# Patient Record
Sex: MALE | Race: WHITE | NOT HISPANIC OR LATINO | Employment: OTHER | URBAN - METROPOLITAN AREA
[De-identification: names, ages, dates, MRNs, and addresses within clinical notes are randomized per-mention and may not be internally consistent; named-entity substitution may affect disease eponyms.]

---

## 2017-02-03 DIAGNOSIS — E11.9 TYPE 2 DIABETES MELLITUS WITHOUT COMPLICATION (H): Primary | ICD-10-CM

## 2017-02-04 NOTE — TELEPHONE ENCOUNTER
Metformin is approved per [facility] refill protocol for 1 total fills.  Patient due for office visit and labs for further refills

## 2017-02-21 DIAGNOSIS — E11.9 TYPE 2 DIABETES MELLITUS WITHOUT COMPLICATION, WITH LONG-TERM CURRENT USE OF INSULIN (H): ICD-10-CM

## 2017-02-21 DIAGNOSIS — Z79.4 TYPE 2 DIABETES MELLITUS WITHOUT COMPLICATION, WITH LONG-TERM CURRENT USE OF INSULIN (H): ICD-10-CM

## 2017-02-21 DIAGNOSIS — E34.9 TESTOSTERONE DEFICIENCY: ICD-10-CM

## 2017-02-21 DIAGNOSIS — E03.9 HYPOTHYROIDISM, UNSPECIFIED TYPE: ICD-10-CM

## 2017-02-21 DIAGNOSIS — D64.9 ANEMIA, UNSPECIFIED TYPE: Primary | ICD-10-CM

## 2017-02-21 LAB — TSH SERPL DL<=0.005 MIU/L-ACNC: 0.73 MU/L (ref 0.4–4)

## 2017-02-21 PROCEDURE — 84443 ASSAY THYROID STIM HORMONE: CPT | Performed by: INTERNAL MEDICINE

## 2017-02-21 PROCEDURE — 36415 COLL VENOUS BLD VENIPUNCTURE: CPT | Performed by: INTERNAL MEDICINE

## 2017-02-22 DIAGNOSIS — E03.9 HYPOTHYROIDISM, UNSPECIFIED TYPE: Primary | ICD-10-CM

## 2017-02-22 RX ORDER — LEVOTHYROXINE SODIUM 125 UG/1
125 TABLET ORAL DAILY
Qty: 90 TABLET | Refills: 1 | Status: SHIPPED | OUTPATIENT
Start: 2017-02-22 | End: 2017-08-28

## 2017-02-22 NOTE — TELEPHONE ENCOUNTER
levothyroxine (SYNTHROID, LEVOTHROID) 125 MCG tablet     Last Written Prescription Date: 2/15/2016  Last Quantity: 90, # refills: 3  Last Office Visit with G, P or [facility] prescribing provider: 8/11/2016   Next 5 appointments (look out 90 days)     Feb 28, 2017  1:30 PM CST   [facility] with [facility]    [address] [address] [phone]                   TSH   Date Value Ref Range Status   02/21/2017 0.73 0.40 - 4.00 mU/L Final

## 2017-02-23 ENCOUNTER — TELEPHONE (OUTPATIENT)
Dept: INTERNAL MEDICINE | Facility: CLINIC | Age: 77
End: 2017-02-23

## 2017-02-23 NOTE — TELEPHONE ENCOUNTER
"Pt sent a [employer] message a couple days ago but has not read it re: need for additional lab work. See message sent below. Please call pt and give message as below    Dayday,   I saw your thyroid lab results today. Unfortunately,  Lab orders for your other medical issues (diabetes, testosterone deficiency, etc) were not placed when the thyroid lab order was entered. I checked with the lab and many of the lab tests you require could not be run off of the blood taken for the thyroid lab today. Therefore I need you to have additional blood drawn.  I apologize for the inconvenience. Please call our appointment desk at [phone] to schedule  another nonfasting  \"lab only\"  appointment again this week so I can review the results with you when I see you back in clinic on 2/28/17     Dr Elizalde  "

## 2017-02-24 DIAGNOSIS — E11.9 TYPE 2 DIABETES MELLITUS WITHOUT COMPLICATION, WITH LONG-TERM CURRENT USE OF INSULIN (H): ICD-10-CM

## 2017-02-24 DIAGNOSIS — Z79.4 TYPE 2 DIABETES MELLITUS WITHOUT COMPLICATION, WITH LONG-TERM CURRENT USE OF INSULIN (H): ICD-10-CM

## 2017-02-24 DIAGNOSIS — D64.9 ANEMIA, UNSPECIFIED TYPE: ICD-10-CM

## 2017-02-24 DIAGNOSIS — E34.9 TESTOSTERONE DEFICIENCY: ICD-10-CM

## 2017-02-24 LAB
ALBUMIN SERPL-MCNC: 4 G/DL (ref 3.4–5)
ALP SERPL-CCNC: 61 U/L (ref 40–150)
ALT SERPL W P-5'-P-CCNC: 17 U/L (ref 0–70)
ANION GAP SERPL CALCULATED.3IONS-SCNC: 5 MMOL/L (ref 3–14)
AST SERPL W P-5'-P-CCNC: 15 U/L (ref 0–45)
BILIRUB SERPL-MCNC: 0.8 MG/DL (ref 0.2–1.3)
BUN SERPL-MCNC: 15 MG/DL (ref 7–30)
CALCIUM SERPL-MCNC: 9.3 MG/DL (ref 8.5–10.1)
CHLORIDE SERPL-SCNC: 105 MMOL/L (ref 94–109)
CO2 SERPL-SCNC: 30 MMOL/L (ref 20–32)
CREAT SERPL-MCNC: 1 MG/DL (ref 0.66–1.25)
ERYTHROCYTE [SEDIMENTATION RATE] IN BLOOD BY WESTERGREN METHOD: 4 MM/H (ref 0–20)
GFR SERPL CREATININE-BSD FRML MDRD: 73 ML/MIN/1.7M2
GLUCOSE SERPL-MCNC: 114 MG/DL (ref 70–99)
HBA1C MFR BLD: 6.8 % (ref 4.3–6)
HGB BLD-MCNC: 11.6 G/DL (ref 13.3–17.7)
IRON SATN MFR SERPL: 6 % (ref 15–46)
IRON SERPL-MCNC: 27 UG/DL (ref 35–180)
POTASSIUM SERPL-SCNC: 3.8 MMOL/L (ref 3.4–5.3)
PROT SERPL-MCNC: 6.9 G/DL (ref 6.8–8.8)
SODIUM SERPL-SCNC: 140 MMOL/L (ref 133–144)
TIBC SERPL-MCNC: 440 UG/DL (ref 240–430)
VIT B12 SERPL-MCNC: 2159 PG/ML (ref 193–986)

## 2017-02-24 PROCEDURE — 83550 IRON BINDING TEST: CPT | Performed by: INTERNAL MEDICINE

## 2017-02-24 PROCEDURE — 83036 HEMOGLOBIN GLYCOSYLATED A1C: CPT | Performed by: INTERNAL MEDICINE

## 2017-02-24 PROCEDURE — 85652 RBC SED RATE AUTOMATED: CPT | Performed by: INTERNAL MEDICINE

## 2017-02-24 PROCEDURE — 84403 ASSAY OF TOTAL TESTOSTERONE: CPT | Performed by: INTERNAL MEDICINE

## 2017-02-24 PROCEDURE — 84270 ASSAY OF SEX HORMONE GLOBUL: CPT | Performed by: INTERNAL MEDICINE

## 2017-02-24 PROCEDURE — 83540 ASSAY OF IRON: CPT | Performed by: INTERNAL MEDICINE

## 2017-02-24 PROCEDURE — 36415 COLL VENOUS BLD VENIPUNCTURE: CPT | Performed by: INTERNAL MEDICINE

## 2017-02-24 PROCEDURE — 80053 COMPREHEN METABOLIC PANEL: CPT | Performed by: INTERNAL MEDICINE

## 2017-02-24 PROCEDURE — 82607 VITAMIN B-12: CPT | Performed by: INTERNAL MEDICINE

## 2017-02-24 PROCEDURE — 85018 HEMOGLOBIN: CPT | Performed by: INTERNAL MEDICINE

## 2017-02-28 ENCOUNTER — OFFICE VISIT (OUTPATIENT)
Dept: INTERNAL MEDICINE | Facility: CLINIC | Age: 77
End: 2017-02-28
Payer: COMMERCIAL

## 2017-02-28 VITALS
HEIGHT: 71 IN | DIASTOLIC BLOOD PRESSURE: 66 MMHG | SYSTOLIC BLOOD PRESSURE: 112 MMHG | BODY MASS INDEX: 28.7 KG/M2 | OXYGEN SATURATION: 95 % | WEIGHT: 205 LBS | TEMPERATURE: 97.5 F | HEART RATE: 55 BPM

## 2017-02-28 DIAGNOSIS — Z86.0100 HISTORY OF COLONIC POLYPS: ICD-10-CM

## 2017-02-28 DIAGNOSIS — D50.9 IRON DEFICIENCY ANEMIA, UNSPECIFIED IRON DEFICIENCY ANEMIA TYPE: ICD-10-CM

## 2017-02-28 DIAGNOSIS — Z23 NEED FOR PROPHYLACTIC VACCINATION AND INOCULATION AGAINST INFLUENZA: ICD-10-CM

## 2017-02-28 DIAGNOSIS — Z12.5 SCREENING FOR PROSTATE CANCER: ICD-10-CM

## 2017-02-28 DIAGNOSIS — E11.9 TYPE 2 DIABETES MELLITUS WITHOUT COMPLICATION, WITH LONG-TERM CURRENT USE OF INSULIN (H): ICD-10-CM

## 2017-02-28 DIAGNOSIS — E34.9 TESTOSTERONE DEFICIENCY: ICD-10-CM

## 2017-02-28 DIAGNOSIS — Z79.4 TYPE 2 DIABETES MELLITUS WITHOUT COMPLICATION, WITH LONG-TERM CURRENT USE OF INSULIN (H): ICD-10-CM

## 2017-02-28 PROCEDURE — 99207 C FOOT EXAM  NO CHARGE: CPT | Performed by: INTERNAL MEDICINE

## 2017-02-28 PROCEDURE — 90662 IIV NO PRSV INCREASED AG IM: CPT | Performed by: INTERNAL MEDICINE

## 2017-02-28 PROCEDURE — G0008 ADMIN INFLUENZA VIRUS VAC: HCPCS | Performed by: INTERNAL MEDICINE

## 2017-02-28 PROCEDURE — 99214 OFFICE O/P EST MOD 30 MIN: CPT | Mod: 25 | Performed by: INTERNAL MEDICINE

## 2017-02-28 RX ORDER — ASCORBIC ACID 500 MG
TABLET ORAL
Qty: 60 TABLET | Refills: 0 | Status: SHIPPED | OUTPATIENT
Start: 2017-02-28 | End: 2017-09-21

## 2017-02-28 RX ORDER — FERROUS GLUCONATE 324(38)MG
TABLET ORAL
Qty: 60 TABLET | Refills: 0 | Status: SHIPPED | OUTPATIENT
Start: 2017-02-28 | End: 2017-09-21

## 2017-02-28 NOTE — PATIENT INSTRUCTIONS
Ferrous gluconate (iron) 1 tab twice a day along with Vit C 500mg tab, 1 tab twice a day taken at the same time to help increase iron absorption  Nonfasting iron, hemoglobin labs in 1 month  Continue current meds  Colonoscopy  with  [facility]. They will call to schedule. If not heard from them in 1 week, then call [phone].   Hold Eliquis for 3 days prior to the colonoscopy and then restart after the procedure.   Hold Aspirin 1 week prior to the colonoscopy and then restart  Will need to have a  bring to/from the colonoscopy  Reduce Lantus to 20 units 2 days and 1 day prior to the colonoscopy.   Hold Metformin the day before and the day of the colonoscopy  Hold scheduled Humalog the day before and the day of the colonoscopy. Resume normal dosing the next day  Day before the colonoscopy when on clear liquids only, then use the following Humalog sliding scale  Check sugars 4 times a day the day before and the day of the colonoscopy    Use the following  Sliding scale for Humalog with meals for the day before and day of the colonoscopy  Sugar 140-180, 1  Additional unit of Humalog  Sugar 181-230, 2  Additional unit of Humalog  Sugar 231-280, 3  Additional unit of Humalog  Sugar 281-330, 5  Additional unit of Humalog  Sugar 331-380, 7  Additional unit of Humalog  Sugar 381-420, 9  Additional unit of Humalog  Sugar > 420, call MD    For bedtime, use the following Humalog sliding scale:  200-250, 1 unit  251-300, 2 units  301-350 3 units  351-400, 4 units

## 2017-02-28 NOTE — PROGRESS NOTES
SUBJECTIVE:                                                    Pascual Perea is a 76 year old male who presents to clinic today for the following health issues:      Diabetes Follow-up      Patient is checking blood sugars: not at all    Diabetic concerns: None     Symptoms of hypoglycemia (low blood sugar): none     Paresthesias (numbness or burning in feet) or sores: Yes      Date of last diabetic eye exam: Oct 2016       Amount of exercise or physical activity: None    Problems taking medications regularly: No    Medication side effects: none  Diet: diabetic    Pt's past medical history, family history, habits, medications and allergies were reviewed with the patient today.  See snap shot for  HCM status. Most recent lab results reviewed with pt. Problem list and histories reviewed & adjusted, as indicated.  Additional history as below:    Lab Results   Component Value Date     02/24/2017     Lab Results   Component Value Date    A1C 6.8 02/24/2017     Lab Results   Component Value Date    CHOL 119 08/15/2016     Lab Results   Component Value Date    LDL 49 08/15/2016     Lab Results   Component Value Date    HDL 35 08/15/2016     Lab Results   Component Value Date    TRIG 175 08/15/2016     Lab Results   Component Value Date    CR 1.00 02/24/2017     Lab Results   Component Value Date    ALT 17 02/24/2017     Lab Results   Component Value Date    AST 15 02/24/2017     Lab Results   Component Value Date    MICROL 24 08/15/2016     Lab Results   Component Value Date    TSH 0.73 02/21/2017       Last testosterone injection done yesterday 2/27 and every 2 weeks. Not checking sugars. Denies CP, SOB, abdominal pain, polyuria, polydipsia, vision changes (except for occ right eye floater) or skin problems. Had eye exam last week and no changes. Some numbness in BLEs distally.  Bloated and full; feeling occ in abdomen. Hx polyp 5  Years ago and due for repeat coloniscopy. Iron levels low and Hgb lower.  "Stools brown. Testosteorone levels pending       Additional ROS:   Constitutional, HEENT, Cardiovascular, Pulmonary, GI and , Neuro, MSK and Psych review of systems/symptoms are otherwise negative or unchanged from previous, except as noted above.      OBJECTIVE:  /66  Pulse 55  Temp 97.5  F (36.4  C) (Oral)  Ht 5' 11\" (1.803 m)  Wt 205 lb (93 kg)  SpO2 95%  BMI 28.59 kg/m2   Estimated body mass index is 28.59 kg/(m^2) as calculated from the following:    Height as of this encounter: 5' 11\" (1.803 m).    Weight as of this encounter: 205 lb (93 kg).  Eye: PERRL, EOMI  HENT: ear canals and TM's normal and nose and mouth without ulcers or lesions   Neck: no adenopathy. Thyroid normal to palpation. No bruits  Pulm: Lungs clear to auscultation   CV: Regular rates and rhythm  GI: Soft, nontender, Normal active bowel sounds, No hepatosplenomegaly or masses palpable  Ext: Peripheral pulses intact. No edema.  Neuro: Normal strength and tone, sensory exam grossly normal  Rectal: Stool brown and guaiac negative    Assessment/Plan: (See plan discussion below for further details)  1. Type 2 diabetes mellitus without complication, with long-term current use of insulin (H)  Diabetic  Control at goal. Continue current meds and repeat labs fasting 6 mos   - FOOT EXAM  NO CHARGE [53582.114]  - EYE EXAM (SIMPLE-NONBILLABLE)  - Hemoglobin A1c; Future  - Comprehensive metabolic panel; Future  - Lipid panel reflex to direct LDL; Future  - Albumin Random Urine Quantitative; Future    2. Iron deficiency anemia, unspecified iron deficiency anemia type  Cause of iron deficiency unclear at this time. Stools guaiac negative. We'll start the patient on iron replacement to see if this is potentially in the absorption issue with use of PPI and also have the patient undergo a colonoscopy  - ferrous gluconate (FERGON) 324 (38 FE) MG tablet; 1 tab twice a day for 1 month  Dispense: 60 tablet; Refill: 0  - ascorbic acid (VITAMIN C) 500 MG " tablet; 1 tab twice a day for 1 month  Dispense: 60 tablet; Refill: 0  - Ferritin; Future  - Hemoglobin; Future  - Iron and iron binding capacity; Future    3. Testosterone deficiency  Repeat labs in 6 months including PSA if  Testosterone level comes back normal. Results pending  - Comprehensive metabolic panel; Future  - Testosterone Free and Total; Future  - CBC with platelets; Future    4. History of colonic polyps  Colonoscopy with [facility]. See plan discussion below regarding medication dosage adjustment around the time of the colonoscopy   - GASTROENTEROLOGY ADULT REF PROCEDURE ONLY    5. Need for prophylactic vaccination and inoculation against influenza  - FLU VACCINE, INCREASED ANTIGEN, PRESV FREE, AGE 65+ [73515]  - Vaccine Administration, Initial [37965]      Plan discussion:  Ferrous gluconate (iron) 1 tab twice a day along with Vit C 500mg tab, 1 tab twice a day taken at the same time to help increase iron absorption  Nonfasting iron, hemoglobin labs in 1 month  Continue current meds  Colonoscopy  with  [facility]. They will call to schedule. If not heard from them in 1 week, then call [phone].   Hold Eliquis for 3 days prior to the colonoscopy and then restart after the procedure.   Hold Aspirin 1 week prior to the colonoscopy and then restart  Will need to have a  bring to/from the colonoscopy  Reduce Lantus to 20 units 2 days and 1 day prior to the colonoscopy.   Hold Metformin the day before and the day of the colonoscopy  Hold scheduled Humalog the day before and the day of the colonoscopy. Resume normal dosing the next day  Day before the colonoscopy when on clear liquids only, then use the following Humalog sliding scale  Check sugars 4 times a day the day before and the day of the colonoscopy  Fasting labs in 6 months    Use the following  Sliding scale for Humalog with meals for the day before and day of the colonoscopy  Sugar 140-180, 1  Additional unit  of Humalog  Sugar 181-230, 2  Additional unit of Humalog  Sugar 231-280, 3  Additional unit of Humalog  Sugar 281-330, 5  Additional unit of Humalog  Sugar 331-380, 7  Additional unit of Humalog  Sugar 381-420, 9  Additional unit of Humalog  Sugar > 420, call MD    For bedtime, use the following Humalog sliding scale:  200-250, 1 unit  251-300, 2 units  301-350 3 units  351-400, 4 units         Andrew Elizalde MD  Internal Medicine Department  [facility]                 Injectable Influenza Immunization Documentation    1.  Is the person to be vaccinated sick today?  No    2. Does the person to be vaccinated have an allergy to eggs or to a component of the vaccine?  No    3. Has the person to be vaccinated today ever had a serious reaction to influenza vaccine in the past?  No    4. Has the person to be vaccinated ever had Guillain-Houston syndrome?  No     Form completed by Savanah Muniz LPN

## 2017-02-28 NOTE — NURSING NOTE
"Chief Complaint   Patient presents with     Diabetes       Initial /66  Pulse 55  Temp 97.5  F (36.4  C) (Oral)  Ht 5' 11\" (1.803 m)  Wt 205 lb (93 kg)  SpO2 95%  BMI 28.59 kg/m2 Estimated body mass index is 28.59 kg/(m^2) as calculated from the following:    Height as of this encounter: 5' 11\" (1.803 m).    Weight as of this encounter: 205 lb (93 kg).  Medication Reconciliation: complete    "

## 2017-02-28 NOTE — MR AVS SNAPSHOT
After Visit Summary   2/28/2017    Pascual Perea    MRN: [id number]           Patient Information     Date Of Birth          1940        Visit Information        Provider Department      2/28/2017 1:30 PM Andrew Elizalde MD [facility]        Today's Diagnoses     Need for prophylactic vaccination and inoculation against influenza    -  1    Type 2 diabetes mellitus without complication, with long-term current use of insulin (H)        Iron deficiency anemia, unspecified iron deficiency anemia type        Testosterone deficiency        History of colonic polyps          Care Instructions    Ferrous gluconate (iron) 1 tab twice a day along with Vit C 500mg tab, 1 tab twice a day taken at the same time to help increase iron absorption  Nonfasting iron, hemoglobin labs in 1 month  Continue current meds  Colonoscopy  with  [facility]. They will call to schedule. If not heard from them in 1 week, then call [phone].   Hold Eliquis for 3 days prior to the colonoscopy and then restart after the procedure. Will need to have a  bring to/drom the colonoscopy  Reduce Lantus to 20 units 2 days and 1 day prior to the colonoscopy.   Hold Metformin the day before and the day of the colonoscopy  Hold scheduled Humalog the day before and the day of the colonoscopy. Resume normal dosing the next day  Day before the colonoscopy when on clear liquids only, then use the following Humalog sliding scale    Use the following  Sliding scale for Humalog with meals:  Sugar 140-180, 1  Additional unit of Humalog  Sugar 181-230, 2  Additional unit of Humalog  Sugar 231-280, 3  Additional unit of Humalog  Sugar 281-330, 5  Additional unit of Humalog  Sugar 331-380, 7  Additional unit of Humalog  Sugar 381-420, 9  Additional unit of Humalog  Sugar > 420, call MD    For bedtime, use the following Humalog sliding scale:  200-250, 1 unit  251-300, 2 units  301-350 3 units  351-400, 4  units                   Follow-ups after your visit        Additional Services     GASTROENTEROLOGY ADULT REF PROCEDURE ONLY       Last Lab Result: Creatinine (mg/dL)       Date                     Value                 02/24/2017               1.00             ----------  Body mass index is 28.59 kg/(m^2).     Needed:  No  Language:  English    Patient will be contacted to schedule procedure.     Please be aware that coverage of these services is subject to the terms and limitations of your health insurance plan.  Call member services at your health plan with any benefit or coverage questions.  Any procedures must be performed at a [place] facility OR coordinated by your clinic's referral office.    Please bring the following with you to your appointment:    (1) Any X-Rays, CTs or MRIs which have been performed.  Contact the facility where they were done to arrange for  prior to your scheduled appointment.    (2) List of current medications   (3) This referral request   (4) Any documents/labs given to you for this referral                  Future tests that were ordered for you today     Open Future Orders        Priority Expected Expires Ordered    Ferritin Routine 3/28/2017 2/28/2018 2/28/2017    Hemoglobin Routine 3/28/2017 2/28/2018 2/28/2017    Iron and iron binding capacity Routine 3/28/2017 2/28/2018 2/28/2017    Hemoglobin A1c Routine 8/27/2017 12/25/2017 2/28/2017    Comprehensive metabolic panel Routine 8/27/2017 12/25/2017 2/28/2017    Lipid panel reflex to direct LDL Routine 8/27/2017 12/25/2017 2/28/2017    Albumin Random Urine Quantitative Routine 8/27/2017 1/24/2018 2/28/2017    Testosterone Free and Total Routine 8/27/2017 12/25/2017 2/28/2017    CBC with platelets Routine 8/27/2017 12/25/2017 2/28/2017            Who to contact     If you have questions or need follow up information about today's clinic visit or your schedule please contact [facility]  "directly at [phone].  Normal or non-critical lab and imaging results will be communicated to you by RidePalhart, letter or phone within 4 business days after the clinic has received the results. If you do not hear from us within 7 days, please contact the clinic through RidePalhart or phone. If you have a critical or abnormal lab result, we will notify you by phone as soon as possible.  Submit refill requests through [email or web address] or call your pharmacy and they will forward the refill request to us. Please allow 3 business days for your refill to be completed.          Additional Information About Your Visit        RidePalhart Information     [email or web address] gives you secure access to your electronic health record. If you see a primary care provider, you can also send messages to your care team and make appointments. If you have questions, please call your primary care clinic.  If you do not have a primary care provider, please call [phone] and they will assist you.        Care EveryWhere ID     This is your Care EveryWhere ID. This could be used by other organizations to access your [facility] medical records  [id number]        Your Vitals Were     Pulse Temperature Height Pulse Oximetry BMI (Body Mass Index)       55 97.5  F (36.4  C) (Oral) 5' 11\" (1.803 m) 95% 28.59 kg/m2        Blood Pressure from Last 3 Encounters:   02/28/17 112/66   10/07/16 128/68   08/31/16 132/60    Weight from Last 3 Encounters:   02/28/17 205 lb (93 kg)   10/07/16 205 lb (93 kg)   08/31/16 207 lb 4.8 oz (94 kg)              We Performed the Following     EYE EXAM (SIMPLE-NONBILLABLE)     FLU VACCINE, INCREASED ANTIGEN, PRESV FREE, AGE 65+ [73524]     FOOT EXAM  NO CHARGE [29051.114]     GASTROENTEROLOGY ADULT REF PROCEDURE ONLY     Vaccine Administration, Initial [80620]          Today's Medication Changes          These changes are accurate as of: 2/28/17  2:34 PM.  If you have any questions, ask your nurse or doctor.               Start taking these " medicines.        Dose/Directions    ascorbic acid 500 MG tablet   Commonly known as:  VITAMIN C   Used for:  Iron deficiency anemia, unspecified iron deficiency anemia type   Started by:  Andrew Elizalde MD        1 tab twice a day for 1 month   Quantity:  60 tablet   Refills:  0       ferrous gluconate 324 (38 FE) MG tablet   Commonly known as:  FERGON   Used for:  Iron deficiency anemia, unspecified iron deficiency anemia type   Started by:  Andrew Elizalde MD        1 tab twice a day for 1 month   Quantity:  60 tablet   Refills:  0            Where to get your medicines      Some of these will need a paper prescription and others can be bought over the counter.  Ask your nurse if you have questions.     Bring a paper prescription for each of these medications     ascorbic acid 500 MG tablet    ferrous gluconate 324 (38 FE) MG tablet                Primary Care Provider Office Phone # Fax #    Andrew Elizalde [phone]       [facility] [address] [facility] [address]        Thank you!     Thank you for choosing [facility]  for your care. Our goal is always to provide you with excellent care. Hearing back from our patients is one way we can continue to improve our services. Please take a few minutes to complete the written survey that you may receive in the mail after your visit with us. Thank you!             Your Updated Medication List - Protect others around you: Learn how to safely use, store and throw away your medicines at www.disposemymeds.org.          This list is accurate as of: 2/28/17  2:34 PM.  Always use your most recent med list.                   Brand Name Dispense Instructions for use    apixaban ANTICOAGULANT 5 MG tablet    ELIQUIS    60 tablet    Take 1 tablet (5 mg) by mouth 2 times daily       ascorbic acid 500 MG tablet    VITAMIN C    60 tablet    1 tab twice a day for 1 month       aspirin 81 MG tablet      Take by mouth At Bedtime     "   blood glucose monitoring test strip    ONE TOUCH ULTRA    200 each    Test twice daily with One Touch Ultra Blue       cyanocobalamin 1000 MCG tablet    vitamin  B-12     Take 5,000 mcg by mouth daily.       ferrous gluconate 324 (38 FE) MG tablet    FERGON    60 tablet    1 tab twice a day for 1 month       fluticasone 50 MCG/ACT spray    FLONASE    48 g    Spray 1-2 sprays into both nostrils daily       gemfibrozil 600 MG tablet    LOPID    180 tablet    Take 1 tablet (600 mg) by mouth 2 times daily       glucose 4 G Chew chewable tablet     30    as directed       insulin glargine 100 UNIT/ML injection    LANTUS SOLOSTAR    3 mL    Inject 35-40 units at bedtime       insulin lispro 100 UNIT/ML injection    HumaLOG    3 Month    Inject  Subcutaneous 3 times daily (before meals). 2 units per carb choice (approx 5-10 units/meal)       insulin pen needle 30G X 8 MM    NOVOFINE 30    400 each    1 Syringe 4 times daily       levothyroxine 125 MCG tablet    SYNTHROID/LEVOTHROID    90 tablet    Take 1 tablet (125 mcg) by mouth daily       lisinopril 20 MG tablet    PRINIVIL/ZESTRIL    90 tablet    Take 1 tablet (20 mg) by mouth daily       loratadine 10 MG tablet    CLARITIN    90 tablet    Take 1 tablet (10 mg) by mouth daily       metFORMIN 1000 MG tablet    GLUCOPHAGE    60 tablet    Take 1 tablet (1,000 mg) by mouth 2 times daily (with meals)       metoprolol 50 MG tablet    LOPRESSOR    180 tablet    Take 1 tablet (50 mg) by mouth 2 times daily       nitroglycerin 0.4 MG sublingual tablet    NITROSTAT    25 tablet    Place 1 tablet (0.4 mg) under the tongue every 5 minutes as needed for chest pain       [facility] LANCETS 33G Misc     300 each    1 Device 3 times daily . Use to test blood sugars 3 times daily or as directed.       * order for DME      CPAP off and on at night       * order for DME     30 each    3ml syringes and 22G 1 1/2\" needles to use with testosterone inj every 2 weeks       pravastatin " 80 MG tablet    PRAVACHOL    90 tablet    Take 1 tablet (80 mg) by mouth At Bedtime       ranitidine 150 MG tablet    ZANTAC    180 tablet    Take 1 tablet (150 mg) by mouth 2 times daily       testosterone cypionate 200 MG/ML injection    DEPOTESTOTERONE CYPIONATE    10 mL    300mg (1.5ml)  Injected IM every 2 weeks       VITAMIN D3 PO      Take 5,000 Units by mouth daily       * Notice:  This list has 2 medication(s) that are the same as other medications prescribed for you. Read the directions carefully, and ask your doctor or other care provider to review them with you.

## 2017-03-01 LAB
SHBG SERPL-SCNC: 8 NMOL/L (ref 11–80)
TESTOST FREE SERPL-MCNC: 18.35 NG/DL (ref 4.7–24.4)
TESTOST SERPL-MCNC: 520 NG/DL (ref 240–950)

## 2017-03-09 DIAGNOSIS — E11.9 TYPE 2 DIABETES MELLITUS WITHOUT COMPLICATION (H): ICD-10-CM

## 2017-03-09 NOTE — TELEPHONE ENCOUNTER
Prescription approved per [facility] Refill Protocol.    Mali Coley, PharmD  [facility]  On behalf of [facility]

## 2017-03-09 NOTE — TELEPHONE ENCOUNTER
Metformin 1000mg Tabs         Last Written Prescription Date: 2/4/2017  Last Fill Quantity: 60, # refills: 0  [place] [facility] [place] with [facility] or [facility] prescribing provider:  2/28/2017        BP Readings from Last 3 Encounters:   02/28/17 112/66   10/07/16 128/68   08/31/16 132/60     Lab Results   Component Value Date    MICROL 24 08/15/2016     No results found for: MICROALBUMIN  Creatinine   Date Value Ref Range Status   02/24/2017 1.00 0.66 - 1.25 mg/dL Final   ]  GFR Estimate   Date Value Ref Range Status   02/24/2017 73 >60 mL/min/1.7m2 Final     Comment:     Non  GFR Calc   08/15/2016 64 >60 mL/min/1.7m2 Final     Comment:     Non  GFR Calc   04/17/2016 60 (L) >60 mL/min/1.7m2 Final     Comment:     Non  GFR Calc     GFR Estimate If Black   Date Value Ref Range Status   02/24/2017 88 >60 mL/min/1.7m2 Final     Comment:      GFR Calc   08/15/2016 77 >60 mL/min/1.7m2 Final     Comment:      GFR Calc   04/17/2016 73 >60 mL/min/1.7m2 Final     Comment:      GFR Calc     Lab Results   Component Value Date    CHOL 119 08/15/2016     Lab Results   Component Value Date    HDL 35 08/15/2016     Lab Results   Component Value Date    LDL 49 08/15/2016     Lab Results   Component Value Date    TRIG 175 08/15/2016     Lab Results   Component Value Date    CHOLHDLRATIO 3.3 05/08/2015     Lab Results   Component Value Date    AST 15 02/24/2017     Lab Results   Component Value Date    ALT 17 02/24/2017     Lab Results   Component Value Date    A1C 6.8 02/24/2017    A1C 7.0 08/15/2016    A1C 7.2 01/18/2016    A1C 6.8 05/08/2015    A1C 6.9 11/07/2014     Potassium   Date Value Ref Range Status   02/24/2017 3.8 3.4 - 5.3 mmol/L Final       Geeta Jean Baptitse, Pharmacy Technician  [facility]  [phone]

## 2017-03-22 ENCOUNTER — TRANSFERRED RECORDS (OUTPATIENT)
Dept: HEALTH INFORMATION MANAGEMENT | Facility: CLINIC | Age: 77
End: 2017-03-22

## 2017-03-26 DIAGNOSIS — D50.9 IRON DEFICIENCY ANEMIA, UNSPECIFIED IRON DEFICIENCY ANEMIA TYPE: ICD-10-CM

## 2017-03-27 RX ORDER — FERROUS GLUCONATE 324(38)MG
TABLET ORAL
Qty: 60 TABLET | Refills: 0 | OUTPATIENT
Start: 2017-03-27

## 2017-03-27 NOTE — TELEPHONE ENCOUNTER
ferrous gluconate (FERGON) 324 (38 FE) MG tablet      Last Written Prescription Date:  2/28/2017  Last Fill Quantity: 60,   # refills: 0  Last Office Visit with [facility], [facility] or [facility] prescribing provider: 2/28/2017  Future Office visit:       Routing refill request to provider for review/approval because:  Drug not on the [facility], [facility] or [facility] refill protocol or controlled substance

## 2017-03-27 NOTE — TELEPHONE ENCOUNTER
Pt was to have nonfasting labs done after 1 month  Of iron (ferrous gluc) and Vit C. No appt made. Pt to have NF labs in the next couple days as ordered. Based on reusults, will then determine if appropriate to refill Rx for ferrous Gluconate or if OK to stop or if need IV iron instead. RF declined at this time while I await lab results. Call pt to schedule the lab appt and inform pharmacy RF deferred at this time for ferrous gluconate

## 2017-03-28 DIAGNOSIS — E34.9 TESTOSTERONE DEFICIENCY: ICD-10-CM

## 2017-03-28 DIAGNOSIS — I48.3 TYPICAL ATRIAL FLUTTER (H): ICD-10-CM

## 2017-03-28 RX ORDER — TESTOSTERONE CYPIONATE 200 MG/ML
INJECTION, SOLUTION INTRAMUSCULAR
Qty: 10 ML | Refills: 1 | Status: SHIPPED | OUTPATIENT
Start: 2017-03-28 | End: 2017-04-18

## 2017-03-28 NOTE — TELEPHONE ENCOUNTER
Testosterone 200      Last Written Prescription Date:  09/27/2016  Last Fill Quantity: 10 ml,   # refills: 1  Last Office Visit with [facility], [facility] or [facility] prescribing provider: 02/28/2017  Future Office visit:       Routing refill request to provider for review/approval because:  Drug not on the [facility], [facility] or [facility] refill protocol or controlled substance                Thank You,  Kade Borrego, [facility]  On behalf of [facility]

## 2017-03-28 NOTE — TELEPHONE ENCOUNTER
Prescription approved per [facility] Refill Protocol.    Mali Coley, PharmD  [facility]  On behalf of [facility]

## 2017-03-28 NOTE — TELEPHONE ENCOUNTER
Eliquis 5 mg           Last Written Prescription Date: 08/08/2016  Last Fill Quantity: 60, # refills: 5    Last Office Visit with [facility], P or [facility] prescribing provider:  02/28/2017   Future Office Visit:       Lab Results   Component Value Date    WBC 4.4 08/15/2016     Lab Results   Component Value Date    RBC 5.14 08/15/2016     Lab Results   Component Value Date    HGB 11.6 02/24/2017     Lab Results   Component Value Date    HCT 40.8 08/15/2016     No components found for: MCT  Lab Results   Component Value Date    MCV 79 08/15/2016     Lab Results   Component Value Date    MCH 25.1 08/15/2016     Lab Results   Component Value Date    MCHC 31.6 08/15/2016     Lab Results   Component Value Date    RDW 17.3 08/15/2016     Lab Results   Component Value Date     08/15/2016     Lab Results   Component Value Date    AST 15 02/24/2017     Lab Results   Component Value Date    ALT 17 02/24/2017     Creatinine   Date Value Ref Range Status   02/24/2017 1.00 0.66 - 1.25 mg/dL Final   ]                            Thank You,  Kade Borrego, [facility]  On behalf of [facility]

## 2017-03-30 ENCOUNTER — TELEPHONE (OUTPATIENT)
Dept: INTERNAL MEDICINE | Facility: CLINIC | Age: 77
End: 2017-03-30

## 2017-03-30 DIAGNOSIS — M89.9 DISORDER OF BONE: ICD-10-CM

## 2017-03-30 DIAGNOSIS — E34.9 TESTOSTERONE DEFICIENCY: ICD-10-CM

## 2017-03-30 DIAGNOSIS — M85.80 OSTEOPENIA: Primary | ICD-10-CM

## 2017-03-30 NOTE — TELEPHONE ENCOUNTER
Prior authorization    Medication name depo test  Insurance medica  Insurance ID number [id number]  Prior authorization faxed through cover my meds

## 2017-03-30 NOTE — TELEPHONE ENCOUNTER
PRIOR AUTHORIZATION REQUIRED      Prior Authorization Required on: Depo testosterone 200  Patient Insurance: Medica Part D  Patient ID: [id number]  Insurance Phone Number: [phone]    Please let us know when the PA has been approved or denied.    Thank you!      [facility]  [email or web address]  [phone]

## 2017-03-30 NOTE — TELEPHONE ENCOUNTER
Unclear why needing PA now as was initiated last June (see  Tele message 6/6/17) and then approved  In July for 1 year. Will route to PA nurse to send to insurance again. 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 Please send in PA and inform pt/pharmacy when approved

## 2017-04-10 DIAGNOSIS — D50.9 IRON DEFICIENCY ANEMIA, UNSPECIFIED IRON DEFICIENCY ANEMIA TYPE: ICD-10-CM

## 2017-04-10 LAB
FERRITIN SERPL-MCNC: 11 NG/ML (ref 26–388)
HGB BLD-MCNC: 12.4 G/DL (ref 13.3–17.7)
IRON SATN MFR SERPL: 7 % (ref 15–46)
IRON SERPL-MCNC: 27 UG/DL (ref 35–180)
TIBC SERPL-MCNC: 400 UG/DL (ref 240–430)

## 2017-04-10 PROCEDURE — 82728 ASSAY OF FERRITIN: CPT | Performed by: INTERNAL MEDICINE

## 2017-04-10 PROCEDURE — 83540 ASSAY OF IRON: CPT | Performed by: INTERNAL MEDICINE

## 2017-04-10 PROCEDURE — 36415 COLL VENOUS BLD VENIPUNCTURE: CPT | Performed by: INTERNAL MEDICINE

## 2017-04-10 PROCEDURE — 83550 IRON BINDING TEST: CPT | Performed by: INTERNAL MEDICINE

## 2017-04-10 PROCEDURE — 85018 HEMOGLOBIN: CPT | Performed by: INTERNAL MEDICINE

## 2017-04-12 NOTE — TELEPHONE ENCOUNTER
PA denied.  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

## 2017-04-19 NOTE — TELEPHONE ENCOUNTER
Per pt's insurance now, testosterone treatment will not be covered unless related to one of 5 listed causes below. Testicular atrophy (which is the cause of pt's low testosterone) is not one of the 5 covered reasons that insurance will pay for testosterone. Therefore unless pt  wants to pay out of pocket for testosterone (which is quite expensive), will have to stop testosterone injection treatment unless has change of insurance providers in the future. If develops future hot flash issues in future off of med that are bothersome, then see me back in clinic. Has hx osteopenia (moderate bone density loss). Since having to stop testosterone, would also repeat a bone density test in 6 mos at [facility] as ordered to see if bone thinning more and need other non-hormone treatment like Fosamax or similar treatment in future for bone density (would depend on bone density status with future DEXA results)

## 2017-05-24 ENCOUNTER — MYC MEDICAL ADVICE (OUTPATIENT)
Dept: INTERNAL MEDICINE | Facility: CLINIC | Age: 77
End: 2017-05-24

## 2017-05-24 DIAGNOSIS — E11.9 TYPE 2 DIABETES MELLITUS WITHOUT COMPLICATION (H): ICD-10-CM

## 2017-06-03 DIAGNOSIS — E78.5 HYPERLIPIDEMIA LDL GOAL <70: ICD-10-CM

## 2017-06-05 NOTE — TELEPHONE ENCOUNTER
Routing refill request to provider for review/approval because:  Labs out of range:  TRIG, HDL

## 2017-06-05 NOTE — TELEPHONE ENCOUNTER
gemfibrozil (LOPID) 600 MG tablet       Last Written Prescription Date: 6/27/2016  Last Fill Quantity: 180, # refills: 3    Last Office Visit with G, P or [facility] prescribing provider:  2/28/2017   Future Office Visit:      Cholesterol   Date Value Ref Range Status   08/15/2016 119 <200 mg/dL Final     HDL Cholesterol   Date Value Ref Range Status   08/15/2016 35 (L) >39 mg/dL Final     LDL Cholesterol Calculated   Date Value Ref Range Status   08/15/2016 49 <100 mg/dL Final     Comment:     Desirable:       <100 mg/dl     Triglycerides   Date Value Ref Range Status   08/15/2016 175 (H) <150 mg/dL Final     Comment:     Borderline high:  150-199 mg/dl   High:             200-499 mg/dl   Very high:       >499 mg/dl   Fasting specimen       Cholesterol/HDL Ratio   Date Value Ref Range Status   05/08/2015 3.3 0.0 - 5.0 Final     ALT   Date Value Ref Range Status   02/24/2017 17 0 - 70 U/L Final

## 2017-06-06 RX ORDER — GEMFIBROZIL 600 MG/1
TABLET, FILM COATED ORAL
Qty: 180 TABLET | Refills: 3 | Status: SHIPPED | OUTPATIENT
Start: 2017-06-06 | End: 2018-03-05

## 2017-06-26 DIAGNOSIS — I10 ESSENTIAL HYPERTENSION, BENIGN: ICD-10-CM

## 2017-06-27 RX ORDER — METOPROLOL TARTRATE 50 MG
TABLET ORAL
Qty: 180 TABLET | Refills: 2 | Status: SHIPPED | OUTPATIENT
Start: 2017-06-27 | End: 2018-03-26

## 2017-06-27 NOTE — TELEPHONE ENCOUNTER
metoprolol (LOPRESSOR) 50 MG tablet      Last Written Prescription Date: 6/20/2016  Last Fill Quantity: 180, # refills: 3    Last Office Visit with [facility], [facility] or [facility] prescribing provider:  2/28/2017   Future Office Visit:    Next 5 appointments (look out 90 days)     Aug 30, 2017 10:15 AM CDT   Return Visit with Speedy Olivas MD   [facility] [place] ([facility] Clinics)    [address] [place] [phone]                    BP Readings from Last 3 Encounters:   02/28/17 112/66   10/07/16 128/68   08/31/16 132/60

## 2017-07-10 DIAGNOSIS — J30.9 ALLERGIC RHINITIS: ICD-10-CM

## 2017-07-11 RX ORDER — LORATADINE 10 MG/1
TABLET ORAL
Qty: 90 TABLET | Refills: 2 | Status: SHIPPED | OUTPATIENT
Start: 2017-07-11 | End: 2018-04-09

## 2017-07-11 NOTE — TELEPHONE ENCOUNTER
loratadine (CLARITIN) 10 MG tablet      Last Written Prescription Date: 6/20/2016  Last Fill Quantity: 90,  # refills: 3   Last Office Visit with [facility], [facility] or [facility] prescribing provider: 2/28/2017                                         Next 5 appointments (look out 90 days)     Aug 30, 2017 10:15 AM CDT   Return Visit with Speedy Olivas MD   [facility] PHYSICIANS [facility] ([facility] Clin[facility])    [address] [place] [phone]

## 2017-07-31 DIAGNOSIS — K21.9 GASTROESOPHAGEAL REFLUX DISEASE WITHOUT ESOPHAGITIS: ICD-10-CM

## 2017-08-01 NOTE — TELEPHONE ENCOUNTER
Prescription approved per [facility] Refill Protocol.      Zantac       Last Written Prescription Date: 4/27/16  Last Fill Quantity: 180,  # refills: 3   Last Office Visit with [facility] or [facility] prescribing provider: 2/28/17                                         Next 5 appointments (look out 90 days)     Aug 30, 2017 10:15 AM CDT   Return Visit with Speedy Olivas MD   [facility] PHYSICIANS [facility] ([facility] Clin[facility])    [address] [place] [phone]

## 2017-08-22 DIAGNOSIS — E78.5 HYPERLIPIDEMIA LDL GOAL <70: Primary | ICD-10-CM

## 2017-08-28 DIAGNOSIS — E03.9 HYPOTHYROIDISM, UNSPECIFIED TYPE: ICD-10-CM

## 2017-08-30 ENCOUNTER — OFFICE VISIT (OUTPATIENT)
Dept: CARDIOLOGY | Facility: CLINIC | Age: 77
End: 2017-08-30
Payer: COMMERCIAL

## 2017-08-30 VITALS
WEIGHT: 202.4 LBS | HEIGHT: 71 IN | DIASTOLIC BLOOD PRESSURE: 58 MMHG | SYSTOLIC BLOOD PRESSURE: 116 MMHG | HEART RATE: 60 BPM | BODY MASS INDEX: 28.34 KG/M2

## 2017-08-30 DIAGNOSIS — E78.5 HYPERLIPIDEMIA LDL GOAL <70: ICD-10-CM

## 2017-08-30 DIAGNOSIS — E11.9 TYPE 2 DIABETES MELLITUS WITHOUT COMPLICATION, WITH LONG-TERM CURRENT USE OF INSULIN (H): ICD-10-CM

## 2017-08-30 DIAGNOSIS — I10 ESSENTIAL HYPERTENSION, BENIGN: ICD-10-CM

## 2017-08-30 DIAGNOSIS — G47.33 OSA (OBSTRUCTIVE SLEEP APNEA): ICD-10-CM

## 2017-08-30 DIAGNOSIS — Z79.4 TYPE 2 DIABETES MELLITUS WITHOUT COMPLICATION, WITH LONG-TERM CURRENT USE OF INSULIN (H): ICD-10-CM

## 2017-08-30 DIAGNOSIS — I25.10 CORONARY ARTERY DISEASE INVOLVING NATIVE CORONARY ARTERY OF NATIVE HEART WITHOUT ANGINA PECTORIS: ICD-10-CM

## 2017-08-30 DIAGNOSIS — I25.10 ATHEROSCLEROSIS OF NATIVE CORONARY ARTERY OF NATIVE HEART WITHOUT ANGINA PECTORIS: ICD-10-CM

## 2017-08-30 DIAGNOSIS — E78.5 HYPERLIPIDEMIA LDL GOAL <70: Primary | ICD-10-CM

## 2017-08-30 DIAGNOSIS — I27.20 PULMONARY HYPERTENSION (H): ICD-10-CM

## 2017-08-30 LAB
ALT SERPL W P-5'-P-CCNC: <5 U/L (ref 5–30)
CHOLEST SERPL-MCNC: 133 MG/DL
HDLC SERPL-MCNC: 32 MG/DL
LDLC SERPL CALC-MCNC: 66 MG/DL
NONHDLC SERPL-MCNC: 101 MG/DL
TRIGL SERPL-MCNC: 177 MG/DL

## 2017-08-30 PROCEDURE — 84460 ALANINE AMINO (ALT) (SGPT): CPT | Performed by: INTERNAL MEDICINE

## 2017-08-30 PROCEDURE — 36415 COLL VENOUS BLD VENIPUNCTURE: CPT | Performed by: INTERNAL MEDICINE

## 2017-08-30 PROCEDURE — 80061 LIPID PANEL: CPT | Performed by: INTERNAL MEDICINE

## 2017-08-30 PROCEDURE — 99214 OFFICE O/P EST MOD 30 MIN: CPT | Performed by: INTERNAL MEDICINE

## 2017-08-30 RX ORDER — LEVOTHYROXINE SODIUM 125 UG/1
TABLET ORAL
Qty: 90 TABLET | Refills: 1 | Status: SHIPPED | OUTPATIENT
Start: 2017-08-30 | End: 2018-03-05

## 2017-08-30 NOTE — TELEPHONE ENCOUNTER
Levothyroxine     Last Written Prescription Date: 2/22/17  Last Quantity: 90, # refills: 1  Last Office Visit with [facility], [facility] or [facility] prescribing provider: 2/28/17   Next 5 appointments (look out 90 days)     Aug 30, 2017 10:15 AM CDT   Return Visit with Speedy Olivas MD   [facility] PHYSICIANS [facility] ([facility])    [address] [place] [address] [phone]                   TSH   Date Value Ref Range Status   02/21/2017 0.73 0.40 - 4.00 mU/L Final

## 2017-08-30 NOTE — LETTER
8/30/2017    Andrew Elizalde MD  [address] [place] [address]    RE: Pascual Perea       Dear Colleague,    I had the pleasure of seeing Pascual Esteswrocki in the [facility].    I got together with Dayday Perea and his wife today.  Dayday is 77.  He has had multiple complications associated with classic metabolic syndrome including chronic midsection obesity, insulin-dependent diabetes mellitus, hypercholesterolemia and hypertriglyceridemia, hypertension, and known coronary artery disease.  He had coronary artery disease and a previous LIMA to the LAD.  He had non-critical, non-limiting coronary disease otherwise.      He has had paroxysmal atrial fibrillation and atrial flutter in the past, but on his current medication program he has been blessed with clinically sinus rhythm.  Today, he is in a normal regular rhythm at 60-70 beats a minute.      CURRENT MEDICATIONS:  As listed in Epic include:    1.  Levothyroxine 125 mcg a day.   2.  Zantac 150 mg a day.   3.  Metoprolol tartrate 50 mg b.i.d.   4.  Lopid 600 mg b.i.d.   5.  Insulin per schedule.   6.  Eliquis 5 mg b.i.d. for his history of atrial flutter and atrial fibrillation.   7.  Metformin 1000 mg b.i.d.   8.  Lisinopril 20 mg a day.   9.  Pravachol 80 mg at bedtime.   10.  Oral iron daily.   11.  Vitamin C.   12.  Calcium and vitamin D and other p.r.n. medicines as listed.      REVIEW OF SYSTEMS:  Identified in Epic and I endorse the findings and they include:    HEENT:  Positive for glasses.     RESPIRATORY:  Positive for sleep apnea, but he has apparently lost weight and has less severe sleep apnea.   CARDIAC:  Positive for diminished endurance and more easy fatigue, but no classic angina.   GASTROINTESTINAL:  Heartburn clinically in the past, but not recently.     ENDOCRINE:  Positive for diabetes and hypothyroidism.      The remaining review was negative other than a little numbness of the feet.      PHYSICAL  EXAMINATION:   GENERAL:  Well-developed, well-nourished, overweight, rotund male weighing 202 pounds, which is stable.   VITAL SIGNS:  Blood pressure 116/60, heart rate 60 beats a minute and regular.   HEAD:  Normal.   NECK:  He had no neck vein distention or bruits.   HEART:  Regular without gallop or murmur.   LUNGS:  Clear.  Sternum is well-healed.   ABDOMEN:  Soft, rotund, nontender.  No guarding or mass noted.   EXTREMITIES:  Free of edema.  Posterior tibial pulses +1.      This very nice man has done a great job of controlling his metabolic issues and chemistry problems.  He is normotensive.  His lipid profile as of this morning showed an LDL of 66, total cholesterol 133, HDL 32, triglycerides 170.  Renal function has been normal, in February creatinine 1.0.      He is relatively asymptomatic.  He looks well and feels well.  He shows no signs of heart failure or angina and his diabetes has been under pretty excellent control with hemoglobin A1c's in the 6.8 range.      IMPRESSION:   1.  Chronic asymptomatic coronary artery disease.   2.  Treated hypertension.   3.  Hyperlipidemia.   4.  Chronic insulin-dependent diabetes with very mild peripheral neuropathy.   5.  History of atrial fibrillation in the past, currently tolerating Eliquis 5 mg b.i.d.   6.  History of indigestion, on Zantac.   7.  Treated hypothyroidism.   8.  Insulin-dependent diabetes mellitus.      PLAN:  He says that you take care of his medicines reliably.  I endorsed continuing the current program.  I did not order any medicines or any tests at this time.  I asked to see him in a year.  If he has problems, let me know.      Warm regards.  Thanks for the privilege.  If you have questions or concerns, give me a call.      Over 35 minutes was spent doing this consult, reviewing multiple old records, imaging, echo, stress nuclear, laboratory work, lipids, renal function, hemoglobin A1c, etc.  A review of systems and a complete review of his  medicines, indications and side effects was performed.  All questions were answered and he left feeling well.       Again, thank you for allowing me to participate in the care of your patient.      Sincerely,    HILLARY BREAUX MD     [facility]

## 2017-08-30 NOTE — PROGRESS NOTES
HISTORY OF PRESENT ILLNESS:  I got together with Dayday Perea and his wife today.  Dayday is 77.  He has had multiple complications associated with classic metabolic syndrome including chronic midsection obesity, insulin-dependent diabetes mellitus, hypercholesterolemia and hypertriglyceridemia, hypertension, and known coronary artery disease.  He had coronary artery disease and a previous LIMA to the LAD.  He had non-critical, non-limiting coronary disease otherwise.      He has had paroxysmal atrial fibrillation and atrial flutter in the past, but on his current medication program he has been blessed with clinically sinus rhythm.  Today, he is in a normal regular rhythm at 60-70 beats a minute.      CURRENT MEDICATIONS:  As listed in Epic include:    1.  Levothyroxine 125 mcg a day.   2.  Zantac 150 mg a day.   3.  Metoprolol tartrate 50 mg b.i.d.   4.  Lopid 600 mg b.i.d.   5.  Insulin per schedule.   6.  Eliquis 5 mg b.i.d. for his history of atrial flutter and atrial fibrillation.   7.  Metformin 1000 mg b.i.d.   8.  Lisinopril 20 mg a day.   9.  Pravachol 80 mg at bedtime.   10.  Oral iron daily.   11.  Vitamin C.   12.  Calcium and vitamin D and other p.r.n. medicines as listed.      REVIEW OF SYSTEMS:  Identified in Epic and I endorse the findings and they include:    HEENT:  Positive for glasses.     RESPIRATORY:  Positive for sleep apnea, but he has apparently lost weight and has less severe sleep apnea.   CARDIAC:  Positive for diminished endurance and more easy fatigue, but no classic angina.   GASTROINTESTINAL:  Heartburn clinically in the past, but not recently.     ENDOCRINE:  Positive for diabetes and hypothyroidism.      The remaining review was negative other than a little numbness of the feet.      PHYSICAL EXAMINATION:   GENERAL:  Well-developed, well-nourished, overweight, rotund male weighing 202 pounds, which is stable.   VITAL SIGNS:  Blood pressure 116/60, heart rate 60 beats a minute and  regular.   HEAD:  Normal.   NECK:  He had no neck vein distention or bruits.   HEART:  Regular without gallop or murmur.   LUNGS:  Clear.  Sternum is well-healed.   ABDOMEN:  Soft, rotund, nontender.  No guarding or mass noted.   EXTREMITIES:  Free of edema.  Posterior tibial pulses +1.      This very nice man has done a great job of controlling his metabolic issues and chemistry problems.  He is normotensive.  His lipid profile as of this morning showed an LDL of 66, total cholesterol 133, HDL 32, triglycerides 170.  Renal function has been normal, in February creatinine 1.0.      He is relatively asymptomatic.  He looks well and feels well.  He shows no signs of heart failure or angina and his diabetes has been under pretty excellent control with hemoglobin A1c's in the 6.8 range.      IMPRESSION:   1.  Chronic asymptomatic coronary artery disease.   2.  Treated hypertension.   3.  Hyperlipidemia.   4.  Chronic insulin-dependent diabetes with very mild peripheral neuropathy.   5.  History of atrial fibrillation in the past, currently tolerating Eliquis 5 mg b.i.d.   6.  History of indigestion, on Zantac.   7.  Treated hypothyroidism.   8.  Insulin-dependent diabetes mellitus.      PLAN:  He says that you take care of his medicines reliably.  I endorsed continuing the current program.  I did not order any medicines or any tests at this time.  I asked to see him in a year.  If he has problems, let me know.      Warm regards.  Thanks for the privilege.  If you have questions or concerns, give me a call.      Over 35 minutes was spent doing this consult, reviewing multiple old records, imaging, echo, stress nuclear, laboratory work, lipids, renal function, hemoglobin A1c, etc.  A review of systems and a complete review of his medicines, indications and side effects was performed.  All questions were answered and he left feeling well.        cc:   Andrew Elizalde MD   [place] [address] [place]  [address]         HILLARY BREAUX MD, [facility]             D: 2017 10:58   T: 2017 13:24   MT: NGUYEN      Name:     JESÚS VASQUEZ   MRN:      [id number]        Account:      [id number]   :      1940           Service Date: 2017      Document: [id number]

## 2017-08-30 NOTE — MR AVS SNAPSHOT
After Visit Summary   8/30/2017    Pascual Perea    MRN: [id number]           Patient Information     Date Of Birth          1940        Visit Information        Provider Department      8/30/2017 10:15 AM Speedy Olivas MD [facility]        Today's Diagnoses     Hyperlipidemia LDL goal <70    -  1    BENIGN HYPERTENSION        Atherosclerosis of native coronary artery of native heart without angina pectoris        Pulmonary hypertension (H)        BRANDON (obstructive sleep apnea)        Type 2 diabetes mellitus without complication, with long-term current use of insulin (H)        Coronary artery disease involving native coronary artery of native heart without angina pectoris           Follow-ups after your visit        Additional Services     Follow-Up with Cardiologist                 Future tests that were ordered for you today     Open Future Orders        Priority Expected Expires Ordered    Follow-Up with Cardiologist Routine 8/30/2018 8/31/2018 8/30/2017            Who to contact     If you have questions or need follow up information about today's clinic visit or your schedule please contact [facility] directly at [phone].  Normal or non-critical lab and imaging results will be communicated to you by [employer], letter or phone within 4 business days after the clinic has received the results. If you do not hear from us within 7 days, please contact the clinic through [employer] or phone. If you have a critical or abnormal lab result, we will notify you by phone as soon as possible.  Submit refill requests through [employer] or call your pharmacy and they will forward the refill request to us. Please allow 3 business days for your refill to be completed.          Additional Information About Your Visit        [employer] Information     [employer] gives you secure access to your electronic health record. If you see  "a primary care provider, you can also send messages to your care team and make appointments. If you have questions, please call your primary care clinic.  If you do not have a primary care provider, please call [phone] and they will assist you.        Care EveryWhere ID     This is your Care EveryWhere ID. This could be used by other organizations to access your [facility] medical records  [id number]        Your Vitals Were     Pulse Height BMI (Body Mass Index)             60 1.803 m (5' 11\") 28.23 kg/m2          Blood Pressure from Last 3 Encounters:   08/30/17 116/58   02/28/17 112/66   10/07/16 128/68    Weight from Last 3 Encounters:   08/30/17 91.8 kg (202 lb 6.4 oz)   02/28/17 93 kg (205 lb)   10/07/16 93 kg (205 lb)              We Performed the Following     Follow-Up with Cardiologist        Primary Care Provider Office Phone # Fax #    Andrew Elizalde [phone]       [address] [place] [id number]        Equal Access to Services     [facility] : Hadii aad ku hadasho Sodwayne, waaxda luqadaha, qaybta kaalmada caridad, suhail fish . So [facility] [phone].    ATENCIÓN: Si habla español, tiene a cooley disposición servicios gratuitos de asistencia lingüística. Iftikhar al [phone].    We comply with applicable federal civil rights laws and [place] laws. We do not discriminate on the basis of race, color, national origin, age, disability sex, sexual orientation or gender identity.            Thank you!     Thank you for choosing [facility] [place]  for your care. Our goal is always to provide you with excellent care. Hearing back from our patients is one way we can continue to improve our services. Please take a few minutes to complete the written survey that you may receive in the mail after your visit with us. Thank you!             Your Updated Medication List - Protect others around you: Learn how to safely use, " store and throw away your medicines at www.disposemymeds.org.          This list is accurate as of: 8/30/17 10:58 AM.  Always use your most recent med list.                   Brand Name Dispense Instructions for use Diagnosis    apixaban ANTICOAGULANT 5 MG tablet    ELIQUIS    60 tablet    Take 1 tablet (5 mg) by mouth 2 times daily    Typical atrial flutter (H)       ascorbic acid 500 MG tablet    VITAMIN C    60 tablet    1 tab twice a day for 1 month    Iron deficiency anemia, unspecified iron deficiency anemia type       aspirin 81 MG tablet      Take by mouth At Bedtime        blood glucose monitoring test strip    ONE TOUCH ULTRA    200 each    Test twice daily with One Touch Ultra Blue    Type 2 diabetes mellitus without complication (H)       cyanocobalamin 1000 MCG tablet    vitamin  B-12     Take 5,000 mcg by mouth daily.        ferrous gluconate 324 (38 FE) MG tablet    FERGON    60 tablet    1 tab twice a day for 1 month    Iron deficiency anemia, unspecified iron deficiency anemia type       fluticasone 50 MCG/ACT spray    FLONASE    48 g    Spray 1-2 sprays into both nostrils daily    Allergic rhinitis       gemfibrozil 600 MG tablet    LOPID    180 tablet    TAKE ONE TABLET BY MOUTH TWICE DAILY    Hyperlipidemia LDL goal <70       glucose 4 G Chew chewable tablet     30    as directed    Type II or unspecified type diabetes mellitus without mention of complication, not stated as uncontrolled       insulin glargine 100 UNIT/ML injection    LANTUS SOLOSTAR    12 mL    Inject 35-40 Units Subcutaneous At Bedtime    Type 2 diabetes mellitus without complication (H)       insulin lispro 100 UNIT/ML injection    HumaLOG    3 Month    Inject  Subcutaneous 3 times daily (before meals). 2 units per carb choice (approx 5-10 units/meal)    Type 2 diabetes mellitus without complication (H)       insulin pen needle 30G X 8 MM    NOVOFINE 30    400 each    1 Syringe 4 times daily    Type 2 diabetes mellitus without  "complication (H)       levothyroxine 125 MCG tablet    SYNTHROID/LEVOTHROID    90 tablet    TAKE ONE TABLET BY MOUTH EVERY DAY    Hypothyroidism, unspecified type       lisinopril 20 MG tablet    PRINIVIL/ZESTRIL    90 tablet    Take 1 tablet (20 mg) by mouth daily    Essential hypertension, benign       loratadine 10 MG tablet    CLARITIN    90 tablet    TAKE ONE TABLET BY MOUTH EVERY DAY    Allergic rhinitis       metFORMIN 1000 MG tablet    GLUCOPHAGE    180 tablet    Take 1 tablet (1,000 mg) by mouth 2 times daily (with meals)    Type 2 diabetes mellitus without complication (H)       metoprolol 50 MG tablet    LOPRESSOR    180 tablet    TAKE ONE TABLET BY MOUTH TWICE A DAY    Essential hypertension, benign       nitroGLYcerin 0.4 MG sublingual tablet    NITROSTAT    25 tablet    Place 1 tablet (0.4 mg) under the tongue every 5 minutes as needed for chest pain    Unstable angina (H)       ONETOUCH DELICA LANCETS 33G Misc     300 each    1 Device 3 times daily . Use to test blood sugars 3 times daily or as directed.    Type 2 diabetes, HbA1C goal < 8% (H)       order for DME     30 each    3ml syringes and 22G 1 1/2\" needles to use with testosterone inj every 2 weeks    Testosterone deficiency       pravastatin 80 MG tablet    PRAVACHOL    90 tablet    Take 1 tablet (80 mg) by mouth At Bedtime    Hyperlipidemia LDL goal <70       ranitidine 150 MG tablet    ZANTAC    180 tablet    TAKE ONE TABLET BY MOUTH TWICE A DAY    Gastroesophageal reflux disease without esophagitis       VITAMIN D3 PO      Take 5,000 Units by mouth daily          "

## 2017-08-30 NOTE — PROGRESS NOTES
PMH: CAD (s/p CABG in 1997), HTN, HLD, A.Fib/Flutter s/p cardioversion 5/2016, V. Tach, pHTN, BRANDON, DM2, hypothyroidism    HPI and Plan:   See dictation  I recommend continuing current treatment plans in the chart.      No orders of the defined types were placed in this encounter.    No orders of the defined types were placed in this encounter.    Medications Discontinued During This Encounter   Medication Reason     order for DME Stopped by Patient         Encounter Diagnoses   Name Primary?     BENIGN HYPERTENSION      Atherosclerosis of native coronary artery of native heart without angina pectoris      Hyperlipidemia LDL goal <70 Yes     Pulmonary hypertension (H)      BRANDON (obstructive sleep apnea)      Type 2 diabetes mellitus without complication, with long-term current use of insulin (H)      Coronary artery disease involving native coronary artery of native heart without angina pectoris        CURRENT MEDICATIONS:  Current Outpatient Prescriptions   Medication Sig Dispense Refill     levothyroxine (SYNTHROID/LEVOTHROID) 125 MCG tablet TAKE ONE TABLET BY MOUTH EVERY DAY 90 tablet 1     ranitidine (ZANTAC) 150 MG tablet TAKE ONE TABLET BY MOUTH TWICE A  tablet 1     loratadine (CLARITIN) 10 MG tablet TAKE ONE TABLET BY MOUTH EVERY DAY 90 tablet 2     metoprolol (LOPRESSOR) 50 MG tablet TAKE ONE TABLET BY MOUTH TWICE A  tablet 2     gemfibrozil (LOPID) 600 MG tablet TAKE ONE TABLET BY MOUTH TWICE DAILY 180 tablet 3     insulin glargine (LANTUS SOLOSTAR) 100 UNIT/ML injection Inject 35-40 Units Subcutaneous At Bedtime 12 mL 3     apixaban ANTICOAGULANT (ELIQUIS) 5 MG tablet Take 1 tablet (5 mg) by mouth 2 times daily 60 tablet 5     metFORMIN (GLUCOPHAGE) 1000 MG tablet Take 1 tablet (1,000 mg) by mouth 2 times daily (with meals) 180 tablet 1     fluticasone (FLONASE) 50 MCG/ACT nasal spray Spray 1-2 sprays into both nostrils daily 48 g 2     lisinopril (PRINIVIL,ZESTRIL) 20 MG tablet Take 1 tablet  "(20 mg) by mouth daily 90 tablet 3     pravastatin (PRAVACHOL) 80 MG tablet Take 1 tablet (80 mg) by mouth At Bedtime 90 tablet 3     insulin pen needle (NOVOFINE 30) 30G X 8 MM 1 Syringe 4 times daily 400 each 3     insulin lispro (HUMALOG) 100 UNIT/ML VIAL Inject  Subcutaneous 3 times daily (before meals). 2 units per carb choice (approx 5-10 units/meal) 3 Month 3     order for DME 3ml syringes and 22G 1 1/2\" needles to use with testosterone inj every 2 weeks 30 each 1     aspirin 81 MG tablet Take by mouth At Bedtime       blood glucose monitoring (ONE TOUCH ULTRA) test strip Test twice daily with One Touch Ultra Blue 200 each 2     nitroglycerin (NITROSTAT) 0.4 MG SL tablet Place 1 tablet (0.4 mg) under the tongue every 5 minutes as needed for chest pain 25 tablet 1     Cholecalciferol (VITAMIN D3 PO) Take 5,000 Units by mouth daily       ONETOUCH DELICA LANCETS 33G MISC 1 Device 3 times daily . Use to test blood sugars 3 times daily or as directed. 300 each prn     cyanocolbalamin (VITAMIN  B-12) 1000 MCG tablet Take 5,000 mcg by mouth daily.       GLUCOSE 4 GM OR CHEW as directed 30 11     ferrous gluconate (FERGON) 324 (38 FE) MG tablet 1 tab twice a day for 1 month (Patient not taking: Reported on 8/30/2017) 60 tablet 0     ascorbic acid (VITAMIN C) 500 MG tablet 1 tab twice a day for 1 month (Patient not taking: Reported on 8/30/2017) 60 tablet 0       ALLERGIES     Allergies   Allergen Reactions     Omeprazole      diarrhea       PAST MEDICAL HISTORY:  Past Medical History:   Diagnosis Date     Adhesive capsulitis of shoulder      Anemia 3/10/2014     Anemia, unspecified      Antiplatelet or antithrombotic long-term use      Arrhythmia     Atrial fib/flutter     CAD (coronary artery disease)      Hyperlipidemia LDL goal <70 5/26/2011     Hypertension      Hypothyroidism      Impotence of organic origin      Laceration of finger  June 2010     left thumb s/p sutures     Numbness and tingling     diabetic " neuropathy bilateral feet     BRANDON (obstructive sleep apnea)     intolerant of CPAP, uses it occasionally.  Using mandibular device occasionally     Osteopenia      Pulmonary hypertension (H) 4/6/2012     Sensorineural hearing loss, unspecified      Stented coronary artery 1997    CABG      Testosterone deficiency      Type 2 diabetes mellitus without complication  (goal A1C<8) 10/24/2015     Typical atrial flutter (H) 4/18/16     Unspecified hypothyroidism      Vitamin D deficiency 9/3/2011       PAST SURGICAL HISTORY:  Past Surgical History:   Procedure Laterality Date     C NONSPECIFIC PROCEDURE  1995    CABG ([facility])     C NONSPECIFIC PROCEDURE  8/01    stress echo     CARDIAC SURGERY      bypass in 1997     COLONOSCOPY       CORONARY ANGIOGRAPHY ADULT ORDER      4/2/2012     CORONARY ARTERY BYPASS  1997    [facility] to LAD     EXCISE MASS HEAD Left 8/18/2016    Procedure: EXCISE MASS HEAD;  Surgeon: Ivan Hernandez MD;  Location: [facility]     HEART CATH, ANGIOPLASTY  4/2012     PHACOEMULSIFICATION CLEAR CORNEA WITH STANDARD INTRAOCULAR LENS IMPLANT Left 6/8/2015    Procedure: PHACOEMULSIFICATION CLEAR CORNEA WITH STANDARD INTRAOCULAR LENS IMPLANT;  Surgeon: Nixon Farnsworth MD;  Location: [facility]     PHACOEMULSIFICATION CLEAR CORNEA WITH STANDARD INTRAOCULAR LENS IMPLANT Right 6/22/2015    Procedure: PHACOEMULSIFICATION CLEAR CORNEA WITH STANDARD INTRAOCULAR LENS IMPLANT;  Surgeon: Nixon Farnsworth MD;  Location: [facility]     SEPTOPLASTY, TURBINOPLASTY, COMBINED Bilateral 8/18/2016    Procedure: COMBINED SEPTOPLASTY, TURBINOPLASTY;  Surgeon: Ivan Hernandez MD;  Location: [facility]       FAMILY HISTORY:  Family History   Problem Relation Age of Onset     Genitourinary Problems Father      d: age 89; ARF     Hypertension Father      DIABETES Mother      d: age 68, CAD     HEART DISEASE Mother      MI     Myocardial Infarction Mother      Cardiovascular Brother      d:  age 31; CAD      "HEART DISEASE Brother      age 31, MI     DIABETES Sister      b:  1939; DM2       SOCIAL HISTORY:  Social History     Social History     Marital status:      Spouse name: N/A     Number of children: N/A     Years of education: N/A     Social History Main Topics     Smoking status: Former Smoker     Packs/day: 1.00     Years: 6.00     Quit date: 7/1/1964     Smokeless tobacco: Never Used      Comment: quit 1964     Alcohol use No     Drug use: No     Sexual activity: Yes     Partners: Female     Other Topics Concern     Caffeine Concern Yes     2 cups coffee a day     Sleep Concern Yes     sleep apnea, wears cpap off and on     Stress Concern No     Weight Concern No     Special Diet Yes     diabetic diet      Exercise No     occ walk the mall     Seat Belt Yes     Social History Narrative         Review of Systems:  Skin:  Positive for rash face   Eyes:  Positive for glasses reading  ENT:  Negative      Respiratory:  Negative sleep apnea     Cardiovascular:    fatigue;Positive for    Gastroenterology: Positive for heartburn    Genitourinary:  not assessed      Musculoskeletal:  Negative      Neurologic:  Positive for numbness or tingling of feet    Psychiatric:  Negative      Heme/Lymph/Imm:  Negative      Endocrine:  Positive for diabetes;thyroid disorder;night sweats      Physical Exam:  Vitals: /58  Pulse 60  Ht 1.803 m (5' 11\")  Wt 91.8 kg (202 lb 6.4 oz)  BMI 28.23 kg/m2    Constitutional:           Skin:           Head:           Eyes:           ENT:           Neck:           Chest:             Cardiac:                    Abdomen:           Vascular:                                          Extremities and Back:                 Neurological:             Recent Lab Results:  LIPID RESULTS:  Lab Results   Component Value Date    CHOL 133 08/30/2017    HDL 32 (L) 08/30/2017    LDL 66 08/30/2017    TRIG 177 (H) 08/30/2017    CHOLHDLRATIO 3.3 05/08/2015       LIVER ENZYME RESULTS:  Lab Results "   Component Value Date    AST 15 02/24/2017    ALT <5 (L) 08/30/2017       CBC RESULTS:  Lab Results   Component Value Date    WBC 4.4 08/15/2016    RBC 5.14 08/15/2016    HGB 12.4 (L) 04/10/2017    HCT 40.8 08/15/2016    MCV 79 08/15/2016    MCH 25.1 (L) 08/15/2016    MCHC 31.6 08/15/2016    RDW 17.3 (H) 08/15/2016     08/15/2016       BMP RESULTS:  Lab Results   Component Value Date     02/24/2017    POTASSIUM 3.8 02/24/2017    CHLORIDE 105 02/24/2017    CO2 30 02/24/2017    ANIONGAP 5 02/24/2017     (H) 02/24/2017    BUN 15 02/24/2017    CR 1.00 02/24/2017    GFRESTIMATED 73 02/24/2017    GFRESTBLACK 88 02/24/2017    STARR 9.3 02/24/2017        A1C RESULTS:  Lab Results   Component Value Date    A1C 6.8 (H) 02/24/2017       INR RESULTS:  Lab Results   Component Value Date    INR 0.94 03/30/2012    INR 0.90 05/12/2005           CC  Andrew Elizalde MD  [address] [place] [address]

## 2017-09-04 DIAGNOSIS — E78.5 HYPERLIPIDEMIA LDL GOAL <70: ICD-10-CM

## 2017-09-06 RX ORDER — PRAVASTATIN SODIUM 80 MG/1
TABLET ORAL
Qty: 90 TABLET | Refills: 1 | Status: SHIPPED | OUTPATIENT
Start: 2017-09-06 | End: 2018-03-05

## 2017-09-06 NOTE — TELEPHONE ENCOUNTER
pravastatin     Last Written Prescription Date: 08/29/16  Last Fill Quantity: 90, # refills: 3  Last Office Visit with [facility] prescribing provider: 02/28/17       Lab Results   Component Value Date    CHOL 133 08/30/2017     Lab Results   Component Value Date    HDL 32 08/30/2017     Lab Results   Component Value Date    LDL 66 08/30/2017     Lab Results   Component Value Date    TRIG 177 08/30/2017     Lab Results   Component Value Date    CHOLHDLRATIO 3.3 05/08/2015

## 2017-09-11 DIAGNOSIS — E11.9 TYPE 2 DIABETES MELLITUS WITHOUT COMPLICATION (H): ICD-10-CM

## 2017-09-12 NOTE — TELEPHONE ENCOUNTER
Metformin          Last Written Prescription Date: 3/9/17  Last Fill Quantity: 180, # refills: 1  Last Office Visit with [facility], [facility] or  [facility] prescribing provider:  2/ 28/17  Next 5 appointments (look out 90 days)     Sep 13, 2017  8:45 AM CDT   [facility]    [address] [address]   [phone]            Sep 21, 2017  1:00 PM CDT   MyChart Long with Andrew El[facility]alde [facility]   [facility]    [address] [address]   [phone]                   BP Readings from Last 3 Encounters:   08/30/17 116/58   02/28/17 112/66   10/07/16 128/68     Lab Results   Component Value Date    MICROL 24 08/15/2016     Lab Results   Component Value Date    UMALCR 12.77 08/15/2016     Creatinine   Date Value Ref Range Status   02/24/2017 1.00 0.66 - 1.25 mg/dL Final   ]  GFR Estimate   Date Value Ref Range Status   02/24/2017 73 >60 mL/min/1.7m2 Final     Comment:     Non  GFR Calc   08/15/2016 64 >60 mL/min/1.7m2 Final     Comment:     Non  GFR Calc   04/17/2016 60 (L) >60 mL/min/1.7m2 Final     Comment:     Non  GFR Calc     GFR Estimate If Black   Date Value Ref Range Status   02/24/2017 88 >60 mL/min/1.7m2 Final     Comment:      GFR Calc   08/15/2016 77 >60 mL/min/1.7m2 Final     Comment:      GFR Calc   04/17/2016 73 >60 mL/min/1.7m2 Final     Comment:      GFR Calc     Lab Results   Component Value Date    CHOL 133 08/30/2017     Lab Results   Component Value Date    HDL 32 08/30/2017     Lab Results   Component Value Date    LDL 66 08/30/2017     Lab Results   Component Value Date    TRIG 177 08/30/2017     Lab Results   Component Value Date    CHOLHDLRATIO 3.3 05/08/2015     Lab Results   Component Value Date    AST 15 02/24/2017     Lab  Results   Component Value Date    ALT <5 08/30/2017     Lab Results   Component Value Date    A1C 6.8 02/24/2017    A1C 7.0 08/15/2016    A1C 7.2 01/18/2016    A1C 6.8 05/08/2015    A1C 6.9 11/07/2014     Potassium   Date Value Ref Range Status   02/24/2017 3.8 3.4 - 5.3 mmol/L Final

## 2017-09-13 DIAGNOSIS — Z12.5 SCREENING FOR PROSTATE CANCER: ICD-10-CM

## 2017-09-13 DIAGNOSIS — E34.9 TESTOSTERONE DEFICIENCY: ICD-10-CM

## 2017-09-13 DIAGNOSIS — Z79.4 TYPE 2 DIABETES MELLITUS WITHOUT COMPLICATION, WITH LONG-TERM CURRENT USE OF INSULIN (H): ICD-10-CM

## 2017-09-13 DIAGNOSIS — E11.9 TYPE 2 DIABETES MELLITUS WITHOUT COMPLICATION, WITH LONG-TERM CURRENT USE OF INSULIN (H): ICD-10-CM

## 2017-09-13 LAB
ALBUMIN SERPL-MCNC: 3.9 G/DL (ref 3.4–5)
ALP SERPL-CCNC: 71 U/L (ref 40–150)
ALT SERPL W P-5'-P-CCNC: 16 U/L (ref 0–70)
ANION GAP SERPL CALCULATED.3IONS-SCNC: 6 MMOL/L (ref 3–14)
AST SERPL W P-5'-P-CCNC: 11 U/L (ref 0–45)
BILIRUB SERPL-MCNC: 0.7 MG/DL (ref 0.2–1.3)
BUN SERPL-MCNC: 20 MG/DL (ref 7–30)
CALCIUM SERPL-MCNC: 10.2 MG/DL (ref 8.5–10.1)
CHLORIDE SERPL-SCNC: 108 MMOL/L (ref 94–109)
CHOLEST SERPL-MCNC: 104 MG/DL
CO2 SERPL-SCNC: 28 MMOL/L (ref 20–32)
CREAT SERPL-MCNC: 1.03 MG/DL (ref 0.66–1.25)
CREAT UR-MCNC: 136 MG/DL
ERYTHROCYTE [DISTWIDTH] IN BLOOD BY AUTOMATED COUNT: 13.6 % (ref 10–15)
GFR SERPL CREATININE-BSD FRML MDRD: 70 ML/MIN/1.7M2
GLUCOSE SERPL-MCNC: 95 MG/DL (ref 70–99)
HBA1C MFR BLD: 6.4 % (ref 4.3–6)
HCT VFR BLD AUTO: 34.6 % (ref 40–53)
HDLC SERPL-MCNC: 40 MG/DL
HGB BLD-MCNC: 11.9 G/DL (ref 13.3–17.7)
LDLC SERPL CALC-MCNC: 31 MG/DL
MCH RBC QN AUTO: 31 PG (ref 26.5–33)
MCHC RBC AUTO-ENTMCNC: 34.4 G/DL (ref 31.5–36.5)
MCV RBC AUTO: 90 FL (ref 78–100)
MICROALBUMIN UR-MCNC: 19 MG/L
MICROALBUMIN/CREAT UR: 13.9 MG/G CR (ref 0–17)
NONHDLC SERPL-MCNC: 64 MG/DL
PLATELET # BLD AUTO: 161 10E9/L (ref 150–450)
POTASSIUM SERPL-SCNC: 4.3 MMOL/L (ref 3.4–5.3)
PROT SERPL-MCNC: 7 G/DL (ref 6.8–8.8)
PSA SERPL-ACNC: 0.46 UG/L (ref 0–4)
RBC # BLD AUTO: 3.84 10E12/L (ref 4.4–5.9)
SODIUM SERPL-SCNC: 142 MMOL/L (ref 133–144)
TRIGL SERPL-MCNC: 166 MG/DL
WBC # BLD AUTO: 3.3 10E9/L (ref 4–11)

## 2017-09-13 PROCEDURE — 84403 ASSAY OF TOTAL TESTOSTERONE: CPT | Performed by: INTERNAL MEDICINE

## 2017-09-13 PROCEDURE — 36415 COLL VENOUS BLD VENIPUNCTURE: CPT | Performed by: INTERNAL MEDICINE

## 2017-09-13 PROCEDURE — G0103 PSA SCREENING: HCPCS | Performed by: INTERNAL MEDICINE

## 2017-09-13 PROCEDURE — 83036 HEMOGLOBIN GLYCOSYLATED A1C: CPT | Performed by: INTERNAL MEDICINE

## 2017-09-13 PROCEDURE — 80053 COMPREHEN METABOLIC PANEL: CPT | Performed by: INTERNAL MEDICINE

## 2017-09-13 PROCEDURE — 84270 ASSAY OF SEX HORMONE GLOBUL: CPT | Performed by: INTERNAL MEDICINE

## 2017-09-13 PROCEDURE — 80061 LIPID PANEL: CPT | Performed by: INTERNAL MEDICINE

## 2017-09-13 PROCEDURE — 85027 COMPLETE CBC AUTOMATED: CPT | Performed by: INTERNAL MEDICINE

## 2017-09-13 PROCEDURE — 82043 UR ALBUMIN QUANTITATIVE: CPT | Performed by: INTERNAL MEDICINE

## 2017-09-15 LAB
SHBG SERPL-SCNC: 10 NMOL/L (ref 11–80)
TESTOST FREE SERPL-MCNC: 0.24 NG/DL (ref 4.7–24.4)
TESTOST SERPL-MCNC: 8 NG/DL (ref 240–950)

## 2017-09-18 DIAGNOSIS — I10 ESSENTIAL HYPERTENSION, BENIGN: ICD-10-CM

## 2017-09-19 RX ORDER — LISINOPRIL 20 MG/1
TABLET ORAL
Qty: 90 TABLET | Refills: 1 | Status: SHIPPED | OUTPATIENT
Start: 2017-09-19 | End: 2018-03-26

## 2017-09-19 NOTE — TELEPHONE ENCOUNTER
Lisinopril      Last Written Prescription Date: 9/8/16  Last Fill Quantity: 90, # refills: 3  Last Office Visit with [facility], [facility] or [facility] prescribing provider: 2/28/17  Next 5 appointments (look out 90 days)     Sep 21, 2017  1:00 PM CDT   Jennifer Chang with Andrew Elizalde MD   [facility]    [address] [place] [id number] [phone]                   Potassium   Date Value Ref Range Status   09/13/2017 4.3 3.4 - 5.3 mmol/L Final     Creatinine   Date Value Ref Range Status   09/13/2017 1.03 0.66 - 1.25 mg/dL Final     BP Readings from Last 3 Encounters:   08/30/17 116/58   02/28/17 112/66   10/07/16 128/68

## 2017-09-21 ENCOUNTER — OFFICE VISIT (OUTPATIENT)
Dept: INTERNAL MEDICINE | Facility: CLINIC | Age: 77
End: 2017-09-21
Payer: COMMERCIAL

## 2017-09-21 VITALS
SYSTOLIC BLOOD PRESSURE: 116 MMHG | WEIGHT: 199 LBS | OXYGEN SATURATION: 99 % | DIASTOLIC BLOOD PRESSURE: 70 MMHG | HEART RATE: 58 BPM | TEMPERATURE: 97.5 F | BODY MASS INDEX: 27.75 KG/M2

## 2017-09-21 DIAGNOSIS — I48.3 TYPICAL ATRIAL FLUTTER (H): ICD-10-CM

## 2017-09-21 DIAGNOSIS — Z23 NEED FOR PROPHYLACTIC VACCINATION AND INOCULATION AGAINST INFLUENZA: ICD-10-CM

## 2017-09-21 DIAGNOSIS — D50.9 IRON DEFICIENCY ANEMIA, UNSPECIFIED IRON DEFICIENCY ANEMIA TYPE: ICD-10-CM

## 2017-09-21 DIAGNOSIS — E11.9 TYPE 2 DIABETES MELLITUS WITHOUT COMPLICATION, WITH LONG-TERM CURRENT USE OF INSULIN (H): Primary | ICD-10-CM

## 2017-09-21 DIAGNOSIS — J30.9 CHRONIC ALLERGIC RHINITIS, UNSPECIFIED SEASONALITY, UNSPECIFIED TRIGGER: ICD-10-CM

## 2017-09-21 DIAGNOSIS — E34.9 TESTOSTERONE DEFICIENCY: ICD-10-CM

## 2017-09-21 DIAGNOSIS — Z79.4 TYPE 2 DIABETES MELLITUS WITHOUT COMPLICATION, WITH LONG-TERM CURRENT USE OF INSULIN (H): Primary | ICD-10-CM

## 2017-09-21 PROCEDURE — G0008 ADMIN INFLUENZA VIRUS VAC: HCPCS | Performed by: INTERNAL MEDICINE

## 2017-09-21 PROCEDURE — 99214 OFFICE O/P EST MOD 30 MIN: CPT | Mod: 25 | Performed by: INTERNAL MEDICINE

## 2017-09-21 PROCEDURE — 90662 IIV NO PRSV INCREASED AG IM: CPT | Performed by: INTERNAL MEDICINE

## 2017-09-21 NOTE — NURSING NOTE
"Chief Complaint   Patient presents with     Diabetes       Initial /70  Pulse 58  Temp 97.5  F (36.4  C) (Oral)  Wt 199 lb (90.3 kg)  SpO2 99%  BMI 27.75 kg/m2 Estimated body mass index is 27.75 kg/(m^2) as calculated from the following:    Height as of 8/30/17: 5' 11\" (1.803 m).    Weight as of this encounter: 199 lb (90.3 kg).  Medication Reconciliation: complete  "

## 2017-09-21 NOTE — PATIENT INSTRUCTIONS
Vitron-C (iron and Vit C), 1 tab twice a day for low iron and hemoglobin. May cause stools to get a little darker  Nonfasting lab for iron and hemoglobin in late October  Letter to insurance for  Testosterone approval. Willl send on Monday  Flu shot  Continue other medications  Nonfasting A1C lab for diabetes in Late March 2018

## 2017-09-21 NOTE — MR AVS SNAPSHOT
After Visit Summary   9/21/2017    Pascual Perea    MRN: [id number]           Patient Information     Date Of Birth          1940        Visit Information        Provider Department      9/21/2017 1:00 PM Andrew Elizalde MD [facility]        Today's Diagnoses     Type 2 diabetes mellitus without complication, with long-term current use of insulin (H)    -  1    Typical atrial flutter (H)        Testosterone deficiency        Iron deficiency anemia, unspecified iron deficiency anemia type          Care Instructions    Vitron-C (iron and Vit C), 1 tab twice a day for low iron and hemoglobin. May cause stools to get a little darker  Nonfasting lab for iron and hemoglobin in late October  Letter to insurance for  Testosterone approval. Willl send on Monday  Flu shot  Continue other medications  Nonfasting A1C lab for diabetes in Late March 2018            Follow-ups after your visit        Future tests that were ordered for you today     Open Future Orders        Priority Expected Expires Ordered    CBC with platelets Routine 10/21/2017 9/21/2018 9/21/2017    Iron and iron binding capacity Routine 10/21/2017 9/21/2018 9/21/2017    Ferritin Routine 10/21/2017 9/21/2018 9/21/2017    Hemoglobin A1c Routine 3/20/2018 7/18/2018 9/21/2017            Who to contact     If you have questions or need follow up information about today's clinic visit or your schedule please contact [facility] directly at [phone].  Normal or non-critical lab and imaging results will be communicated to you by MyChart, letter or phone within 4 business days after the clinic has received the results. If you do not hear from us within 7 days, please contact the clinic through MyChart or phone. If you have a critical or abnormal lab result, we will notify you by phone as soon as possible.  Submit refill requests through [employer] or call your pharmacy and they will forward the  refill request to us. Please allow 3 business days for your refill to be completed.          Additional Information About Your Visit        [employer] Information     [facility] gives you secure access to your electronic health record. If you see a primary care provider, you can also send messages to your care team and make appointments. If you have questions, please call your primary care clinic.  If you do not have a primary care provider, please call [phone] and they will assist you.        Care EveryWhere ID     This is your Care EveryWhere ID. This could be used by other organizations to access your [facility] medical records  [id number]        Your Vitals Were     Pulse Temperature Pulse Oximetry BMI (Body Mass Index)          58 97.5  F (36.4  C) (Oral) 99% 27.75 kg/m2         Blood Pressure from Last 3 Encounters:   09/21/17 116/70   08/30/17 116/58   02/28/17 112/66    Weight from Last 3 Encounters:   09/21/17 199 lb (90.3 kg)   08/30/17 202 lb 6.4 oz (91.8 kg)   02/28/17 205 lb (93 kg)                 Today's Medication Changes          These changes are accurate as of: 9/21/17  1:41 PM.  If you have any questions, ask your nurse or doctor.               Start taking these medicines.        Dose/Directions    ferrous fumarate 65 mg ([place]. FE)-Vitamin C 125 mg  MG Tabs tablet   Commonly known as:  VITRON-C   Used for:  Iron deficiency anemia, unspecified iron deficiency anemia type   Started by:  Andrew Elizalde MD        1 tab twice a day for anemia   Quantity:  60 tablet   Refills:  11            Where to get your medicines      Some of these will need a paper prescription and others can be bought over the counter.  Ask your nurse if you have questions.     Bring a paper prescription for each of these medications     ferrous fumarate 65 mg ([place]. FE)-Vitamin C 125 mg  MG Tabs tablet         Call your pharmacy to confirm that your medication is ready for pickup. It may take up to 24 hours for them  to receive the prescription. If the prescription is not ready within 3 business days, please contact your clinic or your provider.     We will let you know when these medications are ready. If you don't hear back within 3 business days, please contact us.     apixaban ANTICOAGULANT 5 MG tablet                Primary Care Provider Office Phone # Fax #    Andrew Elizalde [phone]       [address] [facility] [address]        Equal Access to Services     AMY FLORES : Hadii aad ku hadasho Soomaali, waaxda luqadaha, qaybta kaalmada adeegyada, waxay idiin hayaan adeeg kharash la'jareth . So [facility] [phone].    ATENCIÓN: Si bre espemperatriz, tiene a cooley disposición servicios gratuitos de asistencia lingüística. Ll[facility] [phone].    We comply with applicable federal civil rights laws and [place] laws. We do not discriminate on the basis of race, color, national origin, age, disability sex, sexual orientation or gender identity.            Thank you!     Thank you for choosing [facility]  for your care. Our goal is always to provide you with excellent care. Hearing back from our patients is one way we can continue to improve our services. Please take a few minutes to complete the written survey that you may receive in the mail after your visit with us. Thank you!             Your Updated Medication List - Protect others around you: Learn how to safely use, store and throw away your medicines at www.disposemymeds.org.          This list is accurate as of: 9/21/17  1:41 PM.  Always use your most recent med list.                   Brand Name Dispense Instructions for use Diagnosis    apixaban ANTICOAGULANT 5 MG tablet    ELIQUIS    60 tablet    Take 1 tablet (5 mg) by mouth 2 times daily    Typical atrial flutter (H)       aspirin 81 MG tablet      Take by mouth At Bedtime        blood glucose monitoring test strip    ONE TOUCH ULTRA    200 each    Test twice daily with One  Touch Ultra Blue    Type 2 diabetes mellitus without complication (H)       cyanocobalamin 1000 MCG tablet    vitamin  B-12     Take 5,000 mcg by mouth daily.        ferrous fumarate 65 mg ([place]. FE)-Vitamin C 125 mg  MG Tabs tablet    VITRON-C    60 tablet    1 tab twice a day for anemia    Iron deficiency anemia, unspecified iron deficiency anemia type       fluticasone 50 MCG/ACT spray    FLONASE    48 g    Spray 1-2 sprays into both nostrils daily    Allergic rhinitis       gemfibrozil 600 MG tablet    LOPID    180 tablet    TAKE ONE TABLET BY MOUTH TWICE DAILY    Hyperlipidemia LDL goal <70       glucose 4 G Chew chewable tablet     30    as directed    Type II or unspecified type diabetes mellitus without mention of complication, not stated as uncontrolled       insulin glargine 100 UNIT/ML injection    LANTUS SOLOSTAR    12 mL    Inject 35-40 Units Subcutaneous At Bedtime    Type 2 diabetes mellitus without complication (H)       insulin lispro 100 UNIT/ML injection    HumaLOG    3 Month    Inject  Subcutaneous 3 times daily (before meals). 2 units per carb choice (approx 5-10 units/meal)    Type 2 diabetes mellitus without complication (H)       insulin pen needle 30G X 8 MM    NOVOFINE 30    400 each    1 Syringe 4 times daily    Type 2 diabetes mellitus without complication (H)       levothyroxine 125 MCG tablet    SYNTHROID/LEVOTHROID    90 tablet    TAKE ONE TABLET BY MOUTH EVERY DAY    Hypothyroidism, unspecified type       lisinopril 20 MG tablet    PRINIVIL/ZESTRIL    90 tablet    TAKE ONE TABLET BY MOUTH EVERY DAY    Essential hypertension, benign       loratadine 10 MG tablet    CLARITIN    90 tablet    TAKE ONE TABLET BY MOUTH EVERY DAY    Allergic rhinitis       metFORMIN 1000 MG tablet    GLUCOPHAGE    60 tablet    TAKE ONE TABLET BY MOUTH TWICE A DAY WITH MEALS    Type 2 diabetes mellitus without complication (H)       metoprolol 50 MG tablet    LOPRESSOR    180 tablet    TAKE ONE TABLET  BY MOUTH TWICE A DAY    Essential hypertension, benign       nitroGLYcerin 0.4 MG sublingual tablet    NITROSTAT    25 tablet    Place 1 tablet (0.4 mg) under the tongue every 5 minutes as needed for chest pain    Unstable angina (H)       ONETOUCH DELICA LANCETS 33G Misc     300 each    1 Device 3 times daily . Use to test blood sugars 3 times daily or as directed.    Type 2 diabetes, HbA1C goal < 8% (H)       pravastatin 80 MG tablet    PRAVACHOL    90 tablet    TAKE ONE TABLET BY MOUTH EVERY NIGHT AT BEDTIME    Hyperlipidemia LDL goal <70       ranitidine 150 MG tablet    ZANTAC    180 tablet    TAKE ONE TABLET BY MOUTH TWICE A DAY    Gastroesophageal reflux disease without esophagitis       VITAMIN D3 PO      Take 5,000 Units by mouth daily

## 2017-09-21 NOTE — PROGRESS NOTES
SUBJECTIVE:   Pascual Perea is a 77 year old male who presents to clinic today for the following health issues:  Chief Complaint   Patient presents with     Diabetes     Diabetes Follow-up    Patient is checking blood sugars: twice daily.    Blood sugar testing frequency justification: Per Provider order  Results are as follows:       am - 118 to 153       Pm- 83 to 188    Diabetic concerns: None     Symptoms of hypoglycemia (low blood sugar): Lightheaded, Weak     Paresthesias (numbness or burning in feet) or sores: Numbness   Date of last diabetic eye exam: 10/2016      Amount of exercise or physical activity: None    Problems taking medications regularly: No    Medication side effects: none  Diet: regular (no restrictions) and diabetic    Pt's past medical history, family history, habits, medications and allergies were reviewed with the patient today.  See snap shot for  HCM status. Most recent lab results reviewed with pt. Problem list and histories reviewed & adjusted, as indicated.  Additional history as below:    Lab Results   Component Value Date    GLC 95 09/13/2017     Lab Results   Component Value Date    A1C 6.4 09/13/2017     Lab Results   Component Value Date    CHOL 104 09/13/2017     Lab Results   Component Value Date    LDL 31 09/13/2017     Lab Results   Component Value Date    HDL 40 09/13/2017     Lab Results   Component Value Date    TRIG 166 09/13/2017     Lab Results   Component Value Date    CR 1.03 09/13/2017     Lab Results   Component Value Date    ALT 16 09/13/2017     Lab Results   Component Value Date    AST 11 09/13/2017     Lab Results   Component Value Date    MICROL 19 09/13/2017     Lab Results   Component Value Date    TSH 0.73 02/21/2017       Denies CP, SOB, abdominal pain, polyuria, polydipsia, vision changes, extremity numbness/parasthesias or skin problems.  HAs significant fatigue since stopping testosterone replacement. Has some weight loss 7 pounds since March and  also increased abd girth with low testosterone. Insurance had refused treatment. Needs appeal letter    Component      Latest Ref Rng & Units 4/10/2017 9/13/2017   WBC      4.0 - 11.0 10e9/L  3.3 (L)   RBC Count      4.4 - 5.9 10e12/L  3.84 (L)   Hemoglobin      13.3 - 17.7 g/dL 12.4 (L) 11.9 (L)   Hematocrit      40.0 - 53.0 %  34.6 (L)   MCV      78 - 100 fl  90   MCH      26.5 - 33.0 pg  31.0   MCHC      31.5 - 36.5 g/dL  34.4   RDW      10.0 - 15.0 %  13.6   Platelet Count      150 - 450 10e9/L  161   Iron      35 - 180 ug/dL 27 (L)    Iron Binding Cap      240 - 430 ug/dL 400    Iron Saturation Index      15 - 46 % 7 (L)    Testosterone Total      240 - 950 ng/dL  8 (L)   Sex Hormone Binding Globulin      11 - 80 nmol/L  10 (L)   Free Testosterone Calculated      4.7 - 24.4 ng/dL  0.24 (L)   Ferritin      26 - 388 ng/mL 11 (L)         Lab Results   Component Value Date    GLC 95 09/13/2017     Lab Results   Component Value Date    A1C 6.4 09/13/2017     Lab Results   Component Value Date    CHOL 104 09/13/2017     Lab Results   Component Value Date    LDL 31 09/13/2017     Lab Results   Component Value Date    HDL 40 09/13/2017     Lab Results   Component Value Date    TRIG 166 09/13/2017     Lab Results   Component Value Date    CR 1.03 09/13/2017     Lab Results   Component Value Date    ALT 16 09/13/2017     Lab Results   Component Value Date    AST 11 09/13/2017     Lab Results   Component Value Date    MICROL 19 09/13/2017     Lab Results   Component Value Date    TSH 0.73 02/21/2017       Additional ROS:   Constitutional, HEENT, Cardiovascular, Pulmonary, GI and , Neuro, MSK and Psych review of systems/symptoms are otherwise negative or unchanged from previous, except as noted above.      OBJECTIVE:  /70  Pulse 58  Temp 97.5  F (36.4  C) (Oral)  Wt 199 lb (90.3 kg)  SpO2 99%  BMI 27.75 kg/m2   Estimated body mass index is 27.75 kg/(m^2) as calculated from the following:    Height as of  "8/30/17: 5' 11\" (1.803 m).    Weight as of this encounter: 199 lb (90.3 kg).  Eye: PERRL, EOMI  HENT: ear canals and TM's normal and nose and mouth without ulcers or lesions   Neck: no adenopathy. Thyroid normal to palpation. No bruits  Pulm: Lungs clear to auscultation. 1/6 KAYLA apex  CV: Regular rates and rhythm  GI: Soft, nontender, Normal active bowel sounds, No hepatosplenomegaly or masses palpable  Ext: Peripheral pulses intact. No edema.  Neuro: Normal strength and tone, sensory exam grossly normal    Assessment/Plan: (See plan discussion below for further details)  1. Type 2 diabetes mellitus without complication, with long-term current use of insulin (H)  Controlled. Continue same meds  - Hemoglobin A1c; Future  - insulin glargine (LANTUS SOLOSTAR) 100 UNIT/ML injection; Inject 40-45 Units Subcutaneous At Bedtime  Dispense: 45 mL; Refill: 3  - insulin pen needle (NOVOFINE 30) 30G X 8 MM; 1 Syringe 4 times daily  Dispense: 400 each; Refill: 3  - blood glucose monitoring (ONE TOUCH ULTRA) test strip; Test twice daily with One Touch Ultra Blue  Dispense: 200 each; Refill: 3  - insulin lispro (HUMALOG KWIKPEN) 100 UNIT/ML injection; Subcutaneous 3 times daily (before meals). 2 units per carb choice (approx 5-10 units/meal)  Dispense: 30 mL; Refill: 3    2. Typical atrial flutter (H)  Controlled. Continue same meds  - apixaban ANTICOAGULANT (ELIQUIS) 5 MG tablet; Take 1 tablet (5 mg) by mouth 2 times daily  Dispense: 60 tablet; Refill: 11    3. Testosterone deficiency  Needs  Replacement. Will write letter of Appeal to insurance as  depotestosterone didn't meet original 5 dx given  by insurance  As allowable dx for coverage (see tele message 3/30/17)    4. Iron deficiency anemia, unspecified iron deficiency anemia type  Denies black stools. Colonoscopy March 2017 noted. See plan below  - ferrous fumarate 65 mg, [place]. FE,-Vitamin C 125 mg (VITRON-C)  MG TABS tablet; 1 tab twice a day for anemia  Dispense: 60 " tablet; Refill: 11  - CBC with platelets; Future  - Iron and iron binding capacity; Future  - Ferritin; Future    5. Need for prophylactic vaccination and inoculation against influenza  - FLU VACCINE, INCREASED ANTIGEN, PRESV FREE, AGE 65+ [36793]  - ADMIN INFLUENZA (For MEDICARE Patients ONLY) []    6. Chronic allergic rhinitis, unspecified seasonality, unspecified trigger  controlled  - fluticasone (FLONASE) 50 MCG/ACT spray; Spray 1-2 sprays into both nostrils daily  Dispense: 48 g; Refill: 3      Plan discussion:  Nonfasting lab for iron and hemoglobin in late October  Letter to insurance for  Testosterone approval. Willl send on Monday  Flu shot  Continue other medications  Nonfasting A1C lab for diabetes in Late March 2018       Andrew Elizalde MD  Internal Medicine Department  [facility]

## 2017-09-21 NOTE — PROGRESS NOTES
Injectable Influenza Immunization Documentation    1.  Is the person to be vaccinated sick today?   No    2. Does the person to be vaccinated have an allergy to a component   of the vaccine?   No    3. Has the person to be vaccinated ever had a serious reaction   to influenza vaccine in the past?   No    4. Has the person to be vaccinated ever had Guillain-Barré syndrome?   No    Form completed by Sylvia Yates [facility]

## 2017-09-23 ENCOUNTER — TELEPHONE (OUTPATIENT)
Dept: INTERNAL MEDICINE | Facility: CLINIC | Age: 77
End: 2017-09-23

## 2017-09-23 DIAGNOSIS — Z79.4 TYPE 2 DIABETES MELLITUS WITHOUT COMPLICATION, WITH LONG-TERM CURRENT USE OF INSULIN (H): Primary | ICD-10-CM

## 2017-09-23 DIAGNOSIS — E11.9 TYPE 2 DIABETES MELLITUS WITHOUT COMPLICATION, WITH LONG-TERM CURRENT USE OF INSULIN (H): Primary | ICD-10-CM

## 2017-09-23 RX ORDER — FLUTICASONE PROPIONATE 50 MCG
1-2 SPRAY, SUSPENSION (ML) NASAL DAILY
Qty: 48 G | Refills: 3 | Status: SHIPPED | OUTPATIENT
Start: 2017-09-23 | End: 2018-01-29

## 2017-09-23 NOTE — TELEPHONE ENCOUNTER
Patients insurance doesn't cover Humalog, Novolog is preferred. Please send new rx.  Thank you,  Tamara William, Pharm. D.  [facility]  [phone]

## 2017-09-26 NOTE — TELEPHONE ENCOUNTER
Pt not always using Mychart. INform him that insurance no longer covering Humalog insulin so, when next needs to refill his fast acting insulin, will be changed to Novolog insulin instead. Will use same dosing with Novolog that is using now with Humalog. Rx for Novolog put on file at [facility] pharmacy to fill in future when needed. Also let him know still working on getting appeal letter  Done for insurance re:  testosterone coverage

## 2017-09-26 NOTE — TELEPHONE ENCOUNTER
Pascual Yen's insurance no longer covers his Humalog and prefer Novolog.  Are you ok with changing to Novolog?  If so, can you send a new prescription to the [facility]?  I loaded the pharmacy for you.    Thank you,

## 2017-10-10 DIAGNOSIS — E11.9 TYPE 2 DIABETES MELLITUS WITHOUT COMPLICATION (H): ICD-10-CM

## 2017-10-11 NOTE — TELEPHONE ENCOUNTER
metFORMIN (GLUCOPHAGE) 1000 MG tablet         Last Written Prescription Date: 9/12/2017  Last Fill Quantity: 60, # refills: 0  Last Office Visit with [facility], [facility] or [facility] prescribing provider:  9/21/2017        BP Readings from Last 3 Encounters:   09/21/17 116/70   08/30/17 116/58   02/28/17 112/66     Lab Results   Component Value Date    MICROL 19 09/13/2017     Lab Results   Component Value Date    UMALCR 13.90 09/13/2017     Creatinine   Date Value Ref Range Status   09/13/2017 1.03 0.66 - 1.25 mg/dL Final   ]  GFR Estimate   Date Value Ref Range Status   09/13/2017 70 >60 mL/min/1.7m2 Final     Comment:     Non  GFR Calc   02/24/2017 73 >60 mL/min/1.7m2 Final     Comment:     Non  GFR Calc   08/15/2016 64 >60 mL/min/1.7m2 Final     Comment:     Non  GFR Calc     GFR Estimate If Black   Date Value Ref Range Status   09/13/2017 85 >60 mL/min/1.7m2 Final     Comment:      GFR Calc   02/24/2017 88 >60 mL/min/1.7m2 Final     Comment:      GFR Calc   08/15/2016 77 >60 mL/min/1.7m2 Final     Comment:      GFR Calc     Lab Results   Component Value Date    CHOL 104 09/13/2017     Lab Results   Component Value Date    HDL 40 09/13/2017     Lab Results   Component Value Date    LDL 31 09/13/2017     Lab Results   Component Value Date    TRIG 166 09/13/2017     Lab Results   Component Value Date    CHOLHDLRATIO 3.3 05/08/2015     Lab Results   Component Value Date    AST 11 09/13/2017     Lab Results   Component Value Date    ALT 16 09/13/2017     Lab Results   Component Value Date    A1C 6.4 09/13/2017    A1C 6.8 02/24/2017    A1C 7.0 08/15/2016    A1C 7.2 01/18/2016    A1C 6.8 05/08/2015     Potassium   Date Value Ref Range Status   09/13/2017 4.3 3.4 - 5.3 mmol/L Final

## 2017-10-18 ENCOUNTER — MYC MEDICAL ADVICE (OUTPATIENT)
Dept: INTERNAL MEDICINE | Facility: CLINIC | Age: 77
End: 2017-10-18

## 2017-10-23 DIAGNOSIS — D50.9 IRON DEFICIENCY ANEMIA, UNSPECIFIED IRON DEFICIENCY ANEMIA TYPE: ICD-10-CM

## 2017-10-23 LAB
ERYTHROCYTE [DISTWIDTH] IN BLOOD BY AUTOMATED COUNT: 13.7 % (ref 10–15)
FERRITIN SERPL-MCNC: 93 NG/ML (ref 26–388)
HCT VFR BLD AUTO: 35.6 % (ref 40–53)
HGB BLD-MCNC: 12 G/DL (ref 13.3–17.7)
IRON SATN MFR SERPL: 29 % (ref 15–46)
IRON SERPL-MCNC: 101 UG/DL (ref 35–180)
MCH RBC QN AUTO: 30.3 PG (ref 26.5–33)
MCHC RBC AUTO-ENTMCNC: 33.7 G/DL (ref 31.5–36.5)
MCV RBC AUTO: 90 FL (ref 78–100)
PLATELET # BLD AUTO: 181 10E9/L (ref 150–450)
RBC # BLD AUTO: 3.96 10E12/L (ref 4.4–5.9)
TIBC SERPL-MCNC: 348 UG/DL (ref 240–430)
WBC # BLD AUTO: 4 10E9/L (ref 4–11)

## 2017-10-23 PROCEDURE — 82728 ASSAY OF FERRITIN: CPT | Performed by: INTERNAL MEDICINE

## 2017-10-23 PROCEDURE — 83550 IRON BINDING TEST: CPT | Performed by: INTERNAL MEDICINE

## 2017-10-23 PROCEDURE — 36415 COLL VENOUS BLD VENIPUNCTURE: CPT | Performed by: INTERNAL MEDICINE

## 2017-10-23 PROCEDURE — 85027 COMPLETE CBC AUTOMATED: CPT | Performed by: INTERNAL MEDICINE

## 2017-10-23 PROCEDURE — 83540 ASSAY OF IRON: CPT | Performed by: INTERNAL MEDICINE

## 2017-11-13 ENCOUNTER — MYC MEDICAL ADVICE (OUTPATIENT)
Dept: INTERNAL MEDICINE | Facility: CLINIC | Age: 77
End: 2017-11-13

## 2017-11-13 DIAGNOSIS — E34.9 TESTOSTERONE DEFICIENCY: Primary | ICD-10-CM

## 2017-11-13 NOTE — LETTER
[facility]  [address] [address]  [phone]      2017     Regarding   Pascual Perea  [address]  [place] [address]    42    To [employer]  Prior Auth Appeals Dept  Fax:   [phone]      To Whom It May Concern,  I am writing this letter in regards to my patient,  Pascual Perea,  and a request to have testosterone replacement covered either through Depotestosterone injections or AndroGel topical therapy. 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  MRI of the brain/pituitary has been normal. This had previously been treated with testosterone replacement with good response regarding muscle strength and energy. Earlier this year, he was informed that his testosterone replacement via depotestosterone was no longer covered by insurance and he was not able to afford to pay for  the testosterone replacement out of pocket and  testosterone levels have dropped to critically low levels as shown below. With that testosterone deficiency,he has developed increasing fatigue along with muscle atrophy and weakness. Therefore I am requesting his insurance cover this medically necessary testosterone replacement. I would appreciate response to this request as soon as possible. If you have further questions regarding this matter, please feel free to contact me at [phone]    Component      Latest Ref Rng & Units 2017   Testosterone Total      240 - 950 ng/dL 8 (L)    Sex Hormone Binding Globulin      11 - 80 nmol/L 10 (L)    Free Testosterone Calculated      4.7 - 24.4 ng/dL 0.24 (L)    Lutropin      3.1 - 34.6 IU/L  3.7   FSH      0.7 - 10.8 IU/L  8.6   Sincerely,    Andrew Elizalde MD  Internal Medicine

## 2017-11-30 DIAGNOSIS — E11.9 TYPE 2 DIABETES MELLITUS WITHOUT COMPLICATION, WITH LONG-TERM CURRENT USE OF INSULIN (H): ICD-10-CM

## 2017-11-30 DIAGNOSIS — Z79.4 TYPE 2 DIABETES MELLITUS WITHOUT COMPLICATION, WITH LONG-TERM CURRENT USE OF INSULIN (H): ICD-10-CM

## 2017-11-30 RX ORDER — INSULIN ASPART 100 [IU]/ML
INJECTION, SOLUTION INTRAVENOUS; SUBCUTANEOUS
Qty: 30 ML | Refills: 0 | OUTPATIENT
Start: 2017-11-30

## 2017-12-01 DIAGNOSIS — E34.9 TESTOSTERONE DEFICIENCY: ICD-10-CM

## 2017-12-01 LAB
FSH SERPL-ACNC: 8.6 IU/L (ref 0.7–10.8)
LH SERPL-ACNC: 3.7 IU/L (ref 3.1–34.6)
PROLACTIN SERPL-MCNC: 4 UG/L (ref 2–18)

## 2017-12-01 PROCEDURE — 84146 ASSAY OF PROLACTIN: CPT | Performed by: INTERNAL MEDICINE

## 2017-12-01 PROCEDURE — 36415 COLL VENOUS BLD VENIPUNCTURE: CPT | Performed by: INTERNAL MEDICINE

## 2017-12-01 PROCEDURE — 83001 ASSAY OF GONADOTROPIN (FSH): CPT | Performed by: INTERNAL MEDICINE

## 2017-12-01 PROCEDURE — 83002 ASSAY OF GONADOTROPIN (LH): CPT | Performed by: INTERNAL MEDICINE

## 2017-12-20 ENCOUNTER — TELEPHONE (OUTPATIENT)
Dept: INTERNAL MEDICINE | Facility: CLINIC | Age: 77
End: 2017-12-20

## 2017-12-20 NOTE — TELEPHONE ENCOUNTER
Prior authorization    Medication name testosterone  Insurance [employer]  Insurance ID number   Prior authorization faxed through cover my meds

## 2017-12-20 NOTE — TELEPHONE ENCOUNTER
Had spoken with pt in person since message when he was here with wife for appt. Letter has been done and has been faxed to [employer] by KESHAV Kurtz. Awiting answer back from [employer]

## 2017-12-28 ENCOUNTER — MYC MEDICAL ADVICE (OUTPATIENT)
Dept: INTERNAL MEDICINE | Facility: CLINIC | Age: 77
End: 2017-12-28

## 2017-12-28 DIAGNOSIS — E34.9 TESTOSTERONE DEFICIENCY: Primary | ICD-10-CM

## 2017-12-28 RX ORDER — TESTOSTERONE CYPIONATE 200 MG/ML
300 INJECTION, SOLUTION INTRAMUSCULAR
Qty: 10 VIAL | Refills: 1 | Status: SHIPPED | OUTPATIENT
Start: 2017-12-28 | End: 2018-06-19

## 2018-01-29 ENCOUNTER — OFFICE VISIT (OUTPATIENT)
Dept: INTERNAL MEDICINE | Facility: CLINIC | Age: 78
End: 2018-01-29
Payer: COMMERCIAL

## 2018-01-29 VITALS
DIASTOLIC BLOOD PRESSURE: 72 MMHG | HEART RATE: 72 BPM | SYSTOLIC BLOOD PRESSURE: 114 MMHG | TEMPERATURE: 97.8 F | RESPIRATION RATE: 16 BRPM | OXYGEN SATURATION: 99 % | BODY MASS INDEX: 28.98 KG/M2 | HEIGHT: 71 IN | WEIGHT: 207 LBS

## 2018-01-29 DIAGNOSIS — J30.9 CHRONIC ALLERGIC RHINITIS, UNSPECIFIED SEASONALITY, UNSPECIFIED TRIGGER: ICD-10-CM

## 2018-01-29 DIAGNOSIS — Z79.4 TYPE 2 DIABETES MELLITUS WITHOUT COMPLICATION, WITH LONG-TERM CURRENT USE OF INSULIN (H): Primary | ICD-10-CM

## 2018-01-29 DIAGNOSIS — I48.3 TYPICAL ATRIAL FLUTTER (H): ICD-10-CM

## 2018-01-29 DIAGNOSIS — K21.9 GASTROESOPHAGEAL REFLUX DISEASE WITHOUT ESOPHAGITIS: ICD-10-CM

## 2018-01-29 DIAGNOSIS — E11.9 TYPE 2 DIABETES MELLITUS WITHOUT COMPLICATION, WITH LONG-TERM CURRENT USE OF INSULIN (H): Primary | ICD-10-CM

## 2018-01-29 DIAGNOSIS — E34.9 TESTOSTERONE DEFICIENCY: ICD-10-CM

## 2018-01-29 PROCEDURE — 99213 OFFICE O/P EST LOW 20 MIN: CPT | Performed by: INTERNAL MEDICINE

## 2018-01-29 NOTE — MR AVS SNAPSHOT
After Visit Summary   1/29/2018    Pascual Perea    MRN: [id number]           Patient Information     Date Of Birth          1940        Visit Information        Provider Department      1/29/2018 10:00 AM Andrew Elizalde MD [facility]        Today's Diagnoses     Type 2 diabetes mellitus without complication, with long-term current use of insulin (H)    -  1    Testosterone deficiency        Typical atrial flutter (H)           Follow-ups after your visit        Your next 10 appointments already scheduled     Apr 23, 2018  4:15 PM CDT   [facility] EP RETURN with Satnam Kasper MD   [facility] ([facility])    [address] [place] [id number] [phone]              Who to contact     If you have questions or need follow up information about today's clinic visit or your schedule please contact [facility] directly at [phone].  Normal or non-critical lab and imaging results will be communicated to you by Shizzlrhart, letter or phone within 4 business days after the clinic has received the results. If you do not hear from us within 7 days, please contact the clinic through Shizzlrhart or phone. If you have a critical or abnormal lab result, we will notify you by phone as soon as possible.  Submit refill requests through Isoflux or call your pharmacy and they will forward the refill request to us. Please allow 3 business days for your refill to be completed.          Additional Information About Your Visit        MyChart Information     Isoflux gives you secure access to your electronic health record. If you see a primary care provider, you can also send messages to your care team and make appointments. If you have questions, please call your primary care clinic.  If you do not have a primary care provider, please call [phone] and they will assist you.        Care EveryWhere ID      "This is your Care EveryWhere ID. This could be used by other organizations to access your [facility] medical records  [id number]        Your Vitals Were     Pulse Temperature Respirations Height Pulse Oximetry BMI (Body Mass Index)    72 97.8  F (36.6  C) (Oral) 16 5' 11\" (1.803 m) 99% 28.87 kg/m2       Blood Pressure from Last 3 Encounters:   01/29/18 114/72   09/21/17 116/70   08/30/17 116/58    Weight from Last 3 Encounters:   01/29/18 207 lb (93.9 kg)   09/21/17 199 lb (90.3 kg)   08/30/17 202 lb 6.4 oz (91.8 kg)              Today, you had the following     No orders found for display         Today's Medication Changes          These changes are accurate as of 1/29/18 11:54 AM.  If you have any questions, ask your nurse or doctor.               Start taking these medicines.        Dose/Directions    insulin detemir 100 UNIT/ML injection   Commonly known as:  LEVEMIR FLEXPEN/FLEXTOUCH   Used for:  Type 2 diabetes mellitus without complication, with long-term current use of insulin (H)   Started by:  Andrew Elizalde MD        Inject 40-45 Units Subcutaneous At Bedtime   Quantity:  45 mL   Refills:  3         Stop taking these medicines if you haven't already. Please contact your care team if you have questions.     insulin glargine 100 UNIT/ML injection   Commonly known as:  LANTUS SOLOSTAR   Stopped by:  Andrew Elizalde MD                Where to get your medicines      These medications were sent to [facility] [address] [address]     Phone:  [phone]     insulin detemir 100 UNIT/ML injection                Primary Care Provider Office Phone # Fax #    Andrew Elizalde [phone]       [address] [place] [address]        Equal Access to Services     [facility] : Odette Jensen, waaxda luqadaha, qaybta kaalmaparviz mclean, suhail cardenas. So [facility] [phone].    ATENCIÓN: Si " bre ma, tiene a cooley disposición servicios gratuitos de asistencia lingüística. Iftikhar koehler [phone].    We comply with applicable federal civil rights laws and [place] laws. We do not discriminate on the basis of race, color, national origin, age, disability, sex, sexual orientation, or gender identity.            Thank you!     Thank you for choosing [facility]  for your care. Our goal is always to provide you with excellent care. Hearing back from our patients is one way we can continue to improve our services. Please take a few minutes to complete the written survey that you may receive in the mail after your visit with us. Thank you!             Your Updated Medication List - Protect others around you: Learn how to safely use, store and throw away your medicines at www.disposemymeds.org.          This list is accurate as of 1/29/18 11:54 AM.  Always use your most recent med list.                   Brand Name Dispense Instructions for use Diagnosis    apixaban ANTICOAGULANT 5 MG tablet    ELIQUIS    60 tablet    Take 1 tablet (5 mg) by mouth 2 times daily    Typical atrial flutter (H)       aspirin 81 MG tablet      Take by mouth At Bedtime        blood glucose monitoring test strip    ONETOUCH ULTRA    200 each    Test twice daily with One Touch Ultra Blue    Type 2 diabetes mellitus without complication, with long-term current use of insulin (H)       cyanocobalamin 1000 MCG tablet    vitamin  B-12     Take 5,000 mcg by mouth daily.        ferrous fumarate 65 mg ([place]. FE)-Vitamin C 125 mg  MG Tabs tablet    VITRON-C    60 tablet    1 tab twice a day for anemia    Iron deficiency anemia, unspecified iron deficiency anemia type       fluticasone 50 MCG/ACT spray    FLONASE    48 g    Spray 1-2 sprays into both nostrils daily    Chronic allergic rhinitis, unspecified seasonality, unspecified trigger       gemfibrozil 600 MG tablet    LOPID    180 tablet    TAKE ONE TABLET  BY MOUTH TWICE DAILY    Hyperlipidemia LDL goal <70       glucose 4 G Chew chewable tablet     30    as directed    Type II or unspecified type diabetes mellitus without mention of complication, not stated as uncontrolled       insulin aspart 100 UNIT/ML injection    NovoLOG FLEXPEN    30 mL    Inject 3 times daily (before meals). 2 units per carb choice (approx 5-10 units/meal)    Type 2 diabetes mellitus without complication, with long-term current use of insulin (H)       insulin detemir 100 UNIT/ML injection    LEVEMIR FLEXPEN/FLEXTOUCH    45 mL    Inject 40-45 Units Subcutaneous At Bedtime    Type 2 diabetes mellitus without complication, with long-term current use of insulin (H)       insulin pen needle 30G X 8 MM    NOVOFINE 30    400 each    1 Syringe 4 times daily    Type 2 diabetes mellitus without complication, with long-term current use of insulin (H)       levothyroxine 125 MCG tablet    SYNTHROID/LEVOTHROID    90 tablet    TAKE ONE TABLET BY MOUTH EVERY DAY    Hypothyroidism, unspecified type       lisinopril 20 MG tablet    PRINIVIL/ZESTRIL    90 tablet    TAKE ONE TABLET BY MOUTH EVERY DAY    Essential hypertension, benign       loratadine 10 MG tablet    CLARITIN    90 tablet    TAKE ONE TABLET BY MOUTH EVERY DAY    Allergic rhinitis       metFORMIN 1000 MG tablet    GLUCOPHAGE    60 tablet    Take 1 tablet (1,000 mg) by mouth 2 times daily (with meals)    Type 2 diabetes mellitus without complication (H)       metoprolol tartrate 50 MG tablet    LOPRESSOR    180 tablet    TAKE ONE TABLET BY MOUTH TWICE A DAY    Essential hypertension, benign       nitroGLYcerin 0.4 MG sublingual tablet    NITROSTAT    25 tablet    Place 1 tablet (0.4 mg) under the tongue every 5 minutes as needed for chest pain    Unstable angina (H)       ONETOUCH DELICA LANCETS 33G Misc     300 each    1 Device 3 times daily . Use to test blood sugars 3 times daily or as directed.    Type 2 diabetes, HbA1C goal < 8% (H)        "order for DME     30 Device    Equipment being ordered: 3ml syringes and 22G 1 1/2\" needles to use with testosterone inj every 2 weeks    Testosterone deficiency       pravastatin 80 MG tablet    PRAVACHOL    90 tablet    TAKE ONE TABLET BY MOUTH EVERY NIGHT AT BEDTIME    Hyperlipidemia LDL goal <70       ranitidine 150 MG tablet    ZANTAC    180 tablet    TAKE ONE TABLET BY MOUTH TWICE A DAY    Gastroesophageal reflux disease without esophagitis       testosterone cypionate 200 MG/ML injection    DEPOTESTOTERONE    10 vial    Inject 1.5 mLs (300 mg) into the muscle every 14 days    Testosterone deficiency       VITAMIN D3 PO      Take 5,000 Units by mouth daily          "

## 2018-01-29 NOTE — PROGRESS NOTES
SUBJECTIVE:   Pascual Perea is a 77 year old male who presents to clinic today for the following health issues:  Chief Complaint   Patient presents with     Diabetes         Diabetes Follow-up      Patient is checking blood sugars: rarely.  Results range from 130's to 150's    Diabetic concerns: None and other - Changing Insulin      Symptoms of hypoglycemia (low blood sugar): none     Paresthesias (numbness or burning in feet) or sores: Yes tingling with numbness     Date of last diabetic eye exam: 12/2017    BP Readings from Last 2 Encounters:   01/29/18 114/72   09/21/17 116/70     Hemoglobin A1C (%)   Date Value   09/13/2017 6.4 (H)   02/24/2017 6.8 (H)     LDL Cholesterol Calculated (mg/dL)   Date Value   09/13/2017 31   08/30/2017 66       Amount of exercise or physical activity: None    Problems taking medications regularly: No    Medication side effects: none    Diet: regular (no restrictions)    Pt's past medical history, family history, habits, medications and allergies were reviewed with the patient today.  See snap shot for  HCM status. Most recent lab results reviewed with pt. Problem list and histories reviewed & adjusted, as indicated.  Additional history as below:      Lab Results   Component Value Date    GLC 95 09/13/2017     Lab Results   Component Value Date    A1C 6.4 09/13/2017     Lab Results   Component Value Date    CHOL 104 09/13/2017     Lab Results   Component Value Date    LDL 31 09/13/2017     Lab Results   Component Value Date    HDL 40 09/13/2017     Lab Results   Component Value Date    TRIG 166 09/13/2017     Lab Results   Component Value Date    CR 1.03 09/13/2017     Lab Results   Component Value Date    ALT 16 09/13/2017     Lab Results   Component Value Date    AST 11 09/13/2017     Lab Results   Component Value Date    MICROL 19 09/13/2017     Lab Results   Component Value Date    TSH 0.73 02/21/2017       Denies CP, SOB, abdominal pain, polyuria, polydipsia, vision  "changes, extremity numbness/parasthesias or skin problems.  Patient has gained 8 pounds since starting testosterone replacement.  Energy is significantly better.  Will be due for repeat testosterone lab monitoring in mid February.  Patient currently on Lantus insulin but no longer covered by insurance.  Review of his formulary book today shows options of Basaglar or Levemir with both at Tier 3  Additional ROS:   Constitutional, HEENT, Cardiovascular, Pulmonary, GI and , Neuro, MSK and Psych review of systems/symptoms are otherwise negative or unchanged from previous, except as noted above.      OBJECTIVE:  /72  Pulse 72  Temp 97.8  F (36.6  C) (Oral)  Resp 16  Ht 5' 11\" (1.803 m)  Wt 207 lb (93.9 kg)  SpO2 99%  BMI 28.87 kg/m2   Estimated body mass index is 28.87 kg/(m^2) as calculated from the following:    Height as of this encounter: 5' 11\" (1.803 m).    Weight as of this encounter: 207 lb (93.9 kg).  Eye: PERRL, EOMI  HENT: ear canals and TM's normal and nose and mouth without ulcers or lesions   Neck: no adenopathy. Thyroid normal to palpation. No bruits  Pulm: Lungs clear to auscultation   CV: Regular rates and rhythm  GI: Soft, nontender, Normal active bowel sounds, No hepatosplenomegaly or masses palpable  Ext: Peripheral pulses intact. No edema.  Neuro: Normal strength and tone, sensory exam grossly normal    Assessment/Plan: (See plan discussion below for further details)  1. Type 2 diabetes mellitus without complication, with long-term current use of insulin (H)  Change Lantus to Levemir with same current dosing. A1C lab mid February  - insulin detemir (LEVEMIR FLEXPEN/FLEXTOUCH) 100 UNIT/ML injection; Inject 40-45 Units Subcutaneous At Bedtime  Dispense: 45 mL; Refill: 3    2. Testosterone deficiency  Continue current medication.  Repeat nonfasting labs mid February. Please time the lab appointment date so that it is the same day as  when you  have  your testosterone injection and have the " labs done downstairs BEFORE receiving the testosterone injection at home by pt's wife    3. Typical atrial flutter (H)  Patient currently asymptomatic.  Reviewed formulary book today and Eliquis is preferred medication. Will continue current dosing        Andrew Elizalde MD  Internal Medicine Department  [facility]

## 2018-01-31 RX ORDER — FLUTICASONE PROPIONATE 50 MCG
SPRAY, SUSPENSION (ML) NASAL
Qty: 48 G | Refills: 3 | Status: SHIPPED | OUTPATIENT
Start: 2018-01-31 | End: 2019-09-25

## 2018-01-31 NOTE — TELEPHONE ENCOUNTER
"Requested Prescriptions   Pending Prescriptions Disp Refills     fluticasone (FLONASE) 50 MCG/ACT spray [Pharmacy Med Name: FLUTICASONE 50MCG NASAL SPRAY]  Last Written Prescription Date:  9/23/2017  Last Fill Quantity: 48 g,  # refills: 3   Last Office Visit with [facility] provider:  1/29/2018   Future Office Visit:      48 g 0     Sig: USE ONE TO TWO SPRAYS IN EACH NOSTRIL EVERY DAY    Inhaled Steroids Protocol Passed    1/29/2018  5:55 PM       Passed - Patient is age 12 or older       Passed - Recent or future visit with authorizing provider's specialty    Patient had office visit in the last year or has a visit in the next 30 days with authorizing provider.  See \"Patient Info\" tab in inbasket, or \"Choose Columns\" in Meds & Orders section of the refill encounter.             ranitidine (ZANTAC) 150 MG tablet [Pharmacy Med Name: RANITIDINE HCL 150MG TABS]  .  Last Written Prescription Date:  8/01/2017  Last Fill Quantity: 180,  # refills: 1   Last Office Visit with [facility] provider:  1/29/2018   Future Office Visit:      180 tablet 1     Sig: TAKE ONE TABLET BY MOUTH TWICE A DAY    H2 Blockers Protocol Passed    1/29/2018  5:55 PM       Passed - Patient is age 12 or older       Passed - Recent or future visit with authorizing provider's specialty    Patient had office visit in the last year or has a visit in the next 30 days with authorizing provider.  See \"Patient Info\" tab in inbasket, or \"Choose Columns\" in Meds & Orders section of the refill encounter.               "

## 2018-02-07 ENCOUNTER — DOCUMENTATION ONLY (OUTPATIENT)
Dept: INTERNAL MEDICINE | Facility: CLINIC | Age: 78
End: 2018-02-07

## 2018-02-07 DIAGNOSIS — Z79.4 TYPE 2 DIABETES MELLITUS WITHOUT COMPLICATION, WITH LONG-TERM CURRENT USE OF INSULIN (H): Primary | ICD-10-CM

## 2018-02-07 DIAGNOSIS — E11.9 TYPE 2 DIABETES MELLITUS WITHOUT COMPLICATION, WITH LONG-TERM CURRENT USE OF INSULIN (H): Primary | ICD-10-CM

## 2018-02-12 DIAGNOSIS — Z79.4 TYPE 2 DIABETES MELLITUS WITHOUT COMPLICATION, WITH LONG-TERM CURRENT USE OF INSULIN (H): ICD-10-CM

## 2018-02-12 DIAGNOSIS — E34.9 TESTOSTERONE DEFICIENCY: ICD-10-CM

## 2018-02-12 DIAGNOSIS — E11.9 TYPE 2 DIABETES MELLITUS WITHOUT COMPLICATION, WITH LONG-TERM CURRENT USE OF INSULIN (H): ICD-10-CM

## 2018-02-12 LAB
ALBUMIN SERPL-MCNC: 3.9 G/DL (ref 3.4–5)
ALP SERPL-CCNC: 77 U/L (ref 40–150)
ALT SERPL W P-5'-P-CCNC: 14 U/L (ref 0–70)
ANION GAP SERPL CALCULATED.3IONS-SCNC: 4 MMOL/L (ref 3–14)
AST SERPL W P-5'-P-CCNC: 16 U/L (ref 0–45)
BILIRUB SERPL-MCNC: 1 MG/DL (ref 0.2–1.3)
BUN SERPL-MCNC: 11 MG/DL (ref 7–30)
CALCIUM SERPL-MCNC: 9.8 MG/DL (ref 8.5–10.1)
CHLORIDE SERPL-SCNC: 107 MMOL/L (ref 94–109)
CO2 SERPL-SCNC: 30 MMOL/L (ref 20–32)
CREAT SERPL-MCNC: 0.85 MG/DL (ref 0.66–1.25)
ERYTHROCYTE [DISTWIDTH] IN BLOOD BY AUTOMATED COUNT: 15.5 % (ref 10–15)
GFR SERPL CREATININE-BSD FRML MDRD: 88 ML/MIN/1.7M2
GLUCOSE SERPL-MCNC: 177 MG/DL (ref 70–99)
HBA1C MFR BLD: 6.5 % (ref 4.3–6)
HCT VFR BLD AUTO: 40.1 % (ref 40–53)
HGB BLD-MCNC: 12.6 G/DL (ref 13.3–17.7)
MCH RBC QN AUTO: 30.1 PG (ref 26.5–33)
MCHC RBC AUTO-ENTMCNC: 31.4 G/DL (ref 31.5–36.5)
MCV RBC AUTO: 96 FL (ref 78–100)
PLATELET # BLD AUTO: 158 10E9/L (ref 150–450)
POTASSIUM SERPL-SCNC: 4.5 MMOL/L (ref 3.4–5.3)
PROT SERPL-MCNC: 6.8 G/DL (ref 6.8–8.8)
RBC # BLD AUTO: 4.19 10E12/L (ref 4.4–5.9)
SODIUM SERPL-SCNC: 141 MMOL/L (ref 133–144)
TSH SERPL DL<=0.005 MIU/L-ACNC: 0.41 MU/L (ref 0.4–4)
WBC # BLD AUTO: 4.2 10E9/L (ref 4–11)

## 2018-02-12 PROCEDURE — 83036 HEMOGLOBIN GLYCOSYLATED A1C: CPT | Performed by: INTERNAL MEDICINE

## 2018-02-12 PROCEDURE — 84403 ASSAY OF TOTAL TESTOSTERONE: CPT | Performed by: INTERNAL MEDICINE

## 2018-02-12 PROCEDURE — 84443 ASSAY THYROID STIM HORMONE: CPT | Performed by: INTERNAL MEDICINE

## 2018-02-12 PROCEDURE — 80053 COMPREHEN METABOLIC PANEL: CPT | Performed by: INTERNAL MEDICINE

## 2018-02-12 PROCEDURE — 85027 COMPLETE CBC AUTOMATED: CPT | Performed by: INTERNAL MEDICINE

## 2018-02-12 PROCEDURE — 84270 ASSAY OF SEX HORMONE GLOBUL: CPT | Performed by: INTERNAL MEDICINE

## 2018-02-12 PROCEDURE — 36415 COLL VENOUS BLD VENIPUNCTURE: CPT | Performed by: INTERNAL MEDICINE

## 2018-02-14 LAB
SHBG SERPL-SCNC: 8 NMOL/L (ref 11–80)
TESTOST FREE SERPL-MCNC: 14.05 NG/DL (ref 4.7–24.4)
TESTOST SERPL-MCNC: 405 NG/DL (ref 240–950)

## 2018-03-05 DIAGNOSIS — E78.5 HYPERLIPIDEMIA LDL GOAL <70: ICD-10-CM

## 2018-03-05 DIAGNOSIS — E03.9 HYPOTHYROIDISM, UNSPECIFIED TYPE: ICD-10-CM

## 2018-03-06 NOTE — TELEPHONE ENCOUNTER
"Requested Prescriptions   Pending Prescriptions Disp Refills     levothyroxine (SYNTHROID/LEVOTHROID) 125 MCG tablet [Pharmacy Med Name: LEVOTHYROXINE SODIUM 125MCG TABS]  Last Written Prescription Date:  8/30/2017  Last Fill Quantity: 90,  # refills: 1   Last office visit: 1/29/2018 with prescribing provider:  1/29/2018   Future Office Visit:     90 tablet 1     Sig: TAKE ONE TABLET BY MOUTH EVERY DAY    Thyroid Protocol Passed    3/5/2018 10:15 PM       Passed - Patient is 12 years or older       Passed - Recent (12 mo) or future (30 days) visit within the authorizing provider's specialty    Patient had office visit in the last year or has a visit in the next 30 days with authorizing provider.  See \"Patient Info\" tab in inbasket, or \"Choose Columns\" in Meds & Orders section of the refill encounter.            Passed - Normal TSH on file in past 12 months    Recent Labs   Lab Test  02/12/18   0829   TSH  0.41              gemfibrozil (LOPID) 600 MG tablet [Pharmacy Med Name: GEMFIBROZIL 600MG TABS]  Last Written Prescription Date:  6/06/2017  Last Fill Quantity: 180,  # refills: 3   Last office visit: 1/29/2018 with prescribing provider:  1/29/2018   Future Office Visit:     180 tablet 2     Sig: TAKE ONE TABLET BY MOUTH TWICE A DAY    Fibrates Passed    3/5/2018 10:15 PM       Passed - Lipid panel on file in past 12 months    Recent Labs   Lab Test  09/13/17   0840   05/08/15   0911   CHOL  104   < >  124   TRIG  166*   < >  110   HDL  40   < >  38*   LDL  31   < >  64   NHDL  64   < >   --    VLDL   --    --   22   CHOLHDLRATIO   --    --   3.3    < > = values in this interval not displayed.              Passed - No abnormal creatine kinase in past 12 months    No lab results found.         Passed - Recent (12 mo) or future (30 days) visit within the authorizing provider's specialty    Patient had office visit in the last year or has a visit in the next 30 days with authorizing provider.  See \"Patient Info\" tab " "in inbasket, or \"Choose Columns\" in Meds & Orders section of the refill encounter.            Passed - Patient is age 18 or older        pravastatin (PRAVACHOL) 80 MG tablet [Pharmacy Med Name: PRAVASTATIN SODIUM 80MG TABS]  Last Written Prescription Date:  9/06/2017  Last Fill Quantity: 90,  # refills: 1   Last office visit: 1/29/2018 with prescribing provider:  1/29/2018   Future Office Visit:     90 tablet 1     Sig: TAKE ONE TABLET BY MOUTH EVERY NIGHT AT BEDTIME    Statins Protocol Passed    3/5/2018 10:15 PM       Passed - LDL on file in past 12 months    Recent Labs   Lab Test  09/13/17   0840   LDL  31            Passed - No abnormal creatine kinase in past 12 months    No lab results found.         Passed - Recent (12 mo) or future (30 days) visit within the authorizing provider's specialty    Patient had office visit in the last year or has a visit in the next 30 days with authorizing provider.  See \"Patient Info\" tab in inbasket, or \"Choose Columns\" in Meds & Orders section of the refill encounter.            Passed - Patient is age 18 or older          "

## 2018-03-07 RX ORDER — LEVOTHYROXINE SODIUM 125 UG/1
TABLET ORAL
Qty: 90 TABLET | Refills: 3 | Status: SHIPPED | OUTPATIENT
Start: 2018-03-07 | End: 2019-03-26

## 2018-03-07 RX ORDER — PRAVASTATIN SODIUM 80 MG/1
TABLET ORAL
Qty: 90 TABLET | Refills: 1 | Status: SHIPPED | OUTPATIENT
Start: 2018-03-07 | End: 2018-09-10

## 2018-03-07 RX ORDER — GEMFIBROZIL 600 MG/1
TABLET, FILM COATED ORAL
Qty: 180 TABLET | Refills: 1 | Status: SHIPPED | OUTPATIENT
Start: 2018-03-07 | End: 2018-09-03

## 2018-03-12 DIAGNOSIS — E11.9 TYPE 2 DIABETES MELLITUS WITHOUT COMPLICATION (H): ICD-10-CM

## 2018-03-12 NOTE — TELEPHONE ENCOUNTER
"Requested Prescriptions   Pending Prescriptions Disp Refills     metFORMIN (GLUCOPHAGE) 1000 MG tablet [Pharmacy Med Name: METFORMIN HCL 1000MG TABS]  Last Written Prescription Date:  10/11/2017  Last Fill Quantity: 60,  # refills: 4   Last office visit: 1/29/2018 with prescribing provider:  1/29/2018   Future Office Visit:     60 tablet 4     Sig: TAKE ONE TABLET BY MOUTH TWO TIMES A DAY WITH MEALS    Biguanide Agents Passed    3/12/2018 12:09 PM       Passed - Blood pressure less than 140/90 in past 6 months    BP Readings from Last 3 Encounters:   01/29/18 114/72   09/21/17 116/70   08/30/17 116/58                Passed - Patient has documented LDL within the past 12 mos.    Recent Labs   Lab Test  09/13/17   0840   LDL  31            Passed - Patient has had a Microalbumin in the past 12 mos.    Recent Labs   Lab Test  09/13/17   0919   MICROL  19   UMALCR  13.90            Passed - Patient is age 10 or older       Passed - Patient has documented A1c within the specified period of time.    Recent Labs   Lab Test  02/12/18   0829   A1C  6.5*            Passed - Patient's CR is NOT>1.4 OR Patient's EGFR is NOT<45 within past 12 mos.    Recent Labs   Lab Test  02/12/18   0829   GFRESTIMATED  88   GFRESTBLACK  >90       Recent Labs   Lab Test  02/12/18   0829   CR  0.85            Passed - Patient does NOT have a diagnosis of CHF.       Passed - Recent (6 mo) or future (30 days) visit within the authorizing provider's specialty    Patient had office visit in the last 6 months or has a visit in the next 30 days with authorizing provider or within the authorizing provider's specialty.  See \"Patient Info\" tab in inbasket, or \"Choose Columns\" in Meds & Orders section of the refill encounter.            "

## 2018-03-26 DIAGNOSIS — I10 ESSENTIAL HYPERTENSION, BENIGN: ICD-10-CM

## 2018-03-27 RX ORDER — LISINOPRIL 20 MG/1
TABLET ORAL
Qty: 90 TABLET | Refills: 3 | Status: SHIPPED | OUTPATIENT
Start: 2018-03-27 | End: 2018-09-13 | Stop reason: DRUGHIGH

## 2018-03-27 RX ORDER — METOPROLOL TARTRATE 50 MG
TABLET ORAL
Qty: 180 TABLET | Refills: 3 | Status: SHIPPED | OUTPATIENT
Start: 2018-03-27 | End: 2018-09-13

## 2018-03-27 NOTE — TELEPHONE ENCOUNTER
"Requested Prescriptions   Pending Prescriptions Disp Refills     lisinopril (PRINIVIL/ZESTRIL) 20 MG tablet [Pharmacy Med Name: LISINOPRIL 20MG TABS]  Last Written Prescription Date:  09/19/2017  Last Fill Quantity: 90,  # refills: 1   Last office visit: 1/29/2018 with prescribing provider:     Future Office Visit:     90 tablet 1     Sig: TAKE ONE TABLET BY MOUTH EVERY DAY    ACE Inhibitors (Including Combos) Protocol Passed    3/26/2018 11:59 AM       Passed - Blood pressure under 140/90 in past 12 months    BP Readings from Last 3 Encounters:   01/29/18 114/72   09/21/17 116/70   08/30/17 116/58                Passed - Recent (12 mo) or future (30 days) visit within the authorizing provider's specialty    Patient had office visit in the last 12 months or has a visit in the next 30 days with authorizing provider or within the authorizing provider's specialty.  See \"Patient Info\" tab in inbasket, or \"Choose Columns\" in Meds & Orders section of the refill encounter.           Passed - Patient is age 18 or older       Passed - Normal serum creatinine on file in past 12 months    Recent Labs   Lab Test  02/12/18   0829   CR  0.85            Passed - Normal serum potassium on file in past 12 months    Recent Labs   Lab Test  02/12/18   0829   POTASSIUM  4.5             metoprolol tartrate (LOPRESSOR) 50 MG tablet [Pharmacy Med Name: METOPROLOL TARTRATE 50MG TABS]  Last Written Prescription Date:  06/27/2017  Last Fill Quantity: 180,  # refills: 2   Last office visit: 1/29/2018 with prescribing provider:     Future Office Visit:     180 tablet 2     Sig: TAKE ONE TABLET BY MOUTH TWICE A DAY    Beta-Blockers Protocol Passed    3/26/2018 11:59 AM       Passed - Blood pressure under 140/90 in past 12 months    BP Readings from Last 3 Encounters:   01/29/18 114/72   09/21/17 116/70   08/30/17 116/58                Passed - Patient is age 6 or older       Passed - Recent (12 mo) or future (30 days) visit within the " "authorizing provider's specialty    Patient had office visit in the last 12 months or has a visit in the next 30 days with authorizing provider or within the authorizing provider's specialty.  See \"Patient Info\" tab in inbasket, or \"Choose Columns\" in Meds & Orders section of the refill encounter.              "

## 2018-04-09 DIAGNOSIS — J30.9 CHRONIC ALLERGIC RHINITIS, UNSPECIFIED SEASONALITY, UNSPECIFIED TRIGGER: Primary | ICD-10-CM

## 2018-04-10 RX ORDER — LORATADINE 10 MG/1
TABLET ORAL
Qty: 90 TABLET | Refills: 3 | Status: SHIPPED | OUTPATIENT
Start: 2018-04-10 | End: 2019-02-01 | Stop reason: ALTCHOICE

## 2018-04-10 NOTE — TELEPHONE ENCOUNTER
"Requested Prescriptions   Pending Prescriptions Disp Refills     loratadine (CLARITIN) 10 MG tablet [Pharmacy Med Name: LORATADINE 10MG TABS] 90 tablet 2     Sig: TAKE ONE TABLET BY MOUTH EVERY DAY    Antihistamines Protocol Failed    4/9/2018 10:39 AM       Failed - Patient is 3-64 years of age    Apply weight-based dosing for peds patients age 3 - 12 years of age.    Forward request to provider for patients under the age of 3 or over the age of 64.         Passed - Recent (12 mo) or future (30 days) visit within the authorizing provider's specialty    Patient had office visit in the last 12 months or has a visit in the next 30 days with authorizing provider or within the authorizing provider's specialty.  See \"Patient Info\" tab in inbasket, or \"Choose Columns\" in Meds & Orders section of the refill encounter.            Routing refill request to provider for review/approval because:  Fails age protocol        "

## 2018-04-16 ENCOUNTER — HOSPITAL ENCOUNTER (OUTPATIENT)
Facility: CLINIC | Age: 78
Setting detail: OBSERVATION
Discharge: HOME | End: 2018-04-18
Attending: EMERGENCY MEDICINE | Admitting: HOSPITALIST
Payer: MEDICARE

## 2018-04-16 ENCOUNTER — OFFICE VISIT (OUTPATIENT)
Dept: URGENT CARE | Facility: URGENT CARE | Age: 78
End: 2018-04-16
Payer: COMMERCIAL

## 2018-04-16 ENCOUNTER — RADIANT APPOINTMENT (OUTPATIENT)
Dept: GENERAL RADIOLOGY | Facility: CLINIC | Age: 78
End: 2018-04-16
Attending: PHYSICIAN ASSISTANT
Payer: COMMERCIAL

## 2018-04-16 VITALS
WEIGHT: 213 LBS | DIASTOLIC BLOOD PRESSURE: 70 MMHG | HEART RATE: 87 BPM | RESPIRATION RATE: 36 BRPM | TEMPERATURE: 98.5 F | BODY MASS INDEX: 29.71 KG/M2 | OXYGEN SATURATION: 95 % | SYSTOLIC BLOOD PRESSURE: 135 MMHG

## 2018-04-16 DIAGNOSIS — I21.4 NSTEMI (NON-ST ELEVATED MYOCARDIAL INFARCTION) (H): Primary | ICD-10-CM

## 2018-04-16 DIAGNOSIS — R09.89 CHEST CONGESTION: ICD-10-CM

## 2018-04-16 DIAGNOSIS — R79.89 ELEVATED TROPONIN: ICD-10-CM

## 2018-04-16 DIAGNOSIS — Z79.4 TYPE 2 DIABETES MELLITUS WITHOUT COMPLICATION, WITH LONG-TERM CURRENT USE OF INSULIN (H): ICD-10-CM

## 2018-04-16 DIAGNOSIS — I25.10 ATHEROSCLEROSIS OF NATIVE CORONARY ARTERY OF NATIVE HEART WITHOUT ANGINA PECTORIS: Primary | ICD-10-CM

## 2018-04-16 DIAGNOSIS — R06.02 SOB (SHORTNESS OF BREATH): ICD-10-CM

## 2018-04-16 DIAGNOSIS — E11.9 TYPE 2 DIABETES MELLITUS WITHOUT COMPLICATION, WITH LONG-TERM CURRENT USE OF INSULIN (H): ICD-10-CM

## 2018-04-16 DIAGNOSIS — R06.2 WHEEZING: ICD-10-CM

## 2018-04-16 DIAGNOSIS — J20.9 ACUTE BRONCHITIS, UNSPECIFIED ORGANISM: ICD-10-CM

## 2018-04-16 DIAGNOSIS — I48.91 ATRIAL FIBRILLATION, UNSPECIFIED TYPE (H): ICD-10-CM

## 2018-04-16 PROBLEM — I24.9 ACS (ACUTE CORONARY SYNDROME) (H): Status: ACTIVE | Noted: 2018-04-16

## 2018-04-16 LAB
ALBUMIN SERPL-MCNC: 4.2 G/DL (ref 3.4–5)
ALP SERPL-CCNC: 70 U/L (ref 40–150)
ALT SERPL W P-5'-P-CCNC: 12 U/L (ref 0–70)
ANION GAP SERPL CALCULATED.3IONS-SCNC: 8 MMOL/L (ref 3–14)
AST SERPL W P-5'-P-CCNC: 27 U/L (ref 0–45)
BASOPHILS # BLD AUTO: 0 10E9/L (ref 0–0.2)
BASOPHILS NFR BLD AUTO: 0.4 %
BILIRUB SERPL-MCNC: 1.1 MG/DL (ref 0.2–1.3)
BUN SERPL-MCNC: 13 MG/DL (ref 7–30)
CALCIUM SERPL-MCNC: 9.3 MG/DL (ref 8.5–10.1)
CHLORIDE SERPL-SCNC: 105 MMOL/L (ref 94–109)
CO2 SERPL-SCNC: 26 MMOL/L (ref 20–32)
CREAT SERPL-MCNC: 1.05 MG/DL (ref 0.66–1.25)
DIFFERENTIAL METHOD BLD: NORMAL
EOSINOPHIL # BLD AUTO: 0.1 10E9/L (ref 0–0.7)
EOSINOPHIL NFR BLD AUTO: 2.9 %
ERYTHROCYTE [DISTWIDTH] IN BLOOD BY AUTOMATED COUNT: 14.3 % (ref 10–15)
ERYTHROCYTE [DISTWIDTH] IN BLOOD BY AUTOMATED COUNT: 14.8 % (ref 10–15)
FLUAV+FLUBV AG SPEC QL: NEGATIVE
FLUAV+FLUBV AG SPEC QL: NEGATIVE
GFR SERPL CREATININE-BSD FRML MDRD: 69 ML/MIN/1.7M2
GLUCOSE BLDC GLUCOMTR-MCNC: 105 MG/DL (ref 70–99)
GLUCOSE BLDC GLUCOMTR-MCNC: 137 MG/DL (ref 70–99)
GLUCOSE SERPL-MCNC: 120 MG/DL (ref 70–99)
HBA1C MFR BLD: 6.9 % (ref 0–6.4)
HCT VFR BLD AUTO: 42.4 % (ref 40–53)
HCT VFR BLD AUTO: 45.9 % (ref 40–53)
HGB BLD-MCNC: 14.2 G/DL (ref 13.3–17.7)
HGB BLD-MCNC: 15.1 G/DL (ref 13.3–17.7)
INTERPRETATION ECG - MUSE: NORMAL
LYMPHOCYTES # BLD AUTO: 0.9 10E9/L (ref 0.8–5.3)
LYMPHOCYTES NFR BLD AUTO: 17.8 %
MCH RBC QN AUTO: 29.3 PG (ref 26.5–33)
MCH RBC QN AUTO: 29.4 PG (ref 26.5–33)
MCHC RBC AUTO-ENTMCNC: 32.9 G/DL (ref 31.5–36.5)
MCHC RBC AUTO-ENTMCNC: 33.5 G/DL (ref 31.5–36.5)
MCV RBC AUTO: 87 FL (ref 78–100)
MCV RBC AUTO: 90 FL (ref 78–100)
MONOCYTES # BLD AUTO: 0.6 10E9/L (ref 0–1.3)
MONOCYTES NFR BLD AUTO: 11.4 %
NEUTROPHILS # BLD AUTO: 3.3 10E9/L (ref 1.6–8.3)
NEUTROPHILS NFR BLD AUTO: 67.5 %
NT-PROBNP SERPL-MCNC: 1793 PG/ML (ref 0–1800)
PLATELET # BLD AUTO: 148 10E9/L (ref 150–450)
PLATELET # BLD AUTO: NORMAL 10E9/L (ref 150–450)
POTASSIUM SERPL-SCNC: 4.4 MMOL/L (ref 3.4–5.3)
PROT SERPL-MCNC: 7.4 G/DL (ref 6.8–8.8)
RBC # BLD AUTO: 4.85 10E12/L (ref 4.4–5.9)
RBC # BLD AUTO: 5.13 10E12/L (ref 4.4–5.9)
SODIUM SERPL-SCNC: 139 MMOL/L (ref 133–144)
SPECIMEN SOURCE: NORMAL
TROPONIN I SERPL-MCNC: 0.05 UG/L (ref 0–0.04)
TROPONIN I SERPL-MCNC: 0.05 UG/L (ref 0–0.04)
TROPONIN I SERPL-MCNC: 0.06 UG/L (ref 0–0.04)
WBC # BLD AUTO: 4.5 10E9/L (ref 4–11)
WBC # BLD AUTO: 4.9 10E9/L (ref 4–11)

## 2018-04-16 PROCEDURE — 36415 COLL VENOUS BLD VENIPUNCTURE: CPT | Performed by: PHYSICIAN ASSISTANT

## 2018-04-16 PROCEDURE — 80053 COMPREHEN METABOLIC PANEL: CPT | Performed by: PHYSICIAN ASSISTANT

## 2018-04-16 PROCEDURE — 25000131 ZZH RX MED GY IP 250 OP 636 PS 637: Mod: GY

## 2018-04-16 PROCEDURE — 21000001 ZZH R&B HEART CARE

## 2018-04-16 PROCEDURE — 25000128 H RX IP 250 OP 636

## 2018-04-16 PROCEDURE — 84484 ASSAY OF TROPONIN QUANT: CPT | Performed by: INTERNAL MEDICINE

## 2018-04-16 PROCEDURE — 94640 AIRWAY INHALATION TREATMENT: CPT | Performed by: PHYSICIAN ASSISTANT

## 2018-04-16 PROCEDURE — A9270 NON-COVERED ITEM OR SERVICE: HCPCS | Mod: GY | Performed by: INTERNAL MEDICINE

## 2018-04-16 PROCEDURE — 87804 INFLUENZA ASSAY W/OPTIC: CPT | Performed by: EMERGENCY MEDICINE

## 2018-04-16 PROCEDURE — 85025 COMPLETE CBC W/AUTO DIFF WBC: CPT | Performed by: PHYSICIAN ASSISTANT

## 2018-04-16 PROCEDURE — 71046 X-RAY EXAM CHEST 2 VIEWS: CPT | Mod: FY

## 2018-04-16 PROCEDURE — 99220 ZZC INITIAL OBSERVATION CARE,LEVL III: CPT | Performed by: INTERNAL MEDICINE

## 2018-04-16 PROCEDURE — 83880 ASSAY OF NATRIURETIC PEPTIDE: CPT | Performed by: EMERGENCY MEDICINE

## 2018-04-16 PROCEDURE — 83036 HEMOGLOBIN GLYCOSYLATED A1C: CPT | Performed by: INTERNAL MEDICINE

## 2018-04-16 PROCEDURE — 99285 EMERGENCY DEPT VISIT HI MDM: CPT | Mod: 25

## 2018-04-16 PROCEDURE — 84484 ASSAY OF TROPONIN QUANT: CPT | Performed by: PHYSICIAN ASSISTANT

## 2018-04-16 PROCEDURE — 94640 AIRWAY INHALATION TREATMENT: CPT

## 2018-04-16 PROCEDURE — 99207 ZZC OFFICE-HOSPITAL ADMIT: CPT | Performed by: PHYSICIAN ASSISTANT

## 2018-04-16 PROCEDURE — 00000146 ZZHCL STATISTIC GLUCOSE BY METER IP

## 2018-04-16 PROCEDURE — 25000131 ZZH RX MED GY IP 250 OP 636 PS 637: Mod: GY | Performed by: INTERNAL MEDICINE

## 2018-04-16 PROCEDURE — 93000 ELECTROCARDIOGRAM COMPLETE: CPT | Performed by: PHYSICIAN ASSISTANT

## 2018-04-16 PROCEDURE — 25000132 ZZH RX MED GY IP 250 OP 250 PS 637: Mod: GY | Performed by: INTERNAL MEDICINE

## 2018-04-16 PROCEDURE — 36415 COLL VENOUS BLD VENIPUNCTURE: CPT | Performed by: INTERNAL MEDICINE

## 2018-04-16 PROCEDURE — 93005 ELECTROCARDIOGRAM TRACING: CPT

## 2018-04-16 PROCEDURE — 25000125 ZZHC RX 250: Performed by: EMERGENCY MEDICINE

## 2018-04-16 PROCEDURE — 85027 COMPLETE CBC AUTOMATED: CPT | Performed by: INTERNAL MEDICINE

## 2018-04-16 RX ORDER — ALUMINA, MAGNESIA, AND SIMETHICONE 2400; 2400; 240 MG/30ML; MG/30ML; MG/30ML
30 SUSPENSION ORAL EVERY 4 HOURS PRN
Status: DISCONTINUED | OUTPATIENT
Start: 2018-04-16 | End: 2018-04-16

## 2018-04-16 RX ORDER — LEVALBUTEROL INHALATION SOLUTION 1.25 MG/3ML
1 SOLUTION RESPIRATORY (INHALATION) ONCE
Qty: 3 ML
Start: 2018-04-16 | End: 2018-05-01

## 2018-04-16 RX ORDER — ACETAMINOPHEN 325 MG/1
650 TABLET ORAL EVERY 4 HOURS PRN
Status: DISCONTINUED | OUTPATIENT
Start: 2018-04-16 | End: 2018-04-16

## 2018-04-16 RX ORDER — LIDOCAINE 40 MG/G
CREAM TOPICAL
Status: DISCONTINUED | OUTPATIENT
Start: 2018-04-16 | End: 2018-04-16

## 2018-04-16 RX ORDER — LEVOTHYROXINE SODIUM 125 UG/1
125 TABLET ORAL DAILY
Status: DISCONTINUED | OUTPATIENT
Start: 2018-04-16 | End: 2018-04-18 | Stop reason: HOSPADM

## 2018-04-16 RX ORDER — GEMFIBROZIL 600 MG/1
600 TABLET, FILM COATED ORAL
Status: DISCONTINUED | OUTPATIENT
Start: 2018-04-16 | End: 2018-04-18 | Stop reason: HOSPADM

## 2018-04-16 RX ORDER — NITROGLYCERIN 0.4 MG/1
0.4 TABLET SUBLINGUAL EVERY 5 MIN PRN
Status: DISCONTINUED | OUTPATIENT
Start: 2018-04-16 | End: 2018-04-18 | Stop reason: HOSPADM

## 2018-04-16 RX ORDER — FLUTICASONE PROPIONATE 50 MCG
1 SPRAY, SUSPENSION (ML) NASAL EVERY EVENING
Status: DISCONTINUED | OUTPATIENT
Start: 2018-04-16 | End: 2018-04-18 | Stop reason: HOSPADM

## 2018-04-16 RX ORDER — ACETAMINOPHEN 650 MG/1
650 SUPPOSITORY RECTAL EVERY 4 HOURS PRN
Status: DISCONTINUED | OUTPATIENT
Start: 2018-04-16 | End: 2018-04-18 | Stop reason: HOSPADM

## 2018-04-16 RX ORDER — ONDANSETRON 2 MG/ML
4 INJECTION INTRAMUSCULAR; INTRAVENOUS EVERY 6 HOURS PRN
Status: DISCONTINUED | OUTPATIENT
Start: 2018-04-16 | End: 2018-04-18 | Stop reason: HOSPADM

## 2018-04-16 RX ORDER — DEXTROSE MONOHYDRATE 25 G/50ML
25-50 INJECTION, SOLUTION INTRAVENOUS
Status: DISCONTINUED | OUTPATIENT
Start: 2018-04-16 | End: 2018-04-18 | Stop reason: HOSPADM

## 2018-04-16 RX ORDER — NITROGLYCERIN 0.4 MG/1
0.4 TABLET SUBLINGUAL EVERY 5 MIN PRN
Status: DISCONTINUED | OUTPATIENT
Start: 2018-04-16 | End: 2018-04-16

## 2018-04-16 RX ORDER — LANOLIN ALCOHOL/MO/W.PET/CERES
5000 CREAM (GRAM) TOPICAL DAILY
Status: DISCONTINUED | OUTPATIENT
Start: 2018-04-16 | End: 2018-04-18 | Stop reason: HOSPADM

## 2018-04-16 RX ORDER — LISINOPRIL 20 MG/1
20 TABLET ORAL DAILY
Status: DISCONTINUED | OUTPATIENT
Start: 2018-04-16 | End: 2018-04-18 | Stop reason: HOSPADM

## 2018-04-16 RX ORDER — LIDOCAINE 40 MG/G
CREAM TOPICAL
Status: DISCONTINUED | OUTPATIENT
Start: 2018-04-16 | End: 2018-04-18 | Stop reason: HOSPADM

## 2018-04-16 RX ORDER — ASPIRIN 81 MG/1
81 TABLET, CHEWABLE ORAL DAILY
Status: DISCONTINUED | OUTPATIENT
Start: 2018-04-16 | End: 2018-04-16

## 2018-04-16 RX ORDER — NICOTINE POLACRILEX 4 MG
15-30 LOZENGE BUCCAL
Status: DISCONTINUED | OUTPATIENT
Start: 2018-04-16 | End: 2018-04-18 | Stop reason: HOSPADM

## 2018-04-16 RX ORDER — METOPROLOL TARTRATE 50 MG
50 TABLET ORAL 2 TIMES DAILY
Status: DISCONTINUED | OUTPATIENT
Start: 2018-04-16 | End: 2018-04-18 | Stop reason: HOSPADM

## 2018-04-16 RX ORDER — ALUMINA, MAGNESIA, AND SIMETHICONE 2400; 2400; 240 MG/30ML; MG/30ML; MG/30ML
30 SUSPENSION ORAL EVERY 4 HOURS PRN
Status: DISCONTINUED | OUTPATIENT
Start: 2018-04-16 | End: 2018-04-18 | Stop reason: HOSPADM

## 2018-04-16 RX ORDER — ACETAMINOPHEN 325 MG/1
650 TABLET ORAL EVERY 4 HOURS PRN
Status: DISCONTINUED | OUTPATIENT
Start: 2018-04-16 | End: 2018-04-18 | Stop reason: HOSPADM

## 2018-04-16 RX ORDER — ASPIRIN 81 MG/1
81 TABLET, CHEWABLE ORAL DAILY
Status: DISCONTINUED | OUTPATIENT
Start: 2018-04-17 | End: 2018-04-16

## 2018-04-16 RX ORDER — ASPIRIN 325 MG
325 TABLET ORAL DAILY
Qty: 1 TABLET | Refills: 0 | Status: ON HOLD | OUTPATIENT
Start: 2018-04-16 | End: 2018-04-18

## 2018-04-16 RX ORDER — ONDANSETRON 4 MG/1
4 TABLET, ORALLY DISINTEGRATING ORAL EVERY 6 HOURS PRN
Status: DISCONTINUED | OUTPATIENT
Start: 2018-04-16 | End: 2018-04-18 | Stop reason: HOSPADM

## 2018-04-16 RX ORDER — DEXTROSE MONOHYDRATE 25 G/50ML
25-50 INJECTION, SOLUTION INTRAVENOUS
Status: DISCONTINUED | OUTPATIENT
Start: 2018-04-16 | End: 2018-04-16

## 2018-04-16 RX ORDER — PRAVASTATIN SODIUM 40 MG
80 TABLET ORAL EVERY EVENING
Status: DISCONTINUED | OUTPATIENT
Start: 2018-04-16 | End: 2018-04-18 | Stop reason: HOSPADM

## 2018-04-16 RX ORDER — NICOTINE POLACRILEX 4 MG
15-30 LOZENGE BUCCAL
Status: DISCONTINUED | OUTPATIENT
Start: 2018-04-16 | End: 2018-04-16

## 2018-04-16 RX ORDER — ASPIRIN 81 MG/1
324 TABLET, CHEWABLE ORAL ONCE
Status: DISCONTINUED | OUTPATIENT
Start: 2018-04-16 | End: 2018-04-16

## 2018-04-16 RX ORDER — NALOXONE HYDROCHLORIDE 0.4 MG/ML
.1-.4 INJECTION, SOLUTION INTRAMUSCULAR; INTRAVENOUS; SUBCUTANEOUS
Status: DISCONTINUED | OUTPATIENT
Start: 2018-04-16 | End: 2018-04-18 | Stop reason: HOSPADM

## 2018-04-16 RX ORDER — ASPIRIN 325 MG
325 TABLET ORAL DAILY
Status: DISCONTINUED | OUTPATIENT
Start: 2018-04-17 | End: 2018-04-18

## 2018-04-16 RX ORDER — ACETAMINOPHEN 650 MG/1
650 SUPPOSITORY RECTAL EVERY 4 HOURS PRN
Status: DISCONTINUED | OUTPATIENT
Start: 2018-04-16 | End: 2018-04-16

## 2018-04-16 RX ORDER — IPRATROPIUM BROMIDE AND ALBUTEROL SULFATE 2.5; .5 MG/3ML; MG/3ML
3 SOLUTION RESPIRATORY (INHALATION) ONCE
Status: COMPLETED | OUTPATIENT
Start: 2018-04-16 | End: 2018-04-16

## 2018-04-16 RX ORDER — LORATADINE 10 MG/1
10 TABLET ORAL DAILY
Status: DISCONTINUED | OUTPATIENT
Start: 2018-04-16 | End: 2018-04-18 | Stop reason: HOSPADM

## 2018-04-16 RX ADMIN — Medication 1 TABLET: at 21:15

## 2018-04-16 RX ADMIN — PRAVASTATIN SODIUM 80 MG: 40 TABLET ORAL at 21:15

## 2018-04-16 RX ADMIN — LEVOTHYROXINE SODIUM 125 MCG: 125 TABLET ORAL at 17:54

## 2018-04-16 RX ADMIN — Medication 2500 UNITS: at 18:40

## 2018-04-16 RX ADMIN — LISINOPRIL 20 MG: 20 TABLET ORAL at 17:54

## 2018-04-16 RX ADMIN — CHOLECALCIFEROL CAP 125 MCG (5000 UNIT) 5000 UNITS: 125 CAP at 17:54

## 2018-04-16 RX ADMIN — INSULIN DETEMIR 35 UNITS: 100 INJECTION, SOLUTION SUBCUTANEOUS at 21:22

## 2018-04-16 RX ADMIN — CYANOCOBALAMIN TAB 1000 MCG 5000 MCG: 1000 TAB at 17:54

## 2018-04-16 RX ADMIN — HEPARIN SODIUM 1000 UNITS/HR: 10000 INJECTION, SOLUTION INTRAVENOUS at 18:40

## 2018-04-16 RX ADMIN — LORATADINE 10 MG: 10 TABLET ORAL at 17:54

## 2018-04-16 RX ADMIN — IPRATROPIUM BROMIDE AND ALBUTEROL SULFATE 3 ML: .5; 3 SOLUTION RESPIRATORY (INHALATION) at 14:13

## 2018-04-16 RX ADMIN — RANITIDINE 150 MG: 150 TABLET ORAL at 21:15

## 2018-04-16 RX ADMIN — FLUTICASONE PROPIONATE 1 SPRAY: 50 SPRAY, METERED NASAL at 21:22

## 2018-04-16 RX ADMIN — METOPROLOL TARTRATE 50 MG: 50 TABLET ORAL at 21:15

## 2018-04-16 RX ADMIN — INSULIN ASPART 6 UNITS: 100 INJECTION, SOLUTION INTRAVENOUS; SUBCUTANEOUS at 18:35

## 2018-04-16 ASSESSMENT — ENCOUNTER SYMPTOMS
VOMITING: 0
NAUSEA: 0
SHORTNESS OF BREATH: 1
FEVER: 0
ABDOMINAL PAIN: 0
COUGH: 1

## 2018-04-16 ASSESSMENT — ACTIVITIES OF DAILY LIVING (ADL)
BATHING: 0-->INDEPENDENT
FALL_HISTORY_WITHIN_LAST_SIX_MONTHS: NO
COGNITION: 0 - NO COGNITION ISSUES REPORTED
AMBULATION: 0-->INDEPENDENT
RETIRED_EATING: 0-->INDEPENDENT
DRESS: 0-->INDEPENDENT
TOILETING: 0-->INDEPENDENT
TRANSFERRING: 0-->INDEPENDENT
RETIRED_COMMUNICATION: 0-->UNDERSTANDS/COMMUNICATES WITHOUT DIFFICULTY
SWALLOWING: 0-->SWALLOWS FOODS/LIQUIDS WITHOUT DIFFICULTY

## 2018-04-16 NOTE — H&P
Admitted:     04/16/2018      CHIEF COMPLAINT:  Cough.      HISTORY OF PRESENT ILLNESS:  Pascual Perea is a 77-year-old male who has had a cold with a cough productive of phlegm for about 6 days.  The patient went to the [facility] today, 04/16/2018, where he had a chief complaint of shortness of breath and some chest tightness.  The patient says his symptoms started about last Tuesday, or 6 days prior to admission.  He feels like his coughing, however, has gotten worse.  He says to me that his chest tightness only occurs when he is coughing and he does not have chest tightness when he is not coughing.  He denies chest pain.  The patient in the clinic, given his history of coronary artery, disease, had an EKG that showed AFib with some T-wave inversions laterally, fairly similar to prior, but there is also slight ST depression in V6.  The patient also had a troponin of 0.048.  Given his troponin and his EKG, he was sent to the emergency room.  He did have a workup, however, at the clinic with labs and also a chest x-ray, which was negative for any infiltrate.  The patient says he has been having shortness of breath with walking upstairs.  This does not appear to be new, but is perhaps worsened maybe by his upper respiratory infection.  He specifically denies abdominal pain.  He says that he is not having any fevers, although 2 nights ago he had an episode of shaking chills.  The patient says that when he does cough he feels like he gets some substernal chest burning.  The patient is on Eliquis for AFib.  He is followed by Dr. Olivas and Dr. Jarvis and actually has an appointment with Dr. Jarvis this coming Monday for follow up of his AFib/flutter.  In addition to coronary artery disease, the patient has diabetes under good control with his last A1c 6.4 on 09/13/2017.  The patient is being admitted due to his troponin elevation and EKG abnormalities.      PAST MEDICAL HISTORY:   1.  History of anemia.   2.  History  of coronary artery disease.  He had a CABG, 1 vessel, LIMA to LAD in .   3.  History of hyperlipidemia.  His last lipids from 2017 showed a cholesterol of 104, LDL of 31, HDL of 40 and triglyceride of 166.   4.  History of hypothyroidism, not on replacement.  His last TSH from 2017 was 0.75.   5.  History of testosterone deficiency.   6.  History of type 2 diabetes, under good control.   7.  History of atrial flutter.   8.  History of vitamin D deficiency.   9.  History of GERD.   10.  History of pulmonary hypertension.  The patient had an echocardiogram 2016, which showed mild pulmonary hypertension, 35-40 mmHg.   11.  History of obstructive sleep apnea.  The patient stopped using CPAP many years ago.   12.  History of neuropathy secondary to diabetes.      PAST SURGICAL HISTORY:   1.  History of CABG, 1 vessel, FELIPE to LAD.   2.  History of cataract surgery.   3.  History of septoplasty and turbinoplasty.      ALLERGIES:  OMEPRAZOLE.      MEDICATIONS ON ADMISSION:   1.  Apixaban 5 mg b.i.d.   2.  Aspirin 325 mg daily.   3.  Cholecalciferol 5000 units daily.   4.  Vitamin B12 at 1000 mcg tablets, takes 5000 mcg daily.   5.  Ferrous fumarate 65 mg of elemental iron and vitamin C 125 mg 1 tab twice a day.   6.  Flonase 50 mcg per actuation 1-2 sprays each nostril every day.   7.  Gemfibrozil 600 mg b.i.d.   8.  Aspart 2 units per carb.   9.  Detemir 40-45 units subq at bedtime.   10.  Levalbuterol nebs.   11.  Lisinopril 20 mg daily.   12.  Claritin 10 mg daily.   13.  Metformin 1000 mg b.i.d.   14.  Metoprolol 50 mg b.i.d.   15.  Nitroglycerin p.r.n.   16.  Pravastatin 80 mg daily.   17.  Zantac 150 mg b.i.d.   18.  Testosterone 300 mg IM every 14 days.      FAMILY HISTORY:  Father  at 90, mother  at 68 of a heart attack.      SOCIAL HISTORY:  The patient is .  He is accompanied by his wife.  He is a retired .  He and his wife have 4 grown children.  They live in  their own house.  The patient used to smoke, but only 7 or 8 years, 1 pack per day.  He quit in the 1960s.  Alcohol use is very rare.      REVIEW OF SYSTEMS:  A 10-point review of systems is negative except as in history of present illness.      PHYSICAL EXAMINATION:   GENERAL:  Obese male, but otherwise in no apparent distress.  He does have a frequent cough.   VITAL SIGNS:  Blood pressure 147/76, pulse 87, respiratory rate 24, temp of 99.6.   HEENT:  Pupils are equal.  Extraocular movements are intact.  Pharynx without erythema.   NECK:  Supple, no adenopathy.   CHEST:  Decreased breath sounds, but no wheezes or rhonchi.   CARDIOVASCULAR:  Irregularly irregular, normal S1 and S2.   ABDOMEN:  Obese, soft, nontender.   EXTREMITIES:  No edema.  DP pulses are 3+ bilaterally.   SKIN:  The patient has no obvious sores or ulcers, no rashes.   NEUROLOGIC:  The patient is alert and oriented.  He moves all extremities.   PSYCHIATRIC:  He has a normal affect.      DIAGNOSTIC DATA:  His EKG shows AFib, T-wave inversions laterally, fairly similar to old, and also about a 1 mm ST depression in V6.  Sodium 139, potassium 4.4, chloride 105, bicarb 26, BUN 13, creatinine 1.05.  LFTs within normal limits.  His last A1c was from 02/12/2018 and was 6.5.  Troponin 0.048, BNP 1793.  White count 4.9, hemoglobin 15.1, platelet count was clumped.  His last platelet count was 158 on 02/12/2018.  Influenza was negative.  Chest x-ray showed no acute cardiopulmonary abnormality.      ASSESSMENT AND PLAN:  Pascual Perea is a very pleasant 77-year-old male with a history of type 2 diabetes, under excellent control.  He also has a known history of coronary artery disease and has hypertension and hyperlipidemia as well as testosterone deficiency.  The patient's lipids from 09/2017 showed excellent control on his current statin with an LDL of only 31.  He had an echocardiogram 05/2016, which showed left ventricular normal size, ejection fraction  of 55-60%, right ventricular normal size and function with no wall motion abnormalities, mild pulmonary hypertension and no significant valvular disease.  His last stress test was a nuclear medicine stress test that showed a very small fixed apical defect, which may represent a nontransmural infarct, no wall motion abnormalities and an ejection fraction of 61%.  He is followed by Dr. Olivas who last saw the patient in 2017, with plans to see back in 1 year.  He actually has an appointment with Dr. Jarvis this coming Monday to follow up on his atrial fibrillation.     1.  Elevated troponin.  The patient really does not have classical chest pain, however, as he only has some substernal burning when he is coughing.  I do not think that this is angina, but his troponin may be elevated due to some demand ischemia given his coughing and his upper respiratory tract infection.  We will get serial troponins and continue his usual medications.  We will also get a repeat cardiac echocardiogram as his last echocardiogram was from 2016, and if his troponins are flat and do not increase, could consider a nuclear stress test.  I did order this, but it can be canceled if his troponins elevate.  The patient is followed by Dr. Olivas and Dr. Jarvis from [facility].  We will continue the patient on his aspirin also.     2.  Atrial fibrillation.  The patient will be continued on with Eliquis and metoprolol as currently rate is controlled.   3.  History of type 2 diabetes.  The patient uses carbohydrate counting and detemir.  We will decrease his dose of detemir slightly and we will have him on his carbohydrate counting as well as a low sliding scale for now.   4.  History of hypertension.  We will restart his usual medications with parameters.   5.  History of hypothyroidism.  The patient is not on any thyroid replacement.  He is followed by Dr. Elizalde.   6.  History of testosterone deficiency.   7.  History of mild pulmonary  hypertension seen on echocardiogram .   8.  History of obstructive sleep apnea.  The patient does not use CPAP.   9.  History of vitamin D deficiency.   10.  Anticoagulation:  The patient is currently on Eliquis.      CODE STATUS:  Full code.      DEEP VENOUS THROMBOSIS PROPHYLAXIS:  The patient is already on Eliquis.      DISPOSITION:  The patient will be admitted under inpatient status.         LAKSHMI GONSALVES MD             D: 2018   T: 2018   MT: [id number]      Name:     JESÚS VASQUEZ   MRN:      [id number]        Account:      [id number]   :      1940        Admitted:     2018                   Document: [id number]

## 2018-04-16 NOTE — MR AVS SNAPSHOT
After Visit Summary   4/16/2018    Pascual Perea    MRN: [id number]           Patient Information     Date Of Birth          1940        Visit Information        Provider Department      4/16/2018 10:55 AM Blake Rivas PA-C [facility]        Today's Diagnoses     NSTEMI (non-ST elevated myocardial infarction) (H)    -  1    SOB (shortness of breath)        Wheezing        Type 2 diabetes mellitus without complication, with long-term current use of insulin (H)        Chest congestion           Follow-ups after your visit        Your next 10 appointments already scheduled     Apr 23, 2018  4:15 PM CDT   [facility] EP RETURN with Satnam Kasper MD   [facility] ([facility])    [address] [place] [id number] [phone] OPT 2              Who to contact     If you have questions or need follow up information about today's clinic visit or your schedule please contact [facility] directly at [phone].  Normal or non-critical lab and imaging results will be communicated to you by RelayFoodshart, letter or phone within 4 business days after the clinic has received the results. If you do not hear from us within 7 days, please contact the clinic through RelayFoodshart or phone. If you have a critical or abnormal lab result, we will notify you by phone as soon as possible.  Submit refill requests through Tutum or call your pharmacy and they will forward the refill request to us. Please allow 3 business days for your refill to be completed.          Additional Information About Your Visit        MyChart Information     Tutum gives you secure access to your electronic health record. If you see a primary care provider, you can also send messages to your care team and make appointments. If you have questions, please call your primary care clinic.  If you do not have a primary care provider,  please call [phone] and they will assist you.        Care EveryWhere ID     This is your Care EveryWhere ID. This could be used by other organizations to access your [facility] medical records  [id number]        Your Vitals Were     Pulse Temperature Respirations Pulse Oximetry BMI (Body Mass Index)       87 98.5  F (36.9  C) 36 95% 29.71 kg/m2        Blood Pressure from Last 3 Encounters:   04/16/18 135/70   01/29/18 114/72   09/21/17 116/70    Weight from Last 3 Encounters:   04/16/18 213 lb (96.6 kg)   01/29/18 207 lb (93.9 kg)   09/21/17 199 lb (90.3 kg)              We Performed the Following     CBC with platelets differential     Comprehensive metabolic panel     EKG 12-lead complete w/read - Clinics     INHALATION/NEBULIZER TREATMENT, INITIAL     LEVALBUTEROL UNIT DOSE, 0.5 MG     Troponin I          Today's Medication Changes          These changes are accurate as of 4/16/18 12:47 PM.  If you have any questions, ask your nurse or doctor.               Start taking these medicines.        Dose/Directions    levalbuterol 1.25 MG/3ML neb solution   Commonly known as:  XOPENEX   Used for:  Wheezing, SOB (shortness of breath)   Started by:  Blake Rivas PA-C        Dose:  1 ampule   Take 3 mLs (1.25 mg) by nebulization once for 1 dose   Quantity:  3 mL   Refills:  0         These medicines have changed or have updated prescriptions.        Dose/Directions    * aspirin 81 MG tablet   This may have changed:  Another medication with the same name was added. Make sure you understand how and when to take each.   Changed by:  Blake Rivas PA-C        Take by mouth At Bedtime   Refills:  0       * aspirin 325 MG tablet   This may have changed:  You were already taking a medication with the same name, and this prescription was added. Make sure you understand how and when to take each.   Used for:  NSTEMI (non-ST elevated myocardial infarction) (H)   Changed by:  Blake Rivas PA-C        Dose:  325 mg    Take 1 tablet (325 mg) by mouth daily   Quantity:  1 tablet   Refills:  0       * Notice:  This list has 2 medication(s) that are the same as other medications prescribed for you. Read the directions carefully, and ask your doctor or other care provider to review them with you.         Where to get your medicines      Some of these will need a paper prescription and others can be bought over the counter.  Ask your nurse if you have questions.     Bring a paper prescription for each of these medications     aspirin 325 MG tablet       You don't need a prescription for these medications     levalbuterol 1.25 MG/3ML neb solution                Primary Care Provider Office Phone # Fax #    Andrew Elizalde [phone]       [address] [facility] [address]        Equal Access to Services     AMY FLORES : Odette easono Sodwayne, waaxda luqadaha, qaybta kaalmada aderajwinder, suhail cardenas. So [facility] [phone].    ATENCIÓN: Si habla español, tiene a cooley disposición servicios gratuitos de asistencia lingüística. Llame al [phone].    We comply with applicable federal civil rights laws and [place] laws. We do not discriminate on the basis of race, color, national origin, age, disability, sex, sexual orientation, or gender identity.            Thank you!     Thank you for choosing [facility]  for your care. Our goal is always to provide you with excellent care. Hearing back from our patients is one way we can continue to improve our services. Please take a few minutes to complete the written survey that you may receive in the mail after your visit with us. Thank you!             Your Updated Medication List - Protect others around you: Learn how to safely use, store and throw away your medicines at www.disposemymeds.org.          This list is accurate as of 4/16/18 12:47 PM.  Always use your most recent med list.                   Brand  Name Dispense Instructions for use Diagnosis    apixaban ANTICOAGULANT 5 MG tablet    ELIQUIS    60 tablet    Take 1 tablet (5 mg) by mouth 2 times daily    Typical atrial flutter (H)       * aspirin 81 MG tablet      Take by mouth At Bedtime        * aspirin 325 MG tablet     1 tablet    Take 1 tablet (325 mg) by mouth daily    NSTEMI (non-ST elevated myocardial infarction) (H)       blood glucose monitoring test strip    ONETOUCH ULTRA    200 each    Test twice daily with One Touch Ultra Blue    Type 2 diabetes mellitus without complication, with long-term current use of insulin (H)       cyanocobalamin 1000 MCG tablet    vitamin  B-12     Take 5,000 mcg by mouth daily.        ferrous fumarate 65 mg (Pauma. FE)-Vitamin C 125 mg  MG Tabs tablet    VITRON-C    60 tablet    1 tab twice a day for anemia    Iron deficiency anemia, unspecified iron deficiency anemia type       fluticasone 50 MCG/ACT spray    FLONASE    48 g    USE ONE TO TWO SPRAYS IN EACH NOSTRIL EVERY DAY    Chronic allergic rhinitis, unspecified seasonality, unspecified trigger       gemfibrozil 600 MG tablet    LOPID    180 tablet    TAKE ONE TABLET BY MOUTH TWICE A DAY    Hyperlipidemia LDL goal <70       glucose 4 g Chew chewable tablet     30    as directed    Type II or unspecified type diabetes mellitus without mention of complication, not stated as uncontrolled       insulin aspart 100 UNIT/ML injection    NovoLOG FLEXPEN    30 mL    Inject 3 times daily (before meals). 2 units per carb choice (approx 5-10 units/meal)    Type 2 diabetes mellitus without complication, with long-term current use of insulin (H)       insulin detemir 100 UNIT/ML injection    LEVEMIR FLEXPEN/FLEXTOUCH    45 mL    Inject 40-45 Units Subcutaneous At Bedtime    Type 2 diabetes mellitus without complication, with long-term current use of insulin (H)       insulin pen needle 30G X 8 MM    NOVOFINE 30    400 each    1 Syringe 4 times daily    Type 2 diabetes  "mellitus without complication, with long-term current use of insulin (H)       levalbuterol 1.25 MG/3ML neb solution    XOPENEX    3 mL    Take 3 mLs (1.25 mg) by nebulization once for 1 dose    Wheezing, SOB (shortness of breath)       levothyroxine 125 MCG tablet    SYNTHROID/LEVOTHROID    90 tablet    TAKE ONE TABLET BY MOUTH EVERY DAY    Hypothyroidism, unspecified type       lisinopril 20 MG tablet    PRINIVIL/ZESTRIL    90 tablet    TAKE ONE TABLET BY MOUTH EVERY DAY    Essential hypertension, benign       loratadine 10 MG tablet    CLARITIN    90 tablet    TAKE ONE TABLET BY MOUTH EVERY DAY    Chronic allergic rhinitis, unspecified seasonality, unspecified trigger       metFORMIN 1000 MG tablet    GLUCOPHAGE    60 tablet    TAKE ONE TABLET BY MOUTH TWO TIMES A DAY WITH MEALS    Type 2 diabetes mellitus without complication (H)       metoprolol tartrate 50 MG tablet    LOPRESSOR    180 tablet    TAKE ONE TABLET BY MOUTH TWICE A DAY    Essential hypertension, benign       nitroGLYcerin 0.4 MG sublingual tablet    NITROSTAT    25 tablet    Place 1 tablet (0.4 mg) under the tongue every 5 minutes as needed for chest pain    Unstable angina (H)       ONETOUCH DELICA LANCETS 33G Misc     300 each    1 Device 3 times daily . Use to test blood sugars 3 times daily or as directed.    Type 2 diabetes, HbA1C goal < 8% (H)       order for DME     30 Device    Equipment being ordered: 3ml syringes and 22G 1 1/2\" needles to use with testosterone inj every 2 weeks    Testosterone deficiency       pravastatin 80 MG tablet    PRAVACHOL    90 tablet    TAKE ONE TABLET BY MOUTH EVERY NIGHT AT BEDTIME    Hyperlipidemia LDL goal <70       ranitidine 150 MG tablet    ZANTAC    180 tablet    TAKE ONE TABLET BY MOUTH TWICE A DAY    Gastroesophageal reflux disease without esophagitis       testosterone cypionate 200 MG/ML injection    DEPOTESTOTERONE    10 vial    Inject 1.5 mLs (300 mg) into the muscle every 14 days    Testosterone " deficiency       VITAMIN D3 PO      Take 5,000 Units by mouth daily        * Notice:  This list has 2 medication(s) that are the same as other medications prescribed for you. Read the directions carefully, and ask your doctor or other care provider to review them with you.

## 2018-04-16 NOTE — PROGRESS NOTES
SUBJECTIVE:   Pascual Perea is a 77 year old male presenting with a chief complaint of having SOB, chest tightness.  Onset of symptoms was 5 day(s) ago.  Course of illness is same.    Severity moderately severe  Current and Associated symptoms: SOB and chest tightness  Treatment measures tried include nothing.  Predisposing factors include feels like maybe had a cold initially.    Past Medical History:   Diagnosis Date     Adhesive capsulitis of shoulder      Anemia 3/10/2014     Anemia, unspecified      Antiplatelet or antithrombotic long-term use      Arrhythmia     Atrial fib/flutter     CAD (coronary artery disease)      Hyperlipidemia LDL goal <70 5/26/2011     Hypertension      Hypothyroidism      Impotence of organic origin      Laceration of finger  June 2010     left thumb s/p sutures     Numbness and tingling     diabetic neuropathy bilateral feet     BRANDON (obstructive sleep apnea)     intolerant of CPAP, uses it occasionally.  Using mandibular device occasionally     Osteopenia      Pulmonary hypertension 4/6/2012     Sensorineural hearing loss, unspecified      Stented coronary artery 1997    CABG      Testosterone deficiency      Type 2 diabetes mellitus without complication  (goal A1C<8) 10/24/2015     Typical atrial flutter (H) 4/18/16     Unspecified hypothyroidism      Vitamin D deficiency 9/3/2011      FAM HX  HTN  CVD  CAD  Diabetes  Obesity  Cancer    Allergies   Allergen Reactions     Omeprazole      diarrhea         Social History   Substance Use Topics     Smoking status: Former Smoker     Packs/day: 1.00     Years: 6.00     Quit date: 7/1/1964     Smokeless tobacco: Never Used      Comment: quit 1964     Alcohol use No       ROS:  CONSTITUTIONAL:NEGATIVE for fever, chills, change in weight  INTEGUMENTARY/SKIN: NEGATIVE for worrisome rashes, moles or lesions  EYES: NEGATIVE for vision changes or irritation  ENT/MOUTH: NEGATIVE for ear, mouth and throat problems  RESP:POSITIVE for feeling  chest tightness and SOB  CV: POSITIVE for mild chest tightness  GI: NEGATIVE for nausea, abdominal pain, heartburn, or change in bowel habits  : negative for dysuria, hematuria, decreased urinary stream, erectile dysfunction  MUSCULOSKELETAL: NEGATIVE for significant arthralgias or myalgia  NEURO: NEGATIVE for weakness, dizziness or paresthesias    OBJECTIVE  :/70  Pulse 87  Temp 98.5  F (36.9  C)  Resp (!) 36  Wt 213 lb (96.6 kg)  SpO2 95%  BMI 29.71 kg/m2  GENERAL APPEARANCE: healthy, alert and no distress  EYES: EOMI,  PERRL, conjunctiva clear  HENT: ear canals and TM's normal.  Nose and mouth without ulcers, erythema or lesions  NECK: supple, nontender, no lymphadenopathy  RESP: expiratory wheezes mild and moderate  CV: regular rates and rhythm, normal S1 S2, no murmur noted  ABDOMEN:  soft, nontender, no HSM or masses and bowel sounds normal  : Deferred  VASC: Negative for cold extremities   NEURO: Normal strength and tone, sensory exam grossly normal,  normal speech and mentation  SKIN: no suspicious lesions or rashes    EKG Positive for ST depression, t wave changes    Results for orders placed or performed in visit on 04/16/18   Comprehensive metabolic panel   Result Value Ref Range    Sodium 139 133 - 144 mmol/L    Potassium 4.4 3.4 - 5.3 mmol/L    Chloride 105 94 - 109 mmol/L    Carbon Dioxide 26 20 - 32 mmol/L    Anion Gap 8 3 - 14 mmol/L    Glucose 120 (H) 70 - 99 mg/dL    Urea Nitrogen 13 7 - 30 mg/dL    Creatinine 1.05 0.66 - 1.25 mg/dL    GFR Estimate 69 >60 mL/min/1.7m2    GFR Estimate If Black 83 >60 mL/min/1.7m2    Calcium 9.3 8.5 - 10.1 mg/dL    Bilirubin Total 1.1 0.2 - 1.3 mg/dL    Albumin 4.2 3.4 - 5.0 g/dL    Protein Total 7.4 6.8 - 8.8 g/dL    Alkaline Phosphatase 70 40 - 150 U/L    ALT 12 0 - 70 U/L    AST 27 0 - 45 U/L   CBC with platelets differential   Result Value Ref Range    WBC 4.9 4.0 - 11.0 10e9/L    RBC Count 5.13 4.4 - 5.9 10e12/L    Hemoglobin 15.1 13.3 - 17.7  g/dL    Hematocrit 45.9 40.0 - 53.0 %    MCV 90 78 - 100 fl    MCH 29.4 26.5 - 33.0 pg    MCHC 32.9 31.5 - 36.5 g/dL    RDW 14.8 10.0 - 15.0 %    Platelet Count PENDING 150 - 450 10e9/L    Diff Method Automated Method     % Neutrophils 67.5 %    % Lymphocytes 17.8 %    % Monocytes 11.4 %    % Eosinophils 2.9 %    % Basophils 0.4 %    Absolute Neutrophil 3.3 1.6 - 8.3 10e9/L    Absolute Lymphocytes 0.9 0.8 - 5.3 10e9/L    Absolute Monocytes 0.6 0.0 - 1.3 10e9/L    Absolute Eosinophils 0.1 0.0 - 0.7 10e9/L    Absolute Basophils 0.0 0.0 - 0.2 10e9/L   Troponin I   Result Value Ref Range    Troponin I ES 0.048 (H) 0.000 - 0.045 ug/L     Chest xray Negative for acute findings, read by Blake ESCALANTE at time of visit.    ASSESSMENT/PLAN:      ICD-10-CM    1. NSTEMI (non-ST elevated myocardial infarction) (H) I21.4 aspirin 325 MG tablet   2. SOB (shortness of breath) R06.02 Troponin I     INHALATION/NEBULIZER TREATMENT, INITIAL     levalbuterol (XOPENEX) 1.25 MG/3ML neb solution     LEVALBUTEROL UNIT DOSE, 0.5 MG     EKG 12-lead complete w/read - Clinics     CANCELED: XR Chest 2 Views   3. Wheezing R06.2 INHALATION/NEBULIZER TREATMENT, INITIAL     levalbuterol (XOPENEX) 1.25 MG/3ML neb solution     CANCELED: XR Chest 2 Views   4. Type 2 diabetes mellitus without complication, with long-term current use of insulin (H) E11.9     Z79.4    5. Chest congestion R09.89 Comprehensive metabolic panel     CBC with platelets differential       Patient given  mg in clinic  EKG done for cardiac changes, they are present  Troponin I shows elevation  CBC and CMP done to evaluated  Patient sent to the ED via ambulance due to troponin and EKG  See orders in Epic

## 2018-04-16 NOTE — ED NOTES
"[facility]  ED Nurse Handoff Report    ED Chief complaint: Chest Pain (went to clinic for chest congestion x1 week- EKG done showing ST depression at clinic- trop elevated at clinic. )      ED Diagnosis:   Final diagnoses:   None       Code Status: Hospitalist to discuss with patient.    Allergies:   Allergies   Allergen Reactions     Omeprazole      diarrhea       Activity level - Baseline/Home:  Independent    Activity Level - Current:   Independent     Needed?: No    Isolation: No  Infection: Not Applicable    Bariatric?: No    Vital Signs:   Vitals:    04/16/18 1332   BP: 147/76   Pulse: 87   Resp: 24   Temp: 99.6  F (37.6  C)   TempSrc: Oral   SpO2: 95%   Weight: 96.6 kg (213 lb)   Height: 1.803 m (5' 11\")       Cardiac Rhythm: ,        Pain level:      Is this patient confused?: No     Patient Report: Initial Complaint: Patient went to clinic earlier today for cough and SOB x week. EKG and Troponin completed at clinic abnormal- patient arrived to ED via EMS.   Focused Assessment: Pt denies pain in ED- c/o cough and SOB while sitting in bed. Pt is alert and orientated. VSS. Chest xray and labs completed in clinic. Duo neb ordered in ED.   Tests Performed: Labs  Abnormal Results: Labs  Treatments provided: Duo neb    Family Comments: Wife at bedside.    OBS brochure/video discussed/provided to patient: N/A    ED Medications: Medications - No data to display    Drips infusing?:  No    For the majority of the shift this patient was Green.   Interventions performed were NA.    Severe Sepsis OR Septic Shock Diagnosis Present: No      ED NURSE PHONE NUMBER: [phone]         "

## 2018-04-16 NOTE — IP AVS SNAPSHOT
MRN:[id number]                      After Visit Summary   4/16/2018    Pascual Perea    MRN: [id number]           Thank you!     Thank you for choosing [facility] for your care. Our goal is always to provide you with excellent care. Hearing back from our patients is one way we can continue to improve our services. Please take a few minutes to complete the written survey that you may receive in the mail after you visit with us. Thank you!        Patient Information     Date Of Birth          1940        Designated Caregiver       Most Recent Value    Caregiver    Will someone help with your care after discharge? yes    Name of designated caregiver Shanon    Phone number of caregiver [phone]    Caregiver address [address] [facility]      About your hospital stay     You were admitted on:  April 16, 2018 [facility]    You were discharged on:  April 18, 2018        Reason for your hospital stay       You where admitted from urgent care with elevated heart muscle enzymes. You have undergone a stress test. Continue current medications and follow-up with cardiology as outpatient.                  Who to Call     For medical emergencies, please call 911.  For non-urgent questions about your medical care, please call your primary care provider or clinic, [phone]          Attending Provider     Provider Specialty    Madhuri Pryor MD Emergency Medicine    Sofia Khan MD Internal Medicine    Anthony Cruz MD Internal Medicine       Primary Care Provider Office Phone # Fax #    Andrew Elizalde [phone]       When to contact your care team       Call your primary doctor if you have any of the following:  increased shortness of breath or increased pain.                  After Care Instructions     Activity       Your activity upon discharge: activity as tolerated            Diet        Low-fat low Saturated Fat Na <2400mg Diet, No Caffeine Diet            Discharge Instructions       Aggressive incentives spirometry.  Supportive care with adequate oral hydration,  over-the-counter Tylenol as needed.  consider lifestyle modification with diet and exercise.  Consider sleep studies as outpatient.               Monitor and record       blood pressure and heart rate daily  monitor blood sugars at least 2 to 3 times a day                  Follow-up Appointments     Follow-up and recommended labs and tests        Follow up with primary care provider, Andrew Elizalde, within 7 days for hospital follow- up.  No follow up labs or test are needed.                  Your next 10 appointments already scheduled     Apr 23, 2018  4:15 PM CDT   [facility] EP RETURN with Satnam Kasper MD   [facility] ([facility])    [address] [place] [id number] [phone] OPT 2            May 01, 2018  2:00 PM CDT   Office Visit with Andrew Elizalde MD   [facility]    [address] [place] [address] [phone]           Bring a current list of meds and any records pertaining to this visit. For Physicals, please bring immunization records and any forms needing to be filled out. Please arrive 10 minutes early to complete paperwork.            May 17, 2018 10:10 AM CDT   Return Discharge with JAKI White CNP   [facility] ([facility])    [address] [place] [id number]   [phone] OPT 2              Additional Services     Follow-Up with Cardiologist           Follow-Up with Cardiac Advanced Practice Provider                 Pending Results     No orders found from 4/14/2018 to 4/17/2018.            Statement of Approval     Ordered          04/18/18 1555  I have reviewed and agree with all the  "recommendations and orders detailed in this document.  EFFECTIVE NOW     Approved and electronically signed by:  Anthony Cruz MD             Admission Information     Date & Time Provider Department Dept. Phone    4/16/2018 Anthony Cruz MD [facility] [phone]      Your Vitals Were     Blood Pressure Pulse Temperature Respirations Height Weight    132/70 (BP Location: Left arm) 78 97.8  F (36.6  C) (Oral) 20 1.803 m (5' 11\") 93.4 kg (206 lb)    Pulse Oximetry BMI (Body Mass Index)                98% 28.73 kg/m2          MyChart Information     [employer] gives you secure access to your electronic health record. If you see a primary care provider, you can also send messages to your care team and make appointments. If you have questions, please call your primary care clinic.  If you do not have a primary care provider, please call [phone] and they will assist you.        Care EveryWhere ID     This is your Care EveryWhere ID. This could be used by other organizations to access your [facility] medical records  [id number]        Equal Access to Services     [facility] Center[facility]ANISLAV : Hadhoang Jensen, maty reeder, suhail nance. So [facility] [phone].    ATENCIÓN: Si habla español, tiene a cooley disposición servicios gratuitos de asistencia lingüística. Iftikhar al [phone].    We comply with applicable federal civil rights laws and [place] laws. We do not discriminate on the basis of race, color, national origin, age, disability, sex, sexual orientation, or gender identity.               Review of your medicines      CONTINUE these medicines which have NOT CHANGED        Dose / Directions    apixaban ANTICOAGULANT 5 MG tablet   Commonly known as:  ELIQUIS   Used for:  Typical atrial flutter (H)        Dose:  5 mg   Take 1 tablet (5 mg) by mouth 2 times daily   Quantity:  60 tablet   Refills:  11       blood " glucose monitoring test strip   Commonly known as:  ONETOUCH ULTRA   Used for:  Type 2 diabetes mellitus without complication, with long-term current use of insulin (H)        Test twice daily with One Touch Ultra Blue   Quantity:  200 each   Refills:  3       cyanocobalamin 1000 MCG tablet   Commonly known as:  vitamin  B-12        Dose:  5000 mcg   Take 5,000 mcg by mouth daily.   Refills:  0       ferrous fumarate 65 mg ([place]. FE)-Vitamin C 125 mg  MG Tabs tablet   Commonly known as:  VITRON-C   Used for:  Iron deficiency anemia, unspecified iron deficiency anemia type        1 tab twice a day for anemia   Quantity:  60 tablet   Refills:  11       fluticasone 50 MCG/ACT spray   Commonly known as:  FLONASE   Used for:  Chronic allergic rhinitis, unspecified seasonality, unspecified trigger        USE ONE TO TWO SPRAYS IN EACH NOSTRIL EVERY DAY   Quantity:  48 g   Refills:  3       gemfibrozil 600 MG tablet   Commonly known as:  LOPID   Used for:  Hyperlipidemia LDL goal <70        TAKE ONE TABLET BY MOUTH TWICE A DAY   Quantity:  180 tablet   Refills:  1       glucose 4 g Chew chewable tablet   Used for:  Type II or unspecified type diabetes mellitus without mention of complication, not stated as uncontrolled        as directed   Quantity:  30   Refills:  11       insulin aspart 100 UNIT/ML injection   Commonly known as:  NovoLOG FLEXPEN   Used for:  Type 2 diabetes mellitus without complication, with long-term current use of insulin (H)        Inject 3 times daily (before meals). 2 units per carb choice (approx 5-10 units/meal)   Quantity:  30 mL   Refills:  3       insulin detemir 100 UNIT/ML injection   Commonly known as:  LEVEMIR FLEXPEN/FLEXTOUCH   Used for:  Type 2 diabetes mellitus without complication, with long-term current use of insulin (H)        Inject 40-45 Units Subcutaneous At Bedtime   Quantity:  45 mL   Refills:  3       insulin pen needle 30G X 8 MM   Commonly known as:  NOVOFINE 30   Used  for:  Type 2 diabetes mellitus without complication, with long-term current use of insulin (H)        Dose:  1 Syringe   1 Syringe 4 times daily   Quantity:  400 each   Refills:  3       levalbuterol 1.25 MG/3ML neb solution   Commonly known as:  XOPENEX   Used for:  Wheezing, SOB (shortness of breath)        Dose:  1 ampule   Take 3 mLs (1.25 mg) by nebulization once for 1 dose   Quantity:  3 mL   Refills:  0       levothyroxine 125 MCG tablet   Commonly known as:  SYNTHROID/LEVOTHROID   Used for:  Hypothyroidism, unspecified type        TAKE ONE TABLET BY MOUTH EVERY DAY   Quantity:  90 tablet   Refills:  3       lisinopril 20 MG tablet   Commonly known as:  PRINIVIL/ZESTRIL   Used for:  Essential hypertension, benign        TAKE ONE TABLET BY MOUTH EVERY DAY   Quantity:  90 tablet   Refills:  3       loratadine 10 MG tablet   Commonly known as:  CLARITIN   Used for:  Chronic allergic rhinitis, unspecified seasonality, unspecified trigger        TAKE ONE TABLET BY MOUTH EVERY DAY   Quantity:  90 tablet   Refills:  3       metFORMIN 1000 MG tablet   Commonly known as:  GLUCOPHAGE   Used for:  Type 2 diabetes mellitus without complication (H)        TAKE ONE TABLET BY MOUTH TWO TIMES A DAY WITH MEALS   Quantity:  60 tablet   Refills:  4       metoprolol tartrate 50 MG tablet   Commonly known as:  LOPRESSOR   Used for:  Essential hypertension, benign        TAKE ONE TABLET BY MOUTH TWICE A DAY   Quantity:  180 tablet   Refills:  3       nitroGLYcerin 0.4 MG sublingual tablet   Commonly known as:  NITROSTAT   Used for:  Unstable angina (H)        Dose:  0.4 mg   Place 1 tablet (0.4 mg) under the tongue every 5 minutes as needed for chest pain   Quantity:  25 tablet   Refills:  1       ONETOUCH DELICA LANCETS 33G Misc   Used for:  Type 2 diabetes, HbA1C goal < 8% (H)        Dose:  1 Device   1 Device 3 times daily . Use to test blood sugars 3 times daily or as directed.   Quantity:  300 each   Refills:  prn       order  "for DME   Used for:  Testosterone deficiency        Equipment being ordered: 3ml syringes and 22G 1 1/2\" needles to use with testosterone inj every 2 weeks   Quantity:  30 Device   Refills:  1       pravastatin 80 MG tablet   Commonly known as:  PRAVACHOL   Used for:  Hyperlipidemia LDL goal <70        TAKE ONE TABLET BY MOUTH EVERY NIGHT AT BEDTIME   Quantity:  90 tablet   Refills:  1       ranitidine 150 MG tablet   Commonly known as:  ZANTAC   Used for:  Gastroesophageal reflux disease without esophagitis        TAKE ONE TABLET BY MOUTH TWICE A DAY   Quantity:  180 tablet   Refills:  3       testosterone cypionate 200 MG/ML injection   Commonly known as:  DEPOTESTOTERONE   Used for:  Testosterone deficiency   Notes to Patient:  Resume prior to admission schedule.        Dose:  300 mg   Inject 1.5 mLs (300 mg) into the muscle every 14 days   Quantity:  10 vial   Refills:  1       VITAMIN D3 PO        Dose:  5000 Units   Take 5,000 Units by mouth daily   Refills:  0         STOP taking     aspirin 325 MG tablet                    Protect others around you: Learn how to safely use, store and throw away your medicines at www.disposemymeds.org.             Medication List: This is a list of all your medications and when to take them. Check marks below indicate your daily home schedule. Keep this list as a reference.      Medications           Morning Afternoon Evening Bedtime As Needed    apixaban ANTICOAGULANT 5 MG tablet   Commonly known as:  ELIQUIS   Take 1 tablet (5 mg) by mouth 2 times daily   Last time this was given:  5 mg on 4/18/2018 11:59 AM   Next Dose Due:  4/18/18 PM                                      blood glucose monitoring test strip   Commonly known as:  ONETOUCH ULTRA   Test twice daily with One Touch Ultra Blue                                cyanocobalamin 1000 MCG tablet   Commonly known as:  vitamin  B-12   Take 5,000 mcg by mouth daily.   Last time this was given:  5,000 mcg on 4/18/2018  9:41 " AM   Next Dose Due:  4/19/18 AM                                   ferrous fumarate 65 mg ([place]. FE)-Vitamin C 125 mg  MG Tabs tablet   Commonly known as:  VITRON-C   1 tab twice a day for anemia   Last time this was given:  1 tablet on 4/18/2018  9:42 AM   Next Dose Due:  4/19/18 AM                                   fluticasone 50 MCG/ACT spray   Commonly known as:  FLONASE   USE ONE TO TWO SPRAYS IN EACH NOSTRIL EVERY DAY   Last time this was given:  1 spray on 4/17/2018  8:14 PM   Next Dose Due:  4/18/18 PM                                   gemfibrozil 600 MG tablet   Commonly known as:  LOPID   TAKE ONE TABLET BY MOUTH TWICE A DAY   Last time this was given:  600 mg on 4/18/2018  9:42 AM   Next Dose Due:  4/18/18 PM                                      glucose 4 g Chew chewable tablet   as directed                                insulin aspart 100 UNIT/ML injection   Commonly known as:  NovoLOG FLEXPEN   Inject 3 times daily (before meals). 2 units per carb choice (approx 5-10 units/meal)   Last time this was given:  10 Units on 4/17/2018  6:15 PM                                         insulin detemir 100 UNIT/ML injection   Commonly known as:  LEVEMIR FLEXPEN/FLEXTOUCH   Inject 40-45 Units Subcutaneous At Bedtime   Last time this was given:  35 Units on 4/17/2018 10:44 PM                                   insulin pen needle 30G X 8 MM   Commonly known as:  NOVOFINE 30   1 Syringe 4 times daily                                levalbuterol 1.25 MG/3ML neb solution   Commonly known as:  XOPENEX   Take 3 mLs (1.25 mg) by nebulization once for 1 dose                                levothyroxine 125 MCG tablet   Commonly known as:  SYNTHROID/LEVOTHROID   TAKE ONE TABLET BY MOUTH EVERY DAY   Last time this was given:  125 mcg on 4/18/2018  9:42 AM   Next Dose Due:  4/18/18 AM                                   lisinopril 20 MG tablet   Commonly known as:  PRINIVIL/ZESTRIL   TAKE ONE TABLET BY MOUTH EVERY DAY  "  Last time this was given:  20 mg on 4/18/2018  9:42 AM   Next Dose Due:  4/19/18 AM                                   loratadine 10 MG tablet   Commonly known as:  CLARITIN   TAKE ONE TABLET BY MOUTH EVERY DAY   Last time this was given:  10 mg on 4/18/2018  9:42 AM   Next Dose Due:  4/18/18 AM                                metFORMIN 1000 MG tablet   Commonly known as:  GLUCOPHAGE   TAKE ONE TABLET BY MOUTH TWO TIMES A DAY WITH MEALS   Next Dose Due:  4/18/18 PM                                      metoprolol tartrate 50 MG tablet   Commonly known as:  LOPRESSOR   TAKE ONE TABLET BY MOUTH TWICE A DAY   Last time this was given:  50 mg on 4/18/2018  9:42 AM   Next Dose Due:  4/18/18 PM                                      nitroGLYcerin 0.4 MG sublingual tablet   Commonly known as:  NITROSTAT   Place 1 tablet (0.4 mg) under the tongue every 5 minutes as needed for chest pain                                   ONETOUCH DELICA LANCETS 33G Misc   1 Device 3 times daily . Use to test blood sugars 3 times daily or as directed.                                order for DME   Equipment being ordered: 3ml syringes and 22G 1 1/2\" needles to use with testosterone inj every 2 weeks                                pravastatin 80 MG tablet   Commonly known as:  PRAVACHOL   TAKE ONE TABLET BY MOUTH EVERY NIGHT AT BEDTIME   Last time this was given:  80 mg on 4/17/2018  8:14 PM   Next Dose Due:  4/18/18 PM                                   ranitidine 150 MG tablet   Commonly known as:  ZANTAC   TAKE ONE TABLET BY MOUTH TWICE A DAY   Last time this was given:  150 mg on 4/18/2018  9:42 AM   Next Dose Due:  4/18/18 PM                                      testosterone cypionate 200 MG/ML injection   Commonly known as:  DEPOTESTOTERONE   Inject 1.5 mLs (300 mg) into the muscle every 14 days   Notes to Patient:  Resume prior to admission schedule.                                VITAMIN D3 PO   Take 5,000 Units by mouth daily   Last time " this was given:  5,000 Units on 4/18/2018  9:42 AM   Next Dose Due:  418/18 AM

## 2018-04-16 NOTE — ED NOTES
Bed: ED27  Expected date:   Expected time:   Means of arrival:   Comments:  altaf - 518 - 77 M abnormal labs eta 1326

## 2018-04-16 NOTE — IP AVS SNAPSHOT
[facility]    [address] [place] [address]., [address]    [place] [address]    Phone:  [phone]                                       After Visit Summary   4/16/2018    Pasucal Perea    MRN: [id number]           After Visit Summary Signature Page     I have received my discharge instructions, and my questions have been answered. I have discussed any challenges I see with this plan with the nurse or doctor.    ..........................................................................................................................................  Patient/Patient Representative Signature      ..........................................................................................................................................  Patient Representative Print Name and Relationship to Patient    ..................................................               ................................................  Date                                            Time    ..........................................................................................................................................  Reviewed by Signature/Title    ...................................................              ..............................................  Date                                                            Time

## 2018-04-16 NOTE — PHARMACY-ADMISSION MEDICATION HISTORY
Admission Medication History    Admission medication history interview status for the 4/16/2018 admission is complete. See EPIC admission navigator for prior to admission medications     Medication history source reliability:Good    Actions taken by pharmacist (provider contacted, etc):None     Additional medication history information not noted on PTA med list :None    Medication reconciliation/reorder completed by provider prior to medication history? No    Time spent in this activity: 15 minutes    Prior to Admission medications    Medication Sig Last Dose Taking? Auth Provider   levalbuterol (XOPENEX) 1.25 MG/3ML neb solution Take 3 mLs (1.25 mg) by nebulization once for 1 dose 4/16/2018 at ONCE at clinic Yes Blake Rivas PA-C   aspirin 325 MG tablet Take 1 tablet (325 mg) by mouth daily 4/16/2018 at ONCE at clinic Yes Blake Rivas PA-C   loratadine (CLARITIN) 10 MG tablet TAKE ONE TABLET BY MOUTH EVERY DAY 4/15/2018 at AM Yes Andrew Elizalde MD   lisinopril (PRINIVIL/ZESTRIL) 20 MG tablet TAKE ONE TABLET BY MOUTH EVERY DAY 4/15/2018 at AM Yes Andrew Elizalde MD   metoprolol tartrate (LOPRESSOR) 50 MG tablet TAKE ONE TABLET BY MOUTH TWICE A DAY 4/15/2018 at PM Yes Andrew Elizalde MD   metFORMIN (GLUCOPHAGE) 1000 MG tablet TAKE ONE TABLET BY MOUTH TWO TIMES A DAY WITH MEALS 4/15/2018 at Dinner Yes Andrew Elizalde MD   levothyroxine (SYNTHROID/LEVOTHROID) 125 MCG tablet TAKE ONE TABLET BY MOUTH EVERY DAY 4/15/2018 at AM Yes Andrew Elizalde MD   gemfibrozil (LOPID) 600 MG tablet TAKE ONE TABLET BY MOUTH TWICE A DAY 4/15/2018 at PM Yes Andrew Elizalde MD   pravastatin (PRAVACHOL) 80 MG tablet TAKE ONE TABLET BY MOUTH EVERY NIGHT AT BEDTIME 4/15/2018 at PM Yes Andrew Elizalde MD   fluticasone (FLONASE) 50 MCG/ACT spray USE ONE TO TWO SPRAYS IN EACH NOSTRIL EVERY DAY 4/15/2018 at PM Yes Andrew Elizalde MD   ranitidine (ZANTAC) 150 MG tablet TAKE ONE TABLET BY MOUTH TWICE A DAY 4/15/2018 at PM Andrew Flores MD  "  testosterone cypionate (DEPOTESTOTERONE) 200 MG/ML injection Inject 1.5 mLs (300 mg) into the muscle every 14 days 4/9/2018 Yes Andrew Elizalde MD   insulin aspart (NOVOLOG FLEXPEN) 100 UNIT/ML injection Inject 3 times daily (before meals). 2 units per carb choice (approx 5-10 units/meal) 4/15/2018 at Unknown time Yes Andrew Elizalde MD   apixaban ANTICOAGULANT (ELIQUIS) 5 MG tablet Take 1 tablet (5 mg) by mouth 2 times daily 4/15/2018 at PM Yes Andrew Elizalde MD   ferrous fumarate 65 mg, [place]. FE,-Vitamin C 125 mg (VITRON-C)  MG TABS tablet 1 tab twice a day for anemia 4/15/2018 at PM Yes Andrew Elizalde MD   nitroglycerin (NITROSTAT) 0.4 MG SL tablet Place 1 tablet (0.4 mg) under the tongue every 5 minutes as needed for chest pain  Yes Speedy Olivas MD   Cholecalciferol (VITAMIN D3 PO) Take 5,000 Units by mouth daily 4/15/2018 at AM Yes Unknown, Entered By History   cyanocolbalamin (VITAMIN  B-12) 1000 MCG tablet Take 5,000 mcg by mouth daily. 4/15/2018 at AM Yes Andrew Elizalde MD   GLUCOSE 4 GM OR CHEW as directed  at PRN Yes Andrew Elizalde MD   insulin detemir (LEVEMIR FLEXPEN/FLEXTOUCH) 100 UNIT/ML injection Inject 40-45 Units Subcutaneous At Bedtime   Andrew Elizalde MD   order for DME Equipment being ordered: 3ml syringes and 22G 1 1/2\" needles to use with testosterone inj every 2 weeks   Andrew Elizalde MD   insulin pen needle (NOVOFINE 30) 30G X 8 MM 1 Syringe 4 times daily   Andrew Elizalde MD   blood glucose monitoring (ONE TOUCH ULTRA) test strip Test twice daily with One Touch Ultra Blue   Andrew Elizalde MD   ONETOUCH DELICA LANCETS 33G MISC 1 Device 3 times daily . Use to test blood sugars 3 times daily or as directed.   Andrew Elizalde MD David S. Cline, PharmD      "

## 2018-04-16 NOTE — ED PROVIDER NOTES
History     Chief Complaint:  Chest pain    HPI   Pascual Perea is a 77 year old male who presents with chest pain.  The patient says that Tuesday of last week he went into the clinic for cough, wheezing, and symptoms relating to a head cold.  At the clinic, an EKG was done showing ST depression, and labs show troponin elevated.  Patient only has chest pain when coughing. No chest pain or pressure at any other time. Does have sob when walking over past few days along with coughing. No fever. Of note, the patient takes Eliquis for atrial flutter and has been taking regularly. Patient has no history of COPD or blood clots.  He was given aspirin by EMS.    Allergies:  Omeprazole    Medications:    levalbuterol (XOPENEX)  loratadine (CLARITIN)   lisinopril (PRINIVIL/ZESTRIL)   metoprolol tartrate (LOPRESSOR)   metFORMIN (GLUCOPHAGE)   levothyroxine (SYNTHROID/LEVOTHROID)   gemfibrozil (LOPID)   pravastatin (PRAVACHOL)   fluticasone (FLONASE)   ranitidine (ZANTAC)   insulin detemir (LEVEMIR FLEXPEN/FLEXTOUCH)   testosterone cypionate (DEPOTESTOTERONE)   insulin aspart (NOVOLOG FLEXPEN)   apixaban ANTICOAGULANT (ELIQUIS)   aspirin   nitroglycerin (NITROSTAT)      Past Medical History:    Adhesive capsulitis of shoulder   Anemia   Anemia, unspecified   Antiplatelet or antithrombotic long-term use   Arrhythmia   CAD (coronary artery disease)   Hyperlipidemia    Hypertension   Hypothyroidism   Impotence of organic origin   Laceration of finger   Numbness and tingling   BRANDON (obstructive sleep apnea)   Osteopenia   Pulmonary hypertension   Sensorineural hearing loss, unspecified   Stented coronary artery   Testosterone deficiency   Type 2 diabetes mellitus without complication    Typical atrial flutter    Unspecified hypothyroidism   Vitamin D deficiency   Atrial flutter  GERD    Past Surgical History:    CABG  Stress Echo  Cardiac surgery -bypass  Colonoscopy  Coronary angiogram  Excise mass head  Heart  "cath  Phacoemulsification clear cornea with standard intraocular lens implant  Septoplasty, turbinoplasty      Family History:    Genitourinary problems  Hypertension  Diabetes  Heart disease  MI    Social History:  Smoking Status: Former Smoker  Alcohol Use: No  Patient presents with wife.  Marital Status:   [2]     Review of Systems   Constitutional: Negative for fever.   Respiratory: Positive for cough and shortness of breath.    Cardiovascular: Positive for chest pain.   Gastrointestinal: Negative for abdominal pain, nausea and vomiting.   Musculoskeletal:        No leg swelling   All other systems reviewed and are negative.    Physical Exam     Patient Vitals for the past 24 hrs:   BP Temp Temp src Pulse Resp SpO2 Height Weight   04/16/18 1416 - - - - - 98 % - -   04/16/18 1415 - - - - - 99 % - -   04/16/18 1414 - - - - - 96 % - -   04/16/18 1332 147/76 99.6  F (37.6  C) Oral 87 24 95 % 1.803 m (5' 11\") 96.6 kg (213 lb)     Physical Exam  General: Resting comfortably on the gurney  Eyes:  The pupils are equal and round    Conjunctivae and sclerae are normal  ENT:    Moist mucous membranes  Neck:  Normal range of motion  CV:  Irregular rate and rhythm    Skin warm and well perfused   Resp:  Lungs are clear    Non-labored    Productive sounding cough heard    No rales    Wheezing bilaterally  GI:  Abdomen is soft, there is no rigidity    No distension    No rebound tenderness     No abdominal tenderness  MS:  Normal muscular tone  Skin:  No rash or acute skin lesions noted  Neuro:   Awake, alert.      Speech is normal and fluent.    Face is symmetric.     Moves all extremities  Psych: Normal affect.  Appropriate interactions.    Emergency Department Course   ECG:  ECG (13:35:48):  Rate 88 bpm. NV interval *. QRS duration 90. QT/QTc 350/423. P-R-T axes * 47 138.  Undetermined rhythm, T-wave abnormality, consider lateral ischemia, abnormal ECG.  Interpreted at 1343 by Madhuri Pryor, " MD.    Laboratory:  Influenza A/B antigen: neg  BNP: 1793    Interventions:  1413 Albuterol 3ml nebulization    Emergency Department Course:  Nursing notes and vitals reviewed. 1358 I performed an exam of the patient as documented above.     IV inserted. Medicine administered as documented above. Blood drawn. This was sent to the lab for further testing, results above.  Patient had EKG, results above    1510  I consulted with Dr. Khan of the hospitalist services. They are in agreement to accept the patient for admission.    Findings and plan explained to the Patient who consents to admission. Discussed the patient with Dr. Khan, who will admit the patient to a [facility] bed for further monitoring, evaluation, and treatment.    Impression & Plan      Medical Decision Making:  Patient is a 77-year-old male who presents to the ED with chest pain.  Vital signs noted to be within normal limits though temperature is slightly elevated at 99.6.  Reviewed laboratory values and chest x-ray that were obtained at clinic were reviewed. His EKG in the emergency department shows atrial fibrillation with ST changes including T wave inversion in lateral leads.  These ST changes were actually present on prior EKG and appear similar.  Chest x-ray was clear in clinic.  He has some wheezing on exam and I suspect bronchitis given clinical hiestory.  Suspect demand ischemia as cause of elevated troponin given that he is not having any chest pressure/chest pain and given his clinical presentation. Will hold off on heparin. Given the elevated troponin level will have him come into the hospital for additional workup and discussed with hospitalist for admission. Given aspirin by EMS.    Diagnosis:    ICD-10-CM   1. Acute bronchitis, unspecified organism J20.9       2. Elevated troponin R74.8   3. Atrial fibrillation, unspecified type (H) I48.91     Disposition:  Admitted to [facility] bed.    Ethan Arcos  4/16/2018    EMERGENCY DEPARTMENT  I,  Ethan Arcos, am serving as a scribe at 1:58 PM on 4/16/2018 to document services personally performed by Madhuri Pryor MD based on my observations and the provider's statements to me.        Madhuri Pryor MD  04/16/18 1554

## 2018-04-16 NOTE — PLAN OF CARE
Problem: Patient Care Overview  Goal: Plan of Care/Patient Progress Review  Outcome: No Change  Pt AO, VSS on RA, slightly hypertensive. Denies chest pain. Frequent cough. Tele A fib with CVR. Trops 0.48, 0.52. MD paged about change. Pt to start on Heparin gtt - Eliquis dc'nicole. Cards consult ordered. . Echo ordered. Up SBA. Continue to monitor.    Addendum: Heparin gtt started at 1000 unit/hr, got bolus of 2500 units. 10a recheck scheduled for 0030 tomorrow.

## 2018-04-17 PROBLEM — R06.02 SHORTNESS OF BREATH: Status: ACTIVE | Noted: 2018-04-17

## 2018-04-17 LAB
APTT PPP: 52 SEC (ref 22–37)
APTT PPP: 59 SEC (ref 22–37)
GLUCOSE BLDC GLUCOMTR-MCNC: 104 MG/DL (ref 70–99)
GLUCOSE BLDC GLUCOMTR-MCNC: 114 MG/DL (ref 70–99)
GLUCOSE BLDC GLUCOMTR-MCNC: 123 MG/DL (ref 70–99)
GLUCOSE BLDC GLUCOMTR-MCNC: 135 MG/DL (ref 70–99)
LMWH PPP CHRO-ACNC: 0.45 IU/ML
LMWH PPP CHRO-ACNC: 0.6 IU/ML
LMWH PPP CHRO-ACNC: 0.71 IU/ML
PLATELET # BLD AUTO: 142 10E9/L (ref 150–450)
TROPONIN I SERPL-MCNC: 0.05 UG/L (ref 0–0.04)

## 2018-04-17 PROCEDURE — 25000131 ZZH RX MED GY IP 250 OP 636 PS 637: Mod: GY | Performed by: INTERNAL MEDICINE

## 2018-04-17 PROCEDURE — 25000128 H RX IP 250 OP 636: Performed by: INTERNAL MEDICINE

## 2018-04-17 PROCEDURE — A9270 NON-COVERED ITEM OR SERVICE: HCPCS | Mod: GY | Performed by: INTERNAL MEDICINE

## 2018-04-17 PROCEDURE — 25000131 ZZH RX MED GY IP 250 OP 636 PS 637: Mod: GY

## 2018-04-17 PROCEDURE — 25000132 ZZH RX MED GY IP 250 OP 250 PS 637: Mod: GY | Performed by: HOSPITALIST

## 2018-04-17 PROCEDURE — 99207 ZZC CDG-CODE CATEGORY CHANGED: CPT | Performed by: HOSPITALIST

## 2018-04-17 PROCEDURE — 96375 TX/PRO/DX INJ NEW DRUG ADDON: CPT

## 2018-04-17 PROCEDURE — 00000146 ZZHCL STATISTIC GLUCOSE BY METER IP

## 2018-04-17 PROCEDURE — 36415 COLL VENOUS BLD VENIPUNCTURE: CPT | Performed by: INTERNAL MEDICINE

## 2018-04-17 PROCEDURE — 96372 THER/PROPH/DIAG INJ SC/IM: CPT

## 2018-04-17 PROCEDURE — 85520 HEPARIN ASSAY: CPT | Performed by: INTERNAL MEDICINE

## 2018-04-17 PROCEDURE — 96376 TX/PRO/DX INJ SAME DRUG ADON: CPT

## 2018-04-17 PROCEDURE — 25000132 ZZH RX MED GY IP 250 OP 250 PS 637: Mod: GY | Performed by: INTERNAL MEDICINE

## 2018-04-17 PROCEDURE — 85730 THROMBOPLASTIN TIME PARTIAL: CPT | Performed by: INTERNAL MEDICINE

## 2018-04-17 PROCEDURE — 99221 1ST HOSP IP/OBS SF/LOW 40: CPT | Performed by: INTERNAL MEDICINE

## 2018-04-17 PROCEDURE — 84484 ASSAY OF TROPONIN QUANT: CPT | Performed by: INTERNAL MEDICINE

## 2018-04-17 PROCEDURE — G0378 HOSPITAL OBSERVATION PER HR: HCPCS

## 2018-04-17 PROCEDURE — A9270 NON-COVERED ITEM OR SERVICE: HCPCS | Mod: GY | Performed by: HOSPITALIST

## 2018-04-17 PROCEDURE — 85049 AUTOMATED PLATELET COUNT: CPT | Performed by: INTERNAL MEDICINE

## 2018-04-17 PROCEDURE — 99226 ZZC SUBSEQUENT OBSERVATION CARE,LEVEL III: CPT | Performed by: HOSPITALIST

## 2018-04-17 RX ORDER — GUAIFENESIN/DEXTROMETHORPHAN 100-10MG/5
5 SYRUP ORAL EVERY 4 HOURS PRN
Status: DISCONTINUED | OUTPATIENT
Start: 2018-04-17 | End: 2018-04-18 | Stop reason: HOSPADM

## 2018-04-17 RX ORDER — DIPHENHYDRAMINE HYDROCHLORIDE AND LIDOCAINE HYDROCHLORIDE AND ALUMINUM HYDROXIDE AND MAGNESIUM HYDRO
10 KIT EVERY 6 HOURS PRN
Status: DISCONTINUED | OUTPATIENT
Start: 2018-04-17 | End: 2018-04-18 | Stop reason: HOSPADM

## 2018-04-17 RX ADMIN — METOPROLOL TARTRATE 50 MG: 50 TABLET ORAL at 22:43

## 2018-04-17 RX ADMIN — LORATADINE 10 MG: 10 TABLET ORAL at 07:57

## 2018-04-17 RX ADMIN — GEMFIBROZIL 600 MG: 600 TABLET ORAL at 15:46

## 2018-04-17 RX ADMIN — HEPARIN SODIUM 1000 UNITS/HR: 10000 INJECTION, SOLUTION INTRAVENOUS at 15:46

## 2018-04-17 RX ADMIN — GEMFIBROZIL 600 MG: 600 TABLET ORAL at 06:48

## 2018-04-17 RX ADMIN — Medication 1 TABLET: at 22:44

## 2018-04-17 RX ADMIN — RANITIDINE 150 MG: 150 TABLET ORAL at 07:58

## 2018-04-17 RX ADMIN — FLUTICASONE PROPIONATE 1 SPRAY: 50 SPRAY, METERED NASAL at 20:14

## 2018-04-17 RX ADMIN — CYANOCOBALAMIN TAB 1000 MCG 5000 MCG: 1000 TAB at 07:58

## 2018-04-17 RX ADMIN — GUAIFENESIN AND DEXTROMETHORPHAN 5 ML: 100; 10 SYRUP ORAL at 10:03

## 2018-04-17 RX ADMIN — METOPROLOL TARTRATE 50 MG: 50 TABLET ORAL at 07:58

## 2018-04-17 RX ADMIN — LISINOPRIL 20 MG: 20 TABLET ORAL at 07:58

## 2018-04-17 RX ADMIN — RANITIDINE 150 MG: 150 TABLET ORAL at 22:44

## 2018-04-17 RX ADMIN — LEVOTHYROXINE SODIUM 125 MCG: 125 TABLET ORAL at 07:58

## 2018-04-17 RX ADMIN — Medication 1 TABLET: at 07:58

## 2018-04-17 RX ADMIN — PRAVASTATIN SODIUM 80 MG: 40 TABLET ORAL at 20:14

## 2018-04-17 RX ADMIN — CHOLECALCIFEROL CAP 125 MCG (5000 UNIT) 5000 UNITS: 125 CAP at 07:59

## 2018-04-17 RX ADMIN — INSULIN ASPART 10 UNITS: 100 INJECTION, SOLUTION INTRAVENOUS; SUBCUTANEOUS at 18:15

## 2018-04-17 RX ADMIN — INSULIN DETEMIR 35 UNITS: 100 INJECTION, SOLUTION SUBCUTANEOUS at 22:44

## 2018-04-17 NOTE — PLAN OF CARE
Problem: Patient Care Overview  Goal: Plan of Care/Patient Progress Review  OT/CR: Orders rec'd and chart reviewed. Pt admitted due to chest pain and has elevated troponins, cardiology ordered, will wait for cardiology consult and POC prior to OT/CR.

## 2018-04-17 NOTE — CONSULTS
Patient seen: symptoms and exam consistent with respiratory illness.  No angina by history despite CAD history.  Troponin has fallen to 0.052 so have recommended lexiscan stress for tomorrow.  Thank you.

## 2018-04-17 NOTE — PROGRESS NOTES
"\"What Is Outpatient Observation\" brochure was given & explained to the patient and spouse. Questions answered. Patient verbalized understanding.    \"Medicare Outpatient Observation Notice\" brochure was given & explained to the patient and spouse. Patient verbalized understanding and denies any questions at this time.    "

## 2018-04-17 NOTE — PLAN OF CARE
Problem: Patient Care Overview  Goal: Plan of Care/Patient Progress Review  Outcome: Improving  Pt AO, VSS on RA. Denies pain. Frequent cough. Robitussin x 1. Tele NSR (no longer in A fib). Cards saw today - awaiting results of echo. Trops trending down, last was 0.052. Heparin gtt still at 10 ml/hr. BGS stable. Kept NPO for the morning due to cards not having seen yet. Consistent Carb diet this afternoon. Plan for Lexiscan tomorrow. Placed new IV. Up SBA. Continue to monitor.    Addendum: Heparin gtt to continue at 10ml/hr - recheck 10a and PTT tomorrow am. No other complaints.

## 2018-04-17 NOTE — PROGRESS NOTES
[facility]  Hospitalist Progress Note   04/17/2018          Assessment and Plan:       Pascual Perea is  77-year-old male with a history of type 2 diabetes, coronary artery disease, hypertension, hyperlipidemia admitted on 4/16/2018 for cough with shortness of breath.    Positive troponin likely in the setting of demand from URI/rule out acute coronary syndrome.  Dyspnea on exertion  history of coronary artery disease  Patient went to the [facility] on 04/16 with shortness of breath and some chest tightness and cough.  EKG showed A Fib with some T-wave inversions laterally, fairly similar to prior, but there is also slight ST depression in V6. Troponin of 0.048, sent to the ED. Chest x-ray negative for any infiltrate.   N-terminal pro BNP 1793  Patient really does not have classical chest pain, however,  has some substernal burning when he is coughing.  Troponin 0.048 > 0.052 >0.063    Echocardiogram 05/2016, showed left ventricular normal size, ejection fraction of 55-60%, mild pulmonary hypertension.  Nuclear medicine stress test showed a very small fixed apical defect, which may represent a nontransmural infarct, no wall motion abnormalities and an ejection fraction of 61%.    Followed by Dr. Olivas who last saw the patient in 2017, with plans to see back in 1 year.    Echocardiogram pending  Continue telemetry monitoring.  Continue PTA aspirin 325 mg daily  Continue PTA pravastatin 80 mg at bedtime.  Continue PTA metoprolol 50 mg two times a day.  Patient has elevated Pro BNP, no crackles with trace ankle edema. Will hold off diuresis until echocardiogram evaluation.  Cardiology evaluation requested.    Viral URI  patient states has been having ongoing cough, nasal discomfort for a few days now.  WBC count 4.5, influenza A and B negative, chest x-ray no acute infiltrates  supportive care with adequate oral hydration, frequent handwashing.  Normal saline nasal drops, lozenges  Continue PTA  PRN loratadine.    Atrial fibrillation on chronic anticoagulation  patient currently in a fib with rate control.  Denies any palpitations at this time.  Continue PTA Apixiban 5 mg by mouth two times daily.  Continue PTA metoprolol 50 mg two times a day.  Has an appointment with Dr. Jarvis this coming Monday to follow up on his atrial fibrillation.       Hypertension  Continue PTA lisinopril 20 mg oral daily.  Continue PTA metoprolol 50 mg two times a day.    Diabetes mellitus   Hemoglobin A1c 6.9  Hold PTA metformin thousand milligrams two times a day.  Continue PTA insulin as part two units per carbohydrate choice three times daily.  PTA insulin detemir 40 to 45 units at bedtime. Lowered to 35 units during hospitalization  Continue insulin sliding scale.  Monitor blood sugars closely.    Hypothyroidism  Continue PTA levothyroxine 125  g daily.  TSH in February 2018 0.41    Hyperlipidemia  prior lipid panel in 2017 per goal  Continue PTA gemfibrozil 600 mg daily, pravastatin 80 mg tablet daily.    GERD  Continue  mg ranitidine BID.    History of testosterone deficiency.   Patient receives testosterone injections every 14 days.  Follow-up as outpatient.    History of mild pulmonary hypertension seen on echocardiogram 2016.   History of obstructive sleep apnea not on CPAP.   Obesity with a BMI of 29.18  consider lifestyle modification with diet and exercise.  Consider sleep studies as outpatient.    History of vitamin D deficiency.   PTA On vitamin D replacement therapy with 5000 units daily.    # Pain Assessment:  Current Pain Score 4/17/2018   Patient currently in pain? denies   Pain score (0-10) -   Pain location -   Pain descriptors -   Pascual qiu pain level was assessed and he currently denies pain.         Active Diet Order      Combination Diet [id number] Calories: Moderate Consistent CHO (4-6 CHO units/meal); No Caffeine for 24 hours (Once tests completed, may have caffeine)    DVT Prophylaxis:  pneumatic  compression device, heparin  Code Status: Full Code  Disposition: Expected discharge in 1 to 2 days pending clinical improvement    Patient, interdisciplinary team involved in care and agrees with plan.  Total time - Greater than 35 min. More than 50% of time spent in direct patient care, care coordination and formalizing plan of care.     Anthony Cruz MD        Interval History:      patient lying in bed. Continues to have cough. Nonproductive.  Complains of chest tightness, no shortness of breath or palpitations.  Afebrile since admission. Not on oxygen.       Physical Exam:        Physical Exam   Temp:  [98.1  F (36.7  C)-99.6  F (37.6  C)] 98.8  F (37.1  C)  Pulse:  [78-87] 78  Heart Rate:  [74-92] 74  Resp:  [22-36] 22  BP: (125-151)/(70-91) 130/79  SpO2:  [93 %-99 %] 93 %    Intake/Output Summary (Last 24 hours) at 04/17/18 0741  Last data filed at 04/17/18 0200   Gross per 24 hour   Intake                0 ml   Output              450 ml   Net             -450 ml     Admission Weight: 96.6 kg (213 lb)  Current Weight: 94.9 kg (209 lb 3.2 oz)    PHYSICAL EXAM  GENERAL: Patient is in no distress. Alert and oriented.  HEENT: Oropharynx pink, moist. Pupils equal  HEART: irregular rate and rhythm. S1S2. Systolic murmur   LUNGS: bilateral decreased breath sounds, faint wheezing all over lung fields.  Faint crackles lower lobe.  ABDOMEN: Soft, no abdominal tenderness, bowel sounds heard   NEURO: moving all extremities.  EXTREMITIES: Trace ankle edema. 2+ peripheral pulses.  SKIN: Warm, dry. No rash or bruising.  PSYCHIATRY Cooperative       Medications:          sodium chloride (PF)  3 mL Intracatheter Q8H     aspirin  325 mg Oral Daily     cholecalciferol (vitamin D3) capsule CAPS 5,000 Units  5,000 Units Oral Daily     cyanocobalamin  5,000 mcg Oral Daily     ferrous fumarate 65 mg ([place]. FE)-Vitamin C 125 mg  1 tablet Oral BID     fluticasone  1 spray Both Nostrils QPM     gemfibrozil  600 mg Oral BID AC      levothyroxine  125 mcg Oral Daily     lisinopril  20 mg Oral Daily     loratadine  10 mg Oral Daily     metoprolol tartrate  50 mg Oral BID     ranitidine  150 mg Oral BID     pravastatin  80 mg Oral QPM     insulin detemir  35 Units Subcutaneous At Bedtime     insulin aspart  1-3 Units Subcutaneous TID AC     insulin aspart  1-3 Units Subcutaneous At Bedtime     insulin aspart   Subcutaneous QAM AC     insulin aspart   Subcutaneous Daily with lunch     insulin aspart   Subcutaneous Daily with supper     lidocaine (buffered or not buffered), lidocaine 4%, sodium chloride (PF), - MEDICATION INSTRUCTIONS -, nitroGLYcerin, alum & mag hydroxide-simethicone, - MEDICATION INSTRUCTIONS -, ondansetron **OR** ondansetron, HOLD MEDICATION, acetaminophen, acetaminophen, naloxone, sodium chloride (PF), HOLD MEDICATION, HOLD MEDICATION, HOLD MEDICATION, HOLD MEDICATION, HOLD MEDICATION, - MEDICATION INSTRUCTIONS -, Reason ACE/ARB/ARNI order not selected, glucose **OR** dextrose **OR** glucagon, Reason anticoagulation order not selected, insulin aspart         Data:        Results for orders placed or performed during the hospital encounter of 04/16/18 (from the past 24 hour(s))   EKG 12 lead   Result Value Ref Range    Interpretation ECG Click View Image link to view waveform and result    BNP   Result Value Ref Range    N-Terminal Pro BNP Inpatient 1793 0 - 1800 pg/mL   Influenza A/B antigen   Result Value Ref Range    Influenza A/B Agn Specimen Nasal     Influenza A Negative NEG^Negative    Influenza B Negative NEG^Negative   Troponin I - Now then in 6 hours x 2   Result Value Ref Range    Troponin I ES 0.052 (H) 0.000 - 0.045 ug/L   Hemoglobin A1c   Result Value Ref Range    Hemoglobin A1C 6.9 (H) 0 - 6.4 %   CBC with platelets   Result Value Ref Range    WBC 4.5 4.0 - 11.0 10e9/L    RBC Count 4.85 4.4 - 5.9 10e12/L    Hemoglobin 14.2 13.3 - 17.7 g/dL    Hematocrit 42.4 40.0 - 53.0 %    MCV 87 78 - 100 fl    MCH 29.3 26.5 -  33.0 pg    MCHC 33.5 31.5 - 36.5 g/dL    RDW 14.3 10.0 - 15.0 %    Platelet Count 148 (L) 150 - 450 10e9/L   Glucose by meter   Result Value Ref Range    Glucose 105 (H) 70 - 99 mg/dL   Glucose by meter   Result Value Ref Range    Glucose 137 (H) 70 - 99 mg/dL   Troponin I - Now then in 6 hours x 2   Result Value Ref Range    Troponin I ES 0.063 (H) 0.000 - 0.045 ug/L   Heparin 10a Level   Result Value Ref Range    Heparin 10A Level 0.71 IU/mL   Partial thromboplastin time   Result Value Ref Range    PTT 59 (H) 22 - 37 sec   Heparin Xa (10a) Level   Result Value Ref Range    Heparin 10A Level 0.60 IU/mL   Platelet count   Result Value Ref Range    Platelet Count 142 (L) 150 - 450 10e9/L

## 2018-04-17 NOTE — CONSULTS
Mr. Perea is followed closely by Dr. Olivas and Dr. Jarvis.  Will recheck troponin and when falling, would do lexiscan stress nuclear study.  Have ordered repeat troponin.  If echo has changed, would then favor angiogram.  Thank you.

## 2018-04-17 NOTE — PLAN OF CARE
Problem: Breathing Pattern Ineffective (Adult)  Goal: Identify Related Risk Factors and Signs and Symptoms  Related risk factors and signs and symptoms are identified upon initiation of Human Response Clinical Practice Guideline (CPG).   A/O, Vitally stable except for RR of 22. LS coarse with crackles. Tele Afib c CVR. Plan to continue Heparin gtt as ordered, Cardiology to see and Echo today.

## 2018-04-18 ENCOUNTER — APPOINTMENT (OUTPATIENT)
Dept: NUCLEAR MEDICINE | Facility: CLINIC | Age: 78
End: 2018-04-18
Attending: INTERNAL MEDICINE
Payer: MEDICARE

## 2018-04-18 ENCOUNTER — APPOINTMENT (OUTPATIENT)
Dept: CARDIOLOGY | Facility: CLINIC | Age: 78
End: 2018-04-18
Attending: INTERNAL MEDICINE
Payer: MEDICARE

## 2018-04-18 VITALS
TEMPERATURE: 97.8 F | DIASTOLIC BLOOD PRESSURE: 70 MMHG | SYSTOLIC BLOOD PRESSURE: 132 MMHG | BODY MASS INDEX: 28.84 KG/M2 | WEIGHT: 206 LBS | HEIGHT: 71 IN | HEART RATE: 78 BPM | RESPIRATION RATE: 20 BRPM | OXYGEN SATURATION: 98 %

## 2018-04-18 LAB
ANION GAP SERPL CALCULATED.3IONS-SCNC: 8 MMOL/L (ref 3–14)
APTT PPP: 52 SEC (ref 22–37)
BUN SERPL-MCNC: 17 MG/DL (ref 7–30)
CALCIUM SERPL-MCNC: 8.8 MG/DL (ref 8.5–10.1)
CHLORIDE SERPL-SCNC: 103 MMOL/L (ref 94–109)
CO2 SERPL-SCNC: 26 MMOL/L (ref 20–32)
CREAT SERPL-MCNC: 0.92 MG/DL (ref 0.66–1.25)
GFR SERPL CREATININE-BSD FRML MDRD: 79 ML/MIN/1.7M2
GLUCOSE BLDC GLUCOMTR-MCNC: 65 MG/DL (ref 70–99)
GLUCOSE BLDC GLUCOMTR-MCNC: 87 MG/DL (ref 70–99)
GLUCOSE BLDC GLUCOMTR-MCNC: 88 MG/DL (ref 70–99)
GLUCOSE BLDC GLUCOMTR-MCNC: 88 MG/DL (ref 70–99)
GLUCOSE SERPL-MCNC: 70 MG/DL (ref 70–99)
LMWH PPP CHRO-ACNC: 0.26 IU/ML
POTASSIUM SERPL-SCNC: 3.6 MMOL/L (ref 3.4–5.3)
SODIUM SERPL-SCNC: 137 MMOL/L (ref 133–144)

## 2018-04-18 PROCEDURE — 96365 THER/PROPH/DIAG IV INF INIT: CPT

## 2018-04-18 PROCEDURE — 99217 ZZC OBSERVATION CARE DISCHARGE: CPT | Performed by: HOSPITALIST

## 2018-04-18 PROCEDURE — 99232 SBSQ HOSP IP/OBS MODERATE 35: CPT | Performed by: INTERNAL MEDICINE

## 2018-04-18 PROCEDURE — G0378 HOSPITAL OBSERVATION PER HR: HCPCS

## 2018-04-18 PROCEDURE — A9270 NON-COVERED ITEM OR SERVICE: HCPCS | Mod: GY | Performed by: INTERNAL MEDICINE

## 2018-04-18 PROCEDURE — 93306 TTE W/DOPPLER COMPLETE: CPT

## 2018-04-18 PROCEDURE — 36415 COLL VENOUS BLD VENIPUNCTURE: CPT | Performed by: INTERNAL MEDICINE

## 2018-04-18 PROCEDURE — 99207 ZZC CDG-CODE CATEGORY CHANGED: CPT | Performed by: HOSPITALIST

## 2018-04-18 PROCEDURE — 25000132 ZZH RX MED GY IP 250 OP 250 PS 637: Mod: GY | Performed by: NURSE PRACTITIONER

## 2018-04-18 PROCEDURE — A9270 NON-COVERED ITEM OR SERVICE: HCPCS | Mod: GY | Performed by: NURSE PRACTITIONER

## 2018-04-18 PROCEDURE — 96366 THER/PROPH/DIAG IV INF ADDON: CPT

## 2018-04-18 PROCEDURE — 25000128 H RX IP 250 OP 636: Performed by: HOSPITALIST

## 2018-04-18 PROCEDURE — 34300033 ZZH RX 343: Performed by: HOSPITALIST

## 2018-04-18 PROCEDURE — 25000132 ZZH RX MED GY IP 250 OP 250 PS 637: Mod: GY | Performed by: INTERNAL MEDICINE

## 2018-04-18 PROCEDURE — 80048 BASIC METABOLIC PNL TOTAL CA: CPT | Performed by: INTERNAL MEDICINE

## 2018-04-18 PROCEDURE — 93017 CV STRESS TEST TRACING ONLY: CPT

## 2018-04-18 PROCEDURE — 93018 CV STRESS TEST I&R ONLY: CPT | Performed by: INTERNAL MEDICINE

## 2018-04-18 PROCEDURE — A9502 TC99M TETROFOSMIN: HCPCS | Performed by: HOSPITALIST

## 2018-04-18 PROCEDURE — 40000855 ZZH STATISTIC ECHO STRESS OR NM NPI

## 2018-04-18 PROCEDURE — 78452 HT MUSCLE IMAGE SPECT MULT: CPT

## 2018-04-18 PROCEDURE — 93306 TTE W/DOPPLER COMPLETE: CPT | Mod: 26 | Performed by: INTERNAL MEDICINE

## 2018-04-18 PROCEDURE — 78452 HT MUSCLE IMAGE SPECT MULT: CPT | Mod: 26 | Performed by: INTERNAL MEDICINE

## 2018-04-18 PROCEDURE — 00000146 ZZHCL STATISTIC GLUCOSE BY METER IP

## 2018-04-18 PROCEDURE — 93016 CV STRESS TEST SUPVJ ONLY: CPT | Performed by: INTERNAL MEDICINE

## 2018-04-18 PROCEDURE — 85730 THROMBOPLASTIN TIME PARTIAL: CPT | Performed by: INTERNAL MEDICINE

## 2018-04-18 PROCEDURE — 85520 HEPARIN ASSAY: CPT | Performed by: INTERNAL MEDICINE

## 2018-04-18 RX ORDER — REGADENOSON 0.08 MG/ML
0.4 INJECTION, SOLUTION INTRAVENOUS ONCE
Status: COMPLETED | OUTPATIENT
Start: 2018-04-18 | End: 2018-04-18

## 2018-04-18 RX ADMIN — ASPIRIN 325 MG ORAL TABLET 325 MG: 325 PILL ORAL at 09:42

## 2018-04-18 RX ADMIN — LORATADINE 10 MG: 10 TABLET ORAL at 09:42

## 2018-04-18 RX ADMIN — REGADENOSON 0.4 MG: 0.08 INJECTION, SOLUTION INTRAVENOUS at 09:15

## 2018-04-18 RX ADMIN — TETROFOSMIN 11.3 MCI.: 1.38 INJECTION, POWDER, LYOPHILIZED, FOR SOLUTION INTRAVENOUS at 07:20

## 2018-04-18 RX ADMIN — TETROFOSMIN 32.6 MCI.: 1.38 INJECTION, POWDER, LYOPHILIZED, FOR SOLUTION INTRAVENOUS at 09:15

## 2018-04-18 RX ADMIN — RANITIDINE 150 MG: 150 TABLET ORAL at 09:42

## 2018-04-18 RX ADMIN — GEMFIBROZIL 600 MG: 600 TABLET ORAL at 09:42

## 2018-04-18 RX ADMIN — APIXABAN 5 MG: 5 TABLET, FILM COATED ORAL at 11:59

## 2018-04-18 RX ADMIN — LISINOPRIL 20 MG: 20 TABLET ORAL at 09:42

## 2018-04-18 RX ADMIN — CHOLECALCIFEROL CAP 125 MCG (5000 UNIT) 5000 UNITS: 125 CAP at 09:42

## 2018-04-18 RX ADMIN — CYANOCOBALAMIN TAB 1000 MCG 5000 MCG: 1000 TAB at 09:41

## 2018-04-18 RX ADMIN — Medication 1 TABLET: at 09:42

## 2018-04-18 RX ADMIN — LEVOTHYROXINE SODIUM 125 MCG: 125 TABLET ORAL at 09:42

## 2018-04-18 RX ADMIN — METOPROLOL TARTRATE 50 MG: 50 TABLET ORAL at 09:42

## 2018-04-18 NOTE — PROVIDER NOTIFICATION
MD Notification    Notified Person: MD    Notified Person Name: Nancy    Notification Date/Time: 04/18/18 @ 3:46 PM     Notification Interaction: text page    Purpose of Notification: per cardiology note, pt OK to discharge home.    Orders Received:    Comments:

## 2018-04-18 NOTE — PROGRESS NOTES
May 17, 2018 10:10 AM CDT   Return Discharge with JAKI White CNP   [facility] ([facility])    [facility] [id number] [phone] OPT 2

## 2018-04-18 NOTE — PROGRESS NOTES
A follow-up appointment has been made for this patient.     May 01, 2018  2:00 PM CDT   Office Visit with Andrew Elizalde MD   [facility] ([facility])    [address] [place] [id number] [phone]

## 2018-04-18 NOTE — CONSULTS
"Consult Date:  04/17/2018      CARDIOLOGY CONSULTATION      PATIENT HISTORY:  Mr. Pascual Perea is 77 years old and has experienced a respiratory illness with a cough for which he sought attention in the Emergency Department.  He has a history of heart disease and was found to have a trivial elevation of his troponin and so he was admitted to the hospital.  Cardiology consultation has been requested by the Hospitalist Service.      Mr. Perea has a history of coronary artery disease and prior coronary artery bypass graft surgery with placement of an LIMA graft to the LAD in 1997.  He is followed by Dr. Speedy Olivas.  He has not experienced chest, neck, arm or back discomfort.  He also has atrial fibrillation for which he is on anticoagulation with apixaban.  He is followed by Dr. Satnam Jarvis for his history of atrial fibrillation/flutter.  He has not noted palpitations or an increase in his heart rate.      Mr. Perea noted that he became ill about 1 week ago.  He has noted nasal congestion and wheezing.  He has had a cough which has been increasing and is productive of sputum.  When he coughs, his chest feels \"tight.\" That symptom only occurs with the cough and is not present as soon as he stops coughing.  He has attributed that sensation to his frequent cough.      In 2016, Mr. Perea had normal left ventricular systolic performance by echocardiogram.  There was a mean gradient across the aortic valve of 8 mmHg and he had evidence of mild pulmonary hypertension.  His last stress nuclear study was performed also in 2016 and that demonstrated a very small fixed apical defect with normal left ventricular systolic performance.  In 2012, he had experienced some chest discomfort and eventually underwent coronary angiography which demonstrated a widely patent LIMA graft to the LAD and retrograde filling back to the second diagonal branch.  The right coronary artery was dominant without disease and the circumflex " coronary artery was nondominant with only mild irregularities while the left main coronary artery was free of any disease.      His EKG has demonstrated atrial fibrillation/flutter with left ventricular hypertrophy, J point elevation in the anteroseptal leads which is unchanged from prior EKGs, and asymmetric and mild ST segment depression with T-wave inversions in the lateral leads.  This is also unchanged.  The troponin levels refugio from 0.048-0.063 before receding to 0.052.  A chest x-ray has not demonstrated infiltrates.  Mr. Perea has noted that his wife was listening to his chest (she is a retired nurse) and she heard a murmur.  With the onset of his illness about a week ago, he did have some chills.  He also notes that he has been more dyspneic with exertion such as climbing stairs.      PAST MEDICAL HISTORY:   1.  Coronary artery disease with prior single-vessel bypass surgery.   2.  History of dyslipidemia.   3.  Hypothyroidism.   4.  Testosterone deficiency.   5.  Diabetes mellitus.   6.  History of atrial flutter/fibrillation with anticoagulation.   7.  History of sleep apnea, not treated.   8.  History of gastroesophageal reflux disease.   9.  History of cataract surgery.   10.  History of septoplasty and turbinoplasty.      ALLERGIES:  OMEPRAZOLE.      MEDICATIONS ON ADMISSION:   1.  Apixaban 5 mg p.o. b.i.d.   2.  Aspirin 325 mg p.o. daily.   3.  Vitamin D and vitamin B12 supplementation daily as well as iron supplementation.   4.  Flonase spray 1-2 sprays to each nostril daily.   5.  Gemfibrozil 600 mg p.o. b.i.d.   6.  Aspart 2 units per carb subq.   7.  Detemir 40-45 units subcu at bedtime.   8.  Levalbuterol nebulizers.   9.  Lisinopril 20 mg p.o. daily.   10.  Claritin 10 mg p.o. daily.   11.  Metformin 1000 mg p.o. b.i.d.   12.  Metoprolol tartrate 50 mg p.o. b.i.d.   13.  Pravastatin 80 mg p.o. daily.   14.  Zantac 150 mg p.o. b.i.d.   15.  Testosterone 300 mg IM every 2 weeks.      FAMILY  "HISTORY:  Significant for coronary artery disease in his mother and cardiovascular disease which resulted in the death of his brother at age 31 years.      SOCIAL HISTORY:  He previously smoked, but stopped in 1964.  He is .      REVIEW OF SYSTEMS:  Negative except as noted in the HPI.      PHYSICAL EXAMINATION:   GENERAL:  This is a man in no apparent distress, sitting in bed.  He sounded congested with a \"nasal\" tone to his voice when he spoke.   VITAL SIGNS:  Blood pressure was 158/87 mmHg, heart rate 50-70 beats per minute and irregular, and respiratory rate 16-22 per minute.   SKIN:  Warm and dry and he was mildly flushed.   HEAD:  Normocephalic.   EYES:  Notable for an absence of scleral icterus.     NECK: Notable for an absence of thyromegaly.  The carotid upstrokes were normal without bruits.     LUNGS: The chest revealed a diffuse decrease in breath sounds with expiratory wheezes at both bases and a mildly prolonged expiratory phase.   CARDIOVASCULAR:  On cardiac auscultation, there was an irregular S1 and an S2 with a grade 1-2/6 early systolic ejection murmur and a grade 1/4, nearly holodiastolic murmur at the lower left sternal border.   EXTREMITIES:  There is trace edema of the ankles.   NEUROLOGIC:  Facies were symmetric and he moved all extremities without focal limitation.      LABORATORY AND DIAGNOSTIC DATA:  EKG was as described.        The sodium was 139, chloride 105, BUN 13 and the potassium was 4.4.  The CO2 was 26 and a creatinine of 1.05.  The white blood cell count was 4.5, hemoglobin 14.2 and platelet count 142,000.  The TSH was 0.41.  The troponin levels were as noted.      ASSESSMENT AND RECOMMENDATIONS:  This is an individual with chest discomfort which is likely the result of his coughing and it is associated with coughing and musculoskeletal pain.  His troponin levels are trivially elevated, which is likely the result of his respiratory illness.  He likely has bronchitis.  " Appropriate therapy with bronchodilators as indicated, analgesia and possibly antihistamines is recommended.  Since his troponin level is falling, I would also recommend a Lexiscan stress nuclear study.  That can be compared to his prior stress study.      With regard to his heart murmur, I have recommended a repeat echocardiogram to reassess the aortic valve.  If the aortic valve appears significantly abnormal as compared to his prior echocardiogram, he will also benefit from blood cultures.      Further recommendations will depend upon his response to therapy.  With regard to the atrial fibrillation, he would seem to be rate controlled.  He is on anticoagulation.      With regard to his history of hypertension, his blood pressures are not optimally controlled.  He is likely to require additional antihypertensive therapy and this could include the addition of calcium channel blockade such as nifedipine XL  mg p.o. daily.         EVIE BOURGEOIS MD, FACC             D: 2018   T: 2018   MT:       Name:     JESÚS VASQUEZ   MRN:      [id number]        Account:       [id number]   :      1940           Consult Date:  2018      Document: [id number]       cc: Andrew Elizalde MD

## 2018-04-18 NOTE — PROVIDER NOTIFICATION
MD Notification    Notified Person: MD    Notified Person Name: Nancy    Notification Date/Time: 04/18/18 @ 12:24 PM     Notification Interaction: spoke with MD    Purpose of Notification: Pt back from lexiscan, and BG 87, do you want carb count insulin given?    Orders Received: hold carb count insulin    Comments:

## 2018-04-18 NOTE — PLAN OF CARE
Problem: Patient Care Overview  Goal: Plan of Care/Patient Progress Review  Outcome: No Change  A&O. Tele: NSR, HR 70-80's, overnight patient converted back to A fib w/ SVR, HR 40-60's. 2.4 second pause noted, patient asymptomatic. BP's elevated w/ all other VSS overnight. Up w/ SBA. Denies CP and SOB. Lung sounds w/ expiratory wheezes. Heparin gtt @ 10 unit/hr. PTT 52 this AM, therapeutic. 35 units of Levemir given at bedtime, BS 88 overnight, 70 early this AM, will closely monitor. Plan for lexiscan today. No caffeine diet.

## 2018-04-18 NOTE — PROGRESS NOTES
[facility]    Cardiology Progress Note    Date of Service (when I saw the patient): 04/18/2018     Assessment & Plan   Pascual Perea is a 77 year old male who was admitted on 4/16/2018 with respiratory illness. Initial trivial . Hx of LIMA to LAD in 1997, afib on apixaban. Follows with Dr. Olivas and Dr. Jarvis.    Trop 0.052->0.063->0.052  Trivial elevation in the setting of respiratory illness.  EF 55-60 on echo, no WMA or change compared to 2016  Lexiscan nuc in process, waiting for read.    Afib  -Some bradycardia, resting 2.1 sec pause with HR in 40s sitting in his room. HR 36- low 50s on tele strips but in 70s on chart.  -Continue PTA eliquis 5 BID  Plan:   No change, pt has f/u Monday with Dr. Jarvis    HTN  Lisinopril 20, lopressor 50 BId  -151  Plan: cont to monitor    CAD  Previous bypass  Nuc in process  ACE, BB, statin, no ASA presumably d/t risk for bleed with NOAC    Interval History   Continues to have cough, no SOB or CP. Nuc complete, waiting for read.    Physical Exam   Temp: 98.9  F (37.2  C) Temp src: Oral BP: 142/77   Heart Rate: 79 Resp: 20 SpO2: 97 % O2 Device: None (Room air)    Vitals:    04/16/18 1332 04/17/18 0500 04/18/18 0700   Weight: 96.6 kg (213 lb) 94.9 kg (209 lb 3.2 oz) 93.4 kg (206 lb)     Vital Signs with Ranges  Temp:  [97.5  F (36.4  C)-98.9  F (37.2  C)] 98.9  F (37.2  C)  Heart Rate:  [54-80] 79  Resp:  [20] 20  BP: (130-158)/(69-87) 142/77  SpO2:  [94 %-99 %] 97 %  I/O last 3 completed shifts:  In: 930 [P.O.:550; I.V.:380]  Out: 1650 [Urine:1650]    Constitutional     alert and oriented, in no acute distress.     Skin     warm and dry to touch    ENT     no pallor or cyanosis    Neck    Supple, JVP normal    Chest     no tenderness to palpation    Lungs  clear to auscultation, no crackles/wheezing    Cardiac  regular rhythm, S1 normal, S2 normal    Abdomen     abdomen soft, obese    Extremities and Back     No edema observed.        Neurological      no gross motor deficits noted, affect appropriate, oriented to time, person and place.    Medications     - MEDICATION INSTRUCTIONS -       Reason anticoagulation order not selected       HEParin 1,000 Units/hr (04/17/18 2015)     HEParin 1,000 Units/hr (04/18/18 0954)     - MEDICATION INSTRUCTIONS -       - MEDICATION INSTRUCTIONS -       Reason ACE/ARB/ARNI order not selected         aspirin  325 mg Oral Daily     cholecalciferol (vitamin D3) capsule CAPS 5,000 Units  5,000 Units Oral Daily     cyanocobalamin  5,000 mcg Oral Daily     ferrous fumarate 65 mg ([place]. FE)-Vitamin C 125 mg  1 tablet Oral BID     fluticasone  1 spray Both Nostrils QPM     gemfibrozil  600 mg Oral BID AC     insulin aspart  1-3 Units Subcutaneous TID AC     insulin aspart  1-3 Units Subcutaneous At Bedtime     insulin aspart   Subcutaneous QAM AC     insulin aspart   Subcutaneous Daily with lunch     insulin aspart   Subcutaneous Daily with supper     insulin detemir  35 Units Subcutaneous At Bedtime     levothyroxine  125 mcg Oral Daily     lisinopril  20 mg Oral Daily     loratadine  10 mg Oral Daily     metoprolol tartrate  50 mg Oral BID     pravastatin  80 mg Oral QPM     ranitidine  150 mg Oral BID     sodium chloride (PF)  10 mL Intravenous Once     sodium chloride (PF)  3 mL Intracatheter Q8H       Data   Results for orders placed or performed during the hospital encounter of 04/16/18 (from the past 24 hour(s))   Heparin Xa level (AM Draw)   Result Value Ref Range    Heparin 10A Level 0.45 IU/mL   PTT (AM Draw)   Result Value Ref Range    PTT 52 (H) 22 - 37 sec   Glucose by meter   Result Value Ref Range    Glucose 104 (H) 70 - 99 mg/dL   Troponin I   Result Value Ref Range    Troponin I ES 0.052 (H) 0.000 - 0.045 ug/L   Glucose by meter   Result Value Ref Range    Glucose 135 (H) 70 - 99 mg/dL   Glucose by meter   Result Value Ref Range    Glucose 123 (H) 70 - 99 mg/dL   Glucose by meter   Result Value Ref Range    Glucose 88  70 - 99 mg/dL   Heparin Xa (10a) Level   Result Value Ref Range    Heparin 10A Level 0.26 IU/mL   Basic metabolic panel   Result Value Ref Range    Sodium 137 133 - 144 mmol/L    Potassium 3.6 3.4 - 5.3 mmol/L    Chloride 103 94 - 109 mmol/L    Carbon Dioxide 26 20 - 32 mmol/L    Anion Gap 8 3 - 14 mmol/L    Glucose 70 70 - 99 mg/dL    Urea Nitrogen 17 7 - 30 mg/dL    Creatinine 0.92 0.66 - 1.25 mg/dL    GFR Estimate 79 >60 mL/min/1.7m2    GFR Estimate If Black >90 >60 mL/min/1.7m2    Calcium 8.8 8.5 - 10.1 mg/dL   PTT (AM Draw)   Result Value Ref Range    PTT 52 (H) 22 - 37 sec   Glucose by meter   Result Value Ref Range    Glucose 65 (L) 70 - 99 mg/dL   Glucose by meter   Result Value Ref Range    Glucose 88 70 - 99 mg/dL   Echocardiogram Complete    Narrative    [id number]  [facility]ly [facility], later [facility]  [id number]  [id number]^A           [facility]  Echocardiography Laboratory  [address] [place] [address]        Name: JESÚS VASQUEZ  MRN: [id number]  : 1940  Study Date: 2018 09:42 AM  Age: 77 yrs  Gender: Male  Patient Location: [facility]  Reason For Study: CAD  Ordering Physician: LAKSHMI GONSALVES  Referring Physician: Andrew Elizalde  Performed By: Liana Canales,     BSA: 2.2 m2  Height: 71 in  Weight: 213 lb  HR: 87  BP: 147/76 mmHg  _____________________________________________________________________________  __        Procedure  Complete Portable Echo Adult.  _____________________________________________________________________________  __        Interpretation Summary     The left ventricle is normal in size. There is normal left ventricular wall  thickness. Left ventricular systolic function is normal. The visual ejection  fraction is estimated at 55-60%. Left ventricular diastolic function is  indeterminate. No regional wall motion abnormalities noted.  The right ventricle is mildly dilated. Mildly decreased right ventricular  systolic function.  There is mild-moderate  biatrial enlargement. There is no color Doppler  evidence of an atrial shunt.  Trace mitral and tricuspid regurgitation.  No pericardial effusion.  In direct comparison to the previous study dated 05/20/2016, the findings are  similar.  _____________________________________________________________________________  __        Left Ventricle  The left ventricle is normal in size. There is normal left ventricular wall  thickness. Left ventricular systolic function is normal. The visual ejection  fraction is estimated at 55-60%. Left ventricular diastolic function is  indeterminate. No regional wall motion abnormalities noted.     Right Ventricle  The right ventricle is mildly dilated. Mildly decreased right ventricular  systolic function.     Atria  There is mild-moderate biatrial enlargement. There is no color Doppler  evidence of an atrial shunt.     Mitral Valve  There is trace mitral regurgitation.        Tricuspid Valve  There is trace tricuspid regurgitation. The right ventricular systolic  pressure is approximated at 29.7 mmHg plus the right atrial pressure.     Aortic Valve  There is moderate trileaflet aortic sclerosis. No aortic regurgitation is  present. No aortic stenosis is present.     Pulmonic Valve  There is mild (1+) pulmonic valvular regurgitation. There is no pulmonic  valvular stenosis.     Vessels  The aortic root is normal size. Normal size ascending aorta. Dilated inferior  vena cava.     Pericardium  There is no pericardial effusion.        Rhythm  The rhythm was atrial fibrillation.  _____________________________________________________________________________  __  MMode/2D Measurements & Calculations  IVSd: 0.97 cm     LVIDd: 4.9 cm  LVIDs: 3.3 cm  LVPWd: 1.1 cm  FS: 31.1 %  LV mass(C)d: 178.3 grams  LV mass(C)dI: 82.3 grams/m2  Ao root diam: 3.3 cm  LA dimension: 4.6 cm  asc Aorta Diam: 3.5 cm  LA/Ao: 1.4  LA Volume (BP): 67.8 ml  LA Volume Index (BP): 31.2 ml/m2  RWT: 0.44           Doppler  Measurements & Calculations  MV E max roxi: 85.9 cm/sec  MV dec time: 0.20 sec  PA acc time: 0.08 sec  TR max roxi: 272.5 cm/sec  TR max P.7 mmHg  E/E' av.2  Lateral E/e': 9.6  Medial E/e': 16.8           _____________________________________________________________________________  __           Report approved by: Moise Tony 2018 10:23 AM          JAKI Brito, CNP  Cardiology  Pager:  [phone]

## 2018-04-18 NOTE — DISCHARGE SUMMARY
Discharge Summary  Hospitalist    Date of Admission:  4/16/2018  Date of Discharge:  4/18/2018  Discharging Provider: Anthony Cruz MD    Primary Care Physician   Andrew Elizalde  Primary Care Provider Phone Number: [phone]  Primary Care Provider Fax Number: [phone]    PRINCIPAL DIAGNOSIS  Positive troponin likely in the setting of demand from URI  Dyspnea on exertion  history of coronary artery disease  Viral URI  History of mild pulmonary hypertension seen on echocardiogram 2016.   History of obstructive sleep apnea not on CPAP.   Obesity with a BMI of 29.18    Past Medical History:   Diagnosis Date     Adhesive capsulitis of shoulder      Anemia 3/10/2014     Anemia, unspecified      Antiplatelet or antithrombotic long-term use      Arrhythmia     Atrial fib/flutter     CAD (coronary artery disease)      Hyperlipidemia LDL goal <70 5/26/2011     Hypertension      Hypothyroidism      Impotence of organic origin      Laceration of finger  June 2010     left thumb s/p sutures     Numbness and tingling     diabetic neuropathy bilateral feet     BRANDON (obstructive sleep apnea)     intolerant of CPAP, uses it occasionally.  Using mandibular device occasionally     Osteopenia      Pulmonary hypertension 4/6/2012     Sensorineural hearing loss, unspecified      Stented coronary artery 1997    CABG      Testosterone deficiency      Type 2 diabetes mellitus without complication  (goal A1C<8) 10/24/2015     Typical atrial flutter (H) 4/18/16     Unspecified hypothyroidism      Vitamin D deficiency 9/3/2011       History of Present Illness   Pascual Perea is an 77 year old male who presented with shortness of breath.    Hospital Course   Positive troponin likely in the setting of demand from URI  Dyspnea on exertion  history of coronary artery disease  Patient went to the [facility] on 04/16 with shortness of breath and some chest tightness and cough.  EKG showed A Fib with some T-wave inversions laterally,  fairly similar to prior, but there is also slight ST depression in V6. Troponin of 0.048, sent to the ED. Chest x-ray negative for any infiltrate.   N-terminal pro BNP 1793. Troponin 0.048 > 0.052 >0.063  Patient really does not have classical chest pain, however,  has some substernal burning when he is coughing.      Echocardiogram showed EF of 55 to 60%, left ventricular diastolic function indeterminate. Mild to moderate by atrial enlargement.  Nuclear stress study - small area of inferior ischemia  cardiology team does not recommend any further evaluation in the absence of symptoms at this time. Follow-up with Dr. Olivas after he recovers from respiratory illness  Telemetry monitoring - no acute events  Continued PTA aspirin 325 mg daily during hospitalization, cardiology team recommend to discontinue aspirin as patient on apixiban and risk for bleeding.  Continued PTA pravastatin 80 mg at bedtime.  Continued PTA metoprolol 50 mg two times a day.     Viral URI  patient states has been having ongoing cough, nasal discomfort for a few days now.  WBC count 4.5, influenza A and B negative, chest x-ray no acute infiltrates  supportive care with adequate oral hydration, frequent handwashing.  Normal saline nasal drops, lozenges  Continued PTA PRN loratadine.     Atrial fibrillation on chronic anticoagulation  Patient currently in a fib with rate control.  Denies any palpitations at this time.  Continued PTA Apixiban 5 mg by mouth two times daily.  Continued PTA metoprolol 50 mg two times a day.  Has an appointment with Dr. Jarvis this coming Monday to follow up on his atrial fibrillation.        Hypertension  Continued PTA lisinopril 20 mg oral daily.  Continued PTA metoprolol 50 mg two times a day.     Diabetes mellitus   Hemoglobin A1c 6.9  PTA metformin thousand milligrams two times a day restarted at the time of discharge.  Continue PTA insulin aspart two units per carbohydrate choice three times daily if blood  sugars greater than hundred  PTA insulin detemir 40 to 45 units at bedtime. Lowered to 35 units during hospitalization as patient was NPO for procedure     Hypothyroidism  Continue PTA levothyroxine 125  g daily.  TSH in February 2018 0.41     Hyperlipidemia  prior lipid panel in 2017 per goal  Continue PTA gemfibrozil 600 mg daily, pravastatin 80 mg tablet daily.     GERD  Continue  mg ranitidine BID.     History of testosterone deficiency.   Patient receives testosterone injections every 14 days.  Follow-up as outpatient.     History of mild pulmonary hypertension seen on echocardiogram 2016.   History of obstructive sleep apnea not on CPAP.   Obesity with a BMI of 29.18  consider lifestyle modification with diet and exercise.  Consider sleep studies as outpatient.     History of vitamin D deficiency.   PTA On vitamin D replacement therapy with 5000 units daily.    # Discharge Pain Plan:   - Patient currently has NO PAIN and is not being prescribed pain medications on discharge.  Patient and interdisciplinary team involved in care and agree with discharge plan.  Anthony Cruz MD    Pending Results   Unresulted Labs Ordered in the Past 30 Days of this Admission     No orders found from 2/15/2018 to 4/17/2018.          Physical Exam   Vitals:    04/16/18 1332 04/17/18 0500 04/18/18 0700   Weight: 96.6 kg (213 lb) 94.9 kg (209 lb 3.2 oz) 93.4 kg (206 lb)     Vital Signs with Ranges  Temp:  [97.2  F (36.2  C)-98.9  F (37.2  C)] 97.2  F (36.2  C)  Heart Rate:  [54-80] 75  Resp:  [20] 20  BP: (133-158)/(69-87) 136/77  SpO2:  [95 %-99 %] 97 %  I/O last 3 completed shifts:  In: 930 [P.O.:550; I.V.:380]  Out: 1325 [Urine:1325]  PHYSICAL EXAM  GENERAL: Patient is in no distress. Alert and oriented.  HEART: irregular rate and rhythm. S1S2. Systolic murmur   LUNGS: bilateral decreased breath sounds, faint wheezing all over lung fields.  ABDOMEN: Soft, no abdominal tenderness, bowel sounds heard   NEURO: moving all  extremities.  EXTREMITIES: Trace ankle edema. 2+ peripheral pulses.  SKIN: Warm, dry. No rash or bruising.  PSYCHIATRY Cooperative  )Consultations This Hospital Stay   CARDIAC REHAB IP CONSULT  PHARMACY TO DOSE HEPARIN  CARDIOLOGY IP CONSULT  SMOKING CESSATION PROGRAM IP CONSULT  SMOKING CESSATION PROGRAM IP CONSULT    Time Spent on this Encounter   IAnthony Lashonkatie, personally saw the patient today and spent greater than 30 minutes discharging this patient.  More than 50% of time spent in direct patient care, care coordination, patient/caregiver counseling, and formalizing plan of care.    Discharge Orders     Follow-Up with Cardiologist     Follow-Up with Cardiac Advanced Practice Provider     Reason for your hospital stay   You where admitted from urgent care with elevated heart muscle enzymes. You have undergone a stress test. Continue current medications and follow-up with cardiology as outpatient.     Follow-up and recommended labs and tests    Follow up with primary care provider, Andrew Elizalde, within 7 days for hospital follow- up.  No follow up labs or test are needed.     Activity   Your activity upon discharge: activity as tolerated     Monitor and record   blood pressure and heart rate daily  monitor blood sugars at least 2 to 3 times a day     When to contact your care team   Call your primary doctor if you have any of the following:  increased shortness of breath or increased pain.     Discharge Instructions   Aggressive incentives spirometry.  Supportive care with adequate oral hydration,  over-the-counter Tylenol as needed.  consider lifestyle modification with diet and exercise.  Consider sleep studies as outpatient.        Full Code     Diet   Low-fat low Saturated Fat Na <2400mg Diet, No Caffeine Diet       Discharge Medications   Current Discharge Medication List      CONTINUE these medications which have NOT CHANGED    Details   apixaban ANTICOAGULANT (ELIQUIS) 5 MG tablet Take 1 tablet (5  mg) by mouth 2 times daily  Qty: 60 tablet, Refills: 11    Associated Diagnoses: Typical atrial flutter (H)      Cholecalciferol (VITAMIN D3 PO) Take 5,000 Units by mouth daily      cyanocolbalamin (VITAMIN  B-12) 1000 MCG tablet Take 5,000 mcg by mouth daily.      ferrous fumarate 65 mg, [place]. FE,-Vitamin C 125 mg (VITRON-C)  MG TABS tablet 1 tab twice a day for anemia  Qty: 60 tablet, Refills: 11    Associated Diagnoses: Iron deficiency anemia, unspecified iron deficiency anemia type      fluticasone (FLONASE) 50 MCG/ACT spray USE ONE TO TWO SPRAYS IN EACH NOSTRIL EVERY DAY  Qty: 48 g, Refills: 3    Associated Diagnoses: Chronic allergic rhinitis, unspecified seasonality, unspecified trigger      gemfibrozil (LOPID) 600 MG tablet TAKE ONE TABLET BY MOUTH TWICE A DAY  Qty: 180 tablet, Refills: 1    Associated Diagnoses: Hyperlipidemia LDL goal <70      GLUCOSE 4 GM OR CHEW as directed  Qty: 30, Refills: 11    Associated Diagnoses: Type II or unspecified type diabetes mellitus without mention of complication, not stated as uncontrolled      insulin aspart (NOVOLOG FLEXPEN) 100 UNIT/ML injection Inject 3 times daily (before meals). 2 units per carb choice (approx 5-10 units/meal)  Qty: 30 mL, Refills: 3    Comments: Approx 25-30 units/day total. Profile only. Pt doesn't require refill at this time  Associated Diagnoses: Type 2 diabetes mellitus without complication, with long-term current use of insulin (H)      levalbuterol (XOPENEX) 1.25 MG/3ML neb solution Take 3 mLs (1.25 mg) by nebulization once for 1 dose  Qty: 3 mL    Associated Diagnoses: Wheezing; SOB (shortness of breath)      levothyroxine (SYNTHROID/LEVOTHROID) 125 MCG tablet TAKE ONE TABLET BY MOUTH EVERY DAY  Qty: 90 tablet, Refills: 3    Associated Diagnoses: Hypothyroidism, unspecified type      lisinopril (PRINIVIL/ZESTRIL) 20 MG tablet TAKE ONE TABLET BY MOUTH EVERY DAY  Qty: 90 tablet, Refills: 3    Associated Diagnoses: Essential  hypertension, benign      loratadine (CLARITIN) 10 MG tablet TAKE ONE TABLET BY MOUTH EVERY DAY  Qty: 90 tablet, Refills: 3    Associated Diagnoses: Chronic allergic rhinitis, unspecified seasonality, unspecified trigger      metFORMIN (GLUCOPHAGE) 1000 MG tablet TAKE ONE TABLET BY MOUTH TWO TIMES A DAY WITH MEALS  Qty: 60 tablet, Refills: 4    Associated Diagnoses: Type 2 diabetes mellitus without complication (H)      metoprolol tartrate (LOPRESSOR) 50 MG tablet TAKE ONE TABLET BY MOUTH TWICE A DAY  Qty: 180 tablet, Refills: 3    Associated Diagnoses: Essential hypertension, benign      nitroglycerin (NITROSTAT) 0.4 MG SL tablet Place 1 tablet (0.4 mg) under the tongue every 5 minutes as needed for chest pain  Qty: 25 tablet, Refills: 1    Associated Diagnoses: Unstable angina (H)      pravastatin (PRAVACHOL) 80 MG tablet TAKE ONE TABLET BY MOUTH EVERY NIGHT AT BEDTIME  Qty: 90 tablet, Refills: 1    Associated Diagnoses: Hyperlipidemia LDL goal <70      ranitidine (ZANTAC) 150 MG tablet TAKE ONE TABLET BY MOUTH TWICE A DAY  Qty: 180 tablet, Refills: 3    Associated Diagnoses: Gastroesophageal reflux disease without esophagitis      testosterone cypionate (DEPOTESTOTERONE) 200 MG/ML injection Inject 1.5 mLs (300 mg) into the muscle every 14 days  Qty: 10 vial, Refills: 1    Associated Diagnoses: Testosterone deficiency      blood glucose monitoring (ONE TOUCH ULTRA) test strip Test twice daily with One Touch Ultra Blue  Qty: 200 each, Refills: 3    Comments: Profile only. Pt doesn't require refill at this time  Associated Diagnoses: Type 2 diabetes mellitus without complication, with long-term current use of insulin (H)      insulin detemir (LEVEMIR FLEXPEN/FLEXTOUCH) 100 UNIT/ML injection Inject 40-45 Units Subcutaneous At Bedtime  Qty: 45 mL, Refills: 3    Comments: Stop refills of Lantus due to formulary change with insurance  Associated Diagnoses: Type 2 diabetes mellitus without complication, with long-term  "current use of insulin (H)      insulin pen needle (NOVOFINE 30) 30G X 8 MM 1 Syringe 4 times daily  Qty: 400 each, Refills: 3    Comments: Profile only. Pt doesn't require refill at this time  Associated Diagnoses: Type 2 diabetes mellitus without complication, with long-term current use of insulin (H)      ONETOUCH DELICA LANCETS 33G MISC 1 Device 3 times daily . Use to test blood sugars 3 times daily or as directed.  Qty: 300 each, Refills: prn    Associated Diagnoses: Type 2 diabetes, HbA1C goal < 8% (H)      order for DME Equipment being ordered: 3ml syringes and 22G 1 1/2\" needles to use with testosterone inj every 2 weeks  Qty: 30 Device, Refills: 1    Associated Diagnoses: Testosterone deficiency         STOP taking these medications       aspirin 325 MG tablet Comments:   Reason for Stopping:             Allergies   Allergies   Allergen Reactions     Omeprazole      diarrhea       Discharge Disposition   Discharged to home  Condition at discharge: Fair    DATA  Most Recent 3 CBC's:  Recent Labs   Lab Test  04/17/18   0530  04/16/18   1647  04/16/18   1147  02/12/18   0829   WBC   --   4.5  4.9  4.2   HGB   --   14.2  15.1  12.6*   MCV   --   87  90  96   PLT  142*  148*  Platelets clumped, platelet count unavailable  158      Most Recent 3 BMP's:  Recent Labs   Lab Test  04/18/18   0550  04/16/18   1147  02/12/18   0829   NA  137  139  141   POTASSIUM  3.6  4.4  4.5   CHLORIDE  103  105  107   CO2  26  26  30   BUN  17  13  11   CR  0.92  1.05  0.85   ANIONGAP  8  8  4   STARR  8.8  9.3  9.8   GLC  70  120*  177*     Most Recent 2 LFT's:  Recent Labs   Lab Test  04/16/18   1147  02/12/18   0829   AST  27  16   ALT  12  14   ALKPHOS  70  77   BILITOTAL  1.1  1.0     Most Recent INR's and Anticoagulation Dosing History:  Anticoagulation Dose History     Recent Dosing and Labs Latest Ref Rng & Units 5/12/2005 3/30/2012    INR 0.86 - 1.14 0.90 0.94        Most Recent 3 Troponin's:  Recent Labs   Lab Test  " 04/17/18   1335  04/16/18   2231  04/16/18   1647   TROPI  0.052*  0.063*  0.052*     Most Recent Cholesterol Panel:  Recent Labs   Lab Test  09/13/17   0840   CHOL  104   LDL  31   HDL  40   TRIG  166*     Most Recent 6 Bacteria Isolates From Any Culture (See EPIC Reports for Culture Details):  Recent Labs   Lab Test  08/18/16   0800   CULT  Moderate growth Normal respiratory tabitha     Most Recent TSH, T4 and A1c Labs:  Recent Labs   Lab Test  04/16/18   1647  02/12/18   0829   04/15/16   0030   TSH   --   0.41   < >  5.68*   T4   --    --    --   0.82   A1C  6.9*  6.5*   < >   --     < > = values in this interval not displayed.     Results for orders placed or performed during the hospital encounter of 04/16/18   NM Lexiscan stress test (nuc card)    Narrative    GATED MYOCARDIAL PERFUSION SCINTIGRAPHY WITH INTRAVENOUS PHARMACOLOGIC  VASODILATATION LEXISCAN -ONE DAY STUDY     4/18/2018 11:13 AM  JESÚS VASQUEZ  77 years  Male  1940.    Indication/Clinical History: Increased troponins    Impression  1.  Myocardial perfusion imaging using single isotope technique  demonstrated a small basal inferior infarct with small area of mild  ischemia in the inferior mid ventricle  There is a very small possible apical nontransmural infarct with  minimal mikey-infarct ischemia (previously described).   2. Gated images demonstrated basal inferior hypokinesis.  The left  ventricular systolic function is 72% at rest and 50% post stress.  3. Compared to the prior study from 4/15/2016, prior study described a  very small fixed apical defect possibly representing nontransmural  infarct with an ejection fraction of 61%.    Procedure  Pharmacologic stress testing was performed with Lexiscan at a rate of  0.08 mg/ml rapid bolus injection, for 15 seconds, 0.4 mg/5ml  intravenously. Low-level exercise was not performed along with the  vasodilator infusion.  The heart rate was 74 at baseline and refugio to  77 beats per minute during  the Lexiscan infusion. The rest blood  pressure was 148/79 mmHg and was 147/79 mm Hg during Lexiscan  infusion. The patient experienced no chest pain  during the test.    Myocardial perfusion imaging was performed at rest, approximately 45  minutes after the injection intravenously of 11.3 mCi of Tc-99m  Myoview. At peak pharmacologic effect, 10-20 seconds after Lexiscan,   the patient was injected intravenously with 32.6 mCi of  Tc-99m  Myoview. The post-stress tomographic imaging was performed  approximately 60 minutes after stress.    EKG Findings  The resting EKG demonstrated atrial flutter with nonspecific ST-T  abnormalities. The stress EKG demonstrated occasional PVCs. No changes  in ST segments over baseline to suggest ischemia or injury.    Tomographic Findings  Overall, the study quality is adequate . On the stress images, there  is a small area of decreased uptake at the apex. There is a decrease  in uptake the length of the inferior wall . On the rest images, there  is a trivial decrease in uptake at the apex and small area of mildly  decreased uptake at the inferior base . Gated images demonstrated  basal inferior hypokinesis The left ventricular ejection fraction was  calculated to be 50% post stress . . TID was is not seen with an index  of 1.19. Rotating planar images are reviewed and appropriately motion  corrected. Diaphragmatic/lateral soft tissue attenuation artifact is  seen which may impact on interpretation.    RAINA SAXENA MD

## 2018-04-18 NOTE — PLAN OF CARE
Problem: Patient Care Overview  Goal: Plan of Care/Patient Progress Review  OT/CR: Noted plan for stress test. Holding eval until after and plan is in place.

## 2018-04-18 NOTE — PROVIDER NOTIFICATION
MD Notification    Notified Person: MD    Notified Person Name: Nancy    Notification Date/Time: 04/18/18 @ 8:20 AM     Notification Interaction: text page/spoke with MD    Purpose of Notification: Pt's BG 65 this AM. Orange juice and soco crackers given.    Orders Received: Hold AM insulin.    Comments: recheck 88 - MD aware

## 2018-04-18 NOTE — PLAN OF CARE
Problem: Patient Care Overview  Goal: Plan of Care/Patient Progress Review  Outcome: Adequate for Discharge Date Met: 04/18/18  AVS printed and given to patient. Discharge instructions, medications and follow up appointments reviewed with patient. All questions answered, patient verbalized understanding. All belongings sent with patient. Patient's son to provide ride home. Patient discharged off unit with [employer].

## 2018-04-19 ENCOUNTER — CARE COORDINATION (OUTPATIENT)
Dept: CARDIOLOGY | Facility: CLINIC | Age: 78
End: 2018-04-19

## 2018-04-19 ENCOUNTER — TELEPHONE (OUTPATIENT)
Dept: INTERNAL MEDICINE | Facility: CLINIC | Age: 78
End: 2018-04-19

## 2018-04-19 NOTE — PROGRESS NOTES
Patient was evaluated by cardiology while inpatient for afib and trivial trop elevation . Called patient and left  to discuss any post hospital d/c questions he may have, review medication changes, and confirm f/u appts. RN advised patient in  that he has an apt scheduled on 4/23/18 with Dr. Jarvis. Patient advised in  to call clinic with any cardiac related questions or concerns prior to his apt on 4/23/18.

## 2018-04-19 NOTE — TELEPHONE ENCOUNTER
"Hospital/TCU/ED for chronic condition Discharge Protocol    \"Hi, my name is Felicita Negrete, a registered nurse, and I am calling from [facility].  I am calling to follow up and see how things are going for you after your recent emergency visit/hospital/TCU stay.\"    Tell me how you are doing now that you are home?\" Patient stated, \"I feel OK.\"  Breathing improving each day.  Denies fever or CP.        Discharge Instructions    \"Let's review your discharge instructions.  What is/are the follow-up recommendations?  Pt. Response: Follow up with Dr. Elizalde     \"Has an appointment with your primary care provider been scheduled?\"   Yes. (confirm)  Scheduled appointment on 5/1, offered patient to be seen sooner but declined     \"When you see the provider, I would recommend that you bring your medications with you.\"    Medications    \"Tell me what changed about your medicines when you discharged?\"    Changes to chronic meds?    0-1    \"What questions do you have about your medications?\"    None     New diagnoses of heart failure, COPD, diabetes, or MI?    No     On insulin: \"Did you start on insulin in the hospital or did you have your insulin dose changed?\"  No         Medication reconciliation completed? Yes  Was MTM referral placed (*Make sure to put transitions as reason for referral)?   No    Call Summary    \"What questions or concerns do you have about your recent visit and your follow-up care?\"     none    \"If you have questions or things don't continue to improve, we encourage you contact us through the main clinic number (give number).  Even if the clinic is not open, triage nurses are available 24/7 to help you.     We would like you to know that our clinic has extended hours (provide information).  We also have urgent care (provide details on closest location and hours/contact info)\"      \"Thank you for your time and take care!\"             "

## 2018-04-20 NOTE — UTILIZATION REVIEW
"Admission Status; Secondary Review Determination     Under the authority of the Utilization Management Committee, the utilization review process indicated a secondary review on the above patient.  The review outcome is based on review of the medical records, discussions with staff, and applying clinical experience noted on the date of the review.       (x) Observation Status Appropriate - This patient does not meet hospital inpatient criteria and is placed in observation status. If this patient's primary payer is Medicare and was admitted as an inpatient, Condition Code 44 should be used and patient status changed to \"observation\".     RATIONALE FOR DETERMINATION: 77-year-old male who has had a cold with a cough productive of phlegm for about 6 days.  The patient went to the [facility] today, 04/16/2018, where he had a chief complaint of shortness of breath and some chest tightness. his chest tightness only occurs when he is coughing and he does not have chest tightness when he is not coughing.  Due to patient's history of coronary artery disease  with a detectable troponin level, patient is appropriate for observation care to rule out an atypical presentation of acute coronary syndrome.    The severity of illness, intensity of service provided, expected LOS and risk for adverse outcome make the care appropriate for further observation; however, doesn't meet criteria for hospital inpatient admission. This was discussed with attending physician who concurred with this determination.    The information on this document is developed by the utilization review team in order for the business office to ensure compliance.  This only denotes the appropriateness of proper admission status and does not reflect the quality of care rendered.         The definitions of Inpatient Status and Observation Status used in making the determination above are those provided in the CMS Coverage Manual, Chapter 1 and Chapter 6, section " 70.4.      Sincerely,     Mark Arias MD    Physician Advisor  Utilization Review/ Case Management  [facility].

## 2018-04-23 ENCOUNTER — OFFICE VISIT (OUTPATIENT)
Dept: CARDIOLOGY | Facility: CLINIC | Age: 78
End: 2018-04-23
Attending: PHYSICIAN ASSISTANT
Payer: COMMERCIAL

## 2018-04-23 VITALS
WEIGHT: 210.2 LBS | HEIGHT: 71 IN | BODY MASS INDEX: 29.43 KG/M2 | DIASTOLIC BLOOD PRESSURE: 74 MMHG | HEART RATE: 54 BPM | SYSTOLIC BLOOD PRESSURE: 129 MMHG

## 2018-04-23 DIAGNOSIS — I48.19 PERSISTENT ATRIAL FIBRILLATION (H): ICD-10-CM

## 2018-04-23 DIAGNOSIS — I27.20 PULMONARY HYPERTENSION (H): ICD-10-CM

## 2018-04-23 DIAGNOSIS — E78.5 HYPERLIPIDEMIA LDL GOAL <70: Primary | ICD-10-CM

## 2018-04-23 PROCEDURE — 93000 ELECTROCARDIOGRAM COMPLETE: CPT | Performed by: INTERNAL MEDICINE

## 2018-04-23 PROCEDURE — 99214 OFFICE O/P EST MOD 30 MIN: CPT | Performed by: INTERNAL MEDICINE

## 2018-04-23 NOTE — PROGRESS NOTES
Service Date: 04/23/2018      HISTORY OF PRESENT ILLNESS:  Thank you for allowing me to participate in the care of your very delightful patient.  As you know, Mr. Perea is a 77-year-old gentleman with a history of stable CAD, status post coronary artery bypass more than 20 years ago, whom I had the pleasure of meeting for the first time almost 3 years ago for new onset of atrial tachyarrhythmias that appeared to be atrial fibrillation, with questionable symptoms of decrease in exercise tolerance.  In light of that, I recommended DC cardioversion and reassess with a Zio Patch monitor to see if his symptoms correlate with his monitor.  Pascual mentioned that his symptoms are much better with improvement in stamina after restoring sinus rhythm.  Subsequent Zio Patch monitor did not show recurrence of atrial fibrillation but some episodes of nonsustained VT, but we elected to monitor given he had normal ejection fraction.      Recently he had a cough along with chest discomfort especially when he coughed.  He was noted to have a small troponin rise along with some EKG changes.  In light of that, he was requested to be hospitalized, where he saw my partner, Dr. Mccain.  The patient was noted to be in atrial fibrillation with controlled ventricular response at that time.  He has been on Eliquis over the past couple of years now.      Since being discharged from the hospital, he is feeling better with less coughing, but he still feels like his stamina is not what it was.  EKG today demonstrated atrial fibrillation, rate of 54 beats per minute.  The patient has been on metoprolol 50 mg twice a day for the past couple of years as well.  There have been no changes in that.      Certainly the lack of stamina could be the effect of his atrial fibrillation even though the rate is under control.  In light of that, I recommended DC cardioversion and reassess with another Zio Patch monitor.  It appears that the atrial fibrillation  could be induced by the recent viral illness, but on the other hand, it could be incidental findings as well.  Should the patient declare to feel much better in sinus rhythm, then we should pursue rhythm control strategy should the AFib recur in the future.  In the meantime, I would agree with continuing his Eliquis.  I will have the patient come back to see Joleen Marrero, one of our advanced practice providers, in a month, a couple weeks after the Zio Patch monitor, to discuss the results and actions going forward.      cc:   [place] [address]         IOANA OLVERA MD             D: 2018   T: 2018   MT: SVETLANA      Name:     JESÚS VASQUEZ   MRN:      [id number]        Account:      [id number]   :      1940           Service Date: 2018      Document: [id number]

## 2018-04-23 NOTE — LETTER
4/23/2018    Andrew Elizalde MD  [address] [facility] [address]    RE: Pascual Perea       Dear Colleague,    I had the pleasure of seeing Pascual Perea in the [facility].    Service Date: 04/23/2018      HISTORY OF PRESENT ILLNESS:  Thank you for allowing me to participate in the care of your very delightful patient.  As you know, Mr. Perea is a 77-year-old gentleman with a history of stable CAD, status post coronary artery bypass more than 20 years ago, whom I had the pleasure of meeting for the first time almost 3 years ago for new onset of atrial tachyarrhythmias that appeared to be atrial fibrillation, with questionable symptoms of decrease in exercise tolerance.  In light of that, I recommended DC cardioversion and reassess with a Zio Patch monitor to see if his symptoms correlate with his monitor.  Pascual mentioned that his symptoms are much better with improvement in stamina after restoring sinus rhythm.  Subsequent Zio Patch monitor did not show recurrence of atrial fibrillation but some episodes of nonsustained VT, but we elected to monitor given he had normal ejection fraction.      Recently he had a cough along with chest discomfort especially when he coughed.  He was noted to have a small troponin rise along with some EKG changes.  In light of that, he was requested to be hospitalized, where he saw my partner, Dr. Mccain.  The patient was noted to be in atrial fibrillation with controlled ventricular response at that time.  He has been on Eliquis over the past couple of years now.      Since being discharged from the hospital, he is feeling better with less coughing, but he still feels like his stamina is not what it was.  EKG today demonstrated atrial fibrillation, rate of 54 beats per minute.  The patient has been on metoprolol 50 mg twice a day for the past couple of years as well.  There have been no changes in that.      Certainly the lack of stamina could  be the effect of his atrial fibrillation even though the rate is under control.  In light of that, I recommended DC cardioversion and reassess with another Zio Patch monitor.  It appears that the atrial fibrillation could be induced by the recent viral illness, but on the other hand, it could be incidental findings as well.  Should the patient declare to feel much better in sinus rhythm, then we should pursue rhythm control strategy should the AFib recur in the future.  In the meantime, I would agree with continuing his Eliquis.  I will have the patient come back to see Joleen Marrero, one of our advanced practice providers, in a month, a couple weeks after the Zio Patch monitor, to discuss the results and actions going forward.      cc:   Andrew Elizalde MD    [facility]    [address]  [address]         IOANA OLVERA MD             D: 2018   T: 2018   MT: SVETLANA      Name:     JESÚS VASQUEZ   MRN:      [id number]        Account:      [id number]   :      1940           Service Date: 2018      Document: [id number]        Thank you for allowing me to participate in the care of your patient.      Sincerely,     Ioana Kasper MD     [facility] [place]    cc:   Rebekah Marrero PA-C  [address] [address]

## 2018-04-23 NOTE — LETTER
4/23/2018      Andrew Elizalde MD  [address] [facility] [address]      RE: Pascual Perea       Dear Colleague,    I had the pleasure of seeing Pascual Perea in the [facility].    Service Date: 04/23/2018      HISTORY OF PRESENT ILLNESS:  Thank you for allowing me to participate in the care of your very delightful patient.  As you know, Mr. Perea is a 77-year-old gentleman with a history of stable CAD, status post coronary artery bypass more than 20 years ago, whom I had the pleasure of meeting for the first time almost 3 years ago for new onset of atrial tachyarrhythmias that appeared to be atrial fibrillation, with questionable symptoms of decrease in exercise tolerance.  In light of that, I recommended DC cardioversion and reassess with a Zio Patch monitor to see if his symptoms correlate with his monitor.  Pascual mentioned that his symptoms are much better with improvement in stamina after restoring sinus rhythm.  Subsequent Zio Patch monitor did not show recurrence of atrial fibrillation but some episodes of nonsustained VT, but we elected to monitor given he had normal ejection fraction.      Recently he had a cough along with chest discomfort especially when he coughed.  He was noted to have a small troponin rise along with some EKG changes.  In light of that, he was requested to be hospitalized, where he saw my partner, Dr. Mccain.  The patient was noted to be in atrial fibrillation with controlled ventricular response at that time.  He has been on Eliquis over the past couple of years now.      Since being discharged from the hospital, he is feeling better with less coughing, but he still feels like his stamina is not what it was.  EKG today demonstrated atrial fibrillation, rate of 54 beats per minute.  The patient has been on metoprolol 50 mg twice a day for the past couple of years as well.  There have been no changes in that.      Certainly the lack of stamina  could be the effect of his atrial fibrillation even though the rate is under control.  In light of that, I recommended DC cardioversion and reassess with another Zio Patch monitor.  It appears that the atrial fibrillation could be induced by the recent viral illness, but on the other hand, it could be incidental findings as well.  Should the patient declare to feel much better in sinus rhythm, then we should pursue rhythm control strategy should the AFib recur in the future.  In the meantime, I would agree with continuing his Eliquis.  I will have the patient come back to see Joleen Marrero, one of our advanced practice providers, in a month, a couple weeks after the Zio Patch monitor, to discuss the results and actions going forward.      cc:   [place] [address]         IOANA OLVERA MD             D: 2018   T: 2018   MT: SVETLANA      Name:     JESÚS VASQUEZ   MRN:      [id number]        Account:      [id number]   :      1940           Service Date: 2018      Document: [id number]        Outpatient Encounter Prescriptions as of 2018   Medication Sig Dispense Refill     apixaban ANTICOAGULANT (ELIQUIS) 5 MG tablet Take 1 tablet (5 mg) by mouth 2 times daily 60 tablet 11     blood glucose monitoring (ONE TOUCH ULTRA) test strip Test twice daily with One Touch Ultra Blue 200 each 3     Cholecalciferol (VITAMIN D3 PO) Take 5,000 Units by mouth daily       cyanocolbalamin (VITAMIN  B-12) 1000 MCG tablet Take 5,000 mcg by mouth daily.       ferrous fumarate 65 mg, [place]. FE,-Vitamin C 125 mg (VITRON-C)  MG TABS tablet 1 tab twice a day for anemia 60 tablet 11     fluticasone (FLONASE) 50 MCG/ACT spray USE ONE TO TWO SPRAYS IN EACH NOSTRIL EVERY DAY 48 g 3     gemfibrozil (LOPID) 600 MG tablet TAKE ONE TABLET BY MOUTH TWICE A  tablet 1     GLUCOSE 4 GM OR CHEW as directed 30 11     insulin aspart (NOVOLOG FLEXPEN)  "100 UNIT/ML injection Inject 3 times daily (before meals). 2 units per carb choice (approx 5-10 units/meal) 30 mL 3     insulin detemir (LEVEMIR FLEXPEN/FLEXTOUCH) 100 UNIT/ML injection Inject 40-45 Units Subcutaneous At Bedtime 45 mL 3     insulin pen needle (NOVOFINE 30) 30G X 8 MM 1 Syringe 4 times daily 400 each 3     levothyroxine (SYNTHROID/LEVOTHROID) 125 MCG tablet TAKE ONE TABLET BY MOUTH EVERY DAY 90 tablet 3     lisinopril (PRINIVIL/ZESTRIL) 20 MG tablet TAKE ONE TABLET BY MOUTH EVERY DAY 90 tablet 3     loratadine (CLARITIN) 10 MG tablet TAKE ONE TABLET BY MOUTH EVERY DAY 90 tablet 3     metFORMIN (GLUCOPHAGE) 1000 MG tablet TAKE ONE TABLET BY MOUTH TWO TIMES A DAY WITH MEALS 60 tablet 4     metoprolol tartrate (LOPRESSOR) 50 MG tablet TAKE ONE TABLET BY MOUTH TWICE A  tablet 3     nitroglycerin (NITROSTAT) 0.4 MG SL tablet Place 1 tablet (0.4 mg) under the tongue every 5 minutes as needed for chest pain 25 tablet 1     ONETOUCH DELICA LANCETS 33G MISC 1 Device 3 times daily . Use to test blood sugars 3 times daily or as directed. 300 each prn     order for DME Equipment being ordered: 3ml syringes and 22G 1 1/2\" needles to use with testosterone inj every 2 weeks 30 Device 1     pravastatin (PRAVACHOL) 80 MG tablet TAKE ONE TABLET BY MOUTH EVERY NIGHT AT BEDTIME 90 tablet 1     ranitidine (ZANTAC) 150 MG tablet TAKE ONE TABLET BY MOUTH TWICE A  tablet 3     testosterone cypionate (DEPOTESTOTERONE) 200 MG/ML injection Inject 1.5 mLs (300 mg) into the muscle every 14 days 10 vial 1     levalbuterol (XOPENEX) 1.25 MG/3ML neb solution Take 3 mLs (1.25 mg) by nebulization once for 1 dose 3 mL      No facility-administered encounter medications on file as of 4/23/2018.        Again, thank you for allowing me to participate in the care of your patient.      Sincerely,    Satnam Kasper MD     [facility]    "

## 2018-04-23 NOTE — MR AVS SNAPSHOT
After Visit Summary   4/23/2018    Pascual Perea    MRN: [id number]           Patient Information     Date Of Birth          1940        Visit Information        Provider Department      4/23/2018 4:15 PM Satnam Jarvis MD [facility]        Today's Diagnoses     Hyperlipidemia LDL goal <70    -  1    Persistent atrial fibrillation (H)        Pulmonary hypertension           Follow-ups after your visit        Additional Services     Follow-Up with Cardiac Advanced Practice Provider                 Your next 10 appointments already scheduled     May 01, 2018  2:00 PM CDT   Office Visit with Andrew Elizalde MD   [facility] ([facility])    [address] [phone]           Bring a current list of meds and any records pertaining to this visit. For Physicals, please bring immunization records and any forms needing to be filled out. Please arrive 10 minutes early to complete paperwork.            May 25, 2018 10:30 AM CDT   [facility] EP RETURN with Rebekah Marrero PA-C   [facility] ([facility])    [address] [place] [id number] [phone] OPT 2              Who to contact     If you have questions or need follow up information about today's clinic visit or your schedule please contact [facility] directly at [phone].  Normal or non-critical lab and imaging results will be communicated to you by MyChart, letter or phone within 4 business days after the clinic has received the results. If you do not hear from us within 7 days, please contact the clinic through MyChart or phone. If you have a critical or abnormal lab result, we will notify you by phone as soon as possible.  Submit refill requests through [employer] or call your pharmacy and they will forward the  "refill request to us. Please allow 3 business days for your refill to be completed.          Additional Information About Your Visit        MyChart Information     [employer] gives you secure access to your electronic health record. If you see a primary care provider, you can also send messages to your care team and make appointments. If you have questions, please call your primary care clinic.  If you do not have a primary care provider, please call [phone] and they will assist you.        Care EveryWhere ID     This is your Care EveryWhere ID. This could be used by other organizations to access your [facility] medical records  [id number]        Your Vitals Were     Pulse Height BMI (Body Mass Index)             54 1.803 m (5' 11\") 29.32 kg/m2          Blood Pressure from Last 3 Encounters:   04/24/18 142/80   04/23/18 129/74   04/18/18 132/70    Weight from Last 3 Encounters:   04/23/18 95.3 kg (210 lb 3.2 oz)   04/18/18 93.4 kg (206 lb)   04/16/18 96.6 kg (213 lb)              We Performed the Following     EKG 12-lead complete w/read - Clinics (performed today)     Follow-Up with Electrophysiologist        Primary Care Provider Office Phone # Fax #    Andrew Elizalde [phone]       [address] [facility] [address]        Equal Access to Services     AMY FLORES AH: Hadii aad ku hadasho Soomaali, waaxda luqadaha, qaybta kaalmada caridad, suhail cardenas. So [facility] [phone].    ATENCIÓN: Si habla español, tiene a cooley disposición servicios gratuitos de asistencia lingüística. Llame al [phone].    We comply with applicable federal civil rights laws and [place] laws. We do not discriminate on the basis of race, color, national origin, age, disability, sex, sexual orientation, or gender identity.            Thank you!     Thank you for choosing [facility]  for your care. Our goal is always to provide you with excellent " care. Hearing back from our patients is one way we can continue to improve our services. Please take a few minutes to complete the written survey that you may receive in the mail after your visit with us. Thank you!             Your Updated Medication List - Protect others around you: Learn how to safely use, store and throw away your medicines at www.disposemymeds.org.          This list is accurate as of 4/23/18 11:59 PM.  Always use your most recent med list.                   Brand Name Dispense Instructions for use Diagnosis    apixaban ANTICOAGULANT 5 MG tablet    ELIQUIS    60 tablet    Take 1 tablet (5 mg) by mouth 2 times daily    Typical atrial flutter (H)       blood glucose monitoring test strip    ONETOUCH ULTRA    200 each    Test twice daily with One Touch Ultra Blue    Type 2 diabetes mellitus without complication, with long-term current use of insulin (H)       cyanocobalamin 1000 MCG tablet    vitamin  B-12     Take 5,000 mcg by mouth daily.        ferrous fumarate 65 mg ([place]. FE)-Vitamin C 125 mg  MG Tabs tablet    VITRON-C    60 tablet    1 tab twice a day for anemia    Iron deficiency anemia, unspecified iron deficiency anemia type       fluticasone 50 MCG/ACT spray    FLONASE    48 g    USE ONE TO TWO SPRAYS IN EACH NOSTRIL EVERY DAY    Chronic allergic rhinitis, unspecified seasonality, unspecified trigger       gemfibrozil 600 MG tablet    LOPID    180 tablet    TAKE ONE TABLET BY MOUTH TWICE A DAY    Hyperlipidemia LDL goal <70       glucose 4 g Chew chewable tablet     30    as directed    Type II or unspecified type diabetes mellitus without mention of complication, not stated as uncontrolled       insulin aspart 100 UNIT/ML injection    NovoLOG FLEXPEN    30 mL    Inject 3 times daily (before meals). 2 units per carb choice (approx 5-10 units/meal)    Type 2 diabetes mellitus without complication, with long-term current use of insulin (H)       insulin detemir 100 UNIT/ML injection  "   LEVEMIR FLEXPEN/FLEXTOUCH    45 mL    Inject 40-45 Units Subcutaneous At Bedtime    Type 2 diabetes mellitus without complication, with long-term current use of insulin (H)       insulin pen needle 30G X 8 MM    NOVOFINE 30    400 each    1 Syringe 4 times daily    Type 2 diabetes mellitus without complication, with long-term current use of insulin (H)       levalbuterol 1.25 MG/3ML neb solution    XOPENEX    3 mL    Take 3 mLs (1.25 mg) by nebulization once for 1 dose    Wheezing, SOB (shortness of breath)       levothyroxine 125 MCG tablet    SYNTHROID/LEVOTHROID    90 tablet    TAKE ONE TABLET BY MOUTH EVERY DAY    Hypothyroidism, unspecified type       lisinopril 20 MG tablet    PRINIVIL/ZESTRIL    90 tablet    TAKE ONE TABLET BY MOUTH EVERY DAY    Essential hypertension, benign       loratadine 10 MG tablet    CLARITIN    90 tablet    TAKE ONE TABLET BY MOUTH EVERY DAY    Chronic allergic rhinitis, unspecified seasonality, unspecified trigger       metFORMIN 1000 MG tablet    GLUCOPHAGE    60 tablet    TAKE ONE TABLET BY MOUTH TWO TIMES A DAY WITH MEALS    Type 2 diabetes mellitus without complication (H)       metoprolol tartrate 50 MG tablet    LOPRESSOR    180 tablet    TAKE ONE TABLET BY MOUTH TWICE A DAY    Essential hypertension, benign       nitroGLYcerin 0.4 MG sublingual tablet    NITROSTAT    25 tablet    Place 1 tablet (0.4 mg) under the tongue every 5 minutes as needed for chest pain    Unstable angina (H)       ONETOUCH DELICA LANCETS 33G Misc     300 each    1 Device 3 times daily . Use to test blood sugars 3 times daily or as directed.    Type 2 diabetes, HbA1C goal < 8% (H)       order for DME     30 Device    Equipment being ordered: 3ml syringes and 22G 1 1/2\" needles to use with testosterone inj every 2 weeks    Testosterone deficiency       pravastatin 80 MG tablet    PRAVACHOL    90 tablet    TAKE ONE TABLET BY MOUTH EVERY NIGHT AT BEDTIME    Hyperlipidemia LDL goal <70       ranitidine " 150 MG tablet    ZANTAC    180 tablet    TAKE ONE TABLET BY MOUTH TWICE A DAY    Gastroesophageal reflux disease without esophagitis       testosterone cypionate 200 MG/ML injection    DEPOTESTOTERONE    10 vial    Inject 1.5 mLs (300 mg) into the muscle every 14 days    Testosterone deficiency       VITAMIN D3 PO      Take 5,000 Units by mouth daily

## 2018-04-24 ENCOUNTER — ANESTHESIA EVENT (OUTPATIENT)
Dept: SURGERY | Facility: CLINIC | Age: 78
End: 2018-04-24
Payer: MEDICARE

## 2018-04-24 ENCOUNTER — HOSPITAL ENCOUNTER (OUTPATIENT)
Dept: SURGERY | Facility: CLINIC | Age: 78
End: 2018-04-24
Attending: INTERNAL MEDICINE | Admitting: INTERNAL MEDICINE
Payer: MEDICARE

## 2018-04-24 ENCOUNTER — ANESTHESIA (OUTPATIENT)
Dept: SURGERY | Facility: CLINIC | Age: 78
End: 2018-04-24
Payer: MEDICARE

## 2018-04-24 ENCOUNTER — HOSPITAL ENCOUNTER (OUTPATIENT)
Dept: CARDIOLOGY | Facility: CLINIC | Age: 78
End: 2018-04-24
Attending: INTERNAL MEDICINE | Admitting: INTERNAL MEDICINE
Payer: MEDICARE

## 2018-04-24 ENCOUNTER — TRANSFERRED RECORDS (OUTPATIENT)
Dept: HEALTH INFORMATION MANAGEMENT | Facility: CLINIC | Age: 78
End: 2018-04-24

## 2018-04-24 ENCOUNTER — HOSPITAL ENCOUNTER (OUTPATIENT)
Facility: CLINIC | Age: 78
Discharge: HOME | End: 2018-04-24
Attending: INTERNAL MEDICINE | Admitting: INTERNAL MEDICINE
Payer: MEDICARE

## 2018-04-24 VITALS
DIASTOLIC BLOOD PRESSURE: 80 MMHG | SYSTOLIC BLOOD PRESSURE: 142 MMHG | OXYGEN SATURATION: 96 % | TEMPERATURE: 97.5 F | RESPIRATION RATE: 15 BRPM

## 2018-04-24 DIAGNOSIS — I48.19 PERSISTENT ATRIAL FIBRILLATION (H): ICD-10-CM

## 2018-04-24 LAB
GLUCOSE SERPL-MCNC: 84 MG/DL (ref 70–99)
MAGNESIUM SERPL-MCNC: 1.7 MG/DL (ref 1.6–2.3)
POTASSIUM SERPL-SCNC: 4 MMOL/L (ref 3.4–5.3)

## 2018-04-24 PROCEDURE — 84132 ASSAY OF SERUM POTASSIUM: CPT | Performed by: INTERNAL MEDICINE

## 2018-04-24 PROCEDURE — 25000125 ZZHC RX 250: Performed by: NURSE ANESTHETIST, CERTIFIED REGISTERED

## 2018-04-24 PROCEDURE — 25000125 ZZHC RX 250: Performed by: ANESTHESIOLOGY

## 2018-04-24 PROCEDURE — 0298T ZZC EXT ECG > 48HR TO 21 DAY REVIEW AND INTERPRETATN: CPT | Performed by: INTERNAL MEDICINE

## 2018-04-24 PROCEDURE — 92960 CARDIOVERSION ELECTRIC EXT: CPT | Performed by: INTERNAL MEDICINE

## 2018-04-24 PROCEDURE — 25000128 H RX IP 250 OP 636: Performed by: ANESTHESIOLOGY

## 2018-04-24 PROCEDURE — 82947 ASSAY GLUCOSE BLOOD QUANT: CPT | Performed by: INTERNAL MEDICINE

## 2018-04-24 PROCEDURE — 83735 ASSAY OF MAGNESIUM: CPT | Performed by: INTERNAL MEDICINE

## 2018-04-24 PROCEDURE — 36415 COLL VENOUS BLD VENIPUNCTURE: CPT | Performed by: INTERNAL MEDICINE

## 2018-04-24 PROCEDURE — 25000132 ZZH RX MED GY IP 250 OP 250 PS 637: Mod: GY | Performed by: ANESTHESIOLOGY

## 2018-04-24 PROCEDURE — 37000008 ZZH ANESTHESIA TECHNICAL FEE, 1ST 30 MIN

## 2018-04-24 PROCEDURE — 25000128 H RX IP 250 OP 636: Performed by: NURSE ANESTHETIST, CERTIFIED REGISTERED

## 2018-04-24 PROCEDURE — 0296T ZIO PATCH HOLTER: CPT

## 2018-04-24 PROCEDURE — 25000125 ZZHC RX 250: Performed by: INTERNAL MEDICINE

## 2018-04-24 PROCEDURE — 92960 CARDIOVERSION ELECTRIC EXT: CPT

## 2018-04-24 PROCEDURE — 40000010 ZZH STATISTIC ANES STAT CODE-CRNA PER MINUTE

## 2018-04-24 RX ORDER — ALBUTEROL SULFATE 0.83 MG/ML
2.5 SOLUTION RESPIRATORY (INHALATION) EVERY 4 HOURS PRN
Status: DISCONTINUED | OUTPATIENT
Start: 2018-04-24 | End: 2018-04-25 | Stop reason: HOSPADM

## 2018-04-24 RX ORDER — ETOMIDATE 2 MG/ML
INJECTION INTRAVENOUS PRN
Status: DISCONTINUED | OUTPATIENT
Start: 2018-04-24 | End: 2018-04-24

## 2018-04-24 RX ORDER — PROPOFOL 10 MG/ML
INJECTION, EMULSION INTRAVENOUS PRN
Status: DISCONTINUED | OUTPATIENT
Start: 2018-04-24 | End: 2018-04-24

## 2018-04-24 RX ORDER — SODIUM CHLORIDE, SODIUM LACTATE, POTASSIUM CHLORIDE, CALCIUM CHLORIDE 600; 310; 30; 20 MG/100ML; MG/100ML; MG/100ML; MG/100ML
500 INJECTION, SOLUTION INTRAVENOUS CONTINUOUS
Status: DISCONTINUED | OUTPATIENT
Start: 2018-04-24 | End: 2018-04-25 | Stop reason: HOSPADM

## 2018-04-24 RX ORDER — ONDANSETRON 2 MG/ML
4 INJECTION INTRAMUSCULAR; INTRAVENOUS EVERY 30 MIN PRN
Status: DISCONTINUED | OUTPATIENT
Start: 2018-04-24 | End: 2018-04-25 | Stop reason: HOSPADM

## 2018-04-24 RX ORDER — ONDANSETRON 4 MG/1
4 TABLET, ORALLY DISINTEGRATING ORAL EVERY 30 MIN PRN
Status: DISCONTINUED | OUTPATIENT
Start: 2018-04-24 | End: 2018-04-25 | Stop reason: HOSPADM

## 2018-04-24 RX ORDER — MEPERIDINE HYDROCHLORIDE 25 MG/ML
12.5 INJECTION INTRAMUSCULAR; INTRAVENOUS; SUBCUTANEOUS
Status: DISCONTINUED | OUTPATIENT
Start: 2018-04-24 | End: 2018-04-25 | Stop reason: HOSPADM

## 2018-04-24 RX ORDER — POTASSIUM CHLORIDE 1500 MG/1
40 TABLET, EXTENDED RELEASE ORAL
Status: DISCONTINUED | OUTPATIENT
Start: 2018-04-24 | End: 2018-04-25 | Stop reason: HOSPADM

## 2018-04-24 RX ORDER — SODIUM CHLORIDE, SODIUM LACTATE, POTASSIUM CHLORIDE, CALCIUM CHLORIDE 600; 310; 30; 20 MG/100ML; MG/100ML; MG/100ML; MG/100ML
INJECTION, SOLUTION INTRAVENOUS CONTINUOUS
Status: DISCONTINUED | OUTPATIENT
Start: 2018-04-24 | End: 2018-04-25 | Stop reason: HOSPADM

## 2018-04-24 RX ORDER — NALOXONE HYDROCHLORIDE 0.4 MG/ML
.1-.4 INJECTION, SOLUTION INTRAMUSCULAR; INTRAVENOUS; SUBCUTANEOUS
Status: DISCONTINUED | OUTPATIENT
Start: 2018-04-24 | End: 2018-04-25 | Stop reason: HOSPADM

## 2018-04-24 RX ORDER — POTASSIUM CHLORIDE 1500 MG/1
20 TABLET, EXTENDED RELEASE ORAL
Status: DISCONTINUED | OUTPATIENT
Start: 2018-04-24 | End: 2018-04-25 | Stop reason: HOSPADM

## 2018-04-24 RX ORDER — FLUMAZENIL 0.1 MG/ML
0.2 INJECTION, SOLUTION INTRAVENOUS
Status: DISCONTINUED | OUTPATIENT
Start: 2018-04-24 | End: 2018-04-25 | Stop reason: HOSPADM

## 2018-04-24 RX ORDER — METOPROLOL TARTRATE 50 MG
50 TABLET ORAL ONCE
Status: COMPLETED | OUTPATIENT
Start: 2018-04-24 | End: 2018-04-24

## 2018-04-24 RX ORDER — ATROPINE SULFATE 0.1 MG/ML
.5-1 INJECTION INTRAVENOUS
Status: DISCONTINUED | OUTPATIENT
Start: 2018-04-24 | End: 2018-04-25 | Stop reason: HOSPADM

## 2018-04-24 RX ADMIN — METOPROLOL TARTRATE 50 MG: 50 TABLET, FILM COATED ORAL at 09:38

## 2018-04-24 RX ADMIN — Medication 2 G: at 10:58

## 2018-04-24 RX ADMIN — LIDOCAINE HYDROCHLORIDE 0.2 ML: 10 INJECTION, SOLUTION EPIDURAL; INFILTRATION; INTRACAUDAL; PERINEURAL at 09:22

## 2018-04-24 RX ADMIN — SODIUM CHLORIDE, POTASSIUM CHLORIDE, SODIUM LACTATE AND CALCIUM CHLORIDE 500 ML: 600; 310; 30; 20 INJECTION, SOLUTION INTRAVENOUS at 09:22

## 2018-04-24 RX ADMIN — ETOMIDATE 4 MG: 2 INJECTION, SOLUTION INTRAVENOUS at 10:33

## 2018-04-24 RX ADMIN — PROPOFOL 70 MG: 10 INJECTION, EMULSION INTRAVENOUS at 10:33

## 2018-04-24 ASSESSMENT — ENCOUNTER SYMPTOMS: DYSRHYTHMIAS: 1

## 2018-04-24 NOTE — ANESTHESIA CARE TRANSFER NOTE
Patient: Pascual Perea    * No procedures listed *    Diagnosis: * No pre-op diagnosis entered *  Diagnosis Additional Information: No value filed.    Anesthesia Type:   General     Note:  Airway :Nasal Cannula  Patient transferred to:PACU  Comments: VSS. Airway and IV patent. Patient comfortable. Report to RN. Stable care transfer.Handoff Report: Identifed the Patient, Identified the Reponsible Provider, Reviewed the pertinent medical history, Discussed the surgical course, Reviewed Intra-OP anesthesia mangement and issues during anesthesia, Set expectations for post-procedure period and Allowed opportunity for questions and acknowledgement of understanding      Vitals: (Last set prior to Anesthesia Care Transfer)    CRNA VITALS  4/24/2018 1014 - 4/24/2018 1045      4/24/2018             Pulse: 60    SpO2: 99 %    Resp Rate (set): 10                Electronically Signed By: JAKI Khan CRNA  April 24, 2018  10:45 AM

## 2018-04-24 NOTE — OP NOTE
Procedure Date: 2018      CARDIOVERSION       DATE OF PROCEDURE:   2018      INDICATIONS:  This is a gentleman with persistent atrial fibrillation.  He is on Eliquis.  Magnesium is 1.7 today but potassium was normal.  Magnesium will be replaced.  Pre cardioversion heart rate was between 40-50.  The patient received an extra dose of beta-blockers in the PACU today.        PROCEDURE:  After anesthesia was expertly administered a single synchronized shock of 120 joules restored normal sinus rhythm.  The patient tolerated the procedure well.  Post-cardioversion heart rate was between 50-60.  The patient has been asked to withhold his beta-blocker dose for tonight.  He will follow up with Dr. Jarvis.         MARY CONNORS MD, [facility]             D: 2018   T: 2018   MT: ANGELIC      Name:     JESÚS VASQUEZ   MRN:      [id number]        Account:        [id number]   :      1940           Procedure Date: 2018      Document: [id number]

## 2018-04-24 NOTE — ANESTHESIA POSTPROCEDURE EVALUATION
Patient: Pascual Perea    * No procedures listed *    Diagnosis:* No pre-op diagnosis entered *  Diagnosis Additional Information: No value filed.    Anesthesia Type:  General    Note:  Anesthesia Post Evaluation    Patient location during evaluation: PACU  Patient participation: Able to fully participate in evaluation  Level of consciousness: awake  Pain management: adequate  Airway patency: patent  Cardiovascular status: acceptable  Respiratory status: acceptable  Hydration status: acceptable  PONV: controlled     Anesthetic complications: None          Last vitals:  Vitals:    04/24/18 1210 04/24/18 1215 04/24/18 1220   BP: 136/77 138/79 142/80   Resp: 16 24 15   Temp:      SpO2: 96% 95% 96%         Electronically Signed By: Rhianna Corey MD  April 24, 2018  12:52 PM

## 2018-04-24 NOTE — ANESTHESIA PREPROCEDURE EVALUATION
Anesthesia Evaluation     . Pt has had prior anesthetic.     No history of anesthetic complications          ROS/MED HX    ENT/Pulmonary:     (+)sleep apnea, doesn't use CPAP , . .    Neurologic: Comment: [place]      Cardiovascular:     (+) Dyslipidemia, hypertension--CAD, -CABG-date: 20yrs ago, . Taking blood thinners : . . PEREZ, . :. dysrhythmias a-fib, Other and a-flutter, Irregular Heartbeat/Palpitations, . pulmonary hypertension, Previous cardiac testing Echodate:4/18/18results:Interpretation Summary     The left ventricle is normal in size. There is normal left ventricular wall  thickness. Left ventricular systolic function is normal. The visual ejection  fraction is estimated at 55-60%. Left ventricular diastolic function is  indeterminate. No regional wall motion abnormalities noted.  The right ventricle is mildly dilated. Mildly decreased right ventricular  systolic function.  There is mild-moderate biatrial enlargement. There is no color Doppler  evidence of an atrial shunt.  Trace mitral and tricuspid regurgitation.  No pericardial effusion.  In direct comparison to the previous study dated 05/20/2016, the findings are  similar.  _____________________________________________________________________________Stress Testdate:4/18/18 results:Impression  1. Myocardial perfusion imaging using single isotope technique  demonstrated a small basal inferior infarct with small area of mild  ischemia in the inferior mid ventricle  There is a very small possible apical nontransmural infarct with  minimal mikey-infarct ischemia (previously described).   2. Gated images demonstrated basal inferior hypokinesis. The left  ventricular systolic function is 72% at rest and 50% post stress.  3. Compared to the prior study from 4/15/2016, prior study described a  very small fixed apical defect possibly representing nontransmural  infarct with an ejection fraction of 61%. date: results: date: results:          METS/Exercise  Tolerance:     Hematologic:  - neg hematologic  ROS       Musculoskeletal:  - neg musculoskeletal ROS       GI/Hepatic:     (+) GERD Asymptomatic on medication,       Renal/Genitourinary:  - ROS Renal section negative       Endo:     (+) type II DM Using insulin Diabetic complications: neuropathy, thyroid problem hypothyroidism, Obesity, .      Psychiatric:         Infectious Disease:  - neg infectious disease ROS       Malignancy:      - no malignancy   Other:                     Physical Exam  Normal systems: cardiovascular and pulmonary    Airway   Mallampati: II  TM distance: >3 FB  Neck ROM: full    Dental   (+) caps and missing    Cardiovascular   Rhythm and rate: irregular      Pulmonary    breath sounds clear to auscultation                    Anesthesia Plan      History & Physical Review  History and physical reviewed and following examination; no interval change.    ASA Status:  3 .    NPO Status:  > 8 hours    Plan for General with Intravenous induction.          Postoperative Care      Consents  Anesthetic plan, risks, benefits and alternatives discussed with:  Patient..                        Procedure: * No procedures listed *  Preop diagnosis: * No pre-op diagnosis entered *    Allergies   Allergen Reactions     Omeprazole      diarrhea     Past Medical History:   Diagnosis Date     Adhesive capsulitis of shoulder      Anemia 3/10/2014     Anemia, unspecified      Antiplatelet or antithrombotic long-term use      Arrhythmia     Atrial fib/flutter     CAD (coronary artery disease)      Hyperlipidemia LDL goal <70 5/26/2011     Hypertension      Hypothyroidism      Impotence of organic origin      Laceration of finger  June 2010     left thumb s/p sutures     Numbness and tingling     diabetic neuropathy bilateral feet     BRANDON (obstructive sleep apnea)     intolerant of CPAP, uses it occasionally.  Using mandibular device occasionally     Osteopenia      Pulmonary hypertension 4/6/2012      Sensorineural hearing loss, unspecified      Stented coronary artery 1997    CABG      Testosterone deficiency      Type 2 diabetes mellitus without complication  (goal A1C<8) 10/24/2015     Typical atrial flutter (H) 4/18/16     Unspecified hypothyroidism      Vitamin D deficiency 9/3/2011     Past Surgical History:   Procedure Laterality Date     C NONSPECIFIC PROCEDURE  1995    CABG ([facility])     C NONSPECIFIC PROCEDURE  8/01    stress echo     CARDIAC SURGERY      bypass in 1997     COLONOSCOPY       CORONARY ANGIOGRAPHY ADULT ORDER      4/2/2012     CORONARY ARTERY BYPASS  1997    [facility]     EXCISE MASS HEAD Left 8/18/2016    Procedure: EXCISE MASS HEAD;  Surgeon: Ivan Hernandez MD;  Location: [facility]     HEART CATH, ANGIOPLASTY  4/2012     PHACOEMULSIFICATION CLEAR CORNEA WITH STANDARD INTRAOCULAR LENS IMPLANT Left 6/8/2015    Procedure: PHACOEMULSIFICATION CLEAR CORNEA WITH STANDARD INTRAOCULAR LENS IMPLANT;  Surgeon: Nixon Farnsworth MD;  [facility]     PHACOEMULSIFICATION CLEAR CORNEA WITH STANDARD INTRAOCULAR LENS IMPLANT Right 6/22/2015    Procedure: PHACOEMULSIFICATION CLEAR CORNEA WITH STANDARD INTRAOCULAR LENS IMPLANT;  Surgeon: Nixon Farnsworth MD;  Location:  [facility]     SEPTOPLASTY, TURBINOPLASTY, COMBINED Bilateral 8/18/2016    Procedure: COMBINED SEPTOPLASTY, TURBINOPLASTY;  Surgeon: Ivan Hernandez MD;  Location: [facility]     Social History   Substance Use Topics     Smoking status: Former Smoker     Packs/day: 1.00     Years: 6.00     Quit date: 7/1/1964     Smokeless tobacco: Never Used      Comment: quit 1964     Alcohol use No     Prior to Admission medications    Medication Sig Start Date End Date Taking? Authorizing Provider   apixaban ANTICOAGULANT (ELIQUIS) 5 MG tablet Take 1 tablet (5 mg) by mouth 2 times daily 9/21/17   Andrew Elizalde MD   blood glucose monitoring (ONE TOUCH ULTRA) test strip Test twice daily with One Touch Ultra Blue 9/23/17    Andrew Elizalde MD   Cholecalciferol (VITAMIN D3 PO) Take 5,000 Units by mouth daily    Unknown, Entered By History   cyanocolbalamin (VITAMIN  B-12) 1000 MCG tablet Take 5,000 mcg by mouth daily. 8/31/11   Andrew Elizalde MD   ferrous fumarate 65 mg, [place]. FE,-Vitamin C 125 mg (VITRON-C)  MG TABS tablet 1 tab twice a day for anemia 9/21/17   Andrew Elizalde MD   fluticasone (FLONASE) 50 MCG/ACT spray USE ONE TO TWO SPRAYS IN EACH NOSTRIL EVERY DAY 1/31/18   Andrew Elizalde MD   gemfibrozil (LOPID) 600 MG tablet TAKE ONE TABLET BY MOUTH TWICE A DAY 3/7/18   Andrew Elizalde MD   GLUCOSE 4 GM OR CHEW as directed 2/10/06   Andrew Elizalde MD   insulin aspart (NOVOLOG FLEXPEN) 100 UNIT/ML injection Inject 3 times daily (before meals). 2 units per carb choice (approx 5-10 units/meal) 9/26/17   Andrew Elizalde MD   insulin detemir (LEVEMIR FLEXPEN/FLEXTOUCH) 100 UNIT/ML injection Inject 40-45 Units Subcutaneous At Bedtime 1/29/18   Andrew Elizalde MD   insulin pen needle (NOVOFINE 30) 30G X 8 MM 1 Syringe 4 times daily 9/23/17   Andrew Elizalde MD   levalbuterol (XOPENEX) 1.25 MG/3ML neb solution Take 3 mLs (1.25 mg) by nebulization once for 1 dose 4/16/18 4/16/18  Blake Rivas PA-C   levothyroxine (SYNTHROID/LEVOTHROID) 125 MCG tablet TAKE ONE TABLET BY MOUTH EVERY DAY 3/7/18   Andrew Elizalde MD   lisinopril (PRINIVIL/ZESTRIL) 20 MG tablet TAKE ONE TABLET BY MOUTH EVERY DAY 3/27/18   Andrew Elizalde MD   loratadine (CLARITIN) 10 MG tablet TAKE ONE TABLET BY MOUTH EVERY DAY 4/10/18   Andrew Elizalde MD   metFORMIN (GLUCOPHAGE) 1000 MG tablet TAKE ONE TABLET BY MOUTH TWO TIMES A DAY WITH MEALS 3/14/18   Andrew Elizalde MD   metoprolol tartrate (LOPRESSOR) 50 MG tablet TAKE ONE TABLET BY MOUTH TWICE A DAY 3/27/18   Andrew Elizalde MD   nitroglycerin (NITROSTAT) 0.4 MG SL tablet Place 1 tablet (0.4 mg) under the tongue every 5 minutes as needed for chest pain 7/8/14   Speedy Olivas MD   ONETOUCH DELICA LANCETS 33G MISC 1 Device 3 times  "daily . Use to test blood sugars 3 times daily or as directed. 5/9/14   Andrew Elizalde MD   order for DME Equipment being ordered: 3ml syringes and 22G 1 1/2\" needles to use with testosterone inj every 2 weeks 12/28/17   Andrew Elizalde MD   pravastatin (PRAVACHOL) 80 MG tablet TAKE ONE TABLET BY MOUTH EVERY NIGHT AT BEDTIME 3/7/18   Andrew Elizalde MD   ranitidine (ZANTAC) 150 MG tablet TAKE ONE TABLET BY MOUTH TWICE A DAY 1/31/18   Andrew Elizalde MD   testosterone cypionate (DEPOTESTOTERONE) 200 MG/ML injection Inject 1.5 mLs (300 mg) into the muscle every 14 days 12/28/17   Andrew Elizalde MD     Current Facility-Administered Medications Ordered in Epic   Medication Dose Route Frequency Last Rate Last Dose     atropine injection 0.5-1 mg  0.5-1 mg Intravenous Once PRN         flumazenil (ROMAZICON) injection 0.2 mg  0.2 mg Intravenous q1 min prn         lactated ringers infusion  500 mL Intravenous Continuous 25 mL/hr at 04/24/18 0922 500 mL at 04/24/18 0922     lidocaine 1 % 1 mL  1 mL Other Q1H PRN   0.2 mL at 04/24/18 0922     magnesium sulfate 2 g in NS intermittent infusion (PharMEDium or [facility] Cmpd)  2 g Intravenous Q1H PRN         naloxone (NARCAN) injection 0.1-0.4 mg  0.1-0.4 mg Intravenous Q2 Min PRN         potassium chloride SA (K-DUR/KLOR-CON M) CR tablet 20 mEq  20 mEq Oral Q1H PRN         potassium chloride SA (K-DUR/KLOR-CON M) CR tablet 40 mEq  40 mEq Oral Q1H PRN         sodium chloride (PF) 0.9% PF flush 3 mL  3 mL Intravenous Q1H PRN         sodium chloride (PF) 0.9% PF flush 3 mL  3 mL Intravenous Q8H PRN         sodium chloride (PF) 0.9% PF flush 3 mL  3 mL Intracatheter Q1H PRN         No current [facility]-ordered outpatient prescriptions on file.       lactated ringers 500 mL (04/24/18 0922)     Wt Readings from Last 1 Encounters:   04/23/18 95.3 kg (210 lb 3.2 oz)     Temp Readings from Last 1 Encounters:   04/24/18 36.4  C (97.5  F) (Tympanic)     BP Readings from Last 6 Encounters:   04/24/18 (!) " 145/95   04/23/18 129/74   04/18/18 132/70   04/16/18 135/70   01/29/18 114/72   09/21/17 116/70     Pulse Readings from Last 4 Encounters:   04/23/18 54   04/16/18 78   04/16/18 87   01/29/18 72     Resp Readings from Last 1 Encounters:   04/24/18 18     SpO2 Readings from Last 1 Encounters:   04/24/18 96%     Recent Labs   Lab Test  04/18/18   0550  04/16/18   1147   NA  137  139   POTASSIUM  3.6  4.4   CHLORIDE  103  105   CO2  26  26   ANIONGAP  8  8   GLC  70  120*   BUN  17  13   CR  0.92  1.05   STARR  8.8  9.3     Recent Labs   Lab Test  04/16/18   1147  02/12/18   0829   03/30/12 2005   AST  27  16   < >  28   ALT  12  14   < >  29   ALKPHOS  70  77   < >  52   BILITOTAL  1.1  1.0   < >  0.5   LIPASE   --    --    --   70    < > = values in this interval not displayed.     Recent Labs   Lab Test  04/17/18   0530  04/16/18   1647  04/16/18   1147   WBC   --   4.5  4.9   HGB   --   14.2  15.1   PLT  142*  148*  Platelets clumped, platelet count unavailable     Recent Labs   Lab Test  03/30/12 2005   ABO  A   RH   Neg     Recent Labs   Lab Test  04/18/18   0550  04/17/18   1205   03/30/12 2005   INR   --    --    --   0.94   PTT  52*  52*   < >   --     < > = values in this interval not displayed.      Recent Labs   Lab Test  04/17/18   1335  04/16/18   2231  04/16/18   1647   TROPI  0.052*  0.063*  0.052*     No results for input(s): PH, PCO2, PO2, HCO3 in the last 41922 hours.  No results for input(s): HCG in the last 84959 hours.  Recent Results (from the past 744 hour(s))   XR Chest Port 2 Views    Narrative    XR CHEST PORT 2 VW 4/16/2018 12:22 PM    HISTORY: Wheezing, short of breath.    COMPARISON: 4/15/2016    FINDINGS: No airspace consolidation, pleural effusion or pneumothorax.  Stable heart size. Mediastinal clips and sternal sutures appear  stable.      Impression    IMPRESSION: No acute cardiopulmonary abnormality.    LALO LINK MD   NM Lexiscan stress test (nuc card)    Narrative    GATED  MYOCARDIAL PERFUSION SCINTIGRAPHY WITH INTRAVENOUS PHARMACOLOGIC  VASODILATATION LEXISCAN -ONE DAY STUDY     4/18/2018 11:13 AM  JESÚS VASQUEZ  77 years  Male  1940.    Indication/Clinical History: Increased troponins    Impression  1.  Myocardial perfusion imaging using single isotope technique  demonstrated a small basal inferior infarct with small area of mild  ischemia in the inferior mid ventricle  There is a very small possible apical nontransmural infarct with  minimal mikey-infarct ischemia (previously described).   2. Gated images demonstrated basal inferior hypokinesis.  The left  ventricular systolic function is 72% at rest and 50% post stress.  3. Compared to the prior study from 4/15/2016, prior study described a  very small fixed apical defect possibly representing nontransmural  infarct with an ejection fraction of 61%.    Procedure  Pharmacologic stress testing was performed with Lexiscan at a rate of  0.08 mg/ml rapid bolus injection, for 15 seconds, 0.4 mg/5ml  intravenously. Low-level exercise was not performed along with the  vasodilator infusion.  The heart rate was 74 at baseline and refugio to  77 beats per minute during the Lexiscan infusion. The rest blood  pressure was 148/79 mmHg and was 147/79 mm Hg during Lexiscan  infusion. The patient experienced no chest pain  during the test.    Myocardial perfusion imaging was performed at rest, approximately 45  minutes after the injection intravenously of 11.3 mCi of Tc-99m  Myoview. At peak pharmacologic effect, 10-20 seconds after Lexiscan,   the patient was injected intravenously with 32.6 mCi of  Tc-99m  Myoview. The post-stress tomographic imaging was performed  approximately 60 minutes after stress.    EKG Findings  The resting EKG demonstrated atrial flutter with nonspecific ST-T  abnormalities. The stress EKG demonstrated occasional PVCs. No changes  in ST segments over baseline to suggest ischemia or injury.    Tomographic  Findings  Overall, the study quality is adequate . On the stress images, there  is a small area of decreased uptake at the apex. There is a decrease  in uptake the length of the inferior wall . On the rest images, there  is a trivial decrease in uptake at the apex and small area of mildly  decreased uptake at the inferior base . Gated images demonstrated  basal inferior hypokinesis The left ventricular ejection fraction was  calculated to be 50% post stress . . TID was is not seen with an index  of 1.19. Rotating planar images are reviewed and appropriately motion  corrected. Diaphragmatic/lateral soft tissue attenuation artifact is  seen which may impact on interpretation.    RAINA SAXENA MD       RECENT LABS:   ECG:   ECHO:     Procedure: * No procedures listed *  Preop diagnosis: * No pre-op diagnosis entered *    Allergies   Allergen Reactions     Omeprazole      diarrhea     Past Medical History:   Diagnosis Date     Adhesive capsulitis of shoulder      Anemia 3/10/2014     Anemia, unspecified      Antiplatelet or antithrombotic long-term use      Arrhythmia     Atrial fib/flutter     CAD (coronary artery disease)      Hyperlipidemia LDL goal <70 5/26/2011     Hypertension      Hypothyroidism      Impotence of organic origin      Laceration of finger  June 2010     left thumb s/p sutures     Numbness and tingling     diabetic neuropathy bilateral feet     BRANDON (obstructive sleep apnea)     intolerant of CPAP, uses it occasionally.  Using mandibular device occasionally     Osteopenia      Pulmonary hypertension 4/6/2012     Sensorineural hearing loss, unspecified      Stented coronary artery 1997    CABG      Testosterone deficiency      Type 2 diabetes mellitus without complication  (goal A1C<8) 10/24/2015     Typical atrial flutter (H) 4/18/16     Unspecified hypothyroidism      Vitamin D deficiency 9/3/2011     Past Surgical History:   Procedure Laterality Date     C NONSPECIFIC PROCEDURE  1995     CABG ([place])     C NONSPECIFIC PROCEDURE  8/01    stress echo     CARDIAC SURGERY      bypass in 1997     COLONOSCOPY       CORONARY ANGIOGRAPHY ADULT ORDER      4/2/2012     CORONARY ARTERY BYPASS  1997    [facility]     EXCISE MASS HEAD Left 8/18/2016    Procedure: EXCISE MASS HEAD;  Surgeon: Ivan Hernandez MD;  Location: [facility]     HEART CATH, ANGIOPLASTY  4/2012     PHACOEMULSIFICATION CLEAR CORNEA WITH STANDARD INTRAOCULAR LENS IMPLANT Left 6/8/2015    Procedure: PHACOEMULSIFICATION CLEAR CORNEA WITH STANDARD INTRAOCULAR LENS IMPLANT;  Surgeon: Nixon Farnsworth MD;  Location:  [facility]     PHACOEMULSIFICATION CLEAR CORNEA WITH STANDARD INTRAOCULAR LENS IMPLANT Right 6/22/2015    Procedure: PHACOEMULSIFICATION CLEAR CORNEA WITH STANDARD INTRAOCULAR LENS IMPLANT;  Surgeon: Nixon Farnsworth MD;  Location:  [facility]     SEPTOPLASTY, TURBINOPLASTY, COMBINED Bilateral 8/18/2016    Procedure: COMBINED SEPTOPLASTY, TURBINOPLASTY;  Surgeon: Ivan Hernandez MD;  Location: [facility]     Social History   Substance Use Topics     Smoking status: Former Smoker     Packs/day: 1.00     Years: 6.00     Quit date: 7/1/1964     Smokeless tobacco: Never Used      Comment: quit 1964     Alcohol use No     Prior to Admission medications    Medication Sig Start Date End Date Taking? Authorizing Provider   apixaban ANTICOAGULANT (ELIQUIS) 5 MG tablet Take 1 tablet (5 mg) by mouth 2 times daily 9/21/17   Andrew Elizalde MD   blood glucose monitoring (ONE TOUCH ULTRA) test strip Test twice daily with One Touch Ultra Blue 9/23/17   Andrew Elizalde MD   Cholecalciferol (VITAMIN D3 PO) Take 5,000 Units by mouth daily    Unknown, Entered By History   cyanocolbalamin (VITAMIN  B-12) 1000 MCG tablet Take 5,000 mcg by mouth daily. 8/31/11   Andrew Elizalde MD   ferrous fumarate 65 mg, [place]. FE,-Vitamin C 125 mg (VITRON-C)  MG TABS tablet 1 tab twice a day for anemia 9/21/17   Andrew Elizalde MD   fluticasone  "(FLONASE) 50 MCG/ACT spray USE ONE TO TWO SPRAYS IN EACH NOSTRIL EVERY DAY 1/31/18   Andrew Elizalde MD   gemfibrozil (LOPID) 600 MG tablet TAKE ONE TABLET BY MOUTH TWICE A DAY 3/7/18   Fide, Andrew STEPHEN MD   GLUCOSE 4 GM OR CHEW as directed 2/10/06   Fide, Andrew STEPHEN MD   insulin aspart (NOVOLOG FLEXPEN) 100 UNIT/ML injection Inject 3 times daily (before meals). 2 units per carb choice (approx 5-10 units/meal) 9/26/17   Fide, Andrew STEPHEN MD   insulin detemir (LEVEMIR FLEXPEN/FLEXTOUCH) 100 UNIT/ML injection Inject 40-45 Units Subcutaneous At Bedtime 1/29/18   Fide, Andrew STEPHEN MD   insulin pen needle (NOVOFINE 30) 30G X 8 MM 1 Syringe 4 times daily 9/23/17   Andrew Elizalde MD   levalbuterol (XOPENEX) 1.25 MG/3ML neb solution Take 3 mLs (1.25 mg) by nebulization once for 1 dose 4/16/18 4/16/18  Blake iRvas, PA-C   levothyroxine (SYNTHROID/LEVOTHROID) 125 MCG tablet TAKE ONE TABLET BY MOUTH EVERY DAY 3/7/18   Andrew Elizalde MD   lisinopril (PRINIVIL/ZESTRIL) 20 MG tablet TAKE ONE TABLET BY MOUTH EVERY DAY 3/27/18   Fide, Andrew STEPHEN MD   loratadine (CLARITIN) 10 MG tablet TAKE ONE TABLET BY MOUTH EVERY DAY 4/10/18   Andrew Elizalde MD   metFORMIN (GLUCOPHAGE) 1000 MG tablet TAKE ONE TABLET BY MOUTH TWO TIMES A DAY WITH MEALS 3/14/18   Andrew Elizalde MD   metoprolol tartrate (LOPRESSOR) 50 MG tablet TAKE ONE TABLET BY MOUTH TWICE A DAY 3/27/18   Andrew Elizalde MD   nitroglycerin (NITROSTAT) 0.4 MG SL tablet Place 1 tablet (0.4 mg) under the tongue every 5 minutes as needed for chest pain 7/8/14   Speedy Olivas MD   ONETOUCH DELICA LANCETS 33G MISC 1 Device 3 times daily . Use to test blood sugars 3 times daily or as directed. 5/9/14   Andrew Elizalde MD   order for DME Equipment being ordered: 3ml syringes and 22G 1 1/2\" needles to use with testosterone inj every 2 weeks 12/28/17   Andrew Elizalde MD   pravastatin (PRAVACHOL) 80 MG tablet TAKE ONE TABLET BY MOUTH EVERY NIGHT AT BEDTIME 3/7/18   Andrew Elizalde MD   ranitidine (ZANTAC) 150 MG " tablet TAKE ONE TABLET BY MOUTH TWICE A DAY 1/31/18   Andrew Elizalde MD   testosterone cypionate (DEPOTESTOTERONE) 200 MG/ML injection Inject 1.5 mLs (300 mg) into the muscle every 14 days 12/28/17   Andrew Elizalde MD     Current Facility-Administered Medications Ordered in Epic   Medication Dose Route Frequency Last Rate Last Dose     atropine injection 0.5-1 mg  0.5-1 mg Intravenous Once PRN         flumazenil (ROMAZICON) injection 0.2 mg  0.2 mg Intravenous q1 min prn         lactated ringers infusion  500 mL Intravenous Continuous 25 mL/hr at 04/24/18 0922 500 mL at 04/24/18 0922     lidocaine 1 % 1 mL  1 mL Other Q1H PRN   0.2 mL at 04/24/18 0922     magnesium sulfate 2 g in NS intermittent infusion ([employer] or [facility] C[facility])  2 g Intravenous Q1H PRN         naloxone (NARCAN) injection 0.1-0.4 mg  0.1-0.4 mg Intravenous Q2 Min PRN         potassium chloride SA (K-DUR/KLOR-CON M) CR tablet 20 mEq  20 mEq Oral Q1H PRN         potassium chloride SA (K-DUR/KLOR-CON M) CR tablet 40 mEq  40 mEq Oral Q1H PRN         sodium chloride (PF) 0.9% PF flush 3 mL  3 mL Intravenous Q1H PRN         sodium chloride (PF) 0.9% PF flush 3 mL  3 mL Intravenous Q8H PRN         sodium chloride (PF) 0.9% PF flush 3 mL  3 mL Intracatheter Q1H PRN         No current [facility]-ordered outpatient prescriptions on file.       lactated ringers 500 mL (04/24/18 0922)     Wt Readings from Last 1 Encounters:   04/23/18 95.3 kg (210 lb 3.2 oz)     Temp Readings from Last 1 Encounters:   04/24/18 36.4  C (97.5  F) (Tympanic)     BP Readings from Last 6 Encounters:   04/24/18 (!) 145/95   04/23/18 129/74   04/18/18 132/70   04/16/18 135/70   01/29/18 114/72   09/21/17 116/70     Pulse Readings from Last 4 Encounters:   04/23/18 54   04/16/18 78   04/16/18 87   01/29/18 72     Resp Readings from Last 1 Encounters:   04/24/18 18     SpO2 Readings from Last 1 Encounters:   04/24/18 96%     Recent Labs   Lab Test  04/24/18   0925  04/18/18   0550  04/16/18    1147   NA   --   137  139   POTASSIUM  4.0  3.6  4.4   CHLORIDE   --   103  105   CO2   --   26  26   ANIONGAP   --   8  8   GLC  84  70  120*   BUN   --   17  13   CR   --   0.92  1.05   STARR   --   8.8  9.3     Recent Labs   Lab Test  04/16/18   1147  02/12/18   0829   03/30/12 2005   AST  27  16   < >  28   ALT  12  14   < >  29   ALKPHOS  70  77   < >  52   BILITOTAL  1.1  1.0   < >  0.5   LIPASE   --    --    --   70    < > = values in this interval not displayed.     Recent Labs   Lab Test  04/17/18   0530  04/16/18   1647  04/16/18   1147   WBC   --   4.5  4.9   HGB   --   14.2  15.1   PLT  142*  148*  Platelets clumped, platelet count unavailable     Recent Labs   Lab Test  03/30/12 2005   ABO  A   RH   Neg     Recent Labs   Lab Test  04/18/18   0550  04/17/18   1205   03/30/12 2005   INR   --    --    --   0.94   PTT  52*  52*   < >   --     < > = values in this interval not displayed.      Recent Labs   Lab Test  04/17/18   1335  04/16/18   2231  04/16/18   1647   TROPI  0.052*  0.063*  0.052*     No results for input(s): PH, PCO2, PO2, HCO3 in the last 74929 hours.  No results for input(s): HCG in the last 79759 hours.  Recent Results (from the past 744 hour(s))   XR Chest Port 2 Views    Narrative    XR CHEST PORT 2 VW 4/16/2018 12:22 PM    HISTORY: Wheezing, short of breath.    COMPARISON: 4/15/2016    FINDINGS: No airspace consolidation, pleural effusion or pneumothorax.  Stable heart size. Mediastinal clips and sternal sutures appear  stable.      Impression    IMPRESSION: No acute cardiopulmonary abnormality.    LALO LINK MD   NM Lexiscan stress test (nuc card)    Narrative    GATED MYOCARDIAL PERFUSION SCINTIGRAPHY WITH INTRAVENOUS PHARMACOLOGIC  VASODILATATION LEXISCAN -ONE DAY STUDY     4/18/2018 11:13 AM  JESÚS VASQUEZ  77 years  Male  1940.    Indication/Clinical History: Increased troponins    Impression  1.  Myocardial perfusion imaging using single isotope  technique  demonstrated a small basal inferior infarct with small area of mild  ischemia in the inferior mid ventricle  There is a very small possible apical nontransmural infarct with  minimal mikey-infarct ischemia (previously described).   2. Gated images demonstrated basal inferior hypokinesis.  The left  ventricular systolic function is 72% at rest and 50% post stress.  3. Compared to the prior study from 4/15/2016, prior study described a  very small fixed apical defect possibly representing nontransmural  infarct with an ejection fraction of 61%.    Procedure  Pharmacologic stress testing was performed with Lexiscan at a rate of  0.08 mg/ml rapid bolus injection, for 15 seconds, 0.4 mg/5ml  intravenously. Low-level exercise was not performed along with the  vasodilator infusion.  The heart rate was 74 at baseline and refugio to  77 beats per minute during the Lexiscan infusion. The rest blood  pressure was 148/79 mmHg and was 147/79 mm Hg during Lexiscan  infusion. The patient experienced no chest pain  during the test.    Myocardial perfusion imaging was performed at rest, approximately 45  minutes after the injection intravenously of 11.3 mCi of Tc-99m  Myoview. At peak pharmacologic effect, 10-20 seconds after Lexiscan,   the patient was injected intravenously with 32.6 mCi of  Tc-99m  Myoview. The post-stress tomographic imaging was performed  approximately 60 minutes after stress.    EKG Findings  The resting EKG demonstrated atrial flutter with nonspecific ST-T  abnormalities. The stress EKG demonstrated occasional PVCs. No changes  in ST segments over baseline to suggest ischemia or injury.    Tomographic Findings  Overall, the study quality is adequate . On the stress images, there  is a small area of decreased uptake at the apex. There is a decrease  in uptake the length of the inferior wall . On the rest images, there  is a trivial decrease in uptake at the apex and small area of mildly  decreased  uptake at the inferior base . Gated images demonstrated  basal inferior hypokinesis The left ventricular ejection fraction was  calculated to be 50% post stress . . TID was is not seen with an index  of 1.19. Rotating planar images are reviewed and appropriately motion  corrected. Diaphragmatic/lateral soft tissue attenuation artifact is  seen which may impact on interpretation.    RAINA SAXENA MD       RECENT LABS:   ECG:   ECHO:

## 2018-05-01 ENCOUNTER — OFFICE VISIT (OUTPATIENT)
Dept: INTERNAL MEDICINE | Facility: CLINIC | Age: 78
End: 2018-05-01
Payer: COMMERCIAL

## 2018-05-01 VITALS
TEMPERATURE: 98.2 F | RESPIRATION RATE: 16 BRPM | OXYGEN SATURATION: 96 % | DIASTOLIC BLOOD PRESSURE: 86 MMHG | BODY MASS INDEX: 29.29 KG/M2 | HEART RATE: 78 BPM | SYSTOLIC BLOOD PRESSURE: 134 MMHG | WEIGHT: 210 LBS

## 2018-05-01 DIAGNOSIS — Z12.5 SCREENING FOR PROSTATE CANCER: ICD-10-CM

## 2018-05-01 DIAGNOSIS — I48.91 ATRIAL FIBRILLATION, UNSPECIFIED TYPE (H): ICD-10-CM

## 2018-05-01 DIAGNOSIS — I10 ESSENTIAL HYPERTENSION, BENIGN: Primary | ICD-10-CM

## 2018-05-01 DIAGNOSIS — E11.9 TYPE 2 DIABETES MELLITUS WITHOUT COMPLICATION, WITH LONG-TERM CURRENT USE OF INSULIN (H): ICD-10-CM

## 2018-05-01 DIAGNOSIS — J06.9 UPPER RESPIRATORY TRACT INFECTION, UNSPECIFIED TYPE: ICD-10-CM

## 2018-05-01 DIAGNOSIS — G47.33 OSA (OBSTRUCTIVE SLEEP APNEA): ICD-10-CM

## 2018-05-01 DIAGNOSIS — Z79.4 TYPE 2 DIABETES MELLITUS WITHOUT COMPLICATION, WITH LONG-TERM CURRENT USE OF INSULIN (H): ICD-10-CM

## 2018-05-01 DIAGNOSIS — E34.9 TESTOSTERONE DEFICIENCY: ICD-10-CM

## 2018-05-01 PROCEDURE — 99495 TRANSJ CARE MGMT MOD F2F 14D: CPT | Performed by: INTERNAL MEDICINE

## 2018-05-01 RX ORDER — LISINOPRIL 30 MG/1
30 TABLET ORAL DAILY
Qty: 90 TABLET | Refills: 3 | Status: SHIPPED | OUTPATIENT
Start: 2018-05-01 | End: 2018-09-13

## 2018-05-01 ASSESSMENT — PAIN SCALES - GENERAL: PAINLEVEL: NO PAIN (0)

## 2018-05-01 NOTE — PROGRESS NOTES
SUBJECTIVE:   Pascual Perea is a 77 year old male who presents to clinic today for the following health issues:    Hospital Follow-up Visit:    Hospital/Nursing Home/IP Rehab Facility: [facility]  Date of Admission: 04/16/2018  Date of Discharge: 04/18/2018  Reason(s) for Admission: ACS, Elevated Troponin, SOB            Problems taking medications regularly:  None       Medication changes since discharge: None       Problems adhering to non-medication therapy:  None    Summary of hospitalization:  [facility] discharge summary reviewed  Diagnostic Tests/Treatments reviewed.  Follow up needed: complete Ziopatch  Other Healthcare Providers Involved in Patient s Care:         cardiology  Update since discharge: improved.     Post Discharge Medication Reconciliation: discharge medications reconciled and changed, per note/orders (see AVS).  Plan of care communicated with patient and wife here today     Coding guidelines for this visit:  Type of Medical   Decision Making Face-to-Face Visit       within 7 Days of discharge Face-to-Face Visit        within 14 days of discharge   Moderate Complexity [id number]   High Complexity [id number]            Pt's past medical history, family history, habits, medications and allergies were reviewed with the patient today.  See snap shot for  HCM status. Most recent lab results reviewed with pt. Problem list and histories reviewed & adjusted, as indicated.  Additional history as below:    Cardioversion 4/24/18 and energy   better since then. Has Ziopatch on now monitoring for 14 days. On AC.  Denies any recent chest pain symptoms with activity.  No burning sensation  Still has cough though lessing. Mostly dry. Occ productive and clear/yellow.  Denies current shortness of breath   Rare nasal drainage. On Claritin  Has not checked blood sugars since home.  Previous overall diabetic control good.  History of sleep apnea.  Patient had not tolerated CPAP well  and then change to dental device which used for a period of time.  Then underwent sinus surgery and has not used either since then.  Denies known snoring now.  Energy good since had cardioversion     Additional ROS:   Constitutional, HEENT, Cardiovascular, Pulmonary, GI and , Neuro, MSK and Psych review of systems/symptoms are otherwise negative or unchanged from previous, except as noted above.      Hospital Course      Positive troponin likely in the setting of demand from URI  Dyspnea on exertion  history of coronary artery disease  Patient went to the [facility] on 04/16 with shortness of breath and some chest tightness and cough.  EKG showed A Fib with some T-wave inversions laterally, fairly similar to prior, but there is also slight ST depression in V6. Troponin of 0.048, sent to the ED. Chest x-ray negative for any infiltrate.   N-terminal pro BNP 1793. Troponin 0.048 > 0.052 >0.063  Patient really does not have classical chest pain, however,  has some substernal burning when he is coughing.       Echocardiogram showed EF of 55 to 60%, left ventricular diastolic function indeterminate. Mild to moderate by atrial enlargement.  Nuclear stress study - small area of inferior ischemia  cardiology team does not recommend any further evaluation in the absence of symptoms at this time. Follow-up with Dr. Olivas after he recovers from respiratory illness  Telemetry monitoring - no acute events  Continued PTA aspirin 325 mg daily during hospitalization, cardiology team recommend to discontinue aspirin as patient on apixiban and risk for bleeding.  Continued PTA pravastatin 80 mg at bedtime.  Continued PTA metoprolol 50 mg two times a day.      Viral URI  patient states has been having ongoing cough, nasal discomfort for a few days now.  WBC count 4.5, influenza A and B negative, chest x-ray no acute infiltrates  supportive care with adequate oral hydration, frequent handwashing.  Normal saline nasal drops,  lozenges  Continued PTA PRN loratadine.      Atrial fibrillation on chronic anticoagulation  Patient currently in a fib with rate control.  Denies any palpitations at this time.  Continued PTA Apixiban 5 mg by mouth two times daily.  Continued PTA metoprolol 50 mg two times a day.  Has an appointment with Dr. Jarvis this coming Monday to follow up on his atrial fibrillation.         Hypertension  Continued PTA lisinopril 20 mg oral daily.  Continued PTA metoprolol 50 mg two times a day.      Diabetes mellitus   Hemoglobin A1c 6.9  PTA metformin thousand milligrams two times a day restarted at the time of discharge.  Continue PTA insulin aspart two units per carbohydrate choice three times daily if blood sugars greater than hundred  PTA insulin detemir 40 to 45 units at bedtime. Lowered to 35 units during hospitalization as patient was NPO for procedure      Hypothyroidism  Continue PTA levothyroxine 125  g daily.  TSH in February 2018 0.41      Hyperlipidemia  prior lipid panel in 2017 per goal  Continue PTA gemfibrozil 600 mg daily, pravastatin 80 mg tablet daily.      GERD  Continue  mg ranitidine BID.      History of testosterone deficiency.   Patient receives testosterone injections every 14 days.  Follow-up as outpatient.      History of mild pulmonary hypertension seen on echocardiogram 2016.   History of obstructive sleep apnea not on CPAP.   Obesity with a BMI of 29.18  consider lifestyle modification with diet and exercise.  Consider sleep studies as outpatient.      History of vitamin D deficiency.   PTA On vitamin D replacement therapy with 5000 units daily.     # Discharge Pain Plan:   - Patient currently has NO PAIN and is not being prescribed pain medications on discharge.  Patient and interdisciplinary team involved in care and agree with discharge plan.  Anthony Cruz MD      Lab Results   Component Value Date    GLC 84 04/24/2018     Lab Results   Component Value Date    A1C 6.9 04/16/2018  "    Lab Results   Component Value Date    CHOL 104 09/13/2017     Lab Results   Component Value Date    LDL 31 09/13/2017     Lab Results   Component Value Date    HDL 40 09/13/2017     Lab Results   Component Value Date    TRIG 166 09/13/2017     Lab Results   Component Value Date    CR 0.92 04/18/2018     Lab Results   Component Value Date    ALT 12 04/16/2018     Lab Results   Component Value Date    AST 27 04/16/2018     Lab Results   Component Value Date    MICROL 19 09/13/2017     Lab Results   Component Value Date    TSH 0.41 02/12/2018       OBJECTIVE:  /86  Pulse 78  Temp 98.2  F (36.8  C) (Oral)  Resp 16  Wt 210 lb (95.3 kg)  SpO2 96%  BMI 29.29 kg/m2   Estimated body mass index is 29.29 kg/(m^2) as calculated from the following:    Height as of 4/23/18: 5' 11\" (1.803 m).    Weight as of this encounter: 210 lb (95.3 kg).  Eye: PERRL, EOMI  HENT: ear canals and TM's normal and nose and mouth without ulcers or lesions.  Nasal mucosa minimally edematous.  No sinus tenderness  Neck: no adenopathy. Thyroid normal to palpation. No bruits  Pulm: Lungs clear to auscultation. No wheezes or rhonchi.  Rare nonproductive cough during the visit today.   CV: Regular rates and rhythm  GI: Soft, nontender, Normal active bowel sounds, No hepatosplenomegaly or masses palpable  Ext: Peripheral pulses intact. No edema.  Neuro: Normal strength and tone, sensory exam grossly normal    Assessment/Plan: (See plan discussion below for further details)  1. Essential hypertension, benign  Needs improved control.  Increase lisinopril to 30 mg daily  - lisinopril (PRINIVIL,ZESTRIL) 30 MG tablet; Take 1 tablet (30 mg) by mouth daily  Dispense: 90 tablet; Refill: 3    2. Atrial fibrillation, unspecified type (H)  Currently in sinus rhythm following cardioversion.  Continue metoprolol and anticoagulation therapy.  Patient will turn in ZIOpatch to cardiology when completed    3. Upper respiratory tract infection, unspecified " type  Improved. Delsym prn NP cough    4. BRANDON (obstructive sleep apnea)   Good energy when in NSR and prior to URI onset per pt since had previous sinus surgery. Pt declines repeat home sleep eval. If has recurrence of A fib, however, will then order given increased risk A fib episodes with untreated BRANDON    5. Testosterone deficiency  Continue current therapy.  Repeat labs in 3 months.  Patient will have labs done right before injection given  - Comprehensive metabolic panel; Future  - CBC with platelets; Future  - Testosterone Free and Total; Future    6. Type 2 diabetes mellitus without complication, with long-term current use of insulin (H)  Controlled.  Recent A1c 6.9.  Repeat labs 3 months  - Hemoglobin A1c; Future  - Comprehensive metabolic panel; Future  - Lipid panel reflex to direct LDL Fasting; Future  - Albumin Random Urine Quantitative with Creat Ratio; Future  - TSH with free T4 reflex; Future    7. Screening for prostate cancer  Due for PSA screening in 3 months given testosterone use  - Prostate spec antigen screen; Future    Plan discussion:  Increase Lisinopril to 20mg tab, 1.5 tabs (total 30mg) daily if able to cut in half. Otherwise stop the 20mg tabs and start Lisinopril 30mg tab, 1 tab daily for blood pressure   Delsym OTC 2 tsp twice a day as needed for cough   If post nasal drainage persists in 1 week and bothersome, the let us know  Be sure taking Loratidine/Claritin 1 tab daily to help with nasal drainage   Mail in Ziopatch  When completed  Continue other medications  Monitor energy as remain out of A fib and with respiratory infection resolving and on testosterone. If energy worsens or reoccurrence A fib, then will  repeat sleep study  Fasting labs in 3 months (early August)       Andrew Elizalde MD  Internal Medicine Department  [facility]

## 2018-05-01 NOTE — PATIENT INSTRUCTIONS
Increase Lisinopril to 20mg tab, 1.5 tabs (total 30mg) daily if able to cut in half. Otherwise stop the 20mg tabs and start Lisinopril 30mg tab, 1 tab daily for blood pressure   Delsym OTC 2 tsp twice a day as needed for cough   If post nasal drainage persists in 1 week and bothersome, the let us know  Be sure taking Loratidine/Claritin 1 tab daily to help with nasal drainage   Mail in Ziopatch  When completed  Continue other medications  Monitor energy as remain out of A fib and with respiratory infection resolving and on testosterone. If energy worsens, then possible repeat sleep study  Fasting labs in 3 months (early August)

## 2018-05-01 NOTE — MR AVS SNAPSHOT
After Visit Summary   5/1/2018    Pascual Perea    MRN: [id number]           Patient Information     Date Of Birth          1940        Visit Information        Provider Department      5/1/2018 2:00 PM Andrew Elizalde MD [facility]        Today's Diagnoses     Essential hypertension, benign    -  1      Care Instructions    Increase Lisinopril to 20mg tab, 1.5 tabs (total 30mg) daily if able to cut in half. Otherwise stop the 20mg tabs and start Lisinopril 30mg tab, 1 tab daily for blood pressure   Delsym OTC 2 tsp twice a day as needed for cough   If post nasal drainage persists in 1 week and bothersome, the let us know  Be sure taking Loratidine/Claritin 1 tab daily to help with nasal drainage   Mail in Ziopatch  When completed  Continue other medications  Monitor energy as remain out of A fib and with respiratory infection resolving and on testosterone. If energy worsens, then possible repeat sleep study  Fasting labs in 3 months (early August)          Follow-ups after your visit        Your next 10 appointments already scheduled     May 25, 2018 10:30 AM CDT   [facility] EP RETURN with Rebekah Marrero PA-C   [facility] ([facility])    [address] [place] [id number] [phone] OPT 2              Who to contact     If you have questions or need follow up information about today's clinic visit or your schedule please contact [facility] directly at [phone].  Normal or non-critical lab and imaging results will be communicated to you by MyChart, letter or phone within 4 business days after the clinic has received the results. If you do not hear from us within 7 days, please contact the clinic through MyChart or phone. If you have a critical or abnormal lab result, we will notify you by phone as soon as possible.  Submit refill requests through [employer]hart or call your  pharmacy and they will forward the refill request to us. Please allow 3 business days for your refill to be completed.          Additional Information About Your Visit        [employer] Information     [employer] gives you secure access to your electronic health record. If you see a primary care provider, you can also send messages to your care team and make appointments. If you have questions, please call your primary care clinic.  If you do not have a primary care provider, please call [phone] and they will assist you.        Care EveryWhere ID     This is your Care EveryWhere ID. This could be used by other organizations to access your [facility] medical records  [id number]        Your Vitals Were     Pulse Temperature Respirations Pulse Oximetry BMI (Body Mass Index)       78 98.2  F (36.8  C) (Oral) 16 96% 29.29 kg/m2        Blood Pressure from Last 3 Encounters:   05/01/18 134/86   04/24/18 142/80   04/23/18 129/74    Weight from Last 3 Encounters:   05/01/18 210 lb (95.3 kg)   04/23/18 210 lb 3.2 oz (95.3 kg)   04/18/18 206 lb (93.4 kg)              Today, you had the following     No orders found for display         Today's Medication Changes          These changes are accurate as of 5/1/18  2:48 PM.  If you have any questions, ask your nurse or doctor.               These medicines have changed or have updated prescriptions.        Dose/Directions    * lisinopril 20 MG tablet   Commonly known as:  PRINIVIL/ZESTRIL   This may have changed:  Another medication with the same name was added. Make sure you understand how and when to take each.   Used for:  Essential hypertension, benign   Changed by:  Andrew Elizalde MD        TAKE ONE TABLET BY MOUTH EVERY DAY   Quantity:  90 tablet   Refills:  3       * lisinopril 30 MG tablet   Commonly known as:  PRINIVIL,ZESTRIL   This may have changed:  You were already taking a medication with the same name, and this prescription was added. Make sure you understand how and  when to take each.   Used for:  Essential hypertension, benign   Changed by:  Andrew Elizalde MD        Dose:  30 mg   Take 1 tablet (30 mg) by mouth daily   Quantity:  90 tablet   Refills:  3       * Notice:  This list has 2 medication(s) that are the same as other medications prescribed for you. Read the directions carefully, and ask your doctor or other care provider to review them with you.         Where to get your medicines      These medications were sent to [facility] [address].  [address] [place] [id number]     Phone:  [phone]     lisinopril 30 MG tablet                Primary Care Provider Office Phone # Fax #    Andrew Elizalde [phone]       [address] [place] [id number]        Equal Access to Services     [facility] HospitalSTANISLAV : Hadii meli santamaria hadasho Soomaali, waaxda luqadaha, qaybta kaalmada adechristalyada, suhail fish . So [facility] [phone].    ATENCIÓN: Si habla español, tiene a cooley disposición servicios gratuitos de asistencia lingüística. LlKettering Health Hamilton [phone].    We comply with applicable federal civil rights laws and [place] laws. We do not discriminate on the basis of race, color, national origin, age, disability, sex, sexual orientation, or gender identity.            Thank you!     Thank you for choosing [facility]  for your care. Our goal is always to provide you with excellent care. Hearing back from our patients is one way we can continue to improve our services. Please take a few minutes to complete the written survey that you may receive in the mail after your visit with us. Thank you!             Your Updated Medication List - Protect others around you: Learn how to safely use, store and throw away your medicines at www.disposemymeds.org.          This list is accurate as of 5/1/18  2:48 PM.  Always use your most recent med list.                   Brand Name Dispense  Instructions for use Diagnosis    apixaban ANTICOAGULANT 5 MG tablet    ELIQUIS    60 tablet    Take 1 tablet (5 mg) by mouth 2 times daily    Typical atrial flutter (H)       blood glucose monitoring test strip    ONETOUCH ULTRA    200 each    Test twice daily with One Touch Ultra Blue    Type 2 diabetes mellitus without complication, with long-term current use of insulin (H)       cyanocobalamin 1000 MCG tablet    vitamin  B-12     Take 5,000 mcg by mouth daily.        ferrous fumarate 65 mg ([place]. FE)-Vitamin C 125 mg  MG Tabs tablet    VITRON-C    60 tablet    1 tab twice a day for anemia    Iron deficiency anemia, unspecified iron deficiency anemia type       fluticasone 50 MCG/ACT spray    FLONASE    48 g    USE ONE TO TWO SPRAYS IN EACH NOSTRIL EVERY DAY    Chronic allergic rhinitis, unspecified seasonality, unspecified trigger       gemfibrozil 600 MG tablet    LOPID    180 tablet    TAKE ONE TABLET BY MOUTH TWICE A DAY    Hyperlipidemia LDL goal <70       glucose 4 g Chew chewable tablet     30    as directed    Type II or unspecified type diabetes mellitus without mention of complication, not stated as uncontrolled       insulin aspart 100 UNIT/ML injection    NovoLOG FLEXPEN    30 mL    Inject 3 times daily (before meals). 2 units per carb choice (approx 5-10 units/meal)    Type 2 diabetes mellitus without complication, with long-term current use of insulin (H)       insulin detemir 100 UNIT/ML injection    LEVEMIR FLEXPEN/FLEXTOUCH    45 mL    Inject 40-45 Units Subcutaneous At Bedtime    Type 2 diabetes mellitus without complication, with long-term current use of insulin (H)       insulin pen needle 30G X 8 MM    NOVOFINE 30    400 each    1 Syringe 4 times daily    Type 2 diabetes mellitus without complication, with long-term current use of insulin (H)       levothyroxine 125 MCG tablet    SYNTHROID/LEVOTHROID    90 tablet    TAKE ONE TABLET BY MOUTH EVERY DAY    Hypothyroidism, unspecified type  "      * lisinopril 20 MG tablet    PRINIVIL/ZESTRIL    90 tablet    TAKE ONE TABLET BY MOUTH EVERY DAY    Essential hypertension, benign       * lisinopril 30 MG tablet    PRINIVIL,ZESTRIL    90 tablet    Take 1 tablet (30 mg) by mouth daily    Essential hypertension, benign       loratadine 10 MG tablet    CLARITIN    90 tablet    TAKE ONE TABLET BY MOUTH EVERY DAY    Chronic allergic rhinitis, unspecified seasonality, unspecified trigger       metFORMIN 1000 MG tablet    GLUCOPHAGE    60 tablet    TAKE ONE TABLET BY MOUTH TWO TIMES A DAY WITH MEALS    Type 2 diabetes mellitus without complication (H)       metoprolol tartrate 50 MG tablet    LOPRESSOR    180 tablet    TAKE ONE TABLET BY MOUTH TWICE A DAY    Essential hypertension, benign       nitroGLYcerin 0.4 MG sublingual tablet    NITROSTAT    25 tablet    Place 1 tablet (0.4 mg) under the tongue every 5 minutes as needed for chest pain    Unstable angina (H)       ONETOUCH DELICA LANCETS 33G Misc     300 each    1 Device 3 times daily . Use to test blood sugars 3 times daily or as directed.    Type 2 diabetes, HbA1C goal < 8% (H)       order for DME     30 Device    Equipment being ordered: 3ml syringes and 22G 1 1/2\" needles to use with testosterone inj every 2 weeks    Testosterone deficiency       pravastatin 80 MG tablet    PRAVACHOL    90 tablet    TAKE ONE TABLET BY MOUTH EVERY NIGHT AT BEDTIME    Hyperlipidemia LDL goal <70       ranitidine 150 MG tablet    ZANTAC    180 tablet    TAKE ONE TABLET BY MOUTH TWICE A DAY    Gastroesophageal reflux disease without esophagitis       testosterone cypionate 200 MG/ML injection    DEPOTESTOTERONE    10 vial    Inject 1.5 mLs (300 mg) into the muscle every 14 days    Testosterone deficiency       VITAMIN D3 PO      Take 5,000 Units by mouth daily        * Notice:  This list has 2 medication(s) that are the same as other medications prescribed for you. Read the directions carefully, and ask your doctor or other " care provider to review them with you.

## 2018-05-02 PROBLEM — I24.9 ACS (ACUTE CORONARY SYNDROME) (H): Status: RESOLVED | Noted: 2018-04-16 | Resolved: 2018-05-02

## 2018-05-02 PROBLEM — I48.91 ATRIAL FIBRILLATION, UNSPECIFIED TYPE (H): Status: ACTIVE | Noted: 2018-05-02

## 2018-05-02 PROBLEM — R06.02 SHORTNESS OF BREATH: Status: RESOLVED | Noted: 2018-04-17 | Resolved: 2018-05-02

## 2018-05-02 PROBLEM — R79.89 ELEVATED TROPONIN: Status: RESOLVED | Noted: 2018-04-16 | Resolved: 2018-05-02

## 2018-05-25 ENCOUNTER — OFFICE VISIT (OUTPATIENT)
Dept: CARDIOLOGY | Facility: CLINIC | Age: 78
End: 2018-05-25
Attending: INTERNAL MEDICINE
Payer: COMMERCIAL

## 2018-05-25 VITALS
BODY MASS INDEX: 29.12 KG/M2 | SYSTOLIC BLOOD PRESSURE: 128 MMHG | HEART RATE: 91 BPM | HEIGHT: 71 IN | WEIGHT: 208 LBS | DIASTOLIC BLOOD PRESSURE: 71 MMHG

## 2018-05-25 DIAGNOSIS — I48.19 PERSISTENT ATRIAL FIBRILLATION (H): ICD-10-CM

## 2018-05-25 PROCEDURE — 99214 OFFICE O/P EST MOD 30 MIN: CPT | Performed by: PHYSICIAN ASSISTANT

## 2018-05-25 NOTE — LETTER
5/25/2018      Andrew Elizalde MD  [address] [facility] [address]      RE: Pascual Perea       Dear Colleague,    I had the pleasure of seeing Pascual Perea in the [facility].    Service Date: 05/25/2018      HISTORY OF PRESENT ILLNESS:  I had the pleasure of seeing Pascual today when he came for followup of his atrial fibrillation.  He is accompanied today by his wife, Shanon, and today we reviewed his history of coronary disease status post bypass surgery roughly 20 years ago, diabetes, hypertension, and dyslipidemia.  He sees Dr. Olivas for routine cardiology and he had him see Dr. Jarvis in the past secondary to atrial fibrillation.  This was first diagnosed in 05/2016.  It looked like coarse atrial fibrillation versus an atypical atrial flutter.  He underwent DC cardioversion and sinus rhythm was restored back in 2016.  He did well; however, in 04/2018 was hospitalized with significant coughing.  His troponin was minimally elevated and Dr. Mccain was consulted.  It was noted that he was back in atrial fibrillation and Dr. Jarvis was unsure if this was related to his viral illness and recommended re-attempt at DC cardioversion.  He had been maintained routinely on Eliquis.  Dr. Jarvis was hoping to see a notable change in his stamina level as if he is stamina improved post-cardioversion we would expect that the atrial fibrillation (though with a controlled response) was related and continue to attempt maintenance of sinus rhythm.      He underwent DC cardioversion on 04/24/2018.  Dr. Anders gave one 120 joule shock restoring sinus rhythm in the 50s and 60s.      He was asked to wear a ZIO Patch following which was worn from 04/24 to 05/08.  This showed no evidence of recurrent atrial fibrillation.  Average heart rate was 70 with a minimum of 51 and maximum of 135.  He also had some brief runs of SVT up to 174 beats per minute as well as 2:1 Wenckebach while he was sleeping at 5:30 in  "the morning.      Pascual tells me that since he has been back in sinus rhythm he does feel like he has more stamina.  He mentions working out in the yard and Shanon tells me that he can do her \"honeydew list.\"  He does continue to note some fatigue and admits that he stopped using his CPAP a number of years ago secondary to being \"dry.\"  He has subsequently had deviated septum surgery.  He follows closely with Dr. Elizalde.      Overall, Pascual tells me that he does feel better in sinus rhythm and with the exception of a cough and runny nose and underlying fatigue he is doing well.      Denies chest pain, pressure or tightness.  Denies any problems with bleeding on the Eliquis.        ASSESSMENT AND PLAN:     1.  Atrial fibrillation.  He was first diagnosed with this back in 05/2016 and now again in 04/2018 in the setting of a viral illness.  At this time, it does appear that he has noticed a significant change in his stamina levels in normal rhythm.  Though he does have underlying fatigue, he states that it was \"way worse\" when he was in AFib.      At this time, we will have him continue to monitor his heart rhythm routinely.  If he were to go back into atrial fibrillation we would likely start an antiarrhythmic such as amiodarone with plans for repeat cardioversion.      He will remain on his Eliquis given his high CHADS-VASc score.      2.  Coronary disease.  He had bypass surgery many years ago.  He has metabolic syndrome and continues to follow with Dr. Elizalde and Dr. Olivas.  He will see Dr. Olivas now in about 6 months.  Of note, his last stress test in 04/2018 done in the hospital showed a very small area of ischemia in the inferior mid ventricle.  Ejection fraction remained normal and he was asymptomatic with chest discomfort (which he had previously had with his previous bypass and angiogram in 04/2012) and medical management was recommended.      It has been a pleasure to see Pascual in clinic.  He will call us " if he has any recurrent atrial fibrillation.         YUE JESUS PA-C             D: 2018   T: 2018   MT: NGUYEN      Name:     JESÚS VASQUEZ   MRN:      [id number]        Account:      [id number]   :      1940           Service Date: 2018      Document: [id number]           Outpatient Encounter Prescriptions as of 2018   Medication Sig Dispense Refill     apixaban ANTICOAGULANT (ELIQUIS) 5 MG tablet Take 1 tablet (5 mg) by mouth 2 times daily 60 tablet 11     blood glucose monitoring (ONE TOUCH ULTRA) test strip Test twice daily with One Touch Ultra Blue 200 each 3     Cholecalciferol (VITAMIN D3 PO) Take 5,000 Units by mouth daily       cyanocolbalamin (VITAMIN  B-12) 1000 MCG tablet Take 5,000 mcg by mouth daily.       ferrous fumarate 65 mg, [employer]. FE,-Vitamin C 125 mg (VITRON-C)  MG TABS tablet 1 tab twice a day for anemia 60 tablet 11     fluticasone (FLONASE) 50 MCG/ACT spray USE ONE TO TWO SPRAYS IN EACH NOSTRIL EVERY DAY 48 g 3     gemfibrozil (LOPID) 600 MG tablet TAKE ONE TABLET BY MOUTH TWICE A  tablet 1     GLUCOSE 4 GM OR CHEW as directed 30 11     insulin aspart (NOVOLOG FLEXPEN) 100 UNIT/ML injection Inject 3 times daily (before meals). 2 units per carb choice (approx 5-10 units/meal) 30 mL 3     insulin detemir (LEVEMIR FLEXPEN/FLEXTOUCH) 100 UNIT/ML injection Inject 40-45 Units Subcutaneous At Bedtime 45 mL 3     insulin pen needle (NOVOFINE 30) 30G X 8 MM 1 Syringe 4 times daily 400 each 3     levothyroxine (SYNTHROID/LEVOTHROID) 125 MCG tablet TAKE ONE TABLET BY MOUTH EVERY DAY 90 tablet 3     lisinopril (PRINIVIL,ZESTRIL) 30 MG tablet Take 1 tablet (30 mg) by mouth daily 90 tablet 3     lisinopril (PRINIVIL/ZESTRIL) 20 MG tablet TAKE ONE TABLET BY MOUTH EVERY DAY 90 tablet 3     loratadine (CLARITIN) 10 MG tablet TAKE ONE TABLET BY MOUTH EVERY DAY 90 tablet 3     metFORMIN (GLUCOPHAGE) 1000 MG tablet TAKE ONE TABLET BY MOUTH TWO TIMES A DAY  "WITH MEALS 60 tablet 4     metoprolol tartrate (LOPRESSOR) 50 MG tablet TAKE ONE TABLET BY MOUTH TWICE A  tablet 3     nitroglycerin (NITROSTAT) 0.4 MG SL tablet Place 1 tablet (0.4 mg) under the tongue every 5 minutes as needed for chest pain 25 tablet 1     ONETOUCH DELICA LANCETS 33G MISC 1 Device 3 times daily . Use to test blood sugars 3 times daily or as directed. 300 each prn     order for DME Equipment being ordered: 3ml syringes and 22G 1 1/2\" needles to use with testosterone inj every 2 weeks 30 Device 1     pravastatin (PRAVACHOL) 80 MG tablet TAKE ONE TABLET BY MOUTH EVERY NIGHT AT BEDTIME 90 tablet 1     ranitidine (ZANTAC) 150 MG tablet TAKE ONE TABLET BY MOUTH TWICE A  tablet 3     testosterone cypionate (DEPOTESTOTERONE) 200 MG/ML injection Inject 1.5 mLs (300 mg) into the muscle every 14 days 10 vial 1     No facility-administered encounter medications on file as of 5/25/2018.        Again, thank you for allowing me to participate in the care of your patient.      Sincerely,    Rebekah Marrero PA-C     [facility]    "

## 2018-05-25 NOTE — PATIENT INSTRUCTIONS
1. Reviewed your ZioPatch worn 4/24-5/8 showing normal rhythm. NO atrial fibrillation seen after your cardioversion.  This is good news!  You did have some fast beats form the top of the heart called SVT, but they were short lived, lasting <12 beats.  You also had some episodes of very slow heart beats, usually while sleeping. This is typical for untreated sleep apnea.    2. As we discussed, you do feel better in normal rhyhtm (more stamina).  If you were to go back into atrial fibrillation, we will try to get you out of of it with medications and maybe another cardioversion. If you think you're back in AFib, CALL [phone] (our AFib nurses Tatiana Chin and Sam) within a few days.    3. Continue to monitor fatigue.  Could always set you back up with sleep doctor.

## 2018-05-25 NOTE — PROGRESS NOTES
HPI and Plan:   See dictation #[id number]    Orders Placed This Encounter   Procedures     Follow-Up with Cardiologist     EKG 12-lead complete w/read - Clinics       No orders of the defined types were placed in this encounter.      There are no discontinued medications.      Encounter Diagnosis   Name Primary?     Persistent atrial fibrillation (H)        CURRENT MEDICATIONS:  Current Outpatient Prescriptions   Medication Sig Dispense Refill     apixaban ANTICOAGULANT (ELIQUIS) 5 MG tablet Take 1 tablet (5 mg) by mouth 2 times daily 60 tablet 11     blood glucose monitoring (ONE TOUCH ULTRA) test strip Test twice daily with One Touch Ultra Blue 200 each 3     Cholecalciferol (VITAMIN D3 PO) Take 5,000 Units by mouth daily       cyanocolbalamin (VITAMIN  B-12) 1000 MCG tablet Take 5,000 mcg by mouth daily.       ferrous fumarate 65 mg, [place]. FE,-Vitamin C 125 mg (VITRON-C)  MG TABS tablet 1 tab twice a day for anemia 60 tablet 11     fluticasone (FLONASE) 50 MCG/ACT spray USE ONE TO TWO SPRAYS IN EACH NOSTRIL EVERY DAY 48 g 3     gemfibrozil (LOPID) 600 MG tablet TAKE ONE TABLET BY MOUTH TWICE A  tablet 1     GLUCOSE 4 GM OR CHEW as directed 30 11     insulin aspart (NOVOLOG FLEXPEN) 100 UNIT/ML injection Inject 3 times daily (before meals). 2 units per carb choice (approx 5-10 units/meal) 30 mL 3     insulin detemir (LEVEMIR FLEXPEN/FLEXTOUCH) 100 UNIT/ML injection Inject 40-45 Units Subcutaneous At Bedtime 45 mL 3     insulin pen needle (NOVOFINE 30) 30G X 8 MM 1 Syringe 4 times daily 400 each 3     levothyroxine (SYNTHROID/LEVOTHROID) 125 MCG tablet TAKE ONE TABLET BY MOUTH EVERY DAY 90 tablet 3     lisinopril (PRINIVIL,ZESTRIL) 30 MG tablet Take 1 tablet (30 mg) by mouth daily 90 tablet 3     lisinopril (PRINIVIL/ZESTRIL) 20 MG tablet TAKE ONE TABLET BY MOUTH EVERY DAY 90 tablet 3     loratadine (CLARITIN) 10 MG tablet TAKE ONE TABLET BY MOUTH EVERY DAY 90 tablet 3     metFORMIN (GLUCOPHAGE) 1000 MG  "tablet TAKE ONE TABLET BY MOUTH TWO TIMES A DAY WITH MEALS 60 tablet 4     metoprolol tartrate (LOPRESSOR) 50 MG tablet TAKE ONE TABLET BY MOUTH TWICE A  tablet 3     nitroglycerin (NITROSTAT) 0.4 MG SL tablet Place 1 tablet (0.4 mg) under the tongue every 5 minutes as needed for chest pain 25 tablet 1     ONETOUCH DELICA LANCETS 33G MISC 1 Device 3 times daily . Use to test blood sugars 3 times daily or as directed. 300 each prn     order for DME Equipment being ordered: 3ml syringes and 22G 1 1/2\" needles to use with testosterone inj every 2 weeks 30 Device 1     pravastatin (PRAVACHOL) 80 MG tablet TAKE ONE TABLET BY MOUTH EVERY NIGHT AT BEDTIME 90 tablet 1     ranitidine (ZANTAC) 150 MG tablet TAKE ONE TABLET BY MOUTH TWICE A  tablet 3     testosterone cypionate (DEPOTESTOTERONE) 200 MG/ML injection Inject 1.5 mLs (300 mg) into the muscle every 14 days 10 vial 1       ALLERGIES     Allergies   Allergen Reactions     Omeprazole      diarrhea       PAST MEDICAL HISTORY:  Past Medical History:   Diagnosis Date     Adhesive capsulitis of shoulder      Anemia 3/10/2014     Anemia, unspecified      Antiplatelet or antithrombotic long-term use      Arrhythmia     Atrial fib/flutter     CAD (coronary artery disease)      History of cardioversion 04/24/2018    a single synchronized shock of 120 joules restored normal sinus rhythm     Hyperlipidemia LDL goal <70 5/26/2011     Hypertension      Hypothyroidism      Impotence of organic origin      Laceration of finger  June 2010     left thumb s/p sutures     Numbness and tingling     diabetic neuropathy bilateral feet     BRANDON (obstructive sleep apnea)     intolerant of CPAP, uses it occasionally.  Using mandibular device occasionally     Osteopenia      Pulmonary hypertension 4/6/2012     Sensorineural hearing loss, unspecified      Stented coronary artery 1997    CABG      Testosterone deficiency      Type 2 diabetes mellitus without complication  (goal " A1C<8) 10/24/2015     Typical atrial flutter (H) 4/18/16     Unspecified hypothyroidism      Vitamin D deficiency 9/3/2011       PAST SURGICAL HISTORY:  Past Surgical History:   Procedure Laterality Date     C NONSPECIFIC PROCEDURE  1995    CABG ([facility])     C NONSPECIFIC PROCEDURE  8/01    stress echo     CARDIAC SURGERY      bypass in 1997     CARDIOVERSION  04/24/2018     COLONOSCOPY       CORONARY ANGIOGRAPHY ADULT ORDER      4/2/2012     CORONARY ARTERY BYPASS  1997    [facility] [place] [facility]     EXCISE MASS HEAD Left 8/18/2016    Procedure: EXCISE MASS HEAD;  Surgeon: Ivan Hernandez MD;  Location: [facility]     HEART CATH, ANGIOPLASTY  4/2012     PHACOEMULSIFICATION CLEAR CORNEA WITH STANDARD INTRAOCULAR LENS IMPLANT Left 6/8/2015    Procedure: PHACOEMULSIFICATION CLEAR CORNEA WITH STANDARD INTRAOCULAR LENS IMPLANT;  Surgeon: Nixon Farnsworth MD;  Location: [facility]     PHACOEMULSIFICATION CLEAR CORNEA WITH STANDARD INTRAOCULAR LENS IMPLANT Right 6/22/2015    Procedure: PHACOEMULSIFICATION CLEAR CORNEA WITH STANDARD INTRAOCULAR LENS IMPLANT;  Surgeon: Nixon Farnsworth MD;  Location:  [facility]     SEPTOPLASTY, TURBINOPLASTY, COMBINED Bilateral 8/18/2016    Procedure: COMBINED SEPTOPLASTY, TURBINOPLASTY;  Surgeon: Ivan Hernandez MD;  Location: [facility]       FAMILY HISTORY:  Family History   Problem Relation Age of Onset     Genitourinary Problems Father      d: age 89; ARF     Hypertension Father      DIABETES Mother      d: age 68, CAD     HEART DISEASE Mother      MI     Myocardial Infarction Mother      Cardiovascular Brother      d:  age 31; CAD     HEART DISEASE Brother      age 31, MI     DIABETES Sister      b:  1939; DM2       SOCIAL HISTORY:  Social History     Social History     Marital status:      Spouse name: N/A     Number of children: N/A     Years of education: N/A     Social History Main Topics     Smoking status: Former Smoker     Packs/day: 1.00     Years: 6.00      "Quit date: 7/1/1964     Smokeless tobacco: Never Used      Comment: quit 1964     Alcohol use No     Drug use: No     Sexual activity: Yes     Partners: Female     Other Topics Concern     Caffeine Concern Yes     2 cups coffee a day     Sleep Concern Yes     sleep apnea, wears cpap off and on     Stress Concern No     Weight Concern No     Special Diet Yes     diabetic diet      Exercise No     occ walk the mall     Seat Belt Yes     Social History Narrative       Review of Systems:  Skin:  Positive for rash face   Eyes:  Positive for glasses reading  ENT:  Negative      Respiratory:  Negative for shortness of breath;dyspnea on exertion BRANDON without CPAP therapy   Cardiovascular:  Negative for;palpitations;edema;lightheadedness;dizziness fatigue;Positive for Fatigue remains improved post cardioversion 5/2018 ; improved  Gastroenterology: Positive for heartburn No melena/hematochezia  Genitourinary:  not assessed      Musculoskeletal:  Positive for foot pain occ  Neurologic:  Negative      Psychiatric:  Negative      Heme/Lymph/Imm:  Negative      Endocrine:  Positive for diabetes;thyroid disorder;night sweats      Physical Exam:  Vitals: /71  Pulse 91  Ht 1.803 m (5' 11\")  Wt 94.3 kg (208 lb)  BMI 29.01 kg/m2    Constitutional:  cooperative, alert and oriented, well developed, well nourished, in no acute distress        Skin:  warm and dry to the touch, no apparent skin lesions or masses noted          Head:  normocephalic, no masses or lesions        Eyes:  pupils equal and round;conjunctivae and lids unremarkable;sclera white;no xanthalasma        Lymph:      ENT:  no pallor or cyanosis, dentition good        Neck:  JVP normal;no carotid bruit        Respiratory:  normal breath sounds, clear to auscultation, normal A-P diameter, normal symmetry, normal respiratory excursion, no use of accessory muscles         Cardiac: regular rhythm, normal S1/S2, no S3 or S4, apical impulse not displaced, no murmurs, " gallops or rubs                pulses full and equal                                        GI:  abdomen soft obese      Extremities and Muscular Skeletal:  no edema;no deformities, clubbing, cyanosis, erythema observed              Neurological:  no gross motor deficits        Psych:  Alert and Oriented x 3

## 2018-05-25 NOTE — MR AVS SNAPSHOT
After Visit Summary   5/25/2018    Pascual Perea    MRN: [id number]           Patient Information     Date Of Birth          1940        Visit Information        Provider Department      5/25/2018 10:30 AM Rebekah Marrero PA-C [facility]        Today's Diagnoses     Persistent atrial fibrillation (H)          Care Instructions    1. Reviewed your ZioPatch worn 4/24-5/8 showing normal rhythm. NO atrial fibrillation seen after your cardioversion.  This is good news!  You did have some fast beats form the top of the heart called SVT, but they were short lived, lasting <12 beats.  You also had some episodes of very slow heart beats, usually while sleeping. This is typical for untreated sleep apnea.    2. As we discussed, you do feel better in normal rhyhtm (more stamina).  If you were to go back into atrial fibrillation, we will try to get you out of of it with medications and maybe another cardioversion. If you think you're back in AFib, CALL [phone] (our AFib nurses Tatiana Chin and Sam) within a few days.    3. Continue to monitor fatigue.  Could always set you back up with sleep doctor.          Follow-ups after your visit        Who to contact     If you have questions or need follow up information about today's clinic visit or your schedule please contact [facility] directly at [phone].  Normal or non-critical lab and imaging results will be communicated to you by MyChart, letter or phone within 4 business days after the clinic has received the results. If you do not hear from us within 7 days, please contact the clinic through MyChart or phone. If you have a critical or abnormal lab result, we will notify you by phone as soon as possible.  Submit refill requests through [employer] or call your pharmacy and they will forward the refill request to us. Please allow 3 business days for your refill to be  "completed.          Additional Information About Your Visit        MyChart Information     [employer] gives you secure access to your electronic health record. If you see a primary care provider, you can also send messages to your care team and make appointments. If you have questions, please call your primary care clinic.  If you do not have a primary care provider, please call [phone] and they will assist you.        Care EveryWhere ID     This is your Care EveryWhere ID. This could be used by other organizations to access your [facility] medical records  [id number]        Your Vitals Were     Pulse Height BMI (Body Mass Index)             91 1.803 m (5' 11\") 29.01 kg/m2          Blood Pressure from Last 3 Encounters:   05/25/18 128/71   05/01/18 134/86   04/24/18 142/80    Weight from Last 3 Encounters:   05/25/18 94.3 kg (208 lb)   05/01/18 95.3 kg (210 lb)   04/23/18 95.3 kg (210 lb 3.2 oz)              We Performed the Following     Follow-Up with Cardiac Advanced Practice Provider        Primary Care Provider Office Phone # Fax #    Andrew Elizalde [phone]       [address] [facility] [address]        Equal Access to Services     AMY FLORES : Hadii meli ku hadasho Soomaali, waaxda luqadaha, qaybta kaalmada adeegyada, suhail workmanin haymandin joe cardenas. So [facility] [phone].    ATENCIÓN: Si habla español, tiene a cooley disposición servicios gratuitos de asistencia lingüística. Llame al [phone].    We comply with applicable federal civil rights laws and [place] laws. We do not discriminate on the basis of race, color, national origin, age, disability, sex, sexual orientation, or gender identity.            Thank you!     Thank you for choosing [facility]  for your care. Our goal is always to provide you with excellent care. Hearing back from our patients is one way we can continue to improve our services. Please take a few minutes to " complete the written survey that you may receive in the mail after your visit with us. Thank you!             Your Updated Medication List - Protect others around you: Learn how to safely use, store and throw away your medicines at www.disposemymeds.org.          This list is accurate as of 5/25/18 11:14 AM.  Always use your most recent med list.                   Brand Name Dispense Instructions for use Diagnosis    apixaban ANTICOAGULANT 5 MG tablet    ELIQUIS    60 tablet    Take 1 tablet (5 mg) by mouth 2 times daily    Typical atrial flutter (H)       blood glucose monitoring test strip    ONETOUCH ULTRA    200 each    Test twice daily with One Touch Ultra Blue    Type 2 diabetes mellitus without complication, with long-term current use of insulin (H)       cyanocobalamin 1000 MCG tablet    vitamin  B-12     Take 5,000 mcg by mouth daily.        ferrous fumarate 65 mg ([place]. FE)-Vitamin C 125 mg  MG Tabs tablet    VITRON-C    60 tablet    1 tab twice a day for anemia    Iron deficiency anemia, unspecified iron deficiency anemia type       fluticasone 50 MCG/ACT spray    FLONASE    48 g    USE ONE TO TWO SPRAYS IN EACH NOSTRIL EVERY DAY    Chronic allergic rhinitis, unspecified seasonality, unspecified trigger       gemfibrozil 600 MG tablet    LOPID    180 tablet    TAKE ONE TABLET BY MOUTH TWICE A DAY    Hyperlipidemia LDL goal <70       glucose 4 g Chew chewable tablet     30    as directed    Type II or unspecified type diabetes mellitus without mention of complication, not stated as uncontrolled       insulin aspart 100 UNIT/ML injection    NovoLOG FLEXPEN    30 mL    Inject 3 times daily (before meals). 2 units per carb choice (approx 5-10 units/meal)    Type 2 diabetes mellitus without complication, with long-term current use of insulin (H)       insulin detemir 100 UNIT/ML injection    LEVEMIR FLEXPEN/FLEXTOUCH    45 mL    Inject 40-45 Units Subcutaneous At Bedtime    Type 2 diabetes mellitus  "without complication, with long-term current use of insulin (H)       insulin pen needle 30G X 8 MM    NOVOFINE 30    400 each    1 Syringe 4 times daily    Type 2 diabetes mellitus without complication, with long-term current use of insulin (H)       levothyroxine 125 MCG tablet    SYNTHROID/LEVOTHROID    90 tablet    TAKE ONE TABLET BY MOUTH EVERY DAY    Hypothyroidism, unspecified type       * lisinopril 20 MG tablet    PRINIVIL/ZESTRIL    90 tablet    TAKE ONE TABLET BY MOUTH EVERY DAY    Essential hypertension, benign       * lisinopril 30 MG tablet    PRINIVIL,ZESTRIL    90 tablet    Take 1 tablet (30 mg) by mouth daily    Essential hypertension, benign       loratadine 10 MG tablet    CLARITIN    90 tablet    TAKE ONE TABLET BY MOUTH EVERY DAY    Chronic allergic rhinitis, unspecified seasonality, unspecified trigger       metFORMIN 1000 MG tablet    GLUCOPHAGE    60 tablet    TAKE ONE TABLET BY MOUTH TWO TIMES A DAY WITH MEALS    Type 2 diabetes mellitus without complication (H)       metoprolol tartrate 50 MG tablet    LOPRESSOR    180 tablet    TAKE ONE TABLET BY MOUTH TWICE A DAY    Essential hypertension, benign       nitroGLYcerin 0.4 MG sublingual tablet    NITROSTAT    25 tablet    Place 1 tablet (0.4 mg) under the tongue every 5 minutes as needed for chest pain    Unstable angina (H)       ONETOUCH DELICA LANCETS 33G Misc     300 each    1 Device 3 times daily . Use to test blood sugars 3 times daily or as directed.    Type 2 diabetes, HbA1C goal < 8% (H)       order for DME     30 Device    Equipment being ordered: 3ml syringes and 22G 1 1/2\" needles to use with testosterone inj every 2 weeks    Testosterone deficiency       pravastatin 80 MG tablet    PRAVACHOL    90 tablet    TAKE ONE TABLET BY MOUTH EVERY NIGHT AT BEDTIME    Hyperlipidemia LDL goal <70       ranitidine 150 MG tablet    ZANTAC    180 tablet    TAKE ONE TABLET BY MOUTH TWICE A DAY    Gastroesophageal reflux disease without " esophagitis       testosterone cypionate 200 MG/ML injection    DEPOTESTOTERONE    10 vial    Inject 1.5 mLs (300 mg) into the muscle every 14 days    Testosterone deficiency       VITAMIN D3 PO      Take 5,000 Units by mouth daily        * Notice:  This list has 2 medication(s) that are the same as other medications prescribed for you. Read the directions carefully, and ask your doctor or other care provider to review them with you.

## 2018-05-25 NOTE — PROGRESS NOTES
"Service Date: 05/25/2018      HISTORY OF PRESENT ILLNESS:  I had the pleasure of seeing Pascual today when he came for followup of his atrial fibrillation.  He is accompanied today by his wife, Shanon, and today we reviewed his history of coronary disease status post bypass surgery roughly 20 years ago, diabetes, hypertension, and dyslipidemia.  He sees Dr. Olivas for routine cardiology and he had him see Dr. Jarvis in the past secondary to atrial fibrillation.  This was first diagnosed in 05/2016.  It looked like coarse atrial fibrillation versus an atypical atrial flutter.  He underwent DC cardioversion and sinus rhythm was restored back in 2016.  He did well; however, in 04/2018 was hospitalized with significant coughing.  His troponin was minimally elevated and Dr. Mccain was consulted.  It was noted that he was back in atrial fibrillation and Dr. Jarvis was unsure if this was related to his viral illness and recommended re-attempt at DC cardioversion.  He had been maintained routinely on Eliquis.  Dr. Jarvis was hoping to see a notable change in his stamina level as if he is stamina improved post-cardioversion we would expect that the atrial fibrillation (though with a controlled response) was related and continue to attempt maintenance of sinus rhythm.      He underwent DC cardioversion on 04/24/2018.  Dr. Anders gave one 120 joule shock restoring sinus rhythm in the 50s and 60s.      He was asked to wear a ZIO Patch following which was worn from 04/24 to 05/08.  This showed no evidence of recurrent atrial fibrillation.  Average heart rate was 70 with a minimum of 51 and maximum of 135.  He also had some brief runs of SVT up to 174 beats per minute as well as 2:1 [place]bach while he was sleeping at 5:30 in the morning.      Pascual tells me that since he has been back in sinus rhythm he does feel like he has more stamina.  He mentions working out in the yard and Shanon tells me that he can do her \"honeydew list.\"  He does " "continue to note some fatigue and admits that he stopped using his CPAP a number of years ago secondary to being \"dry.\"  He has subsequently had deviated septum surgery.  He follows closely with Dr. Elizalde.      Overall, Jesús tells me that he does feel better in sinus rhythm and with the exception of a cough and runny nose and underlying fatigue he is doing well.      Denies chest pain, pressure or tightness.  Denies any problems with bleeding on the Eliquis.        ASSESSMENT AND PLAN:     1.  Atrial fibrillation.  He was first diagnosed with this back in 05/2016 and now again in 04/2018 in the setting of a viral illness.  At this time, it does appear that he has noticed a significant change in his stamina levels in normal rhythm.  Though he does have underlying fatigue, he states that it was \"way worse\" when he was in AFib.      At this time, we will have him continue to monitor his heart rhythm routinely.  If he were to go back into atrial fibrillation we would likely start an antiarrhythmic such as amiodarone with plans for repeat cardioversion.      He will remain on his Eliquis given his high CHADS-VASc score.      2.  Coronary disease.  He had bypass surgery many years ago.  He has metabolic syndrome and continues to follow with Dr. Elizalde and Dr. Olivas.  He will see Dr. Olivas now in about 6 months.  Of note, his last stress test in 04/2018 done in the hospital showed a very small area of ischemia in the inferior mid ventricle.  Ejection fraction remained normal and he was asymptomatic with chest discomfort (which he had previously had with his previous bypass and angiogram in 04/2012) and medical management was recommended.      It has been a pleasure to see Jesús in clinic.  He will call us if he has any recurrent atrial fibrillation.         YUE JESUS PA-C             D: 05/25/2018   T: 05/25/2018   MT: NGUYEN      Name:     JESÚS VASQUEZ   MRN:      [id number]        Account:      [id number] "   :      1940           Service Date: 2018      Document: [id number]

## 2018-05-25 NOTE — LETTER
5/25/2018    Andrew Elizalde MD  [address] [facility] [address]    RE: Pascual Perea       Dear Colleague,    I had the pleasure of seeing Pascual Perea in the [facility].    HPI and Plan:   See dictation #[id number]    Orders Placed This Encounter   Procedures     Follow-Up with Cardiologist     EKG 12-lead complete w/read - Clinics       No orders of the defined types were placed in this encounter.      There are no discontinued medications.      Encounter Diagnosis   Name Primary?     Persistent atrial fibrillation (H)        CURRENT MEDICATIONS:  Current Outpatient Prescriptions   Medication Sig Dispense Refill     apixaban ANTICOAGULANT (ELIQUIS) 5 MG tablet Take 1 tablet (5 mg) by mouth 2 times daily 60 tablet 11     blood glucose monitoring (ONE TOUCH ULTRA) test strip Test twice daily with One Touch Ultra Blue 200 each 3     Cholecalciferol (VITAMIN D3 PO) Take 5,000 Units by mouth daily       cyanocolbalamin (VITAMIN  B-12) 1000 MCG tablet Take 5,000 mcg by mouth daily.       ferrous fumarate 65 mg, [place]. FE,-Vitamin C 125 mg (VITRON-C)  MG TABS tablet 1 tab twice a day for anemia 60 tablet 11     fluticasone (FLONASE) 50 MCG/ACT spray USE ONE TO TWO SPRAYS IN EACH NOSTRIL EVERY DAY 48 g 3     gemfibrozil (LOPID) 600 MG tablet TAKE ONE TABLET BY MOUTH TWICE A  tablet 1     GLUCOSE 4 GM OR CHEW as directed 30 11     insulin aspart (NOVOLOG FLEXPEN) 100 UNIT/ML injection Inject 3 times daily (before meals). 2 units per carb choice (approx 5-10 units/meal) 30 mL 3     insulin detemir (LEVEMIR FLEXPEN/FLEXTOUCH) 100 UNIT/ML injection Inject 40-45 Units Subcutaneous At Bedtime 45 mL 3     insulin pen needle (NOVOFINE 30) 30G X 8 MM 1 Syringe 4 times daily 400 each 3     levothyroxine (SYNTHROID/LEVOTHROID) 125 MCG tablet TAKE ONE TABLET BY MOUTH EVERY DAY 90 tablet 3     lisinopril (PRINIVIL,ZESTRIL) 30 MG tablet Take 1 tablet (30 mg) by mouth daily 90 tablet 3  "    lisinopril (PRINIVIL/ZESTRIL) 20 MG tablet TAKE ONE TABLET BY MOUTH EVERY DAY 90 tablet 3     loratadine (CLARITIN) 10 MG tablet TAKE ONE TABLET BY MOUTH EVERY DAY 90 tablet 3     metFORMIN (GLUCOPHAGE) 1000 MG tablet TAKE ONE TABLET BY MOUTH TWO TIMES A DAY WITH MEALS 60 tablet 4     metoprolol tartrate (LOPRESSOR) 50 MG tablet TAKE ONE TABLET BY MOUTH TWICE A  tablet 3     nitroglycerin (NITROSTAT) 0.4 MG SL tablet Place 1 tablet (0.4 mg) under the tongue every 5 minutes as needed for chest pain 25 tablet 1     ONETOUCH DELICA LANCETS 33G MISC 1 Device 3 times daily . Use to test blood sugars 3 times daily or as directed. 300 each prn     order for DME Equipment being ordered: 3ml syringes and 22G 1 1/2\" needles to use with testosterone inj every 2 weeks 30 Device 1     pravastatin (PRAVACHOL) 80 MG tablet TAKE ONE TABLET BY MOUTH EVERY NIGHT AT BEDTIME 90 tablet 1     ranitidine (ZANTAC) 150 MG tablet TAKE ONE TABLET BY MOUTH TWICE A  tablet 3     testosterone cypionate (DEPOTESTOTERONE) 200 MG/ML injection Inject 1.5 mLs (300 mg) into the muscle every 14 days 10 vial 1       ALLERGIES     Allergies   Allergen Reactions     Omeprazole      diarrhea       PAST MEDICAL HISTORY:  Past Medical History:   Diagnosis Date     Adhesive capsulitis of shoulder      Anemia 3/10/2014     Anemia, unspecified      Antiplatelet or antithrombotic long-term use      Arrhythmia     Atrial fib/flutter     CAD (coronary artery disease)      History of cardioversion 04/24/2018    a single synchronized shock of 120 joules restored normal sinus rhythm     Hyperlipidemia LDL goal <70 5/26/2011     Hypertension      Hypothyroidism      Impotence of organic origin      Laceration of finger  June 2010     left thumb s/p sutures     Numbness and tingling     diabetic neuropathy bilateral feet     BRANDON (obstructive sleep apnea)     intolerant of CPAP, uses it occasionally.  Using mandibular device occasionally     Osteopenia "      Pulmonary hypertension 4/6/2012     Sensorineural hearing loss, unspecified      Stented coronary artery 1997    CABG      Testosterone deficiency      Type 2 diabetes mellitus without complication  (goal A1C<8) 10/24/2015     Typical atrial flutter (H) 4/18/16     Unspecified hypothyroidism      Vitamin D deficiency 9/3/2011       PAST SURGICAL HISTORY:  Past Surgical History:   Procedure Laterality Date     C NONSPECIFIC PROCEDURE  1995    CABG ([place]ism)     C NONSPECIFIC PROCEDURE  8/01    stress echo     CARDIAC SURGERY      bypass in 1997     CARDIOVERSION  04/24/2018     COLONOSCOPY       CORONARY ANGIOGRAPHY ADULT ORDER      4/2/2012     CORONARY ARTERY BYPASS  1997    [facility]     EXCISE MASS HEAD Left 8/18/2016    Procedure: EXCISE MASS HEAD;  Surgeon: Ivan Hernandez MD;  Location: [facility]     HEART CATH, ANGIOPLASTY  4/2012     PHACOEMULSIFICATION CLEAR CORNEA WITH STANDARD INTRAOCULAR LENS IMPLANT Left 6/8/2015    Procedure: PHACOEMULSIFICATION CLEAR CORNEA WITH STANDARD INTRAOCULAR LENS IMPLANT;  Surgeon: Nixon Farnsworth MD;  Location:  [facility]     PHACOEMULSIFICATION CLEAR CORNEA WITH STANDARD INTRAOCULAR LENS IMPLANT Right 6/22/2015    Procedure: PHACOEMULSIFICATION CLEAR CORNEA WITH STANDARD INTRAOCULAR LENS IMPLANT;  Surgeon: Nixon Farnsworth MD;  Location: [facility]     SEPTOPLASTY, TURBINOPLASTY, COMBINED Bilateral 8/18/2016    Procedure: COMBINED SEPTOPLASTY, TURBINOPLASTY;  Surgeon: Ivan Hernandez MD;  Location: [facility]       FAMILY HISTORY:  Family History   Problem Relation Age of Onset     Genitourinary Problems Father      d: age 89; ARF     Hypertension Father      DIABETES Mother      d: age 68, CAD     HEART DISEASE Mother      MI     Myocardial Infarction Mother      Cardiovascular Brother      d:  age 31; CAD     HEART DISEASE Brother      age 31, MI     DIABETES Sister      b:  1939; DM2       SOCIAL HISTORY:  Social History     Social History      "Marital status:      Spouse name: N/A     Number of children: N/A     Years of education: N/A     Social History Main Topics     Smoking status: Former Smoker     Packs/day: 1.00     Years: 6.00     Quit date: 7/1/1964     Smokeless tobacco: Never Used      Comment: quit 1964     Alcohol use No     Drug use: No     Sexual activity: Yes     Partners: Female     Other Topics Concern     Caffeine Concern Yes     2 cups coffee a day     Sleep Concern Yes     sleep apnea, wears cpap off and on     Stress Concern No     Weight Concern No     Special Diet Yes     diabetic diet      Exercise No     occ walk the mall     Seat Belt Yes     Social History Narrative       Review of Systems:  Skin:  Positive for rash face   Eyes:  Positive for glasses reading  ENT:  Negative      Respiratory:  Negative for shortness of breath;dyspnea on exertion BRANDON without CPAP therapy   Cardiovascular:  Negative for;palpitations;edema;lightheadedness;dizziness fatigue;Positive for Fatigue remains improved post cardioversion 5/2018 ; improved  Gastroenterology: Positive for heartburn No melena/hematochezia  Genitourinary:  not assessed      Musculoskeletal:  Positive for foot pain occ  Neurologic:  Negative      Psychiatric:  Negative      Heme/Lymph/Imm:  Negative      Endocrine:  Positive for diabetes;thyroid disorder;night sweats      Physical Exam:  Vitals: /71  Pulse 91  Ht 1.803 m (5' 11\")  Wt 94.3 kg (208 lb)  BMI 29.01 kg/m2    Constitutional:  cooperative, alert and oriented, well developed, well nourished, in no acute distress        Skin:  warm and dry to the touch, no apparent skin lesions or masses noted          Head:  normocephalic, no masses or lesions        Eyes:  pupils equal and round;conjunctivae and lids unremarkable;sclera white;no xanthalasma        Lymph:      ENT:  no pallor or cyanosis, dentition good        Neck:  JVP normal;no carotid bruit        Respiratory:  normal breath sounds, clear to " auscultation, normal A-P diameter, normal symmetry, normal respiratory excursion, no use of accessory muscles         Cardiac: regular rhythm, normal S1/S2, no S3 or S4, apical impulse not displaced, no murmurs, gallops or rubs                pulses full and equal                                        GI:  abdomen soft obese      Extremities and Muscular Skeletal:  no edema;no deformities, clubbing, cyanosis, erythema observed              Neurological:  no gross motor deficits        Psych:  Alert and Oriented x 3                Thank you for allowing me to participate in the care of your patient.      Sincerely,     Rebekah Marrero PA-C     [facility]    cc:   Satnam Kasper MD  [address] [place] [id number]

## 2018-06-19 DIAGNOSIS — E34.9 TESTOSTERONE DEFICIENCY: ICD-10-CM

## 2018-06-19 NOTE — TELEPHONE ENCOUNTER
Last Written Prescription Date:  12/28/17  Last Fill Quantity: 10,  # refills: 1   Last office visit: 5/1/2018 with prescribing provider:     Future Office Visit:   Next 5 appointments (look out 90 days)     Sep 13, 2018  4:15 PM CDT   Return Visit with Spedey Olivas MD   [facility] ([facility])    [address] [place] [id number]   [phone] OPT 2                 Routing refill request to provider for review/approval because:  Drug not on the [facility] refill protocol

## 2018-06-20 RX ORDER — TESTOSTERONE CYPIONATE 200 MG/ML
300 INJECTION, SOLUTION INTRAMUSCULAR
Qty: 10 ML | Refills: 0 | Status: SHIPPED | OUTPATIENT
Start: 2018-06-20 | End: 2018-09-11

## 2018-06-20 NOTE — TELEPHONE ENCOUNTER
Labs UTD. Future orders in chart for labs in August. Rx printed and brought to the [facility] pharmacy by this MD. Inform pt and remind him to have fasting labs in August. Please time the lab appointment date so that it is the same day as  when he is due for his testosterone injection and have the labs done  BEFORE  getting the injection that day from his wife

## 2018-08-13 DIAGNOSIS — Z12.5 SCREENING FOR PROSTATE CANCER: ICD-10-CM

## 2018-08-13 DIAGNOSIS — E11.9 TYPE 2 DIABETES MELLITUS WITHOUT COMPLICATION, WITH LONG-TERM CURRENT USE OF INSULIN (H): ICD-10-CM

## 2018-08-13 DIAGNOSIS — E11.9 TYPE 2 DIABETES MELLITUS WITHOUT COMPLICATION (H): ICD-10-CM

## 2018-08-13 DIAGNOSIS — Z79.4 TYPE 2 DIABETES MELLITUS WITHOUT COMPLICATION, WITH LONG-TERM CURRENT USE OF INSULIN (H): ICD-10-CM

## 2018-08-13 DIAGNOSIS — E34.9 TESTOSTERONE DEFICIENCY: ICD-10-CM

## 2018-08-13 LAB
ALBUMIN SERPL-MCNC: 4.1 G/DL (ref 3.4–5)
ALP SERPL-CCNC: 58 U/L (ref 40–150)
ALT SERPL W P-5'-P-CCNC: 21 U/L (ref 0–70)
ANION GAP SERPL CALCULATED.3IONS-SCNC: 6 MMOL/L (ref 3–14)
AST SERPL W P-5'-P-CCNC: 18 U/L (ref 0–45)
BILIRUB SERPL-MCNC: 1.1 MG/DL (ref 0.2–1.3)
BUN SERPL-MCNC: 15 MG/DL (ref 7–30)
CALCIUM SERPL-MCNC: 9 MG/DL (ref 8.5–10.1)
CHLORIDE SERPL-SCNC: 105 MMOL/L (ref 94–109)
CHOLEST SERPL-MCNC: 105 MG/DL
CO2 SERPL-SCNC: 30 MMOL/L (ref 20–32)
CREAT SERPL-MCNC: 0.97 MG/DL (ref 0.66–1.25)
CREAT UR-MCNC: 161 MG/DL
ERYTHROCYTE [DISTWIDTH] IN BLOOD BY AUTOMATED COUNT: 14.3 % (ref 10–15)
GFR SERPL CREATININE-BSD FRML MDRD: 75 ML/MIN/1.7M2
GLUCOSE SERPL-MCNC: 97 MG/DL (ref 70–99)
HBA1C MFR BLD: 7.2 % (ref 0–5.6)
HCT VFR BLD AUTO: 45.3 % (ref 40–53)
HDLC SERPL-MCNC: 32 MG/DL
HGB BLD-MCNC: 15.6 G/DL (ref 13.3–17.7)
LDLC SERPL CALC-MCNC: 38 MG/DL
MCH RBC QN AUTO: 30.3 PG (ref 26.5–33)
MCHC RBC AUTO-ENTMCNC: 34.4 G/DL (ref 31.5–36.5)
MCV RBC AUTO: 88 FL (ref 78–100)
MICROALBUMIN UR-MCNC: 18 MG/L
MICROALBUMIN/CREAT UR: 11.06 MG/G CR (ref 0–17)
NONHDLC SERPL-MCNC: 73 MG/DL
PLATELET # BLD AUTO: 182 10E9/L (ref 150–450)
POTASSIUM SERPL-SCNC: 4.3 MMOL/L (ref 3.4–5.3)
PROT SERPL-MCNC: 6.9 G/DL (ref 6.8–8.8)
PSA SERPL-ACNC: 3.81 UG/L (ref 0–4)
RBC # BLD AUTO: 5.15 10E12/L (ref 4.4–5.9)
SODIUM SERPL-SCNC: 141 MMOL/L (ref 133–144)
TRIGL SERPL-MCNC: 175 MG/DL
TSH SERPL DL<=0.005 MIU/L-ACNC: 0.99 MU/L (ref 0.4–4)
WBC # BLD AUTO: 4.3 10E9/L (ref 4–11)

## 2018-08-13 PROCEDURE — 84403 ASSAY OF TOTAL TESTOSTERONE: CPT | Performed by: INTERNAL MEDICINE

## 2018-08-13 PROCEDURE — 36415 COLL VENOUS BLD VENIPUNCTURE: CPT | Performed by: INTERNAL MEDICINE

## 2018-08-13 PROCEDURE — 80061 LIPID PANEL: CPT | Performed by: INTERNAL MEDICINE

## 2018-08-13 PROCEDURE — 85027 COMPLETE CBC AUTOMATED: CPT | Performed by: INTERNAL MEDICINE

## 2018-08-13 PROCEDURE — 82043 UR ALBUMIN QUANTITATIVE: CPT | Performed by: INTERNAL MEDICINE

## 2018-08-13 PROCEDURE — 83036 HEMOGLOBIN GLYCOSYLATED A1C: CPT | Performed by: INTERNAL MEDICINE

## 2018-08-13 PROCEDURE — 80053 COMPREHEN METABOLIC PANEL: CPT | Performed by: INTERNAL MEDICINE

## 2018-08-13 PROCEDURE — 84443 ASSAY THYROID STIM HORMONE: CPT | Performed by: INTERNAL MEDICINE

## 2018-08-13 PROCEDURE — G0103 PSA SCREENING: HCPCS | Performed by: INTERNAL MEDICINE

## 2018-08-13 PROCEDURE — 84270 ASSAY OF SEX HORMONE GLOBUL: CPT | Performed by: INTERNAL MEDICINE

## 2018-08-13 NOTE — TELEPHONE ENCOUNTER
Last Written Prescription Date:  3/14/18  Last Fill Quantity: 60,  # refills: 4   Last office visit: 5/1/2018 with prescribing provider:  Fide    Future Office Visit:   Next 5 appointments (look out 90 days)     Sep 13, 2018  4:15 PM CDT   Return Visit with Speedy Olivas MD   [facility] ([facility])    [address] [place] [id number] [phone] OPT 2                 Requested Prescriptions   Pending Prescriptions Disp Refills     metFORMIN (GLUCOPHAGE) 1000 MG tablet [Pharmacy Med Name: METFORMIN HCL 1000MG TABS] 60 tablet 4     Sig: TAKE ONE TABLET BY MOUTH TWO TIMES A DAY WITH MEALS    Biguanide Agents Passed    8/13/2018 11:47 AM       Passed - Blood pressure less than 140/90 in past 6 months    BP Readings from Last 3 Encounters:   05/25/18 128/71   05/01/18 134/86   04/24/18 142/80                Passed - Patient has documented LDL within the past 12 mos.    Recent Labs   Lab Test  08/13/18   0849   LDL  38            Passed - Patient has had a Microalbumin in the past 12 mos.    Recent Labs   Lab Test  09/13/17   0919   MICROL  19   UMALCR  13.90            Passed - Patient is age 10 or older       Passed - Patient has documented A1c within the specified period of time.    If HgbA1C is 8 or greater, it needs to be on file within the past 3 months.  If less than 8, must be on file within the past 6 months.     Recent Labs   Lab Test  08/13/18   0849   A1C  7.2*            Passed - Patient's CR is NOT>1.4 OR Patient's EGFR is NOT<45 within past 12 mos.    Recent Labs   Lab Test  08/13/18   0849   GFRESTIMATED  75   GFRESTBLACK  >90       Recent Labs   Lab Test  08/13/18   0849   CR  0.97            Passed - Patient does NOT have a diagnosis of CHF.       Passed - Recent (6 mo) or future (30 days) visit within the authorizing provider's specialty    Patient had office visit in the last 6 months or has a visit in the next 30 days with  "authorizing provider or within the authorizing provider's specialty.  See \"Patient Info\" tab in inbasket, or \"Choose Columns\" in Meds & Orders section of the refill encounter.              "

## 2018-08-15 LAB
SHBG SERPL-SCNC: 8 NMOL/L (ref 11–80)
TESTOST FREE SERPL-MCNC: 16.35 NG/DL (ref 4.7–24.4)
TESTOST SERPL-MCNC: 467 NG/DL (ref 240–950)

## 2018-09-03 DIAGNOSIS — E78.5 HYPERLIPIDEMIA LDL GOAL <70: ICD-10-CM

## 2018-09-03 NOTE — TELEPHONE ENCOUNTER
"Requested Prescriptions   Pending Prescriptions Disp Refills     gemfibrozil (LOPID) 600 MG tablet [Pharmacy Med Name: GEMFIBROZIL 600MG TABS]  Last Written Prescription Date:  03/07/2018  Last Fill Quantity: 180,  # refills: 01   Last Office Visit: 5/1/2018   Future Office Visit:    Next 5 appointments (look out 90 days)     Sep 13, 2018  4:15 PM CDT   Return Visit with Speedy Olivas MD   [facility] ([facility])    [address] [place] [id number] [phone] OPT 2                  180 tablet 1     Sig: TAKE ONE TABLET BY MOUTH TWICE A DAY    Fibrates Passed    9/3/2018 12:26 PM       Passed - Lipid panel on file in past 12 months    Recent Labs   Lab Test  08/13/18   0849   05/08/15   0911   CHOL  105   < >  124   TRIG  175*   < >  110   HDL  32*   < >  38*   LDL  38   < >  64   NHDL  73   < >   --    VLDL   --    --   22   CHOLHDLRATIO   --    --   3.3    < > = values in this interval not displayed.              Passed - No abnormal creatine kinase in past 12 months    No lab results found.            Passed - Recent (12 mo) or future (30 days) visit within the authorizing provider's specialty    Patient had office visit in the last 12 months or has a visit in the next 30 days with authorizing provider or within the authorizing provider's specialty.  See \"Patient Info\" tab in inbasket, or \"Choose Columns\" in Meds & Orders section of the refill encounter.           Passed - Patient is age 18 or older          "

## 2018-09-05 RX ORDER — GEMFIBROZIL 600 MG/1
TABLET, FILM COATED ORAL
Qty: 180 TABLET | Refills: 3 | Status: SHIPPED | OUTPATIENT
Start: 2018-09-05 | End: 2018-09-13

## 2018-09-10 DIAGNOSIS — E78.5 HYPERLIPIDEMIA LDL GOAL <70: ICD-10-CM

## 2018-09-11 DIAGNOSIS — E34.9 TESTOSTERONE DEFICIENCY: ICD-10-CM

## 2018-09-11 RX ORDER — TESTOSTERONE CYPIONATE 200 MG/ML
300 INJECTION, SOLUTION INTRAMUSCULAR
Qty: 10 ML | Refills: 1 | Status: SHIPPED | OUTPATIENT
Start: 2018-09-11 | End: 2019-02-20

## 2018-09-11 RX ORDER — PRAVASTATIN SODIUM 80 MG/1
TABLET ORAL
Qty: 90 TABLET | Refills: 2 | Status: SHIPPED | OUTPATIENT
Start: 2018-09-11 | End: 2018-09-13

## 2018-09-11 NOTE — TELEPHONE ENCOUNTER
Lab monitoring up-to-date.  Paper prescription printed and in [facility] basket.  Please bring to the [facility] pharmacy to have filled and inform patient

## 2018-09-11 NOTE — TELEPHONE ENCOUNTER
Requested Prescriptions   Pending Prescriptions Disp Refills     testosterone cypionate (DEPOTESTOTERONE) 200 MG/ML injection 10 mL 0     Sig: Inject 1.5 mLs (300 mg) into the muscle every 14 days    There is no refill protocol information for this order        Last Written Prescription Date:  6/20/18  Last Fill Quantity: 10 ML,  # refills: 0   Last office visit: 5/1/2018 with prescribing provider:     Future Office Visit:   Next 5 appointments (look out 90 days)     Sep 13, 2018  4:15 PM CDT   Return Visit with Speedy Olivas MD   [facility] ([facility])    [address] [place] [id number] [phone] OPT 2                 Routing refill request to provider for review/approval because:  Drug not on the [facility] refill protocol

## 2018-09-11 NOTE — TELEPHONE ENCOUNTER
"Requested Prescriptions   Pending Prescriptions Disp Refills     pravastatin (PRAVACHOL) 80 MG tablet [Pharmacy Med Name: PRAVASTATIN SODIUM 80MG TABS] 90 tablet 1     Sig: TAKE ONE TABLET BY MOUTH EVERY NIGHT AT BEDTIME    Statins Protocol Passed    9/10/2018  2:54 PM       Passed - LDL on file in past 12 months    Recent Labs   Lab Test  08/13/18   0849   LDL  38            Passed - No abnormal creatine kinase in past 12 months    No lab results found.            Passed - Recent (12 mo) or future (30 days) visit within the authorizing provider's specialty    Patient had office visit in the last 12 months or has a visit in the next 30 days with authorizing provider or within the authorizing provider's specialty.  See \"Patient Info\" tab in inbasket, or \"Choose Columns\" in Meds & Orders section of the refill encounter.           Passed - Patient is age 18 or older        Last Written Prescription Date:  3/07/2018  Last Fill Quantity: 90,  # refills: 1   Last office visit: 5/1/2018 with prescribing provider:  5/01/2018   Future Office Visit:   Next 5 appointments (look out 90 days)     Sep 13, 2018  4:15 PM CDT   Return Visit with Speedy Olivas MD   [facility] ([facility])    [address] [place] [id number] [phone] OPT 2                   "

## 2018-09-13 ENCOUNTER — OFFICE VISIT (OUTPATIENT)
Dept: CARDIOLOGY | Facility: CLINIC | Age: 78
End: 2018-09-13
Payer: COMMERCIAL

## 2018-09-13 VITALS
DIASTOLIC BLOOD PRESSURE: 64 MMHG | BODY MASS INDEX: 28.25 KG/M2 | HEART RATE: 68 BPM | WEIGHT: 201.8 LBS | SYSTOLIC BLOOD PRESSURE: 124 MMHG | HEIGHT: 71 IN

## 2018-09-13 DIAGNOSIS — I48.91 ATRIAL FIBRILLATION, UNSPECIFIED TYPE (H): ICD-10-CM

## 2018-09-13 DIAGNOSIS — I10 ESSENTIAL HYPERTENSION, BENIGN: ICD-10-CM

## 2018-09-13 DIAGNOSIS — R79.89 ELEVATED TROPONIN: ICD-10-CM

## 2018-09-13 DIAGNOSIS — I25.10 ATHEROSCLEROSIS OF NATIVE CORONARY ARTERY OF NATIVE HEART WITHOUT ANGINA PECTORIS: ICD-10-CM

## 2018-09-13 DIAGNOSIS — I48.3 TYPICAL ATRIAL FLUTTER (H): ICD-10-CM

## 2018-09-13 DIAGNOSIS — G47.33 OSA (OBSTRUCTIVE SLEEP APNEA): ICD-10-CM

## 2018-09-13 DIAGNOSIS — I27.20 PULMONARY HYPERTENSION (H): Primary | ICD-10-CM

## 2018-09-13 DIAGNOSIS — E78.5 HYPERLIPIDEMIA LDL GOAL <70: ICD-10-CM

## 2018-09-13 PROCEDURE — 99214 OFFICE O/P EST MOD 30 MIN: CPT | Performed by: INTERNAL MEDICINE

## 2018-09-13 RX ORDER — LISINOPRIL 30 MG/1
30 TABLET ORAL DAILY
Qty: 90 TABLET | Refills: 3 | Status: ON HOLD | OUTPATIENT
Start: 2018-09-13 | End: 2019-03-18

## 2018-09-13 RX ORDER — METOPROLOL TARTRATE 50 MG
50 TABLET ORAL 2 TIMES DAILY
Qty: 180 TABLET | Refills: 3 | Status: SHIPPED | OUTPATIENT
Start: 2018-09-13 | End: 2019-01-31

## 2018-09-13 RX ORDER — PRAVASTATIN SODIUM 80 MG/1
TABLET ORAL
Qty: 90 TABLET | Refills: 3 | Status: ON HOLD | OUTPATIENT
Start: 2018-09-13 | End: 2019-03-18

## 2018-09-13 RX ORDER — GEMFIBROZIL 600 MG/1
600 TABLET, FILM COATED ORAL 2 TIMES DAILY
Qty: 180 TABLET | Refills: 3 | Status: ON HOLD | OUTPATIENT
Start: 2018-09-13 | End: 2019-03-18

## 2018-09-13 NOTE — PROGRESS NOTES
"Pascual tells me that since he has been back in sinus rhythm he does feel like he has more stamina.  He mentions working out in the yard and Shanon tells me that he can do her \"honeydew list.\"  He does continue to note some fatigue and admits that he stopped using his CPAP a number of years ago secondary to being \"dry.\"  He has subsequently had deviated septum surgery.  He follows closely with Dr. Elizalde.     1.  Atrial fibrillation.  He was first diagnosed with this back in 05/2016 and now again in 04/2018 in the setting of a viral illness.  At this time, it does appear that he has noticed a significant change in his stamina levels in normal rhythm.  Though he does have underlying fatigue, he states that it was \"way worse\" when he was in AFib.       At this time, we will have him continue to monitor his heart rhythm routinely.  If he were to go back into atrial fibrillation we would likely start an antiarrhythmic such as amiodarone with plans for repeat cardioversion.       He will remain on his Eliquis given his high CHADS-VASc score.       2.  Coronary disease.  He had bypass surgery many years ago.  He has metabolic syndrome and continues to follow with Dr. Elizalde and Dr. Olivas.  He will see Dr. Olivas now in about 6 months.  Of note, his last stress test in 04/2018 done in the hospital showed a very small area of ischemia in the inferior mid ventricle.  Ejection fraction remained normal and he was asymptomatic with chest discomfort (which he had previously had with his previous bypass and angiogram in 04/2012) and medical management was recommended.       HPI and Plan:   See dictation            No orders of the defined types were placed in this encounter.    No orders of the defined types were placed in this encounter.    Medications Discontinued During This Encounter   Medication Reason     lisinopril (PRINIVIL/ZESTRIL) 20 MG tablet Dose adjustment         Encounter Diagnoses   Name Primary?     Elevated troponin "      Atherosclerosis of native coronary artery of native heart without angina pectoris        CURRENT MEDICATIONS:  Current Outpatient Prescriptions   Medication Sig Dispense Refill     apixaban ANTICOAGULANT (ELIQUIS) 5 MG tablet Take 1 tablet (5 mg) by mouth 2 times daily 60 tablet 11     blood glucose monitoring (ONE TOUCH ULTRA) test strip Test twice daily with One Touch Ultra Blue 200 each 3     Cholecalciferol (VITAMIN D3 PO) Take 5,000 Units by mouth daily       cyanocolbalamin (VITAMIN  B-12) 1000 MCG tablet Take 5,000 mcg by mouth daily.       ferrous fumarate 65 mg, [place]. FE,-Vitamin C 125 mg (VITRON-C)  MG TABS tablet 1 tab twice a day for anemia 60 tablet 11     fluticasone (FLONASE) 50 MCG/ACT spray USE ONE TO TWO SPRAYS IN EACH NOSTRIL EVERY DAY 48 g 3     gemfibrozil (LOPID) 600 MG tablet TAKE ONE TABLET BY MOUTH TWICE A  tablet 3     GLUCOSE 4 GM OR CHEW as directed 30 11     insulin aspart (NOVOLOG FLEXPEN) 100 UNIT/ML injection Inject 3 times daily (before meals). 2 units per carb choice (approx 5-10 units/meal) 30 mL 3     insulin detemir (LEVEMIR FLEXPEN/FLEXTOUCH) 100 UNIT/ML injection Inject 40-45 Units Subcutaneous At Bedtime 45 mL 3     insulin pen needle (NOVOFINE 30) 30G X 8 MM 1 Syringe 4 times daily 400 each 3     levothyroxine (SYNTHROID/LEVOTHROID) 125 MCG tablet TAKE ONE TABLET BY MOUTH EVERY DAY 90 tablet 3     lisinopril (PRINIVIL,ZESTRIL) 30 MG tablet Take 1 tablet (30 mg) by mouth daily 90 tablet 3     loratadine (CLARITIN) 10 MG tablet TAKE ONE TABLET BY MOUTH EVERY DAY 90 tablet 3     metFORMIN (GLUCOPHAGE) 1000 MG tablet TAKE ONE TABLET BY MOUTH TWO TIMES A DAY WITH MEALS 60 tablet 2     metoprolol tartrate (LOPRESSOR) 50 MG tablet TAKE ONE TABLET BY MOUTH TWICE A  tablet 3     nitroglycerin (NITROSTAT) 0.4 MG SL tablet Place 1 tablet (0.4 mg) under the tongue every 5 minutes as needed for chest pain 25 tablet 1     ONETOUCH DELICA LANCETS 33G MISC 1 Device 3  "times daily . Use to test blood sugars 3 times daily or as directed. 300 each prn     order for DME Equipment being ordered: 3ml syringes and 22G 1 1/2\" needles to use with testosterone inj every 2 weeks 30 Device 1     pravastatin (PRAVACHOL) 80 MG tablet TAKE ONE TABLET BY MOUTH EVERY NIGHT AT BEDTIME 90 tablet 2     ranitidine (ZANTAC) 150 MG tablet TAKE ONE TABLET BY MOUTH TWICE A  tablet 3     testosterone cypionate (DEPOTESTOTERONE) 200 MG/ML injection Inject 1.5 mLs (300 mg) into the muscle every 14 days 10 mL 1     [DISCONTINUED] lisinopril (PRINIVIL/ZESTRIL) 20 MG tablet TAKE ONE TABLET BY MOUTH EVERY DAY 90 tablet 3       ALLERGIES     Allergies   Allergen Reactions     Omeprazole      diarrhea       PAST MEDICAL HISTORY:  Past Medical History:   Diagnosis Date     Adhesive capsulitis of shoulder      Anemia 3/10/2014     Anemia, unspecified      Antiplatelet or antithrombotic long-term use      Arrhythmia     Atrial fib/flutter     CAD (coronary artery disease)      History of cardioversion 04/24/2018    a single synchronized shock of 120 joules restored normal sinus rhythm     Hyperlipidemia LDL goal <70 5/26/2011     Hypertension      Hypothyroidism      Impotence of organic origin      Laceration of finger  June 2010     left thumb s/p sutures     Numbness and tingling     diabetic neuropathy bilateral feet     BRANDON (obstructive sleep apnea)     intolerant of CPAP, uses it occasionally.  Using mandibular device occasionally     Osteopenia      Pulmonary hypertension 4/6/2012     Sensorineural hearing loss, unspecified      Stented coronary artery 1997    CABG      Testosterone deficiency      Type 2 diabetes mellitus without complication  (goal A1C<8) 10/24/2015     Typical atrial flutter (H) 4/18/16     Unspecified hypothyroidism      Vitamin D deficiency 9/3/2011       PAST SURGICAL HISTORY:  Past Surgical History:   Procedure Laterality Date     C NONSPECIFIC PROCEDURE  1995    CABG " (Scientologist)     C NONSPECIFIC PROCEDURE  8/01    stress echo     CARDIAC SURGERY      bypass in 1997     CARDIOVERSION  04/24/2018     COLONOSCOPY       CORONARY ANGIOGRAPHY ADULT ORDER      4/2/2012     CORONARY ARTERY BYPASS  1997    [facility] [place] [facility] [place]     EXCISE MASS HEAD Left 8/18/2016    Procedure: EXCISE MASS HEAD;  Surgeon: Ivan Hernandez MD;  Location: [facility]     HEART CATH, ANGIOPLASTY  4/2012     PHACOEMULSIFICATION CLEAR CORNEA WITH STANDARD INTRAOCULAR LENS IMPLANT Left 6/8/2015    Procedure: PHACOEMULSIFICATION CLEAR CORNEA WITH STANDARD INTRAOCULAR LENS IMPLANT;  Surgeon: Nixon Farnsworth MD;  Location:  [facility]     PHACOEMULSIFICATION CLEAR CORNEA WITH STANDARD INTRAOCULAR LENS IMPLANT Right 6/22/2015    Procedure: PHACOEMULSIFICATION CLEAR CORNEA WITH STANDARD INTRAOCULAR LENS IMPLANT;  Surgeon: Nixon Farnsworth MD;  Location:  [facility]     SEPTOPLASTY, TURBINOPLASTY, COMBINED Bilateral 8/18/2016    Procedure: COMBINED SEPTOPLASTY, TURBINOPLASTY;  Surgeon: Ivan Hernandez MD;  Location: [facility]       FAMILY HISTORY:  Family History   Problem Relation Age of Onset     Genitourinary Problems Father      d: age 89; ARF     Hypertension Father      Diabetes Mother      d: age 68, CAD     HEART DISEASE Mother      MI     Myocardial Infarction Mother      Cardiovascular Brother      d:  age 31; CAD     HEART DISEASE Brother      age 31, MI     Diabetes Sister      b:  1939; DM2       SOCIAL HISTORY:  Social History     Social History     Marital status:      Spouse name: N/A     Number of children: N/A     Years of education: N/A     Social History Main Topics     Smoking status: Former Smoker     Packs/day: 1.00     Years: 6.00     Quit date: 7/1/1964     Smokeless tobacco: Never Used      Comment: quit 1964     Alcohol use No     Drug use: No     Sexual activity: Yes     Partners: Female     Other Topics Concern     Caffeine Concern Yes     2 cups coffee a day     Sleep  "Concern Yes     sleep apnea, wears cpap off and on     Stress Concern No     Weight Concern No     Special Diet Yes     diabetic diet      Exercise No     occ walk the mall     Seat Belt Yes     Social History Narrative         Review of Systems:  Skin:  Negative       Eyes:  Positive for glasses reading  ENT:  Positive for nasal congestion chronic  Respiratory:  Positive for sleep apnea     Cardiovascular:    fatigue;Positive for    Gastroenterology: Positive for heartburn treated  Genitourinary:  Negative      Musculoskeletal:  Positive for joint pain right thumb  Neurologic:  Positive for numbness or tingling of feet    Psychiatric:  Negative      Heme/Lymph/Imm:  Negative      Endocrine:  Positive for diabetes;thyroid disorder;night sweats      Physical Exam:  Vitals: /64  Pulse 68  Ht 1.803 m (5' 11\")  Wt 91.5 kg (201 lb 12.8 oz)  BMI 28.15 kg/m2    Constitutional:           Skin:             Head:           Eyes:           Lymph:      ENT:           Neck:           Respiratory:            Cardiac:                                                           GI:           Extremities and Muscular Skeletal:                 Neurological:           Psych:           Recent Lab Results:  LIPID RESULTS:  Lab Results   Component Value Date    CHOL 105 08/13/2018    HDL 32 (L) 08/13/2018    LDL 38 08/13/2018    TRIG 175 (H) 08/13/2018    CHOLHDLRATIO 3.3 05/08/2015       LIVER ENZYME RESULTS:  Lab Results   Component Value Date    AST 18 08/13/2018    ALT 21 08/13/2018       CBC RESULTS:  Lab Results   Component Value Date    WBC 4.3 08/13/2018    RBC 5.15 08/13/2018    HGB 15.6 08/13/2018    HCT 45.3 08/13/2018    MCV 88 08/13/2018    MCH 30.3 08/13/2018    MCHC 34.4 08/13/2018    RDW 14.3 08/13/2018     08/13/2018       BMP RESULTS:  Lab Results   Component Value Date     08/13/2018    POTASSIUM 4.3 08/13/2018    CHLORIDE 105 08/13/2018    CO2 30 08/13/2018    ANIONGAP 6 08/13/2018    GLC 97 " 08/13/2018    BUN 15 08/13/2018    CR 0.97 08/13/2018    GFRESTIMATED 75 08/13/2018    GFRESTBLACK >90 08/13/2018    STARR 9.0 08/13/2018        A1C RESULTS:  Lab Results   Component Value Date    A1C 7.2 (H) 08/13/2018       INR RESULTS:  Lab Results   Component Value Date    INR 0.94 03/30/2012    INR 0.90 05/12/2005           CC  Marcela Mccain MD  [id number]  [place] [id number]

## 2018-09-13 NOTE — LETTER
"9/13/2018    Andrew Elizalde MD  [address] [facility] [address]    RE: Pascual Esteswrocki       Dear Colleague,    I had the pleasure of seeing Pascual Perea in the [facility].    Pascual tells me that since he has been back in sinus rhythm he does feel like he has more stamina.  He mentions working out in the yard and Shanon tells me that he can do her \"honeydew list.\"  He does continue to note some fatigue and admits that he stopped using his CPAP a number of years ago secondary to being \"dry.\"  He has subsequently had deviated septum surgery.  He follows closely with Dr. Elizalde.     1.  Atrial fibrillation.  He was first diagnosed with this back in 05/2016 and now again in 04/2018 in the setting of a viral illness.  At this time, it does appear that he has noticed a significant change in his stamina levels in normal rhythm.  Though he does have underlying fatigue, he states that it was \"way worse\" when he was in AFib.       At this time, we will have him continue to monitor his heart rhythm routinely.  If he were to go back into atrial fibrillation we would likely start an antiarrhythmic such as amiodarone with plans for repeat cardioversion.       He will remain on his Eliquis given his high CHADS-VASc score.       2.  Coronary disease.  He had bypass surgery many years ago.  He has metabolic syndrome and continues to follow with Dr. Elizalde and Dr. Olivas.  He will see Dr. Olivas now in about 6 months.  Of note, his last stress test in 04/2018 done in the hospital showed a very small area of ischemia in the inferior mid ventricle.  Ejection fraction remained normal and he was asymptomatic with chest discomfort (which he had previously had with his previous bypass and angiogram in 04/2012) and medical management was recommended.       HPI and Plan:   See dictation            No orders of the defined types were placed in this encounter.    No orders of the defined types were placed " in this encounter.    Medications Discontinued During This Encounter   Medication Reason     lisinopril (PRINIVIL/ZESTRIL) 20 MG tablet Dose adjustment         Encounter Diagnoses   Name Primary?     Elevated troponin      Atherosclerosis of native coronary artery of native heart without angina pectoris        CURRENT MEDICATIONS:  Current Outpatient Prescriptions   Medication Sig Dispense Refill     apixaban ANTICOAGULANT (ELIQUIS) 5 MG tablet Take 1 tablet (5 mg) by mouth 2 times daily 60 tablet 11     blood glucose monitoring (ONE TOUCH ULTRA) test strip Test twice daily with One Touch Ultra Blue 200 each 3     Cholecalciferol (VITAMIN D3 PO) Take 5,000 Units by mouth daily       cyanocolbalamin (VITAMIN  B-12) 1000 MCG tablet Take 5,000 mcg by mouth daily.       ferrous fumarate 65 mg, [place]. FE,-Vitamin C 125 mg (VITRON-C)  MG TABS tablet 1 tab twice a day for anemia 60 tablet 11     fluticasone (FLONASE) 50 MCG/ACT spray USE ONE TO TWO SPRAYS IN EACH NOSTRIL EVERY DAY 48 g 3     gemfibrozil (LOPID) 600 MG tablet TAKE ONE TABLET BY MOUTH TWICE A  tablet 3     GLUCOSE 4 GM OR CHEW as directed 30 11     insulin aspart (NOVOLOG FLEXPEN) 100 UNIT/ML injection Inject 3 times daily (before meals). 2 units per carb choice (approx 5-10 units/meal) 30 mL 3     insulin detemir (LEVEMIR FLEXPEN/FLEXTOUCH) 100 UNIT/ML injection Inject 40-45 Units Subcutaneous At Bedtime 45 mL 3     insulin pen needle (NOVOFINE 30) 30G X 8 MM 1 Syringe 4 times daily 400 each 3     levothyroxine (SYNTHROID/LEVOTHROID) 125 MCG tablet TAKE ONE TABLET BY MOUTH EVERY DAY 90 tablet 3     lisinopril (PRINIVIL,ZESTRIL) 30 MG tablet Take 1 tablet (30 mg) by mouth daily 90 tablet 3     loratadine (CLARITIN) 10 MG tablet TAKE ONE TABLET BY MOUTH EVERY DAY 90 tablet 3     metFORMIN (GLUCOPHAGE) 1000 MG tablet TAKE ONE TABLET BY MOUTH TWO TIMES A DAY WITH MEALS 60 tablet 2     metoprolol tartrate (LOPRESSOR) 50 MG tablet TAKE ONE TABLET BY  "MOUTH TWICE A  tablet 3     nitroglycerin (NITROSTAT) 0.4 MG SL tablet Place 1 tablet (0.4 mg) under the tongue every 5 minutes as needed for chest pain 25 tablet 1     ONETOUCH DELICA LANCETS 33G MISC 1 Device 3 times daily . Use to test blood sugars 3 times daily or as directed. 300 each prn     order for DME Equipment being ordered: 3ml syringes and 22G 1 1/2\" needles to use with testosterone inj every 2 weeks 30 Device 1     pravastatin (PRAVACHOL) 80 MG tablet TAKE ONE TABLET BY MOUTH EVERY NIGHT AT BEDTIME 90 tablet 2     ranitidine (ZANTAC) 150 MG tablet TAKE ONE TABLET BY MOUTH TWICE A  tablet 3     testosterone cypionate (DEPOTESTOTERONE) 200 MG/ML injection Inject 1.5 mLs (300 mg) into the muscle every 14 days 10 mL 1     [DISCONTINUED] lisinopril (PRINIVIL/ZESTRIL) 20 MG tablet TAKE ONE TABLET BY MOUTH EVERY DAY 90 tablet 3       ALLERGIES     Allergies   Allergen Reactions     Omeprazole      diarrhea       PAST MEDICAL HISTORY:  Past Medical History:   Diagnosis Date     Adhesive capsulitis of shoulder      Anemia 3/10/2014     Anemia, unspecified      Antiplatelet or antithrombotic long-term use      Arrhythmia     Atrial fib/flutter     CAD (coronary artery disease)      History of cardioversion 04/24/2018    a single synchronized shock of 120 joules restored normal sinus rhythm     Hyperlipidemia LDL goal <70 5/26/2011     Hypertension      Hypothyroidism      Impotence of organic origin      Laceration of finger  June 2010     left thumb s/p sutures     Numbness and tingling     diabetic neuropathy bilateral feet     BRANDON (obstructive sleep apnea)     intolerant of CPAP, uses it occasionally.  Using mandibular device occasionally     Osteopenia      Pulmonary hypertension 4/6/2012     Sensorineural hearing loss, unspecified      Stented coronary artery 1997    CABG      Testosterone deficiency      Type 2 diabetes mellitus without complication  (goal A1C<8) 10/24/2015     Typical atrial " flutter (H) 4/18/16     Unspecified hypothyroidism      Vitamin D deficiency 9/3/2011       PAST SURGICAL HISTORY:  Past Surgical History:   Procedure Laterality Date     C NONSPECIFIC PROCEDURE  1995    CABG ([facility])     C NONSPECIFIC PROCEDURE  8/01    stress echo     CARDIAC SURGERY      bypass in 1997     CARDIOVERSION  04/24/2018     COLONOSCOPY       CORONARY ANGIOGRAPHY ADULT ORDER      4/2/2012     CORONARY ARTERY BYPASS  1997    [facility] to LAD     EXCISE MASS HEAD Left 8/18/2016    Procedure: EXCISE MASS HEAD;  Surgeon: Ivan Henrandez MD;  Location: [facility]     HEART CATH, ANGIOPLASTY  4/2012     PHACOEMULSIFICATION CLEAR CORNEA WITH STANDARD INTRAOCULAR LENS IMPLANT Left 6/8/2015    Procedure: PHACOEMULSIFICATION CLEAR CORNEA WITH STANDARD INTRAOCULAR LENS IMPLANT;  Surgeon: Nixon Farnsworth MD;  [facility]     PHACOEMULSIFICATION CLEAR CORNEA WITH STANDARD INTRAOCULAR LENS IMPLANT Right 6/22/2015    Procedure: PHACOEMULSIFICATION CLEAR CORNEA WITH STANDARD INTRAOCULAR LENS IMPLANT;  Surgeon: Nixon Farnsworth MD;  Location:  [facility]     SEPTOPLASTY, TURBINOPLASTY, COMBINED Bilateral 8/18/2016    Procedure: COMBINED SEPTOPLASTY, TURBINOPLASTY;  Surgeon: Ivan Hernandez MD;  Location: [facility]       FAMILY HISTORY:  Family History   Problem Relation Age of Onset     Genitourinary Problems Father      d: age 89; ARF     Hypertension Father      Diabetes Mother      d: age 68, CAD     HEART DISEASE Mother      MI     Myocardial Infarction Mother      Cardiovascular Brother      d:  age 31; CAD     HEART DISEASE Brother      age 31, MI     Diabetes Sister      b:  1939; DM2       SOCIAL HISTORY:  Social History     Social History     Marital status:      Spouse name: N/A     Number of children: N/A     Years of education: N/A     Social History Main Topics     Smoking status: Former Smoker     Packs/day: 1.00     Years: 6.00     Quit date: 7/1/1964     Smokeless  "tobacco: Never Used      Comment: quit 1964     Alcohol use No     Drug use: No     Sexual activity: Yes     Partners: Female     Other Topics Concern     Caffeine Concern Yes     2 cups coffee a day     Sleep Concern Yes     sleep apnea, wears cpap off and on     Stress Concern No     Weight Concern No     Special Diet Yes     diabetic diet      Exercise No     occ walk the mall     Seat Belt Yes     Social History Narrative         Review of Systems:  Skin:  Negative       Eyes:  Positive for glasses reading  ENT:  Positive for nasal congestion chronic  Respiratory:  Positive for sleep apnea     Cardiovascular:    fatigue;Positive for    Gastroenterology: Positive for heartburn treated  Genitourinary:  Negative      Musculoskeletal:  Positive for joint pain right thumb  Neurologic:  Positive for numbness or tingling of feet    Psychiatric:  Negative      Heme/Lymph/Imm:  Negative      Endocrine:  Positive for diabetes;thyroid disorder;night sweats      Physical Exam:  Vitals: /64  Pulse 68  Ht 1.803 m (5' 11\")  Wt 91.5 kg (201 lb 12.8 oz)  BMI 28.15 kg/m2    Constitutional:           Skin:             Head:           Eyes:           Lymph:      ENT:           Neck:           Respiratory:            Cardiac:                                                           GI:           Extremities and Muscular Skeletal:                 Neurological:           Psych:           Recent Lab Results:  LIPID RESULTS:  Lab Results   Component Value Date    CHOL 105 08/13/2018    HDL 32 (L) 08/13/2018    LDL 38 08/13/2018    TRIG 175 (H) 08/13/2018    CHOLHDLRATIO 3.3 05/08/2015       LIVER ENZYME RESULTS:  Lab Results   Component Value Date    AST 18 08/13/2018    ALT 21 08/13/2018       CBC RESULTS:  Lab Results   Component Value Date    WBC 4.3 08/13/2018    RBC 5.15 08/13/2018    HGB 15.6 08/13/2018    HCT 45.3 08/13/2018    MCV 88 08/13/2018    MCH 30.3 08/13/2018    MCHC 34.4 08/13/2018    RDW 14.3 08/13/2018    "  08/13/2018       BMP RESULTS:  Lab Results   Component Value Date     08/13/2018    POTASSIUM 4.3 08/13/2018    CHLORIDE 105 08/13/2018    CO2 30 08/13/2018    ANIONGAP 6 08/13/2018    GLC 97 08/13/2018    BUN 15 08/13/2018    CR 0.97 08/13/2018    GFRESTIMATED 75 08/13/2018    GFRESTBLACK >90 08/13/2018    STARR 9.0 08/13/2018        A1C RESULTS:  Lab Results   Component Value Date    A1C 7.2 (H) 08/13/2018       INR RESULTS:  Lab Results   Component Value Date    INR 0.94 03/30/2012    INR 0.90 05/12/2005           CC  Marcela Mccain MD  [address]  [place] [id number]                          Thank you for allowing me to participate in the care of your patient.      Sincerely,     HILLARY BREAUX MD     [facility]    cc:   Marcela Mccain MD  [address]  [place] [id number]

## 2018-09-13 NOTE — MR AVS SNAPSHOT
After Visit Summary   9/13/2018    Pascual Perea    MRN: [id number]           Patient Information     Date Of Birth          1940        Visit Information        Provider Department      9/13/2018 4:15 PM Speedy Olivas MD [facility]        Today's Diagnoses     Pulmonary hypertension    -  1    Elevated troponin        Atherosclerosis of native coronary artery of native heart without angina pectoris        BRANDON (obstructive sleep apnea)        Hyperlipidemia LDL goal <70        Essential hypertension, benign        Atrial fibrillation, unspecified type (H)        BENIGN HYPERTENSION        Typical atrial flutter (H)           Follow-ups after your visit        Who to contact     If you have questions or need follow up information about today's clinic visit or your schedule please contact [facility] directly at [phone].  Normal or non-critical lab and imaging results will be communicated to you by Dial a Dealerhart, letter or phone within 4 business days after the clinic has received the results. If you do not hear from us within 7 days, please contact the clinic through Dial a Dealerhart or phone. If you have a critical or abnormal lab result, we will notify you by phone as soon as possible.  Submit refill requests through [employer] or call your pharmacy and they will forward the refill request to us. Please allow 3 business days for your refill to be completed.          Additional Information About Your Visit        MyChart Information     [employer] gives you secure access to your electronic health record. If you see a primary care provider, you can also send messages to your care team and make appointments. If you have questions, please call your primary care clinic.  If you do not have a primary care provider, please call [phone] and they will assist you.        Care EveryWhere ID     This is your Care EveryWhere ID. This  "could be used by other organizations to access your [facility] medical records  [id number]        Your Vitals Were     Pulse Height BMI (Body Mass Index)             68 1.803 m (5' 11\") 28.15 kg/m2          Blood Pressure from Last 3 Encounters:   09/13/18 124/64   05/25/18 128/71   05/01/18 134/86    Weight from Last 3 Encounters:   09/13/18 91.5 kg (201 lb 12.8 oz)   05/25/18 94.3 kg (208 lb)   05/01/18 95.3 kg (210 lb)              We Performed the Following     Follow-Up with Cardiologist          Today's Medication Changes          These changes are accurate as of 9/13/18  5:09 PM.  If you have any questions, ask your nurse or doctor.               These medicines have changed or have updated prescriptions.        Dose/Directions    gemfibrozil 600 MG tablet   Commonly known as:  LOPID   This may have changed:  See the new instructions.   Used for:  Hyperlipidemia LDL goal <70   Changed by:  Speedy Olivas MD        Dose:  600 mg   Take 1 tablet (600 mg) by mouth 2 times daily   Quantity:  180 tablet   Refills:  3       lisinopril 30 MG tablet   Commonly known as:  PRINIVIL,ZESTRIL   This may have changed:  Another medication with the same name was removed. Continue taking this medication, and follow the directions you see here.   Used for:  Essential hypertension, benign   Changed by:  Speedy Olivas MD        Dose:  30 mg   Take 1 tablet (30 mg) by mouth daily   Quantity:  90 tablet   Refills:  3       metoprolol tartrate 50 MG tablet   Commonly known as:  LOPRESSOR   This may have changed:  See the new instructions.   Used for:  Essential hypertension, benign   Changed by:  Speedy Olivas MD        Dose:  50 mg   Take 1 tablet (50 mg) by mouth 2 times daily   Quantity:  180 tablet   Refills:  3            Where to get your medicines      These medications were sent to [facility] [address] [address]     Phone:  [phone]     " apixaban ANTICOAGULANT 5 MG tablet    gemfibrozil 600 MG tablet    lisinopril 30 MG tablet    metoprolol tartrate 50 MG tablet    pravastatin 80 MG tablet                Primary Care Provider Office Phone # Fax #    Andrew Elizalde [phone]       [address] [facility] [address]        Equal Access to Services     AMY FLORES : Hadii aad ku hadasho Soomaali, waaxda luqadaha, qaybta kaalmada adeegyada, waxay idiin hayaan adeeg kharash laWanaan . So [facility] [phone].    ATENCIÓN: Si habla español, tiene a cooley disposición servicios gratuitos de asistencia lingüística. Llame al [phone].    We comply with applicable federal civil rights laws and [place] laws. We do not discriminate on the basis of race, color, national origin, age, disability, sex, sexual orientation, or gender identity.            Thank you!     Thank you for choosing [facility]  for your care. Our goal is always to provide you with excellent care. Hearing back from our patients is one way we can continue to improve our services. Please take a few minutes to complete the written survey that you may receive in the mail after your visit with us. Thank you!             Your Updated Medication List - Protect others around you: Learn how to safely use, store and throw away your medicines at www.disposemymeds.org.          This list is accurate as of 9/13/18  5:09 PM.  Always use your most recent med list.                   Brand Name Dispense Instructions for use Diagnosis    apixaban ANTICOAGULANT 5 MG tablet    ELIQUIS    60 tablet    Take 1 tablet (5 mg) by mouth 2 times daily    Typical atrial flutter (H)       blood glucose monitoring test strip    ONETOUCH ULTRA    200 each    Test twice daily with One Touch Ultra Blue    Type 2 diabetes mellitus without complication, with long-term current use of insulin (H)       cyanocobalamin 1000 MCG tablet    vitamin  B-12     Take 5,000 mcg by  mouth daily.        ferrous fumarate 65 mg ([place]. FE)-Vitamin C 125 mg  MG Tabs tablet    VITRON-C    60 tablet    1 tab twice a day for anemia    Iron deficiency anemia, unspecified iron deficiency anemia type       fluticasone 50 MCG/ACT spray    FLONASE    48 g    USE ONE TO TWO SPRAYS IN EACH NOSTRIL EVERY DAY    Chronic allergic rhinitis, unspecified seasonality, unspecified trigger       gemfibrozil 600 MG tablet    LOPID    180 tablet    Take 1 tablet (600 mg) by mouth 2 times daily    Hyperlipidemia LDL goal <70       glucose 4 g Chew chewable tablet     30    as directed    Type II or unspecified type diabetes mellitus without mention of complication, not stated as uncontrolled       insulin aspart 100 UNIT/ML injection    NovoLOG FLEXPEN    30 mL    Inject 3 times daily (before meals). 2 units per carb choice (approx 5-10 units/meal)    Type 2 diabetes mellitus without complication, with long-term current use of insulin (H)       insulin detemir 100 UNIT/ML injection    LEVEMIR FLEXPEN/FLEXTOUCH    45 mL    Inject 40-45 Units Subcutaneous At Bedtime    Type 2 diabetes mellitus without complication, with long-term current use of insulin (H)       insulin pen needle 30G X 8 MM    NOVOFINE 30    400 each    1 Syringe 4 times daily    Type 2 diabetes mellitus without complication, with long-term current use of insulin (H)       levothyroxine 125 MCG tablet    SYNTHROID/LEVOTHROID    90 tablet    TAKE ONE TABLET BY MOUTH EVERY DAY    Hypothyroidism, unspecified type       lisinopril 30 MG tablet    PRINIVIL,ZESTRIL    90 tablet    Take 1 tablet (30 mg) by mouth daily    Essential hypertension, benign       loratadine 10 MG tablet    CLARITIN    90 tablet    TAKE ONE TABLET BY MOUTH EVERY DAY    Chronic allergic rhinitis, unspecified seasonality, unspecified trigger       metFORMIN 1000 MG tablet    GLUCOPHAGE    60 tablet    TAKE ONE TABLET BY MOUTH TWO TIMES A DAY WITH MEALS    Type 2 diabetes mellitus  "without complication (H)       metoprolol tartrate 50 MG tablet    LOPRESSOR    180 tablet    Take 1 tablet (50 mg) by mouth 2 times daily    Essential hypertension, benign       nitroGLYcerin 0.4 MG sublingual tablet    NITROSTAT    25 tablet    Place 1 tablet (0.4 mg) under the tongue every 5 minutes as needed for chest pain    Unstable angina (H)       ONETOUCH DELICA LANCETS 33G Misc     300 each    1 Device 3 times daily . Use to test blood sugars 3 times daily or as directed.    Type 2 diabetes, HbA1C goal < 8% (H)       order for DME     30 Device    Equipment being ordered: 3ml syringes and 22G 1 1/2\" needles to use with testosterone inj every 2 weeks    Testosterone deficiency       pravastatin 80 MG tablet    PRAVACHOL    90 tablet    TAKE ONE TABLET BY MOUTH EVERY NIGHT AT BEDTIME    Hyperlipidemia LDL goal <70       ranitidine 150 MG tablet    ZANTAC    180 tablet    TAKE ONE TABLET BY MOUTH TWICE A DAY    Gastroesophageal reflux disease without esophagitis       testosterone cypionate 200 MG/ML injection    DEPOTESTOTERONE    10 mL    Inject 1.5 mLs (300 mg) into the muscle every 14 days    Testosterone deficiency       VITAMIN D3 PO      Take 5,000 Units by mouth daily          "

## 2018-09-13 NOTE — LETTER
9/13/2018      Andrew Elizalde MD  [address] [facility] [address]      RE: Pascual Addisonocki       Dear Colleague,    I had the pleasure of seeing Pascual Perea in the [facility].    Service Date: 09/13/2018      HISTORY OF PRESENT ILLNESS:  I had my yearly get-together with Dayday Perea today, who is 78.  Recently this past spring, he was seen with symptomatic paroxysmal atrial fibrillation.  He was subsequently cardioverted by my associate, Dr. Jarvis.  Since that time, he has not had recurrent palpitations or tachycardia and has generally felt much better in sinus rhythm than when he was in atrial fibrillation.      He has a history of coronary artery disease, having remotely had coronary artery bypass surgery.  In the past year, he has not had anything to suggest an obvious anginal equivalent.  His ejection fractions have typically been normal, and he has had no evidence of heart failure.      CURRENT MEDICATIONS:   1.  Eliquis 5 mg b.i.d. for atrial fibrillation stroke prophylaxis.   2.  Lopid 600 mg b.i.d.   3.  Insulin per schedule.   4.  Levothyroxine 125 mcg a day.   5.  Lisinopril 30 mg a day.   6.  Metformin 1000 mg b.i.d.   7.  Metoprolol tartrate 50 mg b.i.d.   8.  Pravachol 80 mg at bedtime.   9.  Zantac 150 mg a day.   10.  Testosterone injections every 2 weeks.      PHYSICAL EXAMINATION:    VITAL SIGNS:  Dayday weighs 201 pounds, which is actually down 10 pounds from earlier this year.  Blood pressure 120/60, heart rate 70 and regular.   NECK:  He had no neck vein distention or bruits at 30 degrees.   HEART:  Regular without gallop or murmur.   LUNGS:  Clear.   ABDOMEN:  Rotund, firm, nontender without pain, mass or guarding.   EXTREMITIES:  Free of edema.  Pedal pulses +1 to +2.      Dayday is 78.  He has really done a good job over the past decade of taking great care of himself.  He is much happier in sinus rhythm than he was in atrial fibrillation.  He feels better  endurance, less breathless and he is feeling as good now as he has felt for some time.  Accordingly, I renewed his cardiac medicines as listed above.  I made no changes.  I did not order any tests and did not order any studies.  I did ask to see him in a year, and I would continue his current pattern of care.  His last lipid profile showed an LDL of 38, triglycerides 170, the triglyceride elevation going along with his diabetes despite the Lopid therapy.      I think his prognosis is good.  Should he show signs of recurrent angina or heart failure, let me know.      Thanks for the privilege.  Warm regards.  Over 35 minutes was spent doing the consult, over half the time doing education, counseling, reviewing medications, multiple diseases, medications and side effects, indications, laboratory work, imaging, EKG, previous consults, etc.      cc:   Andrew Elizalde MD    [facility]    [address] [place] [address]         HILLARY BREAUX MD, [facility]             D: 2018   T: 2018   MT: SVETLANA      Name:     JESÚS VASQUEZ   MRN:      [id number]        Account:      [id number]   :      1940           Service Date: 2018      Document: [id number]         Outpatient Encounter Prescriptions as of 2018   Medication Sig Dispense Refill     apixaban ANTICOAGULANT (ELIQUIS) 5 MG tablet Take 1 tablet (5 mg) by mouth 2 times daily 60 tablet 11     blood glucose monitoring (ONE TOUCH ULTRA) test strip Test twice daily with One Touch Ultra Blue 200 each 3     Cholecalciferol (VITAMIN D3 PO) Take 5,000 Units by mouth daily       cyanocolbalamin (VITAMIN  B-12) 1000 MCG tablet Take 5,000 mcg by mouth daily.       ferrous fumarate 65 mg, [place]. FE,-Vitamin C 125 mg (VITRON-C)  MG TABS tablet 1 tab twice a day for anemia 60 tablet 11     fluticasone (FLONASE) 50 MCG/ACT spray USE ONE TO TWO SPRAYS IN EACH NOSTRIL EVERY DAY 48 g 3     gemfibrozil (LOPID) 600 MG tablet Take 1 tablet  "(600 mg) by mouth 2 times daily 180 tablet 3     GLUCOSE 4 GM OR CHEW as directed 30 11     insulin aspart (NOVOLOG FLEXPEN) 100 UNIT/ML injection Inject 3 times daily (before meals). 2 units per carb choice (approx 5-10 units/meal) 30 mL 3     insulin detemir (LEVEMIR FLEXPEN/FLEXTOUCH) 100 UNIT/ML injection Inject 40-45 Units Subcutaneous At Bedtime 45 mL 3     insulin pen needle (NOVOFINE 30) 30G X 8 MM 1 Syringe 4 times daily 400 each 3     levothyroxine (SYNTHROID/LEVOTHROID) 125 MCG tablet TAKE ONE TABLET BY MOUTH EVERY DAY 90 tablet 3     lisinopril (PRINIVIL,ZESTRIL) 30 MG tablet Take 1 tablet (30 mg) by mouth daily 90 tablet 3     loratadine (CLARITIN) 10 MG tablet TAKE ONE TABLET BY MOUTH EVERY DAY 90 tablet 3     metFORMIN (GLUCOPHAGE) 1000 MG tablet TAKE ONE TABLET BY MOUTH TWO TIMES A DAY WITH MEALS 60 tablet 2     metoprolol tartrate (LOPRESSOR) 50 MG tablet Take 1 tablet (50 mg) by mouth 2 times daily 180 tablet 3     nitroglycerin (NITROSTAT) 0.4 MG SL tablet Place 1 tablet (0.4 mg) under the tongue every 5 minutes as needed for chest pain 25 tablet 1     ONETOUCH DELICA LANCETS 33G MISC 1 Device 3 times daily . Use to test blood sugars 3 times daily or as directed. 300 each prn     order for DME Equipment being ordered: 3ml syringes and 22G 1 1/2\" needles to use with testosterone inj every 2 weeks 30 Device 1     pravastatin (PRAVACHOL) 80 MG tablet TAKE ONE TABLET BY MOUTH EVERY NIGHT AT BEDTIME 90 tablet 3     ranitidine (ZANTAC) 150 MG tablet TAKE ONE TABLET BY MOUTH TWICE A  tablet 3     testosterone cypionate (DEPOTESTOTERONE) 200 MG/ML injection Inject 1.5 mLs (300 mg) into the muscle every 14 days 10 mL 1     [DISCONTINUED] apixaban ANTICOAGULANT (ELIQUIS) 5 MG tablet Take 1 tablet (5 mg) by mouth 2 times daily 60 tablet 11     [DISCONTINUED] gemfibrozil (LOPID) 600 MG tablet TAKE ONE TABLET BY MOUTH TWICE A  tablet 3     [DISCONTINUED] lisinopril (PRINIVIL,ZESTRIL) 30 MG tablet " Take 1 tablet (30 mg) by mouth daily 90 tablet 3     [DISCONTINUED] lisinopril (PRINIVIL/ZESTRIL) 20 MG tablet TAKE ONE TABLET BY MOUTH EVERY DAY 90 tablet 3     [DISCONTINUED] metoprolol tartrate (LOPRESSOR) 50 MG tablet TAKE ONE TABLET BY MOUTH TWICE A  tablet 3     [DISCONTINUED] pravastatin (PRAVACHOL) 80 MG tablet TAKE ONE TABLET BY MOUTH EVERY NIGHT AT BEDTIME 90 tablet 2     No facility-administered encounter medications on file as of 9/13/2018.        Again, thank you for allowing me to participate in the care of your patient.      Sincerely,    HILLARY BREAUX MD     [facility]

## 2018-09-14 NOTE — PROGRESS NOTES
Service Date: 09/13/2018      HISTORY OF PRESENT ILLNESS:  I had my yearly get-together with Dayday Perea today, who is 78.  Recently this past spring, he was seen with symptomatic paroxysmal atrial fibrillation.  He was subsequently cardioverted by my associate, Dr. Jarvis.  Since that time, he has not had recurrent palpitations or tachycardia and has generally felt much better in sinus rhythm than when he was in atrial fibrillation.      He has a history of coronary artery disease, having remotely had coronary artery bypass surgery.  In the past year, he has not had anything to suggest an obvious anginal equivalent.  His ejection fractions have typically been normal, and he has had no evidence of heart failure.      CURRENT MEDICATIONS:   1.  Eliquis 5 mg b.i.d. for atrial fibrillation stroke prophylaxis.   2.  Lopid 600 mg b.i.d.   3.  Insulin per schedule.   4.  Levothyroxine 125 mcg a day.   5.  Lisinopril 30 mg a day.   6.  Metformin 1000 mg b.i.d.   7.  Metoprolol tartrate 50 mg b.i.d.   8.  Pravachol 80 mg at bedtime.   9.  Zantac 150 mg a day.   10.  Testosterone injections every 2 weeks.      PHYSICAL EXAMINATION:    VITAL SIGNS:  Dayday weighs 201 pounds, which is actually down 10 pounds from earlier this year.  Blood pressure 120/60, heart rate 70 and regular.   NECK:  He had no neck vein distention or bruits at 30 degrees.   HEART:  Regular without gallop or murmur.   LUNGS:  Clear.   ABDOMEN:  Rotund, firm, nontender without pain, mass or guarding.   EXTREMITIES:  Free of edema.  Pedal pulses +1 to +2.      Dayday is 78.  He has really done a good job over the past decade of taking great care of himself.  He is much happier in sinus rhythm than he was in atrial fibrillation.  He feels better endurance, less breathless and he is feeling as good now as he has felt for some time.  Accordingly, I renewed his cardiac medicines as listed above.  I made no changes.  I did not order any tests and did not order any  studies.  I did ask to see him in a year, and I would continue his current pattern of care.  His last lipid profile showed an LDL of 38, triglycerides 170, the triglyceride elevation going along with his diabetes despite the Lopid therapy.      I think his prognosis is good.  Should he show signs of recurrent angina or heart failure, let me know.      Thanks for the privilege.  Warm regards.  Over 35 minutes was spent doing the consult, over half the time doing education, counseling, reviewing medications, multiple diseases, medications and side effects, indications, laboratory work, imaging, EKG, previous consults, etc.      cc:   Andrew Elizalde MD    [facility]    [address] [place] [address]         HILLARY BREAUX MD, [facility]             D: 2018   T: 2018   MT: SVETLANA      Name:     JESÚS VASQUEZ   MRN:      [id number]        Account:      [id number]   :      1940           Service Date: 2018      Document: [id number]

## 2018-09-15 ENCOUNTER — OFFICE VISIT (OUTPATIENT)
Dept: URGENT CARE | Facility: URGENT CARE | Age: 78
End: 2018-09-15
Payer: COMMERCIAL

## 2018-09-15 VITALS
TEMPERATURE: 98.4 F | SYSTOLIC BLOOD PRESSURE: 112 MMHG | DIASTOLIC BLOOD PRESSURE: 64 MMHG | RESPIRATION RATE: 20 BRPM | HEART RATE: 90 BPM | WEIGHT: 202.38 LBS | BODY MASS INDEX: 28.23 KG/M2

## 2018-09-15 DIAGNOSIS — S05.8X1A SUPERFICIAL INJURY OF RIGHT CORNEA, INITIAL ENCOUNTER: Primary | ICD-10-CM

## 2018-09-15 PROCEDURE — 99213 OFFICE O/P EST LOW 20 MIN: CPT | Performed by: INTERNAL MEDICINE

## 2018-09-15 RX ORDER — POLYMYXIN B SULFATE AND TRIMETHOPRIM 1; 10000 MG/ML; [USP'U]/ML
1 SOLUTION OPHTHALMIC
Qty: 1 BOTTLE | Refills: 0 | Status: ON HOLD | OUTPATIENT
Start: 2018-09-15 | End: 2019-02-13

## 2018-09-15 NOTE — PATIENT INSTRUCTIONS
Corneal Abrasion    You have received a scratch or scrape (abrasion) to your cornea. The cornea is the clear part in the front of the eye. This sensitive area is very painful when injured. You may make tears frequently, and your vision may be blurry until the injury heals. You may be sensitive to light.  This part of the body heals quickly. You can expect the pain to go away within 24 to 48 hours. If the abrasion is large or deep, your doctor may apply an eye patch, although this is not always done. An antibiotic ointment or eye drops may also be used to prevent infection.  Numbing drops may be used to relieve the pain temporarily so that your eyes can be examined. But these drops can t be prescribed for home use because that would prevent healing and lead to more serious problems. Also, if you can t feel your eye, there is a chance of accidentally injuring it further without knowing it.  Home care    A cold pack may be applied over the eye (or eye patch) for 20 minutes at a time, to reduce pain. To make a cold pack, put ice cubes in a plastic bag that seals at the top. Wrap the bag in a clean, thin towel or cloth.    You may use acetaminophen or ibuprofen to control pain, unless another pain medicine was prescribed. Note: If you have chronic liver or kidney disease or have ever had a stomach ulcer or GI bleeding, talk with your doctor before using these medicines.    Rest your eyes and don t read until symptoms are gone.    If you use contact lenses, don t wear them until all symptoms are gone.    If your vision is affected by the corneal abrasion or if an eye patch was applied, don t drive a motor vehicle or operate machinery until all symptoms are gone. You may have trouble judging distances using only one eye.    If your eyes are sensitive to light, try wearing sunglasses, or stay indoors until symptoms go away.  Follow-up care  Follow up with your healthcare provider, or as advised.    If no patch was put on  your eye and the pain continues for more than 48 hours, you should have another exam. Contact your healthcare provider to arrange this.    If your eye was patched and you were asked to remove the patch yourself, see your healthcare provider. Contact your healthcare provider if you still have pain after the patch is removed.    If you were given a return appointment for patch removal and re-examination, be sure to keep the appointment. Leaving the patch in place longer than advised could be harmful.  When to seek medical advice  Call your healthcare provider right away if any of these occur.    Increasing eye pain or pain that does not improve after 24 hours    Discharge from the eye    Increasing redness of the eye or swelling of the eyelids    Worsening vision    Symptoms get worse after the abrasion has healed  Date Last Reviewed: 7/1/2017 2000-2017 [employer] [address] [place] [address], [place] [address]. All rights reserved. This information is not intended as a substitute for professional medical care. Always follow your healthcare professional's instructions.

## 2018-09-15 NOTE — MR AVS SNAPSHOT
After Visit Summary   9/15/2018    Pascual Perea    MRN: [id number]           Patient Information     Date Of Birth          1940        Visit Information        Provider Department      9/15/2018 4:10 PM Omi Hardy MD [facility]        Today's Diagnoses     Superficial injury of right cornea, initial encounter    -  1      Care Instructions      Corneal Abrasion    You have received a scratch or scrape (abrasion) to your cornea. The cornea is the clear part in the front of the eye. This sensitive area is very painful when injured. You may make tears frequently, and your vision may be blurry until the injury heals. You may be sensitive to light.  This part of the body heals quickly. You can expect the pain to go away within 24 to 48 hours. If the abrasion is large or deep, your doctor may apply an eye patch, although this is not always done. An antibiotic ointment or eye drops may also be used to prevent infection.  Numbing drops may be used to relieve the pain temporarily so that your eyes can be examined. But these drops can t be prescribed for home use because that would prevent healing and lead to more serious problems. Also, if you can t feel your eye, there is a chance of accidentally injuring it further without knowing it.  Home care    A cold pack may be applied over the eye (or eye patch) for 20 minutes at a time, to reduce pain. To make a cold pack, put ice cubes in a plastic bag that seals at the top. Wrap the bag in a clean, thin towel or cloth.    You may use acetaminophen or ibuprofen to control pain, unless another pain medicine was prescribed. Note: If you have chronic liver or kidney disease or have ever had a stomach ulcer or GI bleeding, talk with your doctor before using these medicines.    Rest your eyes and don t read until symptoms are gone.    If you use contact lenses, don t wear them until all symptoms are gone.    If your vision  is affected by the corneal abrasion or if an eye patch was applied, don t drive a motor vehicle or operate machinery until all symptoms are gone. You may have trouble judging distances using only one eye.    If your eyes are sensitive to light, try wearing sunglasses, or stay indoors until symptoms go away.  Follow-up care  Follow up with your healthcare provider, or as advised.    If no patch was put on your eye and the pain continues for more than 48 hours, you should have another exam. Contact your healthcare provider to arrange this.    If your eye was patched and you were asked to remove the patch yourself, see your healthcare provider. Contact your healthcare provider if you still have pain after the patch is removed.    If you were given a return appointment for patch removal and re-examination, be sure to keep the appointment. Leaving the patch in place longer than advised could be harmful.  When to seek medical advice  Call your healthcare provider right away if any of these occur.    Increasing eye pain or pain that does not improve after 24 hours    Discharge from the eye    Increasing redness of the eye or swelling of the eyelids    Worsening vision    Symptoms get worse after the abrasion has healed  Date Last Reviewed: 7/1/2017 2000-2017 [employer]. [address] [place] [address]. All rights reserved. This information is not intended as a substitute for professional medical care. Always follow your healthcare professional's instructions.                Follow-ups after your visit        Who to contact     If you have questions or need follow up information about today's clinic visit or your schedule please contact [facility] directly at [phone].  Normal or non-critical lab and imaging results will be communicated to you by MyChart, letter or phone within 4 business days after the clinic has received the results. If you do not hear from us  within 7 days, please contact the clinic through [employer] or phone. If you have a critical or abnormal lab result, we will notify you by phone as soon as possible.  Submit refill requests through [employer] or call your pharmacy and they will forward the refill request to us. Please allow 3 business days for your refill to be completed.          Additional Information About Your Visit        [employer] Information     [employer] gives you secure access to your electronic health record. If you see a primary care provider, you can also send messages to your care team and make appointments. If you have questions, please call your primary care clinic.  If you do not have a primary care provider, please call [phone] and they will assist you.        Care EveryWhere ID     This is your Care EveryWhere ID. This could be used by other organizations to access your [place] medical records  [id number]        Your Vitals Were     Pulse Temperature Respirations BMI (Body Mass Index)          90 98.4  F (36.9  C) (Oral) 20 28.23 kg/m2         Blood Pressure from Last 3 Encounters:   09/15/18 112/64   09/13/18 124/64   05/25/18 128/71    Weight from Last 3 Encounters:   09/15/18 202 lb 6 oz (91.8 kg)   09/13/18 201 lb 12.8 oz (91.5 kg)   05/25/18 208 lb (94.3 kg)              Today, you had the following     No orders found for display         Today's Medication Changes          These changes are accurate as of 9/15/18  4:50 PM.  If you have any questions, ask your nurse or doctor.               Start taking these medicines.        Dose/Directions    trimethoprim-polymyxin b ophthalmic solution   Commonly known as:  POLYTRIM   Used for:  Superficial injury of right cornea, initial encounter   Started by:  Omi Hardy MD        Dose:  1 drop   Apply 1 drop to eye every 3 hours for 7 days   Quantity:  1 Bottle   Refills:  0            Where to get your medicines      These medications were sent to [facility]  [place], [place] - [address].  [address], [facility] [address]     Phone:  [phone]     trimethoprim-polymyxin b ophthalmic solution                Primary Care Provider Office Phone # Fax #    Andrew Elizalde [phone]       [address] [facility] [address]        Equal Access to Services     AMY FLORES : Hadii aad ku hadasho Soomaali, waaxda luqadaha, qaybta kaalmada adeegyada, waxay idiin hayaan adeeg kharash la'aan ah. So [facility] [phone].    ATENCIÓN: Si habla español, tiene a cooley disposición servicios gratuitos de asistencia lingüística. Iftikhar al [phone].    We comply with applicable federal civil rights laws and [place] laws. We do not discriminate on the basis of race, color, national origin, age, disability, sex, sexual orientation, or gender identity.            Thank you!     Thank you for choosing [facility]  for your care. Our goal is always to provide you with excellent care. Hearing back from our patients is one way we can continue to improve our services. Please take a few minutes to complete the written survey that you may receive in the mail after your visit with us. Thank you!             Your Updated Medication List - Protect others around you: Learn how to safely use, store and throw away your medicines at www.disposemymeds.org.          This list is accurate as of 9/15/18  4:50 PM.  Always use your most recent med list.                   Brand Name Dispense Instructions for use Diagnosis    apixaban ANTICOAGULANT 5 MG tablet    ELIQUIS    60 tablet    Take 1 tablet (5 mg) by mouth 2 times daily    Typical atrial flutter (H)       blood glucose monitoring test strip    ONETOUCH ULTRA    200 each    Test twice daily with One Touch Ultra Blue    Type 2 diabetes mellitus without complication, with long-term current use of insulin (H)       cyanocobalamin 1000 MCG tablet    vitamin  B-12     Take 5,000 mcg by mouth daily.         ferrous fumarate 65 mg ([place]. FE)-Vitamin C 125 mg  MG Tabs tablet    VITRON-C    60 tablet    1 tab twice a day for anemia    Iron deficiency anemia, unspecified iron deficiency anemia type       fluticasone 50 MCG/ACT spray    FLONASE    48 g    USE ONE TO TWO SPRAYS IN EACH NOSTRIL EVERY DAY    Chronic allergic rhinitis, unspecified seasonality, unspecified trigger       gemfibrozil 600 MG tablet    LOPID    180 tablet    Take 1 tablet (600 mg) by mouth 2 times daily    Hyperlipidemia LDL goal <70       glucose 4 g Chew chewable tablet     30    as directed    Type II or unspecified type diabetes mellitus without mention of complication, not stated as uncontrolled       insulin aspart 100 UNIT/ML injection    NovoLOG FLEXPEN    30 mL    Inject 3 times daily (before meals). 2 units per carb choice (approx 5-10 units/meal)    Type 2 diabetes mellitus without complication, with long-term current use of insulin (H)       insulin detemir 100 UNIT/ML injection    LEVEMIR FLEXPEN/FLEXTOUCH    45 mL    Inject 40-45 Units Subcutaneous At Bedtime    Type 2 diabetes mellitus without complication, with long-term current use of insulin (H)       insulin pen needle 30G X 8 MM    NOVOFINE 30    400 each    1 Syringe 4 times daily    Type 2 diabetes mellitus without complication, with long-term current use of insulin (H)       levothyroxine 125 MCG tablet    SYNTHROID/LEVOTHROID    90 tablet    TAKE ONE TABLET BY MOUTH EVERY DAY    Hypothyroidism, unspecified type       lisinopril 30 MG tablet    PRINIVIL,ZESTRIL    90 tablet    Take 1 tablet (30 mg) by mouth daily    Essential hypertension, benign       loratadine 10 MG tablet    CLARITIN    90 tablet    TAKE ONE TABLET BY MOUTH EVERY DAY    Chronic allergic rhinitis, unspecified seasonality, unspecified trigger       metFORMIN 1000 MG tablet    GLUCOPHAGE    60 tablet    TAKE ONE TABLET BY MOUTH TWO TIMES A DAY WITH MEALS    Type 2 diabetes mellitus without  "complication (H)       metoprolol tartrate 50 MG tablet    LOPRESSOR    180 tablet    Take 1 tablet (50 mg) by mouth 2 times daily    Essential hypertension, benign       nitroGLYcerin 0.4 MG sublingual tablet    NITROSTAT    25 tablet    Place 1 tablet (0.4 mg) under the tongue every 5 minutes as needed for chest pain    Unstable angina (H)       ONETOUCH DELICA LANCETS 33G Misc     300 each    1 Device 3 times daily . Use to test blood sugars 3 times daily or as directed.    Type 2 diabetes, HbA1C goal < 8% (H)       order for DME     30 Device    Equipment being ordered: 3ml syringes and 22G 1 1/2\" needles to use with testosterone inj every 2 weeks    Testosterone deficiency       pravastatin 80 MG tablet    PRAVACHOL    90 tablet    TAKE ONE TABLET BY MOUTH EVERY NIGHT AT BEDTIME    Hyperlipidemia LDL goal <70       ranitidine 150 MG tablet    ZANTAC    180 tablet    TAKE ONE TABLET BY MOUTH TWICE A DAY    Gastroesophageal reflux disease without esophagitis       testosterone cypionate 200 MG/ML injection    DEPOTESTOTERONE    10 mL    Inject 1.5 mLs (300 mg) into the muscle every 14 days    Testosterone deficiency       trimethoprim-polymyxin b ophthalmic solution    POLYTRIM    1 Bottle    Apply 1 drop to eye every 3 hours for 7 days    Superficial injury of right cornea, initial encounter       VITAMIN D3 PO      Take 5,000 Units by mouth daily          "

## 2018-09-15 NOTE — PROGRESS NOTES
SUBJECTIVE:  Pascual Perea, a 78 year old male, presents for evaluation of a possible foreign body in the right eye.  He was working outside earlier today when the eye started becoming irritated and watery.  Not certain if some dust or dirt got into the eye.  Not an obvious trauma.    Vision seems to be normal.      OBJECTIVE:  /64 (Cuff Size: Adult Regular)  Pulse 90  Temp 98.4  F (36.9  C) (Oral)  Resp 20  Wt 202 lb 6 oz (91.8 kg)  BMI 28.23 kg/m2  GENERAL: healthy, alert and no distress  EYES: topical anesthetic with propacaine; there is no obvious foreign body; generalized injection of the sclerae and conjunctival redness and mild edema; watery discharge; with fluorescein staining, a shallow corneal abrasion is noted    ASSESSMENT/PLAN:    ICD-10-CM    1. Superficial injury of right cornea, initial encounter S05.8X1A trimethoprim-polymyxin b (POLYTRIM) ophthalmic solution    Supportive care.       Omi Hardy MD

## 2018-11-05 DIAGNOSIS — Z79.4 TYPE 2 DIABETES MELLITUS WITHOUT COMPLICATION, WITH LONG-TERM CURRENT USE OF INSULIN (H): ICD-10-CM

## 2018-11-05 DIAGNOSIS — E11.9 TYPE 2 DIABETES MELLITUS WITHOUT COMPLICATION, WITH LONG-TERM CURRENT USE OF INSULIN (H): ICD-10-CM

## 2018-11-05 NOTE — LETTER
[facility]  [address] [id number] [phone]            Pascual Perea  [address]  [facility] [address]        November 6, 2018    Dear Pascual,    While refilling your prescription today, we noticed that you are due for an appointment with your provider.  We will refill your prescription for 30 days, but a follow-up appointment must be made before any additional refills can be approved.     Taking care of your health is important to us and we look forward to seeing you in the near future.  Please call us at [phone] or [phone] (or use [employer]) to schedule an appointment.     Please disregard this notice if you have already made an appointment.    Sincerely,        [facility]

## 2018-11-06 RX ORDER — PEN NEEDLE, DIABETIC 30 GX5/16"
NEEDLE, DISPOSABLE MISCELLANEOUS
Qty: 400 EACH | Refills: 0 | Status: SHIPPED | OUTPATIENT
Start: 2018-11-06 | End: 2019-04-08

## 2018-11-06 NOTE — TELEPHONE ENCOUNTER
"Requested Prescriptions   Pending Prescriptions Disp Refills     INSUPEN ULTRAFIN 30G X 8 MM [Pharmacy Med Name: INSUPEN ULTRAFIN 30G X 8 MM MISC]  Last Written Prescription Date:  09/23/2017  Last Fill Quantity: 400,  # refills: 03   Last Office Visit: 5/1/2018   Future Office Visit:      400 each 3     Sig: USE FOUR TIMES A DAY    Diabetic Supplies Protocol Failed    11/5/2018 10:28 AM       Failed - Recent (6 mo) or future (30 days) visit within the authorizing provider's specialty    Patient had office visit in the last 6 months or has a visit in the next 30 days with authorizing provider.  See \"Patient Info\" tab in inbasket, or \"Choose Columns\" in Meds & Orders section of the refill encounter.           Passed - Patient is 18 years of age or older          "

## 2018-11-12 DIAGNOSIS — E11.9 TYPE 2 DIABETES MELLITUS WITHOUT COMPLICATION (H): ICD-10-CM

## 2018-11-12 DIAGNOSIS — E11.9 TYPE 2 DIABETES MELLITUS WITHOUT COMPLICATION, WITH LONG-TERM CURRENT USE OF INSULIN (H): ICD-10-CM

## 2018-11-12 DIAGNOSIS — Z79.4 TYPE 2 DIABETES MELLITUS WITHOUT COMPLICATION, WITH LONG-TERM CURRENT USE OF INSULIN (H): ICD-10-CM

## 2018-11-12 NOTE — LETTER
[facility]  [address] [id number] [phone]            Pascual Perea  [address]  [facility] [address]        November 13, 2018    Dear Pascual,    While refilling your prescription today, we noticed that you are due for an appointment with your provider.  We will refill your prescription for 30 days, but a follow-up appointment must be made before any additional refills can be approved.     Taking care of your health is important to us and we look forward to seeing you in the near future.  Please call us at [phone] or [phone] (or use [employer]) to schedule an appointment.     Please disregard this notice if you have already made an appointment.    Sincerely,        [facility]

## 2018-11-13 NOTE — TELEPHONE ENCOUNTER
"Requested Prescriptions   Pending Prescriptions Disp Refills     metFORMIN (GLUCOPHAGE) 1000 MG tablet [Pharmacy Med Name: METFORMIN HCL 1000MG TABS]  Last Written Prescription Date:  08/14/2018  Last Fill Quantity: 60,  # refills: 02   Last Office Visit: 5/1/2018   Future Office Visit:      60 tablet 2     Sig: TAKE ONE TABLET BY MOUTH TWO TIMES A DAY WITH MEALS    Biguanide Agents Failed    11/12/2018 10:53 AM       Failed - Recent (6 mo) or future (30 days) visit within the authorizing provider's specialty    Patient had office visit in the last 6 months or has a visit in the next 30 days with authorizing provider or within the authorizing provider's specialty.  See \"Patient Info\" tab in inbasket, or \"Choose Columns\" in Meds & Orders section of the refill encounter.           Passed - Blood pressure less than 140/90 in past 6 months    BP Readings from Last 3 Encounters:   09/15/18 112/64   09/13/18 124/64   05/25/18 128/71                Passed - Patient has documented LDL within the past 12 mos.    Recent Labs   Lab Test  08/13/18   0849   LDL  38            Passed - Patient has had a Microalbumin in the past 15 mos.    Recent Labs   Lab Test  08/13/18   0900   MICROL  18   UMALCR  11.06            Passed - Patient is age 10 or older       Passed - Patient has documented A1c within the specified period of time.    If HgbA1C is 8 or greater, it needs to be on file within the past 3 months.  If less than 8, must be on file within the past 6 months.     Recent Labs   Lab Test  08/13/18   0849   A1C  7.2*            Passed - Patient's CR is NOT>1.4 OR Patient's EGFR is NOT<45 within past 12 mos.    Recent Labs   Lab Test  08/13/18   0849   GFRESTIMATED  75   GFRESTBLACK  >90       Recent Labs   Lab Test  08/13/18   0849   CR  0.97            Passed - Patient does NOT have a diagnosis of CHF.          "

## 2018-11-13 NOTE — TELEPHONE ENCOUNTER
"Requested Prescriptions   Pending Prescriptions Disp Refills     insulin aspart (NOVOLOG FLEXPEN) 100 UNIT/ML injection  Last Written Prescription Date:  01/29/2018  Last Fill Quantity: 45mL,  # refills: 03   Last Office Visit: 5/1/2018   Future Office Visit:      30 mL 3     Sig: Inject 3 times daily (before meals). 2 units per carb choice (approx 5-10 units/meal)    Short Acting Insulin Protocol Failed    11/12/2018 12:10 PM       Failed - Recent (6 mo) or future (30 days) visit within the authorizing provider's specialty    Patient had office visit in the last 6 months or has a visit in the next 30 days with authorizing provider or within the authorizing provider's specialty.  See \"Patient Info\" tab in inbasket, or \"Choose Columns\" in Meds & Orders section of the refill encounter.           Passed - Blood pressure less than 140/90 in past 6 months    BP Readings from Last 3 Encounters:   09/15/18 112/64   09/13/18 124/64   05/25/18 128/71                Passed - LDL on file in past 12 months    Recent Labs   Lab Test  08/13/18   0849   LDL  38            Passed - Microalbumin on file in past 12 months    Recent Labs   Lab Test  08/13/18   0900   MICROL  18   UMALCR  11.06            Passed - Serum creatinine on file in past 12 months    Recent Labs   Lab Test  08/13/18   0849   03/30/12 2012   CR  0.97   < >   --    CREAT   --    --   1.7*    < > = values in this interval not displayed.            Passed - HgbA1C in past 3 or 6 months    If HgbA1C is 8 or greater, it needs to be on file within the past 3 months.  If less than 8, must be on file within the past 6 months.     Recent Labs   Lab Test  08/13/18   0849   A1C  7.2*            Passed - Patient is age 18 or older          "

## 2018-11-13 NOTE — TELEPHONE ENCOUNTER
Medication is being filled for 1 time refill only due to:  due for an appointment.  Letter sent.

## 2018-12-04 ENCOUNTER — OFFICE VISIT (OUTPATIENT)
Dept: INTERNAL MEDICINE | Facility: CLINIC | Age: 78
End: 2018-12-04
Payer: COMMERCIAL

## 2018-12-04 VITALS
DIASTOLIC BLOOD PRESSURE: 64 MMHG | BODY MASS INDEX: 27.89 KG/M2 | SYSTOLIC BLOOD PRESSURE: 116 MMHG | TEMPERATURE: 97.8 F | HEART RATE: 66 BPM | RESPIRATION RATE: 16 BRPM | OXYGEN SATURATION: 98 % | WEIGHT: 200 LBS

## 2018-12-04 DIAGNOSIS — E78.5 HYPERLIPIDEMIA LDL GOAL <70: Primary | ICD-10-CM

## 2018-12-04 DIAGNOSIS — Z79.4 TYPE 2 DIABETES MELLITUS WITHOUT COMPLICATION, WITH LONG-TERM CURRENT USE OF INSULIN (H): ICD-10-CM

## 2018-12-04 DIAGNOSIS — E34.9 TESTOSTERONE DEFICIENCY: ICD-10-CM

## 2018-12-04 DIAGNOSIS — Z23 NEED FOR PROPHYLACTIC VACCINATION AND INOCULATION AGAINST INFLUENZA: ICD-10-CM

## 2018-12-04 DIAGNOSIS — E11.9 TYPE 2 DIABETES MELLITUS WITHOUT COMPLICATION, WITH LONG-TERM CURRENT USE OF INSULIN (H): ICD-10-CM

## 2018-12-04 DIAGNOSIS — E03.9 HYPOTHYROIDISM, UNSPECIFIED TYPE: ICD-10-CM

## 2018-12-04 LAB — HBA1C MFR BLD: 6.8 % (ref 0–5.6)

## 2018-12-04 PROCEDURE — 99207 C FOOT EXAM  NO CHARGE: CPT | Performed by: INTERNAL MEDICINE

## 2018-12-04 PROCEDURE — 83036 HEMOGLOBIN GLYCOSYLATED A1C: CPT | Performed by: INTERNAL MEDICINE

## 2018-12-04 PROCEDURE — 90662 IIV NO PRSV INCREASED AG IM: CPT | Performed by: INTERNAL MEDICINE

## 2018-12-04 PROCEDURE — G0008 ADMIN INFLUENZA VIRUS VAC: HCPCS | Performed by: INTERNAL MEDICINE

## 2018-12-04 PROCEDURE — 36415 COLL VENOUS BLD VENIPUNCTURE: CPT | Performed by: INTERNAL MEDICINE

## 2018-12-04 PROCEDURE — 99214 OFFICE O/P EST MOD 30 MIN: CPT | Mod: 25 | Performed by: INTERNAL MEDICINE

## 2018-12-04 NOTE — PROGRESS NOTES
SUBJECTIVE:   Pascual Perea is a 78 year old male who presents to clinic today for the following health issues:    Chief Complaint   Patient presents with     Diabetes     Hypertension     Thyroid Problem       Diabetes Follow-up      Patient is checking blood sugars: rarely.  Results range in the 130's     Diabetic concerns: None     Symptoms of hypoglycemia (low blood sugar): none     Paresthesias (numbness or burning in feet) or sores: Yes Numbness, Pain and Burning     Date of last diabetic eye exam: No Recent Eye Exam on File    BP Readings from Last 2 Encounters:   09/15/18 112/64   09/13/18 124/64     Hemoglobin A1C (%)   Date Value   08/13/2018 7.2 (H)   04/16/2018 6.9 (H)     LDL Cholesterol Calculated (mg/dL)   Date Value   08/13/2018 38   09/13/2017 31       Diabetes Management Resources  Hypertension Follow-up      Outpatient blood pressures are not being checked.    Low Salt Diet: low salt    Hypothyroidism Follow-up      Since last visit, patient describes the following symptoms: Weight stable, no hair loss, no skin changes, no constipation, no loose stools      Amount of exercise or physical activity: 2-3 days/week for an average of 45-60 minutes    Problems taking medications regularly: No    Medication side effects: none    Diet: low salt    Lab Results   Component Value Date    GLC 97 08/13/2018     Lab Results   Component Value Date    A1C 7.2 08/13/2018     Lab Results   Component Value Date    CHOL 105 08/13/2018     Lab Results   Component Value Date    LDL 38 08/13/2018     Lab Results   Component Value Date    HDL 32 08/13/2018     Lab Results   Component Value Date    TRIG 175 08/13/2018     Lab Results   Component Value Date    CR 0.97 08/13/2018     Lab Results   Component Value Date    ALT 21 08/13/2018     Lab Results   Component Value Date    AST 18 08/13/2018     Lab Results   Component Value Date    MICROL 18 08/13/2018     Lab Results   Component Value Date    TSH 0.99  "08/13/2018       Denies CP, SOB, abdominal pain, polyuria, polydipsia, vision changes, extremity numbness/parasthesias or skin problems.  On testosterone  Injections.  Level along with PSA lab  from Aug reviewed and at goal  Due for eye exam    Pt's past medical history, family history, habits, medications and allergies were reviewed with the patient today.  See snap shot for  HCM status. Most recent lab results reviewed with pt. Problem list and histories reviewed & adjusted, as indicated.  Additional history as below:       Additional ROS:   Constitutional, HEENT, Cardiovascular, Pulmonary, GI and , Neuro, MSK and Psych review of systems/symptoms are otherwise negative or unchanged from previous, except as noted above.      OBJECTIVE:  /64  Pulse 66  Temp 97.8  F (36.6  C) (Oral)  Resp 16  Wt 200 lb (90.7 kg)  SpO2 98%  BMI 27.89 kg/m2   Estimated body mass index is 27.89 kg/(m^2) as calculated from the following:    Height as of 9/13/18: 5' 11\" (1.803 m).    Weight as of this encounter: 200 lb (90.7 kg).    Neck: no adenopathy. Thyroid normal to palpation. No bruits  Pulm: Lungs clear to auscultation   CV: Regular rates and rhythm  GI: Soft, nontender, Normal active bowel sounds, No hepatosplenomegaly or masses palpable  Ext: Peripheral pulses intact. No edema. Normal DM foot exam  Neuro: Normal strength and tone, sensory exam grossly normal    Assessment/Plan: (See plan discussion below for further details)  1. Type 2 diabetes mellitus without complication, with long-term current use of insulin (H)  A1C up mildly from previous but was at goal for age. Will recheck today since not checking sugars often  - FOOT EXAM  NO CHARGE [92949.114]  - metFORMIN (GLUCOPHAGE) 1000 MG tablet; TAKE ONE TABLET BY MOUTH TWO TIMES A DAY WITH MEALS  Dispense: 180 tablet; Refill: 3  - insulin detemir (LEVEMIR FLEXPEN/FLEXTOUCH) 100 UNIT/ML pen; Inject 35-40 Units Subcutaneous At Bedtime  Dispense: 45 mL; Refill: 3  - " Hemoglobin A1c  - Comprehensive metabolic panel; Future  - Albumin Random Urine Quantitative with Creat Ratio; Future    2. Testosterone deficiency  Level at goal. Will continue med and recheck 3 mos  - Testosterone Free and Total; Future  - Hepatic panel; Future  - Hemoglobin; Future    3. Need for prophylactic vaccination and inoculation against influenza  - FLU VACCINE, INCREASED ANTIGEN, PRESV FREE, AGE 65+ [78931]  - ADMIN INFLUENZA (For MEDICARE Patients ONLY) []    4. Hyperlipidemia LDL goal <70  Controlled. Continue med and repeat labs 6 mos  - Comprehensive metabolic panel; Future  - Lipid panel reflex to direct LDL Fasting; Future    5. Hypothyroidism, unspecified type  Controlled. Continue med and repeat labs 6 mos  - TSH with free T4 reflex; Future      Plan discussion:  Eye exam  Continue current meds  Prescriptions refilled.    Labs today as ordered  Nonfasting testosterone lab in early March 2019. Time so that done just before testosterone injection due  Flu vaccine   Check with insurance or speak with your pharmacist re: Shingrix vaccine coverage for shingles prevention.  This is a 2 shot series done 2-6 months apart  See me in 6 months for follow-up or earlier as needed. Fasting labs a few days prior to appointment with me         Andrew Elizalde MD  Internal Medicine Department  [facility]            Injectable Influenza Immunization Documentation    1.  Is the person to be vaccinated sick today?   No    2. Does the person to be vaccinated have an allergy to a component   of the vaccine?   No  Egg Allergy Algorithm Link    3. Has the person to be vaccinated ever had a serious reaction   to influenza vaccine in the past?   No    4. Has the person to be vaccinated ever had Guillain-Barré syndrome?   No    Form completed by Ananya Navas [facility]

## 2018-12-04 NOTE — PATIENT INSTRUCTIONS
Eye exam  Continue current meds  Prescriptions refilled.    Labs today as ordered  Nonfasting testosterone lab in early March 2019. Time so that done just before testosterone injection due  Flu vaccine   Check with insurance or speak with your pharmacist re: Shingrix vaccine coverage for shingles prevention.  This is a 2 shot series done 2-6 months apart  See me in 6 months for follow-up or earlier as needed. Fasting labs a few days prior to appointment with me

## 2018-12-04 NOTE — MR AVS SNAPSHOT
After Visit Summary   12/4/2018    Pascual Perea    MRN: [id number]           Patient Information     Date Of Birth          1940        Visit Information        Provider Department      12/4/2018 10:00 AM Andrew Elizalde MD [facility]        Today's Diagnoses     Need for prophylactic vaccination and inoculation against influenza    -  1    Screening for diabetic retinopathy        Screening for diabetic peripheral neuropathy        Type 2 diabetes mellitus without complication, with long-term current use of insulin (H)        Testosterone deficiency          Care Instructions    Eye exam  Continue current meds  Prescriptions refilled.    Labs today as ordered  Nonfasting testosterone lab in early March 2019. Time so that done just before testosterone injection due  Flu vaccine   Check with insurance or speak with your pharmacist re: Shingrix vaccine coverage for shingles prevention.  This is a 2 shot series done 2-6 months apart  See me in 6 months for follow-up or earlier as needed                          Follow-ups after your visit        Future tests that were ordered for you today     Open Future Orders        Priority Expected Expires Ordered    Testosterone Free and Total Routine 3/4/2019 12/4/2019 12/4/2018    Hepatic panel Routine 3/4/2019 12/4/2019 12/4/2018    Hemoglobin Routine 3/4/2019 12/4/2019 12/4/2018            Who to contact     If you have questions or need follow up information about today's clinic visit or your schedule please contact [facility] directly at [phone].  Normal or non-critical lab and imaging results will be communicated to you by MyChart, letter or phone within 4 business days after the clinic has received the results. If you do not hear from us within 7 days, please contact the clinic through MyChart or phone. If you have a critical or abnormal lab result, we will notify you by phone as soon as  possible.  Submit refill requests through [employer] or call your pharmacy and they will forward the refill request to us. Please allow 3 business days for your refill to be completed.          Additional Information About Your Visit        [employer] Social Information     [employer] gives you secure access to your electronic health record. If you see a primary care provider, you can also send messages to your care team and make appointments. If you have questions, please call your primary care clinic.  If you do not have a primary care provider, please call [phone] and they will assist you.        Care EveryWhere ID     This is your Care EveryWhere ID. This could be used by other organizations to access your [facility] medical records  [id number]        Your Vitals Were     Pulse Temperature Respirations Pulse Oximetry BMI (Body Mass Index)       66 97.8  F (36.6  C) (Oral) 16 98% 27.89 kg/m2        Blood Pressure from Last 3 Encounters:   12/04/18 116/64   09/15/18 112/64   09/13/18 124/64    Weight from Last 3 Encounters:   12/04/18 200 lb (90.7 kg)   09/15/18 202 lb 6 oz (91.8 kg)   09/13/18 201 lb 12.8 oz (91.5 kg)              We Performed the Following     ADMIN INFLUENZA (For MEDICARE Patients ONLY) []     FLU VACCINE, INCREASED ANTIGEN, PRESV FREE, AGE 65+ [94961]     FOOT EXAM  NO CHARGE [33449.114]     Hemoglobin A1c          Today's Medication Changes          These changes are accurate as of 12/4/18 10:52 AM.  If you have any questions, ask your nurse or doctor.               These medicines have changed or have updated prescriptions.        Dose/Directions    insulin detemir 100 UNIT/ML pen   Commonly known as:  LEVEMIR FLEXPEN/FLEXTOUCH   This may have changed:  additional instructions   Used for:  Type 2 diabetes mellitus without complication, with long-term current use of insulin (H)   Changed by:  Andrew Elizalde MD        Inject 35-40 Units Subcutaneous At Bedtime   Quantity:  45 mL   Refills:  3             Where to get your medicines      These medications were sent to [facility], [place] - [address]  [address] [facility] [address]     Phone:  [phone]     insulin detemir 100 UNIT/ML pen    metFORMIN 1000 MG tablet                Primary Care Provider Office Phone # Fax #    Andrew Elizalde [phone]       [address]  [facility] [address]        Equal Access to Services     AMY FLORES : Hadii aad ku hadasho Soomaali, waaxda luqadaha, qaybta kaalmada adeegyada, waxay idiin hayaan adeeg kharash la'mandin ah. [facility] [phone].    ATENCIÓN: Si habla español, tiene a cooley disposición servicios gratuitos de asistencia lingüística. [facility] [phone].    We comply with applicable federal civil rights laws and [place] laws. We do not discriminate on the basis of race, color, national origin, age, disability, sex, sexual orientation, or gender identity.            Thank you!     Thank you for choosing [facility]  for your care. Our goal is always to provide you with excellent care. Hearing back from our patients is one way we can continue to improve our services. Please take a few minutes to complete the written survey that you may receive in the mail after your visit with us. Thank you!             Your Updated Medication List - Protect others around you: Learn how to safely use, store and throw away your medicines at www.disposemymeds.org.          This list is accurate as of 12/4/18 10:52 AM.  Always use your most recent med list.                   Brand Name Dispense Instructions for use Diagnosis    apixaban ANTICOAGULANT 5 MG tablet    ELIQUIS    60 tablet    Take 1 tablet (5 mg) by mouth 2 times daily    Typical atrial flutter (H)       blood glucose monitoring test strip    ONETOUCH ULTRA    200 each    Test twice daily with One Touch Ultra Blue    Type 2 diabetes mellitus without complication, with long-term current use  of insulin (H)       ferrous fumarate 65 mg ([place]. FE)-Vitamin C 125 mg  MG Tabs tablet    VITRON-C    60 tablet    1 tab twice a day for anemia    Iron deficiency anemia, unspecified iron deficiency anemia type       fluticasone 50 MCG/ACT nasal spray    FLONASE    48 g    USE ONE TO TWO SPRAYS IN EACH NOSTRIL EVERY DAY    Chronic allergic rhinitis, unspecified seasonality, unspecified trigger       gemfibrozil 600 MG tablet    LOPID    180 tablet    Take 1 tablet (600 mg) by mouth 2 times daily    Hyperlipidemia LDL goal <70       glucose 4 g chewable tablet    BD GLUCOSE    30    as directed    Type II or unspecified type diabetes mellitus without mention of complication, not stated as uncontrolled       insulin aspart 100 UNIT/ML pen    NovoLOG FLEXPEN    30 mL    Inject 3 times daily (before meals). 2 units per carb choice (approx 5-10 units/meal)    Type 2 diabetes mellitus without complication, with long-term current use of insulin (H)       insulin detemir 100 UNIT/ML pen    LEVEMIR FLEXPEN/FLEXTOUCH    45 mL    Inject 35-40 Units Subcutaneous At Bedtime    Type 2 diabetes mellitus without complication, with long-term current use of insulin (H)       INSUPEN ULTRAFIN 30G X 8 MM miscellaneous   Generic drug:  insulin pen needle     400 each    USE FOUR TIMES A DAY    Type 2 diabetes mellitus without complication, with long-term current use of insulin (H)       levothyroxine 125 MCG tablet    SYNTHROID/LEVOTHROID    90 tablet    TAKE ONE TABLET BY MOUTH EVERY DAY    Hypothyroidism, unspecified type       lisinopril 30 MG tablet    PRINIVIL/ZESTRIL    90 tablet    Take 1 tablet (30 mg) by mouth daily    Essential hypertension, benign       loratadine 10 MG tablet    CLARITIN    90 tablet    TAKE ONE TABLET BY MOUTH EVERY DAY    Chronic allergic rhinitis, unspecified seasonality, unspecified trigger       metFORMIN 1000 MG tablet    GLUCOPHAGE    180 tablet    TAKE ONE TABLET BY MOUTH TWO TIMES A DAY WITH  "MEALS    Type 2 diabetes mellitus without complication, with long-term current use of insulin (H)       metoprolol tartrate 50 MG tablet    LOPRESSOR    180 tablet    Take 1 tablet (50 mg) by mouth 2 times daily    Essential hypertension, benign       nitroGLYcerin 0.4 MG sublingual tablet    NITROSTAT    25 tablet    Place 1 tablet (0.4 mg) under the tongue every 5 minutes as needed for chest pain    Unstable angina (H)       ONETOUCH DELICA LANCETS 33G Misc     300 each    1 Device 3 times daily . Use to test blood sugars 3 times daily or as directed.    Type 2 diabetes, HbA1C goal < 8% (H)       order for DME     30 Device    Equipment being ordered: 3ml syringes and 22G 1 1/2\" needles to use with testosterone inj every 2 weeks    Testosterone deficiency       pravastatin 80 MG tablet    PRAVACHOL    90 tablet    TAKE ONE TABLET BY MOUTH EVERY NIGHT AT BEDTIME    Hyperlipidemia LDL goal <70       ranitidine 150 MG tablet    ZANTAC    180 tablet    TAKE ONE TABLET BY MOUTH TWICE A DAY    Gastroesophageal reflux disease without esophagitis       testosterone cypionate 200 MG/ML injection    DEPOTESTOSTERONE    10 mL    Inject 1.5 mLs (300 mg) into the muscle every 14 days    Testosterone deficiency       vitamin B-12 1000 MCG tablet    CYANOCOBALAMIN     Take 5,000 mcg by mouth daily.        VITAMIN D3 PO      Take 5,000 Units by mouth daily          "

## 2018-12-22 ENCOUNTER — MYC MEDICAL ADVICE (OUTPATIENT)
Dept: INTERNAL MEDICINE | Facility: CLINIC | Age: 78
End: 2018-12-22

## 2018-12-22 DIAGNOSIS — Z79.4 TYPE 2 DIABETES MELLITUS WITHOUT COMPLICATION, WITH LONG-TERM CURRENT USE OF INSULIN (H): Primary | ICD-10-CM

## 2018-12-22 DIAGNOSIS — E11.9 TYPE 2 DIABETES MELLITUS WITHOUT COMPLICATION, WITH LONG-TERM CURRENT USE OF INSULIN (H): Primary | ICD-10-CM

## 2019-01-21 ENCOUNTER — TRANSFERRED RECORDS (OUTPATIENT)
Dept: HEALTH INFORMATION MANAGEMENT | Facility: CLINIC | Age: 79
End: 2019-01-21

## 2019-01-30 ENCOUNTER — TELEPHONE (OUTPATIENT)
Dept: CARDIOLOGY | Facility: CLINIC | Age: 79
End: 2019-01-30

## 2019-01-30 DIAGNOSIS — I48.0 PAROXYSMAL ATRIAL FIBRILLATION (H): Primary | ICD-10-CM

## 2019-01-30 DIAGNOSIS — I48.0 PAROXYSMAL ATRIAL FIBRILLATION (H): ICD-10-CM

## 2019-01-30 PROCEDURE — 93000 ELECTROCARDIOGRAM COMPLETE: CPT | Performed by: INTERNAL MEDICINE

## 2019-01-31 ENCOUNTER — OFFICE VISIT (OUTPATIENT)
Dept: CARDIOLOGY | Facility: CLINIC | Age: 79
End: 2019-01-31
Payer: COMMERCIAL

## 2019-01-31 ENCOUNTER — TELEPHONE (OUTPATIENT)
Dept: INTERNAL MEDICINE | Facility: CLINIC | Age: 79
End: 2019-01-31

## 2019-01-31 VITALS
BODY MASS INDEX: 29.4 KG/M2 | HEART RATE: 62 BPM | SYSTOLIC BLOOD PRESSURE: 161 MMHG | HEIGHT: 71 IN | WEIGHT: 210 LBS | DIASTOLIC BLOOD PRESSURE: 93 MMHG

## 2019-01-31 DIAGNOSIS — J30.9 ALLERGIC RHINITIS, UNSPECIFIED SEASONALITY, UNSPECIFIED TRIGGER: Primary | ICD-10-CM

## 2019-01-31 DIAGNOSIS — I48.91 ATRIAL FIBRILLATION, UNSPECIFIED TYPE (H): Primary | ICD-10-CM

## 2019-01-31 PROCEDURE — 99215 OFFICE O/P EST HI 40 MIN: CPT | Performed by: INTERNAL MEDICINE

## 2019-01-31 RX ORDER — AMIODARONE HYDROCHLORIDE 200 MG/1
200 TABLET ORAL EVERY MORNING
Qty: 90 TABLET | Refills: 3 | Status: SHIPPED | OUTPATIENT
Start: 2019-01-31 | End: 2019-05-13

## 2019-01-31 ASSESSMENT — MIFFLIN-ST. JEOR: SCORE: 1699.42

## 2019-01-31 NOTE — LETTER
1/31/2019    Andrew Elizalde MD  [address] [facility] [address]    RE: Pascual Esteswrocki       Dear Colleague,    I had the pleasure of seeing Pascual Perea in the [facility].    HPI and Plan:   Thank you for allowing me to participate in the care of this very delightful patient.  As you know, Pascual is a 78-year-old gentleman with history of stable CAD, status post coronary bypass more than 20 years ago when being seen for symptomatic persistent atrial fibrillation with controlled ventricular response.  The patient underwent a few cardioversions with last being almost a year ago with improvement of symptoms of mostly fatigue and short of breath while he was in atrial fibrillation.  I have suggested in the past that patient should benefit from rhythm control strategy with an antiarrhythmic drug.    Since the last cardioversion the patient has been doing quite well until this past December when he felt the fatigue and not just feeling well in general.  EKG obtained from today show either atrial fibrillation or atypical atrial flutter with a very regular ventricular response of 50 bpm.  When in sinus rhythm his average rate is about 70 bpm.    This is not a surprise finding given the natural precautions atrial fibrillation.  Patient would benefit from rhythm control strategy and to enhance the success rate I would have him on amiodarone at 200 mg in the morning and 400 mg at night for the next 2 weeks and then reduced down to 200 mg a day.  I would like to stop his metoprolol as bradycardia could be worsened by adding amiodarone.  Patient has been on Eliquis without missing a dose over this past month at least.  I emphasized the importance of not missing a single dose of Eliquis prior to the cardioversion.  I went over the procedure in detail including but not limited to respiratory distress and CVA.  I have the patient come back to see 1 of our advanced practice providers in  about a month for follow-up.  Should the patient have recurrence of atrial fibrillation despite being on amiodarone we may consider A. fib ablation versus AV node ablation and pacemaker implantation which may not be as beneficial as his symptoms are from mostly the loss of AV synchrony rather than rapid ventricular response.  Orders Placed This Encounter   Procedures     Follow-Up with Cardiac Advanced Practice Provider     Cardioversion       Orders Placed This Encounter   Medications     amiodarone (PACERONE/CODARONE) 200 MG tablet     Sig: Take 1 tablet (200 mg) by mouth every morning And 2 tablets (400 mg) every evening for 2 weeks then 1 tablet every morning     Dispense:  90 tablet     Refill:  3       Medications Discontinued During This Encounter   Medication Reason     metoprolol tartrate (LOPRESSOR) 50 MG tablet          Encounter Diagnosis   Name Primary?     Atrial fibrillation, unspecified type (H) Yes       CURRENT MEDICATIONS:  Current Outpatient Medications   Medication Sig Dispense Refill     amiodarone (PACERONE/CODARONE) 200 MG tablet Take 1 tablet (200 mg) by mouth every morning And 2 tablets (400 mg) every evening for 2 weeks then 1 tablet every morning 90 tablet 3     apixaban ANTICOAGULANT (ELIQUIS) 5 MG tablet Take 1 tablet (5 mg) by mouth 2 times daily 60 tablet 11     Cholecalciferol (VITAMIN D3 PO) Take 5,000 Units by mouth daily       cyanocolbalamin (VITAMIN  B-12) 1000 MCG tablet Take 5,000 mcg by mouth daily.       ferrous fumarate 65 mg, [place]. FE,-Vitamin C 125 mg (VITRON-C)  MG TABS tablet 1 tab twice a day for anemia 60 tablet 11     fluticasone (FLONASE) 50 MCG/ACT spray USE ONE TO TWO SPRAYS IN EACH NOSTRIL EVERY DAY 48 g 3     gemfibrozil (LOPID) 600 MG tablet Take 1 tablet (600 mg) by mouth 2 times daily 180 tablet 3     insulin aspart (NOVOLOG FLEXPEN) 100 UNIT/ML injection Inject 3 times daily (before meals). 2 units per carb choice (approx 5-10 units/meal) 30 mL 0      "insulin detemir (LEVEMIR FLEXPEN/FLEXTOUCH) 100 UNIT/ML pen Inject 35-40 Units Subcutaneous At Bedtime 45 mL 3     levothyroxine (SYNTHROID/LEVOTHROID) 125 MCG tablet TAKE ONE TABLET BY MOUTH EVERY DAY 90 tablet 3     lisinopril (PRINIVIL,ZESTRIL) 30 MG tablet Take 1 tablet (30 mg) by mouth daily 90 tablet 3     loratadine (CLARITIN) 10 MG tablet TAKE ONE TABLET BY MOUTH EVERY DAY 90 tablet 3     metFORMIN (GLUCOPHAGE) 1000 MG tablet TAKE ONE TABLET BY MOUTH TWO TIMES A DAY WITH MEALS 180 tablet 3     pravastatin (PRAVACHOL) 80 MG tablet TAKE ONE TABLET BY MOUTH EVERY NIGHT AT BEDTIME 90 tablet 3     ranitidine (ZANTAC) 150 MG tablet TAKE ONE TABLET BY MOUTH TWICE A  tablet 3     testosterone cypionate (DEPOTESTOTERONE) 200 MG/ML injection Inject 1.5 mLs (300 mg) into the muscle every 14 days 10 mL 1     blood glucose monitoring (ONE TOUCH ULTRA) test strip Test twice daily with One Touch Ultra Blue 200 each 3     GLUCOSE 4 GM OR CHEW as directed 30 11     INSUPEN ULTRAFIN 30G X 8 MM USE FOUR TIMES A  each 0     nitroglycerin (NITROSTAT) 0.4 MG SL tablet Place 1 tablet (0.4 mg) under the tongue every 5 minutes as needed for chest pain 25 tablet 1     ONETOUCH DELICA LANCETS 33G MISC 1 Device 3 times daily . Use to test blood sugars 3 times daily or as directed. 300 each prn     order for DME Equipment being ordered: 3ml syringes and 22G 1 1/2\" needles to use with testosterone inj every 2 weeks 30 Device 1       ALLERGIES     Allergies   Allergen Reactions     Omeprazole      diarrhea       PAST MEDICAL HISTORY:  Past Medical History:   Diagnosis Date     Adhesive capsulitis of shoulder      Anemia 3/10/2014     Anemia, unspecified      Antiplatelet or antithrombotic long-term use      Arrhythmia     Atrial fib/flutter     CAD (coronary artery disease)      History of cardioversion 04/24/2018    a single synchronized shock of 120 joules restored normal sinus rhythm     Hyperlipidemia LDL goal <70 " 5/26/2011     Hypertension      Hypothyroidism      Impotence of organic origin      Laceration of finger  June 2010     left thumb s/p sutures     Numbness and tingling     diabetic neuropathy bilateral feet     BRANDON (obstructive sleep apnea)     intolerant of CPAP, uses it occasionally.  Using mandibular device occasionally     Osteopenia      Pulmonary hypertension (H) 4/6/2012     Sensorineural hearing loss, unspecified      Stented coronary artery 1997    CABG      Testosterone deficiency      Type 2 diabetes mellitus without complication  (goal A1C<8) 10/24/2015     Typical atrial flutter (H) 4/18/16     Unspecified hypothyroidism      Vitamin D deficiency 9/3/2011       PAST SURGICAL HISTORY:  Past Surgical History:   Procedure Laterality Date     C NONSPECIFIC PROCEDURE  1995    CABG ([facility])     C NONSPECIFIC PROCEDURE  8/01    stress echo     CARDIAC SURGERY      bypass in 1997     CARDIOVERSION  04/24/2018     COLONOSCOPY       CORONARY ANGIOGRAPHY ADULT ORDER      4/2/2012     CORONARY ARTERY BYPASS  1997    [facility]     EXCISE MASS HEAD Left 8/18/2016    Procedure: EXCISE MASS HEAD;  Surgeon: Ivan Hernandez MD;  Location: [facility]     HEART CATH, ANGIOPLASTY  4/2012     PHACOEMULSIFICATION CLEAR CORNEA WITH STANDARD INTRAOCULAR LENS IMPLANT Left 6/8/2015    Procedure: PHACOEMULSIFICATION CLEAR CORNEA WITH STANDARD INTRAOCULAR LENS IMPLANT;  Surgeon: Nixon Farnsworth MD;  Location:  [facility]     PHACOEMULSIFICATION CLEAR CORNEA WITH STANDARD INTRAOCULAR LENS IMPLANT Right 6/22/2015    Procedure: PHACOEMULSIFICATION CLEAR CORNEA WITH STANDARD INTRAOCULAR LENS IMPLANT;  Surgeon: Nixon Farnsworth MD;  Location: [facility]     SEPTOPLASTY, TURBINOPLASTY, COMBINED Bilateral 8/18/2016    Procedure: COMBINED SEPTOPLASTY, TURBINOPLASTY;  Surgeon: Ivan Hernandez MD;  Location: [facility]       FAMILY HISTORY:  Family History   Problem Relation Age of Onset     Genitourinary Problems  Father         d: age 89; ARF     Hypertension Father      Diabetes Mother         d: age 68, CAD     Heart Disease Mother         MI     Myocardial Infarction Mother      Cardiovascular Brother         d:  age 31; CAD     Heart Disease Brother         age 31, MI     Diabetes Sister         b:  1939; DM2       SOCIAL HISTORY:  Social History     Socioeconomic History     Marital status:      Spouse name: None     Number of children: None     Years of education: None     Highest education level: None   Social Needs     Financial resource strain: None     Food insecurity - worry: None     Food insecurity - inability: None     Transportation needs - medical: None     Transportation needs - non-medical: None   Occupational History     None   Tobacco Use     Smoking status: Former Smoker     Packs/day: 1.00     Years: 6.00     Pack years: 6.00     Last attempt to quit: 1964     Years since quittin.6     Smokeless tobacco: Never Used     Tobacco comment: quit    Substance and Sexual Activity     Alcohol use: No     Drug use: No     Sexual activity: Yes     Partners: Female   Other Topics Concern     Parent/sibling w/ CABG, MI or angioplasty before 65F 55M? Not Asked      Service Not Asked     Blood Transfusions Not Asked     Caffeine Concern Yes     Comment: 2 cups coffee a day     Occupational Exposure Not Asked     Hobby Hazards Not Asked     Sleep Concern Yes     Comment: sleep apnea, wears cpap off and on     Stress Concern No     Weight Concern No     Special Diet Yes     Comment: diabetic diet      Back Care Not Asked     Exercise No     Comment: occ walk the mall     Bike Helmet Not Asked     Seat Belt Yes     Self-Exams Not Asked   Social History Narrative     None       Review of Systems:  Skin:  Negative       Eyes:  Positive for glasses reading  ENT:  Positive for nasal congestion chronic  Respiratory:  Positive for sleep apnea;dyspnea on exertion;shortness of breath    "  Cardiovascular:    fatigue;Positive for    Gastroenterology: Positive for heartburn treated  Genitourinary:  Negative      Musculoskeletal:  Positive for joint pain right thumb  Neurologic:  Positive for numbness or tingling of feet    Psychiatric:  Negative      Heme/Lymph/Imm:  Negative      Endocrine:  Positive for diabetes;thyroid disorder;night sweats      Physical Exam:  Vitals: BP (!) 161/93   Pulse 62   Ht 1.803 m (5' 10.98\")   Wt 95.3 kg (210 lb)   BMI 29.30 kg/m       Constitutional:  cooperative, alert and oriented, well developed, well nourished, in no acute distress        Skin:  warm and dry to the touch, no apparent skin lesions or masses noted          Head:  normocephalic, no masses or lesions        Eyes:  pupils equal and round, conjunctivae and lids unremarkable, sclera white, no xanthalasma, EOMS intact, no nystagmus        Lymph:No Cervical lymphadenopathy present     ENT:           Neck:  carotid pulses are full and equal bilaterally, JVP normal, no carotid bruit        Respiratory:  normal breath sounds, clear to auscultation, normal A-P diameter, normal symmetry, normal respiratory excursion, no use of accessory muscles         Cardiac: regular rhythm;normal S1 and S2 bradycardic              pulses full and equal, no bruits auscultated                                        GI:  abdomen soft, non-tender, BS normoactive, no mass, no HSM, no bruits        Extremities and Muscular Skeletal:  no deformities, clubbing, cyanosis, erythema observed              Neurological:  no gross motor deficits        Psych:               Thank you for allowing me to participate in the care of your patient.    Sincerely,     Satnam Kasper MD     [facility]    "

## 2019-01-31 NOTE — TELEPHONE ENCOUNTER
Late Note:  Pt walked into clinic yesterday and told the  he had chest pain, with further discussion pt stated that he thought he was in A Fib and was told that he could come in for an EKG.  EKG completed and reviewed by Dr Rodriguez pt is in Coarse A fib rate 63 bpm.  Pt has a history of Coarse A Fib and 2 cardioversion's, one that last 2 years and one this past year in April.  Pt states that he is very tired, which it is note in past notes that pt had a decrease in stamina, which was improved after Cardioversion.  Pt states that he has felt this way since December.  Pt did state that he has times that he does feel just fine.  Pt also notes that he is having Abdominal issues, bloating feeling, but this too is not all the time.  Will discuss with Dr Jarvis and most likely have pt come in for an OV today, as pt was last seen by Dr Jarvis in April of last year. JNelsonROLAND

## 2019-01-31 NOTE — LETTER
1/31/2019    Andrew Elizalde MD  [address] [facility] [address]    RE: Pascual Esteswrocki       Dear Colleague,    I had the pleasure of seeing Pascual Perea in the [facility].    HPI and Plan:   Thank you for allowing me to participate in the care of this very delightful patient.  As you know, Pascual is a 78-year-old gentleman with history of stable CAD, status post coronary bypass more than 20 years ago when being seen for symptomatic persistent atrial fibrillation with controlled ventricular response.  The patient underwent a few cardioversions with last being almost a year ago with improvement of symptoms of mostly fatigue and short of breath while he was in atrial fibrillation.  I have suggested in the past that patient should benefit from rhythm control strategy with an antiarrhythmic drug.    Since the last cardioversion the patient has been doing quite well until this past December when he felt the fatigue and not just feeling well in general.  EKG obtained from today show either atrial fibrillation or atypical atrial flutter with a very regular ventricular response of 50 bpm.  When in sinus rhythm his average rate is about 70 bpm.    This is not a surprise finding given the natural precautions atrial fibrillation.  Patient would benefit from rhythm control strategy and to enhance the success rate I would have him on amiodarone at 200 mg in the morning and 400 mg at night for the next 2 weeks and then reduced down to 200 mg a day.  I would like to stop his metoprolol as bradycardia could be worsened by adding amiodarone.  Patient has been on Eliquis without missing a dose over this past month at least.  I emphasized the importance of not missing a single dose of Eliquis prior to the cardioversion.  I went over the procedure in detail including but not limited to respiratory distress and CVA.  I have the patient come back to see 1 of our advanced practice providers in  about a month for follow-up.  Should the patient have recurrence of atrial fibrillation despite being on amiodarone we may consider A. fib ablation versus AV node ablation and pacemaker implantation which may not be as beneficial as his symptoms are from mostly the loss of AV synchrony rather than rapid ventricular response.  Orders Placed This Encounter   Procedures     Follow-Up with Cardiac Advanced Practice Provider     Cardioversion       Orders Placed This Encounter   Medications     amiodarone (PACERONE/CODARONE) 200 MG tablet     Sig: Take 1 tablet (200 mg) by mouth every morning And 2 tablets (400 mg) every evening for 2 weeks then 1 tablet every morning     Dispense:  90 tablet     Refill:  3       Medications Discontinued During This Encounter   Medication Reason     metoprolol tartrate (LOPRESSOR) 50 MG tablet          Encounter Diagnosis   Name Primary?     Atrial fibrillation, unspecified type (H) Yes       CURRENT MEDICATIONS:  Current Outpatient Medications   Medication Sig Dispense Refill     amiodarone (PACERONE/CODARONE) 200 MG tablet Take 1 tablet (200 mg) by mouth every morning And 2 tablets (400 mg) every evening for 2 weeks then 1 tablet every morning 90 tablet 3     apixaban ANTICOAGULANT (ELIQUIS) 5 MG tablet Take 1 tablet (5 mg) by mouth 2 times daily 60 tablet 11     Cholecalciferol (VITAMIN D3 PO) Take 5,000 Units by mouth daily       cyanocolbalamin (VITAMIN  B-12) 1000 MCG tablet Take 5,000 mcg by mouth daily.       ferrous fumarate 65 mg, [place]. FE,-Vitamin C 125 mg (VITRON-C)  MG TABS tablet 1 tab twice a day for anemia 60 tablet 11     fluticasone (FLONASE) 50 MCG/ACT spray USE ONE TO TWO SPRAYS IN EACH NOSTRIL EVERY DAY 48 g 3     gemfibrozil (LOPID) 600 MG tablet Take 1 tablet (600 mg) by mouth 2 times daily 180 tablet 3     insulin aspart (NOVOLOG FLEXPEN) 100 UNIT/ML injection Inject 3 times daily (before meals). 2 units per carb choice (approx 5-10 units/meal) 30 mL 0      "insulin detemir (LEVEMIR FLEXPEN/FLEXTOUCH) 100 UNIT/ML pen Inject 35-40 Units Subcutaneous At Bedtime 45 mL 3     levothyroxine (SYNTHROID/LEVOTHROID) 125 MCG tablet TAKE ONE TABLET BY MOUTH EVERY DAY 90 tablet 3     lisinopril (PRINIVIL,ZESTRIL) 30 MG tablet Take 1 tablet (30 mg) by mouth daily 90 tablet 3     loratadine (CLARITIN) 10 MG tablet TAKE ONE TABLET BY MOUTH EVERY DAY 90 tablet 3     metFORMIN (GLUCOPHAGE) 1000 MG tablet TAKE ONE TABLET BY MOUTH TWO TIMES A DAY WITH MEALS 180 tablet 3     pravastatin (PRAVACHOL) 80 MG tablet TAKE ONE TABLET BY MOUTH EVERY NIGHT AT BEDTIME 90 tablet 3     ranitidine (ZANTAC) 150 MG tablet TAKE ONE TABLET BY MOUTH TWICE A  tablet 3     testosterone cypionate (DEPOTESTOTERONE) 200 MG/ML injection Inject 1.5 mLs (300 mg) into the muscle every 14 days 10 mL 1     blood glucose monitoring (ONE TOUCH ULTRA) test strip Test twice daily with One Touch Ultra Blue 200 each 3     GLUCOSE 4 GM OR CHEW as directed 30 11     INSUPEN ULTRAFIN 30G X 8 MM USE FOUR TIMES A  each 0     nitroglycerin (NITROSTAT) 0.4 MG SL tablet Place 1 tablet (0.4 mg) under the tongue every 5 minutes as needed for chest pain 25 tablet 1     ONETOUCH DELICA LANCETS 33G MISC 1 Device 3 times daily . Use to test blood sugars 3 times daily or as directed. 300 each prn     order for DME Equipment being ordered: 3ml syringes and 22G 1 1/2\" needles to use with testosterone inj every 2 weeks 30 Device 1       ALLERGIES     Allergies   Allergen Reactions     Omeprazole      diarrhea       PAST MEDICAL HISTORY:  Past Medical History:   Diagnosis Date     Adhesive capsulitis of shoulder      Anemia 3/10/2014     Anemia, unspecified      Antiplatelet or antithrombotic long-term use      Arrhythmia     Atrial fib/flutter     CAD (coronary artery disease)      History of cardioversion 04/24/2018    a single synchronized shock of 120 joules restored normal sinus rhythm     Hyperlipidemia LDL goal <70 " 5/26/2011     Hypertension      Hypothyroidism      Impotence of organic origin      Laceration of finger  June 2010     left thumb s/p sutures     Numbness and tingling     diabetic neuropathy bilateral feet     BRANDON (obstructive sleep apnea)     intolerant of CPAP, uses it occasionally.  Using mandibular device occasionally     Osteopenia      Pulmonary hypertension (H) 4/6/2012     Sensorineural hearing loss, unspecified      Stented coronary artery 1997    CABG      Testosterone deficiency      Type 2 diabetes mellitus without complication  (goal A1C<8) 10/24/2015     Typical atrial flutter (H) 4/18/16     Unspecified hypothyroidism      Vitamin D deficiency 9/3/2011       PAST SURGICAL HISTORY:  Past Surgical History:   Procedure Laterality Date     C NONSPECIFIC PROCEDURE  1995    CABG ([facility])     C NONSPECIFIC PROCEDURE  8/01    stress echo     CARDIAC SURGERY      bypass in 1997     CARDIOVERSION  04/24/2018     COLONOSCOPY       CORONARY ANGIOGRAPHY ADULT ORDER      4/2/2012     CORONARY ARTERY BYPASS  1997    [facility]     EXCISE MASS HEAD Left 8/18/2016    Procedure: EXCISE MASS HEAD;  Surgeon: Ivan Hernandez MD;  Location: [facility]     HEART CATH, ANGIOPLASTY  4/2012     PHACOEMULSIFICATION CLEAR CORNEA WITH STANDARD INTRAOCULAR LENS IMPLANT Left 6/8/2015    Procedure: PHACOEMULSIFICATION CLEAR CORNEA WITH STANDARD INTRAOCULAR LENS IMPLANT;  Surgeon: Nixon Farnsworth MD;  Location:  [facility]     PHACOEMULSIFICATION CLEAR CORNEA WITH STANDARD INTRAOCULAR LENS IMPLANT Right 6/22/2015    Procedure: PHACOEMULSIFICATION CLEAR CORNEA WITH STANDARD INTRAOCULAR LENS IMPLANT;  Surgeon: Nixon Farnsworth MD;  Location: [facility]     SEPTOPLASTY, TURBINOPLASTY, COMBINED Bilateral 8/18/2016    Procedure: COMBINED SEPTOPLASTY, TURBINOPLASTY;  Surgeon: Ivan Hernandez MD;  Location: [facility]       FAMILY HISTORY:  Family History   Problem Relation Age of Onset     Genitourinary Problems  Father         d: age 89; ARF     Hypertension Father      Diabetes Mother         d: age 68, CAD     Heart Disease Mother         MI     Myocardial Infarction Mother      Cardiovascular Brother         d:  age 31; CAD     Heart Disease Brother         age 31, MI     Diabetes Sister         b:  1939; DM2       SOCIAL HISTORY:  Social History     Socioeconomic History     Marital status:      Spouse name: None     Number of children: None     Years of education: None     Highest education level: None   Social Needs     Financial resource strain: None     Food insecurity - worry: None     Food insecurity - inability: None     Transportation needs - medical: None     Transportation needs - non-medical: None   Occupational History     None   Tobacco Use     Smoking status: Former Smoker     Packs/day: 1.00     Years: 6.00     Pack years: 6.00     Last attempt to quit: 1964     Years since quittin.6     Smokeless tobacco: Never Used     Tobacco comment: quit    Substance and Sexual Activity     Alcohol use: No     Drug use: No     Sexual activity: Yes     Partners: Female   Other Topics Concern     Parent/sibling w/ CABG, MI or angioplasty before 65F 55M? Not Asked      Service Not Asked     Blood Transfusions Not Asked     Caffeine Concern Yes     Comment: 2 cups coffee a day     Occupational Exposure Not Asked     Hobby Hazards Not Asked     Sleep Concern Yes     Comment: sleep apnea, wears cpap off and on     Stress Concern No     Weight Concern No     Special Diet Yes     Comment: diabetic diet      Back Care Not Asked     Exercise No     Comment: occ walk the mall     Bike Helmet Not Asked     Seat Belt Yes     Self-Exams Not Asked   Social History Narrative     None       Review of Systems:  Skin:  Negative       Eyes:  Positive for glasses reading  ENT:  Positive for nasal congestion chronic  Respiratory:  Positive for sleep apnea;dyspnea on exertion;shortness of breath    "  Cardiovascular:    fatigue;Positive for    Gastroenterology: Positive for heartburn treated  Genitourinary:  Negative      Musculoskeletal:  Positive for joint pain right thumb  Neurologic:  Positive for numbness or tingling of feet    Psychiatric:  Negative      Heme/Lymph/Imm:  Negative      Endocrine:  Positive for diabetes;thyroid disorder;night sweats      Physical Exam:  Vitals: BP (!) 161/93   Pulse 62   Ht 1.803 m (5' 10.98\")   Wt 95.3 kg (210 lb)   BMI 29.30 kg/m       Constitutional:  cooperative, alert and oriented, well developed, well nourished, in no acute distress        Skin:  warm and dry to the touch, no apparent skin lesions or masses noted          Head:  normocephalic, no masses or lesions        Eyes:  pupils equal and round, conjunctivae and lids unremarkable, sclera white, no xanthalasma, EOMS intact, no nystagmus        Lymph:No Cervical lymphadenopathy present     ENT:           Neck:  carotid pulses are full and equal bilaterally, JVP normal, no carotid bruit        Respiratory:  normal breath sounds, clear to auscultation, normal A-P diameter, normal symmetry, normal respiratory excursion, no use of accessory muscles         Cardiac: regular rhythm;normal S1 and S2 bradycardic              pulses full and equal, no bruits auscultated                                        GI:  abdomen soft, non-tender, BS normoactive, no mass, no HSM, no bruits        Extremities and Muscular Skeletal:  no deformities, clubbing, cyanosis, erythema observed              Neurological:  no gross motor deficits        Psych:           CC  No referring provider defined for this encounter.                Thank you for allowing me to participate in the care of your patient.      Sincerely,     Satnam Kasper MD     [facility]    cc:   No referring provider defined for this encounter.        "

## 2019-01-31 NOTE — TELEPHONE ENCOUNTER
Dr. Elizalde,    Patient was started on amiodarone which in combination with loratadine may increase risk of QT prolongation and torsade de pointes.    The cardiologist deferred to you regarding which antihistamine to switch Mr. Perea to.  Cetirizine 10 mg will not interact with the amiodarone.    Please send new Rx to the [facility].    Thank you,    Alvin Hayden, PharmD  [facility]  [phone]

## 2019-02-01 ENCOUNTER — TELEPHONE (OUTPATIENT)
Dept: CARDIOLOGY | Facility: CLINIC | Age: 79
End: 2019-02-01

## 2019-02-01 RX ORDER — FEXOFENADINE HCL 180 MG/1
180 TABLET ORAL DAILY
Qty: 90 TABLET | Refills: 3 | Status: SHIPPED | OUTPATIENT
Start: 2019-02-01 | End: 2020-06-20

## 2019-02-01 NOTE — TELEPHONE ENCOUNTER
Message left for patient regarding severe drug interaction with amiodarone and loratadine may increase risk of QT prolongation and torsade de pointes. .  Pharmacist has reached out to PMD regarding this for a change in antihistamine.  Also spoke to pharmacist (Tran) and asked that patient be communicated to when he picks up his medication.  ROLAND Sullivan

## 2019-02-02 NOTE — TELEPHONE ENCOUNTER
reviewed Micomedex. INneraction with Amiodarone and Claritin/Loratidine but no reaction between Amiodarone and Allegra/fexofenadine or Zyrtec/Cetirizine. Since Zyrtec can be more sedating than Fexofenadine. Rx for Fexofenaine 180mg tab, 1 tab daily as needed for allergy sx faxed to [facility]. Pt to stop taking Claritin/ Loratidine since now on Amiodarone. Inform pt

## 2019-02-04 DIAGNOSIS — E34.9 TESTOSTERONE DEFICIENCY: Primary | ICD-10-CM

## 2019-02-04 RX ORDER — SYRINGE WITH NEEDLE, 1 ML 25GX5/8"
SYRINGE, EMPTY DISPOSABLE MISCELLANEOUS
Qty: 30 EACH | Refills: 1 | Status: ON HOLD | OUTPATIENT
Start: 2019-02-04 | End: 2019-05-22

## 2019-02-04 NOTE — TELEPHONE ENCOUNTER
"Requested Prescriptions   Pending Prescriptions Disp Refills     B-D LUER-FELICITAS SYRINGE 22G X 1-1/2\" 3 ML MISC [Pharmacy Med Name: BD LL SYR 22G 1-1/2\" 3ML]  Last Written Prescription Date:  n/a  Last Fill Quantity: n/a,  # refills: n/a   Last Office Visit: 12/4/2018   Future Office Visit:      30 each 1     Sig: USE WITH TESTOSTERONE EVERY 2 WEEKS AS DIRECTED    There is no refill protocol information for this order          "

## 2019-02-13 ENCOUNTER — ANESTHESIA EVENT (OUTPATIENT)
Dept: MEDSURG UNIT | Facility: CLINIC | Age: 79
End: 2019-02-13
Payer: COMMERCIAL

## 2019-02-13 ENCOUNTER — HOSPITAL ENCOUNTER (OUTPATIENT)
Facility: CLINIC | Age: 79
Discharge: HOME | End: 2019-02-13
Attending: INTERNAL MEDICINE | Admitting: INTERNAL MEDICINE
Payer: COMMERCIAL

## 2019-02-13 ENCOUNTER — ANESTHESIA EVENT (OUTPATIENT)
Dept: SURGERY | Facility: CLINIC | Age: 79
End: 2019-02-13

## 2019-02-13 ENCOUNTER — ANESTHESIA (OUTPATIENT)
Dept: SURGERY | Facility: CLINIC | Age: 79
End: 2019-02-13

## 2019-02-13 ENCOUNTER — HOSPITAL ENCOUNTER (OUTPATIENT)
Dept: MEDSURG UNIT | Facility: CLINIC | Age: 79
End: 2019-02-13
Attending: INTERNAL MEDICINE | Admitting: INTERNAL MEDICINE
Payer: COMMERCIAL

## 2019-02-13 ENCOUNTER — ANESTHESIA (OUTPATIENT)
Dept: MEDSURG UNIT | Facility: CLINIC | Age: 79
End: 2019-02-13
Payer: COMMERCIAL

## 2019-02-13 VITALS
BODY MASS INDEX: 29.4 KG/M2 | HEART RATE: 53 BPM | HEIGHT: 71 IN | WEIGHT: 210 LBS | SYSTOLIC BLOOD PRESSURE: 152 MMHG | DIASTOLIC BLOOD PRESSURE: 90 MMHG | OXYGEN SATURATION: 98 % | RESPIRATION RATE: 18 BRPM | TEMPERATURE: 97.7 F

## 2019-02-13 DIAGNOSIS — E34.9 TESTOSTERONE DEFICIENCY: ICD-10-CM

## 2019-02-13 DIAGNOSIS — I48.91 ATRIAL FIBRILLATION, UNSPECIFIED TYPE (H): ICD-10-CM

## 2019-02-13 LAB
MAGNESIUM SERPL-MCNC: 1.6 MG/DL (ref 1.6–2.3)
POTASSIUM SERPL-SCNC: 4.3 MMOL/L (ref 3.4–5.3)

## 2019-02-13 PROCEDURE — 40000010 ZZH STATISTIC ANES STAT CODE-CRNA PER MINUTE

## 2019-02-13 PROCEDURE — 25000128 H RX IP 250 OP 636: Performed by: INTERNAL MEDICINE

## 2019-02-13 PROCEDURE — 25800030 ZZH RX IP 258 OP 636: Performed by: INTERNAL MEDICINE

## 2019-02-13 PROCEDURE — 84132 ASSAY OF SERUM POTASSIUM: CPT | Performed by: INTERNAL MEDICINE

## 2019-02-13 PROCEDURE — 25000128 H RX IP 250 OP 636: Performed by: NURSE ANESTHETIST, CERTIFIED REGISTERED

## 2019-02-13 PROCEDURE — 92960 CARDIOVERSION ELECTRIC EXT: CPT

## 2019-02-13 PROCEDURE — 37000008 ZZH ANESTHESIA TECHNICAL FEE, 1ST 30 MIN

## 2019-02-13 PROCEDURE — 40000065 ZZH STATISTIC EKG NON-CHARGEABLE

## 2019-02-13 PROCEDURE — 93010 ELECTROCARDIOGRAM REPORT: CPT | Performed by: INTERNAL MEDICINE

## 2019-02-13 PROCEDURE — 36415 COLL VENOUS BLD VENIPUNCTURE: CPT | Performed by: INTERNAL MEDICINE

## 2019-02-13 PROCEDURE — 92960 CARDIOVERSION ELECTRIC EXT: CPT | Performed by: INTERNAL MEDICINE

## 2019-02-13 PROCEDURE — 93005 ELECTROCARDIOGRAM TRACING: CPT

## 2019-02-13 PROCEDURE — 83735 ASSAY OF MAGNESIUM: CPT | Performed by: INTERNAL MEDICINE

## 2019-02-13 RX ORDER — POTASSIUM CHLORIDE 1500 MG/1
20 TABLET, EXTENDED RELEASE ORAL
Status: DISCONTINUED | OUTPATIENT
Start: 2019-02-13 | End: 2019-02-13 | Stop reason: HOSPADM

## 2019-02-13 RX ORDER — MAGNESIUM SULFATE HEPTAHYDRATE 40 MG/ML
2 INJECTION, SOLUTION INTRAVENOUS
Status: DISCONTINUED | OUTPATIENT
Start: 2019-02-13 | End: 2019-02-13 | Stop reason: HOSPADM

## 2019-02-13 RX ORDER — SODIUM CHLORIDE 9 MG/ML
INJECTION, SOLUTION INTRAVENOUS CONTINUOUS
Status: DISCONTINUED | OUTPATIENT
Start: 2019-02-13 | End: 2019-02-13 | Stop reason: HOSPADM

## 2019-02-13 RX ORDER — NALOXONE HYDROCHLORIDE 0.4 MG/ML
.1-.4 INJECTION, SOLUTION INTRAMUSCULAR; INTRAVENOUS; SUBCUTANEOUS
Status: DISCONTINUED | OUTPATIENT
Start: 2019-02-13 | End: 2019-02-13 | Stop reason: HOSPADM

## 2019-02-13 RX ORDER — POTASSIUM CHLORIDE 1500 MG/1
40 TABLET, EXTENDED RELEASE ORAL
Status: DISCONTINUED | OUTPATIENT
Start: 2019-02-13 | End: 2019-02-13 | Stop reason: HOSPADM

## 2019-02-13 RX ORDER — ATROPINE SULFATE 0.1 MG/ML
.5-1 INJECTION INTRAVENOUS
Status: DISCONTINUED | OUTPATIENT
Start: 2019-02-13 | End: 2019-02-13 | Stop reason: HOSPADM

## 2019-02-13 RX ORDER — PROPOFOL 10 MG/ML
INJECTION, EMULSION INTRAVENOUS PRN
Status: DISCONTINUED | OUTPATIENT
Start: 2019-02-13 | End: 2019-02-13

## 2019-02-13 RX ORDER — FLUMAZENIL 0.1 MG/ML
0.2 INJECTION, SOLUTION INTRAVENOUS
Status: DISCONTINUED | OUTPATIENT
Start: 2019-02-13 | End: 2019-02-13 | Stop reason: HOSPADM

## 2019-02-13 RX ADMIN — MAGNESIUM SULFATE HEPTAHYDRATE 2 G: 40 INJECTION, SOLUTION INTRAVENOUS at 10:09

## 2019-02-13 RX ADMIN — PROPOFOL 80 MG: 10 INJECTION, EMULSION INTRAVENOUS at 10:55

## 2019-02-13 RX ADMIN — SODIUM CHLORIDE: 9 INJECTION, SOLUTION INTRAVENOUS at 09:40

## 2019-02-13 ASSESSMENT — ENCOUNTER SYMPTOMS: DYSRHYTHMIAS: 1

## 2019-02-13 ASSESSMENT — MIFFLIN-ST. JEOR: SCORE: 1699.68

## 2019-02-13 NOTE — IP AVS SNAPSHOT
MRN:[id number]                      After Visit Summary   2/13/2019    Pascual Perea    MRN: [id number]           Visit Information        Department      2/13/2019  8:41 AM [facility]          Review of your medicines      UNREVIEWED medicines. Ask your doctor about these medicines       Dose / Directions   amiodarone 200 MG tablet  Commonly known as:  PACERONE/CODARONE  Used for:  Atrial fibrillation, unspecified type (H)      Dose:  200 mg  Take 1 tablet (200 mg) by mouth every morning And 2 tablets (400 mg) every evening for 2 weeks then 1 tablet every morning  Quantity:  90 tablet  Refills:  3     apixaban ANTICOAGULANT 5 MG tablet  Commonly known as:  ELIQUIS  Used for:  Typical atrial flutter (H)      Dose:  5 mg  Take 1 tablet (5 mg) by mouth 2 times daily  Quantity:  60 tablet  Refills:  11     ferrous fumarate 65 mg ([place]. FE)-Vitamin C 125 mg  MG Tabs tablet  Commonly known as:  VITRON-C  Used for:  Iron deficiency anemia, unspecified iron deficiency anemia type      1 tab twice a day for anemia  Quantity:  60 tablet  Refills:  11     fexofenadine 180 MG tablet  Commonly known as:  ALLEGRA  Used for:  Allergic rhinitis, unspecified seasonality, unspecified trigger      Dose:  180 mg  Take 1 tablet (180 mg) by mouth daily  Quantity:  90 tablet  Refills:  3     fluticasone 50 MCG/ACT nasal spray  Commonly known as:  FLONASE  Used for:  Chronic allergic rhinitis, unspecified seasonality, unspecified trigger      USE ONE TO TWO SPRAYS IN EACH NOSTRIL EVERY DAY  Quantity:  48 g  Refills:  3     gemfibrozil 600 MG tablet  Commonly known as:  LOPID  Used for:  Hyperlipidemia LDL goal <70      Dose:  600 mg  Take 1 tablet (600 mg) by mouth 2 times daily  Quantity:  180 tablet  Refills:  3     glucose 4 g chewable tablet  Commonly known as:  BD GLUCOSE  Used for:  DIABETES UNCOMPL ADULT-TYPE II      as directed  Quantity:  30  Refills:  11     insulin aspart 100  UNIT/ML pen  Commonly known as:  NovoLOG FLEXPEN  Used for:  Type 2 diabetes mellitus without complication, with long-term current use of insulin (H)      Inject 3 times daily (before meals). 2 units per carb choice (approx 5-10 units/meal)  Quantity:  30 mL  Refills:  0     insulin detemir 100 UNIT/ML pen  Commonly known as:  LEVEMIR FLEXPEN/FLEXTOUCH  Used for:  Type 2 diabetes mellitus without complication, with long-term current use of insulin (H)      Inject 35-40 Units Subcutaneous At Bedtime  Quantity:  45 mL  Refills:  3     levothyroxine 125 MCG tablet  Commonly known as:  SYNTHROID/LEVOTHROID  Used for:  Hypothyroidism, unspecified type      TAKE ONE TABLET BY MOUTH EVERY DAY  Quantity:  90 tablet  Refills:  3     lisinopril 30 MG tablet  Commonly known as:  PRINIVIL/ZESTRIL  Used for:  Essential hypertension, benign      Dose:  30 mg  Take 1 tablet (30 mg) by mouth daily  Quantity:  90 tablet  Refills:  3     metFORMIN 1000 MG tablet  Commonly known as:  GLUCOPHAGE  Used for:  Type 2 diabetes mellitus without complication, with long-term current use of insulin (H)      TAKE ONE TABLET BY MOUTH TWO TIMES A DAY WITH MEALS  Quantity:  180 tablet  Refills:  3     nitroGLYcerin 0.4 MG sublingual tablet  Commonly known as:  NITROSTAT  Used for:  Unstable angina (H)      Dose:  0.4 mg  Place 1 tablet (0.4 mg) under the tongue every 5 minutes as needed for chest pain  Quantity:  25 tablet  Refills:  1     pravastatin 80 MG tablet  Commonly known as:  PRAVACHOL  Used for:  Hyperlipidemia LDL goal <70      TAKE ONE TABLET BY MOUTH EVERY NIGHT AT BEDTIME  Quantity:  90 tablet  Refills:  3     ranitidine 150 MG tablet  Commonly known as:  ZANTAC  Used for:  Gastroesophageal reflux disease without esophagitis      TAKE ONE TABLET BY MOUTH TWICE A DAY  Quantity:  180 tablet  Refills:  3     testosterone cypionate 200 MG/ML injection  Commonly known as:  DEPOTESTOSTERONE  Used for:  Testosterone deficiency      Dose:   "300 mg  Inject 1.5 mLs (300 mg) into the muscle every 14 days  Quantity:  10 mL  Refills:  1     vitamin B-12 1000 MCG tablet  Commonly known as:  CYANOCOBALAMIN      Dose:  5000 mcg  Take 5,000 mcg by mouth daily.  Refills:  0     VITAMIN D3 PO      Dose:  5000 Units  Take 5,000 Units by mouth daily  Refills:  0        CONTINUE these medicines which have NOT CHANGED       Dose / Directions   B-D LUER-FELICITAS SYRINGE 22G X 1-1/2\" 3 ML Misc  Used for:  Testosterone deficiency  Generic drug:  syringe/needle (disp)      USE WITH TESTOSTERONE EVERY 2 WEEKS AS DIRECTED  Quantity:  30 each  Refills:  1     blood glucose test strip  Commonly known as:  ONETOUCH ULTRA  Used for:  Type 2 diabetes mellitus without complication, with long-term current use of insulin (H)      Test twice daily with One Touch Ultra Blue  Quantity:  200 each  Refills:  3     INSUPEN ULTRAFIN 30G X 8 MM miscellaneous  Used for:  Type 2 diabetes mellitus without complication, with long-term current use of insulin (H)  Generic drug:  insulin pen needle      USE FOUR TIMES A DAY  Quantity:  400 each  Refills:  0     ONETOUCH DELICA LANCETS 33G Misc  Used for:  Type 2 diabetes, HbA1C goal < 8% (H)      Dose:  1 Device  1 Device 3 times daily . Use to test blood sugars 3 times daily or as directed.  Quantity:  300 each  Refills:  prn     order for DME  Used for:  Testosterone deficiency      Equipment being ordered: 3ml syringes and 22G 1 1/2\" needles to use with testosterone inj every 2 weeks  Quantity:  30 Device  Refills:  1              Protect others around you: Learn how to safely use, store and throw away your medicines at www.disposemymeds.org.       Follow-ups after your visit       Your next 10 appointments already scheduled    Feb 13, 2019 10:45 AM CST  Cardioversion External with  CARE SUITES (1ST [place])  [facility] (--) [address] [address] [phone]   1) NPO for 8 hours prior to the procedure except for " sips of water with medications. 2) Hold insulin or oral diabetic medications morning of procedure. May take   dose long acting insulin. Follow further instructions of PMD. 3) Continue daily Coumadin. ~ If taking Digoxin, hold AM of procedure. ~ Plan to have a responsible adult take you home after the exam. You may not drive, take a bus or taxi by yourself. ~ A responsible adult must stay with you for 24 hours after the test.   Feb 13, 2019  Procedure with GENERIC ANESTHESIA PROVIDER  [facility] (--) [address], [address]  [facility] [address]  [phone]   Feb 28, 2019  1:50 PM CST  [facility] EP RETURN with Rebekah Marrero PA-C  [facility] ([facility]) [address]  [place] [id number] [phone] OPT 2      Care Instructions       Further instructions from your care team       Cardioversion Discharge Instructions    After you go home:       For 24 hours - due to the sedation you received:      Have an adult stay with you for 24 hours.     Relax and take it easy.    Do NOT make any important or legal decisions.    Do NOT drive or operate machines at home or at work.    Do NOT drink alcohol.    Diet:      Start with clear liquids and progress to your normal diet as you feel able.    Medicines:      Take your medications, including blood thinners, unless your provider tells you not to.    If you have stopped any medications, check with your provider about when to restart them.    Follow Up Appointments:      Follow up with your cardiologist at [facility] of patient preference as instructed.    Follow up with your primary care provider as needed.    Post cardioversion:    The skin on your chest or back may feel tender for 48 hours.  If your skin is tender, you may:      Use a cold pack on the site. Never use ice directly on your skin. Use the cold pack for 20 minutes. Remove it for at least 30 minutes before  "re-using.    Apply 1% hydrocortisone cream to the skin (sold at drug stores)    Take Advil (Ibuprofen) or Tylenol (Acetaminophen) per your provider's recommendations.      Call your provider if you have:      Weakness, dizziness, lightheadedness, or fainting.    Shortness of breath.    Irregular heartbeat, feelings of your heart fluttering or beating fast, hard or palpitations.     More than minor skin discomfort or redness where the cardioversion pads were placed.    Questions or concerns.      Call 911 if you have:      Pain in your chest, arm, shoulder, neck, or upper back.    You have problems speaking or seeing.    Weakness in your arm or leg.    You are unable to move your arm or leg.    You have uncontrolled bleeding.         [facility]:    [phone] UM (7 days a week)          Additional Information About Your Visit       MyChart Information    [facility] gives you secure access to your electronic health record. If you see a primary care provider, you can also send messages to your care team and make appointments. If you have questions, please call your primary care clinic.  If you do not have a primary care provider, please call [phone] and they will assist you.       Care EveryWhere ID    This is your Care EveryWhere ID. This could be used by other organizations to access your [facility] medical records  [id number]       Your Vitals Were     Pulse   67          Temperature   97.7  F (36.5  C) (Oral)          Respirations   18          Height   1.803 m (5' 11\")          Weight   95.3 kg (209 lb 16 oz)             Pulse Oximetry   98%    BMI (Body Mass Index)   29.29 kg/m           Primary Care Provider Office Phone # Fax #    Andrew Elizalde [phone]      Equal Access to Services    AMY FLORES : Odette Jensen, maty reeder, suhail nance. So [facility] " "[phone].    ATENCIÓN: Si bre ma, tiene a cooley disposición servicios gratuitos de asistencia lingüística. Iftikhar koehler [phone].    We comply with applicable federal civil rights laws and [place] laws. We do not discriminate on the basis of race, color, national origin, age, disability, sex, sexual orientation, or gender identity.           Thank you!    Thank you for choosing [facility] for your care. Our goal is always to provide you with excellent care. Hearing back from our patients is one way we can continue to improve our services. Please take a few minutes to complete the written survey that you may receive in the mail after you visit with us. Thank you!            Medication List      Medications          Morning Afternoon Evening Bedtime As Needed    B-D LUER-FELICITAS SYRINGE 22G X 1-1/2\" 3 ML Misc  INSTRUCTIONS:  USE WITH TESTOSTERONE EVERY 2 WEEKS AS DIRECTED  Generic drug:  syringe/needle (disp)                     blood glucose test strip  Also known as:  ONETOUCH ULTRA  INSTRUCTIONS:  Test twice daily with One Touch Ultra Blue  Doctor's comments:  Profile only. Pt doesn't require refill at this time                     INSUPEN ULTRAFIN 30G X 8 MM miscellaneous  INSTRUCTIONS:  USE FOUR TIMES A DAY  Doctor's comments:  Medication is being filled for 1 time refill only due to:  Patient needs office visit.  Generic drug:  insulin pen needle                     ONETOUCH DELICA LANCETS 33G Misc  INSTRUCTIONS:  1 Device 3 times daily . Use to test blood sugars 3 times daily or as directed.                     order for DME  INSTRUCTIONS:  Equipment being ordered: 3ml syringes and 22G 1 1/2\" needles to use with testosterone inj every 2 weeks                       ASK your doctor about these medications          Morning Afternoon Evening Bedtime As Needed    amiodarone 200 MG tablet  Also known as:  PACERONE/CODARONE  INSTRUCTIONS:  Take 1 tablet (200 mg) by mouth every morning And 2 tablets (400 mg) every " evening for 2 weeks then 1 tablet every morning                     apixaban ANTICOAGULANT 5 MG tablet  Also known as:  ELIQUIS  INSTRUCTIONS:  Take 1 tablet (5 mg) by mouth 2 times daily                     ferrous fumarate 65 mg ([place]. FE)-Vitamin C 125 mg  MG Tabs tablet  Also known as:  VITRON-C  INSTRUCTIONS:  1 tab twice a day for anemia                     fexofenadine 180 MG tablet  Also known as:  ALLEGRA  INSTRUCTIONS:  Take 1 tablet (180 mg) by mouth daily  Doctor's comments:  Disp generic                     fluticasone 50 MCG/ACT nasal spray  Also known as:  FLONASE  INSTRUCTIONS:  USE ONE TO TWO SPRAYS IN EACH NOSTRIL EVERY DAY                     gemfibrozil 600 MG tablet  Also known as:  LOPID  INSTRUCTIONS:  Take 1 tablet (600 mg) by mouth 2 times daily                     glucose 4 g chewable tablet  Also known as:  BD GLUCOSE  INSTRUCTIONS:  as directed                     insulin aspart 100 UNIT/ML pen  Also known as:  NovoLOG FLEXPEN  INSTRUCTIONS:  Inject 3 times daily (before meals). 2 units per carb choice (approx 5-10 units/meal)                     insulin detemir 100 UNIT/ML pen  Also known as:  LEVEMIR FLEXPEN/FLEXTOUCH  INSTRUCTIONS:  Inject 35-40 Units Subcutaneous At Bedtime  Doctor's comments:  Profile only. Pt doesn't require refill at this time                     levothyroxine 125 MCG tablet  Also known as:  SYNTHROID/LEVOTHROID  INSTRUCTIONS:  TAKE ONE TABLET BY MOUTH EVERY DAY                     lisinopril 30 MG tablet  Also known as:  PRINIVIL/ZESTRIL  INSTRUCTIONS:  Take 1 tablet (30 mg) by mouth daily                     metFORMIN 1000 MG tablet  Also known as:  GLUCOPHAGE  INSTRUCTIONS:  TAKE ONE TABLET BY MOUTH TWO TIMES A DAY WITH MEALS                     nitroGLYcerin 0.4 MG sublingual tablet  Also known as:  NITROSTAT  INSTRUCTIONS:  Place 1 tablet (0.4 mg) under the tongue every 5 minutes as needed for chest pain                     pravastatin 80 MG tablet  Also  known as:  PRAVACHOL  INSTRUCTIONS:  TAKE ONE TABLET BY MOUTH EVERY NIGHT AT BEDTIME                     ranitidine 150 MG tablet  Also known as:  ZANTAC  INSTRUCTIONS:  TAKE ONE TABLET BY MOUTH TWICE A DAY                     testosterone cypionate 200 MG/ML injection  Also known as:  DEPOTESTOSTERONE  INSTRUCTIONS:  Inject 1.5 mLs (300 mg) into the muscle every 14 days                     vitamin B-12 1000 MCG tablet  Also known as:  CYANOCOBALAMIN  INSTRUCTIONS:  Take 5,000 mcg by mouth daily.                     VITAMIN D3 PO  INSTRUCTIONS:  Take 5,000 Units by mouth daily

## 2019-02-13 NOTE — IP AVS SNAPSHOT
[place] [address] [place] [address]  Phone:  [phone]                                    After Visit Summary   2/13/2019    Pascual Perea    MRN: [id number]           After Visit Summary Signature Page    I have received my discharge instructions, and my questions have been answered. I have discussed any challenges I see with this plan with the nurse or doctor.    ..........................................................................................................................................  Patient/Patient Representative Signature      ..........................................................................................................................................  Patient Representative Print Name and Relationship to Patient    ..................................................               ................................................  Date                                   Time    ..........................................................................................................................................  Reviewed by Signature/Title    ...................................................              ..............................................  Date                                               Time          22EPIC Rev 08/18

## 2019-02-13 NOTE — PROGRESS NOTES
Patient arrived ambulatory to procedure area. Patient is alert and oriented. Admission intake info received. No dentures or loose teeth. Patient has been NPO sufficient amount of time. PIV begun, IVF infusing, lab results noted and Mg+ replaced per order.     Discharge / post procedure instructions given to pt and wife and son pre procedure w/ verbal understanding received.  All questions & concerns addressed.      1025- MD Sedation Assessment completed. Consent signed at this time.   MDA also here to assess pt.    CDV:      After assessment and time out were done anesthesia team and cardiologist ready to perform DCCV. Propofol used for sedation while ambu-bagging was done by CRNA. Cardiologist gave one shock @ 200 joules and patient converted to SR. See rhythm strips and 12 lead EKG.    After sufficient recovery time patient was ready for discharge. Resp even & unlabored. Patient remained stable in vital signs. Tolerating oral fluids without issue. PIV removed, clean dressing applied. Pt discharged per w/c to private vehicle driven by son. All personal belongings sent with pt.

## 2019-02-13 NOTE — TELEPHONE ENCOUNTER
testosterone cypionate (DEPOTESTOTERONE) 200 MG/ML injection      Last Written Prescription Date:  9/11/2018  Last Fill Quantity: 10 mL,   # refills: 1  Last Office Visit: 12/04/2018  Future Office visit:       Routing refill request to provider for review/approval because:  Drug not on the [facility], [facility] or [facility] refill protocol or controlled substance

## 2019-02-13 NOTE — ANESTHESIA POSTPROCEDURE EVALUATION
Patient: Pascual Perea    * No procedures listed *    Diagnosis:* No pre-op diagnosis entered *  Diagnosis Additional Information: No value filed.    Anesthesia Type:  General    Note:  Anesthesia Post Evaluation    Patient location during evaluation: Bedside  Patient participation: Able to fully participate in evaluation  Level of consciousness: awake  Pain management: adequate  Airway patency: patent  Cardiovascular status: acceptable  Respiratory status: acceptable  Hydration status: acceptable  PONV: none     Anesthetic complications: None          Last vitals:  There were no vitals filed for this visit.      Electronically Signed By: Pablo Collins MD  February 13, 2019  3:39 PM

## 2019-02-13 NOTE — ANESTHESIA PREPROCEDURE EVALUATION
Anesthesia Pre-Procedure Evaluation    Patient: Pascual Perea   MRN: [id number] : 1940          Preoperative Diagnosis: * No pre-op diagnosis entered *    * No procedures listed *    Past Medical History:   Diagnosis Date     Adhesive capsulitis of shoulder      Anemia 3/10/2014     Anemia, unspecified      Antiplatelet or antithrombotic long-term use      Arrhythmia     Atrial fib/flutter     CAD (coronary artery disease)      History of cardioversion 2018    a single synchronized shock of 120 joules restored normal sinus rhythm     Hyperlipidemia LDL goal <70 2011     Hypertension      Hypothyroidism      Impotence of organic origin      Laceration of finger  2010     left thumb s/p sutures     Numbness and tingling     diabetic neuropathy bilateral feet     BRANDON (obstructive sleep apnea)     intolerant of CPAP, uses it occasionally.  Using mandibular device occasionally     Osteopenia      Pulmonary hypertension (H) 2012     Sensorineural hearing loss, unspecified      Stented coronary artery     CABG      Testosterone deficiency      Type 2 diabetes mellitus without complication  (goal A1C<8) 10/24/2015     Typical atrial flutter (H) 16     Unspecified hypothyroidism      Vitamin D deficiency 9/3/2011     Past Surgical History:   Procedure Laterality Date     C NONSPECIFIC PROCEDURE      CABG ([facility])     C NONSPECIFIC PROCEDURE      stress echo     CARDIAC SURGERY      bypass in      CARDIOVERSION  2018     COLONOSCOPY       CORONARY ANGIOGRAPHY ADULT ORDER      2012     CORONARY ARTERY BYPASS      [facility]     EXCISE MASS HEAD Left 2016    Procedure: EXCISE MASS HEAD;  Surgeon: Ivan Hernandez MD;  Location: [facility]     HEART CATH, ANGIOPLASTY  2012     PHACOEMULSIFICATION CLEAR CORNEA WITH STANDARD INTRAOCULAR LENS IMPLANT Left 2015    Procedure: PHACOEMULSIFICATION CLEAR CORNEA WITH STANDARD INTRAOCULAR  LENS IMPLANT;  Surgeon: Nixon Farnsworth MD;  Location:  [facility]     PHACOEMULSIFICATION CLEAR CORNEA WITH STANDARD INTRAOCULAR LENS IMPLANT Right 6/22/2015    Procedure: PHACOEMULSIFICATION CLEAR CORNEA WITH STANDARD INTRAOCULAR LENS IMPLANT;  Surgeon: Nixon Farnsworth MD;  Location:  [facility]     SEPTOPLASTY, TURBINOPLASTY, COMBINED Bilateral 8/18/2016    Procedure: COMBINED SEPTOPLASTY, TURBINOPLASTY;  Surgeon: Ivan Hernandez MD;  Location: [facility]       Anesthesia Evaluation     . Pt has had prior anesthetic. Type: General    No history of anesthetic complications          ROS/MED HX    ENT/Pulmonary:     (+)sleep apnea, uses CPAP , . .    Neurologic:     (+)neuropathy     Cardiovascular:     (+) Dyslipidemia, hypertension--CAD, -CABG-. Taking blood thinners : . . . :. dysrhythmias a-flutter, . pulmonary hypertension,       METS/Exercise Tolerance:  >4 METS   Hematologic:     (+) Anemia, -      Musculoskeletal:         GI/Hepatic:     (+) GERD       Renal/Genitourinary:         Endo:     (+) type II DM thyroid problem hypothyroidism, .      Psychiatric:         Infectious Disease:         Malignancy:         Other:                          Physical Exam      Airway   Mallampati: II  TM distance: >3 FB  Neck ROM: full    Dental     Cardiovascular   Rhythm and rate: regular and normal      Pulmonary    breath sounds clear to auscultation            Lab Results   Component Value Date    WBC 4.3 08/13/2018    HGB 15.6 08/13/2018    HCT 45.3 08/13/2018     08/13/2018    SED 4 02/24/2017     08/13/2018    POTASSIUM 4.3 02/13/2019    CHLORIDE 105 08/13/2018    CO2 30 08/13/2018    BUN 15 08/13/2018    CR 0.97 08/13/2018    GLC 97 08/13/2018    STARR 9.0 08/13/2018    MAG 1.6 02/13/2019    ALBUMIN 4.1 08/13/2018    PROTTOTAL 6.9 08/13/2018    ALT 21 08/13/2018    AST 18 08/13/2018    ALKPHOS 58 08/13/2018    BILITOTAL 1.1 08/13/2018    LIPASE 70 03/30/2012    PTT 52 (H) 04/18/2018    INR 0.94  "03/30/2012    TSH 0.99 08/13/2018    T4 0.82 04/15/2016       Preop Vitals  BP Readings from Last 3 Encounters:   02/13/19 152/90   01/31/19 (!) 161/93   12/04/18 116/64    Pulse Readings from Last 3 Encounters:   02/13/19 53   01/31/19 62   12/04/18 66      Resp Readings from Last 3 Encounters:   02/13/19 18   12/04/18 16   09/15/18 20    SpO2 Readings from Last 3 Encounters:   02/13/19 98%   12/04/18 98%   05/01/18 96%      Temp Readings from Last 1 Encounters:   02/13/19 36.5  C (97.7  F) (Oral)    Ht Readings from Last 1 Encounters:   02/13/19 1.803 m (5' 11\")      Wt Readings from Last 1 Encounters:   02/13/19 95.3 kg (209 lb 16 oz)    Estimated body mass index is 29.29 kg/m  as calculated from the following:    Height as of an earlier encounter on 2/13/19: 1.803 m (5' 11\").    Weight as of an earlier encounter on 2/13/19: 95.3 kg (209 lb 16 oz).       Anesthesia Plan      History & Physical Review  History and physical reviewed and following examination; no interval change.    ASA Status:  3 .    NPO Status:  > 8 hours    Plan for General with Intravenous and Propofol induction.          Postoperative Care      Consents  Anesthetic plan, risks, benefits and alternatives discussed with:  Patient..                 Pablo Collins MD  "

## 2019-02-13 NOTE — DISCHARGE INSTRUCTIONS
Cardioversion Discharge Instructions    After you go home:       For 24 hours - due to the sedation you received:      Have an adult stay with you for 24 hours.     Relax and take it easy.    Do NOT make any important or legal decisions.    Do NOT drive or operate machines at home or at work.    Do NOT drink alcohol.    Diet:      Start with clear liquids and progress to your normal diet as you feel able.    Medicines:      Take your medications, including blood thinners, unless your provider tells you not to.    If you have stopped any medications, check with your provider about when to restart them.    Follow Up Appointments:      Follow up with your cardiologist at [facility] of patient preference as instructed.    Follow up with your primary care provider as needed.    Post cardioversion:    The skin on your chest or back may feel tender for 48 hours.  If your skin is tender, you may:      Use a cold pack on the site. Never use ice directly on your skin. Use the cold pack for 20 minutes. Remove it for at least 30 minutes before re-using.    Apply 1% hydrocortisone cream to the skin (sold at drug stores)    Take Advil (Ibuprofen) or Tylenol (Acetaminophen) per your provider's recommendations.      Call your provider if you have:      Weakness, dizziness, lightheadedness, or fainting.    Shortness of breath.    Irregular heartbeat, feelings of your heart fluttering or beating fast, hard or palpitations.     More than minor skin discomfort or redness where the cardioversion pads were placed.    Questions or concerns.      Call 911 if you have:      Pain in your chest, arm, shoulder, neck, or upper back.    You have problems speaking or seeing.    Weakness in your arm or leg.    You are unable to move your arm or leg.    You have uncontrolled bleeding.         [facility] [place]:    [phone] [facility] (7 days a week)

## 2019-02-14 NOTE — PROCEDURES
Informed consent obtained for elective DC cardioversion of atrial fibrillation. Rhythm confirmed with pre-procedure ECG.  Serum potassium 4.3, creatinine 0.97, hemoglobin 15.6.  Patient is on oral Amiodarone 200 mg daily and Eliquis anticoagulation 5 mg two times daily.    Patient was successfully cardioverted to sinus rhythm with a single 200 J synchronized DC shock. Sedation by anesthesia team.      RECOMMENDATIONS:  1.  Continue uninterrupted Eliquis anticoagulation.  2.  Patient has scheduled clinic follow-up with cardiology KOJO, on 2/28/2019.     Dr. ZACHARY Richard MD [facility]  Cardiology.

## 2019-02-15 LAB
INTERPRETATION ECG - MUSE: NORMAL
INTERPRETATION ECG - MUSE: NORMAL

## 2019-02-19 DIAGNOSIS — K21.9 GASTROESOPHAGEAL REFLUX DISEASE WITHOUT ESOPHAGITIS: ICD-10-CM

## 2019-02-20 RX ORDER — TESTOSTERONE CYPIONATE 200 MG/ML
300 INJECTION, SOLUTION INTRAMUSCULAR
Qty: 10 ML | Refills: 1 | Status: SHIPPED | OUTPATIENT
Start: 2019-02-20 | End: 2019-10-02

## 2019-02-20 NOTE — TELEPHONE ENCOUNTER
Refill request for testosterone approved.  Paper prescription in  [place] basket.  Please bring to the [facility] pharmacy to be filled.   Please also call patient and notify him of need for repeat testosterone labs nonfasting in early March.  Patient to time the lab appointment date so it is done 1-2 days prior to when he is due for a testosterone injection

## 2019-02-20 NOTE — TELEPHONE ENCOUNTER
"Requested Prescriptions   Pending Prescriptions Disp Refills     ranitidine (ZANTAC) 150 MG tablet [Pharmacy Med Name: RANITIDINE HCL 150MG TABS] 180 tablet 3     Sig: TAKE ONE TABLET BY MOUTH TWICE A DAY    H2 Blockers Protocol Passed - 2/19/2019  8:57 PM       Passed - Patient is age 12 or older       Passed - Recent (12 mo) or future (30 days) visit within the authorizing provider's specialty    Patient had office visit in the last 12 months or has a visit in the next 30 days with authorizing provider or within the authorizing provider's specialty.  See \"Patient Info\" tab in inbasket, or \"Choose Columns\" in Meds & Orders section of the refill encounter.             Passed - Medication is active on med list          "

## 2019-02-21 NOTE — TELEPHONE ENCOUNTER
Signed script brought to [facility], pt advised to come in and get labs done.

## 2019-03-13 ENCOUNTER — APPOINTMENT (OUTPATIENT)
Dept: CT IMAGING | Facility: CLINIC | Age: 79
DRG: 418 | End: 2019-03-13
Attending: EMERGENCY MEDICINE
Payer: COMMERCIAL

## 2019-03-13 ENCOUNTER — HOSPITAL ENCOUNTER (INPATIENT)
Facility: CLINIC | Age: 79
LOS: 5 days | Discharge: HOME | DRG: 418 | End: 2019-03-18
Attending: EMERGENCY MEDICINE | Admitting: INTERNAL MEDICINE
Payer: COMMERCIAL

## 2019-03-13 ENCOUNTER — APPOINTMENT (OUTPATIENT)
Dept: ULTRASOUND IMAGING | Facility: CLINIC | Age: 79
DRG: 418 | End: 2019-03-13
Attending: EMERGENCY MEDICINE
Payer: COMMERCIAL

## 2019-03-13 ENCOUNTER — OFFICE VISIT (OUTPATIENT)
Dept: URGENT CARE | Facility: URGENT CARE | Age: 79
End: 2019-03-13
Payer: COMMERCIAL

## 2019-03-13 VITALS
OXYGEN SATURATION: 94 % | HEART RATE: 63 BPM | TEMPERATURE: 97.7 F | DIASTOLIC BLOOD PRESSURE: 56 MMHG | SYSTOLIC BLOOD PRESSURE: 94 MMHG

## 2019-03-13 DIAGNOSIS — R10.9 ABDOMINAL PAIN: ICD-10-CM

## 2019-03-13 DIAGNOSIS — I10 ESSENTIAL HYPERTENSION, BENIGN: ICD-10-CM

## 2019-03-13 DIAGNOSIS — I95.9 HYPOTENSION, UNSPECIFIED HYPOTENSION TYPE: Primary | ICD-10-CM

## 2019-03-13 DIAGNOSIS — D50.9 IRON DEFICIENCY ANEMIA, UNSPECIFIED IRON DEFICIENCY ANEMIA TYPE: ICD-10-CM

## 2019-03-13 DIAGNOSIS — E78.5 HYPERLIPIDEMIA LDL GOAL <70: ICD-10-CM

## 2019-03-13 DIAGNOSIS — A49.8 ESCHERICHIA COLI (E. COLI) INFECTION: ICD-10-CM

## 2019-03-13 DIAGNOSIS — Z79.4 TYPE 2 DIABETES MELLITUS WITHOUT COMPLICATION, WITH LONG-TERM CURRENT USE OF INSULIN (H): ICD-10-CM

## 2019-03-13 DIAGNOSIS — E11.9 TYPE 2 DIABETES MELLITUS WITHOUT COMPLICATION, WITH LONG-TERM CURRENT USE OF INSULIN (H): ICD-10-CM

## 2019-03-13 DIAGNOSIS — G89.18 POSTOPERATIVE PAIN: Primary | ICD-10-CM

## 2019-03-13 LAB
ALBUMIN SERPL-MCNC: 3.5 G/DL (ref 3.4–5)
ALBUMIN UR-MCNC: 30 MG/DL
ALP SERPL-CCNC: 127 U/L (ref 40–150)
ALT SERPL W P-5'-P-CCNC: 271 U/L (ref 0–70)
ANION GAP SERPL CALCULATED.3IONS-SCNC: 7 MMOL/L (ref 3–14)
APAP SERPL-MCNC: <2 MG/L (ref 10–20)
APPEARANCE UR: ABNORMAL
AST SERPL W P-5'-P-CCNC: 249 U/L (ref 0–45)
BACTERIA #/AREA URNS HPF: ABNORMAL /HPF
BASOPHILS # BLD AUTO: 0 10E9/L (ref 0–0.2)
BASOPHILS NFR BLD AUTO: 0.1 %
BILIRUB SERPL-MCNC: 8.5 MG/DL (ref 0.2–1.3)
BILIRUB UR QL STRIP: ABNORMAL
BUN SERPL-MCNC: 31 MG/DL (ref 7–30)
CALCIUM SERPL-MCNC: 9.7 MG/DL (ref 8.5–10.1)
CHLORIDE SERPL-SCNC: 103 MMOL/L (ref 94–109)
CO2 SERPL-SCNC: 25 MMOL/L (ref 20–32)
COLOR UR AUTO: ABNORMAL
CREAT SERPL-MCNC: 2.36 MG/DL (ref 0.66–1.25)
DIFFERENTIAL METHOD BLD: ABNORMAL
EOSINOPHIL # BLD AUTO: 0 10E9/L (ref 0–0.7)
EOSINOPHIL NFR BLD AUTO: 0 %
ERYTHROCYTE [DISTWIDTH] IN BLOOD BY AUTOMATED COUNT: 15 % (ref 10–15)
GFR SERPL CREATININE-BSD FRML MDRD: 25 ML/MIN/{1.73_M2}
GLUCOSE SERPL-MCNC: 230 MG/DL (ref 70–99)
GLUCOSE UR STRIP-MCNC: 30 MG/DL
GRAN CASTS #/AREA URNS LPF: 8 /LPF
HCT VFR BLD AUTO: 45.5 % (ref 40–53)
HGB BLD-MCNC: 15.2 G/DL (ref 13.3–17.7)
HGB UR QL STRIP: ABNORMAL
HYALINE CASTS #/AREA URNS LPF: 68 /LPF (ref 0–2)
IMM GRANULOCYTES # BLD: 0.1 10E9/L (ref 0–0.4)
IMM GRANULOCYTES NFR BLD: 0.3 %
KETONES UR STRIP-MCNC: 10 MG/DL
LACTATE BLD-SCNC: 2.2 MMOL/L (ref 0.7–2)
LEUKOCYTE ESTERASE UR QL STRIP: ABNORMAL
LIPASE SERPL-CCNC: 121 U/L (ref 73–393)
LYMPHOCYTES # BLD AUTO: 0.4 10E9/L (ref 0.8–5.3)
LYMPHOCYTES NFR BLD AUTO: 1.7 %
MCH RBC QN AUTO: 28.3 PG (ref 26.5–33)
MCHC RBC AUTO-ENTMCNC: 33.4 G/DL (ref 31.5–36.5)
MCV RBC AUTO: 85 FL (ref 78–100)
MIXED CELL CASTS #/AREA URNS LPF: 30 /LPF
MONOCYTES # BLD AUTO: 0.8 10E9/L (ref 0–1.3)
MONOCYTES NFR BLD AUTO: 3.7 %
NEUTROPHILS # BLD AUTO: 19.5 10E9/L (ref 1.6–8.3)
NEUTROPHILS NFR BLD AUTO: 94.2 %
NITRATE UR QL: NEGATIVE
NRBC # BLD AUTO: 0 10*3/UL
NRBC BLD AUTO-RTO: 0 /100
PH UR STRIP: 5.5 PH (ref 5–7)
PLATELET # BLD AUTO: 164 10E9/L (ref 150–450)
POTASSIUM SERPL-SCNC: 5.7 MMOL/L (ref 3.4–5.3)
PROT SERPL-MCNC: 6.3 G/DL (ref 6.8–8.8)
RBC # BLD AUTO: 5.37 10E12/L (ref 4.4–5.9)
RBC #/AREA URNS AUTO: 4 /HPF (ref 0–2)
SODIUM SERPL-SCNC: 135 MMOL/L (ref 133–144)
SOURCE: ABNORMAL
SP GR UR STRIP: 1.02 (ref 1–1.03)
TRANS CELLS #/AREA URNS HPF: 3 /HPF (ref 0–1)
UROBILINOGEN UR STRIP-MCNC: >12 MG/DL (ref 0–2)
WBC # BLD AUTO: 20.7 10E9/L (ref 4–11)
WBC #/AREA URNS AUTO: 30 /HPF (ref 0–5)
WBC CLUMPS #/AREA URNS HPF: PRESENT /HPF

## 2019-03-13 PROCEDURE — 76705 ECHO EXAM OF ABDOMEN: CPT

## 2019-03-13 PROCEDURE — 87186 SC STD MICRODIL/AGAR DIL: CPT | Performed by: EMERGENCY MEDICINE

## 2019-03-13 PROCEDURE — 85025 COMPLETE CBC W/AUTO DIFF WBC: CPT | Performed by: EMERGENCY MEDICINE

## 2019-03-13 PROCEDURE — 99207 ZZC CDG-CHARGE REQUIRED MANUAL ENTRY: CPT | Performed by: INTERNAL MEDICINE

## 2019-03-13 PROCEDURE — 36415 COLL VENOUS BLD VENIPUNCTURE: CPT

## 2019-03-13 PROCEDURE — 87088 URINE BACTERIA CULTURE: CPT | Performed by: EMERGENCY MEDICINE

## 2019-03-13 PROCEDURE — 74176 CT ABD & PELVIS W/O CONTRAST: CPT

## 2019-03-13 PROCEDURE — 87040 BLOOD CULTURE FOR BACTERIA: CPT | Performed by: EMERGENCY MEDICINE

## 2019-03-13 PROCEDURE — 87077 CULTURE AEROBIC IDENTIFY: CPT | Performed by: EMERGENCY MEDICINE

## 2019-03-13 PROCEDURE — 25000128 H RX IP 250 OP 636: Performed by: EMERGENCY MEDICINE

## 2019-03-13 PROCEDURE — 80053 COMPREHEN METABOLIC PANEL: CPT | Performed by: EMERGENCY MEDICINE

## 2019-03-13 PROCEDURE — 87086 URINE CULTURE/COLONY COUNT: CPT | Performed by: EMERGENCY MEDICINE

## 2019-03-13 PROCEDURE — 96365 THER/PROPH/DIAG IV INF INIT: CPT

## 2019-03-13 PROCEDURE — 81001 URINALYSIS AUTO W/SCOPE: CPT | Performed by: EMERGENCY MEDICINE

## 2019-03-13 PROCEDURE — 99285 EMERGENCY DEPT VISIT HI MDM: CPT | Mod: 25

## 2019-03-13 PROCEDURE — 83605 ASSAY OF LACTIC ACID: CPT | Performed by: EMERGENCY MEDICINE

## 2019-03-13 PROCEDURE — 80329 ANALGESICS NON-OPIOID 1 OR 2: CPT | Performed by: EMERGENCY MEDICINE

## 2019-03-13 PROCEDURE — 96361 HYDRATE IV INFUSION ADD-ON: CPT

## 2019-03-13 PROCEDURE — 21000001 ZZH R&B HEART CARE

## 2019-03-13 PROCEDURE — 99223 1ST HOSP IP/OBS HIGH 75: CPT | Mod: AI | Performed by: INTERNAL MEDICINE

## 2019-03-13 PROCEDURE — 87800 DETECT AGNT MULT DNA DIREC: CPT | Performed by: EMERGENCY MEDICINE

## 2019-03-13 PROCEDURE — 83690 ASSAY OF LIPASE: CPT | Performed by: EMERGENCY MEDICINE

## 2019-03-13 RX ORDER — SODIUM CHLORIDE 9 MG/ML
1000 INJECTION, SOLUTION INTRAVENOUS CONTINUOUS
Status: DISCONTINUED | OUTPATIENT
Start: 2019-03-13 | End: 2019-03-14

## 2019-03-13 RX ORDER — PIPERACILLIN SODIUM, TAZOBACTAM SODIUM 3; .375 G/15ML; G/15ML
3.38 INJECTION, POWDER, LYOPHILIZED, FOR SOLUTION INTRAVENOUS ONCE
Status: COMPLETED | OUTPATIENT
Start: 2019-03-13 | End: 2019-03-13

## 2019-03-13 RX ADMIN — SODIUM CHLORIDE 1000 ML: 9 INJECTION, SOLUTION INTRAVENOUS at 21:52

## 2019-03-13 RX ADMIN — SODIUM CHLORIDE 1000 ML: 9 INJECTION, SOLUTION INTRAVENOUS at 20:38

## 2019-03-13 RX ADMIN — PIPERACILLIN SODIUM,TAZOBACTAM SODIUM 3.38 G: 3; .375 INJECTION, POWDER, FOR SOLUTION INTRAVENOUS at 21:55

## 2019-03-13 ASSESSMENT — ENCOUNTER SYMPTOMS
VOMITING: 1
ABDOMINAL DISTENTION: 1
CONSTIPATION: 0
DIARRHEA: 0
FEVER: 0
APPETITE CHANGE: 1
ABDOMINAL PAIN: 1
BLOOD IN STOOL: 0

## 2019-03-13 ASSESSMENT — MIFFLIN-ST. JEOR: SCORE: 1636.18

## 2019-03-13 NOTE — PROGRESS NOTES
SUBJECTIVE:  This 78 year old male presents with mid-abdominal pain and vomiting.  Has underlying atrial fibrillation and HTN.  He is reporting dizziness and lightheadedness with standing.  Overall weakness.    OBJECTIVE:  Initial BP was 85/40.  Repeat 94/56.  Patient states that his normal BP is 130/80.    ASSESSMENT: PLAN:   Relative hypotension in elderly male with underlying heart disease.  Will probably benefit from IV fluids and more advanced cardiac monitoring that can be performed in ER setting.  We have referred him [facility].    Omi Hardy MD

## 2019-03-14 ENCOUNTER — ANESTHESIA EVENT (OUTPATIENT)
Dept: SURGERY | Facility: CLINIC | Age: 79
DRG: 418 | End: 2019-03-14
Payer: COMMERCIAL

## 2019-03-14 ENCOUNTER — ANESTHESIA (OUTPATIENT)
Dept: SURGERY | Facility: CLINIC | Age: 79
DRG: 418 | End: 2019-03-14
Payer: COMMERCIAL

## 2019-03-14 PROBLEM — K71.9 HEPATOTOXICITY: Status: ACTIVE | Noted: 2019-03-14

## 2019-03-14 LAB
ALBUMIN SERPL-MCNC: 2.9 G/DL (ref 3.4–5)
ALP SERPL-CCNC: 104 U/L (ref 40–150)
ALT SERPL W P-5'-P-CCNC: 199 U/L (ref 0–70)
ANION GAP SERPL CALCULATED.3IONS-SCNC: 7 MMOL/L (ref 3–14)
AST SERPL W P-5'-P-CCNC: 137 U/L (ref 0–45)
BILIRUB DIRECT SERPL-MCNC: 6 MG/DL (ref 0–0.2)
BILIRUB SERPL-MCNC: 7.3 MG/DL (ref 0.2–1.3)
BUN SERPL-MCNC: 33 MG/DL (ref 7–30)
CALCIUM SERPL-MCNC: 8.5 MG/DL (ref 8.5–10.1)
CHLORIDE SERPL-SCNC: 107 MMOL/L (ref 94–109)
CO2 SERPL-SCNC: 23 MMOL/L (ref 20–32)
CREAT SERPL-MCNC: 1.93 MG/DL (ref 0.66–1.25)
ERYTHROCYTE [DISTWIDTH] IN BLOOD BY AUTOMATED COUNT: 15.1 % (ref 10–15)
GFR SERPL CREATININE-BSD FRML MDRD: 32 ML/MIN/{1.73_M2}
GLUCOSE BLDC GLUCOMTR-MCNC: 138 MG/DL (ref 70–99)
GLUCOSE BLDC GLUCOMTR-MCNC: 144 MG/DL (ref 70–99)
GLUCOSE BLDC GLUCOMTR-MCNC: 150 MG/DL (ref 70–99)
GLUCOSE BLDC GLUCOMTR-MCNC: 175 MG/DL (ref 70–99)
GLUCOSE BLDC GLUCOMTR-MCNC: 195 MG/DL (ref 70–99)
GLUCOSE BLDC GLUCOMTR-MCNC: 328 MG/DL (ref 70–99)
GLUCOSE SERPL-MCNC: 149 MG/DL (ref 70–99)
HBA1C MFR BLD: 7.4 % (ref 0–5.6)
HCT VFR BLD AUTO: 40 % (ref 40–53)
HGB BLD-MCNC: 13.4 G/DL (ref 13.3–17.7)
INR PPP: 1.37 (ref 0.86–1.14)
MCH RBC QN AUTO: 28.3 PG (ref 26.5–33)
MCHC RBC AUTO-ENTMCNC: 33.5 G/DL (ref 31.5–36.5)
MCV RBC AUTO: 84 FL (ref 78–100)
PLATELET # BLD AUTO: 125 10E9/L (ref 150–450)
POTASSIUM SERPL-SCNC: 4.6 MMOL/L (ref 3.4–5.3)
POTASSIUM SERPL-SCNC: 4.7 MMOL/L (ref 3.4–5.3)
PROT SERPL-MCNC: 5.5 G/DL (ref 6.8–8.8)
RBC # BLD AUTO: 4.74 10E12/L (ref 4.4–5.9)
SODIUM SERPL-SCNC: 137 MMOL/L (ref 133–144)
UPPER EUS: NORMAL
WBC # BLD AUTO: 10.2 10E9/L (ref 4–11)

## 2019-03-14 PROCEDURE — 25000566 ZZH SEVOFLURANE, EA 15 MIN: Performed by: INTERNAL MEDICINE

## 2019-03-14 PROCEDURE — 99221 1ST HOSP IP/OBS SF/LOW 40: CPT | Performed by: SURGERY

## 2019-03-14 PROCEDURE — 85027 COMPLETE CBC AUTOMATED: CPT | Performed by: INTERNAL MEDICINE

## 2019-03-14 PROCEDURE — 99233 SBSQ HOSP IP/OBS HIGH 50: CPT | Performed by: INTERNAL MEDICINE

## 2019-03-14 PROCEDURE — 80048 BASIC METABOLIC PNL TOTAL CA: CPT | Performed by: INTERNAL MEDICINE

## 2019-03-14 PROCEDURE — 25000128 H RX IP 250 OP 636: Performed by: NURSE ANESTHETIST, CERTIFIED REGISTERED

## 2019-03-14 PROCEDURE — 25000128 H RX IP 250 OP 636: Performed by: INTERNAL MEDICINE

## 2019-03-14 PROCEDURE — 36000050 ZZH SURGERY LEVEL 2 1ST 30 MIN: Performed by: INTERNAL MEDICINE

## 2019-03-14 PROCEDURE — 87040 BLOOD CULTURE FOR BACTERIA: CPT | Performed by: INTERNAL MEDICINE

## 2019-03-14 PROCEDURE — 37000009 ZZH ANESTHESIA TECHNICAL FEE, EACH ADDTL 15 MIN: Performed by: INTERNAL MEDICINE

## 2019-03-14 PROCEDURE — 37000008 ZZH ANESTHESIA TECHNICAL FEE, 1ST 30 MIN: Performed by: INTERNAL MEDICINE

## 2019-03-14 PROCEDURE — 36000052 ZZH SURGERY LEVEL 2 EA 15 ADDTL MIN: Performed by: INTERNAL MEDICINE

## 2019-03-14 PROCEDURE — 00000146 ZZHCL STATISTIC GLUCOSE BY METER IP

## 2019-03-14 PROCEDURE — 36415 COLL VENOUS BLD VENIPUNCTURE: CPT | Performed by: INTERNAL MEDICINE

## 2019-03-14 PROCEDURE — 80076 HEPATIC FUNCTION PANEL: CPT | Performed by: INTERNAL MEDICINE

## 2019-03-14 PROCEDURE — 21000001 ZZH R&B HEART CARE

## 2019-03-14 PROCEDURE — 85610 PROTHROMBIN TIME: CPT | Performed by: INTERNAL MEDICINE

## 2019-03-14 PROCEDURE — 71000014 ZZH RECOVERY PHASE 1 LEVEL 2 FIRST HR: Performed by: INTERNAL MEDICINE

## 2019-03-14 PROCEDURE — 71000015 ZZH RECOVERY PHASE 1 LEVEL 2 EA ADDTL HR: Performed by: INTERNAL MEDICINE

## 2019-03-14 PROCEDURE — 25000125 ZZHC RX 250: Performed by: NURSE ANESTHETIST, CERTIFIED REGISTERED

## 2019-03-14 PROCEDURE — 83036 HEMOGLOBIN GLYCOSYLATED A1C: CPT | Performed by: INTERNAL MEDICINE

## 2019-03-14 PROCEDURE — 25800030 ZZH RX IP 258 OP 636: Performed by: NURSE ANESTHETIST, CERTIFIED REGISTERED

## 2019-03-14 PROCEDURE — 40000170 ZZH STATISTIC PRE-PROCEDURE ASSESSMENT II: Performed by: INTERNAL MEDICINE

## 2019-03-14 PROCEDURE — 25000131 ZZH RX MED GY IP 250 OP 636 PS 637: Performed by: INTERNAL MEDICINE

## 2019-03-14 PROCEDURE — 25800030 ZZH RX IP 258 OP 636: Performed by: INTERNAL MEDICINE

## 2019-03-14 PROCEDURE — 25000132 ZZH RX MED GY IP 250 OP 250 PS 637: Performed by: INTERNAL MEDICINE

## 2019-03-14 PROCEDURE — 84132 ASSAY OF SERUM POTASSIUM: CPT | Performed by: INTERNAL MEDICINE

## 2019-03-14 PROCEDURE — 25800030 ZZH RX IP 258 OP 636: Performed by: ANESTHESIOLOGY

## 2019-03-14 PROCEDURE — 0DJ08ZZ INSPECTION OF UPPER INTESTINAL TRACT, VIA NATURAL OR ARTIFICIAL OPENING ENDOSCOPIC: ICD-10-PCS | Performed by: INTERNAL MEDICINE

## 2019-03-14 RX ORDER — NALOXONE HYDROCHLORIDE 0.4 MG/ML
.1-.4 INJECTION, SOLUTION INTRAMUSCULAR; INTRAVENOUS; SUBCUTANEOUS
Status: ACTIVE | OUTPATIENT
Start: 2019-03-14 | End: 2019-03-15

## 2019-03-14 RX ORDER — NITROGLYCERIN 0.4 MG/1
0.4 TABLET SUBLINGUAL EVERY 5 MIN PRN
Status: DISCONTINUED | OUTPATIENT
Start: 2019-03-14 | End: 2019-03-15

## 2019-03-14 RX ORDER — NICOTINE POLACRILEX 4 MG
15-30 LOZENGE BUCCAL
Status: DISCONTINUED | OUTPATIENT
Start: 2019-03-14 | End: 2019-03-18 | Stop reason: HOSPADM

## 2019-03-14 RX ORDER — DEXTROSE MONOHYDRATE 25 G/50ML
25-50 INJECTION, SOLUTION INTRAVENOUS
Status: DISCONTINUED | OUTPATIENT
Start: 2019-03-14 | End: 2019-03-18 | Stop reason: HOSPADM

## 2019-03-14 RX ORDER — ONDANSETRON 4 MG/1
4 TABLET, ORALLY DISINTEGRATING ORAL EVERY 30 MIN PRN
Status: DISCONTINUED | OUTPATIENT
Start: 2019-03-14 | End: 2019-03-14 | Stop reason: HOSPADM

## 2019-03-14 RX ORDER — ONDANSETRON 2 MG/ML
INJECTION INTRAMUSCULAR; INTRAVENOUS PRN
Status: DISCONTINUED | OUTPATIENT
Start: 2019-03-14 | End: 2019-03-14

## 2019-03-14 RX ORDER — AMIODARONE HYDROCHLORIDE 200 MG/1
200 TABLET ORAL EVERY MORNING
Status: DISCONTINUED | OUTPATIENT
Start: 2019-03-14 | End: 2019-03-18 | Stop reason: HOSPADM

## 2019-03-14 RX ORDER — NOREPINEPHRINE BITARTRATE/D5W 16MG/250ML
0.03-0.4 PLASTIC BAG, INJECTION (ML) INTRAVENOUS CONTINUOUS PRN
Status: DISCONTINUED | OUTPATIENT
Start: 2019-03-14 | End: 2019-03-15 | Stop reason: CLARIF

## 2019-03-14 RX ORDER — NITROGLYCERIN 0.4 MG/1
0.4 TABLET SUBLINGUAL EVERY 5 MIN PRN
Status: DISCONTINUED | OUTPATIENT
Start: 2019-03-14 | End: 2019-03-14

## 2019-03-14 RX ORDER — EPHEDRINE SULFATE 50 MG/ML
INJECTION, SOLUTION INTRAMUSCULAR; INTRAVENOUS; SUBCUTANEOUS PRN
Status: DISCONTINUED | OUTPATIENT
Start: 2019-03-14 | End: 2019-03-14

## 2019-03-14 RX ORDER — LIDOCAINE 40 MG/G
CREAM TOPICAL
Status: DISCONTINUED | OUTPATIENT
Start: 2019-03-14 | End: 2019-03-15

## 2019-03-14 RX ORDER — ONDANSETRON 2 MG/ML
4 INJECTION INTRAMUSCULAR; INTRAVENOUS EVERY 30 MIN PRN
Status: DISCONTINUED | OUTPATIENT
Start: 2019-03-14 | End: 2019-03-14 | Stop reason: HOSPADM

## 2019-03-14 RX ORDER — ONDANSETRON 4 MG/1
4 TABLET, ORALLY DISINTEGRATING ORAL EVERY 6 HOURS PRN
Status: DISCONTINUED | OUTPATIENT
Start: 2019-03-14 | End: 2019-03-18 | Stop reason: HOSPADM

## 2019-03-14 RX ORDER — FENTANYL CITRATE 50 UG/ML
INJECTION, SOLUTION INTRAMUSCULAR; INTRAVENOUS PRN
Status: DISCONTINUED | OUTPATIENT
Start: 2019-03-14 | End: 2019-03-14

## 2019-03-14 RX ORDER — PROCHLORPERAZINE MALEATE 5 MG
5 TABLET ORAL EVERY 6 HOURS PRN
Status: DISCONTINUED | OUTPATIENT
Start: 2019-03-14 | End: 2019-03-18 | Stop reason: HOSPADM

## 2019-03-14 RX ORDER — LIDOCAINE 40 MG/G
CREAM TOPICAL
Status: DISCONTINUED | OUTPATIENT
Start: 2019-03-14 | End: 2019-03-14 | Stop reason: HOSPADM

## 2019-03-14 RX ORDER — FLUTICASONE PROPIONATE 50 MCG
1 SPRAY, SUSPENSION (ML) NASAL DAILY
Status: DISCONTINUED | OUTPATIENT
Start: 2019-03-14 | End: 2019-03-18 | Stop reason: HOSPADM

## 2019-03-14 RX ORDER — SODIUM CHLORIDE, SODIUM LACTATE, POTASSIUM CHLORIDE, CALCIUM CHLORIDE 600; 310; 30; 20 MG/100ML; MG/100ML; MG/100ML; MG/100ML
INJECTION, SOLUTION INTRAVENOUS CONTINUOUS
Status: DISCONTINUED | OUTPATIENT
Start: 2019-03-14 | End: 2019-03-14 | Stop reason: HOSPADM

## 2019-03-14 RX ORDER — FENTANYL CITRATE 50 UG/ML
25-50 INJECTION, SOLUTION INTRAMUSCULAR; INTRAVENOUS
Status: DISCONTINUED | OUTPATIENT
Start: 2019-03-14 | End: 2019-03-14 | Stop reason: HOSPADM

## 2019-03-14 RX ORDER — SODIUM CHLORIDE 9 MG/ML
INJECTION, SOLUTION INTRAVENOUS CONTINUOUS
Status: DISCONTINUED | OUTPATIENT
Start: 2019-03-14 | End: 2019-03-18

## 2019-03-14 RX ORDER — NALOXONE HYDROCHLORIDE 0.4 MG/ML
.1-.4 INJECTION, SOLUTION INTRAMUSCULAR; INTRAVENOUS; SUBCUTANEOUS
Status: DISCONTINUED | OUTPATIENT
Start: 2019-03-14 | End: 2019-03-14

## 2019-03-14 RX ORDER — HYDROMORPHONE HYDROCHLORIDE 1 MG/ML
.3-.5 INJECTION, SOLUTION INTRAMUSCULAR; INTRAVENOUS; SUBCUTANEOUS EVERY 5 MIN PRN
Status: DISCONTINUED | OUTPATIENT
Start: 2019-03-14 | End: 2019-03-14 | Stop reason: HOSPADM

## 2019-03-14 RX ORDER — HYDROMORPHONE HYDROCHLORIDE 1 MG/ML
0.2 INJECTION, SOLUTION INTRAMUSCULAR; INTRAVENOUS; SUBCUTANEOUS
Status: DISCONTINUED | OUTPATIENT
Start: 2019-03-14 | End: 2019-03-18 | Stop reason: HOSPADM

## 2019-03-14 RX ORDER — PROPOFOL 10 MG/ML
INJECTION, EMULSION INTRAVENOUS PRN
Status: DISCONTINUED | OUTPATIENT
Start: 2019-03-14 | End: 2019-03-14

## 2019-03-14 RX ORDER — PIPERACILLIN SODIUM, TAZOBACTAM SODIUM 2; .25 G/10ML; G/10ML
2.25 INJECTION, POWDER, LYOPHILIZED, FOR SOLUTION INTRAVENOUS EVERY 6 HOURS
Status: DISCONTINUED | OUTPATIENT
Start: 2019-03-14 | End: 2019-03-15

## 2019-03-14 RX ORDER — LIDOCAINE HYDROCHLORIDE 20 MG/ML
INJECTION, SOLUTION INFILTRATION; PERINEURAL PRN
Status: DISCONTINUED | OUTPATIENT
Start: 2019-03-14 | End: 2019-03-14

## 2019-03-14 RX ORDER — ONDANSETRON 2 MG/ML
4 INJECTION INTRAMUSCULAR; INTRAVENOUS EVERY 6 HOURS PRN
Status: DISCONTINUED | OUTPATIENT
Start: 2019-03-14 | End: 2019-03-18 | Stop reason: HOSPADM

## 2019-03-14 RX ORDER — DEXAMETHASONE SODIUM PHOSPHATE 4 MG/ML
INJECTION, SOLUTION INTRA-ARTICULAR; INTRALESIONAL; INTRAMUSCULAR; INTRAVENOUS; SOFT TISSUE PRN
Status: DISCONTINUED | OUTPATIENT
Start: 2019-03-14 | End: 2019-03-14

## 2019-03-14 RX ORDER — PROCHLORPERAZINE 25 MG
12.5 SUPPOSITORY, RECTAL RECTAL EVERY 12 HOURS PRN
Status: DISCONTINUED | OUTPATIENT
Start: 2019-03-14 | End: 2019-03-18 | Stop reason: HOSPADM

## 2019-03-14 RX ORDER — INDOMETHACIN 50 MG/1
100 SUPPOSITORY RECTAL
Status: DISCONTINUED | OUTPATIENT
Start: 2019-03-14 | End: 2019-03-14 | Stop reason: HOSPADM

## 2019-03-14 RX ADMIN — APIXABAN 5 MG: 5 TABLET, FILM COATED ORAL at 20:14

## 2019-03-14 RX ADMIN — PIPERACILLIN AND TAZOBACTAM 2.25 G: 2; .25 INJECTION, POWDER, FOR SOLUTION INTRAVENOUS at 23:00

## 2019-03-14 RX ADMIN — AMIODARONE HYDROCHLORIDE 200 MG: 200 TABLET ORAL at 18:51

## 2019-03-14 RX ADMIN — PHENYLEPHRINE HYDROCHLORIDE 200 MCG: 10 INJECTION, SOLUTION INTRAMUSCULAR; INTRAVENOUS; SUBCUTANEOUS at 14:15

## 2019-03-14 RX ADMIN — Medication 5 MG: at 14:35

## 2019-03-14 RX ADMIN — PHENYLEPHRINE HYDROCHLORIDE 200 MCG: 10 INJECTION, SOLUTION INTRAMUSCULAR; INTRAVENOUS; SUBCUTANEOUS at 14:40

## 2019-03-14 RX ADMIN — PHENYLEPHRINE HYDROCHLORIDE 200 MCG: 10 INJECTION, SOLUTION INTRAMUSCULAR; INTRAVENOUS; SUBCUTANEOUS at 14:31

## 2019-03-14 RX ADMIN — Medication 5 MG: at 14:15

## 2019-03-14 RX ADMIN — PIPERACILLIN AND TAZOBACTAM 2.25 G: 2; .25 INJECTION, POWDER, FOR SOLUTION INTRAVENOUS at 10:45

## 2019-03-14 RX ADMIN — PROPOFOL 200 MG: 10 INJECTION, EMULSION INTRAVENOUS at 14:11

## 2019-03-14 RX ADMIN — SUCCINYLCHOLINE CHLORIDE 100 MG: 20 INJECTION, SOLUTION INTRAMUSCULAR; INTRAVENOUS; PARENTERAL at 14:11

## 2019-03-14 RX ADMIN — DEXAMETHASONE SODIUM PHOSPHATE 4 MG: 4 INJECTION, SOLUTION INTRA-ARTICULAR; INTRALESIONAL; INTRAMUSCULAR; INTRAVENOUS; SOFT TISSUE at 14:17

## 2019-03-14 RX ADMIN — SODIUM CHLORIDE, POTASSIUM CHLORIDE, SODIUM LACTATE AND CALCIUM CHLORIDE: 600; 310; 30; 20 INJECTION, SOLUTION INTRAVENOUS at 13:58

## 2019-03-14 RX ADMIN — Medication 5 MG: at 14:26

## 2019-03-14 RX ADMIN — LIDOCAINE HYDROCHLORIDE 60 MG: 20 INJECTION, SOLUTION INFILTRATION; PERINEURAL at 14:11

## 2019-03-14 RX ADMIN — INSULIN DETEMIR 15 UNITS: 100 INJECTION, SOLUTION SUBCUTANEOUS at 21:36

## 2019-03-14 RX ADMIN — RANITIDINE 150 MG: 150 TABLET ORAL at 18:51

## 2019-03-14 RX ADMIN — SODIUM CHLORIDE: 9 INJECTION, SOLUTION INTRAVENOUS at 01:58

## 2019-03-14 RX ADMIN — PIPERACILLIN AND TAZOBACTAM 2.25 G: 2; .25 INJECTION, POWDER, FOR SOLUTION INTRAVENOUS at 05:01

## 2019-03-14 RX ADMIN — PHENYLEPHRINE HYDROCHLORIDE 200 MCG: 10 INJECTION, SOLUTION INTRAMUSCULAR; INTRAVENOUS; SUBCUTANEOUS at 14:34

## 2019-03-14 RX ADMIN — ONDANSETRON 4 MG: 2 INJECTION INTRAMUSCULAR; INTRAVENOUS at 14:15

## 2019-03-14 RX ADMIN — Medication 5 MG: at 14:20

## 2019-03-14 RX ADMIN — INSULIN DETEMIR 15 UNITS: 100 INJECTION, SOLUTION SUBCUTANEOUS at 02:30

## 2019-03-14 RX ADMIN — Medication 5 MG: at 14:30

## 2019-03-14 RX ADMIN — FENTANYL CITRATE 50 MCG: 50 INJECTION, SOLUTION INTRAMUSCULAR; INTRAVENOUS at 14:11

## 2019-03-14 RX ADMIN — INSULIN ASPART 3 UNITS: 100 INJECTION, SOLUTION INTRAVENOUS; SUBCUTANEOUS at 21:35

## 2019-03-14 RX ADMIN — PIPERACILLIN AND TAZOBACTAM 2.25 G: 2; .25 INJECTION, POWDER, FOR SOLUTION INTRAVENOUS at 18:47

## 2019-03-14 RX ADMIN — FENTANYL CITRATE 50 MCG: 50 INJECTION, SOLUTION INTRAMUSCULAR; INTRAVENOUS at 14:21

## 2019-03-14 RX ADMIN — PHENYLEPHRINE HYDROCHLORIDE 200 MCG: 10 INJECTION, SOLUTION INTRAMUSCULAR; INTRAVENOUS; SUBCUTANEOUS at 14:11

## 2019-03-14 RX ADMIN — PHENYLEPHRINE HYDROCHLORIDE 200 MCG: 10 INJECTION, SOLUTION INTRAMUSCULAR; INTRAVENOUS; SUBCUTANEOUS at 14:21

## 2019-03-14 RX ADMIN — SODIUM CHLORIDE: 9 INJECTION, SOLUTION INTRAVENOUS at 20:16

## 2019-03-14 RX ADMIN — PHENYLEPHRINE HYDROCHLORIDE 200 MCG: 10 INJECTION, SOLUTION INTRAMUSCULAR; INTRAVENOUS; SUBCUTANEOUS at 14:26

## 2019-03-14 ASSESSMENT — ACTIVITIES OF DAILY LIVING (ADL)
ADLS_ACUITY_SCORE: 24

## 2019-03-14 ASSESSMENT — ENCOUNTER SYMPTOMS: DYSRHYTHMIAS: 1

## 2019-03-14 NOTE — SIGNIFICANT EVENT
Significant Event Note    Xcover:    Start clears per GI recs post procedure after EUS    Demetrius Irizarry DO

## 2019-03-14 NOTE — OR NURSING
Dr. Winslow, [facility], aware of hypotension, 83/48.  HR 80.  MAP 68.  Ok to give 500ml of LR bolus.

## 2019-03-14 NOTE — CONSULTS
"[facility]  Gastroenterology Consultation    Pascual Perea  [address] [address]  78 year old male    Admission Date/Time: 3/13/2019  Primary Care Provider: Andrew Elizalde    We were asked to see the patient in consultation by Dr. Spence for evaluation of acute liver injury.        HPI:  Pascual Perea is a 78 year old male who has a past medical history of type 2 diabetes, coronary artery disease, atrial flutter, hypothyroidism who presents to the hospital for evaluation of abdominal pain.    The patient developed lower abdominal discomfort around 2 am 3/13.  He reports concomitant loss of appetite, nausea, vomiting after eating cereal.  He also reports multiple loose stools.  Denies hematochezia or melena.  He denies fever, but does report chills.      On admission, total bilirubin 8.5, , , .  WBC 10.2.  Hemoglobin 13.4.      US showed a contracted gallbladder, small mobile nonshadowing echogenic focus within the gallbladder most consistent with a sludge ball.  CT A/P again showed a contracted gallbladder, no biliary dilatation noted.    This morning he continues to have intermittent episodes of lower and upper abdominal pain.  He denies nausea.    ROS: A comprehensive ten point review of systems was negative aside from those in mentioned in the HPI.      MEDICATIONS:   No current facility-administered medications on file prior to encounter.   Current Outpatient Medications on File Prior to Encounter:  amiodarone (PACERONE/CODARONE) 200 MG tablet Take 1 tablet (200 mg) by mouth every morning And 2 tablets (400 mg) every evening for 2 weeks then 1 tablet every morning   apixaban ANTICOAGULANT (ELIQUIS) 5 MG tablet Take 1 tablet (5 mg) by mouth 2 times daily   B-D LUER-FELICITAS SYRINGE 22G X 1-1/2\" 3 ML MISC USE WITH TESTOSTERONE EVERY 2 WEEKS AS DIRECTED   blood glucose monitoring (ONE TOUCH ULTRA) test strip Test twice daily with One Touch Ultra " "Blue   Cholecalciferol (VITAMIN D3 PO) Take 5,000 Units by mouth daily   cyanocolbalamin (VITAMIN  B-12) 1000 MCG tablet Take 5,000 mcg by mouth daily.   ferrous fumarate 65 mg, Ruby. FE,-Vitamin C 125 mg (VITRON-C)  MG TABS tablet 1 tab twice a day for anemia   fexofenadine (ALLEGRA) 180 MG tablet Take 1 tablet (180 mg) by mouth daily   fluticasone (FLONASE) 50 MCG/ACT spray USE ONE TO TWO SPRAYS IN EACH NOSTRIL EVERY DAY   gemfibrozil (LOPID) 600 MG tablet Take 1 tablet (600 mg) by mouth 2 times daily   GLUCOSE 4 GM OR CHEW as directed   insulin aspart (NOVOLOG FLEXPEN) 100 UNIT/ML injection Inject 3 times daily (before meals). 2 units per carb choice (approx 5-10 units/meal)   insulin detemir (LEVEMIR FLEXPEN/FLEXTOUCH) 100 UNIT/ML pen Inject 35-40 Units Subcutaneous At Bedtime   INSUPEN ULTRAFIN 30G X 8 MM USE FOUR TIMES A DAY   levothyroxine (SYNTHROID/LEVOTHROID) 125 MCG tablet TAKE ONE TABLET BY MOUTH EVERY DAY   lisinopril (PRINIVIL,ZESTRIL) 30 MG tablet Take 1 tablet (30 mg) by mouth daily   metFORMIN (GLUCOPHAGE) 1000 MG tablet TAKE ONE TABLET BY MOUTH TWO TIMES A DAY WITH MEALS   nitroglycerin (NITROSTAT) 0.4 MG SL tablet Place 1 tablet (0.4 mg) under the tongue every 5 minutes as needed for chest pain   ONETOUCH DELICA LANCETS 33G MISC 1 Device 3 times daily . Use to test blood sugars 3 times daily or as directed.   order for DME Equipment being ordered: 3ml syringes and 22G 1 1/2\" needles to use with testosterone inj every 2 weeks   pravastatin (PRAVACHOL) 80 MG tablet TAKE ONE TABLET BY MOUTH EVERY NIGHT AT BEDTIME   ranitidine (ZANTAC) 150 MG tablet TAKE ONE TABLET BY MOUTH TWICE A DAY   testosterone cypionate (DEPOTESTOSTERONE) 200 MG/ML injection Inject 1.5 mLs (300 mg) into the muscle every 14 days       ALLERGIES:   Allergies   Allergen Reactions     Omeprazole      diarrhea       Past Medical History:   Diagnosis Date     Adhesive capsulitis of shoulder      Anemia 3/10/2014     Anemia, " unspecified      Antiplatelet or antithrombotic long-term use      Arrhythmia     Atrial fib/flutter     CAD (coronary artery disease)      History of cardioversion 04/24/2018    a single synchronized shock of 120 joules restored normal sinus rhythm     History of cardioversion 02/13/2019    single shock , 200 joules successul in restoring NSR     Hyperlipidemia LDL goal <70 5/26/2011     Hypertension      Hypothyroidism      Impotence of organic origin      Laceration of finger  June 2010     left thumb s/p sutures     Numbness and tingling     diabetic neuropathy bilateral feet     BRANDON (obstructive sleep apnea)     intolerant of CPAP, uses it occasionally.  Using mandibular device occasionally     Osteopenia      Pulmonary hypertension (H) 4/6/2012     Sensorineural hearing loss, unspecified      Stented coronary artery 1997    CABG      Testosterone deficiency      Type 2 diabetes mellitus without complication  (goal A1C<8) 10/24/2015     Typical atrial flutter (H) 4/18/16     Unspecified hypothyroidism      Vitamin D deficiency 9/3/2011       Past Surgical History:   Procedure Laterality Date     ANESTHESIA CARDIOVERSION N/A 2/13/2019    Procedure: ANESTHESIA CARDIOVERSION;  Surgeon: GENERIC ANESTHESIA PROVIDER;  Location:  OR     C NONSPECIFIC PROCEDURE  1995    CABG ([facility])     C NONSPECIFIC PROCEDURE  8/01    stress echo     CARDIAC SURGERY      bypass in 1997     CARDIOVERSION  04/24/2018     COLONOSCOPY       CORONARY ANGIOGRAPHY ADULT ORDER      4/2/2012     CORONARY ARTERY BYPASS  1997    [facility] to LAD     EXCISE MASS HEAD Left 8/18/2016    Procedure: EXCISE MASS HEAD;  Surgeon: Ivan Hernandez MD;  Location: [facility]     HEART CATH, ANGIOPLASTY  4/2012     PHACOEMULSIFICATION CLEAR CORNEA WITH STANDARD INTRAOCULAR LENS IMPLANT Left 6/8/2015    Procedure: PHACOEMULSIFICATION CLEAR CORNEA WITH STANDARD INTRAOCULAR LENS IMPLANT;  Surgeon: Nixon Farnsworth MD;  Location: [facility]  "    PHACOEMULSIFICATION CLEAR CORNEA WITH STANDARD INTRAOCULAR LENS IMPLANT Right 2015    Procedure: PHACOEMULSIFICATION CLEAR CORNEA WITH STANDARD INTRAOCULAR LENS IMPLANT;  Surgeon: Nixon Farnsworth MD;  Location:  [facility]     SEPTOPLASTY, TURBINOPLASTY, COMBINED Bilateral 2016    Procedure: COMBINED SEPTOPLASTY, TURBINOPLASTY;  Surgeon: Ivan Hernandez MD;  Location: [facility]         SOCIAL HISTORY:  Social History     Tobacco Use     Smoking status: Former Smoker     Packs/day: 1.00     Years: 6.00     Pack years: 6.00     Last attempt to quit: 1964     Years since quittin.7     Smokeless tobacco: Never Used     Tobacco comment: quit    Substance Use Topics     Alcohol use: No     Drug use: No       FAMILY HISTORY:  Family History   Problem Relation Age of Onset     Genitourinary Problems Father         d: age 89; ARF     Hypertension Father      Diabetes Mother         d: age 68, CAD     Heart Disease Mother         MI     Myocardial Infarction Mother      Cardiovascular Brother         d:  age 31; CAD     Heart Disease Brother         age 31, MI     Diabetes Sister         b:  1939; DM2       PHYSICAL EXAM:   /52 (BP Location: Right arm)   Pulse 77   Temp 99.9  F (37.7  C) (Oral)   Resp 16   Ht 1.803 m (5' 11\")   Wt 88.9 kg (196 lb)   SpO2 95%   BMI 27.34 kg/m      Constitutional: NAD, comfortable  Cardiovascular: RRR, normal S1 and S2, no r/c/g/m  Respiratory: CTAB  Psychiatric: mentation appears normal and affect normal  Head: Normocephalic. Atraumatic.    Neck: Neck supple. No adenopathy. Thyroid symmetric, normal size, trachea midline  Eyes:  PERRL, no icterus  ENT: Hearing adequate, pharynx normal without erythema or exudate  Abdomen:   Auscultation: + BS  Appearance: non-distended  Palpation: non-tender  NEURO: grossly negative  SKIN: no suspicious lesions or rashes  LYMPH:   anterior cervical: no adenopathy  posterior cervical: no adenopathy  supraclavicular: no " adenopathy          ADDITIONAL COMMENTS:   I reviewed the patient's new clinical lab test results.   Recent Labs   Lab Test 03/14/19 0719 03/13/19 2020 08/13/18  0849  03/30/12 2005   WBC 10.2 20.7* 4.3   < > 7.5   HGB 13.4 15.2 15.6   < > 14.0   MCV 84 85 88   < > 83   * 164 182   < > 194   INR  --   --   --   --  0.94    < > = values in this interval not displayed.     Recent Labs   Lab Test 03/14/19 0719 03/14/19  0152 03/13/19 2020 08/13/18  0849     --  135  --  141   POTASSIUM 4.6 4.7 5.7*   < > 4.3   CHLORIDE 107  --  103  --  105   CO2 23  --  25  --  30   BUN 33*  --  31*  --  15   CR 1.93*  --  2.36*  --  0.97   ANIONGAP 7  --  7  --  6   STARR 8.5  --  9.7  --  9.0   *  --  230*  --  97    < > = values in this interval not displayed.     Recent Labs   Lab Test 03/14/19 0719 03/13/19  2210 03/13/19 2020 08/13/18  0849  01/18/16  0831  03/30/12 2005   ALBUMIN 2.9*  --  3.5 4.1   < > 3.7   < > 4.1   BILITOTAL 7.3*  --  8.5* 1.1   < > 0.7   < > 0.5   DBIL 6.0*  --   --   --   --  0.2  --   --    *  --  271* 21   < > 22   < > 29   *  --  249* 18   < > 18   < > 28   ALKPHOS 104  --  127 58   < > 75   < > 52   PROTEIN  --  30*  --   --   --   --   --   --    LIPASE  --   --  121  --   --   --   --  70    < > = values in this interval not displayed.             .    CONSULTATION ASSESSMENT AND PLAN:      79 yo male with a history of coronary artery disease, type 2 diabetes who presents with sudden onset of lower abdominal pain yesterday, nausea, vomiting.  Blood work significant for elevated bilirubin, transaminases, improving today.  US shows a contracted gallbladder and sludge ball in gallbladder.  CT A/P unremarkable, no biliary dilatation.  Concern for possible biliary obstruction due to retained stone/sludge.    -  NPO.  -  EUS +/- ERCP to rule out obstruction    I discussed the patient's findings and plan with Dr. Torres.          Ana Garcia, [facility]  [place]  "Gastroenterology  Office:  [phone] call if needed after 5PM  Cell:  [phone], not available after 5PM at this number    Staff addendum: 78 yom with anemia and CAD admitted with abd pain and elevated liver tests. U/S showed GB sludge but no CBD dilation. Liver tests improved today. Blood cx showed GNR.    BP 99/52   Pulse 77   Temp 99.4  F (37.4  C)   Resp 20   Ht 1.803 m (5' 11\")   Wt 88.9 kg (196 lb)   SpO2 95%   BMI 27.34 kg/m    Gen: awake alert NAD  Cor: RRR  Abd: S NT ND     A/P: 78 yom with suspected CBD stone/cholangitis  EUS possible ERCP today  Continue Abx    Ju Torres MD  [place] [facility]  Pager [phone]  Office [phone]      "

## 2019-03-14 NOTE — PLAN OF CARE
Low degree fever  99.4 , complained of mild pain on epigastric area, liver is tender. denied any nausea or vomiting, abdomen is soft.  Lung sounds clear, , Potassium going down from 5.7 to 4.7.   Urine is dark brown, voiding good amounts. Diabetic blood sugar 150 and 144.   Had several critical values , blood positive cultures, drawn yesterday gram negative rods and Ecoli.  On antibiotics.      Went down to have an ERCP.

## 2019-03-14 NOTE — ANESTHESIA POSTPROCEDURE EVALUATION
Patient: Pascual Perea    Procedure(s):  ENDOSCOPIC ULTRASOUND OF UPPER GASTROINTESTINAL TRACT    Diagnosis:unknown  Diagnosis Additional Information: No value filed.    Anesthesia Type:  General, ETT    Note:  Anesthesia Post Evaluation    Patient location during evaluation: PACU  Patient participation: Able to fully participate in evaluation  Level of consciousness: awake and alert  Pain management: adequate  Airway patency: patent  Cardiovascular status: acceptable and hypotensive  Respiratory status: acceptable  Hydration status: acceptable  PONV: none     Anesthetic complications: None    Comments: Received fluid bolus for borderline low BP in PACU (pressures in 80s-90s). Pressures remain in 90s (mid-upper 60s MAPS). Spoke with his hospitalist, Dr. Benoit, and decided to send to ICU for further monitoring. Signed out to intensivist.         Last vitals:  Vitals:    03/14/19 1530 03/14/19 1540 03/14/19 1545   BP: (!) 84/51 91/51 95/53   Pulse: 75 65 74   Resp: 18 28    Temp: 37.6  C (99.7  F) 37.5  C (99.5  F)    SpO2: 100% 100%          Electronically Signed By: Gabriel Winslow MD  March 14, 2019  4:06 PM

## 2019-03-14 NOTE — OR NURSING
Notified Dr. Hallman, hospitalist, levaphed gtt never started.  BP now 106/61, map 77.  Ok to send pt IMC overnight, instead of ICU

## 2019-03-14 NOTE — PROGRESS NOTES
Microbiology called to report a positive blood culture drawn yesterday at 20:35 pm from left arm and the results are Gram negative rods, Dr. Buitrago was informed.

## 2019-03-14 NOTE — PROGRESS NOTES
[facility]    Hospitalist Progress Note    Brief Summary:   Mr. Perea is a 78-year-old male with a past medical history significant for diabetes, coronary artery disease, atrial flutter, hypothyroidism and hypertension who presents to the Emergency Department with nausea, vomiting and abdominal pain.       Assessment & Plan     1.    Possible acute cholangitis with possible choledocholithiasis with obstructed jaundice  Presented with nausea vomiting abdominal pain, leukocytosis and jaundice.  Have mild right-sided upper quadrant tenderness  Cultures growing 2 out of 2 gram-negative rods.  He is on IV Zosyn we will continue with that.  GI is consulted and the patient is scheduled for ERCP and endoscopic ultrasound today.  I think the patient will need ERCP at this time.    2.  Gram-negative bacteremia  Blood cultures obtained on admission are positive 2 out of 2 for gram-negative rods.  He is on IV Zosyn that will cover it I will continue with that.  I will repeat the blood cultures today    3.  Acute kidney injury:   Patient has normal baseline creatinine 0.8-0.9  This acute renal failure most likely because of the acute infection, poor oral intake.   Will continue with IV fluids at this time, continue to hold lisinopril.  Creatinine is trending down at this time.  Is making good urine    4.  Mild hyperkalemia in the setting of acute kidney injury.    Now resolved continue to hold lisinopril at this time continue with IV fluids at this time.      5.  Type 2 diabetes:   He is n.p.o. at this time, continue with the Levemir at lower dose of 15 units keep him on sliding scale insulin for correction hypoglycemia protocol.  Adjust his Levemir and insulin once he started eating.    6.  Hypothyroidism:   On levothyroxine will continue with that.     7.  Atrial flutter:  I will restart his amiodarone 20 mg daily, restart his Eliquis from this evening after the ERCP.  Rate controlled at this time.    8.   Dyslipidemia:  Hold his PTA regimen for now given the acute liver injury.     9.  Deep venous thrombosis prophylaxis:  He appears to be on Eliquis           DVT Prophylaxis: Eliquis  Code Status: Full Code    Disposition: Expected discharge in 2-3 days once stable.    Repeat blood cultures x2 today  ERCP today  Start restart Eliquis from this evening  Restart amiodarone for his A. Fib      Ford Hallman MD  Text Page  (7am - 6pm)    Interval History   Patient has some mild pain at the right upper quadrant, denies any nausea vomiting fever  chest pain or diarrhea at this time.  Does have some chills though.    No other significant event overnight    -Data reviewed today: I reviewed all new labs and imaging results over the last 24 hours. I personally reviewed no images or EKG's today.    Physical Exam   Temp: 99.4  F (37.4  C) Temp src: Oral BP: 99/52 Pulse: 77 Heart Rate: 56 Resp: 20 SpO2: 95 % O2 Device: None (Room air)    Vitals:    03/13/19 1938   Weight: 88.9 kg (196 lb)     Vital Signs with Ranges  Temp:  [97.6  F (36.4  C)-99.9  F (37.7  C)] 99.4  F (37.4  C)  Pulse:  [63-77] 77  Heart Rate:  [56-70] 56  Resp:  [16-20] 20  BP: ()/(43-59) 99/52  SpO2:  [94 %-98 %] 95 %  I/O last 3 completed shifts:  In: 100 [P.O.:100]  Out: 575 [Urine:575]    Constitutional: awake, alert, cooperative, no apparent distress, and appears stated age  Eyes: icteric bilaterally  Respiratory: No increased work of breathing, good air exchange, clear to auscultation bilaterally, no crackles or wheezing  Cardiovascular: Normal apical impulse, regular rate and rhythm, normal S1 and S2, no S3 or S4, and no murmur noted  GI: No scars, normal bowel sounds, soft, non-distended, non-tender, no masses palpated, no hepatosplenomegally  Skin: no bruising or bleeding, mild jaundice  Musculoskeletal: Trace lower extremity pitting edema present  Neurologic: No focal deficit  Medications     lactated ringers       - MEDICATION INSTRUCTIONS -        sodium chloride 100 mL/hr at 03/14/19 0158       [Auto Hold] insulin aspart  1-7 Units Subcutaneous TID AC     [Auto Hold] insulin aspart  1-5 Units Subcutaneous At Bedtime     [Auto Hold] insulin detemir  15 Units Subcutaneous At Bedtime     [Auto Hold] piperacillin-tazobactam  2.25 g Intravenous Q6H     [Auto Hold] sodium chloride (PF)  3 mL Intracatheter Q8H     sodium chloride (PF)  3 mL Intracatheter Q8H       Data   Recent Labs   Lab 03/14/19  0847 03/14/19  0719 03/14/19  0152 03/13/19 2020   WBC  --  10.2  --  20.7*   HGB  --  13.4  --  15.2   MCV  --  84  --  85   PLT  --  125*  --  164   INR 1.37*  --   --   --    NA  --  137  --  135   POTASSIUM  --  4.6 4.7 5.7*   CHLORIDE  --  107  --  103   CO2  --  23  --  25   BUN  --  33*  --  31*   CR  --  1.93*  --  2.36*   ANIONGAP  --  7  --  7   STARR  --  8.5  --  9.7   GLC  --  149*  --  230*   ALBUMIN  --  2.9*  --  3.5   PROTTOTAL  --  5.5*  --  6.3*   BILITOTAL  --  7.3*  --  8.5*   ALKPHOS  --  104  --  127   ALT  --  199*  --  271*   AST  --  137*  --  249*   LIPASE  --   --   --  121       Recent Results (from the past 24 hour(s))   Abdomen US, limited (RUQ only)    Narrative    RIGHT UPPER QUADRANT ULTRASOUND 3/13/2019 9:34 PM    HISTORY:  pain, pain    COMPARISON: None.    FINDINGS:    Gallbladder: The gallbladder is contracted. The wall of the  gallbladder is thickened because the gallbladder is contracted. It  measures 0.6 cm in thickness. No gallstones are seen but there is a  small nonshadowing multiple sludge ball within the gallbladder.. The  patient is not focally tender    Bile ducts:   CHD is normal diameter.  No intrahepatic biliary  dilatation.    Liver:   Normal.     Pancreas:  Partially obscured by gas. Visualized portions are  negative.    Right kidney:   Normal.       Impression    IMPRESSION:    1. Contracted gallbladder.  2. Small mobile nonshadowing echogenic focus within the gallbladder.  This is most compatible with a sludge  ball.    SHANNON RODRIGUEZ MD   CT Abdomen Pelvis w/o Contrast    Narrative    CT ABDOMEN AND PELVIS WITHOUT CONTRAST 3/13/2019 10:06 PM     HISTORY: Abdominal pain and vomiting. Negative US. Bili >8, elevated  lactate, WBC 20.    COMPARISON: None.    TECHNIQUE: Axial CT images of the abdomen and pelvis from the  diaphragm to the symphysis pubis were acquired without IV contrast.  Radiation dose for this scan was reduced using automated exposure  control, adjustment of the mA and/or kV according to patient size, or  iterative reconstruction technique.    FINDINGS: There are no stones seen within either kidney, either  ureter, or the bladder. There is no hydroureter or hydronephrosis.  There is no perinephric fat stranding. Kidneys are normal in size and  configuration.  There are moderate atherosclerotic changes of the  visualized aorta and its branches. There is no evidence of aortic  aneurysm. There are no dilated loops of small intestine or large bowel  to suggest ileus or obstruction. No free air or free fluid. No  diverticulitis. Prostatomegaly. Unremarkable appendix. Contracted  gallbladder. Unremarkable liver. No biliary dilatation demonstrated.  No splenomegaly.  No definite adrenal nodules.  Pancreas grossly  unremarkable. The remainder of the visualized abdomen is unremarkable  on this noncontrast scan. Survey of the visualized bony structures  demonstrates no destructive bony lesions.  The visualized lung bases  are unremarkable.       Impression    IMPRESSION: No acute process demonstrated in the abdomen and pelvis.    TONIE WILKS MD

## 2019-03-14 NOTE — ED NOTES
"[facility]  ED Nurse Handoff Report    ED Chief complaint: Abdominal Pain (patient here with abdominal pain started today)      ED Diagnosis:   Final diagnoses:   Abdominal pain       Code Status: Full Code    Allergies:   Allergies   Allergen Reactions     Omeprazole      diarrhea       Activity level - Baseline/Home:  Independent    Activity Level - Current:   Stand with Assist     Needed?: No    Isolation: No  Infection: Not Applicable  Bariatric?: No    Vital Signs:   Vitals:    03/13/19 2100 03/13/19 2150 03/13/19 2213 03/13/19 2350   BP: 94/43 (!) 89/57  108/50   Pulse: 77 73     Temp:   98  F (36.7  C)    TempSrc:   Oral    SpO2: 96%   94%   Weight:       Height:           Cardiac Rhythm: ,        Pain level: 0-10 Pain Scale: 2    Is this patient confused?: No   Does this patient have a guardian?  No         If yes, is there guardianship documents in the Epic \"Code/ACP\" activity?  N/A         Guardian Notified?  N/A  [place] - Suicide Severity Rating Scale Completed?  Yes  If yes, what color did the patient score?  White    Patient Report: Initial Complaint: Abd pain. Pt c/o pain since last night when he woke up with right sided abd pain. Patient states he vomited x1 and has been feeling tired/weak since that time. Pain with palpation to right side by MD; denies blood in stool or urine. Patient present jaundiced, hypotensive, and generalized weakness. Stands independently. Wife at bedside; wife is confused/has dementia.   Focused Assessment:   Cards: hypotensive, 2 bags of fluids given for lactic 2.2  Resp: WNL  Neuro: WNL  GI: abd pain to right side, US and CT completed  : UA sent/culture, patient's urine dark jay in color, denies urinary sypmtoms    Tests Performed: US, CT, labs, blood cultures  Abnormal Results: see Xrays  Treatments provided: IV fluids bolus x2, ABX, US, CT    Family Comments: Wife, Shanon at bedside, has hx of dementia. Patient called his daughter with " status update.     OBS brochure/video discussed/provided to patient/family: No              Name of person given brochure if not patient: n/a              Relationship to patient: n/a    ED Medications:   Medications   0.9% sodium chloride BOLUS (0 mLs Intravenous Stopped 3/13/19 2253)     Followed by   sodium chloride 0.9% infusion (not administered)   0.9% sodium chloride BOLUS (0 mLs Intravenous Stopped 3/13/19 2111)   piperacillin-tazobactam (ZOSYN) 3.375 g vial to attach to  mL bag (0 g Intravenous Stopped 3/13/19 2253)       Drips infusing?:  No    For the majority of the shift this patient was Green.   Interventions performed were n/a.    Severe Sepsis OR Septic Shock Diagnosis Present:   Yes    Per the ED Provider, Time Zero for severe sepsis or septic shock is:  ?    3 Hour Severe Sepsis Bundle Completion:  1. Initial Lactic Acid Result:   Recent Labs   Lab Test 03/13/19  2020   LACT 2.2*     2. Blood Cultures before Antibiotics: Yes  3. Broad Spectrum Antibiotics Administered:     Anti-infectives (From now, onward)    None        4. 2000 ml of IV fluids have been given so far      6 Hour Severe Sepsis Bundle Completion:    1. Repeat Lactic Acid Level: Not drawn  2. Patient currently on Vasopressors =  No    To be done/followed up on inpatient unit:  none    ED NURSE PHONE NUMBER: [phone]

## 2019-03-14 NOTE — PROGRESS NOTES
RECEIVING UNIT ED HANDOFF REVIEW    ED Nurse Handoff Report was reviewed by: AMAYA SANDOVAL on March 14, 2019 at 1:13 AM

## 2019-03-14 NOTE — PHARMACY-ADMISSION MEDICATION HISTORY
Admission medication history interview status for the 3/13/2019  admission is complete. See EPIC admission navigator for prior to admission medications     Medication history source reliability:Good    Actions taken by pharmacist (provider contacted, etc): interview with patient.     Additional medication history information not noted on PTA med list :None    Medication reconciliation/reorder completed by provider prior to medication history? No    Time spent in this activity: 15 min    Prior to Admission medications    Medication Sig Last Dose Taking? Auth Provider   amiodarone (PACERONE/CODARONE) 200 MG tablet Take 1 tablet (200 mg) by mouth every morning And 2 tablets (400 mg) every evening for 2 weeks then 1 tablet every morning  Patient taking differently: Take 200 mg by mouth every morning  3/13/2019 at am Yes Satnam Jarvis MD   apixaban ANTICOAGULANT (ELIQUIS) 5 MG tablet Take 1 tablet (5 mg) by mouth 2 times daily 3/13/2019 at pm Yes Speedy Olivas MD   Cholecalciferol (VITAMIN D3 PO) Take 5,000 Units by mouth daily 3/13/2019 at am Yes Unknown, Entered By History   cyanocolbalamin (VITAMIN  B-12) 1000 MCG tablet Take 5,000 mcg by mouth daily. 3/13/2019 at am Yes Andrew Elizalde MD   ferrous fumarate 65 mg, [place]. FE,-Vitamin C 125 mg (VITRON-C)  MG TABS tablet 1 tab twice a day for anemia 3/13/2019 at am Yes Andrew Elizalde MD   fexofenadine (ALLEGRA) 180 MG tablet Take 1 tablet (180 mg) by mouth daily 3/13/2019 at am Yes Andrew Elizalde MD   fluticasone (FLONASE) 50 MCG/ACT spray USE ONE TO TWO SPRAYS IN EACH NOSTRIL EVERY DAY 3/13/2019 at am Yes Andrew Elizalde MD   gemfibrozil (LOPID) 600 MG tablet Take 1 tablet (600 mg) by mouth 2 times daily 3/13/2019 at pm Yes Speedy Olivas MD   insulin aspart (NOVOLOG FLEXPEN) 100 UNIT/ML injection Inject 3 times daily (before meals). 2 units per carb choice (approx 5-10 units/meal)  Patient taking differently: Inject 3 times daily (before meals). 2 units per carb  "choice (15gm). Approx 5-10 units/meal. 3/13/2019 at Unknown time Yes Andrew Elizalde MD   insulin detemir (LEVEMIR FLEXPEN/FLEXTOUCH) 100 UNIT/ML pen Inject 35-40 Units Subcutaneous At Bedtime  Patient taking differently: Inject 35 Units Subcutaneous At Bedtime  3/13/2019 at hs Yes Andrew Elizalde MD   levothyroxine (SYNTHROID/LEVOTHROID) 125 MCG tablet TAKE ONE TABLET BY MOUTH EVERY DAY 3/13/2019 at am Yes Andrew Elizalde MD   lisinopril (PRINIVIL,ZESTRIL) 30 MG tablet Take 1 tablet (30 mg) by mouth daily 3/13/2019 at am Yes Speedy Olivas MD   metFORMIN (GLUCOPHAGE) 1000 MG tablet TAKE ONE TABLET BY MOUTH TWO TIMES A DAY WITH MEALS 3/13/2019 at am Yes Andrew Elizalde MD   nitroglycerin (NITROSTAT) 0.4 MG SL tablet Place 1 tablet (0.4 mg) under the tongue every 5 minutes as needed for chest pain prn Yes Speedy Olivas MD   pravastatin (PRAVACHOL) 80 MG tablet TAKE ONE TABLET BY MOUTH EVERY NIGHT AT BEDTIME 3/13/2019 at hs Yes Speedy Olivas MD   ranitidine (ZANTAC) 150 MG tablet TAKE ONE TABLET BY MOUTH TWICE A DAY 3/13/2019 at Unknown time Yes Andrew Elizalde MD   testosterone cypionate (DEPOTESTOSTERONE) 200 MG/ML injection Inject 1.5 mLs (300 mg) into the muscle every 14 days 3/11/2019 Yes Andrew Elizalde MD   B-D LUER-FELICITAS SYRINGE 22G X 1-1/2\" 3 ML MISC USE WITH TESTOSTERONE EVERY 2 WEEKS AS DIRECTED   Andrew Elizalde MD   blood glucose monitoring (ONE TOUCH ULTRA) test strip Test twice daily with One Touch Ultra Blue   Andrew Elizalde MD   GLUCOSE 4 GM OR CHEW as directed   Andrew Elizalde MD   INSUPEN ULTRAFIN 30G X 8 MM USE FOUR TIMES A DAY   Andrew Elizalde MD   ONETOUCH DELICA LANCETS 33G MISC 1 Device 3 times daily . Use to test blood sugars 3 times daily or as directed.   Andrew Elizalde MD   order for DME Equipment being ordered: 3ml syringes and 22G 1 1/2\" needles to use with testosterone inj every 2 weeks   Veum, Adnrew STEPHEN MD         "

## 2019-03-14 NOTE — ANESTHESIA PREPROCEDURE EVALUATION
Anesthesia Pre-Procedure Evaluation    Patient: Pascual Perea   MRN: [id number] : 1940          Preoperative Diagnosis: unknown    Procedure(s):  ENDOSCOPIC RETROGRADE CHOLANGIOPANCREATOGRAM (EUS,ERCP)    Past Medical History:   Diagnosis Date     Adhesive capsulitis of shoulder      Anemia 3/10/2014     Anemia, unspecified      Antiplatelet or antithrombotic long-term use      Arrhythmia     Atrial fib/flutter     CAD (coronary artery disease)      History of cardioversion 2018    a single synchronized shock of 120 joules restored normal sinus rhythm     History of cardioversion 2019    single shock , 200 joules successul in restoring NSR     Hyperlipidemia LDL goal <70 2011     Hypertension      Hypothyroidism      Impotence of organic origin      Laceration of finger  2010     left thumb s/p sutures     Numbness and tingling     diabetic neuropathy bilateral feet     BRANDON (obstructive sleep apnea)     intolerant of CPAP, uses it occasionally.  Using mandibular device occasionally     Osteopenia      Pulmonary hypertension (H) 2012     Sensorineural hearing loss, unspecified      Stented coronary artery     CABG      Testosterone deficiency      Type 2 diabetes mellitus without complication  (goal A1C<8) 10/24/2015     Typical atrial flutter (H) 16     Unspecified hypothyroidism      Vitamin D deficiency 9/3/2011     Past Surgical History:   Procedure Laterality Date     ANESTHESIA CARDIOVERSION N/A 2019    Procedure: ANESTHESIA CARDIOVERSION;  Surgeon: GENERIC ANESTHESIA PROVIDER;  Location: [facility] OR     C NONSPECIFIC PROCEDURE      CABG ([place])     C NONSPECIFIC PROCEDURE      stress echo     CARDIAC SURGERY      bypass in      CARDIOVERSION  2018     COLONOSCOPY       CORONARY ANGIOGRAPHY ADULT ORDER      2012     CORONARY ARTERY BYPASS      [facility] to LAD     EXCISE MASS HEAD Left 2016    Procedure: EXCISE MASS  HEAD;  Surgeon: Ivan Hernandez MD;  Location: [facility]     HEART CATH, ANGIOPLASTY  4/2012     PHACOEMULSIFICATION CLEAR CORNEA WITH STANDARD INTRAOCULAR LENS IMPLANT Left 6/8/2015    Procedure: PHACOEMULSIFICATION CLEAR CORNEA WITH STANDARD INTRAOCULAR LENS IMPLANT;  Surgeon: Nixon Farnsworth MD;  Location: [facility]     PHACOEMULSIFICATION CLEAR CORNEA WITH STANDARD INTRAOCULAR LENS IMPLANT Right 6/22/2015    Procedure: PHACOEMULSIFICATION CLEAR CORNEA WITH STANDARD INTRAOCULAR LENS IMPLANT;  Surgeon: Nixon Farnsworth MD;  Location:  [facility]     SEPTOPLASTY, TURBINOPLASTY, COMBINED Bilateral 8/18/2016    Procedure: COMBINED SEPTOPLASTY, TURBINOPLASTY;  Surgeon: Ivan Hernandez MD;  Location: [facility]       Anesthesia Evaluation     . Pt has had prior anesthetic. Type: General    No history of anesthetic complications          ROS/MED HX    ENT/Pulmonary:     (+)sleep apnea, doesn't use CPAP , . .    Neurologic:     (+)neuropathy     Cardiovascular:     (+) Dyslipidemia, hypertension--CAD, -CABG-. Taking blood thinners : . . . :. dysrhythmias a-flutter and a-fib, . pulmonary hypertension, Previous cardiac testing Echodate:results:4/18/2018 11:13 AM JESÚS VASQUEZ 77 years Male 1940.     Indication/Clinical History: Increased troponins     Impression  1. Myocardial perfusion imaging using single isotope technique  demonstrated a small basal inferior infarct with small area of mild  ischemia in the inferior mid ventricle  There is a very small possible apical nontransmural infarct with  minimal mikey-infarct ischemia (previously described).   2. Gated images demonstrated basal inferior hypokinesis. The left  ventricular systolic function is 72% at rest and 50% post stress.  3. Compared to the prior study from 4/15/2016, prior study described a  very small fixed apical defect possibly representing nontransmural  infarct with an ejection fraction of 61%.date: results: date: results: date: results:         "  METS/Exercise Tolerance:     Hematologic:     (+) Anemia, -      Musculoskeletal:         GI/Hepatic:     (+) GERD       Renal/Genitourinary:         Endo:     (+) type II DM thyroid problem hypothyroidism, .      Psychiatric:         Infectious Disease:         Malignancy:         Other:                          Physical Exam  Normal systems: cardiovascular, pulmonary and dental    Airway   Mallampati: II  TM distance: >3 FB  Neck ROM: full    Dental     Cardiovascular   Rhythm and rate: irregular and normal      Pulmonary    breath sounds clear to auscultation    Other findings: Bedside EKG shows afib        Lab Results   Component Value Date    WBC 10.2 03/14/2019    HGB 13.4 03/14/2019    HCT 40.0 03/14/2019     (L) 03/14/2019    SED 4 02/24/2017     03/14/2019    POTASSIUM 4.6 03/14/2019    CHLORIDE 107 03/14/2019    CO2 23 03/14/2019    BUN 33 (H) 03/14/2019    CR 1.93 (H) 03/14/2019     (H) 03/14/2019    STARR 8.5 03/14/2019    MAG 1.6 02/13/2019    ALBUMIN 2.9 (L) 03/14/2019    PROTTOTAL 5.5 (L) 03/14/2019     (H) 03/14/2019     (H) 03/14/2019    ALKPHOS 104 03/14/2019    BILITOTAL 7.3 (H) 03/14/2019    LIPASE 121 03/13/2019    PTT 52 (H) 04/18/2018    INR 1.37 (H) 03/14/2019    TSH 0.99 08/13/2018    T4 0.82 04/15/2016       Preop Vitals  BP Readings from Last 3 Encounters:   03/14/19 99/52   03/13/19 94/56   02/13/19 152/90    Pulse Readings from Last 3 Encounters:   03/14/19 77   03/13/19 63   02/13/19 53      Resp Readings from Last 3 Encounters:   03/14/19 20   02/13/19 18   12/04/18 16    SpO2 Readings from Last 3 Encounters:   03/14/19 95%   03/13/19 94%   02/13/19 98%      Temp Readings from Last 1 Encounters:   03/14/19 37.4  C (99.4  F)    Ht Readings from Last 1 Encounters:   03/13/19 1.803 m (5' 11\")      Wt Readings from Last 1 Encounters:   03/13/19 88.9 kg (196 lb)    Estimated body mass index is 27.34 kg/m  as calculated from the following:    Height as of " "this encounter: 1.803 m (5' 11\").    Weight as of this encounter: 88.9 kg (196 lb).       Anesthesia Plan      History & Physical Review  History and physical reviewed and following examination; no interval change.    ASA Status:  3 .        Plan for General and ETT with Intravenous induction. Maintenance will be Balanced.    PONV prophylaxis:  Ondansetron (or other 5HT-3)       Postoperative Care      Consents  Anesthetic plan, risks, benefits and alternatives discussed with:  Patient..                 Gabriel Winslow MD  "

## 2019-03-14 NOTE — ED PROVIDER NOTES
History     Chief Complaint:  Abdominal Pain    HPI   Pascual Perea is a 78 year old male status post CABG, on Eliquis for Afib, and with a history of CAD, hypertension, hyperlipidemia, diabetes, and hypothyroidism who presents with abdominal pain. The patient reports that by 0200 hours today he developed lower left quadrant abdominal pain with hard distended abdomen. He reports this pain resolved this morning yet he had no appetite for breakfast and he vomited subsequently after his lunch with recurring abdominal pain, prompting his presentation by spouse. He states that he has been passing normal bowel movements and passing gas. He denies diarrhea or bloody or black stools, though he notes he does have dark stool as he takes iron.  No fever. He has not noted yellowing of the eyes or skin.  Has not been taking excessive tylenol.  He has been feeling weak, but has not had focal weakness.  No chest pain or shortness of breath.      Allergies:  Omeprazole    Medications:    amiodarone (PACERONE/CODARONE) 200 MG tablet  apixaban ANTICOAGULANT (ELIQUIS) 5 MG tablet  Cholecalciferol (VITAMIN D3 PO)  cyanocolbalamin (VITAMIN  B-12) 1000 MCG tablet  ferrous fumarate 65 mg, [place]. FE,-Vitamin C 125 mg (VITRON-C)  MG TABS tablet  fexofenadine (ALLEGRA) 180 MG tablet  fluticasone (FLONASE) 50 MCG/ACT spray  gemfibrozil (LOPID) 600 MG tablet  GLUCOSE 4 GM OR CHEW  insulin aspart (NOVOLOG FLEXPEN) 100 UNIT/ML injection  insulin detemir (LEVEMIR FLEXPEN/FLEXTOUCH) 100 UNIT/ML pen  levothyroxine (SYNTHROID/LEVOTHROID) 125 MCG tablet  lisinopril (PRINIVIL,ZESTRIL) 30 MG tablet  metFORMIN (GLUCOPHAGE) 1000 MG tablet  nitroglycerin (NITROSTAT) 0.4 MG SL tablet  pravastatin (PRAVACHOL) 80 MG tablet  ranitidine (ZANTAC) 150 MG tablet  testosterone cypionate (DEPOTESTOSTERONE) 200 MG/ML injection    Past Medical History:    Adhesive capsulitis of shoulder   Anemia   Anemia, unspecified   Antiplatelet or antithrombotic  long-term use   Arrhythmia   CAD (coronary artery disease)    cardioversion   Hyperlipidemia LDL goal <70   Hypothyroidism   Impotence of organic origin   Laceration of finger   Numbness and tingling   BRANDON (obstructive sleep apnea)   Osteopenia   Pulmonary hypertension (H)   Sensorineural hearing loss, unspecified   Stented coronary artery   Testosterone deficiency   Type 2 diabetes mellitus without complication  (goal A1C<8)   Typical atrial flutter (H)   Unspecified hypothyroidism   Vitamin D deficiency   Atrial fibrillation, unspecified type (H)   Type 2 diabetes mellitus without complication, with long-term current use of insulin (H)    Gastroesophageal reflux disease without esophagitis   Atrial flutter (H)    Pulmonary hypertension (H)   Health Care Home   Advanced directives, counseling/discussion    Allergic rhinitis   Coronary atherosclerosis   Impotence of organic origin   Essential hypertension, benign     Past Surgical History:    ANESTHESIA CARDIOVERSION   CABG ([employer])   stress echo  CARDIAC SURGERY: bypass in 1997  CARDIOVERSION   COLONOSCOPY   CORONARY ANGIOGRAPHY ADULT ORDER   CORONARY ARTERY BYPASS   EXCISE MASS HEAD   HEART CATH, ANGIOPLASTY   PHACOEMULSIFICATION CLEAR CORNEA WITH STANDARD INTRAOCULAR LENS IMPLANT   PHACOEMULSIFICATION CLEAR CORNEA WITH STANDARD INTRAOCULAR LENS IMPLANT   SEPTOPLASTY, TURBINOPLASTY, COMBINED     Family History:    Mother: CAD, MI  Brother: CAD, Heart Disease, MI  Genitourinary Problems  Hypertension  Diabetes    Social History:  Marital Status:     Tobacco Status: Former 1 PPD Smoker, Quit in 1964; Never Used Smokeless   Alcohol Use: No  Drug Use: No  Presents: By Spouse         Review of Systems   Constitutional: Positive for appetite change. Negative for fever.   Gastrointestinal: Positive for abdominal distention, abdominal pain and vomiting. Negative for blood in stool, constipation and diarrhea.     10 point review of systems performed and is  "negative except as above and in HPI.      Physical Exam     Patient Vitals for the past 24 hrs:   BP Temp Temp src Pulse Heart Rate SpO2 Height Weight   03/13/19 2350 108/50 -- -- -- -- 94 % -- --   03/13/19 2213 -- 98  F (36.7  C) Oral -- -- -- -- --   03/13/19 2150 (!) 89/57 -- -- 73 -- -- -- --   03/13/19 2100 94/43 -- -- 77 -- 96 % -- --   03/13/19 1938 91/46 97.6  F (36.4  C) Oral -- 70 98 % 1.803 m (5' 11\") 88.9 kg (196 lb)       Physical Exam  General: Resting on the gurney, appears uncomfortable  Head:  The scalp, face, and head appear normal  Mouth/Throat: Mucus membranes are moist  Eyes:   Scleral icterus present. No edema.   CV:  Regular rate    Normal S1 and S2  No pathological murmur   Resp:  Breath sounds clear and equal bilaterally    Non-labored, no retractions or accessory muscle use    No coarseness    No wheezing   GI:  Abdomen is soft, no rigidity    Abdomen distended and diffusely tender, worst in the RUQ. No guarding, no rebound.   MS:  Normal motor assessment of all extremities.    Good capillary refill noted.  Skin:  Slight jaundice. No rash or lesions noted.  Neuro:  Speech is normal and fluent. No apparent deficit.  Psych:  Awake. Alert.  Normal affect.      Appropriate interactions.    Emergency Department Course     Imaging:  Radiographic findings were communicated with the patient and Admitting MD who voiced understanding of the findings.  CT-scan Abdomen/Pelvis w/o contrast:  IMPRESSION: No acute process demonstrated in the abdomen and pelvis.  Final result per radiology.   Abdomen US, Limited (RUQ Only):   IMPRESSION:    1. Contracted gallbladder.  2. Small mobile nonshadowing echogenic focus within the gallbladder.  This is most compatible with a sludge ball.  Final result per radiology.     Laboratory:  Urine Culture: Pending  Blood Culture: Pending x2  Acetaminophen Level: <2 (N)  Lipase: 121 (N)  CMP: Potassium 5.7 (H), Glucose 230 (H), Urea Nitrogen 31 (H), Creatinine 2.36 (H), " GFR 25 (L), Bilirubin 8.5 (H), Protein Total 6.3 (L),  (H),  (H) o/w WNL  Lactic Acid Whole Blood: 2.2 (H)  UA with Microscopic: Glucose 30 (A), Bilirubin Moderate (A), Blood Trace (A), Protein Albumin 30 (A), Urobilinogen >12.o (H), Leukocyte Esterase Moderate (A), WBC/HPF 30 (H), RBC/HPF 4 (H), WBC Clumps Present (A), Bacteria Few (A), Transitional Epi/HPF 3 (H), Hyaline Casts/HPF 68 (H), Granular Casts/HPF 8 (A), Cellular Cast/HPF 30 (A) o/w Unremarkable  CBC: WBC 20.7 (H), Absolute Neutrophil 19.5 (H), Absolute Lymphocyte 0.4 (L) o/w WNL (HGB 15.2, )    Interventions:  2038: 0.9% Sodium Chloride Bolus 1000 ml IV  2152: 0.9% Sodium Chloride Bolus 1000 ml IV  2253: Zosyn Infusion 3.375 g Vial to Attach to  ml IV    Emergency Department Course:  Past medical records, nursing notes, and vitals reviewed.  2021: I performed an exam of the patient and obtained history, as documented above.   IV inserted, urine collected, and blood drawn. The patient was placed on continuous cardiac monitoring and pulse oximetry.  The patient was sent for Abdomen/Pelvis CT-Scan and Limited Abdomen Ultrasound while in the emergency department, findings above.  2253: IV Zosyn administered.   2300: I discussed the case with Dr. Mejias of Gen Surgery regarding the patient.  2310: I discussed the case with Dr. Spence of Hospitalist Service  regarding the patient.  Findings and plan explained to the Patient who consents to admission.   2310: Discussed the patient with Dr. Spence of Hospitalist Service, who will admit the patient to a [facility] bed for further monitoring, evaluation, and treatment.     Impression & Plan      Medical Decision Making:  Pascual Perea is a 78 year old male who presents with abdominal pain, vomiting, and feeling generally unwell.  I did note slight scleral icterus on initial exam as well as right upper quadrant tenderness therefore ultrasound was obtained.  Laboratory evaluation was  undertaken as well the patient was found to have marketed leukocytosis, lactic acidosis, and considerably elevated bilirubin.  Acetaminophen level checked and undetectable.  The patient's ultrasound surprisingly nondiagnostic and therefore a CT was obtained which also did not show acute pathology.  Given the patient's laboratory derangements and clinical picture I did start Zosyn and aggressively start IV fluids.  Patient was discussed with Dr. vanegas who requested that the patient be admitted by general medicine and recommended GI consult.  The general surgery team will also see the patient on an inpatient basis.  Patient is admitted to the hospitalist service and critical but stabilized condition.    Critical care time 40 minutes exclusive of procedures.    Diagnosis:    ICD-10-CM    1. Abdominal pain R10.9 Blood culture     Blood culture     Urine Culture       Disposition:  Admitted to [facility]      Onel Carrillo  3/13/2019    EMERGENCY DEPARTMENT  IOnel, am serving as a scribe at 8:21 PM on 3/13/2019 to document services personally performed by Melia Chairez MD based on my observations and the provider's statements to me.         Melia Chairez MD  03/14/19 0055

## 2019-03-14 NOTE — PLAN OF CARE
A&OX4. [place] with bilateral hearing aids. VSS on RA. Tele- Afib with CVR. Numbness present, baseline per pt. BM X1, loose, dark color d/t taking iron hence unable to tell if blood was in stool. Adequate output. BG monitoring. Denies nausea. C/O some RLQ tenderness. Declined any pain med. Continue to monitor.

## 2019-03-14 NOTE — H&P
Admitted:     03/13/2019      PRIMARY CARE PHYSICIAN:  Andrew Elizalde MD      CODE STATUS:  Full code.      CHIEF COMPLAINT:  Abdominal pain.      HISTORY OF PRESENT ILLNESS:  Mr. Perea is a 78-year-old male with a past medical history significant for type 2 diabetes, coronary artery disease, atrial flutter, hypothyroidism who presents to the Emergency Department with the above concern.  History is obtained through discussion with ED physician, the patient and his wife at bedside.  The patient notes that he was feeling well yesterday but woke up at approximately 2 this morning with some lower abdominal discomfort which he described as gas pains.  He noted that the pain really did not improve much throughout the day.  He was not hungry and actually had some nausea but did not throw up until he tried to eat about half a cup of cereal around lunchtime.  Shortly after eating he had emesis and has felt unwell for the remainder of the day.  He has had a couple loose bowel movements today which were nonbloody.  There was no blood in his emesis.  No fevers, but he has felt chilled.  He also describes having a headache.  No chest discomfort or shortness of breath.  No recent travel or sick contacts.  He does not have any history of previous liver issues, gallbladder issues or pancreas issues.  The patient does note that sometime in February he was started on amiodarone for atrial fibrillation.  The remainder of his medications have been more long-term.  He is on gemfibrozil which has been dated back many years.  Blood sugars have been fairly well controlled.  He notes he typically takes 35 units of Levemir at bedtime but has not yet taken this tonight.      In the Emergency Department, the patient had multiple lab abnormalities, including a creatinine of 2.36, potassium of 5.7 and elevated bilirubin and transaminases.  A UA also showed dark brown urine with greater than 12 urobilinogen and multiple other abnormalities  including hyalin casts, granular casts cellular casts.  White blood cell count was also noted to be 20.  Interestingly, he had imaging including abdominal ultrasound and abdominal CT, both showing no acute abnormalities.  The abdominal ultrasound did show some possible sludge in the gallbladder.  He was given a dose of Zosyn for possible infectious etiology.      When I saw the patient in the ED, he notes that he was not substantially changed in terms of his symptoms and continued to have lower abdominal cramping discomfort.      PAST MEDICAL HISTORY:   1.  Type 2 diabetes with neuropathy.   2.  Coronary artery disease, status post CABG.   3.  Atrial flutter.   4.  Pulmonary hypertension.   5.  Dyslipidemia.   6.  Hypothyroidism.   7.  BRANDON.   8.  Erectile dysfunction.   9.  Vitamin D deficiency.   10.  Hypertension.   11.  Allergic rhinitis.   12.  GERD.      MEDICATIONS:    Medications Prior to Admission   Medication Sig Dispense Refill Last Dose     amiodarone (PACERONE/CODARONE) 200 MG tablet Take 1 tablet (200 mg) by mouth every morning And 2 tablets (400 mg) every evening for 2 weeks then 1 tablet every morning (Patient taking differently: Take 200 mg by mouth every morning ) 90 tablet 3 3/13/2019 at am     apixaban ANTICOAGULANT (ELIQUIS) 5 MG tablet Take 1 tablet (5 mg) by mouth 2 times daily 60 tablet 11 3/13/2019 at pm     Cholecalciferol (VITAMIN D3 PO) Take 5,000 Units by mouth daily   3/13/2019 at am     cyanocolbalamin (VITAMIN  B-12) 1000 MCG tablet Take 5,000 mcg by mouth daily.   3/13/2019 at am     ferrous fumarate 65 mg, [place]. FE,-Vitamin C 125 mg (VITRON-C)  MG TABS tablet 1 tab twice a day for anemia 60 tablet 11 3/13/2019 at am     fexofenadine (ALLEGRA) 180 MG tablet Take 1 tablet (180 mg) by mouth daily 90 tablet 3 3/13/2019 at am     fluticasone (FLONASE) 50 MCG/ACT spray USE ONE TO TWO SPRAYS IN EACH NOSTRIL EVERY DAY 48 g 3 3/13/2019 at am     gemfibrozil (LOPID) 600 MG tablet Take 1  "tablet (600 mg) by mouth 2 times daily 180 tablet 3 3/13/2019 at pm     insulin aspart (NOVOLOG FLEXPEN) 100 UNIT/ML injection Inject 3 times daily (before meals). 2 units per carb choice (approx 5-10 units/meal) (Patient taking differently: Inject 3 times daily (before meals). 2 units per carb choice (15gm). Approx 5-10 units/meal.) 30 mL 0 3/13/2019 at Unknown time     insulin detemir (LEVEMIR FLEXPEN/FLEXTOUCH) 100 UNIT/ML pen Inject 35-40 Units Subcutaneous At Bedtime (Patient taking differently: Inject 35 Units Subcutaneous At Bedtime ) 45 mL 3 3/13/2019 at hs     levothyroxine (SYNTHROID/LEVOTHROID) 125 MCG tablet TAKE ONE TABLET BY MOUTH EVERY DAY 90 tablet 3 3/13/2019 at am     lisinopril (PRINIVIL,ZESTRIL) 30 MG tablet Take 1 tablet (30 mg) by mouth daily 90 tablet 3 3/13/2019 at am     metFORMIN (GLUCOPHAGE) 1000 MG tablet TAKE ONE TABLET BY MOUTH TWO TIMES A DAY WITH MEALS 180 tablet 3 3/13/2019 at am     nitroglycerin (NITROSTAT) 0.4 MG SL tablet Place 1 tablet (0.4 mg) under the tongue every 5 minutes as needed for chest pain 25 tablet 1 prn     pravastatin (PRAVACHOL) 80 MG tablet TAKE ONE TABLET BY MOUTH EVERY NIGHT AT BEDTIME 90 tablet 3 3/13/2019 at hs     ranitidine (ZANTAC) 150 MG tablet TAKE ONE TABLET BY MOUTH TWICE A  tablet 2 3/13/2019 at Unknown time     testosterone cypionate (DEPOTESTOSTERONE) 200 MG/ML injection Inject 1.5 mLs (300 mg) into the muscle every 14 days 10 mL 1 3/11/2019     B-D LUER-FELICITAS SYRINGE 22G X 1-1/2\" 3 ML MISC USE WITH TESTOSTERONE EVERY 2 WEEKS AS DIRECTED 30 each 1 Taking     blood glucose monitoring (ONE TOUCH ULTRA) test strip Test twice daily with One Touch Ultra Blue 200 each 3 Taking     GLUCOSE 4 GM OR CHEW as directed 30 11 Taking     INSUPEN ULTRAFIN 30G X 8 MM USE FOUR TIMES A  each 0 Taking     ONETOUCH DELICA LANCETS 33G MISC 1 Device 3 times daily . Use to test blood sugars 3 times daily or as directed. 300 each prn Taking     order for DME " "Equipment being ordered: 3ml syringes and 22G 1 1/2\" needles to use with testosterone inj every 2 weeks 30 Device 1 Taking           SOCIAL HISTORY:  The patient quit smoking in 1964 and drinks alcohol just a couple of times per year.      FAMILY HISTORY:  Mother had heart disease and father had kidney failure.      ALLERGIES:  OMEPRAZOLE CAUSED DIARRHEA.      REVIEW OF SYSTEMS:  The complete review of systems reviewed and negative, save for the pertinent positives, which are recorded in the HPI.      PHYSICAL EXAMINATION:   VITAL SIGNS:  Show blood pressure of 108/50, heart rate 70, respirations 18, saturating 94% on room air with temperature of 98.0 degrees Fahrenheit.   GENERAL:  The patient is sitting in bed and appears relatively comfortable.   HEENT:  Pupils equal and reactive to light, extraocular muscle function intact.  No scleral icterus.  Oropharynx is clear.   NECK:  No lymphadenopathy or thyromegaly.   CARDIOVASCULAR:  Heart is regular rate and rhythm with a 2/6 systolic murmur heard best at the left sternal border.   PULMONARY:  Lungs are clear to auscultation bilaterally without wheeze or rhonchi.   GASTROINTESTINAL:  Positive bowel sounds, soft with tenderness mostly in the left lower quadrants, but seems mildly uncomfortable with palpation in the epigastric and right upper quadrant region as well.  Negative Brumfield's sign.   SKIN:  No rashes or lesions.   LYMPHATICS:  No peripheral edema.   PSYCHIATRIC:  Alert and oriented x3, normal affect.   NEUROLOGIC:  Cranial nerves II-XII are grossly intact.  No focal deficits.      LABORATORIES:  WBC count 20.7 with left shift, hemoglobin 15.2 and platelets of 164.  Sodium 135, potassium 5.7, chloride 103, CO2 of 25, BUN 31, creatinine 2.36.  Total bilirubin of 8.5, , .  Lactic acid of 2.2.  Lipase of 121.  Alkaline phosphatase is normal at 127.  Glucose is 230.  UA as above, has a significant urobilinogen, few WBCs at 30, a few bacteria and WBC " clumps as well as multiple casts.   Abdominal ultrasound shows sludge, but no acute abnormalities.  An abdominal CT shows nothing acute.      ASSESSMENT AND PLAN:  Mr. Perea is a 78-year-old male with a past medical history significant for diabetes, coronary artery disease, atrial flutter, hypothyroidism and hypertension who presents to the Emergency Department with nausea and vomiting.   1.  Acute liver injury:  Etiology is somewhat unclear at this point.  He may have a cholestatic picture and less likely obstructive given that his alkaline phosphatase and lipase are unremarkable.  Cannot rule out infectious etiology given the leukocytosis with left shift and his chills, but imaging is unremarkable.  I am most suspicious of a medication reaction given that he just started on amiodarone within the past month and hepatotoxicity is listed as a reaction.  Multiple other medications including gemfibrozil, metformin and Zantac all have hepatotoxicity as possibilities though these are more long-term medications for him.  I am going to hold all these meds for now, continue with Zosyn for possible infectious etiology and plan for GI consultation in the morning to guide further management and whether or not he may need something like an MRCP.  We will plan to repeat his LFTs in the morning to assess his trends.   2.  Acute kidney injury:  In the setting of acute liver injury and poor p.o. intake.  We will continue with IV fluid rehydration, holding lisinopril and plan to check a BMP again in the morning.   3.  Mild hyperkalemia in the setting of acute kidney injury.  Holding lisinopril as above and will plan to recheck a potassium on admission.  Again I suspect this has improved with IV fluids.  If it still remains elevated, we will consider shifting and giving some albuterol.  He will be getting insulin this evening for his diabetes.  We will monitor on telemetry.   4.  Type 2 diabetes:  He is having minimal p.o. intake  at this point, so will cut his Levemir down from 35-15 units and place on a sliding scale.   5.  Hypothyroidism:  Will resume levothyroxine once dose confirmed.   6.  Atrial flutter:  We will monitor on telemetry.  Holding amiodarone for now given the possible association with his acute liver injury.  Consider using metoprolol as needed.  He appears to be on apixaban, which can be continued once this is confirmed and pending any plan for procedures by GI.   7.  Dyslipidemia:  Hold his PTA regimen for now given the acute liver injury.   8.  Deep venous thrombosis prophylaxis:  He appears to be on Xarelto.   9.  Disposition:  Admit as an inpatient.  Expect at least 2 nights in the hospital for further workup of his liver disease.         KELI HENRIQUEZ DO             D: 2019   T: 2019   MT: BRIEN      Name:     JESÚS VASQUEZ   MRN:      [id number]        Account:      [id number]   :      1940        Admitted:     2019                   Document: [id number]       cc: Andrew Elizalde MD

## 2019-03-14 NOTE — PROGRESS NOTES
Another blood culture positive drawn from left arm at 21:50 yesterday, and the results are gram negative rods. Dr. Viji aguero.

## 2019-03-14 NOTE — ANESTHESIA CARE TRANSFER NOTE
Patient: Pascual Perea    Procedure(s):  ENDOSCOPIC ULTRASOUND OF UPPER GASTROINTESTINAL TRACT    Diagnosis: unknown  Diagnosis Additional Information: No value filed.    Anesthesia Type:   General, ETT     Note:  Airway :Face Mask and Oral Airway  Patient transferred to:PACU  Comments:  TOF 4/4, spontaneous respirations, adequate tidal volumes, followed commands to voice, oropharynx suctioned with soft flexible catheter, extubated atraumatically, extubated with suction, airway patent after extubation.  Oxygen via facemask at 6 liters per minute to PACU. Oxygen tubing connected to wall O2 in PACU, SpO2, NiBP, and EKG monitors and alarms on and functioning, Amanda Hugger warmer connected to patient gown, report on patient's clinical status given to PACU RN, RN questions answered. Handoff Report: Identifed the Patient, Identified the Reponsible Provider, Reviewed the pertinent medical history, Discussed the surgical course, Reviewed Intra-OP anesthesia mangement and issues during anesthesia, Set expectations for post-procedure period and Allowed opportunity for questions and acknowledgement of understanding      Vitals: (Last set prior to Anesthesia Care Transfer)    CRNA VITALS  3/14/2019 1412 - 3/14/2019 1450      3/14/2019             Resp Rate (set):  10                Electronically Signed By: JAKI Qureshi CRNA  March 14, 2019  2:50 PM

## 2019-03-15 LAB
ALBUMIN SERPL-MCNC: 2.7 G/DL (ref 3.4–5)
ALP SERPL-CCNC: 101 U/L (ref 40–150)
ALT SERPL W P-5'-P-CCNC: 150 U/L (ref 0–70)
ANION GAP SERPL CALCULATED.3IONS-SCNC: 8 MMOL/L (ref 3–14)
AST SERPL W P-5'-P-CCNC: 71 U/L (ref 0–45)
BACTERIA SPEC CULT: ABNORMAL
BASOPHILS # BLD AUTO: 0 10E9/L (ref 0–0.2)
BASOPHILS NFR BLD AUTO: 0.2 %
BILIRUB DIRECT SERPL-MCNC: 4.3 MG/DL (ref 0–0.2)
BILIRUB SERPL-MCNC: 5 MG/DL (ref 0.2–1.3)
BUN SERPL-MCNC: 30 MG/DL (ref 7–30)
CALCIUM SERPL-MCNC: 8.2 MG/DL (ref 8.5–10.1)
CHLORIDE SERPL-SCNC: 106 MMOL/L (ref 94–109)
CO2 SERPL-SCNC: 21 MMOL/L (ref 20–32)
CREAT SERPL-MCNC: 1.56 MG/DL (ref 0.66–1.25)
DIFFERENTIAL METHOD BLD: ABNORMAL
EOSINOPHIL # BLD AUTO: 0 10E9/L (ref 0–0.7)
EOSINOPHIL NFR BLD AUTO: 0.3 %
ERYTHROCYTE [DISTWIDTH] IN BLOOD BY AUTOMATED COUNT: 15.5 % (ref 10–15)
GFR SERPL CREATININE-BSD FRML MDRD: 42 ML/MIN/{1.73_M2}
GLUCOSE BLDC GLUCOMTR-MCNC: 138 MG/DL (ref 70–99)
GLUCOSE BLDC GLUCOMTR-MCNC: 148 MG/DL (ref 70–99)
GLUCOSE BLDC GLUCOMTR-MCNC: 177 MG/DL (ref 70–99)
GLUCOSE BLDC GLUCOMTR-MCNC: 209 MG/DL (ref 70–99)
GLUCOSE BLDC GLUCOMTR-MCNC: 236 MG/DL (ref 70–99)
GLUCOSE SERPL-MCNC: 182 MG/DL (ref 70–99)
HCT VFR BLD AUTO: 38.8 % (ref 40–53)
HGB BLD-MCNC: 13.1 G/DL (ref 13.3–17.7)
IMM GRANULOCYTES # BLD: 0 10E9/L (ref 0–0.4)
IMM GRANULOCYTES NFR BLD: 0.2 %
LYMPHOCYTES # BLD AUTO: 0.4 10E9/L (ref 0.8–5.3)
LYMPHOCYTES NFR BLD AUTO: 6 %
Lab: ABNORMAL
MCH RBC QN AUTO: 28.5 PG (ref 26.5–33)
MCHC RBC AUTO-ENTMCNC: 33.8 G/DL (ref 31.5–36.5)
MCV RBC AUTO: 85 FL (ref 78–100)
MONOCYTES # BLD AUTO: 0.2 10E9/L (ref 0–1.3)
MONOCYTES NFR BLD AUTO: 3.4 %
NEUTROPHILS # BLD AUTO: 5.2 10E9/L (ref 1.6–8.3)
NEUTROPHILS NFR BLD AUTO: 89.9 %
NRBC # BLD AUTO: 0 10*3/UL
NRBC BLD AUTO-RTO: 0 /100
PHOSPHATE SERPL-MCNC: 2.4 MG/DL (ref 2.5–4.5)
PLATELET # BLD AUTO: 101 10E9/L (ref 150–450)
POTASSIUM SERPL-SCNC: 4.3 MMOL/L (ref 3.4–5.3)
PROT SERPL-MCNC: 5.7 G/DL (ref 6.8–8.8)
RBC # BLD AUTO: 4.59 10E12/L (ref 4.4–5.9)
SODIUM SERPL-SCNC: 135 MMOL/L (ref 133–144)
SPECIMEN SOURCE: ABNORMAL
WBC # BLD AUTO: 5.8 10E9/L (ref 4–11)

## 2019-03-15 PROCEDURE — 84450 TRANSFERASE (AST) (SGOT): CPT | Performed by: INTERNAL MEDICINE

## 2019-03-15 PROCEDURE — 80069 RENAL FUNCTION PANEL: CPT | Performed by: INTERNAL MEDICINE

## 2019-03-15 PROCEDURE — 00000146 ZZHCL STATISTIC GLUCOSE BY METER IP

## 2019-03-15 PROCEDURE — 99231 SBSQ HOSP IP/OBS SF/LOW 25: CPT | Performed by: SURGERY

## 2019-03-15 PROCEDURE — 82247 BILIRUBIN TOTAL: CPT | Performed by: INTERNAL MEDICINE

## 2019-03-15 PROCEDURE — 36415 COLL VENOUS BLD VENIPUNCTURE: CPT | Performed by: INTERNAL MEDICINE

## 2019-03-15 PROCEDURE — 25000132 ZZH RX MED GY IP 250 OP 250 PS 637: Performed by: INTERNAL MEDICINE

## 2019-03-15 PROCEDURE — 25800030 ZZH RX IP 258 OP 636: Performed by: INTERNAL MEDICINE

## 2019-03-15 PROCEDURE — 84460 ALANINE AMINO (ALT) (SGPT): CPT | Performed by: INTERNAL MEDICINE

## 2019-03-15 PROCEDURE — 25000128 H RX IP 250 OP 636: Performed by: INTERNAL MEDICINE

## 2019-03-15 PROCEDURE — 85025 COMPLETE CBC W/AUTO DIFF WBC: CPT | Performed by: INTERNAL MEDICINE

## 2019-03-15 PROCEDURE — 25000132 ZZH RX MED GY IP 250 OP 250 PS 637: Performed by: HOSPITALIST

## 2019-03-15 PROCEDURE — 25000131 ZZH RX MED GY IP 250 OP 636 PS 637: Performed by: HOSPITALIST

## 2019-03-15 PROCEDURE — 84155 ASSAY OF PROTEIN SERUM: CPT | Performed by: INTERNAL MEDICINE

## 2019-03-15 PROCEDURE — 12000000 ZZH R&B MED SURG/OB

## 2019-03-15 PROCEDURE — 99232 SBSQ HOSP IP/OBS MODERATE 35: CPT | Performed by: HOSPITALIST

## 2019-03-15 PROCEDURE — 82248 BILIRUBIN DIRECT: CPT | Performed by: INTERNAL MEDICINE

## 2019-03-15 PROCEDURE — 84075 ASSAY ALKALINE PHOSPHATASE: CPT | Performed by: INTERNAL MEDICINE

## 2019-03-15 RX ORDER — FEXOFENADINE HCL 180 MG/1
180 TABLET ORAL DAILY
Status: DISCONTINUED | OUTPATIENT
Start: 2019-03-15 | End: 2019-03-18 | Stop reason: HOSPADM

## 2019-03-15 RX ORDER — PIPERACILLIN SODIUM, TAZOBACTAM SODIUM 3; .375 G/15ML; G/15ML
3.38 INJECTION, POWDER, LYOPHILIZED, FOR SOLUTION INTRAVENOUS EVERY 6 HOURS
Status: DISCONTINUED | OUTPATIENT
Start: 2019-03-15 | End: 2019-03-18

## 2019-03-15 RX ADMIN — FLUTICASONE PROPIONATE 1 SPRAY: 50 SPRAY, METERED NASAL at 08:10

## 2019-03-15 RX ADMIN — PIPERACILLIN SODIUM,TAZOBACTAM SODIUM 3.38 G: 3; .375 INJECTION, POWDER, FOR SOLUTION INTRAVENOUS at 17:17

## 2019-03-15 RX ADMIN — PIPERACILLIN SODIUM,TAZOBACTAM SODIUM 3.38 G: 3; .375 INJECTION, POWDER, FOR SOLUTION INTRAVENOUS at 23:04

## 2019-03-15 RX ADMIN — FEXOFENADINE HYDROCHLORIDE 180 MG: 180 TABLET, FILM COATED ORAL at 10:13

## 2019-03-15 RX ADMIN — INSULIN DETEMIR 25 UNITS: 100 INJECTION, SOLUTION SUBCUTANEOUS at 23:05

## 2019-03-15 RX ADMIN — SODIUM CHLORIDE: 9 INJECTION, SOLUTION INTRAVENOUS at 07:28

## 2019-03-15 RX ADMIN — LEVOTHYROXINE SODIUM 125 MCG: 100 TABLET ORAL at 08:10

## 2019-03-15 RX ADMIN — AMIODARONE HYDROCHLORIDE 200 MG: 200 TABLET ORAL at 08:11

## 2019-03-15 RX ADMIN — APIXABAN 5 MG: 5 TABLET, FILM COATED ORAL at 08:11

## 2019-03-15 RX ADMIN — PIPERACILLIN SODIUM,TAZOBACTAM SODIUM 3.38 G: 3; .375 INJECTION, POWDER, FOR SOLUTION INTRAVENOUS at 10:13

## 2019-03-15 RX ADMIN — SODIUM CHLORIDE: 9 INJECTION, SOLUTION INTRAVENOUS at 17:17

## 2019-03-15 RX ADMIN — RANITIDINE 150 MG: 150 TABLET ORAL at 08:11

## 2019-03-15 RX ADMIN — PIPERACILLIN AND TAZOBACTAM 2.25 G: 2; .25 INJECTION, POWDER, FOR SOLUTION INTRAVENOUS at 04:16

## 2019-03-15 ASSESSMENT — MIFFLIN-ST. JEOR: SCORE: 1664.3

## 2019-03-15 ASSESSMENT — ACTIVITIES OF DAILY LIVING (ADL): ADLS_ACUITY_SCORE: 24

## 2019-03-15 NOTE — PLAN OF CARE
VSS.  Pt denies any c/o of abd pain or nausea.  Up independently in room.  Tele is aflutter w/ cvr.  NS at 100ml/hr.  On clear liquid diet.  Wife and son updated at bedside.  Plan for surgery Sunday or Monday, elliquis stopped.  Plan to transfer to , report called to ROLAND Lundy.

## 2019-03-15 NOTE — PROGRESS NOTES
[facility]  Hospitalist Progress Note   03/15/2019          Assessment and Plan:     Pascual Perea is a 78 year old male  with medical history significant for diabetes, coronary artery disease, atrial flutter, hypothyroidism and hypertensionadmitted on 3/13/2019 with nausea vomiting and abdominal pain.     Abdominal pain likely from cholecystitis with sludge and with obstructed jaundice  Presented with nausea vomiting abdominal pain, leukocytosis and jaundice.  Have mild right-sided upper quadrant tenderness  US showed contracted gallbladder and sludge ball in gallbladder.    CT A/P unremarkable, no biliary dilatation.    EUS showed gallbladder sludge without choledocholithiasis  Cultures growing 2 out of 2 gram-negative rods.    Continue IV Zosyn.  Gastroenterology, general surgery following along, for cholecystectomy.  Advance diet per GI/ Surgery.  Continue IV fluids.  PRN pain medications/Zofran.    E. coli bacteremia likely from abdominal source--cleared  Blood cultures from admission 2/2 bottles growing E. Coli.  Repeat blood cultures no growth.  Follow final blood culture results.  Continue IV Zosyn as above.     Acute kidney injury likely prerenal:   Patient has normal baseline creatinine 0.8-0.9  Presented with creatinine of 2.36, with supportive care improving to 1.56.  Hold PTA lisinopril.  Continue IV fluids as above.    Mild hyperkalemia in the setting of acute kidney injury.    Now resolved continue to hold lisinopril      Atrial flutter:  Continue PTA amiodarone 20 mg oral daily.  Hold Eliquis for surgery planning for cholecystectomy  Continue telemetry monitoring.    Type 2 diabetes:  Hemoglobin A1c of 7.4  Blood sugars fluctuating between 140 -240.  Increase Levemir to 25 units at bedtime [PTA 35 to 40 units]  Continue sliding scale insulin.  Monitor blood sugars and optimize insulin regimen.     Hypothyroidism:   Continue PTA levothyroxine.    Dyslipidemia:    Hold PTA  pravastatin     Orders Placed This Encounter      Clear Liquid Diet      DVT Prophylaxis: Sequential compression device, ambulate, anticoagulation as above  Code Status: Full Code  Disposition: Expected discharge in 2 days pending clinical improvement.  Downgrade from [facility] to general medical floor.    Patient, interdisciplinary team involved in care and agrees with plan.  Total time >25 min. More than 50% of time spent in direct patient care, care coordination, patient counseling, and formalizing plan of care.     Anthony Cruz MD        Interval History:      Patient appears comfortable, sitting up in bed.  Denies any chest pain or shortness of breath.  No nausea, no vomiting.  Abdominal pain improving.  Tolerating clear liquid diet.  Has not been passing gas, has not had a bowel movement.  Currently on the IMC status, has not ambulated.         Physical Exam:        Physical Exam   Temp:  [97.4  F (36.3  C)-101  F (38.3  C)] 98  F (36.7  C)  Pulse:  [56-81] 58  Heart Rate:  [57-83] 58  Resp:  [11-30] 20  BP: ()/(47-78) 107/68  SpO2:  [95 %-100 %] 100 %    Intake/Output Summary (Last 24 hours) at 3/15/2019 1252  Last data filed at 3/15/2019 1200  Gross per 24 hour   Intake 4504 ml   Output 2000 ml   Net 2504 ml     Admission Weight: 88.9 kg (196 lb)  Current Weight: 91.7 kg (202 lb 3.2 oz)    PHYSICAL EXAM  GENERAL: Patient is in no distress. Alert and oriented.  HEART: Regular rate and rhythm. S1S2. No murmurs  LUNGS: Clear to auscultation bilaterally. No expiratory wheeze.  Respirations unlabored  ABDOMEN: Soft, no abdominal tenderness, bowel sounds heard   NEURO: Moving all extremities, no focal weakness.  EXTREMITIES: No pedal edema. 2+ peripheral pulses.  SKIN: Warm, dry. No rash or bruising.  PSYCHIATRY Cooperative       Medications:          amiodarone  200 mg Oral QAM     fexofenadine  180 mg Oral Daily     fluticasone  1 spray Both Nostrils Daily     insulin aspart  1-7 Units Subcutaneous TID AC      insulin aspart  1-5 Units Subcutaneous At Bedtime     insulin detemir  15 Units Subcutaneous At Bedtime     levothyroxine  125 mcg Oral Daily     piperacillin-tazobactam  3.375 g Intravenous Q6H     ranitidine  150 mg Oral Daily     sodium chloride (PF)  3 mL Intracatheter Q8H     glucose **OR** dextrose **OR** glucagon, HYDROmorphone, lidocaine 4%, lidocaine (buffered or not buffered), melatonin, naloxone, nitroGLYcerin, ondansetron **OR** ondansetron, prochlorperazine **OR** prochlorperazine **OR** prochlorperazine, sodium chloride (PF)         Data:      All new lab and imaging data was reviewed.

## 2019-03-15 NOTE — PROGRESS NOTES
Chart reviewed. Pt with likely mild cholecystitis with gallbladder wall thickening and sludge. Eliquis discontinued - would plan for laparoscopic cholecystectomy either Sunday or Monday. Follow up LFTs in am.     Janel Paz MD  [facility]  [phone]

## 2019-03-15 NOTE — PLAN OF CARE
A/O, VSS, O2sats 98% on 2 L, capnography WDL. BP 90s-120s/60s-70s, denies dizziness or lightheadedness. Tele afib CVR. Denies pain or nausea. Urine tea-colored. Passed dysphagia screen, clear liquid diet. Plan to continue IMC overnight.

## 2019-03-15 NOTE — PROGRESS NOTES
"[facility]  [facility]  Gastroenterology Progress note    Interval History:    Denies abdominal pain, nausea, vomiting.  Tolerating clear liquid diet.    Vital Signs:      /63   Pulse 58   Temp 97.6  F (36.4  C) (Oral)   Resp 20   Ht 1.803 m (5' 11\")   Wt 91.7 kg (202 lb 3.2 oz)   SpO2 100%   BMI 28.20 kg/m    Temp (24hrs), Av.5  F (37.5  C), Min:97.4  F (36.3  C), Max:101  F (38.3  C)    Patient Vitals for the past 72 hrs:   Weight   03/15/19 0536 91.7 kg (202 lb 3.2 oz)   19 1938 88.9 kg (196 lb)       Intake/Output Summary (Last 24 hours) at 3/15/2019 0940  Last data filed at 3/15/2019 0800  Gross per 24 hour   Intake 3804 ml   Output 1675 ml   Net 2129 ml         Constitutional: NAD, comfortable  Cardiovascular: RRR, normal S1, S2   Respiratory: CTAB  Abdomen: soft, non-tender, nondistended.    Additional Comments:  ROS, FH, SH: See initial GI consult for details.    Laboratory Data:  Recent Labs   Lab Test 03/15/19  0553 03/14/19  0847 19   WBC 5.8  --  10.2 20.7*   < > 7.5   HGB 13.1*  --  13.4 15.2   < > 14.0   MCV 85  --  84 85   < > 83   *  --  125* 164   < > 194   INR  --  1.37*  --   --   --  0.94    < > = values in this interval not displayed.     Recent Labs   Lab Test 03/15/19  0553 19  0719 19  01519    137  --  135   POTASSIUM 4.3 4.6 4.7 5.7*   CHLORIDE 106 107  --  103   CO2 21 23  --  25   BUN 30 33*  --  31*   CR 1.56* 1.93*  --  2.36*   ANIONGAP 8 7  --  7   STARR 8.2* 8.5  --  9.7     Recent Labs   Lab Test 03/15/19  0553 19  0719 19  2210 19  0831  12   ALBUMIN 2.7* 2.9*  --  3.5   < > 3.7   < > 4.1   BILITOTAL 5.0* 7.3*  --  8.5*   < > 0.7   < > 0.5   DBIL 4.3* 6.0*  --   --   --  0.2  --   --    * 199*  --  271*   < > 22   < > 29   AST 71* 137*  --  249*   < > 18   < > 28   ALKPHOS 101 104  --  127  "  < > 75   < > 52   PROTEIN  --   --  30*  --   --   --   --   --    LIPASE  --   --   --  121  --   --   --  70    < > = values in this interval not displayed.         Assessment:  77 yo male with a history of coronary artery disease, type 2 diabetes who presents with sudden onset of abdominal pain.  Blood work significant for elevated bilirubin, transaminases, trending down.  US shows contracted gallbladder and sludge ball in gallbladder.  CT A/P unremarkable, no biliary dilatation.  EUS showed gallbladder sludge without choledocholithiasis.  Suspect patient passed sludge given improving liver tests, sludge in gallbladder, GNR bacteremia and abdominal pain.  Plan:  -  Diet as tolerated.  -  Continue antibiotics.  -  Appreciate surgical input.  Eventual cholecystectomy recommended.  -  Will sign off; please call with further questions.        Ana Garcia [facility]  Office:  [phone] call if needed after 5PM  Cell:  [phone], not available after 5PM at this number

## 2019-03-15 NOTE — PROGRESS NOTES
Surgery Progress Note    S: Very comfortable today.  Wife and son here.    O:   Vitals:  BP  Min: 83/48  Max: 124/78  Temp  Av.5  F (37.5  C)  Min: 97.4  F (36.3  C)  Max: 101  F (38.3  C)  Pulse  Av.7  Min: 56  Max: 81  I/O last 3 completed shifts:  In: 3804 [P.O.:780; I.V.:3024]  Out: 1325 [Urine:1325]    Physical Exam:  Abd= soft, non-tender                             Decreasing icterus         TB=5.0  (admit= 8.5) And improving LFts   SCr=1.56   WBC=5.8 (admit=20.7)      Admit Blood Culture=E.Coli          Assessment/Plan: Suspect all due to cholecystitis with sluge.  He will need Lap. Odilia in next several days (would like to see continued improvement of LFTs).  On Zosyn for sepsis.  Will hold Eliquist starting now in preparation of surgery)  Clear liquids till surgery. Check LFts in AM.  Discussed with patient and family.      Wm. Radha MD

## 2019-03-15 NOTE — PLAN OF CARE
A/O, up SBA. VSS on RA. Tele: A.fib, HR 50's-60's. Pt denies abdominal pain/N/V. Tolerating clear liquid diet. Dark tea-colored urine. IV ABX given. NS running @100mL/hr. BG elevated.

## 2019-03-16 ENCOUNTER — ANESTHESIA EVENT (OUTPATIENT)
Dept: SURGERY | Facility: CLINIC | Age: 79
DRG: 418 | End: 2019-03-16
Payer: COMMERCIAL

## 2019-03-16 LAB
ALBUMIN SERPL-MCNC: 2.7 G/DL (ref 3.4–5)
ALBUMIN SERPL-MCNC: 2.8 G/DL (ref 3.4–5)
ALP SERPL-CCNC: 228 U/L (ref 40–150)
ALP SERPL-CCNC: 249 U/L (ref 40–150)
ALT SERPL W P-5'-P-CCNC: 163 U/L (ref 0–70)
ALT SERPL W P-5'-P-CCNC: 172 U/L (ref 0–70)
ANION GAP SERPL CALCULATED.3IONS-SCNC: 9 MMOL/L (ref 3–14)
AST SERPL W P-5'-P-CCNC: 102 U/L (ref 0–45)
AST SERPL W P-5'-P-CCNC: 106 U/L (ref 0–45)
BACTERIA SPEC CULT: ABNORMAL
BILIRUB DIRECT SERPL-MCNC: 2.8 MG/DL (ref 0–0.2)
BILIRUB DIRECT SERPL-MCNC: 2.8 MG/DL (ref 0–0.2)
BILIRUB SERPL-MCNC: 3.2 MG/DL (ref 0.2–1.3)
BILIRUB SERPL-MCNC: 3.4 MG/DL (ref 0.2–1.3)
BUN SERPL-MCNC: 19 MG/DL (ref 7–30)
CALCIUM SERPL-MCNC: 9.1 MG/DL (ref 8.5–10.1)
CHLORIDE SERPL-SCNC: 106 MMOL/L (ref 94–109)
CO2 SERPL-SCNC: 22 MMOL/L (ref 20–32)
CREAT SERPL-MCNC: 1.62 MG/DL (ref 0.66–1.25)
GFR SERPL CREATININE-BSD FRML MDRD: 40 ML/MIN/{1.73_M2}
GLUCOSE BLDC GLUCOMTR-MCNC: 114 MG/DL (ref 70–99)
GLUCOSE BLDC GLUCOMTR-MCNC: 130 MG/DL (ref 70–99)
GLUCOSE BLDC GLUCOMTR-MCNC: 152 MG/DL (ref 70–99)
GLUCOSE BLDC GLUCOMTR-MCNC: 63 MG/DL (ref 70–99)
GLUCOSE BLDC GLUCOMTR-MCNC: 84 MG/DL (ref 70–99)
GLUCOSE SERPL-MCNC: 200 MG/DL (ref 70–99)
LACTATE BLD-SCNC: 1 MMOL/L (ref 0.7–2)
LACTATE BLD-SCNC: 2.1 MMOL/L (ref 0.7–2)
Lab: ABNORMAL
Lab: ABNORMAL
MAGNESIUM SERPL-MCNC: 1.6 MG/DL (ref 1.6–2.3)
PHOSPHATE SERPL-MCNC: 2.4 MG/DL (ref 2.5–4.5)
POTASSIUM SERPL-SCNC: 4.2 MMOL/L (ref 3.4–5.3)
PROT SERPL-MCNC: 6 G/DL (ref 6.8–8.8)
PROT SERPL-MCNC: 6.1 G/DL (ref 6.8–8.8)
SODIUM SERPL-SCNC: 137 MMOL/L (ref 133–144)
SPECIMEN SOURCE: ABNORMAL
SPECIMEN SOURCE: ABNORMAL

## 2019-03-16 PROCEDURE — 99231 SBSQ HOSP IP/OBS SF/LOW 25: CPT | Performed by: SURGERY

## 2019-03-16 PROCEDURE — 82248 BILIRUBIN DIRECT: CPT | Performed by: HOSPITALIST

## 2019-03-16 PROCEDURE — 80076 HEPATIC FUNCTION PANEL: CPT | Performed by: SURGERY

## 2019-03-16 PROCEDURE — 80053 COMPREHEN METABOLIC PANEL: CPT | Performed by: HOSPITALIST

## 2019-03-16 PROCEDURE — 83735 ASSAY OF MAGNESIUM: CPT | Performed by: HOSPITALIST

## 2019-03-16 PROCEDURE — 25000128 H RX IP 250 OP 636: Performed by: INTERNAL MEDICINE

## 2019-03-16 PROCEDURE — 99232 SBSQ HOSP IP/OBS MODERATE 35: CPT | Mod: 57 | Performed by: SURGERY

## 2019-03-16 PROCEDURE — 83605 ASSAY OF LACTIC ACID: CPT | Performed by: HOSPITALIST

## 2019-03-16 PROCEDURE — 25000131 ZZH RX MED GY IP 250 OP 636 PS 637: Performed by: HOSPITALIST

## 2019-03-16 PROCEDURE — 36415 COLL VENOUS BLD VENIPUNCTURE: CPT | Performed by: HOSPITALIST

## 2019-03-16 PROCEDURE — 99232 SBSQ HOSP IP/OBS MODERATE 35: CPT | Performed by: HOSPITALIST

## 2019-03-16 PROCEDURE — 12000000 ZZH R&B MED SURG/OB

## 2019-03-16 PROCEDURE — 25800030 ZZH RX IP 258 OP 636: Performed by: INTERNAL MEDICINE

## 2019-03-16 PROCEDURE — 00000146 ZZHCL STATISTIC GLUCOSE BY METER IP

## 2019-03-16 PROCEDURE — 25000132 ZZH RX MED GY IP 250 OP 250 PS 637: Performed by: HOSPITALIST

## 2019-03-16 PROCEDURE — 84100 ASSAY OF PHOSPHORUS: CPT | Performed by: HOSPITALIST

## 2019-03-16 PROCEDURE — 25000132 ZZH RX MED GY IP 250 OP 250 PS 637: Performed by: INTERNAL MEDICINE

## 2019-03-16 PROCEDURE — 36415 COLL VENOUS BLD VENIPUNCTURE: CPT | Performed by: SURGERY

## 2019-03-16 PROCEDURE — 25000128 H RX IP 250 OP 636: Performed by: HOSPITALIST

## 2019-03-16 RX ORDER — NALOXONE HYDROCHLORIDE 0.4 MG/ML
.1-.4 INJECTION, SOLUTION INTRAMUSCULAR; INTRAVENOUS; SUBCUTANEOUS
Status: DISCONTINUED | OUTPATIENT
Start: 2019-03-16 | End: 2019-03-18 | Stop reason: HOSPADM

## 2019-03-16 RX ORDER — OXYCODONE HYDROCHLORIDE 5 MG/1
5 TABLET ORAL EVERY 4 HOURS PRN
Status: DISCONTINUED | OUTPATIENT
Start: 2019-03-16 | End: 2019-03-18 | Stop reason: HOSPADM

## 2019-03-16 RX ORDER — NICOTINE POLACRILEX 4 MG
15-30 LOZENGE BUCCAL
Status: DISCONTINUED | OUTPATIENT
Start: 2019-03-16 | End: 2019-03-16

## 2019-03-16 RX ORDER — DEXTROSE MONOHYDRATE 25 G/50ML
25-50 INJECTION, SOLUTION INTRAVENOUS
Status: DISCONTINUED | OUTPATIENT
Start: 2019-03-16 | End: 2019-03-16

## 2019-03-16 RX ADMIN — SODIUM CHLORIDE 500 ML: 9 INJECTION, SOLUTION INTRAVENOUS at 12:33

## 2019-03-16 RX ADMIN — SODIUM CHLORIDE: 9 INJECTION, SOLUTION INTRAVENOUS at 22:55

## 2019-03-16 RX ADMIN — PIPERACILLIN SODIUM,TAZOBACTAM SODIUM 3.38 G: 3; .375 INJECTION, POWDER, FOR SOLUTION INTRAVENOUS at 05:13

## 2019-03-16 RX ADMIN — LEVOTHYROXINE SODIUM 125 MCG: 100 TABLET ORAL at 07:57

## 2019-03-16 RX ADMIN — SODIUM CHLORIDE: 9 INJECTION, SOLUTION INTRAVENOUS at 03:24

## 2019-03-16 RX ADMIN — FLUTICASONE PROPIONATE 1 SPRAY: 50 SPRAY, METERED NASAL at 07:57

## 2019-03-16 RX ADMIN — PIPERACILLIN SODIUM,TAZOBACTAM SODIUM 3.38 G: 3; .375 INJECTION, POWDER, FOR SOLUTION INTRAVENOUS at 10:50

## 2019-03-16 RX ADMIN — RANITIDINE 150 MG: 150 TABLET ORAL at 07:58

## 2019-03-16 RX ADMIN — PIPERACILLIN SODIUM,TAZOBACTAM SODIUM 3.38 G: 3; .375 INJECTION, POWDER, FOR SOLUTION INTRAVENOUS at 17:45

## 2019-03-16 RX ADMIN — PIPERACILLIN SODIUM,TAZOBACTAM SODIUM 3.38 G: 3; .375 INJECTION, POWDER, FOR SOLUTION INTRAVENOUS at 22:09

## 2019-03-16 RX ADMIN — AMIODARONE HYDROCHLORIDE 200 MG: 200 TABLET ORAL at 07:58

## 2019-03-16 RX ADMIN — FEXOFENADINE HYDROCHLORIDE 180 MG: 180 TABLET, FILM COATED ORAL at 07:58

## 2019-03-16 RX ADMIN — INSULIN DETEMIR 20 UNITS: 100 INJECTION, SOLUTION SUBCUTANEOUS at 22:09

## 2019-03-16 ASSESSMENT — ACTIVITIES OF DAILY LIVING (ADL)
ADLS_ACUITY_SCORE: 24

## 2019-03-16 ASSESSMENT — ENCOUNTER SYMPTOMS: DYSRHYTHMIAS: 1

## 2019-03-16 NOTE — PROGRESS NOTES
SURGERY    Pascual Perea continues to improve.  No abdominal pain.  Has chronic cholecystitis with thickened gallbladder wall and sludge.  Suspect this was the etiology of his elevated bilirubin and liver function test.  As noted endoscopic ultrasound was negative for obstruction of the common duct.    [place] the patient would benefit from cholecystectomy.  This will help prevent recurrence.  My partner Dr. Paz plans to do this on 3-17-19 pending improvement of liver function tests.  Also we have been holding his Eliquis to in preparation for the surgery.    He does have a partial intrahepatic gallbladder which may make the surgery somewhat more difficult.  This is been discussed with the patient and his family.      Morning liver function tests are still pending.      Adalberto Garcia MD

## 2019-03-16 NOTE — ANESTHESIA PREPROCEDURE EVALUATION
Anesthesia Pre-Procedure Evaluation    Patient: Pascual Perea   MRN: [id number] : 1940          Preoperative Diagnosis: Unknown    Procedure(s):  LAPAROSCOPIC CHOLECYSTECTOMY    Past Medical History:   Diagnosis Date     Adhesive capsulitis of shoulder      Anemia 3/10/2014     Anemia, unspecified      Antiplatelet or antithrombotic long-term use      Arrhythmia     Atrial fib/flutter     CAD (coronary artery disease)      History of cardioversion 2018    a single synchronized shock of 120 joules restored normal sinus rhythm     History of cardioversion 2019    single shock , 200 joules successul in restoring NSR     Hyperlipidemia LDL goal <70 2011     Hypertension      Hypothyroidism      Impotence of organic origin      Laceration of finger  2010     left thumb s/p sutures     Numbness and tingling     diabetic neuropathy bilateral feet     BRANDON (obstructive sleep apnea)     intolerant of CPAP, uses it occasionally.  Using mandibular device occasionally     Osteopenia      Pulmonary hypertension (H) 2012     Sensorineural hearing loss, unspecified      Stented coronary artery     CABG      Testosterone deficiency      Type 2 diabetes mellitus without complication  (goal A1C<8) 10/24/2015     Typical atrial flutter (H) 16     Unspecified hypothyroidism      Vitamin D deficiency 9/3/2011     Past Surgical History:   Procedure Laterality Date     ANESTHESIA CARDIOVERSION N/A 2019    Procedure: ANESTHESIA CARDIOVERSION;  Surgeon: GENERIC ANESTHESIA PROVIDER;  Location: [facility]     C NONSPECIFIC PROCEDURE      CABG ([place])     C NONSPECIFIC PROCEDURE      stress echo     CARDIAC SURGERY      bypass in      CARDIOVERSION  2018     COLONOSCOPY       CORONARY ANGIOGRAPHY ADULT ORDER      2012     CORONARY ARTERY BYPASS      [facility]     EXCISE MASS HEAD Left 2016    Procedure: EXCISE MASS HEAD;  Surgeon: Mary  Ivan SOSA MD;  Location: [facility]     HEART CATH, ANGIOPLASTY  2012     PHACOEMULSIFICATION CLEAR CORNEA WITH STANDARD INTRAOCULAR LENS IMPLANT Left 2015    Procedure: PHACOEMULSIFICATION CLEAR CORNEA WITH STANDARD INTRAOCULAR LENS IMPLANT;  Surgeon: Nixon Farnsworth MD;  Location: [facility]     PHACOEMULSIFICATION CLEAR CORNEA WITH STANDARD INTRAOCULAR LENS IMPLANT Right 2015    Procedure: PHACOEMULSIFICATION CLEAR CORNEA WITH STANDARD INTRAOCULAR LENS IMPLANT;  Surgeon: Nixon Farnsworth MD;  Location: [facility]     SEPTOPLASTY, TURBINOPLASTY, COMBINED Bilateral 2016    Procedure: COMBINED SEPTOPLASTY, TURBINOPLASTY;  Surgeon: Ivan Hernandez MD;  Location: [facility]       Anesthesia Evaluation     . Pt has had prior anesthetic. Type: General    No history of anesthetic complications          ROS/MED HX    ENT/Pulmonary:     (+)sleep apnea, doesn't use CPAP , . .    Neurologic:     (+)neuropathy     Cardiovascular: Comment: [id number]  [id number]  [id number]^MAJOR^LAKSHMI^A        [facility]  Echocardiography Laboratory  [address] [place] [address]      Name: JESÚS VASQUEZ  MRN: [id number]  : 1940  Study Date: 2018 09:42 AM  Age: 77 yrs  Gender: Male  Patient Location: [facility]  Show more    Reason For Study: CAD  Ordering Physician: LAKSHMI GONSALVES  Referring Physician: Andrew Elizalde  Performed By: Liana Canales,    BSA: 2.2 m2  Height: 71 in  Weight: 213 lb  HR: 87  BP: 147/76 mmHg  _____________________________________________________________________________  __      Procedure  Complete Portable Echo Adult.  _____________________________________________________________________________  __      Interpretation Summary    The left ventricle is normal in size. There is normal left ventricular wall  thickness. Left ventricular systolic function is normal. The visual ejection  fraction is estimated at 55-60%. Left ventricular diastolic function  is  indeterminate. No regional wall motion abnormalities noted.  The right ventricle is mildly dilated. Mildly decreased right ventricular  systolic function.  There is mild-moderate biatrial enlargement. There is no color Doppler  evidence of an atrial shunt.  Trace mitral and tricuspid regurgitation.  No pericardial effusion.  In direct comparison to the previous study dated 05/20/2016, the findings are  similar.  _____________________________________________________________________________  __      Left Ventricle  The left ventricle is normal in size. There is normal left ventricular wall  thickness. Left ventricular systolic function is normal. The visual ejection  fraction is estimated at 55-60%. Left ventricular diastolic function is  indeterminate. No regional wall motion abnormalities noted.          (+) Dyslipidemia, hypertension--CAD, -CABG-. Taking blood thinners : . . . :. dysrhythmias a-flutter and a-fib, . pulmonary hypertension, Previous cardiac testing Echodate:results:4/18/2018 11:13 AM JESÚS VASQUEZ 77 years Male 1940.     Indication/Clinical History: Increased troponins     Impression  1. Myocardial perfusion imaging using single isotope technique  demonstrated a small basal inferior infarct with small area of mild  ischemia in the inferior mid ventricle  There is a very small possible apical nontransmural infarct with  minimal mikey-infarct ischemia (previously described).   2. Gated images demonstrated basal inferior hypokinesis. The left  ventricular systolic function is 72% at rest and 50% post stress.  3. Compared to the prior study from 4/15/2016, prior study described a  very small fixed apical defect possibly representing nontransmural  infarct with an ejection fraction of 61%.date: results: date: results: date: results:          METS/Exercise Tolerance:     Hematologic:     (+) Anemia, -      Musculoskeletal:         GI/Hepatic:     (+) GERD Other GI/Hepatic acute liver  "dysfunction from cholecystitis     (-) liver disease   Renal/Genitourinary:     (+) Other Renal/ Genitourinary, Resolving JEY   (-) renal disease   Endo:     (+) type II DM thyroid problem hypothyroidism, .   (-) Type I DM   Psychiatric:         Infectious Disease:         Malignancy:         Other:                          Physical Exam      Airway   Mallampati: II  TM distance: >3 FB  Neck ROM: full    Dental   (+) chipped  Comment: Chipped upper incisor    Cardiovascular   Rhythm and rate: irregular and normal  (-) no murmur    Pulmonary    breath sounds clear to auscultation            Lab Results   Component Value Date    WBC 5.8 03/15/2019    HGB 13.1 (L) 03/15/2019    HCT 38.8 (L) 03/15/2019     (L) 03/15/2019    SED 4 02/24/2017     03/16/2019    POTASSIUM 4.2 03/16/2019    CHLORIDE 106 03/16/2019    CO2 22 03/16/2019    BUN 19 03/16/2019    CR 1.62 (H) 03/16/2019     (H) 03/16/2019    STARR 9.1 03/16/2019    PHOS 2.4 (L) 03/16/2019    MAG 1.6 03/16/2019    ALBUMIN 2.7 (L) 03/16/2019    PROTTOTAL 6.0 (L) 03/16/2019     (H) 03/16/2019     (H) 03/16/2019    ALKPHOS 249 (H) 03/16/2019    BILITOTAL 3.2 (H) 03/16/2019    LIPASE 121 03/13/2019    PTT 52 (H) 04/18/2018    INR 1.37 (H) 03/14/2019    TSH 0.99 08/13/2018    T4 0.82 04/15/2016       Preop Vitals  BP Readings from Last 3 Encounters:   03/16/19 152/89   03/13/19 94/56   02/13/19 152/90    Pulse Readings from Last 3 Encounters:   03/16/19 59   03/13/19 63   02/13/19 53      Resp Readings from Last 3 Encounters:   03/16/19 18   02/13/19 18   12/04/18 16    SpO2 Readings from Last 3 Encounters:   03/16/19 95%   03/13/19 94%   02/13/19 98%      Temp Readings from Last 1 Encounters:   03/16/19 36.6  C (97.9  F) (Oral)    Ht Readings from Last 1 Encounters:   03/13/19 1.803 m (5' 11\")      Wt Readings from Last 1 Encounters:   03/15/19 91.7 kg (202 lb 3.2 oz)    Estimated body mass index is 28.2 kg/m  as calculated from the " "following:    Height as of this encounter: 1.803 m (5' 11\").    Weight as of this encounter: 91.7 kg (202 lb 3.2 oz).       Anesthesia Plan      History & Physical Review  History and physical reviewed and following examination; no interval change.    ASA Status:  3 .    NPO Status:  > 8 hours    Plan for General and ETT with Intravenous induction. Maintenance will be Balanced.    PONV prophylaxis:  Ondansetron (or other 5HT-3) and Meclizine or Dimenhydrinate  Benadryl 12.5 mg IV      Postoperative Care  Postoperative pain management:  Multi-modal analgesia.      Consents  Anesthetic plan, risks, benefits and alternatives discussed with:  Patient..                 Oscar Mejia MD  "

## 2019-03-16 NOTE — PLAN OF CARE
Pt A/O x 4. No complaints of pain. NPO @ midnight for cholecystectomy. Continue IV antx. 500 ml bolus received for lactic of 2.1. Up ind. Tolerating clears. Will continue to monitor.

## 2019-03-16 NOTE — PROGRESS NOTES
"General Surgery Progress Note    Subjective: pt with mild RUQ pain with pressure. No nausea. Feels hungry.     Objective: /87 (BP Location: Right arm)   Pulse 59   Temp 97.8  F (36.6  C) (Oral)   Resp 18   Ht 1.803 m (5' 11\")   Wt 91.7 kg (202 lb 3.2 oz)   SpO2 95%   BMI 28.20 kg/m    Gen: alert, no distress  CV: RRR  Pulm: nonlabored breathing  Abd: soft, mildly tender in RUQ with deep palpation  Ext: WWP    Assessment/Plan:   Pascual Perea  is a 78 year old male with cholecystitis. LFTs continue to improve. We discussed laparoscopic cholecystectomy in detail. Plan for surgery tomorrow morning. Eliquis will have been held for 48hrs at that time.   - clear liquids today  - NPO midnight  - continue zosyn for cholecystitis  - appreciate hospital assistance  - am labs with LFTs    Janel Paz MD  [facility]  [phone]              "

## 2019-03-16 NOTE — PLAN OF CARE
Care Plan Summary Note:  ORIENTATION/BEHAVIOR: Alert & oriented x 4, calm and cooperative    ABNL VS/O2: WDL    MOBILITY/FALL RISK: Independent    PAIN MANAGMENT: Denies Pain    DIET: Clear    BOWEL/BLADDER: Uses urinal, urine jay color    ABNL LAB/BG: Phos 2.4, , AST 71,  & 114.    DRAIN/DEVICES: PIV Infusing    SKIN:Slightly jaundice    TESTS/PROCEDURES: Possible Lab cholecystomy on Sunday or Monday    AGGRESSION TOOL COLOR: Green    D/C DAY/GOALS/PLACE:Pending    OTHER: Tele Aflutter with slow ventricular rate, denies abdominal pain, nausea and vomiting. Bowel sound active, flatus present.

## 2019-03-16 NOTE — PROVIDER NOTIFICATION
MD Notification    Notified Person: MD    Notified Person Name: Nancy    Notification Date/Time: March 16, 2019 10:39 AM    Notification Interaction: paged    Purpose of Notification: FYI: pt lactic 2.1    Orders Received:    Comments:

## 2019-03-16 NOTE — PROGRESS NOTES
[facility]  Hospitalist Progress Note   03/16/2019          Assessment and Plan:       Pascual Perea is a 78 year old male  with medical history significant for diabetes, coronary artery disease, atrial flutter, hypothyroidism and hypertensionadmitted on 3/13/2019 with nausea vomiting and abdominal pain.     Abdominal pain likely from acute cholecystitis with sludge and with obstructed jaundice  Acute liver injury /lactic acidosis from above.  Presented with nausea vomiting abdominal pain, leukocytosis and jaundice.  Has mild right-sided upper quadrant tenderness improving.  , , bilirubin 8.5.  US showed contracted gallbladder and sludge ball in gallbladder.    CT abdomen and pelvis unremarkable, no biliary dilatation.    EUS showed gallbladder sludge without choledocholithiasis  Blood cultures from admission grew E. Coli.  Repeat blood cultures no growth.    Continue IV Zosyn (3/13)  Gastroenterology, general surgery following along, plan for cholecystectomy 3/17.  Appreciate GI/general surgery comanagement.  Continue clear liquid diet, n.p.o. after midnight for surgery a.m.  Continue IV NS at 100 mL/h, 500 cc bolus this morning.  Follow lactic acid levels/liver function tests closely.  PRN IV Dilaudid for severe pain, oral oxycodone for mild to moderate pain.  PRN IV/p.o. Zofran.  GI prophylaxis with Ranitidine.    E. coli bacteremia likely from abdominal source--cleared  Blood cultures from 3/13 grew E. Coli.  Urine culture 10,000 to 50,000 colonies/mL  Escherichia coli   Blood cultures repeat from 3/14 no growth to date.  Follow final blood culture results.  Continue IV Zosyn as above, will likely de-escalate antibiotics after surgery.    Thrombocytopenia likely dilutional/in the setting of infection  Platelets dropped from 164 to 101K, no bleeding noted.  Has been receiving IV fluids/IV antibiotics.  Continue to monitor platelet count.     Acute kidney injury likely prerenal:    Patient has normal baseline creatinine 0.8-0.9  Presented with creatinine of 2.36 with supportive care trending down to 1.62.  Continue to hold PTA lisinopril.  Continue IV fluids as above.  Monitor renal function closely.  Avoid nephrotoxic drugs.    Mild hyperkalemia in the setting of acute kidney injury.    Now resolved continue to hold lisinopril     Atrial flutter:  Continue PTA amiodarone 20 mg oral daily.  Hold Eliquis planning for cholecystectomy tomorrow.  Continue telemetry monitoring (A flutter )  PRN IV metoprolol ordered.    Hypertension.  Hold PTA lisinopril given acute kidney injury.  PRN IV hydralazine ordered.  Systolic blood pressures fluctuating between 100s-130s.    Type 2 diabetes:  Hemoglobin A1c of 7.4  Blood sugars fluctuating between 140 -240.  Decrease Levemir to 20 units at bedtime [PTA 35 to 40 units]  Switch to high intensity sliding scale insulin.  Monitor blood sugars and optimize insulin regimen.     Hypothyroidism:   Continue PTA levothyroxine.    Dyslipidemia:    Hold PTA pravastatin     Orders Placed This Encounter      Clear Liquid Diet      NPO per Anesthesia Guidelines for Procedure/Surgery Except for: Meds      DVT Prophylaxis: Sequential compression device, ambulate  Code Status: Full Code  Disposition: Expected discharge in 1- 2 days pending clinical improvement.    Patient, interdisciplinary team involved in care and agrees with plan.  Total time >25 min. More than 50% of time spent in direct patient care, care coordination, patient counseling, and formalizing plan of care.     Anthony Cruz MD        Interval History:      Patient appears comfortable, sitting up in bed.  Denies any chest pain or shortness of breath.  No nausea, no vomiting.  Abdominal pain improving.  Tolerating clear liquid diet.  Has not had a bowel movement yet.         Physical Exam:        Physical Exam   Temp:  [97.5  F (36.4  C)-98.4  F (36.9  C)] 97.8  F (36.6  C)  Pulse:  [58-71] 59  Heart  Rate:  [57-79] 58  Resp:  [18] 18  BP: (129-151)/(76-87) 151/87  SpO2:  [95 %-100 %] 95 %    Intake/Output Summary (Last 24 hours) at 3/15/2019 1252  Last data filed at 3/15/2019 1200  Gross per 24 hour   Intake 4504 ml   Output 2000 ml   Net 2504 ml     Admission Weight: 88.9 kg (196 lb)  Current Weight: 91.7 kg (202 lb 3.2 oz)    PHYSICAL EXAM  GENERAL: Patient is in no distress. Alert and oriented.  HEART: irregular rate and rhythm. S1S2. No murmurs  LUNGS: Clear to auscultation bilaterally. No expiratory wheeze.  ABDOMEN: Soft, no abdominal tenderness, bowel sounds heard   NEURO: Moving all extremities, no focal weakness.  EXTREMITIES: No pedal edema. 2+ peripheral pulses.  SKIN: Warm, dry. No rash or bruising.  PSYCHIATRY Cooperative       Medications:          sodium chloride 0.9%  500 mL Intravenous Once     amiodarone  200 mg Oral QAM     fexofenadine  180 mg Oral Daily     fluticasone  1 spray Both Nostrils Daily     insulin aspart  1-7 Units Subcutaneous TID AC     insulin aspart  1-5 Units Subcutaneous At Bedtime     insulin detemir  25 Units Subcutaneous At Bedtime     levothyroxine  125 mcg Oral Daily     piperacillin-tazobactam  3.375 g Intravenous Q6H     ranitidine  150 mg Oral Daily     sodium chloride (PF)  3 mL Intracatheter Q8H     glucose **OR** dextrose **OR** glucagon, HYDROmorphone, melatonin, ondansetron **OR** ondansetron, prochlorperazine **OR** prochlorperazine **OR** prochlorperazine         Data:      All new lab and imaging data was reviewed.

## 2019-03-17 ENCOUNTER — ANESTHESIA (OUTPATIENT)
Dept: SURGERY | Facility: CLINIC | Age: 79
DRG: 418 | End: 2019-03-17
Payer: COMMERCIAL

## 2019-03-17 LAB
ALBUMIN SERPL-MCNC: 2.6 G/DL (ref 3.4–5)
ALBUMIN UR-MCNC: 10 MG/DL
ALP SERPL-CCNC: 320 U/L (ref 40–150)
ALT SERPL W P-5'-P-CCNC: 171 U/L (ref 0–70)
ANION GAP SERPL CALCULATED.3IONS-SCNC: 5 MMOL/L (ref 3–14)
APPEARANCE UR: CLEAR
AST SERPL W P-5'-P-CCNC: 86 U/L (ref 0–45)
BILIRUB SERPL-MCNC: 2.6 MG/DL (ref 0.2–1.3)
BILIRUB UR QL STRIP: ABNORMAL
BUN SERPL-MCNC: 15 MG/DL (ref 7–30)
CALCIUM SERPL-MCNC: 8.8 MG/DL (ref 8.5–10.1)
CHLORIDE SERPL-SCNC: 111 MMOL/L (ref 94–109)
CK SERPL-CCNC: 34 U/L (ref 30–300)
CO2 SERPL-SCNC: 25 MMOL/L (ref 20–32)
COLOR UR AUTO: ABNORMAL
CREAT SERPL-MCNC: 1.49 MG/DL (ref 0.66–1.25)
ERYTHROCYTE [DISTWIDTH] IN BLOOD BY AUTOMATED COUNT: 15.8 % (ref 10–15)
GFR SERPL CREATININE-BSD FRML MDRD: 44 ML/MIN/{1.73_M2}
GLUCOSE BLDC GLUCOMTR-MCNC: 110 MG/DL (ref 70–99)
GLUCOSE BLDC GLUCOMTR-MCNC: 113 MG/DL (ref 70–99)
GLUCOSE BLDC GLUCOMTR-MCNC: 114 MG/DL (ref 70–99)
GLUCOSE BLDC GLUCOMTR-MCNC: 219 MG/DL (ref 70–99)
GLUCOSE BLDC GLUCOMTR-MCNC: 291 MG/DL (ref 70–99)
GLUCOSE BLDC GLUCOMTR-MCNC: 39 MG/DL (ref 70–99)
GLUCOSE BLDC GLUCOMTR-MCNC: 53 MG/DL (ref 70–99)
GLUCOSE BLDC GLUCOMTR-MCNC: 70 MG/DL (ref 70–99)
GLUCOSE BLDC GLUCOMTR-MCNC: 80 MG/DL (ref 70–99)
GLUCOSE BLDC GLUCOMTR-MCNC: 89 MG/DL (ref 70–99)
GLUCOSE SERPL-MCNC: 50 MG/DL (ref 70–99)
GLUCOSE UR STRIP-MCNC: 30 MG/DL
HCT VFR BLD AUTO: 39.2 % (ref 40–53)
HGB BLD-MCNC: 13.1 G/DL (ref 13.3–17.7)
HGB UR QL STRIP: NEGATIVE
INR PPP: 1.09 (ref 0.86–1.14)
KETONES UR STRIP-MCNC: 10 MG/DL
LEUKOCYTE ESTERASE UR QL STRIP: ABNORMAL
MCH RBC QN AUTO: 28.5 PG (ref 26.5–33)
MCHC RBC AUTO-ENTMCNC: 33.4 G/DL (ref 31.5–36.5)
MCV RBC AUTO: 85 FL (ref 78–100)
NITRATE UR QL: NEGATIVE
PH UR STRIP: 5.5 PH (ref 5–7)
PLATELET # BLD AUTO: 119 10E9/L (ref 150–450)
POTASSIUM SERPL-SCNC: 3.7 MMOL/L (ref 3.4–5.3)
PROT SERPL-MCNC: 5.8 G/DL (ref 6.8–8.8)
RBC # BLD AUTO: 4.6 10E12/L (ref 4.4–5.9)
RBC #/AREA URNS AUTO: 1 /HPF (ref 0–2)
SODIUM SERPL-SCNC: 141 MMOL/L (ref 133–144)
SOURCE: ABNORMAL
SP GR UR STRIP: 1.02 (ref 1–1.03)
UROBILINOGEN UR STRIP-MCNC: 2 MG/DL (ref 0–2)
WBC # BLD AUTO: 6.1 10E9/L (ref 4–11)
WBC #/AREA URNS AUTO: 4 /HPF (ref 0–5)

## 2019-03-17 PROCEDURE — 25000125 ZZHC RX 250: Performed by: NURSE ANESTHETIST, CERTIFIED REGISTERED

## 2019-03-17 PROCEDURE — 25000131 ZZH RX MED GY IP 250 OP 636 PS 637: Performed by: HOSPITALIST

## 2019-03-17 PROCEDURE — 0FT44ZZ RESECTION OF GALLBLADDER, PERCUTANEOUS ENDOSCOPIC APPROACH: ICD-10-PCS | Performed by: SURGERY

## 2019-03-17 PROCEDURE — 0F944ZZ DRAINAGE OF GALLBLADDER, PERCUTANEOUS ENDOSCOPIC APPROACH: ICD-10-PCS | Performed by: SURGERY

## 2019-03-17 PROCEDURE — 88304 TISSUE EXAM BY PATHOLOGIST: CPT | Performed by: SURGERY

## 2019-03-17 PROCEDURE — 25800025 ZZH RX 258: Performed by: SURGERY

## 2019-03-17 PROCEDURE — 12000000 ZZH R&B MED SURG/OB

## 2019-03-17 PROCEDURE — 25800030 ZZH RX IP 258 OP 636: Performed by: INTERNAL MEDICINE

## 2019-03-17 PROCEDURE — 85027 COMPLETE CBC AUTOMATED: CPT | Performed by: HOSPITALIST

## 2019-03-17 PROCEDURE — 81001 URINALYSIS AUTO W/SCOPE: CPT | Performed by: HOSPITALIST

## 2019-03-17 PROCEDURE — 27210794 ZZH OR GENERAL SUPPLY STERILE: Performed by: SURGERY

## 2019-03-17 PROCEDURE — 80053 COMPREHEN METABOLIC PANEL: CPT | Performed by: HOSPITALIST

## 2019-03-17 PROCEDURE — 47562 LAPAROSCOPIC CHOLECYSTECTOMY: CPT | Performed by: SURGERY

## 2019-03-17 PROCEDURE — 25800030 ZZH RX IP 258 OP 636: Performed by: ANESTHESIOLOGY

## 2019-03-17 PROCEDURE — 37000009 ZZH ANESTHESIA TECHNICAL FEE, EACH ADDTL 15 MIN: Performed by: SURGERY

## 2019-03-17 PROCEDURE — 82550 ASSAY OF CK (CPK): CPT | Performed by: HOSPITALIST

## 2019-03-17 PROCEDURE — 25000128 H RX IP 250 OP 636: Performed by: INTERNAL MEDICINE

## 2019-03-17 PROCEDURE — 25000128 H RX IP 250 OP 636: Performed by: NURSE ANESTHETIST, CERTIFIED REGISTERED

## 2019-03-17 PROCEDURE — 25800025 ZZH RX 258: Performed by: NURSE ANESTHETIST, CERTIFIED REGISTERED

## 2019-03-17 PROCEDURE — 47562 LAPAROSCOPIC CHOLECYSTECTOMY: CPT | Mod: AS | Performed by: PHYSICIAN ASSISTANT

## 2019-03-17 PROCEDURE — 99232 SBSQ HOSP IP/OBS MODERATE 35: CPT | Performed by: HOSPITALIST

## 2019-03-17 PROCEDURE — 88304 TISSUE EXAM BY PATHOLOGIST: CPT | Mod: 26 | Performed by: SURGERY

## 2019-03-17 PROCEDURE — 25000566 ZZH SEVOFLURANE, EA 15 MIN: Performed by: SURGERY

## 2019-03-17 PROCEDURE — 36415 COLL VENOUS BLD VENIPUNCTURE: CPT | Performed by: HOSPITALIST

## 2019-03-17 PROCEDURE — 25800025 ZZH RX 258: Performed by: INTERNAL MEDICINE

## 2019-03-17 PROCEDURE — 25000132 ZZH RX MED GY IP 250 OP 250 PS 637: Performed by: HOSPITALIST

## 2019-03-17 PROCEDURE — 00000146 ZZHCL STATISTIC GLUCOSE BY METER IP

## 2019-03-17 PROCEDURE — 85610 PROTHROMBIN TIME: CPT | Performed by: HOSPITALIST

## 2019-03-17 PROCEDURE — 25000132 ZZH RX MED GY IP 250 OP 250 PS 637: Performed by: INTERNAL MEDICINE

## 2019-03-17 PROCEDURE — 25000125 ZZHC RX 250: Performed by: SURGERY

## 2019-03-17 PROCEDURE — 36000056 ZZH SURGERY LEVEL 3 1ST 30 MIN: Performed by: SURGERY

## 2019-03-17 PROCEDURE — 40000170 ZZH STATISTIC PRE-PROCEDURE ASSESSMENT II: Performed by: SURGERY

## 2019-03-17 PROCEDURE — 37000008 ZZH ANESTHESIA TECHNICAL FEE, 1ST 30 MIN: Performed by: SURGERY

## 2019-03-17 PROCEDURE — 36000058 ZZH SURGERY LEVEL 3 EA 15 ADDTL MIN: Performed by: SURGERY

## 2019-03-17 PROCEDURE — 71000012 ZZH RECOVERY PHASE 1 LEVEL 1 FIRST HR: Performed by: SURGERY

## 2019-03-17 RX ORDER — SODIUM CHLORIDE, SODIUM LACTATE, POTASSIUM CHLORIDE, CALCIUM CHLORIDE 600; 310; 30; 20 MG/100ML; MG/100ML; MG/100ML; MG/100ML
INJECTION, SOLUTION INTRAVENOUS CONTINUOUS
Status: DISCONTINUED | OUTPATIENT
Start: 2019-03-17 | End: 2019-03-17 | Stop reason: HOSPADM

## 2019-03-17 RX ORDER — PROPOFOL 10 MG/ML
INJECTION, EMULSION INTRAVENOUS PRN
Status: DISCONTINUED | OUTPATIENT
Start: 2019-03-17 | End: 2019-03-17

## 2019-03-17 RX ORDER — DEXTROSE MONOHYDRATE 25 G/50ML
INJECTION, SOLUTION INTRAVENOUS PRN
Status: DISCONTINUED | OUTPATIENT
Start: 2019-03-17 | End: 2019-03-17

## 2019-03-17 RX ORDER — FENTANYL CITRATE 50 UG/ML
INJECTION, SOLUTION INTRAMUSCULAR; INTRAVENOUS PRN
Status: DISCONTINUED | OUTPATIENT
Start: 2019-03-17 | End: 2019-03-17

## 2019-03-17 RX ORDER — NALOXONE HYDROCHLORIDE 0.4 MG/ML
.1-.4 INJECTION, SOLUTION INTRAMUSCULAR; INTRAVENOUS; SUBCUTANEOUS
Status: ACTIVE | OUTPATIENT
Start: 2019-03-17 | End: 2019-03-18

## 2019-03-17 RX ORDER — GLYCOPYRROLATE 0.2 MG/ML
INJECTION, SOLUTION INTRAMUSCULAR; INTRAVENOUS PRN
Status: DISCONTINUED | OUTPATIENT
Start: 2019-03-17 | End: 2019-03-17

## 2019-03-17 RX ORDER — EPHEDRINE SULFATE 50 MG/ML
INJECTION, SOLUTION INTRAMUSCULAR; INTRAVENOUS; SUBCUTANEOUS PRN
Status: DISCONTINUED | OUTPATIENT
Start: 2019-03-17 | End: 2019-03-17

## 2019-03-17 RX ORDER — ONDANSETRON 2 MG/ML
4 INJECTION INTRAMUSCULAR; INTRAVENOUS EVERY 30 MIN PRN
Status: DISCONTINUED | OUTPATIENT
Start: 2019-03-17 | End: 2019-03-17 | Stop reason: HOSPADM

## 2019-03-17 RX ORDER — ONDANSETRON 2 MG/ML
INJECTION INTRAMUSCULAR; INTRAVENOUS PRN
Status: DISCONTINUED | OUTPATIENT
Start: 2019-03-17 | End: 2019-03-17

## 2019-03-17 RX ORDER — MAGNESIUM HYDROXIDE 1200 MG/15ML
LIQUID ORAL PRN
Status: DISCONTINUED | OUTPATIENT
Start: 2019-03-17 | End: 2019-03-17 | Stop reason: HOSPADM

## 2019-03-17 RX ORDER — LIDOCAINE HYDROCHLORIDE 20 MG/ML
INJECTION, SOLUTION INFILTRATION; PERINEURAL PRN
Status: DISCONTINUED | OUTPATIENT
Start: 2019-03-17 | End: 2019-03-17

## 2019-03-17 RX ORDER — ONDANSETRON 4 MG/1
4 TABLET, ORALLY DISINTEGRATING ORAL EVERY 30 MIN PRN
Status: DISCONTINUED | OUTPATIENT
Start: 2019-03-17 | End: 2019-03-17 | Stop reason: HOSPADM

## 2019-03-17 RX ORDER — FENTANYL CITRATE 50 UG/ML
25-50 INJECTION, SOLUTION INTRAMUSCULAR; INTRAVENOUS
Status: DISCONTINUED | OUTPATIENT
Start: 2019-03-17 | End: 2019-03-17 | Stop reason: HOSPADM

## 2019-03-17 RX ORDER — HYDROMORPHONE HYDROCHLORIDE 1 MG/ML
.3-.5 INJECTION, SOLUTION INTRAMUSCULAR; INTRAVENOUS; SUBCUTANEOUS EVERY 5 MIN PRN
Status: DISCONTINUED | OUTPATIENT
Start: 2019-03-17 | End: 2019-03-17 | Stop reason: HOSPADM

## 2019-03-17 RX ORDER — DEXAMETHASONE SODIUM PHOSPHATE 4 MG/ML
INJECTION, SOLUTION INTRA-ARTICULAR; INTRALESIONAL; INTRAMUSCULAR; INTRAVENOUS; SOFT TISSUE PRN
Status: DISCONTINUED | OUTPATIENT
Start: 2019-03-17 | End: 2019-03-17

## 2019-03-17 RX ORDER — FENTANYL CITRATE 50 UG/ML
50-100 INJECTION, SOLUTION INTRAMUSCULAR; INTRAVENOUS
Status: DISCONTINUED | OUTPATIENT
Start: 2019-03-17 | End: 2019-03-17 | Stop reason: HOSPADM

## 2019-03-17 RX ORDER — NEOSTIGMINE METHYLSULFATE 1 MG/ML
VIAL (ML) INJECTION PRN
Status: DISCONTINUED | OUTPATIENT
Start: 2019-03-17 | End: 2019-03-17

## 2019-03-17 RX ADMIN — SODIUM CHLORIDE: 9 INJECTION, SOLUTION INTRAVENOUS at 13:51

## 2019-03-17 RX ADMIN — LIDOCAINE HYDROCHLORIDE 100 MG: 20 INJECTION, SOLUTION INFILTRATION; PERINEURAL at 09:42

## 2019-03-17 RX ADMIN — Medication 5 MG: at 09:48

## 2019-03-17 RX ADMIN — DEXTROSE MONOHYDRATE 25 ML: 500 INJECTION PARENTERAL at 07:57

## 2019-03-17 RX ADMIN — OXYCODONE HYDROCHLORIDE 5 MG: 5 TABLET ORAL at 22:51

## 2019-03-17 RX ADMIN — FEXOFENADINE HYDROCHLORIDE 180 MG: 180 TABLET, FILM COATED ORAL at 13:31

## 2019-03-17 RX ADMIN — FLUTICASONE PROPIONATE 1 SPRAY: 50 SPRAY, METERED NASAL at 13:48

## 2019-03-17 RX ADMIN — DEXAMETHASONE SODIUM PHOSPHATE 4 MG: 4 INJECTION, SOLUTION INTRA-ARTICULAR; INTRALESIONAL; INTRAMUSCULAR; INTRAVENOUS; SOFT TISSUE at 09:46

## 2019-03-17 RX ADMIN — FENTANYL CITRATE 100 MCG: 50 INJECTION, SOLUTION INTRAMUSCULAR; INTRAVENOUS at 09:42

## 2019-03-17 RX ADMIN — DEXTROSE MONOHYDRATE 25 ML: 500 INJECTION PARENTERAL at 06:38

## 2019-03-17 RX ADMIN — RANITIDINE 150 MG: 150 TABLET ORAL at 13:31

## 2019-03-17 RX ADMIN — AMIODARONE HYDROCHLORIDE 200 MG: 200 TABLET ORAL at 13:32

## 2019-03-17 RX ADMIN — SODIUM CHLORIDE: 9 INJECTION, SOLUTION INTRAVENOUS at 15:49

## 2019-03-17 RX ADMIN — ROCURONIUM BROMIDE 40 MG: 10 INJECTION INTRAVENOUS at 09:42

## 2019-03-17 RX ADMIN — SODIUM CHLORIDE, POTASSIUM CHLORIDE, SODIUM LACTATE AND CALCIUM CHLORIDE: 600; 310; 30; 20 INJECTION, SOLUTION INTRAVENOUS at 08:53

## 2019-03-17 RX ADMIN — INSULIN DETEMIR 10 UNITS: 100 INJECTION, SOLUTION SUBCUTANEOUS at 21:38

## 2019-03-17 RX ADMIN — ROCURONIUM BROMIDE 5 MG: 10 INJECTION INTRAVENOUS at 10:12

## 2019-03-17 RX ADMIN — OXYCODONE HYDROCHLORIDE 5 MG: 5 TABLET ORAL at 13:48

## 2019-03-17 RX ADMIN — PIPERACILLIN SODIUM,TAZOBACTAM SODIUM 3.38 G: 3; .375 INJECTION, POWDER, FOR SOLUTION INTRAVENOUS at 18:13

## 2019-03-17 RX ADMIN — PROPOFOL 200 MG: 10 INJECTION, EMULSION INTRAVENOUS at 09:42

## 2019-03-17 RX ADMIN — LEVOTHYROXINE SODIUM 125 MCG: 100 TABLET ORAL at 13:31

## 2019-03-17 RX ADMIN — GLYCOPYRROLATE 0.3 MG: 0.2 INJECTION, SOLUTION INTRAMUSCULAR; INTRAVENOUS at 10:46

## 2019-03-17 RX ADMIN — GLYCOPYRROLATE 0.6 MG: 0.2 INJECTION, SOLUTION INTRAMUSCULAR; INTRAVENOUS at 10:43

## 2019-03-17 RX ADMIN — ONDANSETRON 4 MG: 2 INJECTION INTRAMUSCULAR; INTRAVENOUS at 10:13

## 2019-03-17 RX ADMIN — DEXTROSE MONOHYDRATE 25 ML: 500 INJECTION PARENTERAL at 06:03

## 2019-03-17 RX ADMIN — Medication 5 MG: at 09:59

## 2019-03-17 RX ADMIN — FENTANYL CITRATE 50 MCG: 50 INJECTION, SOLUTION INTRAMUSCULAR; INTRAVENOUS at 10:30

## 2019-03-17 RX ADMIN — NEOSTIGMINE METHYLSULFATE 3 MG: 1 INJECTION, SOLUTION INTRAVENOUS at 10:43

## 2019-03-17 RX ADMIN — PIPERACILLIN SODIUM,TAZOBACTAM SODIUM 3.38 G: 3; .375 INJECTION, POWDER, FOR SOLUTION INTRAVENOUS at 05:04

## 2019-03-17 RX ADMIN — PIPERACILLIN SODIUM,TAZOBACTAM SODIUM 3.38 G: 3; .375 INJECTION, POWDER, FOR SOLUTION INTRAVENOUS at 10:05

## 2019-03-17 RX ADMIN — Medication 5 MG: at 09:46

## 2019-03-17 RX ADMIN — PIPERACILLIN SODIUM,TAZOBACTAM SODIUM 3.38 G: 3; .375 INJECTION, POWDER, FOR SOLUTION INTRAVENOUS at 22:51

## 2019-03-17 RX ADMIN — DEXTROSE MONOHYDRATE 25 ML: 25 INJECTION, SOLUTION INTRAVENOUS at 10:07

## 2019-03-17 RX ADMIN — NEOSTIGMINE METHYLSULFATE 1.5 MG: 1 INJECTION, SOLUTION INTRAVENOUS at 10:46

## 2019-03-17 ASSESSMENT — ACTIVITIES OF DAILY LIVING (ADL)
ADLS_ACUITY_SCORE: 22
ADLS_ACUITY_SCORE: 24
ADLS_ACUITY_SCORE: 22

## 2019-03-17 NOTE — ANESTHESIA CARE TRANSFER NOTE
Patient: Pascual Perea    Procedure(s):  LAPAROSCOPIC CHOLECYSTECTOMY    Diagnosis: Unknown  Diagnosis Additional Information: No value filed.    Anesthesia Type:   General, ETT     Note:  Airway :Face Mask  Patient transferred to:PACU  Comments: Neuromuscular blockade reversed after TOF 4/4, spontaneous respirations, adequate tidal volumes, followed commands to voice, oropharynx suctioned with soft flexible catheter, extubated atraumatically, airway patent after extubation.  Oxygen via facemask at 10 liters per minute to PACU. Oxygen tubing connected to wall O2 in PACU, SpO2, NiBP, and EKG monitors and alarms on and functioning, Amanda Hugger warmer connected to patient gown, report on patient's clinical status given to PACU RN, RN questions answered. Handoff Report: Identifed the Patient, Identified the Reponsible Provider, Reviewed the pertinent medical history, Discussed the surgical course, Reviewed Intra-OP anesthesia mangement and issues during anesthesia, Set expectations for post-procedure period and Allowed opportunity for questions and acknowledgement of understanding      Vitals: (Last set prior to Anesthesia Care Transfer)    CRNA VITALS  3/17/2019 1030 - 3/17/2019 1105      3/17/2019             Pulse:  67    Ht Rate:  69    SpO2:  99 %    Resp Rate (observed):  8    Resp Rate (set):  10                Electronically Signed By: JAKI Gomez CRNA  March 17, 2019  11:05 AM

## 2019-03-17 NOTE — PLAN OF CARE
ORIENTATION/BEHAVIOR: A/Ox4. Pleasant.  ABNL VS/O2: VSS on RA. Capno on. Tele a-fib CVR. Bradycardic at times, 50s.  MOBILITY/FALL RISK: Up SBA. In chair for dinner. Steady. Ambulated unit x1.  PAIN MANAGMENT: Oxycodone and cold pack for abdominal discomfort. Per MD no tylenol due to elevated liver enzymes.   DIET: Regular diet. Denies nausea.  BOWEL/BLADDER: Urinal, hesitancy, fluids encouraged. Ambulation encouraged. Bladder scanned. For >500cc. Straight cathed for 640cc.   ABNL LAB/BG: Blood sugars, 219/291. Sliding scale. Lantus at HS.  DRAIN/DEVICES: PIV IVF NS 100ml/hr.  SKIN: 4 sites to abdomen CD&I. Steri-strips and bandaids. Very slight jaundice to skin and scleras.  TESTS/PROCEDURES: Ambrosio rascon completed this morning.  AGGRESSION TOOL COLOR: Green   D/C DAY/GOALS/PLACE: 1-2 days pending improvement.  Other: per surgery, check labs in AM, eliquis can be restarted if Hbg stable tomorrow.

## 2019-03-17 NOTE — ANESTHESIA POSTPROCEDURE EVALUATION
Patient: Pascual Perea    Procedure(s):  LAPAROSCOPIC CHOLECYSTECTOMY    Diagnosis:Unknown  Diagnosis Additional Information: No value filed.    Anesthesia Type:  General, ETT    Note:  Anesthesia Post Evaluation    Patient location during evaluation: PACU  Patient participation: Able to fully participate in evaluation  Level of consciousness: awake and alert  Pain management: adequate  Airway patency: patent  Cardiovascular status: acceptable  Respiratory status: acceptable  Hydration status: acceptable  PONV: none     Anesthetic complications: None          Last vitals:  Vitals:    03/17/19 1210 03/17/19 1239 03/17/19 1330   BP: 122/67 123/71 136/77   Pulse: 56     Resp: 16 20    Temp: 36.6  C (97.9  F) 36.4  C (97.6  F)    SpO2: 98% 93% 95%         Electronically Signed By: Oscar Mejia MD  March 17, 2019  2:15 PM

## 2019-03-17 NOTE — PLAN OF CARE
AOx4. VSS, bradycardic overnight. NPO at midnight. Independent. Lap tarah 3/17. Tele: afib + CVR. L PIV NS @ 100ml + abx. Discharge pending.     AM: BG 39 in AM, glucose given 25ml, recheck 80, 25ml given.

## 2019-03-17 NOTE — OP NOTE
General Surgery Operative Note    PREOPERATIVE DIAGNOSIS:  cholecystitis.    POSTOPERATIVE DIAGNOSIS:  cholecystitis.    PROCEDURE:  Laparoscopic Cholecystectomy    SURGEON:  Janel Paz MD    ASSISTANT:  Cherrie Joe PA-C  The physician s assistant was medically necessary for their expertise in camera management, suctioning and retraction.  ANESTHESIA:  General.    BLOOD LOSS: 25ml    FINDINGS: distended gallbladder with significant wall edema    INDICATIONS:   Mr. Perea is a 78 yom who w PMH s/f afib on eliquis, DM, CAD and HTN who presented with abdominal pain and was found to have elevated LFTs. He had an ultrasound concerning for cholecystitis as well as CBD dilation. He underwent EUS which did not reveal choledocholithiasis. His LFTs have been improving. His eliquis has been held 48hrs. He is now presenting for laparoscopic cholecystectomy.  I explained the risks, benefits, complications for laparoscopic cholecystectomy including but not limited to bleeding, infection, possible need to open, possible postop hematoma, seroma, bowel, bladder or bile duct injury, MI, PE, and if any of these occurred the patient would require additional procedures. The patient agreed and did sign consent.    DETAILS OF PROCEDURE:   The patient was brought to the operating room. They was positioned on the operating room table with the left arm tucked at her side. General anesthesia was induced. Perioperative antibiotics were administered. The abdomen was prepped and draped in standard fashion.    A small skin incision was made in the left upper quadrant. A 5mm 0degree laparoscope was used with a visiport to obtain access to the abdomen. Insufflation was connected and flowed freely. Insufflation pressure was sent to 15mmhg. The abdomen was surveyed and ascites was noted in the abdomen. There was no injury from abdominal entrance noted. A 12mm port was placed in the midline just above the umbilicus. A 5mm 30 degree laparoscope  was inserted and revealed no trocar injuries. Local was injected to the upper abdomen and two 5mm ports were placed in the right upper quadrant under direct visualization. The gallbladder was retracted superiorly and laterally. The gallbladder was distended and the wall very edematous. Cautery was used to take down the medial and lateral peritoneal attachments. I was able to delineate the artery and duct and got under the undersurface of the gallbladder to help further declinate the duct and artery. This dissection was taken up high along the gallbladder to confirm the critical view on multiple views. Two clips were placed proximally on the cystic duct and one distally. Two clips were placed proximally on the artery and one clip distally. Both the cystic artery and cystic duct were then completely transected with laparoscopic scissors.     The gallbladder was taken off the liver bed with cautery. The gallbladder was partially intrahepatic making this dissection difficult. There was bile spillage which was all aspirated. The liver bed was cauterized in several areas for hemostasis. The gallbladder was completely removed from the liver bed.  The gallbladder was then placed in an Endocatch bag and removed through the umbilical trocar site. The camera was reinserted which showed no bleeding or bile spillage. Copious irrigation was used until clear. The wound bed was inspected multiple times showing that it was completely dry and that the clips were in place. The omentum was packed into the perihepatic fossa. The 12mm port fascia closed with several 0 Vicryl sutures using the kobe sapna device. All gas was expelled and the trocars were taken out under direct visualization. Marcaine 0.5% were injected to all the wounds. The skin was closed with a deep 3-0 vicryl and 4-0 Monocryl in a subcuticular fashion. Steri-strips and sterile dressings were applied. The patient tolerated the procedure well. There were no  complications. They were awoken from anesthesia and transferred to PACU in stable condition. All sponge, instrument and needle counts were correct.       CAMILA ANDREWS MD    Please route or send letter to:  Primary Care Provider (PCP) and Referring Provider

## 2019-03-17 NOTE — PLAN OF CARE
Care Plan Summary Note:  ORIENTATION/BEHAVIOR: A/Ox4. Pleasant.  ABNL VS/O2: VSS on RA. Capno on.  MOBILITY/FALL RISK: Up SBA. In chair for lunch. Steady.  PAIN MANAGMENT: Oxycodone x1 for 2/10 discomfort in abdomen. Per MD no tylenol due to elevated liver enzymes.   DIET: Advanced as tolerated, tolerated full liquid lunch. Will advance to mod carb regular diet for dinner.   BOWEL/BLADDER: Continent. UA to be collected.  ABNL LAB/BG: Hypoglycemic in AM , BS 53, corrected with IV dextrose. Now 111.  DRAIN/DEVICES: PIV IVF NS 100ml/hr. Tele SD.  SKIN: 4 sites to abdomen CD&I. Steri-strips and bandaids. Very slight jaundice to skin and scleras.  TESTS/PROCEDURES: Ambrosio rascon completed this morning.  AGGRESSION TOOL COLOR: Green   D/C DAY/GOALS/PLACE: 1-2 days pending improvement.

## 2019-03-17 NOTE — PROGRESS NOTES
[facility]  Hospitalist Progress Note   03/17/2019          Assessment and Plan:       Pascual Perea is a 78 year old male  with medical history significant for diabetes, coronary artery disease, atrial flutter, hypothyroidism and hypertensionadmitted on 3/13/2019 with nausea vomiting and abdominal pain.     Acute cholecystitis with sludge/obstructed jaundice status post laparoscopic cholecystectomy 3/17  Acute liver injury /lactic acidosis from above.  Presented with nausea vomiting abdominal pain, leukocytosis and jaundice.   , , bilirubin 8.5.  US showed contracted gallbladder and sludge ball in gallbladder.    CT abdomen and pelvis unremarkable, no biliary dilatation.    EUS showed gallbladder sludge without choledocholithiasis  Blood cultures from admission grew E. Coli.  Repeat blood cultures no growth.    Gastroenterology recommended supportive care, general surgery eval.  Underwent laparoscopic cholecystectomy on 3/17 by general surgery.  His Eliquis has been on hold for 48 hours for procedure. Appreciate GI/general surgery comanagement.  Defer postprocedure pain management/fluid resuscitation/anticoagulation to general surgery.    Continue IV Zosyn (3/13) and will discontinue a.m will complete 5 days of IV antibiotics.  Monitor liver function test.  Avoid hepatotoxic drugs.  PRN IV Dilaudid for severe pain, oral tramadol for mild to moderate pain.  PRN IV/p.o. Zofran.  GI prophylaxis with Ranitidine.    E. coli bacteremia likely from abdominal source--cleared  Blood cultures from 3/13 grew E. Coli.  Urine culture 10,000 to 50,000 colonies/mL  Escherichia coli   Blood cultures repeat from 3/14 no growth to date.  Continue IV Zosyn as above.  Follow final blood culture results.    Thrombocytopenia likely dilutional/in the setting of infection  Platelets dropped from 164 to 119K, no bleeding noted.    Has been receiving IV fluids/IV antibiotics.  Continue to monitor platelet  count.     Acute kidney injury likely prerenal:   Patient has normal baseline creatinine 0.8-0.9  Presented with creatinine of 2.36 with supportive care trending down to 1.49  Continue to hold PTA lisinopril.  Continue IV fluids as above.  Monitor renal function closely.  Avoid nephrotoxic drugs.    Mild hyperkalemia in the setting of acute kidney injury.    Now resolved continue to hold lisinopril     Atrial flutter:  Continue PTA amiodarone 20 mg oral daily.  Eliquis has been on hold for 48 hours for procedure today.  Will defer to general surgery regarding restarting Eliquis after procedure.  Continue telemetry monitoring (A flutter )  PRN IV metoprolol ordered.    Hypertension.  Hold PTA lisinopril given acute kidney injury.  PRN IV hydralazine ordered.  Systolic blood pressures fluctuating between 100s-130s.    Hypoglycemia while n.p.o.  Type 2 diabetes:  Hemoglobin A1c of 7.4  Had blood sugar of 39 this morning, improved with dextrose infusion.  Postprocedure blood sugar in 100s, poor oral intake.  Continue high intensity sliding scale insulin.  Decrease Levemir to 10 units at bedtime [PTA 35 to 40 units]  Monitor blood sugars and optimize insulin regimen.     Hypothyroidism:   Continue PTA levothyroxine.    Dyslipidemia:    Hold PTA pravastatin     Orders Placed This Encounter      Advance Diet as Tolerated: Regular Diet Adult; 8010-2732 Calories: Moderate Consistent CHO (4-6 CHO units/meal)      DVT Prophylaxis: Sequential compression device, ambulate  Code Status: Full Code  Disposition: Expected discharge in 1- 2 days pending clinical improvement.    Patient, his wife by bedside interdisciplinary team involved in care and agrees with plan.  Total time >25 min (RN). More than 50% of time spent in direct patient care, care coordination, patient counseling, and formalizing plan of care.     Anthony Cruz MD        Interval History:      Patient appears comfortable, sitting up in bed.  Denies any chest  pain or shortness of breath.  No nausea, no vomiting.  Underwent laparoscopic cholecystectomy this morning, procedure uneventful.  Noted EBL of 25 mL.  Has had hypoglycemia earlier this morning with blood sugars of 39 improved with dextrose infusion.         Physical Exam:        Physical Exam   Temp:  [97.5  F (36.4  C)-98  F (36.7  C)] 97.5  F (36.4  C)  Pulse:  [56-59] 56  Heart Rate:  [52-58] 56  Resp:  [18-20] 20  BP: (134-152)/(79-89) 134/79  SpO2:  [95 %-99 %] 99 %    Intake/Output Summary (Last 24 hours) at 3/15/2019 1252  Last data filed at 3/15/2019 1200  Gross per 24 hour   Intake 4504 ml   Output 2000 ml   Net 2504 ml     Admission Weight: 88.9 kg (196 lb)  Current Weight: 91.7 kg (202 lb 3.2 oz)    PHYSICAL EXAM  GENERAL: Patient is in no distress. Alert and oriented.  HEART: irregular rate and rhythm. S1S2. No murmurs  LUNGS: Bilateral slightly decreased breath sounds.  No wheezing no crackles.  ABDOMEN: Soft, surgical site tenderness, dressing intact.  Bowel sounds heard   NEURO: Moving all extremities, no focal weakness.  EXTREMITIES: No pedal edema. 2+ peripheral pulses.  SKIN: Warm, dry. No rash or bruising.  PSYCHIATRY Cooperative       Medications:          amiodarone  200 mg Oral QAM     fexofenadine  180 mg Oral Daily     fluticasone  1 spray Both Nostrils Daily     insulin aspart  1-10 Units Subcutaneous TID AC     insulin aspart  1-7 Units Subcutaneous At Bedtime     levothyroxine  125 mcg Oral Daily     piperacillin-tazobactam  3.375 g Intravenous Q6H     ranitidine  150 mg Oral Daily     sodium chloride (PF)  3 mL Intracatheter Q8H     glucose **OR** dextrose **OR** glucagon, HYDROmorphone, melatonin, naloxone, ondansetron **OR** ondansetron, oxyCODONE, prochlorperazine **OR** prochlorperazine **OR** prochlorperazine         Data:      All new lab and imaging data was reviewed.

## 2019-03-18 VITALS
OXYGEN SATURATION: 95 % | HEART RATE: 61 BPM | WEIGHT: 202.2 LBS | SYSTOLIC BLOOD PRESSURE: 155 MMHG | DIASTOLIC BLOOD PRESSURE: 83 MMHG | BODY MASS INDEX: 28.31 KG/M2 | TEMPERATURE: 98.1 F | RESPIRATION RATE: 17 BRPM | HEIGHT: 71 IN

## 2019-03-18 LAB
ALBUMIN SERPL-MCNC: 2.9 G/DL (ref 3.4–5)
ALP SERPL-CCNC: 333 U/L (ref 40–150)
ALT SERPL W P-5'-P-CCNC: 162 U/L (ref 0–70)
ANION GAP SERPL CALCULATED.3IONS-SCNC: 6 MMOL/L (ref 3–14)
AST SERPL W P-5'-P-CCNC: 62 U/L (ref 0–45)
BILIRUB SERPL-MCNC: 2.2 MG/DL (ref 0.2–1.3)
BUN SERPL-MCNC: 14 MG/DL (ref 7–30)
CALCIUM SERPL-MCNC: 9.2 MG/DL (ref 8.5–10.1)
CHLORIDE SERPL-SCNC: 105 MMOL/L (ref 94–109)
CO2 SERPL-SCNC: 26 MMOL/L (ref 20–32)
CREAT SERPL-MCNC: 1.38 MG/DL (ref 0.66–1.25)
ERYTHROCYTE [DISTWIDTH] IN BLOOD BY AUTOMATED COUNT: 16.1 % (ref 10–15)
GFR SERPL CREATININE-BSD FRML MDRD: 49 ML/MIN/{1.73_M2}
GLUCOSE BLDC GLUCOMTR-MCNC: 133 MG/DL (ref 70–99)
GLUCOSE BLDC GLUCOMTR-MCNC: 194 MG/DL (ref 70–99)
GLUCOSE BLDC GLUCOMTR-MCNC: 202 MG/DL (ref 70–99)
GLUCOSE SERPL-MCNC: 141 MG/DL (ref 70–99)
HCT VFR BLD AUTO: 46.8 % (ref 40–53)
HGB BLD-MCNC: 15.3 G/DL (ref 13.3–17.7)
MCH RBC QN AUTO: 28.3 PG (ref 26.5–33)
MCHC RBC AUTO-ENTMCNC: 32.7 G/DL (ref 31.5–36.5)
MCV RBC AUTO: 87 FL (ref 78–100)
PLATELET # BLD AUTO: 172 10E9/L (ref 150–450)
POTASSIUM SERPL-SCNC: 4.1 MMOL/L (ref 3.4–5.3)
PROT SERPL-MCNC: 6.6 G/DL (ref 6.8–8.8)
RBC # BLD AUTO: 5.41 10E12/L (ref 4.4–5.9)
SODIUM SERPL-SCNC: 137 MMOL/L (ref 133–144)
WBC # BLD AUTO: 7.1 10E9/L (ref 4–11)

## 2019-03-18 PROCEDURE — 80053 COMPREHEN METABOLIC PANEL: CPT | Performed by: HOSPITALIST

## 2019-03-18 PROCEDURE — 25000132 ZZH RX MED GY IP 250 OP 250 PS 637: Performed by: HOSPITALIST

## 2019-03-18 PROCEDURE — 25800030 ZZH RX IP 258 OP 636: Performed by: INTERNAL MEDICINE

## 2019-03-18 PROCEDURE — 99239 HOSP IP/OBS DSCHRG MGMT >30: CPT | Performed by: HOSPITALIST

## 2019-03-18 PROCEDURE — 25000128 H RX IP 250 OP 636: Performed by: INTERNAL MEDICINE

## 2019-03-18 PROCEDURE — 36415 COLL VENOUS BLD VENIPUNCTURE: CPT | Performed by: HOSPITALIST

## 2019-03-18 PROCEDURE — 85027 COMPLETE CBC AUTOMATED: CPT | Performed by: HOSPITALIST

## 2019-03-18 PROCEDURE — 00000146 ZZHCL STATISTIC GLUCOSE BY METER IP

## 2019-03-18 PROCEDURE — 25000132 ZZH RX MED GY IP 250 OP 250 PS 637: Performed by: INTERNAL MEDICINE

## 2019-03-18 RX ORDER — LANOLIN ALCOHOL/MO/W.PET/CERES
5000 CREAM (GRAM) TOPICAL DAILY
COMMUNITY
Start: 2019-03-18 | End: 2019-04-25

## 2019-03-18 RX ORDER — LISINOPRIL 30 MG/1
30 TABLET ORAL DAILY
Qty: 90 TABLET | Refills: 3 | Status: SHIPPED | OUTPATIENT
Start: 2019-03-18 | End: 2019-04-19

## 2019-03-18 RX ORDER — PRAVASTATIN SODIUM 80 MG/1
TABLET ORAL
Qty: 90 TABLET | Refills: 3 | COMMUNITY
Start: 2019-03-18 | End: 2019-04-19

## 2019-03-18 RX ORDER — OXYCODONE HYDROCHLORIDE 5 MG/1
5 TABLET ORAL EVERY 4 HOURS PRN
Qty: 12 TABLET | Refills: 0 | Status: SHIPPED | OUTPATIENT
Start: 2019-03-18 | End: 2019-04-10

## 2019-03-18 RX ORDER — GEMFIBROZIL 600 MG/1
600 TABLET, FILM COATED ORAL 2 TIMES DAILY
Qty: 180 TABLET | Refills: 3 | Status: SHIPPED | OUTPATIENT
Start: 2019-03-18 | End: 2019-04-25

## 2019-03-18 RX ADMIN — RANITIDINE 150 MG: 150 TABLET ORAL at 08:11

## 2019-03-18 RX ADMIN — AMIODARONE HYDROCHLORIDE 200 MG: 200 TABLET ORAL at 08:11

## 2019-03-18 RX ADMIN — SODIUM CHLORIDE: 9 INJECTION, SOLUTION INTRAVENOUS at 03:02

## 2019-03-18 RX ADMIN — LEVOTHYROXINE SODIUM 125 MCG: 100 TABLET ORAL at 08:11

## 2019-03-18 RX ADMIN — OXYCODONE HYDROCHLORIDE 5 MG: 5 TABLET ORAL at 03:00

## 2019-03-18 RX ADMIN — FEXOFENADINE HYDROCHLORIDE 180 MG: 180 TABLET, FILM COATED ORAL at 08:11

## 2019-03-18 RX ADMIN — FLUTICASONE PROPIONATE 1 SPRAY: 50 SPRAY, METERED NASAL at 08:12

## 2019-03-18 RX ADMIN — APIXABAN 5 MG: 5 TABLET, FILM COATED ORAL at 14:10

## 2019-03-18 RX ADMIN — PIPERACILLIN SODIUM,TAZOBACTAM SODIUM 3.38 G: 3; .375 INJECTION, POWDER, FOR SOLUTION INTRAVENOUS at 04:54

## 2019-03-18 RX ADMIN — AMOXICILLIN AND CLAVULANATE POTASSIUM 1 TABLET: 875; 125 TABLET, FILM COATED ORAL at 10:10

## 2019-03-18 ASSESSMENT — ACTIVITIES OF DAILY LIVING (ADL)
ADLS_ACUITY_SCORE: 22

## 2019-03-18 NOTE — DISCHARGE INSTRUCTIONS
[facility]  Discharge Instructions: Post-Operative Laparoscopic Cholecystectomy    ACTIVITY    Expect to feel tired after your surgery.  This will gradually resolve.      Take frequent, short walks and increase your activity gradually.      Avoid strenuous physical activity or heavy lifting greater than 15-20 lbs. for 2-3 weeks.  You may climb stairs.    You may drive without restrictions when you are not using any prescription pain medication and feel comfortable in a car.    You may return to work/school when you are comfortable without any prescription pain medication.    WOUND CARE    You may remove your outer dressing or Band-Aids and shower 48 hours after the surgery.  Pat your incisions dry and leave them open to air.  Re-apply dressing (Band-Aids or gauze/tape) as needed for comfort or drainage.    You may have steri-strips (looks like white tape) on your incision.  You may peel off the steri-strips 2 weeks after your surgery if they have not peeled off on their own.     Do not soak your incisions in a tub or pool for 2 weeks.     Do not apply any lotions, creams, or ointments to your incisions.    A ridge under your incisions is normal and will gradually resolve.    DIET    Start with liquids, then gradually resume your regular diet as tolerated.  Avoid heavy, spicy, and greasy meals for 2-3 days.    Drink plenty of fluids to stay hydrated.    It is not uncommon to experience some loose stools or diarrhea after surgery.  This is your body s way of adapting to the bile which will slowly drain into your intestine.  A low fat diet may help with this.  This should improve over 1-2 months.    PAIN    Expect some tenderness and discomfort at the incision sites.  Use the prescribed pain medication at your discretion.  Expect gradual resolution of your pain over several days.    You may take ibuprofen with food (unless you have been told not to) instead of or in addition to  your prescribed pain medication.  If you are taking Norco or Percocet, do not take any additional acetaminophen/APAP/Tylenol.    Do not drink alcohol or drive while you are taking pain medications.    You may apply ice to your incisions in 20 minute intervals as needed for the next 48 hours.  After that time, consider switching to heat if you prefer.    EXPECTATIONS    Pain medications can cause constipation.  Limit use when possible.  Take over the counter stool softener/stimulant, such as Colace or Senna, 1-2 times a day with plenty of water.  You may take a mild over the counter laxative, such as Miralax or a suppository, as needed.  You may discontinue these medications once you are having regular bowel movements and/or are no longer taking your narcotic pain medication.      You may have shoulder or upper back discomfort due to the gas used in surgery.  This is temporary and should resolve in 48-72 hours.  Short, frequent walks may help with this.    FOLLOW UP    Our office will contact you in approximately 2 weeks to check on your progress and answer any questions you may have.  If you are doing well, you will not need to return for a follow up appointment.  If any concerns are identified over the phone, we will help you make an appointment to see a provider.     If you have not received a phone call, have any questions or concerns, or would like to be seen, please call us at [phone] and ask to speak with our nurse.  We are located at [address] [place] [address].    CALL OUR OFFICE [phone] IF YOU HAVE:     Chills or fever above 101 F.    Increased redness, warmth, or drainage at your incisions.    Significant bleeding.    Pain not relieved by your pain medication or rest.    Increasing pain after the first 48 hours.    Any other concerns or questions.    Revised January 2018

## 2019-03-18 NOTE — PLAN OF CARE
A&Ox4. VSS on RA. Up independently in room. Denies pain. Discharge instructions and medications reviewed at the bedside with the patient along with his wife and son; understanding verbalized.

## 2019-03-18 NOTE — PROGRESS NOTES
"General Surgery Progress Note    Subjective: Dayday is doing fine after surgery. Some urinary retention with bladder scan for 450ml and voiding 175. Minimal abdominal pain. LFTs trending down slowly.     Objective: /88 (BP Location: Right arm)   Pulse 74   Temp 98.1  F (36.7  C) (Oral)   Resp 18   Ht 1.803 m (5' 11\")   Wt 91.7 kg (202 lb 3.2 oz)   SpO2 92%   BMI 28.20 kg/m    Gen: alert, no distress  CV: RRR  Pulm: nonlabored breathing  Abd: soft, tender near incisions, incisions c/d/i  Ext: WWP    Assessment/Plan:   Pascual Perea  is a 78 year old male POD 1 s/p laparoscopic cholecystectomy for cholecystitis. He is doing well post operatively. Hgb is stable.   - ok for discharge from surgical perspective  - he should follow up with PCP in 1-2 weeks to re-evaluate LFTs as they are still elevated  - monitor urinary retention and ensure resolved prior to discharge   - surgical discharge orders/instructions placed.     Janel Paz MD  [facility]  [phone]              "

## 2019-03-18 NOTE — PLAN OF CARE
Alert and oriented. Up with SBA. VSS on room air. Capno on, lap tarah sites CDI with scant drainage. Some periods of apnea/snoring noted while sleeping. Oxycodone give for 5/10 abdominal pain. Bladder scanned for 450 then voided 175, continue to monitor. IV at 100/hr. . Tele monitored.

## 2019-03-18 NOTE — PLAN OF CARE
Care Plan Summary Note:  ORIENTATION/BEHAVIOR: A+Ox4  ABNL VS/O2: Capno discontinued. Stable on RA. Tele: Sinus clement with occasional PVC's. Ambulated in halls per MD request, maintained HR 75/85.  MOBILITY/FALL RISK: Up independently.  PAIN MANAGMENT: PRN Oxy available, denies pain this shift.  DIET: Tolerating mod-cho Diet  BOWEL/BLADDER: Was experiencing some urinary hesitancy this AM. Resolved, voiding appropriately.  ABNL LAB/BG: /202  SKIN: 4 lap sites (steri-strips + band-aid) dry, intact, dried drainage on left upper and lower sites.   AGGRESSION TOOL COLOR: Green  D/C DAY/GOALS/PLACE: Anticipate discharge to home today 3/18 this afternoon 1600/1700.  OTHER: Nursing will continue to monitor.

## 2019-03-18 NOTE — PROGRESS NOTES
Care Transition Initial Assessment - RN     Pt is discharging home today.  Met with pt and his spouse briefly.  Pt feels his pain is controlled and he is eating.  Pt's spouse is a RN that now has dementia.  A follow-up PCP appointment has been scheduled for 3/22/19.  No other discharge needs have been identified in discussion with them.

## 2019-03-18 NOTE — DISCHARGE SUMMARY
Discharge Summary  Hospitalist    Date of Admission:  3/13/2019  Date of Discharge:  3/18/2019  Discharging Provider: Anthony Cruz MD    Primary Care Physician   Andrew Elizalde  Primary Care Provider Phone Number: [phone]  Primary Care Provider Fax Number: [phone]    PRINCIPAL DIAGNOSIS  Acute cholecystitis with sludge/obstructed jaundice status post laparoscopic cholecystectomy 3/17  Acute liver injury /lactic acidosis from above.  E. coli bacteremia likely from abdominal source--cleared  Thrombocytopenia likely dilutional/in the setting of infection  Acute kidney injury likely prerenal:   Mild hyperkalemia in the setting of acute kidney injury.      Past Medical History:   Diagnosis Date     Adhesive capsulitis of shoulder      Anemia 3/10/2014     Anemia, unspecified      Antiplatelet or antithrombotic long-term use      Arrhythmia     Atrial fib/flutter     CAD (coronary artery disease)      History of cardioversion 04/24/2018    a single synchronized shock of 120 joules restored normal sinus rhythm     History of cardioversion 02/13/2019    single shock , 200 joules successul in restoring NSR     Hyperlipidemia LDL goal <70 5/26/2011     Hypertension      Hypothyroidism      Impotence of organic origin      Laceration of finger  June 2010     left thumb s/p sutures     Numbness and tingling     diabetic neuropathy bilateral feet     BRANDON (obstructive sleep apnea)     intolerant of CPAP, uses it occasionally.  Using mandibular device occasionally     Osteopenia      Pulmonary hypertension (H) 4/6/2012     Sensorineural hearing loss, unspecified      Stented coronary artery 1997    CABG      Testosterone deficiency      Type 2 diabetes mellitus without complication  (goal A1C<8) 10/24/2015     Typical atrial flutter (H) 4/18/16     Unspecified hypothyroidism      Vitamin D deficiency 9/3/2011       History of Present Illness   Pascual Perea is an 78 year old male who presented with abdominal  pain    Hospital Course   Pascual Perea is a 78 year old male  with medical history significant for diabetes, coronary artery disease, atrial flutter, hypothyroidism and hypertensionadmitted on 3/13/2019 with nausea vomiting and abdominal pain.     Acute cholecystitis with sludge/obstructed jaundice status post laparoscopic cholecystectomy 3/17  Acute liver injury /lactic acidosis from above.  Presented with nausea vomiting abdominal pain, leukocytosis and jaundice.   , , bilirubin 8.5.  US showed contracted gallbladder and sludge ball in gallbladder.    CT abdomen and pelvis unremarkable, no biliary dilatation.    EUS showed gallbladder sludge without choledocholithiasis  Blood cultures from admission grew E. Coli.  Repeat blood cultures no growth.     Gastroenterology recommended supportive care, general surgery eval.  Underwent laparoscopic cholecystectomy on 3/17 by general surgery.  His Eliquis has been on hold for 48 hours for procedure and restarted today after hemoglobin has been stable.  General surgery recommend okay for discharge.  Was on IV Zosyn (3/13 - 3/17) and started Augmentin 3/18 for 5 days.    Monitor liver function test on PCP visit, continue to hold metformin/pravastatin/gemfibrozil given elevated liver function test.  Avoid hepatotoxic drugs.     E. coli bacteremia likely from abdominal source--cleared  Blood cultures from 3/13 grew E. Coli.  Urine culture 10,000 to 50,000 colonies/mL  Escherichia coli   Blood cultures repeat from 3/14 no growth to date.  Was on IV Zosyn (3/13 - 3/17) and started Augmentin 3/18 for 5 days to complete total 10 days of antibiotics  Follow final blood culture results.     Thrombocytopenia likely dilutional/in the setting of infection  Platelets dropped from 164 to 119K, no bleeding noted.    Has been receiving IV fluids/IV antibiotics.  Monitor CBC on PCP visit.     Acute kidney injury likely prerenal:   Patient has normal baseline creatinine  0.8-0.9  Presented with creatinine of 2.36 with supportive care trending down to 1.38  Continue to hold PTA lisinopril.  Adequate oral hydration, monitor renal function on PCP visit and consider restarting lisinopril.  Avoid nephrotoxic drugs.     Mild hyperkalemia in the setting of acute kidney injury.    Now resolved continue to hold lisinopril as above     Postoperative urinary retention.  Patient off narcotics today, with time voiding symptoms improving.  Good oral hydration, continue timed voiding.     Atrial flutter:  During hospital on telemetry had atrial flutter.  Intermittent bradycardia, on ambulation heart rate improved to 70s/80s.  Continue PTA amiodarone 20 mg oral daily.  Restarted PTA Eliquis on 3/18 after okay by general surgery.     Hypertension.  Hold PTA lisinopril given acute kidney injury/surgery.  Consider restarting on PCP visit.  Systolic blood pressures fluctuating between 100s-130s.     Hypoglycemia while n.p.o during hospitalization  Type 2 diabetes:  Hemoglobin A1c of 7.4  At time of discharge recommend to continue PTA Levemir 35 to 40 units with mealtime insulin per carb counting.  Hold PTA metformin until PCP visit.   Monitor blood sugars and optimize insulin regimen.     Hypothyroidism:   Continue PTA levothyroxine.     Dyslipidemia:    Hold PTA pravastatin    Patient, RN, care coordinator involved in care and agree with discharge plan    Anthony Cruz MD    Pending Results   These results will be followed up by ordering provider.  Unresulted Labs Ordered in the Past 30 Days of this Admission     Date and Time Order Name Status Description    3/17/2019 1031 Surgical pathology exam In process     3/14/2019 1401 Blood culture Preliminary     3/14/2019 1401 Blood culture Preliminary              Physical Exam   Vitals:    03/13/19 1938 03/15/19 0536   Weight: 88.9 kg (196 lb) 91.7 kg (202 lb 3.2 oz)     Vital Signs with Ranges  Temp:  [97.9  F (36.6  C)-98.1  F (36.7  C)] 98.1  F  (36.7  C)  Pulse:  [74] 74  Heart Rate:  [56-73] 57  Resp:  [18-20] 18  BP: (122-142)/(76-88) 142/88  SpO2:  [92 %-97 %] 92 %  I/O last 3 completed shifts:  In: 3787 [P.O.:1890; I.V.:1897]  Out: 1620 [Urine:1595; Blood:25]  PHYSICAL EXAM  GENERAL: Patient is in no distress. Alert and oriented.  HEART: irregular rate and rhythm. S1S2. No murmurs  LUNGS: Bilateral slightly decreased breath sounds.  No wheezing no crackles.  ABDOMEN: Soft, surgical site tenderness, dressing intact.  Bowel sounds heard   NEURO: Moving all extremities, no focal weakness.  EXTREMITIES: No pedal edema. 2+ peripheral pulses.  SKIN: Warm, dry. No rash or bruising.  PSYCHIATRY Cooperative  )Consultations This Hospital Stay   GASTROENTEROLOGY IP CONSULT  SURGERY GENERAL IP CONSULT    Time Spent on this Encounter   IAnthony, personally saw the patient today and spent greater than 30 minutes discharging this patient.    Discharge Orders      Reason for your hospital stay    You were admitted to the hospital with abdominal pain, underwent endoscopy ultrasound, laparoscopic cholecystectomy and improving symptoms.  Plan for discharge with close outpatient follow-up.     Follow-up and recommended labs and tests     Follow up with primary care provider, Andrew Elizalde, within 5 days for hospital follow- up.  The following labs/tests are recommended: CBC, CMP.  Follow-up with general surgery per schedule..     Activity    Your activity upon discharge: activity as tolerated and no driving for today     Monitor and record    Monitor daily blood sugars at least twice, review on provider visit for optimization of therapy.     When to contact your care team    Contact your medical provider if you have fever/worsening abdominal pain.     Discharge Instructions    Aggressive incentive spirometry.  Avoid hepatotoxic drugs, nephrotoxic drugs, over-the-counter medications  Adequate oral hydration, timed voiding.     Diet    Moderate Consistent CHO (4-6  CHO units/meal) low-salt, low-fat diet.  Advance diet as tolerated.       Discharge Medications   Current Discharge Medication List      START taking these medications    Details   amoxicillin-clavulanate (AUGMENTIN) 875-125 MG tablet Take 1 tablet by mouth every 12 hours  Qty: 9 tablet, Refills: 0    Associated Diagnoses: Escherichia coli (E. coli) infection      oxyCODONE (ROXICODONE) 5 MG tablet Take 1 tablet (5 mg) by mouth every 4 hours as needed for moderate to severe pain  Qty: 12 tablet, Refills: 0    Associated Diagnoses: Postoperative pain         CONTINUE these medications which have CHANGED    Details   cyanocobalamin (VITAMIN B-12) 1000 MCG tablet Take 5 tablets (5,000 mcg) by mouth daily    Comments: HOLD UNTIL PCP VISIT      ferrous fumarate 65 mg, [place]. FE,-Vitamin C 125 mg (VITRON-C)  MG TABS tablet 1 tab twice a day for anemia  Qty: 60 tablet, Refills: 11    Comments: HOLD UNTIL PCP VISIT  Associated Diagnoses: Iron deficiency anemia, unspecified iron deficiency anemia type      gemfibrozil (LOPID) 600 MG tablet Take 1 tablet (600 mg) by mouth 2 times daily  Qty: 180 tablet, Refills: 3    Comments: HOLD UNTIL PCP VISIT AND RECHECK LIVER / RENAL FUNCTION  Associated Diagnoses: Hyperlipidemia LDL goal <70      lisinopril (PRINIVIL/ZESTRIL) 30 MG tablet Take 1 tablet (30 mg) by mouth daily  Qty: 90 tablet, Refills: 3    Comments: HOLD UNTIL PCP VISIT AND RECHECK LIVER / RENAL FUNCTION  Associated Diagnoses: Essential hypertension, benign      metFORMIN (GLUCOPHAGE) 1000 MG tablet TAKE ONE TABLET BY MOUTH TWO TIMES A DAY WITH MEALS  Qty: 180 tablet, Refills: 3    Comments: HOLD UNTIL PCP VISIT AND RECHECK LIVER / RENAL FUNCTION  Associated Diagnoses: Type 2 diabetes mellitus without complication, with long-term current use of insulin (H)      pravastatin (PRAVACHOL) 80 MG tablet TAKE ONE TABLET BY MOUTH EVERY NIGHT AT BEDTIME  Qty: 90 tablet, Refills: 3    Comments: HOLD UNTIL PCP VISIT AND  RECHECK LIVER / RENAL FUNCTION  Associated Diagnoses: Hyperlipidemia LDL goal <70         CONTINUE these medications which have NOT CHANGED    Details   amiodarone (PACERONE/CODARONE) 200 MG tablet Take 1 tablet (200 mg) by mouth every morning And 2 tablets (400 mg) every evening for 2 weeks then 1 tablet every morning  Qty: 90 tablet, Refills: 3    Associated Diagnoses: Atrial fibrillation, unspecified type (H)      apixaban ANTICOAGULANT (ELIQUIS) 5 MG tablet Take 1 tablet (5 mg) by mouth 2 times daily  Qty: 60 tablet, Refills: 11    Associated Diagnoses: Typical atrial flutter (H)      Cholecalciferol (VITAMIN D3 PO) Take 5,000 Units by mouth daily      fexofenadine (ALLEGRA) 180 MG tablet Take 1 tablet (180 mg) by mouth daily  Qty: 90 tablet, Refills: 3    Comments: Disp generic  Associated Diagnoses: Allergic rhinitis, unspecified seasonality, unspecified trigger      fluticasone (FLONASE) 50 MCG/ACT spray USE ONE TO TWO SPRAYS IN EACH NOSTRIL EVERY DAY  Qty: 48 g, Refills: 3    Associated Diagnoses: Chronic allergic rhinitis, unspecified seasonality, unspecified trigger      insulin aspart (NOVOLOG FLEXPEN) 100 UNIT/ML injection Inject 3 times daily (before meals). 2 units per carb choice (approx 5-10 units/meal)  Qty: 30 mL, Refills: 0    Associated Diagnoses: Type 2 diabetes mellitus without complication, with long-term current use of insulin (H)      insulin detemir (LEVEMIR FLEXPEN/FLEXTOUCH) 100 UNIT/ML pen Inject 35-40 Units Subcutaneous At Bedtime  Qty: 45 mL, Refills: 3    Comments: Profile only. Pt doesn't require refill at this time  Associated Diagnoses: Type 2 diabetes mellitus without complication, with long-term current use of insulin (H)      levothyroxine (SYNTHROID/LEVOTHROID) 125 MCG tablet TAKE ONE TABLET BY MOUTH EVERY DAY  Qty: 90 tablet, Refills: 3    Associated Diagnoses: Hypothyroidism, unspecified type      nitroglycerin (NITROSTAT) 0.4 MG SL tablet Place 1 tablet (0.4 mg) under the  "tongue every 5 minutes as needed for chest pain  Qty: 25 tablet, Refills: 1    Associated Diagnoses: Unstable angina (H)      ranitidine (ZANTAC) 150 MG tablet TAKE ONE TABLET BY MOUTH TWICE A DAY  Qty: 180 tablet, Refills: 2    Associated Diagnoses: Gastroesophageal reflux disease without esophagitis      testosterone cypionate (DEPOTESTOSTERONE) 200 MG/ML injection Inject 1.5 mLs (300 mg) into the muscle every 14 days  Qty: 10 mL, Refills: 1    Associated Diagnoses: Testosterone deficiency      B-D LUER-FELICITAS SYRINGE 22G X 1-1/2\" 3 ML MISC USE WITH TESTOSTERONE EVERY 2 WEEKS AS DIRECTED  Qty: 30 each, Refills: 1    Associated Diagnoses: Testosterone deficiency      blood glucose monitoring (ONE TOUCH ULTRA) test strip Test twice daily with One Touch Ultra Blue  Qty: 200 each, Refills: 3    Comments: Profile only. Pt doesn't require refill at this time  Associated Diagnoses: Type 2 diabetes mellitus without complication, with long-term current use of insulin (H)      GLUCOSE 4 GM OR CHEW as directed  Qty: 30, Refills: 11    Associated Diagnoses: Type II or unspecified type diabetes mellitus without mention of complication, not stated as uncontrolled      INSUPEN ULTRAFIN 30G X 8 MM USE FOUR TIMES A DAY  Qty: 400 each, Refills: 0    Comments: Medication is being filled for 1 time refill only due to:  Patient needs office visit.  Associated Diagnoses: Type 2 diabetes mellitus without complication, with long-term current use of insulin (H)      ONETOUCH DELICA LANCETS 33G MISC 1 Device 3 times daily . Use to test blood sugars 3 times daily or as directed.  Qty: 300 each, Refills: prn    Associated Diagnoses: Type 2 diabetes, HbA1C goal < 8% (H)      order for DME Equipment being ordered: 3ml syringes and 22G 1 1/2\" needles to use with testosterone inj every 2 weeks  Qty: 30 Device, Refills: 1    Associated Diagnoses: Testosterone deficiency           Allergies   Allergies   Allergen Reactions     Omeprazole      " diarrhea       Discharge Disposition   Discharged to home  Condition at discharge: Good    DATA  Most Recent 3 CBC's:  Recent Labs   Lab Test 03/18/19  0941 03/17/19  0541 03/15/19  0553   WBC 7.1 6.1 5.8   HGB 15.3 13.1* 13.1*   MCV 87 85 85    119* 101*      Most Recent 3 BMP's:  Recent Labs   Lab Test 03/18/19  0941 03/17/19  0541 03/16/19  0938    141 137   POTASSIUM 4.1 3.7 4.2   CHLORIDE 105 111* 106   CO2 26 25 22   BUN 14 15 19   CR 1.38* 1.49* 1.62*   ANIONGAP 6 5 9   STARR 9.2 8.8 9.1   * 50* 200*     Most Recent 2 LFT's:  Recent Labs   Lab Test 03/18/19  0941 03/17/19  0541   AST 62* 86*   * 171*   ALKPHOS 333* 320*   BILITOTAL 2.2* 2.6*     Most Recent INR's and Anticoagulation Dosing History:  Anticoagulation Dose History     Recent Dosing and Labs Latest Ref Rng & Units 5/12/2005 3/30/2012 3/14/2019 3/17/2019    INR 0.86 - 1.14 0.90 0.94 1.37(H) 1.09          Most Recent 6 Bacteria Isolates From Any Culture (See EPIC Reports for Culture Details):  Recent Labs   Lab Test 03/14/19  1805 03/14/19  1756 03/13/19  2210 03/13/19  2150 03/13/19  2035 08/18/16  0800   CULT No growth after 4 days No growth after 4 days 10,000 to 50,000 colonies/mL  Escherichia coli  * Cultured on the 1st day of incubation:  Escherichia coli  Susceptibility testing done on previous specimen  *  Critical Value/Significant Value, preliminary result only, called to and read back by  Yoko Devine RN at [place] [address] on 3.14.19 at 1106 by SS.   Cultured on the 1st day of incubation:  Escherichia coli  *  Critical Value/Significant Value, preliminary result only, called to and read back by  Yoko Devine RN at [place] [address] on 3.14.19 at 1025 by SS.    (Note)  POSITIVE for E.COLI by [employer] multiplex nucleic acid test. Final  identification and antimicrobial susceptibility testing will be  verified by standard methods. Verigene test will not distinguish  E.coli from Shigella species including  S.dysenteriae, S.flexneri,  S.boydii, and S.sonnei. Specimens containing Shigella species or  E.coli will be reported as Positive for E.coli.    Specimen tested with [employer] multiplex, gram-negative blood culture  nucleic acid test for the following targets: Acinetobacter sp.,  Citrobacter sp., Enterobacter sp., Proteus sp., E. coli, K.  pneumoniae/oxytoca, P. aeruginosa, and the following resistance  markers: CTXM, KPC, NDM, VIM, IMP and OXA.    Critical Value/Significant Value called to and read back by Yoko Devine RN [id number] @ 1302.cg 03/14/19     Moderate growth Normal respiratory tabitha     Most Recent TSH, T4 and A1c Labs:  Recent Labs   Lab Test 03/14/19  0152  08/13/18  0849  04/15/16  0030   TSH  --   --  0.99   < > 5.68*   T4  --   --   --   --  0.82   A1C 7.4*   < > 7.2*   < >  --     < > = values in this interval not displayed.     Results for orders placed or performed during the hospital encounter of 03/13/19   Abdomen US, limited (RUQ only)    Narrative    RIGHT UPPER QUADRANT ULTRASOUND 3/13/2019 9:34 PM    HISTORY:  pain, pain    COMPARISON: None.    FINDINGS:    Gallbladder: The gallbladder is contracted. The wall of the  gallbladder is thickened because the gallbladder is contracted. It  measures 0.6 cm in thickness. No gallstones are seen but there is a  small nonshadowing multiple sludge ball within the gallbladder.. The  patient is not focally tender    Bile ducts:   CHD is normal diameter.  No intrahepatic biliary  dilatation.    Liver:   Normal.     Pancreas:  Partially obscured by gas. Visualized portions are  negative.    Right kidney:   Normal.       Impression    IMPRESSION:    1. Contracted gallbladder.  2. Small mobile nonshadowing echogenic focus within the gallbladder.  This is most compatible with a sludge ball.    SHANNON RODRIGUEZ MD   CT Abdomen Pelvis w/o Contrast    Narrative    CT ABDOMEN AND PELVIS WITHOUT CONTRAST 3/13/2019 10:06 PM     HISTORY: Abdominal pain and vomiting.  Negative US. Bili >8, elevated  lactate, WBC 20.    COMPARISON: None.    TECHNIQUE: Axial CT images of the abdomen and pelvis from the  diaphragm to the symphysis pubis were acquired without IV contrast.  Radiation dose for this scan was reduced using automated exposure  control, adjustment of the mA and/or kV according to patient size, or  iterative reconstruction technique.    FINDINGS: There are no stones seen within either kidney, either  ureter, or the bladder. There is no hydroureter or hydronephrosis.  There is no perinephric fat stranding. Kidneys are normal in size and  configuration.  There are moderate atherosclerotic changes of the  visualized aorta and its branches. There is no evidence of aortic  aneurysm. There are no dilated loops of small intestine or large bowel  to suggest ileus or obstruction. No free air or free fluid. No  diverticulitis. Prostatomegaly. Unremarkable appendix. Contracted  gallbladder. Unremarkable liver. No biliary dilatation demonstrated.  No splenomegaly.  No definite adrenal nodules.  Pancreas grossly  unremarkable. The remainder of the visualized abdomen is unremarkable  on this noncontrast scan. Survey of the visualized bony structures  demonstrates no destructive bony lesions.  The visualized lung bases  are unremarkable.       Impression    IMPRESSION: No acute process demonstrated in the abdomen and pelvis.    TONIE WILKS MD

## 2019-03-19 ENCOUNTER — TELEPHONE (OUTPATIENT)
Dept: INTERNAL MEDICINE | Facility: CLINIC | Age: 79
End: 2019-03-19

## 2019-03-19 LAB — COPATH REPORT: NORMAL

## 2019-03-20 LAB
BACTERIA SPEC CULT: NO GROWTH
BACTERIA SPEC CULT: NO GROWTH
Lab: NORMAL
Lab: NORMAL
SPECIMEN SOURCE: NORMAL
SPECIMEN SOURCE: NORMAL

## 2019-03-20 NOTE — TELEPHONE ENCOUNTER
ED / Discharge Outreach Protocol    Patient Contact    Attempt # 3    Was call answered?  No.  Left message on voicemail with information to call me back.  Pt does have appt Friday

## 2019-03-20 NOTE — PROGRESS NOTES
SUBJECTIVE:   Pascual Perea is a 78 year old male who presents to clinic today for the following health issues:    Chief Complaint   Patient presents with     Hospital F/U     Hospital Follow-up Visit:    Hospital/Nursing Home/IP Rehab Facility: [facility]  Date of Admission: 03/13/2019  Date of Discharge: 03/18/2019  Reason(s) for Admission: Hepatotoxicity   No telephone contact after hospital discharge              Problems taking medications regularly:  None       Medication changes since discharge: None       Problems adhering to non-medication therapy:  None      Summary of hospitalization:  [facility] discharge summary reviewed  Diagnostic Tests/Treatments reviewed.  Follow up needed: labs  Other Healthcare Providers Involved in Patient s Care:         General surgery  Update since discharge: improved.     Post Discharge Medication Reconciliation: discharge medications reconciled and changed, per note/orders (see AVS).  Plan of care communicated with patient     Coding guidelines for this visit:  Type of Medical   Decision Making Face-to-Face Visit       within 7 Days of discharge Face-to-Face Visit        within 14 days of discharge   Moderate Complexity [id number]   High Complexity [id number]          Discharge summary reviewed. Part of the summary as below:    Discharge Summary  Hospitalist     Date of Admission:  3/13/2019  Date of Discharge:  3/18/2019  Discharging Provider: Anthony Cruz MD        Primary Care Physician      Andrew Elizalde  Primary Care Provider Phone Number: [phone]  Primary Care Provider Fax Number: [phone]     PRINCIPAL DIAGNOSIS  Acute cholecystitis with sludge/obstructed jaundice status post laparoscopic cholecystectomy 3/17  Acute liver injury /lactic acidosis from above.  E. coli bacteremia likely from abdominal source--cleared  Thrombocytopenia likely dilutional/in the setting of infection  Acute kidney injury likely prerenal:   Mild  hyperkalemia in the setting of acute kidney injury.           History of Present Illness     Pascual Perea is an 78 year old male who presented with abdominal pain          Hospital Course     Pascual Perea is a 78 year old male  with medical history significant for diabetes, coronary artery disease, atrial flutter, hypothyroidism and hypertensionadmitted on 3/13/2019 with nausea vomiting and abdominal pain.     Acute cholecystitis with sludge/obstructed jaundice status post laparoscopic cholecystectomy 3/17  Acute liver injury /lactic acidosis from above.  Presented with nausea vomiting abdominal pain, leukocytosis and jaundice.   , , bilirubin 8.5.  US showed contracted gallbladder and sludge ball in gallbladder.    CT abdomen and pelvis unremarkable, no biliary dilatation.    EUS showed gallbladder sludge without choledocholithiasis  Blood cultures from admission grew E. Coli.  Repeat blood cultures no growth.     Gastroenterology recommended supportive care, general surgery eval.  Underwent laparoscopic cholecystectomy on 3/17 by general surgery.  His Eliquis has been on hold for 48 hours for procedure and restarted today after hemoglobin has been stable.  General surgery recommend okay for discharge.  Was on IV Zosyn (3/13 - 3/17) and started Augmentin 3/18 for 5 days.    Monitor liver function test on PCP visit, continue to hold metformin/pravastatin/gemfibrozil given elevated liver function test.  Avoid hepatotoxic drugs.     E. coli bacteremia likely from abdominal source--cleared  Blood cultures from 3/13 grew E. Coli.  Urine culture 10,000 to 50,000 colonies/mL  Escherichia coli   Blood cultures repeat from 3/14 no growth to date.  Was on IV Zosyn (3/13 - 3/17) and started Augmentin 3/18 for 5 days to complete total 10 days of antibiotics  Follow final blood culture results.     Thrombocytopenia likely dilutional/in the setting of infection  Platelets dropped from 164 to 119K, no  bleeding noted.    Has been receiving IV fluids/IV antibiotics.  Monitor CBC on PCP visit.     Acute kidney injury likely prerenal:   Patient has normal baseline creatinine 0.8-0.9  Presented with creatinine of 2.36 with supportive care trending down to 1.38  Continue to hold PTA lisinopril.  Adequate oral hydration, monitor renal function on PCP visit and consider restarting lisinopril.  Avoid nephrotoxic drugs.     Mild hyperkalemia in the setting of acute kidney injury.    Now resolved continue to hold lisinopril as above      Postoperative urinary retention.  Patient off narcotics today, with time voiding symptoms improving.  Good oral hydration, continue timed voiding.     Atrial flutter:  During hospital on telemetry had atrial flutter.  Intermittent bradycardia, on ambulation heart rate improved to 70s/80s.  Continue PTA amiodarone 20 mg oral daily.  Restarted PTA Eliquis on 3/18 after okay by general surgery.     Hypertension.  Hold PTA lisinopril given acute kidney injury/surgery.  Consider restarting on PCP visit.  Systolic blood pressures fluctuating between 100s-130s.     Hypoglycemia while n.p.o during hospitalization  Type 2 diabetes:  Hemoglobin A1c of 7.4  At time of discharge recommend to continue PTA Levemir 35 to 40 units with mealtime insulin per carb counting.  Hold PTA metformin until PCP visit.   Monitor blood sugars and optimize insulin regimen.     Hypothyroidism:   Continue PTA levothyroxine.     Dyslipidemia:    Hold PTA pravastatin     Patient, RN, care coordinator involved in care and agree with discharge plan     Anthony Cruz MD         Pt's past medical history, family history, habits, medications and allergies were reviewed with the patient today.  See snap shot for  HCM status. Most recent lab results reviewed with pt. Problem list and histories reviewed & adjusted, as indicated.  Additional history as below:    Culture reviewed along with recent lab results.  Patient denies  "abdominal pain.  He has developed significant edema in his legs scrotum and abdomen.  Denies orthopnea or PND.  No chest pain.  Minimal shortness of breath.  Has not been checking blood sugars recently.  Patient off of metformin with renal insufficiency.  Patient currently also off of statin and fenofibrate with recent hepatitis lab results related to biliary issues as above     Additional ROS:   Constitutional, HEENT, Cardiovascular, Pulmonary, GI and , Neuro, MSK and Psych review of systems/symptoms are otherwise negative or unchanged from previous, except as noted above.      OBJECTIVE:  /72   Pulse 100   Temp 97.6  F (36.4  C) (Oral)   Resp 16   Wt 100.2 kg (221 lb)   SpO2 95%   BMI 30.82 kg/m     Estimated body mass index is 30.82 kg/m  as calculated from the following:    Height as of 3/13/19: 1.803 m (5' 11\").    Weight as of this encounter: 100.2 kg (221 lb).     Neck: no adenopathy. Thyroid normal to palpation. No bruits  Pulm: Lungs clear to auscultation   CV: Regular rates and rhythm  GI: Soft, mildly distended,  nontender, Normal active bowel sounds, No hepatosplenomegaly or masses palpable.  Abdominal surgical incisions CDI  Ext: Peripheral pulses intact. 2 plus BLE edema.  : Mild scrotal edema.  No penile discharge.  Penis itself appears normal.  No scrotal masses palpable but exam limited by scrotal edema  Neuro: Normal strength and tone, sensory exam grossly normal    Assessment/Plan: (See plan discussion below for further details)  1. S/P cholecystectomy  Surgical incisions clean and intact.  Follow-up with general surgery as scheduled.  Labs as ordered to ensure resolution of previous liver abnormalities  - Comprehensive metabolic panel    2. Other ascites  Likely related to low protein levels and IVF during hospital stay.  Labs today as ordered.  Will start furosemide 20 mg daily and recheck BMP 1 week.  If symptoms not improving, will check abdominal ultrasound  - Comprehensive " metabolic panel  - TSH with free T4 reflex  - T4 free  - furosemide (LASIX) 20 MG tablet; Take 1 tablet (20 mg) by mouth daily  Dispense: 30 tablet; Refill: 0  - Basic metabolic panel; Future    3. Thrombocytopenia (H)  Likely related to recent gallbladder issues.  Needs lab recheck.  Denies bleeding issues  - CBC with platelets    4. Edema, unspecified type   See #2 above for plan  - Comprehensive metabolic panel  - TSH with free T4 reflex  - furosemide (LASIX) 20 MG tablet; Take 1 tablet (20 mg) by mouth daily  Dispense: 30 tablet; Refill: 0  - Basic metabolic panel; Future    5. Type 2 diabetes mellitus without complication, with long-term current use of insulin (H)  Currently off of metformin.  Will have patient check blood sugars BID in 2 weeks and send me some results. Repeat A1C 3 mos  - Hemoglobin A1c; Future    6. Hyperlipidemia LDL goal <70  Statin therapy currently with recent hepatitis related to gallbladder disease.  Will await normalization of liver tests and then restart  - Comprehensive metabolic panel    7. Essential hypertension, benign  Blood pressure mildly elevated.  We will recheck in a month once other issue stabilized    8. Testosterone deficiency  On testosterone replacement therapy.  Future labs soon as ordered            Andrew Elizalde MD  Internal Medicine Department  [facility]

## 2019-03-22 ENCOUNTER — OFFICE VISIT (OUTPATIENT)
Dept: INTERNAL MEDICINE | Facility: CLINIC | Age: 79
End: 2019-03-22
Payer: COMMERCIAL

## 2019-03-22 VITALS
BODY MASS INDEX: 30.82 KG/M2 | HEART RATE: 100 BPM | WEIGHT: 221 LBS | DIASTOLIC BLOOD PRESSURE: 72 MMHG | TEMPERATURE: 97.6 F | RESPIRATION RATE: 16 BRPM | OXYGEN SATURATION: 95 % | SYSTOLIC BLOOD PRESSURE: 148 MMHG

## 2019-03-22 DIAGNOSIS — R60.9 EDEMA, UNSPECIFIED TYPE: ICD-10-CM

## 2019-03-22 DIAGNOSIS — D69.6 THROMBOCYTOPENIA (H): ICD-10-CM

## 2019-03-22 DIAGNOSIS — R18.8 OTHER ASCITES: ICD-10-CM

## 2019-03-22 DIAGNOSIS — E34.9 TESTOSTERONE DEFICIENCY: ICD-10-CM

## 2019-03-22 DIAGNOSIS — E78.5 HYPERLIPIDEMIA LDL GOAL <70: ICD-10-CM

## 2019-03-22 DIAGNOSIS — Z90.49 S/P CHOLECYSTECTOMY: ICD-10-CM

## 2019-03-22 DIAGNOSIS — Z79.4 TYPE 2 DIABETES MELLITUS WITHOUT COMPLICATION, WITH LONG-TERM CURRENT USE OF INSULIN (H): ICD-10-CM

## 2019-03-22 DIAGNOSIS — I10 ESSENTIAL HYPERTENSION, BENIGN: ICD-10-CM

## 2019-03-22 DIAGNOSIS — E11.9 TYPE 2 DIABETES MELLITUS WITHOUT COMPLICATION, WITH LONG-TERM CURRENT USE OF INSULIN (H): ICD-10-CM

## 2019-03-22 LAB
ALBUMIN SERPL-MCNC: 2.9 G/DL (ref 3.4–5)
ALP SERPL-CCNC: 336 U/L (ref 40–150)
ALT SERPL W P-5'-P-CCNC: 91 U/L (ref 0–70)
ANION GAP SERPL CALCULATED.3IONS-SCNC: 5 MMOL/L (ref 3–14)
AST SERPL W P-5'-P-CCNC: 32 U/L (ref 0–45)
BILIRUB SERPL-MCNC: 1.3 MG/DL (ref 0.2–1.3)
BUN SERPL-MCNC: 12 MG/DL (ref 7–30)
CALCIUM SERPL-MCNC: 9.7 MG/DL (ref 8.5–10.1)
CHLORIDE SERPL-SCNC: 107 MMOL/L (ref 94–109)
CO2 SERPL-SCNC: 28 MMOL/L (ref 20–32)
CREAT SERPL-MCNC: 1.35 MG/DL (ref 0.66–1.25)
ERYTHROCYTE [DISTWIDTH] IN BLOOD BY AUTOMATED COUNT: 16.2 % (ref 10–15)
GFR SERPL CREATININE-BSD FRML MDRD: 50 ML/MIN/{1.73_M2}
GLUCOSE SERPL-MCNC: 124 MG/DL (ref 70–99)
HCT VFR BLD AUTO: 44.4 % (ref 40–53)
HGB BLD-MCNC: 14.5 G/DL (ref 13.3–17.7)
MCH RBC QN AUTO: 28.3 PG (ref 26.5–33)
MCHC RBC AUTO-ENTMCNC: 32.7 G/DL (ref 31.5–36.5)
MCV RBC AUTO: 87 FL (ref 78–100)
PLATELET # BLD AUTO: 193 10E9/L (ref 150–450)
POTASSIUM SERPL-SCNC: 3.8 MMOL/L (ref 3.4–5.3)
PROT SERPL-MCNC: 5.9 G/DL (ref 6.8–8.8)
RBC # BLD AUTO: 5.12 10E12/L (ref 4.4–5.9)
SODIUM SERPL-SCNC: 140 MMOL/L (ref 133–144)
T4 FREE SERPL-MCNC: 1.09 NG/DL (ref 0.76–1.46)
TSH SERPL DL<=0.005 MIU/L-ACNC: 4.85 MU/L (ref 0.4–4)
WBC # BLD AUTO: 6.5 10E9/L (ref 4–11)

## 2019-03-22 PROCEDURE — 84443 ASSAY THYROID STIM HORMONE: CPT | Performed by: INTERNAL MEDICINE

## 2019-03-22 PROCEDURE — 36415 COLL VENOUS BLD VENIPUNCTURE: CPT | Performed by: INTERNAL MEDICINE

## 2019-03-22 PROCEDURE — 84439 ASSAY OF FREE THYROXINE: CPT | Performed by: INTERNAL MEDICINE

## 2019-03-22 PROCEDURE — 85027 COMPLETE CBC AUTOMATED: CPT | Performed by: INTERNAL MEDICINE

## 2019-03-22 PROCEDURE — 80053 COMPREHEN METABOLIC PANEL: CPT | Performed by: INTERNAL MEDICINE

## 2019-03-22 PROCEDURE — 99495 TRANSJ CARE MGMT MOD F2F 14D: CPT | Performed by: INTERNAL MEDICINE

## 2019-03-22 RX ORDER — FUROSEMIDE 20 MG
20 TABLET ORAL DAILY
Qty: 30 TABLET | Refills: 0 | Status: SHIPPED | OUTPATIENT
Start: 2019-03-22 | End: 2019-04-25

## 2019-03-23 ENCOUNTER — MYC MEDICAL ADVICE (OUTPATIENT)
Dept: INTERNAL MEDICINE | Facility: CLINIC | Age: 79
End: 2019-03-23

## 2019-03-26 DIAGNOSIS — E03.9 HYPOTHYROIDISM, UNSPECIFIED TYPE: ICD-10-CM

## 2019-03-26 RX ORDER — LEVOTHYROXINE SODIUM 125 UG/1
TABLET ORAL
Qty: 90 TABLET | Refills: 1 | Status: SHIPPED | OUTPATIENT
Start: 2019-03-26 | End: 2019-10-01

## 2019-03-26 NOTE — TELEPHONE ENCOUNTER
"Requested Prescriptions   Pending Prescriptions Disp Refills     levothyroxine (SYNTHROID/LEVOTHROID) 125 MCG tablet [Pharmacy Med Name: LEVOTHYROXINE SODIUM 125MCG TABS] 90 tablet 3    Last Written Prescription Date:  3/7/2018  Last Fill Quantity: 90,  # refills: 3   Last office visit: 3/22/2019 with prescribing provider:  3/22/2019   Future Office Visit:   Next 5 appointments (look out 90 days)    Mar 27, 2019  9:00 AM CDT  [facility] [address] [id number] [phone]          Sig: TAKE ONE TABLET BY MOUTH EVERY DAY    Thyroid Protocol Failed - 3/26/2019  9:10 AM       Failed - Normal TSH on file in past 12 months    Recent Labs   Lab Test 03/22/19  1453   TSH 4.85*             Passed - Patient is 12 years or older       Passed - Recent (12 mo) or future (30 days) visit within the authorizing provider's specialty    Patient had office visit in the last 12 months or has a visit in the next 30 days with authorizing provider or within the authorizing provider's specialty.  See \"Patient Info\" tab in inbasket, or \"Choose Columns\" in Meds & Orders section of the refill encounter.             Passed - Medication is active on med list          "

## 2019-03-29 DIAGNOSIS — R60.9 EDEMA, UNSPECIFIED TYPE: ICD-10-CM

## 2019-03-29 DIAGNOSIS — E34.9 TESTOSTERONE DEFICIENCY: ICD-10-CM

## 2019-03-29 DIAGNOSIS — R18.8 OTHER ASCITES: ICD-10-CM

## 2019-03-29 LAB
ALBUMIN SERPL-MCNC: 3.2 G/DL (ref 3.4–5)
ALP SERPL-CCNC: 292 U/L (ref 40–150)
ALT SERPL W P-5'-P-CCNC: 67 U/L (ref 0–70)
ANION GAP SERPL CALCULATED.3IONS-SCNC: 5 MMOL/L (ref 3–14)
AST SERPL W P-5'-P-CCNC: 33 U/L (ref 0–45)
BILIRUB DIRECT SERPL-MCNC: 0.8 MG/DL (ref 0–0.2)
BILIRUB SERPL-MCNC: 1.8 MG/DL (ref 0.2–1.3)
BUN SERPL-MCNC: 12 MG/DL (ref 7–30)
CALCIUM SERPL-MCNC: 9.2 MG/DL (ref 8.5–10.1)
CHLORIDE SERPL-SCNC: 105 MMOL/L (ref 94–109)
CO2 SERPL-SCNC: 31 MMOL/L (ref 20–32)
CREAT SERPL-MCNC: 1.44 MG/DL (ref 0.66–1.25)
GFR SERPL CREATININE-BSD FRML MDRD: 46 ML/MIN/{1.73_M2}
GLUCOSE SERPL-MCNC: 93 MG/DL (ref 70–99)
HGB BLD-MCNC: 14.1 G/DL (ref 13.3–17.7)
POTASSIUM SERPL-SCNC: 3.7 MMOL/L (ref 3.4–5.3)
PROT SERPL-MCNC: 6.3 G/DL (ref 6.8–8.8)
SODIUM SERPL-SCNC: 141 MMOL/L (ref 133–144)

## 2019-03-29 PROCEDURE — 36415 COLL VENOUS BLD VENIPUNCTURE: CPT | Performed by: INTERNAL MEDICINE

## 2019-03-29 PROCEDURE — 80076 HEPATIC FUNCTION PANEL: CPT | Performed by: INTERNAL MEDICINE

## 2019-03-29 PROCEDURE — 84270 ASSAY OF SEX HORMONE GLOBUL: CPT | Performed by: INTERNAL MEDICINE

## 2019-03-29 PROCEDURE — 80048 BASIC METABOLIC PNL TOTAL CA: CPT | Performed by: INTERNAL MEDICINE

## 2019-03-29 PROCEDURE — 84403 ASSAY OF TOTAL TESTOSTERONE: CPT | Performed by: INTERNAL MEDICINE

## 2019-03-29 PROCEDURE — 85018 HEMOGLOBIN: CPT | Performed by: INTERNAL MEDICINE

## 2019-03-30 PROBLEM — K71.9 HEPATOTOXICITY: Status: RESOLVED | Noted: 2019-03-14 | Resolved: 2019-03-30

## 2019-04-02 LAB
SHBG SERPL-SCNC: 9 NMOL/L (ref 11–80)
TESTOST FREE SERPL-MCNC: 9.49 NG/DL (ref 4.7–24.4)
TESTOST SERPL-MCNC: 287 NG/DL (ref 240–950)

## 2019-04-04 ENCOUNTER — MYC MEDICAL ADVICE (OUTPATIENT)
Dept: INTERNAL MEDICINE | Facility: CLINIC | Age: 79
End: 2019-04-04

## 2019-04-04 ENCOUNTER — TELEPHONE (OUTPATIENT)
Dept: SURGERY | Facility: CLINIC | Age: 79
End: 2019-04-04

## 2019-04-04 NOTE — TELEPHONE ENCOUNTER
Per Selenokhodhart message, patient reports no improvement with edema in feet. Please advise of possible next steps:    Per LOV note:    2. Other ascites  Likely related to low protein levels and IVF during hospital stay.  Labs today as ordered.  Will start furosemide 20 mg daily and recheck BMP 1 week.  If symptoms not improving, will check abdominal ultrasound  - Comprehensive metabolic panel  - TSH with free T4 reflex  - T4 free  - furosemide (LASIX) 20 MG tablet; Take 1 tablet (20 mg) by mouth daily  Dispense: 30 tablet; Refill: 0  - Basic metabolic panel; Future      4. Edema, unspecified type   See #2 above for plan  - Comprehensive metabolic panel  - TSH with free T4 reflex  - furosemide (LASIX) 20 MG tablet; Take 1 tablet (20 mg) by mouth daily  Dispense: 30 tablet; Refill: 0  - Basic metabolic panel; Future    Oralia COULTER RN, BSN, PHN

## 2019-04-04 NOTE — TELEPHONE ENCOUNTER
[facility]  Post op call note - No Answer    Pascual ALEXANDRA Eliazar was called for an update regarding his recovery.  There was no answer and a message was left instructing the patient to call the office with any questions or concerns.     Cherrie Joe PA-C

## 2019-04-08 DIAGNOSIS — E11.9 TYPE 2 DIABETES MELLITUS WITHOUT COMPLICATION, WITH LONG-TERM CURRENT USE OF INSULIN (H): ICD-10-CM

## 2019-04-08 DIAGNOSIS — Z79.4 TYPE 2 DIABETES MELLITUS WITHOUT COMPLICATION, WITH LONG-TERM CURRENT USE OF INSULIN (H): ICD-10-CM

## 2019-04-09 NOTE — TELEPHONE ENCOUNTER
Prescription approved per G Refill Protocol.    Insulin Routing refill request to provider for review/approval because:  Elevated BP

## 2019-04-09 NOTE — TELEPHONE ENCOUNTER
"Requested Prescriptions   Pending Prescriptions Disp Refills     blood glucose (ONETOUCH ULTRA) test strip [Pharmacy Med Name: ONETOUCH ULTRA BLUE  STRP]  Last Written Prescription Date:  09/23/2017  Last Fill Quantity: 200,  # refills: 03   Last Office Visit: 3/22/2019   Future Office Visit:      200 each 3     Sig: USE TWICE DAILY WITH ONE TOUCH ULTRA BLUE       Diabetic Supplies Protocol Passed - 4/8/2019  6:43 PM        Passed - Medication is active on med list        Passed - Patient is 18 years of age or older        Passed - Recent (6 mo) or future (30 days) visit within the authorizing provider's specialty     Patient had office visit in the last 6 months or has a visit in the next 30 days with authorizing provider.  See \"Patient Info\" tab in inbasket, or \"Choose Columns\" in Meds & Orders section of the refill encounter.            NOVOFINE 30 30G X 8 MM insulin pen needle [Pharmacy Med Name: NOVOFINE AUTOCOVER 30G X 8 MM MISC]  Last Written Prescription Date:  11/06/2018  Last Fill Quantity: 400,  # refills: 0   Last Office Visit: 3/22/2019   Future Office Visit:      400 each 0     Sig: USE FOUR TIMES DAILY. PATIENT NEEDS OFFICE VISIT       Diabetic Supplies Protocol Passed - 4/8/2019  6:43 PM        Passed - Medication is active on med list        Passed - Patient is 18 years of age or older        Passed - Recent (6 mo) or future (30 days) visit within the authorizing provider's specialty     Patient had office visit in the last 6 months or has a visit in the next 30 days with authorizing provider.  See \"Patient Info\" tab in inbasket, or \"Choose Columns\" in Meds & Orders section of the refill encounter.            insulin aspart (NOVOLOG FLEXPEN) 100 UNIT/ML pen [Pharmacy Med Name: NOVOLOG FLEXPEN 100UNIT/ML SOPN]  Last Written Prescription Date:  11/13/2018  Last Fill Quantity: 30mL,  # refills: 0   Last Office Visit: 3/22/2019   Future Office Visit:      30 mL 0     Sig: INJECT  2 UNITS PER CARB CHOICE " "(APPROXIMATELY 5-10 UNITS PER MEAL.) UNDER THE SKIN, THREE TIMES A DAY BEFORE MEALS .       Short Acting Insulin Protocol Failed - 4/8/2019  6:43 PM        Failed - Blood pressure less than 140/90 in past 6 months     BP Readings from Last 3 Encounters:   03/22/19 148/72   03/18/19 155/83   03/13/19 94/56                 Passed - LDL on file in past 12 months     Recent Labs   Lab Test 08/13/18  0849   LDL 38             Passed - Microalbumin on file in past 12 months     Recent Labs   Lab Test 08/13/18  0900   MICROL 18   UMALCR 11.06             Passed - Serum creatinine on file in past 12 months     Recent Labs   Lab Test 03/29/19  0912  03/30/12 2012   CR 1.44*   < >  --    CREAT  --   --  1.7*    < > = values in this interval not displayed.             Passed - HgbA1C in past 3 or 6 months     If HgbA1C is 8 or greater, it needs to be on file within the past 3 months.  If less than 8, must be on file within the past 6 months.     Recent Labs   Lab Test 03/14/19  0152   A1C 7.4*             Passed - Medication is active on med list        Passed - Patient is age 18 or older        Passed - Recent (6 mo) or future (30 days) visit within the authorizing provider's specialty     Patient had office visit in the last 6 months or has a visit in the next 30 days with authorizing provider or within the authorizing provider's specialty.  See \"Patient Info\" tab in inbasket, or \"Choose Columns\" in Meds & Orders section of the refill encounter.              "

## 2019-04-10 ENCOUNTER — TELEPHONE (OUTPATIENT)
Dept: INTERNAL MEDICINE | Facility: CLINIC | Age: 79
End: 2019-04-10

## 2019-04-10 DIAGNOSIS — Z79.4 TYPE 2 DIABETES MELLITUS WITHOUT COMPLICATION, WITH LONG-TERM CURRENT USE OF INSULIN (H): Primary | ICD-10-CM

## 2019-04-10 DIAGNOSIS — E11.9 TYPE 2 DIABETES MELLITUS WITHOUT COMPLICATION, WITH LONG-TERM CURRENT USE OF INSULIN (H): Primary | ICD-10-CM

## 2019-04-10 RX ORDER — LANCETS
EACH MISCELLANEOUS
Qty: 300 EACH | Refills: 3 | Status: ON HOLD | OUTPATIENT
Start: 2019-04-10 | End: 2019-05-22

## 2019-04-10 NOTE — TELEPHONE ENCOUNTER
"Hello!    Day[employer]'s insurance formulary has changed. His [employer] plan is no longer covering the One Touch test strips, they are not preferring Accu-Chek or TruMetrix.    If possible, please send new RX's down for \"No Brand Specified\" Test strips, meter, lancets, and control solution.    Thank you!    Linda Patel,[facility]    "

## 2019-04-19 ENCOUNTER — OFFICE VISIT (OUTPATIENT)
Dept: CARDIOLOGY | Facility: CLINIC | Age: 79
End: 2019-04-19
Payer: COMMERCIAL

## 2019-04-19 ENCOUNTER — DOCUMENTATION ONLY (OUTPATIENT)
Dept: CARDIOLOGY | Facility: CLINIC | Age: 79
End: 2019-04-19

## 2019-04-19 VITALS
DIASTOLIC BLOOD PRESSURE: 87 MMHG | SYSTOLIC BLOOD PRESSURE: 162 MMHG | HEART RATE: 52 BPM | BODY MASS INDEX: 28.28 KG/M2 | HEIGHT: 71 IN | WEIGHT: 202 LBS

## 2019-04-19 DIAGNOSIS — I48.19 PERSISTENT ATRIAL FIBRILLATION (H): ICD-10-CM

## 2019-04-19 DIAGNOSIS — I10 ESSENTIAL HYPERTENSION, BENIGN: ICD-10-CM

## 2019-04-19 DIAGNOSIS — R06.09 DYSPNEA ON EXERTION: ICD-10-CM

## 2019-04-19 DIAGNOSIS — I48.19 PERSISTENT ATRIAL FIBRILLATION (H): Primary | ICD-10-CM

## 2019-04-19 DIAGNOSIS — E78.5 HYPERLIPIDEMIA LDL GOAL <70: ICD-10-CM

## 2019-04-19 LAB
ALBUMIN SERPL-MCNC: 3.7 G/DL (ref 3.4–5)
ALP SERPL-CCNC: 196 U/L (ref 40–150)
ALT SERPL W P-5'-P-CCNC: 46 U/L (ref 0–70)
ANION GAP SERPL CALCULATED.3IONS-SCNC: 6 MMOL/L (ref 3–14)
AST SERPL W P-5'-P-CCNC: 25 U/L (ref 0–45)
BILIRUB SERPL-MCNC: 2.1 MG/DL (ref 0.2–1.3)
BUN SERPL-MCNC: 14 MG/DL (ref 7–30)
CALCIUM SERPL-MCNC: 9.8 MG/DL (ref 8.5–10.1)
CHLORIDE SERPL-SCNC: 105 MMOL/L (ref 94–109)
CO2 SERPL-SCNC: 30 MMOL/L (ref 20–32)
CREAT SERPL-MCNC: 1.4 MG/DL (ref 0.66–1.25)
GFR SERPL CREATININE-BSD FRML MDRD: 48 ML/MIN/{1.73_M2}
GLUCOSE SERPL-MCNC: 113 MG/DL (ref 70–99)
HGB BLD-MCNC: 15.5 G/DL (ref 13.3–17.7)
NT-PROBNP SERPL-MCNC: 985 PG/ML (ref 0–450)
POTASSIUM SERPL-SCNC: 4.4 MMOL/L (ref 3.4–5.3)
PROT SERPL-MCNC: 6.6 G/DL (ref 6.8–8.8)
SODIUM SERPL-SCNC: 141 MMOL/L (ref 133–144)

## 2019-04-19 PROCEDURE — 85018 HEMOGLOBIN: CPT | Performed by: PHYSICIAN ASSISTANT

## 2019-04-19 PROCEDURE — 80053 COMPREHEN METABOLIC PANEL: CPT | Performed by: PHYSICIAN ASSISTANT

## 2019-04-19 PROCEDURE — 36415 COLL VENOUS BLD VENIPUNCTURE: CPT | Performed by: PHYSICIAN ASSISTANT

## 2019-04-19 PROCEDURE — 83880 ASSAY OF NATRIURETIC PEPTIDE: CPT | Performed by: PHYSICIAN ASSISTANT

## 2019-04-19 PROCEDURE — 99215 OFFICE O/P EST HI 40 MIN: CPT | Mod: 25 | Performed by: PHYSICIAN ASSISTANT

## 2019-04-19 PROCEDURE — 93000 ELECTROCARDIOGRAM COMPLETE: CPT | Performed by: PHYSICIAN ASSISTANT

## 2019-04-19 RX ORDER — PRAVASTATIN SODIUM 80 MG/1
80 TABLET ORAL AT BEDTIME
Start: 2019-04-19 | End: 2019-10-22

## 2019-04-19 RX ORDER — LISINOPRIL 30 MG/1
30 TABLET ORAL DAILY
Qty: 90 TABLET | Refills: 3
Start: 2019-04-19 | End: 2019-09-23

## 2019-04-19 ASSESSMENT — MIFFLIN-ST. JEOR: SCORE: 1663.4

## 2019-04-19 NOTE — PATIENT INSTRUCTIONS
1. Get blood work today!  Checking kidneys and liver, as suggested when you left the hospital    2. We'll call with results and I'll speak to Dr. Jarvis re: your heart rhythm. We'll likely change meds at that time.    3. Get echocardiogram to check valves    4. My nurses are Balwinder Chin: [phone]

## 2019-04-19 NOTE — PROGRESS NOTES
HPI:   I had the pleasure of seeing Dayday when he and Kina came for follow up of atrial fibrillation.  He is a 78 year old who sees Dr. Jarvis for his history of:    1. Persistent Atrial Fibrillation first diagnosed 5/2016.  He underwent DC cardioversion, but had recurrent atrial fibrillation 4/2018 requiring DC cardioversion.  He saw Dr. Jarvis 1/2019 at which time he was back in A. fib.  He was initiated on a loading dose of amiodarone, and underwent repeat DC cardioversion 2/13/2019.  He has remained on amiodarone and anticoagulation, though this was briefly interrupted 3/2019 for cholecystectomy (see below)  2.  Recent hospitalization 3/13-3/18/2019 for vomiting and abdominal discomfort.  His blood pressure was only 85 and he was quite dizzy and lightheaded.  He was diagnosed with an acute cholecystitis, and underwent lap cholecystectomy 3/17/2019.  His Eliquis was held in preparation for that surgery, and resumed on 3/18/2019.  During his hospitalization, there was question of atrial arrhythmias but no EKG was done.  3.  Benign essential hypertension  4.  Fluid overload and ascites for which he saw Dr. Elizalde 3/22.  At that time, he was 205 pounds.  Lasix 20 mg daily was started.  5.  Coronary artery disease He underwent bypass surgery in 1997.  Last stress test 4/2018 showed a small area of inferior ischemia.  Echocardiogram 4/2018 showed a preserved ejection fraction.    When Dayday saw Dr. Jarvis 1/2019 he was back in atrial fibrillation.  He was started on amiodarone 200 mg in the morning and 400 mg x 2 weeks, and then decreased to 200 mg daily.  Metoprolol tartrate 50 mg twice daily was discontinued.  He underwent DC cardioversion with Dr. Richard 2/13/2019.  1 single 200 J synchronized DC shock successfully converted him to sinus rhythm.  He was supposed to see me at the end of the month, but missed that appointment.    Subsequently, he was hospitalized 3/13-3/18 with vomiting and abdominal discomfort.  He was found to  "have an acute cholecystitis and underwent lap tarah 3/17/2019.  Eliquis was held 3/15-3/18 as far as I can tell from the discharge summary.  He was restarted on Eliquis upon discharge.  Unfortunately, he was not started back on a lot of his other medications, including lisinopril, due to concerns regarding elevated transaminases and renal insufficiency.  He has not been taking his lisinopril 30 mg daily, metformin 1000 mg twice daily, Lopid, pravastatin 80 mg at night or iron.  The discharge summary indicates that the should be held until he saw his primary and liver/renal function was rechecked.    He ended up seeing Dr. DAY on 3/22 but his major concern was that a significant lower extremity edema and ascites.  All blood work was on showing a creatinine of 1.35 and normal electrolytes with normal CBC.  TSH was elevated but T4 was within normal limits.  Creatinine increased to 1.44 on 3/29, after he had been on Lasix 20 mg daily.  Hemoglobin remains normal.    Dayday tells me that he never got the \"okay\" to restart any of his medication so has remained off of the medications listed above.  He notes that his blood pressure has been quite high.  Lasix to improve his edema, but he still notes belly distention.  He describes having to get up in the middle the night to go sit in a recliner, but thinks it is more because his nasal passages close up.  He has obstructive sleep apnea but has not used his CPAP for years.  He feels more short of breath, and wonders if he went back into atrial fibrillation around the time of his hospitalization.  He says that he felt \"good\" without his typical fatigue and weakness associated with atrial fibrillation for at least a few weeks after he had his cardioversion 2/13.    EKG today, which I over read, showed what appears to be coarse atrial fibrillation.  The rhythm is rather regular, but there is some variation in the RR interval.  LVH with strain is present.  Heart rate was 52 bpm.  He " is currently taking amiodarone 200 mg daily     Echocardiogram 4/2018 showed an EF of 55 to 60%.  No regional wall motion abnormalities were noted.  There was normal left ventricular wall thickness.  RV was mildly dilated with mildly decreased RVSF.  RVSP was 29.7 mmHg plus right atrial pressure.  There was trace mitral regurgitation, moderate aortic sclerosis without evidence of aortic insufficiency or stenosis.  1+ MI was noted.  Tricuspid valve has trace regurgitation.  He was in atrial fibrillation at the time.    Component      Latest Ref Rng & Units 3/13/2019 3/18/2019 3/22/2019 3/29/2019   Bilirubin Total      0.2 - 1.3 mg/dL 8.5 (H) 2.2 (H) 1.3 1.8 (H)   Albumin      3.4 - 5.0 g/dL 3.5 2.9 (L) 2.9 (L) 3.2 (L)   Protein Total      6.8 - 8.8 g/dL 6.3 (L) 6.6 (L) 5.9 (L) 6.3 (L)   Alkaline Phosphatase      40 - 150 U/L 127 333 (H) 336 (H) 292 (H)   ALT      0 - 70 U/L 271 (H) 162 (H) 91 (H) 67   AST      0 - 45 U/L 249 (H) 62 (H) 32 33   Bilirubin Direct      0.0 - 0.2 mg/dL    0.8 (H)     Component      Latest Ref Rng & Units 3/13/2019 3/18/2019 3/22/2019 3/29/2019   Sodium      133 - 144 mmol/L 135 137 140 141   Potassium      3.4 - 5.3 mmol/L 5.7 (H) 4.1 3.8 3.7   Chloride      94 - 109 mmol/L 103 105 107 105   Carbon Dioxide      20 - 32 mmol/L 25 26 28 31   Anion Gap      3 - 14 mmol/L 7 6 5 5   Glucose      70 - 99 mg/dL 230 (H) 141 (H) 124 (H) 93   Urea Nitrogen      7 - 30 mg/dL 31 (H) 14 12 12   Creatinine      0.66 - 1.25 mg/dL 2.36 (H) 1.38 (H) 1.35 (H) 1.44 (H)   GFR Estimate      >60 mL/min/1.73:m2 25 (L) 49 (L) 50 (L) 46 (L)   GFR Estimate If Black      >60 mL/min/1.73:m2 30 (L) 56 (L) 58 (L) 54 (L)   Calcium      8.5 - 10.1 mg/dL 9.7 9.2 9.7 9.2     Component      Latest Ref Rng & Units 3/13/2019 3/17/2019 3/29/2019   Hemoglobin      13.3 - 17.7 g/dL 15.2 13.1 (L) 14.1       Assessment & Plan:    1.  Persistent atrial fibrillation     As above, he is complaints of atrial fibrillation fatigue and  "weakness, and Dr. Jarvis has recommended a rhythm control strategy.  He noted that if this past cardioversion 2/13/2019 failed, he would consider atrial fibrillation ablation versus AV node ablation/pacemaker, noting that the latter would likely not benefit him as much given his symptoms seem to occur in the absence of rapid rate.    His cardioversion was initially successful, but he thinks that he went back into atrial fibrillation around the time of his hospitalization.  His acute cholecystitis may have triggered his atrial fibrillation recurrence.    He remains on amiodarone 200 mg daily.  He has been on Eliquis without interruption since 3/18/2019 (day of discharge)    His EKG appears to show coarse atrial fibrillation.    PLAN:    I will discuss his recurrent arrhythmia with Dr. Jarvis.    We discussed Risks, Benefits and Indications of proceeding with direct current cardioversion (DCCV), including but not limited to use of anesthesia, surface burns, [id number] arrhythmias requiring further treatment, discomfort and stroke.  The patient voiced understanding and understands that a  will be needed given the use of conscious sedation. A consent form will be signed by the procedural physician.  He understands that I will contact him after discussion with Dr. Jarvis      Repeat echocardiogram    2.  \"Ascites\"    He saw Dr. Elizalde on 3/22 for fluid overload, including scrotal edema.    Lasix 20 mg daily was started.  On exam, he has minimal edema and clear lungs, but has JVD and hepatojugular reflux    At home, he thinks his weight has gone up since starting Lasix    PLAN:    I will repeat a CMP.  We will also get a N-terminal pro BNP    He may do better with torsemide given what looks to be more right-sided heart failure.  I will contact him with the results, and plan follow-up based on that.  Of note, he leaves town 4/27 and will be gone until mid May    3.  Benign essential hypertension    Blood pressure is " "terribly high, but it turns out he never restarted his lisinopril 30 mg daily    PLAN:    Check CMP, as above    I anticipate restarting lisinopril 30 mg daily when contact him with the results    Joleen Marrero PA-C, MSPAS      Orders Placed This Encounter   Procedures     Comprehensive metabolic panel     Hemoglobin     N terminal pro BNP outpatient     Echocardiogram Complete     No orders of the defined types were placed in this encounter.    There are no discontinued medications.      Encounter Diagnoses   Name Primary?     Persistent atrial fibrillation (H) Yes     Dyspnea on exertion        CURRENT MEDICATIONS:  Current Outpatient Medications   Medication Sig Dispense Refill     amiodarone (PACERONE/CODARONE) 200 MG tablet Take 1 tablet (200 mg) by mouth every morning And 2 tablets (400 mg) every evening for 2 weeks then 1 tablet every morning (Patient taking differently: Take 200 mg by mouth every morning ) 90 tablet 3     apixaban ANTICOAGULANT (ELIQUIS) 5 MG tablet Take 1 tablet (5 mg) by mouth 2 times daily 60 tablet 11     B-D LUER-FELICITAS SYRINGE 22G X 1-1/2\" 3 ML MISC USE WITH TESTOSTERONE EVERY 2 WEEKS AS DIRECTED 30 each 1     blood glucose (NO BRAND SPECIFIED) test strip Use to test blood sugar 3 times daily or as directed. 300 strip 3     blood glucose calibration (NO BRAND SPECIFIED) solution Use as directed 1 Bottle 11     blood glucose monitoring (NO BRAND SPECIFIED) meter device kit Use to test blood sugar 3 times daily or as directed. 1 kit 0     Cholecalciferol (VITAMIN D3 PO) Take 5,000 Units by mouth daily       fexofenadine (ALLEGRA) 180 MG tablet Take 1 tablet (180 mg) by mouth daily 90 tablet 3     fluticasone (FLONASE) 50 MCG/ACT spray USE ONE TO TWO SPRAYS IN EACH NOSTRIL EVERY DAY 48 g 3     furosemide (LASIX) 20 MG tablet Take 1 tablet (20 mg) by mouth daily 30 tablet 0     GLUCOSE 4 GM OR CHEW as directed 30 11     insulin aspart (NOVOLOG FLEXPEN) 100 UNIT/ML pen Inject 3 times daily " "(before meals). 2 units per carb choice (15gm). Approx 5-10 units/meal. 30 mL 3     insulin detemir (LEVEMIR FLEXPEN/FLEXTOUCH) 100 UNIT/ML pen Inject 35-40 Units Subcutaneous At Bedtime (Patient taking differently: Inject 35 Units Subcutaneous At Bedtime ) 45 mL 3     levothyroxine (SYNTHROID/LEVOTHROID) 125 MCG tablet TAKE ONE TABLET BY MOUTH EVERY DAY 90 tablet 1     nitroglycerin (NITROSTAT) 0.4 MG SL tablet Place 1 tablet (0.4 mg) under the tongue every 5 minutes as needed for chest pain 25 tablet 1     NOVOFINE 30 30G X 8 MM insulin pen needle USE FOUR TIMES DAILY. PATIENT NEEDS OFFICE VISIT 400 each 1     order for DME Equipment being ordered: 3ml syringes and 22G 1 1/2\" needles to use with testosterone inj every 2 weeks 30 Device 1     ranitidine (ZANTAC) 150 MG tablet TAKE ONE TABLET BY MOUTH TWICE A  tablet 2     testosterone cypionate (DEPOTESTOSTERONE) 200 MG/ML injection Inject 1.5 mLs (300 mg) into the muscle every 14 days 10 mL 1     thin (NO BRAND SPECIFIED) lancets Use to check sugars  each 3     cyanocobalamin (VITAMIN B-12) 1000 MCG tablet Take 5 tablets (5,000 mcg) by mouth daily       ferrous fumarate 65 mg, [place]. FE,-Vitamin C 125 mg (VITRON-C)  MG TABS tablet 1 tab twice a day for anemia (Patient not taking: Reported on 4/19/2019) 60 tablet 11     gemfibrozil (LOPID) 600 MG tablet Take 1 tablet (600 mg) by mouth 2 times daily (Patient not taking: Reported on 4/19/2019) 180 tablet 3     lisinopril (PRINIVIL/ZESTRIL) 30 MG tablet Take 1 tablet (30 mg) by mouth daily (Patient not taking: Reported on 4/19/2019) 90 tablet 3     metFORMIN (GLUCOPHAGE) 1000 MG tablet TAKE ONE TABLET BY MOUTH TWO TIMES A DAY WITH MEALS (Patient not taking: Reported on 4/19/2019) 180 tablet 3     pravastatin (PRAVACHOL) 80 MG tablet TAKE ONE TABLET BY MOUTH EVERY NIGHT AT BEDTIME 90 tablet 3       ALLERGIES     Allergies   Allergen Reactions     Omeprazole      diarrhea       PAST MEDICAL " HISTORY:  Past Medical History:   Diagnosis Date     Adhesive capsulitis of shoulder      Anemia 3/10/2014     Anemia, unspecified      Antiplatelet or antithrombotic long-term use      Arrhythmia     Atrial fib/flutter     CAD (coronary artery disease)      History of cardioversion 04/24/2018    a single synchronized shock of 120 joules restored normal sinus rhythm     History of cardioversion 02/13/2019    single shock , 200 joules successul in restoring NSR     Hyperlipidemia LDL goal <70 5/26/2011     Hypertension      Hypothyroidism      Impotence of organic origin      Laceration of finger  June 2010     left thumb s/p sutures     Numbness and tingling     diabetic neuropathy bilateral feet     BRANDON (obstructive sleep apnea)     intolerant of CPAP, uses it occasionally.  Using mandibular device occasionally     Osteopenia      Pulmonary hypertension (H) 4/6/2012     Sensorineural hearing loss, unspecified      Stented coronary artery 1997    CABG      Testosterone deficiency      Type 2 diabetes mellitus without complication  (goal A1C<8) 10/24/2015     Typical atrial flutter (H) 4/18/16     Unspecified hypothyroidism      Vitamin D deficiency 9/3/2011       PAST SURGICAL HISTORY:  Past Surgical History:   Procedure Laterality Date     ANESTHESIA CARDIOVERSION N/A 2/13/2019    Procedure: ANESTHESIA CARDIOVERSION;  Surgeon: GENERIC ANESTHESIA PROVIDER;  Location: [facility] OR     C NONSPECIFIC PROCEDURE  1995    CABG ([place])     C NONSPECIFIC PROCEDURE  8/01    stress echo     CARDIAC SURGERY      bypass in 1997     CARDIOVERSION  04/24/2018     COLONOSCOPY       CORONARY ANGIOGRAPHY ADULT ORDER      4/2/2012     CORONARY ARTERY BYPASS  1997    [facility]     ENDOSCOPIC ULTRASOUND UPPER GASTROINTESTINAL TRACT (GI) N/A 3/14/2019    Procedure: ENDOSCOPIC ULTRASOUND OF UPPER GASTROINTESTINAL TRACT;  Surgeon: Ju Torres MD;  Location: [facility] OR     EXCISE MASS HEAD Left 8/18/2016     Procedure: EXCISE MASS HEAD;  Surgeon: Ivan Hernandez MD;  Location: [facility]     HEART CATH, ANGIOPLASTY  2012     LAPAROSCOPIC CHOLECYSTECTOMY N/A 3/17/2019    Procedure: LAPAROSCOPIC CHOLECYSTECTOMY;  Surgeon: Janel Paz MD;  Location:  OR     PHACOEMULSIFICATION CLEAR CORNEA WITH STANDARD INTRAOCULAR LENS IMPLANT Left 2015    Procedure: PHACOEMULSIFICATION CLEAR CORNEA WITH STANDARD INTRAOCULAR LENS IMPLANT;  Surgeon: Nixon Farnsworth MD;  Location:  [facility]     PHACOEMULSIFICATION CLEAR CORNEA WITH STANDARD INTRAOCULAR LENS IMPLANT Right 2015    Procedure: PHACOEMULSIFICATION CLEAR CORNEA WITH STANDARD INTRAOCULAR LENS IMPLANT;  Surgeon: Nixon Farnsworth MD;  Location:  [facility]     SEPTOPLASTY, TURBINOPLASTY, COMBINED Bilateral 2016    Procedure: COMBINED SEPTOPLASTY, TURBINOPLASTY;  Surgeon: Ivan Hernandez MD;  Location: [facility]       FAMILY HISTORY:  Family History   Problem Relation Age of Onset     Genitourinary Problems Father         d: age 89; ARF     Hypertension Father      Diabetes Mother         d: age 68, CAD     Heart Disease Mother         MI     Myocardial Infarction Mother      Cardiovascular Brother         d:  age 31; CAD     Heart Disease Brother         age 31, MI     Diabetes Sister         b:  1939; DM2       SOCIAL HISTORY:  Social History     Socioeconomic History     Marital status:      Spouse name: None     Number of children: None     Years of education: None     Highest education level: None   Occupational History     None   Social Needs     Financial resource strain: None     Food insecurity:     Worry: None     Inability: None     Transportation needs:     Medical: None     Non-medical: None   Tobacco Use     Smoking status: Former Smoker     Packs/day: 1.00     Years: 6.00     Pack years: 6.00     Last attempt to quit: 1964     Years since quittin.8     Smokeless tobacco: Never Used     Tobacco comment: quit    Substance and  "Sexual Activity     Alcohol use: No     Drug use: No     Sexual activity: Yes     Partners: Female   Lifestyle     Physical activity:     Days per week: None     Minutes per session: None     Stress: None   Relationships     Social connections:     Talks on phone: None     Gets together: None     Attends Restoration service: None     Active member of club or organization: None     Attends meetings of clubs or organizations: None     Relationship status: None     Intimate partner violence:     Fear of current or ex partner: None     Emotionally abused: None     Physically abused: None     Forced sexual activity: None   Other Topics Concern     Parent/sibling w/ CABG, MI or angioplasty before 65F 55M? Not Asked      Service Not Asked     Blood Transfusions Not Asked     Caffeine Concern Yes     Comment: 2 cups coffee a day     Occupational Exposure Not Asked     Hobby Hazards Not Asked     Sleep Concern Yes     Comment: sleep apnea, wears cpap off and on     Stress Concern No     Weight Concern No     Special Diet Yes     Comment: diabetic diet      Back Care Not Asked     Exercise No     Comment: occ walk the mall     Bike Helmet Not Asked     Seat Belt Yes     Self-Exams Not Asked   Social History Narrative     None       Review of Systems:  Skin:  Negative     Eyes:  Positive for glasses  ENT:  Positive for nasal congestion  Respiratory:  Positive for sleep apnea  Cardiovascular:  Negative for;palpitations;chest pain;dizziness;lightheadedness fatigue;Positive for;edema;exercise intolerance  Gastroenterology: Positive for    Genitourinary:  Negative    Musculoskeletal:  Positive for joint pain  Neurologic:  Positive for numbness or tingling of feet  Psychiatric:  Negative    Heme/Lymph/Imm:  Negative    Endocrine:  Positive for diabetes;thyroid disorder;night sweats    Physical Exam:  Vitals: /87   Pulse 52   Ht 1.803 m (5' 11\")   Wt 91.6 kg (202 lb)   BMI 28.17 kg/m      Constitutional:  " cooperative, alert and oriented, well developed, well nourished, in no acute distress        Skin:  warm and dry to the touch, no apparent skin lesions or masses noted        Head:  normocephalic, no masses or lesions        Eyes:  pupils equal and round;sclera white;conjunctivae and lids unremarkable        ENT:  no pallor or cyanosis, dentition good        Neck:    JVP elevated;hepatojugular reflux;JVP 10-12      Chest:  normal breath sounds, clear to auscultation, normal A-P diameter, normal symmetry, normal respiratory excursion, no use of accessory muscles        Cardiac: regular rhythm;normal S1 and S2       systolic ejection murmur   mid diastolic murmur;LUSB;RLSB      Abdomen:  BS normoactive obese Firm belly. BS present.     Vascular: pulses full and equal                                      Extremities and Back:  no deformities, clubbing, cyanosis, erythema observed        Neurological:  no gross motor deficits          Recent Lab Results:  LIPID RESULTS:  Lab Results   Component Value Date    CHOL 105 08/13/2018    HDL 32 (L) 08/13/2018    LDL 38 08/13/2018    TRIG 175 (H) 08/13/2018    CHOLHDLRATIO 3.3 05/08/2015       LIVER ENZYME RESULTS:  Lab Results   Component Value Date    AST 33 03/29/2019    ALT 67 03/29/2019       CBC RESULTS:  Lab Results   Component Value Date    WBC 6.5 03/22/2019    RBC 5.12 03/22/2019    HGB 14.1 03/29/2019    HCT 44.4 03/22/2019    MCV 87 03/22/2019    MCH 28.3 03/22/2019    MCHC 32.7 03/22/2019    RDW 16.2 (H) 03/22/2019     03/22/2019       BMP RESULTS:  Lab Results   Component Value Date     03/29/2019    POTASSIUM 3.7 03/29/2019    CHLORIDE 105 03/29/2019    CO2 31 03/29/2019    ANIONGAP 5 03/29/2019    GLC 93 03/29/2019    BUN 12 03/29/2019    CR 1.44 (H) 03/29/2019    GFRESTIMATED 46 (L) 03/29/2019    GFRESTBLACK 54 (L) 03/29/2019    STARR 9.2 03/29/2019        A1C RESULTS:  Lab Results   Component Value Date    A1C 7.4 (H) 03/14/2019       INR  RESULTS:  Lab Results   Component Value Date    INR 1.09 03/17/2019    INR 1.37 (H) 03/14/2019           CC  Andrew Elizalde MD  [address] [place] [id number]

## 2019-04-19 NOTE — PROGRESS NOTES
Reviewed labs after clinic. LFTs OK.  Still with low protein, but improving. Alk Phos continues to drop. Bili up.    BMP shows stable renal function since hospitalization. Cr still not back to normal. BNP not terribly impressive. Hgb fine.    Called Dayday and left VM asking him to restart lisinopril 30 mg daily for BP and pravastatin 80 mg daily.  Will let Dr. Elizalde know that about labs and ask him if it's OK to restart Metformin and if he really needs FeSO4 given normal Hgb.    Asked him to call back Monday with update AND to determine if he wants to proceed with DCCV given that he maintained SR until his cholecystitis.  Dr. Jarvis said could also do AFib ablation if needed.      Component      Latest Ref Rng & Units 8/13/2018 3/18/2019 3/29/2019 4/19/2019   Hemoglobin      13.3 - 17.7 g/dL 15.6 15.3 14.1 15.5     Component      Latest Ref Rng & Units 4/16/2018 4/19/2019   N-Terminal Pro BNP Inpatient      0 - 1,800 pg/mL 1,793    N-Terminal Pro Bnp      0 - 450 pg/mL  985 (H)     Component      Latest Ref Rng & Units 8/13/2018 3/22/2019 3/29/2019 4/19/2019   Bilirubin Total      0.2 - 1.3 mg/dL 1.1 1.3 1.8 (H) 2.1 (H)   Albumin      3.4 - 5.0 g/dL 4.1 2.9 (L) 3.2 (L) 3.7   Protein Total      6.8 - 8.8 g/dL 6.9 5.9 (L) 6.3 (L) 6.6 (L)   Alkaline Phosphatase      40 - 150 U/L 58 336 (H) 292 (H) 196 (H)   ALT      0 - 70 U/L 21 91 (H) 67 46   AST      0 - 45 U/L 18 32 33 25   Bilirubin Direct      0.0 - 0.2 mg/dL   0.8 (H)      Component      Latest Ref Rng & Units 8/13/2018 3/22/2019 3/29/2019 4/19/2019   Potassium      3.4 - 5.3 mmol/L 4.3 3.8  4.4   Chloride      94 - 109 mmol/L 105 107  105   Carbon Dioxide      20 - 32 mmol/L 30 28  30   Anion Gap      3 - 14 mmol/L 6 5  6   Glucose      70 - 99 mg/dL 97 124 (H)  113 (H)   Urea Nitrogen      7 - 30 mg/dL 15 12  14   Creatinine      0.66 - 1.25 mg/dL 0.97 1.35 (H)  1.40 (H)   GFR Estimate      >60 mL/min/1.73:m2 75 50 (L)  48 (L)   GFR Estimate If Black      >60  mL/min/1.73:m2 >90 58 (L)  55 (L)   Calcium      8.5 - 10.1 mg/dL 9.0 9.7  9.8

## 2019-04-19 NOTE — LETTER
4/19/2019    Andrew Elizalde MD  [address] [facility] [address]    RE: Pascual Perea       Dear Colleague,    I had the pleasure of seeing Pascual NASRIN Perea in the [facility].    HPI:   I had the pleasure of seeing Dayday when he and Kina came for follow up of atrial fibrillation.  He is a 78 year old who sees Dr. Jarvis for his history of:    1. Persistent Atrial Fibrillation first diagnosed 5/2016.  He underwent DC cardioversion, but had recurrent atrial fibrillation 4/2018 requiring DC cardioversion.  He saw Dr. Jarvis 1/2019 at which time he was back in A. fib.  He was initiated on a loading dose of amiodarone, and underwent repeat DC cardioversion 2/13/2019.  He has remained on amiodarone and anticoagulation, though this was briefly interrupted 3/2019 for cholecystectomy (see below)  2.  Recent hospitalization 3/13-3/18/2019 for vomiting and abdominal discomfort.  His blood pressure was only 85 and he was quite dizzy and lightheaded.  He was diagnosed with an acute cholecystitis, and underwent lap cholecystectomy 3/17/2019.  His Eliquis was held in preparation for that surgery, and resumed on 3/18/2019.  During his hospitalization, there was question of atrial arrhythmias but no EKG was done.  3.  Benign essential hypertension  4.  Fluid overload and ascites for which he saw Dr. Elizalde 3/22.  At that time, he was 205 pounds.  Lasix 20 mg daily was started.  5.  Coronary artery disease He underwent bypass surgery in 1997.  Last stress test 4/2018 showed a small area of inferior ischemia.  Echocardiogram 4/2018 showed a preserved ejection fraction.    When Dayday saw Dr. Jarvis 1/2019 he was back in atrial fibrillation.  He was started on amiodarone 200 mg in the morning and 400 mg x 2 weeks, and then decreased to 200 mg daily.  Metoprolol tartrate 50 mg twice daily was discontinued.  He underwent DC cardioversion with Dr. Richard 2/13/2019.  1 single 200 J synchronized DC shock successfully  "converted him to sinus rhythm.  He was supposed to see me at the end of the month, but missed that appointment.    Subsequently, he was hospitalized 3/13-3/18 with vomiting and abdominal discomfort.  He was found to have an acute cholecystitis and underwent lap tarah 3/17/2019.  Eliquis was held 3/15-3/18 as far as I can tell from the discharge summary.  He was restarted on Eliquis upon discharge.  Unfortunately, he was not started back on a lot of his other medications, including lisinopril, due to concerns regarding elevated transaminases and renal insufficiency.  He has not been taking his lisinopril 30 mg daily, metformin 1000 mg twice daily, Lopid, pravastatin 80 mg at night or iron.  The discharge summary indicates that the should be held until he saw his primary and liver/renal function was rechecked.    He ended up seeing Dr. DAY on 3/22 but his major concern was that a significant lower extremity edema and ascites.  All blood work was on showing a creatinine of 1.35 and normal electrolytes with normal CBC.  TSH was elevated but T4 was within normal limits.  Creatinine increased to 1.44 on 3/29, after he had been on Lasix 20 mg daily.  Hemoglobin remains normal.    Dayday tells me that he never got the \"okay\" to restart any of his medication so has remained off of the medications listed above.  He notes that his blood pressure has been quite high.  Lasix to improve his edema, but he still notes belly distention.  He describes having to get up in the middle the night to go sit in a recliner, but thinks it is more because his nasal passages close up.  He has obstructive sleep apnea but has not used his CPAP for years.  He feels more short of breath, and wonders if he went back into atrial fibrillation around the time of his hospitalization.  He says that he felt \"good\" without his typical fatigue and weakness associated with atrial fibrillation for at least a few weeks after he had his cardioversion 2/13.    EKG " today, which I over read, showed what appears to be coarse atrial fibrillation.  The rhythm is rather regular, but there is some variation in the RR interval.  LVH with strain is present.  Heart rate was 52 bpm.  He is currently taking amiodarone 200 mg daily     Echocardiogram 4/2018 showed an EF of 55 to 60%.  No regional wall motion abnormalities were noted.  There was normal left ventricular wall thickness.  RV was mildly dilated with mildly decreased RVSF.  RVSP was 29.7 mmHg plus right atrial pressure.  There was trace mitral regurgitation, moderate aortic sclerosis without evidence of aortic insufficiency or stenosis.  1+ HI was noted.  Tricuspid valve has trace regurgitation.  He was in atrial fibrillation at the time.    Component      Latest Ref Rng & Units 3/13/2019 3/18/2019 3/22/2019 3/29/2019   Bilirubin Total      0.2 - 1.3 mg/dL 8.5 (H) 2.2 (H) 1.3 1.8 (H)   Albumin      3.4 - 5.0 g/dL 3.5 2.9 (L) 2.9 (L) 3.2 (L)   Protein Total      6.8 - 8.8 g/dL 6.3 (L) 6.6 (L) 5.9 (L) 6.3 (L)   Alkaline Phosphatase      40 - 150 U/L 127 333 (H) 336 (H) 292 (H)   ALT      0 - 70 U/L 271 (H) 162 (H) 91 (H) 67   AST      0 - 45 U/L 249 (H) 62 (H) 32 33   Bilirubin Direct      0.0 - 0.2 mg/dL    0.8 (H)     Component      Latest Ref Rng & Units 3/13/2019 3/18/2019 3/22/2019 3/29/2019   Sodium      133 - 144 mmol/L 135 137 140 141   Potassium      3.4 - 5.3 mmol/L 5.7 (H) 4.1 3.8 3.7   Chloride      94 - 109 mmol/L 103 105 107 105   Carbon Dioxide      20 - 32 mmol/L 25 26 28 31   Anion Gap      3 - 14 mmol/L 7 6 5 5   Glucose      70 - 99 mg/dL 230 (H) 141 (H) 124 (H) 93   Urea Nitrogen      7 - 30 mg/dL 31 (H) 14 12 12   Creatinine      0.66 - 1.25 mg/dL 2.36 (H) 1.38 (H) 1.35 (H) 1.44 (H)   GFR Estimate      >60 mL/min/1.73:m2 25 (L) 49 (L) 50 (L) 46 (L)   GFR Estimate If Black      >60 mL/min/1.73:m2 30 (L) 56 (L) 58 (L) 54 (L)   Calcium      8.5 - 10.1 mg/dL 9.7 9.2 9.7 9.2     Component      Latest Ref Rng &  "Units 3/13/2019 3/17/2019 3/29/2019   Hemoglobin      13.3 - 17.7 g/dL 15.2 13.1 (L) 14.1       Assessment & Plan:    1.  Persistent atrial fibrillation     As above, he is complaints of atrial fibrillation fatigue and weakness, and Dr. Jarvis has recommended a rhythm control strategy.  He noted that if this past cardioversion 2/13/2019 failed, he would consider atrial fibrillation ablation versus AV node ablation/pacemaker, noting that the latter would likely not benefit him as much given his symptoms seem to occur in the absence of rapid rate.    His cardioversion was initially successful, but he thinks that he went back into atrial fibrillation around the time of his hospitalization.  His acute cholecystitis may have triggered his atrial fibrillation recurrence.    He remains on amiodarone 200 mg daily.  He has been on Eliquis without interruption since 3/18/2019 (day of discharge)    His EKG appears to show coarse atrial fibrillation.    PLAN:    I will discuss his recurrent arrhythmia with Dr. Jarvis.    We discussed Risks, Benefits and Indications of proceeding with direct current cardioversion (DCCV), including but not limited to use of anesthesia, surface burns, [id number] arrhythmias requiring further treatment, discomfort and stroke.  The patient voiced understanding and understands that a  will be needed given the use of conscious sedation. A consent form will be signed by the procedural physician.  He understands that I will contact him after discussion with Dr. Jarvis      Repeat echocardiogram    2.  \"Ascites\"    He saw Dr. Elizalde on 3/22 for fluid overload, including scrotal edema.    Lasix 20 mg daily was started.  On exam, he has minimal edema and clear lungs, but has JVD and hepatojugular reflux    At home, he thinks his weight has gone up since starting Lasix    PLAN:    I will repeat a CMP.  We will also get a N-terminal pro BNP    He may do better with torsemide given what looks to be more " "right-sided heart failure.  I will contact him with the results, and plan follow-up based on that.  Of note, he leaves town 4/27 and will be gone until mid May    3.  Benign essential hypertension    Blood pressure is terribly high, but it turns out he never restarted his lisinopril 30 mg daily    PLAN:    Check CMP, as above    I anticipate restarting lisinopril 30 mg daily when contact him with the results    Joleen Marrero PA-C, MSPAS      Orders Placed This Encounter   Procedures     Comprehensive metabolic panel     Hemoglobin     N terminal pro BNP outpatient     Echocardiogram Complete     No orders of the defined types were placed in this encounter.    There are no discontinued medications.      Encounter Diagnoses   Name Primary?     Persistent atrial fibrillation (H) Yes     Dyspnea on exertion        CURRENT MEDICATIONS:  Current Outpatient Medications   Medication Sig Dispense Refill     amiodarone (PACERONE/CODARONE) 200 MG tablet Take 1 tablet (200 mg) by mouth every morning And 2 tablets (400 mg) every evening for 2 weeks then 1 tablet every morning (Patient taking differently: Take 200 mg by mouth every morning ) 90 tablet 3     apixaban ANTICOAGULANT (ELIQUIS) 5 MG tablet Take 1 tablet (5 mg) by mouth 2 times daily 60 tablet 11     B-D LUER-FELICITAS SYRINGE 22G X 1-1/2\" 3 ML MISC USE WITH TESTOSTERONE EVERY 2 WEEKS AS DIRECTED 30 each 1     blood glucose (NO BRAND SPECIFIED) test strip Use to test blood sugar 3 times daily or as directed. 300 strip 3     blood glucose calibration (NO BRAND SPECIFIED) solution Use as directed 1 Bottle 11     blood glucose monitoring (NO BRAND SPECIFIED) meter device kit Use to test blood sugar 3 times daily or as directed. 1 kit 0     Cholecalciferol (VITAMIN D3 PO) Take 5,000 Units by mouth daily       fexofenadine (ALLEGRA) 180 MG tablet Take 1 tablet (180 mg) by mouth daily 90 tablet 3     fluticasone (FLONASE) 50 MCG/ACT spray USE ONE TO TWO SPRAYS IN EACH NOSTRIL EVERY " "DAY 48 g 3     furosemide (LASIX) 20 MG tablet Take 1 tablet (20 mg) by mouth daily 30 tablet 0     GLUCOSE 4 GM OR CHEW as directed 30 11     insulin aspart (NOVOLOG FLEXPEN) 100 UNIT/ML pen Inject 3 times daily (before meals). 2 units per carb choice (15gm). Approx 5-10 units/meal. 30 mL 3     insulin detemir (LEVEMIR FLEXPEN/FLEXTOUCH) 100 UNIT/ML pen Inject 35-40 Units Subcutaneous At Bedtime (Patient taking differently: Inject 35 Units Subcutaneous At Bedtime ) 45 mL 3     levothyroxine (SYNTHROID/LEVOTHROID) 125 MCG tablet TAKE ONE TABLET BY MOUTH EVERY DAY 90 tablet 1     nitroglycerin (NITROSTAT) 0.4 MG SL tablet Place 1 tablet (0.4 mg) under the tongue every 5 minutes as needed for chest pain 25 tablet 1     NOVOFINE 30 30G X 8 MM insulin pen needle USE FOUR TIMES DAILY. PATIENT NEEDS OFFICE VISIT 400 each 1     order for DME Equipment being ordered: 3ml syringes and 22G 1 1/2\" needles to use with testosterone inj every 2 weeks 30 Device 1     ranitidine (ZANTAC) 150 MG tablet TAKE ONE TABLET BY MOUTH TWICE A  tablet 2     testosterone cypionate (DEPOTESTOSTERONE) 200 MG/ML injection Inject 1.5 mLs (300 mg) into the muscle every 14 days 10 mL 1     thin (NO BRAND SPECIFIED) lancets Use to check sugars  each 3     cyanocobalamin (VITAMIN B-12) 1000 MCG tablet Take 5 tablets (5,000 mcg) by mouth daily       ferrous fumarate 65 mg, [place]. FE,-Vitamin C 125 mg (VITRON-C)  MG TABS tablet 1 tab twice a day for anemia (Patient not taking: Reported on 4/19/2019) 60 tablet 11     gemfibrozil (LOPID) 600 MG tablet Take 1 tablet (600 mg) by mouth 2 times daily (Patient not taking: Reported on 4/19/2019) 180 tablet 3     lisinopril (PRINIVIL/ZESTRIL) 30 MG tablet Take 1 tablet (30 mg) by mouth daily (Patient not taking: Reported on 4/19/2019) 90 tablet 3     metFORMIN (GLUCOPHAGE) 1000 MG tablet TAKE ONE TABLET BY MOUTH TWO TIMES A DAY WITH MEALS (Patient not taking: Reported on 4/19/2019) 180 tablet " 3     pravastatin (PRAVACHOL) 80 MG tablet TAKE ONE TABLET BY MOUTH EVERY NIGHT AT BEDTIME 90 tablet 3       ALLERGIES     Allergies   Allergen Reactions     Omeprazole      diarrhea       PAST MEDICAL HISTORY:  Past Medical History:   Diagnosis Date     Adhesive capsulitis of shoulder      Anemia 3/10/2014     Anemia, unspecified      Antiplatelet or antithrombotic long-term use      Arrhythmia     Atrial fib/flutter     CAD (coronary artery disease)      History of cardioversion 04/24/2018    a single synchronized shock of 120 joules restored normal sinus rhythm     History of cardioversion 02/13/2019    single shock , 200 joules successul in restoring NSR     Hyperlipidemia LDL goal <70 5/26/2011     Hypertension      Hypothyroidism      Impotence of organic origin      Laceration of finger  June 2010     left thumb s/p sutures     Numbness and tingling     diabetic neuropathy bilateral feet     BRANDON (obstructive sleep apnea)     intolerant of CPAP, uses it occasionally.  Using mandibular device occasionally     Osteopenia      Pulmonary hypertension (H) 4/6/2012     Sensorineural hearing loss, unspecified      Stented coronary artery 1997    CABG      Testosterone deficiency      Type 2 diabetes mellitus without complication  (goal A1C<8) 10/24/2015     Typical atrial flutter (H) 4/18/16     Unspecified hypothyroidism      Vitamin D deficiency 9/3/2011       PAST SURGICAL HISTORY:  Past Surgical History:   Procedure Laterality Date     ANESTHESIA CARDIOVERSION N/A 2/13/2019    Procedure: ANESTHESIA CARDIOVERSION;  Surgeon: GENERIC ANESTHESIA PROVIDER;  Location: [facility] OR     C NONSPECIFIC PROCEDURE  1995    CABG ([facility])     C NONSPECIFIC PROCEDURE  8/01    stress echo     CARDIAC SURGERY      bypass in 1997     CARDIOVERSION  04/24/2018     COLONOSCOPY       CORONARY ANGIOGRAPHY ADULT ORDER      4/2/2012     CORONARY ARTERY BYPASS  1997    [facility]     ENDOSCOPIC ULTRASOUND UPPER  GASTROINTESTINAL TRACT (GI) N/A 3/14/2019    Procedure: ENDOSCOPIC ULTRASOUND OF UPPER GASTROINTESTINAL TRACT;  Surgeon: Ju Torres MD;  Location:  OR     EXCISE MASS HEAD Left 8/18/2016    Procedure: EXCISE MASS HEAD;  Surgeon: Ivan Hernandez MD;  Location: [facility]     HEART CATH, ANGIOPLASTY  4/2012     LAPAROSCOPIC CHOLECYSTECTOMY N/A 3/17/2019    Procedure: LAPAROSCOPIC CHOLECYSTECTOMY;  Surgeon: Janel Paz MD;  Location:  OR     PHACOEMULSIFICATION CLEAR CORNEA WITH STANDARD INTRAOCULAR LENS IMPLANT Left 6/8/2015    Procedure: PHACOEMULSIFICATION CLEAR CORNEA WITH STANDARD INTRAOCULAR LENS IMPLANT;  Surgeon: Nixon Farnsworth MD;  Location:  [facility]     PHACOEMULSIFICATION CLEAR CORNEA WITH STANDARD INTRAOCULAR LENS IMPLANT Right 6/22/2015    Procedure: PHACOEMULSIFICATION CLEAR CORNEA WITH STANDARD INTRAOCULAR LENS IMPLANT;  Surgeon: Nixon Farnsworth MD;  Location:  [facility]     SEPTOPLASTY, TURBINOPLASTY, COMBINED Bilateral 8/18/2016    Procedure: COMBINED SEPTOPLASTY, TURBINOPLASTY;  Surgeon: Ivan Hernandez MD;  Location: [facility]       FAMILY HISTORY:  Family History   Problem Relation Age of Onset     Genitourinary Problems Father         d: age 89; ARF     Hypertension Father      Diabetes Mother         d: age 68, CAD     Heart Disease Mother         MI     Myocardial Infarction Mother      Cardiovascular Brother         d:  age 31; CAD     Heart Disease Brother         age 31, MI     Diabetes Sister         b:  1939; DM2       SOCIAL HISTORY:  Social History     Socioeconomic History     Marital status:      Spouse name: None     Number of children: None     Years of education: None     Highest education level: None   Occupational History     None   Social Needs     Financial resource strain: None     Food insecurity:     Worry: None     Inability: None     Transportation needs:     Medical: None     Non-medical: None   Tobacco Use     Smoking status: Former  Smoker     Packs/day: 1.00     Years: 6.00     Pack years: 6.00     Last attempt to quit: 1964     Years since quittin.8     Smokeless tobacco: Never Used     Tobacco comment: quit    Substance and Sexual Activity     Alcohol use: No     Drug use: No     Sexual activity: Yes     Partners: Female   Lifestyle     Physical activity:     Days per week: None     Minutes per session: None     Stress: None   Relationships     Social connections:     Talks on phone: None     Gets together: None     Attends Sabianist service: None     Active member of club or organization: None     Attends meetings of clubs or organizations: None     Relationship status: None     Intimate partner violence:     Fear of current or ex partner: None     Emotionally abused: None     Physically abused: None     Forced sexual activity: None   Other Topics Concern     Parent/sibling w/ CABG, MI or angioplasty before 65F 55M? Not Asked      Service Not Asked     Blood Transfusions Not Asked     Caffeine Concern Yes     Comment: 2 cups coffee a day     Occupational Exposure Not Asked     Hobby Hazards Not Asked     Sleep Concern Yes     Comment: sleep apnea, wears cpap off and on     Stress Concern No     Weight Concern No     Special Diet Yes     Comment: diabetic diet      Back Care Not Asked     Exercise No     Comment: occ walk the mall     Bike Helmet Not Asked     Seat Belt Yes     Self-Exams Not Asked   Social History Narrative     None       Review of Systems:  Skin:  Negative     Eyes:  Positive for glasses  ENT:  Positive for nasal congestion  Respiratory:  Positive for sleep apnea  Cardiovascular:  Negative for;palpitations;chest pain;dizziness;lightheadedness fatigue;Positive for;edema;exercise intolerance  Gastroenterology: Positive for    Genitourinary:  Negative    Musculoskeletal:  Positive for joint pain  Neurologic:  Positive for numbness or tingling of feet  Psychiatric:  Negative    Heme/Lymph/Imm:  Negative   "  Endocrine:  Positive for diabetes;thyroid disorder;night sweats    Physical Exam:  Vitals: /87   Pulse 52   Ht 1.803 m (5' 11\")   Wt 91.6 kg (202 lb)   BMI 28.17 kg/m       Constitutional:  cooperative, alert and oriented, well developed, well nourished, in no acute distress        Skin:  warm and dry to the touch, no apparent skin lesions or masses noted        Head:  normocephalic, no masses or lesions        Eyes:  pupils equal and round;sclera white;conjunctivae and lids unremarkable        ENT:  no pallor or cyanosis, dentition good        Neck:    JVP elevated;hepatojugular reflux;JVP 10-12      Chest:  normal breath sounds, clear to auscultation, normal A-P diameter, normal symmetry, normal respiratory excursion, no use of accessory muscles        Cardiac: regular rhythm;normal S1 and S2       systolic ejection murmur   mid diastolic murmur;LUSB;RLSB      Abdomen:  BS normoactive obese Firm belly. BS present.     Vascular: pulses full and equal                                      Extremities and Back:  no deformities, clubbing, cyanosis, erythema observed        Neurological:  no gross motor deficits          Recent Lab Results:  LIPID RESULTS:  Lab Results   Component Value Date    CHOL 105 08/13/2018    HDL 32 (L) 08/13/2018    LDL 38 08/13/2018    TRIG 175 (H) 08/13/2018    CHOLHDLRATIO 3.3 05/08/2015       LIVER ENZYME RESULTS:  Lab Results   Component Value Date    AST 33 03/29/2019    ALT 67 03/29/2019       CBC RESULTS:  Lab Results   Component Value Date    WBC 6.5 03/22/2019    RBC 5.12 03/22/2019    HGB 14.1 03/29/2019    HCT 44.4 03/22/2019    MCV 87 03/22/2019    MCH 28.3 03/22/2019    MCHC 32.7 03/22/2019    RDW 16.2 (H) 03/22/2019     03/22/2019       BMP RESULTS:  Lab Results   Component Value Date     03/29/2019    POTASSIUM 3.7 03/29/2019    CHLORIDE 105 03/29/2019    CO2 31 03/29/2019    ANIONGAP 5 03/29/2019    GLC 93 03/29/2019    BUN 12 03/29/2019    CR 1.44 (H) " 03/29/2019    GFRESTIMATED 46 (L) 03/29/2019    GFRESTBLACK 54 (L) 03/29/2019    STARR 9.2 03/29/2019        A1C RESULTS:  Lab Results   Component Value Date    A1C 7.4 (H) 03/14/2019       INR RESULTS:  Lab Results   Component Value Date    INR 1.09 03/17/2019    INR 1.37 (H) 03/14/2019           CC  Andrew Elizalde MD  [address] [place] [address]                    Thank you for allowing me to participate in the care of your patient.      Sincerely,     Rebekah Marrero PA-C     [facility]

## 2019-04-19 NOTE — PROGRESS NOTES
Juancarlos Hoffman I saw Dayday today and reviewed recurrent atrial fibrillation following cholecystitis and cholecystectomy.    Unfortunately, he did not restart MANY meds after his hospitalization, as he was instructed to wait until BMP/LFTs checked. Though you checked them due to ascites, he didn't hear back that he should restart things, so he's been off lisinopril 30 mg daily, metformin 1000 mg twice daily, Lopid, pravastatin 80 mg at night and iron    I reviewed labs after clinic and asked him to restart lisinopril 30 and pravastatin 80.  I told him I'd let you know about the labs and would defer decisions on Metformin and Lopid and Hgb (now with normal Hgb) to you.    He's to call me Monday with decision on repeat DCCV vs PVI.    THANKS!  Joleen    Component      Latest Ref Rng & Units 8/13/2018 3/18/2019 3/29/2019 4/19/2019   Hemoglobin      13.3 - 17.7 g/dL 15.6 15.3 14.1 15.5     Component      Latest Ref Rng & Units 4/16/2018 4/19/2019   N-Terminal Pro BNP Inpatient      0 - 1,800 pg/mL 1,793     N-Terminal Pro Bnp      0 - 450 pg/mL   985     Component      Latest Ref Rng & Units 8/13/2018 3/22/2019 3/29/2019 4/19/2019   Bilirubin Total      0.2 - 1.3 mg/dL 1.1 1.3 1.8 (H) 2.1 (H)   Albumin      3.4 - 5.0 g/dL 4.1 2.9 (L) 3.2 (L) 3.7   Protein Total      6.8 - 8.8 g/dL 6.9 5.9 (L) 6.3 (L) 6.6 (L)   Alkaline Phosphatase      40 - 150 U/L 58 336 (H) 292 (H) 196 (H)   ALT      0 - 70 U/L 21 91 (H) 67 46   AST      0 - 45 U/L 18 32 33 25   Bilirubin Direct      0.0 - 0.2 mg/dL     0.8 (H)           Component      Latest Ref Rng & Units 8/13/2018 3/22/2019 3/29/2019 4/19/2019   Potassium      3.4 - 5.3 mmol/L 4.3 3.8   4.4   Chloride      94 - 109 mmol/L 105 107   105   Carbon Dioxide      20 - 32 mmol/L 30 28   30   Anion Gap      3 - 14 mmol/L 6 5   6   Glucose      70 - 99 mg/dL 97 124 (H)   113 (H)   Urea Nitrogen      7 - 30 mg/dL 15 12   14   Creatinine      0.66 - 1.25 mg/dL 0.97 1.35 (H)   1.40 (H)   GFR  Estimate      >60 mL/min/1.73:m2 75 50 (L)   48 (L)   GFR Estimate If Black      >60 mL/min/1.73:m2 >90 58 (L)   55 (L)   Calcium      8.5 - 10.1 mg/dL 9.0 9.7   9.8

## 2019-04-23 ENCOUNTER — TELEPHONE (OUTPATIENT)
Dept: CARDIOLOGY | Facility: CLINIC | Age: 79
End: 2019-04-23

## 2019-04-23 DIAGNOSIS — I10 BENIGN ESSENTIAL HYPERTENSION: Primary | ICD-10-CM

## 2019-04-23 NOTE — TELEPHONE ENCOUNTER
04/23/19 Pt returned call and discussed DCCV vs Ablation. After speaking with Joleen Marrero, she feels DCCV would be appropriate choice based on the fact that it has worked for him before and that the recent Afib was likely cause by his acute cholecystitis. Pt voiced he would like to proceed with DCCV. Only available spot this week is 4/25 for which pt is unavailable. Going on vacation 4/27-5/11. Will call back to schedule when he returns from vacation. Christian 4:50 pm

## 2019-04-24 ENCOUNTER — TELEPHONE (OUTPATIENT)
Dept: CARDIOLOGY | Facility: CLINIC | Age: 79
End: 2019-04-24

## 2019-04-24 NOTE — TELEPHONE ENCOUNTER
Thx for update. His HR is under good control and he's on Eliquis.    Pls have him see me when he gets back from vacation so we can see how he's doing on the lisinopril 30 mg daily (I've ordered). We can discuss DCCV vs PVI at that time.    For now, would keep him on amiodarone as we're going to try to get him back into SR once he returns.    Thx - K

## 2019-04-24 NOTE — TELEPHONE ENCOUNTER
Called pt per Joleen ratliff to schedule appt after pt returns from vacation to [facility] how he is doing on Lisinopril and discuss DCCV. Appt made for Monday 5/13 at 1:10 pm. Pt in agreement. Christian 04/24/19 9:30 am

## 2019-04-25 ENCOUNTER — HOSPITAL ENCOUNTER (OUTPATIENT)
Dept: CARDIOLOGY | Facility: CLINIC | Age: 79
Discharge: HOME | End: 2019-04-25
Attending: PHYSICIAN ASSISTANT | Admitting: PHYSICIAN ASSISTANT
Payer: COMMERCIAL

## 2019-04-25 ENCOUNTER — OFFICE VISIT (OUTPATIENT)
Dept: INTERNAL MEDICINE | Facility: CLINIC | Age: 79
End: 2019-04-25
Payer: COMMERCIAL

## 2019-04-25 DIAGNOSIS — Z79.4 TYPE 2 DIABETES MELLITUS WITHOUT COMPLICATION, WITH LONG-TERM CURRENT USE OF INSULIN (H): ICD-10-CM

## 2019-04-25 DIAGNOSIS — R60.9 EDEMA, UNSPECIFIED TYPE: ICD-10-CM

## 2019-04-25 DIAGNOSIS — I48.19 PERSISTENT ATRIAL FIBRILLATION (H): ICD-10-CM

## 2019-04-25 DIAGNOSIS — E11.9 TYPE 2 DIABETES MELLITUS WITHOUT COMPLICATION, WITH LONG-TERM CURRENT USE OF INSULIN (H): ICD-10-CM

## 2019-04-25 DIAGNOSIS — R06.09 DYSPNEA ON EXERTION: ICD-10-CM

## 2019-04-25 DIAGNOSIS — I10 ESSENTIAL HYPERTENSION, BENIGN: ICD-10-CM

## 2019-04-25 DIAGNOSIS — N18.30 CKD (CHRONIC KIDNEY DISEASE) STAGE 3, GFR 30-59 ML/MIN (H): Primary | ICD-10-CM

## 2019-04-25 DIAGNOSIS — E03.9 HYPOTHYROIDISM, UNSPECIFIED TYPE: ICD-10-CM

## 2019-04-25 LAB
ALBUMIN SERPL-MCNC: 3.7 G/DL (ref 3.4–5)
ALP SERPL-CCNC: 187 U/L (ref 40–150)
ALT SERPL W P-5'-P-CCNC: 45 U/L (ref 0–70)
ANION GAP SERPL CALCULATED.3IONS-SCNC: 5 MMOL/L (ref 3–14)
AST SERPL W P-5'-P-CCNC: 20 U/L (ref 0–45)
BILIRUB SERPL-MCNC: 1.7 MG/DL (ref 0.2–1.3)
BUN SERPL-MCNC: 14 MG/DL (ref 7–30)
CALCIUM SERPL-MCNC: 9.8 MG/DL (ref 8.5–10.1)
CHLORIDE SERPL-SCNC: 105 MMOL/L (ref 94–109)
CO2 SERPL-SCNC: 30 MMOL/L (ref 20–32)
CREAT SERPL-MCNC: 1.29 MG/DL (ref 0.66–1.25)
GFR SERPL CREATININE-BSD FRML MDRD: 53 ML/MIN/{1.73_M2}
GLUCOSE SERPL-MCNC: 189 MG/DL (ref 70–99)
POTASSIUM SERPL-SCNC: 4.2 MMOL/L (ref 3.4–5.3)
PROT SERPL-MCNC: 6.8 G/DL (ref 6.8–8.8)
SODIUM SERPL-SCNC: 140 MMOL/L (ref 133–144)
T4 FREE SERPL-MCNC: 1.08 NG/DL (ref 0.76–1.46)
TSH SERPL DL<=0.005 MIU/L-ACNC: 5.52 MU/L (ref 0.4–4)

## 2019-04-25 PROCEDURE — 99214 OFFICE O/P EST MOD 30 MIN: CPT | Performed by: INTERNAL MEDICINE

## 2019-04-25 PROCEDURE — 80053 COMPREHEN METABOLIC PANEL: CPT | Performed by: INTERNAL MEDICINE

## 2019-04-25 PROCEDURE — 93306 TTE W/DOPPLER COMPLETE: CPT

## 2019-04-25 PROCEDURE — 84443 ASSAY THYROID STIM HORMONE: CPT | Performed by: INTERNAL MEDICINE

## 2019-04-25 PROCEDURE — 93306 TTE W/DOPPLER COMPLETE: CPT | Mod: 26 | Performed by: INTERNAL MEDICINE

## 2019-04-25 PROCEDURE — 84439 ASSAY OF FREE THYROXINE: CPT | Performed by: INTERNAL MEDICINE

## 2019-04-25 PROCEDURE — 36415 COLL VENOUS BLD VENIPUNCTURE: CPT | Performed by: INTERNAL MEDICINE

## 2019-04-25 NOTE — PROGRESS NOTES
"  SUBJECTIVE:   Pascual Perea is a 78 year old male who presents to clinic today for the following   health issues:  Chief Complaint   Patient presents with     Follow Up     for OV on 03/22/2019      Pt's past medical history, family history, habits, medications and allergies were reviewed with the patient today.  See snap shot for  HCM status. Most recent lab results reviewed with pt. Problem list and histories reviewed & adjusted, as indicated.  Additional history as below:    Weight down 18 pounds with use of furosemide.  Still has small amount of lower extremity edema.  Denies orthopnea or PND.  Feels abdomen is softer also.  Denies abdominal pain now.  Appetite okay.  Will be leaving this Saturday to [place] and then [place] to attend a wedding.  Will be back on May 11.  Renal function remains abnormal.  Liver function tests have normalized.  Recently restarted lisinopril and pravastatin as per cardiology note.  Stop gemfibrozil.  Blood sugars stable with insulin therapy while off of metformin still.     Additional ROS:   Constitutional, HEENT, Cardiovascular, Pulmonary, GI and , Neuro, MSK and Psych review of systems/symptoms are otherwise negative or unchanged from previous, except as noted above.      OBJECTIVE:  /82   Pulse 64   Temp 97.8  F (36.6  C) (Oral)   Resp 16   Wt 92.1 kg (203 lb)   SpO2 99%   BMI 28.31 kg/m     Estimated body mass index is 28.31 kg/m  as calculated from the following:    Height as of 4/19/19: 1.803 m (5' 11\").    Weight as of this encounter: 92.1 kg (203 lb).     Neck: no adenopathy. Thyroid normal to palpation. No bruits. NO JVD  Pulm: Lungs clear to auscultation   CV: Regular rates and rhythm  GI: Soft, nontender, Normal active bowel sounds, No hepatosplenomegaly or masses palpable  Ext: Peripheral pulses intact. 1 plus BLE edema.  Neuro: Normal strength and tone, sensory exam grossly normal    Assessment/Plan: (See plan discussion below for further " details)  1. CKD (chronic kidney disease) stage 3, GFR 30-59 ml/min (H)  Off of furosemide currently.  Needs lab recheck to see if OK to restart  - Comprehensive metabolic panel     2. Hypothyroidism, unspecified type  On levothyroxine.  At risk for thyroid function change with current use of amiodarone.  Recheck lab results  - TSH with free T4 reflex  - T4 free     3. Edema, unspecified type  Weight down 18 pounds with previous diuretic therapy but still mild edema present.  Await lab results as above regarding possibly restarting diuretic therapy.  Since patient will be leaving out of town in a few days and gone for 3 weeks, hesitant to change diuretic from furosemide to torsemide at this time though will consider once back in town and can do closer lab monitoring.  If renal function stable, will restart furosemide for now    4. Type 2 diabetes mellitus without complication, with long-term current use of insulin (H)  Off of metformin currently with previous recent renal functions.  Recheck lab today.  Continue insulin therapy for now    5. Essential hypertension, benign  Blood pressure mildly above goal.  Lisinopril just recently restarted.  Should improve if able to restart diuretic therapy also.  Could consider future amlodipine but may worsen lower extremity edema so will recheck in mid May when back in town and defer additional blood pressure treatment for now besides potentially re-adding furosemide as above    Plan discussion:   CMP and TSH lab today   Contact clinic when back from [place] 5/11/19   Probable restart Furosemide daily for leg swelling  in addition for blood pressure control.  Will await lab results  Remain off of Gemfibrizol and Metformin for now  Wear compression stockings the day of flying and then day after. Put on  In AM and off later PM  Continue other medications  Maintain low sodium intake    Andrew Elizalde MD  Internal Medicine Department  [facility]

## 2019-04-25 NOTE — PATIENT INSTRUCTIONS
CMP and TSH lab today   Contact clinic when back from [place] 5/11/19   Probable restart Furosemide daily for leg swelling and starting Amlodipine  in addition for blood pressure control. Will have my nurse call over lunch hour tomorrow  Remain off of Gemfibrizol and Metformin  Wear compression stockings the day of flying and then day after. Put on  In AM and off later PM

## 2019-04-28 ENCOUNTER — MYC MEDICAL ADVICE (OUTPATIENT)
Dept: INTERNAL MEDICINE | Facility: CLINIC | Age: 79
End: 2019-04-28

## 2019-04-28 VITALS
SYSTOLIC BLOOD PRESSURE: 146 MMHG | BODY MASS INDEX: 28.31 KG/M2 | DIASTOLIC BLOOD PRESSURE: 82 MMHG | TEMPERATURE: 97.8 F | HEART RATE: 64 BPM | OXYGEN SATURATION: 99 % | WEIGHT: 203 LBS | RESPIRATION RATE: 16 BRPM

## 2019-04-28 DIAGNOSIS — R60.9 EDEMA, UNSPECIFIED TYPE: Primary | ICD-10-CM

## 2019-04-29 NOTE — TELEPHONE ENCOUNTER
Pharmacy pending. Is this in regards to possibly restarting furosemide and starting amlodipine per last office visit notes? Do not see any advisement from recent labs. Thyroid levels also off.

## 2019-04-30 RX ORDER — FUROSEMIDE 20 MG
20 TABLET ORAL DAILY
Qty: 30 TABLET | Refills: 3 | Status: SHIPPED | OUTPATIENT
Start: 2019-04-30 | End: 2019-08-27

## 2019-05-09 DIAGNOSIS — Z12.5 SCREENING FOR MALIGNANT NEOPLASM OF PROSTATE: Primary | ICD-10-CM

## 2019-05-09 DIAGNOSIS — E34.9 TESTOSTERONE DEFICIENCY: ICD-10-CM

## 2019-05-13 ENCOUNTER — OFFICE VISIT (OUTPATIENT)
Dept: CARDIOLOGY | Facility: CLINIC | Age: 79
End: 2019-05-13
Payer: COMMERCIAL

## 2019-05-13 VITALS
HEART RATE: 73 BPM | BODY MASS INDEX: 27.9 KG/M2 | DIASTOLIC BLOOD PRESSURE: 72 MMHG | SYSTOLIC BLOOD PRESSURE: 142 MMHG | WEIGHT: 199.3 LBS | HEIGHT: 71 IN

## 2019-05-13 DIAGNOSIS — I48.91 ATRIAL FIBRILLATION, UNSPECIFIED TYPE (H): ICD-10-CM

## 2019-05-13 DIAGNOSIS — I48.19 PERSISTENT ATRIAL FIBRILLATION (H): Primary | ICD-10-CM

## 2019-05-13 DIAGNOSIS — I10 BENIGN ESSENTIAL HYPERTENSION: ICD-10-CM

## 2019-05-13 DIAGNOSIS — E78.5 HYPERLIPIDEMIA LDL GOAL <70: ICD-10-CM

## 2019-05-13 LAB
ANION GAP SERPL CALCULATED.3IONS-SCNC: 11.7 MMOL/L (ref 6–17)
BUN SERPL-MCNC: 12 MG/DL (ref 7–30)
CALCIUM SERPL-MCNC: 10.5 MG/DL (ref 8.5–10.5)
CHLORIDE SERPL-SCNC: 100 MMOL/L (ref 98–107)
CO2 SERPL-SCNC: 30 MMOL/L (ref 23–29)
CREAT SERPL-MCNC: 1.24 MG/DL (ref 0.7–1.3)
GFR SERPL CREATININE-BSD FRML MDRD: 57 ML/MIN/{1.73_M2}
GLUCOSE SERPL-MCNC: 180 MG/DL (ref 70–105)
POTASSIUM SERPL-SCNC: 3.7 MMOL/L (ref 3.5–5.1)
SODIUM SERPL-SCNC: 138 MMOL/L (ref 136–145)

## 2019-05-13 PROCEDURE — 36415 COLL VENOUS BLD VENIPUNCTURE: CPT | Performed by: PHYSICIAN ASSISTANT

## 2019-05-13 PROCEDURE — 99214 OFFICE O/P EST MOD 30 MIN: CPT | Performed by: PHYSICIAN ASSISTANT

## 2019-05-13 PROCEDURE — 80048 BASIC METABOLIC PNL TOTAL CA: CPT | Performed by: PHYSICIAN ASSISTANT

## 2019-05-13 PROCEDURE — 93000 ELECTROCARDIOGRAM COMPLETE: CPT | Performed by: PHYSICIAN ASSISTANT

## 2019-05-13 RX ORDER — AMIODARONE HYDROCHLORIDE 200 MG/1
100 TABLET ORAL EVERY MORNING
Start: 2019-05-13 | End: 2019-11-19

## 2019-05-13 ASSESSMENT — MIFFLIN-ST. JEOR: SCORE: 1651.15

## 2019-05-13 NOTE — PROGRESS NOTES
HPI:   I had the pleasure of seeing Dayday when he and Kina came for follow up of atrial fibrillation.  He is a 78 year old who sees Dr. Jarvis for his history of:     1. Persistent Atrial Fibrillation first diagnosed 5/2016.  He underwent DC cardioversion, but had recurrent atrial fibrillation 4/2018 requiring DC cardioversion.  He saw Dr. Jarvis 1/2019 at which time he was back in A. fib.  He was initiated on a loading dose of amiodarone, and underwent repeat DC cardioversion 2/13/2019.  He has remained on amiodarone and anticoagulation, though this was briefly interrupted 3/2019 for cholecystectomy (see below). Dr. Jarvis noted we could proceed with PVI vs DCCV.  He has opted to proceed with DC cardioversion as the last one had worked until his cholecystectomy.  2.  Recent cholecystectomy. Hospitalized 3/13-3/18/2019 for vomiting and abdominal discomfort.  He was diagnosed with an acute cholecystitis, and underwent lap cholecystectomy 3/17/2019.  His Eliquis was held in preparation for that surgery, and resumed on 3/18/2019.  During his hospitalization, there was question of atrial arrhythmias but no EKG was done.  3.  Benign essential hypertension  4.  Fluid overload and ascites for which he saw Dr. Elizalde 3/22.  At that time, he was 205 pounds.  Lasix 20 mg daily was started. This was held but has been restarted and he states his edema is much improved  5.  Coronary artery disease He underwent bypass surgery in 1997.  Last stress test 4/2018 showed a small area of inferior ischemia.  Echocardiogram 4/2018 showed a preserved ejection fraction.    I saw Dayday after his vacation, and he tells me that he is feeling quite a bit better than when he saw me last time.  He thinks he is fully healed from his cholecystectomy.  He is back on Lasix 20 mg daily, and thinks that that has been a good dose to keep fluid off.  We did talk about switching him to torsemide given his preserved ejection fraction and mildly decreased RV  "function, but Dr. Elizalde prudently recommended waiting until he was back from vacation before switching diuretics.    Overall, Dayday is feeling well.  He denies palpitations, but still notes that he is \"not normal\" as he is in atrial fibrillation.  He has not had edema, orthopnea or PND.  He thinks his breathing is much improved.  He denies palpitations, lightheadedness or dizziness.  He denies chest pain, pressure or tightness.    EKG today, which I over read, showed atrial fibrillation at 59 bpm.  He continued to demonstrate LVH with strain pattern.  Septal and lateral T wave inversions.   Echocardiogram 4/25/2019 EF 55% with mildly decreased right ventricular systolic function.  Mild aortic stenosis with a mean gradient of 9 mmHg and an aortic valve area 1.7 cm .  1+ mitral regurgitation was noted.  Left atrium was dilated at 4.6 cm with a volume index of 51.8 mL/m .  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      Assessment & Plan:    1.  Persistent atrial fibrillation    We discussed try to get him back into sinus rhythm, as he did feel better when in sinus.  He had done well after his cardioversion 2/2019, but went back in A. fib when he was in for cholecystitis    He has continued Eliquis without interruption since his cholecystectomy    Heart rate is a bit slow today on amiodarone 200.  He was loaded back in January    PLAN:    Decrease amiodarone to 100 mg daily    Continue Eliquis 5 mg twice daily without interruption    Plan for DC cardioversion and follow-up afterwards.  We discussed Risk, Benefits and Indication of proceeding with direct current cardioversion (DCCV), including but not limited to use of anesthesia, surface burns, [id number] arrhythmias requiring further treatment, discomfort and stroke.  The patient voiced understanding and understands that a  will be needed given the use of conscious sedation. A consent form will be signed by the procedural physician.      If he remains on amiodarone, " will require amiodarone testing in 6 months (labs were done 4/2019)      2. HFpEF     He saw Dr. Elizalde and noted scrotal edema.  He has not been on Lasix, and we discussed switching him to torsemide based on his decreased RV function and normal EF, but he is actually doing well    BMP, as above, is okay    PLAN:    Would continue Lasix therapy.  If fluid overload becomes an issue, would certainly switch him to torsemide given his preserved ejection fraction    It is possible that restoring sinus rhythm improve fluid overload as well.    3.  Benign essential hypertension    Blood pressure was elevated when he first came in, but did not improve.  He has been back on lisinopril 30 mg since I saw him 4/19 (it previously been stopped due to renal insufficiency following cholecystectomy).    PLAN:    Consider lisinopril increased to 40 mg daily versus initiation of amlodipine, noting that this may worsen his lower extremity edema of it.    Would avoid beta-blocker/AV taylor blocking agents given his slow heart rate response on amiodarone 200 mg daily      Joleen Marrero PA-C, MSPAS      Orders Placed This Encounter   Procedures     Follow-Up with Cardiac Advanced Practice Provider     EKG 12-lead complete w/read - Clinics (performed today)     EKG 12-lead complete w/read - Clinics (to be scheduled)     Cardioversion     Orders Placed This Encounter   Medications     amiodarone (PACERONE/CODARONE) 200 MG tablet     Sig: Take 0.5 tablets (100 mg) by mouth every morning     Medications Discontinued During This Encounter   Medication Reason     amiodarone (PACERONE/CODARONE) 200 MG tablet          Encounter Diagnoses   Name Primary?     Persistent atrial fibrillation (H) Yes     Benign essential hypertension      Hyperlipidemia LDL goal <70      Atrial fibrillation, unspecified type (H)        CURRENT MEDICATIONS:  Current Outpatient Medications   Medication Sig Dispense Refill     amiodarone (PACERONE/CODARONE) 200 MG tablet Take  "0.5 tablets (100 mg) by mouth every morning       apixaban ANTICOAGULANT (ELIQUIS) 5 MG tablet Take 1 tablet (5 mg) by mouth 2 times daily 60 tablet 11     B-D LUER-FELICITAS SYRINGE 22G X 1-1/2\" 3 ML MISC USE WITH TESTOSTERONE EVERY 2 WEEKS AS DIRECTED 30 each 1     blood glucose (NO BRAND SPECIFIED) test strip Use to test blood sugar 3 times daily or as directed. 300 strip 3     blood glucose calibration (NO BRAND SPECIFIED) solution Use as directed 1 Bottle 11     blood glucose monitoring (NO BRAND SPECIFIED) meter device kit Use to test blood sugar 3 times daily or as directed. 1 kit 0     Cholecalciferol (VITAMIN D3 PO) Take 5,000 Units by mouth daily       fexofenadine (ALLEGRA) 180 MG tablet Take 1 tablet (180 mg) by mouth daily 90 tablet 3     fluticasone (FLONASE) 50 MCG/ACT spray USE ONE TO TWO SPRAYS IN EACH NOSTRIL EVERY DAY 48 g 3     furosemide (LASIX) 20 MG tablet Take 1 tablet (20 mg) by mouth daily 30 tablet 3     GLUCOSE 4 GM OR CHEW as directed 30 11     insulin aspart (NOVOLOG FLEXPEN) 100 UNIT/ML pen Inject 3 times daily (before meals). 2 units per carb choice (15gm). Approx 5-10 units/meal. 30 mL 3     insulin detemir (LEVEMIR FLEXPEN/FLEXTOUCH) 100 UNIT/ML pen Inject 35-40 Units Subcutaneous At Bedtime (Patient taking differently: Inject 35 Units Subcutaneous At Bedtime ) 45 mL 3     levothyroxine (SYNTHROID/LEVOTHROID) 125 MCG tablet TAKE ONE TABLET BY MOUTH EVERY DAY 90 tablet 1     lisinopril (PRINIVIL/ZESTRIL) 30 MG tablet Take 1 tablet (30 mg) by mouth daily 90 tablet 3     nitroglycerin (NITROSTAT) 0.4 MG SL tablet Place 1 tablet (0.4 mg) under the tongue every 5 minutes as needed for chest pain 25 tablet 1     NOVOFINE 30 30G X 8 MM insulin pen needle USE FOUR TIMES DAILY. PATIENT NEEDS OFFICE VISIT 400 each 1     order for DME Equipment being ordered: 3ml syringes and 22G 1 1/2\" needles to use with testosterone inj every 2 weeks 30 Device 1     pravastatin (PRAVACHOL) 80 MG tablet Take 1 " tablet (80 mg) by mouth At Bedtime       ranitidine (ZANTAC) 150 MG tablet TAKE ONE TABLET BY MOUTH TWICE A  tablet 2     testosterone cypionate (DEPOTESTOSTERONE) 200 MG/ML injection Inject 1.5 mLs (300 mg) into the muscle every 14 days 10 mL 1     thin (NO BRAND SPECIFIED) lancets Use to check sugars  each 3       ALLERGIES     Allergies   Allergen Reactions     Omeprazole      diarrhea       PAST MEDICAL HISTORY:  Past Medical History:   Diagnosis Date     Adhesive capsulitis of shoulder      Anemia 3/10/2014     Anemia, unspecified      Antiplatelet or antithrombotic long-term use      Arrhythmia     Atrial fib/flutter     CAD (coronary artery disease)      History of cardioversion 04/24/2018    a single synchronized shock of 120 joules restored normal sinus rhythm     History of cardioversion 02/13/2019    single shock , 200 joules successul in restoring NSR     Hyperlipidemia LDL goal <70 5/26/2011     Hypertension      Hypothyroidism      Impotence of organic origin      Laceration of finger  June 2010     left thumb s/p sutures     Numbness and tingling     diabetic neuropathy bilateral feet     BRANDON (obstructive sleep apnea)     intolerant of CPAP, uses it occasionally.  Using mandibular device occasionally     Osteopenia      Pulmonary hypertension (H) 4/6/2012     Sensorineural hearing loss, unspecified      Stented coronary artery 1997    CABG      Testosterone deficiency      Type 2 diabetes mellitus without complication  (goal A1C<8) 10/24/2015     Typical atrial flutter (H) 4/18/16     Unspecified hypothyroidism      Vitamin D deficiency 9/3/2011       PAST SURGICAL HISTORY:  Past Surgical History:   Procedure Laterality Date     ANESTHESIA CARDIOVERSION N/A 2/13/2019    Procedure: ANESTHESIA CARDIOVERSION;  Surgeon: GENERIC ANESTHESIA PROVIDER;  Location: [facility] OR     C NONSPECIFIC PROCEDURE  1995    CABG (Buddhism)     C NONSPECIFIC PROCEDURE  8/01    stress echo     CARDIAC SURGERY       bypass in 1997     CARDIOVERSION  04/24/2018     COLONOSCOPY       CORONARY ANGIOGRAPHY ADULT ORDER      4/2/2012     CORONARY ARTERY BYPASS  1997    [facility]     ENDOSCOPIC ULTRASOUND UPPER GASTROINTESTINAL TRACT (GI) N/A 3/14/2019    Procedure: ENDOSCOPIC ULTRASOUND OF UPPER GASTROINTESTINAL TRACT;  Surgeon: Ju Torres MD;  Location:  OR     EXCISE MASS HEAD Left 8/18/2016    Procedure: EXCISE MASS HEAD;  Surgeon: Ivan Hernandez MD;  Location: [facility]     HEART CATH, ANGIOPLASTY  4/2012     LAPAROSCOPIC CHOLECYSTECTOMY N/A 3/17/2019    Procedure: LAPAROSCOPIC CHOLECYSTECTOMY;  Surgeon: Janel Paz MD;  Location:  OR     PHACOEMULSIFICATION CLEAR CORNEA WITH STANDARD INTRAOCULAR LENS IMPLANT Left 6/8/2015    Procedure: PHACOEMULSIFICATION CLEAR CORNEA WITH STANDARD INTRAOCULAR LENS IMPLANT;  Surgeon: Nixon Farnsworth MD;  Location:  [facility]     PHACOEMULSIFICATION CLEAR CORNEA WITH STANDARD INTRAOCULAR LENS IMPLANT Right 6/22/2015    Procedure: PHACOEMULSIFICATION CLEAR CORNEA WITH STANDARD INTRAOCULAR LENS IMPLANT;  Surgeon: Nixon Farnsworth MD;  Location:  [facility]     SEPTOPLASTY, TURBINOPLASTY, COMBINED Bilateral 8/18/2016    Procedure: COMBINED SEPTOPLASTY, TURBINOPLASTY;  Surgeon: Ivan Hernandez MD;  Location: [facility]       FAMILY HISTORY:  Family History   Problem Relation Age of Onset     Genitourinary Problems Father         d: age 89; ARF     Hypertension Father      Diabetes Mother         d: age 68, CAD     Heart Disease Mother         MI     Myocardial Infarction Mother      Cardiovascular Brother         d:  age 31; CAD     Heart Disease Brother         age 31, MI     Diabetes Sister         b:  1939; DM2       SOCIAL HISTORY:  Social History     Socioeconomic History     Marital status:      Spouse name: None     Number of children: None     Years of education: None     Highest education level: None   Occupational History     None    Social Needs     Financial resource strain: None     Food insecurity:     Worry: None     Inability: None     Transportation needs:     Medical: None     Non-medical: None   Tobacco Use     Smoking status: Former Smoker     Packs/day: 1.00     Years: 6.00     Pack years: 6.00     Start date:      Last attempt to quit: 1964     Years since quittin.9     Smokeless tobacco: Never Used   Substance and Sexual Activity     Alcohol use: Yes     Comment: couple drinks a month     Drug use: No     Sexual activity: Yes     Partners: Female   Lifestyle     Physical activity:     Days per week: None     Minutes per session: None     Stress: None   Relationships     Social connections:     Talks on phone: None     Gets together: None     Attends [employer] service: None     Active member of club or organization: None     Attends meetings of clubs or organizations: None     Relationship status: None     Intimate partner violence:     Fear of current or ex partner: None     Emotionally abused: None     Physically abused: None     Forced sexual activity: None   Other Topics Concern     Parent/sibling w/ CABG, MI or angioplasty before 65F 55M? Not Asked      Service Not Asked     Blood Transfusions Not Asked     Caffeine Concern Yes     Comment: 2 cups coffee a day     Occupational Exposure Not Asked     Hobby Hazards Not Asked     Sleep Concern Yes     Comment: sleep apnea, wears cpap off and on     Stress Concern No     Weight Concern No     Special Diet Yes     Comment: diabetic diet      Back Care Not Asked     Exercise No     Comment: occ walk the mall     Bike Helmet Not Asked     Seat Belt Yes     Self-Exams Not Asked   Social History Narrative     None       Review of Systems:  Skin:  Negative     Eyes:  Positive for glasses  ENT:  Positive for nasal congestion  Respiratory:  Positive for sleep apnea  Cardiovascular:  Negative for;palpitations;chest pain;dizziness;lightheadedness;edema fatigue;Positive  "for;exercise intolerance  Gastroenterology: Positive for    Genitourinary:  Negative    Musculoskeletal:  Positive for joint pain  Neurologic:  Positive for numbness or tingling of feet  Psychiatric:  Negative    Heme/Lymph/Imm:  Negative    Endocrine:  Positive for diabetes;thyroid disorder;night sweats    Physical Exam:  Vitals: /72   Pulse 73   Ht 1.803 m (5' 11\")   Wt 90.4 kg (199 lb 4.8 oz)   BMI 27.80 kg/m      Constitutional:  cooperative, alert and oriented, well developed, well nourished, in no acute distress        Skin:  warm and dry to the touch        Head:  normocephalic, no masses or lesions        Eyes:  pupils equal and round;sclera white;conjunctivae and lids unremarkable        ENT:  no pallor or cyanosis, dentition good        Neck:           Chest:  normal breath sounds, clear to auscultation, normal A-P diameter, normal symmetry, normal respiratory excursion, no use of accessory muscles        Cardiac: normal S1 and S2 irregularly irregular rhythm     systolic ejection murmur          Abdomen:  BS normoactive obese Firm belly. BS present.     Vascular: pulses full and equal                                      Extremities and Back:  no deformities, clubbing, cyanosis, erythema observed        Neurological:  no gross motor deficits          Recent Lab Results:  LIPID RESULTS:  Lab Results   Component Value Date    CHOL 105 08/13/2018    HDL 32 (L) 08/13/2018    LDL 38 08/13/2018    TRIG 175 (H) 08/13/2018    CHOLHDLRATIO 3.3 05/08/2015       LIVER ENZYME RESULTS:  Lab Results   Component Value Date    AST 20 04/25/2019    ALT 45 04/25/2019       CBC RESULTS:  Lab Results   Component Value Date    WBC 6.5 03/22/2019    RBC 5.12 03/22/2019    HGB 15.5 04/19/2019    HCT 44.4 03/22/2019    MCV 87 03/22/2019    MCH 28.3 03/22/2019    MCHC 32.7 03/22/2019    RDW 16.2 (H) 03/22/2019     03/22/2019       BMP RESULTS:  Lab Results   Component Value Date     05/13/2019    POTASSIUM " 3.7 05/13/2019    CHLORIDE 100 05/13/2019    CO2 30 (H) 05/13/2019    ANIONGAP 11.7 05/13/2019     (H) 05/13/2019    BUN 12 05/13/2019    CR 1.24 05/13/2019    GFRESTIMATED 57 (L) 05/13/2019    GFRESTBLACK 68 05/13/2019    STARR 10.5 05/13/2019        A1C RESULTS:  Lab Results   Component Value Date    A1C 7.4 (H) 03/14/2019       INR RESULTS:  Lab Results   Component Value Date    INR 1.09 03/17/2019    INR 1.37 (H) 03/14/2019

## 2019-05-13 NOTE — PATIENT INSTRUCTIONS
1. Kidney function today looked good on furosemide 20 mg daily.   Dr. Elizalde may not need to switch water pills when you see him next.      2. BP was very high when it was first checked but I rechecked it and it was much better    3. EKG today showed atrial fibrillation with heart rate ~60 bpm. As discussed, will plan cardioversion to get you back into normal rhythm. It had worked well before you gall bladder attack!    * Continue amiodarone. Decrease to 100 mg daily   * Continue Eliquis 5 mg twice a day. DO NOT STOP!     4. See me back 1 week after cardioversion. CALL if issues prior! [phone] (Tatiana Chin and Meri)

## 2019-05-13 NOTE — LETTER
5/13/2019    Andrew Elizalde MD  [address] [facility] [address]    RE: Pascual Perea       Dear Colleague,    I had the pleasure of seeing Pascual ALEXANDRA Eliazar in the [facility].    HPI:   I had the pleasure of seeing Dayday when he and Kina came for follow up of atrial fibrillation.  He is a 78 year old who sees Dr. Jarvis for his history of:     1. Persistent Atrial Fibrillation first diagnosed 5/2016.  He underwent DC cardioversion, but had recurrent atrial fibrillation 4/2018 requiring DC cardioversion.  He saw Dr. Jarvis 1/2019 at which time he was back in [facility].  He was initiated on a loading dose of amiodarone, and underwent repeat DC cardioversion 2/13/2019.  He has remained on amiodarone and anticoagulation, though this was briefly interrupted 3/2019 for cholecystectomy (see below). Dr. Jarvis noted we could proceed with PVI vs DCCV.  He has opted to proceed with DC cardioversion as the last one had worked until his cholecystectomy.  2.  Recent  cholecystectomy. Hospitalized 3/13-3/18/2019 for vomiting and abdominal discomfort.  He was diagnosed with an acute cholecystitis, and underwent lap cholecystectomy 3/17/2019.  His Eliquis was held in preparation for that surgery, and resumed on 3/18/2019.  During his hospitalization, there was question of atrial arrhythmias but no EKG was done.  3.  Benign essential hypertension  4.  Fluid overload and ascites for which he saw Dr. Elizalde 3/22.  At that time, he was 205 pounds.  Lasix 20 mg daily was started. This was held but has been restarted and he states his edema is much improved  5.  Coronary artery disease He underwent bypass surgery in 1997.  Last stress test 4/2018 showed a small area of inferior ischemia.  Echocardiogram 4/2018 showed a preserved ejection fraction.    I saw Dayday after his vacation, and he tells me that he is feeling quite a bit better than when he saw me last time.  He thinks he is fully healed from his  "cholecystectomy.  He is back on Lasix 20 mg daily, and thinks that that has been a good dose to keep fluid off.  We did talk about switching him to torsemide given his preserved ejection fraction and mildly decreased RV function, but Dr. Elizalde prudently recommended waiting until he was back from vacation before switching diuretics.    Overall, Dayday is feeling well.  He denies palpitations, but still notes that he is \"not normal\" as he is in atrial fibrillation.  He has not had edema, orthopnea or PND.  He thinks his breathing is much improved.  He denies palpitations, lightheadedness or dizziness.  He denies chest pain, pressure or tightness.    EKG today, which I over read, showed atrial fibrillation at 59 bpm.  He continued to demonstrate LVH with strain pattern.  Septal and lateral T wave inversions.   Echocardiogram 4/ 25/2019 EF 55% with mildly decreased right ventricular systolic function.  Mild aortic stenosis with a mean gradient of 9 mmHg and an aortic valve area 1.7 cm .  1+ mitral regurgitation was noted.  Left atrium was dilated at 4.6 cm with a volume index of 51.8 mL/m .  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      Assessment & Plan:    1.  Persistent atrial fibrillation    We discussed try to get him back into sinus rhythm, as he did feel better when in sinus.  He had done well after his cardioversion 2/2019, but went back in A. fib when he was in for cholecystitis    He has continued Eliquis without interruption since his cholecystectomy    Heart rate is a bit slow today on amiodarone 200.  He was loaded back in January    PLAN:    Decrease amiodarone to 100 mg daily    Continue Eliquis 5 mg twice daily without interruption    Plan for DC cardioversion and follow-up afterwards.  We discussed Risk, Benefits and Indication of proceeding with direct current cardioversion (DCCV), including but not limited to use of anesthesia, surface burns, [id number] arrhythmias requiring further treatment, discomfort and " stroke.  The patient voiced understanding and understands that a  will be needed given the use of conscious sedation. A consent form will be signed by the procedural physician.      If he remains on amiodarone, will require amiodarone testing in 6 months (labs were done 4/2019)      2. HFpEF     He saw Dr. Elizalde and noted scrotal edema.  He has not been on Lasix, and we discussed switching him to torsemide based on his decreased RV function and normal EF, but he is actually doing well    BMP, as above, is okay    PLAN:    Would continue Lasix therapy.  If fluid overload becomes an issue, would certainly switch him to torsemide given his preserved ejection fraction    It is possible that restoring sinus rhythm improve fluid overload as well.    3.  Benign essential hypertension    Blood pressure was elevated when he first came in, but did not improve.  He has been back on lisinopril 30 mg since I saw him 4/19 (it previously been stopped due to renal insufficiency following cholecystectomy).    PLAN:    Consider lisinopril increased to 40 mg daily versus initiation of amlodipine, noting that this may worsen his lower extremity edema of it.    Would avoid beta-blocker/AV taylor blocking agents given his slow heart rate response on amiodarone 200 mg daily      Joleen Marrero PA-C, MSPAS      Orders Placed This Encounter   Procedures     Follow-Up with Cardiac Advanced Practice Provider     EKG 12-lead complete w/read - Clinics (performed today)     EKG 12-lead complete w/read - Clinics (to be scheduled)     Cardioversion     Orders Placed This Encounter   Medications     amiodarone (PACERONE/CODARONE) 200 MG tablet     Sig: Take 0.5 tablets (100 mg) by mouth every morning     Medications Discontinued During This Encounter   Medication Reason     amiodarone (PACERONE/CODARONE) 200 MG tablet          Encounter Diagnoses   Name Primary?     Persistent atrial fibrillation (H) Yes     Benign essential hypertension       "Hyperlipidemia LDL goal <70      Atrial fibrillation, unspecified type (H)        CURRENT MEDICATIONS:  Current Outpatient Medications   Medication Sig Dispense Refill     amiodarone (PACERONE/CODARONE) 200 MG tablet Take 0.5 tablets (100 mg) by mouth every morning       apixaban ANTICOAGULANT (ELIQUIS) 5 MG tablet Take 1 tablet (5 mg) by mouth 2 times daily 60 tablet 11     B-D LUER-FELICITAS SYRINGE 22G X 1-1/2\" 3 ML MISC USE WITH TESTOSTERONE EVERY 2 WEEKS AS DIRECTED 30 each 1     blood glucose (NO BRAND SPECIFIED) test strip Use to test blood sugar 3 times daily or as directed. 300 strip 3     blood glucose calibration (NO BRAND SPECIFIED) solution Use as directed 1 Bottle 11     blood glucose monitoring (NO BRAND SPECIFIED) meter device kit Use to test blood sugar 3 times daily or as directed. 1 kit 0     Cholecalciferol (VITAMIN D3 PO) Take 5,000 Units by mouth daily       fexofenadine (ALLEGRA) 180 MG tablet Take 1 tablet (180 mg) by mouth daily 90 tablet 3     fluticasone (FLONASE) 50 MCG/ACT spray USE ONE TO TWO SPRAYS IN EACH NOSTRIL EVERY DAY 48 g 3     furosemide (LASIX) 20 MG tablet Take 1 tablet (20 mg) by mouth daily 30 tablet 3     GLUCOSE 4 GM OR CHEW as directed 30 11     insulin aspart (NOVOLOG FLEXPEN) 100 UNIT/ML pen Inject 3 times daily (before meals). 2 units per carb choice (15gm). Approx 5-10 units/meal. 30 mL 3     insulin detemir (LEVEMIR FLEXPEN/FLEXTOUCH) 100 UNIT/ML pen Inject 35-40 Units Subcutaneous At Bedtime (Patient taking differently: Inject 35 Units Subcutaneous At Bedtime ) 45 mL 3     levothyroxine (SYNTHROID/LEVOTHROID) 125 MCG tablet TAKE ONE TABLET BY MOUTH EVERY DAY 90 tablet 1     lisinopril (PRINIVIL/ZESTRIL) 30 MG tablet Take 1 tablet (30 mg) by mouth daily 90 tablet 3     nitroglycerin (NITROSTAT) 0.4 MG SL tablet Place 1 tablet (0.4 mg) under the tongue every 5 minutes as needed for chest pain 25 tablet 1     NOVOFINE 30 30G X 8 MM insulin pen needle USE FOUR TIMES DAILY. " "PATIENT NEEDS OFFICE VISIT 400 each 1     order for DME Equipment being ordered: 3ml syringes and 22G 1 1/2\" needles to use with testosterone inj every 2 weeks 30 Device 1     pravastatin (PRAVACHOL) 80 MG tablet Take 1 tablet (80 mg) by mouth At Bedtime       ranitidine (ZANTAC) 150 MG tablet TAKE ONE TABLET BY MOUTH TWICE A  tablet 2     testosterone cypionate (DEPOTESTOSTERONE) 200 MG/ML injection Inject 1.5 mLs (300 mg) into the muscle every 14 days 10 mL 1     thin (NO BRAND SPECIFIED) lancets Use to check sugars  each 3       ALLERGIES     Allergies   Allergen Reactions     Omeprazole      diarrhea       PAST MEDICAL HISTORY:  Past Medical History:   Diagnosis Date     Adhesive capsulitis of shoulder      Anemia 3/10/2014     Anemia, unspecified      Antiplatelet or antithrombotic long-term use      Arrhythmia     Atrial fib/flutter     CAD (coronary artery disease)      History of cardioversion 04/24/2018    a single synchronized shock of 120 joules restored normal sinus rhythm     History of cardioversion 02/13/2019    single shock , 200 joules successul in restoring NSR     Hyperlipidemia LDL goal <70 5/26/2011     Hypertension      Hypothyroidism      Impotence of organic origin      Laceration of finger  June 2010     left thumb s/p sutures     Numbness and tingling     diabetic neuropathy bilateral feet     BRANDON (obstructive sleep apnea)     intolerant of CPAP, uses it occasionally.  Using mandibular device occasionally     Osteopenia      Pulmonary hypertension (H) 4/6/2012     Sensorineural hearing loss, unspecified      Stented coronary artery 1997    CABG      Testosterone deficiency      Type 2 diabetes mellitus without complication  (goal A1C<8) 10/24/2015     Typical atrial flutter (H) 4/18/16     Unspecified hypothyroidism      Vitamin D deficiency 9/3/2011       PAST SURGICAL HISTORY:  Past Surgical History:   Procedure Laterality Date     ANESTHESIA CARDIOVERSION N/A 2/13/2019    " Procedure: ANESTHESIA CARDIOVERSION;  Surgeon: GENERIC ANESTHESIA PROVIDER;  Location:  OR     C NONSPECIFIC PROCEDURE  1995    CABG ([facility])     C NONSPECIFIC PROCEDURE  8/01    stress echo     CARDIAC SURGERY      bypass in 1997     CARDIOVERSION  04/24/2018     COLONOSCOPY       CORONARY ANGIOGRAPHY ADULT ORDER      4/2/2012     CORONARY ARTERY BYPASS  1997    [facility]     ENDOSCOPIC ULTRASOUND UPPER GASTROINTESTINAL TRACT (GI) N/A 3/14/2019    Procedure: ENDOSCOPIC ULTRASOUND OF UPPER GASTROINTESTINAL TRACT;  Surgeon: Ju Torres MD;  Location:  OR     EXCISE MASS HEAD Left 8/18/2016    Procedure: EXCISE MASS HEAD;  Surgeon: Ivan Hernandez MD;  Location: [facility]     HEART CATH, ANGIOPLASTY  4/2012     LAPAROSCOPIC CHOLECYSTECTOMY N/A 3/17/2019    Procedure: LAPAROSCOPIC CHOLECYSTECTOMY;  Surgeon: Janel Paz MD;  Location:  OR     PHACOEMULSIFICATION CLEAR CORNEA WITH STANDARD INTRAOCULAR LENS IMPLANT Left 6/8/2015    Procedure: PHACOEMULSIFICATION CLEAR CORNEA WITH STANDARD INTRAOCULAR LENS IMPLANT;  Surgeon: Nixon Farnsworth MD;  Location:  EC     PHACOEMULSIFICATION CLEAR CORNEA WITH STANDARD INTRAOCULAR LENS IMPLANT Right 6/22/2015    Procedure: PHACOEMULSIFICATION CLEAR CORNEA WITH STANDARD INTRAOCULAR LENS IMPLANT;  Surgeon: Nixon Farnsworth MD;  Location:  [facility]     SEPTOPLASTY, TURBINOPLASTY, COMBINED Bilateral 8/18/2016    Procedure: COMBINED SEPTOPLASTY, TURBINOPLASTY;  Surgeon: Ivan Hernandez MD;  Location: [facility]       FAMILY HISTORY:  Family History   Problem Relation Age of Onset     Genitourinary Problems Father         d: age 89; ARF     Hypertension Father      Diabetes Mother         d: age 68, CAD     Heart Disease Mother         MI     Myocardial Infarction Mother      Cardiovascular Brother         d:  age 31; CAD     Heart Disease Brother         age 31, MI     Diabetes Sister         b:  1939; DM2       SOCIAL  HISTORY:  Social History     Socioeconomic History     Marital status:      Spouse name: None     Number of children: None     Years of education: None     Highest education level: None   Occupational History     None   Social Needs     Financial resource strain: None     Food insecurity:     Worry: None     Inability: None     Transportation needs:     Medical: None     Non-medical: None   Tobacco Use     Smoking status: Former Smoker     Packs/day: 1.00     Years: 6.00     Pack years: 6.00     Start date:      Last attempt to quit: 1964     Years since quittin.9     Smokeless tobacco: Never Used   Substance and Sexual Activity     Alcohol use: Yes     Comment: couple drinks a month     Drug use: No     Sexual activity: Yes     Partners: Female   Lifestyle     Physical activity:     Days per week: None     Minutes per session: None     Stress: None   Relationships     Social connections:     Talks on phone: None     Gets together: None     Attends Jain service: None     Active member of club or organization: None     Attends meetings of clubs or organizations: None     Relationship status: None     Intimate partner violence:     Fear of current or ex partner: None     Emotionally abused: None     Physically abused: None     Forced sexual activity: None   Other Topics Concern     Parent/sibling w/ CABG, MI or angioplasty before 65F 55M? Not Asked      Service Not Asked     Blood Transfusions Not Asked     Caffeine Concern Yes     Comment: 2 cups coffee a day     Occupational Exposure Not Asked     Hobby Hazards Not Asked     Sleep Concern Yes     Comment: sleep apnea, wears cpap off and on     Stress Concern No     Weight Concern No     Special Diet Yes     Comment: diabetic diet      Back Care Not Asked     Exercise No     Comment: occ walk the mall     Bike Helmet Not Asked     Seat Belt Yes     Self-Exams Not Asked   Social History Narrative     None       Review of  "Systems:  Skin:  Negative     Eyes:  Positive for glasses  ENT:  Positive for nasal congestion  Respiratory:  Positive for sleep apnea  Cardiovascular:  Negative for;palpitations;chest pain;dizziness;lightheadedness;edema fatigue;Positive for;exercise intolerance  Gastroenterology: Positive for    Genitourinary:  Negative    Musculoskeletal:  Positive for joint pain  Neurologic:  Positive for numbness or tingling of feet  Psychiatric:  Negative    Heme/Lymph/Imm:  Negative    Endocrine:  Positive for diabetes;thyroid disorder;night sweats    Physical Exam:  Vitals: /72   Pulse 73   Ht 1.803 m (5' 11\")   Wt 90.4 kg (199 lb 4.8 oz)   BMI 27.80 kg/m       Constitutional:  cooperative, alert and oriented, well developed, well nourished, in no acute distress        Skin:  warm and dry to the touch        Head:  normocephalic, no masses or lesions        Eyes:  pupils equal and round;sclera white;conjunctivae and lids unremarkable        ENT:  no pallor or cyanosis, dentition good        Neck:           Chest:  normal breath sounds, clear to auscultation, normal A-P diameter, normal symmetry, normal respiratory excursion, no use of accessory muscles        Cardiac: normal S1 and S2 irregularly irregular rhythm     systolic ejection murmur          Abdomen:  BS normoactive obese Firm belly. BS present.     Vascular: pulses full and equal                                      Extremities and Back:  no deformities, clubbing, cyanosis, erythema observed        Neurological:  no gross motor deficits          Recent Lab Results:  LIPID RESULTS:  Lab Results   Component Value Date    CHOL 105 08/13/2018    HDL 32 (L) 08/13/2018    LDL 38 08/13/2018    TRIG 175 (H) 08/13/2018    CHOLHDLRATIO 3.3 05/08/2015       LIVER ENZYME RESULTS:  Lab Results   Component Value Date    AST 20 04/25/2019    ALT 45 04/25/2019       CBC RESULTS:  Lab Results   Component Value Date    WBC 6.5 03/22/2019    RBC 5.12 03/22/2019    HGB 15.5 " 04/19/2019    HCT 44.4 03/22/2019    MCV 87 03/22/2019    MCH 28.3 03/22/2019    MCHC 32.7 03/22/2019    RDW 16.2 (H) 03/22/2019     03/22/2019       BMP RESULTS:  Lab Results   Component Value Date     05/13/2019    POTASSIUM 3.7 05/13/2019    CHLORIDE 100 05/13/2019    CO2 30 (H) 05/13/2019    ANIONGAP 11.7 05/13/2019     (H) 05/13/2019    BUN 12 05/13/2019    CR 1.24 05/13/2019    GFRESTIMATED 57 (L) 05/13/2019    GFRESTBLACK 68 05/13/2019    STARR 10.5 05/13/2019        A1C RESULTS:  Lab Results   Component Value Date    A1C 7.4 (H) 03/14/2019       INR RESULTS:  Lab Results   Component Value Date    INR 1.09 03/17/2019    INR 1.37 (H) 03/14/2019                 Thank you for allowing me to participate in the care of your patient.    Sincerely,     Rebekah Marrero PA-C     [facility]

## 2019-05-20 PROBLEM — N18.30 CKD (CHRONIC KIDNEY DISEASE) STAGE 3, GFR 30-59 ML/MIN (H): Status: ACTIVE | Noted: 2019-05-20

## 2019-05-22 ENCOUNTER — HOSPITAL ENCOUNTER (OUTPATIENT)
Dept: MEDSURG UNIT | Facility: CLINIC | Age: 79
End: 2019-05-22
Attending: PHYSICIAN ASSISTANT
Payer: COMMERCIAL

## 2019-05-22 ENCOUNTER — ANESTHESIA EVENT (OUTPATIENT)
Dept: SURGERY | Facility: CLINIC | Age: 79
End: 2019-05-22
Payer: COMMERCIAL

## 2019-05-22 ENCOUNTER — ANESTHESIA (OUTPATIENT)
Dept: SURGERY | Facility: CLINIC | Age: 79
End: 2019-05-22
Payer: COMMERCIAL

## 2019-05-22 ENCOUNTER — HOSPITAL ENCOUNTER (OUTPATIENT)
Facility: CLINIC | Age: 79
Discharge: HOME | End: 2019-05-22
Admitting: INTERNAL MEDICINE
Payer: COMMERCIAL

## 2019-05-22 VITALS
SYSTOLIC BLOOD PRESSURE: 159 MMHG | RESPIRATION RATE: 10 BRPM | OXYGEN SATURATION: 95 % | DIASTOLIC BLOOD PRESSURE: 96 MMHG | WEIGHT: 199.5 LBS | HEART RATE: 71 BPM | BODY MASS INDEX: 27.02 KG/M2 | HEIGHT: 72 IN

## 2019-05-22 DIAGNOSIS — I48.19 PERSISTENT ATRIAL FIBRILLATION (H): ICD-10-CM

## 2019-05-22 LAB
GLUCOSE SERPL-MCNC: 178 MG/DL (ref 70–99)
INR PPP: 1.43 (ref 0.86–1.14)
MAGNESIUM SERPL-MCNC: 1.8 MG/DL (ref 1.6–2.3)
POTASSIUM SERPL-SCNC: 4.3 MMOL/L (ref 3.4–5.3)

## 2019-05-22 PROCEDURE — 84132 ASSAY OF SERUM POTASSIUM: CPT | Performed by: INTERNAL MEDICINE

## 2019-05-22 PROCEDURE — 40000235 ZZH STATISTIC TELEMETRY

## 2019-05-22 PROCEDURE — 93010 ELECTROCARDIOGRAM REPORT: CPT | Mod: 76 | Performed by: INTERNAL MEDICINE

## 2019-05-22 PROCEDURE — 83735 ASSAY OF MAGNESIUM: CPT | Performed by: INTERNAL MEDICINE

## 2019-05-22 PROCEDURE — 36415 COLL VENOUS BLD VENIPUNCTURE: CPT

## 2019-05-22 PROCEDURE — 25000128 H RX IP 250 OP 636: Performed by: INTERNAL MEDICINE

## 2019-05-22 PROCEDURE — 82947 ASSAY GLUCOSE BLOOD QUANT: CPT | Performed by: INTERNAL MEDICINE

## 2019-05-22 PROCEDURE — 85610 PROTHROMBIN TIME: CPT | Performed by: INTERNAL MEDICINE

## 2019-05-22 PROCEDURE — 40000065 ZZH STATISTIC EKG NON-CHARGEABLE

## 2019-05-22 PROCEDURE — 25000132 ZZH RX MED GY IP 250 OP 250 PS 637: Performed by: INTERNAL MEDICINE

## 2019-05-22 PROCEDURE — 25800030 ZZH RX IP 258 OP 636: Performed by: INTERNAL MEDICINE

## 2019-05-22 PROCEDURE — 37000008 ZZH ANESTHESIA TECHNICAL FEE, 1ST 30 MIN

## 2019-05-22 PROCEDURE — 92960 CARDIOVERSION ELECTRIC EXT: CPT

## 2019-05-22 PROCEDURE — 40000671 ZZH STATISTIC ANESTHESIA CASE

## 2019-05-22 PROCEDURE — 25000128 H RX IP 250 OP 636: Performed by: NURSE ANESTHETIST, CERTIFIED REGISTERED

## 2019-05-22 PROCEDURE — 40000010 ZZH STATISTIC ANES STAT CODE-CRNA PER MINUTE

## 2019-05-22 PROCEDURE — 93005 ELECTROCARDIOGRAM TRACING: CPT

## 2019-05-22 PROCEDURE — 92960 CARDIOVERSION ELECTRIC EXT: CPT | Performed by: INTERNAL MEDICINE

## 2019-05-22 PROCEDURE — 36415 COLL VENOUS BLD VENIPUNCTURE: CPT | Performed by: INTERNAL MEDICINE

## 2019-05-22 RX ORDER — SODIUM CHLORIDE 9 MG/ML
INJECTION, SOLUTION INTRAVENOUS CONTINUOUS
Status: DISCONTINUED | OUTPATIENT
Start: 2019-05-22 | End: 2019-05-22 | Stop reason: HOSPADM

## 2019-05-22 RX ORDER — PROPOFOL 10 MG/ML
INJECTION, EMULSION INTRAVENOUS PRN
Status: DISCONTINUED | OUTPATIENT
Start: 2019-05-22 | End: 2019-05-22

## 2019-05-22 RX ORDER — FLUMAZENIL 0.1 MG/ML
0.2 INJECTION, SOLUTION INTRAVENOUS
Status: DISCONTINUED | OUTPATIENT
Start: 2019-05-22 | End: 2019-05-22 | Stop reason: HOSPADM

## 2019-05-22 RX ORDER — POTASSIUM CHLORIDE 1500 MG/1
40 TABLET, EXTENDED RELEASE ORAL
Status: DISCONTINUED | OUTPATIENT
Start: 2019-05-22 | End: 2019-05-22 | Stop reason: HOSPADM

## 2019-05-22 RX ORDER — ATROPINE SULFATE 0.1 MG/ML
.5-1 INJECTION INTRAVENOUS
Status: DISCONTINUED | OUTPATIENT
Start: 2019-05-22 | End: 2019-05-22 | Stop reason: HOSPADM

## 2019-05-22 RX ORDER — MAGNESIUM SULFATE HEPTAHYDRATE 40 MG/ML
2 INJECTION, SOLUTION INTRAVENOUS
Status: DISCONTINUED | OUTPATIENT
Start: 2019-05-22 | End: 2019-05-22 | Stop reason: HOSPADM

## 2019-05-22 RX ORDER — NALOXONE HYDROCHLORIDE 0.4 MG/ML
.1-.4 INJECTION, SOLUTION INTRAMUSCULAR; INTRAVENOUS; SUBCUTANEOUS
Status: DISCONTINUED | OUTPATIENT
Start: 2019-05-22 | End: 2019-05-22 | Stop reason: HOSPADM

## 2019-05-22 RX ORDER — POTASSIUM CHLORIDE 1500 MG/1
20 TABLET, EXTENDED RELEASE ORAL
Status: DISCONTINUED | OUTPATIENT
Start: 2019-05-22 | End: 2019-05-22 | Stop reason: HOSPADM

## 2019-05-22 RX ADMIN — MAGNESIUM SULFATE HEPTAHYDRATE 2 G: 40 INJECTION, SOLUTION INTRAVENOUS at 10:39

## 2019-05-22 RX ADMIN — SODIUM CHLORIDE: 9 INJECTION, SOLUTION INTRAVENOUS at 09:33

## 2019-05-22 RX ADMIN — PROPOFOL 60 MG: 10 INJECTION, EMULSION INTRAVENOUS at 11:22

## 2019-05-22 RX ADMIN — APIXABAN 5 MG: 5 TABLET, FILM COATED ORAL at 10:08

## 2019-05-22 ASSESSMENT — MIFFLIN-ST. JEOR: SCORE: 1667.93

## 2019-05-22 NOTE — PROCEDURES
Procedure/Surgery Information   [facility]     Procedure Note  Date of Service (when I performed the procedure): 05/22/2019    Procedure: Elective cardioversion    Indication:  Sedation is required to allow for Cardioversion    Consent obtained from patient after discussing the risks, benefits and alternatives.    PO Intake:  Appropriately NPO for procedure    Objective:  Vitals were reviewed  Physical Exam    I have reviewed the lab findings, diagnostic data, medications, and the plan for procedure. I have determined this patient to be an appropriate candidate for the planned procedure and have reassessed the patient IMMEDIATELY PRIOR to ocedure.  Prior to the start of the procedure and with procedural staff participation, I verbally confirmed the patient s identity using two indicators, relevant allergies, that the procedure was appropriate and matched the consent or emergent situation, and that the correct equipment/implants were available. Immediately prior to starting the procedure I conducted the Time Out with the procedural staff and re-confirmed the patient s name, procedure, and site/side. (The Joint Commission universal protocol was followed.)  Yes    Procedures      Sedatives: Per anesthesia    Vital signs, airway, End Tidal CO2 and pulse oximetry were monitored and remained stable throughout the procedure and appropriate sedation was maintained until the procedure was complete.  The patient was monitored by staff until sedation discharge criteria were met.    Patient tolerance: Patient tolerated the procedure well with no immediate complications.    Time of sedation in minutes:  5 minutes from beginning to end of physician one to one monitoring.    Performed by: Ge Richard  Authorized by: Ge Richard

## 2019-05-22 NOTE — PROCEDURES
Informed consent obtained for elective DC cardioversion of atrial fibrillation. Rhythm confirmed with pre-procedure ECG.  Serum potassium 4.3, creatinine 1.2 , hemoglobin 15.5.  LVEF 55%. Patient is on Amiodarone 100 mg daily and uninterrupted Eliquis anticoagulation 5 mg two times daily.    Patient was successfully cardioverted to sinus rhythm with a single 200 J synchronized DC shock. Sedation by anesthesia team.      RECOMMENDATIONS:  1.  Continue Amiodarone and uninterrupted Eliquis anticoagulation.  2.  Follow up in EP clinic .     Dr. ZACHARY Richard MD [facility]  Cardiology.

## 2019-05-22 NOTE — ANESTHESIA CARE TRANSFER NOTE
Patient: Pascual Perea    * No procedures listed *    Diagnosis: * No pre-op diagnosis entered *  Diagnosis Additional Information: No value filed.    Anesthesia Type:   No value filed.     Note:  Airway :Nasal Cannula  Patient transferred to:Telemetry/Step Down Unit  Comments: Diagnosis: A-Fib  Procedure: Cardioversion  Cardiologist: Dr. Gee  Location: [facility] Room #20    At end of procedure, spontaneous respirations, patient alert to voice, able to follow commands. Oxygen via nasal cannula at 4 liters per minute to PACU. Oxygen tubing connected to wall O2 in PACU, SpO2, NiBP, and EKG monitors and alarms on and functioning, Amanda Hugger warmer connected to patient gown, report on patient's clinical status given to PACU RN, RN questions answered.Handoff Report: Identifed the Patient, Identified the Reponsible Provider, Reviewed the pertinent medical history, Discussed the surgical course, Reviewed Intra-OP anesthesia mangement and issues during anesthesia, Set expectations for post-procedure period and Allowed opportunity for questions and acknowledgement of understanding      Vitals: (Last set prior to Anesthesia Care Transfer)    CRNA VITALS  5/22/2019 1100 - 5/22/2019 1134      5/22/2019             NIBP:  164/89    Pulse:  61    SpO2:  100 %                Electronically Signed By: JAKI Yip CRNA  May 22, 2019  11:34 AM

## 2019-05-22 NOTE — PROGRESS NOTES
Care Suites Discharge Nursing Note    Education/questions answered: yes  Patient DC location: home  Accompanied by: isra - spouse and isadora - son/  CS discharge time: 1239

## 2019-05-22 NOTE — ANESTHESIA POSTPROCEDURE EVALUATION
Patient: Pascual Perea    Procedure(s):  ANESTHESIA, FOR CARDIOVERSION    Diagnosis:afib  Diagnosis Additional Information: No value filed.    Anesthesia Type:  General, Other    Note:  Anesthesia Post Evaluation    Patient location during evaluation: bedside  Patient participation: Able to fully participate in evaluation  Level of consciousness: awake and alert  Pain management: adequate  Airway patency: patent  Cardiovascular status: acceptable  Respiratory status: acceptable  Hydration status: acceptable  PONV: none and controlled     Anesthetic complications: None          Last vitals:  Vitals:    05/22/19 1136 05/22/19 1138 05/22/19 1140   BP: 156/83 146/89 153/79   Pulse: 64 66 65   Resp:  (!) 0 19   Temp:      SpO2:  100% 99%         Electronically Signed By: Bon Shah MD  May 22, 2019  11:46 AM

## 2019-05-22 NOTE — DISCHARGE INSTRUCTIONS
Cardioversion Discharge Instructions    After you go home:       For 24 hours - due to the sedation you received:      Have an adult stay with you for 24 hours.     Relax and take it easy.    Do NOT make any important or legal decisions.    Do NOT drive or operate machines at home or at work.    Do NOT drink alcohol.    Diet:      Start with clear liquids and progress to your normal diet as you feel able.    Medicines:      Take your medications, including blood thinners, unless your provider tells you not to.    If you have stopped any medications, check with your provider about when to restart them.    Follow Up Appointments:      Follow up with your cardiologist at [facility] of patient preference as instructed.    Follow up with your primary care provider as needed.    Post cardioversion:    The skin on your chest or back may feel tender for 48 hours.  If your skin is tender, you may:      Use a cold pack on the site. Never use ice directly on your skin. Use the cold pack for 20 minutes. Remove it for at least 30 minutes before re-using.    Apply 1% hydrocortisone cream to the skin (sold at drug stores)    Take Advil (Ibuprofen) or Tylenol (Acetaminophen) per your provider's recommendations.      Call your provider if you have:      Weakness, dizziness, lightheadedness, or fainting.    Shortness of breath.    Irregular heartbeat, feelings of your heart fluttering or beating fast, hard or palpitations.     More than minor skin discomfort or redness where the cardioversion pads were placed.    Questions or concerns.      Call 911 if you have:      Pain in your chest, arm, shoulder, neck, or upper back.    You have problems speaking or seeing.    Weakness in your arm or leg.    You are unable to move your arm or leg.    You have uncontrolled bleeding.         [facility] [place]:    [phone] [facility] (7 days a week)

## 2019-05-22 NOTE — ANESTHESIA PREPROCEDURE EVALUATION
Procedure: Procedure(s):  ANESTHESIA, FOR CARDIOVERSION  Preop diagnosis: afib    Allergies   Allergen Reactions     Omeprazole      diarrhea     Past Medical History:   Diagnosis Date     Adhesive capsulitis of shoulder      Anemia 3/10/2014     Anemia, unspecified      Antiplatelet or antithrombotic long-term use      Arrhythmia     Atrial fib/flutter     CAD (coronary artery disease)      History of cardioversion 04/24/2018    a single synchronized shock of 120 joules restored normal sinus rhythm     History of cardioversion 02/13/2019    single shock , 200 joules successul in restoring NSR     Hyperlipidemia LDL goal <70 5/26/2011     Hypertension      Hypothyroidism      Impotence of organic origin      Laceration of finger  June 2010     left thumb s/p sutures     Numbness and tingling     diabetic neuropathy bilateral feet     BRANDON (obstructive sleep apnea)     intolerant of CPAP, uses it occasionally.  Using mandibular device occasionally     Osteopenia      Pulmonary hypertension (H) 4/6/2012     Sensorineural hearing loss, unspecified      Stented coronary artery 1997    CABG      Testosterone deficiency      Type 2 diabetes mellitus without complication  (goal A1C<8) 10/24/2015     Typical atrial flutter (H) 4/18/16     Unspecified hypothyroidism      Vitamin D deficiency 9/3/2011     Past Surgical History:   Procedure Laterality Date     ANESTHESIA CARDIOVERSION N/A 2/13/2019    Procedure: ANESTHESIA CARDIOVERSION;  Surgeon: GENERIC ANESTHESIA PROVIDER;  Location: [facility]     C NONSPECIFIC PROCEDURE  1995    CABG ([facility])     C NONSPECIFIC PROCEDURE  8/01    stress echo     CARDIAC SURGERY      bypass in 1997     CARDIOVERSION  04/24/2018     COLONOSCOPY       CORONARY ANGIOGRAPHY ADULT ORDER      4/2/2012     CORONARY ARTERY BYPASS  1997    [facility]     ENDOSCOPIC ULTRASOUND UPPER GASTROINTESTINAL TRACT (GI) N/A 3/14/2019    Procedure: ENDOSCOPIC ULTRASOUND OF UPPER  GASTROINTESTINAL TRACT;  Surgeon: Ju Torres MD;  Location:  OR     EXCISE MASS HEAD Left 2016    Procedure: EXCISE MASS HEAD;  Surgeon: Ivan Hernandez MD;  Location: [facility], [place]     HEART CATH, ANGIOPLASTY  2012     LAPAROSCOPIC CHOLECYSTECTOMY N/A 3/17/2019    Procedure: LAPAROSCOPIC CHOLECYSTECTOMY;  Surgeon: Janel Paz MD;  Location:  OR     PHACOEMULSIFICATION CLEAR CORNEA WITH STANDARD INTRAOCULAR LENS IMPLANT Left 2015    Procedure: PHACOEMULSIFICATION CLEAR CORNEA WITH STANDARD INTRAOCULAR LENS IMPLANT;  Surgeon: Nixon Farnsworth MD;  Location:  [facility]     PHACOEMULSIFICATION CLEAR CORNEA WITH STANDARD INTRAOCULAR LENS IMPLANT Right 2015    Procedure: PHACOEMULSIFICATION CLEAR CORNEA WITH STANDARD INTRAOCULAR LENS IMPLANT;  Surgeon: Nixon Farnsworth MD;  Location:  [facility]     SEPTOPLASTY, TURBINOPLASTY, COMBINED Bilateral 2016    Procedure: COMBINED SEPTOPLASTY, TURBINOPLASTY;  Surgeon: Ivan Hernandez MD;  Location: [facility], [place]     Social History     Tobacco Use     Smoking status: Former Smoker     Packs/day: 1.00     Years: 6.00     Pack years: 6.00     Start date:      Last attempt to quit: 1964     Years since quittin.9     Smokeless tobacco: Never Used   Substance Use Topics     Alcohol use: Yes     Comment: couple drinks a month     Prior to Admission medications    Medication Sig Start Date End Date Taking? Authorizing Provider   amiodarone (PACERONE/CODARONE) 200 MG tablet Take 0.5 tablets (100 mg) by mouth every morning 19  Yes Rebekah Marrero PA-C   apixaban ANTICOAGULANT (ELIQUIS) 5 MG tablet Take 1 tablet (5 mg) by mouth 2 times daily 18  Yes Speedy Olivas MD   Cholecalciferol (VITAMIN D3 PO) Take 5,000 Units by mouth daily   Yes Unknown, Entered By History   fexofenadine (ALLEGRA) 180 MG tablet Take 1 tablet (180 mg) by mouth daily 19 Yes Andrew Elizalde MD   fluticasone (FLONASE) 50 MCG/ACT spray USE  ONE TO TWO SPRAYS IN EACH NOSTRIL EVERY DAY 1/31/18  Yes Andrew Elizalde MD   furosemide (LASIX) 20 MG tablet Take 1 tablet (20 mg) by mouth daily 4/30/19  Yes Andrew Elizalde MD   insulin aspart (NOVOLOG FLEXPEN) 100 UNIT/ML pen Inject 3 times daily (before meals). 2 units per carb choice (15gm). Approx 5-10 units/meal. 4/10/19  Yes Andrew Elizalde MD   insulin detemir (LEVEMIR FLEXPEN/FLEXTOUCH) 100 UNIT/ML pen Inject 35-40 Units Subcutaneous At Bedtime  Patient taking differently: Inject 35 Units Subcutaneous At Bedtime  12/4/18  Yes Andrew Elizalde MD   levothyroxine (SYNTHROID/LEVOTHROID) 125 MCG tablet TAKE ONE TABLET BY MOUTH EVERY DAY 3/26/19  Yes Ananya Mayfield PA-C   lisinopril (PRINIVIL/ZESTRIL) 30 MG tablet Take 1 tablet (30 mg) by mouth daily 4/19/19  Yes Rebekah Marrero PA-C   pravastatin (PRAVACHOL) 80 MG tablet Take 1 tablet (80 mg) by mouth At Bedtime 4/19/19  Yes Rebekah Marrero PA-C   ranitidine (ZANTAC) 150 MG tablet TAKE ONE TABLET BY MOUTH TWICE A DAY 2/20/19  Yes Andrew Elizalde MD   testosterone cypionate (DEPOTESTOSTERONE) 200 MG/ML injection Inject 1.5 mLs (300 mg) into the muscle every 14 days 2/20/19  Yes Andrew Elizalde MD   GLUCOSE 4 GM OR CHEW as directed 2/10/06   Andrew Elizalde MD   nitroglycerin (NITROSTAT) 0.4 MG SL tablet Place 1 tablet (0.4 mg) under the tongue every 5 minutes as needed for chest pain 7/8/14   Speedy Olivas MD     Current Facility-Administered Medications Ordered in Epic   Medication Dose Route Frequency Last Rate Last Dose     atropine injection 0.5-1 mg  0.5-1 mg Intravenous Once PRN         flumazenil (ROMAZICON) injection 0.2 mg  0.2 mg Intravenous q1 min prn         magnesium sulfate 2 g in water intermittent infusion  2 g Intravenous Q1H PRN 50 mL/hr at 05/22/19 1039 2 g at 05/22/19 1039     naloxone (NARCAN) injection 0.1-0.4 mg  0.1-0.4 mg Intravenous Q2 Min PRN         potassium chloride ER (K-DUR/KLOR-CON M) CR tablet 20 mEq  20 mEq  Oral Q1H PRN         potassium chloride ER (K-DUR/KLOR-CON M) CR tablet 40 mEq  40 mEq Oral Q1H PRN         sodium chloride (PF) 0.9% PF flush 3 mL  3 mL Intravenous Q1H PRN         sodium chloride (PF) 0.9% PF flush 3 mL  3 mL Intravenous Q8H PRN         sodium chloride 0.9% infusion   Intravenous Continuous 30 mL/hr at 05/22/19 0933       No current [facility]-ordered outpatient medications on file.       sodium chloride 30 mL/hr at 05/22/19 0933     Wt Readings from Last 1 Encounters:   05/22/19 90.5 kg (199 lb 8 oz)     Temp Readings from Last 1 Encounters:   05/22/19 (P) 36.8  C (98.2  F) (Oral)     BP Readings from Last 6 Encounters:   05/22/19 153/79   05/13/19 142/72   04/25/19 146/82   04/19/19 162/87   03/22/19 148/72   03/18/19 155/83     Pulse Readings from Last 4 Encounters:   05/22/19 65   05/13/19 73   04/25/19 64   04/19/19 52     Resp Readings from Last 1 Encounters:   05/22/19 19     SpO2 Readings from Last 1 Encounters:   05/22/19 99%     Recent Labs   Lab Test 05/22/19  0915 05/13/19  1216 04/25/19  1630   NA  --  138 140   POTASSIUM 4.3 3.7 4.2   CHLORIDE  --  100 105   CO2  --  30* 30   ANIONGAP  --  11.7 5   * 180* 189*   BUN  --  12 14   CR  --  1.24 1.29*   STARR  --  10.5 9.8     Recent Labs   Lab Test 04/25/19  1630 04/19/19  0848  03/13/19  2020  03/30/12 2005   AST 20 25   < > 249*   < > 28   ALT 45 46   < > 271*   < > 29   ALKPHOS 187* 196*   < > 127   < > 52   BILITOTAL 1.7* 2.1*   < > 8.5*   < > 0.5   LIPASE  --   --   --  121  --  70    < > = values in this interval not displayed.     Recent Labs   Lab Test 04/19/19  0848 03/29/19  0912 03/22/19  1453 03/18/19  0941   WBC  --   --  6.5 7.1   HGB 15.5 14.1 14.5 15.3   PLT  --   --  193 172     Recent Labs   Lab Test 03/30/12 2005   ABO A   RH  Neg     Recent Labs   Lab Test 05/22/19  0948 03/17/19  0541  04/18/18  0550 04/17/18  1205   INR 1.43* 1.09   < >  --   --    PTT  --   --   --  52* 52*    < > = values in this interval not  displayed.      Recent Labs   Lab Test 18  1335 18  2231 18  1647   TROPI 0.052* 0.063* 0.052*     No results for input(s): PH, PCO2, PO2, HCO3 in the last 04554 hours.  No results for input(s): HCG in the last 84776 hours.  No results found for this or any previous visit (from the past 744 hour(s)).    RECENT LABS:   ECG:   ECHO:   Anesthesia Pre-Procedure Evaluation    Patient: Pascual Perea   MRN: [id number] : 1940          Preoperative Diagnosis: afib    Procedure(s):  ANESTHESIA, FOR CARDIOVERSION    Past Medical History:   Diagnosis Date     Adhesive capsulitis of shoulder      Anemia 3/10/2014     Anemia, unspecified      Antiplatelet or antithrombotic long-term use      Arrhythmia     Atrial fib/flutter     CAD (coronary artery disease)      History of cardioversion 2018    a single synchronized shock of 120 joules restored normal sinus rhythm     History of cardioversion 2019    single shock , 200 joules successul in restoring NSR     Hyperlipidemia LDL goal <70 2011     Hypertension      Hypothyroidism      Impotence of organic origin      Laceration of finger  2010     left thumb s/p sutures     Numbness and tingling     diabetic neuropathy bilateral feet     BRANDON (obstructive sleep apnea)     intolerant of CPAP, uses it occasionally.  Using mandibular device occasionally     Osteopenia      Pulmonary hypertension (H) 2012     Sensorineural hearing loss, unspecified      Stented coronary artery     CABG      Testosterone deficiency      Type 2 diabetes mellitus without complication  (goal A1C<8) 10/24/2015     Typical atrial flutter (H) 16     Unspecified hypothyroidism      Vitamin D deficiency 9/3/2011     Past Surgical History:   Procedure Laterality Date     ANESTHESIA CARDIOVERSION N/A 2019    Procedure: ANESTHESIA CARDIOVERSION;  Surgeon: GENERIC ANESTHESIA PROVIDER;  Location: [facility] OR     C NONSPECIFIC PROCEDURE      CABG  ([facility])     C NONSPECIFIC PROCEDURE  8/01    stress echo     CARDIAC SURGERY      bypass in 1997     CARDIOVERSION  04/24/2018     COLONOSCOPY       CORONARY ANGIOGRAPHY ADULT ORDER      4/2/2012     CORONARY ARTERY BYPASS  1997    [facility]     ENDOSCOPIC ULTRASOUND UPPER GASTROINTESTINAL TRACT (GI) N/A 3/14/2019    Procedure: ENDOSCOPIC ULTRASOUND OF UPPER GASTROINTESTINAL TRACT;  Surgeon: Ju Torres MD;  Location:  OR     EXCISE MASS HEAD Left 8/18/2016    Procedure: EXCISE MASS HEAD;  Surgeon: Ivan Hernandez MD;  Location: [facility]     HEART CATH, ANGIOPLASTY  4/2012     LAPAROSCOPIC CHOLECYSTECTOMY N/A 3/17/2019    Procedure: LAPAROSCOPIC CHOLECYSTECTOMY;  Surgeon: Janel Paz MD;  Location:  OR     PHACOEMULSIFICATION CLEAR CORNEA WITH STANDARD INTRAOCULAR LENS IMPLANT Left 6/8/2015    Procedure: PHACOEMULSIFICATION CLEAR CORNEA WITH STANDARD INTRAOCULAR LENS IMPLANT;  Surgeon: Nixon Farnsworth MD;  Location:  [facility]     PHACOEMULSIFICATION CLEAR CORNEA WITH STANDARD INTRAOCULAR LENS IMPLANT Right 6/22/2015    Procedure: PHACOEMULSIFICATION CLEAR CORNEA WITH STANDARD INTRAOCULAR LENS IMPLANT;  Surgeon: Nixon Farnsworth MD;  Location:  [facility]     SEPTOPLASTY, TURBINOPLASTY, COMBINED Bilateral 8/18/2016    Procedure: COMBINED SEPTOPLASTY, TURBINOPLASTY;  Surgeon: Ivan Hernandez MD;  Location: [facility]       Anesthesia Evaluation     . Pt has had prior anesthetic.     No history of anesthetic complications          ROS/MED HX    ENT/Pulmonary:     (+)sleep apnea, , . .    Neurologic:       Cardiovascular:     (+) hypertension--CAD, --. Taking blood thinners : . . . :. Irregular Heartbeat/Palpitations, . pulmonary hypertension,       METS/Exercise Tolerance:     Hematologic:         Musculoskeletal:         GI/Hepatic:     (+) GERD       Renal/Genitourinary:     (+) chronic renal disease, type: CRI,       Endo:     (+) type II DM thyroid problem  hypothyroidism, .      Psychiatric:         Infectious Disease:         Malignancy:         Other:                          Physical Exam  Normal systems: cardiovascular and pulmonary    Airway   Mallampati: I  Neck ROM: full    Dental   (+) caps    Cardiovascular   Rhythm and rate: regular and abnormal      Pulmonary             Lab Results   Component Value Date    WBC 6.5 03/22/2019    HGB 15.5 04/19/2019    HCT 44.4 03/22/2019     03/22/2019    SED 4 02/24/2017     05/13/2019    POTASSIUM 4.3 05/22/2019    CHLORIDE 100 05/13/2019    CO2 30 (H) 05/13/2019    BUN 12 05/13/2019    CR 1.24 05/13/2019     (H) 05/22/2019    STARR 10.5 05/13/2019    PHOS 2.4 (L) 03/16/2019    MAG 1.8 05/22/2019    ALBUMIN 3.7 04/25/2019    PROTTOTAL 6.8 04/25/2019    ALT 45 04/25/2019    AST 20 04/25/2019    ALKPHOS 187 (H) 04/25/2019    BILITOTAL 1.7 (H) 04/25/2019    LIPASE 121 03/13/2019    PTT 52 (H) 04/18/2018    INR 1.43 (H) 05/22/2019    TSH 5.52 (H) 04/25/2019    T4 1.08 04/25/2019       Preop Vitals  BP Readings from Last 3 Encounters:   05/22/19 153/79   05/13/19 142/72   04/25/19 146/82    Pulse Readings from Last 3 Encounters:   05/22/19 65   05/13/19 73   04/25/19 64      Resp Readings from Last 3 Encounters:   05/22/19 19   04/25/19 16   03/22/19 16    SpO2 Readings from Last 3 Encounters:   05/22/19 99%   04/25/19 99%   03/22/19 95%      Temp Readings from Last 1 Encounters:   05/22/19 (P) 36.8  C (98.2  F) (Oral)    Ht Readings from Last 1 Encounters:   05/22/19 1.829 m (6')      Wt Readings from Last 1 Encounters:   05/22/19 90.5 kg (199 lb 8 oz)    Estimated body mass index is 27.06 kg/m  as calculated from the following:    Height as of this encounter: 1.829 m (6').    Weight as of this encounter: 90.5 kg (199 lb 8 oz).       Anesthesia Plan      History & Physical Review  History and physical reviewed and following examination; no interval change.    ASA Status:  3 .    NPO Status:  > 8  hours    Plan for General and Other          Postoperative Care  Postoperative pain management:  IV analgesics.      Consents  Anesthetic plan, risks, benefits and alternatives discussed with:  Patient..                 Bon Shah MD

## 2019-05-22 NOTE — PROGRESS NOTES
Care Suites Admission Nursing Note    Reason for admission: cardioversion   CS arrival time: 0845  Accompanied by: john  Name/phone of DC : Harpreet - ortiz - [phone]  Medications held: all - MD notified re: not taking eliquis this AM - orders to give eliquis received  Consent signed: no  Abnormal assessment/labs: pending  Education/questions answered: yes

## 2019-05-22 NOTE — PROGRESS NOTES
Care Suites Procedure Nursing Note    Procedure: cardioversion  Procedure started time: 1119  Procedure completed time: 1125  Concerns/abnormal assessment: none  Plan: post ECG obtained showing SR with 1str degree AVBlock  200 joule cardioversion at 1124 for afib

## 2019-05-23 ENCOUNTER — TELEPHONE (OUTPATIENT)
Dept: CARDIOLOGY | Facility: CLINIC | Age: 79
End: 2019-05-23

## 2019-05-23 NOTE — TELEPHONE ENCOUNTER
05/23/19 Called pt to f/u 1 day post cardioversion. Pt has no complaints. Reviewed Dr Jiménez note and recommendations including continuing Eliquis and Amiodarone. Pt set up for f/u appt in 1 month w Joleen Marrero PA-C on 6/24/19 at 2:30 pm and therefore appt 5/30 w Joleen cancelled. Pt voiced understanding.  Christian 8:45 am

## 2019-05-24 LAB — INTERPRETATION ECG - MUSE: NORMAL

## 2019-05-25 LAB — INTERPRETATION ECG - MUSE: NORMAL

## 2019-05-30 ENCOUNTER — OFFICE VISIT (OUTPATIENT)
Dept: INTERNAL MEDICINE | Facility: CLINIC | Age: 79
End: 2019-05-30
Payer: COMMERCIAL

## 2019-05-30 VITALS
HEART RATE: 68 BPM | BODY MASS INDEX: 26.85 KG/M2 | RESPIRATION RATE: 16 BRPM | TEMPERATURE: 97.8 F | OXYGEN SATURATION: 97 % | DIASTOLIC BLOOD PRESSURE: 70 MMHG | SYSTOLIC BLOOD PRESSURE: 126 MMHG | WEIGHT: 198 LBS

## 2019-05-30 DIAGNOSIS — Z79.4 TYPE 2 DIABETES MELLITUS WITHOUT COMPLICATION, WITH LONG-TERM CURRENT USE OF INSULIN (H): ICD-10-CM

## 2019-05-30 DIAGNOSIS — E11.9 TYPE 2 DIABETES MELLITUS WITHOUT COMPLICATION, WITH LONG-TERM CURRENT USE OF INSULIN (H): ICD-10-CM

## 2019-05-30 DIAGNOSIS — K21.9 GASTROESOPHAGEAL REFLUX DISEASE WITHOUT ESOPHAGITIS: Primary | ICD-10-CM

## 2019-05-30 DIAGNOSIS — N18.30 CKD (CHRONIC KIDNEY DISEASE) STAGE 3, GFR 30-59 ML/MIN (H): ICD-10-CM

## 2019-05-30 DIAGNOSIS — E03.9 HYPOTHYROIDISM, UNSPECIFIED TYPE: ICD-10-CM

## 2019-05-30 DIAGNOSIS — E78.5 HYPERLIPIDEMIA LDL GOAL <70: ICD-10-CM

## 2019-05-30 DIAGNOSIS — R60.9 EDEMA, UNSPECIFIED TYPE: ICD-10-CM

## 2019-05-30 PROCEDURE — 99214 OFFICE O/P EST MOD 30 MIN: CPT | Performed by: INTERNAL MEDICINE

## 2019-05-30 NOTE — PATIENT INSTRUCTIONS
Restart Metformin 1000mg tab, 1 tab twice a day for diabetes  Try reducing Ranitidine to 1 tab daily in AM for heart burn. If heartburn worse, then go back to 1 tab twice a day. If fine with once a day for a few weeks, then can try stopping it to see if still necessary  Continue other meds.  Call  [phone] or use [employer] to schedule a future lab appointment  non-fasting in early/mid July for diabetes and thyroid  Low sodium/salt intake  If future issues with persistent   leg swelling again, contact clinic  Testosterone, lipid labs in September

## 2019-05-30 NOTE — PROGRESS NOTES
Subjective     Pascual Perea is a 78 year old male who presents to clinic today for the following health issues:    HPI   Chief Complaint   Patient presents with     Follow Up     for Cardioversion on  05/22/2019 and O.V on 04/25/2019     Pt's past medical history, family history, habits, medications and allergies were reviewed with the patient today.  See snap shot for  HCM status. Most recent lab results reviewed with pt. Problem list and histories reviewed & adjusted, as indicated.  Additional history as below:    Bloods sugars 130-140 in AM  Recent cardioversion for A fib. On Amiodarone now and  Eliquis.  Denies current palpitations. Breathing well. No chest pain or shortness of breath now  On Zantac. No GERD  LE edema much better recently. No orthopnea, PND   Had been off Metformin recently with previous renal insuffiency. Creatinine now acceptable     Lab Results   Component Value Date     05/22/2019     Lab Results   Component Value Date    A1C 7.4 03/14/2019     Lab Results   Component Value Date    CHOL 105 08/13/2018     Lab Results   Component Value Date    LDL 38 08/13/2018     Lab Results   Component Value Date    HDL 32 08/13/2018     Lab Results   Component Value Date    TRIG 175 08/13/2018     Lab Results   Component Value Date    CR 1.24 05/13/2019     Lab Results   Component Value Date    ALT 45 04/25/2019     Lab Results   Component Value Date    AST 20 04/25/2019     Lab Results   Component Value Date    MICROL 18 08/13/2018     Lab Results   Component Value Date    TSH 5.52 04/25/2019       Additional ROS:   Constitutional, HEENT, Cardiovascular, Pulmonary, GI and , Neuro, MSK and Psych review of systems/symptoms are otherwise negative or unchanged from previous, except as noted above.      OBJECTIVE:  /70   Pulse 68   Temp 97.8  F (36.6  C) (Oral)   Resp 16   Wt 89.8 kg (198 lb)   SpO2 97%   BMI 26.85 kg/m      Estimated body mass index is 26.85 kg/m  as calculated from  the following:    Height as of 5/22/19: 1.829 m (6').    Weight as of this encounter: 89.8 kg (198 lb).      Neck: no adenopathy. Thyroid normal to palpation. No bruits  Pulm: Lungs clear to auscultation   CV: Regular rates and rhythm  GI: Soft, nontender, Normal active bowel sounds, No hepatosplenomegaly or masses palpable  Ext: Peripheral pulses intact. Trace BLE edema.  Neuro: Normal strength and tone, sensory exam grossly normal    Assessment/Plan: (See plan discussion below for further details)  1. Type 2 diabetes mellitus without complication, with long-term current use of insulin (H)   Has been off of metformin. Will restart.  Overall A1C OK with age. Recheck July  - metFORMIN (GLUCOPHAGE) 1000 MG tablet; Take 1 tablet (1,000 mg) by mouth 2 times daily (with meals)  Dispense: 180 tablet; Refill: 3  - Hemoglobin A1c; Future  - Basic metabolic panel; Future    2. Hypothyroidism, unspecified type   TSH last mildly elevated  April but was ill back then and normal FT4. Now clinically euthyroid. Will recheck TSH July. If remains elevated, will then increase Levothyroxine  - TSH with free T4 reflex; Future    3. Edema, unspecified type   Improved with Lasix and now out of A fib. Will continue current diuretic for now rather than changing to Torsemide    4. CKD (chronic kidney disease) stage 3, GFR 30-59 ml/min (H)  Recheck July. Improved  - Basic metabolic panel; Future    5. Gastroesophageal reflux disease without esophagitis  Controlled. Will try stopping ranitidine. See below    6. Hyperlipidemia LDL goal <70  On statin. Will recheck September  - Lipid panel reflex to direct LDL Fasting; Future      Plan discussion:   Restart Metformin 1000mg tab, 1 tab twice a day for diabetes  Try reducing Ranitidine to 1 tab daily in AM for heart burn. If heartburn worse, then go back to 1 tab twice a day. If fine with once a day for a few weeks, then can try stopping it to see if still necessary  Continue other meds.  Call   [phone] or use [employer] to schedule a future lab appointment  non-fasting in early/mid July for diabetes and thyroid  Low sodium/salt intake  If future issues with persistent   leg swelling again, contact clinic  Testosterone, lipid labs in September         Andrew Elizalde MD  Internal Medicine Department  [facility]    (Chart documentation was completed, in part, with [employer] voice-recognition software. Even though reviewed, some grammatical, spelling, and word errors may remain.)

## 2019-06-13 ENCOUNTER — OFFICE VISIT (OUTPATIENT)
Dept: CARDIOLOGY | Facility: CLINIC | Age: 79
End: 2019-06-13
Attending: PHYSICIAN ASSISTANT
Payer: COMMERCIAL

## 2019-06-13 ENCOUNTER — HOSPITAL ENCOUNTER (OUTPATIENT)
Dept: GENERAL RADIOLOGY | Facility: CLINIC | Age: 79
Discharge: HOME | End: 2019-06-13
Attending: PHYSICIAN ASSISTANT | Admitting: PHYSICIAN ASSISTANT
Payer: COMMERCIAL

## 2019-06-13 VITALS
SYSTOLIC BLOOD PRESSURE: 132 MMHG | HEIGHT: 71 IN | DIASTOLIC BLOOD PRESSURE: 74 MMHG | HEART RATE: 67 BPM | BODY MASS INDEX: 27.41 KG/M2 | WEIGHT: 195.8 LBS

## 2019-06-13 DIAGNOSIS — I10 BENIGN ESSENTIAL HYPERTENSION: Primary | ICD-10-CM

## 2019-06-13 DIAGNOSIS — E78.5 HYPERLIPIDEMIA LDL GOAL <70: ICD-10-CM

## 2019-06-13 DIAGNOSIS — Z79.899 ON AMIODARONE THERAPY: ICD-10-CM

## 2019-06-13 DIAGNOSIS — I48.19 PERSISTENT ATRIAL FIBRILLATION (H): ICD-10-CM

## 2019-06-13 PROCEDURE — 71046 X-RAY EXAM CHEST 2 VIEWS: CPT

## 2019-06-13 PROCEDURE — 93000 ELECTROCARDIOGRAM COMPLETE: CPT | Performed by: PHYSICIAN ASSISTANT

## 2019-06-13 PROCEDURE — 99214 OFFICE O/P EST MOD 30 MIN: CPT | Performed by: PHYSICIAN ASSISTANT

## 2019-06-13 ASSESSMENT — MIFFLIN-ST. JEOR: SCORE: 1635.27

## 2019-06-13 NOTE — PATIENT INSTRUCTIONS
1. EKG today showed normal rhythm - great news!     2. Continue amiodarone 100 mg daily and Eliquis 5 mg twice daily without interruption    3. Get PFTs (breathing test) as on amiodarone, which we'll keep you as it's working.  Your last blood tests for thyroid and liver were 4/2019 and due again in 10/2019 when you see Dr. Jarvis.    4. CALL if recurrent AFib or any questions/concerns: Balwinder Chin: [phone]

## 2019-06-13 NOTE — LETTER
6/13/2019    Andrew Elizalde MD  [address] [facility] [address]    RE: Pascual Perea       Dear Colleague,    I had the pleasure of seeing Pascual ALEXANDRA Eliazar in the [facility].    HPI:   I had the pleasure of seeing Dayday when he and Kina came for follow up of atrial fibrillation.  He is a 78 year old who sees Dr. Jarvis for his history of:     1. Persistent Atrial Fibrillation first diagnosed 5/2016, s/p DCCV 2016 and again 4/2018. Started on Amiodarone and underwent DCCV 2/2019, which was successful until he was admitted for cholecystectomy 3/2019. He has remained on amiodarone and anticoagulation, though this was briefly interrupted 3/2019 for cholecystectomy (see below). Dr. Jarvis noted we could proceed with PVI vs DCCV.  He opted to proceed with DC cardioversion as the last one had worked until his cholecystectomy. This was done 5/22 and successfully converted him to SR  2. Benign essential hypertension  3. HFpEF, with fluid overload and ascites. EF 55% with mildly decreased RVSF. Low dose Lasix working well  4.  Coronary artery disease He underwent bypass surgery in 1997.  Last stress test 4/2018 showed a small area of inferior ischemia.  Echocardiogram 4/2018 showed a preserved ejection fraction.      I saw Dayday 5/13 at which time we discussed proceeding with DCCV, as he felt quite a bit better when in SR.  I decreased Amiodarone to 100 mg daily given his bradycardia. DCCV 5/22 with Dr. Richard was successful and he was discharged without medication changes.    Since then done well. No recurrent AFib that he's aware of. Good stamina.   No complaints of chest pain, pressure or tightness.  No orthopnea, PND or edema. No change in exercise tolerance or breathing. Overall, he is feeling well.     EKG today, which I overread, showed SR 68 bpm with long 1st degree AV Block. LVH with strain.    Echocardiogram 4/25/2019 EF 55% with mildly decreased right ventricular systolic function.   Mild aortic stenosis with a mean gradient of 9 mmHg and an aortic valve area 1.7 cm .  1+ mitral regurgitation was noted.  Left atrium was dilated at 4.6 cm with a volume index of 51.8 mL/m .  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      Assessment & Plan:    1. Persistent Atrial Fibrillation with preserved EF    Most recent DCCV 5/22/2019 initially successful. Has been on Amio since 1/2019 and tolerating without issues      PLAN:    Amiodarone labs drawn 4/2019 and due again 10/2019 with Dr. Jarvis appt    Get baseline PFTs and CXR    Continue warfarin      2. HFpEF    Echo 4/2019 with EF 55% and mildly reduced RVSF. Mild aortic stenosis and mild MR    Currently on furosemide 20 mg daily    Euvolemic on exam    PLAN:    No changes. We could switch him to torsemide if fluid becomes an issue    3. Benign essential HTN    BP too high when first came in (he woke up late and was rushing), improved on recheck    PLAN:    Continue furosemide 20 mg daily, lisinopril 30 mg daily    Uptitrate lisinopril if needed. Caution with BB given bradycardia on amiodarone.     Joleen Marrero PA-C, MSPAS      Orders Placed This Encounter   Procedures     XR Chest 2 Views     Hepatic panel     TSH with free T4 reflex     Basic metabolic panel     Follow-Up with Electrophysiologist     EKG 12-lead complete w/read - Clinics (performed today)     Pulmonary function test     No orders of the defined types were placed in this encounter.    There are no discontinued medications.      Encounter Diagnoses   Name Primary?     Persistent atrial fibrillation (H)      Benign essential hypertension Yes     Hyperlipidemia LDL goal <70      On amiodarone therapy        CURRENT MEDICATIONS:  Current Outpatient Medications   Medication Sig Dispense Refill     amiodarone (PACERONE/CODARONE) 200 MG tablet Take 0.5 tablets (100 mg) by mouth every morning       apixaban ANTICOAGULANT (ELIQUIS) 5 MG tablet Take 1 tablet (5 mg) by mouth 2 times daily 60 tablet 11      Cholecalciferol (VITAMIN D3 PO) Take 5,000 Units by mouth daily       fexofenadine (ALLEGRA) 180 MG tablet Take 1 tablet (180 mg) by mouth daily 90 tablet 3     fluticasone (FLONASE) 50 MCG/ACT spray USE ONE TO TWO SPRAYS IN EACH NOSTRIL EVERY DAY 48 g 3     furosemide (LASIX) 20 MG tablet Take 1 tablet (20 mg) by mouth daily 30 tablet 3     GLUCOSE 4 GM OR CHEW as directed 30 11     insulin aspart (NOVOLOG FLEXPEN) 100 UNIT/ML pen Inject 3 times daily (before meals). 2 units per carb choice (15gm). Approx 5-10 units/meal. 30 mL 3     insulin detemir (LEVEMIR FLEXPEN/FLEXTOUCH) 100 UNIT/ML pen Inject 35-40 Units Subcutaneous At Bedtime (Patient taking differently: Inject 35 Units Subcutaneous At Bedtime ) 45 mL 3     levothyroxine (SYNTHROID/LEVOTHROID) 125 MCG tablet TAKE ONE TABLET BY MOUTH EVERY DAY 90 tablet 1     lisinopril (PRINIVIL/ZESTRIL) 30 MG tablet Take 1 tablet (30 mg) by mouth daily 90 tablet 3     metFORMIN (GLUCOPHAGE) 1000 MG tablet Take 1 tablet (1,000 mg) by mouth 2 times daily (with meals) 180 tablet 3     pravastatin (PRAVACHOL) 80 MG tablet Take 1 tablet (80 mg) by mouth At Bedtime       ranitidine (ZANTAC) 150 MG tablet TAKE ONE TABLET BY MOUTH TWICE A  tablet 2     testosterone cypionate (DEPOTESTOSTERONE) 200 MG/ML injection Inject 1.5 mLs (300 mg) into the muscle every 14 days 10 mL 1     nitroglycerin (NITROSTAT) 0.4 MG SL tablet Place 1 tablet (0.4 mg) under the tongue every 5 minutes as needed for chest pain (Patient not taking: Reported on 6/13/2019) 25 tablet 1       ALLERGIES     Allergies   Allergen Reactions     Omeprazole      diarrhea       PAST MEDICAL HISTORY:  Past Medical History:   Diagnosis Date     Adhesive capsulitis of shoulder      Anemia 3/10/2014     Anemia, unspecified      Antiplatelet or antithrombotic long-term use      Arrhythmia     Atrial fib/flutter     CAD (coronary artery disease)      History of cardioversion 04/24/2018    a single synchronized shock  of 120 joules restored normal sinus rhythm     History of cardioversion 02/13/2019    single shock , 200 joules successul in restoring NSR     Hyperlipidemia LDL goal <70 5/26/2011     Hypertension      Hypothyroidism      Impotence of organic origin      Laceration of finger  June 2010     left thumb s/p sutures     Numbness and tingling     diabetic neuropathy bilateral feet     BRANDON (obstructive sleep apnea)     intolerant of CPAP, uses it occasionally.  Using mandibular device occasionally     Osteopenia      Pulmonary hypertension (H) 4/6/2012     Sensorineural hearing loss, unspecified      Stented coronary artery 1997    CABG      Testosterone deficiency      Type 2 diabetes mellitus without complication  (goal A1C<8) 10/24/2015     Typical atrial flutter (H) 4/18/16     Unspecified hypothyroidism      Vitamin D deficiency 9/3/2011       PAST SURGICAL HISTORY:  Past Surgical History:   Procedure Laterality Date     ANESTHESIA CARDIOVERSION N/A 2/13/2019    Procedure: ANESTHESIA CARDIOVERSION;  Surgeon: GENERIC ANESTHESIA PROVIDER;  Location:  OR     ANESTHESIA CARDIOVERSION N/A 5/22/2019    Procedure: ANESTHESIA, FOR CARDIOVERSION;  Surgeon: GENERIC ANESTHESIA PROVIDER;  Location:  OR     C NONSPECIFIC PROCEDURE  1995    CABG ([facility])     C NONSPECIFIC PROCEDURE  8/01    stress echo     CARDIAC SURGERY      bypass in 1997     CARDIOVERSION  04/24/2018     COLONOSCOPY       CORONARY ANGIOGRAPHY ADULT ORDER      4/2/2012     CORONARY ARTERY BYPASS  1997    [facility]     ENDOSCOPIC ULTRASOUND UPPER GASTROINTESTINAL TRACT (GI) N/A 3/14/2019    Procedure: ENDOSCOPIC ULTRASOUND OF UPPER GASTROINTESTINAL TRACT;  Surgeon: Ju Torres MD;  Location:  OR     EXCISE MASS HEAD Left 8/18/2016    Procedure: EXCISE MASS HEAD;  Surgeon: Ivan Hernandez MD;  Location:  SD     HEART CATH, ANGIOPLASTY  4/2012     LAPAROSCOPIC CHOLECYSTECTOMY N/A 3/17/2019    Procedure:  LAPAROSCOPIC CHOLECYSTECTOMY;  Surgeon: Janel Paz MD;  Location:  OR     PHACOEMULSIFICATION CLEAR CORNEA WITH STANDARD INTRAOCULAR LENS IMPLANT Left 2015    Procedure: PHACOEMULSIFICATION CLEAR CORNEA WITH STANDARD INTRAOCULAR LENS IMPLANT;  Surgeon: Nixon Farnsworth MD;  Location:  [facility]     PHACOEMULSIFICATION CLEAR CORNEA WITH STANDARD INTRAOCULAR LENS IMPLANT Right 2015    Procedure: PHACOEMULSIFICATION CLEAR CORNEA WITH STANDARD INTRAOCULAR LENS IMPLANT;  Surgeon: Nixon Farnsworth MD;  Location:  EC     SEPTOPLASTY, TURBINOPLASTY, COMBINED Bilateral 2016    Procedure: COMBINED SEPTOPLASTY, TURBINOPLASTY;  Surgeon: Ivan Hernandez MD;  Location:  SD       FAMILY HISTORY:  Family History   Problem Relation Age of Onset     Genitourinary Problems Father         d: age 89; ARF     Hypertension Father      Diabetes Mother         d: age 68, CAD     Heart Disease Mother         MI     Myocardial Infarction Mother      Cardiovascular Brother         d:  age 31; CAD     Heart Disease Brother         age 31, MI     Diabetes Sister         b:  1939; DM2       SOCIAL HISTORY:  Social History     Socioeconomic History     Marital status:      Spouse name: None     Number of children: None     Years of education: None     Highest education level: None   Occupational History     None   Social Needs     Financial resource strain: None     Food insecurity:     Worry: None     Inability: None     Transportation needs:     Medical: None     Non-medical: None   Tobacco Use     Smoking status: Former Smoker     Packs/day: 1.00     Years: 6.00     Pack years: 6.00     Start date:      Last attempt to quit: 1964     Years since quittin.9     Smokeless tobacco: Never Used   Substance and Sexual Activity     Alcohol use: Yes     Comment: couple drinks a year     Drug use: No     Sexual activity: Yes     Partners: Female   Lifestyle     Physical activity:     Days per week:  "None     Minutes per session: None     Stress: None   Relationships     Social connections:     Talks on phone: None     Gets together: None     Attends Jainism service: None     Active member of club or organization: None     Attends meetings of clubs or organizations: None     Relationship status: None     Intimate partner violence:     Fear of current or ex partner: None     Emotionally abused: None     Physically abused: None     Forced sexual activity: None   Other Topics Concern     Parent/sibling w/ CABG, MI or angioplasty before 65F 55M? Not Asked      Service Not Asked     Blood Transfusions Not Asked     Caffeine Concern Yes     Comment: 2 cups coffee a day     Occupational Exposure Not Asked     Hobby Hazards Not Asked     Sleep Concern Yes     Comment: sleep apnea, wears cpap off and on     Stress Concern No     Weight Concern No     Special Diet Yes     Comment: diabetic diet      Back Care Not Asked     Exercise No     Comment: occ walk the mall     Bike Helmet Not Asked     Seat Belt Yes     Self-Exams Not Asked   Social History Narrative     None       Review of Systems:  Skin:  Negative     Eyes:  Positive for glasses  ENT:  Negative    Respiratory:  Negative for shortness of breath;dyspnea on exertion;cough  Cardiovascular:  Negative for;palpitations;chest pain;dizziness;lightheadedness;fatigue;exercise intolerance    Gastroenterology: Negative for melena;hematochezia  Genitourinary:  Negative    Musculoskeletal:  Negative    Neurologic:  Positive for numbness or tingling of feet  Psychiatric:  Negative    Heme/Lymph/Imm:  Negative    Endocrine:  Positive for thyroid disorder;diabetes;night sweats    Physical Exam:  Vitals: /74   Pulse 67   Ht 1.803 m (5' 11\")   Wt 88.8 kg (195 lb 12.8 oz)   BMI 27.31 kg/m       Constitutional:  cooperative, alert and oriented, well developed, well nourished, in no acute distress        Skin:  warm and dry to the touch        Head:  " normocephalic, no masses or lesions        Eyes:  pupils equal and round;sclera white;conjunctivae and lids unremarkable        ENT:  no pallor or cyanosis, dentition good        Neck:  JVP normal        Chest:  normal breath sounds, clear to auscultation, normal A-P diameter, normal symmetry, normal respiratory excursion, no use of accessory muscles        Cardiac: normal S1 and S2;regular rhythm       systolic ejection murmur          Abdomen:  BS normoactive obese Firm belly. BS present.     Vascular: pulses full and equal                                      Extremities and Back:  no deformities, clubbing, cyanosis, erythema observed;no edema        Neurological:  no gross motor deficits          Recent Lab Results:  LIPID RESULTS:  Lab Results   Component Value Date    CHOL 105 08/13/2018    HDL 32 (L) 08/13/2018    LDL 38 08/13/2018    TRIG 175 (H) 08/13/2018    CHOLHDLRATIO 3.3 05/08/2015       LIVER ENZYME RESULTS:  Lab Results   Component Value Date    AST 20 04/25/2019    ALT 45 04/25/2019       CBC RESULTS:  Lab Results   Component Value Date    WBC 6.5 03/22/2019    RBC 5.12 03/22/2019    HGB 15.5 04/19/2019    HCT 44.4 03/22/2019    MCV 87 03/22/2019    MCH 28.3 03/22/2019    MCHC 32.7 03/22/2019    RDW 16.2 (H) 03/22/2019     03/22/2019       BMP RESULTS:  Lab Results   Component Value Date     05/13/2019    POTASSIUM 4.3 05/22/2019    CHLORIDE 100 05/13/2019    CO2 30 (H) 05/13/2019    ANIONGAP 11.7 05/13/2019     (H) 05/22/2019    BUN 12 05/13/2019    CR 1.24 05/13/2019    GFRESTIMATED 57 (L) 05/13/2019    GFRESTBLACK 68 05/13/2019    STARR 10.5 05/13/2019        A1C RESULTS:  Lab Results   Component Value Date    A1C 7.4 (H) 03/14/2019       INR RESULTS:  Lab Results   Component Value Date    INR 1.43 (H) 05/22/2019    INR 1.09 03/17/2019               Thank you for allowing me to participate in the care of your patient.    Sincerely,     Rebekah Marrero PA-C      [facility]

## 2019-06-13 NOTE — PROGRESS NOTES
HPI:   I had the pleasure of seeing Dayday when he and Kina came for follow up of atrial fibrillation.  He is a 78 year old who sees Dr. Jarvis for his history of:     1. Persistent Atrial Fibrillation first diagnosed 5/2016, s/p DCCV 2016 and again 4/2018. Started on Amiodarone and underwent DCCV 2/2019, which was successful until he was admitted for cholecystectomy 3/2019. He has remained on amiodarone and anticoagulation, though this was briefly interrupted 3/2019 for cholecystectomy (see below). Dr. Jarvis noted we could proceed with PVI vs DCCV.  He opted to proceed with DC cardioversion as the last one had worked until his cholecystectomy. This was done 5/22 and successfully converted him to SR  2. Benign essential hypertension  3. HFpEF, with fluid overload and ascites. EF 55% with mildly decreased RVSF. Low dose Lasix working well  4.  Coronary artery disease He underwent bypass surgery in 1997.  Last stress test 4/2018 showed a small area of inferior ischemia.  Echocardiogram 4/2018 showed a preserved ejection fraction.      I saw Dayday 5/13 at which time we discussed proceeding with DCCV, as he felt quite a bit better when in SR.  I decreased Amiodarone to 100 mg daily given his bradycardia. DCCV 5/22 with Dr. Richard was successful and he was discharged without medication changes.    Since then done well. No recurrent AFib that he's aware of. Good stamina.   No complaints of chest pain, pressure or tightness.  No orthopnea, PND or edema. No change in exercise tolerance or breathing. Overall, he is feeling well.     EKG today, which I overread, showed SR 68 bpm with long 1st degree AV Block. LVH with strain.    Echocardiogram 4/25/2019 EF 55% with mildly decreased right ventricular systolic function.  Mild aortic stenosis with a mean gradient of 9 mmHg and an aortic valve area 1.7 cm .  1+ mitral regurgitation was noted.  Left atrium was dilated at 4.6 cm with a volume index of 51.8 mL/m .  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      Assessment & Plan:    1. Persistent Atrial Fibrillation with preserved EF    Most recent DCCV 5/22/2019 initially successful. Has been on Amio since 1/2019 and tolerating without issues      PLAN:    Amiodarone labs drawn 4/2019 and due again 10/2019 with Dr. Jarvis appt    Get baseline PFTs and CXR    Continue warfarin      2. HFpEF    Echo 4/2019 with EF 55% and mildly reduced RVSF. Mild aortic stenosis and mild MR    Currently on furosemide 20 mg daily    Euvolemic on exam    PLAN:    No changes. We could switch him to torsemide if fluid becomes an issue    3. Benign essential HTN    BP too high when first came in (he woke up late and was rushing), improved on recheck    PLAN:    Continue furosemide 20 mg daily, lisinopril 30 mg daily    Uptitrate lisinopril if needed. Caution with BB given bradycardia on amiodarone.     Joleen Marrero PA-C, MSPAS      Orders Placed This Encounter   Procedures     XR Chest 2 Views     Hepatic panel     TSH with free T4 reflex     Basic metabolic panel     Follow-Up with Electrophysiologist     EKG 12-lead complete w/read - Clinics (performed today)     Pulmonary function test     No orders of the defined types were placed in this encounter.    There are no discontinued medications.      Encounter Diagnoses   Name Primary?     Persistent atrial fibrillation (H)      Benign essential hypertension Yes     Hyperlipidemia LDL goal <70      On amiodarone therapy        CURRENT MEDICATIONS:  Current Outpatient Medications   Medication Sig Dispense Refill     amiodarone (PACERONE/CODARONE) 200 MG tablet Take 0.5 tablets (100 mg) by mouth every morning       apixaban ANTICOAGULANT (ELIQUIS) 5 MG tablet Take 1 tablet (5 mg) by mouth 2 times daily 60 tablet 11     Cholecalciferol (VITAMIN D3 PO) Take 5,000 Units by mouth daily       fexofenadine (ALLEGRA) 180 MG tablet Take 1 tablet (180 mg) by mouth daily 90 tablet 3     fluticasone (FLONASE) 50 MCG/ACT spray USE ONE TO TWO SPRAYS  IN EACH NOSTRIL EVERY DAY 48 g 3     furosemide (LASIX) 20 MG tablet Take 1 tablet (20 mg) by mouth daily 30 tablet 3     GLUCOSE 4 GM OR CHEW as directed 30 11     insulin aspart (NOVOLOG FLEXPEN) 100 UNIT/ML pen Inject 3 times daily (before meals). 2 units per carb choice (15gm). Approx 5-10 units/meal. 30 mL 3     insulin detemir (LEVEMIR FLEXPEN/FLEXTOUCH) 100 UNIT/ML pen Inject 35-40 Units Subcutaneous At Bedtime (Patient taking differently: Inject 35 Units Subcutaneous At Bedtime ) 45 mL 3     levothyroxine (SYNTHROID/LEVOTHROID) 125 MCG tablet TAKE ONE TABLET BY MOUTH EVERY DAY 90 tablet 1     lisinopril (PRINIVIL/ZESTRIL) 30 MG tablet Take 1 tablet (30 mg) by mouth daily 90 tablet 3     metFORMIN (GLUCOPHAGE) 1000 MG tablet Take 1 tablet (1,000 mg) by mouth 2 times daily (with meals) 180 tablet 3     pravastatin (PRAVACHOL) 80 MG tablet Take 1 tablet (80 mg) by mouth At Bedtime       ranitidine (ZANTAC) 150 MG tablet TAKE ONE TABLET BY MOUTH TWICE A  tablet 2     testosterone cypionate (DEPOTESTOSTERONE) 200 MG/ML injection Inject 1.5 mLs (300 mg) into the muscle every 14 days 10 mL 1     nitroglycerin (NITROSTAT) 0.4 MG SL tablet Place 1 tablet (0.4 mg) under the tongue every 5 minutes as needed for chest pain (Patient not taking: Reported on 6/13/2019) 25 tablet 1       ALLERGIES     Allergies   Allergen Reactions     Omeprazole      diarrhea       PAST MEDICAL HISTORY:  Past Medical History:   Diagnosis Date     Adhesive capsulitis of shoulder      Anemia 3/10/2014     Anemia, unspecified      Antiplatelet or antithrombotic long-term use      Arrhythmia     Atrial fib/flutter     CAD (coronary artery disease)      History of cardioversion 04/24/2018    a single synchronized shock of 120 joules restored normal sinus rhythm     History of cardioversion 02/13/2019    single shock , 200 joules successul in restoring NSR     Hyperlipidemia LDL goal <70 5/26/2011     Hypertension      Hypothyroidism       Impotence of organic origin      Laceration of finger  June 2010     left thumb s/p sutures     Numbness and tingling     diabetic neuropathy bilateral feet     BRANDON (obstructive sleep apnea)     intolerant of CPAP, uses it occasionally.  Using mandibular device occasionally     Osteopenia      Pulmonary hypertension (H) 4/6/2012     Sensorineural hearing loss, unspecified      Stented coronary artery 1997    CABG      Testosterone deficiency      Type 2 diabetes mellitus without complication  (goal A1C<8) 10/24/2015     Typical atrial flutter (H) 4/18/16     Unspecified hypothyroidism      Vitamin D deficiency 9/3/2011       PAST SURGICAL HISTORY:  Past Surgical History:   Procedure Laterality Date     ANESTHESIA CARDIOVERSION N/A 2/13/2019    Procedure: ANESTHESIA CARDIOVERSION;  Surgeon: GENERIC ANESTHESIA PROVIDER;  Location:  OR     ANESTHESIA CARDIOVERSION N/A 5/22/2019    Procedure: ANESTHESIA, FOR CARDIOVERSION;  Surgeon: GENERIC ANESTHESIA PROVIDER;  Location:  OR     C NONSPECIFIC PROCEDURE  1995    CABG ([employer])     C NONSPECIFIC PROCEDURE  8/01    stress echo     CARDIAC SURGERY      bypass in 1997     CARDIOVERSION  04/24/2018     COLONOSCOPY       CORONARY ANGIOGRAPHY ADULT ORDER      4/2/2012     CORONARY ARTERY BYPASS  1997    [facility]     ENDOSCOPIC ULTRASOUND UPPER GASTROINTESTINAL TRACT (GI) N/A 3/14/2019    Procedure: ENDOSCOPIC ULTRASOUND OF UPPER GASTROINTESTINAL TRACT;  Surgeon: Ju Torres MD;  Location:  OR     EXCISE MASS HEAD Left 8/18/2016    Procedure: EXCISE MASS HEAD;  Surgeon: Ivan Hernandez MD;  Location:  [facility]     HEART CATH, ANGIOPLASTY  4/2012     LAPAROSCOPIC CHOLECYSTECTOMY N/A 3/17/2019    Procedure: LAPAROSCOPIC CHOLECYSTECTOMY;  Surgeon: Janel Paz MD;  Location:  OR     PHACOEMULSIFICATION CLEAR CORNEA WITH STANDARD INTRAOCULAR LENS IMPLANT Left 6/8/2015    Procedure: PHACOEMULSIFICATION CLEAR CORNEA WITH STANDARD  INTRAOCULAR LENS IMPLANT;  Surgeon: Nixon Farnsworth MD;  Location:  [facility]     PHACOEMULSIFICATION CLEAR CORNEA WITH STANDARD INTRAOCULAR LENS IMPLANT Right 2015    Procedure: PHACOEMULSIFICATION CLEAR CORNEA WITH STANDARD INTRAOCULAR LENS IMPLANT;  Surgeon: Nixon Farnsworth MD;  Location:  [facility]     SEPTOPLASTY, TURBINOPLASTY, COMBINED Bilateral 2016    Procedure: COMBINED SEPTOPLASTY, TURBINOPLASTY;  Surgeon: Ivan Hernandez MD;  Location: [facility]       FAMILY HISTORY:  Family History   Problem Relation Age of Onset     Genitourinary Problems Father         d: age 89; ARF     Hypertension Father      Diabetes Mother         d: age 68, CAD     Heart Disease Mother         MI     Myocardial Infarction Mother      Cardiovascular Brother         d:  age 31; CAD     Heart Disease Brother         age 31, MI     Diabetes Sister         b:  1939; DM2       SOCIAL HISTORY:  Social History     Socioeconomic History     Marital status:      Spouse name: None     Number of children: None     Years of education: None     Highest education level: None   Occupational History     None   Social Needs     Financial resource strain: None     Food insecurity:     Worry: None     Inability: None     Transportation needs:     Medical: None     Non-medical: None   Tobacco Use     Smoking status: Former Smoker     Packs/day: 1.00     Years: 6.00     Pack years: 6.00     Start date:      Last attempt to quit: 1964     Years since quittin.9     Smokeless tobacco: Never Used   Substance and Sexual Activity     Alcohol use: Yes     Comment: couple drinks a year     Drug use: No     Sexual activity: Yes     Partners: Female   Lifestyle     Physical activity:     Days per week: None     Minutes per session: None     Stress: None   Relationships     Social connections:     Talks on phone: None     Gets together: None     Attends Sikhism service: None     Active member of club or organization: None      "Attends meetings of clubs or organizations: None     Relationship status: None     Intimate partner violence:     Fear of current or ex partner: None     Emotionally abused: None     Physically abused: None     Forced sexual activity: None   Other Topics Concern     Parent/sibling w/ CABG, MI or angioplasty before 65F 55M? Not Asked      Service Not Asked     Blood Transfusions Not Asked     Caffeine Concern Yes     Comment: 2 cups coffee a day     Occupational Exposure Not Asked     Hobby Hazards Not Asked     Sleep Concern Yes     Comment: sleep apnea, wears cpap off and on     Stress Concern No     Weight Concern No     Special Diet Yes     Comment: diabetic diet      Back Care Not Asked     Exercise No     Comment: occ walk the mall     Bike Helmet Not Asked     Seat Belt Yes     Self-Exams Not Asked   Social History Narrative     None       Review of Systems:  Skin:  Negative     Eyes:  Positive for glasses  ENT:  Negative    Respiratory:  Negative for shortness of breath;dyspnea on exertion;cough  Cardiovascular:  Negative for;palpitations;chest pain;dizziness;lightheadedness;fatigue;exercise intolerance    Gastroenterology: Negative for melena;hematochezia  Genitourinary:  Negative    Musculoskeletal:  Negative    Neurologic:  Positive for numbness or tingling of feet  Psychiatric:  Negative    Heme/Lymph/Imm:  Negative    Endocrine:  Positive for thyroid disorder;diabetes;night sweats    Physical Exam:  Vitals: /74   Pulse 67   Ht 1.803 m (5' 11\")   Wt 88.8 kg (195 lb 12.8 oz)   BMI 27.31 kg/m      Constitutional:  cooperative, alert and oriented, well developed, well nourished, in no acute distress        Skin:  warm and dry to the touch        Head:  normocephalic, no masses or lesions        Eyes:  pupils equal and round;sclera white;conjunctivae and lids unremarkable        ENT:  no pallor or cyanosis, dentition good        Neck:  JVP normal        Chest:  normal breath sounds, clear to " auscultation, normal A-P diameter, normal symmetry, normal respiratory excursion, no use of accessory muscles        Cardiac: normal S1 and S2;regular rhythm       systolic ejection murmur          Abdomen:  BS normoactive obese Firm belly. BS present.     Vascular: pulses full and equal                                      Extremities and Back:  no deformities, clubbing, cyanosis, erythema observed;no edema        Neurological:  no gross motor deficits          Recent Lab Results:  LIPID RESULTS:  Lab Results   Component Value Date    CHOL 105 08/13/2018    HDL 32 (L) 08/13/2018    LDL 38 08/13/2018    TRIG 175 (H) 08/13/2018    CHOLHDLRATIO 3.3 05/08/2015       LIVER ENZYME RESULTS:  Lab Results   Component Value Date    AST 20 04/25/2019    ALT 45 04/25/2019       CBC RESULTS:  Lab Results   Component Value Date    WBC 6.5 03/22/2019    RBC 5.12 03/22/2019    HGB 15.5 04/19/2019    HCT 44.4 03/22/2019    MCV 87 03/22/2019    MCH 28.3 03/22/2019    MCHC 32.7 03/22/2019    RDW 16.2 (H) 03/22/2019     03/22/2019       BMP RESULTS:  Lab Results   Component Value Date     05/13/2019    POTASSIUM 4.3 05/22/2019    CHLORIDE 100 05/13/2019    CO2 30 (H) 05/13/2019    ANIONGAP 11.7 05/13/2019     (H) 05/22/2019    BUN 12 05/13/2019    CR 1.24 05/13/2019    GFRESTIMATED 57 (L) 05/13/2019    GFRESTBLACK 68 05/13/2019    STARR 10.5 05/13/2019        A1C RESULTS:  Lab Results   Component Value Date    A1C 7.4 (H) 03/14/2019       INR RESULTS:  Lab Results   Component Value Date    INR 1.43 (H) 05/22/2019    INR 1.09 03/17/2019

## 2019-07-09 ENCOUNTER — HOSPITAL ENCOUNTER (OUTPATIENT)
Dept: CARDIAC REHAB | Facility: CLINIC | Age: 79
End: 2019-07-09
Attending: PHYSICIAN ASSISTANT
Payer: COMMERCIAL

## 2019-07-09 DIAGNOSIS — Z79.899 ON AMIODARONE THERAPY: ICD-10-CM

## 2019-07-09 DIAGNOSIS — I48.19 PERSISTENT ATRIAL FIBRILLATION (H): ICD-10-CM

## 2019-07-09 PROCEDURE — 94726 PLETHYSMOGRAPHY LUNG VOLUMES: CPT

## 2019-07-09 PROCEDURE — 94375 RESPIRATORY FLOW VOLUME LOOP: CPT

## 2019-07-09 PROCEDURE — 40001038 ZZH STATISTIC RESPIRATORY TESTING VISIT

## 2019-07-09 PROCEDURE — 94729 DIFFUSING CAPACITY: CPT

## 2019-07-23 ENCOUNTER — TELEPHONE (OUTPATIENT)
Dept: CARDIOLOGY | Facility: CLINIC | Age: 79
End: 2019-07-23

## 2019-07-23 LAB
DLCOUNC-%PRED-PRE: 94 %
DLCOUNC-PRE: 24.25 ML/MIN/MMHG
DLCOUNC-PRED: 25.6 ML/MIN/MMHG
ERV-%PRED-PRE: 56 %
ERV-PRE: 0.58 L
ERV-PRED: 1.02 L
EXPTIME-PRE: 6.45 SEC
FEF2575-%PRED-PRE: 193 %
FEF2575-PRE: 4.25 L/SEC
FEF2575-PRED: 2.2 L/SEC
FEFMAX-%PRED-PRE: 117 %
FEFMAX-PRE: 9.21 L/SEC
FEFMAX-PRED: 7.83 L/SEC
FEV1-%PRED-PRE: 97 %
FEV1-PRE: 2.99 L
FEV1FEV6-PRE: 86 %
FEV1FEV6-PRED: 77 %
FEV1FVC-PRE: 86 %
FEV1FVC-PRED: 75 %
FEV1SVC-PRE: 86 %
FEV1SVC-PRED: 64 %
FIFMAX-PRE: 3.31 L/SEC
FRCPLETH-%PRED-PRE: 83 %
FRCPLETH-PRE: 3.2 L
FRCPLETH-PRED: 3.82 L
FVC-%PRED-PRE: 83 %
FVC-PRE: 3.47 L
FVC-PRED: 4.13 L
IC-%PRED-PRE: 77 %
IC-PRE: 2.92 L
IC-PRED: 3.75 L
RVPLETH-%PRED-PRE: 92 %
RVPLETH-PRE: 2.62 L
RVPLETH-PRED: 2.83 L
TLCPLETH-%PRED-PRE: 83 %
TLCPLETH-PRE: 6.12 L
TLCPLETH-PRED: 7.33 L
VA-%PRED-PRE: 86 %
VA-PRE: 5.68 L
VC-%PRED-PRE: 73 %
VC-PRE: 3.5 L
VC-PRED: 4.78 L

## 2019-07-23 NOTE — TELEPHONE ENCOUNTER
Called patient and left a voicemail regarding the results below. Asked that he call to set up an appointment with Dr. Jarvis and labs in October.     ----- Message from Rebekah Marrero PA-C sent at 7/23/2019 10:04 AM CDT -----  Pls let pt know CXR and PFTs on Amiodarone (baseline) OK. Due for Amio labs and QP appt 10/2019 (not scheduled)

## 2019-08-22 NOTE — TELEPHONE ENCOUNTER
Attempted to call patient again today 8/22/19 to encourage him to schedule follow up with Dr. Jarvis and labs in October as this is not yet scheduled. Left voicemail for patient.

## 2019-08-27 DIAGNOSIS — R60.9 EDEMA, UNSPECIFIED TYPE: ICD-10-CM

## 2019-08-27 RX ORDER — FUROSEMIDE 20 MG
TABLET ORAL
Qty: 30 TABLET | Refills: 9 | Status: SHIPPED | OUTPATIENT
Start: 2019-08-27 | End: 2020-07-08

## 2019-08-27 NOTE — TELEPHONE ENCOUNTER
"Requested Prescriptions   Pending Prescriptions Disp Refills     furosemide (LASIX) 20 MG tablet [Pharmacy Med Name: FUROSEMIDE 20MG TABS] 30 tablet 2     Sig: TAKE ONE TABLET BY MOUTH ONCE DAILY       Diuretics (Including Combos) Protocol Passed - 8/27/2019  6:59 AM        Passed - Blood pressure under 140/90 in past 12 months     BP Readings from Last 3 Encounters:   06/13/19 132/74   05/30/19 126/70   05/22/19 (!) 159/96                 Passed - Recent (12 mo) or future (30 days) visit within the authorizing provider's specialty     Patient had office visit in the last 12 months or has a visit in the next 30 days with authorizing provider or within the authorizing provider's specialty.  See \"Patient Info\" tab in inbasket, or \"Choose Columns\" in Meds & Orders section of the refill encounter.              Passed - Medication is active on med list        Passed - Patient is age 18 or older        Passed - Normal serum creatinine on file in past 12 months     Recent Labs   Lab Test 05/13/19  1216   CR 1.24              Passed - Normal serum potassium on file in past 12 months     Recent Labs   Lab Test 05/22/19  0915   POTASSIUM 4.3                    Passed - Normal serum sodium on file in past 12 months     Recent Labs   Lab Test 05/13/19  1216                   "

## 2019-09-23 DIAGNOSIS — I10 ESSENTIAL HYPERTENSION, BENIGN: ICD-10-CM

## 2019-09-23 RX ORDER — LISINOPRIL 30 MG/1
30 TABLET ORAL DAILY
Qty: 90 TABLET | Refills: 3 | Status: SHIPPED | OUTPATIENT
Start: 2019-09-23 | End: 2020-04-14 | Stop reason: ALTCHOICE

## 2019-09-25 DIAGNOSIS — J30.9 CHRONIC ALLERGIC RHINITIS: ICD-10-CM

## 2019-09-25 RX ORDER — FLUTICASONE PROPIONATE 50 MCG
SPRAY, SUSPENSION (ML) NASAL
Qty: 48 G | Refills: 3 | Status: SHIPPED | OUTPATIENT
Start: 2019-09-25 | End: 2019-10-12 | Stop reason: ALTCHOICE

## 2019-09-25 NOTE — TELEPHONE ENCOUNTER
"Requested Prescriptions   Pending Prescriptions Disp Refills     fluticasone (FLONASE) 50 MCG/ACT nasal spray [Pharmacy Med Name: FLUTICASONE 50MCG NASAL SPRAY] 48 g 3     Sig: USE ONE TO TWO SPRAYS IN EACH NOSTRIL EVERY DAY       Inhaled Steroids Protocol Passed - 9/25/2019 12:41 PM        Passed - Patient is age 12 or older        Passed - Recent (12 mo) or future (30 days) visit within the authorizing provider's specialty     Patient had office visit in the last 12 months or has a visit in the next 30 days with authorizing provider or within the authorizing provider's specialty.  See \"Patient Info\" tab in inbasket, or \"Choose Columns\" in Meds & Orders section of the refill encounter.              Passed - Medication is active on med list        Last Written Prescription Date:  1/31/18  Last Fill Quantity: 48g,  # refills: 3   Last office visit: 5/30/2019 with prescribing provider:  PCP   Future Office Visit:   Next 5 appointments (look out 90 days)    Oct 11, 2019 11:00 AM CDT  PHYSICAL with Andrew Elizalde MD  [facility] ([facility] [address] [address] [phone]           "

## 2019-09-30 DIAGNOSIS — E03.9 HYPOTHYROIDISM, UNSPECIFIED TYPE: ICD-10-CM

## 2019-09-30 DIAGNOSIS — E34.9 TESTOSTERONE DEFICIENCY: ICD-10-CM

## 2019-09-30 DIAGNOSIS — Z79.4 TYPE 2 DIABETES MELLITUS WITHOUT COMPLICATION, WITH LONG-TERM CURRENT USE OF INSULIN (H): ICD-10-CM

## 2019-09-30 DIAGNOSIS — Z12.5 SCREENING FOR MALIGNANT NEOPLASM OF PROSTATE: ICD-10-CM

## 2019-09-30 DIAGNOSIS — N18.30 CKD (CHRONIC KIDNEY DISEASE) STAGE 3, GFR 30-59 ML/MIN (H): ICD-10-CM

## 2019-09-30 DIAGNOSIS — E11.9 TYPE 2 DIABETES MELLITUS WITHOUT COMPLICATION, WITH LONG-TERM CURRENT USE OF INSULIN (H): ICD-10-CM

## 2019-09-30 DIAGNOSIS — E78.5 HYPERLIPIDEMIA LDL GOAL <70: ICD-10-CM

## 2019-09-30 LAB
ALBUMIN SERPL-MCNC: 3.6 G/DL (ref 3.4–5)
ALP SERPL-CCNC: 95 U/L (ref 40–150)
ALT SERPL W P-5'-P-CCNC: 39 U/L (ref 0–70)
ANION GAP SERPL CALCULATED.3IONS-SCNC: 3 MMOL/L (ref 3–14)
AST SERPL W P-5'-P-CCNC: 22 U/L (ref 0–45)
BILIRUB SERPL-MCNC: 1.2 MG/DL (ref 0.2–1.3)
BUN SERPL-MCNC: 11 MG/DL (ref 7–30)
CALCIUM SERPL-MCNC: 8.9 MG/DL (ref 8.5–10.1)
CHLORIDE SERPL-SCNC: 103 MMOL/L (ref 94–109)
CHOLEST SERPL-MCNC: 152 MG/DL
CO2 SERPL-SCNC: 33 MMOL/L (ref 20–32)
CREAT SERPL-MCNC: 1.04 MG/DL (ref 0.66–1.25)
CREAT UR-MCNC: 300 MG/DL
GFR SERPL CREATININE-BSD FRML MDRD: 68 ML/MIN/{1.73_M2}
GLUCOSE SERPL-MCNC: 157 MG/DL (ref 70–99)
HBA1C MFR BLD: 7 % (ref 0–5.6)
HDLC SERPL-MCNC: 35 MG/DL
HGB BLD-MCNC: 14.9 G/DL (ref 13.3–17.7)
LDLC SERPL CALC-MCNC: 45 MG/DL
MICROALBUMIN UR-MCNC: 77 MG/L
MICROALBUMIN/CREAT UR: 25.57 MG/G CR (ref 0–17)
NONHDLC SERPL-MCNC: 117 MG/DL
POTASSIUM SERPL-SCNC: 3.8 MMOL/L (ref 3.4–5.3)
PROT SERPL-MCNC: 6.4 G/DL (ref 6.8–8.8)
PSA SERPL-ACNC: 4.81 UG/L (ref 0–4)
SODIUM SERPL-SCNC: 139 MMOL/L (ref 133–144)
TRIGL SERPL-MCNC: 360 MG/DL
TSH SERPL DL<=0.005 MIU/L-ACNC: 1.64 MU/L (ref 0.4–4)

## 2019-09-30 PROCEDURE — 84270 ASSAY OF SEX HORMONE GLOBUL: CPT | Performed by: INTERNAL MEDICINE

## 2019-09-30 PROCEDURE — G0103 PSA SCREENING: HCPCS | Performed by: INTERNAL MEDICINE

## 2019-09-30 PROCEDURE — 80053 COMPREHEN METABOLIC PANEL: CPT | Performed by: INTERNAL MEDICINE

## 2019-09-30 PROCEDURE — 83036 HEMOGLOBIN GLYCOSYLATED A1C: CPT | Performed by: INTERNAL MEDICINE

## 2019-09-30 PROCEDURE — 84403 ASSAY OF TOTAL TESTOSTERONE: CPT | Performed by: INTERNAL MEDICINE

## 2019-09-30 PROCEDURE — 36415 COLL VENOUS BLD VENIPUNCTURE: CPT | Performed by: INTERNAL MEDICINE

## 2019-09-30 PROCEDURE — 85018 HEMOGLOBIN: CPT | Performed by: INTERNAL MEDICINE

## 2019-09-30 PROCEDURE — 82043 UR ALBUMIN QUANTITATIVE: CPT | Performed by: INTERNAL MEDICINE

## 2019-09-30 PROCEDURE — 84443 ASSAY THYROID STIM HORMONE: CPT | Performed by: INTERNAL MEDICINE

## 2019-09-30 PROCEDURE — 80061 LIPID PANEL: CPT | Performed by: INTERNAL MEDICINE

## 2019-10-01 DIAGNOSIS — E03.9 HYPOTHYROIDISM, UNSPECIFIED TYPE: ICD-10-CM

## 2019-10-01 RX ORDER — LEVOTHYROXINE SODIUM 125 UG/1
125 TABLET ORAL DAILY
Qty: 90 TABLET | Refills: 1 | Status: SHIPPED | OUTPATIENT
Start: 2019-10-01 | End: 2020-03-31

## 2019-10-02 ENCOUNTER — TELEPHONE (OUTPATIENT)
Dept: INTERNAL MEDICINE | Facility: CLINIC | Age: 79
End: 2019-10-02

## 2019-10-02 LAB
SHBG SERPL-SCNC: 7 NMOL/L (ref 11–80)
TESTOST FREE SERPL-MCNC: 31.05 NG/DL (ref 4.7–24.4)
TESTOST SERPL-MCNC: 833 NG/DL (ref 240–950)

## 2019-10-02 NOTE — TELEPHONE ENCOUNTER
Call pt. Pt's labs show an elevated PSA (prostate) lab result. Therefore pt needs to STOP use of testosterone replacement. Pt to see me 10/11/19 as scheduled to discuss this and other lab results. Check blood sugars four times a day (before meals and bedtime) for the 4 days prior to the appointment and bring these results to the appointment.

## 2019-10-07 DIAGNOSIS — I48.3 TYPICAL ATRIAL FLUTTER (H): ICD-10-CM

## 2019-10-11 ENCOUNTER — OFFICE VISIT (OUTPATIENT)
Dept: INTERNAL MEDICINE | Facility: CLINIC | Age: 79
End: 2019-10-11
Payer: COMMERCIAL

## 2019-10-11 VITALS
RESPIRATION RATE: 16 BRPM | HEIGHT: 71 IN | SYSTOLIC BLOOD PRESSURE: 124 MMHG | HEART RATE: 75 BPM | OXYGEN SATURATION: 98 % | TEMPERATURE: 98.5 F | BODY MASS INDEX: 27.72 KG/M2 | DIASTOLIC BLOOD PRESSURE: 72 MMHG | WEIGHT: 198 LBS

## 2019-10-11 DIAGNOSIS — M79.644 BILATERAL THUMB PAIN: ICD-10-CM

## 2019-10-11 DIAGNOSIS — E34.9 TESTOSTERONE DEFICIENCY: ICD-10-CM

## 2019-10-11 DIAGNOSIS — Z00.00 MEDICARE ANNUAL WELLNESS VISIT, INITIAL: Primary | ICD-10-CM

## 2019-10-11 DIAGNOSIS — J31.0 CHRONIC RHINITIS: ICD-10-CM

## 2019-10-11 DIAGNOSIS — M79.645 BILATERAL THUMB PAIN: ICD-10-CM

## 2019-10-11 DIAGNOSIS — Z79.4 TYPE 2 DIABETES MELLITUS WITHOUT COMPLICATION, WITH LONG-TERM CURRENT USE OF INSULIN (H): ICD-10-CM

## 2019-10-11 DIAGNOSIS — Z12.5 ENCOUNTER FOR SCREENING FOR MALIGNANT NEOPLASM OF PROSTATE: ICD-10-CM

## 2019-10-11 DIAGNOSIS — Z23 NEED FOR PROPHYLACTIC VACCINATION AND INOCULATION AGAINST INFLUENZA: ICD-10-CM

## 2019-10-11 DIAGNOSIS — N41.0 ACUTE PROSTATITIS: ICD-10-CM

## 2019-10-11 DIAGNOSIS — R80.9 PROTEINURIA, UNSPECIFIED TYPE: ICD-10-CM

## 2019-10-11 DIAGNOSIS — E11.9 TYPE 2 DIABETES MELLITUS WITHOUT COMPLICATION, WITH LONG-TERM CURRENT USE OF INSULIN (H): ICD-10-CM

## 2019-10-11 PROCEDURE — G0008 ADMIN INFLUENZA VIRUS VAC: HCPCS | Performed by: INTERNAL MEDICINE

## 2019-10-11 PROCEDURE — 90662 IIV NO PRSV INCREASED AG IM: CPT | Performed by: INTERNAL MEDICINE

## 2019-10-11 PROCEDURE — 99207 C PAF COMPLETED  NO CHARGE: CPT | Mod: 25 | Performed by: INTERNAL MEDICINE

## 2019-10-11 PROCEDURE — 99207 C FOOT EXAM  NO CHARGE: CPT | Mod: 25 | Performed by: INTERNAL MEDICINE

## 2019-10-11 PROCEDURE — G0438 PPPS, INITIAL VISIT: HCPCS | Performed by: INTERNAL MEDICINE

## 2019-10-11 PROCEDURE — 99214 OFFICE O/P EST MOD 30 MIN: CPT | Mod: 25 | Performed by: INTERNAL MEDICINE

## 2019-10-11 RX ORDER — DOXYCYCLINE 100 MG/1
100 CAPSULE ORAL 2 TIMES DAILY
Qty: 20 CAPSULE | Refills: 0 | Status: SHIPPED | OUTPATIENT
Start: 2019-10-11 | End: 2019-11-06

## 2019-10-11 RX ORDER — AZELASTINE 1 MG/ML
2 SPRAY, METERED NASAL 2 TIMES DAILY
Qty: 1 BOTTLE | Refills: 11 | Status: SHIPPED | OUTPATIENT
Start: 2019-10-11 | End: 2020-12-01

## 2019-10-11 ASSESSMENT — MIFFLIN-ST. JEOR: SCORE: 1640.25

## 2019-10-11 ASSESSMENT — ACTIVITIES OF DAILY LIVING (ADL): CURRENT_FUNCTION: NO ASSISTANCE NEEDED

## 2019-10-11 NOTE — PATIENT INSTRUCTIONS
Stop Metformn to see if loose stools and bloating of abdomen improves. IOn   Repeat nonfasting A1C, urine protein and PSA labs in mid January 2020  Appt with  [facility] [phone] re: thumb base pains if willing to consider cortisone injection. Dr Titus, Dr Posadas or Dr Abreu    Doxycycline 100mg capsule, 1 capsule  Twice a day with food for 10 days for possible prostate infection  Call update [phone] or email in [employer] an update in 2 weeks re: urine flow and if pain with urination or abdominal symptoms still present  Stop Flonase   Start Azelastine 1 spray each nostril twice a day for nasal drainage and cough  Continue other medications   Flu vaccine  Pt was informed regarding extra E&M billing for management of new or established medical issues not related to today's wellness visit  Check with insurance or speak with your pharmacist re: Shingrix vaccine coverage for shingles prevention.  This is a 2 shot series done 2-6 months apartow

## 2019-10-11 NOTE — PROGRESS NOTES
"SUBJECTIVE:   Pascual Perea is a 79 year old male who presents for Preventive Visit and follow-up regarding recent elevated PSA and history hypoactive testosteronism and diabetes  Are you in the first 12 months of your Medicare coverage?  No    Healthy Habits:     In general, how would you rate your overall health?  Good    Frequency of exercise:  None    Do you usually eat at least 4 servings of fruit and vegetables a day, include whole grains    & fiber and avoid regularly eating high fat or \"junk\" foods?  No    Taking medications regularly:  Yes    Medication side effects:  None    Ability to successfully perform activities of daily living:  No assistance needed    Home Safety:  No safety concerns identified    Hearing Impairment:  No hearing concerns    In the past 6 months, have you been bothered by leaking of urine?  No    In general, how would you rate your overall mental or emotional health?  Good      PHQ-2 Total Score: 0    Additional concerns today:  No    Do you feel safe in your environment? Yes    Do you have a Health Care Directive? Yes: Patient states has Advance Directive and will bring in a copy to clinic.      Fall risk  Fallen 2 or more times in the past year?: No  Any fall with injury in the past year?: No    Cognitive Screening   1) Repeat 3 items (Leader, Season, Table)    2) Clock draw: NORMAL  3) 3 item recall: Recalls 2 objects   Results: NORMAL clock, 1-2 items recalled: COGNITIVE IMPAIRMENT LESS LIKELY    Mini-CogTM Copyright S [employer]. Licensed by the author for use in [facility]; reprinted with permission (dougie@.[facility]). All rights reserved.      Do you have sleep apnea, excessive snoring or daytime drowsiness?: no    Reviewed and updated as needed this visit by clinical staff  Tobacco  Allergies  Meds         Reviewed and updated as needed this visit by Provider        Social History     Tobacco Use     Smoking status: Former Smoker     Packs/day: 1.00     Years: " 6.00     Pack years: 6.00     Start date:      Last attempt to quit: 1964     Years since quittin.3     Smokeless tobacco: Never Used   Substance Use Topics     Alcohol use: Yes     Comment: couple drinks a year     If you drink alcohol do you typically have >3 drinks per day or >7 drinks per week? No    Alcohol Use 10/11/2019   Prescreen: >3 drinks/day or >7 drinks/week? No               Current providers sharing in care for this patient include:   Patient Care Team:  Andrew Elizalde MD as PCP - General  Andrew Elizalde MD as Assigned PCP    The following health maintenance items are reviewed in Epic and correct as of today:  Health Maintenance   Topic Date Due     MEDICARE ANNUAL WELLNESS VISIT  2006     ZOSTER IMMUNIZATION (2 of 3) 2009     ADVANCE CARE PLANNING  2016     INFLUENZA VACCINE (1) 2019     DIABETIC FOOT EXAM  2019     EYE EXAM  2020     A1C  2020     FALL RISK ASSESSMENT  2020     DTAP/TDAP/TD IMMUNIZATION (3 - Td) 2020     ALT  2020     BMP  2020     LIPID  2020     MICROALBUMIN  2020     TSH W/FREE T4 REFLEX  2020     CREATININE  2020     COLONOSCOPY  2022     PHQ-2  Completed     PNEUMOCOCCAL IMMUNIZATION 65+ LOW/MEDIUM RISK  Completed     IPV IMMUNIZATION  Aged Out     MENINGITIS IMMUNIZATION  Aged Out     Labs reviewed in EPIC      Component      Latest Ref Rng & Units 2018 3/29/2019 2019   Sodium      133 - 144 mmol/L   139   Potassium      3.4 - 5.3 mmol/L   3.8   Chloride      94 - 109 mmol/L   103   Carbon Dioxide      20 - 32 mmol/L   33 (H)   Anion Gap      3 - 14 mmol/L   3   Glucose      70 - 99 mg/dL   157 (H)   Urea Nitrogen      7 - 30 mg/dL   11   Creatinine      0.66 - 1.25 mg/dL   1.04   GFR Estimate      >60 mL/min/1.73:m2   68   GFR Estimate If Black      >60 mL/min/1.73:m2   79   Calcium      8.5 - 10.1 mg/dL   8.9   Bilirubin Total      0.2 - 1.3 mg/dL   1.2    Albumin      3.4 - 5.0 g/dL   3.6   Protein Total      6.8 - 8.8 g/dL   6.4 (L)   Alkaline Phosphatase      40 - 150 U/L   95   ALT      0 - 70 U/L   39   AST      0 - 45 U/L   22   Cholesterol      <200 mg/dL   152   Triglycerides      <150 mg/dL   360 (H)   HDL Cholesterol      >39 mg/dL   35 (L)   LDL Cholesterol Calculated      <100 mg/dL   45   Non HDL Cholesterol      <130 mg/dL   117   Testosterone Total      240 - 950 ng/dL [phone]   Sex Hormone Binding Globulin      11 - 80 nmol/L 8 (L) 9 (L) 7 (L)   Free Testosterone Calculated      4.7 - 24.4 ng/dL 16.35 9.49 31.05 (H)   Creatinine Urine      mg/dL   300   Albumin Urine mg/L      mg/L   77   Albumin Urine mg/g Cr      0 - 17 mg/g Cr   25.57 (H)   PSA      0 - 4 ug/L 3.81  4.81 (H)   Hemoglobin A1C      0 - 5.6 %   7.0 (H)   TSH      0.40 - 4.00 mU/L   1.64   Hemoglobin      13.3 - 17.7 g/dL   14.9         Sugars:   129,124,163,137  130,x,131,125  110,x,154,208  179,169,109,146  132,180,142,170  118,97,174,200  128,117,152,98  97,97,151,167    Review of Systems  CONSTITUTIONAL: NEGATIVE for fever, chills. Weight down from  March 2019  INTEGUMENTARY/SKIN: NEGATIVE for worrisome rashes, moles or lesions  EYES: NEGATIVE for vision changes. Has occ floaters. Eye exam due jan 2020  ENT/MOUTH: NEGATIVE for ear, mouth and throat problems.  Some post nasal drainage and tickle cough despite Flonase and Allegra  RESP: NEGATIVE for significant cough or SOB  CV: NEGATIVE for chest pain, palpitations or peripheral edema  GI: NEGATIVE for nausea, vomitting   Heartburn. Chronic loose stools. Occ cramping. No blood seen instools  : NEGATIVE for frequency or hematuria. Mild dysuria and some  hesitancy for 3 weeks. Elevated PSA as above  MUSCULOSKELETAL: NEGATIVE for significant arthralgias or myalgia except for rare thumb base soreness bilaterally. No sx now  NEURO: NEGATIVE for weakness, dizziness. Mild numbness in  feet  ENDOCRINE: NEGATIVE for temperature  "intolerance. DM, testosterone and lipids as above. Note that pt had last testosterone lab done 1 week after injection rather than 2 weeks after as previous  HEME: NEGATIVE for bleeding problems with Eliquis  PSYCHIATRIC: NEGATIVE for changes in mood or affect overall. Some loneliness with wife in memory unit now.  Visits daily     OBJECTIVE:   /72   Pulse 75   Temp 98.5  F (36.9  C) (Temporal)   Resp 16   Ht 1.803 m (5' 11\")   Wt 89.8 kg (198 lb)   SpO2 98%   BMI 27.62 kg/m   Estimated body mass index is 27.62 kg/m  as calculated from the following:    Height as of this encounter: 1.803 m (5' 11\").    Weight as of this encounter: 89.8 kg (198 lb).  Physical Exam  General appearance -   alert, no distress  Skin - No rashes or lesions.  Head - normocephalic, atraumatic  Eyes - ANGELA, EOMI, fundi exam with nondilated pupils negative.  Ears - External ears normal. Canals clear. TM's normal.  Nose/Sinuses - Nares normal. Septum midline. Mucosa norm mildly edematous with some clear nasal discharge.  No  sinus tenderness.  Oropharynx - No erythema, no adenopathy, no exudates.  Neck - Supple without adenopathy or thyromegaly. No bruits.  Lungs - Clear to auscultation without wheezes/rhonchi.  Heart - Regular rate and rhythm without murmurs, clicks, or gallops.  Nodes - No supraclavicular, axillary, or inguinal adenopathy palpable.  Abdomen - Abdomen soft, non-tender. BS normal. No masses or hepatosplenomegaly palpable. No bruits.  Extremities -No cyanosis, clubbing or edema.    Musculoskeletal - Spine ROM normal. Muscular strength intact.  Mild OA changes to fingers. Mild tenderness to thumb range of motion bilaterally at base joints   No joint warmth/erythema  Peripheral pulses - radial=4/4, femoral=4/4, posterior tibial=4/4, dorsalis pedis=4/4,  Neuro - Gait normal. Reflexes normal and symmetric. Sensation grossly WNL.  Genital - Normal-appearing male external genitalia. No scrotal masses or inguinal hernia " palpable.   Rectal - Guaic negative stool. Normal tone. Prostate 1+ in size to palpation and mildly boggy and tender to palpation. No rectal masses or prostate nodularity palpable       ASSESSMENT / PLAN:   1. Medicare annual wellness visit, initial  See below for healthcare maintenance discussion.  Otherwise up-to-date    2. Testosterone deficiency  Previously on testosterone replacement.  With recent PSA elevation, unclear if related to testosterone exposure versus infection versus prostate cancer.  Testosterone replacement has been stopped at this time.  Recent lab testosterone level elevated though this was inadvertently done just 1 week after patient's injection so value biased.  Levels normally have been checked when patient is at the very end of his dosing cycle and previously normal with same previous testosterone injection dosage.  Will stay off testosterone for now until prostate issue clarified further with  - OFFICE/OUTPT VISIT,EST,LEVL IV    3. Type 2 diabetes mellitus without complication, with long-term current use of insulin (H)  A1c currently at goal.  Some variation in blood sugars recently depending on diet compliance.  Counseled regarding limiting carbohydrate intake.  Patient having some mild loose stools and bloating symptoms.  Possibly related to metformin.  Will discontinue the medication to see if symptoms improved.  Continue other DM current medication for now and recheck A1c in 3 months  - FOOT EXAM  - Hemoglobin A1c; Future  - OFFICE/OUTPT VISIT,EST,LEVL IV    4. Proteinuria, unspecified type  Urine protein level mildly elevated.  Currently on lisinopril 30 mg daily.  Diabetic control as above.  Recheck 3 months.  If elevated further, will push lisinopril dosage further  - OFFICE/OUTPT VISIT,EST,LEVL IV  - Albumin Random Urine Quantitative with Creat Ratio; Future    5. Acute prostatitis  ?  contributing to elevated PSA.  Unable to treat with Cipro or Septra due to amiodarone use.  Will  "treat with doxycycline therapy as prescribed  - doxycycline hyclate (VIBRAMYCIN) 100 MG capsule; Take 1 capsule (100 mg) by mouth 2 times daily for 10 days  Dispense: 20 capsule; Refill: 0  - OFFICE/OUTPT VISIT,EST,LEVL IV    6. Chronic rhinitis  Uncontrolled.  Will start Azelastine for possible vasomotor component  - azelastine (ASTELIN) 0.1 % nasal spray; Spray 2 sprays into both nostrils 2 times daily  Dispense: 1 Bottle; Refill: 11    7. Encounter for screening for malignant neoplasm of prostate   Currently elevated. ? Related to infection vs prostate tissue growth with testosterone versus cancer.  Recheck PSA in 3 months  - Prostate spec antigen screen; Future    8. Need for prophylactic vaccination and inoculation against influenza  - INFLUENZA (HIGH DOSE) 3 VALENT VACCINE [50552]  - ADMIN INFLUENZA (For MEDICARE Patients ONLY) []     9. Bilateral thumb pain  Pt to see ortho for injection  - ORTHOPEDICS ADULT REFERRAL      End of Life Planning:  Patient currently has an advanced directive: Yes.  Practitioner is supportive of decision.    COUNSELING:  Reviewed preventive health counseling, as reflected in patient instructions    Estimated body mass index is 27.62 kg/m  as calculated from the following:    Height as of this encounter: 1.803 m (5' 11\").    Weight as of this encounter: 89.8 kg (198 lb).         reports that he quit smoking about 55 years ago. He started smoking about 61 years ago. He has a 6.00 pack-year smoking history. He has never used smokeless tobacco.      Appropriate preventive services were discussed with this patient, including applicable screening as appropriate for cardiovascular disease, diabetes, osteopenia/osteoporosis, and glaucoma.  As appropriate for age/gender, discussed screening for colorectal cancer, prostate cancer, breast cancer, and cervical cancer. Checklist reviewing preventive services available has been given to the patient.    Reviewed patients plan of care and " provided an AVS. The Basic Care Plan (routine screening as documented in Health Maintenance) for Pascual meets the Care Plan requirement. This Care Plan has been established and reviewed with the Patient.    Counseling Resources:  ATP IV Guidelines  Pooled Cohorts Equation Calculator  Breast Cancer Risk Calculator  FRAX Risk Assessment  ICSI Preventive Guidelines  Dietary Guidelines for Americans, 2010  USDA's MyPlate  ASA Prophylaxis  Lung CA Screening       PLAN:   Stop Metformn to see if loose stools and bloating of abdomen improves. IOn   Repeat nonfasting A1C, urine protein and PSA labs in mid January 2020  Appt with  [facility] [phone] re: thumb base pains if willing to consider cortisone injection. Dr Titus, Dr Posadas or Dr Abreu    Doxycycline 100mg capsule, 1 capsule  Twice a day with food for 10 days for possible prostate infection  Call update [phone] or email in StudyRoom an update in 2 weeks re: urine flow and if pain with urination or abdominal symptoms still present  Stop Flonase   Start Azelastine 1 spray each nostril twice a day for nasal drainage and cough  Continue other medications   Flu vaccine  Pt was informed regarding extra E&M billing for management of new or established medical issues not related to today's wellness visit  Check with insurance or speak with your pharmacist re: Shingrix vaccine coverage for shingles prevention.  This is a 2 shot series done 2-6 months apart      Andrew Elizalde MD  [facility]

## 2019-10-22 DIAGNOSIS — E78.5 HYPERLIPIDEMIA LDL GOAL <70: ICD-10-CM

## 2019-10-22 RX ORDER — PRAVASTATIN SODIUM 80 MG/1
80 TABLET ORAL AT BEDTIME
Qty: 90 TABLET | Refills: 2 | Status: SHIPPED | OUTPATIENT
Start: 2019-10-22 | End: 2020-02-17

## 2019-11-04 ENCOUNTER — HEALTH MAINTENANCE LETTER (OUTPATIENT)
Age: 79
End: 2019-11-04

## 2019-11-05 ENCOUNTER — TELEPHONE (OUTPATIENT)
Dept: INTERNAL MEDICINE | Facility: CLINIC | Age: 79
End: 2019-11-05

## 2019-11-05 DIAGNOSIS — N41.0 ACUTE PROSTATITIS: ICD-10-CM

## 2019-11-06 RX ORDER — DOXYCYCLINE 100 MG/1
100 CAPSULE ORAL 2 TIMES DAILY
Qty: 28 CAPSULE | Refills: 0 | Status: SHIPPED | OUTPATIENT
Start: 2019-11-06 | End: 2020-01-08

## 2019-11-06 NOTE — TELEPHONE ENCOUNTER
With prostatitis,  It sometimes takes a  longer course of abx therapy   to treat the  infection  Patient was initially treated with 10 days of doxycycline.  Will extend doxycycline prescription for 14 additional days.   Rx sent to the [facility] pharmacy.  If symptoms persist beyond that additional 14 days of  abx, then patient to be seen back in clinic for repeat examination

## 2019-11-19 ENCOUNTER — DOCUMENTATION ONLY (OUTPATIENT)
Dept: CARDIOLOGY | Facility: CLINIC | Age: 79
End: 2019-11-19

## 2019-11-19 ENCOUNTER — OFFICE VISIT (OUTPATIENT)
Dept: CARDIOLOGY | Facility: CLINIC | Age: 79
End: 2019-11-19
Attending: PHYSICIAN ASSISTANT
Payer: COMMERCIAL

## 2019-11-19 VITALS
BODY MASS INDEX: 27.16 KG/M2 | WEIGHT: 194 LBS | SYSTOLIC BLOOD PRESSURE: 130 MMHG | DIASTOLIC BLOOD PRESSURE: 68 MMHG | HEART RATE: 80 BPM | HEIGHT: 71 IN

## 2019-11-19 DIAGNOSIS — I10 BENIGN ESSENTIAL HYPERTENSION: ICD-10-CM

## 2019-11-19 DIAGNOSIS — I48.19 PERSISTENT ATRIAL FIBRILLATION (H): Primary | ICD-10-CM

## 2019-11-19 DIAGNOSIS — Z79.899 ON AMIODARONE THERAPY: ICD-10-CM

## 2019-11-19 DIAGNOSIS — R74.8 ELEVATED LIVER ENZYMES: Primary | ICD-10-CM

## 2019-11-19 DIAGNOSIS — I48.19 PERSISTENT ATRIAL FIBRILLATION (H): ICD-10-CM

## 2019-11-19 LAB
ALBUMIN SERPL-MCNC: 3.8 G/DL (ref 3.4–5)
ALP SERPL-CCNC: 201 U/L (ref 40–150)
ALT SERPL W P-5'-P-CCNC: 137 U/L (ref 0–70)
ANION GAP SERPL CALCULATED.3IONS-SCNC: 4 MMOL/L (ref 3–14)
AST SERPL W P-5'-P-CCNC: 63 U/L (ref 0–45)
BILIRUB DIRECT SERPL-MCNC: 0.4 MG/DL (ref 0–0.2)
BILIRUB SERPL-MCNC: 1.3 MG/DL (ref 0.2–1.3)
BUN SERPL-MCNC: 22 MG/DL (ref 7–30)
CALCIUM SERPL-MCNC: 9.4 MG/DL (ref 8.5–10.1)
CHLORIDE SERPL-SCNC: 105 MMOL/L (ref 94–109)
CO2 SERPL-SCNC: 27 MMOL/L (ref 20–32)
CREAT SERPL-MCNC: 1.17 MG/DL (ref 0.66–1.25)
GFR SERPL CREATININE-BSD FRML MDRD: 59 ML/MIN/{1.73_M2}
GLUCOSE SERPL-MCNC: 309 MG/DL (ref 70–99)
POTASSIUM SERPL-SCNC: 4.5 MMOL/L (ref 3.4–5.3)
PROT SERPL-MCNC: 6.9 G/DL (ref 6.8–8.8)
SODIUM SERPL-SCNC: 136 MMOL/L (ref 133–144)
TSH SERPL DL<=0.005 MIU/L-ACNC: 0.71 MU/L (ref 0.4–4)

## 2019-11-19 PROCEDURE — 80076 HEPATIC FUNCTION PANEL: CPT | Performed by: PHYSICIAN ASSISTANT

## 2019-11-19 PROCEDURE — 93000 ELECTROCARDIOGRAM COMPLETE: CPT | Performed by: INTERNAL MEDICINE

## 2019-11-19 PROCEDURE — 99214 OFFICE O/P EST MOD 30 MIN: CPT | Performed by: INTERNAL MEDICINE

## 2019-11-19 PROCEDURE — 84443 ASSAY THYROID STIM HORMONE: CPT | Performed by: PHYSICIAN ASSISTANT

## 2019-11-19 PROCEDURE — 80048 BASIC METABOLIC PNL TOTAL CA: CPT | Performed by: PHYSICIAN ASSISTANT

## 2019-11-19 PROCEDURE — 36415 COLL VENOUS BLD VENIPUNCTURE: CPT | Performed by: PHYSICIAN ASSISTANT

## 2019-11-19 ASSESSMENT — MIFFLIN-ST. JEOR: SCORE: 1622.11

## 2019-11-19 NOTE — PROGRESS NOTES
Spoke to patient regarding results of labs done today.  Recommendations per KESHAV Yeager  -----Liver tests show the liver looks really irritated, just like it did back when he had gallbladder surgery 3/2019!  -----Is he having any abdominal discomfort?  Patient reports he did have dull aching pain in stomach above belly button, diarrhiea and pain with urination.  Was seen by PCP who treated him with 2 rounds of antibiotics for his prostatitis and his symptoms are getting better.    -----STOP amiodarone medication list updated  -----Get repeat CMP in 4-6 weeks. I have ordered. We will contact with results  Scheduled for 12/20  ----TSH and BMP were fine.  ----Call for recurrent AFib! Follow-up 3 months  Appointment scheduled for 2/17/20 with KESHAV Yeager.  Patient provided verbal understanding regarding above.  Will updated Joleen on abdominal pain.  ROLAND Sullivan

## 2019-11-19 NOTE — LETTER
2019      Andrew Elizalde MD  [address] [address]      RE: Pascual Vasquez       Dear Colleague,    I had the pleasure of seeing Pascual Vasquez in the [facility].    Service Date: 2019      HISTORY OF PRESENT ILLNESS:  Thank you for allowing me to participate in the care of this very delightful patient.  As you know, Dayday is a 79-year-old gentleman with stable CAD status post coronary bypass more than 20 years ago whom I have been seeing for symptomatic persistent atrial fibrillation with controlled ventricular response.  We have tried a few cardioversions in the past but fortunately while on amiodarone, we were able to maintain sinus rhythm since this past May.  EKG today demonstrates sinus rhythm with a ventricular rate of 75 beats per minute.  In the past, we had to reduce the dose of amiodarone because of the sinus bradycardia.  The patient had a TSH level obtained today but still pending.  Unfortunately, his ALT and AST are somewhat elevated with ALT of 137 and an AST of 63.       Overall, the patient is feeling well.  He is able to do most of the things he wants to, although sometimes he feels short of breath with certain activities but there has been really no drastic change in it.  I encouraged the patient to be as active as he can.  In the meantime, I would do more chart review on his transaminase to see what the level has been like, as it could have been from his statins as well.  We will call the patient regarding the TSH and T4 level and what to do about his symptoms and elevated transaminase level.        cc:   Andrew Elizalde MD    [facility]    [address] [place] [id number]         IOANA OLVERA MD             D: 2019   T: 2019   MT: HARI      Name:     PASCUAL VASQUEZ   MRN:      [id number]        Account:      [id number]   :      1940           Service Date: 2019      Document: [id number]            Outpatient Encounter Medications as of 2019   Medication Sig Dispense Refill     apixaban ANTICOAGULANT (ELIQUIS) 5 MG tablet Take 1 tablet (5 mg) by mouth 2 times daily 180 tablet 2     azelastine (ASTELIN) 0.1 % nasal spray Spray 2 sprays into both nostrils 2 times daily 1 Bottle 11     Cholecalciferol (VITAMIN D3 PO) Take 5,000 Units by mouth daily       [] doxycycline hyclate (VIBRAMYCIN) 100 MG capsule Take 1 capsule (100 mg) by mouth 2 times daily for 14 days 28 capsule 0     fexofenadine (ALLEGRA) 180 MG tablet Take 1 tablet (180 mg) by mouth daily 90 tablet 3     furosemide (LASIX) 20 MG tablet TAKE ONE TABLET BY MOUTH ONCE DAILY 30 tablet 9     GLUCOSE 4 GM OR CHEW as directed 30 11     insulin aspart (NOVOLOG FLEXPEN) 100 UNIT/ML pen Inject 3 times daily (before meals). 2 units per carb choice (15gm). Approx 5-10 units/meal. 30 mL 3     insulin detemir (LEVEMIR FLEXPEN/FLEXTOUCH) 100 UNIT/ML pen Inject 35-40 Units Subcutaneous At Bedtime (Patient taking differently: Inject 35 Units Subcutaneous At Bedtime ) 45 mL 3     levothyroxine (SYNTHROID/LEVOTHROID) 125 MCG tablet Take 1 tablet (125 mcg) by mouth daily 90 tablet 1     lisinopril (PRINIVIL/ZESTRIL) 30 MG tablet Take 1 tablet (30 mg) by mouth daily 90 tablet 3     nitroglycerin (NITROSTAT) 0.4 MG SL tablet Place 1 tablet (0.4 mg) under the tongue every 5 minutes as needed for chest pain 25 tablet 1     pravastatin (PRAVACHOL) 80 MG tablet Take 1 tablet (80 mg) by mouth At Bedtime 90 tablet 2     ranitidine (ZANTAC) 150 MG tablet TAKE ONE TABLET BY MOUTH TWICE A  tablet 2     [DISCONTINUED] amiodarone (PACERONE/CODARONE) 200 MG tablet Take 0.5 tablets (100 mg) by mouth every morning       No facility-administered encounter medications on file as of 2019.        Again, thank you for allowing me to participate in the care of your patient.      Sincerely,    Satnam Kasper MD     [facility]

## 2019-11-19 NOTE — PROGRESS NOTES
Service Date: 2019      HISTORY OF PRESENT ILLNESS:  Thank you for allowing me to participate in the care of this very delightful patient.  As you know, Dayday is a 79-year-old gentleman with stable CAD status post coronary bypass more than 20 years ago whom I have been seeing for symptomatic persistent atrial fibrillation with controlled ventricular response.  We have tried a few cardioversions in the past but fortunately while on amiodarone, we were able to maintain sinus rhythm since this past May.  EKG today demonstrates sinus rhythm with a ventricular rate of 75 beats per minute.  In the past, we had to reduce the dose of amiodarone because of the sinus bradycardia.  The patient had a TSH level obtained today but still pending.  Unfortunately, his ALT and AST are somewhat elevated with ALT of 137 and an AST of 63.       Overall, the patient is feeling well.  He is able to do most of the things he wants to, although sometimes he feels short of breath with certain activities but there has been really no drastic change in it.  I encouraged the patient to be as active as he can.  In the meantime, I would do more chart review on his transaminase to see what the level has been like, as it could have been from his statins as well.  We will call the patient regarding the TSH and T4 level and what to do about his symptoms and elevated transaminase level.        cc:   Andrew Elizalde MD    [facility]    [address] [place] [address]         IOANA OLVERA MD             D: 2019   T: 2019   MT: HARI      Name:     JESÚS VASQUEZ   MRN:      [id number]        Account:      [id number]   :      1940           Service Date: 2019      Document: [id number]

## 2019-11-19 NOTE — LETTER
11/19/2019    Andrew Elizalde MD  [address] [facility] [address]    RE: Pascual Perea       Dear Colleague,    I had the pleasure of seeing Pascual Perea in the [facility].    HPI and Plan:   See dictation  [id number]  Orders Placed This Encounter   Procedures     EKG 12-lead complete w/read - Clinics       No orders of the defined types were placed in this encounter.      There are no discontinued medications.      Encounter Diagnoses   Name Primary?     Persistent atrial fibrillation Yes     Benign essential hypertension        CURRENT MEDICATIONS:  Current Outpatient Medications   Medication Sig Dispense Refill     amiodarone (PACERONE/CODARONE) 200 MG tablet Take 0.5 tablets (100 mg) by mouth every morning       apixaban ANTICOAGULANT (ELIQUIS) 5 MG tablet Take 1 tablet (5 mg) by mouth 2 times daily 180 tablet 2     azelastine (ASTELIN) 0.1 % nasal spray Spray 2 sprays into both nostrils 2 times daily 1 Bottle 11     Cholecalciferol (VITAMIN D3 PO) Take 5,000 Units by mouth daily       doxycycline hyclate (VIBRAMYCIN) 100 MG capsule Take 1 capsule (100 mg) by mouth 2 times daily for 14 days 28 capsule 0     fexofenadine (ALLEGRA) 180 MG tablet Take 1 tablet (180 mg) by mouth daily 90 tablet 3     furosemide (LASIX) 20 MG tablet TAKE ONE TABLET BY MOUTH ONCE DAILY 30 tablet 9     GLUCOSE 4 GM OR CHEW as directed 30 11     insulin aspart (NOVOLOG FLEXPEN) 100 UNIT/ML pen Inject 3 times daily (before meals). 2 units per carb choice (15gm). Approx 5-10 units/meal. 30 mL 3     insulin detemir (LEVEMIR FLEXPEN/FLEXTOUCH) 100 UNIT/ML pen Inject 35-40 Units Subcutaneous At Bedtime (Patient taking differently: Inject 35 Units Subcutaneous At Bedtime ) 45 mL 3     levothyroxine (SYNTHROID/LEVOTHROID) 125 MCG tablet Take 1 tablet (125 mcg) by mouth daily 90 tablet 1     lisinopril (PRINIVIL/ZESTRIL) 30 MG tablet Take 1 tablet (30 mg) by mouth daily 90 tablet 3     nitroglycerin  (NITROSTAT) 0.4 MG SL tablet Place 1 tablet (0.4 mg) under the tongue every 5 minutes as needed for chest pain 25 tablet 1     pravastatin (PRAVACHOL) 80 MG tablet Take 1 tablet (80 mg) by mouth At Bedtime 90 tablet 2     ranitidine (ZANTAC) 150 MG tablet TAKE ONE TABLET BY MOUTH TWICE A  tablet 2       ALLERGIES     Allergies   Allergen Reactions     Omeprazole      diarrhea       PAST MEDICAL HISTORY:  Past Medical History:   Diagnosis Date     Adhesive capsulitis of shoulder      Anemia 3/10/2014     Anemia, unspecified      Antiplatelet or antithrombotic long-term use      Arrhythmia     Atrial fib/flutter     CAD (coronary artery disease)      History of cardioversion 04/24/2018    a single synchronized shock of 120 joules restored normal sinus rhythm     History of cardioversion 02/13/2019    single shock , 200 joules successul in restoring NSR     Hyperlipidemia LDL goal <70 5/26/2011     Hypertension      Hypothyroidism      Impotence of organic origin      Laceration of finger  June 2010     left thumb s/p sutures     Numbness and tingling     diabetic neuropathy bilateral feet     BRANDON (obstructive sleep apnea)     intolerant of CPAP, uses it occasionally.  Using mandibular device occasionally     Osteopenia      Pulmonary hypertension (H) 4/6/2012     Sensorineural hearing loss, unspecified      Stented coronary artery 1997    CABG      Testosterone deficiency      Type 2 diabetes mellitus without complication  (goal A1C<8) 10/24/2015     Typical atrial flutter (H) 4/18/16     Unspecified hypothyroidism      Vitamin D deficiency 9/3/2011       PAST SURGICAL HISTORY:  Past Surgical History:   Procedure Laterality Date     ANESTHESIA CARDIOVERSION N/A 2/13/2019    Procedure: ANESTHESIA CARDIOVERSION;  Surgeon: GENERIC ANESTHESIA PROVIDER;  Location:  OR     ANESTHESIA CARDIOVERSION N/A 5/22/2019    Procedure: ANESTHESIA, FOR CARDIOVERSION;  Surgeon: GENERIC ANESTHESIA PROVIDER;  Location:  OR      C NONSPECIFIC PROCEDURE  1995    CABG ([facility])     C NONSPECIFIC PROCEDURE  8/01    stress echo     CARDIAC SURGERY      bypass in 1997     CARDIOVERSION  04/24/2018     COLONOSCOPY       CORONARY ANGIOGRAPHY ADULT ORDER      4/2/2012     CORONARY ARTERY BYPASS  1997    [facility]     ENDOSCOPIC ULTRASOUND UPPER GASTROINTESTINAL TRACT (GI) N/A 3/14/2019    Procedure: ENDOSCOPIC ULTRASOUND OF UPPER GASTROINTESTINAL TRACT;  Surgeon: Ju Torres MD;  Location:  OR     EXCISE MASS HEAD Left 8/18/2016    Procedure: EXCISE MASS HEAD;  Surgeon: Ivan Hernandez MD;  Location: [facility]     HEART CATH, ANGIOPLASTY  4/2012     LAPAROSCOPIC CHOLECYSTECTOMY N/A 3/17/2019    Procedure: LAPAROSCOPIC CHOLECYSTECTOMY;  Surgeon: Janel Paz MD;  Location:  OR     PHACOEMULSIFICATION CLEAR CORNEA WITH STANDARD INTRAOCULAR LENS IMPLANT Left 6/8/2015    Procedure: PHACOEMULSIFICATION CLEAR CORNEA WITH STANDARD INTRAOCULAR LENS IMPLANT;  Surgeon: Nixon Farnsworth MD;  Location:  EC     PHACOEMULSIFICATION CLEAR CORNEA WITH STANDARD INTRAOCULAR LENS IMPLANT Right 6/22/2015    Procedure: PHACOEMULSIFICATION CLEAR CORNEA WITH STANDARD INTRAOCULAR LENS IMPLANT;  Surgeon: Nixon Farnsworth MD;  Location:  EC     SEPTOPLASTY, TURBINOPLASTY, COMBINED Bilateral 8/18/2016    Procedure: COMBINED SEPTOPLASTY, TURBINOPLASTY;  Surgeon: Ivan Hernandez MD;  Location: [facility]       FAMILY HISTORY:  Family History   Problem Relation Age of Onset     Genitourinary Problems Father         d: age 89; ARF     Hypertension Father      Diabetes Mother         d: age 68, CAD     Heart Disease Mother         MI     Myocardial Infarction Mother      Cardiovascular Brother         d:  age 31; CAD     Heart Disease Brother         age 31, MI     Diabetes Sister         b:  1939; DM2       SOCIAL HISTORY:  Social History     Socioeconomic History     Marital status:      Spouse name: None      Number of children: None     Years of education: None     Highest education level: None   Occupational History     None   Social Needs     Financial resource strain: None     Food insecurity:     Worry: None     Inability: None     Transportation needs:     Medical: None     Non-medical: None   Tobacco Use     Smoking status: Former Smoker     Packs/day: 1.00     Years: 6.00     Pack years: 6.00     Start date:      Last attempt to quit: 1964     Years since quittin.4     Smokeless tobacco: Never Used   Substance and Sexual Activity     Alcohol use: Yes     Comment: couple drinks a year     Drug use: No     Sexual activity: Yes     Partners: Female   Lifestyle     Physical activity:     Days per week: None     Minutes per session: None     Stress: None   Relationships     Social connections:     Talks on phone: None     Gets together: None     Attends Synagogue service: None     Active member of club or organization: None     Attends meetings of clubs or organizations: None     Relationship status: None     Intimate partner violence:     Fear of current or ex partner: None     Emotionally abused: None     Physically abused: None     Forced sexual activity: None   Other Topics Concern     Parent/sibling w/ CABG, MI or angioplasty before 65F 55M? Not Asked      Service Not Asked     Blood Transfusions Not Asked     Caffeine Concern Yes     Comment: 2 cups coffee a day     Occupational Exposure Not Asked     Hobby Hazards Not Asked     Sleep Concern Yes     Comment: sleep apnea, wears cpap off and on     Stress Concern No     Weight Concern No     Special Diet Yes     Comment: diabetic diet      Back Care Not Asked     Exercise No     Comment: occ walk the mall     Bike Helmet Not Asked     Seat Belt Yes     Self-Exams Not Asked   Social History Narrative     None       Review of Systems:  Skin:  Negative       Eyes:  Positive for glasses reading  ENT:  Negative      Respiratory:  Negative      "  Cardiovascular:    Positive for;chest pain    Gastroenterology: Negative   cholecystectomy 3/2019  Genitourinary:  Negative      Musculoskeletal:  Positive for joint pain right thumb  Neurologic:  Positive for numbness or tingling of feet    Psychiatric:  Negative      Heme/Lymph/Imm:  Negative      Endocrine:  Positive for thyroid disorder;diabetes;night sweats      Physical Exam:  Vitals: /68   Pulse 80   Ht 1.803 m (5' 11\")   Wt 88 kg (194 lb)   BMI 27.06 kg/m       Constitutional:  cooperative, alert and oriented, well developed, well nourished, in no acute distress        Skin:  warm and dry to the touch, no apparent skin lesions or masses noted          Head:  normocephalic, no masses or lesions        Eyes:  pupils equal and round, conjunctivae and lids unremarkable, sclera white, no xanthalasma, EOMS intact, no nystagmus        Lymph:      ENT:  no pallor or cyanosis, dentition good        Neck:  carotid pulses are full and equal bilaterally, JVP normal, no carotid bruit        Respiratory:  normal breath sounds, clear to auscultation, normal A-P diameter, normal symmetry, normal respiratory excursion, no use of accessory muscles         Cardiac: regular rhythm, normal S1/S2, no S3 or S4, apical impulse not displaced, no murmurs, gallops or rubs                pulses full and equal, no bruits auscultated                                        GI:  abdomen soft, non-tender, BS normoactive, no mass, no HSM, no bruits        Extremities and Muscular Skeletal:  no deformities, clubbing, cyanosis, erythema observed              Neurological:  no gross motor deficits        Psych:  Alert and Oriented x 3        CC  Rebekah Marrero PA-C  [address] [address]                Thank you for allowing me to participate in the care of your patient.      Sincerely,     Satnam Kasper MD     [facility]    cc:   Rebekah Marrero PA-C  [address]  [place] [address]

## 2019-11-19 NOTE — PROGRESS NOTES
HPI and Plan:   See dictation  [id number]  Orders Placed This Encounter   Procedures     EKG 12-lead complete w/read - Clinics       No orders of the defined types were placed in this encounter.      There are no discontinued medications.      Encounter Diagnoses   Name Primary?     Persistent atrial fibrillation Yes     Benign essential hypertension        CURRENT MEDICATIONS:  Current Outpatient Medications   Medication Sig Dispense Refill     amiodarone (PACERONE/CODARONE) 200 MG tablet Take 0.5 tablets (100 mg) by mouth every morning       apixaban ANTICOAGULANT (ELIQUIS) 5 MG tablet Take 1 tablet (5 mg) by mouth 2 times daily 180 tablet 2     azelastine (ASTELIN) 0.1 % nasal spray Spray 2 sprays into both nostrils 2 times daily 1 Bottle 11     Cholecalciferol (VITAMIN D3 PO) Take 5,000 Units by mouth daily       doxycycline hyclate (VIBRAMYCIN) 100 MG capsule Take 1 capsule (100 mg) by mouth 2 times daily for 14 days 28 capsule 0     fexofenadine (ALLEGRA) 180 MG tablet Take 1 tablet (180 mg) by mouth daily 90 tablet 3     furosemide (LASIX) 20 MG tablet TAKE ONE TABLET BY MOUTH ONCE DAILY 30 tablet 9     GLUCOSE 4 GM OR CHEW as directed 30 11     insulin aspart (NOVOLOG FLEXPEN) 100 UNIT/ML pen Inject 3 times daily (before meals). 2 units per carb choice (15gm). Approx 5-10 units/meal. 30 mL 3     insulin detemir (LEVEMIR FLEXPEN/FLEXTOUCH) 100 UNIT/ML pen Inject 35-40 Units Subcutaneous At Bedtime (Patient taking differently: Inject 35 Units Subcutaneous At Bedtime ) 45 mL 3     levothyroxine (SYNTHROID/LEVOTHROID) 125 MCG tablet Take 1 tablet (125 mcg) by mouth daily 90 tablet 1     lisinopril (PRINIVIL/ZESTRIL) 30 MG tablet Take 1 tablet (30 mg) by mouth daily 90 tablet 3     nitroglycerin (NITROSTAT) 0.4 MG SL tablet Place 1 tablet (0.4 mg) under the tongue every 5 minutes as needed for chest pain 25 tablet 1     pravastatin (PRAVACHOL) 80 MG tablet Take 1 tablet (80 mg) by mouth At Bedtime 90 tablet 2      ranitidine (ZANTAC) 150 MG tablet TAKE ONE TABLET BY MOUTH TWICE A  tablet 2       ALLERGIES     Allergies   Allergen Reactions     Omeprazole      diarrhea       PAST MEDICAL HISTORY:  Past Medical History:   Diagnosis Date     Adhesive capsulitis of shoulder      Anemia 3/10/2014     Anemia, unspecified      Antiplatelet or antithrombotic long-term use      Arrhythmia     Atrial fib/flutter     CAD (coronary artery disease)      History of cardioversion 04/24/2018    a single synchronized shock of 120 joules restored normal sinus rhythm     History of cardioversion 02/13/2019    single shock , 200 joules successul in restoring NSR     Hyperlipidemia LDL goal <70 5/26/2011     Hypertension      Hypothyroidism      Impotence of organic origin      Laceration of finger  June 2010     left thumb s/p sutures     Numbness and tingling     diabetic neuropathy bilateral feet     BRANDON (obstructive sleep apnea)     intolerant of CPAP, uses it occasionally.  Using mandibular device occasionally     Osteopenia      Pulmonary hypertension (H) 4/6/2012     Sensorineural hearing loss, unspecified      Stented coronary artery 1997    CABG      Testosterone deficiency      Type 2 diabetes mellitus without complication  (goal A1C<8) 10/24/2015     Typical atrial flutter (H) 4/18/16     Unspecified hypothyroidism      Vitamin D deficiency 9/3/2011       PAST SURGICAL HISTORY:  Past Surgical History:   Procedure Laterality Date     ANESTHESIA CARDIOVERSION N/A 2/13/2019    Procedure: ANESTHESIA CARDIOVERSION;  Surgeon: GENERIC ANESTHESIA PROVIDER;  Location:  OR     ANESTHESIA CARDIOVERSION N/A 5/22/2019    Procedure: ANESTHESIA, FOR CARDIOVERSION;  Surgeon: GENERIC ANESTHESIA PROVIDER;  Location: [facility] OR     C NONSPECIFIC PROCEDURE  1995    CABG ([facility])     C NONSPECIFIC PROCEDURE  8/01    stress echo     CARDIAC SURGERY      bypass in 1997     CARDIOVERSION  04/24/2018     COLONOSCOPY       CORONARY ANGIOGRAPHY ADULT  ORDER      4/2/2012     [facility]     ENDOSCOPIC ULTRASOUND UPPER GASTROINTESTINAL TRACT (GI) N/A 3/14/2019    Procedure: ENDOSCOPIC ULTRASOUND OF UPPER GASTROINTESTINAL TRACT;  Surgeon: Ju Torres MD;  Location:  OR     EXCISE MASS HEAD Left 8/18/2016    Procedure: EXCISE MASS HEAD;  Surgeon: Ivan Hernandez MD;  Location: [facility]     HEART CATH, ANGIOPLASTY  4/2012     LAPAROSCOPIC CHOLECYSTECTOMY N/A 3/17/2019    Procedure: LAPAROSCOPIC CHOLECYSTECTOMY;  Surgeon: Janel Paz MD;  Location:  OR     PHACOEMULSIFICATION CLEAR CORNEA WITH STANDARD INTRAOCULAR LENS IMPLANT Left 6/8/2015    Procedure: PHACOEMULSIFICATION CLEAR CORNEA WITH STANDARD INTRAOCULAR LENS IMPLANT;  Surgeon: Nixon Farnsworth MD;  Location:  [facility]     PHACOEMULSIFICATION CLEAR CORNEA WITH STANDARD INTRAOCULAR LENS IMPLANT Right 6/22/2015    Procedure: PHACOEMULSIFICATION CLEAR CORNEA WITH STANDARD INTRAOCULAR LENS IMPLANT;  Surgeon: Nixon Farnsworth MD;  Location:  [facility]     SEPTOPLASTY, TURBINOPLASTY, COMBINED Bilateral 8/18/2016    Procedure: COMBINED SEPTOPLASTY, TURBINOPLASTY;  Surgeon: Ivan Hernandez MD;  Location: [facility]       FAMILY HISTORY:  Family History   Problem Relation Age of Onset     Genitourinary Problems Father         d: age 89; ARF     Hypertension Father      Diabetes Mother         d: age 68, CAD     Heart Disease Mother         MI     Myocardial Infarction Mother      Cardiovascular Brother         d:  age 31; CAD     Heart Disease Brother         age 31, MI     Diabetes Sister         b:  1939; DM2       SOCIAL HISTORY:  Social History     Socioeconomic History     Marital status:      Spouse name: None     Number of children: None     Years of education: None     Highest education level: None   Occupational History     None   Social Needs     Financial resource strain: None     Food insecurity:     Worry: None      Inability: None     Transportation needs:     Medical: None     Non-medical: None   Tobacco Use     Smoking status: Former Smoker     Packs/day: 1.00     Years: 6.00     Pack years: 6.00     Start date:      Last attempt to quit: 1964     Years since quittin.4     Smokeless tobacco: Never Used   Substance and Sexual Activity     Alcohol use: Yes     Comment: couple drinks a year     Drug use: No     Sexual activity: Yes     Partners: Female   Lifestyle     Physical activity:     Days per week: None     Minutes per session: None     Stress: None   Relationships     Social connections:     Talks on phone: None     Gets together: None     Attends Yazdanism service: None     Active member of club or organization: None     Attends meetings of clubs or organizations: None     Relationship status: None     Intimate partner violence:     Fear of current or ex partner: None     Emotionally abused: None     Physically abused: None     Forced sexual activity: None   Other Topics Concern     Parent/sibling w/ CABG, MI or angioplasty before 65F 55M? Not Asked      Service Not Asked     Blood Transfusions Not Asked     Caffeine Concern Yes     Comment: 2 cups coffee a day     Occupational Exposure Not Asked     Hobby Hazards Not Asked     Sleep Concern Yes     Comment: sleep apnea, wears cpap off and on     Stress Concern No     Weight Concern No     Special Diet Yes     Comment: diabetic diet      Back Care Not Asked     Exercise No     Comment: occ walk the mall     Bike Helmet Not Asked     Seat Belt Yes     Self-Exams Not Asked   Social History Narrative     None       Review of Systems:  Skin:  Negative       Eyes:  Positive for glasses reading  ENT:  Negative      Respiratory:  Negative       Cardiovascular:    Positive for;chest pain    Gastroenterology: Negative   cholecystectomy 3/2019  Genitourinary:  Negative      Musculoskeletal:  Positive for joint pain right thumb  Neurologic:  Positive for  "numbness or tingling of feet    Psychiatric:  Negative      Heme/Lymph/Imm:  Negative      Endocrine:  Positive for thyroid disorder;diabetes;night sweats      Physical Exam:  Vitals: /68   Pulse 80   Ht 1.803 m (5' 11\")   Wt 88 kg (194 lb)   BMI 27.06 kg/m      Constitutional:  cooperative, alert and oriented, well developed, well nourished, in no acute distress        Skin:  warm and dry to the touch, no apparent skin lesions or masses noted          Head:  normocephalic, no masses or lesions        Eyes:  pupils equal and round, conjunctivae and lids unremarkable, sclera white, no xanthalasma, EOMS intact, no nystagmus        Lymph:      ENT:  no pallor or cyanosis, dentition good        Neck:  carotid pulses are full and equal bilaterally, JVP normal, no carotid bruit        Respiratory:  normal breath sounds, clear to auscultation, normal A-P diameter, normal symmetry, normal respiratory excursion, no use of accessory muscles         Cardiac: regular rhythm, normal S1/S2, no S3 or S4, apical impulse not displaced, no murmurs, gallops or rubs                pulses full and equal, no bruits auscultated                                        GI:  abdomen soft, non-tender, BS normoactive, no mass, no HSM, no bruits        Extremities and Muscular Skeletal:  no deformities, clubbing, cyanosis, erythema observed              Neurological:  no gross motor deficits        Psych:  Alert and Oriented x 3        CC  Rebekah Marrero PA-C  [address]  [place] [address]              "

## 2019-11-19 NOTE — PROGRESS NOTES
Pls let pt know that labs came back after appt with Dr. Jarvis. Has been on Amiodarone since 1/2019 for AFib.    1) Liver tests show the liver looks really irritated, just like it did back when he had gallbladder surgery 3/2019!     Is he having any abdominal discomfort?   STOP amiodarone. Pls update Epic.    Get repeat CMP in 4-6 weeks. I have ordered. We will contact with results    2) TSH and BMP were fine.    3) Call for recurrent AFib! Follow-up 3 months (I have ordered)     Thx - Joleen    Component      Latest Ref Rng & Units 3/22/2019 4/19/2019 9/30/2019 11/19/2019   Bilirubin Total      0.2 - 1.3 mg/dL 1.3 2.1 (H) 1.2 1.3   Albumin      3.4 - 5.0 g/dL 2.9 (L) 3.7 3.6 3.8   Protein Total      6.8 - 8.8 g/dL 5.9 (L) 6.6 (L) 6.4 (L) 6.9   Alkaline Phosphatase      40 - 150 U/L 336 (H) 196 (H) 95 201 (H)   ALT      0 - 70 U/L 91 (H) 46 39 137 (H)   AST      0 - 45 U/L 32 25 22 63 (H)   Bilirubin Direct      0.0 - 0.2 mg/dL    0.4 (H)     Component      Latest Ref Rng & Units 3/22/2019 4/19/2019 9/30/2019 11/19/2019   Sodium      133 - 144 mmol/L 140 141 139 136   Potassium      3.4 - 5.3 mmol/L 3.8 4.4 3.8 4.5   Chloride      94 - 109 mmol/L 107 105 103 105   Carbon Dioxide      20 - 32 mmol/L 28 30 33 (H) 27   Anion Gap      3 - 14 mmol/L 5 6 3 4   Glucose      70 - 99 mg/dL 124 (H) 113 (H) 157 (H) 309 (H)   Urea Nitrogen      7 - 30 mg/dL 12 14 11 22   Creatinine      0.66 - 1.25 mg/dL 1.35 (H) 1.40 (H) 1.04 1.17   GFR Estimate      >60 mL/min/1.73:m2 50 (L) 48 (L) 68 59 (L)   GFR Estimate If Black      >60 mL/min/1.73:m2 58 (L) 55 (L) 79 69   Calcium      8.5 - 10.1 mg/dL 9.7 9.8 8.9 9.4     Component      Latest Ref Rng & Units 9/30/2019 11/19/2019   TSH      0.40 - 4.00 mU/L 1.64 0.71

## 2019-12-20 ENCOUNTER — OFFICE VISIT (OUTPATIENT)
Dept: INTERNAL MEDICINE | Facility: CLINIC | Age: 79
End: 2019-12-20
Payer: COMMERCIAL

## 2019-12-20 ENCOUNTER — TELEPHONE (OUTPATIENT)
Dept: INTERNAL MEDICINE | Facility: CLINIC | Age: 79
End: 2019-12-20

## 2019-12-20 VITALS
TEMPERATURE: 98.1 F | BODY MASS INDEX: 26.78 KG/M2 | WEIGHT: 192 LBS | SYSTOLIC BLOOD PRESSURE: 128 MMHG | HEART RATE: 80 BPM | OXYGEN SATURATION: 99 % | DIASTOLIC BLOOD PRESSURE: 82 MMHG

## 2019-12-20 DIAGNOSIS — R97.20 ELEVATED PROSTATE SPECIFIC ANTIGEN (PSA): ICD-10-CM

## 2019-12-20 DIAGNOSIS — K21.9 GASTROESOPHAGEAL REFLUX DISEASE WITHOUT ESOPHAGITIS: Primary | ICD-10-CM

## 2019-12-20 DIAGNOSIS — Z79.4 TYPE 2 DIABETES MELLITUS WITHOUT COMPLICATION, WITH LONG-TERM CURRENT USE OF INSULIN (H): ICD-10-CM

## 2019-12-20 DIAGNOSIS — E11.9 TYPE 2 DIABETES MELLITUS WITHOUT COMPLICATION, WITH LONG-TERM CURRENT USE OF INSULIN (H): ICD-10-CM

## 2019-12-20 DIAGNOSIS — E34.9 TESTOSTERONE DEFICIENCY: ICD-10-CM

## 2019-12-20 DIAGNOSIS — K75.9 HEPATITIS: Primary | ICD-10-CM

## 2019-12-20 DIAGNOSIS — R74.8 ELEVATED LIVER ENZYMES: ICD-10-CM

## 2019-12-20 LAB
ALBUMIN SERPL-MCNC: 3.7 G/DL (ref 3.4–5)
ALP SERPL-CCNC: 153 U/L (ref 40–150)
ALT SERPL W P-5'-P-CCNC: 74 U/L (ref 0–70)
ANION GAP SERPL CALCULATED.3IONS-SCNC: 3 MMOL/L (ref 3–14)
AST SERPL W P-5'-P-CCNC: 38 U/L (ref 0–45)
BILIRUB SERPL-MCNC: 1.3 MG/DL (ref 0.2–1.3)
BUN SERPL-MCNC: 13 MG/DL (ref 7–30)
CALCIUM SERPL-MCNC: 10 MG/DL (ref 8.5–10.1)
CHLORIDE SERPL-SCNC: 102 MMOL/L (ref 94–109)
CO2 SERPL-SCNC: 30 MMOL/L (ref 20–32)
CREAT SERPL-MCNC: 1.02 MG/DL (ref 0.66–1.25)
GFR SERPL CREATININE-BSD FRML MDRD: 70 ML/MIN/{1.73_M2}
GLUCOSE SERPL-MCNC: 275 MG/DL (ref 70–99)
POTASSIUM SERPL-SCNC: 4 MMOL/L (ref 3.4–5.3)
PROT SERPL-MCNC: 6.9 G/DL (ref 6.8–8.8)
SODIUM SERPL-SCNC: 135 MMOL/L (ref 133–144)

## 2019-12-20 PROCEDURE — 36415 COLL VENOUS BLD VENIPUNCTURE: CPT | Performed by: PHYSICIAN ASSISTANT

## 2019-12-20 PROCEDURE — 80053 COMPREHEN METABOLIC PANEL: CPT | Performed by: PHYSICIAN ASSISTANT

## 2019-12-20 PROCEDURE — 99214 OFFICE O/P EST MOD 30 MIN: CPT | Performed by: INTERNAL MEDICINE

## 2019-12-20 RX ORDER — FAMOTIDINE 20 MG/1
20 TABLET, FILM COATED ORAL 2 TIMES DAILY
Qty: 180 TABLET | Refills: 3 | Status: ON HOLD | OUTPATIENT
Start: 2019-12-20 | End: 2020-01-09

## 2019-12-20 NOTE — PATIENT INSTRUCTIONS
Increase Levemir  to 30 units daily at bedtime  Check sugars in AM before meal. If Am sugars > 130, then increase Levemir by 1 unit  Every  Other day until AM sugar < 130 and then stay at that dose of Levemir   Once AM sugar < 130 for 2 days, then Check blood sugars four times a day (before meals and bedtime) for  4 days and send me the results in [email or web address] with a message or call nurseline [phone]. Give those results and also the dose of Levemir  Being used  Nonfasting lab in 2 weeks ( blood and urine) for liver and diabetes

## 2019-12-20 NOTE — PROGRESS NOTES
Subjective     Pascual Perea is a 79 year old male who presents to clinic today for the following health issues:    HPI   Results:  Here to go over lab results done earlier this AM     Pt's past medical history, family history, habits, medications and allergies were reviewed with the patient today.  See snap shot for  HCM status. Most recent lab results reviewed with pt. Problem list and histories reviewed & adjusted, as indicated.  Additional history as below:    Recent hepatitis found on labs. Patient had repeat lab today with improvement..  Only new medications recently have been use of doxycycline twice a 10-day prescription and a 14-day prescription for prostatitis with elevated PSA.  Patient denies nausea, vomiting abdominal pain.  Previous prostatitis/urinary hesitancy symptoms have resolved.  Patient was taken off of testosterone after previous elevated PSA and testosterone levels being elevated.  Patient is also off of metformin after some previous diarrhea.  Loose stools resolved with stopping metformin.  However, patient had cut back on his insulin dosing prior to starting metformin as blood sugars were doing well and never increased insulin dosing after the metformin was stopped.  He has not been checking blood sugars recently and today's and other most recent blood sugar results on labs show need for improved diabetic control again.  Blood sugars have been well controlled using metformin with insulin.  Patient denies chest pain, shortness of breath.    Component      Latest Ref Rng & Units 9/30/2019 11/19/2019 12/20/2019   Sodium      133 - 144 mmol/L 139 136 135   Potassium      3.4 - 5.3 mmol/L 3.8 4.5 4.0   Chloride      94 - 109 mmol/L 103 105 102   Carbon Dioxide      20 - 32 mmol/L 33 (H) 27 30   Anion Gap      3 - 14 mmol/L 3 4 3   Glucose      70 - 99 mg/dL 157 (H) 309 (H) 275 (H)   Urea Nitrogen      7 - 30 mg/dL 11 22 13   Creatinine      0.66 - 1.25 mg/dL 1.04 1.17 1.02   GFR Estimate      " >60 mL/min/1.73:m2 68 59 (L) 70   GFR Estimate If Black      >60 mL/min/1.73:m2 79 69 81   Calcium      8.5 - 10.1 mg/dL 8.9 9.4 10.0   Bilirubin Total      0.2 - 1.3 mg/dL 1.2 1.3 1.3   Albumin      3.4 - 5.0 g/dL 3.6 3.8 3.7   Protein Total      6.8 - 8.8 g/dL 6.4 (L) 6.9 6.9   Alkaline Phosphatase      40 - 150 U/L 95 201 (H) 153 (H)   ALT      0 - 70 U/L 39 137 (H) 74 (H)   AST      0 - 45 U/L 22 63 (H) 38   Bilirubin Direct      0.0 - 0.2 mg/dL  0.4 (H)    Hemoglobin A1C      0 - 5.6 % 7.0 (H)     PSA      0 - 4 ug/L 4.81 (H)     TSH      0.40 - 4.00 mU/L  0.71       Additional ROS:   Constitutional, HEENT, Cardiovascular, Pulmonary, GI and , Neuro, MSK and Psych review of systems/symptoms are otherwise negative or unchanged from previous, except as noted above.      OBJECTIVE:  /82   Pulse 80   Temp 98.1  F (36.7  C) (Oral)   Wt 87.1 kg (192 lb)   SpO2 99%   BMI 26.78 kg/m     Estimated body mass index is 26.78 kg/m  as calculated from the following:    Height as of 11/19/19: 1.803 m (5' 11\").    Weight as of this encounter: 87.1 kg (192 lb).     Pulm: Lungs clear to auscultation   CV: Regular rates and rhythm  GI: Soft, nontender, Normal active bowel sounds, No hepatosplenomegaly or masses palpable  Ext: Peripheral pulses intact. No edema.  Rectal: deferred    Assessment/Plan: (See plan discussion below for further details)  1. Hepatitis  Improved.  Possibly related to previous doxycycline use.  Patient off that antibiotic currently.  Recheck liver test in 2 weeks  - Comprehensive metabolic panel; Future    2. Type 2 diabetes mellitus without complication, with long-term current use of insulin (H)  Blood sugars now elevated having previously stopped metformin.  Patient will increase Levemir until morning blood sugars less than 130 and then send me blood sugar results and will then titrate up NovoLog dosing in addition as needed  - Comprehensive metabolic panel; Future  - insulin detemir (LEVEMIR " FLEXPEN/FLEXTOUCH) 100 UNIT/ML pen; Inject 30-35 Units Subcutaneous At Bedtime  Dispense: 45 mL; Refill: 3    3. Elevated prostate specific antigen (PSA)   ? Related to prostatitis vs testosterone use versus prostate CA. No nodule felt on previous  MARCELLA.  Will recheck PSA in 2 weeks as previously ordered.  Further management will be based on lab result    4. Testosterone deficiency  Remain off of testosterone replacement for now.  Reconsider in the future once prostate issue resolved    Plan discussion:  Increase Levemir  to 30 units daily at bedtime  Check sugars in AM before meal. If Am sugars > 130, then increase Levemir by 1 unit  Every  Other day until AM sugar < 130 and then stay at that dose of Levemir   Once AM sugar < 130 for 2 days, then Check blood sugars four times a day (before meals and bedtime) for  4 days and send me the results in [employer] with a message or call nurseline [phone]. Give those results and also the dose of Levemir  Being used  Nonfasting lab in 2 weeks ( blood and urine) for liver and diabetes and PSA       Andrew Elizalde MD  Internal Medicine Department  [facility]    (Chart documentation was completed, in part, with ActiveRain voice-recognition software. Even though reviewed, some grammatical, spelling, and word errors may remain.)

## 2019-12-20 NOTE — TELEPHONE ENCOUNTER
All Ranitidine has been recalled now.  May we please have a rx for Famotidine or an alternative.     Thanks,  Tran Bishop, [facility]   [phone]

## 2019-12-23 DIAGNOSIS — Z79.4 TYPE 2 DIABETES MELLITUS WITHOUT COMPLICATION, WITH LONG-TERM CURRENT USE OF INSULIN (H): ICD-10-CM

## 2019-12-23 DIAGNOSIS — E11.9 TYPE 2 DIABETES MELLITUS WITHOUT COMPLICATION, WITH LONG-TERM CURRENT USE OF INSULIN (H): ICD-10-CM

## 2019-12-23 NOTE — TELEPHONE ENCOUNTER
"Requested Prescriptions   Pending Prescriptions Disp Refills     NOVOFINE 30 30G X 8 MM insulin pen needle [Pharmacy Med Name: NOVOFINE AUTOCOVER 30G X 8 MM MISC] 400 each 1     Sig: USE FOUR TIMES DAILY. PATIENT NEEDS OFFICE VISIT       Diabetic Supplies Protocol Failed - 12/23/2019 10:03 AM        Failed - Medication is active on med list        Passed - Patient is 18 years of age or older        Passed - Recent (6 mo) or future (30 days) visit within the authorizing provider's specialty     Patient had office visit in the last 6 months or has a visit in the next 30 days with authorizing provider.  See \"Patient Info\" tab in inbasket, or \"Choose Columns\" in Meds & Orders section of the refill encounter.              "

## 2019-12-30 DIAGNOSIS — R80.9 PROTEINURIA, UNSPECIFIED TYPE: ICD-10-CM

## 2019-12-30 DIAGNOSIS — K75.9 HEPATITIS: ICD-10-CM

## 2019-12-30 DIAGNOSIS — Z12.5 ENCOUNTER FOR SCREENING FOR MALIGNANT NEOPLASM OF PROSTATE: ICD-10-CM

## 2019-12-30 DIAGNOSIS — E11.9 TYPE 2 DIABETES MELLITUS WITHOUT COMPLICATION, WITH LONG-TERM CURRENT USE OF INSULIN (H): ICD-10-CM

## 2019-12-30 DIAGNOSIS — Z79.4 TYPE 2 DIABETES MELLITUS WITHOUT COMPLICATION, WITH LONG-TERM CURRENT USE OF INSULIN (H): ICD-10-CM

## 2019-12-30 LAB
ALBUMIN SERPL-MCNC: 3.8 G/DL (ref 3.4–5)
ALP SERPL-CCNC: 155 U/L (ref 40–150)
ALT SERPL W P-5'-P-CCNC: 86 U/L (ref 0–70)
ANION GAP SERPL CALCULATED.3IONS-SCNC: 3 MMOL/L (ref 3–14)
AST SERPL W P-5'-P-CCNC: 45 U/L (ref 0–45)
BILIRUB SERPL-MCNC: 1.2 MG/DL (ref 0.2–1.3)
BUN SERPL-MCNC: 23 MG/DL (ref 7–30)
CALCIUM SERPL-MCNC: 10.1 MG/DL (ref 8.5–10.1)
CHLORIDE SERPL-SCNC: 103 MMOL/L (ref 94–109)
CO2 SERPL-SCNC: 29 MMOL/L (ref 20–32)
CREAT SERPL-MCNC: 1.17 MG/DL (ref 0.66–1.25)
CREAT UR-MCNC: 59 MG/DL
GFR SERPL CREATININE-BSD FRML MDRD: 59 ML/MIN/{1.73_M2}
GLUCOSE SERPL-MCNC: 393 MG/DL (ref 70–99)
HBA1C MFR BLD: 9.4 % (ref 0–5.6)
MICROALBUMIN UR-MCNC: <5 MG/L
MICROALBUMIN/CREAT UR: NORMAL MG/G CR (ref 0–17)
POTASSIUM SERPL-SCNC: 4.8 MMOL/L (ref 3.4–5.3)
PROT SERPL-MCNC: 6.7 G/DL (ref 6.8–8.8)
PSA SERPL-ACNC: 0.48 UG/L (ref 0–4)
SODIUM SERPL-SCNC: 134 MMOL/L (ref 133–144)

## 2019-12-30 PROCEDURE — 80053 COMPREHEN METABOLIC PANEL: CPT | Performed by: INTERNAL MEDICINE

## 2019-12-30 PROCEDURE — 83036 HEMOGLOBIN GLYCOSYLATED A1C: CPT | Performed by: INTERNAL MEDICINE

## 2019-12-30 PROCEDURE — 82043 UR ALBUMIN QUANTITATIVE: CPT | Performed by: INTERNAL MEDICINE

## 2019-12-30 PROCEDURE — 36415 COLL VENOUS BLD VENIPUNCTURE: CPT | Performed by: INTERNAL MEDICINE

## 2019-12-30 PROCEDURE — G0103 PSA SCREENING: HCPCS | Performed by: INTERNAL MEDICINE

## 2020-01-07 ENCOUNTER — MYC MEDICAL ADVICE (OUTPATIENT)
Dept: CARDIOLOGY | Facility: CLINIC | Age: 80
End: 2020-01-07

## 2020-01-08 ENCOUNTER — HOSPITAL ENCOUNTER (OUTPATIENT)
Facility: CLINIC | Age: 80
Setting detail: OBSERVATION
Discharge: HOME | End: 2020-01-09
Attending: EMERGENCY MEDICINE | Admitting: HOSPITALIST
Payer: COMMERCIAL

## 2020-01-08 ENCOUNTER — APPOINTMENT (OUTPATIENT)
Dept: GENERAL RADIOLOGY | Facility: CLINIC | Age: 80
End: 2020-01-08
Attending: EMERGENCY MEDICINE
Payer: COMMERCIAL

## 2020-01-08 DIAGNOSIS — R07.9 CHEST PAIN, UNSPECIFIED TYPE: Primary | ICD-10-CM

## 2020-01-08 DIAGNOSIS — I48.91 ATRIAL FIBRILLATION, UNSPECIFIED TYPE (H): ICD-10-CM

## 2020-01-08 DIAGNOSIS — Z79.4 TYPE 2 DIABETES MELLITUS WITHOUT COMPLICATION, WITH LONG-TERM CURRENT USE OF INSULIN (H): ICD-10-CM

## 2020-01-08 DIAGNOSIS — Z95.1 HX OF CABG: ICD-10-CM

## 2020-01-08 DIAGNOSIS — E11.9 TYPE 2 DIABETES MELLITUS WITHOUT COMPLICATION, WITH LONG-TERM CURRENT USE OF INSULIN (H): ICD-10-CM

## 2020-01-08 DIAGNOSIS — K21.9 GASTROESOPHAGEAL REFLUX DISEASE WITHOUT ESOPHAGITIS: ICD-10-CM

## 2020-01-08 LAB
ALBUMIN SERPL-MCNC: 4 G/DL (ref 3.4–5)
ALP SERPL-CCNC: 132 U/L (ref 40–150)
ALT SERPL W P-5'-P-CCNC: 74 U/L (ref 0–70)
ANION GAP SERPL CALCULATED.3IONS-SCNC: 4 MMOL/L (ref 3–14)
AST SERPL W P-5'-P-CCNC: 42 U/L (ref 0–45)
BASOPHILS # BLD AUTO: 0 10E9/L (ref 0–0.2)
BASOPHILS NFR BLD AUTO: 0.6 %
BILIRUB DIRECT SERPL-MCNC: 0.1 MG/DL (ref 0–0.2)
BILIRUB SERPL-MCNC: 0.9 MG/DL (ref 0.2–1.3)
BUN SERPL-MCNC: 23 MG/DL (ref 7–30)
CALCIUM SERPL-MCNC: 10.2 MG/DL (ref 8.5–10.1)
CHLORIDE SERPL-SCNC: 107 MMOL/L (ref 94–109)
CK SERPL-CCNC: 90 U/L (ref 30–300)
CO2 SERPL-SCNC: 29 MMOL/L (ref 20–32)
CREAT SERPL-MCNC: 1.29 MG/DL (ref 0.66–1.25)
DIFFERENTIAL METHOD BLD: ABNORMAL
EOSINOPHIL # BLD AUTO: 0.2 10E9/L (ref 0–0.7)
EOSINOPHIL NFR BLD AUTO: 3.5 %
ERYTHROCYTE [DISTWIDTH] IN BLOOD BY AUTOMATED COUNT: 13.6 % (ref 10–15)
GFR SERPL CREATININE-BSD FRML MDRD: 53 ML/MIN/{1.73_M2}
GLUCOSE BLDC GLUCOMTR-MCNC: 135 MG/DL (ref 70–99)
GLUCOSE BLDC GLUCOMTR-MCNC: 209 MG/DL (ref 70–99)
GLUCOSE SERPL-MCNC: 312 MG/DL (ref 70–99)
HBA1C MFR BLD: 8.5 % (ref 0–5.6)
HCT VFR BLD AUTO: 37.4 % (ref 40–53)
HGB BLD-MCNC: 13 G/DL (ref 13.3–17.7)
IMM GRANULOCYTES # BLD: 0 10E9/L (ref 0–0.4)
IMM GRANULOCYTES NFR BLD: 0.2 %
INTERPRETATION ECG - MUSE: NORMAL
LYMPHOCYTES # BLD AUTO: 1 10E9/L (ref 0.8–5.3)
LYMPHOCYTES NFR BLD AUTO: 20.7 %
MCH RBC QN AUTO: 30.2 PG (ref 26.5–33)
MCHC RBC AUTO-ENTMCNC: 34.8 G/DL (ref 31.5–36.5)
MCV RBC AUTO: 87 FL (ref 78–100)
MONOCYTES # BLD AUTO: 0.2 10E9/L (ref 0–1.3)
MONOCYTES NFR BLD AUTO: 4.8 %
NEUTROPHILS # BLD AUTO: 3.3 10E9/L (ref 1.6–8.3)
NEUTROPHILS NFR BLD AUTO: 70.2 %
NRBC # BLD AUTO: 0 10*3/UL
NRBC BLD AUTO-RTO: 0 /100
NT-PROBNP SERPL-MCNC: 128 PG/ML (ref 0–1800)
PLATELET # BLD AUTO: 181 10E9/L (ref 150–450)
POTASSIUM SERPL-SCNC: 4.3 MMOL/L (ref 3.4–5.3)
PROT SERPL-MCNC: 6.8 G/DL (ref 6.8–8.8)
RBC # BLD AUTO: 4.31 10E12/L (ref 4.4–5.9)
SODIUM SERPL-SCNC: 140 MMOL/L (ref 133–144)
TROPONIN I SERPL-MCNC: <0.015 UG/L (ref 0–0.04)
TROPONIN I SERPL-MCNC: <0.015 UG/L (ref 0–0.04)
TSH SERPL DL<=0.005 MIU/L-ACNC: 0.44 MU/L (ref 0.4–4)
WBC # BLD AUTO: 4.6 10E9/L (ref 4–11)

## 2020-01-08 PROCEDURE — 96360 HYDRATION IV INFUSION INIT: CPT

## 2020-01-08 PROCEDURE — 96372 THER/PROPH/DIAG INJ SC/IM: CPT

## 2020-01-08 PROCEDURE — 93005 ELECTROCARDIOGRAM TRACING: CPT

## 2020-01-08 PROCEDURE — 83880 ASSAY OF NATRIURETIC PEPTIDE: CPT | Performed by: EMERGENCY MEDICINE

## 2020-01-08 PROCEDURE — 82550 ASSAY OF CK (CPK): CPT | Performed by: EMERGENCY MEDICINE

## 2020-01-08 PROCEDURE — 25800030 ZZH RX IP 258 OP 636: Performed by: EMERGENCY MEDICINE

## 2020-01-08 PROCEDURE — 25000132 ZZH RX MED GY IP 250 OP 250 PS 637: Performed by: HOSPITALIST

## 2020-01-08 PROCEDURE — 25000131 ZZH RX MED GY IP 250 OP 636 PS 637: Performed by: HOSPITALIST

## 2020-01-08 PROCEDURE — 25000132 ZZH RX MED GY IP 250 OP 250 PS 637: Performed by: EMERGENCY MEDICINE

## 2020-01-08 PROCEDURE — 80048 BASIC METABOLIC PNL TOTAL CA: CPT | Performed by: EMERGENCY MEDICINE

## 2020-01-08 PROCEDURE — 99285 EMERGENCY DEPT VISIT HI MDM: CPT | Mod: 25

## 2020-01-08 PROCEDURE — 83036 HEMOGLOBIN GLYCOSYLATED A1C: CPT | Performed by: EMERGENCY MEDICINE

## 2020-01-08 PROCEDURE — 84484 ASSAY OF TROPONIN QUANT: CPT | Performed by: EMERGENCY MEDICINE

## 2020-01-08 PROCEDURE — 84443 ASSAY THYROID STIM HORMONE: CPT | Performed by: EMERGENCY MEDICINE

## 2020-01-08 PROCEDURE — G0378 HOSPITAL OBSERVATION PER HR: HCPCS

## 2020-01-08 PROCEDURE — 71046 X-RAY EXAM CHEST 2 VIEWS: CPT

## 2020-01-08 PROCEDURE — 84484 ASSAY OF TROPONIN QUANT: CPT | Mod: 91 | Performed by: HOSPITALIST

## 2020-01-08 PROCEDURE — 85025 COMPLETE CBC W/AUTO DIFF WBC: CPT | Performed by: EMERGENCY MEDICINE

## 2020-01-08 PROCEDURE — 00000146 ZZHCL STATISTIC GLUCOSE BY METER IP

## 2020-01-08 PROCEDURE — 80076 HEPATIC FUNCTION PANEL: CPT | Performed by: EMERGENCY MEDICINE

## 2020-01-08 PROCEDURE — 36415 COLL VENOUS BLD VENIPUNCTURE: CPT | Performed by: HOSPITALIST

## 2020-01-08 PROCEDURE — 99220 ZZC INITIAL OBSERVATION CARE,LEVL III: CPT | Performed by: HOSPITALIST

## 2020-01-08 PROCEDURE — 96361 HYDRATE IV INFUSION ADD-ON: CPT

## 2020-01-08 RX ORDER — NITROGLYCERIN 0.4 MG/1
0.4 TABLET SUBLINGUAL EVERY 5 MIN PRN
Status: DISCONTINUED | OUTPATIENT
Start: 2020-01-08 | End: 2020-01-08

## 2020-01-08 RX ORDER — LIDOCAINE 40 MG/G
CREAM TOPICAL
Status: DISCONTINUED | OUTPATIENT
Start: 2020-01-08 | End: 2020-01-09 | Stop reason: HOSPADM

## 2020-01-08 RX ORDER — ASPIRIN 325 MG
325 TABLET ORAL ONCE
Status: COMPLETED | OUTPATIENT
Start: 2020-01-08 | End: 2020-01-08

## 2020-01-08 RX ORDER — NICOTINE POLACRILEX 4 MG
15-30 LOZENGE BUCCAL
Status: DISCONTINUED | OUTPATIENT
Start: 2020-01-08 | End: 2020-01-09 | Stop reason: HOSPADM

## 2020-01-08 RX ORDER — FAMOTIDINE 20 MG/1
20 TABLET, FILM COATED ORAL 2 TIMES DAILY
Status: DISCONTINUED | OUTPATIENT
Start: 2020-01-09 | End: 2020-01-09 | Stop reason: HOSPADM

## 2020-01-08 RX ORDER — NITROGLYCERIN 0.4 MG/1
0.4 TABLET SUBLINGUAL EVERY 5 MIN PRN
Status: DISCONTINUED | OUTPATIENT
Start: 2020-01-08 | End: 2020-01-09 | Stop reason: HOSPADM

## 2020-01-08 RX ORDER — ACETAMINOPHEN 650 MG/1
650 SUPPOSITORY RECTAL EVERY 4 HOURS PRN
Status: DISCONTINUED | OUTPATIENT
Start: 2020-01-08 | End: 2020-01-09 | Stop reason: HOSPADM

## 2020-01-08 RX ORDER — NALOXONE HYDROCHLORIDE 0.4 MG/ML
.1-.4 INJECTION, SOLUTION INTRAMUSCULAR; INTRAVENOUS; SUBCUTANEOUS
Status: DISCONTINUED | OUTPATIENT
Start: 2020-01-08 | End: 2020-01-09 | Stop reason: HOSPADM

## 2020-01-08 RX ORDER — ALUMINA, MAGNESIA, AND SIMETHICONE 2400; 2400; 240 MG/30ML; MG/30ML; MG/30ML
15 SUSPENSION ORAL EVERY 4 HOURS PRN
Status: DISCONTINUED | OUTPATIENT
Start: 2020-01-08 | End: 2020-01-09 | Stop reason: HOSPADM

## 2020-01-08 RX ORDER — DEXTROSE MONOHYDRATE 25 G/50ML
25-50 INJECTION, SOLUTION INTRAVENOUS
Status: DISCONTINUED | OUTPATIENT
Start: 2020-01-08 | End: 2020-01-09 | Stop reason: HOSPADM

## 2020-01-08 RX ORDER — ACETAMINOPHEN 325 MG/1
650 TABLET ORAL EVERY 4 HOURS PRN
Status: DISCONTINUED | OUTPATIENT
Start: 2020-01-08 | End: 2020-01-09 | Stop reason: HOSPADM

## 2020-01-08 RX ADMIN — APIXABAN 5 MG: 5 TABLET, FILM COATED ORAL at 20:14

## 2020-01-08 RX ADMIN — ASPIRIN 325 MG ORAL TABLET 325 MG: 325 PILL ORAL at 15:29

## 2020-01-08 RX ADMIN — SODIUM CHLORIDE 500 ML: 9 INJECTION, SOLUTION INTRAVENOUS at 15:33

## 2020-01-08 RX ADMIN — INSULIN ASPART 5 UNITS: 100 INJECTION, SOLUTION INTRAVENOUS; SUBCUTANEOUS at 20:14

## 2020-01-08 RX ADMIN — INSULIN GLARGINE 10 UNITS: 100 INJECTION, SOLUTION SUBCUTANEOUS at 20:14

## 2020-01-08 RX ADMIN — INSULIN ASPART 1 UNITS: 100 INJECTION, SOLUTION INTRAVENOUS; SUBCUTANEOUS at 22:46

## 2020-01-08 ASSESSMENT — MIFFLIN-ST. JEOR
SCORE: 1622.87
SCORE: 1585.82

## 2020-01-08 ASSESSMENT — ENCOUNTER SYMPTOMS
FATIGUE: 1
FEVER: 0
NAUSEA: 0
VOMITING: 0
SHORTNESS OF BREATH: 1
ABDOMINAL PAIN: 0

## 2020-01-08 NOTE — PROGRESS NOTES
RECEIVING UNIT ED HANDOFF REVIEW    ED Nurse Handoff Report was reviewed by: Daija Sim RN on January 8, 2020 at 5:54 PM

## 2020-01-08 NOTE — ED TRIAGE NOTES
"Patient states he is feeling short of breath and fatigue. He was sent here by his cardiologist because is has a history of afib and describes \"an ache\" in his chest.   "

## 2020-01-08 NOTE — TELEPHONE ENCOUNTER
"01/08/20 Recd Randyhart message from pt. Called pt back to obtain more information. He stated that on Friday night (01/03) pt had a dizzy spell and felt like his heart   \"tipped upside down\". Since then he feels like he has been in and out of Afib and has felt very fatigued. Also has had associated chest, shoulder and arm pain and overall does not feel well. Pt does not monitor heart rates or Bps at home. Enc patient to head to ED as he is having described chest /arm/shoulder pain along with episodes of Afib. Pt voiced understanding and agreement w plan. Stated he will call son/daughter to drive him. Christian 1035 am  "

## 2020-01-08 NOTE — PHARMACY-ADMISSION MEDICATION HISTORY
Pharmacy Medication History  Admission medication history interview status for the 1/8/2020  admission is complete. See EPIC admission navigator for prior to admission medications     Medication history sources: Patient  Medication history source reliability: Good  Adherence assessment: Good    Significant changes made to the medication list:  none      Additional medication history information:   none    Medication reconciliation completed by provider prior to medication history? No    Time spent in this activity: 15min      Prior to Admission medications    Medication Sig Last Dose Taking? Auth Provider   apixaban ANTICOAGULANT (ELIQUIS) 5 MG tablet Take 1 tablet (5 mg) by mouth 2 times daily 1/8/2020 at Unknown time Yes Satnam Jarvis MD   azelastine (ASTELIN) 0.1 % nasal spray Spray 2 sprays into both nostrils 2 times daily 1/8/2020 at Unknown time Yes Andrew Elizalde MD   Cholecalciferol (VITAMIN D3 PO) Take 5,000 Units by mouth daily 1/8/2020 at Unknown time Yes Unknown, Entered By History   famotidine (PEPCID) 20 MG tablet Take 1 tablet (20 mg) by mouth 2 times daily 1/8/2020 at Unknown time Yes Andrew Elizalde MD   fexofenadine (ALLEGRA) 180 MG tablet Take 1 tablet (180 mg) by mouth daily 1/8/2020 at Unknown time Yes Andrew Elizalde MD   furosemide (LASIX) 20 MG tablet TAKE ONE TABLET BY MOUTH ONCE DAILY 1/8/2020 at Unknown time Yes Andrew Elizalde MD   glucose 4 g CHEW Take 1 tablet by mouth every hour as needed for low blood sugar prn Yes Unknown, Entered By History   insulin aspart (NOVOLOG FLEXPEN) 100 UNIT/ML pen Inject 3 times daily (before meals). 2 units per carb choice (15gm). Approx 5-10 units/meal. 1/8/2020 at Unknown time Yes Andrew Elizalde MD   insulin detemir (LEVEMIR FLEXPEN/FLEXTOUCH) 100 UNIT/ML pen Inject 30-35 Units Subcutaneous At Bedtime 1/7/2020 at Unknown time Yes Andrew Elizalde MD   levothyroxine (SYNTHROID/LEVOTHROID) 125 MCG tablet Take 1 tablet (125 mcg) by mouth daily 1/8/2020 at Unknown  time Yes Andrew Elizalde MD   lisinopril (PRINIVIL/ZESTRIL) 30 MG tablet Take 1 tablet (30 mg) by mouth daily 1/8/2020 at Unknown time Yes Rebekah Marrero PA-C   nitroglycerin (NITROSTAT) 0.4 MG SL tablet Place 1 tablet (0.4 mg) under the tongue every 5 minutes as needed for chest pain prn Yes Speedy Olivas MD   pravastatin (PRAVACHOL) 80 MG tablet Take 1 tablet (80 mg) by mouth At Bedtime 1/7/2020 at Unknown time Yes Rebekah Marrero PA-C   NOVOFINE 30 30G X 8 MM insulin pen needle USE FOUR TIMES DAILY. PATIENT NEEDS OFFICE VISIT   Andrew Elizalde MD

## 2020-01-08 NOTE — H&P
[facility]    History and Physical  Hospitalist    Pascual Perea MRN# [id number]   Age: 79 year old YOB: 1940     Date of Admission:  1/8/2020    Primary care provider: Andrew Elizalde          Assessment and Plan:       Pascual Perea is a 79 year old  male with medical history of coronary artery disease status post CABG, atrial fibrillation on anticoagulation, hypertension, hyperlipidemia, uncontrolled diabetes mellitus presented to the ED with shortness of breath and fatigue.    Atypical chest pain, SOB rule out acute coronary syndrome.  Coronary artery disease status post CABG over 20 years ago.  Hypertension.  Per patient report symptoms started on 1/3/2019 when he was getting ready for bed, had taken 4 steps felt pressure in his head and felt out of it.  Subsequently had pain radiating down his chest and down his legs out of his body.  Reports after that episode continue to have pressure in his head, chest pain, shoulder pain, arm pain, back pain ongoing for 5 days now.  Had noted increased fatigue, shortness of breath today.  Able to carry on activities of daily living, lives independently.  In ED afebrile, heart rate in 70s to 80s.  Physical exam nonrevealing.  WBC 4.6, troponin less than 0.015, TSH 0.44, . Chest x-ray no acute pathology.  EKG sinus rhythm with first-degree AV block.  ST-T wave changes in inferior, lateral leads slightly more prominent when compared to previous EKG..  Echo 4/2019 with estimated EF of 55%, mildly decreased right ventricular systolic function.  Mild aortic stenosis.      Admit to observation unit.  Telemetry monitoring overnight.  We will trend troponin.  Stress echocardiogram given atypical chest pain.  Discussed with patient and family work-up negative will need to follow-up with cardiology as outpatient.  Continue PTA Eliquis.  PTA not on beta-blocker therapy.  Continue PTA lisinopril, hold PTA Lasix.  Hold PTA pravastatin while  under observation status, LFTs pending.    Atrial fibrillation on Eliquis.  Telemetry monitoring.  TSH 0.44.  Magnesium levels pending.  Continue PTA Eliquis.  Received 1 dose of aspirin in the ED.    Acute kidney injury likely prerenal:   CKD stage II  Baseline creatinine around 1-1.1.  Presented with creatinine of 1.2.  Continue PTA lisinopril.  Hold PTA Lasix.  Avoid nephrotoxic drugs.    Hyperglycemia from uncontrolled diabetes.  Uncontrolled diabetes mellitus with a hemoglobin A1c of 9.4.  Presented with blood sugars of 312.  At home on insulin aspart with carb counting.  With hyperglycemia with uncontrolled diabetes will start on insulin Lantus 10 units twice daily.  Insulin aspart 5 units 3 times daily started.  High insulin intensity sliding scale overnight.  Optimize insulin regimen prior to discharge.    Physical deconditioning likely from senile fragility, ongoing illness.  Lives independently, carries on all activities of daily living independently.  Ambulate a.m. with floor staff, if any concerns will consider PT.  We will check CPK, magnesium levels.    Hypothyroidism.  Hold PTA levothyroxine while under observation status.    Hyperlipidemia.  Hold PTA pravastatin while under observation status, check liver function test given recent deranged LFTs.    DVT Prophylaxis: SCD, on anticoagulation.  Code Status: Full code.  Discussed with patient, his family by bedside.    Disposition: Expected dischargelikely tomorrow pending work-up.  Discussed with patient, family by bedside, ED team.    Anthony Cruz MD          Chief Complaint:     History is obtained from patient, his son, daughter by the bedside, ED team and review of medical records.    Pascual Perea is a 79 year old  male with medical history of coronary artery disease status post CABG, atrial fibrillation on anticoagulation, hypertension, hyperlipidemia, uncontrolled diabetes mellitus presented to the ED with shortness of breath and  fatigue.    Per patient report symptoms started on 1/3/2019 when he was getting ready for bed, had taken 4 steps felt pressure in his head and felt out of it.  Subsequently had pain radiating down his chest and down his legs out of his body.  Reports after that episode continue to have pressure in his head, chest pain, shoulder pain, arm pain, back pain ongoing for 5 days now.  Chest pain mid chest 2/10 in intensity.  No fever or chills.  Had noted increased fatigue, shortness of breath today.  Able to carry on activities of daily living, lives independently.  Uses 1 pillow to sleep at night, no new cough, no wheezing.  No leg swelling.  No recent travel, no exposure to sick contacts.  No abdominal pain, no diarrhea or constipation.  No new tingling numbness or weakness in hands and legs.  No new joint pains.  No focal weakness.    In ED afebrile, heart rate in 70s to 80s.  Physical exam nonrevealing.  WBC 4.6, troponin less than 0.015, TSH 0.44, .  EKG sinus rhythm with first-degree AV block.  ST-T wave changes in inferior, lateral leads slightly more prominent when compared to previous EKG..  Chest x-ray no acute pathology.          Review of Systems:     GENERAL: no fever or chills  EENT: No new vision changes, no difficulty swallowing, no hearing difficulty  PULMONARY: no cough, no wheezing  CARDIAC: nno irregular or fast heart beats   GI: No  nausea, vomiting, diarrhea, constipation, black or bloody stools  : No burning/pain with urination  NEURO: No significant headaches,no dizziness  ENDOCRINE: No excessive thirst,  MUSCULOSKELETAL: No new joint pain  SKIN: No skin rashes  PSYCHIATRY no new anxiety    Medical History:     Past Medical History:   Diagnosis Date     Adhesive capsulitis of shoulder      Anemia 3/10/2014     Anemia, unspecified      Antiplatelet or antithrombotic long-term use      Arrhythmia     Atrial fib/flutter     CAD (coronary artery disease)      History of cardioversion  04/24/2018    a single synchronized shock of 120 joules restored normal sinus rhythm     History of cardioversion 02/13/2019    single shock , 200 joules successul in restoring NSR     Hyperlipidemia LDL goal <70 5/26/2011     Hypertension      Hypothyroidism      Impotence of organic origin      Laceration of finger  June 2010     left thumb s/p sutures     Numbness and tingling     diabetic neuropathy bilateral feet     BRANDON (obstructive sleep apnea)     intolerant of CPAP, uses it occasionally.  Using mandibular device occasionally     Osteopenia      Pulmonary hypertension (H) 4/6/2012     Sensorineural hearing loss, unspecified      Stented coronary artery 1997    CABG      Testosterone deficiency      Type 2 diabetes mellitus without complication  (goal A1C<8) 10/24/2015     Typical atrial flutter (H) 4/18/16     Unspecified hypothyroidism      Vitamin D deficiency 9/3/2011        Surgical History:      Past Surgical History:   Procedure Laterality Date     ANESTHESIA CARDIOVERSION N/A 2/13/2019    Procedure: ANESTHESIA CARDIOVERSION;  Surgeon: GENERIC ANESTHESIA PROVIDER;  Location:  OR     ANESTHESIA CARDIOVERSION N/A 5/22/2019    Procedure: ANESTHESIA, FOR CARDIOVERSION;  Surgeon: GENERIC ANESTHESIA PROVIDER;  Location:  OR     C NONSPECIFIC PROCEDURE  1995    CABG ([facility])     C NONSPECIFIC PROCEDURE  8/01    stress echo     CARDIAC SURGERY      bypass in 1997     CARDIOVERSION  04/24/2018     COLONOSCOPY       CORONARY ANGIOGRAPHY ADULT ORDER      4/2/2012     CORONARY ARTERY BYPASS  1997    [facility]     ENDOSCOPIC ULTRASOUND UPPER GASTROINTESTINAL TRACT (GI) N/A 3/14/2019    Procedure: ENDOSCOPIC ULTRASOUND OF UPPER GASTROINTESTINAL TRACT;  Surgeon: Ju Torres MD;  Location:  OR     EXCISE MASS HEAD Left 8/18/2016    Procedure: EXCISE MASS HEAD;  Surgeon: Ivan Hernandez MD;  Location:  [facility]     HEART CATH, ANGIOPLASTY  4/2012     LAPAROSCOPIC  CHOLECYSTECTOMY N/A 3/17/2019    Procedure: LAPAROSCOPIC CHOLECYSTECTOMY;  Surgeon: Janel Paz MD;  Location:  OR     PHACOEMULSIFICATION CLEAR CORNEA WITH STANDARD INTRAOCULAR LENS IMPLANT Left 2015    Procedure: PHACOEMULSIFICATION CLEAR CORNEA WITH STANDARD INTRAOCULAR LENS IMPLANT;  Surgeon: Nixon Farnsworth MD;  Location:  [facility]     PHACOEMULSIFICATION CLEAR CORNEA WITH STANDARD INTRAOCULAR LENS IMPLANT Right 2015    Procedure: PHACOEMULSIFICATION CLEAR CORNEA WITH STANDARD INTRAOCULAR LENS IMPLANT;  Surgeon: Nixon Farnsworth MD;  Location:  [facility]     SEPTOPLASTY, TURBINOPLASTY, COMBINED Bilateral 2016    Procedure: COMBINED SEPTOPLASTY, TURBINOPLASTY;  Surgeon: Ivan Hernandez MD;  Location: [facility]             Social History:      Social History     Tobacco Use     Smoking status: Former Smoker     Packs/day: 1.00     Years: 6.00     Pack years: 6.00     Start date:      Last attempt to quit: 1964     Years since quittin.5     Smokeless tobacco: Never Used   Substance Use Topics     Alcohol use: Yes     Comment: couple drinks a year             Family History:     Family History   Problem Relation Age of Onset     Genitourinary Problems Father         d: age 89; ARF     Hypertension Father      Diabetes Mother         d: age 68, CAD     Heart Disease Mother         MI     Myocardial Infarction Mother      Cardiovascular Brother         d:  age 31; CAD     Heart Disease Brother         age 31, MI     Diabetes Sister         b:  1939; DM2             Allergies:     Allergies   Allergen Reactions     Omeprazole      diarrhea             Medications:   Home meds reviewed          Physical Exam      Admission Weight: 84.4 kg (186 lb)    Vital Signs with Ranges  Temp:  [98.7  F (37.1  C)] 98.7  F (37.1  C)  Pulse:  [80-95] 80  Heart Rate:  [80-92] 80  Resp:  [11-22] 15  BP: ()/(51-65) 110/58  SpO2:  [96 %-99 %] 97 %    PHYSICAL EXAM  GENERAL: Patient is in no  distress. Alert and oriented.  HEENT: Oropharynx pink, moist. Pupils equal  HEART: Regular rate and rhythm. S1S2. No murmurs  LUNGS: Clear to auscultation bilaterally. No expiratory wheeze.  Respirations unlabored  ABDOMEN: Soft, no abdominal tenderness, bowel sounds heard   NEURO moving all extremities, no focal weakness.  EXTREMITIES: No pedal edema. 2+ peripheral pulses.  SKIN: Warm, dry. No rash or bruising.  PSYCHIATRY Cooperative         Data:   All new lab and imaging data was reviewed.

## 2020-01-08 NOTE — ED PROVIDER NOTES
"  History     Chief Complaint:  Shortness of Breath    The history is provided by the patient.      Pascual Perea is a 79 year old male, with complicated past medical history as noted below with history of pulmonary hypertension, stented coronary artery, type II diabetes, hyperlipidemia, hypertension, atrial fibrillation, CKD, coronary atherosclerosis amongst others as noted below, currently anticoagulated on Eliquis, who presents with family for evaluation of chest ache, fatigue and shortness of breath that began 5 days ago. Patient reports that he was getting ready for bed and had taken 4 steps when he felt \"pressure\" in his head and felt \"out of it\", nothing the pain radiated into his chest then \"down my legs and out my body.\" He notes that after this episode, he continued to have aches and pains all over and states that he began to feel short of breath today. Due to symptoms, he was prompted to present.     Here, patient states that he continues to experience the chest ache, noting it is mostly in his mid chest. Patient states that he has been cardioverted 4 times in the past and was mainly concerned for AFIB recurrence. He notes he has been compliant with his medications and denies any fevers, nausea, vomiting or new abdominal pain. Patient states that he has no history of MI, but had a bypass in 1997.     ECHO from 4/25/19:  Interpretation Summary     1. The left ventricle is normal in structure, function and size. The visual  ejection fraction is estimated at 55%.  2. Borderline right ventricular enlargement. Mildly decreased right  ventricular systolic function  3. Mild valvular aortic stenosis. Mean 9mmHg, Vmax 2.4m/s, AHSAN 1.7cm2, DI  0.44.  4. Dilation of the inferior vena cava is present with normal respiratory  variation in diameter.    Allergies:  Omeprazole     Medications:    Eliquis  Pepcid  Allegra  Lasix  Glucose  Novolog  Levemir flexpen  Lisinopril  Nitrostat  Pravachol  Levothyroxine "     Past Medical History:    Adhesive capulitis of shoulder  Anemia  Arrythmia  CAD  Hyperlipidemia  Hypertension  Hypothyroidism  BRANDON  Osteopenia   Pulmonary hypertension  Sensorineural hearing loss  Stented coronary artery  Testosterone deficiency  Type II diabetes  Typical atrial flutter  Atrial fibrillation  CKD, stage III  GERD  Coronary atherosclerosis    Past Surgical History:    Anesthesia cardioversion x3  CABG  Stress echo  Bypass   Colonoscopy  Coronary angiography adult order  Endoscopic ultrasound upper GI  Excise mass head  Heart cath, angioplasty  Laparoscopic cholecystectomy  Phacoemulsification clear cornea with standard intraocular lens implant x2  Septoplasty, turbinoplasty, combined    Family History:    Father - genitourinary problems, ARF, hypertension  Mother - diabetes, CAD, heart disease, MI  Brother(s) - cardiovascular, CAD, heart disease, MI  Sister - diabetes    Social History:  The patient was accompanied to the ED by familt.  Smoking Status: Former  Smokeless Tobacco: No  Alcohol Use: Yes  Drug Use: No   Marital Status:   [2]     Review of Systems   Constitutional: Positive for fatigue. Negative for fever.   Respiratory: Positive for shortness of breath.    Cardiovascular: Positive for chest pain.   Gastrointestinal: Negative for abdominal pain, nausea and vomiting.   All other systems reviewed and are negative.    Physical Exam     Patient Vitals for the past 24 hrs:   BP Temp Pulse Heart Rate Resp SpO2 Height Weight   01/08/20 1730 113/63 -- 74 75 18 97 % -- --   01/08/20 1715 105/59 -- 72 70 18 92 % -- --   01/08/20 1700 110/63 -- 74 75 12 98 % -- --   01/08/20 1645 110/65 -- 79 75 19 96 % -- --   01/08/20 1630 106/59 -- 76 81 20 95 % -- --   01/08/20 1615 112/59 -- 79 81 16 98 % -- --   01/08/20 1605 110/58 -- 80 80 15 97 % -- --   01/08/20 1600 105/64 -- -- -- -- -- -- --   01/08/20 1545 103/55 -- 82 85 22 99 % -- --   01/08/20 1540 123/64 -- 89 85 15 97 % -- --   01/08/20  "1535 109/51 -- 86 87 12 98 % -- --   01/08/20 1530 113/55 -- 89 85 11 96 % -- --   01/08/20 1526 97/59 -- 90 92 20 98 % -- --   01/08/20 1521 119/65 -- 92 92 20 98 % -- --   01/08/20 1507 (!) 149/58 98.7  F (37.1  C) 95 -- 18 99 % 1.803 m (5' 11\") 84.4 kg (186 lb)       Physical Exam  General: Alert, appears elderly, otherwise well-developed and well-nourished. Cooperative.     In no acute distress  HEENT:  Head:  Atraumatic  Ears:  External ears are normal  Mouth/Throat:  Oropharynx is without erythema or exudate and mucous membranes are moist.   Eyes:   Conjunctivae normal and EOM are normal. No scleral icterus.  CV:  Normal rate, regular rhythm, normal heart sounds and radial pulses are 2+ and symmetric.  Systolic murmur.  Resp:  Breath sounds are clear bilaterally    Non-labored, no retractions or accessory muscle use  GI:  Abdomen is soft, no distension, no tenderness. No rebound or guarding.  No CVA tenderness bilaterally  MS:  Normal range of motion. No edema.    Normal strength in all 4 extremities.     Back atraumatic.    No midline cervical, thoracic, or lumbar tenderness  Skin:  Warm and dry.  Well healed midline sternotomy scar.  No rash or lesions noted.  Neuro: Alert. Normal strength.  GCS: 15  Psych:  Normal mood and affect.    Emergency Department Course     ECG:  ECG taken at 1508, ECG read at 1517 by Dr. Jania MD  Sinus rhythm with 1st degree AV block  Inferior infarct, age undetermined  ST & T wave abnormality, consider lateral ischemia  Abnormal ECG  Slightly more prominent ST depression in inferior/lateral leads compared to EKG dated 11/19/19  Rate 92 bpm. NM interval 252. QRS duration 122. QT/QTc 386/477. P-R-T axes 88 88 -82.      Imaging:  Radiology findings were communicated with the patient and family who voiced understanding of the findings.    XR Chest:  IMPRESSION: PA and lateral views of the chest. Lungs are clear. Heart  is normal in size. No effusions are evident. No pneumothorax. " Patient  is status post CABG.  Reading per radiology.     Laboratory:  Laboratory findings were communicated with the patient and family who voiced understanding of the findings.    CBC: HGB 13.0 (L) o/w WNL (WBC 4.6, )  BMP: Glucose 312 (H), Creatinine 1.29 (H), GFR Estimate 53 (L) o/w WNL  Troponin (Collected 1517): <0.015  TSH with free T4 reflex: 0.44   BNP: 128     Interventions:  1529 Aspirin 325 mg PO  1533 0.9% NaCl Bolus 500 mL IV    Emergency Department Course:  Past medical records, nursing notes, and vitals reviewed.    (1517)   I performed an exam of the patient as documented above. History obtained from patient.     IV was inserted and blood was drawn for laboratory testing, results above.    The patient was sent for a XR Chest while in the emergency department, results above.      (1609)   I rechecked the patient and discussed the results of his workup thus far. Discussed plan of care and patient will be admitted.     (1636)   I spoke with Dr. Cruz of the Hospitalist service regarding patient's presentation, findings, and plan of care, who agrees to accept patient for further care, monitoring and evaluation.      Findings and plan explained to the Patient and family who consents to admission. Discussed the patient with Dr. Cruz, who will admit the patient to an observation bed for further monitoring, evaluation, and treatment.    I personally reviewed the laboratory and imaging results with the Patient and family and answered all related questions prior to admission.       HEART Score  Background  Calculates the overall risk of adverse event in patient's presenting with chest pain.  Based on 5 criteria (each assigned 0-2 points) including suspiciousness of history, EKG, age, risk factors and troponin.    Data  79 year old male  has Essential hypertension, benign; Coronary atherosclerosis; Impotence of organic origin; Allergic rhinitis; Testosterone deficiency; Hyperlipidemia LDL goal  <70; Osteopenia; Advanced directives, counseling/discussion; Vitamin D deficiency; Health Care Home; Pulmonary hypertension (H); BRANDON (obstructive sleep apnea); Atrial flutter (H); Gastroesophageal reflux disease without esophagitis; Hypothyroidism, unspecified type; Type 2 diabetes mellitus without complication, with long-term current use of insulin (H); Atrial fibrillation, unspecified type (H); and CKD (chronic kidney disease) stage 3, GFR 30-59 ml/min (H) on their problem list.   reports that he quit smoking about 55 years ago. He started smoking about 62 years ago. He has a 6.00 pack-year smoking history. He has never used smokeless tobacco.  family history includes Cardiovascular in his brother; Diabetes in his mother and sister; Genitourinary Problems in his father; Heart Disease in his brother and mother; Hypertension in his father; Myocardial Infarction in his mother.  Lab Results   Component Value Date    TROPI 0.052 04/17/2018     Criteria   0-2 points for each of 5 items (maximum of 10 points):  Score 1- History moderately suspicious for coronary syndrome  Score 2- EKG with Significant ST Depression  Score 2- Age 65 years or older  Score 2- Three or more risk factors for or history of atherosclerotic disease  Score 0- Within normal limits for troponin levels  Interpretation  Risk of adverse outcome  Heart Score: 7  Total Score 7-10- Adverse Outcome Risk 72.7% - Supports early aggressive management, typically including cardiac catheterization    Impression & Plan     Medical Decision Making:  Patient is a 79 year old male with a complex past medical history pertinent for CAD with status post CABG, atrial fibrillation on chronic anticoagulation, CKD, and hypertension who presents with increasing fatigue and shortness of breath in the last 5 days. Patient describes a chest ache with radiation into the back and arms, but no active pain. His EKG does show some ST depression in the inferior and lateral leads,  but reassuringly his troponin initially is negative here. The remainder of the blood work is unremarkable. Chest x-ray shows no evidence of focal pneumonia or pneumothorax. Due to patient's risk factors and history of CAD, would prefer to admit the patient to observation for serial troponin studies and potential echocardiogram to assess heart function. Lower concern this presentation is suspicious for unstable angina without pain, would not heparinize at this time.  Spoke to Dr. Cruz who agreed to observation.      Diagnosis:    ICD-10-CM    1. Chest pain, unspecified type R07.9    2. Hx of CABG Z95.1        Disposition:  Admitted to Observation.    Scribe Disclosure:  I, Ann-Marie Spears, am serving as a scribe at 3:19 PM on 1/8/2020 to document services personally performed by Randolph Dykes MD based on my observations and the provider's statements to me.   1/8/2020    EMERGENCY DEPARTMENT       Randolph Dykes MD  01/08/20 [id number]

## 2020-01-09 ENCOUNTER — APPOINTMENT (OUTPATIENT)
Dept: CARDIOLOGY | Facility: CLINIC | Age: 80
End: 2020-01-09
Attending: HOSPITALIST
Payer: COMMERCIAL

## 2020-01-09 ENCOUNTER — HOSPITAL ENCOUNTER (OUTPATIENT)
Dept: CARDIOLOGY | Facility: CLINIC | Age: 80
Setting detail: OBSERVATION
End: 2020-01-09
Attending: PHYSICIAN ASSISTANT
Payer: COMMERCIAL

## 2020-01-09 VITALS
HEIGHT: 71 IN | RESPIRATION RATE: 16 BRPM | DIASTOLIC BLOOD PRESSURE: 56 MMHG | BODY MASS INDEX: 27.19 KG/M2 | OXYGEN SATURATION: 99 % | HEART RATE: 76 BPM | TEMPERATURE: 97.8 F | WEIGHT: 194.22 LBS | SYSTOLIC BLOOD PRESSURE: 103 MMHG

## 2020-01-09 DIAGNOSIS — I48.91 ATRIAL FIBRILLATION, UNSPECIFIED TYPE (H): ICD-10-CM

## 2020-01-09 DIAGNOSIS — R07.9 CHEST PAIN, UNSPECIFIED TYPE: ICD-10-CM

## 2020-01-09 LAB
ANION GAP SERPL CALCULATED.3IONS-SCNC: 1 MMOL/L (ref 3–14)
BUN SERPL-MCNC: 17 MG/DL (ref 7–30)
CALCIUM SERPL-MCNC: 9.6 MG/DL (ref 8.5–10.1)
CHLORIDE SERPL-SCNC: 109 MMOL/L (ref 94–109)
CO2 SERPL-SCNC: 31 MMOL/L (ref 20–32)
CREAT SERPL-MCNC: 1.09 MG/DL (ref 0.66–1.25)
GFR SERPL CREATININE-BSD FRML MDRD: 64 ML/MIN/{1.73_M2}
GLUCOSE BLDC GLUCOMTR-MCNC: 167 MG/DL (ref 70–99)
GLUCOSE BLDC GLUCOMTR-MCNC: 183 MG/DL (ref 70–99)
GLUCOSE BLDC GLUCOMTR-MCNC: 189 MG/DL (ref 70–99)
GLUCOSE SERPL-MCNC: 208 MG/DL (ref 70–99)
MAGNESIUM SERPL-MCNC: 1.7 MG/DL (ref 1.6–2.3)
POTASSIUM SERPL-SCNC: 4.2 MMOL/L (ref 3.4–5.3)
SODIUM SERPL-SCNC: 141 MMOL/L (ref 133–144)
TROPONIN I SERPL-MCNC: <0.015 UG/L (ref 0–0.04)
TROPONIN I SERPL-MCNC: <0.015 UG/L (ref 0–0.04)

## 2020-01-09 PROCEDURE — G0378 HOSPITAL OBSERVATION PER HR: HCPCS

## 2020-01-09 PROCEDURE — 80048 BASIC METABOLIC PNL TOTAL CA: CPT | Performed by: HOSPITALIST

## 2020-01-09 PROCEDURE — 93325 DOPPLER ECHO COLOR FLOW MAPG: CPT | Mod: TC

## 2020-01-09 PROCEDURE — 93350 STRESS TTE ONLY: CPT | Mod: 26 | Performed by: INTERNAL MEDICINE

## 2020-01-09 PROCEDURE — 36415 COLL VENOUS BLD VENIPUNCTURE: CPT | Performed by: HOSPITALIST

## 2020-01-09 PROCEDURE — 99217 ZZC OBSERVATION CARE DISCHARGE: CPT | Performed by: PHYSICIAN ASSISTANT

## 2020-01-09 PROCEDURE — 0296T ZIO PATCH HOLTER ADULT PEDIATRIC GREATER THAN 48 HRS: CPT

## 2020-01-09 PROCEDURE — 93325 DOPPLER ECHO COLOR FLOW MAPG: CPT | Mod: 26 | Performed by: INTERNAL MEDICINE

## 2020-01-09 PROCEDURE — 93321 DOPPLER ECHO F-UP/LMTD STD: CPT | Mod: 26 | Performed by: INTERNAL MEDICINE

## 2020-01-09 PROCEDURE — 93016 CV STRESS TEST SUPVJ ONLY: CPT | Performed by: INTERNAL MEDICINE

## 2020-01-09 PROCEDURE — 25000132 ZZH RX MED GY IP 250 OP 250 PS 637: Performed by: HOSPITALIST

## 2020-01-09 PROCEDURE — 00000146 ZZHCL STATISTIC GLUCOSE BY METER IP

## 2020-01-09 PROCEDURE — 0298T ZIO PATCH HOLTER ADULT PEDIATRIC GREATER THAN 48 HRS: CPT | Performed by: INTERNAL MEDICINE

## 2020-01-09 PROCEDURE — 83735 ASSAY OF MAGNESIUM: CPT | Performed by: HOSPITALIST

## 2020-01-09 PROCEDURE — 25000131 ZZH RX MED GY IP 250 OP 636 PS 637: Performed by: HOSPITALIST

## 2020-01-09 PROCEDURE — 96372 THER/PROPH/DIAG INJ SC/IM: CPT | Mod: 59

## 2020-01-09 PROCEDURE — 84484 ASSAY OF TROPONIN QUANT: CPT | Performed by: HOSPITALIST

## 2020-01-09 PROCEDURE — 93018 CV STRESS TEST I&R ONLY: CPT | Performed by: INTERNAL MEDICINE

## 2020-01-09 RX ORDER — FAMOTIDINE 20 MG/1
40 TABLET, FILM COATED ORAL 2 TIMES DAILY
Qty: 180 TABLET | Refills: 3 | Status: SHIPPED | OUTPATIENT
Start: 2020-01-09 | End: 2020-09-22

## 2020-01-09 RX ADMIN — INSULIN ASPART 5 UNITS: 100 INJECTION, SOLUTION INTRAVENOUS; SUBCUTANEOUS at 13:33

## 2020-01-09 RX ADMIN — INSULIN GLARGINE 10 UNITS: 100 INJECTION, SOLUTION SUBCUTANEOUS at 10:19

## 2020-01-09 RX ADMIN — LISINOPRIL 30 MG: 20 TABLET ORAL at 08:32

## 2020-01-09 RX ADMIN — APIXABAN 5 MG: 5 TABLET, FILM COATED ORAL at 08:33

## 2020-01-09 RX ADMIN — FAMOTIDINE 20 MG: 20 TABLET, FILM COATED ORAL at 06:48

## 2020-01-09 NOTE — PLAN OF CARE
PRIMARY DIAGNOSIS: CHEST PAIN  OUTPATIENT/OBSERVATION GOALS TO BE MET BEFORE DISCHARGE:  1. Ruled out acute coronary syndrome (negative or stable Troponin):  No  2. Pain Status: Improved with use of alternative comfort measures i.e.: distraction using emotional support  3. Appropriate provocative testing performed: No  - Stress Test Procedure: Regular  - Interpretation of cardiac rhythm per telemetry tech: SR w/ 1st deg AV block    4. Cleared by Consultants (if applicable):No  5. Return to near baseline physical activity: Yes  Discharge Planner Nurse   Safe discharge environment identified: Yes  Barriers to discharge: Yes       Entered by: Daija Sim 01/08/2020 7:06 PM     Please review provider order for any additional goals.   Nurse to notify provider when observation goals have been met and patient is ready for discharge.    A&Ox4. VSS on RA. Tele SR w/ 1st deg AV block. Cardiac diet, no caffeine. 1/10 dull chest discomfort, per pt improved, SOB improved. Trop negative x1. Denies nausea, dizziness/lightheadedness. Plan for stress echo, SW/CC consults in am. .

## 2020-01-09 NOTE — PROGRESS NOTES
"A&Ox4. Up SBA. Clear liquid, no caffeine diet for Stress test. VSS on RA. C/o 1/10 constant dull chest pain previously but denied this shift. 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 Tele SR 1st degree AVB. Trop (-) x3. Plan for stress echo today. BG at 2 am was 167.    PRIMARY DIAGNOSIS: CHEST PAIN  OUTPATIENT/OBSERVATION GOALS TO BE MET BEFORE DISCHARGE:  1. Ruled out acute coronary syndrome (negative or stable Troponin):  No  2. Pain Status: c/o 1/10 chest pain radiating to bilat arms, shoulder, back  3. Appropriate provocative testing performed: No  - Stress Test Procedure: Echo stress  - Interpretation of cardiac rhythm per telemetry tech: SR w/ 1st deg AV block     4. Cleared by Consultants (if applicable):No  5. Return to near baseline physical activity: Yes          "

## 2020-01-09 NOTE — PROGRESS NOTES
PRIMARY DIAGNOSIS: CHEST PAIN  OUTPATIENT/OBSERVATION GOALS TO BE MET BEFORE DISCHARGE:  1. Ruled out acute coronary syndrome (negative or stable Troponin):  yes  2. Pain Status: c/o 1/10 chest pain radiating to bilat arms, shoulder, back  3. Appropriate provocative testing performed: done  - Stress Test Procedure: Echo stress  - Interpretation of cardiac rhythm per telemetry tech: SR w/ 1st deg AV block     4. Cleared by Consultants (if applicable):Yes  5. Return to near baseline physical activity: Yes

## 2020-01-09 NOTE — PLAN OF CARE
A&), VSS on RA, LS clear, on RA, BS audible and active, continent of both B and B, on Mod carb diet, tolerating it well, CMS intact, up independent, denies pain. Discharge instruction done, patient acknowledged understanding.

## 2020-01-09 NOTE — PROGRESS NOTES
PRIMARY DIAGNOSIS: CHEST PAIN  OUTPATIENT/OBSERVATION GOALS TO BE MET BEFORE DISCHARGE:  1. Ruled out acute coronary syndrome (negative or stable Troponin):  No  2. Pain Status: c/o 1/10 chest pain radiating to bilat arms, shoulder, back  3. Appropriate provocative testing performed: No  - Stress Test Procedure: Echo stress  - Interpretation of cardiac rhythm per telemetry tech: SR w/ 1st deg AV block     4. Cleared by Consultants (if applicable):No  5. Return to near baseline physical activity: Yes

## 2020-01-09 NOTE — DISCHARGE SUMMARY
[facility]    Discharge Summary  Hospitalist    Date of Admission:  1/8/2020  Date of Discharge:  1/9/2020  Discharging Provider: Robby Doyle PA-C    Discharge Diagnoses      Chest pain, unspecified type  Hx of CABG  Gastroesophageal reflux disease without esophagitis  Type 2 diabetes mellitus without complication, with long-term current use of insulin (H)  Atrial fibrillation, unspecified type (H)    History of Present Illness   Pascual Perea is an 79 year old male who presented with shortness of breath and fatigue.    HPI from admission H&P:  History is obtained from patient, his son, daughter by the bedside, ED team and review of medical records.     Pascual Perea is a 79 year old  male with medical history of coronary artery disease status post CABG, atrial fibrillation on anticoagulation, hypertension, hyperlipidemia, uncontrolled diabetes mellitus presented to the ED with shortness of breath and fatigue.     Per patient report symptoms started on 1/3/2019 when he was getting ready for bed, had taken 4 steps felt pressure in his head and felt out of it.  Subsequently had pain radiating down his chest and down his legs out of his body.  Reports after that episode continue to have pressure in his head, chest pain, shoulder pain, arm pain, back pain ongoing for 5 days now.  Chest pain mid chest 2/10 in intensity.  No fever or chills.  Had noted increased fatigue, shortness of breath today.  Able to carry on activities of daily living, lives independently.  Uses 1 pillow to sleep at night, no new cough, no wheezing.  No leg swelling.  No recent travel, no exposure to sick contacts.  No abdominal pain, no diarrhea or constipation.  No new tingling numbness or weakness in hands and legs.  No new joint pains.  No focal weakness.     In ED afebrile, heart rate in 70s to 80s.  Physical exam nonrevealing.  WBC 4.6, troponin less than 0.015, TSH 0.44, .  EKG sinus rhythm with  first-degree AV block.  ST-T wave changes in inferior, lateral leads slightly more prominent when compared to previous EKG..  Chest x-ray no acute pathology.    Hospital Course   Pascual Perea was admitted on 1/8/2020.  The following problems were addressed during his hospitalization:    Atypical chest pain, SOB rule out acute coronary syndrome  Coronary artery disease status post CABG over 20 years ago  Hypertension  Per patient report symptoms started on 1/3/2019 when he was getting ready for bed, had taken 4 steps felt pressure in his head and felt out of it.  Subsequently had pain radiating down his chest and down his legs out of his body.  Reports after that episode continue to have pressure in his head, chest pain, shoulder pain, arm pain, back pain ongoing for 5 days now.  Had noted increased fatigue, shortness of breath today.  Able to carry on activities of daily living, lives independently. Workup in ED unrevealing, troponin negative. He was admitted under observation care, stress echo 1/9 negative.  - Zio patch placed, ?intermittent episodes of RVR given sx lightheadedness, head pressure  - Cardiology follow-up requested  - Continued on PTA medications without changes     Atrial fibrillation on Eliquis  Telemetry monitoring.  TSH 0.44. Continue PTA Eliquis.      Acute kidney injury likely prerenal  CKD stage II  Baseline creatinine around 1-1.1.  Presented with creatinine of 1.2.     Hyperglycemia from uncontrolled diabetes.  Uncontrolled diabetes mellitus with a hemoglobin A1c of 9.4.  Presented with blood sugars of 312.  - Resumed PTA prandial insulin  - Started Lantus 10u BID    Physical deconditioning likely from senile fragility, ongoing illness.  Lives independently, carries on all activities of daily living independently.     Hypothyroidism.  Hold PTA levothyroxine while under observation status.     Hyperlipidemia.  Hold PTA pravastatin while under observation status, check liver function test  given recent deranged LFTs.    Robby Doyle PA-C    Significant Results and Procedures   As noted    Pending Results   These results will be followed up by n/a  Unresulted Labs Ordered in the Past 30 Days of this Admission     No orders found for last 31 day(s).          Code Status   Full Code       Primary Care Physician   Andrew Elizalde    Physical Exam   Temp: 97.8  F (36.6  C) Temp src: Oral BP: 103/56 Pulse: 76 Heart Rate: 86 Resp: 16 SpO2: 99 % O2 Device: None (Room air)    Vitals:    01/08/20 1507 01/08/20 1823   Weight: 84.4 kg (186 lb) 88.1 kg (194 lb 3.6 oz)     Vital Signs with Ranges  Temp:  [96.1  F (35.6  C)-98.7  F (37.1  C)] 97.8  F (36.6  C)  Pulse:  [72-95] 76  Heart Rate:  [70-92] 86  Resp:  [11-22] 16  BP: ()/(51-65) 103/56  SpO2:  [92 %-99 %] 99 %  I/O last 3 completed shifts:  In: -   Out: 150 [Urine:150]    GEN: well-developed, well-nourished, appears comfortable  PULM: lungs CTA bilaterally, no increased work of breathing, no wheeze, rales, rhonchi  CV: irregularly irregular, S1 & S2  GI: soft, nontender, nondistended, no guarding or rigidity, +BS in all 4 quadrants  SKIN: warm & dry without rash, wound, or pedal edema    Discharge Disposition   Discharged to home  Condition at discharge: Stable    Consultations This Hospital Stay   SOCIAL WORK IP CONSULT  CARE COORDINATOR IP CONSULT    Time Spent on this Encounter   I, Robby Doyle PA-C, personally saw the patient today and spent less than or equal to 30 minutes discharging this patient.    Discharge Orders      Reason for your hospital stay    Chest pain, atypical. Your stress test is negative. Given symptoms last week, will place a cardiac monitor to watch heart rates as this can cause discomfort. You are also encouraged to trial an increased dose of your antacid medication for 2 weeks.     Follow-up and recommended labs and tests     Follow up with primary care provider, Andrew Elizalde, within 1 month, to evaluate medication change  and for hospital follow- up. The following labs/tests are recommended: BG monitoring, hospital follow-up.     Activity    Your activity upon discharge: activity as tolerated     Zio Patch Holter Adult Pediatric Greater than 48 hrs     Diet    Follow this diet upon discharge: Orders Placed This Encounter      Combination Diet Clear Liquid; No Caffeine Diet     Discharge Medications   Current Discharge Medication List      START taking these medications    Details   insulin glargine (LANTUS PEN) 100 UNIT/ML pen Inject 10 Units Subcutaneous 2 times daily  Qty: 3 mL, Refills: 0    Comments: If Lantus is not covered by insurance, may substitute Basaglar at same dose and frequency.    Associated Diagnoses: Type 2 diabetes mellitus without complication, with long-term current use of insulin (H)         CONTINUE these medications which have CHANGED    Details   famotidine (PEPCID) 20 MG tablet Take 2 tablets (40 mg) by mouth 2 times daily  Qty: 180 tablet, Refills: 3    Associated Diagnoses: Gastroesophageal reflux disease without esophagitis         CONTINUE these medications which have NOT CHANGED    Details   apixaban ANTICOAGULANT (ELIQUIS) 5 MG tablet Take 1 tablet (5 mg) by mouth 2 times daily  Qty: 180 tablet, Refills: 2    Associated Diagnoses: Typical atrial flutter (H)      azelastine (ASTELIN) 0.1 % nasal spray Spray 2 sprays into both nostrils 2 times daily  Qty: 1 Bottle, Refills: 11    Associated Diagnoses: Chronic rhinitis      Cholecalciferol (VITAMIN D3 PO) Take 5,000 Units by mouth daily      fexofenadine (ALLEGRA) 180 MG tablet Take 1 tablet (180 mg) by mouth daily  Qty: 90 tablet, Refills: 3    Comments: Disp generic  Associated Diagnoses: Allergic rhinitis, unspecified seasonality, unspecified trigger      furosemide (LASIX) 20 MG tablet TAKE ONE TABLET BY MOUTH ONCE DAILY  Qty: 30 tablet, Refills: 9    Associated Diagnoses: Edema, unspecified type      glucose 4 g CHEW Take 1 tablet by mouth every hour  as needed for low blood sugar      insulin aspart (NOVOLOG FLEXPEN) 100 UNIT/ML pen Inject 3 times daily (before meals). 2 units per carb choice (15gm). Approx 5-10 units/meal.  Qty: 30 mL, Refills: 3    Associated Diagnoses: Type 2 diabetes mellitus without complication, with long-term current use of insulin (H)      insulin detemir (LEVEMIR FLEXPEN/FLEXTOUCH) 100 UNIT/ML pen Inject 30-35 Units Subcutaneous At Bedtime  Qty: 45 mL, Refills: 3    Associated Diagnoses: Type 2 diabetes mellitus without complication, with long-term current use of insulin (H)      levothyroxine (SYNTHROID/LEVOTHROID) 125 MCG tablet Take 1 tablet (125 mcg) by mouth daily  Qty: 90 tablet, Refills: 1    Associated Diagnoses: Hypothyroidism, unspecified type      lisinopril (PRINIVIL/ZESTRIL) 30 MG tablet Take 1 tablet (30 mg) by mouth daily  Qty: 90 tablet, Refills: 3    Associated Diagnoses: Essential hypertension, benign      nitroglycerin (NITROSTAT) 0.4 MG SL tablet Place 1 tablet (0.4 mg) under the tongue every 5 minutes as needed for chest pain  Qty: 25 tablet, Refills: 1    Associated Diagnoses: Unstable angina (H)      pravastatin (PRAVACHOL) 80 MG tablet Take 1 tablet (80 mg) by mouth At Bedtime  Qty: 90 tablet, Refills: 2    Associated Diagnoses: Hyperlipidemia LDL goal <70      NOVOFINE 30 30G X 8 MM insulin pen needle USE FOUR TIMES DAILY. PATIENT NEEDS OFFICE VISIT  Qty: 400 each, Refills: 1    Associated Diagnoses: Type 2 diabetes mellitus without complication, with long-term current use of insulin (H)           Allergies   Allergies   Allergen Reactions     Omeprazole      diarrhea     Data   Most Recent 3 CBC's:  Recent Labs   Lab Test 01/08/20  1514 09/30/19  0839 04/19/19  0848  03/22/19  1453 03/18/19  0941   WBC 4.6  --   --   --  6.5 7.1   HGB 13.0* 14.9 15.5   < > 14.5 15.3   MCV 87  --   --   --  87 87     --   --   --  193 172    < > = values in this interval not displayed.      Most Recent 3 BMP's:  Recent  Labs   Lab Test 01/09/20  0627 01/08/20  1514 12/30/19  1259    140 134   POTASSIUM 4.2 4.3 4.8   CHLORIDE 109 107 103   CO2 31 29 29   BUN 17 23 23   CR 1.09 1.29* 1.17   ANIONGAP 1* 4 3   STARR 9.6 10.2* 10.1   * 312* 393*     Most Recent 2 LFT's:  Recent Labs   Lab Test 01/08/20  1514 12/30/19  1259   AST 42 45   ALT 74* 86*   ALKPHOS 132 155*   BILITOTAL 0.9 1.2     Most Recent INR's and Anticoagulation Dosing History:  Anticoagulation Dose History     Recent Dosing and Labs Latest Ref Rng & Units 5/12/2005 3/30/2012 3/14/2019 3/17/2019 5/22/2019    INR 0.86 - 1.14 0.90 0.94 1.37(H) 1.09 1.43(H)        Most Recent 3 Troponin's:  Recent Labs   Lab Test 01/09/20  0627 01/09/20  0002 01/08/20  1954   TROPI <0.015 <0.015 <0.015     Most Recent Cholesterol Panel:  Recent Labs   Lab Test 09/30/19  0839   CHOL 152   LDL 45   HDL 35*   TRIG 360*     Most Recent 6 Bacteria Isolates From Any Culture (See EPIC Reports for Culture Details):  Recent Labs   Lab Test 03/14/19  1805 03/14/19  1756 03/13/19  2210 03/13/19  2150 03/13/19  2035 08/18/16  0800   CULT No growth No growth 10,000 to 50,000 colonies/mL  Escherichia coli  * Cultured on the 1st day of incubation:  Escherichia coli  Susceptibility testing done on previous specimen  *  Critical Value/Significant Value, preliminary result only, called to and read back by  Yoko Devine RN at [place] [id number] on 3.14.19 at 1106 by SS.   Cultured on the 1st day of incubation:  Escherichia coli  *  Critical Value/Significant Value, preliminary result only, called to and read back by  Yoko Devine RN at [place] [id number] on 3.14.19 at 1025 by SS.    (Note)  POSITIVE for E.COLI by Verigene multiplex nucleic acid test. Final  identification and antimicrobial susceptibility testing will be  verified by standard methods. Verigene test will not distinguish  E.coli from Shigella species including S.dysenteriae, S.flexneri,  S.boydii, and S.sonnei. Specimens containing  Shigella species or  E.coli will be reported as Positive for E.coli.    Specimen tested with Verigene multiplex, gram-negative blood culture  nucleic acid test for the following targets: Acinetobacter sp.,  Citrobacter sp., Enterobacter sp., Proteus sp., E. coli, K.  pneumoniae/oxytoca, P. aeruginosa, and the following resistance  markers: CTXM, KPC, NDM, VIM, IMP and OXA.    Critical Value/Significant Value called to and read back by Yoko Devine RN SH55 @ 1302.cg 19     Moderate growth Normal respiratory tabitha     Most Recent TSH, T4 and A1c Labs:  Recent Labs   Lab Test 20  1514  19  1630   TSH 0.44   < > 5.52*   T4  --   --  1.08   A1C 8.5*   < >  --     < > = values in this interval not displayed.     Results for orders placed or performed during the hospital encounter of 20   XR Chest 2 Views    Narrative    CHEST TWO VIEWS    2020 3:52 PM     HISTORY: Shortness of breath    COMPARISON: Chest x-ray 2019.      Impression    IMPRESSION: PA and lateral views of the chest. Lungs are clear. Heart  is normal in size. No effusions are evident. No pneumothorax. Patient  is status post CABG.    CRISTINO SHEIKH MD   Echo Stress Echocardiogram    Narrative    [id number]  [facility]  [id number]  [id number]^CHRIS^MIKALA           [facility]  Echocardiography Laboratory  [address] [place], [place] [address]        Name: JESÚS VASQUEZ  MRN: [id number]  : 1940  Study Date: 2020 08:48 AM  Age: 79 yrs  Gender: Male  Patient Location: [facility]  Reason For Study: Chest Pain  Ordering Physician: MIKALA PEREZ  Referring Physician: BEVERLEY BISWAS  Performed By: Mikey Escalera RDCS     BSA: 2.0 m2  Height: 71 in  Weight: 186 lb  HR: 74  BP: 110/72 mmHg  _____________________________________________________________________________  __        Procedure  Stress Echo Complete.  _____________________________________________________________________________  __         Interpretation Summary  The patient exercised 9:00min according to modified gabriel protocol  The patient exhibited no chest pain during exercise.  The stress EKG is non-diagnostic due to pre-existing EKG abnomalities.  Normal resting wall motion and no stress-induced wall motion abnormality.  This was a normal stress echocardiogram with no evidence of stress-induced  ischemia.  _____________________________________________________________________________  __     Stress  The patient exercised 9:00min.  The patient did not exhibit any symptoms during exercise.  There was a normal BP response to exercise.  Accelerated Modified Gabriel Protocol.  The patient exhibited no chest pain during exercise.  Target Heart Rate was achieved.  Arrhythmia induced during stress: occasional PVC's.  The stress EKG is non-diagnostic due to pre-existing EKG abnomalities.  The visual ejection fraction is estimated at >70%.  Left ventricular cavity size decreases with exercise.  Global LV systolic function augments with exercise.  Normal resting wall motion and no stress-induced wall motion abnormality.  This was a normal stress echocardiogram with no evidence of stress-induced  ischemia.     Baseline  SR, TWI inferior leads , lead 1, avL., lateral percordial leads.  No regional wall motion abnormalities noted.  The visual ejection fraction is estimated at 55-60%.     Stress Results   Protocol:  Accelerated Modified Gabriel        Maximum Predicted HR:   142 bpm   Target HR: 11 bpm *                          % Maximum Predicted HR: 89 %       Stage  DurationHeart Rate  BP                     Comment            (mm:ss)   (bpm)   Stage 0   3:00      102   116/721.7 MPH, 0% Grade   Stage 1   3:00      120   120/701.7 MPH, 10% Grade   Stage 2   3:00      126   140/622.5 MPH, 12% Grade  RecoveryR  4:00      86    137/66FAC Below Average; Duke Score NA; RPP 17,780               Stress Duration:   9:00 mm:ss *        Recovery Time: 4:00 mm:ss          Maximum Stress HR: 127 bpm *           METS:          7     Left Ventricle  Left ventricular systolic function is normal. The visual ejection fraction is  estimated at 55-60%. No regional wall motion abnormalities noted.        Mitral Valve  There is trace mitral regurgitation.     Tricuspid Valve  There is trace tricuspid regurgitation.     Aortic Valve  The aortic valve is trileaflet with aortic valve sclerosis.     Pulmonic Valve  There is mild (1+) pulmonic valvular regurgitation.     _____________________________________________________________________________  __        Doppler Measurements & Calculations  PI end-d roxi: 154.0 cm/sec  TR max roxi: 322.0 cm/sec  TR max P.5 mmHg              _____________________________________________________________________________  __        Report approved by: Moise Martin 2020 09:53 AM

## 2020-01-09 NOTE — PLAN OF CARE
PRIMARY DIAGNOSIS: CHEST PAIN  OUTPATIENT/OBSERVATION GOALS TO BE MET BEFORE DISCHARGE:  1. Ruled out acute coronary syndrome (negative or stable Troponin):  No  2. Pain Status: c/o 1/10 chest pain radiating to bilat arms, shoulder, back  3. Appropriate provocative testing performed: No  - Stress Test Procedure: Echo stress  - Interpretation of cardiac rhythm per telemetry tech: SR w/ 1st deg AV block    4. Cleared by Consultants (if applicable):No  5. Return to near baseline physical activity: Yes  Discharge Planner Nurse   Safe discharge environment identified: Yes  Barriers to discharge: Yes       Entered by: Delilah Greene 01/08/2020      Please review provider order for any additional goals.   Nurse to notify provider when observation goals have been met and patient is ready for discharge.

## 2020-01-09 NOTE — PLAN OF CARE
"A&Ox4. Up SBA. Tolerating cardiac, no caffeine diet.  at bedtime. VSS on RA. C/o 1/10 constant dull chest, bilat arm, shoulder, and back pain-declined interventions. 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 Tele SR 1st degree AVB. Trop (-) x2. Plan for stress echo tomorrow.   "

## 2020-01-09 NOTE — PROGRESS NOTES
PRIMARY DIAGNOSIS: CHEST PAIN  OUTPATIENT/OBSERVATION GOALS TO BE MET BEFORE DISCHARGE:  1. Ruled out acute coronary syndrome (negative or stable Troponin):  yes  2. Pain Status: c/o 0/10 chest pain radiating to bilat arms, shoulder, back  3. Appropriate provocative testing performed: No  - Stress Test Procedure:   - Interpretation of cardiac rhythm per telemetry tech: SR w/ 1st deg AV block     4. Cleared by Consultants (if applicable):No  5. Return to near baseline physical activity: Yes

## 2020-01-10 ENCOUNTER — OFFICE VISIT (OUTPATIENT)
Dept: CARDIOLOGY | Facility: CLINIC | Age: 80
End: 2020-01-10
Payer: COMMERCIAL

## 2020-01-10 ENCOUNTER — TELEPHONE (OUTPATIENT)
Dept: INTERNAL MEDICINE | Facility: CLINIC | Age: 80
End: 2020-01-10

## 2020-01-10 VITALS
HEART RATE: 72 BPM | HEIGHT: 71 IN | BODY MASS INDEX: 27.23 KG/M2 | DIASTOLIC BLOOD PRESSURE: 54 MMHG | WEIGHT: 194.5 LBS | SYSTOLIC BLOOD PRESSURE: 116 MMHG

## 2020-01-10 DIAGNOSIS — I10 BENIGN ESSENTIAL HYPERTENSION: Primary | ICD-10-CM

## 2020-01-10 DIAGNOSIS — E78.5 HYPERLIPIDEMIA LDL GOAL <70: ICD-10-CM

## 2020-01-10 DIAGNOSIS — I48.19 PERSISTENT ATRIAL FIBRILLATION (H): ICD-10-CM

## 2020-01-10 PROCEDURE — 99213 OFFICE O/P EST LOW 20 MIN: CPT | Performed by: INTERNAL MEDICINE

## 2020-01-10 ASSESSMENT — MIFFLIN-ST. JEOR: SCORE: 1624.38

## 2020-01-10 NOTE — LETTER
1/10/2020      Andrew Elizalde MD  [address] [facility] [address]      RE: Pascual Perea       Dear Colleague,    I had the pleasure of seeing Pascual Perea in the [facility].    Service Date: 01/10/2020      REASON FOR CARDIOLOGY VISIT:  Recent hospitalization.      HISTORY OF PRESENT ILLNESS:  Mr. 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 Hay in EP Clinic and Joleen Marrero, and amiodarone was recently discontinued because of abnormal liver enzymes, other comorbidities of dyslipidemia, hypertension, obstructive sleep apnea, diabetes.  To be noted, the patient was recently admitted earlier this week because of some atypical chest discomfort.  He was ruled out for acute coronary syndrome with serial troponins negative.  No new EKG changes, although baseline EKG shows nonspecific T-wave inversions, he underwent exercise stress echocardiogram where he exercised for 9 minutes without any chest discomfort and no stress-induced regional wall motion abnormalities.  To be noted, the patient tells me that about a week ago when he was at his home, he suddenly felt abnormal sensation in his head.  He felt dizzy and the sensation went down to his chest and throughout his whole body, and then the next few days he had some very mild achy sensation in the chest which he describes not even 1/10 in intensity.  No nausea, no sweating, no shortness of breath.  No exertional component to discomfort, and this discomfort has now completely resolved.  He feels quite well.  He is on apixaban.  He is on pravastatin, lisinopril as noted above.  Recently amiodarone was stopped by EP because of abnormal liver enzymes.  He had an EKG done just a few days ago that shows sinus rhythm with first-degree AV block with AR interval around 260 milliseconds and diffuse T-wave  inversions similar to previous EKG.            The patient also has a followup next month with EP.  He is wearing Zio Patch for 2 weeks, and he started wearing it just yesterday.      PHYSICAL EXAMINATION:   VITAL SIGNS:  Blood pressure 116/54, heart rate 72 and regular, weight 194 pounds, BMI 27.13.   GENERAL:  The patient appears pleasant, comfortable.   NECK:  Normal JVP, no bruit.   CARDIOVASCULAR SYSTEM:  There is grade 2/6 very early peaking ejection systolic murmur heard best over the right upper sternal border, no S3, S4, rub or gallop.   RESPIRATORY SYSTEM:  Clear to auscultation bilaterally.   GASTROINTESTINAL SYSTEM:  Abdomen soft, nontender.   EXTREMITIES:  No pitting pedal edema.   NEUROLOGICAL:  Alert, oriented x3.   PSYCH:  Normal affect.   SKIN:  No obvious rash.   HEENT:  No pallor or icterus.      IMPRESSION AND PLAN:  A pleasant 79-year-old gentleman who recently had admission for what seems like very atypical chest discomfort, very mild in nature, was ruled out for acute coronary syndrome, had a low-risk stress test negative for inducible ischemia with normal LV function with what seems like maybe early mild aortic stenosis.  He also has atrial fibrillation, was on rhythm control strategy, following EP and was on amiodarone but that was recently discontinued because of abnormal liver enzymes.  His rhythm is regular today and on recent EKG was in sinus.  I had a long discussion with the patient regarding the nature of his presentation, which overall to me suggests low to only intermediate pretest probability of coronary artery disease, and subsequently stress test was negative.  He just started weaning Zio Patch monitor, which I think is reasonable given that he had an episode of dizziness about a week ago.  I recommend that he finish the Zio Patch monitoring for 2 weeks and keep his followup appointment with EP next month, where results of the Zio Patch can be evaluated.  Of course, in the  interim, if he notices any recurrence of symptoms or new symptoms, especially if any exertion-related symptoms, he should call our clinic.      cc:   Rebekah Marrero PA-C    [facility] [address], [address]   [place]  [address]       Satnam Jarvis MD    [facility] [address], [address]   [place]  [address]         TD GUEVARA MD             D: 01/10/2020   T: 01/10/2020   MT: SVETLANA      Name:     JESÚS VASQUEZ   MRN:      [id number]        Account:      [id number]   :      1940           Service Date: 01/10/2020      Document: [id number]         Outpatient Encounter Medications as of 1/10/2020   Medication Sig Dispense Refill     apixaban ANTICOAGULANT (ELIQUIS) 5 MG tablet Take 1 tablet (5 mg) by mouth 2 times daily 180 tablet 2     azelastine (ASTELIN) 0.1 % nasal spray Spray 2 sprays into both nostrils 2 times daily 1 Bottle 11     Cholecalciferol (VITAMIN D3 PO) Take 5,000 Units by mouth daily       famotidine (PEPCID) 20 MG tablet Take 2 tablets (40 mg) by mouth 2 times daily 180 tablet 3     fexofenadine (ALLEGRA) 180 MG tablet Take 1 tablet (180 mg) by mouth daily 90 tablet 3     furosemide (LASIX) 20 MG tablet TAKE ONE TABLET BY MOUTH ONCE DAILY 30 tablet 9     glucose 4 g CHEW Take 1 tablet by mouth every hour as needed for low blood sugar       insulin glargine (LANTUS PEN) 100 UNIT/ML pen Inject 10 Units Subcutaneous 2 times daily 3 mL 0     levothyroxine (SYNTHROID/LEVOTHROID) 125 MCG tablet Take 1 tablet (125 mcg) by mouth daily 90 tablet 1     lisinopril (PRINIVIL/ZESTRIL) 30 MG tablet Take 1 tablet (30 mg) by mouth daily 90 tablet 3     nitroglycerin (NITROSTAT) 0.4 MG SL tablet Place 1 tablet (0.4 mg) under the tongue every 5 minutes as needed for chest pain 25 tablet 1     NOVOFINE 30 30G X 8 MM insulin pen needle USE FOUR TIMES DAILY. PATIENT NEEDS OFFICE VISIT 400 each 1     pravastatin (PRAVACHOL) 80 MG tablet Take  1 tablet (80 mg) by mouth At Bedtime 90 tablet 2     [DISCONTINUED] insulin aspart (NOVOLOG FLEXPEN) 100 UNIT/ML pen Inject 3 times daily (before meals). 2 units per carb choice (15gm). Approx 5-10 units/meal. 30 mL 3     [DISCONTINUED] insulin detemir (LEVEMIR FLEXPEN/FLEXTOUCH) 100 UNIT/ML pen Inject 30-35 Units Subcutaneous At Bedtime 45 mL 3     No facility-administered encounter medications on file as of 1/10/2020.        Again, thank you for allowing me to participate in the care of your patient.      Sincerely,    Phoenix Adams MD     [facility]

## 2020-01-10 NOTE — LETTER
1/10/2020    Andrew Elizalde MD  [address] [facility] [address]    RE: Pascual Perea       Dear Colleague,    I had the pleasure of seeing Pascual Perea in the [facility].    HPI and Plan:   See dictation(#[id number])    No orders of the defined types were placed in this encounter.      No orders of the defined types were placed in this encounter.      There are no discontinued medications.      Encounter Diagnoses   Name Primary?     Benign essential hypertension Yes     Persistent atrial fibrillation      Hyperlipidemia LDL goal <70        CURRENT MEDICATIONS:  Current Outpatient Medications   Medication Sig Dispense Refill     apixaban ANTICOAGULANT (ELIQUIS) 5 MG tablet Take 1 tablet (5 mg) by mouth 2 times daily 180 tablet 2     azelastine (ASTELIN) 0.1 % nasal spray Spray 2 sprays into both nostrils 2 times daily 1 Bottle 11     Cholecalciferol (VITAMIN D3 PO) Take 5,000 Units by mouth daily       famotidine (PEPCID) 20 MG tablet Take 2 tablets (40 mg) by mouth 2 times daily 180 tablet 3     fexofenadine (ALLEGRA) 180 MG tablet Take 1 tablet (180 mg) by mouth daily 90 tablet 3     furosemide (LASIX) 20 MG tablet TAKE ONE TABLET BY MOUTH ONCE DAILY 30 tablet 9     glucose 4 g CHEW Take 1 tablet by mouth every hour as needed for low blood sugar       insulin aspart (NOVOLOG FLEXPEN) 100 UNIT/ML pen Inject 3 times daily (before meals). 2 units per carb choice (15gm). Approx 5-10 units/meal. 30 mL 3     insulin detemir (LEVEMIR FLEXPEN/FLEXTOUCH) 100 UNIT/ML pen Inject 30-35 Units Subcutaneous At Bedtime 45 mL 3     insulin glargine (LANTUS PEN) 100 UNIT/ML pen Inject 10 Units Subcutaneous 2 times daily 3 mL 0     levothyroxine (SYNTHROID/LEVOTHROID) 125 MCG tablet Take 1 tablet (125 mcg) by mouth daily 90 tablet 1     lisinopril (PRINIVIL/ZESTRIL) 30 MG tablet Take 1 tablet (30 mg) by mouth daily 90 tablet 3     nitroglycerin (NITROSTAT) 0.4 MG SL tablet Place 1 tablet (0.4  mg) under the tongue every 5 minutes as needed for chest pain 25 tablet 1     NOVOFINE 30 30G X 8 MM insulin pen needle USE FOUR TIMES DAILY. PATIENT NEEDS OFFICE VISIT 400 each 1     pravastatin (PRAVACHOL) 80 MG tablet Take 1 tablet (80 mg) by mouth At Bedtime 90 tablet 2       ALLERGIES     Allergies   Allergen Reactions     Omeprazole      diarrhea       PAST MEDICAL HISTORY:  Past Medical History:   Diagnosis Date     Adhesive capsulitis of shoulder      Anemia 3/10/2014     Anemia, unspecified      Antiplatelet or antithrombotic long-term use      Arrhythmia     Atrial fib/flutter     CAD (coronary artery disease)      History of cardioversion 04/24/2018    a single synchronized shock of 120 joules restored normal sinus rhythm     History of cardioversion 02/13/2019    single shock , 200 joules successul in restoring NSR     Hyperlipidemia LDL goal <70 5/26/2011     Hypertension      Hypothyroidism      Impotence of organic origin      Laceration of finger  June 2010     left thumb s/p sutures     Numbness and tingling     diabetic neuropathy bilateral feet     BRANDON (obstructive sleep apnea)     intolerant of CPAP, uses it occasionally.  Using mandibular device occasionally     Osteopenia      Pulmonary hypertension (H) 4/6/2012     Sensorineural hearing loss, unspecified      Stented coronary artery 1997    CABG      Testosterone deficiency      Type 2 diabetes mellitus without complication  (goal A1C<8) 10/24/2015     Typical atrial flutter (H) 4/18/16     Unspecified hypothyroidism      Vitamin D deficiency 9/3/2011       PAST SURGICAL HISTORY:  Past Surgical History:   Procedure Laterality Date     ANESTHESIA CARDIOVERSION N/A 2/13/2019    Procedure: ANESTHESIA CARDIOVERSION;  Surgeon: GENERIC ANESTHESIA PROVIDER;  Location:  OR     ANESTHESIA CARDIOVERSION N/A 5/22/2019    Procedure: ANESTHESIA, FOR CARDIOVERSION;  Surgeon: GENERIC ANESTHESIA PROVIDER;  Location:  OR     C NONSPECIFIC PROCEDURE  1995     CABG ([facility])     C NONSPECIFIC PROCEDURE  8/01    stress echo     CARDIAC SURGERY      bypass in 1997     CARDIOVERSION  04/24/2018     COLONOSCOPY       CORONARY ANGIOGRAPHY ADULT ORDER      4/2/2012     CORONARY ARTERY BYPASS  1997    [facility]     ENDOSCOPIC ULTRASOUND UPPER GASTROINTESTINAL TRACT (GI) N/A 3/14/2019    Procedure: ENDOSCOPIC ULTRASOUND OF UPPER GASTROINTESTINAL TRACT;  Surgeon: Ju Torres MD;  Location:  OR     EXCISE MASS HEAD Left 8/18/2016    Procedure: EXCISE MASS HEAD;  Surgeon: Ivan Hernandez MD;  Location: [facility]     HEART CATH, ANGIOPLASTY  4/2012     LAPAROSCOPIC CHOLECYSTECTOMY N/A 3/17/2019    Procedure: LAPAROSCOPIC CHOLECYSTECTOMY;  Surgeon: Janel Paz MD;  Location:  OR     PHACOEMULSIFICATION CLEAR CORNEA WITH STANDARD INTRAOCULAR LENS IMPLANT Left 6/8/2015    Procedure: PHACOEMULSIFICATION CLEAR CORNEA WITH STANDARD INTRAOCULAR LENS IMPLANT;  Surgeon: Nixon Farnsworth MD;  Location:  EC     PHACOEMULSIFICATION CLEAR CORNEA WITH STANDARD INTRAOCULAR LENS IMPLANT Right 6/22/2015    Procedure: PHACOEMULSIFICATION CLEAR CORNEA WITH STANDARD INTRAOCULAR LENS IMPLANT;  Surgeon: Nixon Farnsworth MD;  Location:  EC     SEPTOPLASTY, TURBINOPLASTY, COMBINED Bilateral 8/18/2016    Procedure: COMBINED SEPTOPLASTY, TURBINOPLASTY;  Surgeon: Ivan Hernandez MD;  Location: [facility]       FAMILY HISTORY:  Family History   Problem Relation Age of Onset     Genitourinary Problems Father         d: age 89; ARF     Hypertension Father      Diabetes Mother         d: age 68, CAD     Heart Disease Mother         MI     Myocardial Infarction Mother      Cardiovascular Brother         d:  age 31; CAD     Heart Disease Brother         age 31, MI     Diabetes Sister         b:  1939; DM2       SOCIAL HISTORY:  Social History     Socioeconomic History     Marital status:      Spouse name: None     Number of children: None      Years of education: None     Highest education level: None   Occupational History     None   Social Needs     Financial resource strain: None     Food insecurity:     Worry: None     Inability: None     Transportation needs:     Medical: None     Non-medical: None   Tobacco Use     Smoking status: Former Smoker     Packs/day: 1.00     Years: 6.00     Pack years: 6.00     Start date:      Last attempt to quit: 1964     Years since quittin.5     Smokeless tobacco: Never Used   Substance and Sexual Activity     Alcohol use: Yes     Comment: couple drinks a year     Drug use: No     Sexual activity: Yes     Partners: Female   Lifestyle     Physical activity:     Days per week: None     Minutes per session: None     Stress: None   Relationships     Social connections:     Talks on phone: None     Gets together: None     Attends Quaker service: None     Active member of club or organization: None     Attends meetings of clubs or organizations: None     Relationship status: None     Intimate partner violence:     Fear of current or ex partner: None     Emotionally abused: None     Physically abused: None     Forced sexual activity: None   Other Topics Concern     Parent/sibling w/ CABG, MI or angioplasty before 65F 55M? Not Asked      Service Not Asked     Blood Transfusions Not Asked     Caffeine Concern Yes     Comment: 2 cups coffee a day     Occupational Exposure Not Asked     Hobby Hazards Not Asked     Sleep Concern Yes     Comment: sleep apnea, wears cpap off and on     Stress Concern No     Weight Concern No     Special Diet Yes     Comment: diabetic diet      Back Care Not Asked     Exercise No     Comment: occ walk the mall     Bike Helmet Not Asked     Seat Belt Yes     Self-Exams Not Asked   Social History Narrative     None       Review of Systems:  Skin:  Negative       Eyes:  Positive for glasses reading  ENT:  Positive for hearing loss wears aides  Respiratory:  Positive for  "cough;dyspnea on exertion dry cough   Cardiovascular:    fatigue;Positive for    Gastroenterology: Positive for heartburn treated  Genitourinary:  Negative      Musculoskeletal:  Positive for arthritis;joint pain    Neurologic:  Positive for numbness or tingling of feet neuropathy  Psychiatric:  Negative      Heme/Lymph/Imm:  Negative      Endocrine:  Positive for thyroid disorder;diabetes      Physical Exam:  Vitals: /54   Pulse 72   Ht 1.803 m (5' 11\")   Wt 88.2 kg (194 lb 8 oz)   BMI 27.13 kg/m       Constitutional:           Skin:             Head:           Eyes:           Lymph:      ENT:           Neck:           Respiratory:            Cardiac:                                                           GI:           Extremities and Muscular Skeletal:                 Neurological:           Psych:             CC  Andrew Elizalde MD  [address] [place] [address]                    Thank you for allowing me to participate in the care of your patient.      Sincerely,     Phoenix Adams MD     [facility]    cc:   Andrew Elizalde MD  [address] [place] [address]        "

## 2020-01-10 NOTE — TELEPHONE ENCOUNTER
"      Doing ust fine.  Appt with cardio on 2/17.   Tired, achey,     Hospital/TCU/ED for chronic condition Discharge Protocol    \"Hi, my name is Lenore Gonzales RN, a registered nurse, and I am calling from [facility].  I am calling to follow up and see how things are going for you after your recent emergency visit/hospital/TCU stay.\"    Tell me how you are doing now that you are home?\" I am doing just fine. Feeling tired and achey. Had appt with cardio today (1/10). And next appt with cardio scheduled for 2/17.  Hospital f/u scheduled with Dr. Elizalde for Tuesday, 1/14 @ 10:30am.      Discharge Instructions    \"Let's review your discharge instructions.  What is/are the follow-up recommendations?  Pt. Response: see notes above.    \"Has an appointment with your primary care provider been scheduled?\"   Yes. (confirm)    \"When you see the provider, I would recommend that you bring your medications with you.\"    Medications    \"Tell me what changed about your medicines when you discharged?\"    Changes to chronic meds?    0-1    \"What questions do you have about your medications?\"    None     New diagnoses of heart failure, COPD, diabetes, or MI?    No     On insulin: \"Did you start on insulin in the hospital or did you have your insulin dose changed?\"  No         Post Discharge Medication Reconciliation Status: discharge medications reconciled, continue medications without change.    Was MTM referral placed (*Make sure to put transitions as reason for referral)?   No    Call Summary    \"What questions or concerns do you have about your recent visit and your follow-up care?\"     none    \"If you have questions or things don't continue to improve, we encourage you contact us through the main clinic number (give number).  Even if the clinic is not open, triage nurses are available 24/7 to help you.     We would like you to know that our clinic has extended hours (provide information).  We also have urgent care (provide " "details on closest location and hours/contact info)\"      \"Thank you for your time and take care!\"             "

## 2020-01-10 NOTE — PROGRESS NOTES
HPI and Plan:   See dictation(#[id number])    No orders of the defined types were placed in this encounter.      No orders of the defined types were placed in this encounter.      There are no discontinued medications.      Encounter Diagnoses   Name Primary?     Benign essential hypertension Yes     Persistent atrial fibrillation      Hyperlipidemia LDL goal <70        CURRENT MEDICATIONS:  Current Outpatient Medications   Medication Sig Dispense Refill     apixaban ANTICOAGULANT (ELIQUIS) 5 MG tablet Take 1 tablet (5 mg) by mouth 2 times daily 180 tablet 2     azelastine (ASTELIN) 0.1 % nasal spray Spray 2 sprays into both nostrils 2 times daily 1 Bottle 11     Cholecalciferol (VITAMIN D3 PO) Take 5,000 Units by mouth daily       famotidine (PEPCID) 20 MG tablet Take 2 tablets (40 mg) by mouth 2 times daily 180 tablet 3     fexofenadine (ALLEGRA) 180 MG tablet Take 1 tablet (180 mg) by mouth daily 90 tablet 3     furosemide (LASIX) 20 MG tablet TAKE ONE TABLET BY MOUTH ONCE DAILY 30 tablet 9     glucose 4 g CHEW Take 1 tablet by mouth every hour as needed for low blood sugar       insulin aspart (NOVOLOG FLEXPEN) 100 UNIT/ML pen Inject 3 times daily (before meals). 2 units per carb choice (15gm). Approx 5-10 units/meal. 30 mL 3     insulin detemir (LEVEMIR FLEXPEN/FLEXTOUCH) 100 UNIT/ML pen Inject 30-35 Units Subcutaneous At Bedtime 45 mL 3     insulin glargine (LANTUS PEN) 100 UNIT/ML pen Inject 10 Units Subcutaneous 2 times daily 3 mL 0     levothyroxine (SYNTHROID/LEVOTHROID) 125 MCG tablet Take 1 tablet (125 mcg) by mouth daily 90 tablet 1     lisinopril (PRINIVIL/ZESTRIL) 30 MG tablet Take 1 tablet (30 mg) by mouth daily 90 tablet 3     nitroglycerin (NITROSTAT) 0.4 MG SL tablet Place 1 tablet (0.4 mg) under the tongue every 5 minutes as needed for chest pain 25 tablet 1     NOVOFINE 30 30G X 8 MM insulin pen needle USE FOUR TIMES DAILY. PATIENT NEEDS OFFICE VISIT 400 each 1     pravastatin (PRAVACHOL) 80 MG  tablet Take 1 tablet (80 mg) by mouth At Bedtime 90 tablet 2       ALLERGIES     Allergies   Allergen Reactions     Omeprazole      diarrhea       PAST MEDICAL HISTORY:  Past Medical History:   Diagnosis Date     Adhesive capsulitis of shoulder      Anemia 3/10/2014     Anemia, unspecified      Antiplatelet or antithrombotic long-term use      Arrhythmia     Atrial fib/flutter     CAD (coronary artery disease)      History of cardioversion 04/24/2018    a single synchronized shock of 120 joules restored normal sinus rhythm     History of cardioversion 02/13/2019    single shock , 200 joules successul in restoring NSR     Hyperlipidemia LDL goal <70 5/26/2011     Hypertension      Hypothyroidism      Impotence of organic origin      Laceration of finger  June 2010     left thumb s/p sutures     Numbness and tingling     diabetic neuropathy bilateral feet     BRANDON (obstructive sleep apnea)     intolerant of CPAP, uses it occasionally.  Using mandibular device occasionally     Osteopenia      Pulmonary hypertension (H) 4/6/2012     Sensorineural hearing loss, unspecified      Stented coronary artery 1997    CABG      Testosterone deficiency      Type 2 diabetes mellitus without complication  (goal A1C<8) 10/24/2015     Typical atrial flutter (H) 4/18/16     Unspecified hypothyroidism      Vitamin D deficiency 9/3/2011       PAST SURGICAL HISTORY:  Past Surgical History:   Procedure Laterality Date     ANESTHESIA CARDIOVERSION N/A 2/13/2019    Procedure: ANESTHESIA CARDIOVERSION;  Surgeon: GENERIC ANESTHESIA PROVIDER;  Location:  OR     ANESTHESIA CARDIOVERSION N/A 5/22/2019    Procedure: ANESTHESIA, FOR CARDIOVERSION;  Surgeon: GENERIC ANESTHESIA PROVIDER;  Location: [facility] OR     C NONSPECIFIC PROCEDURE  1995    CABG ([facility])     C NONSPECIFIC PROCEDURE  8/01    stress echo     CARDIAC SURGERY      bypass in 1997     CARDIOVERSION  04/24/2018     COLONOSCOPY       CORONARY ANGIOGRAPHY ADULT ORDER      4/2/2012      CORONARY ARTERY BYPASS  1997    [facility] to [facility]AD     ENDOSCOPIC ULTRASOUND UPPER GASTROINTESTINAL TRACT (GI) N/A 3/14/2019    Procedure: ENDOSCOPIC ULTRASOUND OF UPPER GASTROINTESTINAL TRACT;  Surgeon: Ju Torres MD;  Location:  OR     EXCISE MASS HEAD Left 8/18/2016    Procedure: EXCISE MASS HEAD;  Surgeon: Ivan Hernandez MD;  Location: [facility]     HEART CATH, ANGIOPLASTY  4/2012     LAPAROSCOPIC CHOLECYSTECTOMY N/A 3/17/2019    Procedure: LAPAROSCOPIC CHOLECYSTECTOMY;  Surgeon: Janel Paz MD;  Location:  OR     PHACOEMULSIFICATION CLEAR CORNEA WITH STANDARD INTRAOCULAR LENS IMPLANT Left 6/8/2015    Procedure: PHACOEMULSIFICATION CLEAR CORNEA WITH STANDARD INTRAOCULAR LENS IMPLANT;  Surgeon: Nixon Farnsworth MD;  Location:  [facility]     PHACOEMULSIFICATION CLEAR CORNEA WITH STANDARD INTRAOCULAR LENS IMPLANT Right 6/22/2015    Procedure: PHACOEMULSIFICATION CLEAR CORNEA WITH STANDARD INTRAOCULAR LENS IMPLANT;  Surgeon: Nixon Farnsworth MD;  Location:  [facility]     SEPTOPLASTY, TURBINOPLASTY, COMBINED Bilateral 8/18/2016    Procedure: COMBINED SEPTOPLASTY, TURBINOPLASTY;  Surgeon: Ivan Hernandez MD;  Location: [facility]       FAMILY HISTORY:  Family History   Problem Relation Age of Onset     Genitourinary Problems Father         d: age 89; ARF     Hypertension Father      Diabetes Mother         d: age 68, CAD     Heart Disease Mother         MI     Myocardial Infarction Mother      Cardiovascular Brother         d:  age 31; CAD     Heart Disease Brother         age 31, MI     Diabetes Sister         b:  1939; DM2       SOCIAL HISTORY:  Social History     Socioeconomic History     Marital status:      Spouse name: None     Number of children: None     Years of education: None     Highest education level: None   Occupational History     None   Social Needs     Financial resource strain: None     Food insecurity:     Worry: None     Inability: None      Transportation needs:     Medical: None     Non-medical: None   Tobacco Use     Smoking status: Former Smoker     Packs/day: 1.00     Years: 6.00     Pack years: 6.00     Start date:      Last attempt to quit: 1964     Years since quittin.5     Smokeless tobacco: Never Used   Substance and Sexual Activity     Alcohol use: Yes     Comment: couple drinks a year     Drug use: No     Sexual activity: Yes     Partners: Female   Lifestyle     Physical activity:     Days per week: None     Minutes per session: None     Stress: None   Relationships     Social connections:     Talks on phone: None     Gets together: None     Attends Alevism service: None     Active member of club or organization: None     Attends meetings of clubs or organizations: None     Relationship status: None     Intimate partner violence:     Fear of current or ex partner: None     Emotionally abused: None     Physically abused: None     Forced sexual activity: None   Other Topics Concern     Parent/sibling w/ CABG, MI or angioplasty before 65F 55M? Not Asked      Service Not Asked     Blood Transfusions Not Asked     Caffeine Concern Yes     Comment: 2 cups coffee a day     Occupational Exposure Not Asked     Hobby Hazards Not Asked     Sleep Concern Yes     Comment: sleep apnea, wears cpap off and on     Stress Concern No     Weight Concern No     Special Diet Yes     Comment: diabetic diet      Back Care Not Asked     Exercise No     Comment: occ walk the mall     Bike Helmet Not Asked     Seat Belt Yes     Self-Exams Not Asked   Social History Narrative     None       Review of Systems:  Skin:  Negative       Eyes:  Positive for glasses reading  ENT:  Positive for hearing loss wears aides  Respiratory:  Positive for cough;dyspnea on exertion dry cough   Cardiovascular:    fatigue;Positive for    Gastroenterology: Positive for heartburn treated  Genitourinary:  Negative      Musculoskeletal:  Positive for arthritis;joint  "pain    Neurologic:  Positive for numbness or tingling of feet neuropathy  Psychiatric:  Negative      Heme/Lymph/Imm:  Negative      Endocrine:  Positive for thyroid disorder;diabetes      Physical Exam:  Vitals: /54   Pulse 72   Ht 1.803 m (5' 11\")   Wt 88.2 kg (194 lb 8 oz)   BMI 27.13 kg/m      Constitutional:           Skin:             Head:           Eyes:           Lymph:      ENT:           Neck:           Respiratory:            Cardiac:                                                           GI:           Extremities and Muscular Skeletal:                 Neurological:           Psych:             CC  Andrew Elizalde MD  [address] [place] [address]                  "

## 2020-01-10 NOTE — PROGRESS NOTES
Service Date: 01/10/2020      REASON FOR CARDIOLOGY VISIT:  Recent hospitalization.      HISTORY OF PRESENT ILLNESS:  Mr. 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 Hay in [facility] and Joleen Marrero, and amiodarone was recently discontinued because of abnormal liver enzymes, other comorbidities of dyslipidemia, hypertension, obstructive sleep apnea, diabetes.  To be noted, the patient was recently admitted earlier this week because of some atypical chest discomfort.  He was ruled out for acute coronary syndrome with serial troponins negative.  No new EKG changes, although baseline EKG shows nonspecific T-wave inversions, he underwent exercise stress echocardiogram where he exercised for 9 minutes without any chest discomfort and no stress-induced regional wall motion abnormalities.  To be noted, the patient tells me that about a week ago when he was at his home, he suddenly felt abnormal sensation in his head.  He felt dizzy and the sensation went down to his chest and throughout his whole body, and then the next few days he had some very mild achy sensation in the chest which he describes not even 1/10 in intensity.  No nausea, no sweating, no shortness of breath.  No exertional component to discomfort, and this discomfort has now completely resolved.  He feels quite well.  He is on apixaban.  He is on pravastatin, lisinopril as noted above.  Recently amiodarone was stopped by EP because of abnormal liver enzymes.  He had an EKG done just a few days ago that shows sinus rhythm with first-degree AV block with SC interval around 260 milliseconds and diffuse T-wave inversions similar to previous EKG.      The patient also has a followup next month with EP.  He is wearing Zio Patch for 2 weeks, and he started wearing it just yesterday.      PHYSICAL EXAMINATION:   VITAL SIGNS:  Blood  pressure 116/54, heart rate 72 and regular, weight 194 pounds, BMI 27.13.   GENERAL:  The patient appears pleasant, comfortable.   NECK:  Normal JVP, no bruit.   CARDIOVASCULAR SYSTEM:  There is grade 2/6 very early peaking ejection systolic murmur heard best over the right upper sternal border, no S3, S4, rub or gallop.   RESPIRATORY SYSTEM:  Clear to auscultation bilaterally.   GASTROINTESTINAL SYSTEM:  Abdomen soft, nontender.   EXTREMITIES:  No pitting pedal edema.   NEUROLOGICAL:  Alert, oriented x3.   PSYCH:  Normal affect.   SKIN:  No obvious rash.   HEENT:  No pallor or icterus.      IMPRESSION AND PLAN:  A pleasant 79-year-old gentleman who recently had admission for what seems like very atypical chest discomfort, very mild in nature, was ruled out for acute coronary syndrome, had a low-risk stress test negative for inducible ischemia with normal LV function with what seems like maybe early mild aortic stenosis.  He also has atrial fibrillation, was on rhythm control strategy, following EP and was on amiodarone but that was recently discontinued because of abnormal liver enzymes.  His rhythm is regular today and on recent EKG was in sinus.  I had a long discussion with the patient regarding the nature of his presentation, which overall to me suggests low to only intermediate pretest probability of coronary artery disease, and subsequently stress test was negative.  He just started weaning Zio Patch monitor, which I think is reasonable given that he had an episode of dizziness about a week ago.  I recommend that he finish the Zio Patch monitoring for 2 weeks and keep his followup appointment with EP next month, where results of the Zio Patch can be evaluated.  Of course, in the interim, if he notices any recurrence of symptoms or new symptoms, especially if any exertion-related symptoms, he should call our clinic.      cc:   Rebekah Marrero PA-C    [facility] [address],  [address]   [place]  [address]       Satnam Jarvis MD    [facility] [address], [address]   [place]  [address]         TD GUEVARA MD             D: 01/10/2020   T: 01/10/2020   MT: SVETLANA      Name:     EJSÚS VASQUEZ   MRN:      [id number]        Account:      [id number]   :      1940           Service Date: 01/10/2020      Document: [id number]

## 2020-01-14 ENCOUNTER — OFFICE VISIT (OUTPATIENT)
Dept: INTERNAL MEDICINE | Facility: CLINIC | Age: 80
End: 2020-01-14
Payer: COMMERCIAL

## 2020-01-14 VITALS
TEMPERATURE: 98.8 F | RESPIRATION RATE: 16 BRPM | SYSTOLIC BLOOD PRESSURE: 110 MMHG | WEIGHT: 195 LBS | HEIGHT: 71 IN | DIASTOLIC BLOOD PRESSURE: 62 MMHG | OXYGEN SATURATION: 98 % | HEART RATE: 88 BPM | BODY MASS INDEX: 27.3 KG/M2

## 2020-01-14 DIAGNOSIS — E03.9 HYPOTHYROIDISM, UNSPECIFIED TYPE: ICD-10-CM

## 2020-01-14 DIAGNOSIS — E11.9 TYPE 2 DIABETES MELLITUS WITHOUT COMPLICATION, WITH LONG-TERM CURRENT USE OF INSULIN (H): ICD-10-CM

## 2020-01-14 DIAGNOSIS — R07.89 ATYPICAL CHEST PAIN: ICD-10-CM

## 2020-01-14 DIAGNOSIS — E78.5 HYPERLIPIDEMIA LDL GOAL <70: ICD-10-CM

## 2020-01-14 DIAGNOSIS — K75.9 HEPATITIS: ICD-10-CM

## 2020-01-14 DIAGNOSIS — M54.2 CERVICALGIA: ICD-10-CM

## 2020-01-14 DIAGNOSIS — I48.91 ATRIAL FIBRILLATION, UNSPECIFIED TYPE (H): ICD-10-CM

## 2020-01-14 DIAGNOSIS — Z79.4 TYPE 2 DIABETES MELLITUS WITHOUT COMPLICATION, WITH LONG-TERM CURRENT USE OF INSULIN (H): ICD-10-CM

## 2020-01-14 DIAGNOSIS — E34.9 TESTOSTERONE DEFICIENCY: ICD-10-CM

## 2020-01-14 PROCEDURE — 99495 TRANSJ CARE MGMT MOD F2F 14D: CPT | Performed by: INTERNAL MEDICINE

## 2020-01-14 ASSESSMENT — MIFFLIN-ST. JEOR: SCORE: 1626.64

## 2020-01-14 NOTE — PROGRESS NOTES
Subjective     Pascual Perea is a 79 year old male who presents to clinic today for the following health issues:    HPI       Hospital Follow-up Visit:    Hospital/Nursing Home/IP Rehab Facility: [facility]  Date of Admission: 01/08/2020  Date of Discharge: 01/09/2020  Reason(s) for Admission: Chest Pain  Tele contact 1/20/20            Problems taking medications regularly:  None       Medication changes since discharge: None       Problems adhering to non-medication therapy:  None    Summary of hospitalization:  [facility] discharge summary reviewed  Diagnostic Tests/Treatments reviewed.  Follow up needed:  See orders below  Other Healthcare Providers Involved in Patient s Care:         cardiology  Update since discharge: improved.     Post Discharge Medication Reconciliation: discharge medications reconciled and changed, per note/orders (see AVS).  Plan of care communicated with patient     Coding guidelines for this visit:  Type of Medical   Decision Making Face-to-Face Visit       within 7 Days of discharge Face-to-Face Visit        within 14 days of discharge   Moderate Complexity [id number]   High Complexity [id number]            Discharge summary reviewed. Part of the summary as below:    Discharge Summary  Hospitalist     Date of Admission:  1/8/2020  Date of Discharge:  1/9/2020  Discharging Provider: Robby Doyle PA-C        Discharge Diagnoses        Chest pain, unspecified type  Hx of CABG  Gastroesophageal reflux disease without esophagitis  Type 2 diabetes mellitus without complication, with long-term current use of insulin (H)  Atrial fibrillation, unspecified type (H)        History of Present Illness     Pascual Perea is an 79 year old male who presented with shortness of breath and fatigue.     HPI from admission H&P:  History is obtained from patient, his son, daughter by the bedside, ED team and review of medical records.     Pascual Perea is a 79 year old   male with medical history of coronary artery disease status post CABG, atrial fibrillation on anticoagulation, hypertension, hyperlipidemia, uncontrolled diabetes mellitus presented to the ED with shortness of breath and fatigue.     Per patient report symptoms started on 1/3/2019 when he was getting ready for bed, had taken 4 steps felt pressure in his head and felt out of it.  Subsequently had pain radiating down his chest and down his legs out of his body.  Reports after that episode continue to have pressure in his head, chest pain, shoulder pain, arm pain, back pain ongoing for 5 days now.  Chest pain mid chest 2/10 in intensity.  No fever or chills.  Had noted increased fatigue, shortness of breath today.  Able to carry on activities of daily living, lives independently.  Uses 1 pillow to sleep at night, no new cough, no wheezing.  No leg swelling.  No recent travel, no exposure to sick contacts.  No abdominal pain, no diarrhea or constipation.  No new tingling numbness or weakness in hands and legs.  No new joint pains.  No focal weakness.     In ED afebrile, heart rate in 70s to 80s.  Physical exam nonrevealing.  WBC 4.6, troponin less than 0.015, TSH 0.44, .  EKG sinus rhythm with first-degree AV block.  ST-T wave changes in inferior, lateral leads slightly more prominent when compared to previous EKG..  Chest x-ray no acute pathology.        Hospital Course     Pascual Perea was admitted on 1/8/2020.  The following problems were addressed during his hospitalization:     Atypical chest pain, SOB rule out acute coronary syndrome  Coronary artery disease status post CABG over 20 years ago  Hypertension  Per patient report symptoms started on 1/3/2019 when he was getting ready for bed, had taken 4 steps felt pressure in his head and felt out of it.  Subsequently had pain radiating down his chest and down his legs out of his body.  Reports after that episode continue to have pressure in his head,  chest pain, shoulder pain, arm pain, back pain ongoing for 5 days now.  Had noted increased fatigue, shortness of breath today.  Able to carry on activities of daily living, lives independently. Workup in ED unrevealing, troponin negative. He was admitted under observation care, stress echo 1/9 negative.  - Zio patch placed, ?intermittent episodes of RVR given sx lightheadedness, head pressure  - Cardiology follow-up requested  - Continued on PTA medications without changes     Atrial fibrillation on Eliquis  Telemetry monitoring.  TSH 0.44. Continue PTA Eliquis.      Acute kidney injury likely prerenal  CKD stage II  Baseline creatinine around 1-1.1.  Presented with creatinine of 1.2.     Hyperglycemia from uncontrolled diabetes.  Uncontrolled diabetes mellitus with a hemoglobin A1c of 9.4.  Presented with blood sugars of 312.  - Resumed PTA prandial insulin  - Started Lantus 10u BID     Physical deconditioning likely from senile fragility, ongoing illness.  Lives independently, carries on all activities of daily living independently.     Hypothyroidism.  Hold PTA levothyroxine while under observation status.     Hyperlipidemia.  Hold PTA pravastatin while under observation status, check liver function test given recent deranged LFTs.     Robby Doyle PA-C      Pt's past medical history, family history, habits, medications and allergies were reviewed with the patient today.  See snap shot for  HCM status. Most recent lab results reviewed with pt. Problem list and histories reviewed & adjusted, as indicated.  Additional history as below:      Had neg stress ECHO   Has Ziopatch on now. Hx A fib and on AC. No palpitations recently  No exertional chest pain or shortness of breath  with walking currently  Doing remodeling project in basement currently  Chest pain is shap when occurs in chest and has not had  Yesterday or today.   Doing work above head with painting . Has some neck stiffness.soreness. Rare soresness in  "upper arms. No numbness  and no radiation.   Pt recenty increase his Novolog to 3 units/choice. Blood sugars  AM 140s and now stable through the day    Lab Results   Component Value Date     01/09/2020     Lab Results   Component Value Date    A1C 8.5 01/08/2020     Lab Results   Component Value Date    CHOL 152 09/30/2019     Lab Results   Component Value Date    LDL 45 09/30/2019     Lab Results   Component Value Date    HDL 35 09/30/2019     Lab Results   Component Value Date    TRIG 360 09/30/2019     Lab Results   Component Value Date    CR 1.09 01/09/2020     Lab Results   Component Value Date    ALT 74 01/08/2020     Lab Results   Component Value Date    AST 42 01/08/2020     Lab Results   Component Value Date    MICROL <5 12/30/2019     Lab Results   Component Value Date    TSH 0.44 01/08/2020             Additional ROS:   Constitutional, HEENT, Cardiovascular, Pulmonary, GI and , Neuro, MSK and Psych review of systems/symptoms are otherwise negative or unchanged from previous, except as noted above.      OBJECTIVE:  /62   Pulse 88   Temp 98.8  F (37.1  C) (Temporal)   Resp 16   Ht 1.803 m (5' 11\")   Wt 88.5 kg (195 lb)   SpO2 98%   BMI 27.20 kg/m     Estimated body mass index is 27.2 kg/m  as calculated from the following:    Height as of this encounter: 1.803 m (5' 11\").    Weight as of this encounter: 88.5 kg (195 lb).     Neck: no adenopathy. Thyroid normal to palpation. No bruits.  Minimal tenderness to full flexion and right lateral movement of the neck  Pulm: Lungs clear to auscultation   CV: Regular rates and rhythm  GI: Soft, nontender, Normal active bowel sounds, No hepatosplenomegaly or masses palpable  Ext: Peripheral pulses intact. No edema.  Neuro: Normal strength and tone, sensory exam grossly normal    Assessment/Plan: (See plan discussion below for further details)    1. Atypical chest pain   Appears related to MSK issues.  Neg Stress test. See #4 below    2. Atrial " fibrillation, unspecified type (H)   Currently in NSR. On AC. Has Ziopatch on now. Management per cardiology    3. Type 2 diabetes mellitus without complication, with long-term current use of insulin (H)   AM sugars still a little high but otherwise at goal since Novolog dose changed to 3 units/choice. Med change as below. Repeat labs 3 mos  - insulin aspart (NOVOLOG FLEXPEN) 100 UNIT/ML pen; Inject 3 times daily (before meals). 3 units per carb choice (15gm). Approx 10-15 units/meal.  Dispense: 45 mL; Refill: 3  - insulin detemir (LEVEMIR FLEXPEN/FLEXTOUCH) 100 UNIT/ML pen; Inject 40 Units Subcutaneous At Bedtime  Dispense: 45 mL; Refill: 3  - Hemoglobin A1c; Future  - Comprehensive metabolic panel; Future    4. Cervicalgia  Related to recent remodeling project. No UE weakness or radiculopathy. See plan discussion below.     5. Hyperlipidemia LDL goal <70  On statin. Future ;labs as ordered  - Lipid panel reflex to direct LDL Fasting; Future  - Comprehensive metabolic panel; Future    6. Hypothyroidism, unspecified type   On med and at goal. Future labs as ordered  - TSH with free T4 reflex; Future    7. Hepatitis   Minimal SLT elevation. ? Related to recent worsened diabetic control vs meds. Will recheck 3 mos after better blood sugar control. Continue same meds for now  - Comprehensive metabolic panel; Future    8. Testosterone deficiency   PSA dropped to low normal after  testosterone stopped.  Once heart issues stabilized, will retry therapy to see if PSA remains stable (indicating other temporary cause) or if has rise again. Pt does have some fatigue off of testosterone. If PSA rises again acutely, will have to remain off of testosterone permanently      Plan discussion:    Increase Levemir to 40 units daily   Increase Novolog to 3 units/carb choice with meals  Stretching exercise for neck in AM   Finish Chao and mail to company to be read when completed  Email me in [employer]  in mid February. If  Heart  issues stable, will then  try restarting Testosterone therapy  Fasting labs in mid April 2020         Andrew Elizalde MD  Internal Medicine Department  [facility]    (Chart documentation was completed, in part, with shipbeat voice-recognition software. Even though reviewed, some grammatical, spelling, and word errors may remain.)

## 2020-01-14 NOTE — PATIENT INSTRUCTIONS
Increase Levemir to 40 units daily   Increase Novolog to 3 units/carb choice with meals  Stretching exercise for neck in AM   Finish Chao and mail to company to be read when completed  Email me in [employer]  in mid February. If  heart issues stable, will then  try restarting Testosterone therapy  Fasting labs in mid April 2020

## 2020-01-20 ENCOUNTER — MYC MEDICAL ADVICE (OUTPATIENT)
Dept: INTERNAL MEDICINE | Facility: CLINIC | Age: 80
End: 2020-01-20

## 2020-02-16 NOTE — PROGRESS NOTES
HPI:   I had the pleasure of seeing Dayday when he came for follow up of atrial fibrillation.  He is a 78 year old who sees Dr. Jarvis and recently met with Dr. Adams for his history of:     1. Persistent AFib first diagnosed 5/2016, s/p DCCV 2016 and again 4/2018. Started on Amiodarone and underwent DCCV 2/2019 and 5/2019 (after cholecystectomy triggered AFib 3/2019). Dr. Jarvis noted we could proceed with PVI vs DCCV.  Amiodarone stopped 11/2019 due to abnl LFTs.  2. Benign essential hypertension  3. HFpEF, with fluid overload and ascites. EF 55% with mildly decreased RVSF. Low dose Lasix working well  4. CAD s/p CABG 1997. Stress echo 1/2020 during hospitalization 1/2020 was normal.       Dr. Jarvis saw Dayday 11/2019 at which time he noted mild increase in his transaminases. Amiodarone was stopped and routine follow-up was recommended.    He then contacted our office 1/7 via [employer] with c/o recurrent AFib, as well as shoulder aching and chest discomfort that had all started 1/3. He was recommended to go the ER and was admitted 1/8-1/9/2020 for this. Troponins negative, EKG in SR. Stress echo 1/9/2020 was negative. No cardiac medications were adjusted. He was discharged on ZioPatch    He then met with Dr. Adams in follow-up, who recommended he keep EP follow-up to review the ZioPatch    Interval History:  Notes that due to Kina's dementia, she's now residing in a NH/Memory Care.     Explains that he had attempted moving his [employer] entertainment center in 1/2020, which prompted the shoulder and chest discomfort. No discomfort since and pleased to see that the stress test looked so good.    No palpitations or recurrent AFib. Pleased to see ZioPatch showed no significant arrhythmias. Both of the Wenckebach episodes occurred with sleep (he was napping after [employer] on Tuesday 1/21).     Discussed his elevated AST, ALT noted 11/2019. Amiodarone was stopped. Now ALT remains a bit high.  He does use Tylenol at night (1-2 of the regular  strength), no alcohol. Still gets occasional abdominal discomfort, right in the center. Has had some intermittent constipation and diarrhea.  Has not talked to Dr. Elizalde about this.      Recent Diagnostic Testing:  ZioPatch x 2 weeks 1/2020 showed SR (79 bpm, range  bpm) and minimal ectopy <1%. Intermittent Mobitz I noted (1/15 @ 0718; 1/21 @1114), both with sleeping    Stress Echocardiogam 1/2020 was normal  Echocardiogram 4/25/2019 EF 55% with mildly decreased right ventricular systolic function.  Mild aortic stenosis with a mean gradient of 9 mmHg and an aortic valve area 1.7 cm .  1+ mitral regurgitation was noted.  Left atrium was dilated at 4.6 cm with a volume index of 51.8 mL/m .  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      Assessment & Plan:    1. Persistent AFib    As above, last DCCV 5/2019. He remained in SR during hospitalization 1/2020 and ZioPatch x 2 weeks 1/2020 showed no AFib     Remains on AC with 5 for CHADSVASc (HFpEF, HTN, DM, CAD, age)  Has been OFF amiodarone since 11/2019 at Dr. Jarvis's recommendation given his elevated LFTs.      PLAN:    Continue to monitor. Would expect him to have recurrent AFib, especially as has been off of amiodarone since 11/2019    Dr. Jarvis has noted he could proceed with PVI if needed      2. CAD with recent hospitalization    Details above. Stress echo 1/2020 was normal    Trops negative after discomfort lasted x 5 days    PLAN:    Encouraged him to call instead of sending [employer] message if he has recurrent sxs    Continue current medications except pravastatin hold (see below)    3. HFpEF    Echo 4/2019 with EF 55% and mildly reduced RVSF. Mild aortic stenosis and mild MR    Stress echo 1/2020 with normal EF    Euvolemic on exam    PLAN:    Continue to limit sodium in diet and Lasix 20 mg daily    4. Benign Essential HTN    No med changes made in the hospital    PLAN:    Continue lisinopril 30      5. Elevated transaminases    In 11/2019, noted significant  increase in ALT and AST, similar to what he had at time of tarah 3/2019. He did c/o mild abdominal discomfort and diarrhea at that time     Amiodarone stopped 11/2019     Minimal Tylenol use (~325 mg daily). No alcohol.    Component       ALT AST   Latest Ref Rng & Units       0 - 70 U/L 0 - 45 U/L   8/13/2018       21 18   3/13/2019       271 (H) 249 (H)   3/15/2019       150 (H) 71 (H)   3/16/2019      9:38  (H) 102 (H)   3/16/2019      12:44  (H) 106 (H)   3/17/2019       171 (H) 86 (H)   3/18/2019       162 (H) 62 (H)   3/22/2019       91 (H) 32   3/29/2019       67 33   4/19/2019       46 25   4/25/2019       45 20   9/30/2019       39 22   11/19/2019       137 (H) 63 (H)   12/20/2019       74 (H) 38   12/30/2019       86 (H) 45   1/8/2020       74 (H) 42     PLAN:    HOLD pravastatin 80 mg daily    Repeat CMP in 1 month to assess AST, ALT      Joleen Marrero PA-C, MSPAS      Orders Placed This Encounter   Procedures     Comprehensive metabolic panel     No orders of the defined types were placed in this encounter.    Medications Discontinued During This Encounter   Medication Reason     pravastatin (PRAVACHOL) 80 MG tablet          Encounter Diagnosis   Name Primary?     Elevated liver enzymes        CURRENT MEDICATIONS:  Current Outpatient Medications   Medication Sig Dispense Refill     apixaban ANTICOAGULANT (ELIQUIS) 5 MG tablet Take 1 tablet (5 mg) by mouth 2 times daily 180 tablet 2     azelastine (ASTELIN) 0.1 % nasal spray Spray 2 sprays into both nostrils 2 times daily 1 Bottle 11     Cholecalciferol (VITAMIN D3 PO) Take 5,000 Units by mouth daily       famotidine (PEPCID) 20 MG tablet Take 2 tablets (40 mg) by mouth 2 times daily 180 tablet 3     furosemide (LASIX) 20 MG tablet TAKE ONE TABLET BY MOUTH ONCE DAILY 30 tablet 9     glucose 4 g CHEW Take 1 tablet by mouth every hour as needed for low blood sugar       insulin aspart (NOVOLOG FLEXPEN) 100 UNIT/ML pen Inject 3 times daily (before  meals). 3 units per carb choice (15gm). Approx 10-15 units/meal. 45 mL 3     insulin detemir (LEVEMIR FLEXPEN/FLEXTOUCH) 100 UNIT/ML pen Inject 40 Units Subcutaneous At Bedtime 45 mL 3     levothyroxine (SYNTHROID/LEVOTHROID) 125 MCG tablet Take 1 tablet (125 mcg) by mouth daily 90 tablet 1     lisinopril (PRINIVIL/ZESTRIL) 30 MG tablet Take 1 tablet (30 mg) by mouth daily 90 tablet 3     nitroglycerin (NITROSTAT) 0.4 MG SL tablet Place 1 tablet (0.4 mg) under the tongue every 5 minutes as needed for chest pain 25 tablet 1     NOVOFINE 30 30G X 8 MM insulin pen needle USE FOUR TIMES DAILY. PATIENT NEEDS OFFICE VISIT 400 each 1       ALLERGIES     Allergies   Allergen Reactions     Omeprazole      diarrhea       PAST MEDICAL HISTORY:  Past Medical History:   Diagnosis Date     Adhesive capsulitis of shoulder      Anemia 3/10/2014     Anemia, unspecified      Antiplatelet or antithrombotic long-term use      Arrhythmia     Atrial fib/flutter     CAD (coronary artery disease)      History of cardioversion 04/24/2018    a single synchronized shock of 120 joules restored normal sinus rhythm     History of cardioversion 02/13/2019    single shock , 200 joules successul in restoring NSR     Hyperlipidemia LDL goal <70 5/26/2011     Hypertension      Hypothyroidism      Impotence of organic origin      Laceration of finger  June 2010     left thumb s/p sutures     Numbness and tingling     diabetic neuropathy bilateral feet     BRANDON (obstructive sleep apnea)     intolerant of CPAP, uses it occasionally.  Using mandibular device occasionally     Osteopenia      Pulmonary hypertension (H) 4/6/2012     Sensorineural hearing loss, unspecified      Stented coronary artery 1997    CABG      Testosterone deficiency      Type 2 diabetes mellitus without complication  (goal A1C<8) 10/24/2015     Typical atrial flutter (H) 4/18/16     Unspecified hypothyroidism      Vitamin D deficiency 9/3/2011       PAST SURGICAL HISTORY:  Past  Surgical History:   Procedure Laterality Date     ANESTHESIA CARDIOVERSION N/A 2/13/2019    Procedure: ANESTHESIA CARDIOVERSION;  Surgeon: GENERIC ANESTHESIA PROVIDER;  Location:  OR     ANESTHESIA CARDIOVERSION N/A 5/22/2019    Procedure: ANESTHESIA, FOR CARDIOVERSION;  Surgeon: GENERIC ANESTHESIA PROVIDER;  Location: [facility] OR     C NONSPECIFIC PROCEDURE  1995    [facility]G ([facility])     C NONSPECIFIC PROCEDURE  8/01    stress echo     CARDIAC SURGERY      bypass in 1997     CARDIOVERSION  04/24/2018     COLONOSCOPY       CORONARY ANGIOGRAPHY ADULT ORDER      4/2/2012     CORONARY ARTERY BYPASS  1997    [facility]     ENDOSCOPIC ULTRASOUND UPPER GASTROINTESTINAL TRACT (GI) N/A 3/14/2019    Procedure: ENDOSCOPIC ULTRASOUND OF UPPER GASTROINTESTINAL TRACT;  Surgeon: Ju Torres MD;  Location:  OR     EXCISE MASS HEAD Left 8/18/2016    Procedure: EXCISE MASS HEAD;  Surgeon: Ivan Hernandez MD;  Location: [facility]     HEART CATH, ANGIOPLASTY  4/2012     LAPAROSCOPIC CHOLECYSTECTOMY N/A 3/17/2019    Procedure: LAPAROSCOPIC CHOLECYSTECTOMY;  Surgeon: Janel Paz MD;  Location:  OR     PHACOEMULSIFICATION CLEAR CORNEA WITH STANDARD INTRAOCULAR LENS IMPLANT Left 6/8/2015    Procedure: PHACOEMULSIFICATION CLEAR CORNEA WITH STANDARD INTRAOCULAR LENS IMPLANT;  Surgeon: Nixon Farnsworth MD;  Location:  [facility]     PHACOEMULSIFICATION CLEAR CORNEA WITH STANDARD INTRAOCULAR LENS IMPLANT Right 6/22/2015    Procedure: PHACOEMULSIFICATION CLEAR CORNEA WITH STANDARD INTRAOCULAR LENS IMPLANT;  Surgeon: Nixon Farnsworth MD;  Location:  [facility]     SEPTOPLASTY, TURBINOPLASTY, COMBINED Bilateral 8/18/2016    Procedure: COMBINED SEPTOPLASTY, TURBINOPLASTY;  Surgeon: Ivan Hernandez MD;  Location: [facility]       FAMILY HISTORY:  Family History   Problem Relation Age of Onset     Genitourinary Problems Father         d: age 89; ARF     Hypertension Father      Diabetes Mother         d: age 68,  CAD     Heart Disease Mother         MI     Myocardial Infarction Mother      Cardiovascular Brother         d:  age 31; CAD     Heart Disease Brother         age 31, MI     Diabetes Sister         b:  1939; DM2       SOCIAL HISTORY:  Social History     Socioeconomic History     Marital status:      Spouse name: None     Number of children: None     Years of education: None     Highest education level: None   Occupational History     None   Social Needs     Financial resource strain: None     Food insecurity:     Worry: None     Inability: None     Transportation needs:     Medical: None     Non-medical: None   Tobacco Use     Smoking status: Former Smoker     Packs/day: 1.00     Years: 6.00     Pack years: 6.00     Types: Cigarettes     Start date:      Last attempt to quit: 1964     Years since quittin.6     Smokeless tobacco: Never Used   Substance and Sexual Activity     Alcohol use: Yes     Comment: couple drinks a year     Drug use: No     Sexual activity: Yes     Partners: Female   Lifestyle     Physical activity:     Days per week: None     Minutes per session: None     Stress: None   Relationships     Social connections:     Talks on phone: None     Gets together: None     Attends [employer] service: None     Active member of club or organization: None     Attends meetings of clubs or organizations: None     Relationship status: None     Intimate partner violence:     Fear of current or ex partner: None     Emotionally abused: None     Physically abused: None     Forced sexual activity: None   Other Topics Concern     Parent/sibling w/ CABG, MI or angioplasty before 65F 55M? Not Asked      Service Not Asked     Blood Transfusions Not Asked     Caffeine Concern Yes     Comment: 2 cups coffee a day     Occupational Exposure Not Asked     Hobby Hazards Not Asked     Sleep Concern Yes     Comment: sleep apnea, wears cpap off and on     Stress Concern No     Weight Concern No      "Special Diet Yes     Comment: diabetic diet      Back Care Not Asked     Exercise No     Comment: occ walk the mall     Bike Helmet Not Asked     Seat Belt Yes     Self-Exams Not Asked   Social History Narrative     None       Review of Systems:  Skin:  Negative     Eyes:  Positive for glasses  ENT:  Positive for hearing loss  Respiratory:  Negative for dyspnea on exertion;shortness of breath  Cardiovascular:  Negative for;palpitations;chest pain;dizziness;lightheadedness;fatigue    Gastroenterology: Positive for heartburn  Genitourinary:  Negative    Musculoskeletal:  Positive for arthritis;joint pain  Neurologic:  Positive for numbness or tingling of feet  Psychiatric:  Negative    Heme/Lymph/Imm:  Negative    Endocrine:  Positive for thyroid disorder;diabetes    Physical Exam:  Vitals: /75   Pulse 88   Ht 1.803 m (5' 11\")   Wt 89.4 kg (197 lb)   BMI 27.48 kg/m      Constitutional:  cooperative, alert and oriented, well developed, well nourished, in no acute distress        Skin:  warm and dry to the touch, no apparent skin lesions or masses noted        Head:  normocephalic, no masses or lesions        Eyes:  pupils equal and round;conjunctivae and lids unremarkable;sclera white        ENT:  no pallor or cyanosis, dentition good        Neck:  JVP normal;no carotid bruit        Chest:  normal breath sounds, clear to auscultation, normal A-P diameter, normal symmetry, normal respiratory excursion, no use of accessory muscles        Cardiac: regular rhythm, normal S1/S2, no S3 or S4, apical impulse not displaced, no murmurs, gallops or rubs                  Abdomen:  abdomen soft obese      Vascular: pulses full and equal                                      Extremities and Back:  no deformities, clubbing, cyanosis, erythema observed;no edema        Neurological:  no gross motor deficits        Recent Lab Results:  LIPID RESULTS:  Lab Results   Component Value Date    CHOL 152 09/30/2019    HDL 35 (L) " 09/30/2019    LDL 45 09/30/2019    TRIG 360 (H) 09/30/2019    CHOLHDLRATIO 3.3 05/08/2015       LIVER ENZYME RESULTS:  Lab Results   Component Value Date    AST 42 01/08/2020    ALT 74 (H) 01/08/2020       CBC RESULTS:  Lab Results   Component Value Date    WBC 4.6 01/08/2020    RBC 4.31 (L) 01/08/2020    HGB 13.0 (L) 01/08/2020    HCT 37.4 (L) 01/08/2020    MCV 87 01/08/2020    MCH 30.2 01/08/2020    MCHC 34.8 01/08/2020    RDW 13.6 01/08/2020     01/08/2020       BMP RESULTS:  Lab Results   Component Value Date     01/09/2020    POTASSIUM 4.2 01/09/2020    CHLORIDE 109 01/09/2020    CO2 31 01/09/2020    ANIONGAP 1 (L) 01/09/2020     (H) 01/09/2020    BUN 17 01/09/2020    CR 1.09 01/09/2020    GFRESTIMATED 64 01/09/2020    GFRESTBLACK 75 01/09/2020    STARR 9.6 01/09/2020

## 2020-02-17 ENCOUNTER — DOCUMENTATION ONLY (OUTPATIENT)
Dept: CARDIOLOGY | Facility: CLINIC | Age: 80
End: 2020-02-17

## 2020-02-17 ENCOUNTER — OFFICE VISIT (OUTPATIENT)
Dept: CARDIOLOGY | Facility: CLINIC | Age: 80
End: 2020-02-17
Payer: COMMERCIAL

## 2020-02-17 VITALS
DIASTOLIC BLOOD PRESSURE: 75 MMHG | HEART RATE: 88 BPM | HEIGHT: 71 IN | BODY MASS INDEX: 27.58 KG/M2 | WEIGHT: 197 LBS | SYSTOLIC BLOOD PRESSURE: 126 MMHG

## 2020-02-17 DIAGNOSIS — R74.8 ELEVATED LIVER ENZYMES: ICD-10-CM

## 2020-02-17 PROCEDURE — 99214 OFFICE O/P EST MOD 30 MIN: CPT | Performed by: PHYSICIAN ASSISTANT

## 2020-02-17 ASSESSMENT — MIFFLIN-ST. JEOR: SCORE: 1635.72

## 2020-02-17 NOTE — PATIENT INSTRUCTIONS
1. You have stayed in normal rhythm despite stopping amiodarone 11/2019.  Your EKG in the hospital and your ZioPatch afterwards looked good without any AFib.     2. Discussed your liver tests. They were normal after your gall bladder came out, but then got worse again in 11/2019. Dr. nath stopped the amiodarone but you still have abnl numbers.     HOLD pravastatin (choleterol med) 80 mg x 1 month   In 1 month, get repeat non-fasting blood work and we'll see if liver test has normalized.   We'll call with results of blood work    3. My nurses are Balwinder Chin: [phone]

## 2020-02-17 NOTE — LETTER
2/17/2020    Andrew Elizalde MD  [address] [facility] [address]    RE: Pascual Addisonocki       Dear Colleague,    I had the pleasure of seeing Pascual Perea in the [facility].    HPI:   I had the pleasure of seeing Dayday when he came for follow up of atrial fibrillation.  He is a 78 year old who sees Dr. Jarvis and recently met with Dr. Adams for his history of:     1. Persistent AFib first diagnosed 5/2016, s/p DCCV 2016 and again 4/2018. Started on Amiodarone and underwent DCCV 2/2019 and 5/2019 (after cholecystectomy triggered AFib 3/2019). Dr. Jarvis noted we could proceed with PVI vs DCCV.  Amiodarone stopped 11/2019 due to abnl LFTs.  2. Benign essential hypertension  3. HFpEF, with fluid overload and ascites. EF 55% with mildly decreased RVSF. Low dose Lasix working well  4. CAD s/p CABG 1997. Stress echo 1/2020 during hospitalization 1/2020 was normal.       Dr. Jarvis saw Dayday 11/2019 at which time he noted mild increase in his transaminases. Amiodarone was stopped and routine follow-up was recommended.    He then contacted our office 1/7 via [facility] with c/o recurrent AFib, as well as shoulder aching and chest discomfort that had all started 1/3. He was recommended to go the ER and was admitted 1/8-1/9/2020 for this. Troponins negative, EKG in SR. Stress echo 1/9/2020 was negative. No cardiac medications were adjusted. He was discharged on ZioPatch    He then met with Dr. Adams in follow-up, who recommended he keep EP follow-up to review the ZioPatch    Interval History:  Notes that due to Kina's dementia, she's now residing in a NH/Memory Care.     Explains that he had attempted moving his big entertainment center in 1/2020, which prompted the shoulder and chest discomfort. No discomfort since and pleased to see that the stress test looked so good.    No palpitations or recurrent AFib. Pleased to see ZioPatch showed no significant arrhythmias. Both of the Wenckebach episodes  occurred with sleep (he was napping after [employer] on Tuesday 1/21).     Discussed his elevated AST, ALT noted 11/2019. Amiodarone was stopped. Now ALT remains a bit high.  He does use Tylenol at night (1-2 of the regular strength), no alcohol. Still gets occasional abdominal discomfort, right in the center. Has had some intermittent constipation and diarrhea.  Has not talked to Dr. Elizalde about this.      Recent Diagnostic Testing:  ZioPatch x 2 weeks 1/2020 showed SR (79 bpm, range  bpm) and minimal ectopy <1%. Intermittent Mobitz I noted (1/15 @ 0718; 1/21 @1114), both with sleeping    Stress Echocardiogam 1/2020 was normal  Echocardiogram 4/25/2019 EF 55% with mildly decreased right ventricular systolic function.  Mild aortic stenosis with a mean gradient of 9 mmHg and an aortic valve area 1.7 cm .  1+ mitral regurgitation was noted.  Left atrium was dilated at 4.6 cm with a volume index of 51.8 mL/m .  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      Assessment & Plan:    1. Persistent AFib    As above, last DCCV 5/2019. He remained in SR during hospitalization 1/2020 and ZioPatch x 2 weeks 1/2020 showed no AFib     Remains on AC with 5 for CHADSVASc (HFpEF, HTN, DM, CAD, age)  Has been OFF amiodarone since 11/2019 at Dr. Jarvis's recommendation given his elevated LFTs.      PLAN:    Continue to monitor. Would expect him to have recurrent AFib, especially as has been off of amiodarone since 11/2019    Dr. Jarvis has noted he could proceed with PVI if needed      2. CAD with recent hospitalization    Details above. Stress echo 1/2020 was normal    Trops negative after discomfort lasted x 5 days    PLAN:    Encouraged him to call instead of sending marinanowhart message if he has recurrent sxs    Continue current medications except pravastatin hold (see below)    3. HFpEF    Echo 4/2019 with EF 55% and mildly reduced RVSF. Mild aortic stenosis and mild MR    Stress echo 1/2020 with normal EF    Euvolemic on  exam    PLAN:    Continue to limit sodium in diet and Lasix 20 mg daily    4. Benign Essential HTN    No med changes made in the hospital    PLAN:    Continue lisinopril 30      5. Elevated transaminases    In 11/2019, noted significant increase in ALT and AST, similar to what he had at time of tarah 3/2019. He did c/o mild abdominal discomfort and diarrhea at that time     Amiodarone stopped 11/2019     Minimal Tylenol use (~325 mg daily). No alcohol.    Component       ALT AST   Latest Ref Rng & Units       0 - 70 U/L 0 - 45 U/L   8/13/2018       21 18   3/13/2019       271 (H) 249 (H)   3/15/2019       150 (H) 71 (H)   3/16/2019      9:38  (H) 102 (H)   3/16/2019      12:44  (H) 106 (H)   3/17/2019       171 (H) 86 (H)   3/18/2019       162 (H) 62 (H)   3/22/2019       91 (H) 32   3/29/2019       67 33   4/19/2019       46 25   4/25/2019       45 20   9/30/2019       39 22   11/19/2019       137 (H) 63 (H)   12/20/2019       74 (H) 38   12/30/2019       86 (H) 45   1/8/2020       74 (H) 42     PLAN:    HOLD pravastatin 80 mg daily    Repeat CMP in 1 month to assess AST, ALT      Joleen Marrero PA-C, MSPAS      Orders Placed This Encounter   Procedures     Comprehensive metabolic panel     No orders of the defined types were placed in this encounter.    Medications Discontinued During This Encounter   Medication Reason     pravastatin (PRAVACHOL) 80 MG tablet          Encounter Diagnosis   Name Primary?     Elevated liver enzymes        CURRENT MEDICATIONS:  Current Outpatient Medications   Medication Sig Dispense Refill     apixaban ANTICOAGULANT (ELIQUIS) 5 MG tablet Take 1 tablet (5 mg) by mouth 2 times daily 180 tablet 2     azelastine (ASTELIN) 0.1 % nasal spray Spray 2 sprays into both nostrils 2 times daily 1 Bottle 11     Cholecalciferol (VITAMIN D3 PO) Take 5,000 Units by mouth daily       famotidine (PEPCID) 20 MG tablet Take 2 tablets (40 mg) by mouth 2 times daily 180 tablet 3     furosemide  (LASIX) 20 MG tablet TAKE ONE TABLET BY MOUTH ONCE DAILY 30 tablet 9     glucose 4 g CHEW Take 1 tablet by mouth every hour as needed for low blood sugar       insulin aspart (NOVOLOG FLEXPEN) 100 UNIT/ML pen Inject 3 times daily (before meals). 3 units per carb choice (15gm). Approx 10-15 units/meal. 45 mL 3     insulin detemir (LEVEMIR FLEXPEN/FLEXTOUCH) 100 UNIT/ML pen Inject 40 Units Subcutaneous At Bedtime 45 mL 3     levothyroxine (SYNTHROID/LEVOTHROID) 125 MCG tablet Take 1 tablet (125 mcg) by mouth daily 90 tablet 1     lisinopril (PRINIVIL/ZESTRIL) 30 MG tablet Take 1 tablet (30 mg) by mouth daily 90 tablet 3     nitroglycerin (NITROSTAT) 0.4 MG SL tablet Place 1 tablet (0.4 mg) under the tongue every 5 minutes as needed for chest pain 25 tablet 1     NOVOFINE 30 30G X 8 MM insulin pen needle USE FOUR TIMES DAILY. PATIENT NEEDS OFFICE VISIT 400 each 1       ALLERGIES     Allergies   Allergen Reactions     Omeprazole      diarrhea       PAST MEDICAL HISTORY:  Past Medical History:   Diagnosis Date     Adhesive capsulitis of shoulder      Anemia 3/10/2014     Anemia, unspecified      Antiplatelet or antithrombotic long-term use      Arrhythmia     Atrial fib/flutter     CAD (coronary artery disease)      History of cardioversion 04/24/2018    a single synchronized shock of 120 joules restored normal sinus rhythm     History of cardioversion 02/13/2019    single shock , 200 joules successul in restoring NSR     Hyperlipidemia LDL goal <70 5/26/2011     Hypertension      Hypothyroidism      Impotence of organic origin      Laceration of finger  June 2010     left thumb s/p sutures     Numbness and tingling     diabetic neuropathy bilateral feet     BRANDON (obstructive sleep apnea)     intolerant of CPAP, uses it occasionally.  Using mandibular device occasionally     Osteopenia      Pulmonary hypertension (H) 4/6/2012     Sensorineural hearing loss, unspecified      Stented coronary artery 1997    CABG       Testosterone deficiency      Type 2 diabetes mellitus without complication  (goal A1C<8) 10/24/2015     Typical atrial flutter (H) 4/18/16     Unspecified hypothyroidism      Vitamin D deficiency 9/3/2011       PAST SURGICAL HISTORY:  Past Surgical History:   Procedure Laterality Date     ANESTHESIA CARDIOVERSION N/A 2/13/2019    Procedure: ANESTHESIA CARDIOVERSION;  Surgeon: GENERIC ANESTHESIA PROVIDER;  Location:  OR     ANESTHESIA CARDIOVERSION N/A 5/22/2019    Procedure: ANESTHESIA, FOR CARDIOVERSION;  Surgeon: GENERIC ANESTHESIA PROVIDER;  Location: [facility] OR     C NONSPECIFIC PROCEDURE  1995    CABG ([facility])     C NONSPECIFIC PROCEDURE  8/01    stress echo     CARDIAC SURGERY      bypass in 1997     CARDIOVERSION  04/24/2018     COLONOSCOPY       CORONARY ANGIOGRAPHY ADULT ORDER      4/2/2012     CORONARY ARTERY BYPASS  1997    [facility]     ENDOSCOPIC ULTRASOUND UPPER GASTROINTESTINAL TRACT (GI) N/A 3/14/2019    Procedure: ENDOSCOPIC ULTRASOUND OF UPPER GASTROINTESTINAL TRACT;  Surgeon: Ju Torres MD;  Location:  OR     EXCISE MASS HEAD Left 8/18/2016    Procedure: EXCISE MASS HEAD;  Surgeon: Ivan Hernandez MD;  Location:  [facility]     HEART CATH, ANGIOPLASTY  4/2012     LAPAROSCOPIC CHOLECYSTECTOMY N/A 3/17/2019    Procedure: LAPAROSCOPIC CHOLECYSTECTOMY;  Surgeon: Janel Paz MD;  Location:  OR     PHACOEMULSIFICATION CLEAR CORNEA WITH STANDARD INTRAOCULAR LENS IMPLANT Left 6/8/2015    Procedure: PHACOEMULSIFICATION CLEAR CORNEA WITH STANDARD INTRAOCULAR LENS IMPLANT;  Surgeon: Nixon Farnsworth MD;  Location:  [facility]     PHACOEMULSIFICATION CLEAR CORNEA WITH STANDARD INTRAOCULAR LENS IMPLANT Right 6/22/2015    Procedure: PHACOEMULSIFICATION CLEAR CORNEA WITH STANDARD INTRAOCULAR LENS IMPLANT;  Surgeon: Nixon Farnsworth MD;  Location:  [facility]     SEPTOPLASTY, TURBINOPLASTY, COMBINED Bilateral 8/18/2016    Procedure: COMBINED SEPTOPLASTY, TURBINOPLASTY;   Surgeon: Ivan Hernandez MD;  Location: [facility]       FAMILY HISTORY:  Family History   Problem Relation Age of Onset     Genitourinary Problems Father         d: age 89; ARF     Hypertension Father      Diabetes Mother         d: age 68, CAD     Heart Disease Mother         MI     Myocardial Infarction Mother      Cardiovascular Brother         d:  age 31; CAD     Heart Disease Brother         age 31, MI     Diabetes Sister         b:  1939; DM2       SOCIAL HISTORY:  Social History     Socioeconomic History     Marital status:      Spouse name: None     Number of children: None     Years of education: None     Highest education level: None   Occupational History     None   Social Needs     Financial resource strain: None     Food insecurity:     Worry: None     Inability: None     Transportation needs:     Medical: None     Non-medical: None   Tobacco Use     Smoking status: Former Smoker     Packs/day: 1.00     Years: 6.00     Pack years: 6.00     Types: Cigarettes     Start date:      Last attempt to quit: 1964     Years since quittin.6     Smokeless tobacco: Never Used   Substance and Sexual Activity     Alcohol use: Yes     Comment: couple drinks a year     Drug use: No     Sexual activity: Yes     Partners: Female   Lifestyle     Physical activity:     Days per week: None     Minutes per session: None     Stress: None   Relationships     Social connections:     Talks on phone: None     Gets together: None     Attends Anabaptist service: None     Active member of club or organization: None     Attends meetings of clubs or organizations: None     Relationship status: None     Intimate partner violence:     Fear of current or ex partner: None     Emotionally abused: None     Physically abused: None     Forced sexual activity: None   Other Topics Concern     Parent/sibling w/ CABG, MI or angioplasty before 65F 55M? Not Asked      Service Not Asked     Blood Transfusions Not Asked      "Caffeine Concern Yes     Comment: 2 cups coffee a day     Occupational Exposure Not Asked     Hobby Hazards Not Asked     Sleep Concern Yes     Comment: sleep apnea, wears cpap off and on     Stress Concern No     Weight Concern No     Special Diet Yes     Comment: diabetic diet      Back Care Not Asked     Exercise No     Comment: occ walk the mall     Bike Helmet Not Asked     Seat Belt Yes     Self-Exams Not Asked   Social History Narrative     None       Review of Systems:  Skin:  Negative     Eyes:  Positive for glasses  ENT:  Positive for hearing loss  Respiratory:  Negative for dyspnea on exertion;shortness of breath  Cardiovascular:  Negative for;palpitations;chest pain;dizziness;lightheadedness;fatigue    Gastroenterology: Positive for heartburn  Genitourinary:  Negative    Musculoskeletal:  Positive for arthritis;joint pain  Neurologic:  Positive for numbness or tingling of feet  Psychiatric:  Negative    Heme/Lymph/Imm:  Negative    Endocrine:  Positive for thyroid disorder;diabetes    Physical Exam:  Vitals: /75   Pulse 88   Ht 1.803 m (5' 11\")   Wt 89.4 kg (197 lb)   BMI 27.48 kg/m       Constitutional:  cooperative, alert and oriented, well developed, well nourished, in no acute distress        Skin:  warm and dry to the touch, no apparent skin lesions or masses noted        Head:  normocephalic, no masses or lesions        Eyes:  pupils equal and round;conjunctivae and lids unremarkable;sclera white        ENT:  no pallor or cyanosis, dentition good        Neck:  JVP normal;no carotid bruit        Chest:  normal breath sounds, clear to auscultation, normal A-P diameter, normal symmetry, normal respiratory excursion, no use of accessory muscles        Cardiac: regular rhythm, normal S1/S2, no S3 or S4, apical impulse not displaced, no murmurs, gallops or rubs                  Abdomen:  abdomen soft obese      Vascular: pulses full and equal                                      Extremities and " Back:  no deformities, clubbing, cyanosis, erythema observed;no edema        Neurological:  no gross motor deficits        Recent Lab Results:  LIPID RESULTS:  Lab Results   Component Value Date    CHOL 152 09/30/2019    HDL 35 (L) 09/30/2019    LDL 45 09/30/2019    TRIG 360 (H) 09/30/2019    CHOLHDLRATIO 3.3 05/08/2015       LIVER ENZYME RESULTS:  Lab Results   Component Value Date    AST 42 01/08/2020    ALT 74 (H) 01/08/2020       CBC RESULTS:  Lab Results   Component Value Date    WBC 4.6 01/08/2020    RBC 4.31 (L) 01/08/2020    HGB 13.0 (L) 01/08/2020    HCT 37.4 (L) 01/08/2020    MCV 87 01/08/2020    MCH 30.2 01/08/2020    MCHC 34.8 01/08/2020    RDW 13.6 01/08/2020     01/08/2020       BMP RESULTS:  Lab Results   Component Value Date     01/09/2020    POTASSIUM 4.2 01/09/2020    CHLORIDE 109 01/09/2020    CO2 31 01/09/2020    ANIONGAP 1 (L) 01/09/2020     (H) 01/09/2020    BUN 17 01/09/2020    CR 1.09 01/09/2020    GFRESTIMATED 64 01/09/2020    GFRESTBLACK 75 01/09/2020    STARR 9.6 01/09/2020          Thank you for allowing me to participate in the care of your patient.    Sincerely,     Rebekah Marrero PA-C     [facility]

## 2020-02-17 NOTE — PROGRESS NOTES
Dr. Simona SINGH. ALT remains high.    You saw Dayday 11/2019 and stopped his Amio for AFib as his LFTs had bumped, similar to what they were when he was in for acute tarah 3/2019.    ALT remains high despite stopping amiodarone. No EtOH (and AST is fine). Minimal Tylenol use...    No AFib despite stopping Amio    **  I HELD pravastatin 80 mg daily per your last note. Repeat CMP in 1 month.    Component       ALT AST   Latest Ref Rng & Units       0 - 70 U/L 0 - 45 U/L   8/13/2018       21 18   3/13/2019       271 (H) 249 (H)   3/15/2019       150 (H) 71 (H)   3/16/2019      9:38  (H) 102 (H)   3/16/2019      12:44  (H) 106 (H)   3/17/2019       171 (H) 86 (H)   3/18/2019       162 (H) 62 (H)   3/22/2019       91 (H) 32   3/29/2019       67 33   4/19/2019       46 25   4/25/2019       45 20   9/30/2019       39 22   11/19/2019       137 (H) 63 (H)   12/20/2019       74 (H) 38   12/30/2019       86 (H) 45   1/8/2020       74 (H) 42

## 2020-02-18 ENCOUNTER — HOSPITAL ENCOUNTER (EMERGENCY)
Facility: CLINIC | Age: 80
Discharge: HOME | End: 2020-02-18
Attending: EMERGENCY MEDICINE | Admitting: EMERGENCY MEDICINE
Payer: COMMERCIAL

## 2020-02-18 VITALS
HEIGHT: 71 IN | SYSTOLIC BLOOD PRESSURE: 130 MMHG | TEMPERATURE: 98.2 F | BODY MASS INDEX: 27.48 KG/M2 | DIASTOLIC BLOOD PRESSURE: 65 MMHG | HEART RATE: 65 BPM | RESPIRATION RATE: 18 BRPM | OXYGEN SATURATION: 98 %

## 2020-02-18 DIAGNOSIS — R55 VASOVAGAL NEAR SYNCOPE: ICD-10-CM

## 2020-02-18 DIAGNOSIS — E86.0 DEHYDRATION: ICD-10-CM

## 2020-02-18 LAB
ALBUMIN UR-MCNC: NEGATIVE MG/DL
ANION GAP SERPL CALCULATED.3IONS-SCNC: 1 MMOL/L (ref 3–14)
APPEARANCE UR: CLEAR
BASOPHILS # BLD AUTO: 0.1 10E9/L (ref 0–0.2)
BASOPHILS NFR BLD AUTO: 1.7 %
BILIRUB UR QL STRIP: NEGATIVE
BUN SERPL-MCNC: 19 MG/DL (ref 7–30)
CALCIUM SERPL-MCNC: 10 MG/DL (ref 8.5–10.1)
CHLORIDE SERPL-SCNC: 112 MMOL/L (ref 94–109)
CO2 SERPL-SCNC: 28 MMOL/L (ref 20–32)
COLOR UR AUTO: NORMAL
CREAT SERPL-MCNC: 1.12 MG/DL (ref 0.66–1.25)
DIFFERENTIAL METHOD BLD: ABNORMAL
EOSINOPHIL # BLD AUTO: 0.1 10E9/L (ref 0–0.7)
EOSINOPHIL NFR BLD AUTO: 2.9 %
ERYTHROCYTE [DISTWIDTH] IN BLOOD BY AUTOMATED COUNT: 13.8 % (ref 10–15)
GFR SERPL CREATININE-BSD FRML MDRD: 62 ML/MIN/{1.73_M2}
GLUCOSE SERPL-MCNC: 139 MG/DL (ref 70–99)
GLUCOSE UR STRIP-MCNC: NEGATIVE MG/DL
HCT VFR BLD AUTO: 34.6 % (ref 40–53)
HGB BLD-MCNC: 12.1 G/DL (ref 13.3–17.7)
HGB UR QL STRIP: NEGATIVE
IMM GRANULOCYTES # BLD: 0 10E9/L (ref 0–0.4)
IMM GRANULOCYTES NFR BLD: 0.3 %
INTERPRETATION ECG - MUSE: NORMAL
KETONES UR STRIP-MCNC: NEGATIVE MG/DL
LEUKOCYTE ESTERASE UR QL STRIP: NEGATIVE
LYMPHOCYTES # BLD AUTO: 0.8 10E9/L (ref 0.8–5.3)
LYMPHOCYTES NFR BLD AUTO: 24.4 %
MCH RBC QN AUTO: 30 PG (ref 26.5–33)
MCHC RBC AUTO-ENTMCNC: 35 G/DL (ref 31.5–36.5)
MCV RBC AUTO: 86 FL (ref 78–100)
MONOCYTES # BLD AUTO: 0.3 10E9/L (ref 0–1.3)
MONOCYTES NFR BLD AUTO: 9 %
NEUTROPHILS # BLD AUTO: 2.1 10E9/L (ref 1.6–8.3)
NEUTROPHILS NFR BLD AUTO: 61.7 %
NITRATE UR QL: NEGATIVE
NRBC # BLD AUTO: 0 10*3/UL
NRBC BLD AUTO-RTO: 0 /100
PH UR STRIP: 5 PH (ref 5–7)
PLATELET # BLD AUTO: 124 10E9/L (ref 150–450)
POTASSIUM SERPL-SCNC: 4 MMOL/L (ref 3.4–5.3)
RBC # BLD AUTO: 4.03 10E12/L (ref 4.4–5.9)
SODIUM SERPL-SCNC: 141 MMOL/L (ref 133–144)
SOURCE: NORMAL
SP GR UR STRIP: 1.01 (ref 1–1.03)
TROPONIN I SERPL-MCNC: <0.015 UG/L (ref 0–0.04)
UROBILINOGEN UR STRIP-MCNC: NORMAL MG/DL (ref 0–2)
WBC # BLD AUTO: 3.4 10E9/L (ref 4–11)

## 2020-02-18 PROCEDURE — 81003 URINALYSIS AUTO W/O SCOPE: CPT | Performed by: EMERGENCY MEDICINE

## 2020-02-18 PROCEDURE — 96360 HYDRATION IV INFUSION INIT: CPT

## 2020-02-18 PROCEDURE — 84484 ASSAY OF TROPONIN QUANT: CPT | Performed by: EMERGENCY MEDICINE

## 2020-02-18 PROCEDURE — 93005 ELECTROCARDIOGRAM TRACING: CPT

## 2020-02-18 PROCEDURE — 99284 EMERGENCY DEPT VISIT MOD MDM: CPT | Mod: 25

## 2020-02-18 PROCEDURE — 85025 COMPLETE CBC W/AUTO DIFF WBC: CPT | Performed by: EMERGENCY MEDICINE

## 2020-02-18 PROCEDURE — 80048 BASIC METABOLIC PNL TOTAL CA: CPT | Performed by: EMERGENCY MEDICINE

## 2020-02-18 PROCEDURE — 96361 HYDRATE IV INFUSION ADD-ON: CPT

## 2020-02-18 PROCEDURE — 25800030 ZZH RX IP 258 OP 636: Performed by: EMERGENCY MEDICINE

## 2020-02-18 RX ADMIN — SODIUM CHLORIDE 1000 ML: 9 INJECTION, SOLUTION INTRAVENOUS at 09:27

## 2020-02-18 ASSESSMENT — ENCOUNTER SYMPTOMS
NECK PAIN: 0
NAUSEA: 0
SHORTNESS OF BREATH: 0
HEADACHES: 1
LIGHT-HEADEDNESS: 1
VOMITING: 0
DIARRHEA: 0

## 2020-02-18 NOTE — DISCHARGE INSTRUCTIONS
Push fluids, recheck with your doctor if you continue to have further episodes.  If you ever passed out for no reason, you need to come into the ER for further evaluation and work-up.

## 2020-02-18 NOTE — ED NOTES
Bed: ED21  Expected date: 2/18/20  Expected time: 8:56 AM  Means of arrival: Ambulance  Comments:  414 78m orthostatic ETA 0957

## 2020-02-18 NOTE — ED TRIAGE NOTES
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 Pt had no LOC or fall. Pt was sweaty at the time as well. Per EMS pt was hypotensive when standing and recovered when lying down.

## 2020-02-18 NOTE — ED AVS SNAPSHOT
Emergency Department  [address] [facility] [address]  Phone:  [phone]  Fax:  [phone]                                    Pascual Perea   MRN: [id number]    Department:   Emergency Department   Date of Visit:  2/18/2020           After Visit Summary Signature Page    I have received my discharge instructions, and my questions have been answered. I have discussed any challenges I see with this plan with the nurse or doctor.    ..........................................................................................................................................  Patient/Patient Representative Signature      ..........................................................................................................................................  Patient Representative Print Name and Relationship to Patient    ..................................................               ................................................  Date                                   Time    ..........................................................................................................................................  Reviewed by Signature/Title    ...................................................              ..............................................  Date                                               Time          22EPIC Rev 08/18

## 2020-02-25 NOTE — PROGRESS NOTES
CMP already scheduled for 3/19/2020    Satnam Jarvis MD  You 2 hours ago (10:57 AM)         At least it's not going up. Repeat full LFTs in 3 months. Thanks qp         Documentation

## 2020-03-02 ENCOUNTER — OFFICE VISIT (OUTPATIENT)
Dept: URGENT CARE | Facility: URGENT CARE | Age: 80
End: 2020-03-02
Payer: COMMERCIAL

## 2020-03-02 VITALS
BODY MASS INDEX: 27.48 KG/M2 | OXYGEN SATURATION: 100 % | SYSTOLIC BLOOD PRESSURE: 115 MMHG | RESPIRATION RATE: 18 BRPM | WEIGHT: 197 LBS | HEART RATE: 92 BPM | DIASTOLIC BLOOD PRESSURE: 57 MMHG

## 2020-03-02 DIAGNOSIS — M79.5 FOREIGN BODY (FB) IN SOFT TISSUE: ICD-10-CM

## 2020-03-02 DIAGNOSIS — L03.90 CELLULITIS, UNSPECIFIED CELLULITIS SITE: Primary | ICD-10-CM

## 2020-03-02 PROCEDURE — 99213 OFFICE O/P EST LOW 20 MIN: CPT | Mod: 25 | Performed by: PHYSICIAN ASSISTANT

## 2020-03-02 PROCEDURE — 10120 INC&RMVL FB SUBQ TISS SMPL: CPT | Performed by: PHYSICIAN ASSISTANT

## 2020-03-02 RX ORDER — CEPHALEXIN 500 MG/1
500 CAPSULE ORAL 3 TIMES DAILY
Qty: 30 CAPSULE | Refills: 0 | Status: SHIPPED | OUTPATIENT
Start: 2020-03-02 | End: 2020-06-20

## 2020-03-02 NOTE — PROGRESS NOTES
SUBJECTIVE:  Chief Complaint   Patient presents with     Foreign Body in Skin     sliver in R thumb     Pascual Perea is a 79 year old male presents with a chief complaint of right thumb foreign body and infection.  The injury occurred 3 day(s) ago.   The injury happened while working at home. How: sliver.  The patient complained of mild pain  and has not had decreased ROM.  Pain exacerbated by movement.  Relieved by rest.  He treated it initially with no therapy. This is the first time this type of injury has occurred to this patient.     Past Medical History:   Diagnosis Date     Adhesive capsulitis of shoulder      Anemia 3/10/2014     Anemia, unspecified      Antiplatelet or antithrombotic long-term use      Arrhythmia     Atrial fib/flutter     CAD (coronary artery disease)      History of cardioversion 04/24/2018    a single synchronized shock of 120 joules restored normal sinus rhythm     History of cardioversion 02/13/2019    single shock , 200 joules successul in restoring NSR     Hyperlipidemia LDL goal <70 5/26/2011     Hypertension      Hypothyroidism      Impotence of organic origin      Laceration of finger  June 2010     left thumb s/p sutures     Numbness and tingling     diabetic neuropathy bilateral feet     BRANDON (obstructive sleep apnea)     intolerant of CPAP, uses it occasionally.  Using mandibular device occasionally     Osteopenia      Pulmonary hypertension (H) 4/6/2012     Sensorineural hearing loss, unspecified      Stented coronary artery 1997    CABG      Testosterone deficiency      Type 2 diabetes mellitus without complication  (goal A1C<8) 10/24/2015     Typical atrial flutter (H) 4/18/16     Unspecified hypothyroidism      Vitamin D deficiency 9/3/2011     Allergies   Allergen Reactions     Omeprazole      diarrhea     Social History     Tobacco Use     Smoking status: Former Smoker     Packs/day: 1.00     Years: 6.00     Pack years: 6.00     Types: Cigarettes     Start date: 1958      Last attempt to quit: 1964     Years since quittin.7     Smokeless tobacco: Never Used   Substance Use Topics     Alcohol use: Yes     Comment: couple drinks a year     Family History   Problem Relation Age of Onset     Genitourinary Problems Father         d: age 89; ARF     Hypertension Father      Diabetes Mother         d: age 68, CAD     Heart Disease Mother         MI     Myocardial Infarction Mother      Cardiovascular Brother         d:  age 31; CAD     Heart Disease Brother         age 31, MI     Diabetes Sister         b:  1939; DM2       ROS:  CONSTITUTIONAL:NEGATIVE for fever, chills, change in weight  INTEGUMENTARY/SKIN: POSITIVE for right thumb sliver with erythem  ENT/MOUTH: NEGATIVE for ear, mouth and throat problems  RESP:NEGATIVE for significant cough or SOB  CV: NEGATIVE for chest pain, palpitations or peripheral edema  GI: NEGATIVE for nausea, abdominal pain, heartburn, or change in bowel habits  MUSCULOSKELETAL: POSITIVE for right thumb tenderness, localized swelling  NEURO: NEGATIVE for weakness, dizziness or paresthesias    EXAM:   /57   Pulse 92   Resp 18   Wt 89.4 kg (197 lb)   SpO2 100%   BMI 27.48 kg/m    Gen: healthy,alert,no distress  Extremity: finger  first has erythema, swelling and point tenderness .   There is not compromise to the distal circulation.  Pulses are +2 and CRT is brisk  GENERAL APPEARANCE: healthy, alert and no distress  EXTREMITIES: peripheral pulses normal  MS:  Positive for right thumb tenderness, foreign body  SKIN: Positive for sliver in finger, erythema, localized swelling  NEURO: Normal strength and tone, sensory exam grossly normal, mentation intact and speech normal    ASSESSMENT/PLAN      ICD-10-CM    1. Cellulitis, unspecified cellulitis site L03.90 cephALEXin (KEFLEX) 500 MG capsule   2. Foreign body (FB) in soft tissue M79.5 REMOVE FOREIGN BODY SIMPLE       Warm finger soaks  Keflex for infection  Follow up with PCP as  needed    PROCEDURE:  11 blade used to make incision into thumb  Small piece for glass removed  Dressed with bacitracin  Patient tolerated procedure well

## 2020-03-10 ENCOUNTER — TRANSFERRED RECORDS (OUTPATIENT)
Dept: HEALTH INFORMATION MANAGEMENT | Facility: CLINIC | Age: 80
End: 2020-03-10

## 2020-03-10 LAB — RETINOPATHY: NEGATIVE

## 2020-03-19 ENCOUNTER — DOCUMENTATION ONLY (OUTPATIENT)
Dept: CARDIOLOGY | Facility: CLINIC | Age: 80
End: 2020-03-19

## 2020-03-19 DIAGNOSIS — E78.5 HYPERLIPIDEMIA LDL GOAL <70: Primary | ICD-10-CM

## 2020-03-19 DIAGNOSIS — R74.8 ELEVATED LIVER ENZYMES: ICD-10-CM

## 2020-03-19 LAB
ALBUMIN SERPL-MCNC: 3.9 G/DL (ref 3.4–5)
ALP SERPL-CCNC: 108 U/L (ref 40–150)
ALT SERPL W P-5'-P-CCNC: 83 U/L (ref 0–70)
ANION GAP SERPL CALCULATED.3IONS-SCNC: 6 MMOL/L (ref 3–14)
AST SERPL W P-5'-P-CCNC: 32 U/L (ref 0–45)
BILIRUB SERPL-MCNC: 0.7 MG/DL (ref 0.2–1.3)
BUN SERPL-MCNC: 20 MG/DL (ref 7–30)
CALCIUM SERPL-MCNC: 10.5 MG/DL (ref 8.5–10.1)
CHLORIDE SERPL-SCNC: 105 MMOL/L (ref 94–109)
CO2 SERPL-SCNC: 26 MMOL/L (ref 20–32)
CREAT SERPL-MCNC: 1.11 MG/DL (ref 0.66–1.25)
GFR SERPL CREATININE-BSD FRML MDRD: 63 ML/MIN/{1.73_M2}
GLUCOSE SERPL-MCNC: 220 MG/DL (ref 70–99)
POTASSIUM SERPL-SCNC: 4.1 MMOL/L (ref 3.4–5.3)
PROT SERPL-MCNC: 6.9 G/DL (ref 6.8–8.8)
SODIUM SERPL-SCNC: 137 MMOL/L (ref 133–144)

## 2020-03-19 PROCEDURE — 36415 COLL VENOUS BLD VENIPUNCTURE: CPT | Performed by: PHYSICIAN ASSISTANT

## 2020-03-19 PROCEDURE — 80053 COMPREHEN METABOLIC PANEL: CPT | Performed by: PHYSICIAN ASSISTANT

## 2020-03-19 NOTE — PROGRESS NOTES
Dr. Jarvis - ALT remains elevated despite stopping pravastatin 80 mg daily 2/17/2020. You previously had stopped Amiodarone 11/2019 due to AST/ALT elevation.    ** OK to restart pravastatin 80 as ALT hasn't changed (and is actually a bit higher than when he was on it)?    Thx - Joleen      Component ALT AST   Latest Ref Rng & Units 0 - 70 U/L 0 - 45 U/L   11/19/2019 137 (H) 63 (H)   12/20/2019 74 (H) 38   12/30/2019 86 (H) 45   1/8/2020 74 (H) 42   3/19/2020 83 (H) 32

## 2020-03-20 RX ORDER — PRAVASTATIN SODIUM 80 MG/1
80 TABLET ORAL DAILY
Start: 2020-03-20 | End: 2020-04-14

## 2020-03-20 NOTE — PROGRESS NOTES
Called Dayday to let him know Dr. Jarvis's recommendation to restart pravastatin 80 mg daily.    Asked him to call with questions, otherwise:  1. Restart pravastatin 80 mg  2. Recheck non-fasting CMP in 3 m.     Order placed. Phone # given. Epic updated.          Satnam Jarvis MD  You 1 minute ago (1:29 PM)          Yes, he can go back on pravachol but let's check another alt/ast in 2-3 months.         Documentation

## 2020-03-30 DIAGNOSIS — E03.9 HYPOTHYROIDISM, UNSPECIFIED TYPE: ICD-10-CM

## 2020-03-30 NOTE — TELEPHONE ENCOUNTER
"Requested Prescriptions   Pending Prescriptions Disp Refills     levothyroxine (SYNTHROID/LEVOTHROID) 125 MCG tablet [Pharmacy Med Name: LEVOTHYROXINE SODIUM 125MCG TABS] 90 tablet 1     Sig: TAKE ONE TABLET BY MOUTH ONCE DAILY       Thyroid Protocol Passed - 3/30/2020 10:27 AM        Passed - Patient is 12 years or older        Passed - Recent (12 mo) or future (30 days) visit within the authorizing provider's specialty     Patient has had an office visit with the authorizing provider or a provider within the authorizing providers department within the previous 12 mos or has a future within next 30 days. See \"Patient Info\" tab in inbasket, or \"Choose Columns\" in Meds & Orders section of the refill encounter.              Passed - Medication is active on med list        Passed - Normal TSH on file in past 12 months     Recent Labs   Lab Test 01/08/20  1514   TSH 0.44                 Last Written Prescription Date:  10/1/2019  Last Fill Quantity: 90,  # refills: 1   Last office visit: 1/14/2020 with prescribing provider:  1/14/2020   Future Office Visit:      "

## 2020-03-31 RX ORDER — LEVOTHYROXINE SODIUM 125 UG/1
TABLET ORAL
Qty: 90 TABLET | Refills: 1 | Status: SHIPPED | OUTPATIENT
Start: 2020-03-31 | End: 2020-04-14 | Stop reason: DRUGHIGH

## 2020-04-13 ENCOUNTER — NURSE TRIAGE (OUTPATIENT)
Dept: CARDIOLOGY | Facility: CLINIC | Age: 80
End: 2020-04-13

## 2020-04-13 ENCOUNTER — DOCUMENTATION ONLY (OUTPATIENT)
Dept: CARDIOLOGY | Facility: CLINIC | Age: 80
End: 2020-04-13

## 2020-04-13 DIAGNOSIS — E78.5 HYPERLIPIDEMIA LDL GOAL <70: ICD-10-CM

## 2020-04-13 DIAGNOSIS — I48.19 PERSISTENT ATRIAL FIBRILLATION (H): Primary | ICD-10-CM

## 2020-04-13 DIAGNOSIS — I48.19 PERSISTENT ATRIAL FIBRILLATION (H): ICD-10-CM

## 2020-04-13 DIAGNOSIS — E11.9 TYPE 2 DIABETES MELLITUS WITHOUT COMPLICATION, WITH LONG-TERM CURRENT USE OF INSULIN (H): ICD-10-CM

## 2020-04-13 DIAGNOSIS — I48.0 PAROXYSMAL ATRIAL FIBRILLATION (H): Primary | ICD-10-CM

## 2020-04-13 DIAGNOSIS — Z79.4 TYPE 2 DIABETES MELLITUS WITHOUT COMPLICATION, WITH LONG-TERM CURRENT USE OF INSULIN (H): ICD-10-CM

## 2020-04-13 DIAGNOSIS — E03.9 HYPOTHYROIDISM, UNSPECIFIED TYPE: ICD-10-CM

## 2020-04-13 DIAGNOSIS — I48.3 TYPICAL ATRIAL FLUTTER (H): Primary | ICD-10-CM

## 2020-04-13 LAB
ALBUMIN SERPL-MCNC: 4 G/DL (ref 3.4–5)
ALP SERPL-CCNC: 121 U/L (ref 40–150)
ALT SERPL W P-5'-P-CCNC: 78 U/L (ref 0–70)
ANION GAP SERPL CALCULATED.3IONS-SCNC: 4 MMOL/L (ref 3–14)
AST SERPL W P-5'-P-CCNC: 33 U/L (ref 0–45)
BASOPHILS # BLD AUTO: 0.1 10E9/L (ref 0–0.2)
BASOPHILS NFR BLD AUTO: 1.2 %
BILIRUB SERPL-MCNC: 0.6 MG/DL (ref 0.2–1.3)
BUN SERPL-MCNC: 20 MG/DL (ref 7–30)
CALCIUM SERPL-MCNC: 10.2 MG/DL (ref 8.5–10.1)
CHLORIDE SERPL-SCNC: 105 MMOL/L (ref 94–109)
CHOLEST SERPL-MCNC: 353 MG/DL
CO2 SERPL-SCNC: 29 MMOL/L (ref 20–32)
CREAT SERPL-MCNC: 1.12 MG/DL (ref 0.66–1.25)
DIFFERENTIAL METHOD BLD: ABNORMAL
EOSINOPHIL # BLD AUTO: 0.1 10E9/L (ref 0–0.7)
EOSINOPHIL NFR BLD AUTO: 2.8 %
ERYTHROCYTE [DISTWIDTH] IN BLOOD BY AUTOMATED COUNT: 13.3 % (ref 10–15)
GFR SERPL CREATININE-BSD FRML MDRD: 62 ML/MIN/{1.73_M2}
GLUCOSE SERPL-MCNC: 227 MG/DL (ref 70–99)
HBA1C MFR BLD: 6.9 % (ref 0–5.6)
HCT VFR BLD AUTO: 38.8 % (ref 40–53)
HDLC SERPL-MCNC: 44 MG/DL
HGB BLD-MCNC: 13.4 G/DL (ref 13.3–17.7)
IMM GRANULOCYTES # BLD: 0 10E9/L (ref 0–0.4)
IMM GRANULOCYTES NFR BLD: 0.2 %
LDLC SERPL CALC-MCNC: ABNORMAL MG/DL
LDLC SERPL DIRECT ASSAY-MCNC: 139 MG/DL
LYMPHOCYTES # BLD AUTO: 1.2 10E9/L (ref 0.8–5.3)
LYMPHOCYTES NFR BLD AUTO: 28.7 %
MCH RBC QN AUTO: 30 PG (ref 26.5–33)
MCHC RBC AUTO-ENTMCNC: 34.5 G/DL (ref 31.5–36.5)
MCV RBC AUTO: 87 FL (ref 78–100)
MONOCYTES # BLD AUTO: 0.3 10E9/L (ref 0–1.3)
MONOCYTES NFR BLD AUTO: 6.9 %
NEUTROPHILS # BLD AUTO: 2.6 10E9/L (ref 1.6–8.3)
NEUTROPHILS NFR BLD AUTO: 60.2 %
NONHDLC SERPL-MCNC: 309 MG/DL
NRBC # BLD AUTO: 0 10*3/UL
NRBC BLD AUTO-RTO: 0 /100
PLATELET # BLD AUTO: 167 10E9/L (ref 150–450)
POTASSIUM SERPL-SCNC: 4.1 MMOL/L (ref 3.4–5.3)
PROT SERPL-MCNC: 7.2 G/DL (ref 6.8–8.8)
RBC # BLD AUTO: 4.46 10E12/L (ref 4.4–5.9)
SODIUM SERPL-SCNC: 138 MMOL/L (ref 133–144)
T4 FREE SERPL-MCNC: 1.5 NG/DL (ref 0.76–1.46)
TRIGL SERPL-MCNC: 947 MG/DL
TSH SERPL DL<=0.005 MIU/L-ACNC: 0.1 MU/L (ref 0.4–4)
WBC # BLD AUTO: 4.3 10E9/L (ref 4–11)

## 2020-04-13 PROCEDURE — 83721 ASSAY OF BLOOD LIPOPROTEIN: CPT | Performed by: PHYSICIAN ASSISTANT

## 2020-04-13 PROCEDURE — 93000 ELECTROCARDIOGRAM COMPLETE: CPT | Performed by: PHYSICIAN ASSISTANT

## 2020-04-13 PROCEDURE — 84439 ASSAY OF FREE THYROXINE: CPT | Performed by: PHYSICIAN ASSISTANT

## 2020-04-13 PROCEDURE — 85025 COMPLETE CBC W/AUTO DIFF WBC: CPT | Performed by: PHYSICIAN ASSISTANT

## 2020-04-13 PROCEDURE — 80061 LIPID PANEL: CPT | Performed by: PHYSICIAN ASSISTANT

## 2020-04-13 PROCEDURE — 83036 HEMOGLOBIN GLYCOSYLATED A1C: CPT | Performed by: PHYSICIAN ASSISTANT

## 2020-04-13 PROCEDURE — 80053 COMPREHEN METABOLIC PANEL: CPT | Performed by: PHYSICIAN ASSISTANT

## 2020-04-13 PROCEDURE — 84443 ASSAY THYROID STIM HORMONE: CPT | Performed by: PHYSICIAN ASSISTANT

## 2020-04-13 PROCEDURE — 36415 COLL VENOUS BLD VENIPUNCTURE: CPT | Performed by: PHYSICIAN ASSISTANT

## 2020-04-13 NOTE — TELEPHONE ENCOUNTER
Wellness Screening Tool    Symptom Screening:    Do you have one of the following new symptoms:      Fever or reported chills?   No    A new cough (started within the past 14 days)? No    Shortness of breath (started within the past 14 days)? No    Nausea, vomiting or diarrhea? No    Within the past 3 weeks, have you been exposed to someone with a known positive illness below?      COVID - 19 (known or suspected) no    Chicken pox?  no    Measles? no    Pertussis?  No        Patient notified of visitor restriction: Yes    Patient's appointment status: Patient will be seen in clinic as scheduled on 4/13 for labs and an EKG    Pt will come in for an EKG and lab work today and then will arrange for an OV with Dr Jarvis if needed after EKG completed.  Pt is aware that he will come in for labs and EKG and then will go home and will await call with plan going forward. Alias

## 2020-04-13 NOTE — TELEPHONE ENCOUNTER
"Received a call from scheduling to discuss possible A fib symptoms. Spoke with patient who states since last Thursday he has had what he thinks to be some A fib symptoms. Patient reports his heart rate is higher than his normal which is typically 60's-70's but has intermittently been running high 80's to low 90's. Patient also reports he has felt like his heart was pounding  and had some slight dizziness. Patient denies these symptoms currently. Patient states he listened to his heart with a stethoscope and heard some \"hissing\" or \"muffling\" sounds which is new for him. RN had patient check his vitals while on the phone: /78 . RN advised that a message will be sent to the A fib team to follow up with him but to rest and not to exert himself until he speaks to the team nurse. Patient verbalized agreement and understanding. Routing to A fib team for follow up.  "

## 2020-04-13 NOTE — TELEPHONE ENCOUNTER
Spoke to pt who states that he notice about a week ago that his HR was more elevated. Pt stated that on Tuesday he was working and when he stood up he became dizzy and his heart was pumping. Pt normally runs 60-70's as noted in Tessie's note and now is running 80-90 at rest and then up possibly into the 100's with activity.  Pt stated then when he took his BP for Tessie he had to take about 3 steps to get to his monitor and HR was 106 bpm.  Pt states that when he feels his pulse it is very regular. Pt continues on his Eliquis and Amiodarone had been stopped on 11/19/19. Will message Joleen for recommendations. JNelsonROLAND

## 2020-04-13 NOTE — PROGRESS NOTES
Pt c/o 1 week h/o higher HR and he's had dizzy spells.    1) EKG looks like SR with long 1st degree to me - AK interval is actually a little shorter than had been previously. I could not see any hidden Flutter waves but pls run by MD.    2) Labs showed he's HYPERthyroid - is he taking anything but the levothyroxine that Dr. Elizalde refilled 3/30?? Pls let me know and I can always send a note to [facility].  Component      Latest Ref Rng & Units 11/19/2019 1/8/2020 4/13/2020   TSH      0.40 - 4.00 mU/L 0.71 0.44 0.10 (L)   T4 Free      0.76 - 1.46 ng/dL   1.50 (H)     3. Hgb back to wnl  Component Hemoglobin Hematocrit   Latest Ref Rng & Units 13.3 - 17.7 g/dL 40.0 - 53.0 %   1/8/2020 13.0 (L) 37.4 (L)   2/18/2020 12.1 (L) 34.6 (L)   4/13/2020 13.4 38.8 (L)     4. CMP looks better. Normal lytes and Cr. ALT still high but slightly better.    * Let's see what MD thinks of EKG and make sure he's taking thyroid supplement correctly. Could also order an event monitor or ZioPatch if he was feeling fine at time of EKG to see what HR's doing.    Joleen

## 2020-04-13 NOTE — TELEPHONE ENCOUNTER
If he's having the issue now, then yes would get EKG. Would also get updated labs (last ones done 3/19/2020) with CMP, CBC.    Would get him on with Dr. Jarvis for OV is he has availability. Can review EKG and labs at that time.    To ER if feeling really terrible; make sure to hydrate.    Vinnie Goodrich

## 2020-04-14 ENCOUNTER — DOCUMENTATION ONLY (OUTPATIENT)
Dept: CARDIOLOGY | Facility: CLINIC | Age: 80
End: 2020-04-14

## 2020-04-14 DIAGNOSIS — E78.5 HYPERLIPIDEMIA LDL GOAL <70: ICD-10-CM

## 2020-04-14 DIAGNOSIS — E03.9 HYPOTHYROIDISM, UNSPECIFIED TYPE: ICD-10-CM

## 2020-04-14 DIAGNOSIS — Z79.4 TYPE 2 DIABETES MELLITUS WITHOUT COMPLICATION, WITH LONG-TERM CURRENT USE OF INSULIN (H): ICD-10-CM

## 2020-04-14 DIAGNOSIS — E11.9 TYPE 2 DIABETES MELLITUS WITHOUT COMPLICATION, WITH LONG-TERM CURRENT USE OF INSULIN (H): ICD-10-CM

## 2020-04-14 RX ORDER — LEVOTHYROXINE SODIUM 100 UG/1
100 TABLET ORAL DAILY
Qty: 30 TABLET | Refills: 11 | Status: SHIPPED | OUTPATIENT
Start: 2020-04-14 | End: 2021-04-12

## 2020-04-14 RX ORDER — METOPROLOL TARTRATE 25 MG/1
25 TABLET, FILM COATED ORAL 2 TIMES DAILY
Qty: 60 TABLET | Refills: 0 | Status: SHIPPED | OUTPATIENT
Start: 2020-04-14 | End: 2020-04-20

## 2020-04-14 RX ORDER — PRAVASTATIN SODIUM 80 MG/1
80 TABLET ORAL DAILY
Qty: 90 TABLET | Refills: 3 | Status: SHIPPED | OUTPATIENT
Start: 2020-04-14 | End: 2020-11-19 | Stop reason: ALTCHOICE

## 2020-04-14 NOTE — PROGRESS NOTES
Spoke to pt to make him aware that EKG looks like SR, but wanting to verify with Dr Jarvis and get his recommendations.  Also discussed his lab work and that the CMP was good with liver still elevated, but improving.  Explained that his TSH is now abnormal and wondering if any changes have been made to his Thyroid medication regime and pt stated that it had not.  States the only medication that was changed, was when he stopped that Pravastatin.  Pt made aware that this writer will make Joleen aware, who will message Dr Elizalde, with the recent lab result.  Pt states understanding and will be available all day when this writer calls back with Dr Jarvis recommendations. Alisa

## 2020-04-14 NOTE — PROGRESS NOTES
Juancarlos Elizalde!  ??hyperthyroidism??    Pt called into EP with c/o dizziness/ER visit for orthostasis and noted slightly higher HR than he's used to. Due to concerns re: recurrent arrhythmia, I got repeat basic labs but looks like they also did early draw on ones you had ordered.    Noted he had abnl thyroid levels despite no change to thyroid supplement dosing.  My RN called him - he's taking levothyroxine 125 mcg daily as you just refilled 3/31/2020.  Component T4 Free TSH   Latest Ref Rng & Units 0.76 - 1.46 ng/dL 0.40 - 4.00 mU/L   3/22/2019 1.09    4/25/2019 1.08    11/19/2019  0.71   1/8/2020  0.44   4/13/2020 1.50 (H) 0.10 (L)     Wanted you to be aware in case you need to repeat labs sooner!  We adjusted some BP meds and my RNs will call him again next week to see if we need a monitor to look for arrhythmias (he's been off Amio since 11/2019).    Joleen Marrero, EP PA

## 2020-04-14 NOTE — TELEPHONE ENCOUNTER
Af could be paroxysmal. Pt was in ed recently for near-syncope and appears to be orthostatic in nature. What's his bp like at home? If less than 120. Not a bad idea to stop lisinopril and start metoprolol 25 mg bid and reassess.

## 2020-04-14 NOTE — PROGRESS NOTES
Per Dr Jarvis.  Note also in another encounter started by Tessie Clayton. Af could be paroxysmal. Pt was in ed recently for near-syncope and appears to be orthostatic in nature. What's his bp like at home? If less than 120. Not a bad idea to stop lisinopril and start metoprolol 25 mg bid and reassess.    Discussed this with pt who states that today hi BP was 117/63. States that yesterday day BP in the 150's is not normal.  Pt states that they had been averaging 120-130's systolic.  Pt will try the change to Metoprolol 25 Mg bid and stop the lisinopril at this time.  Pt told not to get rid of the Lisinopril.  Pt will start taking Metoprolol tomorrow as he already took lisinopril this AM. Pt has been asked to monitor his BP daily and and also when he has a dizzy spell. Pt reminded to stay hydrated and call if he there is an issue.  If no issues pt will touch base with A fib nurse on Friday or may next Wednesday, which will be one week from med change. Pt has been given A Fib nurse line to call. Med list updated and escript sent. Alisa

## 2020-04-14 NOTE — TELEPHONE ENCOUNTER
See further notes on this episode on documentation started by Joleen Marrero on 4/13. JNelsonROLAND

## 2020-04-14 NOTE — PROGRESS NOTES
Call pt.   1. Where thyroid function previous normal, thyroid function now too high with current Levothyroxine dose so need to stop Levothyroxine 125mcg tab  NOW and start lower dose l;levothyroxine 100mcg tab, 1 tab daily for thyroid function.    2. Hx mild liver inflammation. Cardiology had wondered  if related to  Pravastatin so they took pt off of Pravastatin and now cholesterol numbers VERY high and liver tests minimally improved and still showing a very mild inflammation that will be watched. Therefore I would recommend pt restart taking Pravastatin 80mg tab, 1 tab daily  At bedtime or later PM for cholesterol. [facility] but otherwise things OK. Blood sugars now well controlled with diabetes    3. Pt to repeat fasting labs at [facility] in early June (about 6 weeks from now)    4. Rx for lower dose levothyroxine and the Pravastatin were sent to [facility] pharmacy

## 2020-04-20 RX ORDER — METOPROLOL TARTRATE 25 MG/1
50 TABLET, FILM COATED ORAL 2 TIMES DAILY
Qty: 60 TABLET | Refills: 0
Start: 2020-04-20 | End: 2020-04-28

## 2020-04-20 NOTE — PROGRESS NOTES
His sxs could still be related to his hyperthyroidism (faster HR specifically).  Dr. Elizalde just had him change meds last week.    1) He could be having paroxysmal AFib (EKG when he called abt these sxs showed SR, but could have episodic AFib as has been off the amio since 11/2019).   * Pls get 3-7 days ZioPatch (depending on how often he's getting the sxs)    2) Continue hydration    3) Stay off of lisinopril for now but INCREASE metoprolol tartrate to 50 mg BID.  Update Epic if he's agreeable.    Virtual visit with me or QP to review monitor. Vinnie Goodrich

## 2020-04-20 NOTE — PROGRESS NOTES
04/20/20 Called pt and explained recommendations. Decided that 7 day ZP would be appropriate based on sx and duration. Encouraged hydration and explained increase in Metoprolol to 50 mg BID. Med list updated. Tx to scheduling to facilitate. Pt voiced understanding and agreement w jameson Gonzales 420 pm

## 2020-04-20 NOTE — PROGRESS NOTES
"4/20/20 Pt called reporting that he is still having high heart rates running in the 85-95 bpm range. He describes it feeling like \"my heart is really pounding hard in there\". Does get occasional chest ache when he exerts himself but denies sensation right now. He is bothered by this because he was hopeful that with the recent medication change ( Lisinopril stopped and Metoprolol started) his HR would have better control. Pt denies any more dizzy spells. Bps have remained stable. Will update Joleen Marrero PA-C and call pt back w recommendations. Pt voiced understanding and agreement w plan. Christian 312 pm .  "

## 2020-04-21 ENCOUNTER — HOSPITAL ENCOUNTER (OUTPATIENT)
Dept: CARDIOLOGY | Facility: CLINIC | Age: 80
Discharge: HOME | End: 2020-04-21
Attending: PHYSICIAN ASSISTANT | Admitting: PHYSICIAN ASSISTANT
Payer: COMMERCIAL

## 2020-04-21 DIAGNOSIS — I48.0 PAROXYSMAL ATRIAL FIBRILLATION (H): ICD-10-CM

## 2020-04-21 PROCEDURE — 0298T ZIO PATCH HOLTER ADULT PEDIATRIC GREATER THAN 48 HRS: CPT | Performed by: INTERNAL MEDICINE

## 2020-04-21 PROCEDURE — 0296T ZIO PATCH HOLTER ADULT PEDIATRIC GREATER THAN 48 HRS: CPT

## 2020-04-27 DIAGNOSIS — Z79.4 TYPE 2 DIABETES MELLITUS WITHOUT COMPLICATION, WITH LONG-TERM CURRENT USE OF INSULIN (H): ICD-10-CM

## 2020-04-27 DIAGNOSIS — E11.9 TYPE 2 DIABETES MELLITUS WITHOUT COMPLICATION, WITH LONG-TERM CURRENT USE OF INSULIN (H): ICD-10-CM

## 2020-04-28 DIAGNOSIS — I10 BENIGN ESSENTIAL HYPERTENSION: Primary | ICD-10-CM

## 2020-04-28 DIAGNOSIS — I48.0 PAROXYSMAL ATRIAL FIBRILLATION (H): ICD-10-CM

## 2020-04-28 RX ORDER — BLOOD SUGAR DIAGNOSTIC
STRIP MISCELLANEOUS
Qty: 300 EACH | Refills: 3 | OUTPATIENT
Start: 2020-04-28

## 2020-04-28 RX ORDER — METOPROLOL TARTRATE 50 MG
50 TABLET ORAL 2 TIMES DAILY
Qty: 180 TABLET | Refills: 3 | Status: SHIPPED | OUTPATIENT
Start: 2020-04-28 | End: 2020-04-28 | Stop reason: DRUGHIGH

## 2020-04-28 RX ORDER — METOPROLOL TARTRATE 25 MG/1
25 TABLET, FILM COATED ORAL 2 TIMES DAILY
Qty: 180 TABLET | Refills: 0 | Status: SHIPPED | OUTPATIENT
Start: 2020-04-28 | End: 2020-08-12

## 2020-04-28 NOTE — TELEPHONE ENCOUNTER
Spoke with patient today.     States he does need a refill on test strips and tests his BS 4 times daily.     Updating order and sending it to pharmacy per his request.

## 2020-05-17 NOTE — PROGRESS NOTES
"Pascual Perea is a 79 year old male who is being evaluated via a billable telephone visit.      The patient has been notified of following:     \"This telephone visit will be conducted via a call between you and your physician/provider. We have found that certain health care needs can be provided without the need for a physical exam.  This service lets us provide the care you need with a short phone conversation.  If a prescription is necessary we can send it directly to your pharmacy.  If lab work is needed we can place an order for that and you can then stop by our lab to have the test done at a later time.    Telephone visits are billed at different rates depending on your insurance coverage. During this emergency period, for some insurers they may be billed the same as an in-person visit.  Please reach out to your insurance provider with any questions.    If during the course of the call the physician/provider feels a telephone visit is not appropriate, you will not be charged for this service.\"    Patient has given verbal consent for Telephone visit?  Yes    What phone number would you like to be contacted at? [phone]    How would you like to obtain your AVS? MyChart    CC:  Atrial Fibrillation    VITALS:  Bp:138/65 - ranging 130-140/70 thoughtout May  Pulse:65  Wt:195.0lb     Kassy Flores [facility]    BRIEF HPI:  80 yo M who has seen Dr. Adams and Dr. Jarvis for his h/o:    1. Persistent AFib first diagnosed 5/2016, s/p DCCV 2016 and again 4/2018. Started on Amiodarone and underwent DCCV 2/2019 and 5/2019 (after cholecystectomy triggered AFib 3/2019). Dr. Jarvis noted we could proceed with PVI vs DCCV.  Amiodarone stopped 11/2019 due to abnl LFTs.  2. Benign essential hypertension  3. HFpEF, with fluid overload and ascites. EF 55% with mildly decreased RVSF. Low dose Lasix working well  4. CAD s/p CABG 1997. Stress echo 1/2020 during hospitalization 1/2020 was normal.     I saw Dayday 2/2020 at which time we " "reviewed his ZioPatch 1/2020 showing no evidence of recurrent AFib despite being off Amio since 11/2019.  His ALT remained a bit high and he had him hold pravastatin and 3 m follow-up was recommended.    He was in the ER 2/18/2020 after developing lightheadedness after doing a reading in [employer]. He was orthostatic and got fluids, which improved sxs.    He contacted us 4/13 c/o HRs 80-90s and feeling like he was having some AFib a/w lightheadedness.  EKG was done showing SR. Dr. Jarvis reviewed, started metoprolol tartrate 25 mg BID and stopped lisinopril and repeat ZP was recommended.     Labs done showed he was hyperthyroid. Dr. Elizalde reviewed and reduced levothyroxine though pt told me today he was not aware and has continued 125 mcg daily. Pravastatin restarted due to elevated LDL and no sig change in LFTs.    ZP done 4/21 showed no AFib.     INTERVAL HISTORY:  He states that starting the night of 4/20, he starting feeling much better. Had no issues with palpitations while wearing his ZioPatch and has been \"perfect\" since.    Doesn't think that he changed the thyroid supplementation dose and is still taking 125 mcg daily.  Doesn't think he picked up new Rx at the pharmacy.  He also notes that he never increased the metoprolol to 50 mg BID as was relayed to him during a phone call 4/20.    He is back on pravastatin 80. Not taking lisinopril 30 mg daily.    DIAGNOSTICS:  1 week ZioPatch 4/2020 showed SR with avg HR 64 bpm (range  bpm). 1 5-beat run of SVT noted, avg  with rates to 110 bpm @ 04/24 @ ~0900. Asx'c. Rare ectopy noted.  ZioPatch x 2 weeks 1/2020 showed SR (79 bpm, range  bpm) and minimal ectopy <1%. Intermittent Mobitz I noted (1/15 @ 0718; 1/21 @1114), both with sleeping  Stress Echocardiogam 1/2020 was normal  Echocardiogram 4/25/2019 EF 55% with mildly decreased right ventricular systolic function.  Mild aortic stenosis with a mean gradient of 9 mmHg and an aortic valve area 1.7 cm . "  1+ mitral regurgitation was noted.  Left atrium was dilated at 4.6 cm with a volume index of 51.8 mL/m .  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 Persistent AFib    DCCV 5/2019     Amiodarone stopped 11/2019. Last 2 ZioPatches have shown no recurrence (1/2020, 4/2020)    Remains on Eliquis 5 mg BID for CHADSVASc 5 for CHADSVASc (HFpEF, HTN, DM, CAD, age)     Note he became hyperthyroid despite no change to his medication - could have precipitated palpitations/heart 'pounding.' His sxs have improved despite not changing his medication     PLAN:    Continue Eliquis    May require PVI if he's found to have recurrent sx'c AFib    OV 3 m    2. Elevated LFTs    Noted 3/2019 at time of cholecystectomy, levels normalized but then increased again 11/2019. Amio stopped; AST improved, ALT improved slightly. Pravastatin stopped 2/2020. Restarted 4/2020 as no sig improvement in ALT and elevated LDL to 139 mg/dL  Component ALT AST   Latest Ref Rng & Units 0 - 70 U/L 0 - 45 U/L   8/13/2018 21 18   3/13/2019 271 (H) 249 (H)   3/14/2019 199 (H) 137 (H)   3/16/2019 172 (H) 106 (H)   3/18/2019 162 (H) 62 (H)   3/22/2019 91 (H) 32   3/29/2019 67 33   4/19/2019 46 25   4/25/2019 45 20   9/30/2019 39 22   11/19/2019 137 (H) 63 (H)   12/20/2019 74 (H) 38   12/30/2019 86 (H) 45   1/8/2020 74 (H) 42   3/19/2020 83 (H) 32   4/13/2020 78 (H) 33      PLAN:    Has plans with Dr. Elizalde for follow-up     3. CAD    Hospitalized 1/2020 for shoulder pain. Stress echo 1/2020 wnl with negative troponin     PLAN:    Continue metoprolol tartrate 25 mg BID, pravastatin 80    No ASA due to Eliquis use    4. HFpEF    Echo 4/2019 with EF 55%; mildly reduced RVSF. Mild aortic stenosis and mild MR.     Stress echo 1/2020 with normal EF    Continues on Lasix 20 mg daily    Lisinopril stopped to help prevent hypotension with increase in  BB     PLAN:    As BPs climbing, will get back on lisinopril 30    See me 3 m    5. Thyroid issues    As above, Dr. Elizalde decreased levothyroxine from 125 mcg to 100 mcg 4/14/2020. New Rx sent in but pt did not pick this up. He doesn't think he was called about this    Remains on levothyroxine 125     PLAN:     new Rx for [place] that Dr. Elizalde sent in - 100 mcg daily    I'll let Dr. Elizalde know as labs may need to be postponed    I have reviewed the note as documented above.  This accurately captures the substance of my conversation with the patient.        Phone call contact time  Call Started at 1219  Call Ended at 1246  (27 minutes)    SARAH SolerAS

## 2020-05-18 ENCOUNTER — VIRTUAL VISIT (OUTPATIENT)
Dept: CARDIOLOGY | Facility: CLINIC | Age: 80
End: 2020-05-18
Payer: COMMERCIAL

## 2020-05-18 ENCOUNTER — DOCUMENTATION ONLY (OUTPATIENT)
Dept: CARDIOLOGY | Facility: CLINIC | Age: 80
End: 2020-05-18

## 2020-05-18 VITALS
BODY MASS INDEX: 27.3 KG/M2 | HEART RATE: 65 BPM | DIASTOLIC BLOOD PRESSURE: 65 MMHG | WEIGHT: 195 LBS | SYSTOLIC BLOOD PRESSURE: 138 MMHG | HEIGHT: 71 IN

## 2020-05-18 DIAGNOSIS — I48.0 PAROXYSMAL ATRIAL FIBRILLATION (H): Primary | ICD-10-CM

## 2020-05-18 PROCEDURE — 99214 OFFICE O/P EST MOD 30 MIN: CPT | Mod: 95 | Performed by: PHYSICIAN ASSISTANT

## 2020-05-18 RX ORDER — LISINOPRIL 30 MG/1
30 TABLET ORAL DAILY
Start: 2020-05-18 | End: 2020-11-03

## 2020-05-18 ASSESSMENT — MIFFLIN-ST. JEOR: SCORE: 1626.64

## 2020-05-18 NOTE — LETTER
5/18/2020      RE: Pascual Perea  [address]  [place] [address]       Dear Colleague,    Thank you for the opportunity to participate in the care of your patient, Pascual Perea, at the [facility] at [facility]. Please see a copy of my visit note below.    BRIEF HPI:  80 yo M who has seen Dr. Adams and Dr. Jarvis for his h/o:    1. Persistent AFib first diagnosed 5/2016, s/p DCCV 2016 and again 4/2018. Started on Amiodarone and underwent DCCV 2/2019 and 5/2019 (after cholecystectomy triggered AFib 3/2019). Dr. Jarvis noted we could proceed with PVI vs DCCV.  Amiodarone stopped 11/2019 due to abnl LFTs.  2. Benign essential hypertension  3. HFpEF, with fluid overload and ascites. EF 55% with mildly decreased RVSF. Low dose Lasix working well  4. CAD s/p CABG 1997. Stress echo 1/2020 during hospitalization 1/2020 was normal.     I saw Dayday 2/2020 at which time we reviewed his ZioPatch 1/2020 showing no evidence of recurrent AFib despite being off Amio since 11/2019.  His ALT remained a bit high and he had him hold pravastatin and 3 m follow-up was recommended.    He was in the ER 2/18/2020 after developing lightheadedness after doing a reading in [employer]. He was orthostatic and got fluids, which improved sxs.    He contacted us 4/13 c/o HRs 80-90s and feeling like he was having some AFib a/w lightheadedness.  EKG was done showing SR. Dr. Jarvis reviewed, started metoprolol tartrate 25 mg BID and stopped lisinopril and repeat ZP was recommended.     Labs done showed he was hyperthyroid. Dr. Elizalde reviewed and reduced levothyroxine though pt told me today he was not aware and has continued 125 mcg daily. Pravastatin restarted due to elevated LDL and no sig change in LFTs.    ZP done 4/21 showed no AFib.     INTERVAL HISTORY:  He states that starting the night of 4/20, he starting feeling much better. Had no issues with palpitations  "while wearing his ZioPatch and has been \"perfect\" since.    Doesn't think that he changed the thyroid supplementation dose and is still taking 125 mcg daily.  Doesn't think he picked up new Rx at the pharmacy.  He also notes that he never increased the metoprolol to 50 mg BID as was relayed to him during a phone call 4/20.    He is back on pravastatin 80. Not taking lisinopril 30 mg daily.    DIAGNOSTICS:  1 week ZioPatch 4/2020 showed SR with avg HR 64 bpm (range  bpm). 1 5-beat run of SVT noted, avg  with rates to 110 bpm @ 04/24 @ ~0900. Asx'c. Rare ectopy noted.  ZioPatch x 2 weeks 1/2020 showed SR (79 bpm, range  bpm) and minimal ectopy <1%. Intermittent Mobitz I noted (1/15 @ 0718; 1/21 @1114), both with sleeping  Stress Echocardiogam 1/2020 was normal  Echocardiogram 4/25/2019 EF 55% with mildly decreased right ventricular systolic function.  Mild aortic stenosis with a mean gradient of 9 mmHg and an aortic valve area 1.7 cm .  1+ mitral regurgitation was noted.  Left atrium was dilated at 4.6 cm with a volume index of 51.8 mL/m .  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 Persistent AFib    DCCV 5/2019     Amiodarone stopped 11/2019. Last 2 ZioPatches have shown no recurrence (1/2020, 4/2020)    Remains on Eliquis 5 mg BID for CHADSVASc 5 for CHADSVASc (HFpEF, HTN, DM, CAD, age)     Note he became hyperthyroid despite no change to his medication - could have precipitated palpitations/heart 'pounding.' His sxs have improved despite not changing his medication     PLAN:    Continue Eliquis    May require PVI if he's found to have recurrent sx'c AFib    OV 3 m    2. Elevated LFTs    Noted 3/2019 at time of cholecystectomy, levels normalized but then increased again 11/2019. Amio stopped; AST improved, ALT improved slightly. Pravastatin stopped 2/2020. Restarted 4/2020 as no sig " improvement in ALT and elevated LDL to 139 mg/dL  Component ALT AST   Latest Ref Rng & Units 0 - 70 U/L 0 - 45 U/L   8/13/2018 21 18   3/13/2019 271 (H) 249 (H)   3/14/2019 199 (H) 137 (H)   3/16/2019 172 (H) 106 (H)   3/18/2019 162 (H) 62 (H)   3/22/2019 91 (H) 32   3/29/2019 67 33   4/19/2019 46 25   4/25/2019 45 20   9/30/2019 39 22   11/19/2019 137 (H) 63 (H)   12/20/2019 74 (H) 38   12/30/2019 86 (H) 45   1/8/2020 74 (H) 42   3/19/2020 83 (H) 32   4/13/2020 78 (H) 33      PLAN:    Has plans with Dr. Elizalde for follow-up     3. CAD    Hospitalized 1/2020 for shoulder pain. Stress echo 1/2020 wnl with negative troponin     PLAN:    Continue metoprolol tartrate 25 mg BID, pravastatin 80    No ASA due to Eliquis use    4. HFpEF    Echo 4/2019 with EF 55%; mildly reduced RVSF. Mild aortic stenosis and mild MR.     Stress echo 1/2020 with normal EF    Continues on Lasix 20 mg daily    Lisinopril stopped to help prevent hypotension with increase in BB     PLAN:    As BPs climbing, will get back on lisinopril 30    See me 3 m    5. Thyroid issues    As above, Dr. Elizalde decreased levothyroxine from 125 mcg to 100 mcg 4/14/2020. New Rx sent in but pt did not pick this up. He doesn't think he was called about this    Remains on levothyroxine 125     PLAN:     new Rx for [place] that Dr. Elizalde sent in - 100 mcg daily    I'll let Dr. Elizalde know as labs may need to be postponed    I have reviewed the note as documented above.  This accurately captures the substance of my conversation with the patient.        Phone call contact time  Call Started at 1219  Call Ended at 1246  (27 minutes)    Please do not hesitate to contact me if you have any questions/concerns.     Sincerely,     Rebekah Marrero PA-C

## 2020-05-18 NOTE — PATIENT INSTRUCTIONS
Dayday - it was nice to speak with you today!    1. Your ZioPatch 4/21-4/28/2020 looked great! No AFib!    Continue Eliquis 5 mg twice a day and metoprolol tartrate 25 mg twice a day    2. Discussed your thyroid supplement. Dr. Elizalde sent a new Rx for levothyroxine 100 mcg daily (instead of 125 mcg) to [facility] 4/14/2020.  You've still been taking the 125 mcgs    new dose and start levothyroxine 100 daily. STOP the 125 mcgs   I'll let Dr. Elizalde know so he can order follow-up/labs as he sees fit    3. As BP is getting into 140s, RESTART lisinopril 30 mg daily.      4. See me 8/2020 and we'll get an EKG at that time. CALL if issues with AFib or BP prior! [phone] (Tatiana Chin Kathy).  Call [phone] to set this appointment up in 8/2020.    Thanks -   Joleen Marrero PA-C

## 2020-05-18 NOTE — PROGRESS NOTES
Juancarlos lam had a phone visit with Dayday.     He was not aware that you recommended he change the dose of levothyroxine to 100 mcg from 125 mcg on 4/14/2020. He's still taking the 125. Unsure if he didn't get called? Or forgot??    He's feeling better from a palpitation perspective.     1) I asked him to  the 100 mcg strength you ordered  2) I told him you may want to adjust timing of repeat labs and that I'd let you know.    From EP standpoint, will be seeing me in 3 m.    Vinnie Goodrich

## 2020-06-08 DIAGNOSIS — E11.9 TYPE 2 DIABETES MELLITUS WITHOUT COMPLICATION, WITH LONG-TERM CURRENT USE OF INSULIN (H): ICD-10-CM

## 2020-06-08 DIAGNOSIS — Z79.4 TYPE 2 DIABETES MELLITUS WITHOUT COMPLICATION, WITH LONG-TERM CURRENT USE OF INSULIN (H): ICD-10-CM

## 2020-06-20 ENCOUNTER — OFFICE VISIT (OUTPATIENT)
Dept: URGENT CARE | Facility: URGENT CARE | Age: 80
End: 2020-06-20
Payer: COMMERCIAL

## 2020-06-20 ENCOUNTER — ANCILLARY PROCEDURE (OUTPATIENT)
Dept: GENERAL RADIOLOGY | Facility: CLINIC | Age: 80
End: 2020-06-20
Attending: FAMILY MEDICINE
Payer: COMMERCIAL

## 2020-06-20 VITALS
HEART RATE: 81 BPM | TEMPERATURE: 99.1 F | SYSTOLIC BLOOD PRESSURE: 106 MMHG | DIASTOLIC BLOOD PRESSURE: 60 MMHG | OXYGEN SATURATION: 99 % | RESPIRATION RATE: 19 BRPM

## 2020-06-20 DIAGNOSIS — S99.911A ANKLE INJURY, RIGHT, INITIAL ENCOUNTER: ICD-10-CM

## 2020-06-20 DIAGNOSIS — S82.891A ANKLE FRACTURE, RIGHT, CLOSED, INITIAL ENCOUNTER: Primary | ICD-10-CM

## 2020-06-20 PROCEDURE — 99214 OFFICE O/P EST MOD 30 MIN: CPT | Performed by: FAMILY MEDICINE

## 2020-06-20 PROCEDURE — 73610 X-RAY EXAM OF ANKLE: CPT | Mod: RT

## 2020-06-20 NOTE — PROGRESS NOTES
SUBJECTIVE:  Chief Complaint   Patient presents with     Urgent Care     right ankle/foot pain since yesterday.    .ident presents with a chief complaint of right ankle.  The injury occurred 1 day ago.   The injury happened while while walking.   How: trauma:  immediate pain  The patient complained of moderate pain and has had decreased ROM.    Pain exacerbated by weight-bearing and movement    He treated it initially with no therapy.   This is the first time this type of injury has occurred to this patient.     Past Medical History:   Diagnosis Date     Adhesive capsulitis of shoulder      Anemia 3/10/2014     Anemia, unspecified      Antiplatelet or antithrombotic long-term use      Arrhythmia     Atrial fib/flutter     CAD (coronary artery disease)      History of cardioversion 04/24/2018    a single synchronized shock of 120 joules restored normal sinus rhythm     History of cardioversion 02/13/2019    single shock , 200 joules successul in restoring NSR     Hyperlipidemia LDL goal <70 5/26/2011     Hypertension      Hypothyroidism      Impotence of organic origin      Laceration of finger  June 2010     left thumb s/p sutures     Numbness and tingling     diabetic neuropathy bilateral feet     BRANDON (obstructive sleep apnea)     intolerant of CPAP, uses it occasionally.  Using mandibular device occasionally     Osteopenia      Pulmonary hypertension (H) 4/6/2012     Sensorineural hearing loss, unspecified      Stented coronary artery 1997    CABG      Testosterone deficiency      Type 2 diabetes mellitus without complication  (goal A1C<8) 10/24/2015     Typical atrial flutter (H) 4/18/16     Unspecified hypothyroidism      Vitamin D deficiency 9/3/2011     Allergies   Allergen Reactions     Omeprazole      diarrhea     Social History     Tobacco Use     Smoking status: Former Smoker     Packs/day: 1.00     Years: 6.00     Pack years: 6.00     Types: Cigarettes     Start date: 1958     Last attempt to quit:  1964     Years since quittin.0     Smokeless tobacco: Never Used   Substance Use Topics     Alcohol use: Yes     Comment: couple drinks a year       ROSINTEGUMENTARY/SKIN: NEGATIVE for open wound/bleeding and NEGATIVE for bruising  MUSCULOSKELETAL: NEGATIVE for joint swelling, paresthesias, radicular pain    EXAM: /60   Pulse 81   Temp 99.1  F (37.3  C) (Tympanic)   Resp 19   SpO2 99% Gen: healthy,alert,no distress  Extremity: ankle has pain with palpation and rom.   There is not compromise to the distal circulation.  Pulses are +2 and CRT is brisk.GENERAL APPEARANCE: healthy, alert and no distress  EXTREMITIES: peripheral pulses normal  SKIN: no suspicious lesions or rashes  NEURO: Normal strength and tone, sensory exam grossly normal, mentation intact and speech normal    Xray without acute findings, fx distal fibula read by Aníbal Madison D.O.      ICD-10-CM    1. Ankle fracture, right, closed, initial encounter  S82.891A order for DME     [facility]  Referral   2. Ankle injury, right, initial encounter  S99.911A XR Ankle Right G/E 3 Views     order for DME     [facility] Sp[facility]  Referral     RICE

## 2020-06-20 NOTE — PATIENT INSTRUCTIONS
Patient Education     Leg Fracture    You have a break (fracture) of the leg. A fracture is treated with a splint, cast, or special boot. It will usually take at about 8 to 12 weeks for the fracture to heal, but it can take several months in some cases. If you have a severe injury, you may need surgery to fix it.  Home care  Follow these guidelines when caring for yourself at home:    You will be given a splint, cast, boot, or other device to keep the injured area from moving. Unless you were told otherwise, use crutches or a walker. Don t put weight on the injured leg until your healthcare provider says you can do so. (You can rent crutches and a walker at many pharmacies and surgical or orthopedic supply stores.)    Keep your leg elevated to reduce pain and swelling. When sleeping, put a pillow under the injured leg. When sitting, support the injured leg so it is above your waist. This is very important during the first 2 days (48 hours).    Put an ice pack on the injured area. Do this for 20 minutes every 1 to 2 hours the first day for pain relief. You can make an ice pack by wrapping a plastic bag of ice cubes in a thin towel. As the ice melts, be careful that the cast, splint, or boot doesn t get wet. You can put the ice pack directly over the splint or cast. Continue using the ice pack 3 to 4 times a day for the next 2 days. Then use the ice pack as needed to ease pain and swelling.    Keep the cast, splint, or boot completely dry at all times. Bathe with your cast, splint, or boot out of the water. Protect it with a large plastic bag, rubber-banded at the top end. If a boot or fiberglass cast or splint gets wet, you can dry it with a hair dryer.    You may use acetaminophen or ibuprofen to control pain, unless another pain medicine was prescribed. If you have chronic liver or kidney disease, talk with your healthcare provider before using these medicines. Also talk with your provider if you ve had a stomach  ulcer or gastrointestinal bleeding.    Don t put creams or objects under the cast if you have itching.  Follow-up care  Follow up with your healthcare provider, or as advised. This is to make sure the bone is healing the way it should. If a splint was put on, it may be converted to a cast at your next visit.  X-rays may be taken. You will be told of any new findings that may affect your care.  When to seek medical advice  Call your healthcare provider right away if any of these occur:    The cast or splint cracks    The plaster cast or splint becomes wet or soft    The fiberglass cast or splint stays wet for more than 24 hours    Bad odor from the cast or wound fluid stains the cast    Tightness or pain under the cast or splint gets worse    Toes become swollen, cold, blue, numb, or tingly    You can t move your toes    Skin around cast or splint becomes red    Fever of 100.4 F (38 C) or higher, or as directed by your healthcare provider  Date Last Reviewed: 2/1/2017 2000-2019 [employer]. [address] [place] [address], [place] [address]. All rights reserved. This information is not intended as a substitute for professional medical care. Always follow your healthcare professional's instructions.

## 2020-07-14 DIAGNOSIS — I48.3 TYPICAL ATRIAL FLUTTER (H): ICD-10-CM

## 2020-08-12 DIAGNOSIS — I10 BENIGN ESSENTIAL HYPERTENSION: ICD-10-CM

## 2020-08-12 RX ORDER — METOPROLOL TARTRATE 25 MG/1
25 TABLET, FILM COATED ORAL 2 TIMES DAILY
Qty: 180 TABLET | Refills: 0 | Status: SHIPPED | OUTPATIENT
Start: 2020-08-12 | End: 2020-11-10

## 2020-09-08 DIAGNOSIS — E11.9 TYPE 2 DIABETES MELLITUS WITHOUT COMPLICATION, WITH LONG-TERM CURRENT USE OF INSULIN (H): ICD-10-CM

## 2020-09-08 DIAGNOSIS — R60.9 EDEMA, UNSPECIFIED TYPE: ICD-10-CM

## 2020-09-08 DIAGNOSIS — Z79.4 TYPE 2 DIABETES MELLITUS WITHOUT COMPLICATION, WITH LONG-TERM CURRENT USE OF INSULIN (H): ICD-10-CM

## 2020-09-10 RX ORDER — FUROSEMIDE 20 MG
TABLET ORAL
Qty: 90 TABLET | Refills: 1 | Status: SHIPPED | OUTPATIENT
Start: 2020-09-10 | End: 2021-03-09

## 2020-09-15 ENCOUNTER — MYC MEDICAL ADVICE (OUTPATIENT)
Dept: INTERNAL MEDICINE | Facility: CLINIC | Age: 80
End: 2020-09-15

## 2020-09-15 DIAGNOSIS — E11.9 TYPE 2 DIABETES MELLITUS WITHOUT COMPLICATION, WITH LONG-TERM CURRENT USE OF INSULIN (H): Primary | ICD-10-CM

## 2020-09-15 DIAGNOSIS — Z79.4 TYPE 2 DIABETES MELLITUS WITHOUT COMPLICATION, WITH LONG-TERM CURRENT USE OF INSULIN (H): Primary | ICD-10-CM

## 2020-09-21 DIAGNOSIS — K21.9 GASTROESOPHAGEAL REFLUX DISEASE WITHOUT ESOPHAGITIS: ICD-10-CM

## 2020-09-22 RX ORDER — FAMOTIDINE 20 MG/1
40 TABLET, FILM COATED ORAL 2 TIMES DAILY
Qty: 180 TABLET | Refills: 3 | Status: SHIPPED | OUTPATIENT
Start: 2020-09-22 | End: 2020-12-22

## 2020-09-25 DIAGNOSIS — E78.5 HYPERLIPIDEMIA LDL GOAL <70: ICD-10-CM

## 2020-09-25 DIAGNOSIS — E11.9 TYPE 2 DIABETES MELLITUS WITHOUT COMPLICATION, WITH LONG-TERM CURRENT USE OF INSULIN (H): ICD-10-CM

## 2020-09-25 DIAGNOSIS — K75.9 HEPATITIS: ICD-10-CM

## 2020-09-25 DIAGNOSIS — Z79.4 TYPE 2 DIABETES MELLITUS WITHOUT COMPLICATION, WITH LONG-TERM CURRENT USE OF INSULIN (H): ICD-10-CM

## 2020-09-25 DIAGNOSIS — E03.9 HYPOTHYROIDISM, UNSPECIFIED TYPE: ICD-10-CM

## 2020-09-25 LAB
ALBUMIN SERPL-MCNC: 3.9 G/DL (ref 3.4–5)
ALP SERPL-CCNC: 121 U/L (ref 40–150)
ALT SERPL W P-5'-P-CCNC: 48 U/L (ref 0–70)
ANION GAP SERPL CALCULATED.3IONS-SCNC: 7 MMOL/L (ref 3–14)
AST SERPL W P-5'-P-CCNC: 28 U/L (ref 0–45)
BILIRUB SERPL-MCNC: 1.1 MG/DL (ref 0.2–1.3)
BUN SERPL-MCNC: 19 MG/DL (ref 7–30)
CALCIUM SERPL-MCNC: 10.3 MG/DL (ref 8.5–10.1)
CHLORIDE SERPL-SCNC: 104 MMOL/L (ref 94–109)
CHOLEST SERPL-MCNC: 235 MG/DL
CO2 SERPL-SCNC: 24 MMOL/L (ref 20–32)
CREAT SERPL-MCNC: 1.22 MG/DL (ref 0.66–1.25)
GFR SERPL CREATININE-BSD FRML MDRD: 56 ML/MIN/{1.73_M2}
GLUCOSE SERPL-MCNC: 212 MG/DL (ref 70–99)
HBA1C MFR BLD: 7.9 % (ref 0–5.6)
HDLC SERPL-MCNC: 46 MG/DL
LDLC SERPL CALC-MCNC: ABNORMAL MG/DL
LDLC SERPL DIRECT ASSAY-MCNC: 82 MG/DL
NONHDLC SERPL-MCNC: 189 MG/DL
POTASSIUM SERPL-SCNC: 4.5 MMOL/L (ref 3.4–5.3)
PROT SERPL-MCNC: 7.2 G/DL (ref 6.8–8.8)
SODIUM SERPL-SCNC: 135 MMOL/L (ref 133–144)
TRIGL SERPL-MCNC: 544 MG/DL
TSH SERPL DL<=0.005 MIU/L-ACNC: 3.01 MU/L (ref 0.4–4)

## 2020-09-25 PROCEDURE — 80061 LIPID PANEL: CPT | Performed by: INTERNAL MEDICINE

## 2020-09-25 PROCEDURE — 83036 HEMOGLOBIN GLYCOSYLATED A1C: CPT | Performed by: INTERNAL MEDICINE

## 2020-09-25 PROCEDURE — 83721 ASSAY OF BLOOD LIPOPROTEIN: CPT | Mod: 59 | Performed by: INTERNAL MEDICINE

## 2020-09-25 PROCEDURE — 80053 COMPREHEN METABOLIC PANEL: CPT | Performed by: INTERNAL MEDICINE

## 2020-09-25 PROCEDURE — 36415 COLL VENOUS BLD VENIPUNCTURE: CPT | Performed by: INTERNAL MEDICINE

## 2020-09-25 PROCEDURE — 84443 ASSAY THYROID STIM HORMONE: CPT | Performed by: INTERNAL MEDICINE

## 2020-10-05 DIAGNOSIS — E11.9 TYPE 2 DIABETES MELLITUS WITHOUT COMPLICATION, WITH LONG-TERM CURRENT USE OF INSULIN (H): ICD-10-CM

## 2020-10-05 DIAGNOSIS — Z79.4 TYPE 2 DIABETES MELLITUS WITHOUT COMPLICATION, WITH LONG-TERM CURRENT USE OF INSULIN (H): ICD-10-CM

## 2020-10-05 NOTE — LETTER
[facility]  [address] [id number] [phone]            Pascual Perea  [address]  [facility] [address]        October 6, 2020    Dear Dayday,    While refilling your prescription today, we noticed that you are due for an appointment with your provider, this can be done in person or virtually.  We will refill your prescription for 30 days, but a follow-up appointment must be made before any additional refills can be approved.     Taking care of your health is important to us and we look forward to seeing you in the near future.  Please call us at [phone] or [phone] (or use [email or web address]) to schedule an appointment.     Please disregard this notice if you have already made an appointment.    Sincerely,        [facility]

## 2020-10-06 RX ORDER — BLOOD GLUCOSE CONTROL HIGH,LOW
EACH MISCELLANEOUS
Qty: 1 EACH | Refills: 0 | Status: SHIPPED | OUTPATIENT
Start: 2020-10-06

## 2020-11-03 DIAGNOSIS — I48.0 PAROXYSMAL ATRIAL FIBRILLATION (H): ICD-10-CM

## 2020-11-03 RX ORDER — LISINOPRIL 30 MG/1
30 TABLET ORAL DAILY
Qty: 90 TABLET | Refills: 2 | Status: SHIPPED | OUTPATIENT
Start: 2020-11-03 | End: 2021-08-06

## 2020-11-10 DIAGNOSIS — I10 BENIGN ESSENTIAL HYPERTENSION: ICD-10-CM

## 2020-11-10 RX ORDER — METOPROLOL TARTRATE 25 MG/1
25 TABLET, FILM COATED ORAL 2 TIMES DAILY
Qty: 180 TABLET | Refills: 0 | Status: SHIPPED | OUTPATIENT
Start: 2020-11-10 | End: 2021-02-09

## 2020-11-11 ENCOUNTER — DOCUMENTATION ONLY (OUTPATIENT)
Dept: LAB | Facility: CLINIC | Age: 80
End: 2020-11-11

## 2020-11-11 DIAGNOSIS — E83.52 HYPERCALCEMIA: Primary | ICD-10-CM

## 2020-11-11 NOTE — PROGRESS NOTES
Labs ordered  Pt also had recent labs in September  Needs f/u with me in clinic to review worsening diabetes blood sugar control, blood pressure status and other medical issues. Call pt and assist with scheduling appt with me. Check blood sugars four times a day (before meals and bedtime) for the 4 days prior to the appointment with me,  write them down and have them available to review with the appointment

## 2020-11-13 DIAGNOSIS — E83.52 HYPERCALCEMIA: ICD-10-CM

## 2020-11-13 LAB
ANION GAP SERPL CALCULATED.3IONS-SCNC: 2 MMOL/L (ref 3–14)
BUN SERPL-MCNC: 20 MG/DL (ref 7–30)
CALCIUM SERPL-MCNC: 10.2 MG/DL (ref 8.5–10.1)
CHLORIDE SERPL-SCNC: 104 MMOL/L (ref 94–109)
CO2 SERPL-SCNC: 29 MMOL/L (ref 20–32)
CREAT SERPL-MCNC: 1.16 MG/DL (ref 0.66–1.25)
GFR SERPL CREATININE-BSD FRML MDRD: 59 ML/MIN/{1.73_M2}
GLUCOSE SERPL-MCNC: 321 MG/DL (ref 70–99)
POTASSIUM SERPL-SCNC: 4.8 MMOL/L (ref 3.4–5.3)
PTH-INTACT SERPL-MCNC: 97 PG/ML (ref 18–80)
SODIUM SERPL-SCNC: 136 MMOL/L (ref 133–144)

## 2020-11-13 PROCEDURE — 82306 VITAMIN D 25 HYDROXY: CPT | Performed by: INTERNAL MEDICINE

## 2020-11-13 PROCEDURE — 36415 COLL VENOUS BLD VENIPUNCTURE: CPT | Performed by: INTERNAL MEDICINE

## 2020-11-13 PROCEDURE — 80048 BASIC METABOLIC PNL TOTAL CA: CPT | Performed by: INTERNAL MEDICINE

## 2020-11-13 PROCEDURE — 83970 ASSAY OF PARATHORMONE: CPT | Performed by: INTERNAL MEDICINE

## 2020-11-16 ENCOUNTER — HEALTH MAINTENANCE LETTER (OUTPATIENT)
Age: 80
End: 2020-11-16

## 2020-11-16 LAB — DEPRECATED CALCIDIOL+CALCIFEROL SERPL-MC: 44 UG/L (ref 20–75)

## 2020-11-19 ENCOUNTER — OFFICE VISIT (OUTPATIENT)
Dept: INTERNAL MEDICINE | Facility: CLINIC | Age: 80
End: 2020-11-19
Payer: COMMERCIAL

## 2020-11-19 VITALS
TEMPERATURE: 98.1 F | HEIGHT: 71 IN | OXYGEN SATURATION: 100 % | SYSTOLIC BLOOD PRESSURE: 126 MMHG | DIASTOLIC BLOOD PRESSURE: 78 MMHG | BODY MASS INDEX: 28.56 KG/M2 | WEIGHT: 204 LBS | RESPIRATION RATE: 16 BRPM | HEART RATE: 74 BPM

## 2020-11-19 DIAGNOSIS — E11.9 TYPE 2 DIABETES MELLITUS WITHOUT COMPLICATION, WITH LONG-TERM CURRENT USE OF INSULIN (H): ICD-10-CM

## 2020-11-19 DIAGNOSIS — N18.31 STAGE 3A CHRONIC KIDNEY DISEASE (H): ICD-10-CM

## 2020-11-19 DIAGNOSIS — E78.5 HYPERLIPIDEMIA LDL GOAL <70: Primary | ICD-10-CM

## 2020-11-19 DIAGNOSIS — E21.3 HYPERPARATHYROIDISM (H): ICD-10-CM

## 2020-11-19 DIAGNOSIS — Z79.4 TYPE 2 DIABETES MELLITUS WITHOUT COMPLICATION, WITH LONG-TERM CURRENT USE OF INSULIN (H): ICD-10-CM

## 2020-11-19 PROCEDURE — 99214 OFFICE O/P EST MOD 30 MIN: CPT | Performed by: INTERNAL MEDICINE

## 2020-11-19 RX ORDER — ATORVASTATIN CALCIUM 40 MG/1
40 TABLET, FILM COATED ORAL DAILY
Qty: 30 TABLET | Refills: 11 | Status: SHIPPED | OUTPATIENT
Start: 2020-11-19 | End: 2021-11-23

## 2020-11-19 ASSESSMENT — MIFFLIN-ST. JEOR: SCORE: 1662.47

## 2020-11-19 NOTE — PATIENT INSTRUCTIONS
Continue Levemir 70 units daily  at bedtime  Add 1 unit consistently to usual AM Novolog dose for breakfast  Besides usual dose of Novolog for certain  Amount of carbs, add 1 unit per 50 points of blood sugar above 150  If certain meals always  50+ points above 150, then add 1 unit to this meals  longterm   Stop Pravastatin   Start Atorvastatin 40mg tab, 1 tab daily for cholesterol. Take PM  Call  [phone] or use [employer] to schedule a future lab appointment  fasting in early January 2021  For fasting labs, please refrain from eating for 8 hours or more.   Drink 2 glasses of water before your lab appointment. It is fine to take your  oral medications on the morning of the lab test as usual  Continue other medications

## 2020-11-19 NOTE — PROGRESS NOTES
Subjective     Pascual Perea is a 80 year old male who presents to clinic today for the following health issues:    HPI         Diabetes Follow-up    How often are you checking your blood sugar? Four or more times daily  Blood sugar testing frequency justification:  Provider's Request  What time of day are you checking your blood sugars (select all that apply)?  Before Meals and At bedtime  Have you had any blood sugars above 200?  Yes varies-mostly in the evening  Have you had any blood sugars below 70?  No    What symptoms do you notice when your blood sugar is low?  None    What concerns do you have today about your diabetes? Blood sugar is often over 200     Do you have any of these symptoms? (Select all that apply)  Numbness in feet              Hyperlipidemia Follow-Up      Are you regularly taking any medication or supplement to lower your cholesterol?   Yes- Pravastatin    Are you having muscle aches or other side effects that you think could be caused by your cholesterol lowering medication?  No    Hypertension Follow-up      Do you check your blood pressure regularly outside of the clinic? Yes     Are you following a low salt diet? Yes    Are your blood pressures ever more than 140 on the top number (systolic) OR more   than 90 on the bottom number (diastolic), for example 140/90? No    BP Readings from Last 2 Encounters:   06/20/20 106/60   05/18/20 138/65     Hemoglobin A1C (%)   Date Value   09/25/2020 7.9 (H)   04/13/2020 6.9 (H)     LDL Cholesterol Calculated (mg/dL)   Date Value   09/25/2020     Cannot estimate LDL when triglyceride exceeds 400 mg/dL   04/13/2020     Cannot estimate LDL when triglyceride exceeds 400 mg/dL     LDL Cholesterol Direct (mg/dL)   Date Value   09/25/2020 82   04/13/2020 139 (H)         How many servings of fruits and vegetables do you eat daily?  2-3    On average, how many sweetened beverages do you drink each day (Examples: soda, juice, sweet tea, etc.  Do NOT count  diet or artificially sweetened beverages)?   0    How many days per week do you exercise enough to make your heart beat faster? 3 or less    How many minutes a day do you exercise enough to make your heart beat faster? 9 or less    How many days per week do you miss taking your medication? 0      Pt's past medical history, family history, habits, medications and allergies were reviewed with the patient today.  See snap shot for  HCM status. Most recent lab results reviewed with pt. Problem list and histories reviewed & adjusted, as indicated.  Additional history as below:      Sugars:  218,253,199,205  158,194,1965,270  192,265,143,197  183,254,340,241  131,195    Trying to follow DM diet.  Denies CP, SOB, abdominal pain, polyuria, polydipsia,   extremity numbness/parasthesias or skin problems.   Occ floaters in vision that are chronic. Last eye exam Feb 2020. Told t monitor things at that time  Levemir at bedxtime. Took 70 units   last night. Kevin been in the 60s before then  Novolog 3 unit/CC and taking 4 CC per meal so 12- 15 units    Component      Latest Ref Rng & Units 12/30/2019 4/13/2020   WBC      4.0 - 11.0 10e9/L  4.3   RBC Count      4.4 - 5.9 10e12/L  4.46   Hemoglobin      13.3 - 17.7 g/dL  13.4   Hematocrit      40.0 - 53.0 %  38.8 (L)   MCV      78 - 100 fl  87   MCH      26.5 - 33.0 pg  30.0   MCHC      31.5 - 36.5 g/dL  34.5   RDW      10.0 - 15.0 %  13.3   Platelet Count      150 - 450 10e9/L  167   Diff Method        Automated Method   % Neutrophils      %  60.2   % Lymphocytes      %  28.7   % Monocytes      %  6.9   % Eosinophils      %  2.8   % Basophils      %  1.2   % Immature Granulocytes      %  0.2   Nucleated RBCs      0 /100  0   Absolute Neutrophil      1.6 - 8.3 10e9/L  2.6   Absolute Lymphocytes      0.8 - 5.3 10e9/L  1.2   Absolute Monocytes      0.0 - 1.3 10e9/L  0.3   Absolute Eosinophils      0.0 - 0.7 10e9/L  0.1   Absolute Basophils      0.0 - 0.2 10e9/L  0.1   Abs Immature  Granulocytes      0 - 0.4 10e9/L  0.0   Absolute Nucleated RBC        0.0   Sodium      133 - 144 mmol/L  138   Potassium      3.4 - 5.3 mmol/L  4.1   Chloride      94 - 109 mmol/L  105   Carbon Dioxide      20 - 32 mmol/L  29   Anion Gap      3 - 14 mmol/L  4   Glucose      70 - 99 mg/dL  227 (H)   Urea Nitrogen      7 - 30 mg/dL  20   Creatinine      0.66 - 1.25 mg/dL  1.12   GFR Estimate      >60 mL/min/1.73:m2  62   GFR Estimate If Black      >60 mL/min/1.73:m2  72   Calcium      8.5 - 10.1 mg/dL  10.2 (H)   Bilirubin Total      0.2 - 1.3 mg/dL  0.6   Albumin      3.4 - 5.0 g/dL  4.0   Protein Total      6.8 - 8.8 g/dL  7.2   Alkaline Phosphatase      40 - 150 U/L  121   ALT      0 - 70 U/L  78 (H)   AST      0 - 45 U/L  33   Cholesterol      <200 mg/dL     Triglycerides      <150 mg/dL     HDL Cholesterol      >39 mg/dL     LDL Cholesterol Calculated      <100 mg/dL     Non HDL Cholesterol      <130 mg/dL     PSA      0 - 4 ug/L 0.48    Hemoglobin A1C      0 - 5.6 %  6.9 (H)   TSH      0.40 - 4.00 mU/L     LDL Cholesterol Direct      <100 mg/dL     Vitamin D Deficiency screening      20 - 75 ug/L     Parathyroid Hormone Intact      18 - 80 pg/mL       Component      Latest Ref Rng & Units 9/25/2020   WBC      4.0 - 11.0 10e9/L    RBC Count      4.4 - 5.9 10e12/L    Hemoglobin      13.3 - 17.7 g/dL    Hematocrit      40.0 - 53.0 %    MCV      78 - 100 fl    MCH      26.5 - 33.0 pg    MCHC      31.5 - 36.5 g/dL    RDW      10.0 - 15.0 %    Platelet Count      150 - 450 10e9/L    Diff Method          % Neutrophils      %    % Lymphocytes      %    % Monocytes      %    % Eosinophils      %    % Basophils      %    % Immature Granulocytes      %    Nucleated RBCs      0 /100    Absolute Neutrophil      1.6 - 8.3 10e9/L    Absolute Lymphocytes      0.8 - 5.3 10e9/L    Absolute Monocytes      0.0 - 1.3 10e9/L    Absolute Eosinophils      0.0 - 0.7 10e9/L    Absolute Basophils      0.0 - 0.2 10e9/L    Abs Immature  Granulocytes      0 - 0.4 10e9/L    Absolute Nucleated RBC          Sodium      133 - 144 mmol/L 135   Potassium      3.4 - 5.3 mmol/L 4.5   Chloride      94 - 109 mmol/L 104   Carbon Dioxide      20 - 32 mmol/L 24   Anion Gap      3 - 14 mmol/L 7   Glucose      70 - 99 mg/dL 212 (H)   Urea Nitrogen      7 - 30 mg/dL 19   Creatinine      0.66 - 1.25 mg/dL 1.22   GFR Estimate      >60 mL/min/1.73:m2 56 (L)   GFR Estimate If Black      >60 mL/min/1.73:m2 65   Calcium      8.5 - 10.1 mg/dL 10.3 (H)   Bilirubin Total      0.2 - 1.3 mg/dL 1.1   Albumin      3.4 - 5.0 g/dL 3.9   Protein Total      6.8 - 8.8 g/dL 7.2   Alkaline Phosphatase      40 - 150 U/L 121   ALT      0 - 70 U/L 48   AST      0 - 45 U/L 28   Cholesterol      <200 mg/dL 235 (H)   Triglycerides      <150 mg/dL 544 (H)   HDL Cholesterol      >39 mg/dL 46   LDL Cholesterol Calculated      <100 mg/dL Cannot estimate LDL when triglyceride exceeds 400 mg/dL   Non HDL Cholesterol      <130 mg/dL 189 (H)   PSA      0 - 4 ug/L    Hemoglobin A1C      0 - 5.6 % 7.9 (H)   TSH      0.40 - 4.00 mU/L 3.01   LDL Cholesterol Direct      <100 mg/dL 82   Vitamin D Deficiency screening      20 - 75 ug/L    Parathyroid Hormone Intact      18 - 80 pg/mL      Component      Latest Ref Rng & Units 11/13/2020   WBC      4.0 - 11.0 10e9/L    RBC Count      4.4 - 5.9 10e12/L    Hemoglobin      13.3 - 17.7 g/dL    Hematocrit      40.0 - 53.0 %    MCV      78 - 100 fl    MCH      26.5 - 33.0 pg    MCHC      31.5 - 36.5 g/dL    RDW      10.0 - 15.0 %    Platelet Count      150 - 450 10e9/L    Diff Method          % Neutrophils      %    % Lymphocytes      %    % Monocytes      %    % Eosinophils      %    % Basophils      %    % Immature Granulocytes      %    Nucleated RBCs      0 /100    Absolute Neutrophil      1.6 - 8.3 10e9/L    Absolute Lymphocytes      0.8 - 5.3 10e9/L    Absolute Monocytes      0.0 - 1.3 10e9/L    Absolute Eosinophils      0.0 - 0.7 10e9/L    Absolute  "Basophils      0.0 - 0.2 10e9/L    Abs Immature Granulocytes      0 - 0.4 10e9/L    Absolute Nucleated RBC          Sodium      133 - 144 mmol/L 136   Potassium      3.4 - 5.3 mmol/L 4.8   Chloride      94 - 109 mmol/L 104   Carbon Dioxide      20 - 32 mmol/L 29   Anion Gap      3 - 14 mmol/L 2 (L)   Glucose      70 - 99 mg/dL 321 (H)   Urea Nitrogen      7 - 30 mg/dL 20   Creatinine      0.66 - 1.25 mg/dL 1.16   GFR Estimate      >60 mL/min/1.73:m2 59 (L)   GFR Estimate If Black      >60 mL/min/1.73:m2 69   Calcium      8.5 - 10.1 mg/dL 10.2 (H)   Bilirubin Total      0.2 - 1.3 mg/dL    Albumin      3.4 - 5.0 g/dL    Protein Total      6.8 - 8.8 g/dL    Alkaline Phosphatase      40 - 150 U/L    ALT      0 - 70 U/L    AST      0 - 45 U/L    Cholesterol      <200 mg/dL    Triglycerides      <150 mg/dL    HDL Cholesterol      >39 mg/dL    LDL Cholesterol Calculated      <100 mg/dL    Non HDL Cholesterol      <130 mg/dL    PSA      0 - 4 ug/L    Hemoglobin A1C      0 - 5.6 %    TSH      0.40 - 4.00 mU/L    LDL Cholesterol Direct      <100 mg/dL    Vitamin D Deficiency screening      20 - 75 ug/L 44   Parathyroid Hormone Intact      18 - 80 pg/mL 97 (H)            Additional ROS:   Constitutional, HEENT, Cardiovascular, Pulmonary, GI and , Neuro, MSK and Psych review of systems/symptoms are otherwise negative or unchanged from previous, except as noted above.      OBJECTIVE:  /78   Pulse 74   Temp 98.1  F (36.7  C) (Tympanic)   Resp 16   Ht 1.803 m (5' 11\")   Wt 92.5 kg (204 lb)   SpO2 100%   BMI 28.45 kg/m     Estimated body mass index is 28.45 kg/m  as calculated from the following:    Height as of this encounter: 1.803 m (5' 11\").    Weight as of this encounter: 92.5 kg (204 lb).     Neck: no adenopathy. Thyroid normal to palpation. No bruits  Pulm: Lungs clear to auscultation   CV: Regular rates and rhythm  GI: Soft, nontender, Normal active bowel sounds, No hepatosplenomegaly or masses palpable  Ext: " Peripheral pulses intact. No edema.  Neuro: Normal strength and tone, sensory exam grossly normal        Assessment/Plan: (See plan discussion below for further details)  1. Type 2 diabetes mellitus without complication, with long-term current use of insulin (H)  Needs improved control. Will adjust Novolog as below. Repeat A1C 3 mos  - insulin detemir (LEVEMIR FLEXPEN/FLEXTOUCH) 100 UNIT/ML pen; Inject 70 Units Subcutaneous At Bedtime  Dispense: 75 mL; Refill: 3  - Hemoglobin A1c; Future    2. Hyperlipidemia LDL goal <70   Uncontrolled. Stop Pravastaitn and start Atorvastatin. Future lab as ordered 1 month  - atorvastatin (LIPITOR) 40 MG tablet; Take 1 tablet (40 mg) by mouth daily  Dispense: 30 tablet; Refill: 11  - Lipid panel reflex to direct LDL Fasting; Future  - Comprehensive metabolic panel; Future    3. Stage 3a chronic kidney disease   Stable. Future lab as ordered  - Comprehensive metabolic panel; Future    4. Hyperparathyroidism (H)  Minimal calcium elevation, stable. No sx. Repeat PTH level 6 mos. Future CMP recheck 1 month as above  - Comprehensive metabolic panel; Future  - Parathyroid Hormone Intact; Future    Plan discussion:   Continue Levemir 70 units daily  at bedtime  Add 1 unit consistently to usual AM Novolog dose for breakfast  Besides usual dose of Novolog for certain  Amount of carbs, add 1 unit per 50 points of blood sugar above 150  If certain meals always  50+ points above 150, then add 1 unit to this meals  longterm   Stop Pravastatin   Start Atorvastatin 40mg tab, 1 tab daily for cholesterol. Take PM  Call  [phone] or use [employer] to schedule a future lab appointment  fasting in early January 2021  For fasting labs, please refrain from eating for 8 hours or more.   Drink 2 glasses of water before your lab appointment. It is fine to take your  oral medications on the morning of the lab test as usual  Continue other medications    Andrew Elizalde MD  Internal Medicine  [facility]    (Chart documentation was completed, in part, with [employer] voice-recognition software. Even though reviewed, some grammatical, spelling, and word errors may remain.)

## 2020-11-29 PROBLEM — E21.3 HYPERPARATHYROIDISM (H): Status: ACTIVE | Noted: 2020-11-29

## 2020-11-30 DIAGNOSIS — J31.0 CHRONIC RHINITIS: ICD-10-CM

## 2020-12-01 RX ORDER — AZELASTINE 1 MG/ML
SPRAY, METERED NASAL
Qty: 90 ML | Refills: 3 | Status: SHIPPED | OUTPATIENT
Start: 2020-12-01 | End: 2022-03-07

## 2020-12-21 DIAGNOSIS — K21.9 GASTROESOPHAGEAL REFLUX DISEASE WITHOUT ESOPHAGITIS: ICD-10-CM

## 2020-12-22 RX ORDER — FAMOTIDINE 20 MG/1
40 TABLET, FILM COATED ORAL 2 TIMES DAILY
Qty: 180 TABLET | Refills: 2 | Status: SHIPPED | OUTPATIENT
Start: 2020-12-22 | End: 2021-06-21

## 2021-02-09 ENCOUNTER — TELEPHONE (OUTPATIENT)
Dept: CARDIOLOGY | Facility: CLINIC | Age: 81
End: 2021-02-09

## 2021-02-09 DIAGNOSIS — I10 BENIGN ESSENTIAL HYPERTENSION: ICD-10-CM

## 2021-02-09 RX ORDER — METOPROLOL TARTRATE 25 MG/1
25 TABLET, FILM COATED ORAL 2 TIMES DAILY
Qty: 60 TABLET | Refills: 0 | Status: SHIPPED | OUTPATIENT
Start: 2021-02-09 | End: 2021-03-09

## 2021-02-09 NOTE — TELEPHONE ENCOUNTER
02/09/11 MSg received from refill line  Received a refill request from [facility] for metoprolol tartrate 25 mg twice daily.   Is overdue for a follow up. No appointment in place.   Has already been sent a 90 day refill and appointment reminder letter.   Called pt who said he has been feeling well with no problems. Scheduled pt for SILVANA Marrero PA-C on 3/1 at 310 pm. Refill sent for Metoprolol to [facility].  Pt voiced understanding and agreement with plan.   Christian 1123 am

## 2021-02-22 DIAGNOSIS — E11.9 TYPE 2 DIABETES MELLITUS WITHOUT COMPLICATION, WITH LONG-TERM CURRENT USE OF INSULIN (H): ICD-10-CM

## 2021-02-22 DIAGNOSIS — Z79.4 TYPE 2 DIABETES MELLITUS WITHOUT COMPLICATION, WITH LONG-TERM CURRENT USE OF INSULIN (H): ICD-10-CM

## 2021-02-26 NOTE — PROGRESS NOTES
[facility] HEART [facility]      Assessment & Plan   Problem List Items Addressed This Visit     Coronary atherosclerosis    Relevant Orders    Follow-Up with Cardiologist      Other Visit Diagnoses     Other fatigue    -  Primary    Relevant Orders    Follow-Up with Cardiologist    Paroxysmal atrial fibrillation (H)        Relevant Orders    EKG 12-lead complete w/read - Clinics (performed today) (Completed)    SLEEP EVALUATION & MANAGEMENT REFERRAL - [facility] [phone]  (Age 18 and up)    Obstructive sleep apnea syndrome        Relevant Orders    SLEEP EVALUATION & MANAGEMENT REFERRAL - [facility] [phone]  (Age 18 and up)    Chest pain, unspecified type            [facility] HEART [facility] LEXIE    I had the pleasure of seeing Dayday when he came for 3 m follow-up.  This 80 year old sees Dr. Adams and Dr. Jarvis for his history of:    1. Persistent AFib first diagnosed 5/2016, s/p DCCV 2016 and again 4/2018. Started on Amiodarone and underwent DCCV 2/2019 and 5/2019 (after cholecystectomy triggered AFib 3/2019). Dr. Jarvis noted we could proceed with PVI vs DCCV.  Amiodarone stopped 11/2019 due to abnl LFTs.  2. Benign essential hypertension  3. HFpEF, with fluid overload and ascites. EF 55% with mildly decreased RVSF. Low dose Lasix working well  4. CAD s/p CABG 1997. Stress echo 1/2020 during hospitalization 1/2020 was normal.     When I saw Dayday 5/2020, his palpitations had improved.  Zio patch done 4/2020 for complaints of recurrent palpitations had been benign, but he had not had any symptoms while wearing it.  There was some confusion on his levothyroxine dose, as he had not change the dose as recommended by Dr. Elizalde.  He had also stopped his lisinopril.  I had him continue Eliquis and noted he may require PVI if found to have recurrent symptomatic atrial fibrillation.  3-month follow-up was recommended, but we are just now seeing him  "back today.     He last saw Dr. Elizalde 11/2020 at which time pravastatin was stopped and atorvastatin 40 mg daily started.  He was to see him again 1/2021, but I do not see this is been done.    Interval History:  No c/o CP. No SOB. Doesn't exercise much. No palpitations that he's aware of. No dizziness. Notes lightheadedness when stands up quickly.    Notes fatigue intermittently, noting 2 weeks ago he felt really fatigued, napping more and falling asleep with TV but this past week did a lot better. Nothing he can think of that precipitated this. He keep track of blood sugars, BPs and HRs but don't seem to correlate with these episodes of fatigue. Of note, had been dx'd with BRANDON 2010 but wife didn't like how loud the machine was and he didn't like how dry it made his mouth. He's not worn it for almost a decade. Note last echo with decreased RVSF.     BPs at home 120-140s first thing in the morning before medications, typically 130s.     VITALS:  Vitals: /69 (BP Location: Left arm, Patient Position: Sitting, Cuff Size: Adult Regular)   Pulse 83   Ht 1.803 m (5' 11\")   Wt 97 kg (213 lb 14.4 oz)   SpO2 99%   BMI 29.83 kg/m      Diagnostic Testing:  EKG today, which I overread, showed SR 1st degree AV Block 84 bpm  1 week ZioPatch 4/2020 showed SR with avg HR 64 bpm (range  bpm). 1 5-beat run of SVT noted, avg  with rates to 110 bpm @ 04/24 @ ~0900. Asx'c. Rare ectopy noted.  ZioPatch x 2 weeks 1/2020 showed SR (79 bpm, range  bpm) and minimal ectopy <1%. Intermittent Mobitz I noted (1/15 @ 0718; 1/21 @1114), both with sleeping  Stress Echocardiogam 1/2020 was normal  Echocardiogram 4/25/2019 EF 55% with mildly decreased right ventricular systolic function.  Mild aortic stenosis with a mean gradient of 9 mmHg and an aortic valve area 1.7 cm .  1+ mitral regurgitation was noted.  Left atrium was dilated at 4.6 cm with a volume index of 51.8 mL/m .  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 " Repeat sleep eval  2. See us back 6 months (Vats, Jarvis or me)  3. Set up labs Dr. Elizalde requested for 1/2021    Assessment/Plan:    1. H/o Persistent AFib    DCCV 5/2019     Amiodarone stopped 11/2019. Last 2 ZioPatches have shown no recurrence (1/2020, 4/2020)    Remains on Eliquis 5 mg BID for CHADSVASc 5 for CHADSVASc (HFpEF, HTN, DM, CAD, age)     EKG with SR/long first degree                  PLAN:    Continue Eliquis    May require PVI if he's found to have recurrent sx'c AFib    6 m follow-up with EKG    2. Intermittent fatigue    No rhyme/reason for this based on BP, glucose, HR    Has untreated BRANDON    PLAN:    Get back into Sleep    Joleen Marrero PA-C, MSPAS      Orders Placed This Encounter   Procedures     SLEEP EVALUATION & MANAGEMENT REFERRAL - [facility] [phone]  (Age 18 and up)     Follow-Up with Cardiologist     EKG 12-lead complete w/read - Clinics (performed today)     No orders of the defined types were placed in this encounter.    There are no discontinued medications.      Encounter Diagnoses   Name Primary?     Paroxysmal atrial fibrillation (H)      Other fatigue Yes     Obstructive sleep apnea syndrome      Chest pain, unspecified type      Atherosclerosis of native coronary artery of native heart without angina pectoris        CURRENT MEDICATIONS:  Current Outpatient Medications   Medication Sig Dispense Refill     apixaban ANTICOAGULANT (ELIQUIS) 5 MG tablet Take 1 tablet (5 mg) by mouth 2 times daily 180 tablet 3     atorvastatin (LIPITOR) 40 MG tablet Take 1 tablet (40 mg) by mouth daily 30 tablet 11     azelastine (ASTELIN) 0.1 % nasal spray USE 2 SPRAYS INTO BOTH NOSTRILS TWO TIMES A DAY 90 mL 3     blood glucose (NO BRAND SPECIFIED) test strip Use to test blood sugar 4 times daily or as directed. 400 each 1     blood glucose calibration (ACCU-CHEK NORMAN) solution USE AS DIRECTED 1 each 0     Cholecalciferol (VITAMIN D3 PO) Take 5,000 Units by mouth  "daily       famotidine (PEPCID) 20 MG tablet Take 2 tablets (40 mg) by mouth 2 times daily 180 tablet 2     furosemide (LASIX) 20 MG tablet TAKE ONE TABLET BY MOUTH ONCE DAILY 90 tablet 1     glucose 4 g CHEW Take 1 tablet by mouth every hour as needed for low blood sugar       insulin aspart (NOVOLOG FLEXPEN) 100 UNIT/ML pen Inject 3 times daily (before meals). 3 units per carb choice (15gm). Approx 10-15 units/meal. 45 mL 3     insulin detemir (LEVEMIR FLEXPEN/FLEXTOUCH) 100 UNIT/ML pen Inject 70 Units Subcutaneous At Bedtime 75 mL 3     insulin pen needle (NOVOFINE 30) 30G X 8 MM miscellaneous USE AS DIRECTED FOUR TIMES A  each 2     levothyroxine (SYNTHROID/LEVOTHROID) 100 MCG tablet Take 1 tablet (100 mcg) by mouth daily 30 tablet 11     lisinopril (ZESTRIL) 30 MG tablet Take 1 tablet (30 mg) by mouth daily 90 tablet 2     metoprolol tartrate (LOPRESSOR) 25 MG tablet Take 1 tablet (25 mg) by mouth 2 times daily 60 tablet 0     nitroglycerin (NITROSTAT) 0.4 MG SL tablet Place 1 tablet (0.4 mg) under the tongue every 5 minutes as needed for chest pain (Patient not taking: Reported on 3/1/2021) 25 tablet 1       ALLERGIES     Allergies   Allergen Reactions     Omeprazole      diarrhea         Review of Systems:  Skin:  Negative     Eyes:  Positive for glasses  ENT:  Positive for hearing loss  Respiratory:  Negative for dyspnea on exertion;shortness of breath  Cardiovascular:  Negative for;palpitations;chest pain;dizziness;lightheadedness;fatigue    Gastroenterology: Positive for heartburn  Genitourinary:  Negative    Musculoskeletal:  Positive for arthritis;joint pain  Neurologic:  Positive for numbness or tingling of feet  Psychiatric:  Negative    Heme/Lymph/Imm:  Negative    Endocrine:  Positive for thyroid disorder;diabetes    Physical Exam:  Vitals: /69 (BP Location: Left arm, Patient Position: Sitting, Cuff Size: Adult Regular)   Pulse 83   Ht 1.803 m (5' 11\")   Wt 97 kg (213 lb 14.4 oz)   " SpO2 99%   BMI 29.83 kg/m      Constitutional:  cooperative, alert and oriented, well developed, well nourished, in no acute distress        Skin:  warm and dry to the touch, no apparent skin lesions or masses noted        Head:  normocephalic, no masses or lesions        Eyes:  pupils equal and round;conjunctivae and lids unremarkable;sclera white        ENT:  no pallor or cyanosis, dentition good        Neck:  JVP normal;no carotid bruit        Chest:  normal breath sounds, clear to auscultation, normal A-P diameter, normal symmetry, normal respiratory excursion, no use of accessory muscles        Cardiac: regular rhythm, normal S1/S2, no S3 or S4, apical impulse not displaced, no murmurs, gallops or rubs                  Abdomen:  abdomen soft obese      Vascular: pulses full and equal                                      Extremities and Back:  no deformities, clubbing, cyanosis, erythema observed;no edema        Neurological:  no gross motor deficits            PAST MEDICAL HISTORY:  Past Medical History:   Diagnosis Date     Adhesive capsulitis of shoulder      Anemia 3/10/2014     Anemia, unspecified      Antiplatelet or antithrombotic long-term use      Arrhythmia     Atrial fib/flutter     CAD (coronary artery disease)      History of cardioversion 04/24/2018    a single synchronized shock of 120 joules restored normal sinus rhythm     History of cardioversion 02/13/2019    single shock , 200 joules successul in restoring NSR     Hyperlipidemia LDL goal <70 5/26/2011     Hypertension      Hypothyroidism      Impotence of organic origin      Laceration of finger  June 2010     left thumb s/p sutures     Numbness and tingling     diabetic neuropathy bilateral feet     BRANDON (obstructive sleep apnea)     intolerant of CPAP, uses it occasionally.  Using mandibular device occasionally     Osteopenia      Pulmonary hypertension (H) 4/6/2012     Sensorineural hearing loss, unspecified      Stented coronary artery  1997    CABG      Testosterone deficiency      Type 2 diabetes mellitus without complication  (goal A1C<8) 10/24/2015     Typical atrial flutter (H) 4/18/16     Unspecified hypothyroidism      Vitamin D deficiency 9/3/2011       PAST SURGICAL HISTORY:  Past Surgical History:   Procedure Laterality Date     ANESTHESIA CARDIOVERSION N/A 2/13/2019    Procedure: ANESTHESIA CARDIOVERSION;  Surgeon: GENERIC ANESTHESIA PROVIDER;  Location:  OR     ANESTHESIA CARDIOVERSION N/A 5/22/2019    Procedure: ANESTHESIA, FOR CARDIOVERSION;  Surgeon: GENERIC ANESTHESIA PROVIDER;  Location:  OR     CARDIAC SURGERY      bypass in 1997     CARDIOVERSION  04/24/2018     COLONOSCOPY       CORONARY ANGIOGRAPHY ADULT ORDER      4/2/2012     CORONARY ARTERY BYPASS  1997    [facility]     ENDOSCOPIC ULTRASOUND UPPER GASTROINTESTINAL TRACT (GI) N/A 3/14/2019    Procedure: ENDOSCOPIC ULTRASOUND OF UPPER GASTROINTESTINAL TRACT;  Surgeon: Ju Torres MD;  Location:  OR     EXCISE MASS HEAD Left 8/18/2016    Procedure: EXCISE MASS HEAD;  Surgeon: Ivan Hernandez MD;  Location: [facility]     HEART CATH, ANGIOPLASTY  4/2012     LAPAROSCOPIC CHOLECYSTECTOMY N/A 3/17/2019    Procedure: LAPAROSCOPIC CHOLECYSTECTOMY;  Surgeon: Janel Paz MD;  Location:  OR     PHACOEMULSIFICATION CLEAR CORNEA WITH STANDARD INTRAOCULAR LENS IMPLANT Left 6/8/2015    Procedure: PHACOEMULSIFICATION CLEAR CORNEA WITH STANDARD INTRAOCULAR LENS IMPLANT;  Surgeon: Nixon Farnsworth MD;  Location:  [facility]     PHACOEMULSIFICATION CLEAR CORNEA WITH STANDARD INTRAOCULAR LENS IMPLANT Right 6/22/2015    Procedure: PHACOEMULSIFICATION CLEAR CORNEA WITH STANDARD INTRAOCULAR LENS IMPLANT;  Surgeon: Nixon Farnsworth MD;  Location:  [facility]     SEPTOPLASTY, TURBINOPLASTY, COMBINED Bilateral 8/18/2016    Procedure: COMBINED SEPTOPLASTY, TURBINOPLASTY;  Surgeon: Ivan Hernandez MD;  Location: [facility]     ZZC NONSPECIFIC PROCEDURE  1995     CABG ([facility])     [facility] NONSPECIFIC PROCEDURE      stress echo       FAMILY HISTORY:  Family History   Problem Relation Age of Onset     Genitourinary Problems Father         d: age 89; ARF     Hypertension Father      Diabetes Mother         d: age 68, CAD     Heart Disease Mother         MI     Myocardial Infarction Mother      Cardiovascular Brother         d:  age 31; CAD     Heart Disease Brother         age 31, MI     Diabetes Sister         b:  1939; DM2       SOCIAL HISTORY:  Social History     Socioeconomic History     Marital status:      Spouse name: None     Number of children: None     Years of education: None     Highest education level: None   Occupational History     None   Social Needs     Financial resource strain: None     Food insecurity     Worry: None     Inability: None     Transportation needs     Medical: None     Non-medical: None   Tobacco Use     Smoking status: Former Smoker     Packs/day: 1.00     Years: 6.00     Pack years: 6.00     Types: Cigarettes     Start date:      Quit date: 1964     Years since quittin.7     Smokeless tobacco: Never Used   Substance and Sexual Activity     Alcohol use: Yes     Comment: couple drinks a year     Drug use: No     Sexual activity: Yes     Partners: Female   Lifestyle     Physical activity     Days per week: None     Minutes per session: None     Stress: None   Relationships     Social connections     Talks on phone: None     Gets together: None     Attends Christian service: None     Active member of club or organization: None     Attends meetings of clubs or organizations: None     Relationship status: None     Intimate partner violence     Fear of current or ex partner: None     Emotionally abused: None     Physically abused: None     Forced sexual activity: None   Other Topics Concern     Parent/sibling w/ CABG, MI or angioplasty before 65F 55M? Not Asked      Service Not Asked     Blood Transfusions Not Asked      Caffeine Concern Yes     Comment: 2 cups coffee a day     Occupational Exposure Not Asked     Hobby Hazards Not Asked     Sleep Concern Yes     Comment: sleep apnea, wears cpap off and on     Stress Concern No     Weight Concern No     Special Diet Yes     Comment: diabetic diet      Back Care Not Asked     Exercise No     Comment: occ walk the mall     Bike Helmet Not Asked     Seat Belt Yes     Self-Exams Not Asked   Social History Narrative     None

## 2021-03-01 ENCOUNTER — OFFICE VISIT (OUTPATIENT)
Dept: CARDIOLOGY | Facility: CLINIC | Age: 81
End: 2021-03-01
Attending: PHYSICIAN ASSISTANT
Payer: COMMERCIAL

## 2021-03-01 ENCOUNTER — DOCUMENTATION ONLY (OUTPATIENT)
Dept: CARDIOLOGY | Facility: CLINIC | Age: 81
End: 2021-03-01

## 2021-03-01 VITALS
SYSTOLIC BLOOD PRESSURE: 135 MMHG | HEART RATE: 83 BPM | DIASTOLIC BLOOD PRESSURE: 69 MMHG | HEIGHT: 71 IN | WEIGHT: 213.9 LBS | BODY MASS INDEX: 29.94 KG/M2 | OXYGEN SATURATION: 99 %

## 2021-03-01 DIAGNOSIS — R53.83 OTHER FATIGUE: Primary | ICD-10-CM

## 2021-03-01 DIAGNOSIS — I48.91 ATRIAL FIBRILLATION, UNSPECIFIED TYPE (H): Primary | ICD-10-CM

## 2021-03-01 DIAGNOSIS — I48.0 PAROXYSMAL ATRIAL FIBRILLATION (H): ICD-10-CM

## 2021-03-01 DIAGNOSIS — R07.9 CHEST PAIN, UNSPECIFIED TYPE: ICD-10-CM

## 2021-03-01 DIAGNOSIS — G47.33 OBSTRUCTIVE SLEEP APNEA SYNDROME: ICD-10-CM

## 2021-03-01 DIAGNOSIS — I25.10 ATHEROSCLEROSIS OF NATIVE CORONARY ARTERY OF NATIVE HEART WITHOUT ANGINA PECTORIS: ICD-10-CM

## 2021-03-01 PROCEDURE — 99214 OFFICE O/P EST MOD 30 MIN: CPT | Performed by: PHYSICIAN ASSISTANT

## 2021-03-01 PROCEDURE — 93000 ELECTROCARDIOGRAM COMPLETE: CPT | Performed by: PHYSICIAN ASSISTANT

## 2021-03-01 ASSESSMENT — MIFFLIN-ST. JEOR: SCORE: 1707.37

## 2021-03-01 NOTE — PROGRESS NOTES
Dr. Adams -  ??Do you want General Cardiology follow-up?? If so, when??    I saw Dayday today for routine EP follow-up.  You last saw him 1/2020 (had been an [facility] pt) - your note below. Remains in SR on EKG and last ZioPatch 12/2020.     EKG shows some slightly more pronounced STTW changes c/w 4/2020. Q wave noted in III. No c/o CP, just intermittent fatigue. I have not ordered general cardiology follow-up despite these mild EKG changes/more pronounced STTW changes, but let me know if you want to see him back or just have EP continue to follow!    Joleen      EKG 3/1/2021:     EKG 4/13/2020:        Service Date: 01/10/2020      REASON FOR CARDIOLOGY VISIT:  Recent hospitalization.      HISTORY OF PRESENT ILLNESS:  Mr. 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 Hay in EP Clinic and Joleen Marrero, and amiodarone was recently discontinued because of abnormal liver enzymes, other comorbidities of dyslipidemia, hypertension, obstructive sleep apnea, diabetes.  To be noted, the patient was recently admitted earlier this week because of some atypical chest discomfort.  He was ruled out for acute coronary syndrome with serial troponins negative.  No new EKG changes, although baseline EKG shows nonspecific T-wave inversions, he underwent exercise stress echocardiogram where he exercised for 9 minutes without any chest discomfort and no stress-induced regional wall motion abnormalities.  To be noted, the patient tells me that about a week ago when he was at his home, he suddenly felt abnormal sensation in his head.  He felt dizzy and the sensation went down to his chest and throughout his whole body, and then the next few days he had some very mild achy sensation in the chest which he describes not even 1/10 in intensity.  No nausea, no sweating, no shortness of breath.  No exertional component  to discomfort, and this discomfort has now completely resolved.  He feels quite well.  He is on apixaban.  He is on pravastatin, lisinopril as noted above.  Recently amiodarone was stopped by EP because of abnormal liver enzymes.  He had an EKG done just a few days ago that shows sinus rhythm with first-degree AV block with MI interval around 260 milliseconds and diffuse T-wave inversions similar to previous EKG.      The patient also has a followup next month with EP.  He is wearing Zio Patch for 2 weeks, and he started wearing it just yesterday.      PHYSICAL EXAMINATION:   VITAL SIGNS:  Blood pressure 116/54, heart rate 72 and regular, weight 194 pounds, BMI 27.13.   GENERAL:  The patient appears pleasant, comfortable.   NECK:  Normal JVP, no bruit.   CARDIOVASCULAR SYSTEM:  There is grade 2/6 very early peaking ejection systolic murmur heard best over the right upper sternal border, no S3, S4, rub or gallop.   RESPIRATORY SYSTEM:  Clear to auscultation bilaterally.   GASTROINTESTINAL SYSTEM:  Abdomen soft, nontender.   EXTREMITIES:  No pitting pedal edema.   NEUROLOGICAL:  Alert, oriented x3.   PSYCH:  Normal affect.   SKIN:  No obvious rash.   HEENT:  No pallor or icterus.      IMPRESSION AND PLAN:  A pleasant 79-year-old gentleman who recently had admission for what seems like very atypical chest discomfort, very mild in nature, was ruled out for acute coronary syndrome, had a low-risk stress test negative for inducible ischemia with normal LV function with what seems like maybe early mild aortic stenosis.  He also has atrial fibrillation, was on rhythm control strategy, following EP and was on amiodarone but that was recently discontinued because of abnormal liver enzymes.  His rhythm is regular today and on recent EKG was in sinus.  I had a long discussion with the patient regarding the nature of his presentation, which overall to me suggests low to only intermediate pretest probability of coronary artery  disease, and subsequently stress test was negative.  He just started weaning Zio Patch monitor, which I think is reasonable given that he had an episode of dizziness about a week ago.  I recommend that he finish the Zio Patch monitoring for 2 weeks and keep his followup appointment with EP next month, where results of the Zio Patch can be evaluated.  Of course, in the interim, if he notices any recurrence of symptoms or new symptoms, especially if any exertion-related symptoms, he should call our clinic.

## 2021-03-01 NOTE — LETTER
3/1/2021    Andrew Elizalde MD  [address]  [facility] [address]    RE: Pascual NASRIN Perea       Dear Colleague,    I had the pleasure of seeing Pascual ALEXANDRA Eliazar in the [facility].    [facility]      Assessment & Plan   Problem List Items Addressed This Visit     Coronary atherosclerosis    Relevant Orders    Follow-Up with Cardiologist      Other Visit Diagnoses     Other fatigue    -  Primary    Relevant Orders    Follow-Up with Cardiologist    Paroxysmal atrial fibrillation (H)        Relevant Orders    EKG 12-lead complete w/read - Clinics (performed today) (Completed)    SLEEP EVALUATION & MANAGEMENT REFERRAL - [facility] [phone]  (Age 18 and up)    Obstructive sleep apnea syndrome        Relevant Orders    SLEEP EVALUATION & MANAGEMENT REFERRAL - [facility] [phone]  (Age 18 and up)    Chest pain, unspecified type            [facility] LEXIE    I had the pleasure of seeing Dayday when he came for 3 m follow-up.  This 80 year old sees Dr. Adams and Dr. Jarvis for his history of:    1. Persistent AFib first diagnosed 5/2016, s/p DCCV 2016 and again 4/2018. Started on Amiodarone and underwent DCCV 2/2019 and 5/2019 (after cholecystectomy triggered AFib 3/2019). Dr. Jarvis noted we could proceed with PVI vs DCCV.  Amiodarone stopped 11/2019 due to abnl LFTs.  2. Benign essential hypertension  3. HFpEF, with fluid overload and ascites. EF 55% with mildly decreased RVSF. Low dose Lasix working well  4. CAD s/p CABG 1997. Stress echo 1/2020 during hospitalization 1/2020 was normal.     When I saw Dayday 5/2020, his palpitations had improved.  Zio patch done 4/2020 for complaints of recurrent palpitations had been benign, but he had not had any symptoms while wearing it.  There was some confusion on his levothyroxine dose, as he had not change the dose as  "recommended by Dr. Elizalde.  He had also stopped his lisinopril.  I had him continue Eliquis and noted he may require PVI if found to have recurrent symptomatic atrial fibrillation.  3-month follow-up was recommended, but we are just now seeing him back today.     He last saw Dr. Elizalde 11/2020 at which time pravastatin was stopped and atorvastatin 40 mg daily started.  He was to see him again 1/2021, but I do not see this is been done.    Interval History:  No c/o CP. No SOB. Doesn't exercise much. No palpitations that he's aware of. No dizziness. Notes lightheadedness when stands up quickly.    Notes fatigue intermittently, noting 2 weeks ago he felt really fatigued, napping more and falling asleep with TV but this past week did a lot better. Nothing he can think of that precipitated this. He keep track of blood sugars, BPs and HRs but don't seem to correlate with these episodes of fatigue. Of note, had been dx'd with BRANDON 2010 but wife didn't like how loud the machine was and he didn't like how dry it made his mouth. He's not worn it for almost a decade. Note last echo with decreased RVSF.     BPs at home 120-140s first thing in the morning before medications, typically 130s.     VITALS:  Vitals: /69 (BP Location: Left arm, Patient Position: Sitting, Cuff Size: Adult Regular)   Pulse 83   Ht 1.803 m (5' 11\")   Wt 97 kg (213 lb 14.4 oz)   SpO2 99%   BMI 29.83 kg/m      Diagnostic Testing:  EKG today, which I overread, showed SR 1st degree AV Block 84 bpm  1 week ZioPatch 4/2020 showed SR with avg HR 64 bpm (range  bpm). 1 5-beat run of SVT noted, avg  with rates to 110 bpm @ 04/24 @ ~0900. Asx'c. Rare ectopy noted.  ZioPatch x 2 weeks 1/2020 showed SR (79 bpm, range  bpm) and minimal ectopy <1%. Intermittent Mobitz I noted (1/15 @ 0718; 1/21 @1114), both with sleeping  Stress Echocardiogam 1/2020 was normal  Echocardiogram 4/25/2019 EF 55% with mildly decreased right ventricular systolic " function.  Mild aortic stenosis with a mean gradient of 9 mmHg and an aortic valve area 1.7 cm .  1+ mitral regurgitation was noted.  Left atrium was dilated at 4.6 cm with a volume index of 51.8 mL/m .  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 Repeat sleep eval  2. See us back 6 months (Vats, Jarvis or me)  3. Set up labs Dr. Elizalde requested for 1/2021    Assessment/Plan:    1. H/o Persistent AFib    DCCV 5/2019     Amiodarone stopped 11/2019. Last 2 ZioPatches have shown no recurrence (1/2020, 4/2020)    Remains on Eliquis 5 mg BID for CHADSVASc 5 for CHADSVASc (HFpEF, HTN, DM, CAD, age)     EKG with SR/long first degree                  PLAN:    Continue Eliquis    May require PVI if he's found to have recurrent sx'c AFib    6 m follow-up with EKG    2. Intermittent fatigue    No rhyme/reason for this based on BP, glucose, HR    Has untreated BRANDON    PLAN:    Get back into Sleep    Joleen Marrero PA-C, MSPAS      Orders Placed This Encounter   Procedures     SLEEP EVALUATION & MANAGEMENT REFERRAL - [facility] [phone]  (Age 18 and up)     Follow-Up with Cardiologist     EKG 12-lead complete w/read - Clinics (performed today)     No orders of the defined types were placed in this encounter.    There are no discontinued medications.      Encounter Diagnoses   Name Primary?     Paroxysmal atrial fibrillation (H)      Other fatigue Yes     Obstructive sleep apnea syndrome      Chest pain, unspecified type      Atherosclerosis of native coronary artery of native heart without angina pectoris        CURRENT MEDICATIONS:  Current Outpatient Medications   Medication Sig Dispense Refill     apixaban ANTICOAGULANT (ELIQUIS) 5 MG tablet Take 1 tablet (5 mg) by mouth 2 times daily 180 tablet 3     atorvastatin (LIPITOR) 40 MG tablet Take 1 tablet (40 mg) by mouth daily 30 tablet 11     azelastine (ASTELIN) 0.1 % nasal spray USE 2 SPRAYS INTO BOTH NOSTRILS TWO TIMES A DAY 90 mL 3     blood  glucose (NO BRAND SPECIFIED) test strip Use to test blood sugar 4 times daily or as directed. 400 each 1     blood glucose calibration (ACCU-CHEK NORMAN) solution USE AS DIRECTED 1 each 0     Cholecalciferol (VITAMIN D3 PO) Take 5,000 Units by mouth daily       famotidine (PEPCID) 20 MG tablet Take 2 tablets (40 mg) by mouth 2 times daily 180 tablet 2     furosemide (LASIX) 20 MG tablet TAKE ONE TABLET BY MOUTH ONCE DAILY 90 tablet 1     glucose 4 g CHEW Take 1 tablet by mouth every hour as needed for low blood sugar       insulin aspart (NOVOLOG FLEXPEN) 100 UNIT/ML pen Inject 3 times daily (before meals). 3 units per carb choice (15gm). Approx 10-15 units/meal. 45 mL 3     insulin detemir (LEVEMIR FLEXPEN/FLEXTOUCH) 100 UNIT/ML pen Inject 70 Units Subcutaneous At Bedtime 75 mL 3     insulin pen needle (NOVOFINE 30) 30G X 8 MM miscellaneous USE AS DIRECTED FOUR TIMES A  each 2     levothyroxine (SYNTHROID/LEVOTHROID) 100 MCG tablet Take 1 tablet (100 mcg) by mouth daily 30 tablet 11     lisinopril (ZESTRIL) 30 MG tablet Take 1 tablet (30 mg) by mouth daily 90 tablet 2     metoprolol tartrate (LOPRESSOR) 25 MG tablet Take 1 tablet (25 mg) by mouth 2 times daily 60 tablet 0     nitroglycerin (NITROSTAT) 0.4 MG SL tablet Place 1 tablet (0.4 mg) under the tongue every 5 minutes as needed for chest pain (Patient not taking: Reported on 3/1/2021) 25 tablet 1       ALLERGIES     Allergies   Allergen Reactions     Omeprazole      diarrhea         Review of Systems:  Skin:  Negative     Eyes:  Positive for glasses  ENT:  Positive for hearing loss  Respiratory:  Negative for dyspnea on exertion;shortness of breath  Cardiovascular:  Negative for;palpitations;chest pain;dizziness;lightheadedness;fatigue    Gastroenterology: Positive for heartburn  Genitourinary:  Negative    Musculoskeletal:  Positive for arthritis;joint pain  Neurologic:  Positive for numbness or tingling of feet  Psychiatric:  Negative   "  Heme/Lymph/Imm:  Negative    Endocrine:  Positive for thyroid disorder;diabetes    Physical Exam:  Vitals: /69 (BP Location: Left arm, Patient Position: Sitting, Cuff Size: Adult Regular)   Pulse 83   Ht 1.803 m (5' 11\")   Wt 97 kg (213 lb 14.4 oz)   SpO2 99%   BMI 29.83 kg/m      Constitutional:  cooperative, alert and oriented, well developed, well nourished, in no acute distress        Skin:  warm and dry to the touch, no apparent skin lesions or masses noted        Head:  normocephalic, no masses or lesions        Eyes:  pupils equal and round;conjunctivae and lids unremarkable;sclera white        ENT:  no pallor or cyanosis, dentition good        Neck:  JVP normal;no carotid bruit        Chest:  normal breath sounds, clear to auscultation, normal A-P diameter, normal symmetry, normal respiratory excursion, no use of accessory muscles        Cardiac: regular rhythm, normal S1/S2, no S3 or S4, apical impulse not displaced, no murmurs, gallops or rubs                  Abdomen:  abdomen soft obese      Vascular: pulses full and equal                                      Extremities and Back:  no deformities, clubbing, cyanosis, erythema observed;no edema        Neurological:  no gross motor deficits            PAST MEDICAL HISTORY:  Past Medical History:   Diagnosis Date     Adhesive capsulitis of shoulder      Anemia 3/10/2014     Anemia, unspecified      Antiplatelet or antithrombotic long-term use      Arrhythmia     Atrial fib/flutter     CAD (coronary artery disease)      History of cardioversion 04/24/2018    a single synchronized shock of 120 joules restored normal sinus rhythm     History of cardioversion 02/13/2019    single shock , 200 joules successul in restoring NSR     Hyperlipidemia LDL goal <70 5/26/2011     Hypertension      Hypothyroidism      Impotence of organic origin      Laceration of finger  June 2010     left thumb s/p sutures     Numbness and tingling     diabetic neuropathy " bilateral feet     BRANDON (obstructive sleep apnea)     intolerant of CPAP, uses it occasionally.  Using mandibular device occasionally     Osteopenia      Pulmonary hypertension (H) 4/6/2012     Sensorineural hearing loss, unspecified      Stented coronary artery 1997    CABG      Testosterone deficiency      Type 2 diabetes mellitus without complication  (goal A1C<8) 10/24/2015     Typical atrial flutter (H) 4/18/16     Unspecified hypothyroidism      Vitamin D deficiency 9/3/2011       PAST SURGICAL HISTORY:  Past Surgical History:   Procedure Laterality Date     ANESTHESIA CARDIOVERSION N/A 2/13/2019    Procedure: ANESTHESIA CARDIOVERSION;  Surgeon: GENERIC ANESTHESIA PROVIDER;  Location:  OR     ANESTHESIA CARDIOVERSION N/A 5/22/2019    Procedure: ANESTHESIA, FOR CARDIOVERSION;  Surgeon: GENERIC ANESTHESIA PROVIDER;  Location:  OR     CARDIAC SURGERY      bypass in 1997     CARDIOVERSION  04/24/2018     COLONOSCOPY       CORONARY ANGIOGRAPHY ADULT ORDER      4/2/2012     CORONARY ARTERY BYPASS  1997    [facility]     ENDOSCOPIC ULTRASOUND UPPER GASTROINTESTINAL TRACT (GI) N/A 3/14/2019    Procedure: ENDOSCOPIC ULTRASOUND OF UPPER GASTROINTESTINAL TRACT;  Surgeon: Ju Torres MD;  Location:  OR     EXCISE MASS HEAD Left 8/18/2016    Procedure: EXCISE MASS HEAD;  Surgeon: Ivan Hernandez MD;  Location:  SD     HEART CATH, ANGIOPLASTY  4/2012     LAPAROSCOPIC CHOLECYSTECTOMY N/A 3/17/2019    Procedure: LAPAROSCOPIC CHOLECYSTECTOMY;  Surgeon: Janel Paz MD;  Location:  OR     PHACOEMULSIFICATION CLEAR CORNEA WITH STANDARD INTRAOCULAR LENS IMPLANT Left 6/8/2015    Procedure: PHACOEMULSIFICATION CLEAR CORNEA WITH STANDARD INTRAOCULAR LENS IMPLANT;  Surgeon: Nixon Farnsworth MD;  Location:  EC     PHACOEMULSIFICATION CLEAR CORNEA WITH STANDARD INTRAOCULAR LENS IMPLANT Right 6/22/2015    Procedure: PHACOEMULSIFICATION CLEAR CORNEA WITH STANDARD INTRAOCULAR  LENS IMPLANT;  Surgeon: Nixon Farnsworth MD;  Location:  EC     SEPTOPLASTY, TURBINOPLASTY, COMBINED Bilateral 2016    Procedure: COMBINED SEPTOPLASTY, TURBINOPLASTY;  Surgeon: Ivan Hernandez MD;  Location: [facility] NONSPECIFIC PROCEDURE      CABG (Jainism)     [facility] NONSPECIFIC PROCEDURE      stress echo       FAMILY HISTORY:  Family History   Problem Relation Age of Onset     Genitourinary Problems Father         d: age 89; ARF     Hypertension Father      Diabetes Mother         d: age 68, CAD     Heart Disease Mother         MI     Myocardial Infarction Mother      Cardiovascular Brother         d:  age 31; CAD     Heart Disease Brother         age 31, MI     Diabetes Sister         b:  1939; DM2       SOCIAL HISTORY:  Social History     Socioeconomic History     Marital status:      Spouse name: None     Number of children: None     Years of education: None     Highest education level: None   Occupational History     None   Social Needs     Financial resource strain: None     Food insecurity     Worry: None     Inability: None     Transportation needs     Medical: None     Non-medical: None   Tobacco Use     Smoking status: Former Smoker     Packs/day: 1.00     Years: 6.00     Pack years: 6.00     Types: Cigarettes     Start date:      Quit date: 1964     Years since quittin.7     Smokeless tobacco: Never Used   Substance and Sexual Activity     Alcohol use: Yes     Comment: couple drinks a year     Drug use: No     Sexual activity: Yes     Partners: Female   Lifestyle     Physical activity     Days per week: None     Minutes per session: None     Stress: None   Relationships     Social connections     Talks on phone: None     Gets together: None     Attends Restorationist service: None     Active member of club or organization: None     Attends meetings of clubs or organizations: None     Relationship status: None     Intimate partner violence     Fear of current or  ex partner: None     Emotionally abused: None     Physically abused: None     Forced sexual activity: None   Other Topics Concern     Parent/sibling w/ CABG, MI or angioplasty before 65F 55M? Not Asked      Service Not Asked     Blood Transfusions Not Asked     Caffeine Concern Yes     Comment: 2 cups coffee a day     Occupational Exposure Not Asked     Hobby Hazards Not Asked     Sleep Concern Yes     Comment: sleep apnea, wears cpap off and on     Stress Concern No     Weight Concern No     Special Diet Yes     Comment: diabetic diet      Back Care Not Asked     Exercise No     Comment: occ walk the mall     Bike Helmet Not Asked     Seat Belt Yes     Self-Exams Not Asked   Social History Narrative     None       Thank you for allowing me to participate in the care of your patient.      Sincerely,     Rebekah Marrero PA-C     [facility]    cc:   Rebekah Marrero PA-C  [address] [address]

## 2021-03-01 NOTE — PATIENT INSTRUCTIONS
Dayday - it was good to see you today!    1. Reviewed EKG - normal rhythm today  2. Reviewed that BPs have been 120-140s, mostly in 130s  3. Reviewed your intermittently fatigued weeks.     PLAN:   1. Will get you back in with Sleep  2. Dr. Elizalde wanted you to get FASTING labs 1/2021 so call/MyChart to get appt with lab [phone]  3. Keep track of BPs, HRs.   4. See me or Dr. Adams or Dr. Jarvis in ~6 months but CALL if issues prior! [phone]

## 2021-03-02 ENCOUNTER — IMMUNIZATION (OUTPATIENT)
Dept: NURSING | Facility: CLINIC | Age: 81
End: 2021-03-02
Payer: COMMERCIAL

## 2021-03-02 PROCEDURE — 91300 PR COVID VAC PFIZER DIL RECON 30 MCG/0.3 ML IM: CPT

## 2021-03-02 PROCEDURE — 0001A PR COVID VAC PFIZER DIL RECON 30 MCG/0.3 ML IM: CPT

## 2021-03-02 NOTE — PROGRESS NOTES
Juancarlos Goodrich,    I am okay with either way.  If he is stable cardiac status wise may be reasonable to follow-up with you guys but would be happy to see him as well.  If you think there is no urgency in seeing me I will be happy to see him in 6 months or so.    Thank you  [place]

## 2021-03-02 NOTE — PROGRESS NOTES
Reviewed - will see Vats 6 months (9/2021) and EP (Jarvis or me) 6 m following that 3/2022. We can alternate EP/General Cards q 6 months if needed.    Orders placed.

## 2021-03-08 DIAGNOSIS — R60.9 EDEMA, UNSPECIFIED TYPE: ICD-10-CM

## 2021-03-09 DIAGNOSIS — E21.3 HYPERPARATHYROIDISM (H): ICD-10-CM

## 2021-03-09 DIAGNOSIS — E78.5 HYPERLIPIDEMIA LDL GOAL <70: ICD-10-CM

## 2021-03-09 DIAGNOSIS — E11.9 TYPE 2 DIABETES MELLITUS WITHOUT COMPLICATION, WITH LONG-TERM CURRENT USE OF INSULIN (H): ICD-10-CM

## 2021-03-09 DIAGNOSIS — Z79.4 TYPE 2 DIABETES MELLITUS WITHOUT COMPLICATION, WITH LONG-TERM CURRENT USE OF INSULIN (H): ICD-10-CM

## 2021-03-09 DIAGNOSIS — I10 BENIGN ESSENTIAL HYPERTENSION: ICD-10-CM

## 2021-03-09 DIAGNOSIS — N18.31 STAGE 3A CHRONIC KIDNEY DISEASE (H): ICD-10-CM

## 2021-03-09 LAB
ALBUMIN SERPL-MCNC: 4.3 G/DL (ref 3.4–5)
ALP SERPL-CCNC: 133 U/L (ref 40–150)
ALT SERPL W P-5'-P-CCNC: 73 U/L (ref 0–70)
ANION GAP SERPL CALCULATED.3IONS-SCNC: 2 MMOL/L (ref 3–14)
AST SERPL W P-5'-P-CCNC: 34 U/L (ref 0–45)
BILIRUB SERPL-MCNC: 0.9 MG/DL (ref 0.2–1.3)
BUN SERPL-MCNC: 18 MG/DL (ref 7–30)
CALCIUM SERPL-MCNC: 11.3 MG/DL (ref 8.5–10.1)
CHLORIDE SERPL-SCNC: 104 MMOL/L (ref 94–109)
CHOLEST SERPL-MCNC: 212 MG/DL
CO2 SERPL-SCNC: 30 MMOL/L (ref 20–32)
CREAT SERPL-MCNC: 1.22 MG/DL (ref 0.66–1.25)
GFR SERPL CREATININE-BSD FRML MDRD: 56 ML/MIN/{1.73_M2}
GLUCOSE SERPL-MCNC: 190 MG/DL (ref 70–99)
HBA1C MFR BLD: 8.8 % (ref 0–5.6)
HDLC SERPL-MCNC: 49 MG/DL
LDLC SERPL CALC-MCNC: ABNORMAL MG/DL
LDLC SERPL DIRECT ASSAY-MCNC: 88 MG/DL
NONHDLC SERPL-MCNC: 163 MG/DL
POTASSIUM SERPL-SCNC: 4.6 MMOL/L (ref 3.4–5.3)
PROT SERPL-MCNC: 7.6 G/DL (ref 6.8–8.8)
SODIUM SERPL-SCNC: 136 MMOL/L (ref 133–144)
TRIGL SERPL-MCNC: 499 MG/DL

## 2021-03-09 PROCEDURE — 80053 COMPREHEN METABOLIC PANEL: CPT | Performed by: INTERNAL MEDICINE

## 2021-03-09 PROCEDURE — 83721 ASSAY OF BLOOD LIPOPROTEIN: CPT | Mod: XU | Performed by: INTERNAL MEDICINE

## 2021-03-09 PROCEDURE — 83036 HEMOGLOBIN GLYCOSYLATED A1C: CPT | Performed by: INTERNAL MEDICINE

## 2021-03-09 PROCEDURE — 80061 LIPID PANEL: CPT | Performed by: INTERNAL MEDICINE

## 2021-03-09 PROCEDURE — 36415 COLL VENOUS BLD VENIPUNCTURE: CPT | Performed by: INTERNAL MEDICINE

## 2021-03-09 RX ORDER — METOPROLOL TARTRATE 25 MG/1
25 TABLET, FILM COATED ORAL 2 TIMES DAILY
Qty: 180 TABLET | Refills: 3 | Status: ON HOLD | OUTPATIENT
Start: 2021-03-09 | End: 2021-12-28

## 2021-03-09 RX ORDER — FUROSEMIDE 20 MG
TABLET ORAL
Qty: 90 TABLET | Refills: 1 | Status: SHIPPED | OUTPATIENT
Start: 2021-03-09 | End: 2021-06-17

## 2021-03-22 ENCOUNTER — TRANSFERRED RECORDS (OUTPATIENT)
Dept: HEALTH INFORMATION MANAGEMENT | Facility: CLINIC | Age: 81
End: 2021-03-22

## 2021-03-22 LAB — RETINOPATHY: NEGATIVE

## 2021-03-23 ENCOUNTER — IMMUNIZATION (OUTPATIENT)
Dept: NURSING | Facility: CLINIC | Age: 81
End: 2021-03-23
Attending: STUDENT IN AN ORGANIZED HEALTH CARE EDUCATION/TRAINING PROGRAM
Payer: COMMERCIAL

## 2021-03-23 DIAGNOSIS — E11.9 TYPE 2 DIABETES MELLITUS WITHOUT COMPLICATION, WITH LONG-TERM CURRENT USE OF INSULIN (H): ICD-10-CM

## 2021-03-23 DIAGNOSIS — Z79.4 TYPE 2 DIABETES MELLITUS WITHOUT COMPLICATION, WITH LONG-TERM CURRENT USE OF INSULIN (H): ICD-10-CM

## 2021-03-23 PROCEDURE — 0002A PR COVID VAC PFIZER DIL RECON 30 MCG/0.3 ML IM: CPT

## 2021-03-23 PROCEDURE — 91300 PR COVID VAC PFIZER DIL RECON 30 MCG/0.3 ML IM: CPT

## 2021-03-24 RX ORDER — INSULIN ASPART 100 [IU]/ML
INJECTION, SOLUTION INTRAVENOUS; SUBCUTANEOUS
Qty: 45 ML | Refills: 1 | Status: SHIPPED | OUTPATIENT
Start: 2021-03-24 | End: 2021-09-28

## 2021-04-11 ENCOUNTER — MYC MEDICAL ADVICE (OUTPATIENT)
Dept: INTERNAL MEDICINE | Facility: CLINIC | Age: 81
End: 2021-04-11

## 2021-04-11 DIAGNOSIS — K75.9 HEPATITIS: ICD-10-CM

## 2021-04-11 DIAGNOSIS — E11.9 TYPE 2 DIABETES MELLITUS WITHOUT COMPLICATION, WITH LONG-TERM CURRENT USE OF INSULIN (H): ICD-10-CM

## 2021-04-11 DIAGNOSIS — E83.52 HYPERCALCEMIA: ICD-10-CM

## 2021-04-11 DIAGNOSIS — L42: Primary | ICD-10-CM

## 2021-04-11 DIAGNOSIS — Z79.4 TYPE 2 DIABETES MELLITUS WITHOUT COMPLICATION, WITH LONG-TERM CURRENT USE OF INSULIN (H): ICD-10-CM

## 2021-04-12 DIAGNOSIS — E03.9 HYPOTHYROIDISM, UNSPECIFIED TYPE: ICD-10-CM

## 2021-04-12 RX ORDER — LEVOTHYROXINE SODIUM 100 UG/1
TABLET ORAL
Qty: 30 TABLET | Refills: 5 | Status: SHIPPED | OUTPATIENT
Start: 2021-04-12 | End: 2021-10-13

## 2021-04-20 DIAGNOSIS — E83.52 HYPERCALCEMIA: ICD-10-CM

## 2021-04-20 DIAGNOSIS — K75.9 HEPATITIS: ICD-10-CM

## 2021-04-20 LAB
ALBUMIN SERPL-MCNC: 3.8 G/DL (ref 3.4–5)
ALP SERPL-CCNC: 119 U/L (ref 40–150)
ALT SERPL W P-5'-P-CCNC: 54 U/L (ref 0–70)
ANION GAP SERPL CALCULATED.3IONS-SCNC: 3 MMOL/L (ref 3–14)
AST SERPL W P-5'-P-CCNC: 34 U/L (ref 0–45)
BILIRUB SERPL-MCNC: 1 MG/DL (ref 0.2–1.3)
BUN SERPL-MCNC: 18 MG/DL (ref 7–30)
CALCIUM SERPL-MCNC: 10 MG/DL (ref 8.5–10.1)
CHLORIDE SERPL-SCNC: 105 MMOL/L (ref 94–109)
CO2 SERPL-SCNC: 29 MMOL/L (ref 20–32)
CREAT SERPL-MCNC: 1.27 MG/DL (ref 0.66–1.25)
GFR SERPL CREATININE-BSD FRML MDRD: 53 ML/MIN/{1.73_M2}
GLUCOSE SERPL-MCNC: 240 MG/DL (ref 70–99)
POTASSIUM SERPL-SCNC: 4.6 MMOL/L (ref 3.4–5.3)
PROT SERPL-MCNC: 6.7 G/DL (ref 6.8–8.8)
SODIUM SERPL-SCNC: 137 MMOL/L (ref 133–144)

## 2021-04-20 PROCEDURE — 80053 COMPREHEN METABOLIC PANEL: CPT | Performed by: INTERNAL MEDICINE

## 2021-04-20 PROCEDURE — 36415 COLL VENOUS BLD VENIPUNCTURE: CPT | Performed by: INTERNAL MEDICINE

## 2021-05-10 ENCOUNTER — OFFICE VISIT (OUTPATIENT)
Dept: INTERNAL MEDICINE | Facility: CLINIC | Age: 81
End: 2021-05-10
Payer: COMMERCIAL

## 2021-05-10 VITALS
TEMPERATURE: 97.2 F | SYSTOLIC BLOOD PRESSURE: 136 MMHG | OXYGEN SATURATION: 98 % | WEIGHT: 212 LBS | DIASTOLIC BLOOD PRESSURE: 68 MMHG | BODY MASS INDEX: 29.57 KG/M2 | HEART RATE: 57 BPM | RESPIRATION RATE: 16 BRPM

## 2021-05-10 DIAGNOSIS — E78.5 HYPERLIPIDEMIA LDL GOAL <70: ICD-10-CM

## 2021-05-10 DIAGNOSIS — Z79.4 TYPE 2 DIABETES MELLITUS WITHOUT COMPLICATION, WITH LONG-TERM CURRENT USE OF INSULIN (H): ICD-10-CM

## 2021-05-10 DIAGNOSIS — N18.31 STAGE 3A CHRONIC KIDNEY DISEASE (H): ICD-10-CM

## 2021-05-10 DIAGNOSIS — K59.00 CONSTIPATION, UNSPECIFIED CONSTIPATION TYPE: ICD-10-CM

## 2021-05-10 DIAGNOSIS — E11.9 TYPE 2 DIABETES MELLITUS WITHOUT COMPLICATION, WITH LONG-TERM CURRENT USE OF INSULIN (H): ICD-10-CM

## 2021-05-10 DIAGNOSIS — G47.33 OSA (OBSTRUCTIVE SLEEP APNEA): ICD-10-CM

## 2021-05-10 DIAGNOSIS — E21.3 HYPERPARATHYROIDISM (H): ICD-10-CM

## 2021-05-10 PROCEDURE — 99214 OFFICE O/P EST MOD 30 MIN: CPT | Performed by: INTERNAL MEDICINE

## 2021-05-10 RX ORDER — FENOFIBRATE 67 MG/1
67 CAPSULE ORAL
Qty: 30 CAPSULE | Refills: 11 | Status: SHIPPED | OUTPATIENT
Start: 2021-05-10 | End: 2022-06-01

## 2021-05-10 NOTE — PATIENT INSTRUCTIONS
Fenofibrate 67mg capsule, 1 capsule daily  For caty triglycerides   Increase Lantus/ Levemir daily  to 72 units at night  Increase your usual Novolog AM breakfast dose by 2 units  Continue other Novolog dosing  Call  [phone] or use [employer] to schedule a future lab appointment  fasting in 1 month.   For fasting labs, please refrain from eating for 8 hours or more.   Drink 2 glasses of water before your lab appointment. It is fine to take your  oral medications on the morning of the lab test as usual  Schedule a follow up appointment with me in clinic a few days after these future labs are drawn to review results and other medical issues as necessary. Check blood sugars four times a day (before meals and bedtime) for the 4 days prior to the appointment with me,  write them down and have them available to review with the appointment  Metamucil 1 tbsp daily with glass of liquid daily as needed for constipation   Monitor abdominal symptoms over th next week and pay attention to whether gets better with bowel movements. If not, then contact clinic and will get CT of the abdomen  Talk with [facility] to see if they will cover a  Freestyle Linda  Continue glucose monitoror (CGM) an whether can get through a pharmacy or if they require Medical supply company and let  Me know via [employer] message  Schedule appt with  Sleep clinic re: obstructive sleep apnea. Call [phone]

## 2021-05-10 NOTE — PROGRESS NOTES
ASSESSMENT:   1. Type 2 diabetes mellitus without complication, with long-term current use of insulin (H)  Uncontrolled.  Will increase daily Lantus along with breakfast NovoLog.  Patient will try to improve exercise while maintaining diabetic diet.  Recheck A1c in a month  - Comprehensive metabolic panel; Future  - Hemoglobin A1c; Future  - insulin glargine (LANTUS PEN) 100 UNIT/ML pen; Inject 72 Units Subcutaneous At Bedtime  Dispense: 75 mL; Refill: 3    2. Stage 3a chronic kidney disease  Mild worsening with most recent lab.  Patient is on diuretic currently.  No edema seen on exam.  Recheck in 1 month.  If GFR dropping further, will back on a diuretic dosing which we prescribed by cardiology  - Comprehensive metabolic panel; Future    3. Hyperlipidemia LDL goal <70  Significant hypertriglyceridemia now to the point where that has shown benefit to using fibrate therapy.  With renal status, need to maximize dose at 67 mg/day.  Improvement in blood sugar control as above.  Recheck labs 1 month  - Comprehensive metabolic panel; Future  - Lipid panel reflex to direct LDL Fasting; Future  - fenofibrate micronized (LOFIBRA) 67 MG capsule; Take 1 capsule (67 mg) by mouth every morning (before breakfast)  Dispense: 30 capsule; Refill: 11    4. Hyperparathyroidism (H)  Mild elevation.  Calcium levels normal with most recent exam after previous elevation.  Will recheck in 1 month as previously ordered    5. BRANDON (obstructive sleep apnea)  Untreated sleep apnea.  Recently referred to [facility] again by cardiology.  Encouraged patient to schedule appointment    6. Constipation, unspecified constipation type  Recent thyroid studies normal.  Patient will try using Metamucil and increase walking exercise.  Possible adjustment in diuretic therapy downward as above.  If vague abdominal pain symptoms present intermittently continue to occur and not related to bowel movements, patient will inform physician and will  then check CT of the abdomen          PLAN:   Fenofibrate 67mg capsule, 1 capsule daily  For caty triglycerides   Increase Lantus/ Levemir daily  to 72 units at night  Increase your usual Novolog AM breakfast dose by 2 units  Continue other Novolog dosing  Call  [phone] or use [facility] to schedule a future lab appointment  fasting in 1 month.   For fasting labs, please refrain from eating for 8 hours or more.   Drink 2 glasses of water before your lab appointment. It is fine to take your  oral medications on the morning of the lab test as usual  Schedule a follow up appointment with me in clinic a few days after these future labs are drawn to review results and other medical issues as necessary. Check blood sugars four times a day (before meals and bedtime) for the 4 days prior to the appointment with me,  write them down and have them available to review with the appointment  Metamucil 1 tbsp daily with glass of liquid daily as needed for constipation   Monitor abdominal symptoms over th next week and pay attention to whether gets better with bowel movements. If not, then contact clinic and will get CT of the abdomen  Talk with [facility] to see if they will cover a  Freestyle Ilnda  Continue glucose monitoror (CGM) an whether can get through a pharmacy or if they require Medical supply company and let  Me know via [facility] message  Schedule appt with  Sleep clinic re: obstructive sleep apnea. Call [phone]          (Chart documentation was completed, in part, with [employer] voice-recognition software. Even though reviewed, some grammatical, spelling, and word errors may remain.)    Andrew Elizalde MD  Internal Medicine Department  [facility]      Max Naylor is a 80 year old who presents for the following health issues     HPI     Diabetes Follow-up    How often are you checking your blood sugar? Four or more times daily  Blood sugar testing frequency justification:  Provider  Request  What time of day are you checking your blood sugars (select all that apply)?  Before meals and At bedtime  Have you had any blood sugars above 200?  Yes more around lunch and dinner time  Have you had any blood sugars below 70?  No    What symptoms do you notice when your blood sugar is low?  Shaky, Dizzy, Weak and Lethargy-Last week    What concerns do you have today about your diabetes? Blood sugar is often over 200     Do you have any of these symptoms? (Select all that apply)  Numbness in feet, Burning in feet and Redness, sores, or blisters on feet              Hyperlipidemia Follow-Up      Are you regularly taking any medication or supplement to lower your cholesterol?   Yes- Atorvastatin    Are you having muscle aches or other side effects that you think could be caused by your cholesterol lowering medication?  Yes- Patient c/o cramping in lower leg cramping at night    Hypertension Follow-up      Do you check your blood pressure regularly outside of the clinic? Yes     Are you following a low salt diet? Yes    Are your blood pressures ever more than 140 on the top number (systolic) OR more   than 90 on the bottom number (diastolic), for example 140/90? Yes    BP Readings from Last 2 Encounters:   03/01/21 135/69   11/19/20 126/78     Hemoglobin A1C (%)   Date Value   03/09/2021 8.8 (H)   09/25/2020 7.9 (H)     LDL Cholesterol Calculated (mg/dL)   Date Value   03/09/2021     Cannot estimate LDL when triglyceride exceeds 400 mg/dL   09/25/2020     Cannot estimate LDL when triglyceride exceeds 400 mg/dL     LDL Cholesterol Direct (mg/dL)   Date Value   03/09/2021 88   09/25/2020 82      Most recent lab results reviewed with pt.      Component      Latest Ref Rng & Units 9/25/2020 11/13/2020   Sodium      133 - 144 mmol/L 135 136   Potassium      3.4 - 5.3 mmol/L 4.5 4.8   Chloride      94 - 109 mmol/L 104 104   Carbon Dioxide      20 - 32 mmol/L 24 29   Anion Gap      3 - 14 mmol/L 7 2 (L)   Glucose       70 - 99 mg/dL 212 (H) 321 (H)   Urea Nitrogen      7 - 30 mg/dL 19 20   Creatinine      0.66 - 1.25 mg/dL 1.22 1.16   GFR Estimate      >60 mL/min/1.73:m2 56 (L) 59 (L)   GFR Estimate If Black      >60 mL/min/1.73:m2 65 69   Calcium      8.5 - 10.1 mg/dL 10.3 (H) 10.2 (H)   Bilirubin Total      0.2 - 1.3 mg/dL 1.1    Albumin      3.4 - 5.0 g/dL 3.9    Protein Total      6.8 - 8.8 g/dL 7.2    Alkaline Phosphatase      40 - 150 U/L 121    ALT      0 - 70 U/L 48    AST      0 - 45 U/L 28    Cholesterol      <200 mg/dL     Triglycerides      <150 mg/dL     HDL Cholesterol      >39 mg/dL     LDL Cholesterol Calculated      <100 mg/dL     Non HDL Cholesterol      <130 mg/dL     Hemoglobin A1C      0 - 5.6 % 7.9 (H)    Vitamin D Deficiency screening      20 - 75 ug/L  44   Parathyroid Hormone Intact      18 - 80 pg/mL  97 (H)   LDL Cholesterol Direct      <100 mg/dL       Component      Latest Ref Rng & Units 3/9/2021   Sodium      133 - 144 mmol/L 136   Potassium      3.4 - 5.3 mmol/L 4.6   Chloride      94 - 109 mmol/L 104   Carbon Dioxide      20 - 32 mmol/L 30   Anion Gap      3 - 14 mmol/L 2 (L)   Glucose      70 - 99 mg/dL 190 (H)   Urea Nitrogen      7 - 30 mg/dL 18   Creatinine      0.66 - 1.25 mg/dL 1.22   GFR Estimate      >60 mL/min/1.73:m2 56 (L)   GFR Estimate If Black      >60 mL/min/1.73:m2 65   Calcium      8.5 - 10.1 mg/dL 11.3 (H)   Bilirubin Total      0.2 - 1.3 mg/dL 0.9   Albumin      3.4 - 5.0 g/dL 4.3   Protein Total      6.8 - 8.8 g/dL 7.6   Alkaline Phosphatase      40 - 150 U/L 133   ALT      0 - 70 U/L 73 (H)   AST      0 - 45 U/L 34   Cholesterol      <200 mg/dL 212 (H)   Triglycerides      <150 mg/dL 499 (H)   HDL Cholesterol      >39 mg/dL 49   LDL Cholesterol Calculated      <100 mg/dL Cannot estimate LDL when triglyceride exceeds 400 mg/dL   Non HDL Cholesterol      <130 mg/dL 163 (H)   Hemoglobin A1C      0 - 5.6 % 8.8 (H)   Vitamin D Deficiency screening      20 - 75 ug/L    Parathyroid  Hormone Intact      18 - 80 pg/mL    LDL Cholesterol Direct      <100 mg/dL 88     Component      Latest Ref Rng & Units 4/20/2021   Sodium      133 - 144 mmol/L 137   Potassium      3.4 - 5.3 mmol/L 4.6   Chloride      94 - 109 mmol/L 105   Carbon Dioxide      20 - 32 mmol/L 29   Anion Gap      3 - 14 mmol/L 3   Glucose      70 - 99 mg/dL 240 (H)   Urea Nitrogen      7 - 30 mg/dL 18   Creatinine      0.66 - 1.25 mg/dL 1.27 (H)   GFR Estimate      >60 mL/min/1.73:m2 53 (L)   GFR Estimate If Black      >60 mL/min/1.73:m2 62   Calcium      8.5 - 10.1 mg/dL 10.0   Bilirubin Total      0.2 - 1.3 mg/dL 1.0   Albumin      3.4 - 5.0 g/dL 3.8   Protein Total      6.8 - 8.8 g/dL 6.7 (L)   Alkaline Phosphatase      40 - 150 U/L 119   ALT      0 - 70 U/L 54   AST      0 - 45 U/L 34   Cholesterol      <200 mg/dL    Triglycerides      <150 mg/dL    HDL Cholesterol      >39 mg/dL    LDL Cholesterol Calculated      <100 mg/dL    Non HDL Cholesterol      <130 mg/dL    Hemoglobin A1C      0 - 5.6 %    Vitamin D Deficiency screening      20 - 75 ug/L    Parathyroid Hormone Intact      18 - 80 pg/mL    LDL Cholesterol Direct      <100 mg/dL         Sugars:  197,271,260,344  187,268,173,169  170,283,280,322  207,238,214,216  156      Following DM diet.  Occ exercise  Occ pains in BLQ of abdomen at night. Some constipation. Denies seeing blood in stools. Not sure if pains better after BMs. Denies abd pain currently. No N/V.  Last colonoscopy 2017. Most recent TSH normal  Mild chronic SOB, better since starting Lasix by cardiology. No recent palpitations with A fib hx. On Eliquis  Vision OK  HAs been referred by cardiology  to  Sleep clinic for eval  Of known  BRANDON but has not made appt yet. Had CPAP > 10 years ago but too loud then so stopped using       Additional ROS:   Constitutional, HEENT, Cardiovascular, Pulmonary, GI and , Neuro, MSK and Psych review of systems/symptoms are otherwise negative or unchanged from previous, except  "as noted above.      OBJECTIVE:  /68   Pulse 57   Temp 97.2  F (36.2  C) (Temporal)   Resp 16   Wt 96.2 kg (212 lb)   SpO2 98%   BMI 29.57 kg/m     Estimated body mass index is 29.57 kg/m  as calculated from the following:    Height as of 3/1/21: 1.803 m (5' 11\").    Weight as of this encounter: 96.2 kg (212 lb).     Neck: no adenopathy. Thyroid normal to palpation. No bruits  Pulm: Lungs clear to auscultation   CV: Regular rates and rhythm  GI: Soft, nontender all 4 quads curerntly, Normal active bowel sounds, No hepatosplenomegaly or masses palpable  Ext: Peripheral pulses intact. Trace BLE edema.  Neuro: Normal strength and tone, sensory exam grossly normal              "

## 2021-05-14 ENCOUNTER — MYC MEDICAL ADVICE (OUTPATIENT)
Dept: INTERNAL MEDICINE | Facility: CLINIC | Age: 81
End: 2021-05-14

## 2021-05-14 ENCOUNTER — TELEPHONE (OUTPATIENT)
Dept: INTERNAL MEDICINE | Facility: CLINIC | Age: 81
End: 2021-05-14

## 2021-05-14 DIAGNOSIS — E11.9 TYPE 2 DIABETES MELLITUS WITHOUT COMPLICATION, WITH LONG-TERM CURRENT USE OF INSULIN (H): Primary | ICD-10-CM

## 2021-05-14 DIAGNOSIS — Z79.4 TYPE 2 DIABETES MELLITUS WITHOUT COMPLICATION, WITH LONG-TERM CURRENT USE OF INSULIN (H): Primary | ICD-10-CM

## 2021-05-14 NOTE — TELEPHONE ENCOUNTER
Central Prior Authorization Team   Phone: [phone]    PA Initiation    Medication: Freestyle Linda 2 [employer]  Insurance Company: [employer] - Phone [phone] Fax [phone]  Pharmacy Filling the Rx: [address]  Filling Pharmacy Phone: [phone]  Filling Pharmacy Fax:    Start Date: 5/14/2021    Started PA on CMM and a response of Drug is covered by current benefit plan. No further PA activity needed.  Pharmacy is getting a rejection that PA is needed.  Called and spoke with Sayra at [facility].  Rosalbaa shows PA is needed.  Completed via phone.  Case [id number]

## 2021-05-14 NOTE — TELEPHONE ENCOUNTER
Central Prior Authorization Team   Phone: [phone]      PA Initiation    Medication: Freestyle Linda 2 sensors  Insurance Company: [employer] - Phone [phone] Fax [phone]  Pharmacy Filling the Rx: [facility] [address]  Filling Pharmacy Phone: [phone]  Filling Pharmacy Fax:    Start Date: 5/14/2021    Started PA on CMM and a response of Drug is covered by current benefit plan. No further PA activity needed.  Pharmacy is getting a rejection that PA is needed.  Called and spoke with Sayra at [facility].  Rosalbaa shows PA is needed.  Completed via phone.  Case [id number]

## 2021-05-14 NOTE — TELEPHONE ENCOUNTER
Prior Authorization Approval    Authorization Effective Date: 4/14/2021  Authorization Expiration Date: 5/13/2024  Medication: Freestyle Linad 2 sensors-APPROVED  Approved Dose/Quantity:   Reference #:     Insurance Company: [employer] - Phone [phone] Fax [phone]  Expected CoPay:       CoPay Card Available:      Foundation Assistance Needed:    Which Pharmacy is filling the prescription (Not needed for infusion/clinic administered): [facility] [address]  Pharmacy Notified: Yes  Patient Notified: No    Got verbal approval.  Pharmacy will notify patient when medication is ready.

## 2021-06-07 ENCOUNTER — ALLIED HEALTH/NURSE VISIT (OUTPATIENT)
Dept: EDUCATION SERVICES | Facility: CLINIC | Age: 81
End: 2021-06-07
Attending: INTERNAL MEDICINE
Payer: COMMERCIAL

## 2021-06-07 DIAGNOSIS — E11.9 TYPE 2 DIABETES MELLITUS WITHOUT COMPLICATION, WITH LONG-TERM CURRENT USE OF INSULIN (H): ICD-10-CM

## 2021-06-07 DIAGNOSIS — Z79.4 TYPE 2 DIABETES MELLITUS WITHOUT COMPLICATION, WITH LONG-TERM CURRENT USE OF INSULIN (H): ICD-10-CM

## 2021-06-07 PROCEDURE — G0108 DIAB MANAGE TRN  PER INDIV: HCPCS | Performed by: DIETITIAN, REGISTERED

## 2021-06-07 NOTE — PROGRESS NOTES
"      Diabetes Self-Management Education & Support    Presents for: Personal CGM Start    SUBJECTIVE/OBJECTIVE:  Presents for: Personal CGM Start  Accompanied by: Self  Diabetes education in the past 24mo: No  Focus of Visit: CGM  Diabetes type: Type 2  Disease course: Worsening  How confident are you filling out medical forms by yourself:: Not Assessed  Diabetes management related comments/concerns: In the process of moving. Stopped checking BG about a week. Morning numbers usually > 200 mg/dL.  Other concerns:: None  Cultural Influences/Ethnic Background:  Choose not to answer    Diabetes Symptoms & Complications:  Weight trend: Stable  Complications assessed today?: Yes  Heart disease: Yes  Nephropathy: Yes    Patient Problem List and Family Medical History reviewed for relevant medical history, current medical status, and diabetes risk factors.    Vitals:  There were no vitals taken for this visit.  Estimated body mass index is 29.57 kg/m  as calculated from the following:    Height as of 3/1/21: 1.803 m (5' 11\").    Weight as of 5/10/21: 96.2 kg (212 lb).   Last 3 BP:   BP Readings from Last 3 Encounters:   05/10/21 136/68   03/01/21 135/69   11/19/20 126/78       History   Smoking Status     Former Smoker     Packs/day: 1.00     Years: 6.00     Types: Cigarettes     Start date: 1958     Quit date: 7/1/1964   Smokeless Tobacco     Never Used       Labs:  Lab Results   Component Value Date    A1C 8.8 03/09/2021     Lab Results   Component Value Date     04/20/2021     Lab Results   Component Value Date    LDL  03/09/2021     Cannot estimate LDL when triglyceride exceeds 400 mg/dL    LDL 88 03/09/2021     HDL Cholesterol   Date Value Ref Range Status   03/09/2021 49 >39 mg/dL Final   ]  GFR Estimate   Date Value Ref Range Status   04/20/2021 53 (L) >60 mL/min/[1.73_m2] Final     Comment:     Non  GFR Calc  Starting 12/18/2018, serum creatinine based estimated GFR (eGFR) will be   calculated " using the Chronic Kidney Disease Epidemiology Collaboration   (CKD-EPI) equation.       GFR Estimate If Black   Date Value Ref Range Status   04/20/2021 62 >60 mL/min/[1.73_m2] Final     Comment:      GFR Calc  Starting 12/18/2018, serum creatinine based estimated GFR (eGFR) will be   calculated using the Chronic Kidney Disease Epidemiology Collaboration   (CKD-EPI) equation.       Lab Results   Component Value Date    CR 1.27 04/20/2021     No results found for: MICROALBUMIN    Healthy Eating:  Healthy Eating Assessed Today: No  Cultural/Catholic diet restrictions?: No  Usually 5 carb choices per meal per pt but did not have time to discuss diet in detail today.     Being Active:  Being Active Assessed Today: No    Monitoring:  Monitoring Assessed Today: Yes  Did patient bring glucose meter to appointment? : Yes  Blood Glucose Meter: CGM started today  Blood glucose trend: Fluctuating - stopped testing his BG a week ago. FBS > 200 mg/dL prior to that usually    Taking Medications:  Diabetes Medication(s)     Insulin       insulin aspart (NOVOLOG FLEXPEN) 100 UNIT/ML pen    INJECT THREE TIMES A DAY (BEFORE MEALS) 3 UNITS PER CARB CHOICE (15 GM) APPROX 10-15 UNITS/ MEAL     insulin glargine (LANTUS PEN) 100 UNIT/ML pen    Inject 72 Units Subcutaneous At Bedtime          Taking Medication Assessed Today: Yes  Current Treatments: Insulin Injections  Given by: Patient  Injection/Infusion sites: Abdomen  Problems taking diabetes medications regularly?: No  Diabetes medication side effects?: No    Problem Solving:  Problem Solving Assessed Today: Yes  Is the patient at risk for hypoglycemia?: Yes  Hypoglycemia Frequency: Rarely  Low sx: hungry, tired      Reducing Risks:  Reducing Risks Assessed Today: No  Diabetes Risks: Age over 45 years, Hyperlipidemia  CAD Risks: Diabetes Mellitus, Dyslipidemia, Hypertension, Male sex, Stress    Healthy Coping:  Healthy Coping Assessed Today: Yes  Emotional response  to diabetes: Ready to learn  Stage of change: PREPARATION (Decided to change - considering how)  Patient Activation Measure Survey Score:  JUVE Score (Last Two) 10/14/2010   JUVE Raw Score 38   Activation Score 52.9   JUVE Level 2       Diabetes knowledge and skills assessment:   Patient is knowledgeable in diabetes management concepts related to: Being Active, Problem Solving, Reducing Risks and Healthy Coping  Patient needs further education on the following diabetes management concepts: Being Active, Monitoring and Taking Medication  Based on learning assessment above, most appropriate setting for further diabetes education would be: Group class or Individual setting.      INTERVENTIONS:  Pt brought in his Linda 2 sensor to start today. Low alert set to 70 mg/dL and high alert to 300 mg/dL. Showed him how he can adjust these as well.     Sensor was inserted with no resistance or bleeding at insertion site.    Education provided today on:  AADE Self-Care Behaviors:  Monitoring: SG vs BG and when to still test a blood reading.  Taking Medication: educated on trying some different spots for his insulin injections to see if he absorbs it better and if numbers come down at all.     CGM-specific education:   Freestyle Linda sensor: insertion technique, sensor site location and rotation, insulin administration in relation to sensor placement, sensor wear, reasons to remove sensor (MRI, CT, diathermy), Vitamin C & Aspirin effects on sensor, Linda [place]: frequency of scanning sensor, length of time data is visible, use of built in glucose meter, Precision X-tra test strips, Use of trends and graphs for pattern management and problem solving, Dosing insulin based on sensor glucose results and Libr[employer] software       Education Materials Provided:  No new materials provided today    ASSESSMENT:  Dayday did well with his sensor start today.  He reports his numbers have been higher the past 1.5 years but without any changes to his  diet or weight.  Has had more stress though in that time.     Usually uses the same spots on his abdomen for injections for insulin. Would benefit from trying some new sites such as his arms or backside/upper buttock.     Pt verbalized understanding of concepts discussed and recommendations provided today.    PLAN  See Patient Instructions for co-developed, patient-stated behavior change goals.  Phone follow up scheduled July 13.  Pt will try to upload his sensor before then.   No insulin changes made today as no numbers to review.   Linda 2 sensor started today.   Scan sensor at least every 8 hours.   AVS printed and provided to patient today. See Follow-Up section for recommended follow-up.    BROOKE Ho CDE    Time Spent: 50 minutes  Encounter Type: Individual    Any diabetes medication dose changes were made via the CDE Protocol and Collaborative Practice Agreement with the patient's referring provider. A copy of this encounter was shared with the provider.

## 2021-06-07 NOTE — LETTER
"    6/7/2021         RE: Pascual Perea  [address]  [address]  [facility] [address]        Dear Colleague,    Thank you for referring your patient, Pascual Perea, to the [facility] [place]. Please see a copy of my visit note below.          Diabetes Self-Management Education & Support    Presents for: Personal CGM Start    SUBJECTIVE/OBJECTIVE:  Presents for: Personal CGM Start  Accompanied by: Self  Diabetes education in the past 24mo: No  Focus of Visit: CGM  Diabetes type: Type 2  Disease course: Worsening  How confident are you filling out medical forms by yourself:: Not Assessed  Diabetes management related comments/concerns: In the process of moving. Stopped checking BG about a week. Morning numbers usually > 200 mg/dL.  Other concerns:: None  Cultural Influences/Ethnic Background:  Choose not to answer    Diabetes Symptoms & Complications:  Weight trend: Stable  Complications assessed today?: Yes  Heart disease: Yes  Nephropathy: Yes    Patient Problem List and Family Medical History reviewed for relevant medical history, current medical status, and diabetes risk factors.    Vitals:  There were no vitals taken for this visit.  Estimated body mass index is 29.57 kg/m  as calculated from the following:    Height as of 3/1/21: 1.803 m (5' 11\").    Weight as of 5/10/21: 96.2 kg (212 lb).   Last 3 BP:   BP Readings from Last 3 Encounters:   05/10/21 136/68   03/01/21 135/69   11/19/20 126/78       History   Smoking Status     Former Smoker     Packs/day: 1.00     Years: 6.00     Types: Cigarettes     Start date: 1958     Quit date: 7/1/1964   Smokeless Tobacco     Never Used       Labs:  Lab Results   Component Value Date    A1C 8.8 03/09/2021     Lab Results   Component Value Date     04/20/2021     Lab Results   Component Value Date    LDL  03/09/2021     Cannot estimate LDL when triglyceride exceeds 400 mg/dL    LDL 88 03/09/2021     HDL Cholesterol   Date Value Ref Range " Status   03/09/2021 49 >39 mg/dL Final   ]  GFR Estimate   Date Value Ref Range Status   04/20/2021 53 (L) >60 mL/min/[1.73_m2] Final     Comment:     Non  GFR Calc  Starting 12/18/2018, serum creatinine based estimated GFR (eGFR) will be   calculated using the Chronic Kidney Disease Epidemiology Collaboration   (CKD-EPI) equation.       GFR Estimate If Black   Date Value Ref Range Status   04/20/2021 62 >60 mL/min/[1.73_m2] Final     Comment:      GFR Calc  Starting 12/18/2018, serum creatinine based estimated GFR (eGFR) will be   calculated using the Chronic Kidney Disease Epidemiology Collaboration   (CKD-EPI) equation.       Lab Results   Component Value Date    CR 1.27 04/20/2021     No results found for: MICROALBUMIN    Healthy Eating:  Healthy Eating Assessed Today: No  Cultural/Islam diet restrictions?: No  Usually 5 carb choices per meal per pt but did not have time to discuss diet in detail today.     Being Active:  Being Active Assessed Today: No    Monitoring:  Monitoring Assessed Today: Yes  Did patient bring glucose meter to appointment? : Yes  Blood Glucose Meter: CGM started today  Blood glucose trend: Fluctuating - stopped testing his BG a week ago. FBS > 200 mg/dL prior to that usually    Taking Medications:  Diabetes Medication(s)     Insulin       insulin aspart (NOVOLOG FLEXPEN) 100 UNIT/ML pen    INJECT THREE TIMES A DAY (BEFORE MEALS) 3 UNITS PER CARB CHOICE (15 GM) APPROX 10-15 UNITS/ MEAL     insulin glargine (LANTUS PEN) 100 UNIT/ML pen    Inject 72 Units Subcutaneous At Bedtime          Taking Medication Assessed Today: Yes  Current Treatments: Insulin Injections  Given by: Patient  Injection/Infusion sites: Abdomen  Problems taking diabetes medications regularly?: No  Diabetes medication side effects?: No    Problem Solving:  Problem Solving Assessed Today: Yes  Is the patient at risk for hypoglycemia?: Yes  Hypoglycemia Frequency: Rarely  Low sx:  hungry, tired      Reducing Risks:  Reducing Risks Assessed Today: No  Diabetes Risks: Age over 45 years, Hyperlipidemia  CAD Risks: Diabetes Mellitus, Dyslipidemia, Hypertension, Male sex, Stress    Healthy Coping:  Healthy Coping Assessed Today: Yes  Emotional response to diabetes: Ready to learn  Stage of change: PREPARATION (Decided to change - considering how)  Patient Activation Measure Survey Score:  JUVE Score (Last Two) 10/14/2010   JUVE Raw Score 38   Activation Score 52.9   JUVE Level 2       Diabetes knowledge and skills assessment:   Patient is knowledgeable in diabetes management concepts related to: Being Active, Problem Solving, Reducing Risks and Healthy Coping  Patient needs further education on the following diabetes management concepts: Being Active, Monitoring and Taking Medication  Based on learning assessment above, most appropriate setting for further diabetes education would be: Group class or Individual setting.      INTERVENTIONS:  Pt brought in his Linda 2 sensor to start today. Low alert set to 70 mg/dL and high alert to 300 mg/dL. Showed him how he can adjust these as well.     Sensor was inserted with no resistance or bleeding at insertion site.    Education provided today on:  AADE Self-Care Behaviors:  Monitoring: SG vs BG and when to still test a blood reading.  Taking Medication: educated on trying some different spots for his insulin injections to see if he absorbs it better and if numbers come down at all.     CGM-specific education:   Freestyle Linda sensor: insertion technique, sensor site location and rotation, insulin administration in relation to sensor placement, sensor wear, reasons to remove sensor (MRI, CT, diathermy), Vitamin C & Aspirin effects on sensor, Linda Erhard: frequency of scanning sensor, length of time data is visible, use of built in glucose meter, Precision X-tra test strips, Use of trends and graphs for pattern management and problem solving, Dosing insulin  based on sensor glucose results and LibreVLiftopiaw software       Education Materials Provided:  No new materials provided today    ASSESSMENT:  Dayday did well with his sensor start today.  He reports his numbers have been higher the past 1.5 years but without any changes to his diet or weight.  Has had more stress though in that time.     Usually uses the same spots on his abdomen for injections for insulin. Would benefit from trying some new sites such as his arms or backside/upper buttock.     Pt verbalized understanding of concepts discussed and recommendations provided today.    PLAN  See Patient Instructions for co-developed, patient-stated behavior change goals.  Phone follow up scheduled July 13.  Pt will try to upload his sensor before then.   No insulin changes made today as no numbers to review.   Linda 2 sensor started today.   Scan sensor at least every 8 hours.   AVS printed and provided to patient today. See Follow-Up section for recommended follow-up.    BROOKE Ho CDE    Time Spent: 50 minutes  Encounter Type: Individual    Any diabetes medication dose changes were made via the CDE Protocol and Collaborative Practice Agreement with the patient's referring provider. A copy of this encounter was shared with the provider.

## 2021-06-07 NOTE — PATIENT INSTRUCTIONS
[email or web address] (website to create a Auth0 account to upload your sensor).    Remove sensor for any MRI, CT, or XRAY scans.     Change sensor every 14 days.        Bring blood glucose meter and logbook with you to all doctor and follow-up appointments.    Diabetes Education Telephone Visit Follow-up:    We realize your time is valuable and your health is important! We offer a convenient Telephone Visit follow up! It s a quick way to check in for a medication dose adjustment without having to come back to clinic as soon.    Telephone Visits are often covered by insurance. Please check with your insurance plan to see if this type of visit is covered. If not, the cost is less expensive than an office visit:      Up to 10 minutes (Code 84750): $30    11-20 minutes (Code 34310): $59    More than 20 minutes (Code 16355): $85    Talk with your Diabetes Educator if you want to learn more.      [facility]:  For Your Diabetes Education and Nutrition Appointments Call:  [phone]   For Diabetes Education or Nutrition Related Questions:   Phone: [phone]  Send Nanotion Message   If you need a medication refill please contact your pharmacy. Please allow 3 business days for your refills to be completed.

## 2021-06-07 NOTE — Clinical Note
SAMANTHA, started him on his harry 2 sensor today. No BGs to review today to help adjust insulin. Have a phone follow up next month to review his numbers.   Krystal Jones, BROOKE LD CDE

## 2021-06-10 DIAGNOSIS — E78.5 HYPERLIPIDEMIA LDL GOAL <70: ICD-10-CM

## 2021-06-10 DIAGNOSIS — E11.9 TYPE 2 DIABETES MELLITUS WITHOUT COMPLICATION, WITH LONG-TERM CURRENT USE OF INSULIN (H): ICD-10-CM

## 2021-06-10 DIAGNOSIS — N18.31 STAGE 3A CHRONIC KIDNEY DISEASE (H): ICD-10-CM

## 2021-06-10 DIAGNOSIS — Z79.4 TYPE 2 DIABETES MELLITUS WITHOUT COMPLICATION, WITH LONG-TERM CURRENT USE OF INSULIN (H): ICD-10-CM

## 2021-06-10 DIAGNOSIS — E21.3 HYPERPARATHYROIDISM (H): ICD-10-CM

## 2021-06-10 LAB
ALBUMIN SERPL-MCNC: 3.9 G/DL (ref 3.4–5)
ALP SERPL-CCNC: 90 U/L (ref 40–150)
ALT SERPL W P-5'-P-CCNC: 37 U/L (ref 0–70)
ANION GAP SERPL CALCULATED.3IONS-SCNC: 3 MMOL/L (ref 3–14)
AST SERPL W P-5'-P-CCNC: 27 U/L (ref 0–45)
BILIRUB SERPL-MCNC: 0.6 MG/DL (ref 0.2–1.3)
BUN SERPL-MCNC: 18 MG/DL (ref 7–30)
CALCIUM SERPL-MCNC: 10.1 MG/DL (ref 8.5–10.1)
CHLORIDE SERPL-SCNC: 106 MMOL/L (ref 94–109)
CHOLEST SERPL-MCNC: 141 MG/DL
CO2 SERPL-SCNC: 31 MMOL/L (ref 20–32)
CREAT SERPL-MCNC: 1.41 MG/DL (ref 0.66–1.25)
GFR SERPL CREATININE-BSD FRML MDRD: 47 ML/MIN/{1.73_M2}
GLUCOSE SERPL-MCNC: 226 MG/DL (ref 70–99)
HBA1C MFR BLD: 8.5 % (ref 0–5.6)
HDLC SERPL-MCNC: 40 MG/DL
LDLC SERPL CALC-MCNC: 57 MG/DL
NONHDLC SERPL-MCNC: 101 MG/DL
POTASSIUM SERPL-SCNC: 4.7 MMOL/L (ref 3.4–5.3)
PROT SERPL-MCNC: 6.7 G/DL (ref 6.8–8.8)
PTH-INTACT SERPL-MCNC: 80 PG/ML (ref 18–80)
SODIUM SERPL-SCNC: 140 MMOL/L (ref 133–144)
TRIGL SERPL-MCNC: 221 MG/DL

## 2021-06-10 PROCEDURE — 83970 ASSAY OF PARATHORMONE: CPT | Performed by: INTERNAL MEDICINE

## 2021-06-10 PROCEDURE — 36415 COLL VENOUS BLD VENIPUNCTURE: CPT | Performed by: INTERNAL MEDICINE

## 2021-06-10 PROCEDURE — 83036 HEMOGLOBIN GLYCOSYLATED A1C: CPT | Performed by: INTERNAL MEDICINE

## 2021-06-10 PROCEDURE — 80053 COMPREHEN METABOLIC PANEL: CPT | Performed by: INTERNAL MEDICINE

## 2021-06-10 PROCEDURE — 80061 LIPID PANEL: CPT | Performed by: INTERNAL MEDICINE

## 2021-06-17 ENCOUNTER — OFFICE VISIT (OUTPATIENT)
Dept: INTERNAL MEDICINE | Facility: CLINIC | Age: 81
End: 2021-06-17
Payer: COMMERCIAL

## 2021-06-17 VITALS
DIASTOLIC BLOOD PRESSURE: 68 MMHG | TEMPERATURE: 97.8 F | BODY MASS INDEX: 29.54 KG/M2 | HEART RATE: 88 BPM | HEIGHT: 71 IN | RESPIRATION RATE: 16 BRPM | OXYGEN SATURATION: 99 % | WEIGHT: 211 LBS | SYSTOLIC BLOOD PRESSURE: 132 MMHG

## 2021-06-17 DIAGNOSIS — R60.9 EDEMA, UNSPECIFIED TYPE: ICD-10-CM

## 2021-06-17 DIAGNOSIS — R07.9 CHEST PAIN, UNSPECIFIED TYPE: ICD-10-CM

## 2021-06-17 DIAGNOSIS — N18.32 STAGE 3B CHRONIC KIDNEY DISEASE (H): ICD-10-CM

## 2021-06-17 DIAGNOSIS — I25.119 ATHEROSCLEROSIS OF NATIVE CORONARY ARTERY OF NATIVE HEART WITH ANGINA PECTORIS (H): ICD-10-CM

## 2021-06-17 DIAGNOSIS — E11.9 TYPE 2 DIABETES MELLITUS WITHOUT COMPLICATION, WITH LONG-TERM CURRENT USE OF INSULIN (H): ICD-10-CM

## 2021-06-17 DIAGNOSIS — Z79.4 TYPE 2 DIABETES MELLITUS WITHOUT COMPLICATION, WITH LONG-TERM CURRENT USE OF INSULIN (H): ICD-10-CM

## 2021-06-17 DIAGNOSIS — M25.511 BILATERAL SHOULDER PAIN, UNSPECIFIED CHRONICITY: ICD-10-CM

## 2021-06-17 DIAGNOSIS — M25.512 BILATERAL SHOULDER PAIN, UNSPECIFIED CHRONICITY: ICD-10-CM

## 2021-06-17 LAB — TROPONIN I SERPL-MCNC: <0.015 UG/L (ref 0–0.04)

## 2021-06-17 PROCEDURE — 93000 ELECTROCARDIOGRAM COMPLETE: CPT | Performed by: INTERNAL MEDICINE

## 2021-06-17 PROCEDURE — 84484 ASSAY OF TROPONIN QUANT: CPT | Performed by: INTERNAL MEDICINE

## 2021-06-17 PROCEDURE — 99215 OFFICE O/P EST HI 40 MIN: CPT | Performed by: INTERNAL MEDICINE

## 2021-06-17 PROCEDURE — 36415 COLL VENOUS BLD VENIPUNCTURE: CPT | Performed by: INTERNAL MEDICINE

## 2021-06-17 RX ORDER — FUROSEMIDE 20 MG
20 TABLET ORAL EVERY OTHER DAY
Qty: 90 TABLET | Refills: 1
Start: 2021-06-17 | End: 2021-09-15

## 2021-06-17 RX ORDER — NITROGLYCERIN 0.4 MG/1
0.4 TABLET SUBLINGUAL EVERY 5 MIN PRN
Qty: 25 TABLET | Refills: 1 | Status: SHIPPED | OUTPATIENT
Start: 2021-06-17 | End: 2022-12-15

## 2021-06-17 ASSESSMENT — MIFFLIN-ST. JEOR: SCORE: 1694.22

## 2021-06-17 NOTE — PROGRESS NOTES
ASSESSMENT:   1. Type 2 diabetes mellitus without complication, with long-term current use of insulin (H)  Overall diabetic control improved slightly with A1c now 8.5 but still above goal.  Patient having low blood sugars in the middle the night using NovoLog at bedtime.  Needs greater control during the day.  Patient increase NovoLog with meals as per plan discussion below and reduce Lantus slightly.  Patient was instructed not to take NovoLog at bedtime.  Recheck A1c 3 months  - Hemoglobin A1c; Future    2. Atherosclerosis of native coronary artery of native heart with angina pectoris (H)  History of coronary disease with somewhat atypical chest pain but with history of coronary disease, nitroglycerin reviewed and stress testing ordered as below  - nitroGLYcerin (NITROSTAT) 0.4 MG sublingual tablet; Place 1 tablet (0.4 mg) under the tongue every 5 minutes as needed for chest pain  Dispense: 25 tablet; Refill: 1    3. Stage 3b chronic kidney disease  Renal function worsened some.  Possibly related to dehydration issue with furosemide.  Will decrease dose to 20 mg every other day and patient will watch his weight carefully.  Recheck lab in a couple weeks  - Basic metabolic panel; Future    4. Chest pain, unspecified type  EKG without acute changes besides T wave inversion in aVL.  With CAD history, will obtain cardiac stress test.  Troponin drawn today and normal  - EKG 12-lead complete w/read - Clinics  - NM MPI Treadmill; Future  - Troponin I    5. Bilateral shoulder pain, unspecified chronicity  Minimal tenderness with abduction.  Possible side effect of fenofibrate versus rotator cuff issues.  Sed rate to rule out any component of PMR.  If lab okay and stress test okay, will have patient hold fenofibrate to see if contributing as a side effect  - Erythrocyte sedimentation rate auto; Future    6. Edema, unspecified type  Controlled.  Patient to reduce furosemide dosage as above as long as edema and weight  remains stable  - furosemide (LASIX) 20 MG tablet; Take 1 tablet (20 mg) by mouth every other day  Dispense: 90 tablet; Refill: 1    PLAN:   Add 1 unit of Novolog with breakfast and lunch and  2 units with supper to the usual calculated dose  Do not take Novolog at bedtime   Reduce Lantus to 70 units at bedtime  Reduce Furosemide/Lasix to 20mg tab. 1 tab every other day for leg swelling  Weight self daily. If weight up 2 pounds in 1  day or 5 pounds in a week, then go back to Furosemide 20mg daily and contact  Clinic via [facility] that had to increase dosage   Call  [phone] or use [facility] to schedule a future lab appointment  non-fasting in 2 weeks for kidney, sed rate   Nonfasting diabetes A1C lab in 3 months   Continue other  Medications  EKG   Lab test for troponin   Stress test heart at [facility]. Hold your Metoprolol the day before and the day of the test. Restart after test as usual   If increased chest pain, then take nitroglycerin and, if not helping and chest pain worsens, then be seen in ER        (Chart documentation was completed, in part, with MobileHelp voice-recognition software. Even though reviewed, some grammatical, spelling, and word errors may remain.)    Andrew Elizalde MD  Internal Medicine Department  [facility]      Max Naylor is a 80 year old who presents for the following health issues     HPI     Diabetes Follow-up    How often are you checking your blood sugar? Continuous glucose monitor  What time of day are you checking your blood sugars (select all that apply)?  Before meals and At bedtime  Have you had any blood sugars above 200?  Yes mostly in the Evening  Have you had any blood sugars below 70?  No    What symptoms do you notice when your blood sugar is low?  Shaky, Dizzy and Weak    What concerns do you have today about your diabetes?  Blood sugar is often over 200     Do you have any of these symptoms? (Select all that apply)  Numbness in  feet              Hyperlipidemia Follow-Up      Are you regularly taking any medication or supplement to lower your cholesterol?   Yes- Fenofibrate    Are you having muscle aches or other side effects that you think could be caused by your cholesterol lowering medication?  Yes- Patient has trouble standing at time    Hypertension Follow-up      Do you check your blood pressure regularly outside of the clinic? No     Are you following a low salt diet? Yes    Are your blood pressures ever more than 140 on the top number (systolic) OR more   than 90 on the bottom number (diastolic), for example 140/90? Unknown, WNL in Clinic    BP Readings from Last 2 Encounters:   05/10/21 136/68   03/01/21 135/69     Hemoglobin A1C (%)   Date Value   06/10/2021 8.5 (H)   03/09/2021 8.8 (H)     LDL Cholesterol Calculated (mg/dL)   Date Value   06/10/2021 57   03/09/2021     Cannot estimate LDL when triglyceride exceeds 400 mg/dL     LDL Cholesterol Direct (mg/dL)   Date Value   03/09/2021 88     Most recent lab results reviewed with pt.      Component      Latest Ref Rng & Units 3/9/2021   Sodium      133 - 144 mmol/L 136   Potassium      3.4 - 5.3 mmol/L 4.6   Chloride      94 - 109 mmol/L 104   Carbon Dioxide      20 - 32 mmol/L 30   Anion Gap      3 - 14 mmol/L 2 (L)   Glucose      70 - 99 mg/dL 190 (H)   Urea Nitrogen      7 - 30 mg/dL 18   Creatinine      0.66 - 1.25 mg/dL 1.22   GFR Estimate      >60 mL/min/1.73:m2 56 (L)   GFR Estimate If Black      >60 mL/min/1.73:m2 65   Calcium      8.5 - 10.1 mg/dL 11.3 (H)   Bilirubin Total      0.2 - 1.3 mg/dL 0.9   Albumin      3.4 - 5.0 g/dL 4.3   Protein Total      6.8 - 8.8 g/dL 7.6   Alkaline Phosphatase      40 - 150 U/L 133   ALT      0 - 70 U/L 73 (H)   AST      0 - 45 U/L 34   Cholesterol      <200 mg/dL 212 (H)   Triglycerides      <150 mg/dL 499 (H)   HDL Cholesterol      >39 mg/dL 49   LDL Cholesterol Calculated      <100 mg/dL Cannot estimate LDL when triglyceride exceeds 400  mg/dL   Non HDL Cholesterol      <130 mg/dL 163 (H)   Hemoglobin A1C      0 - 5.6 % 8.8 (H)   LDL Cholesterol Direct      <100 mg/dL 88   Parathyroid Hormone Intact      18 - 80 pg/mL      Component      Latest Ref Rng & Units 4/20/2021 6/10/2021   Sodium      133 - 144 mmol/L 137 140   Potassium      3.4 - 5.3 mmol/L 4.6 4.7   Chloride      94 - 109 mmol/L 105 106   Carbon Dioxide      20 - 32 mmol/L 29 31   Anion Gap      3 - 14 mmol/L 3 3   Glucose      70 - 99 mg/dL 240 (H) 226 (H)   Urea Nitrogen      7 - 30 mg/dL 18 18   Creatinine      0.66 - 1.25 mg/dL 1.27 (H) 1.41 (H)   GFR Estimate      >60 mL/min/1.73:m2 53 (L) 47 (L)   GFR Estimate If Black      >60 mL/min/1.73:m2 62 54 (L)   Calcium      8.5 - 10.1 mg/dL 10.0 10.1   Bilirubin Total      0.2 - 1.3 mg/dL 1.0 0.6   Albumin      3.4 - 5.0 g/dL 3.8 3.9   Protein Total      6.8 - 8.8 g/dL 6.7 (L) 6.7 (L)   Alkaline Phosphatase      40 - 150 U/L 119 90   ALT      0 - 70 U/L 54 37   AST      0 - 45 U/L 34 27   Cholesterol      <200 mg/dL  141   Triglycerides      <150 mg/dL  221 (H)   HDL Cholesterol      >39 mg/dL  40   LDL Cholesterol Calculated      <100 mg/dL  57   Non HDL Cholesterol      <130 mg/dL  101   Hemoglobin A1C      0 - 5.6 %  8.5 (H)   LDL Cholesterol Direct      <100 mg/dL     Parathyroid Hormone Intact      18 - 80 pg/mL  80          Blood sugars:  120,209,214,322  107,171,135,218  139,128,260,194  133,187,265,384  150    Review the continuous glucose monitor graphs show patient frequently having blood sugar dropped into the low 60s in the middle of the night.  Patient has been taking corrective NovoLog insulin at bedtime however having blood sugars elevated as above.  Denies  SOB, abdominal pain, polyuria, polydipsia, vision changes, extremity numbness/parasthesias or skin problems.  History of coronary artery disease.  Patient has been having some atypical chest pain in the left chest for couple days.  Sometimes worsened with exertion and  "sometimes at rest.  Minimal symptoms present currently but still having.  Patient mentioned this at the very end of the appointment as I was getting ready to go so this cannot patient's initial primary concern.  Patient denies any trauma.  No change with eating foods.  No associated nausea or vomiting.  Patient does note some mild achiness in his bilateral shoulders also.  Thinks this is been present for a couple weeks.  Was started on fenofibrate 1 month ago.  Denies weakness in the upper extremities.  No achiness in the hips  Additional ROS:   Constitutional, HEENT, Cardiovascular, Pulmonary, GI and , Neuro, MSK and Psych review of systems/symptoms are otherwise negative or unchanged from previous, except as noted above.      OBJECTIVE:  /68   Pulse 88   Temp 97.8  F (36.6  C) (Temporal)   Resp 16   Ht 1.803 m (5' 11\")   Wt 95.7 kg (211 lb)   SpO2 99%   BMI 29.43 kg/m     Estimated body mass index is 29.43 kg/m  as calculated from the following:    Height as of this encounter: 1.803 m (5' 11\").    Weight as of this encounter: 95.7 kg (211 lb).     Neck: no adenopathy. Thyroid normal to palpation. No bruits  Pulm: Lungs clear to auscultation   CV: Regular rates and rhythm  GI: Soft, nontender, Normal active bowel sounds, No hepatosplenomegaly or masses palpable  Ext: Peripheral pulses intact. Minimal BLE (R>L) edema.  Minimal tenderness to full abduction bilateral shoulders  Neuro: Normal strength and tone, sensory exam grossly normal    EKG: Sinus rhythm with first-degree AV block and ID interval 280 ms.  Voltage criteria for LVH.  New T wave inversion in lead aVL.  Other T wave inversions unchanged         "

## 2021-06-17 NOTE — PATIENT INSTRUCTIONS
Add 1 unit of Novolog with breakfast and lunch and  2 units with supper to the usual calculated dose  Do not take Novolog at bedtime   Reduce Lantus to 70 units at bedtime  Reduce Furosemide/Lasix to 20mg tab. 1 tab every other day for leg swelling  Weight self daily. If weight up 2 pounds in 1  day or 5 pounds in a week, then go back to Furosemide 20mg daily and contact  Clinic via [email or web address] that had to increase dosage   Call  [phone] or use [email or web address] to schedule a future lab appointment  non-fasting in 2 weeks for kidney, sed rate   Nonfasting diabetes A1C lab in 3 months   Continue other  Medications  EKG   Lab test for troponin   Stress test heart at [facility]. Hold your Metoprolol the day before and the day of the test. Restart after test as usual   If increased chest pain, then take nitroglycerin and, if not helping and chest pain worsens, then be seen in ER

## 2021-06-18 ENCOUNTER — HOSPITAL ENCOUNTER (OUTPATIENT)
Dept: NUCLEAR MEDICINE | Facility: CLINIC | Age: 81
Setting detail: NUCLEAR MEDICINE
End: 2021-06-18
Attending: INTERNAL MEDICINE | Admitting: INTERNAL MEDICINE
Payer: COMMERCIAL

## 2021-06-18 ENCOUNTER — HOSPITAL ENCOUNTER (OUTPATIENT)
Facility: CLINIC | Age: 81
Discharge: HOME | End: 2021-06-18
Attending: INTERNAL MEDICINE | Admitting: INTERNAL MEDICINE
Payer: COMMERCIAL

## 2021-06-18 ENCOUNTER — HOSPITAL ENCOUNTER (OUTPATIENT)
Dept: CARDIOLOGY | Facility: CLINIC | Age: 81
End: 2021-06-18
Attending: INTERNAL MEDICINE | Admitting: INTERNAL MEDICINE
Payer: COMMERCIAL

## 2021-06-18 DIAGNOSIS — R07.9 CHEST PAIN, UNSPECIFIED TYPE: ICD-10-CM

## 2021-06-18 LAB
CV STRESS MAX HR HE: 122
NUC STRESS EJECTION FRACTION: 74 %
RATE PRESSURE PRODUCT: NORMAL
STRESS ANGINA INDEX: 0
STRESS ECHO BASELINE DIASTOLIC HE: 60
STRESS ECHO BASELINE HR: 71
STRESS ECHO BASELINE SYSTOLIC BP: 116
STRESS ECHO CALCULATED PERCENT HR: 87 %
STRESS ECHO LAST STRESS DIASTOLIC BP: 60
STRESS ECHO LAST STRESS SYSTOLIC BP: 140
STRESS ECHO POST ESTIMATED WORKLOAD: 4.6 METS
STRESS ECHO POST EXERCISE DUR MIN: 7 MIN
STRESS ECHO POST EXERCISE DUR SEC: 15 SEC
STRESS ECHO TARGET HR: 141

## 2021-06-18 PROCEDURE — 93016 CV STRESS TEST SUPVJ ONLY: CPT | Performed by: INTERNAL MEDICINE

## 2021-06-18 PROCEDURE — A9502 TC99M TETROFOSMIN: HCPCS | Performed by: INTERNAL MEDICINE

## 2021-06-18 PROCEDURE — 93018 CV STRESS TEST I&R ONLY: CPT | Performed by: INTERNAL MEDICINE

## 2021-06-18 PROCEDURE — 93017 CV STRESS TEST TRACING ONLY: CPT

## 2021-06-18 PROCEDURE — 78452 HT MUSCLE IMAGE SPECT MULT: CPT | Mod: 26 | Performed by: INTERNAL MEDICINE

## 2021-06-18 PROCEDURE — 343N000001 HC RX 343: Performed by: INTERNAL MEDICINE

## 2021-06-18 PROCEDURE — 78452 HT MUSCLE IMAGE SPECT MULT: CPT

## 2021-06-18 RX ADMIN — TETROFOSMIN 10 MCI.: 1.38 INJECTION, POWDER, LYOPHILIZED, FOR SOLUTION INTRAVENOUS at 10:03

## 2021-06-18 RX ADMIN — TETROFOSMIN 31.1 MCI.: 1.38 INJECTION, POWDER, LYOPHILIZED, FOR SOLUTION INTRAVENOUS at 11:53

## 2021-06-21 DIAGNOSIS — K21.9 GASTROESOPHAGEAL REFLUX DISEASE WITHOUT ESOPHAGITIS: ICD-10-CM

## 2021-06-21 RX ORDER — FAMOTIDINE 20 MG/1
TABLET, FILM COATED ORAL
Qty: 180 TABLET | Refills: 2 | Status: SHIPPED | OUTPATIENT
Start: 2021-06-21 | End: 2021-09-14 | Stop reason: ALTCHOICE

## 2021-07-01 DIAGNOSIS — M25.512 BILATERAL SHOULDER PAIN, UNSPECIFIED CHRONICITY: ICD-10-CM

## 2021-07-01 DIAGNOSIS — M25.511 BILATERAL SHOULDER PAIN, UNSPECIFIED CHRONICITY: ICD-10-CM

## 2021-07-01 DIAGNOSIS — N18.32 STAGE 3B CHRONIC KIDNEY DISEASE (H): ICD-10-CM

## 2021-07-01 LAB
ANION GAP SERPL CALCULATED.3IONS-SCNC: 5 MMOL/L (ref 3–14)
BUN SERPL-MCNC: 20 MG/DL (ref 7–30)
CALCIUM SERPL-MCNC: 10.6 MG/DL (ref 8.5–10.1)
CHLORIDE SERPL-SCNC: 106 MMOL/L (ref 94–109)
CO2 SERPL-SCNC: 29 MMOL/L (ref 20–32)
CREAT SERPL-MCNC: 1.69 MG/DL (ref 0.66–1.25)
ERYTHROCYTE [SEDIMENTATION RATE] IN BLOOD BY WESTERGREN METHOD: 7 MM/H (ref 0–20)
GFR SERPL CREATININE-BSD FRML MDRD: 38 ML/MIN/{1.73_M2}
GLUCOSE SERPL-MCNC: 208 MG/DL (ref 70–99)
POTASSIUM SERPL-SCNC: 4.1 MMOL/L (ref 3.4–5.3)
SODIUM SERPL-SCNC: 140 MMOL/L (ref 133–144)

## 2021-07-01 PROCEDURE — 36415 COLL VENOUS BLD VENIPUNCTURE: CPT | Performed by: INTERNAL MEDICINE

## 2021-07-01 PROCEDURE — 80048 BASIC METABOLIC PNL TOTAL CA: CPT | Performed by: INTERNAL MEDICINE

## 2021-07-01 PROCEDURE — 85652 RBC SED RATE AUTOMATED: CPT | Performed by: INTERNAL MEDICINE

## 2021-07-13 ENCOUNTER — VIRTUAL VISIT (OUTPATIENT)
Dept: EDUCATION SERVICES | Facility: CLINIC | Age: 81
End: 2021-07-13
Payer: COMMERCIAL

## 2021-07-13 DIAGNOSIS — Z79.4 TYPE 2 DIABETES MELLITUS WITHOUT COMPLICATION, WITH LONG-TERM CURRENT USE OF INSULIN (H): Primary | ICD-10-CM

## 2021-07-13 DIAGNOSIS — I48.3 TYPICAL ATRIAL FLUTTER (H): ICD-10-CM

## 2021-07-13 DIAGNOSIS — E11.9 TYPE 2 DIABETES MELLITUS WITHOUT COMPLICATION, WITH LONG-TERM CURRENT USE OF INSULIN (H): Primary | ICD-10-CM

## 2021-07-13 PROCEDURE — 98966 PH1 ASSMT&MGMT NQHP 5-10: CPT | Mod: 95

## 2021-07-13 NOTE — PROGRESS NOTES
"Diabetes Self-Management Education & Support    Presents for: CGM Review  Type of Service: Telephone Visit    How would patient like to obtain AVS? Jennifer        SUBJECTIVE/OBJECTIVE:  Presents for: CGM Review  Accompanied by: Self  Diabetes education in the past 24mo: No  Focus of Visit: CGM  Type of CGM visit: Personal CGM Follow-up  Diabetes type: Type 2  Disease course: Improving  How confident are you filling out medical forms by yourself:: Not Assessed  Diabetes management related comments/concerns: Liking his sensor. Is waking up < 150 mg/dL usually. No more low blood sugars overnight since having his insulin reduced a bit at his last PCP visit.  Other concerns:: None  Cultural Influences/Ethnic Background:  Choose not to answer    Diabetes Symptoms & Complications:  Weight trend: Stable  Complications assessed today?: Yes  Heart disease: Yes  Nephropathy: Yes    Patient Problem List and Family Medical History reviewed for relevant medical history, current medical status, and diabetes risk factors.    Vitals:  There were no vitals taken for this visit.  Estimated body mass index is 29.43 kg/m  as calculated from the following:    Height as of 6/17/21: 1.803 m (5' 11\").    Weight as of 6/17/21: 95.7 kg (211 lb).     Last 3 BP:   BP Readings from Last 3 Encounters:   06/17/21 132/68   05/10/21 136/68   03/01/21 135/69       History   Smoking Status     Former Smoker     Packs/day: 1.00     Years: 6.00     Types: Cigarettes     Start date: 1958     Quit date: 7/1/1964   Smokeless Tobacco     Never Used       Labs:  Lab Results   Component Value Date    A1C 8.5 06/10/2021     Lab Results   Component Value Date     07/01/2021     Lab Results   Component Value Date    LDL 57 06/10/2021     HDL Cholesterol   Date Value Ref Range Status   06/10/2021 40 >39 mg/dL Final   ]  GFR Estimate   Date Value Ref Range Status   07/01/2021 38 (L) >60 mL/min/[1.73_m2] Final     Comment:     Non  GFR " Calc  Starting 12/18/2018, serum creatinine based estimated GFR (eGFR) will be   calculated using the Chronic Kidney Disease Epidemiology Collaboration   (CKD-EPI) equation.       GFR Estimate If Black   Date Value Ref Range Status   07/01/2021 44 (L) >60 mL/min/[1.73_m2] Final     Comment:      GFR Calc  Starting 12/18/2018, serum creatinine based estimated GFR (eGFR) will be   calculated using the Chronic Kidney Disease Epidemiology Collaboration   (CKD-EPI) equation.       Lab Results   Component Value Date    CR 1.69 07/01/2021     No results found for: MICROALBUMIN    Healthy Eating:  Healthy Eating Assessed Today: Yes  Cultural/Anglican diet restrictions?: No  Reports he is more mindful of his diet with the sensor and it is helping him learn how different meals affect his numbers.    Being Active:  Being Active Assessed Today: No    Monitoring:  Monitoring Assessed Today: Yes  Did patient bring glucose meter to appointment? : Yes  Blood Glucose Meter: CGM  Blood glucose trend: Decreasing    Taking Medications:  Diabetes Medication(s)     Insulin       insulin aspart (NOVOLOG FLEXPEN) 100 UNIT/ML pen    INJECT THREE TIMES A DAY (BEFORE MEALS) 3 UNITS PER CARB CHOICE (15 GM) APPROX 10-15 UNITS/ MEAL     insulin glargine (LANTUS PEN) 100 UNIT/ML pen    Inject 72 Units Subcutaneous At Bedtime          Taking Medication Assessed Today: Yes  Current Treatments: Insulin Injections  Given by: Patient  Injection/Infusion sites: Abdomen  Problems taking diabetes medications regularly?: No  Diabetes medication side effects?: No    Problem Solving:  Problem Solving Assessed Today: Yes  Is the patient at risk for hypoglycemia?: Yes  Hypoglycemia Frequency: Rarely       Reducing Risks:  Reducing Risks Assessed Today: No  Diabetes Risks: Age over 45 years, Hyperlipidemia  CAD Risks: Diabetes Mellitus, Dyslipidemia, Hypertension, Male sex, Stress    Healthy Coping:  Healthy Coping Assessed Today:  Yes  Emotional response to diabetes: Ready to learn, Concern for health and well-being  Stage of change: ACTION (Actively working towards change)  Patient Activation Measure Survey Score:  UJVE Score (Last Two) 10/14/2010   JUVE Raw Score 38   Activation Score 52.9   JUVE Level 2       Diabetes knowledge and skills assessment:   Patient is knowledgeable in diabetes management concepts related to: Being Active, Taking Medication, Problem Solving, Reducing Risks and Healthy Coping    Patient needs further education on the following diabetes management concepts: Healthy Eating and Monitoring    Based on learning assessment above, most appropriate setting for further diabetes education would be: Individual setting.      INTERVENTIONS:    REPORTS:  No reports to review as he has not been able to upload his reader yet.     Education provided today on:  JUDITH Self-Care Behaviors:  Monitoring: Discussed that he can create an account with harry (sent him the website via [employer]) and then can just plug in his reader to upload it.   Taking Medication: Discussed that we are happy to help adjust his insulin if needed.     Pt verbalized understanding of concepts discussed and recommendations provided today.       Education Materials Provided:  No new materials provided today    ASSESSMENT:  Dayday's reported blood sugars are improving nicely since our last visit. Last month he reported they were all > 200 when he would check.  Now, he reports they are always < 150 mg/dL in the morning.  Was having some overnight lows but PCP lowered his Lantus a bit and discussed not doing novolog at bedtime which has helped. Pt reports no further low blood sugars.     His main concern today was that his sensor sometimes loses connection. Discussed keeping it on him and within 20 feet to help. He reports it is usually in his pocket. Suggested trying the pocket on the same side as his sensor.      Otherwise, he is happy with not poking his finger as much  and is liking learning about his food. Suggested a follow up to review his harry data on the phone/discuss diet.  Reports he would just like to follow up as needed and will reach out if needed.     PLAN  See Patient Instructions for co-developed, patient-stated behavior change goals.   No insulin changes made today.   Continue use of the harry 2 sensor.   Try uploading sensor and update me so I can link to his account and see his reports to see if any other insulin changes would be helpful.   AVS printed and provided to patient today. See Follow-Up section for recommended follow-up.    BROOKE Ho CDE    Time Spent: 10 minutes  Encounter Type: Individual    Any diabetes medication dose changes were made via the CDE Protocol and Collaborative Practice Agreement with the patient's referring provider. A copy of this encounter was shared with the provider.

## 2021-07-13 NOTE — PATIENT INSTRUCTIONS
Diabetes Support Resources:  Websites: American Diabetes Association: www.diabetes.org       Linda website to upload:  [email or web address]    Let us know if you have questions on your sensor, diet, insulin (or anything else diabetes) as we are happy to help!      Bring blood glucose meter and logbook with you to all doctor and follow-up appointments.    Diabetes Education Telephone Visit Follow-up:    We realize your time is valuable and your health is important! We offer a convenient Telephone Visit follow up! It s a quick way to check in for a medication dose adjustment without having to come back to clinic as soon.    Telephone Visits are often covered by insurance. Please check with your insurance plan to see if this type of visit is covered. If not, the cost is less expensive than an office visit:      Up to 10 minutes (Code 52892): $30    11-20 minutes (Code 59887): $59    More than 20 minutes (Code 69311): $85    Talk with your Diabetes Educator if you want to learn more.      [facility]:  For Your Diabetes Education and Nutrition Appointments Call:  [phone]   For Diabetes Education or Nutrition Related Questions:   Phone: [phone]  Send [employer] Message   If you need a medication refill please contact your pharmacy. Please allow 3 business days for your refills to be completed.

## 2021-07-26 DIAGNOSIS — I48.0 PAROXYSMAL ATRIAL FIBRILLATION (H): ICD-10-CM

## 2021-07-27 NOTE — TELEPHONE ENCOUNTER
Routing refill request to provider for review/approval because:  Drug not on the [facility] refill protocol   Failed creatinine   Creatinine   Date Value Ref Range Status   07/01/2021 1.69 (H) 0.66 - 1.25 mg/dL Final   06/10/2021 1.41 (H) 0.66 - 1.25 mg/dL Final   04/20/2021 1.27 (H) 0.66 - 1.25 mg/dL Final   03/09/2021 1.22 0.66 - 1.25 mg/dL Final   11/13/2020 1.16 0.66 - 1.25 mg/dL Final   09/25/2020 1.22 0.66 - 1.25 mg/dL Final       Eric Price, RN  [facility] Triage Nurse

## 2021-08-01 RX ORDER — LISINOPRIL 30 MG/1
TABLET ORAL
Qty: 90 TABLET | Refills: 2 | OUTPATIENT
Start: 2021-08-01

## 2021-08-02 DIAGNOSIS — N18.32 STAGE 3B CHRONIC KIDNEY DISEASE (H): Primary | ICD-10-CM

## 2021-08-02 DIAGNOSIS — E83.52 HYPERCALCEMIA: ICD-10-CM

## 2021-08-02 DIAGNOSIS — I48.0 PAROXYSMAL ATRIAL FIBRILLATION (H): ICD-10-CM

## 2021-08-02 NOTE — TELEPHONE ENCOUNTER
Per previous notes, I see this was marked as a duplicate request but I don't see that anything was prescribed since November 2020

## 2021-08-03 NOTE — TELEPHONE ENCOUNTER
Failed protocol.  please route to  team if patient needs an appointment     Meri VENTURARN BSN  [facility]  [phone]

## 2021-08-06 RX ORDER — LISINOPRIL 30 MG/1
30 TABLET ORAL DAILY
Qty: 30 TABLET | Refills: 0 | Status: SHIPPED | OUTPATIENT
Start: 2021-08-06 | End: 2021-08-06 | Stop reason: SINTOL

## 2021-08-06 NOTE — TELEPHONE ENCOUNTER
Spoke with patient and reviewed labs from 7/1/2021 with worsened renal function recurrent mild hypercalcemia.  Patient on furosemide 20 mg every other day.?  Whether that has worsened renal function versus use of ACE inhibitor.  Will have patient try stopping lisinopril use for now and recheck BMP, PTH and SPEP the next week nonfasting.  Lab orders placed.  Patient will stop taking lisinopril for now.  Will make further management decisions based on lab results.  If renal function remains worsened, will then get renal ultrasound and have patient stop furosemide use instead

## 2021-08-06 NOTE — TELEPHONE ENCOUNTER
Patient at pharmacy and states he is out of meds.  Filling a one month supply with note to follow up with provider for more refills.    Alvin Hayden, PharmD  [facility]  [phone]

## 2021-08-10 ENCOUNTER — LAB (OUTPATIENT)
Dept: LAB | Facility: CLINIC | Age: 81
End: 2021-08-10
Payer: COMMERCIAL

## 2021-08-10 DIAGNOSIS — N18.32 STAGE 3B CHRONIC KIDNEY DISEASE (H): ICD-10-CM

## 2021-08-10 DIAGNOSIS — E83.52 HYPERCALCEMIA: ICD-10-CM

## 2021-08-10 LAB
ANION GAP SERPL CALCULATED.3IONS-SCNC: 4 MMOL/L (ref 3–14)
BUN SERPL-MCNC: 30 MG/DL (ref 7–30)
CALCIUM SERPL-MCNC: 10.8 MG/DL (ref 8.5–10.1)
CHLORIDE BLD-SCNC: 105 MMOL/L (ref 94–109)
CO2 SERPL-SCNC: 30 MMOL/L (ref 20–32)
CREAT SERPL-MCNC: 1.8 MG/DL (ref 0.66–1.25)
GFR SERPL CREATININE-BSD FRML MDRD: 35 ML/MIN/1.73M2
GLUCOSE BLD-MCNC: 112 MG/DL (ref 70–99)
POTASSIUM BLD-SCNC: 4.2 MMOL/L (ref 3.4–5.3)
PTH-INTACT SERPL-MCNC: 62 PG/ML (ref 18–80)
SODIUM SERPL-SCNC: 139 MMOL/L (ref 133–144)
TOTAL PROTEIN SERUM FOR ELP: 6.6 G/DL (ref 6.8–8.8)

## 2021-08-10 PROCEDURE — 84155 ASSAY OF PROTEIN SERUM: CPT

## 2021-08-10 PROCEDURE — 83970 ASSAY OF PARATHORMONE: CPT

## 2021-08-10 PROCEDURE — 36415 COLL VENOUS BLD VENIPUNCTURE: CPT

## 2021-08-10 PROCEDURE — 84165 PROTEIN E-PHORESIS SERUM: CPT | Performed by: PATHOLOGY

## 2021-08-10 PROCEDURE — 80048 BASIC METABOLIC PNL TOTAL CA: CPT

## 2021-08-11 LAB
ALBUMIN SERPL ELPH-MCNC: 4.3 G/DL (ref 3.7–5.1)
ALPHA1 GLOB SERPL ELPH-MCNC: 0.3 G/DL (ref 0.2–0.4)
ALPHA2 GLOB SERPL ELPH-MCNC: 0.6 G/DL (ref 0.5–0.9)
B-GLOBULIN SERPL ELPH-MCNC: 0.7 G/DL (ref 0.6–1)
GAMMA GLOB SERPL ELPH-MCNC: 0.6 G/DL (ref 0.7–1.6)
M PROTEIN SERPL ELPH-MCNC: 0 G/DL
PROT PATTERN SERPL ELPH-IMP: ABNORMAL

## 2021-09-13 DIAGNOSIS — I48.0 PAROXYSMAL ATRIAL FIBRILLATION (H): ICD-10-CM

## 2021-09-14 ENCOUNTER — OFFICE VISIT (OUTPATIENT)
Dept: INTERNAL MEDICINE | Facility: CLINIC | Age: 81
End: 2021-09-14
Payer: COMMERCIAL

## 2021-09-14 ENCOUNTER — ANCILLARY PROCEDURE (OUTPATIENT)
Dept: GENERAL RADIOLOGY | Facility: CLINIC | Age: 81
End: 2021-09-14
Attending: INTERNAL MEDICINE
Payer: COMMERCIAL

## 2021-09-14 VITALS
DIASTOLIC BLOOD PRESSURE: 62 MMHG | OXYGEN SATURATION: 100 % | HEIGHT: 71 IN | HEART RATE: 62 BPM | SYSTOLIC BLOOD PRESSURE: 122 MMHG | TEMPERATURE: 97.1 F | WEIGHT: 212 LBS | BODY MASS INDEX: 29.68 KG/M2 | RESPIRATION RATE: 16 BRPM

## 2021-09-14 DIAGNOSIS — Z12.5 SCREENING FOR PROSTATE CANCER: ICD-10-CM

## 2021-09-14 DIAGNOSIS — Z00.00 MEDICARE ANNUAL WELLNESS VISIT, SUBSEQUENT: ICD-10-CM

## 2021-09-14 DIAGNOSIS — R06.02 SOB (SHORTNESS OF BREATH): ICD-10-CM

## 2021-09-14 DIAGNOSIS — N18.32 STAGE 3B CHRONIC KIDNEY DISEASE (H): ICD-10-CM

## 2021-09-14 DIAGNOSIS — E03.9 HYPOTHYROIDISM, UNSPECIFIED TYPE: ICD-10-CM

## 2021-09-14 DIAGNOSIS — E83.52 HYPERCALCEMIA: ICD-10-CM

## 2021-09-14 DIAGNOSIS — K21.9 GASTROESOPHAGEAL REFLUX DISEASE WITHOUT ESOPHAGITIS: ICD-10-CM

## 2021-09-14 DIAGNOSIS — Z79.4 TYPE 2 DIABETES MELLITUS WITHOUT COMPLICATION, WITH LONG-TERM CURRENT USE OF INSULIN (H): ICD-10-CM

## 2021-09-14 DIAGNOSIS — E11.9 TYPE 2 DIABETES MELLITUS WITHOUT COMPLICATION, WITH LONG-TERM CURRENT USE OF INSULIN (H): ICD-10-CM

## 2021-09-14 LAB
ANION GAP SERPL CALCULATED.3IONS-SCNC: 2 MMOL/L (ref 3–14)
BUN SERPL-MCNC: 37 MG/DL (ref 7–30)
CALCIUM SERPL-MCNC: 10.1 MG/DL (ref 8.5–10.1)
CHLORIDE BLD-SCNC: 106 MMOL/L (ref 94–109)
CO2 SERPL-SCNC: 29 MMOL/L (ref 20–32)
CREAT SERPL-MCNC: 1.82 MG/DL (ref 0.66–1.25)
ERYTHROCYTE [SEDIMENTATION RATE] IN BLOOD BY WESTERGREN METHOD: 6 MM/HR (ref 0–20)
GFR SERPL CREATININE-BSD FRML MDRD: 34 ML/MIN/1.73M2
GLUCOSE BLD-MCNC: 163 MG/DL (ref 70–99)
HBA1C MFR BLD: 7.1 % (ref 0–5.6)
LDH SERPL L TO P-CCNC: 131 U/L (ref 85–227)
POTASSIUM BLD-SCNC: 4.4 MMOL/L (ref 3.4–5.3)
PSA SERPL-MCNC: 0.36 UG/L (ref 0–4)
PTH-INTACT SERPL-MCNC: 46 PG/ML (ref 18–80)
SODIUM SERPL-SCNC: 137 MMOL/L (ref 133–144)

## 2021-09-14 PROCEDURE — 99397 PER PM REEVAL EST PAT 65+ YR: CPT | Performed by: INTERNAL MEDICINE

## 2021-09-14 PROCEDURE — 85652 RBC SED RATE AUTOMATED: CPT | Performed by: INTERNAL MEDICINE

## 2021-09-14 PROCEDURE — 83970 ASSAY OF PARATHORMONE: CPT | Performed by: INTERNAL MEDICINE

## 2021-09-14 PROCEDURE — 80048 BASIC METABOLIC PNL TOTAL CA: CPT | Performed by: INTERNAL MEDICINE

## 2021-09-14 PROCEDURE — 83036 HEMOGLOBIN GLYCOSYLATED A1C: CPT | Performed by: INTERNAL MEDICINE

## 2021-09-14 PROCEDURE — G0103 PSA SCREENING: HCPCS | Performed by: INTERNAL MEDICINE

## 2021-09-14 PROCEDURE — 36415 COLL VENOUS BLD VENIPUNCTURE: CPT | Performed by: INTERNAL MEDICINE

## 2021-09-14 PROCEDURE — 99214 OFFICE O/P EST MOD 30 MIN: CPT | Mod: 25 | Performed by: INTERNAL MEDICINE

## 2021-09-14 PROCEDURE — 71046 X-RAY EXAM CHEST 2 VIEWS: CPT | Performed by: RADIOLOGY

## 2021-09-14 PROCEDURE — 99207 PR FOOT EXAM NO CHARGE: CPT | Mod: 25 | Performed by: INTERNAL MEDICINE

## 2021-09-14 PROCEDURE — 83615 LACTATE (LD) (LDH) ENZYME: CPT | Performed by: INTERNAL MEDICINE

## 2021-09-14 PROCEDURE — 99000 SPECIMEN HANDLING OFFICE-LAB: CPT | Performed by: INTERNAL MEDICINE

## 2021-09-14 PROCEDURE — 82164 ANGIOTENSIN I ENZYME TEST: CPT | Mod: 90 | Performed by: INTERNAL MEDICINE

## 2021-09-14 RX ORDER — PANTOPRAZOLE SODIUM 40 MG/1
40 TABLET, DELAYED RELEASE ORAL DAILY
Qty: 30 TABLET | Refills: 11 | Status: SHIPPED | OUTPATIENT
Start: 2021-09-14 | End: 2021-10-13 | Stop reason: SINTOL

## 2021-09-14 ASSESSMENT — ACTIVITIES OF DAILY LIVING (ADL): CURRENT_FUNCTION: NO ASSISTANCE NEEDED

## 2021-09-14 ASSESSMENT — MIFFLIN-ST. JEOR: SCORE: 1693.76

## 2021-09-14 NOTE — PROGRESS NOTES
"SUBJECTIVE:   Pascual Perea is a 81 year old male who presents for Preventive Visit and follow-up CDK, hypercalcemia, DM, etc      Patient has been advised of split billing requirements and indicates understanding: Yes   Are you in the first 12 months of your Medicare coverage?  No    Healthy Habits:     In general, how would you rate your overall health?  Fair    Frequency of exercise:  4-5 days/week    Duration of exercise:  15-30 minutes    Do you usually eat at least 4 servings of fruit and vegetables a day, include whole grains    & fiber and avoid regularly eating high fat or \"junk\" foods?  Yes    Taking medications regularly:  Yes    Barriers to taking medications:  None    Medication side effects:  None    Ability to successfully perform activities of daily living:  No assistance needed    Home Safety:  No safety concerns identified    Hearing Impairment:  No hearing concerns    In the past 6 months, have you been bothered by leaking of urine?  No    In general, how would you rate your overall mental or emotional health?  Good      PHQ-2 Total Score: 0    Additional concerns today:  Yes (Legs and feet)    Do you feel safe in your environment? Yes    Have you ever done Advance Care Planning? (For example, a Health Directive, POLST, or a discussion with a medical provider or your loved ones about your wishes): Yes, patient states has an Advance Care Planning document and will bring a copy to the clinic.       Fall risk  Fallen 2 or more times in the past year?: No  Any fall with injury in the past year?: No    Cognitive Screening   1) Repeat 3 items (Leader, Season, Table)    2) Clock draw: NORMAL  3) 3 item recall: Recalls 2 objects   Results: NORMAL clock, 2 items recalled: COGNITIVE IMPAIRMENT LESS LIKELY    Mini-CogTM Copyright ITZ Gonsalez. Licensed by the author for use in [facility] [employer]; reprinted with permission (dougie@.[facility]). All rights reserved.      Do you have sleep apnea, excessive " snoring or daytime drowsiness?: no    Reviewed and updated as needed this visit by clinical staff   Allergies  Meds              Reviewed and updated as needed this visit by Provider                Social History     Tobacco Use     Smoking status: Former Smoker     Packs/day: 1.00     Years: 6.00     Pack years: 6.00     Types: Cigarettes     Start date:      Quit date: 1964     Years since quittin.2     Smokeless tobacco: Never Used   Substance Use Topics     Alcohol use: Yes     Comment: couple drinks a year     If you drink alcohol do you typically have >3 drinks per day or >7 drinks per week? No    Alcohol Use 10/11/2019   Prescreen: >3 drinks/day or >7 drinks/week? No   Prescreen: >3 drinks/day or >7 drinks/week? -         Current providers sharing in care for this patient include:   Patient Care Team:  Andrew Elizalde MD as PCP - General  Andrew Elizalde MD as Assigned PCP  Rebekah Marrero PA-C as Assigned Heart and Vascular Provider  Krystal Jones RD as Diabetes Educator (Dietitian, Registered)    The following health maintenance items are reviewed in Epic and correct as of today:  Health Maintenance Due   Topic Date Due     ANNUAL REVIEW OF HM ORDERS  Never done     ADVANCE CARE PLANNING  2016     MEDICARE ANNUAL WELLNESS VISIT  10/11/2020     DIABETIC FOOT EXAM  10/11/2020     MICROALBUMIN  2020     INFLUENZA VACCINE (1) 2021     TSH W/FREE T4 REFLEX  2021     Labs reviewed in EPIC    Component      Latest Ref Rng & Units 2021 6/10/2021 2021 8/10/2021   Sodium      133 - 144 mmol/L 137 140 140 139   Potassium      3.4 - 5.3 mmol/L 4.6 4.7 4.1 4.2   Chloride      94 - 109 mmol/L 105 106 106 105   Carbon Dioxide      20 - 32 mmol/L 29 31 29 30   Anion Gap      3 - 14 mmol/L 3 3 5 4   Glucose      70 - 99 mg/dL 240 (H) 226 (H) 208 (H) 112 (H)   Urea Nitrogen      7 - 30 mg/dL 18 18 20 30   Creatinine      0.66 - 1.25 mg/dL 1.27 (H) 1.41 (H) 1.69 (H)  1.80 (H)   GFR Estimate      >60 mL/min/1.73m2 53 (L) 47 (L) 38 (L) 35 (L)   GFR Estimate If Black      >60 mL/min/1.73:m2 62 54 (L) 44 (L)    Calcium      8.5 - 10.1 mg/dL 10.0 10.1 10.6 (H) 10.8 (H)   Bilirubin Total      0.2 - 1.3 mg/dL 1.0 0.6     Albumin      3.4 - 5.0 g/dL 3.8 3.9     Protein Total      6.8 - 8.8 g/dL 6.7 (L) 6.7 (L)     Alkaline Phosphatase      40 - 150 U/L 119 90     ALT      0 - 70 U/L 54 37     AST      0 - 45 U/L 34 27     Cholesterol      <200 mg/dL  141     Triglycerides      <150 mg/dL  221 (H)     HDL Cholesterol      >39 mg/dL  40     LDL Cholesterol Calculated      <100 mg/dL  57     Non HDL Cholesterol      <130 mg/dL  101     Hemoglobin A1C      0 - 5.6 %  8.5 (H)     Parathyroid Hormone Intact      18 - 80 pg/mL  80  62   Sed Rate      0 - 20 mm/h   7         Blood sugars:  74,146,97,99  120,278,323,340 (eating unusual  Foods at [employer] in [place] that day  116,174,120,167  115,187,142,176  79    Occ low sugar feeling in middle of night      Review of Systems  CONSTITUTIONAL: NEGATIVE for fever, chills. Weight up 8 pounds in 1 year but down 1 pounds in 6 mos  INTEGUMENTARY/SKIN: NEGATIVE for worrisome rashes, moles or lesions  EYES: NEGATIVE for  Irritation. Rare blurriness. Occ floaters in eyes bilaterally. Eye exam March 2021. Surgery treatment not recommended by eye doctor  ENT/MOUTH: NEGATIVE for ear pain , mouth and throat problems. Using hearing aids  RESP: NEGATIVE for significant cough. Slight SOB with few flights of stairs  CV: NEGATIVE for chest pain, palpitations or peripheral edema. On lasix every other day  GI: NEGATIVE for nausea, abdominal pain, Rare constipation. GERD sx despite Pepcid. Taking 4-5 TUMS/day. 4-5 cups coffee/day. 1 can  Coke   : NEGATIVE for frequency, dysuria, or hematuria. Nocturia x1 and right back to sleep  MUSCULOSKELETAL:  POSITIVE for some general arthralgias in fingers, shoulders. Occ left nee pain going down stairs. No swelling. No  "myalgias  NEURO: NEGATIVE for  dizziness or paresthesias. Occ sense of  weakness in BLE.  Ambulating without assistance of cane/walker. No falls  ENDOCRINE: NEGATIVE for temperature intolerance. DM as above  HEME: NEGATIVE for bleeding problems  PSYCHIATRIC: NEGATIVE for changes in mood or affect    OBJECTIVE:   /62   Pulse 62   Temp 97.1  F (36.2  C) (Temporal)   Resp 16   Ht 1.803 m (5' 11\")   Wt 96.2 kg (212 lb)   SpO2 100%   BMI 29.57 kg/m   Estimated body mass index is 29.43 kg/m  as calculated from the following:    Height as of 6/17/21: 1.803 m (5' 11\").    Weight as of 6/17/21: 95.7 kg (211 lb).  Physical Exam  General appearance -   alert, no distress  Skin - No rashes or lesions.  Head - normocephalic, atraumatic  Eyes - ANGELA, EOMI, fundi exam with nondilated pupils negative.  Ears - External ears normal. Canals clear. TM's normal.  Bilateral hearing aids  Nose/Sinuses - Nares normal. Septum midline. Mucosa normal. No drainage or sinus tenderness.  Oropharynx - No erythema, no adenopathy, no exudates.  Neck - Supple without adenopathy or thyromegaly. No bruits.  Lungs - Clear to auscultation without wheezes/rhonchi.  Heart - Regular rate and rhythm without murmurs, clicks, or gallops.  Nodes - No supraclavicular, axillary, or inguinal adenopathy palpable.  Abdomen - Abdomen soft, non-tender. BS normal. No masses or hepatosplenomegaly palpable. No bruits.  Extremities -No cyanosis, clubbing.Trace BLE edema.    Musculoskeletal - Spine ROM normal. Muscular strength intact inclusing BLE. Able to stand from chair without using hands to push off.   Peripheral pulses - radial=4/4, femoral=4/4, posterior tibial=4/4, dorsalis pedis=4/4,  Neuro - Gait normal. Reflexes normal and symmetric. Sensation grossly WNL.  Genital - Normal-appearing male external genitalia. No scrotal masses or inguinal hernia palpable.   Rectal - Guaic negative stool. Normal tone. Prostate 1 plus in size to palpation. No rectal " masses or prostate nodularity palpable          ASSESSMENT / PLAN:   1. Medicare annual wellness visit, subsequent  See plan discussion below.  Other healthcare maintenance up-to-date    2. Type 2 diabetes mellitus without complication, with long-term current use of insulin (H)  Blood sugars generally well controlled.  Occasional low blood sugar during the middle of the night.  Will reduce Lantus dosing.  Labs ordered  - FOOT EXAM  - Hemoglobin A1c; Future  - insulin glargine (LANTUS PEN) 100 UNIT/ML pen; Inject 66-68 Units Subcutaneous At Bedtime  Dispense: 75 mL; Refill: 3  - Hemoglobin A1c    3. Hypercalcemia  Persistent hypercalcemia.  Previous PTH upper normal.  Checks x-ray to rule out adenopathy.  Labs for possible sarcoid, metastatic prostate cancer, etc. we will also have patient stop taking calcium supplement with Tums and address GERD symptoms better with Protonix as below  - Angiotensin converting enzyme; Future  - Basic metabolic panel; Future  - Prostate Specific Antigen Screen; Future  - Parathyroid Hormone Intact; Future  - ESR: Erythrocyte sedimentation rate; Future  - Lactate Dehydrogenase; Future  - XR Chest 2 Views; Future  - Angiotensin converting enzyme  - Basic metabolic panel  - Prostate Specific Antigen Screen  - Parathyroid Hormone Intact  - ESR: Erythrocyte sedimentation rate  - Lactate Dehydrogenase    4. Stage 3b chronic kidney disease  Recent worsening.  Possibly related dehydration.  Will stop furosemide and recheck in 2 weeks  - Basic metabolic panel; Future    5. SOB (shortness of breath)  Mild shortness of breath which is chronic.  Chest x-ray to rule out any effusion.  Known history of some pulmonary hypertension.  O2 sats okay and no recent anemia  - XR Chest 2 Views; Future    6. Gastroesophageal reflux disease without esophagitis  Uncontrolled.  Patient to stop taking Tums and H2 blocker and will try treating with pantoprazole if tolerated.  Had some previous loose stools with  "omeprazole  - pantoprazole (PROTONIX) 40 MG EC tablet; Take 1 tablet (40 mg) by mouth daily  Dispense: 30 tablet; Refill: 11    7. Screening for prostate cancer  Given hypercalcemia, will screen for prostate cancer despite age.  MARCELLA okay  - Prostate Specific Antigen Screen; Future  - Prostate Specific Antigen Screen    8. Hypothyroidism, unspecified type  On levothyroxine.  Due for lab recheck  - TSH with free T4 reflex; Future      Patient has been advised of split billing requirements and indicates understanding: Yes     COUNSELING:  Reviewed preventive health counseling, as reflected in patient instructions    Estimated body mass index is 29.57 kg/m  as calculated from the following:    Height as of this encounter: 1.803 m (5' 11\").    Weight as of this encounter: 96.2 kg (212 lb).        He reports that he quit smoking about 57 years ago. His smoking use included cigarettes. He started smoking about 63 years ago. He has a 6.00 pack-year smoking history. He has never used smokeless tobacco.      Appropriate preventive services were discussed with this patient, including applicable screening as appropriate for cardiovascular disease, diabetes, osteopenia/osteoporosis, and glaucoma.  As appropriate for age/gender, discussed screening for colorectal cancer, prostate cancer, breast cancer, and cervical cancer. Checklist reviewing preventive services available has been given to the patient.    Reviewed patients plan of care and provided an AVS. The Basic Care Plan (routine screening as documented in Health Maintenance) for Pascual meets the Care Plan requirement. This Care Plan has been established and reviewed with the Patient.         PLAN:   Reduce Lantus to  to 68 units at bedtime. If occ low sugar feeling still in middle of night, then reduce further to 66 units daily   Stop Furosemide for now for possible dehydration affecting muscles/calcium   [facility]igh [facility] daily [facility]  next Friday 9/24/21 with " weight from today at home and that day and whether ankles swelling again and how your acid reflux is doing  Reduce coffee in take to max of 1-2 cups/day to lessen acid reflux   Stop TUMS/calcium tabs and also stop Famotidine   Start Pantoprazole 40mg tab. 1 tab  Daily about 30 min  before breakfast for acid reflux. Let me know if loose stool side effects with the medication  Flu vaccine in October   Labs today as ordered   Chest Xray  Continue other medications  Repeat nonfasting labs again in 2 weeks as ordered  Pt was informed regarding extra E&M billing for management of new or established medical issues not related to today's wellness visit    Andrew Elizalde MD  [facility]

## 2021-09-14 NOTE — PATIENT INSTRUCTIONS
Reduce Lantus to  to 68 units at bedtime. If occ low sugar feeling still in middle of night, then reduce further to 66 units daily   Stop Furosemide for now for possible dehydration affecting muscles/calcium   Weigh self daily and update me in [facility]  next Friday 9/24/21 with weight from today at home and that day and whether ankles swelling again and how your acid reflux is doing  Reduce coffee in take to max of 1-2 cups/day to lessen acid reflux   Stop TUMS/calcium tabs and also stop Famotidine   Start Pantoprazole 40mg tab. 1 tab  Daily about 30 min  before breakfast for acid reflux. Let me know if loose stool side effects with the medication  Flu vaccine in October   Labs today as ordered   Chest Xray  Continue other medications  Repeat nonfasting labs again in 2 weeks as ordered  Pt was informed regarding extra E&M billing for management of new or established medical issues not related to today's wellness visit

## 2021-09-15 LAB — ACE SERPL-CCNC: <5 U/L

## 2021-09-15 RX ORDER — LISINOPRIL 30 MG/1
TABLET ORAL
Qty: 30 TABLET | Refills: 0 | OUTPATIENT
Start: 2021-09-15

## 2021-09-15 NOTE — TELEPHONE ENCOUNTER
Routing for pcp review  Lisinopril discontinued pending labs. Labs drawn yesterday.  Mercedez Casas, RN  [facility] RN Triage Team

## 2021-09-18 ENCOUNTER — HEALTH MAINTENANCE LETTER (OUTPATIENT)
Age: 81
End: 2021-09-18

## 2021-09-20 ENCOUNTER — MYC MEDICAL ADVICE (OUTPATIENT)
Dept: INTERNAL MEDICINE | Facility: CLINIC | Age: 81
End: 2021-09-20

## 2021-09-29 ENCOUNTER — MYC MEDICAL ADVICE (OUTPATIENT)
Dept: INTERNAL MEDICINE | Facility: CLINIC | Age: 81
End: 2021-09-29

## 2021-09-29 DIAGNOSIS — E11.9 TYPE 2 DIABETES MELLITUS WITHOUT COMPLICATION, WITH LONG-TERM CURRENT USE OF INSULIN (H): Primary | ICD-10-CM

## 2021-09-29 DIAGNOSIS — Z79.4 TYPE 2 DIABETES MELLITUS WITHOUT COMPLICATION, WITH LONG-TERM CURRENT USE OF INSULIN (H): Primary | ICD-10-CM

## 2021-09-29 NOTE — TELEPHONE ENCOUNTER
Please see mychart from patient and advise appropriate course of action.      Would you like patient to try different medication?    Eric Price RN  [facility] Triage Nurse

## 2021-10-11 DIAGNOSIS — E03.9 HYPOTHYROIDISM, UNSPECIFIED TYPE: ICD-10-CM

## 2021-10-12 NOTE — TELEPHONE ENCOUNTER
Routing refill request to provider for review/approval because:  Labs not current:    TSH   Date Value Ref Range Status   09/25/2020 3.01 0.40 - 4.00 mU/L Final       Stephanie Bhandari RN

## 2021-10-13 RX ORDER — LEVOTHYROXINE SODIUM 100 UG/1
TABLET ORAL
Qty: 90 TABLET | Refills: 3 | Status: SHIPPED | OUTPATIENT
Start: 2021-10-13 | End: 2022-11-07 | Stop reason: DRUGHIGH

## 2021-10-13 NOTE — TELEPHONE ENCOUNTER
Patient due for nonfasting TSH.  Spoke with patient and will make lab appointment.  Medication refilled

## 2021-10-14 NOTE — TELEPHONE ENCOUNTER
Spoke with patient.  He states loose stools improved quite a lot and having moderately formed bowel movement once a day but still little bit soft.  Previous GERD symptoms have resolved.  Patient to try stopping pantoprazole to be sure stools normalized again and to see if prior GERD/abdominal symptoms hopefully stay away.  The patient has recurrent GERD symptoms off of PPI, he will let me know.  Lab results reviewed from 9/14/2021.  Patient will repeat A1c again in 3 months.  He is now off of lisinopril and furosemide and will be having a nonfasting BMP rechecked along with TSH in the next week.  Prior hypercalcemia resolved but will be also rechecked with this BMP lab.  Med and allergy/intolerance list updated to reflect loose stools with pantoprazole

## 2021-10-14 NOTE — TELEPHONE ENCOUNTER
Spoke with patient.  Was taken off lisinopril due to renal insufficiency.  Patient off of furosemide now also and weight stable without diuretic per patient's MyChart message with this encounter.  Patient to remain off of lisinopril and furosemide and have a nonfasting lab test in the next week to recheck BMP and TSH as previously ordered

## 2021-10-20 ENCOUNTER — LAB (OUTPATIENT)
Dept: LAB | Facility: CLINIC | Age: 81
End: 2021-10-20
Payer: COMMERCIAL

## 2021-10-20 DIAGNOSIS — Z79.4 TYPE 2 DIABETES MELLITUS WITHOUT COMPLICATION, WITH LONG-TERM CURRENT USE OF INSULIN (H): ICD-10-CM

## 2021-10-20 DIAGNOSIS — E11.9 TYPE 2 DIABETES MELLITUS WITHOUT COMPLICATION, WITH LONG-TERM CURRENT USE OF INSULIN (H): ICD-10-CM

## 2021-10-20 LAB — HBA1C MFR BLD: 6.9 % (ref 0–5.6)

## 2021-10-20 PROCEDURE — 36415 COLL VENOUS BLD VENIPUNCTURE: CPT

## 2021-10-20 PROCEDURE — 83036 HEMOGLOBIN GLYCOSYLATED A1C: CPT

## 2021-10-27 ENCOUNTER — ANCILLARY PROCEDURE (OUTPATIENT)
Dept: GENERAL RADIOLOGY | Facility: CLINIC | Age: 81
End: 2021-10-27
Attending: FAMILY MEDICINE
Payer: COMMERCIAL

## 2021-10-27 ENCOUNTER — OFFICE VISIT (OUTPATIENT)
Dept: URGENT CARE | Facility: URGENT CARE | Age: 81
End: 2021-10-27
Payer: COMMERCIAL

## 2021-10-27 VITALS
TEMPERATURE: 97 F | SYSTOLIC BLOOD PRESSURE: 138 MMHG | RESPIRATION RATE: 23 BRPM | OXYGEN SATURATION: 96 % | HEART RATE: 74 BPM | DIASTOLIC BLOOD PRESSURE: 73 MMHG

## 2021-10-27 DIAGNOSIS — M54.2 NECK PAIN: Primary | ICD-10-CM

## 2021-10-27 PROCEDURE — 99214 OFFICE O/P EST MOD 30 MIN: CPT | Performed by: FAMILY MEDICINE

## 2021-10-27 PROCEDURE — 72040 X-RAY EXAM NECK SPINE 2-3 VW: CPT | Performed by: RADIOLOGY

## 2021-10-27 RX ORDER — METHOCARBAMOL 750 MG/1
750 TABLET, FILM COATED ORAL 4 TIMES DAILY
Qty: 28 TABLET | Refills: 0 | Status: SHIPPED | OUTPATIENT
Start: 2021-10-27 | End: 2021-11-03

## 2021-10-27 NOTE — PROGRESS NOTES
SUBJECTIVE:  Chief Complaint   Patient presents with     Urgent Care     stiffy neck since Sunday.   .ident who presents with a chief complaint of  bilateral neck pain.  Symptoms began 3-4 day(s) ago , are moderate andstill present.  Context:Injury: no  Pain exacerbated by movement     Past Medical History:   Diagnosis Date     Adhesive capsulitis of shoulder      Anemia 3/10/2014     Anemia, unspecified      Antiplatelet or antithrombotic long-term use      Arrhythmia     Atrial fib/flutter     CAD (coronary artery disease)      History of cardioversion 04/24/2018    a single synchronized shock of 120 joules restored normal sinus rhythm     History of cardioversion 02/13/2019    single shock , 200 joules successul in restoring NSR     Hyperlipidemia LDL goal <70 5/26/2011     Hypertension      Hypothyroidism      Impotence of organic origin      Laceration of finger  June 2010     left thumb s/p sutures     Numbness and tingling     diabetic neuropathy bilateral feet     BRANDON (obstructive sleep apnea)     intolerant of CPAP, uses it occasionally.  Using mandibular device occasionally     Osteopenia      Pulmonary hypertension (H) 4/6/2012     Sensorineural hearing loss, unspecified      Stented coronary artery 1997    CABG      Testosterone deficiency      Type 2 diabetes mellitus without complication  (goal A1C<8) 10/24/2015     Typical atrial flutter (H) 4/18/16     Unspecified hypothyroidism      Vitamin D deficiency 9/3/2011       Past Surgical History:   Procedure Laterality Date     ANESTHESIA CARDIOVERSION N/A 2/13/2019    Procedure: ANESTHESIA CARDIOVERSION;  Surgeon: GENERIC ANESTHESIA PROVIDER;  Location:  OR     ANESTHESIA CARDIOVERSION N/A 5/22/2019    Procedure: ANESTHESIA, FOR CARDIOVERSION;  Surgeon: GENERIC ANESTHESIA PROVIDER;  Location:  OR     CARDIAC SURGERY      bypass in 1997     CARDIOVERSION  04/24/2018     COLONOSCOPY       CORONARY ANGIOGRAPHY ADULT ORDER      4/2/2012     CORONARY  ARTERY BYPASS      [facility] to [facility]     ENDOSCOPIC ULTRASOUND UPPER GASTROINTESTINAL TRACT (GI) N/A 3/14/2019    Procedure: ENDOSCOPIC ULTRASOUND OF UPPER GASTROINTESTINAL TRACT;  Surgeon: Ju Torres MD;  Location:  OR     EXCISE MASS HEAD Left 2016    Procedure: EXCISE MASS HEAD;  Surgeon: Ivan Hernandez MD;  Location: [facility]     HEART CATH, ANGIOPLASTY  2012     LAPAROSCOPIC CHOLECYSTECTOMY N/A 3/17/2019    Procedure: LAPAROSCOPIC CHOLECYSTECTOMY;  Surgeon: Janel Paz MD;  Location:  OR     PHACOEMULSIFICATION CLEAR CORNEA WITH STANDARD INTRAOCULAR LENS IMPLANT Left 2015    Procedure: PHACOEMULSIFICATION CLEAR CORNEA WITH STANDARD INTRAOCULAR LENS IMPLANT;  Surgeon: Nixon Farnsworth MD;  Location:  EC     PHACOEMULSIFICATION CLEAR CORNEA WITH STANDARD INTRAOCULAR LENS IMPLANT Right 2015    Procedure: PHACOEMULSIFICATION CLEAR CORNEA WITH STANDARD INTRAOCULAR LENS IMPLANT;  Surgeon: Nixon Farnsworth MD;  Location:  [facility]     SEPTOPLASTY, TURBINOPLASTY, COMBINED Bilateral 2016    Procedure: COMBINED SEPTOPLASTY, TURBINOPLASTY;  Surgeon: Ivan Hernandez MD;  Location: [facility]     ZZC NONSPECIFIC PROCEDURE      CABG ([facility])     [facility] NONSPECIFIC PROCEDURE      stress echo       Family History   Problem Relation Age of Onset     Genitourinary Problems Father         d: age 89; ARF     Hypertension Father      Diabetes Mother         d: age 68, CAD     Heart Disease Mother         MI     Myocardial Infarction Mother      Cardiovascular Brother         d:  age 31; CAD     Heart Disease Brother         age 31, MI     Diabetes Sister         b:  1939; DM2       Social History     Tobacco Use     Smoking status: Former Smoker     Packs/day: 1.00     Years: 6.00     Pack years: 6.00     Types: Cigarettes     Start date:      Quit date: 1964     Years since quittin.3     Smokeless tobacco: Never Used   Substance Use  Topics     Alcohol use: Yes     Comment: couple drinks a year       ROS:Review of systems negative except as stated below    EXAM: /73   Pulse 74   Temp 97  F (36.1  C) (Oral)   Resp 23   SpO2 96%   Exam:neck  tenderness to palpation and pain with rom  GENERAL APPEARANCE: healthy, alert and no distress  EXTREMITIES: peripheral pulses normal  SKIN: no suspicious lesions or rashes  NEURO: Normal strength and tone, sensory exam grossly normal, mentation intact and speech normal  PERRLA, neuro non focal    Xray without acute findings, no compression fx, mass read by Aníbal Madison D.O.    ASSESSMENT:     ICD-10-CM    1. Neck pain  M54.2 XR Cervical Spine 2/3 Views     methocarbamol (ROBAXIN) 750 MG tablet     Heat  OTC tylenol  F/u if cont, ED if worse

## 2021-11-22 DIAGNOSIS — E78.5 HYPERLIPIDEMIA LDL GOAL <70: ICD-10-CM

## 2021-11-23 RX ORDER — ATORVASTATIN CALCIUM 40 MG/1
40 TABLET, FILM COATED ORAL DAILY
Qty: 90 TABLET | Refills: 3 | Status: SHIPPED | OUTPATIENT
Start: 2021-11-23 | End: 2022-11-28

## 2021-12-15 ENCOUNTER — LAB (OUTPATIENT)
Dept: LAB | Facility: CLINIC | Age: 81
End: 2021-12-15
Payer: COMMERCIAL

## 2021-12-15 DIAGNOSIS — E03.9 HYPOTHYROIDISM, UNSPECIFIED TYPE: ICD-10-CM

## 2021-12-15 DIAGNOSIS — N18.32 STAGE 3B CHRONIC KIDNEY DISEASE (H): ICD-10-CM

## 2021-12-15 LAB
ANION GAP SERPL CALCULATED.3IONS-SCNC: 8 MMOL/L (ref 3–14)
BUN SERPL-MCNC: 21 MG/DL (ref 7–30)
CALCIUM SERPL-MCNC: 10.5 MG/DL (ref 8.5–10.1)
CHLORIDE BLD-SCNC: 107 MMOL/L (ref 94–109)
CO2 SERPL-SCNC: 24 MMOL/L (ref 20–32)
CREAT SERPL-MCNC: 1.49 MG/DL (ref 0.66–1.25)
GFR SERPL CREATININE-BSD FRML MDRD: 44 ML/MIN/1.73M2
GLUCOSE BLD-MCNC: 182 MG/DL (ref 70–99)
POTASSIUM BLD-SCNC: 4.2 MMOL/L (ref 3.4–5.3)
SODIUM SERPL-SCNC: 139 MMOL/L (ref 133–144)
TSH SERPL DL<=0.005 MIU/L-ACNC: 3.27 MU/L (ref 0.4–4)

## 2021-12-15 PROCEDURE — 36415 COLL VENOUS BLD VENIPUNCTURE: CPT

## 2021-12-15 PROCEDURE — 80048 BASIC METABOLIC PNL TOTAL CA: CPT

## 2021-12-15 PROCEDURE — 84443 ASSAY THYROID STIM HORMONE: CPT

## 2021-12-25 ENCOUNTER — HOSPITAL ENCOUNTER (INPATIENT)
Facility: CLINIC | Age: 81
LOS: 3 days | Discharge: HOME | DRG: 291 | End: 2021-12-28
Attending: NURSE PRACTITIONER | Admitting: STUDENT IN AN ORGANIZED HEALTH CARE EDUCATION/TRAINING PROGRAM
Payer: COMMERCIAL

## 2021-12-25 ENCOUNTER — APPOINTMENT (OUTPATIENT)
Dept: GENERAL RADIOLOGY | Facility: CLINIC | Age: 81
DRG: 291 | End: 2021-12-25
Attending: NURSE PRACTITIONER
Payer: COMMERCIAL

## 2021-12-25 ENCOUNTER — APPOINTMENT (OUTPATIENT)
Dept: CT IMAGING | Facility: CLINIC | Age: 81
DRG: 291 | End: 2021-12-25
Attending: NURSE PRACTITIONER
Payer: COMMERCIAL

## 2021-12-25 DIAGNOSIS — I50.30 HEART FAILURE WITH PRESERVED EJECTION FRACTION, NYHA CLASS II (H): Primary | ICD-10-CM

## 2021-12-25 DIAGNOSIS — I48.3 TYPICAL ATRIAL FLUTTER (H): ICD-10-CM

## 2021-12-25 DIAGNOSIS — I48.91 ATRIAL FIBRILLATION, UNSPECIFIED TYPE (H): ICD-10-CM

## 2021-12-25 DIAGNOSIS — I50.9 HEART FAILURE (H): ICD-10-CM

## 2021-12-25 DIAGNOSIS — I25.119 ATHEROSCLEROSIS OF NATIVE CORONARY ARTERY OF NATIVE HEART WITH ANGINA PECTORIS (H): ICD-10-CM

## 2021-12-25 DIAGNOSIS — R06.00 DYSPNEA: ICD-10-CM

## 2021-12-25 LAB
ALBUMIN SERPL-MCNC: 3.8 G/DL (ref 3.4–5)
ALBUMIN UR-MCNC: NEGATIVE MG/DL
ALP SERPL-CCNC: 93 U/L (ref 40–150)
ALT SERPL W P-5'-P-CCNC: 77 U/L (ref 0–70)
ANION GAP SERPL CALCULATED.3IONS-SCNC: 4 MMOL/L (ref 3–14)
APPEARANCE UR: CLEAR
AST SERPL W P-5'-P-CCNC: 43 U/L (ref 0–45)
ATRIAL RATE - MUSE: 197 BPM
BASOPHILS # BLD AUTO: 0.1 10E3/UL (ref 0–0.2)
BASOPHILS NFR BLD AUTO: 1 %
BILIRUB SERPL-MCNC: 0.7 MG/DL (ref 0.2–1.3)
BILIRUB UR QL STRIP: NEGATIVE
BUN SERPL-MCNC: 23 MG/DL (ref 7–30)
CALCIUM SERPL-MCNC: 10.3 MG/DL (ref 8.5–10.1)
CHLORIDE BLD-SCNC: 107 MMOL/L (ref 94–109)
CO2 SERPL-SCNC: 28 MMOL/L (ref 20–32)
COLOR UR AUTO: ABNORMAL
CREAT SERPL-MCNC: 1.43 MG/DL (ref 0.66–1.25)
DIASTOLIC BLOOD PRESSURE - MUSE: NORMAL MMHG
EOSINOPHIL # BLD AUTO: 0.2 10E3/UL (ref 0–0.7)
EOSINOPHIL NFR BLD AUTO: 3 %
ERYTHROCYTE [DISTWIDTH] IN BLOOD BY AUTOMATED COUNT: 14.6 % (ref 10–15)
GFR SERPL CREATININE-BSD FRML MDRD: 50 ML/MIN/1.73M2
GLUCOSE BLD-MCNC: 164 MG/DL (ref 70–99)
GLUCOSE BLDC GLUCOMTR-MCNC: 169 MG/DL (ref 70–99)
GLUCOSE BLDC GLUCOMTR-MCNC: 72 MG/DL (ref 70–99)
GLUCOSE UR STRIP-MCNC: 300 MG/DL
HCT VFR BLD AUTO: 35.8 % (ref 40–53)
HGB BLD-MCNC: 11 G/DL (ref 13.3–17.7)
HGB UR QL STRIP: NEGATIVE
HOLD SPECIMEN: NORMAL
IMM GRANULOCYTES # BLD: 0 10E3/UL
IMM GRANULOCYTES NFR BLD: 0 %
INTERPRETATION ECG - MUSE: NORMAL
KETONES UR STRIP-MCNC: NEGATIVE MG/DL
LEUKOCYTE ESTERASE UR QL STRIP: NEGATIVE
LYMPHOCYTES # BLD AUTO: 1 10E3/UL (ref 0.8–5.3)
LYMPHOCYTES NFR BLD AUTO: 18 %
MCH RBC QN AUTO: 27 PG (ref 26.5–33)
MCHC RBC AUTO-ENTMCNC: 30.7 G/DL (ref 31.5–36.5)
MCV RBC AUTO: 88 FL (ref 78–100)
MONOCYTES # BLD AUTO: 0.5 10E3/UL (ref 0–1.3)
MONOCYTES NFR BLD AUTO: 9 %
NEUTROPHILS # BLD AUTO: 3.8 10E3/UL (ref 1.6–8.3)
NEUTROPHILS NFR BLD AUTO: 69 %
NITRATE UR QL: NEGATIVE
NRBC # BLD AUTO: 0 10E3/UL
NRBC BLD AUTO-RTO: 0 /100
NT-PROBNP SERPL-MCNC: 7439 PG/ML (ref 0–1800)
P AXIS - MUSE: NORMAL DEGREES
PH UR STRIP: 6 [PH] (ref 5–7)
PLATELET # BLD AUTO: 224 10E3/UL (ref 150–450)
POTASSIUM BLD-SCNC: 4 MMOL/L (ref 3.4–5.3)
POTASSIUM BLD-SCNC: 4.1 MMOL/L (ref 3.4–5.3)
PR INTERVAL - MUSE: NORMAL MS
PROT SERPL-MCNC: 7.1 G/DL (ref 6.8–8.8)
QRS DURATION - MUSE: 120 MS
QT - MUSE: 490 MS
QTC - MUSE: 472 MS
R AXIS - MUSE: 63 DEGREES
RBC # BLD AUTO: 4.08 10E6/UL (ref 4.4–5.9)
RBC URINE: <1 /HPF
SARS-COV-2 RNA RESP QL NAA+PROBE: NEGATIVE
SODIUM SERPL-SCNC: 139 MMOL/L (ref 133–144)
SP GR UR STRIP: 1.02 (ref 1–1.03)
SYSTOLIC BLOOD PRESSURE - MUSE: NORMAL MMHG
T AXIS - MUSE: 238 DEGREES
TROPONIN I SERPL HS-MCNC: 71 NG/L
UROBILINOGEN UR STRIP-MCNC: 2 MG/DL
VENTRICULAR RATE- MUSE: 56 BPM
WBC # BLD AUTO: 5.5 10E3/UL (ref 4–11)
WBC URINE: 1 /HPF

## 2021-12-25 PROCEDURE — 71046 X-RAY EXAM CHEST 2 VIEWS: CPT

## 2021-12-25 PROCEDURE — 99223 1ST HOSP IP/OBS HIGH 75: CPT | Mod: AI | Performed by: STUDENT IN AN ORGANIZED HEALTH CARE EDUCATION/TRAINING PROGRAM

## 2021-12-25 PROCEDURE — U0003 INFECTIOUS AGENT DETECTION BY NUCLEIC ACID (DNA OR RNA); SEVERE ACUTE RESPIRATORY SYNDROME CORONAVIRUS 2 (SARS-COV-2) (CORONAVIRUS DISEASE [COVID-19]), AMPLIFIED PROBE TECHNIQUE, MAKING USE OF HIGH THROUGHPUT TECHNOLOGIES AS DESCRIBED BY CMS-2020-01-R: HCPCS | Performed by: NURSE PRACTITIONER

## 2021-12-25 PROCEDURE — 81001 URINALYSIS AUTO W/SCOPE: CPT | Performed by: NURSE PRACTITIONER

## 2021-12-25 PROCEDURE — 210N000002 HC R&B HEART CARE

## 2021-12-25 PROCEDURE — 85014 HEMATOCRIT: CPT | Performed by: NURSE PRACTITIONER

## 2021-12-25 PROCEDURE — C9803 HOPD COVID-19 SPEC COLLECT: HCPCS

## 2021-12-25 PROCEDURE — 99285 EMERGENCY DEPT VISIT HI MDM: CPT | Mod: 25

## 2021-12-25 PROCEDURE — G0378 HOSPITAL OBSERVATION PER HR: HCPCS

## 2021-12-25 PROCEDURE — 250N000011 HC RX IP 250 OP 636: Performed by: NURSE PRACTITIONER

## 2021-12-25 PROCEDURE — 250N000013 HC RX MED GY IP 250 OP 250 PS 637: Performed by: STUDENT IN AN ORGANIZED HEALTH CARE EDUCATION/TRAINING PROGRAM

## 2021-12-25 PROCEDURE — 36415 COLL VENOUS BLD VENIPUNCTURE: CPT | Performed by: STUDENT IN AN ORGANIZED HEALTH CARE EDUCATION/TRAINING PROGRAM

## 2021-12-25 PROCEDURE — 96374 THER/PROPH/DIAG INJ IV PUSH: CPT | Mod: 59

## 2021-12-25 PROCEDURE — 83880 ASSAY OF NATRIURETIC PEPTIDE: CPT | Performed by: NURSE PRACTITIONER

## 2021-12-25 PROCEDURE — 250N000011 HC RX IP 250 OP 636: Performed by: STUDENT IN AN ORGANIZED HEALTH CARE EDUCATION/TRAINING PROGRAM

## 2021-12-25 PROCEDURE — 80053 COMPREHEN METABOLIC PANEL: CPT | Performed by: NURSE PRACTITIONER

## 2021-12-25 PROCEDURE — 93005 ELECTROCARDIOGRAM TRACING: CPT

## 2021-12-25 PROCEDURE — 84484 ASSAY OF TROPONIN QUANT: CPT | Performed by: NURSE PRACTITIONER

## 2021-12-25 PROCEDURE — 250N000009 HC RX 250: Performed by: NURSE PRACTITIONER

## 2021-12-25 PROCEDURE — 84132 ASSAY OF SERUM POTASSIUM: CPT | Performed by: STUDENT IN AN ORGANIZED HEALTH CARE EDUCATION/TRAINING PROGRAM

## 2021-12-25 PROCEDURE — 250N000012 HC RX MED GY IP 250 OP 636 PS 637: Performed by: STUDENT IN AN ORGANIZED HEALTH CARE EDUCATION/TRAINING PROGRAM

## 2021-12-25 PROCEDURE — 36415 COLL VENOUS BLD VENIPUNCTURE: CPT | Performed by: NURSE PRACTITIONER

## 2021-12-25 PROCEDURE — 74177 CT ABD & PELVIS W/CONTRAST: CPT

## 2021-12-25 RX ORDER — FENOFIBRATE 54 MG/1
54 TABLET ORAL
Status: DISCONTINUED | OUTPATIENT
Start: 2021-12-26 | End: 2021-12-28 | Stop reason: HOSPADM

## 2021-12-25 RX ORDER — ATORVASTATIN CALCIUM 40 MG/1
40 TABLET, FILM COATED ORAL DAILY
Status: DISCONTINUED | OUTPATIENT
Start: 2021-12-25 | End: 2021-12-28 | Stop reason: HOSPADM

## 2021-12-25 RX ORDER — IOPAMIDOL 755 MG/ML
106 INJECTION, SOLUTION INTRAVASCULAR ONCE
Status: COMPLETED | OUTPATIENT
Start: 2021-12-25 | End: 2021-12-25

## 2021-12-25 RX ORDER — DEXTROSE MONOHYDRATE 25 G/50ML
25-50 INJECTION, SOLUTION INTRAVENOUS
Status: DISCONTINUED | OUTPATIENT
Start: 2021-12-25 | End: 2021-12-28 | Stop reason: HOSPADM

## 2021-12-25 RX ORDER — FUROSEMIDE 10 MG/ML
40 INJECTION INTRAMUSCULAR; INTRAVENOUS ONCE
Status: COMPLETED | OUTPATIENT
Start: 2021-12-25 | End: 2021-12-25

## 2021-12-25 RX ORDER — ONDANSETRON 4 MG/1
4 TABLET, ORALLY DISINTEGRATING ORAL EVERY 6 HOURS PRN
Status: DISCONTINUED | OUTPATIENT
Start: 2021-12-25 | End: 2021-12-28 | Stop reason: HOSPADM

## 2021-12-25 RX ORDER — POLYETHYLENE GLYCOL 3350 17 G/17G
17 POWDER, FOR SOLUTION ORAL DAILY PRN
Status: DISCONTINUED | OUTPATIENT
Start: 2021-12-25 | End: 2021-12-28 | Stop reason: HOSPADM

## 2021-12-25 RX ORDER — PROCHLORPERAZINE MALEATE 5 MG
5 TABLET ORAL EVERY 6 HOURS PRN
Status: DISCONTINUED | OUTPATIENT
Start: 2021-12-25 | End: 2021-12-28 | Stop reason: HOSPADM

## 2021-12-25 RX ORDER — LIDOCAINE 40 MG/G
CREAM TOPICAL
Status: DISCONTINUED | OUTPATIENT
Start: 2021-12-25 | End: 2021-12-28 | Stop reason: HOSPADM

## 2021-12-25 RX ORDER — NICOTINE POLACRILEX 4 MG
15-30 LOZENGE BUCCAL
Status: DISCONTINUED | OUTPATIENT
Start: 2021-12-25 | End: 2021-12-28 | Stop reason: HOSPADM

## 2021-12-25 RX ORDER — ACETAMINOPHEN 325 MG/1
650 TABLET ORAL EVERY 6 HOURS PRN
Status: DISCONTINUED | OUTPATIENT
Start: 2021-12-25 | End: 2021-12-28 | Stop reason: HOSPADM

## 2021-12-25 RX ORDER — AMOXICILLIN 250 MG
1 CAPSULE ORAL 2 TIMES DAILY PRN
Status: DISCONTINUED | OUTPATIENT
Start: 2021-12-25 | End: 2021-12-28 | Stop reason: HOSPADM

## 2021-12-25 RX ORDER — ACETAMINOPHEN 650 MG/1
650 SUPPOSITORY RECTAL EVERY 6 HOURS PRN
Status: DISCONTINUED | OUTPATIENT
Start: 2021-12-25 | End: 2021-12-28 | Stop reason: HOSPADM

## 2021-12-25 RX ORDER — AZELASTINE 1 MG/ML
2 SPRAY, METERED NASAL 2 TIMES DAILY
Status: DISCONTINUED | OUTPATIENT
Start: 2021-12-25 | End: 2021-12-28 | Stop reason: HOSPADM

## 2021-12-25 RX ORDER — LEVOTHYROXINE SODIUM 100 UG/1
100 TABLET ORAL
Status: DISCONTINUED | OUTPATIENT
Start: 2021-12-26 | End: 2021-12-28 | Stop reason: HOSPADM

## 2021-12-25 RX ORDER — ONDANSETRON 2 MG/ML
4 INJECTION INTRAMUSCULAR; INTRAVENOUS EVERY 6 HOURS PRN
Status: DISCONTINUED | OUTPATIENT
Start: 2021-12-25 | End: 2021-12-28 | Stop reason: HOSPADM

## 2021-12-25 RX ORDER — PROCHLORPERAZINE 25 MG
12.5 SUPPOSITORY, RECTAL RECTAL EVERY 12 HOURS PRN
Status: DISCONTINUED | OUTPATIENT
Start: 2021-12-25 | End: 2021-12-28 | Stop reason: HOSPADM

## 2021-12-25 RX ORDER — METOPROLOL TARTRATE 25 MG/1
25 TABLET, FILM COATED ORAL 2 TIMES DAILY
Status: DISCONTINUED | OUTPATIENT
Start: 2021-12-25 | End: 2021-12-27

## 2021-12-25 RX ORDER — AMOXICILLIN 250 MG
2 CAPSULE ORAL 2 TIMES DAILY PRN
Status: DISCONTINUED | OUTPATIENT
Start: 2021-12-25 | End: 2021-12-28 | Stop reason: HOSPADM

## 2021-12-25 RX ORDER — FUROSEMIDE 10 MG/ML
40 INJECTION INTRAMUSCULAR; INTRAVENOUS
Status: DISCONTINUED | OUTPATIENT
Start: 2021-12-25 | End: 2021-12-26

## 2021-12-25 RX ADMIN — IOPAMIDOL 106 ML: 755 INJECTION, SOLUTION INTRAVENOUS at 12:49

## 2021-12-25 RX ADMIN — FUROSEMIDE 40 MG: 10 INJECTION, SOLUTION INTRAVENOUS at 13:07

## 2021-12-25 RX ADMIN — ATORVASTATIN CALCIUM 40 MG: 40 TABLET, FILM COATED ORAL at 18:10

## 2021-12-25 RX ADMIN — AZELASTINE HYDROCHLORIDE 2 SPRAY: 137 SPRAY, METERED NASAL at 21:21

## 2021-12-25 RX ADMIN — APIXABAN 5 MG: 5 TABLET, FILM COATED ORAL at 21:21

## 2021-12-25 RX ADMIN — INSULIN GLARGINE 68 UNITS: 100 INJECTION, SOLUTION SUBCUTANEOUS at 21:21

## 2021-12-25 RX ADMIN — SODIUM CHLORIDE 66 ML: 9 INJECTION, SOLUTION INTRAVENOUS at 12:49

## 2021-12-25 RX ADMIN — FUROSEMIDE 40 MG: 10 INJECTION, SOLUTION INTRAVENOUS at 18:10

## 2021-12-25 ASSESSMENT — ACTIVITIES OF DAILY LIVING (ADL)
ADLS_ACUITY_SCORE: 8
ADLS_ACUITY_SCORE: 10
DOING_ERRANDS_INDEPENDENTLY_DIFFICULTY: OTHER (SEE COMMENTS)
ADLS_ACUITY_SCORE: 8
ADLS_ACUITY_SCORE: 8
WALKING_OR_CLIMBING_STAIRS: OTHER (SEE COMMENTS)
ADLS_ACUITY_SCORE: 8
ADLS_ACUITY_SCORE: 10

## 2021-12-25 ASSESSMENT — ENCOUNTER SYMPTOMS
FEVER: 0
BLOOD IN STOOL: 0
ABDOMINAL PAIN: 1
SHORTNESS OF BREATH: 1
COUGH: 1

## 2021-12-25 ASSESSMENT — MIFFLIN-ST. JEOR: SCORE: 1712.35

## 2021-12-25 NOTE — ED PROVIDER NOTES
History   Chief Complaint:  Shortness of Breath and Abdominal Pain     The history is provided by the patient and a relative.      Pascual Perea is a 81 year old male with history of GERD, type II diabetes, and atrial fibrillation, on Eliquis, who presents for evaluation of shortness of breath and abdominal pain. The patient reports that for the last week he has been experiencing new lower abdominal pain with exertional shortness of breath. He states that 5 days ago he had a episode of mid chest pain that resolved on its own. He comes in today because over the last 2 days this exertional shortness of breath has become very severe and even walking around this house makes him become very short of breath. He notes he has had a mild cough as well. He denies any fever, blood in stool, dark stools, known COVID exposure, and diuretic medication use.     Review of Systems   Constitutional: Negative for fever.   Respiratory: Positive for cough and shortness of breath.    Gastrointestinal: Positive for abdominal pain. Negative for blood in stool.   All other systems reviewed and are negative.      Allergies:  Omeprazole  Pantoprazole    Medications:  Eliquis  Lipitor  Vitamin D3  Fenofibrate Micronized  Insulin Aspart & Glargine  Levothyroxine  Metoprolol Tartrate  Nitroglycerin    Past Medical History:    Adhesive capsulitis of shoulder   Anemia   Anemia, unspecified   Antiplatelet or antithrombotic long-term use   Arrhythmia   CAD    History of cardioversion   Hyperlipidemia LDL goal <70   Hypertension   Hypothyroidism   Impotence of organic origin   Laceration of finger   Numbness and tingling   BRANDON    Osteopenia   Pulmonary hypertension   Sensorineural hearing loss, unspecified   Stented coronary artery   Testosterone deficiency   Type 2 diabetes mellitus without complication    Typical atrial flutter   Unspecified hypothyroidism   Vitamin D deficiency  CKD Stage III     Past Surgical History:    Anesthesia  Cardioversion x2  CABG  Colonoscopy  Coronary Artery Bypass  EGD Combined   Excise Mass head  Phacoemulsification with intraocular lens implant x2  Septoplasty     Family History:     Problems  Diabetes x2  Hypertension  Myocardial infarction  Heart Disease    Social History:  The patient presents to the ED with family.    Physical Exam     Patient Vitals for the past 24 hrs:   BP Temp Temp src Pulse Resp SpO2   12/25/21 1355 -- -- -- 51 18 97 %   12/25/21 1350 -- -- -- 54 21 100 %   12/25/21 1235 126/79 -- -- 51 19 99 %   12/25/21 1220 -- -- -- (!) 43 22 100 %   12/25/21 1204 -- -- -- -- -- 99 %   12/25/21 1122 (!) 153/70 97.7  F (36.5  C) Oral 52 24 98 %       Physical Exam  Physical Exam   Constitutional: Pt appears well-developed and well-nourished. Non toxic appearing.   Head: Head moves freely with normal range of motion.   ENT: Oropharynx is clear and moist.   Eyes: Conjunctivae pink. EOMs intact. No scleral icterus.   Neck: Normal range of motion.    Cardiovascular: Regular rate and rhythm. Normal heart sounds. No concerning murmur.  Pulmonary/Chest: No respiratory distress. No decreased breath sounds. No wheezes. No rhonchi. Rhales in bilateral bases.  Abdominal: No rebound, no guarding. No CVA tenderness. No pain over McBurney's point. Negative Brumfield's sign. Diffuse upper and lower abdominal tenderness. Distended abdomen.   Musculoskeletal: Distal capillary refill and sensation intact. 1+ peripheral edema at ankles, otherwise no other peripheral edema.   Neurological: Oriented to person, place, and time. No focal deficits.   Skin: Skin is warm and pink in color. No rash noted.      Emergency Department Course     ECG:  Indication: Shortness of breath  Completed at 1149.  Read at 1153.   Atrial fibrillation with slow ventricular response. Inferior infarct age undetermined. Possible anterior infarct, age undetermined. ST & T wave abnormality, consider lateral ischemia. Abnormal Normal ECG. No significant  changes from prior ECG (2/18/20).   Rate 56 bpm. OH interval *. QRS duration 120. QT/QTc 490/472. P-R-T axes * 63 238.    Imaging:  XR Chest 2 Views:  Mild cardiac enlargement has increased slightly. Pulmonary vascularity remains within normal limits. Sternotomy and postoperative changes in the mediastinum are again noted. Lungs clear. No pleural effusions. No pneumothorax. Report per radiology     CT Abdomen Pelvis w Contrast:  1.  Diffuse fatty infiltration of the liver.   2.  Nodularity of the liver contour suggests underlying cirrhosis.   3.  Mild splenomegaly has increased since the previous exam, and may be related to portal hypertension.   4.  Mild ascites is new since the previous exam.   5.  Small right pleural effusion is also new since the previous exam.  Report per radiology    Laboratory:  CBC: WBC 5.5, HGB 11.0 (L),     CMP: Creat 1.43 (H), Ca 10.3 (H), Glucose 164 (H), ALT 77 (H), GFR 50 (L), o/w WNL     Troponin I (1145): 71    BNP: 7,439 (H)    UA: Glucose 300, o/w WNL     Asymptomatic COVID-19 PCR: Negative     Emergency Department Course:  Reviewed:  I reviewed nursing notes, vitals, past medical history and care everywhere    Assessments:  1136 I performed a physical exam of the patient. Findings as above.     1350 Patient rechecked and updated. Plan of care discussed and questions answered.     Consults:   8929  I spoke with Dr. Burgos of the Hospitalist service regarding patient's presentation, findings, and plan of care.    Interventions:  1307 Lasix 40 mg IV    Disposition:  The patient was admitted to the hospital under the care of Dr. Burgos.     Impression & Plan   Covid-19  Pascual Perea was evaluated during a global COVID-19 pandemic, which necessitated consideration that the patient might be at risk for infection with the SARS-CoV-2 virus that causes COVID-19.   Applicable protocols for evaluation were followed during the patient's care.   COVID-19 was considered as  part of the patient's evaluation. The plan for testing is: a test was obtained during this visit.     Medical Decision Making:  Pascual Perea is a 81 year old male who arrives with increased dyspnea over the past week and abdominal pain. Here he is obviously dyspneic at rest, but increased dyspnea with any movement. No obvious JVD on exam. Distended non-focal abdominal exam, peripheral edema and rales bilateral bases. Work-up consistent with heart Failure, last NM stress test with normal EF. He does have hx of single vessel CABG in 1991, normal Troponin here and EKG with atrial fibrillation, slow rate and T wave changes that are unchanged from previous EKG. BNP is 7439, last checked in 2020 and was 128. No chest pain at this time, he did feel some pain 4-5 days ago that was short lived. 40mg IV Lasix given and he urinated 2 liters while in the ED. He actually looks less dyspneic after this diuresis in the ED. Abd CT with mild ascites, no hx of cirrhosis or alcohol abuse, I question if this ascites is secondary to heart failure. Dr Burgos will admit to [facility]. Patient and his daughter are amenable to plan.     Diagnosis:    ICD-10-CM    1. Dyspnea  R06.00    2. Heart failure (H)  I50.9      Discharge Medications:  New Prescriptions    No medications on file     Scribe Disclosure:  I, Seng Graham, am serving as a scribe at 11:33 AM on 12/25/2021 to document services personally performed by Gladys Guzman, NP based on my observations and the provider's statements to me.     [place] Gladys Villatoro, JAKI CNP  12/25/21 9730

## 2021-12-25 NOTE — H&P
[facility]    History and Physical - Hospitalist Service       Date of Admission:  12/25/2021    Assessment & Plan      Pascual Perea is a 81 year old male admitted on 12/25/2021. He presented with progressive SOB. Likely CHF.    Likely acute HFpEF  Presents with progressive PEREZ, possible mild orthopnea. BNP, JVP elevated, peripheral edema. Was on lasix daily until follow up with PCP in September when it was stopped due to hypercalcemia.   EF >70% on MPI stress test and exercise stress test  - admit inpatient  - card consult  - TTE  - lasix BID  - continue metoprolol  - I&O  - daily weights    Permanent afib  - previously on amiodarone, stopped due to liver enzyme elevation  - follows with Joleen Marrero PA-C and Dr. Jarvis  - continue metoprolol  - continue apixaban    Possible cirrhosis  - noted cirrhotic appearing liver on CT  - consult GI  - hx of obesity raises c/f NAFLD, also was on amiodarone chronically    Hypercalcemia, mild  Has diagnosis of hyperparathyroidism in chart, but PTH and vit D are not elevated. Phos was low when last checked in 2019. Further w/u with PSA, SPEP, etc seem to be negative as well. Lasix was previously stopped due to   - add on phos  - monitor    CAD s/p single vessel CABG in 90s  HLD  - continue apixaban  - continue fenofibrate, atorvastatin    DMT2, well controlled, on insulin  - continue PTA glargine 68 and aspart 3u/15g CHO with MDSSI       Diet:   diabetic  DVT Prophylaxis: DOAC  Duran Catheter: Not present  Central Lines: None  Code Status:   DNR/DNI, discussed     Clinically Significant Risk Factors Present on Admission             # Coagulation Defect: home medication list includes an anticoagulant medication        Disposition Plan   Expected Discharge:  2-3 days  Anticipated discharge location:  Awaiting care coordination huddle  Delays:            The patient's care was discussed with the Bedside Nurse, Patient, Patient's Family and ED  Team.    Moiz Brugos MD  [facility]  Securely message with the [employer] Web Console (learn more here)  Text page via [facility] Paging/Directory        ______________________________________________________________________    Chief Complaint   SOB    History is obtained from the patient, electronic health record and emergency department physician    History of Present Illness   Pascual Perea is a 81 year old male who has hx of CAD s/p single vessel CABG in 1990s, permanent afib who presents with several weeks of progressive SOB. Endorses weight gain, increasing edema, waist circumference as well.  No reported history of CHF, but was taking daily lasix until it was stopped in September.     Brought to ED by his daughter when he came to [facility] and was visibly very dyspneic. In ED received lasix and peed 1.5L almost immediately.      Review of Systems    The 10 point Review of Systems is negative other than noted in the HPI or here.     Past Medical History    I have reviewed this patient's medical history and updated it with pertinent information if needed.   Past Medical History:   Diagnosis Date     Adhesive capsulitis of shoulder      Anemia 3/10/2014     Anemia, unspecified      Antiplatelet or antithrombotic long-term use      Arrhythmia     Atrial fib/flutter     CAD (coronary artery disease)      History of cardioversion 04/24/2018    a single synchronized shock of 120 joules restored normal sinus rhythm     History of cardioversion 02/13/2019    single shock , 200 joules successul in restoring NSR     Hyperlipidemia LDL goal <70 5/26/2011     Hypertension      Hypothyroidism      Impotence of organic origin      Laceration of finger  June 2010     left thumb s/p sutures     Numbness and tingling     diabetic neuropathy bilateral feet     BRANDON (obstructive sleep apnea)     intolerant of CPAP, uses it occasionally.  Using mandibular device occasionally     Osteopenia       Pulmonary hypertension (H) 4/6/2012     Sensorineural hearing loss, unspecified      Stented coronary artery 1997    CABG      Testosterone deficiency      Type 2 diabetes mellitus without complication  (goal A1C<8) 10/24/2015     Typical atrial flutter (H) 4/18/16     Unspecified hypothyroidism      Vitamin D deficiency 9/3/2011       Past Surgical History   I have reviewed this patient's surgical history and updated it with pertinent information if needed.  Past Surgical History:   Procedure Laterality Date     ANESTHESIA CARDIOVERSION N/A 2/13/2019    Procedure: ANESTHESIA CARDIOVERSION;  Surgeon: GENERIC ANESTHESIA PROVIDER;  Location:  OR     ANESTHESIA CARDIOVERSION N/A 5/22/2019    Procedure: ANESTHESIA, FOR CARDIOVERSION;  Surgeon: GENERIC ANESTHESIA PROVIDER;  Location:  OR     CARDIAC SURGERY      bypass in 1997     CARDIOVERSION  04/24/2018     COLONOSCOPY       CORONARY ANGIOGRAPHY ADULT ORDER      4/2/2012     CORONARY ARTERY BYPASS  1997    [facility]     ENDOSCOPIC ULTRASOUND UPPER GASTROINTESTINAL TRACT (GI) N/A 3/14/2019    Procedure: ENDOSCOPIC ULTRASOUND OF UPPER GASTROINTESTINAL TRACT;  Surgeon: Ju Torres MD;  Location:  OR     EXCISE MASS HEAD Left 8/18/2016    Procedure: EXCISE MASS HEAD;  Surgeon: Ivan Hernandez MD;  Location:  SD     HEART CATH, ANGIOPLASTY  4/2012     LAPAROSCOPIC CHOLECYSTECTOMY N/A 3/17/2019    Procedure: LAPAROSCOPIC CHOLECYSTECTOMY;  Surgeon: Janel Paz MD;  Location:  OR     PHACOEMULSIFICATION CLEAR CORNEA WITH STANDARD INTRAOCULAR LENS IMPLANT Left 6/8/2015    Procedure: PHACOEMULSIFICATION CLEAR CORNEA WITH STANDARD INTRAOCULAR LENS IMPLANT;  Surgeon: Nixon Farnsworth MD;  Location:  EC     PHACOEMULSIFICATION CLEAR CORNEA WITH STANDARD INTRAOCULAR LENS IMPLANT Right 6/22/2015    Procedure: PHACOEMULSIFICATION CLEAR CORNEA WITH STANDARD INTRAOCULAR LENS IMPLANT;  Surgeon: Nixon Farnsworth MD;   Location:  EC     SEPTOPLASTY, TURBINOPLASTY, COMBINED Bilateral 2016    Procedure: COMBINED SEPTOPLASTY, TURBINOPLASTY;  Surgeon: Ivan Hernandez MD;  Location:  NONSPECIFIC PROCEDURE      CABG ([employer])     [facility] NONSPECIFIC PROCEDURE      stress echo       Social History   I have reviewed this patient's social history and updated it with pertinent information if needed.  Social History     Tobacco Use     Smoking status: Former Smoker     Packs/day: 1.00     Years: 6.00     Pack years: 6.00     Types: Cigarettes     Start date:      Quit date: 1964     Years since quittin.5     Smokeless tobacco: Never Used   Substance Use Topics     Alcohol use: Yes     Comment: couple drinks a year     Drug use: No       Family History   I have reviewed this patient's family history and updated it with pertinent information if needed.  Family History   Problem Relation Age of Onset     Genitourinary Problems Father         d: age 89; ARF     Hypertension Father      Diabetes Mother         d: age 68, CAD     Heart Disease Mother         MI     Myocardial Infarction Mother      Cardiovascular Brother         d:  age 31; CAD     Heart Disease Brother         age 31, MI     Diabetes Sister         b:  1939; DM2       Prior to Admission Medications   Prior to Admission Medications   Prescriptions Last Dose Informant Patient Reported? Taking?   Cholecalciferol (VITAMIN D3 PO)  Self Yes No   Sig: Take 5,000 Units by mouth daily   Continuous Blood Gluc  (FREESTYLE DELFINA 2 READER) CHRYSTAL   No No   Si Device continuous   Continuous Blood Gluc Sensor (FREESTYLE DELFINA 2 SENSOR) MISC   No No   Si Device every 14 days   apixaban ANTICOAGULANT (ELIQUIS) 5 MG tablet   No No   Sig: Take 1 tablet (5 mg) by mouth 2 times daily   atorvastatin (LIPITOR) 40 MG tablet   No No   Sig: TAKE 1 TABLET (40 MG) BY MOUTH DAILY   azelastine (ASTELIN) 0.1 % nasal spray   No No   Sig: USE 2 SPRAYS INTO  BOTH NOSTRILS TWO TIMES A DAY   blood glucose (NO BRAND SPECIFIED) test strip   No No   Sig: Use to test blood sugar 4 times daily or as directed.   blood glucose calibration (ACCU-CHEK NORMAN) solution   No No   Sig: USE AS DIRECTED   fenofibrate micronized (LOFIBRA) 67 MG capsule   No No   Sig: Take 1 capsule (67 mg) by mouth every morning (before breakfast)   glucose 4 g CHEW   Yes No   Sig: Take 1 tablet by mouth every hour as needed for low blood sugar   Patient not taking: Reported on 10/27/2021   insulin aspart (NOVOLOG FLEXPEN) 100 UNIT/ML pen   No No   Sig: INJECT SUBCUTANEOUSLY THREE TIMES A DAY (BEFORE MEALS) 3 UNITS PER CARB CHOICE (15 GM) APPROX 10-15 UNITS/ MEAL   insulin glargine (LANTUS PEN) 100 UNIT/ML pen   No No   Sig: Inject 66-68 Units Subcutaneous At Bedtime   insulin pen needle (NOVOFINE 30) 30G X 8 MM miscellaneous   No No   Sig: USE AS DIRECTED FOUR TIMES A DAY   levothyroxine (SYNTHROID/LEVOTHROID) 100 MCG tablet   No No   Sig: TAKE ONE TABLET BY MOUTH ONCE DAILY   metoprolol tartrate (LOPRESSOR) 25 MG tablet   No No   Sig: Take 1 tablet (25 mg) by mouth 2 times daily   nitroGLYcerin (NITROSTAT) 0.4 MG sublingual tablet   No No   Sig: Place 1 tablet (0.4 mg) under the tongue every 5 minutes as needed for chest pain      Facility-Administered Medications: None     Allergies   Allergies   Allergen Reactions     Omeprazole      diarrhea     Pantoprazole      diarrhea       Physical Exam   Vital Signs: Temp: 97.7  F (36.5  C) Temp src: Oral BP: 126/79 Pulse: 51   Resp: 18 SpO2: 97 % O2 Device: None (Room air)    Weight: 0 lbs 0 oz    Constitutional: Awake, alert, cooperative, no apparent cardiopulmonary distress.  Eyes: Conjunctiva and pupils examined and normal.  HEENT: Moist mucous membranes, normal dentition.  Respiratory: Clear to auscultation bilaterally, no crackles or wheezing.  Cardiovascular: Regular rate and rhythm, normal S1 and S2, and no murmur noted. 2+ BLE edema, JVP elevated to  just below angle of jaw sitting upright at 90 degrees, positive hepatojugular reflex.   GI: Soft, non-distended, non-tender, normal bowel sounds.  Lymph/Hematologic: No anterior cervical or supraclavicular adenopathy.  Skin: No rashes, no cyanosis, no edema noted on exposed skin.  Musculoskeletal: No joint swelling, erythema or tenderness. No gross bony abnormalities  Neurologic: Cranial nerves 2-12 grossly intact, normal strength and sensation.  Psychiatric: Alert, oriented to person, place and time, no obvious anxiety or depression.      Data   Data reviewed today: I reviewed all medications, new labs and imaging results over the last 24 hours. I personally reviewed the EKG tracing showing afib, rate controlled, the chest x-ray image(s) showing clear lungs, cardiomegaly and the abdominal CT image(s) showing cirrhosis, mild ascites.    Recent Labs   Lab 12/25/21  1145   WBC 5.5   HGB 11.0*   MCV 88         POTASSIUM 4.1   CHLORIDE 107   CO2 28   BUN 23   CR 1.43*   ANIONGAP 4   STARR 10.3*   *   ALBUMIN 3.8   PROTTOTAL 7.1   BILITOTAL 0.7   ALKPHOS 93   ALT 77*   AST 43     Recent Results (from the past 24 hour(s))   Chest XR,  PA & LAT    Narrative    EXAM: XR CHEST 2 VIEW  LOCATION: [facility]  DATE/TIME: 12/25/2021, 11:52 AM    INDICATION: Dyspnea.  COMPARISON: 09/14/2021.      Impression    IMPRESSION: Mild cardiac enlargement has increased slightly. Pulmonary vascularity remains within normal limits. Sternotomy and postoperative changes in the mediastinum are again noted. Lungs clear. No pleural effusions. No pneumothorax.     CT Abdomen Pelvis w Contrast    Narrative    EXAM: CT ABDOMEN AND PELVIS WITH CONTRAST  LOCATION: [facility]  DATE/TIME: 12/25/2021, 12:35 PM    INDICATION: Abdominal pain.  COMPARISON: CT of the abdomen and pelvis performed 03/13/2019.  TECHNIQUE: CT scan of the abdomen and pelvis was performed following injection of  IV contrast. Multiplanar reformats were obtained. Dose reduction techniques were used.  CONTRAST: 106 mL Isovue-370.     FINDINGS:   LOWER CHEST: Small right pleural effusion, new since the previous exam. Mild mosaic attenuation at both lung bases is also new since the previous exam, and may be related to air trapping. Sternotomy.    HEPATOBILIARY: There is diffuse fatty infiltration of the liver. Mild nodularity of the liver contour suggests underlying cirrhosis. Prior cholecystectomy. No hepatic masses are identified. Small amount of ascites.    PANCREAS: Normal.    SPLEEN: Mild splenomegaly has increased slightly. The spleen measures 15.5 cm in length.    ADRENAL GLANDS: Normal.    KIDNEYS/BLADDER: No significant mass, stone, or hydronephrosis.    BOWEL: No bowel obstruction. No evidence for colitis or diverticulitis. Unremarkable appendix.    LYMPH NODES: No lymphadenopathy.    VASCULATURE: Moderate atherosclerotic aortoiliac calcification.    PELVIC ORGANS: Unremarkable.    MUSCULOSKELETAL: Unremarkable.      Impression    IMPRESSION:   1.  Diffuse fatty infiltration of the liver.  2.  Nodularity of the liver contour suggests underlying cirrhosis.  3.  Mild splenomegaly has increased since the previous exam, and may be related to portal hypertension.  4.  Mild ascites is new since the previous exam.  5.  Small right pleural effusion is also new since the previous exam.

## 2021-12-25 NOTE — ED NOTES
[facility]  ED Nurse Handoff Report    ED Chief complaint: Shortness of Breath      ED Diagnosis:   Final diagnoses:   None       Code Status: Full Code    Allergies:   Allergies   Allergen Reactions     Omeprazole      diarrhea     Pantoprazole      diarrhea       Patient Story: Increased SOB for 2 weeks  Focused Assessment:  See ED assessment    Treatments and/or interventions provided: See orders  Patient's response to treatments and/or interventions: See results    To be done/followed up on inpatient unit:  NA    Does this patient have any cognitive concerns?: No    Activity level - Baseline/Home:  Independent  Activity Level - Current:   Independent    Patient's Preferred language: English   Needed?: No    Isolation: None  Infection: Not Applicable  Patient tested for COVID 19 prior to admission: YES  Bariatric?: No    Vital Signs:   Vitals:    12/25/21 1204 12/25/21 1220 12/25/21 1235 12/25/21 1350   BP:   126/79    Pulse:  (!) 43 51 54   Resp:  22 19 21   Temp:       TempSrc:       SpO2: 99% 100% 99% 100%       Cardiac Rhythm:Cardiac Rhythm: Atrial fibrillation    Was the PSS-3 completed:   Yes  What interventions are required if any?               Family Comments: daughter present  OBS brochure/video discussed/provided to patient/family: No              Name of person given brochure if not patient: na              Relationship to patient: na    For the majority of the shift this patient's behavior was Green.   Behavioral interventions performed were na.    ED NURSE PHONE NUMBER: *[phone]

## 2021-12-25 NOTE — PROGRESS NOTES
RECEIVING UNIT ED HANDOFF REVIEW    ED Nurse Handoff Report was reviewed by: Dorys Madera RN on December 25, 2021 at 3:58 PM

## 2021-12-25 NOTE — ED TRIAGE NOTES
Patient with worsening dyspnea on exertion that started 1 week ago. Patient has mild lower abd pain. Hx of afib and kidney disease.

## 2021-12-25 NOTE — PHARMACY-ADMISSION MEDICATION HISTORY
Pharmacy Medication History  Admission medication history interview status for the 12/25/2021  admission is complete. See EPIC admission navigator for prior to admission medications     Location of Interview: Patient room  Medication history sources: Patient    Significant changes made to the medication list:   None     In the past week, patient estimated taking medication this percent of the time: greater than 90%    Additional medication history information:   Patient is a good historian. His report is consistent with chart review and surescripts findings.     Medication reconciliation completed by provider prior to medication history? No    Time spent in this activity: 15 minutes     Prior to Admission medications    Medication Sig Last Dose Taking? Auth Provider   apixaban ANTICOAGULANT (ELIQUIS) 5 MG tablet Take 1 tablet (5 mg) by mouth 2 times daily 12/24/2021 at Unknown time Yes Rebekah Marrero PA-C   atorvastatin (LIPITOR) 40 MG tablet TAKE 1 TABLET (40 MG) BY MOUTH DAILY 12/24/2021 at Unknown time Yes Andrew Elizalde MD   azelastine (ASTELIN) 0.1 % nasal spray USE 2 SPRAYS INTO BOTH NOSTRILS TWO TIMES A DAY 12/24/2021 at Unknown time Yes Andrew Elizalde MD   Cholecalciferol (VITAMIN D3 PO) Take 5,000 Units by mouth daily 12/24/2021 at Unknown time Yes Unknown, Entered By History   fenofibrate micronized (LOFIBRA) 67 MG capsule Take 1 capsule (67 mg) by mouth every morning (before breakfast) 12/24/2021 at Unknown time Yes Andrew Elizalde MD   glucose 4 g CHEW Take 1 tablet by mouth every hour as needed for low blood sugar   at PRN Yes Unknown, Entered By History   insulin aspart (NOVOLOG FLEXPEN) 100 UNIT/ML pen INJECT SUBCUTANEOUSLY THREE TIMES A DAY (BEFORE MEALS) 3 UNITS PER CARB CHOICE (15 GM) APPROX 10-15 UNITS/ MEAL 12/24/2021 at Unknown time Yes Andrew Elizalde MD   insulin glargine (LANTUS PEN) 100 UNIT/ML pen Inject 66-68 Units Subcutaneous At Bedtime 12/24/2021 at Unknown time Yes Andrew Elizalde  MD   levothyroxine (SYNTHROID/LEVOTHROID) 100 MCG tablet TAKE ONE TABLET BY MOUTH ONCE DAILY 12/24/2021 at Unknown time Yes Andrew Elizalde MD   metoprolol tartrate (LOPRESSOR) 25 MG tablet Take 1 tablet (25 mg) by mouth 2 times daily 12/24/2021 at Unknown time Yes Rebekah Marrero PA-C   nitroGLYcerin (NITROSTAT) 0.4 MG sublingual tablet Place 1 tablet (0.4 mg) under the tongue every 5 minutes as needed for chest pain  at PRN Yes Andrew Elizalde MD   blood glucose (NO BRAND SPECIFIED) test strip Use to test blood sugar 4 times daily or as directed.   Andrew Elizalde MD   blood glucose calibration (ACCU-CHEK NORMAN) solution USE AS DIRECTED   Andrew Elizalde MD   Continuous Blood Gluc  (FREESTYLE DELFINA 2 READER) CHRYSTAL 1 Device continuous   Andrew Elizalde MD   Continuous Blood Gluc Sensor (FREESTYLE DELFINA 2 SENSOR) MISC 1 Device every 14 days   Andrew Elizalde MD   insulin pen needle (NOVOFINE 30) 30G X 8 MM miscellaneous USE AS DIRECTED FOUR TIMES A DAY   Andrew Elizalde MD       The information provided in this note is only as accurate as the sources available at the time of update(s)   Xuan Said   Student Pharmacist

## 2021-12-26 ENCOUNTER — APPOINTMENT (OUTPATIENT)
Dept: OCCUPATIONAL THERAPY | Facility: CLINIC | Age: 81
DRG: 291 | End: 2021-12-26
Attending: STUDENT IN AN ORGANIZED HEALTH CARE EDUCATION/TRAINING PROGRAM
Payer: COMMERCIAL

## 2021-12-26 ENCOUNTER — APPOINTMENT (OUTPATIENT)
Dept: CARDIOLOGY | Facility: CLINIC | Age: 81
DRG: 291 | End: 2021-12-26
Attending: STUDENT IN AN ORGANIZED HEALTH CARE EDUCATION/TRAINING PROGRAM
Payer: COMMERCIAL

## 2021-12-26 LAB
ANION GAP SERPL CALCULATED.3IONS-SCNC: 5 MMOL/L (ref 3–14)
BUN SERPL-MCNC: 21 MG/DL (ref 7–30)
C PNEUM DNA SPEC QL NAA+PROBE: NOT DETECTED
CALCIUM SERPL-MCNC: 10.2 MG/DL (ref 8.5–10.1)
CHLORIDE BLD-SCNC: 105 MMOL/L (ref 94–109)
CO2 SERPL-SCNC: 29 MMOL/L (ref 20–32)
CREAT SERPL-MCNC: 1.52 MG/DL (ref 0.66–1.25)
FLUAV H1 2009 PAND RNA SPEC QL NAA+PROBE: NOT DETECTED
FLUAV H1 RNA SPEC QL NAA+PROBE: NOT DETECTED
FLUAV H3 RNA SPEC QL NAA+PROBE: NOT DETECTED
FLUAV RNA SPEC QL NAA+PROBE: NOT DETECTED
FLUBV RNA SPEC QL NAA+PROBE: NOT DETECTED
GFR SERPL CREATININE-BSD FRML MDRD: 46 ML/MIN/1.73M2
GLUCOSE BLD-MCNC: 113 MG/DL (ref 70–99)
GLUCOSE BLDC GLUCOMTR-MCNC: 113 MG/DL (ref 70–99)
GLUCOSE BLDC GLUCOMTR-MCNC: 116 MG/DL (ref 70–99)
GLUCOSE BLDC GLUCOMTR-MCNC: 127 MG/DL (ref 70–99)
GLUCOSE BLDC GLUCOMTR-MCNC: 211 MG/DL (ref 70–99)
GLUCOSE BLDC GLUCOMTR-MCNC: 67 MG/DL (ref 70–99)
GLUCOSE BLDC GLUCOMTR-MCNC: 79 MG/DL (ref 70–99)
GLUCOSE BLDC GLUCOMTR-MCNC: 88 MG/DL (ref 70–99)
HADV DNA SPEC QL NAA+PROBE: NOT DETECTED
HCOV PNL SPEC NAA+PROBE: NOT DETECTED
HMPV RNA SPEC QL NAA+PROBE: NOT DETECTED
HPIV1 RNA SPEC QL NAA+PROBE: NOT DETECTED
HPIV2 RNA SPEC QL NAA+PROBE: NOT DETECTED
HPIV3 RNA SPEC QL NAA+PROBE: NOT DETECTED
HPIV4 RNA SPEC QL NAA+PROBE: NOT DETECTED
LVEF ECHO: NORMAL
M PNEUMO DNA SPEC QL NAA+PROBE: NOT DETECTED
POTASSIUM BLD-SCNC: 3.5 MMOL/L (ref 3.4–5.3)
RSV RNA SPEC QL NAA+PROBE: NOT DETECTED
RSV RNA SPEC QL NAA+PROBE: NOT DETECTED
RV+EV RNA SPEC QL NAA+PROBE: NOT DETECTED
SODIUM SERPL-SCNC: 139 MMOL/L (ref 133–144)

## 2021-12-26 PROCEDURE — 97535 SELF CARE MNGMENT TRAINING: CPT | Mod: GO | Performed by: OCCUPATIONAL THERAPIST

## 2021-12-26 PROCEDURE — 99233 SBSQ HOSP IP/OBS HIGH 50: CPT | Performed by: INTERNAL MEDICINE

## 2021-12-26 PROCEDURE — 250N000012 HC RX MED GY IP 250 OP 636 PS 637: Performed by: STUDENT IN AN ORGANIZED HEALTH CARE EDUCATION/TRAINING PROGRAM

## 2021-12-26 PROCEDURE — 93306 TTE W/DOPPLER COMPLETE: CPT

## 2021-12-26 PROCEDURE — 97165 OT EVAL LOW COMPLEX 30 MIN: CPT | Mod: GO | Performed by: OCCUPATIONAL THERAPIST

## 2021-12-26 PROCEDURE — 80048 BASIC METABOLIC PNL TOTAL CA: CPT | Performed by: STUDENT IN AN ORGANIZED HEALTH CARE EDUCATION/TRAINING PROGRAM

## 2021-12-26 PROCEDURE — 36415 COLL VENOUS BLD VENIPUNCTURE: CPT | Performed by: STUDENT IN AN ORGANIZED HEALTH CARE EDUCATION/TRAINING PROGRAM

## 2021-12-26 PROCEDURE — 93005 ELECTROCARDIOGRAM TRACING: CPT

## 2021-12-26 PROCEDURE — 99221 1ST HOSP IP/OBS SF/LOW 40: CPT | Performed by: INTERNAL MEDICINE

## 2021-12-26 PROCEDURE — 250N000013 HC RX MED GY IP 250 OP 250 PS 637: Performed by: STUDENT IN AN ORGANIZED HEALTH CARE EDUCATION/TRAINING PROGRAM

## 2021-12-26 PROCEDURE — 87486 CHLMYD PNEUM DNA AMP PROBE: CPT | Performed by: STUDENT IN AN ORGANIZED HEALTH CARE EDUCATION/TRAINING PROGRAM

## 2021-12-26 PROCEDURE — 93306 TTE W/DOPPLER COMPLETE: CPT | Mod: 26 | Performed by: INTERNAL MEDICINE

## 2021-12-26 PROCEDURE — 250N000011 HC RX IP 250 OP 636: Performed by: INTERNAL MEDICINE

## 2021-12-26 PROCEDURE — 210N000002 HC R&B HEART CARE

## 2021-12-26 RX ORDER — FUROSEMIDE 10 MG/ML
20 INJECTION INTRAMUSCULAR; INTRAVENOUS
Status: DISCONTINUED | OUTPATIENT
Start: 2021-12-26 | End: 2021-12-27

## 2021-12-26 RX ORDER — POTASSIUM CHLORIDE 1500 MG/1
20 TABLET, EXTENDED RELEASE ORAL ONCE
Status: COMPLETED | OUTPATIENT
Start: 2021-12-26 | End: 2021-12-26

## 2021-12-26 RX ADMIN — INSULIN ASPART 6 UNITS: 100 INJECTION, SOLUTION INTRAVENOUS; SUBCUTANEOUS at 08:15

## 2021-12-26 RX ADMIN — APIXABAN 5 MG: 5 TABLET, FILM COATED ORAL at 20:27

## 2021-12-26 RX ADMIN — FUROSEMIDE 20 MG: 10 INJECTION, SOLUTION INTRAVENOUS at 15:54

## 2021-12-26 RX ADMIN — INSULIN GLARGINE 68 UNITS: 100 INJECTION, SOLUTION SUBCUTANEOUS at 21:42

## 2021-12-26 RX ADMIN — APIXABAN 5 MG: 5 TABLET, FILM COATED ORAL at 08:14

## 2021-12-26 RX ADMIN — AZELASTINE HYDROCHLORIDE 2 SPRAY: 137 SPRAY, METERED NASAL at 20:27

## 2021-12-26 RX ADMIN — ATORVASTATIN CALCIUM 40 MG: 40 TABLET, FILM COATED ORAL at 08:14

## 2021-12-26 RX ADMIN — INSULIN ASPART 4 UNITS: 100 INJECTION, SOLUTION INTRAVENOUS; SUBCUTANEOUS at 17:48

## 2021-12-26 RX ADMIN — FENOFIBRATE 54 MG: 54 TABLET ORAL at 08:14

## 2021-12-26 RX ADMIN — INSULIN ASPART 9 UNITS: 100 INJECTION, SOLUTION INTRAVENOUS; SUBCUTANEOUS at 13:39

## 2021-12-26 RX ADMIN — AZELASTINE HYDROCHLORIDE 2 SPRAY: 137 SPRAY, METERED NASAL at 08:14

## 2021-12-26 RX ADMIN — LEVOTHYROXINE SODIUM 100 MCG: 100 TABLET ORAL at 06:44

## 2021-12-26 RX ADMIN — POTASSIUM CHLORIDE 20 MEQ: 1500 TABLET, EXTENDED RELEASE ORAL at 06:44

## 2021-12-26 ASSESSMENT — ACTIVITIES OF DAILY LIVING (ADL)
ADLS_ACUITY_SCORE: 8
ADLS_ACUITY_SCORE: 10
ADLS_ACUITY_SCORE: 8
PREVIOUS_RESPONSIBILITIES: MEAL PREP;HOUSEKEEPING;LAUNDRY;SHOPPING;MEDICATION MANAGEMENT;FINANCES;DRIVING
ADLS_ACUITY_SCORE: 8

## 2021-12-26 ASSESSMENT — MIFFLIN-ST. JEOR: SCORE: 1692.85

## 2021-12-26 NOTE — CONSULTS
[facility] Consultation     Pascual Perea  [address]  [address]  [facility] [address]  81 year old male     Admission Date/Time: 12/25/2021  Primary Care Provider: Andrew Elizalde  Referring / Attending Physician:  Elian     We were asked to see the patient in consultation by Dr. Plummer for evaluation of cirrhosis.    ASSESSMENT/RECS:    81-year-old gentleman with a history of afib, heart failure, pulmonary hypertension, obstructive sleep apnea,  stage 3 CKD, metabolic syndrome (hyperTG, type 2 diabetes, BMI 30) with imaging and liver chemistries consistent with cirrhosis likely related to fatty liver disease.  There is scant ascites on CT, not enough for paracentesis.  His INR is reflective of mild synthetic dysfunction, however, congestive hepatopathy may also be contributing.     Compensated NAFLD cirrhosis   -Weight loss and strict avoidance of fructose and other refined carbs is encouraged  -Low Na diet  -Should be followed as an outpt in our liver clinic for HCC screening and at some point, variceal screening.  Our office will arrange this appointment.    Normocytic anemia   -No evidence of GI blood loss    Thank you for involving us in this patient's care.  Please call me with any questions.  Will sign off.     Erik Baca DO   [facility]  Cell [phone]    ________________________________________________________________________        CC: SOB     HPI:  Pascual Perea is a 81 year old male who we are asked to see for history of possible cirrhosis.  He was admitted yesterday with SOB, PEREZ and worsening LE edema.      CT scan demonstrated spleen of 15.5 cm in length diffuse nodularity and fatty changes to the liver.  Liver chemistries are currently consistent with fatty liver disease with an ALT of 77, AST 43.  Review of prior liver chemistries show that in 3/2019 he had significantly elevated LFTs in March 8.5 , ,  --> negative EUS for choledocholithiasis while  quite ill with GNR bacteremia, went on to get tarah.  Amiodarone, stopped due to LFTs and bradycardia in 2019.    BNP 7439 pg/ml on admission.    The patient is currently without abdominal pain.  He does not drink alcohol significantly.  He denies any high risk behavior for viral hepatitis.  There is no family history of liver disease.    Admits to normal stools, tendency toward constipation.    Discussed with the patient that he likely has advanced liver disease, probable cirrhosis, related to NAFLD.        ROS: A comprehensive ten point review of systems was negative aside from those in mentioned in the HPI.      PAST MEDICAL HISTORY:  Patient Active Problem List    Diagnosis Date Noted     Heart failure (H) 12/25/2021     Priority: Medium     Dyspnea 12/25/2021     Priority: Medium     Hyperparathyroidism (H) 11/29/2020     Priority: Medium     CKD (chronic kidney disease) stage 3, GFR 30-59 ml/min (H) 05/20/2019     Priority: Medium     Atrial fibrillation, unspecified type (H) 05/02/2018     Priority: Medium     Type 2 diabetes mellitus without complication, with long-term current use of insulin (H) 02/21/2017     Priority: Medium     Hypothyroidism, unspecified type 08/15/2016     Priority: Medium     Gastroesophageal reflux disease without esophagitis 04/27/2016     Priority: Medium     Atrial flutter (H) 04/15/2016     Priority: Medium     BRANDON (obstructive sleep apnea) 12/07/2012     Priority: Medium     Health Care Home 04/02/2012     Priority: Medium     FPA [facility] for .  Bertha MANN    [phone]   DX V65.8 REPLACED WITH [id number]ALTH CARE HOME (04/08/2013)       Vitamin D deficiency 09/03/2011     Priority: Medium     Advanced directives, counseling/discussion 08/25/2011     Priority: Medium     Advance Directive Problem List Overview:   Name Relationship Phone    Primary Health Care Agent            Alternative Health Care Agent          Patient states has Advance Directive  and will bring in a copy to clinic. 2011          Osteopenia      Priority: Medium     Hyperlipidemia LDL goal <70 2011     Priority: Medium     Testosterone deficiency 2009     Priority: Medium     Allergic rhinitis 2008     Priority: Medium     Problem list name updated by automated process. Provider to review       Coronary atherosclerosis      Priority: Medium     Nuc Stress 4/15/16: On both stress and rest images there is a very small size mild intensity apical photopenic defect which is of equal extent and intensity on both stress and rest images. Gated images demonstrated no regional wall motion abnormalities. The left ventricular ejection fraction was calculated to be 61% with stress.       Impotence of organic origin      Priority: Medium     Essential hypertension, benign 2003     Priority: Medium     SOCIAL HISTORY:  Social History     Tobacco Use     Smoking status: Former Smoker     Packs/day: 1.00     Years: 6.00     Pack years: 6.00     Types: Cigarettes     Start date:      Quit date: 1964     Years since quittin.5     Smokeless tobacco: Never Used   Substance Use Topics     Alcohol use: Yes     Comment: couple drinks a year     Drug use: No     FAMILY HISTORY:  Family History   Problem Relation Age of Onset     Genitourinary Problems Father         d: age 89; ARF     Hypertension Father      Diabetes Mother         d: age 68, CAD     Heart Disease Mother         MI     Myocardial Infarction Mother      Cardiovascular Brother         d:  age 31; CAD     Heart Disease Brother         age 31, MI     Diabetes Sister         b:  1939; DM2     ALLERGIES:   Allergies   Allergen Reactions     Omeprazole      diarrhea     Pantoprazole      diarrhea     MEDICATIONS:   Current Facility-Administered Medications   Medication     acetaminophen (TYLENOL) tablet 650 mg    Or     acetaminophen (TYLENOL) Suppository 650 mg     apixaban ANTICOAGULANT (ELIQUIS) tablet 5 mg      "atorvastatin (LIPITOR) tablet 40 mg     azelastine (ASTELIN) nasal spray 2 spray     Continuing beta blocker from home medication list OR beta blocker order already placed during this visit     glucose gel 15-30 g    Or     dextrose 50 % injection 25-50 mL    Or     glucagon injection 1 mg     fenofibrate (LOFIBRA) tablet 54 mg     furosemide (LASIX) injection 20 mg     HOLD: nitroGLYcerin IF     insulin aspart (NovoLOG) injection (RAPID ACTING)     insulin aspart (NovoLOG) injection (RAPID ACTING)     insulin aspart (NovoLOG) injection (RAPID ACTING)     insulin glargine (LANTUS PEN) injection 68 Units     levothyroxine (SYNTHROID/LEVOTHROID) tablet 100 mcg     lidocaine (LMX4) cream     lidocaine 1 % 0.1-1 mL     melatonin tablet 1 mg     [Held by provider] metoprolol tartrate (LOPRESSOR) tablet 25 mg     ondansetron (ZOFRAN-ODT) ODT tab 4 mg    Or     ondansetron (ZOFRAN) injection 4 mg     Patient is already receiving anticoagulation with heparin, enoxaparin (LOVENOX), warfarin (COUMADIN)  or other anticoagulant medication     polyethylene glycol (MIRALAX) Packet 17 g     prochlorperazine (COMPAZINE) injection 5 mg    Or     prochlorperazine (COMPAZINE) tablet 5 mg    Or     prochlorperazine (COMPAZINE) suppository 12.5 mg     Reason ACE/ARB/ARNI order not selected     senna-docusate (SENOKOT-S/PERICOLACE) 8.6-50 MG per tablet 1 tablet    Or     senna-docusate (SENOKOT-S/PERICOLACE) 8.6-50 MG per tablet 2 tablet     sodium chloride (PF) 0.9% PF flush 3 mL     sodium chloride (PF) 0.9% PF flush 3 mL     PHYSICAL EXAM:   /72 (BP Location: Right arm)   Pulse 63   Temp 98.8  F (37.1  C) (Oral)   Resp 18   Ht 1.803 m (5' 11\")   Wt 96.1 kg (211 lb 12.8 oz)   SpO2 94%   BMI 29.54 kg/m     GEN: Alert, oriented x3, communicative and in NAD.  CRISTY: AT, anicteric, OP without erythema, exudate, or ulcers.    NECK: Supple.    LYMPH: No LAD noted.  HRT: Irreg  LUNGS: CTA  ABD: Obese, +BS, no guarding or pain to " palpation, no rebound, no HSM.  SKIN: No rash, jaundice or spider angiomata  MSKL: LE free of edema, strength 5/5 all 4 extrems  NEURO: CN grossly intact, sensation intact to light touch, toes downgoing.     ADDITIONAL DATA:   I reviewed the patient's new clinical lab test results.   Recent Labs   Lab Test 12/25/21  1145 04/13/20  1450 02/18/20  0926 09/30/19  0839 05/22/19  0948 03/18/19  0941 03/17/19  0541 03/15/19  0553 03/14/19  0847   WBC 5.5 4.3 3.4*   < >  --    < > 6.1   < >  --    HGB 11.0* 13.4 12.1*   < >  --    < > 13.1*   < >  --    MCV 88 87 86   < >  --    < > 85   < >  --     167 124*   < >  --    < > 119*   < >  --    INR  --   --   --   --  1.43*  --  1.09  --  1.37*    < > = values in this interval not displayed.     Recent Labs   Lab Test 12/26/21  0553 12/25/21  1800 12/25/21  1145 12/15/21  1323   POTASSIUM 3.5 4.0 4.1 4.2   CHLORIDE 105  --  107 107   CO2 29  --  28 24   BUN 21  --  23 21   ANIONGAP 5  --  4 8     Recent Labs   Lab Test 12/25/21  1333 12/25/21  1145 06/10/21  0858 04/20/21  1200 03/19/20  0957 02/18/20  1122 03/18/19  0941 03/17/19  1543 03/13/19  2210 03/13/19  2020   ALBUMIN  --  3.8 3.9 3.8   < >  --    < >  --    < > 3.5   BILITOTAL  --  0.7 0.6 1.0   < >  --    < >  --    < > 8.5*   ALT  --  77* 37 54   < >  --    < >  --    < > 271*   AST  --  43 27 34   < >  --    < >  --    < > 249*   PROTEIN Negative  --   --   --   --  Negative  --  10*   < >  --    LIPASE  --   --   --   --   --   --   --   --   --  121    < > = values in this interval not displayed.        I reviewed the patient's new imaging results.

## 2021-12-26 NOTE — PROGRESS NOTES
12/26/21 0800   Quick Adds   Type of Visit Initial Occupational Therapy Evaluation   Living Environment   People in home child(sharita), adult   Current Living Arrangements independent living facility   Home Accessibility no concerns   Transportation Anticipated agency   Living Environment Comments Walk in shower with grab bar, standard toilet   Self-Care   Usual Activity Tolerance good   Current Activity Tolerance fair   Regular Exercise No   Equipment Currently Used at Home   (walking stick)   Activity/Exercise/Self-Care Comment Pt independent with ADLs prior to admission   Instrumental Activities of Daily Living (IADL)   Previous Responsibilities meal prep;housekeeping;laundry;shopping;medication management;finances;driving   IADL Comments Independent with IADLs prior to admission   Disability/Function   Fall history within last six months no   Change in Functional Status Since Onset of Current Illness/Injury yes   General Information   Onset of Illness/Injury or Date of Surgery 12/25/21   Referring Physician Moiz Burgos MD   Patient/Family Therapy Goal Statement (OT) Pt would like to improve activity tolerance and return to baseline   Additional Occupational Profile Info/Pertinent History of Current Problem 81 year old male admitted on 12/25/2021. He presented with progressive SOB. Likely CHF.   Existing Precautions/Restrictions cardiac;fall   Cognitive Status Examination   Orientation Status orientation to person, place and time   Affect/Mental Status (Cognitive) WNL   Follows Commands WNL   Sensory   Sensory Comments Intact per pt report   Pain Assessment   Patient Currently in Pain Yes, see Vital Sign flowsheet   Integumentary/Edema   Integumentary/Edema Comments Significant abdominal swelling   Posture   Posture Comments WFL   Range of Motion Comprehensive   Comment, General Range of Motion Limited trunk ROM due to abdominal swelling   Strength Comprehensive (MMT)   Comment, General Manual Muscle  Testing (MMT) Assessment Intact   Coordination   Coordination Comments Intact   Bed Mobility   Comment (Bed Mobility) SBA   Transfers   Transfer Comments SBA   Balance   Balance Comments Intact   Activities of Daily Living   BADL Assessment bathing;toileting   Bathing Assessment   Comment (Bathing) Tolerance for bathing impaired due to limited activity tolerance   Toileting   Comment (Toileting) Tolerance for toileting impaired due to limited activity tolerance   Clinical Impression   Criteria for Skilled Therapeutic Interventions Met (OT) yes;meets criteria;skilled treatment is necessary   OT Diagnosis Decline in ADL tolerance and safety   OT Problem List-Impairments impacting ADL problems related to;activity tolerance impaired   Assessment of Occupational Performance 3-5 Performance Deficits   Identified Performance Deficits Impaired activity/ADL tolerance   Planned Therapy Interventions (OT) home program guidelines;progressive activity/exercise;risk factor education   Clinical Decision Making Complexity (OT) low complexity   Therapy Frequency (OT) Daily   Predicted Duration of Therapy 2 days   Anticipated Equipment Needs Upon Discharge (OT)   (N/A)   Risk & Benefits of therapy have been explained evaluation/treatment results reviewed;care plan/treatment goals reviewed;risks/benefits reviewed;current/potential barriers reviewed;participants voiced agreement with care plan;participants included;patient   OT Discharge Planning    OT Discharge Recommendation (DC Rec) home   OT Rationale for DC Rec Pt ADL independence currently limited by poor activity tolerance secondary to CHF. Appears as though pt will progress to baseline once medically managed with inpatient OT.   OT Brief overview of current status  SBA for mobility   Total Evaluation Time (Minutes)   Total Evaluation Time (Minutes) 9

## 2021-12-26 NOTE — PROVIDER NOTIFICATION
Dr. Dykes paged regarding pauses up to 2 seconds. Patient asymptomatic, BP stable. Per MD, hold metoprolol and continue to monitor.

## 2021-12-26 NOTE — PROGRESS NOTES
Hospitalist Cross Cover    Notified by RN that patient had 3 pauses on telemetry tonight. Longest lasted 2 seconds. HR as low as 48 but mostly 50-55 on tele. Asymptomatic.     Will hold metoprolol for now. Cont tele.     Noa Dykes,   Internal Medicine - Hospitalist  12/25/2021  8:52 PM

## 2021-12-26 NOTE — PROGRESS NOTES
Paged for 3 second pauses on telemetry, heart rates dip do 40's then back to 60's. Normotensive without other acute complaints as per nursing staff. Metoprolol had been held last night; we continue to hold it now; EKG ordered and Cardiology consulted for this AM.

## 2021-12-26 NOTE — PLAN OF CARE
Patient arrived to unit @ 1700. VSS. Denies pain. PEREZ. Lung sounds are clear. Up independently. Tele Afib CVR with inverted T waves.+2 edema to BLE.  Plan for GI and cardiology consult and echo tomorrow.

## 2021-12-26 NOTE — CONSULTS
[facility]    Cardiology Consultation     Date of Admission:  12/25/2021    Assessment & Plan   Pascual Perea is a 81 year old male who was admitted on 12/25/2021. I was asked to see the patient for acute on chronic CHF.    The patient presented with abdominal bloating and dyspnea.  His lasix was stopped by PCP in September due to elevated calcium levels.    He has been started on Lasix and feels improved.  However he has continued abdominal bloating.    He has a cirrhotic appearing liver on CT scan and GI is consulted.    He has a history of permanent Afib and amiodarone was stopped due to LFT elevations.    CXR noted no pleural effusions, BNP elevated to 7,439.      Echo was ordered and pending.     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  His abdomen seems distended and I would differ to GI if an abdominal ultrasound is warranted.    EKG seems largely unchanged from prior but does have ST abnormalities in the inferior and lateral leads.  The patient denies chest pain and had a non-ischemic appearing stress test on 6/17/2021.       Speedy Brantley MD     Code Status    No CPR- Do NOT Intubate    Reason for Consult   Reason for consult: CHF    Primary Care Physician   Andrew Elizalde    Chief Complaint   CHF    History is obtained from the patient    History of Present Illness   Pascual Perea is a 81 year old male who was admitted on 12/25/2021. I was asked to see the patient for acute on chronic CHF.    The patient presented with abdominal bloating and dyspnea.  His lasix was stopped by PCP in September due to elevated calcium levels.    He has been started on Lasix and feels improved.   However he has continued abdominal bloating.    He has a cirrhotic appearing liver on CT scan and GI is consulted.    He has a history of permanent Afib and amiodarone was stopped due to LFT elevations.    CXR noted no pleural effusions, BNP elevated to 7,439.      Echo was ordered and pending.     Past Medical History   I have reviewed this patient's medical history and updated it with pertinent information if needed.   Past Medical History:   Diagnosis Date     Adhesive capsulitis of shoulder      Anemia 3/10/2014     Anemia, unspecified      Antiplatelet or antithrombotic long-term use      Arrhythmia     Atrial fib/flutter     CAD (coronary artery disease)      History of cardioversion 04/24/2018    a single synchronized shock of 120 joules restored normal sinus rhythm     History of cardioversion 02/13/2019    single shock , 200 joules successul in restoring NSR     Hyperlipidemia LDL goal <70 5/26/2011     Hypertension      Hypothyroidism      Impotence of organic origin      Laceration of finger  June 2010     left thumb s/p sutures     Numbness and tingling     diabetic neuropathy bilateral feet     BRANDON (obstructive sleep apnea)     intolerant of CPAP, uses it occasionally.  Using mandibular device occasionally     Osteopenia      Pulmonary hypertension (H) 4/6/2012     Sensorineural hearing loss, unspecified      Stented coronary artery 1997    CABG      Testosterone deficiency      Type 2 diabetes mellitus without complication  (goal A1C<8) 10/24/2015     Typical atrial flutter (H) 4/18/16     Unspecified hypothyroidism      Vitamin D deficiency 9/3/2011       Past Surgical History   I have reviewed this patient's surgical history and updated it with pertinent information if needed.  Past Surgical History:   Procedure Laterality Date     ANESTHESIA CARDIOVERSION N/A 2/13/2019    Procedure: ANESTHESIA CARDIOVERSION;  Surgeon: GENERIC ANESTHESIA PROVIDER;  Location:  OR     ANESTHESIA CARDIOVERSION N/A  2019    Procedure: ANESTHESIA, FOR CARDIOVERSION;  Surgeon: GENERIC ANESTHESIA PROVIDER;  Location:  OR     CARDIAC SURGERY      bypass in      CARDIOVERSION  2018     COLONOSCOPY       CORONARY ANGIOGRAPHY ADULT ORDER      2012     CORONARY ARTERY BYPASS      [facility]     ENDOSCOPIC ULTRASOUND UPPER GASTROINTESTINAL TRACT (GI) N/A 3/14/2019    Procedure: ENDOSCOPIC ULTRASOUND OF UPPER GASTROINTESTINAL TRACT;  Surgeon: Ju Torres MD;  Location:  OR     EXCISE MASS HEAD Left 2016    Procedure: EXCISE MASS HEAD;  Surgeon: Ivan Hernandez MD;  Location: [facility]     HEART CATH, ANGIOPLASTY  2012     LAPAROSCOPIC CHOLECYSTECTOMY N/A 3/17/2019    Procedure: LAPAROSCOPIC CHOLECYSTECTOMY;  Surgeon: Janel Paz MD;  Location:  OR     PHACOEMULSIFICATION CLEAR CORNEA WITH STANDARD INTRAOCULAR LENS IMPLANT Left 2015    Procedure: PHACOEMULSIFICATION CLEAR CORNEA WITH STANDARD INTRAOCULAR LENS IMPLANT;  Surgeon: Nixon Farnsworth MD;  Location:  [facility]     PHACOEMULSIFICATION CLEAR CORNEA WITH STANDARD INTRAOCULAR LENS IMPLANT Right 2015    Procedure: PHACOEMULSIFICATION CLEAR CORNEA WITH STANDARD INTRAOCULAR LENS IMPLANT;  Surgeon: Nixon Farnsworth MD;  Location:  [facility]     SEPTOPLASTY, TURBINOPLASTY, COMBINED Bilateral 2016    Procedure: COMBINED SEPTOPLASTY, TURBINOPLASTY;  Surgeon: Ivan Hernandez MD;  Location: [facility] NONSPECIFIC PROCEDURE      CABG ([place])     [facility] NONSPECIFIC PROCEDURE      stress echo       Prior to Admission Medications   Prior to Admission Medications   Prescriptions Last Dose Informant Patient Reported? Taking?   Cholecalciferol (VITAMIN D3 PO) 2021 at Unknown time Self Yes Yes   Sig: Take 5,000 Units by mouth daily   Continuous Blood Gluc  (FREESTYLE DELFINA 2 READER) CHRYSTAL  Self No No   Si Device continuous   Continuous Blood Gluc Sensor (FREESTYLE DELFINA 2  SENSOR) MISC  Self No No   Si Device every 14 days   apixaban ANTICOAGULANT (ELIQUIS) 5 MG tablet 2021 at Unknown time Self No Yes   Sig: Take 1 tablet (5 mg) by mouth 2 times daily   atorvastatin (LIPITOR) 40 MG tablet 2021 at Unknown time Self No Yes   Sig: TAKE 1 TABLET (40 MG) BY MOUTH DAILY   azelastine (ASTELIN) 0.1 % nasal spray 2021 at Unknown time Self No Yes   Sig: USE 2 SPRAYS INTO BOTH NOSTRILS TWO TIMES A DAY   blood glucose (NO BRAND SPECIFIED) test strip  Self No No   Sig: Use to test blood sugar 4 times daily or as directed.   blood glucose calibration (ACCU-CHEK NORMAN) solution  Self No No   Sig: USE AS DIRECTED   fenofibrate micronized (LOFIBRA) 67 MG capsule 2021 at Unknown time Self No Yes   Sig: Take 1 capsule (67 mg) by mouth every morning (before breakfast)   glucose 4 g CHEW  at PRN Self Yes Yes   Sig: Take 1 tablet by mouth every hour as needed for low blood sugar    insulin aspart (NOVOLOG FLEXPEN) 100 UNIT/ML pen 2021 at Unknown time Self No Yes   Sig: INJECT SUBCUTANEOUSLY THREE TIMES A DAY (BEFORE MEALS) 3 UNITS PER CARB CHOICE (15 GM) APPROX 10-15 UNITS/ MEAL   insulin glargine (LANTUS PEN) 100 UNIT/ML pen 2021 at Unknown time Self No Yes   Sig: Inject 66-68 Units Subcutaneous At Bedtime   insulin pen needle (NOVOFINE 30) 30G X 8 MM miscellaneous  Self No No   Sig: USE AS DIRECTED FOUR TIMES A DAY   levothyroxine (SYNTHROID/LEVOTHROID) 100 MCG tablet 2021 at Unknown time Self No Yes   Sig: TAKE ONE TABLET BY MOUTH ONCE DAILY   metoprolol tartrate (LOPRESSOR) 25 MG tablet 2021 at Unknown time Self No Yes   Sig: Take 1 tablet (25 mg) by mouth 2 times daily   nitroGLYcerin (NITROSTAT) 0.4 MG sublingual tablet  at PRN Self No Yes   Sig: Place 1 tablet (0.4 mg) under the tongue every 5 minutes as needed for chest pain      Facility-Administered Medications: None     Allergies   Allergies   Allergen Reactions     Omeprazole      diarrhea      Pantoprazole      diarrhea       Social History   I have reviewed this patient's social history and updated it with pertinent information if needed. Pascual Perea  reports that he quit smoking about 57 years ago. His smoking use included cigarettes. He started smoking about 64 years ago. He has a 6.00 pack-year smoking history. He has never used smokeless tobacco. He reports current alcohol use. He reports that he does not use drugs.    Family History   I have reviewed this patient's family history and updated it with pertinent information if needed.   Family History   Problem Relation Age of Onset     Genitourinary Problems Father         d: age 89; ARF     Hypertension Father      Diabetes Mother         d: age 68, CAD     Heart Disease Mother         MI     Myocardial Infarction Mother      Cardiovascular Brother         d:  age 31; CAD     Heart Disease Brother         age 31, MI     Diabetes Sister         b:  1939; DM2       Review of Systems   The 12 point Review of Systems is negative other than noted in the HPI or here.     Physical Exam   Temp: 98.8  F (37.1  C) Temp src: Oral BP: 134/72 Pulse: 63   Resp: 18 SpO2: 94 % O2 Device: None (Room air)    Vital Signs with Ranges  Temp:  [97.7  F (36.5  C)-98.8  F (37.1  C)] 98.8  F (37.1  C)  Pulse:  [43-67] 63  Resp:  [18-28] 18  BP: (111-153)/(45-79) 134/72  SpO2:  [94 %-100 %] 94 %  211 lbs 12.8 oz    GENERAL APPEARANCE: Alert and oriented x3. No acute distress.  SKIN: Inspection of the skin reveals no rashes, ulcerations, warm, dry  NECK: Supple and symmetric.  elevated JVP  LUNGS: very faint crackles in bases, mostly clear   CARDIOVASCULAR: S1, S2, regular rate and irregular rhythm without any murmurs, gallops, rubs.   ABDOMEN: soft but distended   EXTREMITIES: trace edema.  NEUROLOGIC:  Normal mood and affect.  Sensation to touch was normal.      Data   Results for orders placed or performed during the hospital encounter of 12/25/21 (from the past 24  hour(s))   CBC with platelets + differential    Narrative    The following orders were created for panel order CBC with platelets + differential.  Procedure                               Abnormality         Status                     ---------                               -----------         ------                     CBC with platelets and d...[[id number]]  Abnormal            Final result                 Please view results for these tests on the individual orders.   Comprehensive metabolic panel   Result Value Ref Range    Sodium 139 133 - 144 mmol/L    Potassium 4.1 3.4 - 5.3 mmol/L    Chloride 107 94 - 109 mmol/L    Carbon Dioxide (CO2) 28 20 - 32 mmol/L    Anion Gap 4 3 - 14 mmol/L    Urea Nitrogen 23 7 - 30 mg/dL    Creatinine 1.43 (H) 0.66 - 1.25 mg/dL    Calcium 10.3 (H) 8.5 - 10.1 mg/dL    Glucose 164 (H) 70 - 99 mg/dL    Alkaline Phosphatase 93 40 - 150 U/L    AST 43 0 - 45 U/L    ALT 77 (H) 0 - 70 U/L    Protein Total 7.1 6.8 - 8.8 g/dL    Albumin 3.8 3.4 - 5.0 g/dL    Bilirubin Total 0.7 0.2 - 1.3 mg/dL    GFR Estimate 50 (L) >60 mL/min/1.73m2   BNP   Result Value Ref Range    N terminal Pro BNP Inpatient 7,439 (H) 0-1,800 pg/mL   Troponin I (now)   Result Value Ref Range    Troponin I High Sensitivity 71 <79 ng/L   CBC with platelets and differential   Result Value Ref Range    WBC Count 5.5 4.0 - 11.0 10e3/uL    RBC Count 4.08 (L) 4.40 - 5.90 10e6/uL    Hemoglobin 11.0 (L) 13.3 - 17.7 g/dL    Hematocrit 35.8 (L) 40.0 - 53.0 %    MCV 88 78 - 100 fL    MCH 27.0 26.5 - 33.0 pg    MCHC 30.7 (L) 31.5 - 36.5 g/dL    RDW 14.6 10.0 - 15.0 %    Platelet Count 224 150 - 450 10e3/uL    % Neutrophils 69 %    % Lymphocytes 18 %    % Monocytes 9 %    % Eosinophils 3 %    % Basophils 1 %    % Immature Granulocytes 0 %    NRBCs per 100 WBC 0 <1 /100    Absolute Neutrophils 3.8 1.6 - 8.3 10e3/uL    Absolute Lymphocytes 1.0 0.8 - 5.3 10e3/uL    Absolute Monocytes 0.5 0.0 - 1.3 10e3/uL    Absolute Eosinophils 0.2 0.0 -  0.7 10e3/uL    Absolute Basophils 0.1 0.0 - 0.2 10e3/uL    Absolute Immature Granulocytes 0.0 <=0.4 10e3/uL    Absolute NRBCs 0.0 10e3/uL   Asymptomatic COVID-19 Virus (Coronavirus) by PCR Nasopharyngeal    Specimen: Nasopharyngeal; Swab   Result Value Ref Range    SARS CoV2 PCR Negative Negative    Narrative    Testing was performed using the Xpert Xpress SARS-CoV-2 Assay on the   Cepheid Gene-Xpert Instrument Systems. Additional information about   this Emergency Use Authorization (EUA) assay can be found via the Lab   Guide. This test should be ordered for the detection of SARS-CoV-2 in   individuals who meet SARS-CoV-2 clinical and/or epidemiological   criteria. Test performance is unknown in asymptomatic patients. This   test is for in vitro diagnostic use under the FDA EUA for   laboratories certified under CLIA to perform high complexity testing.   This test has not been FDA cleared or approved. A negative result   does not rule out the presence of PCR inhibitors in the specimen or   target RNA in concentration below the limit of detection for the   assay. The possibility of a false negative should be considered if   the patient's recent exposure or clinical presentation suggests   COVID-19. This test was validated by the [facility]. This laboratory is certified under the Clinical Laboratory Improvement Amendments of 1988 (CLIA-88) as qualified to perform high complexity laboratory testing.     [place] Draw    Narrative    The following orders were created for panel order [place] Draw.  Procedure                               Abnormality         Status                     ---------                               -----------         ------                     Extra Blue Top Tube[[id number]]                              Final result               Extra Red Top Tube[[id number]]                               Final result               Extra Blood Bank Purple ...[[id number]]                       Final result                 Please view results for these tests on the individual orders.   Extra Blue Top Tube   Result Value Ref Range    Hold Specimen JIC    Extra Red Top Tube   Result Value Ref Range    Hold Specimen JIC    Extra Blood Bank Purple Top Tube   Result Value Ref Range    Hold Specimen JIC    EKG 12 lead   Result Value Ref Range    Systolic Blood Pressure  mmHg    Diastolic Blood Pressure  mmHg    Ventricular Rate 56 BPM    Atrial Rate 197 BPM    AL Interval  ms    QRS Duration 120 ms     ms    QTc 472 ms    P Axis  degrees    R AXIS 63 degrees    T Axis 238 degrees    Interpretation ECG       Atrial fibrillation with slow ventricular response  Inferior infarct , age undetermined  Possible Anterior infarct , age undetermined  ST & T wave abnormality, consider lateral ischemia  Abnormal ECG  When compared with ECG of 18-FEB-2020 09:39,  Significant changes have occurred  Confirmed by GENERATED REPORT, COMPUTER ([id number]),  RAINA CASTANO ([id number]) on 12/25/2021 12:54:30 PM     Chest XR,  PA & LAT    Narrative    EXAM: XR CHEST 2 VIEW  LOCATION: [facility]  DATE/TIME: 12/25/2021, 11:52 AM    INDICATION: Dyspnea.  COMPARISON: 09/14/2021.      Impression    IMPRESSION: Mild cardiac enlargement has increased slightly. Pulmonary vascularity remains within normal limits. Sternotomy and postoperative changes in the mediastinum are again noted. Lungs clear. No pleural effusions. No pneumothorax.     CT Abdomen Pelvis w Contrast    Narrative    EXAM: CT ABDOMEN AND PELVIS WITH CONTRAST  LOCATION: [facility]  DATE/TIME: 12/25/2021, 12:35 PM    INDICATION: Abdominal pain.  COMPARISON: CT of the abdomen and pelvis performed 03/13/2019.  TECHNIQUE: CT scan of the abdomen and pelvis was performed following injection of IV contrast. Multiplanar reformats were obtained. Dose reduction techniques were used.  CONTRAST: 106 mL Isovue-370.      FINDINGS:   LOWER CHEST: Small right pleural effusion, new since the previous exam. Mild mosaic attenuation at both lung bases is also new since the previous exam, and may be related to air trapping. Sternotomy.    HEPATOBILIARY: There is diffuse fatty infiltration of the liver. Mild nodularity of the liver contour suggests underlying cirrhosis. Prior cholecystectomy. No hepatic masses are identified. Small amount of ascites.    PANCREAS: Normal.    SPLEEN: Mild splenomegaly has increased slightly. The spleen measures 15.5 cm in length.    ADRENAL GLANDS: Normal.    KIDNEYS/BLADDER: No significant mass, stone, or hydronephrosis.    BOWEL: No bowel obstruction. No evidence for colitis or diverticulitis. Unremarkable appendix.    LYMPH NODES: No lymphadenopathy.    VASCULATURE: Moderate atherosclerotic aortoiliac calcification.    PELVIC ORGANS: Unremarkable.    MUSCULOSKELETAL: Unremarkable.      Impression    IMPRESSION:   1.  Diffuse fatty infiltration of the liver.  2.  Nodularity of the liver contour suggests underlying cirrhosis.  3.  Mild splenomegaly has increased since the previous exam, and may be related to portal hypertension.  4.  Mild ascites is new since the previous exam.  5.  Small right pleural effusion is also new since the previous exam.     UA with Microscopic reflex to Culture    Specimen: Urine, Midstream   Result Value Ref Range    Color Urine Light Yellow Colorless, Straw, Light Yellow, Yellow    Appearance Urine Clear Clear    Glucose Urine 300  (A) Negative mg/dL    Bilirubin Urine Negative Negative    Ketones Urine Negative Negative mg/dL    Specific Gravity Urine 1.023 1.003 - 1.035    Blood Urine Negative Negative    pH Urine 6.0 5.0 - 7.0    Protein Albumin Urine Negative Negative mg/dL    Urobilinogen Urine 2.0 Normal, 2.0 mg/dL    Nitrite Urine Negative Negative    Leukocyte Esterase Urine Negative Negative    RBC Urine <1 <=2 /HPF    WBC Urine 1 <=5 /HPF    Narrative    Urine  Culture not indicated   Glucose by meter   Result Value Ref Range    GLUCOSE BY METER POCT 72 70 - 99 mg/dL   Potassium   Result Value Ref Range    Potassium 4.0 3.4 - 5.3 mmol/L   Glucose by meter   Result Value Ref Range    GLUCOSE BY METER POCT 169 (H) 70 - 99 mg/dL   Glucose by meter   Result Value Ref Range    GLUCOSE BY METER POCT 67 (L) 70 - 99 mg/dL   Glucose by meter   Result Value Ref Range    GLUCOSE BY METER POCT 79 70 - 99 mg/dL   Glucose by meter   Result Value Ref Range    GLUCOSE BY METER POCT 113 (H) 70 - 99 mg/dL   Basic metabolic panel   Result Value Ref Range    Sodium 139 133 - 144 mmol/L    Potassium 3.5 3.4 - 5.3 mmol/L    Chloride 105 94 - 109 mmol/L    Carbon Dioxide (CO2) 29 20 - 32 mmol/L    Anion Gap 5 3 - 14 mmol/L    Urea Nitrogen 21 7 - 30 mg/dL    Creatinine 1.52 (H) 0.66 - 1.25 mg/dL    Calcium 10.2 (H) 8.5 - 10.1 mg/dL    Glucose 113 (H) 70 - 99 mg/dL    GFR Estimate 46 (L) >60 mL/min/1.73m2   EKG 12-lead, tracing only   Result Value Ref Range    Systolic Blood Pressure  mmHg    Diastolic Blood Pressure  mmHg    Ventricular Rate 60 BPM    Atrial Rate 102 BPM    SD Interval  ms    QRS Duration 118 ms     ms    QTc 468 ms    P Axis  degrees    R AXIS 251 degrees    T Axis 263 degrees    Interpretation ECG       Atrial fibrillation with a competing junctional pacemaker  Right superior axis deviation  Non-specific intra-ventricular conduction delay  ST & T wave abnormality, consider inferior ischemia  ST & T wave abnormality, consider anterolateral ischemia  Prolonged QT  Abnormal ECG  When compared with ECG of 25-DEC-2021 11:49,  QRS axis Shifted left  Criteria for Inferior infarct are no longer Present     Glucose by meter   Result Value Ref Range    GLUCOSE BY METER POCT 88 70 - 99 mg/dL

## 2021-12-26 NOTE — PROVIDER NOTIFICATION
MD Notification    Notified Person: MD    Notified Person Name: Dr. Cardenas    Notification Date/Time: 12/26/21, 0600    Notification Interaction: [employer]    Purpose of Notification: pauses up to 3.1 seconds, pt asymptomatic, BP stable    Orders Received: routine EKG    Comments:

## 2021-12-26 NOTE — PROVIDER NOTIFICATION
MD Notification    Notified Person: MD    Notified Person Name:     Notification Date/Time: 12- 0825    Notification Interaction: Text message    Purpose of Notification: Creatinine 1.52. Pt. Due for Lasix this AM.     Orders Received:    Comments:

## 2021-12-26 NOTE — PLAN OF CARE
Summary: 1900 - 0730    Cognition/Mentation: A/O x4    VS: bradycardic at times, HR as low as 42, pt asymptomatic, other vitals stable, RA    Cardiac: afib, 3 pauses in the evening, longest lasted 2 seconds; several pauses later in shift around 0545, up to 3.1 seconds, pt asymptomatic, MD updated    Respiratory: LS diminished, PEREZ    GI/: continent, using urinal    Activity: Ind    Pain: C/o leg cramps @ 0100; heat applied & pain resolved; denies pain otherwise    Drips: none    Drains/Tubes: PIV SL    Skin: intact    Other: echo ordered, cards & GI consults     Disposition: pending

## 2021-12-27 ENCOUNTER — APPOINTMENT (OUTPATIENT)
Dept: OCCUPATIONAL THERAPY | Facility: CLINIC | Age: 81
DRG: 291 | End: 2021-12-27
Payer: COMMERCIAL

## 2021-12-27 LAB
ANION GAP SERPL CALCULATED.3IONS-SCNC: 4 MMOL/L (ref 3–14)
BUN SERPL-MCNC: 24 MG/DL (ref 7–30)
CALCIUM SERPL-MCNC: 10.4 MG/DL (ref 8.5–10.1)
CHLORIDE BLD-SCNC: 107 MMOL/L (ref 94–109)
CO2 SERPL-SCNC: 29 MMOL/L (ref 20–32)
CREAT SERPL-MCNC: 1.56 MG/DL (ref 0.66–1.25)
GFR SERPL CREATININE-BSD FRML MDRD: 45 ML/MIN/1.73M2
GLUCOSE BLD-MCNC: 115 MG/DL (ref 70–99)
GLUCOSE BLDC GLUCOMTR-MCNC: 101 MG/DL (ref 70–99)
GLUCOSE BLDC GLUCOMTR-MCNC: 131 MG/DL (ref 70–99)
GLUCOSE BLDC GLUCOMTR-MCNC: 131 MG/DL (ref 70–99)
GLUCOSE BLDC GLUCOMTR-MCNC: 147 MG/DL (ref 70–99)
GLUCOSE BLDC GLUCOMTR-MCNC: 258 MG/DL (ref 70–99)
POTASSIUM BLD-SCNC: 3.8 MMOL/L (ref 3.4–5.3)
SODIUM SERPL-SCNC: 140 MMOL/L (ref 133–144)

## 2021-12-27 PROCEDURE — 250N000011 HC RX IP 250 OP 636: Performed by: NURSE PRACTITIONER

## 2021-12-27 PROCEDURE — 97530 THERAPEUTIC ACTIVITIES: CPT | Mod: GO | Performed by: OCCUPATIONAL THERAPIST

## 2021-12-27 PROCEDURE — 250N000013 HC RX MED GY IP 250 OP 250 PS 637: Performed by: STUDENT IN AN ORGANIZED HEALTH CARE EDUCATION/TRAINING PROGRAM

## 2021-12-27 PROCEDURE — 250N000011 HC RX IP 250 OP 636: Performed by: INTERNAL MEDICINE

## 2021-12-27 PROCEDURE — 36415 COLL VENOUS BLD VENIPUNCTURE: CPT | Performed by: STUDENT IN AN ORGANIZED HEALTH CARE EDUCATION/TRAINING PROGRAM

## 2021-12-27 PROCEDURE — 210N000002 HC R&B HEART CARE

## 2021-12-27 PROCEDURE — 97535 SELF CARE MNGMENT TRAINING: CPT | Mod: GO | Performed by: OCCUPATIONAL THERAPIST

## 2021-12-27 PROCEDURE — 80048 BASIC METABOLIC PNL TOTAL CA: CPT | Performed by: STUDENT IN AN ORGANIZED HEALTH CARE EDUCATION/TRAINING PROGRAM

## 2021-12-27 PROCEDURE — 99233 SBSQ HOSP IP/OBS HIGH 50: CPT | Performed by: NURSE PRACTITIONER

## 2021-12-27 PROCEDURE — 99232 SBSQ HOSP IP/OBS MODERATE 35: CPT | Performed by: STUDENT IN AN ORGANIZED HEALTH CARE EDUCATION/TRAINING PROGRAM

## 2021-12-27 RX ORDER — POTASSIUM CHLORIDE 1500 MG/1
20 TABLET, EXTENDED RELEASE ORAL ONCE
Status: COMPLETED | OUTPATIENT
Start: 2021-12-27 | End: 2021-12-27

## 2021-12-27 RX ORDER — FUROSEMIDE 10 MG/ML
40 INJECTION INTRAMUSCULAR; INTRAVENOUS 2 TIMES DAILY
Status: DISCONTINUED | OUTPATIENT
Start: 2021-12-27 | End: 2021-12-28

## 2021-12-27 RX ORDER — METOPROLOL TARTRATE 25 MG/1
25 TABLET, FILM COATED ORAL 2 TIMES DAILY
Status: DISCONTINUED | OUTPATIENT
Start: 2021-12-27 | End: 2021-12-28

## 2021-12-27 RX ORDER — FUROSEMIDE 10 MG/ML
40 INJECTION INTRAMUSCULAR; INTRAVENOUS ONCE
Status: DISCONTINUED | OUTPATIENT
Start: 2021-12-27 | End: 2021-12-27

## 2021-12-27 RX ADMIN — FUROSEMIDE 20 MG: 10 INJECTION, SOLUTION INTRAVENOUS at 08:47

## 2021-12-27 RX ADMIN — METOPROLOL TARTRATE 25 MG: 25 TABLET, FILM COATED ORAL at 21:20

## 2021-12-27 RX ADMIN — LEVOTHYROXINE SODIUM 100 MCG: 100 TABLET ORAL at 06:57

## 2021-12-27 RX ADMIN — ATORVASTATIN CALCIUM 40 MG: 40 TABLET, FILM COATED ORAL at 08:47

## 2021-12-27 RX ADMIN — FUROSEMIDE 40 MG: 10 INJECTION, SOLUTION INTRAVENOUS at 21:19

## 2021-12-27 RX ADMIN — AZELASTINE HYDROCHLORIDE 2 SPRAY: 137 SPRAY, METERED NASAL at 08:46

## 2021-12-27 RX ADMIN — INSULIN ASPART 9 UNITS: 100 INJECTION, SOLUTION INTRAVENOUS; SUBCUTANEOUS at 12:17

## 2021-12-27 RX ADMIN — INSULIN ASPART 9 UNITS: 100 INJECTION, SOLUTION INTRAVENOUS; SUBCUTANEOUS at 08:46

## 2021-12-27 RX ADMIN — FENOFIBRATE 54 MG: 54 TABLET ORAL at 08:47

## 2021-12-27 RX ADMIN — APIXABAN 5 MG: 5 TABLET, FILM COATED ORAL at 08:47

## 2021-12-27 RX ADMIN — APIXABAN 5 MG: 5 TABLET, FILM COATED ORAL at 21:20

## 2021-12-27 RX ADMIN — FUROSEMIDE 40 MG: 10 INJECTION, SOLUTION INTRAVENOUS at 12:17

## 2021-12-27 RX ADMIN — AZELASTINE HYDROCHLORIDE 2 SPRAY: 137 SPRAY, METERED NASAL at 21:21

## 2021-12-27 RX ADMIN — INSULIN ASPART: 100 INJECTION, SOLUTION INTRAVENOUS; SUBCUTANEOUS at 17:17

## 2021-12-27 RX ADMIN — POTASSIUM CHLORIDE 20 MEQ: 1500 TABLET, EXTENDED RELEASE ORAL at 15:45

## 2021-12-27 RX ADMIN — INSULIN GLARGINE 68 UNITS: 100 INJECTION, SOLUTION SUBCUTANEOUS at 21:28

## 2021-12-27 RX ADMIN — METOPROLOL TARTRATE 25 MG: 25 TABLET, FILM COATED ORAL at 12:18

## 2021-12-27 ASSESSMENT — ACTIVITIES OF DAILY LIVING (ADL)
ADLS_ACUITY_SCORE: 8
ADLS_ACUITY_SCORE: 8
ADLS_ACUITY_SCORE: 10
ADLS_ACUITY_SCORE: 8
ADLS_ACUITY_SCORE: 10
ADLS_ACUITY_SCORE: 10
ADLS_ACUITY_SCORE: 8
ADLS_ACUITY_SCORE: 10
ADLS_ACUITY_SCORE: 8
ADLS_ACUITY_SCORE: 8
ADLS_ACUITY_SCORE: 10
ADLS_ACUITY_SCORE: 8
ADLS_ACUITY_SCORE: 8
ADLS_ACUITY_SCORE: 10
ADLS_ACUITY_SCORE: 10
ADLS_ACUITY_SCORE: 8
ADLS_ACUITY_SCORE: 10
ADLS_ACUITY_SCORE: 8
ADLS_ACUITY_SCORE: 8
ADLS_ACUITY_SCORE: 10

## 2021-12-27 ASSESSMENT — MIFFLIN-ST. JEOR: SCORE: 1682.87

## 2021-12-27 NOTE — PROGRESS NOTES
[facility]    Hospitalist Progress Note    Date of Service (when I saw the patient): 12/27/2021    Assessment & Plan   Pascual Perea is a pleasant 81 year old gentleman admitted 12/25/2021 for evaluation of progressive SOB.  Current problems include:     Probable acute HF with pEF  Severe Pulmonary HTN  Cor Pulmonale  Presented with progressive PEREZ, and possible mild orthopnea.  BNP, JVP elevated, peripheral edema. Was on lasix daily until follow up with PCP in September when it was stopped due to hypercalcemia.   EF >70% on MPI stress test and exercise stress test  - appreciate Cardiology recs  - IV Lasix 40 mg x BID -> Transition to PO torsemide 10 mg tomorrow dependant on renal function and diuresis  - continue metoprolol  - I&O's  - daily weights    Chronic kidney disease, with mild rise in Cr this a.m. to 1.56  Plan:  - repeat BMP 12/28     Chronic A-fib; rates stable.  - previously on amiodarone, but was stopped due to liver enzyme elevation  - followed by Joleen Marrero PA-C and Dr. Jarvis  - continue metoprolol  - continue apixaban    Sinus pauses overnight, longest 3.1 sec.  No new Sx.  No recurrence today.  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  - reviewed care w/ GI team/Dr. Erik Baca earlier today - appreciate his input.  Per Dr. Baca, [facility] will need:  - weight loss and strict avoidance of fructose and other refined carbs (encouraged)  - low Na diet  - should be followed as an outpt in [facility] liver clinic for HCC screening and at some point, variceal screening.  [facility] to arrange this appointment.     Hypercalcemia, mild  Per prior note: has diagnosis of hyperparathyroidism in chart, but PTH and vit D are not elevated. Phos was low when last checked in 2019. Further w/u with PSA, SPEP, etc seem to be negative as well. Lasix was previously stopped due to   -  monitor     CAD s/p single vessel CABG in 90s  Dyslipidemia  - continue apixaban  - continue fenofibrate, atorvastatin     DM Type 2, well controlled, on insulin.   - continue PTA glargine 68 and aspart 3u/15g CHO with MDSSI     Diet:   diabetic  DVT Prophylaxis: DOAC  Duran Catheter: Not present  Central Lines: None  Code Status:   DNR/DNI, discussed          Expected Discharge: tomorrow  Anticipated discharge location:  Awaiting care coordination yordy HENDRICKSON MD  [facility] Hospitalist  Text Page (7am - 6pm)    Interval History      Reviewed care w/ RN today.  No new Sx.  Continues to have less SOB. No CP. Abdominal distension better. No fever or chills.     Data reviewed today: I reviewed all new labs and imaging results over the last 24 hours.     Physical Exam   Temp: 97.8  F (36.6  C) Temp src: Oral BP: (!) 151/74 Pulse: 65   Resp: 18 SpO2: 95 % O2 Device: None (Room air)    Vitals:    12/25/21 1655 12/26/21 0557 12/27/21 0650   Weight: 98 kg (216 lb 1.6 oz) 96.1 kg (211 lb 12.8 oz) 95.1 kg (209 lb 9.6 oz)     Vital Signs with Ranges  Temp:  [97.8  F (36.6  C)-98.5  F (36.9  C)] 97.8  F (36.6  C)  Pulse:  [55-65] 65  Resp:  [18-20] 18  BP: (116-151)/(57-74) 151/74  SpO2:  [95 %-96 %] 95 %  I/O last 3 completed shifts:  In: 1860 [P.O.:1860]  Out: 3000 [Urine:3000]    Constitutional: awake, no apparent distress; lying in bed  HEENT: sclerae clear; MM's moist  Respiratory: good a/e bilaterally, no wheezing, but faint rhonchi bilateral bases  Cardiovascular: Irregular rate and rhythm, S1, S2 noted; no m/r/g  GI: abdomen flat, + bowel sounds; soft, non-tender; mildly distended  Skin/Integumen: no rashes, no cyanosis, no jaundice  Musculoskeletal: trace edema, lower legs  Neurologic: follows directions well; no focal deficits      Medications     - MEDICATION INSTRUCTIONS -       - MEDICATION INSTRUCTIONS -       ACE/ARB/ARNI NOT PRESCRIBED         apixaban ANTICOAGULANT  5 mg Oral BID      atorvastatin  40 mg Oral Daily     azelastine  2 spray Both Nostrils BID     fenofibrate  54 mg Oral QAM AC     furosemide  40 mg Intravenous BID     insulin aspart   Subcutaneous TID w/meals     insulin aspart  1-7 Units Subcutaneous TID AC     insulin aspart  1-5 Units Subcutaneous At Bedtime     insulin glargine  68 Units Subcutaneous At Bedtime     levothyroxine  100 mcg Oral QAM AC     metoprolol tartrate  25 mg Oral BID     sodium chloride (PF)  3 mL Intracatheter Q8H       Data   Recent Labs   Lab 12/27/21  1122 12/27/21  0725 12/27/21  0632 12/26/21  0755 12/26/21  0553 12/25/21  2112 12/25/21  1800 12/25/21  1726 12/25/21  1145   WBC  --   --   --   --   --   --   --   --  5.5   HGB  --   --   --   --   --   --   --   --  11.0*   MCV  --   --   --   --   --   --   --   --  88   PLT  --   --   --   --   --   --   --   --  224   NA  --   --  140  --  139  --   --   --  139   POTASSIUM  --   --  3.8  --  3.5  --  4.0  --  4.1   CHLORIDE  --   --  107  --  105  --   --   --  107   CO2  --   --  29  --  29  --   --   --  28   BUN  --   --  24  --  21  --   --   --  23   CR  --   --  1.56*  --  1.52*  --   --   --  1.43*   ANIONGAP  --   --  4  --  5  --   --   --  4   STARR  --   --  10.4*  --  10.2*  --   --   --  10.3*   * 101* 115*   < > 113*   < >  --    < > 164*   ALBUMIN  --   --   --   --   --   --   --   --  3.8   PROTTOTAL  --   --   --   --   --   --   --   --  7.1   BILITOTAL  --   --   --   --   --   --   --   --  0.7   ALKPHOS  --   --   --   --   --   --   --   --  93   ALT  --   --   --   --   --   --   --   --  77*   AST  --   --   --   --   --   --   --   --  43    < > = values in this interval not displayed.       No results found for this or any previous visit (from the past 24 hour(s)).

## 2021-12-27 NOTE — CONSULTS
[facility]    Received [facility] Consult from Dr. Burgos.     Reviewed Mr. Perea's chart and note they are admitted for shortness of breath in setting of diuretic being stopped in the outpatient setting. Echocardiogram shows LVEF 60-65%. This is their first admission in the past year for concerns of heart failure.     Given above information, Mr. Canales meets criteria for general cardiology follow up.     Will defer outpatient cardiology arrangements to inpatient team.    Future Appointments   Date Time Provider Department Center   1/14/2022  3:30 PM Andrew Elizalde MD [facility]          Please call with questions.          Luzma Hansen RN BSN  [facility] RN Care Coordinator   [facility]   [phone]   [facility]: Cardiomyopathy, Optimization, Rehabilitation, Education

## 2021-12-27 NOTE — PLAN OF CARE
Pt is A&Ox4, [place], VSS and on tele A-Fib CVR, on RA and LS bases diminished, up independently in room,  at HS, on Lasix 20 mg IV BID, plan to continue to monitor.

## 2021-12-27 NOTE — PLAN OF CARE
VSS. Tele Afib with slow ventricular rate, few episodes of HR in 30's. Pt asymptomatic. A&O x4. Denies pain. Slept well between cares. Up independently in room. Plan to continue diuresis and monitor HR.

## 2021-12-27 NOTE — PROGRESS NOTES
[facility]    Hospitalist Progress Note    Date of Service (when I saw the patient): 12/26/2021    Assessment & Plan   Pascual Perea is a pleasant 81 year old gentleman admitted 12/25/2021 for evaluation of progressive SOB.  Current problems include:     Probable acute HF with pEF  Severe Pulmonary HTN  Cor Pulmonale  Presented with progressive PEREZ, and possible mild orthopnea.  BNP, JVP elevated, peripheral edema. Was on lasix daily until follow up with PCP in September when it was stopped due to hypercalcemia.   EF >70% on MPI stress test and exercise stress test  - appreciate Cardiology consult today; anticipate follow up by Dr. Brantley 12/27  - lasix BID; decreased to 20 mg BID given rise in Cr  - continue metoprolol  - I&O's  - daily weights    Chronic kidney disease, with mild rise in Cr this a.m.  - decrease lasix to 20 mg IV BID  - repeat BMP 12/27     Chronic A-fib; rates stable.  - previously on amiodarone, but was stopped due to liver enzyme elevation  - followed by Joleen Marrero PA-C and Dr. Jarvis  - continue metoprolol  - continue apixaban    Sinus pauses overnight, longest 3.1 sec.  No new Sx.  No recurrence today.  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  - reviewed care w/ GI team/Dr. Erik Baca earlier today - appreciate his input.  Per Dr. Baca, [facility] will need:  - weight loss and strict avoidance of fructose and other refined carbs (encouraged)  - low Na diet  - should be followed as an outpt in [facility] liver clinic for HCC screening and at some point, variceal screening.  [facility] to arrange this appointment.     Hypercalcemia, mild  Per prior note: has diagnosis of hyperparathyroidism in chart, but PTH and vit D are not elevated. Phos was low when last checked in 2019. Further w/u with PSA, SPEP, etc seem to be negative as well. Lasix was previously  stopped due to   - monitor     CAD s/p single vessel CABG in 90s  Dyslipidemia  - continue apixaban  - continue fenofibrate, atorvastatin     DM Type 2, well controlled, on insulin.   - continue PTA glargine 68 and aspart 3u/15g CHO with MDSSI     Diet:   diabetic  DVT Prophylaxis: DOAC  Duran Catheter: Not present  Central Lines: None  Code Status:   DNR/DNI, discussed          Expected Discharge:  2-3 days  Anticipated discharge location:  Awaiting care coordination yordy Plummer MD, FACP     Internal Medicine Hospitalist  Text Page (7am - 6pm)    Interval History   Reviewed care w/ RN earlier today.  No new Sx.  Possibly less SOB, but not OOB much yet.    Data reviewed today: I reviewed all new labs and imaging results over the last 24 hours.     Physical Exam   Temp: 99  F (37.2  C) Temp src: Oral BP: 132/55 Pulse: 63   Resp: 20 SpO2: 96 % O2 Device: None (Room air)    Vitals:    12/25/21 1655 12/26/21 0557   Weight: 98 kg (216 lb 1.6 oz) 96.1 kg (211 lb 12.8 oz)     Vital Signs with Ranges  Temp:  [97.7  F (36.5  C)-99  F (37.2  C)] 99  F (37.2  C)  Pulse:  [63-66] 63  Resp:  [18-20] 20  BP: (111-141)/(45-77) 132/55  SpO2:  [94 %-96 %] 96 %  I/O last 3 completed shifts:  In: 1980 [P.O.:1980]  Out: 4050 [Urine:4050]    Constitutional: awake, no apparent distress; lying in bed  HEENT: sclerae clear; MM's moist  Respiratory: good a/e bilaterally, no wheezing, but faint rhonchi bilateral bases  Cardiovascular: Irregular rate and rhythm, S1, S2 noted; no m/r/g  GI: abdomen flat, + bowel sounds; soft, non-tender; mildly distended  Skin/Integumen: no rashes, no cyanosis, no jaundice  Musculoskeletal: trace edema, lower legs  Neurologic: follows directions well; no focal deficits      Medications     - MEDICATION INSTRUCTIONS -       - MEDICATION INSTRUCTIONS -       ACE/ARB/ARNI NOT PRESCRIBED         apixaban ANTICOAGULANT  5 mg Oral BID     atorvastatin  40 mg Oral Daily     azelastine  2 spray Both  Nostrils BID     fenofibrate  54 mg Oral QAM AC     furosemide  20 mg Intravenous BID     insulin aspart   Subcutaneous TID w/meals     insulin aspart  1-7 Units Subcutaneous TID AC     insulin aspart  1-5 Units Subcutaneous At Bedtime     insulin glargine  68 Units Subcutaneous At Bedtime     levothyroxine  100 mcg Oral QAM AC     [Held by provider] metoprolol tartrate  25 mg Oral BID     sodium chloride (PF)  3 mL Intracatheter Q8H       Data   Recent Labs   Lab 21  1726 21  1242 21  0755 21  0553 21  2112 21  1800 21  1726 21  1145   WBC  --   --   --   --   --   --   --  5.5   HGB  --   --   --   --   --   --   --  11.0*   MCV  --   --   --   --   --   --   --  88   PLT  --   --   --   --   --   --   --  224   NA  --   --   --  139  --   --   --  139   POTASSIUM  --   --   --  3.5  --  4.0  --  4.1   CHLORIDE  --   --   --  105  --   --   --  107   CO2  --   --   --  29  --   --   --  28   BUN  --   --   --  21  --   --   --  23   CR  --   --   --  1.52*  --   --   --  1.43*   ANIONGAP  --   --   --  5  --   --   --  4   STARR  --   --   --  10.2*  --   --   --  10.3*   * 116* 88 113*   < >  --    < > 164*   ALBUMIN  --   --   --   --   --   --   --  3.8   PROTTOTAL  --   --   --   --   --   --   --  7.1   BILITOTAL  --   --   --   --   --   --   --  0.7   ALKPHOS  --   --   --   --   --   --   --  93   ALT  --   --   --   --   --   --   --  77*   AST  --   --   --   --   --   --   --  43    < > = values in this interval not displayed.       Recent Results (from the past 24 hour(s))   Echocardiogram Complete   Result Value    LVEF  60-65%    Narrative    [id number]  [id number]  [id number]  [id number]^SRINIVAS^ELIZABETH^LESTER     [facility]  Echocardiography Laboratory  [address] [place], [place] [id number]     Name: JESÚS VASQUEZ  MRN: [id number]  : 1940  Study Date: 2021 11:59 AM  Age: 81 yrs  Gender: Male  Patient Location:  [facility]  Reason For Study: Heart Failure  Ordering Physician: ELIZABETH ESPINO  Referring Physician: Andrew Elizalde  Performed By: Wilder Rodriguez     BSA: 1.9 m2  Height: 64 in  Weight: 183 lb  HR: 52  BP: 126/79 mmHg  ______________________________________________________________________________  Procedure  Complete Portable Echo Adult.  ______________________________________________________________________________  Interpretation Summary     1. Left ventricular systolic function is normal. The visual ejection fraction  is 60-65%.  2. Flattened septum is consistent with RV pressure/volume overload.  3. The right ventricle is severely dilated. Moderately decreased right  ventricular systolic function.  4. There is severe biatrial dilatation.  5. Mild aortic stenosis; mean gradient 15 mmHg, peak velocity 2.6 m/sec,  calculated valve area 1.4 cm2.  6. Severe pulmnary hypertension; The right ventricular systolic pressure is  approximated at 70mmHg plus the right atrial pressure. IVC diameter >2.1 cm  collapsing <50% with sniff suggests a high RA pressure estimated at 15 mmHg or  greater.  7. In comparison with the prior studies, severe pulmonary hypertension is  present with evidence for cor pulmonale.  ______________________________________________________________________________  Left Ventricle  The left ventricle is normal in size. There is normal left ventricular wall  thickness. Left ventricular systolic function is normal. The visual ejection  fraction is 60-65%. Diastolic function not assessed due to atrial  fibrillation. Flattened septum is consistent with RV pressure/volume overload.     Right Ventricle  The right ventricle is severely dilated. Moderately decreased right  ventricular systolic function.     Atria  The left atrium is severely dilated. The right atrium is severely dilated.  There is no color Doppler evidence of an atrial shunt.     Mitral Valve  There is mild mitral annular calcification. There is  trace mitral  regurgitation.     Tricuspid Valve  The tricuspid valve is normal in structure and function. There is mild (1+)  tricuspid regurgitation. The right ventricular systolic pressure is  approximated at 70mmHg plus the right atrial pressure.     Aortic Valve  Mild valvular aortic stenosis. Mild aortic stenosis; mean gradient 15 mmHg,  peak velocity 2.6 m/sec, calculated valve area 1.4 cm2.     Pulmonic Valve  The pulmonic valve is normal in structure and function. There is mild (1+)  pulmonic valvular regurgitation.     Vessels  Normal size aorta. IVC diameter >2.1 cm collapsing <50% with sniff suggests a  high RA pressure estimated at 15 mmHg or greater.     Pericardium  There is no pericardial effusion.     Rhythm  The rhythm was atrial fibrillation.  ______________________________________________________________________________  MMode/2D Measurements & Calculations  IVSd: 1.2 cm     LVIDd: 4.4 cm  LVIDs: 3.1 cm  LVPWd: 1.2 cm  FS: 30.4 %  LV mass(C)d: 185.2 grams  LV mass(C)dI: 98.3 grams/m2  Ao root diam: 3.5 cm  LA dimension: 4.5 cm  asc Aorta Diam: 3.3 cm  LA/Ao: 1.3  LVOT diam: 2.0 cm  LVOT area: 3.1 cm2  LA Volume (BP): 96.8 ml  LA Volume Index (BP): 51.5 ml/m2  RWT: 0.54     Doppler Measurements & Calculations  MV E max aurelio: 84.9 cm/sec  MV dec slope: 535.4 cm/sec2  Ao V2 max: 256.5 cm/sec  Ao max P.0 mmHg  Ao V2 mean: 177.8 cm/sec  Ao mean P.7 mmHg  Ao V2 VTI: 53.8 cm  AHSAN(I,D): 1.4 cm2  AHSAN(V,D): 1.4 cm2  LV V1 max P.6 mmHg  LV V1 max: 118.4 cm/sec  LV V1 VTI: 23.2 cm  SV(LVOT): 73.0 ml  SI(LVOT): 38.8 ml/m2  PA acc time: 0.08 sec  TR max aurelio: 416.7 cm/sec  TR max P.5 mmHg  AV Aurelio Ratio (DI): 0.46  AHSAN Index (cm2/m2): 0.72  E/E' av.6  Lateral E/e': 9.9  Medial E/e': 13.2     ______________________________________________________________________________  Report approved by: Liliam Maradiaga 2021 01:26 PM

## 2021-12-27 NOTE — PLAN OF CARE
Neuro:intact  CV/Rhythm:a fib  Resp/02:ra  GI/Diet:mod carb  :voiding on IV lasix  Skin/Incisions/Sites:intact  Pulses/CMS:intact  Edema:BLE 1-2+  Activity/Falls Risk:up independently  Lines/Drains/IVs:PIV  Labs/BGM:k recheck in am 3.56  Test/Procedures:none  VS/Pain:stable, denies pain  DC Plan:once diuresed  Other:social work core ot

## 2021-12-28 VITALS
RESPIRATION RATE: 20 BRPM | HEIGHT: 71 IN | WEIGHT: 203.4 LBS | HEART RATE: 54 BPM | TEMPERATURE: 98.1 F | BODY MASS INDEX: 28.48 KG/M2 | OXYGEN SATURATION: 93 % | SYSTOLIC BLOOD PRESSURE: 143 MMHG | DIASTOLIC BLOOD PRESSURE: 59 MMHG

## 2021-12-28 LAB
ANION GAP SERPL CALCULATED.3IONS-SCNC: 6 MMOL/L (ref 3–14)
BUN SERPL-MCNC: 31 MG/DL (ref 7–30)
CALCIUM SERPL-MCNC: 10.9 MG/DL (ref 8.5–10.1)
CHLORIDE BLD-SCNC: 102 MMOL/L (ref 94–109)
CO2 SERPL-SCNC: 29 MMOL/L (ref 20–32)
CREAT SERPL-MCNC: 1.69 MG/DL (ref 0.66–1.25)
ERYTHROCYTE [DISTWIDTH] IN BLOOD BY AUTOMATED COUNT: 14.1 % (ref 10–15)
GFR SERPL CREATININE-BSD FRML MDRD: 41 ML/MIN/1.73M2
GLUCOSE BLD-MCNC: 184 MG/DL (ref 70–99)
GLUCOSE BLDC GLUCOMTR-MCNC: 171 MG/DL (ref 70–99)
GLUCOSE BLDC GLUCOMTR-MCNC: 198 MG/DL (ref 70–99)
GLUCOSE BLDC GLUCOMTR-MCNC: 198 MG/DL (ref 70–99)
HCT VFR BLD AUTO: 39.7 % (ref 40–53)
HGB BLD-MCNC: 12.7 G/DL (ref 13.3–17.7)
MCH RBC QN AUTO: 27 PG (ref 26.5–33)
MCHC RBC AUTO-ENTMCNC: 32 G/DL (ref 31.5–36.5)
MCV RBC AUTO: 84 FL (ref 78–100)
PLATELET # BLD AUTO: 251 10E3/UL (ref 150–450)
POTASSIUM BLD-SCNC: 3.9 MMOL/L (ref 3.4–5.3)
RBC # BLD AUTO: 4.71 10E6/UL (ref 4.4–5.9)
SODIUM SERPL-SCNC: 137 MMOL/L (ref 133–144)
WBC # BLD AUTO: 6 10E3/UL (ref 4–11)

## 2021-12-28 PROCEDURE — 99239 HOSP IP/OBS DSCHRG MGMT >30: CPT | Performed by: STUDENT IN AN ORGANIZED HEALTH CARE EDUCATION/TRAINING PROGRAM

## 2021-12-28 PROCEDURE — 80048 BASIC METABOLIC PNL TOTAL CA: CPT | Performed by: STUDENT IN AN ORGANIZED HEALTH CARE EDUCATION/TRAINING PROGRAM

## 2021-12-28 PROCEDURE — 85027 COMPLETE CBC AUTOMATED: CPT | Performed by: STUDENT IN AN ORGANIZED HEALTH CARE EDUCATION/TRAINING PROGRAM

## 2021-12-28 PROCEDURE — 36415 COLL VENOUS BLD VENIPUNCTURE: CPT | Performed by: STUDENT IN AN ORGANIZED HEALTH CARE EDUCATION/TRAINING PROGRAM

## 2021-12-28 PROCEDURE — 99232 SBSQ HOSP IP/OBS MODERATE 35: CPT | Performed by: INTERNAL MEDICINE

## 2021-12-28 PROCEDURE — 250N000013 HC RX MED GY IP 250 OP 250 PS 637: Performed by: STUDENT IN AN ORGANIZED HEALTH CARE EDUCATION/TRAINING PROGRAM

## 2021-12-28 RX ORDER — TORSEMIDE 10 MG/1
10 TABLET ORAL DAILY
Status: DISCONTINUED | OUTPATIENT
Start: 2021-12-30 | End: 2021-12-28 | Stop reason: HOSPADM

## 2021-12-28 RX ORDER — TORSEMIDE 10 MG/1
10 TABLET ORAL DAILY
Qty: 30 TABLET | Refills: 0 | Status: SHIPPED | OUTPATIENT
Start: 2021-12-30 | End: 2022-01-10

## 2021-12-28 RX ADMIN — AZELASTINE HYDROCHLORIDE 2 SPRAY: 137 SPRAY, METERED NASAL at 08:09

## 2021-12-28 RX ADMIN — INSULIN ASPART 12 UNITS: 100 INJECTION, SOLUTION INTRAVENOUS; SUBCUTANEOUS at 11:51

## 2021-12-28 RX ADMIN — APIXABAN 5 MG: 5 TABLET, FILM COATED ORAL at 08:10

## 2021-12-28 RX ADMIN — INSULIN ASPART 12 UNITS: 100 INJECTION, SOLUTION INTRAVENOUS; SUBCUTANEOUS at 07:55

## 2021-12-28 RX ADMIN — LEVOTHYROXINE SODIUM 100 MCG: 100 TABLET ORAL at 06:20

## 2021-12-28 RX ADMIN — FENOFIBRATE 54 MG: 54 TABLET ORAL at 06:20

## 2021-12-28 RX ADMIN — ATORVASTATIN CALCIUM 40 MG: 40 TABLET, FILM COATED ORAL at 08:10

## 2021-12-28 ASSESSMENT — ACTIVITIES OF DAILY LIVING (ADL)
ADLS_ACUITY_SCORE: 10

## 2021-12-28 ASSESSMENT — MIFFLIN-ST. JEOR: SCORE: 1654.75

## 2021-12-28 NOTE — DISCHARGE SUMMARY
[facility]  Hospitalist Discharge Summary      Date of Admission:  12/25/2021  Date of Discharge:  12/28/2021  Discharging Provider: Kolton Christensen MD      Discharge Diagnoses     Acute on Chronic HFpEF and RV dysfunction       Follow-ups Needed After Discharge   Follow-up Appointments     Follow-up and recommended labs and tests       Follow up with primary care provider, Andrew Elizalde, within 7 days for   hospital follow- up.  The following labs/tests are recommended: BMP /   LFTs.             Unresulted Labs Ordered in the Past 30 Days of this Admission     No orders found from 11/25/2021 to 12/26/2021.        Discharge Disposition     Discharged to home  Condition at discharge: Stable    Hospital Course   Pascual Perea is a pleasant 81 year old gentleman admitted 12/25/2021 for evaluation of progressive SOB.  Current problems include:     Probable acute HF with pEF  Severe Pulmonary HTN  Cor Pulmonale  Presented with progressive PEREZ, and possible mild orthopnea.  BNP, JVP elevated, peripheral edema. Was on lasix daily until follow up with PCP in September when it was stopped due to hypercalcemia.   EF >70% on MPI stress test and exercise stress test  - appreciate Cardiology recs  - IV Lasix 40 mg x BID -> Transition to PO torsemide 10 mg tomorrow dependant on renal function and diuresis    Chronic kidney disease, with mild rise in Cr this a.m. to 1.56  Plan:  - repeat BMP 12/28     Chronic A-fib; rates stable.  - previously on amiodarone, but was stopped due to liver enzyme elevation  - followed by Joleen Marrero PA-C and Dr. Jarvis  - stopped metoprolol due to bradycardia in 30s intermittently  - continue apixaban    Sinus pauses overnight, longest 3.1 sec.  No new Sx.  No recurrence today.  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  - reviewed care w/ GI team/Dr. Erik Baca earlier today - appreciate his input.  Per Dr. Baca,  Dayday will need:  - weight loss and strict avoidance of fructose and other refined carbs (encouraged)  - low Na diet  - should be followed as an outpt in [facility] liver clinic for HCC screening and at some point, variceal screening.  [facility] to arrange this appointment.     Hypercalcemia, mild  Per prior note: has diagnosis of hyperparathyroidism in chart, but PTH and vit D are not elevated. Phos was low when last checked in 2019. Further w/u with PSA, SPEP, etc seem to be negative as well. Lasix was previously stopped due to      CAD s/p single vessel CABG in 90s  Dyslipidemia  - continue apixaban, reduced to 2.5 mg BID due to Cr  - continue fenofibrate, atorvastatin     DM Type 2, well controlled, on insulin.   - continue PTA glargine 68 and aspart 3u/15g CHO with MDSSI    Consultations This Hospital Stay   CORE CLINIC EVALUATION IP CONSULT  OCCUPATIONAL THERAPY ADULT IP CONSULT  NUTRITION SERVICES ADULT IP CONSULT  CARE MANAGEMENT / SOCIAL WORK IP CONSULT  CARDIOLOGY IP CONSULT  GASTROENTEROLOGY IP CONSULT    Code Status   No CPR- Do NOT Intubate    Time Spent on this Encounter   I, Kolton Christensen MD, personally saw the patient today and spent greater than 30 minutes discharging this patient.       Kolton Christensen MD  [facility]ART [facility]ARE  [address], [address]  [place] [address]  Phone: [phone]  ______________________________________________________________________    Physical Exam   Vital Signs: Temp: 98.1  F (36.7  C) Temp src: Oral BP: (!) 143/59 Pulse: 54   Resp: 20 SpO2: 93 % O2 Device: None (Room air)    Weight: 203 lbs 6.4 oz       Primary Care Physician   Andrew Elizalde    Discharge Orders      Basic metabolic panel     Discharge Order: F/U with Cardiac  KOJO      Follow-up and recommended labs and tests     Follow up with primary care provider, Andrew Elizalde, within 7 days for hospital follow- up.  The following labs/tests are recommended: BMP / LFTs.     Activity    Your activity  upon discharge: activity as tolerated     Reason for your hospital stay    You had shortness of breath from fluid overload and heart failure.     Diet    Follow this diet upon discharge: Orders Placed This Encounter      Moderate Consistent Carb (60 g CHO per Meal) Diet      Low Sodium < 2 gm daily      Low fat diet       Significant Results and Procedures   Most Recent 3 CBC's:Recent Labs   Lab Test 12/28/21  0616 12/25/21  1145 04/13/20  1450   WBC 6.0 5.5 4.3   HGB 12.7* 11.0* 13.4   MCV 84 88 87    224 167     Most Recent 3 BMP's:Recent Labs   Lab Test 12/28/21  1126 12/28/21  0724 12/28/21  0616 12/27/21  0725 12/27/21  0632 12/26/21  0755 12/26/21  0553   NA  --   --  137  --  140  --  139   POTASSIUM  --   --  3.9  --  3.8  --  3.5   CHLORIDE  --   --  102  --  107  --  105   CO2  --   --  29  --  29  --  29   BUN  --   --  31*  --  24  --  21   CR  --   --  1.69*  --  1.56*  --  1.52*   ANIONGAP  --   --  6  --  4  --  5   STARR  --   --  10.9*  --  10.4*  --  10.2*   * 171* 184*   < > 115*   < > 113*    < > = values in this interval not displayed.     Most Recent 2 LFT's:Recent Labs   Lab Test 12/25/21  1145 06/10/21  0858   AST 43 27   ALT 77* 37   ALKPHOS 93 90   BILITOTAL 0.7 0.6     Most Recent 3 INR's:Recent Labs   Lab Test 05/22/19  0948 03/17/19  0541 03/14/19  0847   INR 1.43* 1.09 1.37*     Most Recent 3 Troponin's:Recent Labs   Lab Test 06/17/21  1258 02/18/20  0926 01/09/20  0627   TROPI <0.015 <0.015 <0.015     Most Recent 3 BNP's:Recent Labs   Lab Test 12/25/21  1145 01/08/20  1514 04/19/19  0848 04/16/18  1350   NTBNPI 7,439* 128  --  1,793   NTBNP  --   --  985*  --    ,   Results for orders placed or performed during the hospital encounter of 12/25/21   Chest XR,  PA & LAT    Narrative    EXAM: XR CHEST 2 VIEW  LOCATION: [facility]  DATE/TIME: 12/25/2021, 11:52 AM    INDICATION: Dyspnea.  COMPARISON: 09/14/2021.      Impression    IMPRESSION: Mild  cardiac enlargement has increased slightly. Pulmonary vascularity remains within normal limits. Sternotomy and postoperative changes in the mediastinum are again noted. Lungs clear. No pleural effusions. No pneumothorax.     CT Abdomen Pelvis w Contrast    Narrative    EXAM: CT ABDOMEN AND PELVIS WITH CONTRAST  LOCATION: [facility]  DATE/TIME: 12/25/2021, 12:35 PM    INDICATION: Abdominal pain.  COMPARISON: CT of the abdomen and pelvis performed 03/13/2019.  TECHNIQUE: CT scan of the abdomen and pelvis was performed following injection of IV contrast. Multiplanar reformats were obtained. Dose reduction techniques were used.  CONTRAST: 106 mL Isovue-370.     FINDINGS:   LOWER CHEST: Small right pleural effusion, new since the previous exam. Mild mosaic attenuation at both lung bases is also new since the previous exam, and may be related to air trapping. Sternotomy.    HEPATOBILIARY: There is diffuse fatty infiltration of the liver. Mild nodularity of the liver contour suggests underlying cirrhosis. Prior cholecystectomy. No hepatic masses are identified. Small amount of ascites.    PANCREAS: Normal.    SPLEEN: Mild splenomegaly has increased slightly. The spleen measures 15.5 cm in length.    ADRENAL GLANDS: Normal.    KIDNEYS/BLADDER: No significant mass, stone, or hydronephrosis.    BOWEL: No bowel obstruction. No evidence for colitis or diverticulitis. Unremarkable appendix.    LYMPH NODES: No lymphadenopathy.    VASCULATURE: Moderate atherosclerotic aortoiliac calcification.    PELVIC ORGANS: Unremarkable.    MUSCULOSKELETAL: Unremarkable.      Impression    IMPRESSION:   1.  Diffuse fatty infiltration of the liver.  2.  Nodularity of the liver contour suggests underlying cirrhosis.  3.  Mild splenomegaly has increased since the previous exam, and may be related to portal hypertension.  4.  Mild ascites is new since the previous exam.  5.  Small right pleural effusion is also new since  the previous exam.     Echocardiogram Complete     Value    LVEF  60-65%    [facility]    [id number]  [id number]  [id number]  [id number]^SRINIVAS^ELIZABETH^LESTER     [facility]  Echocardiography Laboratory  [address] [place] [address]     Name: JESÚS VASQUEZ  MRN: [id number]  : 1940  Study Date: 2021 11:59 AM  Age: 81 yrs  Gender: Male  Patient Location: [facility]  Reason For Study: Heart Failure  Ordering Physician: ELIZABETH ESPINO  Referring Physician: Andrew Elizalde  Performed By: Wilder Rodriguez     BSA: 1.9 m2  Height: 64 in  Weight: 183 lb  HR: 52  BP: 126/79 mmHg  ______________________________________________________________________________  Procedure  Complete Portable Echo Adult.  ______________________________________________________________________________  Interpretation Summary     1. Left ventricular systolic function is normal. The visual ejection fraction  is 60-65%.  2. Flattened septum is consistent with RV pressure/volume overload.  3. The right ventricle is severely dilated. Moderately decreased right  ventricular systolic function.  4. There is severe biatrial dilatation.  5. Mild aortic stenosis; mean gradient 15 mmHg, peak velocity 2.6 m/sec,  calculated valve area 1.4 cm2.  6. Severe pulmnary hypertension; The right ventricular systolic pressure is  approximated at 70mmHg plus the right atrial pressure. IVC diameter >2.1 cm  collapsing <50% with sniff suggests a high RA pressure estimated at 15 mmHg or  greater.  7. In comparison with the prior studies, severe pulmonary hypertension is  present with evidence for cor pulmonale.  ______________________________________________________________________________  Left Ventricle  The left ventricle is normal in size. There is normal left ventricular wall  thickness. Left ventricular systolic function is normal. The visual ejection  fraction is 60-65%. Diastolic function not assessed due to atrial  fibrillation.  Flattened septum is consistent with RV pressure/volume overload.     Right Ventricle  The right ventricle is severely dilated. Moderately decreased right  ventricular systolic function.     Atria  The left atrium is severely dilated. The right atrium is severely dilated.  There is no color Doppler evidence of an atrial shunt.     Mitral Valve  There is mild mitral annular calcification. There is trace mitral  regurgitation.     Tricuspid Valve  The tricuspid valve is normal in structure and function. There is mild (1+)  tricuspid regurgitation. The right ventricular systolic pressure is  approximated at 70mmHg plus the right atrial pressure.     Aortic Valve  Mild valvular aortic stenosis. Mild aortic stenosis; mean gradient 15 mmHg,  peak velocity 2.6 m/sec, calculated valve area 1.4 cm2.     Pulmonic Valve  The pulmonic valve is normal in structure and function. There is mild (1+)  pulmonic valvular regurgitation.     Vessels  Normal size aorta. IVC diameter >2.1 cm collapsing <50% with sniff suggests a  high RA pressure estimated at 15 mmHg or greater.     Pericardium  There is no pericardial effusion.     Rhythm  The rhythm was atrial fibrillation.  ______________________________________________________________________________  MMode/2D Measurements & Calculations  IVSd: 1.2 cm     LVIDd: 4.4 cm  LVIDs: 3.1 cm  LVPWd: 1.2 cm  FS: 30.4 %  LV mass(C)d: 185.2 grams  LV mass(C)dI: 98.3 grams/m2  Ao root diam: 3.5 cm  LA dimension: 4.5 cm  asc Aorta Diam: 3.3 cm  LA/Ao: 1.3  LVOT diam: 2.0 cm  LVOT area: 3.1 cm2  LA Volume (BP): 96.8 ml  LA Volume Index (BP): 51.5 ml/m2  RWT: 0.54     Doppler Measurements & Calculations  MV E max roxi: 84.9 cm/sec  MV dec slope: 535.4 cm/sec2  Ao V2 max: 256.5 cm/sec  Ao max P.0 mmHg  Ao V2 mean: 177.8 cm/sec  Ao mean P.7 mmHg  Ao V2 VTI: 53.8 cm  AHSAN(I,D): 1.4 cm2  AHSAN(V,D): 1.4 cm2  LV V1 max P.6 mmHg  LV V1 max: 118.4 cm/sec  LV V1 VTI: 23.2 cm  SV(LVOT): 73.0  ml  SI(LVOT): 38.8 ml/m2  PA acc time: 0.08 sec  TR max aurelio: 416.7 cm/sec  TR max P.5 mmHg  AV Aurelio Ratio (DI): 0.46  AHSAN Index (cm2/m2): 0.72  E/E' av.6  Lateral E/e': 9.9  Medial E/e': 13.2     ______________________________________________________________________________  Report approved by: Liliam Maradiaga 2021 01:26 PM               Discharge Medications   Current Discharge Medication List      START taking these medications    Details   torsemide (DEMADEX) 10 MG tablet Take 1 tablet (10 mg) by mouth daily  Qty: 30 tablet, Refills: 0    Associated Diagnoses: Heart failure with preserved ejection fraction, NYHA class II (H)         CONTINUE these medications which have CHANGED    Details   apixaban ANTICOAGULANT (ELIQUIS) 2.5 MG tablet Take 1 tablet (2.5 mg) by mouth 2 times daily  Qty: 60 tablet, Refills: 0    Associated Diagnoses: Typical atrial flutter (H)         CONTINUE these medications which have NOT CHANGED    Details   atorvastatin (LIPITOR) 40 MG tablet TAKE 1 TABLET (40 MG) BY MOUTH DAILY  Qty: 90 tablet, Refills: 3    Associated Diagnoses: Hyperlipidemia LDL goal <70      azelastine (ASTELIN) 0.1 % nasal spray USE 2 SPRAYS INTO BOTH NOSTRILS TWO TIMES A DAY  Qty: 90 mL, Refills: 3    Comments: Disp 3 bottles  Associated Diagnoses: Chronic rhinitis      Cholecalciferol (VITAMIN D3 PO) Take 5,000 Units by mouth daily      fenofibrate micronized (LOFIBRA) 67 MG capsule Take 1 capsule (67 mg) by mouth every morning (before breakfast)  Qty: 30 capsule, Refills: 11    Associated Diagnoses: Hyperlipidemia LDL goal <70      glucose 4 g CHEW Take 1 tablet by mouth every hour as needed for low blood sugar       insulin aspart (NOVOLOG FLEXPEN) 100 UNIT/ML pen INJECT SUBCUTANEOUSLY THREE TIMES A DAY (BEFORE MEALS) 3 UNITS PER CARB CHOICE (15 GM) APPROX 10-15 UNITS/ MEAL  Qty: 45 mL, Refills: 1    Associated Diagnoses: Type 2 diabetes mellitus without complication, with long-term current  use of insulin (H)      insulin glargine (LANTUS PEN) 100 UNIT/ML pen Inject 66-68 Units Subcutaneous At Bedtime  Qty: 75 mL, Refills: 3    Comments: Profile only for dose change. Pt doesn't require refill at this time  Associated Diagnoses: Type 2 diabetes mellitus without complication, with long-term current use of insulin (H)      levothyroxine (SYNTHROID/LEVOTHROID) 100 MCG tablet TAKE ONE TABLET BY MOUTH ONCE DAILY  Qty: 90 tablet, Refills: 3    Associated Diagnoses: Hypothyroidism, unspecified type      nitroGLYcerin (NITROSTAT) 0.4 MG sublingual tablet Place 1 tablet (0.4 mg) under the tongue every 5 minutes as needed for chest pain  Qty: 25 tablet, Refills: 1    Associated Diagnoses: Atherosclerosis of native coronary artery of native heart with angina pectoris (H)      blood glucose (NO BRAND SPECIFIED) test strip Use to test blood sugar 4 times daily or as directed.  Qty: 400 each, Refills: 1    Comments: Please use what is covered by insurance. Patient states he has an accu-check  Associated Diagnoses: Type 2 diabetes mellitus without complication, with long-term current use of insulin (H)      blood glucose calibration (ACCU-CHEK NORMAN) solution USE AS DIRECTED  Qty: 1 each, Refills: 0    Comments: Medication is being filled for 1 time refill only due to:  Due for office visit  Associated Diagnoses: Type 2 diabetes mellitus without complication, with long-term current use of insulin (H)      Continuous Blood Gluc  (FREESTYLE DELFNIA 2 READER) CHRYSTAL 1 Device continuous  Qty: 1 each, Refills: 1    Associated Diagnoses: Type 2 diabetes mellitus without complication, with long-term current use of insulin (H)      Continuous Blood Gluc Sensor (FREESTYLE DELFINA 2 SENSOR) MISC 1 Device every 14 days  Qty: 6 each, Refills: 3    Associated Diagnoses: Type 2 diabetes mellitus without complication, with long-term current use of insulin (H)      insulin pen needle (NOVOFINE 30) 30G X 8 MM miscellaneous USE AS  DIRECTED FOUR TIMES A DAY  Qty: 400 each, Refills: 2    Associated Diagnoses: Type 2 diabetes mellitus without complication, with long-term current use of insulin (H)         STOP taking these medications       metoprolol tartrate (LOPRESSOR) 25 MG tablet Comments:   Reason for Stopping:             Allergies   Allergies   Allergen Reactions     Omeprazole      diarrhea     Pantoprazole      diarrhea

## 2021-12-28 NOTE — PROGRESS NOTES
[facility]  Cardiology Progress Note  Date of Service: 12/28/2021  Primary Cardiologist: Dr. Adams (general) and Dr. Jarvis (EP)    Assessment & Plan   Pascual Perea is a 81 year old male with past medical history significant for CAD s/p CABG in 1990s, peramanent atrial fibrillation, T2DM, HLP  admitted on 12/25/2021 with abdominal bloating and dyspnea and found to be in acute on chronic heart failure. Lasix was stopped by PCP in September due to elevated calcium.     Interval History:  Metoprolol was previously held and restarted yesterday with HR into the 30-50s and pauses overnight.     COVID status: Negative 12/25    Assessment:   1. Acute on Chronic HFpEF and RV dysfunction     [PTA: Metoprolol 25 mg twice daily.  Not on PTA Lasix as it was stopped by PCP due to elevated calcium in September]    BNP 7439    Weight down 13 pounds. Down 6 lbs in 24 hours    Unsure of dry weight but patient states his goal is around 200 pounds.  Weight was 203 today.    Net -7.8 L    Lasix 20 mg IV BID was increased to IV Lasix 40 mg BID 12/27    Creatine 1.43-1.52-1.56-1.69    2. Atrial fibrillation, permanent    [PTA: Apixaban 5 mg twice daily and metoprolol 25 mg twice daily]    Apixaban for CVA prophylaxis    3. CAD    [PTA: Metoprolol, statin, fenofibrate]    CABG in 1990s     4. Appearance of cirrhosis on CT    GI consulted and recommend outpatient liver clinic follow-up and low-sodium diet    5. HLP    [PTA: Atorvastatin 40 mg daily and fenofibrate 67 mg daily]      Plan:  1. Transition to PO torsemide 10 mg on Thurs 12/30 due to renal function  2. Continue Elquis 5 mg BID   3. Cardiology will sign off  4. Consider outpatient sleep study    [facility]:  Zunilda Rodriguez PA-C 1/4 at 3:20 pm with labs prior    JAKI FERRELL CNP  Pager:  [phone]   (7am - 5pm, M-F)    Physical Exam   Temp: 98.1  F (36.7  C) Temp src: Oral BP: (!) 143/59 Pulse: 54   Resp: 20 SpO2: 93 % O2 Device:  None (Room air)    Vitals:    12/26/21 0557 12/27/21 0650 12/28/21 0600   Weight: 96.1 kg (211 lb 12.8 oz) 95.1 kg (209 lb 9.6 oz) 92.3 kg (203 lb 6.4 oz)       Constitutional:   NAD    Skin:   Warm and dry   Head:   Nontraumatic   Neck:   no JVD   Lungs:   symmetric, clear to auscultation   Cardiovascular:   regular rate and rhythm   Abdomen:   Soft   Extremities and Back:   No edema   Neurological:   Grossly nonfocal     Medications     - MEDICATION INSTRUCTIONS -       - MEDICATION INSTRUCTIONS -       ACE/ARB/ARNI NOT PRESCRIBED         apixaban ANTICOAGULANT  5 mg Oral BID     atorvastatin  40 mg Oral Daily     azelastine  2 spray Both Nostrils BID     fenofibrate  54 mg Oral QAM AC     furosemide  40 mg Intravenous BID     insulin aspart   Subcutaneous TID w/meals     insulin aspart  1-7 Units Subcutaneous TID AC     insulin aspart  1-5 Units Subcutaneous At Bedtime     insulin glargine  68 Units Subcutaneous At Bedtime     levothyroxine  100 mcg Oral QAM AC     metoprolol tartrate  25 mg Oral BID     sodium chloride (PF)  3 mL Intracatheter Q8H       Code Status    No CPR- Do NOT Intubate    Allergies   Allergies   Allergen Reactions     Omeprazole      diarrhea     Pantoprazole      diarrhea       Data     Most Recent 3 CBC's:  Recent Labs   Lab Test 12/28/21  0616 12/25/21  1145 04/13/20  1450   WBC 6.0 5.5 4.3   HGB 12.7* 11.0* 13.4   MCV 84 88 87    224 167     Most Recent 3 BMP's:  Recent Labs   Lab Test 12/28/21  0724 12/28/21  0616 12/28/21  0210 12/27/21  0725 12/27/21  0632 12/26/21  0755 12/26/21  0553   NA  --  137  --   --  140  --  139   POTASSIUM  --  3.9  --   --  3.8  --  3.5   CHLORIDE  --  102  --   --  107  --  105   CO2  --  29  --   --  29  --  29   BUN  --  31*  --   --  24  --  21   CR  --  1.69*  --   --  1.56*  --  1.52*   ANIONGAP  --  6  --   --  4  --  5   STARR  --  10.9*  --   --  10.4*  --  10.2*   * 184* 198*   < > 115*   < > 113*    < > = values in this interval  not displayed.     Most Recent 3 BNP's:  Recent Labs   Lab Test 12/25/21  1145 01/08/20  1514 04/19/19  0848 04/16/18  1350   NTBNPI 7,439* 128  --  1,793   NTBNP  --   --  985*  --

## 2021-12-28 NOTE — PLAN OF CARE
Care plan note:      Recent Vitals:  Temp: 98.1  F (36.7  C) Temp src: Axillary BP: 118/46 Pulse: (!) 46   Resp: 20 SpO2: 95 % O2 Device: None (Room air)      Orientation/Neuro: Alert and Oriented x 4  Pain: The patient is not having any pain.   Tele: A-fib w/ SVR 40-50's   IV medications: None   Mobility: up Independently   Skin: With in normal limits   GI: WDL  : WDL and Strict I&O     Diet: Tolerating diet:   Well  Orders Placed This Encounter      Moderate Consistent Carb (60 g CHO per Meal) Diet      Safety/Concerns:  None  Aggression Color: Green    Plan:  Planning to switch to PO diuretic today.  Pt HR running in the 40- 50's overnight dropping into the 30's at times. Following creatinine. Pt on k+ protocol. Need to review recommended eliquis dose. Possible discharge home, needs CORE and out pt sleep study follow up at d/c.    Continue to monitor.      Kina Smith RN

## 2021-12-28 NOTE — PLAN OF CARE
Occupational Therapy Discharge Summary    Reason for therapy discharge:    Discharged to home.    Progress towards therapy goal(s). See goals on Care Plan in [facility] electronic health record for goal details.  Goals partially met.  Barriers to achieving goals:   discharge from facility.    Therapy recommendation(s):    Continue home exercise program.

## 2021-12-28 NOTE — PLAN OF CARE
Stable. RA .Tele Afib CVR. Up Independently in the room. Voiding in urinal. Walked in halls with staff.Denies pain. K Replaced. Recheck in AM. Plan to switch to PO diuretic tomorrow. Potentially discharge tomorrow as well. Diet education materials attached in nursing navigator.   Pt feels bit overwhelmed with upcoming diet changes, but family is supportive and already getting meal plan ideas for the patient.     Please get a standing weight on pt in AM.

## 2021-12-28 NOTE — PROVIDER NOTIFICATION
MD Notification    Notified Person: MD    Notified Person Name:     Notification Date/Time: 12-    Notification Interaction: Text message    Purpose of Notification: Pt. Is OK to discharge per Cardiology    Orders Received:    Comments:

## 2021-12-28 NOTE — PLAN OF CARE
Pt. Discharged per order to home with son-in-law. Discharge instructions reviewed with pt. And son-in-law at bedside. Medications sent with pt.

## 2021-12-29 ENCOUNTER — PATIENT OUTREACH (OUTPATIENT)
Dept: CARE COORDINATION | Facility: CLINIC | Age: 81
End: 2021-12-29
Payer: COMMERCIAL

## 2021-12-29 ENCOUNTER — TELEPHONE (OUTPATIENT)
Dept: CARDIOLOGY | Facility: CLINIC | Age: 81
End: 2021-12-29
Payer: COMMERCIAL

## 2021-12-29 DIAGNOSIS — Z71.89 OTHER SPECIFIED COUNSELING: ICD-10-CM

## 2021-12-29 LAB
ATRIAL RATE - MUSE: 102 BPM
DIASTOLIC BLOOD PRESSURE - MUSE: NORMAL MMHG
INTERPRETATION ECG - MUSE: NORMAL
P AXIS - MUSE: NORMAL DEGREES
PR INTERVAL - MUSE: NORMAL MS
QRS DURATION - MUSE: 118 MS
QT - MUSE: 468 MS
QTC - MUSE: 468 MS
R AXIS - MUSE: 251 DEGREES
SYSTOLIC BLOOD PRESSURE - MUSE: NORMAL MMHG
T AXIS - MUSE: 263 DEGREES
VENTRICULAR RATE- MUSE: 60 BPM

## 2021-12-29 NOTE — TELEPHONE ENCOUNTER
Patient was evaluated by cardiology while inpatient for abdominal bloating and SOB-acute on chronic HF. PMD had discontinued PTA Lasix 9/2021 due to elevated Ca+. PMH: CAD s/p CABG in the 1990's, permanent A. Fib-Eliquis, DM2, HLD. 12/27/21: Echo showed EF of 60% with severe right ventricle dilation and moderately decreased right ventricular systolic function. There is severe biatrial dilatation. Severe pHTN and Cor Pulmonale now present compared to prior echo. IV Lasix diuresed 13 lbs. Transitioned to po Demadex. PTA Metoprolol was discontinued secondary to SB noted HR 30-50's with pauses while asleep. Called patient to discuss any post hospital d/c questions, review discharge medication, and confirm f/u appts. Patient denied any questions regarding new or changes to PTA medications. Patient denied any SOB, chest pain, edema, or light headedness. RN confirmed with patient that he has labs scheduled on 1/4/21 at 1430 followed with an OV at 1520 scheduled with KOJO Zunilda Rodriguez at our [facility]. Instructed patient to weigh self every AM, after waking and using the restroom, but before breakfast and medications. Call clinic for a weight gain of 2 lbs overnight or 5 lbs in a week. Low Na+ diet encouraged. Pt instructed to bring daily wt/BP diary and medications with to f/u OV. Patient advised to call clinic with any cardiac related questions or concerns prior to his appt. Patient verbalized understanding and agreed with plan. RASHIDA Negrete RN.      ______________________________________________________________________________

## 2021-12-29 NOTE — PROGRESS NOTES
Clinic Care Coordination Contact    Background: Care Coordination referral placed from \[facility]\"" discharge report for reason of patient meeting criteria for a TCM outreach call by [facility] team.    Assessment: Upon chart review, [facility] Team member will cancel/close the referral for TCM outreach due to reason below:    Hospital discharge follow up outreach completed by Clinic RN.    Plan: Care Coordination referral for TCM outreach canceled.    Darlene Johnson  [facility]

## 2022-01-03 DIAGNOSIS — Z79.4 TYPE 2 DIABETES MELLITUS WITHOUT COMPLICATION, WITH LONG-TERM CURRENT USE OF INSULIN (H): ICD-10-CM

## 2022-01-03 DIAGNOSIS — E11.9 TYPE 2 DIABETES MELLITUS WITHOUT COMPLICATION, WITH LONG-TERM CURRENT USE OF INSULIN (H): ICD-10-CM

## 2022-01-04 RX ORDER — INSULIN ADMIN. SUPPLIES
INSULIN PEN (EA) SUBCUTANEOUS
Qty: 400 EACH | Refills: 2 | Status: SHIPPED | OUTPATIENT
Start: 2022-01-04 | End: 2023-06-26

## 2022-01-10 ENCOUNTER — OFFICE VISIT (OUTPATIENT)
Dept: CARDIOLOGY | Facility: CLINIC | Age: 82
End: 2022-01-10
Attending: NURSE PRACTITIONER
Payer: COMMERCIAL

## 2022-01-10 ENCOUNTER — OFFICE VISIT (OUTPATIENT)
Dept: INTERNAL MEDICINE | Facility: CLINIC | Age: 82
End: 2022-01-10
Payer: COMMERCIAL

## 2022-01-10 ENCOUNTER — LAB (OUTPATIENT)
Dept: LAB | Facility: CLINIC | Age: 82
End: 2022-01-10
Payer: COMMERCIAL

## 2022-01-10 VITALS
BODY MASS INDEX: 26.95 KG/M2 | WEIGHT: 199 LBS | HEART RATE: 91 BPM | DIASTOLIC BLOOD PRESSURE: 68 MMHG | OXYGEN SATURATION: 98 % | SYSTOLIC BLOOD PRESSURE: 129 MMHG | HEIGHT: 72 IN

## 2022-01-10 DIAGNOSIS — I25.119 ATHEROSCLEROSIS OF NATIVE CORONARY ARTERY OF NATIVE HEART WITH ANGINA PECTORIS (H): ICD-10-CM

## 2022-01-10 DIAGNOSIS — I50.30 HEART FAILURE WITH PRESERVED EJECTION FRACTION, NYHA CLASS II (H): ICD-10-CM

## 2022-01-10 DIAGNOSIS — I50.30 HEART FAILURE WITH PRESERVED EJECTION FRACTION, NYHA CLASS II (H): Primary | ICD-10-CM

## 2022-01-10 DIAGNOSIS — K74.60 CIRRHOSIS OF LIVER WITH ASCITES, UNSPECIFIED HEPATIC CIRRHOSIS TYPE (H): ICD-10-CM

## 2022-01-10 DIAGNOSIS — I50.9 HEART FAILURE, UNSPECIFIED HF CHRONICITY, UNSPECIFIED HEART FAILURE TYPE (H): ICD-10-CM

## 2022-01-10 DIAGNOSIS — R18.8 CIRRHOSIS OF LIVER WITH ASCITES, UNSPECIFIED HEPATIC CIRRHOSIS TYPE (H): ICD-10-CM

## 2022-01-10 DIAGNOSIS — N18.31 STAGE 3A CHRONIC KIDNEY DISEASE (H): ICD-10-CM

## 2022-01-10 DIAGNOSIS — I48.91 ATRIAL FIBRILLATION, UNSPECIFIED TYPE (H): ICD-10-CM

## 2022-01-10 DIAGNOSIS — G47.33 OSA (OBSTRUCTIVE SLEEP APNEA): ICD-10-CM

## 2022-01-10 DIAGNOSIS — Z79.4 TYPE 2 DIABETES MELLITUS WITHOUT COMPLICATION, WITH LONG-TERM CURRENT USE OF INSULIN (H): ICD-10-CM

## 2022-01-10 DIAGNOSIS — E83.52 HYPERCALCEMIA: ICD-10-CM

## 2022-01-10 DIAGNOSIS — K75.9 HEPATITIS: ICD-10-CM

## 2022-01-10 DIAGNOSIS — Z11.59 ENCOUNTER FOR SCREENING FOR OTHER VIRAL DISEASES: ICD-10-CM

## 2022-01-10 DIAGNOSIS — E11.9 TYPE 2 DIABETES MELLITUS WITHOUT COMPLICATION, WITH LONG-TERM CURRENT USE OF INSULIN (H): ICD-10-CM

## 2022-01-10 LAB
ALBUMIN SERPL-MCNC: 4.2 G/DL (ref 3.4–5)
ALP SERPL-CCNC: 99 U/L (ref 40–150)
ALT SERPL W P-5'-P-CCNC: 57 U/L (ref 0–70)
ANION GAP SERPL CALCULATED.3IONS-SCNC: 3 MMOL/L (ref 3–14)
AST SERPL W P-5'-P-CCNC: 42 U/L (ref 0–45)
BILIRUB DIRECT SERPL-MCNC: 0.3 MG/DL (ref 0–0.2)
BILIRUB SERPL-MCNC: 0.7 MG/DL (ref 0.2–1.3)
BUN SERPL-MCNC: 19 MG/DL (ref 7–30)
CALCIUM SERPL-MCNC: 10.9 MG/DL (ref 8.5–10.1)
CHLORIDE BLD-SCNC: 103 MMOL/L (ref 94–109)
CO2 SERPL-SCNC: 33 MMOL/L (ref 20–32)
CREAT SERPL-MCNC: 1.33 MG/DL (ref 0.66–1.25)
ERYTHROCYTE [SEDIMENTATION RATE] IN BLOOD BY WESTERGREN METHOD: 6 MM/HR (ref 0–20)
FERRITIN SERPL-MCNC: 169 NG/ML (ref 26–388)
GFR SERPL CREATININE-BSD FRML MDRD: 54 ML/MIN/1.73M2
GLUCOSE BLD-MCNC: 164 MG/DL (ref 70–99)
POTASSIUM BLD-SCNC: 4.4 MMOL/L (ref 3.4–5.3)
PROT SERPL-MCNC: 7.1 G/DL (ref 6.8–8.8)
PTH-INTACT SERPL-MCNC: 54 PG/ML (ref 18–80)
SODIUM SERPL-SCNC: 139 MMOL/L (ref 133–144)

## 2022-01-10 PROCEDURE — 87340 HEPATITIS B SURFACE AG IA: CPT | Performed by: INTERNAL MEDICINE

## 2022-01-10 PROCEDURE — 86706 HEP B SURFACE ANTIBODY: CPT | Performed by: INTERNAL MEDICINE

## 2022-01-10 PROCEDURE — 82248 BILIRUBIN DIRECT: CPT | Performed by: INTERNAL MEDICINE

## 2022-01-10 PROCEDURE — 99214 OFFICE O/P EST MOD 30 MIN: CPT | Performed by: INTERNAL MEDICINE

## 2022-01-10 PROCEDURE — 99215 OFFICE O/P EST HI 40 MIN: CPT | Performed by: NURSE PRACTITIONER

## 2022-01-10 PROCEDURE — 80053 COMPREHEN METABOLIC PANEL: CPT | Performed by: NURSE PRACTITIONER

## 2022-01-10 PROCEDURE — 36415 COLL VENOUS BLD VENIPUNCTURE: CPT | Performed by: NURSE PRACTITIONER

## 2022-01-10 PROCEDURE — 83970 ASSAY OF PARATHORMONE: CPT | Performed by: INTERNAL MEDICINE

## 2022-01-10 PROCEDURE — 82728 ASSAY OF FERRITIN: CPT | Performed by: INTERNAL MEDICINE

## 2022-01-10 PROCEDURE — 82306 VITAMIN D 25 HYDROXY: CPT | Performed by: INTERNAL MEDICINE

## 2022-01-10 PROCEDURE — 85652 RBC SED RATE AUTOMATED: CPT | Performed by: INTERNAL MEDICINE

## 2022-01-10 PROCEDURE — 86803 HEPATITIS C AB TEST: CPT | Performed by: INTERNAL MEDICINE

## 2022-01-10 RX ORDER — TORSEMIDE 10 MG/1
10 TABLET ORAL EVERY OTHER DAY
Qty: 45 TABLET | Refills: 3 | Status: SHIPPED | OUTPATIENT
Start: 2022-01-10 | End: 2022-01-10

## 2022-01-10 RX ORDER — TORSEMIDE 10 MG/1
10 TABLET ORAL DAILY
Qty: 90 TABLET | Refills: 3 | Status: SHIPPED | OUTPATIENT
Start: 2022-01-10 | End: 2022-06-09

## 2022-01-10 ASSESSMENT — MIFFLIN-ST. JEOR
SCORE: 1680.15
SCORE: 1650.66

## 2022-01-10 NOTE — PATIENT INSTRUCTIONS
Stop ALL Vitamin D supplements  Reduce Torsemide to 10mg tab, 1 tab   every other day as per cardiology  Continue other medications  Weight self lopez.  Call if  weight up 2 pounds in 1 day or 5 pounds in 1 week   Labs today as ordered   Repeat nonfasting A1C and BMP lab in 2 weeks\   See [facility] re cirrhosis of liver. Call [phone]   Appt with  Sleep clinic re: obstructive sleep apnea and possaible contribution to weak right heart function and swelling. Call [phone] for consult appointment

## 2022-01-10 NOTE — PATIENT INSTRUCTIONS
continue with gas-x    Decrease torsemide to 10 mg every other day  continue to take your weight daily   If  You weight goes up 5# in a week please call.   If you have problems or questions please call the RNs (Meri Chin, & Tatiana) at [phone]    Follow up with Valentine Goodrich in 1 month with BMP prior.  Follow up with Dr. Adams       Walk every TV commerial

## 2022-01-10 NOTE — LETTER
1/10/2022    Andrew Elizalde MD  [address] [facility] [address]    RE: Pascual Perea       Dear Colleague,     I had the pleasure of seeing Pascual Perea in the [facility].      Cardiology Clinic Progress Note    Service Date: 01/10/22    Primary Cardiologist:  Dr. Adams (general) and Dr. Jarvis (EP)      Reason for Visit: Follow-up after being hospitalized    HPI:   I had the pleasure of seeing . Pascual Perea in the clinic today and he is a very pleasant 81 year old male with a past medical history notable for    1. HFpEF/severe pulmonary HTN/Cor Pulmonale  2. Bradycardia  3. Coronary artery disease.  s/p CABG 1997  4. Chronic Atrial fibrillation/flutter.   first diagnosed 5/2016, s/p DCCV 2016 and again 4/2018. Started on Amiodarone and underwent DCCV 2/2019 and 5/2019 (after cholecystectomy triggered AFib 3/2019).  Amiodarone stopped 11/2019 due to abnl LFTs.  5. Ventricular tachycardia   6. Hypertension  7. BRANDON  8. Diabetes  9. Hypothyroidism  10. CKD  11. Possible cirrhosis.  Follow with GI recommend avoidance of fructose and other refined carbohydrates    Diagnostics  I have personally reviewed the following reports    Echo (12/2021) 1. Left ventricular systolic function is normal. The visual ejection fraction is 60-65%. Flattened septum is consistent with RV pressure/volume overload. The right ventricle is severely dilated. Moderately decreased right ventricular systolic function. There is severe biatrial dilatation. Mild aortic stenosis; mean gradient 15 mmHg, peak velocity 2.6 m/sec, calculated valve area 1.4 cm2. Severe pulmonary hypertension; The right ventricular systolic pressure is approximated at 70mmHg plus the right atrial pressure. IVC diameter >2.1 cm collapsing <50% with sniff suggests a high RA pressure estimated at 15 mmHg or greater.  In comparison with the prior studies, severe pulmonary hypertension is present with evidence for cor pulmonale.      In  Patient is following up on the refill request below   September 2021, patient's Lasix have been discontinued due to elevated calcium.    In December 2021 he was admitted with abdominal bloating and dyspnea and found to be in acute on chronic heart failure.  His BNP was 7439, he was diuresed 13 pounds.  (Goal weight approximately 200 pounds).  Questionable cirrhosis on CT and GI was consulted and asked to follow-up in clinic.   He was transitioned to torsemide 10 mg daily his metoprolol was discontinued due to bradycardia.      Today, he reports feeling good.  His weight has been 199-200# since 12/30/2021 and his BMP shows improvement in creatinine.   He has taken a nitroglycerin x2 when he is sitting due to chest discomfort which resolves with standing up.  He denies shortness of breath, dyspnea on exertion, orthopnea, PND, lower extremity edema, palpitations, dizziness, presyncope, or syncope.   Reports taking medications as prescribed including his apixaban and denies any bleeding.    ASSESSMENT AND PLAN:    Chronic AFib    DCCV 5/2019     Amiodarone stopped 11/2019 due to elevated LFTs.    For anticoagulation for CHADS VASC 4 (age++, CAD, HTN) he is currently taking apixaban 2.5 mg twice daily.   His last hgb was 12.7 (12/2021)    Coronary artery disease    CABG x 1 in 1990's    Currently not on metoprolol due to bradycardia     Currently taking atorvastatin 40 mg daily.    Has nitroglycerin as needed and has taken 2 in the past few weeks due to chest discomfort when sitting.  Pt is allergic to omeprazole and Protonix    Bradycardia    Currently not on AV node blockers    No complaints of bradycardia    HFpEF/severe pulmonary HTN/Cor Pulmonale    Currently torsemide 10 mg daily    Today's BMP shows creatinine trending down    Weight stable at 200#    Possible cirrhosis.     Follow with GI recommend    Hypercalcemia    PTH and vitamin D were normal    Plan:    continue with current medications - torsemide 10 mg daily.      Follow up in 1 month with BMP  prior    Follow up with Dr. Adams    Walk every TV commercial    Please call the clinic if questions or problems arise      Thank you for the opportunity to participate in this pleasant patient's care. We would be happy to see him sooner if needed for any concerns in the meantime.         JAKI Mittal [facility]  Text Page  (M-F 8:00 am - 4:30 pm)    Orders this Visit:  Orders Placed This Encounter   Procedures     Basic metabolic panel     Follow-Up with Cardiac Advanced Practice Provider     Follow-Up with Cardiologist     Orders Placed This Encounter   Medications     apixaban ANTICOAGULANT (ELIQUIS) 2.5 MG tablet     Sig: Take 1 tablet (2.5 mg) by mouth 2 times daily     Dispense:  180 tablet     Refill:  3     DISCONTD: torsemide (DEMADEX) 10 MG tablet     Sig: Take 1 tablet (10 mg) by mouth every other day     Dispense:  45 tablet     Refill:  3     torsemide (DEMADEX) 10 MG tablet     Sig: Take 1 tablet (10 mg) by mouth daily     Dispense:  90 tablet     Refill:  3     Medications Discontinued During This Encounter   Medication Reason     torsemide (DEMADEX) 10 MG tablet Reorder     apixaban ANTICOAGULANT (ELIQUIS) 2.5 MG tablet Reorder     torsemide (DEMADEX) 10 MG tablet Reorder     Encounter Diagnoses   Name Primary?     Heart failure with preserved ejection fraction, NYHA class II (H)      Atrial fibrillation, unspecified type (H)      Atherosclerosis of native coronary artery of native heart with angina pectoris (H)      Typical atrial flutter (H)        CURRENT MEDICATIONS:  Current Outpatient Medications   Medication Sig Dispense Refill     apixaban ANTICOAGULANT (ELIQUIS) 2.5 MG tablet Take 1 tablet (2.5 mg) by mouth 2 times daily 180 tablet 3     atorvastatin (LIPITOR) 40 MG tablet TAKE 1 TABLET (40 MG) BY MOUTH DAILY 90 tablet 3     azelastine (ASTELIN) 0.1 % nasal spray USE 2 SPRAYS INTO BOTH NOSTRILS TWO TIMES A DAY 90 mL 3     blood glucose (NO BRAND SPECIFIED) test strip Use to  test blood sugar 4 times daily or as directed. 400 each 1     blood glucose calibration (ACCU-CHEK NORMAN) solution USE AS DIRECTED 1 each 0     Continuous Blood Gluc  (FREESTYLE DELFINA 2 READER) CHRYSTAL 1 Device continuous 1 each 1     Continuous Blood Gluc Sensor (FREESTYLE DELFINA 2 SENSOR) MISC 1 Device every 14 days 6 each 3     fenofibrate micronized (LOFIBRA) 67 MG capsule Take 1 capsule (67 mg) by mouth every morning (before breakfast) 30 capsule 11     glucose 4 g CHEW Take 1 tablet by mouth every hour as needed for low blood sugar        insulin aspart (NOVOLOG FLEXPEN) 100 UNIT/ML pen INJECT SUBCUTANEOUSLY THREE TIMES A DAY (BEFORE MEALS) 3 UNITS PER CARB CHOICE (15 GM) APPROX 10-15 UNITS/ MEAL 45 mL 1     insulin glargine (LANTUS PEN) 100 UNIT/ML pen Inject 66-68 Units Subcutaneous At Bedtime 75 mL 3     levothyroxine (SYNTHROID/LEVOTHROID) 100 MCG tablet TAKE ONE TABLET BY MOUTH ONCE DAILY 90 tablet 3     nitroGLYcerin (NITROSTAT) 0.4 MG sublingual tablet Place 1 tablet (0.4 mg) under the tongue every 5 minutes as needed for chest pain 25 tablet 1     NOVOFINE AUTOCOVER PEN NEEDLE 30G X 8 MM miscellaneous USE AS DIRECTED FOUR TIMES A  each 2     torsemide (DEMADEX) 10 MG tablet Take 1 tablet (10 mg) by mouth daily 90 tablet 3       ALLERGIES  Allergies   Allergen Reactions     Omeprazole      diarrhea     Pantoprazole      diarrhea       PAST MEDICAL, SURGICAL, SOCIAL FAMILY HISTORY:  History was reviewed and updated as needed, see medical record.    Review of Systems:  Skin:  Negative     Eyes:  Negative    ENT:  Negative    Respiratory:  Positive for dyspnea on exertion  Cardiovascular:    Positive for  Gastroenterology: Positive for reflux;heartburn  Genitourinary:  Negative    Musculoskeletal:  Negative    Neurologic:  Negative    Psychiatric:  Negative    Heme/Lymph/Imm:  Positive for    Endocrine:  Positive for thyroid disorder;diabetes     Physical Exam:  Vitals: /68 (BP Location:  Right arm, Cuff Size: Adult Regular)   Pulse 91   Ht 1.829 m (6')   Wt 90.3 kg (199 lb)   SpO2 98%   BMI 26.99 kg/m     Wt Readings from Last 4 Encounters:   01/10/22 94.8 kg (209 lb)   01/10/22 90.3 kg (199 lb)   12/28/21 92.3 kg (203 lb 6.4 oz)   09/14/21 96.2 kg (212 lb)     CONSTITUTIONAL: Appears his stated age, well nourished, and in no acute distress.  HEENT: Pupils equal, round. Sclerae nonicteric.    NECK: Supple, no masses appreciated.  C/V:  Regular rate and rhythm,   RESP: Respirations are unlabored. Lungs are clear to auscultation bilaterally without wheezing, rales, or rhonchi.  GI: Abdomen soft, non-tender, non-distended.  EXTREM:  No clubbing, cyanosis, or lower extremity edema bilaterally.   NEURO: Alert and oriented, cooperative. Gait steady. No gross focal deficits.   PSYCH: Affect appropriate. Mentation normal. Responds to questions appropriately.  SKIN: Warm and dry. No apparent rashes or bruising.     Recent Lab Results:  LIPID RESULTS:  Lab Results   Component Value Date    CHOL 141 06/10/2021    HDL 40 06/10/2021    LDL 57 06/10/2021    TRIG 221 (H) 06/10/2021    CHOLHDLRATIO 3.3 05/08/2015     LIVER ENZYME RESULTS:  Lab Results   Component Value Date    AST 42 01/10/2022    AST 27 06/10/2021    ALT 57 01/10/2022    ALT 37 06/10/2021     CBC RESULTS:  Lab Results   Component Value Date    WBC 6.0 12/28/2021    WBC 4.3 04/13/2020    RBC 4.71 12/28/2021    RBC 4.46 04/13/2020    HGB 12.7 (L) 12/28/2021    HGB 13.4 04/13/2020    HCT 39.7 (L) 12/28/2021    HCT 38.8 (L) 04/13/2020    MCV 84 12/28/2021    MCV 87 04/13/2020    MCH 27.0 12/28/2021    MCH 30.0 04/13/2020    MCHC 32.0 12/28/2021    MCHC 34.5 04/13/2020    RDW 14.1 12/28/2021    RDW 13.3 04/13/2020     12/28/2021     04/13/2020     BMP RESULTS:  Lab Results   Component Value Date     01/10/2022     07/01/2021    POTASSIUM 4.4 01/10/2022    POTASSIUM 4.1 07/01/2021    CHLORIDE 103 01/10/2022    CHLORIDE 106  07/01/2021    CO2 33 (H) 01/10/2022    CO2 29 07/01/2021    ANIONGAP 3 01/10/2022    ANIONGAP 5 07/01/2021     (H) 01/10/2022     (H) 07/01/2021    BUN 19 01/10/2022    BUN 20 07/01/2021    CR 1.33 (H) 01/10/2022    CR 1.69 (H) 07/01/2021    GFRESTIMATED 54 (L) 01/10/2022    GFRESTIMATED 38 (L) 07/01/2021    GFRESTBLACK 44 (L) 07/01/2021    STARR 10.9 (H) 01/10/2022    STARR 10.6 (H) 07/01/2021        This note was completed in part using Dragon voice recognition software. Although reviewed after completion, some word and grammatical errors may occur.      JAKI Mittal CNP   [facility]      cc:   JAKI Carranza CNP  [address]  [place] [address]

## 2022-01-10 NOTE — PROGRESS NOTES
ASSESSMENT:   1. Heart failure, unspecified HF chronicity, unspecified heart failure type (H)  Cardiology note from earlier today reviewed.  Breathing doing much better.  Getting slightly intravascularly dry and torsemide dose reduced.  Continue management per cardiology    2. Type 2 diabetes mellitus without complication, with long-term current use of insulin (H)  Last A1c 6.9.   Blood sugars as below.  Bedtime sugars running little high.  Counseled regarding carbohydrate reduction throughout the day but especially with dinner/supper.  Continue current medications for now and recheck A1c  - Hemoglobin A1c; Future    3. Stage 3a chronic kidney disease (H)  Slight worsening recently.  Torsemide dose reduced today by cardiology.  Future labs ordered  - Basic metabolic panel; Future    4. Cirrhosis of liver with ascites, unspecified hepatic cirrhosis type (H)  See discharge summary note and GI notes from hospitalization.  Thought likely related to NAFLD.  Labs as ordered to rule out other causes.  Patient to follow-up with GI  - Hepatitis B surface antigen; Future  - Hepatitis C antibody; Future  - Ferritin; Future  - Erythrocyte sedimentation rate auto; Future  - Adult Gastro Ref - Consult Only; Future  - Hepatitis B surface antigen  - Hepatitis C antibody  - Ferritin  - Erythrocyte sedimentation rate auto    5. BRANDON (obstructive sleep apnea)  Not using CPAP recently.  Potentially contributing to heart failure symptoms.  Patient to see sleep clinic for further eval and management  - SLEEP EVALUATION & MANAGEMENT REFERRAL - ADULT -; Future    6. Hypercalcemia  Calcium remains elevated.  Will stop vitamin D.  Labs to assess for possible hyperparathyroidism  - Parathyroid Hormone Intact; Future  - Vitamin D Deficiency; Future  - Basic metabolic panel; Future  - Parathyroid Hormone Intact  - Vitamin D Deficiency    7. Hepatitis   See #4  - Hepatic panel (Albumin, ALT, AST, Bili, Alk Phos, TP); Future  - Hepatitis B  Surface Antibody; Future  - Hepatic panel (Albumin, ALT, AST, Bili, Alk Phos, TP)  - Hepatitis B Surface Antibody    8. Encounter for screening for other viral diseases   - Hepatitis B Surface Antibody; Future  - Hepatitis B Surface Antibody      PLAN:   Stop ALL Vitamin D supplements  Reduce Torsemide to 10mg tab, 1 tab   every other day as per cardiology  Continue other medications  Weigh self lopez.  Call if  weight up 2 pounds in 1 day or 5 pounds in 1 week   Labs today as ordered   Repeat nonfasting A1C and BMP lab in 2 weeks   See [facility] re cirrhosis of liver. Call [phone]   Appt with [facility] re: obstructive sleep apnea and possaible contribution to weak right heart function and swelling. Call [phone] for consult appointment      (Chart documentation was completed, in part, with [employer] voice-recognition software. Even though reviewed, some grammatical, spelling, and word errors may remain.)    Andrew Elizalde MD  Internal Medicine Department  [facility]    Max Naylor is a 81 year old who presents for the following health issues     HPI       Hospital Follow-up Visit:    Hospital/Nursing Home/IP Rehab Facility: [facility]  Date of Admission: 12/25/2021  Date of Discharge: 12/28/2021  Reason(s) for Admission: Heart Failure, Dyspnea    Tele contact 12/29/21      Was your hospitalization related to COVID-19? No   Problems taking medications regularly:  None  Medication changes since discharge: None  Problems adhering to non-medication therapy:  None    Summary of hospitalization:  [facility] discharge summary reviewed  Diagnostic Tests/Treatments reviewed.  Follow up needed: labs  Other Healthcare Providers Involved in Patient s Care:         cardiology  Update since discharge: improved. Post Discharge Medication Reconciliation: discharge medications reconciled and changed, per note/orders.  Plan of care communicated  with patient           Discharge summary reviewed. Part of the summary as below:      [facility]  Hospitalist Discharge Summary       Date of Admission:  12/25/2021  Date of Discharge:  12/28/2021  Discharging Provider: Kolton Christensen MD           Discharge Diagnoses        Acute on Chronic HFpEF and RV dysfunction               Follow-ups Needed After Discharge     Follow-up Appointments     Follow-up and recommended labs and tests       Follow up with primary care provider, Andrew Elizalde, within 7 days for   hospital follow- up.  The following labs/tests are recommended: BMP /   LFTs.                  Unresulted Labs Ordered in the Past 30 Days of this Admission      No orders found from 11/25/2021 to 12/26/2021.                Discharge Disposition         Discharged to home  Condition at discharge: Stable           Hospital Course     Pascual Perea is a pleasant 81 year old gentleman admitted 12/25/2021 for evaluation of progressive SOB.  Current problems include:     Probable acute HF with pEF  Severe Pulmonary HTN  Cor Pulmonale  Presented with progressive PEREZ, and possible mild orthopnea.  BNP, JVP elevated, peripheral edema. Was on lasix daily until follow up with PCP in September when it was stopped due to hypercalcemia.   EF >70% on MPI stress test and exercise stress test  - appreciate Cardiology recs  - IV Lasix 40 mg x BID -> Transition to PO torsemide 10 mg tomorrow dependant on renal function and diuresis     Chronic kidney disease, with mild rise in Cr this a.m. to 1.56  Plan:  - repeat BMP 12/28     Chronic A-fib; rates stable.  - previously on amiodarone, but was stopped due to liver enzyme elevation  - followed by Joleen Marrero PA-C and Dr. Jarvis  - stopped metoprolol due to bradycardia in 30s intermittently  - continue apixaban     Sinus pauses overnight, longest 3.1 sec.  No new Sx.  No recurrence today.  - metoprolol on hold  - Cardiology to follow up 12/27     Probable  compensated NAFLD cirrhosis; stable.  - reviewed care w/ GI team/Dr. Erik Baca earlier today - appreciate his input.  Per Dr. Baca, Dayday will need:  - weight loss and strict avoidance of fructose and other refined carbs (encouraged)  - low Na diet  - should be followed as an outpt in [facility] liver clinic for HCC screening and at some point, variceal screening.  [facility] to arrange this appointment.     Hypercalcemia, mild  Per prior note: has diagnosis of hyperparathyroidism in chart, but PTH and vit D are not elevated. Phos was low when last checked in 2019. Further w/u with PSA, SPEP, etc seem to be negative as well. Lasix was previously stopped due to      CAD s/p single vessel CABG in 90s  Dyslipidemia  - continue apixaban, reduced to 2.5 mg BID due to Cr  - continue fenofibrate, atorvastatin     DM Type 2, well controlled, on insulin.   - continue PTA glargine 68 and aspart 3u/15g CHO with MDSSI       ECHO 12/25/21:    Interpretation Summary     1. Left ventricular systolic function is normal. The visual ejection fraction  is 60-65%.  2. Flattened septum is consistent with RV pressure/volume overload.  3. The right ventricle is severely dilated. Moderately decreased right  ventricular systolic function.  4. There is severe biatrial dilatation.  5. Mild aortic stenosis; mean gradient 15 mmHg, peak velocity 2.6 m/sec,  calculated valve area 1.4 cm2.  6. Severe pulmnary hypertension; The right ventricular systolic pressure is  approximated at 70mmHg plus the right atrial pressure. IVC diameter >2.1 cm  collapsing <50% with sniff suggests a high RA pressure estimated at 15 mmHg or  greater.  7. In comparison with the prior studies, severe pulmonary hypertension is  present with evidence for cor pulmonale.        Most recent lab results reviewed with pt.       Denies SOB now. On low salt intake now. Denies current abd pain or chest pain.    1 beer 1 month ago. Rare ETOH overall   Hx BRANDON. Not on CPAP for years. Mild  fatigue   Cirrhosis on liver CT   Saw cardiology today. Torsemide reduced to 10mg every  other  Day. Weight down at home to 200 lbs   Still on Vit D. Has elevated calcium.    7-day blood sugar averages 4 AM, noon, supper, bedtime = 128, 150, 140, 194  14-day average = 146, 170, 175, 204     Component      Latest Ref Rng & Units 12/25/2021 12/26/2021 12/28/2021 2/19/2022   WBC      4.0 - 11.0 10e3/uL 5.5      RBC Count      4.40 - 5.90 10e6/uL 4.08 (L)      Hemoglobin      13.3 - 17.7 g/dL 11.0 (L)      Hematocrit      40.0 - 53.0 % 35.8 (L)      MCV      78 - 100 fL 88      MCH      26.5 - 33.0 pg 27.0      MCHC      31.5 - 36.5 g/dL 30.7 (L)      RDW      10.0 - 15.0 % 14.6      Platelet Count      150 - 450 10e3/uL 224      % Neutrophils      % 69      % Lymphocytes      % 18      % Monocytes      % 9      % Eosinophils      % 3      % Basophils      % 1      % Immature Granulocytes      % 0      NRBCs per 100 WBC      <1 /100 0      Absolute Neutrophils      1.6 - 8.3 10e3/uL 3.8      Absolute Lymphocytes      0.8 - 5.3 10e3/uL 1.0      Absolute Monocytes      0.0 - 1.3 10e3/uL 0.5      Absolute Eosinophils      0.0 - 0.7 10e3/uL 0.2      Absolute Basophils      0.0 - 0.2 10e3/uL 0.1      Absolute Immature Granulocytes      <=0.4 10e3/uL 0.0      Absolute NRBCs      10e3/uL 0.0      Sodium      133 - 144 mmol/L 139 139 137    Potassium      3.4 - 5.3 mmol/L 4.1 3.5 3.9    Chloride      94 - 109 mmol/L 107 105 102    Carbon Dioxide      20 - 32 mmol/L 28 29 29    Anion Gap      3 - 14 mmol/L 4 5 6    Urea Nitrogen      7 - 30 mg/dL 23 21 31 (H)    Creatinine      0.66 - 1.25 mg/dL 1.43 (H) 1.52 (H) 1.69 (H)    Calcium      8.5 - 10.1 mg/dL 10.3 (H) 10.2 (H) 10.9 (H)    Glucose      70 - 99 mg/dL 164 (H) 113 (H) 184 (H)    Alkaline Phosphatase      40 - 150 U/L 93      AST      0 - 45 U/L 43      ALT      0 - 70 U/L 77 (H)      Protein Total      6.8 - 8.8 g/dL 7.1      Albumin      3.4 - 5.0 g/dL 3.8     "  Bilirubin Total      0.2 - 1.3 mg/dL 0.7      GFR Estimate      >60 mL/min/1.73m2 50 (L) 46 (L) 41 (L)    Troponin I High Sensitivity      <79 ng/L    32       Additional ROS:   Constitutional, HEENT, Cardiovascular, Pulmonary, GI and , Neuro, MSK and Psych review of systems/symptoms are otherwise negative or unchanged from previous, except as noted above.      OBJECTIVE:  /70   Pulse 92   Temp 97  F (36.1  C) (Temporal)   Resp 16   Ht 1.803 m (5' 11\")   Wt 90.3 kg (199 lb)   SpO2 98%   BMI 27.75 kg/m     Estimated body mass index is 29.15 kg/m  as calculated from the following:    Height as of this encounter: 1.803 m (5' 11\").    Weight as of this encounter: 94.8 kg (209 lb).     HENT: ear canals and TM's normal and nose and mouth without ulcers or lesions   Neck: no adenopathy. Thyroid normal to palpation. No bruits. No JVD  Pulm: Lungs clear to auscultation   CV: Regular rates and rhythm  GI: Soft, nontender, Normal active bowel sounds, No hepatosplenomegaly or masses palpable  Ext: Peripheral pulses intact. Trace  BLE edema.  Neuro: Normal strength and tone, sensory exam grossly normal              "

## 2022-01-11 LAB
DEPRECATED CALCIDIOL+CALCIFEROL SERPL-MC: 45 UG/L (ref 20–75)
HBV SURFACE AB SERPL IA-ACNC: 0.11 M[IU]/ML
HBV SURFACE AG SERPL QL IA: NONREACTIVE
HCV AB SERPL QL IA: NONREACTIVE

## 2022-01-24 ENCOUNTER — LAB (OUTPATIENT)
Dept: LAB | Facility: CLINIC | Age: 82
End: 2022-01-24
Payer: COMMERCIAL

## 2022-01-24 DIAGNOSIS — E83.52 HYPERCALCEMIA: ICD-10-CM

## 2022-01-24 DIAGNOSIS — I50.30 HEART FAILURE WITH PRESERVED EJECTION FRACTION, NYHA CLASS II (H): ICD-10-CM

## 2022-01-24 DIAGNOSIS — Z79.4 TYPE 2 DIABETES MELLITUS WITHOUT COMPLICATION, WITH LONG-TERM CURRENT USE OF INSULIN (H): ICD-10-CM

## 2022-01-24 DIAGNOSIS — E11.9 TYPE 2 DIABETES MELLITUS WITHOUT COMPLICATION, WITH LONG-TERM CURRENT USE OF INSULIN (H): ICD-10-CM

## 2022-01-24 LAB — HBA1C MFR BLD: 8 % (ref 0–5.6)

## 2022-01-24 PROCEDURE — 84460 ALANINE AMINO (ALT) (SGPT): CPT

## 2022-01-24 PROCEDURE — 80048 BASIC METABOLIC PNL TOTAL CA: CPT

## 2022-01-24 PROCEDURE — 83036 HEMOGLOBIN GLYCOSYLATED A1C: CPT

## 2022-01-24 PROCEDURE — 36415 COLL VENOUS BLD VENIPUNCTURE: CPT

## 2022-01-25 LAB
ALT SERPL W P-5'-P-CCNC: 50 U/L (ref 0–70)
ANION GAP SERPL CALCULATED.3IONS-SCNC: 2 MMOL/L (ref 3–14)
BUN SERPL-MCNC: 20 MG/DL (ref 7–30)
CALCIUM SERPL-MCNC: 11.2 MG/DL (ref 8.5–10.1)
CHLORIDE BLD-SCNC: 105 MMOL/L (ref 94–109)
CO2 SERPL-SCNC: 32 MMOL/L (ref 20–32)
CREAT SERPL-MCNC: 1.42 MG/DL (ref 0.66–1.25)
GFR SERPL CREATININE-BSD FRML MDRD: 50 ML/MIN/1.73M2
GLUCOSE BLD-MCNC: 156 MG/DL (ref 70–99)
POTASSIUM BLD-SCNC: 4.2 MMOL/L (ref 3.4–5.3)
SODIUM SERPL-SCNC: 139 MMOL/L (ref 133–144)

## 2022-01-27 ENCOUNTER — TRANSFERRED RECORDS (OUTPATIENT)
Dept: HEALTH INFORMATION MANAGEMENT | Facility: CLINIC | Age: 82
End: 2022-01-27
Payer: COMMERCIAL

## 2022-02-11 ENCOUNTER — TRANSFERRED RECORDS (OUTPATIENT)
Dept: HEALTH INFORMATION MANAGEMENT | Facility: CLINIC | Age: 82
End: 2022-02-11
Payer: COMMERCIAL

## 2022-02-14 ENCOUNTER — LAB (OUTPATIENT)
Dept: LAB | Facility: CLINIC | Age: 82
End: 2022-02-14
Payer: COMMERCIAL

## 2022-02-14 ENCOUNTER — OFFICE VISIT (OUTPATIENT)
Dept: CARDIOLOGY | Facility: CLINIC | Age: 82
End: 2022-02-14
Attending: NURSE PRACTITIONER
Payer: COMMERCIAL

## 2022-02-14 VITALS
SYSTOLIC BLOOD PRESSURE: 137 MMHG | WEIGHT: 201.7 LBS | HEIGHT: 71 IN | HEART RATE: 62 BPM | DIASTOLIC BLOOD PRESSURE: 70 MMHG | BODY MASS INDEX: 28.24 KG/M2

## 2022-02-14 DIAGNOSIS — I50.30 HEART FAILURE WITH PRESERVED EJECTION FRACTION, NYHA CLASS II (H): ICD-10-CM

## 2022-02-14 DIAGNOSIS — I48.91 ATRIAL FIBRILLATION, UNSPECIFIED TYPE (H): Primary | ICD-10-CM

## 2022-02-14 LAB
ANION GAP SERPL CALCULATED.3IONS-SCNC: 5 MMOL/L (ref 3–14)
BUN SERPL-MCNC: 20 MG/DL (ref 7–30)
CALCIUM SERPL-MCNC: 10.9 MG/DL (ref 8.5–10.1)
CHLORIDE BLD-SCNC: 105 MMOL/L (ref 94–109)
CO2 SERPL-SCNC: 29 MMOL/L (ref 20–32)
CREAT SERPL-MCNC: 1.39 MG/DL (ref 0.66–1.25)
GFR SERPL CREATININE-BSD FRML MDRD: 51 ML/MIN/1.73M2
GLUCOSE BLD-MCNC: 139 MG/DL (ref 70–99)
POTASSIUM BLD-SCNC: 3.9 MMOL/L (ref 3.4–5.3)
SODIUM SERPL-SCNC: 139 MMOL/L (ref 133–144)

## 2022-02-14 PROCEDURE — 99215 OFFICE O/P EST HI 40 MIN: CPT | Performed by: INTERNAL MEDICINE

## 2022-02-14 PROCEDURE — 80048 BASIC METABOLIC PNL TOTAL CA: CPT

## 2022-02-14 PROCEDURE — 36415 COLL VENOUS BLD VENIPUNCTURE: CPT

## 2022-02-14 ASSESSMENT — MIFFLIN-ST. JEOR: SCORE: 1647.04

## 2022-02-14 NOTE — LETTER
2/14/2022    Andrew Elizalde MD  [address] [facility] [address]    RE: Pascual Esteswrocki       Dear Colleague,     I had the pleasure of seeing Pascual Perea in the [facility].  HPI and Plan:   Mr Perea is a very pleasant 81-year-old gentleman with the history of CAD with history of CABG in 1997, atrial fibrillation flutter with history of cardioversion and patient was on amiodarone in the past but this was discontinued because of abnormal liver enzymes on apixaban for stroke prophylaxis recently dose decreased due to elevated creatinine, recent hospitalization for decompensated congestive heart failure in the setting of diuretics being discontinued a few months prior to admission due to hypercalcemia and patient was found to have congestive heart failure with moderately decreased RV systolic function with severe pulmonary hypertension, underwent diuresis and lost around 13 pounds of weight.  Patient also has history of sleep apnea diagnosed about 10 years ago and was on CPAP for short duration of time but has not been using it for many years now.  Patient today is coming to cardiology clinic.  I'm seeing him for the second time.  My first time visit with him was in January 2020.  He had a stress echocardiogram done at that time that showed no evidence of distal ischemia.  He had a exercise nuclear stress test in June 2021 that showed a small area of nontransmural infarct in the apical segment.  During recent hospitalization echocardiogram showed normal LV function with flattened septum consistent with RV pressure and volume overload with severely dilated RV with moderate decreased RV systolic function with severe pulmonary hypertension with RVSP of 70 mmHg plus RA with mild aortic stenosis with mean clearance of 15 mils mercury with severe biatrial enlargement.  Compared to previous echocardiogram pulmonary hypertension and RV dysfunction were new.  Patient was also noted to have  cirrhotic appearing liver.  He recently had a visit with GI and had an MRI of the liver results of which are not available yet.  Patient tells me ever since her discharge she is feeling great.  He records his weight every day and his weight has been stable around 198 to 199 pounds at home.  Today in the clinic he is weighing 201 pounds.  He denies any exertional symptoms or chest discomfort shortness of breath dizziness presyncope syncope or orthopnea.  Kidney functions today shows creatinine of 1.39.  It was 1.42 a few weeks ago.  He is taking 10 mg of torsemide every other day.  Additionally he is on Lipitor 40 mg daily with LDL well controlled at 57.  He is not on any beta-blocker therapy and recent EKG revealed what appears to be a mix of fibrillation flutter with controlled ventricular rates.  He is on 2.5 mg twice daily of apixaban.  Prior to recent hospitalization he was on 5 mg twice daily of apixaban but due to worsening kidney function the dose was decreased.  He never had any bleeding issues on either 1 of these doses.  Patient is going to get the sleep medicine evaluation next month.  PFTs were normal in 2019.    Assessment plan  A pleasant 81-year-old gentleman with history of CAD with history of CABG in 927, atrial fibrillation with history of being on amiodarone in the past but that was discontinued because of abnormal liver enzymes not on any AV taylor blocking agents with ventricular rates well controlled without any clinical symptoms concerning for dizziness presyncope syncope on apixaban for stroke prophylaxis, recent hospitalization for decompensated congestive heart failure felt to be most likely right-sided congestive heart failure with possible left-sided congestive heart failure underwent diuresis was noted to have severe pulmonary hypertension with moderately decreased RV systolic function.  Overall after resumption of diuretic patient is doing quite well.  In fact today on examination he  appears essentially euvolemic.  I did not appreciate any significant JVP today.  Lungs were clear to auscultation no leg edema.  Remarkably on echocardiogram the E/A-wave ratio was in intermediate range.  I did discuss with patient his recent echocardiogram and hospitalization.  It is quite possible that there may have been increased pulmonary artery pressure in the setting of volume overload.  I recommend that we repeat an echocardiogram in next few months time preferably after patient sees sleep medicine as it is quite possible that he has untreated sleep apnea treatment of which should also help with pulmonary hypertension.  If on repeat check there is still significant pulmonary hypertension I would recommend right heart catheterization to objectively evaluate for pulmonary hypertension as well as left-sided filling pressure.  We also talk about appropriate dosing of apixaban with his age and kidney function at this time 5 mg twice daily of apixaban is recommended.  We also discussed option that creatinine can be fluctuating although in the last 2 evaluation it has been consistently below 1.5.  After long discussion weighing pros and cons of 2.5 versus 5 mg twice daily of apixaban we both made a mutually shared decision to go back to 5 mg twice daily of apixaban.  In case he noticed any bleeding issues he will let us know.  Regarding CAD he appears asymptomatic and is on high intensity statin and given stable CAD reasonable to have apixaban also provide secondary CAD prophylaxis.    1.  Recent hospitalization for congestive heart failure.  Appears to be mostly right-sided congestive heart failure may be some element of preserved ejection fraction congestive heart failure.  Left-sided filling pressures on E to E prime ratio were in intermediate range on recent echocardiogram.  Evidence of moderately decreased RV systolic function.  Underwent 13 pound of diuresis.  Now back at baseline weight of around 200 pounds.   Appears euvolemic on examination.  2.  Severe pulmonary hypertension.  In the setting of volume overload.  This was new finding on echocardiogram.  To be noted he is on chronic anticoagulation so less likely to be PE or chronic thromboembolic disease.  Has untreated sleep apnea which could be a factor.  Has an appointment with sleep medicine next month.  3.  Moderate decreased RV systolic function in the setting of severe pulmonary hypertension.  Clinically does not appear to be in decompensated congestive heart failure today with JVP appears normal no pedal edema.  4.  CAD with history of CABG in 1997.  Stress test in June 2021 showing small apical infarct.  Clinically no anginal symptoms.  On high intensity statin LDL well controlled.  5.  Chronic atrial fibrillation flutter with recent EKG showing fibrillation flutter, ventricular rates well controlled without any need for AV taylor blocking agents.  Was on amiodarone in the past but this was discontinued because of abnormal liver enzymes.  Chads 2 Vascor of at least 6.  On apixaban for stroke prophylaxis.  With current kidney function and age and body weight qualifies for 5 mg twice daily of apixaban which he was on till a few months ago.  Without any side effects when he was on 5 mg twice daily.  We'll increase it back as noted above to 5 mg twice daily.  6.  Obstructive sleep apnea.  Not on CPAP.  Has sleep medicine evaluation next month.  7.  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  Being followed by gastroenterology.  Only mild tricuspid regurgitation on recent echocardiogram.  8.  Mild aortic valve stenosis.  9.  Chronic kidney disease stage III.  Creatinine now less than 1.5 over last 2 measurements.    Recommendations  Increase apixaban to 5 mg twice daily  Continue diuretic in the current dose as he appears euvolemic  Follow-up in 2 to 3 months with repeat echocardiogram for reasons noted above.  If there is evidence of significant pulmonary hypertension  on repeat imaging would need right heart catheterization for further evaluation.    60 minutes of total time was spent including review of recent hospitalization.    Orders Placed This Encounter   Procedures     Follow-Up with Cardiology     Echocardiogram Complete       Orders Placed This Encounter   Medications     apixaban ANTICOAGULANT (ELIQUIS) 5 MG tablet     Sig: Take 1 tablet (5 mg) by mouth 2 times daily     Dispense:  180 tablet     Refill:  3       Medications Discontinued During This Encounter   Medication Reason     apixaban ANTICOAGULANT (ELIQUIS) 2.5 MG tablet          Encounter Diagnoses   Name Primary?     Heart failure with preserved ejection fraction, NYHA class II (H)      Atrial fibrillation, unspecified type (H) Yes       CURRENT MEDICATIONS:  Current Outpatient Medications   Medication Sig Dispense Refill     apixaban ANTICOAGULANT (ELIQUIS) 5 MG tablet Take 1 tablet (5 mg) by mouth 2 times daily 180 tablet 3     atorvastatin (LIPITOR) 40 MG tablet TAKE 1 TABLET (40 MG) BY MOUTH DAILY 90 tablet 3     azelastine (ASTELIN) 0.1 % nasal spray USE 2 SPRAYS INTO BOTH NOSTRILS TWO TIMES A DAY 90 mL 3     blood glucose (NO BRAND SPECIFIED) test strip Use to test blood sugar 4 times daily or as directed. 400 each 1     blood glucose calibration (ACCU-CHEK NORMAN) solution USE AS DIRECTED 1 each 0     Continuous Blood Gluc  (FREESTYLE DELFINA 2 READER) CHRYSTAL 1 Device continuous 1 each 1     Continuous Blood Gluc Sensor (FREESTYLE DELFINA 2 SENSOR) MISC 1 Device every 14 days 6 each 3     fenofibrate micronized (LOFIBRA) 67 MG capsule Take 1 capsule (67 mg) by mouth every morning (before breakfast) 30 capsule 11     glucose 4 g CHEW Take 1 tablet by mouth every hour as needed for low blood sugar        insulin aspart (NOVOLOG FLEXPEN) 100 UNIT/ML pen INJECT SUBCUTANEOUSLY THREE TIMES A DAY (BEFORE MEALS) 3 UNITS PER CARB CHOICE (15 GM) APPROX 10-15 UNITS/ MEAL 45 mL 1     insulin glargine (LANTUS PEN)  100 UNIT/ML pen Inject 66-68 Units Subcutaneous At Bedtime 75 mL 3     levothyroxine (SYNTHROID/LEVOTHROID) 100 MCG tablet TAKE ONE TABLET BY MOUTH ONCE DAILY 90 tablet 3     nitroGLYcerin (NITROSTAT) 0.4 MG sublingual tablet Place 1 tablet (0.4 mg) under the tongue every 5 minutes as needed for chest pain 25 tablet 1     NOVOFINE AUTOCOVER PEN NEEDLE 30G X 8 MM miscellaneous USE AS DIRECTED FOUR TIMES A  each 2     torsemide (DEMADEX) 10 MG tablet Take 1 tablet (10 mg) by mouth daily 90 tablet 3       ALLERGIES     Allergies   Allergen Reactions     Omeprazole      diarrhea     Pantoprazole      diarrhea       PAST MEDICAL HISTORY:  Past Medical History:   Diagnosis Date     Adhesive capsulitis of shoulder      Anemia 3/10/2014     Anemia, unspecified      Antiplatelet or antithrombotic long-term use      Arrhythmia     Atrial fib/flutter     CAD (coronary artery disease)      History of cardioversion 04/24/2018    a single synchronized shock of 120 joules restored normal sinus rhythm     History of cardioversion 02/13/2019    single shock , 200 joules successul in restoring NSR     Hyperlipidemia LDL goal <70 5/26/2011     Hypertension      Hypothyroidism      Impotence of organic origin      Laceration of finger  June 2010     left thumb s/p sutures     Numbness and tingling     diabetic neuropathy bilateral feet     BRANDON (obstructive sleep apnea)     intolerant of CPAP, uses it occasionally.  Using mandibular device occasionally     Osteopenia      Pulmonary hypertension (H) 4/6/2012     Sensorineural hearing loss, unspecified      Stented coronary artery 1997    CABG      Testosterone deficiency      Type 2 diabetes mellitus without complication  (goal A1C<8) 10/24/2015     Typical atrial flutter (H) 4/18/16     Unspecified hypothyroidism      Vitamin D deficiency 9/3/2011       PAST SURGICAL HISTORY:  Past Surgical History:   Procedure Laterality Date     ANESTHESIA CARDIOVERSION N/A 2/13/2019     Procedure: ANESTHESIA CARDIOVERSION;  Surgeon: GENERIC ANESTHESIA PROVIDER;  Location:  OR     ANESTHESIA CARDIOVERSION N/A 5/22/2019    Procedure: ANESTHESIA, FOR CARDIOVERSION;  Surgeon: GENERIC ANESTHESIA PROVIDER;  Location:  OR     CARDIAC SURGERY      bypass in 1997     CARDIOVERSION  04/24/2018     COLONOSCOPY       CORONARY ANGIOGRAPHY ADULT ORDER      4/2/2012     CORONARY ARTERY BYPASS  1997    [facility] to LAD     ENDOSCOPIC ULTRASOUND UPPER GASTROINTESTINAL TRACT (GI) N/A 3/14/2019    Procedure: ENDOSCOPIC ULTRASOUND OF UPPER GASTROINTESTINAL TRACT;  Surgeon: Ju Torres MD;  Location:  OR     EXCISE MASS HEAD Left 8/18/2016    Procedure: EXCISE MASS HEAD;  Surgeon: Ivan Hernandez MD;  Location: [facility]     HEART CATH, ANGIOPLASTY  4/2012     LAPAROSCOPIC CHOLECYSTECTOMY N/A 3/17/2019    Procedure: LAPAROSCOPIC CHOLECYSTECTOMY;  Surgeon: Janel Paz MD;  Location:  OR     PHACOEMULSIFICATION CLEAR CORNEA WITH STANDARD INTRAOCULAR LENS IMPLANT Left 6/8/2015    Procedure: PHACOEMULSIFICATION CLEAR CORNEA WITH STANDARD INTRAOCULAR LENS IMPLANT;  Surgeon: Nixon Farnsworth MD;  Location:  [facility]     PHACOEMULSIFICATION CLEAR CORNEA WITH STANDARD INTRAOCULAR LENS IMPLANT Right 6/22/2015    Procedure: PHACOEMULSIFICATION CLEAR CORNEA WITH STANDARD INTRAOCULAR LENS IMPLANT;  Surgeon: Nixon Farnsworth MD;  Location:  [facility]     SEPTOPLASTY, TURBINOPLASTY, COMBINED Bilateral 8/18/2016    Procedure: COMBINED SEPTOPLASTY, TURBINOPLASTY;  Surgeon: Ivan Hernandez MD;  Location:  [facility]     ZZC NONSPECIFIC PROCEDURE  1995    CABG ([facility])     [facility] NONSPECIFIC PROCEDURE  8/01    stress echo       FAMILY HISTORY:  Family History   Problem Relation Age of Onset     Genitourinary Problems Father         d: age 89; ARF     Hypertension Father      Diabetes Mother         d: age 68, CAD     Heart Disease Mother         MI     Myocardial Infarction Mother       Cardiovascular Brother         d:  age 31; CAD     Heart Disease Brother         age 31, MI     Diabetes Sister         b:  1939; DM2       SOCIAL HISTORY:  Social History     Socioeconomic History     Marital status:      Spouse name: Not on file     Number of children: Not on file     Years of education: Not on file     Highest education level: Not on file   Occupational History     Not on file   Tobacco Use     Smoking status: Former Smoker     Packs/day: 1.00     Years: 6.00     Pack years: 6.00     Types: Cigarettes     Start date:      Quit date: 1964     Years since quittin.6     Smokeless tobacco: Never Used   Substance and Sexual Activity     Alcohol use: Yes     Comment: couple drinks a year     Drug use: No     Sexual activity: Yes     Partners: Female   Other Topics Concern     Parent/sibling w/ CABG, MI or angioplasty before 65F 55M? Not Asked      Service Not Asked     Blood Transfusions Not Asked     Caffeine Concern Yes     Comment: 2 cups coffee a day     Occupational Exposure Not Asked     Hobby Hazards Not Asked     Sleep Concern Yes     Comment: sleep apnea, wears cpap off and on     Stress Concern No     Weight Concern No     Special Diet Yes     Comment: diabetic diet      Back Care Not Asked     Exercise No     Comment: occ walk the mall     Bike Helmet Not Asked     Seat Belt Yes     Self-Exams Not Asked   Social History Narrative     Not on file     Social Determinants of Health     Financial Resource Strain: Not on file   Food Insecurity: Not on file   Transportation Needs: Not on file   Physical Activity: Not on file   Stress: Not on file   Social Connections: Not on file   Intimate Partner Violence: Not on file   Housing Stability: Not on file       Review of Systems:  Skin:  Negative       Eyes:  Negative      ENT:  Negative      Respiratory:    Longstanding dyspnea on exertion and back to baseline much better than what he was feeling prior to recent  "hospitalization in December 2021.  Cardiovascular:  chest pain;Negative;palpitations;edema;dizziness;lightheadedness      Gastroenterology: Positive for reflux    Genitourinary:  Negative      Musculoskeletal:  Positive for neck pain;back pain    Neurologic:  Negative headaches    Psychiatric:  Negative      Heme/Lymph/Imm:  Positive for allergies    Endocrine:  Positive for thyroid disorder;diabetes      Physical Exam:  Vitals: /70 (BP Location: Left arm, Patient Position: Sitting)   Pulse 62   Ht 1.803 m (5' 11\")   Wt 91.5 kg (201 lb 11.2 oz)   BMI 28.13 kg/m      General patient appears comfortable  Neck normal JVP, negative hepatojugular reflux  Cardiovascular system S1-S2 normal, slightly irregular no murmur rub or gallop  Respiratory system clear to auscultation  Extremities no edema  Neurological alert, oriented    Thank you for allowing me to participate in the care of your patient.      Sincerely,     Phoenix Adams MD     [facility]  cc:   Valentine Boyd, APRN CNP  [address]  [place] [address]        "

## 2022-02-14 NOTE — PROGRESS NOTES
HPI and Plan:   Mr Perea is a very pleasant 81-year-old gentleman with the history of CAD with history of CABG in 1997, atrial fibrillation flutter with history of cardioversion and patient was on amiodarone in the past but this was discontinued because of abnormal liver enzymes on apixaban for stroke prophylaxis recently dose decreased due to elevated creatinine, recent hospitalization for decompensated congestive heart failure in the setting of diuretics being discontinued a few months prior to admission due to hypercalcemia and patient was found to have congestive heart failure with moderately decreased RV systolic function with severe pulmonary hypertension, underwent diuresis and lost around 13 pounds of weight.  Patient also has history of sleep apnea diagnosed about 10 years ago and was on CPAP for short duration of time but has not been using it for many years now.  Patient today is coming to cardiology clinic.  I'm seeing him for the second time.  My first time visit with him was in January 2020.  He had a stress echocardiogram done at that time that showed no evidence of distal ischemia.  He had a exercise nuclear stress test in June 2021 that showed a small area of nontransmural infarct in the apical segment.  During recent hospitalization echocardiogram showed normal LV function with flattened septum consistent with RV pressure and volume overload with severely dilated RV with moderate decreased RV systolic function with severe pulmonary hypertension with RVSP of 70 mmHg plus RA with mild aortic stenosis with mean clearance of 15 mils mercury with severe biatrial enlargement.  Compared to previous echocardiogram pulmonary hypertension and RV dysfunction were new.  Patient was also noted to have cirrhotic appearing liver.  He recently had a visit with GI and had an MRI of the liver results of which are not available yet.  Patient tells me ever since her discharge she is feeling great.  He records his  weight every day and his weight has been stable around 198 to 199 pounds at home.  Today in the clinic he is weighing 201 pounds.  He denies any exertional symptoms or chest discomfort shortness of breath dizziness presyncope syncope or orthopnea.  Kidney functions today shows creatinine of 1.39.  It was 1.42 a few weeks ago.  He is taking 10 mg of torsemide every other day.  Additionally he is on Lipitor 40 mg daily with LDL well controlled at 57.  He is not on any beta-blocker therapy and recent EKG revealed what appears to be a mix of fibrillation flutter with controlled ventricular rates.  He is on 2.5 mg twice daily of apixaban.  Prior to recent hospitalization he was on 5 mg twice daily of apixaban but due to worsening kidney function the dose was decreased.  He never had any bleeding issues on either 1 of these doses.  Patient is going to get the sleep medicine evaluation next month.  PFTs were normal in 2019.    Assessment plan  A pleasant 81-year-old gentleman with history of CAD with history of CABG in 927, atrial fibrillation with history of being on amiodarone in the past but that was discontinued because of abnormal liver enzymes not on any AV taylor blocking agents with ventricular rates well controlled without any clinical symptoms concerning for dizziness presyncope syncope on apixaban for stroke prophylaxis, recent hospitalization for decompensated congestive heart failure felt to be most likely right-sided congestive heart failure with possible left-sided congestive heart failure underwent diuresis was noted to have severe pulmonary hypertension with moderately decreased RV systolic function.  Overall after resumption of diuretic patient is doing quite well.  In fact today on examination he appears essentially euvolemic.  I did not appreciate any significant JVP today.  Lungs were clear to auscultation no leg edema.  Remarkably on echocardiogram the E/A-wave ratio was in intermediate range.  I did  discuss with patient his recent echocardiogram and hospitalization.  It is quite possible that there may have been increased pulmonary artery pressure in the setting of volume overload.  I recommend that we repeat an echocardiogram in next few months time preferably after patient sees sleep medicine as it is quite possible that he has untreated sleep apnea treatment of which should also help with pulmonary hypertension.  If on repeat check there is still significant pulmonary hypertension I would recommend right heart catheterization to objectively evaluate for pulmonary hypertension as well as left-sided filling pressure.  We also talk about appropriate dosing of apixaban with his age and kidney function at this time 5 mg twice daily of apixaban is recommended.  We also discussed option that creatinine can be fluctuating although in the last 2 evaluation it has been consistently below 1.5.  After long discussion weighing pros and cons of 2.5 versus 5 mg twice daily of apixaban we both made a mutually shared decision to go back to 5 mg twice daily of apixaban.  In case he noticed any bleeding issues he will let us know.  Regarding CAD he appears asymptomatic and is on high intensity statin and given stable CAD reasonable to have apixaban also provide secondary CAD prophylaxis.    1.  Recent hospitalization for congestive heart failure.  Appears to be mostly right-sided congestive heart failure may be some element of preserved ejection fraction congestive heart failure.  Left-sided filling pressures on E to E prime ratio were in intermediate range on recent echocardiogram.  Evidence of moderately decreased RV systolic function.  Underwent 13 pound of diuresis.  Now back at baseline weight of around 200 pounds.  Appears euvolemic on examination.  2.  Severe pulmonary hypertension.  In the setting of volume overload.  This was new finding on echocardiogram.  To be noted he is on chronic anticoagulation so less likely  to be PE or chronic thromboembolic disease.  Has untreated sleep apnea which could be a factor.  Has an appointment with sleep medicine next month.  3.  Moderate decreased RV systolic function in the setting of severe pulmonary hypertension.  Clinically does not appear to be in decompensated congestive heart failure today with JVP appears normal no pedal edema.  4.  CAD with history of CABG in 1997.  Stress test in June 2021 showing small apical infarct.  Clinically no anginal symptoms.  On high intensity statin LDL well controlled.  5.  Chronic atrial fibrillation flutter with recent EKG showing fibrillation flutter, ventricular rates well controlled without any need for AV taylor blocking agents.  Was on amiodarone in the past but this was discontinued because of abnormal liver enzymes.  Chads 2 Vascor of at least 6.  On apixaban for stroke prophylaxis.  With current kidney function and age and body weight qualifies for 5 mg twice daily of apixaban which he was on till a few months ago.  Without any side effects when he was on 5 mg twice daily.  We'll increase it back as noted above to 5 mg twice daily.  6.  Obstructive sleep apnea.  Not on CPAP.  Has sleep medicine evaluation next month.  7.  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  Being followed by gastroenterology.  Only mild tricuspid regurgitation on recent echocardiogram.  8.  Mild aortic valve stenosis.  9.  Chronic kidney disease stage III.  Creatinine now less than 1.5 over last 2 measurements.    Recommendations  Increase apixaban to 5 mg twice daily  Continue diuretic in the current dose as he appears euvolemic  Follow-up in 2 to 3 months with repeat echocardiogram for reasons noted above.  If there is evidence of significant pulmonary hypertension on repeat imaging would need right heart catheterization for further evaluation.    60 minutes of total time was spent including review of recent hospitalization.    Orders Placed This Encounter   Procedures      Follow-Up with Cardiology     Echocardiogram Complete       Orders Placed This Encounter   Medications     apixaban ANTICOAGULANT (ELIQUIS) 5 MG tablet     Sig: Take 1 tablet (5 mg) by mouth 2 times daily     Dispense:  180 tablet     Refill:  3       Medications Discontinued During This Encounter   Medication Reason     apixaban ANTICOAGULANT (ELIQUIS) 2.5 MG tablet          Encounter Diagnoses   Name Primary?     Heart failure with preserved ejection fraction, NYHA class II (H)      Atrial fibrillation, unspecified type (H) Yes       CURRENT MEDICATIONS:  Current Outpatient Medications   Medication Sig Dispense Refill     apixaban ANTICOAGULANT (ELIQUIS) 5 MG tablet Take 1 tablet (5 mg) by mouth 2 times daily 180 tablet 3     atorvastatin (LIPITOR) 40 MG tablet TAKE 1 TABLET (40 MG) BY MOUTH DAILY 90 tablet 3     azelastine (ASTELIN) 0.1 % nasal spray USE 2 SPRAYS INTO BOTH NOSTRILS TWO TIMES A DAY 90 mL 3     blood glucose (NO BRAND SPECIFIED) test strip Use to test blood sugar 4 times daily or as directed. 400 each 1     blood glucose calibration (ACCU-CHEK NORMAN) solution USE AS DIRECTED 1 each 0     Continuous Blood Gluc  (FREESTYLE DELFINA 2 READER) CHRYSTAL 1 Device continuous 1 each 1     Continuous Blood Gluc Sensor (FREESTYLE DELFINA 2 SENSOR) MISC 1 Device every 14 days 6 each 3     fenofibrate micronized (LOFIBRA) 67 MG capsule Take 1 capsule (67 mg) by mouth every morning (before breakfast) 30 capsule 11     glucose 4 g CHEW Take 1 tablet by mouth every hour as needed for low blood sugar        insulin aspart (NOVOLOG FLEXPEN) 100 UNIT/ML pen INJECT SUBCUTANEOUSLY THREE TIMES A DAY (BEFORE MEALS) 3 UNITS PER CARB CHOICE (15 GM) APPROX 10-15 UNITS/ MEAL 45 mL 1     insulin glargine (LANTUS PEN) 100 UNIT/ML pen Inject 66-68 Units Subcutaneous At Bedtime 75 mL 3     levothyroxine (SYNTHROID/LEVOTHROID) 100 MCG tablet TAKE ONE TABLET BY MOUTH ONCE DAILY 90 tablet 3     nitroGLYcerin (NITROSTAT) 0.4 MG  sublingual tablet Place 1 tablet (0.4 mg) under the tongue every 5 minutes as needed for chest pain 25 tablet 1     NOVOFINE AUTOCOVER PEN NEEDLE 30G X 8 MM miscellaneous USE AS DIRECTED FOUR TIMES A  each 2     torsemide (DEMADEX) 10 MG tablet Take 1 tablet (10 mg) by mouth daily 90 tablet 3       ALLERGIES     Allergies   Allergen Reactions     Omeprazole      diarrhea     Pantoprazole      diarrhea       PAST MEDICAL HISTORY:  Past Medical History:   Diagnosis Date     Adhesive capsulitis of shoulder      Anemia 3/10/2014     Anemia, unspecified      Antiplatelet or antithrombotic long-term use      Arrhythmia     Atrial fib/flutter     CAD (coronary artery disease)      History of cardioversion 04/24/2018    a single synchronized shock of 120 joules restored normal sinus rhythm     History of cardioversion 02/13/2019    single shock , 200 joules successul in restoring NSR     Hyperlipidemia LDL goal <70 5/26/2011     Hypertension      Hypothyroidism      Impotence of organic origin      Laceration of finger  June 2010     left thumb s/p sutures     Numbness and tingling     diabetic neuropathy bilateral feet     BRANDON (obstructive sleep apnea)     intolerant of CPAP, uses it occasionally.  Using mandibular device occasionally     Osteopenia      Pulmonary hypertension (H) 4/6/2012     Sensorineural hearing loss, unspecified      Stented coronary artery 1997    CABG      Testosterone deficiency      Type 2 diabetes mellitus without complication  (goal A1C<8) 10/24/2015     Typical atrial flutter (H) 4/18/16     Unspecified hypothyroidism      Vitamin D deficiency 9/3/2011       PAST SURGICAL HISTORY:  Past Surgical History:   Procedure Laterality Date     ANESTHESIA CARDIOVERSION N/A 2/13/2019    Procedure: ANESTHESIA CARDIOVERSION;  Surgeon: GENERIC ANESTHESIA PROVIDER;  Location:  OR     ANESTHESIA CARDIOVERSION N/A 5/22/2019    Procedure: ANESTHESIA, FOR CARDIOVERSION;  Surgeon: GENERIC ANESTHESIA  PROVIDER;  Location:  OR     CARDIAC SURGERY      bypass in 1997     CARDIOVERSION  04/24/2018     COLONOSCOPY       CORONARY ANGIOGRAPHY ADULT ORDER      4/2/2012     CORONARY ARTERY BYPASS  1997    [facility]     ENDOSCOPIC ULTRASOUND UPPER GASTROINTESTINAL TRACT (GI) N/A 3/14/2019    Procedure: ENDOSCOPIC ULTRASOUND OF UPPER GASTROINTESTINAL TRACT;  Surgeon: Ju Torres MD;  Location:  OR     EXCISE MASS HEAD Left 8/18/2016    Procedure: EXCISE MASS HEAD;  Surgeon: Ivan Hernandez MD;  Location: [facility]     HEART CATH, ANGIOPLASTY  4/2012     LAPAROSCOPIC CHOLECYSTECTOMY N/A 3/17/2019    Procedure: LAPAROSCOPIC CHOLECYSTECTOMY;  Surgeon: Janel Paz MD;  Location:  OR     PHACOEMULSIFICATION CLEAR CORNEA WITH STANDARD INTRAOCULAR LENS IMPLANT Left 6/8/2015    Procedure: PHACOEMULSIFICATION CLEAR CORNEA WITH STANDARD INTRAOCULAR LENS IMPLANT;  Surgeon: Nixon Farnsworth MD;  Location:  [facility]     PHACOEMULSIFICATION CLEAR CORNEA WITH STANDARD INTRAOCULAR LENS IMPLANT Right 6/22/2015    Procedure: PHACOEMULSIFICATION CLEAR CORNEA WITH STANDARD INTRAOCULAR LENS IMPLANT;  Surgeon: Nixon Farnsworth MD;  Location:  [facility]     SEPTOPLASTY, TURBINOPLASTY, COMBINED Bilateral 8/18/2016    Procedure: COMBINED SEPTOPLASTY, TURBINOPLASTY;  Surgeon: Ivan Hernandez MD;  Location: [facility]     ZZ NONSPECIFIC PROCEDURE  1995    CABG ([facility])     [facility] NONSPECIFIC PROCEDURE  8/01    stress echo       FAMILY HISTORY:  Family History   Problem Relation Age of Onset     Genitourinary Problems Father         d: age 89; ARF     Hypertension Father      Diabetes Mother         d: age 68, CAD     Heart Disease Mother         MI     Myocardial Infarction Mother      Cardiovascular Brother         d:  age 31; CAD     Heart Disease Brother         age 31, MI     Diabetes Sister         b:  1939; DM2       SOCIAL HISTORY:  Social History     Socioeconomic History     Marital status:       Spouse name: Not on file     Number of children: Not on file     Years of education: Not on file     Highest education level: Not on file   Occupational History     Not on file   Tobacco Use     Smoking status: Former Smoker     Packs/day: 1.00     Years: 6.00     Pack years: 6.00     Types: Cigarettes     Start date:      Quit date: 1964     Years since quittin.6     Smokeless tobacco: Never Used   Substance and Sexual Activity     Alcohol use: Yes     Comment: couple drinks a year     Drug use: No     Sexual activity: Yes     Partners: Female   Other Topics Concern     Parent/sibling w/ CABG, MI or angioplasty before 65F 55M? Not Asked      Service Not Asked     Blood Transfusions Not Asked     Caffeine Concern Yes     Comment: 2 cups coffee a day     Occupational Exposure Not Asked     Hobby Hazards Not Asked     Sleep Concern Yes     Comment: sleep apnea, wears cpap off and on     Stress Concern No     Weight Concern No     Special Diet Yes     Comment: diabetic diet      Back Care Not Asked     Exercise No     Comment: occ walk the mall     Bike Helmet Not Asked     Seat Belt Yes     Self-Exams Not Asked   Social History Narrative     Not on file     Social Determinants of Health     Financial Resource Strain: Not on file   Food Insecurity: Not on file   Transportation Needs: Not on file   Physical Activity: Not on file   Stress: Not on file   Social Connections: Not on file   Intimate Partner Violence: Not on file   Housing Stability: Not on file       Review of Systems:  Skin:  Negative       Eyes:  Negative      ENT:  Negative      Respiratory:    Longstanding dyspnea on exertion and back to baseline much better than what he was feeling prior to recent hospitalization in 2021.  Cardiovascular:  chest pain;Negative;palpitations;edema;dizziness;lightheadedness      Gastroenterology: Positive for reflux    Genitourinary:  Negative      Musculoskeletal:  Positive for  "neck pain;back pain    Neurologic:  Negative headaches    Psychiatric:  Negative      Heme/Lymph/Imm:  Positive for allergies    Endocrine:  Positive for thyroid disorder;diabetes      Physical Exam:  Vitals: /70 (BP Location: Left arm, Patient Position: Sitting)   Pulse 62   Ht 1.803 m (5' 11\")   Wt 91.5 kg (201 lb 11.2 oz)   BMI 28.13 kg/m      General patient appears comfortable  Neck normal JVP, negative hepatojugular reflux  Cardiovascular system S1-S2 normal, slightly irregular no murmur rub or gallop  Respiratory system clear to auscultation  Extremities no edema  Neurological alert, oriented      CC  Valentine Boyd, APRN CNP  [address]  [place] [address]                  "

## 2022-02-16 ENCOUNTER — TELEPHONE (OUTPATIENT)
Dept: CARDIOLOGY | Facility: CLINIC | Age: 82
End: 2022-02-16
Payer: COMMERCIAL

## 2022-02-16 NOTE — TELEPHONE ENCOUNTER
[facility] Call Center    Phone Message    May a detailed message be left on voicemail: yes     Reason for Call: Other: patient Sleep study Doctor left and had to est care with a new. Does Dr. Adams wants to continue with original plan or should the plan change due to a new doctor      Action Taken: Other: clinical pool    Travel Screening: Not Applicable

## 2022-02-17 NOTE — TELEPHONE ENCOUNTER
Dr. Adams:  You saw this gentleman this past week-has severe PAH with untreated BRANDON.    You have an echo ordered for May to re evaluate his pulmonary pressures after a 3 month period of BRANDON treatment.    However, the soonest he can get a sleep eval is not until May 5 The sleep doc you recommended is no longer with [facility]).    I would assume that we should push the echo and return visit out till at least August?    Thanks, Sam

## 2022-02-17 NOTE — TELEPHONE ENCOUNTER
I called patient to inform him that Dr. Adams said that we should push the echo back to late July or early August since he can't get in to see sleep medicine until early May. Patient is fine with this.    I told him that I will message scheduling to cancel the May echo and follow up and to reschedule both for July-August. I told him that scheduling will call him about those appointments.

## 2022-02-19 ENCOUNTER — APPOINTMENT (OUTPATIENT)
Dept: GENERAL RADIOLOGY | Facility: CLINIC | Age: 82
End: 2022-02-19
Attending: EMERGENCY MEDICINE
Payer: COMMERCIAL

## 2022-02-19 ENCOUNTER — HOSPITAL ENCOUNTER (EMERGENCY)
Facility: CLINIC | Age: 82
Discharge: HOME | End: 2022-02-19
Attending: EMERGENCY MEDICINE | Admitting: EMERGENCY MEDICINE
Payer: COMMERCIAL

## 2022-02-19 VITALS
HEIGHT: 71 IN | HEART RATE: 59 BPM | SYSTOLIC BLOOD PRESSURE: 145 MMHG | DIASTOLIC BLOOD PRESSURE: 79 MMHG | RESPIRATION RATE: 17 BRPM | BODY MASS INDEX: 28 KG/M2 | OXYGEN SATURATION: 98 % | WEIGHT: 200 LBS | TEMPERATURE: 97.6 F

## 2022-02-19 DIAGNOSIS — R00.2 PALPITATIONS: ICD-10-CM

## 2022-02-19 DIAGNOSIS — R07.9 CHEST PAIN, UNSPECIFIED TYPE: ICD-10-CM

## 2022-02-19 LAB
ALBUMIN SERPL-MCNC: 4 G/DL (ref 3.4–5)
ALP SERPL-CCNC: 86 U/L (ref 40–150)
ALT SERPL W P-5'-P-CCNC: 37 U/L (ref 0–70)
ANION GAP SERPL CALCULATED.3IONS-SCNC: 2 MMOL/L (ref 3–14)
AST SERPL W P-5'-P-CCNC: 25 U/L (ref 0–45)
ATRIAL RATE - MUSE: 187 BPM
BASOPHILS # BLD AUTO: 0 10E3/UL (ref 0–0.2)
BASOPHILS NFR BLD AUTO: 1 %
BILIRUB SERPL-MCNC: 0.7 MG/DL (ref 0.2–1.3)
BUN SERPL-MCNC: 16 MG/DL (ref 7–30)
CALCIUM SERPL-MCNC: 10.6 MG/DL (ref 8.5–10.1)
CHLORIDE BLD-SCNC: 107 MMOL/L (ref 94–109)
CO2 SERPL-SCNC: 31 MMOL/L (ref 20–32)
CREAT SERPL-MCNC: 1.35 MG/DL (ref 0.66–1.25)
DIASTOLIC BLOOD PRESSURE - MUSE: NORMAL MMHG
EOSINOPHIL # BLD AUTO: 0.2 10E3/UL (ref 0–0.7)
EOSINOPHIL NFR BLD AUTO: 5 %
ERYTHROCYTE [DISTWIDTH] IN BLOOD BY AUTOMATED COUNT: 15.6 % (ref 10–15)
GFR SERPL CREATININE-BSD FRML MDRD: 53 ML/MIN/1.73M2
GLUCOSE BLD-MCNC: 136 MG/DL (ref 70–99)
HCT VFR BLD AUTO: 36.2 % (ref 40–53)
HGB BLD-MCNC: 11.5 G/DL (ref 13.3–17.7)
HOLD SPECIMEN: NORMAL
HOLD SPECIMEN: NORMAL
IMM GRANULOCYTES # BLD: 0 10E3/UL
IMM GRANULOCYTES NFR BLD: 0 %
INTERPRETATION ECG - MUSE: NORMAL
LYMPHOCYTES # BLD AUTO: 0.9 10E3/UL (ref 0.8–5.3)
LYMPHOCYTES NFR BLD AUTO: 24 %
MCH RBC QN AUTO: 27.1 PG (ref 26.5–33)
MCHC RBC AUTO-ENTMCNC: 31.8 G/DL (ref 31.5–36.5)
MCV RBC AUTO: 85 FL (ref 78–100)
MONOCYTES # BLD AUTO: 0.3 10E3/UL (ref 0–1.3)
MONOCYTES NFR BLD AUTO: 8 %
NEUTROPHILS # BLD AUTO: 2.4 10E3/UL (ref 1.6–8.3)
NEUTROPHILS NFR BLD AUTO: 62 %
NRBC # BLD AUTO: 0 10E3/UL
NRBC BLD AUTO-RTO: 0 /100
NT-PROBNP SERPL-MCNC: 1449 PG/ML (ref 0–1800)
P AXIS - MUSE: NORMAL DEGREES
PLATELET # BLD AUTO: 185 10E3/UL (ref 150–450)
POTASSIUM BLD-SCNC: 3.7 MMOL/L (ref 3.4–5.3)
PR INTERVAL - MUSE: NORMAL MS
PROT SERPL-MCNC: 7.3 G/DL (ref 6.8–8.8)
QRS DURATION - MUSE: 126 MS
QT - MUSE: 484 MS
QTC - MUSE: 428 MS
R AXIS - MUSE: 62 DEGREES
RBC # BLD AUTO: 4.25 10E6/UL (ref 4.4–5.9)
SODIUM SERPL-SCNC: 140 MMOL/L (ref 133–144)
SYSTOLIC BLOOD PRESSURE - MUSE: NORMAL MMHG
T AXIS - MUSE: 197 DEGREES
TROPONIN I SERPL HS-MCNC: 32 NG/L
TROPONIN I SERPL HS-MCNC: 37 NG/L
VENTRICULAR RATE- MUSE: 47 BPM
WBC # BLD AUTO: 3.8 10E3/UL (ref 4–11)

## 2022-02-19 PROCEDURE — 80053 COMPREHEN METABOLIC PANEL: CPT | Performed by: EMERGENCY MEDICINE

## 2022-02-19 PROCEDURE — 71046 X-RAY EXAM CHEST 2 VIEWS: CPT

## 2022-02-19 PROCEDURE — 36415 COLL VENOUS BLD VENIPUNCTURE: CPT | Performed by: EMERGENCY MEDICINE

## 2022-02-19 PROCEDURE — 83880 ASSAY OF NATRIURETIC PEPTIDE: CPT | Performed by: EMERGENCY MEDICINE

## 2022-02-19 PROCEDURE — 84484 ASSAY OF TROPONIN QUANT: CPT | Mod: 91 | Performed by: EMERGENCY MEDICINE

## 2022-02-19 PROCEDURE — 85025 COMPLETE CBC W/AUTO DIFF WBC: CPT | Performed by: EMERGENCY MEDICINE

## 2022-02-19 PROCEDURE — 84484 ASSAY OF TROPONIN QUANT: CPT | Performed by: EMERGENCY MEDICINE

## 2022-02-19 PROCEDURE — 93005 ELECTROCARDIOGRAM TRACING: CPT

## 2022-02-19 PROCEDURE — 82040 ASSAY OF SERUM ALBUMIN: CPT | Performed by: EMERGENCY MEDICINE

## 2022-02-19 PROCEDURE — 99285 EMERGENCY DEPT VISIT HI MDM: CPT | Mod: 25

## 2022-02-19 ASSESSMENT — ENCOUNTER SYMPTOMS
PALPITATIONS: 1
LIGHT-HEADEDNESS: 0
CHEST TIGHTNESS: 1

## 2022-02-19 NOTE — ED PROVIDER NOTES
"  History   Chief Complaint:  Palpitations       The history is provided by the patient.      Pascual Perea is a 81 year old male on Eliquis with history of A fib, A flutter, and heart failure s/p CABG who presents with palpitations. The patient was measuring his BP this morning which red 165/140. During this time a small light indicating A fib also came on his monitor, prompting patient to drive himself to the ED. Here he endorses some intermittent chest tightness as well as \"a funny feeling in my chest\" but denies any lightheadedness. Patient has been in atrial fibrillation before with successful cardioversion in the past. Patient was recently seen in the ED in late December 2021 for heart failure but does not feel that he is to this point yet today.      From cardiology clinic visit with Dr Adams (5 days ago):  1.  Recent hospitalization for congestive heart failure.  Appears to be mostly right-sided congestive heart failure may be some element of preserved ejection fraction congestive heart failure.  Left-sided filling pressures on E to E prime ratio were in intermediate range on recent echocardiogram.  Evidence of moderately decreased RV systolic function.  Underwent 13 pound of diuresis.  Now back at baseline weight of around 200 pounds.  Appears euvolemic on examination.  2.  Severe pulmonary hypertension.  In the setting of volume overload.  This was new finding on echocardiogram.  To be noted he is on chronic anticoagulation so less likely to be PE or chronic thromboembolic disease.  Has untreated sleep apnea which could be a factor.  Has an appointment with sleep medicine next month.  3.  Moderate decreased RV systolic function in the setting of severe pulmonary hypertension.  Clinically does not appear to be in decompensated congestive heart failure today with JVP appears normal no pedal edema.  4.  CAD with history of CABG in 1997.  Stress test in June 2021 showing small apical infarct.  Clinically no " anginal symptoms.  On high intensity statin LDL well controlled.  5.  Chronic atrial fibrillation flutter with recent EKG showing fibrillation flutter, ventricular rates well controlled without any need for AV taylor blocking agents.  Was on amiodarone in the past but this was discontinued because of abnormal liver enzymes.  Chads 2 Vascor of at least 6.  On apixaban for stroke prophylaxis.  With current kidney function and age and body weight qualifies for 5 mg twice daily of apixaban which he was on till a few months ago.  Without any side effects when he was on 5 mg twice daily.  We'll increase it back as noted above to 5 mg twice daily.  6.  Obstructive sleep apnea.  Not on CPAP.  Has sleep medicine evaluation next month.  7.  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  Being followed by gastroenterology.  Only mild tricuspid regurgitation on recent echocardiogram.  8.  Mild aortic valve stenosis.  9.  Chronic kidney disease stage III.  Creatinine now less than 1.5 over last 2 measurements.  Recommendations  Increase apixaban to 5 mg twice daily  Continue diuretic in the current dose as he appears euvolemic  Follow-up in 2 to 3 months with repeat echocardiogram for reasons noted above.  If there is evidence of significant pulmonary hypertension on repeat imaging would need right heart catheterization for further evaluation.      Review of Systems   Respiratory: Positive for chest tightness.    Cardiovascular: Positive for palpitations.   Neurological: Negative for light-headedness.   All other systems reviewed and are negative.    Allergies:  Omeprazole  Pantoprazole    Medications:  Eliquis  Atorvastatin  Lofibra  Levothyroxine  Nitroglycerin  Demadex    Past Medical History:     Atrial fibrillation  Atrial flutter  Hyperlipidemia  Hypertension  Hypothyroidism  Type 2 diabetes mellitus  GERD  CKD  Heart failure    Past Surgical History:    Cardioversion under anesthesia x2  Heart bypass  Colonoscopy  Coronary  "angiography  Upper endoscopy  Mass excision from head  Heart cath with angioplasty  Bilateral phacoemulsification clear cornea with intraocular lens implant  Combined septoplasty with turbinoplasty  CABG  Stress echo    Family History:    Father -  problems, hypertension  Mother - diabetes, MI  Brother -  from MI at age 31  Sister - type 2 diabetes mellitus    Social History:  The patient was not accompanied to the ED.   Marital status:   The patient resides alone as his wife was recently moved into a nursing home.    Physical Exam     Patient Vitals for the past 24 hrs:   BP Temp Temp src Pulse Resp SpO2 Height Weight   22 1330 (!) 145/79 -- -- 59 17 -- -- --   22 1300 136/76 -- -- 58 15 -- -- --   22 1230 134/78 -- -- 60 13 -- -- --   22 1200 136/73 -- -- 54 11 -- -- --   22 1130 121/67 -- -- 58 20 98 % -- --   22 1100 131/66 -- -- 60 20 98 % -- --   22 1030 (!) 145/73 -- -- 52 19 99 % -- --   22 1000 (!) 153/80 -- -- 52 19 100 % -- --   22 0944 (!) 142/60 97.6  F (36.4  C) Temporal 60 18 97 % 1.803 m (5' 11\") 90.7 kg (200 lb)       Physical Exam  Physical Exam   Nursing note and vitals reviewed.  General: Oriented to person, place, and time. Appears well-developed and well-nourished.   Head: No signs of trauma.   Mouth/Throat: Oropharynx is clear and moist.   Eyes: Conjunctivae are normal. Pupils are equal, round, and reactive to light.   Neck: Normal range of motion. No nuchal rigidity.   Cardiovascular: Normal rate, irregular rhythm with systolic ejection murmur.  Respiratory: Effort normal and breath sounds normal. No respiratory distress.   Abdominal: Soft. There is no tenderness. There is no guarding.   Musculoskeletal: Normal range of motion. no edema.   Neurological: The patient is alert and oriented to person, place, and time.  PERRLA, EOMI, visual fields intact, strength in upper/lower extremities normal and symmetrical.   Sensation " normal. Speech normal  GCS eye subscore is 4. GCS verbal subscore is 5. GCS motor subscore is 6.   Skin: Skin is warm and dry. No rash noted.   Psychiatric: normal mood and affect. behavior is normal.     Emergency Department Course   ECG  ECG obtained at 0947, ECG read at 0954  Atrial fibrillation with slow ventricular response  Nonspecific intraventricular block  T wave abnormality, consider inferolateral ischemia   Agree with computer interpretation.   No significant change as compared to prior, dated 12/16/2021.  Rate 47 bpm. FL interval * ms. QRS duration 126 ms. QT/QTc 484/428 ms. P-R-T axes * 62 197.     Imaging:  XR Chest 2 Views:  PA and lateral views of the chest were obtained. Postsurgical changes of cardiac surgery with median sternotomy wires and surgical clips. Cardiomediastinal silhouette is within normal limits. No suspicious focal pulmonary opacities. No significant pleural effusion or pneumothorax.  Report per radiology.    Laboratory:  CBC: WBC 3.8 (L), HGB 11.5 (L),   CMP: Glucose 136 (H), Anion gap 2 (L), Calcium 10.6 (H), GFR estimate 53 (L), Creatinine 1.35 (H), o/w WNL    Troponin I high sensitivity (Collected 1010): 37  BNP: 1,449  Troponin I high sensitivity (Collected 1224): 32     Emergency Department Course:     Reviewed:  I reviewed nursing notes, vitals, past medical history and Care Everywhere    Assessments:  1000 I obtained history and examined the patient in ED room 26 as noted above.   1325 I rechecked the patient and explained findings.     Disposition:  The patient was discharged to home.     Impression & Plan     Medical Decision Making:  Pascual Perea is a 81 year old male who presents for evaluation of palpitations.  Initial ECG shows atrial fibrillation.  A broad differential diagnosis was considered including SVT, Atrial fibrillation, ventricular arrhythmia, thyroid disease, acute electrolyte abnormality, drugs/medications, caffeine intake or other stimulants,  medication side effect, anemia, heart disease, PE, etc. The workup and exam here in ED would indicate that supportive outpatient management is indicated. I doubt PE as patient is on Eliquis, is not tachycardic, is not hypoxic.  Doubt acute coronary syndrome given symptoms and exam.   Cardiac enzyme troponin x2 was negative.  I think it is safe for the patient to go home and follow-up with his primary doctor in 2 days.      Diagnosis:    ICD-10-CM    1. Palpitations  R00.2    2. Chest pain, unspecified type  R07.9        Discharge Medications:  None      Scribe Disclosure:  PATRICIA, Lidia Garcia, am serving as a scribe at 10:00 AM on 2/19/2022 to document services personally performed by Kenan Love MD based on my observations and the provider's statements to me.            Kenan Love MD  02/19/22 [id number]

## 2022-02-21 DIAGNOSIS — R06.00 DYSPNEA: Primary | ICD-10-CM

## 2022-02-21 DIAGNOSIS — I50.30 HEART FAILURE WITH PRESERVED EJECTION FRACTION, NYHA CLASS II (H): ICD-10-CM

## 2022-03-04 DIAGNOSIS — J31.0 CHRONIC RHINITIS: ICD-10-CM

## 2022-03-07 RX ORDER — AZELASTINE 1 MG/ML
SPRAY, METERED NASAL
Qty: 90 ML | Refills: 3 | Status: SHIPPED | OUTPATIENT
Start: 2022-03-07 | End: 2023-06-06

## 2022-03-07 NOTE — TELEPHONE ENCOUNTER
Routing refill request to provider for review/approval because:  Antihistamines Protocol Failed 03/04/2022 06:21 PM   Protocol Details  Patient is 3-64 years of age

## 2022-03-18 ENCOUNTER — TELEPHONE (OUTPATIENT)
Dept: INTERNAL MEDICINE | Facility: CLINIC | Age: 82
End: 2022-03-18
Payer: COMMERCIAL

## 2022-03-18 NOTE — TELEPHONE ENCOUNTER
Patient is scheduled for EGD on 4/15/22.  [facility] requesting orders for 3 day hold of Eliquis prior to EGD.    Please advise. Triage to call [facility] back.    Can we leave a detailed message on this number? YES  Phone number patient can be reached at: Other phone number:  [phone]    Shalini Franco RN  [facility] Triage

## 2022-03-18 NOTE — TELEPHONE ENCOUNTER
Okay to hold Eliquis 3 days prior to endoscopy as requested.  Inform [facility].  Also call patient to make him aware of this.  Patient to restart Eliquis the day after the endoscopy.  Patient to take 44 units of Lantus the night before the endoscopy since he will not be eating the morning of the procedure and patient to hold his NovoLog insulin the morning of the procedure.  Patient to resume usual NovoLog dosing after the procedure when he is home and eating again and then resume usual Lantus 66-68 units at bedtime after the endoscopy

## 2022-03-18 NOTE — TELEPHONE ENCOUNTER
Left message for pt to return call.    Called and advised Zunilda at [facility] of Dr. Elizalde message below. Advised that if she talked to the pt to let him know to call the clinic so we can discuss the message below.     Stephanie Bhandari RN

## 2022-03-18 NOTE — TELEPHONE ENCOUNTER
Patient returning call. Relayed providers instructions, patient unable to write them down but verbalized understanding and agreement.     Confirmed that patient does use MyChart regularly  MyChart message sent with providers instructions.

## 2022-03-20 VITALS
OXYGEN SATURATION: 98 % | WEIGHT: 199 LBS | HEIGHT: 71 IN | BODY MASS INDEX: 27.86 KG/M2 | RESPIRATION RATE: 16 BRPM | HEART RATE: 92 BPM | DIASTOLIC BLOOD PRESSURE: 70 MMHG | TEMPERATURE: 97 F | SYSTOLIC BLOOD PRESSURE: 130 MMHG

## 2022-03-29 ENCOUNTER — OFFICE VISIT (OUTPATIENT)
Dept: INTERNAL MEDICINE | Facility: CLINIC | Age: 82
End: 2022-03-29
Payer: COMMERCIAL

## 2022-03-29 DIAGNOSIS — G47.33 OSA (OBSTRUCTIVE SLEEP APNEA): ICD-10-CM

## 2022-03-29 DIAGNOSIS — E83.52 HYPERCALCEMIA: ICD-10-CM

## 2022-03-29 DIAGNOSIS — E78.5 HYPERLIPIDEMIA LDL GOAL <70: ICD-10-CM

## 2022-03-29 DIAGNOSIS — K74.69 OTHER CIRRHOSIS OF LIVER (H): ICD-10-CM

## 2022-03-29 DIAGNOSIS — E03.9 HYPOTHYROIDISM, UNSPECIFIED TYPE: ICD-10-CM

## 2022-03-29 DIAGNOSIS — N18.32 STAGE 3B CHRONIC KIDNEY DISEASE (H): ICD-10-CM

## 2022-03-29 DIAGNOSIS — I50.810 RIGHT-SIDED HEART FAILURE, UNSPECIFIED HF CHRONICITY (H): ICD-10-CM

## 2022-03-29 DIAGNOSIS — Z01.818 PREOP GENERAL PHYSICAL EXAM: ICD-10-CM

## 2022-03-29 DIAGNOSIS — Z11.59 ENCOUNTER FOR SCREENING FOR OTHER VIRAL DISEASES: Primary | ICD-10-CM

## 2022-03-29 DIAGNOSIS — D64.9 ANEMIA, UNSPECIFIED TYPE: ICD-10-CM

## 2022-03-29 DIAGNOSIS — E11.9 TYPE 2 DIABETES MELLITUS WITHOUT COMPLICATION, WITH LONG-TERM CURRENT USE OF INSULIN (H): ICD-10-CM

## 2022-03-29 DIAGNOSIS — I48.91 ATRIAL FIBRILLATION, UNSPECIFIED TYPE (H): ICD-10-CM

## 2022-03-29 DIAGNOSIS — Z79.4 TYPE 2 DIABETES MELLITUS WITHOUT COMPLICATION, WITH LONG-TERM CURRENT USE OF INSULIN (H): ICD-10-CM

## 2022-03-29 LAB
ERYTHROCYTE [DISTWIDTH] IN BLOOD BY AUTOMATED COUNT: 14.5 % (ref 10–15)
HCT VFR BLD AUTO: 35.6 % (ref 40–53)
HGB BLD-MCNC: 11.5 G/DL (ref 13.3–17.7)
MCH RBC QN AUTO: 27.8 PG (ref 26.5–33)
MCHC RBC AUTO-ENTMCNC: 32.3 G/DL (ref 31.5–36.5)
MCV RBC AUTO: 86 FL (ref 78–100)
PLATELET # BLD AUTO: 181 10E3/UL (ref 150–450)
RBC # BLD AUTO: 4.13 10E6/UL (ref 4.4–5.9)
WBC # BLD AUTO: 4.6 10E3/UL (ref 4–11)

## 2022-03-29 PROCEDURE — 99214 OFFICE O/P EST MOD 30 MIN: CPT | Performed by: INTERNAL MEDICINE

## 2022-03-29 PROCEDURE — 85027 COMPLETE CBC AUTOMATED: CPT | Performed by: INTERNAL MEDICINE

## 2022-03-29 PROCEDURE — 36415 COLL VENOUS BLD VENIPUNCTURE: CPT | Performed by: INTERNAL MEDICINE

## 2022-03-29 PROCEDURE — 83550 IRON BINDING TEST: CPT | Performed by: INTERNAL MEDICINE

## 2022-03-29 PROCEDURE — 80048 BASIC METABOLIC PNL TOTAL CA: CPT | Performed by: INTERNAL MEDICINE

## 2022-03-29 NOTE — PROGRESS NOTES
[address] [address]  Phone: [phone]  Primary Provider: Andrew Biswas  Pre-op Performing Provider: ANDREW BISWAS      PREOPERATIVE EVALUATION:  Today's date: 3/29/2022    Pascual Perea is a 81 year old male who presents for a preoperative evaluation.    Surgical Information:  Surgery/Procedure: Endoscopy   Surgery Location: [facility]  Surgeon: TBD   Surgery Date: 4/15/22  Time of Surgery: 8:40 am  Where patient plans to recover: At home with family  Fax number for surgical facility: Note does not need to be faxed, will be available electronically in Epic.    Type of Anesthesia Anticipated:  MAC    Assessment & Plan     The proposed surgical procedure is considered LOW risk.    Preop general physical exam  Appears medically stable to proceed with endoscopy as scheduled    Other cirrhosis of liver (H)  MR elastography has been ordered by GI      Stage 3b chronic kidney disease (H)  renal function worsened slightly today.  On diuretic therapy.  Will monitor  - Basic metabolic panel; Future  - Basic metabolic panel    Atrial fibrillation, unspecified type (H)  Rate controlled.  Will hold Eliquis prior to procedure    Type 2 diabetes mellitus without complication, with long-term current use of insulin (H)  A1c worsened recently to 8.0 although patient has subsequently improved his diet and now blood sugars actually a little low.  We will decrease insulin doses as per patient instructions below  - Basic metabolic panel; Future  - Hemoglobin A1c; Future  - Basic metabolic panel    Right-sided heart failure, unspecified HF chronicity (H)  Asymptomatic with diuretic therapy.  Has sleep clinic consultation scheduled also to restart CPAP therapy    Anemia, unspecified type  Stable.  Endoscopy being done to look for any signs of varices, esophagitis.  Not on PPI therapy at this time  - CBC with platelets; Future  - Iron & Iron Binding Capacity; Future  -  CBC with platelets  - Iron & Iron Binding Capacity    Hypercalcemia  Stable calcium level recently and currently 11.0.  Parathyroid levels have been normal.  No monoclonal peaks seen on SPEP.  We will check 24-hour urine calcium to evaluate for familial hypocalciuric hypercalcemia and will then be referring to endocrinology if also unremarkable    Hyperlipidemia LDL goal <70  Stable with lipid therapy    Hypothyroidism, unspecified type  TSH at goal with levothyroxine    BRANDON (obstructive sleep apnea)  Currently untreated.  Patient has appointment scheduled with sleep clinic to reestablish CPAP therapy.  We will need to monitor patient closely in mikey-procedure time period for any signs of apnea       Implanted Device:  No implanted device.  Patient using a topical Freestyle Libre2 CGM for blood sugar monitoring      Risks and Recommendations:  The patient has the following additional risks and recommendations for perioperative complications:   - No identified additional risk factors other than previously addressed    Patient instructions:  Over long term, reduce Lantus to 56 units at bedtime to let AM sugars get a little higher (goal sugars ). If sugars in AM still < 70 after 4 days, then reduce further to 52 units  For the procedure:   Hold Eliquis 3 days prior to the endoscopy and then restart the day after the procedure as usual    Nothing to eat/drinik after midnight prior to the endoscopy except take Levothyroxine the  AM of the procedure when you first wake up with a small sip water. Hold other usual medications that morning  Take Lantus 36 units the night before the procedure  After the procedure, then resume usual medication doses   Contact  [place] -[phone] re: if  covid test required for endoscopy procedure and, if so, then ask them to place order.  Please call [phone] to schedule your covid test  Labs as ordered today   Call  [phone] or use [employer] to schedule a future lab appointment   non-fasting in early May 2022 for A1C recheck        RECOMMENDATION:  APPROVAL GIVEN to proceed with proposed procedure, without further diagnostic evaluation.        Subjective     HPI related to upcoming procedure: Endoscopy   Patient previously noted to have some cirrhotic changes on CT of the abdomen and mild liver enzyme elevations.  History of diabetes and previous obesity and fatty liver increasing risk.  Patient is also had a history of atrial fibrillation with some right-sided heart failure and worsening pulmonary hypertension with cor pulmonale treated with diuretic therapy.  MR elastography has been ordered to stage the fibrosis but no results available to review.  Following consultation with GI, is also recommended the patient have an upper endoscopy to look for esophageal varices as well as signs of esophagitis with history of GERD.  Patient now presents for evaluation to undergo endoscopy procedure.  See below for the medical issues.  With regards to current exercise tolerance, patient is walking 1/2 mile distance most days of the week without chest pain or shortness of breath     Preop Questions 3/29/2022   1. Have you ever had a heart attack or stroke? No   2. Have you ever had surgery on your heart or blood vessels, such as a stent placement, a coronary artery bypass, or surgery on an artery in your head, neck, heart, or legs? YES -  CABG 1997   3. Do you have chest pain with activity? No   4. Do you have a history of  heart failure? YES -Echo December 2021 showed normal LV function with ejection fraction 60-65% but Moderately decreased right ventricular systolic function and severe pulmonary hypertension with cor pulmonale.   5. Do you currently have a cold, bronchitis or symptoms of other infection? No   6. Do you have a cough, shortness of breath, or wheezing? No   7. Do you or anyone in your family have previous history of blood clots? No   8. Do you or does anyone in your family have a serious  bleeding problem such as prolonged bleeding following surgeries or cuts? No   9. Have you ever had problems with anemia or been told to take iron pills? YES -chronic mild anemia with hemoglobin ranging 11-12.  Denies seeing blood in his stools   10. Have you had any abnormal blood loss such as black, tarry or bloody stools? YES - in past. Denies recently   11. Have you ever had a blood transfusion? UNKNOWN    12. Are you willing to have a blood transfusion if it is medically needed before, during, or after your surgery? Yes   13. Have you or any of your relatives ever had problems with anesthesia? No   14. Do you have sleep apnea, excessive snoring or daytime drowsiness? YES -previously diagnosed with sleep apnea.  Has not been using a CPAP machine for quite long time.  Has follow-up appointment scheduled with sleep clinic to reinitiate CPAP further management   14a. Do you have a CPAP machine? No   15. Do you have any artifical heart valves or other implanted medical devices like a pacemaker, defibrillator, or continuous glucose monitor? No   16. Do you have artificial joints? No   17. Are you allergic to latex? No     Health Care Directive:  Patient does not have a Health Care Directive or Living Will: Patient states has Advance Directive and will bring in a copy to clinic.    Preoperative Review of :   reviewed - no record of controlled substances prescribed.      Status of Chronic Conditions:  A-FIB - Patient has a longstanding history of chronic A-fib currently on rate control. Current treatment regimen includes Eliquis for stroke prevention and denies significant symptoms of lightheadedness, palpitations or dyspnea.     ANEMIA - Patient has a recent history of mild anemia, which has not been symptomatic.  Baseline hemoglobins in the 11-12 range    CAD - Patient has a longstanding history of   CAD with CABG 1997. Patient denies recent chest pain or NTG use, denies exercise induced dyspnea or PND.     CHF  - Patient has a longstanding history of moderate RV CHF with normal LV function.  . Currently the patient's condition is stable with good exercise tolerance. Current treatment regimen includes diuretic. The patient denies chest pain, edema, orthopnea, SOB or recent weight gain. Last Echocardiogram  Dec 2021    DIABETES - Patient has a longstanding history of DiabetesType Type II . Patient is being treated with insulin injections and denies significant side effects. Control has been good except for recent A1C 8.0. However, pt has made dietary changes now and blood sugars starting to be low at times.       HYPERLIPIDEMIA - Patient has a long history of significant Hyperlipidemia requiring medication for treatment with recent good control. Patient reports no problems or side effects with the medication.     HYPERTENSION - Patient has longstanding history of HTN , currently denies any symptoms referable to elevated blood pressure. Specifically denies chest pain, palpitations, dyspnea, orthopnea, PND or peripheral edema. Blood pressure readings have been in normal range. Current medication regimen is as listed below. Patient denies any side effects of medication.     HYPOTHYROIDISM - Patient has a longstanding history of chronic Hypothyroidism. Patient has been doing well, noting no tremor, insomnia, hair loss or changes in skin texture. Continues to take medications as directed, without adverse reactions or side effects. Last TSH   Lab Results   Component Value Date    TSH 3.27 12/15/2021   .        Review of Systems  CONSTITUTIONAL: NEGATIVE for fever, chills. Weight down 20 pounds in 3 months  INTEGUMENTARY/SKIN: NEGATIVE for worrisome rashes, moles or lesions  EYES: NEGATIVE for vision changes or irritation overall. Occ floaters. Due for eye exam  ENT/MOUTH: NEGATIVE for ear, mouth and throat problems. Occ nasal congestion and clear drainage. On Astelin  RESP: NEGATIVE for significant cough or SOB  CV: NEGATIVE for  chest pain, palpitations. H as chronic mild right ankle peripheral edema. BPs at home  146-162 Systolic  GI: NEGATIVE for nausea, abdominal pain. No recent GERD with current meds. One episode of darker stool few weeks ago. Noraml since then. Rare constipation  : NEGATIVE for frequency, dysuria, or hematuria  MUSCULOSKELETAL: NEGATIVE for significant arthralgias or myalgia  NEURO: NEGATIVE for weakness, dizziness. POSITIVE for numbness in BLE distal  ENDOCRINE: NEGATIVE for temperature intolerance. On insulin for DM. On fibrate for lipids. Getting occ low sugars in the  60s when wake up in AM.  Lantus 60 units  HEME: NEGATIVE for bleeding problems with use of Eliquis for A fib  PSYCHIATRIC: NEGATIVE for changes in mood or affect     Sugars:  68,90,111,181  68,117,136,171  151,165,60,164  109,132,170,      Patient Active Problem List    Diagnosis Date Noted     Heart failure (H) 12/25/2021     Priority: Medium     Dyspnea 12/25/2021     Priority: Medium     Hyperparathyroidism (H) 11/29/2020     Priority: Medium     CKD (chronic kidney disease) stage 3, GFR 30-59 ml/min (H) 05/20/2019     Priority: Medium     Atrial fibrillation, unspecified type (H) 05/02/2018     Priority: Medium     Type 2 diabetes mellitus without complication, with long-term current use of insulin (H) 02/21/2017     Priority: Medium     Hypothyroidism, unspecified type 08/15/2016     Priority: Medium     Gastroesophageal reflux disease without esophagitis 04/27/2016     Priority: Medium     Atrial flutter (H) 04/15/2016     Priority: Medium     BRANDON (obstructive sleep apnea) 12/07/2012     Priority: Medium     Health Care Home 04/02/2012     Priority: Medium     [facility] for .  Bertha Penaloza RN-BC    [phone]   DX V65.8 REPLACED WITH [id number] HEALTH CARE HOME (04/08/2013)       Vitamin D deficiency 09/03/2011     Priority: Medium     Advanced directives, counseling/discussion 08/25/2011     Priority: Medium     Advance  Directive Problem List Overview:   Name Relationship Phone    Primary Health Care Agent            Alternative Health Care Agent          Patient states has Advance Directive and will bring in a copy to clinic. 8/25/2011          Osteopenia      Priority: Medium     Hyperlipidemia LDL goal <70 05/26/2011     Priority: Medium     Testosterone deficiency 12/08/2009     Priority: Medium     Allergic rhinitis 01/07/2008     Priority: Medium     Problem list name updated by automated process. Provider to review       Coronary atherosclerosis      Priority: Medium     Nuc Stress 4/15/16: On both stress and rest images there is a very small size mild intensity apical photopenic defect which is of equal extent and intensity on both stress and rest images. Gated images demonstrated no regional wall motion abnormalities. The left ventricular ejection fraction was calculated to be 61% with stress.       Impotence of organic origin      Priority: Medium     Essential hypertension, benign 01/03/2003     Priority: Medium      Past Medical History:   Diagnosis Date     Adhesive capsulitis of shoulder      Anemia 3/10/2014     Anemia, unspecified      Antiplatelet or antithrombotic long-term use      Arrhythmia     Atrial fib/flutter     CAD (coronary artery disease)      History of cardioversion 04/24/2018    a single synchronized shock of 120 joules restored normal sinus rhythm     History of cardioversion 02/13/2019    single shock , 200 joules successul in restoring NSR     Hyperlipidemia LDL goal <70 5/26/2011     Hypertension      Hypothyroidism      Impotence of organic origin      Laceration of finger  June 2010     left thumb s/p sutures     Numbness and tingling     diabetic neuropathy bilateral feet     BRANDON (obstructive sleep apnea)     intolerant of CPAP, uses it occasionally.  Using mandibular device occasionally     Osteopenia      Pulmonary hypertension (H) 4/6/2012     Sensorineural hearing loss, unspecified       Stented coronary artery 1997    CABG      Testosterone deficiency      Type 2 diabetes mellitus without complication  (goal A1C<8) 10/24/2015     Typical atrial flutter (H) 4/18/16     Unspecified hypothyroidism      Vitamin D deficiency 9/3/2011     Past Surgical History:   Procedure Laterality Date     ANESTHESIA CARDIOVERSION N/A 2/13/2019    Procedure: ANESTHESIA CARDIOVERSION;  Surgeon: GENERIC ANESTHESIA PROVIDER;  Location:  OR     ANESTHESIA CARDIOVERSION N/A 5/22/2019    Procedure: ANESTHESIA, FOR CARDIOVERSION;  Surgeon: GENERIC ANESTHESIA PROVIDER;  Location:  OR     CARDIAC SURGERY      bypass in 1997     CARDIOVERSION  04/24/2018     COLONOSCOPY       CORONARY ANGIOGRAPHY ADULT ORDER      4/2/2012     CORONARY ARTERY BYPASS  1997    [facility]     ENDOSCOPIC ULTRASOUND UPPER GASTROINTESTINAL TRACT (GI) N/A 3/14/2019    Procedure: ENDOSCOPIC ULTRASOUND OF UPPER GASTROINTESTINAL TRACT;  Surgeon: Ju Torres MD;  Location:  OR     EXCISE MASS HEAD Left 8/18/2016    Procedure: EXCISE MASS HEAD;  Surgeon: Ivan Hernandez MD;  Location: [facility]     HEART CATH, ANGIOPLASTY  4/2012     LAPAROSCOPIC CHOLECYSTECTOMY N/A 3/17/2019    Procedure: LAPAROSCOPIC CHOLECYSTECTOMY;  Surgeon: Janel Paz MD;  Location:  OR     PHACOEMULSIFICATION CLEAR CORNEA WITH STANDARD INTRAOCULAR LENS IMPLANT Left 6/8/2015    Procedure: PHACOEMULSIFICATION CLEAR CORNEA WITH STANDARD INTRAOCULAR LENS IMPLANT;  Surgeon: Nixon Farnsworth MD;  [facility]:  [facility]     PHACOEMULSIFICATION CLEAR CORNEA WITH STANDARD INTRAOCULAR LENS IMPLANT Right 6/22/2015    Procedure: PHACOEMULSIFICATION CLEAR CORNEA WITH STANDARD INTRAOCULAR LENS IMPLANT;  Surgeon: Nixon Farnsworth MD;  Location:  [facility]     SEPTOPLASTY, TURBINOPLASTY, COMBINED Bilateral 8/18/2016    Procedure: COMBINED SEPTOPLASTY, TURBINOPLASTY;  Surgeon: Ivan Hernandez MD;  Location: [facility]     ZZC NONSPECIFIC PROCEDURE  1995     CABG ([facility])     [facility] NONSPECIFIC PROCEDURE  8/01    stress echo     Current Outpatient Medications   Medication Sig Dispense Refill     apixaban ANTICOAGULANT (ELIQUIS) 5 MG tablet Take 1 tablet (5 mg) by mouth 2 times daily 180 tablet 3     atorvastatin (LIPITOR) 40 MG tablet TAKE 1 TABLET (40 MG) BY MOUTH DAILY 90 tablet 3     azelastine (ASTELIN) 0.1 % nasal spray USE 2 SPRAYS INTO BOTH NOSTRILS TWO TIMES A DAY 90 mL 3     blood glucose (NO BRAND SPECIFIED) test strip Use to test blood sugar 4 times daily or as directed. 400 each 1     blood glucose calibration (ACCU-CHEK NORMAN) solution USE AS DIRECTED 1 each 0     Continuous Blood Gluc  (FREESTYLE DELFINA 2 READER) CHRYSTAL 1 Device continuous 1 each 1     Continuous Blood Gluc Sensor (FREESTYLE DELFINA 2 SENSOR) MISC 1 Device every 14 days 6 each 3     fenofibrate micronized (LOFIBRA) 67 MG capsule Take 1 capsule (67 mg) by mouth every morning (before breakfast) 30 capsule 11     glucose 4 g CHEW Take 1 tablet by mouth every hour as needed for low blood sugar        insulin aspart (NOVOLOG FLEXPEN) 100 UNIT/ML pen INJECT SUBCUTANEOUSLY THREE TIMES A DAY (BEFORE MEALS) 3 UNITS PER CARB CHOICE (15 GM) APPROX 10-15 UNITS/ MEAL 45 mL 1     insulin glargine (LANTUS PEN) 100 UNIT/ML pen Inject 66-68 Units Subcutaneous At Bedtime 75 mL 3     levothyroxine (SYNTHROID/LEVOTHROID) 100 MCG tablet TAKE ONE TABLET BY MOUTH ONCE DAILY 90 tablet 3     nitroGLYcerin (NITROSTAT) 0.4 MG sublingual tablet Place 1 tablet (0.4 mg) under the tongue every 5 minutes as needed for chest pain 25 tablet 1     NOVOFINE AUTOCOVER PEN NEEDLE 30G X 8 MM miscellaneous USE AS DIRECTED FOUR TIMES A  each 2     torsemide (DEMADEX) 10 MG tablet Take 1 tablet (10 mg) by mouth daily 90 tablet 3       Allergies   Allergen Reactions     Omeprazole      diarrhea     Pantoprazole      diarrhea        Social History     Tobacco Use     Smoking status: Former Smoker     Packs/day: 1.00      Years: 6.00     Pack years: 6.00     Types: Cigarettes     Start date:      Quit date: 1964     Years since quittin.7     Smokeless tobacco: Never Used   Substance Use Topics     Alcohol use: Yes     Comment: couple drinks a year     Family History   Problem Relation Age of Onset     Genitourinary Problems Father         d: age 89; ARF     Hypertension Father      Diabetes Mother         d: age 68, CAD     Heart Disease Mother         MI     Myocardial Infarction Mother      Cardiovascular Brother         d:  age 31; CAD     Heart Disease Brother         age 31, MI     Diabetes Sister         b:  1939; DM2     History   Drug Use No         Objective     /72   Pulse 76   Temp 97.3  F (36.3  C) (Temporal)   Resp 14   Wt 88.9 kg (196 lb)   SpO2 98%   BMI 27.34 kg/m      Physical Exam  Eye: PERRL, EOMI  HENT: ear canals and TM's normal and nose and mouth without ulcers or lesions.  Moderate narrow posterior pharyngeal airway  Neck: no adenopathy. Thyroid normal to palpation. No bruits  Pulm: Lungs clear to auscultation   CV: Regular rates and rhythm  GI: Soft, nontender, Normal active bowel sounds, No hepatosplenomegaly or masses palpable  Ext: Peripheral pulses intact. Trace BLE ankle nonpitting edema.  Neuro: Normal strength and tone, sensory exam grossly normal      Recent Labs   Lab Test 22  1010 22  0910 22  0952 01/10/22  1111 21  0616 12/15/21  1323 10/20/21  1313   HGB 11.5*  --   --   --  12.7*   < >  --      --   --   --  251   < >  --     139 139   < > 137   < >  --    POTASSIUM 3.7 3.9 4.2   < > 3.9   < >  --    CR 1.35* 1.39* 1.42*   < > 1.69*   < >  --    A1C  --   --  8.0*  --   --   --  6.9*    < > = values in this interval not displayed.        Diagnostics:  Component      Latest Ref Rng & Units 3/29/2022 2022   Sodium      133 - 144 mmol/L 140    Potassium      3.4 - 5.3 mmol/L 4.3    Chloride      94 - 109 mmol/L 108    Carbon  Dioxide      20 - 32 mmol/L 27    Anion Gap      3 - 14 mmol/L 5    Urea Nitrogen      7 - 30 mg/dL 25    Creatinine      0.66 - 1.25 mg/dL 1.50 (H)    Calcium      8.5 - 10.1 mg/dL 11.0 (H)    Glucose      70 - 99 mg/dL 167 (H)    GFR Estimate      >60 mL/min/1.73m2 47 (L)    WBC      4.0 - 11.0 10e3/uL 4.6    RBC Count      4.40 - 5.90 10e6/uL 4.13 (L)    Hemoglobin      13.3 - 17.7 g/dL 11.5 (L)    Hematocrit      40.0 - 53.0 % 35.6 (L)    MCV      78 - 100 fL 86    MCH      26.5 - 33.0 pg 27.8    MCHC      31.5 - 36.5 g/dL 32.3    RDW      10.0 - 15.0 % 14.5    Platelet Count      150 - 450 10e3/uL 181    Iron      35 - 180 ug/dL 55    Iron Binding Cap      240 - 430 ug/dL 373    Iron Saturation Index      15 - 46 % 15    SARS CoV2 PCR      Negative, Testing sent to reference lab. Results will be returned via unsolicited result  Negative     EKG from 2/19/2022 showed atrial fibrillation with slow ventricular response with rate 47.  Non-specific intra-ventricular conduction block. T wave abnormality, consider inferolateral ischemia.  The T inversions in 1, AVL, V4-6 were similar to EKG from  8/17/21 when pt underwent nuclear med cardiac stress test that showed therewas a small area of nontransmural infarction in the apical segment(s) of the left ventricle but otherwise was negative for ischemia and was similar to that of a prior stress test 2018.         Revised Cardiac Risk Index (RCRI):  The patient has the following serious cardiovascular risks for perioperative complications:   - Coronary Artery Disease (MI, positive stress test, angina, Qs on EKG) = 1 point   - Congestive Heart Failure (pulmonary edema, PND, s3 lucio, CXR with pulmonary congestion, basilar rales) = 1 point   - Diabetes Mellitus (on Insulin) = 1 point     RCRI Interpretation: 3 points: Class IV (high risk - >11% complication rate)     However, CHF sx not currently active with diuretic therapy so risk would potentially be interpreted as  lower    Estimated Functional Capacity: Performs 4 METS exercise without symptoms.  Patient is walking 1/2 mile distance most days of the week without chest pain or shortness of breath              Signed Electronically by: Andrew Elizalde MD  Copy of this evaluation report is provided to requesting physician.

## 2022-03-29 NOTE — PATIENT INSTRUCTIONS
Over long term, reduce Lantus to 56 units at bedtime to let AM sugars get a little higher (goal sugars ). If sugars in AM still < 70 after 4 days, then reduce further to 52 units  For the procedure:   Hold Eliquis 3 days prior to the endoscopy and then restart the day after the procedure as usual    Nothing to eat/drinik after midnight prior to the endoscopy except take Levothyroxine the  AM of the procedure when you first wake up with a small sip water. Hold other usual medications that morning  Take Lantus 36 units the night before the procedure  After the procedure, then resume usual medication doses   Contact  [place] [phone] re: if  covid test required for endoscopy procedure and, if so, then ask them to place order.  Please call [phone] to schedule your covid test  Labs as ordered today   Call  [phone] or use [employer] to schedule a future lab appointment  non-fasting in early May 2022 for A1C recheck

## 2022-03-30 LAB
ANION GAP SERPL CALCULATED.3IONS-SCNC: 5 MMOL/L (ref 3–14)
BUN SERPL-MCNC: 25 MG/DL (ref 7–30)
CALCIUM SERPL-MCNC: 11 MG/DL (ref 8.5–10.1)
CHLORIDE BLD-SCNC: 108 MMOL/L (ref 94–109)
CO2 SERPL-SCNC: 27 MMOL/L (ref 20–32)
CREAT SERPL-MCNC: 1.5 MG/DL (ref 0.66–1.25)
GFR SERPL CREATININE-BSD FRML MDRD: 47 ML/MIN/1.73M2
GLUCOSE BLD-MCNC: 167 MG/DL (ref 70–99)
IRON SATN MFR SERPL: 15 % (ref 15–46)
IRON SERPL-MCNC: 55 UG/DL (ref 35–180)
POTASSIUM BLD-SCNC: 4.3 MMOL/L (ref 3.4–5.3)
SODIUM SERPL-SCNC: 140 MMOL/L (ref 133–144)
TIBC SERPL-MCNC: 373 UG/DL (ref 240–430)

## 2022-04-12 ENCOUNTER — LAB (OUTPATIENT)
Dept: LAB | Facility: CLINIC | Age: 82
End: 2022-04-12
Attending: INTERNAL MEDICINE
Payer: COMMERCIAL

## 2022-04-12 DIAGNOSIS — Z11.59 ENCOUNTER FOR SCREENING FOR OTHER VIRAL DISEASES: ICD-10-CM

## 2022-04-12 PROCEDURE — U0003 INFECTIOUS AGENT DETECTION BY NUCLEIC ACID (DNA OR RNA); SEVERE ACUTE RESPIRATORY SYNDROME CORONAVIRUS 2 (SARS-COV-2) (CORONAVIRUS DISEASE [COVID-19]), AMPLIFIED PROBE TECHNIQUE, MAKING USE OF HIGH THROUGHPUT TECHNOLOGIES AS DESCRIBED BY CMS-2020-01-R: HCPCS

## 2022-04-12 PROCEDURE — U0005 INFEC AGEN DETEC AMPLI PROBE: HCPCS

## 2022-04-13 LAB — SARS-COV-2 RNA RESP QL NAA+PROBE: NEGATIVE

## 2022-04-14 PROBLEM — R06.00 DYSPNEA: Status: RESOLVED | Noted: 2021-12-25 | Resolved: 2022-04-14

## 2022-04-14 PROBLEM — K74.69 OTHER CIRRHOSIS OF LIVER (H): Status: ACTIVE | Noted: 2022-04-14

## 2022-04-15 ENCOUNTER — ANESTHESIA (OUTPATIENT)
Dept: GASTROENTEROLOGY | Facility: CLINIC | Age: 82
End: 2022-04-15
Payer: COMMERCIAL

## 2022-04-15 ENCOUNTER — ANESTHESIA EVENT (OUTPATIENT)
Dept: GASTROENTEROLOGY | Facility: CLINIC | Age: 82
End: 2022-04-15
Payer: COMMERCIAL

## 2022-04-15 ENCOUNTER — HOSPITAL ENCOUNTER (OUTPATIENT)
Facility: CLINIC | Age: 82
Discharge: HOME | End: 2022-04-15
Attending: INTERNAL MEDICINE | Admitting: INTERNAL MEDICINE
Payer: COMMERCIAL

## 2022-04-15 VITALS
TEMPERATURE: 97.3 F | HEART RATE: 76 BPM | DIASTOLIC BLOOD PRESSURE: 72 MMHG | RESPIRATION RATE: 14 BRPM | WEIGHT: 196 LBS | BODY MASS INDEX: 27.34 KG/M2 | OXYGEN SATURATION: 98 % | SYSTOLIC BLOOD PRESSURE: 132 MMHG

## 2022-04-15 VITALS
DIASTOLIC BLOOD PRESSURE: 84 MMHG | OXYGEN SATURATION: 97 % | SYSTOLIC BLOOD PRESSURE: 144 MMHG | HEART RATE: 69 BPM | HEIGHT: 71 IN | BODY MASS INDEX: 27.66 KG/M2 | RESPIRATION RATE: 18 BRPM | WEIGHT: 197.6 LBS

## 2022-04-15 LAB — UPPER GI ENDOSCOPY: NORMAL

## 2022-04-15 PROCEDURE — 370N000017 HC ANESTHESIA TECHNICAL FEE, PER MIN: Performed by: INTERNAL MEDICINE

## 2022-04-15 PROCEDURE — 250N000011 HC RX IP 250 OP 636: Performed by: NURSE ANESTHETIST, CERTIFIED REGISTERED

## 2022-04-15 PROCEDURE — 258N000003 HC RX IP 258 OP 636: Performed by: NURSE ANESTHETIST, CERTIFIED REGISTERED

## 2022-04-15 PROCEDURE — 43235 EGD DIAGNOSTIC BRUSH WASH: CPT | Performed by: INTERNAL MEDICINE

## 2022-04-15 PROCEDURE — 999N000010 HC STATISTIC ANES STAT CODE-CRNA PER MINUTE: Performed by: INTERNAL MEDICINE

## 2022-04-15 PROCEDURE — 250N000009 HC RX 250: Performed by: NURSE ANESTHETIST, CERTIFIED REGISTERED

## 2022-04-15 RX ORDER — LIDOCAINE 40 MG/G
CREAM TOPICAL
Status: DISCONTINUED | OUTPATIENT
Start: 2022-04-15 | End: 2022-04-15 | Stop reason: HOSPADM

## 2022-04-15 RX ORDER — FENTANYL CITRATE 50 UG/ML
25 INJECTION, SOLUTION INTRAMUSCULAR; INTRAVENOUS EVERY 5 MIN PRN
Status: DISCONTINUED | OUTPATIENT
Start: 2022-04-15 | End: 2022-04-15 | Stop reason: HOSPADM

## 2022-04-15 RX ORDER — ONDANSETRON 4 MG/1
4 TABLET, ORALLY DISINTEGRATING ORAL EVERY 6 HOURS PRN
Status: DISCONTINUED | OUTPATIENT
Start: 2022-04-15 | End: 2022-04-15 | Stop reason: HOSPADM

## 2022-04-15 RX ORDER — ONDANSETRON 2 MG/ML
4 INJECTION INTRAMUSCULAR; INTRAVENOUS EVERY 30 MIN PRN
Status: DISCONTINUED | OUTPATIENT
Start: 2022-04-15 | End: 2022-04-15 | Stop reason: HOSPADM

## 2022-04-15 RX ORDER — LIDOCAINE HYDROCHLORIDE 20 MG/ML
INJECTION, SOLUTION INFILTRATION; PERINEURAL PRN
Status: DISCONTINUED | OUTPATIENT
Start: 2022-04-15 | End: 2022-04-15

## 2022-04-15 RX ORDER — FLUMAZENIL 0.1 MG/ML
0.2 INJECTION, SOLUTION INTRAVENOUS
Status: DISCONTINUED | OUTPATIENT
Start: 2022-04-15 | End: 2022-04-15 | Stop reason: HOSPADM

## 2022-04-15 RX ORDER — SODIUM CHLORIDE, SODIUM LACTATE, POTASSIUM CHLORIDE, CALCIUM CHLORIDE 600; 310; 30; 20 MG/100ML; MG/100ML; MG/100ML; MG/100ML
INJECTION, SOLUTION INTRAVENOUS CONTINUOUS PRN
Status: DISCONTINUED | OUTPATIENT
Start: 2022-04-15 | End: 2022-04-15

## 2022-04-15 RX ORDER — OXYCODONE HYDROCHLORIDE 5 MG/1
5 TABLET ORAL EVERY 4 HOURS PRN
Status: DISCONTINUED | OUTPATIENT
Start: 2022-04-15 | End: 2022-04-15 | Stop reason: HOSPADM

## 2022-04-15 RX ORDER — NALOXONE HYDROCHLORIDE 0.4 MG/ML
0.4 INJECTION, SOLUTION INTRAMUSCULAR; INTRAVENOUS; SUBCUTANEOUS
Status: DISCONTINUED | OUTPATIENT
Start: 2022-04-15 | End: 2022-04-15 | Stop reason: HOSPADM

## 2022-04-15 RX ORDER — ONDANSETRON 2 MG/ML
INJECTION INTRAMUSCULAR; INTRAVENOUS PRN
Status: DISCONTINUED | OUTPATIENT
Start: 2022-04-15 | End: 2022-04-15

## 2022-04-15 RX ORDER — ONDANSETRON 2 MG/ML
4 INJECTION INTRAMUSCULAR; INTRAVENOUS
Status: DISCONTINUED | OUTPATIENT
Start: 2022-04-15 | End: 2022-04-15 | Stop reason: HOSPADM

## 2022-04-15 RX ORDER — ONDANSETRON 2 MG/ML
4 INJECTION INTRAMUSCULAR; INTRAVENOUS EVERY 6 HOURS PRN
Status: DISCONTINUED | OUTPATIENT
Start: 2022-04-15 | End: 2022-04-15 | Stop reason: HOSPADM

## 2022-04-15 RX ORDER — ONDANSETRON 4 MG/1
4 TABLET, ORALLY DISINTEGRATING ORAL EVERY 30 MIN PRN
Status: DISCONTINUED | OUTPATIENT
Start: 2022-04-15 | End: 2022-04-15 | Stop reason: HOSPADM

## 2022-04-15 RX ORDER — PROPOFOL 10 MG/ML
INJECTION, EMULSION INTRAVENOUS CONTINUOUS PRN
Status: DISCONTINUED | OUTPATIENT
Start: 2022-04-15 | End: 2022-04-15

## 2022-04-15 RX ORDER — SODIUM CHLORIDE, SODIUM LACTATE, POTASSIUM CHLORIDE, CALCIUM CHLORIDE 600; 310; 30; 20 MG/100ML; MG/100ML; MG/100ML; MG/100ML
INJECTION, SOLUTION INTRAVENOUS CONTINUOUS
Status: DISCONTINUED | OUTPATIENT
Start: 2022-04-15 | End: 2022-04-15 | Stop reason: HOSPADM

## 2022-04-15 RX ORDER — PROCHLORPERAZINE MALEATE 5 MG
5 TABLET ORAL EVERY 6 HOURS PRN
Status: DISCONTINUED | OUTPATIENT
Start: 2022-04-15 | End: 2022-04-15 | Stop reason: HOSPADM

## 2022-04-15 RX ORDER — NALOXONE HYDROCHLORIDE 0.4 MG/ML
0.2 INJECTION, SOLUTION INTRAMUSCULAR; INTRAVENOUS; SUBCUTANEOUS
Status: DISCONTINUED | OUTPATIENT
Start: 2022-04-15 | End: 2022-04-15 | Stop reason: HOSPADM

## 2022-04-15 RX ORDER — HYDROMORPHONE HYDROCHLORIDE 1 MG/ML
0.5 INJECTION, SOLUTION INTRAMUSCULAR; INTRAVENOUS; SUBCUTANEOUS EVERY 5 MIN PRN
Status: DISCONTINUED | OUTPATIENT
Start: 2022-04-15 | End: 2022-04-15 | Stop reason: HOSPADM

## 2022-04-15 RX ADMIN — LIDOCAINE HYDROCHLORIDE 100 MG: 20 INJECTION, SOLUTION INFILTRATION; PERINEURAL at 09:21

## 2022-04-15 RX ADMIN — SODIUM CHLORIDE, POTASSIUM CHLORIDE, SODIUM LACTATE AND CALCIUM CHLORIDE: 600; 310; 30; 20 INJECTION, SOLUTION INTRAVENOUS at 09:21

## 2022-04-15 RX ADMIN — PROPOFOL 150 MCG/KG/MIN: 10 INJECTION, EMULSION INTRAVENOUS at 09:21

## 2022-04-15 RX ADMIN — ONDANSETRON 4 MG: 2 INJECTION INTRAMUSCULAR; INTRAVENOUS at 09:21

## 2022-04-15 ASSESSMENT — LIFESTYLE VARIABLES: TOBACCO_USE: 1

## 2022-04-15 ASSESSMENT — ENCOUNTER SYMPTOMS: DYSRHYTHMIAS: 1

## 2022-04-15 NOTE — ANESTHESIA CARE TRANSFER NOTE
Patient: Pascaul Perea    Procedure: Procedure(s):  ESOPHAGOGASTRODUODENOSCOPY (EGD)       Diagnosis: Heartburn [R12]  Diagnosis Additional Information: No value filed.    Anesthesia Type:   MAC     Note:    Oropharynx: oropharynx clear of all foreign objects and spontaneously breathing  Level of Consciousness: drowsy  Oxygen Supplementation: nasal cannula  Level of Supplemental Oxygen (L/min / FiO2): 2  Independent Airway: airway patency satisfactory and stable  Dentition: dentition unchanged  Vital Signs Stable: post-procedure vital signs reviewed and stable  Report to RN Given: handoff report given  Patient transferred to: PACU    Handoff Report: Identifed the Patient, Identified the Reponsible Provider, Reviewed the pertinent medical history, Discussed the surgical course, Reviewed Intra-OP anesthesia mangement and issues during anesthesia, Set expectations for post-procedure period and Allowed opportunity for questions and acknowledgement of understanding      Vitals:  Vitals Value Taken Time   BP     Temp     Pulse 79 04/15/22 0931   Resp 20 04/15/22 0931   SpO2 92 % 04/15/22 0931   Vitals shown include unvalidated device data.    Electronically Signed By: JAKI Mcwilliams CRNA  April 15, 2022  9:32 AM

## 2022-04-15 NOTE — ANESTHESIA POSTPROCEDURE EVALUATION
Patient: Pascual Perea    Procedure: Procedure(s):  ESOPHAGOGASTRODUODENOSCOPY (EGD)       Anesthesia Type:  MAC    Note:  Disposition: Outpatient   Postop Pain Control: Uneventful            Sign Out: Well controlled pain   PONV:    Neuro/Psych: Uneventful            Sign Out: Acceptable/Baseline neuro status   Airway/Respiratory: Uneventful            Sign Out: Acceptable/Baseline resp. status   CV/Hemodynamics: Uneventful            Sign Out: Acceptable CV status   Other NRE: NONE   DID A NON-ROUTINE EVENT OCCUR? No           Last vitals:  Vitals Value Taken Time   /84 04/15/22 1003   Temp     Pulse 69 04/15/22 1006   Resp 18 04/15/22 1006   SpO2 96 % 04/15/22 1006   Vitals shown include unvalidated device data.    Electronically Signed By: Tulio Leija MD  April 15, 2022  11:33 AM

## 2022-04-15 NOTE — ANESTHESIA PREPROCEDURE EVALUATION
Anesthesia Pre-Procedure Evaluation    Patient: Pascual Perea   MRN: [id number] : 1940        Procedure : Procedure(s):  ESOPHAGOGASTRODUODENOSCOPY (EGD)          Past Medical History:   Diagnosis Date     Adhesive capsulitis of shoulder      Anemia 3/10/2014     Anemia, unspecified      Antiplatelet or antithrombotic long-term use      Arrhythmia     Atrial fib/flutter     CAD (coronary artery disease)      History of cardioversion 2018    a single synchronized shock of 120 joules restored normal sinus rhythm     History of cardioversion 2019    single shock , 200 joules successul in restoring NSR     Hyperlipidemia LDL goal <70 2011     Hypertension      Hypothyroidism      Impotence of organic origin      Laceration of finger  2010     left thumb s/p sutures     Numbness and tingling     diabetic neuropathy bilateral feet     BRANDON (obstructive sleep apnea)     intolerant of CPAP, uses it occasionally.  Using mandibular device occasionally     Osteopenia      Pulmonary hypertension (H) 2012     Sensorineural hearing loss, unspecified      Stented coronary artery     CABG      Testosterone deficiency      Type 2 diabetes mellitus without complication  (goal A1C<8) 10/24/2015     Typical atrial flutter (H) 16     Unspecified hypothyroidism      Vitamin D deficiency 9/3/2011      Past Surgical History:   Procedure Laterality Date     ANESTHESIA CARDIOVERSION N/A 2019    Procedure: ANESTHESIA CARDIOVERSION;  Surgeon: GENERIC ANESTHESIA PROVIDER;  Location:  OR     ANESTHESIA CARDIOVERSION N/A 2019    Procedure: ANESTHESIA, FOR CARDIOVERSION;  Surgeon: GENERIC ANESTHESIA PROVIDER;  Location:  OR     CARDIAC SURGERY      bypass in      CARDIOVERSION  2018     COLONOSCOPY       CORONARY ANGIOGRAPHY ADULT ORDER      2012     CORONARY ARTERY BYPASS      [facility] to [facility]     ENDOSCOPIC ULTRASOUND UPPER GASTROINTESTINAL TRACT (GI)  N/A 2019    Procedure: ENDOSCOPIC ULTRASOUND OF UPPER GASTROINTESTINAL TRACT;  Surgeon: Ju Torres MD;  Location:  OR     EXCISE MASS HEAD Left 2016    Procedure: EXCISE MASS HEAD;  Surgeon: Ivan Hernandez MD;  Location: [facility]     HEART CATH, ANGIOPLASTY  2012     LAPAROSCOPIC CHOLECYSTECTOMY N/A 2019    Procedure: LAPAROSCOPIC CHOLECYSTECTOMY;  Surgeon: Janel Paz MD;  Location:  OR     PHACOEMULSIFICATION CLEAR CORNEA WITH STANDARD INTRAOCULAR LENS IMPLANT Left 2015    Procedure: PHACOEMULSIFICATION CLEAR CORNEA WITH STANDARD INTRAOCULAR LENS IMPLANT;  Surgeon: Nixon Farnsworth MD;  Location:  [facility]     PHACOEMULSIFICATION CLEAR CORNEA WITH STANDARD INTRAOCULAR LENS IMPLANT Right 2015    Procedure: PHACOEMULSIFICATION CLEAR CORNEA WITH STANDARD INTRAOCULAR LENS IMPLANT;  Surgeon: Nixon Farnsworth MD;  Location:  [facility]     SEPTOPLASTY, TURBINOPLASTY, COMBINED Bilateral 2016    Procedure: COMBINED SEPTOPLASTY, TURBINOPLASTY;  Surgeon: Ivan Hernandez MD;  Location: [facility]     ZZC NONSPECIFIC PROCEDURE      CABG (Mandaeism)     ZZC NONSPECIFIC PROCEDURE  2001    stress echo      Allergies   Allergen Reactions     Omeprazole      diarrhea     Pantoprazole      diarrhea      Social History     Tobacco Use     Smoking status: Former Smoker     Packs/day: 1.00     Years: 6.00     Pack years: 6.00     Types: Cigarettes     Start date:      Quit date: 1964     Years since quittin.8     Smokeless tobacco: Never Used   Substance Use Topics     Alcohol use: Yes     Comment: couple drinks a year      Wt Readings from Last 1 Encounters:   22 88.9 kg (196 lb)        Anesthesia Evaluation            ROS/MED HX  ENT/Pulmonary:     (+) sleep apnea, uses CPAP, tobacco use, Past use,     Neurologic:  - neg neurologic ROS     Cardiovascular: Comment: Severe Pulm HTN with Cor pulmonale    (+) Dyslipidemia hypertension--CAD  -CABG--Taking blood thinners dysrhythmias, a-fib, pulmonary hypertension, Previous cardiac testing   Echo: Date: 2021 Results:  Interpretation Summary     1. Left ventricular systolic function is normal. The visual ejection fraction  is 60-65%.  2. Flattened septum is consistent with RV pressure/volume overload.  3. The right ventricle is severely dilated. Moderately decreased right  ventricular systolic function.  4. There is severe biatrial dilatation.  5. Mild aortic stenosis; mean gradient 15 mmHg, peak velocity 2.6 m/sec,  calculated valve area 1.4 cm2.  6. Severe pulmnary hypertension; The right ventricular systolic pressure is  approximated at 70mmHg plus the right atrial pressure. IVC diameter >2.1 cm  collapsing <50% with sniff suggests a high RA pressure estimated at 15 mmHg or  greater.  7. In comparison with the prior studies, severe pulmonary hypertension is  present with evidence for cor pulmonale.    Stress Test: Date: Results:    ECG Reviewed: Date: Results:    Cath: Date: Results:      METS/Exercise Tolerance:     Hematologic:       Musculoskeletal:       GI/Hepatic:     (+) GERD, Asymptomatic on medication, liver disease,     Renal/Genitourinary:     (+) renal disease, type: CRI,     Endo:     (+) type II DM, thyroid problem, hypothyroidism,     Psychiatric/Substance Use:  - neg psychiatric ROS     Infectious Disease:       Malignancy:       Other:               OUTSIDE LABS:  CBC:   Lab Results   Component Value Date    WBC 4.6 03/29/2022    WBC 3.8 (L) 02/19/2022    HGB 11.5 (L) 03/29/2022    HGB 11.5 (L) 02/19/2022    HCT 35.6 (L) 03/29/2022    HCT 36.2 (L) 02/19/2022     03/29/2022     02/19/2022     BMP:   Lab Results   Component Value Date     03/29/2022     02/19/2022    POTASSIUM 4.3 03/29/2022    POTASSIUM 3.7 02/19/2022    CHLORIDE 108 03/29/2022    CHLORIDE 107 02/19/2022    CO2 27 03/29/2022    CO2 31 02/19/2022    BUN 25 03/29/2022    BUN 16 02/19/2022    CR  1.50 (H) 03/29/2022    CR 1.35 (H) 02/19/2022     (H) 03/29/2022     (H) 02/19/2022     COAGS:   Lab Results   Component Value Date    PTT 52 (H) 04/18/2018    INR 1.43 (H) 05/22/2019     POC:   Lab Results   Component Value Date     (H) 01/09/2020     HEPATIC:   Lab Results   Component Value Date    ALBUMIN 4.0 02/19/2022    PROTTOTAL 7.3 02/19/2022    ALT 37 02/19/2022    AST 25 02/19/2022    ALKPHOS 86 02/19/2022    BILITOTAL 0.7 02/19/2022     OTHER:   Lab Results   Component Value Date    LACT 1.0 03/16/2019    A1C 8.0 (H) 01/24/2022    STARR 11.0 (H) 03/29/2022    PHOS 2.4 (L) 03/16/2019    MAG 1.7 01/09/2020    LIPASE 121 03/13/2019    TSH 3.27 12/15/2021    T4 1.50 (H) 04/13/2020    SED 6 01/10/2022       Anesthesia Plan    ASA Status:  4   NPO Status:  NPO Appropriate    Anesthesia Type: MAC.   Induction: Intravenous, Propofol.           Consents    Anesthesia Plan(s) and associated risks, benefits, and realistic alternatives discussed. Questions answered and patient/representative(s) expressed understanding.    - Discussed:     - Discussed with:  Patient      - Extended Intubation/Ventilatory Support Discussed: No.      - Patient is DNR/DNI Status: No    Use of blood products discussed: No .     Postoperative Care    Pain management: Multi-modal analgesia.   PONV prophylaxis: Ondansetron (or other 5HT-3)     Comments:    Other Comments: Patient is counseled on the anesthesia plan and relevant anesthesia procedures including all risks and benefits. All patient questions were answered.     Minimal sedation.             Tulio Leija MD

## 2022-04-15 NOTE — H&P
[facility] - Pre-Endoscopy History and Physical     Pascual Perea MRN# [id number]   YOB: 1940 Age: 81 year old     Date of Procedure: 4/15/2022  Primary care provider: Andrew Elizalde  Type of Endoscopy: EGD  Reason for Procedure: Variceal screen  Type of Anesthesia Anticipated: MAC    HPI:    Pascual is a 81 year old male who will be undergoing the above procedure.      A history and physical has been performed, 3/29/22, reviewed.     The patient's medications and allergies have been reviewed. The risks and benefits of the procedure and the sedation options and risks were discussed with the patient.  All questions were answered and informed consent was obtained.      He denies a personal or family history of anesthesia complications or bleeding disorders.     Patient Active Problem List   Diagnosis     Essential hypertension, benign     Coronary atherosclerosis     Impotence of organic origin     Allergic rhinitis     Testosterone deficiency     Hyperlipidemia LDL goal <70     Osteopenia     Advanced directives, counseling/discussion     Vitamin D deficiency     Health Care Home     BRANDON (obstructive sleep apnea)     Atrial flutter (H)     Gastroesophageal reflux disease without esophagitis     Hypothyroidism, unspecified type     Type 2 diabetes mellitus without complication, with long-term current use of insulin (H)     Atrial fibrillation, unspecified type (H)     CKD (chronic kidney disease) stage 3, GFR 30-59 ml/min (H)     Hyperparathyroidism (H)     Other cirrhosis of liver (H)        Past Medical History:   Diagnosis Date     Adhesive capsulitis of shoulder      Anemia 3/10/2014     Anemia, unspecified      Antiplatelet or antithrombotic long-term use      Arrhythmia     Atrial fib/flutter     CAD (coronary artery disease)      History of cardioversion 04/24/2018    a single synchronized shock of 120 joules restored normal sinus rhythm     History of cardioversion 02/13/2019    single shock , 200  christiane successul in restoring NSR     Hyperlipidemia LDL goal <70 5/26/2011     Hypertension      Hypothyroidism      Impotence of organic origin      Laceration of finger  June 2010     left thumb s/p sutures     Numbness and tingling     diabetic neuropathy bilateral feet     BRANDON (obstructive sleep apnea)     intolerant of CPAP, uses it occasionally.  Using mandibular device occasionally     Osteopenia      Pulmonary hypertension (H) 4/6/2012     Sensorineural hearing loss, unspecified      Stented coronary artery 1997    CABG      Testosterone deficiency      Type 2 diabetes mellitus without complication  (goal A1C<8) 10/24/2015     Typical atrial flutter (H) 4/18/16     Unspecified hypothyroidism      Vitamin D deficiency 9/3/2011        Past Surgical History:   Procedure Laterality Date     ANESTHESIA CARDIOVERSION N/A 02/13/2019    Procedure: ANESTHESIA CARDIOVERSION;  Surgeon: GENERIC ANESTHESIA PROVIDER;  Location:  OR     ANESTHESIA CARDIOVERSION N/A 05/22/2019    Procedure: ANESTHESIA, FOR CARDIOVERSION;  Surgeon: GENERIC ANESTHESIA PROVIDER;  Location:  OR     CARDIAC SURGERY      bypass in 1997     CARDIOVERSION  04/24/2018     COLONOSCOPY       CORONARY ANGIOGRAPHY ADULT ORDER      4/2/2012     CORONARY ARTERY BYPASS  1997    [facility]     ENDOSCOPIC ULTRASOUND UPPER GASTROINTESTINAL TRACT (GI) N/A 03/14/2019    Procedure: ENDOSCOPIC ULTRASOUND OF UPPER GASTROINTESTINAL TRACT;  Surgeon: Ju Torres MD;  Location:  OR     EXCISE MASS HEAD Left 08/18/2016    Procedure: EXCISE MASS HEAD;  Surgeon: Ivan Hernandez MD;  Location:  [facility]     HEART CATH, ANGIOPLASTY  04/2012     LAPAROSCOPIC CHOLECYSTECTOMY N/A 03/17/2019    Procedure: LAPAROSCOPIC CHOLECYSTECTOMY;  Surgeon: Janel Paz MD;  Location:  OR     PHACOEMULSIFICATION CLEAR CORNEA WITH STANDARD INTRAOCULAR LENS IMPLANT Left 06/08/2015    Procedure: PHACOEMULSIFICATION CLEAR CORNEA WITH STANDARD  INTRAOCULAR LENS IMPLANT;  Surgeon: Nixon Farnsworth MD;  Location:  [facility]     PHACOEMULSIFICATION CLEAR CORNEA WITH STANDARD INTRAOCULAR LENS IMPLANT Right 2015    Procedure: PHACOEMULSIFICATION CLEAR CORNEA WITH STANDARD INTRAOCULAR LENS IMPLANT;  Surgeon: Nixon Farnsworth MD;  Location:  [facility]     SEPTOPLASTY, TURBINOPLASTY, COMBINED Bilateral 2016    Procedure: COMBINED SEPTOPLASTY, TURBINOPLASTY;  Surgeon: Ivan Hernandez MD;  Location: [facility] NONSPECIFIC PROCEDURE      CABG (Islam)     [facility] NONSPECIFIC PROCEDURE  2001    stress echo       Social History     Tobacco Use     Smoking status: Former Smoker     Packs/day: 1.00     Years: 6.00     Pack years: 6.00     Types: Cigarettes     Start date:      Quit date: 1964     Years since quittin.8     Smokeless tobacco: Never Used   Substance Use Topics     Alcohol use: Yes     Comment: couple drinks a year       Family History   Problem Relation Age of Onset     Genitourinary Problems Father         d: age 89; ARF     Hypertension Father      Diabetes Mother         d: age 68, CAD     Heart Disease Mother         MI     Myocardial Infarction Mother      Cardiovascular Brother         d:  age 31; CAD     Heart Disease Brother         age 31, MI     Diabetes Sister         b:  1939; DM2       Prior to Admission medications    Medication Sig Start Date End Date Taking? Authorizing Provider   apixaban ANTICOAGULANT (ELIQUIS) 5 MG tablet Take 1 tablet (5 mg) by mouth 2 times daily 22  Yes Phoenix Adams MD   atorvastatin (LIPITOR) 40 MG tablet TAKE 1 TABLET (40 MG) BY MOUTH DAILY 21  Yes Andrew Elizalde MD   fenofibrate micronized (LOFIBRA) 67 MG capsule Take 1 capsule (67 mg) by mouth every morning (before breakfast) 5/10/21  Yes Andrew Elizalde MD   insulin aspart (NOVOLOG FLEXPEN) 100 UNIT/ML pen INJECT SUBCUTANEOUSLY THREE TIMES A DAY (BEFORE MEALS) 3 UNITS PER CARB CHOICE (15 GM) APPROX 10-15 UNITS/  "MEAL 9/28/21  Yes Andrew Elizalde MD   insulin glargine (LANTUS PEN) 100 UNIT/ML pen Inject 66-68 Units Subcutaneous At Bedtime 9/14/21  Yes Andrew Elizalde MD   levothyroxine (SYNTHROID/LEVOTHROID) 100 MCG tablet TAKE ONE TABLET BY MOUTH ONCE DAILY 10/13/21  Yes Andrew Elizalde MD   torsemide (DEMADEX) 10 MG tablet Take 1 tablet (10 mg) by mouth daily 1/10/22  Yes Valentine Boyd APRN CNP   azelastine (ASTELIN) 0.1 % nasal spray USE 2 SPRAYS INTO BOTH NOSTRILS TWO TIMES A DAY 3/7/22   Andrew Elizalde MD   blood glucose (NO BRAND SPECIFIED) test strip Use to test blood sugar 4 times daily or as directed. 4/28/20   Andrew Elizalde MD   blood glucose calibration (ACCU-CHEK NORMAN) solution USE AS DIRECTED 10/6/20   Andrew Elizalde MD   Continuous Blood Gluc  (FREESTYLE DELFINA 2 READER) CHRYSTAL 1 Device continuous 5/13/21   Andrew Elizalde MD   Continuous Blood Gluc Sensor (FREESTYLE DELFINA 2 SENSOR) MISC 1 Device every 14 days 5/13/21   Andrew Elizalde MD   glucose 4 g CHEW Take 1 tablet by mouth every hour as needed for low blood sugar     Unknown, Entered By History   nitroGLYcerin (NITROSTAT) 0.4 MG sublingual tablet Place 1 tablet (0.4 mg) under the tongue every 5 minutes as needed for chest pain 6/17/21   Andrew Elizalde MD   NOVOFINE AUTOCOVER PEN NEEDLE 30G X 8 MM miscellaneous USE AS DIRECTED FOUR TIMES A DAY 1/4/22   Andrew Elizalde MD       Allergies   Allergen Reactions     Omeprazole      diarrhea     Pantoprazole      diarrhea        REVIEW OF SYSTEMS:   A comprehensive ROS was negative    PHYSICAL EXAM:   BP (!) 148/77   Pulse 75   Ht 1.803 m (5' 11\")   Wt 89.6 kg (197 lb 9.6 oz)   SpO2 98%   BMI 27.56 kg/m   Estimated body mass index is 27.56 kg/m  as calculated from the following:    Height as of this encounter: 1.803 m (5' 11\").    Weight as of this encounter: 89.6 kg (197 lb 9.6 oz).   GENERAL APPEARANCE: alert, and oriented  MENTAL STATUS: alert  RESP: lungs clear to auscultation - no rales, rhonchi " or wheezes  CV: regular rates and rhythm      IMPRESSION   Variceal screen    PLAN:     EGD      The above has been forwarded to the consulting provider.      Signed Electronically by: Erik Baca DO  April 15, 2022

## 2022-04-15 NOTE — INTERVAL H&P NOTE
"I have reviewed the surgical (or preoperative) H&P that is linked to this encounter, and examined the patient. There are no significant changes    Clinical Conditions Present on Arrival:  Clinically Significant Risk Factors Present on Admission            # Hypercalcemia: Ca = N/A and/or iCa = N/A on admission, will monitor as appropriate      # Coagulation Defect: home medication list includes an anticoagulant medication   # Overweight: Estimated body mass index is 27.56 kg/m  as calculated from the following:    Height as of this encounter: 1.803 m (5' 11\").    Weight as of this encounter: 89.6 kg (197 lb 9.6 oz).       "

## 2022-04-18 NOTE — TELEPHONE ENCOUNTER
Call pt.  [facility] pharmacy not able to get Ranitidine now  Due to recall with issues we discussed earlier today. Will therefore change pt to Famotidine (generic Pepcid) 20mg tab, 1 tab BID instead for acid reflux. Rx sent to [facility] pharmacy   No action was needed

## 2022-04-22 NOTE — PROGRESS NOTES
[facility]  Cardiology Progress Note  Date of Service: 12/27/2021  Primary Cardiologist: Dr. Adams (general) and Dr. Jarvis (EP)    Assessment & Plan   Pascual Perea is a 81 year old male with past medical history significant for CAD s/p CABG in 1990s, peramanent atrial fibrillation, T2DM, HLP  admitted on 12/25/2021 with abdominal bloating and dyspnea and found to be in acute on chronic heart failure. Lasix was stopped by PCP in September due to elevated calcium.     Interval History:  Improved ambulation with therapy without shortness of breath.  Abdominal bloating also improved.    COVID status: Negative 12/25    Assessment:   1. Acute on Chronic HFpEF and RV dysfunction     [PTA: Metoprolol 25 mg twice daily.  Not on PTA Lasix as it was stopped by PCP due to elevated calcium in September]    BNP 7439    Weight down 7 pounds (unsure of dry weight but patient states his goal is around 200 pounds).  Weight was 209 today.    Net -5.6 L    Lasix 20 mg IV BID    Creatine 1.43-1.52-1.56    2. Atrial fibrillation, permanent    [PTA: Apixaban 5 mg twice daily and metoprolol 25 mg twice daily]    Apixaban for CVA prophylaxis    3. CAD    [PTA: Metoprolol, statin, fenofibrate]    CABG in 1990s     4. Appearance of cirrhosis on CT    GI consulted and recommend outpatient liver clinic follow-up and low-sodium diet    5. HLP    [PTA: Atorvastatin 40 mg daily and fenofibrate 67 mg daily]      Plan:  1. IV Lasix 40 mg x BID  2. Transition to PO torsemide 10 mg tomorrow dependant on renal function and diuresis  3. Continue daily weights and strict I&O  4. Consider outpatient sleep study    JAKI FERRELL CNP  Pager:  [phone]   (7am - 5pm, M-F)    Physical Exam   Temp: 98.5  F (36.9  C) Temp src: Oral BP: 134/71 Pulse: 65   Resp: 18 SpO2: 95 % O2 Device: None (Room air)    Vitals:    12/25/21 1655 12/26/21 0557 12/27/21 0650   Weight: 98 kg (216 lb 1.6 oz) 96.1 kg (211 lb 12.8 oz) 95.1  kg (209 lb 9.6 oz)       Constitutional:   NAD    Skin:   Warm and dry   Head:   Nontraumatic   Neck:   no JVD   Lungs:   symmetric, clear to auscultation   Cardiovascular:   regular rate and rhythm   Abdomen:   Soft   Extremities and Back:   No edema   Neurological:   Grossly nonfocal     Medications     - MEDICATION INSTRUCTIONS -       - MEDICATION INSTRUCTIONS -       ACE/ARB/ARNI NOT PRESCRIBED         apixaban ANTICOAGULANT  5 mg Oral BID     atorvastatin  40 mg Oral Daily     azelastine  2 spray Both Nostrils BID     fenofibrate  54 mg Oral QAM AC     furosemide  20 mg Intravenous BID     insulin aspart   Subcutaneous TID w/meals     insulin aspart  1-7 Units Subcutaneous TID AC     insulin aspart  1-5 Units Subcutaneous At Bedtime     insulin glargine  68 Units Subcutaneous At Bedtime     levothyroxine  100 mcg Oral QAM AC     [Held by provider] metoprolol tartrate  25 mg Oral BID     sodium chloride (PF)  3 mL Intracatheter Q8H       Code Status    No CPR- Do NOT Intubate    Allergies   Allergies   Allergen Reactions     Omeprazole      diarrhea     Pantoprazole      diarrhea       Data     Most Recent 3 CBC's:Recent Labs   Lab Test 12/25/21  1145 04/13/20  1450 02/18/20  0926   WBC 5.5 4.3 3.4*   HGB 11.0* 13.4 12.1*   MCV 88 87 86    167 124*     Most Recent 3 BMP's:Recent Labs   Lab Test 12/27/21  0725 12/27/21  0632 12/27/21  0220 12/26/21  0755 12/26/21  0553 12/25/21  2112 12/25/21  1800 12/25/21  1726 12/25/21  1145   NA  --  140  --   --  139  --   --   --  139   POTASSIUM  --  3.8  --   --  3.5  --  4.0  --  4.1   CHLORIDE  --  107  --   --  105  --   --   --  107   CO2  --  29  --   --  29  --   --   --  28   BUN  --  24  --   --  21  --   --   --  23   CR  --  1.56*  --   --  1.52*  --   --   --  1.43*   ANIONGAP  --  4  --   --  5  --   --   --  4   STARR  --  10.4*  --   --  10.2*  --   --   --  10.3*   * 115* 131*   < > 113*   < >  --    < > 164*    < > = values in this interval  not displayed.     Most Recent 3 BNP's:Recent Labs   Lab Test 12/25/21  1145 01/08/20  1514 04/19/19  0848 04/16/18  1350   NTBNPI 7,439* 128  --  1,793   NTBNP  --   --  985*  --           Yes

## 2022-04-25 DIAGNOSIS — E11.9 TYPE 2 DIABETES MELLITUS WITHOUT COMPLICATION, WITH LONG-TERM CURRENT USE OF INSULIN (H): ICD-10-CM

## 2022-04-25 DIAGNOSIS — Z79.4 TYPE 2 DIABETES MELLITUS WITHOUT COMPLICATION, WITH LONG-TERM CURRENT USE OF INSULIN (H): ICD-10-CM

## 2022-04-26 NOTE — TELEPHONE ENCOUNTER
Routing refill request to provider for review/approval because:  Drug not on the [facility] refill protocol   Charleen Medrano RN

## 2022-05-02 ASSESSMENT — SLEEP AND FATIGUE QUESTIONNAIRES
HOW LIKELY ARE YOU TO NOD OFF OR FALL ASLEEP WHEN YOU ARE A PASSENGER IN A CAR FOR AN HOUR WITHOUT A BREAK: SLIGHT CHANCE OF DOZING
HOW LIKELY ARE YOU TO NOD OFF OR FALL ASLEEP WHILE SITTING QUIETLY AFTER LUNCH WITHOUT ALCOHOL: SLIGHT CHANCE OF DOZING
HOW LIKELY ARE YOU TO NOD OFF OR FALL ASLEEP WHILE LYING DOWN TO REST IN THE AFTERNOON WHEN CIRCUMSTANCES PERMIT: SLIGHT CHANCE OF DOZING
HOW LIKELY ARE YOU TO NOD OFF OR FALL ASLEEP WHILE SITTING AND READING: SLIGHT CHANCE OF DOZING
HOW LIKELY ARE YOU TO NOD OFF OR FALL ASLEEP WHILE WATCHING TV: SLIGHT CHANCE OF DOZING
HOW LIKELY ARE YOU TO NOD OFF OR FALL ASLEEP WHILE SITTING AND TALKING TO SOMEONE: WOULD NEVER DOZE
HOW LIKELY ARE YOU TO NOD OFF OR FALL ASLEEP IN A CAR, WHILE STOPPED FOR A FEW MINUTES IN TRAFFIC: WOULD NEVER DOZE
HOW LIKELY ARE YOU TO NOD OFF OR FALL ASLEEP WHILE SITTING INACTIVE IN A PUBLIC PLACE: WOULD NEVER DOZE

## 2022-05-03 ENCOUNTER — TRANSFERRED RECORDS (OUTPATIENT)
Dept: HEALTH INFORMATION MANAGEMENT | Facility: CLINIC | Age: 82
End: 2022-05-03
Payer: COMMERCIAL

## 2022-05-03 LAB
ALT SERPL-CCNC: 30 IU/L (ref 0–44)
AST SERPL-CCNC: 21 IU/L (ref 0–40)

## 2022-05-04 ENCOUNTER — LAB (OUTPATIENT)
Dept: LAB | Facility: CLINIC | Age: 82
End: 2022-05-04

## 2022-05-04 DIAGNOSIS — E11.9 TYPE 2 DIABETES MELLITUS WITHOUT COMPLICATION, WITH LONG-TERM CURRENT USE OF INSULIN (H): ICD-10-CM

## 2022-05-04 DIAGNOSIS — Z79.4 TYPE 2 DIABETES MELLITUS WITHOUT COMPLICATION, WITH LONG-TERM CURRENT USE OF INSULIN (H): ICD-10-CM

## 2022-05-04 LAB — HBA1C MFR BLD: 6.8 % (ref 0–5.6)

## 2022-05-04 PROCEDURE — 83036 HEMOGLOBIN GLYCOSYLATED A1C: CPT

## 2022-05-04 PROCEDURE — 36415 COLL VENOUS BLD VENIPUNCTURE: CPT

## 2022-05-05 ENCOUNTER — OFFICE VISIT (OUTPATIENT)
Dept: SLEEP MEDICINE | Facility: CLINIC | Age: 82
End: 2022-05-05
Payer: COMMERCIAL

## 2022-05-05 VITALS
WEIGHT: 202.6 LBS | DIASTOLIC BLOOD PRESSURE: 82 MMHG | SYSTOLIC BLOOD PRESSURE: 131 MMHG | BODY MASS INDEX: 28.36 KG/M2 | HEIGHT: 71 IN | OXYGEN SATURATION: 96 % | HEART RATE: 81 BPM

## 2022-05-05 DIAGNOSIS — I48.91 ATRIAL FIBRILLATION, UNSPECIFIED TYPE (H): ICD-10-CM

## 2022-05-05 DIAGNOSIS — I27.20 PULMONARY HTN (H): ICD-10-CM

## 2022-05-05 DIAGNOSIS — G47.33 OSA (OBSTRUCTIVE SLEEP APNEA): Primary | ICD-10-CM

## 2022-05-05 DIAGNOSIS — I50.9 HEART FAILURE, UNSPECIFIED HF CHRONICITY, UNSPECIFIED HEART FAILURE TYPE (H): ICD-10-CM

## 2022-05-05 DIAGNOSIS — I10 ESSENTIAL HYPERTENSION, BENIGN: ICD-10-CM

## 2022-05-05 PROCEDURE — 99205 OFFICE O/P NEW HI 60 MIN: CPT | Performed by: PHYSICIAN ASSISTANT

## 2022-05-05 NOTE — PROGRESS NOTES
Outpatient Sleep Medicine Consultation:      Name: Pascual Perea MRN# [id number]   Age: 81 year old YOB: 1940     Date of Consultation: May 5, 2022  Consultation is requested by: Andrew Elizalde MD  [address] [place] [address] Andrew Elizalde  Primary care provider: Andrew Elizalde       Reason for Sleep Consult:     Pascual Perea is sent by Andrew Elizalde for a sleep consultation regarding BRANDON    Patient s Reason for visit  Pascual Perea main reason for visit: I was in the hospital with heart failure so they set me up with this appoint  Patient states problem(s) started: 12/24/2021  Pascual Peera's goals for this visit:             Assessment and Plan:   Summary Sleep Diagnoses:  1. BRANDON (obstructive sleep apnea)    Comorbid Diagnoses:  2. Atrial fibrillation, unspecified type (H)  3. Heart failure, unspecified HF chronicity, unspecified heart failure type (H)  4. Pulmonary HTN (H)  5. Essential hypertension, benign    Patient presents to clinic today to re-establish care of his previously diagnosed BRANDON. Do not have copy of full PSG but prior chart notes show PSG completed 8/30/2010 showed moderate BRANDON with AHI 19, desaturation roscoe 84%. Was treated with CPAP and MAD but did not tolerate either particularly well and has been without therapy the past number of years. Presents today with concern that untreated BRANDON may be contirnuting to pulmonary HTN, heart failure, HTN. Discussed increased cardiovascular risk associated with untreated BRANDON and patient is willing to re-start CPAP. Discussed need to first update sleep study to evaluate current BRANDON severity/qualify him for treatment. He actually denies classic BRANDON sx today such as snoring, excessive sleepiness, gasp arousals, witnessed events but sleep history largely unknown given no current bed partner. An order for in lab polysomnogram placed today for evaluation. He will follow up 1-2 weeks following his study for review of results and to  "expedite care. Educational materials provided in instructions.  - Comprehensive Sleep Study; Future         History of Present Illness:     Past Sleep Evaluations: Patient previously seen by my colleagues Bennett Goltz PAC and Dr. Johansen. Most recent visit 2014.     Do not have complete copy of prior PSG today but on chart review appears he was diagnosed with moderate BRANDON via PSG on 8/30/2010 with an AHI 19, desaturation roscoe 84%.     Was started on CPAP following this positive study and per patient used for about 2 years before he discontinued. Reports his wife did not like the noise from the machine, he struggled with dry mouth despite different masks, and \"just didn't see a difference\" so he stopped the CPAP.     Switched from CPAP to oral appliance therapy. Used this for another couple years per memory before discontinued. Recalls it was adjusted to maximum setting and had some jaw discomfort, also \"didn't do anything either\" so stopped and has been without therapy the past number of years.     SLEEP-WAKE SCHEDULE:     Work/School Days: Patient goes to school/work: No   Usually gets into bed at 11:00PM  Takes patient about 5 - 10 minutes to fall asleep  Has trouble falling asleep 1 night per week  Wakes up in the middle of the night 2 times.  Wakes up due to Use the bathroom  Patient is usually up at 8:00AM  Uses alarm: No    Weekends/Non-work Days/All Other Days:  Usually gets into bed at 11:00PM  Takes patient about 5 minutes to fall asleep  Patient is usually up at 8:00AM  Uses alarm: No    Sleep Need  Patient gets  8 hours sleep on average   Patient thinks he needs about 8 hours sleep    Pascual Perea prefers to sleep in this position(s): Side   Patient states they do the following activities in bed:      Naps  Patient takes a purposeful nap once a day and naps are usually 1 hour in duration after lunch  He feels better after a nap: Yes  Patient has had a driving accident or near-miss due to " "sleepiness/drowsiness: No    SLEEP DISRUPTIONS:    Breathing/Snoring  Patient snores: Unknown - sleeps alone   Other people complain about his snoring: No  Patient has been told he stops breathing in his sleep: No, but again sleeps alone  He has issues with the following: Morning mouth dryness, Getting up to urinate more than once    Movement:  Patient gets pain, discomfort, with an urge to move:  Yes - denies classic restless legs sx but reprots he can get muscle cramps in his feet at night, \"just once in a while\"  It happens when he is resting:  Yes  It happens more at night:  Yes  Patient has been told he kicks his legs at night:  No    Behaviors in Sleep:  Pascual Perea has experienced the following behaviors while sleeping:  None  He has experienced sudden muscle weakness during the day: No      CAFFEINE AND OTHER SUBSTANCES:    Patient consumes caffeinated beverages per day:  1 cup of coffee  Last caffeine use is usually: morning  Patient drinks alcohol to help them sleep: No  Patient drinks alcohol near bedtime: No    Family History:  Patient has a family member been diagnosed with a sleep disorder: No    SCALES:    EPWORTH SLEEPINESS SCALE      [place] Sleepiness Scale ( KYLE Mraiee  1990-1997[facility] [place]/English - Final version - 21 Nov 07 - [facility] [employer] 5/2/2022   Sitting and reading Slight chance of dozing   Watching TV Slight chance of dozing   Sitting, inactive in a public place (e.g. a theatre or a meeting) Would never doze   As a passenger in a car for an hour without a break Slight chance of dozing   Lying down to rest in the afternoon when circumstances permit Slight chance of dozing   Sitting and talking to someone Would never doze   Sitting quietly after a lunch without alcohol Slight chance of dozing   In a car, while stopped for a few minutes in traffic Would never doze   [place] Score (MC) 0   [place] Score (Sleep) 5       INSOMNIA SEVERITY INDEX (KATHY)      Insomnia Severity Index " (KATHY) 5/2/2022   Difficulty falling asleep 1   Difficulty staying asleep 0   Problems waking up too early 0   How SATISFIED/DISSATISFIED are you with your CURRENT sleep pattern? 1   How NOTICEABLE to others do you think your sleep problem is in terms of impairing the quality of your life? 0   How WORRIED/DISTRESSED are you about your current sleep problem? 0   To what extent do you consider your sleep problem to INTERFERE with your daily functioning (e.g. daytime fatigue, mood, ability to function at work/daily chores, concentration, memory, mood, etc.) CURRENTLY? 0   KATHY Total Score 2       Guidelines for Scoring/Interpretation:  Total score categories:  0-7 = No clinically significant insomnia   8-14 = Subthreshold insomnia   15-21 = Clinical insomnia (moderate severity)  22-28 = Clinical insomnia (severe)  Used via courtesy of www.[employer].va.gov with permission from Gautam Ramirez PhD., [facility]      Allergies:    Allergies   Allergen Reactions     Omeprazole      diarrhea     Pantoprazole      diarrhea       Medications:    Current Outpatient Medications   Medication Sig Dispense Refill     apixaban ANTICOAGULANT (ELIQUIS) 5 MG tablet Take 1 tablet (5 mg) by mouth 2 times daily 180 tablet 3     atorvastatin (LIPITOR) 40 MG tablet TAKE 1 TABLET (40 MG) BY MOUTH DAILY 90 tablet 3     azelastine (ASTELIN) 0.1 % nasal spray USE 2 SPRAYS INTO BOTH NOSTRILS TWO TIMES A DAY 90 mL 3     blood glucose (NO BRAND SPECIFIED) test strip Use to test blood sugar 4 times daily or as directed. 400 each 1     blood glucose calibration (ACCU-CHEK NORMAN) solution USE AS DIRECTED 1 each 0     Continuous Blood Gluc  (FREESTYLE DELFINA 2 READER) CHRYSTAL 1 Device continuous 1 each 1     Continuous Blood Gluc Sensor (FREESTYLE DELFINA 2 SENSOR) [facility] [place] 1 DEVICE TO TEST BLOOD SUGAR, CHANGE EVERY 14 DAYS 6 each 3     fenofibrate micronized (LOFIBRA) 67 MG capsule Take 1 capsule (67 mg) by mouth every morning (before  breakfast) 30 capsule 11     glucose 4 g CHEW Take 1 tablet by mouth every hour as needed for low blood sugar        insulin aspart (NOVOLOG FLEXPEN) 100 UNIT/ML pen INJECT SUBCUTANEOUSLY THREE TIMES A DAY (BEFORE MEALS) 3 UNITS PER CARB CHOICE (15 GM) APPROX 10-15 UNITS/ MEAL 45 mL 1     insulin glargine (LANTUS PEN) 100 UNIT/ML pen Inject 66-68 Units Subcutaneous At Bedtime 75 mL 3     levothyroxine (SYNTHROID/LEVOTHROID) 100 MCG tablet TAKE ONE TABLET BY MOUTH ONCE DAILY 90 tablet 3     nitroGLYcerin (NITROSTAT) 0.4 MG sublingual tablet Place 1 tablet (0.4 mg) under the tongue every 5 minutes as needed for chest pain 25 tablet 1     NOVOFINE AUTOCOVER PEN NEEDLE 30G X 8 MM miscellaneous USE AS DIRECTED FOUR TIMES A  each 2     torsemide (DEMADEX) 10 MG tablet Take 1 tablet (10 mg) by mouth daily 90 tablet 3       Problem List:  Patient Active Problem List    Diagnosis Date Noted     Other cirrhosis of liver (H) 04/14/2022     Priority: Medium     Hyperparathyroidism (H) 11/29/2020     Priority: Medium     CKD (chronic kidney disease) stage 3, GFR 30-59 ml/min (H) 05/20/2019     Priority: Medium     Atrial fibrillation, unspecified type (H) 05/02/2018     Priority: Medium     Type 2 diabetes mellitus without complication, with long-term current use of insulin (H) 02/21/2017     Priority: Medium     Hypothyroidism, unspecified type 08/15/2016     Priority: Medium     Gastroesophageal reflux disease without esophagitis 04/27/2016     Priority: Medium     Atrial flutter (H) 04/15/2016     Priority: Medium     BRANDON (obstructive sleep apnea) 12/07/2012     Priority: Medium     Health Care Home 04/02/2012     Priority: Medium     [facility] for .  Bertha Penaloza RN-BC    [phone]   DX V65.8 REPLACED WITH 83172 HEALTH CARE HOME (04/08/2013)       Vitamin D deficiency 09/03/2011     Priority: Medium     Advanced directives, counseling/discussion 08/25/2011     Priority: Medium     Advance  Directive Problem List Overview:   Name Relationship Phone    Primary Health Care Agent            Alternative Health Care Agent          Patient states has Advance Directive and will bring in a copy to clinic. 8/25/2011          Osteopenia      Priority: Medium     Hyperlipidemia LDL goal <70 05/26/2011     Priority: Medium     Testosterone deficiency 12/08/2009     Priority: Medium     Allergic rhinitis 01/07/2008     Priority: Medium     Problem list name updated by automated process. Provider to review       Coronary atherosclerosis      Priority: Medium     Nuc Stress 4/15/16: On both stress and rest images there is a very small size mild intensity apical photopenic defect which is of equal extent and intensity on both stress and rest images. Gated images demonstrated no regional wall motion abnormalities. The left ventricular ejection fraction was calculated to be 61% with stress.       Impotence of organic origin      Priority: Medium     Essential hypertension, benign 01/03/2003     Priority: Medium        Past Medical/Surgical History:  Past Medical History:   Diagnosis Date     Adhesive capsulitis of shoulder      Anemia 3/10/2014     Anemia, unspecified      Antiplatelet or antithrombotic long-term use      Arrhythmia     Atrial fib/flutter     CAD (coronary artery disease)      History of cardioversion 04/24/2018    a single synchronized shock of 120 joules restored normal sinus rhythm     History of cardioversion 02/13/2019    single shock , 200 joules successul in restoring NSR     Hyperlipidemia LDL goal <70 5/26/2011     Hypertension      Hypothyroidism      Impotence of organic origin      Laceration of finger  June 2010     left thumb s/p sutures     Numbness and tingling     diabetic neuropathy bilateral feet     BRANDON (obstructive sleep apnea)     intolerant of CPAP, uses it occasionally.  Using mandibular device occasionally     Osteopenia      Pulmonary hypertension (H) 4/6/2012     Sensorineural  hearing loss, unspecified      Stented coronary artery 1997    CABG      Testosterone deficiency      Type 2 diabetes mellitus without complication  (goal A1C<8) 10/24/2015     Typical atrial flutter (H) 4/18/16     Unspecified hypothyroidism      Vitamin D deficiency 9/3/2011     Past Surgical History:   Procedure Laterality Date     ANESTHESIA CARDIOVERSION N/A 02/13/2019    Procedure: ANESTHESIA CARDIOVERSION;  Surgeon: GENERIC ANESTHESIA PROVIDER;  Location:  OR     ANESTHESIA CARDIOVERSION N/A 05/22/2019    Procedure: ANESTHESIA, FOR CARDIOVERSION;  Surgeon: GENERIC ANESTHESIA PROVIDER;  Location:  OR     CARDIAC SURGERY      bypass in 1997     CARDIOVERSION  04/24/2018     COLONOSCOPY       CORONARY ANGIOGRAPHY ADULT ORDER      4/2/2012     CORONARY ARTERY BYPASS  1997    [facility]     ENDOSCOPIC ULTRASOUND UPPER GASTROINTESTINAL TRACT (GI) N/A 03/14/2019    Procedure: ENDOSCOPIC ULTRASOUND OF UPPER GASTROINTESTINAL TRACT;  Surgeon: Ju Torres MD;  Location:  OR     ESOPHAGOSCOPY, GASTROSCOPY, DUODENOSCOPY (EGD), COMBINED N/A 4/15/2022    Procedure: ESOPHAGOGASTRODUODENOSCOPY (EGD);  Surgeon: Erik Baca DO;  Location:  GI     EXCISE MASS HEAD Left 08/18/2016    Procedure: EXCISE MASS HEAD;  Surgeon: Ivan Hernandez MD;  Location:  SD     HEART CATH, ANGIOPLASTY  04/2012     LAPAROSCOPIC CHOLECYSTECTOMY N/A 03/17/2019    Procedure: LAPAROSCOPIC CHOLECYSTECTOMY;  Surgeon: Janel Paz MD;  Location:  OR     PHACOEMULSIFICATION CLEAR CORNEA WITH STANDARD INTRAOCULAR LENS IMPLANT Left 06/08/2015    Procedure: PHACOEMULSIFICATION CLEAR CORNEA WITH STANDARD INTRAOCULAR LENS IMPLANT;  Surgeon: Nixon Farnsworth MD;  Location:  EC     PHACOEMULSIFICATION CLEAR CORNEA WITH STANDARD INTRAOCULAR LENS IMPLANT Right 06/22/2015    Procedure: PHACOEMULSIFICATION CLEAR CORNEA WITH STANDARD INTRAOCULAR LENS IMPLANT;  Surgeon: Nixon Farnsworth MD;   Location:  EC     SEPTOPLASTY, TURBINOPLASTY, COMBINED Bilateral 2016    Procedure: COMBINED SEPTOPLASTY, TURBINOPLASTY;  Surgeon: Ivan Hernandez MD;  Location: [facility] NONSPECIFIC PROCEDURE      CABG (Congregational)     [facility] NONSPECIFIC PROCEDURE  2001    stress echo       Social History:  Social History     Socioeconomic History     Marital status:      Spouse name: Not on file     Number of children: Not on file     Years of education: Not on file     Highest education level: Not on file   Occupational History     Not on file   Tobacco Use     Smoking status: Former Smoker     Packs/day: 1.00     Years: 6.00     Pack years: 6.00     Types: Cigarettes     Start date:      Quit date: 1964     Years since quittin.8     Smokeless tobacco: Never Used   Substance and Sexual Activity     Alcohol use: Yes     Comment: couple drinks a year     Drug use: No     Sexual activity: Yes     Partners: Female   Other Topics Concern     Parent/sibling w/ CABG, MI or angioplasty before 65F 55M? Not Asked      Service Not Asked     Blood Transfusions Not Asked     Caffeine Concern Yes     Comment: 2 cups coffee a day     Occupational Exposure Not Asked     Hobby Hazards Not Asked     Sleep Concern Yes     Comment: sleep apnea, wears cpap off and on     Stress Concern No     Weight Concern No     Special Diet Yes     Comment: diabetic diet      Back Care Not Asked     Exercise No     Comment: occ walk the mall     Bike Helmet Not Asked     Seat Belt Yes     Self-Exams Not Asked   Social History Narrative     Not on file     Social Determinants of Health     Financial Resource Strain: Not on file   Food Insecurity: Not on file   Transportation Needs: Not on file   Physical Activity: Not on file   Stress: Not on file   Social Connections: Not on file   Intimate Partner Violence: Not on file   Housing Stability: Not on file       Family History:  Family History   Problem Relation Age of  "Onset     Genitourinary Problems Father         d: age 89; ARF     Hypertension Father      Diabetes Mother         d: age 68, CAD     Heart Disease Mother         MI     Myocardial Infarction Mother      Cardiovascular Brother         d:  age 31; CAD     Heart Disease Brother         age 31, MI     Diabetes Sister         b:  1939; DM2       Review of Systems:  In the last TWO WEEKS have you experienced any of the following symptoms?  Sore Throat in Morning: Yes  Dry Mouth in the Morning: Yes  Shortness of Breath Lying Flat: No  Shortness of Breath With Activity: Yes  Awakening with Shortness of Breath: No  Increased Cough: No  Heart Racing at Night: No  Swelling in Feet or Legs: No  Diarrhea at Night: No  Heartburn at Night: No  Urinating More than Once at Night: Yes  Losing Control of Urine at Night: No  Joint Pains at Night: No  Headaches in Morning: No  Weakness in Arms or Legs: No  Depressed Mood: No  Anxiety: No     Physical Examination:  Vitals: /82   Pulse 81   Ht 1.803 m (5' 11\")   Wt 91.9 kg (202 lb 9.6 oz)   SpO2 96%   BMI 28.26 kg/m    BMI= Body mass index is 28.26 kg/m .  General appearance: Awake, alert, cooperative. Well groomed. Sitting comfortably in chair. In no apparent distress.  HEENT: Head: Normocephalic, atraumatic. Eyes: PERRL. Conjunctiva clear. Sclera normal. Nose: External appearance without deformity, internal exam deferred. Oropharynx: Mallampati II. Tonsils not visualized. Uvula midline.   Neck: Neck Cir (cm): 46 cm (5/5/2022 12:55 PM)  Cardiovascular: Regular rate and rhythm. No pedal edema.  Pulmonary:  Chest symmetric. Clear to auscultation bilaterally. No crackles, wheezes or rales.  Skin:  Warm, dry, intact. No rashes or significant lesions.   Neurologic: Alert, oriented x3. No focal neurological deficit. Gait normal.   Psychiatric: Mood euthymic. Affect congruent with full range and intensity.          Data: All pertinent previous laboratory data reviewed     Recent " Labs   Lab Test 03/29/22  1352 02/19/22  1010    140   POTASSIUM 4.3 3.7   CHLORIDE 108 107   CO2 27 31   ANIONGAP 5 2*   * 136*   BUN 25 16   CR 1.50* 1.35*   STARR 11.0* 10.6*       Recent Labs   Lab Test 03/29/22  1352   WBC 4.6   RBC 4.13*   HGB 11.5*   HCT 35.6*   MCV 86   MCH 27.8   MCHC 32.3   RDW 14.5          Recent Labs   Lab Test 02/19/22  1010   PROTTOTAL 7.3   ALBUMIN 4.0   BILITOTAL 0.7   ALKPHOS 86   AST 25   ALT 37       TSH (mU/L)   Date Value   12/15/2021 3.27   09/25/2020 3.01   04/13/2020 0.10 (L)       No results found for: UAMP, UBARB, BENZODIAZEUR, UCANN, UCOC, OPIT, UPCP    Iron Saturation Index   Date/Time Value Ref Range Status   10/23/2017 08:32 AM 29 15 - 46 % Final     Iron Sat Index   Date/Time Value Ref Range Status   03/29/2022 01:52 PM 15 15 - 46 % Final     Ferritin   Date/Time Value Ref Range Status   01/10/2022 03:28  26 - 388 ng/mL Final   10/23/2017 08:32 AM 93 26 - 388 ng/mL Final       No results found for: PH, PHARTERIAL, PO2, [id number], SAT, PCO2, HCO3, BASEEXCESS, ALLI, BEB    Echocardiology 12/26/2021 :   Procedure  Complete Portable Echo Adult.  ______________________________________________________________________________  Interpretation Summary     1. Left ventricular systolic function is normal. The visual ejection fraction  is 60-65%.  2. Flattened septum is consistent with RV pressure/volume overload.  3. The right ventricle is severely dilated. Moderately decreased right  ventricular systolic function.  4. There is severe biatrial dilatation.  5. Mild aortic stenosis; mean gradient 15 mmHg, peak velocity 2.6 m/sec,  calculated valve area 1.4 cm2.  6. Severe pulmnary hypertension; The right ventricular systolic pressure is  approximated at 70mmHg plus the right atrial pressure. IVC diameter >2.1 cm  collapsing <50% with sniff suggests a high RA pressure estimated at 15 mmHg or  greater.  7. In comparison with the prior studies, severe  pulmonary hypertension is  present with evidence for cor pulmonale.      Chest x-ray: XR Chest 2 Views 02/19/2022    Narrative  EXAM: XR CHEST 2 VIEW  LOCATION: [facility]  DATE/TIME: 02/19/2022, 10:15 AM    INDICATION: A-fib.  COMPARISON: Chest x-ray on 12/25/2021.    Impression  IMPRESSION: PA and lateral views of the chest were obtained. Postsurgical changes of cardiac surgery with median sternotomy wires and surgical clips. Cardiomediastinal silhouette is within normal limits. No suspicious focal pulmonary opacities. No  significant pleural effusion or pneumothorax.      Chest CT: No results found for this or any previous visit from the past 365 days.      PFT: Most Recent [facility] Pulmonary Function Testing    FVC-Pred   Date Value Ref Range Status   07/09/2019 4.13 L      FVC-Pre   Date Value Ref Range Status   07/09/2019 3.47 L      FVC-%Pred-Pre   Date Value Ref Range Status   07/09/2019 83 %      FEV1-Pre   Date Value Ref Range Status   07/09/2019 2.99 L      FEV1-%Pred-Pre   Date Value Ref Range Status   07/09/2019 97 %      FEV1FVC-Pred   Date Value Ref Range Status   07/09/2019 75 %      FEV1FVC-Pre   Date Value Ref Range Status   07/09/2019 86 %      No results found for: 20029  FEFMax-Pred   Date Value Ref Range Status   07/09/2019 7.83 L/sec      FEFMax-Pre   Date Value Ref Range Status   07/09/2019 9.21 L/sec      FEFMax-%Pred-Pre   Date Value Ref Range Status   07/09/2019 117 %      ExpTime-Pre   Date Value Ref Range Status   07/09/2019 6.45 sec      FIFMax-Pre   Date Value Ref Range Status   07/09/2019 3.31 L/sec      FEV1FEV6-Pred   Date Value Ref Range Status   07/09/2019 77 %      FEV1FEV6-Pre   Date Value Ref Range Status   07/09/2019 86 %      No results found for: 20055      Chrissy Goddard PA-C 5/5/2022       [facility]  [address]rd Floor  [address], [place] [address]     [facility]  [address], [place] [address]    Chart documentation was completed, in part, with [employer] voice-recognition software. Even though reviewed, some grammatical, spelling, and word errors may remain.    64 minutes spent on day of encounter reviewing medical records, history and physical examination, review of previous testing and interpretation, documentation and further activities as noted above

## 2022-05-05 NOTE — NURSING NOTE
"Chief Complaint   Patient presents with     Sleep Apnea     Reestablished care hasn't used cpap in the last 15 years.PCP referred       Initial /82   Pulse 81   Ht 1.803 m (5' 11\")   Wt 91.9 kg (202 lb 9.6 oz)   SpO2 96%   BMI 28.26 kg/m   Estimated body mass index is 28.26 kg/m  as calculated from the following:    Height as of this encounter: 1.803 m (5' 11\").    Weight as of this encounter: 91.9 kg (202 lb 9.6 oz).    Medication Reconciliation: complete  ESS:5  Neck circumference:46 centimeters.    Lisa Lorenzo CNA    "

## 2022-05-05 NOTE — PATIENT INSTRUCTIONS
"      MY TREATMENT INFORMATION FOR SLEEP APNEA-  Pascual ALEXANDRA Eliazar    Am I having a sleep study at a sleep center?  --->Due to normal delays, you will be contacted within 2-4 weeks to schedule    Frequently asked questions:  1. What is Obstructive Sleep Apnea (BRANDON)? BRANDON is the most common type of sleep apnea. Apnea means, \"without breath.\"  Apnea is most often caused by narrowing or collapse of the upper airway as muscles relax during sleep.   Almost everyone has occasional apneas. Most people with sleep apnea have had brief interruptions at night frequently for many years.  The severity of sleep apnea is related to how frequent and severe the events are.   2. What are the consequences of BRANDON? Symptoms include: feeling sleepy during the day, snoring loudly, gasping or stopping of breathing, trouble sleeping, and occasionally morning headaches or heartburn at night.  Sleepiness can be serious and even increase the risk of falling asleep while driving. Other health consequences may include development of high blood pressure and other cardiovascular disease in persons who are susceptible. Untreated BRANDON  can contribute to heart disease, stroke and diabetes.   3. What are the treatment options? In most situations, sleep apnea is a lifelong disease that must be managed with daily therapy. Medications are not effective for sleep apnea and surgery is generally not considered until other therapies have been tried. Your treatment is your choice . Continuous Positive Airway (CPAP) works right away and is the therapy that is effective in nearly everyone. An oral device to hold your jaw forward is usually the next most reliable option. Other options include postioning devices (to keep you off your back), weight loss, and surgery including a tongue pacing device. There is more detail about some of these options below.  4. Are my sleep studies covered by insurance? Although we will request verification of coverage, we advise you " also check in advance of the study to ensure there is coverage.    Important tips for those choosing CPAP and similar devices   Know your equipment:  CPAP is continuous positive airway pressure that prevents obstructive sleep apnea by keeping the throat from collapsing while you are sleeping. In most cases, the device is  smart  and can slowly self-adjusts if your throat collapses and keeps a record every day of how well you are treated-this information is available to you and your care team.  BPAP is bilevel positive airway pressure that keeps your throat open and also assists each breath with a pressure boost to maintain adequate breathing.  Special kinds of BPAP are used in patients who have inadequate breathing from lung or heart disease. In most cases, the device is  smart  and can slowly self-adjusts to assist breathing. Like CPAP, the device keeps a record of how well you are treated.  Your mask is your connection to the device. You get to choose what feels most comfortable and the staff will help to make sure if fits. Here: are some examples of the different masks that are available:       Key points to remember on your journey with sleep apnea:  Sleep study.  PAP devices often need to be adjusted during a sleep study to show that they are effective and adjusted right.  Good tips to remember: Try wearing just the mask during a quiet time during the day so your body adapts to wearing it. A humidifier is recommended for comfort in most cases to prevent drying of your nose and throat. Allergy medication from your provider may help you if you are having nasal congestion.  Getting settled-in. It takes more than one night for most of us to get used to wearing a mask. Try wearing just the mask during a quiet time during the day so your body adapts to wearing it. A humidifier is recommended for comfort in most cases. Our team will work with you carefully on the first day and will be in contact within 4 days and  again at 2 and 4 weeks for advice and remote device adjustments. Your therapy is evaluated by the device each day.   Use it every night. The more you are able to sleep naturally for 7-8 hours, the more likely you will have good sleep and to prevent health risks or symptoms from sleep apnea. Even if you use it 4 hours it helps. Occasionally all of us are unable to use a medical therapy, in sleep apnea, it is not dangerous to miss one night.   Communicate. Call our skilled team on the number provided on the first day if your visit for problems that make it difficult to wear the device. Over 2 out of 3 patients can learn to wear the device long-term with help from our team. Remember to call our team or your sleep providers if you are unable to wear the device as we may have other solutions for those who cannot adapt to mask CPAP therapy. It is recommended that you sleep your sleep provider within the first 3 months and yearly after that if you are not having problems.   Use it for your health. We encourage use of CPAP masks during daytime quiet periods to allow your face and brain to adapt to the sensation of CPAP so that it will be a more natural sensation to awaken to at night or during naps. This can be very useful during the first few weeks or months of adapting to CPAP though it does not help medically to wear CPAP during wakefulness and  should not be used as a strategy just to meet guidelines.  Take care of your equipment. Make sure you clean your mask and tubing using directions every day and that your filter and mask are replaced as recommended or if they are not working.     BESIDES CPAP, WHAT OTHER THERAPIES ARE THERE?    Positioning Device  Positioning devices are generally used when sleep apnea is mild and only occurs on your back.This example shows a pillow that straps around the waist. It may be appropriate for those whose sleep study shows milder sleep apnea that occurs primarily when lying flat on  one's back. Preliminary studies have shown benefit but effectiveness at home may need to be verified by a home sleep test. These devices are generally not covered by medical insurance.  Examples of devices that maintain sleeping on the back to prevent snoring and mild sleep apnea.    Belt type body positioner  http://CAPNIA.Qoture/    Electronic reminder  http://nightshifttherapy.com/  http://[email or web address].com.au/      Oral Appliance  What is oral appliance therapy?  An oral appliance device fits on your teeth at night like a retainer used after having braces. The device is made by a specialized dentist and requires several visits over 1-2 months before a manufactured device is made to fit your teeth and is adjusted to prevent your sleep apnea. Once an oral device is working properly, snoring should be improved. A home sleep test may be recommended at that time if to determine whether the sleep apnea is adequately treated.       Some things to remember:  -Oral devices are often, but not always, covered by your medical insurance. Be sure to check with your insurance provider.   -If you are referred for oral therapy, you will be given a list of specialized dentists to consider or you may choose to visit the Web site of the American Academy of Dental Sleep Medicine  -Oral devices are less likely to work if you have severe sleep apnea or are extremely overweight.     More detailed information  An oral appliance is a small acrylic device that fits over the upper and lower teeth  (similar to a retainer or a mouth guard). This device slightly moves jaw forward, which moves the base of the tongue forward, opens the airway, improves breathing for effective treat snoring and obstructive sleep apnea in perhaps 7 out of 10 people .  The best working devices are custom-made by a dental device  after a mold is made of the teeth 1, 2, 3.  When is an oral appliance indicated?  Oral appliance therapy is recommended as a  first-line treatment for patients with primary snoring, mild sleep apnea, and for patients with moderate sleep apnea who prefer appliance therapy to use of CPAP4, 5. Severity of sleep apnea is determined by sleep testing and is based on the number of respiratory events per hour of sleep.   How successful is oral appliance therapy?  The success rate of oral appliance therapy in patients with mild sleep apnea is 75-80% while in patients with moderate sleep apnea it is 50-70%. The chance of success in patients with severe sleep apnea is 40-50%. The research also shows that oral appliances have a beneficial effect on the cardiovascular health of BRANDON patients at the same magnitude as CPAP therapy7.  Oral appliances should be a second-line treatment in cases of severe sleep apnea, but if not completely successful then a combination therapy utilizing CPAP plus oral appliance therapy may be effective. Oral appliances tend to be effective in a broad range of patients although studies show that the patients who have the highest success are females, younger patients, those with milder disease, and less severe obesity. 3, 6.   Finding a dentist that practices dental sleep medicine  Specific training is available through the American Academy of Dental Sleep Medicine for dentists interested in working in the field of sleep. To find a dentist who is educated in the field of sleep and the use of oral appliances, near you, visit the Web site of the American Academy of Dental Sleep Medicine.    References  1. Jamaal et al. Objectively measured vs self-reported compliance during oral appliance therapy for sleep-disordered breathing. Chest 2013; 144(5): 8661-9888.  2. Digna et al. Objective measurement of compliance during oral appliance therapy for sleep-disordered breathing. Thorax 2013; 68(1): 91-96.  3. Akhil et al. Mandibular advancement devices in 620 men and women with BRANDON and snoring: tolerability and predictors  of treatment success. Chest 2004; 125: 4381-0951.  4. Dania et al. Oral appliances for snoring and BRANDON: a review. Sleep 2006; 29: 244-262.  5. Yemi et al. Oral appliance treatment for BRANDON: an update. J Clin Sleep Med 2014; 10(2): 215-227.  6. Cynthia et al. Predictors of OSAH treatment outcome. J Dent Res 2007; 86: 8140-1791.      Weight Loss:    Weight loss is a long-term strategy that may improve sleep apnea in some patients.    Weight management is a personal decision and the decision should be based on your interest and the potential benefits.  If you are interested in exploring weight loss strategies, the following discussion covers the impact on weight loss on sleep apnea and the approaches that may be successful.    Being overweight does not necessarily mean you will have health consequences.  Those who have BMI over 35 or over 27 with existing medical conditions carries greater risk.   Weight loss decreases severity of sleep apnea in most people with obesity. For those with mild obesity who have developed snoring with weight gain, even 15-30 pound weight loss can improve and occasionally eliminate sleep apnea.  Structured and life-long dietary and health habits are necessary to lose weight and keep healthier weight levels.     Though there may be significant health benefits from weight loss, long-term weight loss is very difficult to achieve- studies show success with dietary management in less than 10% of people. In addition, substantial weight loss may require years of dietary control and may be difficult if patients have severe obesity. In these cases, surgical management may be considered.  Finally, older individuals who have tolerated obesity without health complications may be less likely to benefit from weight loss strategies.      [unfilled]    Surgery:    Surgery for obstructive sleep apnea is considered generally only when other therapies fail to work. Surgery may be discussed with you  if you are having a difficult time tolerating CPAP and or when there is an abnormal structure that requires surgical correction.  Nose and throat surgeries often enlarge the airway to prevent collapse.  Most of these surgeries create pain for 1-2 weeks and up to half of the most common surgeries are not effective throughout life.  You should carefully discuss the benefits and drawbacks to surgery with your sleep provider and surgeon to determine if it is the best solution for you.   More information  Surgery for BRANDON is directed at areas that are responsible for narrowing or complete obstruction of the airway during sleep.  There are a wide range of procedures available to enlarge and/or stabilize the airway to prevent blockage of breathing in the three major areas where it can occur: the palate, tongue, and nasal regions.  Successful surgical treatment depends on the accurate identification of the factors responsible for obstructive sleep apnea in each person.  A personalized approach is required because there is no single treatment that works well for everyone.  Because of anatomic variation, consultation with an examination by a sleep surgeon is a critical first step in determining what surgical options are best for each patient.  In some cases, examination during sedation may be recommended in order to guide the selection of procedures.  Patients will be counseled about risks and benefits as well as the typical recovery course after surgery. Surgery is typically not a cure for a person s BRANDON.  However, surgery will often significantly improve one s BRANDON severity (termed  success rate ).  Even in the absence of a cure, surgery will decrease the cardiovascular risk associated with OSA7; improve overall quality of life8 (sleepiness, functionality, sleep quality, etc).      Palate Procedures:  Patients with BRANDON often have narrowing of their airway in the region of their tonsils and uvula.  The goals of palate  procedures are to widen the airway in this region as well as to help the tissues resist collapse.  Modern palate procedure techniques focus on tissue conservation and soft tissue rearrangement, rather than tissue removal.  Often the uvula is preserved in this procedure. Residual sleep apnea is common in patient after pharyngoplasty with an average reduction in sleep apnea events of 33%2.      Tongue Procedures:  ExamWhile patients are awake, the muscles that surround the throat are active and keep this region open for breathing. These muscles relax during sleep, allowing the tongue and other structures to collapse and block breathing.  There are several different tongue procedures available.  Selection of a tongue base procedure depends on characteristics seen on physical exam.  Generally, procedures are aimed at removing bulky tissues in this area or preventing the back of the tongue from falling back during sleep.  Success rates for tongue surgery range from 50-62%3.    Hypoglossal Nerve Stimulation:  Hypoglossal nerve stimulation has recently received approval from the [place] Food and Drug Administration for the treatment of obstructive sleep apnea.  This is based on research showing that the system was safe and effective in treating sleep apnea6.  Results showed that the median AHI score decreased 68%, from 29.3 to 9.0. This therapy uses an implant system that senses breathing patterns and delivers mild stimulation to airway muscles, which keeps the airway open during sleep.  The system consists of three fully implanted components: a small generator (similar in size to a pacemaker), a breathing sensor, and a stimulation lead.  Using a small handheld remote, a patient turns the therapy on before bed and off upon awakening.    Candidates for this device must be greater than 22 years of age, have moderate to severe BRANDON (AHI between 20-65), BMI less than 32, have tried CPAP/oral appliance without success, and  have appropriate upper airway anatomy (determined by a sleep endoscopy performed by Dr. Granados).    Hypoglossal Nerve Stimulation Pathway:    The sleep surgeon s office will work with the patient through the insurance prior-authorization process (including communications and appeals).    Nasal Procedures:  Nasal obstruction can interfere with nasal breathing during the day and night.  Studies have shown that relief of nasal obstruction can improve the ability of some patients to tolerate positive airway pressure therapy for obstructive sleep apnea1.  Treatment options include medications such as nasal saline, topical corticosteroid and antihistamine sprays, and oral medications such as antihistamines or decongestants. Non-surgical treatments can include external nasal dilators for selected patients. If these are not successful by themselves, surgery can improve the nasal airway either alone or in combination with these other options.      Combination Procedures:  Combination of surgical procedures and other treatments may be recommended, particularly if patients have more than one area of narrowing or persistent positional disease.  The success rate of combination surgery ranges from 66-80%2,3.    References  Anjelica CRUZ. The Role of the Nose in Snoring and Obstructive Sleep Apnoea: An Update.  Eur Arch Otorhinolaryngol. 2011; 268: 1365-73.   Capsachin SM; Patricia JA; Maciej JR; Pallanch JF; Jerri MB; Kristi SG; Raheem TUCKER. Surgical modifications of the upper airway for obstructive sleep apnea in adults: a systematic review and meta-analysis. SLEEP 2010;33(10):0107-6917. [place] ALEXANDRA. Hypopharyngeal surgery in obstructive sleep apnea: an evidence-based medicine review.  Arch Otolaryngol Head Neck Surg. 2006 Feb;132(2):206-13.  William YH1, Monica Y, Avinash OLYA. The efficacy of anatomically based multilevel surgery for obstructive sleep apnea. Otolaryngol Head Neck Surg. 2003 Oct;129(4):327-35.  [place] ALEXANDRA, Goldberg A.  Hypopharyngeal Surgery in Obstructive Sleep Apnea: An Evidence-Based Medicine Review. Arch Otolaryngol Head Neck Surg. 2006 Feb;132(2):206-13.  Yuli TALLEY et al. Upper-Airway Stimulation for Obstructive Sleep Apnea.  N Engl J Med. 2014 Jan 9;370(2):139-49.  Lexus Y et al. Increased Incidence of Cardiovascular Disease in Middle-aged Men with Obstructive Sleep Apnea. Am J Respir Crit Care Med; 2002 166: 159-165  Garcia EM et al. Studying Life Effects and Effectiveness of Palatopharyngoplasty (SLEEP) study: Subjective Outcomes of Isolated Uvulopalatopharyngoplasty. Otolaryngol Head Neck Surg. 2011; 144: 623-631.        WHAT IF I ONLY HAVE SNORING?    Mandibular advancement devices, lateral sleep positioning, long-term weight loss and treatment of nasal allergies have been shown to improve snoring.  Exercising tongue muscles with a game (https://www.ncbi.nlm.nih.gov/pubmed/96977459) or stimulating the tongue during the day with a device (https://doi.org/10.3390/seh86689805) have improved snoring in some individuals.    Remember to Drive Safe... Drive Alive     Sleep health profoundly affects your health, mood, and your safety.  Thirty three percent of the population (one in three of us) is not getting enough sleep and many have a sleep disorder. Not getting enough sleep or having an untreated / undertreated sleep condition may make us sleepy without even knowing it. In fact, our driving could be dramatically impaired due to our sleep health. As your provider, here are some things I would like you to know about driving:     Here are some warning signs for impairment and dangerous drowsy driving:              -Having been awake more than 16 hours               -Looking tired               -Eyelid drooping              -Head nodding (it could be too late at this point)              -Driving for more than 30 minutes     Some things you could do to make the driving safer if you are experiencing some drowsiness:               -Stop driving and rest              -Call for transportation              -Make sure your sleep disorder is adequately treated     Some things that have been shown NOT to work when experiencing drowsiness while driving:              -Turning on the radio              -Opening windows              -Eating any  distracting  /  entertaining  foods (e.g., sunflower seeds, candy, or any other)              -Talking on the phone      Your decision may not only impact your life, but also the life of others. Please, remember to drive safe for yourself and all of us.

## 2022-05-09 DIAGNOSIS — E11.9 TYPE 2 DIABETES MELLITUS WITHOUT COMPLICATION, WITH LONG-TERM CURRENT USE OF INSULIN (H): ICD-10-CM

## 2022-05-09 DIAGNOSIS — Z79.4 TYPE 2 DIABETES MELLITUS WITHOUT COMPLICATION, WITH LONG-TERM CURRENT USE OF INSULIN (H): ICD-10-CM

## 2022-05-10 RX ORDER — INSULIN ASPART 100 [IU]/ML
INJECTION, SOLUTION INTRAVENOUS; SUBCUTANEOUS
Qty: 45 ML | Refills: 1 | Status: SHIPPED | OUTPATIENT
Start: 2022-05-10 | End: 2022-11-02

## 2022-05-24 ENCOUNTER — TRANSFERRED RECORDS (OUTPATIENT)
Dept: HEALTH INFORMATION MANAGEMENT | Facility: CLINIC | Age: 82
End: 2022-05-24
Payer: COMMERCIAL

## 2022-05-24 LAB — RETINOPATHY: NEGATIVE

## 2022-05-30 DIAGNOSIS — E78.5 HYPERLIPIDEMIA LDL GOAL <70: ICD-10-CM

## 2022-06-01 RX ORDER — FENOFIBRATE 67 MG/1
CAPSULE ORAL
Qty: 30 CAPSULE | Refills: 8 | Status: SHIPPED | OUTPATIENT
Start: 2022-06-01 | End: 2023-02-28

## 2022-06-07 ENCOUNTER — HOSPITAL ENCOUNTER (OUTPATIENT)
Dept: NUCLEAR MEDICINE | Facility: CLINIC | Age: 82
Setting detail: NUCLEAR MEDICINE
Discharge: HOME | End: 2022-06-07
Attending: NURSE PRACTITIONER | Admitting: NURSE PRACTITIONER
Payer: COMMERCIAL

## 2022-06-07 DIAGNOSIS — K76.0 NONALCOHOLIC FATTY LIVER DISEASE: ICD-10-CM

## 2022-06-07 DIAGNOSIS — R18.8 OTHER ASCITES: ICD-10-CM

## 2022-06-07 DIAGNOSIS — R74.01 ELEVATED ALT MEASUREMENT: ICD-10-CM

## 2022-06-07 DIAGNOSIS — R10.10 PAIN OF UPPER ABDOMEN: ICD-10-CM

## 2022-06-07 PROCEDURE — 343N000001 HC RX 343: Performed by: NURSE PRACTITIONER

## 2022-06-07 PROCEDURE — 78264 GASTRIC EMPTYING IMG STUDY: CPT

## 2022-06-07 PROCEDURE — A9541 TC99M SULFUR COLLOID: HCPCS | Performed by: NURSE PRACTITIONER

## 2022-06-07 RX ADMIN — Medication 1 MILLICURIE: at 07:20

## 2022-06-08 PROBLEM — K31.84 GASTROPARESIS: Status: ACTIVE | Noted: 2022-06-08

## 2022-06-09 ENCOUNTER — TRANSFERRED RECORDS (OUTPATIENT)
Dept: HEALTH INFORMATION MANAGEMENT | Facility: CLINIC | Age: 82
End: 2022-06-09
Payer: COMMERCIAL

## 2022-06-09 ENCOUNTER — TELEPHONE (OUTPATIENT)
Dept: CARDIOLOGY | Facility: CLINIC | Age: 82
End: 2022-06-09
Payer: COMMERCIAL

## 2022-06-09 DIAGNOSIS — I50.30 HEART FAILURE WITH PRESERVED EJECTION FRACTION, NYHA CLASS II (H): ICD-10-CM

## 2022-06-09 RX ORDER — TORSEMIDE 10 MG/1
10 TABLET ORAL DAILY
Qty: 1 TABLET | Refills: 0 | COMMUNITY
Start: 2022-01-10 | End: 2022-06-21

## 2022-06-09 NOTE — TELEPHONE ENCOUNTER
"Received MyChart message from Dayday today      Writer has reviewed his chart documentation.  I called and spoke with Dayday.  He states that he has not had any changes in his diet, or his medications for months.  He is taking them as directed.  Writer did review cardiology notes, and found that apmnt on Jordan 10, 2022, Valentine Boyd did change his Torsemide 10mg to every OTHER day instead of daily.  Writer has now made that change on his medication list.    Dayday denies changes in his breathing, ability to perform ADLs, no difference in how clothing fits, feels about the same as usual, he is frustrated with his lack of energy at times.      Dayday reports that he had a recent test 2 days ago, which shows that \"my stomach does not empty very well\".  He does have an established relationship with [facility], and is follow up with them as well as his PCP, Dr. Elizalde.    Writer has instructed Dayday to call 911 if he continues to have chest pain\pressure at rest, and if his breathing changes at all.  Informed him that they can make an assessment and help determine if it's safe for him to remain at home, or if he does need immediate medical care.   Dayday does have atrial fib and some heart failure going on, as well as all his other co morbidities.  Dayday verbalized understanding of this.    Writer will collaborate with providers here, and help determine if Dayday needs to take an extra dose or two of diuretic, or if we should look for an appointment for him to come in, or he maybe can be seen at the PCP earlier than 6\21.   He does have the sleep study scheduled as well.       He was instructed to cancel his heart clinic apmnt with Dr. Adams this spring, because of difficulty in getting the sleep study scheduled in a timely manner.   Now that the sleep study is scheduled, writer will assist Dayday in trying to get an appointment with Dr. Adams or one of the APPs in a follow up plan.  Writer told Dayday that I will call him back tomorrow with what I learn and " offer him options.    Krystyna Prabhakar RN on 6/9/2022 at 4:37 PM

## 2022-06-10 NOTE — TELEPHONE ENCOUNTER
spoke with Dr. Slaughter.  He recommends that Dayday take the diuretic, Torsemide, every day through Monday.   should check in again with Dayday on Monday, and review any symptoms and weight change at that time.     has called and spoken with Dayday at 11 am.  Dayday verbalized understanding with read back to writer.  He states he is good with this plan, and again will call 911 if he feels worse or has chest pain\pressure or change in breathing.     has sent communication to our team for call on Monday.    Krystyna Prabhakar RN on 6/10/2022 at 11:02 AM

## 2022-06-11 ENCOUNTER — TELEPHONE (OUTPATIENT)
Dept: INTERNAL MEDICINE | Facility: CLINIC | Age: 82
End: 2022-06-11
Payer: COMMERCIAL

## 2022-06-11 DIAGNOSIS — N18.32 STAGE 3B CHRONIC KIDNEY DISEASE (H): ICD-10-CM

## 2022-06-11 DIAGNOSIS — E83.52 HYPERCALCEMIA: ICD-10-CM

## 2022-06-11 DIAGNOSIS — E78.5 HYPERLIPIDEMIA LDL GOAL <70: ICD-10-CM

## 2022-06-11 DIAGNOSIS — Z79.4 TYPE 2 DIABETES MELLITUS WITHOUT COMPLICATION, WITH LONG-TERM CURRENT USE OF INSULIN (H): Primary | ICD-10-CM

## 2022-06-11 DIAGNOSIS — E11.9 TYPE 2 DIABETES MELLITUS WITHOUT COMPLICATION, WITH LONG-TERM CURRENT USE OF INSULIN (H): Primary | ICD-10-CM

## 2022-06-11 DIAGNOSIS — D64.9 ANEMIA, UNSPECIFIED TYPE: ICD-10-CM

## 2022-06-12 NOTE — TELEPHONE ENCOUNTER
Patient scheduled to see me on 6/21/2022.  Please call patient and schedule a nonfasting lab appointment earlier in the week of 6/13/2022 so that all results will be back by the time I see patient in clinic on 6/21.  Labs ordered

## 2022-06-15 ENCOUNTER — LAB (OUTPATIENT)
Dept: LAB | Facility: CLINIC | Age: 82
End: 2022-06-15
Payer: COMMERCIAL

## 2022-06-15 DIAGNOSIS — D64.9 ANEMIA, UNSPECIFIED TYPE: ICD-10-CM

## 2022-06-15 DIAGNOSIS — E83.52 HYPERCALCEMIA: ICD-10-CM

## 2022-06-15 LAB
ALBUMIN SERPL-MCNC: 4.2 G/DL (ref 3.4–5)
ALP SERPL-CCNC: 93 U/L (ref 40–150)
ALT SERPL W P-5'-P-CCNC: 35 U/L (ref 0–70)
ANION GAP SERPL CALCULATED.3IONS-SCNC: <1 MMOL/L (ref 3–14)
AST SERPL W P-5'-P-CCNC: 27 U/L (ref 0–45)
BILIRUB SERPL-MCNC: 0.8 MG/DL (ref 0.2–1.3)
BUN SERPL-MCNC: 25 MG/DL (ref 7–30)
CALCIUM SERPL-MCNC: 10.3 MG/DL (ref 8.5–10.1)
CHLORIDE BLD-SCNC: 107 MMOL/L (ref 94–109)
CO2 SERPL-SCNC: 32 MMOL/L (ref 20–32)
CREAT SERPL-MCNC: 1.4 MG/DL (ref 0.66–1.25)
ERYTHROCYTE [DISTWIDTH] IN BLOOD BY AUTOMATED COUNT: 13.8 % (ref 10–15)
GFR SERPL CREATININE-BSD FRML MDRD: 51 ML/MIN/1.73M2
GLUCOSE BLD-MCNC: 176 MG/DL (ref 70–99)
HCT VFR BLD AUTO: 36 % (ref 40–53)
HGB BLD-MCNC: 11.8 G/DL (ref 13.3–17.7)
MCH RBC QN AUTO: 28.2 PG (ref 26.5–33)
MCHC RBC AUTO-ENTMCNC: 32.8 G/DL (ref 31.5–36.5)
MCV RBC AUTO: 86 FL (ref 78–100)
PHOSPHATE SERPL-MCNC: 2.9 MG/DL (ref 2.5–4.5)
PLATELET # BLD AUTO: 191 10E3/UL (ref 150–450)
POTASSIUM BLD-SCNC: 4.3 MMOL/L (ref 3.4–5.3)
PROT SERPL-MCNC: 7.3 G/DL (ref 6.8–8.8)
PTH-INTACT SERPL-MCNC: 93 PG/ML (ref 18–80)
RBC # BLD AUTO: 4.18 10E6/UL (ref 4.4–5.9)
RETICS # AUTO: 0.13 10E6/UL (ref 0.03–0.1)
RETICS/RBC NFR AUTO: 3 % (ref 0.5–2)
SODIUM SERPL-SCNC: 139 MMOL/L (ref 133–144)
TOTAL PROTEIN SERUM FOR ELP: 6.9 G/DL (ref 6.8–8.8)
WBC # BLD AUTO: 4.1 10E3/UL (ref 4–11)

## 2022-06-15 PROCEDURE — 80053 COMPREHEN METABOLIC PANEL: CPT

## 2022-06-15 PROCEDURE — 84165 PROTEIN E-PHORESIS SERUM: CPT

## 2022-06-15 PROCEDURE — 85045 AUTOMATED RETICULOCYTE COUNT: CPT

## 2022-06-15 PROCEDURE — 83521 IG LIGHT CHAINS FREE EACH: CPT

## 2022-06-15 PROCEDURE — 84590 ASSAY OF VITAMIN A: CPT | Mod: 90

## 2022-06-15 PROCEDURE — 99000 SPECIMEN HANDLING OFFICE-LAB: CPT

## 2022-06-15 PROCEDURE — 85027 COMPLETE CBC AUTOMATED: CPT

## 2022-06-15 PROCEDURE — 83970 ASSAY OF PARATHORMONE: CPT

## 2022-06-15 PROCEDURE — 84100 ASSAY OF PHOSPHORUS: CPT

## 2022-06-15 PROCEDURE — 36415 COLL VENOUS BLD VENIPUNCTURE: CPT

## 2022-06-15 PROCEDURE — 82542 COL CHROMOTOGRAPHY QUAL/QUAN: CPT | Mod: 90

## 2022-06-16 ENCOUNTER — LAB (OUTPATIENT)
Dept: LAB | Facility: CLINIC | Age: 82
End: 2022-06-16
Payer: COMMERCIAL

## 2022-06-16 DIAGNOSIS — Z53.9 ERRONEOUS ENCOUNTER--DISREGARD: Primary | ICD-10-CM

## 2022-06-16 LAB
ALBUMIN SERPL ELPH-MCNC: 4.6 G/DL (ref 3.7–5.1)
ALPHA1 GLOB SERPL ELPH-MCNC: 0.3 G/DL (ref 0.2–0.4)
ALPHA2 GLOB SERPL ELPH-MCNC: 0.6 G/DL (ref 0.5–0.9)
B-GLOBULIN SERPL ELPH-MCNC: 0.8 G/DL (ref 0.6–1)
GAMMA GLOB SERPL ELPH-MCNC: 0.6 G/DL (ref 0.7–1.6)
KAPPA LC FREE SER-MCNC: 1.92 MG/DL (ref 0.33–1.94)
KAPPA LC FREE/LAMBDA FREE SER NEPH: 1.3 {RATIO} (ref 0.26–1.65)
LAMBDA LC FREE SERPL-MCNC: 1.48 MG/DL (ref 0.57–2.63)
M PROTEIN SERPL ELPH-MCNC: 0 G/DL
PROT PATTERN SERPL ELPH-IMP: ABNORMAL

## 2022-06-16 PROCEDURE — 81050 URINALYSIS VOLUME MEASURE: CPT | Performed by: PATHOLOGY

## 2022-06-16 PROCEDURE — 84166 PROTEIN E-PHORESIS/URINE/CSF: CPT | Performed by: PATHOLOGY

## 2022-06-18 LAB
ANNOTATION COMMENT IMP: NORMAL
RETINYL PALMITATE SERPL-MCNC: 0.03 MG/L
VIT A SERPL-MCNC: 1.03 MG/L

## 2022-06-20 LAB
M PROTEIN MFR UR ELPH: 0 %
PROT PATTERN UR ELPH-IMP: NORMAL

## 2022-06-21 ENCOUNTER — OFFICE VISIT (OUTPATIENT)
Dept: INTERNAL MEDICINE | Facility: CLINIC | Age: 82
End: 2022-06-21
Payer: COMMERCIAL

## 2022-06-21 VITALS
OXYGEN SATURATION: 98 % | TEMPERATURE: 98.1 F | DIASTOLIC BLOOD PRESSURE: 68 MMHG | BODY MASS INDEX: 29.01 KG/M2 | HEART RATE: 58 BPM | SYSTOLIC BLOOD PRESSURE: 138 MMHG | WEIGHT: 208 LBS

## 2022-06-21 DIAGNOSIS — E78.5 HYPERLIPIDEMIA LDL GOAL <70: ICD-10-CM

## 2022-06-21 DIAGNOSIS — E21.3 HYPERPARATHYROIDISM (H): ICD-10-CM

## 2022-06-21 DIAGNOSIS — I50.30 HEART FAILURE WITH PRESERVED EJECTION FRACTION, NYHA CLASS II (H): ICD-10-CM

## 2022-06-21 DIAGNOSIS — Z79.4 TYPE 2 DIABETES MELLITUS WITHOUT COMPLICATION, WITH LONG-TERM CURRENT USE OF INSULIN (H): ICD-10-CM

## 2022-06-21 DIAGNOSIS — N18.31 STAGE 3A CHRONIC KIDNEY DISEASE (H): ICD-10-CM

## 2022-06-21 DIAGNOSIS — E03.9 HYPOTHYROIDISM, UNSPECIFIED TYPE: ICD-10-CM

## 2022-06-21 DIAGNOSIS — E11.9 TYPE 2 DIABETES MELLITUS WITHOUT COMPLICATION, WITH LONG-TERM CURRENT USE OF INSULIN (H): ICD-10-CM

## 2022-06-21 DIAGNOSIS — D64.9 ANEMIA, UNSPECIFIED TYPE: ICD-10-CM

## 2022-06-21 PROCEDURE — 99214 OFFICE O/P EST MOD 30 MIN: CPT | Performed by: INTERNAL MEDICINE

## 2022-06-21 RX ORDER — TORSEMIDE 10 MG/1
10 TABLET ORAL DAILY
Qty: 90 TABLET | Refills: 1 | Status: SHIPPED | OUTPATIENT
Start: 2022-06-21 | End: 2022-09-02

## 2022-06-21 NOTE — PATIENT INSTRUCTIONS
Continue Torsemide 1 tab daily  Continue other meds.  Rx for new Freestyle Libre2 CGM monitor  Call  [phone] or use [facility] to schedule a future lab appointment  fasting in mid August   For fasting labs, please refrain from eating for 8 hours or more.   Drink 2 glasses of water before your lab appointment. It is fine to take your  oral medications on the morning of the lab test as usual   I would recommend a covid booster vaccination. You may have it done at any pharmacy. If you wish to have it done at a [place] pharmacy, then go to [email or web address] to schedule a vaccination appointment  Follow-up with [facility] as scheduled re: stomach gastroparesis. Maintain smaller frequent meals and chew slowly

## 2022-06-21 NOTE — PROGRESS NOTES
ASSESSMENT:   1. Type 2 diabetes mellitus without complication, with long-term current use of insulin (H)  Controlled.  Continue current medications.  Repeat lab in August  - Comprehensive metabolic panel; Future  - Hemoglobin A1c; Future  - Albumin Random Urine Quantitative with Creat Ratio; Future  - Continuous Blood Gluc  (FREESTYLE DELFINA 2 READER) CHRYSTAL; 1 Device continuous  Dispense: 1 each; Refill: 1    2. Hyperparathyroidism (H)  Improved.  Calcium also improved.  Repeat labs in August  - Comprehensive metabolic panel; Future  - Parathyroid Hormone Intact; Future    3. Heart failure with preserved ejection fraction, NYHA class II (H)  Controlled.  Continue torsemide.  Denies current orthopnea or PND  - torsemide (DEMADEX) 10 MG tablet; Take 1 tablet (10 mg) by mouth daily Profile only. Pt doesn't require refill at this time  Dispense: 90 tablet; Refill: 1    4. Stage 3a chronic kidney disease (H)  Previously stable.  Future labs ordered  - Comprehensive metabolic panel; Future  - Albumin Random Urine Quantitative with Creat Ratio; Future    5. Hyperlipidemia LDL goal <70  Controlled with statin therapy.  Future labs ordered  - Comprehensive metabolic panel; Future  - Lipid panel reflex to direct LDL Fasting; Future    6. Hypothyroidism, unspecified type  Previously controlled.  Continue levothyroxine.  Future labs ordered  - TSH with free T4 reflex; Future    7. Anemia, unspecified type  Appears related to CKD.  Previously stable.  SPEP and iron levels normal.  Future labs ordered for monitoring.  Denies seeing blood in his stools  - CBC with platelets; Future      PLAN:  Continue Torsemide 1 tab daily  Continue other meds.  Rx for new Freestyle Libre2 CGM monitor  Call  [phone] or use [employer] to schedule a future lab appointment  fasting in mid August   For fasting labs, please refrain from eating for 8 hours or more.   Drink 2 glasses of water before your lab appointment. It is fine to take  your  oral medications on the morning of the lab test as usual   I would recommend a covid booster vaccination. You may have it done at any pharmacy. If you wish to have it done at a [facility] pharmacy, then go to [email or web address] to schedule a vaccination appointment  Follow-up with [facility] as scheduled re: stomach gastroparesis. Maintain smaller frequent meals and chew slowly        (Chart documentation was completed, in part, with [employer] voice-recognition software. Even though reviewed, some grammatical, spelling, and word errors may remain.)    Andrew Elizalde MD  Internal Medicine Department  [facility]      Max Naylor is a 81 year old, presenting for the following health issues:  Lab Result Notice      History of Present Illness       Diabetes:   He presents for follow up of diabetes.  He is checking home blood glucose four or more times daily. He checks blood glucose before meals and at bedtime.  Blood glucose is sometimes over 200 and sometimes under 70. He is aware of hypoglycemia symptoms including dizziness and weakness. He has no concerns regarding his diabetes at this time.  He is having numbness in feet and excessive thirst.         He eats 0-1 servings of fruits and vegetables daily.He consumes 0 sweetened beverage(s) daily.He exercises with enough effort to increase his heart rate 10 to 19 minutes per day.  He exercises with enough effort to increase his heart rate 6 days per week.   He is taking medications regularly.      Most recent lab results reviewed with pt.      Component      Latest Ref Rng & Units 3/29/2022 5/4/2022   Sodium      133 - 144 mmol/L 140    Potassium      3.4 - 5.3 mmol/L 4.3    Chloride      94 - 109 mmol/L 108    Carbon Dioxide      20 - 32 mmol/L 27    Anion Gap      3 - 14 mmol/L 5    Urea Nitrogen      7 - 30 mg/dL 25    Creatinine      0.66 - 1.25 mg/dL 1.50 (H)    Calcium      8.5 - 10.1 mg/dL 11.0 (H)    Glucose      70 - 99  mg/dL 167 (H)    Alkaline Phosphatase      40 - 150 U/L     AST      0 - 45 U/L     ALT      0 - 70 U/L     Protein Total      6.8 - 8.8 g/dL     Albumin      3.4 - 5.0 g/dL     Bilirubin Total      0.2 - 1.3 mg/dL     GFR Estimate      >60 mL/min/1.73m2 47 (L)    WBC      4.0 - 11.0 10e3/uL     RBC Count      4.40 - 5.90 10e6/uL     Hemoglobin      13.3 - 17.7 g/dL     Hematocrit      40.0 - 53.0 %     MCV      78 - 100 fL     MCH      26.5 - 33.0 pg     MCHC      31.5 - 36.5 g/dL     RDW      10.0 - 15.0 %     Platelet Count      150 - 450 10e3/uL     Albumin Fraction      3.7 - 5.1 g/dL     Alpha 1 Fraction      0.2 - 0.4 g/dL     Alpha 2 Fraction      0.5 - 0.9 g/dL     Beta Fraction      0.6 - 1.0 g/dL     Gamma Fraction      0.7 - 1.6 g/dL     Monoclonal Peak      <=0.0 g/dL     ELP Interpretation:           Kappa Free Lt Chain      0.33 - 1.94 mg/dL     Lambda Free Lt Chain      0.57 - 2.63 mg/dL     Kappa Lambda Ratio      0.26 - 1.65     Vitamin A      0.30 - 1.20 mg/L     Retinol Palmitate      0.00 - 0.10 mg/L     Vitamin A Interp           % Retic      0.5 - 2.0 %     Absolute Retic      0.025 - 0.095 10e6/uL     Hemoglobin A1C      0.0 - 5.6 %  6.8 (H)   Parathyroid Hormone Intact      18 - 80 pg/mL     Phosphorus      2.5 - 4.5 mg/dL     PTH Related Peptide Test      0.0 - 2.3 pmol/L     Total Protein Serum for ELP      6.8 - 8.8 g/dL       Component      Latest Ref Rng & Units 6/15/2022   Sodium      133 - 144 mmol/L 139   Potassium      3.4 - 5.3 mmol/L 4.3   Chloride      94 - 109 mmol/L 107   Carbon Dioxide      20 - 32 mmol/L 32   Anion Gap      3 - 14 mmol/L <1 (L)   Urea Nitrogen      7 - 30 mg/dL 25   Creatinine      0.66 - 1.25 mg/dL 1.40 (H)   Calcium      8.5 - 10.1 mg/dL 10.3 (H)   Glucose      70 - 99 mg/dL 176 (H)   Alkaline Phosphatase      40 - 150 U/L 93   AST      0 - 45 U/L 27   ALT      0 - 70 U/L 35   Protein Total      6.8 - 8.8 g/dL 7.3   Albumin      3.4 - 5.0 g/dL 4.2    Bilirubin Total      0.2 - 1.3 mg/dL 0.8   GFR Estimate      >60 mL/min/1.73m2 51 (L)   WBC      4.0 - 11.0 10e3/uL 4.1   RBC Count      4.40 - 5.90 10e6/uL 4.18 (L)   Hemoglobin      13.3 - 17.7 g/dL 11.8 (L)   Hematocrit      40.0 - 53.0 % 36.0 (L)   MCV      78 - 100 fL 86   MCH      26.5 - 33.0 pg 28.2   MCHC      31.5 - 36.5 g/dL 32.8   RDW      10.0 - 15.0 % 13.8   Platelet Count      150 - 450 10e3/uL 191   Albumin Fraction      3.7 - 5.1 g/dL 4.6   Alpha 1 Fraction      0.2 - 0.4 g/dL 0.3   Alpha 2 Fraction      0.5 - 0.9 g/dL 0.6   Beta Fraction      0.6 - 1.0 g/dL 0.8   Gamma Fraction      0.7 - 1.6 g/dL 0.6 (L)   Monoclonal Peak      <=0.0 g/dL 0.0   ELP Interpretation:       Essentially normal electrophoretic pattern. No obvious monoclonal proteins seen. Pathologic significance requires clinical correlation. BRANDON Gregory M.D., Ph.D., Pathologist.   Solis Free Lt Chain      0.33 - 1.94 mg/dL 1.92   Lambda Free Lt Chain      0.57 - 2.63 mg/dL 1.48   Kappa Lambda Ratio      0.26 - 1.65 1.30   Vitamin A      0.30 - 1.20 mg/L 1.03   Retinol Palmitate      0.00 - 0.10 mg/L 0.03   Vitamin A Interp       Normal   % Retic      0.5 - 2.0 % 3.0 (H)   Absolute Retic      0.025 - 0.095 10e6/uL 0.127 (H)   Hemoglobin A1C      0.0 - 5.6 %    Parathyroid Hormone Intact      18 - 80 pg/mL 93 (H)   Phosphorus      2.5 - 4.5 mg/dL 2.9   PTH Related Peptide Test      0.0 - 2.3 pmol/L <2.0   Total Protein Serum for ELP      6.8 - 8.8 g/dL 6.9     Blood sugars:  107, 128, 139, 221  111, 139, 161, 119  97, 148, 142, 168  97 90, 132, 131  87  Patient's weight at home has been ranging 199-200 pounds.  Systolic blood pressures are generally in the 140s.  Has been using a freestyle CGM but sensors are not working quite as well and needs refill  Denies CP, SOB,  polyuria, polydipsia, vision changes,  or skin problems. Minimal numbness in toes bilaterally  History gastroparesis.  Occasional nausea.  Very rare vomiting now.   "Tolerating oral intake well last couple days. BMs OK         Additional ROS:   Constitutional, HEENT, Cardiovascular, Pulmonary, GI and , Neuro, MSK and Psych review of systems/symptoms are otherwise negative or unchanged from previous, except as noted above.      OBJECTIVE:  /68   Pulse 58   Temp 98.1  F (36.7  C) (Oral)   Wt 94.3 kg (208 lb)   SpO2 98%   BMI 29.01 kg/m     Estimated body mass index is 29.01 kg/m  as calculated from the following:    Height as of 5/5/22: 1.803 m (5' 11\").    Weight as of this encounter: 94.3 kg (208 lb).     Neck: no adenopathy. Thyroid normal to palpation. No bruits  Pulm: Lungs clear to auscultation   CV: Regular rates and rhythm  GI: Soft, nontender, Normal active bowel sounds, No hepatosplenomegaly or masses palpable  Ext: Peripheral pulses intact. Mild BLE edema.  Neuro: Normal strength and tone, sensory exam grossly normal sensation despite subjective symptoms                 "

## 2022-06-27 DIAGNOSIS — E11.9 TYPE 2 DIABETES MELLITUS WITHOUT COMPLICATION, WITH LONG-TERM CURRENT USE OF INSULIN (H): ICD-10-CM

## 2022-06-27 DIAGNOSIS — Z79.4 TYPE 2 DIABETES MELLITUS WITHOUT COMPLICATION, WITH LONG-TERM CURRENT USE OF INSULIN (H): ICD-10-CM

## 2022-06-27 LAB — PTH RELATED PROT SERPL-SCNC: <2 PMOL/L

## 2022-06-28 ENCOUNTER — IMMUNIZATION (OUTPATIENT)
Dept: NURSING | Facility: CLINIC | Age: 82
End: 2022-06-28
Payer: COMMERCIAL

## 2022-06-28 PROCEDURE — 91305 COVID-19,PF,PFIZER (12+ YRS): CPT

## 2022-06-28 PROCEDURE — 0054A COVID-19,PF,PFIZER (12+ YRS): CPT

## 2022-06-29 NOTE — TELEPHONE ENCOUNTER
Routing refill request to provider for review/approval because:  Alternative requested d/t formulary

## 2022-06-30 RX ORDER — BLOOD SUGAR DIAGNOSTIC
STRIP MISCELLANEOUS
Qty: 100 STRIP | Refills: 11 | Status: SHIPPED | OUTPATIENT
Start: 2022-06-30 | End: 2024-09-04

## 2022-06-30 NOTE — TELEPHONE ENCOUNTER
Called patient he states he does use the free style harry but gets a notification from the harry sometimes to check his BS with a test strip     Patient states he is only needing about 100 to last him 6 month or more     Eric Price RN

## 2022-06-30 NOTE — TELEPHONE ENCOUNTER
Per chart, patient now using freestyle harry 2 continuous glucose monitor.  Check to see if patient requesting standard glucometer test strips or if this is an auto refill request from pharmacy.  If requesting glucometer strips, then confirmed to have a glucometer patient requesting since note from [facility] states formulary issue with this brand of test strips

## 2022-07-07 ENCOUNTER — THERAPY VISIT (OUTPATIENT)
Dept: SLEEP MEDICINE | Facility: CLINIC | Age: 82
End: 2022-07-07
Payer: COMMERCIAL

## 2022-07-07 DIAGNOSIS — I27.20 PULMONARY HTN (H): ICD-10-CM

## 2022-07-07 DIAGNOSIS — I48.91 ATRIAL FIBRILLATION, UNSPECIFIED TYPE (H): ICD-10-CM

## 2022-07-07 DIAGNOSIS — I10 ESSENTIAL HYPERTENSION, BENIGN: ICD-10-CM

## 2022-07-07 DIAGNOSIS — I50.9 HEART FAILURE, UNSPECIFIED HF CHRONICITY, UNSPECIFIED HEART FAILURE TYPE (H): ICD-10-CM

## 2022-07-07 DIAGNOSIS — G47.33 OSA (OBSTRUCTIVE SLEEP APNEA): ICD-10-CM

## 2022-07-07 PROCEDURE — 95810 POLYSOM 6/> YRS 4/> PARAM: CPT | Performed by: INTERNAL MEDICINE

## 2022-07-12 ENCOUNTER — TELEPHONE (OUTPATIENT)
Dept: CARDIOLOGY | Facility: CLINIC | Age: 82
End: 2022-07-12

## 2022-07-12 DIAGNOSIS — I48.91 ATRIAL FIBRILLATION, UNSPECIFIED TYPE (H): ICD-10-CM

## 2022-07-12 DIAGNOSIS — I50.30 HEART FAILURE WITH PRESERVED EJECTION FRACTION, NYHA CLASS II (H): Primary | ICD-10-CM

## 2022-07-12 NOTE — TELEPHONE ENCOUNTER
"[facility] Call Center    Phone Message    May a detailed message be left on voicemail: yes     Reason for Call: Symptoms or Concerns     If patient has red-flag symptoms, warm transfer to triage line    Current symptom or concern: Per pt via [email or web address] message -  did assist with rescheduling appt from Dec 2022 to Sept 2022.    \"Since Christmas 2021 I have been in Afib more times then not. I need to know if that is OK or what.  I have not seen  for about a year, I am at a lost what to do next. I have a follew up Sleep appointment July 21.\"    Are there any new or worsening symptoms? Unknown-the patient submitted the appointment request on [email or web address] unaware of any additional details other than what the patient provided . Please contact the patient via phone to assess their symptoms.        Action Taken: Message routed to:  Other: Cardiology    Travel Screening: Not Applicable                                                                        "

## 2022-07-13 NOTE — TELEPHONE ENCOUNTER
Review of chart notes, Dayday saw Dr. Adams on 2\14\22.  At that time Dr. Adams recommended repeat Echo and return visit in 6 months, after Dayday has his sleep study.      Received information from schedulers that Dayday would like to see Dr. Adams sooner than December, (new apmnt sched for 9\2\22) and that Dayday has sleep study results apmnt on 8\3.      Writer placed call to Dayday.  He states that he is concerned about the increased frequency of atrial fib.  States in the past he saw Dr. Jarvis, who had ordered cardioversion and that really helped.  Writer explained that right now Dayday has too many other issues going on to immediately go for the cardioversion, but that I will certainly discuss with Dr. Adams what all the options are.  Informed Dayday that we are happy to hear he has had the sleep study and that he meets with the specialist to discuss options for CPAP.  Instructed that we need to get the Echo study done in August, so Dr. Adams will have those results as well.      Writer will work with our  team to find a spot after 8\3 and before 9\2, Dayday prefers [facility], but he is flexible on time and date.    Writer has routed message to Dr. Adams for collaboration.

## 2022-07-13 NOTE — TELEPHONE ENCOUNTER
When I saw Dayday last time he was in atrial fibrillation with controlled ventricular rate and review of previous EKGs indicated that he has been in n probably persistent A. Fib.  I agree with an earlier clinic appointment either with me or with an KOJO whoever is available.  However I also recommend doing a 48-hour Holter evaluation to see his overall ventricular rates and to identify the underlying predominant rhythm.  The Holter can be done prior to his clinic appointment as this will help in decision-making during the clinic visit.    Thank you  [place]

## 2022-07-13 NOTE — TELEPHONE ENCOUNTER
As requested by Dr. Adams, order placed for 48 hr Holter monitor and request sent to  team to get this appointment set up with Dayday.    Writer also placed call to Dayday, informing him of Dr. Adams response to his inquiry this morning.  Dayday is in agreement.    Krystyna Prabhakar RN on 7/13/2022 at 11:46 AM

## 2022-07-14 ASSESSMENT — SLEEP AND FATIGUE QUESTIONNAIRES
HOW LIKELY ARE YOU TO NOD OFF OR FALL ASLEEP WHILE LYING DOWN TO REST IN THE AFTERNOON WHEN CIRCUMSTANCES PERMIT: HIGH CHANCE OF DOZING
HOW LIKELY ARE YOU TO NOD OFF OR FALL ASLEEP WHILE WATCHING TV: HIGH CHANCE OF DOZING
HOW LIKELY ARE YOU TO NOD OFF OR FALL ASLEEP WHILE SITTING INACTIVE IN A PUBLIC PLACE: SLIGHT CHANCE OF DOZING
HOW LIKELY ARE YOU TO NOD OFF OR FALL ASLEEP WHILE SITTING AND TALKING TO SOMEONE: WOULD NEVER DOZE
HOW LIKELY ARE YOU TO NOD OFF OR FALL ASLEEP WHILE SITTING AND READING: MODERATE CHANCE OF DOZING
HOW LIKELY ARE YOU TO NOD OFF OR FALL ASLEEP WHILE SITTING QUIETLY AFTER LUNCH WITHOUT ALCOHOL: WOULD NEVER DOZE
HOW LIKELY ARE YOU TO NOD OFF OR FALL ASLEEP WHEN YOU ARE A PASSENGER IN A CAR FOR AN HOUR WITHOUT A BREAK: SLIGHT CHANCE OF DOZING
HOW LIKELY ARE YOU TO NOD OFF OR FALL ASLEEP IN A CAR, WHILE STOPPED FOR A FEW MINUTES IN TRAFFIC: WOULD NEVER DOZE

## 2022-07-18 LAB — SLPCOMP: NORMAL

## 2022-07-20 NOTE — PROGRESS NOTES
[facility]   Outpatient Sleep Medicine  Jul 21, 2022       Name: Pascual Perea MRN# [id number]   Age: 82 year old YOB: 1940            Assessment and Plan:   1. BRANDON (obstructive sleep apnea)  2. Atrial fibrillation, unspecified type (H)  3. Heart failure, unspecified HF chronicity, unspecified heart failure type (H)  4. Pulmonary HTN (H)  5. Essential hypertension, benign    During this visit, we reviewed the summary of the study including stage, position, event distribution, oximetry and heart rate.  Sleep architecture showed all sleep stages present with normal sleep efficiency 96.1%, but fragmentation due to respiratory events and leg movements.  Moderate obstructive sleep apnea was seen, exacerbated in supine sleep, with associated oxygen desaturations and mild hypoxemia-AHI 24.5, RDI 25.4, Supine AHI 57.0, REM AHI 15.5, Low O2 84.0%, Time Spent ?88% 7.0 minutes / Time Spent ?89% 18.7 minutes.  There was also an increased frequency of periodic limb movements of sleep with associated arousals-PLM index 68.2, arousal index 28.8.  One-lead EKG showed atrial fibrillation and T wave inversion (pre-existing).    As discussed at last visit, patient would like to restart CPAP therapy given cardiovascular benefits. Orders placed today to start autoCPAP [id number]. Will be enrolled in [facility]. Reviewed importance of nightly therapy for effective treatment of BRANDON, and he voiced understanding and agreement.  We also reviewed importance of using PAP during the entire sleep duration to achieve maximal benefits, but reviewed that a minimum of 4 hours per night was required.  He is confident that he can achieve these goals and is willing to start therapy as soon as possible.  - Comprehensive DME    At last visit denied RLS/knowingly kicking legs in sleep. Denies again today. Agreed not to pursue any treatment at this time but consider pending progress/if disrupted sleep continues after adequate  "treatment of BRANDON.     He will be seen back approximately 2 months after starting PAP therapy to ensure compliance and review treatment outcomes.  However, if he develops any difficulties with treatment he is instructed to contact us or DME company immediately to troubleshoot problems.         Chief Complaint      Chief Complaint   Patient presents with     Study Results          History of Present Illness:   Pascual Perea is a 82 year old male who presents to the clinic for results of recent sleep study completed on 7/7/2022. Study was completed to evaluate for current sleep apnea severity.     Recall, previously diagnosed with moderate BRANDON via PSG on 8/30/2010 with an AHI 19, desaturation roscoe 84%.  CPAP and MAD used following the study but did not use very long because did not tolerate either.    Reviewed results of sleep study with patient and summarized as follows:    Sleep latency 4.9 minutes without use of sleep aid. REM achieved. REM latency 84.5 minutes.  Sleep efficiency normal at 96.1%%. Total sleep time 439.0 minutes.    Sleep architecture:  Stage 1, 4.3% (5%), stage 2, 63.8% (45-55%), stage 3, 13.3% (15-20%), stage REM, 18.6% (20-25%).      AHI was 24.5 (central AHI 0.3). RDI 25.4.  REM AHI 15.5, consistent with moderate REM BRANDON.  Supine AHI 57.0, consistent with severe SUPINE BRANDON.  Non-supine AHI 17.0, consistent with moderate non-supine BRANDON.    Baseline oxygen saturation was 93.1%. Lowest oxygen saturation was 84.0%. Time spent <88% was 7.0  minutes. Time spent <89% was 18.7 minutes.     Periodic Limb Movement Index 68.2/hour, arousal index 28.2/hour.     Cardiac summary: Atrial fibrillation and T wave inversion (A. fib, ST and T wave abnormalities are pre-existing)    Past medical/surgical history, family history, social history, medications and allergies were reviewed.           Physical Examination:   BP (!) 159/76   Pulse 65   Ht 1.803 m (5' 11\")   Wt 94.8 kg (209 lb)   SpO2 96%   BMI " 29.15 kg/m    General appearance: Awake, alert, cooperative. Well groomed. Sitting comfortably in chair. In no apparent distress.  HEENT: Head: Normocephalic, atraumatic. Eyes:Conjunctiva clear. Sclera normal. Remainder of face covered by mask.   Pulmonary:  Able to speak easily in full sentences. No cough or wheeze.   Skin:  No rashes or significant lesions on visible skin.   Neurologic: Alert, oriented x3.   Psychiatric: Mood euthymic. Affect congruent with full range and intensity.      CC:  Andrew Elizalde PA-C  Jul 21, 2022     [facility]  [address] [place] [address]     [facility]  [address] [address]    Chart documentation was completed, in part, with [email or web address] voice-recognition software. Even though reviewed, some grammatical, spelling, and word errors may remain.    23 minutes spent on day of encounter doing chart review, history and exam, documentation, and further activities as noted above

## 2022-07-21 ENCOUNTER — OFFICE VISIT (OUTPATIENT)
Dept: SLEEP MEDICINE | Facility: CLINIC | Age: 82
End: 2022-07-21
Payer: COMMERCIAL

## 2022-07-21 VITALS
HEIGHT: 71 IN | DIASTOLIC BLOOD PRESSURE: 76 MMHG | BODY MASS INDEX: 29.26 KG/M2 | OXYGEN SATURATION: 96 % | HEART RATE: 65 BPM | WEIGHT: 209 LBS | SYSTOLIC BLOOD PRESSURE: 159 MMHG

## 2022-07-21 DIAGNOSIS — I50.9 HEART FAILURE, UNSPECIFIED HF CHRONICITY, UNSPECIFIED HEART FAILURE TYPE (H): ICD-10-CM

## 2022-07-21 DIAGNOSIS — I27.20 PULMONARY HTN (H): ICD-10-CM

## 2022-07-21 DIAGNOSIS — I48.91 ATRIAL FIBRILLATION, UNSPECIFIED TYPE (H): ICD-10-CM

## 2022-07-21 DIAGNOSIS — I10 ESSENTIAL HYPERTENSION, BENIGN: ICD-10-CM

## 2022-07-21 DIAGNOSIS — G47.33 OSA (OBSTRUCTIVE SLEEP APNEA): Primary | ICD-10-CM

## 2022-07-21 PROCEDURE — 99213 OFFICE O/P EST LOW 20 MIN: CPT | Performed by: PHYSICIAN ASSISTANT

## 2022-07-21 NOTE — NURSING NOTE
"Chief Complaint   Patient presents with     Study Results       Initial BP (!) 159/76   Pulse 65   Ht 1.803 m (5' 11\")   Wt 94.8 kg (209 lb)   SpO2 96%   BMI 29.15 kg/m   Estimated body mass index is 29.15 kg/m  as calculated from the following:    Height as of this encounter: 1.803 m (5' 11\").    Weight as of this encounter: 94.8 kg (209 lb).    Medication Reconciliation: complete  ESS:10    Lisa Lorenzo CNA    "

## 2022-07-25 ENCOUNTER — TELEPHONE (OUTPATIENT)
Dept: SLEEP MEDICINE | Facility: CLINIC | Age: 82
End: 2022-07-25

## 2022-08-03 ENCOUNTER — TRANSFERRED RECORDS (OUTPATIENT)
Dept: HEALTH INFORMATION MANAGEMENT | Facility: CLINIC | Age: 82
End: 2022-08-03

## 2022-08-03 ENCOUNTER — LAB (OUTPATIENT)
Dept: LAB | Facility: CLINIC | Age: 82
End: 2022-08-03
Payer: COMMERCIAL

## 2022-08-03 DIAGNOSIS — E03.9 HYPOTHYROIDISM, UNSPECIFIED TYPE: ICD-10-CM

## 2022-08-03 DIAGNOSIS — D64.9 ANEMIA, UNSPECIFIED TYPE: ICD-10-CM

## 2022-08-03 DIAGNOSIS — E21.3 HYPERPARATHYROIDISM (H): ICD-10-CM

## 2022-08-03 DIAGNOSIS — E78.5 HYPERLIPIDEMIA LDL GOAL <70: ICD-10-CM

## 2022-08-03 DIAGNOSIS — E11.9 TYPE 2 DIABETES MELLITUS WITHOUT COMPLICATION, WITH LONG-TERM CURRENT USE OF INSULIN (H): ICD-10-CM

## 2022-08-03 DIAGNOSIS — N18.31 STAGE 3A CHRONIC KIDNEY DISEASE (H): ICD-10-CM

## 2022-08-03 DIAGNOSIS — Z79.4 TYPE 2 DIABETES MELLITUS WITHOUT COMPLICATION, WITH LONG-TERM CURRENT USE OF INSULIN (H): ICD-10-CM

## 2022-08-03 LAB
ALBUMIN SERPL-MCNC: 4.2 G/DL (ref 3.4–5)
ALP SERPL-CCNC: 74 U/L (ref 40–150)
ALT SERPL W P-5'-P-CCNC: 34 U/L (ref 0–70)
ANION GAP SERPL CALCULATED.3IONS-SCNC: 1 MMOL/L (ref 3–14)
AST SERPL W P-5'-P-CCNC: 23 U/L (ref 0–45)
BILIRUB SERPL-MCNC: 0.8 MG/DL (ref 0.2–1.3)
BUN SERPL-MCNC: 23 MG/DL (ref 7–30)
CALCIUM SERPL-MCNC: 10.3 MG/DL (ref 8.5–10.1)
CHLORIDE BLD-SCNC: 111 MMOL/L (ref 94–109)
CHOLEST SERPL-MCNC: 126 MG/DL
CO2 SERPL-SCNC: 28 MMOL/L (ref 20–32)
CREAT SERPL-MCNC: 1.53 MG/DL (ref 0.66–1.25)
CREAT UR-MCNC: 83 MG/DL
ERYTHROCYTE [DISTWIDTH] IN BLOOD BY AUTOMATED COUNT: 13.6 % (ref 10–15)
FASTING STATUS PATIENT QL REPORTED: YES
GFR SERPL CREATININE-BSD FRML MDRD: 45 ML/MIN/1.73M2
GLUCOSE BLD-MCNC: 138 MG/DL (ref 70–99)
HBA1C MFR BLD: 7 % (ref 0–5.6)
HCT VFR BLD AUTO: 36.5 % (ref 40–53)
HDLC SERPL-MCNC: 38 MG/DL
HGB BLD-MCNC: 11.9 G/DL (ref 13.3–17.7)
LDLC SERPL CALC-MCNC: 34 MG/DL
MCH RBC QN AUTO: 28 PG (ref 26.5–33)
MCHC RBC AUTO-ENTMCNC: 32.6 G/DL (ref 31.5–36.5)
MCV RBC AUTO: 86 FL (ref 78–100)
MICROALBUMIN UR-MCNC: <5 MG/L
MICROALBUMIN/CREAT UR: NORMAL MG/G{CREAT}
NONHDLC SERPL-MCNC: 88 MG/DL
PLATELET # BLD AUTO: 172 10E3/UL (ref 150–450)
POTASSIUM BLD-SCNC: 4.3 MMOL/L (ref 3.4–5.3)
PROT SERPL-MCNC: 7.1 G/DL (ref 6.8–8.8)
PTH-INTACT SERPL-MCNC: 57 PG/ML (ref 15–65)
RBC # BLD AUTO: 4.25 10E6/UL (ref 4.4–5.9)
SODIUM SERPL-SCNC: 140 MMOL/L (ref 133–144)
T4 FREE SERPL-MCNC: 1.03 NG/DL (ref 0.76–1.46)
TRIGL SERPL-MCNC: 269 MG/DL
TSH SERPL DL<=0.005 MIU/L-ACNC: 4.58 MU/L (ref 0.4–4)
WBC # BLD AUTO: 4.4 10E3/UL (ref 4–11)

## 2022-08-03 PROCEDURE — 84439 ASSAY OF FREE THYROXINE: CPT

## 2022-08-03 PROCEDURE — 85027 COMPLETE CBC AUTOMATED: CPT

## 2022-08-03 PROCEDURE — 83036 HEMOGLOBIN GLYCOSYLATED A1C: CPT

## 2022-08-03 PROCEDURE — 36415 COLL VENOUS BLD VENIPUNCTURE: CPT

## 2022-08-03 PROCEDURE — 82043 UR ALBUMIN QUANTITATIVE: CPT

## 2022-08-03 PROCEDURE — 80053 COMPREHEN METABOLIC PANEL: CPT

## 2022-08-03 PROCEDURE — 84443 ASSAY THYROID STIM HORMONE: CPT

## 2022-08-03 PROCEDURE — 80061 LIPID PANEL: CPT

## 2022-08-03 PROCEDURE — 83970 ASSAY OF PARATHORMONE: CPT

## 2022-08-09 ENCOUNTER — DOCUMENTATION ONLY (OUTPATIENT)
Dept: SLEEP MEDICINE | Facility: CLINIC | Age: 82
End: 2022-08-09

## 2022-08-09 DIAGNOSIS — G47.33 OSA (OBSTRUCTIVE SLEEP APNEA): Primary | ICD-10-CM

## 2022-08-09 NOTE — PROGRESS NOTES
Patient was offered choice of vendor and chose [facility].  Patient Pascual Perea was set up at [facility]  on August 9, 2022. Patient received a Resmed Airsense 11 Pressures were set at 5-15 cm H2O.   Patient s ramp is 5 cm H2O for Auto and FLEX/EPR is EPR, 2.  Patient received a Respironics Mask name: Dreamwear Pillow mask size Standard, heated tubing and heated humidifier.  Patient does need to meet compliance. Patient has a follow up on 9/21/2022 with SARAH Valladares

## 2022-08-11 ENCOUNTER — HOSPITAL ENCOUNTER (OUTPATIENT)
Dept: CARDIOLOGY | Facility: CLINIC | Age: 82
Discharge: HOME | End: 2022-08-11
Attending: INTERNAL MEDICINE
Payer: COMMERCIAL

## 2022-08-11 ENCOUNTER — TELEPHONE (OUTPATIENT)
Dept: CARDIOLOGY | Facility: CLINIC | Age: 82
End: 2022-08-11

## 2022-08-11 DIAGNOSIS — R06.00 DYSPNEA: ICD-10-CM

## 2022-08-11 DIAGNOSIS — I48.91 ATRIAL FIBRILLATION, UNSPECIFIED TYPE (H): ICD-10-CM

## 2022-08-11 DIAGNOSIS — I50.30 HEART FAILURE WITH PRESERVED EJECTION FRACTION, NYHA CLASS II (H): ICD-10-CM

## 2022-08-11 LAB — LVEF ECHO: NORMAL

## 2022-08-11 PROCEDURE — 93306 TTE W/DOPPLER COMPLETE: CPT | Mod: 26 | Performed by: INTERNAL MEDICINE

## 2022-08-11 PROCEDURE — 93225 XTRNL ECG REC<48 HRS REC: CPT

## 2022-08-11 PROCEDURE — 255N000002 HC RX 255 OP 636: Performed by: INTERNAL MEDICINE

## 2022-08-11 PROCEDURE — 999N000208 ECHOCARDIOGRAM COMPLETE

## 2022-08-11 PROCEDURE — 93227 XTRNL ECG REC<48 HR R&I: CPT | Performed by: INTERNAL MEDICINE

## 2022-08-11 RX ADMIN — HUMAN ALBUMIN MICROSPHERES AND PERFLUTREN 9 ML: 10; .22 INJECTION, SOLUTION INTRAVENOUS at 12:03

## 2022-08-11 NOTE — TELEPHONE ENCOUNTER
Received preliminary results from today's Echocardiogram.  Routed to Dr. Adams for further directon.  Writer left a basic generic VM for Dayday, requested him to call us for this preliminary report information.      Comparison with the Echocardiogram from December 2021, wherein the Pulm HTN was listed as Severe, and R ventricular systolic pressure was 70mmHg, it is now listed as Moderate with the pressure reaing approx 48mmHg.       Dayday has a follow up scheduled with Dr. Adams for 9\2\22.

## 2022-08-11 NOTE — TELEPHONE ENCOUNTER
Will discuss with patient in detail echo findings during upcomming clinic visit.    Thank  P[place]enix

## 2022-08-12 ENCOUNTER — DOCUMENTATION ONLY (OUTPATIENT)
Dept: SLEEP MEDICINE | Facility: CLINIC | Age: 82
End: 2022-08-12

## 2022-08-12 NOTE — PROGRESS NOTES
3 day Sleep therapy management telephone visit    Diagnostic AHI: 24.5  PSG    Confirmed with patient at time of call- Yes Patient is still interested in STM service       Message left for patient to return call        Objective data     Order Settings for PAP  CPAP min 5    CPAP max 15    CPAP fixed         Device settings from machine CPAP min 5.0     CPAP max 15.0      CPAP fixed      EPR Setting TWO    RESMED soft response  OFF     Assessment: Nightly usage over four hours      Action plan: Patient to have 14 day STM visit. Patient has a follow up visit scheduled:   yes within 31-90 days of set up    Replacement device: No  STM ordered by provider: Yes     Total time spent on accessing and  interpreting remote patient PAP therapy data  10 minutes    Total time spent counseling, coaching  and reviewing PAP therapy data with patient  1 minutes    [id number] no

## 2022-08-24 ENCOUNTER — TELEPHONE (OUTPATIENT)
Dept: CARDIOLOGY | Facility: CLINIC | Age: 82
End: 2022-08-24

## 2022-08-24 ENCOUNTER — DOCUMENTATION ONLY (OUTPATIENT)
Dept: SLEEP MEDICINE | Facility: CLINIC | Age: 82
End: 2022-08-24

## 2022-08-24 NOTE — TELEPHONE ENCOUNTER
Received preliminary results of the 48 hour heart rhythm monitor, which was placed to aide in determination of the underlying rhythm and what the ventricular rate is in response.        Writer has sent basic information to patient via [employer], and I have routed to Dr. Adams for his recommendations.   Upcoming apmnt on 9\2\22.    Krystyna Prabhakar RN on 8/24/2022 at 8:55 AM

## 2022-08-24 NOTE — PROGRESS NOTES
14  DAY STM VISIT    Diagnostic AHI: 24.5  PSG    Subjective measures:   Patient reports things are going well with CPAP and he is feeling well rested. He likes this machine better than his old machine    Assessment: Pt meeting objective benchmarks.  Patient meeting subjective benchmarks.     Action plan: pt to have 30 day STM visit.      Device type: Auto-CPAP    PAP settings:  CPAP MIN CPAP MAX 95TH % PRESSURE EPR RESMED SOFT RESPONSE SETTING   5.0 cm  H20 15.0 cm  H20 12.7 cm  H20  TWO OFF     Mask type:  Nasal Pillows    Objective measures: 14 day rolling measures   COMPLIANCE LEAK AHI AVERAGE USE IN MINUTES   100 % 23.28 3.48 447   GOAL >70% GOAL < 24 LPM GOAL <5 GOAL >240          Total time spent on accessing and interpreting remote patient PAP therapy data  10 minutes    Total time spent counseling, coaching  and reviewing PAP therapy data with patient  2 minutes    [id number]  [id number]  no (3 day STM)

## 2022-09-02 ENCOUNTER — OFFICE VISIT (OUTPATIENT)
Dept: CARDIOLOGY | Facility: CLINIC | Age: 82
End: 2022-09-02
Payer: COMMERCIAL

## 2022-09-02 VITALS
WEIGHT: 210 LBS | DIASTOLIC BLOOD PRESSURE: 75 MMHG | HEART RATE: 56 BPM | BODY MASS INDEX: 29.4 KG/M2 | HEIGHT: 71 IN | SYSTOLIC BLOOD PRESSURE: 136 MMHG

## 2022-09-02 DIAGNOSIS — I48.91 ATRIAL FIBRILLATION, UNSPECIFIED TYPE (H): ICD-10-CM

## 2022-09-02 DIAGNOSIS — I50.30 HEART FAILURE WITH PRESERVED EJECTION FRACTION, NYHA CLASS II (H): ICD-10-CM

## 2022-09-02 PROCEDURE — 99215 OFFICE O/P EST HI 40 MIN: CPT | Performed by: INTERNAL MEDICINE

## 2022-09-02 RX ORDER — TORSEMIDE 10 MG/1
10 TABLET ORAL DAILY
Qty: 90 TABLET | Refills: 1 | Status: SHIPPED | OUTPATIENT
Start: 2022-09-02 | End: 2022-10-19 | Stop reason: DRUGHIGH

## 2022-09-02 NOTE — LETTER
9/2/2022    Andrew Elizalde MD  [address] [facility] [address]    RE: Pascual Esteswrocki       Dear Colleague,     I had the pleasure of seeing Pascual Perea in the [facility].  HPI and Plan:   Mr Perea is a very pleasant 82-year-old gentleman with the history of CAD with history of CABG in 1997, atrial fibrillation flutter with history of cardioversion and patient was on amiodarone in the past but this was discontinued because of abnormal liver enzymes on apixaban for stroke prophylaxis recently dose decreased due to elevated creatinine,hospitalization for decompensated congestive heart failure in December 2021 in the setting of diuretics being discontinued a few months prior to admission due to hypercalcemia and patient was found to have congestive heart failure with moderately decreased RV systolic function with severe pulmonary hypertension, underwent diuresis and lost around 13 pounds of weight.  Patient also has history of sleep apnea diagnosed about 10 years ago and was on CPAP for short duration of time but has not been using it for many years.  I saw the patient following that hospitalization in February 2022.  He was going to get sleep study and if month or so at that time and the plan was to see him back in 2 to 3 months with repeat echocardiogram.  Today is coming for routine follow-up.  Patient tells me that he was very recently diagnosed with significant sleep apnea and started using CPAP on 9 August 2022.  He also had a repeat echocardiogram done around the same time that showed LVEF of 60% with moderate LVH moderately dilated RV with moderately decreased RV systolic function with moderate pulm hypertension with RVSP of 48 mmHg plus RA with mild to moderate aortic stenosis.  To be noted echocardiogram done in December 2021 showed RVSP of 70 mg weekly plus RA.  He also had a Holter evaluation done that showed rhythm to be persistent atrial fibrillation with average  ventricular rate of 80 bpm low of 61 high of 113.  Today is coming for routine follow-up.  Patient tells me overall health wise he is feeling quite well.  He is maintaining his body weight.  Recently he went [place] fair[place] with reasonable amount of walking and actually felt quite well.  With more strenuous physical activity he does get short of breath.  No orthopnea or exertional chest pain.  To be noted his ventricular rates in the setting of atrial fibrillation is quite well controlled without any need for AV taylor blocking agent.  No dizziness presyncope or syncope.  He also has hypertriglyceridemia and is on fenofibrate in addition to Lipitor.  Latest lipid panel shows LDL 34 triglycerides 269 HDL 34.  He is starting apixaban 5 mg twice daily quite well without any bleeding issues.  Latest creatinine is 1.53 but over the past several months most of the creatinine values are less than 1.5.    Assessment plan    1.    History of hospitalization for congestive heart failure in December 2021.  Appeared  mostly right-sided congestive heart failure may be some element of preserved ejection fraction congestive heart failure.  Left-sided filling pressures on E to E prime ratio were in intermediate range on  echocardiogram during that hospitalization.  Evidence of moderately decreased RV systolic function.  Underwent 13 pound of diuresis.    Has done well since that time.  Today on examination overall appears euvolemic.  He has slowly gained weight back but has been stable since June 2022 around 210 pounds.  2.  Severe pulmonary hypertension.    Was initially noted in the setting of volume overload.  This was new finding on echocardiogram in December 2021.  To be noted he was on chronic anticoagulation so less likely to be PE or chronic thromboembolic disease.  Has untreated sleep apnea which could be a factor.  Repeat echo shows still significant pulm hypertension although better than before however to be  noted the CPAP treatment for sleep apnea was only started around the same time that the repeat echo was done earlier last month.  3.  Moderate decreased RV systolic function in the setting of severe pulmonary hypertension.  Clinically does not appear to be in decompensated congestive heart failure today with JVP appears normal no pedal edema on examination today.  4.  CAD with history of CABG in 1997.  Stress test in June 2021 showing small apical infarct.  Clinically no anginal symptoms.  On high intensity statin LDL well controlled.  5.  Chronic atrial fibrillation flutter with recent EKG showing fibrillation flutter, ventricular rates well controlled without any need for AV taylor blocking agents.  Recent Holter showed well-controlled ventricular rates with rhythm to be atrial fibrillation.  No dizziness presyncope syncope Was on amiodarone in the past but this was discontinued because of abnormal liver enzymes.  Chads 2 Vascor of at least 6.  On apixaban for stroke prophylaxis.    Discussed with patient that with creatinine above 1.5 2.5 mg twice daily is the dose but his creatinine has fluctuated around 1.5 with more readings below 1.5 than above and given his body weight and the fact that he is done well with 5 mg twice daily without any bleeding issues we both made a mutually shared decision to continue 5 mg twice daily.  In future if creatinine remains persistently elevated beyond 1.5 we will decrease back to 2.5 mg twice daily.  6.  Obstructive sleep apnea.    Started on CPAP last month.  7.  Mild to moderate aortic valve stenosis.  9.  Chronic kidney disease stage III.    Overall creatinine stable.    Recommendations  Cardiac MRI stress perfusion.  Discussed with patient the rational for it.  This will help us evaluate RV function in more detail.  This will also help us look for any evidence of inducible ischemia and also to rule out infiltrative disease like amyloidosis concern for which was raised on  recent echocardiogram in the setting of LVH.  Educated patient not to consume any caffeine for 12 hours before the stress test.  I want to reevaluate PA pressure and RV function after some length of treatment for sleep apnea which he started only last month and the last echocardiogram was done around the same time.  I recommend repeating an echocardiogram in about 3 months time.  If there is still significant pulmonary hypertension despite treatment for sleep apnea would recommend tighter catheterization.  For reasons noted above we will continue 5 mg twice daily of apixaban at this time.  Follow-up in 3 months with the echocardiogram and cardiac MRI stress perfusion.    50 minutes of total time was spent today including review of recent tests    Orders Placed This Encounter   Procedures     Basic metabolic panel     Follow-Up with Cardiology     Echocardiogram Complete       Orders Placed This Encounter   Medications     apixaban ANTICOAGULANT (ELIQUIS) 5 MG tablet     Sig: Take 1 tablet (5 mg) by mouth 2 times daily     Dispense:  180 tablet     Refill:  3     torsemide (DEMADEX) 10 MG tablet     Sig: Take 1 tablet (10 mg) by mouth daily Profile only. Pt doesn't require refill at this time     Dispense:  90 tablet     Refill:  1       Medications Discontinued During This Encounter   Medication Reason     apixaban ANTICOAGULANT (ELIQUIS) 5 MG tablet Reorder     torsemide (DEMADEX) 10 MG tablet Reorder         Encounter Diagnoses   Name Primary?     Heart failure with preserved ejection fraction, NYHA class II (H)      Atrial fibrillation, unspecified type (H)        CURRENT MEDICATIONS:  Current Outpatient Medications   Medication Sig Dispense Refill     apixaban ANTICOAGULANT (ELIQUIS) 5 MG tablet Take 1 tablet (5 mg) by mouth 2 times daily 180 tablet 3     atorvastatin (LIPITOR) 40 MG tablet TAKE 1 TABLET (40 MG) BY MOUTH DAILY 90 tablet 3     azelastine (ASTELIN) 0.1 % nasal spray USE 2 SPRAYS INTO BOTH NOSTRILS  TWO TIMES A DAY 90 mL 3     blood glucose (ACCU-CHEK NORMAN PLUS) test strip USE TO TEST BLOOD SUGAR 3 TIMES A DAY OR AS DIRECTED 100 strip 11     blood glucose calibration (ACCU-CHEK NORMAN) solution USE AS DIRECTED 1 each 0     Continuous Blood Gluc  (FREESTYLE DELFINA 2 READER) CHRYSTAL 1 Device continuous 1 each 1     Continuous Blood Gluc Sensor (FREESTYLE DELFINA 2 SENSOR) [facility] APPLY 1 DEVICE TO TEST BLOOD SUGAR, CHANGE EVERY 14 DAYS 6 each 3     fenofibrate micronized (LOFIBRA) 67 MG capsule TAKE ONE CAPSULE BY MOUTH EVERY MORNING BEFORE BREAKFAST 30 capsule 8     glucose 4 g CHEW Take 1 tablet by mouth every hour as needed for low blood sugar        insulin glargine (LANTUS PEN) 100 UNIT/ML pen Inject 66-68 Units Subcutaneous At Bedtime 75 mL 3     levothyroxine (SYNTHROID/LEVOTHROID) 100 MCG tablet TAKE ONE TABLET BY MOUTH ONCE DAILY 90 tablet 3     nitroGLYcerin (NITROSTAT) 0.4 MG sublingual tablet Place 1 tablet (0.4 mg) under the tongue every 5 minutes as needed for chest pain 25 tablet 1     NOVOFINE AUTOCOVER PEN NEEDLE 30G X 8 MM miscellaneous USE AS DIRECTED FOUR TIMES A  each 2     NOVOLOG FLEXPEN 100 UNIT/ML soln INJECT SUBCUTANEOUSLY THREE TIMES A DAY (BEFORE MEALS). 3 UNITS PER CARB CHOICE (15 GM) APPROX 10-15 UNITS/ MEAL 45 mL 1     torsemide (DEMADEX) 10 MG tablet Take 1 tablet (10 mg) by mouth daily Profile only. Pt doesn't require refill at this time 90 tablet 1       ALLERGIES     Allergies   Allergen Reactions     Omeprazole      diarrhea     Pantoprazole      diarrhea       PAST MEDICAL HISTORY:  Past Medical History:   Diagnosis Date     Adhesive capsulitis of shoulder      Anemia 03/10/2014     Anemia, unspecified      Antiplatelet or antithrombotic long-term use      Arrhythmia     Atrial fib/flutter     CAD (coronary artery disease)      Gastroparesis     delayed emptying study May 2022     History of cardioversion 04/24/2018    a single synchronized shock of 120 joules restored  normal sinus rhythm     History of cardioversion 02/13/2019    single shock , 200 joules successul in restoring NSR     Hyperlipidemia LDL goal <70 05/26/2011     Hypertension      Hypothyroidism      Impotence of organic origin      Laceration of finger 06/2010     left thumb s/p sutures     Numbness and tingling     diabetic neuropathy bilateral feet     BRANDON (obstructive sleep apnea)     intolerant of CPAP, uses it occasionally.  Using mandibular device occasionally     Osteopenia      Pulmonary hypertension (H) 04/06/2012     Sensorineural hearing loss, unspecified      Stented coronary artery 1997    CABG      Testosterone deficiency      Type 2 diabetes mellitus without complication  (goal A1C<8) 10/24/2015     Typical atrial flutter (H) 04/18/2016     Unspecified hypothyroidism      Vitamin D deficiency 09/03/2011       PAST SURGICAL HISTORY:  Past Surgical History:   Procedure Laterality Date     ANESTHESIA CARDIOVERSION N/A 02/13/2019    Procedure: ANESTHESIA CARDIOVERSION;  Surgeon: GENERIC ANESTHESIA PROVIDER;  Location:  OR     ANESTHESIA CARDIOVERSION N/A 05/22/2019    Procedure: ANESTHESIA, FOR CARDIOVERSION;  Surgeon: GENERIC ANESTHESIA PROVIDER;  Location:  OR     CARDIAC SURGERY      bypass in 1997     CARDIOVERSION  04/24/2018     COLONOSCOPY       CORONARY ANGIOGRAPHY ADULT ORDER      4/2/2012     CORONARY ARTERY BYPASS  1997    [facility]     ENDOSCOPIC ULTRASOUND UPPER GASTROINTESTINAL TRACT (GI) N/A 03/14/2019    Procedure: ENDOSCOPIC ULTRASOUND OF UPPER GASTROINTESTINAL TRACT;  Surgeon: Ju Torres MD;  Location:  OR     ESOPHAGOSCOPY, GASTROSCOPY, DUODENOSCOPY (EGD), COMBINED N/A 4/15/2022    Procedure: ESOPHAGOGASTRODUODENOSCOPY (EGD);  Surgeon: Erik Baca DO;  Location:  GI     EXCISE MASS HEAD Left 08/18/2016    Procedure: EXCISE MASS HEAD;  Surgeon: Ivan Hernandez MD;  Location:  SD     HEART CATH, ANGIOPLASTY  04/2012      LAPAROSCOPIC CHOLECYSTECTOMY N/A 2019    Procedure: LAPAROSCOPIC CHOLECYSTECTOMY;  Surgeon: Janel Paz MD;  Location:  OR     PHACOEMULSIFICATION CLEAR CORNEA WITH STANDARD INTRAOCULAR LENS IMPLANT Left 2015    Procedure: PHACOEMULSIFICATION CLEAR CORNEA WITH STANDARD INTRAOCULAR LENS IMPLANT;  Surgeon: Nixon Farnsworth MD;  Location:  EC     PHACOEMULSIFICATION CLEAR CORNEA WITH STANDARD INTRAOCULAR LENS IMPLANT Right 2015    Procedure: PHACOEMULSIFICATION CLEAR CORNEA WITH STANDARD INTRAOCULAR LENS IMPLANT;  Surgeon: Nixon Farnsworth MD;  Location:  EC     SEPTOPLASTY, TURBINOPLASTY, COMBINED Bilateral 2016    Procedure: COMBINED SEPTOPLASTY, TURBINOPLASTY;  Surgeon: Ivan Hernandez MD;  Location:  SD     Z NONSPECIFIC PROCEDURE      CABG ([place])     Z NONSPECIFIC PROCEDURE  2001    stress echo       FAMILY HISTORY:  Family History   Problem Relation Age of Onset     Genitourinary Problems Father         d: age 89; ARF     Hypertension Father      Diabetes Mother         d: age 68, CAD     Heart Disease Mother         MI     Myocardial Infarction Mother      Cardiovascular Brother         d:  age 31; CAD     Heart Disease Brother         age 31, MI     Diabetes Sister         b:  1939; DM2     Diabetes Sister        SOCIAL HISTORY:  Social History     Socioeconomic History     Marital status:      Spouse name: None     Number of children: None     Years of education: None     Highest education level: None   Tobacco Use     Smoking status: Former Smoker     Packs/day: 1.00     Years: 6.00     Pack years: 6.00     Types: Cigarettes, Cigars, Pipe     Start date:      Quit date: 1964     Years since quittin.2     Smokeless tobacco: Never Used   Substance and Sexual Activity     Alcohol use: Yes     Comment: couple drinks a year     Drug use: No     Sexual activity: Yes     Partners: Female     Birth control/protection: Abstinence  "  Other Topics Concern     Parent/sibling w/ CABG, MI or angioplasty before 65F 55M? No     Caffeine Concern Yes     Comment: 2 cups coffee a day     Sleep Concern Yes     Comment: sleep apnea, wears cpap off and on     Stress Concern No     Weight Concern No     Special Diet Yes     Comment: diabetic diet      Exercise No     Comment: occ walk the mall     Seat Belt Yes       Review of Systems:  Skin:          Eyes:         ENT:         Respiratory:  Positive for shortness of breath;sleep apnea;CPAP     Cardiovascular:  Negative;palpitations;chest pain;syncope or near-syncope;cyanosis;lightheadedness;dizziness;edema   Gastroenterology:        Genitourinary:         Musculoskeletal:         Neurologic:         Psychiatric:         Heme/Lymph/Imm:         Endocrine:  Positive for thyroid disorder;diabetes      Physical Exam:  Vitals: /75 (BP Location: Left arm, Cuff Size: Adult Large)   Pulse 56   Ht 1.803 m (5' 11\")   Wt 95.3 kg (210 lb)   BMI 29.29 kg/m      General patient appears comfortable  Neck normal JVP, negative hepatojugular reflux  Cardiovascular system irregular, normal rate, grade 2 x 6 ejection systolic murmur heard over the right upper sternal border.  Respiratory system clear to auscultation   Extremities minimal pitting pedal edema  Neurological alert, oriented      CC  Referred Self,    Thank you for allowing me to participate in the care of your patient.      Sincerely,     Phoenix Adams MD     [facility]  "

## 2022-09-02 NOTE — PROGRESS NOTES
HPI and Plan:   Mr Perea is a very pleasant 82-year-old gentleman with the history of CAD with history of CABG in 1997, atrial fibrillation flutter with history of cardioversion and patient was on amiodarone in the past but this was discontinued because of abnormal liver enzymes on apixaban for stroke prophylaxis recently dose decreased due to elevated creatinine,hospitalization for decompensated congestive heart failure in December 2021 in the setting of diuretics being discontinued a few months prior to admission due to hypercalcemia and patient was found to have congestive heart failure with moderately decreased RV systolic function with severe pulmonary hypertension, underwent diuresis and lost around 13 pounds of weight.  Patient also has history of sleep apnea diagnosed about 10 years ago and was on CPAP for short duration of time but has not been using it for many years.  I saw the patient following that hospitalization in February 2022.  He was going to get sleep study and if month or so at that time and the plan was to see him back in 2 to 3 months with repeat echocardiogram.  Today is coming for routine follow-up.  Patient tells me that he was very recently diagnosed with significant sleep apnea and started using CPAP on 9 August 2022.  He also had a repeat echocardiogram done around the same time that showed LVEF of 60% with moderate LVH moderately dilated RV with moderately decreased RV systolic function with moderate pulm hypertension with RVSP of 48 mmHg plus RA with mild to moderate aortic stenosis.  To be noted echocardiogram done in December 2021 showed RVSP of 70 mg weekly plus RA.  He also had a Holter evaluation done that showed rhythm to be persistent atrial fibrillation with average ventricular rate of 80 bpm low of 61 high of 113.  Today is coming for routine follow-up.  Patient tells me overall health wise he is feeling quite well.  He is maintaining his body weight.  Recently he went to  several [facility] fair's with reasonable amount of walking and actually felt quite well.  With more strenuous physical activity he does get short of breath.  No orthopnea or exertional chest pain.  To be noted his ventricular rates in the setting of atrial fibrillation is quite well controlled without any need for AV taylor blocking agent.  No dizziness presyncope or syncope.  He also has hypertriglyceridemia and is on fenofibrate in addition to Lipitor.  Latest lipid panel shows LDL 34 triglycerides 269 HDL 34.  He is starting apixaban 5 mg twice daily quite well without any bleeding issues.  Latest creatinine is 1.53 but over the past several months most of the creatinine values are less than 1.5.    Assessment plan    1.    History of hospitalization for congestive heart failure in December 2021.  Appeared  mostly right-sided congestive heart failure may be some element of preserved ejection fraction congestive heart failure.  Left-sided filling pressures on E to E prime ratio were in intermediate range on  echocardiogram during that hospitalization.  Evidence of moderately decreased RV systolic function.  Underwent 13 pound of diuresis.    Has done well since that time.  Today on examination overall appears euvolemic.  He has slowly gained weight back but has been stable since June 2022 around 210 pounds.  2.  Severe pulmonary hypertension.    Was initially noted in the setting of volume overload.  This was new finding on echocardiogram in December 2021.  To be noted he was on chronic anticoagulation so less likely to be PE or chronic thromboembolic disease.  Has untreated sleep apnea which could be a factor.  Repeat echo shows still significant pulm hypertension although better than before however to be noted the CPAP treatment for sleep apnea was only started around the same time that the repeat echo was done earlier last month.  3.  Moderate decreased RV systolic function in the setting of severe pulmonary  hypertension.  Clinically does not appear to be in decompensated congestive heart failure today with JVP appears normal no pedal edema on examination today.  4.  CAD with history of CABG in 1997.  Stress test in June 2021 showing small apical infarct.  Clinically no anginal symptoms.  On high intensity statin LDL well controlled.  5.  Chronic atrial fibrillation flutter with recent EKG showing fibrillation flutter, ventricular rates well controlled without any need for AV taylor blocking agents.  Recent Holter showed well-controlled ventricular rates with rhythm to be atrial fibrillation.  No dizziness presyncope syncope Was on amiodarone in the past but this was discontinued because of abnormal liver enzymes.  Chads 2 Vascor of at least 6.  On apixaban for stroke prophylaxis.    Discussed with patient that with creatinine above 1.5 2.5 mg twice daily is the dose but his creatinine has fluctuated around 1.5 with more readings below 1.5 than above and given his body weight and the fact that he is done well with 5 mg twice daily without any bleeding issues we both made a mutually shared decision to continue 5 mg twice daily.  In future if creatinine remains persistently elevated beyond 1.5 we will decrease back to 2.5 mg twice daily.  6.  Obstructive sleep apnea.    Started on CPAP last month.  7.  Mild to moderate aortic valve stenosis.  9.  Chronic kidney disease stage III.    Overall creatinine stable.    Recommendations  Cardiac MRI stress perfusion.  Discussed with patient the rational for it.  This will help us evaluate RV function in more detail.  This will also help us look for any evidence of inducible ischemia and also to rule out infiltrative disease like amyloidosis concern for which was raised on recent echocardiogram in the setting of LVH.  Educated patient not to consume any caffeine for 12 hours before the stress test.  I want to reevaluate PA pressure and RV function after some length of treatment for  sleep apnea which he started only last month and the last echocardiogram was done around the same time.  I recommend repeating an echocardiogram in about 3 months time.  If there is still significant pulmonary hypertension despite treatment for sleep apnea would recommend tighter catheterization.  For reasons noted above we will continue 5 mg twice daily of apixaban at this time.  Follow-up in 3 months with the echocardiogram and cardiac MRI stress perfusion.    50 minutes of total time was spent today including review of recent tests    Orders Placed This Encounter   Procedures     Basic metabolic panel     Follow-Up with Cardiology     Echocardiogram Complete       Orders Placed This Encounter   Medications     apixaban ANTICOAGULANT (ELIQUIS) 5 MG tablet     Sig: Take 1 tablet (5 mg) by mouth 2 times daily     Dispense:  180 tablet     Refill:  3     torsemide (DEMADEX) 10 MG tablet     Sig: Take 1 tablet (10 mg) by mouth daily Profile only. Pt doesn't require refill at this time     Dispense:  90 tablet     Refill:  1       Medications Discontinued During This Encounter   Medication Reason     apixaban ANTICOAGULANT (ELIQUIS) 5 MG tablet Reorder     torsemide (DEMADEX) 10 MG tablet Reorder         Encounter Diagnoses   Name Primary?     Heart failure with preserved ejection fraction, NYHA class II (H)      Atrial fibrillation, unspecified type (H)        CURRENT MEDICATIONS:  Current Outpatient Medications   Medication Sig Dispense Refill     apixaban ANTICOAGULANT (ELIQUIS) 5 MG tablet Take 1 tablet (5 mg) by mouth 2 times daily 180 tablet 3     atorvastatin (LIPITOR) 40 MG tablet TAKE 1 TABLET (40 MG) BY MOUTH DAILY 90 tablet 3     azelastine (ASTELIN) 0.1 % nasal spray USE 2 SPRAYS INTO BOTH NOSTRILS TWO TIMES A DAY 90 mL 3     blood glucose (ACCU-CHEK NORMAN PLUS) test strip USE TO TEST BLOOD SUGAR 3 TIMES A DAY OR AS DIRECTED 100 strip 11     blood glucose calibration (ACCU-CHEK NORMAN) solution USE AS  DIRECTED 1 each 0     Continuous Blood Gluc  (FREESTYLE DELFINA 2 READER) CHRYSTAL 1 Device continuous 1 each 1     Continuous Blood Gluc Sensor (FREESTYLE DELFINA 2 SENSOR) [facility] APPLY 1 DEVICE TO TEST BLOOD SUGAR, CHANGE EVERY 14 DAYS 6 each 3     fenofibrate micronized (LOFIBRA) 67 MG capsule TAKE ONE CAPSULE BY MOUTH EVERY MORNING BEFORE BREAKFAST 30 capsule 8     glucose 4 g CHEW Take 1 tablet by mouth every hour as needed for low blood sugar        insulin glargine (LANTUS PEN) 100 UNIT/ML pen Inject 66-68 Units Subcutaneous At Bedtime 75 mL 3     levothyroxine (SYNTHROID/LEVOTHROID) 100 MCG tablet TAKE ONE TABLET BY MOUTH ONCE DAILY 90 tablet 3     nitroGLYcerin (NITROSTAT) 0.4 MG sublingual tablet Place 1 tablet (0.4 mg) under the tongue every 5 minutes as needed for chest pain 25 tablet 1     NOVOFINE AUTOCOVER PEN NEEDLE 30G X 8 MM miscellaneous USE AS DIRECTED FOUR TIMES A  each 2     NOVOLOG FLEXPEN 100 UNIT/ML soln INJECT SUBCUTANEOUSLY THREE TIMES A DAY (BEFORE MEALS). 3 UNITS PER CARB CHOICE (15 GM) APPROX 10-15 UNITS/ MEAL 45 mL 1     torsemide (DEMADEX) 10 MG tablet Take 1 tablet (10 mg) by mouth daily Profile only. Pt doesn't require refill at this time 90 tablet 1       ALLERGIES     Allergies   Allergen Reactions     Omeprazole      diarrhea     Pantoprazole      diarrhea       PAST MEDICAL HISTORY:  Past Medical History:   Diagnosis Date     Adhesive capsulitis of shoulder      Anemia 03/10/2014     Anemia, unspecified      Antiplatelet or antithrombotic long-term use      Arrhythmia     Atrial fib/flutter     CAD (coronary artery disease)      Gastroparesis     delayed emptying study May 2022     History of cardioversion 04/24/2018    a single synchronized shock of 120 joules restored normal sinus rhythm     History of cardioversion 02/13/2019    single shock , 200 joules successul in restoring NSR     Hyperlipidemia LDL goal <70 05/26/2011     Hypertension      Hypothyroidism       Impotence of organic origin      Laceration of finger 06/2010     left thumb s/p sutures     Numbness and tingling     diabetic neuropathy bilateral feet     BRANDON (obstructive sleep apnea)     intolerant of CPAP, uses it occasionally.  Using mandibular device occasionally     Osteopenia      Pulmonary hypertension (H) 04/06/2012     Sensorineural hearing loss, unspecified      Stented coronary artery 1997    CABG      Testosterone deficiency      Type 2 diabetes mellitus without complication  (goal A1C<8) 10/24/2015     Typical atrial flutter (H) 04/18/2016     Unspecified hypothyroidism      Vitamin D deficiency 09/03/2011       PAST SURGICAL HISTORY:  Past Surgical History:   Procedure Laterality Date     ANESTHESIA CARDIOVERSION N/A 02/13/2019    Procedure: ANESTHESIA CARDIOVERSION;  Surgeon: GENERIC ANESTHESIA PROVIDER;  Location:  OR     ANESTHESIA CARDIOVERSION N/A 05/22/2019    Procedure: ANESTHESIA, FOR CARDIOVERSION;  Surgeon: GENERIC ANESTHESIA PROVIDER;  Location:  OR     CARDIAC SURGERY      bypass in 1997     CARDIOVERSION  04/24/2018     COLONOSCOPY       CORONARY ANGIOGRAPHY ADULT ORDER      4/2/2012     CORONARY ARTERY BYPASS  1997    [facility]     ENDOSCOPIC ULTRASOUND UPPER GASTROINTESTINAL TRACT (GI) N/A 03/14/2019    Procedure: ENDOSCOPIC ULTRASOUND OF UPPER GASTROINTESTINAL TRACT;  Surgeon: Ju Torres MD;  Location:  OR     ESOPHAGOSCOPY, GASTROSCOPY, DUODENOSCOPY (EGD), COMBINED N/A 4/15/2022    Procedure: ESOPHAGOGASTRODUODENOSCOPY (EGD);  Surgeon: Erik Baca DO;  Location:  GI     EXCISE MASS HEAD Left 08/18/2016    Procedure: EXCISE MASS HEAD;  Surgeon: Ivan Hernandez MD;  Location:  SD     HEART CATH, ANGIOPLASTY  04/2012     LAPAROSCOPIC CHOLECYSTECTOMY N/A 03/17/2019    Procedure: LAPAROSCOPIC CHOLECYSTECTOMY;  Surgeon: Janel Paz MD;  Location:  OR     PHACOEMULSIFICATION CLEAR CORNEA WITH STANDARD INTRAOCULAR LENS  IMPLANT Left 2015    Procedure: PHACOEMULSIFICATION CLEAR CORNEA WITH STANDARD INTRAOCULAR LENS IMPLANT;  Surgeon: Nixon Farnsworth MD;  Location:  EC     PHACOEMULSIFICATION CLEAR CORNEA WITH STANDARD INTRAOCULAR LENS IMPLANT Right 2015    Procedure: PHACOEMULSIFICATION CLEAR CORNEA WITH STANDARD INTRAOCULAR LENS IMPLANT;  Surgeon: Nixon Farnsworth MD;  Location:  EC     SEPTOPLASTY, TURBINOPLASTY, COMBINED Bilateral 2016    Procedure: COMBINED SEPTOPLASTY, TURBINOPLASTY;  Surgeon: Ivan Hernandez MD;  Location:  SD     [facility] [place] NONSPECIFIC PROCEDURE      CABG ([place])     [facility] [place] NONSPECIFIC PROCEDURE  2001    stress echo       FAMILY HISTORY:  Family History   Problem Relation Age of Onset     Genitourinary Problems Father         d: age 89; ARF     Hypertension Father      Diabetes Mother         d: age 68, CAD     Heart Disease Mother         MI     Myocardial Infarction Mother      Cardiovascular Brother         d:  age 31; CAD     Heart Disease Brother         age 31, MI     Diabetes Sister         b:  1939; DM2     Diabetes Sister        SOCIAL HISTORY:  Social History     Socioeconomic History     Marital status:      Spouse name: None     Number of children: None     Years of education: None     Highest education level: None   Tobacco Use     Smoking status: Former Smoker     Packs/day: 1.00     Years: 6.00     Pack years: 6.00     Types: Cigarettes, Cigars, Pipe     Start date:      Quit date: 1964     Years since quittin.2     Smokeless tobacco: Never Used   Substance and Sexual Activity     Alcohol use: Yes     Comment: couple drinks a year     Drug use: No     Sexual activity: Yes     Partners: Female     Birth control/protection: Abstinence   Other Topics Concern     Parent/sibling w/ CABG, MI or angioplasty before 65F 55M? No     Caffeine Concern Yes     Comment: 2 cups coffee a day     Sleep Concern Yes     Comment: sleep apnea, wears cpap  "off and on     Stress Concern No     Weight Concern No     Special Diet Yes     Comment: diabetic diet      Exercise No     Comment: occ walk the mall     Seat Belt Yes       Review of Systems:  Skin:          Eyes:         ENT:         Respiratory:  Positive for shortness of breath;sleep apnea;CPAP     Cardiovascular:  Negative;palpitations;chest pain;syncope or near-syncope;cyanosis;lightheadedness;dizziness;edema   Gastroenterology:        Genitourinary:         Musculoskeletal:         Neurologic:         Psychiatric:         Heme/Lymph/Imm:         Endocrine:  Positive for thyroid disorder;diabetes      Physical Exam:  Vitals: /75 (BP Location: Left arm, Cuff Size: Adult Large)   Pulse 56   Ht 1.803 m (5' 11\")   Wt 95.3 kg (210 lb)   BMI 29.29 kg/m      General patient appears comfortable  Neck normal JVP, negative hepatojugular reflux  Cardiovascular system irregular, normal rate, grade 2 x 6 ejection systolic murmur heard over the right upper sternal border.  Respiratory system clear to auscultation   Extremities minimal pitting pedal edema  Neurological alert, oriented      CC  Referred Self, MD  No address on file                  "

## 2022-09-09 ENCOUNTER — DOCUMENTATION ONLY (OUTPATIENT)
Dept: SLEEP MEDICINE | Facility: CLINIC | Age: 82
End: 2022-09-09

## 2022-09-09 NOTE — PROGRESS NOTES
30 DAY STM VISIT    Diagnostic AHI: 24.5  PSG    PAP settings:  CPAP MIN CPAP MAX 95TH % PRESSURE EPR RESMED SOFT RESPONSE SETTING   5.0 cm  H20 15.0 cm  H20 12.6 cm  H20  TWO OFF     Device type: Auto-CPAP  Mask type:  Nasal Pillows    Objective measures: 14 day rolling measures:    COMPLIANCE LEAK AHI AVERAGE USE IN MINUTES   100 % 19.52 4.98 457   GOAL >70% GOAL < 24 LPM GOAL <5 GOAL >240        Assessment: Pt meeting objective benchmarks.     Data only recheck   Action plan: pt to have 6 month STM visit  Patient has scheduled a follow up visit with Chrissy Goddard PA-C  on 9/21/2022.     Total time spent on accessing and interpreting remote patient PAP therapy data  10 minutes    Total time spent counseling, coaching  and reviewing PAP therapy data with patient  0 minutes     [id number] this call  [id number] no  at 3 or 14 day [facility]

## 2022-09-14 ASSESSMENT — SLEEP AND FATIGUE QUESTIONNAIRES
HOW LIKELY ARE YOU TO NOD OFF OR FALL ASLEEP WHILE WATCHING TV: MODERATE CHANCE OF DOZING
HOW LIKELY ARE YOU TO NOD OFF OR FALL ASLEEP WHILE LYING DOWN TO REST IN THE AFTERNOON WHEN CIRCUMSTANCES PERMIT: MODERATE CHANCE OF DOZING
HOW LIKELY ARE YOU TO NOD OFF OR FALL ASLEEP WHILE SITTING AND READING: MODERATE CHANCE OF DOZING
HOW LIKELY ARE YOU TO NOD OFF OR FALL ASLEEP WHILE SITTING INACTIVE IN A PUBLIC PLACE: WOULD NEVER DOZE
HOW LIKELY ARE YOU TO NOD OFF OR FALL ASLEEP WHEN YOU ARE A PASSENGER IN A CAR FOR AN HOUR WITHOUT A BREAK: SLIGHT CHANCE OF DOZING
HOW LIKELY ARE YOU TO NOD OFF OR FALL ASLEEP IN A CAR, WHILE STOPPED FOR A FEW MINUTES IN TRAFFIC: WOULD NEVER DOZE
HOW LIKELY ARE YOU TO NOD OFF OR FALL ASLEEP WHILE SITTING AND TALKING TO SOMEONE: WOULD NEVER DOZE
HOW LIKELY ARE YOU TO NOD OFF OR FALL ASLEEP WHILE SITTING QUIETLY AFTER LUNCH WITHOUT ALCOHOL: SLIGHT CHANCE OF DOZING

## 2022-09-21 ENCOUNTER — MYC MEDICAL ADVICE (OUTPATIENT)
Dept: SLEEP MEDICINE | Facility: CLINIC | Age: 82
End: 2022-09-21

## 2022-09-21 ENCOUNTER — OFFICE VISIT (OUTPATIENT)
Dept: SLEEP MEDICINE | Facility: CLINIC | Age: 82
End: 2022-09-21
Payer: COMMERCIAL

## 2022-09-21 VITALS
WEIGHT: 201.6 LBS | HEART RATE: 64 BPM | SYSTOLIC BLOOD PRESSURE: 133 MMHG | OXYGEN SATURATION: 97 % | DIASTOLIC BLOOD PRESSURE: 67 MMHG | HEIGHT: 71 IN | BODY MASS INDEX: 28.22 KG/M2

## 2022-09-21 DIAGNOSIS — I10 ESSENTIAL HYPERTENSION, BENIGN: ICD-10-CM

## 2022-09-21 DIAGNOSIS — I48.91 ATRIAL FIBRILLATION, UNSPECIFIED TYPE (H): ICD-10-CM

## 2022-09-21 DIAGNOSIS — I50.9 HEART FAILURE, UNSPECIFIED HF CHRONICITY, UNSPECIFIED HEART FAILURE TYPE (H): ICD-10-CM

## 2022-09-21 DIAGNOSIS — I27.20 PULMONARY HTN (H): ICD-10-CM

## 2022-09-21 DIAGNOSIS — G47.33 OSA (OBSTRUCTIVE SLEEP APNEA): Primary | ICD-10-CM

## 2022-09-21 PROCEDURE — 99214 OFFICE O/P EST MOD 30 MIN: CPT | Performed by: PHYSICIAN ASSISTANT

## 2022-09-21 NOTE — NURSING NOTE
Changed cpap pressure to cpap 13.0cm h2o, per RX, via modem.  Sent [employer] delayed message to schedule annual appointment.

## 2022-09-21 NOTE — NURSING NOTE
"Chief Complaint   Patient presents with     CPAP Follow Up     Compliance check        Initial /67   Pulse 64   Ht 1.803 m (5' 10.98\")   Wt 91.4 kg (201 lb 9.6 oz)   SpO2 97%   BMI 28.13 kg/m   Estimated body mass index is 28.13 kg/m  as calculated from the following:    Height as of this encounter: 1.803 m (5' 10.98\").    Weight as of this encounter: 91.4 kg (201 lb 9.6 oz).    Medication Reconciliation: complete    DME:yes   FVNASRIN   "

## 2022-09-21 NOTE — PATIENT INSTRUCTIONS
Your blood pressure was checked while you were in clinic today.  Please read the guidelines below about what these numbers mean and what you should do about them.  Your systolic blood pressure is the top number.  This is the pressure when the heart is pumping.  Your diastolic blood pressure is the bottom number.  This is the pressure in between beats.  If your systolic blood pressure is less than 120 and your diastolic blood pressure is less than 80, then your blood pressure is normal. There is nothing more that you need to do about it  If your systolic blood pressure is 120-139 or your diastolic blood pressure is 80-89, your blood pressure may be higher than it should be.  You should have your blood pressure re-checked within a year by a primary care provider.  If your systolic blood pressure is 140 or greater or your diastolic blood pressure is 90 or greater, you may have high blood pressure.  High blood pressure is treatable, but if left untreated over time it can put you at risk for heart attack, stroke, or kidney failure.  You should have your blood pressure re-checked by a primary care provider within the next four weeks.   Your BMI is Body mass index is 28.13 kg/m .    What is BMI?  Body mass index (BMI) is one way to tell whether you are at a healthy weight, overweight, or obese. It measures your weight in relation to your height.  A BMI of 18.5 to 24.9 is in the healthy range. A person with a BMI of 25 to 29.9 is considered overweight, and someone with a BMI of 30 or greater is considered obese.  Another way to find out if you are at risk for health problems caused by overweight and obesity is to measure your waist. If you are a woman and your waist is more than 35 inches, or if you are a man and your waist is more than 40 inches, your risk of disease may be higher.  More than two-thirds of American adults are considered overweight or obese. Being overweight or obese increases the risk for further weight  gain.  Excess weight may lead to heart disease and diabetes. Creating and following plans for healthy eating and physical activity may help you improve your health.    Methods for maintaining or losing weight.  Weight control is part of healthy lifestyle and includes exercise, emotional health, and healthy eating habits.  Careful eating habits lifelong is the mainstay of weight control.  Though there are significant health benefits from weight loss, long-term weight loss with diet alone may be very difficult to achieve- studies show long-term success with dietary management in less than 10% of people. Attaining a healthy weight may be especially difficult to achieve in those with severe obesity. In some cases, medications, devices and surgical management might be considered.    What can you do?  If you are overweight or obese and are interested in methods for weight loss, you should discuss this with your provider. In addition, we recommend that you review healthy life styles and methods for weight loss available through the National [employer] of [employer] patient information sites:   http://win.niddk.nih.gov/publications/index.htm

## 2022-09-21 NOTE — PROGRESS NOTES
[facility]   Outpatient Sleep Medicine  Sep 21, 2022       Name: Pascual Perea MRN# [id number]   Age: 82 year old YOB: 1940            Assessment and Plan:   1. BRANDON (obstructive sleep apnea)  2. Atrial fibrillation, unspecified type (H)  3. Pulmonary HTN (H)  4. Heart failure, unspecified HF chronicity, unspecified heart failure type (H)  5. Essential hypertension, benign    Patient's sleep apnea is adequately managed and well treated with current PAP settings [id number] with residual AHI of 5.1 events per hour. Compliance is excellent. Tolerating PAP well. Given cardiac history will transition him to set pressure of [id number] given 95%ile pressure 12.7 cm. Daytime symptoms and nighttime sleep quality are improved with use and will continue nightly therapy. Prescription renewed for mask/supplies today. Pascual Perea will follow up in about 1 year for annual CPAP visit, but of course sooner with any concerns.   - Comprehensive DME       Chief Complaint      Chief Complaint   Patient presents with     CPAP Follow Up     Compliance check             History of Present Illness:     Pascual Perea is a 82 year old male who presents to the clinic for follow-up of their moderate obstructive sleep apnea treated with CPAP.  Other past medical history includes allergic rhinitis, GERD, cirrhosis, hypothyroidism, type 2 diabetes mellitus, hyperparathyroidism, hypertension, A. fib/a flutter, CKD, pulmonary hypertension, heart failure.    Originally diagnosed with moderate BRANDON via PSG on 8/30/2010 with an AHI 19, desaturation roscoe 84%.  CPAP and MAD used following this study but did not use very long because did not tolerate either.    Presented to my clinic 5/5/2022 to re-establish care and patient was interested in restarting CPAP given concern it could be contributing to pulmonary HTN, heart failure, HTN.    Repeat sleep study was ordered to determine current sleep apnea severity and  "PSG completed 7/7/2022 (202#) showing moderate obstructive sleep apnea, exacerbated in supine sleep, with associated oxygen desaturations and mild hypoxemia-AHI 24.5, RDI 25.4, Supine AHI 57.0, REM AHI 15.5, Low O2 84.0%, Time Spent ?88% 7.0 minutes / Time Spent ?89% 18.7 minutes.  There was also an increased frequency of periodic limb movements of sleep with associated arousals-PLM index 68.2, arousal index 28.8.  One-lead EKG showed atrial fibrillation and T wave inversion (pre-existing). Sleep architecture showed all sleep stages present with normal sleep efficiency 96.1%, but fragmentation due to respiratory events and leg movements.      At follow-up visit 7/21/2022 orders placed to restart CPAP therapy at auto 5-15 cm H2O.  No treatment initiated for PLM's given denies restless legs or any leg discomfort at night.    Patient was set up with CPAP on 8/9/2022.  Returns today for insurance required compliance visit.    Overall, the patient rates their experience with PAP as 10 (0 poor, 10 great). Patient is using a nasal pillow mask. The mask is comfortable. The mask is not leaking. They are not snoring with the mask on. They are not having gasp arousals. They are having some dry mouth if forgets to refill humidifier but \"other than that nothing it's good\". They are not having pain/skin breakdown. The pressure settings are comfortable.     Bedtime is typically 11:00PM. Usually it takes a couple minutes to fall asleep with the mask on. Wake time is typically 7-8:00AM.  Patient is using PAP therapy 8 hours per night. The patient is usually getting 8-9 hours of sleep per night. Does feel well rested in AM. Feels he is sleeping better with less arousals since starting on CPAP. Wakes once per night for restroom.     ResMed Auto-PAP 5-15 cmH2O download (8/21/22-9/19/22):  30 total days of use. 0 nonuse days. 30 days with >4 hours use.  Average use 7 hours 54 minutes per day. Median Leak 1.5 L/min. 95%ile Leak 19.9 " "L/min. CPAP 95% pressure 12.7cm. AHI 5.1    SCALES:   INSOMNIA: Insomnia Severity Score: 0/28   SLEEPINESS: [place] Sleepiness Score: 8/24    Past medical/surgical history, family history, social history, medications and allergies were reviewed.           Physical Examination:   /67   Pulse 64   Ht 1.803 m (5' 10.98\")   Wt 91.4 kg (201 lb 9.6 oz)   SpO2 97%   BMI 28.13 kg/m    General appearance: Awake, alert, cooperative. Well groomed. Sitting comfortably in chair. In no apparent distress.  HEENT: Head: Normocephalic, atraumatic. Eyes:Conjunctiva clear. Sclera normal. Remainder of face covered by mask.   Pulmonary:  Able to speak easily in full sentences. No cough or wheeze.   Skin:  No rashes or significant lesions on visible skin.   Neurologic: Alert, oriented x3.   Psychiatric: Mood euthymic. Affect congruent with full range and intensity.      CC:  Andrew Elizalde PA-C  Sep 21, 2022     [facility]  [address] [place] [address]     [facility]  [address] [address]    Chart documentation was completed, in part, with [employer] voice-recognition software. Even though reviewed, some grammatical, spelling, and word errors may remain.    34 minutes spent on day of encounter doing chart review, history and exam, documentation, and further activities as noted above    "

## 2022-10-03 DIAGNOSIS — E03.9 HYPOTHYROIDISM, UNSPECIFIED TYPE: ICD-10-CM

## 2022-10-06 RX ORDER — LEVOTHYROXINE SODIUM 112 UG/1
112 TABLET ORAL DAILY
Qty: 90 TABLET | Refills: 3 | Status: SHIPPED | OUTPATIENT
Start: 2022-10-06 | End: 2023-08-08

## 2022-10-06 RX ORDER — LEVOTHYROXINE SODIUM 100 UG/1
TABLET ORAL
Qty: 90 TABLET | Refills: 2 | OUTPATIENT
Start: 2022-10-06

## 2022-10-06 NOTE — TELEPHONE ENCOUNTER
Routing refill request to provider for review/approval because:  Labs out of range:    TSH   Date Value Ref Range Status   08/03/2022 4.58 (H) 0.40 - 4.00 mU/L Final   09/25/2020 3.01 0.40 - 4.00 mU/L Final     Qi Turcios RN

## 2022-10-07 NOTE — TELEPHONE ENCOUNTER
Call patient.  Recent thyroid function showed thyroid function slightly low with levothyroxine 100 mcg tablet.  Patient to stop levothyroxine 100 mcg tablet and instead start a higher dose levothyroxine 112 mcg tablet, 1 tablet daily in the morning for thyroid function.  Prescription sent to patient's pharmacy.  Repeat nonfasting thyroid function labs in 6 weeks.  Future labs ordered.  Assist in scheduling lab appointment

## 2022-10-14 ENCOUNTER — HOSPITAL ENCOUNTER (OUTPATIENT)
Dept: CARDIOLOGY | Facility: CLINIC | Age: 82
Discharge: HOME | End: 2022-10-14
Attending: INTERNAL MEDICINE | Admitting: INTERNAL MEDICINE
Payer: COMMERCIAL

## 2022-10-14 ENCOUNTER — TELEPHONE (OUTPATIENT)
Dept: CARDIOLOGY | Facility: CLINIC | Age: 82
End: 2022-10-14

## 2022-10-14 ENCOUNTER — HOSPITAL ENCOUNTER (EMERGENCY)
Facility: CLINIC | Age: 82
Discharge: HOME | End: 2022-10-15
Attending: EMERGENCY MEDICINE | Admitting: EMERGENCY MEDICINE
Payer: COMMERCIAL

## 2022-10-14 VITALS — SYSTOLIC BLOOD PRESSURE: 125 MMHG | HEART RATE: 79 BPM | DIASTOLIC BLOOD PRESSURE: 59 MMHG

## 2022-10-14 DIAGNOSIS — R63.5 WEIGHT GAIN: ICD-10-CM

## 2022-10-14 DIAGNOSIS — I50.30 HEART FAILURE WITH PRESERVED EJECTION FRACTION, NYHA CLASS II (H): ICD-10-CM

## 2022-10-14 DIAGNOSIS — I50.9 ACUTE ON CHRONIC CONGESTIVE HEART FAILURE, UNSPECIFIED HEART FAILURE TYPE (H): ICD-10-CM

## 2022-10-14 LAB
ALBUMIN SERPL-MCNC: 3.8 G/DL (ref 3.4–5)
ALP SERPL-CCNC: 73 U/L (ref 40–150)
ALT SERPL W P-5'-P-CCNC: 57 U/L (ref 0–70)
ANION GAP SERPL CALCULATED.3IONS-SCNC: 8 MMOL/L (ref 3–14)
AST SERPL W P-5'-P-CCNC: 30 U/L (ref 0–45)
BASOPHILS # BLD AUTO: 0.1 10E3/UL (ref 0–0.2)
BASOPHILS NFR BLD AUTO: 1 %
BILIRUB SERPL-MCNC: 0.6 MG/DL (ref 0.2–1.3)
BUN SERPL-MCNC: 18 MG/DL (ref 7–30)
CALCIUM SERPL-MCNC: 10 MG/DL (ref 8.5–10.1)
CHLORIDE BLD-SCNC: 110 MMOL/L (ref 94–109)
CO2 SERPL-SCNC: 24 MMOL/L (ref 20–32)
CREAT SERPL-MCNC: 1.47 MG/DL (ref 0.66–1.25)
EOSINOPHIL # BLD AUTO: 0.1 10E3/UL (ref 0–0.7)
EOSINOPHIL NFR BLD AUTO: 3 %
ERYTHROCYTE [DISTWIDTH] IN BLOOD BY AUTOMATED COUNT: 14.6 % (ref 10–15)
GFR SERPL CREATININE-BSD FRML MDRD: 48 ML/MIN/1.73M2
GLUCOSE BLD-MCNC: 99 MG/DL (ref 70–99)
HCT VFR BLD AUTO: 33.3 % (ref 40–53)
HGB BLD-MCNC: 10.6 G/DL (ref 13.3–17.7)
IMM GRANULOCYTES # BLD: 0 10E3/UL
IMM GRANULOCYTES NFR BLD: 0 %
LYMPHOCYTES # BLD AUTO: 0.9 10E3/UL (ref 0.8–5.3)
LYMPHOCYTES NFR BLD AUTO: 20 %
MCH RBC QN AUTO: 28.1 PG (ref 26.5–33)
MCHC RBC AUTO-ENTMCNC: 31.8 G/DL (ref 31.5–36.5)
MCV RBC AUTO: 88 FL (ref 78–100)
MONOCYTES # BLD AUTO: 0.4 10E3/UL (ref 0–1.3)
MONOCYTES NFR BLD AUTO: 9 %
NEUTROPHILS # BLD AUTO: 2.9 10E3/UL (ref 1.6–8.3)
NEUTROPHILS NFR BLD AUTO: 67 %
NRBC # BLD AUTO: 0 10E3/UL
NRBC BLD AUTO-RTO: 0 /100
NT-PROBNP SERPL-MCNC: 1781 PG/ML (ref 0–1800)
PLATELET # BLD AUTO: 173 10E3/UL (ref 150–450)
POTASSIUM BLD-SCNC: 3.8 MMOL/L (ref 3.4–5.3)
PROT SERPL-MCNC: 6.7 G/DL (ref 6.8–8.8)
RBC # BLD AUTO: 3.77 10E6/UL (ref 4.4–5.9)
SODIUM SERPL-SCNC: 142 MMOL/L (ref 133–144)
TROPONIN I SERPL HS-MCNC: 60 NG/L
WBC # BLD AUTO: 4.3 10E3/UL (ref 4–11)

## 2022-10-14 PROCEDURE — 99285 EMERGENCY DEPT VISIT HI MDM: CPT | Mod: 25

## 2022-10-14 PROCEDURE — 75563 CARD MRI W/STRESS IMG & DYE: CPT | Mod: 26 | Performed by: INTERNAL MEDICINE

## 2022-10-14 PROCEDURE — C9803 HOPD COVID-19 SPEC COLLECT: HCPCS

## 2022-10-14 PROCEDURE — A9585 GADOBUTROL INJECTION: HCPCS | Performed by: INTERNAL MEDICINE

## 2022-10-14 PROCEDURE — 93005 ELECTROCARDIOGRAM TRACING: CPT | Mod: RTG

## 2022-10-14 PROCEDURE — 85025 COMPLETE CBC W/AUTO DIFF WBC: CPT | Performed by: EMERGENCY MEDICINE

## 2022-10-14 PROCEDURE — 96374 THER/PROPH/DIAG INJ IV PUSH: CPT

## 2022-10-14 PROCEDURE — 93016 CV STRESS TEST SUPVJ ONLY: CPT | Performed by: INTERNAL MEDICINE

## 2022-10-14 PROCEDURE — 93017 CV STRESS TEST TRACING ONLY: CPT

## 2022-10-14 PROCEDURE — 255N000002 HC RX 255 OP 636: Performed by: INTERNAL MEDICINE

## 2022-10-14 PROCEDURE — 84484 ASSAY OF TROPONIN QUANT: CPT | Performed by: EMERGENCY MEDICINE

## 2022-10-14 PROCEDURE — 36415 COLL VENOUS BLD VENIPUNCTURE: CPT | Performed by: EMERGENCY MEDICINE

## 2022-10-14 PROCEDURE — 250N000011 HC RX IP 250 OP 636: Performed by: INTERNAL MEDICINE

## 2022-10-14 PROCEDURE — 93018 CV STRESS TEST I&R ONLY: CPT | Performed by: INTERNAL MEDICINE

## 2022-10-14 PROCEDURE — 80053 COMPREHEN METABOLIC PANEL: CPT | Performed by: EMERGENCY MEDICINE

## 2022-10-14 PROCEDURE — 83880 ASSAY OF NATRIURETIC PEPTIDE: CPT | Performed by: EMERGENCY MEDICINE

## 2022-10-14 RX ORDER — REGADENOSON 0.08 MG/ML
0.4 INJECTION, SOLUTION INTRAVENOUS ONCE
Status: COMPLETED | OUTPATIENT
Start: 2022-10-14 | End: 2022-10-14

## 2022-10-14 RX ORDER — BUMETANIDE 0.25 MG/ML
2 INJECTION INTRAMUSCULAR; INTRAVENOUS ONCE
Status: COMPLETED | OUTPATIENT
Start: 2022-10-14 | End: 2022-10-15

## 2022-10-14 RX ORDER — METHYLPREDNISOLONE SODIUM SUCCINATE 125 MG/2ML
125 INJECTION, POWDER, LYOPHILIZED, FOR SOLUTION INTRAMUSCULAR; INTRAVENOUS
Status: DISCONTINUED | OUTPATIENT
Start: 2022-10-14 | End: 2022-10-15 | Stop reason: HOSPADM

## 2022-10-14 RX ORDER — ALBUTEROL SULFATE 90 UG/1
2 AEROSOL, METERED RESPIRATORY (INHALATION) EVERY 5 MIN PRN
Status: DISCONTINUED | OUTPATIENT
Start: 2022-10-14 | End: 2022-10-15 | Stop reason: HOSPADM

## 2022-10-14 RX ORDER — DIPHENHYDRAMINE HYDROCHLORIDE 50 MG/ML
25-50 INJECTION INTRAMUSCULAR; INTRAVENOUS
Status: DISCONTINUED | OUTPATIENT
Start: 2022-10-14 | End: 2022-10-15 | Stop reason: HOSPADM

## 2022-10-14 RX ORDER — ACYCLOVIR 200 MG/1
0-1 CAPSULE ORAL
Status: DISCONTINUED | OUTPATIENT
Start: 2022-10-14 | End: 2022-10-15 | Stop reason: HOSPADM

## 2022-10-14 RX ORDER — AMINOPHYLLINE 25 MG/ML
100 INJECTION, SOLUTION INTRAVENOUS ONCE
Status: DISCONTINUED | OUTPATIENT
Start: 2022-10-14 | End: 2022-10-15 | Stop reason: HOSPADM

## 2022-10-14 RX ORDER — CAFFEINE CITRATE 20 MG/ML
60 SOLUTION INTRAVENOUS
Status: DISCONTINUED | OUTPATIENT
Start: 2022-10-14 | End: 2022-10-15 | Stop reason: HOSPADM

## 2022-10-14 RX ORDER — GADOBUTROL 604.72 MG/ML
22 INJECTION INTRAVENOUS ONCE
Status: COMPLETED | OUTPATIENT
Start: 2022-10-14 | End: 2022-10-14

## 2022-10-14 RX ORDER — DIPHENHYDRAMINE HCL 25 MG
25 CAPSULE ORAL
Status: DISCONTINUED | OUTPATIENT
Start: 2022-10-14 | End: 2022-10-15 | Stop reason: HOSPADM

## 2022-10-14 RX ORDER — DIAZEPAM 5 MG
5 TABLET ORAL EVERY 30 MIN PRN
Status: DISCONTINUED | OUTPATIENT
Start: 2022-10-14 | End: 2022-10-15 | Stop reason: HOSPADM

## 2022-10-14 RX ORDER — ONDANSETRON 2 MG/ML
4 INJECTION INTRAMUSCULAR; INTRAVENOUS
Status: DISCONTINUED | OUTPATIENT
Start: 2022-10-14 | End: 2022-10-15 | Stop reason: HOSPADM

## 2022-10-14 RX ADMIN — GADOBUTROL 22 ML: 604.72 INJECTION INTRAVENOUS at 11:15

## 2022-10-14 RX ADMIN — REGADENOSON 0.4 MG: 0.08 INJECTION, SOLUTION INTRAVENOUS at 10:35

## 2022-10-14 NOTE — TELEPHONE ENCOUNTER
Received results from the Cardiac MRI testing today.      Routing to Dr. Adams to review prior to speaking to patient.  Results show that he does not appear to have Amyloid, which Dr. Adams wanted to rule out.    Quantitative LVEF 79%.  Stress is negative for inducible ischemia.    Krystyna Prabhakar RN on 10/14/2022 at 3:42 PM

## 2022-10-15 ENCOUNTER — APPOINTMENT (OUTPATIENT)
Dept: GENERAL RADIOLOGY | Facility: CLINIC | Age: 82
End: 2022-10-15
Attending: EMERGENCY MEDICINE
Payer: COMMERCIAL

## 2022-10-15 VITALS
OXYGEN SATURATION: 98 % | HEIGHT: 60 IN | DIASTOLIC BLOOD PRESSURE: 76 MMHG | HEART RATE: 53 BPM | SYSTOLIC BLOOD PRESSURE: 151 MMHG | BODY MASS INDEX: 41.23 KG/M2 | WEIGHT: 210 LBS | TEMPERATURE: 96.3 F

## 2022-10-15 LAB
HOLD SPECIMEN: NORMAL
SARS-COV-2 RNA RESP QL NAA+PROBE: NEGATIVE

## 2022-10-15 PROCEDURE — 250N000011 HC RX IP 250 OP 636: Performed by: EMERGENCY MEDICINE

## 2022-10-15 PROCEDURE — 71046 X-RAY EXAM CHEST 2 VIEWS: CPT

## 2022-10-15 PROCEDURE — U0005 INFEC AGEN DETEC AMPLI PROBE: HCPCS | Performed by: EMERGENCY MEDICINE

## 2022-10-15 RX ORDER — TORSEMIDE 10 MG/1
TABLET ORAL
Qty: 10 TABLET | Refills: 0 | Status: SHIPPED | OUTPATIENT
Start: 2022-10-15 | End: 2022-10-19

## 2022-10-15 RX ADMIN — BUMETANIDE 2 MG: 0.25 INJECTION, SOLUTION INTRAMUSCULAR; INTRAVENOUS at 00:20

## 2022-10-15 ASSESSMENT — ACTIVITIES OF DAILY LIVING (ADL): ADLS_ACUITY_SCORE: 35

## 2022-10-15 NOTE — DISCHARGE INSTRUCTIONS
You need to take extra torsemide this weekend to get extra fluid off.    Saturday & Sunday: Take 2 tabs (20mg) of torsemide in the morning and 1 tab (10mg) 6-8 hours later for two days.  Monday & Tuesday: Take 1 tab (10mg) in the morning and 1 tab 6 hours later for two days.  By this time you should have talked with cardiology about any ongoing dose adjustments.

## 2022-10-15 NOTE — ED PROVIDER NOTES
History   Chief Complaint:  Chest Pain (Left sided chest tightness for the last few weeks (intermittant) with concurrent SOB with exersion. Pt. Has CHF HX. Pt. States having one pound per day weight gain. )       SUNNI Perea is a 82 year old male with history of atrial fibrillation on Eliquis, CHF and CAD who presents with a 10 pound weight gain in the past week.  He has increasing exertional shortness of breath over this time.  He has intermittent chest tightness as well.  He has not noticed any leg swelling.  He feels similar to when he was admitted for CHF last December he had an MRI of his heart today which was previously scheduled and was not specifically due to the symptoms.  He has received the results.  He denies otherwise being in contact with cardiology about the symptoms.  He is currently on diuretics.  He denies any associated fevers, cough, back pain, abdominal symptoms.  He denies any bleeding from anywhere including nosebleeds or black or bloody stools.    10/14/22 (today) MRI Cardiac w/contrast and stress:  Normal left ventricular size with normal systolic function. Quantitative LVEF 79 %.  Mildly dilated right ventricle with normal RV systolic function.   Mild flattening of IV septum likely from RV volume and pressure overload.   Regadenoson induced stress perfusion is negative for ischemia.  Delayed hyperenhancement reveals non CAD scarring in the septum at the RV attachment region. This can be seen in patients with pulmonary hypertension. This does not appear to be evidence of infiltrative  cardiomyopathy like amyloid.     12/25/21 Echocardiogram Complete: Interpretation Summary  1. Left ventricular systolic function is normal. The visual ejection fraction  is 60-65%.  2. Flattened septum is consistent with RV pressure/volume overload.  3. The right ventricle is severely dilated. Moderately decreased right  ventricular systolic function.  4. There is severe biatrial dilatation.  5.  Mild aortic stenosis; mean gradient 15 mmHg, peak velocity 2.6 m/sec,  calculated valve area 1.4 cm2.  6. Severe pulmnary hypertension; The right ventricular systolic pressure is  approximated at 70mmHg plus the right atrial pressure. IVC diameter >2.1 cm  collapsing <50% with sniff suggests a high RA pressure estimated at 15 mmHg or  greater.  7. In comparison with the prior studies, severe pulmonary hypertension is  present with evidence for cor pulmonale.    Review of Systems  Ten system ROS reviewed and is negative except as above     Allergies:    Omeprazole  Pantoprazole    Medications:    apixaban ANTICOAGULANT (ELIQUIS) 5 MG tablet  atorvastatin (LIPITOR) 40 MG tablet  azelastine (ASTELIN) 0.1 % nasal spray  blood glucose (ACCU-CHEK NORMAN PLUS) test strip  blood glucose calibration (ACCU-CHEK NORMAN) solution  Continuous Blood Gluc  (FREESTYLE DELFINA 2 READER) CHRYSTAL  Continuous Blood Gluc Sensor (FREESTYLE DELFINA 2 SENSOR) MISC  fenofibrate micronized (LOFIBRA) 67 MG capsule  glucose 4 g CHEW  insulin glargine (LANTUS PEN) 100 UNIT/ML pen  levothyroxine (SYNTHROID/LEVOTHROID) 100 MCG tablet  levothyroxine (SYNTHROID/LEVOTHROID) 112 MCG tablet  nitroGLYcerin (NITROSTAT) 0.4 MG sublingual tablet  NOVOFINE AUTOCOVER PEN NEEDLE 30G X 8 MM miscellaneous  NOVOLOG FLEXPEN 100 UNIT/ML soln  torsemide (DEMADEX) 10 MG tablet        Past Medical History:       Patient Active Problem List   Diagnosis     Essential hypertension, benign     Coronary atherosclerosis     Impotence of organic origin     Allergic rhinitis     Testosterone deficiency     Hyperlipidemia LDL goal <70     Osteopenia     Advanced directives, counseling/discussion     Vitamin D deficiency     Health Care Home     BRANDON (obstructive sleep apnea)     Atrial flutter (H)     Gastroesophageal reflux disease without esophagitis     Hypothyroidism, unspecified type     Type 2 diabetes mellitus without complication, with long-term current use of insulin  (H)     Atrial fibrillation, unspecified type (H)     CKD (chronic kidney disease) stage 3, GFR 30-59 ml/min (H)     Hyperparathyroidism (H)     Other cirrhosis of liver (H)     Gastroparesis        Past Surgical History:      Past Surgical History:   Procedure Laterality Date     ANESTHESIA CARDIOVERSION N/A 02/13/2019    Procedure: ANESTHESIA CARDIOVERSION;  Surgeon: GENERIC ANESTHESIA PROVIDER;  Location:  OR     ANESTHESIA CARDIOVERSION N/A 05/22/2019    Procedure: ANESTHESIA, FOR CARDIOVERSION;  Surgeon: GENERIC ANESTHESIA PROVIDER;  Location:  OR     CARDIAC SURGERY      bypass in 1997     CARDIOVERSION  04/24/2018     COLONOSCOPY       CORONARY ANGIOGRAPHY ADULT ORDER      4/2/2012     CORONARY ARTERY BYPASS  1997    [facility]     ENDOSCOPIC ULTRASOUND UPPER GASTROINTESTINAL TRACT (GI) N/A 03/14/2019    Procedure: ENDOSCOPIC ULTRASOUND OF UPPER GASTROINTESTINAL TRACT;  Surgeon: Ju Torres MD;  Location:  OR     ESOPHAGOSCOPY, GASTROSCOPY, DUODENOSCOPY (EGD), COMBINED N/A 4/15/2022    Procedure: ESOPHAGOGASTRODUODENOSCOPY (EGD);  Surgeon: Erik Baca DO;  Location:  GI     EXCISE MASS HEAD Left 08/18/2016    Procedure: EXCISE MASS HEAD;  Surgeon: Ivan Hernandez MD;  Location:  SD     HEART CATH, ANGIOPLASTY  04/2012     LAPAROSCOPIC CHOLECYSTECTOMY N/A 03/17/2019    Procedure: LAPAROSCOPIC CHOLECYSTECTOMY;  Surgeon: Janel Paz MD;  Location:  OR     PHACOEMULSIFICATION CLEAR CORNEA WITH STANDARD INTRAOCULAR LENS IMPLANT Left 06/08/2015    Procedure: PHACOEMULSIFICATION CLEAR CORNEA WITH STANDARD INTRAOCULAR LENS IMPLANT;  Surgeon: Nixon Farnsworth MD;  Location:  EC     PHACOEMULSIFICATION CLEAR CORNEA WITH STANDARD INTRAOCULAR LENS IMPLANT Right 06/22/2015    Procedure: PHACOEMULSIFICATION CLEAR CORNEA WITH STANDARD INTRAOCULAR LENS IMPLANT;  Surgeon: Nixon Farnsworth MD;  Location:  EC     SEPTOPLASTY, TURBINOPLASTY, COMBINED  "Bilateral 08/18/2016    Procedure: COMBINED SEPTOPLASTY, TURBINOPLASTY;  Surgeon: Ivan Hernandez MD;  Location: [facility] NONSPECIFIC PROCEDURE  1997    [facility] ([employer]     [facility] NONSPECIFIC PROCEDURE  08/2001    stress echo        Family History:      Family History   Problem Relation Age of Onset     Genitourinary Problems Father         d: age 89; ARF     Hypertension Father      Diabetes Mother         d: age 68, CAD     Heart Disease Mother         MI     Myocardial Infarction Mother      Cardiovascular Brother         d:  age 31; CAD     Heart Disease Brother         age 31, MI     Diabetes Sister         b:  1939; DM2     Diabetes Sister        Social History:    PCP: Andrew Elizalde       Physical Exam     Patient Vitals for the past 24 hrs:   BP Temp Temp src Pulse SpO2 Height Weight   10/14/22 2259 -- -- -- -- -- 1.473 m (4' 10\") 95.3 kg (210 lb)   10/14/22 2254 (!) 154/58 (!) 96.3  F (35.7  C) Temporal 57 95 % 1.803 m (5' 11\") 95.3 kg (210 lb)   10/14/22 2052 -- -- -- -- 96 % -- --       Physical Exam  Eyes:  Sclera white; Pupils are equal and round  ENT:    External ears and nares normal  CV:  Rate as above with regular rhythm   Resp:  Breath sounds clear and equal bilaterally    Non-labored, no retractions or accessory muscle use  GI:  Abdomen is soft, non-tender, non-distended    No rebound tenderness or peritoneal features  MS:  Moves all extremities  Skin:  Warm and dry, sock marks on bilateral lower legs without pitting edema  Neuro:  Speech is normal and fluent. No apparent deficit.        Emergency Department Course   ECG  Time: 2258  Vent. Rate 53 bpm. MS interval 116. QRS duration 118. QT/QTc 492/461.  Read time: 2259 no STEMI  Interpretation: Sinus tachycardia with second-degree AV block with 3-1 AV conduction, left ventricular hypertrophy with QRS widening and repolarization abnormality, cannot rule out inferior infarct age undetermined, abnormal ECG  Interpreted by Dr. Brennan.  Agree.  No " significant change when reviewed compared to prior 10/14/2022 and 2/19/2022.       Imaging:  XR Chest 2 Views   Final Result   IMPRESSION: Mild pulmonary vascular redistribution. No focal airspace consolidation. No pleural effusion or pneumothorax.      Unchanged cardiomegaly. Postsurgical changes of median sternotomy and coronary artery bypass grafting.        Report per radiology    Laboratory:  Labs Ordered and Resulted from Time of ED Arrival to Time of ED Departure   COMPREHENSIVE METABOLIC PANEL - Abnormal       Result Value    Sodium 142      Potassium 3.8      Chloride 110 (*)     Carbon Dioxide (CO2) 24      Anion Gap 8      Urea Nitrogen 18      Creatinine 1.47 (*)     Calcium 10.0      Glucose 99      Alkaline Phosphatase 73      AST 30      ALT 57      Protein Total 6.7 (*)     Albumin 3.8      Bilirubin Total 0.6      GFR Estimate 48 (*)    CBC WITH PLATELETS AND DIFFERENTIAL - Abnormal    WBC Count 4.3      RBC Count 3.77 (*)     Hemoglobin 10.6 (*)     Hematocrit 33.3 (*)     MCV 88      MCH 28.1      MCHC 31.8      RDW 14.6      Platelet Count 173      % Neutrophils 67      % Lymphocytes 20      % Monocytes 9      % Eosinophils 3      % Basophils 1      % Immature Granulocytes 0      NRBCs per 100 WBC 0      Absolute Neutrophils 2.9      Absolute Lymphocytes 0.9      Absolute Monocytes 0.4      Absolute Eosinophils 0.1      Absolute Basophils 0.1      Absolute Immature Granulocytes 0.0      Absolute NRBCs 0.0     TROPONIN I - Normal    Troponin I High Sensitivity 60     NT PROBNP INPATIENT - Normal    N terminal Pro BNP Inpatient 1,781     COVID-19 VIRUS (CORONAVIRUS) BY PCR - Normal    SARS CoV2 PCR Negative          Procedures      Emergency Department Course:       Assessments:  11:50pm - walking with pulse oximeter went down the schuler to the bathroom and back, sats 95-75%    Interventions:  Medications   bumetanide (BUMEX) injection 2 mg (2 mg Intravenous Given 10/15/22 0020)         Disposition:  The patient was discharged to home.     Impression & Plan     Medical Decision Making:  EKG w/o ischemia, new dysrhythmia, or pericarditis.  Lab evaluation shows troponin and BNP in similar ranges to prior.  Chest x-ray does not show marked pulmonary edema or pleural effusion.  He does not have acute renal insufficiency.  Hemoglobin is just slightly lower than baseline.  I discussed this with him and do not anticipate it would be causing symptoms.  He denies any known sources of bleeding.  This can be rechecked and if downtrending then would warrant further evaluation.  He was not hypoxic in the room but with a exertional symptoms, this was also checked while walking.  He was able to walk down the hallway and back without desaturation and felt fine doing so.  He had a normal stress test today without evidence of ischemic changes.  EF and other cardiac function are similar to prior.  I believe at this point he would benefit from additional diuresis.  He has urinated twice here since receiving IV diuretics.  We discussed increasing his outpatient diuretics with close follow-up with cardiology versus being in the hospital for this.  I gave him instructions for temporarily increasing his Bumex prior to following up with cardiology.  Return immediately if symptoms are worsening despite this therapy.    Diagnosis:    ICD-10-CM    1. Acute on chronic congestive heart failure, unspecified heart failure type (H)  I50.9 Adult Cardiology Eval  Referral      2. Weight gain  R63.5 Adult Cardiology Eval  Referral          Discharge Medications:  Discharge Medication List as of 10/15/2022  2:03 AM      START taking these medications    Details   !! torsemide (DEMADEX) 10 MG tablet Take 2 tabs (20mg) in the morning and 1 tab (10mg) 6 hours later for two days.  Then take 1 tab (10mg) in the morning and 1 tab 6 hours later for two days.  Then take 1 tab in the morning., Disp-10 tablet, R-0,  E-Prescribe       !! - Potential duplicate medications found. Please discuss with provider.             Theresa Brennan MD  10/15/22 [id number]

## 2022-10-15 NOTE — ED TRIAGE NOTES
Triage Assessment     Row Name 10/14/22 3406       Triage Assessment (Adult)    Airway WDL WDL       Respiratory WDL    Respiratory WDL X  SOB with exersion       Skin Circulation/Temperature WDL    Skin Circulation/Temperature WDL WDL       Cardiac WDL    Cardiac WDL X  slight left CP       Peripheral/Neurovascular WDL    Peripheral Neurovascular WDL WDL       Cognitive/Neuro/Behavioral WDL    Cognitive/Neuro/Behavioral WDL WDL            Left sided chest tightness for the last few weeks (intermittant) with concurrent SOB with exersion. Pt. Has CHF HX. Pt. States having one pound per day weight gain.

## 2022-10-17 LAB
ATRIAL RATE - MUSE: 156 BPM
DIASTOLIC BLOOD PRESSURE - MUSE: NORMAL MMHG
INTERPRETATION ECG - MUSE: NORMAL
P AXIS - MUSE: 92 DEGREES
PR INTERVAL - MUSE: 116 MS
QRS DURATION - MUSE: 118 MS
QT - MUSE: 492 MS
QTC - MUSE: 461 MS
R AXIS - MUSE: 75 DEGREES
SYSTOLIC BLOOD PRESSURE - MUSE: NORMAL MMHG
T AXIS - MUSE: 228 DEGREES
VENTRICULAR RATE- MUSE: 53 BPM

## 2022-10-19 ENCOUNTER — OFFICE VISIT (OUTPATIENT)
Dept: CARDIOLOGY | Facility: CLINIC | Age: 82
End: 2022-10-19
Attending: INTERNAL MEDICINE
Payer: COMMERCIAL

## 2022-10-19 ENCOUNTER — LAB (OUTPATIENT)
Dept: LAB | Facility: CLINIC | Age: 82
End: 2022-10-19
Payer: COMMERCIAL

## 2022-10-19 VITALS
DIASTOLIC BLOOD PRESSURE: 76 MMHG | WEIGHT: 209 LBS | HEART RATE: 78 BPM | SYSTOLIC BLOOD PRESSURE: 126 MMHG | BODY MASS INDEX: 29.26 KG/M2 | HEIGHT: 71 IN

## 2022-10-19 DIAGNOSIS — I50.9 ACUTE ON CHRONIC CONGESTIVE HEART FAILURE, UNSPECIFIED HEART FAILURE TYPE (H): ICD-10-CM

## 2022-10-19 DIAGNOSIS — R63.5 WEIGHT GAIN: ICD-10-CM

## 2022-10-19 LAB
ANION GAP SERPL CALCULATED.3IONS-SCNC: 3 MMOL/L (ref 3–14)
BUN SERPL-MCNC: 25 MG/DL (ref 7–30)
CALCIUM SERPL-MCNC: 10.5 MG/DL (ref 8.5–10.1)
CHLORIDE BLD-SCNC: 104 MMOL/L (ref 94–109)
CO2 SERPL-SCNC: 31 MMOL/L (ref 20–32)
CREAT SERPL-MCNC: 1.63 MG/DL (ref 0.66–1.25)
GFR SERPL CREATININE-BSD FRML MDRD: 42 ML/MIN/1.73M2
GLUCOSE BLD-MCNC: 127 MG/DL (ref 70–99)
POTASSIUM BLD-SCNC: 4 MMOL/L (ref 3.4–5.3)
SODIUM SERPL-SCNC: 138 MMOL/L (ref 133–144)

## 2022-10-19 PROCEDURE — 99214 OFFICE O/P EST MOD 30 MIN: CPT | Performed by: INTERNAL MEDICINE

## 2022-10-19 PROCEDURE — 80048 BASIC METABOLIC PNL TOTAL CA: CPT | Performed by: INTERNAL MEDICINE

## 2022-10-19 PROCEDURE — 36415 COLL VENOUS BLD VENIPUNCTURE: CPT | Performed by: INTERNAL MEDICINE

## 2022-10-19 PROCEDURE — 93000 ELECTROCARDIOGRAM COMPLETE: CPT | Performed by: INTERNAL MEDICINE

## 2022-10-19 RX ORDER — TORSEMIDE 10 MG/1
20 TABLET ORAL DAILY
Qty: 180 TABLET | Refills: 3 | Status: SHIPPED | OUTPATIENT
Start: 2022-10-19 | End: 2023-11-21

## 2022-10-19 NOTE — LETTER
10/19/2022    Andrew Elizalde MD  [address] [facility] [address]    RE: Pascual Esteswrocki       Dear Colleague,     I had the pleasure of seeing Pascual Perea in the [facility].  HPI and Plan:   Mr Perea is a very pleasant 82-year-old gentleman with the history of CAD with history of CABG in 1997, atrial fibrillation flutter with history of cardioversion and patient was on amiodarone in the past but this was discontinued because of abnormal liver enzymes on apixaban for stroke prophylaxis recently dose decreased due to elevated creatinine,hospitalization for decompensated congestive heart failure in December 2021 in the setting of diuretics being discontinued a few months prior to admission due to hypercalcemia and patient was found to have congestive heart failure with moderately decreased RV systolic function with severe pulmonary hypertension, underwent diuresis and lost around 13 pounds of weight.  Patient also has history of sleep apnea diagnosed about 10 years ago and was on CPAP for short duration of time but had not been using it for many years.   Patient tells me that he was very recently diagnosed with significant sleep apnea and started using CPAP on 9 August 2022.  He also had a repeat echocardiogram done around the same time that showed LVEF of 60% with moderate LVH moderately dilated RV with moderately decreased RV systolic function with moderate pulm hypertension with RVSP of 48 mmHg plus RA with mild to moderate aortic stenosis.  To be noted echocardiogram done in December 2021 showed RVSP of 70 mg weekly plus RA.  He also had a Holter evaluation done that showed rhythm to be persistent atrial fibrillation with average ventricular rate of 80 bpm low of 61 high of 113.    I saw the patient about 1-1/2 months ago and recommended cardiac MRI stress perfusion and also recommended repeating an echocardiogram in about 3 months or so to allow enough time for CPAP to have effect  and reevaluate pulmonary hypertension.  Patient underwent cardiac MRI last week that did not reveal any inducible ischemia.  Normal LV systolic function, mildly dilated RV with normal RV systolic function with mild flattening of interventricular septum possibly from RV volume and pressure overload.  Some non CAD scarring in the septum with RV and attachment regions which could be seen with pulmonary hypertension.  No evidence of infiltrative cardiomyopathy.  Patient was also seen in the ER around the same time because of weight gain of about 8 to 10 pounds with some shortness of breath and shortness of breath with exertion.  He was diagnosed with some decompressed congestive heart failure in the ER and his torsemide was increased from 10 mg every day to 20 mg in the morning and 10 mg in the evening for a few days and now on 20 mg daily.  Today patient is coming to the clinic.  Patient tells me he is feeling back to his baseline.  He has noticed significant improvement in shortness of breath.  No orthopnea no chest discomfort.  EKG today shows atrial fibrillation with controlled ventricular rate.  He denies any dizziness presyncope or syncope.  He is now weighing 201 pounds at home which is seems to be his dry baseline weight.  He is careful with salt intake.  He is now using CPAP on a regular basis for past 2 months or so.  In addition to torsemide he is on apixaban, Lipitor.  He also has mild to moderate aortic valve stenosis and chronic kidney disease stage III.    Assessment plan  1.  History of hospitalization for congestive heart failure in December 2021.  Appeared  mostly right-sided congestive heart failure may be some element of preserved ejection fraction congestive heart failure.  Left-sided filling pressures on E to E prime ratio were in intermediate range on  echocardiogram during that hospitalization.  Evidence of moderately decreased RV systolic function.  Underwent 13 pound of diuresis.    Has done  well since that time except visit to the ER last week for shortness of breath and dyspnea on exertion and about 10 pounds of weight gain.  Has responded well to escalation of diuretic with the back to baseline weight of 201 pounds at home.  Appears euvolemic on examination today..    No evidence of infiltrative cardiomyopathy on recent cardiac MRI.  Recommend doing an BMP today as diuretics have been recently increased.  2.  Severe pulmonary hypertension.    Was initially noted in the setting of volume overload.  This was new finding on echocardiogram in December 2021.  To be noted he was on chronic anticoagulation so less likely to be PE or chronic thromboembolic disease.  Has untreated sleep apnea which could be a factor.  Repeat echo shows still significant pulm hypertension although better than before however to be noted the CPAP treatment for sleep apnea was only started around the same time that the repeat echo was done in August 2022.  Plan is to repeat echocardiogram in December to allow sufficient time for sleep apnea treatment to have effect.  Also discussed with patient option of right heart catheterization today he prefers to wait and repeat echocardiogram as already planned as he is already feeling much better after escalation of diuresis.  3.  Moderate decreased RV systolic function in the setting of severe pulmonary hypertension.  Clinically does not appear to be in decompensated congestive heart failure today with JVP appears normal no pedal edema on examination today.  4.  CAD with history of CABG in 1997.  Stress test in June 2021 showing small apical infarct.  Clinically no anginal symptoms.  On high intensity statin LDL well controlled.  Cardiac meds stress perfusion in October 2022 with no evidence of inducible ischemia.  5.  Chronic atrial fibrillation flutter with recent EKG showing fibrillation flutter, ventricular rates well controlled without any need for AV taylor blocking agents.  Recent  Holter showed well-controlled ventricular rates with rhythm to be atrial fibrillation.  No dizziness presyncope syncope Was on amiodarone in the past but this was discontinued because of abnormal liver enzymes.  Chads 2 Vascor of at least 6.  On apixaban for stroke prophylaxis.    Discussed with patient that with creatinine above 1.5 2.5 mg twice daily is the dose but his creatinine has fluctuated around 1.5 with more readings below 1.5 than above and given his body weight and the fact that he is done well with 5 mg twice daily without any bleeding issues we both made a mutually shared decision to continue 5 mg twice daily.  In future if creatinine remains persistently elevated beyond 1.5 we will decrease back to 2.5 mg twice daily.  6.  Obstructive sleep apnea.    Started on CPAP last month.  7.  Mild to moderate aortic valve stenosis.  9.  Chronic kidney disease stage III.       Recommendations  Continue torsemide 20 mg daily  Check BMP  Follow-up in 2 months with repeat echocardiogram which is already been scheduled.  If there is evidence of significant pulm hypertension despite treatment for sleep apnea on repeat echocardiogram will revisit the option of right heart catheterization with patient.       Orders Placed This Encounter   Procedures     Basic metabolic panel     Follow-Up with Cardiology     EKG 12-lead complete w/read - Clinics (performed today)       Orders Placed This Encounter   Medications     torsemide (DEMADEX) 10 MG tablet     Sig: Take 2 tablets (20 mg) by mouth daily     Dispense:  180 tablet     Refill:  3       Medications Discontinued During This Encounter   Medication Reason     torsemide (DEMADEX) 10 MG tablet Dose adjustment     torsemide (DEMADEX) 10 MG tablet Reorder         Encounter Diagnoses   Name Primary?     Acute on chronic congestive heart failure, unspecified heart failure type (H)      Weight gain        CURRENT MEDICATIONS:  Current Outpatient Medications   Medication Sig  Dispense Refill     apixaban ANTICOAGULANT (ELIQUIS) 5 MG tablet Take 1 tablet (5 mg) by mouth 2 times daily 180 tablet 3     atorvastatin (LIPITOR) 40 MG tablet TAKE 1 TABLET (40 MG) BY MOUTH DAILY 90 tablet 3     azelastine (ASTELIN) 0.1 % nasal spray USE 2 SPRAYS INTO BOTH NOSTRILS TWO TIMES A DAY 90 mL 3     blood glucose (ACCU-CHEK NORMAN PLUS) test strip USE TO TEST BLOOD SUGAR 3 TIMES A DAY OR AS DIRECTED 100 strip 11     blood glucose calibration (ACCU-CHEK NORMAN) solution USE AS DIRECTED 1 each 0     Continuous Blood Gluc  (FREESTYLE DELFINA 2 READER) CHRYSTAL 1 Device continuous 1 each 1     Continuous Blood Gluc Sensor (FREESTYLE DELFINA 2 SENSOR) [facility] APPLY 1 DEVICE TO TEST BLOOD SUGAR, CHANGE EVERY 14 DAYS 6 each 3     fenofibrate micronized (LOFIBRA) 67 MG capsule TAKE ONE CAPSULE BY MOUTH EVERY MORNING BEFORE BREAKFAST 30 capsule 8     glucose 4 g CHEW Take 1 tablet by mouth every hour as needed for low blood sugar        insulin glargine (LANTUS PEN) 100 UNIT/ML pen Inject 66-68 Units Subcutaneous At Bedtime 75 mL 3     levothyroxine (SYNTHROID/LEVOTHROID) 112 MCG tablet Take 1 tablet (112 mcg) by mouth daily 90 tablet 3     nitroGLYcerin (NITROSTAT) 0.4 MG sublingual tablet Place 1 tablet (0.4 mg) under the tongue every 5 minutes as needed for chest pain 25 tablet 1     NOVOFINE AUTOCOVER PEN NEEDLE 30G X 8 MM miscellaneous USE AS DIRECTED FOUR TIMES A  each 2     NOVOLOG FLEXPEN 100 UNIT/ML soln INJECT SUBCUTANEOUSLY THREE TIMES A DAY (BEFORE MEALS). 3 UNITS PER CARB CHOICE (15 GM) APPROX 10-15 UNITS/ MEAL 45 mL 1     torsemide (DEMADEX) 10 MG tablet Take 2 tablets (20 mg) by mouth daily 180 tablet 3     levothyroxine (SYNTHROID/LEVOTHROID) 100 MCG tablet TAKE ONE TABLET BY MOUTH ONCE DAILY (Patient not taking: Reported on 10/19/2022) 90 tablet 3       ALLERGIES     Allergies   Allergen Reactions     Omeprazole      diarrhea     Pantoprazole      diarrhea       PAST MEDICAL HISTORY:  Past  Medical History:   Diagnosis Date     Adhesive capsulitis of shoulder      Anemia 03/10/2014     Anemia, unspecified      Antiplatelet or antithrombotic long-term use      Arrhythmia     Atrial fib/flutter     CAD (coronary artery disease)      Gastroparesis     delayed emptying study May 2022     History of cardioversion 04/24/2018    a single synchronized shock of 120 joules restored normal sinus rhythm     History of cardioversion 02/13/2019    single shock , 200 joules successul in restoring NSR     Hyperlipidemia LDL goal <70 05/26/2011     Hypertension      Hypothyroidism      Impotence of organic origin      Laceration of finger 06/2010     left thumb s/p sutures     Numbness and tingling     diabetic neuropathy bilateral feet     BRANDON (obstructive sleep apnea)     intolerant of CPAP, uses it occasionally.  Using mandibular device occasionally     Osteopenia      Pulmonary hypertension (H) 04/06/2012     Sensorineural hearing loss, unspecified      Stented coronary artery 1997    CABG      Testosterone deficiency      Type 2 diabetes mellitus without complication  (goal A1C<8) 10/24/2015     Typical atrial flutter (H) 04/18/2016     Unspecified hypothyroidism      Vitamin D deficiency 09/03/2011       PAST SURGICAL HISTORY:  Past Surgical History:   Procedure Laterality Date     ANESTHESIA CARDIOVERSION N/A 02/13/2019    Procedure: ANESTHESIA CARDIOVERSION;  Surgeon: GENERIC ANESTHESIA PROVIDER;  Location:  OR     ANESTHESIA CARDIOVERSION N/A 05/22/2019    Procedure: ANESTHESIA, FOR CARDIOVERSION;  Surgeon: GENERIC ANESTHESIA PROVIDER;  Location:  OR     CARDIAC SURGERY      bypass in 1997     CARDIOVERSION  04/24/2018     COLONOSCOPY       CORONARY ANGIOGRAPHY ADULT ORDER      4/2/2012     CORONARY ARTERY BYPASS  1997    [facility] to LAD     ENDOSCOPIC ULTRASOUND UPPER GASTROINTESTINAL TRACT (GI) N/A 03/14/2019    Procedure: ENDOSCOPIC ULTRASOUND OF UPPER GASTROINTESTINAL TRACT;  Surgeon:  Ju Torres MD;  Location:  OR     ESOPHAGOSCOPY, GASTROSCOPY, DUODENOSCOPY (EGD), COMBINED N/A 4/15/2022    Procedure: ESOPHAGOGASTRODUODENOSCOPY (EGD);  Surgeon: Erik Baca DO;  Location:  GI     EXCISE MASS HEAD Left 08/18/2016    Procedure: EXCISE MASS HEAD;  Surgeon: Ivan Hernandez MD;  Location: [facility]     HEART CATH, ANGIOPLASTY  04/2012     LAPAROSCOPIC CHOLECYSTECTOMY N/A 03/17/2019    Procedure: LAPAROSCOPIC CHOLECYSTECTOMY;  Surgeon: Janel Paz MD;  Location:  OR     PHACOEMULSIFICATION CLEAR CORNEA WITH STANDARD INTRAOCULAR LENS IMPLANT Left 06/08/2015    Procedure: PHACOEMULSIFICATION CLEAR CORNEA WITH STANDARD INTRAOCULAR LENS IMPLANT;  Surgeon: Nixon Farnsworth MD;  Location:  EC     PHACOEMULSIFICATION CLEAR CORNEA WITH STANDARD INTRAOCULAR LENS IMPLANT Right 06/22/2015    Procedure: PHACOEMULSIFICATION CLEAR CORNEA WITH STANDARD INTRAOCULAR LENS IMPLANT;  Surgeon: Nixon Farnsworth MD;  Location:  EC     SEPTOPLASTY, TURBINOPLASTY, COMBINED Bilateral 08/18/2016    Procedure: COMBINED SEPTOPLASTY, TURBINOPLASTY;  Surgeon: Ivan Hernandez MD;  Location: [facility]     ZZC NONSPECIFIC PROCEDURE  1997    CABG (Hinduism)     [facility] NONSPECIFIC PROCEDURE  08/2001    stress echo       FAMILY HISTORY:  Family History   Problem Relation Age of Onset     Genitourinary Problems Father         d: age 89; ARF     Hypertension Father      Diabetes Mother         d: age 68, CAD     Heart Disease Mother         MI     Myocardial Infarction Mother      Cardiovascular Brother         d:  age 31; CAD     Heart Disease Brother         age 31, MI     Diabetes Sister         b:  1939; DM2     Diabetes Sister        SOCIAL HISTORY:  Social History     Socioeconomic History     Marital status:      Spouse name: None     Number of children: None     Years of education: None     Highest education level: None   Tobacco Use     Smoking status: Former     Packs/day: 1.00  "    Years: 6.00     Pack years: 6.00     Types: Cigarettes, Cigars, Pipe     Start date:      Quit date: 1964     Years since quittin.3     Smokeless tobacco: Never   Substance and Sexual Activity     Alcohol use: Not Currently     Comment: couple drinks a year     Drug use: No     Sexual activity: Yes     Partners: Female     Birth control/protection: Abstinence   Other Topics Concern     Parent/sibling w/ CABG, MI or angioplasty before 65F 55M? No     Caffeine Concern Yes     Comment: 2 cups coffee a day     Sleep Concern Yes     Comment: sleep apnea, wears cpap off and on     Stress Concern No     Weight Concern No     Special Diet Yes     Comment: diabetic diet      Exercise No     Comment: occ walk the mall     Seat Belt Yes       Review of Systems:  Skin:          Eyes:         ENT:         Respiratory:  Positive for   PEREZ with stairs   Cardiovascular:  Negative;palpitations;dizziness;syncope or near-syncope;lightheadedness;cyanosis no exertional chest pain.    Gastroenterology:        Genitourinary:         Musculoskeletal:         Neurologic:         Psychiatric:         Heme/Lymph/Imm:         Endocrine:  Positive for thyroid disorder;diabetes      Physical Exam:  Vitals: /76   Pulse 78   Ht 1.803 m (5' 11\")   Wt 94.8 kg (209 lb)   BMI 29.15 kg/m      General patient appears comfortable  Neck normal JVP, negative hepatojugular reflux  Cardiovascular system slightly irregular, grade 2 x 6 ejection systolic murmur over the right upper sternal border, no S3-S4 rub or gallop  Respite system clear to auscultation  Extremities no edema      CC  Phoenix Adams MD  [address] [facility] [address]  [place] [address]    Thank you for allowing me to participate in the care of your patient.      Sincerely,     Phoenix Adams MD     [facility]  "

## 2022-10-19 NOTE — PROGRESS NOTES
HPI and Plan:   Mr Perea is a very pleasant 82-year-old gentleman with the history of CAD with history of CABG in 1997, atrial fibrillation flutter with history of cardioversion and patient was on amiodarone in the past but this was discontinued because of abnormal liver enzymes on apixaban for stroke prophylaxis recently dose decreased due to elevated creatinine,hospitalization for decompensated congestive heart failure in December 2021 in the setting of diuretics being discontinued a few months prior to admission due to hypercalcemia and patient was found to have congestive heart failure with moderately decreased RV systolic function with severe pulmonary hypertension, underwent diuresis and lost around 13 pounds of weight.  Patient also has history of sleep apnea diagnosed about 10 years ago and was on CPAP for short duration of time but had not been using it for many years.   Patient tells me that he was very recently diagnosed with significant sleep apnea and started using CPAP on 9 August 2022.  He also had a repeat echocardiogram done around the same time that showed LVEF of 60% with moderate LVH moderately dilated RV with moderately decreased RV systolic function with moderate pulm hypertension with RVSP of 48 mmHg plus RA with mild to moderate aortic stenosis.  To be noted echocardiogram done in December 2021 showed RVSP of 70 mg weekly plus RA.  He also had a Holter evaluation done that showed rhythm to be persistent atrial fibrillation with average ventricular rate of 80 bpm low of 61 high of 113.    I saw the patient about 1-1/2 months ago and recommended cardiac MRI stress perfusion and also recommended repeating an echocardiogram in about 3 months or so to allow enough time for CPAP to have effect and reevaluate pulmonary hypertension.  Patient underwent cardiac MRI last week that did not reveal any inducible ischemia.  Normal LV systolic function, mildly dilated RV with normal RV systolic function  with mild flattening of interventricular septum possibly from RV volume and pressure overload.  Some non CAD scarring in the septum with RV and attachment regions which could be seen with pulmonary hypertension.  No evidence of infiltrative cardiomyopathy.  Patient was also seen in the ER around the same time because of weight gain of about 8 to 10 pounds with some shortness of breath and shortness of breath with exertion.  He was diagnosed with some decompressed congestive heart failure in the ER and his torsemide was increased from 10 mg every day to 20 mg in the morning and 10 mg in the evening for a few days and now on 20 mg daily.  Today patient is coming to the clinic.  Patient tells me he is feeling back to his baseline.  He has noticed significant improvement in shortness of breath.  No orthopnea no chest discomfort.  EKG today shows atrial fibrillation with controlled ventricular rate.  He denies any dizziness presyncope or syncope.  He is now weighing 201 pounds at home which is seems to be his dry baseline weight.  He is careful with salt intake.  He is now using CPAP on a regular basis for past 2 months or so.  In addition to torsemide he is on apixaban, Lipitor.  He also has mild to moderate aortic valve stenosis and chronic kidney disease stage III.    Assessment plan  1.  History of hospitalization for congestive heart failure in December 2021.  Appeared  mostly right-sided congestive heart failure may be some element of preserved ejection fraction congestive heart failure.  Left-sided filling pressures on E to E prime ratio were in intermediate range on  echocardiogram during that hospitalization.  Evidence of moderately decreased RV systolic function.  Underwent 13 pound of diuresis.    Has done well since that time except visit to the ER last week for shortness of breath and dyspnea on exertion and about 10 pounds of weight gain.  Has responded well to escalation of diuretic with the back to  baseline weight of 201 pounds at home.  Appears euvolemic on examination today..    No evidence of infiltrative cardiomyopathy on recent cardiac MRI.  Recommend doing an BMP today as diuretics have been recently increased.  2.  Severe pulmonary hypertension.    Was initially noted in the setting of volume overload.  This was new finding on echocardiogram in December 2021.  To be noted he was on chronic anticoagulation so less likely to be PE or chronic thromboembolic disease.  Has untreated sleep apnea which could be a factor.  Repeat echo shows still significant pulm hypertension although better than before however to be noted the CPAP treatment for sleep apnea was only started around the same time that the repeat echo was done in August 2022.  Plan is to repeat echocardiogram in December to allow sufficient time for sleep apnea treatment to have effect.  Also discussed with patient option of right heart catheterization today he prefers to wait and repeat echocardiogram as already planned as he is already feeling much better after escalation of diuresis.  3.  Moderate decreased RV systolic function in the setting of severe pulmonary hypertension.  Clinically does not appear to be in decompensated congestive heart failure today with JVP appears normal no pedal edema on examination today.  4.  CAD with history of CABG in 1997.  Stress test in June 2021 showing small apical infarct.  Clinically no anginal symptoms.  On high intensity statin LDL well controlled.  Cardiac meds stress perfusion in October 2022 with no evidence of inducible ischemia.  5.  Chronic atrial fibrillation flutter with recent EKG showing fibrillation flutter, ventricular rates well controlled without any need for AV taylor blocking agents.  Recent Holter showed well-controlled ventricular rates with rhythm to be atrial fibrillation.  No dizziness presyncope syncope Was on amiodarone in the past but this was discontinued because of abnormal liver  enzymes.  Chads 2 Vascor of at least 6.  On apixaban for stroke prophylaxis.    Discussed with patient that with creatinine above 1.5 2.5 mg twice daily is the dose but his creatinine has fluctuated around 1.5 with more readings below 1.5 than above and given his body weight and the fact that he is done well with 5 mg twice daily without any bleeding issues we both made a mutually shared decision to continue 5 mg twice daily.  In future if creatinine remains persistently elevated beyond 1.5 we will decrease back to 2.5 mg twice daily.  6.  Obstructive sleep apnea.    Started on CPAP last month.  7.  Mild to moderate aortic valve stenosis.  9.  Chronic kidney disease stage III.       Recommendations  Continue torsemide 20 mg daily  Check BMP  Follow-up in 2 months with repeat echocardiogram which is already been scheduled.  If there is evidence of significant pulm hypertension despite treatment for sleep apnea on repeat echocardiogram will revisit the option of right heart catheterization with patient.       Orders Placed This Encounter   Procedures     Basic metabolic panel     Follow-Up with Cardiology     EKG 12-lead complete w/read - Clinics (performed today)       Orders Placed This Encounter   Medications     torsemide (DEMADEX) 10 MG tablet     Sig: Take 2 tablets (20 mg) by mouth daily     Dispense:  180 tablet     Refill:  3       Medications Discontinued During This Encounter   Medication Reason     torsemide (DEMADEX) 10 MG tablet Dose adjustment     torsemide (DEMADEX) 10 MG tablet Reorder         Encounter Diagnoses   Name Primary?     Acute on chronic congestive heart failure, unspecified heart failure type (H)      Weight gain        CURRENT MEDICATIONS:  Current Outpatient Medications   Medication Sig Dispense Refill     apixaban ANTICOAGULANT (ELIQUIS) 5 MG tablet Take 1 tablet (5 mg) by mouth 2 times daily 180 tablet 3     atorvastatin (LIPITOR) 40 MG tablet TAKE 1 TABLET (40 MG) BY MOUTH DAILY 90  tablet 3     azelastine (ASTELIN) 0.1 % nasal spray USE 2 SPRAYS INTO BOTH NOSTRILS TWO TIMES A DAY 90 mL 3     blood glucose (ACCU-CHEK NORMAN PLUS) test strip USE TO TEST BLOOD SUGAR 3 TIMES A DAY OR AS DIRECTED 100 strip 11     blood glucose calibration (ACCU-CHEK NORMAN) solution USE AS DIRECTED 1 each 0     Continuous Blood Gluc  (FREESTYLE DELFINA 2 READER) CHRYSTAL 1 Device continuous 1 each 1     Continuous Blood Gluc Sensor (FREESTYLE DELFINA 2 SENSOR) [facility] 1 DEVICE TO TEST BLOOD SUGAR, CHANGE EVERY 14 DAYS 6 each 3     fenofibrate micronized (LOFIBRA) 67 MG capsule TAKE ONE CAPSULE BY MOUTH EVERY MORNING BEFORE BREAKFAST 30 capsule 8     glucose 4 g CHEW Take 1 tablet by mouth every hour as needed for low blood sugar        insulin glargine (LANTUS PEN) 100 UNIT/ML pen Inject 66-68 Units Subcutaneous At Bedtime 75 mL 3     levothyroxine (SYNTHROID/LEVOTHROID) 112 MCG tablet Take 1 tablet (112 mcg) by mouth daily 90 tablet 3     nitroGLYcerin (NITROSTAT) 0.4 MG sublingual tablet Place 1 tablet (0.4 mg) under the tongue every 5 minutes as needed for chest pain 25 tablet 1     NOVOFINE AUTOCOVER PEN NEEDLE 30G X 8 MM miscellaneous USE AS DIRECTED FOUR TIMES A  each 2     NOVOLOG FLEXPEN 100 UNIT/ML soln INJECT SUBCUTANEOUSLY THREE TIMES A DAY (BEFORE MEALS). 3 UNITS PER CARB CHOICE (15 GM) APPROX 10-15 UNITS/ MEAL 45 mL 1     torsemide (DEMADEX) 10 MG tablet Take 2 tablets (20 mg) by mouth daily 180 tablet 3     levothyroxine (SYNTHROID/LEVOTHROID) 100 MCG tablet TAKE ONE TABLET BY MOUTH ONCE DAILY (Patient not taking: Reported on 10/19/2022) 90 tablet 3       ALLERGIES     Allergies   Allergen Reactions     Omeprazole      diarrhea     Pantoprazole      diarrhea       PAST MEDICAL HISTORY:  Past Medical History:   Diagnosis Date     Adhesive capsulitis of shoulder      Anemia 03/10/2014     Anemia, unspecified      Antiplatelet or antithrombotic long-term use      Arrhythmia     Atrial fib/flutter      CAD (coronary artery disease)      Gastroparesis     delayed emptying study May 2022     History of cardioversion 04/24/2018    a single synchronized shock of 120 joules restored normal sinus rhythm     History of cardioversion 02/13/2019    single shock , 200 joules successul in restoring NSR     Hyperlipidemia LDL goal <70 05/26/2011     Hypertension      Hypothyroidism      Impotence of organic origin      Laceration of finger 06/2010     left thumb s/p sutures     Numbness and tingling     diabetic neuropathy bilateral feet     BRANDON (obstructive sleep apnea)     intolerant of CPAP, uses it occasionally.  Using mandibular device occasionally     Osteopenia      Pulmonary hypertension (H) 04/06/2012     Sensorineural hearing loss, unspecified      Stented coronary artery 1997    CABG      Testosterone deficiency      Type 2 diabetes mellitus without complication  (goal A1C<8) 10/24/2015     Typical atrial flutter (H) 04/18/2016     Unspecified hypothyroidism      Vitamin D deficiency 09/03/2011       PAST SURGICAL HISTORY:  Past Surgical History:   Procedure Laterality Date     ANESTHESIA CARDIOVERSION N/A 02/13/2019    Procedure: ANESTHESIA CARDIOVERSION;  Surgeon: GENERIC ANESTHESIA PROVIDER;  Location:  OR     ANESTHESIA CARDIOVERSION N/A 05/22/2019    Procedure: ANESTHESIA, FOR CARDIOVERSION;  Surgeon: GENERIC ANESTHESIA PROVIDER;  Location:  OR     CARDIAC SURGERY      bypass in 1997     CARDIOVERSION  04/24/2018     COLONOSCOPY       CORONARY ANGIOGRAPHY ADULT ORDER      4/2/2012     CORONARY ARTERY BYPASS  1997    [facility]     ENDOSCOPIC ULTRASOUND UPPER GASTROINTESTINAL TRACT (GI) N/A 03/14/2019    Procedure: ENDOSCOPIC ULTRASOUND OF UPPER GASTROINTESTINAL TRACT;  Surgeon: Ju Torres MD;  Location:  OR     ESOPHAGOSCOPY, GASTROSCOPY, DUODENOSCOPY (EGD), COMBINED N/A 4/15/2022    Procedure: ESOPHAGOGASTRODUODENOSCOPY (EGD);  Surgeon: Erik Baca DO;   Location:  GI     EXCISE MASS HEAD Left 2016    Procedure: EXCISE MASS HEAD;  Surgeon: Ivan Hernandez MD;  Location: [facility]     HEART CATH, ANGIOPLASTY  2012     LAPAROSCOPIC CHOLECYSTECTOMY N/A 2019    Procedure: LAPAROSCOPIC CHOLECYSTECTOMY;  Surgeon: Janel Paz MD;  Location:  OR     PHACOEMULSIFICATION CLEAR CORNEA WITH STANDARD INTRAOCULAR LENS IMPLANT Left 2015    Procedure: PHACOEMULSIFICATION CLEAR CORNEA WITH STANDARD INTRAOCULAR LENS IMPLANT;  Surgeon: Nixon Farnsworth MD;  Location:  EC     PHACOEMULSIFICATION CLEAR CORNEA WITH STANDARD INTRAOCULAR LENS IMPLANT Right 2015    Procedure: PHACOEMULSIFICATION CLEAR CORNEA WITH STANDARD INTRAOCULAR LENS IMPLANT;  Surgeon: Nixon Farnsworth MD;  Location:  EC     SEPTOPLASTY, TURBINOPLASTY, COMBINED Bilateral 2016    Procedure: COMBINED SEPTOPLASTY, TURBINOPLASTY;  Surgeon: Ivan Hernandez MD;  Location: [facility]     ZZC NONSPECIFIC PROCEDURE      CABG (Yazidism)     ZC NONSPECIFIC PROCEDURE  2001    stress echo       FAMILY HISTORY:  Family History   Problem Relation Age of Onset     Genitourinary Problems Father         d: age 89; ARF     Hypertension Father      Diabetes Mother         d: age 68, CAD     Heart Disease Mother         MI     Myocardial Infarction Mother      Cardiovascular Brother         d:  age 31; CAD     Heart Disease Brother         age 31, MI     Diabetes Sister         b:  1939; DM2     Diabetes Sister        SOCIAL HISTORY:  Social History     Socioeconomic History     Marital status:      Spouse name: None     Number of children: None     Years of education: None     Highest education level: None   Tobacco Use     Smoking status: Former     Packs/day: 1.00     Years: 6.00     Pack years: 6.00     Types: Cigarettes, Cigars, Pipe     Start date:      Quit date: 1964     Years since quittin.3     Smokeless tobacco: Never   Substance and Sexual  "Activity     Alcohol use: Not Currently     Comment: couple drinks a year     Drug use: No     Sexual activity: Yes     Partners: Female     Birth control/protection: Abstinence   Other Topics Concern     Parent/sibling w/ CABG, MI or angioplasty before 65F 55M? No     Caffeine Concern Yes     Comment: 2 cups coffee a day     Sleep Concern Yes     Comment: sleep apnea, wears cpap off and on     Stress Concern No     Weight Concern No     Special Diet Yes     Comment: diabetic diet      Exercise No     Comment: occ walk the mall     Seat Belt Yes       Review of Systems:  Skin:          Eyes:         ENT:         Respiratory:  Positive for   PEREZ with stairs   Cardiovascular:  Negative;palpitations;dizziness;syncope or near-syncope;lightheadedness;cyanosis no exertional chest pain.    Gastroenterology:        Genitourinary:         Musculoskeletal:         Neurologic:         Psychiatric:         Heme/Lymph/Imm:         Endocrine:  Positive for thyroid disorder;diabetes      Physical Exam:  Vitals: /76   Pulse 78   Ht 1.803 m (5' 11\")   Wt 94.8 kg (209 lb)   BMI 29.15 kg/m      General patient appears comfortable  Neck normal JVP, negative hepatojugular reflux  Cardiovascular system slightly irregular, grade 2 x 6 ejection systolic murmur over the right upper sternal border, no S3-S4 rub or gallop  Respite system clear to auscultation  Extremities no edema      DYAN Adams MD  [address]  [place] [id number]                  "

## 2022-10-28 DIAGNOSIS — E11.9 TYPE 2 DIABETES MELLITUS WITHOUT COMPLICATION, WITH LONG-TERM CURRENT USE OF INSULIN (H): ICD-10-CM

## 2022-10-28 DIAGNOSIS — Z79.4 TYPE 2 DIABETES MELLITUS WITHOUT COMPLICATION, WITH LONG-TERM CURRENT USE OF INSULIN (H): ICD-10-CM

## 2022-10-28 NOTE — TELEPHONE ENCOUNTER
Reason for Call:  Medication or medication refill:    Do you use a [facility]?  Name of the pharmacy and phone number for the current request: [facility] - [place], [place] - [address]  [phone]    Name of the medication requested: insulin glargine (LANTUS PEN) 100 UNIT/ML pen    Other request: will be out of insulin tomorrow and pharmacy is closed on Sunday    Can we leave a detailed message on this number? YES    Phone number patient can be reached at: Home number on file [phone] (home)    Best Time: anytime    Call taken on 10/28/2022 at 3:02 PM by Yvette Montgomery

## 2022-10-29 ENCOUNTER — MYC MEDICAL ADVICE (OUTPATIENT)
Dept: INTERNAL MEDICINE | Facility: CLINIC | Age: 82
End: 2022-10-29

## 2022-10-29 DIAGNOSIS — E11.9 TYPE 2 DIABETES MELLITUS WITHOUT COMPLICATION, WITH LONG-TERM CURRENT USE OF INSULIN (H): ICD-10-CM

## 2022-10-29 DIAGNOSIS — Z79.4 TYPE 2 DIABETES MELLITUS WITHOUT COMPLICATION, WITH LONG-TERM CURRENT USE OF INSULIN (H): ICD-10-CM

## 2022-11-02 DIAGNOSIS — Z79.4 TYPE 2 DIABETES MELLITUS WITHOUT COMPLICATION, WITH LONG-TERM CURRENT USE OF INSULIN (H): ICD-10-CM

## 2022-11-02 DIAGNOSIS — E11.9 TYPE 2 DIABETES MELLITUS WITHOUT COMPLICATION, WITH LONG-TERM CURRENT USE OF INSULIN (H): ICD-10-CM

## 2022-11-04 RX ORDER — INSULIN ASPART 100 [IU]/ML
INJECTION, SOLUTION INTRAVENOUS; SUBCUTANEOUS
Qty: 45 ML | Refills: 0 | Status: SHIPPED | OUTPATIENT
Start: 2022-11-04 | End: 2023-03-20

## 2022-11-04 NOTE — TELEPHONE ENCOUNTER
Prescription approved per [facility] Refill Protocol.  Eric Price RN  [facility] Triage Nurse

## 2022-11-07 ENCOUNTER — LAB (OUTPATIENT)
Dept: LAB | Facility: CLINIC | Age: 82
End: 2022-11-07
Payer: COMMERCIAL

## 2022-11-07 DIAGNOSIS — E03.9 HYPOTHYROIDISM, UNSPECIFIED TYPE: ICD-10-CM

## 2022-11-07 LAB — TSH SERPL DL<=0.005 MIU/L-ACNC: 1.96 MU/L (ref 0.4–4)

## 2022-11-07 PROCEDURE — 36415 COLL VENOUS BLD VENIPUNCTURE: CPT

## 2022-11-07 PROCEDURE — 84443 ASSAY THYROID STIM HORMONE: CPT

## 2022-11-20 ENCOUNTER — HEALTH MAINTENANCE LETTER (OUTPATIENT)
Age: 82
End: 2022-11-20

## 2022-11-25 DIAGNOSIS — E78.5 HYPERLIPIDEMIA LDL GOAL <70: ICD-10-CM

## 2022-11-28 RX ORDER — ATORVASTATIN CALCIUM 40 MG/1
40 TABLET, FILM COATED ORAL DAILY
Qty: 90 TABLET | Refills: 1 | Status: SHIPPED | OUTPATIENT
Start: 2022-11-28 | End: 2023-05-31

## 2022-12-12 ENCOUNTER — TELEPHONE (OUTPATIENT)
Dept: CARDIOLOGY | Facility: CLINIC | Age: 82
End: 2022-12-12

## 2022-12-12 ENCOUNTER — HOSPITAL ENCOUNTER (OUTPATIENT)
Dept: CARDIOLOGY | Facility: CLINIC | Age: 82
Discharge: HOME | End: 2022-12-12
Attending: INTERNAL MEDICINE | Admitting: INTERNAL MEDICINE
Payer: COMMERCIAL

## 2022-12-12 ENCOUNTER — LAB (OUTPATIENT)
Dept: LAB | Facility: CLINIC | Age: 82
End: 2022-12-12
Payer: COMMERCIAL

## 2022-12-12 DIAGNOSIS — I50.30 HEART FAILURE WITH PRESERVED EJECTION FRACTION, NYHA CLASS II (H): ICD-10-CM

## 2022-12-12 LAB
ANION GAP SERPL CALCULATED.3IONS-SCNC: 6 MMOL/L (ref 3–14)
BUN SERPL-MCNC: 25 MG/DL (ref 7–30)
CALCIUM SERPL-MCNC: 10.4 MG/DL (ref 8.5–10.1)
CHLORIDE BLD-SCNC: 101 MMOL/L (ref 94–109)
CO2 SERPL-SCNC: 30 MMOL/L (ref 20–32)
CREAT SERPL-MCNC: 1.37 MG/DL (ref 0.66–1.25)
GFR SERPL CREATININE-BSD FRML MDRD: 52 ML/MIN/1.73M2
GLUCOSE BLD-MCNC: 219 MG/DL (ref 70–99)
LVEF ECHO: NORMAL
POTASSIUM BLD-SCNC: 4.3 MMOL/L (ref 3.4–5.3)
SODIUM SERPL-SCNC: 137 MMOL/L (ref 133–144)

## 2022-12-12 PROCEDURE — 36415 COLL VENOUS BLD VENIPUNCTURE: CPT | Performed by: INTERNAL MEDICINE

## 2022-12-12 PROCEDURE — 93306 TTE W/DOPPLER COMPLETE: CPT

## 2022-12-12 PROCEDURE — 80048 BASIC METABOLIC PNL TOTAL CA: CPT | Performed by: INTERNAL MEDICINE

## 2022-12-12 PROCEDURE — 93306 TTE W/DOPPLER COMPLETE: CPT | Mod: 26 | Performed by: INTERNAL MEDICINE

## 2022-12-12 NOTE — TELEPHONE ENCOUNTER
"Received results of the Echocardiogram from today.       1. The left ventricle is normal in structure, function and size. The visual  ejection fraction is estimated at 60%.  2. The right ventricle is normal size. The right ventricular systolic function  is mild to moderately reduced.  3. Mild valvular aortic stenosis. Mean 10mmHg, Vmax 2.2m/s.     Echo 8/2022 showed EF 60%, AS with mean 10mmHg, Vmax 2.2m/s.    OV 10\19\22 Dr. Adams wrote   \"Follow-up in 2 months with repeat echocardiogram which is already been scheduled.  If there is evidence of significant pulm hypertension despite treatment for sleep apnea on repeat echocardiogram will revisit the option of right heart catheterization with patient.\"    Writer has routed results to Dr. Adams for recommendations.    Krystyna Prabhakar RN on 12/12/2022 at 1:22 PM    "

## 2022-12-12 NOTE — TELEPHONE ENCOUNTER
Writer has placed call to [facility], and sent [email or web address] message with Dr. Adams response to review of today's Echocardiogram.    I had to [facility].  Invited [facility] to call us with questions\concerns.   He has upcoming apmnt on 12\15 with Dr. Adams.  I told him in the message that Dr. Adams will review all results at the appointment, and talk about next steps.    Krystyna Prabhakar RN on 12/12/2022 at 3:40 PM

## 2022-12-12 NOTE — TELEPHONE ENCOUNTER
RV systolic function appears better than previous study, even though PA pressure cannot be estimated from TR jet the pulmonary acceleration  time has increased and is in normal range indicating less likelihood of significant pulmonary hypertension.  I recommend follow-up in clinic as already scheduled later this week.    Thank you   [place]

## 2022-12-15 ENCOUNTER — OFFICE VISIT (OUTPATIENT)
Dept: CARDIOLOGY | Facility: CLINIC | Age: 82
End: 2022-12-15
Attending: INTERNAL MEDICINE
Payer: COMMERCIAL

## 2022-12-15 VITALS
DIASTOLIC BLOOD PRESSURE: 68 MMHG | BODY MASS INDEX: 29.4 KG/M2 | WEIGHT: 210 LBS | OXYGEN SATURATION: 98 % | HEART RATE: 78 BPM | HEIGHT: 71 IN | SYSTOLIC BLOOD PRESSURE: 122 MMHG

## 2022-12-15 DIAGNOSIS — I25.119 ATHEROSCLEROSIS OF NATIVE CORONARY ARTERY OF NATIVE HEART WITH ANGINA PECTORIS (H): ICD-10-CM

## 2022-12-15 DIAGNOSIS — I50.9 ACUTE ON CHRONIC CONGESTIVE HEART FAILURE, UNSPECIFIED HEART FAILURE TYPE (H): ICD-10-CM

## 2022-12-15 PROCEDURE — 99215 OFFICE O/P EST HI 40 MIN: CPT | Performed by: INTERNAL MEDICINE

## 2022-12-15 RX ORDER — NITROGLYCERIN 0.4 MG/1
0.4 TABLET SUBLINGUAL EVERY 5 MIN PRN
Qty: 25 TABLET | Refills: 3 | Status: SHIPPED | OUTPATIENT
Start: 2022-12-15

## 2022-12-15 NOTE — PROGRESS NOTES
HPI and Plan:   Mr Perea is a very pleasant 82-year-old gentleman with the history of CAD with history of CABG in 1997, atrial fibrillation flutter with history of cardioversion and patient was on amiodarone in the past but this was discontinued because of abnormal liver enzymes on apixaban for stroke prophylaxis recently dose decreased due to elevated creatinine,hospitalization for decompensated congestive heart failure in December 2021 in the setting of diuretics being discontinued a few months prior to admission due to hypercalcemia and patient was found to have congestive heart failure with moderately decreased RV systolic function with severe pulmonary hypertension, underwent diuresis and lost around 13 pounds of weight.  Patient also has history of sleep apnea diagnosed about 10 years ago and and started using CPAP since August 2022.  He also had a repeat echocardiogram done around the same time (August 2022) that showed LVEF of 60% with moderate LVH moderately dilated RV with moderately decreased RV systolic function with moderate pulm hypertension with RVSP of 48 mmHg plus RA with mild to moderate aortic stenosis.  To be noted echocardiogram done in December 2021 showed RVSP of 70 mg weekly plus RA.  He also had a Holter evaluation done that showed rhythm to be persistent atrial fibrillation with average ventricular rate of 80 bpm low of 61 high of 113.   Patient underwent cardiac MRI October 2022 that did not reveal any inducible ischemia.  Normal LV systolic function, mildly dilated RV with normal RV systolic function with mild flattening of interventricular septum possibly from RV volume and pressure overload.  Some non CAD scarring in the septum with RV and attachment regions which could be seen with pulmonary hypertension.  No evidence of infiltrative cardiomyopathy.  Patient was also seen in the ER around the same time because of weight gain of about 8 to 10 pounds with some shortness of breath and  shortness of breath with exertion.  He was diagnosed with some decompressed congestive heart failure in the ER and his torsemide was increased and did well after that and is now on 20 mg daily of torsemide.  Today patient is coming to the clinic.  He had a repeat echocardiogram, images were personally reviewed by me, echocardiogram showed normal LV function with LVEF of 60% mild to moderate his RV systolic function with normal RV size RVSP could not be estimated however pulmonary acceleration time was within normal limit.  Mild aortic valve stenosis.   Overall patient tells me he is doing quite well.  With using CPAP he is feeling much refreshed after sleeping and naps.  His breathing is stable.  Weights are stable around 200 to 202 pounds at home, no orthopnea or particular shortness of breath or any exertional chest discomfort or dizziness presyncope or syncope..    He does have chronic kidney disease stage III and liters BMP shows a some improvement in creatinine now 1.37 with normal sodium and potassium      Assessment plan  1.  History of hospitalization for congestive heart failure in December 2021.  Appeared  mostly right-sided congestive heart failure may be some element of preserved ejection fraction congestive heart failure.  Left-sided filling pressures on E to E prime ratio were in intermediate range on  echocardiogram during that hospitalization.  Evidence of moderately decreased RV systolic function.  Underwent 13 pound of diuresis.    Had done well since that time except visit to the ER in October 2022 for shortness of breath and dyspnea on exertion and about 10 pounds of weight gain.  Has responded well to escalation of diuretic with the back to baseline weight of 201 pounds at home and has been maintaining that weight quite well.  Appears euvolemic and compensated on examination today.,  On torsemide 20 mg daily.    No evidence of infiltrative cardiomyopathy on recent cardiac MRI.   2.  Severe  pulmonary hypertension.    Was initially noted in the setting of volume overload.  This was new finding on echocardiogram in December 2021.  To be noted he was on chronic anticoagulation so less likely to be PE or chronic thromboembolic disease.  Has untreated sleep apnea which could be a factor and since using the CPAP there is improvement in RVSP.  Latest echocardiogram RVSP could not be estimated however pulmonary exhalation time was within normal range on the recent study indicating less likelihood of significant pulmonary hypertension..  There also appears to be some improvement in RV systolic function and significant improvement in RV size.  I suspect untreated sleep apnea was playing a significant contribution to primary hypertension and resultant RV failure.  Currently does not appear in congestive heart failure either left or right-sided.  JVP appears normal, no pitting pedal edema.  4.  CAD with history of CABG in 1997.  Stress test in June 2021 showing small apical infarct.  Clinically no anginal symptoms.  On high intensity statin LDL well controlled.  Cardiac MRI stress perfusion in October 2022 with no evidence of inducible ischemia.  5.  Chronic atrial fibrillation flutter with, ventricular rates well controlled without any need for AV taylor blocking agents. Holter showed well-controlled ventricular rates with rhythm to be atrial fibrillation.  No dizziness presyncope syncope Was on amiodarone in the past but this was discontinued because of abnormal liver enzymes.  Chads 2 Vascor of at least 6.  On apixaban for stroke prophylaxis.    D  6.  Obstructive sleep apnea.    On CPAP with significant improvement in energy level while using CPAP and feeling more refreshed after sleep now.  7.  Mild aortic valve stenosis  9.  Chronic kidney disease stage III.       Recommendations  Discussed in detail with patient regarding recent echocardiogram.  RVSP could not be estimated although pulmonary acceleration  time was normal.  Also significant improvement in RV size and some improvement in RV systolic function.  Clinically he does not appear in congestive heart failure either right or left-sided.  Discussed option and rational of right heart catheterization.  However given that there is significant improvement in RV size also improvement in RV systolic function, patient would prefer to continue medical management.  I think this is reasonable.  Going forward if there is evidence of any decompensated right-sided congestive heart failure or evidence of increase PA systolic pressure on echocardiogram I would have a low threshold to recommend patient reconsider right heart catheterization  Follow-up in 9 months with an echocardiogram, sooner if he notes any change in clinical status.    Today's clinic visit entailed:  Review of the result(s) of each unique test - echo, BMP      The level of medical decision making during this visit was of high complexity.      Orders Placed This Encounter   Procedures     Follow-Up with Cardiology     Echocardiogram Complete       Orders Placed This Encounter   Medications     nitroGLYcerin (NITROSTAT) 0.4 MG sublingual tablet     Sig: Place 1 tablet (0.4 mg) under the tongue every 5 minutes as needed for chest pain     Dispense:  25 tablet     Refill:  3       Medications Discontinued During This Encounter   Medication Reason     nitroGLYcerin (NITROSTAT) 0.4 MG sublingual tablet Reorder         Encounter Diagnoses   Name Primary?     Acute on chronic congestive heart failure, unspecified heart failure type (H)      Atherosclerosis of native coronary artery of native heart with angina pectoris (H)        CURRENT MEDICATIONS:  Current Outpatient Medications   Medication Sig Dispense Refill     apixaban ANTICOAGULANT (ELIQUIS) 5 MG tablet Take 1 tablet (5 mg) by mouth 2 times daily 180 tablet 3     atorvastatin (LIPITOR) 40 MG tablet Take 1 tablet (40 mg) by mouth daily 90 tablet 1      azelastine (ASTELIN) 0.1 % nasal spray USE 2 SPRAYS INTO BOTH NOSTRILS TWO TIMES A DAY 90 mL 3     blood glucose (ACCU-CHEK NORMAN PLUS) test strip USE TO TEST BLOOD SUGAR 3 TIMES A DAY OR AS DIRECTED 100 strip 11     blood glucose calibration (ACCU-CHEK NORMAN) solution USE AS DIRECTED 1 each 0     Continuous Blood Gluc  (FREESTYLE DELFINA 2 READER) CHRYSTAL 1 Device continuous 1 each 1     Continuous Blood Gluc Sensor (FREESTYLE DELFINA 2 SENSOR) [facility] APPLY 1 DEVICE TO TEST BLOOD SUGAR, CHANGE EVERY 14 DAYS 6 each 3     fenofibrate micronized (LOFIBRA) 67 MG capsule TAKE ONE CAPSULE BY MOUTH EVERY MORNING BEFORE BREAKFAST 30 capsule 8     insulin aspart (NOVOLOG FLEXPEN) 100 UNIT/ML pen INJECT SUBCUTANEOUSLY THREE TIMES A DAY (BEFORE MEALS). 3 UNITS PER CARB CHOICE (15 GM) APPROX 10-15 UNITS/ MEAL Strength: 100 UNIT/ML 45 mL 0     insulin glargine (LANTUS PEN) 100 UNIT/ML pen Inject 66-68 Units Subcutaneous At Bedtime 75 mL 3     levothyroxine (SYNTHROID/LEVOTHROID) 112 MCG tablet Take 1 tablet (112 mcg) by mouth daily 90 tablet 3     nitroGLYcerin (NITROSTAT) 0.4 MG sublingual tablet Place 1 tablet (0.4 mg) under the tongue every 5 minutes as needed for chest pain 25 tablet 3     NOVOFINE AUTOCOVER PEN NEEDLE 30G X 8 MM miscellaneous USE AS DIRECTED FOUR TIMES A  each 2     torsemide (DEMADEX) 10 MG tablet Take 2 tablets (20 mg) by mouth daily 180 tablet 3     glucose 4 g CHEW Take 1 tablet by mouth every hour as needed for low blood sugar          ALLERGIES     Allergies   Allergen Reactions     Omeprazole      diarrhea     Pantoprazole      diarrhea       PAST MEDICAL HISTORY:  Past Medical History:   Diagnosis Date     Adhesive capsulitis of shoulder      Anemia 03/10/2014     Anemia, unspecified      Antiplatelet or antithrombotic long-term use      Arrhythmia     Atrial fib/flutter     CAD (coronary artery disease)      Gastroparesis     delayed emptying study May 2022     History of cardioversion  04/24/2018    a single synchronized shock of 120 joules restored normal sinus rhythm     History of cardioversion 02/13/2019    single shock , 200 joules successul in restoring NSR     Hyperlipidemia LDL goal <70 05/26/2011     Hypertension      Hypothyroidism      Impotence of organic origin      Laceration of finger 06/2010     left thumb s/p sutures     Numbness and tingling     diabetic neuropathy bilateral feet     BRANDON (obstructive sleep apnea)     intolerant of CPAP, uses it occasionally.  Using mandibular device occasionally     Osteopenia      Pulmonary hypertension (H) 04/06/2012     Sensorineural hearing loss, unspecified      Stented coronary artery 1997    CABG      Testosterone deficiency      Type 2 diabetes mellitus without complication  (goal A1C<8) 10/24/2015     Typical atrial flutter (H) 04/18/2016     Unspecified hypothyroidism      Vitamin D deficiency 09/03/2011       PAST SURGICAL HISTORY:  Past Surgical History:   Procedure Laterality Date     ANESTHESIA CARDIOVERSION N/A 02/13/2019    Procedure: ANESTHESIA CARDIOVERSION;  Surgeon: GENERIC ANESTHESIA PROVIDER;  Location:  OR     ANESTHESIA CARDIOVERSION N/A 05/22/2019    Procedure: ANESTHESIA, FOR CARDIOVERSION;  Surgeon: GENERIC ANESTHESIA PROVIDER;  Location:  OR     CARDIAC SURGERY      bypass in 1997     CARDIOVERSION  04/24/2018     COLONOSCOPY       CORONARY ANGIOGRAPHY ADULT ORDER      4/2/2012     CORONARY ARTERY BYPASS  1997    [facility]     ENDOSCOPIC ULTRASOUND UPPER GASTROINTESTINAL TRACT (GI) N/A 03/14/2019    Procedure: ENDOSCOPIC ULTRASOUND OF UPPER GASTROINTESTINAL TRACT;  Surgeon: Ju Torres MD;  Location:  OR     ESOPHAGOSCOPY, GASTROSCOPY, DUODENOSCOPY (EGD), COMBINED N/A 4/15/2022    Procedure: ESOPHAGOGASTRODUODENOSCOPY (EGD);  Surgeon: Erik Baca DO;  Location:  GI     EXCISE MASS HEAD Left 08/18/2016    Procedure: EXCISE MASS HEAD;  Surgeon: Ivan Hernandez MD;   Location: [facility]     HEART CATH, ANGIOPLASTY  2012     LAPAROSCOPIC CHOLECYSTECTOMY N/A 2019    Procedure: LAPAROSCOPIC CHOLECYSTECTOMY;  Surgeon: Janel Paz MD;  Location:  OR     PHACOEMULSIFICATION CLEAR CORNEA WITH STANDARD INTRAOCULAR LENS IMPLANT Left 2015    Procedure: PHACOEMULSIFICATION CLEAR CORNEA WITH STANDARD INTRAOCULAR LENS IMPLANT;  Surgeon: Nixon Farnsworth MD;  Location:  [facility]     PHACOEMULSIFICATION CLEAR CORNEA WITH STANDARD INTRAOCULAR LENS IMPLANT Right 2015    Procedure: PHACOEMULSIFICATION CLEAR CORNEA WITH STANDARD INTRAOCULAR LENS IMPLANT;  Surgeon: Nixon Farnsworth MD;  Location:  [facility]     SEPTOPLASTY, TURBINOPLASTY, COMBINED Bilateral 2016    Procedure: COMBINED SEPTOPLASTY, TURBINOPLASTY;  Surgeon: Ivan Hernandez MD;  Location:  [facility]     ZZC NONSPECIFIC PROCEDURE      CABG ([facility])     Z NONSPECIFIC PROCEDURE  2001    stress echo       FAMILY HISTORY:  Family History   Problem Relation Age of Onset     Genitourinary Problems Father         d: age 89; ARF     Hypertension Father      Diabetes Mother         d: age 68, CAD     Heart Disease Mother         MI     Myocardial Infarction Mother      Cardiovascular Brother         d:  age 31; CAD     Heart Disease Brother         age 31, MI     Diabetes Sister         b:  1939; DM2     Diabetes Sister        SOCIAL HISTORY:  Social History     Socioeconomic History     Marital status:    Tobacco Use     Smoking status: Former     Packs/day: 1.00     Years: 6.00     Pack years: 6.00     Types: Cigarettes, Cigars, Pipe     Start date:      Quit date: 1964     Years since quittin.4     Smokeless tobacco: Never   Substance and Sexual Activity     Alcohol use: Not Currently     Comment: couple drinks a year     Drug use: No     Sexual activity: Yes     Partners: Female     Birth control/protection: Abstinence   Other Topics Concern     Parent/sibling w/ CABG, MI or  "angioplasty before 65F 55M? No     Caffeine Concern Yes     Comment: 2 cups coffee a day     Sleep Concern Yes     Comment: sleep apnea, wears cpap off and on     Stress Concern No     Weight Concern No     Special Diet Yes     Comment: diabetic diet      Exercise No     Comment: occ walk the mall     Seat Belt Yes       Review of Systems:  Skin:          Eyes:         ENT:         Respiratory:          Cardiovascular:         Gastroenterology:        Genitourinary:         Musculoskeletal:         Neurologic:         Psychiatric:         Heme/Lymph/Imm:         Endocrine:           Physical Exam:  Vitals: /68   Pulse 78   Ht 1.803 m (5' 11\")   Wt 95.3 kg (210 lb)   SpO2 98%   BMI 29.29 kg/m      General patient appears comfortable  Neck normal JVP, negative hepatojugular reflux  Cardiovascular system irregular, normal rate, grade 2 x 6 ejection systolic murmur over the right upper sternal border, no S3-S4 rub or gallop  Respiratory system clear to auscultation  Extremities no significant edema  Neurological alert, oriented    DYAN Adams MD  [id number]  [place] [id number]              "

## 2022-12-15 NOTE — LETTER
12/15/2022    Andrew Elizalde MD  [address] [facility] [address]    RE: Pascual Esteswrocki       Dear Colleague,     I had the pleasure of seeing Pascual Perea in the [facility].  HPI and Plan:   Mr Perea is a very pleasant 82-year-old gentleman with the history of CAD with history of CABG in 1997, atrial fibrillation flutter with history of cardioversion and patient was on amiodarone in the past but this was discontinued because of abnormal liver enzymes on apixaban for stroke prophylaxis recently dose decreased due to elevated creatinine,hospitalization for decompensated congestive heart failure in December 2021 in the setting of diuretics being discontinued a few months prior to admission due to hypercalcemia and patient was found to have congestive heart failure with moderately decreased RV systolic function with severe pulmonary hypertension, underwent diuresis and lost around 13 pounds of weight.  Patient also has history of sleep apnea diagnosed about 10 years ago and and started using CPAP since August 2022.  He also had a repeat echocardiogram done around the same time (August 2022) that showed LVEF of 60% with moderate LVH moderately dilated RV with moderately decreased RV systolic function with moderate pulm hypertension with RVSP of 48 mmHg plus RA with mild to moderate aortic stenosis.  To be noted echocardiogram done in December 2021 showed RVSP of 70 mg weekly plus RA.  He also had a Holter evaluation done that showed rhythm to be persistent atrial fibrillation with average ventricular rate of 80 bpm low of 61 high of 113.   Patient underwent cardiac MRI October 2022 that did not reveal any inducible ischemia.  Normal LV systolic function, mildly dilated RV with normal RV systolic function with mild flattening of interventricular septum possibly from RV volume and pressure overload.  Some non CAD scarring in the septum with RV and attachment regions which could be seen with  pulmonary hypertension.  No evidence of infiltrative cardiomyopathy.  Patient was also seen in the ER around the same time because of weight gain of about 8 to 10 pounds with some shortness of breath and shortness of breath with exertion.  He was diagnosed with some decompressed congestive heart failure in the ER and his torsemide was increased and did well after that and is now on 20 mg daily of torsemide.  Today patient is coming to the clinic.  He had a repeat echocardiogram, images were personally reviewed by me, echocardiogram showed normal LV function with LVEF of 60% mild to moderate his RV systolic function with normal RV size RVSP could not be estimated however pulmonary acceleration time was within normal limit.  Mild aortic valve stenosis.   Overall patient tells me he is doing quite well.  With using CPAP he is feeling much refreshed after sleeping and naps.  His breathing is stable.  Weights are stable around 200 to 202 pounds at home, no orthopnea or particular shortness of breath or any exertional chest discomfort or dizziness presyncope or syncope..    He does have chronic kidney disease stage III and liters BMP shows a some improvement in creatinine now 1.37 with normal sodium and potassium      Assessment plan  1.  History of hospitalization for congestive heart failure in December 2021.  Appeared  mostly right-sided congestive heart failure may be some element of preserved ejection fraction congestive heart failure.  Left-sided filling pressures on E to E prime ratio were in intermediate range on  echocardiogram during that hospitalization.  Evidence of moderately decreased RV systolic function.  Underwent 13 pound of diuresis.    Had done well since that time except visit to the ER in October 2022 for shortness of breath and dyspnea on exertion and about 10 pounds of weight gain.  Has responded well to escalation of diuretic with the back to baseline weight of 201 pounds at home and has been  maintaining that weight quite well.  Appears euvolemic and compensated on examination today.,  On torsemide 20 mg daily.    No evidence of infiltrative cardiomyopathy on recent cardiac MRI.   2.  Severe pulmonary hypertension.    Was initially noted in the setting of volume overload.  This was new finding on echocardiogram in December 2021.  To be noted he was on chronic anticoagulation so less likely to be PE or chronic thromboembolic disease.  Has untreated sleep apnea which could be a factor and since using the CPAP there is improvement in RVSP.  Latest echocardiogram RVSP could not be estimated however pulmonary exhalation time was within normal range on the recent study indicating less likelihood of significant pulmonary hypertension..  There also appears to be some improvement in RV systolic function and significant improvement in RV size.  I suspect untreated sleep apnea was playing a significant contribution to primary hypertension and resultant RV failure.  Currently does not appear in congestive heart failure either left or right-sided.  JVP appears normal, no pitting pedal edema.  4.  CAD with history of CABG in 1997.  Stress test in June 2021 showing small apical infarct.  Clinically no anginal symptoms.  On high intensity statin LDL well controlled.  Cardiac MRI stress perfusion in October 2022 with no evidence of inducible ischemia.  5.  Chronic atrial fibrillation flutter with, ventricular rates well controlled without any need for AV taylor blocking agents. Holter showed well-controlled ventricular rates with rhythm to be atrial fibrillation.  No dizziness presyncope syncope Was on amiodarone in the past but this was discontinued because of abnormal liver enzymes.  Chads 2 Vascor of at least 6.  On apixaban for stroke prophylaxis.    D  6.  Obstructive sleep apnea.    On CPAP with significant improvement in energy level while using CPAP and feeling more refreshed after sleep now.  7.  Mild aortic  valve stenosis  9.  Chronic kidney disease stage III.       Recommendations  Discussed in detail with patient regarding recent echocardiogram.  RVSP could not be estimated although pulmonary acceleration time was normal.  Also significant improvement in RV size and some improvement in RV systolic function.  Clinically he does not appear in congestive heart failure either right or left-sided.  Discussed option and rational of right heart catheterization.  However given that there is significant improvement in RV size also improvement in RV systolic function, patient would prefer to continue medical management.  I think this is reasonable.  Going forward if there is evidence of any decompensated right-sided congestive heart failure or evidence of increase PA systolic pressure on echocardiogram I would have a low threshold to recommend patient reconsider right heart catheterization  Follow-up in 9 months with an echocardiogram, sooner if he notes any change in clinical status.    Today's clinic visit entailed:  Review of the result(s) of each unique test - echo, BMP      The level of medical decision making during this visit was of high complexity.      Orders Placed This Encounter   Procedures     Follow-Up with Cardiology     Echocardiogram Complete       Orders Placed This Encounter   Medications     nitroGLYcerin (NITROSTAT) 0.4 MG sublingual tablet     Sig: Place 1 tablet (0.4 mg) under the tongue every 5 minutes as needed for chest pain     Dispense:  25 tablet     Refill:  3       Medications Discontinued During This Encounter   Medication Reason     nitroGLYcerin (NITROSTAT) 0.4 MG sublingual tablet Reorder         Encounter Diagnoses   Name Primary?     Acute on chronic congestive heart failure, unspecified heart failure type (H)      Atherosclerosis of native coronary artery of native heart with angina pectoris (H)        CURRENT MEDICATIONS:  Current Outpatient Medications   Medication Sig Dispense Refill      apixaban ANTICOAGULANT (ELIQUIS) 5 MG tablet Take 1 tablet (5 mg) by mouth 2 times daily 180 tablet 3     atorvastatin (LIPITOR) 40 MG tablet Take 1 tablet (40 mg) by mouth daily 90 tablet 1     azelastine (ASTELIN) 0.1 % nasal spray USE 2 SPRAYS INTO BOTH NOSTRILS TWO TIMES A DAY 90 mL 3     blood glucose (ACCU-CHEK NORMAN PLUS) test strip USE TO TEST BLOOD SUGAR 3 TIMES A DAY OR AS DIRECTED 100 strip 11     blood glucose calibration (ACCU-CHEK NORMAN) solution USE AS DIRECTED 1 each 0     Continuous Blood Gluc  (FREESTYLE DELFINA 2 READER) CHRYSTAL 1 Device continuous 1 each 1     Continuous Blood Gluc Sensor (FREESTYLE DELFINA 2 SENSOR) [facility] APPLY 1 DEVICE TO TEST BLOOD SUGAR, CHANGE EVERY 14 DAYS 6 each 3     fenofibrate micronized (LOFIBRA) 67 MG capsule TAKE ONE CAPSULE BY MOUTH EVERY MORNING BEFORE BREAKFAST 30 capsule 8     insulin aspart (NOVOLOG FLEXPEN) 100 UNIT/ML pen INJECT SUBCUTANEOUSLY THREE TIMES A DAY (BEFORE MEALS). 3 UNITS PER CARB CHOICE (15 GM) APPROX 10-15 UNITS/ MEAL Strength: 100 UNIT/ML 45 mL 0     insulin glargine (LANTUS PEN) 100 UNIT/ML pen Inject 66-68 Units Subcutaneous At Bedtime 75 mL 3     levothyroxine (SYNTHROID/LEVOTHROID) 112 MCG tablet Take 1 tablet (112 mcg) by mouth daily 90 tablet 3     nitroGLYcerin (NITROSTAT) 0.4 MG sublingual tablet Place 1 tablet (0.4 mg) under the tongue every 5 minutes as needed for chest pain 25 tablet 3     NOVOFINE AUTOCOVER PEN NEEDLE 30G X 8 MM miscellaneous USE AS DIRECTED FOUR TIMES A  each 2     torsemide (DEMADEX) 10 MG tablet Take 2 tablets (20 mg) by mouth daily 180 tablet 3     glucose 4 g CHEW Take 1 tablet by mouth every hour as needed for low blood sugar          ALLERGIES     Allergies   Allergen Reactions     Omeprazole      diarrhea     Pantoprazole      diarrhea       PAST MEDICAL HISTORY:  Past Medical History:   Diagnosis Date     Adhesive capsulitis of shoulder      Anemia 03/10/2014     Anemia, unspecified       Antiplatelet or antithrombotic long-term use      Arrhythmia     Atrial fib/flutter     CAD (coronary artery disease)      Gastroparesis     delayed emptying study May 2022     History of cardioversion 04/24/2018    a single synchronized shock of 120 joules restored normal sinus rhythm     History of cardioversion 02/13/2019    single shock , 200 joules successul in restoring NSR     Hyperlipidemia LDL goal <70 05/26/2011     Hypertension      Hypothyroidism      Impotence of organic origin      Laceration of finger 06/2010     left thumb s/p sutures     Numbness and tingling     diabetic neuropathy bilateral feet     BRANDON (obstructive sleep apnea)     intolerant of CPAP, uses it occasionally.  Using mandibular device occasionally     Osteopenia      Pulmonary hypertension (H) 04/06/2012     Sensorineural hearing loss, unspecified      Stented coronary artery 1997    CABG      Testosterone deficiency      Type 2 diabetes mellitus without complication  (goal A1C<8) 10/24/2015     Typical atrial flutter (H) 04/18/2016     Unspecified hypothyroidism      Vitamin D deficiency 09/03/2011       PAST SURGICAL HISTORY:  Past Surgical History:   Procedure Laterality Date     ANESTHESIA CARDIOVERSION N/A 02/13/2019    Procedure: ANESTHESIA CARDIOVERSION;  Surgeon: GENERIC ANESTHESIA PROVIDER;  Location:  OR     ANESTHESIA CARDIOVERSION N/A 05/22/2019    Procedure: ANESTHESIA, FOR CARDIOVERSION;  Surgeon: GENERIC ANESTHESIA PROVIDER;  Location:  OR     CARDIAC SURGERY      bypass in 1997     CARDIOVERSION  04/24/2018     COLONOSCOPY       CORONARY ANGIOGRAPHY ADULT ORDER      4/2/2012     CORONARY ARTERY BYPASS  1997    [facility]     ENDOSCOPIC ULTRASOUND UPPER GASTROINTESTINAL TRACT (GI) N/A 03/14/2019    Procedure: ENDOSCOPIC ULTRASOUND OF UPPER GASTROINTESTINAL TRACT;  Surgeon: Ju Torres MD;  Location:  OR     ESOPHAGOSCOPY, GASTROSCOPY, DUODENOSCOPY (EGD), COMBINED N/A 4/15/2022     Procedure: ESOPHAGOGASTRODUODENOSCOPY (EGD);  Surgeon: Erik Baca DO;  Location:  GI     EXCISE MASS HEAD Left 2016    Procedure: EXCISE MASS HEAD;  Surgeon: Ivan Hernandez MD;  Location: [facility]     HEART CATH, ANGIOPLASTY  2012     LAPAROSCOPIC CHOLECYSTECTOMY N/A 2019    Procedure: LAPAROSCOPIC CHOLECYSTECTOMY;  Surgeon: Janel Paz MD;  Location:  OR     PHACOEMULSIFICATION CLEAR CORNEA WITH STANDARD INTRAOCULAR LENS IMPLANT Left 2015    Procedure: PHACOEMULSIFICATION CLEAR CORNEA WITH STANDARD INTRAOCULAR LENS IMPLANT;  Surgeon: Nixon Farnsworth MD;  Location:  EC     PHACOEMULSIFICATION CLEAR CORNEA WITH STANDARD INTRAOCULAR LENS IMPLANT Right 2015    Procedure: PHACOEMULSIFICATION CLEAR CORNEA WITH STANDARD INTRAOCULAR LENS IMPLANT;  Surgeon: Nixon Farnsworth MD;  Location:  EC     SEPTOPLASTY, TURBINOPLASTY, COMBINED Bilateral 2016    Procedure: COMBINED SEPTOPLASTY, TURBINOPLASTY;  Surgeon: Ivan Hernandez MD;  Location: [facility]     Z NONSPECIFIC PROCEDURE      CABG ([facility])     [facility] NONSPECIFIC PROCEDURE  2001    stress echo       FAMILY HISTORY:  Family History   Problem Relation Age of Onset     Genitourinary Problems Father         d: age 89; ARF     Hypertension Father      Diabetes Mother         d: age 68, CAD     Heart Disease Mother         MI     Myocardial Infarction Mother      Cardiovascular Brother         d:  age 31; CAD     Heart Disease Brother         age 31, MI     Diabetes Sister         b:  1939; DM2     Diabetes Sister        SOCIAL HISTORY:  Social History     Socioeconomic History     Marital status:    Tobacco Use     Smoking status: Former     Packs/day: 1.00     Years: 6.00     Pack years: 6.00     Types: Cigarettes, Cigars, Pipe     Start date:      Quit date: 1964     Years since quittin.4     Smokeless tobacco: Never   Substance and Sexual Activity     Alcohol use: Not  "Currently     Comment: couple drinks a year     Drug use: No     Sexual activity: Yes     Partners: Female     Birth control/protection: Abstinence   Other Topics Concern     Parent/sibling w/ CABG, MI or angioplasty before 65F 55M? No     Caffeine Concern Yes     Comment: 2 cups coffee a day     Sleep Concern Yes     Comment: sleep apnea, wears cpap off and on     Stress Concern No     Weight Concern No     Special Diet Yes     Comment: diabetic diet      Exercise No     Comment: occ walk the mall     Seat Belt Yes       Review of Systems:  Skin:          Eyes:         ENT:         Respiratory:          Cardiovascular:         Gastroenterology:        Genitourinary:         Musculoskeletal:         Neurologic:         Psychiatric:         Heme/Lymph/Imm:         Endocrine:           Physical Exam:  Vitals: /68   Pulse 78   Ht 1.803 m (5' 11\")   Wt 95.3 kg (210 lb)   SpO2 98%   BMI 29.29 kg/m      General patient appears comfortable  Neck normal JVP, negative hepatojugular reflux  Cardiovascular system irregular, normal rate, grade 2 x 6 ejection systolic murmur over the right upper sternal border, no S3-S4 rub or gallop  Respiratory system clear to auscultation  Extremities no significant edema  Neurological alert, oriented    CC  Phoenix Adams MD  [address] [facility] [address]  [place] [address]    Thank you for allowing me to participate in the care of your patient.      Sincerely,     Phoenix Adams MD     [facility]  "

## 2023-03-08 DIAGNOSIS — Z79.4 TYPE 2 DIABETES MELLITUS WITHOUT COMPLICATION, WITH LONG-TERM CURRENT USE OF INSULIN (H): ICD-10-CM

## 2023-03-08 DIAGNOSIS — E11.9 TYPE 2 DIABETES MELLITUS WITHOUT COMPLICATION, WITH LONG-TERM CURRENT USE OF INSULIN (H): ICD-10-CM

## 2023-03-16 DIAGNOSIS — E78.5 HYPERLIPIDEMIA LDL GOAL <70: ICD-10-CM

## 2023-03-17 RX ORDER — FENOFIBRATE 67 MG/1
CAPSULE ORAL
Qty: 90 CAPSULE | Refills: 0 | Status: SHIPPED | OUTPATIENT
Start: 2023-03-17 | End: 2023-06-27

## 2023-03-20 DIAGNOSIS — Z79.4 TYPE 2 DIABETES MELLITUS WITHOUT COMPLICATION, WITH LONG-TERM CURRENT USE OF INSULIN (H): ICD-10-CM

## 2023-03-20 DIAGNOSIS — E11.9 TYPE 2 DIABETES MELLITUS WITHOUT COMPLICATION, WITH LONG-TERM CURRENT USE OF INSULIN (H): ICD-10-CM

## 2023-03-20 DIAGNOSIS — N18.32 STAGE 3B CHRONIC KIDNEY DISEASE (H): Primary | ICD-10-CM

## 2023-03-20 RX ORDER — INSULIN ASPART 100 [IU]/ML
INJECTION, SOLUTION INTRAVENOUS; SUBCUTANEOUS
Qty: 45 ML | Refills: 3 | Status: SHIPPED | OUTPATIENT
Start: 2023-03-20 | End: 2024-03-05

## 2023-03-20 NOTE — TELEPHONE ENCOUNTER
Patient intermittently reading [employer] messages  Insulin refill approved.  Patient due for nonfasting labs in the next 1 to 2 weeks to follow-up on diabetes and kidney function.  Future labs ordered.  Assist patient in scheduling a nonfasting lab appointment

## 2023-04-15 ENCOUNTER — OFFICE VISIT (OUTPATIENT)
Dept: URGENT CARE | Facility: URGENT CARE | Age: 83
End: 2023-04-15
Payer: COMMERCIAL

## 2023-04-15 ENCOUNTER — ANCILLARY PROCEDURE (OUTPATIENT)
Dept: GENERAL RADIOLOGY | Facility: CLINIC | Age: 83
End: 2023-04-15
Attending: NURSE PRACTITIONER
Payer: COMMERCIAL

## 2023-04-15 ENCOUNTER — HEALTH MAINTENANCE LETTER (OUTPATIENT)
Age: 83
End: 2023-04-15

## 2023-04-15 VITALS
SYSTOLIC BLOOD PRESSURE: 130 MMHG | DIASTOLIC BLOOD PRESSURE: 60 MMHG | RESPIRATION RATE: 20 BRPM | BODY MASS INDEX: 28.45 KG/M2 | TEMPERATURE: 97.7 F | WEIGHT: 204 LBS | HEART RATE: 61 BPM | OXYGEN SATURATION: 97 %

## 2023-04-15 DIAGNOSIS — M54.50 ACUTE RIGHT-SIDED LOW BACK PAIN WITHOUT SCIATICA: ICD-10-CM

## 2023-04-15 DIAGNOSIS — M85.80 OSTEOPENIA, UNSPECIFIED LOCATION: ICD-10-CM
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
## 2023-04-15 PROBLEM — K76.0 STEATOSIS OF LIVER: Status: ACTIVE | Noted: 2023-04-15

## 2023-04-15 PROCEDURE — 99213 OFFICE O/P EST LOW 20 MIN: CPT | Performed by: NURSE PRACTITIONER

## 2023-04-15 PROCEDURE — 72100 X-RAY EXAM L-S SPINE 2/3 VWS: CPT | Mod: TC | Performed by: RADIOLOGY

## 2023-04-15 RX ORDER — CYCLOBENZAPRINE HCL 5 MG
5 TABLET ORAL 2 TIMES DAILY PRN
Qty: 20 TABLET | Refills: 0 | Status: SHIPPED | OUTPATIENT
Start: 2023-04-15 | End: 2023-04-25

## 2023-04-15 NOTE — PROGRESS NOTES
Chief Complaint   Patient presents with     Musculoskeletal Problem     Low back pain since falling 1.5 weeks when carrying groceries         ICD-10-CM    1. Fall, initial encounter  W19.XXXA XR Lumbar Spine 2/3 Views      2. Acute right-sided low back pain without sciatica  M54.50 XR Lumbar Spine 2/3 Views     Spine  Referral     cyclobenzaprine (FLEXERIL) 5 MG tablet      3. Osteopenia, unspecified location  M85.80 XR Lumbar Spine 2/3 Views     Spine  Referral      Advised patient of maximal amounts of acetaminophen he may take for comfort.  I also instructed him to use ice and/or heat as needed.  Discussed risks and benefits of cyclobenzaprine.  He understands this may increase his fall risk and make him drowsy so we will use extra caution while taking this medication.  Referral made to spine providers to further evaluate.  Patient may need CT or MRI of his spine if symptoms persist.      Xray - Reviewed and interpreted by me.  Lumbar spine x-ray shows no acute fractures.    No results found. However, due to the size of the patient record, not all encounters were searched. Please check Results Review for a complete set of results.    Subjective     Pascual Perea is an 82 year old male who presents to clinic today for low back pain that started 2 days after he had a fall where he fell backwards and landed on his butt.  This occurred about 10 days ago and symptoms have not been improving.  He has occasionally taken 1 Tylenol and this has provided relief.  He denies any numbness, tingling, saddle numbness, loss of bowel or bladder control.      ROS: 10 point ROS neg other than the symptoms noted above in the HPI.       Objective    /60   Pulse 61   Temp 97.7  F (36.5  C)   Resp 20   Wt 92.5 kg (204 lb)   SpO2 97%   BMI 28.45 kg/m    Nurses notes and VS have been reviewed.    Physical Exam       GENERAL APPEARANCE: alert and moderate distress     EYES: PERRL, EOMI, sclera  non-icteric     HENT: oral exam benign, mucus membranes intact, without ulcers or lesions     NECK: no adenopathy or asymmetry, thyroid normal to palpation     RESP: lungs clear to auscultation - no rales, rhonchi or wheezes     CV: regular rates and rhythm, no murmurs, rubs, or gallop     MS: extremities normal- no gross deformities noted; normal muscle tone.  Positive tenderness over the right side musculature of the lower back, positive straight leg raises bilaterally.  Worse on the right     SKIN: no suspicious lesions or rashes     NEURO: Normal strength and tone, mentation intact and speech normal, deep tendon reflexes are intact     PSYCH: normal thought process; no significant mood disturbance      JAKI Coronel, CNP  [facility] Provider    The use of Dragon/Covario dictation services may have been used to construct the content in this note; any grammatical or spelling errors are non-intentional. Please contact the author of this note directly if you are in need of any clarification.

## 2023-04-15 NOTE — PATIENT INSTRUCTIONS
Acetaminophen 1000 mg every 8 hours as needed for pain.    Apply ice and/or heat to the area for comfort.    If symptoms persist beyond another week you may need CT scan or MRI of the spine for further evaluation.    Wait for call from the orthopedic clinic to schedule appointment.    Be extra cautious when taking cyclobenzaprine as this may cause drowsiness and increase your fall risk.

## 2023-04-17 ENCOUNTER — LAB (OUTPATIENT)
Dept: LAB | Facility: CLINIC | Age: 83
End: 2023-04-17
Payer: COMMERCIAL

## 2023-04-17 DIAGNOSIS — E11.9 TYPE 2 DIABETES MELLITUS WITHOUT COMPLICATION, WITH LONG-TERM CURRENT USE OF INSULIN (H): ICD-10-CM

## 2023-04-17 DIAGNOSIS — Z79.4 TYPE 2 DIABETES MELLITUS WITHOUT COMPLICATION, WITH LONG-TERM CURRENT USE OF INSULIN (H): ICD-10-CM

## 2023-04-17 DIAGNOSIS — N18.32 STAGE 3B CHRONIC KIDNEY DISEASE (H): ICD-10-CM

## 2023-04-17 DIAGNOSIS — E78.5 HYPERLIPIDEMIA LDL GOAL <70: ICD-10-CM

## 2023-04-17 LAB
ANION GAP SERPL CALCULATED.3IONS-SCNC: 12 MMOL/L (ref 7–15)
BUN SERPL-MCNC: 19.2 MG/DL (ref 8–23)
CALCIUM SERPL-MCNC: 10.9 MG/DL (ref 8.8–10.2)
CHLORIDE SERPL-SCNC: 102 MMOL/L (ref 98–107)
CHOLEST SERPL-MCNC: 142 MG/DL
CREAT SERPL-MCNC: 1.66 MG/DL (ref 0.67–1.17)
CREAT UR-MCNC: 93.5 MG/DL
DEPRECATED HCO3 PLAS-SCNC: 26 MMOL/L (ref 22–29)
GFR SERPL CREATININE-BSD FRML MDRD: 41 ML/MIN/1.73M2
GLUCOSE SERPL-MCNC: 83 MG/DL (ref 70–99)
HBA1C MFR BLD: 7.5 % (ref 0–5.6)
HDLC SERPL-MCNC: 39 MG/DL
LDLC SERPL CALC-MCNC: 55 MG/DL
MICROALBUMIN UR-MCNC: <12 MG/L
MICROALBUMIN/CREAT UR: NORMAL MG/G{CREAT}
NONHDLC SERPL-MCNC: 103 MG/DL
POTASSIUM SERPL-SCNC: 4.3 MMOL/L (ref 3.4–5.3)
SODIUM SERPL-SCNC: 140 MMOL/L (ref 136–145)
TRIGL SERPL-MCNC: 239 MG/DL

## 2023-04-17 PROCEDURE — 82570 ASSAY OF URINE CREATININE: CPT

## 2023-04-17 PROCEDURE — 36415 COLL VENOUS BLD VENIPUNCTURE: CPT

## 2023-04-17 PROCEDURE — 80048 BASIC METABOLIC PNL TOTAL CA: CPT

## 2023-04-17 PROCEDURE — 82043 UR ALBUMIN QUANTITATIVE: CPT

## 2023-04-17 PROCEDURE — 83036 HEMOGLOBIN GLYCOSYLATED A1C: CPT

## 2023-04-17 PROCEDURE — 80061 LIPID PANEL: CPT

## 2023-05-30 DIAGNOSIS — E78.5 HYPERLIPIDEMIA LDL GOAL <70: ICD-10-CM

## 2023-05-31 RX ORDER — ATORVASTATIN CALCIUM 40 MG/1
40 TABLET, FILM COATED ORAL DAILY
Qty: 90 TABLET | Refills: 0 | Status: SHIPPED | OUTPATIENT
Start: 2023-05-31 | End: 2023-08-28

## 2023-05-31 NOTE — TELEPHONE ENCOUNTER
Prescription approved per [facility] Refill Protocol.  Qi Turcios, RN  [facility] Triage Nurse

## 2023-06-01 ASSESSMENT — ENCOUNTER SYMPTOMS
PALPITATIONS: 1
ARTHRALGIAS: 1
DISTURBANCES IN COORDINATION: 0
SPUTUM PRODUCTION: 0
HYPERTENSION: 1
HEARTBURN: 1
STIFFNESS: 1
VOMITING: 0
WEIGHT LOSS: 1
SORE THROAT: 1
POLYDIPSIA: 0
DECREASED APPETITE: 0
DOUBLE VISION: 0
HEMOPTYSIS: 0
ORTHOPNEA: 0
MEMORY LOSS: 1
POLYPHAGIA: 0
NUMBNESS: 0
MYALGIAS: 1
DIARRHEA: 1
TINGLING: 0
BACK PAIN: 1
TREMORS: 0
SLEEP DISTURBANCES DUE TO BREATHING: 0
HALLUCINATIONS: 0
DIFFICULTY URINATING: 0
FEVER: 0
EYE REDNESS: 0
SKIN CHANGES: 0
RECTAL PAIN: 0
JAUNDICE: 0
NECK MASS: 0
HEMATURIA: 0
EXERCISE INTOLERANCE: 0
SINUS PAIN: 0
HEADACHES: 0
LIGHT-HEADEDNESS: 0
POOR WOUND HEALING: 0
COUGH: 0
SMELL DISTURBANCE: 0
SHORTNESS OF BREATH: 1
CONSTIPATION: 0
SINUS CONGESTION: 0
DYSURIA: 0
POSTURAL DYSPNEA: 0
ALTERED TEMPERATURE REGULATION: 0
SPEECH CHANGE: 0
TROUBLE SWALLOWING: 0
TASTE DISTURBANCE: 0
CHILLS: 0
WEIGHT GAIN: 0
DIZZINESS: 1
JOINT SWELLING: 0
MUSCLE CRAMPS: 1
LOSS OF CONSCIOUSNESS: 0
ABDOMINAL PAIN: 1
HYPOTENSION: 0
BOWEL INCONTINENCE: 0
PARALYSIS: 0
COUGH DISTURBING SLEEP: 0
NAUSEA: 0
LEG PAIN: 0
SEIZURES: 0
DYSPNEA ON EXERTION: 1
NECK PAIN: 1
BLOATING: 1
EYE PAIN: 0
SNORES LOUDLY: 0
INCREASED ENERGY: 1
MUSCLE WEAKNESS: 1
WHEEZING: 0
EYE WATERING: 0
HOARSE VOICE: 0
EYE IRRITATION: 1
NAIL CHANGES: 0
FLANK PAIN: 0
SYNCOPE: 0
BLOOD IN STOOL: 0
FATIGUE: 1
NIGHT SWEATS: 1
WEAKNESS: 1

## 2023-06-05 DIAGNOSIS — J31.0 CHRONIC RHINITIS: ICD-10-CM

## 2023-06-06 ENCOUNTER — OFFICE VISIT (OUTPATIENT)
Dept: PHYSICAL MEDICINE AND REHAB | Facility: CLINIC | Age: 83
End: 2023-06-06
Attending: NURSE PRACTITIONER
Payer: COMMERCIAL

## 2023-06-06 VITALS — SYSTOLIC BLOOD PRESSURE: 130 MMHG | DIASTOLIC BLOOD PRESSURE: 60 MMHG | OXYGEN SATURATION: 97 % | HEART RATE: 61 BPM

## 2023-06-06 DIAGNOSIS — N18.32 STAGE 3B CHRONIC KIDNEY DISEASE (H): ICD-10-CM

## 2023-06-06 DIAGNOSIS — M85.80 OSTEOPENIA, UNSPECIFIED LOCATION: ICD-10-CM

## 2023-06-06 DIAGNOSIS — M54.50 ACUTE RIGHT-SIDED LOW BACK PAIN WITHOUT SCIATICA: ICD-10-CM

## 2023-06-06 DIAGNOSIS — M54.50 ACUTE MIDLINE LOW BACK PAIN WITHOUT SCIATICA: Primary | ICD-10-CM

## 2023-06-06 PROCEDURE — 99203 OFFICE O/P NEW LOW 30 MIN: CPT | Mod: GC | Performed by: PHYSICAL MEDICINE & REHABILITATION

## 2023-06-06 RX ORDER — AZELASTINE 1 MG/ML
SPRAY, METERED NASAL
Qty: 90 ML | Refills: 3 | Status: SHIPPED | OUTPATIENT
Start: 2023-06-06 | End: 2024-07-16

## 2023-06-06 ASSESSMENT — PAIN SCALES - GENERAL: PAINLEVEL: MILD PAIN (3)

## 2023-06-06 NOTE — PROGRESS NOTES
Patient seen at the request of Cait Bloom CNP for an opinion and evaluation of low back pain.      HISTORY OF PRESENT ILLNESS:  Pascual Perea is a 82 year old male who presents with a chief complaint of low back pain.     Pain began April 4th 2023 and was associated with trauma. Fell backwards on    The pain is localized to the midline low back (L=R) without radiation; he denies numbness/tingling; described as aching in nature, constant. Pain is reported as 2-3/10 at rest today and up to 5-6/10 at worst in the last week. Symptoms are worse with laundry, transitional movements, extension; and improved with rest, flexion, hot pads, Tylenol. He does not report pain-related sleep disturbance.     Functional limitations: does not stop him from doing daily activities, but is being slower and more careful with most activities.       PRIOR INJURIES/TREATMENT:   Ice/Heat: Heat  Brace: No  Physical Therapy:       - Current Pain Medications -   Tylenol    - Prior/Trialed Pain Medications -   None    Prior Procedures:  Date      Procedure     Improvement (%)  None         Prior Related Surgery: None     Other (acupuncture, OMT, CMM, TENS, DME, etc.): None    Specialists Seen - (with most recent, available notes and clinic visits reviewed)   1. None    IMAGING - reviewed     XR, lumbar spine, 4/15/23  IMPRESSION: Five lumbar-type vertebrae. Grade 1 degenerative anterolisthesis of L5 upon S1. Alignment otherwise normal. Vertebral body heights normal. No fractures. Facet arthropathy at L3-L4, L4-L5 and L5-S1.      Review Of Systems:  I am responding to those symptoms which are directly relevant to the specific indication for my consultation. I recommend that the patient follow up with their primary or referring provider to pursue any other symptoms which may be of concern.       Medical History:  He  has a past medical history of Adhesive capsulitis of shoulder, Anemia (03/10/2014), Anemia, unspecified, Antiplatelet or  antithrombotic long-term use, Arrhythmia, CAD (coronary artery disease), Gastroparesis, History of cardioversion (04/24/2018), History of cardioversion (02/13/2019), Hyperlipidemia LDL goal <70 (05/26/2011), Hypertension, Hypothyroidism, Impotence of organic origin, Laceration of finger (06/2010), Numbness and tingling, BRANDON (obstructive sleep apnea), Osteopenia, Pulmonary hypertension (H) (04/06/2012), Sensorineural hearing loss, unspecified, Stented coronary artery (1997), Testosterone deficiency, Type 2 diabetes mellitus without complication  (goal A1C<8) (10/24/2015), Typical atrial flutter (H) (04/18/2016), Unspecified hypothyroidism, and Vitamin D deficiency (09/03/2011).     He  has a past surgical history that includes NONSPECIFIC PROCEDURE (1997); NONSPECIFIC PROCEDURE (08/2001); Cardiac surgery; colonoscopy; coronary artery bypass (1997); Coronary Angiography Adult Order; Heart Cath, Angioplasty (04/2012); Phacoemulsification clear cornea with standard intraocular lens implant (Left, 06/08/2015); Phacoemulsification clear cornea with standard intraocular lens implant (Right, 06/22/2015); Septoplasty, turbinoplasty, combined (Bilateral, 08/18/2016); Excise mass head (Left, 08/18/2016); Cardioversion (04/24/2018); Anesthesia cardioversion (N/A, 02/13/2019); Laparoscopic cholecystectomy (N/A, 03/17/2019); Endoscopic ultrasound upper gastrointestinal tract (GI) (N/A, 03/14/2019); Anesthesia cardioversion (N/A, 05/22/2019); and Esophagoscopy, gastroscopy, duodenoscopy (EGD), combined (N/A, 4/15/2022).    Family History  His family history includes Cardiovascular in his brother; Diabetes in his mother, sister, and sister; Genitourinary Problems in his father; Heart Disease in his brother and mother; Hypertension in his father; Myocardial Infarction in his mother.     Social History:  Work: Retired, former   Current living situation: [place], lives with son.   He  reports that he quit smoking about  58 years ago. His smoking use included cigarettes, cigars, and pipe. He started smoking about 65 years ago. He has a 6.00 pack-year smoking history. He has never used smokeless tobacco. He reports that he does not currently use alcohol. He reports that he does not use drugs.        Current Medications:   He has a current medication list which includes the following prescription(s): apixaban anticoagulant, atorvastatin, azelastine, accu-chek jordyn plus, blood glucose calibration, freestyle harry 2 reader, freestyle harry 2 sensor, fenofibrate micronized, glucose, insulin aspart flexpen, insulin glargine, levothyroxine, nitroglycerin, novofine autocover pen needle, and torsemide.     Allergies:   Allergies   Allergen Reactions     Omeprazole      diarrhea     Pantoprazole      diarrhea       VITALS  /60   Pulse 61   SpO2 97%       PHYSICAL EXAMINATION:  General: Pleasant, straightforward, WDWN individual.  Mental Status: Pleasant, direct, appropriate mood and affect  Resp: breathing is unlabored without audible wheeze  Vascular: Palpable pedal pulses, no cyanosis, no venous stasis changes  Heme: no visible ecchymosis or erythema on extremities  Skin: No notable rash    NEURO  Strength   Right  Left  Hip Flexion   5/5  5/5  Knee Extension  5/5  5/5  Ankle Dorsiflexion  5/5  5/5  Ankle Plantar Flexion  5/5  5/5      Sensation  Sensation intact to light touch in bilateral LE.    Reflexes   Right  Left  Patellar   1+  1+  Achilles   0  0  Clonus    NEG  NEG    MSK  Inspection  Loss of lumbar lordosis. No scars, erythema, swelling, bruising.     Palpation  Tenderness to palpation of midline spine at ~L5. No tenderness over lumbar facets, paraspinal muscles, SI joints, gluteal musculature.    Range of motion  Back: Limited extension due to pain. Forward flexion and bilateral lumbar rotation within normal limits for age matched controls.     Provocation   Right  Left  Radiculopathy  Provocation   SLUMP   NEG  NEG       ASSESSMENT:  Pascual Perea is a pleasant 82 year old male who presents with ~2 months of midline low back pain after a ground level fall. Exam remarkable for tenderness over the ~L5 spinous process. Xray from 4/15/23 without acute fracture. Differential includes lumbar strain, insufficiency fracture, flare of facet arthropathy, bone contusion. Using shared decision making, [facility] elected to proceed with a trial of a salonpas patch, physical therapy, and an MRI of his lumbar spine. Orders for these were placed today.    Complicating co-morbidities include:   #.  CHF/CAD on systemic oral anticoagulation with Eliquis  #.  CKD, stage 3    PLAN:  1. Diagnostic Work Up:   a. MRI lumbar spine given history of trauma, negative Xray and persistent symptoms   2. Therapies / Home Exercise Program:  a. Physical therapy referral placed  3. Medications:  a. Salonpas patch sample provided, consider OTC lidoderm patch (eg salonpas) if helpful  4. Interventions:  a. None  5. Referrals:  a. None  6. Follow Up:  a. Will plan to discuss results of the MRI and determine follow up plan as available.     Ready to learn, no apparent learning barriers.  Education provided on treatment plan according to patient's preferred learning style.  Patient verbalizes understanding.   __________________________________  Aníbal Blanco MD  Physical Medicine and Rehabilitation    I was physically present for the E/M service provided. I agree with the House Officer note and plan, which I have reviewed and edited where appropriate.  ________________________________   Abiodun Nash MD  Department of Physical Medicine & Rehabilitation         40 minutes spent by me on the date of the encounter doing chart review, history and exam, documentation and further activities per the note

## 2023-06-06 NOTE — LETTER
6/6/2023       RE: Pascual Perea  [address]  [address]  [facility] [address]     Dear Colleague,    Thank you for referring your patient, Pascual Perea, to the [facility]. Please see a copy of my visit note below.    Patient seen at the request of Cait Bloom CNP for an opinion and evaluation of low back pain.      HISTORY OF PRESENT ILLNESS:  Pascual Perea is a 82 year old male who presents with a chief complaint of low back pain.     Pain began April 4th 2023 and was associated with trauma. Fell backwards on    The pain is localized to the midline low back (L=R) without radiation; he denies numbness/tingling; described as aching in nature, constant. Pain is reported as 2-3/10 at rest today and up to 5-6/10 at worst in the last week. Symptoms are worse with laundry, transitional movements, extension; and improved with rest, flexion, hot pads, Tylenol. He does not report pain-related sleep disturbance.     Functional limitations: does not stop him from doing daily activities, but is being slower and more careful with most activities.       PRIOR INJURIES/TREATMENT:   Ice/Heat: Heat  Brace: No  Physical Therapy:       - Current Pain Medications -   Tylenol    - Prior/Trialed Pain Medications -   None    Prior Procedures:  Date      Procedure     Improvement (%)  None         Prior Related Surgery: None     Other (acupuncture, OMT, CMM, TENS, DME, etc.): None    Specialists Seen - (with most recent, available notes and clinic visits reviewed)   1. None    IMAGING - reviewed     XR, lumbar spine, 4/15/23  IMPRESSION: Five lumbar-type vertebrae. Grade 1 degenerative anterolisthesis of L5 upon S1. Alignment otherwise normal. Vertebral body heights normal. No fractures. Facet arthropathy at L3-L4, L4-L5 and L5-S1.      Review Of Systems:  I am responding to those symptoms which are directly relevant to the specific indication for  my consultation. I recommend that the patient follow up with their primary or referring provider to pursue any other symptoms which may be of concern.       Medical History:  He  has a past medical history of Adhesive capsulitis of shoulder, Anemia (03/10/2014), Anemia, unspecified, Antiplatelet or antithrombotic long-term use, Arrhythmia, CAD (coronary artery disease), Gastroparesis, History of cardioversion (04/24/2018), History of cardioversion (02/13/2019), Hyperlipidemia LDL goal <70 (05/26/2011), Hypertension, Hypothyroidism, Impotence of organic origin, Laceration of finger (06/2010), Numbness and tingling, BRANDON (obstructive sleep apnea), Osteopenia, Pulmonary hypertension (H) (04/06/2012), Sensorineural hearing loss, unspecified, Stented coronary artery (1997), Testosterone deficiency, Type 2 diabetes mellitus without complication  (goal A1C<8) (10/24/2015), Typical atrial flutter (H) (04/18/2016), Unspecified hypothyroidism, and Vitamin D deficiency (09/03/2011).     He  has a past surgical history that includes NONSPECIFIC PROCEDURE (1997); NONSPECIFIC PROCEDURE (08/2001); Cardiac surgery; colonoscopy; coronary artery bypass (1997); Coronary Angiography Adult Order; Heart Cath, Angioplasty (04/2012); Phacoemulsification clear cornea with standard intraocular lens implant (Left, 06/08/2015); Phacoemulsification clear cornea with standard intraocular lens implant (Right, 06/22/2015); Septoplasty, turbinoplasty, combined (Bilateral, 08/18/2016); Excise mass head (Left, 08/18/2016); Cardioversion (04/24/2018); Anesthesia cardioversion (N/A, 02/13/2019); Laparoscopic cholecystectomy (N/A, 03/17/2019); Endoscopic ultrasound upper gastrointestinal tract (GI) (N/A, 03/14/2019); Anesthesia cardioversion (N/A, 05/22/2019); and Esophagoscopy, gastroscopy, duodenoscopy (EGD), combined (N/A, 4/15/2022).    Family History  His family history includes Cardiovascular in his brother; Diabetes in his mother, sister, and  sister; Genitourinary Problems in his father; Heart Disease in his brother and mother; Hypertension in his father; Myocardial Infarction in his mother.     Social History:  Work: Retired, former   Current living situation: [place], lives with son.   He  reports that he quit smoking about 58 years ago. His smoking use included cigarettes, cigars, and pipe. He started smoking about 65 years ago. He has a 6.00 pack-year smoking history. He has never used smokeless tobacco. He reports that he does not currently use alcohol. He reports that he does not use drugs.        Current Medications:   He has a current medication list which includes the following prescription(s): apixaban anticoagulant, atorvastatin, azelastine, accu-chek jordyn plus, blood glucose calibration, freestyle harry 2 reader, freestyle harry 2 sensor, fenofibrate micronized, glucose, insulin aspart flexpen, insulin glargine, levothyroxine, nitroglycerin, novofine autocover pen needle, and torsemide.     Allergies:   Allergies   Allergen Reactions    Omeprazole      diarrhea    Pantoprazole      diarrhea       VITALS  /60   Pulse 61   SpO2 97%       PHYSICAL EXAMINATION:  General: Pleasant, straightforward, WDWN individual.  Mental Status: Pleasant, direct, appropriate mood and affect  Resp: breathing is unlabored without audible wheeze  Vascular: Palpable pedal pulses, no cyanosis, no venous stasis changes  Heme: no visible ecchymosis or erythema on extremities  Skin: No notable rash    NEURO  Strength   Right  Left  Hip Flexion   5/5  5/5  Knee Extension  5/5  5/5  Ankle Dorsiflexion  5/5  5/5  Ankle Plantar Flexion  5/5  5/5      Sensation  Sensation intact to light touch in bilateral LE.    Reflexes   Right  Left  Patellar   1+  1+  Achilles   0  0  Clonus    NEG  NEG    MSK  Inspection  Loss of lumbar lordosis. No scars, erythema, swelling, bruising.     Palpation  Tenderness to palpation of midline spine at ~L5. No tenderness  over lumbar facets, paraspinal muscles, SI joints, gluteal musculature.    Range of motion  Back: Limited extension due to pain. Forward flexion and bilateral lumbar rotation within normal limits for age matched controls.     Provocation   Right  Left  Radiculopathy Provocation   SLUMP   NEG  NEG       ASSESSMENT:  Pascual Perea is a pleasant 82 year old male who presents with ~2 months of midline low back pain after a ground level fall. Exam remarkable for tenderness over the ~L5 spinous process. Xray from 4/15/23 without acute fracture. Differential includes lumbar strain, insufficiency fracture, flare of facet arthropathy, bone contusion. Using shared decision making, [facility] elected to proceed with a trial of a salonpas patch, physical therapy, and an MRI of his lumbar spine. Orders for these were placed today.    Complicating co-morbidities include:   #.  CHF/CAD on systemic oral anticoagulation with Eliquis  #.  CKD, stage 3    PLAN:  Diagnostic Work Up:   MRI lumbar spine given history of trauma, negative Xray and persistent symptoms   Therapies / Home Exercise Program:  Physical therapy referral placed  Medications:  Salonpas patch sample provided, consider OTC lidoderm patch (eg salonpas) if helpful  Interventions:  None  Referrals:  None  Follow Up:  Will plan to discuss results of the MRI and determine follow up plan as available.     Ready to learn, no apparent learning barriers.  Education provided on treatment plan according to patient's preferred learning style.  Patient verbalizes understanding.   __________________________________  Aníbal Blanco MD  Physical Medicine and Rehabilitation    I was physically present for the E/M service provided. I agree with the House Officer note and plan, which I have reviewed and edited where appropriate.  ________________________________   Abiodun Nash MD  Department of Physical Medicine & Rehabilitation         40 minutes spent by me on the date of the encounter  doing chart review, history and exam, documentation and further activities per the note

## 2023-06-06 NOTE — NURSING NOTE
"Pascual Perea is a 82 year old male who presents for:  Chief Complaint   Patient presents with     Consult     Acute right sided low back        Initial Vitals:  /60   Pulse 61   SpO2 97%  Estimated body mass index is 28.45 kg/m  as calculated from the following:    Height as of 12/15/22: 5' 11\" (1.803 m).    Weight as of 4/15/23: 204 lb (92.5 kg).. There is no height or weight on file to calculate BSA. BP completed using cuff size: regular  Mild Pain (3)    Nursing Comments:     Tad Boykin MA   "

## 2023-06-07 ENCOUNTER — TELEPHONE (OUTPATIENT)
Dept: PHYSICAL MEDICINE AND REHAB | Facility: CLINIC | Age: 83
End: 2023-06-07
Payer: COMMERCIAL

## 2023-06-07 NOTE — TELEPHONE ENCOUNTER
[facility] Call Center    Phone Message    May a detailed message be left on voicemail: yes     Reason for Call: Other: Patient was contacted to set up MRI requested by Dr. Nash. Before scheduling they asked if he had ever has steel in his eye. He had a steel piece removed from his eye 30-40 years ago by a doctor. However they are requesting an orbital xray be ordered for the patient prior to MRI to insure the steel was fully removed.      Action Taken: Message routed to:  Other: [phone]    Travel Screening: Not Applicable

## 2023-06-12 DIAGNOSIS — S00.259A METAL FOREIGN BODY IN EYE REGION: Primary | ICD-10-CM

## 2023-06-13 NOTE — TELEPHONE ENCOUNTER
Hortencia Barboza NP placed Orbital Xray order and patient is scheduled for both the Orbital Xray and then the MRI lumbar spine    DIONNA StoddardN RN  RN Care Coordinator for Physical Medicine and Rehabilitation  [facility] [place] - [address] [address]  Office: [phone]  Fax: [phone]

## 2023-06-20 ENCOUNTER — OFFICE VISIT (OUTPATIENT)
Dept: URGENT CARE | Facility: URGENT CARE | Age: 83
End: 2023-06-20
Payer: COMMERCIAL

## 2023-06-20 VITALS
TEMPERATURE: 97.9 F | OXYGEN SATURATION: 98 % | SYSTOLIC BLOOD PRESSURE: 146 MMHG | RESPIRATION RATE: 22 BRPM | DIASTOLIC BLOOD PRESSURE: 70 MMHG | HEART RATE: 67 BPM

## 2023-06-20 DIAGNOSIS — L03.115 CELLULITIS OF RIGHT LOWER EXTREMITY: Primary | ICD-10-CM

## 2023-06-20 PROCEDURE — 99213 OFFICE O/P EST LOW 20 MIN: CPT | Performed by: PHYSICIAN ASSISTANT

## 2023-06-20 RX ORDER — CEPHALEXIN 500 MG/1
500 CAPSULE ORAL 4 TIMES DAILY
Qty: 28 CAPSULE | Refills: 0 | Status: SHIPPED | OUTPATIENT
Start: 2023-06-20 | End: 2023-06-27

## 2023-06-20 NOTE — PATIENT INSTRUCTIONS
-Complete antibiotics as prescribed. Take with food to help prevent stomach upset.   -Elevate the affected limb as much as possible. This will help keep the swelling down.  -Keep the infected area clean; warm water soak with mild soap for 10-15 minutes 3 times a day. Pat dry.  -If develop a fever, increased redness, pain, drainage or swelling from the area, should contact primary care clinic/provider.  -Symptoms should be improving within 48 hours and resolved in next 7-10 days.

## 2023-06-20 NOTE — PROGRESS NOTES
Assessment/Plan:    C/w cellulitis; Rx Keflex. Elevate legs. Hx CKD, no dose adjustment needed based on CrCl.    See patient instructions below.    At the end of the encounter, I discussed results, diagnosis, medications. Discussed red flags for immediate return to clinic/ER, as well as indications for follow up if no improvement. Patient understood and agreed to plan. Patient was stable for discharge.      ICD-10-CM    1. Cellulitis of right lower extremity  L03.115 cephALEXin (KEFLEX) 500 MG capsule            Return in about 3 days (around 6/23/2023) for Follow up w/ primary care provider if not better.    JOSÉ Meza, PA-[facility]  -----------------------------------------------------------------------------------------------------------------------------------------------------    HPI:  Pascual Perea is a 82 year old male with hx of stage 3 CKD who presents for evaluation of redness and tenderness surrounding wound on R shin onset 2 days ago. He ran into the  door 5 days ago and had a small wound on his shin initially. No treatments tried. Patient reports no fever/chills, swelling, purulent drainage, or any other symptoms.     Past Medical History:   Diagnosis Date     Adhesive capsulitis of shoulder      Anemia 03/10/2014     Anemia, unspecified      Antiplatelet or antithrombotic long-term use      Arrhythmia     Atrial fib/flutter     CAD (coronary artery disease)      Gastroparesis     delayed emptying study May 2022     History of cardioversion 04/24/2018    a single synchronized shock of 120 joules restored normal sinus rhythm     History of cardioversion 02/13/2019    single shock , 200 joules successul in restoring NSR     Hyperlipidemia LDL goal <70 05/26/2011     Hypertension      Hypothyroidism      Impotence of organic origin      Laceration of finger 06/2010     left thumb s/p sutures     Numbness and tingling     diabetic neuropathy  bilateral feet     BRANDON (obstructive sleep apnea)     intolerant of CPAP, uses it occasionally.  Using mandibular device occasionally     Osteopenia      Pulmonary hypertension (H) 04/06/2012     Sensorineural hearing loss, unspecified      Stented coronary artery 1997    CABG      Testosterone deficiency      Type 2 diabetes mellitus without complication  (goal A1C<8) 10/24/2015     Typical atrial flutter (H) 04/18/2016     Unspecified hypothyroidism      Vitamin D deficiency 09/03/2011       Vitals:    06/20/23 1117   BP: (!) 146/70   BP Location: Right arm   Patient Position: Sitting   Cuff Size: Adult Regular   Pulse: 67   Resp: 22   Temp: 97.9  F (36.6  C)   TempSrc: Tympanic   SpO2: 98%       Physical Exam  Vitals and nursing note reviewed.   Pulmonary:      Effort: Pulmonary effort is normal.   Musculoskeletal:        Legs:    Neurological:      Mental Status: He is alert.         Labs/Imaging:  No results found. However, due to the size of the patient record, not all encounters were searched. Please check Results Review for a complete set of results.  No results found for this or any previous visit (from the past 24 hour(s)).        Patient Instructions   -Complete antibiotics as prescribed. Take with food to help prevent stomach upset.   -Elevate the affected limb as much as possible. This will help keep the swelling down.  -Keep the infected area clean; warm water soak with mild soap for 10-15 minutes 3 times a day. Pat dry.  -If develop a fever, increased redness, pain, drainage or swelling from the area, should contact primary care clinic/provider.  -Symptoms should be improving within 48 hours and resolved in next 7-10 days.

## 2023-06-23 ENCOUNTER — HOSPITAL ENCOUNTER (OUTPATIENT)
Dept: MRI IMAGING | Facility: CLINIC | Age: 83
Discharge: HOME | End: 2023-06-23
Attending: PHYSICAL MEDICINE & REHABILITATION
Payer: COMMERCIAL

## 2023-06-23 ENCOUNTER — HOSPITAL ENCOUNTER (OUTPATIENT)
Dept: GENERAL RADIOLOGY | Facility: CLINIC | Age: 83
Discharge: HOME | End: 2023-06-23
Attending: PHYSICAL MEDICINE & REHABILITATION
Payer: COMMERCIAL

## 2023-06-23 DIAGNOSIS — S00.259A METAL FOREIGN BODY IN EYE REGION: ICD-10-CM

## 2023-06-23 DIAGNOSIS — M54.50 ACUTE MIDLINE LOW BACK PAIN WITHOUT SCIATICA: ICD-10-CM

## 2023-06-23 PROCEDURE — 70030 X-RAY EYE FOR FOREIGN BODY: CPT

## 2023-06-23 PROCEDURE — 72148 MRI LUMBAR SPINE W/O DYE: CPT

## 2023-06-26 DIAGNOSIS — E78.5 HYPERLIPIDEMIA LDL GOAL <70: ICD-10-CM

## 2023-06-27 RX ORDER — FENOFIBRATE 67 MG/1
CAPSULE ORAL
Qty: 90 CAPSULE | Refills: 3 | Status: SHIPPED | OUTPATIENT
Start: 2023-06-27 | End: 2024-06-25

## 2023-06-29 ENCOUNTER — OFFICE VISIT (OUTPATIENT)
Dept: INTERNAL MEDICINE | Facility: CLINIC | Age: 83
End: 2023-06-29
Payer: COMMERCIAL

## 2023-06-29 VITALS
WEIGHT: 201.2 LBS | HEIGHT: 71 IN | TEMPERATURE: 97 F | SYSTOLIC BLOOD PRESSURE: 126 MMHG | HEART RATE: 75 BPM | DIASTOLIC BLOOD PRESSURE: 70 MMHG | OXYGEN SATURATION: 97 % | BODY MASS INDEX: 28.17 KG/M2

## 2023-06-29 DIAGNOSIS — N18.32 STAGE 3B CHRONIC KIDNEY DISEASE (H): ICD-10-CM

## 2023-06-29 DIAGNOSIS — E83.52 HYPERCALCEMIA: ICD-10-CM

## 2023-06-29 DIAGNOSIS — I48.20 CHRONIC ATRIAL FIBRILLATION, UNSPECIFIED (H): ICD-10-CM

## 2023-06-29 DIAGNOSIS — I10 ESSENTIAL HYPERTENSION, BENIGN: ICD-10-CM

## 2023-06-29 DIAGNOSIS — Z79.4 TYPE 2 DIABETES MELLITUS WITH STAGE 3B CHRONIC KIDNEY DISEASE, WITH LONG-TERM CURRENT USE OF INSULIN (H): Primary | ICD-10-CM

## 2023-06-29 DIAGNOSIS — E11.22 TYPE 2 DIABETES MELLITUS WITH STAGE 3B CHRONIC KIDNEY DISEASE, WITH LONG-TERM CURRENT USE OF INSULIN (H): Primary | ICD-10-CM

## 2023-06-29 DIAGNOSIS — G47.62 NOCTURNAL LEG CRAMPS: ICD-10-CM

## 2023-06-29 DIAGNOSIS — N18.32 TYPE 2 DIABETES MELLITUS WITH STAGE 3B CHRONIC KIDNEY DISEASE, WITH LONG-TERM CURRENT USE OF INSULIN (H): Primary | ICD-10-CM

## 2023-06-29 LAB
ANION GAP SERPL CALCULATED.3IONS-SCNC: 11 MMOL/L (ref 7–15)
BUN SERPL-MCNC: 19 MG/DL (ref 8–23)
CALCIUM SERPL-MCNC: 11.1 MG/DL (ref 8.8–10.2)
CHLORIDE SERPL-SCNC: 102 MMOL/L (ref 98–107)
CREAT SERPL-MCNC: 1.59 MG/DL (ref 0.67–1.17)
DEPRECATED HCO3 PLAS-SCNC: 28 MMOL/L (ref 22–29)
FERRITIN SERPL-MCNC: 182 NG/ML (ref 31–409)
GFR SERPL CREATININE-BSD FRML MDRD: 43 ML/MIN/1.73M2
GLUCOSE SERPL-MCNC: 99 MG/DL (ref 70–99)
MAGNESIUM SERPL-MCNC: 2 MG/DL (ref 1.7–2.3)
POTASSIUM SERPL-SCNC: 4 MMOL/L (ref 3.4–5.3)
SODIUM SERPL-SCNC: 141 MMOL/L (ref 136–145)

## 2023-06-29 PROCEDURE — 36415 COLL VENOUS BLD VENIPUNCTURE: CPT | Performed by: INTERNAL MEDICINE

## 2023-06-29 PROCEDURE — 80048 BASIC METABOLIC PNL TOTAL CA: CPT | Performed by: INTERNAL MEDICINE

## 2023-06-29 PROCEDURE — 99214 OFFICE O/P EST MOD 30 MIN: CPT | Performed by: INTERNAL MEDICINE

## 2023-06-29 PROCEDURE — 82728 ASSAY OF FERRITIN: CPT | Performed by: INTERNAL MEDICINE

## 2023-06-29 PROCEDURE — 83735 ASSAY OF MAGNESIUM: CPT | Performed by: INTERNAL MEDICINE

## 2023-06-29 PROCEDURE — 99207 PR FOOT EXAM NO CHARGE: CPT | Mod: 25 | Performed by: INTERNAL MEDICINE

## 2023-06-29 NOTE — PATIENT INSTRUCTIONS
Increase Novolog at  breakfast and lunch  by 1  unit from  usual   Reduce Novolog at supper  by 1 unit  from usual    Reduce Lantus to 62 units daily at  bedtime  Call  [phone] or use [facility] to schedule a future lab appointment  non-fasting in 3 months.  (Early October) for kidney and diabetes   Lab  today for leg  cramps. If labs OK, will defer prescription  treatment unless  you call or email in [facility] to request. Will send results in [facility]. Appt with me not needed   I would recommend a covid bivalent booster vaccination. You may have it done at any pharmacy. If you wish to have it done at a [facility] pharmacy, then go to [email or web address] to schedule a vaccination appointment  Influenza/flu vaccinations in the  Fall (mid October - mid November). May get through a pharmacy or at clinic

## 2023-06-29 NOTE — PROGRESS NOTES
ASSESSMENT:   1. Type 2 diabetes mellitus with stage 3b chronic kidney disease, with long-term current use of insulin (H)   Needs improved control. See plan discussion for changes insulin dosing and repeat labs 3 mos  - insulin aspart (NOVOLOG PEN) 100 UNIT/ML pen; INJECT UNDER THE SKIN 3 TIMES PER DAY (BEFORE MEALS) 3 UNITS PER CARB CHOICE (15GM) APPROX  4-8 UNITS/MEAL PLUS PRIMING  Dispense: 15 mL  - insulin glargine (LANTUS PEN) 100 UNIT/ML pen; Inject 62 Units Subcutaneous At Bedtime  Dispense: 75 mL; Refill: 3  - Basic metabolic panel; Future  - Ferritin; Future  - FOOT EXAM  - Basic metabolic panel  - Ferritin  - PRIMARY CARE FOLLOW-UP SCHEDULING; Future  - Hemoglobin A1c; Future    2. Stage 3b chronic kidney disease (H)   Previous stable. Needs lab f/u  - Basic metabolic panel; Future  - Basic metabolic panel  - PRIMARY CARE FOLLOW-UP SCHEDULING; Future       3. Essential hypertension, benign  controlled    4. Nocturnal leg cramps   Mild. Labs for things that can improve with nutrition. Pt states sx not bad enough that wishes to use Rx therapy. WIll also make sure calcium now worsened as below  - Basic metabolic panel; Future  - Ferritin; Future  - Magnesium; Future  - Basic metabolic panel  - Ferritin  - Magnesium    5. Hypercalcemia   PTH normal 1 year ago when elevated calcium 10.3 at that time. Needs f/u lab  - Basic metabolic panel; Future  - Basic metabolic panel    6. Chronic atrial fibrillation, unspecified (H)   Rate controlled. On Eliquis      PLAN:   Increase Novolog at  breakfast and lunch  by 1  unit from  usual   Reduce Novolog at supper  by 1 unit  from usual    Reduce Lantus to 62 units daily at  bedtime  Call  [phone] or use [employer] to schedule a future lab appointment  non-fasting in 3 months.  (Early October) for kidney and diabetes   Lab  today for leg  cramps. If labs OK, will defer prescription  treatment unless  you call or email in [employer] to request. Will send results in  Emily. Appt with me not needed  Continue other medications   I would recommend a covid bivalent booster vaccination. You may have it done at any pharmacy. If you wish to have it done at a [facility] pharmacy, then go to [email or web address] to schedule a vaccination appointment  Influenza/flu vaccinations in the  Fall (mid October - mid November). May get through a pharmacy or at clinic      (Chart documentation was completed, in part, with [employer] voice-recognition software. Even though reviewed, some grammatical, spelling, and word errors may remain.)    Andrew Elizalde MD  Internal Medicine Department  [facility]      Max Naylor is a 82 year old, presenting for the following health issues:  Diabetes and Hypertension       History of Present Illness       Diabetes:   He presents for follow up of diabetes.  He is checking home blood glucose with a continuous glucose monitor.  He checks blood glucose before meals and at bedtime.  Blood glucose is sometimes over 200 and sometimes under 70. He is aware of hypoglycemia symptoms including weakness. He is concerned about blood sugar frequently over 200.  He is having numbness in feet and blurry vision. The patient has not had a diabetic eye exam in the last 12 months.         Hypertension: He presents for follow up of hypertension.  He does check blood pressure  regularly outside of the clinic. Outside blood pressures have been over 140/90. He follows a low salt diet.     He eats 2-3 servings of fruits and vegetables daily.He consumes 1 sweetened beverage(s) daily.He exercises with enough effort to increase his heart rate 10 to 19 minutes per day.  He exercises with enough effort to increase his heart rate 4 days per week.   He is taking medications regularly.      Most recent lab results reviewed with pt.      Component      Latest Ref Rng 8/3/2022  7:54 AM 10/19/2022  2:01 PM 11/7/2022  10:29 AM   Sodium      136 - 145 mmol/L   138     Potassium      3.4 - 5.3 mmol/L  4.0     Chloride      94 - 109 mmol/L  104     Carbon Dioxide      20 - 32 mmol/L  31     Anion Gap      7 - 15 mmol/L  3     Urea Nitrogen      7 - 30 mg/dL  25     Creatinine      0.67 - 1.17 mg/dL  1.63 (H)     Calcium      8.8 - 10.2 mg/dL  10.5 (H)     Glucose      70 - 99 mg/dL  127 (H)     GFR Estimate      >60 mL/min/1.73m2  42 (L)     Potassium      3.4 - 5.3 mmol/L      Chloride      98 - 107 mmol/L      Carbon Dioxide (CO2)      22 - 29 mmol/L      Urea Nitrogen      8.0 - 23.0 mg/dL      Glucose      70 - 99 mg/dL      Cholesterol      <200 mg/dL      Triglycerides      <150 mg/dL      HDL Cholesterol      >=40 mg/dL      LDL Cholesterol Calculated      <=100 mg/dL      Non HDL Cholesterol      <130 mg/dL      Creatinine Urine      mg/dL      Albumin Urine mg/L      mg/L      Albumin Urine mg/g Cr      Parathyroid Hormone Intact      15 - 65 pg/mL 57      TSH      0.40 - 4.00 mU/L   1.96    Hemoglobin A1C      0.0 - 5.6 %        Component      Latest Ref Rng 12/12/2022  9:44 AM 4/17/2023  2:03 PM 4/17/2023  2:33 PM   Sodium      136 - 145 mmol/L 137  140     Potassium      3.4 - 5.3 mmol/L 4.3      Chloride      94 - 109 mmol/L 101      Carbon Dioxide      20 - 32 mmol/L 30      Anion Gap      7 - 15 mmol/L 6  12     Urea Nitrogen      7 - 30 mg/dL 25      Creatinine      0.67 - 1.17 mg/dL 1.37 (H)  1.66 (H)     Calcium      8.8 - 10.2 mg/dL 10.4 (H)  10.9 (H)     Glucose      70 - 99 mg/dL 219 (H)      GFR Estimate      >60 mL/min/1.73m2 52 (L)  41 (L)     Potassium      3.4 - 5.3 mmol/L  4.3     Chloride      98 - 107 mmol/L  102     Carbon Dioxide (CO2)      22 - 29 mmol/L  26     Urea Nitrogen      8.0 - 23.0 mg/dL  19.2     Glucose      70 - 99 mg/dL  83     Cholesterol      <200 mg/dL  142     Triglycerides      <150 mg/dL  239 (H)     HDL Cholesterol      >=40 mg/dL  39 (L)     LDL Cholesterol Calculated      <=100 mg/dL  55     Non HDL Cholesterol       "<130 mg/dL  103     Creatinine Urine      mg/dL   93.5    Albumin Urine mg/L      mg/L   <12.0    Albumin Urine mg/g Cr   --    Parathyroid Hormone Intact      15 - 65 pg/mL      TSH      0.40 - 4.00 mU/L      Hemoglobin A1C      0.0 - 5.6 %  7.5 (H)        Legend:  (H) High  (L) Low    Denies CP,   abdominal pain, polyuria, polydipsia, vision changes or skin problems. 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 Walking 1/10th mile/day. NO chest pain with walking  Occ cramps in legs wt night at rest. Not with exertion. Frequency not  bad enough that would want to treat with prescription medication but will use diet supplements if low iron or other similar cause    Additional ROS:   Constitutional, HEENT, Cardiovascular, Pulmonary, GI and , Neuro, MSK and Psych review of systems/symptoms are otherwise negative or unchanged from previous, except as noted above.      OBJECTIVE:  /70   Pulse 75   Temp 97  F (36.1  C) (Temporal)   Ht 1.803 m (5' 11\")   Wt 91.3 kg (201 lb 3.2 oz)   SpO2 97%   BMI 28.06 kg/m     Estimated body mass index is 28.06 kg/m  as calculated from the following:    Height as of this encounter: 1.803 m (5' 11\").    Weight as of this encounter: 91.3 kg (201 lb 3.2 oz).     Neck: no adenopathy. Thyroid normal to palpation. No bruits  Pulm: Lungs clear to auscultation   CV: Irregular rates and rhythm  GI: Soft, nontender, Normal active bowel sounds, No hepatosplenomegaly or masses palpable  Ext: Peripheral pulses intact. Trace BLE edema.  Neuro: Normal strength and tone, sensory exam  Slightly reduced  to LTS in feet bilaterally but still present     "

## 2023-07-03 DIAGNOSIS — N18.32 TYPE 2 DIABETES MELLITUS WITH STAGE 3B CHRONIC KIDNEY DISEASE, WITH LONG-TERM CURRENT USE OF INSULIN (H): ICD-10-CM

## 2023-07-03 DIAGNOSIS — Z79.4 TYPE 2 DIABETES MELLITUS WITH STAGE 3B CHRONIC KIDNEY DISEASE, WITH LONG-TERM CURRENT USE OF INSULIN (H): ICD-10-CM

## 2023-07-03 DIAGNOSIS — E11.22 TYPE 2 DIABETES MELLITUS WITH STAGE 3B CHRONIC KIDNEY DISEASE, WITH LONG-TERM CURRENT USE OF INSULIN (H): ICD-10-CM

## 2023-07-04 RX ORDER — INSULIN GLARGINE 100 [IU]/ML
62 INJECTION, SOLUTION SUBCUTANEOUS AT BEDTIME
Qty: 60 ML | Refills: 3 | Status: SHIPPED | OUTPATIENT
Start: 2023-07-04 | End: 2024-05-15

## 2023-07-13 ENCOUNTER — THERAPY VISIT (OUTPATIENT)
Dept: PHYSICAL THERAPY | Facility: CLINIC | Age: 83
End: 2023-07-13
Attending: PHYSICAL MEDICINE & REHABILITATION
Payer: COMMERCIAL

## 2023-07-13 DIAGNOSIS — M54.50 ACUTE MIDLINE LOW BACK PAIN WITHOUT SCIATICA: ICD-10-CM

## 2023-07-13 PROCEDURE — 97110 THERAPEUTIC EXERCISES: CPT | Mod: GP | Performed by: PHYSICAL THERAPIST

## 2023-07-13 PROCEDURE — 97161 PT EVAL LOW COMPLEX 20 MIN: CPT | Mod: GP | Performed by: PHYSICAL THERAPIST

## 2023-07-13 NOTE — PROGRESS NOTES
PHYSICAL THERAPY EVALUATION  Type of Visit: Evaluation pt injured back  23when he tried to open a door while carrying boxes. His hand slipped off the handle while pulling on the door and he fell. Pt referred for physical therapy on 23    See electronic medical record for Abuse and Falls Screening details.    Subjective      Presenting condition or subjective complaint: Lower back pain  Date of onset: 23    Relevant medical history: Chest pain; Diabetes; Heart problems; High blood pressure; Kidney disease; Significant weakness; Vision problems   Dates & types of surgery: Gall bladder 2022    Prior diagnostic imaging/testing results:       Prior therapy history for the same diagnosis, illness or injury: No      Prior Level of Function   Transfers: Independent  Ambulation: Independent  ADL: Independent  IADL: Finances, Housekeeping    Living Environment  Social support: With family members   Type of home: Apartment/condo   Stairs to enter the home: Yes 20 Is there a railing: Yes   Ramp: No   Stairs inside the home: Yes 20 Is there a railing: Yes   Help at home: None  Equipment owned:       Employment: No    Hobbies/Interests: Reading    Patient goals for therapy: Move around with out concern for my back    Pain assessment: Pain present  Location: midline lumbar /Ratin     Objective  LUMBAR:    Posture: decreased lordosis    Gait:     SL Balance:  R:  sec (R/L hip drop)  L:  sec (R/L hip drop)    Functional:  - Squat/ SL squat     Neurological:    Motor Deficit:  Myotomes L R   L1-2 (hip flexion) 5 5   L3 (knee extension) 5 5   L4 (ankle DF) 5 5   L5 (g. toe ext) 5 5   S1 (ankle PF or knee flex) 55 5     Sensory Deficit, Reflexes: patella 1+ bilateral , ankle 0 bilateral     Dural Signs:   L R   Slump     SLR - -       AROM: (Major, Moderate, Minimal or Nil loss)  Movement Loss Jaden Mod Min Nil Pain   Flexion   x     Extension  x   x   Side Gliding/Bend L   x     Side Gliding/Bend R   x            Lumbar Mobility/Spring Testing:     Palpation: point tenderness L5 spinous processes    Other Tests:                 Assessment & Plan   CLINICAL IMPRESSIONS   Medical Diagnosis: acute midline lower back pain without sciatica    Treatment Diagnosis: back pain, decreased strength and mobility   Impression/Assessment: Patient is a 82 year old male with lumbar  complaints.  The following significant findings have been identified: Pain, Decreased ROM/flexibility and Decreased strength. These impairments interfere with their ability to perform self care tasks, recreational activities, household chores, driving , household mobility and community mobility as compared to previous level of function.     Clinical Decision Making (Complexity):   Clinical Presentation: Stable/Uncomplicated  Clinical Presentation Rationale: based on medical and personal factors listed in PT evaluation  Clinical Decision Making (Complexity): Low complexity    PLAN OF CARE  Treatment Interventions:  Modalities: Cryotherapy  Interventions: Therapeutic Exercise    Long Term Goals     PT Goal 1  Goal Identifier: ambulation  Goal Description: pt able to ambulate 1/2 mile  Rationale: to maximize safety and independence with performance of ADLs and functional tasks;to maximize safety and independence within the home;to maximize safety and independence within the community;to maximize safety and independence with transportation;to maximize safety and independence with self cares  Target Date: 09/21/23      Frequency of Treatment: 1x/week  Duration of Treatment: 10 weeks    Recommended Referrals to Other Professionals:   Education Assessment:   Learner/Method: No Barriers to Learning    Risks and benefits of evaluation/treatment have been explained.   Patient/Family/caregiver agrees with Plan of Care.     Evaluation Time:            Signing Clinician: Demarcus Stanton PT      [facility]                                                                                    OUTPATIENT PHYSICAL THERAPY      PLAN OF TREATMENT FOR OUTPATIENT REHABILITATION   Patient's Last Name, First Name, Pascual Palm YOB: 1940   Provider's Name   [facility]   Medical Record No.  [id number]     Onset Date: 04/04/23  Start of Care Date:       Medical Diagnosis:  acute midline lower back pain without sciatica      PT Treatment Diagnosis:  back pain, decreased strength and mobility Plan of Treatment  Frequency/Duration: 1x/week/ 10 weeks    Certification date from   to           See note for plan of treatment details and functional goals     Demarcus Stanton, PT                         I CERTIFY THE NEED FOR THESE SERVICES FURNISHED UNDER        THIS PLAN OF TREATMENT AND WHILE UNDER MY CARE     (Physician attestation of this document indicates review and certification of the therapy plan).                  Referring Provider:  Abiodun Nash      Initial Assessment  See Epic Evaluation-

## 2023-07-20 ENCOUNTER — THERAPY VISIT (OUTPATIENT)
Dept: PHYSICAL THERAPY | Facility: CLINIC | Age: 83
End: 2023-07-20
Payer: COMMERCIAL

## 2023-07-20 DIAGNOSIS — M54.50 ACUTE MIDLINE LOW BACK PAIN WITHOUT SCIATICA: Primary | ICD-10-CM

## 2023-07-20 PROCEDURE — 97110 THERAPEUTIC EXERCISES: CPT | Mod: GP | Performed by: PHYSICAL THERAPIST

## 2023-07-27 ENCOUNTER — THERAPY VISIT (OUTPATIENT)
Dept: PHYSICAL THERAPY | Facility: CLINIC | Age: 83
End: 2023-07-27
Payer: COMMERCIAL

## 2023-07-27 DIAGNOSIS — M54.50 ACUTE MIDLINE LOW BACK PAIN WITHOUT SCIATICA: Primary | ICD-10-CM

## 2023-07-27 PROCEDURE — 97110 THERAPEUTIC EXERCISES: CPT | Mod: GP | Performed by: PHYSICAL THERAPIST

## 2023-08-03 ENCOUNTER — THERAPY VISIT (OUTPATIENT)
Dept: PHYSICAL THERAPY | Facility: CLINIC | Age: 83
End: 2023-08-03
Payer: COMMERCIAL

## 2023-08-03 DIAGNOSIS — M54.50 ACUTE MIDLINE LOW BACK PAIN WITHOUT SCIATICA: Primary | ICD-10-CM

## 2023-08-03 PROCEDURE — 97110 THERAPEUTIC EXERCISES: CPT | Mod: GP | Performed by: PHYSICAL THERAPIST

## 2023-08-08 DIAGNOSIS — E03.9 HYPOTHYROIDISM, UNSPECIFIED TYPE: ICD-10-CM

## 2023-08-08 RX ORDER — LEVOTHYROXINE SODIUM 112 UG/1
112 TABLET ORAL DAILY
Qty: 90 TABLET | Refills: 0 | Status: SHIPPED | OUTPATIENT
Start: 2023-08-08 | End: 2023-12-28

## 2023-08-08 NOTE — TELEPHONE ENCOUNTER
Prescription approved per [facility] Refill Protocol.  Qi Turcios, RN  [facility] Triage Nurse   Detail Level: Detailed Isotretinoin Pregnancy And Lactation Text: This medication is Pregnancy Category X and is considered extremely dangerous during pregnancy. It is unknown if it is excreted in breast milk. Topical Retinoid counseling:  Patient advised to apply a pea-sized amount only at bedtime and wait 30 minutes after washing their face before applying.  If too drying, patient may add a non-comedogenic moisturizer. The patient verbalized understanding of the proper use and possible adverse effects of retinoids.  All of the patient's questions and concerns were addressed. Tetracycline Counseling: Patient counseled regarding possible photosensitivity and increased risk for sunburn.  Patient instructed to avoid sunlight, if possible.  When exposed to sunlight, patients should wear protective clothing, sunglasses, and sunscreen.  The patient was instructed to call the office immediately if the following severe adverse effects occur:  hearing changes, easy bruising/bleeding, severe headache, or vision changes.  The patient verbalized understanding of the proper use and possible adverse effects of tetracycline.  All of the patient's questions and concerns were addressed. Patient understands to avoid pregnancy while on therapy due to potential birth defects. Use Enhanced Medication Counseling?: No Doxycycline Counseling:  Patient counseled regarding possible photosensitivity and increased risk for sunburn.  Patient instructed to avoid sunlight, if possible.  When exposed to sunlight, patients should wear protective clothing, sunglasses, and sunscreen.  The patient was instructed to call the office immediately if the following severe adverse effects occur:  hearing changes, easy bruising/bleeding, severe headache, or vision changes.  The patient verbalized understanding of the proper use and possible adverse effects of doxycycline.  All of the patient's questions and concerns were addressed. Spironolactone Pregnancy And Lactation Text: This medication can cause feminization of the male fetus and should be avoided during pregnancy. The active metabolite is also found in breast milk. Birth Control Pills Counseling: Birth Control Pill Counseling: I discussed with the patient the potential side effects of OCPs including but not limited to increased risk of stroke, heart attack, thrombophlebitis, deep venous thrombosis, hepatic adenomas, breast changes, GI upset, headaches, and depression.  The patient verbalized understanding of the proper use and possible adverse effects of OCPs. All of the patient's questions and concerns were addressed. Minocycline Pregnancy And Lactation Text: This medication is Pregnancy Category D and not consider safe during pregnancy. It is also excreted in breast milk. Topical Clindamycin Counseling: Patient counseled that this medication may cause skin irritation or allergic reactions.  In the event of skin irritation, the patient was advised to reduce the amount of the drug applied or use it less frequently.   The patient verbalized understanding of the proper use and possible adverse effects of clindamycin.  All of the patient's questions and concerns were addressed. Detail Level: Zone Birth Control Pills Pregnancy And Lactation Text: This medication should be avoided if pregnant and for the first 30 days post-partum. Azithromycin Pregnancy And Lactation Text: This medication is considered safe during pregnancy and is also secreted in breast milk. Topical Sulfur Applications Counseling: Topical Sulfur Counseling: Patient counseled that this medication may cause skin irritation or allergic reactions.  In the event of skin irritation, the patient was advised to reduce the amount of the drug applied or use it less frequently.   The patient verbalized understanding of the proper use and possible adverse effects of topical sulfur application.  All of the patient's questions and concerns were addressed. Sarecycline Counseling: Patient advised regarding possible photosensitivity and discoloration of the teeth, skin, lips, tongue and gums.  Patient instructed to avoid sunlight, if possible.  When exposed to sunlight, patients should wear protective clothing, sunglasses, and sunscreen.  The patient was instructed to call the office immediately if the following severe adverse effects occur:  hearing changes, easy bruising/bleeding, severe headache, or vision changes.  The patient verbalized understanding of the proper use and possible adverse effects of sarecycline.  All of the patient's questions and concerns were addressed. Azithromycin Counseling:  I discussed with the patient the risks of azithromycin including but not limited to GI upset, allergic reaction, drug rash, diarrhea, and yeast infections. Topical Clindamycin Pregnancy And Lactation Text: This medication is Pregnancy Category B and is considered safe during pregnancy. It is unknown if it is excreted in breast milk. High Dose Vitamin A Counseling: Side effects reviewed, pt to contact office should one occur. Topical Retinoid Pregnancy And Lactation Text: This medication is Pregnancy Category C. It is unknown if this medication is excreted in breast milk. Doxycycline Pregnancy And Lactation Text: This medication is Pregnancy Category D and not consider safe during pregnancy. It is also excreted in breast milk but is considered safe for shorter treatment courses. Bactrim Counseling:  I discussed with the patient the risks of sulfa antibiotics including but not limited to GI upset, allergic reaction, drug rash, diarrhea, dizziness, photosensitivity, and yeast infections.  Rarely, more serious reactions can occur including but not limited to aplastic anemia, agranulocytosis, methemoglobinemia, blood dyscrasias, liver or kidney failure, lung infiltrates or desquamative/blistering drug rashes. High Dose Vitamin A Pregnancy And Lactation Text: High dose vitamin A therapy is contraindicated during pregnancy and breast feeding. Benzoyl Peroxide Counseling: Patient counseled that medicine may cause skin irritation and bleach clothing.  In the event of skin irritation, the patient was advised to reduce the amount of the drug applied or use it less frequently.   The patient verbalized understanding of the proper use and possible adverse effects of benzoyl peroxide.  All of the patient's questions and concerns were addressed. Erythromycin Pregnancy And Lactation Text: This medication is Pregnancy Category B and is considered safe during pregnancy. It is also excreted in breast milk. Spironolactone Counseling: Patient advised regarding risks of diarrhea, abdominal pain, hyperkalemia, birth defects (for female patients), liver toxicity and renal toxicity. The patient may need blood work to monitor liver and kidney function and potassium levels while on therapy. The patient verbalized understanding of the proper use and possible adverse effects of spironolactone.  All of the patient's questions and concerns were addressed. Topical Sulfur Applications Pregnancy And Lactation Text: This medication is Pregnancy Category C and has an unknown safety profile during pregnancy. It is unknown if this topical medication is excreted in breast milk. Tazorac Counseling:  Patient advised that medication is irritating and drying.  Patient may need to apply sparingly and wash off after an hour before eventually leaving it on overnight.  The patient verbalized understanding of the proper use and possible adverse effects of tazorac.  All of the patient's questions and concerns were addressed. Erythromycin Counseling:  I discussed with the patient the risks of erythromycin including but not limited to GI upset, allergic reaction, drug rash, diarrhea, increase in liver enzymes, and yeast infections. Dapsone Counseling: I discussed with the patient the risks of dapsone including but not limited to hemolytic anemia, agranulocytosis, rashes, methemoglobinemia, kidney failure, peripheral neuropathy, headaches, GI upset, and liver toxicity.  Patients who start dapsone require monitoring including baseline LFTs and weekly CBCs for the first month, then every month thereafter.  The patient verbalized understanding of the proper use and possible adverse effects of dapsone.  All of the patient's questions and concerns were addressed. Bactrim Pregnancy And Lactation Text: This medication is Pregnancy Category D and is known to cause fetal risk.  It is also excreted in breast milk. Minocycline Counseling: Patient advised regarding possible photosensitivity and discoloration of the teeth, skin, lips, tongue and gums.  Patient instructed to avoid sunlight, if possible.  When exposed to sunlight, patients should wear protective clothing, sunglasses, and sunscreen.  The patient was instructed to call the office immediately if the following severe adverse effects occur:  hearing changes, easy bruising/bleeding, severe headache, or vision changes.  The patient verbalized understanding of the proper use and possible adverse effects of minocycline.  All of the patient's questions and concerns were addressed. Isotretinoin Counseling: Patient should get monthly blood tests, not donate blood, not drive at night if vision affected, not share medication, and not undergo elective surgery for 6 months after tx completed. Side effects reviewed, pt to contact office should one occur. Tazorac Pregnancy And Lactation Text: This medication is not safe during pregnancy. It is unknown if this medication is excreted in breast milk. Benzoyl Peroxide Pregnancy And Lactation Text: This medication is Pregnancy Category C. It is unknown if benzoyl peroxide is excreted in breast milk. Dapsone Pregnancy And Lactation Text: This medication is Pregnancy Category C and is not considered safe during pregnancy or breast feeding. Detail Level: Simple Detail Level: Generalized

## 2023-08-09 ENCOUNTER — THERAPY VISIT (OUTPATIENT)
Dept: PHYSICAL THERAPY | Facility: CLINIC | Age: 83
End: 2023-08-09
Payer: COMMERCIAL

## 2023-08-09 DIAGNOSIS — M54.50 ACUTE MIDLINE LOW BACK PAIN WITHOUT SCIATICA: Primary | ICD-10-CM

## 2023-08-09 PROCEDURE — 97110 THERAPEUTIC EXERCISES: CPT | Mod: GP | Performed by: PHYSICAL THERAPIST

## 2023-08-09 NOTE — PROGRESS NOTES
DISCHARGE  Reason for Discharge: Patient has met all goals.    Equipment Issued: none    Discharge Plan: Patient to continue home program.    Referring Provider:  Abiodun Nash

## 2023-08-28 DIAGNOSIS — E78.5 HYPERLIPIDEMIA LDL GOAL <70: ICD-10-CM

## 2023-08-28 RX ORDER — ATORVASTATIN CALCIUM 40 MG/1
40 TABLET, FILM COATED ORAL DAILY
Qty: 90 TABLET | Refills: 2 | Status: SHIPPED | OUTPATIENT
Start: 2023-08-28 | End: 2024-05-28

## 2023-09-08 ENCOUNTER — HOSPITAL ENCOUNTER (OUTPATIENT)
Dept: CARDIOLOGY | Facility: CLINIC | Age: 83
Discharge: HOME | End: 2023-09-08
Attending: INTERNAL MEDICINE | Admitting: INTERNAL MEDICINE
Payer: COMMERCIAL

## 2023-09-08 DIAGNOSIS — I48.91 ATRIAL FIBRILLATION, UNSPECIFIED TYPE (H): ICD-10-CM

## 2023-09-08 DIAGNOSIS — I25.119 ATHEROSCLEROSIS OF NATIVE CORONARY ARTERY OF NATIVE HEART WITH ANGINA PECTORIS (H): ICD-10-CM

## 2023-09-08 LAB — LVEF ECHO: NORMAL

## 2023-09-08 PROCEDURE — 93306 TTE W/DOPPLER COMPLETE: CPT | Mod: 26 | Performed by: INTERNAL MEDICINE

## 2023-09-08 PROCEDURE — 93306 TTE W/DOPPLER COMPLETE: CPT

## 2023-09-10 RX ORDER — APIXABAN 5 MG/1
5 TABLET, FILM COATED ORAL 2 TIMES DAILY
Qty: 180 TABLET | Refills: 3 | Status: SHIPPED | OUTPATIENT
Start: 2023-09-10 | End: 2023-09-11

## 2023-09-11 ENCOUNTER — OFFICE VISIT (OUTPATIENT)
Dept: CARDIOLOGY | Facility: CLINIC | Age: 83
End: 2023-09-11
Payer: COMMERCIAL

## 2023-09-11 ENCOUNTER — TELEPHONE (OUTPATIENT)
Dept: CARDIOLOGY | Facility: CLINIC | Age: 83
End: 2023-09-11

## 2023-09-11 VITALS
BODY MASS INDEX: 29.12 KG/M2 | SYSTOLIC BLOOD PRESSURE: 128 MMHG | HEIGHT: 71 IN | DIASTOLIC BLOOD PRESSURE: 68 MMHG | WEIGHT: 208 LBS | HEART RATE: 80 BPM

## 2023-09-11 DIAGNOSIS — I25.119 ATHEROSCLEROSIS OF NATIVE CORONARY ARTERY OF NATIVE HEART WITH ANGINA PECTORIS (H): ICD-10-CM

## 2023-09-11 DIAGNOSIS — I48.91 ATRIAL FIBRILLATION, UNSPECIFIED TYPE (H): ICD-10-CM

## 2023-09-11 PROCEDURE — 99214 OFFICE O/P EST MOD 30 MIN: CPT | Performed by: INTERNAL MEDICINE

## 2023-09-11 NOTE — LETTER
9/11/2023    Andrew Elizalde MD  [address] [facility] [address]    RE: Pascual Esteswrocki       Dear Colleague,     I had the pleasure of seeing Pascual Perea in the [facility].  HPI and Plan:   Mr Perea is a very pleasant 83-year-old gentleman with the history of CAD with history of CABG in 1997, atrial fibrillation flutter with history of cardioversion and patient was on amiodarone in the past but this was discontinued because of abnormal liver enzymes, on apixaban for stroke prophylaxis ,hospitalization for decompensated congestive heart failure in December 2021 in the setting of diuretics being discontinued a few months prior to admission due to hypercalcemia and patient was found to have congestive heart failure with moderately decreased RV systolic function with severe pulmonary hypertension, underwent diuresis and lost around 13 pounds of weight.  Patient also has history of sleep apnea diagnosed about 11 years ago and and started using CPAP since August 2022.  He also had a repeat echocardiogram done around the same time (August 2022) that showed LVEF of 60% with moderate LVH moderately dilated RV with moderately decreased RV systolic function with moderate pulm hypertension with RVSP of 48 mmHg plus RA with mild to moderate aortic stenosis.  To be noted echocardiogram done in December 2021 showed RVSP of 70 mg weekly plus RA.  He also had a Holter evaluation done that showed rhythm to be persistent atrial fibrillation with average ventricular rate of 80 bpm low of 61 high of 113 without any need for AV taylor blocking agent.   Patient underwent cardiac MRI October 2022 that did not reveal any inducible ischemia.  Normal LV systolic function, mildly dilated RV with normal RV systolic function with mild flattening of interventricular septum possibly from RV volume and pressure overload.  Some non CAD scarring in the septum with RV and attachment regions which could be seen with  pulmonary hypertension.  No evidence of infiltrative cardiomyopathy.  Patient was also seen in the ER around the same time (October 2022) because of weight gain of about 8 to 10 pounds with some shortness of breath and shortness of breath with exertion.  He was diagnosed with some decompressed congestive heart failure in the ER and his torsemide was increased and did well after that and is now on 20 mg daily of torsemide.  He has done well from cardiac standpoint of view since that time.  Today patient is coming to the clinic.  He had a repeat echocardiogram recently that showed normal LV and RV systolic function, normal RV size, mild aortic valve stenosis, underlying rhythm to be atrial fibrillation.  He has chronic kidney disease with creatinine above 1.5 for the last few times its been checked.  He tries to walk at least quarter to half a mile every day which involves climbing stairs up and down, no exertional symptoms or chest discomfort shortness with dizziness presyncope syncope        Assessment plan  1.  History of preserved ejection fraction congestive heart failure with history of right-sided congestive heart failure.  Now appears euvolemic compensated on torsemide.  Latest echocardiogram showing normal LV function normal RV size and systolic function.  Mild LVH on echocardiogram.   No evidence of infiltrative cardiomyopathy on cardiac MRI.   2.  History of severe pulmonary hypertension.    Was initially noted in the setting of volume overload and untreated sleep apnea.  There was significant RV enlargement and dysfunction both of this has improved to normal on recent echocardiogram.   3.  Chronic atrial fibrillation flutter with, ventricular rates well controlled without any need for AV taylor blocking agents. Holter showed well-controlled ventricular rates with rhythm to be atrial fibrillation.  No dizziness presyncope syncope Was on amiodarone in the past but this was discontinued because of abnormal  liver enzymes.  Chads 2 Vascor of at least 6.  On apixaban for stroke prophylaxis.   Discussed with patient appropriate dosing of apixaban.  With creatinine above 1.5 age above 80, 2.5 mg twice daily is recommended recommend switching apixaban from 5 mg twice daily to 2.5 mg twice daily  4. CAD with history of CABG in 1997.  Stress test in June 2021 showing small apical infarct.  Clinically no anginal symptoms.  On high intensity statin LDL well controlled.  Cardiac MRI stress perfusion in October 2022 with no evidence of inducible ischemia.  On apixaban, high intensity statin, LDL well controlled, also on fenofibrate for hypertriglyceridemia, triglycerides 239.  5.  Obstructive sleep apnea.    On CPAP with significant improvement in energy level while using CPAP and feeling more refreshed after sleep now.  6.  Mild aortic valve stenosis  7.  Chronic kidney disease stage III.   Creatinine above 1.5      Recommendations  Decrease apixaban to 2.5 mg twice daily  Continue high intensity statin Lipitor torsemide 20 mg daily  Follow-up in a year, sooner if he notes any change in clinical status.         Today's clinic visit entailed:  Review of the result(s) of each unique test - echo, bmp        Orders Placed This Encounter   Procedures    Follow-Up with Cardiology       Orders Placed This Encounter   Medications    apixaban ANTICOAGULANT (ELIQUIS ANTICOAGULANT) 2.5 MG tablet     Sig: Take 1 tablet (2.5 mg) by mouth 2 times daily     Dispense:  180 tablet     Refill:  3       Medications Discontinued During This Encounter   Medication Reason    apixaban ANTICOAGULANT (ELIQUIS) 5 MG tablet     apixaban ANTICOAGULANT (ELIQUIS ANTICOAGULANT) 5 MG tablet Reorder (No AVS)         Encounter Diagnoses   Name Primary?    Atherosclerosis of native coronary artery of native heart with angina pectoris (H)     Atrial fibrillation, unspecified type (H)        CURRENT MEDICATIONS:  Current Outpatient Medications   Medication Sig  Dispense Refill    apixaban ANTICOAGULANT (ELIQUIS ANTICOAGULANT) 2.5 MG tablet Take 1 tablet (2.5 mg) by mouth 2 times daily 180 tablet 3    atorvastatin (LIPITOR) 40 MG tablet TAKE 1 TABLET (40 MG) BY MOUTH DAILY 90 tablet 2    azelastine (ASTELIN) 0.1 % nasal spray USE 2 SPRAYS INTO BOTH NOSTRILS 2 TIMES A DAY 90 mL 3    blood glucose (ACCU-CHEK NORMAN PLUS) test strip USE TO TEST BLOOD SUGAR 3 TIMES A DAY OR AS DIRECTED 100 strip 11    blood glucose calibration (ACCU-CHEK NORMAN) solution USE AS DIRECTED 1 each 0    Continuous Blood Gluc  (FREESTYLE DELFINA 2 READER) CHRYSTAL 1 Device continuous 1 each 1    Continuous Blood Gluc Sensor (FREESTYLE DELFINA 2 SENSOR) [facility] APPLY 1 SENSOR AND CHANGE EVERY 14 DAYS AS DIRECTED 6 each 3    fenofibrate micronized (LOFIBRA) 67 MG capsule TAKE 1 CAPSULE BY MOUTH EVERY MORNING BEFORE BREAKFAST 90 capsule 3    glucose 4 g CHEW Take 1 tablet by mouth every hour as needed for low blood sugar       insulin aspart (NOVOLOG PEN) 100 UNIT/ML pen INJECT UNDER THE SKIN 3 TIMES PER DAY (BEFORE MEALS) 3 UNITS PER CARB CHOICE (15GM) APPROX  4-8 UNITS/MEAL PLUS PRIMING 15 mL     Insulin Aspart FlexPen 100 UNIT/ML SOPN INJECT UNDER THE SKIN 3 TIMES PER DAY (BEFORE MEALS) 3 UNITS PER CARB CHOICE (15GM) APPROX 10-15 UNITS/MEAL PLUS PRIMING 45 mL 3    insulin glargine (LANTUS SOLOSTAR) 100 UNIT/ML pen Inject 62 Units Subcutaneous At Bedtime 60 mL 3    insulin pen needle (NOVOFINE AUTOCOVER PEN NEEDLE) 30G X 8 MM miscellaneous USE AS DIRECTED FOUR TIMES A  each 0    levothyroxine (SYNTHROID/LEVOTHROID) 112 MCG tablet TAKE 1 TABLET BY MOUTH ONCE DAILY 90 tablet 0    nitroGLYcerin (NITROSTAT) 0.4 MG sublingual tablet Place 1 tablet (0.4 mg) under the tongue every 5 minutes as needed for chest pain 25 tablet 3    torsemide (DEMADEX) 10 MG tablet Take 2 tablets (20 mg) by mouth daily 180 tablet 3       ALLERGIES     Allergies   Allergen Reactions    Omeprazole      diarrhea    Pantoprazole       diarrhea       PAST MEDICAL HISTORY:  Past Medical History:   Diagnosis Date    Adhesive capsulitis of shoulder     Anemia 03/10/2014    Anemia, unspecified     Antiplatelet or antithrombotic long-term use     Arrhythmia     Atrial fib/flutter    CAD (coronary artery disease)     Gastroparesis     delayed emptying study May 2022    History of cardioversion 04/24/2018    a single synchronized shock of 120 joules restored normal sinus rhythm    History of cardioversion 02/13/2019    single shock , 200 joules successul in restoring NSR    Hyperlipidemia LDL goal <70 05/26/2011    Hypertension     Hypothyroidism     Impotence of organic origin     Laceration of finger 06/2010     left thumb s/p sutures    Numbness and tingling     diabetic neuropathy bilateral feet    BRANDON (obstructive sleep apnea)     intolerant of CPAP, uses it occasionally.  Using mandibular device occasionally    Osteopenia     Pulmonary hypertension (H) 04/06/2012    Sensorineural hearing loss, unspecified     Stented coronary artery 1997    CABG     Testosterone deficiency     Type 2 diabetes mellitus without complication  (goal A1C<8) 10/24/2015    Typical atrial flutter (H) 04/18/2016    Unspecified hypothyroidism     Vitamin D deficiency 09/03/2011       PAST SURGICAL HISTORY:  Past Surgical History:   Procedure Laterality Date    ANESTHESIA CARDIOVERSION N/A 02/13/2019    Procedure: ANESTHESIA CARDIOVERSION;  Surgeon: GENERIC ANESTHESIA PROVIDER;  Location:  OR    ANESTHESIA CARDIOVERSION N/A 05/22/2019    Procedure: ANESTHESIA, FOR CARDIOVERSION;  Surgeon: GENERIC ANESTHESIA PROVIDER;  Location:  OR    CARDIAC SURGERY      bypass in 1997    CARDIOVERSION  04/24/2018    COLONOSCOPY      CORONARY ANGIOGRAPHY ADULT ORDER      4/2/2012    CORONARY ARTERY BYPASS  1997    [facility]    ENDOSCOPIC ULTRASOUND UPPER GASTROINTESTINAL TRACT (GI) N/A 03/14/2019    Procedure: ENDOSCOPIC ULTRASOUND OF UPPER GASTROINTESTINAL  TRACT;  Surgeon: Ju Torres MD;  Location:  OR    ESOPHAGOSCOPY, GASTROSCOPY, DUODENOSCOPY (EGD), COMBINED N/A 4/15/2022    Procedure: ESOPHAGOGASTRODUODENOSCOPY (EGD);  Surgeon: Erik Baca DO;  Location:  GI    EXCISE MASS HEAD Left 08/18/2016    Procedure: EXCISE MASS HEAD;  Surgeon: Ivan Hernandez MD;  Location: [facility]    HEART CATH, ANGIOPLASTY  04/2012    LAPAROSCOPIC CHOLECYSTECTOMY N/A 03/17/2019    Procedure: LAPAROSCOPIC CHOLECYSTECTOMY;  Surgeon: Janel Paz MD;  Location:  OR    PHACOEMULSIFICATION CLEAR CORNEA WITH STANDARD INTRAOCULAR LENS IMPLANT Left 06/08/2015    Procedure: PHACOEMULSIFICATION CLEAR CORNEA WITH STANDARD INTRAOCULAR LENS IMPLANT;  Surgeon: Nixon Farnsworth MD;  Location:  EC    PHACOEMULSIFICATION CLEAR CORNEA WITH STANDARD INTRAOCULAR LENS IMPLANT Right 06/22/2015    Procedure: PHACOEMULSIFICATION CLEAR CORNEA WITH STANDARD INTRAOCULAR LENS IMPLANT;  Surgeon: Nixon Farnsworth MD;  Location:  EC    SEPTOPLASTY, TURBINOPLASTY, COMBINED Bilateral 08/18/2016    Procedure: COMBINED SEPTOPLASTY, TURBINOPLASTY;  Surgeon: Ivan Hernandez MD;  Location: [facility]    ZZC NONSPECIFIC PROCEDURE  1997    CABG ([facility])    Z NONSPECIFIC PROCEDURE  08/2001    stress echo       FAMILY HISTORY:  Family History   Problem Relation Age of Onset    Genitourinary Problems Father         d: age 89; ARF    Hypertension Father     Diabetes Mother         d: age 68, CAD    Heart Disease Mother         MI    Myocardial Infarction Mother     Cardiovascular Brother         d:  age 31; CAD    Heart Disease Brother         age 31, MI    Diabetes Sister         b:  1939; DM2    Diabetes Sister        SOCIAL HISTORY:  Social History     Socioeconomic History    Marital status:      Spouse name: None    Number of children: None    Years of education: None    Highest education level: None   Tobacco Use    Smoking status: Former     Packs/day: 1.00      "Years: 6.00     Pack years: 6.00     Types: Cigarettes, Cigars, Pipe     Start date:      Quit date: 1964     Years since quittin.2    Smokeless tobacco: Never   Substance and Sexual Activity    Alcohol use: Not Currently     Comment: couple drinks a year    Drug use: No    Sexual activity: Yes     Partners: Female     Birth control/protection: Abstinence   Other Topics Concern    Parent/sibling w/ CABG, MI or angioplasty before 65F 55M? No    Caffeine Concern Yes     Comment: 2 cups coffee a day    Sleep Concern Yes     Comment: sleep apnea, wears cpap off and on    Stress Concern No    Weight Concern No    Special Diet Yes     Comment: diabetic diet     Exercise No     Comment: occ walk the mall    Seat Belt Yes       Review of Systems:  Skin:          Eyes:         ENT:         Respiratory: Longstanding mild shortness of breath exertion which is stable nonprogressive  Cardiovascular:    fatigue;Positive for    Gastroenterology:        Genitourinary:         Musculoskeletal:         Neurologic:         Psychiatric:         Heme/Lymph/Imm:         Endocrine:  Positive for thyroid disorder;diabetes      Physical Exam:  Vitals: /68   Pulse 80   Ht 1.803 m (5' 11\")   Wt 94.3 kg (208 lb)   BMI 29.01 kg/m      General patient appears comfortable  Neck normal JVP  Cardiovascular system grade 2 x 6 ejection systolic murmur with early peaking over the right upper sternal border, irregular, normal rate, no rub or gallop  Respiration clear to auscultation  Extremities no edema    CC  Phoenix Adams MD  [address]  [place] [address]                  Thank you for allowing me to participate in the care of your patient.      Sincerely,     Phoenix Adams MD     [facility]  "

## 2023-09-11 NOTE — PROGRESS NOTES
HPI and Plan:   Mr Perea is a very pleasant 83-year-old gentleman with the history of CAD with history of CABG in 1997, atrial fibrillation flutter with history of cardioversion and patient was on amiodarone in the past but this was discontinued because of abnormal liver enzymes, on apixaban for stroke prophylaxis ,hospitalization for decompensated congestive heart failure in December 2021 in the setting of diuretics being discontinued a few months prior to admission due to hypercalcemia and patient was found to have congestive heart failure with moderately decreased RV systolic function with severe pulmonary hypertension, underwent diuresis and lost around 13 pounds of weight.  Patient also has history of sleep apnea diagnosed about 11 years ago and and started using CPAP since August 2022.  He also had a repeat echocardiogram done around the same time (August 2022) that showed LVEF of 60% with moderate LVH moderately dilated RV with moderately decreased RV systolic function with moderate pulm hypertension with RVSP of 48 mmHg plus RA with mild to moderate aortic stenosis.  To be noted echocardiogram done in December 2021 showed RVSP of 70 mg weekly plus RA.  He also had a Holter evaluation done that showed rhythm to be persistent atrial fibrillation with average ventricular rate of 80 bpm low of 61 high of 113 without any need for AV taylor blocking agent.   Patient underwent cardiac MRI October 2022 that did not reveal any inducible ischemia.  Normal LV systolic function, mildly dilated RV with normal RV systolic function with mild flattening of interventricular septum possibly from RV volume and pressure overload.  Some non CAD scarring in the septum with RV and attachment regions which could be seen with pulmonary hypertension.  No evidence of infiltrative cardiomyopathy.  Patient was also seen in the ER around the same time (October 2022) because of weight gain of about 8 to 10 pounds with some shortness of  breath and shortness of breath with exertion.  He was diagnosed with some decompressed congestive heart failure in the ER and his torsemide was increased and did well after that and is now on 20 mg daily of torsemide.  He has done well from cardiac standpoint of view since that time.  Today patient is coming to the clinic.  He had a repeat echocardiogram recently that showed normal LV and RV systolic function, normal RV size, mild aortic valve stenosis, underlying rhythm to be atrial fibrillation.  He has chronic kidney disease with creatinine above 1.5 for the last few times its been checked.  He tries to walk at least quarter to half a mile every day which involves climbing stairs up and down, no exertional symptoms or chest discomfort shortness with dizziness presyncope syncope        Assessment plan  1.  History of preserved ejection fraction congestive heart failure with history of right-sided congestive heart failure.  Now appears euvolemic compensated on torsemide.  Latest echocardiogram showing normal LV function normal RV size and systolic function.  Mild LVH on echocardiogram.   No evidence of infiltrative cardiomyopathy on cardiac MRI.   2.  History of severe pulmonary hypertension.    Was initially noted in the setting of volume overload and untreated sleep apnea.  There was significant RV enlargement and dysfunction both of this has improved to normal on recent echocardiogram.   3.  Chronic atrial fibrillation flutter with, ventricular rates well controlled without any need for AV taylor blocking agents. Holter showed well-controlled ventricular rates with rhythm to be atrial fibrillation.  No dizziness presyncope syncope Was on amiodarone in the past but this was discontinued because of abnormal liver enzymes.  Chads 2 Vascor of at least 6.  On apixaban for stroke prophylaxis.   Discussed with patient appropriate dosing of apixaban.  With creatinine above 1.5 age above 80, 2.5 mg twice daily is  recommended recommend switching apixaban from 5 mg twice daily to 2.5 mg twice daily  4. CAD with history of CABG in 1997.  Stress test in June 2021 showing small apical infarct.  Clinically no anginal symptoms.  On high intensity statin LDL well controlled.  Cardiac MRI stress perfusion in October 2022 with no evidence of inducible ischemia.  On apixaban, high intensity statin, LDL well controlled, also on fenofibrate for hypertriglyceridemia, triglycerides 239.  5.  Obstructive sleep apnea.    On CPAP with significant improvement in energy level while using CPAP and feeling more refreshed after sleep now.  6.  Mild aortic valve stenosis  7.  Chronic kidney disease stage III.   Creatinine above 1.5      Recommendations  Decrease apixaban to 2.5 mg twice daily  Continue high intensity statin Lipitor torsemide 20 mg daily  Follow-up in a year, sooner if he notes any change in clinical status.         Today's clinic visit entailed:  Review of the result(s) of each unique test - echo, bmp        Orders Placed This Encounter   Procedures    Follow-Up with Cardiology       Orders Placed This Encounter   Medications    apixaban ANTICOAGULANT (ELIQUIS ANTICOAGULANT) 2.5 MG tablet     Sig: Take 1 tablet (2.5 mg) by mouth 2 times daily     Dispense:  180 tablet     Refill:  3       Medications Discontinued During This Encounter   Medication Reason    apixaban ANTICOAGULANT (ELIQUIS) 5 MG tablet     apixaban ANTICOAGULANT (ELIQUIS ANTICOAGULANT) 5 MG tablet Reorder (No AVS)         Encounter Diagnoses   Name Primary?    Atherosclerosis of native coronary artery of native heart with angina pectoris (H)     Atrial fibrillation, unspecified type (H)        CURRENT MEDICATIONS:  Current Outpatient Medications   Medication Sig Dispense Refill    apixaban ANTICOAGULANT (ELIQUIS ANTICOAGULANT) 2.5 MG tablet Take 1 tablet (2.5 mg) by mouth 2 times daily 180 tablet 3    atorvastatin (LIPITOR) 40 MG tablet TAKE 1 TABLET (40 MG) BY  MOUTH DAILY 90 tablet 2    azelastine (ASTELIN) 0.1 % nasal spray USE 2 SPRAYS INTO BOTH NOSTRILS 2 TIMES A DAY 90 mL 3    blood glucose (ACCU-CHEK NORMAN PLUS) test strip USE TO TEST BLOOD SUGAR 3 TIMES A DAY OR AS DIRECTED 100 strip 11    blood glucose calibration (ACCU-CHEK NORMAN) solution USE AS DIRECTED 1 each 0    Continuous Blood Gluc  (FREESTYLE DELFINA 2 READER) CHRYSTAL 1 Device continuous 1 each 1    Continuous Blood Gluc Sensor (FREESTYLE DELFINA 2 SENSOR) [facility] [place] APPLY 1 SENSOR AND CHANGE EVERY 14 DAYS AS DIRECTED 6 each 3    fenofibrate micronized (LOFIBRA) 67 MG capsule TAKE 1 CAPSULE BY MOUTH EVERY MORNING BEFORE BREAKFAST 90 capsule 3    glucose 4 g CHEW Take 1 tablet by mouth every hour as needed for low blood sugar       insulin aspart (NOVOLOG PEN) 100 UNIT/ML pen INJECT UNDER THE SKIN 3 TIMES PER DAY (BEFORE MEALS) 3 UNITS PER CARB CHOICE (15GM) APPROX  4-8 UNITS/MEAL PLUS PRIMING 15 mL     Insulin Aspart FlexPen 100 UNIT/ML SOPN INJECT UNDER THE SKIN 3 TIMES PER DAY (BEFORE MEALS) 3 UNITS PER CARB CHOICE (15GM) APPROX 10-15 UNITS/MEAL PLUS PRIMING 45 mL 3    insulin glargine (LANTUS SOLOSTAR) 100 UNIT/ML pen Inject 62 Units Subcutaneous At Bedtime 60 mL 3    insulin pen needle (NOVOFINE AUTOCOVER PEN NEEDLE) 30G X 8 MM miscellaneous USE AS DIRECTED FOUR TIMES A  each 0    levothyroxine (SYNTHROID/LEVOTHROID) 112 MCG tablet TAKE 1 TABLET BY MOUTH ONCE DAILY 90 tablet 0    nitroGLYcerin (NITROSTAT) 0.4 MG sublingual tablet Place 1 tablet (0.4 mg) under the tongue every 5 minutes as needed for chest pain 25 tablet 3    torsemide (DEMADEX) 10 MG tablet Take 2 tablets (20 mg) by mouth daily 180 tablet 3       ALLERGIES     Allergies   Allergen Reactions    Omeprazole      diarrhea    Pantoprazole      diarrhea       PAST MEDICAL HISTORY:  Past Medical History:   Diagnosis Date    Adhesive capsulitis of shoulder     Anemia 03/10/2014    Anemia, unspecified     Antiplatelet or antithrombotic  long-term use     Arrhythmia     Atrial fib/flutter    CAD (coronary artery disease)     Gastroparesis     delayed emptying study May 2022    History of cardioversion 04/24/2018    a single synchronized shock of 120 joules restored normal sinus rhythm    History of cardioversion 02/13/2019    single shock , 200 joules successul in restoring NSR    Hyperlipidemia LDL goal <70 05/26/2011    Hypertension     Hypothyroidism     Impotence of organic origin     Laceration of finger 06/2010     left thumb s/p sutures    Numbness and tingling     diabetic neuropathy bilateral feet    BRANDON (obstructive sleep apnea)     intolerant of CPAP, uses it occasionally.  Using mandibular device occasionally    Osteopenia     Pulmonary hypertension (H) 04/06/2012    Sensorineural hearing loss, unspecified     Stented coronary artery 1997    CABG     Testosterone deficiency     Type 2 diabetes mellitus without complication  (goal A1C<8) 10/24/2015    Typical atrial flutter (H) 04/18/2016    Unspecified hypothyroidism     Vitamin D deficiency 09/03/2011       PAST SURGICAL HISTORY:  Past Surgical History:   Procedure Laterality Date    ANESTHESIA CARDIOVERSION N/A 02/13/2019    Procedure: ANESTHESIA CARDIOVERSION;  Surgeon: GENERIC ANESTHESIA PROVIDER;  Location:  OR    ANESTHESIA CARDIOVERSION N/A 05/22/2019    Procedure: ANESTHESIA, FOR CARDIOVERSION;  Surgeon: GENERIC ANESTHESIA PROVIDER;  Location:  OR    CARDIAC SURGERY      bypass in 1997    CARDIOVERSION  04/24/2018    COLONOSCOPY      CORONARY ANGIOGRAPHY ADULT ORDER      4/2/2012    CORONARY ARTERY BYPASS  1997    [facility]    ENDOSCOPIC ULTRASOUND UPPER GASTROINTESTINAL TRACT (GI) N/A 03/14/2019    Procedure: ENDOSCOPIC ULTRASOUND OF UPPER GASTROINTESTINAL TRACT;  Surgeon: Ju Torres MD;  Location:  OR    ESOPHAGOSCOPY, GASTROSCOPY, DUODENOSCOPY (EGD), COMBINED N/A 4/15/2022    Procedure: ESOPHAGOGASTRODUODENOSCOPY (EGD);  Surgeon:  Erik Baca, DO;  Location:  GI    EXCISE MASS HEAD Left 2016    Procedure: EXCISE MASS HEAD;  Surgeon: Ivan Hernandez MD;  Location: [facility]    HEART CATH, ANGIOPLASTY  2012    LAPAROSCOPIC CHOLECYSTECTOMY N/A 2019    Procedure: LAPAROSCOPIC CHOLECYSTECTOMY;  Surgeon: Janel Paz MD;  Location:  OR    PHACOEMULSIFICATION CLEAR CORNEA WITH STANDARD INTRAOCULAR LENS IMPLANT Left 2015    Procedure: PHACOEMULSIFICATION CLEAR CORNEA WITH STANDARD INTRAOCULAR LENS IMPLANT;  Surgeon: Nixon Farnsworth MD;  Location:  [facility]    PHACOEMULSIFICATION CLEAR CORNEA WITH STANDARD INTRAOCULAR LENS IMPLANT Right 2015    Procedure: PHACOEMULSIFICATION CLEAR CORNEA WITH STANDARD INTRAOCULAR LENS IMPLANT;  Surgeon: Nixon Farnsworth MD;  Location:  [facility]    SEPTOPLASTY, TURBINOPLASTY, COMBINED Bilateral 2016    Procedure: COMBINED SEPTOPLASTY, TURBINOPLASTY;  Surgeon: Ivan Hernandez MD;  Location: [facility]    ZZC NONSPECIFIC PROCEDURE      CABG ([facility])    Z NONSPECIFIC PROCEDURE  2001    stress echo       FAMILY HISTORY:  Family History   Problem Relation Age of Onset    Genitourinary Problems Father         d: age 89; ARF    Hypertension Father     Diabetes Mother         d: age 68, CAD    Heart Disease Mother         MI    Myocardial Infarction Mother     Cardiovascular Brother         d:  age 31; CAD    Heart Disease Brother         age 31, MI    Diabetes Sister         b:  1939; DM2    Diabetes Sister        SOCIAL HISTORY:  Social History     Socioeconomic History    Marital status:      Spouse name: None    Number of children: None    Years of education: None    Highest education level: None   Tobacco Use    Smoking status: Former     Packs/day: 1.00     Years: 6.00     Pack years: 6.00     Types: Cigarettes, Cigars, Pipe     Start date:      Quit date: 1964     Years since quittin.2    Smokeless tobacco: Never   Substance and Sexual  "Activity    Alcohol use: Not Currently     Comment: couple drinks a year    Drug use: No    Sexual activity: Yes     Partners: Female     Birth control/protection: Abstinence   Other Topics Concern    Parent/sibling w/ CABG, MI or angioplasty before 65F 55M? No    Caffeine Concern Yes     Comment: 2 cups coffee a day    Sleep Concern Yes     Comment: sleep apnea, wears cpap off and on    Stress Concern No    Weight Concern No    Special Diet Yes     Comment: diabetic diet     Exercise No     Comment: occ walk the mall    Seat Belt Yes       Review of Systems:  Skin:          Eyes:         ENT:         Respiratory: Longstanding mild shortness of breath exertion which is stable nonprogressive  Cardiovascular:    fatigue;Positive for    Gastroenterology:        Genitourinary:         Musculoskeletal:         Neurologic:         Psychiatric:         Heme/Lymph/Imm:         Endocrine:  Positive for thyroid disorder;diabetes      Physical Exam:  Vitals: /68   Pulse 80   Ht 1.803 m (5' 11\")   Wt 94.3 kg (208 lb)   BMI 29.01 kg/m      General patient appears comfortable  Neck normal JVP  Cardiovascular system grade 2 x 6 ejection systolic murmur with early peaking over the right upper sternal border, irregular, normal rate, no rub or gallop  Respiration clear to auscultation  Extremities no edema    DYAN Adams MD  [address]  [place] [address]                "

## 2023-09-11 NOTE — TELEPHONE ENCOUNTER
Team received results from Echocardiogram     The left ventricle is normal in size.  There is mild concentric left ventricular hypertrophy.  Left ventricular systolic function is normal.  The visual ejection fraction is 60-65%.  Mild valvular aortic stenosis.  Compared to prior study, 12\12\22, there is no significant change.    Routing to Dr. Adams for review.    Krystyna Prabhakar RN on 9/11/2023 at 2:15 PM

## 2023-10-04 ENCOUNTER — LAB (OUTPATIENT)
Dept: LAB | Facility: CLINIC | Age: 83
End: 2023-10-04
Attending: INTERNAL MEDICINE
Payer: COMMERCIAL

## 2023-10-04 DIAGNOSIS — N18.32 TYPE 2 DIABETES MELLITUS WITH STAGE 3B CHRONIC KIDNEY DISEASE, WITH LONG-TERM CURRENT USE OF INSULIN (H): ICD-10-CM

## 2023-10-04 DIAGNOSIS — Z79.4 TYPE 2 DIABETES MELLITUS WITH STAGE 3B CHRONIC KIDNEY DISEASE, WITH LONG-TERM CURRENT USE OF INSULIN (H): ICD-10-CM

## 2023-10-04 DIAGNOSIS — E11.22 TYPE 2 DIABETES MELLITUS WITH STAGE 3B CHRONIC KIDNEY DISEASE, WITH LONG-TERM CURRENT USE OF INSULIN (H): ICD-10-CM

## 2023-10-04 DIAGNOSIS — N18.32 STAGE 3B CHRONIC KIDNEY DISEASE (H): ICD-10-CM

## 2023-10-04 LAB — HBA1C MFR BLD: 7.7 % (ref 0–5.6)

## 2023-10-04 PROCEDURE — 36415 COLL VENOUS BLD VENIPUNCTURE: CPT

## 2023-10-04 PROCEDURE — 83036 HEMOGLOBIN GLYCOSYLATED A1C: CPT

## 2023-10-09 NOTE — PROGRESS NOTES
DISCHARGE  Reason for Discharge: pt has not returned for further treatment    Equipment Issued: none    Discharge Plan: Patient to continue home program.    Referring Provider:  Abiodun Nash

## 2023-10-11 DIAGNOSIS — E11.9 TYPE 2 DIABETES MELLITUS WITHOUT COMPLICATION, WITH LONG-TERM CURRENT USE OF INSULIN (H): ICD-10-CM

## 2023-10-11 DIAGNOSIS — Z79.4 TYPE 2 DIABETES MELLITUS WITHOUT COMPLICATION, WITH LONG-TERM CURRENT USE OF INSULIN (H): ICD-10-CM

## 2023-10-11 RX ORDER — PEN NEEDLE, DIABETIC 31 GX3/16"
NEEDLE, DISPOSABLE MISCELLANEOUS
Qty: 100 EACH | Refills: 0 | Status: SHIPPED | OUTPATIENT
Start: 2023-10-11 | End: 2023-11-06

## 2023-11-06 DIAGNOSIS — Z79.4 TYPE 2 DIABETES MELLITUS WITHOUT COMPLICATION, WITH LONG-TERM CURRENT USE OF INSULIN (H): ICD-10-CM

## 2023-11-06 DIAGNOSIS — E11.9 TYPE 2 DIABETES MELLITUS WITHOUT COMPLICATION, WITH LONG-TERM CURRENT USE OF INSULIN (H): ICD-10-CM

## 2023-11-06 RX ORDER — PEN NEEDLE, DIABETIC 31 GX3/16"
NEEDLE, DISPOSABLE MISCELLANEOUS
Qty: 100 EACH | Refills: 1 | Status: SHIPPED | OUTPATIENT
Start: 2023-11-06 | End: 2024-01-22

## 2023-11-21 DIAGNOSIS — I50.9 ACUTE ON CHRONIC CONGESTIVE HEART FAILURE, UNSPECIFIED HEART FAILURE TYPE (H): ICD-10-CM

## 2023-11-21 RX ORDER — TORSEMIDE 10 MG/1
20 TABLET ORAL DAILY
Qty: 180 TABLET | Refills: 3 | Status: SHIPPED | OUTPATIENT
Start: 2023-11-21

## 2023-11-27 ENCOUNTER — OFFICE VISIT (OUTPATIENT)
Dept: URGENT CARE | Facility: URGENT CARE | Age: 83
End: 2023-11-27
Payer: COMMERCIAL

## 2023-11-27 VITALS
OXYGEN SATURATION: 96 % | SYSTOLIC BLOOD PRESSURE: 128 MMHG | TEMPERATURE: 97.6 F | HEART RATE: 69 BPM | DIASTOLIC BLOOD PRESSURE: 80 MMHG | RESPIRATION RATE: 18 BRPM

## 2023-11-27 DIAGNOSIS — M54.50 CHRONIC BILATERAL LOW BACK PAIN WITHOUT SCIATICA: ICD-10-CM

## 2023-11-27 DIAGNOSIS — G89.29 CHRONIC BILATERAL LOW BACK PAIN WITHOUT SCIATICA: ICD-10-CM

## 2023-11-27 DIAGNOSIS — N18.32 STAGE 3B CHRONIC KIDNEY DISEASE (H): ICD-10-CM

## 2023-11-27 DIAGNOSIS — R35.0 URINARY FREQUENCY: Primary | ICD-10-CM

## 2023-11-27 DIAGNOSIS — I50.9 CONGESTIVE HEART FAILURE, UNSPECIFIED HF CHRONICITY, UNSPECIFIED HEART FAILURE TYPE (H): ICD-10-CM

## 2023-11-27 LAB
ALBUMIN UR-MCNC: NEGATIVE MG/DL
APPEARANCE UR: CLEAR
BILIRUB UR QL STRIP: NEGATIVE
COLOR UR AUTO: YELLOW
GLUCOSE UR STRIP-MCNC: NEGATIVE MG/DL
HGB UR QL STRIP: NEGATIVE
KETONES UR STRIP-MCNC: NEGATIVE MG/DL
LEUKOCYTE ESTERASE UR QL STRIP: NEGATIVE
NITRATE UR QL: NEGATIVE
PH UR STRIP: 5 [PH] (ref 5–7)
SP GR UR STRIP: <=1.005 (ref 1–1.03)
UROBILINOGEN UR STRIP-ACNC: 0.2 E.U./DL

## 2023-11-27 PROCEDURE — 99214 OFFICE O/P EST MOD 30 MIN: CPT | Performed by: NURSE PRACTITIONER

## 2023-11-27 PROCEDURE — 81003 URINALYSIS AUTO W/O SCOPE: CPT

## 2023-11-27 NOTE — PROGRESS NOTES
Chief Complaint   Patient presents with    Musculoskeletal Problem     Pt reports lower flank pain X 1 week. No known injury. Frequent urination.      SUBJECTIVE:  Pascual Perea is a 83 year old male who presents to the clinic today with mild 2/10 low midline back pain and urinary frequency over the last week.  He wanted to double check that this is not an infection.  Has a relevant past medical history of CKD low back pain followed by physical therapy on torsemide for CHF and type 2 diabetes.  Declines fever sweats chills nausea vomiting hematuria malodor.  He has been drinking an excess amount of coffee and less water lately.    Past Medical History:   Diagnosis Date    Adhesive capsulitis of shoulder     Anemia 03/10/2014    Anemia, unspecified     Antiplatelet or antithrombotic long-term use     Arrhythmia     Atrial fib/flutter    CAD (coronary artery disease)     Gastroparesis     delayed emptying study May 2022    History of cardioversion 04/24/2018    a single synchronized shock of 120 joules restored normal sinus rhythm    History of cardioversion 02/13/2019    single shock , 200 joules successul in restoring NSR    Hyperlipidemia LDL goal <70 05/26/2011    Hypertension     Hypothyroidism     Impotence of organic origin     Laceration of finger 06/2010     left thumb s/p sutures    Numbness and tingling     diabetic neuropathy bilateral feet    BRANDON (obstructive sleep apnea)     intolerant of CPAP, uses it occasionally.  Using mandibular device occasionally    Osteopenia     Pulmonary hypertension (H) 04/06/2012    Sensorineural hearing loss, unspecified     Stented coronary artery 1997    CABG     Testosterone deficiency     Type 2 diabetes mellitus without complication  (goal A1C<8) 10/24/2015    Typical atrial flutter (H) 04/18/2016    Unspecified hypothyroidism     Vitamin D deficiency 09/03/2011     apixaban ANTICOAGULANT (ELIQUIS ANTICOAGULANT) 2.5 MG tablet, Take 1 tablet (2.5 mg) by mouth 2 times  daily  atorvastatin (LIPITOR) 40 MG tablet, TAKE 1 TABLET (40 MG) BY MOUTH DAILY  azelastine (ASTELIN) 0.1 % nasal spray, USE 2 SPRAYS INTO BOTH NOSTRILS 2 TIMES A DAY  blood glucose (ACCU-CHEK NORMAN PLUS) test strip, USE TO TEST BLOOD SUGAR 3 TIMES A DAY OR AS DIRECTED  blood glucose calibration (ACCU-CHEK NORMAN) solution, USE AS DIRECTED  Continuous Blood Gluc  (FREESTYLE DELFINA 2 READER) CHRYSTAL, 1 Device continuous  Continuous Blood Gluc Sensor (FREESTYLE DELFINA 2 SENSOR) [facility] [place], APPLY 1 SENSOR AND CHANGE EVERY 14 DAYS AS DIRECTED  fenofibrate micronized (LOFIBRA) 67 MG capsule, TAKE 1 CAPSULE BY MOUTH EVERY MORNING BEFORE BREAKFAST  glucose 4 g CHEW, Take 1 tablet by mouth every hour as needed for low blood sugar   insulin aspart (NOVOLOG PEN) 100 UNIT/ML pen, INJECT UNDER THE SKIN 3 TIMES PER DAY (BEFORE MEALS) 3 UNITS PER CARB CHOICE (15GM) APPROX  4-8 UNITS/MEAL PLUS PRIMING  Insulin Aspart FlexPen 100 UNIT/ML SOPN, INJECT UNDER THE SKIN 3 TIMES PER DAY (BEFORE MEALS) 3 UNITS PER CARB CHOICE (15GM) APPROX 10-15 UNITS/MEAL PLUS PRIMING  insulin glargine (LANTUS SOLOSTAR) 100 UNIT/ML pen, Inject 62 Units Subcutaneous At Bedtime  insulin pen needle (GLOBAL EASE INJECT PEN NEEDLES) 31G X 5 MM miscellaneous, USE AS DIRECTED FOUR TIMES A DAY  levothyroxine (SYNTHROID/LEVOTHROID) 112 MCG tablet, TAKE 1 TABLET BY MOUTH ONCE DAILY  nitroGLYcerin (NITROSTAT) 0.4 MG sublingual tablet, Place 1 tablet (0.4 mg) under the tongue every 5 minutes as needed for chest pain  torsemide (DEMADEX) 10 MG tablet, Take 2 tablets (20 mg) by mouth daily    No current facility-administered medications on file prior to visit.    Social History     Tobacco Use    Smoking status: Former     Packs/day: 1.00     Years: 6.00     Additional pack years: 0.00     Total pack years: 6.00     Types: Cigarettes, Cigars, Pipe     Start date:      Quit date: 1964     Years since quittin.4    Smokeless tobacco: Never   Substance Use  Topics    Alcohol use: Not Currently     Comment: couple drinks a year     Allergies   Allergen Reactions    Omeprazole      diarrhea    Pantoprazole      diarrhea       Review of Systems  All systems negative except for those listed above in HPI.    EXAM:   /80 (BP Location: Right arm, Patient Position: Sitting, Cuff Size: Adult Regular)   Pulse 69   Temp 97.6  F (36.4  C) (Tympanic)   Resp 18   SpO2 96%     Physical Exam  Vitals reviewed.   Constitutional:       Appearance: Normal appearance.   HENT:      Head: Normocephalic and atraumatic.      Nose: Nose normal.      Mouth/Throat:      Mouth: Mucous membranes are moist.      Pharynx: Oropharynx is clear.   Eyes:      Extraocular Movements: Extraocular movements intact.      Conjunctiva/sclera: Conjunctivae normal.      Pupils: Pupils are equal, round, and reactive to light.   Cardiovascular:      Rate and Rhythm: Normal rate.   Pulmonary:      Effort: Pulmonary effort is normal.   Abdominal:      General: Abdomen is flat.   Musculoskeletal:         General: Tenderness present. Normal range of motion.      Cervical back: Normal range of motion and neck supple.      Comments: Mild lumbosacral back pain across midline paraspinal regions   Skin:     General: Skin is warm and dry.      Coloration: Skin is not jaundiced or pale.      Findings: No rash.   Neurological:      General: No focal deficit present.      Mental Status: He is alert and oriented to person, place, and time.      Sensory: No sensory deficit.      Motor: No weakness.      Gait: Gait normal.   Psychiatric:         Mood and Affect: Mood normal.         Behavior: Behavior normal.       Results for orders placed or performed in visit on 11/27/23   UA Macroscopic with reflex to Microscopic and Culture - Lab Collect     Status: Normal    Specimen: Urine, Midstream   Result Value Ref Range    Color Urine Yellow Colorless, Straw, Light Yellow, Yellow    Appearance Urine Clear Clear    Glucose  Urine Negative Negative mg/dL    Bilirubin Urine Negative Negative    Ketones Urine Negative Negative mg/dL    Specific Gravity Urine <=1.005 1.003 - 1.035    Blood Urine Negative Negative    pH Urine 5.0 5.0 - 7.0    Protein Albumin Urine Negative Negative mg/dL    Urobilinogen Urine 0.2 0.2, 1.0 E.U./dL    Nitrite Urine Negative Negative    Leukocyte Esterase Urine Negative Negative    Narrative    Microscopic not indicated     ASSESSMENT:    ICD-10-CM    1. Urinary frequency  R35.0 UA Macroscopic with reflex to Microscopic and Culture - Lab Collect     UA Macroscopic with reflex to Microscopic and Culture - Lab Collect      2. Chronic bilateral low back pain without sciatica  M54.50     G89.29       3. Congestive heart failure, unspecified HF chronicity, unspecified heart failure type (H)  I50.9       4. Stage 3b chronic kidney disease (H)  N18.32         PLAN:    Musculoskeletal lumbosacral back strain with urinary frequency  Recommend Tylenol ice heat massage topicals stretching PT  Offered lidoderm and pt will hold  Discussed urinary frequency could be from torsemide water pill as well as dehydration excess coffee use  No signs of infection stone JEY on urinalysis here today    Follow up with primary care provider with any problems, questions or concerns or if symptoms worsen or fail to improve. Patient agreed to plan and verbalized understanding.    Rianna Velázquez, [facility]

## 2023-12-10 ASSESSMENT — SLEEP AND FATIGUE QUESTIONNAIRES
HOW LIKELY ARE YOU TO NOD OFF OR FALL ASLEEP WHILE SITTING AND READING: HIGH CHANCE OF DOZING
HOW LIKELY ARE YOU TO NOD OFF OR FALL ASLEEP WHEN YOU ARE A PASSENGER IN A CAR FOR AN HOUR WITHOUT A BREAK: SLIGHT CHANCE OF DOZING
HOW LIKELY ARE YOU TO NOD OFF OR FALL ASLEEP WHILE WATCHING TV: MODERATE CHANCE OF DOZING
HOW LIKELY ARE YOU TO NOD OFF OR FALL ASLEEP WHILE LYING DOWN TO REST IN THE AFTERNOON WHEN CIRCUMSTANCES PERMIT: HIGH CHANCE OF DOZING
HOW LIKELY ARE YOU TO NOD OFF OR FALL ASLEEP WHILE SITTING QUIETLY AFTER LUNCH WITHOUT ALCOHOL: HIGH CHANCE OF DOZING
HOW LIKELY ARE YOU TO NOD OFF OR FALL ASLEEP WHILE SITTING INACTIVE IN A PUBLIC PLACE: WOULD NEVER DOZE
HOW LIKELY ARE YOU TO NOD OFF OR FALL ASLEEP IN A CAR, WHILE STOPPED FOR A FEW MINUTES IN TRAFFIC: WOULD NEVER DOZE
HOW LIKELY ARE YOU TO NOD OFF OR FALL ASLEEP WHILE SITTING AND TALKING TO SOMEONE: WOULD NEVER DOZE

## 2023-12-14 ENCOUNTER — VIRTUAL VISIT (OUTPATIENT)
Dept: SLEEP MEDICINE | Facility: CLINIC | Age: 83
End: 2023-12-14
Payer: COMMERCIAL

## 2023-12-14 VITALS
DIASTOLIC BLOOD PRESSURE: 58 MMHG | WEIGHT: 202.6 LBS | SYSTOLIC BLOOD PRESSURE: 149 MMHG | BODY MASS INDEX: 28.36 KG/M2 | HEIGHT: 71 IN

## 2023-12-14 DIAGNOSIS — G47.33 OSA (OBSTRUCTIVE SLEEP APNEA): Primary | ICD-10-CM

## 2023-12-14 PROCEDURE — 99213 OFFICE O/P EST LOW 20 MIN: CPT | Mod: VID | Performed by: PHYSICIAN ASSISTANT

## 2023-12-14 ASSESSMENT — SLEEP AND FATIGUE QUESTIONNAIRES
HOW LIKELY ARE YOU TO NOD OFF OR FALL ASLEEP WHILE SITTING INACTIVE IN A PUBLIC PLACE: WOULD NEVER DOZE
HOW LIKELY ARE YOU TO NOD OFF OR FALL ASLEEP WHILE SITTING QUIETLY AFTER LUNCH WITHOUT ALCOHOL: HIGH CHANCE OF DOZING
HOW LIKELY ARE YOU TO NOD OFF OR FALL ASLEEP IN A CAR, WHILE STOPPED FOR A FEW MINUTES IN TRAFFIC: WOULD NEVER DOZE
HOW LIKELY ARE YOU TO NOD OFF OR FALL ASLEEP WHEN YOU ARE A PASSENGER IN A CAR FOR AN HOUR WITHOUT A BREAK: SLIGHT CHANCE OF DOZING
HOW LIKELY ARE YOU TO NOD OFF OR FALL ASLEEP WHILE SITTING AND TALKING TO SOMEONE: WOULD NEVER DOZE
HOW LIKELY ARE YOU TO NOD OFF OR FALL ASLEEP WHILE SITTING AND READING: HIGH CHANCE OF DOZING
HOW LIKELY ARE YOU TO NOD OFF OR FALL ASLEEP WHILE LYING DOWN TO REST IN THE AFTERNOON WHEN CIRCUMSTANCES PERMIT: HIGH CHANCE OF DOZING
HOW LIKELY ARE YOU TO NOD OFF OR FALL ASLEEP WHILE WATCHING TV: MODERATE CHANCE OF DOZING

## 2023-12-14 ASSESSMENT — PAIN SCALES - GENERAL: PAINLEVEL: NO PAIN (0)

## 2023-12-14 NOTE — NURSING NOTE
Has patient had flu shot for current/most recent flu season? If so, when? Yes: 9/22/2023      Is the patient currently in the state of [place]? YES    Visit mode:VIDEO    If the visit is dropped, the patient can be reconnected by: VIDEO VISIT: Text to cell phone:   Telephone Information:   Mobile [phone]       Will anyone else be joining the visit? NO  (If patient encounters technical issues they should call [phone]    How would you like to obtain your AVS? MyChart    Are changes needed to the allergy or medication list? No    Reason for visit: RECHECK    Valeria SONG

## 2023-12-14 NOTE — PROGRESS NOTES
Virtual Visit Details    Type of service:  Video Visit   Video Start Time: 12:06PM  Video End Time:12:16 PM  Originating Location (pt. Location): Home    Distant Location (provider location):  Off-site  Platform used for Video Visit: [facility]   Outpatient Sleep Medicine  Dec 14, 2023       Name: Pascual Perea MRN# [id number]   Age: 83 year old YOB: 1940            Assessment and Plan:   1. BRANDON (obstructive sleep apnea)  Patient's sleep apnea is adequately managed and well treated with current PAP setting [id number] with low residual AHI of 3.3 events per hour. Compliance is great.  Tolerating PAP well overall but struggling with dry mouth.  Discussed addition of chinstrap but he was not interested, tried this in the past and did not tolerate.  Discussed mouth tape and or Biotene products could be helpful and he will look into this.  Once he finds effective treatment for dry mouth suspect usage of CPAP to increase since he is currently taking it off once he gets to dry in the middle of the night.  Prescription updated for new mask and supplies today.  He will follow-up in 1 year for routine CPAP follow-up or of course sooner as needed.  - Comprehensive DME       Chief Complaint      Chief Complaint   Patient presents with    RECHECK          History of Present Illness:     Pascual Perea is a 83 year old male who presents to the clinic for follow-up of their moderate obstructive sleep apnea treated with CPAP.  Other past medical history includes allergic rhinitis, GERD, cirrhosis, hypothyroidism, type 2 diabetes mellitus, hyperparathyroidism, hypertension, A. fib/a flutter, CKD, pulmonary hypertension, heart failure.     Originally diagnosed with moderate BRANDON via PSG on 8/30/2010 with an AHI 19, desaturation roscoe 84%.  CPAP and MAD used following this study but did not use very long because did not tolerate either.     Presented to my clinic 5/5/2022 to  "re-establish care and patient was interested in restarting CPAP given concern it could be contributing to pulmonary HTN, heart failure, HTN.     Repeat sleep study was ordered to determine current sleep apnea severity and PSG completed 7/7/2022 (202#) showing moderate obstructive sleep apnea, exacerbated in supine sleep, with associated oxygen desaturations and mild hypoxemia-AHI 24.5, RDI 25.4, Supine AHI 57.0, REM AHI 15.5, Low O2 84.0%, Time Spent ?88% 7.0 minutes / Time Spent ?89% 18.7 minutes.  There was also an increased frequency of periodic limb movements of sleep with associated arousals-PLM index 68.2, arousal index 28.8.  One-lead EKG showed atrial fibrillation and T wave inversion (pre-existing). Sleep architecture showed all sleep stages present with normal sleep efficiency 96.1%, but fragmentation due to respiratory events and leg movements.       At follow-up visit 7/21/2022 orders placed to restart CPAP therapy at auto 5-15 cm H2O.  No treatment initiated for PLM's given denies restless legs or any leg discomfort at night. Patient was set up with CPAP on 8/9/2022.      Last seen 9/21/22, was doing well on CPAP at that time. Given cardiac history switched him to set [id number] based on 95th% pressure 12.7cmH2O.     Returns today for routine follow-up. Overall, the patient rates their experience with PAP as 9 (0 poor, 10 great). States \"I really feel a difference when I use it I get a lot more rested\". Patient is using a nasal pillow mask. The mask is comfortable. The mask is not leaking. They are not snoring with the mask on. They are not having gasp arousals.  They are having significant oral dryness however, will wake in middle of night very dry and takes mask off. The pressure settings are comfortable.     Bedtime is typically 11:00PM. Usually it takes about 5 minutes to fall asleep with the mask on. Wake time is typically 8:00AM.     ResMed CPAP 12 cmH2O download (11/8/23-12/7/23):  30 total days of " "use. 0 nonuse days. 27 days with >4 hours use.  Average use 5 hours 49 minutes per day. Median Leak 4.9 L/min. 95%ile Leak 37.3 L/min. AHI 3.3.     SCALES:   INSOMNIA: Insomnia Severity Score: 2   SLEEPINESS: Phelps Sleepiness Score: 12    Past medical/surgical history, family history, social history, medications and allergies were reviewed.           Physical Examination:   BP (!) 149/58   Ht 1.803 m (5' 11\")   Wt 91.9 kg (202 lb 9.6 oz)   BMI 28.26 kg/m    General appearance: Awake, alert, cooperative. Well groomed. Sitting comfortably in chair. In no apparent distress.  HEENT: Head: Normocephalic, atraumatic. Eyes:Conjunctiva clear. Sclera normal. Nose: External appearance without deformity.   Pulmonary:  Able to speak easily in full sentences. No cough or wheeze.   Skin:  No rashes or significant lesions on visible skin.   Neurologic: Alert, oriented x3.   Psychiatric: Mood euthymic. Affect congruent with full range and intensity.      CC:  Andrew Elizalde PA-C  Dec 14, 2023     [facility]  [address] [place], [place] [id number] [place] [address] [facility] Center  [address] [address]    Chart documentation was completed, in part, with [employer] voice-recognition software. Even though reviewed, some grammatical, spelling, and word errors may remain.    20 minutes spent on day of encounter doing chart review, history and exam, documentation, and further activities as noted above    "

## 2023-12-26 DIAGNOSIS — E03.9 HYPOTHYROIDISM, UNSPECIFIED TYPE: ICD-10-CM

## 2023-12-28 RX ORDER — LEVOTHYROXINE SODIUM 112 UG/1
112 TABLET ORAL DAILY
Qty: 90 TABLET | Refills: 0 | Status: SHIPPED | OUTPATIENT
Start: 2023-12-28 | End: 2024-03-26

## 2024-01-17 NOTE — ED NOTES
"[facility]  ED Nurse Handoff Report    ED Chief complaint: Shortness of Breath      ED Diagnosis:   Final diagnoses:   Chest pain, unspecified type   Hx of CABG       Code Status: Full code last noted in system, needs to be confirmed by hospitalist.     Allergies:   Allergies   Allergen Reactions     Omeprazole      diarrhea       Patient Story: pt reports to ED for evaluation of shortness of breath and increased fatigue.   Focused Assessment:  Pt has history of atrial fibrillation, pt reports a bypass surgery, and pt is currently reporting an \"ache in his chest\" over the past couple weeks as long as increased amount of generalized fatigue with no amount of energy to complete things at home. Pt is satting 97% on RA, respirations are equal and non- labored. Pt is resting supine in bed with family watching TV.     Treatments and/or interventions provided: Aspirin PO, SL nitroglycerin not given as pt intially had multiple SYS BP <100 so MD can verbal order to without from nitroglycerin administration.   Patient's response to treatments and/or interventions: NA    To be done/followed up on inpatient unit:  Monitoring    Does this patient have any cognitive concerns?: NA    Activity level - Baseline/Home:  Independent  Activity Level - Current:   Stand with Assist    Patient's Preferred language: English   Needed?: No    Isolation: None  Infection: Not Applicable  Bariatric?: No    Vital Signs:   Vitals:    01/08/20 1540 01/08/20 1545 01/08/20 1600 01/08/20 1605   BP: 123/64 103/55 105/64 110/58   Pulse: 89 82  80   Resp: 15 22  15   Temp:       SpO2: 97% 99%  97%   Weight:       Height:           Cardiac Rhythm: Sinus Ryhthm    Was the PSS-3 completed:   Yes  What interventions are required if any?  NA             Family Comments: Family at bedside  OBS brochure/video discussed/provided to patient/family: Yes              Name of person given brochure if not patient: NA              " Relationship to patient: NA    For the majority of the shift this patient's behavior was Green.   Behavioral interventions performed were NA.    ED NURSE PHONE NUMBER: *[phone]          Take over the counter pain medication

## 2024-01-22 DIAGNOSIS — Z79.4 TYPE 2 DIABETES MELLITUS WITHOUT COMPLICATION, WITH LONG-TERM CURRENT USE OF INSULIN (H): ICD-10-CM

## 2024-01-22 DIAGNOSIS — E11.9 TYPE 2 DIABETES MELLITUS WITHOUT COMPLICATION, WITH LONG-TERM CURRENT USE OF INSULIN (H): ICD-10-CM

## 2024-01-22 RX ORDER — PEN NEEDLE, DIABETIC 31 GX3/16"
NEEDLE, DISPOSABLE MISCELLANEOUS
Qty: 100 EACH | Refills: 0 | Status: SHIPPED | OUTPATIENT
Start: 2024-01-22 | End: 2024-03-05

## 2024-01-28 ENCOUNTER — HEALTH MAINTENANCE LETTER (OUTPATIENT)
Age: 84
End: 2024-01-28

## 2024-01-29 DIAGNOSIS — Z79.4 TYPE 2 DIABETES MELLITUS WITHOUT COMPLICATION, WITH LONG-TERM CURRENT USE OF INSULIN (H): ICD-10-CM

## 2024-01-29 DIAGNOSIS — E11.9 TYPE 2 DIABETES MELLITUS WITHOUT COMPLICATION, WITH LONG-TERM CURRENT USE OF INSULIN (H): ICD-10-CM

## 2024-02-13 ENCOUNTER — TRANSFERRED RECORDS (OUTPATIENT)
Dept: HEALTH INFORMATION MANAGEMENT | Facility: CLINIC | Age: 84
End: 2024-02-13
Payer: COMMERCIAL

## 2024-02-13 LAB — RETINOPATHY: NEGATIVE

## 2024-03-04 DIAGNOSIS — Z79.4 TYPE 2 DIABETES MELLITUS WITHOUT COMPLICATION, WITH LONG-TERM CURRENT USE OF INSULIN (H): ICD-10-CM

## 2024-03-04 DIAGNOSIS — E11.9 TYPE 2 DIABETES MELLITUS WITHOUT COMPLICATION, WITH LONG-TERM CURRENT USE OF INSULIN (H): ICD-10-CM

## 2024-03-05 RX ORDER — PEN NEEDLE, DIABETIC 31 GX3/16"
NEEDLE, DISPOSABLE MISCELLANEOUS
Qty: 400 EACH | Refills: 3 | Status: SHIPPED | OUTPATIENT
Start: 2024-03-05

## 2024-03-05 RX ORDER — INSULIN ASPART 100 [IU]/ML
INJECTION, SOLUTION INTRAVENOUS; SUBCUTANEOUS
Qty: 45 ML | Refills: 3 | Status: SHIPPED | OUTPATIENT
Start: 2024-03-05 | End: 2024-04-24

## 2024-04-07 ENCOUNTER — HEALTH MAINTENANCE LETTER (OUTPATIENT)
Age: 84
End: 2024-04-07

## 2024-04-17 ENCOUNTER — LAB (OUTPATIENT)
Dept: LAB | Facility: CLINIC | Age: 84
End: 2024-04-17
Payer: COMMERCIAL

## 2024-04-17 DIAGNOSIS — N18.32 STAGE 3B CHRONIC KIDNEY DISEASE (H): Primary | ICD-10-CM

## 2024-04-17 DIAGNOSIS — E11.9 TYPE 2 DIABETES MELLITUS WITHOUT COMPLICATION, WITH LONG-TERM CURRENT USE OF INSULIN (H): ICD-10-CM

## 2024-04-17 DIAGNOSIS — Z79.4 TYPE 2 DIABETES MELLITUS WITHOUT COMPLICATION, WITH LONG-TERM CURRENT USE OF INSULIN (H): ICD-10-CM

## 2024-04-17 DIAGNOSIS — E78.5 HYPERLIPIDEMIA LDL GOAL <70: ICD-10-CM

## 2024-04-17 DIAGNOSIS — E03.9 HYPOTHYROIDISM, UNSPECIFIED TYPE: ICD-10-CM

## 2024-04-17 LAB
ERYTHROCYTE [DISTWIDTH] IN BLOOD BY AUTOMATED COUNT: 13.3 % (ref 10–15)
HBA1C MFR BLD: 8 % (ref 0–5.6)
HCT VFR BLD AUTO: 36.8 % (ref 40–53)
HGB BLD-MCNC: 12.1 G/DL (ref 13.3–17.7)
HOLD SPECIMEN: NORMAL
MCH RBC QN AUTO: 28.9 PG (ref 26.5–33)
MCHC RBC AUTO-ENTMCNC: 32.9 G/DL (ref 31.5–36.5)
MCV RBC AUTO: 88 FL (ref 78–100)
PLATELET # BLD AUTO: 183 10E3/UL (ref 150–450)
RBC # BLD AUTO: 4.19 10E6/UL (ref 4.4–5.9)
WBC # BLD AUTO: 4.6 10E3/UL (ref 4–11)

## 2024-04-17 PROCEDURE — 36415 COLL VENOUS BLD VENIPUNCTURE: CPT

## 2024-04-17 PROCEDURE — 82570 ASSAY OF URINE CREATININE: CPT

## 2024-04-17 PROCEDURE — 80061 LIPID PANEL: CPT

## 2024-04-17 PROCEDURE — 83970 ASSAY OF PARATHORMONE: CPT

## 2024-04-17 PROCEDURE — 82306 VITAMIN D 25 HYDROXY: CPT

## 2024-04-17 PROCEDURE — 83036 HEMOGLOBIN GLYCOSYLATED A1C: CPT

## 2024-04-17 PROCEDURE — 82043 UR ALBUMIN QUANTITATIVE: CPT

## 2024-04-17 PROCEDURE — 84439 ASSAY OF FREE THYROXINE: CPT

## 2024-04-17 PROCEDURE — 85027 COMPLETE CBC AUTOMATED: CPT

## 2024-04-17 PROCEDURE — 80053 COMPREHEN METABOLIC PANEL: CPT

## 2024-04-17 PROCEDURE — 84443 ASSAY THYROID STIM HORMONE: CPT

## 2024-04-18 LAB
ALBUMIN SERPL BCG-MCNC: 4.9 G/DL (ref 3.5–5.2)
ALP SERPL-CCNC: 81 U/L (ref 40–150)
ALT SERPL W P-5'-P-CCNC: 30 U/L (ref 0–70)
ANION GAP SERPL CALCULATED.3IONS-SCNC: 10 MMOL/L (ref 7–15)
AST SERPL W P-5'-P-CCNC: 30 U/L (ref 0–45)
BILIRUB SERPL-MCNC: 0.5 MG/DL
BUN SERPL-MCNC: 23.7 MG/DL (ref 8–23)
CALCIUM SERPL-MCNC: 11.2 MG/DL (ref 8.8–10.2)
CHLORIDE SERPL-SCNC: 99 MMOL/L (ref 98–107)
CHOLEST SERPL-MCNC: 144 MG/DL
CREAT SERPL-MCNC: 1.52 MG/DL (ref 0.67–1.17)
CREAT UR-MCNC: 28.3 MG/DL
DEPRECATED HCO3 PLAS-SCNC: 28 MMOL/L (ref 22–29)
EGFRCR SERPLBLD CKD-EPI 2021: 45 ML/MIN/1.73M2
FASTING STATUS PATIENT QL REPORTED: YES
GLUCOSE SERPL-MCNC: 185 MG/DL (ref 70–99)
HDLC SERPL-MCNC: 36 MG/DL
LDLC SERPL CALC-MCNC: 43 MG/DL
MICROALBUMIN UR-MCNC: <12 MG/L
MICROALBUMIN/CREAT UR: NORMAL MG/G{CREAT}
NONHDLC SERPL-MCNC: 108 MG/DL
POTASSIUM SERPL-SCNC: 4.7 MMOL/L (ref 3.4–5.3)
PROT SERPL-MCNC: 7.3 G/DL (ref 6.4–8.3)
PTH-INTACT SERPL-MCNC: 62 PG/ML (ref 15–65)
SODIUM SERPL-SCNC: 137 MMOL/L (ref 135–145)
T4 FREE SERPL-MCNC: 1.3 NG/DL (ref 0.9–1.7)
TRIGL SERPL-MCNC: 324 MG/DL
TSH SERPL DL<=0.005 MIU/L-ACNC: 4.36 UIU/ML (ref 0.3–4.2)
VIT D+METAB SERPL-MCNC: 24 NG/ML (ref 20–50)

## 2024-04-19 SDOH — HEALTH STABILITY: PHYSICAL HEALTH: ON AVERAGE, HOW MANY DAYS PER WEEK DO YOU ENGAGE IN MODERATE TO STRENUOUS EXERCISE (LIKE A BRISK WALK)?: 3 DAYS

## 2024-04-19 SDOH — HEALTH STABILITY: PHYSICAL HEALTH: ON AVERAGE, HOW MANY MINUTES DO YOU ENGAGE IN EXERCISE AT THIS LEVEL?: 30 MIN

## 2024-04-19 ASSESSMENT — SOCIAL DETERMINANTS OF HEALTH (SDOH): HOW OFTEN DO YOU GET TOGETHER WITH FRIENDS OR RELATIVES?: ONCE A WEEK

## 2024-04-24 ENCOUNTER — OFFICE VISIT (OUTPATIENT)
Dept: INTERNAL MEDICINE | Facility: CLINIC | Age: 84
End: 2024-04-24
Payer: COMMERCIAL

## 2024-04-24 DIAGNOSIS — E11.22 TYPE 2 DIABETES MELLITUS WITH STAGE 3B CHRONIC KIDNEY DISEASE, WITH LONG-TERM CURRENT USE OF INSULIN (H): ICD-10-CM

## 2024-04-24 DIAGNOSIS — E83.52 HYPERCALCEMIA: ICD-10-CM

## 2024-04-24 DIAGNOSIS — N18.32 STAGE 3B CHRONIC KIDNEY DISEASE (H): ICD-10-CM

## 2024-04-24 DIAGNOSIS — Z00.00 ENCOUNTER FOR MEDICARE ANNUAL WELLNESS EXAM: Primary | ICD-10-CM

## 2024-04-24 DIAGNOSIS — I48.91 ATRIAL FIBRILLATION, UNSPECIFIED TYPE (H): ICD-10-CM

## 2024-04-24 DIAGNOSIS — E03.9 HYPOTHYROIDISM, UNSPECIFIED TYPE: ICD-10-CM

## 2024-04-24 DIAGNOSIS — Z79.4 TYPE 2 DIABETES MELLITUS WITH STAGE 3B CHRONIC KIDNEY DISEASE, WITH LONG-TERM CURRENT USE OF INSULIN (H): ICD-10-CM

## 2024-04-24 DIAGNOSIS — I25.119 ATHEROSCLEROSIS OF NATIVE CORONARY ARTERY OF NATIVE HEART WITH ANGINA PECTORIS (H): ICD-10-CM

## 2024-04-24 DIAGNOSIS — N18.32 TYPE 2 DIABETES MELLITUS WITH STAGE 3B CHRONIC KIDNEY DISEASE, WITH LONG-TERM CURRENT USE OF INSULIN (H): ICD-10-CM

## 2024-04-24 PROCEDURE — 99207 PR FOOT EXAM NO CHARGE: CPT | Performed by: INTERNAL MEDICINE

## 2024-04-24 PROCEDURE — G0439 PPPS, SUBSEQ VISIT: HCPCS | Performed by: INTERNAL MEDICINE

## 2024-04-24 PROCEDURE — 99214 OFFICE O/P EST MOD 30 MIN: CPT | Mod: 25 | Performed by: INTERNAL MEDICINE

## 2024-04-24 RX ORDER — LEVOTHYROXINE SODIUM 125 UG/1
125 TABLET ORAL DAILY
Qty: 90 TABLET | Refills: 3 | Status: SHIPPED | OUTPATIENT
Start: 2024-04-24

## 2024-04-24 RX ORDER — ISOSORBIDE MONONITRATE 30 MG/1
30 TABLET, EXTENDED RELEASE ORAL DAILY
Qty: 30 TABLET | Refills: 11 | Status: SHIPPED | OUTPATIENT
Start: 2024-04-24 | End: 2024-05-17

## 2024-04-24 RX ORDER — INSULIN ASPART 100 [IU]/ML
6-10 INJECTION, SOLUTION INTRAVENOUS; SUBCUTANEOUS
Status: SHIPPED
Start: 2024-04-24

## 2024-04-24 RX ORDER — RESPIRATORY SYNCYTIAL VIRUS VACCINE 120MCG/0.5
0.5 KIT INTRAMUSCULAR ONCE
Qty: 1 EACH | Refills: 0 | Status: CANCELLED | OUTPATIENT
Start: 2024-04-24 | End: 2024-04-24

## 2024-04-24 NOTE — PROGRESS NOTES
Preventive Care Visit  [facility]  Andrew Elizalde MD, Internal Medicine  Apr 24, 2024      ASSESSMENT:    1. Encounter for Medicare annual wellness exam   See plan discussion below for HCM recs. Independent in ADLs. Slower gait but stable with cane. No recent falls    2. Hypothyroidism, unspecified type   Thyroid fxn too low. Increase Levothyroxine and repeat labs 6 weeks  - levothyroxine (SYNTHROID/LEVOTHROID) 125 MCG tablet; Take 1 tablet (125 mcg) by mouth daily  Dispense: 90 tablet; Refill: 3  - TSH with free T4 reflex; Future    3. Hypercalcemia   Last 2 PTH levels normal. Denies calcium supplement and VitD OK. ? Familial hypercalcemia. Not on meds to cause. Repeat  lab 6 weeks. If worsened, will get 24hr urine  - Basic metabolic panel; Future    4. Atherosclerosis of native coronary artery of native heart with angina pectoris (H24)  Chronic mild CP with exertion that pt states is unchanged from past 1 year. Known CAD. Prior heart imaging noted. WIll therefor treat medically with Imdur trial  isosorbide mononitrate (IMDUR) 30 MG 24 hr tablet, Take 1 tablet (30 mg) by mouth daily, Disp-30 tablet, R-11     5. Type 2 diabetes mellitus with stage 3b chronic kidney disease, with long-term current use of insulin (H)   Needs improved control;. Increase lantus. Repeat lab 3 mos. Improve DM diet  - Basic metabolic panel; Future  - FOOT EXAM  - Insulin Aspart FlexPen 100 UNIT/ML SOPN; Inject 6-10 Units Subcutaneous 3 times daily (with meals) Plus priming    6. Stage 3b chronic kidney disease (H)   Previously stable. Repeat lab 6 weeks with calcium recheck  - Basic metabolic panel; Future    7. Atrial fibrillation, unspecified type (H)  Rate controlled and on AC        PLAN:  Stop Levothyroxine 112mcg tab for now   Increase Levothyroxine to 125mcg tab, 1 tab daily in AM for thyroid  Isosorbide 30mg tab,1 tab daily in AM for chest pressure and shortness of breath   Increase Lantus insulin to 66 units  daily at bedtime for diabetes   Increase Novolog by 1 unit with lunch and reduce Novolog by 1 unit with supper  Call  [phone] or use [facility] to schedule a future lab appointment  non-fasting in 6 weeks for thyroid and calcium and repeat nonfasting A1C lab in 3 months for diabetes  Would recommend flu and  covid vaccine (if willing) this Fall  at pharmacy. Pt declines covid vaccine today  Continue other medications.   Send update in [facility] in 3 weeks re: pressure in chest after starting Isosorbide/Imdur  Pt was informed regarding extra E&M billing for management of new or established medical issues not related to today's wellness/screening visit           Max Naylor is a 83 year old, presenting for the following:  Wellness Visit  And follow-up medical issues as above      4/24/2024     1:07 PM   Additional Questions   Accompanied by Self       Health Care Directive  Patient does not have a Health Care Directive or Living Will: Advance Directive received and scanned. Click on Code in the patient header to view.    HPI         4/19/2024   General Health   How would you rate your overall physical health? Good   Feel stress (tense, anxious, or unable to sleep) Not at all         4/19/2024   Nutrition   Diet: Diabetic         4/19/2024   Exercise   Days per week of moderate/strenous exercise 3 days   Average minutes spent exercising at this level 30 min         4/19/2024   Social Factors   Frequency of gathering with friends or relatives Once a week   Worry food won't last until get money to buy more No   Food not last or not have enough money for food? No   Do you have housing?  Yes   Are you worried about losing your housing? No   Lack of transportation? No   Unable to get utilities (heat,electricity)? No         4/24/2024   Fall Risk   Gait Speed Test (Document in seconds) 5.97   Gait Speed Test Interpretation Greater than 5.01 seconds - ABNORMAL          4/19/2024   Activities of Daily Living- Home Safety    Needs help with the following daily activites None of the above   Safety concerns in the home None of the above         2024   Dental   Dentist two times every year? Yes         2024   Hearing Screening   Hearing concerns? (!) I NEED TO ASK PEOPLE TO SPEAK UP OR REPEAT THEMSELVES.    (!) TROUBLE UNDERSTANDING SOFT OR WHISPERED SPEECH.         2024   Driving Risk Screening   Patient/family members have concerns about driving No         2024   General Alertness/Fatigue Screening   Have you been more tired than usual lately? (!) YES         2024   Urinary Incontinence Screening   Bothered by leaking urine in past 6 months No         2024   TB Screening   Were you born outside of the [place]? No         Today's PHQ-2 Score:       2024     2:36 PM   PHQ-2 (  [employer])   Q1: Little interest or pleasure in doing things 1   Q2: Feeling down, depressed or hopeless 1   PHQ-2 Score 2   Q1: Little interest or pleasure in doing things Several days   Q2: Feeling down, depressed or hopeless Several days   PHQ-2 Score 2           2024   Substance Use   Alcohol more than 3/day or more than 7/wk No   Do you have a current opioid prescription? No   How severe/bad is pain from 1 to 10? 1/10   Do you use any other substances recreationally? No     Social History     Tobacco Use    Smoking status: Former     Current packs/day: 0.00     Average packs/day: 1 pack/day for 6.5 years (6.5 ttl pk-yrs)     Types: Cigarettes, Cigars, Pipe     Start date:      Quit date: 1964     Years since quittin.8    Smokeless tobacco: Never   Vaping Use    Vaping status: Never Used   Substance Use Topics    Alcohol use: Not Currently     Comment: couple drinks a year    Drug use: No     Sexual health reviewed and updated as needed this visit by Provider    Current providers sharing in care for this patient include:  Patient Care Team:  Andrew Elizalde MD as PCP - General  Andrew Elizalde MD as Assigned  PCP  Krystal Jones RD as Diabetes Educator (Dietitian, Registered)  Phoenix Adams MD as Assigned Heart and Vascular Provider  Chrissy Goddard PA-C as Assigned Sleep Provider  Abiodun Nash MD as Assigned Neuroscience Provider  Mirta Desai, PharmD as Pharmacist (Pharmacist)    The following health maintenance items are reviewed in Epic and correct as of today:  Health Maintenance   Topic Date Due    HF ACTION PLAN  Never done    ANNUAL REVIEW OF HM ORDERS  Never done    HEPATITIS A IMMUNIZATION (1 of 2 - Risk 2-dose series) Never done    HEPATITIS B IMMUNIZATION (1 of 3 - Risk 3-dose series) Never done    RSV VACCINE (Pregnancy & 60+) (1 - 1-dose 60+ series) Never done    COLORECTAL CANCER SCREENING  03/22/2022    COVID-19 Vaccine (4 - 2023-24 season) 09/01/2023    DIABETIC FOOT EXAM  06/29/2024    A1C  10/17/2024    BMP  10/17/2024    EYE EXAM  02/13/2025    ALT  04/17/2025    LIPID  04/17/2025    MICROALBUMIN  04/17/2025    CREATININE  04/17/2025    CBC  04/17/2025    HEMOGLOBIN  04/17/2025    MEDICARE ANNUAL WELLNESS VISIT  04/24/2025    FALL RISK ASSESSMENT  04/24/2025    ADVANCE CARE PLANNING  09/15/2026    DTAP/TDAP/TD IMMUNIZATION (3 - Td or Tdap) 01/12/2031    TSH W/FREE T4 REFLEX  Completed    PHQ-2 (once per calendar year)  Completed    INFLUENZA VACCINE  Completed    Pneumococcal Vaccine: 65+ Years  Completed    URINALYSIS  Completed    ZOSTER IMMUNIZATION  Completed    IPV IMMUNIZATION  Aged Out    HPV IMMUNIZATION  Aged Out    MENINGITIS IMMUNIZATION  Aged Out    RSV MONOCLONAL ANTIBODY  Aged Out            Sugars 4x/day:  221,189,269,189  204,218,263,2154  189,249,245,220  108,181,242,154  112,166,215,169  118    BPs at home often 140-150/ 60-70s    Component      Latest Ref Rng 8/3/2022  7:54 AM 6/29/2023  3:35 PM 10/4/2023  10:34 AM 4/17/2024  8:32 AM   Sodium      135 - 145 mmol/L 140  141   137    Potassium      3.4 - 5.3 mmol/L 4.3       Chloride      94 - 109 mmol/L 111 (H)        Carbon Dioxide      20 - 32 mmol/L 28       Anion Gap      7 - 15 mmol/L 1 (L)  11   10    Urea Nitrogen      7 - 30 mg/dL 23       Creatinine      0.67 - 1.17 mg/dL 1.53 (H)  1.59 (H)   1.52 (H)    Calcium      8.8 - 10.2 mg/dL 10.3 (H)  11.1 (H)   11.2 (H)    Glucose      70 - 99 mg/dL 138 (H)       Alkaline Phosphatase      40 - 150 U/L 74    81    AST      0 - 45 U/L 23    30    ALT      0 - 70 U/L 34    30    Protein Total      6.4 - 8.3 g/dL 7.1    7.3    Albumin      3.4 - 5.0 g/dL 4.2       Bilirubin Total      <=1.2 mg/dL 0.8    0.5    GFR Estimate      >60 mL/min/1.73m2 45 (L)  43 (L)   45 (L)    Potassium      3.4 - 5.3 mmol/L  4.0   4.7    Carbon Dioxide (CO2)      22 - 29 mmol/L  28   28    Urea Nitrogen      8.0 - 23.0 mg/dL  19.0   23.7 (H)    Chloride      98 - 107 mmol/L  102   99    Glucose      70 - 99 mg/dL  99   185 (H)    Albumin      3.5 - 5.2 g/dL    4.9    WBC      4.0 - 11.0 10e3/uL 4.4    4.6    RBC Count      4.40 - 5.90 10e6/uL 4.25 (L)    4.19 (L)    Hemoglobin      13.3 - 17.7 g/dL 11.9 (L)    12.1 (L)    Hematocrit      40.0 - 53.0 % 36.5 (L)    36.8 (L)    MCV      78 - 100 fL 86    88    MCH      26.5 - 33.0 pg 28.0    28.9    MCHC      31.5 - 36.5 g/dL 32.6    32.9    RDW      10.0 - 15.0 % 13.6    13.3    Platelet Count      150 - 450 10e3/uL 172    183    Cholesterol      <200 mg/dL    144    Triglycerides      <150 mg/dL    324 (H)    HDL Cholesterol      >=40 mg/dL    36 (L)    LDL Cholesterol Calculated      <=100 mg/dL    43    Non HDL Cholesterol      <130 mg/dL    108    Patient Fasting?    Yes    Creatinine Urine      mg/dL       Albumin Urine mg/L      mg/L       Albumin Urine mg/g Cr       Hemoglobin A1C      0.0 - 5.6 % 7.0 (H)   7.7 (H)  8.0 (H)    Parathyroid Hormone Intact      15 - 65 pg/mL 57    62    Ferritin      31 - 409 ng/mL  182      TSH      0.30 - 4.20 uIU/mL    4.36 (H)    Vitamin D, Total (25-Hydroxy)      20 - 50 ng/mL    24    T4 Free      0.90 -  1.70 ng/dL    1.30      Component      Latest Ref Rng 4/17/2024  8:40 AM   Sodium      135 - 145 mmol/L    Potassium      3.4 - 5.3 mmol/L    Chloride      94 - 109 mmol/L    Carbon Dioxide      20 - 32 mmol/L    Anion Gap      7 - 15 mmol/L    Urea Nitrogen      7 - 30 mg/dL    Creatinine      0.67 - 1.17 mg/dL    Calcium      8.8 - 10.2 mg/dL    Glucose      70 - 99 mg/dL    Alkaline Phosphatase      40 - 150 U/L    AST      0 - 45 U/L    ALT      0 - 70 U/L    Protein Total      6.4 - 8.3 g/dL    Albumin      3.4 - 5.0 g/dL    Bilirubin Total      <=1.2 mg/dL    GFR Estimate      >60 mL/min/1.73m2    Potassium      3.4 - 5.3 mmol/L    Carbon Dioxide (CO2)      22 - 29 mmol/L    Urea Nitrogen      8.0 - 23.0 mg/dL    Chloride      98 - 107 mmol/L    Glucose      70 - 99 mg/dL    Albumin      3.5 - 5.2 g/dL    WBC      4.0 - 11.0 10e3/uL    RBC Count      4.40 - 5.90 10e6/uL    Hemoglobin      13.3 - 17.7 g/dL    Hematocrit      40.0 - 53.0 %    MCV      78 - 100 fL    MCH      26.5 - 33.0 pg    MCHC      31.5 - 36.5 g/dL    RDW      10.0 - 15.0 %    Platelet Count      150 - 450 10e3/uL    Cholesterol      <200 mg/dL    Triglycerides      <150 mg/dL    HDL Cholesterol      >=40 mg/dL    LDL Cholesterol Calculated      <=100 mg/dL    Non HDL Cholesterol      <130 mg/dL    Patient Fasting?    Creatinine Urine      mg/dL 28.3    Albumin Urine mg/L      mg/L <12.0    Albumin Urine mg/g Cr --    Hemoglobin A1C      0.0 - 5.6 %    Parathyroid Hormone Intact      15 - 65 pg/mL    Ferritin      31 - 409 ng/mL    TSH      0.30 - 4.20 uIU/mL    Vitamin D, Total (25-Hydroxy)      20 - 50 ng/mL    T4 Free      0.90 - 1.70 ng/dL       Legend:  (H) High  (L) Low    Review of Systems  CONSTITUTIONAL: NEGATIVE for fever, chills,. Weight up 7 pounds  INTEGUMENTARY/SKIN: NEGATIVE for worrisome rashes, moles or lesions  EYES: NEGATIVE for vision changes or irritation. Eye exam Feb 2024.  ENT/MOUTH: NEGATIVE for ear, mouth and throat  "problems.  Has hearing aids  RESP: NEGATIVE for significant cough. Minimal SOB, chronic.  Using CPAP  CV: NEGATIVE for   palpitations or peripheral edema. Mild.chest pain with exertion that is chronic and unchanged over past 1 year per pt  GI: NEGATIVE for nausea, abdominal pain, heartburn, or change in bowel habits  : NEGATIVE for frequency, dysuria, or hematuria  MUSCULOSKELETAL: NEGATIVE for significant arthralgias or myalgia that limit activity. Occ LBP  NEURO: NEGATIVE for weakness, dizziness. Mild neuropathy in feet bilaterally. Slight in hands. No function loss in hands  ENDOCRINE: NEGATIVE for temperature intolerance. Sugars as above  HEME: NEGATIVE for bleeding problems  PSYCHIATRIC: NEGATIVE for changes in mood or affect     Objective    Exam  /60   Pulse 82   Temp 98.3  F (36.8  C) (Tympanic)   Resp 18   Ht 1.803 m (5' 11\")   Wt 94.3 kg (208 lb)   SpO2 97%   BMI 29.01 kg/m     Estimated body mass index is 29.01 kg/m  as calculated from the following:    Height as of this encounter: 1.803 m (5' 11\").    Weight as of this encounter: 94.3 kg (208 lb).    Physical Exam  General appearance -  alert, no distress  Skin - No rashes or lesions.  Head - normocephalic, atraumatic  Eyes - ANGELA, EOMI, fundi exam with nondilated pupils negative.  Ears - External ears normal. Canals clear. TM's normal. Bilateral hearing aids  Nose/Sinuses - Nares normal. Septum midline. Mucosa normal. No drainage or sinus tenderness.  Oropharynx - No erythema, no adenopathy, no exudates.  Neck - Supple without adenopathy or thyromegaly. No bruits.  Lungs - Clear to auscultation without wheezes/rhonchi.  Heart - Irregular rate and rhythm without murmurs, clicks, or gallops.  Nodes - No supraclavicular, axillary, or inguinal adenopathy palpable.  Abdomen - Abdomen soft, non-tender. BS normal. No masses or hepatosplenomegaly palpable. No bruits.  Extremities -No cyanosis, clubbing or edema.    Musculoskeletal -  Muscular " strength intact.  Mild tenderness to palpation bilateral paralumbar musculature. Neg SLRT bilaterally. DIP joints hands with osteoarthritis thickening  Peripheral pulses - radial=4/4, femoral=4/4, posterior tibial=4/4, dorsalis pedis=4/4,  Neuro - Gait slower but stable with cane despite slower speed. Reflexes normal and symmetric. Sensation minimally reduced to LTS in feet bilaterally. Intact in hands  Genital - Normal-appearing male external genitalia. No scrotal masses or inguinal hernia palpable.   Rectal - deferred          4/24/2024   Mini Cog   Clock Draw Score 2 Normal   3 Item Recall 3 objects recalled   Mini Cog Total Score 5             Signed Electronically by: Andrew Elizalde MD

## 2024-04-24 NOTE — PATIENT INSTRUCTIONS
Stop Levothyroxine 112mcg tab for now   Increase Levothyroxine to 125mcg tab, 1 tab daily in AM for thyroid  Isosorbide 30mg tab,1 tab daily in AM for chest pressure and shortness of breath   Increase Lantus insulin to 66 units daily at bedtime for diabetes   Increase Novolog by 1 unit with lunch and reduce Novolog by 1 unit with supper  Call  [phone] or use [facility] to schedule a future lab appointment  non-fasting in 6 weeks for thyroid and calcium and repeat nonfasting A1C lab in 3 months for diabetes  Would recommend flu and  covid vaccine (if willing) this Fall  at pharmacy  Continue other medications.   Send update in [facility] in 3 weeks re: pressure in chest after starting Isosorbide/Imdur  Pt was informed regarding extra E&M billing for management of new or established medical issues not related to today's wellness/screening visit

## 2024-05-15 ENCOUNTER — HOSPITAL ENCOUNTER (EMERGENCY)
Facility: CLINIC | Age: 84
Discharge: HOME | End: 2024-05-15
Attending: STUDENT IN AN ORGANIZED HEALTH CARE EDUCATION/TRAINING PROGRAM | Admitting: STUDENT IN AN ORGANIZED HEALTH CARE EDUCATION/TRAINING PROGRAM
Payer: COMMERCIAL

## 2024-05-15 VITALS
HEART RATE: 75 BPM | RESPIRATION RATE: 20 BRPM | WEIGHT: 204 LBS | DIASTOLIC BLOOD PRESSURE: 73 MMHG | OXYGEN SATURATION: 96 % | SYSTOLIC BLOOD PRESSURE: 142 MMHG | TEMPERATURE: 97.1 F | BODY MASS INDEX: 28.45 KG/M2

## 2024-05-15 DIAGNOSIS — R73.9 HYPERGLYCEMIA: ICD-10-CM

## 2024-05-15 LAB
ANION GAP SERPL CALCULATED.3IONS-SCNC: 9 MMOL/L (ref 7–15)
B-OH-BUTYR SERPL-SCNC: <0.18 MMOL/L
BASOPHILS # BLD AUTO: 0.1 10E3/UL (ref 0–0.2)
BASOPHILS NFR BLD AUTO: 1 %
BUN SERPL-MCNC: 24.1 MG/DL (ref 8–23)
CALCIUM SERPL-MCNC: 10.9 MG/DL (ref 8.8–10.2)
CHLORIDE SERPL-SCNC: 101 MMOL/L (ref 98–107)
CREAT SERPL-MCNC: 1.47 MG/DL (ref 0.67–1.17)
DEPRECATED HCO3 PLAS-SCNC: 25 MMOL/L (ref 22–29)
EGFRCR SERPLBLD CKD-EPI 2021: 47 ML/MIN/1.73M2
EOSINOPHIL # BLD AUTO: 0.2 10E3/UL (ref 0–0.7)
EOSINOPHIL NFR BLD AUTO: 3 %
ERYTHROCYTE [DISTWIDTH] IN BLOOD BY AUTOMATED COUNT: 13.5 % (ref 10–15)
FASTING STATUS PATIENT QL REPORTED: YES
GLUCOSE SERPL-MCNC: 233 MG/DL (ref 70–99)
HCT VFR BLD AUTO: 33.9 % (ref 40–53)
HGB BLD-MCNC: 10.9 G/DL (ref 13.3–17.7)
HOLD SPECIMEN: NORMAL
HOLD SPECIMEN: NORMAL
IMM GRANULOCYTES # BLD: 0 10E3/UL
IMM GRANULOCYTES NFR BLD: 0 %
LYMPHOCYTES # BLD AUTO: 0.9 10E3/UL (ref 0.8–5.3)
LYMPHOCYTES NFR BLD AUTO: 19 %
MCH RBC QN AUTO: 28.4 PG (ref 26.5–33)
MCHC RBC AUTO-ENTMCNC: 32.2 G/DL (ref 31.5–36.5)
MCV RBC AUTO: 88 FL (ref 78–100)
MONOCYTES # BLD AUTO: 0.4 10E3/UL (ref 0–1.3)
MONOCYTES NFR BLD AUTO: 8 %
NEUTROPHILS # BLD AUTO: 3.4 10E3/UL (ref 1.6–8.3)
NEUTROPHILS NFR BLD AUTO: 69 %
NRBC # BLD AUTO: 0 10E3/UL
NRBC BLD AUTO-RTO: 0 /100
PLATELET # BLD AUTO: 192 10E3/UL (ref 150–450)
POTASSIUM SERPL-SCNC: 4.2 MMOL/L (ref 3.4–5.3)
RBC # BLD AUTO: 3.84 10E6/UL (ref 4.4–5.9)
SODIUM SERPL-SCNC: 135 MMOL/L (ref 135–145)
TSH SERPL DL<=0.005 MIU/L-ACNC: 2.16 UIU/ML (ref 0.3–4.2)
WBC # BLD AUTO: 4.9 10E3/UL (ref 4–11)

## 2024-05-15 PROCEDURE — 85025 COMPLETE CBC W/AUTO DIFF WBC: CPT | Performed by: STUDENT IN AN ORGANIZED HEALTH CARE EDUCATION/TRAINING PROGRAM

## 2024-05-15 PROCEDURE — 99283 EMERGENCY DEPT VISIT LOW MDM: CPT

## 2024-05-15 PROCEDURE — 36415 COLL VENOUS BLD VENIPUNCTURE: CPT | Performed by: EMERGENCY MEDICINE

## 2024-05-15 PROCEDURE — 80048 BASIC METABOLIC PNL TOTAL CA: CPT | Performed by: STUDENT IN AN ORGANIZED HEALTH CARE EDUCATION/TRAINING PROGRAM

## 2024-05-15 PROCEDURE — 84443 ASSAY THYROID STIM HORMONE: CPT | Performed by: STUDENT IN AN ORGANIZED HEALTH CARE EDUCATION/TRAINING PROGRAM

## 2024-05-15 PROCEDURE — 85025 COMPLETE CBC W/AUTO DIFF WBC: CPT | Performed by: EMERGENCY MEDICINE

## 2024-05-15 PROCEDURE — 82374 ASSAY BLOOD CARBON DIOXIDE: CPT | Performed by: EMERGENCY MEDICINE

## 2024-05-15 PROCEDURE — 82010 KETONE BODYS QUAN: CPT | Performed by: STUDENT IN AN ORGANIZED HEALTH CARE EDUCATION/TRAINING PROGRAM

## 2024-05-15 ASSESSMENT — ACTIVITIES OF DAILY LIVING (ADL)
ADLS_ACUITY_SCORE: 37

## 2024-05-15 ASSESSMENT — COLUMBIA-SUICIDE SEVERITY RATING SCALE - C-SSRS
6. HAVE YOU EVER DONE ANYTHING, STARTED TO DO ANYTHING, OR PREPARED TO DO ANYTHING TO END YOUR LIFE?: NO
2. HAVE YOU ACTUALLY HAD ANY THOUGHTS OF KILLING YOURSELF IN THE PAST MONTH?: NO
1. IN THE PAST MONTH, HAVE YOU WISHED YOU WERE DEAD OR WISHED YOU COULD GO TO SLEEP AND NOT WAKE UP?: NO

## 2024-05-15 NOTE — DISCHARGE INSTRUCTIONS
Continue your short acting insulin with meals as prescribed.  I am changing your long-acting insulin dose as follows:    Stop taking 66 units once a day.  Start taking 31 units at bedtime and 31 units 12 hours after bedtime.  For example if you go to bed at 10 PM and take 31 units of Lantus at 10 PM, take the other 31 units of Lantus at 10 AM the next day.    Please follow-up with your primary care doctor in the next 1 to 2 weeks for reevaluation.

## 2024-05-15 NOTE — ED PROVIDER NOTES
Emergency Department Note      History of Present Illness     Chief Complaint  Hyperglycemia    HPI  Pascual Perea is a 83 year old male with a history of hypertension, type 2 diabetes and atrial fibrillation who reports to the ED for evaluation of hyperglycemia. The patient reports with use of a continuous monitor waking up this morning with a 120 blood sugar and an increase to above a 300 after breakfast this morning. It refugio to 400 and stayed at this level for about an hour. Endorses headache, weakness, fatigue, and an ache down the left arm to the fingers. Denies nausea, vomiting, and abdominal pain. No recent sickness exposure or use of steroid medications. He experiences lower blood sugar at night. Of note took 7 units of novalog with breakfast this morning. The patient notes he has recently been struggling with maintaining his blood sugar, he is often elevated during the day, but twice in the past couple of weeks he woke to his continuous glucometer alerting him of hypoglycemia around 4 AM. Currently taking 66 units of Lantis, 3 units per 15 carbohydrates of fast acting during meals.    Says he typically goes to bed around 10 or 11 PM taking his Lantus then.     Independent Historian  None    Review of External Notes  Reviewed primary visit from 4/24. Lantis was increased to 66 units, Novolog increased by 1 unit at lunch and decreased 1 unit at dinner.   Past Medical History   Medical History and Problem List  Adhesive capsulitis of shoulder  Anemia  Atrial fibrillation  Antiplatelet or antithrombotic long-term use  Arrhythmia  CAD (coronary artery disease)  Gastroparesis  GERD  History of cardioversion  Hyperlipidemia LDL goal <70  Hypertension  Hypothyroidism  Hyperparathyroidism  Impotence of organic origin  Laceration of finger  BRANDON (obstructive sleep apnea)  Osteopenia  CKD stage 3  Pulmonary hypertension (H)  Sensorineural hearing loss, unspecified  Stented coronary artery  Steatosis of  liver  Testosterone deficiency  Type 2 diabetes mellitus without complication  (goal A1C<8)  Typical atrial flutter (H)  Unspecified hypothyroidism  Vitamin D deficiency    Medications  Eliquis   Lipitor   Lofibra   Lantus solostar   Imdur   Synthroid  Nitrostat   Demadex     Surgical History   Past Surgical History:   Procedure Laterality Date    ANESTHESIA CARDIOVERSION N/A 02/13/2019    Procedure: ANESTHESIA CARDIOVERSION;  Surgeon: GENERIC ANESTHESIA PROVIDER;  Location:  OR    ANESTHESIA CARDIOVERSION N/A 05/22/2019    Procedure: ANESTHESIA, FOR CARDIOVERSION;  Surgeon: GENERIC ANESTHESIA PROVIDER;  Location:  OR    CARDIAC SURGERY      bypass in 1997    CARDIOVERSION  04/24/2018    COLONOSCOPY      CORONARY ANGIOGRAPHY ADULT ORDER      4/2/2012    CORONARY ARTERY BYPASS  1997    [facility]    ENDOSCOPIC ULTRASOUND UPPER GASTROINTESTINAL TRACT (GI) N/A 03/14/2019    Procedure: ENDOSCOPIC ULTRASOUND OF UPPER GASTROINTESTINAL TRACT;  Surgeon: Ju Torres MD;  Location:  OR    ESOPHAGOSCOPY, GASTROSCOPY, DUODENOSCOPY (EGD), COMBINED N/A 4/15/2022    Procedure: ESOPHAGOGASTRODUODENOSCOPY (EGD);  Surgeon: Erik Baca DO;  Location:  GI    EXCISE MASS HEAD Left 08/18/2016    Procedure: EXCISE MASS HEAD;  Surgeon: Ivan Hernandez MD;  Location:  SD    HEART CATH, ANGIOPLASTY  04/2012    LAPAROSCOPIC CHOLECYSTECTOMY N/A 03/17/2019    Procedure: LAPAROSCOPIC CHOLECYSTECTOMY;  Surgeon: Janel Paz MD;  Location:  OR    PHACOEMULSIFICATION CLEAR CORNEA WITH STANDARD INTRAOCULAR LENS IMPLANT Left 06/08/2015    Procedure: PHACOEMULSIFICATION CLEAR CORNEA WITH STANDARD INTRAOCULAR LENS IMPLANT;  Surgeon: Nixon Farnsworth MD;  Location:  EC    PHACOEMULSIFICATION CLEAR CORNEA WITH STANDARD INTRAOCULAR LENS IMPLANT Right 06/22/2015    Procedure: PHACOEMULSIFICATION CLEAR CORNEA WITH STANDARD INTRAOCULAR LENS IMPLANT;  Surgeon: Nixon Farnsworth MD;   Location:  EC    SEPTOPLASTY, TURBINOPLASTY, COMBINED Bilateral 08/18/2016    Procedure: COMBINED SEPTOPLASTY, TURBINOPLASTY;  Surgeon: Ivan Hernandez MD;  Location: [facility] NONSPECIFIC PROCEDURE  1997    CABG (Religion)    [facility] NONSPECIFIC PROCEDURE  08/2001    stress echo     Physical Exam   Patient Vitals for the past 24 hrs:   BP Temp Pulse Resp SpO2 Weight   05/15/24 1216 (!) 142/73 97.1  F (36.2  C) 75 20 96 % 92.5 kg (204 lb)     Physical Exam  General: pleasant man sitting in chair. Awake, alert, in no acute distress   HEENT: Atraumatic   EOM normal   External ears normal   Trachea midline  Neck: Supple, normal ROM  CV: Regular rate, regular rhythm   No lower extremity edema  Systolic murmur  PULM: Breath sounds normal bilaterally  No wheezes or rales  ABD: Soft, non-tender, non-distended  Normal bowel sounds   No rebound or guarding   MSK: No gross deformities  NEURO: Alert, no focal deficits  Skin: Warm, dry and intact    Diagnostics   Lab Results   Labs Ordered and Resulted from Time of ED Arrival to Time of ED Departure   BASIC METABOLIC PANEL - Abnormal       Result Value    Sodium 135      Potassium 4.2      Chloride 101      Carbon Dioxide (CO2) 25      Anion Gap 9      Urea Nitrogen 24.1 (*)     Creatinine 1.47 (*)     GFR Estimate 47 (*)     Calcium 10.9 (*)     Glucose 233 (*)     Patient Fasting > 8hrs? Yes     CBC WITH PLATELETS AND DIFFERENTIAL - Abnormal    WBC Count 4.9      RBC Count 3.84 (*)     Hemoglobin 10.9 (*)     Hematocrit 33.9 (*)     MCV 88      MCH 28.4      MCHC 32.2      RDW 13.5      Platelet Count 192      % Neutrophils 69      % Lymphocytes 19      % Monocytes 8      % Eosinophils 3      % Basophils 1      % Immature Granulocytes 0      NRBCs per 100 WBC 0      Absolute Neutrophils 3.4      Absolute Lymphocytes 0.9      Absolute Monocytes 0.4      Absolute Eosinophils 0.2      Absolute Basophils 0.1      Absolute Immature Granulocytes 0.0      Absolute NRBCs 0.0      KETONE BETA-HYDROXYBUTYRATE QUANTITATIVE, RAPID - Normal    Ketone (Beta-Hydroxybutyrate) Quantitative <0.18     TSH WITH FREE T4 REFLEX - Normal    TSH 2.16         Independent Interpretation  None  ED Course    Medications Administered  Medications - No data to display    Procedures  Procedures     Discussion of Management  Clinical Pharmacist, regarding insulin regimen     Social Determinants of Health adding to complexity of care  None    ED Course  ED Course as of 05/15/24 2148   Wed May 15, 2024   1430 I obtained history and examined the patient as noted above.    1452 I spoke with pharmacy regarding his medications   1532 I rechecked and explained findings.      Medical Decision Making / Diagnosis   CMS Diagnoses: None    MIPS     None    MDM  Pascual Perea is a 83 year old male who presents with hyperglycemia this morning.  He took additional insulin prior to arrival.  His initial blood sugar here was adequate at 233 without anion gap or ketones to suggest diabetic emergency.  Considered alternative causes of labile blood sugars including hypothyroidism.  TSH is WNL.  Hemoglobin around his baseline.  And long discussion with patient and ED pharmacist, I am concerned that the increase in his Lantus is causing hypoglycemia prior to Lantus peaking (which is around 12 hours).  Despite nocturnal hypoglycemia he seems to be running high during the day.  Questionable whether he is taking quite enough sliding scale --I estimated he should have had 9 or 10 units with breakfast based on what he told me he ate but he only took 7.    Rather than adjusting his sliding scale (which is not too far off) pharmacy is recommending splitting his Lantus into a twice daily dosing regimen to provide more coverage during the day and slightly less at night (to avoid nocturnal hypoglycemia).  Will decrease total Lantus slightly back to his original 62 units but will split it into 31 units Lantus twice daily.  New prescription  sent to pharmacy as to not confuse the dosing regimen instructions.  Asking him to follow-up with his primary care doctor in the next couple of weeks for recheck.  Plan discussed with daughter and patient.  All questions answered.  Return precautions provided.  Discharged home in stable condition    Disposition  The patient was discharged.     ICD-10 Codes:    ICD-10-CM    1. Hyperglycemia  R73.9            Discharge Medications  Discharge Medication List as of 5/15/2024  3:45 PM            Scribe Disclosure:  I, Judi Dudley, am serving as a scribe at 2:43 PM on 5/15/2024 to document services personally performed by Micheline Byrd DO based on my observations and the provider's statements to me.        Micheline Bryd DO  05/15/24 4010

## 2024-05-15 NOTE — ED TRIAGE NOTES
Pt having blood sugars 300-400 this am, last check at 1144 371, took 10 extra units of insulin   Triage Assessment (Adult)       Row Name 05/15/24 1217          Triage Assessment    Airway WDL WDL        Respiratory WDL    Respiratory WDL WDL        Cardiac WDL    Cardiac WDL WDL        Cognitive/Neuro/Behavioral WDL    Cognitive/Neuro/Behavioral WDL WDL

## 2024-05-16 ENCOUNTER — MYC MEDICAL ADVICE (OUTPATIENT)
Dept: INTERNAL MEDICINE | Facility: CLINIC | Age: 84
End: 2024-05-16
Payer: COMMERCIAL

## 2024-05-16 DIAGNOSIS — I25.119 ATHEROSCLEROSIS OF NATIVE CORONARY ARTERY OF NATIVE HEART WITH ANGINA PECTORIS (H): ICD-10-CM

## 2024-05-17 RX ORDER — ISOSORBIDE MONONITRATE 30 MG/1
30 TABLET, EXTENDED RELEASE ORAL DAILY
Qty: 90 TABLET | Refills: 3 | Status: SHIPPED | OUTPATIENT
Start: 2024-05-17

## 2024-05-17 NOTE — TELEPHONE ENCOUNTER
FYI see below update regarding Isosorbide     Gladys STALLWORTH Triage RN  [facility]

## 2024-05-21 ENCOUNTER — TELEPHONE (OUTPATIENT)
Dept: INTERNAL MEDICINE | Facility: CLINIC | Age: 84
End: 2024-05-21
Payer: COMMERCIAL

## 2024-05-21 NOTE — TELEPHONE ENCOUNTER
"Per Pharmacy    \"Claim for Continuous Blood Gluc Sensor (FREESTYLE DELFINA 2 SENSOR) MISC has been rejected and requires PA.  Login to go[employer]Cell[employer]/login and enter key.    KEY  [id number]    [facility]            "

## 2024-05-22 VITALS
HEIGHT: 71 IN | DIASTOLIC BLOOD PRESSURE: 60 MMHG | TEMPERATURE: 98.3 F | WEIGHT: 208 LBS | BODY MASS INDEX: 29.12 KG/M2 | RESPIRATION RATE: 18 BRPM | SYSTOLIC BLOOD PRESSURE: 136 MMHG | HEART RATE: 82 BPM | OXYGEN SATURATION: 97 %

## 2024-05-22 PROBLEM — N18.32 STAGE 3B CHRONIC KIDNEY DISEASE (H): Status: ACTIVE | Noted: 2024-05-22

## 2024-05-22 PROBLEM — N18.32 TYPE 2 DIABETES MELLITUS WITH STAGE 3B CHRONIC KIDNEY DISEASE, WITH LONG-TERM CURRENT USE OF INSULIN (H): Status: ACTIVE | Noted: 2024-05-22

## 2024-05-22 PROBLEM — E11.22 TYPE 2 DIABETES MELLITUS WITH STAGE 3B CHRONIC KIDNEY DISEASE, WITH LONG-TERM CURRENT USE OF INSULIN (H): Status: ACTIVE | Noted: 2024-05-22

## 2024-05-22 PROBLEM — Z79.4 TYPE 2 DIABETES MELLITUS WITH STAGE 3B CHRONIC KIDNEY DISEASE, WITH LONG-TERM CURRENT USE OF INSULIN (H): Status: ACTIVE | Noted: 2024-05-22

## 2024-05-28 DIAGNOSIS — E78.5 HYPERLIPIDEMIA LDL GOAL <70: ICD-10-CM

## 2024-05-28 RX ORDER — ATORVASTATIN CALCIUM 40 MG/1
40 TABLET, FILM COATED ORAL DAILY
Qty: 90 TABLET | Refills: 2 | Status: SHIPPED | OUTPATIENT
Start: 2024-05-28

## 2024-05-31 NOTE — TELEPHONE ENCOUNTER
PA Initiation    Medication: FREESTYLE DELFINA 2 SENSOR [facility]  Insurance Company: Ann - Phone [phone] Fax [phone]  Pharmacy Filling the Rx: [facility] - [place], [place] - [address]  Filling Pharmacy Phone: [phone]  Filling Pharmacy Fax: [phone]  Start Date: 5/31/2024

## 2024-06-03 NOTE — TELEPHONE ENCOUNTER
Prior Authorization Approval    Medication: FREESTYLE DELFINA 2 SENSOR MISC  Authorization Effective Date: 6/1/2024  Authorization Expiration Date: 6/1/2027  Approved Dose/Quantity:   Reference #:     Insurance Company: Ann - Phone [phone] Fax [phone]  Expected CoPay: $    CoPay Card Available:      Financial Assistance Needed:   Which Pharmacy is filling the prescription: [facility] - [place], [place] - [address]  Pharmacy Notified: YES  Patient Notified: **Instructed pharmacy to notify patient when script is ready to /ship.**

## 2024-06-05 ENCOUNTER — LAB (OUTPATIENT)
Dept: LAB | Facility: CLINIC | Age: 84
End: 2024-06-05
Payer: COMMERCIAL

## 2024-06-05 DIAGNOSIS — E11.22 TYPE 2 DIABETES MELLITUS WITH STAGE 3B CHRONIC KIDNEY DISEASE, WITH LONG-TERM CURRENT USE OF INSULIN (H): ICD-10-CM

## 2024-06-05 DIAGNOSIS — E03.9 HYPOTHYROIDISM, UNSPECIFIED TYPE: ICD-10-CM

## 2024-06-05 DIAGNOSIS — N18.32 TYPE 2 DIABETES MELLITUS WITH STAGE 3B CHRONIC KIDNEY DISEASE, WITH LONG-TERM CURRENT USE OF INSULIN (H): ICD-10-CM

## 2024-06-05 DIAGNOSIS — E83.52 HYPERCALCEMIA: ICD-10-CM

## 2024-06-05 DIAGNOSIS — Z79.4 TYPE 2 DIABETES MELLITUS WITH STAGE 3B CHRONIC KIDNEY DISEASE, WITH LONG-TERM CURRENT USE OF INSULIN (H): ICD-10-CM

## 2024-06-05 DIAGNOSIS — N18.32 STAGE 3B CHRONIC KIDNEY DISEASE (H): ICD-10-CM

## 2024-06-05 PROCEDURE — 80048 BASIC METABOLIC PNL TOTAL CA: CPT

## 2024-06-05 PROCEDURE — 84443 ASSAY THYROID STIM HORMONE: CPT

## 2024-06-05 PROCEDURE — 36415 COLL VENOUS BLD VENIPUNCTURE: CPT

## 2024-06-06 ENCOUNTER — MYC MEDICAL ADVICE (OUTPATIENT)
Dept: INTERNAL MEDICINE | Facility: CLINIC | Age: 84
End: 2024-06-06
Payer: COMMERCIAL

## 2024-06-06 LAB
ANION GAP SERPL CALCULATED.3IONS-SCNC: 11 MMOL/L (ref 7–15)
BUN SERPL-MCNC: 22.5 MG/DL (ref 8–23)
CALCIUM SERPL-MCNC: 10.6 MG/DL (ref 8.8–10.2)
CHLORIDE SERPL-SCNC: 102 MMOL/L (ref 98–107)
CREAT SERPL-MCNC: 1.54 MG/DL (ref 0.67–1.17)
DEPRECATED HCO3 PLAS-SCNC: 26 MMOL/L (ref 22–29)
EGFRCR SERPLBLD CKD-EPI 2021: 45 ML/MIN/1.73M2
GLUCOSE SERPL-MCNC: 209 MG/DL (ref 70–99)
POTASSIUM SERPL-SCNC: 4.6 MMOL/L (ref 3.4–5.3)
SODIUM SERPL-SCNC: 139 MMOL/L (ref 135–145)
TSH SERPL DL<=0.005 MIU/L-ACNC: 2.59 UIU/ML (ref 0.3–4.2)

## 2024-06-20 ENCOUNTER — TELEPHONE (OUTPATIENT)
Dept: INTERNAL MEDICINE | Facility: CLINIC | Age: 84
End: 2024-06-20

## 2024-06-20 ENCOUNTER — OFFICE VISIT (OUTPATIENT)
Dept: URGENT CARE | Facility: URGENT CARE | Age: 84
End: 2024-06-20
Payer: COMMERCIAL

## 2024-06-20 ENCOUNTER — HOSPITAL ENCOUNTER (OUTPATIENT)
Dept: CT IMAGING | Facility: CLINIC | Age: 84
Discharge: HOME | End: 2024-06-20
Attending: PHYSICIAN ASSISTANT | Admitting: PHYSICIAN ASSISTANT
Payer: COMMERCIAL

## 2024-06-20 VITALS
HEART RATE: 63 BPM | SYSTOLIC BLOOD PRESSURE: 150 MMHG | DIASTOLIC BLOOD PRESSURE: 67 MMHG | TEMPERATURE: 98.2 F | RESPIRATION RATE: 18 BRPM | OXYGEN SATURATION: 96 %

## 2024-06-20 DIAGNOSIS — C7A.1 MALIGNANT POORLY DIFFERENTIATED NEUROENDOCRINE CARCINOMA (H): Primary | ICD-10-CM

## 2024-06-20 DIAGNOSIS — K11.8 MASS OF LEFT PAROTID GLAND: Primary | ICD-10-CM

## 2024-06-20 DIAGNOSIS — R22.1 MASS OF LEFT SIDE OF NECK: ICD-10-CM

## 2024-06-20 DIAGNOSIS — K11.8 MASS OF LEFT PAROTID GLAND: ICD-10-CM

## 2024-06-20 DIAGNOSIS — K11.1 PAROTID GLAND ENLARGEMENT: Primary | ICD-10-CM

## 2024-06-20 LAB — RADIOLOGIST FLAGS: ABNORMAL

## 2024-06-20 PROCEDURE — 250N000011 HC RX IP 250 OP 636: Performed by: PHYSICIAN ASSISTANT

## 2024-06-20 PROCEDURE — 99214 OFFICE O/P EST MOD 30 MIN: CPT | Performed by: PHYSICIAN ASSISTANT

## 2024-06-20 PROCEDURE — 70491 CT SOFT TISSUE NECK W/DYE: CPT

## 2024-06-20 PROCEDURE — 250N000009 HC RX 250: Performed by: PHYSICIAN ASSISTANT

## 2024-06-20 RX ORDER — IOPAMIDOL 755 MG/ML
90 INJECTION, SOLUTION INTRAVASCULAR ONCE
Status: COMPLETED | OUTPATIENT
Start: 2024-06-20 | End: 2024-06-20

## 2024-06-20 RX ADMIN — IOPAMIDOL 90 ML: 755 INJECTION, SOLUTION INTRAVENOUS at 13:59

## 2024-06-20 RX ADMIN — SODIUM CHLORIDE 60 ML: 9 INJECTION, SOLUTION INTRAVENOUS at 13:59

## 2024-06-20 ASSESSMENT — PAIN SCALES - GENERAL: PAINLEVEL: NO PAIN (0)

## 2024-06-20 ASSESSMENT — ENCOUNTER SYMPTOMS: FACIAL SWELLING: 1

## 2024-06-20 NOTE — TELEPHONE ENCOUNTER
Thu with imaging called with an urgent finding from CT soft tissue of neck from today 6/20/24.     IMPRESSION:   1. 2.7 x 2.2 x 2.8 cm muscle isodense mass in the superficial left  parotid gland. This may represent a primary salivary gland tumor or  enlarged intraparotid lymph node. Recommend biopsy.     Routing to both PCP as imaging can't directly call UC provider and routing to UC providers.     Please advise.

## 2024-06-20 NOTE — TELEPHONE ENCOUNTER
Called urgent care provider as noted.  CT scan reviewed.  Called and discussed with patient.  Also called and discussed with radiology and it appears that this lesion is amendable to ultrasound-guided biopsy.  Orders were placed for such.  Informed patient that he may be receiving a phone call to get this scheduled and to contact us if no phone call is obtained.  Will route to primary care provider for follow-up.

## 2024-06-20 NOTE — TELEPHONE ENCOUNTER
Provider was transferred to [place] RN.    Urgent Care Provider is requesting to speak with PCP.    Please call Norah at [phone] ( [facility]).      Sincerely,    RN Triage  [place], [place]

## 2024-06-24 DIAGNOSIS — E78.5 HYPERLIPIDEMIA LDL GOAL <70: ICD-10-CM

## 2024-06-25 RX ORDER — FENOFIBRATE 67 MG/1
CAPSULE ORAL
Qty: 90 CAPSULE | Refills: 3 | Status: SHIPPED | OUTPATIENT
Start: 2024-06-25

## 2024-06-28 ENCOUNTER — HOSPITAL ENCOUNTER (OUTPATIENT)
Dept: ULTRASOUND IMAGING | Facility: CLINIC | Age: 84
Discharge: HOME | End: 2024-06-28
Attending: INTERNAL MEDICINE | Admitting: INTERNAL MEDICINE
Payer: COMMERCIAL

## 2024-06-28 DIAGNOSIS — K11.1 PAROTID GLAND ENLARGEMENT: ICD-10-CM

## 2024-06-28 PROCEDURE — 88360 TUMOR IMMUNOHISTOCHEM/MANUAL: CPT | Mod: TC | Performed by: INTERNAL MEDICINE

## 2024-06-28 PROCEDURE — 88189 FLOWCYTOMETRY/READ 16 & >: CPT | Mod: GC | Performed by: STUDENT IN AN ORGANIZED HEALTH CARE EDUCATION/TRAINING PROGRAM

## 2024-06-28 PROCEDURE — 88184 FLOWCYTOMETRY/ TC 1 MARKER: CPT | Performed by: INTERNAL MEDICINE

## 2024-06-28 PROCEDURE — 250N000009 HC RX 250: Performed by: RADIOLOGY

## 2024-06-28 PROCEDURE — 272N000431 US BIOPSY CORE LYMPH NODE

## 2024-06-28 PROCEDURE — 88185 FLOWCYTOMETRY/TC ADD-ON: CPT | Performed by: INTERNAL MEDICINE

## 2024-06-28 RX ORDER — LIDOCAINE HYDROCHLORIDE 10 MG/ML
10 INJECTION, SOLUTION EPIDURAL; INFILTRATION; INTRACAUDAL; PERINEURAL ONCE
Status: COMPLETED | OUTPATIENT
Start: 2024-06-28 | End: 2024-06-28

## 2024-06-28 RX ADMIN — LIDOCAINE HYDROCHLORIDE 10 ML: 10 INJECTION, SOLUTION EPIDURAL; INFILTRATION; INTRACAUDAL; PERINEURAL at 09:45

## 2024-07-01 DIAGNOSIS — N18.32 TYPE 2 DIABETES MELLITUS WITH STAGE 3B CHRONIC KIDNEY DISEASE, WITH LONG-TERM CURRENT USE OF INSULIN (H): Primary | ICD-10-CM

## 2024-07-01 DIAGNOSIS — E11.22 TYPE 2 DIABETES MELLITUS WITH STAGE 3B CHRONIC KIDNEY DISEASE, WITH LONG-TERM CURRENT USE OF INSULIN (H): Primary | ICD-10-CM

## 2024-07-01 DIAGNOSIS — Z79.4 TYPE 2 DIABETES MELLITUS WITH STAGE 3B CHRONIC KIDNEY DISEASE, WITH LONG-TERM CURRENT USE OF INSULIN (H): Primary | ICD-10-CM

## 2024-07-02 ENCOUNTER — PATIENT OUTREACH (OUTPATIENT)
Dept: CARE COORDINATION | Facility: CLINIC | Age: 84
End: 2024-07-02
Payer: COMMERCIAL

## 2024-07-02 LAB
PATH REPORT.COMMENTS IMP SPEC: NORMAL
PATH REPORT.FINAL DX SPEC: NORMAL
PATH REPORT.MICROSCOPIC SPEC OTHER STN: NORMAL
PATH REPORT.RELEVANT HX SPEC: NORMAL

## 2024-07-02 RX ORDER — INSULIN GLARGINE 100 [IU]/ML
INJECTION, SOLUTION SUBCUTANEOUS
Qty: 30 ML | Refills: 4 | Status: SHIPPED | OUTPATIENT
Start: 2024-07-02

## 2024-07-03 LAB
PATH REPORT.COMMENTS IMP SPEC: ABNORMAL
PATH REPORT.COMMENTS IMP SPEC: YES
PATH REPORT.FINAL DX SPEC: ABNORMAL
PATH REPORT.GROSS SPEC: ABNORMAL
PATH REPORT.MICROSCOPIC SPEC OTHER STN: ABNORMAL
PATH REPORT.RELEVANT HX SPEC: ABNORMAL
PHOTO IMAGE: ABNORMAL

## 2024-07-03 PROCEDURE — 88360 TUMOR IMMUNOHISTOCHEM/MANUAL: CPT | Mod: 26 | Performed by: PATHOLOGY

## 2024-07-03 PROCEDURE — 88342 IMHCHEM/IMCYTCHM 1ST ANTB: CPT | Mod: 26 | Performed by: PATHOLOGY

## 2024-07-03 PROCEDURE — 88305 TISSUE EXAM BY PATHOLOGIST: CPT | Mod: 26 | Performed by: PATHOLOGY

## 2024-07-03 PROCEDURE — 88341 IMHCHEM/IMCYTCHM EA ADD ANTB: CPT | Mod: 26 | Performed by: PATHOLOGY

## 2024-07-03 NOTE — TELEPHONE ENCOUNTER
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 NA. LM I called and that I will try calling again Friday when clinic reopens.  Will refer to ENT and Oncology for further eval and treat and  will defer further imaging to them. Since I was not able to taqk with pt yet, referral orders not signed so that pt doesn't get a call from central scheduling prior to me discussing findings with him

## 2024-07-05 NOTE — TELEPHONE ENCOUNTER
Spoke with pt and discussed results of bx. Will need further imaging of chest/abd to look for primary source. Unclear if oncology will want pt to have standard CT with contrast vs PET scan. Oncology clinic closed now.  Informed pt I will speak with Oncology Monday morning and then place further imaging orders at that time. Informed pt should expect to hear from schedulers for referrals next Monday and I will update him re: the imaging recommended by Oncology

## 2024-07-08 ENCOUNTER — DOCUMENTATION ONLY (OUTPATIENT)
Dept: LAB | Facility: CLINIC | Age: 84
End: 2024-07-08
Payer: COMMERCIAL

## 2024-07-08 DIAGNOSIS — C07 MALIGNANT NEOPLASM OF PAROTID GLAND (H): ICD-10-CM

## 2024-07-08 DIAGNOSIS — N18.32 TYPE 2 DIABETES MELLITUS WITH STAGE 3B CHRONIC KIDNEY DISEASE, WITH LONG-TERM CURRENT USE OF INSULIN (H): ICD-10-CM

## 2024-07-08 DIAGNOSIS — C79.9 METASTATIC CARCINOMA (H): Primary | ICD-10-CM

## 2024-07-08 DIAGNOSIS — N18.32 STAGE 3B CHRONIC KIDNEY DISEASE (H): Primary | ICD-10-CM

## 2024-07-08 DIAGNOSIS — Z79.4 TYPE 2 DIABETES MELLITUS WITH STAGE 3B CHRONIC KIDNEY DISEASE, WITH LONG-TERM CURRENT USE OF INSULIN (H): ICD-10-CM

## 2024-07-08 DIAGNOSIS — D64.9 ANEMIA, UNSPECIFIED TYPE: ICD-10-CM

## 2024-07-08 DIAGNOSIS — E11.22 TYPE 2 DIABETES MELLITUS WITH STAGE 3B CHRONIC KIDNEY DISEASE, WITH LONG-TERM CURRENT USE OF INSULIN (H): ICD-10-CM

## 2024-07-08 NOTE — PROGRESS NOTES
Pascual NASRIN Perea has an upcoming lab appointment:    Future Appointments   Date Time Provider Department Center   7/16/2024  9:00 AM [facility]     Patient is scheduled for the following lab(s):   Scheduling Notes: NA   Made On: 4/25/2024 5:27 PM By: AB ESQUIVEL     There is no order available. Please review and place either future orders or HMPO (Review of Health Maintenance Protocol Orders), as appropriate.    Health Maintenance Due   Topic    ANNUAL REVIEW OF HM ORDERS      If no labs are needed at this time please have the Care Team contact patient regarding this information and cancel lab appointment. Thank you.     Mame RIVERO

## 2024-07-08 NOTE — PROGRESS NOTES
Dr. Elizalde called me.  Patient has newly diagnosed malignancy.  Will get PET scan and see the patient back in the clinic after that.    CT scan:  IMPRESSION:   1. 2.7 x 2.2 x 2.8 cm muscle isodense mass in the superficial left  parotid gland. This may represent a primary salivary gland tumor or  enlarged intraparotid lymph node. Recommend biopsy.  2. Multiple enlarged left-sided cervical lymph nodes in level II-IV,  largest measuring 3.4 x 3.0 cm in the level IIa. The largest lymph  node does not have fat plane between the overlying SCM muscle,  suspicious for invasion. These can be metastatic if the left parotid  mass is a primary malignant salivary gland tumor. Other less likely  differential include lymphoproliferative disease. Recommend biopsy.    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

## 2024-07-09 ENCOUNTER — TELEPHONE (OUTPATIENT)
Dept: OTOLARYNGOLOGY | Facility: CLINIC | Age: 84
End: 2024-07-09
Payer: COMMERCIAL

## 2024-07-09 ENCOUNTER — PATIENT OUTREACH (OUTPATIENT)
Dept: ONCOLOGY | Facility: CLINIC | Age: 84
End: 2024-07-09
Payer: COMMERCIAL

## 2024-07-09 NOTE — TELEPHONE ENCOUNTER
Spoke with Dr Butler yesterday re: pt and he ordered PET scan scheduled for 7/30/24 and his staff will be scheduling Oncology appt after  pt mets with ENT per outreach note in chart from today and pt scheduled for ENT appt 7/15/24. Discussed all with pt yesterday also

## 2024-07-09 NOTE — TELEPHONE ENCOUNTER
Patient confirmed scheduled appointment:  Date: 7/15  Time: 10:20am  Visit type: New ENT   Provider: Dr. Latif  [place] [facility] [place]:   Additional notes:

## 2024-07-09 NOTE — TELEPHONE ENCOUNTER
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  1st floor, Room [address]  [facility] [address]  24- Case: [id number] - Lymph Nodes, Neck, left     [employer] 24- request     Received path 24    procedures 4/15/22- EGD [facility]       2024 2:57 PM - Faxed a request to [facility] for Path - Chelsea Aguilar 2024 9:14 AM Chelsea Aguilar Taken Slides from [facility] received and taken to 5th floor path lab for review.

## 2024-07-09 NOTE — PROGRESS NOTES
New Patient Oncology Nurse Navigator Note     [facility]     Referred to (specialty): Medical Oncology    Requested provider (if applicable): n/a     Date Referral Received: 2024     Evaluation for : Malignant poorly differentiated neuroendocrine carcinoma (H) K11.8 (ICD-10-CM)Mass of left parotid gland     Clinical History (per Nurse review of records provided):    **BOOK MARKED**   NOTES:  7/15/2024:  Scheduled with Dr. 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

## 2024-07-09 NOTE — PROGRESS NOTES
I called and spoke to Dayday.  I explained my role and purpose of my call.  I let him know that I have rec'd the referral for him to meet with medical oncology, however, he first needs to see ENT to determine if any surgery would be indicated. I explained I will hold onto his referral and monitor his chart to see what Dr. Latif decides on MON 7/15 when he consults with her.    I gave him my direct contact information for any further questions he may have prior to us chatting again.

## 2024-07-12 NOTE — PROGRESS NOTES
Dear Dr. Elizalde:    I had the pleasure of meeting Pascual Perea in consultation today at the [facility] at your request.     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     He was seen in primary care on 6/20/2024 with a progressive right neck mass present for 1 day but increasing in size. He was recommended for a CT neck which was performed on 6/20/2024 and demonstrated a 2.7 x 2.2 x 2.8 cm mass in the left superficial parotid and multiple left level II-IV lymph nodes, largest 3.4 x 3.0 cm with no fat plane with SCM. 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 He has a PET scan scheduled on 7/19/2024.    He says that it is increasing in size. He has some discomfort in it. He does not require any medication for the pain. He has no other masses. He has no swallowing issues. He has a sore throat all the time which he attributes to his CPAP. He says nothing tastes good in the last month and has a decreased appetite as a result. He has lost about 6 lbs about the last 3-5 weeks. He has no voice changes. He has shortness of breath which he attributes to his heart failure. He has no ear pain.     He has no history of skin cancers.     He is accompanied by his daughter.       Past medical history: CKD, diabetes, CAD, hypothyroidism, atrial fibrillation, chronic anticoagulation, hyperlipidemia, heart failure    Past surgical history: cholecystectomy, bypass surgery    Social history: No smoking history, rare cigar. No chewing tobacco. No alcohol. Lives with son. Retired - .    Family history: Niece had tongue cancer and maybe Hodgkin lymphoma    MEDICATIONS:     Current Outpatient Medications   Medication Sig Dispense Refill     apixaban ANTICOAGULANT (ELIQUIS ANTICOAGULANT) 2.5 MG tablet Take 1 tablet (2.5 mg) by mouth 2 times daily 180 tablet 3    atorvastatin (LIPITOR) 40 MG tablet TAKE 1 TABLET (40 MG) BY MOUTH DAILY 90 tablet 2    azelastine (ASTELIN) 0.1 % nasal spray USE 2 SPRAYS INTO BOTH NOSTRILS 2 TIMES A DAY 90 mL 3    blood glucose (ACCU-CHEK NORMAN PLUS) test strip USE TO TEST BLOOD SUGAR 3 TIMES A DAY OR AS DIRECTED 100 strip 11    blood glucose calibration (ACCU-CHEK NORMAN) solution USE AS DIRECTED 1 each 0    Continuous Blood Gluc  (FREESTYLE DELFINA 2 READER) CHRYSTAL 1 Device continuous 1 each 1    Continuous Blood Gluc Sensor (FREESTYLE DELFINA 2 SENSOR) [facility] APPLY 1 SENSOR AND CHANGE EVERY 14 DAYS AS DIRECTED 6 each 3    fenofibrate micronized (LOFIBRA) 67 MG capsule TAKE 1 CAPSULE BY MOUTH EVERY MORNING BEFORE BREAKFAST 90 capsule 3    glucose 4 g CHEW Take 1 tablet by mouth every hour as needed for low blood sugar       Insulin Aspart FlexPen 100 UNIT/ML SOPN Inject 6-10 Units Subcutaneous 3 times daily (with meals) Plus priming      insulin glargine (LANTUS SOLOSTAR) 100 UNIT/ML pen INJECT 31 UNITS UNDER THE SKIN EVERY 12 HOURS 30 mL 4    insulin pen needle (GLOBAL EASE INJECT PEN NEEDLES) 31G X 5 MM miscellaneous USE 1 PENTIP AS DIRECTED 4 TIMES A  each 3    isosorbide mononitrate (IMDUR) 30 MG 24 hr tablet Take 1 tablet (30 mg) by mouth daily 90 tablet 3    levothyroxine (SYNTHROID/LEVOTHROID) 125 MCG tablet Take 1 tablet (125 mcg) by mouth daily 90 tablet 3    nitroGLYcerin (NITROSTAT) 0.4 MG sublingual tablet Place 1 tablet (0.4 mg) under the tongue every 5 minutes as needed for chest pain 25 tablet 3    torsemide (DEMADEX) 10 MG tablet Take 2 tablets (20 mg) by mouth daily 180 tablet 3       ALLERGIES:    Allergies   Allergen Reactions    Omeprazole      diarrhea    Pantoprazole      diarrhea       HABITS/SOCIAL HISTORY:   No smoking history, rare cigar. No chewing tobacco. No alcohol.   Lives with son.    Retired - .    Social History     Socioeconomic History    Marital status:      Spouse name: Not on file    Number of children: Not on file    Years of education: Not on file    Highest education level: Not on file   Occupational History    Not on file   Tobacco Use    Smoking status: Former     Current packs/day: 0.00     Average packs/day: 1 pack/day for 6.5 years (6.5 ttl pk-yrs)     Types: Cigarettes, Cigars, Pipe     Start date:      Quit date: 1964     Years since quittin.0    Smokeless tobacco: Never   Vaping Use    Vaping status: Never Used   Substance and Sexual Activity    Alcohol use: Not Currently     Comment: couple drinks a year    Drug use: No    Sexual activity: Yes     Partners: Female     Birth control/protection: Abstinence   Other Topics Concern    Parent/sibling w/ CABG, MI or angioplasty before 65F 55M? No     Service Not Asked    Blood Transfusions Not Asked    Caffeine Concern Yes     Comment: 2 cups coffee a day    Occupational Exposure Not Asked    Hobby Hazards Not Asked    Sleep Concern Yes     Comment: sleep apnea, wears cpap off and on    Stress Concern No    Weight Concern No    Special Diet Yes     Comment: diabetic diet     Back Care Not Asked    Exercise No     Comment: occ walk the mall    Bike Helmet Not Asked    Seat Belt Yes    Self-Exams Not Asked   Social History Narrative    Not on file     Social Determinants of Health     Financial Resource Strain: Low Risk  (2024)    Financial Resource Strain     Within the past 12 months, have you or your family members you live with been unable to get utilities (heat, electricity) when it was really needed?: No   Food Insecurity: Low Risk  (2024)    Food Insecurity     Within the past 12 months, did you worry that your food would run out before you got money to buy more?: No     Within the past 12 months, did the food you bought just not last and you didn t have money to get more?:  No   Transportation Needs: Low Risk  (4/19/2024)    Transportation Needs     Within the past 12 months, has lack of transportation kept you from medical appointments, getting your medicines, non-medical meetings or appointments, work, or from getting things that you need?: No   Physical Activity: Insufficiently Active (4/19/2024)    Exercise Vital Sign     Days of Exercise per Week: 3 days     Minutes of Exercise per Session: 30 min   Stress: No Stress Concern Present (4/19/2024)    [place] [employer] [place] [employer] - Occupational Stress Questionnaire     Feeling of Stress : Not at all   Social Connections: Unknown (4/19/2024)    Social Connection and Isolation Panel [NHANES]     Frequency of Communication with Friends and Family: Not on file     Frequency of Social Gatherings with Friends and Family: Once a week     Attends Jainism Services: Not on file     Active Member of Clubs or Organizations: Not on file     Attends Club or Organization Meetings: Not on file     Marital Status: Not on file   Interpersonal Safety: Low Risk  (4/24/2024)    Interpersonal Safety     Do you feel physically and emotionally safe where you currently live?: Yes     Within the past 12 months, have you been hit, slapped, kicked or otherwise physically hurt by someone?: No     Within the past 12 months, have you been humiliated or emotionally abused in other ways by your partner or ex-partner?: No   Housing Stability: Low Risk  (4/19/2024)    Housing Stability     Do you have housing? : Yes     Are you worried about losing your housing?: No       PAST MEDICAL HISTORY:   Past Medical History:   Diagnosis Date    Adhesive capsulitis of shoulder     Anemia 03/10/2014    Anemia, unspecified     Antiplatelet or antithrombotic long-term use     Arrhythmia     Atrial fib/flutter    CAD (coronary artery disease)     Gastroparesis     delayed emptying study May 2022    History of cardioversion 04/24/2018    a single synchronized shock of  120 joules restored normal sinus rhythm    History of cardioversion 02/13/2019    single shock , 200 joules successul in restoring NSR    Hyperlipidemia LDL goal <70 05/26/2011    Hypertension     Hypothyroidism     Impotence of organic origin     Laceration of finger 06/2010     left thumb s/p sutures    Numbness and tingling     diabetic neuropathy bilateral feet    BRANDON (obstructive sleep apnea)     intolerant of CPAP, uses it occasionally.  Using mandibular device occasionally    Osteopenia     Pulmonary hypertension (H) 04/06/2012    Sensorineural hearing loss, unspecified     Stented coronary artery 1997    CABG     Testosterone deficiency     Type 2 diabetes mellitus without complication  (goal A1C<8) 10/24/2015    Typical atrial flutter (H) 04/18/2016    Unspecified hypothyroidism     Vitamin D deficiency 09/03/2011        PAST SURGICAL HISTORY:   Past Surgical History:   Procedure Laterality Date    ANESTHESIA CARDIOVERSION N/A 02/13/2019    Procedure: ANESTHESIA CARDIOVERSION;  Surgeon: GENERIC ANESTHESIA PROVIDER;  Location:  OR    ANESTHESIA CARDIOVERSION N/A 05/22/2019    Procedure: ANESTHESIA, FOR CARDIOVERSION;  Surgeon: GENERIC ANESTHESIA PROVIDER;  Location:  OR    CARDIAC SURGERY      bypass in 1997    CARDIOVERSION  04/24/2018    COLONOSCOPY      CORONARY ANGIOGRAPHY ADULT ORDER      4/2/2012    CORONARY ARTERY BYPASS  1997    [facility]    ENDOSCOPIC ULTRASOUND UPPER GASTROINTESTINAL TRACT (GI) N/A 03/14/2019    Procedure: ENDOSCOPIC ULTRASOUND OF UPPER GASTROINTESTINAL TRACT;  Surgeon: Ju Torres MD;  Location:  OR    ESOPHAGOSCOPY, GASTROSCOPY, DUODENOSCOPY (EGD), COMBINED N/A 4/15/2022    Procedure: ESOPHAGOGASTRODUODENOSCOPY (EGD);  Surgeon: Erik Baca DO;  Location:  GI    EXCISE MASS HEAD Left 08/18/2016    Procedure: EXCISE MASS HEAD;  Surgeon: Ivan Hernandez MD;  Location:  SD    HEART CATH, ANGIOPLASTY  04/2012    LAPAROSCOPIC  "CHOLECYSTECTOMY N/A 03/17/2019    Procedure: LAPAROSCOPIC CHOLECYSTECTOMY;  Surgeon: Janel Paz MD;  Location:  OR    PHACOEMULSIFICATION CLEAR CORNEA WITH STANDARD INTRAOCULAR LENS IMPLANT Left 06/08/2015    Procedure: PHACOEMULSIFICATION CLEAR CORNEA WITH STANDARD INTRAOCULAR LENS IMPLANT;  Surgeon: Nixon Farnsworth MD;  Location:  EC    PHACOEMULSIFICATION CLEAR CORNEA WITH STANDARD INTRAOCULAR LENS IMPLANT Right 06/22/2015    Procedure: PHACOEMULSIFICATION CLEAR CORNEA WITH STANDARD INTRAOCULAR LENS IMPLANT;  Surgeon: Nixon Farnsworth MD;  Location:  EC    SEPTOPLASTY, TURBINOPLASTY, COMBINED Bilateral 08/18/2016    Procedure: COMBINED SEPTOPLASTY, TURBINOPLASTY;  Surgeon: Ivan Hernandez MD;  Location:  SD    [facility] NONSPECIFIC PROCEDURE  1997    CABG ([facility])    [facility] NONSPECIFIC PROCEDURE  08/2001    stress echo       FAMILY HISTORY:    Family History   Problem Relation Age of Onset    Genitourinary Problems Father         d: age 89; ARF    Hypertension Father     Diabetes Mother         d: age 68, CAD    Heart Disease Mother         MI    Myocardial Infarction Mother     Cardiovascular Brother         d:  age 31; CAD    Heart Disease Brother         age 31, MI    Diabetes Sister         b:  1939; DM2    Diabetes Sister        REVIEW OF SYSTEMS:  12 point ROS was negative other than the symptoms noted above in the HPI.  Patient Supplied Answers to Review of Systems      7/10/2024     6:13 PM   [facility] ENT ROS   Constitutional Weight loss    Appetite change    Unexplained fatigue   Neurology Unexplained weakness   Eyes Visual loss   Ears, Nose, Throat Hearing loss   Cardiopulmonary Breathing problems    Chest pain   Musculoskeletal Sore or stiff joints    Back pain    Neck pain   Skin Skin lesions         PHYSICAL EXAMINATION:   /64   Pulse 76   Ht 1.803 m (5' 11\")   Wt 91.2 kg (201 lb)   SpO2 97%   BMI 28.03 kg/m    Appearance:   normal; NAD, age-appropriate appearance, " well-developed, normal habitus   Communication:   normal; communicates verbally, normal voice quality   Head/Face:   inspection -  visible left parotid mass   Palpation - no facial numbness   Salivary glands -  large about 3 cm parotid mass on left side, no overlying skin involvement; second mass along inferior right parotid tail   Facial strength -  Normal and symmetric bilateral; H/B I/VI   Skin:  normal, no rash   Ears:  auricle (AD) -  normal, BTE HA  EAC (AD) -  normal  TM (AD) -  Normal, no effusion  auricle (AS) -  normal, BTE HA  EAC (AS) -  normal  TM (AS) -  Normal, no effusion  Decreased clinical speech reception   Nose:  Ext. inspection -  Normal  Internal Inspection -  Normal mucosa, septum, and turbinates   Nasopharynx:  Normal mucosa, no masses   Oral Cavity:  lips -  Normal mucosa, oral competence, and stoma size   Age-appropriate dentition, healthy gingival mucosa   Hard palate, buccal, floor of mouth mucosa normal   Tongue - normal movement, no lesions   Oropharynx:  mucosa -  Normal, no lesions  soft palate -  Normal, no lesions, no asymmetry, normal elevation  tonsils -  Normal, no exudates, no abnormal lesions, symmetric  Base of tongue - normal mucosa, no masses  Vallecula - clear   Hypopharynx:  Normal pyriform sinus and pharyngeal wall mucosa   No pooled secretions    Larynx:  Epiglottis, AE folds, false vocal cords, true vocal cords, arytenoids normal in appearance, bilaterally mobile cords    Neck: Visible left neck level II mass  Left level II mass of about 4-5 cm in size, inseparable from SCM   Lymphatic:  no abnormal nodes   Cardiovascular:  warm, pink, well-perfused extremities without swelling, tenderness, or edema   Respiratory:  Normal respiratory effort, no stridor   Neuro/Psych.:  mood/affect -  normal  mental status -  normal          PROCEDURES:   Flexible fiberoptic laryngoscopy: Scope exam was indicated due to metastatic carcinoma of unknown primary. Verbal consent was  obtained. The nasal cavity was prepped with an aerosolized solution of topical anesthetic and vasoconstrictive agent. The scope was passed through the anterior nasal cavity and advanced. Inspection of the nasopharynx revealed no gross abnormality. The base of tongue and vallecula are normal. The epiglottis, AE folds, false cords, true cords, arytenoids are normal. Inspection of the larynx revealed bilaterally mobile vocal cords. Pyriform sinuses are symmetric. The airway is patent. Procedure tolerated well with no immediate complications noted.    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     He has PET scan scheduled for later this week. Will need to see the results of this to determine next steps/recommendations especially in context of identification of primary site and other metastatic disease which will dictate care.    His path slides have been requested, we discussed the importance of the slides being reviewed with our [facility] pathology team before will finalize recommendations as review could change diagnosis/treatment.    We will review imaging at [facility] tumor board on Friday.    His CKD will certainly complicate any systemic treatment recommendations along with his baseline hearing loss along with his less common primary tumor diagnosis. We discussed that chemo can impact both of these issues. These complexities will need to be considered before any systemic treatment.    He may qualify for the [facility] if he has his treatment here at the [facility]. This housing option was discussed. We discussed that he can use this for during treatment but also for appointments.     We discussed that treatment recommendations will be made after PET scan,  path review and tumor board discussion. We discussed that these are critical prior to starting any treatment to ensure that appropriate treatment recommendations are provided.    Thank you very much for the opportunity to participate in the care of your patient.      Antonietta Latif MD  Otolaryngology- Head & Neck Surgery      This note was dictated with voice recognition software and then edited. Please excuse any unintentional errors.       CC:  Andrew Elizalde MD  [address] [place] [phone]

## 2024-07-14 NOTE — TELEPHONE ENCOUNTER
RECORDS STATUS - ALL OTHER DIAGNOSIS      RECORDS RECEIVED FROM: [facility]   NOTES STATUS DETAILS   OFFICE NOTE from referring provider Epic 04/24/24: Dr. Andrew Elizalde   OFFICE NOTE from other specialist Epic 07/15/24: Dr. Antonietta Latif   MEDICATION LIST [facility]    LABS     PATHOLOGY REPORTS Report  in [facility] Surg Path:  06/28/24: [id number]    Flow Cytom:  06/28/24: [id number]   ANYTHING RELATED TO DIAGNOSIS Epic Most recent 06/05/24   IMAGING (NEED IMAGES & REPORT)     CT SCANS PACS 06/20/24: CT Soft Tissue   ULTRASOUND PACS 06/28/24: US Core Lymph Node   PET  07/19/24: PET CT Eyes to Thighs

## 2024-07-15 ENCOUNTER — OFFICE VISIT (OUTPATIENT)
Dept: OTOLARYNGOLOGY | Facility: CLINIC | Age: 84
End: 2024-07-15
Payer: COMMERCIAL

## 2024-07-15 ENCOUNTER — PRE VISIT (OUTPATIENT)
Dept: OTOLARYNGOLOGY | Facility: CLINIC | Age: 84
End: 2024-07-15

## 2024-07-15 VITALS
HEIGHT: 71 IN | BODY MASS INDEX: 28.14 KG/M2 | SYSTOLIC BLOOD PRESSURE: 133 MMHG | WEIGHT: 201 LBS | OXYGEN SATURATION: 97 % | DIASTOLIC BLOOD PRESSURE: 64 MMHG | HEART RATE: 76 BPM

## 2024-07-15 DIAGNOSIS — C77.0 SECONDARY MALIGNANCY OF LYMPH NODES OF HEAD, FACE AND NECK (H): Primary | ICD-10-CM

## 2024-07-15 PROCEDURE — 99204 OFFICE O/P NEW MOD 45 MIN: CPT | Mod: 25 | Performed by: OTOLARYNGOLOGY

## 2024-07-15 PROCEDURE — 31575 DIAGNOSTIC LARYNGOSCOPY: CPT | Performed by: OTOLARYNGOLOGY

## 2024-07-15 ASSESSMENT — PAIN SCALES - GENERAL: PAINLEVEL: NO PAIN (0)

## 2024-07-15 NOTE — LETTER
7/15/2024       RE: Pascual Perea  [address]  [address]  [facility] [address]     Dear Colleague,    Thank you for referring your patient, Pascual Perea, to the [facility] EAR NOSE AND THROAT CLINIC [place] at [facility]. Please see a copy of my visit note below.    Dear Dr. Elizalde:    I had the pleasure of meeting Pascual Perea in consultation today at the [facility] at your request.     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     He was seen in primary care on 6/20/2024 with a progressive right neck mass present for 1 day but increasing in size. He was recommended for a CT neck which was performed on 6/20/2024 and demonstrated a 2.7 x 2.2 x 2.8 cm mass in the left superficial parotid and multiple left level II-IV lymph nodes, largest 3.4 x 3.0 cm with no fat plane with SCM. 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 He has a PET scan scheduled on 7/19/2024.    He says that it is increasing in size. He has some discomfort in it. He does not require any medication for the pain. He has no other masses. He has no swallowing issues. He has a sore throat all the time which he attributes to his CPAP. He says nothing tastes good in the last month and has a decreased appetite as a result. He has lost about 6 lbs about the last 3-5 weeks. He has no voice changes. He has shortness of breath which he attributes to his heart failure. He has no ear pain.     He has no history of skin cancers.     He is accompanied by his daughter.       Past medical history: CKD, diabetes, CAD, hypothyroidism, atrial fibrillation, chronic anticoagulation, hyperlipidemia, heart failure    Past  surgical history: cholecystectomy, bypass surgery    Social history: No smoking history, rare cigar. No chewing tobacco. No alcohol. Lives with son. Retired - .    Family history: Niece had tongue cancer and maybe Hodgkin lymphoma    MEDICATIONS:     Current Outpatient Medications   Medication Sig Dispense Refill    apixaban ANTICOAGULANT (ELIQUIS ANTICOAGULANT) 2.5 MG tablet Take 1 tablet (2.5 mg) by mouth 2 times daily 180 tablet 3    atorvastatin (LIPITOR) 40 MG tablet TAKE 1 TABLET (40 MG) BY MOUTH DAILY 90 tablet 2    azelastine (ASTELIN) 0.1 % nasal spray USE 2 SPRAYS INTO BOTH NOSTRILS 2 TIMES A DAY 90 mL 3    blood glucose (ACCU-CHEK NORMAN PLUS) test strip USE TO TEST BLOOD SUGAR 3 TIMES A DAY OR AS DIRECTED 100 strip 11    blood glucose calibration (ACCU-CHEK NORMAN) solution USE AS DIRECTED 1 each 0    Continuous Blood Gluc  (FREESTYLE DELFINA 2 READER) CHRYSTAL 1 Device continuous 1 each 1    Continuous Blood Gluc Sensor (FREESTYLE DELFINA 2 SENSOR) MIS APPLY 1 SENSOR AND CHANGE EVERY 14 DAYS AS DIRECTED 6 each 3    fenofibrate micronized (LOFIBRA) 67 MG capsule TAKE 1 CAPSULE BY MOUTH EVERY MORNING BEFORE BREAKFAST 90 capsule 3    glucose 4 g CHEW Take 1 tablet by mouth every hour as needed for low blood sugar       Insulin Aspart FlexPen 100 UNIT/ML SOPN Inject 6-10 Units Subcutaneous 3 times daily (with meals) Plus priming      insulin glargine (LANTUS SOLOSTAR) 100 UNIT/ML pen INJECT 31 UNITS UNDER THE SKIN EVERY 12 HOURS 30 mL 4    insulin pen needle (GLOBAL EASE INJECT PEN NEEDLES) 31G X 5 MM miscellaneous USE 1 PENTIP AS DIRECTED 4 TIMES A  each 3    isosorbide mononitrate (IMDUR) 30 MG 24 hr tablet Take 1 tablet (30 mg) by mouth daily 90 tablet 3    levothyroxine (SYNTHROID/LEVOTHROID) 125 MCG tablet Take 1 tablet (125 mcg) by mouth daily 90 tablet 3    nitroGLYcerin (NITROSTAT) 0.4 MG sublingual tablet Place 1 tablet (0.4 mg) under the tongue every 5 minutes as needed for  chest pain 25 tablet 3    torsemide (DEMADEX) 10 MG tablet Take 2 tablets (20 mg) by mouth daily 180 tablet 3       ALLERGIES:    Allergies   Allergen Reactions    Omeprazole      diarrhea    Pantoprazole      diarrhea       HABITS/SOCIAL HISTORY:   No smoking history, rare cigar. No chewing tobacco. No alcohol.   Lives with son.   Retired - .    Social History     Socioeconomic History    Marital status:      Spouse name: Not on file    Number of children: Not on file    Years of education: Not on file    Highest education level: Not on file   Occupational History    Not on file   Tobacco Use    Smoking status: Former     Current packs/day: 0.00     Average packs/day: 1 pack/day for 6.5 years (6.5 ttl pk-yrs)     Types: Cigarettes, Cigars, Pipe     Start date:      Quit date: 1964     Years since quittin.0    Smokeless tobacco: Never   Vaping Use    Vaping status: Never Used   Substance and Sexual Activity    Alcohol use: Not Currently     Comment: couple drinks a year    Drug use: No    Sexual activity: Yes     Partners: Female     Birth control/protection: Abstinence   Other Topics Concern    Parent/sibling w/ CABG, MI or angioplasty before 65F 55M? No     Service Not Asked    Blood Transfusions Not Asked    Caffeine Concern Yes     Comment: 2 cups coffee a day    Occupational Exposure Not Asked    Hobby Hazards Not Asked    Sleep Concern Yes     Comment: sleep apnea, wears cpap off and on    Stress Concern No    Weight Concern No    Special Diet Yes     Comment: diabetic diet     Back Care Not Asked    Exercise No     Comment: occ walk the mall    Bike Helmet Not Asked    Seat Belt Yes    Self-Exams Not Asked   Social History Narrative    Not on file     Social Determinants of Health     Financial Resource Strain: Low Risk  (2024)    Financial Resource Strain     Within the past 12 months, have you or your family members you live with been unable to get utilities  (heat, electricity) when it was really needed?: No   Food Insecurity: Low Risk  (4/19/2024)    Food Insecurity     Within the past 12 months, did you worry that your food would run out before you got money to buy more?: No     Within the past 12 months, did the food you bought just not last and you didn t have money to get more?: No   Transportation Needs: Low Risk  (4/19/2024)    Transportation Needs     Within the past 12 months, has lack of transportation kept you from medical appointments, getting your medicines, non-medical meetings or appointments, work, or from getting things that you need?: No   Physical Activity: Insufficiently Active (4/19/2024)    Exercise Vital Sign     Days of Exercise per Week: 3 days     Minutes of Exercise per Session: 30 min   Stress: No Stress Concern Present (4/19/2024)    [place] [employer] - Occupational Stress Questionnaire     Feeling of Stress : Not at all   Social Connections: Unknown (4/19/2024)    Social Connection and Isolation Panel [NHANES]     Frequency of Communication with Friends and Family: Not on file     Frequency of Social Gatherings with Friends and Family: Once a week     Attends Confucianist Services: Not on file     Active Member of Clubs or Organizations: Not on file     Attends Club or Organization Meetings: Not on file     Marital Status: Not on file   Interpersonal Safety: Low Risk  (4/24/2024)    Interpersonal Safety     Do you feel physically and emotionally safe where you currently live?: Yes     Within the past 12 months, have you been hit, slapped, kicked or otherwise physically hurt by someone?: No     Within the past 12 months, have you been humiliated or emotionally abused in other ways by your partner or ex-partner?: No   Housing Stability: Low Risk  (4/19/2024)    Housing Stability     Do you have housing? : Yes     Are you worried about losing your housing?: No       PAST MEDICAL HISTORY:   Past Medical History:   Diagnosis  Date    Adhesive capsulitis of shoulder     Anemia 03/10/2014    Anemia, unspecified     Antiplatelet or antithrombotic long-term use     Arrhythmia     Atrial fib/flutter    CAD (coronary artery disease)     Gastroparesis     delayed emptying study May 2022    History of cardioversion 04/24/2018    a single synchronized shock of 120 joules restored normal sinus rhythm    History of cardioversion 02/13/2019    single shock , 200 joules successul in restoring NSR    Hyperlipidemia LDL goal <70 05/26/2011    Hypertension     Hypothyroidism     Impotence of organic origin     Laceration of finger 06/2010     left thumb s/p sutures    Numbness and tingling     diabetic neuropathy bilateral feet    BRANDON (obstructive sleep apnea)     intolerant of CPAP, uses it occasionally.  Using mandibular device occasionally    Osteopenia     Pulmonary hypertension (H) 04/06/2012    Sensorineural hearing loss, unspecified     Stented coronary artery 1997    CABG     Testosterone deficiency     Type 2 diabetes mellitus without complication  (goal A1C<8) 10/24/2015    Typical atrial flutter (H) 04/18/2016    Unspecified hypothyroidism     Vitamin D deficiency 09/03/2011        PAST SURGICAL HISTORY:   Past Surgical History:   Procedure Laterality Date    ANESTHESIA CARDIOVERSION N/A 02/13/2019    Procedure: ANESTHESIA CARDIOVERSION;  Surgeon: GENERIC ANESTHESIA PROVIDER;  Location:  OR    ANESTHESIA CARDIOVERSION N/A 05/22/2019    Procedure: ANESTHESIA, FOR CARDIOVERSION;  Surgeon: GENERIC ANESTHESIA PROVIDER;  Location:  OR    CARDIAC SURGERY      bypass in 1997    CARDIOVERSION  04/24/2018    COLONOSCOPY      CORONARY ANGIOGRAPHY ADULT ORDER      4/2/2012    CORONARY ARTERY BYPASS  1997    [facility]    ENDOSCOPIC ULTRASOUND UPPER GASTROINTESTINAL TRACT (GI) N/A 03/14/2019    Procedure: ENDOSCOPIC ULTRASOUND OF UPPER GASTROINTESTINAL TRACT;  Surgeon: Ju Torres MD;  Location:  OR     ESOPHAGOSCOPY, GASTROSCOPY, DUODENOSCOPY (EGD), COMBINED N/A 4/15/2022    Procedure: ESOPHAGOGASTRODUODENOSCOPY (EGD);  Surgeon: Erik Baca DO;  Location:  GI    EXCISE MASS HEAD Left 08/18/2016    Procedure: EXCISE MASS HEAD;  Surgeon: Ivan Hernandez MD;  Location: [facility]    HEART CATH, ANGIOPLASTY  04/2012    LAPAROSCOPIC CHOLECYSTECTOMY N/A 03/17/2019    Procedure: LAPAROSCOPIC CHOLECYSTECTOMY;  Surgeon: Janel Paz MD;  Location:  OR    PHACOEMULSIFICATION CLEAR CORNEA WITH STANDARD INTRAOCULAR LENS IMPLANT Left 06/08/2015    Procedure: PHACOEMULSIFICATION CLEAR CORNEA WITH STANDARD INTRAOCULAR LENS IMPLANT;  Surgeon: Nixon Farnsworth MD;  Location:  EC    PHACOEMULSIFICATION CLEAR CORNEA WITH STANDARD INTRAOCULAR LENS IMPLANT Right 06/22/2015    Procedure: PHACOEMULSIFICATION CLEAR CORNEA WITH STANDARD INTRAOCULAR LENS IMPLANT;  Surgeon: Nixon Farnsworth MD;  Location:  EC    SEPTOPLASTY, TURBINOPLASTY, COMBINED Bilateral 08/18/2016    Procedure: COMBINED SEPTOPLASTY, TURBINOPLASTY;  Surgeon: Ivan Hernandez MD;  Location: [facility]    ZZC NONSPECIFIC PROCEDURE  1997    CABG (Spiritism)    Z NONSPECIFIC PROCEDURE  08/2001    stress echo       FAMILY HISTORY:    Family History   Problem Relation Age of Onset    Genitourinary Problems Father         d: age 89; ARF    Hypertension Father     Diabetes Mother         d: age 68, CAD    Heart Disease Mother         MI    Myocardial Infarction Mother     Cardiovascular Brother         d:  age 31; CAD    Heart Disease Brother         age 31, MI    Diabetes Sister         b:  1939; DM2    Diabetes Sister        REVIEW OF SYSTEMS:  12 point ROS was negative other than the symptoms noted above in the HPI.  Patient Supplied Answers to Review of Systems      7/10/2024     6:13 PM   [facility] E[facility] ROS   Constitutional Weight loss    Appetite change    Unexplained fatigue   Neurology Unexplained weakness   Eyes Visual loss   Ears, Nose, Throat  "Hearing loss   Cardiopulmonary Breathing problems    Chest pain   Musculoskeletal Sore or stiff joints    Back pain    Neck pain   Skin Skin lesions         PHYSICAL EXAMINATION:   /64   Pulse 76   Ht 1.803 m (5' 11\")   Wt 91.2 kg (201 lb)   SpO2 97%   BMI 28.03 kg/m    Appearance:   normal; NAD, age-appropriate appearance, well-developed, normal habitus   Communication:   normal; communicates verbally, normal voice quality   Head/Face:   inspection -  visible left parotid mass   Palpation - no facial numbness   Salivary glands -  large about 3 cm parotid mass on left side, no overlying skin involvement; second mass along inferior right parotid tail   Facial strength -  Normal and symmetric bilateral; H/B I/VI   Skin:  normal, no rash   Ears:  auricle (AD) -  normal, BTE HA  EAC (AD) -  normal  TM (AD) -  Normal, no effusion  auricle (AS) -  normal, BTE HA  EAC (AS) -  normal  TM (AS) -  Normal, no effusion  Decreased clinical speech reception   Nose:  Ext. inspection -  Normal  Internal Inspection -  Normal mucosa, septum, and turbinates   Nasopharynx:  Normal mucosa, no masses   Oral Cavity:  lips -  Normal mucosa, oral competence, and stoma size   Age-appropriate dentition, healthy gingival mucosa   Hard palate, buccal, floor of mouth mucosa normal   Tongue - normal movement, no lesions   Oropharynx:  mucosa -  Normal, no lesions  soft palate -  Normal, no lesions, no asymmetry, normal elevation  tonsils -  Normal, no exudates, no abnormal lesions, symmetric  Base of tongue - normal mucosa, no masses  Vallecula - clear   Hypopharynx:  Normal pyriform sinus and pharyngeal wall mucosa   No pooled secretions    Larynx:  Epiglottis, AE folds, false vocal cords, true vocal cords, arytenoids normal in appearance, bilaterally mobile cords    Neck: Visible left neck level II mass  Left level II mass of about 4-5 cm in size, inseparable from SCM   Lymphatic:  no abnormal nodes   Cardiovascular:  warm, pink, " well-perfused extremities without swelling, tenderness, or edema   Respiratory:  Normal respiratory effort, no stridor   Neuro/Psych.:  mood/affect -  normal  mental status -  normal          PROCEDURES:   Flexible fiberoptic laryngoscopy: Scope exam was indicated due to metastatic carcinoma of unknown primary. Verbal consent was obtained. The nasal cavity was prepped with an aerosolized solution of topical anesthetic and vasoconstrictive agent. The scope was passed through the anterior nasal cavity and advanced. Inspection of the nasopharynx revealed no gross abnormality. The base of tongue and vallecula are normal. The epiglottis, AE folds, false cords, true cords, arytenoids are normal. Inspection of the larynx revealed bilaterally mobile vocal cords. Pyriform sinuses are symmetric. The airway is patent. Procedure tolerated well with no immediate complications noted.    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     He has PET scan scheduled for later this week. Will need to see the results of this to determine next steps/recommendations especially in context of identification of primary site and other metastatic disease which will dictate care.    His path slides have been requested, we discussed the importance of the slides being reviewed with [facility] before will finalize recommendations as review could change diagnosis/treatment.    We will review imaging at multidisciplinary H&N tumor board on Friday.    His CKD will certainly complicate any systemic treatment recommendations along with his baseline hearing loss along with his less common primary tumor diagnosis. We discussed that chemo can impact both of these issues. These complexities will  need to be considered before any systemic treatment.    He may qualify for the [facility] if he has his treatment here at the [facility]. This housing option was discussed. We discussed that he can use this for during treatment but also for appointments.     We discussed that treatment recommendations will be made after PET scan, path review and tumor board discussion. We discussed that these are critical prior to starting any treatment to ensure that appropriate treatment recommendations are provided.    Thank you very much for the opportunity to participate in the care of your patient.      Antonietta Latif MD  Otolaryngology- Head & Neck Surgery      This note was dictated with voice recognition software and then edited. Please excuse any unintentional errors.       CC:  Andrew Elizalde MD  [address] [place] [id number]

## 2024-07-16 ENCOUNTER — LAB (OUTPATIENT)
Dept: LAB | Facility: CLINIC | Age: 84
End: 2024-07-16
Payer: COMMERCIAL

## 2024-07-16 DIAGNOSIS — J31.0 CHRONIC RHINITIS: ICD-10-CM

## 2024-07-16 DIAGNOSIS — N18.32 STAGE 3B CHRONIC KIDNEY DISEASE (H): ICD-10-CM

## 2024-07-16 DIAGNOSIS — Z79.4 TYPE 2 DIABETES MELLITUS WITH STAGE 3B CHRONIC KIDNEY DISEASE, WITH LONG-TERM CURRENT USE OF INSULIN (H): ICD-10-CM

## 2024-07-16 DIAGNOSIS — E11.22 TYPE 2 DIABETES MELLITUS WITH STAGE 3B CHRONIC KIDNEY DISEASE, WITH LONG-TERM CURRENT USE OF INSULIN (H): ICD-10-CM

## 2024-07-16 DIAGNOSIS — D64.9 ANEMIA, UNSPECIFIED TYPE: ICD-10-CM

## 2024-07-16 DIAGNOSIS — N18.32 TYPE 2 DIABETES MELLITUS WITH STAGE 3B CHRONIC KIDNEY DISEASE, WITH LONG-TERM CURRENT USE OF INSULIN (H): ICD-10-CM

## 2024-07-16 LAB
ANION GAP SERPL CALCULATED.3IONS-SCNC: 11 MMOL/L (ref 7–15)
BUN SERPL-MCNC: 24.9 MG/DL (ref 8–23)
CALCIUM SERPL-MCNC: 10.7 MG/DL (ref 8.8–10.4)
CHLORIDE SERPL-SCNC: 102 MMOL/L (ref 98–107)
CREAT SERPL-MCNC: 1.48 MG/DL (ref 0.67–1.17)
EGFRCR SERPLBLD CKD-EPI 2021: 47 ML/MIN/1.73M2
GLUCOSE SERPL-MCNC: 244 MG/DL (ref 70–99)
HBA1C MFR BLD: 7.9 % (ref 0–5.6)
HCO3 SERPL-SCNC: 27 MMOL/L (ref 22–29)
HGB BLD-MCNC: 11 G/DL (ref 13.3–17.7)
PHOSPHATE SERPL-MCNC: 2.9 MG/DL (ref 2.5–4.5)
POTASSIUM SERPL-SCNC: 4.4 MMOL/L (ref 3.4–5.3)
PTH-INTACT SERPL-MCNC: 57 PG/ML (ref 15–65)
SODIUM SERPL-SCNC: 140 MMOL/L (ref 135–145)
VIT D+METAB SERPL-MCNC: 23 NG/ML (ref 20–50)

## 2024-07-16 PROCEDURE — 85018 HEMOGLOBIN: CPT

## 2024-07-16 PROCEDURE — 83970 ASSAY OF PARATHORMONE: CPT

## 2024-07-16 PROCEDURE — 80048 BASIC METABOLIC PNL TOTAL CA: CPT

## 2024-07-16 PROCEDURE — 83036 HEMOGLOBIN GLYCOSYLATED A1C: CPT

## 2024-07-16 PROCEDURE — 84100 ASSAY OF PHOSPHORUS: CPT

## 2024-07-16 PROCEDURE — 36415 COLL VENOUS BLD VENIPUNCTURE: CPT

## 2024-07-16 PROCEDURE — 82306 VITAMIN D 25 HYDROXY: CPT

## 2024-07-16 RX ORDER — AZELASTINE HYDROCHLORIDE 137 UG/1
SPRAY, METERED NASAL
Qty: 30 ML | Refills: 0 | Status: SHIPPED | OUTPATIENT
Start: 2024-07-16 | End: 2024-09-25

## 2024-07-18 ENCOUNTER — LAB REQUISITION (OUTPATIENT)
Dept: LAB | Facility: CLINIC | Age: 84
End: 2024-07-18
Payer: COMMERCIAL

## 2024-07-18 PROCEDURE — 88321 CONSLTJ&REPRT SLD PREP ELSWR: CPT | Mod: GC | Performed by: PATHOLOGY

## 2024-07-19 ENCOUNTER — HOSPITAL ENCOUNTER (OUTPATIENT)
Dept: PET IMAGING | Facility: CLINIC | Age: 84
Discharge: HOME | End: 2024-07-19
Attending: INTERNAL MEDICINE | Admitting: INTERNAL MEDICINE
Payer: COMMERCIAL

## 2024-07-19 ENCOUNTER — TUMOR CONFERENCE (OUTPATIENT)
Dept: ONCOLOGY | Facility: CLINIC | Age: 84
End: 2024-07-19
Payer: COMMERCIAL

## 2024-07-19 DIAGNOSIS — D70.1 CHEMOTHERAPY-INDUCED NEUTROPENIA (H): ICD-10-CM

## 2024-07-19 DIAGNOSIS — C79.9 METASTATIC CARCINOMA (H): ICD-10-CM

## 2024-07-19 DIAGNOSIS — T45.1X5A CHEMOTHERAPY-INDUCED NEUTROPENIA (H): ICD-10-CM

## 2024-07-19 DIAGNOSIS — C07 MALIGNANT NEOPLASM OF PAROTID GLAND (H): ICD-10-CM

## 2024-07-19 DIAGNOSIS — C7A.1 HIGH GRADE NEUROENDOCRINE CARCINOMA (H): Primary | ICD-10-CM

## 2024-07-19 LAB
PATH REPORT.COMMENTS IMP SPEC: ABNORMAL
PATH REPORT.COMMENTS IMP SPEC: YES
PATH REPORT.FINAL DX SPEC: ABNORMAL
PATH REPORT.GROSS SPEC: ABNORMAL
PATH REPORT.MICROSCOPIC SPEC OTHER STN: ABNORMAL
PATH REPORT.RELEVANT HX SPEC: ABNORMAL
PATH REPORT.RELEVANT HX SPEC: ABNORMAL
PATH REPORT.SITE OF ORIGIN SPEC: ABNORMAL

## 2024-07-19 PROCEDURE — 74177 CT ABD & PELVIS W/CONTRAST: CPT | Mod: 26 | Performed by: RADIOLOGY

## 2024-07-19 PROCEDURE — 343N000001 HC RX 343: Performed by: INTERNAL MEDICINE

## 2024-07-19 PROCEDURE — A9552 F18 FDG: HCPCS | Performed by: INTERNAL MEDICINE

## 2024-07-19 PROCEDURE — 71260 CT THORAX DX C+: CPT | Mod: 26 | Performed by: RADIOLOGY

## 2024-07-19 PROCEDURE — 71260 CT THORAX DX C+: CPT

## 2024-07-19 PROCEDURE — 250N000011 HC RX IP 250 OP 636: Performed by: INTERNAL MEDICINE

## 2024-07-19 PROCEDURE — 78815 PET IMAGE W/CT SKULL-THIGH: CPT | Mod: 26 | Performed by: RADIOLOGY

## 2024-07-19 PROCEDURE — 78815 PET IMAGE W/CT SKULL-THIGH: CPT | Mod: PI

## 2024-07-19 RX ORDER — IOPAMIDOL 755 MG/ML
45-150 INJECTION, SOLUTION INTRAVASCULAR ONCE
Status: COMPLETED | OUTPATIENT
Start: 2024-07-19 | End: 2024-07-19

## 2024-07-19 RX ORDER — FLUDEOXYGLUCOSE F 18 200 MCI/ML
10-18 INJECTION, SOLUTION INTRAVENOUS ONCE
Status: COMPLETED | OUTPATIENT
Start: 2024-07-19 | End: 2024-07-19

## 2024-07-19 RX ADMIN — IOPAMIDOL 123 ML: 755 INJECTION, SOLUTION INTRAVENOUS at 07:50

## 2024-07-19 RX ADMIN — FLUDEOXYGLUCOSE F 18 12.02 MILLICURIE: 200 INJECTION, SOLUTION INTRAVENOUS at 07:06

## 2024-07-19 NOTE — PROGRESS NOTES
Case as discussed in tumor board at [facility]. 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    Patient seeing me next week. Chemo -orders placed to secure chair time.

## 2024-07-19 NOTE — TUMOR CONFERENCE
LVM for patient to review recommendations from tumor conference. Provided direct call back number for patient to return call to discuss further.    Halina VILLAREALN, RN

## 2024-07-19 NOTE — TUMOR CONFERENCE
Head & Neck Tumor Conference Note   Status: New  Staff: Dr. Latif    Tumor Site: Left parotid, left neck  Tumor Pathology: High grade neuroendocrine carcinoma, large-cell   Tumor Stage: NA  Tumor Treatment: NA    Reason for Review: Review imaging, path, and POC    Brief History: This is a 84 year old male with a history of progressive right neck mass present for 1 day but increasing in size. He was recommended for a CT neck which was performed on 6/20/2024 and demonstrated a 2.7 x 2.2 x 2.8 cm mass in the left superficial parotid and multiple left level II-IV lymph nodes, largest 3.4 x 3.0 cm with no fat plane with SCM. 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    Pertinent PMH: CKD, diabetes, CAD, hypothyroidism, atrial fibrillation, chronic anticoagulation, hyperlipidemia, heart failure  Past Medical History:   Diagnosis Date    Adhesive capsulitis of shoulder     Anemia 03/10/2014    Anemia, unspecified     Antiplatelet or antithrombotic long-term use     Arrhythmia     Atrial fib/flutter    CAD (coronary artery disease)     Gastroparesis     delayed emptying study May 2022    History of cardioversion 04/24/2018    a single synchronized shock of 120 joules restored normal sinus rhythm    History of cardioversion 02/13/2019    single shock , 200 joules successul in restoring NSR    Hyperlipidemia LDL goal <70 05/26/2011    Hypertension     Hypothyroidism     Impotence of organic origin     Laceration of finger 06/2010     left thumb s/p sutures    Numbness and tingling     diabetic neuropathy bilateral feet    BRANDON (obstructive sleep apnea)     intolerant of CPAP, uses it occasionally.  Using mandibular device occasionally    Osteopenia     Pulmonary hypertension (H) 04/06/2012    Sensorineural hearing  loss, unspecified     Stented coronary artery     CABG     Testosterone deficiency     Type 2 diabetes mellitus without complication  (goal A1C<8) 10/24/2015    Typical atrial flutter (H) 2016    Unspecified hypothyroidism     Vitamin D deficiency 2011      Smoking Hx: No smoking history, rare cigar. No chewing tobacco. No alcohol. Lives with son. Retired - .  Social History     Tobacco Use    Smoking status: Former     Current packs/day: 0.00     Average packs/day: 1 pack/day for 6.5 years (6.5 ttl pk-yrs)     Types: Cigarettes, Cigars, Pipe     Start date:      Quit date: 1964     Years since quittin.0    Smokeless tobacco: Never   Vaping Use    Vaping status: Never Used   Substance Use Topics    Alcohol use: Not Currently     Comment: couple drinks a year    Drug use: No       Imagin24 CT NECK  1. 2.7 x 2.2 x 2.8 cm muscle isodense mass in the superficial left parotid gland. This may represent a primary salivary gland tumor or enlarged intraparotid lymph node. Recommend biopsy.  2. Multiple enlarged left-sided cervical lymph nodes in level II-IV, largest measuring 3.4 x 3.0 cm in the level IIa. The largest lymph node does not have fat plane between the overlying SCM muscle, suspicious for invasion. These can be metastatic if the left parotid mass is a primary malignant salivary gland tumor. Other less likely differential include lymphoproliferative disease. Recommend biopsy.     Pathology:   CASE FROM [place], [place] ([id number], OBTAINED 2024):  LYMPH NODE, LEFT NECK, CORE BIOPSIES:  - METASTATIC HIGH-GRADE NEUROENDOCRINE CARCINOMA, LARGE CELL TYPE, at least 1.2 cm in linear extent, extending to biopsy margins, see comment.    Tumor Board Recommendation:   Discussion: Imaging was reviewed; there are multiple left level 2, 3, 4 lymph nodes with largest node invading into the SCM (3.5cm) and cannot discern margins. The jugular is  significantly narrowed between nodes on left; the node is close to carotid but there appears to be a fat plane between the carotid artery and surrounding nodes.  There is no radiographic evidence of metastatic distant disease. There are no FDG avid lesions in the GI tract, or a mass seen on CT. The esophagus appears unremarkable.   Pathology was discussed; all three samples were simple in appearance and are consistent with a high grade neuroendocrine tumor. There were many abnormal mitotic figure in a single field. In addition, CK20 (amarker for many carcinomas that arise in GI tract) is diffusely and strongly positive. CK20 can also be positive in merkel cell carcinoma which in H&N can present with occult primary but metastatic disease to the neck LN; CK20 in merkel cell shows perinuclear dot-like pattern not membranous diffuse staining like what we see on his current specimens. This does not seem consistent with merkel cell however given postitivity for ck20 and because no clear lesion in GI tract should do a polyomavirus merkel cell workup. p16 is also diffusely posititve but has nothing to do with hpv in this scneario as many high graade neuroendocrine tumors are p16+. Markers for neuroendocrine, synaptophysin diffuse strong positive, as is chromogranin- diffuse positive.   Oncology recommended beginning with systemic therapy from practical standpoint as the rapidity in symptom development and extensive nature may lead to a significantly large surgery. Surgically at this point this remains resectable; however, would need quite a large resection and possible reconstruction with either local flap (ie PEC) vs free flap.   Oncology recommends beginning with platinum eutoposide + concomitant radiation as able. Consider adding a checkpoint inhibitor.   Plan: Referral to medical oncology, radiation oncology    Sarmad Rider MD  Otolaryngology Head and Neck Surgery Resident, PGY-5    Documentation / Disclaimer Cancer  Tumor Board Note: Cancer tumor board recommendations do not override what is determined to be reasonable care and treatment, which is dependent on the circumstances of a patient's case; the patient's medical, social, and personal concerns; and the clinical judgment of the oncologist [physician].

## 2024-07-23 ENCOUNTER — ONCOLOGY VISIT (OUTPATIENT)
Dept: ONCOLOGY | Facility: CLINIC | Age: 84
End: 2024-07-23
Attending: INTERNAL MEDICINE
Payer: COMMERCIAL

## 2024-07-23 ENCOUNTER — PRE VISIT (OUTPATIENT)
Dept: ONCOLOGY | Facility: CLINIC | Age: 84
End: 2024-07-23
Payer: COMMERCIAL

## 2024-07-23 VITALS
DIASTOLIC BLOOD PRESSURE: 68 MMHG | SYSTOLIC BLOOD PRESSURE: 125 MMHG | RESPIRATION RATE: 16 BRPM | OXYGEN SATURATION: 96 % | HEIGHT: 71 IN | WEIGHT: 205.8 LBS | HEART RATE: 86 BPM | BODY MASS INDEX: 28.81 KG/M2

## 2024-07-23 DIAGNOSIS — C7A.1 HIGH GRADE NEUROENDOCRINE CARCINOMA (H): ICD-10-CM

## 2024-07-23 DIAGNOSIS — C7A.1 MALIGNANT POORLY DIFFERENTIATED NEUROENDOCRINE CARCINOMA (H): ICD-10-CM

## 2024-07-23 DIAGNOSIS — T45.1X5A CHEMOTHERAPY-INDUCED NEUTROPENIA (H): Primary | ICD-10-CM

## 2024-07-23 DIAGNOSIS — D70.1 CHEMOTHERAPY-INDUCED NEUTROPENIA (H): Primary | ICD-10-CM

## 2024-07-23 DIAGNOSIS — K11.8 MASS OF LEFT PAROTID GLAND: ICD-10-CM

## 2024-07-23 PROCEDURE — G0463 HOSPITAL OUTPT CLINIC VISIT: HCPCS | Performed by: INTERNAL MEDICINE

## 2024-07-23 PROCEDURE — 99205 OFFICE O/P NEW HI 60 MIN: CPT | Performed by: INTERNAL MEDICINE

## 2024-07-23 RX ORDER — EPINEPHRINE 1 MG/ML
0.3 INJECTION, SOLUTION INTRAMUSCULAR; SUBCUTANEOUS EVERY 5 MIN PRN
Status: CANCELLED | OUTPATIENT
Start: 2024-08-14

## 2024-07-23 RX ORDER — ALBUTEROL SULFATE 90 UG/1
1-2 AEROSOL, METERED RESPIRATORY (INHALATION)
Status: CANCELLED
Start: 2024-08-15

## 2024-07-23 RX ORDER — DIPHENHYDRAMINE HYDROCHLORIDE 50 MG/ML
50 INJECTION INTRAMUSCULAR; INTRAVENOUS
Status: CANCELLED
Start: 2024-08-14

## 2024-07-23 RX ORDER — MEPERIDINE HYDROCHLORIDE 25 MG/ML
25 INJECTION INTRAMUSCULAR; INTRAVENOUS; SUBCUTANEOUS EVERY 30 MIN PRN
Status: CANCELLED | OUTPATIENT
Start: 2024-08-15

## 2024-07-23 RX ORDER — METHYLPREDNISOLONE SODIUM SUCCINATE 125 MG/2ML
125 INJECTION, POWDER, LYOPHILIZED, FOR SOLUTION INTRAMUSCULAR; INTRAVENOUS
Status: CANCELLED
Start: 2024-08-15

## 2024-07-23 RX ORDER — ALBUTEROL SULFATE 90 UG/1
1-2 AEROSOL, METERED RESPIRATORY (INHALATION)
Status: CANCELLED
Start: 2024-08-14

## 2024-07-23 RX ORDER — MEPERIDINE HYDROCHLORIDE 25 MG/ML
25 INJECTION INTRAMUSCULAR; INTRAVENOUS; SUBCUTANEOUS EVERY 30 MIN PRN
Status: CANCELLED | OUTPATIENT
Start: 2024-08-16

## 2024-07-23 RX ORDER — MEPERIDINE HYDROCHLORIDE 25 MG/ML
25 INJECTION INTRAMUSCULAR; INTRAVENOUS; SUBCUTANEOUS EVERY 30 MIN PRN
Status: CANCELLED | OUTPATIENT
Start: 2024-08-14

## 2024-07-23 RX ORDER — HEPARIN SODIUM (PORCINE) LOCK FLUSH IV SOLN 100 UNIT/ML 100 UNIT/ML
5 SOLUTION INTRAVENOUS
Status: CANCELLED | OUTPATIENT
Start: 2024-08-16

## 2024-07-23 RX ORDER — LORAZEPAM 2 MG/ML
0.5 INJECTION INTRAMUSCULAR EVERY 4 HOURS PRN
Status: CANCELLED | OUTPATIENT
Start: 2024-08-16

## 2024-07-23 RX ORDER — ALBUTEROL SULFATE 0.83 MG/ML
2.5 SOLUTION RESPIRATORY (INHALATION)
Status: CANCELLED | OUTPATIENT
Start: 2024-08-14

## 2024-07-23 RX ORDER — EPINEPHRINE 1 MG/ML
0.3 INJECTION, SOLUTION INTRAMUSCULAR; SUBCUTANEOUS EVERY 5 MIN PRN
Status: CANCELLED | OUTPATIENT
Start: 2024-08-15

## 2024-07-23 RX ORDER — HEPARIN SODIUM,PORCINE 10 UNIT/ML
5-20 VIAL (ML) INTRAVENOUS DAILY PRN
Status: CANCELLED | OUTPATIENT
Start: 2024-08-16

## 2024-07-23 RX ORDER — LORAZEPAM 2 MG/ML
0.5 INJECTION INTRAMUSCULAR EVERY 4 HOURS PRN
Status: CANCELLED | OUTPATIENT
Start: 2024-08-15

## 2024-07-23 RX ORDER — HEPARIN SODIUM (PORCINE) LOCK FLUSH IV SOLN 100 UNIT/ML 100 UNIT/ML
5 SOLUTION INTRAVENOUS
Status: CANCELLED | OUTPATIENT
Start: 2024-08-15

## 2024-07-23 RX ORDER — EPINEPHRINE 1 MG/ML
0.3 INJECTION, SOLUTION INTRAMUSCULAR; SUBCUTANEOUS EVERY 5 MIN PRN
Status: CANCELLED | OUTPATIENT
Start: 2024-08-16

## 2024-07-23 RX ORDER — LORAZEPAM 2 MG/ML
0.5 INJECTION INTRAMUSCULAR EVERY 4 HOURS PRN
Status: CANCELLED | OUTPATIENT
Start: 2024-08-14

## 2024-07-23 RX ORDER — ALBUTEROL SULFATE 90 UG/1
1-2 AEROSOL, METERED RESPIRATORY (INHALATION)
Status: CANCELLED
Start: 2024-08-16

## 2024-07-23 RX ORDER — METHYLPREDNISOLONE SODIUM SUCCINATE 125 MG/2ML
125 INJECTION, POWDER, LYOPHILIZED, FOR SOLUTION INTRAMUSCULAR; INTRAVENOUS
Status: CANCELLED
Start: 2024-08-14

## 2024-07-23 RX ORDER — PALONOSETRON 0.05 MG/ML
0.25 INJECTION, SOLUTION INTRAVENOUS ONCE
Status: CANCELLED | OUTPATIENT
Start: 2024-08-14

## 2024-07-23 RX ORDER — DIPHENHYDRAMINE HYDROCHLORIDE 50 MG/ML
50 INJECTION INTRAMUSCULAR; INTRAVENOUS
Status: CANCELLED
Start: 2024-08-16

## 2024-07-23 RX ORDER — METHYLPREDNISOLONE SODIUM SUCCINATE 125 MG/2ML
125 INJECTION, POWDER, LYOPHILIZED, FOR SOLUTION INTRAMUSCULAR; INTRAVENOUS
Status: CANCELLED
Start: 2024-08-16

## 2024-07-23 RX ORDER — ALBUTEROL SULFATE 0.83 MG/ML
2.5 SOLUTION RESPIRATORY (INHALATION)
Status: CANCELLED | OUTPATIENT
Start: 2024-08-16

## 2024-07-23 RX ORDER — HEPARIN SODIUM,PORCINE 10 UNIT/ML
5-20 VIAL (ML) INTRAVENOUS DAILY PRN
Status: CANCELLED | OUTPATIENT
Start: 2024-08-15

## 2024-07-23 RX ORDER — ALBUTEROL SULFATE 0.83 MG/ML
2.5 SOLUTION RESPIRATORY (INHALATION)
Status: CANCELLED | OUTPATIENT
Start: 2024-08-15

## 2024-07-23 RX ORDER — DIPHENHYDRAMINE HYDROCHLORIDE 50 MG/ML
50 INJECTION INTRAMUSCULAR; INTRAVENOUS
Status: CANCELLED
Start: 2024-08-15

## 2024-07-23 RX ORDER — HEPARIN SODIUM (PORCINE) LOCK FLUSH IV SOLN 100 UNIT/ML 100 UNIT/ML
5 SOLUTION INTRAVENOUS
Status: CANCELLED | OUTPATIENT
Start: 2024-08-14

## 2024-07-23 RX ORDER — HEPARIN SODIUM,PORCINE 10 UNIT/ML
5-20 VIAL (ML) INTRAVENOUS DAILY PRN
Status: CANCELLED | OUTPATIENT
Start: 2024-08-14

## 2024-07-23 ASSESSMENT — PAIN SCALES - GENERAL: PAINLEVEL: NO PAIN (0)

## 2024-07-23 NOTE — LETTER
"    7/23/2024         RE: Pascual Perea  [address]  [facility] [address]      Oncology Rooming Note    July 23, 2024 3:00 PM   Pascual Perea is a 84 year old male who presents for:    Chief Complaint   Patient presents with     Oncology Clinic Visit     Initial Vitals: There were no vitals taken for this visit. Estimated body mass index is 28.03 kg/m  as calculated from the following:    Height as of 7/15/24: 1.803 m (5' 11\").    Weight as of 7/15/24: 91.2 kg (201 lb). There is no height or weight on file to calculate BSA.  Data Unavailable Comment: Data Unavailable   No LMP for male patient.  Allergies reviewed: Yes  Medications reviewed: Yes    Medications: Medication refills not needed today.  Pharmacy name entered into [employer]: [facility] - [address]    Frailty Screening:   Is the patient here for a new oncology consult visit in cancer care? 2. No        Tigist Jones MA              This consult has been requested by Dr. Andrew Elizalde for poorly differentiated carcinoma.    Patient is accompanied by his daughter.    Mr. Perea is a 84-year-old gentleman who felt a lump in left side of his face/neck about a month ago.  Patient subsequently had multiple investigation and found to have malignancy.  Investigations are summarized below.  1.  CT neck on 06/20/2024 revealed 2.8 cm mass in left parotid gland.  Also revealed multiple enlarged left-sided cervical lymph node.  2.  Ultrasound-guided biopsy of left cervical mass on 06/28/2024 revealed METASTATIC HIGH-GRADE NEUROENDOCRINE CARCINOMA, LARGE CELL TYPE, at least 1.2 cm in linear extent, extending to biopsy margins.  3.  PET scan on 07/19/2024 revealed hypermetabolic left parotid mass and multiple hypermetabolic left cervical lymphadenopathy.  Severely stenotic left internal jugular vein secondary to mass effect.    He was seen by ENT at [facility].  Flexible fiberoptic laryngoscopy was normal.    His " case was discussed at tumor board at [facility].  Tumor board recommendation was to treat him as limited stage small cell lung cancer.  Recommendation was to give him 4 cycle of platinum and etoposide and add radiation with cycle 2 or 3.    Patient says that lump has slightly increased in size.  Sometimes there is mild pain there.  No severe pain.    Review of system:  No headache.  No dizziness.  No problem swallowing food.  No chest pain.  No shortness of breath.  No fever or night sweats.  Appetite has been fairly good.  No urinary complaints.  No bowel problem.  No bleeding.  He has mild numbness and tingling in the fingers and toes from diabetes.    Allergies: Reviewed.    Medications: Reviewed.    Past medical history:  -Atrial flutter/atrial fibrillation  -Cardioversion  -Diabetes mellitus  -Coronary artery disease status post CABG  -Anemia  -Hyperlipidemia  -Hypertension  -Hypothyroidism  -Peripheral neuropathy  -Sleep apnea  -Osteopenia  -Sensorineural hearing loss  -Cholecystectomy  -Cataract surgery  -Septoplasty.    Social history:  -He quit smoking many years ago.    Exam:  He is alert and oriented x 3.  Not in any distress.  Vitals: Reviewed.  Vascular system is not examined.    Labs: CBC and BMP on 07/16/2024 reviewed.    Assessment:  1.  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  2.  Chronic kidney disease.  3.  Chronic hypercalcemia.  4.  Sensorineural hearing loss.  5.  Peripheral neuropathy.  6.  Multiple other medical problems including hypertension, hyperlipidemia, hypothyroidism and coronary artery disease.    Recommendation:  -Start chemotherapy as scheduled.  -Port-placement  -See me or KOJO when he comes to start chemotherapy.  -Radiation oncology appointment.    Discussion:  1.  I had a long discussion the patient and his daughter.  CT scan, PET scan and biopsy report was  reviewed.  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  No evidence of distant metastasis.    2.  Discussed regarding treatment.  His case has been discussed in tumor board at [facility].  Their recommendation is for chemotherapy with platinum and etoposide and also radiation.    Discussed regarding chemotherapy.  Discussed regarding carboplatin/cisplatin with etoposide.  He is not a candidate for cisplatin.  His creatinine is elevated.  He has significant decreased hearing.  Without hearing aid, he cannot hear anything.  He also has some neuropathy.  Based on all this, I would not recommend cisplatin.    Will give him 4 cycle of carboplatin and etoposide.  Regimen reviewed.  Side effects reviewed.  He is agreeable for it.  Will give him growth factor support with Neulasta.    Discussed regarding radiation.  This will be added with cycle 2 of cycle 3.  Will schedule him to see radiation oncologist.    3.  Discussed regarding Port-A-Cath placement.  He is agreeable for it.  That will be scheduled.    4.  Patient has chronic hypercalcemia.  On 10/19/2022, calcium mildly elevated at 10.5.  Since then his calcium level has been chronically elevated.  No need for bisphosphonate at this time as it is chronically elevated and he is asymptomatic from it.  Will continue to monitor it.    5.  Patient and his daughter had a few questions which were all answered.  He will be seen when he comes to start treatment.    Thanks for the consult.    Total visit time of 60 minutes.  Time spent in today's visit, review of chart/investigations today, discussing regarding high risk drugs and documentation today.        Florencia Butler MD

## 2024-07-23 NOTE — PROGRESS NOTES
"Oncology Rooming Note    July 23, 2024 3:00 PM   Pascual Perea is a 84 year old male who presents for:    Chief Complaint   Patient presents with    Oncology Clinic Visit     Initial Vitals: There were no vitals taken for this visit. Estimated body mass index is 28.03 kg/m  as calculated from the following:    Height as of 7/15/24: 1.803 m (5' 11\").    Weight as of 7/15/24: 91.2 kg (201 lb). There is no height or weight on file to calculate BSA.  Data Unavailable Comment: Data Unavailable   No LMP for male patient.  Allergies reviewed: Yes  Medications reviewed: Yes    Medications: Medication refills not needed today.  Pharmacy name entered into [employer]: [facility] - [address]    Frailty Screening:   Is the patient here for a new oncology consult visit in cancer care? 2. No        Tigist Jones MA            "

## 2024-07-23 NOTE — LETTER
"7/23/2024      Pascual Perea  [address]  [facility] [address]      Dear Colleague,    Thank you for referring your patient, Pascual Perea, to the [facility]. Please see a copy of my visit note below.    Oncology Rooming Note    July 23, 2024 3:00 PM   Pascual Perea is a 84 year old male who presents for:    Chief Complaint   Patient presents with     Oncology Clinic Visit     Initial Vitals: There were no vitals taken for this visit. Estimated body mass index is 28.03 kg/m  as calculated from the following:    Height as of 7/15/24: 1.803 m (5' 11\").    Weight as of 7/15/24: 91.2 kg (201 lb). There is no height or weight on file to calculate BSA.  Data Unavailable Comment: Data Unavailable   No LMP for male patient.  Allergies reviewed: Yes  Medications reviewed: Yes    Medications: Medication refills not needed today.  Pharmacy name entered into [employer]: [facility] - [address]    Frailty Screening:   Is the patient here for a new oncology consult visit in cancer care? 2. No        Tigist Jones MA              This consult has been requested by Dr. Andrew Elizalde for poorly differentiated carcinoma.    Patient is accompanied by his daughter.    Mr. Perea is a 84-year-old gentleman who felt a lump in left side of his face/neck about a month ago.  Patient subsequently had multiple investigation and found to have malignancy.  Investigations are summarized below.  1.  CT neck on 06/20/2024 revealed 2.8 cm mass in left parotid gland.  Also revealed multiple enlarged left-sided cervical lymph node.  2.  Ultrasound-guided biopsy of left cervical mass on 06/28/2024 revealed METASTATIC HIGH-GRADE NEUROENDOCRINE CARCINOMA, LARGE CELL TYPE, at least 1.2 cm in linear extent, extending to biopsy margins.  3.  PET scan on 07/19/2024 revealed hypermetabolic left parotid mass and multiple hypermetabolic left cervical lymphadenopathy.  Severely stenotic " left internal jugular vein secondary to mass effect.    He was seen by ENT at [facility].  Flexible fiberoptic laryngoscopy was normal.    His case was discussed at tumor board at [facility].  Tumor board recommendation was to treat him as limited stage small cell lung cancer.  Recommendation was to give him 4 cycle of platinum and etoposide and add radiation with cycle 2 or 3.    Patient says that lump has slightly increased in size.  Sometimes there is mild pain there.  No severe pain.    Review of system:  No headache.  No dizziness.  No problem swallowing food.  No chest pain.  No shortness of breath.  No fever or night sweats.  Appetite has been fairly good.  No urinary complaints.  No bowel problem.  No bleeding.  He has mild numbness and tingling in the fingers and toes from diabetes.    Allergies: Reviewed.    Medications: Reviewed.    Past medical history:  -Atrial flutter/atrial fibrillation  -Cardioversion  -Diabetes mellitus  -Coronary artery disease status post CABG  -Anemia  -Hyperlipidemia  -Hypertension  -Hypothyroidism  -Peripheral neuropathy  -Sleep apnea  -Osteopenia  -Sensorineural hearing loss  -Cholecystectomy  -Cataract surgery  -Septoplasty.    Social history:  -He quit smoking many years ago.    Exam:  He is alert and oriented x 3.  Not in any distress.  Vitals: Reviewed.  Vascular system is not examined.    Labs: CBC and BMP on 07/16/2024 reviewed.    Assessment:  1.  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  2.  Chronic kidney disease.  3.  Chronic hypercalcemia.  4.  Sensorineural hearing loss.  5.  Peripheral neuropathy.  6.  Multiple other medical problems including hypertension, hyperlipidemia, hypothyroidism and coronary artery disease.    Recommendation:  -Start chemotherapy as scheduled.  -Port-placement  -See me or KOJO when he comes to start  chemotherapy.  -Radiation oncology appointment.    Discussion:  1.  I had a long discussion the patient and his daughter.  CT scan, PET scan and biopsy report was reviewed.  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  No evidence of distant metastasis.    2.  Discussed regarding treatment.  His case has been discussed in tumor board at [facility].  Their recommendation is for chemotherapy with platinum and etoposide and also radiation.    Discussed regarding chemotherapy.  Discussed regarding carboplatin/cisplatin with etoposide.  He is not a candidate for cisplatin.  His creatinine is elevated.  He has significant decreased hearing.  Without hearing aid, he cannot hear anything.  He also has some neuropathy.  Based on all this, I would not recommend cisplatin.    Will give him 4 cycle of carboplatin and etoposide.  Regimen reviewed.  Side effects reviewed.  He is agreeable for it.  Will give him growth factor support with Neulasta.    Discussed regarding radiation.  This will be added with cycle 2 of cycle 3.  Will schedule him to see radiation oncologist.    3.  Discussed regarding Port-A-Cath placement.  He is agreeable for it.  That will be scheduled.    4.  Patient has chronic hypercalcemia.  On 10/19/2022, calcium mildly elevated at 10.5.  Since then his calcium level has been chronically elevated.  No need for bisphosphonate at this time as it is chronically elevated and he is asymptomatic from it.  Will continue to monitor it.    5.  Patient and his daughter had a few questions which were all answered.  He will be seen when he comes to start treatment.    Thanks for the consult.    Total visit time of 60 minutes.  Time spent in today's visit, review of chart/investigations today, discussing regarding high risk drugs and documentation today.      Again, thank you for allowing me to participate in the care of your  patient.        Sincerely,        Florencia Butler MD

## 2024-07-23 NOTE — PATIENT INSTRUCTIONS
-Start chemotherapy as scheduled.  -Port-placement  -See me or KOJO when he comes to start chemotherapy.  -Radiation oncology appointment.

## 2024-07-23 NOTE — PROGRESS NOTES
This consult has been requested by Dr. Andrew Elizalde for poorly differentiated carcinoma.    Patient is accompanied by his daughter.    Mr. Perea is a 84-year-old gentleman who felt a lump in left side of his face/neck about a month ago.  Patient subsequently had multiple investigation and found to have malignancy.  Investigations are summarized below.  1.  CT neck on 06/20/2024 revealed 2.8 cm mass in left parotid gland.  Also revealed multiple enlarged left-sided cervical lymph node.  2.  Ultrasound-guided biopsy of left cervical mass on 06/28/2024 revealed METASTATIC HIGH-GRADE NEUROENDOCRINE CARCINOMA, LARGE CELL TYPE, at least 1.2 cm in linear extent, extending to biopsy margins.  3.  PET scan on 07/19/2024 revealed hypermetabolic left parotid mass and multiple hypermetabolic left cervical lymphadenopathy.  Severely stenotic left internal jugular vein secondary to mass effect.    He was seen by ENT at [facility].  Flexible fiberoptic laryngoscopy was normal.    His case was discussed at tumor board at [facility].  Tumor board recommendation was to treat him as limited stage small cell lung cancer.  Recommendation was to give him 4 cycle of platinum and etoposide and add radiation with cycle 2 or 3.    Patient says that lump has slightly increased in size.  Sometimes there is mild pain there.  No severe pain.    Review of system:  No headache.  No dizziness.  No problem swallowing food.  No chest pain.  No shortness of breath.  No fever or night sweats.  Appetite has been fairly good.  No urinary complaints.  No bowel problem.  No bleeding.  He has mild numbness and tingling in the fingers and toes from diabetes.    Allergies: Reviewed.    Medications: Reviewed.    Past medical history:  -Atrial flutter/atrial fibrillation  -Cardioversion  -Diabetes mellitus  -Coronary artery disease status post CABG  -Anemia  -Hyperlipidemia  -Hypertension  -Hypothyroidism  -Peripheral  neuropathy  -Sleep apnea  -Osteopenia  -Sensorineural hearing loss  -Cholecystectomy  -Cataract surgery  -Septoplasty.    Social history:  -He quit smoking many years ago.    Exam:  He is alert and oriented x 3.  Not in any distress.  Vitals: Reviewed.  Vascular system is not examined.    Labs: CBC and BMP on 07/16/2024 reviewed.    Assessment:  1.  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  2.  Chronic kidney disease.  3.  Chronic hypercalcemia.  4.  Sensorineural hearing loss.  5.  Peripheral neuropathy.  6.  Multiple other medical problems including hypertension, hyperlipidemia, hypothyroidism and coronary artery disease.    Recommendation:  -Start chemotherapy as scheduled.  -Port-placement  -See me or KOJO when he comes to start chemotherapy.  -Radiation oncology appointment.    Discussion:  1.  I had a long discussion the patient and his daughter.  CT scan, PET scan and biopsy report was reviewed.  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  No evidence of distant metastasis.    2.  Discussed regarding treatment.  His case has been discussed in tumor board at [facility].  Their recommendation is for chemotherapy with platinum and etoposide and also radiation.    Discussed regarding chemotherapy.  Discussed regarding carboplatin/cisplatin with etoposide.  He is not a candidate for cisplatin.  His creatinine is elevated.  He has significant decreased hearing.  Without hearing aid, he cannot hear anything.  He also has some neuropathy.  Based on all this, I would not recommend cisplatin.    Will give him 4 cycle of carboplatin and etoposide.  Regimen reviewed.  Side effects reviewed.  He is agreeable for it.  Will give him growth factor support with Neulasta.    Discussed regarding radiation.  This will be added with cycle 2  of cycle 3.  Will schedule him to see radiation oncologist.    3.  Discussed regarding Port-A-Cath placement.  He is agreeable for it.  That will be scheduled.    4.  Patient has chronic hypercalcemia.  On 10/19/2022, calcium mildly elevated at 10.5.  Since then his calcium level has been chronically elevated.  No need for bisphosphonate at this time as it is chronically elevated and he is asymptomatic from it.  Will continue to monitor it.    5.  Patient and his daughter had a few questions which were all answered.  He will be seen when he comes to start treatment.    Thanks for the consult.    Total visit time of 60 minutes.  Time spent in today's visit, review of chart/investigations today, discussing regarding high risk drugs and documentation today.

## 2024-08-06 ENCOUNTER — OFFICE VISIT (OUTPATIENT)
Dept: CARDIOLOGY | Facility: CLINIC | Age: 84
End: 2024-08-06
Payer: COMMERCIAL

## 2024-08-06 VITALS
DIASTOLIC BLOOD PRESSURE: 60 MMHG | HEIGHT: 71 IN | OXYGEN SATURATION: 94 % | WEIGHT: 207.9 LBS | SYSTOLIC BLOOD PRESSURE: 128 MMHG | HEART RATE: 70 BPM | BODY MASS INDEX: 29.1 KG/M2

## 2024-08-06 DIAGNOSIS — I48.91 ATRIAL FIBRILLATION, UNSPECIFIED TYPE (H): ICD-10-CM

## 2024-08-06 PROCEDURE — 99214 OFFICE O/P EST MOD 30 MIN: CPT | Performed by: INTERNAL MEDICINE

## 2024-08-06 NOTE — LETTER
8/6/2024    Andrew Elizalde MD  [address] [facility] [address]    RE: Pascual Esteswrocki       Dear Colleague,     I had the pleasure of seeing Pascual Perea in the [facility].  HPI and Plan:   Mr Perea is a very pleasant 84-year-old gentleman with the history of CAD with history of CABG in 1997, atrial fibrillation flutter with history of cardioversion and patient was on amiodarone in the past but this was discontinued because of abnormal liver enzymes, on apixaban for stroke prophylaxis ,hospitalization for decompensated congestive heart failure in December 2021 in the setting of diuretics being discontinued a few months prior to admission due to hypercalcemia and patient was found to have congestive heart failure with moderately decreased RV systolic function with severe pulmonary hypertension, underwent diuresis and lost around 13 pounds of weight.  Patient also has history of sleep apnea diagnosed about 12 years ago and and started using CPAP since August 2022.  He also had a repeat echocardiogram done around the same time (August 2022) that showed LVEF of 60% with moderate LVH moderately dilated RV with moderately decreased RV systolic function with moderate pulm hypertension with RVSP of 48 mmHg plus RA with mild to moderate aortic stenosis.  To be noted echocardiogram done in December 2021 showed RVSP of 70 mg hg plus RA.  He also had a Holter evaluation done that showed rhythm to be persistent atrial fibrillation with average ventricular rate of 80 bpm low of 61 high of 113 without any need for AV taylor blocking agent.   Patient underwent cardiac MRI October 2022 that did not reveal any inducible ischemia.  Normal LV systolic function, mildly dilated RV with normal RV systolic function with mild flattening of interventricular septum possibly from RV volume and pressure overload.  Some non CAD scarring in the septum with RV and attachment regions which could be seen with pulmonary  hypertension.  No evidence of infiltrative cardiomyopathy.  Patient was also seen in the ER around the same time (October 2022) because of weight gain of about 8 to 10 pounds with some shortness of breath and shortness of breath with exertion.  He was diagnosed with some decompressed congestive heart failure in the ER and his torsemide was increased and did well after that and is now on 20 mg daily of torsemide.  He has done well from cardiac standpoint of view since that time.  He had a repeat echocardiogram in 2023 that showed normal LV and RV systolic function, normal RV size, mild aortic valve stenosis, underlying rhythm to be atrial fibrillation.  Today is coming for routine follow-up.  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  He denies any exertional symptoms of chest discomfort shortness breath dizziness presyncope or syncope.  No recent falls, no bleeding issues.        Assessment plan  1.  History of preserved ejection fraction congestive heart failure with history of right-sided congestive heart failure.  Now appears euvolemic compensated on torsemide.  Latest echocardiogram showing normal LV function normal RV size and systolic function.  Mild LVH on echocardiogram.   No evidence of infiltrative cardiomyopathy on cardiac MRI.   2.  History of severe pulmonary hypertension.    Was initially noted in the setting of volume overload and untreated sleep apnea.  There was significant RV enlargement and dysfunction both of this has improved to normal on last echocardiogram.   3.  Chronic atrial fibrillation flutter with, ventricular rates well controlled without any need for AV taylor blocking agents. Holter showed well-controlled ventricular rates with rhythm to be atrial fibrillation.  No dizziness presyncope syncope Was on amiodarone in the past but this was discontinued because of  abnormal liver enzymes.  Chads 2 Vascor of at least 6.  On apixaban for stroke prophylaxis.     4. CAD with history of CABG in 1997.  Stress test in June 2021 showing small apical infarct.  Clinically no anginal symptoms.  On high intensity statin LDL well controlled.  Cardiac MRI stress perfusion in October 2022 with no evidence of inducible ischemia.  On apixaban, high intensity statin, LDL well controlled, also on fenofibrate for hypertriglyceridemia, triglycerides 324.  5.  Obstructive sleep apnea.    On CPAP with significant improvement in energy level while using CPAP and feeling more refreshed after sleep now.  6.  Mild aortic valve stenosis  7.  Chronic kidney disease stage III.   Creatinine mostly above 1.5  8.  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  Being followed by oncology plan is chemotherapy and radiation therapy in near future.    Recommendations  Overall uJice satisfies he is doing well.  Continue apixaban and statin  Follow-up in a year with an echocardiogram, sooner if he notes any change in clinical status.          Orders Placed This Encounter   Procedures     Follow-Up with Cardiology     Echocardiogram Complete       No orders of the defined types were placed in this encounter.      There are no discontinued medications.      Encounter Diagnosis   Name Primary?     Atrial fibrillation, unspecified type (H)        CURRENT MEDICATIONS:  Current Outpatient Medications   Medication Sig Dispense Refill     apixaban ANTICOAGULANT (ELIQUIS ANTICOAGULANT) 2.5 MG tablet Take 1 tablet (2.5 mg) by mouth 2 times daily 180 tablet 3     atorvastatin (LIPITOR) 40 MG tablet TAKE 1 TABLET (40 MG) BY MOUTH DAILY 90 tablet 2     azelastine 137 MCG/SPRAY SOLN USE 2 SPRAYS INTO BOTH NOSTRILS 2 TIMES A DAY 30 mL 0     blood glucose (ACCU-CHEK NORMAN PLUS) test strip USE TO TEST BLOOD SUGAR 3 TIMES A DAY OR AS DIRECTED 100 strip 11     blood glucose calibration  (ACCU-CHEK NORMAN) solution USE AS DIRECTED 1 each 0     Continuous Blood Gluc  (FREESTYLE DELFINA 2 READER) CHRYSTAL 1 Device continuous 1 each 1     Continuous Blood Gluc Sensor (FREESTYLE DELFINA 2 SENSOR) [facility] APPLY 1 SENSOR AND CHANGE EVERY 14 DAYS AS DIRECTED 6 each 3     fenofibrate micronized (LOFIBRA) 67 MG capsule TAKE 1 CAPSULE BY MOUTH EVERY MORNING BEFORE BREAKFAST 90 capsule 3     glucose 4 g CHEW Take 1 tablet by mouth every hour as needed for low blood sugar        Insulin Aspart FlexPen 100 UNIT/ML SOPN Inject 6-10 Units Subcutaneous 3 times daily (with meals) Plus priming       insulin glargine (LANTUS SOLOSTAR) 100 UNIT/ML pen INJECT 31 UNITS UNDER THE SKIN EVERY 12 HOURS 30 mL 4     insulin pen needle (GLOBAL EASE INJECT PEN NEEDLES) 31G X 5 MM miscellaneous USE 1 PENTIP AS DIRECTED 4 TIMES A  each 3     isosorbide mononitrate (IMDUR) 30 MG 24 hr tablet Take 1 tablet (30 mg) by mouth daily 90 tablet 3     levothyroxine (SYNTHROID/LEVOTHROID) 125 MCG tablet Take 1 tablet (125 mcg) by mouth daily 90 tablet 3     nitroGLYcerin (NITROSTAT) 0.4 MG sublingual tablet Place 1 tablet (0.4 mg) under the tongue every 5 minutes as needed for chest pain 25 tablet 3     torsemide (DEMADEX) 10 MG tablet Take 2 tablets (20 mg) by mouth daily 180 tablet 3       ALLERGIES     Allergies   Allergen Reactions     Omeprazole      diarrhea     Pantoprazole      diarrhea       PAST MEDICAL HISTORY:  Past Medical History:   Diagnosis Date     Adhesive capsulitis of shoulder      Anemia 03/10/2014     Anemia, unspecified      Antiplatelet or antithrombotic long-term use      Arrhythmia     Atrial fib/flutter     CAD (coronary artery disease)      Gastroparesis     delayed emptying study May 2022     History of cardioversion 04/24/2018    a single synchronized shock of 120 joules restored normal sinus rhythm     History of cardioversion 02/13/2019    single shock , 200 joules successul in restoring NSR      Hyperlipidemia LDL goal <70 05/26/2011     Hypertension      Hypothyroidism      Impotence of organic origin      Laceration of finger 06/2010     left thumb s/p sutures     Numbness and tingling     diabetic neuropathy bilateral feet     BRANDON (obstructive sleep apnea)     intolerant of CPAP, uses it occasionally.  Using mandibular device occasionally     Osteopenia      Pulmonary hypertension (H) 04/06/2012     Sensorineural hearing loss, unspecified      Stented coronary artery 1997    CABG      Testosterone deficiency      Type 2 diabetes mellitus without complication  (goal A1C<8) 10/24/2015     Typical atrial flutter (H) 04/18/2016     Unspecified hypothyroidism      Vitamin D deficiency 09/03/2011       PAST SURGICAL HISTORY:  Past Surgical History:   Procedure Laterality Date     ANESTHESIA CARDIOVERSION N/A 02/13/2019    Procedure: ANESTHESIA CARDIOVERSION;  Surgeon: GENERIC ANESTHESIA PROVIDER;  Location:  OR     ANESTHESIA CARDIOVERSION N/A 05/22/2019    Procedure: ANESTHESIA, FOR CARDIOVERSION;  Surgeon: GENERIC ANESTHESIA PROVIDER;  Location:  OR     CARDIAC SURGERY      bypass in 1997     CARDIOVERSION  04/24/2018     COLONOSCOPY       CORONARY ANGIOGRAPHY ADULT ORDER      4/2/2012     CORONARY ARTERY BYPASS  1997    [facility]     ENDOSCOPIC ULTRASOUND UPPER GASTROINTESTINAL TRACT (GI) N/A 03/14/2019    Procedure: ENDOSCOPIC ULTRASOUND OF UPPER GASTROINTESTINAL TRACT;  Surgeon: Ju Torres MD;  Location:  OR     ESOPHAGOSCOPY, GASTROSCOPY, DUODENOSCOPY (EGD), COMBINED N/A 4/15/2022    Procedure: ESOPHAGOGASTRODUODENOSCOPY (EGD);  Surgeon: Erik Baca DO;  Location:  GI     EXCISE MASS HEAD Left 08/18/2016    Procedure: EXCISE MASS HEAD;  Surgeon: Ivan Hernandez MD;  Location:  SD     HEART CATH, ANGIOPLASTY  04/2012     LAPAROSCOPIC CHOLECYSTECTOMY N/A 03/17/2019    Procedure: LAPAROSCOPIC CHOLECYSTECTOMY;  Surgeon: Janel Paz MD;   Location:  OR     PHACOEMULSIFICATION CLEAR CORNEA WITH STANDARD INTRAOCULAR LENS IMPLANT Left 2015    Procedure: PHACOEMULSIFICATION CLEAR CORNEA WITH STANDARD INTRAOCULAR LENS IMPLANT;  Surgeon: Nixon Farnsworth MD;  Location:  EC     PHACOEMULSIFICATION CLEAR CORNEA WITH STANDARD INTRAOCULAR LENS IMPLANT Right 2015    Procedure: PHACOEMULSIFICATION CLEAR CORNEA WITH STANDARD INTRAOCULAR LENS IMPLANT;  Surgeon: Nixon Farnsworth MD;  Location:  EC     SEPTOPLASTY, TURBINOPLASTY, COMBINED Bilateral 2016    Procedure: COMBINED SEPTOPLASTY, TURBINOPLASTY;  Surgeon: Ivan Hernandez MD;  Location:  SD     ZZC NONSPECIFIC PROCEDURE      CABG ([place])     ZZC NONSPECIFIC PROCEDURE  2001    stress echo       FAMILY HISTORY:  Family History   Problem Relation Age of Onset     Genitourinary Problems Father         d: age 89; ARF     Hypertension Father      Diabetes Mother         d: age 68, CAD     Heart Disease Mother         MI     Myocardial Infarction Mother      Cardiovascular Brother         d:  age 31; CAD     Heart Disease Brother         age 31, MI     Diabetes Sister         b:  1939; DM2     Diabetes Sister        SOCIAL HISTORY:  Social History     Socioeconomic History     Marital status:      Spouse name: None     Number of children: None     Years of education: None     Highest education level: None   Tobacco Use     Smoking status: Former     Current packs/day: 0.00     Average packs/day: 1 pack/day for 6.5 years (6.5 ttl pk-yrs)     Types: Cigarettes, Cigars, Pipe     Start date:      Quit date: 1964     Years since quittin.1     Smokeless tobacco: Never   Vaping Use     Vaping status: Never Used   Substance and Sexual Activity     Alcohol use: Not Currently     Comment: couple drinks a year     Drug use: No     Sexual activity: Yes     Partners: Female     Birth control/protection: Abstinence   Other Topics Concern     Parent/sibling w/  CABG, MI or angioplasty before 65F 55M? No     Caffeine Concern Yes     Comment: 2 cups coffee a day     Sleep Concern Yes     Comment: sleep apnea, wears cpap off and on     Stress Concern No     Weight Concern No     Special Diet Yes     Comment: diabetic diet      Exercise No     Comment: occ walk the mall     Seat Belt Yes     Social Determinants of Health     Financial Resource Strain: Low Risk  (4/19/2024)    Financial Resource Strain      Within the past 12 months, have you or your family members you live with been unable to get utilities (heat, electricity) when it was really needed?: No   Food Insecurity: Low Risk  (4/19/2024)    Food Insecurity      Within the past 12 months, did you worry that your food would run out before you got money to buy more?: No      Within the past 12 months, did the food you bought just not last and you didn t have money to get more?: No   Transportation Needs: Low Risk  (4/19/2024)    Transportation Needs      Within the past 12 months, has lack of transportation kept you from medical appointments, getting your medicines, non-medical meetings or appointments, work, or from getting things that you need?: No   Physical Activity: Insufficiently Active (4/19/2024)    Exercise Vital Sign      Days of Exercise per Week: 3 days      Minutes of Exercise per Session: 30 min   Stress: No Stress Concern Present (4/19/2024)    [employer] - Occupational Stress Questionnaire      Feeling of Stress : Not at all   Social Connections: Unknown (4/19/2024)    Social Connection and Isolation Panel [NHANES]      Frequency of Social Gatherings with Friends and Family: Once a week   Interpersonal Safety: Low Risk  (4/24/2024)    Interpersonal Safety      Do you feel physically and emotionally safe where you currently live?: Yes      Within the past 12 months, have you been hit, slapped, kicked or otherwise physically hurt by someone?: No      Within the past 12 months,  "have you been humiliated or emotionally abused in other ways by your partner or ex-partner?: No   Housing Stability: Low Risk  (4/19/2024)    Housing Stability      Do you have housing? : Yes      Are you worried about losing your housing?: No       Review of Systems:  Skin:          Eyes:  Positive for glasses    ENT:  Positive for hearing loss    Respiratory:     No particular shortness of breath  Cardiovascular:  Negative      Gastroenterology:        Genitourinary:         Musculoskeletal:         Neurologic:         Psychiatric:         Heme/Lymph/Imm:         Endocrine:           Physical Exam:  Vitals: /60 (BP Location: Right arm, Patient Position: Sitting, Cuff Size: Adult Regular)   Pulse 70   Ht 1.803 m (5' 11\")   Wt 94.3 kg (207 lb 14.4 oz)   SpO2 94%   BMI 29.00 kg/m      General Patient appears comfortable  Neck normal JVP  Cardiovascular system grade 2 x 6 ejection systolic murmur with early systolic peaking over the right upper sternal border, no S3-S4 rub or gallop  Respiratory system clear to auscultation  Extremities no edema      CC  Andrew Elizalde MD  [address] [place] [id number]                  Thank you for allowing me to participate in the care of your patient.      Sincerely,     Phoenix Adams MD     [facility]  cc:   Andrew Elizalde MD  [address] [place] [id number]      "

## 2024-08-06 NOTE — PROGRESS NOTES
HPI and Plan:   Mr Perea is a very pleasant 84-year-old gentleman with the history of CAD with history of CABG in 1997, atrial fibrillation flutter with history of cardioversion and patient was on amiodarone in the past but this was discontinued because of abnormal liver enzymes, on apixaban for stroke prophylaxis ,hospitalization for decompensated congestive heart failure in December 2021 in the setting of diuretics being discontinued a few months prior to admission due to hypercalcemia and patient was found to have congestive heart failure with moderately decreased RV systolic function with severe pulmonary hypertension, underwent diuresis and lost around 13 pounds of weight.  Patient also has history of sleep apnea diagnosed about 12 years ago and and started using CPAP since August 2022.  He also had a repeat echocardiogram done around the same time (August 2022) that showed LVEF of 60% with moderate LVH moderately dilated RV with moderately decreased RV systolic function with moderate pulm hypertension with RVSP of 48 mmHg plus RA with mild to moderate aortic stenosis.  To be noted echocardiogram done in December 2021 showed RVSP of 70 mg hg plus RA.  He also had a Holter evaluation done that showed rhythm to be persistent atrial fibrillation with average ventricular rate of 80 bpm low of 61 high of 113 without any need for AV taylor blocking agent.   Patient underwent cardiac MRI October 2022 that did not reveal any inducible ischemia.  Normal LV systolic function, mildly dilated RV with normal RV systolic function with mild flattening of interventricular septum possibly from RV volume and pressure overload.  Some non CAD scarring in the septum with RV and attachment regions which could be seen with pulmonary hypertension.  No evidence of infiltrative cardiomyopathy.  Patient was also seen in the ER around the same time (October 2022) because of weight gain of about 8 to 10 pounds with some shortness of  breath and shortness of breath with exertion.  He was diagnosed with some decompressed congestive heart failure in the ER and his torsemide was increased and did well after that and is now on 20 mg daily of torsemide.  He has done well from cardiac standpoint of view since that time.  He had a repeat echocardiogram in 2023 that showed normal LV and RV systolic function, normal RV size, mild aortic valve stenosis, underlying rhythm to be atrial fibrillation.  Today is coming for routine follow-up.  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  He denies any exertional symptoms of chest discomfort shortness breath dizziness presyncope or syncope.  No recent falls, no bleeding issues.        Assessment plan  1.  History of preserved ejection fraction congestive heart failure with history of right-sided congestive heart failure.  Now appears euvolemic compensated on torsemide.  Latest echocardiogram showing normal LV function normal RV size and systolic function.  Mild LVH on echocardiogram.   No evidence of infiltrative cardiomyopathy on cardiac MRI.   2.  History of severe pulmonary hypertension.    Was initially noted in the setting of volume overload and untreated sleep apnea.  There was significant RV enlargement and dysfunction both of this has improved to normal on last echocardiogram.   3.  Chronic atrial fibrillation flutter with, ventricular rates well controlled without any need for AV taylor blocking agents. Holter showed well-controlled ventricular rates with rhythm to be atrial fibrillation.  No dizziness presyncope syncope Was on amiodarone in the past but this was discontinued because of abnormal liver enzymes.  Chads 2 Vascor of at least 6.  On apixaban for stroke prophylaxis.     4. CAD with history of CABG in 1997.  Stress test in June 2021 showing small apical infarct.  Clinically no  anginal symptoms.  On high intensity statin LDL well controlled.  Cardiac MRI stress perfusion in October 2022 with no evidence of inducible ischemia.  On apixaban, high intensity statin, LDL well controlled, also on fenofibrate for hypertriglyceridemia, triglycerides 324.  5.  Obstructive sleep apnea.    On CPAP with significant improvement in energy level while using CPAP and feeling more refreshed after sleep now.  6.  Mild aortic valve stenosis  7.  Chronic kidney disease stage III.   Creatinine mostly above 1.5  8.  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  Being followed by oncology plan is chemotherapy and radiation therapy in near future.    Recommendations  Overall Juice satisfies he is doing well.  Continue apixaban and statin  Follow-up in a year with an echocardiogram, sooner if he notes any change in clinical status.          Orders Placed This Encounter   Procedures    Follow-Up with Cardiology    Echocardiogram Complete       No orders of the defined types were placed in this encounter.      There are no discontinued medications.      Encounter Diagnosis   Name Primary?    Atrial fibrillation, unspecified type (H)        CURRENT MEDICATIONS:  Current Outpatient Medications   Medication Sig Dispense Refill    apixaban ANTICOAGULANT (ELIQUIS ANTICOAGULANT) 2.5 MG tablet Take 1 tablet (2.5 mg) by mouth 2 times daily 180 tablet 3    atorvastatin (LIPITOR) 40 MG tablet TAKE 1 TABLET (40 MG) BY MOUTH DAILY 90 tablet 2    azelastine 137 MCG/SPRAY SOLN USE 2 SPRAYS INTO BOTH NOSTRILS 2 TIMES A DAY 30 mL 0    blood glucose (ACCU-CHEK NORMAN PLUS) test strip USE TO TEST BLOOD SUGAR 3 TIMES A DAY OR AS DIRECTED 100 strip 11    blood glucose calibration (ACCU-CHEK NORMAN) solution USE AS DIRECTED 1 each 0    Continuous Blood Gluc  (FREESTYLE DELFINA 2 READER) CHRYSTAL 1 Device continuous 1 each 1    Continuous Blood Gluc Sensor (FREESTYLE DELFINA 2 SENSOR) MISC APPLY 1  SENSOR AND CHANGE EVERY 14 DAYS AS DIRECTED 6 each 3    fenofibrate micronized (LOFIBRA) 67 MG capsule TAKE 1 CAPSULE BY MOUTH EVERY MORNING BEFORE BREAKFAST 90 capsule 3    glucose 4 g CHEW Take 1 tablet by mouth every hour as needed for low blood sugar       Insulin Aspart FlexPen 100 UNIT/ML SOPN Inject 6-10 Units Subcutaneous 3 times daily (with meals) Plus priming      insulin glargine (LANTUS SOLOSTAR) 100 UNIT/ML pen INJECT 31 UNITS UNDER THE SKIN EVERY 12 HOURS 30 mL 4    insulin pen needle (GLOBAL EASE INJECT PEN NEEDLES) 31G X 5 MM miscellaneous USE 1 PENTIP AS DIRECTED 4 TIMES A  each 3    isosorbide mononitrate (IMDUR) 30 MG 24 hr tablet Take 1 tablet (30 mg) by mouth daily 90 tablet 3    levothyroxine (SYNTHROID/LEVOTHROID) 125 MCG tablet Take 1 tablet (125 mcg) by mouth daily 90 tablet 3    nitroGLYcerin (NITROSTAT) 0.4 MG sublingual tablet Place 1 tablet (0.4 mg) under the tongue every 5 minutes as needed for chest pain 25 tablet 3    torsemide (DEMADEX) 10 MG tablet Take 2 tablets (20 mg) by mouth daily 180 tablet 3       ALLERGIES     Allergies   Allergen Reactions    Omeprazole      diarrhea    Pantoprazole      diarrhea       PAST MEDICAL HISTORY:  Past Medical History:   Diagnosis Date    Adhesive capsulitis of shoulder     Anemia 03/10/2014    Anemia, unspecified     Antiplatelet or antithrombotic long-term use     Arrhythmia     Atrial fib/flutter    CAD (coronary artery disease)     Gastroparesis     delayed emptying study May 2022    History of cardioversion 04/24/2018    a single synchronized shock of 120 joules restored normal sinus rhythm    History of cardioversion 02/13/2019    single shock , 200 joules successul in restoring NSR    Hyperlipidemia LDL goal <70 05/26/2011    Hypertension     Hypothyroidism     Impotence of organic origin     Laceration of finger 06/2010     left thumb s/p sutures    Numbness and tingling     diabetic neuropathy bilateral feet    BRANDON (obstructive  sleep apnea)     intolerant of CPAP, uses it occasionally.  Using mandibular device occasionally    Osteopenia     Pulmonary hypertension (H) 04/06/2012    Sensorineural hearing loss, unspecified     Stented coronary artery 1997    CABG     Testosterone deficiency     Type 2 diabetes mellitus without complication  (goal A1C<8) 10/24/2015    Typical atrial flutter (H) 04/18/2016    Unspecified hypothyroidism     Vitamin D deficiency 09/03/2011       PAST SURGICAL HISTORY:  Past Surgical History:   Procedure Laterality Date    ANESTHESIA CARDIOVERSION N/A 02/13/2019    Procedure: ANESTHESIA CARDIOVERSION;  Surgeon: GENERIC ANESTHESIA PROVIDER;  Location:  OR    ANESTHESIA CARDIOVERSION N/A 05/22/2019    Procedure: ANESTHESIA, FOR CARDIOVERSION;  Surgeon: GENERIC ANESTHESIA PROVIDER;  Location:  OR    CARDIAC SURGERY      bypass in 1997    CARDIOVERSION  04/24/2018    COLONOSCOPY      CORONARY ANGIOGRAPHY ADULT ORDER      4/2/2012    CORONARY ARTERY BYPASS  1997    [facility]    ENDOSCOPIC ULTRASOUND UPPER GASTROINTESTINAL TRACT (GI) N/A 03/14/2019    Procedure: ENDOSCOPIC ULTRASOUND OF UPPER GASTROINTESTINAL TRACT;  Surgeon: Ju Torres MD;  Location:  OR    ESOPHAGOSCOPY, GASTROSCOPY, DUODENOSCOPY (EGD), COMBINED N/A 4/15/2022    Procedure: ESOPHAGOGASTRODUODENOSCOPY (EGD);  Surgeon: Erik Baca DO;  Location:  GI    EXCISE MASS HEAD Left 08/18/2016    Procedure: EXCISE MASS HEAD;  Surgeon: Ivan Hernandez MD;  Location:  SD    HEART CATH, ANGIOPLASTY  04/2012    LAPAROSCOPIC CHOLECYSTECTOMY N/A 03/17/2019    Procedure: LAPAROSCOPIC CHOLECYSTECTOMY;  Surgeon: Janel Paz MD;  Location:  OR    PHACOEMULSIFICATION CLEAR CORNEA WITH STANDARD INTRAOCULAR LENS IMPLANT Left 06/08/2015    Procedure: PHACOEMULSIFICATION CLEAR CORNEA WITH STANDARD INTRAOCULAR LENS IMPLANT;  Surgeon: Nixon Farnsworth MD;  Location:  EC    PHACOEMULSIFICATION CLEAR CORNEA  WITH STANDARD INTRAOCULAR LENS IMPLANT Right 2015    Procedure: PHACOEMULSIFICATION CLEAR CORNEA WITH STANDARD INTRAOCULAR LENS IMPLANT;  Surgeon: Nixon Farnsworth MD;  Location:  EC    SEPTOPLASTY, TURBINOPLASTY, COMBINED Bilateral 2016    Procedure: COMBINED SEPTOPLASTY, TURBINOPLASTY;  Surgeon: Ivan Hernandez MD;  Location: [facility]    ZZC NONSPECIFIC PROCEDURE      CABG ([facility])    Z NONSPECIFIC PROCEDURE  2001    stress echo       FAMILY HISTORY:  Family History   Problem Relation Age of Onset    Genitourinary Problems Father         d: age 89; ARF    Hypertension Father     Diabetes Mother         d: age 68, CAD    Heart Disease Mother         MI    Myocardial Infarction Mother     Cardiovascular Brother         d:  age 31; CAD    Heart Disease Brother         age 31, MI    Diabetes Sister         b:  1939; DM2    Diabetes Sister        SOCIAL HISTORY:  Social History     Socioeconomic History    Marital status:      Spouse name: None    Number of children: None    Years of education: None    Highest education level: None   Tobacco Use    Smoking status: Former     Current packs/day: 0.00     Average packs/day: 1 pack/day for 6.5 years (6.5 ttl pk-yrs)     Types: Cigarettes, Cigars, Pipe     Start date:      Quit date: 1964     Years since quittin.1    Smokeless tobacco: Never   Vaping Use    Vaping status: Never Used   Substance and Sexual Activity    Alcohol use: Not Currently     Comment: couple drinks a year    Drug use: No    Sexual activity: Yes     Partners: Female     Birth control/protection: Abstinence   Other Topics Concern    Parent/sibling w/ CABG, MI or angioplasty before 65F 55M? No    Caffeine Concern Yes     Comment: 2 cups coffee a day    Sleep Concern Yes     Comment: sleep apnea, wears cpap off and on    Stress Concern No    Weight Concern No    Special Diet Yes     Comment: diabetic diet     Exercise No     Comment: occ walk the mall     Seat Belt Yes     Social Determinants of Health     Financial Resource Strain: Low Risk  (4/19/2024)    Financial Resource Strain     Within the past 12 months, have you or your family members you live with been unable to get utilities (heat, electricity) when it was really needed?: No   Food Insecurity: Low Risk  (4/19/2024)    Food Insecurity     Within the past 12 months, did you worry that your food would run out before you got money to buy more?: No     Within the past 12 months, did the food you bought just not last and you didn t have money to get more?: No   Transportation Needs: Low Risk  (4/19/2024)    Transportation Needs     Within the past 12 months, has lack of transportation kept you from medical appointments, getting your medicines, non-medical meetings or appointments, work, or from getting things that you need?: No   Physical Activity: Insufficiently Active (4/19/2024)    Exercise Vital Sign     Days of Exercise per Week: 3 days     Minutes of Exercise per Session: 30 min   Stress: No Stress Concern Present (4/19/2024)    [place] [employer] - Occupational Stress Questionnaire     Feeling of Stress : Not at all   Social Connections: Unknown (4/19/2024)    Social Connection and Isolation Panel [NHANES]     Frequency of Social Gatherings with Friends and Family: Once a week   Interpersonal Safety: Low Risk  (4/24/2024)    Interpersonal Safety     Do you feel physically and emotionally safe where you currently live?: Yes     Within the past 12 months, have you been hit, slapped, kicked or otherwise physically hurt by someone?: No     Within the past 12 months, have you been humiliated or emotionally abused in other ways by your partner or ex-partner?: No   Housing Stability: Low Risk  (4/19/2024)    Housing Stability     Do you have housing? : Yes     Are you worried about losing your housing?: No       Review of Systems:  Skin:          Eyes:  Positive for glasses    ENT:   "Positive for hearing loss    Respiratory:     No particular shortness of breath  Cardiovascular:  Negative      Gastroenterology:        Genitourinary:         Musculoskeletal:         Neurologic:         Psychiatric:         Heme/Lymph/Imm:         Endocrine:           Physical Exam:  Vitals: /60 (BP Location: Right arm, Patient Position: Sitting, Cuff Size: Adult Regular)   Pulse 70   Ht 1.803 m (5' 11\")   Wt 94.3 kg (207 lb 14.4 oz)   SpO2 94%   BMI 29.00 kg/m      General Patient appears comfortable  Neck normal JVP  Cardiovascular system grade 2 x 6 ejection systolic murmur with early systolic peaking over the right upper sternal border, no S3-S4 rub or gallop  Respiratory system clear to auscultation  Extremities no edema      CC  Andrew Elizalde MD  [address] [place] [address]                "

## 2024-08-07 ENCOUNTER — TELEPHONE (OUTPATIENT)
Dept: INTERVENTIONAL RADIOLOGY/VASCULAR | Facility: CLINIC | Age: 84
End: 2024-08-07

## 2024-08-07 RX ORDER — NALOXONE HYDROCHLORIDE 0.4 MG/ML
0.2 INJECTION, SOLUTION INTRAMUSCULAR; INTRAVENOUS; SUBCUTANEOUS
Status: CANCELLED | OUTPATIENT
Start: 2024-08-07

## 2024-08-07 RX ORDER — FLUMAZENIL 0.1 MG/ML
0.2 INJECTION, SOLUTION INTRAVENOUS
Status: CANCELLED | OUTPATIENT
Start: 2024-08-07

## 2024-08-07 RX ORDER — NALOXONE HYDROCHLORIDE 0.4 MG/ML
0.4 INJECTION, SOLUTION INTRAMUSCULAR; INTRAVENOUS; SUBCUTANEOUS
Status: CANCELLED | OUTPATIENT
Start: 2024-08-07

## 2024-08-07 RX ORDER — FENTANYL CITRATE 50 UG/ML
25-50 INJECTION, SOLUTION INTRAMUSCULAR; INTRAVENOUS EVERY 5 MIN PRN
Status: CANCELLED | OUTPATIENT
Start: 2024-08-07

## 2024-08-07 RX ORDER — LIDOCAINE 40 MG/G
CREAM TOPICAL
Status: CANCELLED | OUTPATIENT
Start: 2024-08-07

## 2024-08-07 RX ORDER — HEPARIN SODIUM,PORCINE 10 UNIT/ML
5-10 VIAL (ML) INTRAVENOUS EVERY 24 HOURS
Status: CANCELLED | OUTPATIENT
Start: 2024-08-07

## 2024-08-07 RX ORDER — ACETAMINOPHEN 325 MG/1
325 TABLET ORAL
Status: CANCELLED | OUTPATIENT
Start: 2024-08-07

## 2024-08-07 RX ORDER — HEPARIN SODIUM (PORCINE) LOCK FLUSH IV SOLN 100 UNIT/ML 100 UNIT/ML
5-10 SOLUTION INTRAVENOUS
Status: CANCELLED | OUTPATIENT
Start: 2024-08-07

## 2024-08-07 RX ORDER — HEPARIN SODIUM,PORCINE 10 UNIT/ML
5-10 VIAL (ML) INTRAVENOUS
Status: CANCELLED | OUTPATIENT
Start: 2024-08-07

## 2024-08-07 RX ORDER — CEFAZOLIN SODIUM 2 G/100ML
2 INJECTION, SOLUTION INTRAVENOUS
Status: CANCELLED | OUTPATIENT
Start: 2024-08-07

## 2024-08-07 RX ORDER — SODIUM CHLORIDE 9 MG/ML
INJECTION, SOLUTION INTRAVENOUS CONTINUOUS
Status: CANCELLED | OUTPATIENT
Start: 2024-08-07

## 2024-08-09 ENCOUNTER — HOSPITAL ENCOUNTER (OUTPATIENT)
Facility: CLINIC | Age: 84
Discharge: HOME | End: 2024-08-09
Admitting: RADIOLOGY
Payer: COMMERCIAL

## 2024-08-09 ENCOUNTER — APPOINTMENT (OUTPATIENT)
Dept: INTERVENTIONAL RADIOLOGY/VASCULAR | Facility: CLINIC | Age: 84
End: 2024-08-09
Attending: INTERNAL MEDICINE
Payer: COMMERCIAL

## 2024-08-09 ENCOUNTER — DOCUMENTATION ONLY (OUTPATIENT)
Dept: PHARMACY | Facility: CLINIC | Age: 84
End: 2024-08-09
Payer: COMMERCIAL

## 2024-08-09 VITALS
TEMPERATURE: 98.2 F | BODY MASS INDEX: 28.06 KG/M2 | HEART RATE: 61 BPM | OXYGEN SATURATION: 95 % | HEIGHT: 71 IN | WEIGHT: 200.4 LBS | SYSTOLIC BLOOD PRESSURE: 128 MMHG | RESPIRATION RATE: 16 BRPM | DIASTOLIC BLOOD PRESSURE: 64 MMHG

## 2024-08-09 DIAGNOSIS — C7A.1 MALIGNANT POORLY DIFFERENTIATED NEUROENDOCRINE CARCINOMA (H): ICD-10-CM

## 2024-08-09 LAB
ERYTHROCYTE [DISTWIDTH] IN BLOOD BY AUTOMATED COUNT: 14.4 % (ref 10–15)
GLUCOSE BLDC GLUCOMTR-MCNC: 114 MG/DL (ref 70–99)
HCT VFR BLD AUTO: 34 % (ref 40–53)
HGB BLD-MCNC: 11.1 G/DL (ref 13.3–17.7)
MCH RBC QN AUTO: 28.2 PG (ref 26.5–33)
MCHC RBC AUTO-ENTMCNC: 32.6 G/DL (ref 31.5–36.5)
MCV RBC AUTO: 87 FL (ref 78–100)
PLATELET # BLD AUTO: 158 10E3/UL (ref 150–450)
RBC # BLD AUTO: 3.93 10E6/UL (ref 4.4–5.9)
WBC # BLD AUTO: 4.2 10E3/UL (ref 4–11)

## 2024-08-09 PROCEDURE — 272N000196 HC ACCESSORY CR5

## 2024-08-09 PROCEDURE — C1769 GUIDE WIRE: HCPCS

## 2024-08-09 PROCEDURE — 250N000011 HC RX IP 250 OP 636: Performed by: NURSE PRACTITIONER

## 2024-08-09 PROCEDURE — 82962 GLUCOSE BLOOD TEST: CPT

## 2024-08-09 PROCEDURE — 99152 MOD SED SAME PHYS/QHP 5/>YRS: CPT

## 2024-08-09 PROCEDURE — 85014 HEMATOCRIT: CPT | Performed by: NURSE PRACTITIONER

## 2024-08-09 PROCEDURE — 999N000163 HC STATISTIC SIMPLE TUBE INSERTION/CHARGE, PORT, CATH, FISTULOGRAM

## 2024-08-09 PROCEDURE — 250N000011 HC RX IP 250 OP 636: Performed by: PHYSICIAN ASSISTANT

## 2024-08-09 PROCEDURE — 36415 COLL VENOUS BLD VENIPUNCTURE: CPT | Performed by: NURSE PRACTITIONER

## 2024-08-09 PROCEDURE — 250N000009 HC RX 250: Performed by: NURSE PRACTITIONER

## 2024-08-09 PROCEDURE — C1788 PORT, INDWELLING, IMP: HCPCS

## 2024-08-09 RX ORDER — NALOXONE HYDROCHLORIDE 0.4 MG/ML
0.2 INJECTION, SOLUTION INTRAMUSCULAR; INTRAVENOUS; SUBCUTANEOUS
Status: DISCONTINUED | OUTPATIENT
Start: 2024-08-09 | End: 2024-08-09 | Stop reason: HOSPADM

## 2024-08-09 RX ORDER — FENTANYL CITRATE 50 UG/ML
25-50 INJECTION, SOLUTION INTRAMUSCULAR; INTRAVENOUS EVERY 5 MIN PRN
Status: DISCONTINUED | OUTPATIENT
Start: 2024-08-09 | End: 2024-08-09 | Stop reason: HOSPADM

## 2024-08-09 RX ORDER — HEPARIN SODIUM (PORCINE) LOCK FLUSH IV SOLN 100 UNIT/ML 100 UNIT/ML
500 SOLUTION INTRAVENOUS ONCE
Status: COMPLETED | OUTPATIENT
Start: 2024-08-09 | End: 2024-08-09

## 2024-08-09 RX ORDER — FLUMAZENIL 0.1 MG/ML
0.2 INJECTION, SOLUTION INTRAVENOUS
Status: DISCONTINUED | OUTPATIENT
Start: 2024-08-09 | End: 2024-08-09 | Stop reason: HOSPADM

## 2024-08-09 RX ORDER — ACETAMINOPHEN 325 MG/1
650 TABLET ORAL
Status: DISCONTINUED | OUTPATIENT
Start: 2024-08-09 | End: 2024-08-09 | Stop reason: HOSPADM

## 2024-08-09 RX ORDER — CEFAZOLIN SODIUM 2 G/100ML
2 INJECTION, SOLUTION INTRAVENOUS
Status: COMPLETED | OUTPATIENT
Start: 2024-08-09 | End: 2024-08-09

## 2024-08-09 RX ORDER — NALOXONE HYDROCHLORIDE 0.4 MG/ML
0.4 INJECTION, SOLUTION INTRAMUSCULAR; INTRAVENOUS; SUBCUTANEOUS
Status: DISCONTINUED | OUTPATIENT
Start: 2024-08-09 | End: 2024-08-09 | Stop reason: HOSPADM

## 2024-08-09 RX ADMIN — FENTANYL CITRATE 50 MCG: 50 INJECTION, SOLUTION INTRAMUSCULAR; INTRAVENOUS at 13:12

## 2024-08-09 RX ADMIN — MIDAZOLAM 0.5 MG: 1 INJECTION INTRAMUSCULAR; INTRAVENOUS at 13:21

## 2024-08-09 RX ADMIN — MIDAZOLAM 1 MG: 1 INJECTION INTRAMUSCULAR; INTRAVENOUS at 13:10

## 2024-08-09 RX ADMIN — MIDAZOLAM 0.5 MG: 1 INJECTION INTRAMUSCULAR; INTRAVENOUS at 13:14

## 2024-08-09 RX ADMIN — FENTANYL CITRATE 25 MCG: 50 INJECTION, SOLUTION INTRAMUSCULAR; INTRAVENOUS at 13:15

## 2024-08-09 RX ADMIN — CEFAZOLIN SODIUM 2 G: 2 INJECTION, SOLUTION INTRAVENOUS at 12:20

## 2024-08-09 RX ADMIN — FENTANYL CITRATE 25 MCG: 50 INJECTION, SOLUTION INTRAMUSCULAR; INTRAVENOUS at 13:21

## 2024-08-09 RX ADMIN — HEPARIN SODIUM (PORCINE) LOCK FLUSH IV SOLN 100 UNIT/ML 500 UNITS: 100 SOLUTION at 13:26

## 2024-08-09 RX ADMIN — LIDOCAINE HYDROCHLORIDE 20 ML: 10 INJECTION, SOLUTION INFILTRATION; PERINEURAL at 13:12

## 2024-08-09 ASSESSMENT — ACTIVITIES OF DAILY LIVING (ADL)
ADLS_ACUITY_SCORE: 37

## 2024-08-09 NOTE — IR NOTE
Interventional Radiology Intra-procedural Nursing Note    Patient Name: Pascual Perea  Medical Record Number: [id number]  Today's Date: August 9, 2024    Procedure:  Right chest power port placement with image guidance and IV moderate sedation  Start time: 1309  End time: 1323  Report provided to: ROLAND Mcdonald  Patient depart time and location: 1330 to  Room 12    Note: Patient entered Interventional Radiology Suite number 2 via cart. Patient awake, alert and oriented. Assisted onto procedural table in supine position. Prepped and draped.  Dr. Bhatia in room. Time out and procedure started. Vital signs stable. Telemetry reading CAF.    Procedure well tolerated by patient without complications. Procedure end with debrief by Dr. Bhatia.  Sutures, glue, steri strips, gauze and tegaderm dressing applied to right interventional procedure access site, dressing is c/d/I.    Administered medication totals:  Lidocaine 1% 20 mL Intradermal  Heparin 500 Units IVP to hep lock port  Versed 2 mg IVP  Fentanyl 100 mcg IVP    Last dose of sedation administered at 1321.

## 2024-08-09 NOTE — DISCHARGE INSTRUCTIONS
Port Insertion Discharge Instructions     After you go home:    Have an adult stay with you for the first 6 hours  You may resume your normal diet       For 24 hours - due to the sedation you received:  Relax and take it easy  Do NOT make any important or legal decisions  Do NOT drive or operate machines at home or at work  Do NOT drink alcohol    Care of Puncture Sites:    Keep the dressings on your sites clean & dry for 24-48 hours. Change it only if it gets wet or dirty.  You may take a shower 24-48 hours after your procedure. Do not scrub site.  No submerging site for 2 weeks or until the incision is completely healed.  Do not remove the small white strips of tape, if present. Allow them to fall off on their own.   You may cover the wound with a bandaid if needed for comfort.      Activity     Avoid heavy lifting (greater than 10 pounds) or the overuse of your shoulder for 48-72 hours.    Bleeding:    If you start bleeding from the incision sites in your chest or neck - or have swelling in your neck, sit down and press on the site for 5-10 minutes.   If bleeding has not stopped after 10 minutes, call your provider.        Call 911 right away if you have heavy bleeding or bleeding that does not stop.      Medicines:    You may resume all medications  Resume your Platelet Inhibitors and Aspirin tomorrow at your regular dose  For minor pain, you may take Acetaminophen (Tylenol) or Ibuprofen (Advil)    Call the provider who ordered this procedure if:    You have swelling in your neck or over your port site  The incision area is red, swollen, hot or tender  You have chills or a fever greater than 101 F (38 C)  Any questions or concerns    Call  911 or go to the Emergency Room if you have:    Severe chest pain or trouble breathing  Bleeding that you cannot control    Additional Information:    Your port may be accessed right away.   You will need to have your port flushed every 30 days or after each use.      If you  have questions call:          [facility] Radiology Dept @ [phone]    Or you can contact your provider via My Chart

## 2024-08-09 NOTE — PROGRESS NOTES
Care Suites Post Procedure Note    Patient Information  Name: Pascual Perea  Age: 84 year old    Post Procedure  Time patient returned to Care Suites: 1335  Concerns/abnormal assessment: None at this time.  If abnormal assessment, provider notified: N/A  Plan/Other: Per orders/post procedure protocol.    Tamara Alfaro RN

## 2024-08-09 NOTE — PRE-PROCEDURE
GENERAL PRE-PROCEDURE:   Procedure:  Right chest port placement with image guidance  Date/Time:  8/9/2024 11:16 AM    Written consent obtained?: Yes    Risks and benefits: Risks, benefits and alternatives were discussed    Consent given by:  Patient  Patient states understanding of procedure being performed: Yes    Patient's understanding of procedure matches consent: Yes    Procedure consent matches procedure scheduled: Yes    Expected level of sedation:  Moderate  Appropriately NPO:  Yes  ASA Class:  3  Mallampati  :  Grade 2- soft palate, base of uvula, tonsillar pillars, and portion of posterior pharyngeal wall visible  Lungs:  Lungs clear with good breath sounds bilaterally  Heart:  Normal heart sounds and rate  History & Physical reviewed:  History and physical reviewed and no updates needed  Statement of review:  I have reviewed the lab findings, diagnostic data, medications, and the plan for sedation    Josafat Rossi PA-C  Interventional Radiology  [phone] (IR)  *[phone] ([facility] Office)

## 2024-08-09 NOTE — PROGRESS NOTES
Care Suites Admission Nursing Note    Patient Information  Name: Pascual Perea  Age: 84 year old  Reason for admission: New port placement  Care Suites arrival time: 1010    Visitor Information  Name: Sidney     Patient Admission/Assessment   Pre-procedure assessment complete: Yes  If abnormal assessment/labs, provider notified: N/A  NPO: Yes  Medications held per instructions/orders: N/A  Consent: obtained  If applicable, pregnancy test status: deferred  Patient oriented to room: Yes  Education/questions answered: Yes  Plan/other: Proceed as ordered.    Discharge Planning  Discharge name/phone number: Sidney-son [phone]  Overnight post sedation caregiver: Sidney will stay with [facility] for 6 hours post procedure per protocol.  Discharge location: home    1055  AVS/Discharge instructions given and reviewed.  All questions and concerns addressed at this time with verbal understanding received.    Tamara Alfaro RN

## 2024-08-09 NOTE — PROGRESS NOTES
Care Suites Discharge Nursing Note    Patient Information  Name: Pascual Perea  Age: 84 year old    Discharge Education:  Discharge instructions reviewed: Yes  Additional education/resources provided: AVS/Discharge instructions and smart port booklet/card.  Patient/patient representative verbalizes understanding: Yes  Patient discharging on new medications: No  Medication education completed: N/A    Discharge Plans:   Discharge location: home  Discharge ride contacted: Yes  Approximate discharge time: 1450    Discharge Criteria:  Discharge criteria met and vital signs stable: Yes. Denies pain. Tolerated PO snack. Steady on his feet. Voided. Sites remains CDI, no swelling.    Patient Belongs:  Patient belongings returned to patient: Yes    Tamara Alfaro RN

## 2024-08-13 ENCOUNTER — DOCUMENTATION ONLY (OUTPATIENT)
Dept: ONCOLOGY | Facility: CLINIC | Age: 84
End: 2024-08-13
Payer: COMMERCIAL

## 2024-08-14 ENCOUNTER — ONCOLOGY VISIT (OUTPATIENT)
Dept: ONCOLOGY | Facility: CLINIC | Age: 84
End: 2024-08-14
Attending: INTERNAL MEDICINE
Payer: COMMERCIAL

## 2024-08-14 ENCOUNTER — INFUSION THERAPY VISIT (OUTPATIENT)
Dept: INFUSION THERAPY | Facility: CLINIC | Age: 84
End: 2024-08-14
Attending: INTERNAL MEDICINE
Payer: COMMERCIAL

## 2024-08-14 VITALS
TEMPERATURE: 97.4 F | OXYGEN SATURATION: 94 % | WEIGHT: 205 LBS | SYSTOLIC BLOOD PRESSURE: 132 MMHG | DIASTOLIC BLOOD PRESSURE: 67 MMHG | RESPIRATION RATE: 18 BRPM | HEART RATE: 77 BPM | BODY MASS INDEX: 28.59 KG/M2

## 2024-08-14 VITALS
HEART RATE: 77 BPM | OXYGEN SATURATION: 94 % | SYSTOLIC BLOOD PRESSURE: 132 MMHG | WEIGHT: 205 LBS | RESPIRATION RATE: 18 BRPM | BODY MASS INDEX: 28.59 KG/M2 | DIASTOLIC BLOOD PRESSURE: 67 MMHG | TEMPERATURE: 97.4 F

## 2024-08-14 DIAGNOSIS — C7A.1 MALIGNANT POORLY DIFFERENTIATED NEUROENDOCRINE CARCINOMA (H): Primary | ICD-10-CM

## 2024-08-14 DIAGNOSIS — C7A.1 MALIGNANT POORLY DIFFERENTIATED NEUROENDOCRINE CARCINOMA (H): ICD-10-CM

## 2024-08-14 DIAGNOSIS — C7A.1 HIGH GRADE NEUROENDOCRINE CARCINOMA (H): Primary | ICD-10-CM

## 2024-08-14 DIAGNOSIS — T45.1X5A CHEMOTHERAPY-INDUCED NEUTROPENIA (H): ICD-10-CM

## 2024-08-14 DIAGNOSIS — D70.1 CHEMOTHERAPY-INDUCED NEUTROPENIA (H): ICD-10-CM

## 2024-08-14 LAB
ALBUMIN SERPL BCG-MCNC: 4.2 G/DL (ref 3.5–5.2)
ALP SERPL-CCNC: 67 U/L (ref 40–150)
ALT SERPL W P-5'-P-CCNC: 17 U/L (ref 0–70)
ANION GAP SERPL CALCULATED.3IONS-SCNC: 9 MMOL/L (ref 7–15)
AST SERPL W P-5'-P-CCNC: 30 U/L (ref 0–45)
BASOPHILS # BLD AUTO: 0 10E3/UL (ref 0–0.2)
BASOPHILS NFR BLD AUTO: 1 %
BILIRUB SERPL-MCNC: 0.6 MG/DL
BUN SERPL-MCNC: 21.2 MG/DL (ref 8–23)
CALCIUM SERPL-MCNC: 10.6 MG/DL (ref 8.8–10.4)
CHLORIDE SERPL-SCNC: 102 MMOL/L (ref 98–107)
CREAT SERPL-MCNC: 1.37 MG/DL (ref 0.67–1.17)
EGFRCR SERPLBLD CKD-EPI 2021: 51 ML/MIN/1.73M2
EOSINOPHIL # BLD AUTO: 0.1 10E3/UL (ref 0–0.7)
EOSINOPHIL NFR BLD AUTO: 4 %
ERYTHROCYTE [DISTWIDTH] IN BLOOD BY AUTOMATED COUNT: 14.4 % (ref 10–15)
GLUCOSE SERPL-MCNC: 146 MG/DL (ref 70–99)
HCO3 SERPL-SCNC: 28 MMOL/L (ref 22–29)
HCT VFR BLD AUTO: 32.9 % (ref 40–53)
HGB BLD-MCNC: 10.9 G/DL (ref 13.3–17.7)
IMM GRANULOCYTES # BLD: 0 10E3/UL
IMM GRANULOCYTES NFR BLD: 0 %
LYMPHOCYTES # BLD AUTO: 0.8 10E3/UL (ref 0.8–5.3)
LYMPHOCYTES NFR BLD AUTO: 20 %
MCH RBC QN AUTO: 28.5 PG (ref 26.5–33)
MCHC RBC AUTO-ENTMCNC: 33.1 G/DL (ref 31.5–36.5)
MCV RBC AUTO: 86 FL (ref 78–100)
MONOCYTES # BLD AUTO: 0.4 10E3/UL (ref 0–1.3)
MONOCYTES NFR BLD AUTO: 10 %
NEUTROPHILS # BLD AUTO: 2.4 10E3/UL (ref 1.6–8.3)
NEUTROPHILS NFR BLD AUTO: 65 %
NRBC # BLD AUTO: 0 10E3/UL
NRBC BLD AUTO-RTO: 0 /100
PLATELET # BLD AUTO: 197 10E3/UL (ref 150–450)
POTASSIUM SERPL-SCNC: 4.3 MMOL/L (ref 3.4–5.3)
PROT SERPL-MCNC: 7 G/DL (ref 6.4–8.3)
RBC # BLD AUTO: 3.82 10E6/UL (ref 4.4–5.9)
SODIUM SERPL-SCNC: 139 MMOL/L (ref 135–145)
WBC # BLD AUTO: 3.7 10E3/UL (ref 4–11)

## 2024-08-14 PROCEDURE — 258N000003 HC RX IP 258 OP 636: Performed by: INTERNAL MEDICINE

## 2024-08-14 PROCEDURE — 250N000011 HC RX IP 250 OP 636: Performed by: INTERNAL MEDICINE

## 2024-08-14 PROCEDURE — 99214 OFFICE O/P EST MOD 30 MIN: CPT | Performed by: INTERNAL MEDICINE

## 2024-08-14 PROCEDURE — 80053 COMPREHEN METABOLIC PANEL: CPT | Performed by: INTERNAL MEDICINE

## 2024-08-14 PROCEDURE — 85025 COMPLETE CBC W/AUTO DIFF WBC: CPT | Performed by: INTERNAL MEDICINE

## 2024-08-14 PROCEDURE — 96549 UNLISTED CHEMOTHERAPY PX: CPT

## 2024-08-14 PROCEDURE — 36591 DRAW BLOOD OFF VENOUS DEVICE: CPT | Performed by: INTERNAL MEDICINE

## 2024-08-14 PROCEDURE — 96367 TX/PROPH/DG ADDL SEQ IV INF: CPT

## 2024-08-14 PROCEDURE — 96413 CHEMO IV INFUSION 1 HR: CPT

## 2024-08-14 PROCEDURE — 96415 CHEMO IV INFUSION ADDL HR: CPT

## 2024-08-14 PROCEDURE — 96375 TX/PRO/DX INJ NEW DRUG ADDON: CPT

## 2024-08-14 PROCEDURE — G0463 HOSPITAL OUTPT CLINIC VISIT: HCPCS | Mod: 25 | Performed by: INTERNAL MEDICINE

## 2024-08-14 RX ORDER — PROCHLORPERAZINE MALEATE 10 MG
10 TABLET ORAL EVERY 6 HOURS PRN
Qty: 30 TABLET | Refills: 2 | Status: SHIPPED | OUTPATIENT
Start: 2024-08-14

## 2024-08-14 RX ORDER — PALONOSETRON 0.05 MG/ML
0.25 INJECTION, SOLUTION INTRAVENOUS ONCE
Status: COMPLETED | OUTPATIENT
Start: 2024-08-14 | End: 2024-08-14

## 2024-08-14 RX ORDER — HEPARIN SODIUM (PORCINE) LOCK FLUSH IV SOLN 100 UNIT/ML 100 UNIT/ML
5 SOLUTION INTRAVENOUS
Status: DISCONTINUED | OUTPATIENT
Start: 2024-08-14 | End: 2024-08-14 | Stop reason: HOSPADM

## 2024-08-14 RX ORDER — ONDANSETRON 8 MG/1
8 TABLET, FILM COATED ORAL EVERY 8 HOURS PRN
Qty: 30 TABLET | Refills: 2 | Status: SHIPPED | OUTPATIENT
Start: 2024-08-14

## 2024-08-14 RX ADMIN — SODIUM CHLORIDE 175 MG: 9 INJECTION, SOLUTION INTRAVENOUS at 11:08

## 2024-08-14 RX ADMIN — Medication 5 ML: at 12:07

## 2024-08-14 RX ADMIN — CARBOPLATIN 350 MG: 10 INJECTION, SOLUTION INTRAVENOUS at 10:35

## 2024-08-14 RX ADMIN — FOSAPREPITANT: 150 INJECTION, POWDER, LYOPHILIZED, FOR SOLUTION INTRAVENOUS at 10:05

## 2024-08-14 RX ADMIN — PALONOSETRON HYDROCHLORIDE 0.25 MG: 0.25 INJECTION INTRAVENOUS at 10:05

## 2024-08-14 RX ADMIN — SODIUM CHLORIDE 250 ML: 9 INJECTION, SOLUTION INTRAVENOUS at 10:05

## 2024-08-14 NOTE — LETTER
"8/14/2024      Pascual Perea  [address]  [facility] [address]      Dear Colleague,    Thank you for referring your patient, Pascual Perea, to the [facility]. Please see a copy of my visit note below.    Oncology Rooming Note    August 14, 2024 8:57 AM   Pascula Perea is a 84 year old male who presents for:    Chief Complaint   Patient presents with     Oncology Clinic Visit     Initial Vitals: /67   Pulse 77   Temp 97.4  F (36.3  C) (Oral)   Resp 18   Wt 93 kg (205 lb)   SpO2 94%   BMI 28.59 kg/m   Estimated body mass index is 28.59 kg/m  as calculated from the following:    Height as of 8/9/24: 1.803 m (5' 11\").    Weight as of this encounter: 93 kg (205 lb). Body surface area is 2.16 meters squared.  Data Unavailable Comment: Data Unavailable   No LMP for male patient.  Allergies reviewed: Yes  Medications reviewed: Yes    Medications: Medication refills not needed today.  Pharmacy name entered into [employer]: [facility] - [place], [place] - [address]    Frailty Screening:   Is the patient here for a new oncology consult visit in cancer care? 2. No        Alpa Berrios [facility]                 ONCOLOGY HISTORY: Mr. 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    1.  CT neck on 06/20/2024 revealed 2.8 cm mass in left parotid gland.  Also revealed multiple enlarged left-sided cervical lymph node.  2.  Ultrasound-guided biopsy of left cervical mass on 06/28/2024 revealed METASTATIC HIGH-GRADE NEUROENDOCRINE CARCINOMA, LARGE CELL TYPE, at least 1.2 cm in linear extent, extending to biopsy margins.  3.  PET scan on 07/19/2024 revealed hypermetabolic left parotid mass and multiple hypermetabolic left cervical lymphadenopathy.  Severely stenotic left internal jugular vein secondary to mass effect.  4. He was seen by ENT at [facility].  Flexible " fiberoptic laryngoscopy on 07/15/2024 was normal.  5. Case discussed at tumor board at [facility].  Tumor board recommendation was to treat it as small cell lung cancer.  Recommendation was to give him 4 cycle of platinum and etoposide and add radiation with cycle 2 or 3.  6. Carboplatin and etoposide started on 08/14/2024 (carboplatin chosen because of CKD, hearing loss and his age).     Subjective: Mr. 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  Plan is to treat it as a small cell lung cancer.  Plan is to give 4 cycle of chemotherapy with carboplatin and etoposide and also give radiation starting with cycle 2 of cycle 3.    Patient is here to start first cycle of chemotherapy.  Parotid mass and mass in the neck is slightly bigger.  Sometimes there is mild pain there.  No dysphagia.  No difficulty breathing.     Review of system:  No headache.  No dizziness.  No chest pain.  No shortness of breath.  No fever or night sweats.  Appetite has been fairly good.  No urinary complaints.  No bowel problem.  No bleeding.  He has mild numbness and tingling in the fingers and toes from diabetes.     Exam:  He is alert and oriented x 3.  Not in any distress.  Vitals: Reviewed.  Vascular system is not examined.     Labs: CBC and CMP done today reviewed.     Assessment:  1.  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  2.  Chronic kidney disease.  Stable.  3.  Chronic hypercalcemia.  Stable.  4.  Sensorineural hearing loss.  5.  Grade 1 peripheral neuropathy.  6.  Normocytic anemia secondary to renal disease and anemia of chronic disease.  7.  Mild leukopenia.  No neutropenia.  8.  Multiple other medical problems including hypertension, hyperlipidemia, hypothyroidism and coronary artery  disease.    Plan:  -Start chemotherapy.  -Radiation oncology appointment.  -CT after cycle 2.  -See me or KOJO with cycle 2.     Discussion:  1.  Patient is overall stable.  Mass is not causing much problem except mild pain.  No dysphagia or shortness of breath.    Patient will be starting chemotherapy with carboplatin and etoposide.  Side effects reviewed.  He will be getting growth factor support with Neulasta.    I am hoping he tolerates it and tumor responds to treatment.    Patient and daughter had few questions which were all answered.  Labs were reviewed.  They were all good to start treatment today.    After cycle 2, we will get CT scan to evaluate the disease.    Plan is to also add radiation with either cycle 2 of cycle 3.  He will be seeing radiation oncologist.    2.  Patient has chronic hypercalcemia. Previous SPEP has been normal. No need for bisphosphonate at this time as it is chronically elevated and he is asymptomatic from it.  Will continue to monitor it.    3.  He will be seen with next cycle.  Advised him to call us with any questions or concerns.    Total visit time of 30 minutes.  Time spent in today's visit, review of chart/investigations today, discussing regarding high risk drugs and documentation today.         Again, thank you for allowing me to participate in the care of your patient.        Sincerely,        Florencia Butler MD

## 2024-08-14 NOTE — PROGRESS NOTES
Take Home Medication Teaching    Patient was educated on the following oral medications: prochlorperazine, ondansetron on August 14, 2024. Teaching provided to patient included indication, dose, administration, adverse effects and side effect management. Written materials were provided and patient was given the opportunity to ask questions. Patient verbalized understanding of the information presented.      Rachel Moniz, PharmD  Hematology/Oncology Pharmacist  [facility]  [phone]    Infusion Nursing Note:  Pascual Perea presents today for C1D1 Labs/Carbo/Etoposide.    Patient seen by provider today: Yes: Dr. Butler   present during visit today: Not Applicable.    Note: Pt's first time infusion. Orientated to infusion center. Educated pt on medication administration and side effects. Prescriptions for Zofran and Compazine sent to pt's preferred pharmacy in [place].      Intravenous Access:  Implanted Port.    Treatment Conditions:  Lab Results   Component Value Date    HGB 10.9 (L) 08/14/2024    WBC 3.7 (L) 08/14/2024    ANEU 2.6 04/13/2020    ANEUTAUTO 2.4 08/14/2024     08/14/2024        Lab Results   Component Value Date     08/14/2024    POTASSIUM 4.3 08/14/2024    MAG 2.0 06/29/2023    CR 1.37 (H) 08/14/2024    STARR 10.6 (H) 08/14/2024    BILITOTAL 0.6 08/14/2024    ALBUMIN 4.2 08/14/2024    ALT 17 08/14/2024    AST 30 08/14/2024       Results reviewed, labs MET treatment parameters, ok to proceed with treatment.      Post Infusion Assessment:  Patient tolerated infusion without incident.  Blood return noted pre and post infusion.  Site patent and intact, free from redness, edema or discomfort.  No evidence of extravasations.  Access discontinued per protocol.       Discharge Plan:   Prescription refills given for Zofran and Compazine.  Discharge instructions reviewed with: Patient and Family.  Patient and/or family verbalized understanding of  discharge instructions and all questions answered.  Copy of AVS reviewed with patient and/or family.  Patient will return 8/15/24 for next appointment.  Patient discharged in stable condition accompanied by: daughter.  Departure Mode: Ambulatory.      Jacquelyn Toribio RN

## 2024-08-14 NOTE — PROGRESS NOTES
"Oncology Rooming Note    August 14, 2024 8:57 AM   Pascual Perea is a 84 year old male who presents for:    Chief Complaint   Patient presents with    Oncology Clinic Visit     Initial Vitals: /67   Pulse 77   Temp 97.4  F (36.3  C) (Oral)   Resp 18   Wt 93 kg (205 lb)   SpO2 94%   BMI 28.59 kg/m   Estimated body mass index is 28.59 kg/m  as calculated from the following:    Height as of 8/9/24: 1.803 m (5' 11\").    Weight as of this encounter: 93 kg (205 lb). Body surface area is 2.16 meters squared.  Data Unavailable Comment: Data Unavailable   No LMP for male patient.  Allergies reviewed: Yes  Medications reviewed: Yes    Medications: Medication refills not needed today.  Pharmacy name entered into [email or web address]: [facility] [place] - [address]    Frailty Screening:   Is the patient here for a new oncology consult visit in cancer care? 2. No        Alpa Berrios CMA            "

## 2024-08-14 NOTE — PATIENT INSTRUCTIONS
-Start chemotherapy.  -Radiation oncology appointment.  -CT after cycle 2.  -See me or KOJO with cycle 2.

## 2024-08-15 ENCOUNTER — INFUSION THERAPY VISIT (OUTPATIENT)
Dept: INFUSION THERAPY | Facility: CLINIC | Age: 84
End: 2024-08-15
Attending: INTERNAL MEDICINE
Payer: COMMERCIAL

## 2024-08-15 VITALS
DIASTOLIC BLOOD PRESSURE: 68 MMHG | RESPIRATION RATE: 18 BRPM | TEMPERATURE: 98.2 F | SYSTOLIC BLOOD PRESSURE: 120 MMHG | HEART RATE: 71 BPM

## 2024-08-15 DIAGNOSIS — T45.1X5A CHEMOTHERAPY-INDUCED NEUTROPENIA (H): ICD-10-CM

## 2024-08-15 DIAGNOSIS — D70.1 CHEMOTHERAPY-INDUCED NEUTROPENIA (H): ICD-10-CM

## 2024-08-15 DIAGNOSIS — C7A.1 HIGH GRADE NEUROENDOCRINE CARCINOMA (H): Primary | ICD-10-CM

## 2024-08-15 DIAGNOSIS — C7A.1 MALIGNANT POORLY DIFFERENTIATED NEUROENDOCRINE CARCINOMA (H): ICD-10-CM

## 2024-08-15 PROCEDURE — 258N000003 HC RX IP 258 OP 636: Performed by: INTERNAL MEDICINE

## 2024-08-15 PROCEDURE — 250N000011 HC RX IP 250 OP 636: Performed by: INTERNAL MEDICINE

## 2024-08-15 PROCEDURE — 96413 CHEMO IV INFUSION 1 HR: CPT

## 2024-08-15 PROCEDURE — 96375 TX/PRO/DX INJ NEW DRUG ADDON: CPT

## 2024-08-15 RX ORDER — HEPARIN SODIUM,PORCINE 10 UNIT/ML
5-20 VIAL (ML) INTRAVENOUS DAILY PRN
Status: DISCONTINUED | OUTPATIENT
Start: 2024-08-15 | End: 2024-08-15 | Stop reason: HOSPADM

## 2024-08-15 RX ADMIN — SODIUM CHLORIDE 250 ML: 9 INJECTION, SOLUTION INTRAVENOUS at 12:19

## 2024-08-15 RX ADMIN — HEPARIN, PORCINE (PF) 10 UNIT/ML INTRAVENOUS SYRINGE 5 ML: at 13:38

## 2024-08-15 RX ADMIN — DEXAMETHASONE SODIUM PHOSPHATE 12 MG: 10 INJECTION, SOLUTION INTRAMUSCULAR; INTRAVENOUS at 12:20

## 2024-08-15 RX ADMIN — SODIUM CHLORIDE 175 MG: 9 INJECTION, SOLUTION INTRAVENOUS at 12:37

## 2024-08-15 NOTE — PROGRESS NOTES
Infusion Nursing Note:  Pascual Perea presents today for C1D2 Etoposide.    Patient seen by provider today: No   present during visit today: Not Applicable.    Note: Patient reports feeling well today; no changes or new concerns noted.      Intravenous Access:  Implanted Port.    Treatment Conditions:  Lab Results   Component Value Date    HGB 10.9 (L) 08/14/2024    WBC 3.7 (L) 08/14/2024    ANEU 2.6 04/13/2020    ANEUTAUTO 2.4 08/14/2024     08/14/2024        Lab Results   Component Value Date     08/14/2024    POTASSIUM 4.3 08/14/2024    MAG 2.0 06/29/2023    CR 1.37 (H) 08/14/2024    STARR 10.6 (H) 08/14/2024    BILITOTAL 0.6 08/14/2024    ALBUMIN 4.2 08/14/2024    ALT 17 08/14/2024    AST 30 08/14/2024       Results reviewed, labs MET treatment parameters, ok to proceed with treatment.      Post Infusion Assessment:  Patient tolerated infusion without incident.  Blood return noted pre and post infusion.  Site patent and intact, free from redness, edema or discomfort.  No evidence of extravasations.  Port flushed and left in place for infusion tomorrow.       Discharge Plan:   Discharge instructions reviewed with: Patient and Family.  Patient and/or family verbalized understanding of discharge instructions and all questions answered.  AVS to patient via [employer].  Patient will return 8/16/24 for next appointment.   Patient discharged in stable condition accompanied by: granddaughter.  Departure Mode: Ambulatory.      Stefan Villela RN

## 2024-08-16 ENCOUNTER — INFUSION THERAPY VISIT (OUTPATIENT)
Dept: INFUSION THERAPY | Facility: CLINIC | Age: 84
End: 2024-08-16
Attending: INTERNAL MEDICINE
Payer: COMMERCIAL

## 2024-08-16 VITALS
TEMPERATURE: 97.8 F | HEART RATE: 68 BPM | OXYGEN SATURATION: 97 % | DIASTOLIC BLOOD PRESSURE: 77 MMHG | SYSTOLIC BLOOD PRESSURE: 127 MMHG | RESPIRATION RATE: 16 BRPM

## 2024-08-16 DIAGNOSIS — C7A.1 HIGH GRADE NEUROENDOCRINE CARCINOMA (H): Primary | ICD-10-CM

## 2024-08-16 DIAGNOSIS — D70.1 CHEMOTHERAPY-INDUCED NEUTROPENIA (H): ICD-10-CM

## 2024-08-16 DIAGNOSIS — E78.5 HYPERLIPIDEMIA LDL GOAL <70: ICD-10-CM

## 2024-08-16 DIAGNOSIS — C7A.1 MALIGNANT POORLY DIFFERENTIATED NEUROENDOCRINE CARCINOMA (H): ICD-10-CM

## 2024-08-16 DIAGNOSIS — T45.1X5A CHEMOTHERAPY-INDUCED NEUTROPENIA (H): ICD-10-CM

## 2024-08-16 PROCEDURE — 96413 CHEMO IV INFUSION 1 HR: CPT

## 2024-08-16 PROCEDURE — 250N000011 HC RX IP 250 OP 636: Performed by: INTERNAL MEDICINE

## 2024-08-16 PROCEDURE — 96372 THER/PROPH/DIAG INJ SC/IM: CPT | Performed by: INTERNAL MEDICINE

## 2024-08-16 PROCEDURE — 258N000003 HC RX IP 258 OP 636: Performed by: INTERNAL MEDICINE

## 2024-08-16 PROCEDURE — 96377 APPLICATON ON-BODY INJECTOR: CPT | Mod: XS

## 2024-08-16 PROCEDURE — 96367 TX/PROPH/DG ADDL SEQ IV INF: CPT

## 2024-08-16 RX ORDER — HEPARIN SODIUM (PORCINE) LOCK FLUSH IV SOLN 100 UNIT/ML 100 UNIT/ML
5 SOLUTION INTRAVENOUS
Status: DISCONTINUED | OUTPATIENT
Start: 2024-08-16 | End: 2024-08-16 | Stop reason: HOSPADM

## 2024-08-16 RX ADMIN — DEXAMETHASONE SODIUM PHOSPHATE 12 MG: 10 INJECTION, SOLUTION INTRAMUSCULAR; INTRAVENOUS at 08:31

## 2024-08-16 RX ADMIN — SODIUM CHLORIDE 250 ML: 9 INJECTION, SOLUTION INTRAVENOUS at 08:31

## 2024-08-16 RX ADMIN — SODIUM CHLORIDE 175 MG: 9 INJECTION, SOLUTION INTRAVENOUS at 08:52

## 2024-08-16 RX ADMIN — Medication 5 ML: at 09:54

## 2024-08-16 RX ADMIN — PEGFILGRASTIM 6 MG: KIT SUBCUTANEOUS at 10:16

## 2024-08-16 NOTE — PROGRESS NOTES
Infusion Nursing Note:  Pascual Perea presents today for C3D1 Etoposide+Neulasta OnPro.    Patient seen by provider today: No   present during visit today: Not Applicable.    Note: Pt reports no new health changes or concerns today. Neulasta OnPro also applied to pt's, per springboard orders.       Intravenous Access:  Implanted Port.    Treatment Conditions:  Lab Results   Component Value Date    HGB 10.9 (L) 08/14/2024    WBC 3.7 (L) 08/14/2024    ANEU 2.6 04/13/2020    ANEUTAUTO 2.4 08/14/2024     08/14/2024        Lab Results   Component Value Date     08/14/2024    POTASSIUM 4.3 08/14/2024    MAG 2.0 06/29/2023    CR 1.37 (H) 08/14/2024    STARR 10.6 (H) 08/14/2024    BILITOTAL 0.6 08/14/2024    ALBUMIN 4.2 08/14/2024    ALT 17 08/14/2024    AST 30 08/14/2024       Results reviewed, labs MET treatment parameters, ok to proceed with treatment.      Post Infusion Assessment:  Patient tolerated infusion without incident.  Blood return noted pre and post infusion.  Site patent and intact, free from redness, edema or discomfort.  No evidence of extravasations.  Access discontinued per protocol.     ONPRO  Was placed on patient's: right side of abdomen.    Was placed at 10:25 AM    Podpal used: Yes    ONPRO injector device Lot number: [id number]    Patient education included: what patient can expect after application, what colored lights mean on the device, when to remove device, when and where to call with questions or issues, all patients questions answered, and that Neulasta administration will occur at 1:25 PM on 8/17/24.    Patient tolerated administration well.     Discharge Plan:   Discharge instructions reviewed with: Patient.  Patient and/or family verbalized understanding of discharge instructions and all questions answered.  AVS to patient via [employer].  Patient will return 9/4/24 for next appointment.   Patient discharged in stable condition accompanied by: self.  Departure Mode:  Ambulatory.      Kelly Weeks RN

## 2024-08-21 ENCOUNTER — TRANSFERRED RECORDS (OUTPATIENT)
Dept: HEALTH INFORMATION MANAGEMENT | Facility: CLINIC | Age: 84
End: 2024-08-21
Payer: COMMERCIAL

## 2024-08-22 ENCOUNTER — PATIENT OUTREACH (OUTPATIENT)
Dept: ONCOLOGY | Facility: CLINIC | Age: 84
End: 2024-08-22
Payer: COMMERCIAL

## 2024-08-22 NOTE — PROGRESS NOTES
Writer received a call from Pepe WATKINS that patient will be seen in [place] [facility] Dr. Montemayor.  Anticipate to start with cycle 2 on 9/4. Both patient and [facility] wanting clarification what Dr. Butler would like for start date and when to have CT scan.    Patient also has  dental appointment and may require extensive work. Will wait to to be update on what dental work may need to be done after initial appt.     A message sent to Dr. Butler to clarify and advise.     Brigitte Powell RN

## 2024-08-25 RX ORDER — EPINEPHRINE 1 MG/ML
0.3 INJECTION, SOLUTION INTRAMUSCULAR; SUBCUTANEOUS EVERY 5 MIN PRN
Status: CANCELLED | OUTPATIENT
Start: 2024-09-04

## 2024-08-25 RX ORDER — MEPERIDINE HYDROCHLORIDE 25 MG/ML
25 INJECTION INTRAMUSCULAR; INTRAVENOUS; SUBCUTANEOUS EVERY 30 MIN PRN
Status: CANCELLED | OUTPATIENT
Start: 2024-09-05

## 2024-08-25 RX ORDER — DIPHENHYDRAMINE HYDROCHLORIDE 50 MG/ML
50 INJECTION INTRAMUSCULAR; INTRAVENOUS
Status: CANCELLED
Start: 2024-09-05

## 2024-08-25 RX ORDER — HEPARIN SODIUM (PORCINE) LOCK FLUSH IV SOLN 100 UNIT/ML 100 UNIT/ML
5 SOLUTION INTRAVENOUS
Status: CANCELLED | OUTPATIENT
Start: 2024-09-05

## 2024-08-25 RX ORDER — ALBUTEROL SULFATE 0.83 MG/ML
2.5 SOLUTION RESPIRATORY (INHALATION)
Status: CANCELLED | OUTPATIENT
Start: 2024-09-04

## 2024-08-25 RX ORDER — LORAZEPAM 2 MG/ML
0.5 INJECTION INTRAMUSCULAR EVERY 4 HOURS PRN
Status: CANCELLED | OUTPATIENT
Start: 2024-09-06

## 2024-08-25 RX ORDER — ALBUTEROL SULFATE 90 UG/1
1-2 AEROSOL, METERED RESPIRATORY (INHALATION)
Status: CANCELLED
Start: 2024-09-04

## 2024-08-25 RX ORDER — METHYLPREDNISOLONE SODIUM SUCCINATE 125 MG/2ML
125 INJECTION, POWDER, LYOPHILIZED, FOR SOLUTION INTRAMUSCULAR; INTRAVENOUS
Status: CANCELLED
Start: 2024-09-06

## 2024-08-25 RX ORDER — HEPARIN SODIUM,PORCINE 10 UNIT/ML
5-20 VIAL (ML) INTRAVENOUS DAILY PRN
Status: CANCELLED | OUTPATIENT
Start: 2024-09-04

## 2024-08-25 RX ORDER — MEPERIDINE HYDROCHLORIDE 25 MG/ML
25 INJECTION INTRAMUSCULAR; INTRAVENOUS; SUBCUTANEOUS EVERY 30 MIN PRN
Status: CANCELLED | OUTPATIENT
Start: 2024-09-06

## 2024-08-25 RX ORDER — HEPARIN SODIUM (PORCINE) LOCK FLUSH IV SOLN 100 UNIT/ML 100 UNIT/ML
5 SOLUTION INTRAVENOUS
Status: CANCELLED | OUTPATIENT
Start: 2024-09-04

## 2024-08-25 RX ORDER — METHYLPREDNISOLONE SODIUM SUCCINATE 125 MG/2ML
125 INJECTION, POWDER, LYOPHILIZED, FOR SOLUTION INTRAMUSCULAR; INTRAVENOUS
Status: CANCELLED
Start: 2024-09-04

## 2024-08-25 RX ORDER — EPINEPHRINE 1 MG/ML
0.3 INJECTION, SOLUTION INTRAMUSCULAR; SUBCUTANEOUS EVERY 5 MIN PRN
Status: CANCELLED | OUTPATIENT
Start: 2024-09-06

## 2024-08-25 RX ORDER — DIPHENHYDRAMINE HYDROCHLORIDE 50 MG/ML
50 INJECTION INTRAMUSCULAR; INTRAVENOUS
Status: CANCELLED
Start: 2024-09-06

## 2024-08-25 RX ORDER — LORAZEPAM 2 MG/ML
0.5 INJECTION INTRAMUSCULAR EVERY 4 HOURS PRN
Status: CANCELLED | OUTPATIENT
Start: 2024-09-05

## 2024-08-25 RX ORDER — DIPHENHYDRAMINE HYDROCHLORIDE 50 MG/ML
50 INJECTION INTRAMUSCULAR; INTRAVENOUS
Status: CANCELLED
Start: 2024-09-04

## 2024-08-25 RX ORDER — ALBUTEROL SULFATE 0.83 MG/ML
2.5 SOLUTION RESPIRATORY (INHALATION)
Status: CANCELLED | OUTPATIENT
Start: 2024-09-06

## 2024-08-25 RX ORDER — HEPARIN SODIUM (PORCINE) LOCK FLUSH IV SOLN 100 UNIT/ML 100 UNIT/ML
5 SOLUTION INTRAVENOUS
Status: CANCELLED | OUTPATIENT
Start: 2024-09-06

## 2024-08-25 RX ORDER — HEPARIN SODIUM,PORCINE 10 UNIT/ML
5-20 VIAL (ML) INTRAVENOUS DAILY PRN
Status: CANCELLED | OUTPATIENT
Start: 2024-09-05

## 2024-08-25 RX ORDER — HEPARIN SODIUM,PORCINE 10 UNIT/ML
5-20 VIAL (ML) INTRAVENOUS DAILY PRN
Status: CANCELLED | OUTPATIENT
Start: 2024-09-06

## 2024-08-25 RX ORDER — PALONOSETRON 0.05 MG/ML
0.25 INJECTION, SOLUTION INTRAVENOUS ONCE
Status: CANCELLED | OUTPATIENT
Start: 2024-09-04

## 2024-08-25 RX ORDER — MEPERIDINE HYDROCHLORIDE 25 MG/ML
25 INJECTION INTRAMUSCULAR; INTRAVENOUS; SUBCUTANEOUS EVERY 30 MIN PRN
Status: CANCELLED | OUTPATIENT
Start: 2024-09-04

## 2024-08-25 RX ORDER — LORAZEPAM 2 MG/ML
0.5 INJECTION INTRAMUSCULAR EVERY 4 HOURS PRN
Status: CANCELLED | OUTPATIENT
Start: 2024-09-04

## 2024-08-25 RX ORDER — METHYLPREDNISOLONE SODIUM SUCCINATE 125 MG/2ML
125 INJECTION, POWDER, LYOPHILIZED, FOR SOLUTION INTRAMUSCULAR; INTRAVENOUS
Status: CANCELLED
Start: 2024-09-05

## 2024-08-25 RX ORDER — ALBUTEROL SULFATE 90 UG/1
1-2 AEROSOL, METERED RESPIRATORY (INHALATION)
Status: CANCELLED
Start: 2024-09-05

## 2024-08-25 RX ORDER — ALBUTEROL SULFATE 0.83 MG/ML
2.5 SOLUTION RESPIRATORY (INHALATION)
Status: CANCELLED | OUTPATIENT
Start: 2024-09-05

## 2024-08-25 RX ORDER — EPINEPHRINE 1 MG/ML
0.3 INJECTION, SOLUTION INTRAMUSCULAR; SUBCUTANEOUS EVERY 5 MIN PRN
Status: CANCELLED | OUTPATIENT
Start: 2024-09-05

## 2024-08-25 RX ORDER — ALBUTEROL SULFATE 90 UG/1
1-2 AEROSOL, METERED RESPIRATORY (INHALATION)
Status: CANCELLED
Start: 2024-09-06

## 2024-08-25 NOTE — PROGRESS NOTES
ONCOLOGY HISTORY: Mr. 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    1.  CT neck on 06/20/2024 revealed 2.8 cm mass in left parotid gland.  Also revealed multiple enlarged left-sided cervical lymph node.  2.  Ultrasound-guided biopsy of left cervical mass on 06/28/2024 revealed METASTATIC HIGH-GRADE NEUROENDOCRINE CARCINOMA, LARGE CELL TYPE, at least 1.2 cm in linear extent, extending to biopsy margins.  3.  PET scan on 07/19/2024 revealed hypermetabolic left parotid mass and multiple hypermetabolic left cervical lymphadenopathy.  Severely stenotic left internal jugular vein secondary to mass effect.  4. He was seen by ENT at [facility].  Flexible fiberoptic laryngoscopy on 07/15/2024 was normal.  5. Case discussed at tumor board at [facility].  Tumor board recommendation was to treat it as small cell lung cancer.  Recommendation was to give him 4 cycle of platinum and etoposide and add radiation with cycle 2 or 3.  6. Carboplatin and etoposide started on 08/14/2024 (carboplatin chosen because of CKD, hearing loss and his age).     Subjective: Mr. 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  Plan is to treat it as a small cell lung cancer.  Plan is to give 4 cycle of chemotherapy with carboplatin and etoposide and also give radiation starting with cycle 2 of cycle 3.    Patient is here to start first cycle of chemotherapy.  Parotid mass and mass in the neck is slightly bigger.  Sometimes there is mild pain there.  No dysphagia.  No difficulty breathing.     Review of system:  No headache.  No dizziness.  No chest pain.  No shortness of breath.  No fever or night sweats.  Appetite has been fairly good.  No urinary complaints.  No bowel problem.  No bleeding.  He has mild numbness  and tingling in the fingers and toes from diabetes.     Exam:  He is alert and oriented x 3.  Not in any distress.  Vitals: Reviewed.  Vascular system is not examined.     Labs: CBC and CMP done today reviewed.     Assessment:  1.  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  2.  Chronic kidney disease.  Stable.  3.  Chronic hypercalcemia.  Stable.  4.  Sensorineural hearing loss.  5.  Grade 1 peripheral neuropathy.  6.  Normocytic anemia secondary to renal disease and anemia of chronic disease.  7.  Mild leukopenia.  No neutropenia.  8.  Multiple other medical problems including hypertension, hyperlipidemia, hypothyroidism and coronary artery disease.    Plan:  -Start chemotherapy.  -Radiation oncology appointment.  -CT after cycle 2.  -See me or KOJO with cycle 2.     Discussion:  1.  Patient is overall stable.  Mass is not causing much problem except mild pain.  No dysphagia or shortness of breath.    Patient will be starting chemotherapy with carboplatin and etoposide.  Side effects reviewed.  He will be getting growth factor support with Neulasta.    I am hoping he tolerates it and tumor responds to treatment.    Patient and daughter had few questions which were all answered.  Labs were reviewed.  They were all good to start treatment today.    After cycle 2, we will get CT scan to evaluate the disease.    Plan is to also add radiation with either cycle 2 of cycle 3.  He will be seeing radiation oncologist.    2.  Patient has chronic hypercalcemia. Previous SPEP has been normal. No need for bisphosphonate at this time as it is chronically elevated and he is asymptomatic from it.  Will continue to monitor it.    3.  He will be seen with next cycle.  Advised him to call us with any questions or concerns.    Total visit time of 30 minutes.  Time spent in today's visit, review of chart/investigations today, discussing  regarding high risk drugs and documentation today.

## 2024-09-04 ENCOUNTER — INFUSION THERAPY VISIT (OUTPATIENT)
Dept: INFUSION THERAPY | Facility: CLINIC | Age: 84
End: 2024-09-04
Attending: INTERNAL MEDICINE
Payer: COMMERCIAL

## 2024-09-04 VITALS
TEMPERATURE: 97.9 F | SYSTOLIC BLOOD PRESSURE: 105 MMHG | DIASTOLIC BLOOD PRESSURE: 53 MMHG | RESPIRATION RATE: 18 BRPM | WEIGHT: 208.4 LBS | BODY MASS INDEX: 29.07 KG/M2 | OXYGEN SATURATION: 96 % | HEART RATE: 73 BPM

## 2024-09-04 DIAGNOSIS — C7A.1 HIGH GRADE NEUROENDOCRINE CARCINOMA (H): Primary | ICD-10-CM

## 2024-09-04 DIAGNOSIS — Z79.4 TYPE 2 DIABETES MELLITUS WITHOUT COMPLICATION, WITH LONG-TERM CURRENT USE OF INSULIN (H): ICD-10-CM

## 2024-09-04 DIAGNOSIS — T45.1X5A CHEMOTHERAPY-INDUCED NEUTROPENIA (H): ICD-10-CM

## 2024-09-04 DIAGNOSIS — I48.91 ATRIAL FIBRILLATION, UNSPECIFIED TYPE (H): ICD-10-CM

## 2024-09-04 DIAGNOSIS — E11.9 TYPE 2 DIABETES MELLITUS WITHOUT COMPLICATION, WITH LONG-TERM CURRENT USE OF INSULIN (H): ICD-10-CM

## 2024-09-04 DIAGNOSIS — C7A.1 MALIGNANT POORLY DIFFERENTIATED NEUROENDOCRINE CARCINOMA (H): ICD-10-CM

## 2024-09-04 DIAGNOSIS — D70.1 CHEMOTHERAPY-INDUCED NEUTROPENIA (H): ICD-10-CM

## 2024-09-04 LAB
ALBUMIN SERPL BCG-MCNC: 4.1 G/DL (ref 3.5–5.2)
ALP SERPL-CCNC: 74 U/L (ref 40–150)
ALT SERPL W P-5'-P-CCNC: 25 U/L (ref 0–70)
ANION GAP SERPL CALCULATED.3IONS-SCNC: 11 MMOL/L (ref 7–15)
AST SERPL W P-5'-P-CCNC: 20 U/L (ref 0–45)
BASOPHILS # BLD AUTO: 0.1 10E3/UL (ref 0–0.2)
BASOPHILS NFR BLD AUTO: 2 %
BILIRUB SERPL-MCNC: 0.5 MG/DL
BUN SERPL-MCNC: 15.6 MG/DL (ref 8–23)
CALCIUM SERPL-MCNC: 10.1 MG/DL (ref 8.8–10.4)
CHLORIDE SERPL-SCNC: 106 MMOL/L (ref 98–107)
CREAT SERPL-MCNC: 1.38 MG/DL (ref 0.67–1.17)
EGFRCR SERPLBLD CKD-EPI 2021: 51 ML/MIN/1.73M2
EOSINOPHIL # BLD AUTO: 0 10E3/UL (ref 0–0.7)
EOSINOPHIL NFR BLD AUTO: 1 %
ERYTHROCYTE [DISTWIDTH] IN BLOOD BY AUTOMATED COUNT: 17.3 % (ref 10–15)
GLUCOSE SERPL-MCNC: 160 MG/DL (ref 70–99)
HCO3 SERPL-SCNC: 24 MMOL/L (ref 22–29)
HCT VFR BLD AUTO: 28 % (ref 40–53)
HGB BLD-MCNC: 9 G/DL (ref 13.3–17.7)
IMM GRANULOCYTES # BLD: 0.2 10E3/UL
IMM GRANULOCYTES NFR BLD: 2 %
LYMPHOCYTES # BLD AUTO: 1 10E3/UL (ref 0.8–5.3)
LYMPHOCYTES NFR BLD AUTO: 15 %
MCH RBC QN AUTO: 28.8 PG (ref 26.5–33)
MCHC RBC AUTO-ENTMCNC: 32.1 G/DL (ref 31.5–36.5)
MCV RBC AUTO: 90 FL (ref 78–100)
MONOCYTES # BLD AUTO: 0.6 10E3/UL (ref 0–1.3)
MONOCYTES NFR BLD AUTO: 9 %
NEUTROPHILS # BLD AUTO: 4.8 10E3/UL (ref 1.6–8.3)
NEUTROPHILS NFR BLD AUTO: 72 %
NRBC # BLD AUTO: 0 10E3/UL
NRBC BLD AUTO-RTO: 1 /100
PLATELET # BLD AUTO: 245 10E3/UL (ref 150–450)
POTASSIUM SERPL-SCNC: 4.1 MMOL/L (ref 3.4–5.3)
PROT SERPL-MCNC: 6.4 G/DL (ref 6.4–8.3)
RBC # BLD AUTO: 3.12 10E6/UL (ref 4.4–5.9)
SODIUM SERPL-SCNC: 141 MMOL/L (ref 135–145)
WBC # BLD AUTO: 6.6 10E3/UL (ref 4–11)

## 2024-09-04 PROCEDURE — 258N000003 HC RX IP 258 OP 636: Performed by: INTERNAL MEDICINE

## 2024-09-04 PROCEDURE — 96375 TX/PRO/DX INJ NEW DRUG ADDON: CPT

## 2024-09-04 PROCEDURE — 250N000011 HC RX IP 250 OP 636: Performed by: INTERNAL MEDICINE

## 2024-09-04 PROCEDURE — 96413 CHEMO IV INFUSION 1 HR: CPT

## 2024-09-04 PROCEDURE — 36591 DRAW BLOOD OFF VENOUS DEVICE: CPT | Performed by: INTERNAL MEDICINE

## 2024-09-04 PROCEDURE — 82040 ASSAY OF SERUM ALBUMIN: CPT | Performed by: INTERNAL MEDICINE

## 2024-09-04 PROCEDURE — 96367 TX/PROPH/DG ADDL SEQ IV INF: CPT

## 2024-09-04 PROCEDURE — 96417 CHEMO IV INFUS EACH ADDL SEQ: CPT

## 2024-09-04 PROCEDURE — 85025 COMPLETE CBC W/AUTO DIFF WBC: CPT | Performed by: INTERNAL MEDICINE

## 2024-09-04 RX ORDER — PALONOSETRON 0.05 MG/ML
0.25 INJECTION, SOLUTION INTRAVENOUS ONCE
Status: COMPLETED | OUTPATIENT
Start: 2024-09-04 | End: 2024-09-04

## 2024-09-04 RX ORDER — HEPARIN SODIUM,PORCINE 10 UNIT/ML
5-20 VIAL (ML) INTRAVENOUS DAILY PRN
Status: DISCONTINUED | OUTPATIENT
Start: 2024-09-04 | End: 2024-09-04 | Stop reason: HOSPADM

## 2024-09-04 RX ORDER — HEPARIN SODIUM (PORCINE) LOCK FLUSH IV SOLN 100 UNIT/ML 100 UNIT/ML
5 SOLUTION INTRAVENOUS
Status: DISCONTINUED | OUTPATIENT
Start: 2024-09-04 | End: 2024-09-04 | Stop reason: HOSPADM

## 2024-09-04 RX ORDER — BLOOD SUGAR DIAGNOSTIC
STRIP MISCELLANEOUS
Qty: 100 STRIP | Refills: 2 | Status: SHIPPED | OUTPATIENT
Start: 2024-09-04

## 2024-09-04 RX ADMIN — FOSAPREPITANT: 150 INJECTION, POWDER, LYOPHILIZED, FOR SOLUTION INTRAVENOUS at 09:24

## 2024-09-04 RX ADMIN — HEPARIN, PORCINE (PF) 10 UNIT/ML INTRAVENOUS SYRINGE 5 ML: at 11:19

## 2024-09-04 RX ADMIN — SODIUM CHLORIDE 170 MG: 9 INJECTION, SOLUTION INTRAVENOUS at 10:18

## 2024-09-04 RX ADMIN — CARBOPLATIN 350 MG: 10 INJECTION, SOLUTION INTRAVENOUS at 09:47

## 2024-09-04 RX ADMIN — SODIUM CHLORIDE 250 ML: 9 INJECTION, SOLUTION INTRAVENOUS at 09:18

## 2024-09-04 RX ADMIN — PALONOSETRON HYDROCHLORIDE 0.25 MG: 0.25 INJECTION INTRAVENOUS at 09:24

## 2024-09-04 NOTE — PROGRESS NOTES
Infusion Nursing Note:  Pascual Perea presents today for C2D1 Carbo/Etoposide.    Patient seen by provider today: No   present during visit today: Not Applicable.    Note: Port Needle left accessed for tomorrow's infusion.      Intravenous Access:  Labs drawn without difficulty.  Implanted Port.    Treatment Conditions:  Lab Results   Component Value Date    HGB 9.0 (L) 09/04/2024    WBC 6.6 09/04/2024    ANEU 2.6 04/13/2020    ANEUTAUTO 4.8 09/04/2024     09/04/2024        Lab Results   Component Value Date     09/04/2024    POTASSIUM 4.1 09/04/2024    MAG 2.0 06/29/2023    CR 1.38 (H) 09/04/2024    STARR 10.1 09/04/2024    BILITOTAL 0.5 09/04/2024    ALBUMIN 4.1 09/04/2024    ALT 25 09/04/2024    AST 20 09/04/2024       Results reviewed, labs MET treatment parameters, ok to proceed with treatment.      Post Infusion Assessment:  Patient tolerated infusion without incident.  Blood return noted pre and post infusion.  Site patent and intact, free from redness, edema or discomfort.  No evidence of extravasations.       Discharge Plan:   Patient declined prescription refills.  Discharge instructions reviewed with: Patient.  Patient and/or family verbalized understanding of discharge instructions and all questions answered.  AVS to patient via [employer].  Patient will return 9/5 for next appointment.   Patient discharged in stable condition accompanied by: self and son.  Departure Mode: Ambulatory.      Alvaro Lugo RN

## 2024-09-05 ENCOUNTER — INFUSION THERAPY VISIT (OUTPATIENT)
Dept: INFUSION THERAPY | Facility: CLINIC | Age: 84
End: 2024-09-05
Attending: INTERNAL MEDICINE
Payer: COMMERCIAL

## 2024-09-05 VITALS
BODY MASS INDEX: 29.73 KG/M2 | WEIGHT: 212.4 LBS | DIASTOLIC BLOOD PRESSURE: 72 MMHG | SYSTOLIC BLOOD PRESSURE: 146 MMHG | RESPIRATION RATE: 16 BRPM | HEIGHT: 71 IN | TEMPERATURE: 97.7 F | OXYGEN SATURATION: 96 % | HEART RATE: 80 BPM

## 2024-09-05 DIAGNOSIS — C7A.1 HIGH GRADE NEUROENDOCRINE CARCINOMA (H): Primary | ICD-10-CM

## 2024-09-05 DIAGNOSIS — C7A.1 MALIGNANT POORLY DIFFERENTIATED NEUROENDOCRINE CARCINOMA (H): ICD-10-CM

## 2024-09-05 DIAGNOSIS — T45.1X5A CHEMOTHERAPY-INDUCED NEUTROPENIA (H): ICD-10-CM

## 2024-09-05 DIAGNOSIS — D70.1 CHEMOTHERAPY-INDUCED NEUTROPENIA (H): ICD-10-CM

## 2024-09-05 PROCEDURE — 96413 CHEMO IV INFUSION 1 HR: CPT

## 2024-09-05 PROCEDURE — 258N000003 HC RX IP 258 OP 636: Performed by: INTERNAL MEDICINE

## 2024-09-05 PROCEDURE — 96367 TX/PROPH/DG ADDL SEQ IV INF: CPT

## 2024-09-05 PROCEDURE — 250N000011 HC RX IP 250 OP 636: Performed by: INTERNAL MEDICINE

## 2024-09-05 RX ORDER — HEPARIN SODIUM,PORCINE 10 UNIT/ML
5-20 VIAL (ML) INTRAVENOUS DAILY PRN
Status: DISCONTINUED | OUTPATIENT
Start: 2024-09-05 | End: 2024-09-05 | Stop reason: HOSPADM

## 2024-09-05 RX ADMIN — HEPARIN, PORCINE (PF) 10 UNIT/ML INTRAVENOUS SYRINGE 5 ML: at 14:18

## 2024-09-05 RX ADMIN — SODIUM CHLORIDE 170 MG: 9 INJECTION, SOLUTION INTRAVENOUS at 13:09

## 2024-09-05 RX ADMIN — DEXAMETHASONE SODIUM PHOSPHATE 12 MG: 10 INJECTION, SOLUTION INTRAMUSCULAR; INTRAVENOUS at 12:48

## 2024-09-05 RX ADMIN — SODIUM CHLORIDE 250 ML: 9 INJECTION, SOLUTION INTRAVENOUS at 12:48

## 2024-09-06 ENCOUNTER — TELEPHONE (OUTPATIENT)
Dept: INTERNAL MEDICINE | Facility: CLINIC | Age: 84
End: 2024-09-06

## 2024-09-06 ENCOUNTER — INFUSION THERAPY VISIT (OUTPATIENT)
Dept: INFUSION THERAPY | Facility: CLINIC | Age: 84
End: 2024-09-06
Attending: INTERNAL MEDICINE
Payer: COMMERCIAL

## 2024-09-06 VITALS
OXYGEN SATURATION: 97 % | DIASTOLIC BLOOD PRESSURE: 77 MMHG | HEART RATE: 78 BPM | SYSTOLIC BLOOD PRESSURE: 150 MMHG | RESPIRATION RATE: 18 BRPM | TEMPERATURE: 97.7 F

## 2024-09-06 DIAGNOSIS — C7A.1 MALIGNANT POORLY DIFFERENTIATED NEUROENDOCRINE CARCINOMA (H): ICD-10-CM

## 2024-09-06 DIAGNOSIS — T45.1X5A CHEMOTHERAPY-INDUCED NEUTROPENIA (H): ICD-10-CM

## 2024-09-06 DIAGNOSIS — C7A.1 HIGH GRADE NEUROENDOCRINE CARCINOMA (H): Primary | ICD-10-CM

## 2024-09-06 DIAGNOSIS — D70.1 CHEMOTHERAPY-INDUCED NEUTROPENIA (H): ICD-10-CM

## 2024-09-06 PROCEDURE — 258N000003 HC RX IP 258 OP 636: Performed by: INTERNAL MEDICINE

## 2024-09-06 PROCEDURE — 96367 TX/PROPH/DG ADDL SEQ IV INF: CPT

## 2024-09-06 PROCEDURE — 250N000011 HC RX IP 250 OP 636: Performed by: INTERNAL MEDICINE

## 2024-09-06 PROCEDURE — 96372 THER/PROPH/DIAG INJ SC/IM: CPT | Performed by: INTERNAL MEDICINE

## 2024-09-06 PROCEDURE — 96413 CHEMO IV INFUSION 1 HR: CPT

## 2024-09-06 PROCEDURE — 96377 APPLICATON ON-BODY INJECTOR: CPT | Mod: 59 | Performed by: INTERNAL MEDICINE

## 2024-09-06 RX ORDER — HEPARIN SODIUM (PORCINE) LOCK FLUSH IV SOLN 100 UNIT/ML 100 UNIT/ML
5 SOLUTION INTRAVENOUS
Status: DISCONTINUED | OUTPATIENT
Start: 2024-09-06 | End: 2024-09-06 | Stop reason: HOSPADM

## 2024-09-06 RX ADMIN — PEGFILGRASTIM 6 MG: KIT SUBCUTANEOUS at 12:39

## 2024-09-06 RX ADMIN — SODIUM CHLORIDE 250 ML: 9 INJECTION, SOLUTION INTRAVENOUS at 12:04

## 2024-09-06 RX ADMIN — SODIUM CHLORIDE 170 MG: 9 INJECTION, SOLUTION INTRAVENOUS at 12:26

## 2024-09-06 RX ADMIN — Medication 5 ML: at 13:27

## 2024-09-06 RX ADMIN — DEXAMETHASONE SODIUM PHOSPHATE 12 MG: 10 INJECTION, SOLUTION INTRAMUSCULAR; INTRAVENOUS at 12:06

## 2024-09-06 NOTE — TELEPHONE ENCOUNTER
Reason for Call:  Form, our goal is to have forms completed with 72 hours, however, some forms may require a visit or additional information.    Type of letter, form or note:  medical    Who is the form from?: Patient    Where did the form come from: Patient or family brought in       What clinic location was the form placed at?: Internal Medicine    Where the form was placed: Given to MA/RN    What number is listed as a contact on the form?: [phone]       Additional comments: none    Call taken on 9/6/2024 at 11:28 AM by Josephine Alfaro MA

## 2024-09-06 NOTE — PROGRESS NOTES
Infusion Nursing Note:  Pascual ALEXANDRA Eliazar presents today for C2D3 Etoposide.    Patient seen by provider today: No   present during visit today: Not Applicable.    Note:     ONPRO  Was placed on patient's: right side of abdomen.    Was placed at 12:45 PM    Podpal used: Yes    ONPRO injector device Lot number: [id number]    Patient education included: what patient can expect after application, what colored lights mean on the device, when to remove device, when and where to call with questions or issues, all patients questions answered, and that Neulasta administration will occur at 3:45 PM on 9/7.    Patient tolerated administration well.    Intravenous Access:  Implanted Port.    Treatment Conditions:  Not Applicable.      Post Infusion Assessment:  Patient tolerated infusion without incident.  Blood return noted pre and post infusion.  Site patent and intact, free from redness, edema or discomfort.  No evidence of extravasations.  Access discontinued per protocol.       Discharge Plan:   Patient declined prescription refills.  Discharge instructions reviewed with: Patient.  Patient and/or family verbalized understanding of discharge instructions and all questions answered.  AVS to patient via [employer].   Patient discharged in stable condition accompanied by: self.  Departure Mode: Ambulatory.      Alvaro Lugo RN

## 2024-09-06 NOTE — PATIENT INSTRUCTIONS
Your On-body Neulasta Injector was applied to your right abdomen at 12:45 PM.  At approximately 3:45 PM on 9/7, your On-body Injector will beep to let you know your dose delivery will begin in 2 minutes.  Your medication will be delivered over the next 45 minutes.  You can remove your Injector at 4:45 PM on 9/8.  Please make sure your Injector has a solid green light or has turned off prior to removing the device.  Please contact your provider at [phone] with questions or concerns.

## 2024-09-08 NOTE — TELEPHONE ENCOUNTER
Spoke with pt. Has SOB with pulm HTN limited walking to  < 200ft before stopping.  Qualifies for form. Completed and in southside basket. Leave at   for pt to PU

## 2024-09-11 ENCOUNTER — TELEPHONE (OUTPATIENT)
Dept: MEDSURG UNIT | Facility: CLINIC | Age: 84
End: 2024-09-11
Payer: COMMERCIAL

## 2024-09-11 ENCOUNTER — MYC MEDICAL ADVICE (OUTPATIENT)
Dept: INTERNAL MEDICINE | Facility: CLINIC | Age: 84
End: 2024-09-11

## 2024-09-11 RX ORDER — HEPARIN SODIUM,PORCINE 10 UNIT/ML
5-10 VIAL (ML) INTRAVENOUS
OUTPATIENT
Start: 2024-09-11

## 2024-09-11 RX ORDER — HEPARIN SODIUM,PORCINE 10 UNIT/ML
5-10 VIAL (ML) INTRAVENOUS EVERY 24 HOURS
OUTPATIENT
Start: 2024-09-11

## 2024-09-11 RX ORDER — HEPARIN SODIUM (PORCINE) LOCK FLUSH IV SOLN 100 UNIT/ML 100 UNIT/ML
5-10 SOLUTION INTRAVENOUS
OUTPATIENT
Start: 2024-09-11

## 2024-09-12 ENCOUNTER — HOSPITAL ENCOUNTER (OUTPATIENT)
Dept: CT IMAGING | Facility: CLINIC | Age: 84
Discharge: HOME | End: 2024-09-12
Attending: INTERNAL MEDICINE | Admitting: INTERNAL MEDICINE
Payer: COMMERCIAL

## 2024-09-12 ENCOUNTER — HOSPITAL ENCOUNTER (OUTPATIENT)
Facility: CLINIC | Age: 84
Discharge: HOME | End: 2024-09-12
Admitting: INTERNAL MEDICINE
Payer: COMMERCIAL

## 2024-09-12 DIAGNOSIS — C7A.1 MALIGNANT POORLY DIFFERENTIATED NEUROENDOCRINE CARCINOMA (H): ICD-10-CM

## 2024-09-12 PROCEDURE — 70491 CT SOFT TISSUE NECK W/DYE: CPT

## 2024-09-12 PROCEDURE — 71260 CT THORAX DX C+: CPT

## 2024-09-12 PROCEDURE — 250N000011 HC RX IP 250 OP 636: Performed by: INTERNAL MEDICINE

## 2024-09-12 PROCEDURE — 250N000009 HC RX 250: Performed by: INTERNAL MEDICINE

## 2024-09-12 RX ORDER — IOPAMIDOL 755 MG/ML
125 INJECTION, SOLUTION INTRAVASCULAR ONCE
Status: COMPLETED | OUTPATIENT
Start: 2024-09-12 | End: 2024-09-12

## 2024-09-12 RX ORDER — IOPAMIDOL 755 MG/ML
114 INJECTION, SOLUTION INTRAVASCULAR ONCE
Status: DISCONTINUED | OUTPATIENT
Start: 2024-09-12 | End: 2024-09-12

## 2024-09-12 RX ADMIN — SODIUM CHLORIDE 75 ML: 9 INJECTION, SOLUTION INTRAVENOUS at 12:17

## 2024-09-12 RX ADMIN — IOPAMIDOL 125 ML: 755 INJECTION, SOLUTION INTRAVENOUS at 12:17

## 2024-09-12 ASSESSMENT — ACTIVITIES OF DAILY LIVING (ADL)
ADLS_ACUITY_SCORE: 35
ADLS_ACUITY_SCORE: 35

## 2024-09-13 NOTE — RESULT ENCOUNTER NOTE
Dear Mr. Perea,    CT scan reveals improvement in cancer. This is good.    Please, call me with any questions.    Florencia Butler MD

## 2024-09-17 ENCOUNTER — ONCOLOGY VISIT (OUTPATIENT)
Dept: ONCOLOGY | Facility: CLINIC | Age: 84
End: 2024-09-17
Attending: INTERNAL MEDICINE
Payer: COMMERCIAL

## 2024-09-17 VITALS
DIASTOLIC BLOOD PRESSURE: 74 MMHG | HEIGHT: 70 IN | HEART RATE: 75 BPM | SYSTOLIC BLOOD PRESSURE: 144 MMHG | OXYGEN SATURATION: 97 % | BODY MASS INDEX: 30.06 KG/M2 | WEIGHT: 210 LBS | RESPIRATION RATE: 16 BRPM

## 2024-09-17 DIAGNOSIS — C7A.1 MALIGNANT POORLY DIFFERENTIATED NEUROENDOCRINE CARCINOMA (H): Primary | ICD-10-CM

## 2024-09-17 PROCEDURE — 99214 OFFICE O/P EST MOD 30 MIN: CPT | Performed by: INTERNAL MEDICINE

## 2024-09-17 PROCEDURE — G0463 HOSPITAL OUTPT CLINIC VISIT: HCPCS | Performed by: INTERNAL MEDICINE

## 2024-09-17 RX ORDER — HEPARIN SODIUM,PORCINE 10 UNIT/ML
5-20 VIAL (ML) INTRAVENOUS DAILY PRN
Status: CANCELLED | OUTPATIENT
Start: 2024-09-27

## 2024-09-17 RX ORDER — ALBUTEROL SULFATE 90 UG/1
1-2 AEROSOL, METERED RESPIRATORY (INHALATION)
Status: CANCELLED
Start: 2024-09-25

## 2024-09-17 RX ORDER — ALBUTEROL SULFATE 0.83 MG/ML
2.5 SOLUTION RESPIRATORY (INHALATION)
Status: CANCELLED | OUTPATIENT
Start: 2024-09-27

## 2024-09-17 RX ORDER — EPINEPHRINE 1 MG/ML
0.3 INJECTION, SOLUTION INTRAMUSCULAR; SUBCUTANEOUS EVERY 5 MIN PRN
Status: CANCELLED | OUTPATIENT
Start: 2024-09-26

## 2024-09-17 RX ORDER — PALONOSETRON 0.05 MG/ML
0.25 INJECTION, SOLUTION INTRAVENOUS ONCE
Status: CANCELLED | OUTPATIENT
Start: 2024-09-25

## 2024-09-17 RX ORDER — EPINEPHRINE 1 MG/ML
0.3 INJECTION, SOLUTION INTRAMUSCULAR; SUBCUTANEOUS EVERY 5 MIN PRN
Status: CANCELLED | OUTPATIENT
Start: 2024-09-25

## 2024-09-17 RX ORDER — MEPERIDINE HYDROCHLORIDE 25 MG/ML
25 INJECTION INTRAMUSCULAR; INTRAVENOUS; SUBCUTANEOUS EVERY 30 MIN PRN
Status: CANCELLED | OUTPATIENT
Start: 2024-09-26

## 2024-09-17 RX ORDER — HEPARIN SODIUM (PORCINE) LOCK FLUSH IV SOLN 100 UNIT/ML 100 UNIT/ML
5 SOLUTION INTRAVENOUS
Status: CANCELLED | OUTPATIENT
Start: 2024-09-27

## 2024-09-17 RX ORDER — HEPARIN SODIUM (PORCINE) LOCK FLUSH IV SOLN 100 UNIT/ML 100 UNIT/ML
5 SOLUTION INTRAVENOUS
Status: CANCELLED | OUTPATIENT
Start: 2024-09-25

## 2024-09-17 RX ORDER — LORAZEPAM 2 MG/ML
0.5 INJECTION INTRAMUSCULAR EVERY 4 HOURS PRN
Status: CANCELLED | OUTPATIENT
Start: 2024-09-25

## 2024-09-17 RX ORDER — DIPHENHYDRAMINE HYDROCHLORIDE 50 MG/ML
50 INJECTION INTRAMUSCULAR; INTRAVENOUS
Status: CANCELLED
Start: 2024-09-25

## 2024-09-17 RX ORDER — MEPERIDINE HYDROCHLORIDE 25 MG/ML
25 INJECTION INTRAMUSCULAR; INTRAVENOUS; SUBCUTANEOUS EVERY 30 MIN PRN
Status: CANCELLED | OUTPATIENT
Start: 2024-09-25

## 2024-09-17 RX ORDER — ALBUTEROL SULFATE 90 UG/1
1-2 AEROSOL, METERED RESPIRATORY (INHALATION)
Status: CANCELLED
Start: 2024-09-27

## 2024-09-17 RX ORDER — LORAZEPAM 2 MG/ML
0.5 INJECTION INTRAMUSCULAR EVERY 4 HOURS PRN
Status: CANCELLED | OUTPATIENT
Start: 2024-09-27

## 2024-09-17 RX ORDER — METHYLPREDNISOLONE SODIUM SUCCINATE 125 MG/2ML
125 INJECTION, POWDER, LYOPHILIZED, FOR SOLUTION INTRAMUSCULAR; INTRAVENOUS
Status: CANCELLED
Start: 2024-09-25

## 2024-09-17 RX ORDER — HEPARIN SODIUM,PORCINE 10 UNIT/ML
5-20 VIAL (ML) INTRAVENOUS DAILY PRN
Status: CANCELLED | OUTPATIENT
Start: 2024-09-26

## 2024-09-17 RX ORDER — EPINEPHRINE 1 MG/ML
0.3 INJECTION, SOLUTION INTRAMUSCULAR; SUBCUTANEOUS EVERY 5 MIN PRN
Status: CANCELLED | OUTPATIENT
Start: 2024-09-27

## 2024-09-17 RX ORDER — DIPHENHYDRAMINE HYDROCHLORIDE 50 MG/ML
50 INJECTION INTRAMUSCULAR; INTRAVENOUS
Status: CANCELLED
Start: 2024-09-27

## 2024-09-17 RX ORDER — METHYLPREDNISOLONE SODIUM SUCCINATE 125 MG/2ML
125 INJECTION, POWDER, LYOPHILIZED, FOR SOLUTION INTRAMUSCULAR; INTRAVENOUS
Status: CANCELLED
Start: 2024-09-26

## 2024-09-17 RX ORDER — ALBUTEROL SULFATE 0.83 MG/ML
2.5 SOLUTION RESPIRATORY (INHALATION)
Status: CANCELLED | OUTPATIENT
Start: 2024-09-25

## 2024-09-17 RX ORDER — ALBUTEROL SULFATE 90 UG/1
1-2 AEROSOL, METERED RESPIRATORY (INHALATION)
Status: CANCELLED
Start: 2024-09-26

## 2024-09-17 RX ORDER — LORAZEPAM 2 MG/ML
0.5 INJECTION INTRAMUSCULAR EVERY 4 HOURS PRN
Status: CANCELLED | OUTPATIENT
Start: 2024-09-26

## 2024-09-17 RX ORDER — DIPHENHYDRAMINE HYDROCHLORIDE 50 MG/ML
50 INJECTION INTRAMUSCULAR; INTRAVENOUS
Status: CANCELLED
Start: 2024-09-26

## 2024-09-17 RX ORDER — HEPARIN SODIUM (PORCINE) LOCK FLUSH IV SOLN 100 UNIT/ML 100 UNIT/ML
5 SOLUTION INTRAVENOUS
Status: CANCELLED | OUTPATIENT
Start: 2024-09-26

## 2024-09-17 RX ORDER — METHYLPREDNISOLONE SODIUM SUCCINATE 125 MG/2ML
125 INJECTION, POWDER, LYOPHILIZED, FOR SOLUTION INTRAMUSCULAR; INTRAVENOUS
Status: CANCELLED
Start: 2024-09-27

## 2024-09-17 RX ORDER — ALBUTEROL SULFATE 0.83 MG/ML
2.5 SOLUTION RESPIRATORY (INHALATION)
Status: CANCELLED | OUTPATIENT
Start: 2024-09-26

## 2024-09-17 RX ORDER — MEPERIDINE HYDROCHLORIDE 25 MG/ML
25 INJECTION INTRAMUSCULAR; INTRAVENOUS; SUBCUTANEOUS EVERY 30 MIN PRN
Status: CANCELLED | OUTPATIENT
Start: 2024-09-27

## 2024-09-17 RX ORDER — HEPARIN SODIUM,PORCINE 10 UNIT/ML
5-20 VIAL (ML) INTRAVENOUS DAILY PRN
Status: CANCELLED | OUTPATIENT
Start: 2024-09-25

## 2024-09-17 ASSESSMENT — PAIN SCALES - GENERAL: PAINLEVEL: NO PAIN (0)

## 2024-09-17 NOTE — PROGRESS NOTES
"Oncology Rooming Note    September 17, 2024 3:12 PM   Pascual Perea is a 84 year old male who presents for:    Chief Complaint   Patient presents with    Oncology Clinic Visit     Initial Vitals: There were no vitals taken for this visit. Estimated body mass index is 29.64 kg/m  as calculated from the following:    Height as of 9/5/24: 1.803 m (5' 10.98\").    Weight as of 9/5/24: 96.3 kg (212 lb 6.4 oz). There is no height or weight on file to calculate BSA.  Data Unavailable Comment: Data Unavailable   No LMP for male patient.  Allergies reviewed: Yes  Medications reviewed: Yes    Medications: Medication refills not needed today.  Pharmacy name entered into [employer]: [facility] [place] - [address]    Frailty Screening:   Is the patient here for a new oncology consult visit in cancer care? 2. No        Tigist Jones MA            "

## 2024-09-17 NOTE — LETTER
"9/17/2024      Pascual Perea  [address]  [facility] [address]      Dear Colleague,    Thank you for referring your patient, Pascual Perea, to the [facility]. Please see a copy of my visit note below.    Oncology Rooming Note    September 17, 2024 3:12 PM   Pascual Perea is a 84 year old male who presents for:    Chief Complaint   Patient presents with     Oncology Clinic Visit     Initial Vitals: There were no vitals taken for this visit. Estimated body mass index is 29.64 kg/m  as calculated from the following:    Height as of 9/5/24: 1.803 m (5' 10.98\").    Weight as of 9/5/24: 96.3 kg (212 lb 6.4 oz). There is no height or weight on file to calculate BSA.  Data Unavailable Comment: Data Unavailable   No LMP for male patient.  Allergies reviewed: Yes  Medications reviewed: Yes    Medications: Medication refills not needed today.  Pharmacy name entered into [facility]: [facility] - [address]    Frailty Screening:   Is the patient here for a new oncology consult visit in cancer care? 2. No        Tigist Jones MA              ONCOLOGY HISTORY: Mr. 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     1.  CT neck on 06/20/2024 revealed 2.8 cm mass in left parotid gland.  Also revealed multiple enlarged left-sided cervical lymph node.  2.  Ultrasound-guided biopsy of left cervical mass on 06/28/2024 revealed METASTATIC HIGH-GRADE NEUROENDOCRINE CARCINOMA, LARGE CELL TYPE, at least 1.2 cm in linear extent, extending to biopsy margins.  3.  PET scan on 07/19/2024 revealed hypermetabolic left parotid mass and multiple hypermetabolic left cervical lymphadenopathy.  Severely stenotic left internal jugular vein secondary to mass effect.  4. He was seen by ENT at [facility].  Flexible fiberoptic laryngoscopy on 07/15/2024 was normal.  5. Case " discussed at tumor board at [facility].  Tumor board recommendation was to treat it as small cell lung cancer.  Recommendation was to give him 4 cycle of platinum and etoposide and add radiation with cycle 2 or 3.  6. Carboplatin and etoposide started on 08/14/2024 (carboplatin chosen because of CKD, hearing loss and his age).     Subjective:   Mr. 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 He is on chemotherapy with carboplatin and etoposide and radiation.    CT scan on 09/12/2024:  1. Decreased size of the previously demonstrated biopsy-proven malignant left parotid mass.  2. Decreased metastatic left cervical lymphadenopathy. No new or enlarging cervical lymphadenopathy identified.   3. No evidence of metastatic disease within the chest, abdomen or pelvis.    Patient overall tolerating treatment well.  He has mild generalized weakness.  No headache.  No dizziness.  No chest pain.  No shortness of breath.  No abdominal pain.  No nausea or vomiting.  Appetite is good.  No problems swallowing.  No pain during swallowing.  No urinary or bowel complaints.  He has mild numbness and tingling in the fingers and toes.  No worsening.  It was present even before chemotherapy was started.  It is from diabetes.    Exam:  He is alert and oriented x 3.  Not in any distress.  Vitals: Reviewed.  ECOG PS of 1.  Rest of the system is not examined.     Labs: Reviewed.     Assessment:  1.  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  Disease is responding.  2.  Chronic kidney disease.    3.  Intermittent chronic hypercalcemia.    4.  Sensorineural hearing loss.  5.  Grade 1 peripheral neuropathy.  6.  Normocytic anemia  secondary to chemoradiation, renal disease and anemia of chronic disease.  7.  Multiple other medical problems including hypertension, hyperlipidemia, hypothyroidism and coronary artery disease.     Plan:  -Continue chemotherapy.  -See me or KOJO with cycle 4.     Discussion:  1.  CT scan was reviewed with the patient and his daughter.  It reveals improvement in disease.  They were happy to know that.    2.  For high-grade neuroendocrine carcinoma, patient will be continued on concurrent chemoradiation.  He is tolerating it well.  Explained to the patient that he may develop some side effects in next few weeks including worsening fatigue, dysphagia and pain in the throat.  His feeding may decrease.  He may need IV fluid.  Advised him to call us if he starts having any signs of dehydration.    3.  He and his daughter had few questions which were all answered.  He will be seen with cycle 4.  Advised them to call us with any questions or concerns.    Total visit time of 30 minutes.  Time spent in today's visit, review of chart/investigations today, monitoring for toxicity of high risk drugs today and documentation.      Again, thank you for allowing me to participate in the care of your patient.        Sincerely,        Florencia Butler MD

## 2024-09-22 NOTE — PROGRESS NOTES
ONCOLOGY HISTORY: Mr. 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     1.  CT neck on 06/20/2024 revealed 2.8 cm mass in left parotid gland.  Also revealed multiple enlarged left-sided cervical lymph node.  2.  Ultrasound-guided biopsy of left cervical mass on 06/28/2024 revealed METASTATIC HIGH-GRADE NEUROENDOCRINE CARCINOMA, LARGE CELL TYPE, at least 1.2 cm in linear extent, extending to biopsy margins.  3.  PET scan on 07/19/2024 revealed hypermetabolic left parotid mass and multiple hypermetabolic left cervical lymphadenopathy.  Severely stenotic left internal jugular vein secondary to mass effect.  4. He was seen by ENT at [facility].  Flexible fiberoptic laryngoscopy on 07/15/2024 was normal.  5. Case discussed at tumor board at [facility].  Tumor board recommendation was to treat it as small cell lung cancer.  Recommendation was to give him 4 cycle of platinum and etoposide and add radiation with cycle 2 or 3.  6. Carboplatin and etoposide started on 08/14/2024 (carboplatin chosen because of CKD, hearing loss and his age).     Subjective:   Mr. 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 He is on chemotherapy with carboplatin and etoposide and radiation.    CT scan on 09/12/2024:  1. Decreased size of the previously demonstrated biopsy-proven malignant left parotid mass.  2. Decreased metastatic left cervical lymphadenopathy. No new or enlarging cervical lymphadenopathy identified.   3. No evidence of metastatic disease within the chest, abdomen or pelvis.    Patient overall tolerating treatment well.  He has mild generalized weakness.  No headache.  No dizziness.  No chest pain.  No shortness of breath.  No abdominal  pain.  No nausea or vomiting.  Appetite is good.  No problems swallowing.  No pain during swallowing.  No urinary or bowel complaints.  He has mild numbness and tingling in the fingers and toes.  No worsening.  It was present even before chemotherapy was started.  It is from diabetes.    Exam:  He is alert and oriented x 3.  Not in any distress.  Vitals: Reviewed.  ECOG PS of 1.  Rest of the system is not examined.     Labs: Reviewed.     Assessment:  1.  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  Disease is responding.  2.  Chronic kidney disease.    3.  Intermittent chronic hypercalcemia.    4.  Sensorineural hearing loss.  5.  Grade 1 peripheral neuropathy.  6.  Normocytic anemia secondary to chemoradiation, renal disease and anemia of chronic disease.  7.  Multiple other medical problems including hypertension, hyperlipidemia, hypothyroidism and coronary artery disease.     Plan:  -Continue chemotherapy.  -See me or KOJO with cycle 4.     Discussion:  1.  CT scan was reviewed with the patient and his daughter.  It reveals improvement in disease.  They were happy to know that.    2.  For high-grade neuroendocrine carcinoma, patient will be continued on concurrent chemoradiation.  He is tolerating it well.  Explained to the patient that he may develop some side effects in next few weeks including worsening fatigue, dysphagia and pain in the throat.  His feeding may decrease.  He may need IV fluid.  Advised him to call us if he starts having any signs of dehydration.    3.  He and his daughter had few questions which were all answered.  He will be seen with cycle 4.  Advised them to call us with any questions or concerns.    Total visit time of 30 minutes.  Time spent in today's visit, review of chart/investigations today, monitoring for toxicity of high risk drugs today and  documentation.

## 2024-09-25 ENCOUNTER — INFUSION THERAPY VISIT (OUTPATIENT)
Dept: INFUSION THERAPY | Facility: CLINIC | Age: 84
End: 2024-09-25
Attending: INTERNAL MEDICINE
Payer: COMMERCIAL

## 2024-09-25 VITALS
HEART RATE: 71 BPM | TEMPERATURE: 98.3 F | DIASTOLIC BLOOD PRESSURE: 55 MMHG | RESPIRATION RATE: 16 BRPM | OXYGEN SATURATION: 95 % | WEIGHT: 205.8 LBS | SYSTOLIC BLOOD PRESSURE: 116 MMHG | BODY MASS INDEX: 29.53 KG/M2

## 2024-09-25 DIAGNOSIS — D70.1 CHEMOTHERAPY-INDUCED NEUTROPENIA (H): Primary | ICD-10-CM

## 2024-09-25 DIAGNOSIS — C7A.1 MALIGNANT POORLY DIFFERENTIATED NEUROENDOCRINE CARCINOMA (H): ICD-10-CM

## 2024-09-25 DIAGNOSIS — C7A.1 HIGH GRADE NEUROENDOCRINE CARCINOMA (H): ICD-10-CM

## 2024-09-25 DIAGNOSIS — J31.0 CHRONIC RHINITIS: ICD-10-CM

## 2024-09-25 DIAGNOSIS — T45.1X5A CHEMOTHERAPY-INDUCED NEUTROPENIA (H): Primary | ICD-10-CM

## 2024-09-25 DIAGNOSIS — D64.9 ANEMIA: ICD-10-CM

## 2024-09-25 LAB
ABO/RH(D): NORMAL
ALBUMIN SERPL BCG-MCNC: 4.1 G/DL (ref 3.5–5.2)
ALP SERPL-CCNC: 78 U/L (ref 40–150)
ALT SERPL W P-5'-P-CCNC: 30 U/L (ref 0–70)
ANION GAP SERPL CALCULATED.3IONS-SCNC: 12 MMOL/L (ref 7–15)
ANTIBODY SCREEN: NEGATIVE
AST SERPL W P-5'-P-CCNC: 25 U/L (ref 0–45)
BASOPHILS # BLD AUTO: 0 10E3/UL (ref 0–0.2)
BASOPHILS NFR BLD AUTO: 1 %
BILIRUB SERPL-MCNC: 0.6 MG/DL
BUN SERPL-MCNC: 16.3 MG/DL (ref 8–23)
CALCIUM SERPL-MCNC: 9.7 MG/DL (ref 8.8–10.4)
CHLORIDE SERPL-SCNC: 105 MMOL/L (ref 98–107)
CREAT SERPL-MCNC: 1.39 MG/DL (ref 0.67–1.17)
EGFRCR SERPLBLD CKD-EPI 2021: 50 ML/MIN/1.73M2
EOSINOPHIL # BLD AUTO: 0 10E3/UL (ref 0–0.7)
EOSINOPHIL NFR BLD AUTO: 1 %
ERYTHROCYTE [DISTWIDTH] IN BLOOD BY AUTOMATED COUNT: 21.9 % (ref 10–15)
GLUCOSE SERPL-MCNC: 190 MG/DL (ref 70–99)
HCO3 SERPL-SCNC: 22 MMOL/L (ref 22–29)
HCT VFR BLD AUTO: 23.1 % (ref 40–53)
HGB BLD-MCNC: 7.2 G/DL (ref 13.3–17.7)
HOLD SPECIMEN: NORMAL
IMM GRANULOCYTES # BLD: 0.1 10E3/UL
IMM GRANULOCYTES NFR BLD: 1 %
LYMPHOCYTES # BLD AUTO: 0.4 10E3/UL (ref 0.8–5.3)
LYMPHOCYTES NFR BLD AUTO: 8 %
MCH RBC QN AUTO: 29.1 PG (ref 26.5–33)
MCHC RBC AUTO-ENTMCNC: 31.2 G/DL (ref 31.5–36.5)
MCV RBC AUTO: 94 FL (ref 78–100)
MONOCYTES # BLD AUTO: 0.5 10E3/UL (ref 0–1.3)
MONOCYTES NFR BLD AUTO: 10 %
NEUTROPHILS # BLD AUTO: 4.2 10E3/UL (ref 1.6–8.3)
NEUTROPHILS NFR BLD AUTO: 80 %
NRBC # BLD AUTO: 0 10E3/UL
NRBC BLD AUTO-RTO: 0 /100
PLATELET # BLD AUTO: 159 10E3/UL (ref 150–450)
POTASSIUM SERPL-SCNC: 4.3 MMOL/L (ref 3.4–5.3)
PROT SERPL-MCNC: 6.2 G/DL (ref 6.4–8.3)
RBC # BLD AUTO: 2.47 10E6/UL (ref 4.4–5.9)
SODIUM SERPL-SCNC: 139 MMOL/L (ref 135–145)
SPECIMEN EXPIRATION DATE: NORMAL
WBC # BLD AUTO: 5.3 10E3/UL (ref 4–11)

## 2024-09-25 PROCEDURE — 36591 DRAW BLOOD OFF VENOUS DEVICE: CPT | Performed by: INTERNAL MEDICINE

## 2024-09-25 PROCEDURE — 96367 TX/PROPH/DG ADDL SEQ IV INF: CPT

## 2024-09-25 PROCEDURE — 36591 DRAW BLOOD OFF VENOUS DEVICE: CPT

## 2024-09-25 PROCEDURE — 85025 COMPLETE CBC W/AUTO DIFF WBC: CPT | Performed by: INTERNAL MEDICINE

## 2024-09-25 PROCEDURE — 250N000011 HC RX IP 250 OP 636: Performed by: INTERNAL MEDICINE

## 2024-09-25 PROCEDURE — 96417 CHEMO IV INFUS EACH ADDL SEQ: CPT

## 2024-09-25 PROCEDURE — 96375 TX/PRO/DX INJ NEW DRUG ADDON: CPT

## 2024-09-25 PROCEDURE — 80053 COMPREHEN METABOLIC PANEL: CPT | Performed by: INTERNAL MEDICINE

## 2024-09-25 PROCEDURE — 86900 BLOOD TYPING SEROLOGIC ABO: CPT | Performed by: INTERNAL MEDICINE

## 2024-09-25 PROCEDURE — 96413 CHEMO IV INFUSION 1 HR: CPT

## 2024-09-25 PROCEDURE — 86923 COMPATIBILITY TEST ELECTRIC: CPT | Performed by: INTERNAL MEDICINE

## 2024-09-25 PROCEDURE — 258N000003 HC RX IP 258 OP 636: Performed by: INTERNAL MEDICINE

## 2024-09-25 RX ORDER — HEPARIN SODIUM,PORCINE 10 UNIT/ML
5-20 VIAL (ML) INTRAVENOUS DAILY PRN
Status: DISCONTINUED | OUTPATIENT
Start: 2024-09-25 | End: 2024-09-25 | Stop reason: HOSPADM

## 2024-09-25 RX ORDER — PALONOSETRON 0.05 MG/ML
0.25 INJECTION, SOLUTION INTRAVENOUS ONCE
Status: COMPLETED | OUTPATIENT
Start: 2024-09-25 | End: 2024-09-25

## 2024-09-25 RX ORDER — AZELASTINE HYDROCHLORIDE 137 UG/1
2 SPRAY, METERED NASAL 2 TIMES DAILY
Qty: 30 ML | Refills: 1 | Status: SHIPPED | OUTPATIENT
Start: 2024-09-25

## 2024-09-25 RX ADMIN — SODIUM CHLORIDE 250 ML: 9 INJECTION, SOLUTION INTRAVENOUS at 14:38

## 2024-09-25 RX ADMIN — FOSAPREPITANT: 150 INJECTION, POWDER, LYOPHILIZED, FOR SOLUTION INTRAVENOUS at 14:43

## 2024-09-25 RX ADMIN — CARBOPLATIN 350 MG: 10 INJECTION, SOLUTION INTRAVENOUS at 15:07

## 2024-09-25 RX ADMIN — SODIUM CHLORIDE 170 MG: 9 INJECTION, SOLUTION INTRAVENOUS at 15:43

## 2024-09-25 RX ADMIN — HEPARIN, PORCINE (PF) 10 UNIT/ML INTRAVENOUS SYRINGE 5 ML: at 16:46

## 2024-09-25 RX ADMIN — PALONOSETRON HYDROCHLORIDE 0.25 MG: 0.25 INJECTION INTRAVENOUS at 14:38

## 2024-09-25 ASSESSMENT — PAIN SCALES - GENERAL: PAINLEVEL: NO PAIN (0)

## 2024-09-25 NOTE — PROGRESS NOTES
Infusion Nursing Note:  Pascual Perea presents today for C3D1 Carbo/Etoposide.    Patient seen by provider today: No   present during visit today: Not Applicable.    Note: Hgb dropped from 9.0 to 7.2 today. No bleeding sign noted. Pt states his fatigue has not been changed. Vital signs stable. Requested lab to re-test Hgb-7.5. Dr. Butler notified.   Pt will have transfusion tomorrow- blood consent obtained per Branden Bear, GABRIELA and T&C sent          Intravenous Access:  Implanted Port.    Treatment Conditions:  Lab Results   Component Value Date    HGB 7.2 (L) 09/25/2024    WBC 5.3 09/25/2024    ANEU 2.6 04/13/2020    ANEUTAUTO 4.2 09/25/2024     09/25/2024        Lab Results   Component Value Date     09/25/2024    POTASSIUM 4.3 09/25/2024    MAG 2.0 06/29/2023    CR 1.39 (H) 09/25/2024    STARR 9.7 09/25/2024    BILITOTAL 0.6 09/25/2024    ALBUMIN 4.1 09/25/2024    ALT 30 09/25/2024    AST 25 09/25/2024       Results reviewed, labs MET treatment parameters, ok to proceed with treatment.      Post Infusion Assessment:  Patient tolerated infusion without incident.  Blood return noted pre and post infusion.  Site patent and intact, free from redness, edema or discomfort.  No evidence of extravasations.  Access left in place for tomorrow use      Discharge Plan:   Discharge instructions reviewed with: Patient.  Patient and/or family verbalized understanding of discharge instructions and all questions answered.  AVS to patient via [employer].  Patient will return tomorrow for next appointment.   Patient discharged in stable condition accompanied by: self and son.  Departure Mode: Ambulatory.      Ruben Robbins RN

## 2024-09-26 ENCOUNTER — INFUSION THERAPY VISIT (OUTPATIENT)
Dept: INFUSION THERAPY | Facility: CLINIC | Age: 84
End: 2024-09-26
Attending: INTERNAL MEDICINE
Payer: COMMERCIAL

## 2024-09-26 VITALS
DIASTOLIC BLOOD PRESSURE: 70 MMHG | HEART RATE: 68 BPM | OXYGEN SATURATION: 97 % | TEMPERATURE: 97.4 F | RESPIRATION RATE: 18 BRPM | SYSTOLIC BLOOD PRESSURE: 133 MMHG

## 2024-09-26 DIAGNOSIS — T45.1X5A CHEMOTHERAPY-INDUCED NEUTROPENIA (H): ICD-10-CM

## 2024-09-26 DIAGNOSIS — C7A.1 MALIGNANT POORLY DIFFERENTIATED NEUROENDOCRINE CARCINOMA (H): ICD-10-CM

## 2024-09-26 DIAGNOSIS — C7A.1 HIGH GRADE NEUROENDOCRINE CARCINOMA (H): Primary | ICD-10-CM

## 2024-09-26 DIAGNOSIS — D70.1 CHEMOTHERAPY-INDUCED NEUTROPENIA (H): ICD-10-CM

## 2024-09-26 DIAGNOSIS — D64.9 ANEMIA, UNSPECIFIED TYPE: ICD-10-CM

## 2024-09-26 LAB
BLD PROD TYP BPU: NORMAL
BLOOD COMPONENT TYPE: NORMAL
CODING SYSTEM: NORMAL
CROSSMATCH: NORMAL
ISSUE DATE AND TIME: NORMAL
UNIT ABO/RH: NORMAL
UNIT NUMBER: NORMAL
UNIT STATUS: NORMAL
UNIT TYPE ISBT: 600

## 2024-09-26 PROCEDURE — 258N000003 HC RX IP 258 OP 636: Performed by: INTERNAL MEDICINE

## 2024-09-26 PROCEDURE — 96413 CHEMO IV INFUSION 1 HR: CPT

## 2024-09-26 PROCEDURE — P9016 RBC LEUKOCYTES REDUCED: HCPCS | Performed by: INTERNAL MEDICINE

## 2024-09-26 PROCEDURE — 96375 TX/PRO/DX INJ NEW DRUG ADDON: CPT

## 2024-09-26 PROCEDURE — 36430 TRANSFUSION BLD/BLD COMPNT: CPT

## 2024-09-26 PROCEDURE — 86923 COMPATIBILITY TEST ELECTRIC: CPT | Performed by: INTERNAL MEDICINE

## 2024-09-26 PROCEDURE — 250N000011 HC RX IP 250 OP 636: Performed by: INTERNAL MEDICINE

## 2024-09-26 RX ORDER — HEPARIN SODIUM,PORCINE 10 UNIT/ML
5-20 VIAL (ML) INTRAVENOUS DAILY PRN
Status: DISCONTINUED | OUTPATIENT
Start: 2024-09-26 | End: 2024-09-26 | Stop reason: HOSPADM

## 2024-09-26 RX ADMIN — HEPARIN, PORCINE (PF) 10 UNIT/ML INTRAVENOUS SYRINGE 5 ML: at 15:35

## 2024-09-26 RX ADMIN — SODIUM CHLORIDE 250 ML: 9 INJECTION, SOLUTION INTRAVENOUS at 10:51

## 2024-09-26 RX ADMIN — SODIUM CHLORIDE 170 MG: 9 INJECTION, SOLUTION INTRAVENOUS at 11:03

## 2024-09-26 RX ADMIN — DEXAMETHASONE SODIUM PHOSPHATE 12 MG: 10 INJECTION, SOLUTION INTRAMUSCULAR; INTRAVENOUS at 10:52

## 2024-09-26 ASSESSMENT — PAIN SCALES - GENERAL: PAINLEVEL: NO PAIN (0)

## 2024-09-26 NOTE — PROGRESS NOTES
Infusion Nursing Note:  Pascual Perea presents today for C3D2 Toposar+1 unit of PRBCs.   Patient seen by provider today: No   present during visit today: Not Applicable.    Note: Pt reports no new health changes or concerns today.      Intravenous Access:  Implanted Port.    Treatment Conditions:  Lab Results   Component Value Date    HGB 7.2 (L) 09/25/2024    WBC 5.3 09/25/2024    ANEU 2.6 04/13/2020    ANEUTAUTO 4.2 09/25/2024     09/25/2024        Lab Results   Component Value Date     09/25/2024    POTASSIUM 4.3 09/25/2024    MAG 2.0 06/29/2023    CR 1.39 (H) 09/25/2024    STARR 9.7 09/25/2024    BILITOTAL 0.6 09/25/2024    ALBUMIN 4.1 09/25/2024    ALT 30 09/25/2024    AST 25 09/25/2024       Results reviewed, labs MET treatment parameters, ok to proceed with treatment.  Blood transfusion consent signed 9/25/24.      Post Infusion Assessment:  Patient tolerated infusion without incident.  Patient tolerated blood transfusion without incident.  Blood return noted pre and post infusion.  Site patent and intact, free from redness, edema or discomfort.  No evidence of extravasations.  Access not discontinued per protocol, per pt request and Md order. Pt to come back tomorrow for 3rd day of chemo.       Discharge Plan:   Discharge instructions reviewed with: Patient and Family.  Patient and/or family verbalized understanding of discharge instructions and all questions answered.  AVS to patient via [employer].  Patient will return 9/27/24 for next appointment.   Patient discharged in stable condition accompanied by: self and son.  Departure Mode: Ambulatory.      Kelly Weeks RN

## 2024-09-27 ENCOUNTER — INFUSION THERAPY VISIT (OUTPATIENT)
Dept: INFUSION THERAPY | Facility: CLINIC | Age: 84
End: 2024-09-27
Attending: INTERNAL MEDICINE
Payer: COMMERCIAL

## 2024-09-27 VITALS
BODY MASS INDEX: 29.48 KG/M2 | TEMPERATURE: 97.4 F | SYSTOLIC BLOOD PRESSURE: 151 MMHG | OXYGEN SATURATION: 97 % | HEART RATE: 82 BPM | WEIGHT: 205.91 LBS | DIASTOLIC BLOOD PRESSURE: 74 MMHG | RESPIRATION RATE: 16 BRPM | HEIGHT: 70 IN

## 2024-09-27 DIAGNOSIS — C7A.1 HIGH GRADE NEUROENDOCRINE CARCINOMA (H): Primary | ICD-10-CM

## 2024-09-27 DIAGNOSIS — C7A.1 MALIGNANT POORLY DIFFERENTIATED NEUROENDOCRINE CARCINOMA (H): ICD-10-CM

## 2024-09-27 DIAGNOSIS — T45.1X5A CHEMOTHERAPY-INDUCED NEUTROPENIA (H): ICD-10-CM

## 2024-09-27 DIAGNOSIS — D70.1 CHEMOTHERAPY-INDUCED NEUTROPENIA (H): ICD-10-CM

## 2024-09-27 PROCEDURE — 258N000003 HC RX IP 258 OP 636: Performed by: INTERNAL MEDICINE

## 2024-09-27 PROCEDURE — 96372 THER/PROPH/DIAG INJ SC/IM: CPT | Performed by: INTERNAL MEDICINE

## 2024-09-27 PROCEDURE — 96367 TX/PROPH/DG ADDL SEQ IV INF: CPT

## 2024-09-27 PROCEDURE — 250N000011 HC RX IP 250 OP 636: Performed by: INTERNAL MEDICINE

## 2024-09-27 PROCEDURE — 96413 CHEMO IV INFUSION 1 HR: CPT

## 2024-09-27 PROCEDURE — 96377 APPLICATON ON-BODY INJECTOR: CPT | Mod: XS | Performed by: INTERNAL MEDICINE

## 2024-09-27 RX ORDER — HEPARIN SODIUM (PORCINE) LOCK FLUSH IV SOLN 100 UNIT/ML 100 UNIT/ML
5 SOLUTION INTRAVENOUS
Status: DISCONTINUED | OUTPATIENT
Start: 2024-09-27 | End: 2024-09-27 | Stop reason: HOSPADM

## 2024-09-27 RX ADMIN — SODIUM CHLORIDE 250 ML: 9 INJECTION, SOLUTION INTRAVENOUS at 08:31

## 2024-09-27 RX ADMIN — DEXAMETHASONE SODIUM PHOSPHATE 12 MG: 10 INJECTION, SOLUTION INTRAMUSCULAR; INTRAVENOUS at 08:30

## 2024-09-27 RX ADMIN — PEGFILGRASTIM 6 MG: KIT SUBCUTANEOUS at 08:51

## 2024-09-27 RX ADMIN — Medication 5 ML: at 09:51

## 2024-09-27 RX ADMIN — SODIUM CHLORIDE 170 MG: 9 INJECTION, SOLUTION INTRAVENOUS at 08:48

## 2024-09-27 ASSESSMENT — PAIN SCALES - GENERAL: PAINLEVEL: NO PAIN (0)

## 2024-09-27 NOTE — PATIENT INSTRUCTIONS
Your On-body Neulasta Injector was applied to your abdomen at 8:55AM.  At approximately 11:55AM on 9/28, your On-body Injector will beep to let you know your dose delivery will begin in 2 minutes.  Your medication will be delivered over the next 45 minutes.  You can remove your Injector at 12:55PM.  Please make sure your Injector has a solid green light or has turned off prior to removing the device.  Please contact your provider at [phone] with questions or concerns.

## 2024-09-27 NOTE — PROGRESS NOTES
Infusion Nursing Note:  Pascual Perea presents today for Day 3 etoposide and onpro.    Patient seen by provider today: No   present during visit today: Not Applicable.    Note: pt reports he had a blood transfusion yesterday and feels that it has improved his mobility and SOB.    Intravenous Access:  Implanted Port.  Excellent blood return noted.    Treatment Conditions:  Lab Results   Component Value Date    HGB 7.2 (L) 09/25/2024    WBC 5.3 09/25/2024    ANEU 2.6 04/13/2020    ANEUTAUTO 4.2 09/25/2024     09/25/2024        Lab Results   Component Value Date     09/25/2024    POTASSIUM 4.3 09/25/2024    MAG 2.0 06/29/2023    CR 1.39 (H) 09/25/2024    STARR 9.7 09/25/2024    BILITOTAL 0.6 09/25/2024    ALBUMIN 4.1 09/25/2024    ALT 30 09/25/2024    AST 25 09/25/2024       Results reviewed, labs MET treatment parameters, ok to proceed with treatment.      Post Infusion Assessment:  Patient tolerated infusion without incident.  Blood return noted pre and post infusion.  Site patent and intact, free from redness, edema or discomfort.  No evidence of extravasations.  Access discontinued per protocol.     ONPRO  Was placed on patient's: right side of abdomen.    Was placed at 0855 AM    Podpal used: Yes    ONPRO injector device Lot number: [id number]    Patient education included: what patient can expect after application, what colored lights mean on the device, when to remove device, when and where to call with questions or issues, all patients questions answered, and that Neulasta administration will occur at 11:55AM on 9/28.    Patient tolerated administration well.      Discharge Plan:   Discharge instructions reviewed with: Patient and Family.  Patient and/or family verbalized understanding of discharge instructions and all questions answered.  Copy of AVS reviewed with patient and/or family.  Patient will return 10/16/24 for next appointment.  Patient discharged in stable condition accompanied  by: self and son.  Departure Mode: Ambulatory.      Lisa Ivan RN

## 2024-10-01 ENCOUNTER — TELEPHONE (OUTPATIENT)
Dept: ANTICOAGULATION | Facility: CLINIC | Age: 84
End: 2024-10-01
Payer: COMMERCIAL

## 2024-10-01 DIAGNOSIS — I48.91 ATRIAL FIBRILLATION, UNSPECIFIED TYPE (H): Primary | ICD-10-CM

## 2024-10-01 NOTE — TELEPHONE ENCOUNTER
ANTICOAGULATION DIRECT ORAL ANTICOAGULANT MONITORING    SUBJECTIVE     The [facility] is evaluating Pascual Perea's Apixaban (Eliquis) as part of its Anticoagulation Monitoring Program.    Indication:Atrial Fibrillation  Current dose per medication list: Apixaban 2.5 mg BID  Recent hospitalizations/ED/Office Visits for bleeding/clotting concerns: No  Other bleeding or side effect concerns: No  Additional findings: apixaban dose was lowered during 12/25/21 hospitalization due to elevated CR, CR is currently < 1.5.    OBJECTIVE     Age: 84 year old    Wt Readings from Last 2 Encounters:   09/27/24 93.4 kg (205 lb 14.6 oz)   09/25/24 93.4 kg (205 lb 12.8 oz)      Lab Results   Component Value Date    CR 1.39 (H) 09/25/2024    CR 1.38 (H) 09/04/2024    CR 1.37 (H) 08/14/2024     Creatinine Clearance (using actual bodyweight, mL/min):     Lab Results   Component Value Date    HGB 7.2 09/25/2024    HGB 13.4 04/13/2020     09/25/2024     04/13/2020     ASSESSMENT/PLAN     A chart review for Direct Oral Anticoagulant (DOAC) Stewardship has been completed for:     Dosing: recommend adjustment to Apixaban 5mg BID due to not meeting 2 of 3 criteria as defined in the package insert. For patients with Atrial Fibrillation, nonevidence-based doses of DOACs should be avoided to minimize risks of preventable thromboembolism or major bleeding and to improve survival (Circulation. 2024;149:e1-e156.)     Plan made per [facility] anticoagulation protocol    Marianna Worthy RN  Anticoagulation Clinic

## 2024-10-01 NOTE — TELEPHONE ENCOUNTER
Given consistent creatinine  < 1.5 with last 3 readings, then agree with increasing Eliquis back to 5mg BID since only meeting one criteria for lower dose rec (age > 81yo) and needs 2 criteria to lower dose. Wt  is > 60kg)  Rx sent to pharmacy for  Eliquis 5mg BID. If pt has some 2.5mg tabs left over, may use them up by doubling the dose and take two 2.5mg tabs at same time ( to equal 5mg) BID until runs out. Rx sent to pharmacy. Inform pt of dose change

## 2024-10-02 NOTE — TELEPHONE ENCOUNTER
ANTICOAGULATION  STEWARDSHIP    Spoke with [facility] to review advised dosage change for Apixaban (Eliquis).    Education provided:  ok to finish up 2.5 mg tablets by taking 2 in the a.m. and 2 in the p.m., patient verbalized understanding.    They verbalized understanding of new Apixaban (Eliquis) dose/tablet strength  They were notified Rx for new dosage would be sent to preferred pharmacy    Marianna Worthy RN  [facility]

## 2024-10-15 LAB
ABO/RH(D): NORMAL
ANTIBODY SCREEN: NEGATIVE
SPECIMEN EXPIRATION DATE: NORMAL

## 2024-10-16 ENCOUNTER — INFUSION THERAPY VISIT (OUTPATIENT)
Dept: INFUSION THERAPY | Facility: CLINIC | Age: 84
End: 2024-10-16
Attending: INTERNAL MEDICINE
Payer: COMMERCIAL

## 2024-10-16 ENCOUNTER — VIRTUAL VISIT (OUTPATIENT)
Dept: ONCOLOGY | Facility: CLINIC | Age: 84
End: 2024-10-16
Attending: INTERNAL MEDICINE
Payer: COMMERCIAL

## 2024-10-16 VITALS
HEIGHT: 71 IN | SYSTOLIC BLOOD PRESSURE: 136 MMHG | WEIGHT: 189.2 LBS | DIASTOLIC BLOOD PRESSURE: 55 MMHG | BODY MASS INDEX: 26.49 KG/M2

## 2024-10-16 VITALS
SYSTOLIC BLOOD PRESSURE: 135 MMHG | DIASTOLIC BLOOD PRESSURE: 66 MMHG | TEMPERATURE: 97.9 F | HEART RATE: 84 BPM | RESPIRATION RATE: 20 BRPM | BODY MASS INDEX: 26.36 KG/M2 | OXYGEN SATURATION: 97 % | WEIGHT: 189 LBS

## 2024-10-16 DIAGNOSIS — C7A.1 MALIGNANT POORLY DIFFERENTIATED NEUROENDOCRINE CARCINOMA (H): ICD-10-CM

## 2024-10-16 DIAGNOSIS — C7A.1 HIGH GRADE NEUROENDOCRINE CARCINOMA (H): Primary | ICD-10-CM

## 2024-10-16 DIAGNOSIS — T45.1X5A CHEMOTHERAPY-INDUCED NEUTROPENIA (H): ICD-10-CM

## 2024-10-16 DIAGNOSIS — C7A.1 MALIGNANT POORLY DIFFERENTIATED NEUROENDOCRINE CARCINOMA (H): Primary | ICD-10-CM

## 2024-10-16 DIAGNOSIS — D70.1 CHEMOTHERAPY-INDUCED NEUTROPENIA (H): ICD-10-CM

## 2024-10-16 LAB
ALBUMIN SERPL BCG-MCNC: 4 G/DL (ref 3.5–5.2)
ALP SERPL-CCNC: 75 U/L (ref 40–150)
ALT SERPL W P-5'-P-CCNC: 14 U/L (ref 0–70)
ANION GAP SERPL CALCULATED.3IONS-SCNC: 12 MMOL/L (ref 7–15)
AST SERPL W P-5'-P-CCNC: 19 U/L (ref 0–45)
BASOPHILS # BLD AUTO: 0 10E3/UL (ref 0–0.2)
BASOPHILS NFR BLD AUTO: 0 %
BILIRUB SERPL-MCNC: 0.6 MG/DL
BLD PROD TYP BPU: NORMAL
BLOOD COMPONENT TYPE: NORMAL
BUN SERPL-MCNC: 15.8 MG/DL (ref 8–23)
CALCIUM SERPL-MCNC: 9.1 MG/DL (ref 8.8–10.4)
CHLORIDE SERPL-SCNC: 105 MMOL/L (ref 98–107)
CODING SYSTEM: NORMAL
CREAT SERPL-MCNC: 1.31 MG/DL (ref 0.67–1.17)
CROSSMATCH: NORMAL
EGFRCR SERPLBLD CKD-EPI 2021: 54 ML/MIN/1.73M2
EOSINOPHIL # BLD AUTO: 0 10E3/UL (ref 0–0.7)
EOSINOPHIL NFR BLD AUTO: 1 %
ERYTHROCYTE [DISTWIDTH] IN BLOOD BY AUTOMATED COUNT: 21.1 % (ref 10–15)
GLUCOSE SERPL-MCNC: 210 MG/DL (ref 70–99)
HCO3 SERPL-SCNC: 24 MMOL/L (ref 22–29)
HCT VFR BLD AUTO: 21.8 % (ref 40–53)
HGB BLD-MCNC: 6.9 G/DL (ref 13.3–17.7)
IMM GRANULOCYTES # BLD: 0 10E3/UL
IMM GRANULOCYTES NFR BLD: 0 %
ISSUE DATE AND TIME: NORMAL
LYMPHOCYTES # BLD AUTO: 0.3 10E3/UL (ref 0.8–5.3)
LYMPHOCYTES NFR BLD AUTO: 9 %
MCH RBC QN AUTO: 30.3 PG (ref 26.5–33)
MCHC RBC AUTO-ENTMCNC: 31.7 G/DL (ref 31.5–36.5)
MCV RBC AUTO: 96 FL (ref 78–100)
MONOCYTES # BLD AUTO: 0.3 10E3/UL (ref 0–1.3)
MONOCYTES NFR BLD AUTO: 9 %
NEUTROPHILS # BLD AUTO: 2.4 10E3/UL (ref 1.6–8.3)
NEUTROPHILS NFR BLD AUTO: 81 %
NRBC # BLD AUTO: 0 10E3/UL
NRBC BLD AUTO-RTO: 0 /100
PLATELET # BLD AUTO: 69 10E3/UL (ref 150–450)
POTASSIUM SERPL-SCNC: 4.1 MMOL/L (ref 3.4–5.3)
PROT SERPL-MCNC: 6 G/DL (ref 6.4–8.3)
RBC # BLD AUTO: 2.28 10E6/UL (ref 4.4–5.9)
SODIUM SERPL-SCNC: 141 MMOL/L (ref 135–145)
UNIT ABO/RH: NORMAL
UNIT NUMBER: NORMAL
UNIT STATUS: NORMAL
UNIT TYPE ISBT: 600
WBC # BLD AUTO: 2.9 10E3/UL (ref 4–11)

## 2024-10-16 PROCEDURE — 86901 BLOOD TYPING SEROLOGIC RH(D): CPT | Performed by: INTERNAL MEDICINE

## 2024-10-16 PROCEDURE — 36591 DRAW BLOOD OFF VENOUS DEVICE: CPT | Performed by: INTERNAL MEDICINE

## 2024-10-16 PROCEDURE — 80053 COMPREHEN METABOLIC PANEL: CPT | Performed by: INTERNAL MEDICINE

## 2024-10-16 PROCEDURE — 96413 CHEMO IV INFUSION 1 HR: CPT

## 2024-10-16 PROCEDURE — 250N000011 HC RX IP 250 OP 636: Performed by: INTERNAL MEDICINE

## 2024-10-16 PROCEDURE — 96375 TX/PRO/DX INJ NEW DRUG ADDON: CPT

## 2024-10-16 PROCEDURE — 85025 COMPLETE CBC W/AUTO DIFF WBC: CPT | Performed by: INTERNAL MEDICINE

## 2024-10-16 PROCEDURE — 86900 BLOOD TYPING SEROLOGIC ABO: CPT | Performed by: INTERNAL MEDICINE

## 2024-10-16 PROCEDURE — 99442 PR PHYSICIAN TELEPHONE EVALUATION 11-20 MIN: CPT | Mod: 93 | Performed by: INTERNAL MEDICINE

## 2024-10-16 PROCEDURE — 258N000003 HC RX IP 258 OP 636: Performed by: INTERNAL MEDICINE

## 2024-10-16 PROCEDURE — 96367 TX/PROPH/DG ADDL SEQ IV INF: CPT

## 2024-10-16 PROCEDURE — 96417 CHEMO IV INFUS EACH ADDL SEQ: CPT

## 2024-10-16 RX ORDER — HEPARIN SODIUM (PORCINE) LOCK FLUSH IV SOLN 100 UNIT/ML 100 UNIT/ML
5 SOLUTION INTRAVENOUS
Status: CANCELLED | OUTPATIENT
Start: 2024-10-18

## 2024-10-16 RX ORDER — DIPHENHYDRAMINE HYDROCHLORIDE 50 MG/ML
50 INJECTION INTRAMUSCULAR; INTRAVENOUS
Status: CANCELLED
Start: 2024-10-18

## 2024-10-16 RX ORDER — ALBUTEROL SULFATE 90 UG/1
1-2 INHALANT RESPIRATORY (INHALATION)
Status: CANCELLED
Start: 2024-10-16

## 2024-10-16 RX ORDER — METHYLPREDNISOLONE SODIUM SUCCINATE 125 MG/2ML
125 INJECTION INTRAMUSCULAR; INTRAVENOUS
Status: CANCELLED
Start: 2024-10-17

## 2024-10-16 RX ORDER — DIPHENHYDRAMINE HYDROCHLORIDE 50 MG/ML
50 INJECTION INTRAMUSCULAR; INTRAVENOUS
Status: CANCELLED
Start: 2024-10-17

## 2024-10-16 RX ORDER — PALONOSETRON 0.05 MG/ML
0.25 INJECTION, SOLUTION INTRAVENOUS ONCE
Status: COMPLETED | OUTPATIENT
Start: 2024-10-16 | End: 2024-10-16

## 2024-10-16 RX ORDER — METHYLPREDNISOLONE SODIUM SUCCINATE 125 MG/2ML
125 INJECTION INTRAMUSCULAR; INTRAVENOUS
Status: CANCELLED
Start: 2024-10-18

## 2024-10-16 RX ORDER — ALBUTEROL SULFATE 0.83 MG/ML
2.5 SOLUTION RESPIRATORY (INHALATION)
Status: CANCELLED | OUTPATIENT
Start: 2024-10-16

## 2024-10-16 RX ORDER — EPINEPHRINE 1 MG/ML
0.3 INJECTION, SOLUTION INTRAMUSCULAR; SUBCUTANEOUS EVERY 5 MIN PRN
Status: CANCELLED | OUTPATIENT
Start: 2024-10-16

## 2024-10-16 RX ORDER — EPINEPHRINE 1 MG/ML
0.3 INJECTION, SOLUTION INTRAMUSCULAR; SUBCUTANEOUS EVERY 5 MIN PRN
Status: CANCELLED | OUTPATIENT
Start: 2024-10-17

## 2024-10-16 RX ORDER — EPINEPHRINE 1 MG/ML
0.3 INJECTION, SOLUTION INTRAMUSCULAR; SUBCUTANEOUS EVERY 5 MIN PRN
Status: CANCELLED | OUTPATIENT
Start: 2024-10-18

## 2024-10-16 RX ORDER — HEPARIN SODIUM,PORCINE 10 UNIT/ML
5-20 VIAL (ML) INTRAVENOUS DAILY PRN
Status: CANCELLED | OUTPATIENT
Start: 2024-10-16

## 2024-10-16 RX ORDER — LORAZEPAM 2 MG/ML
0.5 INJECTION INTRAMUSCULAR EVERY 4 HOURS PRN
Status: CANCELLED | OUTPATIENT
Start: 2024-10-16

## 2024-10-16 RX ORDER — HEPARIN SODIUM,PORCINE 10 UNIT/ML
5-20 VIAL (ML) INTRAVENOUS DAILY PRN
Status: CANCELLED | OUTPATIENT
Start: 2024-10-18

## 2024-10-16 RX ORDER — PALONOSETRON 0.05 MG/ML
0.25 INJECTION, SOLUTION INTRAVENOUS ONCE
Status: CANCELLED | OUTPATIENT
Start: 2024-10-16

## 2024-10-16 RX ORDER — ALBUTEROL SULFATE 0.83 MG/ML
2.5 SOLUTION RESPIRATORY (INHALATION)
Status: CANCELLED | OUTPATIENT
Start: 2024-10-18

## 2024-10-16 RX ORDER — MEPERIDINE HYDROCHLORIDE 25 MG/ML
25 INJECTION INTRAMUSCULAR; INTRAVENOUS; SUBCUTANEOUS EVERY 30 MIN PRN
Status: CANCELLED | OUTPATIENT
Start: 2024-10-17

## 2024-10-16 RX ORDER — HEPARIN SODIUM (PORCINE) LOCK FLUSH IV SOLN 100 UNIT/ML 100 UNIT/ML
5 SOLUTION INTRAVENOUS
Status: CANCELLED | OUTPATIENT
Start: 2024-10-17

## 2024-10-16 RX ORDER — ALBUTEROL SULFATE 0.83 MG/ML
2.5 SOLUTION RESPIRATORY (INHALATION)
Status: CANCELLED | OUTPATIENT
Start: 2024-10-17

## 2024-10-16 RX ORDER — HEPARIN SODIUM,PORCINE 10 UNIT/ML
5-20 VIAL (ML) INTRAVENOUS DAILY PRN
Status: CANCELLED | OUTPATIENT
Start: 2024-10-17

## 2024-10-16 RX ORDER — LORAZEPAM 2 MG/ML
0.5 INJECTION INTRAMUSCULAR EVERY 4 HOURS PRN
Status: CANCELLED | OUTPATIENT
Start: 2024-10-17

## 2024-10-16 RX ORDER — HEPARIN SODIUM,PORCINE 10 UNIT/ML
5-20 VIAL (ML) INTRAVENOUS DAILY PRN
Status: DISCONTINUED | OUTPATIENT
Start: 2024-10-16 | End: 2024-10-16 | Stop reason: HOSPADM

## 2024-10-16 RX ORDER — DIPHENHYDRAMINE HYDROCHLORIDE 50 MG/ML
50 INJECTION INTRAMUSCULAR; INTRAVENOUS
Status: CANCELLED
Start: 2024-10-16

## 2024-10-16 RX ORDER — ALBUTEROL SULFATE 90 UG/1
1-2 INHALANT RESPIRATORY (INHALATION)
Status: CANCELLED
Start: 2024-10-18

## 2024-10-16 RX ORDER — MEPERIDINE HYDROCHLORIDE 25 MG/ML
25 INJECTION INTRAMUSCULAR; INTRAVENOUS; SUBCUTANEOUS EVERY 30 MIN PRN
Status: CANCELLED | OUTPATIENT
Start: 2024-10-16

## 2024-10-16 RX ORDER — LORAZEPAM 2 MG/ML
0.5 INJECTION INTRAMUSCULAR EVERY 4 HOURS PRN
Status: CANCELLED | OUTPATIENT
Start: 2024-10-18

## 2024-10-16 RX ORDER — HEPARIN SODIUM (PORCINE) LOCK FLUSH IV SOLN 100 UNIT/ML 100 UNIT/ML
5 SOLUTION INTRAVENOUS
Status: CANCELLED | OUTPATIENT
Start: 2024-10-16

## 2024-10-16 RX ORDER — METHYLPREDNISOLONE SODIUM SUCCINATE 125 MG/2ML
125 INJECTION INTRAMUSCULAR; INTRAVENOUS
Status: CANCELLED
Start: 2024-10-16

## 2024-10-16 RX ORDER — MEPERIDINE HYDROCHLORIDE 25 MG/ML
25 INJECTION INTRAMUSCULAR; INTRAVENOUS; SUBCUTANEOUS EVERY 30 MIN PRN
Status: CANCELLED | OUTPATIENT
Start: 2024-10-18

## 2024-10-16 RX ORDER — ALBUTEROL SULFATE 90 UG/1
1-2 INHALANT RESPIRATORY (INHALATION)
Status: CANCELLED
Start: 2024-10-17

## 2024-10-16 RX ADMIN — SODIUM CHLORIDE 175 MG: 9 INJECTION, SOLUTION INTRAVENOUS at 15:24

## 2024-10-16 RX ADMIN — FOSAPREPITANT: 150 INJECTION, POWDER, LYOPHILIZED, FOR SOLUTION INTRAVENOUS at 14:22

## 2024-10-16 RX ADMIN — HEPARIN, PORCINE (PF) 10 UNIT/ML INTRAVENOUS SYRINGE 5 ML: at 16:32

## 2024-10-16 RX ADMIN — PALONOSETRON HYDROCHLORIDE 0.25 MG: 0.25 INJECTION INTRAVENOUS at 14:22

## 2024-10-16 RX ADMIN — CARBOPLATIN 400 MG: 10 INJECTION INTRAVENOUS at 14:50

## 2024-10-16 ASSESSMENT — PAIN SCALES - GENERAL: PAINLEVEL: NO PAIN (0)

## 2024-10-16 NOTE — PROGRESS NOTES
Infusion Nursing Note:  Pascual Perea presents today for C4D1 carbo and etoposide.    Patient seen by provider today: Yes: Dr. Butler this am   present during visit today: Not Applicable.    Note: Patient with 3 more radiation treatments left. Reviewed CBC with Dr. Butler; proceed with chemo today despite platelet count of 69,000 and give one unit of prbc for hgb 6.9. Transfusion will be tomorrow during infusion appt.      Intravenous Access:  Labs drawn without difficulty.  Implanted Port.    Treatment Conditions:  Lab Results   Component Value Date    HGB 6.9 (LL) 10/16/2024    WBC 2.9 (L) 10/16/2024    ANEU 2.6 04/13/2020    ANEUTAUTO 2.4 10/16/2024    PLT 69 (L) 10/16/2024        Lab Results   Component Value Date     10/16/2024    POTASSIUM 4.1 10/16/2024    MAG 2.0 06/29/2023    CR 1.31 (H) 10/16/2024    STARR 9.1 10/16/2024    BILITOTAL 0.6 10/16/2024    ALBUMIN 4.0 10/16/2024    ALT 14 10/16/2024    AST 19 10/16/2024       Results reviewed, labs MET treatment parameters, ok to proceed with treatment.      Post Infusion Assessment:  Patient tolerated infusion without incident.  Blood return noted pre and post infusion.  Site patent and intact, free from redness, edema or discomfort.  No evidence of extravasations.       Discharge Plan:   AVS to patient via MYCHART.  Patient will return 10/17 for next appointment.   Patient discharged in stable condition accompanied by: daughter.  Departure Mode: Ambulatory.      Earnest Amato RN

## 2024-10-16 NOTE — PROGRESS NOTES
Virtual Visit Details    Type of service:  Telephone Visit   Phone call duration: 12 minutes   Originating Location (pt. Location): Home    Distant Location (provider location):  On-site    ONCOLOGY HISTORY: Mr. 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     1.  CT neck on 06/20/2024 revealed 2.8 cm mass in left parotid gland.  Also revealed multiple enlarged left-sided cervical lymph node.  2.  Ultrasound-guided biopsy of left cervical mass on 06/28/2024 revealed METASTATIC HIGH-GRADE NEUROENDOCRINE CARCINOMA, LARGE CELL TYPE, at least 1.2 cm in linear extent, extending to biopsy margins.  3.  PET scan on 07/19/2024 revealed hypermetabolic left parotid mass and multiple hypermetabolic left cervical lymphadenopathy.  Severely stenotic left internal jugular vein secondary to mass effect.  4. He was seen by ENT at [facility].  Flexible fiberoptic laryngoscopy on 07/15/2024 was normal.  5. Case discussed at tumor board at [facility].  Tumor board recommendation was to treat it as small cell lung cancer.  Recommendation was to give him 4 cycle of platinum and etoposide and add radiation with cycle 2 or 3.  6. Carboplatin and etoposide started on 08/14/2024 (carboplatin chosen because of CKD, hearing loss and his age).     Subjective:   Mr. 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 He is on chemotherapy with carboplatin and etoposide and radiation.  Cancer is responding to treatment.  CT scan on 09/12/2024 revealed improvement in malignancy.    Patient will be starting cycle 4 of chemotherapy today.  He will be completing radiation next Monday.  Overall he is tolerating it well    He has mild generalized weakness.  He is able  to do all his activities.  No headache.  No dizziness.  No worsening of hearing.  Some pain in the throat.  He is able to swallow.  No dysphagia.  Mass in the neck has continued to decrease in size.  No chest pain.  No shortness of breath at rest.  Some nausea.  No vomiting.  Appetite is fairly good.  No urinary complaint.  No bowel problem.  He has mild numbness and tingling in the extremities which are stable.     Exam:  He is alert and oriented x 3.  Not in any distress.  Rest of the system is not examined as this is a telephone visit.     Assessment:  1.  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  Disease is responding.  2.  Chronic kidney disease.    3.  Intermittent chronic hypercalcemia.    4.  Sensorineural hearing loss.  5.  Grade 1 peripheral neuropathy.  6.  Normocytic anemia secondary to chemoradiation, renal disease and anemia of chronic disease.  7.  Throat pain secondary to radiation.     Plan:  -Continue chemotherapy and radiation.  -See me in 2 weeks with labs.     Discussion:  1.  Patient's overall condition is stable.  He has one more cycle of chemotherapy which he will be starting today.  He has few more days of radiation.    Overall patient is stable.  He will continue on chemotherapy and radiation.    He is going to have labs done later today.  Will review it.    2.  Patient has anemia.  He received transfusion on 09/26/2024.  It made him feel better.    Explained to the patient that he can again have worsening of anemia with chemotherapy and radiation.  Will monitor CBC.  He may again require transfusion.    3.  He has pain in the throat.  This is from radiation. It can get worse.  Advised him to call us if he has problems swallowing.  He may need IV fluid.    4.  I will see him in about 2 weeks time for follow-up.  Will recheck labs at that time.  Will plan on  getting PET scan in about 5 to 6 weeks time.  Advised him to call us with any questions or concerns.     Total telephone visit time of 12 minutes.  Time spent in today's visit, review of chart/investigations today, monitoring for toxicity of high risk drugs today and documentation today.

## 2024-10-16 NOTE — NURSING NOTE
Current patient location: [address]  [facility] [address]    Is the patient currently in the state of [place]? YES    Visit mode:TELEPHONE    If the visit is dropped, the patient can be reconnected by: TELEPHONE VISIT: Phone number:   Telephone Information:   Mobile [phone]       Will anyone else be joining the visit? NO  (If patient encounters technical issues they should call [phone]    Are changes needed to the allergy or medication list? No, Pt stated no changes to allergies, and Pt stated no med changes    Are refills needed on medications prescribed by this physician? NO    Rooming Documentation:  Questionnaire(s) completed    Reason for visit: RECHECK (Return CCSL)    Charla SONG

## 2024-10-16 NOTE — LETTER
10/16/2024      Pascual Perea  [address]  [facility] [address]      Dear Colleague,    Thank you for referring your patient, Pascual Perea, to the [facility]. Please see a copy of my visit note below.    Virtual Visit Details    Type of service:  Telephone Visit   Phone call duration: 12 minutes   Originating Location (pt. Location): Home    Distant Location (provider location):  On-site    ONCOLOGY HISTORY: Mr. 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     1.  CT neck on 06/20/2024 revealed 2.8 cm mass in left parotid gland.  Also revealed multiple enlarged left-sided cervical lymph node.  2.  Ultrasound-guided biopsy of left cervical mass on 06/28/2024 revealed METASTATIC HIGH-GRADE NEUROENDOCRINE CARCINOMA, LARGE CELL TYPE, at least 1.2 cm in linear extent, extending to biopsy margins.  3.  PET scan on 07/19/2024 revealed hypermetabolic left parotid mass and multiple hypermetabolic left cervical lymphadenopathy.  Severely stenotic left internal jugular vein secondary to mass effect.  4. He was seen by ENT at [facility].  Flexible fiberoptic laryngoscopy on 07/15/2024 was normal.  5. Case discussed at tumor board at [facility].  Tumor board recommendation was to treat it as small cell lung cancer.  Recommendation was to give him 4 cycle of platinum and etoposide and add radiation with cycle 2 or 3.  6. Carboplatin and etoposide started on 08/14/2024 (carboplatin chosen because of CKD, hearing loss and his age).     Subjective:   Mr. 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 He is on chemotherapy with carboplatin and etoposide and radiation.  Cancer is responding to  treatment.  CT scan on 09/12/2024 revealed improvement in malignancy.    Patient will be starting cycle 4 of chemotherapy today.  He will be completing radiation next Monday.  Overall he is tolerating it well    He has mild generalized weakness.  He is able to do all his activities.  No headache.  No dizziness.  No worsening of hearing.  Some pain in the throat.  He is able to swallow.  No dysphagia.  Mass in the neck has continued to decrease in size.  No chest pain.  No shortness of breath at rest.  Some nausea.  No vomiting.  Appetite is fairly good.  No urinary complaint.  No bowel problem.  He has mild numbness and tingling in the extremities which are stable.     Exam:  He is alert and oriented x 3.  Not in any distress.  Rest of the system is not examined as this is a telephone visit.     Assessment:  1.  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  Disease is responding.  2.  Chronic kidney disease.    3.  Intermittent chronic hypercalcemia.    4.  Sensorineural hearing loss.  5.  Grade 1 peripheral neuropathy.  6.  Normocytic anemia secondary to chemoradiation, renal disease and anemia of chronic disease.  7.  Throat pain secondary to radiation.     Plan:  -Continue chemotherapy and radiation.  -See me in 2 weeks with labs.     Discussion:  1.  Patient's overall condition is stable.  He has one more cycle of chemotherapy which he will be starting today.  He has few more days of radiation.    Overall patient is stable.  He will continue on chemotherapy and radiation.    He is going to have labs done later today.  Will review it.    2.  Patient has anemia.  He received transfusion on 09/26/2024.  It made him feel better.    Explained to the patient that he can again have worsening of anemia with chemotherapy and radiation.  Will monitor CBC.  He may again require transfusion.    3.   He has pain in the throat.  This is from radiation. It can get worse.  Advised him to call us if he has problems swallowing.  He may need IV fluid.    4.  I will see him in about 2 weeks time for follow-up.  Will recheck labs at that time.  Will plan on getting PET scan in about 5 to 6 weeks time.  Advised him to call us with any questions or concerns.     Total telephone visit time of 12 minutes.  Time spent in today's visit, review of chart/investigations today, monitoring for toxicity of high risk drugs today and documentation today.      Again, thank you for allowing me to participate in the care of your patient.        Sincerely,        Florencia Butler MD

## 2024-10-16 NOTE — LETTER
10/16/2024      Pascual Perea  [address]  [facility] [address]      Dear Colleague,    Thank you for referring your patient, Pascual Perea, to the [facility]. Please see a copy of my visit note below.    Virtual Visit Details    Type of service:  Telephone Visit   Phone call duration: 12 minutes   Originating Location (pt. Location): Home    Distant Location (provider location):  On-site    ONCOLOGY HISTORY: Mr. 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     1.  CT neck on 06/20/2024 revealed 2.8 cm mass in left parotid gland.  Also revealed multiple enlarged left-sided cervical lymph node.  2.  Ultrasound-guided biopsy of left cervical mass on 06/28/2024 revealed METASTATIC HIGH-GRADE NEUROENDOCRINE CARCINOMA, LARGE CELL TYPE, at least 1.2 cm in linear extent, extending to biopsy margins.  3.  PET scan on 07/19/2024 revealed hypermetabolic left parotid mass and multiple hypermetabolic left cervical lymphadenopathy.  Severely stenotic left internal jugular vein secondary to mass effect.  4. He was seen by ENT at [facility].  Flexible fiberoptic laryngoscopy on 07/15/2024 was normal.  5. Case discussed at tumor board at [facility].  Tumor board recommendation was to treat it as small cell lung cancer.  Recommendation was to give him 4 cycle of platinum and etoposide and add radiation with cycle 2 or 3.  6. Carboplatin and etoposide started on 08/14/2024 (carboplatin chosen because of CKD, hearing loss and his age).     Subjective:   Mr. 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 He is on chemotherapy with carboplatin and etoposide and radiation.  Cancer is responding to  treatment.  CT scan on 09/12/2024 revealed improvement in malignancy.    Patient will be starting cycle 4 of chemotherapy today.  He will be completing radiation next Monday.  Overall he is tolerating it well    He has mild generalized weakness.  He is able to do all his activities.  No headache.  No dizziness.  No worsening of hearing.  Some pain in the throat.  He is able to swallow.  No dysphagia.  Mass in the neck has continued to decrease in size.  No chest pain.  No shortness of breath at rest.  Some nausea.  No vomiting.  Appetite is fairly good.  No urinary complaint.  No bowel problem.  He has mild numbness and tingling in the extremities which are stable.     Exam:  He is alert and oriented x 3.  Not in any distress.  Rest of the system is not examined as this is a telephone visit.     Assessment:  1.  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  Disease is responding.  2.  Chronic kidney disease.    3.  Intermittent chronic hypercalcemia.    4.  Sensorineural hearing loss.  5.  Grade 1 peripheral neuropathy.  6.  Normocytic anemia secondary to chemoradiation, renal disease and anemia of chronic disease.  7.  Throat pain secondary to radiation.     Plan:  -Continue chemotherapy and radiation.  -See me in 2 weeks with labs.     Discussion:  1.  Patient's overall condition is stable.  He has one more cycle of chemotherapy which he will be starting today.  He has few more days of radiation.    Overall patient is stable.  He will continue on chemotherapy and radiation.    He is going to have labs done later today.  Will review it.    2.  Patient has anemia.  He received transfusion on 09/26/2024.  It made him feel better.    Explained to the patient that he can again have worsening of anemia with chemotherapy and radiation.  Will monitor CBC.  He may again require transfusion.    3.   He has pain in the throat.  This is from radiation. It can get worse.  Advised him to call us if he has problems swallowing.  He may need IV fluid.    4.  I will see him in about 2 weeks time for follow-up.  Will recheck labs at that time.  Will plan on getting PET scan in about 5 to 6 weeks time.  Advised him to call us with any questions or concerns.     Total telephone visit time of 12 minutes.  Time spent in today's visit, review of chart/investigations today, monitoring for toxicity of high risk drugs today and documentation today.      Again, thank you for allowing me to participate in the care of your patient.        Sincerely,        Florencia Butler MD

## 2024-10-17 ENCOUNTER — INFUSION THERAPY VISIT (OUTPATIENT)
Dept: INFUSION THERAPY | Facility: CLINIC | Age: 84
End: 2024-10-17
Attending: INTERNAL MEDICINE
Payer: COMMERCIAL

## 2024-10-17 VITALS
RESPIRATION RATE: 18 BRPM | TEMPERATURE: 98.3 F | SYSTOLIC BLOOD PRESSURE: 117 MMHG | OXYGEN SATURATION: 97 % | HEART RATE: 61 BPM | DIASTOLIC BLOOD PRESSURE: 56 MMHG

## 2024-10-17 DIAGNOSIS — D64.9 ANEMIA, UNSPECIFIED TYPE: Primary | ICD-10-CM

## 2024-10-17 DIAGNOSIS — T45.1X5A CHEMOTHERAPY-INDUCED NEUTROPENIA (H): ICD-10-CM

## 2024-10-17 DIAGNOSIS — D70.1 CHEMOTHERAPY-INDUCED NEUTROPENIA (H): ICD-10-CM

## 2024-10-17 DIAGNOSIS — C7A.1 MALIGNANT POORLY DIFFERENTIATED NEUROENDOCRINE CARCINOMA (H): ICD-10-CM

## 2024-10-17 DIAGNOSIS — C7A.1 HIGH GRADE NEUROENDOCRINE CARCINOMA (H): ICD-10-CM

## 2024-10-17 LAB
BASOPHILS # BLD AUTO: 0 10E3/UL (ref 0–0.2)
BASOPHILS NFR BLD AUTO: 0 %
EOSINOPHIL # BLD AUTO: 0 10E3/UL (ref 0–0.7)
EOSINOPHIL NFR BLD AUTO: 0 %
ERYTHROCYTE [DISTWIDTH] IN BLOOD BY AUTOMATED COUNT: 21.2 % (ref 10–15)
HCT VFR BLD AUTO: 22.5 % (ref 40–53)
HGB BLD-MCNC: 7 G/DL (ref 13.3–17.7)
IMM GRANULOCYTES # BLD: 0 10E3/UL
IMM GRANULOCYTES NFR BLD: 0 %
LYMPHOCYTES # BLD AUTO: 0.2 10E3/UL (ref 0.8–5.3)
LYMPHOCYTES NFR BLD AUTO: 3 %
MCH RBC QN AUTO: 29.9 PG (ref 26.5–33)
MCHC RBC AUTO-ENTMCNC: 31.1 G/DL (ref 31.5–36.5)
MCV RBC AUTO: 96 FL (ref 78–100)
MONOCYTES # BLD AUTO: 0.5 10E3/UL (ref 0–1.3)
MONOCYTES NFR BLD AUTO: 10 %
NEUTROPHILS # BLD AUTO: 4.3 10E3/UL (ref 1.6–8.3)
NEUTROPHILS NFR BLD AUTO: 87 %
NRBC # BLD AUTO: 0 10E3/UL
NRBC BLD AUTO-RTO: 0 /100
PLATELET # BLD AUTO: 105 10E3/UL (ref 150–450)
RBC # BLD AUTO: 2.34 10E6/UL (ref 4.4–5.9)
WBC # BLD AUTO: 5 10E3/UL (ref 4–11)

## 2024-10-17 PROCEDURE — 250N000011 HC RX IP 250 OP 636: Performed by: INTERNAL MEDICINE

## 2024-10-17 PROCEDURE — 36591 DRAW BLOOD OFF VENOUS DEVICE: CPT

## 2024-10-17 PROCEDURE — 96413 CHEMO IV INFUSION 1 HR: CPT

## 2024-10-17 PROCEDURE — 86923 COMPATIBILITY TEST ELECTRIC: CPT | Performed by: INTERNAL MEDICINE

## 2024-10-17 PROCEDURE — 85025 COMPLETE CBC W/AUTO DIFF WBC: CPT | Performed by: INTERNAL MEDICINE

## 2024-10-17 PROCEDURE — 36430 TRANSFUSION BLD/BLD COMPNT: CPT

## 2024-10-17 PROCEDURE — 96375 TX/PRO/DX INJ NEW DRUG ADDON: CPT

## 2024-10-17 PROCEDURE — 96361 HYDRATE IV INFUSION ADD-ON: CPT

## 2024-10-17 PROCEDURE — 258N000003 HC RX IP 258 OP 636: Performed by: INTERNAL MEDICINE

## 2024-10-17 PROCEDURE — P9016 RBC LEUKOCYTES REDUCED: HCPCS | Performed by: INTERNAL MEDICINE

## 2024-10-17 RX ORDER — DEXAMETHASONE SODIUM PHOSPHATE 4 MG/ML
12 INJECTION, SOLUTION INTRA-ARTICULAR; INTRALESIONAL; INTRAMUSCULAR; INTRAVENOUS; SOFT TISSUE ONCE
Status: COMPLETED | OUTPATIENT
Start: 2024-10-17 | End: 2024-10-17

## 2024-10-17 RX ORDER — HEPARIN SODIUM,PORCINE 10 UNIT/ML
5-20 VIAL (ML) INTRAVENOUS DAILY PRN
Status: DISCONTINUED | OUTPATIENT
Start: 2024-10-17 | End: 2024-10-17 | Stop reason: HOSPADM

## 2024-10-17 RX ADMIN — SODIUM CHLORIDE 250 ML: 9 INJECTION, SOLUTION INTRAVENOUS at 12:48

## 2024-10-17 RX ADMIN — DEXAMETHASONE SODIUM PHOSPHATE 12 MG: 4 INJECTION INTRA-ARTICULAR; INTRALESIONAL; INTRAMUSCULAR; INTRAVENOUS; SOFT TISSUE at 12:49

## 2024-10-17 RX ADMIN — HEPARIN, PORCINE (PF) 10 UNIT/ML INTRAVENOUS SYRINGE 5 ML: at 16:14

## 2024-10-17 RX ADMIN — SODIUM CHLORIDE 170 MG: 9 INJECTION, SOLUTION INTRAVENOUS at 13:07

## 2024-10-17 ASSESSMENT — PAIN SCALES - GENERAL: PAINLEVEL: NO PAIN (0)

## 2024-10-17 NOTE — PROGRESS NOTES
Infusion Nursing Note:  Pascual Perea presents today for C4D2 Etoposide/1 unit PRBC.    Patient seen by provider today: No   present during visit today: Not Applicable.    Note: Patient denies any new medical changes since yesterday.      Intravenous Access:  Labs drawn without difficulty.  Implanted Port.    Treatment Conditions:  Lab Results   Component Value Date    HGB 7.0 (L) 10/17/2024    WBC 5.0 10/17/2024    ANEU 2.6 04/13/2020    ANEUTAUTO 4.3 10/17/2024     (L) 10/17/2024        Results reviewed, labs MET treatment parameters, ok to proceed with treatment.  Blood transfusion consent signed 9/25/24.      Post Infusion Assessment:  Patient tolerated infusion without incident.  Blood return noted pre and post infusion.  Site patent and intact, free from redness, edema or discomfort.  No evidence of extravasations.  Port a cath left accessed per protocol for treatment tomorrow.       Discharge Plan:   Discharge instructions reviewed with: Patient and Family.  Patient and/or family verbalized understanding of discharge instructions and all questions answered.  AVS to patient via [employer].  Patient will return 10/18/24 for next appointment.   Patient discharged in stable condition accompanied by: daughter.  Departure Mode: Ambulatory.      Alannah Lombardo RN

## 2024-10-18 ENCOUNTER — INFUSION THERAPY VISIT (OUTPATIENT)
Dept: INFUSION THERAPY | Facility: CLINIC | Age: 84
End: 2024-10-18
Attending: INTERNAL MEDICINE
Payer: COMMERCIAL

## 2024-10-18 VITALS
RESPIRATION RATE: 18 BRPM | DIASTOLIC BLOOD PRESSURE: 61 MMHG | SYSTOLIC BLOOD PRESSURE: 126 MMHG | OXYGEN SATURATION: 97 % | TEMPERATURE: 97.9 F | HEART RATE: 77 BPM

## 2024-10-18 DIAGNOSIS — D70.1 CHEMOTHERAPY-INDUCED NEUTROPENIA (H): ICD-10-CM

## 2024-10-18 DIAGNOSIS — T45.1X5A CHEMOTHERAPY-INDUCED NEUTROPENIA (H): ICD-10-CM

## 2024-10-18 DIAGNOSIS — C7A.1 MALIGNANT POORLY DIFFERENTIATED NEUROENDOCRINE CARCINOMA (H): Primary | ICD-10-CM

## 2024-10-18 DIAGNOSIS — C7A.1 HIGH GRADE NEUROENDOCRINE CARCINOMA (H): Primary | ICD-10-CM

## 2024-10-18 DIAGNOSIS — C7A.1 MALIGNANT POORLY DIFFERENTIATED NEUROENDOCRINE CARCINOMA (H): ICD-10-CM

## 2024-10-18 PROCEDURE — 96375 TX/PRO/DX INJ NEW DRUG ADDON: CPT

## 2024-10-18 PROCEDURE — 250N000011 HC RX IP 250 OP 636: Performed by: INTERNAL MEDICINE

## 2024-10-18 PROCEDURE — 96377 APPLICATON ON-BODY INJECTOR: CPT | Mod: XS | Performed by: INTERNAL MEDICINE

## 2024-10-18 PROCEDURE — 258N000003 HC RX IP 258 OP 636: Performed by: INTERNAL MEDICINE

## 2024-10-18 PROCEDURE — 96413 CHEMO IV INFUSION 1 HR: CPT

## 2024-10-18 PROCEDURE — 96372 THER/PROPH/DIAG INJ SC/IM: CPT | Performed by: INTERNAL MEDICINE

## 2024-10-18 RX ORDER — DEXAMETHASONE SODIUM PHOSPHATE 4 MG/ML
12 INJECTION, SOLUTION INTRA-ARTICULAR; INTRALESIONAL; INTRAMUSCULAR; INTRAVENOUS; SOFT TISSUE ONCE
Status: COMPLETED | OUTPATIENT
Start: 2024-10-18 | End: 2024-10-18

## 2024-10-18 RX ORDER — HEPARIN SODIUM (PORCINE) LOCK FLUSH IV SOLN 100 UNIT/ML 100 UNIT/ML
5 SOLUTION INTRAVENOUS
Status: DISCONTINUED | OUTPATIENT
Start: 2024-10-18 | End: 2024-10-18

## 2024-10-18 RX ADMIN — Medication 5 ML: at 09:38

## 2024-10-18 RX ADMIN — PEGFILGRASTIM 6 MG: KIT SUBCUTANEOUS at 08:39

## 2024-10-18 RX ADMIN — SODIUM CHLORIDE 170 MG: 9 INJECTION, SOLUTION INTRAVENOUS at 08:37

## 2024-10-18 RX ADMIN — DEXAMETHASONE SODIUM PHOSPHATE 12 MG: 4 INJECTION INTRA-ARTICULAR; INTRALESIONAL; INTRAMUSCULAR; INTRAVENOUS; SOFT TISSUE at 08:10

## 2024-10-18 RX ADMIN — SODIUM CHLORIDE 250 ML: 9 INJECTION, SOLUTION INTRAVENOUS at 08:10

## 2024-10-18 ASSESSMENT — PAIN SCALES - GENERAL: PAINLEVEL: NO PAIN (0)

## 2024-10-18 NOTE — PROGRESS NOTES
Infusion Nursing Note:  Pascual Perea presents today for day 3 etoposide, neulasta onpro.    Patient seen by provider today: No   present during visit today: Not Applicable.    Note: Last chemotherapy today, pt reports he has 2 radiation treatments left.  Pt denies any changes to health today.    Intravenous Access:  Implanted Port.    Treatment Conditions:  Lab Results   Component Value Date    HGB 7.0 (L) 10/17/2024    WBC 5.0 10/17/2024    ANEU 2.6 04/13/2020    ANEUTAUTO 4.3 10/17/2024     (L) 10/17/2024        Lab Results   Component Value Date     10/16/2024    POTASSIUM 4.1 10/16/2024    MAG 2.0 06/29/2023    CR 1.31 (H) 10/16/2024    STARR 9.1 10/16/2024    BILITOTAL 0.6 10/16/2024    ALBUMIN 4.0 10/16/2024    ALT 14 10/16/2024    AST 19 10/16/2024       Results reviewed, labs MET treatment parameters, ok to proceed with treatment.  Pt received 1 unit PRBCs yesterday.      Post Infusion Assessment:  Patient tolerated infusion without incident.  Blood return noted pre and post infusion.  Site patent and intact, free from redness, edema or discomfort.  No evidence of extravasations.  Access discontinued per protocol.   ONPRO  Was placed on patient's: right side of abdomen.    Was placed at 0845 AM    Podpal used: Yes    ONPRO injector device Lot number: [id number]    Patient education included: what patient can expect after application, what colored lights mean on the device, when to remove device, when and where to call with questions or issues, all patients questions answered, and that Neulasta administration will occur at 11:45Am on 10/19/24.    Patient tolerated administration well.     Discharge Plan:   Patient declined prescription refills.  Discharge instructions reviewed with: Patient and Family.  Patient and/or family verbalized understanding of discharge instructions and all questions answered.  Copy of AVS reviewed with patient and/or family.  Patient will return 10/30/24 for  labs and BK for next appointment.  Patient discharged in stable condition accompanied by: self and daughter.  Departure Mode: Ambulatory.      Lisa Ivan RN

## 2024-10-18 NOTE — PATIENT INSTRUCTIONS
Your On-body Neulasta Injector was applied to your abdomen at 8:45AM.  At approximately 11:45AM on 10/19, your On-body Injector will beep to let you know your dose delivery will begin in 2 minutes.  Your medication will be delivered over the next 45 minutes.  You can remove your Injector at 12:45PM.  Please make sure your Injector has a solid green light or has turned off prior to removing the device.  Please contact your provider at [phone] with questions or concerns.

## 2024-10-21 ENCOUNTER — TRANSFERRED RECORDS (OUTPATIENT)
Dept: HEALTH INFORMATION MANAGEMENT | Facility: CLINIC | Age: 84
End: 2024-10-21
Payer: COMMERCIAL

## 2024-10-29 LAB
ABO/RH(D): NORMAL
ANTIBODY SCREEN: NEGATIVE
SPECIMEN EXPIRATION DATE: NORMAL

## 2024-10-30 ENCOUNTER — INFUSION THERAPY VISIT (OUTPATIENT)
Dept: INFUSION THERAPY | Facility: CLINIC | Age: 84
End: 2024-10-30
Attending: INTERNAL MEDICINE
Payer: COMMERCIAL

## 2024-10-30 ENCOUNTER — ONCOLOGY VISIT (OUTPATIENT)
Dept: ONCOLOGY | Facility: CLINIC | Age: 84
End: 2024-10-30
Attending: INTERNAL MEDICINE
Payer: COMMERCIAL

## 2024-10-30 ENCOUNTER — DOCUMENTATION ONLY (OUTPATIENT)
Dept: ONCOLOGY | Facility: CLINIC | Age: 84
End: 2024-10-30

## 2024-10-30 ENCOUNTER — TELEPHONE (OUTPATIENT)
Dept: ONCOLOGY | Facility: CLINIC | Age: 84
End: 2024-10-30

## 2024-10-30 ENCOUNTER — HOSPITAL ENCOUNTER (INPATIENT)
Facility: CLINIC | Age: 84
LOS: 2 days | Discharge: HOME | DRG: 682 | End: 2024-11-01
Attending: INTERNAL MEDICINE | Admitting: HOSPITALIST
Payer: COMMERCIAL

## 2024-10-30 VITALS — OXYGEN SATURATION: 99 % | HEART RATE: 96 BPM

## 2024-10-30 VITALS
SYSTOLIC BLOOD PRESSURE: 120 MMHG | WEIGHT: 173 LBS | OXYGEN SATURATION: 95 % | DIASTOLIC BLOOD PRESSURE: 68 MMHG | RESPIRATION RATE: 18 BRPM | HEART RATE: 110 BPM | BODY MASS INDEX: 24.13 KG/M2

## 2024-10-30 DIAGNOSIS — C7A.1 MALIGNANT POORLY DIFFERENTIATED NEUROENDOCRINE CARCINOMA (H): ICD-10-CM

## 2024-10-30 DIAGNOSIS — E03.9 HYPOTHYROIDISM, UNSPECIFIED TYPE: ICD-10-CM

## 2024-10-30 DIAGNOSIS — N18.32 STAGE 3B CHRONIC KIDNEY DISEASE (H): ICD-10-CM

## 2024-10-30 DIAGNOSIS — E78.5 HYPERLIPIDEMIA LDL GOAL <70: ICD-10-CM

## 2024-10-30 DIAGNOSIS — E86.0 DEHYDRATION: ICD-10-CM

## 2024-10-30 DIAGNOSIS — C7A.1 MALIGNANT POORLY DIFFERENTIATED NEUROENDOCRINE CARCINOMA (H): Primary | ICD-10-CM

## 2024-10-30 DIAGNOSIS — G47.33 OSA (OBSTRUCTIVE SLEEP APNEA): ICD-10-CM

## 2024-10-30 DIAGNOSIS — K21.9 GASTROESOPHAGEAL REFLUX DISEASE WITHOUT ESOPHAGITIS: ICD-10-CM

## 2024-10-30 DIAGNOSIS — E55.9 VITAMIN D DEFICIENCY: ICD-10-CM

## 2024-10-30 DIAGNOSIS — C7A.1 HIGH GRADE NEUROENDOCRINE CARCINOMA (H): ICD-10-CM

## 2024-10-30 DIAGNOSIS — R53.1 GENERALIZED WEAKNESS: ICD-10-CM

## 2024-10-30 DIAGNOSIS — I48.91 ATRIAL FIBRILLATION, UNSPECIFIED TYPE (H): ICD-10-CM

## 2024-10-30 DIAGNOSIS — N17.9 AKI (ACUTE KIDNEY INJURY) (H): ICD-10-CM

## 2024-10-30 DIAGNOSIS — Z79.4 TYPE 2 DIABETES MELLITUS WITHOUT COMPLICATION, WITH LONG-TERM CURRENT USE OF INSULIN (H): ICD-10-CM

## 2024-10-30 DIAGNOSIS — E11.9 TYPE 2 DIABETES MELLITUS WITHOUT COMPLICATION, WITH LONG-TERM CURRENT USE OF INSULIN (H): ICD-10-CM

## 2024-10-30 DIAGNOSIS — I10 ESSENTIAL HYPERTENSION, BENIGN: ICD-10-CM

## 2024-10-30 DIAGNOSIS — T45.1X5A CHEMOTHERAPY-INDUCED NEUTROPENIA (H): ICD-10-CM

## 2024-10-30 DIAGNOSIS — M54.50 ACUTE MIDLINE LOW BACK PAIN WITHOUT SCIATICA: ICD-10-CM

## 2024-10-30 DIAGNOSIS — M62.81 GENERALIZED MUSCLE WEAKNESS: ICD-10-CM

## 2024-10-30 DIAGNOSIS — D70.1 CHEMOTHERAPY-INDUCED NEUTROPENIA (H): ICD-10-CM

## 2024-10-30 DIAGNOSIS — N17.9 AKI (ACUTE KIDNEY INJURY) (H): Primary | ICD-10-CM

## 2024-10-30 DIAGNOSIS — T14.8XXA OPEN WOUND: Primary | ICD-10-CM

## 2024-10-30 LAB
ALBUMIN SERPL BCG-MCNC: 3.8 G/DL (ref 3.5–5.2)
ALP SERPL-CCNC: 92 U/L (ref 40–150)
ALT SERPL W P-5'-P-CCNC: 19 U/L (ref 0–70)
ANION GAP SERPL CALCULATED.3IONS-SCNC: 19 MMOL/L (ref 7–15)
AST SERPL W P-5'-P-CCNC: 16 U/L (ref 0–45)
BASOPHILS # BLD AUTO: 0 10E3/UL (ref 0–0.2)
BASOPHILS NFR BLD AUTO: 0 %
BILIRUB SERPL-MCNC: 1 MG/DL
BLD PROD TYP BPU: NORMAL
BLOOD COMPONENT TYPE: NORMAL
BUN SERPL-MCNC: 44.9 MG/DL (ref 8–23)
CALCIUM SERPL-MCNC: 9.8 MG/DL (ref 8.8–10.4)
CHLORIDE SERPL-SCNC: 93 MMOL/L (ref 98–107)
CODING SYSTEM: NORMAL
CREAT SERPL-MCNC: 1.95 MG/DL (ref 0.67–1.17)
CROSSMATCH: NORMAL
EGFRCR SERPLBLD CKD-EPI 2021: 34 ML/MIN/1.73M2
EOSINOPHIL # BLD AUTO: 0 10E3/UL (ref 0–0.7)
EOSINOPHIL NFR BLD AUTO: 0 %
ERYTHROCYTE [DISTWIDTH] IN BLOOD BY AUTOMATED COUNT: 17.5 % (ref 10–15)
GLUCOSE BLDC GLUCOMTR-MCNC: 132 MG/DL (ref 70–99)
GLUCOSE BLDC GLUCOMTR-MCNC: 157 MG/DL (ref 70–99)
GLUCOSE SERPL-MCNC: 466 MG/DL (ref 70–99)
HCO3 SERPL-SCNC: 22 MMOL/L (ref 22–29)
HCT VFR BLD AUTO: 22.1 % (ref 40–53)
HGB BLD-MCNC: 7.4 G/DL (ref 13.3–17.7)
IMM GRANULOCYTES # BLD: 0.1 10E3/UL
IMM GRANULOCYTES NFR BLD: 2 %
ISSUE DATE AND TIME: NORMAL
LACTATE SERPL-SCNC: 2.2 MMOL/L (ref 0.7–2)
LACTATE SERPL-SCNC: 2.5 MMOL/L (ref 0.7–2)
LYMPHOCYTES # BLD AUTO: 0.3 10E3/UL (ref 0.8–5.3)
LYMPHOCYTES NFR BLD AUTO: 6 %
MAGNESIUM SERPL-MCNC: 1.6 MG/DL (ref 1.7–2.3)
MCH RBC QN AUTO: 30.5 PG (ref 26.5–33)
MCHC RBC AUTO-ENTMCNC: 33.5 G/DL (ref 31.5–36.5)
MCV RBC AUTO: 91 FL (ref 78–100)
MONOCYTES # BLD AUTO: 0.3 10E3/UL (ref 0–1.3)
MONOCYTES NFR BLD AUTO: 7 %
NEUTROPHILS # BLD AUTO: 3.8 10E3/UL (ref 1.6–8.3)
NEUTROPHILS NFR BLD AUTO: 85 %
NRBC # BLD AUTO: 0.1 10E3/UL
NRBC BLD AUTO-RTO: 1 /100
PLATELET # BLD AUTO: 26 10E3/UL (ref 150–450)
POTASSIUM SERPL-SCNC: 2.7 MMOL/L (ref 3.4–5.3)
POTASSIUM SERPL-SCNC: 3.2 MMOL/L (ref 3.4–5.3)
PROT SERPL-MCNC: 6.4 G/DL (ref 6.4–8.3)
RBC # BLD AUTO: 2.43 10E6/UL (ref 4.4–5.9)
SODIUM SERPL-SCNC: 134 MMOL/L (ref 135–145)
UNIT ABO/RH: NORMAL
UNIT NUMBER: NORMAL
UNIT STATUS: NORMAL
UNIT TYPE ISBT: 600
WBC # BLD AUTO: 4.5 10E3/UL (ref 4–11)

## 2024-10-30 PROCEDURE — 258N000003 HC RX IP 258 OP 636: Performed by: INTERNAL MEDICINE

## 2024-10-30 PROCEDURE — 82310 ASSAY OF CALCIUM: CPT | Performed by: INTERNAL MEDICINE

## 2024-10-30 PROCEDURE — 258N000003 HC RX IP 258 OP 636: Performed by: HOSPITALIST

## 2024-10-30 PROCEDURE — 99223 1ST HOSP IP/OBS HIGH 75: CPT | Performed by: HOSPITALIST

## 2024-10-30 PROCEDURE — 250N000011 HC RX IP 250 OP 636: Performed by: INTERNAL MEDICINE

## 2024-10-30 PROCEDURE — P9016 RBC LEUKOCYTES REDUCED: HCPCS | Performed by: HOSPITALIST

## 2024-10-30 PROCEDURE — 99215 OFFICE O/P EST HI 40 MIN: CPT | Performed by: INTERNAL MEDICINE

## 2024-10-30 PROCEDURE — 120N000001 HC R&B MED SURG/OB

## 2024-10-30 PROCEDURE — 96360 HYDRATION IV INFUSION INIT: CPT

## 2024-10-30 PROCEDURE — 83605 ASSAY OF LACTIC ACID: CPT | Performed by: INTERNAL MEDICINE

## 2024-10-30 PROCEDURE — 250N000012 HC RX MED GY IP 250 OP 636 PS 637: Performed by: HOSPITALIST

## 2024-10-30 PROCEDURE — 36591 DRAW BLOOD OFF VENOUS DEVICE: CPT

## 2024-10-30 PROCEDURE — 86923 COMPATIBILITY TEST ELECTRIC: CPT | Performed by: HOSPITALIST

## 2024-10-30 PROCEDURE — 83735 ASSAY OF MAGNESIUM: CPT | Performed by: HOSPITALIST

## 2024-10-30 PROCEDURE — 250N000013 HC RX MED GY IP 250 OP 250 PS 637: Performed by: HOSPITALIST

## 2024-10-30 PROCEDURE — 85025 COMPLETE CBC W/AUTO DIFF WBC: CPT | Performed by: INTERNAL MEDICINE

## 2024-10-30 PROCEDURE — G0463 HOSPITAL OUTPT CLINIC VISIT: HCPCS | Performed by: INTERNAL MEDICINE

## 2024-10-30 PROCEDURE — G2211 COMPLEX E/M VISIT ADD ON: HCPCS | Performed by: INTERNAL MEDICINE

## 2024-10-30 PROCEDURE — 999N000157 HC STATISTIC RCP TIME EA 10 MIN

## 2024-10-30 PROCEDURE — 84132 ASSAY OF SERUM POTASSIUM: CPT | Performed by: HOSPITALIST

## 2024-10-30 PROCEDURE — 86900 BLOOD TYPING SEROLOGIC ABO: CPT | Performed by: INTERNAL MEDICINE

## 2024-10-30 PROCEDURE — 86901 BLOOD TYPING SEROLOGIC RH(D): CPT | Performed by: INTERNAL MEDICINE

## 2024-10-30 RX ORDER — HEPARIN SODIUM (PORCINE) LOCK FLUSH IV SOLN 100 UNIT/ML 100 UNIT/ML
5 SOLUTION INTRAVENOUS
Status: CANCELLED | OUTPATIENT
Start: 2024-10-30

## 2024-10-30 RX ORDER — POTASSIUM CHLORIDE 1500 MG/1
20 TABLET, EXTENDED RELEASE ORAL ONCE
Status: COMPLETED | OUTPATIENT
Start: 2024-10-30 | End: 2024-10-30

## 2024-10-30 RX ORDER — HEPARIN SODIUM,PORCINE 10 UNIT/ML
5-20 VIAL (ML) INTRAVENOUS DAILY PRN
Status: DISCONTINUED | OUTPATIENT
Start: 2024-10-30 | End: 2024-10-30 | Stop reason: HOSPADM

## 2024-10-30 RX ORDER — TORSEMIDE 20 MG/1
20 TABLET ORAL DAILY
Status: DISCONTINUED | OUTPATIENT
Start: 2024-10-31 | End: 2024-11-01 | Stop reason: HOSPADM

## 2024-10-30 RX ORDER — HEPARIN SODIUM,PORCINE 10 UNIT/ML
5-10 VIAL (ML) INTRAVENOUS EVERY 24 HOURS
Status: DISCONTINUED | OUTPATIENT
Start: 2024-10-30 | End: 2024-11-01 | Stop reason: HOSPADM

## 2024-10-30 RX ORDER — NICOTINE POLACRILEX 4 MG
15-30 LOZENGE BUCCAL
Status: DISCONTINUED | OUTPATIENT
Start: 2024-10-30 | End: 2024-11-01 | Stop reason: HOSPADM

## 2024-10-30 RX ORDER — ATORVASTATIN CALCIUM 40 MG/1
40 TABLET, FILM COATED ORAL DAILY
Status: DISCONTINUED | OUTPATIENT
Start: 2024-10-31 | End: 2024-11-01 | Stop reason: HOSPADM

## 2024-10-30 RX ORDER — ACETAMINOPHEN 325 MG/1
650 TABLET ORAL EVERY 4 HOURS PRN
Status: DISCONTINUED | OUTPATIENT
Start: 2024-10-30 | End: 2024-11-01 | Stop reason: HOSPADM

## 2024-10-30 RX ORDER — TORSEMIDE 20 MG/1
20 TABLET ORAL DAILY
Status: ON HOLD | COMMUNITY
End: 2024-11-01

## 2024-10-30 RX ORDER — CALCIUM CARBONATE 500 MG/1
1000 TABLET, CHEWABLE ORAL 4 TIMES DAILY PRN
Status: DISCONTINUED | OUTPATIENT
Start: 2024-10-30 | End: 2024-11-01 | Stop reason: HOSPADM

## 2024-10-30 RX ORDER — HEPARIN SODIUM,PORCINE 10 UNIT/ML
5-10 VIAL (ML) INTRAVENOUS
Status: DISCONTINUED | OUTPATIENT
Start: 2024-10-30 | End: 2024-11-01 | Stop reason: HOSPADM

## 2024-10-30 RX ORDER — SODIUM CHLORIDE 9 MG/ML
INJECTION, SOLUTION INTRAVENOUS CONTINUOUS
Status: DISCONTINUED | OUTPATIENT
Start: 2024-10-30 | End: 2024-10-31

## 2024-10-30 RX ORDER — ISOSORBIDE MONONITRATE 30 MG/1
30 TABLET, EXTENDED RELEASE ORAL DAILY
Status: DISCONTINUED | OUTPATIENT
Start: 2024-10-31 | End: 2024-11-01 | Stop reason: HOSPADM

## 2024-10-30 RX ORDER — NITROGLYCERIN 0.4 MG/1
0.4 TABLET SUBLINGUAL EVERY 5 MIN PRN
Status: DISCONTINUED | OUTPATIENT
Start: 2024-10-30 | End: 2024-11-01 | Stop reason: HOSPADM

## 2024-10-30 RX ORDER — HEPARIN SODIUM,PORCINE 10 UNIT/ML
5-20 VIAL (ML) INTRAVENOUS DAILY PRN
Status: CANCELLED | OUTPATIENT
Start: 2024-10-30

## 2024-10-30 RX ORDER — ONDANSETRON 2 MG/ML
4 INJECTION INTRAMUSCULAR; INTRAVENOUS EVERY 6 HOURS PRN
Status: DISCONTINUED | OUTPATIENT
Start: 2024-10-30 | End: 2024-11-01 | Stop reason: HOSPADM

## 2024-10-30 RX ORDER — HEPARIN SODIUM (PORCINE) LOCK FLUSH IV SOLN 100 UNIT/ML 100 UNIT/ML
5-10 SOLUTION INTRAVENOUS
Status: DISCONTINUED | OUTPATIENT
Start: 2024-10-30 | End: 2024-11-01 | Stop reason: HOSPADM

## 2024-10-30 RX ORDER — AMOXICILLIN 250 MG
2 CAPSULE ORAL 2 TIMES DAILY PRN
Status: DISCONTINUED | OUTPATIENT
Start: 2024-10-30 | End: 2024-11-01 | Stop reason: HOSPADM

## 2024-10-30 RX ORDER — LIDOCAINE 40 MG/G
CREAM TOPICAL
Status: DISCONTINUED | OUTPATIENT
Start: 2024-10-30 | End: 2024-11-01 | Stop reason: HOSPADM

## 2024-10-30 RX ORDER — ONDANSETRON 4 MG/1
4 TABLET, ORALLY DISINTEGRATING ORAL EVERY 6 HOURS PRN
Status: DISCONTINUED | OUTPATIENT
Start: 2024-10-30 | End: 2024-11-01 | Stop reason: HOSPADM

## 2024-10-30 RX ORDER — FENOFIBRATE 54 MG/1
54 TABLET ORAL
Status: DISCONTINUED | OUTPATIENT
Start: 2024-10-31 | End: 2024-11-01 | Stop reason: HOSPADM

## 2024-10-30 RX ORDER — ACETAMINOPHEN 650 MG/1
650 SUPPOSITORY RECTAL EVERY 4 HOURS PRN
Status: DISCONTINUED | OUTPATIENT
Start: 2024-10-30 | End: 2024-11-01 | Stop reason: HOSPADM

## 2024-10-30 RX ORDER — AMOXICILLIN 250 MG
1 CAPSULE ORAL 2 TIMES DAILY PRN
Status: DISCONTINUED | OUTPATIENT
Start: 2024-10-30 | End: 2024-11-01 | Stop reason: HOSPADM

## 2024-10-30 RX ORDER — DEXTROSE MONOHYDRATE 25 G/50ML
25-50 INJECTION, SOLUTION INTRAVENOUS
Status: DISCONTINUED | OUTPATIENT
Start: 2024-10-30 | End: 2024-11-01 | Stop reason: HOSPADM

## 2024-10-30 RX ORDER — POTASSIUM CHLORIDE 1500 MG/1
40 TABLET, EXTENDED RELEASE ORAL ONCE
Status: COMPLETED | OUTPATIENT
Start: 2024-10-30 | End: 2024-10-30

## 2024-10-30 RX ADMIN — INSULIN GLARGINE 30 UNITS: 100 INJECTION, SOLUTION SUBCUTANEOUS at 22:01

## 2024-10-30 RX ADMIN — POTASSIUM CHLORIDE 40 MEQ: 1500 TABLET, EXTENDED RELEASE ORAL at 18:17

## 2024-10-30 RX ADMIN — POTASSIUM CHLORIDE 20 MEQ: 1500 TABLET, EXTENDED RELEASE ORAL at 20:11

## 2024-10-30 RX ADMIN — SODIUM CHLORIDE 1000 ML: 9 INJECTION, SOLUTION INTRAVENOUS at 14:37

## 2024-10-30 RX ADMIN — HEPARIN, PORCINE (PF) 10 UNIT/ML INTRAVENOUS SYRINGE 5 ML: at 15:40

## 2024-10-30 RX ADMIN — INSULIN ASPART 1 UNITS: 100 INJECTION, SOLUTION INTRAVENOUS; SUBCUTANEOUS at 18:17

## 2024-10-30 RX ADMIN — SODIUM CHLORIDE: 9 INJECTION, SOLUTION INTRAVENOUS at 17:46

## 2024-10-30 ASSESSMENT — ACTIVITIES OF DAILY LIVING (ADL)
ADLS_ACUITY_SCORE: 0

## 2024-10-30 ASSESSMENT — PAIN SCALES - GENERAL: PAINLEVEL_OUTOF10: NO PAIN (0)

## 2024-10-30 NOTE — LETTER
10/30/2024      Pascual Perea  [address]  [facility] [address]      Dear Colleague,    Thank you for referring your patient, Pascual Perea, to the [facility]. Please see a copy of my visit note below.    ONCOLOGY HISTORY: Mr. 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     1.  CT neck on 06/20/2024 revealed 2.8 cm mass in left parotid gland.  Also revealed multiple enlarged left-sided cervical lymph node.  2.  Ultrasound-guided biopsy of left cervical mass on 06/28/2024 revealed METASTATIC HIGH-GRADE NEUROENDOCRINE CARCINOMA, LARGE CELL TYPE, at least 1.2 cm in linear extent, extending to biopsy margins.  3.  PET scan on 07/19/2024 revealed hypermetabolic left parotid mass and multiple hypermetabolic left cervical lymphadenopathy.  Severely stenotic left internal jugular vein secondary to mass effect.  4. He was seen by ENT at [facility].  Flexible fiberoptic laryngoscopy on 07/15/2024 was normal.  5. Case discussed at tumor board at [facility].  Tumor board recommendation was to treat it as small cell lung cancer.  Recommendation was to give him 4 cycle of platinum and etoposide and add radiation with cycle 2 or 3.  6. Carboplatin and etoposide x 4 cycles between 08/14/2024 and 10/18/2024 (carboplatin chosen because of CKD, hearing loss and his age).  -Radiation to left parotid between 09/09/2024 and 10/21/2024.  6000 cGy in 30 fractions.     Subjective:   Daughter came with the patient.    Mr. 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  He completed 4 cycle of carboplatin and etoposide on 10/18/2024.  He completed radiation on 10/21/2024.  Mass in the left neck has decreased  in size.      Patient has not been feeling well for the last few days.  He has been getting weaker.  He has to rest after walking 10 steps.  He has been having lightheadedness/dizziness.  He gets short of breath on minimal exertion.  Patient has some pain during swallowing from radiation esophagitis.  His oral intake has been decreased.  He has skin breakdown in left side of the neck from radiation.  Patient says that it is improving.  No chest pain.  No shortness of breath at rest.  Some nausea.  No vomiting.  Appetite is decreased.  No urinary complaints.  No diarrhea.  He has mild numbness and tingling in the extremities.  No worsening of neuropathy.    In the clinic when nurses were transferring him, patient almost passed out.     Exam:  He is alert and oriented x 3.  He looked very weak.  Vitals: Reviewed.  Rest of the system is not examined.    Labs: CBC and CMP reviewed.     Assessment:  1.  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  Disease is responding.  2.  Acute kidney injury from dehydration.  3.  Chronic kidney disease.  4.  Normocytic anemia from chemoradiation, renal disease and anemia of chronic disease.  5.  Thrombocytopenia from chemotherapy.  6.  Dehydration secondary to decreased oral intake from radiation esophagitis.  7.  Radiation esophagitis.  8.  Sensorineural hearing loss.  9.  Grade 1 peripheral neuropathy.  10.  Generalized weakness from chemoradiation, anemia, dehydration and other medical problems.  11.  Radiation dermatitis involving left neck.     Plan:  -Will admit him to hospital for IV hydration and blood transfusion.    Discussion:  1.  Patient is very weak.  He almost passed out in the clinic.  His weakness is secondary to dehydration, anemia, chemotherapy, radiation and other medical problems.    Labs were reviewed with him.  Explained to  him there are multiple abnormalities.  He is anemic with hemoglobin of 7.4.  His platelet has decreased to 26.  His creatinine has increased to 1.95.  He has acute kidney injury.    Explained to the patient that he needs IV hydration.  He also needs PRBC transfusion.  Explained to him that we will admit him to hospital for hydration and blood transfusion.  Also patient is very weak.  He is at high risk of falling at home.  Patient agreeable for hospitalization.    While awaiting hospital placement, we will start him on IV fluid normal saline.    2.  Patient has high-grade neuroendocrine carcinoma.  He has completed chemotherapy and radiation.  He has responded to it.  Will plan on getting PET scan in 2 to 3 months time.  We will schedule him after his discharge from hospital.    3.  Daughter had few questions which were all answered.  I will see him after discharge from hospital.  Case discussed with Dr. Grady Javed.     Total visit time of 40 minutes.  Time spent in today's visit, review of chart/investigations today and documentation today.      Again, thank you for allowing me to participate in the care of your patient.        Sincerely,        Florencia Butler MD

## 2024-10-30 NOTE — PHARMACY-ADMISSION MEDICATION HISTORY
Pharmacist Admission Medication History    Admission medication history is complete. The information provided in this note is only as accurate as the sources available at the time of the update.    Information Source(s): Patient and CareEverywhere/SureScripts via in-person    Pertinent Information:   -He reports using about 20 units of  Novolog insulin with meals based on CHO counting - he used 21 units this morning    Changes made to PTA medication list:  Added: None  Deleted: None  Changed: Novolog as noted above, azelastine from scheduled > prn    Allergies reviewed with patient and updates made in EHR: yes    Medication History Completed By: Rebekah Garnica [facility] 10/30/2024 4:20 PM    PTA Med List   Medication Sig Note Last Dose/Taking    apixaban ANTICOAGULANT (ELIQUIS ANTICOAGULANT) 5 MG tablet Take 1 tablet (5 mg) by mouth 2 times daily.  10/30/2024 Morning    atorvastatin (LIPITOR) 40 MG tablet TAKE 1 TABLET (40 MG) BY MOUTH DAILY  10/30/2024 Morning    azelastine 137 MCG/SPRAY SOLN Howard 2 sprays into both nostrils 2 times daily. (Patient taking differently: Spray 2 sprays into both nostrils 2 times daily as needed (runny nose).)  Past Week    fenofibrate micronized (LOFIBRA) 67 MG capsule TAKE 1 CAPSULE BY MOUTH EVERY MORNING BEFORE BREAKFAST  10/30/2024 Morning    Insulin Aspart FlexPen 100 UNIT/ML SOPN Inject 6-10 Units Subcutaneous 3 times daily (with meals) Plus priming (Patient taking differently: Inject subcutaneously 3 times daily (with meals). Plus priming) 10/30/2024: He reports usually ~20 units based on CHO counting 10/30/2024 Morning    insulin glargine (LANTUS SOLOSTAR) 100 UNIT/ML pen INJECT 31 UNITS UNDER THE SKIN EVERY 12 HOURS (Patient taking differently: Inject 30 Units subcutaneously 2 times daily.)  10/30/2024 Morning    isosorbide mononitrate (IMDUR) 30 MG 24 hr tablet Take 1 tablet (30 mg) by mouth daily  10/30/2024 Morning    levothyroxine (SYNTHROID/LEVOTHROID) 125 MCG tablet Take  1 tablet (125 mcg) by mouth daily  10/30/2024 Morning    torsemide (DEMADEX) 20 MG tablet Take 20 mg by mouth daily.  10/30/2024 Morning

## 2024-10-30 NOTE — PROGRESS NOTES
ONCOLOGY HISTORY: Mr. 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     1.  CT neck on 06/20/2024 revealed 2.8 cm mass in left parotid gland.  Also revealed multiple enlarged left-sided cervical lymph node.  2.  Ultrasound-guided biopsy of left cervical mass on 06/28/2024 revealed METASTATIC HIGH-GRADE NEUROENDOCRINE CARCINOMA, LARGE CELL TYPE, at least 1.2 cm in linear extent, extending to biopsy margins.  3.  PET scan on 07/19/2024 revealed hypermetabolic left parotid mass and multiple hypermetabolic left cervical lymphadenopathy.  Severely stenotic left internal jugular vein secondary to mass effect.  4. He was seen by ENT at [facility].  Flexible fiberoptic laryngoscopy on 07/15/2024 was normal.  5. Case discussed at tumor board at [facility].  Tumor board recommendation was to treat it as small cell lung cancer.  Recommendation was to give him 4 cycle of platinum and etoposide and add radiation with cycle 2 or 3.  6. Carboplatin and etoposide x 4 cycles between 08/14/2024 and 10/18/2024 (carboplatin chosen because of CKD, hearing loss and his age).  -Radiation to left parotid between 09/09/2024 and 10/21/2024.  6000 cGy in 30 fractions.     Subjective:   Daughter came with the patient.    Mr. 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  He completed 4 cycle of carboplatin and etoposide on 10/18/2024.  He completed radiation on 10/21/2024.  Mass in the left neck has decreased in size.      Patient has not been feeling well for the last few days.  He has been getting weaker.  He has to rest after walking 10 steps.  He has been having lightheadedness/dizziness.  He gets short of breath on minimal exertion.  Patient has  some pain during swallowing from radiation esophagitis.  His oral intake has been decreased.  He has skin breakdown in left side of the neck from radiation.  Patient says that it is improving.  No chest pain.  No shortness of breath at rest.  Some nausea.  No vomiting.  Appetite is decreased.  No urinary complaints.  No diarrhea.  He has mild numbness and tingling in the extremities.  No worsening of neuropathy.    In the clinic when nurses were transferring him, patient almost passed out.     Exam:  He is alert and oriented x 3.  He looked very weak.  Vitals: Reviewed.  Rest of the system is not examined.    Labs: CBC and CMP reviewed.     Assessment:  1.  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  Disease is responding.  2.  Acute kidney injury from dehydration.  3.  Chronic kidney disease.  4.  Normocytic anemia from chemoradiation, renal disease and anemia of chronic disease.  5.  Thrombocytopenia from chemotherapy.  6.  Dehydration secondary to decreased oral intake from radiation esophagitis.  7.  Radiation esophagitis.  8.  Sensorineural hearing loss.  9.  Grade 1 peripheral neuropathy.  10.  Generalized weakness from chemoradiation, anemia, dehydration and other medical problems.  11.  Radiation dermatitis involving left neck.     Plan:  -Will admit him to hospital for IV hydration and blood transfusion.    Discussion:  1.  Patient is very weak.  He almost passed out in the clinic.  His weakness is secondary to dehydration, anemia, chemotherapy, radiation and other medical problems.    Labs were reviewed with him.  Explained to him there are multiple abnormalities.  He is anemic with hemoglobin of 7.4.  His platelet has decreased to 26.  His creatinine has increased to 1.95.  He has acute kidney injury.    Explained to the patient that he needs IV hydration.  He also  needs PRBC transfusion.  Explained to him that we will admit him to hospital for hydration and blood transfusion.  Also patient is very weak.  He is at high risk of falling at home.  Patient agreeable for hospitalization.    While awaiting hospital placement, we will start him on IV fluid normal saline.    2.  Patient has high-grade neuroendocrine carcinoma.  He has completed chemotherapy and radiation.  He has responded to it.  Will plan on getting PET scan in 2 to 3 months time.  We will schedule him after his discharge from hospital.    3.  Daughter had few questions which were all answered.  I will see him after discharge from hospital.  Case discussed with Dr. Grady Javed.     Total visit time of 40 minutes.  Time spent in today's visit, review of chart/investigations today and documentation today.

## 2024-10-30 NOTE — TELEPHONE ENCOUNTER
Florencia Butler MD  You; Brigitte Powell RN3 minutes ago (3:55 PM)       Noted.    Florencia Butler MD     No further action needed.  Lindsay Stanton RN on 10/30/2024 at 3:59 PM

## 2024-10-30 NOTE — PROGRESS NOTES
Orientation: A&Ox4  Aggression Stop Light: green  Activity: 1A GB cane  Diet/BS Checks: regular, achs BG checks  Tele:  N/A  IV Access/Drains: right port infusing blood, after blood NaCl at 100 mL/hr  Pain Management: denies pain  Abnormal VS/Results:   Bowel/Bladder: VSS on RA. K+ 2.7, replacement in progress. Mag 1.6. Plt 26, Hgb 7.4  Skin/Wounds: radiation scabs to left neck  Consults: hem/onc, SW  D/C Disposition: pending  Other Info: 1 unit of blood transfusing per orders

## 2024-10-30 NOTE — NURSING NOTE
Writer placed a direct admission order for station 88 for recent falls, weakness, and low hemoglobin.     Patient was admitted to station 88 to rm# [id number].    Brigitte Powell RN

## 2024-10-30 NOTE — TELEPHONE ENCOUNTER
DATE/TIME OF CALL RECEIVED FROM LAB:  10/30/24 at 2:06 PM     LAB TEST & LAB VALUE: platelet 26          Previous Labs from 10/17/2024: platelet 105    PROVIDER NOTIFIED?: Yes    PROVIDER NAME: Dr Butler     TIME LAB VALUE REPORTED TO PROVIDER:   2:08 PM     MECHANISM OF PROVIDER NOTIFICATION: Page and high priority in basket message. Pt has appt with DR Butler this afternoon.     PROVIDER RESPONSE: awaiting reply    Lindsay Stanton RN on 10/30/2024 at 2:08 PM

## 2024-10-30 NOTE — PROGRESS NOTES
Nursing Note:  Pascual NASRIN Perea presents today for port labs.    Patient seen by provider today: Yes: Luke   present during visit today: Not Applicable.    Note: Pt feeling weak, dizzy, short of breath. Daughter reports he had a fall yesterday. Notified MA working with Dr. Butler, left port accessed in case further interventions are indicated today. Daughter aware patient needs to have port de-accessed prior to leaving clinic today.    Intravenous Access:  Labs drawn without difficulty.  Implanted Port.    Discharge Plan:   Patient was sent to [facility] for provider appointment.    Oksana Crawley RN

## 2024-10-30 NOTE — PROGRESS NOTES
Transfer Type: [facility]  Transfer Triage Note    Date of call: 10/30/24  Time of call: 2:51 PM    Current Patient Location:  [facility] [place]   Current Level of Care: Med Surg    Diagnosis: Anemia, acute kidney injury  Reason for requested transfer: Patient has established care here   Isolation Needs: None    Care everywhere has been updated and reviewed: Yes  Necessary images have been sent through PACS: Yes    If patient is transferring for specialty care or specific procedure, the specialist required has participated in the transfer call and agreed with need for transfer and anticipated timeline: Discussed with Dr. Butler, oncology    Transfer accepted: Yes  Stability of Patient: Patient is vitally stable, with no critical labs, and will likely remain stable throughout the transfer process  [facility]  Level of Care Needed: Med Surg  Telemetry Needed:  None  Expected Time of Arrival for Transfer: 0-8 hours  Arrival Location:  [facility]     Recommendations for Management and Stabilization: Not needed    Additional Comments:   Per Dr. 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 He is receiving IVF in clinic. Plan to admit to medicine for IVF, blood products, and electrolyte management.     Selene Mares MD

## 2024-10-30 NOTE — PROGRESS NOTES
Infusion Nursing Note:  Pascual Perea presents today for IV fluids-add on.    Patient seen by provider today: Yes: Dr. Butler.   present during visit today: Not Applicable.    Note: Patient brought over directly from clinic visit, added on for IV fluids while awaiting direct admit to the hospital. Clinic staff to arrange direct admit with hospital staff. All labs including hgb 7.4, platelets 26,000, potassium 3.2, creatinine 1.95 and glucose 466 reviewed with Dr. Butler. Orders as follows per Dr. Butler:    Give 1L NS IV  All abnormal labs to be addressed when patient is admitted, no further orders for infusion today      Intravenous Access:  Implanted Port in place on arrival, excellent blood return noted.    Treatment Conditions:  Lab Results   Component Value Date    HGB 7.4 (L) 10/30/2024    WBC 4.5 10/30/2024    ANEU 2.6 04/13/2020    ANEUTAUTO 3.8 10/30/2024    PLT 26 (LL) 10/30/2024        Lab Results   Component Value Date     (L) 10/30/2024    POTASSIUM 3.2 (L) 10/30/2024    MAG 2.0 06/29/2023    CR 1.95 (H) 10/30/2024    STARR 9.8 10/30/2024    BILITOTAL 1.0 10/30/2024    ALBUMIN 3.8 10/30/2024    ALT 19 10/30/2024    AST 16 10/30/2024     Results reviewed-see notes above.      Post Infusion Assessment:  Patient tolerated infusion without incident.  Blood return noted pre and post infusion.  Site patent and intact, free from redness, edema or discomfort.  No evidence of extravasations.   Port access left in place for hospital admission.      Discharge Plan:   Per [facility] clinic RN, direct admit to room 809, report has been called.  Patient discharged in stable condition accompanied by: daughter.  Departure Mode: Wheelchair.      Candi Woodruff, RN

## 2024-10-30 NOTE — H&P
[facility]  History and Physical   Hospitalist  Grady Javed MD       Pascual Perea MRN# [id number]   YOB: 1940 Age: 84 year old      Date of Admission:  10/30/2024         Assessment and Plan:   Pascual Perea is a 84 year old male with PMH significant for high grade neuroendocrine carcinoma, 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, diabetes, dyslipidemia, atrial fibrillation (on Eliquis), BRANDON, CKD stage III who was sent from oncology clinic for generalized weakness and dehydration with by bicytopenia and acute renal failure, admitted inpatient 10/30/24.    He has been following with Dr. Butler from oncology for his carcinoma and completed radiation therapy on 10/21 and is s/p chemotherapy on 10/18/24. He presented to clinic with generalized weakness, had a fall night prior to admission due to weakness with no head injury or loss of consciousness. He has had some dysphagia and although reports dysphagia getting better he has had poor PO intake. In the clinic he was noted to be dehydrated with severe anemia, thrombocytopenia and acute renal failure.    Generalized weakness  dehydration with acute renal failure on CKD stage III  Hypokalemia  high anion gap  -will admit as inpatient  -routine labs with potassium 3.2, creatinine 1.95 (baseline around 1.3 to 1.4), anion gap 19  -will start NS at 100 ML per hour  -hold off on PTA torsemide  -supplement potassium per protocol  -monitor BMP  -will get PT evaluation; fall precautions    Addendum:  -lactate elevated at 2.5; no clinical concern for infection; hemodynamically stable  -will monitor clinically    High-grade neuroendocrine carcinoma, 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 Butler; diagnosed 06/2024; completed radiation to left parotid between 9/9/24 and 10/21/24  - s/p chemotherapy with  carboplatin and etoposide four cycles between 8/14/24 and 10/18/24  -CBC noted WBC 4.5, hemoglobin 7.4, platelet 26  -discussed with Dr. Butler, will transfuse AK BC for Hb <8  -oncology suggested to hold off on platelet transfusion until platelet count <20  -will monitor CBC    Diabetes mellitus with hyperglycemia  -blood glucose markedly elevated in 400s  -PTA on Lantus 30 units BID and NovoLog 10-20 units TID with meals  -will resume PTA Lantus 30 units BID  -will have NovoLog one unit per 10 g carb TID with meals  -will have high resistance sliding scale insulin    Atrial fibrillation  - since no suggestion of active bleed even though with severe anemia probably would be okay kendall continue with PTA Eliquis but platelet counts too low as well  -will hold off on PTA Eliquis for now and will defer to hematology for resumption pending repeat counts  - rate controlled and not on any rate controlling medications PTA    Clinically Significant Risk Factors Present on Admission        # Hypokalemia: Lowest K = 3.2 mmol/L in last 2 days, will replace as needed  # Hyponatremia: Lowest Na = 134 mmol/L in last 2 days, will monitor as appropriate  # Hypochloremia: Lowest Cl = 93 mmol/L in last 2 days, will monitor as appropriate     # Anion Gap Metabolic Acidosis: Highest Anion Gap = 19 mmol/L in last 2 days, will monitor and treat as appropriate   # Drug Induced Coagulation Defect: home medication list includes an anticoagulant medication    # Thrombocytopenia: Lowest platelets = 26 in last 2 days, will monitor for bleeding  # Acute Kidney Injury, unspecified: based on a >150% or 0.3 mg/dL increase in last creatinine compared to past 90 day average, will monitor renal function    # Hypertension: Noted on problem list        # Anemia: based on hgb <11        # DMII: A1C = N/A within past 6 months                   DVT prophylaxis: on Eliquis  Code status: full code but does not want to be prolonged on life support  indefinitely    Disposition:   Medically Ready for Discharge: Anticipated in 2-4 Days       Care plan discussed with Dr. Butler, patient and nursing           Primary Care Physician:   Andrew Elizalde [phone]         Chief Complaint:     Generalized weakness, anemia, thrombocytopenia    History is obtained from the patient         History of Present Illness:     Pascual Perea is a 84 year old male with PMH significant for high grade neuroendocrine carcinoma, 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, diabetes, dyslipidemia, atrial fibrillation (on Eliquis), BRANDON, CKD stage III who was sent from oncology clinic for generalized weakness and dehydration with by bicytopenia and acute renal failure, admitted inpatient 10/30/24.    He has been following with Dr. Butler from oncology for his carcinoma and completed radiation therapy on 10/21 and is s/p chemotherapy on 10/18/24. He presented to clinic with generalized weakness, had a fall night prior to admission due to weakness with no head injury or loss of consciousness. He has had some dysphagia and although reports dysphagia getting better he has had poor PO intake. In the clinic he was noted to be dehydrated with severe anemia, thrombocytopenia and acute renal failure.    -routine labs with potassium 3.2, creatinine 1.95, anion gap 19, hemoglobin 7.4, platelet 26    The patient denies any fever, chills, rigors, chest pain or shortness of breath.  Denies pain abdomen.  No bowel or bladder disturbances.           Past Medical History:     high grade neuroendocrine carcinoma, 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  Diabetes  Dyslipidemia  atrial fibrillation (on Eliquis)  BRANDON  CKD stage III           Past Surgical History:     Past Surgical History:   Procedure Laterality Date    ANESTHESIA CARDIOVERSION N/A 02/13/2019    Procedure: ANESTHESIA CARDIOVERSION;  Surgeon: GENERIC ANESTHESIA PROVIDER;   Location:  OR    ANESTHESIA CARDIOVERSION N/A 05/22/2019    Procedure: ANESTHESIA, FOR CARDIOVERSION;  Surgeon: GENERIC ANESTHESIA PROVIDER;  Location:  OR    CARDIAC SURGERY      bypass in 1997    CARDIOVERSION  04/24/2018    COLONOSCOPY      CORONARY ANGIOGRAPHY ADULT ORDER      4/2/2012    CORONARY ARTERY BYPASS  1997    [facility]    ENDOSCOPIC ULTRASOUND UPPER GASTROINTESTINAL TRACT (GI) N/A 03/14/2019    Procedure: ENDOSCOPIC ULTRASOUND OF UPPER GASTROINTESTINAL TRACT;  Surgeon: Ju Torres MD;  Location:  OR    ESOPHAGOSCOPY, GASTROSCOPY, DUODENOSCOPY (EGD), COMBINED N/A 4/15/2022    Procedure: ESOPHAGOGASTRODUODENOSCOPY (EGD);  Surgeon: Erik Baca DO;  Location:  GI    EXCISE MASS HEAD Left 08/18/2016    Procedure: EXCISE MASS HEAD;  Surgeon: Ivan Hernandez MD;  Location: [facility]    HEART CATH, ANGIOPLASTY  04/2012    IR CHEST PORT PLACEMENT > 5 YRS OF AGE  8/9/2024    LAPAROSCOPIC CHOLECYSTECTOMY N/A 03/17/2019    Procedure: LAPAROSCOPIC CHOLECYSTECTOMY;  Surgeon: Janel Paz MD;  Location:  OR    PHACOEMULSIFICATION CLEAR CORNEA WITH STANDARD INTRAOCULAR LENS IMPLANT Left 06/08/2015    Procedure: PHACOEMULSIFICATION CLEAR CORNEA WITH STANDARD INTRAOCULAR LENS IMPLANT;  Surgeon: Nixon Farnsworth MD;  Location:  EC    PHACOEMULSIFICATION CLEAR CORNEA WITH STANDARD INTRAOCULAR LENS IMPLANT Right 06/22/2015    Procedure: PHACOEMULSIFICATION CLEAR CORNEA WITH STANDARD INTRAOCULAR LENS IMPLANT;  Surgeon: Nixon Farnsworth MD;  Location:  EC    SEPTOPLASTY, TURBINOPLASTY, COMBINED Bilateral 08/18/2016    Procedure: COMBINED SEPTOPLASTY, TURBINOPLASTY;  Surgeon: Ivan Hernandez MD;  Location:  SD    ZZC NONSPECIFIC PROCEDURE  1997    CABG ([facility])    ZZC NONSPECIFIC PROCEDURE  08/2001    stress echo              Home Medications:     Prior to Admission Medications   Prescriptions Last Dose Informant Patient Reported? Taking?    Continuous Blood Gluc  (FREESTYLE DELFINA 2 READER) CHRYSTAL   No No   Si Device continuous   Continuous Blood Gluc Sensor (FREESTYLE DELFINA 2 SENSOR) [facility]   No No   Sig: APPLY 1 SENSOR AND CHANGE EVERY 14 DAYS AS DIRECTED   Insulin Aspart FlexPen 100 UNIT/ML SOPN 10/30/2024 Morning  No Yes   Sig: Inject 6-10 Units Subcutaneous 3 times daily (with meals) Plus priming   Patient taking differently: Inject subcutaneously 3 times daily (with meals). Plus priming   apixaban ANTICOAGULANT (ELIQUIS ANTICOAGULANT) 5 MG tablet 10/30/2024 Morning  No Yes   Sig: Take 1 tablet (5 mg) by mouth 2 times daily.   atorvastatin (LIPITOR) 40 MG tablet 10/30/2024 Morning  No Yes   Sig: TAKE 1 TABLET (40 MG) BY MOUTH DAILY   azelastine 137 MCG/SPRAY SOLN Past Week  No Yes   Sig: Spray 2 sprays into both nostrils 2 times daily.   Patient taking differently: Spray 2 sprays into both nostrils 2 times daily as needed (runny nose).   blood glucose (ACCU-CHEK NORMAN PLUS) test strip   No No   Sig: USE TO TEST BLOOD SUGAR 3 TIMES A DAY OR AS DIRECTED   blood glucose calibration (ACCU-CHEK NORMAN) solution  Self No No   Sig: USE AS DIRECTED   fenofibrate micronized (LOFIBRA) 67 MG capsule 10/30/2024 Morning  No Yes   Sig: TAKE 1 CAPSULE BY MOUTH EVERY MORNING BEFORE BREAKFAST   glucose 4 g CHEW  Self Yes No   Sig: Take 1 tablet by mouth every hour as needed for low blood sugar    insulin glargine (LANTUS SOLOSTAR) 100 UNIT/ML pen 10/30/2024 Morning  No Yes   Sig: INJECT 31 UNITS UNDER THE SKIN EVERY 12 HOURS   Patient taking differently: Inject 30 Units subcutaneously 2 times daily.   insulin pen needle (GLOBAL EASE INJECT PEN NEEDLES) 31G X 5 MM miscellaneous   No No   Sig: USE 1 PENTIP AS DIRECTED 4 TIMES A DAY   isosorbide mononitrate (IMDUR) 30 MG 24 hr tablet 10/30/2024 Morning  No Yes   Sig: Take 1 tablet (30 mg) by mouth daily   levothyroxine (SYNTHROID/LEVOTHROID) 125 MCG tablet 10/30/2024 Morning  No Yes   Sig: Take 1 tablet (125  mcg) by mouth daily   nitroGLYcerin (NITROSTAT) 0.4 MG sublingual tablet   No No   Sig: Place 1 tablet (0.4 mg) under the tongue every 5 minutes as needed for chest pain   torsemide (DEMADEX) 10 MG tablet Not Taking  No No   Sig: Take 2 tablets (20 mg) by mouth daily   Patient not taking: Reported on 10/30/2024   torsemide (DEMADEX) 20 MG tablet 10/30/2024 Morning  Yes Yes   Sig: Take 20 mg by mouth daily.      Facility-Administered Medications: None            Allergies:     Allergies   Allergen Reactions    Omeprazole      diarrhea    Pantoprazole      diarrhea            Social History:   Pascual Perea  reports that he quit smoking about 60 years ago. His smoking use included cigarettes, cigars, and pipe. He started smoking about 66 years ago. He has a 6.5 pack-year smoking history. He has never used smokeless tobacco. He reports that he does not currently use alcohol. He reports that he does not use drugs.              Family History:   Pascual Perea family history includes Cardiovascular in his brother; Diabetes in his mother, sister, and sister; Genitourinary Problems in his father; Heart Disease in his brother and mother; Hypertension in his father; Myocardial Infarction in his mother.    Family history was reviewed by myself and not pertinent to current presentation.           Review of Systems:   A10 point Review of Systems was done and were negative other than noted in the HPI.             Physical Exam:   Blood pressure 113/57, pulse 99, temperature 98.3  F (36.8  C), temperature source Oral, SpO2 98%.  0 lbs 0 oz        Constitutional: Alert, awake and oriented X 3; lying comfortably in bed in no apparent distress   HEENT: noted radiation skin burns on left neck   Oral cavity: Moist mucosa   Cardiovascular: Normal s1 s2, regular rate and rhythm, no murmur   Lungs: B/l clear to auscultation, no wheezes or crepitations   Abdomen: Soft, nt, nd, no guarding, rigidity or rebound; BS +   LE : No edema    Musculoskeletal: Power 5/5 in all extremities   Neuro: No focal neurological deficits noted, CN II to XII grossly intact   Psychiatry: normal mood and affect             Data:   All new lab and imaging data was reviewed in Epic.   Significant labs and imagings include:    CMP notable for sodium 134 (corrected to 138 for blood glucose 466), potassium 3.2, creatinine 1.95  CBC with WBC 4.5, hemoglobin 7.4, platelet 26             Grady Javed MD  Hospitalist

## 2024-10-31 LAB
ANION GAP SERPL CALCULATED.3IONS-SCNC: 6 MMOL/L (ref 7–15)
BLD PROD TYP BPU: NORMAL
BLD PROD TYP BPU: NORMAL
BLOOD COMPONENT TYPE: NORMAL
BLOOD COMPONENT TYPE: NORMAL
BUN SERPL-MCNC: 35.4 MG/DL (ref 8–23)
CALCIUM SERPL-MCNC: 9 MG/DL (ref 8.8–10.4)
CHLORIDE SERPL-SCNC: 105 MMOL/L (ref 98–107)
CODING SYSTEM: NORMAL
CODING SYSTEM: NORMAL
CREAT SERPL-MCNC: 1.46 MG/DL (ref 0.67–1.17)
CROSSMATCH: NORMAL
CROSSMATCH: NORMAL
EGFRCR SERPLBLD CKD-EPI 2021: 47 ML/MIN/1.73M2
ERYTHROCYTE [DISTWIDTH] IN BLOOD BY AUTOMATED COUNT: 17.2 % (ref 10–15)
GLUCOSE BLDC GLUCOMTR-MCNC: 102 MG/DL (ref 70–99)
GLUCOSE BLDC GLUCOMTR-MCNC: 144 MG/DL (ref 70–99)
GLUCOSE BLDC GLUCOMTR-MCNC: 153 MG/DL (ref 70–99)
GLUCOSE BLDC GLUCOMTR-MCNC: 225 MG/DL (ref 70–99)
GLUCOSE BLDC GLUCOMTR-MCNC: 73 MG/DL (ref 70–99)
GLUCOSE BLDC GLUCOMTR-MCNC: 79 MG/DL (ref 70–99)
GLUCOSE SERPL-MCNC: 79 MG/DL (ref 70–99)
HCO3 SERPL-SCNC: 30 MMOL/L (ref 22–29)
HCT VFR BLD AUTO: 20.9 % (ref 40–53)
HGB BLD-MCNC: 6.8 G/DL (ref 13.3–17.7)
HGB BLD-MCNC: 8 G/DL (ref 13.3–17.7)
ISSUE DATE AND TIME: NORMAL
ISSUE DATE AND TIME: NORMAL
MCH RBC QN AUTO: 29.6 PG (ref 26.5–33)
MCHC RBC AUTO-ENTMCNC: 32.5 G/DL (ref 31.5–36.5)
MCV RBC AUTO: 91 FL (ref 78–100)
PLATELET # BLD AUTO: 21 10E3/UL (ref 150–450)
POTASSIUM SERPL-SCNC: 3.1 MMOL/L (ref 3.4–5.3)
POTASSIUM SERPL-SCNC: 3.6 MMOL/L (ref 3.4–5.3)
POTASSIUM SERPL-SCNC: 3.6 MMOL/L (ref 3.4–5.3)
RBC # BLD AUTO: 2.3 10E6/UL (ref 4.4–5.9)
SODIUM SERPL-SCNC: 141 MMOL/L (ref 135–145)
UNIT ABO/RH: NORMAL
UNIT ABO/RH: NORMAL
UNIT NUMBER: NORMAL
UNIT NUMBER: NORMAL
UNIT STATUS: NORMAL
UNIT STATUS: NORMAL
UNIT TYPE ISBT: 600
UNIT TYPE ISBT: 600
WBC # BLD AUTO: 3.8 10E3/UL (ref 4–11)

## 2024-10-31 PROCEDURE — 120N000001 HC R&B MED SURG/OB

## 2024-10-31 PROCEDURE — 250N000013 HC RX MED GY IP 250 OP 250 PS 637: Performed by: HOSPITALIST

## 2024-10-31 PROCEDURE — 80048 BASIC METABOLIC PNL TOTAL CA: CPT | Performed by: HOSPITALIST

## 2024-10-31 PROCEDURE — 250N000011 HC RX IP 250 OP 636: Performed by: INTERNAL MEDICINE

## 2024-10-31 PROCEDURE — P9016 RBC LEUKOCYTES REDUCED: HCPCS | Performed by: HOSPITALIST

## 2024-10-31 PROCEDURE — 82310 ASSAY OF CALCIUM: CPT | Performed by: HOSPITALIST

## 2024-10-31 PROCEDURE — 258N000003 HC RX IP 258 OP 636: Performed by: HOSPITALIST

## 2024-10-31 PROCEDURE — 84132 ASSAY OF SERUM POTASSIUM: CPT | Performed by: HOSPITALIST

## 2024-10-31 PROCEDURE — 99233 SBSQ HOSP IP/OBS HIGH 50: CPT | Performed by: STUDENT IN AN ORGANIZED HEALTH CARE EDUCATION/TRAINING PROGRAM

## 2024-10-31 PROCEDURE — 85018 HEMOGLOBIN: CPT | Performed by: STUDENT IN AN ORGANIZED HEALTH CARE EDUCATION/TRAINING PROGRAM

## 2024-10-31 PROCEDURE — 99223 1ST HOSP IP/OBS HIGH 75: CPT | Performed by: INTERNAL MEDICINE

## 2024-10-31 RX ORDER — POTASSIUM CHLORIDE 1500 MG/1
40 TABLET, EXTENDED RELEASE ORAL ONCE
Status: COMPLETED | OUTPATIENT
Start: 2024-10-31 | End: 2024-10-31

## 2024-10-31 RX ADMIN — ATORVASTATIN CALCIUM 40 MG: 40 TABLET, FILM COATED ORAL at 11:30

## 2024-10-31 RX ADMIN — LEVOTHYROXINE SODIUM 125 MCG: 100 TABLET ORAL at 06:54

## 2024-10-31 RX ADMIN — Medication 5 ML: at 22:12

## 2024-10-31 RX ADMIN — Medication 5 ML: at 15:42

## 2024-10-31 RX ADMIN — INSULIN ASPART 4 UNITS: 100 INJECTION, SOLUTION INTRAVENOUS; SUBCUTANEOUS at 12:53

## 2024-10-31 RX ADMIN — SODIUM CHLORIDE: 9 INJECTION, SOLUTION INTRAVENOUS at 06:14

## 2024-10-31 RX ADMIN — FENOFIBRATE 54 MG: 54 TABLET ORAL at 11:30

## 2024-10-31 RX ADMIN — INSULIN ASPART 5 UNITS: 100 INJECTION, SOLUTION INTRAVENOUS; SUBCUTANEOUS at 18:00

## 2024-10-31 RX ADMIN — POTASSIUM CHLORIDE 40 MEQ: 1500 TABLET, EXTENDED RELEASE ORAL at 01:32

## 2024-10-31 RX ADMIN — Medication 5 ML: at 17:55

## 2024-10-31 RX ADMIN — ISOSORBIDE MONONITRATE 30 MG: 30 TABLET, EXTENDED RELEASE ORAL at 11:30

## 2024-10-31 RX ADMIN — Medication 5 ML: at 11:29

## 2024-10-31 ASSESSMENT — ACTIVITIES OF DAILY LIVING (ADL)
ADLS_ACUITY_SCORE: 0
WALKING_OR_CLIMBING_STAIRS_DIFFICULTY: YES
HEARING_DIFFICULTY_OR_DEAF: YES
CONCENTRATING,_REMEMBERING_OR_MAKING_DECISIONS_DIFFICULTY: NO
DESCRIBE_HEARING_LOSS: BILATERAL HEARING LOSS
ADLS_ACUITY_SCORE: 0
EQUIPMENT_CURRENTLY_USED_AT_HOME: CANE, STRAIGHT;GLUCOMETER
ADLS_ACUITY_SCORE: 0
USE_OF_HEARING_ASSISTIVE_DEVICES: BILATERAL HEARING AIDS
ADLS_ACUITY_SCORE: 0
ADLS_ACUITY_SCORE: 0
TOILETING_ISSUES: NO
ADLS_ACUITY_SCORE: 0
THE_FOLLOWING_AIDS_WERE_PROVIDED;: PATIENT DECLINED OFFER OF AUXILIARY AIDS
FALL_HISTORY_WITHIN_LAST_SIX_MONTHS: YES
ADLS_ACUITY_SCORE: 0
ADLS_ACUITY_SCORE: 0
EATING/SWALLOWING: EATING;SWALLOWING LIQUIDS;SWALLOWING SOLID FOOD
CHANGE_IN_FUNCTIONAL_STATUS_SINCE_ONSET_OF_CURRENT_ILLNESS/INJURY: YES
WERE_AUXILIARY_AIDS_OFFERED?: YES
PATIENT'S_PREFERRED_MEANS_OF_COMMUNICATION: ENGLISH SPEAKER WITH HEARING LOSS, NO SPEECH PROBLEMS.
ADLS_ACUITY_SCORE: 0
DIFFICULTY_EATING/SWALLOWING: YES
ADLS_ACUITY_SCORE: 0
DOING_ERRANDS_INDEPENDENTLY_DIFFICULTY: YES
ADLS_ACUITY_SCORE: 0
DRESSING/BATHING_DIFFICULTY: NO
WEAR_GLASSES_OR_BLIND: NO
ADLS_ACUITY_SCORE: 0
WALKING_OR_CLIMBING_STAIRS: AMBULATION DIFFICULTY, REQUIRES EQUIPMENT
DIFFICULTY_COMMUNICATING: NO

## 2024-10-31 NOTE — PROVIDER NOTIFICATION
MD Notification    Notified Person: MD    Notified Person Name: albert    Notification Date/Time: 10/31 0832    Notification Interaction: vocera     Purpose of Notification: BG 79 this AM. Okay to hold the 30 units of lantus? It's BID    Orders Received: hold the lantus    Comments:      MD Notification    Notified Person: MD    Notified Person Name: albert    Notification Date/Time: 10/31 1118    Notification Interaction: vocera    Purpose of Notification: Did you want to order a CBC? Plts yesterday were 26 and hgb was 7.4, Gave 1 unit of blood last evening    Orders Received: order for CBC    Comments:

## 2024-10-31 NOTE — PLAN OF CARE
[id number] - [id number]    Orientation: A&Ox4, [place]    Aggression Stop Light: Green    Activity: A1 w/GB+cane    Diet/BS Checks: Regular, BG checks    Tele: N/A    IV Access/Drains: R port infusing NS @ 100 mL/hr    Pain Management: Denies pain    Abnormal VS/Results: VSS on RA    Bowel/Bladder: Continent    Skin/Wounds: Radiation scab to L neck, scattered bruising    Consults: Hem/Onc, PT, SW    D/C Disposition: Pending improvement    Other Info:   - K+ recheck 3.1, replaced - recheck  - Lactic 2.2

## 2024-10-31 NOTE — CONSULTS
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     Plan  Anemia and thrombocytopenia are expected with this chemotherapy regimen especially at the 4th cycle in an 84 year old with CKD.  PRBC support with Goal Hgb above 7-8 g/dl. S/p PRBC on 10/31/2024  WBC improved. ANC pending . 3.8 on 10/30/24   PLT- currently at 21K 10/31/24. Plan for PLT if counts less than 20K. Typically PLC is expected to recover within 10 days (10/28/2024) of chemotherapy- however a delayed recovery may be seen with concurrent RT exposure and with CKD.   Patient to follow up with Dr. Butler in 4+ weeks. PET scan to be scheduled as outpatient 3 months post chemo-RT.     2. Radiation toxicity  A. Dermatitis: No active sign of infection. Continue with dressing changes as needed.   B. Dehydration secondary to decreased oral intake from radiation esophagitis. Elevated Cr and low K. Improved S/p IV fluids. Advance diet as tolerated.     3. Anticoagulation with Eliquis. Indicated for primary prevention of CVA in a a patient with Afib- continue to hold Eliquis and may resume once PLT above 50K.       ONCOLOGY HISTORY: Mr. 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     1.  CT neck on 06/20/2024 revealed 2.8 cm mass in left parotid gland.  Also revealed multiple enlarged left-sided cervical lymph node.  2.  Ultrasound-guided biopsy of left cervical mass on 06/28/2024 revealed METASTATIC HIGH-GRADE NEUROENDOCRINE  CARCINOMA, LARGE CELL TYPE, at least 1.2 cm in linear extent, extending to biopsy margins.  3.  PET scan on 07/19/2024 revealed hypermetabolic left parotid mass and multiple hypermetabolic left cervical lymphadenopathy.  Severely stenotic left internal jugular vein secondary to mass effect.  4. He was seen by ENT at [facility].  Flexible fiberoptic laryngoscopy on 07/15/2024 was normal.  5. Case discussed at tumor board at [facility].  Tumor board recommendation was to treat it as small cell lung cancer.  Recommendation was to give him 4 cycle of platinum and etoposide and add radiation with cycle 2 or 3.  6. Carboplatin and etoposide x 4 cycles between 08/14/2024 and 10/18/2024 (carboplatin chosen because of CKD, hearing loss and his age).  -Radiation to left parotid between 09/09/2024 and 10/21/2024.  6000 cGy in 30 fractions.      History of present illness: Pascual Perea is a 84 year old patient   Mr. 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  He completed 4 cycle of carboplatin and etoposide on 10/18/2024.  He completed radiation on 10/21/2024.  Mass in the left neck has decreased in size.      Patient has not been feeling well for the last few days.  He has been getting weaker.  He has to rest after walking 10 steps.  He has been having lightheadedness/dizziness.  He gets short of breath on minimal exertion.  Patient has some pain during swallowing from radiation esophagitis.  His oral intake has been decreased.  He has skin breakdown in left side of the neck from radiation.  Patient says that it is improving.  No chest pain.  No shortness of breath at rest.  Some nausea.  No vomiting.  Appetite is decreased.  No urinary complaints.  No diarrhea.  He has mild numbness and tingling in the extremities.  No  worsening of neuropathy.    In the clinic when nurses were transferring him, patient almost passed out. He was sent to the ED and admitted. He is currently receiving PRBC. He was on IV fluids. He reports significant skin toxicity from the radiation on the left neck/   His ANC was 3/8 10/30/24. His creatinine was 1/9 but now at 1.4 closer to his baseline.         Recent Labs   Lab Test 10/31/24  1124   WBC 3.8*   RBC 2.30*   HGB 6.8*   HCT 20.9*   MCV 91   MCH 29.6   MCHC 32.5   RDW 17.2*   PLT 21*              Exam:  He is alert and oriented x 3.  Pale- but conversant and feels well today.  Neck- with skin erosions around the left cervical area without any abscesses, masses or drainage.   LE- no edema.   Vitals: Reviewed.      70 minutes spent on the date of the encounter doing chart review, review of test results, interpretation of tests, patient visit, documentation, and discussion with other provider(s)      The longitudinal plan of care for the diagnosis(es)/condition(s) as documented were addressed during this visit. Due to the added complexity in care, I will continue to support [facility] in the subsequent management and with ongoing continuity of care.    Chong Espino  Hematology/Oncology  [facility]

## 2024-10-31 NOTE — PROVIDER NOTIFICATION
MD Notification    Notified Person: MD    Notified Person Name: Luna    Notification Date/Time: 10/31/24 12:47 AM    Notification Interaction: [employer] Messaging    Purpose of Notification: Pt lactic 2.2. Please advise. Thanks.    Orders Received: MD aware, CTM    Comments:

## 2024-10-31 NOTE — PROGRESS NOTES
[facility]    Medicine Progress Note - Hospitalist Service    Date of Admission:  10/30/2024    Assessment & Plan     Pascual Perea is a 84 year old male with PMH significant for high grade neuroendocrine carcinoma, 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, diabetes, dyslipidemia, atrial fibrillation (on Eliquis), BRANDON, CKD stage III who was sent from oncology clinic for generalized weakness and dehydration with by bicytopenia and acute renal failure, admitted inpatient 10/30/24.     He has been following with Dr. Butler from oncology for his carcinoma and completed radiation therapy on 10/21 and is s/p chemotherapy on 10/18/24. He presented to clinic with generalized weakness, had a fall night prior to admission due to weakness with no head injury or loss of consciousness. He has had some dysphagia and although reports dysphagia getting better he has had poor PO intake. In the clinic he was noted to be dehydrated with severe anemia, thrombocytopenia and acute renal failure.     Generalized weakness  dehydration with acute renal failure on CKD stage III  Hypokalemia  high anion gap  Plan:  -routine labs with potassium 3.2, creatinine 1.95 (baseline around 1.3 to 1.4), anion gap 19  Plan:  -Hold further NS at 100 ML per hour  -hold off on PTA torsemide  -supplement potassium per protocol  -monitor BMP  -will get PT evaluation; fall precautions  -lactate elevated at 2.5; no clinical concern for infection; hemodynamically stable  -will monitor clinically     High-grade neuroendocrine carcinoma, 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 Butler; diagnosed 06/2024; completed radiation to left parotid between 9/9/24 and 10/21/24  - s/p chemotherapy with carboplatin and etoposide four cycles between 8/14/24 and 10/18/24  -CBC noted WBC 4.5, hemoglobin 7.4, platelet  "26  -discussed with Dr. Butler, will transfuse FL BC for Hb <8  -oncology suggested to hold off on platelet transfusion until platelet count <20  -will monitor CBC, transfuse 1U pRBC today for Hgb 6.8  -Transfer for Hgb <7.0, follow Hgb every 12 hours     Diabetes mellitus with hyperglycemia  -blood glucose markedly elevated in 400s  -PTA on Lantus 30 units BID and NovoLog 10-20 units TID with meals  Plan\"  -will hold PTA Lantus 30 units BID -> soft BGs this AM and minimal PO intake  -will have NovoLog one unit per 10 g carb TID with meals  -will have high resistance sliding scale insulin  -Hypoglycemia protocol     Atrial fibrillation  - since no suggestion of active bleed even though with severe anemia probably would be okay kendall continue with PTA Eliquis but platelet counts too low as well  - will hold off on PTA Eliquis for now -> may resume once PLT above 50K.   - rate controlled and not on any rate controlling medications PTA              Diet: Combination Diet Regular Diet Adult  Room Service    DVT Prophylaxis: Pneumatic Compression Devices  Duran Catheter: Not present  Lines: PRESENT      Port a Cath 08/09/24 Single Lumen Right Chest wall-Site Assessment: WDL      Cardiac Monitoring: None  Code Status: Full Code      Clinically Significant Risk Factors Present on Admission        # Hypokalemia: Lowest K = 2.7 mmol/L in last 2 days, will replace as needed  # Hyponatremia: Lowest Na = 134 mmol/L in last 2 days, will monitor as appropriate  # Hypochloremia: Lowest Cl = 93 mmol/L in last 2 days, will monitor as appropriate    # Hypomagnesemia: Lowest Mg = 1.6 mg/dL in last 2 days, will replace as needed  # Anion Gap Metabolic Acidosis: Highest Anion Gap = 19 mmol/L in last 2 days, will monitor and treat as appropriate   # Drug Induced Coagulation Defect: home medication list includes an anticoagulant medication  # Thrombocytopenia: Lowest platelets = 21 in last 2 days, will monitor for bleeding   # Hypertension: " Noted on problem list    # Anemia: based on hgb <11      # DMII: A1C = N/A within past 6 months              Disposition Plan     Medically Ready for Discharge: Anticipated in 2-4 Days             Kolton Christensen MD  Hospitalist Service  [facility]  Securely message with [employer] (more info)  Text page via [employer] Paging/Directory   ______________________________________________________________________    Interval History     Still ongoing weakness  No CP/SOB  No weakness  No nausea / vomiting   Reports he doesn't enjoy drinking water because tastes terrible  No fevers  No bloody stools  No hematuria  No new complaints    Physical Exam   Vital Signs: Temp: 97.4  F (36.3  C) Temp src: Oral BP: 111/51 Pulse: 57   Resp: 16 SpO2: 98 % O2 Device: None (Room air)    Weight: 0 lbs 0 oz    Constitutional: Alert, awake and oriented X 3; lying comfortably in bed in no apparent distress   HEENT: noted radiation skin burns on left neck   Oral cavity: Moist mucosa   Cardiovascular: Normal s1 s2, regular rate and rhythm, no murmur   Lungs: B/l clear to auscultation, no wheezes or crepitations   Abdomen: Soft, nt, nd, no guarding, rigidity or rebound; BS +   LE : No edema   Musculoskeletal: Power 5/5 in all extremities   Neuro: No focal neurological deficits noted, CN II to XII grossly intact   Psychiatry: normal mood and affect     ----------------------------------------------------------------------------------------    Medical Decision Making       50 MINUTES SPENT BY ME on the date of service doing chart review, history, exam, documentation & further activities per the note.      Data   ------------------------- PAST 24 HR DATA REVIEWED -----------------------------------------------    I have personally reviewed the following data over the past 24 hrs:    3.8 (L)  \   6.8 (LL)   / 21 (LL)     141 105 35.4 (H) /  153 (H)   3.6; 3.6 30 (H) 1.46 (H) \     Procal: N/A CRP: N/A Lactic Acid: 2.2 (H)          Imaging results reviewed over the past 24 hrs:   No results found for this or any previous visit (from the past 24 hours).  ------------------------- ENCOUNTER LABS ----------------------------------------------------------------  Recent Labs   Lab 10/31/24  1214 10/31/24  1124 10/31/24  0716 10/31/24  0609 10/31/24  0210 10/31/24  0031 10/30/24  1702 10/30/24  1644 10/30/24  1337   WBC  --  3.8*  --   --   --   --   --   --  4.5   HGB  --  6.8*  --   --   --   --   --   --  7.4*   MCV  --  91  --   --   --   --   --   --  91   PLT  --  21*  --   --   --   --   --   --  26*   NA  --   --   --  141  --   --   --   --  134*   POTASSIUM  --   --   --  3.6  3.6  --  3.1*  --  2.7* 3.2*   CHLORIDE  --   --   --  105  --   --   --   --  93*   CO2  --   --   --  30*  --   --   --   --  22   BUN  --   --   --  35.4*  --   --   --   --  44.9*   CR  --   --   --  1.46*  --   --   --   --  1.95*   ANIONGAP  --   --   --  6*  --   --   --   --  19*   STARR  --   --   --  9.0  --   --   --   --  9.8   *  --  79 79   < >  --    < >  --  466*   ALBUMIN  --   --   --   --   --   --   --   --  3.8   PROTTOTAL  --   --   --   --   --   --   --   --  6.4   BILITOTAL  --   --   --   --   --   --   --   --  1.0   ALKPHOS  --   --   --   --   --   --   --   --  92   ALT  --   --   --   --   --   --   --   --  19   AST  --   --   --   --   --   --   --   --  16    < > = values in this interval not displayed.       Most Recent 3 CBC's:  Recent Labs   Lab Test 10/31/24  1124 10/30/24  1337 10/17/24  1237   WBC 3.8* 4.5 5.0   HGB 6.8* 7.4* 7.0*   MCV 91 91 96   PLT 21* 26* 105*     Most Recent 3 BMP's:  Recent Labs   Lab Test 10/31/24  1214 10/31/24  0716 10/31/24  0609 10/31/24  0210 10/31/24  0031 10/30/24  1702 10/30/24  1644 10/30/24  1337 10/16/24  1329   NA  --   --  141  --   --   --   --  134* 141   POTASSIUM  --   --  3.6  3.6  --  3.1*  --  2.7* 3.2* 4.1   CHLORIDE  --   --  105  --   --   --   --  93* 105   CO2  --   --   30*  --   --   --   --  22 24   BUN  --   --  35.4*  --   --   --   --  44.9* 15.8   CR  --   --  1.46*  --   --   --   --  1.95* 1.31*   ANIONGAP  --   --  6*  --   --   --   --  19* 12   STARR  --   --  9.0  --   --   --   --  9.8 9.1   * 79 79   < >  --    < >  --  466* 210*    < > = values in this interval not displayed.     Most Recent 2 LFT's:  Recent Labs   Lab Test 10/30/24  1337 10/16/24  1329   AST 16 19   ALT 19 14   ALKPHOS 92 75   BILITOTAL 1.0 0.6     Most Recent 3 INR's:  Recent Labs   Lab Test 05/22/19  0948 03/17/19  0541 03/14/19  0847   INR 1.43* 1.09 1.37*     Most Recent 3 Troponin's:  Recent Labs   Lab Test 06/17/21  1258 02/18/20  0926 01/09/20  0627   TROPI <0.015 <0.015 <0.015     Most Recent 3 BNP's:  Recent Labs   Lab Test 10/14/22  2313 02/19/22  1010 12/25/21  1145 01/08/20  1514 04/19/19  0848   NTBNPI 1,781 1,449 7,439*   < >  --    NTBNP  --   --   --   --  985*    < > = values in this interval not displayed.     Most Recent D-dimer:No lab results found.  Most Recent Cholesterol Panel:  Recent Labs   Lab Test 04/17/24  0832   CHOL 144   LDL 43   HDL 36*   TRIG 324*     Most Recent TSH and T4:  Recent Labs   Lab Test 06/05/24  0858 05/15/24  1234 04/17/24  0832   TSH 2.59   < > 4.36*   T4  --   --  1.30    < > = values in this interval not displayed.     Most Recent Urinalysis:  Recent Labs   Lab Test 11/27/23  1401 12/25/21  1333   COLOR Yellow Light Yellow   APPEARANCE Clear Clear   URINEGLC Negative 300*   URINEBILI Negative Negative   URINEKETONE Negative Negative   SG <=1.005 1.023   UBLD Negative Negative   URINEPH 5.0 6.0   PROTEIN Negative Negative   UROBILINOGEN 0.2  --    NITRITE Negative Negative   LEUKEST Negative Negative   RBCU  --  <1   WBCU  --  1

## 2024-11-01 ENCOUNTER — APPOINTMENT (OUTPATIENT)
Dept: PHYSICAL THERAPY | Facility: CLINIC | Age: 84
DRG: 682 | End: 2024-11-01
Attending: HOSPITALIST
Payer: COMMERCIAL

## 2024-11-01 VITALS
OXYGEN SATURATION: 96 % | TEMPERATURE: 98 F | SYSTOLIC BLOOD PRESSURE: 112 MMHG | HEART RATE: 68 BPM | RESPIRATION RATE: 18 BRPM | DIASTOLIC BLOOD PRESSURE: 63 MMHG

## 2024-11-01 LAB
ANION GAP SERPL CALCULATED.3IONS-SCNC: 8 MMOL/L (ref 7–15)
BLD PROD TYP BPU: NORMAL
BLOOD COMPONENT TYPE: NORMAL
BUN SERPL-MCNC: 20.6 MG/DL (ref 8–23)
CALCIUM SERPL-MCNC: 9.1 MG/DL (ref 8.8–10.4)
CHLORIDE SERPL-SCNC: 105 MMOL/L (ref 98–107)
CODING SYSTEM: NORMAL
CREAT SERPL-MCNC: 1.1 MG/DL (ref 0.67–1.17)
EGFRCR SERPLBLD CKD-EPI 2021: 67 ML/MIN/1.73M2
ERYTHROCYTE [DISTWIDTH] IN BLOOD BY AUTOMATED COUNT: 16.4 % (ref 10–15)
GLUCOSE BLDC GLUCOMTR-MCNC: 171 MG/DL (ref 70–99)
GLUCOSE BLDC GLUCOMTR-MCNC: 174 MG/DL (ref 70–99)
GLUCOSE BLDC GLUCOMTR-MCNC: 223 MG/DL (ref 70–99)
GLUCOSE SERPL-MCNC: 184 MG/DL (ref 70–99)
HCO3 SERPL-SCNC: 26 MMOL/L (ref 22–29)
HCT VFR BLD AUTO: 26.7 % (ref 40–53)
HGB BLD-MCNC: 9 G/DL (ref 13.3–17.7)
HGB BLD-MCNC: 9 G/DL (ref 13.3–17.7)
ISSUE DATE AND TIME: NORMAL
MCH RBC QN AUTO: 30.4 PG (ref 26.5–33)
MCHC RBC AUTO-ENTMCNC: 33.6 G/DL (ref 31.5–36.5)
MCV RBC AUTO: 91 FL (ref 78–100)
PLATELET # BLD AUTO: 21 10E3/UL (ref 150–450)
POTASSIUM SERPL-SCNC: 3.9 MMOL/L (ref 3.4–5.3)
RBC # BLD AUTO: 2.93 10E6/UL (ref 4.4–5.9)
SODIUM SERPL-SCNC: 139 MMOL/L (ref 135–145)
UNIT ABO/RH: NORMAL
UNIT NUMBER: NORMAL
UNIT STATUS: NORMAL
UNIT TYPE ISBT: 6200
WBC # BLD AUTO: 4.4 10E3/UL (ref 4–11)

## 2024-11-01 PROCEDURE — 99239 HOSP IP/OBS DSCHRG MGMT >30: CPT | Performed by: STUDENT IN AN ORGANIZED HEALTH CARE EDUCATION/TRAINING PROGRAM

## 2024-11-01 PROCEDURE — 85014 HEMATOCRIT: CPT | Performed by: STUDENT IN AN ORGANIZED HEALTH CARE EDUCATION/TRAINING PROGRAM

## 2024-11-01 PROCEDURE — 97116 GAIT TRAINING THERAPY: CPT | Mod: GP

## 2024-11-01 PROCEDURE — 250N000013 HC RX MED GY IP 250 OP 250 PS 637: Performed by: HOSPITALIST

## 2024-11-01 PROCEDURE — 85018 HEMOGLOBIN: CPT | Performed by: STUDENT IN AN ORGANIZED HEALTH CARE EDUCATION/TRAINING PROGRAM

## 2024-11-01 PROCEDURE — P9035 PLATELET PHERES LEUKOREDUCED: HCPCS

## 2024-11-01 PROCEDURE — 250N000011 HC RX IP 250 OP 636: Performed by: INTERNAL MEDICINE

## 2024-11-01 PROCEDURE — G0463 HOSPITAL OUTPT CLINIC VISIT: HCPCS

## 2024-11-01 PROCEDURE — 97161 PT EVAL LOW COMPLEX 20 MIN: CPT | Mod: GP

## 2024-11-01 PROCEDURE — 97530 THERAPEUTIC ACTIVITIES: CPT | Mod: GP

## 2024-11-01 PROCEDURE — P9037 PLATE PHERES LEUKOREDU IRRAD: HCPCS | Performed by: STUDENT IN AN ORGANIZED HEALTH CARE EDUCATION/TRAINING PROGRAM

## 2024-11-01 PROCEDURE — 80048 BASIC METABOLIC PNL TOTAL CA: CPT | Performed by: STUDENT IN AN ORGANIZED HEALTH CARE EDUCATION/TRAINING PROGRAM

## 2024-11-01 PROCEDURE — 250N000011 HC RX IP 250 OP 636: Performed by: PHYSICIAN ASSISTANT

## 2024-11-01 PROCEDURE — 99232 SBSQ HOSP IP/OBS MODERATE 35: CPT | Mod: FS | Performed by: INTERNAL MEDICINE

## 2024-11-01 RX ORDER — HEPARIN SODIUM,PORCINE 10 UNIT/ML
5-10 VIAL (ML) INTRAVENOUS EVERY 24 HOURS
Status: DISCONTINUED | OUTPATIENT
Start: 2024-11-01 | End: 2024-11-01 | Stop reason: HOSPADM

## 2024-11-01 RX ORDER — HEPARIN SODIUM (PORCINE) LOCK FLUSH IV SOLN 100 UNIT/ML 100 UNIT/ML
5-10 SOLUTION INTRAVENOUS
Status: DISCONTINUED | OUTPATIENT
Start: 2024-11-01 | End: 2024-11-01 | Stop reason: HOSPADM

## 2024-11-01 RX ORDER — HEPARIN SODIUM,PORCINE 10 UNIT/ML
5-10 VIAL (ML) INTRAVENOUS
Status: DISCONTINUED | OUTPATIENT
Start: 2024-11-01 | End: 2024-11-01 | Stop reason: HOSPADM

## 2024-11-01 RX ADMIN — ISOSORBIDE MONONITRATE 30 MG: 30 TABLET, EXTENDED RELEASE ORAL at 08:18

## 2024-11-01 RX ADMIN — HEPARIN SODIUM (PORCINE) LOCK FLUSH IV SOLN 100 UNIT/ML 5 ML: 100 SOLUTION at 16:32

## 2024-11-01 RX ADMIN — LEVOTHYROXINE SODIUM 125 MCG: 100 TABLET ORAL at 06:49

## 2024-11-01 RX ADMIN — Medication 5 ML: at 06:26

## 2024-11-01 RX ADMIN — INSULIN ASPART 3 UNITS: 100 INJECTION, SOLUTION INTRAVENOUS; SUBCUTANEOUS at 08:33

## 2024-11-01 RX ADMIN — FENOFIBRATE 54 MG: 54 TABLET ORAL at 08:18

## 2024-11-01 RX ADMIN — ATORVASTATIN CALCIUM 40 MG: 40 TABLET, FILM COATED ORAL at 08:18

## 2024-11-01 ASSESSMENT — ACTIVITIES OF DAILY LIVING (ADL)
ADLS_ACUITY_SCORE: 0
DEPENDENT_IADLS:: INDEPENDENT
ADLS_ACUITY_SCORE: 0

## 2024-11-01 NOTE — CONSULTS
[facility] Nurse Inpatient Assessment     Consulted for: Left neck wound    Summary: Radiation wound to left neck    Patient History (according to provider note(s):       84 year old male with PMH significant for high grade neuroendocrine carcinoma, 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, diabetes, dyslipidemia, atrial fibrillation (on Eliquis), BRANDON, CKD stage III who was sent from oncology clinic for generalized weakness and dehydration with by bicytopenia and acute renal failure, admitted inpatient 10/30/24.     He has been following with Dr. Butler from oncology for his carcinoma and completed radiation therapy on 10/21 and is s/p chemotherapy on 10/18/24. He presented to clinic with generalized weakness, had a fall night prior to admission due to weakness with no head injury or loss of consciousness. He has had some dysphagia and although reports dysphagia getting better he has had poor PO intake. In the clinic he was noted to be dehydrated with severe anemia, thrombocytopenia and acute renal failure.    Assessment:      Areas visualized during today's visit: Face and neck    Wound location: Left neck    Last photo: 11/1/24    Wound due to:  radiation wound  Wound history/plan of care: Patient completed radiation to site on 10/21 and reports he has had this since then. He has been putting bacitracin on it at home for itching.   Wound base: 100 % Serous scabbing,     Palpation of the wound bed: normal      Drainage: none     Description of drainage: none     Measurements (length x width x depth, in cm): 8  x 13  x  0.1 cm      Tunneling: N/A     Undermining: N/A  Periwound skin: Dry/scaly      Color: normal and consistent with surrounding tissue      Temperature: normal   Odor: none  Pain: mild, tender  Pain interventions prior to dressing change: patient tolerated well and slow and gentle cares   Treatment goal: Heal  and Increase moisture    STATUS: initial assessment  Supplies ordered: supplies stored on unit, discussed with RN, and discussed with patient        Treatment Plan:     Left neck wound: Daily  Cleanse with Microklenz spray. Pat dry  Apply a thin layer of petroleum jelly to scabbed areas (Ok to switch to hydrocortisone cream if patient needs for itching)  Cover with 2 optifoam gentle border dressings.     Orders: Written    RECOMMEND PRIMARY TEAM ORDER: None, at this time  Education provided: plan of care  Discussed plan of care with: Patient and Nurse  WOC nurse follow-up plan: weekly  Notify WOC if wound(s) deteriorate.  Nursing to notify the Provider(s) and re-consult the WOC Nurse if new skin concern.    DATA:     Current support surface: Standard  Standard gel mattress (Isoflex)  Containment of urine/stool: Incontinence Protocol  BMI: There is no height or weight on file to calculate BMI.   Active diet order: Orders Placed This Encounter      Combination Diet Regular Diet Adult     Output: I/O last 3 completed shifts:  In: 1265 [P.O.:600]  Out: -      Labs:   Recent Labs   Lab 11/01/24  0622 10/31/24  1124 10/30/24  1337   ALBUMIN  --   --  3.8   HGB 9.0*  9.0*   < > 7.4*   WBC 4.4   < > 4.5    < > = values in this interval not displayed.     Pressure injury risk assessment:   Sensory Perception: 4-->no impairment  Moisture: 4-->rarely moist  Activity: 3-->walks occasionally  Mobility: 3-->slightly limited  Nutrition: 3-->adequate  Friction and Shear: 3-->no apparent problem  Jefferson Score: 20    Sylvia FRANKOCN   Dept. Vocera- Contact WO Nurse (Mikala) via  [employer]oc[employer]   Dept. Office Number: [phone]

## 2024-11-01 NOTE — PLAN OF CARE
Goal Outcome Evaluation:      Plan of Care Reviewed With: patient    Overall Patient Progress: improvingOverall Patient Progress: improving     [id number]  Discharge Note    Patient discharged to home via private vehicle  accompanied by daughter.  IV and Port : Discontinued and Heparinized and De-accessed   Prescriptions N/A.   Belongings reviewed and sent with patient and family.   Home medications returned to patient: NA  Equipment sent with: patient, N/A.   patient and family verbalizes understanding of discharge instructions. AVS given to patient and family.

## 2024-11-01 NOTE — PROGRESS NOTES
Care Management Discharge Note    Discharge Date: 11/01/2024       Discharge Disposition: Home    Discharge Services: None    Discharge DME: None    Discharge Transportation: car, drives self, family or friend will provide    Private pay costs discussed: Not applicable    Does the patient's insurance plan have a 3 day qualifying hospital stay waiver?  No    PAS Confirmation Code:    Patient/family educated on Medicare website which has current facility and service quality ratings: no    Education Provided on the Discharge Plan: Yes  Persons Notified of Discharge Plans: Patient   Patient/Family in Agreement with the Plan: yes    Handoff Referral Completed: No, handoff not indicated or clinically appropriate    Additional Information:  Spoke with , he is discharging the patient home. Patient does not want home care. No further care management intervention anticipated at this time.  Please re-consult if further needs arise.  Care management signing off.        JAKE Marinelli

## 2024-11-01 NOTE — PROGRESS NOTES
Hematology/Oncology Follow-up Note  [facility]    Date of Admission:  10/30/2024   Reason for Consult: Neuroendocrine cancer, pancytopenia   Primary Oncologist: Dr. Butler    ASSESSMENT/ PLAN : Pascual Perea is a 84 year old year old male who presented this hospitalization from the oncology clinic with generalized weakness, dehydration. Lab work revealing cytopenia, acute kidney failure.  They have a pertinent medical history of high grade neuroendocrine carcinoma, DM, Afib- on eliquis, BRANDON, CKD stage III    PLAN  Give one unit of platelet today for platelet count of 21K  Hemoglobin is stable to discharge at today  Okay to discharge after platelet infusion from oncology perspective  Plan to follow up for CBC and KOJO visit in cancer clinic early next week       High- grade neuroendocrine carcinoma, large cell type  6/20/2024 CT neck revealed 2.8 cm mass in left parotid gland. Also revealed multiple enlarged left-sided cervical lymph node   6/28/2024 Biopsy of left cervical LN METASTATIC HIGH-GRADE NEUROENDOCRINE CARCINOMA, LARGE CELL TYPE, at least 1.2 cm in linear extent, extending to biopsy margins.  7/19/2024 PET CT  hypermetabolic left parotid mass and multiple hypermetabolic left cervical lymphadenopathy. Severely stenotic left internal jugular vein secondary to mass effect.   Tumor board recommendation was to treat it as small cell lung cancer. Recommendation was to give him 4 cycle of platinum and etoposide and add radiation with cycle 2 or 3.   9/12/2024 CT Decreased size of the previously demonstrated biopsy-proven malignant left parotid mass. Decreased metastatic left cervical lymphadenopathy. No new or enlarging cervical lymphadenopathy identified. No evidence of metastatic disease within the chest, abdomen or pelvis.  Plan to get PET scan in 2-3 months to evaluate response to treatment given below   Treatment history   08/14/2024 Started Cycle 1 carboplatin and etoposide  9/4/2024 Cycle 2  "carboplatin and etoposide  9/9/2024 Radiation Initiated to left parotid    9/25/2024 Cycle 3 carboplatin and etoposide  10/16/2024 Cycle 4 carboplatin and etoposide  10/21/2024 Radiation completed to left parotid     DISCUSSION:  Denies fever, chills, night sweats. Denies nausea, vomiting, diarrhea, constipation. Denies lightheaded or dizziness. Denies chest pain, shortness of breath, cough. Denies headache, fatigue, lack of appetite. Denies bleeding, bruising, rashes.       PHYSICAL EXAM:  Vital signs:  Temp: 97.7  F (36.5  C) Temp src: Oral BP: 124/61 Pulse: 66   Resp: 18 SpO2: 98 % O2 Device: None (Room air)        Estimated body mass index is 24.13 kg/m  as calculated from the following:    Height as of 10/16/24: 1.803 m (5' 11\").    Weight as of an earlier encounter on 10/30/24: 78.5 kg (173 lb).    GENERAL/CONSTITUTIONAL: No acute distress.  NEUROLOGIC: Alert, oriented, answers questions appropriately.       Labs Reviewed: CBC, CMP       35 minutes spent on the date of the encounter doing review of outside records, review of test results, interpretation of tests, patient visit, coordinating care, and documentation     Haleigh POLLACK CNP  Hematology/Oncology  [facility]     Securely message with [employer]   Pager #[phone]    "

## 2024-11-01 NOTE — CONSULTS
"CLINICAL NUTRITION SERVICES  -  ASSESSMENT NOTE      Recommendations:     Discussed importance of good nutrition for recovery.  Recommended pt continue with protein supplements at home.  Encouraged eating throughout the day (small, frequent meals)  Pt may order supplements prn - reviewed supplements available          Malnutrition:   % Weight Loss:  > 5% in 1 month (severe malnutrition) - wt is down ~15% in the past 6 weeks  % Intake:  </= 75% for >/= 1 month (severe malnutrition)  Subcutaneous Fat Loss:  None observed  Muscle Loss:  Clavicle bone region moderate depletion  Fluid Retention:  None noted    Malnutrition Diagnosis: Severe malnutrition  In Context of:  Acute illness or injury        REASON FOR ASSESSMENT  Pascual Perea is a 84 year old male seen by Registered Dietitian for Admission Nutrition Risk Screen:  Have you recently lost weight without trying? \"UNSURE\"  Have you been eating poorly because of a decreased appetite? \"YES\"        NUTRITION HISTORY  Chart reviewed  Pt tells me that while he was receiving chemo and RT, his po intake was decreased  \"Didn't have an appetite and wasn't able to swallow\"  States that for ~6 weeks, he was mainly taking broth and a protein powder mixed with water  Feels that his appetite is finally coming back and he is able to tolerate food/liquids  Does have some taste changes - \"water tastes bad\"      CURRENT NUTRITION ORDERS  Diet Order:     Regular    Ate 100% breakfast - omelet, blueberry muffin, coffee  Hoping to go home soon        NUTRITION FOCUSED PHYSICAL ASSESSMENT FOR DIAGNOSING MALNUTRITION)  Yes         Observed:    Muscle wasting (refer to documentation in Malnutrition section)    Obtained from Chart/Interdisciplinary Team:  High-grade neuroendocrine carcinoma, 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 - completed radiation therapy on 10/21 and is s/p chemotherapy on 10/18/24     ANTHROPOMETRICS  Height: 5'11\"  Weight: no " wt this admit  IBW: 78.2 kg  Weight History: Pt confirms that he has lost ~30# (15%) in the past 6 weeks while going through CA treatment  Wt Readings from Last 10 Encounters:   10/30/24 78.5 kg (173 lb)   10/16/24 85.7 kg (189 lb)   10/16/24 85.8 kg (189 lb 3.2 oz)   09/27/24 93.4 kg (205 lb 14.6 oz)   09/25/24 93.4 kg (205 lb 12.8 oz)   09/17/24 95.3 kg (210 lb)   09/05/24 96.3 kg (212 lb 6.4 oz)   09/04/24 94.5 kg (208 lb 6.4 oz)   08/14/24 93 kg (205 lb)   08/14/24 93 kg (205 lb)         LABS  Labs reviewed    MEDICATIONS  Medications reviewed      ASSESSED NUTRITION NEEDS PER APPROVED PRACTICE GUIDELINES:    Dosing Weight 78.5 kg (10/30)  Estimated Energy Needs: 3365-2856 kcals (25-30 Kcal/Kg)  Justification: maintenance  Estimated Protein Needs:  grams protein (1.2-1.5 g pro/Kg)  Justification: Repletion  Estimated Fluid Needs: 9367-4817  mL (1 mL/Kcal)  Justification: maintenance    MALNUTRITION:  % Weight Loss:  > 5% in 1 month (severe malnutrition) - wt is down ~15% in the past 6 weeks  % Intake:  </= 75% for >/= 1 month (severe malnutrition)  Subcutaneous Fat Loss:  None observed  Muscle Loss:  Clavicle bone region moderate depletion  Fluid Retention:  None noted    Malnutrition Diagnosis: Severe malnutrition  In Context of:  Acute illness or injury    NUTRITION DIAGNOSIS:  Inadequate oral intake related to difficulty swallowing and decreased appetite with CA treatment as evidenced by pt with 15% wt loss past 6 weeks      NUTRITION INTERVENTIONS  Recommendations / Nutrition Prescription  Regular diet  .      Implementation  Nutrition education: Discussed importance of good nutrition for recovery.  Recommended pt continue with protein supplements at home.  Encouraged eating throughout the day (small, frequent meals)  Pt may order supplements prn - reviewed supplements available   .      Nutrition Goals  Pt to consume 75% meals TID  .      MONITORING AND EVALUATION:  Progress towards goals will be  monitored and evaluated per protocol and Practice Guidelines

## 2024-11-01 NOTE — PLAN OF CARE
0128 - [id number]    Orientation: A&Ox4, [place] - uses bilateral HAs    Aggression Stop Light: Green    Activity: SBA w/GB+cane    Diet/BS Checks: Regular, BG checks - 225, 171    Tele: N/A    IV Access/Drains: R port HL, good blood return    Pain Management: Denies pain    Abnormal VS/Results: VSS on RA    Bowel/Bladder: Continent    Skin/Wounds: Radiation scab to L neck, scattered bruising    Consults: Hem/Onc, PT, SW    D/C Disposition: Pending improvement    Other Info:   - 1 unit PRBC transfused, tolerated well

## 2024-11-01 NOTE — DISCHARGE SUMMARY
[facility]  Hospitalist Discharge Summary      Date of Admission:  10/30/2024  Date of Discharge:  11/1/2024  Discharging Provider: Kolton Christensen MD  Discharge Service: Hospitalist Service    Discharge Diagnoses     Generalized weakness  dehydration with acute renal failure on CKD stage III  Hypokalemia  high anion gap    Clinically Significant Risk Factors     # DMII: A1C = N/A within past 6 months  # Severe Malnutrition: based on nutrition assessment      Follow-ups Needed After Discharge   Follow-up Appointments       Follow-up and recommended labs and tests       Follow up with primary care provider, Andrew Elizalde, within 7 days for hospital follow- up.  The following labs/tests are recommended: CBC.                Unresulted Labs Ordered in the Past 30 Days of this Admission       No orders found from 9/30/2024 to 10/31/2024.            Discharge Disposition   Discharged to home  Condition at discharge: Stable    Hospital Course   Pascual Perea is a 84 year old male with PMH significant for high grade neuroendocrine carcinoma, 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, diabetes, dyslipidemia, atrial fibrillation (on Eliquis), BRANDON, CKD stage III who was sent from oncology clinic for generalized weakness and dehydration with by bicytopenia and acute renal failure, admitted inpatient 10/30/24.     He has been following with Dr. Butler from oncology for his carcinoma and completed radiation therapy on 10/21 and is s/p chemotherapy on 10/18/24. He presented to clinic with generalized weakness, had a fall night prior to admission due to weakness with no head injury or loss of consciousness. He has had some dysphagia and although reports dysphagia getting better he has had poor PO intake. In the clinic he was noted to be dehydrated with severe anemia, thrombocytopenia and acute renal failure.     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 Butler; diagnosed 06/2024; completed radiation to left parotid between 9/9/24 and 10/21/24  - s/p chemotherapy with carboplatin and etoposide four cycles between 8/14/24 and 10/18/24  -CBC noted WBC 4.5, hemoglobin 7.4, platelet 26  -discussed with Dr. Butler, will transfuse CA BC for Hb <8  -oncology suggested to hold off on platelet transfusion until platelet count <20  -will monitor CBC, transfuse 1U pRBC today for Hgb 6.8  -Hgb stable at discharge 10.0  -Transfused 1u plts prior to discharge per heme recs     Diabetes mellitus with hyperglycemia  -blood glucose markedly elevated in 400s  -PTA on Lantus 30 units BID and NovoLog 10-20 units TID with meals -> resumed     Atrial fibrillation  - since no suggestion of active bleed even though with severe anemia probably would be okay kendall continue with PTA Eliquis but platelet counts too low as well  - will hold off on PTA Eliquis for now -> may resume once PLT above 50K.   - rate controlled and not on any rate controlling medications PTA        Consultations This Hospital Stay   CARE MANAGEMENT / SOCIAL WORK IP CONSULT  HEMATOLOGY & ONCOLOGY IP CONSULT  PHYSICAL THERAPY ADULT IP CONSULT  WOUND OSTOMY CONTINENCE NURSE  IP CONSULT    Code Status   Full Code    Time Spent on this Encounter   I, Kolton Christensen MD, personally saw the patient today and spent greater than 30 minutes discharging this patient.       Kolton Christensen MD  [place] [address] ONCOLOGY  [facility] [address]  [address]  [place] [address]  Phone: [phone]  ______________________________________________________________________    Physical Exam   Vital Signs: Temp: 98  F (36.7  C) Temp src: Oral BP: 115/55 Pulse: 65   Resp: 18 SpO2: 96 % O2 Device: None (Room air)    Weight: 0 lbs 0 oz  ----------------------------------------------------------------------------------------       Primary Care Physician   Andrew Elizalde    Discharge Orders      Primary Care - Care Coordination Referral      Reason for your hospital stay    You had weakness and low blood levels of hemoglobin and platelets.     Follow-up and recommended labs and tests     Follow up with primary care provider, Andrew Elizalde, within 7 days for hospital follow- up.  The following labs/tests are recommended: CBC.     Activity    Your activity upon discharge: activity as tolerated     Walker Order     Diet    Follow this diet upon discharge: Current Diet:Orders Placed This Encounter      Room Service      Snacks/Supplements Adult: Other; may order supplements prn; With Meals      Combination Diet Regular Diet Adult       Significant Results and Procedures   Most Recent 3 CBC's:  Recent Labs   Lab Test 11/01/24  0622 10/31/24  1755 10/31/24  1124 10/30/24  1337   WBC 4.4  --  3.8* 4.5   HGB 9.0*  9.0* 8.0* 6.8* 7.4*   MCV 91  --  91 91   PLT 21*  --  21* 26*     Most Recent 3 BMP's:  Recent Labs   Lab Test 11/01/24  1136 11/01/24  0746 11/01/24  0622 10/31/24  0716 10/31/24  0609 10/31/24  0210 10/31/24  0031 10/30/24  1644 10/30/24  1337   NA  --   --  139  --  141  --   --   --  134*   POTASSIUM  --   --  3.9  --  3.6  3.6  --  3.1*   < > 3.2*   CHLORIDE  --   --  105  --  105  --   --   --  93*   CO2  --   --  26  --  30*  --   --   --  22   BUN  --   --  20.6  --  35.4*  --   --   --  44.9*   CR  --   --  1.10  --  1.46*  --   --   --  1.95*   ANIONGAP  --   --  8  --  6*  --   --   --  19*   STARR  --   --  9.1  --  9.0  --   --   --  9.8   * 174*  184*   < > 79   < >  --    < > 466*    < > = values in this interval not displayed.     Most Recent 2 LFT's:  Recent Labs   Lab Test 10/30/24  1337 10/16/24  1329   AST 16 19   ALT 19 14   ALKPHOS 92 75   BILITOTAL 1.0 0.6     Most Recent 3 INR's:  Recent Labs   Lab Test 05/22/19  0948 03/17/19  0541 03/14/19  0847   INR 1.43* 1.09 1.37*     Most Recent 3 Troponin's:  Recent Labs   Lab Test 06/17/21  1258 02/18/20  0926 01/09/20  0627   TROPI <0.015 <0.015 <0.015     Most Recent 3 BNP's:  Recent Labs   Lab Test 10/14/22  2313 02/19/22  1010 12/25/21  1145 01/08/20  1514 04/19/19  0848   NTBNPI 1,781 1,449 7,439*   < >  --    NTBNP  --   --   --   --  985*    < > = values in this interval not displayed.     Most Recent D-dimer:No lab results found.  Most Recent Cholesterol Panel:  Recent Labs   Lab Test 04/17/24  0832   CHOL 144   LDL 43   HDL 36*   TRIG 324*     7-Day Micro Results       No results found for the last 168 hours.          Most Recent TSH and T4:  Recent Labs   Lab Test 06/05/24  0858 05/15/24  1234 04/17/24  0832   TSH 2.59   < > 4.36*   T4  --   --  1.30    < > = values in this interval not displayed.     Most Recent ESR & CRP:  Recent Labs   Lab Test 01/10/22  1528   SED 6   ,   Results for orders placed or performed during the hospital encounter of 09/12/24   CT Soft Tissue Neck w Contrast    Narrative    CT SCAN OF THE NECK WITH CONTRAST  9/12/2024 12:32 PM     HISTORY: Malignant poorly differentiated neuroendocrine carcinoma (H).    TECHNIQUE: Axial CT images of the neck following administration of  intravenous contrast with reformations. Radiation dose for this scan  was reduced using automated exposure control, adjustment of the mA  and/or kV according to patient size, or iterative reconstruction  technique. 0 IV.     COMPARISON: CT of the neck dated 6/20/2024. Correlation is also made  with PET CT 7/19/2024.     FINDINGS: Interval decrease in size of the previously demonstrated  mass in the  left parotid gland now measuring approximately 19 mm  transverse by 10 mm AP by 20 mm craniocaudal (series 2 image 46,  series 5 image 52) compared to prior CT when it measured 27 mm x 22 mm  x 31 mm.    Decreased left cervical lymphadenopathy. For example, a dominant left  level IIa lymph node mass that previously measured 34 mm in axial  plane now measures 23 mm (series 3 image 55). An additional more  anteriorly positioned left level IIa lymph node mass measures 17 mm in  axial plane compared to 27 mm previously (series 3 image 59). A left  level III lymph node measures approximately 16 mm (series 3 image 72)  compared to 24 mm previously. Other previously seen enlarged lymph  nodes in the left level II through IV and supraclavicular zones have  otherwise decreased in size in a similar fashion. No new or enlarging  cervical lymphadenopathy is identified.    Evaluation of the oral cavity/floor of mouth is somewhat limited due  to streak artifact from the patient's dental amalgam. Visualized  aspects of the oral cavity/floor of mouth structures appear within  normal limits. No mucosal space mass or inflammatory change  identified. Normal appearance of the larynx on CT. Normal appearance  of the trachea.    Changes of bilateral cataract surgery are noted. Atherosclerotic  calcifications involving the bilateral carotid siphons. The bilateral  submandibular glands and the right parotid gland appear within normal  limits. No dominant thyroid mass or nodule identified.    Right anterior chest wall Port-A-Cath device with catheter tubing  extending into the right internal jugular vein and superior vena cava,  incompletely evaluated. The visualized lung apices appear clear.  Changes of previous median sternotomy are partially visualized.  Multilevel degenerative changes are noted throughout the cervical  spine. No definite aggressive appearing osseous lesion identified.      Impression    IMPRESSION:  1. Decreased size of  the previously demonstrated biopsy-proven  malignant left parotid mass.  2. Decreased metastatic left cervical lymphadenopathy. No new or  enlarging cervical lymphadenopathy identified.     TONIE MCDONALD MD         SYSTEM ID:  [id number]   CT Chest/Abdomen/Pelvis w Contrast    Narrative    CT CHEST/ABDOMEN/PELVIS WITH CONTRAST September 10, 2024 12:32 PM    CLINICAL HISTORY: Malignant poorly differentiated neuroendocrine  carcinoma (H).    TECHNIQUE: CT scan of the chest, abdomen, and pelvis was performed  following injection of IV contrast. Multiplanar reformats were  obtained. Dose reduction techniques were used.   CONTRAST: 120 mL Isovue-370.    COMPARISON: PET/CT 7/19/2024.    FINDINGS:   LOWER NECK: CT neck is dictated separately.    LUNGS AND PLEURA: No focal consolidation or pleural effusion. Similar  right middle lobe 3 mm nodule (5/174). No new or growing suspicious  pulmonary nodule.    AIRWAYS: Patent.    HEART: No pericardial effusion.  Severe coronary calcifications. No  thoracic aortic aneurysm. Aortic valve calcification.    PULMONARY ARTERIES: Unremarkable.    MEDIASTINUM AND CONY: Unremarkable. Right chest port.    HEPATOBILIARY: No suspicious liver lesion. Cholecystectomy.     SPLEEN: Borderline splenectomy.    PANCREAS: Unremarkable.    ADRENAL GLANDS: Unremarkable.    KIDNEYS/URETERS/BLADDER: No collecting system dilatation. Similar  urinary bladder wall thickening.    BOWEL: No bowel dilatation or wall thickening.    LYMPH NODES AND PERITONEUM: Unremarkable.    VASCULATURE: Nonaneurysmal abdominal aorta.    PELVIS: No pelvic mass.    MUSCULOSKELETAL: Median sternotomy. Similar T4 vertebral body  deformity. No aggressive osseous lesion.    ADDITIONAL FINDINGS: None.      Impression    IMPRESSION:  1.  No evidence of metastatic disease within the chest, abdomen or  pelvis.  2.  CT neck is dictated separately.    IMTIAZ SIMMONS MD         SYSTEM ID:  [id number]     *Note: Due to a large number  of results and/or encounters for the requested time period, some results have not been displayed. A complete set of results can be found in Results Review.       Discharge Medications   Current Discharge Medication List        CONTINUE these medications which have NOT CHANGED    Details   atorvastatin (LIPITOR) 40 MG tablet TAKE 1 TABLET (40 MG) BY MOUTH DAILY  Qty: 90 tablet, Refills: 2    Associated Diagnoses: Hyperlipidemia LDL goal <70      azelastine 137 MCG/SPRAY SOLN [place] 2 sprays into both nostrils 2 times daily.  Qty: 30 mL, Refills: 1    Associated Diagnoses: Chronic rhinitis      fenofibrate micronized (LOFIBRA) 67 MG capsule TAKE 1 CAPSULE BY MOUTH EVERY MORNING BEFORE BREAKFAST  Qty: 90 capsule, Refills: 3    Associated Diagnoses: Hyperlipidemia LDL goal <70      Insulin Aspart FlexPen 100 UNIT/ML SOPN Inject 6-10 Units Subcutaneous 3 times daily (with meals) Plus priming    Associated Diagnoses: Type 2 diabetes mellitus with stage 3b chronic kidney disease, with long-term current use of insulin (H)      insulin glargine (LANTUS SOLOSTAR) 100 UNIT/ML pen INJECT 31 UNITS UNDER THE SKIN EVERY 12 HOURS  Qty: 30 mL, Refills: 4    Comments: If Lantus is not covered by insurance, may substitute Basaglar or Semglee or other insulin glargine product per insurance preference at same dose and frequency.    Associated Diagnoses: Type 2 diabetes mellitus with stage 3b chronic kidney disease, with long-term current use of insulin (H)      isosorbide mononitrate (IMDUR) 30 MG 24 hr tablet Take 1 tablet (30 mg) by mouth daily  Qty: 90 tablet, Refills: 3    Associated Diagnoses: Atherosclerosis of native coronary artery of native heart with angina pectoris (H)      levothyroxine (SYNTHROID/LEVOTHROID) 125 MCG tablet Take 1 tablet (125 mcg) by mouth daily  Qty: 90 tablet, Refills: 3    Comments: Stop refills of Levothyroxine 112mcg tabs  Associated Diagnoses: Hypothyroidism, unspecified type      blood glucose  (ACCU-CHEK NORMAN PLUS) test strip USE TO TEST BLOOD SUGAR 3 TIMES A DAY OR AS DIRECTED  Qty: 100 strip, Refills: 2    Associated Diagnoses: Type 2 diabetes mellitus without complication, with long-term current use of insulin (H)      blood glucose calibration (ACCU-CHEK NORMAN) solution USE AS DIRECTED  Qty: 1 each, Refills: 0    Comments: Medication is being filled for 1 time refill only due to:  Due for office visit  Associated Diagnoses: Type 2 diabetes mellitus without complication, with long-term current use of insulin (H)      Continuous Blood Gluc  (FREESTYLE DELFINA 2 READER) CHRYSTAL 1 Device continuous  Qty: 1 each, Refills: 1    Associated Diagnoses: Type 2 diabetes mellitus without complication, with long-term current use of insulin (H)      Continuous Blood Gluc Sensor (FREESTYLE DELFINA 2 SENSOR) MISC APPLY 1 SENSOR AND CHANGE EVERY 14 DAYS AS DIRECTED  Qty: 6 each, Refills: 3    Associated Diagnoses: Type 2 diabetes mellitus without complication, with long-term current use of insulin (H)      glucose 4 g CHEW Take 1 tablet by mouth every hour as needed for low blood sugar       insulin pen needle (GLOBAL EASE INJECT PEN NEEDLES) 31G X 5 MM miscellaneous USE 1 PENTIP AS DIRECTED 4 TIMES A DAY  Qty: 400 each, Refills: 3    Associated Diagnoses: Type 2 diabetes mellitus without complication, with long-term current use of insulin (H)      nitroGLYcerin (NITROSTAT) 0.4 MG sublingual tablet Place 1 tablet (0.4 mg) under the tongue every 5 minutes as needed for chest pain  Qty: 25 tablet, Refills: 3    Associated Diagnoses: Atherosclerosis of native coronary artery of native heart with angina pectoris (H)           STOP taking these medications       apixaban ANTICOAGULANT (ELIQUIS ANTICOAGULANT) 5 MG tablet Comments:   Reason for Stopping:         torsemide (DEMADEX) 10 MG tablet Comments:   Reason for Stopping:         torsemide (DEMADEX) 20 MG tablet Comments:   Reason for Stopping:             Allergies    Allergies   Allergen Reactions    Omeprazole      diarrhea    Pantoprazole      diarrhea

## 2024-11-01 NOTE — PROVIDER NOTIFICATION
MD Notification    Notified Person: MD    Notified Person Name: Dr. Acevedoynh     Notification Date/Time: 0826 11/01/24    Notification Interaction: [employer] webpage    Purpose of Notification: is there something we can put on the radiated skin to ease the itchiness and discomfort? benadryl?      Orders Received:    Comments:

## 2024-11-01 NOTE — PROGRESS NOTES
"   11/01/24 1049   Appointment Info   Signing Clinician's Name / Credentials (PT) Rebekah Craig PT, DPT   Living Environment   People in Home child(sharita), adult   Current Living Arrangements independent living facility   Home Accessibility no concerns   Transportation Anticipated car, drives self;family or friend will provide   Living Environment Comments son works outside of the home   Self-Care   Usual Activity Tolerance moderate   Current Activity Tolerance moderate   Equipment Currently Used at Home cane, straight;shower chair   Fall history within last six months yes   Number of times patient has fallen within last six months 1   Activity/Exercise/Self-Care Comment Pt typically Mark using SEC for mobility and ADLs. Shares cooking and cleaning duties with son.   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     He has been following with Dr. Butler from oncology for his carcinoma and completed radiation therapy on 10/21 and is s/p chemotherapy on 10/18/24. He presented to clinic with generalized weakness, had a fall night prior to admission due to weakness with no head injury or loss of consciousness. He has had some dysphagia and although reports dysphagia getting better he has had poor PO intake. In the clinic he was noted to be dehydrated with severe anemia, " "thrombocytopenia and acute renal failure.  \"   Existing Precautions/Restrictions fall   Cognition   Affect/Mental Status (Cognition) WFL   Orientation Status (Cognition) oriented x 3   Follows Commands (Cognition) WFL   Pain Assessment   Patient Currently in Pain No   Integumentary/Edema   Integumentary/Edema Comments dressing over wound on L side of neck   Posture    Posture Forward head position   Range of Motion (ROM)   Range of Motion ROM is WFL   Strength (Manual Muscle Testing)   Strength (Manual Muscle Testing) Deficits observed during functional mobility   Strength Comments mildly decreased functional LE strength, no focal deficits noted   Bed Mobility   Bed Mobility supine-sit   Comment, (Bed Mobility) SBA   Transfers   Transfers sit-stand transfer   Comment, (Transfers) CGA from low bed height using SEC   Gait/Stairs (Locomotion)   Comment, (Gait/Stairs) CGA for gait with SEC, mild instability though no overt LOB noted   Balance   Balance Comments falls risk, benefits from use of AD for UE support with mobility   Sensory Examination   Sensory Perception Comments baseline neuropathy in B feet   Clinical Impression   Criteria for Skilled Therapeutic Intervention Yes, treatment indicated   PT Diagnosis (PT) impaired IND with functional mobility   Influenced by the following impairments impaired functional strength, balance, activity tolerance   Functional limitations due to impairments impaired transfers, ambulation   Clinical Presentation (PT Evaluation Complexity) stable   Clinical Presentation Rationale Based on current presentation, PMH, social support   Clinical Decision Making (Complexity) low complexity   Planned Therapy Interventions (PT) balance training;bed mobility training;gait training;home exercise program;patient/family education;strengthening;transfer training;progressive activity/exercise;home program guidelines   Risk & Benefits of therapy have been explained evaluation/treatment results " reviewed;care plan/treatment goals reviewed;risks/benefits reviewed;current/potential barriers reviewed;participants voiced agreement with care plan;participants included;patient   PT Total Evaluation Time   PT Eval, Low Complexity Minutes (60378) 10   Physical Therapy Goals   PT Frequency 5x/week   PT Predicted Duration/Target Date for Goal Attainment 11/08/24   PT Goals Bed Mobility;Transfers;Gait   PT: Bed Mobility Modified independent;Supine to/from sit   PT: Transfers Modified independent;Sit to/from stand;Bed to/from chair;Assistive device   PT: Gait Modified independent;Assistive device;Straight cane;100 feet   Interventions   Interventions Quick Adds Gait Training;Therapeutic Activity   Therapeutic Activity   Therapeutic Activities: dynamic activities to improve functional performance Minutes (29989) 8   Treatment Detail/Skilled Intervention Pt needing to toilet at beginning of session. SBA for toilet transfer. Able to manage pericares and brief with SBA, completes hand hygiene with SBA. Pt completed further sit<>Stands following eval with SBA at FWW. Sit>sup SBA. Discussed benefits of OOB mobility. Time spent to set up pt recliner. Encouraged that he sit up in chair for meals and amb in halls with nursing staff again later today. Pt remained supine with alarm armed and all needs in reach.   Gait Training   Gait Training Minutes (42086) 10   Symptoms Noted During/After Treatment (Gait Training) fatigue   Treatment Detail/Skilled Intervention 2 bouts of gait. First with SEC and CGA. Small LOB when navigating obstacles in schuler, able to self-correct with CGA. Second bout with FWW, SBA. Pt cued for safe walker use and upright posture. Improved stability with use of FWW, discussed benefits of this device.   Distance in Feet 250' x2   PT Discharge Planning   PT Plan progress gait with FWW vs cane, sit<>stand reps, balance tasks   PT Discharge Recommendation (DC Rec) home with home care physical therapy   PT  Rationale for DC Rec Pt slightly below baseline for functional mobility. Currently CGA with SEC, SBA with FWW for hallway gait. Anticipate with further IP PT and mobilization with nursing staff, pt can return home with HHPT to address continued mobility deficits. Currently rec use of FWW for increased stability, pt will need one issued at discharge.   PT Brief overview of current status Ax1 FWW. Goals of therapy will be to address safe mobility and make recs for d/c to next level of care. Pt and RN will continue to follow all falls risk precautions as documented by RN staff while hospitalized.   PT Equipment Needed at Discharge walker, rolling   Physical Therapy Time and Intention   Timed Code Treatment Minutes 18   Total Session Time (sum of timed and untimed services) 28

## 2024-11-01 NOTE — PROGRESS NOTES
Orientation: A&Ox4  Aggression Stop Light: green  Activity: 1A GB cane  Diet/BS Checks: regular, achs BG checks  Tele:  N/A  IV Access/Drains: right port infusing 1 unit RBC otherwise HL  Pain Management: denies pain  Abnormal VS/Results: VSS on RA. Q12H hgb, 8 at 1800. AM hgb 6.8, given 1u RBC, uneventful. Plt 21, infuse if less than 20. K+ recheck in AM, no replacement needed  Bowel/Bladder: continent  Skin/Wounds: radiation scabs to left neck, WOC consult ordered  Consults: hem/onc, SW, PT, WOC  D/C Disposition: pending  Other Info: 1 unit of blood transfusing

## 2024-11-01 NOTE — PLAN OF CARE
Goal Outcome Evaluation:      Plan of Care Reviewed With: patient    Overall Patient Progress: no changeOverall Patient Progress: no change    Outcome Evaluation: Waiting on PT reccomendations for safe dischare plan.

## 2024-11-04 ENCOUNTER — LAB (OUTPATIENT)
Dept: INFUSION THERAPY | Facility: CLINIC | Age: 84
End: 2024-11-04
Attending: NURSE PRACTITIONER
Payer: COMMERCIAL

## 2024-11-04 ENCOUNTER — PATIENT OUTREACH (OUTPATIENT)
Dept: CARE COORDINATION | Facility: CLINIC | Age: 84
End: 2024-11-04

## 2024-11-04 ENCOUNTER — ONCOLOGY VISIT (OUTPATIENT)
Dept: ONCOLOGY | Facility: CLINIC | Age: 84
End: 2024-11-04
Attending: NURSE PRACTITIONER
Payer: COMMERCIAL

## 2024-11-04 VITALS
WEIGHT: 177 LBS | OXYGEN SATURATION: 97 % | HEART RATE: 62 BPM | SYSTOLIC BLOOD PRESSURE: 116 MMHG | RESPIRATION RATE: 16 BRPM | BODY MASS INDEX: 24.69 KG/M2 | DIASTOLIC BLOOD PRESSURE: 63 MMHG

## 2024-11-04 DIAGNOSIS — C7A.1 HIGH GRADE NEUROENDOCRINE CARCINOMA (H): Primary | ICD-10-CM

## 2024-11-04 DIAGNOSIS — N17.9 AKI (ACUTE KIDNEY INJURY) (H): ICD-10-CM

## 2024-11-04 DIAGNOSIS — E86.0 DEHYDRATION: ICD-10-CM

## 2024-11-04 LAB
BASOPHILS # BLD AUTO: 0 10E3/UL (ref 0–0.2)
BASOPHILS NFR BLD AUTO: 0 %
EOSINOPHIL # BLD AUTO: 0 10E3/UL (ref 0–0.7)
EOSINOPHIL NFR BLD AUTO: 0 %
ERYTHROCYTE [DISTWIDTH] IN BLOOD BY AUTOMATED COUNT: 17.7 % (ref 10–15)
HCT VFR BLD AUTO: 26 % (ref 40–53)
HGB BLD-MCNC: 8.7 G/DL (ref 13.3–17.7)
IMM GRANULOCYTES # BLD: 0 10E3/UL
IMM GRANULOCYTES NFR BLD: 1 %
LYMPHOCYTES # BLD AUTO: 0.2 10E3/UL (ref 0.8–5.3)
LYMPHOCYTES NFR BLD AUTO: 7 %
MCH RBC QN AUTO: 31.2 PG (ref 26.5–33)
MCHC RBC AUTO-ENTMCNC: 33.5 G/DL (ref 31.5–36.5)
MCV RBC AUTO: 93 FL (ref 78–100)
MONOCYTES # BLD AUTO: 0.3 10E3/UL (ref 0–1.3)
MONOCYTES NFR BLD AUTO: 9 %
NEUTROPHILS # BLD AUTO: 2.7 10E3/UL (ref 1.6–8.3)
NEUTROPHILS NFR BLD AUTO: 82 %
NRBC # BLD AUTO: 0 10E3/UL
NRBC BLD AUTO-RTO: 0 /100
PLATELET # BLD AUTO: 55 10E3/UL (ref 150–450)
RBC # BLD AUTO: 2.79 10E6/UL (ref 4.4–5.9)
WBC # BLD AUTO: 3.2 10E3/UL (ref 4–11)

## 2024-11-04 PROCEDURE — G0463 HOSPITAL OUTPT CLINIC VISIT: HCPCS | Performed by: NURSE PRACTITIONER

## 2024-11-04 PROCEDURE — 36591 DRAW BLOOD OFF VENOUS DEVICE: CPT

## 2024-11-04 PROCEDURE — 250N000011 HC RX IP 250 OP 636: Performed by: INTERNAL MEDICINE

## 2024-11-04 PROCEDURE — 85004 AUTOMATED DIFF WBC COUNT: CPT | Performed by: NURSE PRACTITIONER

## 2024-11-04 PROCEDURE — 99214 OFFICE O/P EST MOD 30 MIN: CPT | Performed by: NURSE PRACTITIONER

## 2024-11-04 RX ORDER — HEPARIN SODIUM (PORCINE) LOCK FLUSH IV SOLN 100 UNIT/ML 100 UNIT/ML
5 SOLUTION INTRAVENOUS
Status: CANCELLED | OUTPATIENT
Start: 2024-11-04

## 2024-11-04 RX ORDER — HEPARIN SODIUM (PORCINE) LOCK FLUSH IV SOLN 100 UNIT/ML 100 UNIT/ML
5 SOLUTION INTRAVENOUS
Status: DISCONTINUED | OUTPATIENT
Start: 2024-11-04 | End: 2024-11-04 | Stop reason: HOSPADM

## 2024-11-04 RX ADMIN — Medication 5 ML: at 09:35

## 2024-11-04 ASSESSMENT — PAIN SCALES - GENERAL: PAINLEVEL_OUTOF10: NO PAIN (0)

## 2024-11-04 NOTE — LETTER
"11/4/2024      Pascual Perea  [address]  [place] [address]      Dear Colleague,    Thank you for referring your patient, Pascual Perea, to the [facility]. Please see a copy of my visit note below.    Oncology Rooming Note    November 4, 2024 9:29 AM   Pascual Perea is a 84 year old male who presents for:    Chief Complaint   Patient presents with     Oncology Clinic Visit     Initial Vitals: There were no vitals taken for this visit. Estimated body mass index is 24.13 kg/m  as calculated from the following:    Height as of 10/16/24: 1.803 m (5' 11\").    Weight as of 10/30/24: 78.5 kg (173 lb). There is no height or weight on file to calculate BSA.  Data Unavailable Comment: Data Unavailable   No LMP for male patient.  Allergies reviewed: Yes  Medications reviewed: Yes    Medications: Medication refills not needed today.  Pharmacy name entered into [facility]: [facility] - [place] - [address]    Frailty Screening:   Is the patient here for a new oncology consult visit in cancer care? 2. No      Clinical concerns:   np was notified.      Lara Banerjee, [facility]              Oncology/Hematology Visit Note  Nov 4, 2024    Reason for Visit: follow up of high-grade neuroendocrine carcinoma  Large cell type involving left parotid gland and left cervical lymph node  Status post concurrent chemoradiation carboplatin etoposide for 4 cycles 08/14/2024 and 10/18/2024 (carboplatin chosen because of CKD, hearing loss and his age).   Status post radiation to left parotid between 09/09/2024 and 10/21/2024. 6000 cGy in 30 fractions.     Interval History:  Patient reports overall feeling well.  Denies bleeding bruising denies fever chills sweats cough shortness of breath chest pain nausea vomiting diarrhea abdominal pain or bleed  Denies dysphagia reports taking small frequent meals.    Review of Systems:  14 point ROS of systems including Constitutional, Eyes, " Respiratory, Cardiovascular, Gastroenterology, Genitourinary, Integumentary, Muscularskeletal, Psychiatric were all negative except for pertinent positives noted in my HPI.      Physical Examination:  General: The patient is a pleasant male in no acute distress.  /63   Pulse 62   Resp 16   Wt 80.3 kg (177 lb)   SpO2 97%   BMI 24.69 kg/m    HEENT: EOMI, PERRL. Sclerae are anicteric. Oral mucosa is pink and moist with no lesions or thrush.   Lymph: Neck is supple with no lymphadenopathy in the cervical or supraclavicular areas.   Heart: Regular rate and rhythm.   Lungs: Clear to auscultation bilaterally.   GI: Bowel sounds present, soft, nontender with no palpable hepatosplenomegaly or masses.   Extremities: No lower extremity edema noted bilaterally.   Skin: No rashes, petechiae, or bruising noted on exposed skin.    Laboratory Data:    CBC with differential reviewed    Assessment and Plan:    high-grade neuroendocrine carcinoma  Large cell type involving left parotid gland and left cervical lymph nodes   Patient follows with Dr. Butler  Status post concurrent chemoradiation  S/p carboplatin etoposide for 4 cycles 08/14/2024 and 10/18/2024 (carboplatin chosen because of CKD, hearing loss and his age).   Status post radiation to left parotid between 09/09/2024 and 10/21/2024. 6000 cGy in 30 fractions.   Recent hospitalization with weakness and dehydration  -Patient reports he is feeling significant improvement since the discharge  CBC reviewed-  Please schedule CBC with differential and follow-up with me next  Schedule for PET scan first week of January and follow-up with Dr. Butler  Recheck CBC with differential and CMP next week and follow-up with me     A-fib  Eliquis on hold due to thrombocytopenia  Platelets are above 50 okay to restart Eliquis.  Patient denies bleeding or bruising  In the event of bleeding bruising fall injury go to ER or call 911    Leukopenia  Normal ANC check temperature frequently  in the event of fever chills sweats or any signs symptoms of infection go to ER  Recheck CBC with differential next week and follow-up with    Anemia  Patient denies bleeding  Recheck CBC next    Thrombocytopenia  Complications of thrombocytopenia reviewed risk for bleeding reviewed  Improving  In the event of bleeding bruising fall injury or edema go to ER or call 911  Recheck CBC next    Please call clinic with any changes in health condition or questions      JAKI Spaulding CNP  [facility]     Chart documentation with Dragon Voice recognition Software. Although reviewed after completion, some words and grammatical errors may remain.          Again, thank you for allowing me to participate in the care of your patient.        Sincerely,        JAKI Spaulding CNP

## 2024-11-04 NOTE — PROGRESS NOTES
Oncology/Hematology Visit Note  Nov 4, 2024    Reason for Visit: follow up of high-grade neuroendocrine carcinoma  Large cell type involving left parotid gland and left cervical lymph node  Status post concurrent chemoradiation carboplatin etoposide for 4 cycles 08/14/2024 and 10/18/2024 (carboplatin chosen because of CKD, hearing loss and his age).   Status post radiation to left parotid between 09/09/2024 and 10/21/2024. 6000 cGy in 30 fractions.     Interval History:  Patient reports overall feeling well.  Denies bleeding bruising denies fever chills sweats cough shortness of breath chest pain nausea vomiting diarrhea abdominal pain or bleed  Denies dysphagia reports taking small frequent meals.    Review of Systems:  14 point ROS of systems including Constitutional, Eyes, Respiratory, Cardiovascular, Gastroenterology, Genitourinary, Integumentary, Muscularskeletal, Psychiatric were all negative except for pertinent positives noted in my HPI.      Physical Examination:  General: The patient is a pleasant male in no acute distress.  /63   Pulse 62   Resp 16   Wt 80.3 kg (177 lb)   SpO2 97%   BMI 24.69 kg/m    HEENT: EOMI, PERRL. Sclerae are anicteric. Oral mucosa is pink and moist with no lesions or thrush.   Lymph: Neck is supple with no lymphadenopathy in the cervical or supraclavicular areas.   Heart: Regular rate and rhythm.   Lungs: Clear to auscultation bilaterally.   GI: Bowel sounds present, soft, nontender with no palpable hepatosplenomegaly or masses.   Extremities: No lower extremity edema noted bilaterally.   Skin: No rashes, petechiae, or bruising noted on exposed skin.    Laboratory Data:    CBC with differential reviewed    Assessment and Plan:    high-grade neuroendocrine carcinoma  Large cell type involving left parotid gland and left cervical lymph nodes   Patient follows with Dr. Butler  Status post concurrent chemoradiation  S/p carboplatin etoposide for 4 cycles 08/14/2024 and  10/18/2024 (carboplatin chosen because of CKD, hearing loss and his age).   Status post radiation to left parotid between 09/09/2024 and 10/21/2024. 6000 cGy in 30 fractions.   Recent hospitalization with weakness and dehydration  -Patient reports he is feeling significant improvement since the discharge  CBC reviewed-  Please schedule CBC with differential and follow-up with me next  Schedule for PET scan first week of January and follow-up with Dr. Butler  Recheck CBC with differential and CMP next week and follow-up with me     A-fib  Eliquis on hold due to thrombocytopenia  Platelets are above 50 okay to restart Eliquis.  Patient denies bleeding or bruising  In the event of bleeding bruising fall injury go to ER or call 911    Leukopenia  Normal ANC check temperature frequently in the event of fever chills sweats or any signs symptoms of infection go to ER  Recheck CBC with differential next week and follow-up with    Anemia  Patient denies bleeding  Recheck CBC next    Thrombocytopenia  Complications of thrombocytopenia reviewed risk for bleeding reviewed  Improving  In the event of bleeding bruising fall injury or edema go to ER or call 911  Recheck CBC next    Please call clinic with any changes in health condition or questions      JAKI Spaulding [facility]     Chart documentation with Dragon Voice recognition Software. Although reviewed after completion, some words and grammatical errors may remain.

## 2024-11-04 NOTE — PROGRESS NOTES
Nursing Note:  Pascual Perea presents today for port labs.    Patient seen by provider today: Yes: GABRIELA Otero   present during visit today: Not Applicable.    Note: N/A.    Intravenous Access:  Labs drawn without difficulty.  Implanted Port.    Discharge Plan:   Patient was sent to [facility] for clinic appointment.    Earnest Amato RN

## 2024-11-04 NOTE — PROGRESS NOTES
"Oncology Rooming Note    November 4, 2024 9:29 AM   Pascual Perea is a 84 year old male who presents for:    Chief Complaint   Patient presents with    Oncology Clinic Visit     Initial Vitals: There were no vitals taken for this visit. Estimated body mass index is 24.13 kg/m  as calculated from the following:    Height as of 10/16/24: 1.803 m (5' 11\").    Weight as of 10/30/24: 78.5 kg (173 lb). There is no height or weight on file to calculate BSA.  Data Unavailable Comment: Data Unavailable   No LMP for male patient.  Allergies reviewed: Yes  Medications reviewed: Yes    Medications: Medication refills not needed today.  Pharmacy name entered into [facility]: [facility] - [place] [place] - [address]    Frailty Screening:   Is the patient here for a new oncology consult visit in cancer care? 2. No      Clinical concerns:   np was notified.      Lara Banerjee CMA            "

## 2024-11-04 NOTE — PLAN OF CARE
"Physical Therapy Discharge Summary    Reason for therapy discharge:    Discharged to home.    Progress towards therapy goal(s). See goals on Care Plan in [facility] electronic health record for goal details.  Goals not met.  Barriers to achieving goals:   discharge from facility.    Therapy recommendation(s):    Continued therapy is recommended.  Rationale/Recommendations:  Per last treating therapist note: \"Pt slightly below baseline for functional mobility. Currently CGA with SEC, SBA with FWW for hallway gait. Anticipate with further IP PT and mobilization with nursing staff, pt can return home with HHPT to address continued mobility deficits. Currently rec use of FWW for increased stability, pt will need one issued at discharge.\"      "

## 2024-11-04 NOTE — PROGRESS NOTES
Clinic Care Coordination Contact  Transitions of Care Outreach  Chief Complaint   Patient presents with    Clinic Care Coordination - [facility]       Most Recent Admission Date: 10/30/2024   Most Recent Admission Diagnosis:      Most Recent Discharge Date: 11/1/2024   Most Recent Discharge Diagnosis: Open wound - T14.8XXA  Generalized muscle weakness - M62.81  Dehydration - E86.0  Atrial fibrillation, unspecified type (H) - I48.91  Hypothyroidism, unspecified type - E03.9  Essential hypertension, benign - I10  Type 2 diabetes mellitus without complication, with long-term current use of insulin (H) - E11.9, Z79.4  Stage 3b chronic kidney disease (H) - N18.32  Generalized weakness - R53.1  JEY (acute kidney injury) (H) - N17.9  Vitamin D deficiency - E55.9  Gastroesophageal reflux disease without esophagitis - K21.9  BRANDON (obstructive sleep apnea) - G47.33  Chemotherapy-induced neutropenia (H) - D70.1, T45.1X5A  Hyperlipidemia LDL goal <70 - E78.5  Acute midline low back pain without sciatica - M54.50     Transitions of Care Assessment    Discharge Assessment  How are you doing now that you are home?: Going fine.  How are your symptoms? (Red Flag symptoms escalate to triage hotline per guidelines): Improved  Do you know how to contact your clinic care team if you have future questions or changes to your health status? : Yes  Does the patient have their discharge instructions? : Yes  Does the patient have questions regarding their discharge instructions? : No  Were you started on any new medications or were there changes to any of your previous medications? : Yes  Does the patient have all of their medications?: Yes  Do you have questions regarding any of your medications? : No         Post-op (Clinicians Only)  Did the patient have surgery or a procedure: No  Fever: No  Chills: No  Eating & Drinking: eating and drinking without complaints/concerns  PO Intake: regular diet  Additional Symptoms:  (denies)  Bowel  Function: normal  Urinary Status: voiding without complaint/concerns    Care Management   None    Follow up Plan     Patient declined Care Coordination services at this time.   Patient is aware of how to initiate Care Coordination services with the clinic in the future.     Patient declined to review medication list.     Discharge Follow-Up  Discharge follow up appointment scheduled in alignment with recommended follow up timeframe or Transitions of Risk Category? (Low = within 30 days; Moderate= within 14 days; High= within 7 days): Yes  Discharge Follow Up Appointment Date: 11/04/24  Discharge Follow Up Appointment Scheduled with?: Specialty Care Provider    No future appointments.    Outpatient Plan as outlined on AVS reviewed with patient.    For any urgent concerns, please contact our 24 hour nurse triage line: [phone] ([phone])       Ananya Baer RN

## 2024-11-13 ENCOUNTER — LAB (OUTPATIENT)
Dept: LAB | Facility: CLINIC | Age: 84
End: 2024-11-13
Payer: COMMERCIAL

## 2024-11-13 ENCOUNTER — ONCOLOGY VISIT (OUTPATIENT)
Dept: ONCOLOGY | Facility: CLINIC | Age: 84
End: 2024-11-13
Attending: INTERNAL MEDICINE
Payer: COMMERCIAL

## 2024-11-13 VITALS
BODY MASS INDEX: 25.94 KG/M2 | RESPIRATION RATE: 16 BRPM | HEART RATE: 70 BPM | TEMPERATURE: 98.3 F | WEIGHT: 186 LBS | OXYGEN SATURATION: 98 % | DIASTOLIC BLOOD PRESSURE: 71 MMHG | SYSTOLIC BLOOD PRESSURE: 157 MMHG

## 2024-11-13 DIAGNOSIS — C7A.1 HIGH GRADE NEUROENDOCRINE CARCINOMA (H): ICD-10-CM

## 2024-11-13 DIAGNOSIS — C7A.1 HIGH GRADE NEUROENDOCRINE CARCINOMA (H): Primary | ICD-10-CM

## 2024-11-13 LAB
ALBUMIN SERPL BCG-MCNC: 4 G/DL (ref 3.5–5.2)
ALP SERPL-CCNC: 63 U/L (ref 40–150)
ALT SERPL W P-5'-P-CCNC: 13 U/L (ref 0–70)
ANION GAP SERPL CALCULATED.3IONS-SCNC: 11 MMOL/L (ref 7–15)
AST SERPL W P-5'-P-CCNC: 18 U/L (ref 0–45)
BASOPHILS # BLD AUTO: 0 10E3/UL (ref 0–0.2)
BASOPHILS NFR BLD AUTO: 0 %
BILIRUB SERPL-MCNC: 0.7 MG/DL
BUN SERPL-MCNC: 12.3 MG/DL (ref 8–23)
CALCIUM SERPL-MCNC: 10.3 MG/DL (ref 8.8–10.4)
CHLORIDE SERPL-SCNC: 109 MMOL/L (ref 98–107)
CREAT SERPL-MCNC: 1.1 MG/DL (ref 0.67–1.17)
EGFRCR SERPLBLD CKD-EPI 2021: 67 ML/MIN/1.73M2
EOSINOPHIL # BLD AUTO: 0 10E3/UL (ref 0–0.7)
EOSINOPHIL NFR BLD AUTO: 0 %
ERYTHROCYTE [DISTWIDTH] IN BLOOD BY AUTOMATED COUNT: 17.8 % (ref 10–15)
GLUCOSE SERPL-MCNC: 94 MG/DL (ref 70–99)
HCO3 SERPL-SCNC: 24 MMOL/L (ref 22–29)
HCT VFR BLD AUTO: 29.6 % (ref 40–53)
HGB BLD-MCNC: 9.2 G/DL (ref 13.3–17.7)
IMM GRANULOCYTES # BLD: 0 10E3/UL
IMM GRANULOCYTES NFR BLD: 0 %
LYMPHOCYTES # BLD AUTO: 0.3 10E3/UL (ref 0.8–5.3)
LYMPHOCYTES NFR BLD AUTO: 11 %
MCH RBC QN AUTO: 30.2 PG (ref 26.5–33)
MCHC RBC AUTO-ENTMCNC: 31.1 G/DL (ref 31.5–36.5)
MCV RBC AUTO: 97 FL (ref 78–100)
MONOCYTES # BLD AUTO: 0.4 10E3/UL (ref 0–1.3)
MONOCYTES NFR BLD AUTO: 14 %
NEUTROPHILS # BLD AUTO: 2 10E3/UL (ref 1.6–8.3)
NEUTROPHILS NFR BLD AUTO: 74 %
PLATELET # BLD AUTO: 103 10E3/UL (ref 150–450)
POTASSIUM SERPL-SCNC: 4.6 MMOL/L (ref 3.4–5.3)
PROT SERPL-MCNC: 6.3 G/DL (ref 6.4–8.3)
RBC # BLD AUTO: 3.05 10E6/UL (ref 4.4–5.9)
SODIUM SERPL-SCNC: 144 MMOL/L (ref 135–145)
WBC # BLD AUTO: 2.7 10E3/UL (ref 4–11)

## 2024-11-13 PROCEDURE — 99214 OFFICE O/P EST MOD 30 MIN: CPT | Performed by: NURSE PRACTITIONER

## 2024-11-13 PROCEDURE — 85025 COMPLETE CBC W/AUTO DIFF WBC: CPT

## 2024-11-13 PROCEDURE — 36415 COLL VENOUS BLD VENIPUNCTURE: CPT

## 2024-11-13 PROCEDURE — 80053 COMPREHEN METABOLIC PANEL: CPT

## 2024-11-13 PROCEDURE — G0463 HOSPITAL OUTPT CLINIC VISIT: HCPCS | Performed by: NURSE PRACTITIONER

## 2024-11-13 ASSESSMENT — PAIN SCALES - GENERAL: PAINLEVEL_OUTOF10: NO PAIN (0)

## 2024-11-13 NOTE — LETTER
"11/13/2024      Pascual Perea  [address]  [facility] [address]      Dear Colleague,    Thank you for referring your patient, Pascual Perea, to the [facility]. Please see a copy of my visit note below.    Oncology Rooming Note    November 13, 2024 3:16 PM   Pascual Perea is a 84 year old male who presents for:    Chief Complaint   Patient presents with     Oncology Clinic Visit     Initial Vitals: There were no vitals taken for this visit. Estimated body mass index is 24.69 kg/m  as calculated from the following:    Height as of 10/16/24: 1.803 m (5' 11\").    Weight as of 11/4/24: 80.3 kg (177 lb). There is no height or weight on file to calculate BSA.  Data Unavailable Comment: Data Unavailable   No LMP for male patient.  Allergies reviewed: Yes  Medications reviewed: Yes    Medications: Medication refills not needed today.  Pharmacy name entered into [employer]: [facility] [place] - [address]    Frailty Screening:   Is the patient here for a new oncology consult visit in cancer care? 2. No      Clinical concerns:   np was notified.      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   Status post radiation to left parotid between 09/09/2024 and 10/21/2024. 6000 cGy in 30 fractions.     Interval History:  Pt reports improvement. Denies fever chills or sweats.Denies nausea vomiting or diarrhea. Denies cough   Denies bleeding.   Report improvement in appetite and energy         Review of Systems:  14 point ROS of systems including Constitutional, Eyes, Respiratory, Cardiovascular, Gastroenterology, Genitourinary, " Integumentary, Muscularskeletal, Psychiatric were all negative except for pertinent positives noted in my HPI.      Physical Examination:  General: The patient is a pleasant male in no acute distress.  BP (!) 157/71   Pulse 70   Temp 98.3  F (36.8  C) (Oral)   Resp 16   Wt 84.4 kg (186 lb)   SpO2 98%   BMI 25.94 kg/m    HEENT: EOMI, PERRL. Sclerae are anicteric. Oral mucosa is pink and moist with no lesions or thrush.   Lymph: Neck is supple with no lymphadenopathy in the cervical or supraclavicular areas.   Heart: Regular rate and rhythm.   Lungs: Clear to auscultation bilaterally.   GI: Bowel sounds present, soft, nontender with no palpable hepatosplenomegaly or masses.   Extremities: No lower extremity edema noted bilaterally.   Skin: No rashes, petechiae, or bruising noted on exposed skin.    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 Butler  Status post concurrent chemoradiation  S/p carboplatin etoposide for 4 cycles 08/14/2024 and 10/18/2024 (carboplatin chosen because of CKD, hearing loss and his age).   Status post radiation to left parotid between 09/09/2024 and 10/21/2024. 6000 cGy in 30 fractions.   Recent hospitalization with weakness and dehydration  -Patient reports he is feeling significant improvement since the discharge  CBC CMP reviewed-  schedule CBC with differential and follow-up me in December  Schedule for PET scan first week of January and follow-up with Dr. Butler      A-fib  Plt 103  Ok to continue with Eliquis     Leukopenia  Normal ANC check temperature frequently in the event of fever chills sweats or any signs symptoms of infection go to ER  Recheck CBC with differential next week and follow-up with    Anemia  Improvement   Patient denies bleeding  Recheck CBC next    Thrombocytopenia  Complications of  thrombocytopenia reviewed risk for bleeding reviewed  Improving  In the event of bleeding bruising fall injury or edema go to ER or call 911  Recheck CBC next    Please call clinic with any changes in health condition or questions      JAKI Spaulding CNP  [facility]     Chart documentation with Dragon Voice recognition Software. Although reviewed after completion, some words and grammatical errors may remain.          Again, thank you for allowing me to participate in the care of your patient.        Sincerely,        JAKI Spaulding CNP

## 2024-11-14 NOTE — PROGRESS NOTES
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   Status post radiation to left parotid between 09/09/2024 and 10/21/2024. 6000 cGy in 30 fractions.     Interval History:  Pt reports improvement. Denies fever chills or sweats.Denies nausea vomiting or diarrhea. Denies cough   Denies bleeding.   Report improvement in appetite and energy         Review of Systems:  14 point ROS of systems including Constitutional, Eyes, Respiratory, Cardiovascular, Gastroenterology, Genitourinary, Integumentary, Muscularskeletal, Psychiatric were all negative except for pertinent positives noted in my HPI.      Physical Examination:  General: The patient is a pleasant male in no acute distress.  BP (!) 157/71   Pulse 70   Temp 98.3  F (36.8  C) (Oral)   Resp 16   Wt 84.4 kg (186 lb)   SpO2 98%   BMI 25.94 kg/m    HEENT: EOMI, PERRL. Sclerae are anicteric. Oral mucosa is pink and moist with no lesions or thrush.   Lymph: Neck is supple with no lymphadenopathy in the cervical or supraclavicular areas.   Heart: Regular rate and rhythm.   Lungs: Clear to auscultation bilaterally.   GI: Bowel sounds present, soft, nontender with no palpable hepatosplenomegaly or masses.   Extremities: No lower extremity edema noted bilaterally.   Skin: No rashes, petechiae, or bruising noted on exposed skin.    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 Butler  Status post concurrent chemoradiation  S/p carboplatin etoposide for 4 cycles 08/14/2024 and 10/18/2024  (carboplatin chosen because of CKD, hearing loss and his age).   Status post radiation to left parotid between 09/09/2024 and 10/21/2024. 6000 cGy in 30 fractions.   Recent hospitalization with weakness and dehydration  -Patient reports he is feeling significant improvement since the discharge  CBC CMP reviewed-  schedule CBC with differential and follow-up me in December  Schedule for PET scan first week of January and follow-up with Dr. Luke HINOJOSA-fib  Plt 103  Ok to continue with Eliquis     Leukopenia  Normal ANC check temperature frequently in the event of fever chills sweats or any signs symptoms of infection go to ER  Recheck CBC with differential next week and follow-up with    Anemia  Improvement   Patient denies bleeding  Recheck CBC next    Thrombocytopenia  Complications of thrombocytopenia reviewed risk for bleeding reviewed  Improving  In the event of bleeding bruising fall injury or edema go to ER or call 911  Recheck CBC next    Please call clinic with any changes in health condition or questions      JAKI Spaulding [facility] [place]     Chart documentation with Dragon Voice recognition Software. Although reviewed after completion, some words and grammatical errors may remain.

## 2024-11-21 DIAGNOSIS — C07 MALIGNANT NEOPLASM OF PAROTID GLAND (H): ICD-10-CM

## 2024-11-21 DIAGNOSIS — C7A.1 HIGH GRADE NEUROENDOCRINE CARCINOMA (H): Primary | ICD-10-CM

## 2024-12-09 ENCOUNTER — INFUSION THERAPY VISIT (OUTPATIENT)
Dept: INFUSION THERAPY | Facility: CLINIC | Age: 84
End: 2024-12-09
Attending: NURSE PRACTITIONER
Payer: COMMERCIAL

## 2024-12-09 ENCOUNTER — ONCOLOGY VISIT (OUTPATIENT)
Dept: ONCOLOGY | Facility: CLINIC | Age: 84
End: 2024-12-09
Attending: NURSE PRACTITIONER
Payer: COMMERCIAL

## 2024-12-09 VITALS
HEIGHT: 71 IN | SYSTOLIC BLOOD PRESSURE: 147 MMHG | DIASTOLIC BLOOD PRESSURE: 70 MMHG | OXYGEN SATURATION: 97 % | TEMPERATURE: 97.5 F | BODY MASS INDEX: 27.35 KG/M2 | WEIGHT: 195.4 LBS | RESPIRATION RATE: 16 BRPM | HEART RATE: 61 BPM

## 2024-12-09 DIAGNOSIS — D51.3 OTHER DIETARY VITAMIN B12 DEFICIENCY ANEMIA: ICD-10-CM

## 2024-12-09 DIAGNOSIS — C7A.1 HIGH GRADE NEUROENDOCRINE CARCINOMA (H): Primary | ICD-10-CM

## 2024-12-09 LAB
ALBUMIN SERPL BCG-MCNC: 4 G/DL (ref 3.5–5.2)
ALP SERPL-CCNC: 57 U/L (ref 40–150)
ALT SERPL W P-5'-P-CCNC: 11 U/L (ref 0–70)
ANION GAP SERPL CALCULATED.3IONS-SCNC: 10 MMOL/L (ref 7–15)
AST SERPL W P-5'-P-CCNC: 20 U/L (ref 0–45)
BASOPHILS # BLD AUTO: 0 10E3/UL (ref 0–0.2)
BASOPHILS NFR BLD AUTO: 1 %
BILIRUB SERPL-MCNC: 0.7 MG/DL
BUN SERPL-MCNC: 12.2 MG/DL (ref 8–23)
CALCIUM SERPL-MCNC: 9.5 MG/DL (ref 8.8–10.4)
CHLORIDE SERPL-SCNC: 107 MMOL/L (ref 98–107)
CREAT SERPL-MCNC: 1.12 MG/DL (ref 0.67–1.17)
EGFRCR SERPLBLD CKD-EPI 2021: 65 ML/MIN/1.73M2
EOSINOPHIL # BLD AUTO: 0.1 10E3/UL (ref 0–0.7)
EOSINOPHIL NFR BLD AUTO: 4 %
ERYTHROCYTE [DISTWIDTH] IN BLOOD BY AUTOMATED COUNT: 16 % (ref 10–15)
FERRITIN SERPL-MCNC: 370 NG/ML (ref 31–409)
GLUCOSE SERPL-MCNC: 168 MG/DL (ref 70–99)
HCO3 SERPL-SCNC: 23 MMOL/L (ref 22–29)
HCT VFR BLD AUTO: 27.6 % (ref 40–53)
HGB BLD-MCNC: 8.8 G/DL (ref 13.3–17.7)
IMM GRANULOCYTES # BLD: 0 10E3/UL
IMM GRANULOCYTES NFR BLD: 0 %
IRON BINDING CAPACITY (ROCHE): 271 UG/DL (ref 240–430)
IRON SATN MFR SERPL: 19 % (ref 15–46)
IRON SERPL-MCNC: 51 UG/DL (ref 61–157)
LYMPHOCYTES # BLD AUTO: 0.4 10E3/UL (ref 0.8–5.3)
LYMPHOCYTES NFR BLD AUTO: 12 %
MCH RBC QN AUTO: 30.7 PG (ref 26.5–33)
MCHC RBC AUTO-ENTMCNC: 31.9 G/DL (ref 31.5–36.5)
MCV RBC AUTO: 96 FL (ref 78–100)
MONOCYTES # BLD AUTO: 0.4 10E3/UL (ref 0–1.3)
MONOCYTES NFR BLD AUTO: 11 %
NEUTROPHILS # BLD AUTO: 2.2 10E3/UL (ref 1.6–8.3)
NEUTROPHILS NFR BLD AUTO: 71 %
NRBC # BLD AUTO: 0 10E3/UL
NRBC BLD AUTO-RTO: 0 /100
PLATELET # BLD AUTO: 69 10E3/UL (ref 150–450)
POTASSIUM SERPL-SCNC: 4.1 MMOL/L (ref 3.4–5.3)
PROT SERPL-MCNC: 6.2 G/DL (ref 6.4–8.3)
RBC # BLD AUTO: 2.87 10E6/UL (ref 4.4–5.9)
SODIUM SERPL-SCNC: 140 MMOL/L (ref 135–145)
VIT B12 SERPL-MCNC: 223 PG/ML (ref 232–1245)
WBC # BLD AUTO: 3.2 10E3/UL (ref 4–11)

## 2024-12-09 PROCEDURE — 82040 ASSAY OF SERUM ALBUMIN: CPT | Performed by: NURSE PRACTITIONER

## 2024-12-09 PROCEDURE — 250N000011 HC RX IP 250 OP 636: Performed by: NURSE PRACTITIONER

## 2024-12-09 PROCEDURE — 83540 ASSAY OF IRON: CPT | Performed by: NURSE PRACTITIONER

## 2024-12-09 PROCEDURE — 82728 ASSAY OF FERRITIN: CPT | Performed by: NURSE PRACTITIONER

## 2024-12-09 PROCEDURE — 80053 COMPREHEN METABOLIC PANEL: CPT | Performed by: NURSE PRACTITIONER

## 2024-12-09 PROCEDURE — G0463 HOSPITAL OUTPT CLINIC VISIT: HCPCS | Performed by: NURSE PRACTITIONER

## 2024-12-09 PROCEDURE — 83550 IRON BINDING TEST: CPT | Performed by: NURSE PRACTITIONER

## 2024-12-09 PROCEDURE — 85041 AUTOMATED RBC COUNT: CPT | Performed by: NURSE PRACTITIONER

## 2024-12-09 PROCEDURE — 82607 VITAMIN B-12: CPT | Performed by: NURSE PRACTITIONER

## 2024-12-09 PROCEDURE — 85025 COMPLETE CBC W/AUTO DIFF WBC: CPT | Performed by: NURSE PRACTITIONER

## 2024-12-09 PROCEDURE — 99214 OFFICE O/P EST MOD 30 MIN: CPT | Performed by: NURSE PRACTITIONER

## 2024-12-09 PROCEDURE — 36591 DRAW BLOOD OFF VENOUS DEVICE: CPT

## 2024-12-09 RX ORDER — HEPARIN SODIUM,PORCINE 10 UNIT/ML
5-20 VIAL (ML) INTRAVENOUS DAILY PRN
OUTPATIENT
Start: 2024-12-09

## 2024-12-09 RX ORDER — HEPARIN SODIUM (PORCINE) LOCK FLUSH IV SOLN 100 UNIT/ML 100 UNIT/ML
5 SOLUTION INTRAVENOUS
Status: DISCONTINUED | OUTPATIENT
Start: 2024-12-09 | End: 2024-12-09 | Stop reason: HOSPADM

## 2024-12-09 RX ORDER — HEPARIN SODIUM (PORCINE) LOCK FLUSH IV SOLN 100 UNIT/ML 100 UNIT/ML
5 SOLUTION INTRAVENOUS
OUTPATIENT
Start: 2024-12-09

## 2024-12-09 RX ORDER — HEPARIN SODIUM,PORCINE 10 UNIT/ML
5-20 VIAL (ML) INTRAVENOUS DAILY PRN
Status: CANCELLED | OUTPATIENT
Start: 2024-12-09

## 2024-12-09 RX ADMIN — Medication 5 ML: at 14:16

## 2024-12-09 ASSESSMENT — PAIN SCALES - GENERAL: PAINLEVEL_OUTOF10: NO PAIN (0)

## 2024-12-09 NOTE — LETTER
"12/9/2024      Pascual Perea  [address]  [facility] [address]      Dear Colleague,    Thank you for referring your patient, Pascual Perea, to the [facility]. Please see a copy of my visit note below.    Oncology Rooming Note    December 9, 2024 3:13 PM   Pascual Perea is a 84 year old male who presents for:    Chief Complaint   Patient presents with     Oncology Clinic Visit     Initial Vitals: There were no vitals taken for this visit. Estimated body mass index is 25.94 kg/m  as calculated from the following:    Height as of 10/16/24: 1.803 m (5' 11\").    Weight as of 11/13/24: 84.4 kg (186 lb). There is no height or weight on file to calculate BSA.  Data Unavailable Comment: Data Unavailable   No LMP for male patient.  Allergies reviewed: Yes  Medications reviewed: Yes    Medications: Medication refills not needed today.  Pharmacy name entered into [employer]: [facility] [place] - [address]    Frailty Screening:   Is the patient here for a new oncology consult visit in cancer care? 2. 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   Status post radiation to left parotid between 09/09/2024 and 10/21/2024. 6000 cGy in 30 fractions.     Interval History:  Overall patient reports he is recovering well from chemoradiation.  Reports improvement in energy.  Denies fever chills sweats cough shortness of breath chest pain nausea vomiting diarrhea abdominal pain or bleeding denies bruising.  Denies edema      Review of Systems:  14 point ROS of systems including Constitutional, Eyes, Respiratory, Cardiovascular, " "Gastroenterology, Genitourinary, Integumentary, Muscularskeletal, Psychiatric were all negative except for pertinent positives noted in my HPI.      Physical Examination:  General: The patient is a pleasant male in no acute distress.  BP (!) 147/70   Pulse 61   Temp 97.5  F (36.4  C)   Resp 16   Ht 1.803 m (5' 11\")   Wt 88.6 kg (195 lb 6.4 oz)   SpO2 97%   BMI 27.25 kg/m    HEENT: EOMI, PERRL. Sclerae are anicteric. Oral mucosa is pink and moist with no lesions or thrush.   Lymph: Neck is supple with no lymphadenopathy in the cervical or supraclavicular areas.   Heart: Regular rate and rhythm.   Lungs: Clear to auscultation bilaterally.   GI: Bowel sounds present, soft, nontender with no palpable hepatosplenomegaly or masses.   Extremities: No lower extremity edema noted bilaterally.   Skin: No rashes, petechiae, or bruising noted on exposed skin.    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 Butler  Status post concurrent chemoradiation  S/p carboplatin etoposide for 4 cycles 08/14/2024 and 10/18/2024 (carboplatin chosen because of CKD, hearing loss and his age).   Status post radiation to left parotid between 09/09/2024 and 10/21/2024. 6000 cGy in 30 fractions.   Patient reports he is recovering well from the chemoradiation.  Overall reports feeling well  schedule CBC with differential and follow-up me on 12/23/24  Schedule for PET scan with /CT CAP and neck to evaluate for treatment response first week of January and follow-up with Dr. Butler to reviewed results      A-fib  Plt 103  Ok to continue with Eliquis     Leukopenia  Normal ANC check temperature frequently in the event of fever chills sweats or any signs symptoms of infection go to ER  Recheck CBC with differential next week and follow-up with    Anemia  Improvement "   Patient denies bleeding  Recheck CBC on 12/23     Thrombocytopenia  Complications of thrombocytopenia reviewed risk for bleeding reviewed  In the event of bleeding bruising fall injury or edema go to ER or call 911  Recheck CBC on 12/23    Please call clinic with any changes in health condition or questions      JAKI Spaulding CNP  [facility]     Chart documentation with Dragon Voice recognition Software. Although reviewed after completion, some words and grammatical errors may remain.          Again, thank you for allowing me to participate in the care of your patient.        Sincerely,        JAKI Spaulding CNP

## 2024-12-09 NOTE — PROGRESS NOTES
"Oncology Rooming Note    December 9, 2024 3:13 PM   Pascual Perea is a 84 year old male who presents for:    Chief Complaint   Patient presents with    Oncology Clinic Visit     Initial Vitals: There were no vitals taken for this visit. Estimated body mass index is 25.94 kg/m  as calculated from the following:    Height as of 10/16/24: 1.803 m (5' 11\").    Weight as of 11/13/24: 84.4 kg (186 lb). There is no height or weight on file to calculate BSA.  Data Unavailable Comment: Data Unavailable   No LMP for male patient.  Allergies reviewed: Yes  Medications reviewed: Yes    Medications: Medication refills not needed today.  Pharmacy name entered into [employer]: [facility] [place] - [address]    Frailty Screening:   Is the patient here for a new oncology consult visit in cancer care? 2. No        Tigist Jones MA            "

## 2024-12-09 NOTE — PROGRESS NOTES
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   Status post radiation to left parotid between 09/09/2024 and 10/21/2024. 6000 cGy in 30 fractions.     Interval History:  Overall patient reports he is recovering well from chemoradiation.  Reports improvement in energy.  Denies fever chills sweats cough shortness of breath chest pain nausea vomiting diarrhea abdominal pain or bleeding denies bruising.  Denies edema      Review of Systems:  14 point ROS of systems including Constitutional, Eyes, Respiratory, Cardiovascular, Gastroenterology, Genitourinary, Integumentary, Muscularskeletal, Psychiatric were all negative except for pertinent positives noted in my HPI.      Physical Examination:  General: The patient is a pleasant male in no acute distress.  BP (!) 147/70   Pulse 61   Temp 97.5  F (36.4  C)   Resp 16   Ht 1.803 m (5' 11\")   Wt 88.6 kg (195 lb 6.4 oz)   SpO2 97%   BMI 27.25 kg/m    HEENT: EOMI, PERRL. Sclerae are anicteric. Oral mucosa is pink and moist with no lesions or thrush.   Lymph: Neck is supple with no lymphadenopathy in the cervical or supraclavicular areas.   Heart: Regular rate and rhythm.   Lungs: Clear to auscultation bilaterally.   GI: Bowel sounds present, soft, nontender with no palpable hepatosplenomegaly or masses.   Extremities: No lower extremity edema noted bilaterally.   Skin: No rashes, petechiae, or bruising noted on exposed skin.    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 Butler  Status post " concurrent chemoradiation  S/p carboplatin etoposide for 4 cycles 08/14/2024 and 10/18/2024 (carboplatin chosen because of CKD, hearing loss and his age).   Status post radiation to left parotid between 09/09/2024 and 10/21/2024. 6000 cGy in 30 fractions.   Patient reports he is recovering well from the chemoradiation.  Overall reports feeling well  schedule CBC with differential and follow-up me on 12/23/24  Schedule for PET scan with /CT CAP and neck to evaluate for treatment response first week of January and follow-up with Dr. Butler to reviewed results      A-fib  Ok to continue with Eliquis since platelet count is above 50  I informed patient that he is at high risk for bleeding complications of bleeding reviewed  Advised patient intermittent bleeding bruising fall injury or edema headache dizziness to go to ER    Leukopenia  Normal ANC check temperature frequently in the event of fever chills sweats or any signs symptoms of infection go to ER  Recheck CBC with differential next week and follow-up with    Anemia  Improvement   Patient denies bleeding  Recheck CBC on 12/23     Thrombocytopenia  Complications of thrombocytopenia reviewed risk for bleeding reviewed  In the event of bleeding bruising fall injury or edema go to ER or call 911  Recheck CBC on 12/23    Addendum  Vitamin B12 deficiency-start B12 injections weekly for 4 weeks followed by monthly injections  Iron is a little bit low we will start him on gentle iron p.o.    Please call clinic with any changes in health condition or questions      JAKI Spaulding CNP  [facility]     Chart documentation with Dragon Voice recognition Software. Although reviewed after completion, some words and grammatical errors may remain.

## 2024-12-09 NOTE — PROGRESS NOTES
Nursing Note:  Pascual Perea presents today for port labs.    Patient seen by provider today: Yes: Branden Bear CNP   present during visit today: Not Applicable.    Note: N/A.    Intravenous Access:  Labs drawn without difficulty.  Implanted Port.    Discharge Plan:   Patient was sent to [facility] for provider appointment.    Qing Da Silva RN

## 2024-12-10 ENCOUNTER — DOCUMENTATION ONLY (OUTPATIENT)
Dept: LAB | Facility: CLINIC | Age: 84
End: 2024-12-10
Payer: COMMERCIAL

## 2024-12-10 DIAGNOSIS — C7A.1 HIGH GRADE NEUROENDOCRINE CARCINOMA (H): Primary | ICD-10-CM

## 2024-12-10 PROBLEM — D51.3 OTHER DIETARY VITAMIN B12 DEFICIENCY ANEMIA: Status: ACTIVE | Noted: 2024-12-10

## 2024-12-10 RX ORDER — MEPERIDINE HYDROCHLORIDE 25 MG/ML
25 INJECTION INTRAMUSCULAR; INTRAVENOUS; SUBCUTANEOUS
OUTPATIENT
Start: 2024-12-17

## 2024-12-10 RX ORDER — EPINEPHRINE 1 MG/ML
0.3 INJECTION, SOLUTION INTRAMUSCULAR; SUBCUTANEOUS EVERY 5 MIN PRN
OUTPATIENT
Start: 2024-12-17

## 2024-12-10 RX ORDER — CYANOCOBALAMIN 1000 UG/ML
1000 INJECTION, SOLUTION INTRAMUSCULAR; SUBCUTANEOUS ONCE
Start: 2024-12-17 | End: 2024-12-17

## 2024-12-10 RX ORDER — ALBUTEROL SULFATE 0.83 MG/ML
2.5 SOLUTION RESPIRATORY (INHALATION)
OUTPATIENT
Start: 2024-12-17

## 2024-12-10 RX ORDER — ALBUTEROL SULFATE 90 UG/1
1-2 INHALANT RESPIRATORY (INHALATION)
Start: 2024-12-17

## 2024-12-10 RX ORDER — METHYLPREDNISOLONE SODIUM SUCCINATE 40 MG/ML
40 INJECTION INTRAMUSCULAR; INTRAVENOUS
Start: 2024-12-17

## 2024-12-10 RX ORDER — DIPHENHYDRAMINE HYDROCHLORIDE 50 MG/ML
25 INJECTION INTRAMUSCULAR; INTRAVENOUS
Start: 2024-12-17

## 2024-12-10 RX ORDER — DIPHENHYDRAMINE HYDROCHLORIDE 50 MG/ML
50 INJECTION INTRAMUSCULAR; INTRAVENOUS
Start: 2024-12-17

## 2024-12-10 NOTE — PROGRESS NOTES
Patient has an upcomming lab visit without requested orders. Please place orders as needed.    Patient requesting: Labs Per Luke    Appointment date: 12/23/2024

## 2024-12-12 ENCOUNTER — TELEPHONE (OUTPATIENT)
Dept: ONCOLOGY | Facility: CLINIC | Age: 84
End: 2024-12-12
Payer: COMMERCIAL

## 2024-12-12 NOTE — TELEPHONE ENCOUNTER
Writer called and spoke with [facility]. I reviewed labs and the low B12 level.  I informed him that B12 injections weekly X4 have been order and I will have a  .reach out to assist in making the appointments.     Brigitte Powell RN

## 2024-12-12 NOTE — TELEPHONE ENCOUNTER
----- Message from Branden Bear sent at 12/10/2024 12:13 PM CST -----  Regarding: Schedule B12 injections    1. Eleanor RN  -please inform patient that his vitamin B12 is little bit low which can cause anemia  He will need to be scheduled for vitamin B12 injections weekly for 4 weeks followed by monthly after that Also advised patient to take gentle iron over-the-counter      2 . Eleanor schedule-r please schedule for the B12 injections weekly for 4 weeks followed by monthly injections    Thank you  Branden

## 2024-12-16 ENCOUNTER — ALLIED HEALTH/NURSE VISIT (OUTPATIENT)
Dept: INFUSION THERAPY | Facility: CLINIC | Age: 84
End: 2024-12-16
Attending: NURSE PRACTITIONER
Payer: COMMERCIAL

## 2024-12-16 VITALS
DIASTOLIC BLOOD PRESSURE: 73 MMHG | OXYGEN SATURATION: 98 % | TEMPERATURE: 97.6 F | RESPIRATION RATE: 16 BRPM | SYSTOLIC BLOOD PRESSURE: 123 MMHG | HEART RATE: 60 BPM

## 2024-12-16 DIAGNOSIS — D51.3 OTHER DIETARY VITAMIN B12 DEFICIENCY ANEMIA: Primary | ICD-10-CM

## 2024-12-16 DIAGNOSIS — C7A.1 HIGH GRADE NEUROENDOCRINE CARCINOMA (H): ICD-10-CM

## 2024-12-16 PROCEDURE — 250N000011 HC RX IP 250 OP 636: Performed by: NURSE PRACTITIONER

## 2024-12-16 PROCEDURE — 96372 THER/PROPH/DIAG INJ SC/IM: CPT | Performed by: NURSE PRACTITIONER

## 2024-12-16 RX ORDER — CYANOCOBALAMIN 1000 UG/ML
1000 INJECTION, SOLUTION INTRAMUSCULAR; SUBCUTANEOUS ONCE
Start: 2024-12-23 | End: 2024-12-23

## 2024-12-16 RX ORDER — MEPERIDINE HYDROCHLORIDE 25 MG/ML
25 INJECTION INTRAMUSCULAR; INTRAVENOUS; SUBCUTANEOUS
OUTPATIENT
Start: 2024-12-23

## 2024-12-16 RX ORDER — CYANOCOBALAMIN 1000 UG/ML
1000 INJECTION, SOLUTION INTRAMUSCULAR; SUBCUTANEOUS ONCE
Status: COMPLETED | OUTPATIENT
Start: 2024-12-16 | End: 2024-12-16

## 2024-12-16 RX ORDER — ALBUTEROL SULFATE 90 UG/1
1-2 INHALANT RESPIRATORY (INHALATION)
Start: 2024-12-23

## 2024-12-16 RX ORDER — METHYLPREDNISOLONE SODIUM SUCCINATE 40 MG/ML
40 INJECTION INTRAMUSCULAR; INTRAVENOUS
Start: 2024-12-23

## 2024-12-16 RX ORDER — EPINEPHRINE 1 MG/ML
0.3 INJECTION, SOLUTION INTRAMUSCULAR; SUBCUTANEOUS EVERY 5 MIN PRN
OUTPATIENT
Start: 2024-12-23

## 2024-12-16 RX ORDER — ALBUTEROL SULFATE 0.83 MG/ML
2.5 SOLUTION RESPIRATORY (INHALATION)
OUTPATIENT
Start: 2024-12-23

## 2024-12-16 RX ORDER — DIPHENHYDRAMINE HYDROCHLORIDE 50 MG/ML
50 INJECTION INTRAMUSCULAR; INTRAVENOUS
Start: 2024-12-23

## 2024-12-16 RX ORDER — DIPHENHYDRAMINE HYDROCHLORIDE 50 MG/ML
25 INJECTION INTRAMUSCULAR; INTRAVENOUS
Start: 2024-12-23

## 2024-12-16 RX ADMIN — CYANOCOBALAMIN 1000 MCG: 1000 INJECTION, SOLUTION INTRAMUSCULAR at 13:46

## 2024-12-16 NOTE — PROGRESS NOTES
Infusion Nursing Note:  Pascual ALEXANDRA Eliazar presents today for B12 Injection.    Patient seen by provider today: No   present during visit today: Not Applicable.    Note: N/A.      Intravenous Access:  No Intravenous access/labs at this visit.    Treatment Conditions:  Not Applicable.      Post Infusion Assessment:  Patient tolerated injection without incident.  Site patent and intact, free from redness, edema or discomfort.  No evidence of extravasations.       Discharge Plan:   Patient declined prescription refills.  Discharge instructions reviewed with: Patient.  Patient and/or family verbalized understanding of discharge instructions and all questions answered.  AVS to patient via [employer].    Patient discharged in stable condition accompanied by: self.  Departure Mode: Ambulatory.      Alvaro Lugo RN

## 2024-12-23 ENCOUNTER — HOSPITAL ENCOUNTER (OUTPATIENT)
Dept: PET IMAGING | Facility: CLINIC | Age: 84
Discharge: HOME | End: 2024-12-23
Attending: NURSE PRACTITIONER
Payer: COMMERCIAL

## 2024-12-23 DIAGNOSIS — C07 MALIGNANT NEOPLASM OF PAROTID GLAND (H): ICD-10-CM

## 2024-12-23 DIAGNOSIS — C7A.1 HIGH GRADE NEUROENDOCRINE CARCINOMA (H): ICD-10-CM

## 2024-12-23 DIAGNOSIS — G47.33 OSA (OBSTRUCTIVE SLEEP APNEA): Primary | ICD-10-CM

## 2024-12-23 PROCEDURE — 78813 PET IMAGE FULL BODY: CPT | Mod: 26 | Performed by: RADIOLOGY

## 2024-12-23 PROCEDURE — 74177 CT ABD & PELVIS W/CONTRAST: CPT

## 2024-12-23 PROCEDURE — 71260 CT THORAX DX C+: CPT | Mod: 26 | Performed by: RADIOLOGY

## 2024-12-23 PROCEDURE — 70491 CT SOFT TISSUE NECK W/DYE: CPT | Mod: 26 | Performed by: RADIOLOGY

## 2024-12-23 PROCEDURE — A9552 F18 FDG: HCPCS | Performed by: NURSE PRACTITIONER

## 2024-12-23 PROCEDURE — 71260 CT THORAX DX C+: CPT

## 2024-12-23 PROCEDURE — 343N000001 HC RX 343 MED OP 636: Performed by: NURSE PRACTITIONER

## 2024-12-23 PROCEDURE — 78816 PET IMAGE W/CT FULL BODY: CPT | Mod: PS

## 2024-12-23 PROCEDURE — 250N000011 HC RX IP 250 OP 636: Performed by: NURSE PRACTITIONER

## 2024-12-23 PROCEDURE — 74177 CT ABD & PELVIS W/CONTRAST: CPT | Mod: 26 | Performed by: RADIOLOGY

## 2024-12-23 PROCEDURE — 70491 CT SOFT TISSUE NECK W/DYE: CPT

## 2024-12-23 RX ORDER — FLUDEOXYGLUCOSE F 18 200 MCI/ML
10-18 INJECTION, SOLUTION INTRAVENOUS ONCE
Status: COMPLETED | OUTPATIENT
Start: 2024-12-23 | End: 2024-12-23

## 2024-12-23 RX ORDER — IOPAMIDOL 755 MG/ML
10-135 INJECTION, SOLUTION INTRAVASCULAR ONCE
Status: COMPLETED | OUTPATIENT
Start: 2024-12-23 | End: 2024-12-23

## 2024-12-23 RX ADMIN — IOPAMIDOL 119 ML: 755 INJECTION, SOLUTION INTRAVENOUS at 14:08

## 2024-12-23 RX ADMIN — FLUDEOXYGLUCOSE F 18 13.08 MILLICURIE: 200 INJECTION, SOLUTION INTRAVENOUS at 14:07

## 2024-12-24 ENCOUNTER — DOCUMENTATION ONLY (OUTPATIENT)
Dept: ONCOLOGY | Facility: CLINIC | Age: 84
End: 2024-12-24

## 2024-12-24 ENCOUNTER — HOSPITAL ENCOUNTER (EMERGENCY)
Facility: CLINIC | Age: 84
Discharge: HOME | End: 2024-12-24
Attending: EMERGENCY MEDICINE | Admitting: EMERGENCY MEDICINE
Payer: COMMERCIAL

## 2024-12-24 ENCOUNTER — INFUSION THERAPY VISIT (OUTPATIENT)
Dept: INFUSION THERAPY | Facility: CLINIC | Age: 84
End: 2024-12-24
Attending: INTERNAL MEDICINE
Payer: COMMERCIAL

## 2024-12-24 VITALS
DIASTOLIC BLOOD PRESSURE: 53 MMHG | OXYGEN SATURATION: 99 % | HEART RATE: 56 BPM | BODY MASS INDEX: 26.47 KG/M2 | TEMPERATURE: 98.7 F | RESPIRATION RATE: 16 BRPM | WEIGHT: 189.82 LBS | SYSTOLIC BLOOD PRESSURE: 134 MMHG

## 2024-12-24 VITALS
OXYGEN SATURATION: 97 % | SYSTOLIC BLOOD PRESSURE: 143 MMHG | RESPIRATION RATE: 18 BRPM | TEMPERATURE: 98 F | HEART RATE: 58 BPM | DIASTOLIC BLOOD PRESSURE: 70 MMHG

## 2024-12-24 DIAGNOSIS — D51.3 OTHER DIETARY VITAMIN B12 DEFICIENCY ANEMIA: Primary | ICD-10-CM

## 2024-12-24 DIAGNOSIS — C7A.1 HIGH GRADE NEUROENDOCRINE CARCINOMA (H): ICD-10-CM

## 2024-12-24 DIAGNOSIS — H11.32 SUBCONJUNCTIVAL HEMORRHAGE OF LEFT EYE: ICD-10-CM

## 2024-12-24 LAB
BASOPHILS # BLD AUTO: 0 10E3/UL (ref 0–0.2)
BASOPHILS NFR BLD AUTO: 1 %
EOSINOPHIL # BLD AUTO: 0.1 10E3/UL (ref 0–0.7)
EOSINOPHIL NFR BLD AUTO: 5 %
ERYTHROCYTE [DISTWIDTH] IN BLOOD BY AUTOMATED COUNT: 15.5 % (ref 10–15)
HCT VFR BLD AUTO: 28.2 % (ref 40–53)
HGB BLD-MCNC: 9.2 G/DL (ref 13.3–17.7)
IMM GRANULOCYTES # BLD: 0 10E3/UL
IMM GRANULOCYTES NFR BLD: 0 %
LYMPHOCYTES # BLD AUTO: 0.3 10E3/UL (ref 0.8–5.3)
LYMPHOCYTES NFR BLD AUTO: 12 %
MCH RBC QN AUTO: 31.2 PG (ref 26.5–33)
MCHC RBC AUTO-ENTMCNC: 32.6 G/DL (ref 31.5–36.5)
MCV RBC AUTO: 96 FL (ref 78–100)
MONOCYTES # BLD AUTO: 0.3 10E3/UL (ref 0–1.3)
MONOCYTES NFR BLD AUTO: 11 %
NEUTROPHILS # BLD AUTO: 1.9 10E3/UL (ref 1.6–8.3)
NEUTROPHILS NFR BLD AUTO: 71 %
NRBC # BLD AUTO: 0 10E3/UL
NRBC BLD AUTO-RTO: 0 /100
PLATELET # BLD AUTO: 64 10E3/UL (ref 150–450)
RBC # BLD AUTO: 2.95 10E6/UL (ref 4.4–5.9)
WBC # BLD AUTO: 2.6 10E3/UL (ref 4–11)

## 2024-12-24 PROCEDURE — 99282 EMERGENCY DEPT VISIT SF MDM: CPT

## 2024-12-24 PROCEDURE — 85025 COMPLETE CBC W/AUTO DIFF WBC: CPT | Performed by: NURSE PRACTITIONER

## 2024-12-24 PROCEDURE — 36415 COLL VENOUS BLD VENIPUNCTURE: CPT

## 2024-12-24 PROCEDURE — 250N000011 HC RX IP 250 OP 636: Performed by: NURSE PRACTITIONER

## 2024-12-24 PROCEDURE — 96372 THER/PROPH/DIAG INJ SC/IM: CPT | Performed by: NURSE PRACTITIONER

## 2024-12-24 RX ORDER — EPINEPHRINE 1 MG/ML
0.3 INJECTION, SOLUTION INTRAMUSCULAR; SUBCUTANEOUS EVERY 5 MIN PRN
OUTPATIENT
Start: 2024-12-30

## 2024-12-24 RX ORDER — HEPARIN SODIUM (PORCINE) LOCK FLUSH IV SOLN 100 UNIT/ML 100 UNIT/ML
5 SOLUTION INTRAVENOUS
OUTPATIENT
Start: 2024-12-24

## 2024-12-24 RX ORDER — ALBUTEROL SULFATE 0.83 MG/ML
2.5 SOLUTION RESPIRATORY (INHALATION)
OUTPATIENT
Start: 2024-12-30

## 2024-12-24 RX ORDER — MEPERIDINE HYDROCHLORIDE 25 MG/ML
25 INJECTION INTRAMUSCULAR; INTRAVENOUS; SUBCUTANEOUS
OUTPATIENT
Start: 2024-12-30

## 2024-12-24 RX ORDER — HEPARIN SODIUM (PORCINE) LOCK FLUSH IV SOLN 100 UNIT/ML 100 UNIT/ML
5 SOLUTION INTRAVENOUS
Status: DISCONTINUED | OUTPATIENT
Start: 2024-12-24 | End: 2024-12-24 | Stop reason: HOSPADM

## 2024-12-24 RX ORDER — ALBUTEROL SULFATE 90 UG/1
1-2 INHALANT RESPIRATORY (INHALATION)
Start: 2024-12-30

## 2024-12-24 RX ORDER — CYANOCOBALAMIN 1000 UG/ML
1000 INJECTION, SOLUTION INTRAMUSCULAR; SUBCUTANEOUS ONCE
Start: 2024-12-30 | End: 2024-12-30

## 2024-12-24 RX ORDER — METHYLPREDNISOLONE SODIUM SUCCINATE 40 MG/ML
40 INJECTION INTRAMUSCULAR; INTRAVENOUS
Start: 2024-12-30

## 2024-12-24 RX ORDER — DIPHENHYDRAMINE HYDROCHLORIDE 50 MG/ML
25 INJECTION INTRAMUSCULAR; INTRAVENOUS
Start: 2024-12-30

## 2024-12-24 RX ORDER — DIPHENHYDRAMINE HYDROCHLORIDE 50 MG/ML
50 INJECTION INTRAMUSCULAR; INTRAVENOUS
Start: 2024-12-30

## 2024-12-24 RX ORDER — CYANOCOBALAMIN 1000 UG/ML
1000 INJECTION, SOLUTION INTRAMUSCULAR; SUBCUTANEOUS ONCE
Status: COMPLETED | OUTPATIENT
Start: 2024-12-24 | End: 2024-12-24

## 2024-12-24 RX ORDER — HEPARIN SODIUM,PORCINE 10 UNIT/ML
5-20 VIAL (ML) INTRAVENOUS DAILY PRN
OUTPATIENT
Start: 2024-12-24

## 2024-12-24 RX ADMIN — CYANOCOBALAMIN 1000 MCG: 1000 INJECTION, SOLUTION INTRAMUSCULAR; SUBCUTANEOUS at 09:03

## 2024-12-24 ASSESSMENT — ACTIVITIES OF DAILY LIVING (ADL): ADLS_ACUITY_SCORE: 57

## 2024-12-24 NOTE — ED TRIAGE NOTES
Last chemo 10/21/24; awaiting PET scan if further treatments needed. Pt on Eliquis for hx bypass. Yesterday dtr noticed pt L eye sclera bright red. Pt denies injury; redness increased today. Denies vision changes or dizziness. ABC in tact. A/OX4     Triage Assessment (Adult)       Row Name 12/24/24 1234          Triage Assessment    Airway WDL WDL        Respiratory WDL    Respiratory WDL WDL        Skin Circulation/Temperature WDL    Skin Circulation/Temperature WDL WDL        Cardiac WDL    Cardiac WDL WDL        Peripheral/Neurovascular WDL    Peripheral Neurovascular WDL WDL        Cognitive/Neuro/Behavioral WDL    Cognitive/Neuro/Behavioral WDL WDL

## 2024-12-24 NOTE — PROGRESS NOTES
"  PET CT scan revealed  1 Findings of a complete metabolic response. No residual soft tissue  component of the primary left parotid mass with no remaining enlarged  cervical lymph nodes by short axis criteria.  2a. New groundglass nodularity in the lung apices suspicious for an  infectious/inflammatory process.  2b. Stable appearance of several mildly enlarged mediastinal nodes  with low level uptake, possibly reactive.    Patient is scheduled on January 2nd to review PET CT scan result with Dr. Butler . I sent the results to Dr. Butler's in basket  Regarding  \"New groundglass nodularity in the lung apices suspicious for an  infectious/inflammatory process\" I called patient .  Patient reports he is feeling well  Patient denies fever chills sweats cough shortness of breath chest pain or fatigue or any signs symptoms of infection  Signs symptoms of infection reviewed  I Advised patient to check temperature frequently.  In the event of fever chills sweats cough shortness of breath chest pain advised patient to go to ER-patient verbalized understanding    JAKI Spaulding [facility]  Cancer  [facility]          "

## 2024-12-24 NOTE — PROGRESS NOTES
Infusion Nursing Note:  Pascual NASRIN Perea presents today for Peripheral labs/B12.    Patient seen by provider today: No   present during visit today: Not Applicable.    Note: N/A.      Intravenous Access:  Lab draw site Right AC, Needle type Butterfly, Gauge 23.  Labs drawn without difficulty.    Treatment Conditions:  Not Applicable.      Post Infusion Assessment:  Patient tolerated injection without incident.  Site patent and intact, free from redness, edema or discomfort.  No evidence of extravasations.       Discharge Plan:   Patient declined prescription refills.  Discharge instructions reviewed with: Patient.  Patient and/or family verbalized understanding of discharge instructions and all questions answered.  AVS to patient via [employer].    Patient discharged in stable condition accompanied by: self.  Departure Mode: Ambulatory.      Alvaro Lugo RN

## 2024-12-24 NOTE — ED PROVIDER NOTES
Emergency Department Note      History of Present Illness     Chief Complaint   No chief complaint on file.      HPI   Pascual Perea is a 84 year old male with history of hypertension and high grade neuroendocrine carcinoma who presents for redness in left eye. Patient is on Eliquis for hx of bypass. He has had low platelets for a while and was told to present to ED if he noticed bleeding. His daughter noticed reddened eye yesterday. She states redness has spread throughout eye since she first noticed it. Patient reports slight itching and dryness in eye. No pain in eye. He denies bleeding elsewhere. No fever, headache, chest pain, or chills. No dark stool. No recent falls. No contact use. Last chemo 10/21/24. He is awaiting PET scan if further treatments are needed.     Independent Historian   None    Review of External Notes   Oncology note 11/13/24 -- High grade neuroendocrine carcinoma.  Hemoglobin 9.2. platelets 64. White count 2.6.    Past Medical History     Medical History and Problem List   Past Medical History:   Diagnosis Date    Adhesive capsulitis of shoulder     Anemia 03/10/2014    Anemia, unspecified     Antiplatelet or antithrombotic long-term use     Arrhythmia     CAD (coronary artery disease)     Gastroparesis     History of cardioversion 04/24/2018    History of cardioversion 02/13/2019    Hyperlipidemia LDL goal <70 05/26/2011    Hypertension     Hypothyroidism     Impotence of organic origin     Laceration of finger 06/2010    Numbness and tingling     BRANDON (obstructive sleep apnea)     Osteopenia     Pulmonary hypertension (H) 04/06/2012    Sensorineural hearing loss, unspecified     Stented coronary artery 1997    Testosterone deficiency     Type 2 diabetes mellitus without complication  (goal A1C<8) 10/24/2015    Typical atrial flutter (H) 04/18/2016    Unspecified hypothyroidism     Vitamin D deficiency 09/03/2011       Medications   atorvastatin (LIPITOR) 40 MG tablet  azelastine 137  MCG/SPRAY SOLN  blood glucose (ACCU-CHEK NORMAN PLUS) test strip  blood glucose calibration (ACCU-CHEK NORMAN) solution  Continuous Blood Gluc  (FREESTYLE DELFINA 2 READER) CHRYSTAL  Continuous Blood Gluc Sensor (FREESTYLE DELFINA 2 SENSOR) MISC  fenofibrate micronized (LOFIBRA) 67 MG capsule  glucose 4 g CHEW  Insulin Aspart FlexPen 100 UNIT/ML SOPN  insulin glargine (LANTUS SOLOSTAR) 100 UNIT/ML pen  insulin pen needle (GLOBAL EASE INJECT PEN NEEDLES) 31G X 5 MM miscellaneous  isosorbide mononitrate (IMDUR) 30 MG 24 hr tablet  levothyroxine (SYNTHROID/LEVOTHROID) 125 MCG tablet  nitroGLYcerin (NITROSTAT) 0.4 MG sublingual tablet        Surgical History   Past Surgical History:   Procedure Laterality Date    ANESTHESIA CARDIOVERSION N/A 02/13/2019    Procedure: ANESTHESIA CARDIOVERSION;  Surgeon: GENERIC ANESTHESIA PROVIDER;  Location:  OR    ANESTHESIA CARDIOVERSION N/A 05/22/2019    Procedure: ANESTHESIA, FOR CARDIOVERSION;  Surgeon: GENERIC ANESTHESIA PROVIDER;  Location:  OR    CARDIAC SURGERY      bypass in 1997    CARDIOVERSION  04/24/2018    COLONOSCOPY      CORONARY ANGIOGRAPHY ADULT ORDER      4/2/2012    CORONARY ARTERY BYPASS  1997    [facility]    ENDOSCOPIC ULTRASOUND UPPER GASTROINTESTINAL TRACT (GI) N/A 03/14/2019    Procedure: ENDOSCOPIC ULTRASOUND OF UPPER GASTROINTESTINAL TRACT;  Surgeon: Ju Torres MD;  Location:  OR    ESOPHAGOSCOPY, GASTROSCOPY, DUODENOSCOPY (EGD), COMBINED N/A 4/15/2022    Procedure: ESOPHAGOGASTRODUODENOSCOPY (EGD);  Surgeon: Erik Baca DO;  Location:  GI    EXCISE MASS HEAD Left 08/18/2016    Procedure: EXCISE MASS HEAD;  Surgeon: Ivan Hernandez MD;  Location:  SD    HEART CATH, ANGIOPLASTY  04/2012    IR CHEST PORT PLACEMENT > 5 YRS OF AGE  8/9/2024    LAPAROSCOPIC CHOLECYSTECTOMY N/A 03/17/2019    Procedure: LAPAROSCOPIC CHOLECYSTECTOMY;  Surgeon: Janel Paz MD;  Location:  OR    PHACOEMULSIFICATION CLEAR  CORNEA WITH STANDARD INTRAOCULAR LENS IMPLANT Left 06/08/2015    Procedure: PHACOEMULSIFICATION CLEAR CORNEA WITH STANDARD INTRAOCULAR LENS IMPLANT;  Surgeon: Nixon Farnsworth MD;  Location:  EC    PHACOEMULSIFICATION CLEAR CORNEA WITH STANDARD INTRAOCULAR LENS IMPLANT Right 06/22/2015    Procedure: PHACOEMULSIFICATION CLEAR CORNEA WITH STANDARD INTRAOCULAR LENS IMPLANT;  Surgeon: Nixon Farnsworth MD;  Location:  EC    SEPTOPLASTY, TURBINOPLASTY, COMBINED Bilateral 08/18/2016    Procedure: COMBINED SEPTOPLASTY, TURBINOPLASTY;  Surgeon: Ivan Hernandez MD;  Location: [facility] NONSPECIFIC PROCEDURE  1997    CABG (Restorationist)    [facility] NONSPECIFIC PROCEDURE  08/2001    stress echo       Physical Exam     Patient Vitals for the past 24 hrs:   BP Temp Temp src Pulse Resp SpO2 Weight   12/24/24 1237 134/53 98.7  F (37.1  C) Temporal 56 16 99 % 86.1 kg (189 lb 13.1 oz)     Physical Exam  General: Resting on the chair  Head: No obvious trauma to head.  Ears, Nose, Throat:  External ears normal.  Nose normal.    Eyes:  Conjunctivae clear right, left with subconjunctival hemorrhage noted on the medial, inferior and lateral aspect.  Spares the superior aspect.  No traumatic hyphema.  Extraocular eye movement intact.  Pupils are equal, round, and reactive.  Micturate 19 both eyes.  Negative Quinn sign.  No abnormal uptake, laceration, abrasion or ulceration noted with fluorescein staining.  CV: Regular rate and rhythm.  No murmurs.      Respiratory: Effort normal and breath sounds normal.  No wheezing or crackles.   Gastrointestinal: Soft.  No distension. There is no tenderness.   Neuro: Alert. Moving all extremities appropriately.  Normal speech.    Skin: Skin is warm and dry.  No rash noted.       Diagnostics     Lab Results   Labs Ordered and Resulted from Time of ED Arrival to Time of ED Departure - No data to display    Imaging   No orders to display       Independent Interpretation   None    ED Course       Medications Administered   Medications - No data to display      Procedures   Procedures     Discussion of Management   Oncology see below    ED Course   ED Course as of 12/24/24 2225   Tue Dec 24, 2024   1335 I obtained history and examined the patient as noted above.     3000 I spoke with Dr. Butler, oncology, regarding the patient.  He agrees with holding the Eliquis for 2 days to help with resolution of the subconjunctival hemorrhage.  Okay to continue from the platelets perspective.  Has follow-up next week.  Encouraged him to follow-up appropriately.       Additional Documentation  None    Medical Decision Making / Diagnosis     CMS Diagnoses: None    MIPS       None    MDM   Pascual Perea is a 84 year old male who presents emergency department with subconjunctival hematoma.  Vital signs stable.  Broad differential was considered include not limited to coagulopathy, trauma, open globe, glaucoma, infection, etc.  Overall patient is well-appearing nontoxic.  He has been rubbing and scratching the eye somewhat.  No contacts.  Low suspicion for infection.  Pressures are normal not suggestive of acute angle-closure glaucoma.  No significant trauma, negative Quinn sign, no evidence of open globe or other traumatic injury.  No visible ulceration, laceration, abrasion noted on fluorescein staining.  This seems to be likely related to patient's underlying thrombocytopenia and Eliquis.  Spoke with oncology they are agreeable to holding the patient's Eliquis for 2 days to help with resolution of this.  They felt like his platelets are appropriate otherwise.  No indication for transfusion.  Patient otherwise looks well.  No other symptoms at this time.  Have recommended close follow-up with oncology as well as ophthalmology to ensure resolution.  Return precautions including worsening pain, fevers, vision changes etc. were discussed.    Disposition   The patient was discharged.     Diagnosis     ICD-10-CM    1.  Subconjunctival hemorrhage of left eye  H11.32            Discharge Medications   Discharge Medication List as of 12/24/2024  1:57 PM            Scribe Disclosure:  I, Tigist Chacon, am serving as a scribe at 1:33 PM on 12/24/2024 to document services personally performed by Savanah Hi MD based on my observations and the provider's statements to me.        Savanah Hi MD  12/24/24 2617

## 2024-12-24 NOTE — DISCHARGE INSTRUCTIONS
Please try to avoid any further rubbing of the eye.  This should gradually resolve in the next 2 to 3 weeks.  Follow-up with your oncologist as scheduled next week.  Consider holding your Eliquis for the next 2 days to help with resolution of this bleeding.  Return to the ER if having worsening pain, vision impairment, worsening redness or swelling or other concerns.  Consider follow-up with ophthalmology to ensure this is resolving normally.

## 2024-12-26 NOTE — RESULT ENCOUNTER NOTE
Dear  Eliazar,    PET scan is good. No evidence of cancer. We will review it during appointment.    Please, call me with any questions.    Florencia Butler MD       Alert and oriented to person, place, time/situation. normal mood and affect.

## 2025-01-02 ENCOUNTER — ONCOLOGY VISIT (OUTPATIENT)
Dept: ONCOLOGY | Facility: CLINIC | Age: 85
End: 2025-01-02
Attending: INTERNAL MEDICINE
Payer: COMMERCIAL

## 2025-01-02 ENCOUNTER — LAB (OUTPATIENT)
Dept: INFUSION THERAPY | Facility: CLINIC | Age: 85
End: 2025-01-02
Attending: INTERNAL MEDICINE
Payer: COMMERCIAL

## 2025-01-02 VITALS
SYSTOLIC BLOOD PRESSURE: 129 MMHG | HEART RATE: 65 BPM | RESPIRATION RATE: 16 BRPM | WEIGHT: 189 LBS | DIASTOLIC BLOOD PRESSURE: 72 MMHG | OXYGEN SATURATION: 98 % | BODY MASS INDEX: 26.36 KG/M2

## 2025-01-02 DIAGNOSIS — C7A.1 HIGH GRADE NEUROENDOCRINE CARCINOMA (H): ICD-10-CM

## 2025-01-02 DIAGNOSIS — D51.3 OTHER DIETARY VITAMIN B12 DEFICIENCY ANEMIA: ICD-10-CM

## 2025-01-02 DIAGNOSIS — D51.3 OTHER DIETARY VITAMIN B12 DEFICIENCY ANEMIA: Primary | ICD-10-CM

## 2025-01-02 DIAGNOSIS — D64.9 ANEMIA, UNSPECIFIED TYPE: ICD-10-CM

## 2025-01-02 DIAGNOSIS — C7A.1 HIGH GRADE NEUROENDOCRINE CARCINOMA (H): Primary | ICD-10-CM

## 2025-01-02 LAB
ALBUMIN SERPL BCG-MCNC: 3.8 G/DL (ref 3.5–5.2)
ALP SERPL-CCNC: 50 U/L (ref 40–150)
ALT SERPL W P-5'-P-CCNC: 7 U/L (ref 0–70)
ANION GAP SERPL CALCULATED.3IONS-SCNC: 9 MMOL/L (ref 7–15)
AST SERPL W P-5'-P-CCNC: 14 U/L (ref 0–45)
BASOPHILS # BLD AUTO: 0 10E3/UL (ref 0–0.2)
BASOPHILS NFR BLD AUTO: 1 %
BILIRUB SERPL-MCNC: 0.7 MG/DL
BUN SERPL-MCNC: 13.6 MG/DL (ref 8–23)
CALCIUM SERPL-MCNC: 8.9 MG/DL (ref 8.8–10.4)
CHLORIDE SERPL-SCNC: 112 MMOL/L (ref 98–107)
CREAT SERPL-MCNC: 1.02 MG/DL (ref 0.67–1.17)
EGFRCR SERPLBLD CKD-EPI 2021: 73 ML/MIN/1.73M2
EOSINOPHIL # BLD AUTO: 0.1 10E3/UL (ref 0–0.7)
EOSINOPHIL NFR BLD AUTO: 6 %
ERYTHROCYTE [DISTWIDTH] IN BLOOD BY AUTOMATED COUNT: 14.9 % (ref 10–15)
GLUCOSE SERPL-MCNC: 85 MG/DL (ref 70–99)
HCO3 SERPL-SCNC: 22 MMOL/L (ref 22–29)
HCT VFR BLD AUTO: 27.2 % (ref 40–53)
HGB BLD-MCNC: 8.8 G/DL (ref 13.3–17.7)
IMM GRANULOCYTES # BLD: 0 10E3/UL
IMM GRANULOCYTES NFR BLD: 0 %
IRON BINDING CAPACITY (ROCHE): 238 UG/DL (ref 240–430)
IRON SATN MFR SERPL: 19 % (ref 15–46)
IRON SERPL-MCNC: 45 UG/DL (ref 61–157)
LYMPHOCYTES # BLD AUTO: 0.3 10E3/UL (ref 0.8–5.3)
LYMPHOCYTES NFR BLD AUTO: 14 %
MCH RBC QN AUTO: 30.9 PG (ref 26.5–33)
MCHC RBC AUTO-ENTMCNC: 32.4 G/DL (ref 31.5–36.5)
MCV RBC AUTO: 95 FL (ref 78–100)
MONOCYTES # BLD AUTO: 0.3 10E3/UL (ref 0–1.3)
MONOCYTES NFR BLD AUTO: 13 %
NEUTROPHILS # BLD AUTO: 1.5 10E3/UL (ref 1.6–8.3)
NEUTROPHILS NFR BLD AUTO: 67 %
NRBC # BLD AUTO: 0 10E3/UL
NRBC BLD AUTO-RTO: 0 /100
PLATELET # BLD AUTO: 61 10E3/UL (ref 150–450)
POTASSIUM SERPL-SCNC: 3.8 MMOL/L (ref 3.4–5.3)
PROT SERPL-MCNC: 5.8 G/DL (ref 6.4–8.3)
RBC # BLD AUTO: 2.85 10E6/UL (ref 4.4–5.9)
SODIUM SERPL-SCNC: 143 MMOL/L (ref 135–145)
TSH SERPL DL<=0.005 MIU/L-ACNC: 0.36 UIU/ML (ref 0.3–4.2)
VIT B12 SERPL-MCNC: 362 PG/ML (ref 232–1245)
WBC # BLD AUTO: 2.2 10E3/UL (ref 4–11)

## 2025-01-02 PROCEDURE — 250N000011 HC RX IP 250 OP 636: Performed by: INTERNAL MEDICINE

## 2025-01-02 PROCEDURE — 250N000011 HC RX IP 250 OP 636: Performed by: NURSE PRACTITIONER

## 2025-01-02 PROCEDURE — 83540 ASSAY OF IRON: CPT | Performed by: INTERNAL MEDICINE

## 2025-01-02 PROCEDURE — G0463 HOSPITAL OUTPT CLINIC VISIT: HCPCS | Performed by: INTERNAL MEDICINE

## 2025-01-02 PROCEDURE — 83550 IRON BINDING TEST: CPT | Performed by: INTERNAL MEDICINE

## 2025-01-02 PROCEDURE — 80053 COMPREHEN METABOLIC PANEL: CPT | Performed by: INTERNAL MEDICINE

## 2025-01-02 PROCEDURE — 85025 COMPLETE CBC W/AUTO DIFF WBC: CPT | Performed by: INTERNAL MEDICINE

## 2025-01-02 PROCEDURE — 84443 ASSAY THYROID STIM HORMONE: CPT | Performed by: INTERNAL MEDICINE

## 2025-01-02 PROCEDURE — 96372 THER/PROPH/DIAG INJ SC/IM: CPT | Performed by: INTERNAL MEDICINE

## 2025-01-02 PROCEDURE — 36591 DRAW BLOOD OFF VENOUS DEVICE: CPT

## 2025-01-02 PROCEDURE — 82607 VITAMIN B-12: CPT | Performed by: INTERNAL MEDICINE

## 2025-01-02 RX ORDER — ALBUTEROL SULFATE 0.83 MG/ML
2.5 SOLUTION RESPIRATORY (INHALATION)
OUTPATIENT
Start: 2025-01-07

## 2025-01-02 RX ORDER — HEPARIN SODIUM,PORCINE 10 UNIT/ML
5-20 VIAL (ML) INTRAVENOUS DAILY PRN
OUTPATIENT
Start: 2025-01-02

## 2025-01-02 RX ORDER — ALBUTEROL SULFATE 90 UG/1
1-2 INHALANT RESPIRATORY (INHALATION)
Start: 2025-01-07

## 2025-01-02 RX ORDER — DIPHENHYDRAMINE HYDROCHLORIDE 50 MG/ML
25 INJECTION INTRAMUSCULAR; INTRAVENOUS
Start: 2025-01-07

## 2025-01-02 RX ORDER — HEPARIN SODIUM (PORCINE) LOCK FLUSH IV SOLN 100 UNIT/ML 100 UNIT/ML
5 SOLUTION INTRAVENOUS
OUTPATIENT
Start: 2025-01-02

## 2025-01-02 RX ORDER — LANOLIN ALCOHOL/MO/W.PET/CERES
1000 CREAM (GRAM) TOPICAL DAILY
COMMUNITY
Start: 2025-01-02

## 2025-01-02 RX ORDER — CYANOCOBALAMIN 1000 UG/ML
1000 INJECTION, SOLUTION INTRAMUSCULAR; SUBCUTANEOUS ONCE
Start: 2025-01-07 | End: 2025-01-07

## 2025-01-02 RX ORDER — CYANOCOBALAMIN 1000 UG/ML
1000 INJECTION, SOLUTION INTRAMUSCULAR; SUBCUTANEOUS ONCE
Status: COMPLETED | OUTPATIENT
Start: 2025-01-02 | End: 2025-01-02

## 2025-01-02 RX ORDER — MEPERIDINE HYDROCHLORIDE 25 MG/ML
25 INJECTION INTRAMUSCULAR; INTRAVENOUS; SUBCUTANEOUS
OUTPATIENT
Start: 2025-01-07

## 2025-01-02 RX ORDER — HEPARIN SODIUM (PORCINE) LOCK FLUSH IV SOLN 100 UNIT/ML 100 UNIT/ML
5 SOLUTION INTRAVENOUS
Status: DISCONTINUED | OUTPATIENT
Start: 2025-01-02 | End: 2025-01-02 | Stop reason: HOSPADM

## 2025-01-02 RX ORDER — DIPHENHYDRAMINE HYDROCHLORIDE 50 MG/ML
50 INJECTION INTRAMUSCULAR; INTRAVENOUS
Start: 2025-01-07

## 2025-01-02 RX ORDER — EPINEPHRINE 1 MG/ML
0.3 INJECTION, SOLUTION INTRAMUSCULAR; SUBCUTANEOUS EVERY 5 MIN PRN
OUTPATIENT
Start: 2025-01-07

## 2025-01-02 RX ORDER — METHYLPREDNISOLONE SODIUM SUCCINATE 40 MG/ML
40 INJECTION INTRAMUSCULAR; INTRAVENOUS
Start: 2025-01-07

## 2025-01-02 RX ADMIN — Medication 5 ML: at 09:28

## 2025-01-02 RX ADMIN — CYANOCOBALAMIN 1000 MCG: 1000 INJECTION, SOLUTION INTRAMUSCULAR; SUBCUTANEOUS at 09:36

## 2025-01-02 ASSESSMENT — PAIN SCALES - GENERAL: PAINLEVEL_OUTOF10: NO PAIN (0)

## 2025-01-02 NOTE — PROGRESS NOTES
Nursing Note:  Pascual Perea presents today for Port Labs.    Patient seen by provider today: Yes: Dr. Butler   present during visit today: Not Applicable.    Note: N/A.    Intravenous Access:  Implanted Port.    Discharge Plan:   Patient was sent to [facility] for provider appointment.    Laurie Lundberg RN

## 2025-01-02 NOTE — PATIENT INSTRUCTIONS
-Labs today.  -Port removal when he wants.  -CT scan and labs in 6 months.  -Start oral vitamin B12 1000 mcg once a day.  -Follow-up in 6 months.

## 2025-01-02 NOTE — PROGRESS NOTES
ONCOLOGY HISTORY: Mr. 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     1.  CT neck on 06/20/2024 revealed 2.8 cm mass in left parotid gland.  Also revealed multiple enlarged left-sided cervical lymph node.  2.  Ultrasound-guided biopsy of left cervical mass on 06/28/2024 revealed METASTATIC HIGH-GRADE NEUROENDOCRINE CARCINOMA, LARGE CELL TYPE, at least 1.2 cm in linear extent, extending to biopsy margins.  3.  PET scan on 07/19/2024 revealed hypermetabolic left parotid mass and multiple hypermetabolic left cervical lymphadenopathy.  Severely stenotic left internal jugular vein secondary to mass effect.  4. He was seen by ENT at [facility].  Flexible fiberoptic laryngoscopy on 07/15/2024 was normal.  5. Case discussed at tumor board at [facility].  Tumor board recommendation was to treat it as small cell lung cancer.  Recommendation was to give him 4 cycle of platinum and etoposide and add radiation with cycle 2 or 3.  6. Carboplatin and etoposide x 4 cycles between 08/14/2024 and 10/18/2024 (carboplatin chosen because of CKD, hearing loss and his age).  -Radiation to left parotid between 09/09/2024 and 10/21/2024.  6000 cGy in 30 fractions.     Subjective:   Daughter came with the patient.     Mr. 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 It was treated as limited stage  small cell lung cancer with concurrent chemoradiation. He completed 4 cycle of carboplatin and etoposide on 10/18/2024.  He completed radiation on 10/21/2024.      PET scan on 12/23/2024:  1. Findings of a complete metabolic response. No residual soft tissue component of the primary left parotid mass with no remaining enlarged cervical lymph nodes by short axis criteria.  2a. New groundglass nodularity in the lung  apices suspicious for an infectious/inflammatory process.  2b. Stable appearance of several mildly enlarged mediastinal nodes with low level uptake, possibly reactive.     Patient is doing well.  He has mild generalized weakness.  He is able to do all his activities.  No headache.  No dizziness.  Occasional pain in the throat.  No problem swallowing.  No neck pain.  He does not feel any lump in the neck.  No chest pain.  No shortness of breath.  No abdominal pain.  No nausea.  No vomiting.  No urinary complaints.  No bowel problem.  No bleeding.  He has mild numbness and tingling in the extremities.  It is slowly improving.      Exam:  He is alert and oriented x 3.  He looked very weak.  Vitals: Reviewed.  Rest of the system is not examined.     Labs: CBC done on 12/24/2024 reviewed.     Assessment:  1.  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  He is in complete remission.  2.  Mild generalized weakness, improving.  3.  Vitamin B12 deficiency on replacement.  4.  Grade 1 peripheral neuropathy, stable.  5.  Pancytopenia from chemoradiation.      Plan:  -Labs today.  -Port removal when he wants.  -CT scan and labs in 6 months.  -Vitamin B12 injection today.  After today, start oral vitamin B12 1000 mcg once a day.  -Follow-up in 6 months.     Discussion:  1.  Patient feels much better.  He is not as weak and tired.  He can eat and drink good.    PET scan was reviewed with the patient and his daughter.  He is in complete remission.  He was happy to know that.    Explained to the patient that we will continue to monitor him.  Will get CT neck and chest in 6 months.  He is agreeable for it.    2.  Patient has pancytopenia.  This is from his chemoradiation.  Anemia is also from vitamin B-12 deficiency.  Will check a CBC today.  Explained to him that pancytopenia will improve with  time.    3.  He has vitamin B12 deficiency.  He is on vitamin B12 injection.  Will give him vitamin B12 injection today and then stop after that.  Patient advised to start taking oral vitamin B12.  Will monitor his vitamin B12 level.    4.  He has grade 1 peripheral neuropathy which should improve with time.  It does not cause restriction of activities.    5.  He has Port-A-Cath.  Discussed regarding removal.  He will let us know when he wants to have it removed.    6.  He and his daughter had few questions which were all answered.  I will see him in 6 months.  Advised him to call us with any questions or concerns.     Total visit time of 30 minutes.  Time spent in today's visit, review of chart/investigations today and documentation today.

## 2025-01-02 NOTE — LETTER
1/2/2025      Pascual Perea  [address]  [place] [address]      Dear Colleague,    Thank you for referring your patient, Pascual Perea, to the [facility]. Please see a copy of my visit note below.    ONCOLOGY HISTORY: Mr. 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     1.  CT neck on 06/20/2024 revealed 2.8 cm mass in left parotid gland.  Also revealed multiple enlarged left-sided cervical lymph node.  2.  Ultrasound-guided biopsy of left cervical mass on 06/28/2024 revealed METASTATIC HIGH-GRADE NEUROENDOCRINE CARCINOMA, LARGE CELL TYPE, at least 1.2 cm in linear extent, extending to biopsy margins.  3.  PET scan on 07/19/2024 revealed hypermetabolic left parotid mass and multiple hypermetabolic left cervical lymphadenopathy.  Severely stenotic left internal jugular vein secondary to mass effect.  4. He was seen by ENT at [facility].  Flexible fiberoptic laryngoscopy on 07/15/2024 was normal.  5. Case discussed at tumor board at [facility].  Tumor board recommendation was to treat it as small cell lung cancer.  Recommendation was to give him 4 cycle of platinum and etoposide and add radiation with cycle 2 or 3.  6. Carboplatin and etoposide x 4 cycles between 08/14/2024 and 10/18/2024 (carboplatin chosen because of CKD, hearing loss and his age).  -Radiation to left parotid between 09/09/2024 and 10/21/2024.  6000 cGy in 30 fractions.     Subjective:   Daughter came with the patient.     Mr. 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 It was treated as limited stage  small cell lung cancer with concurrent chemoradiation. He completed 4 cycle of carboplatin and etoposide on 10/18/2024.  He completed radiation on 10/21/2024.      PET scan  on 12/23/2024:  1. Findings of a complete metabolic response. No residual soft tissue component of the primary left parotid mass with no remaining enlarged cervical lymph nodes by short axis criteria.  2a. New groundglass nodularity in the lung apices suspicious for an infectious/inflammatory process.  2b. Stable appearance of several mildly enlarged mediastinal nodes with low level uptake, possibly reactive.     Patient is doing well.  He has mild generalized weakness.  He is able to do all his activities.  No headache.  No dizziness.  Occasional pain in the throat.  No problem swallowing.  No neck pain.  He does not feel any lump in the neck.  No chest pain.  No shortness of breath.  No abdominal pain.  No nausea.  No vomiting.  No urinary complaints.  No bowel problem.  No bleeding.  He has mild numbness and tingling in the extremities.  It is slowly improving.      Exam:  He is alert and oriented x 3.  He looked very weak.  Vitals: Reviewed.  Rest of the system is not examined.     Labs: CBC done on 12/24/2024 reviewed.     Assessment:  1.  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  He is in complete remission.  2.  Mild generalized weakness, improving.  3.  Vitamin B12 deficiency on replacement.  4.  Grade 1 peripheral neuropathy, stable.  5.  Pancytopenia from chemoradiation.      Plan:  -Labs today.  -Port removal when he wants.  -CT scan and labs in 6 months.  -Vitamin B12 injection today.  After today, start oral vitamin B12 1000 mcg once a day.  -Follow-up in 6 months.     Discussion:  1.  Patient feels much better.  He is not as weak and tired.  He can eat and drink good.    PET scan was reviewed with the patient and his daughter.  He is in complete remission.  He was happy to know that.    Explained to the patient that we will continue to monitor him.  Will get CT  neck and chest in 6 months.  He is agreeable for it.    2.  Patient has pancytopenia.  This is from his chemoradiation.  Anemia is also from vitamin B-12 deficiency.  Will check a CBC today.  Explained to him that pancytopenia will improve with time.    3.  He has vitamin B12 deficiency.  He is on vitamin B12 injection.  Will give him vitamin B12 injection today and then stop after that.  Patient advised to start taking oral vitamin B12.  Will monitor his vitamin B12 level.    4.  He has grade 1 peripheral neuropathy which should improve with time.  It does not cause restriction of activities.    5.  He has Port-A-Cath.  Discussed regarding removal.  He will let us know when he wants to have it removed.    6.  He and his daughter had few questions which were all answered.  I will see him in 6 months.  Advised him to call us with any questions or concerns.     Total visit time of 30 minutes.  Time spent in today's visit, review of chart/investigations today and documentation today.      Again, thank you for allowing me to participate in the care of your patient.        Sincerely,        Florencia Butler MD    Electronically signed

## 2025-01-02 NOTE — RESULT ENCOUNTER NOTE
Dear Mr. Perea,    Blood test reveals few abnormalities.  -WBC remains low at 2.2.  Hemoglobin remains low at 8.8.  Platelet remains low at 61.  This should improve with time.  Please have another CBC checked in a month.  -Iron is mildly low.  Please eat more iron rich food.  -Thyroid test, TSH, is normal.    Please have blood test rechecked in local clinic in 1 month.    Please, call me with any questions.    Florencia Butler MD

## 2025-01-21 ENCOUNTER — DOCUMENTATION ONLY (OUTPATIENT)
Dept: ONCOLOGY | Facility: CLINIC | Age: 85
End: 2025-01-21
Payer: COMMERCIAL

## 2025-01-21 NOTE — PROGRESS NOTES
Pascual Perea has an upcoming lab appointment:    Future Appointments   Date Time Provider Department Center   1/31/2025  8:45 AM [facility]   2/14/2025  1:30 PM Andrew Elizalde MD [facility]   7/7/2025  2:20 PM [facility]1 [facility]     Patient is scheduled for the following lab(s): iron recheck    There is no order available. Please review and place either future orders or HMPO (Review of Health Maintenance Protocol Orders), as appropriate.    Health Maintenance Due   Topic    ANNUAL REVIEW OF HM ORDERS     MICROALBUMIN     A1C      Lakesha Contreras

## 2025-01-27 ENCOUNTER — LAB (OUTPATIENT)
Dept: LAB | Facility: CLINIC | Age: 85
End: 2025-01-27
Payer: COMMERCIAL

## 2025-01-27 DIAGNOSIS — C7A.1 HIGH GRADE NEUROENDOCRINE CARCINOMA (H): ICD-10-CM

## 2025-01-27 LAB
BASOPHILS # BLD AUTO: 0 10E3/UL (ref 0–0.2)
BASOPHILS NFR BLD AUTO: 1 %
EOSINOPHIL # BLD AUTO: 0.2 10E3/UL (ref 0–0.7)
EOSINOPHIL NFR BLD AUTO: 5 %
ERYTHROCYTE [DISTWIDTH] IN BLOOD BY AUTOMATED COUNT: 15.8 % (ref 10–15)
HCT VFR BLD AUTO: 29.5 % (ref 40–53)
HGB BLD-MCNC: 9.7 G/DL (ref 13.3–17.7)
IMM GRANULOCYTES # BLD: 0 10E3/UL
IMM GRANULOCYTES NFR BLD: 1 %
LYMPHOCYTES # BLD AUTO: 0.6 10E3/UL (ref 0.8–5.3)
LYMPHOCYTES NFR BLD AUTO: 19 %
MCH RBC QN AUTO: 30.9 PG (ref 26.5–33)
MCHC RBC AUTO-ENTMCNC: 32.9 G/DL (ref 31.5–36.5)
MCV RBC AUTO: 94 FL (ref 78–100)
MONOCYTES # BLD AUTO: 0.4 10E3/UL (ref 0–1.3)
MONOCYTES NFR BLD AUTO: 12 %
NEUTROPHILS # BLD AUTO: 1.9 10E3/UL (ref 1.6–8.3)
NEUTROPHILS NFR BLD AUTO: 62 %
NRBC # BLD AUTO: 0 10E3/UL
NRBC BLD AUTO-RTO: 0 /100
PLATELET # BLD AUTO: 85 10E3/UL (ref 150–450)
RBC # BLD AUTO: 3.14 10E6/UL (ref 4.4–5.9)
WBC # BLD AUTO: 3 10E3/UL (ref 4–11)

## 2025-01-27 PROCEDURE — 36415 COLL VENOUS BLD VENIPUNCTURE: CPT

## 2025-01-27 PROCEDURE — 85025 COMPLETE CBC W/AUTO DIFF WBC: CPT

## 2025-02-02 ENCOUNTER — HEALTH MAINTENANCE LETTER (OUTPATIENT)
Age: 85
End: 2025-02-02

## 2025-02-04 ENCOUNTER — DOCUMENTATION ONLY (OUTPATIENT)
Dept: INTERNAL MEDICINE | Facility: CLINIC | Age: 85
End: 2025-02-04
Payer: COMMERCIAL

## 2025-02-04 DIAGNOSIS — E78.5 HYPERLIPIDEMIA LDL GOAL <70: ICD-10-CM

## 2025-02-04 DIAGNOSIS — E11.9 TYPE 2 DIABETES MELLITUS WITHOUT COMPLICATION, WITH LONG-TERM CURRENT USE OF INSULIN (H): Primary | ICD-10-CM

## 2025-02-04 DIAGNOSIS — E03.9 HYPOTHYROIDISM, UNSPECIFIED TYPE: ICD-10-CM

## 2025-02-04 DIAGNOSIS — Z79.4 TYPE 2 DIABETES MELLITUS WITHOUT COMPLICATION, WITH LONG-TERM CURRENT USE OF INSULIN (H): Primary | ICD-10-CM

## 2025-02-04 NOTE — PROGRESS NOTES
Pascual Perea has an upcoming lab appointment:    Future Appointments   Date Time Provider Department Center   2/10/2025  8:45 AM [facility]   2/14/2025  1:30 PM Andrew Elizalde MD [facility] OX   2/17/2025  9:00 AM [facility]1 [facility]   7/7/2025  2:20 PM [facility]1 [facility]   7/23/2025  2:15 PM Phoenix Adams MD [facility] PSA [facility]     Patient is scheduled for the following lab(s):   Scheduling Notes: Time for A1C check   Made On:  Confirmed: 2/2/2025 7:51 PM  2/4/2025 10:51 AM By:  By: AB ESQUIVEL [facility]       There is no order available. Please review and place either future orders or HMPO (Review of Health Maintenance Protocol Orders), as appropriate.    Health Maintenance Due   Topic    ANNUAL REVIEW OF HM ORDERS     MICROALBUMIN     A1C      Lakesha Contreras

## 2025-02-09 SDOH — HEALTH STABILITY: PHYSICAL HEALTH: ON AVERAGE, HOW MANY DAYS PER WEEK DO YOU ENGAGE IN MODERATE TO STRENUOUS EXERCISE (LIKE A BRISK WALK)?: 7 DAYS

## 2025-02-09 SDOH — HEALTH STABILITY: PHYSICAL HEALTH: ON AVERAGE, HOW MANY MINUTES DO YOU ENGAGE IN EXERCISE AT THIS LEVEL?: 20 MIN

## 2025-02-09 ASSESSMENT — SOCIAL DETERMINANTS OF HEALTH (SDOH): HOW OFTEN DO YOU GET TOGETHER WITH FRIENDS OR RELATIVES?: THREE TIMES A WEEK

## 2025-02-10 ENCOUNTER — LAB (OUTPATIENT)
Dept: LAB | Facility: CLINIC | Age: 85
End: 2025-02-10
Payer: COMMERCIAL

## 2025-02-10 ENCOUNTER — MYC MEDICAL ADVICE (OUTPATIENT)
Dept: INTERVENTIONAL RADIOLOGY/VASCULAR | Facility: CLINIC | Age: 85
End: 2025-02-10

## 2025-02-10 DIAGNOSIS — Z79.4 TYPE 2 DIABETES MELLITUS WITHOUT COMPLICATION, WITH LONG-TERM CURRENT USE OF INSULIN (H): ICD-10-CM

## 2025-02-10 DIAGNOSIS — E11.9 TYPE 2 DIABETES MELLITUS WITHOUT COMPLICATION, WITH LONG-TERM CURRENT USE OF INSULIN (H): ICD-10-CM

## 2025-02-10 DIAGNOSIS — E78.5 HYPERLIPIDEMIA LDL GOAL <70: ICD-10-CM

## 2025-02-10 DIAGNOSIS — E03.9 HYPOTHYROIDISM, UNSPECIFIED TYPE: ICD-10-CM

## 2025-02-10 LAB
ALBUMIN SERPL BCG-MCNC: 4.3 G/DL (ref 3.5–5.2)
ALP SERPL-CCNC: 60 U/L (ref 40–150)
ALT SERPL W P-5'-P-CCNC: 16 U/L (ref 0–70)
ANION GAP SERPL CALCULATED.3IONS-SCNC: 11 MMOL/L (ref 7–15)
AST SERPL W P-5'-P-CCNC: 25 U/L (ref 0–45)
BILIRUB SERPL-MCNC: 0.6 MG/DL
BUN SERPL-MCNC: 13.5 MG/DL (ref 8–23)
CALCIUM SERPL-MCNC: 10 MG/DL (ref 8.8–10.4)
CHLORIDE SERPL-SCNC: 107 MMOL/L (ref 98–107)
CHOLEST SERPL-MCNC: 110 MG/DL
CREAT SERPL-MCNC: 1.24 MG/DL (ref 0.67–1.17)
CREAT UR-MCNC: 124 MG/DL
EGFRCR SERPLBLD CKD-EPI 2021: 58 ML/MIN/1.73M2
EST. AVERAGE GLUCOSE BLD GHB EST-MCNC: 151 MG/DL
FASTING STATUS PATIENT QL REPORTED: YES
FASTING STATUS PATIENT QL REPORTED: YES
GLUCOSE SERPL-MCNC: 184 MG/DL (ref 70–99)
HBA1C MFR BLD: 6.9 % (ref 0–5.6)
HCO3 SERPL-SCNC: 23 MMOL/L (ref 22–29)
HDLC SERPL-MCNC: 42 MG/DL
LDLC SERPL CALC-MCNC: 43 MG/DL
MICROALBUMIN UR-MCNC: 353 MG/L
MICROALBUMIN/CREAT UR: 284.68 MG/G CR (ref 0–17)
NONHDLC SERPL-MCNC: 68 MG/DL
POTASSIUM SERPL-SCNC: 4.8 MMOL/L (ref 3.4–5.3)
PROT SERPL-MCNC: 6.7 G/DL (ref 6.4–8.3)
SODIUM SERPL-SCNC: 141 MMOL/L (ref 135–145)
TRIGL SERPL-MCNC: 127 MG/DL
TSH SERPL DL<=0.005 MIU/L-ACNC: 1.81 UIU/ML (ref 0.3–4.2)

## 2025-02-10 PROCEDURE — 82043 UR ALBUMIN QUANTITATIVE: CPT

## 2025-02-10 PROCEDURE — 80061 LIPID PANEL: CPT

## 2025-02-10 PROCEDURE — 84443 ASSAY THYROID STIM HORMONE: CPT

## 2025-02-10 PROCEDURE — 82570 ASSAY OF URINE CREATININE: CPT

## 2025-02-10 PROCEDURE — 36415 COLL VENOUS BLD VENIPUNCTURE: CPT

## 2025-02-10 PROCEDURE — 83036 HEMOGLOBIN GLYCOSYLATED A1C: CPT

## 2025-02-10 PROCEDURE — 80053 COMPREHEN METABOLIC PANEL: CPT

## 2025-02-14 ENCOUNTER — OFFICE VISIT (OUTPATIENT)
Dept: INTERNAL MEDICINE | Facility: CLINIC | Age: 85
End: 2025-02-14
Payer: COMMERCIAL

## 2025-02-14 VITALS
DIASTOLIC BLOOD PRESSURE: 70 MMHG | HEART RATE: 78 BPM | TEMPERATURE: 98.5 F | SYSTOLIC BLOOD PRESSURE: 136 MMHG | WEIGHT: 193.6 LBS | RESPIRATION RATE: 17 BRPM | BODY MASS INDEX: 27.72 KG/M2 | OXYGEN SATURATION: 97 % | HEIGHT: 70 IN

## 2025-02-14 DIAGNOSIS — I25.10 CORONARY ARTERY DISEASE INVOLVING NATIVE CORONARY ARTERY OF NATIVE HEART WITHOUT ANGINA PECTORIS: ICD-10-CM

## 2025-02-14 DIAGNOSIS — D64.9 ANEMIA, UNSPECIFIED TYPE: ICD-10-CM

## 2025-02-14 DIAGNOSIS — E11.22 TYPE 2 DIABETES MELLITUS WITH STAGE 3A CHRONIC KIDNEY DISEASE, WITH LONG-TERM CURRENT USE OF INSULIN (H): ICD-10-CM

## 2025-02-14 DIAGNOSIS — D69.6 THROMBOCYTOPENIA: ICD-10-CM

## 2025-02-14 DIAGNOSIS — I48.91 ATRIAL FIBRILLATION, UNSPECIFIED TYPE (H): ICD-10-CM

## 2025-02-14 DIAGNOSIS — C7A.1 MALIGNANT POORLY DIFFERENTIATED NEUROENDOCRINE CARCINOMA (H): ICD-10-CM

## 2025-02-14 DIAGNOSIS — N18.31 STAGE 3A CHRONIC KIDNEY DISEASE (H): ICD-10-CM

## 2025-02-14 DIAGNOSIS — E03.9 HYPOTHYROIDISM, UNSPECIFIED TYPE: Primary | ICD-10-CM

## 2025-02-14 DIAGNOSIS — Z01.818 PREOP EXAMINATION: ICD-10-CM

## 2025-02-14 DIAGNOSIS — Z79.4 TYPE 2 DIABETES MELLITUS WITH STAGE 3A CHRONIC KIDNEY DISEASE, WITH LONG-TERM CURRENT USE OF INSULIN (H): ICD-10-CM

## 2025-02-14 DIAGNOSIS — G47.33 OSA (OBSTRUCTIVE SLEEP APNEA): ICD-10-CM

## 2025-02-14 DIAGNOSIS — N18.31 TYPE 2 DIABETES MELLITUS WITH STAGE 3A CHRONIC KIDNEY DISEASE, WITH LONG-TERM CURRENT USE OF INSULIN (H): ICD-10-CM

## 2025-02-14 PROBLEM — R74.01 ALT (SGPT) LEVEL RAISED: Status: ACTIVE | Noted: 2025-02-14

## 2025-02-14 PROBLEM — Z86.0100 HISTORY OF COLONIC POLYPS: Status: ACTIVE | Noted: 2017-03-24

## 2025-02-14 PROBLEM — R18.8 ASCITES: Status: ACTIVE | Noted: 2025-02-14

## 2025-02-14 PROBLEM — R10.10 UPPER ABDOMINAL PAIN: Status: ACTIVE | Noted: 2025-02-14

## 2025-02-14 PROBLEM — D12.2 BENIGN NEOPLASM OF ASCENDING COLON: Status: ACTIVE | Noted: 2017-03-24

## 2025-02-14 PROBLEM — K64.9 HEMORRHOIDS: Status: ACTIVE | Noted: 2017-03-22

## 2025-02-14 PROBLEM — D12.3 BENIGN NEOPLASM OF TRANSVERSE COLON: Status: ACTIVE | Noted: 2017-03-24

## 2025-02-14 PROBLEM — R93.89 ABNORMAL CT SCAN: Status: ACTIVE | Noted: 2025-02-14

## 2025-02-14 PROBLEM — K59.00 CONSTIPATION: Status: ACTIVE | Noted: 2025-02-14

## 2025-02-14 PROBLEM — R12 HEARTBURN: Status: ACTIVE | Noted: 2025-02-14

## 2025-02-14 PROBLEM — K30 DELAYED GASTRIC EMPTYING: Status: ACTIVE | Noted: 2025-02-14

## 2025-02-14 LAB
ERYTHROCYTE [DISTWIDTH] IN BLOOD BY AUTOMATED COUNT: 15.7 % (ref 10–15)
HCT VFR BLD AUTO: 30.3 % (ref 40–53)
HGB BLD-MCNC: 9.9 G/DL (ref 13.3–17.7)
MCH RBC QN AUTO: 30.9 PG (ref 26.5–33)
MCHC RBC AUTO-ENTMCNC: 32.7 G/DL (ref 31.5–36.5)
MCV RBC AUTO: 95 FL (ref 78–100)
PLATELET # BLD AUTO: 86 10E3/UL (ref 150–450)
RBC # BLD AUTO: 3.2 10E6/UL (ref 4.4–5.9)
WBC # BLD AUTO: 3.8 10E3/UL (ref 4–11)

## 2025-02-14 PROCEDURE — 80048 BASIC METABOLIC PNL TOTAL CA: CPT | Performed by: INTERNAL MEDICINE

## 2025-02-14 PROCEDURE — 85027 COMPLETE CBC AUTOMATED: CPT | Performed by: INTERNAL MEDICINE

## 2025-02-14 PROCEDURE — 99214 OFFICE O/P EST MOD 30 MIN: CPT | Performed by: INTERNAL MEDICINE

## 2025-02-14 PROCEDURE — 36415 COLL VENOUS BLD VENIPUNCTURE: CPT | Performed by: INTERNAL MEDICINE

## 2025-02-14 RX ORDER — DEXTROSE 4 G
1 TABLET,CHEWABLE ORAL
COMMUNITY
Start: 2022-01-27

## 2025-02-14 RX ORDER — APIXABAN 5 MG/1
1 TABLET, FILM COATED ORAL
COMMUNITY
Start: 2025-02-10

## 2025-02-14 ASSESSMENT — PAIN SCALES - GENERAL: PAINLEVEL_OUTOF10: NO PAIN (0)

## 2025-02-14 NOTE — PROGRESS NOTES
Preventive Care Visit  [facility]  Andrew Elizalde MD, Internal Medicine  Feb 14, 2025  {Provider  Link to SmartSet :490431}    {PROVIDER CHARTING PREFERENCE:517721}    Max Naylor is a 84 year old, presenting for the following:  Medicare Visit        2/14/2025     1:01 PM   Additional Questions   Roomed by Yanelis MOELLER    {additonal problems for provider to add (Optional):304665}  Health Care Directive  Patient does not have a Health Care Directive: {ADVANCE_DIRECTIVE_STATUS:927632}      2/9/2025   General Health   How would you rate your overall physical health? (!) FAIR   Feel stress (tense, anxious, or unable to sleep) Not at all         2/9/2025   Nutrition   Diet: Diabetic    Carbohydrate counting       Multiple values from one day are sorted in reverse-chronological order         2/9/2025   Exercise   Days per week of moderate/strenous exercise 7 days   Average minutes spent exercising at this level 20 min         2/9/2025   Social Factors   Frequency of gathering with friends or relatives Three times a week   Worry food won't last until get money to buy more No   Food not last or not have enough money for food? No   Do you have housing? (Housing is defined as stable permanent housing and does not include staying ouside in a car, in a tent, in an abandoned building, in an overnight shelter, or couch-surfing.) Yes   Are you worried about losing your housing? No   Lack of transportation? No   Unable to get utilities (heat,electricity)? No         2/14/2025   Fall Risk   Gait Speed Test (Document in seconds) 5.13   Gait Speed Test Interpretation Greater than 5.01 seconds - ABNORMAL          2/9/2025   Activities of Daily Living- Home Safety   Needs help with the following daily activites None of the above   Safety concerns in the home None of the above         2/9/2025   Dental   Dentist two times every year? Yes         2/9/2025   Hearing Screening   Hearing concerns? (!) I  NEED TO ASK PEOPLE TO SPEAK UP OR REPEAT THEMSELVES.    (!) IT'S HARD TO FOLLOW A CONVERSATION IN A NOISY RESTAURANT OR CROWDED ROOM.    (!) TROUBLE UNDESTANDING A SPEAKER IN A PUBLIC MEETING OR [employer] SERVICE.    (!) TROUBLE UNDERSTANDING SOFT OR WHISPERED SPEECH.       Multiple values from one day are sorted in reverse-chronological order         2025   Driving Risk Screening   Patient/family members have concerns about driving No         2025   General Alertness/Fatigue Screening   Have you been more tired than usual lately? (!) YES         2025   Urinary Incontinence Screening   Bothered by leaking urine in past 6 months No         2024   TB Screening   Were you born outside of the [place]? No         Today's PHQ-2 Score:       2025     3:49 PM   PHQ-2 (  [employer])   Q1: Little interest or pleasure in doing things 0   Q2: Feeling down, depressed or hopeless 0   PHQ-2 Score 0    Q1: Little interest or pleasure in doing things Not at all   Q2: Feeling down, depressed or hopeless Not at all   PHQ-2 Score 0       Patient-reported           2025   Substance Use   Alcohol more than 3/day or more than 7/wk Not Applicable   Do you have a current opioid prescription? No   How severe/bad is pain from 1 to 10? 0/10 (No Pain)   Do you use any other substances recreationally? No     Social History     Tobacco Use    Smoking status: Former     Current packs/day: 0.00     Average packs/day: 1 pack/day for 6.5 years (6.5 ttl pk-yrs)     Types: Cigarettes, Cigars, Pipe     Start date:      Quit date: 1964     Years since quittin.6    Smokeless tobacco: Never    Tobacco comments:     10/16/24 Pt states he will an occasional cigar.    Vaping Use    Vaping status: Never Used   Substance Use Topics    Alcohol use: Not Currently     Comment: couple drinks a year    Drug use: No     {Provider  If there are gaps in the social history shown above, please follow the link to update and then refresh  the note Link to Social and Substance History :828017}    {Link to Fracture Risk Assessment Tool (Optional):893771}    {Provider  REQUIRED FOR AWV Use the storyboard to review patient history, after sections have been marked as reviewed, refresh note to capture documentation:892968}  {Provider   REQUIRED AWV use this link to review and update sexual activity history  after section has been marked as reviewed, refresh note to capture documentation:061354}  Reviewed and updated as needed this visit by Provider                    {HISTORY OPTIONS (Optional):176971}  Current providers sharing in care for this patient include:  Patient Care Team:  Andrew Elizalde MD as PCP - General  Andrew Elizalde MD as Assigned PCP  Krystal Jonse RD as Diabetes Educator (Dietitian, Registered)  Phoenix Adams MD as Assigned Heart and Vascular Provider  Chrissy Goddard PA-C as Assigned Sleep Provider  Mirta Desai, PharmD as Pharmacist (Pharmacist)  Antonietta Latif MD as Assigned Surgical Provider  Florencia Butler MD as Assigned Cancer Care Provider    The following health maintenance items are reviewed in Epic and correct as of today:  Health Maintenance   Topic Date Due    HF ACTION PLAN  Never done    ANNUAL REVIEW OF HM ORDERS  Never done    HEPATITIS A IMMUNIZATION (1 of 2 - Risk 2-dose series) Never done    HEPATITIS B IMMUNIZATION (1 of 3 - Risk 3-dose series) Never done    COLORECTAL CANCER SCREENING  03/22/2022    INFLUENZA VACCINE (1) 09/01/2024    COVID-19 Vaccine (4 - 2024-25 season) 09/01/2024    EYE EXAM  02/13/2025    MEDICARE ANNUAL WELLNESS VISIT  04/24/2025    DIABETIC FOOT EXAM  04/24/2025    A1C  08/10/2025    BMP  08/10/2025    MICROALBUMIN  08/10/2025    CBC  01/27/2026    HEMOGLOBIN  01/27/2026    ALT  02/10/2026    LIPID  02/10/2026    CREATININE  02/10/2026    FALL RISK ASSESSMENT  02/14/2026    ADVANCE CARE PLANNING  04/24/2029    DTAP/TDAP/TD IMMUNIZATION (3 - Td or Tdap) 01/12/2031     "PARATHYROID  Completed    PHOSPHORUS  Completed    TSH W/FREE T4 REFLEX  Completed    PHQ-2 (once per calendar year)  Completed    Pneumococcal Vaccine: 50+ Years  Completed    URINALYSIS  Completed    ALK PHOS  Completed    ZOSTER IMMUNIZATION  Completed    RSV VACCINE  Completed    HPV IMMUNIZATION  Aged Out    MENINGITIS IMMUNIZATION  Aged Out       {ROS Picklists (Optional):623723}     Objective    Exam  /70   Pulse 78   Temp 98.5  F (36.9  C) (Temporal)   Resp 17   Ht 1.765 m (5' 9.5\")   Wt 87.8 kg (193 lb 9.6 oz)   SpO2 97%   BMI 28.18 kg/m     Estimated body mass index is 28.18 kg/m  as calculated from the following:    Height as of this encounter: 1.765 m (5' 9.5\").    Weight as of this encounter: 87.8 kg (193 lb 9.6 oz).    Physical Exam  {Exam Choices (Optional):258053}        2/14/2025   Mini Cog   Clock Draw Score 2 Normal   3 Item Recall 2 objects recalled   Mini Cog Total Score 4     {A Mini-Cog total score of 0-2 suggests the possibility of dementia, score of 3-5 suggests no dementia:655027}         Signed Electronically by: Andrew Elizalde MD  {Email feedback regarding this note to [email or web address]   :973944}  "

## 2025-02-14 NOTE — PATIENT INSTRUCTIONS
Approval given to proceed with proposed procedure, without further diagnostic evaluation.  No solid food after midnight prior to your  procedure. May have clear liquids up to 5:30am   Stop  Eliquis  between now and  your  procedure to reduce risk of bleeding.   May use Tylenol for pain control  between then and your procedure  Take  Isosorbide, Levothyroxine with a small sip of water to swallow the medication when you first get up the  AM of the  procedure  before 5:30am   Take Lantus  20 units the night before surgery and 15 units  the AM of surgery  No Humalog the AM of surgery     After surgery, then change Lantus to 31 units in the morning and 28 units at bedtime  Add 1 unit of Humalog to your usual suppertime dosing  Labs as ordered today and nonfasting in  3 months for diabetes  Restart Eliquis and other usual medications after surgery as usual

## 2025-02-14 NOTE — PROGRESS NOTES
Preoperative Evaluation  [address] [address]  Phone: [phone]  Primary Provider: Andrew Elizalde MD  Pre-op Performing Provider: Andrew Elizalde MD  Feb 14, 2025 2/16/2025   Surgical Information   What procedure is being done? Port removal       Facility or Hospital where procedure/surgery will be performed: [facility]   Who is doing the procedure / surgery? Dr Santiago   Date of surgery / procedure: 2/17/25   Time of surgery / procedure: 9:00am   Where do you plan to recover after surgery? at home alone       Multiple values from one day are sorted in reverse-chronological order     Fax number for surgical facility: Note does not need to be faxed, will be available electronically in Epic.    Assessment & Plan     The proposed surgical procedure is considered LOW risk.    Preop examination  Appears medically stable to proceed with chemotherapy port removal procedure as scheduled    Malignant poorly differentiated neuroendocrine carcinoma (H)  Status post chemoradiation treatment by oncology.  Recent PET scan shows remission.  Oncology note indicates patient may have port removed    Type 2 diabetes mellitus with stage 3a chronic kidney disease, with long-term current use of insulin (H)  Controlled with A1c 6.9 but need to improve bedtime blood sugar control.  See patient instructions regarding insulin use around the time of surgery and after surgery.  Recheck A1c 3 months  - Basic metabolic panel; Future  - Basic metabolic panel    Thrombocytopenia  Secondary to previous chemoradiation.  Platelet count 86 and improving. OK to proceed with procedure at current plat count  - CBC with platelets; Future  - CBC with platelets    Stage 3a chronic kidney disease (H)  GFR 56    Anemia, unspecified type  Secondary to previous chemoradiation.   Hgb 9.9 and improving.  Denies seeing blood in stools    Atrial fibrillation, unspecified  type (H)  Rate at goal.  On Eliquis.  Will hold prior to procedure as per plan instructions below    Coronary artery disease involving native coronary artery of native heart without angina pectoris  Using a foot exercise machine for up to 30 minutes without chest pain or shortness of breath.  Continue medical management with isosorbide and statin therapy    Hypothyroidism  On levothyroxine.  TSH at goal    BRANDON (obstructive sleep apnea)  Controlled with CPAP therapy.  Does not need to bring CPAP machine to hospital on day of procedure.  Monitor patient closely in perioperative time period for signs of apnea        Risks and Recommendations  The patient has the following additional risks and recommendations for perioperative complications:   - No identified additional risk factors other than previously addressed    Patient instructions   Approval given to proceed with proposed procedure, without further diagnostic evaluation.  No solid food after midnight prior to your  procedure. May have clear liquids up to 5:30am   Stop  Eliquis  between now and  your  procedure to reduce risk of bleeding.   May use Tylenol for pain control  between then and your procedure  Take  Isosorbide, Levothyroxine with a small sip of water to swallow the medication when you first get up the  AM of the  procedure  before 5:30am  Take Lantus  20 units the night before surgery and 15 units  the AM of surgery  No Humalog the AM of surgery  After surgery, then change Lantus to 31 units in the morning and 28 units at bedtime  Add 1 unit of Humalog to your usual suppertime dosing  Labs as ordered today and nonfasting in  3 months for diabetes  Restart Eliquis and other usual medications after surgery as usual  Call  [phone] or use [facility] to schedule a future lab appointment  non-fasting in 3 months to recheck diabetes status.     Recommendation  Approval given to proceed with proposed procedure, without further diagnostic  evaluation.            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  Was treated with chemoradiation.  Most recent PET scan showed complete response/remission of the cancer.  Following consultation with oncology, patient longer requires his chemotherapy port for further treatment and presents for clearance to undergo removal of the port.  Regarding current exercise tolerance, patient  using a leg pumping exercise machine for up to 30 minutes without chest pain or shortness of breath          2/16/2025   Pre-Op Questionnaire   Have you ever had a heart attack or stroke? No   Have you ever had surgery on your heart or blood vessels, such as a stent placement, a coronary artery bypass, or surgery on an artery in your head, neck, heart, or legs? (!) YES CABG 1997   Do you have chest pain with activity? No   Do you have a history of heart failure? (!) YES echo of the heart 2021 showed ejection fraction 60% but decreased RV function and severe pulmonary hypertension with cor pulmonale.  Now resolved.  Most recent echo 2023 showed normal RV function and continued normal LV function   Do you have a cough, shortness of breath, or wheezing? No   Do you or anyone in your family have previous history of blood clots? No   Do you or does anyone in your family have a serious bleeding problem such as prolonged bleeding following surgeries or cuts? No   Have you ever had problems with anemia or been told to take iron pills? (!) YES chronic anemia and thrombocytopenia with cancer treatment   Have you had any abnormal blood loss such as black, tarry or bloody stools? No   Have you ever had a blood transfusion? (!) UNKNOWN   Are you willing to have a blood transfusion if it is  medically needed before, during, or after your surgery? Yes   Have you or any of your relatives ever had problems with anesthesia? No   Do you have sleep apnea, excessive snoring or daytime drowsiness? (!) YES   Do you have a CPAP machine? Yes   Do you have any artifical heart valves or other implanted medical devices like a pacemaker, defibrillator, or continuous glucose monitor? No   Do you have artificial joints? No   Are you allergic to latex? No     Health Care Directive  Patient does not have a Health Care Directive: Discussed advance care planning with patient; information given to patient to review.    Preoperative Review of    reviewed - no record of controlled substances prescribed.      Status of Chronic Conditions:  See assessment/plan section above for active medical problems.  Problems all longstanding and stable, except as noted/documented.  See ROS in addition for pertinent symptoms related to these conditions.      Patient Active Problem List    Diagnosis Date Noted    Other dietary vitamin B12 deficiency anemia 12/10/2024     Priority: Medium    Malignant poorly differentiated neuroendocrine carcinoma (H) 07/23/2024     Priority: Medium    High grade neuroendocrine carcinoma (H) 07/19/2024     Priority: Medium    Steatosis of liver 04/15/2023     Priority: Medium    Gastroparesis 06/08/2022     Priority: Medium    Atrial fibrillation, unspecified type (H) 05/02/2018     Priority: Medium    History of colonic polyps 03/24/2017     Priority: Medium    Hypothyroidism, unspecified type 08/15/2016     Priority: Medium    Gastroesophageal reflux disease without esophagitis 04/27/2016     Priority: Medium    BRANDON (obstructive sleep apnea) 12/07/2012     Priority: Medium    Vitamin D deficiency 09/03/2011     Priority: Medium    Osteopenia      Priority: Medium    Hyperlipidemia LDL goal <70 05/26/2011     Priority: Medium    Testosterone deficiency 12/08/2009     Priority: Medium    Anemia 12/24/2008      Priority: Medium    Allergic rhinitis 01/07/2008     Priority: Medium     Problem list name updated by automated process. Provider to review      Coronary atherosclerosis      Priority: Medium     Nuc Stress 4/15/16: On both stress and rest images there is a very small size mild intensity apical photopenic defect which is of equal extent and intensity on both stress and rest images. Gated images demonstrated no regional wall motion abnormalities. The left ventricular ejection fraction was calculated to be 61% with stress.      Impotence of organic origin      Priority: Medium    Essential hypertension, benign 01/03/2003     Priority: Medium      Past Medical History:   Diagnosis Date    Adhesive capsulitis of shoulder     Allergic rhinitis 01/07/2008    Problem list name updated by automated process. Provider to review      Anemia 03/10/2014    Anemia, unspecified     Antiplatelet or antithrombotic long-term use     Arrhythmia     Atrial fib/flutter    Atrial fibrillation, unspecified type (H) 05/02/2018    CAD (coronary artery disease)     Chemotherapy-induced neutropenia 07/19/2024    Coronary artery disease involving native coronary artery of native heart without angina pectoris 02/16/2025    Coronary atherosclerosis     Nuc Stress 4/15/16: On both stress and rest images there is a very small size mild intensity apical photopenic defect which is of equal extent and intensity on both stress and rest images. Gated images demonstrated no regional wall motion abnormalities. The left ventricular ejection fraction was calculated to be 61% with stress.      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    History of cardioversion 04/24/2018    a single synchronized shock of 120 joules restored normal sinus rhythm    History of cardioversion  02/13/2019    single shock , 200 joules successul in restoring NSR    History of colonic polyps 03/24/2017    Hyperlipidemia LDL goal <70 05/26/2011    Hypertension     Hypothyroidism     Hypothyroidism, unspecified type 08/15/2016    Impotence of organic origin     Laceration of finger 06/2010     left thumb s/p sutures    Numbness and tingling     diabetic neuropathy bilateral feet    BRANDON (obstructive sleep apnea)     intolerant of CPAP, uses it occasionally.  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 disease, with long-term current use of insulin (H) 02/16/2025    Type 2 diabetes mellitus without complication  (goal A1C<8) 10/24/2015    Typical atrial flutter (H) 04/18/2016    Unspecified hypothyroidism     Vitamin D deficiency 09/03/2011     Past Surgical History:   Procedure Laterality Date    ANESTHESIA CARDIOVERSION N/A 02/13/2019    Procedure: ANESTHESIA CARDIOVERSION;  Surgeon: GENERIC ANESTHESIA PROVIDER;  Location:  OR    ANESTHESIA CARDIOVERSION N/A 05/22/2019    Procedure: ANESTHESIA, FOR CARDIOVERSION;  Surgeon: GENERIC ANESTHESIA PROVIDER;  Location:  OR    CARDIAC SURGERY      bypass in 1997    CARDIOVERSION  04/24/2018    COLONOSCOPY      CORONARY ANGIOGRAPHY ADULT ORDER      4/2/2012    CORONARY ARTERY BYPASS  1997    [facility]    ENDOSCOPIC ULTRASOUND UPPER GASTROINTESTINAL TRACT (GI) N/A 03/14/2019    Procedure: ENDOSCOPIC ULTRASOUND OF UPPER GASTROINTESTINAL TRACT;  Surgeon: Ju Torres MD;  Location:  OR    ESOPHAGOSCOPY, GASTROSCOPY, DUODENOSCOPY (EGD),  COMBINED N/A 4/15/2022    Procedure: ESOPHAGOGASTRODUODENOSCOPY (EGD);  Surgeon: Erik Baca DO;  Location:  GI    EXCISE MASS HEAD Left 08/18/2016    Procedure: EXCISE MASS HEAD;  Surgeon: Ivan Hernandez MD;  Location: [facility]    HEART CATH, ANGIOPLASTY  04/2012    IR CHEST PORT PLACEMENT > 5 YRS OF AGE  8/9/2024    LAPAROSCOPIC CHOLECYSTECTOMY N/A 03/17/2019    Procedure: LAPAROSCOPIC CHOLECYSTECTOMY;  Surgeon: Janel Paz MD;  Location:  OR    PHACOEMULSIFICATION CLEAR CORNEA WITH STANDARD INTRAOCULAR LENS IMPLANT Left 06/08/2015    Procedure: PHACOEMULSIFICATION CLEAR CORNEA WITH STANDARD INTRAOCULAR LENS IMPLANT;  Surgeon: Nixon Farnsworth MD;  Location:  [facility]    PHACOEMULSIFICATION CLEAR CORNEA WITH STANDARD INTRAOCULAR LENS IMPLANT Right 06/22/2015    Procedure: PHACOEMULSIFICATION CLEAR CORNEA WITH STANDARD INTRAOCULAR LENS IMPLANT;  Surgeon: Nixon Farnsworth MD;  Location:  [facility]    SEPTOPLASTY, TURBINOPLASTY, COMBINED Bilateral 08/18/2016    Procedure: COMBINED SEPTOPLASTY, TURBINOPLASTY;  Surgeon: Ivan Hernandez MD;  Location: [facility] NONSPECIFIC PROCEDURE  1997    CABG (Denominational)    [facility] NONSPECIFIC PROCEDURE  08/2001    stress echo     Current Outpatient Medications   Medication Sig Dispense Refill    ELIQUIS ANTICOAGULANT 5 MG tablet Take 1 tablet by mouth 2 times daily.      glucose-vitamin C (DEX4 GLUCOSE) 4-6 GM-MG CHEW Take 1 tablet by mouth every hour as needed for low blood sugar.      atorvastatin (LIPITOR) 40 MG tablet TAKE 1 TABLET (40 MG) BY MOUTH DAILY 90 tablet 2    azelastine 137 MCG/SPRAY SOLN [place] 2 sprays into both nostrils 2 times daily. (Patient taking differently: Spray 2 sprays into both nostrils 2 times daily as needed (runny nose).) 30 mL 1    blood glucose (ACCU-CHEK NORMAN PLUS) test strip USE TO TEST BLOOD SUGAR 3 TIMES A DAY OR AS DIRECTED 100 strip 2    blood glucose calibration (ACCU-CHEK NORMAN) solution USE AS DIRECTED 1 each 0     Continuous Blood Gluc  (FREESTYLE DELFINA 2 READER) CHRYSTAL 1 Device continuous 1 each 1    Continuous Blood Gluc Sensor (FREESTYLE DELFINA 2 SENSOR) [facility] APPLY 1 SENSOR AND CHANGE EVERY 14 DAYS AS DIRECTED 6 each 3    cyanocobalamin (VITAMIN B-12) 1000 MCG tablet Take 1 tablet (1,000 mcg) by mouth daily.      fenofibrate micronized (LOFIBRA) 67 MG capsule TAKE 1 CAPSULE BY MOUTH EVERY MORNING BEFORE BREAKFAST 90 capsule 3    glucose 4 g CHEW Take 1 tablet by mouth every hour as needed for low blood sugar       Insulin Aspart FlexPen 100 UNIT/ML SOPN Inject 6-10 Units Subcutaneous 3 times daily (with meals) Plus priming (Patient taking differently: Inject subcutaneously 3 times daily (with meals). Plus priming)      insulin glargine (LANTUS SOLOSTAR) 100 UNIT/ML pen INJECT 31 UNITS UNDER THE SKIN EVERY 12 HOURS (Patient taking differently: Inject 30 Units subcutaneously 2 times daily.) 30 mL 4    insulin pen needle (GLOBAL EASE INJECT PEN NEEDLES) 31G X 5 MM miscellaneous USE 1 PENTIP AS DIRECTED 4 TIMES A  each 3    isosorbide mononitrate (IMDUR) 30 MG 24 hr tablet Take 1 tablet (30 mg) by mouth daily 90 tablet 3    levothyroxine (SYNTHROID/LEVOTHROID) 125 MCG tablet Take 1 tablet (125 mcg) by mouth daily 90 tablet 3    nitroGLYcerin (NITROSTAT) 0.4 MG sublingual tablet Place 1 tablet (0.4 mg) under the tongue every 5 minutes as needed for chest pain 25 tablet 3       Allergies   Allergen Reactions    Omeprazole      diarrhea    Pantoprazole      diarrhea        Social History     Tobacco Use    Smoking status: Former     Current packs/day: 0.00     Average packs/day: 1 pack/day for 6.5 years (6.5 ttl pk-yrs)     Types: Cigarettes, Cigars, Pipe     Start date:      Quit date: 1964     Years since quittin.6    Smokeless tobacco: Never    Tobacco comments:     10/16/24 Pt states he will an occasional cigar.    Substance Use Topics    Alcohol use: Not Currently     Comment: couple drinks a year      Family History   Problem Relation Age of Onset    Genitourinary Problems Father         d: age 89; ARF    Hypertension Father     Diabetes Mother         d: age 68, CAD    Heart Disease Mother         MI    Myocardial Infarction Mother     Cardiovascular Brother         d:  age 31; CAD    Heart Disease Brother         age 31, MI    Diabetes Sister         b:  1939; DM2    Diabetes Sister      History   Drug Use No             Review of Systems  CONSTITUTIONAL: NEGATIVE for fever, chills.  Weight down 19 pounds since September 2024  INTEGUMENTARY/SKIN: NEGATIVE for worrisome rashes, moles or lesions  EYES: NEGATIVE for vision changes or irritation  ENT/MOUTH: NEGATIVE for ear,  swallowing issues now.  Rare pain in throat but denies any currently.  See HPI  RESP: NEGATIVE for significant cough or SOB  CV: NEGATIVE for chest pain, palpitations. Has minimal LE ankle edema without orthopnea, PND.   GI: NEGATIVE for nausea, abdominal pain, heartburn, or change in bowel habits  : NEGATIVE for frequency, dysuria, or hematuria  MUSCULOSKELETAL: NEGATIVE for significant arthralgias or myalgia yet limited activity  NEURO: NEGATIVE for weakness, dizziness.  Has some numbness in bilateral lower extremity distally. Ambulating with a cane  ENDOCRINE: NEGATIVE for temperature intolerance. POSITIVE for DM. Using CGM. See sugar averages below  HEME: NEGATIVE for bleeding problems  PSYCHIATRIC: NEGATIVE for changes in mood or affect    Patient is using a freestyle harry 2 continuous glucose monitor for blood sugar monitoring.   Using  insulin 3 or more times  per day.  Because of the patient's diabetic history (Type 2), the  patient requires   adjustments in fast acting insulin and/or sometimes long-acting insulin dosing based both on the quantity of carbohydrates consumed and the blood sugar results noted with the CGM.  Most recent blood sugars as below:     CGM  Average blood sugar for the 4 quartiles of the day  (mn-6am,  "6am-noon, noon-6pm, 6pm-mn) as below:  7 day: 117, 1.7, 153, 197  14 day: 120, 144, 164, 210  30 day: 129, 152, 189, 230  90 day: 129, 151, 190, 218             Objective    /70   Pulse 78   Temp 98.5  F (36.9  C) (Temporal)   Resp 17   Ht 1.765 m (5' 9.5\")   Wt 87.8 kg (193 lb 9.6 oz)   SpO2 97%   BMI 28.18 kg/m     Estimated body mass index is 28.18 kg/m  as calculated from the following:    Height as of this encounter: 1.765 m (5' 9.5\").    Weight as of this encounter: 87.8 kg (193 lb 9.6 oz).  Physical Exam  Eye: PERRL, EOMI  HENT: ear canals and TM's normal and nose and mouth without ulcers or lesions   Neck: no adenopathy. Thyroid normal to palpation. No bruits  Pulm: Lungs clear to auscultation   CV: Irregular rates and rhythm  GI: Soft, nontender, Normal active bowel sounds, No hepatosplenomegaly or masses palpable  Ext: Peripheral pulses intact. Minimal bilateral ankle edema.  Neuro: Normal strength and tone, sensory exam reduced to light touch sensation feet bilaterally      Recent Labs   Lab Test 02/10/25  0836 01/27/25  0846 01/02/25  0927 08/09/24  1037 07/16/24  0848   HGB  --  9.7* 8.8*   < > 11.0*   PLT  --  85* 61*   < >  --      --  143   < > 140   POTASSIUM 4.8  --  3.8   < > 4.4   CR 1.24*  --  1.02   < > 1.48*   A1C 6.9*  --   --   --  7.9*    < > = values in this interval not displayed.        Diagnostics  Component      Latest Ref Rng 1/27/2025  8:46 AM 2/10/2025  8:36 AM 2/10/2025  8:38 AM 2/14/2025  2:30 PM   WBC      4.0 - 11.0 10e3/uL 3.0 (L)    3.8 (L)    RBC Count      4.40 - 5.90 10e6/uL 3.14 (L)    3.20 (L)    Hemoglobin      13.3 - 17.7 g/dL 9.7 (L)    9.9 (L)    Hematocrit      40.0 - 53.0 % 29.5 (L)    30.3 (L)    MCV      78 - 100 fL 94    95    MCH      26.5 - 33.0 pg 30.9    30.9    MCHC      31.5 - 36.5 g/dL 32.9    32.7    RDW      10.0 - 15.0 % 15.8 (H)    15.7 (H)    Platelet Count      150 - 450 10e3/uL 85 (L)    86 (L)    % Neutrophils      % 62       % " Lymphocytes      % 19       % Monocytes      % 12       % Eosinophils      % 5       % Basophils      % 1       % Immature Granulocytes      % 1       NRBCs per 100 WBC      <1 /100 0       Absolute Neutrophils      1.6 - 8.3 10e3/uL 1.9       Absolute Lymphocytes      0.8 - 5.3 10e3/uL 0.6 (L)       Absolute Monocytes      0.0 - 1.3 10e3/uL 0.4       Absolute Eosinophils      0.0 - 0.7 10e3/uL 0.2       Absolute Basophils      0.0 - 0.2 10e3/uL 0.0       Absolute Immature Granulocytes      <=0.4 10e3/uL 0.0       Absolute NRBCs      10e3/uL 0.0       Sodium      135 - 145 mmol/L  141   140    Potassium      3.4 - 5.3 mmol/L  4.8   4.9    Carbon Dioxide (CO2)      22 - 29 mmol/L  23   23    Anion Gap      7 - 15 mmol/L  11   11    Urea Nitrogen      8.0 - 23.0 mg/dL  13.5   13.9    Creatinine      0.67 - 1.17 mg/dL  1.24 (H)   1.28 (H)    GFR Estimate      >60 mL/min/1.73m2  58 (L)   56 (L)    Calcium      8.8 - 10.4 mg/dL  10.0   10.2    Chloride      98 - 107 mmol/L  107   106    Glucose      70 - 99 mg/dL  184 (H)   216 (H)    Alkaline Phosphatase      40 - 150 U/L  60      AST      0 - 45 U/L  25      ALT      0 - 70 U/L  16      Protein Total      6.4 - 8.3 g/dL  6.7      Albumin      3.5 - 5.2 g/dL  4.3      Bilirubin Total      <=1.2 mg/dL  0.6      Patient Fasting?  Yes      Patient Fasting?  Yes      Cholesterol      <200 mg/dL  110      Triglycerides      <150 mg/dL  127      HDL Cholesterol      >=40 mg/dL  42      LDL Cholesterol Calculated      <100 mg/dL  43      Non HDL Cholesterol      <130 mg/dL  68      Creatinine Urine      mg/dL   124.0     Albumin Urine mg/L      mg/L   353.0     Albumin Urine mg/g Cr      0.00 - 17.00 mg/g Cr   284.68 (H)     Estimated Average Glucose      <117 mg/dL  151 (H)      Hemoglobin A1C      0.0 - 5.6 %  6.9 (H)      TSH      0.30 - 4.20 uIU/mL  1.81            No EKG required for low risk surgery     Revised Cardiac Risk Index (RCRI)  The patient has the following  serious cardiovascular risks for perioperative complications:   - Coronary Artery Disease (MI, positive stress test, angina, Qs on EKG) = 1 point   - Diabetes Mellitus (on Insulin) = 1 point     RCRI Interpretation: 2 points: Class III (moderate risk - 6.6% complication rate)     Estimated Functional Capacity: Performs 4 METS exercise without symptoms (e.g., light housework, stairs, 4 mph walk, 7 mph bike, slow step dance)  Patient using a leg pumping exercise machine for up to 30 minutes without chest pain or shortness of breath         Signed Electronically by: Andrew Elizalde MD  A copy of this evaluation report is provided to the requesting physician.

## 2025-02-15 LAB
ANION GAP SERPL CALCULATED.3IONS-SCNC: 11 MMOL/L (ref 7–15)
BUN SERPL-MCNC: 13.9 MG/DL (ref 8–23)
CALCIUM SERPL-MCNC: 10.2 MG/DL (ref 8.8–10.4)
CHLORIDE SERPL-SCNC: 106 MMOL/L (ref 98–107)
CREAT SERPL-MCNC: 1.28 MG/DL (ref 0.67–1.17)
EGFRCR SERPLBLD CKD-EPI 2021: 56 ML/MIN/1.73M2
GLUCOSE SERPL-MCNC: 216 MG/DL (ref 70–99)
HCO3 SERPL-SCNC: 23 MMOL/L (ref 22–29)
POTASSIUM SERPL-SCNC: 4.9 MMOL/L (ref 3.4–5.3)
SODIUM SERPL-SCNC: 140 MMOL/L (ref 135–145)

## 2025-02-16 PROBLEM — N18.31 STAGE 3A CHRONIC KIDNEY DISEASE (H): Status: ACTIVE | Noted: 2025-02-16

## 2025-02-16 PROBLEM — R74.01 ALT (SGPT) LEVEL RAISED: Status: RESOLVED | Noted: 2025-02-14 | Resolved: 2025-02-16

## 2025-02-16 PROBLEM — D12.2 BENIGN NEOPLASM OF ASCENDING COLON: Status: RESOLVED | Noted: 2017-03-24 | Resolved: 2025-02-16

## 2025-02-16 PROBLEM — D69.6 THROMBOCYTOPENIA: Status: ACTIVE | Noted: 2025-02-16

## 2025-02-16 PROBLEM — N18.31 TYPE 2 DIABETES MELLITUS WITH STAGE 3A CHRONIC KIDNEY DISEASE, WITH LONG-TERM CURRENT USE OF INSULIN (H): Status: ACTIVE | Noted: 2025-02-16

## 2025-02-16 PROBLEM — M62.81 GENERALIZED MUSCLE WEAKNESS: Status: RESOLVED | Noted: 2024-10-30 | Resolved: 2025-02-16

## 2025-02-16 PROBLEM — R10.10 UPPER ABDOMINAL PAIN: Status: RESOLVED | Noted: 2025-02-14 | Resolved: 2025-02-16

## 2025-02-16 PROBLEM — T45.1X5A CHEMOTHERAPY-INDUCED NEUTROPENIA: Status: RESOLVED | Noted: 2024-07-19 | Resolved: 2025-02-16

## 2025-02-16 PROBLEM — D70.1 CHEMOTHERAPY-INDUCED NEUTROPENIA: Status: RESOLVED | Noted: 2024-07-19 | Resolved: 2025-02-16

## 2025-02-16 PROBLEM — K30 DELAYED GASTRIC EMPTYING: Status: RESOLVED | Noted: 2025-02-14 | Resolved: 2025-02-16

## 2025-02-16 PROBLEM — K64.9 HEMORRHOIDS: Status: RESOLVED | Noted: 2017-03-22 | Resolved: 2025-02-16

## 2025-02-16 PROBLEM — R18.8 ASCITES: Status: RESOLVED | Noted: 2025-02-14 | Resolved: 2025-02-16

## 2025-02-16 PROBLEM — K59.00 CONSTIPATION: Status: RESOLVED | Noted: 2025-02-14 | Resolved: 2025-02-16

## 2025-02-16 PROBLEM — Z79.4 TYPE 2 DIABETES MELLITUS WITH STAGE 3A CHRONIC KIDNEY DISEASE, WITH LONG-TERM CURRENT USE OF INSULIN (H): Status: ACTIVE | Noted: 2025-02-16

## 2025-02-16 PROBLEM — R12 HEARTBURN: Status: RESOLVED | Noted: 2025-02-14 | Resolved: 2025-02-16

## 2025-02-16 PROBLEM — D12.3 BENIGN NEOPLASM OF TRANSVERSE COLON: Status: RESOLVED | Noted: 2017-03-24 | Resolved: 2025-02-16

## 2025-02-16 PROBLEM — K74.69 OTHER CIRRHOSIS OF LIVER (H): Status: RESOLVED | Noted: 2022-04-14 | Resolved: 2025-02-16

## 2025-02-16 PROBLEM — R53.1 GENERALIZED WEAKNESS: Status: RESOLVED | Noted: 2024-10-30 | Resolved: 2025-02-16

## 2025-02-16 PROBLEM — I25.10 CORONARY ARTERY DISEASE INVOLVING NATIVE CORONARY ARTERY OF NATIVE HEART WITHOUT ANGINA PECTORIS: Status: ACTIVE | Noted: 2025-02-16

## 2025-02-16 PROBLEM — E11.22 TYPE 2 DIABETES MELLITUS WITH STAGE 3A CHRONIC KIDNEY DISEASE, WITH LONG-TERM CURRENT USE OF INSULIN (H): Status: ACTIVE | Noted: 2025-02-16

## 2025-02-17 ENCOUNTER — HOSPITAL ENCOUNTER (OUTPATIENT)
Facility: CLINIC | Age: 85
Discharge: HOME | End: 2025-02-17
Admitting: RADIOLOGY
Payer: COMMERCIAL

## 2025-02-17 ENCOUNTER — APPOINTMENT (OUTPATIENT)
Dept: INTERVENTIONAL RADIOLOGY/VASCULAR | Facility: CLINIC | Age: 85
End: 2025-02-17
Attending: INTERNAL MEDICINE
Payer: COMMERCIAL

## 2025-02-17 VITALS
BODY MASS INDEX: 27.2 KG/M2 | TEMPERATURE: 98.2 F | OXYGEN SATURATION: 95 % | DIASTOLIC BLOOD PRESSURE: 49 MMHG | HEIGHT: 70 IN | SYSTOLIC BLOOD PRESSURE: 104 MMHG | WEIGHT: 190 LBS | HEART RATE: 60 BPM | RESPIRATION RATE: 16 BRPM

## 2025-02-17 DIAGNOSIS — C7A.1 HIGH GRADE NEUROENDOCRINE CARCINOMA (H): ICD-10-CM

## 2025-02-17 PROCEDURE — 36590 REMOVAL TUNNELED CV CATH: CPT

## 2025-02-17 PROCEDURE — 250N000011 HC RX IP 250 OP 636: Performed by: PHYSICIAN ASSISTANT

## 2025-02-17 PROCEDURE — 250N000011 HC RX IP 250 OP 636: Performed by: RADIOLOGY

## 2025-02-17 PROCEDURE — 999N000163 HC STATISTIC SIMPLE TUBE INSERTION/CHARGE, PORT, CATH, FISTULOGRAM

## 2025-02-17 RX ORDER — NALOXONE HYDROCHLORIDE 0.4 MG/ML
0.2 INJECTION, SOLUTION INTRAMUSCULAR; INTRAVENOUS; SUBCUTANEOUS
Status: DISCONTINUED | OUTPATIENT
Start: 2025-02-17 | End: 2025-02-17 | Stop reason: HOSPADM

## 2025-02-17 RX ORDER — ACETAMINOPHEN 325 MG/1
650 TABLET ORAL
Status: DISCONTINUED | OUTPATIENT
Start: 2025-02-17 | End: 2025-02-17 | Stop reason: HOSPADM

## 2025-02-17 RX ORDER — NALOXONE HYDROCHLORIDE 0.4 MG/ML
0.4 INJECTION, SOLUTION INTRAMUSCULAR; INTRAVENOUS; SUBCUTANEOUS
Status: DISCONTINUED | OUTPATIENT
Start: 2025-02-17 | End: 2025-02-17 | Stop reason: HOSPADM

## 2025-02-17 RX ORDER — FLUMAZENIL 0.1 MG/ML
0.2 INJECTION, SOLUTION INTRAVENOUS
Status: DISCONTINUED | OUTPATIENT
Start: 2025-02-17 | End: 2025-02-17 | Stop reason: HOSPADM

## 2025-02-17 RX ORDER — FENTANYL CITRATE 50 UG/ML
25-50 INJECTION, SOLUTION INTRAMUSCULAR; INTRAVENOUS EVERY 5 MIN PRN
Status: DISCONTINUED | OUTPATIENT
Start: 2025-02-17 | End: 2025-02-17 | Stop reason: HOSPADM

## 2025-02-17 RX ORDER — ONDANSETRON 2 MG/ML
4 INJECTION INTRAMUSCULAR; INTRAVENOUS
Status: DISCONTINUED | OUTPATIENT
Start: 2025-02-17 | End: 2025-02-17 | Stop reason: HOSPADM

## 2025-02-17 RX ADMIN — MIDAZOLAM 1 MG: 1 INJECTION INTRAMUSCULAR; INTRAVENOUS at 08:58

## 2025-02-17 RX ADMIN — LIDOCAINE HYDROCHLORIDE 10 ML: 10; .005 INJECTION, SOLUTION EPIDURAL; INFILTRATION; INTRACAUDAL; PERINEURAL at 09:00

## 2025-02-17 RX ADMIN — FENTANYL CITRATE 50 MCG: 50 INJECTION, SOLUTION INTRAMUSCULAR; INTRAVENOUS at 08:58

## 2025-02-17 ASSESSMENT — ACTIVITIES OF DAILY LIVING (ADL)
ADLS_ACUITY_SCORE: 57

## 2025-02-17 NOTE — PROGRESS NOTES
Care Suites Admission Nursing Note    Patient Information  Name: Pascual Perea  Age: 84 year old  Reason for admission: chest port removal  Care Suites arrival time: 0745    Visitor Information  Name: Harpreet     Patient Admission/Assessment   Pre-procedure assessment complete: Yes  If abnormal assessment/labs, provider notified: N/A  NPO: Yes  Medications held per instructions/orders: Yes  Consent: obtained  If applicable, pregnancy test status: deferred  Patient oriented to room: Yes  Education/questions answered: Yes  Plan/other: proceed with procedure as planned    Discharge Planning  Discharge name/phone number: Harpreet   Overnight post sedation caregiver: Harpreet  Discharge location: home    Jayashree Gibson RN

## 2025-02-17 NOTE — PRE-PROCEDURE
GENERAL PRE-PROCEDURE:   Procedure:  Right chest port removal with moderate sedation  Date/Time:  2/17/2025 8:15 AM    Written consent obtained?: Yes    Risks and benefits: Risks, benefits and alternatives were discussed    Consent given by:  Patient  Patient states understanding of procedure being performed: Yes    Patient's understanding of procedure matches consent: Yes    Procedure consent matches procedure scheduled: Yes    Expected level of sedation:  Moderate  Appropriately NPO:  Yes  ASA Class:  3  Mallampati  :  Grade 3- soft palate visible, posterior pharyngeal wall not visible  Lungs:  Lungs clear with good breath sounds bilaterally  Heart:  Normal heart sounds and rate  History & Physical reviewed:  History and physical reviewed and no updates needed  Statement of review:  I have reviewed the lab findings, diagnostic data, medications, and the plan for sedation

## 2025-02-17 NOTE — IR NOTE
Patient Name: Pascual Perea  Medical Record Number: [id number]  Today's Date: 2/17/2025    Procedure: Port Removal  Proceduralist: Dr. Santiago  Pathology present: no    Procedure Start: 0857  Procedure end: 0911  Sedation medications administered: Last Dose: 0858, Fentanyl 50 mcg, Versed 1 mg, Lidocaine w\epi 10 ml's.    Report given to: THANG Blanc RN  : no    Other Notes: Pt will require 1 bedrest post procedure. Pt arrived to IR room 1 from cs 12. Consent reviewed. Pt denies any questions or concerns regarding procedure. Pt positioned supine and monitored per protocol. Pt tolerated procedure without any noted complications.

## 2025-02-17 NOTE — DISCHARGE INSTRUCTIONS
Port Removal Discharge Instructions     After you go home:    Have an adult stay with you for the first 6 hours  You may resume your normal diet       For 24 hours - due to the sedation you received:  Relax and take it easy  Do NOT make any important or legal decisions  Do NOT drive or operate machines at home or at work  Do NOT drink alcohol    Care of Puncture Site:    Keep the dressings on your sites clean & dry for 24-48 hours. Change it only if it gets wet or dirty.  You may take a shower 24-48 hours after your procedure. Do not scrub site.  No submerging site for 2 weeks or until the incision is completely healed.  Do not remove the small white strips of tape, if present. Allow them to fall off on their own.   You may cover the wound with a bandaid if needed for comfort.      Activity     Avoid heavy lifting (greater than 10 pounds) or the overuse of your shoulder for 48-72 hours.    Bleeding:    If you start bleeding from the incision site in your chest or have swelling in your neck, sit down and press on the site for 5-10 minutes.   If bleeding has not stopped after 10 minutes, call your provider.        Call 911 right away if you have heavy bleeding or bleeding that does not stop.      Medicines:    You may resume all medications  Resume your Warfarin/Coumadin at your regular dose today. Follow up with your provider to have your INR rechecked  Resume your Platelet Inhibitors and Aspirin tomorrow at your regular dose  For minor pain, you may take Acetaminophen (Tylenol) or Ibuprofen (Advil)          Call the provider who ordered this procedure if:    You have swelling in your neck or over your port site  The incision area is red, swollen, hot or tender  You have chills or a fever greater than 101 F (38 C)  Any questions or concerns    Call  911 or go to the Emergency Room if you have:    Severe chest pain or trouble breathing  Bleeding that you cannot control    If you have questions call:         Interventional Radiology [facility] @ [phone]    Mon - Fri  7:30 am - 4 pm          Calls will be returned on the next business day  If you need immediate assistance - please be seen   in an Urgent Care or Emergency Department    You may also contact your provider via My Chart

## 2025-02-17 NOTE — PROGRESS NOTES
Care Suites Post Procedure Note    Patient Information  Name: Pascual Perea  Age: 84 year old    Post Procedure  Time patient returned to Care Suites: 0920  Concerns/abnormal assessment: None  If abnormal assessment, provider notified: N/A  Plan/Other: proceed with recovery and discharge as planned    Jayashree Gibson RN

## 2025-02-17 NOTE — IR NOTE
Procedure Note for IR Procedure Dictation  Conscious sedation: 1 mg IVP Versed, 50 mcg IVP fentanyl  Sedation time: 14 minutes  Fluoro time: 0 minutes  Total Fluoro Dose: 0 mGy (Air Kerma)  Contrast: 0 ml   Local anesthetic: 10 ml 1% lidocaine w/ Epi

## 2025-02-17 NOTE — PROGRESS NOTES
Care Suites Discharge Nursing Note    Patient Information  Name: Pascual Perea  Age: 84 year old    Discharge Education:  Discharge instructions reviewed: Yes  Additional education/resources provided: None  Patient/patient representative verbalizes understanding: Yes  Patient discharging on new medications: No  Medication education completed: N/A    Discharge Plans:   Discharge location: home  Discharge ride contacted: Yes  Approximate discharge time: 1015    Discharge Criteria:  Discharge criteria met and vital signs stable: Yes    Patient Belongs:  Patient belongings returned to patient: Yes    Jayashree Gibson RN

## 2025-02-27 DIAGNOSIS — Z79.4 TYPE 2 DIABETES MELLITUS WITHOUT COMPLICATION, WITH LONG-TERM CURRENT USE OF INSULIN (H): ICD-10-CM

## 2025-02-27 DIAGNOSIS — E11.9 TYPE 2 DIABETES MELLITUS WITHOUT COMPLICATION, WITH LONG-TERM CURRENT USE OF INSULIN (H): ICD-10-CM

## 2025-03-04 DIAGNOSIS — E78.5 HYPERLIPIDEMIA LDL GOAL <70: ICD-10-CM

## 2025-03-04 RX ORDER — ATORVASTATIN CALCIUM 40 MG/1
40 TABLET, FILM COATED ORAL DAILY
Qty: 90 TABLET | Refills: 2 | Status: SHIPPED | OUTPATIENT
Start: 2025-03-04

## 2025-03-17 DIAGNOSIS — Z79.4 TYPE 2 DIABETES MELLITUS WITH STAGE 3B CHRONIC KIDNEY DISEASE, WITH LONG-TERM CURRENT USE OF INSULIN (H): ICD-10-CM

## 2025-03-17 DIAGNOSIS — E11.22 TYPE 2 DIABETES MELLITUS WITH STAGE 3B CHRONIC KIDNEY DISEASE, WITH LONG-TERM CURRENT USE OF INSULIN (H): ICD-10-CM

## 2025-03-17 DIAGNOSIS — N18.32 TYPE 2 DIABETES MELLITUS WITH STAGE 3B CHRONIC KIDNEY DISEASE, WITH LONG-TERM CURRENT USE OF INSULIN (H): ICD-10-CM

## 2025-03-18 RX ORDER — INSULIN ASPART 100 [IU]/ML
INJECTION, SOLUTION INTRAVENOUS; SUBCUTANEOUS
Qty: 15 ML | Refills: 4 | Status: SHIPPED | OUTPATIENT
Start: 2025-03-18

## 2025-03-25 ENCOUNTER — PATIENT OUTREACH (OUTPATIENT)
Dept: CARE COORDINATION | Facility: CLINIC | Age: 85
End: 2025-03-25
Payer: COMMERCIAL

## 2025-03-25 DIAGNOSIS — Z79.4 TYPE 2 DIABETES MELLITUS WITHOUT COMPLICATION, WITH LONG-TERM CURRENT USE OF INSULIN (H): ICD-10-CM

## 2025-03-25 DIAGNOSIS — Z79.4 TYPE 2 DIABETES MELLITUS WITH STAGE 3B CHRONIC KIDNEY DISEASE, WITH LONG-TERM CURRENT USE OF INSULIN (H): ICD-10-CM

## 2025-03-25 DIAGNOSIS — N18.32 TYPE 2 DIABETES MELLITUS WITH STAGE 3B CHRONIC KIDNEY DISEASE, WITH LONG-TERM CURRENT USE OF INSULIN (H): ICD-10-CM

## 2025-03-25 DIAGNOSIS — E11.22 TYPE 2 DIABETES MELLITUS WITH STAGE 3B CHRONIC KIDNEY DISEASE, WITH LONG-TERM CURRENT USE OF INSULIN (H): ICD-10-CM

## 2025-03-25 DIAGNOSIS — E11.9 TYPE 2 DIABETES MELLITUS WITHOUT COMPLICATION, WITH LONG-TERM CURRENT USE OF INSULIN (H): ICD-10-CM

## 2025-03-26 RX ORDER — INSULIN GLARGINE 100 [IU]/ML
30 INJECTION, SOLUTION SUBCUTANEOUS 2 TIMES DAILY
Qty: 30 ML | Refills: 3 | Status: SHIPPED | OUTPATIENT
Start: 2025-03-26

## 2025-03-26 RX ORDER — PEN NEEDLE, DIABETIC 31 GX3/16"
NEEDLE, DISPOSABLE MISCELLANEOUS
Qty: 500 EACH | Refills: 3 | Status: SHIPPED | OUTPATIENT
Start: 2025-03-26

## 2025-04-08 ENCOUNTER — PATIENT OUTREACH (OUTPATIENT)
Dept: CARE COORDINATION | Facility: CLINIC | Age: 85
End: 2025-04-08
Payer: COMMERCIAL

## 2025-04-09 ENCOUNTER — OFFICE VISIT (OUTPATIENT)
Dept: PODIATRY | Facility: CLINIC | Age: 85
End: 2025-04-09
Payer: COMMERCIAL

## 2025-04-09 VITALS — BODY MASS INDEX: 27.2 KG/M2 | HEIGHT: 70 IN | WEIGHT: 190 LBS

## 2025-04-09 DIAGNOSIS — R20.2 PARESTHESIA OF BOTH FEET: ICD-10-CM

## 2025-04-09 DIAGNOSIS — N18.31 TYPE 2 DIABETES MELLITUS WITH STAGE 3A CHRONIC KIDNEY DISEASE, WITH LONG-TERM CURRENT USE OF INSULIN (H): Primary | ICD-10-CM

## 2025-04-09 DIAGNOSIS — L60.2 HYPERTROPHIC TOENAIL: ICD-10-CM

## 2025-04-09 DIAGNOSIS — G62.9 POLYNEUROPATHY: ICD-10-CM

## 2025-04-09 DIAGNOSIS — Z79.4 TYPE 2 DIABETES MELLITUS WITH STAGE 3A CHRONIC KIDNEY DISEASE, WITH LONG-TERM CURRENT USE OF INSULIN (H): Primary | ICD-10-CM

## 2025-04-09 DIAGNOSIS — E11.22 TYPE 2 DIABETES MELLITUS WITH STAGE 3A CHRONIC KIDNEY DISEASE, WITH LONG-TERM CURRENT USE OF INSULIN (H): Primary | ICD-10-CM

## 2025-04-09 PROCEDURE — 99203 OFFICE O/P NEW LOW 30 MIN: CPT | Performed by: PODIATRIST

## 2025-04-09 NOTE — PATIENT INSTRUCTIONS
"Thank you for choosing [facility] / [facility] Ankle Surgery!    DR. CASTILLO'S CLINIC LOCATIONS:     [facility] TRIAGE LINE: [phone]   [address] APPOINTMENTS: [phone]   [place] [address] RADIOLOGY: [phone]   (Every other Tues - Wed - Fri PM) SET UP SURGERY: [phone]    PHYSICAL THERAPY: [phone]   [place] SPECIALTY BILLING QUESTIONS: [phone] [address] FAX: [phone]   [place] [address]    (Thurs & Fri AM)         DIABETES AND YOUR FEET  Diabetes can result in several problems in the feet including ulcers (open sores) and amputations. Two of the most important reasons why people develop foot problems when they have diabetes is : 1. Neuropathy (loss of feeling)  2. Vascular disease (loss or decrease of blood flow).    Neuropathy is a term used to describe a loss of nerve function.  Patients with diabetes are at risk of developing neuropathy if their sugars continue to run high and are above the normal value. One theory for neuropathy is that the \"extra\" sugar in the body enters the nerves and is broken down. These by-products build up in the nerve causing it to swell and impairing nerve function. Often times, this can be prevented by controlling your sugars, dieting and exercise.    When a person develops neuropathy, they usually begin to feel numbness or tingling in their feet and sometime in their legs.  Other symptoms may include painful burning or hot feet, tingling or feeling like insects or ants are crawling on your feet or legs.  If the diabetes is sever and the sugars run high for long periods of time, neuropathy can also occur in the hands.    Vascular disease  is a term used to describe a loss or decrease in circulation (blood flow). There is a problem in getting blood and oxygen to areas that need it. Similar to neuropathy, sugars can build up in the walls of the arteries (blood vessels) and cause them to become swollen, " thickened and hardened. This decreases the amount of blood that can go to an area that needs it. Though this is common in the legs of diabetic patients, it can also affect other arteries (blood vessels) in the body such as in the heart and eyes.    In the legs, vascular disease usually results in cramping. Patients who develop leg cramps after walking the same distance every time (i.e. One block, half a mile, ect.) need to let their doctors know so that their circulation may be checked. Cramps causing severe pain in the feet and/or legs while sleeping and the cramps go away when you stand or hang your legs off the side of the bed, may also be a sign of poor blood circulation.  Occasional cramping in cold weather or on rare occasions with activity may not be due to poor circulation, but you should inform your doctor.    PREVENTION OF THESE DISEASES  The key to prevention is good blood sugar control. Poor blood sugar control is a big reason many of these problems start. Physical activity (exercise) is a very good way to help decrease your blood sugars. Exercise can lower your blood sugar, blood pressure, and cholesterol. It also reduces your risk for heart disease and stroke, relieves stress, and strengthens your heart, muscles and bones.  In addition, regular activity helps insulin work better, improves your blood circulation, and keeps your joints flexible. If you're trying to lose weight, a combination of exercise and wise food choices can help you reach your target weight and maintain it.      PAIN MANAGEMENT (**Please speak with your primary doctor about any medications**)  1.Blood Sugar Control - Most important  2. Medications such as:  Amytriptylline, duloxetine, gabapentin, lyrica, tramadol (talk with your primary care doctor about this).     NUTRITION:  Nutrition is also important to help with healing. If your body does not have what it needs, it can't heal.   Increasing your protein intake is important.   "With wounds you need 60-90gm of protein a day to help with healing. Over the counter protein shakes such as Maximo, Glucerna, Ensure, ect... can help to supplement your daily protein intake.   It is also important to take Vitamins to help with healing.  Vitamins such as B12, B6 and Vitamin D3 are important for healing. These can be gotten over the counter at pharmacies or at stores like [facility] or the Vitamin [employer].    I can also prescribe a dietary supplement called \"Rheumate\" that has a lot of essential vitamins in one capsule.  This may not be covered by insurance though.     FOOT CARE RECOMMENDATIONS   1. Wash your feet with lukewarm water and a mild soap and then dry them thoroughly, especially between the toes.     2. Examine your feet daily looking for cuts, corns, blisters, cracks, ect, especially after wearing new shoes. Make sure to look between your toes. If you cannot see the bottom of your feet, set a mirror on the floor and hold your foot over it, or ask a spouse, friend or family member to examine your feet for you. Contact your doctor immediately if new problems are noted or if sores are not healing.     3. Immediately apply moisturizer to the tops and bottoms of your feet, avoiding areas between the toes. Hand lotion (Intesive Care, Carley, Eucerin, Neutrogena, Curel, ect) is sufficient unless your doctor prescribes a medicated lotion. Apply sunscreen to your feet when going swimming outside.     4. Use clean comfortable shoes, wear white socks (if you have any bleeding or drainage, you will see it on white socks). Socks should not have thick seams or cut off the circulation around the leg. Break in new shoes slowly and rotate with older shoes until broken in. Check the inside of your shoes with your hand to look for areas of irritation or objects that may have fallen into your shoes.       5. Keep slippers by the side of your bed for use during the night.     6.  Shoes should be fitted by a professional " and should not cause areas of irritation.  Check your feet regularly when wearing a new pair of shoes and replace them as needed.     7.  Talk to your doctor about proper exercise. Exercise and stretching stimulate blood flow to your feet and maintain proper glucose levels.     8.  Monitor your blood glucose level as instructed by your doctor. Notify your doctor immediately if your blood sugar is abnormally high or low.    9. Cut your nails straight across, but then gently round any sharp edges with a cardboard nail file. If you have neuropathy, peripheral vascular disease or cannot see that well to trim your own toenails contact [facility] ([phone]) or [facility] ([phone]).      THINGS TO AVOID DOING   1.  Do not soak your feet if you have an open sore. Use only lukewarm water and always check the temperature with your hand as hot water can easily burn your feet.       2.  Never use a hot water bottle or heating pad on your feet. Also do not apply cold compresses to your feet. With decreased sensation, you could burn or freeze your feet.       3.  Do not apply any of these to your feet:    -  Over the counter medicine for corns or warts    -  Harsh chemicals like boric acid    -  Do not self-treat corns, cuts, blisters or infections. Always consult your doctor.       4.  Do not wear sandals, slippers or walk barefoot, especially on hot sand or concrete or other harsh surfaces.     5.  If you smoke, stop!!!         Here is a list of routine foot care resources, which includes toenail trimming and callus/corn management.     This is not a referral. It is your responsibility to contact the organization and your insurance to confirm cost and coverage.      ROUTINE FOOT CARE (NAIL TRIMMING / CALLUSES)      [facility]  [phone]   [facility]  [phone]  Multiple locations   [facility]  [phone] Dr. Barker and Dr. Masters  [address] [address]  [phone]    [facility]  [phone]  Must See [email or web address]           [place] [facility] LOCATIONS  [facility]  [address]  [place] [address]  Phone: [phone]  Fax: [phone] [facility]   [address]  [place] [address]  Phone: [phone]  Fax: [phone]   [facility]  [address]  [place] [address]  Phone: [phone]  Fax: [phone] [facility]  [address]  [place] [address]  Phone: [phone]   Fax: [phone]   * Please call any location listed to make an appointment for a casting/fitting. Your referral was sent to their central office and they will all have the order on file.

## 2025-04-09 NOTE — LETTER
4/9/2025      Pascual Perea  [address]  [facility] [address]      Dear Colleague,    Thank you for referring your patient, Pascual Perea, to the [facility]. Please see a copy of my visit note below.    ASSESSMENT:  Encounter Diagnoses   Name Primary?     Type 2 diabetes mellitus with stage 3a chronic kidney disease, with long-term current use of insulin (H) Yes     Paresthesia of both feet      Polyneuropathy      Hypertrophic toenails      MEDICAL DECISION MAKING:  A diabetic foot exam was performed.  No acute findings.  Pedal pulses are palpable.  Protective sensation largely intact.  He is instructed to inspect his feet on a daily basis.  Referral for custom orthoses and diabetic shoes    A list of alternatives for nail care was provided    I think that the cold sensation he feels in his feet at night is likely some form of peripheral neuropathy.  This is not occurring during the day.  I offered a referral to neurology.   He will consider this, yet the paresthesias are not interfering with activities of daily living and he said it does not keep him awake at night.    Disclaimer: This note consists of symbols derived from keyboarding, dictation and/or voice recognition software. As a result, there may be errors in the script that have gone undetected. Please consider this when interpreting information found in this chart.    Sidney Kenyon DPM, FACFAS, [facility] Department of Podiatry/Foot & Ankle Surgery      ____________________________________________________________________    HPI:       Pascual Perea presents today for diabetic foot exam.  He reports pain involving the bilateral forefoot.  This is described as a coldness that occurs at night.  His feet do not interfere with his paddling for exercise and other daily activities.  He does not use diabetic shoes and custom orthoses.    Type 2 diabetes  Hemoglobin A1c 6.9%  *  Past Medical History:    Diagnosis Date     Adhesive capsulitis of shoulder      Allergic rhinitis 01/07/2008    Problem list name updated by automated process. Provider to review       Anemia 03/10/2014     Anemia, unspecified      Antiplatelet or antithrombotic long-term use      Arrhythmia     Atrial fib/flutter     Atrial fibrillation, unspecified type (H) 05/02/2018     CAD (coronary artery disease)      Chemotherapy-induced neutropenia 07/19/2024     Coronary artery disease involving native coronary artery of native heart without angina pectoris 02/16/2025     Coronary atherosclerosis     Nuc Stress 4/15/16: On both stress and rest images there is a very small size mild intensity apical photopenic defect which is of equal extent and intensity on both stress and rest images. Gated images demonstrated no regional wall motion abnormalities. The left ventricular ejection fraction was calculated to be 61% with stress.       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     History of cardioversion 04/24/2018    a single synchronized shock of 120 joules restored normal sinus rhythm     History of cardioversion 02/13/2019    single shock , 200 joules successul in restoring NSR     History of colonic polyps 03/24/2017     Hyperlipidemia LDL goal <70 05/26/2011     Hypertension      Hypothyroidism      Hypothyroidism, unspecified type 08/15/2016     Impotence of organic origin      Laceration of finger 06/2010     left thumb s/p sutures     Numbness and tingling     diabetic neuropathy bilateral feet     BRANDON (obstructive sleep apnea)     intolerant of CPAP, uses it occasionally.  Using mandibular device occasionally     Osteopenia      Other cirrhosis of liver (H) 04/14/2022     Other dietary vitamin B12 deficiency anemia 12/10/2024     Pulmonary hypertension (H)  04/06/2012     Sensorineural hearing loss, unspecified      Stage 3a chronic kidney disease (H) 02/16/2025     Stented coronary artery 1997    CABG      Testosterone deficiency      Thrombocytopenia 02/16/2025     Type 2 diabetes mellitus with stage 3a chronic kidney disease, with long-term current use of insulin (H) 02/16/2025     Type 2 diabetes mellitus without complication  (goal A1C<8) 10/24/2015     Typical atrial flutter (H) 04/18/2016     Unspecified hypothyroidism      Vitamin D deficiency 09/03/2011   *  *  Past Surgical History:   Procedure Laterality Date     ANESTHESIA CARDIOVERSION N/A 02/13/2019    Procedure: ANESTHESIA CARDIOVERSION;  Surgeon: GENERIC ANESTHESIA PROVIDER;  Location:  OR     ANESTHESIA CARDIOVERSION N/A 05/22/2019    Procedure: ANESTHESIA, FOR CARDIOVERSION;  Surgeon: GENERIC ANESTHESIA PROVIDER;  Location:  OR     CARDIAC SURGERY      bypass in 1997     CARDIOVERSION  04/24/2018     COLONOSCOPY       CORONARY ANGIOGRAPHY ADULT ORDER      4/2/2012     CORONARY ARTERY BYPASS  1997    [facility]     ENDOSCOPIC ULTRASOUND UPPER GASTROINTESTINAL TRACT (GI) N/A 03/14/2019    Procedure: ENDOSCOPIC ULTRASOUND OF UPPER GASTROINTESTINAL TRACT;  Surgeon: Ju Torres MD;  Location:  OR     ESOPHAGOSCOPY, GASTROSCOPY, DUODENOSCOPY (EGD), COMBINED N/A 4/15/2022    Procedure: ESOPHAGOGASTRODUODENOSCOPY (EGD);  Surgeon: Erik Baca DO;  Location:  GI     EXCISE MASS HEAD Left 08/18/2016    Procedure: EXCISE MASS HEAD;  Surgeon: Ivan Hernandez MD;  Location:  SD     HEART CATH, ANGIOPLASTY  04/2012     IR CHEST PORT PLACEMENT > 5 YRS OF AGE  8/9/2024     IR PORT REMOVAL RIGHT  2/17/2025     LAPAROSCOPIC CHOLECYSTECTOMY N/A 03/17/2019    Procedure: LAPAROSCOPIC CHOLECYSTECTOMY;  Surgeon: Janel aPz MD;  Location:  OR     PHACOEMULSIFICATION CLEAR CORNEA WITH STANDARD INTRAOCULAR LENS IMPLANT Left 06/08/2015    Procedure:  PHACOEMULSIFICATION CLEAR CORNEA WITH STANDARD INTRAOCULAR LENS IMPLANT;  Surgeon: Nixon Farnsworth MD;  Location:  EC     PHACOEMULSIFICATION CLEAR CORNEA WITH STANDARD INTRAOCULAR LENS IMPLANT Right 06/22/2015    Procedure: PHACOEMULSIFICATION CLEAR CORNEA WITH STANDARD INTRAOCULAR LENS IMPLANT;  Surgeon: Nixon Farnsworth MD;  Location:  EC     SEPTOPLASTY, TURBINOPLASTY, COMBINED Bilateral 08/18/2016    Procedure: COMBINED SEPTOPLASTY, TURBINOPLASTY;  Surgeon: Ivan Hernandez MD;  Location:  SD     [facility] NONSPECIFIC PROCEDURE  1997    CABG (Spiritism)     [facility] NONSPECIFIC PROCEDURE  08/2001    stress echo   *  *  Current Outpatient Medications   Medication Sig Dispense Refill     atorvastatin (LIPITOR) 40 MG tablet TAKE 1 TABLET (40 MG) BY MOUTH DAILY 90 tablet 2     azelastine 137 MCG/SPRAY SOLN [place] 2 sprays into both nostrils 2 times daily. (Patient taking differently: Spray 2 sprays into both nostrils 2 times daily as needed (runny nose).) 30 mL 1     blood glucose (ACCU-CHEK NORMAN PLUS) test strip USE TO TEST BLOOD SUGAR 3 TIMES A DAY OR AS DIRECTED 100 strip 2     blood glucose calibration (ACCU-CHEK NORMAN) solution USE AS DIRECTED 1 each 0     Continuous Blood Gluc  (FREESTYLE DELFINA 2 READER) CHRYSTAL 1 Device continuous 1 each 1     Continuous Glucose Sensor (FREESTYLE DELFINA 2 SENSOR) [facility] APPLY 1 SENSOR AND CHANGE EVERY 14 DAYS AS DIRECTED 6 each 3     cyanocobalamin (VITAMIN B-12) 1000 MCG tablet Take 1 tablet (1,000 mcg) by mouth daily.       ELIQUIS ANTICOAGULANT 5 MG tablet Take 1 tablet by mouth 2 times daily.       fenofibrate micronized (LOFIBRA) 67 MG capsule TAKE 1 CAPSULE BY MOUTH EVERY MORNING BEFORE BREAKFAST 90 capsule 3     glucose 4 g CHEW Take 1 tablet by mouth every hour as needed for low blood sugar        glucose-vitamin C (DEX4 GLUCOSE) 4-6 GM-MG CHEW Take 1 tablet by mouth every hour as needed for low blood sugar.       Insulin Aspart FlexPen 100 UNIT/ML SOPN  "INJECT UNDER THE SKIN 3 TIMES PER DAY (BEFORE MEALS) 3 UNITS PER CARB CHOICE (15GM) APPROX 10-15 UNITS/MEAL PLUS PRIMING 15 mL 4     insulin glargine (LANTUS SOLOSTAR) 100 UNIT/ML pen Inject 30 Units subcutaneously 2 times daily. 30 mL 3     insulin pen needle (GLOBAL EASE INJECT PEN NEEDLES) 31G X 5 MM miscellaneous Use one pen needle 5 times a day 500 each 3     isosorbide mononitrate (IMDUR) 30 MG 24 hr tablet Take 1 tablet (30 mg) by mouth daily 90 tablet 3     levothyroxine (SYNTHROID/LEVOTHROID) 125 MCG tablet Take 1 tablet (125 mcg) by mouth daily 90 tablet 3     nitroGLYcerin (NITROSTAT) 0.4 MG sublingual tablet Place 1 tablet (0.4 mg) under the tongue every 5 minutes as needed for chest pain 25 tablet 3         EXAM:    Vitals: Ht 1.765 m (5' 9.5\")   Wt 86.2 kg (190 lb)   BMI 27.66 kg/m    BMI: Body mass index is 27.66 kg/m .    Diabetic Foot Exam (details below):  normal DP and PT pulses, no trophic changes or ulcerative lesions, and normal sensory exam    Vasc:      Pedal pulses are palpable for the dorsalis pedis posterior tibial artery, bilateral foot.  Capillary fill time </= 3 seconds  Pedal skin appears well-perfused  Neuro:      Protective sensation was diminished and only 1 location on the right foot, otherwise intact per [place] monofilament testing.  Light touch sensation intact to all sensory nerve distributions, bilateral foot.  No apparent spastic contractures or other deformity secondary to neurologic compromise.  Derm:      Toenails are elongated  No calluses  No wounds   No worrisome lesions  MSK:      Bilateral lower extremity muscle strength presents is normal.  No gross deformities  Adequate ankle and subtalar joint range of motion  Calf:    Neg for redness, swelling or tenderness        Again, thank you for allowing me to participate in the care of your patient.        Sincerely,        Sidney Kenyon DPM    Electronically signed" Graft Donor Site Bandage (Optional-Leave Blank If You Don't Want In Note): Steri-strips and a pressure bandage were applied to the donor site.

## 2025-04-09 NOTE — PROGRESS NOTES
ASSESSMENT:  Encounter Diagnoses   Name Primary?    Type 2 diabetes mellitus with stage 3a chronic kidney disease, with long-term current use of insulin (H) Yes    Paresthesia of both feet     Polyneuropathy     Hypertrophic toenails      MEDICAL DECISION MAKING:  A diabetic foot exam was performed.  No acute findings.  Pedal pulses are palpable.  Protective sensation largely intact.  He is instructed to inspect his feet on a daily basis.  Referral for custom orthoses and diabetic shoes    A list of alternatives for nail care was provided    I think that the cold sensation he feels in his feet at night is likely some form of peripheral neuropathy.  This is not occurring during the day.  I offered a referral to neurology.   He will consider this, yet the paresthesias are not interfering with activities of daily living and he said it does not keep him awake at night.    Disclaimer: This note consists of symbols derived from keyboarding, dictation and/or voice recognition software. As a result, there may be errors in the script that have gone undetected. Please consider this when interpreting information found in this chart.    Sidney Kenyon, SATYA, FACFAS, MS    [facility]le [facility]      ____________________________________________________________________    HPI:       Pascual Perea presents today for diabetic foot exam.  He reports pain involving the bilateral forefoot.  This is described as a coldness that occurs at night.  His feet do not interfere with his paddling for exercise and other daily activities.  He does not use diabetic shoes and custom orthoses.    Type 2 diabetes  Hemoglobin A1c 6.9%  *  Past Medical History:   Diagnosis Date    Adhesive capsulitis of shoulder     Allergic rhinitis 01/07/2008    Problem list name updated by automated process. Provider to review      Anemia 03/10/2014    Anemia, unspecified     Antiplatelet or antithrombotic long-term use     Arrhythmia      Atrial fib/flutter    Atrial fibrillation, unspecified type (H) 05/02/2018    CAD (coronary artery disease)     Chemotherapy-induced neutropenia 07/19/2024    Coronary artery disease involving native coronary artery of native heart without angina pectoris 02/16/2025    Coronary atherosclerosis     Nuc Stress 4/15/16: On both stress and rest images there is a very small size mild intensity apical photopenic defect which is of equal extent and intensity on both stress and rest images. Gated images demonstrated no regional wall motion abnormalities. The left ventricular ejection fraction was calculated to be 61% with stress.      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    History of cardioversion 04/24/2018    a single synchronized shock of 120 joules restored normal sinus rhythm    History of cardioversion 02/13/2019    single shock , 200 joules successul in restoring NSR    History of colonic polyps 03/24/2017    Hyperlipidemia LDL goal <70 05/26/2011    Hypertension     Hypothyroidism     Hypothyroidism, unspecified type 08/15/2016    Impotence of organic origin     Laceration of finger 06/2010     left thumb s/p sutures    Numbness and tingling     diabetic neuropathy bilateral feet    BRANDON (obstructive sleep apnea)     intolerant of CPAP, uses it occasionally.  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 disease, with long-term current use of  insulin (H) 02/16/2025    Type 2 diabetes mellitus without complication  (goal A1C<8) 10/24/2015    Typical atrial flutter (H) 04/18/2016    Unspecified hypothyroidism     Vitamin D deficiency 09/03/2011   *  *  Past Surgical History:   Procedure Laterality Date    ANESTHESIA CARDIOVERSION N/A 02/13/2019    Procedure: ANESTHESIA CARDIOVERSION;  Surgeon: GENERIC ANESTHESIA PROVIDER;  Location:  OR    ANESTHESIA CARDIOVERSION N/A 05/22/2019    Procedure: ANESTHESIA, FOR CARDIOVERSION;  Surgeon: GENERIC ANESTHESIA PROVIDER;  Location:  OR    CARDIAC SURGERY      bypass in 1997    CARDIOVERSION  04/24/2018    COLONOSCOPY      CORONARY ANGIOGRAPHY ADULT ORDER      4/2/2012    CORONARY ARTERY BYPASS  1997    [facility]    ENDOSCOPIC ULTRASOUND UPPER GASTROINTESTINAL TRACT (GI) N/A 03/14/2019    Procedure: ENDOSCOPIC ULTRASOUND OF UPPER GASTROINTESTINAL TRACT;  Surgeon: Ju Torres MD;  Location:  OR    ESOPHAGOSCOPY, GASTROSCOPY, DUODENOSCOPY (EGD), COMBINED N/A 4/15/2022    Procedure: ESOPHAGOGASTRODUODENOSCOPY (EGD);  Surgeon: Erik Baca DO;  Location:  GI    EXCISE MASS HEAD Left 08/18/2016    Procedure: EXCISE MASS HEAD;  Surgeon: Ivan Hernandez MD;  Location:  SD    HEART CATH, ANGIOPLASTY  04/2012    IR CHEST PORT PLACEMENT > 5 YRS OF AGE  8/9/2024    IR PORT REMOVAL RIGHT  2/17/2025    LAPAROSCOPIC CHOLECYSTECTOMY N/A 03/17/2019    Procedure: LAPAROSCOPIC CHOLECYSTECTOMY;  Surgeon: Janel Paz MD;  Location:  OR    PHACOEMULSIFICATION CLEAR CORNEA WITH STANDARD INTRAOCULAR LENS IMPLANT Left 06/08/2015    Procedure: PHACOEMULSIFICATION CLEAR CORNEA WITH STANDARD INTRAOCULAR LENS IMPLANT;  Surgeon: Nixon Farnsworth MD;  Location:  EC    PHACOEMULSIFICATION CLEAR CORNEA WITH STANDARD INTRAOCULAR LENS IMPLANT Right 06/22/2015    Procedure: PHACOEMULSIFICATION CLEAR CORNEA WITH STANDARD INTRAOCULAR LENS IMPLANT;  Surgeon: Nixon Farnsworth  MD;  Location:  EC    SEPTOPLASTY, TURBINOPLASTY, COMBINED Bilateral 08/18/2016    Procedure: COMBINED SEPTOPLASTY, TURBINOPLASTY;  Surgeon: Ivan Hernandez MD;  Location: [facility] NONSPECIFIC PROCEDURE  1997    CABG ([place])    [facility] NONSPECIFIC PROCEDURE  08/2001    stress echo   *  *  Current Outpatient Medications   Medication Sig Dispense Refill    atorvastatin (LIPITOR) 40 MG tablet TAKE 1 TABLET (40 MG) BY MOUTH DAILY 90 tablet 2    azelastine 137 MCG/SPRAY SOLN [place] 2 sprays into both nostrils 2 times daily. (Patient taking differently: Spray 2 sprays into both nostrils 2 times daily as needed (runny nose).) 30 mL 1    blood glucose (ACCU-CHEK NORMAN PLUS) test strip USE TO TEST BLOOD SUGAR 3 TIMES A DAY OR AS DIRECTED 100 strip 2    blood glucose calibration (ACCU-CHEK NORMAN) solution USE AS DIRECTED 1 each 0    Continuous Blood Gluc  (FREESTYLE DELFINA 2 READER) CHRYSTAL 1 Device continuous 1 each 1    Continuous Glucose Sensor (FREESTYLE DELFINA 2 SENSOR) [facility] APPLY 1 SENSOR AND CHANGE EVERY 14 DAYS AS DIRECTED 6 each 3    cyanocobalamin (VITAMIN B-12) 1000 MCG tablet Take 1 tablet (1,000 mcg) by mouth daily.      ELIQUIS ANTICOAGULANT 5 MG tablet Take 1 tablet by mouth 2 times daily.      fenofibrate micronized (LOFIBRA) 67 MG capsule TAKE 1 CAPSULE BY MOUTH EVERY MORNING BEFORE BREAKFAST 90 capsule 3    glucose 4 g CHEW Take 1 tablet by mouth every hour as needed for low blood sugar       glucose-vitamin C (DEX4 GLUCOSE) 4-6 GM-MG CHEW Take 1 tablet by mouth every hour as needed for low blood sugar.      Insulin Aspart FlexPen 100 UNIT/ML SOPN INJECT UNDER THE SKIN 3 TIMES PER DAY (BEFORE MEALS) 3 UNITS PER CARB CHOICE (15GM) APPROX 10-15 UNITS/MEAL PLUS PRIMING 15 mL 4    insulin glargine (LANTUS SOLOSTAR) 100 UNIT/ML pen Inject 30 Units subcutaneously 2 times daily. 30 mL 3    insulin pen needle (GLOBAL EASE INJECT PEN NEEDLES) 31G X 5 MM miscellaneous Use one pen needle 5 times a day 500  "each 3    isosorbide mononitrate (IMDUR) 30 MG 24 hr tablet Take 1 tablet (30 mg) by mouth daily 90 tablet 3    levothyroxine (SYNTHROID/LEVOTHROID) 125 MCG tablet Take 1 tablet (125 mcg) by mouth daily 90 tablet 3    nitroGLYcerin (NITROSTAT) 0.4 MG sublingual tablet Place 1 tablet (0.4 mg) under the tongue every 5 minutes as needed for chest pain 25 tablet 3         EXAM:    Vitals: Ht 1.765 m (5' 9.5\")   Wt 86.2 kg (190 lb)   BMI 27.66 kg/m    BMI: Body mass index is 27.66 kg/m .    Diabetic Foot Exam (details below):  normal DP and PT pulses, no trophic changes or ulcerative lesions, and normal sensory exam    Vasc:      Pedal pulses are palpable for the dorsalis pedis posterior tibial artery, bilateral foot.  Capillary fill time </= 3 seconds  Pedal skin appears well-perfused  Neuro:      Protective sensation was diminished and only 1 location on the right foot, otherwise intact per [place] [facility] monofilament testing.  Light touch sensation intact to all sensory nerve distributions, bilateral foot.  No apparent spastic contractures or other deformity secondary to neurologic compromise.  Derm:      Toenails are elongated  No calluses  No wounds   No worrisome lesions  MSK:      Bilateral lower extremity muscle strength presents is normal.  No gross deformities  Adequate ankle and subtalar joint range of motion  Calf:    Neg for redness, swelling or tenderness      "

## 2025-04-16 ENCOUNTER — APPOINTMENT (OUTPATIENT)
Dept: MRI IMAGING | Facility: CLINIC | Age: 85
DRG: 054 | End: 2025-04-16
Attending: PHYSICIAN ASSISTANT
Payer: COMMERCIAL

## 2025-04-16 ENCOUNTER — APPOINTMENT (OUTPATIENT)
Dept: CT IMAGING | Facility: CLINIC | Age: 85
DRG: 054 | End: 2025-04-16
Attending: EMERGENCY MEDICINE
Payer: COMMERCIAL

## 2025-04-16 ENCOUNTER — APPOINTMENT (OUTPATIENT)
Dept: GENERAL RADIOLOGY | Facility: CLINIC | Age: 85
DRG: 054 | End: 2025-04-16
Attending: EMERGENCY MEDICINE
Payer: COMMERCIAL

## 2025-04-16 ENCOUNTER — HOSPITAL ENCOUNTER (INPATIENT)
Facility: CLINIC | Age: 85
DRG: 054 | End: 2025-04-16
Attending: EMERGENCY MEDICINE | Admitting: INTERNAL MEDICINE
Payer: COMMERCIAL

## 2025-04-16 DIAGNOSIS — E11.22 TYPE 2 DIABETES MELLITUS WITH STAGE 3B CHRONIC KIDNEY DISEASE, WITH LONG-TERM CURRENT USE OF INSULIN (H): ICD-10-CM

## 2025-04-16 DIAGNOSIS — G93.89 BRAIN MASS: ICD-10-CM

## 2025-04-16 DIAGNOSIS — Z79.4 TYPE 2 DIABETES MELLITUS WITH STAGE 3B CHRONIC KIDNEY DISEASE, WITH LONG-TERM CURRENT USE OF INSULIN (H): ICD-10-CM

## 2025-04-16 DIAGNOSIS — N18.32 TYPE 2 DIABETES MELLITUS WITH STAGE 3B CHRONIC KIDNEY DISEASE, WITH LONG-TERM CURRENT USE OF INSULIN (H): ICD-10-CM

## 2025-04-16 DIAGNOSIS — G93.6 VASOGENIC BRAIN EDEMA (H): ICD-10-CM

## 2025-04-16 DIAGNOSIS — R41.82 ALTERED MENTAL STATUS, UNSPECIFIED ALTERED MENTAL STATUS TYPE: ICD-10-CM

## 2025-04-16 DIAGNOSIS — D49.6 BRAIN TUMOR (H): Primary | ICD-10-CM

## 2025-04-16 LAB
ALBUMIN UR-MCNC: NEGATIVE MG/DL
ANION GAP SERPL CALCULATED.3IONS-SCNC: 12 MMOL/L (ref 7–15)
APPEARANCE UR: CLEAR
ATRIAL RATE - MUSE: NORMAL BPM
B-OH-BUTYR SERPL-SCNC: <0.18 MMOL/L
BASOPHILS # BLD AUTO: 0 10E3/UL (ref 0–0.2)
BASOPHILS NFR BLD AUTO: 1 %
BILIRUB UR QL STRIP: NEGATIVE
BUN SERPL-MCNC: 23.5 MG/DL (ref 8–23)
CALCIUM SERPL-MCNC: 10.3 MG/DL (ref 8.8–10.4)
CHLORIDE SERPL-SCNC: 101 MMOL/L (ref 98–107)
COLOR UR AUTO: YELLOW
CREAT SERPL-MCNC: 1.22 MG/DL (ref 0.67–1.17)
DIASTOLIC BLOOD PRESSURE - MUSE: NORMAL MMHG
EGFRCR SERPLBLD CKD-EPI 2021: 59 ML/MIN/1.73M2
EOSINOPHIL # BLD AUTO: 0.1 10E3/UL (ref 0–0.7)
EOSINOPHIL NFR BLD AUTO: 2 %
ERYTHROCYTE [DISTWIDTH] IN BLOOD BY AUTOMATED COUNT: 14.2 % (ref 10–15)
GLUCOSE BLDC GLUCOMTR-MCNC: 228 MG/DL (ref 70–99)
GLUCOSE SERPL-MCNC: 247 MG/DL (ref 70–99)
GLUCOSE UR STRIP-MCNC: NEGATIVE MG/DL
HCO3 SERPL-SCNC: 24 MMOL/L (ref 22–29)
HCT VFR BLD AUTO: 37 % (ref 40–53)
HGB BLD-MCNC: 12.7 G/DL (ref 13.3–17.7)
HGB UR QL STRIP: NEGATIVE
HOLD SPECIMEN: NORMAL
IMM GRANULOCYTES # BLD: 0 10E3/UL
IMM GRANULOCYTES NFR BLD: 0 %
INTERPRETATION ECG - MUSE: NORMAL
KETONES UR STRIP-MCNC: NEGATIVE MG/DL
LEUKOCYTE ESTERASE UR QL STRIP: NEGATIVE
LYMPHOCYTES # BLD AUTO: 0.7 10E3/UL (ref 0.8–5.3)
LYMPHOCYTES NFR BLD AUTO: 13 %
MCH RBC QN AUTO: 30.5 PG (ref 26.5–33)
MCHC RBC AUTO-ENTMCNC: 34.3 G/DL (ref 31.5–36.5)
MCV RBC AUTO: 89 FL (ref 78–100)
MONOCYTES # BLD AUTO: 0.4 10E3/UL (ref 0–1.3)
MONOCYTES NFR BLD AUTO: 7 %
MUCOUS THREADS #/AREA URNS LPF: PRESENT /LPF
NEUTROPHILS # BLD AUTO: 4.2 10E3/UL (ref 1.6–8.3)
NEUTROPHILS NFR BLD AUTO: 78 %
NITRATE UR QL: NEGATIVE
NRBC # BLD AUTO: 0 10E3/UL
NRBC BLD AUTO-RTO: 0 /100
P AXIS - MUSE: NORMAL DEGREES
PH UR STRIP: 6.5 [PH] (ref 5–7)
PLATELET # BLD AUTO: 133 10E3/UL (ref 150–450)
POTASSIUM SERPL-SCNC: 4.1 MMOL/L (ref 3.4–5.3)
PR INTERVAL - MUSE: NORMAL MS
PROCALCITONIN SERPL IA-MCNC: 0.05 NG/ML
QRS DURATION - MUSE: 132 MS
QT - MUSE: 416 MS
QTC - MUSE: 483 MS
R AXIS - MUSE: 24 DEGREES
RAD FLAG-ADDENDUM: ABNORMAL
RBC # BLD AUTO: 4.17 10E6/UL (ref 4.4–5.9)
RBC URINE: <1 /HPF
SODIUM SERPL-SCNC: 137 MMOL/L (ref 135–145)
SP GR UR STRIP: 1.02 (ref 1–1.03)
SYSTOLIC BLOOD PRESSURE - MUSE: NORMAL MMHG
T AXIS - MUSE: 204 DEGREES
TROPONIN T SERPL HS-MCNC: 42 NG/L
TROPONIN T SERPL HS-MCNC: 45 NG/L
UROBILINOGEN UR STRIP-MCNC: 4 MG/DL
VENTRICULAR RATE- MUSE: 81 BPM
WBC # BLD AUTO: 5.4 10E3/UL (ref 4–11)
WBC URINE: 1 /HPF

## 2025-04-16 PROCEDURE — 250N000011 HC RX IP 250 OP 636: Performed by: EMERGENCY MEDICINE

## 2025-04-16 PROCEDURE — 70553 MRI BRAIN STEM W/O & W/DYE: CPT

## 2025-04-16 PROCEDURE — 96361 HYDRATE IV INFUSION ADD-ON: CPT

## 2025-04-16 PROCEDURE — 85004 AUTOMATED DIFF WBC COUNT: CPT | Performed by: EMERGENCY MEDICINE

## 2025-04-16 PROCEDURE — 258N000003 HC RX IP 258 OP 636: Performed by: EMERGENCY MEDICINE

## 2025-04-16 PROCEDURE — 70450 CT HEAD/BRAIN W/O DYE: CPT

## 2025-04-16 PROCEDURE — 99222 1ST HOSP IP/OBS MODERATE 55: CPT | Performed by: PHYSICIAN ASSISTANT

## 2025-04-16 PROCEDURE — 84484 ASSAY OF TROPONIN QUANT: CPT | Performed by: EMERGENCY MEDICINE

## 2025-04-16 PROCEDURE — 84145 PROCALCITONIN (PCT): CPT | Performed by: EMERGENCY MEDICINE

## 2025-04-16 PROCEDURE — 99223 1ST HOSP IP/OBS HIGH 75: CPT | Performed by: INTERNAL MEDICINE

## 2025-04-16 PROCEDURE — 120N000013 HC R&B IMCU

## 2025-04-16 PROCEDURE — 71046 X-RAY EXAM CHEST 2 VIEWS: CPT

## 2025-04-16 PROCEDURE — A9585 GADOBUTROL INJECTION: HCPCS | Performed by: PHYSICIAN ASSISTANT

## 2025-04-16 PROCEDURE — 80048 BASIC METABOLIC PNL TOTAL CA: CPT | Performed by: EMERGENCY MEDICINE

## 2025-04-16 PROCEDURE — 255N000002 HC RX 255 OP 636: Performed by: PHYSICIAN ASSISTANT

## 2025-04-16 PROCEDURE — 82962 GLUCOSE BLOOD TEST: CPT

## 2025-04-16 PROCEDURE — 96374 THER/PROPH/DIAG INJ IV PUSH: CPT

## 2025-04-16 PROCEDURE — 36415 COLL VENOUS BLD VENIPUNCTURE: CPT | Performed by: EMERGENCY MEDICINE

## 2025-04-16 PROCEDURE — 81001 URINALYSIS AUTO W/SCOPE: CPT | Performed by: EMERGENCY MEDICINE

## 2025-04-16 PROCEDURE — 99285 EMERGENCY DEPT VISIT HI MDM: CPT | Mod: 25

## 2025-04-16 PROCEDURE — 82010 KETONE BODYS QUAN: CPT | Performed by: EMERGENCY MEDICINE

## 2025-04-16 PROCEDURE — 93005 ELECTROCARDIOGRAM TRACING: CPT

## 2025-04-16 RX ORDER — SODIUM CHLORIDE 9 MG/ML
INJECTION, SOLUTION INTRAVENOUS CONTINUOUS
Status: DISCONTINUED | OUTPATIENT
Start: 2025-04-16 | End: 2025-04-17

## 2025-04-16 RX ORDER — POLYETHYLENE GLYCOL 400 2.5 MG/ML
1 SOLUTION/ DROPS OPHTHALMIC DAILY
Status: ON HOLD | COMMUNITY

## 2025-04-16 RX ORDER — GADOBUTROL 604.72 MG/ML
15 INJECTION INTRAVENOUS ONCE
Status: COMPLETED | OUTPATIENT
Start: 2025-04-16 | End: 2025-04-16

## 2025-04-16 RX ORDER — DEXAMETHASONE SODIUM PHOSPHATE 10 MG/ML
10 INJECTION, SOLUTION INTRAMUSCULAR; INTRAVENOUS ONCE
Status: COMPLETED | OUTPATIENT
Start: 2025-04-16 | End: 2025-04-16

## 2025-04-16 RX ORDER — MENTHOL 5 G/1
1 PATCH TOPICAL AT BEDTIME
Status: ON HOLD | COMMUNITY

## 2025-04-16 RX ADMIN — GADOBUTROL 15 ML: 604.72 INJECTION INTRAVENOUS at 22:00

## 2025-04-16 RX ADMIN — DEXAMETHASONE SODIUM PHOSPHATE 10 MG: 10 INJECTION, SOLUTION INTRAMUSCULAR; INTRAVENOUS at 18:38

## 2025-04-16 RX ADMIN — SODIUM CHLORIDE: 900 INJECTION INTRAVENOUS at 22:46

## 2025-04-16 RX ADMIN — SODIUM CHLORIDE 1000 ML: 9 INJECTION, SOLUTION INTRAVENOUS at 16:49

## 2025-04-16 ASSESSMENT — ACTIVITIES OF DAILY LIVING (ADL)
ADLS_ACUITY_SCORE: 57

## 2025-04-16 ASSESSMENT — COLUMBIA-SUICIDE SEVERITY RATING SCALE - C-SSRS
6. HAVE YOU EVER DONE ANYTHING, STARTED TO DO ANYTHING, OR PREPARED TO DO ANYTHING TO END YOUR LIFE?: NO
1. IN THE PAST MONTH, HAVE YOU WISHED YOU WERE DEAD OR WISHED YOU COULD GO TO SLEEP AND NOT WAKE UP?: NO
2. HAVE YOU ACTUALLY HAD ANY THOUGHTS OF KILLING YOURSELF IN THE PAST MONTH?: NO

## 2025-04-16 NOTE — ED TRIAGE NOTES
Patient with generalized weakness worsening for several days, son reports patient with blood glucose level higher than before and sleeps a lot.    Triage Assessment (Adult)       Row Name 04/16/25 1343          Triage Assessment    Airway WDL WDL        Respiratory WDL    Respiratory WDL WDL        Skin Circulation/Temperature WDL    Skin Circulation/Temperature WDL WDL        Cardiac WDL    Cardiac WDL WDL        Peripheral/Neurovascular WDL    Peripheral Neurovascular WDL WDL        Cognitive/Neuro/Behavioral WDL    Cognitive/Neuro/Behavioral WDL WDL

## 2025-04-16 NOTE — ED PROVIDER NOTES
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 Patient reports generalized weakness and fatigue over the past two weeks. He states he has been sleeping all day. During this time period he has difficulty ambulating due to his legs feeling weak, and has been using a cane. Also has had wording finding difficulty over the past 2 weeks. He also states his feet have been bothersome, he saw his podiatrist last week for this. His blood sugars have also been high recently. He has been using insulin, no recent change in dose. He has been eating normally. He has a headache currently. Denies any pain. He also reports two episodes of diarrhea over the past few weeks. No nausea, vomiting, fever, cough. No abdominal pain. No dysuria, hematuria. No leg swelling. Reports constantly feeling cold. He reports falling a few months ago, did not hit his head. He lives with his son. Patient had radiation and chemotherapy for just over a month last fall for neuroendocrine carcinoma.      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   Thrombocytopenia  "    Medications   Lipitor   Lofibra  Insulin aspart   Insulin glargine   Imdur  Levothyroxine  Eliquis 5 mg       Surgical History   Cardiac stent placement   CABG   Excision mass head, left   Intraocular lens implant   Septoplasty     Physical Exam     Patient Vitals for the past 24 hrs:   BP Temp Temp src Pulse Resp SpO2 Height Weight   04/16/25 1715 (!) 150/53 -- -- 53 10 -- -- --   04/16/25 1702 -- -- -- -- -- 96 % -- --   04/16/25 1700 (!) 155/62 -- -- 53 12 -- -- --   04/16/25 1339 133/79 97.4  F (36.3  C) Temporal 81 16 97 % 1.803 m (5' 11\") 83.9 kg (185 lb)     Physical Exam  Nursing note and vitals reviewed.  Constitutional:  Appears well-developed and well-nourished.   HENT:   Head:    Atraumatic.   Mouth/Throat:   Oropharynx is clear and dry. No oropharyngeal exudate.   Eyes:    Pupils are equal, round, and reactive to light.   Neck:    Normal range of motion. Neck supple.      No tracheal deviation present. No thyromegaly present.   Cardiovascular:  Normal rate, regular rhythm, no murmur   Pulmonary/Chest: Breath sounds are clear and equal without wheezes or crackles.  Abdominal:   Soft. Bowel sounds are normal. Exhibits no distension and      no mass. There is no tenderness.      There is no rebound and no guarding.   Musculoskeletal:  Exhibits no edema. Feet appear normal without redness or swelling.  Lymphadenopathy:  No cervical adenopathy.   Neurological:   Alert and oriented to person, place, and time. GCS 15.  CN 2-12 intact.  and proximal upper extremity strength strong and equal.  Bilateral lower extremity strength strong and equal, including strong dorsiflexion and plantarflexion strength.  Sensation intact and equal to the face, arms and legs.  No facial droop or weakness. Normal speech.  Follows commands and answers questions normally.     Skin:    Skin is warm and dry. No rash noted. No pallor.            Diagnostics     Lab Results   Labs Ordered and Resulted from Time of ED Arrival to " Time of ED Departure   BASIC METABOLIC PANEL - Abnormal       Result Value    Sodium 137      Potassium 4.1      Chloride 101      Carbon Dioxide (CO2) 24      Anion Gap 12      Urea Nitrogen 23.5 (*)     Creatinine 1.22 (*)     GFR Estimate 59 (*)     Calcium 10.3      Glucose 247 (*)    CBC WITH PLATELETS AND DIFFERENTIAL - Abnormal    WBC Count 5.4      RBC Count 4.17 (*)     Hemoglobin 12.7 (*)     Hematocrit 37.0 (*)     MCV 89      MCH 30.5      MCHC 34.3      RDW 14.2      Platelet Count 133 (*)     % Neutrophils 78      % Lymphocytes 13      % Monocytes 7      % Eosinophils 2      % Basophils 1      % Immature Granulocytes 0      NRBCs per 100 WBC 0      Absolute Neutrophils 4.2      Absolute Lymphocytes 0.7 (*)     Absolute Monocytes 0.4      Absolute Eosinophils 0.1      Absolute Basophils 0.0      Absolute Immature Granulocytes 0.0      Absolute NRBCs 0.0     GLUCOSE BY METER - Abnormal    GLUCOSE BY METER POCT 228 (*)    TROPONIN T, HIGH SENSITIVITY - Abnormal    Troponin T, High Sensitivity 42 (*)    TROPONIN T, HIGH SENSITIVITY - Abnormal    Troponin T, High Sensitivity 45 (*)    KETONE BETA-HYDROXYBUTYRATE QUANTITATIVE, RAPID - Normal    Ketone (Beta-Hydroxybutyrate) Quantitative <0.18     PROCALCITONIN - Normal    Procalcitonin 0.05     ROUTINE UA WITH MICROSCOPIC REFLEX TO CULTURE       Imaging   MR Brain w/o & w Contrast   Final Result   IMPRESSION:   1.  Large mass right temporal lobe with extensive surrounding vasogenic edema, measures 5.3 x 3.8 x 4.3 cm. Hemorrhagic signal within the mass.   2.  Right-to-left midline shift 4 to 5 mm.         CT Head w/o Contrast   Final Result   Abnormal   Addendum (preliminary) 1 of 1      [Critical Result: Intracranial mass]      Finding was identified on 4/16/2025 5:05 PM CDT.       Dr. Sandoval was contacted by me on 4/16/2025 5:15 PM CDT and verbalized    understanding of the critical result.       Final   IMPRESSION:   1.  Large mass in the right anterior  temporal lobe measures 5.3 x 4 x 3.8 cm. Associated hemorrhage within the posterior (cystic) aspect of this mass.   2.  Significant surrounding vasogenic edema.   3.  Right to left midline shift 6.6 mm.   4.  Recommend MRI without and with intravenous contrast.         Chest XR,  PA & LAT   Final Result   IMPRESSION: Stable size of cardiomediastinal silhouette status post median sternotomy and CABG. No focal airspace consolidation, pleural effusion or pneumothorax. No acute bony abnormality. Prior cholecystectomy.          EKG   ECG results from 04/16/25   EKG 12-lead, tracing only     Value    Systolic Blood Pressure     Diastolic Blood Pressure     Ventricular Rate 81    Atrial Rate     IL Interval     QRS Duration 132        QTc 483    P Axis     R AXIS 24    T Axis 204    Interpretation ECG      Wide QRS rhythm  Left ventricular hypertrophy with QRS widening and repolarization abnormality ( Sokolow-Watson , Bill product , Romhilt-Vasquez )  Read by me at 1545        *Note: Due to a large number of results and/or encounters for the requested time period, some results have not been displayed. A complete set of results can be found in Results Review.       Independent Interpretation   CXR: No pneumothorax, infiltrate, pleural effusion, pulmonary edema, visible rib fracture, cardiomegaly, or mediastinal widening.  CT Head: Large mass in right temporal region with vasogenic edema and 6 mm midline shift. .    ED Course      Medications Administered   Medications   sodium chloride 0.9% BOLUS 1,000 mL (1,000 mLs Intravenous $New Bag 4/16/25 1649)   dexAMETHasone PF (DECADRON) injection 10 mg (10 mg Intravenous $Given 4/16/25 4608)       Procedures   Procedures     Discussion of Management   Cardiology, Dr. Richard  [facility]   Neurosurgery- Raeann Bishop PA-C, the patient was given Decadron 10 mg IV and she recommends Decadron 4 mg IV every 6 hours starting in the morning.  Admitting Hospitalist-   Luna    ED Course   ED Course as of 04/16/25 1853   Wed Apr 16, 2025   1559 I obtained history and examined the patient as noted above.    1649 I discussed the patient's presentation with Dr. Richard from cardiology.    1714 Spoke with [facility]. They noted a large mass in the right temporal lobe, large amout of vasogenic edema and a 6 mm middline shift    1730 I rechecked and updated the patient and daughter on CT results.   1744 I discussed the patient's presentation with Raeann Liangcomends MRI of brain and 10 decadron IV.        Additional Documentation  None    Medical Decision Making / Diagnosis     CMS Diagnoses: None    MIPS       None    MDM   Pascual Perea is a 84 year old male who arrives to the emergency department due to altered mental status who I found to have a new brain tumor with vasogenic edema and midline shift.  The patient has a history of neuroendocrine tumor treated with radiation and chemotherapy but not surgery.  The cause of this brain tumor is unclear but could be related to his previous neuroendocrine tumor.  I consulted neurosurgery and he was given Decadron 10 mg IV and neurosurgery recommends that the Decadron be continued at 4 mg IV every 6 hours to be resumed in the morning.  He was admitted to the neuro [facility] under the care of the hospitalist service.  I had initially considered the possibility of infection, UTI or sepsis, however there was no sign of UTI or sepsis at this time.  I also considered the possibility of intracranial bleed such as subdural hematoma relating to his recent fall in the differential diagnosis.  His troponin was elevated and ECG shows left bundle branch block with increased ST elevation in the anterior leads compared to his old ECG, however he does not meet Sgarbossa criteria and he does not have any chest pain or shortness of breath.  Spoke with cardiology who also does not feel that his ECG reflects acute MI.  Ultimately I do not  feel there was any sign of acute myocardial infarction.  He has hyperglycemia but no sign of DKA.    Disposition   The patient was admitted to the hospital.     Diagnosis     ICD-10-CM    1. Brain mass  G93.89       2. Vasogenic brain edema (H)  G93.6       3. Altered mental status, unspecified altered mental status type  R41.82            Discharge Medications   New Prescriptions    No medications on file         Scribe Disclosure:  I, Rebekah Joseph, am serving as a scribe at 4:07 PM on 4/16/2025 to document services personally performed by Corrine Sandoval MD based on my observations and the provider's statements to me.        Corrine Sandoval MD  04/16/25 4189

## 2025-04-16 NOTE — PHARMACY-ADMISSION MEDICATION HISTORY
Pharmacist Admission Medication History    Admission medication history is complete. The information provided in this note is only as accurate as the sources available at the time of the update.    Information Source(s): Patient and CareEverywhere/SureScripts via in-person    Pertinent Information: patient unsure of some dosing instructions/frequencies, but states he sets up pill box based on prescribed directions, relied on fill history to confirm current regimen. Family at bedside agree with this. Patient/family unsure if some medications were taken AM vs PM but overall has been compliant with scheduled medications.     Changes made to PTA medication list:  Added: act dry mouth lozenges, blink tears   Deleted: glucose chews (multiple entries) - no current home supply, nitroglycerin tablets (Rx  - from , patient has no current home supply)   Changed: None    Allergies reviewed with patient and updates made in EHR: yes    Medication History Completed By: Miranda Cowart [facility] 2025 6:44 PM    PTA Med List   Medication Sig Last Dose/Taking    Artificial Saliva (ACT DRY MOUTH) LOZG Take 1 lozenge by mouth at bedtime. 4/15/2025 Bedtime    atorvastatin (LIPITOR) 40 MG tablet TAKE 1 TABLET (40 MG) BY MOUTH DAILY Past Week    azelastine 137 MCG/SPRAY SOLN [place] 2 sprays into both nostrils 2 times daily. (Patient taking differently: Spray 2 sprays into both nostrils 2 times daily as needed (runny nose).) Past Week    cyanocobalamin (VITAMIN B-12) 1000 MCG tablet Take 1 tablet (1,000 mcg) by mouth daily. Past Week    ELIQUIS ANTICOAGULANT 5 MG tablet Take 1 tablet by mouth 2 times daily. 4/15/2025 Evening    fenofibrate micronized (LOFIBRA) 67 MG capsule TAKE 1 CAPSULE BY MOUTH EVERY MORNING BEFORE BREAKFAST 4/15/2025 Morning    Insulin Aspart FlexPen 100 UNIT/ML SOPN INJECT UNDER THE SKIN 3 TIMES PER DAY (BEFORE MEALS) 3 UNITS PER CARB CHOICE (15GM) APPROX 10-15 UNITS/MEAL PLUS PRIMING 2025 Morning     insulin glargine (LANTUS SOLOSTAR) 100 UNIT/ML pen Inject 30 Units subcutaneously 2 times daily. 4/16/2025 Morning    isosorbide mononitrate (IMDUR) 30 MG 24 hr tablet Take 1 tablet (30 mg) by mouth daily Past Week    levothyroxine (SYNTHROID/LEVOTHROID) 125 MCG tablet Take 1 tablet (125 mcg) by mouth daily 4/15/2025 Morning    polyethylene glycol 400 (BLINK TEARS) 0.25 % SOLN ophthalmic solution Place 1 drop into both eyes daily. 4/15/2025 Morning

## 2025-04-17 ENCOUNTER — APPOINTMENT (OUTPATIENT)
Dept: OCCUPATIONAL THERAPY | Facility: CLINIC | Age: 85
DRG: 054 | End: 2025-04-17
Attending: NURSE PRACTITIONER
Payer: COMMERCIAL

## 2025-04-17 ENCOUNTER — APPOINTMENT (OUTPATIENT)
Dept: CT IMAGING | Facility: CLINIC | Age: 85
DRG: 054 | End: 2025-04-17
Attending: NURSE PRACTITIONER
Payer: COMMERCIAL

## 2025-04-17 ENCOUNTER — APPOINTMENT (OUTPATIENT)
Dept: CT IMAGING | Facility: CLINIC | Age: 85
DRG: 054 | End: 2025-04-17
Attending: INTERNAL MEDICINE
Payer: COMMERCIAL

## 2025-04-17 LAB
ANION GAP SERPL CALCULATED.3IONS-SCNC: 13 MMOL/L (ref 7–15)
BUN SERPL-MCNC: 24.1 MG/DL (ref 8–23)
CALCIUM SERPL-MCNC: 9.8 MG/DL (ref 8.8–10.4)
CHLORIDE SERPL-SCNC: 104 MMOL/L (ref 98–107)
CREAT SERPL-MCNC: 1.05 MG/DL (ref 0.67–1.17)
EGFRCR SERPLBLD CKD-EPI 2021: 70 ML/MIN/1.73M2
GLUCOSE BLDC GLUCOMTR-MCNC: 260 MG/DL (ref 70–99)
GLUCOSE BLDC GLUCOMTR-MCNC: 268 MG/DL (ref 70–99)
GLUCOSE BLDC GLUCOMTR-MCNC: 284 MG/DL (ref 70–99)
GLUCOSE BLDC GLUCOMTR-MCNC: 374 MG/DL (ref 70–99)
GLUCOSE BLDC GLUCOMTR-MCNC: 388 MG/DL (ref 70–99)
GLUCOSE BLDC GLUCOMTR-MCNC: 419 MG/DL (ref 70–99)
GLUCOSE SERPL-MCNC: 260 MG/DL (ref 70–99)
HCO3 SERPL-SCNC: 21 MMOL/L (ref 22–29)
POTASSIUM SERPL-SCNC: 4.3 MMOL/L (ref 3.4–5.3)
SODIUM SERPL-SCNC: 138 MMOL/L (ref 135–145)

## 2025-04-17 PROCEDURE — 250N000009 HC RX 250: Performed by: NURSE PRACTITIONER

## 2025-04-17 PROCEDURE — 250N000011 HC RX IP 250 OP 636: Performed by: NURSE PRACTITIONER

## 2025-04-17 PROCEDURE — 97165 OT EVAL LOW COMPLEX 30 MIN: CPT | Mod: GO

## 2025-04-17 PROCEDURE — 250N000011 HC RX IP 250 OP 636: Performed by: INTERNAL MEDICINE

## 2025-04-17 PROCEDURE — 258N000003 HC RX IP 258 OP 636: Performed by: INTERNAL MEDICINE

## 2025-04-17 PROCEDURE — 36415 COLL VENOUS BLD VENIPUNCTURE: CPT | Performed by: INTERNAL MEDICINE

## 2025-04-17 PROCEDURE — 250N000013 HC RX MED GY IP 250 OP 250 PS 637: Performed by: INTERNAL MEDICINE

## 2025-04-17 PROCEDURE — 99233 SBSQ HOSP IP/OBS HIGH 50: CPT | Performed by: HOSPITALIST

## 2025-04-17 PROCEDURE — 74177 CT ABD & PELVIS W/CONTRAST: CPT

## 2025-04-17 PROCEDURE — 250N000013 HC RX MED GY IP 250 OP 250 PS 637: Performed by: NURSE PRACTITIONER

## 2025-04-17 PROCEDURE — 250N000012 HC RX MED GY IP 250 OP 636 PS 637: Performed by: INTERNAL MEDICINE

## 2025-04-17 PROCEDURE — 97535 SELF CARE MNGMENT TRAINING: CPT | Mod: GO

## 2025-04-17 PROCEDURE — 70496 CT ANGIOGRAPHY HEAD: CPT

## 2025-04-17 PROCEDURE — 80048 BASIC METABOLIC PNL TOTAL CA: CPT | Performed by: INTERNAL MEDICINE

## 2025-04-17 PROCEDURE — 99223 1ST HOSP IP/OBS HIGH 75: CPT | Performed by: INTERNAL MEDICINE

## 2025-04-17 PROCEDURE — 70491 CT SOFT TISSUE NECK W/DYE: CPT

## 2025-04-17 PROCEDURE — 120N000013 HC R&B IMCU

## 2025-04-17 RX ORDER — HYDRALAZINE HYDROCHLORIDE 10 MG/1
10 TABLET, FILM COATED ORAL EVERY 4 HOURS PRN
Status: CANCELLED | OUTPATIENT
Start: 2025-04-17

## 2025-04-17 RX ORDER — LIDOCAINE 40 MG/G
CREAM TOPICAL
Status: DISCONTINUED | OUTPATIENT
Start: 2025-04-17 | End: 2025-04-17

## 2025-04-17 RX ORDER — NICOTINE POLACRILEX 4 MG
15-30 LOZENGE BUCCAL
Status: DISCONTINUED | OUTPATIENT
Start: 2025-04-17 | End: 2025-04-17

## 2025-04-17 RX ORDER — FENOFIBRATE 54 MG/1
54 TABLET ORAL
Status: DISCONTINUED | OUTPATIENT
Start: 2025-04-17 | End: 2025-04-18 | Stop reason: HOSPADM

## 2025-04-17 RX ORDER — LEVOTHYROXINE SODIUM 125 UG/1
125 TABLET ORAL DAILY
Status: DISCONTINUED | OUTPATIENT
Start: 2025-04-17 | End: 2025-04-18 | Stop reason: HOSPADM

## 2025-04-17 RX ORDER — SODIUM CHLORIDE 9 MG/ML
INJECTION, SOLUTION INTRAVENOUS CONTINUOUS
Status: DISCONTINUED | OUTPATIENT
Start: 2025-04-17 | End: 2025-04-17

## 2025-04-17 RX ORDER — NICOTINE POLACRILEX 4 MG
15-30 LOZENGE BUCCAL
Status: DISCONTINUED | OUTPATIENT
Start: 2025-04-17 | End: 2025-04-18 | Stop reason: HOSPADM

## 2025-04-17 RX ORDER — POLYETHYLENE GLYCOL 3350 17 G/17G
17 POWDER, FOR SOLUTION ORAL 2 TIMES DAILY PRN
Status: DISCONTINUED | OUTPATIENT
Start: 2025-04-17 | End: 2025-04-18 | Stop reason: HOSPADM

## 2025-04-17 RX ORDER — LIDOCAINE 40 MG/G
CREAM TOPICAL
Status: DISCONTINUED | OUTPATIENT
Start: 2025-04-17 | End: 2025-04-18 | Stop reason: HOSPADM

## 2025-04-17 RX ORDER — ATORVASTATIN CALCIUM 40 MG/1
40 TABLET, FILM COATED ORAL DAILY
Status: DISCONTINUED | OUTPATIENT
Start: 2025-04-17 | End: 2025-04-18 | Stop reason: HOSPADM

## 2025-04-17 RX ORDER — ONDANSETRON 2 MG/ML
4 INJECTION INTRAMUSCULAR; INTRAVENOUS EVERY 6 HOURS PRN
Status: DISCONTINUED | OUTPATIENT
Start: 2025-04-17 | End: 2025-04-18 | Stop reason: HOSPADM

## 2025-04-17 RX ORDER — AMOXICILLIN 250 MG
1 CAPSULE ORAL 2 TIMES DAILY PRN
Status: DISCONTINUED | OUTPATIENT
Start: 2025-04-17 | End: 2025-04-18 | Stop reason: HOSPADM

## 2025-04-17 RX ORDER — DEXTROSE MONOHYDRATE 25 G/50ML
25-50 INJECTION, SOLUTION INTRAVENOUS
Status: DISCONTINUED | OUTPATIENT
Start: 2025-04-17 | End: 2025-04-17

## 2025-04-17 RX ORDER — DEXAMETHASONE SODIUM PHOSPHATE 4 MG/ML
4 INJECTION, SOLUTION INTRA-ARTICULAR; INTRALESIONAL; INTRAMUSCULAR; INTRAVENOUS; SOFT TISSUE EVERY 6 HOURS
Status: DISCONTINUED | OUTPATIENT
Start: 2025-04-17 | End: 2025-04-18

## 2025-04-17 RX ORDER — AMOXICILLIN 250 MG
2 CAPSULE ORAL 2 TIMES DAILY PRN
Status: DISCONTINUED | OUTPATIENT
Start: 2025-04-17 | End: 2025-04-18 | Stop reason: HOSPADM

## 2025-04-17 RX ORDER — ACETAMINOPHEN 650 MG/1
650 SUPPOSITORY RECTAL EVERY 4 HOURS PRN
Status: DISCONTINUED | OUTPATIENT
Start: 2025-04-17 | End: 2025-04-18 | Stop reason: HOSPADM

## 2025-04-17 RX ORDER — HYDRALAZINE HYDROCHLORIDE 20 MG/ML
10-20 INJECTION INTRAMUSCULAR; INTRAVENOUS EVERY 30 MIN PRN
Status: DISCONTINUED | OUTPATIENT
Start: 2025-04-17 | End: 2025-04-18 | Stop reason: HOSPADM

## 2025-04-17 RX ORDER — ACETAMINOPHEN 325 MG/1
650 TABLET ORAL EVERY 4 HOURS PRN
Status: DISCONTINUED | OUTPATIENT
Start: 2025-04-17 | End: 2025-04-18 | Stop reason: HOSPADM

## 2025-04-17 RX ORDER — IOPAMIDOL 755 MG/ML
125 INJECTION, SOLUTION INTRAVASCULAR ONCE
Status: COMPLETED | OUTPATIENT
Start: 2025-04-17 | End: 2025-04-17

## 2025-04-17 RX ORDER — PROCHLORPERAZINE MALEATE 5 MG/1
5 TABLET ORAL EVERY 6 HOURS PRN
Status: DISCONTINUED | OUTPATIENT
Start: 2025-04-17 | End: 2025-04-18 | Stop reason: HOSPADM

## 2025-04-17 RX ORDER — BISACODYL 10 MG
10 SUPPOSITORY, RECTAL RECTAL DAILY PRN
Status: DISCONTINUED | OUTPATIENT
Start: 2025-04-17 | End: 2025-04-18 | Stop reason: HOSPADM

## 2025-04-17 RX ORDER — HYDRALAZINE HYDROCHLORIDE 20 MG/ML
10 INJECTION INTRAMUSCULAR; INTRAVENOUS EVERY 4 HOURS PRN
Status: CANCELLED | OUTPATIENT
Start: 2025-04-17

## 2025-04-17 RX ORDER — ONDANSETRON 4 MG/1
4 TABLET, ORALLY DISINTEGRATING ORAL EVERY 6 HOURS PRN
Status: DISCONTINUED | OUTPATIENT
Start: 2025-04-17 | End: 2025-04-18 | Stop reason: HOSPADM

## 2025-04-17 RX ORDER — LABETALOL HYDROCHLORIDE 5 MG/ML
10-20 INJECTION, SOLUTION INTRAVENOUS EVERY 10 MIN PRN
Status: DISCONTINUED | OUTPATIENT
Start: 2025-04-17 | End: 2025-04-18 | Stop reason: HOSPADM

## 2025-04-17 RX ORDER — DEXTROSE MONOHYDRATE 25 G/50ML
25-50 INJECTION, SOLUTION INTRAVENOUS
Status: DISCONTINUED | OUTPATIENT
Start: 2025-04-17 | End: 2025-04-18 | Stop reason: HOSPADM

## 2025-04-17 RX ORDER — LEVETIRACETAM 500 MG/1
500 TABLET ORAL 2 TIMES DAILY
Status: DISCONTINUED | OUTPATIENT
Start: 2025-04-17 | End: 2025-04-18 | Stop reason: HOSPADM

## 2025-04-17 RX ORDER — ISOSORBIDE MONONITRATE 30 MG/1
30 TABLET, EXTENDED RELEASE ORAL DAILY
Status: DISCONTINUED | OUTPATIENT
Start: 2025-04-17 | End: 2025-04-18 | Stop reason: HOSPADM

## 2025-04-17 RX ADMIN — INSULIN ASPART 6 UNITS: 100 INJECTION, SOLUTION INTRAVENOUS; SUBCUTANEOUS at 14:45

## 2025-04-17 RX ADMIN — LEVOTHYROXINE SODIUM 125 MCG: 125 TABLET ORAL at 06:37

## 2025-04-17 RX ADMIN — ISOSORBIDE MONONITRATE 30 MG: 30 TABLET, EXTENDED RELEASE ORAL at 08:13

## 2025-04-17 RX ADMIN — DEXAMETHASONE SODIUM PHOSPHATE 4 MG: 4 INJECTION, SOLUTION INTRAMUSCULAR; INTRAVENOUS at 17:22

## 2025-04-17 RX ADMIN — DEXAMETHASONE SODIUM PHOSPHATE 4 MG: 4 INJECTION, SOLUTION INTRAMUSCULAR; INTRAVENOUS at 23:54

## 2025-04-17 RX ADMIN — INSULIN ASPART 5 UNITS: 100 INJECTION, SOLUTION INTRAVENOUS; SUBCUTANEOUS at 10:43

## 2025-04-17 RX ADMIN — SODIUM CHLORIDE: 9 INJECTION, SOLUTION INTRAVENOUS at 02:32

## 2025-04-17 RX ADMIN — INSULIN ASPART 10 UNITS: 100 INJECTION, SOLUTION INTRAVENOUS; SUBCUTANEOUS at 02:32

## 2025-04-17 RX ADMIN — FENOFIBRATE 54 MG: 54 TABLET ORAL at 08:14

## 2025-04-17 RX ADMIN — DEXAMETHASONE SODIUM PHOSPHATE 4 MG: 4 INJECTION, SOLUTION INTRAMUSCULAR; INTRAVENOUS at 06:37

## 2025-04-17 RX ADMIN — DEXAMETHASONE SODIUM PHOSPHATE 4 MG: 4 INJECTION, SOLUTION INTRAMUSCULAR; INTRAVENOUS at 11:48

## 2025-04-17 RX ADMIN — IOPAMIDOL 125 ML: 755 INJECTION, SOLUTION INTRAVENOUS at 13:56

## 2025-04-17 RX ADMIN — LEVETIRACETAM 500 MG: 500 TABLET, FILM COATED ORAL at 12:24

## 2025-04-17 RX ADMIN — INSULIN ASPART 6 UNITS: 100 INJECTION, SOLUTION INTRAVENOUS; SUBCUTANEOUS at 06:38

## 2025-04-17 RX ADMIN — LEVETIRACETAM 500 MG: 500 TABLET, FILM COATED ORAL at 20:03

## 2025-04-17 RX ADMIN — ATORVASTATIN CALCIUM 40 MG: 40 TABLET, FILM COATED ORAL at 08:14

## 2025-04-17 RX ADMIN — SODIUM CHLORIDE 70 ML: 9 INJECTION, SOLUTION INTRAVENOUS at 13:56

## 2025-04-17 ASSESSMENT — ACTIVITIES OF DAILY LIVING (ADL)
ADLS_ACUITY_SCORE: 59
ADLS_ACUITY_SCORE: 59
USE_OF_HEARING_ASSISTIVE_DEVICES: BILATERAL HEARING AIDS
ADLS_ACUITY_SCORE: 39
ADLS_ACUITY_SCORE: 59
HEARING_DIFFICULTY_OR_DEAF: YES
ADLS_ACUITY_SCORE: 39
ADLS_ACUITY_SCORE: 59
ADLS_ACUITY_SCORE: 59
TOILETING_ISSUES: NO
DIFFICULTY_COMMUNICATING: NO
ADLS_ACUITY_SCORE: 57
FALL_HISTORY_WITHIN_LAST_SIX_MONTHS: NO
DESCRIBE_HEARING_LOSS: BILATERAL HEARING LOSS
WERE_AUXILIARY_AIDS_OFFERED?: NO
DIFFICULTY_EATING/SWALLOWING: NO
ADLS_ACUITY_SCORE: 59
ADLS_ACUITY_SCORE: 39
ADLS_ACUITY_SCORE: 59
ADLS_ACUITY_SCORE: 59
DOING_ERRANDS_INDEPENDENTLY_DIFFICULTY: NO
ADLS_ACUITY_SCORE: 59
WEAR_GLASSES_OR_BLIND: YES
ADLS_ACUITY_SCORE: 59
ADLS_ACUITY_SCORE: 59
PATIENT'S_PREFERRED_MEANS_OF_COMMUNICATION: ENGLISH SPEAKER WITH HEARING LOSS, NO SPEECH PROBLEMS.
WALKING_OR_CLIMBING_STAIRS_DIFFICULTY: NO
CHANGE_IN_FUNCTIONAL_STATUS_SINCE_ONSET_OF_CURRENT_ILLNESS/INJURY: YES
ADLS_ACUITY_SCORE: 41
DRESSING/BATHING_DIFFICULTY: NO
ADLS_ACUITY_SCORE: 57
ADLS_ACUITY_SCORE: 59
CONCENTRATING,_REMEMBERING_OR_MAKING_DECISIONS_DIFFICULTY: NO
ADLS_ACUITY_SCORE: 41
ADLS_ACUITY_SCORE: 59
ADLS_ACUITY_SCORE: 59

## 2025-04-17 NOTE — PROGRESS NOTES
"   04/17/25 1500   Appointment Info   Signing Clinician's Name / Credentials (OT) Bri Keating, OTD, OTR/L   Living Environment   People in Home child(sharita), adult  (son)   Current Living Arrangements apartment  (senior living)   Home Accessibility no concerns  (elevator)   Transportation Anticipated family or friend will provide   Living Environment Comments Pt resides with adult son. Shares IADLs with son. Ambulates with SPC all distances. BR set up: Walk in shower, tall toilet, shower chair if needed   Self-Care   Usual Activity Tolerance good   Current Activity Tolerance moderate   Equipment Currently Used at Home cane, straight;shower chair   Activity/Exercise/Self-Care Comment Pt is ind with ADLs at baseline.   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 Admitted on 4/16/2025.\"   Existing Precautions/Restrictions fall   Limitations/Impairments safety/cognitive;hearing  (bilat hearing aids)   General Observations and Info SBP< 150; Per chart, \"surgical resection, tentatively planning for early next week\"; Mild [place]   Cognitive Status Examination   Orientation Status orientation to person, place and time   Cognitive Status Comments will continue to monitor   Visual Perception   Visual Impairment/Limitations WFL;corrective lenses for reading   Impact of Vision Impairment on Function (Vision) Pt reports he " just saw eye doctor on 4/9/25. Denies any changes.   Sensory   Sensory Comments Pt does not report any sensory changes   Pain Assessment   Patient Currently in Pain No   Range of Motion Comprehensive   Comment, General Range of Motion BUEs WFL   Strength Comprehensive (MMT)   Comment, General Manual Muscle Testing (MMT) Assessment BUEs WFL, appear equal   Coordination   Upper Extremity Coordination No deficits were identified   Bed Mobility   Comment (Bed Mobility) NT - pt seated in chair at time of OT session   Transfers   Transfers sit-stand transfer;toilet transfer   Sit-Stand Transfer   Sit/Stand Transfer Comments SBA   Toilet Transfer   Toilet Transfer Comments SBA   Balance   Balance Comments No overt LOB noted with use of SPC   Activities of Daily Living   BADL Assessment/Intervention lower body dressing;toileting;grooming   Lower Body Dressing Assessment/Training   Comment, (Lower Body Dressing) SBA B shoes   Grooming Assessment/Training   Comment, (Grooming) SBA   Toileting   Comment, (Toileting) SBA   Clinical Impression   Criteria for Skilled Therapeutic Interventions Met (OT) Yes, treatment indicated   OT Diagnosis Decreased independence with I/ADLs   OT Problem List-Impairments impacting ADL problems related to;activity tolerance impaired;cognition;mobility   Assessment of Occupational Performance 1-3 Performance Deficits   Identified Performance Deficits dressing, bathing, toileting, IADLs   Planned Therapy Interventions (OT) ADL retraining;IADL retraining;cognition;transfer training   Clinical Decision Making Complexity (OT) problem focused assessment/low complexity   Risk & Benefits of therapy have been explained patient   OT Total Evaluation Time   OT Eval, Low Complexity Minutes (16904) 10   OT Goals   Therapy Frequency (OT) 6 times/week   OT Predicted Duration/Target Date for Goal Attainment 04/30/25   OT Goals Hygiene/Grooming;Lower Body Dressing;Toilet Transfer/Toileting;Cognition   OT:  Hygiene/Grooming modified independent;while standing  (SPC)   OT: Lower Body Dressing Modified independent;including set-up/clothing retrieval   OT: Toilet Transfer/Toileting Modified independent;toilet transfer;cleaning and garment management  (SPC)   OT: Cognitive Patient/caregiver will verbalize understanding of cognitive assessment results/recommendations as needed for safe discharge planning   OT: Functional/aerobic ambulation tolerance with stable cardiovascular response in order to return to home and community environment Modified independent;Greater than 300 feet;Straight cane  (SPC)   OT: Goal 1 Pt will complete simulated IADL task with Mod I and SPC for incr safety/ind.   Self-Care/Home Management   Self-Care/Home Mgmt/ADL, Compensatory, Meal Prep Minutes (80177) 17   Symptoms Noted During/After Treatment (Meal Preparation/Planning Training) fatigue   Treatment Detail/Skilled Intervention Pt greeted in chair, VSS throughout session, BP stable at end of session and within parameters. Pt completes sit > stand with SBA, SPC and ambulates to BR with same level of assist,  assist with lines. Pt sits on toilet with SBA, use of grab bar. Pt voids with SBA, able to demo reach around for mikey-cares. Pt completes underwear change sitting on toilet with SBA and VC, able to pull up in standing. Pt ambulates to sink for 2 gh tasks, hand hygiene and brushes teeth with SBA, VC to inidicate toothpaste fell off toothbrush into sink. Pt ambulates to chair and sits in chair with SBA, SPC. Pt dons B shoes with set up A. Pt seated in chair at end of session with needs met, items in reach, alarm set, RN updated.   OT Discharge Planning   OT Plan activity tolerance, dual-tasking, dressing with gather, IADL task, monitor cog   OT Discharge Recommendation (DC Rec) home with assist;home with outpatient occupational therapy   OT Rationale for DC Rec Pt currently functioning below baseline, mild decr activity kend[place], mobility, impacting  safety/independence within I/ADLs. Pt at baseline resides with adult son and shares IADLs with son. Currently, pt completes mobility/self cares with SPC and SBA. Anticipate with continued IP OT, pt will progress for discharge home with OP OT for IADLs, once medically ready. OT will continue to follow.   OT Brief overview of current status Goals of therapy will be to address safe mobility and make recs for d/c to next level of care. Pt and RN will continue to follow all falls risk precautions as documented by RN staff while hospitalized  (SBA SPC)   OT Total Distance Amb During Session (feet) 30   OT Equipment Needed at Discharge   (TBD)   Total Session Time   Timed Code Treatment Minutes 17   Total Session Time (sum of timed and untimed services) 27

## 2025-04-17 NOTE — PROGRESS NOTES
Reason for Admission: Brain mass with hemorrhage and slight shift  Cognitive/Mentation: A/Ox 4  Neuros/CMS: Stable w/ gen weakness, [place]  VS: VSS on RA. Sbp<150   Tele: afib.  GI/: BS clear, + flatus, last BM PTA. Continent.  Pulmonary: LS clear.  Pain: denies.   Drains/Lines: PIV infusing NS @ 75 ML/HR   Skin: intact, some skin tags on the back  Activity: Assist x 1 with cane.  Diet: NPO  Therapies recs: pending  Discharge: pending  Aggression Stoplight Tool: green  End of shift summary: neurosurg/ heme-onc consult

## 2025-04-17 NOTE — PLAN OF CARE
Goal Outcome Evaluation:      Plan of Care Reviewed With: patient    Overall Patient Progress: improvingOverall Patient Progress: improving         Here with generalized weakness, new right temporal brain mass with shift. Neuros stable. Slightly forgetful, [place]. Tele SR, clement at times with PAC/PVCs. Denies pain. Sliding scale insulin for elevated blood glucose. Regular diet, meds whole with water. Continent, up to bathroom with SBA, gait belt, cane. CT CAP & neck done. Surgery to be scheduled next week. Discharge plans pending.

## 2025-04-17 NOTE — H&P (VIEW-ONLY)
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     PLAN:  -Patient is interested in surgical resection, tentatively planning for early next week.   -Continue Q2hr neurochecks  -Continue to hold Eliquis and all anticoagulation   -CTA Head Neck w/contrast  -HemOnc following, CT neck soft tissue and CT CAP ordered  -Decadron 4mg IV Q6hr   -Keppra 500 mg BID   -SBP < 150  -PT/OT eval    Plan reviewed with Dr. Erickson.    I spent 25 minutes providing care for this patient.     Corinne Fanta MSN, AGACNP-[facility] Neurosurgery  [facility]  Text page via [employer] Paging/Directory    Physical Exam   /63 (BP Location: Left arm)   Pulse 54   Temp 98.1  F (36.7  C) (Oral)   Resp 14   Ht 1.803 m (5' 11\")   Wt 83.9 kg (185 lb)   SpO2 96%   BMI 25.80 kg/m       Mental status:  Alert and oriented x 3, speech is fluent, following commands. Able to name three objects.   Cranial nerves:  II-XII grossly intact.   Motor:  Moves all extremities.     UE muscle strength  Right  Left    Deltoid  5/5  5/5    Biceps  5/5  5-/5    Triceps  5/5  5-/5    Hand grasp  5/5  4+/5      LE muscle strength  Right  Left    Iliopsoas (hip flexion)  5/5  5/5    Quad (knee extension)  5/5  5/5    Hamstring (knee flexion)  5/5  5/5    Gastrocnemius (PF)  5/5  5/5    Tibialis Ant. (DF)  5/5  5/5      Sensation:  Intact to light touch throughout BUE BLE  Coordination:  Slight left upper extremity drift.   "

## 2025-04-17 NOTE — PROGRESS NOTES
Contacted regarding 85 yo male anti-coagulated on Eliquis with hx of high grade neuroendocrine carcinoma presenting to ER with generalized weakness over the last two weeks.    Imaging reveals large right temporal lobe mass with associated edema, hemorrhage and shift.    Imaging:    Head CT:   IMPRESSION:  1.  Large mass in the right anterior temporal lobe measures 5.3 x 4 x 3.8 cm. Associated hemorrhage within the posterior (cystic) aspect of this mass.  2.  Significant surrounding vasogenic edema.  3.  Right to left midline shift 6.6 mm.  4.  Recommend MRI without and with intravenous contrast.    RECOMMENDATIONS:  Brain MRI with and without contrast with stealth protocol.  Decadron 10 mg IV now.  Hold Eliquis  NS will make final recommendations after MRI.    Discussed with Dr. Judge.    Raeann Bishop, MPAS  [facility] Neurosurgery  [address] [address]    Tel [phone]  Pager [phone]

## 2025-04-17 NOTE — CONSULTS
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     ASSESSMENT AND RECOMMENDATIONS:      Pascual Perea is a 84 year old patient with  a-fib on eliquis, CKD2, DM2, + high grade high-grade neuroendocrine carcinoma, large cell type, involving left parotid gland and left cervical lymph nodes treated with Carbo-Etop and Radiaition Aug-October 2024 and presenting with a large 5.3 c, right temporal lone mass.    MRI brain 4/16/2025: Large mass in the right temporal lobe measures 5.3 x 3.8 x 4.3 cm (AP, TR, CC). The lesion demonstrates irregular heterogenous enhancement, intermediate signal on T1-weighted images, solid anterior component, cystic smaller   posterior hemorrhage fluid level. Extensive surrounding vasogenic edema. Right-to-left midline shift, 4 to 5 mm.       ONCOLOGY HISTORY:     High-grade neuroendocrine carcinoma, large cell type, involving left parotid gland and left cervical lymph nodes.     1.  CT neck on 06/20/2024 revealed 2.8 cm mass in left parotid gland.  Also revealed multiple enlarged left-sided cervical lymph node.  2.  Ultrasound-guided biopsy of left cervical mass on 06/28/2024 revealed METASTATIC HIGH-GRADE NEUROENDOCRINE CARCINOMA, LARGE CELL TYPE, at least 1.2 cm in linear extent, extending to biopsy margins.  3.  PET scan on 07/19/2024 revealed hypermetabolic left parotid mass and multiple hypermetabolic left cervical lymphadenopathy.  Severely stenotic left internal jugular vein secondary to mass effect.  4. He was seen by ENT at [facility].  Flexible fiberoptic laryngoscopy on 07/15/2024 was normal.  5. Case discussed at tumor board at [facility].  Tumor board recommendation was to treat it as small cell lung cancer.  Recommendation was to give him 4 cycle of platinum and  etoposide and add radiation with cycle 2 or 3.  6. Carboplatin and etoposide x 4 cycles between 08/14/2024 and 10/18/2024 (carboplatin chosen because of CKD, hearing loss and his age).  -Radiation to left parotid between 09/09/2024 and 10/21/2024.  6000 cGy in 30 fractions.     Recurrence with brain Metastasis  MRI 4/16/2025: Large mass in the right temporal lobe measures 5.3 x 3.8 x 4.3 cm (AP, TR, CC). The lesion demonstrates irregular heterogenous enhancement, intermediate signal on T1-weighted images, solid anterior component, cystic smaller posterior hemorrhage fluid level. Extensive surrounding vasogenic edema. Right-to-left midline shift, 4 to 5 mm.       Plan  Ct scan Neck soft tissue + C/A/P with contrast to evaluate extent of visceral progression  Agree with HOLDing Eliquis  Decadron for the edema currently at 4 mg Q6 hours  Neurosurgery consulted for resection which is a reasonable option in a patient with a solitary brain mass and good performance status. Radiation would be considered in the future after resection or in lieu of resection for patients that are not surgical candidates.     HPI  Mr. 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 It was treated as limited stage  small cell lung cancer with concurrent chemoradiation. He completed 4 cycle of carboplatin and etoposide on 10/18/2024.  He completed radiation on 10/21/2024.       PET scan on 12/23/2024:  1. Findings of a complete metabolic response. No residual soft tissue component of the primary left parotid mass with no remaining enlarged cervical lymph nodes by short axis criteria.  2a. New groundglass nodularity in the lung apices suspicious for an infectious/inflammatory process.  2b. Stable appearance of several mildly enlarged mediastinal nodes with low level uptake, possibly reactive.     Patient was doing well.  He had mild  "generalized weakness but was able to do all his activities.  No headache.  No dizziness.  Occasional pain in the throat.  No problem swallowing.  No neck pain.     He presented to ER on 4/16/2025 for evaluation of generalized weakness. Patient reports generalized weakness and fatigue over the past two weeks. He states he has been sleeping all day. During this time period he has difficulty ambulating due to his legs feeling weak, and has been using a cane. Also has had wording finding difficulty over the past 2 weeks. He also states his feet have been bothersome, he saw his podiatrist last week for this.  He has a headache currently. Denies any pain.   He reports falling a few months ago, did not hit his head. He lives with his son.      Review of Systems: See interval hx. Denies fevers, chills, HA, dizziness, n/t, changes in vision, cough, CP, SOB, abdominal pain, N/V, diarrhea, changes in urination, bleeding, bruising, rash.     +ve sore throat, falls, weakness and less stability with gait.       Medical history  Adhesive capsulitis of shoulder  Anemia  Atrial fibrillation  Arrhythmia  CAD    Gastroparesis  GERD  Hyperlipidemia    Hypertension  Hypothyroidism  Hyperparathyroidism  BRANDON    Osteopenia  CKD stage 3  Pulmonary hypertension    Sensorineural hearing loss  Stented coronary artery  Steatosis of liver  Type 2 diabetes   High grade neuroendocrine carcinoma 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   Medications:  No current outpatient medications on file.       Allergies   Allergen Reactions    Omeprazole      diarrhea    Pantoprazole      diarrhea         EXAM:    /63 (BP Location: Left arm)   Pulse 54   Temp 98.1  F (36.7  C) (Oral)   Resp 14   Ht 1.803 m (5' 11\")   Wt 83.9 kg (185 lb)   SpO2 96%   BMI 25.80 kg/m      GENERAL: Elderly male, in no acute distress.  Alert and oriented x3.   HEENT:  Normocephalic, atraumatic.  PERRL, oropharynx clear with no sores " or thrush.   LYMPH NODES:  No palpable pre/post-auricular, cervical, axillary lymphadenopathy appreciated.  LUNGS:  Clear to auscultation bilaterally.   ABDOMEN:  Soft, nontender and nondistended.    EXTREMITIES:  No clubbing, cyanosis, or edema.   SKIN: No rash, pus or tenderness.   PSYCH: Calm and cooperative   Strength 4-5/5 in the left upper arm otherwise intact.       Labs: CBC RESULTS:   Recent Labs   Lab Test 04/16/25  1344   WBC 5.4   RBC 4.17*   HGB 12.7*   HCT 37.0*   MCV 89   MCH 30.5   MCHC 34.3   RDW 14.2   *        80 minutes spent on the date of the encounter doing chart review, review of test results, interpretation of tests, patient visit, and documentation     The longitudinal plan of care for the diagnosis(es)/condition(s) as documented were addressed during this visit. Due to the added complexity in care, I will continue to support [facility] in the subsequent management and with ongoing continuity of care.    Chong Espino  Hematology/Oncology  [facility]

## 2025-04-17 NOTE — PROGRESS NOTES
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     PLAN:  -Patient is interested in surgical resection, tentatively planning for early next week.   -Continue Q2hr neurochecks  -Continue to hold Eliquis and all anticoagulation   -CTA Head Neck w/contrast  -HemOnc following, CT neck soft tissue and CT CAP ordered  -Decadron 4mg IV Q6hr   -Keppra 500 mg BID   -SBP < 150  -PT/OT eval    Plan reviewed with Dr. Erickson.    I spent 25 minutes providing care for this patient.     Corinne Fanta MSN, AGACNP-[facility] Neurosurgery  [facility]  Text page via [employer] Paging/Directory    Physical Exam   /63 (BP Location: Left arm)   Pulse 54   Temp 98.1  F (36.7  C) (Oral)   Resp 14   Ht 1.803 m (5' 11\")   Wt 83.9 kg (185 lb)   SpO2 96%   BMI 25.80 kg/m       Mental status:  Alert and oriented x 3, speech is fluent, following commands. Able to name three objects.   Cranial nerves:  II-XII grossly intact.   Motor:  Moves all extremities.     UE muscle strength  Right  Left    Deltoid  5/5  5/5    Biceps  5/5  5-/5    Triceps  5/5  5-/5    Hand grasp  5/5  4+/5      LE muscle strength  Right  Left    Iliopsoas (hip flexion)  5/5  5/5    Quad (knee extension)  5/5  5/5    Hamstring (knee flexion)  5/5  5/5    Gastrocnemius (PF)  5/5  5/5    Tibialis Ant. (DF)  5/5  5/5      Sensation:  Intact to light touch throughout BUE BLE  Coordination:  Slight left upper extremity drift.   "

## 2025-04-17 NOTE — ED NOTES
"[facility]  ED Nurse Handoff Report    ED Chief complaint: Generalized Weakness      ED Diagnosis:   Final diagnoses:   None       Code Status: see admitting MD    Allergies:   Allergies   Allergen Reactions    Omeprazole      diarrhea    Pantoprazole      diarrhea       Patient Story: Patient with generalized weakness worsening for several days, son reports patient with blood glucose level higher than before and sleeps a lot.     Focused Assessment:  patient is alert and orientated but very tired and sleepy.    Treatments and/or interventions provided: CT head, MRI brain, EKG, labs, chest XR, see MAR for medications  Patient's response to treatments and/or interventions: good    To be done/followed up on inpatient unit:  see orders    Does this patient have any cognitive concerns?:  none    Activity level - Baseline/Home:  Independent  Activity Level - Current:   Stand with Assist    Patient's Preferred language: English   Needed?: No    Isolation: None  Infection: Not Applicable  Patient tested for COVID 19 prior to admission: NO  Bariatric?: No    Vital Signs:   Vitals:    04/16/25 1339 04/16/25 1700 04/16/25 1702 04/16/25 1715   BP: 133/79 (!) 155/62  (!) 150/53   Pulse: 81 53  53   Resp: 16 12  10   Temp: 97.4  F (36.3  C)      TempSrc: Temporal      SpO2: 97%  96%    Weight: 83.9 kg (185 lb)      Height: 1.803 m (5' 11\")          Cardiac Rhythm:     Was the PSS-3 completed:   Yes  What interventions are required if any?               Family Comments: son and daughter notified  OBS brochure/video discussed/provided to patient/family: No              Name of person given brochure if not patient: na              Relationship to patient: na    For the majority of the shift this patient's behavior was Green.   Behavioral interventions performed were none.    ED NURSE PHONE NUMBER: [phone]        "

## 2025-04-17 NOTE — CONSULTS
Neurosurgery Consult    Assessment  83 yo male anti-coagulated on Eliquis with history of high grade neuroendocrine carcinoma presenting with fatigue and brain MRI revealing large right temporal lesion with edema and shift.    Plan:  Admit to neuro floor.  Every 2 hour neuro checks.  Hold Eliquis.  Decadron 4 mg IV every 6 hours (was given 10 mg on presentation)  BP less than 150 systolic.  Okay to eat now, NPO after midnight until surgical plan is solidified tomorrow.    HPI  Pascual Perea is a 84 year old male on Eliquis for a-fib with a past medical history significant for type 2 diabetes, CAD, hypertension, stage 3 CKD and high grade neuroendocrine carcinoma 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 treated with radiation and chemotherapy for just over a month last fall for neuroendocrine carcinoma.  Presenting to ER for evaluation of generalized weakness. Patient reports generalized weakness and fatigue over the past two weeks. He states he has been sleeping all day. During this time period he has difficulty ambulating due to his legs feeling weak, and has been using a cane. Also has had wording finding difficulty over the past 2 weeks. He also states his feet have been bothersome, he saw his podiatrist last week for this.  He has a headache currently. Denies any pain.   He reports falling a few months ago, did not hit his head. He lives with his son.     Medical history  Adhesive capsulitis of shoulder  Anemia  Atrial fibrillation  Arrhythmia  CAD    Gastroparesis  GERD  Hyperlipidemia    Hypertension  Hypothyroidism  Hyperparathyroidism  BRANDON    Osteopenia  CKD stage 3  Pulmonary hypertension    Sensorineural hearing loss  Stented coronary artery  Steatosis of liver  Type 2 diabetes   High grade neuroendocrine carcinoma 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      Exam  B/P: 150/53, T: 97.4, P: 53, R: 10   Awake and alert, able to move all  four extremities well with slight let upper extremity drift.  Slight left triceps weakness but otherwise intact strength     Platelet count 133 (86k on 2/24/24)    Imaging    Head CT:  IMPRESSION:  1.  Large mass in the right anterior temporal lobe measures 5.3 x 4 x 3.8 cm. Associated hemorrhage within the posterior (cystic) aspect of this mass.  2.  Significant surrounding vasogenic edema.  3.  Right to left midline shift 6.6 mm.  4.  Recommend MRI without and with intravenous contrast.    Brain MRI:    IMPRESSION:  1.  Large mass right temporal lobe with extensive surrounding vasogenic edema, measures 5.3 x 3.8 x 4.3 cm. Hemorrhagic signal within the mass.  2.  Right-to-left midline shift 4 to 5 mm.    Discussed with Dr. Judge.    Raeann Bishop, MPAS  [facility]surgery  [address] [address]

## 2025-04-17 NOTE — PROGRESS NOTES
[facility]    Hospitalist Progress Note    Interval History   Patient is awake and alert.  Slow to answer questions but answering appropriately.  Moving all extremities.  Appears very pale and fatigued.    -Data reviewed today: I reviewed all new labs and imaging results over the last 24 hours. I personally reviewed the brain MRI image(s) showing large mass in the right temporal lobe with surrounding vasogenic edema .    Physical Exam   Temp: 98.1  F (36.7  C) Temp src: Oral BP: 122/81 Pulse: 81   Resp: 16 SpO2: 94 % O2 Device: None (Room air)    Vitals:    04/16/25 1339   Weight: 83.9 kg (185 lb)     Vital Signs with Ranges  Temp:  [97.5  F (36.4  C)-98.6  F (37  C)] 98.1  F (36.7  C)  Pulse:  [52-81] 81  Resp:  [10-27] 16  BP: ()/(38-81) 122/81  SpO2:  [94 %-98 %] 94 %  No intake/output data recorded.    Physical Exam  Constitutional:       Appearance: He is obese. He is ill-appearing.   Cardiovascular:      Rate and Rhythm: Normal rate and regular rhythm.      Pulses: Normal pulses.      Heart sounds: Normal heart sounds.   Pulmonary:      Effort: Pulmonary effort is normal. No respiratory distress.      Breath sounds: Normal breath sounds.   Abdominal:      General: Abdomen is flat. Bowel sounds are normal. There is no distension.      Tenderness: There is no abdominal tenderness. There is no guarding.   Skin:     General: Skin is warm and dry.   Neurological:      General: No focal deficit present.      Comments: Slow to answer questions.  No obvious focal deficit           Medications   Current Facility-Administered Medications   Medication Dose Route Frequency Provider Last Rate Last Admin     Current Facility-Administered Medications   Medication Dose Route Frequency Provider Last Rate Last Admin    atorvastatin (LIPITOR) tablet 40 mg  40 mg Oral Daily Jordan Carrasco MD   40 mg at 04/17/25 0814    dexAMETHasone (DECADRON) injection 4 mg  4 mg Intravenous Q6H Luna  Jordan Little MD   4 mg at 04/17/25 1148    fenofibrate (LOFIBRA) tablet 54 mg  54 mg Oral Daily with breakfast Jordan Carrasco MD   54 mg at 04/17/25 0814    insulin aspart (NovoLOG) injection (RAPID ACTING)  1-12 Units Subcutaneous Q4H Jordan Carrasco MD   6 Units at 04/17/25 1445    isosorbide mononitrate (IMDUR) 24 hr tablet 30 mg  30 mg Oral Daily Jordan Carrasco MD   30 mg at 04/17/25 0813    levETIRAcetam (KEPPRA) tablet 500 mg  500 mg Oral BID Fanta, Corinne E, APRN CNP   500 mg at 04/17/25 1224    levothyroxine (SYNTHROID/LEVOTHROID) tablet 125 mcg  125 mcg Oral Daily Jordan Carrasco MD   125 mcg at 04/17/25 0637    sodium chloride (PF) 0.9% PF flush 3 mL  3 mL Intracatheter Q8H BOYD Jordan Carrasco MD           Data   Recent Labs   Lab 04/17/25  1309 04/17/25  1037 04/17/25  0827 04/16/25  1350 04/16/25  1344   WBC  --   --   --   --  5.4   HGB  --   --   --   --  12.7*   MCV  --   --   --   --  89   PLT  --   --   --   --  133*   NA  --   --  138  --  137   POTASSIUM  --   --  4.3  --  4.1   CHLORIDE  --   --  104  --  101   CO2  --   --  21*  --  24   BUN  --   --  24.1*  --  23.5*   CR  --   --  1.05  --  1.22*   ANIONGAP  --   --  13  --  12   STARR  --   --  9.8  --  10.3   * 260* 260*   < > 247*    < > = values in this interval not displayed.       Recent Results (from the past 24 hours)   Chest XR,  PA & LAT    Narrative    EXAM: XR CHEST 2 VIEWS  LOCATION: [facility]  DATE: 4/16/2025    INDICATION: check for pneumonia, weakness  COMPARISON: PET/CT 12/23/2024, chest radiograph 10/15/2022.      Impression    IMPRESSION: Stable size of cardiomediastinal silhouette status post median sternotomy and CABG. No focal airspace consolidation, pleural effusion or pneumothorax. No acute bony abnormality. Prior cholecystectomy.   CT Head w/o Contrast   Result Value    Rad Flag-Addendum Intracranial mass (AA)    Addendum: 4/16/2025      [Critical  Result: Intracranial mass]    Finding was identified on 4/16/2025 5:05 PM CDT.     Dr. Sandoval was contacted by me on 4/16/2025 5:15 PM CDT and verbalized understanding of the critical result.       Narrative    EXAM: CT HEAD W/O CONTRAST  LOCATION: [facility]  DATE: 4/16/2025    INDICATION: Fall, altered mental status.  COMPARISON: None.  TECHNIQUE: Routine CT Head without IV contrast. Multiplanar reformats. Dose reduction techniques were used.    FINDINGS:  INTRACRANIAL CONTENTS: Large mass in the right anterior temporal lobe measures 5.3 x 4 x 3.8 cm (AP, TR, CC). Significant surrounding vasogenic edema extends into the surrounding white matter and basal ganglia region resulting in right to left midline   shift of 6.6 mm. A posterior component of the mass appears cystic with the hemorrhagic fluid level. No CT evidence of acute infarct. Mild presumed chronic small vessel ischemic changes. Effacement of right lateral ventricle. No hydrocephalus.    VISUALIZED ORBITS/SINUSES/MASTOIDS: No intraorbital abnormality. No paranasal sinus mucosal disease. No middle ear or mastoid effusion.    BONES/SOFT TISSUES: No acute abnormality.      Impression    IMPRESSION:  1.  Large mass in the right anterior temporal lobe measures 5.3 x 4 x 3.8 cm. Associated hemorrhage within the posterior (cystic) aspect of this mass.  2.  Significant surrounding vasogenic edema.  3.  Right to left midline shift 6.6 mm.  4.  Recommend MRI without and with intravenous contrast.     MR Brain w/o & w Contrast    Narrative    EXAM: MR BRAIN W/O and W CONTRAST  LOCATION: [facility]  DATE: 4/16/2025    INDICATION: Mass. stealth protocol included please  COMPARISON: CT 4/16/2025.  CONTRAST: 15mL Gadavist  TECHNIQUE: Routine multiplanar multisequence head MRI without and with intravenous contrast. Thin section imaging performed.    FINDINGS:  INTRACRANIAL CONTENTS: Large mass in the right temporal  lobe measures 5.3 x 3.8 x 4.3 cm (AP, TR, CC). The lesion demonstrates irregular heterogenous enhancement, intermediate signal on T1-weighted images, solid anterior component, cystic smaller   posterior hemorrhage fluid level. Extensive surrounding vasogenic edema. Right-to-left midline shift, 4 to 5 mm.     No acute or subacute infarct. Scattered nonspecific T2/FLAIR hyperintensities within the cerebral white matter most consistent with mild chronic microvascular ischemic change. Mild generalized cerebral atrophy. No hydrocephalus. Normal position of the   cerebellar tonsils.     SELLA: No abnormality accounting for technique.    OSSEOUS STRUCTURES/SOFT TISSUES: Normal marrow signal. The major intracranial vascular flow voids are maintained.     ORBITS: No abnormality accounting for technique.     SINUSES/MASTOIDS: No paranasal sinus mucosal disease. No middle ear or mastoid effusion.       Impression    IMPRESSION:  1.  Large mass right temporal lobe with extensive surrounding vasogenic edema, measures 5.3 x 3.8 x 4.3 cm. Hemorrhagic signal within the mass.  2.  Right-to-left midline shift 4 to 5 mm.     CT Chest/Abdomen/Pelvis w Contrast    Narrative    EXAM: CT CHEST/ABDOMEN/PELVIS W CONTRAST  LOCATION: [facility]  DATE: 4/17/2025    INDICATION: CT neck and C A P.  COMPARISON: PET/CT 12/23/2024 CT chest, abdomen and pelvis 9/12/2024.  TECHNIQUE: CT scan of the chest, abdomen, and pelvis was performed following injection of IV contrast. Multiplanar reformats were obtained. Dose reduction techniques were used.   CONTRAST: 75 mL Isovue 370.    FINDINGS:   LUNGS AND PLEURA: No new or enlarging suspicious pulmonary nodule. Similar few scattered small nodules, the largest in the right middle lobe measuring 3 mm (5/175).    MEDIASTINUM/AXILLAE: No lymphadenopathy. Aortic valve calcification.    CORONARY ARTERY CALCIFICATION: Previous intervention (stents or CABG).    HEPATOBILIARY:  Cholecystectomy. No suspicious liver lesion.    PANCREAS: Normal.    SPLEEN: Normal.    ADRENAL GLANDS: Normal.    KIDNEYS/BLADDER: Normal.    BOWEL: Normal.    LYMPH NODES: No enlarged lymph node by size criteria.    VASCULATURE: Severe aortic calcification.    PELVIC ORGANS: No pelvic mass. Trace pelvic free fluid.    MUSCULOSKELETAL: Similar T4 congenital deformity. No new aggressive osseous lesion. Median sternotomy.      Impression    IMPRESSION:  1.  No evidence for progressive metastatic disease within the chest, abdomen or pelvis.     CT Soft Tissue Neck w Contrast    Narrative    EXAM: CT SOFT TISSUE NECK W CONTRAST  LOCATION: [facility]  DATE: 4/17/2025    INDICATION: recurrent small cell of the neck, plan for Ct C A P and neck to evaluate extent of recurrence. 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  COMPARISON: Brain MRI 04/16/2025. FDG PET/CT 12/23/2024. Neck CT 09/12/2024.  CONTRAST: 125mL Isovue 370  TECHNIQUE: Routine CT Soft Tissue Neck with IV contrast. Multiplanar reformats. Dose reduction techniques were used.    FINDINGS:     MUCOSAL SPACES/SOFT TISSUES: Mild radiation related fat stranding of the left neck. Normal mucosal spaces of the upper aerodigestive tract. No mucosal mass or inflammation identified. Normal vocal cords and infraglottic trachea. Normal oral cavity,    spaces, and floor of mouth structures.    LYMPH NODES: No enlarged cervical chain lymph nodes.     SALIVARY GLANDS: No evidence of recurrent left parotid mass. The left parotid gland is mildly atrophic, likely related to radiation changes. The right parotid gland and submandibular glands are unremarkable.    THYROID: Normal.     VESSELS: Vascular structures of the neck are patent. There is mild atherosclerotic plaque of the bilateral carotid arteries.    VISUALIZED INTRACRANIAL/ORBITS/SINUSES: Partially imaged right temporal  lobe mass, better evaluated on recent prior brain MRI. Visualized paranasal sinuses and mastoid air cells are clear.    OTHER: No destructive osseous lesion mild to moderate degenerative disc disease throughout the cervical spine. No evidence of high-grade spinal canal stenosis. The included lung apices are clear.      Impression    IMPRESSION:   1.  No evidence of residual/recurrent disease in the neck. No enlarged or abnormal appearing cervical chain lymph nodes.  2.  Partially imaged right temporal lobe mass is better evaluated on recent prior brain MRI.     CTA Head Neck with Contrast    Narrative    EXAM: CTA HEAD NECK W CONTRAST  LOCATION: [facility]  DATE: 4/17/2025    INDICATION: right temporal mass  COMPARISON: Brain MRI 4/16/2025  CONTRAST: 75mL Isovue 370  TECHNIQUE: Head and neck CT angiogram with IV contrast. Noncontrast head CT followed by axial helical CT images of the head and neck vessels obtained during the arterial phase of intravenous contrast administration. Axial 2D reconstructed images and   multiplanar 3D MIP reconstructed images of the head and neck vessels were performed by the technologist. Dose reduction techniques were used. All stenosis measurements made according to NASCET criteria unless otherwise specified.    FINDINGS:     HEAD CTA:  ANTERIOR CIRCULATION: Calcified atherosclerotic plaques in bilateral carotid siphons without without high-grade stenosis/occlusion, aneurysm, or high flow vascular malformation. Standard [place] of Fisher anatomy. Anterior displacement of the MCA. Again   demonstrated is enhancing right temporal mass with surrounding vasogenic edema. Unchanged 6 mm leftward midline shift. Refer to earlier same day MRI for details of the mass    POSTERIOR CIRCULATION: No stenosis/occlusion, aneurysm, or high flow vascular malformation. Balanced vertebral arteries supply a normal basilar artery.     DURAL VENOUS SINUSES: Expected enhancement of  the major dural venous sinuses.    NECK CTA:  RIGHT CAROTID: Calcified atherosclerotic plaques at the bifurcation without high-grade stenosis or dissection.    LEFT CAROTID: Calcified atherosclerotic plaques at the bifurcation without high-grade stenosis or dissection.    VERTEBRAL ARTERIES: No focal stenosis or dissection. Balanced vertebral arteries.    AORTIC ARCH: Classic aortic arch anatomy with no significant stenosis at the origin of the great vessels.    NONVASCULAR STRUCTURES: Sternotomy changes. Bilateral flight vertebrae at T2.      Impression    IMPRESSION:   HEAD CTA:  1.  Patent major intracranial arteries without large vessel occlusion, high-grade stenosis or aneurysm.  2.  Anterior displacement of the right MCA secondary to right temporal mass.  3.  Enhancing right temporal mass with surrounding vasogenic edema. Unchanged 6 mm leftward midline shift. Refer to brain MRI from yesterday for details.    NECK CTA:  Patent major cervical arteries without high-grade stenosis or dissection.           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 Admitted on 4/16/2025.      Brain mass with hemorrhage, vasogenic edema   High grade neuroendocrine tumor, large cell type, left parotid gland s/p chemoradiation   *Presents with increased lethargy, somnolence, word-finding and memory difficulty, generalized weakness in legs   *CT head with 5.3 x 4 x 3.8 cm right temporal lobe mass with associated hemorrhagic aspect, significant surrounding vasogenic edema, right to left midline shift 6.6 mm  *MRI brain with 5.3 x 3.8 x 4.3 cm right temporal lobe mass with extensive surrounding vasogenic edema, hemorrhagic signal within the mass, right to left midline shift 4 to 5 mm  *Hx of neuroendocrine tumor as above, follows with  [facility] (Dr. Butler), felt to be in remission  *Case discussed and evaluated by neurosurgery in ED   - Neurosurgery consulted  - [facility] consulted.  Imaging modalities ordered as below  -CT chest abdomen pelvis shows no evidence of progressive metastatic disease  -CT soft tissue neck shows no evidence of recurrent or residual disease in the neck or or no enlarged or abnormal appearing cervical chain lymph nodes  -CT angio head and neck shows patent intracranial and cervical arteries with no high-grade stenosis or dissection or occlusion.  Anterior displacement of right MCA secondary to right temporal mass with surrounding vasogenic edema.  - Plan for surgical intervention next week.  -On Keppra 500 twice daily for seizure prophylaxis.  - Q2H neuro checks   - Head of bed elevated 30 degrees  - Hold apixaban   - Goal SBP <150, PRN IV labetalol, hydralazine ordered  - Dexamethasone 4 mg q6H IV per neurosurgery recommendations      Coronary artery disease status post CABG  LVH  Mild troponin elevation  Troponin 42->45. Denies chest pain. EKG reviewed by cardiology initially due to ST elevations in setting of repolarization abnormality, not felt to be ischemic. Likely secondary to brain mass/bleed.   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 Creatinine 1.22 on admission.   - Currently around baseline  - Avoid nephrotoxins  - Monitor BMP.  Creatinine improved to 1.05     Diabetes mellitus, type 2, on long term insulin   HgbA1c 6.9% 2/10/25. Prior to admission on glargine 30 units BID, aspart 3 units per carb unit.  - Received Lantus 15 units on admission.  Will resume Lantus 30 units twice daily  - medium sliding scale insulin while on IV steroids  - Hypoglycemia protocol     Hyperlipidemia  - Continue prior to admission atorvastatin,  "fenofibrate      Hypothyroidism  - Continue prior to admission levothyroxine           Clinically Significant Risk Factors Present on Admission                # Drug Induced Coagulation Defect: home medication list includes an anticoagulant medication    # Hypertension: Noted on problem list          # DMII: A1C = 6.9 % (Ref range: 0.0 - 5.6 %) within past 6 months   # Overweight: Estimated body mass index is 25.8 kg/m  as calculated from the following:    Height as of this encounter: 1.803 m (5' 11\").    Weight as of this encounter: 83.9 kg (185 lb).       # Financial/Environmental Concerns:            DVT Prophylaxis: Pneumatic Compression Devices  Code Status: Full Code  Medically Ready for Discharge: Anticipated in 5+ Days      Please see A&P for additional details of medical decision making.  55 MINUTES SPENT BY ME on the date of service doing chart review, history, exam, documentation & further activities per the note.    Reviewed neurosurgery and oncology recommendations and discussed with patient at bedside    Cori Jeffery MD, MD  [phone](p)   "

## 2025-04-17 NOTE — H&P
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 Admitted on 4/16/2025.     Brain mass with hemorrhage, vasogenic edema   High grade neuroendocrine tumor, large cell type, left parotid gland s/p chemoradiation   *Presents with increased lethargy, somnolence, word-finding and memory difficulty, generalized weakness in legs   *CT head with 5.3 x 4 x 3.8 cm right temporal lobe mass with associated hemorrhagic aspect, significant surrounding vasogenic edema, right to left midline shift 6.6 mm  *MRI brain with 5.3 x 3.8 x 4.3 cm right temporal lobe mass with extensive surrounding vasogenic edema, hemorrhagic signal within the mass, right to left midline shift 4 to 5 mm  *Hx of neuroendocrine tumor as above, follows with [facility] (Dr. Butler), felt to be in remission  *Case discussed and evaluated by neurosurgery in ED   - Neurosurgery consulted  - [facility] consulted  - NPO pending determination of surgical plan   - Q2H neuro checks   - Head of bed elevated 30 degrees  - Hold apixaban   - Goal SBP <150, PRN IV labetalol, hydralazine ordered  - Dexamethasone 4 mg q6H IV per neurosurgery recommendations     Coronary artery disease status post CABG  LVH  Mild troponin elevation  Troponin 42->45. Denies chest pain. EKG reviewed by cardiology initially due to ST elevations in setting of repolarization abnormality, not felt to be ischemic. Likely secondary to brain mass/bleed.   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 Creatinine 1.22  "on admission.   - Currently around baseline  - Avoid nephrotoxins  - Monitor BMP     Diabetes mellitus, type 2, on long term insulin   HgbA1c 6.9% 2/10/25. Prior to admission on glargine 30 units BID, aspart 3 units per carb unit.  - Glargine 15 units x1, resume PTA pending diet/surgical plan  - High sliding scale insulin while on IV steroids, NPO protocol   - Hypoglycemia protocol    Hyperlipidemia  - Continue prior to admission atorvastatin, fenofibrate     Hypothyroidism  - Continue prior to admission levothyroxine         Diet: NPO  DVT Prophylaxis: Pneumatic Compression Devices  Duran Catheter: Not present  Code Status: Full code     Disposition Plan   Admit to inpatient.      Medically Ready for Discharge: Anticipated in 2-4 Days       Entered: Jordan Carrasco MD 04/16/2025, 11:06 PM     The patient's care was discussed with the ED provider, patient and neurosurgery team       Clinically Significant Risk Factors Present on Admission                # Drug Induced Coagulation Defect: home medication list includes an anticoagulant medication    # Hypertension: Noted on problem list          # DMII: A1C = 6.9 % (Ref range: 0.0 - 5.6 %) within past 6 months   # Overweight: Estimated body mass index is 25.8 kg/m  as calculated from the following:    Height as of this encounter: 1.803 m (5' 11\").    Weight as of this encounter: 83.9 kg (185 lb).       # Financial/Environmental Concerns:                Jordan Carrasco MD  [facility]    ______________________________________________________________________    Chief Complaint   Weakness, lethargy    History of Present Illness   Pascual Perea is a 84 year old male who presents with the above chief complaint.    History is obtained from the patient, discussion with ED provider and review of medical record.  The patient reports for the past 2 to 3 weeks he has had increased lethargy and generalized weakness particularly in his legs.  He " reports he has been sleeping for most of the day.  He reports some difficulty finding words and with short-term memory recently.  He denies any focal numbness/tingling/weakness.  Denies any headaches, vision changes.  He denies any chest pain.  He reports some shortness of breath which is at baseline currently.    In the Emergency Department, the patient was seen by Dr. Sandoval, with whom I discussed the patient's presenting symptoms and emergency department course.  Initial vital signs were a temperature of 97.4F, HR 81, /79, RR 16, SpO2 97% on room air. Pertinent work-up as noted in A&P. The patient received 1L normal saline, 10 mg IV dexamethasone and Hospitalists were contacted for admission to the hospital.     Past Medical History    I have reviewed this patient's medical history and updated it with pertinent information if needed.   Past Medical History:   Diagnosis Date    Adhesive capsulitis of shoulder     Allergic rhinitis 01/07/2008    Problem list name updated by automated process. Provider to review      Anemia 03/10/2014    Anemia, unspecified     Antiplatelet or antithrombotic long-term use     Arrhythmia     Atrial fib/flutter    Atrial fibrillation, unspecified type (H) 05/02/2018    CAD (coronary artery disease)     Chemotherapy-induced neutropenia 07/19/2024    Coronary artery disease involving native coronary artery of native heart without angina pectoris 02/16/2025    Coronary atherosclerosis     Nuc Stress 4/15/16: On both stress and rest images there is a very small size mild intensity apical photopenic defect which is of equal extent and intensity on both stress and rest images. Gated images demonstrated no regional wall motion abnormalities. The left ventricular ejection fraction was calculated to be 61% with stress.      Essential hypertension, benign 01/03/2003    Gastroesophageal reflux disease without esophagitis 04/27/2016    Gastroparesis     delayed emptying study May 2022     High grade neuroendocrine carcinoma (H) 07/19/2024    History of cardioversion 04/24/2018    a single synchronized shock of 120 joules restored normal sinus rhythm    History of cardioversion 02/13/2019    single shock , 200 joules successul in restoring NSR    History of colonic polyps 03/24/2017    Hyperlipidemia LDL goal <70 05/26/2011    Hypertension     Hypothyroidism     Hypothyroidism, unspecified type 08/15/2016    Impotence of organic origin     Laceration of finger 06/2010     left thumb s/p sutures    Numbness and tingling     diabetic neuropathy bilateral feet    BRANDON (obstructive sleep apnea)     intolerant of CPAP, uses it occasionally.  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 disease, with long-term current use of insulin (H) 02/16/2025    Type 2 diabetes mellitus without complication  (goal A1C<8) 10/24/2015    Typical atrial flutter (H) 04/18/2016    Unspecified hypothyroidism     Vitamin D deficiency 09/03/2011       Past Surgical History   I have reviewed this patient's surgical history and updated it with pertinent information if needed.  Past Surgical History:   Procedure Laterality Date    ANESTHESIA CARDIOVERSION N/A 02/13/2019    Procedure: ANESTHESIA CARDIOVERSION;  Surgeon: GENERIC ANESTHESIA PROVIDER;  Location:  OR    ANESTHESIA CARDIOVERSION N/A 05/22/2019    Procedure: ANESTHESIA, FOR CARDIOVERSION;  Surgeon: GENERIC ANESTHESIA PROVIDER;  Location:  OR    CARDIAC SURGERY      bypass in 1997    CARDIOVERSION  04/24/2018    COLONOSCOPY      CORONARY ANGIOGRAPHY ADULT ORDER      4/2/2012     CORONARY ARTERY BYPASS  1997    [facility] [place] [facility] [place]    ENDOSCOPIC ULTRASOUND UPPER GASTROINTESTINAL TRACT (GI) N/A 2019    Procedure: ENDOSCOPIC ULTRASOUND OF UPPER GASTROINTESTINAL TRACT;  Surgeon: Ju Torres MD;  Location:  OR    ESOPHAGOSCOPY, GASTROSCOPY, DUODENOSCOPY (EGD), COMBINED N/A 4/15/2022    Procedure: ESOPHAGOGASTRODUODENOSCOPY (EGD);  Surgeon: Erik Baca, DO;  Location:  GI    EXCISE MASS HEAD Left 2016    Procedure: EXCISE MASS HEAD;  Surgeon: Ivan Hernandez MD;  Location: [facility]    HEART CATH, ANGIOPLASTY  2012    IR CHEST PORT PLACEMENT > 5 YRS OF AGE  2024    IR PORT REMOVAL RIGHT  2025    LAPAROSCOPIC CHOLECYSTECTOMY N/A 2019    Procedure: LAPAROSCOPIC CHOLECYSTECTOMY;  Surgeon: Janel Paz MD;  Location:  OR    PHACOEMULSIFICATION CLEAR CORNEA WITH STANDARD INTRAOCULAR LENS IMPLANT Left 2015    Procedure: PHACOEMULSIFICATION CLEAR CORNEA WITH STANDARD INTRAOCULAR LENS IMPLANT;  Surgeon: Nixon Farnsworth MD;  Location:  EC    PHACOEMULSIFICATION CLEAR CORNEA WITH STANDARD INTRAOCULAR LENS IMPLANT Right 2015    Procedure: PHACOEMULSIFICATION CLEAR CORNEA WITH STANDARD INTRAOCULAR LENS IMPLANT;  Surgeon: Nixon Farnsworth MD;  Location:  EC    SEPTOPLASTY, TURBINOPLASTY, COMBINED Bilateral 2016    Procedure: COMBINED SEPTOPLASTY, TURBINOPLASTY;  Surgeon: Ivan Hernandez MD;  Location: [facility]    ZZ NONSPECIFIC PROCEDURE      CABG ([facility])    [facility] NONSPECIFIC PROCEDURE  2001    stress echo         Social History   I have reviewed this patient's social history and updated it with pertinent information if needed.  Social History     Tobacco Use    Smoking status: Former     Current packs/day: 0.00     Average packs/day: 1 pack/day for 6.5 years (6.5 ttl pk-yrs)     Types: Cigarettes, Cigars, Pipe     Start date:      Quit date: 1964     Years since quittin.8     Smokeless tobacco: Never    Tobacco comments:     10/16/24 Pt states he will an occasional cigar.    Vaping Use    Vaping status: Never Used   Substance Use Topics    Alcohol use: Not Currently     Comment: couple drinks a year    Drug use: No          Family History   I have reviewed this patient's family history and updated it with pertinent information if needed.   Family History   Problem Relation Age of Onset    Genitourinary Problems Father         d: age 89; ARF    Hypertension Father     Diabetes Mother         d: age 68, CAD    Heart Disease Mother         MI    Myocardial Infarction Mother     Cardiovascular Brother         d:  age 31; CAD    Heart Disease Brother         age 31, MI    Diabetes Sister         b:  1939; DM2    Diabetes Sister         Prior to Admission Medications    Prior to Admission Medications   Prescriptions Last Dose Informant Patient Reported? Taking?   Artificial Saliva (ACT DRY MOUTH) LOZG 4/15/2025 Bedtime Self Yes Yes   Sig: Take 1 lozenge by mouth at bedtime.   Continuous Glucose  (FREESTYLE DELFINA 2 READER) CHRYSTAL  Self No No   Si Device continuously.   Continuous Glucose Sensor (FREESTYLE DELFINA 2 SENSOR) MISC  Self No No   Sig: APPLY 1 SENSOR AND CHANGE EVERY 14 DAYS AS DIRECTED   ELIQUIS ANTICOAGULANT 5 MG tablet 4/15/2025 Evening Self Yes Yes   Sig: Take 1 tablet by mouth 2 times daily.   Insulin Aspart FlexPen 100 UNIT/ML SOPN 2025 Morning Self No Yes   Sig: INJECT UNDER THE SKIN 3 TIMES PER DAY (BEFORE MEALS) 3 UNITS PER CARB CHOICE (15GM) APPROX 10-15 UNITS/MEAL PLUS PRIMING   atorvastatin (LIPITOR) 40 MG tablet Past Week Self No Yes   Sig: TAKE 1 TABLET (40 MG) BY MOUTH DAILY   azelastine 137 MCG/SPRAY SOLN Past Week Self No Yes   Sig: Spray 2 sprays into both nostrils 2 times daily.   Patient taking differently: Spray 2 sprays into both nostrils 2 times daily as needed (runny nose).   blood glucose (ACCU-CHEK NORMAN PLUS) test strip  Self No No   Sig: USE  TO TEST BLOOD SUGAR 3 TIMES A DAY OR AS DIRECTED   blood glucose calibration (ACCU-CHEK NORMAN) solution  Self No No   Sig: USE AS DIRECTED   cyanocobalamin (VITAMIN B-12) 1000 MCG tablet Past Week Self Yes Yes   Sig: Take 1 tablet (1,000 mcg) by mouth daily.   fenofibrate micronized (LOFIBRA) 67 MG capsule 4/15/2025 Morning Self No Yes   Sig: TAKE 1 CAPSULE BY MOUTH EVERY MORNING BEFORE BREAKFAST   insulin glargine (LANTUS SOLOSTAR) 100 UNIT/ML pen 4/16/2025 Morning Self No Yes   Sig: Inject 30 Units subcutaneously 2 times daily.   insulin pen needle (GLOBAL EASE INJECT PEN NEEDLES) 31G X 5 MM miscellaneous  Self No No   Sig: Use one pen needle 5 times a day   isosorbide mononitrate (IMDUR) 30 MG 24 hr tablet Past Week Self No Yes   Sig: Take 1 tablet (30 mg) by mouth daily   levothyroxine (SYNTHROID/LEVOTHROID) 125 MCG tablet 4/15/2025 Morning Self No Yes   Sig: Take 1 tablet (125 mcg) by mouth daily   polyethylene glycol 400 (BLINK TEARS) 0.25 % SOLN ophthalmic solution 4/15/2025 Morning Self Yes Yes   Sig: Place 1 drop into both eyes daily.      Facility-Administered Medications: None     Allergies   Allergies   Allergen Reactions    Omeprazole      diarrhea    Pantoprazole      diarrhea       Physical Exam   Vital Signs: Temp: 97.4  F (36.3  C) Temp src: Temporal BP: (!) 150/53 Pulse: 53   Resp: 10 SpO2: 96 % O2 Device: None (Room air)    Weight: 185 lbs 0 oz    Constitutional: NAD  Eyes: PERRL, EOMI\  Respiratory: Clear to auscultation bilaterally, good air movement, normal effort   Cardiovascular: RRR, no m/r/g. No peripheral edema.   GI: Soft, non-tender, non-distended. No rebound tenderness or guarding.    Skin: Warm, dry   Neurologic: Alert. Responding to questions appropriately. Following commands. Face symmetric. Tongue midline. 4+/5 upper extremity strength on the left, otherwise 5/5 on the right and 5/5 lower extremity strength symmetric bilaterally. Intact finger-nose-finger testing symmetric  bilaterally. Intact heal-shin testing symmetric bilaterally.    Psychiatric: Normal affect, appropriate      Data   Data reviewed today: I reviewed all medications, new labs and imaging results over the last 24 hours. I personally reviewed the EKG tracing showing undetermined rhythm, LVH with repolarization abnormality, T wave inversions I, II, aVF, V5-V6 .    Recent Labs   Lab 04/16/25  1350 04/16/25  1344   WBC  --  5.4   HGB  --  12.7*   MCV  --  89   PLT  --  133*   NA  --  137   POTASSIUM  --  4.1   CHLORIDE  --  101   CO2  --  24   BUN  --  23.5*   CR  --  1.22*   ANIONGAP  --  12   STARR  --  10.3   * 247*       Recent Results (from the past 24 hours)   Chest XR,  PA & LAT    Narrative    EXAM: XR CHEST 2 VIEWS  LOCATION: [facility]  DATE: 4/16/2025    INDICATION: check for pneumonia, weakness  COMPARISON: PET/CT 12/23/2024, chest radiograph 10/15/2022.      Impression    IMPRESSION: Stable size of cardiomediastinal silhouette status post median sternotomy and CABG. No focal airspace consolidation, pleural effusion or pneumothorax. No acute bony abnormality. Prior cholecystectomy.   CT Head w/o Contrast   Result Value    Rad Flag-Addendum Intracranial mass (AA)    Addendum: 4/16/2025      [Critical Result: Intracranial mass]    Finding was identified on 4/16/2025 5:05 PM CDT.     Dr. Sandoval was contacted by me on 4/16/2025 5:15 PM CDT and verbalized understanding of the critical result.       Narrative    EXAM: CT HEAD W/O CONTRAST  LOCATION: [facility]  DATE: 4/16/2025    INDICATION: Fall, altered mental status.  COMPARISON: None.  TECHNIQUE: Routine CT Head without IV contrast. Multiplanar reformats. Dose reduction techniques were used.    FINDINGS:  INTRACRANIAL CONTENTS: Large mass in the right anterior temporal lobe measures 5.3 x 4 x 3.8 cm (AP, TR, CC). Significant surrounding vasogenic edema extends into the surrounding white matter and basal  ganglia region resulting in right to left midline   shift of 6.6 mm. A posterior component of the mass appears cystic with the hemorrhagic fluid level. No CT evidence of acute infarct. Mild presumed chronic small vessel ischemic changes. Effacement of right lateral ventricle. No hydrocephalus.    VISUALIZED ORBITS/SINUSES/MASTOIDS: No intraorbital abnormality. No paranasal sinus mucosal disease. No middle ear or mastoid effusion.    BONES/SOFT TISSUES: No acute abnormality.      Impression    IMPRESSION:  1.  Large mass in the right anterior temporal lobe measures 5.3 x 4 x 3.8 cm. Associated hemorrhage within the posterior (cystic) aspect of this mass.  2.  Significant surrounding vasogenic edema.  3.  Right to left midline shift 6.6 mm.  4.  Recommend MRI without and with intravenous contrast.     MR Brain w/o & w Contrast    Narrative    EXAM: MR BRAIN W/O and W CONTRAST  LOCATION: [facility]  DATE: 4/16/2025    INDICATION: Mass. stealth protocol included please  COMPARISON: CT 4/16/2025.  CONTRAST: 15mL Gadavist  TECHNIQUE: Routine multiplanar multisequence head MRI without and with intravenous contrast. Thin section imaging performed.    FINDINGS:  INTRACRANIAL CONTENTS: Large mass in the right temporal lobe measures 5.3 x 3.8 x 4.3 cm (AP, TR, CC). The lesion demonstrates irregular heterogenous enhancement, intermediate signal on T1-weighted images, solid anterior component, cystic smaller   posterior hemorrhage fluid level. Extensive surrounding vasogenic edema. Right-to-left midline shift, 4 to 5 mm.     No acute or subacute infarct. Scattered nonspecific T2/FLAIR hyperintensities within the cerebral white matter most consistent with mild chronic microvascular ischemic change. Mild generalized cerebral atrophy. No hydrocephalus. Normal position of the   cerebellar tonsils.     SELLA: No abnormality accounting for technique.    OSSEOUS STRUCTURES/SOFT TISSUES: Normal marrow signal.  The major intracranial vascular flow voids are maintained.     ORBITS: No abnormality accounting for technique.     SINUSES/MASTOIDS: No paranasal sinus mucosal disease. No middle ear or mastoid effusion.       Impression    IMPRESSION:  1.  Large mass right temporal lobe with extensive surrounding vasogenic edema, measures 5.3 x 3.8 x 4.3 cm. Hemorrhagic signal within the mass.  2.  Right-to-left midline shift 4 to 5 mm.

## 2025-04-18 VITALS
WEIGHT: 185 LBS | TEMPERATURE: 97.9 F | SYSTOLIC BLOOD PRESSURE: 127 MMHG | HEART RATE: 55 BPM | BODY MASS INDEX: 25.9 KG/M2 | DIASTOLIC BLOOD PRESSURE: 53 MMHG | HEIGHT: 71 IN | OXYGEN SATURATION: 96 % | RESPIRATION RATE: 21 BRPM

## 2025-04-18 VITALS
WEIGHT: 185 LBS | OXYGEN SATURATION: 99 % | BODY MASS INDEX: 25.9 KG/M2 | DIASTOLIC BLOOD PRESSURE: 54 MMHG | TEMPERATURE: 97.7 F | HEIGHT: 71 IN | SYSTOLIC BLOOD PRESSURE: 110 MMHG | HEART RATE: 56 BPM | RESPIRATION RATE: 20 BRPM

## 2025-04-18 LAB
ANION GAP SERPL CALCULATED.3IONS-SCNC: 16 MMOL/L (ref 7–15)
BUN SERPL-MCNC: 22.8 MG/DL (ref 8–23)
CALCIUM SERPL-MCNC: 10 MG/DL (ref 8.8–10.4)
CHLORIDE SERPL-SCNC: 100 MMOL/L (ref 98–107)
CREAT SERPL-MCNC: 1.04 MG/DL (ref 0.67–1.17)
EGFRCR SERPLBLD CKD-EPI 2021: 71 ML/MIN/1.73M2
GLUCOSE BLDC GLUCOMTR-MCNC: 273 MG/DL (ref 70–99)
GLUCOSE BLDC GLUCOMTR-MCNC: 323 MG/DL (ref 70–99)
GLUCOSE BLDC GLUCOMTR-MCNC: 404 MG/DL (ref 70–99)
GLUCOSE SERPL-MCNC: 388 MG/DL (ref 70–99)
HCO3 SERPL-SCNC: 19 MMOL/L (ref 22–29)
POTASSIUM SERPL-SCNC: 4.7 MMOL/L (ref 3.4–5.3)
POTASSIUM SERPL-SCNC: 4.7 MMOL/L (ref 3.4–5.3)
SODIUM SERPL-SCNC: 135 MMOL/L (ref 135–145)

## 2025-04-18 PROCEDURE — 99239 HOSP IP/OBS DSCHRG MGMT >30: CPT | Performed by: HOSPITALIST

## 2025-04-18 PROCEDURE — 99232 SBSQ HOSP IP/OBS MODERATE 35: CPT

## 2025-04-18 PROCEDURE — 36415 COLL VENOUS BLD VENIPUNCTURE: CPT | Performed by: HOSPITALIST

## 2025-04-18 PROCEDURE — 84132 ASSAY OF SERUM POTASSIUM: CPT | Performed by: HOSPITALIST

## 2025-04-18 PROCEDURE — 250N000013 HC RX MED GY IP 250 OP 250 PS 637: Performed by: NURSE PRACTITIONER

## 2025-04-18 PROCEDURE — 80048 BASIC METABOLIC PNL TOTAL CA: CPT | Performed by: HOSPITALIST

## 2025-04-18 PROCEDURE — 250N000013 HC RX MED GY IP 250 OP 250 PS 637: Performed by: INTERNAL MEDICINE

## 2025-04-18 PROCEDURE — 250N000011 HC RX IP 250 OP 636: Performed by: INTERNAL MEDICINE

## 2025-04-18 RX ORDER — INSULIN GLARGINE 100 [IU]/ML
40 INJECTION, SOLUTION SUBCUTANEOUS 2 TIMES DAILY
Qty: 30 ML | Refills: 3 | Status: SHIPPED | OUTPATIENT
Start: 2025-04-18 | End: 2025-04-18

## 2025-04-18 RX ORDER — DEXAMETHASONE 4 MG/1
4 TABLET ORAL EVERY 12 HOURS
Qty: 10 TABLET | Refills: 0 | Status: ON HOLD | OUTPATIENT
Start: 2025-04-18

## 2025-04-18 RX ORDER — LEVETIRACETAM 500 MG/1
500 TABLET ORAL 2 TIMES DAILY
Qty: 30 TABLET | Refills: 0 | Status: ON HOLD | OUTPATIENT
Start: 2025-04-18

## 2025-04-18 RX ORDER — INSULIN GLARGINE 100 [IU]/ML
40 INJECTION, SOLUTION SUBCUTANEOUS 2 TIMES DAILY
Qty: 30 ML | Refills: 3 | Status: ON HOLD | OUTPATIENT
Start: 2025-04-18

## 2025-04-18 RX ORDER — DEXAMETHASONE 4 MG/1
4 TABLET ORAL EVERY 12 HOURS SCHEDULED
Status: DISCONTINUED | OUTPATIENT
Start: 2025-04-18 | End: 2025-04-18 | Stop reason: HOSPADM

## 2025-04-18 RX ADMIN — LEVETIRACETAM 500 MG: 500 TABLET, FILM COATED ORAL at 08:01

## 2025-04-18 RX ADMIN — FENOFIBRATE 54 MG: 54 TABLET ORAL at 08:01

## 2025-04-18 RX ADMIN — ISOSORBIDE MONONITRATE 30 MG: 30 TABLET, EXTENDED RELEASE ORAL at 08:01

## 2025-04-18 RX ADMIN — ATORVASTATIN CALCIUM 40 MG: 40 TABLET, FILM COATED ORAL at 08:01

## 2025-04-18 RX ADMIN — LEVOTHYROXINE SODIUM 125 MCG: 125 TABLET ORAL at 06:48

## 2025-04-18 RX ADMIN — DEXAMETHASONE SODIUM PHOSPHATE 4 MG: 4 INJECTION, SOLUTION INTRAMUSCULAR; INTRAVENOUS at 06:30

## 2025-04-18 ASSESSMENT — ACTIVITIES OF DAILY LIVING (ADL)
ADLS_ACUITY_SCORE: 41
ADLS_ACUITY_SCORE: 42
ADLS_ACUITY_SCORE: 41
ADLS_ACUITY_SCORE: 41
ADLS_ACUITY_SCORE: 42
ADLS_ACUITY_SCORE: 41
ADLS_ACUITY_SCORE: 41
ADLS_ACUITY_SCORE: 42
ADLS_ACUITY_SCORE: 41
ADLS_ACUITY_SCORE: 41
ADLS_ACUITY_SCORE: 42
ADLS_ACUITY_SCORE: 41

## 2025-04-18 NOTE — PLAN OF CARE
Occupational Therapy Discharge Summary    Reason for therapy discharge:    Discharged to home.    Progress towards therapy goal(s). See goals on Care Plan in [facility] electronic health record for goal details.  Goals partially met.  Barriers to achieving goals:   discharge from facility.    Therapy recommendation(s):    Pt currently functioning below baseline, mild decr activity kendall, mobility, impacting safety/independence within I/ADLs. Pt at baseline resides with adult son and shares IADLs with son. Currently, pt completes mobility/self cares with SPC and SBA. Anticipate with continued IP OT, pt will progress for discharge home with OP OT for IADLs, once medically ready.

## 2025-04-18 NOTE — PROGRESS NOTES
Hematology/Oncology Follow-up Note  [facility]    Date of Admission:  4/16/2025   Reason for Consult: brain mass   Primary Oncologist: Dr. Butler     ASSESSMENT : Pascual Perea is a 84 year old year old male who presented this hospitalization with lethargy, somnolence, difficulty word- finding, memory deficits. Imaging on admission showing a new right temporal lobe mass. The patient does have a history of high grade neuroendocrine tumor s/p chemotherapy and radiation.   Brain mass  Altered neuro status  Hx high grade neuroendocrine cancer       PLAN:  Plans to discharge and readmit for neurosurgery for planned right fronto-temporal craniotomy, with neoplasm excision with Dr. Erickson 4/22/25   Discussed results of CT neck and CAP, negative for malignancy   Steroids per neurosurgery   Please consult when pathology is final after surgery next week   Oncology will sign off       Brain mass   High grade neuroendocrine tumor, large cell type  04/16/2025 CT Head Large mass in the right anterior temporal lobe measures 5.3 x 4 x 3.8 cm. Associated hemorrhage within the posterior (cystic) aspect of this mass. Significant surrounding vasogenic edema. Right to left midline shift 6.6 mm.  04/16/2025 MRI Surjit  Large mass right temporal lobe with extensive surrounding vasogenic edema, measures 5.3 x 3.8 x 4.3 cm. Hemorrhagic signal within the mass. Right-to-left midline shift 4 to 5 mm.  04/17/2025 CT CAP negative for malignancy   04/17/2025 soft tissue neck negative for malignancy       EXAM:  Subjective  Denies headache. Denies lightheaded/ dizziness. Denies fatigue.     Objective  GENERAL/CONSTITUTIONAL: No acute distress.  NEUROLOGIC: Alert, oriented, answers questions appropriately.       Labs Reviewed: CBC, CMP, labs as above   Imaging Reviewed: as above       35 minutes spent on the date of the encounter doing review of outside records, review of test results, interpretation of tests,  implementing/ creating plan,  coordinating care, and documentation     Haleigh POLLACK, CNP  Hematology/Oncology  [facility]   Securely message with Renetta

## 2025-04-18 NOTE — DISCHARGE SUMMARY
[facility]    Discharge Summary  Hospitalist    Date of Admission:  4/16/2025  Date of Discharge:  4/18/2025    Discharge Diagnoses      Brain mass  Vasogenic brain edema (H)  Altered mental status, unspecified altered mental status type  Brain tumor (H)  Type 2 diabetes mellitus with stage 3b chronic kidney disease, with long-term current use of insulin (H)    History of Present Illness   Pascual Perea is an 84 year old male who presented with brain mass    Hospital Course   Pascual Perea was admitted on 4/16/2025.  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 Admitted on 4/16/2025.      Brain mass with hemorrhage, vasogenic edema and brain compression  High grade neuroendocrine tumor, large cell type, left parotid gland s/p chemoradiation   *Presents with increased lethargy, somnolence, word-finding and memory difficulty, generalized weakness in legs   *CT head with 5.3 x 4 x 3.8 cm right temporal lobe mass with associated hemorrhagic aspect, significant surrounding vasogenic edema, right to left midline shift 6.6 mm  *MRI brain with 5.3 x 3.8 x 4.3 cm right temporal lobe mass with extensive surrounding vasogenic edema, hemorrhagic signal within the mass, right to left midline shift 4 to 5 mm  *Hx of neuroendocrine tumor as above, follows with [facility] (Dr. Butler), felt to be in remission  *Case discussed and evaluated by neurosurgery in ED   - Neurosurgery consulted  - [facility] consulted.  Imaging modalities ordered as below  -CT chest abdomen pelvis shows no evidence of progressive metastatic disease  -CT soft tissue neck shows no evidence of recurrent or residual disease in the neck or or no enlarged or abnormal  appearing cervical chain lymph nodes  -CT angio head and neck shows patent intracranial and cervical arteries with no high-grade stenosis or dissection or occlusion.  Anterior displacement of right MCA secondary to right temporal mass with surrounding vasogenic edema.  - Plan for surgical intervention next week.  -On Keppra 500 twice daily for seizure prophylaxis.  - Q2H neuro checks   - Head of bed elevated 30 degrees  - Hold apixaban   - Goal SBP <150, PRN IV labetalol, hydralazine ordered  - Dexamethasone 4 mg q6H IV per neurosurgery recommendations.  Transition to 4 mg oral every 12 hours after discussion with neurosurgery.  -Neurosurgery cleared patient for discharge home on oral dexamethasone with plan to come back next week for surgery.  Date of surgery will be determined later and patient will be contacted.  Continue on Keppra upon discharge.     Coronary artery disease status post CABG  LVH  Mild troponin elevation  Troponin 42->45. Denies chest pain. EKG reviewed by cardiology initially due to ST elevations in setting of repolarization abnormality, not felt to be ischemic. Likely secondary to brain mass/bleed.   - Continue prior to admission Imdur   - Cardiac monitoring      Atrial fibrillation   - Cardiac monitoring  - Hold prior to admission apixaban upon discharge as well.  This will be restarted after surgery.  Apixaban has been removed from his med list.  Should be reevaluated postsurgery.     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 Creatinine 1.22 on admission.   - Currently around baseline  - Avoid nephrotoxins  - Monitor BMP.  Creatinine improved to 1.05     Diabetes mellitus, type 2, on long term insulin   HgbA1c 6.9% 2/10/25. Prior to admission on glargine 30 units BID, aspart 3 units per carb unit.  - Received Lantus 15 units on admission.  Will resume Lantus 30 units twice daily  - medium sliding scale insulin while on IV steroids  - Hypoglycemia protocol  -Increase  "Lantus to 40 units twice daily due to persistent hyperglycemia in the setting of steroid use     Hyperlipidemia  - Continue prior to admission atorvastatin, fenofibrate      Hypothyroidism  - Continue prior to admission levothyroxine              Clinically Significant Risk Factors                   # Hypertension: Noted on problem list           # DMII: A1C = 6.9 % (Ref range: 0.0 - 5.6 %) within past 6 months   # Overweight: Estimated body mass index is 25.8 kg/m  as calculated from the following:    Height as of this encounter: 1.803 m (5' 11\").    Weight as of this encounter: 83.9 kg (185 lb)., PRESENT ON ADMISSION       # Financial/Environmental Concerns:            Cori Jeffery MD, MD    Pending Results   These results will be followed up by pcp  Unresulted Labs Ordered in the Past 30 Days of this Admission       No orders found from 3/17/2025 to 4/17/2025.          Code Status   Full Code       Primary Care Physician   Andrew Elizalde    Physical Exam                      Vitals:    04/16/25 1339   Weight: 83.9 kg (185 lb)     Vital Signs with Ranges     I/O last 3 completed shifts:  In: 230 [P.O.:230]  Out: -     Physical Exam  Constitutional:       Appearance: Normal appearance.   Cardiovascular:      Rate and Rhythm: Normal rate and regular rhythm.      Pulses: Normal pulses.      Heart sounds: Normal heart sounds.   Pulmonary:      Effort: Pulmonary effort is normal. No respiratory distress.      Breath sounds: Normal breath sounds.   Abdominal:      General: Abdomen is flat. Bowel sounds are normal. There is no distension.      Tenderness: There is no abdominal tenderness. There is no guarding.   Skin:     General: Skin is warm and dry.      Coloration: Skin is pale.   Neurological:      General: No focal deficit present.           Discharge Disposition   Discharged to home  Condition at discharge: Stable    Consultations This Hospital Stay   CARDIOLOGY IP CONSULT  NEUROSURGERY IP CONSULT  HEMATOLOGY & " ONCOLOGY IP CONSULT  PHYSICAL THERAPY ADULT IP CONSULT  OCCUPATIONAL THERAPY ADULT IP CONSULT    Time Spent on this Encounter   I, Cori Jeffery MD, personally saw the patient today and spent greater than 30 minutes discharging this patient.    Discharge Orders      Follow Up    Neurosurgery will contact you with finalized date and time of surgery with Dr. Erickson. Please contact neurosurgery clinic at [phone] for questions, concerns.     Activity    Activity as tolerated    Hold all anticoagulation medications including aspirin and NSAIDs (ibuprofen, advil, aleeve).    Follow pre-op surgical instructions    Continue decadron and Keppra as prescribed.     Go to ER with any seizure activity, mental status change (increasing confusion), difficulty with speech or increasing or acute weakness     Reason for your hospital stay    Brain mass     Activity    Your activity upon discharge: activity as tolerated     Discharge Instructions    Apixaban discontinued on discharge . Will be restarted post op     Diet    Follow this diet upon discharge: Current Diet:Orders Placed This Encounter      Regular Diet Adult     Hospital Follow-up with Existing Primary Care Provider (PCP)          Discharge Medications   Discharge Medication List as of 4/18/2025  2:08 PM        START taking these medications    Details   dexAMETHasone (DECADRON) 4 MG tablet Take 1 tablet (4 mg) by mouth every 12 hours., Disp-10 tablet, R-0, E-Prescribe      levETIRAcetam (KEPPRA) 500 MG tablet Take 1 tablet (500 mg) by mouth 2 times daily., Disp-30 tablet, R-0, E-Prescribe           CONTINUE these medications which have CHANGED    Details   insulin glargine (LANTUS SOLOSTAR) 100 UNIT/ML pen Inject 40 Units subcutaneously 2 times daily., Disp-30 mL, R-3, E-PrescribeIf Lantus is not covered by insurance, may substitute Basaglar or Semglee or other insulin glargine product per insurance preference at same dose and frequency.             CONTINUE these  medications which have NOT CHANGED    Details   Artificial Saliva (ACT DRY MOUTH) LOZG Take 1 lozenge by mouth at bedtime., Historical      atorvastatin (LIPITOR) 40 MG tablet TAKE 1 TABLET (40 MG) BY MOUTH DAILY, Disp-90 tablet, R-2, E-Prescribe      azelastine 137 MCG/SPRAY SOLN [place] 2 sprays into both nostrils 2 times daily., Disp-30 mL, R-1, E-Prescribe      cyanocobalamin (VITAMIN B-12) 1000 MCG tablet Take 1 tablet (1,000 mcg) by mouth daily., Historical      fenofibrate micronized (LOFIBRA) 67 MG capsule TAKE 1 CAPSULE BY MOUTH EVERY MORNING BEFORE BREAKFAST, Disp-90 capsule, R-3, E-Prescribe      Insulin Aspart FlexPen 100 UNIT/ML SOPN INJECT UNDER THE SKIN 3 TIMES PER DAY (BEFORE MEALS) 3 UNITS PER CARB CHOICE (15GM) APPROX 10-15 UNITS/MEAL PLUS PRIMING, Disp-15 mL, R-4, E-Prescribe      isosorbide mononitrate (IMDUR) 30 MG 24 hr tablet Take 1 tablet (30 mg) by mouth daily, Disp-90 tablet, R-3, E-Prescribe      levothyroxine (SYNTHROID/LEVOTHROID) 125 MCG tablet Take 1 tablet (125 mcg) by mouth daily, Disp-90 tablet, R-3, E-PrescribeStop refills of Levothyroxine 112mcg tabs      polyethylene glycol 400 (BLINK TEARS) 0.25 % SOLN ophthalmic solution Place 1 drop into both eyes daily., Historical      blood glucose (ACCU-CHEK NORMAN PLUS) test strip USE TO TEST BLOOD SUGAR 3 TIMES A DAY OR AS DIRECTED, Disp-100 strip, R-2, E-Prescribe      blood glucose calibration (ACCU-CHEK NORMAN) solution USE AS DIRECTED, Disp-1 each, R-0, E-PrescribeMedication is being filled for 1 time refill only due to:  Due for office visit      Continuous Glucose  (FREESTYLE DELFINA 2 READER) CHRYSTAL 1 Device continuously., Disp-1 each, R-1, E-Prescribe      Continuous Glucose Sensor (FREESTYLE DELFINA 2 SENSOR) MIS APPLY 1 SENSOR AND CHANGE EVERY 14 DAYS AS DIRECTED, Disp-6 each, R-3, E-Prescribe      insulin pen needle (GLOBAL EASE INJECT PEN NEEDLES) 31G X 5 MM miscellaneous Use one pen needle 5 times a day, Disp-500 each, R-3,  E-Prescribe           STOP taking these medications       ELIQUIS ANTICOAGULANT 5 MG tablet Comments:   Reason for Stopping:             Allergies   Allergies   Allergen Reactions    Omeprazole      diarrhea    Pantoprazole      diarrhea     Data   Recent Labs   Lab Test 04/16/25  1344 02/14/25  1430 01/27/25  0846 09/30/19  0839 05/22/19  0948 03/18/19  0941 03/17/19  0541 03/15/19  0553 03/14/19  0847   WBC 5.4 3.8* 3.0*   < >  --    < > 6.1   < >  --    HGB 12.7* 9.9* 9.7*   < >  --    < > 13.1*   < >  --    MCV 89 95 94   < >  --    < > 85   < >  --    * 86* 85*   < >  --    < > 119*   < >  --    INR  --   --   --   --  1.43*  --  1.09  --  1.37*    < > = values in this interval not displayed.      Recent Labs   Lab Test 04/18/25  1243 04/18/25  0816 04/18/25  0722 04/17/25  1037 04/17/25  0827 04/16/25  1350 04/16/25  1344   NA  --  135  --   --  138  --  137   POTASSIUM  --  4.7  4.7  --   --  4.3  --  4.1   CHLORIDE  --  100  --   --  104  --  101   CO2  --  19*  --   --  21*  --  24   BUN  --  22.8  --   --  24.1*  --  23.5*   CR  --  1.04  --   --  1.05  --  1.22*   ANIONGAP  --  16*  --   --  13  --  12   STARR  --  10.0  --   --  9.8  --  10.3   * 388* 323*   < > 260*   < > 247*    < > = values in this interval not displayed.         Results for orders placed or performed during the hospital encounter of 04/16/25   CT Head w/o Contrast     Value    Rad Flag-Addendum Intracranial mass (AA)    Addendum: 4/16/2025      [Critical Result: Intracranial mass]    Finding was identified on 4/16/2025 5:05 PM CDT.     Dr. Sandoval was contacted by me on 4/16/2025 5:15 PM CDT and verbalized understanding of the critical result.       Narrative    EXAM: CT HEAD W/O CONTRAST  LOCATION: [facility]  DATE: 4/16/2025    INDICATION: Fall, altered mental status.  COMPARISON: None.  TECHNIQUE: Routine CT Head without IV contrast. Multiplanar reformats. Dose reduction techniques were  used.    FINDINGS:  INTRACRANIAL CONTENTS: Large mass in the right anterior temporal lobe measures 5.3 x 4 x 3.8 cm (AP, TR, CC). Significant surrounding vasogenic edema extends into the surrounding white matter and basal ganglia region resulting in right to left midline   shift of 6.6 mm. A posterior component of the mass appears cystic with the hemorrhagic fluid level. No CT evidence of acute infarct. Mild presumed chronic small vessel ischemic changes. Effacement of right lateral ventricle. No hydrocephalus.    VISUALIZED ORBITS/SINUSES/MASTOIDS: No intraorbital abnormality. No paranasal sinus mucosal disease. No middle ear or mastoid effusion.    BONES/SOFT TISSUES: No acute abnormality.      Impression    IMPRESSION:  1.  Large mass in the right anterior temporal lobe measures 5.3 x 4 x 3.8 cm. Associated hemorrhage within the posterior (cystic) aspect of this mass.  2.  Significant surrounding vasogenic edema.  3.  Right to left midline shift 6.6 mm.  4.  Recommend MRI without and with intravenous contrast.     Chest XR,  PA & LAT    Narrative    EXAM: XR CHEST 2 VIEWS  LOCATION: [facility]  DATE: 4/16/2025    INDICATION: check for pneumonia, weakness  COMPARISON: PET/CT 12/23/2024, chest radiograph 10/15/2022.      Impression    IMPRESSION: Stable size of cardiomediastinal silhouette status post median sternotomy and CABG. No focal airspace consolidation, pleural effusion or pneumothorax. No acute bony abnormality. Prior cholecystectomy.   MR Brain w/o & w Contrast    Narrative    EXAM: MR BRAIN W/O and W CONTRAST  LOCATION: [facility]  DATE: 4/16/2025    INDICATION: Mass. stealth protocol included please  COMPARISON: CT 4/16/2025.  CONTRAST: 15mL Gadavist  TECHNIQUE: Routine multiplanar multisequence head MRI without and with intravenous contrast. Thin section imaging performed.    FINDINGS:  INTRACRANIAL CONTENTS: Large mass in the right temporal lobe  measures 5.3 x 3.8 x 4.3 cm (AP, TR, CC). The lesion demonstrates irregular heterogenous enhancement, intermediate signal on T1-weighted images, solid anterior component, cystic smaller   posterior hemorrhage fluid level. Extensive surrounding vasogenic edema. Right-to-left midline shift, 4 to 5 mm.     No acute or subacute infarct. Scattered nonspecific T2/FLAIR hyperintensities within the cerebral white matter most consistent with mild chronic microvascular ischemic change. Mild generalized cerebral atrophy. No hydrocephalus. Normal position of the   cerebellar tonsils.     SELLA: No abnormality accounting for technique.    OSSEOUS STRUCTURES/SOFT TISSUES: Normal marrow signal. The major intracranial vascular flow voids are maintained.     ORBITS: No abnormality accounting for technique.     SINUSES/MASTOIDS: No paranasal sinus mucosal disease. No middle ear or mastoid effusion.       Impression    IMPRESSION:  1.  Large mass right temporal lobe with extensive surrounding vasogenic edema, measures 5.3 x 3.8 x 4.3 cm. Hemorrhagic signal within the mass.  2.  Right-to-left midline shift 4 to 5 mm.     CT Soft Tissue Neck w Contrast    Narrative    EXAM: CT SOFT TISSUE NECK W CONTRAST  LOCATION: [facility]  DATE: 4/17/2025    INDICATION: recurrent small cell of the neck, plan for Ct C A P and neck to evaluate extent of recurrence. 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  COMPARISON: Brain MRI 04/16/2025. FDG PET/CT 12/23/2024. Neck CT 09/12/2024.  CONTRAST: 125mL Isovue 370  TECHNIQUE: Routine CT Soft Tissue Neck with IV contrast. Multiplanar reformats. Dose reduction techniques were used.    FINDINGS:     MUCOSAL SPACES/SOFT TISSUES: Mild radiation related fat stranding of the left neck. Normal mucosal spaces of the upper aerodigestive tract. No mucosal mass or inflammation identified. Normal vocal cords and infraglottic  trachea. Normal oral cavity,    spaces, and floor of mouth structures.    LYMPH NODES: No enlarged cervical chain lymph nodes.     SALIVARY GLANDS: No evidence of recurrent left parotid mass. The left parotid gland is mildly atrophic, likely related to radiation changes. The right parotid gland and submandibular glands are unremarkable.    THYROID: Normal.     VESSELS: Vascular structures of the neck are patent. There is mild atherosclerotic plaque of the bilateral carotid arteries.    VISUALIZED INTRACRANIAL/ORBITS/SINUSES: Partially imaged right temporal lobe mass, better evaluated on recent prior brain MRI. Visualized paranasal sinuses and mastoid air cells are clear.    OTHER: No destructive osseous lesion mild to moderate degenerative disc disease throughout the cervical spine. No evidence of high-grade spinal canal stenosis. The included lung apices are clear.      Impression    IMPRESSION:   1.  No evidence of residual/recurrent disease in the neck. No enlarged or abnormal appearing cervical chain lymph nodes.  2.  Partially imaged right temporal lobe mass is better evaluated on recent prior brain MRI.     CT Chest/Abdomen/Pelvis w Contrast    Narrative    EXAM: CT CHEST/ABDOMEN/PELVIS W CONTRAST  LOCATION: [facility]  DATE: 4/17/2025    INDICATION: CT neck and C A P.  COMPARISON: PET/CT 12/23/2024 CT chest, abdomen and pelvis 9/12/2024.  TECHNIQUE: CT scan of the chest, abdomen, and pelvis was performed following injection of IV contrast. Multiplanar reformats were obtained. Dose reduction techniques were used.   CONTRAST: 75 mL Isovue 370.    FINDINGS:   LUNGS AND PLEURA: No new or enlarging suspicious pulmonary nodule. Similar few scattered small nodules, the largest in the right middle lobe measuring 3 mm (5/175).    MEDIASTINUM/AXILLAE: No lymphadenopathy. Aortic valve calcification.    CORONARY ARTERY CALCIFICATION: Previous intervention (stents or  CABG).    HEPATOBILIARY: Cholecystectomy. No suspicious liver lesion.    PANCREAS: Normal.    SPLEEN: Normal.    ADRENAL GLANDS: Normal.    KIDNEYS/BLADDER: Normal.    BOWEL: Normal.    LYMPH NODES: No enlarged lymph node by size criteria.    VASCULATURE: Severe aortic calcification.    PELVIC ORGANS: No pelvic mass. Trace pelvic free fluid.    MUSCULOSKELETAL: Similar T4 congenital deformity. No new aggressive osseous lesion. Median sternotomy.      Impression    IMPRESSION:  1.  No evidence for progressive metastatic disease within the chest, abdomen or pelvis.     CTA Head Neck with Contrast    Narrative    EXAM: CTA HEAD NECK W CONTRAST  LOCATION: [facility]  DATE: 4/17/2025    INDICATION: right temporal mass  COMPARISON: Brain MRI 4/16/2025  CONTRAST: 75mL Isovue 370  TECHNIQUE: Head and neck CT angiogram with IV contrast. Noncontrast head CT followed by axial helical CT images of the head and neck vessels obtained during the arterial phase of intravenous contrast administration. Axial 2D reconstructed images and   multiplanar 3D MIP reconstructed images of the head and neck vessels were performed by the technologist. Dose reduction techniques were used. All stenosis measurements made according to NASCET criteria unless otherwise specified.    FINDINGS:     HEAD CTA:  ANTERIOR CIRCULATION: Calcified atherosclerotic plaques in bilateral carotid siphons without without high-grade stenosis/occlusion, aneurysm, or high flow vascular malformation. Standard Marshall of Fisher anatomy. Anterior displacement of the MCA. Again   demonstrated is enhancing right temporal mass with surrounding vasogenic edema. Unchanged 6 mm leftward midline shift. Refer to earlier same day MRI for details of the mass    POSTERIOR CIRCULATION: No stenosis/occlusion, aneurysm, or high flow vascular malformation. Balanced vertebral arteries supply a normal basilar artery.     DURAL VENOUS SINUSES: Expected enhancement  of the major dural venous sinuses.    NECK CTA:  RIGHT CAROTID: Calcified atherosclerotic plaques at the bifurcation without high-grade stenosis or dissection.    LEFT CAROTID: Calcified atherosclerotic plaques at the bifurcation without high-grade stenosis or dissection.    VERTEBRAL ARTERIES: No focal stenosis or dissection. Balanced vertebral arteries.    AORTIC ARCH: Classic aortic arch anatomy with no significant stenosis at the origin of the great vessels.    NONVASCULAR STRUCTURES: Sternotomy changes. Bilateral flight vertebrae at T2.      Impression    IMPRESSION:   HEAD CTA:  1.  Patent major intracranial arteries without large vessel occlusion, high-grade stenosis or aneurysm.  2.  Anterior displacement of the right MCA secondary to right temporal mass.  3.  Enhancing right temporal mass with surrounding vasogenic edema. Unchanged 6 mm leftward midline shift. Refer to brain MRI from yesterday for details.    NECK CTA:  Patent major cervical arteries without high-grade stenosis or dissection.       *Note: Due to a large number of results and/or encounters for the requested time period, some results have not been displayed. A complete set of results can be found in Results Review.

## 2025-04-18 NOTE — PLAN OF CARE
VSS on RA. Tele afib with slow ventricular rate, HR 40-50's. A&O x4. Forgetful. Hard of hearing. Neuros intact. Generalized weakness. Ambulated to bathroom SBA with cane + gait belt. Denies pain. Repositioned independently in bed. Plan for surgery next week.

## 2025-04-18 NOTE — PLAN OF CARE
Goal Outcome Evaluation:      Plan of Care Reviewed With: patient    Overall Patient Progress: improvingOverall Patient Progress: improving         Neuros intact. Forgetful. Denies pain. Tele SR. Sliding scale insulin for elevated blood glucose. Regular diet, meds whole. Up with SBA, cane. Discharge instructions reviewed and quesitons answered with patient and family. Patient discharged home.

## 2025-04-18 NOTE — PROVIDER NOTIFICATION
MD Notification    Notified Person: MD    Notified Person Name: Dr. Marizol May    Notification Date/Time: 4/17/25 at 2153hrs    Notification Interaction: vocera    Purpose of Notification: Bedtime [id number]/dl    Orders Received: yes    Comments: additional 5u of insulin aspart given subcutaneous.

## 2025-04-18 NOTE — PLAN OF CARE
Reason for Admission: generalized weakness; new right temporal brain mass with shift    Cognitive/Mentation: A/Ox 4, forgetful  Neuros/CMS: Intact ex [place]  VS: stable on RA, Keep SBP <150.   Tele: SB.  /GI: Continent. Last BM 4/17.   Pulmonary: LS clear.  Pain: denies.     Drains/Lines: R PIV-SL  Skin: intact  Activity: SBAssist with cane/GB.  Diet: Regular with thin liquids. Takes pills whole.     Therapies recs: pending  Discharge: pending    Aggression Stoplight Tool: green    End of shift summary: No neuro changes this shift. Bedtime BG-[id number] noted by oncall hospitalist with additional 5u of Novolog given subcutaneous. planned for surgery early next week.

## 2025-04-18 NOTE — PROGRESS NOTES
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     AM Rounds:  Exam stable. Patient would like to go home for [facility] and return for surgery next week with maureen Sandoval from [facility] point of care.     Plan:  - Tentative plan for RIGHT FRONTO-TEMPORAL CRANIOTOMY, USING OPTICAL TRACKING SYSTEM, WITH NEOPLASM EXCISION  with Dr. Erickson 4/22/25   - case request placed, awaiting surgical time   - Medical clearance per hospitalist/PCP  - Decadron 4 BID  - Continue Keppra    Maureen for patient to discharge and return day of surgery from [facility] point of care. [facility] schedulers working on finalizing surgery time and will contact patient.     Plans discussed with Dr. Erickson and hospitalist     Navigator complete.      Please contact on-call neurosurgery [facility] via [employer] for questions, concerns, changes in neuroexam.     Qing Gillespie PA-C  [facility] Neurosurgery  [address]  [address]  [place] [address]      Physical Exam   Temp: 97.8  F (36.6  C) Temp src: Oral BP: 111/55 Pulse: 55   Resp: 21 SpO2: 100 % O2 Device: None (Room air)    Vitals:    04/16/25 1339   Weight: 83.9 kg (185 lb)     Vital Signs with Ranges  Temp:  [97.7  F (36.5  C)-98.3  F (36.8  C)] 97.8  F (36.6  C)  Pulse:  [46-81] 55  Resp:  [14-21] 21  BP: (105-122)/(38-81) 111/55  SpO2:  [94 %-100 %] 100 %  I/O last 3 completed shifts:  In: 230 [P.O.:230]  Out: -     Sitting up in hospital bed  Hard of hearing  Awake, alert, and appropriate. NAD  Oriented to self, location, time   Face symmetric   Speech clear   Cranial nerves II-XII intact   EOMI. PERRL   Moves all extremities equally   Strength 5/5 BUE/BLE   Sensation  intact to light touch BUE/BLE   Gait not formally assessed    Medications   Current Facility-Administered Medications   Medication Dose Route Frequency Provider Last Rate Last Admin      Current Facility-Administered Medications   Medication Dose Route Frequency Provider Last Rate Last Admin    atorvastatin (LIPITOR) tablet 40 mg  40 mg Oral Daily Jordan Carrasco MD   40 mg at 04/18/25 0801    dexAMETHasone (DECADRON) injection 4 mg  4 mg Intravenous Q6H Jordan Carrasco MD   4 mg at 04/18/25 0630    fenofibrate (LOFIBRA) tablet 54 mg  54 mg Oral Daily with breakfast Jordan Carrasco MD   54 mg at 04/18/25 0801    insulin aspart (NovoLOG) injection (RAPID ACTING)  5 Units Subcutaneous TID w/meals Cori Jeffery MD        insulin aspart (NovoLOG) injection (RAPID ACTING)  1-7 Units Subcutaneous TID AC Cori Jeffery MD   4 Units at 04/18/25 0755    insulin aspart (NovoLOG) injection (RAPID ACTING)  1-5 Units Subcutaneous At Bedtime Cori Jeffery MD   5 Units at 04/17/25 2146    insulin glargine (LANTUS PEN) injection 40 Units  40 Units Subcutaneous BID Cori Jeffery MD        isosorbide mononitrate (IMDUR) 24 hr tablet 30 mg  30 mg Oral Daily Jordan Carrasco MD   30 mg at 04/18/25 0801    levETIRAcetam (KEPPRA) tablet 500 mg  500 mg Oral BID Fanta, Corinne E, APRN CNP   500 mg at 04/18/25 0801    levothyroxine (SYNTHROID/LEVOTHROID) tablet 125 mcg  125 mcg Oral Daily Jordna Carrasco MD   125 mcg at 04/18/25 0648    sodium chloride (PF) 0.9% PF flush 3 mL  3 mL Intracatheter Q8H BOYD Jordan Carrasco MD   3 mL at 04/18/25 0630

## 2025-04-19 ENCOUNTER — PATIENT OUTREACH (OUTPATIENT)
Dept: CARE COORDINATION | Facility: CLINIC | Age: 85
End: 2025-04-19
Payer: COMMERCIAL

## 2025-04-21 ENCOUNTER — ANESTHESIA EVENT (OUTPATIENT)
Dept: SURGERY | Facility: CLINIC | Age: 85
End: 2025-04-21
Payer: COMMERCIAL

## 2025-04-21 ASSESSMENT — ENCOUNTER SYMPTOMS: DYSRHYTHMIAS: 1

## 2025-04-21 NOTE — PROGRESS NOTES
"  [facility]: Transitions of Care Outreach  Chief Complaint   Patient presents with    Clinic Care Coordination - Post Hospital       Most Recent Admission Date: 4/16/2025   Most Recent Admission Diagnosis: Vasogenic brain edema (H) - G93.6  Brain mass - G93.89  Altered mental status, unspecified altered mental status type - R41.82     Most Recent Discharge Date: 4/18/2025   Most Recent Discharge Diagnosis: Brain mass - G93.89  Vasogenic brain edema (H) - G93.6  Altered mental status, unspecified altered mental status type - R41.82  Brain tumor (H) - D49.6  Type 2 diabetes mellitus with stage 3b chronic kidney disease, with long-term current use of insulin (H) - E11.22, N18.32, Z79.4     Transitions of Care Assessment    Discharge Assessment  How are you doing now that you are home?: Patient states he is doing pretty good, just tired. Rates headache as just \"slight\".  States he feels maybe a bit worse due to the headache and fatigue.  How are your symptoms? (Red Flag symptoms escalate to triage hotline per guidelines): Worsening (Headache and feeling tired.)  Do you know how to contact your clinic care team if you have future questions or changes to your health status? : Yes  Does the patient have their discharge instructions? : Yes  Does the patient have questions regarding their discharge instructions? : No  Were you started on any new medications or were there changes to any of your previous medications? : Yes  Does the patient have all of their medications?: Yes  Do you have questions regarding any of your medications? : No  Do you have all of your needed medical supplies or equipment (DME)?  (i.e. oxygen tank, CPAP, cane, etc.): Yes         Post-op (Clinicians Only)  Did the patient have surgery or a procedure: No  Fever: No  Chills: No  Eating & Drinking: eating and drinking without complaints/concerns  PO Intake: regular diet  Additional Symptoms:  (Denies)  Bowel Function: normal  Date of " last BM: 04/20/25  Urinary Status: voiding without complaint/concerns    [facility] Explained and offered Care Coordination support to eligible patients: Yes    Patient accepted? No    Follow up Plan     Discharge Follow-Up  Discharge follow up appointment scheduled in alignment with recommended follow up timeframe or Transitions of Risk Category? (Low = within 30 days; Moderate= within 14 days; High= within 7 days): Yes  Discharge Follow Up Appointment Date: 04/22/25  Discharge Follow Up Appointment Scheduled with?: Specialty Care Provider    Future Appointments   Date Time Provider Department Center   5/6/2025  2:30 PM MELANY RUST NURSE [facility]   6/10/2025  3:00 PM Ann Marie Morocho PA-C [facility]    7/7/2025  2:20 PM [facility]   7/9/2025  1:00 PM Florencia Butler MD [facility]   7/22/2025 11:00 AM Davie Erickson MD [facility]    7/23/2025  2:15 PM Phoenix Adams MD [facility]       Outpatient Plan as outlined on AVS reviewed with patient.    For any urgent concerns, please contact our 24 hour nurse triage line: [phone] ([phone])       Estela Raymond RN

## 2025-04-21 NOTE — ANESTHESIA PREPROCEDURE EVALUATION
Anesthesia Pre-Procedure Evaluation    Patient: Pascual Perea   MRN: [id number] : 1940        Procedure : Procedure(s):  RIGHT FRONTO-TEMPORAL CRANIOTOMY, USING OPTICAL TRACKING SYSTEM, WITH NEOPLASM EXCISION          Past Medical History:   Diagnosis Date    Adhesive capsulitis of shoulder     Allergic rhinitis 2008    Problem list name updated by automated process. Provider to review      Anemia 03/10/2014    Anemia, unspecified     Antiplatelet or antithrombotic long-term use     Arrhythmia     Atrial fib/flutter    Atrial fibrillation, unspecified type (H) 2018    CAD (coronary artery disease)     Chemotherapy-induced neutropenia 2024    Coronary artery disease involving native coronary artery of native heart without angina pectoris 2025    Coronary atherosclerosis     Nuc Stress 4/15/16: On both stress and rest images there is a very small size mild intensity apical photopenic defect which is of equal extent and intensity on both stress and rest images. Gated images demonstrated no regional wall motion abnormalities. The left ventricular ejection fraction was calculated to be 61% with stress.      Essential hypertension, benign 2003    Gastroesophageal reflux disease without esophagitis 2016    Gastroparesis     delayed emptying study May 2022    High grade neuroendocrine carcinoma (H) 2024    History of cardioversion 2018    a single synchronized shock of 120 joules restored normal sinus rhythm    History of cardioversion 2019    single shock , 200 joules successul in restoring NSR    History of colonic polyps 2017    Hyperlipidemia LDL goal <70 2011    Hypertension     Hypothyroidism     Hypothyroidism, unspecified type 08/15/2016    Impotence of organic origin     Laceration of finger 2010     left thumb s/p sutures    Numbness and tingling     diabetic neuropathy bilateral feet    BRANDON (obstructive sleep apnea)     intolerant of  CPAP, uses it occasionally.  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 disease, with long-term current use of insulin (H) 02/16/2025    Type 2 diabetes mellitus without complication  (goal A1C<8) 10/24/2015    Typical atrial flutter (H) 04/18/2016    Unspecified hypothyroidism     Vitamin D deficiency 09/03/2011      Past Surgical History:   Procedure Laterality Date    ANESTHESIA CARDIOVERSION N/A 02/13/2019    Procedure: ANESTHESIA CARDIOVERSION;  Surgeon: GENERIC ANESTHESIA PROVIDER;  Location:  OR    ANESTHESIA CARDIOVERSION N/A 05/22/2019    Procedure: ANESTHESIA, FOR CARDIOVERSION;  Surgeon: GENERIC ANESTHESIA PROVIDER;  Location:  OR    CARDIAC SURGERY      bypass in 1997    CARDIOVERSION  04/24/2018    COLONOSCOPY      CORONARY ANGIOGRAPHY ADULT ORDER      4/2/2012    CORONARY ARTERY BYPASS  1997    [facility]    ENDOSCOPIC ULTRASOUND UPPER GASTROINTESTINAL TRACT (GI) N/A 03/14/2019    Procedure: ENDOSCOPIC ULTRASOUND OF UPPER GASTROINTESTINAL TRACT;  Surgeon: Ju Torres MD;  Location:  OR    ESOPHAGOSCOPY, GASTROSCOPY, DUODENOSCOPY (EGD), COMBINED N/A 4/15/2022    Procedure: ESOPHAGOGASTRODUODENOSCOPY (EGD);  Surgeon: Erik Baca DO;  Location:  GI    EXCISE MASS HEAD Left 08/18/2016    Procedure: EXCISE MASS HEAD;  Surgeon: Ivan Hernandez MD;  Location:  SD    HEART CATH, ANGIOPLASTY  04/2012    IR CHEST PORT PLACEMENT > 5 YRS OF AGE  8/9/2024    IR PORT REMOVAL RIGHT  2/17/2025    LAPAROSCOPIC CHOLECYSTECTOMY N/A 03/17/2019    Procedure: LAPAROSCOPIC  CHOLECYSTECTOMY;  Surgeon: Janel Paz MD;  Location:  OR    PHACOEMULSIFICATION CLEAR CORNEA WITH STANDARD INTRAOCULAR LENS IMPLANT Left 2015    Procedure: PHACOEMULSIFICATION CLEAR CORNEA WITH STANDARD INTRAOCULAR LENS IMPLANT;  Surgeon: Nixon Farnsworth MD;  Location:  EC    PHACOEMULSIFICATION CLEAR CORNEA WITH STANDARD INTRAOCULAR LENS IMPLANT Right 2015    Procedure: PHACOEMULSIFICATION CLEAR CORNEA WITH STANDARD INTRAOCULAR LENS IMPLANT;  Surgeon: Nixon Farnsworth MD;  Location:  EC    SEPTOPLASTY, TURBINOPLASTY, COMBINED Bilateral 2016    Procedure: COMBINED SEPTOPLASTY, TURBINOPLASTY;  Surgeon: Ivan Hernandez MD;  Location:  SD    ZZC NONSPECIFIC PROCEDURE      CABG (Confucianist)    ZZC NONSPECIFIC PROCEDURE  2001    stress echo      Allergies   Allergen Reactions    Omeprazole      diarrhea    Pantoprazole      diarrhea      Social History     Tobacco Use    Smoking status: Former     Current packs/day: 0.00     Average packs/day: 1 pack/day for 6.5 years (6.5 ttl pk-yrs)     Types: Cigarettes, Cigars, Pipe     Start date:      Quit date: 1964     Years since quittin.8    Smokeless tobacco: Never    Tobacco comments:     10/16/24 Pt states he will an occasional cigar.    Substance Use Topics    Alcohol use: Not Currently     Comment: couple drinks a year      Wt Readings from Last 1 Encounters:   25 83.9 kg (185 lb)        Echo 2023  Interpretation Summary     The left ventricle is normal in size.  There is mild concentric left ventricular hypertrophy.  Left ventricular systolic function is normal.  The visual ejection fraction is 60-65%.  Mild valvular aortic stenosis.  Compared to prior study, there is no significant change.  ______________________________________________________________________________  Left Ventricle  The left ventricle is normal in size. There is mild concentric left  ventricular hypertrophy. Left ventricular  systolic function is normal. The  visual ejection fraction is 60-65%. Diastolic Doppler findings (E/E' ratio  and/or other parameters) suggest left ventricular filling pressures are  indeterminate. No regional wall motion abnormalities noted.     Right Ventricle  The right ventricle is normal in structure, function and size.     Atria  The left atrium is mildly dilated. Right atrial size is normal. There is no  atrial shunt seen.     Mitral Valve  The mitral valve is normal in structure and function. There is trace mitral  regurgitation.     Tricuspid Valve  The tricuspid valve is normal in structure and function. There is trace  tricuspid regurgitation. IVC diameter <2.1 cm collapsing >50% with sniff  suggests a normal RA pressure of 3 mmHg. Right ventricular systolic pressure  could not be approximated due to inadequate tricuspid regurgitation.     Aortic Valve  No aortic regurgitation is present. The calculated aortic valve are is 1.4  cm^2. The peak AoV pressure gradient is 23.0 mmHg. The mean AoV pressure  gradient is 13.0 mmHg. Mild valvular aortic stenosis.     Pulmonic Valve  The pulmonic valve is not well seen, but is grossly normal. There is trace  pulmonic valvular regurgitation.     Vessels  Normal size aorta.     Pericardium  There is no pericardial effusion.     Rhythm  The rhythm was atrial fibrillation.     Nuclear medicine study - 6/2021   The nuclear stress test is probably abnormal.    There is a small area of nontransmural infarction in the apical segment(s) of the left ventricle.    Left ventricular function is normal.    The left ventricular ejection fraction at stress is 74%.    A prior study was conducted on 4/18/2018.  This study has no change when compared with the prior study.        EKG  Wide QRS rhythm   Left ventricular hypertrophy with QRS widening and repolarization abnormality ( Sokolow-Watson , Bill product , Romhilt-Vasquez )   Abnormal ECG   When compared with ECG of 14-Oct-2022  22:58,   Wide QRS rhythm has replaced Sinus rhythm   Vent. rate has increased by  28 bpm     EXAM: MR BRAIN W/O and W CONTRAST  LOCATION: [facility]  DATE: 4/16/2025     INDICATION: Mass. stealth protocol included please  COMPARISON: CT 4/16/2025.  CONTRAST: 15mL Gadavist  TECHNIQUE: Routine multiplanar multisequence head MRI without and with intravenous contrast. Thin section imaging performed.     FINDINGS:  INTRACRANIAL CONTENTS: Large mass in the right temporal lobe measures 5.3 x 3.8 x 4.3 cm (AP, TR, CC). The lesion demonstrates irregular heterogenous enhancement, intermediate signal on T1-weighted images, solid anterior component, cystic smaller   posterior hemorrhage fluid level. Extensive surrounding vasogenic edema. Right-to-left midline shift, 4 to 5 mm.      No acute or subacute infarct. Scattered nonspecific T2/FLAIR hyperintensities within the cerebral white matter most consistent with mild chronic microvascular ischemic change. Mild generalized cerebral atrophy. No hydrocephalus. Normal position of the   cerebellar tonsils.      SELLA: No abnormality accounting for technique.     OSSEOUS STRUCTURES/SOFT TISSUES: Normal marrow signal. The major intracranial vascular flow voids are maintained.      ORBITS: No abnormality accounting for technique.      SINUSES/MASTOIDS: No paranasal sinus mucosal disease. No middle ear or mastoid effusion.                                                                       IMPRESSION:  1.  Large mass right temporal lobe with extensive surrounding vasogenic edema, measures 5.3 x 3.8 x 4.3 cm. Hemorrhagic signal within the mass.  2.  Right-to-left midline shift 4 to 5 mm.    CTA of head and neck  EXAM: CTA HEAD NECK W CONTRAST  LOCATION: [facility]  DATE: 4/17/2025     INDICATION: right temporal mass  COMPARISON: Brain MRI 4/16/2025  CONTRAST: 75mL Isovue 370  TECHNIQUE: Head and neck CT angiogram with IV contrast.  Noncontrast head CT followed by axial helical CT images of the head and neck vessels obtained during the arterial phase of intravenous contrast administration. Axial 2D reconstructed images and   multiplanar 3D MIP reconstructed images of the head and neck vessels were performed by the technologist. Dose reduction techniques were used. All stenosis measurements made according to NASCET criteria unless otherwise specified.     FINDINGS:      HEAD CTA:  ANTERIOR CIRCULATION: Calcified atherosclerotic plaques in bilateral carotid siphons without without high-grade stenosis/occlusion, aneurysm, or high flow vascular malformation. Standard [place] of Fisher anatomy. Anterior displacement of the MCA. Again   demonstrated is enhancing right temporal mass with surrounding vasogenic edema. Unchanged 6 mm leftward midline shift. Refer to earlier same day MRI for details of the mass     POSTERIOR CIRCULATION: No stenosis/occlusion, aneurysm, or high flow vascular malformation. Balanced vertebral arteries supply a normal basilar artery.      DURAL VENOUS SINUSES: Expected enhancement of the major dural venous sinuses.     NECK CTA:  RIGHT CAROTID: Calcified atherosclerotic plaques at the bifurcation without high-grade stenosis or dissection.     LEFT CAROTID: Calcified atherosclerotic plaques at the bifurcation without high-grade stenosis or dissection.     VERTEBRAL ARTERIES: No focal stenosis or dissection. Balanced vertebral arteries.     AORTIC ARCH: Classic aortic arch anatomy with no significant stenosis at the origin of the great vessels.     NONVASCULAR STRUCTURES: Sternotomy changes. Bilateral flight vertebrae at T2.                                                                      IMPRESSION:   HEAD CTA:  1.  Patent major intracranial arteries without large vessel occlusion, high-grade stenosis or aneurysm.  2.  Anterior displacement of the right MCA secondary to right temporal mass.  3.  Enhancing right temporal  mass with surrounding vasogenic edema. Unchanged 6 mm leftward midline shift. Refer to brain MRI from yesterday for details.     NECK CTA:  Patent major cervical arteries without high-grade stenosis or dissection.       Head CT  EXAM: CT HEAD W/O CONTRAST  LOCATION: [facility]  DATE: 4/16/2025     INDICATION: Fall, altered mental status.  COMPARISON: None.  TECHNIQUE: Routine CT Head without IV contrast. Multiplanar reformats. Dose reduction techniques were used.     FINDINGS:  INTRACRANIAL CONTENTS: Large mass in the right anterior temporal lobe measures 5.3 x 4 x 3.8 cm (AP, TR, CC). Significant surrounding vasogenic edema extends into the surrounding white matter and basal ganglia region resulting in right to left midline   shift of 6.6 mm. A posterior component of the mass appears cystic with the hemorrhagic fluid level. No CT evidence of acute infarct. Mild presumed chronic small vessel ischemic changes. Effacement of right lateral ventricle. No hydrocephalus.     VISUALIZED ORBITS/SINUSES/MASTOIDS: No intraorbital abnormality. No paranasal sinus mucosal disease. No middle ear or mastoid effusion.     BONES/SOFT TISSUES: No acute abnormality.                                                                      IMPRESSION:  1.  Large mass in the right anterior temporal lobe measures 5.3 x 4 x 3.8 cm. Associated hemorrhage within the posterior (cystic) aspect of this mass.  2.  Significant surrounding vasogenic edema.  3.  Right to left midline shift 6.6 mm.  4.  Recommend MRI without and with intravenous contrast.     Anesthesia Evaluation   Pt has had prior anesthetic.     History of anesthetic complications       ROS/MED HX  ENT/Pulmonary: Comment: Hearing loss    (+) sleep apnea,          allergic rhinitis,                          (-) recent URI   Neurologic: Comment: Admitted 4/16/2025 with lethargy, somnolence, word finding difficulty, memory issues and generalized leg weakness -  right intracranial mass   (-) no seizures, no CVA and migraines   Cardiovascular: Comment: LVH    (+)  hypertension- -  CAD -  CABG- -   Taking blood thinners (Eliquis)                     dysrhythmias, a-fib and a-flutter,       pulmonary hypertension,   (-) CHF and orthopnea/PND   METS/Exercise Tolerance:     Hematologic:       Musculoskeletal:       GI/Hepatic: Comment: Gastroparesis     (+) GERD,            liver disease (Fatty liver, cirrhosis),       Renal/Genitourinary:     (+) renal disease, type: CRI,            Endo: Comment: Blood glucose significantly elevated - likely related to steroid use     (+)  type II DM,   Using insulin,     thyroid problem, hypothyroidism,           Psychiatric/Substance Use:  - neg psychiatric ROS     Infectious Disease:  - neg infectious disease ROS     Malignancy:   (+) Malignancy, History of Other.Other CA Neuroendocrine tumor of parotid gland s/p chemo/ rads status post.    Other:            Physical Exam    Airway        Mallampati: III   TM distance: > 3 FB   Neck ROM: limited   Mouth opening: > 3 cm    Respiratory Devices and Support         Dental       (+) Modest Abnormalities - crowns, retainers, 1 or 2 missing teeth      Cardiovascular          Rhythm and rate: regular and normal   (+) murmur       Pulmonary   pulmonary exam normal        breath sounds clear to auscultation           OUTSIDE LABS:  CBC:   Lab Results   Component Value Date    WBC 5.4 04/16/2025    WBC 3.8 (L) 02/14/2025    HGB 12.7 (L) 04/16/2025    HGB 9.9 (L) 02/14/2025    HCT 37.0 (L) 04/16/2025    HCT 30.3 (L) 02/14/2025     (L) 04/16/2025    PLT 86 (L) 02/14/2025     BMP:   Lab Results   Component Value Date     04/18/2025     04/17/2025    POTASSIUM 4.7 04/18/2025    POTASSIUM 4.7 04/18/2025    CHLORIDE 100 04/18/2025    CHLORIDE 104 04/17/2025    CO2 19 (L) 04/18/2025    CO2 21 (L) 04/17/2025    BUN 22.8 04/18/2025    BUN 24.1 (H) 04/17/2025    CR 1.04 04/18/2025    CR 1.05  "04/17/2025     (H) 04/18/2025     (H) 04/18/2025     COAGS:   Lab Results   Component Value Date    PTT 52 (H) 04/18/2018    INR 1.43 (H) 05/22/2019     POC:   Lab Results   Component Value Date     (H) 01/09/2020     HEPATIC:   Lab Results   Component Value Date    ALBUMIN 4.3 02/10/2025    PROTTOTAL 6.7 02/10/2025    ALT 16 02/10/2025    AST 25 02/10/2025    ALKPHOS 60 02/10/2025    BILITOTAL 0.6 02/10/2025     OTHER:   Lab Results   Component Value Date    LACT 2.2 (H) 10/30/2024    A1C 6.9 (H) 02/10/2025    STARR 10.0 04/18/2025    PHOS 2.9 07/16/2024    MAG 1.6 (L) 10/30/2024    LIPASE 121 03/13/2019    TSH 1.81 02/10/2025    T4 1.30 04/17/2024    SED 6 01/10/2022       Anesthesia Plan    ASA Status:  3    NPO Status:  NPO Appropriate    Anesthesia Type: General.     - Airway: ETT   Induction: Propofol.   Maintenance: Balanced.   Techniques and Equipment:     - Airway: Video-Laryngoscope     - Lines/Monitors: 2nd IV, Arterial Line     Consents    Anesthesia Plan(s) and associated risks, benefits, and realistic alternatives discussed. Questions answered and patient/representative(s) expressed understanding.     - Discussed:     - Discussed with:  Patient            Postoperative Care    Pain management: IV analgesics, Multi-modal analgesia.   PONV prophylaxis: Ondansetron (or other 5HT-3), Dexamethasone or Solumedrol     Comments:               Sidney Mackey MD    Clinically Significant Risk Factors Present on Admission                   # Hypertension: Noted on problem list          # DMII: A1C = 6.9 % (Ref range: 0.0 - 5.6 %) within past 6 months    # Overweight: Estimated body mass index is 25.8 kg/m  as calculated from the following:    Height as of 4/16/25: 1.803 m (5' 11\").    Weight as of 4/16/25: 83.9 kg (185 lb).       # Financial/Environmental Concerns:             "

## 2025-04-22 ENCOUNTER — APPOINTMENT (OUTPATIENT)
Dept: CT IMAGING | Facility: CLINIC | Age: 85
DRG: 025 | End: 2025-04-22
Attending: NURSE PRACTITIONER
Payer: COMMERCIAL

## 2025-04-22 ENCOUNTER — ANESTHESIA (OUTPATIENT)
Dept: SURGERY | Facility: CLINIC | Age: 85
End: 2025-04-22
Payer: COMMERCIAL

## 2025-04-22 ENCOUNTER — APPOINTMENT (OUTPATIENT)
Dept: GENERAL RADIOLOGY | Facility: CLINIC | Age: 85
DRG: 025 | End: 2025-04-22
Attending: ANESTHESIOLOGY
Payer: COMMERCIAL

## 2025-04-22 ENCOUNTER — HOSPITAL ENCOUNTER (INPATIENT)
Facility: CLINIC | Age: 85
DRG: 025 | End: 2025-04-22
Attending: NEUROLOGICAL SURGERY | Admitting: NEUROLOGICAL SURGERY
Payer: COMMERCIAL

## 2025-04-22 DIAGNOSIS — E11.22 TYPE 2 DIABETES MELLITUS WITH STAGE 3B CHRONIC KIDNEY DISEASE, WITH LONG-TERM CURRENT USE OF INSULIN (H): ICD-10-CM

## 2025-04-22 DIAGNOSIS — G93.89 BRAIN MASS: Primary | ICD-10-CM

## 2025-04-22 DIAGNOSIS — Z98.890 S/P CRANIOTOMY: ICD-10-CM

## 2025-04-22 DIAGNOSIS — N18.32 TYPE 2 DIABETES MELLITUS WITH STAGE 3B CHRONIC KIDNEY DISEASE, WITH LONG-TERM CURRENT USE OF INSULIN (H): ICD-10-CM

## 2025-04-22 DIAGNOSIS — Z79.4 TYPE 2 DIABETES MELLITUS WITH STAGE 3B CHRONIC KIDNEY DISEASE, WITH LONG-TERM CURRENT USE OF INSULIN (H): ICD-10-CM

## 2025-04-22 DIAGNOSIS — R56.9 SEIZURES (H): ICD-10-CM

## 2025-04-22 LAB
ABO + RH BLD: NORMAL
ALBUMIN SERPL BCG-MCNC: 3.8 G/DL (ref 3.5–5.2)
ALLEN'S TEST: ABNORMAL
ALP SERPL-CCNC: 61 U/L (ref 40–150)
ALT SERPL W P-5'-P-CCNC: 30 U/L (ref 0–70)
ANION GAP SERPL CALCULATED.3IONS-SCNC: 20 MMOL/L (ref 7–15)
ANION GAP SERPL CALCULATED.3IONS-SCNC: 30 MMOL/L (ref 7–15)
APTT PPP: 21 SECONDS (ref 22–38)
APTT PPP: 23 SECONDS (ref 22–38)
AST SERPL W P-5'-P-CCNC: 30 U/L (ref 0–45)
ATRIAL RATE - MUSE: 52 BPM
BASE EXCESS BLDA CALC-SCNC: -18 MMOL/L (ref -3–3)
BASE EXCESS BLDA CALC-SCNC: -3.9 MMOL/L (ref -3–3)
BASE EXCESS BLDV CALC-SCNC: -18.8 MMOL/L (ref -3–3)
BASOPHILS # BLD AUTO: 0.1 10E3/UL (ref 0–0.2)
BASOPHILS NFR BLD AUTO: 0 %
BILIRUB SERPL-MCNC: 0.6 MG/DL
BLD GP AB SCN SERPL QL: NEGATIVE
BUN SERPL-MCNC: 25.4 MG/DL (ref 8–23)
BUN SERPL-MCNC: 26.5 MG/DL (ref 8–23)
CA-I BLD-MCNC: 4.7 MG/DL (ref 4.4–5.2)
CALCIUM SERPL-MCNC: 9.1 MG/DL (ref 8.8–10.4)
CALCIUM SERPL-MCNC: 9.8 MG/DL (ref 8.8–10.4)
CHLORIDE SERPL-SCNC: 100 MMOL/L (ref 98–107)
CHLORIDE SERPL-SCNC: 102 MMOL/L (ref 98–107)
CPB POCT: NO
CREAT SERPL-MCNC: 1.3 MG/DL (ref 0.67–1.17)
CREAT SERPL-MCNC: 1.32 MG/DL (ref 0.67–1.17)
DIASTOLIC BLOOD PRESSURE - MUSE: NORMAL MMHG
EGFRCR SERPLBLD CKD-EPI 2021: 54 ML/MIN/1.73M2
EGFRCR SERPLBLD CKD-EPI 2021: 55 ML/MIN/1.73M2
EOSINOPHIL # BLD AUTO: 0 10E3/UL (ref 0–0.7)
EOSINOPHIL NFR BLD AUTO: 0 %
ERYTHROCYTE [DISTWIDTH] IN BLOOD BY AUTOMATED COUNT: 14.6 % (ref 10–15)
ERYTHROCYTE [DISTWIDTH] IN BLOOD BY AUTOMATED COUNT: 14.8 % (ref 10–15)
FIBRINOGEN PPP-MCNC: 215 MG/DL (ref 170–510)
GLUCOSE BLDC GLUCOMTR-MCNC: 104 MG/DL (ref 70–99)
GLUCOSE BLDC GLUCOMTR-MCNC: 154 MG/DL (ref 70–99)
GLUCOSE BLDC GLUCOMTR-MCNC: 187 MG/DL (ref 70–99)
GLUCOSE BLDC GLUCOMTR-MCNC: 234 MG/DL (ref 70–99)
GLUCOSE BLDC GLUCOMTR-MCNC: 235 MG/DL (ref 70–99)
GLUCOSE BLDC GLUCOMTR-MCNC: 297 MG/DL (ref 70–99)
GLUCOSE BLDC GLUCOMTR-MCNC: 346 MG/DL (ref 70–99)
GLUCOSE SERPL-MCNC: 110 MG/DL (ref 70–99)
GLUCOSE SERPL-MCNC: 301 MG/DL (ref 70–99)
GLUCOSE SERPL-MCNC: 343 MG/DL (ref 70–99)
HCO3 BLD-SCNC: 21 MMOL/L (ref 21–28)
HCO3 BLDA-SCNC: 12 MMOL/L (ref 21–28)
HCO3 BLDV-SCNC: 12 MMOL/L (ref 21–28)
HCO3 SERPL-SCNC: 11 MMOL/L (ref 22–29)
HCO3 SERPL-SCNC: 19 MMOL/L (ref 22–29)
HCT VFR BLD AUTO: 31.8 % (ref 40–53)
HCT VFR BLD AUTO: 38.4 % (ref 40–53)
HCT VFR BLD CALC: 33 %
HGB BLD-MCNC: 10.7 G/DL (ref 13.3–17.7)
HGB BLD-MCNC: 11.2 G/DL (ref 13.3–17.7)
HGB BLD-MCNC: 11.9 G/DL (ref 13.3–17.7)
HOLD SPECIMEN: NORMAL
IMM GRANULOCYTES # BLD: 0.6 10E3/UL
IMM GRANULOCYTES NFR BLD: 4 %
INR PPP: 1.21 (ref 0.85–1.15)
INR PPP: 1.3 (ref 0.85–1.15)
INTERPRETATION ECG - MUSE: NORMAL
LACTATE SERPL-SCNC: 17 MMOL/L (ref 0.7–2)
LACTATE SERPL-SCNC: 6.7 MMOL/L (ref 0.7–2)
LYMPHOCYTES # BLD AUTO: 3.1 10E3/UL (ref 0.8–5.3)
LYMPHOCYTES NFR BLD AUTO: 21 %
MAGNESIUM SERPL-MCNC: 2 MG/DL (ref 1.7–2.3)
MCH RBC QN AUTO: 30.3 PG (ref 26.5–33)
MCH RBC QN AUTO: 30.5 PG (ref 26.5–33)
MCHC RBC AUTO-ENTMCNC: 31 G/DL (ref 31.5–36.5)
MCHC RBC AUTO-ENTMCNC: 33.6 G/DL (ref 31.5–36.5)
MCV RBC AUTO: 91 FL (ref 78–100)
MCV RBC AUTO: 98 FL (ref 78–100)
MONOCYTES # BLD AUTO: 0.7 10E3/UL (ref 0–1.3)
MONOCYTES NFR BLD AUTO: 5 %
NEUTROPHILS # BLD AUTO: 10.5 10E3/UL (ref 1.6–8.3)
NEUTROPHILS NFR BLD AUTO: 71 %
NRBC # BLD AUTO: 0 10E3/UL
NRBC BLD AUTO-RTO: 0 /100
O2/TOTAL GAS SETTING VFR VENT: 0 %
O2/TOTAL GAS SETTING VFR VENT: 30 %
OXYHGB MFR BLDA: 98 % (ref 92–100)
OXYHGB MFR BLDV: 98 % (ref 70–75)
P AXIS - MUSE: 57 DEGREES
PCO2 BLD: 35 MM HG (ref 35–45)
PCO2 BLDA: 46 MM HG (ref 35–45)
PCO2 BLDV: 48 MM HG (ref 40–50)
PEEP: 5 CM H2O
PH BLD: 7.38 [PH] (ref 7.35–7.45)
PH BLDA: 7.03 [PH] (ref 7.35–7.45)
PH BLDV: 7.01 [PH] (ref 7.32–7.43)
PHOSPHATE SERPL-MCNC: 5.7 MG/DL (ref 2.5–4.5)
PLATELET # BLD AUTO: 157 10E3/UL (ref 150–450)
PLATELET # BLD AUTO: 170 10E3/UL (ref 150–450)
PO2 BLD: 154 MM HG (ref 80–105)
PO2 BLDA: 486 MM HG (ref 80–105)
PO2 BLDV: 393 MM HG (ref 25–47)
POTASSIUM BLD-SCNC: 3.7 MMOL/L (ref 3.4–5.3)
POTASSIUM SERPL-SCNC: 3.9 MMOL/L (ref 3.4–5.3)
POTASSIUM SERPL-SCNC: 4.1 MMOL/L (ref 3.4–5.3)
POTASSIUM SERPL-SCNC: 4.5 MMOL/L (ref 3.4–5.3)
PR INTERVAL - MUSE: NORMAL MS
PROT SERPL-MCNC: 6 G/DL (ref 6.4–8.3)
QRS DURATION - MUSE: 136 MS
QT - MUSE: 432 MS
QTC - MUSE: 495 MS
R AXIS - MUSE: 79 DEGREES
RBC # BLD AUTO: 3.51 10E6/UL (ref 4.4–5.9)
RBC # BLD AUTO: 3.93 10E6/UL (ref 4.4–5.9)
SAO2 % BLDA: 100 % (ref 95–96)
SAO2 % BLDA: 100 % (ref 95–96)
SAO2 % BLDV: 100 % (ref 70–75)
SODIUM BLD-SCNC: 139 MMOL/L (ref 135–145)
SODIUM SERPL-SCNC: 139 MMOL/L (ref 135–145)
SODIUM SERPL-SCNC: 143 MMOL/L (ref 135–145)
SPECIMEN EXP DATE BLD: NORMAL
SYSTOLIC BLOOD PRESSURE - MUSE: NORMAL MMHG
T AXIS - MUSE: 202 DEGREES
TROPONIN T SERPL HS-MCNC: 39 NG/L
TROPONIN T SERPL HS-MCNC: 53 NG/L
VENTRICULAR RATE- MUSE: 79 BPM
WBC # BLD AUTO: 14.9 10E3/UL (ref 4–11)
WBC # BLD AUTO: 18.7 10E3/UL (ref 4–11)

## 2025-04-22 PROCEDURE — 99207 PR NO BILLABLE SERVICE THIS VISIT: CPT

## 2025-04-22 PROCEDURE — 250N000011 HC RX IP 250 OP 636: Performed by: NURSE PRACTITIONER

## 2025-04-22 PROCEDURE — 250N000011 HC RX IP 250 OP 636: Performed by: REGISTERED NURSE

## 2025-04-22 PROCEDURE — 3E043XZ INTRODUCTION OF VASOPRESSOR INTO CENTRAL VEIN, PERCUTANEOUS APPROACH: ICD-10-PCS | Performed by: NEUROLOGICAL SURGERY

## 2025-04-22 PROCEDURE — 84132 ASSAY OF SERUM POTASSIUM: CPT

## 2025-04-22 PROCEDURE — 85610 PROTHROMBIN TIME: CPT | Performed by: STUDENT IN AN ORGANIZED HEALTH CARE EDUCATION/TRAINING PROGRAM

## 2025-04-22 PROCEDURE — 86901 BLOOD TYPING SEROLOGIC RH(D): CPT | Performed by: NURSE PRACTITIONER

## 2025-04-22 PROCEDURE — 250N000009 HC RX 250: Performed by: NEUROLOGICAL SURGERY

## 2025-04-22 PROCEDURE — C1713 ANCHOR/SCREW BN/BN,TIS/BN: HCPCS | Performed by: NEUROLOGICAL SURGERY

## 2025-04-22 PROCEDURE — 250N000011 HC RX IP 250 OP 636: Performed by: NEUROLOGICAL SURGERY

## 2025-04-22 PROCEDURE — 258N000003 HC RX IP 258 OP 636: Performed by: REGISTERED NURSE

## 2025-04-22 PROCEDURE — 85018 HEMOGLOBIN: CPT | Performed by: STUDENT IN AN ORGANIZED HEALTH CARE EDUCATION/TRAINING PROGRAM

## 2025-04-22 PROCEDURE — 93010 ELECTROCARDIOGRAM REPORT: CPT | Performed by: INTERNAL MEDICINE

## 2025-04-22 PROCEDURE — 85610 PROTHROMBIN TIME: CPT | Performed by: NEUROLOGICAL SURGERY

## 2025-04-22 PROCEDURE — 84100 ASSAY OF PHOSPHORUS: CPT | Performed by: STUDENT IN AN ORGANIZED HEALTH CARE EDUCATION/TRAINING PROGRAM

## 2025-04-22 PROCEDURE — 250N000009 HC RX 250: Performed by: REGISTERED NURSE

## 2025-04-22 PROCEDURE — 999N000157 HC STATISTIC RCP TIME EA 10 MIN

## 2025-04-22 PROCEDURE — 8E09XBZ COMPUTER ASSISTED PROCEDURE OF HEAD AND NECK REGION: ICD-10-PCS | Performed by: NEUROLOGICAL SURGERY

## 2025-04-22 PROCEDURE — 99291 CRITICAL CARE FIRST HOUR: CPT | Mod: 24 | Performed by: ANESTHESIOLOGY

## 2025-04-22 PROCEDURE — 61510 CRNEC TREPH EXC BRN TUM STTL: CPT | Performed by: NEUROLOGICAL SURGERY

## 2025-04-22 PROCEDURE — 370N000017 HC ANESTHESIA TECHNICAL FEE, PER MIN: Performed by: NEUROLOGICAL SURGERY

## 2025-04-22 PROCEDURE — 250N000009 HC RX 250

## 2025-04-22 PROCEDURE — 258N000003 HC RX IP 258 OP 636: Performed by: ANESTHESIOLOGY

## 2025-04-22 PROCEDURE — 82040 ASSAY OF SERUM ALBUMIN: CPT | Performed by: NEUROLOGICAL SURGERY

## 2025-04-22 PROCEDURE — 85384 FIBRINOGEN ACTIVITY: CPT | Performed by: STUDENT IN AN ORGANIZED HEALTH CARE EDUCATION/TRAINING PROGRAM

## 2025-04-22 PROCEDURE — 82805 BLOOD GASES W/O2 SATURATION: CPT | Performed by: STUDENT IN AN ORGANIZED HEALTH CARE EDUCATION/TRAINING PROGRAM

## 2025-04-22 PROCEDURE — 93005 ELECTROCARDIOGRAM TRACING: CPT

## 2025-04-22 PROCEDURE — 83735 ASSAY OF MAGNESIUM: CPT | Performed by: STUDENT IN AN ORGANIZED HEALTH CARE EDUCATION/TRAINING PROGRAM

## 2025-04-22 PROCEDURE — 61781 SCAN PROC CRANIAL INTRA: CPT | Performed by: NEUROLOGICAL SURGERY

## 2025-04-22 PROCEDURE — 200N000001 HC R&B ICU

## 2025-04-22 PROCEDURE — 83605 ASSAY OF LACTIC ACID: CPT | Performed by: NEUROLOGICAL SURGERY

## 2025-04-22 PROCEDURE — 250N000025 HC SEVOFLURANE, PER MIN: Performed by: NEUROLOGICAL SURGERY

## 2025-04-22 PROCEDURE — 999N000009 HC STATISTIC AIRWAY CARE

## 2025-04-22 PROCEDURE — 82947 ASSAY GLUCOSE BLOOD QUANT: CPT | Performed by: ANESTHESIOLOGY

## 2025-04-22 PROCEDURE — 250N000013 HC RX MED GY IP 250 OP 250 PS 637: Performed by: STUDENT IN AN ORGANIZED HEALTH CARE EDUCATION/TRAINING PROGRAM

## 2025-04-22 PROCEDURE — 999N000141 HC STATISTIC PRE-PROCEDURE NURSING ASSESSMENT: Performed by: NEUROLOGICAL SURGERY

## 2025-04-22 PROCEDURE — 85048 AUTOMATED LEUKOCYTE COUNT: CPT | Performed by: NEUROLOGICAL SURGERY

## 2025-04-22 PROCEDURE — 250N000009 HC RX 250: Performed by: NURSE ANESTHETIST, CERTIFIED REGISTERED

## 2025-04-22 PROCEDURE — 5A1945Z RESPIRATORY VENTILATION, 24-96 CONSECUTIVE HOURS: ICD-10-PCS | Performed by: NEUROLOGICAL SURGERY

## 2025-04-22 PROCEDURE — 94002 VENT MGMT INPAT INIT DAY: CPT

## 2025-04-22 PROCEDURE — 250N000011 HC RX IP 250 OP 636: Mod: JZ | Performed by: STUDENT IN AN ORGANIZED HEALTH CARE EDUCATION/TRAINING PROGRAM

## 2025-04-22 PROCEDURE — 71045 X-RAY EXAM CHEST 1 VIEW: CPT

## 2025-04-22 PROCEDURE — 250N000011 HC RX IP 250 OP 636: Performed by: NURSE ANESTHETIST, CERTIFIED REGISTERED

## 2025-04-22 PROCEDURE — 250N000013 HC RX MED GY IP 250 OP 250 PS 637: Performed by: ANESTHESIOLOGY

## 2025-04-22 PROCEDURE — 80051 ELECTROLYTE PANEL: CPT | Performed by: ANESTHESIOLOGY

## 2025-04-22 PROCEDURE — 88307 TISSUE EXAM BY PATHOLOGIST: CPT | Mod: TC | Performed by: NEUROLOGICAL SURGERY

## 2025-04-22 PROCEDURE — C1763 CONN TISS, NON-HUMAN: HCPCS | Performed by: NEUROLOGICAL SURGERY

## 2025-04-22 PROCEDURE — 82805 BLOOD GASES W/O2 SATURATION: CPT | Performed by: NEUROLOGICAL SURGERY

## 2025-04-22 PROCEDURE — 85730 THROMBOPLASTIN TIME PARTIAL: CPT | Performed by: STUDENT IN AN ORGANIZED HEALTH CARE EDUCATION/TRAINING PROGRAM

## 2025-04-22 PROCEDURE — 00B70ZZ EXCISION OF CEREBRAL HEMISPHERE, OPEN APPROACH: ICD-10-PCS | Performed by: NEUROLOGICAL SURGERY

## 2025-04-22 PROCEDURE — 85730 THROMBOPLASTIN TIME PARTIAL: CPT | Performed by: NEUROLOGICAL SURGERY

## 2025-04-22 PROCEDURE — 84484 ASSAY OF TROPONIN QUANT: CPT | Performed by: NEUROLOGICAL SURGERY

## 2025-04-22 PROCEDURE — 258N000003 HC RX IP 258 OP 636: Performed by: NURSE ANESTHETIST, CERTIFIED REGISTERED

## 2025-04-22 PROCEDURE — 360N000079 HC SURGERY LEVEL 6, PER MIN: Performed by: NEUROLOGICAL SURGERY

## 2025-04-22 PROCEDURE — 70450 CT HEAD/BRAIN W/O DYE: CPT

## 2025-04-22 PROCEDURE — 999N000254 HC STATISTIC VENTILATOR TRANSFER

## 2025-04-22 PROCEDURE — 83605 ASSAY OF LACTIC ACID: CPT | Performed by: STUDENT IN AN ORGANIZED HEALTH CARE EDUCATION/TRAINING PROGRAM

## 2025-04-22 PROCEDURE — 82947 ASSAY GLUCOSE BLOOD QUANT: CPT | Performed by: STUDENT IN AN ORGANIZED HEALTH CARE EDUCATION/TRAINING PROGRAM

## 2025-04-22 PROCEDURE — 250N000013 HC RX MED GY IP 250 OP 250 PS 637: Performed by: REGISTERED NURSE

## 2025-04-22 PROCEDURE — 82330 ASSAY OF CALCIUM: CPT | Performed by: STUDENT IN AN ORGANIZED HEALTH CARE EDUCATION/TRAINING PROGRAM

## 2025-04-22 PROCEDURE — 250N000012 HC RX MED GY IP 250 OP 636 PS 637: Performed by: STUDENT IN AN ORGANIZED HEALTH CARE EDUCATION/TRAINING PROGRAM

## 2025-04-22 PROCEDURE — 710N000010 HC RECOVERY PHASE 1, LEVEL 2, PER MIN: Performed by: NEUROLOGICAL SURGERY

## 2025-04-22 PROCEDURE — 272N000001 HC OR GENERAL SUPPLY STERILE: Performed by: NEUROLOGICAL SURGERY

## 2025-04-22 DEVICE — IMP BURR HOLE COVER UNIV NEURO 3 OD10 MM CRANIAL LOW PROF: Type: IMPLANTABLE DEVICE | Site: CRANIAL | Status: FUNCTIONAL

## 2025-04-22 DEVICE — IMPLANTABLE DEVICE: Type: IMPLANTABLE DEVICE | Site: CRANIAL | Status: FUNCTIONAL

## 2025-04-22 DEVICE — SCREW BONE NEURO 3 AXIS 4X1.5MM 56-15934: Type: IMPLANTABLE DEVICE | Site: CRANIAL | Status: FUNCTIONAL

## 2025-04-22 DEVICE — GRAFT DURAGEN DURAL MATRIX 4X5CM ID-4501: Type: IMPLANTABLE DEVICE | Site: CRANIAL | Status: FUNCTIONAL

## 2025-04-22 DEVICE — DURAGEN® DURAL GRAFT MATRIX 5 PK 3X3 DOM
Type: IMPLANTABLE DEVICE | Site: CRANIAL | Status: FUNCTIONAL
Brand: DURAGEN®

## 2025-04-22 RX ORDER — ROPIVACAINE IN 0.9% SOD CHL/PF 0.1 %
.01-.2 PLASTIC BAG, INJECTION (ML) EPIDURAL CONTINUOUS
Status: DISCONTINUED | OUTPATIENT
Start: 2025-04-22 | End: 2025-04-25

## 2025-04-22 RX ORDER — CEFAZOLIN SODIUM/WATER 2 G/20 ML
2 SYRINGE (ML) INTRAVENOUS
Status: COMPLETED | OUTPATIENT
Start: 2025-04-22 | End: 2025-04-22

## 2025-04-22 RX ORDER — LEVETIRACETAM 10 MG/ML
1000 INJECTION INTRAVASCULAR EVERY 12 HOURS
Status: DISCONTINUED | OUTPATIENT
Start: 2025-04-22 | End: 2025-04-23

## 2025-04-22 RX ORDER — MAGNESIUM HYDROXIDE 1200 MG/15ML
LIQUID ORAL PRN
Status: DISCONTINUED | OUTPATIENT
Start: 2025-04-22 | End: 2025-04-22 | Stop reason: HOSPADM

## 2025-04-22 RX ORDER — HYDROMORPHONE HCL IN WATER/PF 6 MG/30 ML
0.2 PATIENT CONTROLLED ANALGESIA SYRINGE INTRAVENOUS EVERY 5 MIN PRN
Status: CANCELLED | OUTPATIENT
Start: 2025-04-22

## 2025-04-22 RX ORDER — ONDANSETRON 4 MG/1
4 TABLET, ORALLY DISINTEGRATING ORAL EVERY 6 HOURS PRN
Status: DISCONTINUED | OUTPATIENT
Start: 2025-04-22 | End: 2025-05-04 | Stop reason: HOSPADM

## 2025-04-22 RX ORDER — CEFAZOLIN SODIUM/WATER 2 G/20 ML
2 SYRINGE (ML) INTRAVENOUS SEE ADMIN INSTRUCTIONS
Status: DISCONTINUED | OUTPATIENT
Start: 2025-04-22 | End: 2025-04-22 | Stop reason: HOSPADM

## 2025-04-22 RX ORDER — LABETALOL HYDROCHLORIDE 5 MG/ML
10 INJECTION, SOLUTION INTRAVENOUS EVERY 10 MIN PRN
Status: DISCONTINUED | OUTPATIENT
Start: 2025-04-22 | End: 2025-05-04 | Stop reason: HOSPADM

## 2025-04-22 RX ORDER — ONDANSETRON 2 MG/ML
INJECTION INTRAMUSCULAR; INTRAVENOUS PRN
Status: DISCONTINUED | OUTPATIENT
Start: 2025-04-22 | End: 2025-04-22

## 2025-04-22 RX ORDER — NOREPINEPHRINE BITARTRATE 0.02 MG/ML
.01-.6 INJECTION, SOLUTION INTRAVENOUS CONTINUOUS
Status: DISCONTINUED | OUTPATIENT
Start: 2025-04-22 | End: 2025-04-22 | Stop reason: DRUGHIGH

## 2025-04-22 RX ORDER — EPHEDRINE SULFATE 50 MG/ML
INJECTION, SOLUTION INTRAMUSCULAR; INTRAVENOUS; SUBCUTANEOUS PRN
Status: DISCONTINUED | OUTPATIENT
Start: 2025-04-22 | End: 2025-04-22

## 2025-04-22 RX ORDER — FENTANYL CITRATE 0.05 MG/ML
25 INJECTION, SOLUTION INTRAMUSCULAR; INTRAVENOUS EVERY 5 MIN PRN
Status: CANCELLED | OUTPATIENT
Start: 2025-04-22

## 2025-04-22 RX ORDER — DEXAMETHASONE SODIUM PHOSPHATE 10 MG/ML
8 INJECTION, SOLUTION INTRAMUSCULAR; INTRAVENOUS EVERY 12 HOURS
Status: DISCONTINUED | OUTPATIENT
Start: 2025-04-22 | End: 2025-04-24

## 2025-04-22 RX ORDER — ONDANSETRON 2 MG/ML
4 INJECTION INTRAMUSCULAR; INTRAVENOUS EVERY 6 HOURS PRN
Status: DISCONTINUED | OUTPATIENT
Start: 2025-04-22 | End: 2025-05-04 | Stop reason: HOSPADM

## 2025-04-22 RX ORDER — CHLORHEXIDINE GLUCONATE ORAL RINSE 1.2 MG/ML
15 SOLUTION DENTAL EVERY 12 HOURS
Status: DISCONTINUED | OUTPATIENT
Start: 2025-04-22 | End: 2025-04-25

## 2025-04-22 RX ORDER — NALOXONE HYDROCHLORIDE 0.4 MG/ML
0.2 INJECTION, SOLUTION INTRAMUSCULAR; INTRAVENOUS; SUBCUTANEOUS
Status: DISCONTINUED | OUTPATIENT
Start: 2025-04-22 | End: 2025-05-04 | Stop reason: HOSPADM

## 2025-04-22 RX ORDER — HYDROMORPHONE HCL IN WATER/PF 6 MG/30 ML
0.2 PATIENT CONTROLLED ANALGESIA SYRINGE INTRAVENOUS
Status: DISCONTINUED | OUTPATIENT
Start: 2025-04-22 | End: 2025-04-22

## 2025-04-22 RX ORDER — OXYCODONE HYDROCHLORIDE 5 MG/1
10 TABLET ORAL EVERY 4 HOURS PRN
Status: DISCONTINUED | OUTPATIENT
Start: 2025-04-22 | End: 2025-05-04 | Stop reason: HOSPADM

## 2025-04-22 RX ORDER — NALOXONE HYDROCHLORIDE 0.4 MG/ML
0.1 INJECTION, SOLUTION INTRAMUSCULAR; INTRAVENOUS; SUBCUTANEOUS
Status: CANCELLED | OUTPATIENT
Start: 2025-04-22

## 2025-04-22 RX ORDER — HYDROMORPHONE HYDROCHLORIDE 1 MG/ML
.3-.5 INJECTION, SOLUTION INTRAMUSCULAR; INTRAVENOUS; SUBCUTANEOUS
Status: DISCONTINUED | OUTPATIENT
Start: 2025-04-22 | End: 2025-04-29

## 2025-04-22 RX ORDER — FENTANYL CITRATE 50 UG/ML
INJECTION, SOLUTION INTRAMUSCULAR; INTRAVENOUS PRN
Status: DISCONTINUED | OUTPATIENT
Start: 2025-04-22 | End: 2025-04-22

## 2025-04-22 RX ORDER — NALOXONE HYDROCHLORIDE 0.4 MG/ML
0.4 INJECTION, SOLUTION INTRAMUSCULAR; INTRAVENOUS; SUBCUTANEOUS
Status: DISCONTINUED | OUTPATIENT
Start: 2025-04-22 | End: 2025-05-04 | Stop reason: HOSPADM

## 2025-04-22 RX ORDER — GINSENG 100 MG
CAPSULE ORAL PRN
Status: DISCONTINUED | OUTPATIENT
Start: 2025-04-22 | End: 2025-04-22 | Stop reason: HOSPADM

## 2025-04-22 RX ORDER — ONDANSETRON 2 MG/ML
4 INJECTION INTRAMUSCULAR; INTRAVENOUS EVERY 30 MIN PRN
Status: CANCELLED | OUTPATIENT
Start: 2025-04-22

## 2025-04-22 RX ORDER — SODIUM CHLORIDE 9 MG/ML
INJECTION, SOLUTION INTRAVENOUS CONTINUOUS PRN
Status: DISCONTINUED | OUTPATIENT
Start: 2025-04-22 | End: 2025-04-22

## 2025-04-22 RX ORDER — PROPOFOL 10 MG/ML
5-75 INJECTION, EMULSION INTRAVENOUS CONTINUOUS
Status: DISCONTINUED | OUTPATIENT
Start: 2025-04-22 | End: 2025-04-25

## 2025-04-22 RX ORDER — BISACODYL 10 MG
10 SUPPOSITORY, RECTAL RECTAL DAILY PRN
Status: DISCONTINUED | OUTPATIENT
Start: 2025-04-25 | End: 2025-05-04 | Stop reason: HOSPADM

## 2025-04-22 RX ORDER — PROPOFOL 10 MG/ML
INJECTION, EMULSION INTRAVENOUS PRN
Status: DISCONTINUED | OUTPATIENT
Start: 2025-04-22 | End: 2025-04-22

## 2025-04-22 RX ORDER — LEVETIRACETAM 500 MG/1
500 TABLET ORAL ONCE
Status: COMPLETED | OUTPATIENT
Start: 2025-04-22 | End: 2025-04-22

## 2025-04-22 RX ORDER — LIDOCAINE 40 MG/G
CREAM TOPICAL
Status: DISCONTINUED | OUTPATIENT
Start: 2025-04-22 | End: 2025-05-04 | Stop reason: HOSPADM

## 2025-04-22 RX ORDER — SODIUM CHLORIDE, SODIUM LACTATE, POTASSIUM CHLORIDE, CALCIUM CHLORIDE 600; 310; 30; 20 MG/100ML; MG/100ML; MG/100ML; MG/100ML
INJECTION, SOLUTION INTRAVENOUS CONTINUOUS
Status: DISCONTINUED | OUTPATIENT
Start: 2025-04-22 | End: 2025-04-22 | Stop reason: HOSPADM

## 2025-04-22 RX ORDER — FAMOTIDINE 20 MG/1
20 TABLET, FILM COATED ORAL EVERY 24 HOURS
Status: DISCONTINUED | OUTPATIENT
Start: 2025-04-23 | End: 2025-04-25

## 2025-04-22 RX ORDER — DEXTROSE MONOHYDRATE 25 G/50ML
25-50 INJECTION, SOLUTION INTRAVENOUS
Status: DISCONTINUED | OUTPATIENT
Start: 2025-04-22 | End: 2025-04-23

## 2025-04-22 RX ORDER — HYDROMORPHONE HCL IN WATER/PF 6 MG/30 ML
0.4 PATIENT CONTROLLED ANALGESIA SYRINGE INTRAVENOUS
Status: DISCONTINUED | OUTPATIENT
Start: 2025-04-22 | End: 2025-04-22

## 2025-04-22 RX ORDER — AMOXICILLIN 250 MG
1 CAPSULE ORAL 2 TIMES DAILY
Status: DISCONTINUED | OUTPATIENT
Start: 2025-04-22 | End: 2025-05-04 | Stop reason: HOSPADM

## 2025-04-22 RX ORDER — PROCHLORPERAZINE MALEATE 5 MG/1
5 TABLET ORAL EVERY 6 HOURS PRN
Status: DISCONTINUED | OUTPATIENT
Start: 2025-04-22 | End: 2025-05-04 | Stop reason: HOSPADM

## 2025-04-22 RX ORDER — SODIUM CHLORIDE 9 MG/ML
INJECTION, SOLUTION INTRAVENOUS CONTINUOUS
Status: DISCONTINUED | OUTPATIENT
Start: 2025-04-22 | End: 2025-04-23

## 2025-04-22 RX ORDER — LIDOCAINE HYDROCHLORIDE 20 MG/ML
INJECTION, SOLUTION INFILTRATION; PERINEURAL PRN
Status: DISCONTINUED | OUTPATIENT
Start: 2025-04-22 | End: 2025-04-22

## 2025-04-22 RX ORDER — CEFAZOLIN SODIUM 1 G/3ML
1 INJECTION, POWDER, FOR SOLUTION INTRAMUSCULAR; INTRAVENOUS EVERY 8 HOURS
Status: DISCONTINUED | OUTPATIENT
Start: 2025-04-22 | End: 2025-04-24

## 2025-04-22 RX ORDER — LEVETIRACETAM 500 MG/1
500 TABLET ORAL 2 TIMES DAILY
Status: DISCONTINUED | OUTPATIENT
Start: 2025-04-22 | End: 2025-04-22 | Stop reason: DRUGHIGH

## 2025-04-22 RX ORDER — DEXAMETHASONE SODIUM PHOSPHATE 4 MG/ML
INJECTION, SOLUTION INTRA-ARTICULAR; INTRALESIONAL; INTRAMUSCULAR; INTRAVENOUS; SOFT TISSUE PRN
Status: DISCONTINUED | OUTPATIENT
Start: 2025-04-22 | End: 2025-04-22

## 2025-04-22 RX ORDER — FENTANYL CITRATE 0.05 MG/ML
50 INJECTION, SOLUTION INTRAMUSCULAR; INTRAVENOUS EVERY 5 MIN PRN
Status: CANCELLED | OUTPATIENT
Start: 2025-04-22

## 2025-04-22 RX ORDER — GLYCOPYRROLATE 0.2 MG/ML
INJECTION, SOLUTION INTRAMUSCULAR; INTRAVENOUS PRN
Status: DISCONTINUED | OUTPATIENT
Start: 2025-04-22 | End: 2025-04-22

## 2025-04-22 RX ORDER — NICOTINE POLACRILEX 4 MG
15-30 LOZENGE BUCCAL
Status: DISCONTINUED | OUTPATIENT
Start: 2025-04-22 | End: 2025-04-23

## 2025-04-22 RX ORDER — HYDROMORPHONE HCL IN WATER/PF 6 MG/30 ML
0.4 PATIENT CONTROLLED ANALGESIA SYRINGE INTRAVENOUS EVERY 5 MIN PRN
Status: CANCELLED | OUTPATIENT
Start: 2025-04-22

## 2025-04-22 RX ORDER — ONDANSETRON 4 MG/1
4 TABLET, ORALLY DISINTEGRATING ORAL EVERY 30 MIN PRN
Status: CANCELLED | OUTPATIENT
Start: 2025-04-22

## 2025-04-22 RX ORDER — POLYETHYLENE GLYCOL 3350 17 G/17G
17 POWDER, FOR SOLUTION ORAL DAILY
Status: DISCONTINUED | OUTPATIENT
Start: 2025-04-23 | End: 2025-05-04 | Stop reason: HOSPADM

## 2025-04-22 RX ORDER — DEXAMETHASONE 4 MG/1
8 TABLET ORAL 2 TIMES DAILY
Status: DISCONTINUED | OUTPATIENT
Start: 2025-04-22 | End: 2025-04-22 | Stop reason: ALTCHOICE

## 2025-04-22 RX ORDER — SODIUM CHLORIDE, SODIUM LACTATE, POTASSIUM CHLORIDE, CALCIUM CHLORIDE 600; 310; 30; 20 MG/100ML; MG/100ML; MG/100ML; MG/100ML
INJECTION, SOLUTION INTRAVENOUS CONTINUOUS
Status: CANCELLED | OUTPATIENT
Start: 2025-04-22

## 2025-04-22 RX ORDER — DEXAMETHASONE SODIUM PHOSPHATE 4 MG/ML
4 INJECTION, SOLUTION INTRA-ARTICULAR; INTRALESIONAL; INTRAMUSCULAR; INTRAVENOUS; SOFT TISSUE
Status: CANCELLED | OUTPATIENT
Start: 2025-04-22

## 2025-04-22 RX ORDER — HYDRALAZINE HYDROCHLORIDE 20 MG/ML
10 INJECTION INTRAMUSCULAR; INTRAVENOUS EVERY 30 MIN PRN
Status: DISCONTINUED | OUTPATIENT
Start: 2025-04-22 | End: 2025-05-04 | Stop reason: HOSPADM

## 2025-04-22 RX ORDER — LEVETIRACETAM 100 MG/ML
SOLUTION ORAL PRN
Status: DISCONTINUED | OUTPATIENT
Start: 2025-04-22 | End: 2025-04-22

## 2025-04-22 RX ORDER — ACETAMINOPHEN 325 MG/1
975 TABLET ORAL EVERY 8 HOURS
Status: COMPLETED | OUTPATIENT
Start: 2025-04-22 | End: 2025-04-25

## 2025-04-22 RX ORDER — OXYCODONE HYDROCHLORIDE 5 MG/1
5 TABLET ORAL EVERY 4 HOURS PRN
Status: DISCONTINUED | OUTPATIENT
Start: 2025-04-22 | End: 2025-05-04 | Stop reason: HOSPADM

## 2025-04-22 RX ADMIN — PHENTOLAMINE MESYLATE 5 MG: 5 INJECTION, POWDER, FOR SOLUTION INTRAMUSCULAR; INTRAVENOUS at 14:40

## 2025-04-22 RX ADMIN — CEFAZOLIN 1 G: 1 INJECTION, POWDER, FOR SOLUTION INTRAMUSCULAR; INTRAVENOUS at 20:01

## 2025-04-22 RX ADMIN — ROCURONIUM BROMIDE 50 MG: 50 INJECTION, SOLUTION INTRAVENOUS at 08:03

## 2025-04-22 RX ADMIN — LEVETIRACETAM 500 MG: 100 SOLUTION ORAL at 07:48

## 2025-04-22 RX ADMIN — PROPOFOL 150 MG: 10 INJECTION, EMULSION INTRAVENOUS at 07:37

## 2025-04-22 RX ADMIN — INSULIN ASPART 5 UNITS: 100 INJECTION, SOLUTION INTRAVENOUS; SUBCUTANEOUS at 16:31

## 2025-04-22 RX ADMIN — PROPOFOL 30 MG: 10 INJECTION, EMULSION INTRAVENOUS at 08:58

## 2025-04-22 RX ADMIN — LEVETIRACETAM 1000 MG: 10 INJECTION INTRAVENOUS at 19:55

## 2025-04-22 RX ADMIN — LIDOCAINE HYDROCHLORIDE 60 MG: 20 INJECTION, SOLUTION INFILTRATION; PERINEURAL at 07:37

## 2025-04-22 RX ADMIN — MIDAZOLAM 2 MG: 1 INJECTION INTRAMUSCULAR; INTRAVENOUS at 13:07

## 2025-04-22 RX ADMIN — FENTANYL CITRATE 50 MCG: 50 INJECTION INTRAMUSCULAR; INTRAVENOUS at 07:37

## 2025-04-22 RX ADMIN — PROPOFOL 20 MG: 10 INJECTION, EMULSION INTRAVENOUS at 08:38

## 2025-04-22 RX ADMIN — Medication 2 G: at 07:45

## 2025-04-22 RX ADMIN — GLYCOPYRROLATE 0.2 MG: 0.2 INJECTION, SOLUTION INTRAMUSCULAR; INTRAVENOUS at 08:01

## 2025-04-22 RX ADMIN — PHENYLEPHRINE HYDROCHLORIDE 0.5 MCG/KG/MIN: 10 INJECTION INTRAVENOUS at 07:45

## 2025-04-22 RX ADMIN — ONDANSETRON 4 MG: 2 INJECTION INTRAMUSCULAR; INTRAVENOUS at 11:51

## 2025-04-22 RX ADMIN — PROPOFOL 50 MG: 10 INJECTION, EMULSION INTRAVENOUS at 07:40

## 2025-04-22 RX ADMIN — CHLORHEXIDINE GLUCONATE 15 ML: 1.2 SOLUTION ORAL at 20:01

## 2025-04-22 RX ADMIN — PROPOFOL 30 MG: 10 INJECTION, EMULSION INTRAVENOUS at 07:54

## 2025-04-22 RX ADMIN — SODIUM CHLORIDE, SODIUM LACTATE, POTASSIUM CHLORIDE, AND CALCIUM CHLORIDE: .6; .31; .03; .02 INJECTION, SOLUTION INTRAVENOUS at 06:32

## 2025-04-22 RX ADMIN — PROPOFOL 20 MG: 10 INJECTION, EMULSION INTRAVENOUS at 08:23

## 2025-04-22 RX ADMIN — ROCURONIUM BROMIDE 30 MG: 50 INJECTION, SOLUTION INTRAVENOUS at 11:15

## 2025-04-22 RX ADMIN — INSULIN ASPART 2 UNITS: 100 INJECTION, SOLUTION INTRAVENOUS; SUBCUTANEOUS at 23:53

## 2025-04-22 RX ADMIN — PROPOFOL 30 MG: 10 INJECTION, EMULSION INTRAVENOUS at 08:25

## 2025-04-22 RX ADMIN — PROPOFOL 30 MG: 10 INJECTION, EMULSION INTRAVENOUS at 08:40

## 2025-04-22 RX ADMIN — NOREPINEPHRINE BITARTRATE 0.03 MCG/KG/MIN: 0.02 INJECTION, SOLUTION INTRAVENOUS at 13:31

## 2025-04-22 RX ADMIN — PROPOFOL 10 MCG/KG/MIN: 10 INJECTION, EMULSION INTRAVENOUS at 14:45

## 2025-04-22 RX ADMIN — DEXAMETHASONE SODIUM PHOSPHATE 10 MG: 4 INJECTION, SOLUTION INTRA-ARTICULAR; INTRALESIONAL; INTRAMUSCULAR; INTRAVENOUS; SOFT TISSUE at 07:48

## 2025-04-22 RX ADMIN — FENTANYL CITRATE 50 MCG: 50 INJECTION INTRAMUSCULAR; INTRAVENOUS at 07:38

## 2025-04-22 RX ADMIN — Medication 2 G: at 11:44

## 2025-04-22 RX ADMIN — INSULIN ASPART 2 UNITS: 100 INJECTION, SOLUTION INTRAVENOUS; SUBCUTANEOUS at 20:02

## 2025-04-22 RX ADMIN — Medication 2.5 MG: at 08:49

## 2025-04-22 RX ADMIN — DEXAMETHASONE SODIUM PHOSPHATE 8 MG: 10 INJECTION, SOLUTION INTRAMUSCULAR; INTRAVENOUS at 20:00

## 2025-04-22 RX ADMIN — Medication 7.5 MG: at 08:11

## 2025-04-22 RX ADMIN — ROCURONIUM BROMIDE 50 MG: 50 INJECTION, SOLUTION INTRAVENOUS at 09:04

## 2025-04-22 RX ADMIN — SODIUM CHLORIDE, SODIUM LACTATE, POTASSIUM CHLORIDE, AND CALCIUM CHLORIDE: .6; .31; .03; .02 INJECTION, SOLUTION INTRAVENOUS at 10:27

## 2025-04-22 RX ADMIN — LEVETIRACETAM 500 MG: 500 TABLET, FILM COATED ORAL at 07:11

## 2025-04-22 RX ADMIN — HYDROMORPHONE HYDROCHLORIDE 0.5 MG: 1 INJECTION, SOLUTION INTRAMUSCULAR; INTRAVENOUS; SUBCUTANEOUS at 11:42

## 2025-04-22 RX ADMIN — ROCURONIUM BROMIDE 50 MG: 50 INJECTION, SOLUTION INTRAVENOUS at 07:38

## 2025-04-22 RX ADMIN — Medication 200 MG: at 12:43

## 2025-04-22 RX ADMIN — ROCURONIUM BROMIDE 20 MG: 50 INJECTION, SOLUTION INTRAVENOUS at 10:45

## 2025-04-22 RX ADMIN — HYDROMORPHONE HYDROCHLORIDE 0.5 MG: 1 INJECTION, SOLUTION INTRAMUSCULAR; INTRAVENOUS; SUBCUTANEOUS at 21:48

## 2025-04-22 RX ADMIN — SODIUM CHLORIDE: 900 INJECTION INTRAVENOUS at 07:50

## 2025-04-22 RX ADMIN — ROCURONIUM BROMIDE 20 MG: 50 INJECTION, SOLUTION INTRAVENOUS at 10:04

## 2025-04-22 RX ADMIN — PHENYLEPHRINE HYDROCHLORIDE 100 MCG: 10 INJECTION INTRAVENOUS at 12:06

## 2025-04-22 ASSESSMENT — ACTIVITIES OF DAILY LIVING (ADL)
ADLS_ACUITY_SCORE: 38
ADLS_ACUITY_SCORE: 53
ADLS_ACUITY_SCORE: 51
ADLS_ACUITY_SCORE: 51
ADLS_ACUITY_SCORE: 55
ADLS_ACUITY_SCORE: 38
ADLS_ACUITY_SCORE: 51
ADLS_ACUITY_SCORE: 38
ADLS_ACUITY_SCORE: 55
ADLS_ACUITY_SCORE: 38
ADLS_ACUITY_SCORE: 55
ADLS_ACUITY_SCORE: 57
ADLS_ACUITY_SCORE: 53
ADLS_ACUITY_SCORE: 38

## 2025-04-22 ASSESSMENT — ENCOUNTER SYMPTOMS
SEIZURES: 0
ORTHOPNEA: 0

## 2025-04-22 NOTE — PROGRESS NOTES
RT responded to Code Blue in PACU. Upon arrival pt already had ETT in place, secured by tape. Pt was not receiving compressions, pt was being manually ventilated with ambu by CRNA. Pt placed on transport ventilator and ETT secured with tube dong. Pt transferred to ICU without issues.     Zunilda Garcia, RT  April 22, 2025

## 2025-04-22 NOTE — PROGRESS NOTES
I updated patient's son regarding hypotensive episode following extubation in the PACU to the point that he required activation of CODE BLUE.  Patient also required CPR.  Patient's pupil remained equal and sluggishly reacting on both sides.  We will do CT head once patient is stable.

## 2025-04-22 NOTE — CONSULTS
Hospitalist consult received, however patient is on mechanical ventilator in ICU and managed by intensivist. Hospitalist service will sign off at this time. Please reconsult once patient is out of ICU.

## 2025-04-22 NOTE — ANESTHESIA PROCEDURE NOTES
Airway       Patient location during procedure: OR       Procedure Start/Stop Times: 4/22/2025 7:41 AM  Staff -        CRNA: Tigist Cox APRN CRNA       Performed By: CRNA  Consent for Airway        Urgency: elective  Indications and Patient Condition       Indications for airway management: mikey-procedural       Induction type:intravenous       Mask difficulty assessment: 2 - vent by mask + OA or adjuvant +/- NMBA    Final Airway Details       Final airway type: endotracheal airway       Successful airway: ETT - single  Endotracheal Airway Details        ETT size (mm): 8.0       Cuffed: yes       Successful intubation technique: video laryngoscopy       VL Blade Size: Oneil 4       Grade View of Cords: 1       Adjucts: stylet       Position: Right       Measured from: gums/teeth       Secured at (cm): 22       Bite block used: None    Post intubation assessment        Placement verified by: capnometry, equal breath sounds and chest rise        Number of attempts at approach: 1       Number of other approaches attempted: 0       Secured with: tape       Ease of procedure: easy       Dentition: Intact and Unchanged    Medication(s) Administered   Medication Administration Time: 4/22/2025 7:41 AM

## 2025-04-22 NOTE — PROGRESS NOTES
SPIRITUAL HEALTH SERVICES  SPIRITUAL ASSESSMENT Progress Note  [facility] OR PACU     REFERRAL SOURCE: Code Blue    Responded to code blue. No family present at this time.    PLAN: Spiritual Health remains available for support. Please consult as needs arise or as requested.    Jany Broussard  Associate      [facility] available 24/7 for emergent requests/referrals, either by paging the on-call  or by entering an ASAP/STAT consult in Epic (this will also page the on-call ).

## 2025-04-22 NOTE — BRIEF OP NOTE
"[facility]    Brief Operative Note    Pre-operative diagnosis: Brain mass [G93.89]  Post-operative diagnosis Same as pre-operative diagnosis    Procedure: RIGHT FRONTO-TEMPORAL CRANIOTOMY, USING OPTICAL TRACKING SYSTEM, WITH NEOPLASM EXCISION, Right - Head    Surgeon: Surgeons and Role:     * Davie Erickson MD - Primary     * Nelly Coon NP - Assisting  Anesthesia: General   Estimated Blood Loss: 100 mL    Drains: Darryn-Chen  Specimens:   ID Type Source Tests Collected by Time Destination   1 : Right temporal tumor Tissue Brain SURGICAL PATHOLOGY EXAM Davie Erickson MD 4/22/2025 10:07 AM    2 : Right Temporal Tumor Tissue Brain SURGICAL PATHOLOGY EXAM Davie Erickson MD 4/22/2025 12:03 PM      Findings:   Tumor was necrotic with well-defined capsule.  Gross total resection achieved .  Complications: None.  Implants:   Implant Name Type Inv. Item Serial No.  Lot No. LRB No. Used Action   GRAFT DURAGEN DURAL MATRIX 4X5CM ID-[id number] - [id number] Other GRAFT DURAGEN DURAL MATRIX 4X5CM ID-[id number]  [employer] [id number] Right 1 Implanted   GRAFT DURAGEN 3X3\"  - [id number] Other GRAFT DURAGEN 3X3\"   [employer] [id number] Right 1 Implanted   IMP STEVO HOLE COVER UNIV NEURO 3 OD10 MM CRANIAL LOW PROF - [id number] Metallic Hardware/[place] IMP STEVO HOLE COVER UNIV NEURO 3 OD10 MM CRANIAL LOW PROF  [employer] LOAD 41 06 [id number] Right 2 Implanted   MESH DYNMC [id number] THK.3MM MED CRNMXF 2D [facility] [id number] - [id number] Metallic Hardware/[place] MESH DYNMC [id number] THK.3MM MED CRNMXF 2D BL [facility] [id number]  [employer] LOAD 41 06 [id number] Right 1 Implanted   SCREW BN 3MM 1.5MM SLF DRL AX STAB NS UNV NEURO 3 [id number] - [id number] Metallic Hardware/[place] SCREW BN 3MM 1.5MM SLF DRL AX STAB NS UNV NEURO 3 [id number]  [employer] LOAD 41 06 [id number] Right 1 Implanted   SCREW BONE NEURO 3 AXIS 4X1.5MM [id number] - [id number] Metallic " Hardware/[place] SCREW BONE NEURO 3 AXIS 4X1.5MM [id number]  [employer] LOAD 41 06 [id number] Right 15 Implanted

## 2025-04-22 NOTE — ANESTHESIA POSTPROCEDURE EVALUATION
Patient: Pascual Perea    Procedure: Procedure(s):  RIGHT FRONTO-TEMPORAL CRANIOTOMY, USING OPTICAL TRACKING SYSTEM, WITH NEOPLASM EXCISION       Anesthesia Type:  General    Note:  Disposition: ICU            ICU Sign Out: Anesthesiologist/ICU physician sign out WAS performed   Postop Pain Control: Uneventful            Sign Out: Well controlled pain   PONV: No   Neuro/Psych:             Events: Seizure   Airway/Respiratory:             Sign Out: AIRWAY IN SITU/Resp. Support               Airway in situ/Resp. Support: ETT                 Reason: Unplanned   CV/Hemodynamics:             Events: Cardiac arrest   Other NRE:    DID A NON-ROUTINE EVENT OCCUR? YES    Event details/Postop Comments:  84 year old male s/p right frontotemporal craniotomy for intracranial mass.  On arrival to PACU patient started seizing (grand mal). Gross motor movements stopped just before two mg of midazolam was given.  Post seizure their was a significant drop in blood pressure by arterial line followed by progressive bradycardia.   This was initially  treated with boluses of phenylephrine and levophed. Patient was reintubated without  medications.  BS equal bilaterally with positive end tidal CO2.  Patient hypotensive and bradycardiac despite boluses of vasoactive meds.  CPR started, 0.5 mg epinephrine given with CPR in progress. After second dose of 0.5 mg epinephrine there was return of spontaneous circulation.  BP significantly elevated treated with small doses of propofol( total 40 mg, given in 2 20 mcg   boluses) and 40 mcg NTG( given in two 20 mcg boluses).  Post return of circulation patient with rapid heart rate to the 160's. Pads were placed on patient's chest.  ? V tach vs A fib with RVR. HR slowly decreased top the 70's. Blood pressure slowly decreased into the upper 70's and required intermittent bolus of levophed to maintain systolic pressure > 100.  A levophed infusion was started at 0.5 mcg/ kg/ min.  LABs were sent:  Hgb - 12, , pH 7.02, base defic -18, PO2 > 400.  Patient was given one amp of bicarb.  Patient was transferred to ICU for additional care and management.            Last vitals:  Vitals Value Taken Time   /55 04/22/25 1336   Temp     Pulse 80 04/22/25 1340   Resp 20 04/22/25 1340   SpO2 100 % 04/22/25 1340       Electronically Signed By: Sidney Mackey MD  April 22, 2025  5:02 PM

## 2025-04-22 NOTE — CODE/RAPID RESPONSE
Brief House KOJO Note    House team responded to a code blue page at 13:16 pm in main PACU. Code blue ran by attending anesthesiologist Dr. Mackey. Minimally assisted in the facilitation of resuscitation efforts.     Lenore Guthrie NP  [facility]  Securely message with the Vocera Web Console (learn more here)  Text page via [employer] Paging/Directory

## 2025-04-22 NOTE — ANESTHESIA PROCEDURE NOTES
Arterial Line Procedure Note    Pre-Procedure   Staff -        Anesthesiologist:  Sidney Mackey MD       Performed By: Anesthesiologist       Location: pre-op       Pre-Anesthestic Checklist: patient identified, IV checked, site marked, risks and benefits discussed, informed consent, monitors and equipment checked and pre-op evaluation  Timeout:       Correct Patient: Yes        Correct Procedure: Yes        Correct Site: Yes        Correct Position: Yes   Line Placement:   This line was placed Pre Induction starting at 4/22/2025 6:46 AM and ending at 4/22/2025 7:02 AM  Procedure   Procedure: arterial line and new line       Laterality: right       Insertion Site: radial (radial).  Sterile Prep        Skin prep: Chloraprep  Insertion/Injection        Technique: ultrasound guided and Seldinger Technique        1. Ultrasound was used to evaluate the access site.       2. Artery evaluated via ultrasound for patency/adequacy.       3. Using real-time ultrasound the needle/catheter was observed entering the artery/vein.       4. Permanent image was captured and entered into the patient's record.       5. The visualized structures were anatomically normal.       6. There were no apparent abnormal pathologic findings.       Catheter Type/Size: 20 gauge, 1.75 in/4.5 cm quick cath (integral wire)  Narrative         Secured by: suture       Tegaderm dressing used.       Complications: None apparent,        Arterial waveform: Yes        IBP within 10% of NIBP: Yes   Comments:  Ultrasound Interpretation arterial catheter    1. Ultrasound guidance was used to evaluate potential access sites.  2. Ultrasound was also used to verify the patency of the vessel specified above.   3. Ultrasound was used to visualize the needle entering the vessel.   4. The visualized structures were anatomically normal.  5. There were no apparent abnormal pathological findings.  6. A permanent ultrasound image was saved in the patient's  record.    Some calcification in right radial artery    Patient tolerated procedure well   No complications

## 2025-04-22 NOTE — OP NOTE
Operative note   Date of surgery: 04/22/25      Surgeon: Davie Erickson MD    Assistant:  Nelly Coon NP - Assisting   (Note: The assistant was present for and assisted with the entire surgery, and his/her role as an assistant was crucial for aid in positioning, exposure, suctioning, retraction, and closure).  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    Cerebral edema with mass effect.       Procedure:  1.  Right frontoparietal craniotomy for resection of right temporal brain tumor.  2. Use of Speedyboyalth intra-operative neuro-navigation with MRI guidance   3. Use of intraoperative microscope.      EBL: 100 mL      Indications:   Mr. 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 I explained the MRI brain with and without contrast findings to the patient and his son and showed them the images.  I explained to them the presence of right temporal solid- cystic lesion with vasogenic oedema.  I discussed the differential diagnosis including metastatic brain tumor, primary brain tumor including glioma, meningioma and inflammatory lesions.I discussed the management options including watchful observation, stereotactic needle biopsy and craniotomy with maximal safe resection of the tumor.  I discussed the risk and benefits of the management options.Given the size of the tumor with mass effect,  I would recommend craniotomy with resection of the right temporal solid cystic tumor.  I discussed  the risks and benefits of the surgery including but not limited to bleeding, infection, stroke, need for repeat surgery, need for adjuvant treatment, incomplete resection of the tumor, DVT/PE, MI and others.  After extensive discussion of the risk and benefits of surgery patient and his family agreed to proceed.      Description of surgery:   The patient was brought to the OR and general anesthesia was induced.  Duran catheter was placed and all the pressure points were padded well.  The patient was positioned supine with the head slightly turned to the left using a shoulder bump  in Landaverde pins.  [employer] Stealth navigation was registered with MRI guidance.  Sterile prepping and draping procedures were performed.  Antibiotics were administered and timeout was performed.      The # 10 blade was used to perform a curvilinear incision in a reverse question july fashion starting in front of the tragus up to the midline.  The tumor was mapped on the skull.  Myocutaneous Flap was retracted using fishhooks.   Tumor was mapped out using neuronavigation and a right frontoparietal craniotomy was performed using 4 shelia holes which were created using no 6 cutting shelia and craniotome was used to complete the craniotomy.  The dura was densely adhered to the bone and there were multiple dural tears with the craniotomy.  Subtemporal decompression was achieved using Leksell rongeurs and high-speed drill till the middle fossa floor was flushed with the craniotomy flap.  Anteriorly the exposure was done till the temporal pole was exposed.  Sphenoid ridge was drilled using high-speed drill.     The dura was then opened using Enrique and Metzenbaum scissors.  The dura was retracted using 4-0 Nurolon.   The lesion's  location confirmed with navigation.  There was discoloration of middle and inferior temporal gyrus.  The shortest distance to the tumor was identified and corticectomy was performed over the middle and inferior temporal  gyrus.  The tumor was grayish in color with thick capsule.  The capsule was opened using bipolar cautery and microscissors.  Part of the tumor was sent for frozen section which came back as likely metastatic tumor.  Further corticectomy of the middle and inferior temporal gyrus was performed till the temporal pole was reached.  The anterior pole of the tumor was exposed which was densely adhered to the anterior temporal pole and middle fossa floor.  Tumor capsule was then opened using bipolar cautery and microscissors and internal debulking was performed using suction and bipolar cautery.  Following satisfactory internal decompression, the attachment of the tumor to the middle fossa floor was dissected using bipolar cautery and the attachment site was coagulated.  Following multiple rounds of internal decompression and dissection, the anterior pole of the tumor was disconnected. The dissection was continued all around the tumor capsule  it from the normal white matter using microdissection techniques.  Anteriorly, the tumor was resected to the temporal pole.  Inferiorly the tumor was resected up to the middle fossa floor till the tentorium was reached.  Medially the tumor was meticulously dissected from the sylvian fissure.  Posterior superiorly the tumor was dissected from the normal surrounding white matter.  The tumor was grayish, suckable, friable with necrosis and moderate vascularity. The tumor capsule was maintained all around to avoid capsule breach and dissemination of tumor.  The gross total resection of the tumor was confirmed using Stealth navigation.     Following satisfactory gross total resection of the mass using neuro navigation, hemostasis was achieved using Surgicel, Floseal and thrombin-soaked Gelfoam.  The brain was completely relaxed at the end of the procedure.  The resection cavity was irrigated with warm saline and filled.  Dura was that closed using 4-0 Nurolon and covered with  DuraGen in an underlay and overlay manner.   The bone flap was fixed back into place with the cranial plating system.  Antibiotic irrigation was performed, the galea was closed with 2-0 Vicryls, and the skin was closed with 3-0 monocryl.    #10 channel drain was placed in the subgaleal space.  Dressing was  applied using Xeroform, Telfa and medpore tape.  Counts were correct x 2.    Patient withstood the procedure well and transferred to ICU following extubation in a stable condition.There were no intraprocedural complications.  I updated patient's son after the surgery.    Davie Erickson MD

## 2025-04-22 NOTE — ANESTHESIA CARE TRANSFER NOTE
Patient: Pascual Perea    Procedure: Procedure(s):  RIGHT FRONTO-TEMPORAL CRANIOTOMY, USING OPTICAL TRACKING SYSTEM, WITH NEOPLASM EXCISION       Diagnosis: Brain mass [G93.89]  Diagnosis Additional Information: No value filed.    Anesthesia Type:   General     Note:    Oropharynx: endotracheal tube in place and ventilatory support  Level of Consciousness: iatrogenic sedation  Patient oxygen source: mechanical ventilation.    Independent Airway: airway patency not satisfactory and stable  Dentition: dentition unchanged  Vital Signs Stable: post-procedure vital signs reviewed and stable  Report to RN Given: handoff report given  Patient transferred to: ICU  Comments: Patient connected to SpO2, EKG, and arterial blood pressure transport monitors and accompanied by MDA, CRNA, house NP, ICU RN and resp therapist to ICU room. Patient ventilated by mechanical ventilation during transport.     On arrival to ICU, endotracheal tube position unchanged, equal, bilateral breath sounds auscultated in ICU room, patient placed on ICU ventilator by respiratory therapist, teeth and oral mucosa intact and unchanged at handoff of care. At anesthesia handoff of care, clinical monitors and alarms on and functioning, report on patient's clinical status given to ICU RN, report on patient's clinical status given to intensivist, ICU staff questions answered  ICU Handoff: Call for PAUSE to initiate/utilize ICU HANDOFF, Identified Patient, Identified Responsible Provider, Reviewed the Pertinent Medical History, Discussed Surgical Course, Reviewed Intra-OP Anesthesia Management and Issues during Anesthesia, Set Expectations for Post Procedure Period and Allowed Opportunity for Questions and Acknowledgement of Understanding        Electronically Signed By: JAKI Pineda CRNA  April 22, 2025  2:10 PM

## 2025-04-22 NOTE — H&P
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 He had a cardiopulmonary arrest in PACU requiring re-intubation and CPR with ROSC. He also had a seizure in PACU. The patient was admitted to the ICU for medical management.    Neuro:  Right temporal brain tumor s/p craniotomy  History of neuroendocrine tumor s/p chemoradiation  Post-operative seizure  - Propofol for sedation  - Multimodal pain control  - S/p Keppra and benzodiazepines for seizure in PACU  - CT Head STAT now that he is hemodynamically stable  - Neurosurgery following    CV:  Cardiopulmonary arrest  History of atrial fibrillation on chronic anticoagulation  History of CAD s/p CABG  Hyperlipidemia  - MAP goal > 65, currently requiring intermittent levophed  - Holding anticoagulation, PTA statin    Pulm:  Respiratory arrest  - Wean PEEP/FiO2, as able  - Maintain intubation until neurological status is determined    GI:  No active issues  - RD to manage TF if remains intubated    Renal:  Chronic kidney disease (baseline creatinine 1.1-1.3)  - Monitor UOP, Cr, I/O  - Avoid nephrotoxic agents    ID:  No active issues     Heme:  Acute blood loss anemia  - Daily CBC ordered  - Transfuse hemoglobin <7    Endo:  Type 2 diabetes mellitus (A1c 6.9%)  Hypothyroidism  - Insulin sliding scale  - Goal glucose <180  - PTA levothyroxine to be restarted once enteral access is achieved    PPx:  1. DVT: mechanical only   2. VAP: HOB 30 degrees, chlorhexidine rinse  3. Stress Ulcer: PPI  4. Restraints: Nonviolent soft two point restraints required and necessary for patient  "safety and continued cares and good effect as patient continues to pull at necessary lines, tubes despite education and distraction. Will readdress daily.   5. Wound care - per unit routine   6. Feeding - NPO  7. Family to be updated via phone    Clinically Significant Risk Factors Present on Admission                   # Hypertension: Noted on problem list          # DMII: A1C = 6.9 % (Ref range: 0.0 - 5.6 %) within past 6 months   # Overweight: Estimated body mass index is 25.76 kg/m  as calculated from the following:    Height as of this encounter: 1.803 m (5' 11\").    Weight as of this encounter: 83.8 kg (184 lb 11.2 oz).       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 He was readmitted for scheduled craniotomy on 4/22/2025. Post-operatively, he was extubated and transferred to PACU. However, he began to have a seizure, received benzos (Keppra pre- and intra-operatively) and then had bradycardia and respiratory arrest. Code blue was called and CPR was initiated with one dose of epinephrine pushed. No shocks were required, although his heart rate was in the 160's after the epinephrine, which was thought to be atrial fibrillation.    On arrival to the ICU, he was receiving levophed, although his peripheral IV infiltrated. He was given a push of vasopressin for hypotension. Amiodarone was hung, but not initiated. He was intubated and remained sedated/paralyzed. He was acidotic, but with normal PaO2 and CO2, due to elevated lactate of 17.    Unable to obtain " "ROS due to intubation.    BP (!) 149/72 (Cuff Size: Adult Regular)   Pulse 59   Temp 97  F (36.1  C) (Temporal)   Resp 18   Ht 1.803 m (5' 11\")   Wt 83.8 kg (184 lb 11.2 oz)   SpO2 97%   BMI 25.76 kg/m      Resp: 18      Intake/Output Summary (Last 24 hours) at 4/22/2025 1341  Last data filed at 4/22/2025 1236  Gross per 24 hour   Intake 1700 ml   Output 900 ml   Net 800 ml       Physical Exam:  Constitutional: healthy, alert, and no distress  Head: Clean, dry, intact dressing to craniotomy with SUSI drain with serosang output  Neck: Neck supple. No adenopathy.  ENT: No neck nodes or sinus tenderness  Cardiovascular: RRR. No murmurs, clicks gallops or rub  Respiratory: Good diaphragmatic excursion. Lungs clear  Gastrointestinal: Abdomen soft, non-tender. BS normal. No masses, organomegaly  : Deferred  Musculoskeletal: No gross deformities noted, gait normal, and normal muscle tone  Skin: no suspicious lesions or rashes  Neurologic: Sedated, but opens eyes, does not follow commands  Psychiatric: Unable to assess    Labs: reviewed. Significant for lactic acidosis, anemia, leukocytosis.    Imaging: reviewed. Significant for ETT in place, about 2 cm above the yusuf.    Past Medical History  Past Medical History:   Diagnosis Date    Adhesive capsulitis of shoulder     Allergic rhinitis 01/07/2008    Problem list name updated by automated process. Provider to review      Anemia 03/10/2014    Anemia, unspecified     Antiplatelet or antithrombotic long-term use     Arrhythmia     Atrial fib/flutter    Atrial fibrillation, unspecified type (H) 05/02/2018    CAD (coronary artery disease)     Chemotherapy-induced neutropenia 07/19/2024    Coronary artery disease involving native coronary artery of native heart without angina pectoris 02/16/2025    Coronary atherosclerosis     Nuc Stress 4/15/16: On both stress and rest images there is a very small size mild intensity apical photopenic defect which is of equal extent " and intensity on both stress and rest images. Gated images demonstrated no regional wall motion abnormalities. The left ventricular ejection fraction was calculated to be 61% with stress.      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    History of cardioversion 04/24/2018    a single synchronized shock of 120 joules restored normal sinus rhythm    History of cardioversion 02/13/2019    single shock , 200 joules successul in restoring NSR    History of colonic polyps 03/24/2017    Hyperlipidemia LDL goal <70 05/26/2011    Hypertension     Hypothyroidism     Hypothyroidism, unspecified type 08/15/2016    Impotence of organic origin     Laceration of finger 06/2010     left thumb s/p sutures    Numbness and tingling     diabetic neuropathy bilateral feet    BRANDON (obstructive sleep apnea)     intolerant of CPAP, uses it occasionally.  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 disease, with long-term current use of insulin (H) 02/16/2025    Type 2 diabetes mellitus without complication  (goal A1C<8) 10/24/2015    Typical atrial flutter (H) 04/18/2016    Unspecified hypothyroidism     Vitamin D deficiency 09/03/2011      Surgical History   Past Surgical History:   Procedure Laterality Date    ANESTHESIA CARDIOVERSION N/A 02/13/2019    Procedure: ANESTHESIA CARDIOVERSION;  Surgeon: GENERIC ANESTHESIA PROVIDER;  Location:  OR    ANESTHESIA CARDIOVERSION N/A  05/22/2019    Procedure: ANESTHESIA, FOR CARDIOVERSION;  Surgeon: GENERIC ANESTHESIA PROVIDER;  Location:  OR    CARDIAC SURGERY      bypass in 1997    CARDIOVERSION  04/24/2018    COLONOSCOPY      CORONARY ANGIOGRAPHY ADULT ORDER      4/2/2012    CORONARY ARTERY BYPASS  1997    [facility]    ENDOSCOPIC ULTRASOUND UPPER GASTROINTESTINAL TRACT (GI) N/A 03/14/2019    Procedure: ENDOSCOPIC ULTRASOUND OF UPPER GASTROINTESTINAL TRACT;  Surgeon: Ju Torres MD;  Location:  OR    ESOPHAGOSCOPY, GASTROSCOPY, DUODENOSCOPY (EGD), COMBINED N/A 4/15/2022    Procedure: ESOPHAGOGASTRODUODENOSCOPY (EGD);  Surgeon: Erik Baca DO;  Location:  GI    EXCISE MASS HEAD Left 08/18/2016    Procedure: EXCISE MASS HEAD;  Surgeon: Ivan Hernandez MD;  Location: [facility]    HEART CATH, ANGIOPLASTY  04/2012    IR CHEST PORT PLACEMENT > 5 YRS OF AGE  8/9/2024    IR PORT REMOVAL RIGHT  2/17/2025    LAPAROSCOPIC CHOLECYSTECTOMY N/A 03/17/2019    Procedure: LAPAROSCOPIC CHOLECYSTECTOMY;  Surgeon: Janel Paz MD;  Location:  OR    PHACOEMULSIFICATION CLEAR CORNEA WITH STANDARD INTRAOCULAR LENS IMPLANT Left 06/08/2015    Procedure: PHACOEMULSIFICATION CLEAR CORNEA WITH STANDARD INTRAOCULAR LENS IMPLANT;  Surgeon: Nixon Farnsworth MD;  Location:  EC    PHACOEMULSIFICATION CLEAR CORNEA WITH STANDARD INTRAOCULAR LENS IMPLANT Right 06/22/2015    Procedure: PHACOEMULSIFICATION CLEAR CORNEA WITH STANDARD INTRAOCULAR LENS IMPLANT;  Surgeon: Nixon Farnsworth MD;  Location:  [facility]    SEPTOPLASTY, TURBINOPLASTY, COMBINED Bilateral 08/18/2016    Procedure: COMBINED SEPTOPLASTY, TURBINOPLASTY;  Surgeon: Ivan Hernandez MD;  Location: [facility]    ZZC NONSPECIFIC PROCEDURE  1997    CABG ([facility])    [facility] NONSPECIFIC PROCEDURE  08/2001    stress echo      Family History   I have reviewed this patient's family history and updated it with pertinent information if needed.  Family History    Problem Relation Age of Onset    Genitourinary Problems Father         d: age 89; ARF    Hypertension Father     Diabetes Mother         d: age 68, CAD    Heart Disease Mother         MI    Myocardial Infarction Mother     Cardiovascular Brother         d:  age 31; CAD    Heart Disease Brother         age 31, MI    Diabetes Sister         b:  1939; DM2    Diabetes Sister        Social History   Social History     Tobacco Use    Smoking status: Former     Current packs/day: 0.00     Average packs/day: 1 pack/day for 6.5 years (6.5 ttl pk-yrs)     Types: Cigarettes, Cigars, Pipe     Start date:      Quit date: 1964     Years since quittin.8    Smokeless tobacco: Never    Tobacco comments:     10/16/24 Pt states he will an occasional cigar.    Vaping Use    Vaping status: Never Used   Substance Use Topics    Alcohol use: Not Currently     Comment: couple drinks a year    Drug use: No       Discussed with Dr. Anne.    Leydi Meyers MD  Surgical Critical Care Fellow

## 2025-04-22 NOTE — OR NURSING
Pt arrived to PACU 1306. At 1308, during handoff report, pt displayed seizure like activity, MDA at bedside. CRNA, OR nurse remained at bedside. Code blue called, code team at bedside, chest compressions initiated and pt intubated 1315. Dr Erickson at bedside. Report called to ICU nurse. Pts family updated by Dr. Erickson.

## 2025-04-22 NOTE — PROGRESS NOTES
[facility] ICU RESPIRATORY NOTE        Date of Admission: 4/22/2025    Date of Intubation (most recent): 4/22/2025    Reason for Mechanical Ventilation: Cardiopulmonary arrest    Number of Days on Mechanical Ventilation: 1    Met Criteria for Spontaneous Breathing Trial: No    Bite Block: No    Significant Events Today: s/p CPR, First day on ICU ventilator    ABG Results:   Recent Labs   Lab 04/22/25  1449 04/22/25  1349 04/22/25  1332   PH 7.38  --  7.03*   PCO2 35  --  46*   PO2 154*  --  486*   HCO3 21  --  12*   O2PER 30 0  --          Current Vent Settings: FiO2 (%): 30 %, Resp: 20, Vent Mode: VC/AC, Resp Rate (Set): 16 breaths/min, Tidal Volume (Set, mL): 500 mL, PEEP (cm H2O): 5 cmH2O, Resp Rate (Set): 16 breaths/min, Tidal Volume (Set, mL): 500 mL, PEEP (cm H2O): 5 cmH2O    Skin Assessment: Intact    Plan: RT to continue to monitor, wean as tolerated    Beatriz Crump, RT on 4/22/2025 at 6:18 PM

## 2025-04-22 NOTE — CODE/RAPID RESPONSE
Patient extubated in OR after case and brought to PACU.  On arrival, patient had seizure.  Dr. Mackey notified and at bedside along with this CRNA.  2mg versed give.  After seizure, pt's suffered cardiovascular collapse.  Code Blue called.  Patient reintubated in PACU by Lei Pantoja CRNA.  Once vitals reestablised, patient was transferred to ICU on mechanical ventilator.  Report given to intensivist and RN caring for patient.  Questions answered.

## 2025-04-23 ENCOUNTER — APPOINTMENT (OUTPATIENT)
Dept: MRI IMAGING | Facility: CLINIC | Age: 85
DRG: 025 | End: 2025-04-23
Attending: NURSE PRACTITIONER
Payer: COMMERCIAL

## 2025-04-23 ENCOUNTER — HOSPITAL ENCOUNTER (INPATIENT)
Dept: NEUROLOGY | Facility: CLINIC | Age: 85
Discharge: HOME | DRG: 025 | End: 2025-04-23
Attending: STUDENT IN AN ORGANIZED HEALTH CARE EDUCATION/TRAINING PROGRAM | Admitting: NEUROLOGICAL SURGERY
Payer: COMMERCIAL

## 2025-04-23 ENCOUNTER — APPOINTMENT (OUTPATIENT)
Dept: CT IMAGING | Facility: CLINIC | Age: 85
DRG: 025 | End: 2025-04-23
Payer: COMMERCIAL

## 2025-04-23 ENCOUNTER — APPOINTMENT (OUTPATIENT)
Dept: GENERAL RADIOLOGY | Facility: CLINIC | Age: 85
DRG: 025 | End: 2025-04-23
Attending: NEUROLOGICAL SURGERY
Payer: COMMERCIAL

## 2025-04-23 LAB
ALBUMIN SERPL BCG-MCNC: 3.3 G/DL (ref 3.5–5.2)
ALBUMIN UR-MCNC: NEGATIVE MG/DL
ALP SERPL-CCNC: 51 U/L (ref 40–150)
ALT SERPL W P-5'-P-CCNC: 17 U/L (ref 0–70)
ANION GAP SERPL CALCULATED.3IONS-SCNC: 11 MMOL/L (ref 7–15)
ANION GAP SERPL CALCULATED.3IONS-SCNC: 12 MMOL/L (ref 7–15)
APPEARANCE UR: CLEAR
AST SERPL W P-5'-P-CCNC: 17 U/L (ref 0–45)
ATRIAL RATE - MUSE: 63 BPM
BILIRUB SERPL-MCNC: 0.7 MG/DL
BILIRUB UR QL STRIP: NEGATIVE
BUN SERPL-MCNC: 27.9 MG/DL (ref 8–23)
BUN SERPL-MCNC: 28.3 MG/DL (ref 8–23)
CALCIUM SERPL-MCNC: 8.6 MG/DL (ref 8.8–10.4)
CALCIUM SERPL-MCNC: 8.7 MG/DL (ref 8.8–10.4)
CHLORIDE SERPL-SCNC: 105 MMOL/L (ref 98–107)
CHLORIDE SERPL-SCNC: 108 MMOL/L (ref 98–107)
COLOR UR AUTO: ABNORMAL
CREAT SERPL-MCNC: 1.1 MG/DL (ref 0.67–1.17)
CREAT SERPL-MCNC: 1.12 MG/DL (ref 0.67–1.17)
DIASTOLIC BLOOD PRESSURE - MUSE: NORMAL MMHG
EGFRCR SERPLBLD CKD-EPI 2021: 65 ML/MIN/1.73M2
EGFRCR SERPLBLD CKD-EPI 2021: 67 ML/MIN/1.73M2
ERYTHROCYTE [DISTWIDTH] IN BLOOD BY AUTOMATED COUNT: 14.6 % (ref 10–15)
EST. AVERAGE GLUCOSE BLD GHB EST-MCNC: 180 MG/DL
GLUCOSE BLDC GLUCOMTR-MCNC: 127 MG/DL (ref 70–99)
GLUCOSE BLDC GLUCOMTR-MCNC: 133 MG/DL (ref 70–99)
GLUCOSE BLDC GLUCOMTR-MCNC: 134 MG/DL (ref 70–99)
GLUCOSE BLDC GLUCOMTR-MCNC: 158 MG/DL (ref 70–99)
GLUCOSE BLDC GLUCOMTR-MCNC: 176 MG/DL (ref 70–99)
GLUCOSE BLDC GLUCOMTR-MCNC: 186 MG/DL (ref 70–99)
GLUCOSE BLDC GLUCOMTR-MCNC: 186 MG/DL (ref 70–99)
GLUCOSE BLDC GLUCOMTR-MCNC: 198 MG/DL (ref 70–99)
GLUCOSE BLDC GLUCOMTR-MCNC: 207 MG/DL (ref 70–99)
GLUCOSE BLDC GLUCOMTR-MCNC: 260 MG/DL (ref 70–99)
GLUCOSE BLDC GLUCOMTR-MCNC: 263 MG/DL (ref 70–99)
GLUCOSE BLDC GLUCOMTR-MCNC: 286 MG/DL (ref 70–99)
GLUCOSE SERPL-MCNC: 203 MG/DL (ref 70–99)
GLUCOSE SERPL-MCNC: 274 MG/DL (ref 70–99)
GLUCOSE UR STRIP-MCNC: NEGATIVE MG/DL
HBA1C MFR BLD: 7.9 %
HCO3 SERPL-SCNC: 20 MMOL/L (ref 22–29)
HCO3 SERPL-SCNC: 21 MMOL/L (ref 22–29)
HCT VFR BLD AUTO: 30.6 % (ref 40–53)
HGB BLD-MCNC: 10.5 G/DL (ref 13.3–17.7)
HGB UR QL STRIP: ABNORMAL
INTERPRETATION ECG - MUSE: NORMAL
KETONES UR STRIP-MCNC: NEGATIVE MG/DL
LACTATE SERPL-SCNC: 1.4 MMOL/L (ref 0.7–2)
LACTATE SERPL-SCNC: 1.7 MMOL/L (ref 0.7–2)
LEUKOCYTE ESTERASE UR QL STRIP: ABNORMAL
MAGNESIUM SERPL-MCNC: 2 MG/DL (ref 1.7–2.3)
MAGNESIUM SERPL-MCNC: 2.1 MG/DL (ref 1.7–2.3)
MCH RBC QN AUTO: 30.7 PG (ref 26.5–33)
MCHC RBC AUTO-ENTMCNC: 34.3 G/DL (ref 31.5–36.5)
MCV RBC AUTO: 90 FL (ref 78–100)
MUCOUS THREADS #/AREA URNS LPF: PRESENT /LPF
NITRATE UR QL: NEGATIVE
P AXIS - MUSE: -81 DEGREES
PH UR STRIP: 6 [PH] (ref 5–7)
PHOSPHATE SERPL-MCNC: 3 MG/DL (ref 2.5–4.5)
PLATELET # BLD AUTO: 104 10E3/UL (ref 150–450)
POTASSIUM SERPL-SCNC: 3.7 MMOL/L (ref 3.4–5.3)
POTASSIUM SERPL-SCNC: 4 MMOL/L (ref 3.4–5.3)
PR INTERVAL - MUSE: 328 MS
PROT SERPL-MCNC: 5 G/DL (ref 6.4–8.3)
QRS DURATION - MUSE: 134 MS
QT - MUSE: 506 MS
QTC - MUSE: 517 MS
R AXIS - MUSE: 110 DEGREES
RBC # BLD AUTO: 3.42 10E6/UL (ref 4.4–5.9)
RBC URINE: 7 /HPF
SODIUM SERPL-SCNC: 138 MMOL/L (ref 135–145)
SODIUM SERPL-SCNC: 139 MMOL/L (ref 135–145)
SP GR UR STRIP: 1.02 (ref 1–1.03)
SQUAMOUS EPITHELIAL: <1 /HPF
SYSTOLIC BLOOD PRESSURE - MUSE: NORMAL MMHG
T AXIS - MUSE: 26 DEGREES
UROBILINOGEN UR STRIP-MCNC: NORMAL MG/DL
VENTRICULAR RATE- MUSE: 63 BPM
WBC # BLD AUTO: 8.7 10E3/UL (ref 4–11)
WBC URINE: 79 /HPF

## 2025-04-23 PROCEDURE — 95720 EEG PHY/QHP EA INCR W/VEEG: CPT | Performed by: PSYCHIATRY & NEUROLOGY

## 2025-04-23 PROCEDURE — 87086 URINE CULTURE/COLONY COUNT: CPT

## 2025-04-23 PROCEDURE — 99291 CRITICAL CARE FIRST HOUR: CPT | Mod: 24

## 2025-04-23 PROCEDURE — 94003 VENT MGMT INPAT SUBQ DAY: CPT

## 2025-04-23 PROCEDURE — 83036 HEMOGLOBIN GLYCOSYLATED A1C: CPT | Performed by: STUDENT IN AN ORGANIZED HEALTH CARE EDUCATION/TRAINING PROGRAM

## 2025-04-23 PROCEDURE — 999N000157 HC STATISTIC RCP TIME EA 10 MIN

## 2025-04-23 PROCEDURE — 255N000002 HC RX 255 OP 636: Performed by: NURSE PRACTITIONER

## 2025-04-23 PROCEDURE — 82374 ASSAY BLOOD CARBON DIOXIDE: CPT

## 2025-04-23 PROCEDURE — 250N000009 HC RX 250: Performed by: STUDENT IN AN ORGANIZED HEALTH CARE EDUCATION/TRAINING PROGRAM

## 2025-04-23 PROCEDURE — 99292 CRITICAL CARE ADDL 30 MIN: CPT | Mod: 24 | Performed by: INTERNAL MEDICINE

## 2025-04-23 PROCEDURE — 258N000003 HC RX IP 258 OP 636: Performed by: STUDENT IN AN ORGANIZED HEALTH CARE EDUCATION/TRAINING PROGRAM

## 2025-04-23 PROCEDURE — 36415 COLL VENOUS BLD VENIPUNCTURE: CPT

## 2025-04-23 PROCEDURE — 95714 VEEG EA 12-26 HR UNMNTR: CPT

## 2025-04-23 PROCEDURE — 70553 MRI BRAIN STEM W/O & W/DYE: CPT

## 2025-04-23 PROCEDURE — 83605 ASSAY OF LACTIC ACID: CPT

## 2025-04-23 PROCEDURE — 87040 BLOOD CULTURE FOR BACTERIA: CPT

## 2025-04-23 PROCEDURE — 250N000013 HC RX MED GY IP 250 OP 250 PS 637: Performed by: NURSE PRACTITIONER

## 2025-04-23 PROCEDURE — 250N000013 HC RX MED GY IP 250 OP 250 PS 637: Performed by: STUDENT IN AN ORGANIZED HEALTH CARE EDUCATION/TRAINING PROGRAM

## 2025-04-23 PROCEDURE — 99207 PR NOT IN PERSON INPATIENT CONSULT STATISTICAL MARKER: CPT | Performed by: STUDENT IN AN ORGANIZED HEALTH CARE EDUCATION/TRAINING PROGRAM

## 2025-04-23 PROCEDURE — 99291 CRITICAL CARE FIRST HOUR: CPT | Mod: 24 | Performed by: INTERNAL MEDICINE

## 2025-04-23 PROCEDURE — 81003 URINALYSIS AUTO W/O SCOPE: CPT

## 2025-04-23 PROCEDURE — 83735 ASSAY OF MAGNESIUM: CPT

## 2025-04-23 PROCEDURE — 250N000009 HC RX 250

## 2025-04-23 PROCEDURE — 250N000011 HC RX IP 250 OP 636: Performed by: STUDENT IN AN ORGANIZED HEALTH CARE EDUCATION/TRAINING PROGRAM

## 2025-04-23 PROCEDURE — 83605 ASSAY OF LACTIC ACID: CPT | Performed by: STUDENT IN AN ORGANIZED HEALTH CARE EDUCATION/TRAINING PROGRAM

## 2025-04-23 PROCEDURE — 250N000011 HC RX IP 250 OP 636: Performed by: NURSE PRACTITIONER

## 2025-04-23 PROCEDURE — 93010 ELECTROCARDIOGRAM REPORT: CPT | Mod: XP | Performed by: INTERNAL MEDICINE

## 2025-04-23 PROCEDURE — A9585 GADOBUTROL INJECTION: HCPCS | Performed by: NURSE PRACTITIONER

## 2025-04-23 PROCEDURE — 93010 ELECTROCARDIOGRAM REPORT: CPT | Performed by: INTERNAL MEDICINE

## 2025-04-23 PROCEDURE — 93005 ELECTROCARDIOGRAM TRACING: CPT

## 2025-04-23 PROCEDURE — 70450 CT HEAD/BRAIN W/O DYE: CPT

## 2025-04-23 PROCEDURE — 258N000003 HC RX IP 258 OP 636

## 2025-04-23 PROCEDURE — 83735 ASSAY OF MAGNESIUM: CPT | Performed by: STUDENT IN AN ORGANIZED HEALTH CARE EDUCATION/TRAINING PROGRAM

## 2025-04-23 PROCEDURE — 200N000001 HC R&B ICU

## 2025-04-23 PROCEDURE — 250N000011 HC RX IP 250 OP 636: Performed by: NEUROLOGICAL SURGERY

## 2025-04-23 PROCEDURE — 80069 RENAL FUNCTION PANEL: CPT | Performed by: STUDENT IN AN ORGANIZED HEALTH CARE EDUCATION/TRAINING PROGRAM

## 2025-04-23 PROCEDURE — 74018 RADEX ABDOMEN 1 VIEW: CPT

## 2025-04-23 PROCEDURE — 84100 ASSAY OF PHOSPHORUS: CPT

## 2025-04-23 PROCEDURE — 85018 HEMOGLOBIN: CPT | Performed by: STUDENT IN AN ORGANIZED HEALTH CARE EDUCATION/TRAINING PROGRAM

## 2025-04-23 RX ORDER — LORAZEPAM 2 MG/ML
2 INJECTION INTRAMUSCULAR EVERY 5 MIN PRN
Status: DISCONTINUED | OUTPATIENT
Start: 2025-04-23 | End: 2025-05-04 | Stop reason: HOSPADM

## 2025-04-23 RX ORDER — LORAZEPAM 2 MG/ML
INJECTION INTRAMUSCULAR
Status: COMPLETED
Start: 2025-04-23 | End: 2025-04-23

## 2025-04-23 RX ORDER — GADOBUTROL 604.72 MG/ML
8 INJECTION INTRAVENOUS ONCE
Status: COMPLETED | OUTPATIENT
Start: 2025-04-23 | End: 2025-04-23

## 2025-04-23 RX ORDER — DEXTROSE MONOHYDRATE 100 MG/ML
INJECTION, SOLUTION INTRAVENOUS CONTINUOUS PRN
Status: DISCONTINUED | OUTPATIENT
Start: 2025-04-23 | End: 2025-04-28

## 2025-04-23 RX ORDER — LEVOTHYROXINE SODIUM 125 UG/1
125 TABLET ORAL DAILY
Status: DISCONTINUED | OUTPATIENT
Start: 2025-04-23 | End: 2025-05-04 | Stop reason: HOSPADM

## 2025-04-23 RX ORDER — LORAZEPAM 2 MG/ML
4 INJECTION INTRAMUSCULAR ONCE
Status: COMPLETED | OUTPATIENT
Start: 2025-04-23 | End: 2025-04-23

## 2025-04-23 RX ORDER — LORAZEPAM 2 MG/ML
2 INJECTION INTRAMUSCULAR ONCE
Status: COMPLETED | OUTPATIENT
Start: 2025-04-23 | End: 2025-04-23

## 2025-04-23 RX ORDER — ISOSORBIDE MONONITRATE 30 MG/1
30 TABLET, EXTENDED RELEASE ORAL DAILY
Status: DISCONTINUED | OUTPATIENT
Start: 2025-04-23 | End: 2025-05-04 | Stop reason: HOSPADM

## 2025-04-23 RX ORDER — DEXTROSE MONOHYDRATE 25 G/50ML
25-50 INJECTION, SOLUTION INTRAVENOUS
Status: DISCONTINUED | OUTPATIENT
Start: 2025-04-23 | End: 2025-05-04 | Stop reason: HOSPADM

## 2025-04-23 RX ORDER — ATORVASTATIN CALCIUM 40 MG/1
40 TABLET, FILM COATED ORAL DAILY
Status: DISCONTINUED | OUTPATIENT
Start: 2025-04-23 | End: 2025-05-04 | Stop reason: HOSPADM

## 2025-04-23 RX ORDER — SODIUM CHLORIDE 9 MG/ML
INJECTION, SOLUTION INTRAVENOUS CONTINUOUS
Status: DISCONTINUED | OUTPATIENT
Start: 2025-04-23 | End: 2025-05-04 | Stop reason: HOSPADM

## 2025-04-23 RX ORDER — POTASSIUM CHLORIDE 20MEQ/15ML
20 LIQUID (ML) ORAL ONCE
Status: COMPLETED | OUTPATIENT
Start: 2025-04-23 | End: 2025-04-23

## 2025-04-23 RX ORDER — NICOTINE POLACRILEX 4 MG
15-30 LOZENGE BUCCAL
Status: DISCONTINUED | OUTPATIENT
Start: 2025-04-23 | End: 2025-05-04 | Stop reason: HOSPADM

## 2025-04-23 RX ORDER — DEXTROSE MONOHYDRATE 100 MG/ML
INJECTION, SOLUTION INTRAVENOUS CONTINUOUS PRN
Status: DISCONTINUED | OUTPATIENT
Start: 2025-04-23 | End: 2025-04-25

## 2025-04-23 RX ADMIN — LEVOTHYROXINE SODIUM 125 MCG: 125 TABLET ORAL at 13:32

## 2025-04-23 RX ADMIN — LEVETIRACETAM 1500 MG: 100 INJECTION, SOLUTION INTRAVENOUS at 14:42

## 2025-04-23 RX ADMIN — GADOBUTROL 8 ML: 604.72 INJECTION INTRAVENOUS at 05:06

## 2025-04-23 RX ADMIN — FAMOTIDINE 20 MG: 10 INJECTION, SOLUTION INTRAVENOUS at 08:57

## 2025-04-23 RX ADMIN — DEXAMETHASONE SODIUM PHOSPHATE 8 MG: 10 INJECTION, SOLUTION INTRAMUSCULAR; INTRAVENOUS at 08:58

## 2025-04-23 RX ADMIN — LORAZEPAM 4 MG: 2 INJECTION INTRAMUSCULAR; INTRAVENOUS at 05:48

## 2025-04-23 RX ADMIN — HYDROMORPHONE HYDROCHLORIDE 0.5 MG: 1 INJECTION, SOLUTION INTRAMUSCULAR; INTRAVENOUS; SUBCUTANEOUS at 22:52

## 2025-04-23 RX ADMIN — POTASSIUM CHLORIDE 20 MEQ: 1.5 SOLUTION ORAL at 15:28

## 2025-04-23 RX ADMIN — CEFAZOLIN 1 G: 1 INJECTION, POWDER, FOR SOLUTION INTRAMUSCULAR; INTRAVENOUS at 20:54

## 2025-04-23 RX ADMIN — INSULIN ASPART 3 UNITS: 100 INJECTION, SOLUTION INTRAVENOUS; SUBCUTANEOUS at 03:49

## 2025-04-23 RX ADMIN — SENNOSIDES AND DOCUSATE SODIUM 1 TABLET: 50; 8.6 TABLET ORAL at 20:54

## 2025-04-23 RX ADMIN — DEXAMETHASONE SODIUM PHOSPHATE 8 MG: 10 INJECTION, SOLUTION INTRAMUSCULAR; INTRAVENOUS at 20:54

## 2025-04-23 RX ADMIN — ACETAMINOPHEN 975 MG: 325 TABLET, FILM COATED ORAL at 13:32

## 2025-04-23 RX ADMIN — ACETAMINOPHEN 975 MG: 325 TABLET, FILM COATED ORAL at 22:52

## 2025-04-23 RX ADMIN — CHLORHEXIDINE GLUCONATE 15 ML: 1.2 SOLUTION ORAL at 09:23

## 2025-04-23 RX ADMIN — CEFAZOLIN 1 G: 1 INJECTION, POWDER, FOR SOLUTION INTRAMUSCULAR; INTRAVENOUS at 12:30

## 2025-04-23 RX ADMIN — INSULIN HUMAN 2 UNITS/HR: 1 INJECTION, SOLUTION INTRAVENOUS at 14:51

## 2025-04-23 RX ADMIN — HYDROMORPHONE HYDROCHLORIDE 0.5 MG: 1 INJECTION, SOLUTION INTRAMUSCULAR; INTRAVENOUS; SUBCUTANEOUS at 03:42

## 2025-04-23 RX ADMIN — CHLORHEXIDINE GLUCONATE 15 ML: 1.2 SOLUTION ORAL at 20:54

## 2025-04-23 RX ADMIN — HYDROMORPHONE HYDROCHLORIDE 0.3 MG: 1 INJECTION, SOLUTION INTRAMUSCULAR; INTRAVENOUS; SUBCUTANEOUS at 08:54

## 2025-04-23 RX ADMIN — LORAZEPAM 2 MG: 2 INJECTION INTRAMUSCULAR at 06:05

## 2025-04-23 RX ADMIN — LEVETIRACETAM 2000 MG: 100 INJECTION, SOLUTION INTRAVENOUS at 08:18

## 2025-04-23 RX ADMIN — NOREPINEPHRINE BITARTRATE 0.02 MCG/KG/MIN: 0.02 INJECTION, SOLUTION INTRAVENOUS at 06:10

## 2025-04-23 RX ADMIN — ATORVASTATIN CALCIUM 40 MG: 40 TABLET, FILM COATED ORAL at 13:33

## 2025-04-23 RX ADMIN — LORAZEPAM 4 MG: 2 INJECTION INTRAMUSCULAR at 05:48

## 2025-04-23 RX ADMIN — LORAZEPAM: 2 INJECTION INTRAMUSCULAR; INTRAVENOUS at 05:48

## 2025-04-23 RX ADMIN — SODIUM CHLORIDE 500 ML: 0.9 INJECTION, SOLUTION INTRAVENOUS at 17:30

## 2025-04-23 RX ADMIN — HYDROMORPHONE HYDROCHLORIDE 0.3 MG: 1 INJECTION, SOLUTION INTRAMUSCULAR; INTRAVENOUS; SUBCUTANEOUS at 11:51

## 2025-04-23 RX ADMIN — SODIUM CHLORIDE: 0.9 INJECTION, SOLUTION INTRAVENOUS at 15:57

## 2025-04-23 RX ADMIN — LORAZEPAM 2 MG: 2 INJECTION INTRAMUSCULAR; INTRAVENOUS at 06:05

## 2025-04-23 RX ADMIN — CEFAZOLIN 1 G: 1 INJECTION, POWDER, FOR SOLUTION INTRAMUSCULAR; INTRAVENOUS at 05:14

## 2025-04-23 ASSESSMENT — ACTIVITIES OF DAILY LIVING (ADL)
ADLS_ACUITY_SCORE: 57
ADLS_ACUITY_SCORE: 55
ADLS_ACUITY_SCORE: 57
ADLS_ACUITY_SCORE: 55
ADLS_ACUITY_SCORE: 55
ADLS_ACUITY_SCORE: 57
ADLS_ACUITY_SCORE: 55
ADLS_ACUITY_SCORE: 57
ADLS_ACUITY_SCORE: 57

## 2025-04-23 NOTE — PLAN OF CARE
Goal Outcome Evaluation:      Plan of Care Reviewed With: patient, child    Overall Patient Progress: no changeOverall Patient Progress: no change    Outcome Evaluation: Patient GCS 7 for most of shift. Grimaces to stimulation, minimal eye opening. Able to lift arms off bed and localize pain. Withdraws from pain in bilateral lower extremities with bilateral foot flexion. Remains off of sedation all day. EEG started today. Briefly needed levo for hypotension, blood pressure improved after IV NS Bolus of 750mL. HR occasionally drops to 30s but improves without intervention. Sinus with long 1st degree block with intermittent afib. Vent settings unchanged. SBT failed d/t apnea. + cough. No BM, UOP adequate per cheo. Insulin gtt started for hypoglycemia. Son at bedside and updated. Plan for head CT this evening.      Problem: Adult Inpatient Plan of Care  Goal: Absence of Hospital-Acquired Illness or Injury  Intervention: Prevent Skin Injury  Recent Flowsheet Documentation  Taken 4/23/2025 1800 by Maria T Perez RN  Body Position:   right   turned   supine, head elevated  Taken 4/23/2025 1200 by Maria T Perez RN  Body Position:   turned   left  Taken 4/23/2025 1000 by Maria T Perez RN  Body Position: turned  Taken 4/23/2025 0800 by Maria T Perez RN  Body Position: turned

## 2025-04-23 NOTE — CONSULTS
[facility]    Stroke Telephone Note    I was called by Davie Erickson on 04/23/25 regarding patient Pascual Perea. 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     He underwent R frontotemporal craniotomy with neoplasm excision on 4/22 (yesterday). He had a cardiopulmonary arrest in PACU requiring re-intubation and CPR with ROSC. He also had a seizure in PACU. Patient has been on Keppra 1 g BID and dexamethasone 8 mg BID. He is currently intubated but not Propofol since yesterday afternoon.    MRI today at 5 AM showed postop changes related to right craniotomy for dissection of a mass in the anterior right temporal lobe with area of residual enhancing material in the anterior right temporal region, may represent residual tumor. There was slightly diminished edema in the right frontal/temporal junction and the right-to-left midline shift has decreased from 6 mm to 4 mm.     I was STAT consulted at 6:40 AM re: concern for 1.5 minutes GTC. Per chart review: Around 0600, patient had a minute and half tonic clonic seizure for which 6mg of ativan given per MD. Patient became hypotensive. Levo gtt briefly started.     Bedside RN reported no evidence of abnormal movements or gaze deviation currently and that patient seemed post ictal and not following commands.    Vitals  BP: 130/64   Pulse: 87   Resp: 16   Temp: 98  F (36.7  C)   Weight: 81.3 kg (179 lb 3.7 oz)      Impression  Generalized Tonic clonic seizures  Large Right temporal mass with vasogenic edema s/p excision (4/22) with e/o residual tumor on follow up imaging  A Fib not on anticoagulation currently  S/p cardiopulmonary arrest (4/22) currently  "intubated    Recommendations  - Ordered a mini load of 2g Keppra and increased maintenance dose of Keppra to 1.5 g BID  - Ordered c EEG  - Updated day NCC team who will decide the need to start Propofol based on exam and EEG findings.    My recommendations are based on the information provided over the phone by Pascual Perea's in-person providers. They are not intended to replace the clinical judgment of his in-person providers. I was not requested to personally see or examine the patient at this time.     Kami Novak MD  Vascular Neurology    To page me or covering stroke neurology team member, click here: AMCOM  Choose \"On Call\" tab at top, then select \"NEUROLOGY/ALL SITES\" from middle drop-down box, press Enter, then look for \"stroke\" or \"telestroke\" for your site.   "

## 2025-04-23 NOTE — PLAN OF CARE
Goal Outcome Evaluation:      Plan of Care Reviewed With: patient    Overall Patient Progress: no changeOverall Patient Progress: no change    Outcome Evaluation: Patient remains intubated. No sedation. Perrl. Opens eyes spontaneously but does not track. Patient does not follow commands. Spontaneously moves all extremities. Around 0600, patient had a minute and half tonic clonic seizure. 6mg of ativan given per MD. Patient became hypotensive. Levo gtt briefly started. Neurosurg and neurocrit aware. MRI obtained this morning.      Problem: Adult Inpatient Plan of Care  Goal: Plan of Care Review  Description: The Plan of Care Review/Shift note should be completed every shift.  The Outcome Evaluation is a brief statement about your assessment that the patient is improving, declining, or no change.  This information will be displayed automatically on your shiftnote.  Outcome: Not Progressing  Flowsheets (Taken 4/23/2025 0700)  Outcome Evaluation: Patient remains intubated. No sedation. Perrl. Opens eyes spontaneously but does not track. Patient does not follow commands. Spontaneously moves all extremities. Around 0600, patient had a minute and half tonic clonic seizure. 6mg of ativan given per MD. Patient became hypotensive. Levo gtt briefly started. Neurosurg and neurocrit aware. MRI obtained this morning.  Plan of Care Reviewed With: patient  Overall Patient Progress: no change     Problem: Seizure, Active Management  Goal: Absence of Seizure/Seizure-Related Injury  Outcome: Not Progressing  Intervention: Prevent Seizure-Related Injury  Flowsheets  Taken 4/23/2025 0700  Sensory Stimulation Regulation:   lighting decreased   quiet environment promoted   care clustered  Aspiration Prevention During Seizure: positioned on side during seizure  Seizure Precautions:   activity supervised   clutter-free environment maintained   emergency equipment at bedside  Taken 4/23/2025 0500  Sensory Stimulation Regulation:   lighting  decreased   quiet environment promoted   care clustered  Taken 4/23/2025 0400  Sensory Stimulation Regulation:   lighting decreased   quiet environment promoted   care clustered  Taken 4/23/2025 0000  Sensory Stimulation Regulation:   lighting decreased   quiet environment promoted   care clustered     Problem: Restraint, Nonviolent  Goal: Absence of Harm or Injury  Outcome: Progressing  Intervention: Implement Least Restrictive Safety Strategies  Recent Flowsheet Documentation  Taken 4/23/2025 0500 by Milly Moore RN  Medical Device Protection:   torso covered   tubing secured  Taken 4/23/2025 0400 by Milly Moore RN  Medical Device Protection:   torso covered   tubing secured  Taken 4/23/2025 0000 by Milly Moore RN  Medical Device Protection:   torso covered   tubing secured  Taken 4/22/2025 2000 by Milly Moore RN  Medical Device Protection:   torso covered   tubing secured  Intervention: Protect Dignity, Rights and Personal Wellbeing  Recent Flowsheet Documentation  Taken 4/22/2025 2000 by Milly Moore RN  Trust Relationship/Rapport: care explained  Intervention: Protect Skin and Joint Integrity  Recent Flowsheet Documentation  Taken 4/23/2025 0600 by Milly Moore RN  Body Position:   turned   lower extremity elevated   upper extremity elevated  Taken 4/23/2025 0500 by Milly Moore RN  Body Position: (lifted to MRI cart) other (see comments)  Taken 4/23/2025 0400 by Milly Moore RN  Body Position: (lifted to MRI cart) other (see comments)  Taken 4/23/2025 0200 by Milly Moore RN  Body Position:   turned   lower extremity elevated   upper extremity elevated  Taken 4/23/2025 0000 by Milly Moore RN  Body Position:   turned   lower extremity elevated   upper extremity elevated  Taken 4/22/2025 2200 by Milly Moore RN  Body Position:   turned   right   lower extremity elevated   upper extremity elevated  Taken 4/22/2025 2000 by Oscar  Milly WEBB, RN  Body Position:   turned   left   lower extremity elevated   upper extremity elevated

## 2025-04-23 NOTE — CODE/RAPID RESPONSE
Rapid Response:    Patient experienced a tonic clonic seizure at aprox 5:45 a.m. that lasted for approximately a minute and a half followed by roving eye movements, twitching eyelids, and rhythmic tensing of his sternocleidomastoids.  This was associated with a sudden drop in his blood pressure with maps in the high 40s low 50s.  Approximately 5 minutes after the tonic-clonic seizure ceased but he was still having the other signs of a seizure he was given Ativan 2 mg every 5 minutes x 3 with cessation of the seizure signs.    He was treated with a 500cc bolus of NS and NE was initiated but was able to be discontinued after about 10 minutes.    EKG revealed atrial fibrillation with normal rate.  MRI this morning with postsurgical changes but no other acute concerning findings.  He is already being treated with Keppra 1 g every 12 hours and neurocritical care has already been consulted.      Critical care time 40 minutes excluding procedures.      Isaiah Taylor M.D.  Pulmonary & Critical Care  Pager: Click Here to page

## 2025-04-23 NOTE — PROGRESS NOTES
Contacted regarding seizure.  Given dose of Ativan.  Now post ictal, intubated, PERR, does not follow commands. Not on propofol.    Brain MRI 4/23/25    IMPRESSION:  1.  Postop changes related to right pterional craniotomy for dissection of a mass in the anterior right temporal lobe.  2.  4.6 x 2.8 x 3 cm (AP x TR x CC) zone of residual enhancing material in the anterior right temporal region.  3.  Slightly diminished edema in the right frontal/temporal junction.  4.  4 mm right-to-left midline shift, previously 6 mm.    RECOMMENDATIONS:  Neurology was consulted yesterday afternoon but have not evaluated patient.  Please page Neurology for consult and recommendation regarding seizure management likely needs increase in Keppra and or second agent for seizure management.    Raeann Bishop, MPAS  [facility]  [address] [address]    Tel [phone]  Pager [phone]

## 2025-04-23 NOTE — CONSULTS
"CLINICAL NUTRITION SERVICES - ASSESSMENT NOTE    Registered Dietitian Interventions:  - Initiate TF with goal as follows:   Novasource Renal @ 50 mL/hr to provide 2200 kcal (27 kcal/kg), 100 g protein (1.2 g/kg), 201 g CHO, no fiber, 789 mL free water.   - Hold TF x 2 hours for levothyroxine dosing.     Start TF @ 10 ml/hr. Advance by 10 mL q 12 hours until goal rate reached.   Flush 30 mL q 4 hours, or per provider.        REASON FOR ASSESSMENT  Provider order - Registered Dietitian to order TF per Medical Nutrition Therapy Guidelines    INFORMATION OBTAINED  Patient not available for interview due to intubated. No family present during attempted visit.    NUTRITION HISTORY  - Unable to assess.     CURRENT NUTRITION ORDERS  Diet: NPO  Enteral Nutrition: Osmolite @ 20 mL/hr initiated per MD this morning (not yet started)    CURRENT INTAKE/TOLERANCE  N/A    LABS  Nutrition-relevant labs:   Phos 5.7 (H)  K, Mg NL  BUN 26.5 (H), Cr 1.3 (H)    MEDICATIONS  High sliding scale insulin   Levothyroxine via FT @ 0900   Bowel meds available   Prop off, Norepi off     ANTHROPOMETRICS  Height: 180.3 cm (5' 11\")  Admission Weight: 83.8 kg (184 lb 11.2 oz) (04/22/25 0617)   Most Recent Weight: 81.3 kg (179 lb 3.7 oz) (04/23/25 0342)  IBW: 78.2 kg (104%)  BMI: Body mass index is 25 kg/m .   Weight History: Wt trends difficult to assess, many recent weights appear reported rather than measured. Since Jan 2, wt change only indicates up to a 2% decrease.   Wt Readings from Last 10 Encounters:   04/23/25 81.3 kg (179 lb 3.7 oz)   04/16/25 83.9 kg (185 lb)   04/09/25 86.2 kg (190 lb)   02/17/25 86.2 kg (190 lb)   02/14/25 87.8 kg (193 lb 9.6 oz)   01/02/25 85.7 kg (189 lb)   12/27/24 83 kg (183 lb)   12/24/24 86.1 kg (189 lb 13.1 oz)   12/09/24 88.6 kg (195 lb 6.4 oz)   11/13/24 84.4 kg (186 lb)     Date/Time Weight Weight Method   04/23/25 0342 81.3 kg (179 lb 3.7 oz) Bed scale   04/22/25 1430 82.2 kg (181 lb 3.5 oz) --   04/22/25 " 0617 83.8 kg (184 lb 11.2 oz)      ASSESSED NUTRITION NEEDS  Dosing Weight: 81.3 kg, based on actual wt  Estimated Energy Needs: 4243-3448 kcals/day (25 - 30 kcals/kg)  Justification: Vented  Estimated Protein Needs:  grams protein/day (1.2 - 1.5 grams of pro/kg)  Justification: Maintenance, hypercatabolism   Estimated Fluid Needs: 1 mL/kcal/day   Justification: Maintenance    SYSTEM AND PHYSICAL FINDINGS    - OG in place for enteral nutrition   GI symptoms: Reviewed - no BM this admission  Skin/wounds: Reviewed    MALNUTRITION  % Intake: Unable to assess  % Weight Loss: Unable to assess   Subcutaneous Fat Loss: Unable to assess  Muscle Loss: Unable to assess  Fluid Accumulation/Edema: None noted  Malnutrition Diagnosis: Unable to determine due to lack of information  Malnutrition Present on Admission: Unable to assess    NUTRITION DIAGNOSIS  Inadequate protein energy intake related to intubated as evidenced by no nutrition received since admission, plan for TF.     INTERVENTIONS  Collaboration by nutrition professional with other providers - Attendance at interdisciplinary rounds   Enteral nutrition management - ordered  Management of flushing of feeding tube - ordered    GOALS  TF @ goal to provide % estimated needs.     MONITORING/EVALUATION  Progress toward goals will be monitored and evaluated per policy.

## 2025-04-23 NOTE — CONSULTS
[facility]    Stroke Consult Note    Reason for Consult:  Seizure post op crani with cardiopulmonary arrest in PACU (another seizure 4/23/2025)    Chief Complaint: No chief complaint on file.     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      Per chart review: Dayday arrived to [facility] ED on 4/16/2025 and presented with generalized weakness, fatigue, word finding difficulty for two weeks. Reports pt was having unsteady gait and using a cane and has history or falls. Associated symptoms include bilateral foot pain, high BG, diarrhea, and feeling cold. CT head showed large mass in right anterior temporal lobe and associated hemorrhage, vasogenic edema, midline shift. Neurosurgery was consulted and recommended MRI brain, Decadron 10 mg and hold Eliquis.  Pt was admitted to neurology floor. Hematology/oncology was consulted d/t hx of neuroendocrine tumor and recommended CT neck and C/A/P, continue decadron.    Dayday was discharged on 4/18 with oral dexamethasone and Keppra 500 BID while Eliquis was held. Neurosurgery agreed to postpone Right fronto-temporalcraniotomy so he could spend Easter with family. He returned on 4/22 for right frontopariteal craniotomy with neoplasm excision. On arrival to PACU, Dayday had grand mal seizure and 2 mg of midalozam was given. Then, Dayday had a hypotensive episode after extubation in PACU and code blue was activated at 1316 that resulted in CPR (0.5 mg epi 2x) with ROSC and reintubation. He was loaded with 2 g Keppra and transferred to ICU. Post CT scan reported expected postsurgical changes.    Around 0545 this AM Dayday had an episode of tonic clonic seizures that  lasted 1.5 minutes followed by roving eye movements, twitching of eyelids, rhythmic tension of sternocleidomastoids. Dayday was given total 6 mg of ativan and became hypotensive. Levo gtt was started and MRI was obtained. Pt seemed postictal and not following commands. Dayday was given 2 g of Keppra at 0800. Currently on 1.5 g maintenance dose. Patient continues to be encephalopathic/somnolent, propofol had been stopped yesterday. No ongoing seizure like activity detected on exam. EEG was connected this AM and pending read.    Our team was messaged by Dr. Novak today to see if patient is alert or postictal and if propofol should be started before EEG. Dr. Flowers recommended holding propafol and waiting for EEG. Dayday has not had propofol since yesterday afternoon.    Evaluation Summarized    MRI/Head CT CT head 4/16: Large right anterior temporal lobe mass 5.3 x 4 x 3.8 cm and associated hemorrhage posterior to mass. Significant vasogenic edema with right to left midline shift 6.6 mm.     CT head 4/22: Interval right frontopariteal and temporal cranioomy. Small hemorrhage and gas cavity. Stable vasogenic edema. Persistent mild effacement of right lateral ventricle.     MRI brain 4/16: Large mass right temporal lobe with vasogenic edema measures 5.3x3.8x4.3. Right to left midline shift 4 to 5 mm.     MRI brain 4/23: Postop changes related to right pterional craniotomy in anterior right temporal lobe. 4.6 x 2.8 x 3 cm area may represent residual tumor. Slightly diminished edema in right temporal/frontal area. 4 mm right to left midline shift.    Intracranial Vasculature CTA head 4/17: NO LVO. Plaque in bilateral carotid siphons. Unchanged 6 mm leftward midline shift. Anterior displacement of right MCA due to right mass.    Cervical Vasculature CTA neck 4/17: Plaque in bilateral carotid bifurcation     Echocardiogram    EKG/Telemetry Atrial fibrillation with LBBB   Other Testing CT soft tissue neck 4/17: No evidence of  "residual/recurrent disease in neck, no enlarged or abnormal cervical lymph nodes.     CT C/A/P 4/17: No evidence of progressive metastatic disease      LDL  No lab value available in past 30 days   A1C  2/10/2025: 6.9 %   Troponin 4/22/2025: 53 ng/L       Impression  # Large right anterior temporal lobe mass excision with associated hemorrhage and cerebral edema s/p right frontotemporal craniotomy   # Postoperative cardiopulmonary arrest s/p CPR (epi x 2) with ROSC  # Seizures 2/2 above  # Atrial fibrillation (not currently anticoagulated/ stopped due to scheduled craniotomy)      Recommendations   - Neuro checks and vital signs every 1 hour  -appreciate neurosurgery recommendations  - Continue SBP < 140   - Give next dose of Keppra 1.5g at 1230 and continue maintenance Keppra 1.5 g every 12 hours following  - Seizure precautions, Continuous video EEG for seizure monitoring  - PRN IV Ativan 2 mg q 5 minutes if clinical seizure activity noted and lasts longer than 2 minutes, STAT page to NCC team  -Propofol is held to see if patient will become alert to attempt extubation.  -hold any anticoagulation/antiplatelets/NSAIDs until cleared by neurosurgery  -elevate head of bed 30 degrees  -recommend euthermia, euglycemia (recommend BG <180), eunatremia    DVT prophylaxis: SCDs, hold pharmacologic VTE prophylaxis until cleared by neurosurgery    Patient Follow-up    - final recommendation pending work-up    Thank you for this consult. We will continue to follow.     Layla Alicea PA-C  Vascular Neurology    To page me or covering stroke neurology team member, click here: [facility]COM  Choose \"On Call\" tab at top, then select \"NEUROLOGY/ALL SITES\" from middle drop-down box, press Enter, then look for \"stroke\" or \"telestroke\" for your site.  _____________________________________________________    Clinically Significant Risk Factors              # Anion Gap Metabolic Acidosis: Highest Anion Gap = 30 mmol/L in last 2 days, will " "monitor and treat as appropriate     # Coagulation Defect: INR = 1.21 (Ref range: 0.85 - 1.15) and/or PTT = 21 Seconds (Ref range: 22 - 38 Seconds), will monitor for bleeding  # Thrombocytopenia: Lowest platelets = 104 in last 2 days, will monitor for bleeding   # Hypertension: Noted on problem list           # DMII: A1C = 6.9 % (Ref range: 0.0 - 5.6 %) within past 6 months   # Overweight: Estimated body mass index is 25 kg/m  as calculated from the following:    Height as of this encounter: 1.803 m (5' 11\").    Weight as of this encounter: 81.3 kg (179 lb 3.7 oz)., PRESENT ON ADMISSION       # Financial/Environmental Concerns:             Past Medical History    Past Medical History:   Diagnosis Date    Adhesive capsulitis of shoulder     Allergic rhinitis 01/07/2008    Problem list name updated by automated process. Provider to review      Anemia 03/10/2014    Anemia, unspecified     Antiplatelet or antithrombotic long-term use     Arrhythmia     Atrial fib/flutter    Atrial fibrillation, unspecified type (H) 05/02/2018    CAD (coronary artery disease)     Chemotherapy-induced neutropenia 07/19/2024    Coronary artery disease involving native coronary artery of native heart without angina pectoris 02/16/2025    Coronary atherosclerosis     Nuc Stress 4/15/16: On both stress and rest images there is a very small size mild intensity apical photopenic defect which is of equal extent and intensity on both stress and rest images. Gated images demonstrated no regional wall motion abnormalities. The left ventricular ejection fraction was calculated to be 61% with stress.      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    History of cardioversion 04/24/2018    a single synchronized shock of 120 joules restored normal sinus rhythm    History of " cardioversion 02/13/2019    single shock , 200 joules successul in restoring NSR    History of colonic polyps 03/24/2017    Hyperlipidemia LDL goal <70 05/26/2011    Hypertension     Hypothyroidism     Hypothyroidism, unspecified type 08/15/2016    Impotence of organic origin     Laceration of finger 06/2010     left thumb s/p sutures    Numbness and tingling     diabetic neuropathy bilateral feet    BRANDON (obstructive sleep apnea)     intolerant of CPAP, uses it occasionally.  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 disease, with long-term current use of insulin (H) 02/16/2025    Type 2 diabetes mellitus without complication  (goal A1C<8) 10/24/2015    Typical atrial flutter (H) 04/18/2016    Unspecified hypothyroidism     Vitamin D deficiency 09/03/2011     Medications   Home Meds  Prior to Admission medications    Medication Sig Start Date End Date Taking? Authorizing Provider   Artificial Saliva (ACT DRY MOUTH) LOZG Take 1 lozenge by mouth at bedtime.   Yes Unknown, Entered By History   atorvastatin (LIPITOR) 40 MG tablet TAKE 1 TABLET (40 MG) BY MOUTH DAILY 3/4/25  Yes Andrew Elizalde MD   azelastine 137 MCG/SPRAY SOLN [place] 2 sprays into both nostrils 2 times daily.  Patient taking differently: Spray 2 sprays into both nostrils 2 times daily as needed (runny nose). 9/25/24  Yes Andrew Elizalde MD   cyanocobalamin (VITAMIN B-12) 1000 MCG tablet Take 1 tablet (1,000 mcg) by mouth daily. 1/2/25  Yes Florencia Butler MD   dexAMETHasone (DECADRON) 4 MG tablet Take 1 tablet (4 mg) by mouth every 12 hours. 4/18/25  Yes  Qing Gillespie PA-C   fenofibrate micronized (LOFIBRA) 67 MG capsule TAKE 1 CAPSULE BY MOUTH EVERY MORNING BEFORE BREAKFAST 6/25/24  Yes Andrew Elizalde MD   Insulin Aspart FlexPen 100 UNIT/ML SOPN INJECT UNDER THE SKIN 3 TIMES PER DAY (BEFORE MEALS) 3 UNITS PER CARB CHOICE (15GM) APPROX 10-15 UNITS/MEAL PLUS PRIMING 3/18/25  Yes Andrew Elizalde MD   insulin glargine (LANTUS SOLOSTAR) 100 UNIT/ML pen Inject 40 Units subcutaneously 2 times daily. 4/18/25  Yes Cori Jeffery MD   isosorbide mononitrate (IMDUR) 30 MG 24 hr tablet Take 1 tablet (30 mg) by mouth daily 5/17/24  Yes Andrew Elizalde MD   levETIRAcetam (KEPPRA) 500 MG tablet Take 1 tablet (500 mg) by mouth 2 times daily. 4/18/25  Yes Qing Gillespie PA-C   levothyroxine (SYNTHROID/LEVOTHROID) 125 MCG tablet Take 1 tablet (125 mcg) by mouth daily 4/24/24  Yes Andrew Elizalde MD   polyethylene glycol 400 (BLINK TEARS) 0.25 % SOLN ophthalmic solution Place 1 drop into both eyes daily.   Yes Unknown, Entered By History   blood glucose (ACCU-CHEK NORMAN PLUS) test strip USE TO TEST BLOOD SUGAR 3 TIMES A DAY OR AS DIRECTED 9/4/24   Andrew Elizalde MD   blood glucose calibration (ACCU-CHEK NORMAN) solution USE AS DIRECTED 10/6/20   Andrew Elizalde MD   chlorhexidine (HIBICLENS) 4 % solution Apply topically See Admin Instructions. The night before surgery, take shower as you normally would. Then apply 1/2 bottle as body wash from neck down, avoid groin, let sit for 1 minute, and rinse off. Repeat the morning of surgery. 4/18/25   Davie Erickson MD   Continuous Glucose  (FREESTYLE DELFINA 2 READER) CHRYSTAL 1 Device continuously. 4/16/25   Andrew Elizalde MD   Continuous Glucose Sensor (FREESTYLE DELFINA 2 SENSOR) [facility] [place] 1 SENSOR AND CHANGE EVERY 14 DAYS AS DIRECTED 2/27/25   Andrew Elizalde MD   insulin pen needle (GLOBAL EASE INJECT PEN NEEDLES) 31G X 5 MM miscellaneous Use one pen needle 5 times a day 3/26/25   Andrew Elizalde MD       Scheduled Meds  Current  Facility-Administered Medications   Medication Dose Route Frequency Provider Last Rate Last Admin    acetaminophen (TYLENOL) tablet 975 mg  975 mg Oral or Feeding Tube Q8H Nelly Coon NP        atorvastatin (LIPITOR) tablet 40 mg  40 mg Oral or Feeding Tube Daily Leydi Meyers MD        ceFAZolin (ANCEF) 1 g vial to attach to  ml bag for ADULT or 50 ml bag for PEDS  1 g Intravenous Q8H Nelly Coon NP   1 g at 04/23/25 0514    chlorhexidine (PERIDEX) 0.12 % solution 15 mL  15 mL Mouth/Throat Q12H Leydi Meyers MD   15 mL at 04/23/25 0923    dexAMETHasone PF (DECADRON) injection 8 mg  8 mg Intravenous Q12H Davie Erickson MD   8 mg at 04/23/25 0858    famotidine (PEPCID) tablet 20 mg  20 mg Oral or Feeding Tube Q24H Davie Erickson MD        Or    famotidine (PEPCID) injection 20 mg  20 mg Intravenous Q24H Davie Erickson MD   20 mg at 04/23/25 0857    insulin aspart (NovoLOG) injection (RAPID ACTING)  1-12 Units Subcutaneous Q4H Leydi Meyers MD   6 Units at 04/23/25 0910    [Held by provider] isosorbide mononitrate (IMDUR) 24 hr tablet 30 mg  30 mg Oral Daily Leydi Meyers MD        levETIRAcetam (KEPPRA) 1,500 mg in sodium chloride 0.9 % 125 mL intermittent infusion  1,500 mg Intravenous Q12H Kami Novak MD        levothyroxine (SYNTHROID/LEVOTHROID) tablet 125 mcg  125 mcg Oral or Feeding Tube Daily Leydi Meyers MD        polyethylene glycol (MIRALAX) Packet 17 g  17 g Oral or Feeding Tube Daily Nelly Coon NP        senna-docusate (SENOKOT-S/PERICOLACE) 8.6-50 MG per tablet 1 tablet  1 tablet Oral or Feeding Tube BID Nelly Coon NP        sodium chloride (PF) 0.9% PF flush 3 mL  3 mL Intracatheter Q8H BOYD Nelly Coon NP   3 mL at 04/23/25 0517       Infusion Meds  Current Facility-Administered Medications   Medication Dose Route Frequency Provider Last Rate Last Admin    dextrose 10% infusion   Intravenous Continuous PRN Leydi Meyers MD         propofol (DIPRIVAN) infusion  5-75 mcg/kg/min Intravenous Continuous Leydi Meyers MD   Stopped at 04/22/25 1630    And    Medication Instruction   Does not apply Continuous PRN Leydi Meyers MD        norepinephrine (LEVOPHED) 4 mg in  mL PERIPHERAL infusion  0.01-0.2 mcg/kg/min Intravenous Continuous Lenore Guthrie NP   Stopped at 04/23/25 0630       Allergies   Allergies   Allergen Reactions    Omeprazole      diarrhea    Pantoprazole      diarrhea          PHYSICAL EXAMINATION   Temp:  [97.8  F (36.6  C)-98.4  F (36.9  C)] 98.2  F (36.8  C)  Pulse:  [] 63  Resp:  [] 16  BP: ()/(45-86) 136/71  MAP:  [32 mmHg-179 mmHg] 73 mmHg  Arterial Line BP: ()/() 122/52  FiO2 (%):  [30 %-50 %] 50 %  SpO2:  [83 %-100 %] 100 %    General:  appears not in distress, eyes closed when I walked into his room, not sedated but recently received ativan    Neurologic exam:    Mental status: Stupor, opens eyes in response to painful chest noxious, does not follow commands of opening eyes, holding two fingers up, or moving toes, nonverbal    Cranial nerves: doesn't blink to threat bilaterally, unable to assess extraocular movements , right gaze preference     Motor: able to move all limbs in response to painful stimuli, withdrawals to painful stimuli with right leg more than left    Sensation: occasionally responds to painful stimuli in upper and lower extremities     Coordination: unable to test coordination    Gait:  deferred       Imaging  I personally reviewed all imaging; relevant findings per HPI.    Labs Data   CBC  Recent Labs   Lab 04/23/25  0521 04/22/25  1447 04/22/25  1349   WBC 8.7 18.7* 14.9*   RBC 3.42* 3.51* 3.93*   HGB 10.5* 10.7* 11.9*   HCT 30.6* 31.8* 38.4*   * 157 170     Basic Metabolic Panel   Recent Labs   Lab 04/23/25  0844 04/23/25  0348 04/22/25  2352 04/22/25  1630 04/22/25  1447 04/22/25  1431 04/22/25  1349 04/22/25  1332 04/18/25  1243 04/18/25  0816   NA   --   --   --   --  139  --  143 139  --  135   POTASSIUM  --   --   --   --  4.5  --  3.9 3.7   < > 4.7  4.7   CHLORIDE  --   --   --   --  100  --  102  --   --  100   CO2  --   --   --   --  19*  --  11*  --   --  19*   BUN  --   --   --   --  26.5*  --  25.4*  --   --  22.8   CR  --   --   --   --  1.30*  --  1.32*  --   --  1.04   * 263* 234*   < > 343*   < > 301*  --    < > 388*   STARR  --   --   --   --  9.1  --  9.8  --   --  10.0    < > = values in this interval not displayed.     Liver Panel  Recent Labs   Lab 04/22/25  1349   PROTTOTAL 6.0*   ALBUMIN 3.8   BILITOTAL 0.6   ALKPHOS 61   AST 30   ALT 30     INR    Recent Labs   Lab Test 04/22/25  1447 04/22/25  1349 05/22/19  0948   INR 1.21* 1.30* 1.43*           Stroke Consult Data Data   This was a non-emergent, non-telestroke consult.  I personally examined and evaluated the patient today. At the time of my evaluation and management the patient was in critical condition today due to craniotomy, seizures, cardiopulmonary arrest s/p CPR/ROSC, hemorrhage, cerebral edema, respiratory failure s/p intubation. I personally managed care with team. Key decisions made today included ensuring EEG placement, PRN ativan and Keppra maintenance dosage. I spent a total of 60 minutes providing critical care services, evaluating the patient, directing care and reviewing laboratory values and radiologic reports.

## 2025-04-23 NOTE — PROGRESS NOTES
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 He had a seizure and cardiopulmonary arrest in PACU requiring re-intubation and CPR with ROSC. The patient was admitted to the ICU for medical management.     I have personally reviewed the daily labs, imaging studies, cultures and discussed the case with referring physician and consulting physicians.     Neuro:  Right temporal brain tumor s/p craniotomy  History of neuroendocrine tumor s/p chemoradiation  Post-operative seizure  - Neurosurgery following, pending neurology consultation  - Propofol for sedation  - Multimodal pain control  - Continue Keppra for seizures  - Seizure precautions and neuro checks  - EEG  - SUSI drain per neurosurgery     CV:  Cardiopulmonary arrest  History of atrial fibrillation on chronic anticoagulation  History of CAD s/p CABG  Hyperlipidemia  - MAP goal > 65, currently requiring intermittent levophed  - Holding anticoagulation, PTA Imdur  - Restart home statin     Pulm:  Respiratory arrest  - Wean PEEP/FiO2, as able  - Maintain intubation until neurological status is determined for airway protection     GI:  Risk for calorie malnutrition  - RD to manage TF  - PPI prophylaxis     Renal:  Chronic kidney disease (baseline creatinine 1.1-1.3)  - Monitor UOP, Cr, I/O  - Avoid nephrotoxic agents     ID:  No active issues      Heme:  Acute blood  "loss anemia  - Daily CBC ordered  - Transfuse hemoglobin <7     Endo:  Type 2 diabetes mellitus (A1c 6.9%)  Hypothyroidism  - Insulin sliding scale, increased from medium to high  - Goal glucose <180  - PTA levothyroxine reordered    Clinically Significant Risk Factors              # Anion Gap Metabolic Acidosis: Highest Anion Gap = 30 mmol/L in last 2 days, will monitor and treat as appropriate   # Coagulation Defect: INR = 1.21 (Ref range: 0.85 - 1.15) and/or PTT = 21 Seconds (Ref range: 22 - 38 Seconds), will monitor for bleeding  # Thrombocytopenia: Lowest platelets = 104 in last 2 days, will monitor for bleeding   # Hypertension: Noted on problem list           # DMII: A1C = 6.9 % (Ref range: 0.0 - 5.6 %) within past 6 months   # Overweight: Estimated body mass index is 25 kg/m  as calculated from the following:    Height as of this encounter: 1.803 m (5' 11\").    Weight as of this encounter: 81.3 kg (179 lb 3.7 oz)., PRESENT ON ADMISSION     # Financial/Environmental Concerns:                PPx:  1. DVT: mechanical only   2. VAP: HOB 30 degrees, chlorhexidine rinse  3. Stress Ulcer: PPI  4. Restraints: Nonviolent soft two point restraints required and necessary for patient safety and continued cares and good effect as patient continues to pull at necessary lines, tubes despite education and distraction. Will readdress daily.   5. Wound care - per unit routine   6. Feeding - TF  7. Family to be updated via phone    Unable to obtain ROS 2/2 sedation/intubation.     /64   Pulse 92   Temp 98  F (36.7  C) (Axillary)   Resp 16   Ht 1.803 m (5' 11\")   Wt 81.3 kg (179 lb 3.7 oz)   SpO2 100%   BMI 25.00 kg/m    Gen: supine, NAD  Neuro: not sedated, does not follow commands, did not withdraw to painful stimulation on my exam, although nursing reports that he was just withdrawing in all four extremities, pupils equal  HEENT: anicteric  Card: RRR  Pulm: clear b/l  Abd: soft, non-distended  MSK: no edema, no " acute joint abnormality  Skin: no obvious rash    FiO2 (%): 50 %, Resp: 16, Vent Mode: VC/AC, Resp Rate (Set): 16 breaths/min, Tidal Volume (Set, mL): 550 mL, PEEP (cm H2O): 5 cmH2O, Resp Rate (Set): 16 breaths/min, Tidal Volume (Set, mL): 550 mL, PEEP (cm H2O): 5 cmH2O      Intake/Output Summary (Last 24 hours) at 4/23/2025 0708  Last data filed at 4/23/2025 0610  Gross per 24 hour   Intake 3091.31 ml   Output 2625 ml   Net 466.31 ml       Labs:       04/23/25  0521   WBC 8.7   RBC 3.42*   HGB 10.5*   HCT 30.6*   MCV 90   MCH 30.7   MCHC 34.3   RDW 14.6   *     Lab Results   Component Value Date     04/22/2025    POTASSIUM 4.5 04/22/2025    CHLORIDE 100 04/22/2025    CO2 19 (L) 04/22/2025    ANIONGAP 20 (H) 04/22/2025     (H) 04/23/2025    BUN 26.5 (H) 04/22/2025    CR 1.30 (H) 04/22/2025    GFRESTIMATED 55 (L) 04/22/2025    STARR 9.1 04/22/2025       pH Arterial   Date Value Ref Range Status   04/22/2025 7.38 7.35 - 7.45 Final     pH Arterial POCT   Date Value Ref Range Status   04/22/2025 7.03 (LL) 7.35 - 7.45 Final     Comment:     Chart Critical result. Doctor notified     pCO2 Arterial   Date Value Ref Range Status   04/22/2025 35 35 - 45 mm Hg Final     pCO2 Arterial POCT   Date Value Ref Range Status   04/22/2025 46 (H) 35 - 45 mm Hg Final     Comment:     Chart Critical result. Doctor notified     pO2 Arterial   Date Value Ref Range Status   04/22/2025 154 (H) 80 - 105 mm Hg Final     pO2 Arterial POCT   Date Value Ref Range Status   04/22/2025 486 (H) 80 - 105 mm Hg Final     Comment:     Chart Critical result. Doctor notified     Bicarbonate Arterial   Date Value Ref Range Status   04/22/2025 21 21 - 28 mmol/L Final     Bicarbonate Arterial POCT   Date Value Ref Range Status   04/22/2025 12 (L) 21 - 28 mmol/L Final     Comment:     Chart Critical result. Doctor notified     O2 Sat, Arterial   Date Value Ref Range Status   04/22/2025 100.0 (H) 95.0 - 96.0 % Final     O2 Sat, Arterial POCT    Date Value Ref Range Status   04/22/2025 100 (H) 95 - 96 % Final     Comment:     Chart Critical result. Doctor notified     Base Excess/Deficit Arterial   Date Value Ref Range Status   04/22/2025 -3.9 (L) -3.0 - 3.0 mmol/L Final     Base Excess/Deficit (+/-) POCT   Date Value Ref Range Status   04/22/2025 -18.0 (L) -3.0 - 3.0 mmol/L Final     Comment:     Chart Critical result. Doctor notified     Imaging:   EXAM: MR BRAIN WITHOUT AND WITH CONTRAST  LOCATION: [facility]  DATE: 04/23/2025     INDICATION: POD1, craniotomy.  COMPARISON: 04/22/2025, 04/26/2025.  CONTRAST: 8 mL Gadavist.  TECHNIQUE: Routine multiplanar multisequence head MRI without and with intravenous contrast.     FINDINGS:  INTRACRANIAL CONTENTS: No acute or subacute infarct. Interval right pterional craniotomy for debulking of a mass in the anterior right temporal lobe. 4.6 x 2.8 x 3 cm (AP by TR by CC) residual enhancing lesion in the anterior right temporal region.   Slightly diminished edema in the right frontal/temporal junction. 4 mm right-to-left midline shift. Previously 6 mm. Normal ventricles and sulci. Normal position of the cerebellar tonsils.     SELLA: No abnormality accounting for technique.     OSSEOUS STRUCTURES/SOFT TISSUES: Normal marrow signal. The major intracranial vascular flow-voids are maintained.      ORBITS: No abnormality accounting for technique.      SINUSES/MASTOIDS: Mild mucosal thickening scattered about the paranasal sinuses. No middle ear or mastoid effusion.                                                                    IMPRESSION:  1.  Postop changes related to right pterional craniotomy for dissection of a mass in the anterior right temporal lobe.  2.  4.6 x 2.8 x 3 cm (AP x TR x CC) area of residual enhancing material in the anterior right temporal region, may represent residual tumor.  3.  Slightly diminished edema in the right frontal/temporal junction.  4.  4 mm right-to-left  midline shift, previously 6 mm.    Discussed with staff, Dr. Anne.    Leydi Meyers MD  Surgical Critical Care Fellow

## 2025-04-23 NOTE — PROGRESS NOTES
Patient is intubated.  GCS E4 VT M5.  Patient is opening eyes spontaneously and moving all 4 extremities.  He is not following commands.  Postoperative CT scan showed expected postoperative changes with no acute complications.  I explained the clinical examination and CT head findings to the patient's son over the telephone.  We will continue to monitor.  We will get MRI brain with and without contrast tomorrow a.m. patient can be extubated when appropriate as per the intensivist.    Davie Erickson MD

## 2025-04-23 NOTE — CONSULTS
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 Montemayor at [facility]. He received four cycles of carboplatin-etoposide, finishing on October 18, 2024. Follow up PET CT on December 23, 2024 showed a complete metabolic response with no evidence of residual disease.    Most recently, the patient presented to the ED on April 16, 2025 with lethargy, word finding difficulty, and generalized weakness. CT head and brain MRI from that date showed a 5.3 cm heterogeneously enhancing mass in the right temporal lobe with extensive vasogenic edema and a 5 mm midline shift. CT CAP from that date showed no evidence of extracranial progression. On April 22, 2025, the patient underwent craniotomy and debulking of the right temporal tumor. Pathology is pending, but preliminary results show morphologic features consistent with carcinoma with neuroendocrine differentiation. Post-operative MRI from today, April 23, 2025, shows interval debulking of the mass with 4.6 cm residual disease and improved midline shift.    SUBJECTIVE/REVIEW OF SYSTEMS:    The patient remains is intubated and unable to provide history. No family members were present with whom to discuss.    PAST MEDICAL HISTORY:  Past Medical History:   Diagnosis Date    Adhesive capsulitis of shoulder     Allergic rhinitis 01/07/2008    Problem list name updated by automated process. Provider to review      Anemia 03/10/2014    Anemia, unspecified     Antiplatelet or antithrombotic long-term use     Arrhythmia     Atrial fib/flutter    Atrial fibrillation, unspecified type (H)  05/02/2018    CAD (coronary artery disease)     Chemotherapy-induced neutropenia 07/19/2024    Coronary artery disease involving native coronary artery of native heart without angina pectoris 02/16/2025    Coronary atherosclerosis     Nuc Stress 4/15/16: On both stress and rest images there is a very small size mild intensity apical photopenic defect which is of equal extent and intensity on both stress and rest images. Gated images demonstrated no regional wall motion abnormalities. The left ventricular ejection fraction was calculated to be 61% with stress.      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    History of cardioversion 04/24/2018    a single synchronized shock of 120 joules restored normal sinus rhythm    History of cardioversion 02/13/2019    single shock , 200 joules successul in restoring NSR    History of colonic polyps 03/24/2017    Hyperlipidemia LDL goal <70 05/26/2011    Hypertension     Hypothyroidism     Hypothyroidism, unspecified type 08/15/2016    Impotence of organic origin     Laceration of finger 06/2010     left thumb s/p sutures    Numbness and tingling     diabetic neuropathy bilateral feet    BRANDON (obstructive sleep apnea)     intolerant of CPAP, uses it occasionally.  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 disease, with long-term current use of insulin (H) 02/16/2025    Type 2 diabetes mellitus without complication   (goal A1C<8) 10/24/2015    Typical atrial flutter (H) 04/18/2016    Unspecified hypothyroidism     Vitamin D deficiency 09/03/2011       PAST SURGICAL HISTORY:  Past Surgical History:   Procedure Laterality Date    ANESTHESIA CARDIOVERSION N/A 02/13/2019    Procedure: ANESTHESIA CARDIOVERSION;  Surgeon: GENERIC ANESTHESIA PROVIDER;  Location:  OR    ANESTHESIA CARDIOVERSION N/A 05/22/2019    Procedure: ANESTHESIA, FOR CARDIOVERSION;  Surgeon: GENERIC ANESTHESIA PROVIDER;  Location:  OR    CARDIAC SURGERY      bypass in 1997    CARDIOVERSION  04/24/2018    COLONOSCOPY      CORONARY ANGIOGRAPHY ADULT ORDER      4/2/2012    CORONARY ARTERY BYPASS  1997    [facility]    ENDOSCOPIC ULTRASOUND UPPER GASTROINTESTINAL TRACT (GI) N/A 03/14/2019    Procedure: ENDOSCOPIC ULTRASOUND OF UPPER GASTROINTESTINAL TRACT;  Surgeon: Ju Torres MD;  Location:  OR    ESOPHAGOSCOPY, GASTROSCOPY, DUODENOSCOPY (EGD), COMBINED N/A 4/15/2022    Procedure: ESOPHAGOGASTRODUODENOSCOPY (EGD);  Surgeon: Erik Baca DO;  Location:  GI    EXCISE MASS HEAD Left 08/18/2016    Procedure: EXCISE MASS HEAD;  Surgeon: Ivan Hernandez MD;  Location:  SD    HEART CATH, ANGIOPLASTY  04/2012    IR CHEST PORT PLACEMENT > 5 YRS OF AGE  8/9/2024    IR PORT REMOVAL RIGHT  2/17/2025    LAPAROSCOPIC CHOLECYSTECTOMY N/A 03/17/2019    Procedure: LAPAROSCOPIC CHOLECYSTECTOMY;  Surgeon: Janel Paz MD;  Location:  OR    PHACOEMULSIFICATION CLEAR CORNEA WITH STANDARD INTRAOCULAR LENS IMPLANT Left 06/08/2015    Procedure: PHACOEMULSIFICATION CLEAR CORNEA WITH STANDARD INTRAOCULAR LENS IMPLANT;  Surgeon: Nixon Farnsworth MD;  Location:  EC    PHACOEMULSIFICATION CLEAR CORNEA WITH STANDARD INTRAOCULAR LENS IMPLANT Right 06/22/2015    Procedure: PHACOEMULSIFICATION CLEAR CORNEA WITH STANDARD INTRAOCULAR LENS IMPLANT;  Surgeon: Nixon Farnsworth MD;  Location:  EC    SEPTOPLASTY, TURBINOPLASTY,  COMBINED Bilateral 08/18/2016    Procedure: COMBINED SEPTOPLASTY, TURBINOPLASTY;  Surgeon: Ivan Hernandez MD;  Location: [facility] NONSPECIFIC PROCEDURE  1997    CABG ([facility])    [facility] NONSPECIFIC PROCEDURE  08/2001    stress echo       MEDICATIONS:   Current Facility-Administered Medications   Medication Dose Route Frequency Provider Last Rate Last Admin    acetaminophen (TYLENOL) tablet 975 mg  975 mg Oral or Feeding Tube Q8H Nelly Coon NP        atorvastatin (LIPITOR) tablet 40 mg  40 mg Oral or Feeding Tube Daily Leydi Meyers MD        [START ON 4/25/2025] bisacodyl (DULCOLAX) suppository 10 mg  10 mg Rectal Daily PRN Nelly Coon NP        ceFAZolin (ANCEF) 1 g vial to attach to  ml bag for ADULT or 50 ml bag for PEDS  1 g Intravenous Q8H Nelly Coon NP   1 g at 04/23/25 0514    chlorhexidine (PERIDEX) 0.12 % solution 15 mL  15 mL Mouth/Throat Q12H Leydi Meyers MD   15 mL at 04/23/25 0923    dexAMETHasone PF (DECADRON) injection 8 mg  8 mg Intravenous Q12H Davie Erickson MD   8 mg at 04/23/25 0858    dextrose 10% infusion   Intravenous Continuous PRN Leydi Meyers MD        glucose gel 15-30 g  15-30 g Oral Q15 Min PRN Leydi Meyers MD        Or    dextrose 50 % injection 25-50 mL  25-50 mL Intravenous Q15 Min PRN Leydi Meyers MD        Or    glucagon injection 1 mg  1 mg Subcutaneous Q15 Min PRN Leydi Meyers MD        famotidine (PEPCID) tablet 20 mg  20 mg Oral or Feeding Tube Q24H Davie Erickson MD        Or    famotidine (PEPCID) injection 20 mg  20 mg Intravenous Q24H Davie Erickson MD   20 mg at 04/23/25 0857    hydrALAZINE (APRESOLINE) injection 10 mg  10 mg Intravenous Q30 Min PRN Nelly Coon NP        HYDROmorphone (PF) (DILAUDID) injection 0.3-0.5 mg  0.3-0.5 mg Intravenous Q2H PRN Leydi Meyers MD   0.3 mg at 04/23/25 0854    insulin aspart (NovoLOG) injection (RAPID ACTING)  1-12 Units Subcutaneous Q4H Leydi Meyers MD   6 Units  at 04/23/25 0910    [Held by provider] isosorbide mononitrate (IMDUR) 24 hr tablet 30 mg  30 mg Oral Daily Leydi Meyers MD        labetalol (NORMODYNE/TRANDATE) injection 10 mg  10 mg Intravenous Q10 Min PRN Nelly Coon NP        levETIRAcetam (KEPPRA) 1,500 mg in sodium chloride 0.9 % 125 mL intermittent infusion  1,500 mg Intravenous Q12H Kami Novak MD        levothyroxine (SYNTHROID/LEVOTHROID) tablet 125 mcg  125 mcg Oral or Feeding Tube Daily Leydi Meyers MD        lidocaine (LMX4) cream   Topical Q1H PRN Nelly Coon NP        lidocaine 1 % 0.1-1 mL  0.1-1 mL Other Q1H PRN Nelly Coon NP        LORazepam (ATIVAN) injection 2 mg  2 mg Intravenous Q5 Min PRN Fatou Brantley PA-C        [START ON 4/24/2025] magnesium hydroxide (MILK OF MAGNESIA) suspension 30 mL  30 mL Oral Daily PRN Nelly Coon NP        propofol (DIPRIVAN) infusion  5-75 mcg/kg/min Intravenous Continuous Leydi Meyers MD   Stopped at 04/22/25 1630    And    propofol (DIPRIVAN) bolus from bag or syringe pump  10 mg Intravenous Q15 Min PRN Leydi Meyers MD        And    Medication Instruction   Does not apply Continuous PRN Leydi Meyers MD        naloxone (NARCAN) injection 0.2 mg  0.2 mg Intravenous Q2 Min PRN Davie Erickson MD        Or    naloxone (NARCAN) injection 0.4 mg  0.4 mg Intravenous Q2 Min PRN Davie Erickson MD        Or    naloxone (NARCAN) injection 0.2 mg  0.2 mg Intramuscular Q2 Min PRN Davie Erickson MD        Or    naloxone (NARCAN) injection 0.4 mg  0.4 mg Intramuscular Q2 Min PRN Davie Erickson MD        norepinephrine (LEVOPHED) 4 mg in  mL PERIPHERAL infusion  0.01-0.2 mcg/kg/min Intravenous Continuous Lenore Guthrie NP   Stopped at 04/23/25 0630    ondansetron (ZOFRAN ODT) ODT tab 4 mg  4 mg Oral Q6H PRN Nelly Coon, NP        Or    ondansetron (ZOFRAN) injection 4 mg  4 mg Intravenous Q6H PRN Nelly Coon NP        oxyCODONE (ROXICODONE) tablet 5  mg  5 mg Oral Q4H PRN Nelly Coon NP        Or    oxyCODONE (ROXICODONE) tablet 10 mg  10 mg Oral Q4H PRN Nelly Coon NP        phentolamine in 0.9% NaCl (REGITINE) 5 mg/10 mL injection for extravsation treatment SOLN 5 mg  5 mg Subcutaneous Once PRN Davie Erickson MD        polyethylene glycol (MIRALAX) Packet 17 g  17 g Oral or Feeding Tube Daily Nelly Coon NP        prochlorperazine (COMPAZINE) injection 5 mg  5 mg Intravenous Q6H PRN Nelly Coon NP        Or    prochlorperazine (COMPAZINE) tablet 5 mg  5 mg Oral Q6H PRN Nelly Coon NP        senna-docusate (SENOKOT-S/PERICOLACE) 8.6-50 MG per tablet 1 tablet  1 tablet Oral or Feeding Tube BID Nelly Coon NP        sodium chloride (PF) 0.9% PF flush 3 mL  3 mL Intracatheter Q8H BOYD Nelly Coon NP   3 mL at 04/23/25 0517    sodium chloride (PF) 0.9% PF flush 3 mL  3 mL Intracatheter q1 min prn Nelly Coon NP           ALLERGIES:   Allergies   Allergen Reactions    Omeprazole      diarrhea    Pantoprazole      diarrhea       FAMILY HISTORY:   Family History   Problem Relation Age of Onset    Genitourinary Problems Father         d: age 89; ARF    Hypertension Father     Diabetes Mother         d: age 68, CAD    Heart Disease Mother         MI    Myocardial Infarction Mother     Cardiovascular Brother         d:  age 31; CAD    Heart Disease Brother         age 31, MI    Diabetes Sister         b:  1939; DM2    Diabetes Sister        SOCIAL HISTORY:   Social History     Socioeconomic History    Marital status:      Spouse name: Not on file    Number of children: Not on file    Years of education: Not on file    Highest education level: Not on file   Occupational History    Not on file   Tobacco Use    Smoking status: Former     Current packs/day: 0.00     Average packs/day: 1 pack/day for 6.5 years (6.5 ttl pk-yrs)     Types: Cigarettes, Cigars, Pipe     Start date: 1958     Quit date: 7/1/1964      Years since quittin.8    Smokeless tobacco: Never    Tobacco comments:     10/16/24 Pt states he will an occasional cigar.    Vaping Use    Vaping status: Never Used   Substance and Sexual Activity    Alcohol use: Not Currently     Comment: couple drinks a year    Drug use: No    Sexual activity: Yes     Partners: Female     Birth control/protection: Abstinence   Other Topics Concern    Parent/sibling w/ CABG, MI or angioplasty before 65F 55M? No     Service Not Asked    Blood Transfusions Not Asked    Caffeine Concern Yes     Comment: 2 cups coffee a day    Occupational Exposure Not Asked    Hobby Hazards Not Asked    Sleep Concern Yes     Comment: sleep apnea, wears cpap off and on    Stress Concern No    Weight Concern No    Special Diet Yes     Comment: diabetic diet     Back Care Not Asked    Exercise No     Comment: occ walk the mall    Bike Helmet Not Asked    Seat Belt Yes    Self-Exams Not Asked   Social History Narrative    Not on file     Social Drivers of Health     Financial Resource Strain: Unknown (2025)    Financial Resource Strain     Within the past 12 months, have you or your family members you live with been unable to get utilities (heat, electricity) when it was really needed?: Patient unable to answer   Food Insecurity: Unknown (2025)    Food Insecurity     Within the past 12 months, did you worry that your food would run out before you got money to buy more?: Patient unable to answer     Within the past 12 months, did the food you bought just not last and you didn t have money to get more?: Patient unable to answer   Transportation Needs: Unknown (2025)    Transportation Needs     Within the past 12 months, has lack of transportation kept you from medical appointments, getting your medicines, non-medical meetings or appointments, work, or from getting things that you need?: Patient unable to answer   Physical Activity: Insufficiently Active (2025)    Exercise  Vital Sign     Days of Exercise per Week: 7 days     Minutes of Exercise per Session: 20 min   Stress: No Stress Concern Present (2/9/2025)    [employer] [place] [employer] - Occupational Stress Questionnaire     Feeling of Stress : Not at all   Social Connections: Unknown (2/9/2025)    Social Connection and Isolation Panel [NHANES]     Frequency of Communication with Friends and Family: Not on file     Frequency of Social Gatherings with Friends and Family: Three times a week     Attends Confucianist Services: Not on file     Active Member of Clubs or Organizations: Not on file     Attends Club or Organization Meetings: Not on file     Marital Status: Not on file   Interpersonal Safety: Low Risk  (4/22/2025)    Interpersonal Safety     Do you feel physically and emotionally safe where you currently live?: Yes     Within the past 12 months, have you been hit, slapped, kicked or otherwise physically hurt by someone?: No     Within the past 12 months, have you been humiliated or emotionally abused in other ways by your partner or ex-partner?: No   Housing Stability: Unknown (4/22/2025)    Housing Stability     Do you have housing? : Patient unable to answer     Are you worried about losing your housing?: Patient unable to answer       CARDIAC IMPLANTABLE ELECTRONIC DEVICE: None    PRIOR RADIATION THERAPY: 6000 cGy in 30 fractions to left neck, completed October 21, 2024.    PHYSICAL EXAM:   General: Intubated, withdraws to pain, and opens eyes and moves extremities to voice.     IMAGING, LABORATORY STUDIES, AND PATHOLOGY:  Relevant studies reviewed as mentioned above in the HPI.        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 He was able to undergo debulking, but there remains significant residual disease, and he experienced a seizure this morning. He remains intubated and is off sedation but has been slow to become more alert.    Should he experience clinical improvement, I would recommend post-operative radiotherapy to the surgical cavity and residual disease. I would plan likely for stereotactic radiotherapy to 3000 cGy in 5 fractions. Alternatively, whole brain radiotherapy to 3000 cGy in 10 fractions could be pursued based on his small cell histology, but given the solitary nature of his disease, I would favor a stereotactic approach that would spare toxicities of whole brain treatment.     We will follow his clinical course and arrange for outpatient follow-up, treatment planning MRI, and CT simulation in 2-3 weeks pending his status with initiation of treatment approximately 1 week later.      I spent 25 minutes reviewing imaging, test results and other medical records, 5 minutes face to face with the patient.    Claudio Bravo MD  [facility] [place] [facility]

## 2025-04-23 NOTE — PROGRESS NOTES
Pt was transported to MRI on [facility], no issue during the transport.   4/23/2025  Venkat Cortez RT

## 2025-04-23 NOTE — PROGRESS NOTES
Per Laurie Sims from neurosurgery, ok to remove dressing on craniotomy site for EEG. SUSI to thumbprint. Orders to be updated by NP

## 2025-04-23 NOTE — PROGRESS NOTES
Critical access hospital ICU RESPIRATORY NOTE        Date of Admission: 4/22/2025    Date of Intubation (most recent): 4/22/2025    Reason for Mechanical Ventilation: Cardiac arrest    Number of Days on Mechanical Ventilation: 2    Met Criteria for Spontaneous Breathing Trial: No    Reason for No Spontaneous Breathing Trial: Per MD    Bite Block: No    Significant Events Today: None    ABG Results:   Recent Labs   Lab 04/22/25  1449 04/22/25  1349 04/22/25  1332   PH 7.38  --  7.03*   PCO2 35  --  46*   PO2 154*  --  486*   HCO3 21  --  12*   O2PER 30 0  --          Current Vent Settings: FiO2 (%): 30 %, Resp: 16, Vent Mode: VC/AC, Resp Rate (Set): 16 breaths/min, Tidal Volume (Set, mL): 550 mL, PEEP (cm H2O): 5 cmH2O, Resp Rate (Set): 16 breaths/min, Tidal Volume (Set, mL): 550 mL, PEEP (cm H2O): 5 cmH2O    Skin Assessment: Intact    Plan: Will cont full vent support for now and will assess for weaning readiness daily.    Maria T Dee, RT on 4/23/2025 at 6:07 PM]

## 2025-04-23 NOTE — PROGRESS NOTES
[facility] ICU RESPIRATORY NOTE        Date of Admission: 4/22/2025    Date of Intubation (most recent): 4/22/2025    Reason for Mechanical Ventilation: Cardia arrest    Number of Days on Mechanical Ventilation: 2    Met Criteria for Spontaneous Breathing Trial: No    Reason for No Spontaneous Breathing Trial: per MD     Bite Block: No    Significant Events Today: Currently, pt was taken to Head MRI     ABG Results:   Recent Labs   Lab 04/22/25  1449 04/22/25  1349 04/22/25  1332   PH 7.38  --  7.03*   PCO2 35  --  46*   PO2 154*  --  486*   HCO3 21  --  12*   O2PER 30 0  --          Current Vent Settings: FiO2 (%): 30 %, Resp: 16, Vent Mode: VC/AC, Resp Rate (Set): 16 breaths/min, Tidal Volume (Set, mL): 550 mL, PEEP (cm H2O): 5 cmH2O, Resp Rate (Set): 16 breaths/min, Tidal Volume (Set, mL): 550 mL, PEEP (cm H2O): 5 cmH2O    Skin Assessment: Intact     Plan: Continue on full ventilator support for now per MD. Cuco Ross, RT on 4/23/2025 at 4:44 AM

## 2025-04-23 NOTE — PROGRESS NOTES
[facility]  Neurosurgery Daily Progress Note  Date of Admission:  4/22/2025    Assessment  -History of high grade neuroendocrine carcinoma   -Procedure: RIGHT FRONTO-TEMPORAL CRANIOTOMY, USING OPTICAL TRACKING SYSTEM, WITH NEOPLASM EXCISION   1 Day Post-Op  Final surgical pathology in process    Interval History   Neurology consulted for post op seizures. Patient was seen in ICU, intubated, off sedation, opens eyes to voice, withdraws to pain, incision CDI with dressing.     Post op brain MRI reviewed with Dr. Erickson   Drain with 100ml output overnight  Hemoglobin 10.5    Plan   -ICU  -Neurology consulted for post op seizure management, okay to remove dressing for EEG  -Decadron 8mg BID  -SUSI drain to thumbprint suction, monitor and record output   -Abx while drain is in place   -Routine wound care   -Monitor neuro exam   -Radiation Oncology consult  -Appreciate assistance from specialties     Discussed with Dr. Erickson and nurse     Nelly Coon, CNP  [facility] Neurosurgery  Clinic phone number: [phone]  Securely message or page via Epic Secure Chat, [employer], or [employer]     Physical Exam   Temp: 98  F (36.7  C) Temp src: Axillary BP: 130/64 Pulse: 87   Resp: 16 SpO2: 100 % O2 Device: Mechanical Ventilator    Vitals:    04/22/25 0617 04/22/25 1430 04/23/25 0342   Weight: 83.8 kg (184 lb 11.2 oz) 82.2 kg (181 lb 3.5 oz) 81.3 kg (179 lb 3.7 oz)       Patient was seen in ICU  Intubated, off sedation  Opens eyes to voice  PERRL  Withdraws to pain in all 4 extremities   Incision: CDI with dressing  Drain: Intact

## 2025-04-24 ENCOUNTER — APPOINTMENT (OUTPATIENT)
Dept: PHYSICAL THERAPY | Facility: CLINIC | Age: 85
DRG: 025 | End: 2025-04-24
Attending: NURSE PRACTITIONER
Payer: COMMERCIAL

## 2025-04-24 ENCOUNTER — HOSPITAL ENCOUNTER (INPATIENT)
Dept: NEUROLOGY | Facility: CLINIC | Age: 85
Discharge: HOME | DRG: 025 | End: 2025-04-24
Attending: STUDENT IN AN ORGANIZED HEALTH CARE EDUCATION/TRAINING PROGRAM | Admitting: NEUROLOGICAL SURGERY
Payer: COMMERCIAL

## 2025-04-24 LAB
ANION GAP SERPL CALCULATED.3IONS-SCNC: 11 MMOL/L (ref 7–15)
ATRIAL RATE - MUSE: 57 BPM
ATRIAL RATE - MUSE: 65 BPM
ATRIAL RATE - MUSE: 75 BPM
BUN SERPL-MCNC: 28.2 MG/DL (ref 8–23)
CA-I BLD-MCNC: 4.8 MG/DL (ref 4.4–5.2)
CALCIUM SERPL-MCNC: 8.6 MG/DL (ref 8.8–10.4)
CHLORIDE SERPL-SCNC: 108 MMOL/L (ref 98–107)
CREAT SERPL-MCNC: 1.04 MG/DL (ref 0.67–1.17)
DIASTOLIC BLOOD PRESSURE - MUSE: NORMAL MMHG
EGFRCR SERPLBLD CKD-EPI 2021: 71 ML/MIN/1.73M2
ERYTHROCYTE [DISTWIDTH] IN BLOOD BY AUTOMATED COUNT: 14.8 % (ref 10–15)
GLUCOSE BLDC GLUCOMTR-MCNC: 113 MG/DL (ref 70–99)
GLUCOSE BLDC GLUCOMTR-MCNC: 124 MG/DL (ref 70–99)
GLUCOSE BLDC GLUCOMTR-MCNC: 127 MG/DL (ref 70–99)
GLUCOSE BLDC GLUCOMTR-MCNC: 127 MG/DL (ref 70–99)
GLUCOSE BLDC GLUCOMTR-MCNC: 129 MG/DL (ref 70–99)
GLUCOSE BLDC GLUCOMTR-MCNC: 135 MG/DL (ref 70–99)
GLUCOSE BLDC GLUCOMTR-MCNC: 138 MG/DL (ref 70–99)
GLUCOSE BLDC GLUCOMTR-MCNC: 143 MG/DL (ref 70–99)
GLUCOSE BLDC GLUCOMTR-MCNC: 144 MG/DL (ref 70–99)
GLUCOSE BLDC GLUCOMTR-MCNC: 150 MG/DL (ref 70–99)
GLUCOSE BLDC GLUCOMTR-MCNC: 157 MG/DL (ref 70–99)
GLUCOSE BLDC GLUCOMTR-MCNC: 160 MG/DL (ref 70–99)
GLUCOSE BLDC GLUCOMTR-MCNC: 174 MG/DL (ref 70–99)
GLUCOSE SERPL-MCNC: 168 MG/DL (ref 70–99)
HCO3 SERPL-SCNC: 20 MMOL/L (ref 22–29)
HCT VFR BLD AUTO: 28 % (ref 40–53)
HGB BLD-MCNC: 9.4 G/DL (ref 13.3–17.7)
INTERPRETATION ECG - MUSE: NORMAL
MAGNESIUM SERPL-MCNC: 1.9 MG/DL (ref 1.7–2.3)
MCH RBC QN AUTO: 30.5 PG (ref 26.5–33)
MCHC RBC AUTO-ENTMCNC: 33.6 G/DL (ref 31.5–36.5)
MCV RBC AUTO: 91 FL (ref 78–100)
P AXIS - MUSE: NORMAL DEGREES
PATH REPORT.COMMENTS IMP SPEC: ABNORMAL
PATH REPORT.COMMENTS IMP SPEC: ABNORMAL
PATH REPORT.COMMENTS IMP SPEC: YES
PATH REPORT.FINAL DX SPEC: ABNORMAL
PATH REPORT.GROSS SPEC: ABNORMAL
PATH REPORT.INTRAOP OBS SPEC DOC: ABNORMAL
PATH REPORT.MICROSCOPIC SPEC OTHER STN: ABNORMAL
PATH REPORT.RELEVANT HX SPEC: ABNORMAL
PHOSPHATE SERPL-MCNC: 2.9 MG/DL (ref 2.5–4.5)
PHOTO IMAGE: ABNORMAL
PLATELET # BLD AUTO: 91 10E3/UL (ref 150–450)
POTASSIUM SERPL-SCNC: 3.8 MMOL/L (ref 3.4–5.3)
PR INTERVAL - MUSE: 352 MS
PR INTERVAL - MUSE: 488 MS
PR INTERVAL - MUSE: NORMAL MS
QRS DURATION - MUSE: 128 MS
QRS DURATION - MUSE: 132 MS
QRS DURATION - MUSE: 144 MS
QT - MUSE: 424 MS
QT - MUSE: 496 MS
QT - MUSE: 534 MS
QTC - MUSE: 515 MS
QTC - MUSE: 519 MS
QTC - MUSE: 527 MS
R AXIS - MUSE: 52 DEGREES
R AXIS - MUSE: 72 DEGREES
R AXIS - MUSE: 78 DEGREES
RBC # BLD AUTO: 3.08 10E6/UL (ref 4.4–5.9)
SODIUM SERPL-SCNC: 139 MMOL/L (ref 135–145)
SYSTOLIC BLOOD PRESSURE - MUSE: NORMAL MMHG
T AXIS - MUSE: 211 DEGREES
T AXIS - MUSE: 232 DEGREES
T AXIS - MUSE: 238 DEGREES
VENTRICULAR RATE- MUSE: 57 BPM
VENTRICULAR RATE- MUSE: 65 BPM
VENTRICULAR RATE- MUSE: 93 BPM
WBC # BLD AUTO: 8.2 10E3/UL (ref 4–11)

## 2025-04-24 PROCEDURE — 88331 PATH CONSLTJ SURG 1 BLK 1SPC: CPT | Mod: 26 | Performed by: PATHOLOGY

## 2025-04-24 PROCEDURE — 88341 IMHCHEM/IMCYTCHM EA ADD ANTB: CPT | Mod: 26 | Performed by: PATHOLOGY

## 2025-04-24 PROCEDURE — 88307 TISSUE EXAM BY PATHOLOGIST: CPT | Mod: 26 | Performed by: PATHOLOGY

## 2025-04-24 PROCEDURE — 82330 ASSAY OF CALCIUM: CPT | Performed by: STUDENT IN AN ORGANIZED HEALTH CARE EDUCATION/TRAINING PROGRAM

## 2025-04-24 PROCEDURE — 88342 IMHCHEM/IMCYTCHM 1ST ANTB: CPT | Mod: 26 | Performed by: PATHOLOGY

## 2025-04-24 PROCEDURE — 999N000253 HC STATISTIC WEANING TRIALS

## 2025-04-24 PROCEDURE — 200N000001 HC R&B ICU

## 2025-04-24 PROCEDURE — 250N000013 HC RX MED GY IP 250 OP 250 PS 637: Performed by: STUDENT IN AN ORGANIZED HEALTH CARE EDUCATION/TRAINING PROGRAM

## 2025-04-24 PROCEDURE — 250N000013 HC RX MED GY IP 250 OP 250 PS 637: Performed by: NURSE PRACTITIONER

## 2025-04-24 PROCEDURE — 250N000011 HC RX IP 250 OP 636: Performed by: NEUROLOGICAL SURGERY

## 2025-04-24 PROCEDURE — 99291 CRITICAL CARE FIRST HOUR: CPT | Mod: 24

## 2025-04-24 PROCEDURE — 95720 EEG PHY/QHP EA INCR W/VEEG: CPT | Performed by: PSYCHIATRY & NEUROLOGY

## 2025-04-24 PROCEDURE — 83735 ASSAY OF MAGNESIUM: CPT | Performed by: STUDENT IN AN ORGANIZED HEALTH CARE EDUCATION/TRAINING PROGRAM

## 2025-04-24 PROCEDURE — 250N000011 HC RX IP 250 OP 636: Performed by: NURSE PRACTITIONER

## 2025-04-24 PROCEDURE — 258N000003 HC RX IP 258 OP 636: Performed by: STUDENT IN AN ORGANIZED HEALTH CARE EDUCATION/TRAINING PROGRAM

## 2025-04-24 PROCEDURE — 97530 THERAPEUTIC ACTIVITIES: CPT | Mod: GP

## 2025-04-24 PROCEDURE — 97161 PT EVAL LOW COMPLEX 20 MIN: CPT | Mod: GP

## 2025-04-24 PROCEDURE — 85027 COMPLETE CBC AUTOMATED: CPT | Performed by: STUDENT IN AN ORGANIZED HEALTH CARE EDUCATION/TRAINING PROGRAM

## 2025-04-24 PROCEDURE — 250N000011 HC RX IP 250 OP 636: Mod: JZ | Performed by: STUDENT IN AN ORGANIZED HEALTH CARE EDUCATION/TRAINING PROGRAM

## 2025-04-24 PROCEDURE — 94003 VENT MGMT INPAT SUBQ DAY: CPT

## 2025-04-24 PROCEDURE — 999N000157 HC STATISTIC RCP TIME EA 10 MIN

## 2025-04-24 PROCEDURE — 99291 CRITICAL CARE FIRST HOUR: CPT | Mod: 24 | Performed by: ANESTHESIOLOGY

## 2025-04-24 PROCEDURE — 84100 ASSAY OF PHOSPHORUS: CPT | Performed by: STUDENT IN AN ORGANIZED HEALTH CARE EDUCATION/TRAINING PROGRAM

## 2025-04-24 PROCEDURE — 250N000013 HC RX MED GY IP 250 OP 250 PS 637: Performed by: NEUROLOGICAL SURGERY

## 2025-04-24 PROCEDURE — 95714 VEEG EA 12-26 HR UNMNTR: CPT

## 2025-04-24 PROCEDURE — 82310 ASSAY OF CALCIUM: CPT | Performed by: STUDENT IN AN ORGANIZED HEALTH CARE EDUCATION/TRAINING PROGRAM

## 2025-04-24 RX ORDER — DEXAMETHASONE SODIUM PHOSPHATE 10 MG/ML
6 INJECTION, SOLUTION INTRAMUSCULAR; INTRAVENOUS EVERY 12 HOURS
Status: COMPLETED | OUTPATIENT
Start: 2025-04-24 | End: 2025-04-26

## 2025-04-24 RX ORDER — DEXAMETHASONE SODIUM PHOSPHATE 4 MG/ML
1 INJECTION, SOLUTION INTRA-ARTICULAR; INTRALESIONAL; INTRAMUSCULAR; INTRAVENOUS; SOFT TISSUE DAILY
Status: DISCONTINUED | OUTPATIENT
Start: 2025-05-04 | End: 2025-05-01

## 2025-04-24 RX ORDER — DEXAMETHASONE SODIUM PHOSPHATE 4 MG/ML
4 INJECTION, SOLUTION INTRA-ARTICULAR; INTRALESIONAL; INTRAMUSCULAR; INTRAVENOUS; SOFT TISSUE DAILY
Status: COMPLETED | OUTPATIENT
Start: 2025-04-28 | End: 2025-04-30

## 2025-04-24 RX ORDER — DEXAMETHASONE SODIUM PHOSPHATE 4 MG/ML
4 INJECTION, SOLUTION INTRA-ARTICULAR; INTRALESIONAL; INTRAMUSCULAR; INTRAVENOUS; SOFT TISSUE EVERY 12 HOURS
Status: COMPLETED | OUTPATIENT
Start: 2025-04-26 | End: 2025-04-28

## 2025-04-24 RX ORDER — DEXAMETHASONE SODIUM PHOSPHATE 4 MG/ML
2 INJECTION, SOLUTION INTRA-ARTICULAR; INTRALESIONAL; INTRAMUSCULAR; INTRAVENOUS; SOFT TISSUE DAILY
Status: DISCONTINUED | OUTPATIENT
Start: 2025-05-01 | End: 2025-05-01

## 2025-04-24 RX ADMIN — HYDROMORPHONE HYDROCHLORIDE 0.5 MG: 1 INJECTION, SOLUTION INTRAMUSCULAR; INTRAVENOUS; SUBCUTANEOUS at 20:53

## 2025-04-24 RX ADMIN — LEVOTHYROXINE SODIUM 125 MCG: 125 TABLET ORAL at 07:54

## 2025-04-24 RX ADMIN — CHLORHEXIDINE GLUCONATE 15 ML: 1.2 SOLUTION ORAL at 07:54

## 2025-04-24 RX ADMIN — HYDROMORPHONE HYDROCHLORIDE 0.5 MG: 1 INJECTION, SOLUTION INTRAMUSCULAR; INTRAVENOUS; SUBCUTANEOUS at 10:42

## 2025-04-24 RX ADMIN — CHLORHEXIDINE GLUCONATE 15 ML: 1.2 SOLUTION ORAL at 20:53

## 2025-04-24 RX ADMIN — ATORVASTATIN CALCIUM 40 MG: 40 TABLET, FILM COATED ORAL at 07:55

## 2025-04-24 RX ADMIN — HYDROMORPHONE HYDROCHLORIDE 0.5 MG: 1 INJECTION, SOLUTION INTRAMUSCULAR; INTRAVENOUS; SUBCUTANEOUS at 07:44

## 2025-04-24 RX ADMIN — ACETAMINOPHEN 975 MG: 325 TABLET, FILM COATED ORAL at 16:07

## 2025-04-24 RX ADMIN — FAMOTIDINE 20 MG: 20 TABLET, FILM COATED ORAL at 07:54

## 2025-04-24 RX ADMIN — LEVETIRACETAM 1500 MG: 100 INJECTION, SOLUTION INTRAVENOUS at 02:29

## 2025-04-24 RX ADMIN — ACETAMINOPHEN 975 MG: 325 TABLET, FILM COATED ORAL at 06:28

## 2025-04-24 RX ADMIN — DEXAMETHASONE SODIUM PHOSPHATE 6 MG: 10 INJECTION, SOLUTION INTRAMUSCULAR; INTRAVENOUS at 20:53

## 2025-04-24 RX ADMIN — CEFAZOLIN 1 G: 1 INJECTION, POWDER, FOR SOLUTION INTRAMUSCULAR; INTRAVENOUS at 03:55

## 2025-04-24 RX ADMIN — DEXAMETHASONE SODIUM PHOSPHATE 8 MG: 10 INJECTION, SOLUTION INTRAMUSCULAR; INTRAVENOUS at 07:55

## 2025-04-24 RX ADMIN — LEVETIRACETAM 1500 MG: 100 INJECTION, SOLUTION INTRAVENOUS at 16:06

## 2025-04-24 RX ADMIN — ACETAMINOPHEN 975 MG: 325 TABLET, FILM COATED ORAL at 23:44

## 2025-04-24 RX ADMIN — HYDROMORPHONE HYDROCHLORIDE 0.5 MG: 1 INJECTION, SOLUTION INTRAMUSCULAR; INTRAVENOUS; SUBCUTANEOUS at 13:45

## 2025-04-24 ASSESSMENT — ACTIVITIES OF DAILY LIVING (ADL)
ADLS_ACUITY_SCORE: 56
ADLS_ACUITY_SCORE: 57
ADLS_ACUITY_SCORE: 54
ADLS_ACUITY_SCORE: 57
ADLS_ACUITY_SCORE: 54
ADLS_ACUITY_SCORE: 57
ADLS_ACUITY_SCORE: 57
ADLS_ACUITY_SCORE: 54
ADLS_ACUITY_SCORE: 56
ADLS_ACUITY_SCORE: 58
ADLS_ACUITY_SCORE: 55
ADLS_ACUITY_SCORE: 55
ADLS_ACUITY_SCORE: 58
ADLS_ACUITY_SCORE: 55
ADLS_ACUITY_SCORE: 57
ADLS_ACUITY_SCORE: 57
ADLS_ACUITY_SCORE: 54
ADLS_ACUITY_SCORE: 59
ADLS_ACUITY_SCORE: 54
ADLS_ACUITY_SCORE: 54

## 2025-04-24 NOTE — PLAN OF CARE
Goal Outcome Evaluation:      Plan of Care Reviewed With: patient    Overall Patient Progress: no changeOverall Patient Progress: no change    Outcome Evaluation: Resumed care of patient at [id number]. Patient does not follow commands. Can move all extremities. Intermittently opens eyes and becomes restless with stimulation. Patient remains intubated without sedation. EEG monitoring.      Problem: Delirium  Goal: Optimal Coping  Outcome: Not Progressing  Goal: Improved Behavioral Control  Outcome: Not Progressing  Goal: Improved Attention and Thought Clarity  Outcome: Not Progressing  Goal: Improved Sleep  Outcome: Not Progressing     Problem: Adult Inpatient Plan of Care  Goal: Plan of Care Review  Description: The Plan of Care Review/Shift note should be completed every shift.  The Outcome Evaluation is a brief statement about your assessment that the patient is improving, declining, or no change.  This information will be displayed automatically on your shiftnote.  Outcome: Not Progressing  Flowsheets (Taken 4/24/2025 0640)  Outcome Evaluation: Resumed care of patient at [id number]. Patient does not follow commands. Can move all extremities. Intermittently opens eyes and becomes restless with stimulation. Patient remains intubated without sedation. EEG monitoring.  Plan of Care Reviewed With: patient  Overall Patient Progress: no change     Problem: Seizure, Active Management  Goal: Absence of Seizure/Seizure-Related Injury  Outcome: Progressing

## 2025-04-24 NOTE — PROGRESS NOTES
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 He had a seizure and cardiopulmonary arrest in PACU requiring re-intubation and CPR with ROSC. The patient was admitted to the ICU for medical management.     I have personally reviewed the daily labs, imaging studies, cultures and discussed the case with referring physician and consulting physicians.     Neuro:  Right temporal brain tumor s/p craniotomy  Cerebral vasogenic edema with midline shift  History of neuroendocrine tumor s/p chemoradiation  Post-operative seizure  - Neurosurgery and neurology following, appreciate recommendations  - Propofol for sedation  - Multimodal pain control  - Continue Keppra for seizures  - Steroid taper per neurosurgery  - Seizure precautions and neuro checks  - EEG ongoing  - SUSI drain per neurosurgery     CV:  Cardiopulmonary arrest  Possible 1st degree AV block  History of atrial fibrillation on chronic anticoagulation  History of CAD s/p CABG  Hyperlipidemia  - MAP goal > 65, currently requiring intermittent levophed  - Holding anticoagulation, PTA Imdur  - Continue PTA statin     Pulm:  Respiratory arrest  - Wean PEEP/FiO2, as able  - Maintain intubation until neurological status is determined for airway protection     GI:  Risk for calorie malnutrition  - RD to manage TF  - PPI prophylaxis     Renal:  Chronic kidney disease (baseline creatinine 1.1-1.3)  -  "Monitor UOP, Cr, I/O  - Avoid nephrotoxic agents     ID:  No active issues      Heme:  Acute blood loss anemia  - Daily CBC ordered  - Transfuse hemoglobin <7     Endo:  Type 2 diabetes mellitus (A1c 6.9%)  Hypothyroidism  - Insulin drip, consider long acting pending steroid taper  - Goal glucose <180  - PTA levothyroxine reordered    Clinically Significant Risk Factors          # Hyperchloremia: Highest Cl = 108 mmol/L in last 2 days, will monitor as appropriate      # Hypocalcemia: Lowest Ca = 8.6 mg/dL in last 2 days, will monitor and replace as appropriate    # Anion Gap Metabolic Acidosis: Highest Anion Gap = 30 mmol/L in last 2 days, will monitor and treat as appropriate  # Hypoalbuminemia: Lowest albumin = 3.3 g/dL at 4/23/2025  5:31 PM, will monitor as appropriate    # Coagulation Defect: INR = 1.21 (Ref range: 0.85 - 1.15) and/or PTT = 21 Seconds (Ref range: 22 - 38 Seconds), will monitor for bleeding  # Thrombocytopenia: Lowest platelets = 91 in last 2 days, will monitor for bleeding   # Hypertension: Noted on problem list           # DMII: A1C = 7.9 % (Ref range: <5.7 %) within past 6 months   # Overweight: Estimated body mass index is 25 kg/m  as calculated from the following:    Height as of this encounter: 1.803 m (5' 11\").    Weight as of this encounter: 81.3 kg (179 lb 3.7 oz)., PRESENT ON ADMISSION       # Financial/Environmental Concerns:                PPx:  1. DVT: mechanical only   2. VAP: HOB 30 degrees, chlorhexidine rinse  3. Stress Ulcer: PPI  4. Restraints: Nonviolent soft two point restraints required and necessary for patient safety and continued cares and good effect as patient continues to pull at necessary lines, tubes despite education and distraction. Will readdress daily.   5. Wound care - per unit routine   6. Feeding - TF  7. Family to be updated via phone    Unable to obtain ROS 2/2 sedation/intubation.     /71   Pulse 80   Temp 98.3  F (36.8  C) (Axillary)   Resp 16   " "Ht 1.803 m (5' 11\")   Wt 81.3 kg (179 lb 3.7 oz)   SpO2 100%   BMI 25.00 kg/m    Gen: supine, NAD  Neuro: follows simple commands, opens eyes, moves all extremities  HEENT: anicteric  Card: RRR  Pulm: clear b/l  Abd: soft, non-distended  MSK: no edema, no acute joint abnormality  Skin: no obvious rash    FiO2 (%): 30 %, Resp: 16, Vent Mode: VC/AC, Resp Rate (Set): 16 breaths/min, Tidal Volume (Set, mL): 550 mL, PEEP (cm H2O): 5 cmH2O, Resp Rate (Set): 16 breaths/min, Tidal Volume (Set, mL): 550 mL, PEEP (cm H2O): 5 cmH2O    Intake/Output Summary (Last 24 hours) at 4/24/2025 0736  Last data filed at 4/24/2025 0600  Gross per 24 hour   Intake 2652.51 ml   Output 1748 ml   Net 904.51 ml     CBC RESULTS:   Recent Labs   Lab Test 04/24/25  0517   WBC 8.2   RBC 3.08*   HGB 9.4*   HCT 28.0*   MCV 91   MCH 30.5   MCHC 33.6   RDW 14.8   PLT 91*     Lab Results   Component Value Date     04/24/2025    POTASSIUM 3.8 04/24/2025    CHLORIDE 108 (H) 04/24/2025    CO2 20 (L) 04/24/2025    ANIONGAP 11 04/24/2025     (H) 04/24/2025    BUN 28.2 (H) 04/24/2025    CR 1.04 04/24/2025    GFRESTIMATED 71 04/24/2025    STARR 8.6 (L) 04/24/2025       Discussed with staff, Dr. Anne.    Leydi Meyers MD  Surgical Critical Care Fellow    "

## 2025-04-24 NOTE — PROGRESS NOTES
04/24/25 6964   Appointment Info   Signing Clinician's Name / Credentials (PT) Leslie Dykes, PT, DPT   Living Environment   People in Home child(sharita), adult   Current Living Arrangements apartment   Home Accessibility no concerns   Transportation Anticipated family or friend will provide   Self-Care   Usual Activity Tolerance good   Current Activity Tolerance poor   Equipment Currently Used at Home cane, straight   Fall history within last six months no   Activity/Exercise/Self-Care Comment subjective per chart. pt unable to state at this time. Per chart, pt is independent at baseline, lives with son. Uses SPC for all mobility   General Information   Onset of Illness/Injury or Date of Surgery 04/23/25   Referring Physician Nelly oCon, NP   Patient/Family Therapy Goals Statement (PT) pt unable to state   Pertinent History of Current Problem (include personal factors and/or comorbidities that impact the POC) POD 2 1. Right temporal brain tumor likely metastatic tumor.    2. Cerebral edema with mass effect.   Existing Precautions/Restrictions fall   Weight-Bearing Status - LLE full weight-bearing   Weight-Bearing Status - RLE full weight-bearing   Cognition   Affect/Mental Status (Cognition) unable/difficult to assess   Cognitive Status Comments pt able to follow very basic commands, vented and unable to speak   Pain Assessment   Patient Currently in Pain   (no overt indications of pain)   Integumentary/Edema   Integumentary/Edema no deficits were identifed   Posture    Posture Comments affected by decreased trunk strength   Range of Motion (ROM)   Range of Motion ROM deficits secondary to weakness   Strength (Manual Muscle Testing)   Strength (Manual Muscle Testing) Deficits observed during functional mobility   Strength Comments can wiggle toes, squeeze hands. unable to lift LE against gravity   Bed Mobility   Comment, (Bed Mobility) dependent   Transfers   Comment, (Transfers) dependent with dodie lift    Gait/Stairs (Locomotion)   Comment, (Gait/Stairs) unable d/t weakness/lethargy   Clinical Impression   Criteria for Skilled Therapeutic Intervention Yes, treatment indicated   PT Diagnosis (PT) impaired functional mobility   Influenced by the following impairments decreased strength, balance, and activity tolerance   Functional limitations due to impairments bed mobility, transfers, gait   Clinical Presentation (PT Evaluation Complexity) evolving   Clinical Presentation Rationale clinical judgement   Clinical Decision Making (Complexity) moderate complexity   Planned Therapy Interventions (PT) balance training;bed mobility training;gait training;home exercise program;neuromuscular re-education;patient/family education;stair training;strengthening;transfer training   Risk & Benefits of therapy have been explained evaluation/treatment results reviewed;care plan/treatment goals reviewed;risks/benefits reviewed;current/potential barriers reviewed;participants voiced agreement with care plan;participants included;patient   PT Total Evaluation Time   PT Eval, Low Complexity Minutes (73992) 12   Physical Therapy Goals   PT Frequency 6x/week   PT Predicted Duration/Target Date for Goal Attainment 05/01/25   PT Goals Bed Mobility;Transfers;Gait   PT: Bed Mobility Modified independent   PT: Transfers Supervision/stand-by assist;Sit to/from stand;Bed to/from chair   PT: Gait Supervision/stand-by assist;Assistive device;100 feet   Interventions   Interventions Quick Adds Therapeutic Activity   Therapeutic Activity   Therapeutic Activities: dynamic activities to improve functional performance Minutes (98232) 45   Symptoms Noted During/After Treatment Fatigue   Treatment Detail/Skilled Intervention vented, FIO2 30%, PEEP of 5. Pt in supine, eyes closed but wakes easily. Follows basic cuing, wiggling toes, squeeze hands. Restless at first, restraints on now but tends to reach for tube quickly when restraints off per RN. Una  provided by RN beginning of session, which made pt more lethargic. Worked on following target with eyes, following commands with hand squeeze, attempted to lift limbs but only total assist. Set up with increased time for lines/tubes (vent, EEG, IVs/tube feed, catheter, vitals) with flat dodie sheet and hoyered to flat commbilizer. With increased time to adjust to upright, sat up in chair. Vitals stable, pt appears to tolerate well. Restraints re-donned.   PT Discharge Planning   PT Plan seated EOB when appropriate   PT Discharge Recommendation (DC Rec) Transitional Care Facility   PT Rationale for DC Rec Patient presents with significantly decreased functional mobility compared to baseline primarily affected by global weakness, lethargy, O2 needs. Anticipated to need further rehab to address safety and independence prior to returning home. Pt has support of son who he lives with.   PT Brief overview of current status dodie lift  (Goals of therapy will be to address safe mobility and make recs for d/c to next level of care. Pt and RN will continue to follow all falls risk precautions as documented by RN staff while hospitalized.)   PT Total Distance Amb During Session (feet) 0   Physical Therapy Time and Intention   Timed Code Treatment Minutes 45   Total Session Time (sum of timed and untimed services) 57

## 2025-04-24 NOTE — PROGRESS NOTES
[facility]  Neurosurgery Daily Progress Note  Date of Admission:  4/22/2025    Assessment  -History of high grade neuroendocrine carcinoma   -Procedure: RIGHT FRONTO-TEMPORAL CRANIOTOMY, USING OPTICAL TRACKING SYSTEM, WITH NEOPLASM EXCISION   2 Days Post-Op  Final surgical pathology in process  Vasogenic edema     Interval History   Patient was seen in ICU, intubated, off sedation, opens eyes to voice, PERRL, following simple commands, moves all extremities, incision CDI with sutures.     Post op brain MRI reviewed with Dr. Erickson   Drain with 13ml output overnight  Hemoglobin 9.4    Plan   -Drain removed, site closed with suture   -Decadron taper x 2 weeks   -Routine wound care   -Monitor neuro exam   -Neurology following for post op seizure management  -Radiation Oncology following   -Appreciate assistance from specialties     Discussed with Dr. Dre Coon, CNP  [facility] Neurosurgery  Clinic phone number: [phone]  Securely message or page via [employer] Secure Chat, [employer], or [employer]     Physical Exam   Temp: 98.7  F (37.1  C) Temp src: Axillary BP: 106/59 Pulse: 60     SpO2: 100 % O2 Device: Mechanical Ventilator    Vitals:    04/22/25 0617 04/22/25 1430 04/23/25 0342   Weight: 83.8 kg (184 lb 11.2 oz) 82.2 kg (181 lb 3.5 oz) 81.3 kg (179 lb 3.7 oz)       Patient was seen in ICU  Intubated, off sedation  Opens eyes to voice  PERRL  Following simple commands  Able to move all extremities   Incision: CDI with sutures

## 2025-04-24 NOTE — PROGRESS NOTES
[facility] ICU RESPIRATORY NOTE        Date of Admission: 4/22/2025    Date of Intubation (most recent): 4/22/2025    Reason for Mechanical Ventilation: Cardiac arrest     Number of Days on Mechanical Ventilation: 3    Met Criteria for Spontaneous Breathing Trial: No    Reason for No Spontaneous Breathing Trial: per MD     Bite Block: No    Significant Events Today: Pt was taken to CT     ABG Results:   Recent Labs   Lab 04/22/25  1449 04/22/25  1349 04/22/25  1332   PH 7.38  --  7.03*   PCO2 35  --  46*   PO2 154*  --  486*   HCO3 21  --  12*   O2PER 30 0  --          Current Vent Settings: FiO2 (%): 30 %, Resp: 16, Vent Mode: VC/AC, Resp Rate (Set): 16 breaths/min, Tidal Volume (Set, mL): 550 mL, PEEP (cm H2O): 5 cmH2O, Resp Rate (Set): 16 breaths/min, Tidal Volume (Set, mL): 550 mL, PEEP (cm H2O): 5 cmH2O    Skin Assessment: Intact     Plan: Pt continue on full ventilator support and will assess daily for weaning readiness.     Cuco Ross RT on 4/24/2025 at 5:15 AM

## 2025-04-24 NOTE — PROGRESS NOTES
[facility]    Vascular Neurology Progress Note    Interval History   BP trend SBP 97-140s. Mostly 115-130s. Currently 128/71  HR is in late 50s-60s.   -150.   Na is stable at 138-143. Today 139.   Temp is stable 97.4-98.4.   WBC downward trend from 4/22 from 18.7 to 8.2 since starting Ancef     NG tube and tube feeding initiated yesterday. Neurosurgery notes residual right brain metastasis from left parotid carcinoma will likely need radiation therapy and consulted radiation oncology. If Dayday clinically improves, they recommend radiotherapy.     Respiratory therapy is monitoring daily for weaning readiness (day 3 on ventilator). SBT failed d/t apnea and cough. Nursing notes report episode of hypotension 97/58, given 250 NS bolus and levophed and BP increased to 134/64.   Hospital Course     Chief complaint: No chief complaint on file.    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 MRI showed large right temporal lobe mass and associated hemorrhage. He was discharged on 4/18 and returned on 4/22 for right temporal craniotomy. Postop he had witnessed seizures and cardiopulmonary arrest. Initial EEG shows right focal slowing and right epileptogenic discharges.     Assessment and Plan     # Large right anterior temporal lobe metastatic mass excision with associated hemorrhage and cerebral edema s/p right frontotemporal craniotomy. Repeat CT on 4/23 did not show significant intracranial changes.     #Postoperative cardiopulmonary arrest s/p CPR (epi x 2) with ROSC    #Seizures 2/2 above    # Atrial fibrillation (not currently anticoagulated/ stopped due to scheduled craniotomy)     DVT Prophylaxis:   "SCDs, hold pharmacologic VTE prophylaxis until cleared by neurosurgery     Recommendations   - Neuro checks and vital signs every 1 hour  - Appreciate neurosurgery recommendations  - Continue SBP < 140   - Continue maintenance Keppra 1.5 g every 12 hours following  - Seizure precautions, Continuous video EEG for seizure monitoring  - PRN IV Ativan 2 mg q 5 minutes if clinical seizure activity noted and lasts longer than 2 minutes, STAT page to NCC team  -Propofol is held to see if patient will become alert to attempt extubation.  -hold any anticoagulation/antiplatelets/NSAIDs until cleared by neurosurgery  -elevate head of bed 30 degrees  -recommend euthermia, euglycemia (recommend BG <180), eunatremia    Patient Follow-up    - final recommendation pending work-up    We will continue to follow.     Layla Alicea PA-C  Vascular Neurology    To page me or covering stroke neurology team member, click here: [employer]  Choose \"On Call\" tab at top, then select \"NEUROLOGY/ALL SITES\" from middle drop-down box, press Enter, then look for \"stroke\" or \"telestroke\" for your site.    Physical Examination     Temp: 98.3  F (36.8  C) Temp src: Axillary BP: 128/71 Pulse: 80     SpO2: 100 % O2 Device: Mechanical Ventilator        General:  appears not in distress, eyes closed when I walked into his room, not sedated but recently received ativan due to agitation      Neurologic exam:     Mental status: Stupor, opens eyes in response to sternal noxious, does follow command of moving tongue, does not follow commands of opening eyes, holding thumb up, or moving toes     Cranial nerves: NPI: Right eye is 4.9, and left eye is 4.9. Pupil diameters include right eye is 2.07 mm, left eye is 2.26 mm. strong corneal reflex, no cough or gag reflex with ventilation tube.      Motor: antigravity of left leg, able to move all limbs in response to painful stimuli of nail bed and inner thigh, withdrawals to painful stimuli with right leg more than " "left     Sensation: occasionally responds to painful stimuli in upper and lower extremities      Coordination: unable to test coordination     Gait:  deferred      Imaging/Labs   (Bolded imaging and labs new and/or personally reviewed or re-reviewed by me today)    MRI/Head CT CT head 4/16: Large right anterior temporal lobe mass 5.3 x 4 x 3.8 cm and associated hemorrhage posterior to mass. Significant vasogenic edema with right to left midline shift 6.6 mm.      CT head 4/22: Interval right frontopariteal and temporal cranioomy. Small hemorrhage and gas cavity. Stable vasogenic edema. Persistent mild effacement of right lateral ventricle.     CT head 4/23: Stable postop right craniotomy with small blood and bluid along margin. Unchanged mass in right temporal lob and vasogenic edema, and unchanged mass effect including trace leftward midline shift.      MRI brain 4/16: Large mass right temporal lobe with vasogenic edema measures 5.3x3.8x4.3. Right to left midline shift 4 to 5 mm.      MRI brain 4/23: Postop changes related to right pterional craniotomy in anterior right temporal lobe. 4.6 x 2.8 x 3 cm area may represent residual tumor. Slightly diminished edema in right temporal/frontal area. 4 mm right to left midline shift.    Intracranial Vasculature CTA head 4/17: NO LVO. Plaque in bilateral carotid siphons. Unchanged 6 mm leftward midline shift. Anterior displacement of right MCA due to right mass.    Cervical Vasculature CTA neck 4/17: Plaque in bilateral carotid bifurcation      Echocardiogram N/A   EKG/Telemetry Atrial fibrillation with LBBB      LDL  No lab value available in past 30 days   A1C  2/10/2025: 6.9 %  4/23/2025: 7.9 %   Troponin 4/22/2025: 53 ng/L     Other labs reviewed by me today:  \"EEG day 1 showed electroencephalogram is abnormal due to the presences of background slow activity suggesting moderate encephalopathy and focal slowing in the right temporal region. No electrographic seizures or " "epileptiform discharges were recorded. There were two events of stimulus induced rhythmic periodic discharges that lacked the criteria to be diagnosed as seizure\"     Time Spent on this Encounter   Billing: I have personally spent a total of 60 minutes providing care today, time spent in reviewing medical records and devising the plan as recorded above.   "

## 2025-04-24 NOTE — PROGRESS NOTES
[facility] ICU RESPIRATORY NOTE        Date of Admission: 4/22/2025    Date of Intubation (most recent):  4/22/25    Reason for Mechanical Ventilation: AW protection    Number of Days on Mechanical Ventilation: 3    Met Criteria for Spontaneous Breathing Trial: Yes - PS 10-5/5 for 5.5 hrs    Significant Events Today: None    ABG Results:   Recent Labs   Lab 04/22/25  1449 04/22/25  1349 04/22/25  1332   PH 7.38  --  7.03*   PCO2 35  --  46*   PO2 154*  --  486*   HCO3 21  --  12*   O2PER 30 0  --          Current Vent Settings: FiO2 (%): 30 %, Resp: 15, Vent Mode: VC/AC, Resp Rate (Set): 16 breaths/min, Tidal Volume (Set, mL): 550 mL, PEEP (cm H2O): 5 cmH2O, Pressure Support (cm H2O): 5 cmH2O (Changed by RT at bedside), Resp Rate (Set): 16 breaths/min, Tidal Volume (Set, mL): 550 mL, PEEP (cm H2O): 5 cmH2O    Skin Assessment: Intact    Plan: Pt to remain on full vent support overnight    Bay Melo, RT

## 2025-04-25 ENCOUNTER — APPOINTMENT (OUTPATIENT)
Dept: OCCUPATIONAL THERAPY | Facility: CLINIC | Age: 85
DRG: 025 | End: 2025-04-25
Attending: NURSE PRACTITIONER
Payer: COMMERCIAL

## 2025-04-25 ENCOUNTER — APPOINTMENT (OUTPATIENT)
Dept: PHYSICAL THERAPY | Facility: CLINIC | Age: 85
DRG: 025 | End: 2025-04-25
Attending: NEUROLOGICAL SURGERY
Payer: COMMERCIAL

## 2025-04-25 ENCOUNTER — HOSPITAL ENCOUNTER (INPATIENT)
Dept: NEUROLOGY | Facility: CLINIC | Age: 85
Discharge: HOME | DRG: 025 | End: 2025-04-25
Attending: STUDENT IN AN ORGANIZED HEALTH CARE EDUCATION/TRAINING PROGRAM | Admitting: NEUROLOGICAL SURGERY
Payer: COMMERCIAL

## 2025-04-25 VITALS
WEIGHT: 179.23 LBS | SYSTOLIC BLOOD PRESSURE: 96 MMHG | BODY MASS INDEX: 25.09 KG/M2 | OXYGEN SATURATION: 100 % | DIASTOLIC BLOOD PRESSURE: 48 MMHG | HEIGHT: 71 IN | RESPIRATION RATE: 13 BRPM | HEART RATE: 53 BPM | TEMPERATURE: 97.5 F

## 2025-04-25 LAB
ANION GAP SERPL CALCULATED.3IONS-SCNC: 9 MMOL/L (ref 7–15)
ATRIAL RATE - MUSE: 258 BPM
ATRIAL RATE - MUSE: 63 BPM
BACTERIA BLD CULT: NORMAL
BACTERIA BLD CULT: NORMAL
BACTERIA UR CULT: NO GROWTH
BASE EXCESS BLDV CALC-SCNC: -1.4 MMOL/L (ref -3–3)
BASOPHILS # BLD AUTO: 0 10E3/UL (ref 0–0.2)
BASOPHILS NFR BLD AUTO: 0 %
BUN SERPL-MCNC: 30.7 MG/DL (ref 8–23)
CA-I BLD-MCNC: 4.9 MG/DL (ref 4.4–5.2)
CALCIUM SERPL-MCNC: 9.1 MG/DL (ref 8.8–10.4)
CHLORIDE SERPL-SCNC: 108 MMOL/L (ref 98–107)
CK SERPL-CCNC: 35 U/L (ref 39–308)
CREAT SERPL-MCNC: 1.01 MG/DL (ref 0.67–1.17)
DIASTOLIC BLOOD PRESSURE - MUSE: NORMAL MMHG
DIASTOLIC BLOOD PRESSURE - MUSE: NORMAL MMHG
EGFRCR SERPLBLD CKD-EPI 2021: 74 ML/MIN/1.73M2
EOSINOPHIL # BLD AUTO: 0 10E3/UL (ref 0–0.7)
EOSINOPHIL NFR BLD AUTO: 0 %
ERYTHROCYTE [DISTWIDTH] IN BLOOD BY AUTOMATED COUNT: 14.7 % (ref 10–15)
ERYTHROCYTE [DISTWIDTH] IN BLOOD BY AUTOMATED COUNT: 14.9 % (ref 10–15)
GLUCOSE BLDC GLUCOMTR-MCNC: 135 MG/DL (ref 70–99)
GLUCOSE BLDC GLUCOMTR-MCNC: 137 MG/DL (ref 70–99)
GLUCOSE BLDC GLUCOMTR-MCNC: 141 MG/DL (ref 70–99)
GLUCOSE BLDC GLUCOMTR-MCNC: 145 MG/DL (ref 70–99)
GLUCOSE BLDC GLUCOMTR-MCNC: 153 MG/DL (ref 70–99)
GLUCOSE BLDC GLUCOMTR-MCNC: 154 MG/DL (ref 70–99)
GLUCOSE BLDC GLUCOMTR-MCNC: 158 MG/DL (ref 70–99)
GLUCOSE BLDC GLUCOMTR-MCNC: 161 MG/DL (ref 70–99)
GLUCOSE BLDC GLUCOMTR-MCNC: 167 MG/DL (ref 70–99)
GLUCOSE BLDC GLUCOMTR-MCNC: 169 MG/DL (ref 70–99)
GLUCOSE BLDC GLUCOMTR-MCNC: 173 MG/DL (ref 70–99)
GLUCOSE BLDC GLUCOMTR-MCNC: 173 MG/DL (ref 70–99)
GLUCOSE BLDC GLUCOMTR-MCNC: 175 MG/DL (ref 70–99)
GLUCOSE BLDC GLUCOMTR-MCNC: 178 MG/DL (ref 70–99)
GLUCOSE BLDC GLUCOMTR-MCNC: 182 MG/DL (ref 70–99)
GLUCOSE SERPL-MCNC: 172 MG/DL (ref 70–99)
HCO3 BLDV-SCNC: 22 MMOL/L (ref 21–28)
HCO3 SERPL-SCNC: 21 MMOL/L (ref 22–29)
HCT VFR BLD AUTO: 29 % (ref 40–53)
HCT VFR BLD AUTO: 29.1 % (ref 40–53)
HGB BLD-MCNC: 9.6 G/DL (ref 13.3–17.7)
HGB BLD-MCNC: 9.6 G/DL (ref 13.3–17.7)
IMM GRANULOCYTES # BLD: 0.1 10E3/UL
IMM GRANULOCYTES NFR BLD: 1 %
INTERPRETATION ECG - MUSE: NORMAL
INTERPRETATION ECG - MUSE: NORMAL
LYMPHOCYTES # BLD AUTO: 0.4 10E3/UL (ref 0.8–5.3)
LYMPHOCYTES NFR BLD AUTO: 5 %
MAGNESIUM SERPL-MCNC: 1.8 MG/DL (ref 1.7–2.3)
MCH RBC QN AUTO: 30.3 PG (ref 26.5–33)
MCH RBC QN AUTO: 30.6 PG (ref 26.5–33)
MCHC RBC AUTO-ENTMCNC: 33 G/DL (ref 31.5–36.5)
MCHC RBC AUTO-ENTMCNC: 33.1 G/DL (ref 31.5–36.5)
MCV RBC AUTO: 92 FL (ref 78–100)
MCV RBC AUTO: 92 FL (ref 78–100)
MONOCYTES # BLD AUTO: 0.4 10E3/UL (ref 0–1.3)
MONOCYTES NFR BLD AUTO: 5 %
NEUTROPHILS # BLD AUTO: 7.3 10E3/UL (ref 1.6–8.3)
NEUTROPHILS NFR BLD AUTO: 89 %
NRBC # BLD AUTO: 0 10E3/UL
NRBC BLD AUTO-RTO: 0 /100
O2/TOTAL GAS SETTING VFR VENT: 30 %
OXYHGB MFR BLDV: 92 % (ref 70–75)
P AXIS - MUSE: -81 DEGREES
P AXIS - MUSE: NORMAL DEGREES
PCO2 BLDV: 33 MM HG (ref 40–50)
PH BLDV: 7.44 [PH] (ref 7.32–7.43)
PHOSPHATE SERPL-MCNC: 2.6 MG/DL (ref 2.5–4.5)
PLATELET # BLD AUTO: 77 10E3/UL (ref 150–450)
PLATELET # BLD AUTO: 80 10E3/UL (ref 150–450)
PO2 BLDV: 68 MM HG (ref 25–47)
POTASSIUM SERPL-SCNC: 4.1 MMOL/L (ref 3.4–5.3)
PR INTERVAL - MUSE: 328 MS
PR INTERVAL - MUSE: NORMAL MS
QRS DURATION - MUSE: 126 MS
QRS DURATION - MUSE: 134 MS
QT - MUSE: 506 MS
QT - MUSE: 512 MS
QTC - MUSE: 422 MS
QTC - MUSE: 517 MS
R AXIS - MUSE: 110 DEGREES
R AXIS - MUSE: 74 DEGREES
RBC # BLD AUTO: 3.14 10E6/UL (ref 4.4–5.9)
RBC # BLD AUTO: 3.17 10E6/UL (ref 4.4–5.9)
SAO2 % BLDV: 93.8 % (ref 70–75)
SODIUM SERPL-SCNC: 138 MMOL/L (ref 135–145)
SYSTOLIC BLOOD PRESSURE - MUSE: NORMAL MMHG
SYSTOLIC BLOOD PRESSURE - MUSE: NORMAL MMHG
T AXIS - MUSE: 247 DEGREES
T AXIS - MUSE: 26 DEGREES
TROPONIN T SERPL HS-MCNC: 76 NG/L
TROPONIN T SERPL HS-MCNC: 76 NG/L
VENTRICULAR RATE- MUSE: 41 BPM
VENTRICULAR RATE- MUSE: 63 BPM
WBC # BLD AUTO: 6.9 10E3/UL (ref 4–11)
WBC # BLD AUTO: 8.2 10E3/UL (ref 4–11)

## 2025-04-25 PROCEDURE — 200N000001 HC R&B ICU

## 2025-04-25 PROCEDURE — 999N000157 HC STATISTIC RCP TIME EA 10 MIN

## 2025-04-25 PROCEDURE — 250N000013 HC RX MED GY IP 250 OP 250 PS 637: Performed by: NEUROLOGICAL SURGERY

## 2025-04-25 PROCEDURE — 84132 ASSAY OF SERUM POTASSIUM: CPT | Performed by: STUDENT IN AN ORGANIZED HEALTH CARE EDUCATION/TRAINING PROGRAM

## 2025-04-25 PROCEDURE — 97530 THERAPEUTIC ACTIVITIES: CPT | Mod: GO

## 2025-04-25 PROCEDURE — 82330 ASSAY OF CALCIUM: CPT | Performed by: STUDENT IN AN ORGANIZED HEALTH CARE EDUCATION/TRAINING PROGRAM

## 2025-04-25 PROCEDURE — 84484 ASSAY OF TROPONIN QUANT: CPT | Performed by: INTERNAL MEDICINE

## 2025-04-25 PROCEDURE — 97530 THERAPEUTIC ACTIVITIES: CPT | Mod: GP

## 2025-04-25 PROCEDURE — 95711 VEEG 2-12 HR UNMONITORED: CPT

## 2025-04-25 PROCEDURE — 250N000011 HC RX IP 250 OP 636: Performed by: NURSE PRACTITIONER

## 2025-04-25 PROCEDURE — 99291 CRITICAL CARE FIRST HOUR: CPT | Mod: 24 | Performed by: NURSE PRACTITIONER

## 2025-04-25 PROCEDURE — 36415 COLL VENOUS BLD VENIPUNCTURE: CPT | Performed by: PHYSICIAN ASSISTANT

## 2025-04-25 PROCEDURE — 93010 ELECTROCARDIOGRAM REPORT: CPT | Performed by: INTERNAL MEDICINE

## 2025-04-25 PROCEDURE — 250N000013 HC RX MED GY IP 250 OP 250 PS 637: Performed by: NURSE PRACTITIONER

## 2025-04-25 PROCEDURE — 83735 ASSAY OF MAGNESIUM: CPT | Performed by: STUDENT IN AN ORGANIZED HEALTH CARE EDUCATION/TRAINING PROGRAM

## 2025-04-25 PROCEDURE — 97165 OT EVAL LOW COMPLEX 30 MIN: CPT | Mod: GO

## 2025-04-25 PROCEDURE — 94003 VENT MGMT INPAT SUBQ DAY: CPT

## 2025-04-25 PROCEDURE — 95718 EEG PHYS/QHP 2-12 HR W/VEEG: CPT | Performed by: PSYCHIATRY & NEUROLOGY

## 2025-04-25 PROCEDURE — 93005 ELECTROCARDIOGRAM TRACING: CPT

## 2025-04-25 PROCEDURE — 99291 CRITICAL CARE FIRST HOUR: CPT | Mod: 24 | Performed by: ANESTHESIOLOGY

## 2025-04-25 PROCEDURE — 250N000013 HC RX MED GY IP 250 OP 250 PS 637: Performed by: STUDENT IN AN ORGANIZED HEALTH CARE EDUCATION/TRAINING PROGRAM

## 2025-04-25 PROCEDURE — 82550 ASSAY OF CK (CPK): CPT | Performed by: INTERNAL MEDICINE

## 2025-04-25 PROCEDURE — 36415 COLL VENOUS BLD VENIPUNCTURE: CPT | Performed by: STUDENT IN AN ORGANIZED HEALTH CARE EDUCATION/TRAINING PROGRAM

## 2025-04-25 PROCEDURE — 36415 COLL VENOUS BLD VENIPUNCTURE: CPT | Performed by: INTERNAL MEDICINE

## 2025-04-25 PROCEDURE — 250N000011 HC RX IP 250 OP 636: Mod: JZ | Performed by: STUDENT IN AN ORGANIZED HEALTH CARE EDUCATION/TRAINING PROGRAM

## 2025-04-25 PROCEDURE — 258N000003 HC RX IP 258 OP 636: Performed by: STUDENT IN AN ORGANIZED HEALTH CARE EDUCATION/TRAINING PROGRAM

## 2025-04-25 PROCEDURE — 250N000009 HC RX 250: Performed by: STUDENT IN AN ORGANIZED HEALTH CARE EDUCATION/TRAINING PROGRAM

## 2025-04-25 PROCEDURE — 85027 COMPLETE CBC AUTOMATED: CPT | Performed by: STUDENT IN AN ORGANIZED HEALTH CARE EDUCATION/TRAINING PROGRAM

## 2025-04-25 PROCEDURE — 85027 COMPLETE CBC AUTOMATED: CPT | Performed by: PHYSICIAN ASSISTANT

## 2025-04-25 PROCEDURE — 84100 ASSAY OF PHOSPHORUS: CPT | Performed by: STUDENT IN AN ORGANIZED HEALTH CARE EDUCATION/TRAINING PROGRAM

## 2025-04-25 PROCEDURE — 82805 BLOOD GASES W/O2 SATURATION: CPT | Performed by: INTERNAL MEDICINE

## 2025-04-25 RX ORDER — LEVETIRACETAM 100 MG/ML
1500 SOLUTION ORAL EVERY 12 HOURS
Status: DISCONTINUED | OUTPATIENT
Start: 2025-04-26 | End: 2025-04-29

## 2025-04-25 RX ADMIN — DEXAMETHASONE SODIUM PHOSPHATE 6 MG: 10 INJECTION, SOLUTION INTRAMUSCULAR; INTRAVENOUS at 08:25

## 2025-04-25 RX ADMIN — LEVETIRACETAM 1500 MG: 100 INJECTION, SOLUTION INTRAVENOUS at 14:35

## 2025-04-25 RX ADMIN — HYDRALAZINE HYDROCHLORIDE 10 MG: 20 INJECTION INTRAMUSCULAR; INTRAVENOUS at 23:57

## 2025-04-25 RX ADMIN — INSULIN HUMAN 4 UNITS/HR: 1 INJECTION, SOLUTION INTRAVENOUS at 01:42

## 2025-04-25 RX ADMIN — ACETAMINOPHEN 975 MG: 325 TABLET, FILM COATED ORAL at 08:01

## 2025-04-25 RX ADMIN — HYDROMORPHONE HYDROCHLORIDE 0.5 MG: 1 INJECTION, SOLUTION INTRAMUSCULAR; INTRAVENOUS; SUBCUTANEOUS at 05:06

## 2025-04-25 RX ADMIN — ATORVASTATIN CALCIUM 40 MG: 40 TABLET, FILM COATED ORAL at 08:02

## 2025-04-25 RX ADMIN — SENNOSIDES AND DOCUSATE SODIUM 1 TABLET: 50; 8.6 TABLET ORAL at 08:02

## 2025-04-25 RX ADMIN — DEXAMETHASONE SODIUM PHOSPHATE 6 MG: 10 INJECTION, SOLUTION INTRAMUSCULAR; INTRAVENOUS at 20:25

## 2025-04-25 RX ADMIN — LEVOTHYROXINE SODIUM 125 MCG: 125 TABLET ORAL at 08:02

## 2025-04-25 RX ADMIN — LEVETIRACETAM 1500 MG: 100 INJECTION, SOLUTION INTRAVENOUS at 01:38

## 2025-04-25 RX ADMIN — CHLORHEXIDINE GLUCONATE 15 ML: 1.2 SOLUTION ORAL at 08:02

## 2025-04-25 RX ADMIN — FAMOTIDINE 20 MG: 20 TABLET, FILM COATED ORAL at 08:02

## 2025-04-25 RX ADMIN — HYDROMORPHONE HYDROCHLORIDE 0.5 MG: 1 INJECTION, SOLUTION INTRAMUSCULAR; INTRAVENOUS; SUBCUTANEOUS at 00:56

## 2025-04-25 RX ADMIN — POLYETHYLENE GLYCOL 3350 17 G: 17 POWDER, FOR SOLUTION ORAL at 08:02

## 2025-04-25 ASSESSMENT — ACTIVITIES OF DAILY LIVING (ADL)
ADLS_ACUITY_SCORE: 57
ADLS_ACUITY_SCORE: 55
ADLS_ACUITY_SCORE: 55
DEPENDENT_IADLS:: CLEANING;COOKING;LAUNDRY;SHOPPING;TRANSPORTATION
ADLS_ACUITY_SCORE: 57
ADLS_ACUITY_SCORE: 57
ADLS_ACUITY_SCORE: 55
ADLS_ACUITY_SCORE: 57
ADLS_ACUITY_SCORE: 55
ADLS_ACUITY_SCORE: 55
ADLS_ACUITY_SCORE: 57
ADLS_ACUITY_SCORE: 57
ADLS_ACUITY_SCORE: 55
ADLS_ACUITY_SCORE: 57
ADLS_ACUITY_SCORE: 57

## 2025-04-25 NOTE — PROGRESS NOTES
04/25/25 1101   Appointment Info   Signing Clinician's Name / Credentials (PT) Leslie Dykes, PT, DPT   Interventions   Interventions Quick Adds Therapeutic Activity   Therapeutic Activity   Therapeutic Activities: dynamic activities to improve functional performance Minutes (55380) 40   Symptoms Noted During/After Treatment Fatigue   Treatment Detail/Skilled Intervention Extubated this morning, on 2L O2. Awake in bed, restless but not pulling at lines or anything excessive. Tries to talk, very challenging to understand anything other than basic words at this point. Cotx with OT to maximize mobility. Increased time to set up lines/tubes (EEG still connected, tube feed, IVs, vitals). When discussed plan with patient he is quite eager and tries to get up right away and then continues while therapists getting room ready, but able to follow cuing to wait. Supine to seated EOB with min assist where pt was able to scoot his legs over and sit up. In seated continues to need ~ min assist for balance as he moves frequently in multiple directions- mostly anterior or posterior. Can follow cuing to sit up better and find midline again. Sit to stand with arm in arm assist of 2, ~mod assist for balance especially with knees briefly buckling. Sat after a few seconds first rep, second rep able  to hold for ~30 seconds. Laid back down with mod assist. Used flat dodie sheet to dodie lift pt to commbilizer (due to restlessness opted for commbilizer instead of recliner). Pt snoring by the time he got up to chair, appears tired. Vitals stable.   PT Discharge Planning   PT Plan trial bernard adam vs arm in arm with knee block for buckling; pregait and gait when ready   PT Discharge Recommendation (DC Rec) Transitional Care Facility;Acute Rehab Center-Motivated patient will benefit from intensive, interdisciplinary therapy.  Anticipate will be able to tolerate 3 hours of therapy per day   PT Rationale for DC Rec Patient demonstrates  significant improvment for participation today vs. eval. Today pt was able to stand a couple times and is able to follow cuing. Continues to have signficant weakness and balance deficits. Will benefit from rehab to address this. Should consider ARU.   PT Brief overview of current status min assist bed mob, mod assist standing; dodie lift tx   PT Total Distance Amb During Session (feet) 0   Physical Therapy Time and Intention   Timed Code Treatment Minutes 40   Total Session Time (sum of timed and untimed services) 40

## 2025-04-25 NOTE — PROGRESS NOTES
[facility]    Neurosurgery  Daily Note    Assessment & Plan   Procedure(s):  RIGHT FRONTO-TEMPORAL CRANIOTOMY, USING OPTICAL TRACKING SYSTEM, WITH NEOPLASM EXCISION   3 Days Post-Op  MD Erickson  Final Diagnosis   A, B.  Brain, right temporal, excision-  Metastatic small cell carcinoma      AM ROUNDS: intubated, bradycardic this AM, EKG done-a fib, EEG in process  Exam: intubated. Awakens with exam, MUJICA on command, incision is c/d/i  Labs: Na 138, Hgb 9.6, platelet 77k  Imaging: head CT 4/23 stable    Plan:  Recheck platelets this afternoon. If drop <75k, should transfuse for goal platelets >75k    -OK to extubate from nsgy standpoint. Defer to ICU team  -Pain control  -Decadron 2 weeks to off orders in   -Seizure meds per neurology  -wound cares. May leave open to air  -Rad Onc following    Dr. Erickson in agreement    Chrissy Jackson PA-C  [facility] Neurosurgery  [address] [place] [address]  [address] [place] [address]  Tel [phone]  Fax [phone]  Text page via [facility] Paging/Directory    Active Problems:    S/P craniotomy     Chrissy Modi PA-C    Interval History   Stable     Physical Exam   Temp: 97.6  F (36.4  C) Temp src: Axillary BP: 110/48 Pulse: 57   Resp: 19 SpO2: 100 % O2 Device: Mechanical Ventilator    Vitals:    04/22/25 1430 04/23/25 0342 04/25/25 0600   Weight: 82.2 kg (181 lb 3.5 oz) 81.3 kg (179 lb 3.7 oz) 85.7 kg (188 lb 15 oz)     Vital Signs with Ranges  Temp:  [97.5  F (36.4  C)-98.9  F (37.2  C)] 97.6  F (36.4  C)  Pulse:  [40-69] 57  Resp:  [11-19] 19  BP: ()/(46-91) 110/48  MAP:  [65 mmHg] 65 mmHg  Arterial Line BP: (117)/(45) 117/45  FiO2 (%):  [30 %] 30 %  SpO2:  [90 %-100 %] 100 %  I/O last 3 completed shifts:  In: 1402.69 [I.V.:502.69; NG/GT:300]  Out: 1200 [Urine:1200]    Awakens with exam, pupils react, MUJICA on command, incision is c/d/i      Medications   Current Facility-Administered Medications   Medication Dose Route Frequency Provider Last Rate  Last Admin    dextrose 10% infusion   Intravenous Continuous PRN Leydi Meyers MD        dextrose 10% infusion   Intravenous Continuous PRN Leydi Meyers MD        insulin regular (MYXREDLIN) 1 unit/mL infusion  0-24 Units/hr Intravenous Continuous Leydi Meyers MD 3 mL/hr at 04/25/25 0806 3 Units/hr at 04/25/25 0806    sodium chloride 0.9 % infusion   Intravenous Continuous Leydi Meyers MD 20 mL/hr at 04/23/25 1557 New Bag at 04/23/25 1557      Current Facility-Administered Medications   Medication Dose Route Frequency Provider Last Rate Last Admin    atorvastatin (LIPITOR) tablet 40 mg  40 mg Oral or Feeding Tube Daily Leydi Meyers MD   40 mg at 04/25/25 0802    chlorhexidine (PERIDEX) 0.12 % solution 15 mL  15 mL Mouth/Throat Q12H Leydi Meyers MD   15 mL at 04/25/25 0802    dexAMETHasone PF (DECADRON) injection 6 mg  6 mg Intravenous Q12H Nelly Coon NP   6 mg at 04/25/25 0825    Followed by    [START ON 4/26/2025] dexAMETHasone (DECADRON) injection 4 mg  4 mg Intravenous Q12H Nelly Coon NP        Followed by    [START ON 4/28/2025] dexAMETHasone (DECADRON) injection 4 mg  4 mg Intravenous Daily Nelly Coon NP        Followed by    [START ON 5/1/2025] dexAMETHasone (DECADRON) injection 2 mg  2 mg Intravenous Daily Nelly Coon NP        Followed by    [START ON 5/4/2025] dexAMETHasone (DECADRON) injection 1 mg  1 mg Intravenous Daily Nelly Coon NP        [Held by provider] isosorbide mononitrate (IMDUR) 24 hr tablet 30 mg  30 mg Oral Daily Leydi Meyers MD        levETIRAcetam (KEPPRA) 1,500 mg in sodium chloride 0.9 % 125 mL intermittent infusion  1,500 mg Intravenous Q12H Kami Novak  mL/hr at 04/25/25 0138 1,500 mg at 04/25/25 0138    levothyroxine (SYNTHROID/LEVOTHROID) tablet 125 mcg  125 mcg Oral or Feeding Tube Daily Leydi Meyers MD   125 mcg at 04/25/25 0802    polyethylene glycol (MIRALAX) Packet 17 g  17 g Oral or Feeding Tube  Daily Nelly Coon NP   17 g at 04/25/25 0802    senna-docusate (SENOKOT-S/PERICOLACE) 8.6-50 MG per tablet 1 tablet  1 tablet Oral or Feeding Tube BID Nelly Coon NP   1 tablet at 04/25/25 0802    sodium chloride (PF) 0.9% PF flush 3 mL  3 mL Intracatheter Q8H BOYD Nelly Coon NP   3 mL at 04/24/25 0518    sodium chloride 0.9% BOLUS 250 mL  250 mL Intravenous Once Leydi Meyers MD   Rate Verify at 04/23/25 2078

## 2025-04-25 NOTE — PROGRESS NOTES
04/25/25 1139   Appointment Info   Signing Clinician's Name / Credentials (OT) Tom Dumont, OTR/L   Rehab Comments (OT) crani   Living Environment   People in Home child(sharita), adult   Current Living Arrangements apartment   Home Accessibility no concerns   Transportation Anticipated family or friend will provide   Self-Care   Usual Activity Tolerance good   Current Activity Tolerance poor   Equipment Currently Used at Home cane, straight   Fall history within last six months no   Activity/Exercise/Self-Care Comment subjective per chart. pt unable to state at this time. Per chart, pt is independent at baseline, lives with son. 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 He had a seizure and cardiopulmonary arrest in PACU requiring re-intubation and CPR with ROSC. The patient was admitted to the ICU for medical management. no POD 3 from RIGHT FRONTO-TEMPORAL CRANIOTOMY   Existing Precautions/Restrictions fall  (crani)   Limitations/Impairments safety/cognitive   Cognitive Status Examination   Cognitive Status Comments Not able to assess orientation as pt minimally able to verbalize response to questions. Does say some yes/no when prompted. Instructed pt to hold up 2 fingers and pt able. Then held up 1 finger and told pt to copy, pt proceeds to hold up 4 fingers. Pt does follow simple 1 step  commandes such as lean L, stand up, or lift your arm.   Visual Perception   Visual Impairment/Limitations unable/difficult to assess   Strength Comprehensive (MMT)   Comment, General Manual Muscle Testing (MMT) Assessment Global weakness   Coordination   Functional Limitations Decreased speed;Fine motor ADL performance impaired;Object transport impaired;Reach to targets impaired   Bed Mobility   Bed Mobility supine-sit;sit-supine;scooting/bridging   Scooting/Bridging Dixon (Bed Mobility) dependent (less than 25% patient effort)   Supine-Sit Dixon (Bed Mobility) minimum assist (75% patient effort)   Sit-Supine Dixon (Bed Mobility) moderate assist (50% patient effort)   Transfers   Transfers sit-stand transfer   Sit-Stand Transfer   Sit-Stand Dixon (Transfers) 2 person assist;moderate assist (50% patient effort)   Balance   Balance Comments impaired balance at EOB needing min-mod A   Activities of Daily Living   BADL Assessment/Intervention lower body dressing;toileting   Lower Body Dressing Assessment/Training   Dixon Level (Lower Body Dressing) dependent (less than 25% patient effort)   Toileting   Dixon Level (Toileting) dependent (less than 25% patient effort)   Clinical Impression   Criteria for Skilled Therapeutic Interventions Met (OT) Yes, treatment indicated   OT Diagnosis impaired ADL   OT Problem List-Impairments impacting ADL problems related to;activity tolerance impaired;balance;cognition;mobility;range of motion (ROM)   Assessment of Occupational Performance 5 or more Performance Deficits   Identified Performance Deficits dressing, bathing, toileting, functional mobility, grooming, IADLs   Planned Therapy Interventions (OT) ADL retraining;cognition;strengthening;transfer training;progressive activity/exercise   Clinical Decision Making Complexity (OT) problem focused assessment/low complexity   Risk & Benefits of therapy have been explained evaluation/treatment  results reviewed;care plan/treatment goals reviewed;risks/benefits reviewed;current/potential barriers reviewed;participants voiced agreement with care plan;participants included;patient   OT Total Evaluation Time   OT Eval, Low Complexity Minutes (61260) 10   OT Goals   Therapy Frequency (OT) 6 times/week   OT Predicted Duration/Target Date for Goal Attainment 05/15/25   OT Goals Hygiene/Grooming;Upper Body Dressing;Lower Body Dressing;Toilet Transfer/Toileting;Aerobic Activity   OT: Hygiene/Grooming supervision/stand-by assist;using adaptive equipment;within precautions   OT: Upper Body Dressing Supervision/stand-by assist;using adaptive equipment;within precautions   OT: Lower Body Dressing Supervision/stand-by assist;using adaptive equipment;within precautions   OT: Toilet Transfer/Toileting Minimal assist;toilet transfer;cleaning and garment management;using adaptive equipment;within precautions   OT: Cognitive Patient/caregiver will verbalize understanding of cognitive assessment results/recommendations as needed for safe discharge planning   Interventions   Interventions Quick Adds Therapeutic Activity   Therapeutic Activities   Therapeutic Activity Minutes (70405) 30   Symptoms noted during/after treatment fatigue   Treatment Detail/Skilled Intervention Co treat w/ PT do to higher need for skilled handling / assist. Time to manage multiple lines. Pt needing cues to wait to sit up as he begins moving limbs to EOB w/o assist and prior to env. being set. dangling EOB w/ min A-mod A for seated balance. VCs for correcting lean as pt leaning anteriorly, L, R, and posteriorly. does correct w/ cues but needs frequent reminders. Ax2, third assist nearby for safety, with STS. Standing w/ mod A (arm in arm. VCs  to stand upright, look out of window. needs block at knees as pt buckling. Back to EOB as pt fatigues quickly. Can stand again w/ min A x2 and 1 VC to stand. standing ~1 min with mod A (2 person assist) for  balance as bed is prepared for transfer supine. Cues for balance in standing as pt leaning R. Unable to lift LEs to step laterally at EOB but does  appear to shift weight. sitting EOB w/ knees blocked bilaterally. sit > supine w/ mod assist, assist to lift LEs in bed. Used OH lift and green sheet to lift pt to combilizer.  Positioning pt in seated position in combilizer. RN present at OT departure.   OT Discharge Planning   OT Plan Ax2 EOB for seated for simple ADL/ grooming tasks. funtional cognition, SS vs Ax2 stand pivot if pt appropriate   OT Discharge Recommendation (DC Rec) Transitional Care Facility   OT Rationale for DC Rec The patient presents well below baseline with decreased activity tolerance, cognitive deficits, weakness, and post surgical precautions. The patient is currently needing assist of 2-3 vs ceiling lift for transfers and ADL. recommend TCU at discharge to progress safety and IND w/ ADLs.   OT Brief overview of current status Goals of therapy will be to address safe mobility and make recs for d/c to next level of care. Pt and RN will continue to follow all falls risk precautions as documented by RN staff while hospitalized.   OT Total Distance Amb During Session (feet) 0   Total Session Time   Timed Code Treatment Minutes 30   Total Session Time (sum of timed and untimed services) 40

## 2025-04-25 NOTE — PLAN OF CARE
Goal Outcome Evaluation:      Plan of Care Reviewed With: family    Overall Patient Progress: improvingOverall Patient Progress: improving    Outcome Evaluation: Assumed cares from 4383-3770. Neuros completed per order; see neuro flowsheet for detailed assessments. Decadron started today. Ongoing intermittent restlessness; prn dilaudid does help. ART line removed today. Soft to normotensive blood pressures. Pt pressure supported from 1230 to 1800 5/5. Possible extubation tomorrow. Pt had a few soft bowel movements this shift. TF being titrated to goal. Duran in place with adequate UOP. Towards end of shift, pt had frequent bradycardia episodes in the 40's ECG ordered and awaiting results.      Problem: Delirium  Goal: Optimal Coping  Outcome: Not Progressing  Goal: Improved Attention and Thought Clarity  Outcome: Not Progressing  Intervention: Maximize Cognitive Function  Recent Flowsheet Documentation  Taken 4/24/2025 2000 by Macarena Tabares, RN  Reorientation Measures: reorientation provided  Taken 4/24/2025 1600 by Macarena Tabares, RN  Reorientation Measures: reorientation provided  Taken 4/24/2025 1200 by Macarena Tabares, RN  Reorientation Measures: reorientation provided  Goal: Improved Sleep  Outcome: Not Progressing     Problem: Adult Inpatient Plan of Care  Goal: Readiness for Transition of Care  Outcome: Progressing     Problem: Seizure, Active Management  Goal: Absence of Seizure/Seizure-Related Injury  Outcome: Progressing

## 2025-04-25 NOTE — PROGRESS NOTES
CLINICAL NUTRITION SERVICES - REASSESSMENT/FOLLOW-UP     RECOMMENDATIONS FOR MDs/PROVIDERS TO ORDER:  None    Registered Dietitian Interventions:  - Continue TF regimen:   Novasource Renal @ 50 ml/hr x 22 hrs (hold for synthroid)  Provides:      2200 kcal/daily (27 kcal/kg)     100 gm/pro/daily (1.2 gm/kg)     201 g CHO     789 mL/FW  FWF orders: 30 ml every 4 hours (180 ml/daily) or per provider.  Total daily fluids: 969 ml/daily    Future/Additional Recommendations:  - Monitor labs and weights and adjust EN regimen as indicated.     Reason for Admission: Brain mass S/P craniotomy   PMH: High grade neuroendocrine tumor of left parotid gland s/p chemoradiation, Afib, T2DM, CKD, HLD, and hypothyroidism     SUBJECTIVE INFORMATION  Patient discussed during interdisciplinary rounds, chart reviewed.   - Patient remains intubated. Tolerating TF at 40 ml/hr, will meet goal at 1800 4/25 per advancement schedule.   - Patient extubated on 4/25, will monitor for SLP evaluation once patient is medically clear.     ESTIMATED NUTRITION NEEDS-updated 4/25  Admission Body Weight: 83.8 kg    Energy: 6201-7254 kcals/day (25 - 30 kcals/kg of admission wt)  Justification:  Post operative, maintenance  Protein: 100-126 grams protein/day (1.2 - 1.5 grams of pro/kg of admission wt)  Justification: Surgical recovery  Fluid:    1 mL/kcal (Per provider pending fluid status)  Justification: Per provider pending fluid status    CURRENT NUTRITION ORDERS  Diet: NPO + TF  Nutrition Support:   Enteral nutrition via NGT  Novasource Renal at 50 mL/hr intermittently, x 22 hrs  (1100 mL/day total)   - Hold x 2 hrs for synthroid   FWF of 30 mL every 4 hours (180 mL daily)    Provides (based on admission wt weight of 83.8 kg):  2200 kcals/day (26 kcals/kg)  100 g/d protein (1.2 grams/kg)  787 mL free water (from TF formula)  201 g CHO/day  0 g fiber/day    CURRENT INTAKE/TOLERANCE  Nutrition Support (average daily intakes) x 3 days: 292 mL (27% of  "prescribed volume)-while progressing to goal rate.        NEW FINDINGS  Height: 1.803 m (5' 11\")  Admission Weight: 83.8 kg (184 lb 11.2 oz) (04/22/25 0617)   IBW: 78.2 kg  Body mass index is 26.35 kg/m . Overweight BMI 25-29.9  Weight History:  11% (24 pounds) weight loss in less than 9 months(7  months)  Wt Readings from Last 5 Encounters:   04/25/25 85.7 kg (188 lb 15 oz)   04/16/25 83.9 kg (185 lb)   04/09/25 86.2 kg (190 lb)   02/17/25 86.2 kg (190 lb)   02/14/25 87.8 kg (193 lb 9.6 oz)   Additional Weights per EMR:   01/02/25 85.7 kg (189 lb)   12/27/24 83 kg (183 lb)   12/24/24 86.1 kg (189 lb 13.1 oz)   12/09/24 88.6 kg (195 lb 6.4 oz)   11/13/24 84.4 kg (186 lb)   11/04/24 78.5 kg (173 lb)  09/05/24 96.3 kg (212 lb 6.4 oz)  04/24/24 94.3 kg (208 lb)      Skin/wounds: Reviewed   Incision/Surgical Site 02/17/25 Right;Upper Chest (Active)       Incision/Surgical Site 04/22/25 Right Scalp (Active)   Incision Assessment WDL 04/25/25 0800   Dressing Xeroform 04/25/25 0800   Kendra-Incision Assessment Warm 04/25/25 0800   Closure Sutures 04/25/25 0800   Incision Drainage Amount None 04/25/25 0800   Dressing Intervention Clean, dry, intact 04/25/25 0800     GI symptoms: hypoactive bowel sounds; Last BM: 04/24/25  Nutrition-relevant medications: Reviewed*noted synthroid  Current Facility-Administered Medications   Medication Dose Route Frequency Provider Last Rate Last Admin    atorvastatin (LIPITOR) tablet 40 mg  40 mg Oral or Feeding Tube Daily Leydi Meyers MD   40 mg at 04/25/25 0802    bisacodyl (DULCOLAX) suppository 10 mg  10 mg Rectal Daily PRN Nelly Coon, NP        chlorhexidine (PERIDEX) 0.12 % solution 15 mL  15 mL Mouth/Throat Q12H Leydi Meyers MD   15 mL at 04/25/25 0802    dexAMETHasone PF (DECADRON) injection 6 mg  6 mg Intravenous Q12H Nelly Coon NP   6 mg at 04/25/25 0825    Followed by    [START ON 4/26/2025] dexAMETHasone (DECADRON) injection 4 mg  4 mg Intravenous Q12H " Nelly Coon NP        Followed by    [START ON 4/28/2025] dexAMETHasone (DECADRON) injection 4 mg  4 mg Intravenous Daily Nelly Coon NP        Followed by    [START ON 5/1/2025] dexAMETHasone (DECADRON) injection 2 mg  2 mg Intravenous Daily Nelyl Coon NP        Followed by    [START ON 5/4/2025] dexAMETHasone (DECADRON) injection 1 mg  1 mg Intravenous Daily Nelly Coon NP        dextrose 10% infusion   Intravenous Continuous PRN Leydi Meyers MD        dextrose 10% infusion   Intravenous Continuous PRN Leydi Meyers MD        glucose gel 15-30 g  15-30 g Oral Q15 Min PRN Leydi Meyers MD        Or    dextrose 50 % injection 25-50 mL  25-50 mL Intravenous Q15 Min PRN Leydi Meyers MD        Or    glucagon injection 1 mg  1 mg Subcutaneous Q15 Min PRN Leydi Meyers MD        hydrALAZINE (APRESOLINE) injection 10 mg  10 mg Intravenous Q30 Min PRN Nelly Coon NP        HYDROmorphone (PF) (DILAUDID) injection 0.3-0.5 mg  0.3-0.5 mg Intravenous Q2H PRN Leydi Meyers MD   0.5 mg at 04/25/25 0506    insulin regular (MYXREDLIN) 1 unit/mL infusion  0-24 Units/hr Intravenous Continuous Leydi Meyers MD 3 mL/hr at 04/25/25 0806 3 Units/hr at 04/25/25 0806    [Held by provider] isosorbide mononitrate (IMDUR) 24 hr tablet 30 mg  30 mg Oral Daily Leydi Meyers MD        labetalol (NORMODYNE/TRANDATE) injection 10 mg  10 mg Intravenous Q10 Min PRN Nelly Coon NP        levETIRAcetam (KEPPRA) 1,500 mg in sodium chloride 0.9 % 125 mL intermittent infusion  1,500 mg Intravenous Q12H Kami Novak  mL/hr at 04/25/25 0138 1,500 mg at 04/25/25 0138    levothyroxine (SYNTHROID/LEVOTHROID) tablet 125 mcg  125 mcg Oral or Feeding Tube Daily Leydi Meyers MD   125 mcg at 04/25/25 0802    lidocaine (LMX4) cream   Topical Q1H PRN Nelly Coon, GABRIELA        lidocaine 1 % 0.1-1 mL  0.1-1 mL Other Q1H PRN Nelly Coon, NP        LORazepam (ATIVAN) injection 2  mg  2 mg Intravenous Q5 Min PRN Fatou Brantley PA-C        magnesium hydroxide (MILK OF MAGNESIA) suspension 30 mL  30 mL Oral Daily PRN Nelly Coon NP        naloxone (NARCAN) injection 0.2 mg  0.2 mg Intravenous Q2 Min PRN Davie Erickson MD        Or    naloxone (NARCAN) injection 0.4 mg  0.4 mg Intravenous Q2 Min PRN Davie Erickson MD        Or    naloxone (NARCAN) injection 0.2 mg  0.2 mg Intramuscular Q2 Min PRN Davie Erickson MD        Or    naloxone (NARCAN) injection 0.4 mg  0.4 mg Intramuscular Q2 Min PRN Davie Erickson MD        ondansetron (ZOFRAN ODT) ODT tab 4 mg  4 mg Oral Q6H PRN Nelly Coon NP        Or    ondansetron (ZOFRAN) injection 4 mg  4 mg Intravenous Q6H PRN Nelly Coon NP        oxyCODONE (ROXICODONE) tablet 5 mg  5 mg Oral Q4H PRN Nelly Coon NP        Or    oxyCODONE (ROXICODONE) tablet 10 mg  10 mg Oral Q4H PRN Nelly Coon NP        polyethylene glycol (MIRALAX) Packet 17 g  17 g Oral or Feeding Tube Daily Nelly Coon NP   17 g at 04/25/25 0802    prochlorperazine (COMPAZINE) injection 5 mg  5 mg Intravenous Q6H PRN Nelly Coon NP        Or    prochlorperazine (COMPAZINE) tablet 5 mg  5 mg Oral Q6H PRN Nelly Coon NP        senna-docusate (SENOKOT-S/PERICOLACE) 8.6-50 MG per tablet 1 tablet  1 tablet Oral or Feeding Tube BID Nelly Coon NP   1 tablet at 04/25/25 0802    sodium chloride (PF) 0.9% PF flush 3 mL  3 mL Intracatheter Q8H BOYD Nelly Coon NP   3 mL at 04/24/25 0518    sodium chloride (PF) 0.9% PF flush 3 mL  3 mL Intracatheter q1 min prn Nelly Coon NP        sodium chloride 0.9 % infusion   Intravenous Continuous Corlin, Leydi, MD 20 mL/hr at 04/23/25 1557 New Bag at 04/23/25 1557    sodium chloride 0.9% BOLUS 250 mL  250 mL Intravenous Once Leydi Meyers MD   Rate Verify at 04/23/25 1559   Nutrition-relevant labs: Reviewed  Lab Results   Component Value Date     04/25/2025    POTASSIUM  4.1 04/25/2025     (H) 04/25/2025    BUN 30.7 (H) 04/25/2025    CR 1.01 04/25/2025    GFRESTIMATED 74 04/25/2025    STARR 9.1 04/25/2025    MAG 1.8 04/25/2025    PHOS 2.6 04/25/2025       MALNUTRITION  % Intake: Unable to assess  % Weight Loss: Unable to assess   Subcutaneous Fat Loss: Unable to assess  Muscle Loss: Unable to assess  Fluid Accumulation/Edema: None noted  Malnutrition Diagnosis: Unable to determine due to lack of information  Malnutrition Present on Admission: Unable to assess    EVALUATION OF THE PROGRESS TOWARD GOALS   Previous Goals  TF @ goal to provide % estimated needs.    Evaluation: Progressing    Previous Nutrition Diagnosis  Inadequate protein energy intake related to intubated as evidenced by no nutrition received since admission, plan for TF.   Evaluation: Improving    NUTRITION DIAGNOSIS  Inadequate oral intakes  related to  acute respiratory illness  as evidenced by NPO and enteral support.     INTERVENTIONS  See nutrition interventions above    Goals  TF to meet % of estimated nutrient needs.      Monitoring/Evaluation      Progress toward goals will be monitored and evaluated per policy.    Armand Zavala, RD, LD, MS  Float Coverage  Available on [employer]

## 2025-04-25 NOTE — PLAN OF CARE
Goal Outcome Evaluation:      Plan of Care Reviewed With: patient          Outcome Evaluation: 0700 - 1500 Pt more alert, oriented to self only. Voice hoarse. Extubated to 2L NC at 0945. Bradycardic in 40s in AM, ECG obtained. BP stable.  Good uop. Intermittent agitation, trying to get out of bed. Insulin gtt on alg 3.      Problem: Delirium  Goal: Optimal Coping  Outcome: Progressing  Intervention: Optimize Psychosocial Adjustment to Delirium  Recent Flowsheet Documentation  Taken 4/25/2025 1200 by Jeffrey Martinez RN  Supportive Measures:   positive reinforcement provided   relaxation techniques promoted  Taken 4/25/2025 0800 by Jeffrey Martinez RN  Supportive Measures:   positive reinforcement provided   relaxation techniques promoted  Goal: Improved Behavioral Control  Outcome: Progressing  Intervention: Prevent and Manage Agitation  Recent Flowsheet Documentation  Taken 4/25/2025 1200 by Jeffrey Martinez RN  Environment Familiarity/Consistency: daily routine followed  Taken 4/25/2025 0800 by Jeffrey Martinez RN  Environment Familiarity/Consistency: daily routine followed  Intervention: Minimize Safety Risk  Recent Flowsheet Documentation  Taken 4/25/2025 1200 by Jeffrey Martinez RN  Enhanced Safety Measures:   pain management   review medications for side effects with activity  Trust Relationship/Rapport:   care explained   choices provided   reassurance provided  Taken 4/25/2025 0800 by Jeffrey Martinez RN  Enhanced Safety Measures:   pain management   review medications for side effects with activity  Trust Relationship/Rapport:   care explained   choices provided   reassurance provided  Goal: Improved Attention and Thought Clarity  Outcome: Progressing  Goal: Improved Sleep  Outcome: Progressing     Problem: Adult Inpatient Plan of Care  Goal: Plan of Care Review  Description: The Plan of Care Review/Shift note should be completed every shift.  The Outcome  "Evaluation is a brief statement about your assessment that the patient is improving, declining, or no change.  This information will be displayed automatically on your shiftnote.  Outcome: Progressing  Flowsheets (Taken 4/25/2025 1512)  Outcome Evaluation: 0700 - 1500 Pt more alert, oriented to self only. Voice hoarse. Extubated to 2L NC at 0945. Bradycardic in 40s in AM, ECG obtained. BP stable.  Good uop. Intermittent agitation, trying to get out of bed. Insulin gtt on alg 3.  Plan of Care Reviewed With: patient  Goal: Patient-Specific Goal (Individualized)  Description: You can add care plan individualizations to a care plan. Examples of Individualization might be:  \"Parent requests to be called daily at 9am for status\", \"I have a hard time hearing out of my right ear\", or \"Do not touch me to wake me up as it startlesme\".  Outcome: Progressing  Goal: Absence of Hospital-Acquired Illness or Injury  Outcome: Progressing  Intervention: Identify and Manage Fall Risk  Recent Flowsheet Documentation  Taken 4/25/2025 1200 by Jeffrey Martinez, RN  Safety Promotion/Fall Prevention:   activity supervised   assistive device/personal items within reach   clutter free environment maintained   increased rounding and observation   increase visualization of patient   lighting adjusted   nonskid shoes/slippers when out of bed   patient and family education   room door open   room near nurse's station   room organization consistent   safety round/check completed  Taken 4/25/2025 0800 by Jeffrey Martinez, RN  Safety Promotion/Fall Prevention:   activity supervised   assistive device/personal items within reach   clutter free environment maintained   increased rounding and observation   increase visualization of patient   lighting adjusted   nonskid shoes/slippers when out of bed   patient and family education   room door open   room near nurse's station   room organization consistent   safety round/check " completed  Intervention: Prevent Skin Injury  Recent Flowsheet Documentation  Taken 4/25/2025 1400 by Jeffrey Martinez RN  Body Position:   right   turned   side-lying   heels elevated   legs elevated   upper extremity elevated  Taken 4/25/2025 1000 by Jeffrey Martinez RN  Body Position:   left   turned   legs elevated   heels elevated   upper extremity elevated  Taken 4/25/2025 0800 by Jeffrey Martinez RN  Body Position:   right   turned   heels elevated   legs elevated   upper extremity elevated  Intervention: Prevent and Manage VTE (Venous Thromboembolism) Risk  Recent Flowsheet Documentation  Taken 4/25/2025 1200 by Jeffrey Martinez RN  VTE Prevention/Management: SCDs on (sequential compression devices)  Taken 4/25/2025 0800 by Jeffrey Martinez RN  VTE Prevention/Management: SCDs on (sequential compression devices)  Goal: Optimal Comfort and Wellbeing  Outcome: Progressing  Intervention: Provide Person-Centered Care  Recent Flowsheet Documentation  Taken 4/25/2025 1200 by Jeffrey Martinez RN  Trust Relationship/Rapport:   care explained   choices provided   reassurance provided  Taken 4/25/2025 0800 by Jeffrey Martinez RN  Trust Relationship/Rapport:   care explained   choices provided   reassurance provided  Goal: Readiness for Transition of Care  Outcome: Progressing     Problem: Restraint, Nonviolent  Goal: Absence of Harm or Injury  Outcome: Progressing  Intervention: Implement Least Restrictive Safety Strategies  Recent Flowsheet Documentation  Taken 4/25/2025 1200 by Jeffrey Martinez RN  Medical Device Protection:   IV pole/bag removed from visual field   tubing secured  Taken 4/25/2025 0800 by Jeffrey Martinez RN  Medical Device Protection:   IV pole/bag removed from visual field   tubing secured  Intervention: Protect Dignity, Rights and Personal Wellbeing  Recent Flowsheet Documentation  Taken 4/25/2025 1200 by Jeffrey Martinez  STANISLAV RN  Trust Relationship/Rapport:   care explained   choices provided   reassurance provided  Taken 4/25/2025 0800 by Jeffrey Martinez RN  Trust Relationship/Rapport:   care explained   choices provided   reassurance provided  Intervention: Protect Skin and Joint Integrity  Recent Flowsheet Documentation  Taken 4/25/2025 1400 by Jeffrey Martinez RN  Body Position:   right   turned   side-lying   heels elevated   legs elevated   upper extremity elevated  Taken 4/25/2025 1000 by Jeffrey Martinez RN  Body Position:   left   turned   legs elevated   heels elevated   upper extremity elevated  Taken 4/25/2025 0800 by Jeffrey Martinez RN  Body Position:   right   turned   heels elevated   legs elevated   upper extremity elevated     Problem: Seizure, Active Management  Goal: Absence of Seizure/Seizure-Related Injury  Outcome: Progressing

## 2025-04-25 NOTE — PROGRESS NOTES
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 He had a seizure and cardiopulmonary arrest in PACU requiring re-intubation and CPR with ROSC. The patient was admitted to the ICU for medical management.     I have personally reviewed the daily labs, imaging studies, cultures and discussed the case with referring physician and consulting physicians.     Neuro:  Right temporal brain tumor s/p craniotomy  Cerebral vasogenic edema with midline shift  History of neuroendocrine tumor s/p chemoradiation  Post-operative seizure  - Neurosurgery and neurology following, appreciate recommendations  - Multimodal pain control  - Continue Keppra for seizures  - Steroid taper per neurosurgery  - Seizure precautions and neuro checks  - EEG ongoing     CV:  Cardiopulmonary arrest  Bradycardia  History of atrial fibrillation on chronic anticoagulation  History of CAD s/p CABG  Hyperlipidemia  - MAP goal > 65  - Holding anticoagulation, PTA Imdur  - Continue PTA statin  - Discontinue famotidine, rare reports of bradycardia  - Telemetry, EKG PRN  - Electrolyte repletion protocols     Pulm:  Respiratory arrest  - Wean PEEP/FiO2, as able  - Pressure support and possible extubation today     GI:  Risk for calorie malnutrition  - RD to manage TF  - PPI prophylaxis     Renal:  Chronic kidney disease (baseline creatinine 1.1-1.3)  - Monitor UOP, Cr, I/O  - Avoid nephrotoxic agents     ID:  No active issues      Heme:  Acute blood loss  "anemia  Thrombocytopenia  - Daily CBC ordered  - Transfuse hemoglobin <7, platelets <75  - Recheck CBC at 1400 today     Endo:  Type 2 diabetes mellitus (A1c 6.9%)  Hypothyroidism  - Insulin drip, consider long acting pending steroid taper and diet  - Goal glucose <180  - PTA levothyroxine reordered    Clinically Significant Risk Factors          # Hyperchloremia: Highest Cl = 108 mmol/L in last 2 days, will monitor as appropriate      # Hypocalcemia: Lowest Ca = 8.6 mg/dL in last 2 days, will monitor and replace as appropriate     # Hypoalbuminemia: Lowest albumin = 3.3 g/dL at 4/23/2025  5:31 PM, will monitor as appropriate     # Thrombocytopenia: Lowest platelets = 77 in last 2 days, will monitor for bleeding   # Hypertension: Noted on problem list           # DMII: A1C = 7.9 % (Ref range: <5.7 %) within past 6 months   # Overweight: Estimated body mass index is 26.35 kg/m  as calculated from the following:    Height as of this encounter: 1.803 m (5' 11\").    Weight as of this encounter: 85.7 kg (188 lb 15 oz)., PRESENT ON ADMISSION       # Financial/Environmental Concerns:                PPx:  1. DVT: mechanical only   2. VAP: HOB 30 degrees, chlorhexidine rinse  3. Stress Ulcer: PPI  4. Restraints: Nonviolent soft two point restraints required and necessary for patient safety and continued cares and good effect as patient continues to pull at necessary lines, tubes despite education and distraction. Will readdress daily.   5. Wound care - per unit routine   6. Feeding - TF  7. Family to be updated via phone    Unable to obtain ROS 2/2 sedation/intubation.     BP 97/46 (BP Location: Left arm)   Pulse (!) 46   Temp 97.8  F (36.6  C) (Axillary)   Resp 16   Ht 1.803 m (5' 11\")   Wt 85.7 kg (188 lb 15 oz)   SpO2 96%   BMI 26.35 kg/m    Gen: supine, NAD  Neuro: follows simple commands, opens eyes, moves all extremities  HEENT: anicteric  Card: RRR  Pulm: clear b/l  Abd: soft, non-distended  MSK: no edema, no " acute joint abnormality  Skin: no obvious rash    FiO2 (%): 30 %, Resp: 16, Vent Mode: VC/AC, Resp Rate (Set): 16 breaths/min, Tidal Volume (Set, mL): 550 mL, PEEP (cm H2O): 5 cmH2O, Pressure Support (cm H2O): 5 cmH2O (Changed by RT at bedside), Resp Rate (Set): 16 breaths/min, Tidal Volume (Set, mL): 550 mL, PEEP (cm H2O): 5 cmH2O      Intake/Output Summary (Last 24 hours) at 4/25/2025 0811  Last data filed at 4/25/2025 0600  Gross per 24 hour   Intake 1165.16 ml   Output 1075 ml   Net 90.16 ml     CBC RESULTS:   Recent Labs   Lab Test 04/25/25  0329   WBC 6.9   RBC 3.14*   HGB 9.6*   HCT 29.0*   MCV 92   MCH 30.6   MCHC 33.1   RDW 14.7   PLT 77*     Lab Results   Component Value Date     04/25/2025    POTASSIUM 4.1 04/25/2025    CHLORIDE 108 (H) 04/25/2025    CO2 21 (L) 04/25/2025    ANIONGAP 9 04/25/2025     (H) 04/25/2025    BUN 30.7 (H) 04/25/2025    CR 1.01 04/25/2025    GFRESTIMATED 74 04/25/2025    STARR 9.1 04/25/2025       Discussed with staff, Dr. Anne.    Leydi Meyers MD  Surgical Critical Care Fellow

## 2025-04-25 NOTE — CONSULTS
Care Management Initial Consult    General Information  Assessment completed with: Patient,  (Harpreet)  Type of CM/SW Visit: Initial Assessment    Primary Care Provider verified and updated as needed: Yes   Readmission within the last 30 days: current reason for admission unrelated to previous admission      Reason for Consult: discharge planning  Advance Care Planning:            Communication Assessment  Patient's communication style: spoken language (English or Bilingual)    Hearing Difficulty or Deaf: yes   Wear Glasses or Blind: yes (son has)    Cognitive  Cognitive/Neuro/Behavioral: .WDL except  Level of Consciousness: confused, lethargic  Arousal Level: arouses to voice  Orientation:  (ANABELL - ETT)  Mood/Behavior: calm, cooperative  Best Language:  (ANABELL-intubated/ETT)  Speech: endotracheal tube    Living Environment:   People in home: child(sharita), adult   (Harpreet)  Current living Arrangements: apartment      Able to return to prior arrangements: no  Living Arrangement Comments:  (son was planning to move out but that plan is on hold)    Family/Social Support:  Care provided by: self, child(sharita)  Provides care for: no one  Marital Status:   Support system: Children          Description of Support System: Supportive, Involved    Support Assessment: Adequate family and caregiver support    Current Resources:   Patient receiving home care services: No        Community Resources: None  Equipment currently used at home: cane, straight  Supplies currently used at home:      Employment/Financial:  Employment Status: retired        Financial Concerns:     Referral to Financial Worker: No       Does the patient's insurance plan have a 3 day qualifying hospital stay waiver?  Yes     Which insurance plan 3 day waiver is available? Alternative insurance waiver    Will the waiver be used for post-acute placement? Undetermined at this time    Lifestyle & Psychosocial Needs:  Social Drivers of Health     Food Insecurity:  Unknown (4/22/2025)    Food Insecurity     Within the past 12 months, did you worry that your food would run out before you got money to buy more?: Patient unable to answer     Within the past 12 months, did the food you bought just not last and you didn t have money to get more?: Patient unable to answer   Depression: Not at risk (2/14/2025)    PHQ-2     PHQ-2 Score: 0   Housing Stability: Unknown (4/22/2025)    Housing Stability     Do you have housing? : Patient unable to answer     Are you worried about losing your housing?: Patient unable to answer   Tobacco Use: Medium Risk (2/17/2025)    Patient History     Smoking Tobacco Use: Former     Smokeless Tobacco Use: Never     Passive Exposure: Not on file   Financial Resource Strain: Unknown (4/22/2025)    Financial Resource Strain     Within the past 12 months, have you or your family members you live with been unable to get utilities (heat, electricity) when it was really needed?: Patient unable to answer   Alcohol Use: Not on file   Transportation Needs: Unknown (4/22/2025)    Transportation Needs     Within the past 12 months, has lack of transportation kept you from medical appointments, getting your medicines, non-medical meetings or appointments, work, or from getting things that you need?: Patient unable to answer   Physical Activity: Insufficiently Active (2/9/2025)    Exercise Vital Sign     Days of Exercise per Week: 7 days     Minutes of Exercise per Session: 20 min   Interpersonal Safety: Low Risk  (4/22/2025)    Interpersonal Safety     Do you feel physically and emotionally safe where you currently live?: Yes     Within the past 12 months, have you been hit, slapped, kicked or otherwise physically hurt by someone?: No     Within the past 12 months, have you been humiliated or emotionally abused in other ways by your partner or ex-partner?: No   Stress: No Stress Concern Present (2/9/2025)    [employer] - Occupational  Stress Questionnaire     Feeling of Stress : Not at all   Social Connections: Unknown (2/9/2025)    Social Connection and Isolation Panel [NHANES]     Frequency of Communication with Friends and Family: Not on file     Frequency of Social Gatherings with Friends and Family: Three times a week     Attends Hoahaoism Services: Not on file     Active Member of Clubs or Organizations: Not on file     Attends Club or Organization Meetings: Not on file     Marital Status: Not on file   Health Literacy: Not on file       Functional Status:  Prior to admission patient needed assistance:   Dependent ADLs:: Ambulation-walker, Ambulation-cane  Dependent IADLs:: Cleaning, Cooking, Laundry, Shopping, Transportation       Mental Health Status:          Chemical Dependency Status:                Values/Beliefs:  Spiritual, Cultural Beliefs, Hoahaoism Practices, Values that affect care:                 Discussed  Partnership in Safe Discharge Planning  document with patient/family: No    Additional Information:  Consult for discharge planning.     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     Writer reviewed chart and recommendations at discharge. Writer called patients son Harpreet and introduced self and role. Writer confirmed patient's primary doctor and home address as accurate. Harpreet reports patient was getting around the apartment with a walker or cane, harpreet and his sister did most of the cooking, cleaning and shopping. Harpreet was planning on moving out and understands that patient may not be able to live independently going forward.     Writer explained discharge to TCU when/if patient is medically  ready. Writer provided phone number for other siblings to call with questions.      Next Steps: Follow for any discharge planning needs     JAKE Taylor

## 2025-04-25 NOTE — PROGRESS NOTES
[facility]    Vascular Neurology Progress Note    Interval History     BP: 97/46  HR:  40-44  B  Na: 138 today  Temp: 97.8 today  WBC: 6.9 today    Extubated this morning, up in chair, alert/oriented to self, eyes midline, continues to have left facial droop, able to follow all commands, has some weakness in left arm, bilateral legs are essentially equal. Will discontinued EEG today.     Hospital Course     Chief complaint: No chief complaint on file.    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 MRI showed large right temporal lobe mass and associated hemorrhage. He was discharged on  and returned on  for right temporal craniotomy. Postop he had witnessed seizures and cardiopulmonary arrest. Initial EEG shows right focal slowing and right epileptogenic discharges. Dayday was continued on current Keppra regimen with continuous EEG monitoring for possible epileptic activities.     Assessment and Plan     # Large right anterior temporal lobe metastatic mass excision with associated hemorrhage and cerebral edema s/p right frontotemporal craniotomy. Repeat CT on  did not show significant intracranial changes.      #Postoperative cardiopulmonary arrest s/p CPR (epi x 2) with ROSC     # Seizures 2/2 above     # Atrial fibrillation (not currently anticoagulated/ stopped due to scheduled craniotomy)      DVT Prophylaxis:  SCDs, hold pharmacologic VTE prophylaxis until cleared by neurosurgery      Recommendations   - Neuro checks and vital signs every 1 hour  - Appreciate neurosurgery recommendations  - Continue SBP < 140   - Elevate head of bed 30 degrees  - Continue maintenance Keppra 1.5 g  "every 12 hours following  - Seizure precautions, discontinued continuous video EEG today.  - PRN IV Ativan 2 mg q 5 minutes if clinical seizure activity noted and lasts longer than 2 minutes, STAT page to NCC team  - Hold any anticoagulation/antiplatelets/NSAIDs until cleared by neurosurgery  - Recommend euthermia, euglycemia (recommend BG <180), eunatremia     Patient Follow-up    - final recommendation pending work-up    We will continue to follow.       JAKI Arndt CNP  Vascular Neurology    To page me or covering stroke neurology team member, click here: [facility]  Choose \"On Call\" tab at top, then select \"NEUROLOGY/ALL SITES\" from middle drop-down box, press Enter, then look for \"stroke\" or \"telestroke\" for your site.    Physical Examination     Temp: 97.8  F (36.6  C) Temp src: Axillary BP: 97/46 Pulse: (!) 46   Resp: 16 SpO2: 96 % O2 Device: Mechanical Ventilator      Neurologic  Mental Status:  Up in chair, alert and oriented to self, follows commands appropriately  Cranial Nerves: Possible left field cut vs neglect? Left facial droop. Eyes midline, EOMI with normal smooth pursuit, facial sensation intact and symmetric, hearing not formally tested but intact to conversation, palate elevation symmetric and uvula midline, tongue protrusion midline  Motor: Drift in bilateral upper extremities (left weaker than right), bilateral legs equal in strength (unable to assess drift due to being in a chair).  Reflexes: Deferred  Sensory:  light touch sensation intact and symmetric throughout upper and lower extremities, no extinction on double simultaneous stimulation   Coordination:  Deferred  Station/Gait:  deferred    Imaging/Labs   (Bolded imaging and labs new and/or personally reviewed or re-reviewed by me today)    MRI/Head CT CT head 4/16: Large right anterior temporal lobe mass 5.3 x 4 x 3.8 cm and associated hemorrhage posterior to mass. Significant vasogenic edema with right to left midline shift 6.6 mm.    " "  CT head 4/22: Interval right frontopariteal and temporal cranioomy. Small hemorrhage and gas cavity. Stable vasogenic edema. Persistent mild effacement of right lateral ventricle.      CT head 4/23: Stable postop right craniotomy with small blood and bluid along margin. Unchanged mass in right temporal lob and vasogenic edema, and unchanged mass effect including trace leftward midline shift.      MRI brain 4/16: Large mass right temporal lobe with vasogenic edema measures 5.3x3.8x4.3. Right to left midline shift 4 to 5 mm.      MRI brain 4/23: Postop changes related to right pterional craniotomy in anterior right temporal lobe. 4.6 x 2.8 x 3 cm area may represent residual tumor. Slightly diminished edema in right temporal/frontal area. 4 mm right to left midline shift.      Intracranial Vasculature CTA head 4/17: NO LVO. Plaque in bilateral carotid siphons. Unchanged 6 mm leftward midline shift. Anterior displacement of right MCA due to right mass.    Cervical Vasculature CTA neck 4/17: Plaque in bilateral carotid bifurcation       Echocardiogram N/A   EKG/Telemetry Atrial fibrillation with LBBB    Additional Testing EEG Day 1: Showed electroencephalogram is abnormal due to the presences of background slow activity suggesting moderate encephalopathy and focal slowing in the right temporal region. No electrographic seizures or epileptiform discharges were recorded. There were two events of stimulus induced rhythmic periodic discharges that lacked the criteria to be diagnosed as seizure\"     EEG Day 2: Video electroencephalogram is abnormal due to the presences of background slow activity suggesting moderate encephalopathy and focal slowing in the right temporal region. No electrographic seizures or epileptiform discharges were recorded.       LDL  No lab value available in past 30 days   A1C  2/10/2025: 6.9 %  4/23/2025: 7.9 %   Troponin 4/22/2025: 53 ng/L     Other labs reviewed by me today:  WBC, Platelets, Na, " blood glucose    Time Spent on this Encounter   Billing: I personally examined and evaluated the patient today. At the time of my evaluation and management the patient was in critical condition today due to right fronto-temporal craniotomy. I personally managed chart/imaging review. Key decisions made today included discontinued EEG. I spent a total of 45 minutes providing critical care services, evaluating the patient, directing care and reviewing laboratory values and radiologic reports.

## 2025-04-25 NOTE — PLAN OF CARE
Goal Outcome Evaluation:      Plan of Care Reviewed With: patient    Overall Patient Progress: no changeOverall Patient Progress: no change    Assumed care from [id number]: Patient able to follow some commands at times, neuro's unchanged, moving all extremities. Vented and not sedated. Dilaudid PRN given for restlessness. Patient bradycardic dropping to 29. MD aware and EKG showed A.Fib with slow ventricular response, labs ordered pending results. Duran patent and adequate urine output. TF @ 40 with goal of 50ml/hr.       Problem: Delirium  Goal: Optimal Coping  Intervention: Optimize Psychosocial Adjustment to Delirium  Recent Flowsheet Documentation  Taken 4/25/2025 0400 by Amy Malcolm RN  Supportive Measures:   positive reinforcement provided   relaxation techniques promoted  Family/Support System Care: support provided  Taken 4/25/2025 0000 by Amy Malcolm RN  Supportive Measures:   positive reinforcement provided   relaxation techniques promoted  Family/Support System Care: support provided     Problem: Delirium  Goal: Improved Behavioral Control  Intervention: Minimize Safety Risk  Recent Flowsheet Documentation  Taken 4/25/2025 0400 by Amy Malcolm RN  Enhanced Safety Measures:   pain management   review medications for side effects with activity  Trust Relationship/Rapport:   care explained   reassurance provided  Taken 4/25/2025 0000 by Amy Malcolm RN  Enhanced Safety Measures:   pain management   review medications for side effects with activity  Trust Relationship/Rapport:   care explained   reassurance provided     Problem: Adult Inpatient Plan of Care  Goal: Absence of Hospital-Acquired Illness or Injury  Intervention: Identify and Manage Fall Risk  Recent Flowsheet Documentation  Taken 4/25/2025 0400 by Amy Malcolm RN  Safety Promotion/Fall Prevention:   activity supervised   assistive device/personal items within reach   clutter free environment  maintained   increased rounding and observation   increase visualization of patient   lighting adjusted   nonskid shoes/slippers when out of bed   patient and family education   room door open   room near nurse's station   room organization consistent   safety round/check completed  Taken 4/25/2025 0000 by Amy Malcolm RN  Safety Promotion/Fall Prevention:   activity supervised   assistive device/personal items within reach   clutter free environment maintained   increased rounding and observation   increase visualization of patient   lighting adjusted   nonskid shoes/slippers when out of bed   patient and family education   room door open   room near nurse's station   room organization consistent   safety round/check completed

## 2025-04-25 NOTE — PROGRESS NOTES
Patient has been fairly anxious, but redirectable. He has needed to be changed 4 times in the 4 hours I have had him with liquid, brown stool. He is incontinent. He has not complained of pain. Neuros have been fairly normal except for garbled speech, left facial droop, left tongue deviation, and confusion. Lungs are clear. Tube feeds have been set to goal rate of 50 @ 18:00. He is sating fine on room air as of now. Insulin drip is on algorithm 3. Incision was cleaned, sutures are intact, and the approximated margins are absent of drainage.

## 2025-04-25 NOTE — PROGRESS NOTES
Extubation Note    Successful completion of SBT:  Yes  Pt was on PS/CPAP 5/5 30% for over an hour, tolerated well.  Extubation time:0945    Patient assessment:  Lung sounds:clear  Stridor Present: No   Patient tolerance: yes    Oxygen device:  Liter flow:2  SpO2:94%    Plan: Will continue to monitor       RT Macy on 4/25/2025 at 9:53 AM

## 2025-04-26 ENCOUNTER — APPOINTMENT (OUTPATIENT)
Dept: PHYSICAL THERAPY | Facility: CLINIC | Age: 85
DRG: 025 | End: 2025-04-26
Attending: NEUROLOGICAL SURGERY
Payer: COMMERCIAL

## 2025-04-26 ENCOUNTER — APPOINTMENT (OUTPATIENT)
Dept: OCCUPATIONAL THERAPY | Facility: CLINIC | Age: 85
DRG: 025 | End: 2025-04-26
Attending: NEUROLOGICAL SURGERY
Payer: COMMERCIAL

## 2025-04-26 ENCOUNTER — APPOINTMENT (OUTPATIENT)
Dept: GENERAL RADIOLOGY | Facility: CLINIC | Age: 85
DRG: 025 | End: 2025-04-26
Attending: SURGERY
Payer: COMMERCIAL

## 2025-04-26 LAB
ANION GAP SERPL CALCULATED.3IONS-SCNC: 12 MMOL/L (ref 7–15)
ATRIAL RATE - MUSE: 54 BPM
BUN SERPL-MCNC: 26.8 MG/DL (ref 8–23)
CA-I BLD-MCNC: 5.3 MG/DL (ref 4.4–5.2)
CALCIUM SERPL-MCNC: 9.3 MG/DL (ref 8.8–10.4)
CHLORIDE SERPL-SCNC: 104 MMOL/L (ref 98–107)
CREAT SERPL-MCNC: 0.83 MG/DL (ref 0.67–1.17)
DIASTOLIC BLOOD PRESSURE - MUSE: NORMAL MMHG
EGFRCR SERPLBLD CKD-EPI 2021: 87 ML/MIN/1.73M2
ERYTHROCYTE [DISTWIDTH] IN BLOOD BY AUTOMATED COUNT: 14.7 % (ref 10–15)
GLUCOSE BLDC GLUCOMTR-MCNC: 105 MG/DL (ref 70–99)
GLUCOSE BLDC GLUCOMTR-MCNC: 111 MG/DL (ref 70–99)
GLUCOSE BLDC GLUCOMTR-MCNC: 125 MG/DL (ref 70–99)
GLUCOSE BLDC GLUCOMTR-MCNC: 128 MG/DL (ref 70–99)
GLUCOSE BLDC GLUCOMTR-MCNC: 133 MG/DL (ref 70–99)
GLUCOSE BLDC GLUCOMTR-MCNC: 137 MG/DL (ref 70–99)
GLUCOSE BLDC GLUCOMTR-MCNC: 138 MG/DL (ref 70–99)
GLUCOSE BLDC GLUCOMTR-MCNC: 143 MG/DL (ref 70–99)
GLUCOSE BLDC GLUCOMTR-MCNC: 144 MG/DL (ref 70–99)
GLUCOSE BLDC GLUCOMTR-MCNC: 147 MG/DL (ref 70–99)
GLUCOSE BLDC GLUCOMTR-MCNC: 166 MG/DL (ref 70–99)
GLUCOSE BLDC GLUCOMTR-MCNC: 167 MG/DL (ref 70–99)
GLUCOSE BLDC GLUCOMTR-MCNC: 173 MG/DL (ref 70–99)
GLUCOSE BLDC GLUCOMTR-MCNC: 209 MG/DL (ref 70–99)
GLUCOSE BLDC GLUCOMTR-MCNC: 224 MG/DL (ref 70–99)
GLUCOSE BLDC GLUCOMTR-MCNC: 263 MG/DL (ref 70–99)
GLUCOSE BLDC GLUCOMTR-MCNC: 59 MG/DL (ref 70–99)
GLUCOSE SERPL-MCNC: 151 MG/DL (ref 70–99)
HCO3 SERPL-SCNC: 23 MMOL/L (ref 22–29)
HCT VFR BLD AUTO: 28.1 % (ref 40–53)
HGB BLD-MCNC: 9.2 G/DL (ref 13.3–17.7)
HOLD SPECIMEN: NORMAL
INTERPRETATION ECG - MUSE: NORMAL
MAGNESIUM SERPL-MCNC: 1.7 MG/DL (ref 1.7–2.3)
MCH RBC QN AUTO: 30 PG (ref 26.5–33)
MCHC RBC AUTO-ENTMCNC: 32.7 G/DL (ref 31.5–36.5)
MCV RBC AUTO: 92 FL (ref 78–100)
P AXIS - MUSE: NORMAL DEGREES
PHOSPHATE SERPL-MCNC: 2.9 MG/DL (ref 2.5–4.5)
PLATELET # BLD AUTO: 86 10E3/UL (ref 150–450)
POTASSIUM SERPL-SCNC: 4.1 MMOL/L (ref 3.4–5.3)
PR INTERVAL - MUSE: NORMAL MS
QRS DURATION - MUSE: 134 MS
QT - MUSE: 496 MS
QTC - MUSE: 470 MS
R AXIS - MUSE: -80 DEGREES
RBC # BLD AUTO: 3.07 10E6/UL (ref 4.4–5.9)
SODIUM SERPL-SCNC: 139 MMOL/L (ref 135–145)
SYSTOLIC BLOOD PRESSURE - MUSE: NORMAL MMHG
T AXIS - MUSE: 258 DEGREES
VENTRICULAR RATE- MUSE: 54 BPM
WBC # BLD AUTO: 7.3 10E3/UL (ref 4–11)

## 2025-04-26 PROCEDURE — 258N000001 HC RX 258: Performed by: STUDENT IN AN ORGANIZED HEALTH CARE EDUCATION/TRAINING PROGRAM

## 2025-04-26 PROCEDURE — 97535 SELF CARE MNGMENT TRAINING: CPT | Mod: GO | Performed by: OCCUPATIONAL THERAPIST

## 2025-04-26 PROCEDURE — 99291 CRITICAL CARE FIRST HOUR: CPT | Mod: 24 | Performed by: NURSE PRACTITIONER

## 2025-04-26 PROCEDURE — 250N000013 HC RX MED GY IP 250 OP 250 PS 637: Performed by: STUDENT IN AN ORGANIZED HEALTH CARE EDUCATION/TRAINING PROGRAM

## 2025-04-26 PROCEDURE — 82374 ASSAY BLOOD CARBON DIOXIDE: CPT | Performed by: STUDENT IN AN ORGANIZED HEALTH CARE EDUCATION/TRAINING PROGRAM

## 2025-04-26 PROCEDURE — 250N000009 HC RX 250: Performed by: STUDENT IN AN ORGANIZED HEALTH CARE EDUCATION/TRAINING PROGRAM

## 2025-04-26 PROCEDURE — 97530 THERAPEUTIC ACTIVITIES: CPT | Mod: GO | Performed by: OCCUPATIONAL THERAPIST

## 2025-04-26 PROCEDURE — 97110 THERAPEUTIC EXERCISES: CPT | Mod: GO | Performed by: OCCUPATIONAL THERAPIST

## 2025-04-26 PROCEDURE — 85027 COMPLETE CBC AUTOMATED: CPT | Performed by: STUDENT IN AN ORGANIZED HEALTH CARE EDUCATION/TRAINING PROGRAM

## 2025-04-26 PROCEDURE — 97530 THERAPEUTIC ACTIVITIES: CPT | Mod: GP | Performed by: PHYSICAL THERAPIST

## 2025-04-26 PROCEDURE — 250N000011 HC RX IP 250 OP 636: Mod: JZ | Performed by: STUDENT IN AN ORGANIZED HEALTH CARE EDUCATION/TRAINING PROGRAM

## 2025-04-26 PROCEDURE — 83735 ASSAY OF MAGNESIUM: CPT | Performed by: STUDENT IN AN ORGANIZED HEALTH CARE EDUCATION/TRAINING PROGRAM

## 2025-04-26 PROCEDURE — 250N000011 HC RX IP 250 OP 636: Performed by: NURSE PRACTITIONER

## 2025-04-26 PROCEDURE — 250N000013 HC RX MED GY IP 250 OP 250 PS 637: Performed by: NURSE PRACTITIONER

## 2025-04-26 PROCEDURE — 84100 ASSAY OF PHOSPHORUS: CPT | Performed by: STUDENT IN AN ORGANIZED HEALTH CARE EDUCATION/TRAINING PROGRAM

## 2025-04-26 PROCEDURE — 36415 COLL VENOUS BLD VENIPUNCTURE: CPT | Performed by: STUDENT IN AN ORGANIZED HEALTH CARE EDUCATION/TRAINING PROGRAM

## 2025-04-26 PROCEDURE — 200N000001 HC R&B ICU

## 2025-04-26 PROCEDURE — 82330 ASSAY OF CALCIUM: CPT | Performed by: STUDENT IN AN ORGANIZED HEALTH CARE EDUCATION/TRAINING PROGRAM

## 2025-04-26 PROCEDURE — 99291 CRITICAL CARE FIRST HOUR: CPT | Mod: 24 | Performed by: ANESTHESIOLOGY

## 2025-04-26 PROCEDURE — 999N000065 XR ABDOMEN PORT 1 VIEW

## 2025-04-26 RX ADMIN — LEVETIRACETAM 1500 MG: 100 SOLUTION ORAL at 15:19

## 2025-04-26 RX ADMIN — DEXAMETHASONE SODIUM PHOSPHATE 4 MG: 4 INJECTION, SOLUTION INTRAMUSCULAR; INTRAVENOUS at 20:46

## 2025-04-26 RX ADMIN — OXYCODONE 10 MG: 5 TABLET ORAL at 22:28

## 2025-04-26 RX ADMIN — DEXTROSE MONOHYDRATE 25 ML: 25 INJECTION, SOLUTION INTRAVENOUS at 09:03

## 2025-04-26 RX ADMIN — DEXAMETHASONE SODIUM PHOSPHATE 6 MG: 10 INJECTION, SOLUTION INTRAMUSCULAR; INTRAVENOUS at 09:08

## 2025-04-26 RX ADMIN — INSULIN HUMAN 6 UNITS/HR: 1 INJECTION, SOLUTION INTRAVENOUS at 01:58

## 2025-04-26 RX ADMIN — PROCHLORPERAZINE EDISYLATE 5 MG: 5 INJECTION INTRAMUSCULAR; INTRAVENOUS at 22:28

## 2025-04-26 RX ADMIN — SENNOSIDES AND DOCUSATE SODIUM 1 TABLET: 50; 8.6 TABLET ORAL at 09:10

## 2025-04-26 RX ADMIN — LEVOTHYROXINE SODIUM 125 MCG: 125 TABLET ORAL at 09:10

## 2025-04-26 RX ADMIN — HYDROMORPHONE HYDROCHLORIDE 0.5 MG: 1 INJECTION, SOLUTION INTRAMUSCULAR; INTRAVENOUS; SUBCUTANEOUS at 01:55

## 2025-04-26 RX ADMIN — LEVETIRACETAM 1500 MG: 100 SOLUTION ORAL at 03:11

## 2025-04-26 RX ADMIN — ATORVASTATIN CALCIUM 40 MG: 40 TABLET, FILM COATED ORAL at 09:10

## 2025-04-26 RX ADMIN — OXYCODONE HYDROCHLORIDE 5 MG: 5 TABLET ORAL at 16:04

## 2025-04-26 RX ADMIN — INSULIN HUMAN 5 UNITS/HR: 1 INJECTION, SOLUTION INTRAVENOUS at 20:53

## 2025-04-26 ASSESSMENT — ACTIVITIES OF DAILY LIVING (ADL)
ADLS_ACUITY_SCORE: 65
ADLS_ACUITY_SCORE: 65
ADLS_ACUITY_SCORE: 61
ADLS_ACUITY_SCORE: 55
ADLS_ACUITY_SCORE: 61
ADLS_ACUITY_SCORE: 55
ADLS_ACUITY_SCORE: 56
ADLS_ACUITY_SCORE: 59
ADLS_ACUITY_SCORE: 56
ADLS_ACUITY_SCORE: 56
ADLS_ACUITY_SCORE: 59
ADLS_ACUITY_SCORE: 55
ADLS_ACUITY_SCORE: 61
ADLS_ACUITY_SCORE: 65
ADLS_ACUITY_SCORE: 61
ADLS_ACUITY_SCORE: 55
ADLS_ACUITY_SCORE: 55
ADLS_ACUITY_SCORE: 65
ADLS_ACUITY_SCORE: 55
ADLS_ACUITY_SCORE: 55
ADLS_ACUITY_SCORE: 65

## 2025-04-26 NOTE — PLAN OF CARE
Problem: Delirium  Goal: Optimal Coping  Outcome: Not Progressing  Intervention: Optimize Psychosocial Adjustment to Delirium  Recent Flowsheet Documentation  Taken 4/26/2025 1600 by Danie Olivas RN  Supportive Measures: positive reinforcement provided  Family/Support System Care: support provided  Taken 4/26/2025 1400 by Danie Olivas RN  Supportive Measures: positive reinforcement provided  Family/Support System Care: support provided  Taken 4/26/2025 1200 by Danie Olivas RN  Supportive Measures: positive reinforcement provided  Family/Support System Care: support provided  Taken 4/26/2025 1000 by Danie Olivas RN  Supportive Measures: positive reinforcement provided  Family/Support System Care: support provided  Taken 4/26/2025 0800 by Danie Olivas RN  Supportive Measures: positive reinforcement provided  Family/Support System Care: support provided  Goal: Improved Behavioral Control  Outcome: Not Progressing  Intervention: Prevent and Manage Agitation  Recent Flowsheet Documentation  Taken 4/26/2025 1600 by Danie Olivas RN  Environment Familiarity/Consistency: daily routine followed  Taken 4/26/2025 1400 by Danie Olivas RN  Environment Familiarity/Consistency: daily routine followed  Taken 4/26/2025 1200 by Danie Olivas RN  Environment Familiarity/Consistency: daily routine followed  Taken 4/26/2025 1000 by Danie Olivas RN  Environment Familiarity/Consistency: daily routine followed  Taken 4/26/2025 0800 by Danie Olivas RN  Environment Familiarity/Consistency: daily routine followed  Intervention: Minimize Safety Risk  Recent Flowsheet Documentation  Taken 4/26/2025 1600 by Danie Olivas, RN  Enhanced Safety Measures:   assistive devices when indicated   pain management   review medications for side effects with activity   room near unit station    at bedside  Trust Relationship/Rapport:   care explained   choices provided    questions answered   questions encouraged   reassurance provided  Taken 4/26/2025 1400 by Danie Olivas, ROLAND  Trust Relationship/Rapport:   care explained   choices provided   questions answered   questions encouraged   reassurance provided  Taken 4/26/2025 1200 by Danie Olivas, RN  Enhanced Safety Measures:   assistive devices when indicated   pain management   review medications for side effects with activity   room near unit station    at bedside  Trust Relationship/Rapport:   care explained   choices provided   questions answered   questions encouraged   reassurance provided  Taken 4/26/2025 1000 by Danie Olivas RN  Trust Relationship/Rapport:   care explained   choices provided   questions answered   questions encouraged   reassurance provided  Taken 4/26/2025 0800 by Danie Olivas, RN  Enhanced Safety Measures:   assistive devices when indicated   pain management   review medications for side effects with activity   room near unit station    at bedside  Trust Relationship/Rapport:   care explained   choices provided   questions answered   questions encouraged   reassurance provided  Goal: Improved Attention and Thought Clarity  Outcome: Not Progressing  Intervention: Maximize Cognitive Function  Recent Flowsheet Documentation  Taken 4/26/2025 1600 by Danie Olivas, RN  Sensory Stimulation Regulation: auditory stimulation provided  Reorientation Measures:   calendar in view   clock in view  Taken 4/26/2025 1400 by Danie Olivas, RN  Sensory Stimulation Regulation: auditory stimulation provided  Reorientation Measures:   calendar in view   clock in view  Taken 4/26/2025 1200 by Danie Olivas RN  Sensory Stimulation Regulation: auditory stimulation provided  Reorientation Measures:   calendar in view   clock in view  Taken 4/26/2025 1000 by Danie Olivas RN  Sensory Stimulation Regulation: auditory stimulation provided  Reorientation Measures:    "calendar in view   clock in view  Taken 4/26/2025 0800 by Danie Olivas, RN  Sensory Stimulation Regulation: auditory stimulation provided  Reorientation Measures:   calendar in view   clock in view  Goal: Improved Sleep  Outcome: Not Progressing     Problem: Adult Inpatient Plan of Care  Goal: Plan of Care Review  Description: The Plan of Care Review/Shift note should be completed every shift.  The Outcome Evaluation is a brief statement about your assessment that the patient is improving, declining, or no change.  This information will be displayed automatically on your shiftnote.  Outcome: Not Progressing  Flowsheets (Taken 4/26/2025 1819)  Outcome Evaluation: Patient has been kept from pulling at lines today. sitter has been in the room. grider was pulled today, and still waiting for him to urinate. male primofit has been placed and is patent. oxy given once today for incisional pain. Patient is still in algorithm 4+ on the insulin drip. this AM his BG was 56 and D50 was given. patient did get up and pivot to the commode today for a BM. still fairly loose. tube feed is running at goal rate. the replacement NG is in the stomach, and it is flushing well.  Plan of Care Reviewed With:   patient   family  Overall Patient Progress: no change  Goal: Patient-Specific Goal (Individualized)  Description: You can add care plan individualizations to a care plan. Examples of Individualization might be:  \"Parent requests to be called daily at 9am for status\", \"I have a hard time hearing out of my right ear\", or \"Do not touch me to wake me up as it startlesme\".  Outcome: Not Progressing  Goal: Absence of Hospital-Acquired Illness or Injury  Outcome: Not Progressing  Intervention: Identify and Manage Fall Risk  Recent Flowsheet Documentation  Taken 4/26/2025 1600 by Danie Olivas, RN  Safety Promotion/Fall Prevention:   activity supervised   assistive device/personal items within reach   bedside attendant   clutter free " environment maintained   increase visualization of patient   nonskid shoes/slippers when out of bed   patient and family education   room near nurse's station   room organization consistent   safety round/check completed   supervised activity  Taken 4/26/2025 1200 by Danie Olivas RN  Safety Promotion/Fall Prevention:   activity supervised   assistive device/personal items within reach   bedside attendant   clutter free environment maintained   increase visualization of patient   nonskid shoes/slippers when out of bed   patient and family education   room near nurse's station   room organization consistent   safety round/check completed   supervised activity  Taken 4/26/2025 0800 by Danie Olivas RN  Safety Promotion/Fall Prevention:   activity supervised   assistive device/personal items within reach   bedside attendant   clutter free environment maintained   increase visualization of patient   nonskid shoes/slippers when out of bed   patient and family education   room near nurse's station   room organization consistent   safety round/check completed   supervised activity  Intervention: Prevent Skin Injury  Recent Flowsheet Documentation  Taken 4/26/2025 1600 by Danie Olivas, RN  Body Position: weight shifting  Taken 4/26/2025 0800 by Danie Olivas RN  Body Position: weight shifting  Intervention: Prevent and Manage VTE (Venous Thromboembolism) Risk  Recent Flowsheet Documentation  Taken 4/26/2025 1600 by Danie Olivas RN  VTE Prevention/Management: SCDs on (sequential compression devices)  Taken 4/26/2025 1200 by Danie Olivas RN  VTE Prevention/Management: SCDs on (sequential compression devices)  Taken 4/26/2025 0800 by Danie Olivas RN  VTE Prevention/Management: SCDs on (sequential compression devices)  Intervention: Prevent Infection  Recent Flowsheet Documentation  Taken 4/26/2025 1600 by Danie Olivas RN  Infection Prevention:   single patient room provided   hand  hygiene promoted  Taken 4/26/2025 1200 by Danie Olivas, RN  Infection Prevention:   single patient room provided   hand hygiene promoted  Taken 4/26/2025 0800 by Danie Olivas RN  Infection Prevention:   single patient room provided   hand hygiene promoted  Goal: Optimal Comfort and Wellbeing  Outcome: Not Progressing  Intervention: Provide Person-Centered Care  Recent Flowsheet Documentation  Taken 4/26/2025 1600 by Danie Olivas RN  Trust Relationship/Rapport:   care explained   choices provided   questions answered   questions encouraged   reassurance provided  Taken 4/26/2025 1400 by Danie Olivas RN  Trust Relationship/Rapport:   care explained   choices provided   questions answered   questions encouraged   reassurance provided  Taken 4/26/2025 1200 by Danie Olivas RN  Trust Relationship/Rapport:   care explained   choices provided   questions answered   questions encouraged   reassurance provided  Taken 4/26/2025 1000 by Danie Olivas RN  Trust Relationship/Rapport:   care explained   choices provided   questions answered   questions encouraged   reassurance provided  Taken 4/26/2025 0800 by Danie Olivas RN  Trust Relationship/Rapport:   care explained   choices provided   questions answered   questions encouraged   reassurance provided  Goal: Readiness for Transition of Care  Outcome: Not Progressing     Problem: Restraint, Nonviolent  Goal: Absence of Harm or Injury  Outcome: Not Progressing  Intervention: Implement Least Restrictive Safety Strategies  Recent Flowsheet Documentation  Taken 4/26/2025 1600 by Danie Olivas RN  Medical Device Protection: tubing secured  Diversional Activities: television  Taken 4/26/2025 1400 by Danie Olivas, RN  Diversional Activities: television  Taken 4/26/2025 1200 by Danie Olivas RN  Medical Device Protection: tubing secured  Diversional Activities: television  Taken 4/26/2025 1000 by Danie Olivas RN  Diversional  Activities: television  Taken 4/26/2025 0800 by Danie Olivas RN  Medical Device Protection: tubing secured  Diversional Activities: television  Intervention: Protect Dignity, Rights and Personal Wellbeing  Recent Flowsheet Documentation  Taken 4/26/2025 1600 by Danie Olivas RN  Trust Relationship/Rapport:   care explained   choices provided   questions answered   questions encouraged   reassurance provided  Taken 4/26/2025 1400 by Danie Olivas RN  Trust Relationship/Rapport:   care explained   choices provided   questions answered   questions encouraged   reassurance provided  Taken 4/26/2025 1200 by Danie Olivas RN  Trust Relationship/Rapport:   care explained   choices provided   questions answered   questions encouraged   reassurance provided  Taken 4/26/2025 1000 by Danie Olivas RN  Trust Relationship/Rapport:   care explained   choices provided   questions answered   questions encouraged   reassurance provided  Taken 4/26/2025 0800 by Danie Olivas RN  Trust Relationship/Rapport:   care explained   choices provided   questions answered   questions encouraged   reassurance provided  Intervention: Protect Skin and Joint Integrity  Recent Flowsheet Documentation  Taken 4/26/2025 1600 by Danie Olivas RN  Body Position: weight shifting  Taken 4/26/2025 0800 by Danie Olivas RN  Body Position: weight shifting     Problem: Seizure, Active Management  Goal: Absence of Seizure/Seizure-Related Injury  Outcome: Not Progressing  Intervention: Prevent Seizure-Related Injury  Recent Flowsheet Documentation  Taken 4/26/2025 1600 by Danie Olivas RN  Sensory Stimulation Regulation: auditory stimulation provided  Seizure Precautions:   activity supervised   clutter-free environment maintained   side rails padded   soft boundaries provided   emergency equipment at bedside  Taken 4/26/2025 1400 by Danie Olivas RN  Sensory Stimulation Regulation: auditory stimulation  provided  Taken 4/26/2025 1200 by Danie Olivas, RN  Sensory Stimulation Regulation: auditory stimulation provided  Seizure Precautions:   activity supervised   clutter-free environment maintained   side rails padded   soft boundaries provided   emergency equipment at bedside  Taken 4/26/2025 1000 by Danie Olivas, RN  Sensory Stimulation Regulation: auditory stimulation provided  Taken 4/26/2025 0800 by Danie Olivas, RN  Sensory Stimulation Regulation: auditory stimulation provided  Seizure Precautions:   activity supervised   clutter-free environment maintained   side rails padded   soft boundaries provided   emergency equipment at bedside   Goal Outcome Evaluation:      Plan of Care Reviewed With: patient, family    Overall Patient Progress: no changeOverall Patient Progress: no change    Outcome Evaluation: Patient has been kept from pulling at lines today. sitter has been in the room. grider was pulled today, and still waiting for him to urinate. male primofit has been placed and is patent. oxy given once today for incisional pain. Patient is still in algorithm 4+ on the insulin drip. this AM his BG was 56 and D50 was given. patient did get up and pivot to the commode today for a BM. still fairly loose. tube feed is running at goal rate. the replacement NG is in the stomach, and it is flushing well.

## 2025-04-26 NOTE — PLAN OF CARE
Goal Outcome Evaluation:  ICU End of Shift Summary. See flowsheets for vital signs and detailed assessment.    Changes this shift: Patient with varying neuros, overall improving some.  Following some more complex commands than previous shift.  Able to run knee along shin, which was new.      Bath completed.  Given 1 dose of hydralazine for SBP in 160s. Improved drastically, with subsequent pressures 125-140.      Garbled speech makes communication difficulty, although he is able to understand and follow commands easily.  He seems to make the tones with his voice, but he's extremely garbled.      Plan: Continue to monitor neuros Q2H per orders.  Sitter still needed for impulsiveness.  Patient will attempt to get out of bed. Pulled NG at 20 minutes to shift change, x-ray ordered to reverify placement, hold TF until replacement verified.            Problem: Delirium  Goal: Optimal Coping  Outcome: Progressing  Intervention: Optimize Psychosocial Adjustment to Delirium  Recent Flowsheet Documentation  Taken 4/25/2025 2000 by Sidney Anthony, RN  Supportive Measures: positive reinforcement provided  Family/Support System Care: support provided  Goal: Improved Behavioral Control  Outcome: Progressing  Intervention: Prevent and Manage Agitation  Recent Flowsheet Documentation  Taken 4/25/2025 2000 by Sidney Anthony, RN  Environment Familiarity/Consistency: daily routine followed  Intervention: Minimize Safety Risk  Recent Flowsheet Documentation  Taken 4/25/2025 2000 by Sidney Anthony, RN  Enhanced Safety Measures: pain management  Trust Relationship/Rapport:   care explained   choices provided   questions answered   questions encouraged   reassurance provided  Goal: Improved Attention and Thought Clarity  Outcome: Progressing  Goal: Improved Sleep  Outcome: Progressing  Intervention: Promote Sleep  Recent Flowsheet Documentation  Taken 4/25/2025 2000 by Sidney Anthony, RN  Sleep/Rest Enhancement:    noise level reduced   regular sleep/rest pattern promoted   room darkened

## 2025-04-26 NOTE — PROGRESS NOTES
[facility]    ICU Progress Note       Date of Admission:  4/22/2025    Assessment: Critical Care   Pascual Perea is a 84 year old male admitted on 4/22/2025. 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  Overall the patient is progressing as expected and has not had any seizures for the last 48 hours.  His postop respiratory failure has also resolved and he was extubated yesterday without issues.       Plan: Critical Care   Neuro/ pain/ sedation:  - Monitor neurological status. Notify provider for any acute changes in exam  - Status post right craniectomy for right temporal brain tumor  History of cerebral vasogenic edema with midline shift present on admission  History of neuroendocrine tumor  Postoperative seizure on this admission  Appreciate neurosurgery and neurology input, continue with multimodal pain control, continue with Keppra for seizures neurosurgery managing steroid taper  Would consider removing EEG since no further episodes of seizures    Pulmonary:  - Ventilator settings: Extubated from CPAP yesterday without complications  - supplemental oxygen to keep saturation about 92%  - Overall the patient appears to be progressing as expected, he has been extubated, he is still continues to require aggressive bronchial hygiene and CoughAssist but he is protecting his airway     Cardiovascular:  - Monitor hemodynamic status.  - Status post cardiac arrest most likely secondary to hypoxemia after seizure no subsequent issues of cardiac arrest  - Patient does have bradycardia however is maintaining his blood pressure  - The patient also has a history of  atrial fibrillation holding anticoagulation  - We are holding famotidine given rare reports of bradycardia     GI/Nutrition:  - Diet: NPO for Medical/Clinical Reasons Except for: Meds  Adult Formula Drip Feeding: Continuous Novasource Renal; Orogastric tube; Goal Rate: 50 ml/hr x 22 hours (hold x2 hours for levothyroxine dose); mL/hr; Start TF @ 10 mL/hr. Advance by 10 ml q 12 hours until goal rate reached. Hold x2 hours daily f...    - Nutrition consulted. Appreciate recommendations.   - would recommend bedside swallow, if patient does not pass bedside swallow would recommend formal speech-language pathology consult and advance diet as tolerated.    Renal/ Fluid Balance:   - Will monitor intake and output  - Chronic kidney disease  - ICU electrolyte replacement protocol   - Avoid nephrotoxic agents    Endocrine:  - Type 2 diabetes present on admission  - Hypothyroidism  Restart levothyroxine which patient was on prior to admission  - Concern for stress-induced hyperglycemia insulin drip as neededn    ID:  - No active ID issues    Hematology:  - Acute blood loss anemia, no indication for transfusion at this time  - Thrombocytopenia however given no active bleeding no indication for transfer fusion of platelets at this time    MSK:   - PT and OT consulted. Appreciate recommendations.       Lines/ tubes/ drains:  Duran Catheter: PRESENT, indication: Surgical procedure, ICU only: hourly urine output needed for patient care  Lines: None       Prophylaxis:  - DVT Prophylaxis: Defer to primary service  - PUD Prophylaxis: Not indicated at this time    Code Status: Full Code      Disposition:  - ICU,     The patient's care was discussed with the Bedside Nurse.  Critical care time exclusive of procedures: 32    Oscar Anne MD  [facility]  Securely message with [employer] (more info)  Text page via [employer] Paging/Directory     Clinically Significant Risk Factors          # Hyperchloremia: Highest Cl =  "108 mmol/L in last 2 days, will monitor as appropriate       # Hypercalcemia: Highest iCa = 5.3 mg/dL in last 2 days, will monitor as appropriate    # Hypoalbuminemia: Lowest albumin = 3.3 g/dL at 4/23/2025  5:31 PM, will monitor as appropriate   # Thrombocytopenia: Lowest platelets = 77 in last 2 days, will monitor for bleeding   # Hypertension: Noted on problem list           # DMII: A1C = 7.9 % (Ref range: <5.7 %) within past 6 months   # Overweight: Estimated body mass index is 26.35 kg/m  as calculated from the following:    Height as of this encounter: 1.803 m (5' 11\").    Weight as of this encounter: 85.7 kg (188 lb 15 oz).      # Financial/Environmental Concerns:                ______________________________________________________________________    Interval History   Status post extubation doing well    Physical Exam   Vital Signs: Temp: 99  F (37.2  C) Temp src: Axillary BP: 118/84 Pulse: 61   Resp: 18 SpO2: 98 % O2 Device: None (Room air) Oxygen Delivery: 2 LPM  Weight: 188 lbs 14.95 oz  General Appearance: Elderly male sitting in chair in no acute distress  Respiratory: Coarse upper airway breath sounds otherwise clear to auscultation  Cardiovascular: Bradycardia and irregular  GI: Abdomen soft nontender nondistended  Skin: Slightly pale otherwise cool to touch neuro follows commands  Other: No obvious rash    Data   I reviewed all medications, new labs and imaging results over the last 24 hours.  Arterial Blood Gases   Recent Labs   Lab 04/22/25  1449 04/22/25  1332   PH 7.38 7.03*   PCO2 35 46*   PO2 154* 486*   HCO3 21 12*       Complete Blood Count   Recent Labs   Lab 04/26/25  0510 04/25/25  1432 04/25/25  0329 04/24/25  0517   WBC 7.3 8.2 6.9 8.2   HGB 9.2* 9.6* 9.6* 9.4*   PLT 86* 80* 77* 91*       Basic Metabolic Panel  Recent Labs   Lab 04/26/25  1306 04/26/25  1155 04/26/25  1006 04/26/25  0926 04/26/25  0655 04/26/25  0510 04/25/25  0433 04/25/25  0329 04/24/25  0810 04/24/25  0517 " 04/23/25  1829 04/23/25  1731   NA  --   --   --   --   --  139  --  138  --  139  --  139   POTASSIUM  --   --   --   --   --  4.1  --  4.1  --  3.8  --  4.0   CHLORIDE  --   --   --   --   --  104  --  108*  --  108*  --  108*   CO2  --   --   --   --   --  23  --  21*  --  20*  --  20*   BUN  --   --   --   --   --  26.8*  --  30.7*  --  28.2*  --  27.9*   CR  --   --   --   --   --  0.83  --  1.01  --  1.04  --  1.10   * 263* 138* 125*   < > 151*   < > 172*   < > 168*   < > 203*    < > = values in this interval not displayed.       Liver Function Tests  Recent Labs   Lab 04/23/25  1731 04/22/25  1447 04/22/25  1349   AST 17  --  30   ALT 17  --  30   ALKPHOS 51  --  61   BILITOTAL 0.7  --  0.6   ALBUMIN 3.3*  --  3.8   INR  --  1.21* 1.30*       Pancreatic Enzymes  No lab results found in last 7 days.    Coagulation Profile  Recent Labs   Lab 04/22/25  1447 04/22/25  1349   INR 1.21* 1.30*   PTT 21* 23       IMAGING:  Recent Results (from the past 24 hours)   XR Abdomen Port 1 View    Narrative    EXAM: XR ABDOMEN PORT 1 VIEW  LOCATION: [facility]  DATE: 4/26/2025    INDICATION: Verification of NG tube.  COMPARISON: None.      Impression    IMPRESSION: Enteric tube in the body of the stomach.        [facility] ICU Rounding checklist    Assessment of central venous catheter access No central access present or removing today   Assessment of urinary catheter use No grider present or removing today   DVT prophylaxis Defer to primary service

## 2025-04-26 NOTE — PROGRESS NOTES
[facility]    Neurosurgery  Daily Note    Assessment & Plan   Procedure(s):  RIGHT FRONTO-TEMPORAL CRANIOTOMY, USING OPTICAL TRACKING SYSTEM, WITH NEOPLASM EXCISION   4 Days Post-Op  MD Dre De Leon metastatic small cell carcinoma      AM ROUNDS: extubated. Denies pain.  EXAM: awake, alert, oriented to person and year. Incision c/d/I. MUJICA symmetrically. Subtle facial droop and left tongue deviation.  LABS: Na 139, Hgb 9.2, platelet 86k  IMAGING: head CT 4/23 stable     Plan:  If drop <75k, should transfuse for goal platelets >75k     -OK for 7th floor q2H neuro checks 7a-7p, q4H 7p-7a. NCC prefers one more day in ICU.   -Pain control  -Decadron 2 weeks to off orders in   -Seizure meds per neurology  -Wound cares. May leave open to air  -Rad Onc following     Dr. Erickson in agreement    Chrissy Jackson PA-C  [facility] Neurosurgery  [address]  [address] [place] [id number]  Tel [phone]  Fax [phone]  Text page via [facility] Paging/Directory    Active Problems:    S/P craniotomy     Chrissy Modi PA-C    Interval History   Stable     Physical Exam   Temp: 99  F (37.2  C) Temp src: Axillary BP: 116/72 Pulse: 63   Resp: 29 SpO2: 92 % O2 Device: None (Room air) Oxygen Delivery: 2 LPM  Vitals:    04/22/25 1430 04/23/25 0342 04/25/25 0600   Weight: 82.2 kg (181 lb 3.5 oz) 81.3 kg (179 lb 3.7 oz) 85.7 kg (188 lb 15 oz)     Vital Signs with Ranges  Temp:  [97.6  F (36.4  C)-99  F (37.2  C)] 99  F (37.2  C)  Pulse:  [55-85] 63  Resp:  [17-45] 29  BP: ()/(38-78) 116/72  SpO2:  [90 %-97 %] 92 %  I/O last 3 completed shifts:  In: 1972.93 [I.V.:522.93; NG/GT:370]  Out: 1955 [Urine:1955]    awake, alert, oriented to person and year. Incision c/d/I. MUJICA symmetrically. Subtle facial droop and left tongue deviation.      Medications   Current Facility-Administered Medications   Medication Dose Route Frequency Provider Last Rate Last Admin    dextrose 10% infusion   Intravenous  Continuous PRN Leydi Meyers MD        dextrose 10% infusion   Intravenous Continuous PRN Leydi Meyers MD        insulin regular (MYXREDLIN) 1 unit/mL infusion  0-24 Units/hr Intravenous Continuous Leydi Meyers MD   Stopped at 04/26/25 0856    sodium chloride 0.9 % infusion   Intravenous Continuous Leydi Meyers MD   Stopped at 04/26/25 0900      Current Facility-Administered Medications   Medication Dose Route Frequency Provider Last Rate Last Admin    atorvastatin (LIPITOR) tablet 40 mg  40 mg Oral or Feeding Tube Daily Leydi Meyers MD   40 mg at 04/26/25 0910    dexAMETHasone (DECADRON) injection 4 mg  4 mg Intravenous Q12H Nelly Coon NP        Followed by    [START ON 4/28/2025] dexAMETHasone (DECADRON) injection 4 mg  4 mg Intravenous Daily Nelly Coon NP        Followed by    [START ON 5/1/2025] dexAMETHasone (DECADRON) injection 2 mg  2 mg Intravenous Daily Nelly Coon NP        Followed by    [START ON 5/4/2025] dexAMETHasone (DECADRON) injection 1 mg  1 mg Intravenous Daily Nelly Coon NP        [Held by provider] isosorbide mononitrate (IMDUR) 24 hr tablet 30 mg  30 mg Oral Daily Leydi Meyers MD        levETIRAcetam (KEPPRA) oral solution 1,500 mg  1,500 mg Oral or Feeding Tube Q12H Estefany Lopez MD   1,500 mg at 04/26/25 0311    levothyroxine (SYNTHROID/LEVOTHROID) tablet 125 mcg  125 mcg Oral or Feeding Tube Daily Leydi Meyers MD   125 mcg at 04/26/25 0910    polyethylene glycol (MIRALAX) Packet 17 g  17 g Oral or Feeding Tube Daily Nelly Coon NP   17 g at 04/25/25 0802    senna-docusate (SENOKOT-S/PERICOLACE) 8.6-50 MG per tablet 1 tablet  1 tablet Oral or Feeding Tube BID Nelly Coon NP   1 tablet at 04/26/25 0910    sodium chloride (PF) 0.9% PF flush 3 mL  3 mL Intracatheter Q8H BOYD Nelly Coon NP   3 mL at 04/25/25 1338    sodium chloride 0.9% BOLUS 250 mL  250 mL Intravenous Once Leydi Meyers MD   Rate Verify at 04/23/25  [id number]

## 2025-04-26 NOTE — PROGRESS NOTES
Patient pulled NG tube around 06:30. Tube feeds were stopped and NG was replaced. X-ray was not completed until 09:00. Patient BG dropped to 59  @ 08:55. 25 ml of D50 were given via IV. Insulin drip was stopped. Tube feeds were resumed @ 09:15. BG recheck @ 09:25 was 125. Going to keep insulin drip off until next BG check around 10:30.

## 2025-04-26 NOTE — PROGRESS NOTES
[facility]    Vascular Neurology Progress Note    Interval History     Temp: 98.3-99F  SBP range: 127-139  Heart rate: 62  Wbc: 7.3  Sodium: 139  Blood glucose: 151, insulin gtt     No acute events overnight, continues to be impulsive overnight with trying to get out bed, requiring sitter. More tired today during neuro exam, nursing reports neuro unchanged, pt was involved in activities all morning.     Hospital Course     Chief complaint: No chief complaint on file.    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 MRI showed large right temporal lobe mass and associated hemorrhage. He was discharged on 4/18 and returned on 4/22 for right temporal craniotomy. Postop he had witnessed seizures and cardiopulmonary arrest. Initial EEG shows right focal slowing and right epileptogenic discharges. Dayday was continued on current Keppra regimen. No seizures on EEG, so continuous EEG discontinued yesterday 4/25.    Assessment and Plan     Large right anterior temporal lobe metastatic mass excision with associated hemorrhage and cerebral edema s/p right frontotemporal craniotomy. Repeat CT on 4/23 did not show significant intracranial changes.   Postoperative cardiopulmonary arrest s/p CPR (epi x 2) with ROSC  Seizures 2/2 above  Atrial fibrillation (not currently anticoagulated/ stopped due to scheduled craniotomy)     DVT Prophylaxis:  SCDs, hold pharmacologic VTE prophylaxis until cleared by neurosurgery      Recommendations   - Stay in ICU at least 1 more day and can transfer out ICU tomorrow if neuro remains stable  - Neuro checks and vital signs every 2 hour during the day, can do every 4 hours at night to  "allow rest/sleep  - Appreciate neurosurgery recommendations  - Continue SBP < 140   - Elevate head of bed 30 degrees  - Seizure precautions  - Continue maintenance Keppra 1.5 g every 12 hours following  - PRN IV Ativan 2 mg q 5 minutes if clinical seizure activity noted and lasts longer than 2 minutes, STAT page to NCC team  - Hold any anticoagulation/antiplatelets/NSAIDs until cleared by neurosurgery  - Recommend euthermia, euglycemia (recommend BG <180), eunatremia     Patient Follow-up    - final recommendation pending work-up    We will continue to follow.     JAKI Arndt CNP  Vascular Neurology    To page me or covering stroke neurology team member, click here: AMCOM  Choose \"On Call\" tab at top, then select \"NEUROLOGY/ALL SITES\" from middle drop-down box, press Enter, then look for \"stroke\" or \"telestroke\" for your site.    Physical Examination     Temp: 99  F (37.2  C) Temp src: Axillary BP: 139/63 Pulse: 62   Resp: 26 SpO2: 96 % O2 Device: None (Room air) Oxygen Delivery: 2 LPM    General Exam  General:  patient lying in bed without any acute distress      Neuro Exam  Mental Status: More tired today, able to states his age, follows some commands with encouragement  Cranial Nerves: Possible left field cut vs neglect? Left facial droop. Eyes midline, EOMI with normal smooth pursuit, facial sensation intact and symmetric, hearing not formally tested but intact to conversation, palate elevation symmetric and uvula midline  Motor: Drift in bilateral upper extremities (left weaker than right), bilateral legs equal in strength and has antigravity  Reflexes: Deferred  Sensory:  deferred today  Coordination:  Deferred  Station/Gait:  deferred    Imaging/Labs   (Bolded imaging and labs new and/or personally reviewed or re-reviewed by me today)    MRI/Head CT CT head 4/16: Large right anterior temporal lobe mass 5.3 x 4 x 3.8 cm and associated hemorrhage posterior to mass. Significant vasogenic edema with right to " "left midline shift 6.6 mm.      CT head 4/22: Interval right frontopariteal and temporal cranioomy. Small hemorrhage and gas cavity. Stable vasogenic edema. Persistent mild effacement of right lateral ventricle.      CT head 4/23: Stable postop right craniotomy with small blood and bluid along margin. Unchanged mass in right temporal lob and vasogenic edema, and unchanged mass effect including trace leftward midline shift.      MRI brain 4/16: Large mass right temporal lobe with vasogenic edema measures 5.3x3.8x4.3. Right to left midline shift 4 to 5 mm.      MRI brain 4/23: Postop changes related to right pterional craniotomy in anterior right temporal lobe. 4.6 x 2.8 x 3 cm area may represent residual tumor. Slightly diminished edema in right temporal/frontal area. 4 mm right to left midline shift.       Intracranial Vasculature CTA head 4/17: NO LVO. Plaque in bilateral carotid siphons. Unchanged 6 mm leftward midline shift. Anterior displacement of right MCA due to right mass.    Cervical Vasculature CTA neck 4/17: Plaque in bilateral carotid bifurcation       Echocardiogram N/A   EKG/Telemetry Atrial fibrillation with LBBB    Additional Testing EEG Day 1: Showed electroencephalogram is abnormal due to the presences of background slow activity suggesting moderate encephalopathy and focal slowing in the right temporal region. No electrographic seizures or epileptiform discharges were recorded. There were two events of stimulus induced rhythmic periodic discharges that lacked the criteria to be diagnosed as seizure\"      EEG Day 2: Video electroencephalogram is abnormal due to the presences of background slow activity suggesting moderate encephalopathy and focal slowing in the right temporal region. No electrographic seizures or epileptiform discharges were recorded.       LDL  No lab value available in past 30 days   A1C  2/10/2025: 6.9 %  4/23/2025: 7.9 %   Troponin 4/22/2025: 53 ng/L       Other labs reviewed " by me today:  CBC with platelets, BMP    Time Spent on this Encounter   Billing: I have personally spent a total of 45 minutes providing care today, time spent in reviewing medical records and devising the plan as recorded above.

## 2025-04-27 ENCOUNTER — APPOINTMENT (OUTPATIENT)
Dept: PHYSICAL THERAPY | Facility: CLINIC | Age: 85
DRG: 025 | End: 2025-04-27
Attending: NEUROLOGICAL SURGERY
Payer: COMMERCIAL

## 2025-04-27 LAB
ANION GAP SERPL CALCULATED.3IONS-SCNC: 8 MMOL/L (ref 7–15)
BUN SERPL-MCNC: 29.5 MG/DL (ref 8–23)
CA-I BLD-MCNC: 5.1 MG/DL (ref 4.4–5.2)
CALCIUM SERPL-MCNC: 9.4 MG/DL (ref 8.8–10.4)
CHLORIDE SERPL-SCNC: 106 MMOL/L (ref 98–107)
CREAT SERPL-MCNC: 0.89 MG/DL (ref 0.67–1.17)
EGFRCR SERPLBLD CKD-EPI 2021: 85 ML/MIN/1.73M2
ERYTHROCYTE [DISTWIDTH] IN BLOOD BY AUTOMATED COUNT: 14.8 % (ref 10–15)
GLUCOSE BLDC GLUCOMTR-MCNC: 110 MG/DL (ref 70–99)
GLUCOSE BLDC GLUCOMTR-MCNC: 111 MG/DL (ref 70–99)
GLUCOSE BLDC GLUCOMTR-MCNC: 122 MG/DL (ref 70–99)
GLUCOSE BLDC GLUCOMTR-MCNC: 123 MG/DL (ref 70–99)
GLUCOSE BLDC GLUCOMTR-MCNC: 130 MG/DL (ref 70–99)
GLUCOSE BLDC GLUCOMTR-MCNC: 138 MG/DL (ref 70–99)
GLUCOSE BLDC GLUCOMTR-MCNC: 143 MG/DL (ref 70–99)
GLUCOSE BLDC GLUCOMTR-MCNC: 145 MG/DL (ref 70–99)
GLUCOSE BLDC GLUCOMTR-MCNC: 145 MG/DL (ref 70–99)
GLUCOSE BLDC GLUCOMTR-MCNC: 151 MG/DL (ref 70–99)
GLUCOSE BLDC GLUCOMTR-MCNC: 153 MG/DL (ref 70–99)
GLUCOSE BLDC GLUCOMTR-MCNC: 216 MG/DL (ref 70–99)
GLUCOSE BLDC GLUCOMTR-MCNC: 252 MG/DL (ref 70–99)
GLUCOSE BLDC GLUCOMTR-MCNC: 268 MG/DL (ref 70–99)
GLUCOSE BLDC GLUCOMTR-MCNC: 95 MG/DL (ref 70–99)
GLUCOSE SERPL-MCNC: 150 MG/DL (ref 70–99)
HCO3 SERPL-SCNC: 26 MMOL/L (ref 22–29)
HCT VFR BLD AUTO: 26.5 % (ref 40–53)
HGB BLD-MCNC: 8.9 G/DL (ref 13.3–17.7)
MAGNESIUM SERPL-MCNC: 1.7 MG/DL (ref 1.7–2.3)
MCH RBC QN AUTO: 30.8 PG (ref 26.5–33)
MCHC RBC AUTO-ENTMCNC: 33.6 G/DL (ref 31.5–36.5)
MCV RBC AUTO: 92 FL (ref 78–100)
PHOSPHATE SERPL-MCNC: 3.3 MG/DL (ref 2.5–4.5)
PLATELET # BLD AUTO: 77 10E3/UL (ref 150–450)
PLATELET # BLD AUTO: 83 10E3/UL (ref 150–450)
POTASSIUM SERPL-SCNC: 4 MMOL/L (ref 3.4–5.3)
RBC # BLD AUTO: 2.89 10E6/UL (ref 4.4–5.9)
SODIUM SERPL-SCNC: 140 MMOL/L (ref 135–145)
WBC # BLD AUTO: 5.7 10E3/UL (ref 4–11)

## 2025-04-27 PROCEDURE — 250N000013 HC RX MED GY IP 250 OP 250 PS 637: Performed by: STUDENT IN AN ORGANIZED HEALTH CARE EDUCATION/TRAINING PROGRAM

## 2025-04-27 PROCEDURE — 82330 ASSAY OF CALCIUM: CPT | Performed by: STUDENT IN AN ORGANIZED HEALTH CARE EDUCATION/TRAINING PROGRAM

## 2025-04-27 PROCEDURE — 84100 ASSAY OF PHOSPHORUS: CPT | Performed by: STUDENT IN AN ORGANIZED HEALTH CARE EDUCATION/TRAINING PROGRAM

## 2025-04-27 PROCEDURE — 85027 COMPLETE CBC AUTOMATED: CPT | Performed by: STUDENT IN AN ORGANIZED HEALTH CARE EDUCATION/TRAINING PROGRAM

## 2025-04-27 PROCEDURE — 99231 SBSQ HOSP IP/OBS SF/LOW 25: CPT | Mod: 24 | Performed by: ANESTHESIOLOGY

## 2025-04-27 PROCEDURE — 97530 THERAPEUTIC ACTIVITIES: CPT | Mod: GP | Performed by: PHYSICAL THERAPIST

## 2025-04-27 PROCEDURE — 85049 AUTOMATED PLATELET COUNT: CPT | Performed by: PHYSICIAN ASSISTANT

## 2025-04-27 PROCEDURE — 36415 COLL VENOUS BLD VENIPUNCTURE: CPT | Performed by: PHYSICIAN ASSISTANT

## 2025-04-27 PROCEDURE — 250N000013 HC RX MED GY IP 250 OP 250 PS 637: Performed by: NURSE PRACTITIONER

## 2025-04-27 PROCEDURE — 80048 BASIC METABOLIC PNL TOTAL CA: CPT | Performed by: STUDENT IN AN ORGANIZED HEALTH CARE EDUCATION/TRAINING PROGRAM

## 2025-04-27 PROCEDURE — 250N000011 HC RX IP 250 OP 636: Mod: JZ | Performed by: STUDENT IN AN ORGANIZED HEALTH CARE EDUCATION/TRAINING PROGRAM

## 2025-04-27 PROCEDURE — 83735 ASSAY OF MAGNESIUM: CPT | Performed by: STUDENT IN AN ORGANIZED HEALTH CARE EDUCATION/TRAINING PROGRAM

## 2025-04-27 PROCEDURE — 97116 GAIT TRAINING THERAPY: CPT | Mod: GP | Performed by: PHYSICAL THERAPIST

## 2025-04-27 PROCEDURE — 120N000013 HC R&B IMCU

## 2025-04-27 PROCEDURE — 250N000009 HC RX 250: Performed by: STUDENT IN AN ORGANIZED HEALTH CARE EDUCATION/TRAINING PROGRAM

## 2025-04-27 PROCEDURE — 99291 CRITICAL CARE FIRST HOUR: CPT | Mod: 24 | Performed by: NURSE PRACTITIONER

## 2025-04-27 PROCEDURE — 36415 COLL VENOUS BLD VENIPUNCTURE: CPT | Performed by: STUDENT IN AN ORGANIZED HEALTH CARE EDUCATION/TRAINING PROGRAM

## 2025-04-27 PROCEDURE — 250N000011 HC RX IP 250 OP 636: Performed by: NURSE PRACTITIONER

## 2025-04-27 RX ADMIN — SENNOSIDES AND DOCUSATE SODIUM 1 TABLET: 50; 8.6 TABLET ORAL at 08:59

## 2025-04-27 RX ADMIN — DEXAMETHASONE SODIUM PHOSPHATE 4 MG: 4 INJECTION, SOLUTION INTRAMUSCULAR; INTRAVENOUS at 08:58

## 2025-04-27 RX ADMIN — LEVETIRACETAM 1500 MG: 100 SOLUTION ORAL at 04:00

## 2025-04-27 RX ADMIN — ATORVASTATIN CALCIUM 40 MG: 40 TABLET, FILM COATED ORAL at 08:59

## 2025-04-27 RX ADMIN — LEVOTHYROXINE SODIUM 125 MCG: 125 TABLET ORAL at 08:59

## 2025-04-27 RX ADMIN — HYDROMORPHONE HYDROCHLORIDE 0.5 MG: 1 INJECTION, SOLUTION INTRAMUSCULAR; INTRAVENOUS; SUBCUTANEOUS at 01:35

## 2025-04-27 RX ADMIN — INSULIN HUMAN 14 UNITS/HR: 1 INJECTION, SOLUTION INTRAVENOUS at 17:00

## 2025-04-27 RX ADMIN — DEXAMETHASONE SODIUM PHOSPHATE 4 MG: 4 INJECTION, SOLUTION INTRAMUSCULAR; INTRAVENOUS at 21:07

## 2025-04-27 RX ADMIN — LEVETIRACETAM 1500 MG: 100 SOLUTION ORAL at 14:54

## 2025-04-27 ASSESSMENT — ACTIVITIES OF DAILY LIVING (ADL)
ADLS_ACUITY_SCORE: 56
ADLS_ACUITY_SCORE: 55
ADLS_ACUITY_SCORE: 55
ADLS_ACUITY_SCORE: 62
ADLS_ACUITY_SCORE: 61
ADLS_ACUITY_SCORE: 61
ADLS_ACUITY_SCORE: 54
ADLS_ACUITY_SCORE: 62
ADLS_ACUITY_SCORE: 65
ADLS_ACUITY_SCORE: 54
ADLS_ACUITY_SCORE: 61
ADLS_ACUITY_SCORE: 55
ADLS_ACUITY_SCORE: 61
ADLS_ACUITY_SCORE: 62
ADLS_ACUITY_SCORE: 56
ADLS_ACUITY_SCORE: 61
ADLS_ACUITY_SCORE: 55
ADLS_ACUITY_SCORE: 56
ADLS_ACUITY_SCORE: 55
ADLS_ACUITY_SCORE: 61

## 2025-04-27 NOTE — PLAN OF CARE
Problem: Delirium  Goal: Optimal Coping  Outcome: Progressing  Intervention: Optimize Psychosocial Adjustment to Delirium  Recent Flowsheet Documentation  Taken 4/27/2025 1400 by Danie Olivas RN  Supportive Measures: positive reinforcement provided  Family/Support System Care: support provided  Taken 4/27/2025 1200 by Danie Olivas RN  Supportive Measures: positive reinforcement provided  Family/Support System Care: support provided  Taken 4/27/2025 1000 by Danie Olivas RN  Supportive Measures: positive reinforcement provided  Family/Support System Care: support provided  Taken 4/27/2025 0800 by Danie Olivas RN  Supportive Measures: positive reinforcement provided  Family/Support System Care: support provided  Goal: Improved Behavioral Control  Outcome: Progressing  Intervention: Prevent and Manage Agitation  Recent Flowsheet Documentation  Taken 4/27/2025 1400 by Danie Olivas RN  Environment Familiarity/Consistency: daily routine followed  Taken 4/27/2025 1200 by Danie Olivas RN  Environment Familiarity/Consistency: daily routine followed  Taken 4/27/2025 1000 by Danie Olivas RN  Environment Familiarity/Consistency: daily routine followed  Taken 4/27/2025 0800 by Danie Olivas RN  Environment Familiarity/Consistency: daily routine followed  Intervention: Minimize Safety Risk  Recent Flowsheet Documentation  Taken 4/27/2025 1400 by Danie Olivas, RN  Trust Relationship/Rapport:   care explained   choices provided   questions answered   questions encouraged   reassurance provided  Taken 4/27/2025 1200 by Danie Olivas, RN  Enhanced Safety Measures:   assistive devices when indicated   pain management   review medications for side effects with activity   room near unit station    at bedside  Trust Relationship/Rapport:   care explained   choices provided   questions answered   questions encouraged   reassurance provided  Taken 4/27/2025 1000 by  Danie Olivas, RN  Trust Relationship/Rapport:   care explained   choices provided   questions answered   questions encouraged   reassurance provided  Taken 4/27/2025 0800 by Danie Olivas, RN  Enhanced Safety Measures:   assistive devices when indicated   pain management   review medications for side effects with activity   room near unit station    at bedside  Trust Relationship/Rapport:   care explained   choices provided   questions answered   questions encouraged   reassurance provided  Goal: Improved Attention and Thought Clarity  Outcome: Progressing  Intervention: Maximize Cognitive Function  Recent Flowsheet Documentation  Taken 4/27/2025 1400 by Danie Olivas, RN  Sensory Stimulation Regulation: auditory stimulation provided  Reorientation Measures:   calendar in view   clock in view  Taken 4/27/2025 1200 by Danie Olivas RN  Sensory Stimulation Regulation: auditory stimulation provided  Reorientation Measures:   calendar in view   clock in view  Taken 4/27/2025 1000 by Danie Olivas RN  Sensory Stimulation Regulation: auditory stimulation provided  Reorientation Measures:   calendar in view   clock in view  Taken 4/27/2025 0800 by Danie Olivas RN  Sensory Stimulation Regulation: auditory stimulation provided  Reorientation Measures:   calendar in view   clock in view  Goal: Improved Sleep  Outcome: Progressing     Problem: Adult Inpatient Plan of Care  Goal: Plan of Care Review  Description: The Plan of Care Review/Shift note should be completed every shift.  The Outcome Evaluation is a brief statement about your assessment that the patient is improving, declining, or no change.  This information will be displayed automatically on your shiftnote.  Outcome: Progressing  Flowsheets (Taken 4/27/2025 1456)  Outcome Evaluation: Patient has improved significantly today. he is more alert and oriented. he was able to walk around the unit with PT. his speech has cleared  "up, even though there is still some garbling. insulin drip is stil on algorithm 4+1. tube feeds still running. neuros are intact with some slight left facial droop and left tongue deviation. stood assist of 1 with GB and walker. incision is CDI and approximated with sutures. no bowel movement today, yet. going to place a SLP consult.  Overall Patient Progress: improving  Goal: Patient-Specific Goal (Individualized)  Description: You can add care plan individualizations to a care plan. Examples of Individualization might be:  \"Parent requests to be called daily at 9am for status\", \"I have a hard time hearing out of my right ear\", or \"Do not touch me to wake me up as it startlesme\".  Outcome: Progressing  Goal: Absence of Hospital-Acquired Illness or Injury  Outcome: Progressing  Intervention: Identify and Manage Fall Risk  Recent Flowsheet Documentation  Taken 4/27/2025 1200 by Danie Olivas, RN  Safety Promotion/Fall Prevention:   activity supervised   assistive device/personal items within reach   bedside attendant   clutter free environment maintained   increase visualization of patient   nonskid shoes/slippers when out of bed   patient and family education   room near nurse's station   room organization consistent   safety round/check completed   supervised activity  Taken 4/27/2025 0800 by Danie Olivas, RN  Safety Promotion/Fall Prevention:   activity supervised   assistive device/personal items within reach   bedside attendant   clutter free environment maintained   increase visualization of patient   nonskid shoes/slippers when out of bed   patient and family education   room near nurse's station   room organization consistent   safety round/check completed   supervised activity  Intervention: Prevent Skin Injury  Recent Flowsheet Documentation  Taken 4/27/2025 1200 by Danie Olivas, RN  Body Position: weight shifting  Taken 4/27/2025 0800 by Danie Olivas, RN  Body Position: weight " shifting  Intervention: Prevent and Manage VTE (Venous Thromboembolism) Risk  Recent Flowsheet Documentation  Taken 4/27/2025 1200 by Danie Olivas RN  VTE Prevention/Management: SCDs on (sequential compression devices)  Taken 4/27/2025 0800 by Danie Olivas RN  VTE Prevention/Management: SCDs on (sequential compression devices)  Intervention: Prevent Infection  Recent Flowsheet Documentation  Taken 4/27/2025 1200 by Danie Olivas, RN  Infection Prevention:   single patient room provided   hand hygiene promoted  Taken 4/27/2025 0800 by Danie Olivas RN  Infection Prevention:   single patient room provided   hand hygiene promoted  Goal: Optimal Comfort and Wellbeing  Outcome: Progressing  Intervention: Provide Person-Centered Care  Recent Flowsheet Documentation  Taken 4/27/2025 1400 by Danie Olivas RN  Trust Relationship/Rapport:   care explained   choices provided   questions answered   questions encouraged   reassurance provided  Taken 4/27/2025 1200 by Danie Olivas RN  Trust Relationship/Rapport:   care explained   choices provided   questions answered   questions encouraged   reassurance provided  Taken 4/27/2025 1000 by Danie Olivas RN  Trust Relationship/Rapport:   care explained   choices provided   questions answered   questions encouraged   reassurance provided  Taken 4/27/2025 0800 by Danie Olivas RN  Trust Relationship/Rapport:   care explained   choices provided   questions answered   questions encouraged   reassurance provided  Goal: Readiness for Transition of Care  Outcome: Progressing     Problem: Restraint, Nonviolent  Goal: Absence of Harm or Injury  Outcome: Progressing  Intervention: Implement Least Restrictive Safety Strategies  Recent Flowsheet Documentation  Taken 4/27/2025 1400 by Danie Olivas RN  Diversional Activities: television  Taken 4/27/2025 1200 by Danie Olivas RN  Medical Device Protection: tubing secured  Diversional Activities:  television  Taken 4/27/2025 1000 by Danie Olivas RN  Diversional Activities: television  Taken 4/27/2025 0800 by Danie Olivas RN  Medical Device Protection: tubing secured  Diversional Activities: television  Intervention: Protect Dignity, Rights and Personal Wellbeing  Recent Flowsheet Documentation  Taken 4/27/2025 1400 by Danie Olivas RN  Trust Relationship/Rapport:   care explained   choices provided   questions answered   questions encouraged   reassurance provided  Taken 4/27/2025 1200 by Danie Olivas RN  Trust Relationship/Rapport:   care explained   choices provided   questions answered   questions encouraged   reassurance provided  Taken 4/27/2025 1000 by Danie Olivas RN  Trust Relationship/Rapport:   care explained   choices provided   questions answered   questions encouraged   reassurance provided  Taken 4/27/2025 0800 by Danie Olivas RN  Trust Relationship/Rapport:   care explained   choices provided   questions answered   questions encouraged   reassurance provided  Intervention: Protect Skin and Joint Integrity  Recent Flowsheet Documentation  Taken 4/27/2025 1200 by Danie Olivas RN  Body Position: weight shifting  Taken 4/27/2025 0800 by Danie Olivas RN  Body Position: weight shifting     Problem: Seizure, Active Management  Goal: Absence of Seizure/Seizure-Related Injury  Outcome: Progressing  Intervention: Prevent Seizure-Related Injury  Recent Flowsheet Documentation  Taken 4/27/2025 1400 by Danie Olivas RN  Sensory Stimulation Regulation: auditory stimulation provided  Taken 4/27/2025 1200 by Danie Olivas RN  Sensory Stimulation Regulation: auditory stimulation provided  Seizure Precautions:   activity supervised   clutter-free environment maintained   side rails padded   soft boundaries provided   emergency equipment at bedside  Taken 4/27/2025 1000 by Daine Olivas, RN  Sensory Stimulation Regulation: auditory stimulation  provided  Taken 4/27/2025 0800 by Danie Olivas RN  Sensory Stimulation Regulation: auditory stimulation provided  Seizure Precautions:   activity supervised   clutter-free environment maintained   side rails padded   soft boundaries provided   emergency equipment at bedside   Goal Outcome Evaluation:      Plan of Care Reviewed With: patient, family    Overall Patient Progress: improvingOverall Patient Progress: improving    Outcome Evaluation: Patient has improved significantly today. he is more alert and oriented. he was able to walk around the unit with PT. his speech has cleared up, even though there is still some garbling. insulin drip is stil on algorithm 4+1. tube feeds still running. neuros are intact with some slight left facial droop and left tongue deviation. stood assist of 1 with GB and walker. incision is CDI and approximated with sutures. no bowel movement today, yet. going to place a SLP consult.

## 2025-04-27 NOTE — PROGRESS NOTES
"Brief critical care progress note  84-year-old gentleman status postcraniotomy complicated by postop respiratory failure and seizures who has been extubated for greater than 24 hours with no complaints.  Overall he is progressing as expected he is awake alert oriented to person place and time.  He has some subtle left-sided weakness.  He has an adequate cough and is able to protect his airway  Vital signs:  Temp: 99.1  F (37.3  C) Temp src: Axillary BP: 106/51 Pulse: 62   Resp: 21 SpO2: (!) 91 % O2 Device: Nasal cannula Oxygen Delivery: 2 LPM Height: 180.3 cm (5' 11\") Weight: 83.4 kg (183 lb 13.8 oz)  Estimated body mass index is 25.64 kg/m  as calculated from the following:    Height as of this encounter: 1.803 m (5' 11\").    Weight as of this encounter: 83.4 kg (183 lb 13.8 oz).  Awake alert following commands  CV bradycardia but regular  Lab Results   Component Value Date    WBC 5.7 04/27/2025    HGB 8.9 (L) 04/27/2025    HCT 26.5 (L) 04/27/2025    MCV 92 04/27/2025    PLT 77 (L) 04/27/2025      Patient is progressing as expected status post craniotomy complicated by respiratory failure and seizures.  No further episodes of seizures, he has been extubated for greater than 24 hours and shows no signs of airway compromise and is able to cough and protect his airway.  I agree that the patient is okay to transfer to the 7 floor and out of the ICU.  I am available for any critical care consults that may arise in the future.    Oscar Anne MD  Critical Care Medicine  "

## 2025-04-27 NOTE — PROGRESS NOTES
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 Subtle LUE LLE weakness. RUE RLE 5/5. Does not participate in pronator drift or FTN testing. Incision is c/d/I.  LABS: Na 140, Hgb 8.9, platelet 77k     Plan:  If drop <75k, should transfuse for goal platelets >75k. Recheck this afternoon-83k     -OK for 7th floor q2H neuro checks 7a-7p, q4H 7p-7a.   -Pain control  -Decadron 2 weeks to off orders in   -Seizure meds per neurology  -Wound cares. May leave open to air  -Rad Onc following     Dr. Erickson in agreement    Chrissy Jackson PA-C  [facility]  [address]  [address] [place] [id number]  Tel [phone]  Fax [phone]  Text page via [facility] Paging/Directory    Active Problems:    S/P craniotomy     Chrissy Modi PA-C    Interval History   Stable    Physical Exam   Temp: 99.1  F (37.3  C) Temp src: Axillary BP: 105/46 Pulse: 61   Resp: 19 SpO2: (!) 91 % O2 Device: Nasal cannula Oxygen Delivery: 2 LPM  Vitals:    04/23/25 0342 04/25/25 0600 04/26/25 0400   Weight: 81.3 kg (179 lb 3.7 oz) 85.7 kg (188 lb 15 oz) 83.4 kg (183 lb 13.8 oz)     Vital Signs with Ranges  Temp:  [97.9  F (36.6  C)-99.1  F (37.3  C)] 99.1  F (37.3  C)  Pulse:  [52-65] 61  Resp:  [17-40] 19  BP: ()/() 105/46  SpO2:  [89 %-98 %] 91 %  I/O last 3 completed shifts:  In: 1521.09 [I.V.:301.09; NG/GT:260]  Out: 1160 [Urine:1160]  See above.      Medications   Current Facility-Administered Medications   Medication Dose Route Frequency Provider Last Rate Last Admin    dextrose 10% infusion   Intravenous Continuous PRN Leydi Meyers MD        dextrose 10% infusion   Intravenous Continuous PRN Leydi Meyers MD         insulin regular (MYXREDLIN) 1 unit/mL infusion  0-24 Units/hr Intravenous Continuous Leydi Meyers MD 3 mL/hr at 04/27/25 0858 3 Units/hr at 04/27/25 0858    sodium chloride 0.9 % infusion   Intravenous Continuous Leydi Meyers MD 10 mL/hr at 04/26/25 1205 Restarted at 04/26/25 1205      Current Facility-Administered Medications   Medication Dose Route Frequency Provider Last Rate Last Admin    atorvastatin (LIPITOR) tablet 40 mg  40 mg Oral or Feeding Tube Daily Leydi Meyers MD   40 mg at 04/27/25 0859    dexAMETHasone (DECADRON) injection 4 mg  4 mg Intravenous Q12H Nelly Coon NP   4 mg at 04/27/25 0858    Followed by    [START ON 4/28/2025] dexAMETHasone (DECADRON) injection 4 mg  4 mg Intravenous Daily Nelly Coon NP        Followed by    [START ON 5/1/2025] dexAMETHasone (DECADRON) injection 2 mg  2 mg Intravenous Daily Nelly Coon NP        Followed by    [START ON 5/4/2025] dexAMETHasone (DECADRON) injection 1 mg  1 mg Intravenous Daily Nelly Coon NP        [Held by provider] isosorbide mononitrate (IMDUR) 24 hr tablet 30 mg  30 mg Oral Daily Leydi Meyers MD        levETIRAcetam (KEPPRA) oral solution 1,500 mg  1,500 mg Oral or Feeding Tube Q12H Estefany Lopez MD   1,500 mg at 04/27/25 0400    levothyroxine (SYNTHROID/LEVOTHROID) tablet 125 mcg  125 mcg Oral or Feeding Tube Daily Leydi Meyers MD   125 mcg at 04/27/25 0859    polyethylene glycol (MIRALAX) Packet 17 g  17 g Oral or Feeding Tube Daily Nelly Coon NP   17 g at 04/25/25 0802    senna-docusate (SENOKOT-S/PERICOLACE) 8.6-50 MG per tablet 1 tablet  1 tablet Oral or Feeding Tube BID Nelly Coon NP   1 tablet at 04/27/25 0859    sodium chloride (PF) 0.9% PF flush 3 mL  3 mL Intracatheter Q8H [facility] Nelly Coon, NP   3 mL at 04/26/25 7188

## 2025-04-27 NOTE — PROGRESS NOTES
[facility]    Vascular Neurology Progress Note    Interval History     Temp: 99.1  SBP range:   Heart rate: 52-62  Wbc: 5.7  Sodium: 140  Blood glucose: 110, insulin gtt   VTE ppx: SCDs for now    No overnight issues. Neuro improved this morning, more alert/awake, able to follow commands more consistently today.    Hospital Course     Chief complaint: No chief complaint on file.    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 MRI showed large right temporal lobe mass and associated hemorrhage. He was discharged on 4/18 and returned on 4/22 for right temporal craniotomy. Postop he had witnessed seizures and cardiopulmonary arrest. Initial EEG shows right focal slowing and right epileptogenic discharges. Dayday was continued on current Keppra regimen. No seizures on EEG, so continuous EEG discontinued on 4/25.    Assessment and Plan     Large right anterior temporal lobe metastatic mass excision with associated hemorrhage and cerebral edema s/p right frontotemporal craniotomy. Repeat CT on 4/23 did not show significant intracranial changes.   Postoperative cardiopulmonary arrest s/p CPR (epi x 2) with ROSC  Seizures 2/2 above  Atrial fibrillation (not currently anticoagulated/ stopped due to scheduled craniotomy)      DVT Prophylaxis:  SCDs, hold pharmacologic VTE prophylaxis until cleared by neurosurgery      Recommendations   - Ok to transfer out ICU if cleared by all teams  - Neuro checks and vital signs every 2 hour during the day, can do every 4 hours at night to allow rest/sleep  - Appreciate neurosurgery recommendations  - Continue SBP < 140   - Elevate head of bed 30 degrees  - Seizure  "precautions  - Continue maintenance Keppra 1.5 g every 12 hours, once transferred out of ICU, consult Gen Neurology for ongoing seizure management.  - PRN IV Ativan 2 mg q 5 minutes if clinical seizure activity noted and lasts longer than 2 minutes, STAT page to NCC team  - Hold any anticoagulation/antiplatelets/NSAIDs until cleared by neurosurgery  - Recommend euthermia, euglycemia (recommend BG <180), eunatremia     Patient Follow-up    - in 1 week follow with PCP  - in 6-8 weeks with Gen Neurology (ordered)    Stroke Neurology will sign off today.    JAKI Arndt CNP  Vascular Neurology    To page me or covering stroke neurology team member, click here: AMCOM  Choose \"On Call\" tab at top, then select \"NEUROLOGY/ALL SITES\" from middle drop-down box, press Enter, then look for \"stroke\" or \"telestroke\" for your site.    Physical Examination     Temp: 99.1  F (37.3  C) Temp src: Axillary BP: 108/53 Pulse: 54   Resp: 17 SpO2: (!) 89 % O2 Device: Nasal cannula Oxygen Delivery: 2 LPM    General Exam  General:  patient lying in bed without any acute distress       Neuro Exam  Mental Status: More alert today, able to states his age, the month, knows he is in [facility]. Able to follows commands with encouragement. Able to count down from 20 (makes multiple mistakes but able to self-correct).  Cranial Nerves: Possible left sided neglect? Left facial droop. Dysarthria. Eyes midline, EOMI with normal smooth pursuit, facial sensation intact and symmetric, hearing not formally tested but intact to conversation, palate elevation symmetric and uvula midline  Motor: No drift in upper or lower extremities today.  Reflexes: Deferred  Sensory: Light touch sensation intact and symmetric throughout upper and lower extremities, no extinction on double simultaneous stimulation   Coordination:  Deferred  Station/Gait:  deferred    Stroke Scales     NIHSS  1a. Level of Consciousness 1-->Not alert, but arousable by minor " stimulation to obey, answer, or respond   1b. LOC Questions 0-->Answers both questions correctly   1c. LOC Commands 0-->Performs both tasks correctly   2.   Best Gaze 1-->Partial gaze palsy, gaze is abnormal in one or both eyes, but forced deviation or total gaze paresis is not present   3.   Visual 0-->No visual loss   4.   Facial Palsy 1-->Minor paralysis (flattened nasolabial fold, asymmetry on smiling) (Left facial droop)   5a. Motor Arm, Left 0-->No drift, limb holds 90 (or 45) degrees for full 10 secs   5b. Motor Arm, Right 0-->No drift, limb holds 90 (or 45) degrees for full 10 secs   6a. Motor Leg, Left 0-->No drift, leg holds 30 degree position for full 5 secs   6b. Motor Leg, right 0-->No drift, leg holds 30 degree position for full 5 secs   7.   Limb Ataxia 0-->Absent   8.   Sensory 0-->Normal, no sensory loss   9.   Best Language 0-->No aphasia, normal   10. Dysarthria 1-->Mild-to-moderate dysarthria, patient slurs at least some words and, at worst, can be understood with some difficulty   11. Extinction and Inattention  1-->Visual, tactile, auditory, spatial, or personal inattention or extinction to bilateral simultaneous stimulation in one of the sensory modalities   Total 5 (04/27/25 1041)       Imaging/Labs   (Bolded imaging and labs new and/or personally reviewed or re-reviewed by me today)    RI/Head CT CT head 4/16: Large right anterior temporal lobe mass 5.3 x 4 x 3.8 cm and associated hemorrhage posterior to mass. Significant vasogenic edema with right to left midline shift 6.6 mm.      CT head 4/22: Interval right frontopariteal and temporal cranioomy. Small hemorrhage and gas cavity. Stable vasogenic edema. Persistent mild effacement of right lateral ventricle.      CT head 4/23: Stable postop right craniotomy with small blood and bluid along margin. Unchanged mass in right temporal lob and vasogenic edema, and unchanged mass effect including trace leftward midline shift.      MRI brain 4/16:  "Large mass right temporal lobe with vasogenic edema measures 5.3x3.8x4.3. Right to left midline shift 4 to 5 mm.      MRI brain 4/23: Postop changes related to right pterional craniotomy in anterior right temporal lobe. 4.6 x 2.8 x 3 cm area may represent residual tumor. Slightly diminished edema in right temporal/frontal area. 4 mm right to left midline shift.       Intracranial Vasculature CTA head 4/17: NO LVO. Plaque in bilateral carotid siphons. Unchanged 6 mm leftward midline shift. Anterior displacement of right MCA due to right mass.    Cervical Vasculature CTA neck 4/17: Plaque in bilateral carotid bifurcation       Echocardiogram N/A   EKG/Telemetry Atrial fibrillation with LBBB    Additional Testing EEG Day 1: Showed electroencephalogram is abnormal due to the presences of background slow activity suggesting moderate encephalopathy and focal slowing in the right temporal region. No electrographic seizures or epileptiform discharges were recorded. There were two events of stimulus induced rhythmic periodic discharges that lacked the criteria to be diagnosed as seizure\"      EEG Day 2: Video electroencephalogram is abnormal due to the presences of background slow activity suggesting moderate encephalopathy and focal slowing in the right temporal region. No electrographic seizures or epileptiform discharges were recorded.       LDL  No lab value available in past 30 days   A1C  2/10/2025: 6.9 %  4/23/2025: 7.9 %   Troponin 4/22/2025: 53 ng/L     Other labs reviewed by me today:  Glucose, platelets    Time Spent on this Encounter   Billing: I personally examined and evaluated the patient today. At the time of my evaluation and management the patient was in critical condition today due to s/p right fronto-temporal craniotomy. I personally managed chart/image review. Key decisions made today included OK to transfer out ICU. I spent a total of 45 minutes providing critical care services, evaluating the patient, " directing care and reviewing laboratory values and radiologic reports.

## 2025-04-27 NOTE — PROGRESS NOTES
Pt transferred out of ICU to neurology floor. Report given to receiving RN, deficits include mild L facial droop and tongue deviation. Pt A&Ox4, follows commands. Strengths in BUE & BLE intact, pt denies n/t. Crani site TYRELL, CDI. Ambulating Ax1-2 and gait belt/walker. Son Harpreet at bedside, present for transfer.     Pt belongings sent to receiving unit, pt phone with other son not in hospital upon transfer.

## 2025-04-28 ENCOUNTER — APPOINTMENT (OUTPATIENT)
Dept: SPEECH THERAPY | Facility: CLINIC | Age: 85
DRG: 025 | End: 2025-04-28
Attending: NEUROLOGICAL SURGERY
Payer: COMMERCIAL

## 2025-04-28 ENCOUNTER — APPOINTMENT (OUTPATIENT)
Dept: PHYSICAL THERAPY | Facility: CLINIC | Age: 85
DRG: 025 | End: 2025-04-28
Attending: NEUROLOGICAL SURGERY
Payer: COMMERCIAL

## 2025-04-28 ENCOUNTER — PATIENT OUTREACH (OUTPATIENT)
Dept: CARE COORDINATION | Facility: CLINIC | Age: 85
End: 2025-04-28
Payer: COMMERCIAL

## 2025-04-28 LAB
ANION GAP SERPL CALCULATED.3IONS-SCNC: 11 MMOL/L (ref 7–15)
BUN SERPL-MCNC: 31.2 MG/DL (ref 8–23)
CA-I BLD-MCNC: 5.1 MG/DL (ref 4.4–5.2)
CALCIUM SERPL-MCNC: 9.8 MG/DL (ref 8.8–10.4)
CHLORIDE SERPL-SCNC: 101 MMOL/L (ref 98–107)
CREAT SERPL-MCNC: 0.83 MG/DL (ref 0.67–1.17)
EGFRCR SERPLBLD CKD-EPI 2021: 87 ML/MIN/1.73M2
ERYTHROCYTE [DISTWIDTH] IN BLOOD BY AUTOMATED COUNT: 14.7 % (ref 10–15)
GLUCOSE BLDC GLUCOMTR-MCNC: 114 MG/DL (ref 70–99)
GLUCOSE BLDC GLUCOMTR-MCNC: 141 MG/DL (ref 70–99)
GLUCOSE BLDC GLUCOMTR-MCNC: 143 MG/DL (ref 70–99)
GLUCOSE BLDC GLUCOMTR-MCNC: 146 MG/DL (ref 70–99)
GLUCOSE BLDC GLUCOMTR-MCNC: 147 MG/DL (ref 70–99)
GLUCOSE BLDC GLUCOMTR-MCNC: 147 MG/DL (ref 70–99)
GLUCOSE BLDC GLUCOMTR-MCNC: 159 MG/DL (ref 70–99)
GLUCOSE BLDC GLUCOMTR-MCNC: 161 MG/DL (ref 70–99)
GLUCOSE BLDC GLUCOMTR-MCNC: 162 MG/DL (ref 70–99)
GLUCOSE BLDC GLUCOMTR-MCNC: 168 MG/DL (ref 70–99)
GLUCOSE BLDC GLUCOMTR-MCNC: 179 MG/DL (ref 70–99)
GLUCOSE BLDC GLUCOMTR-MCNC: 217 MG/DL (ref 70–99)
GLUCOSE BLDC GLUCOMTR-MCNC: 217 MG/DL (ref 70–99)
GLUCOSE BLDC GLUCOMTR-MCNC: 247 MG/DL (ref 70–99)
GLUCOSE BLDC GLUCOMTR-MCNC: 276 MG/DL (ref 70–99)
GLUCOSE SERPL-MCNC: 178 MG/DL (ref 70–99)
HCO3 SERPL-SCNC: 26 MMOL/L (ref 22–29)
HCT VFR BLD AUTO: 30.9 % (ref 40–53)
HGB BLD-MCNC: 10.3 G/DL (ref 13.3–17.7)
MAGNESIUM SERPL-MCNC: 1.7 MG/DL (ref 1.7–2.3)
MCH RBC QN AUTO: 30.4 PG (ref 26.5–33)
MCHC RBC AUTO-ENTMCNC: 33.3 G/DL (ref 31.5–36.5)
MCV RBC AUTO: 91 FL (ref 78–100)
PHOSPHATE SERPL-MCNC: 2.5 MG/DL (ref 2.5–4.5)
PLATELET # BLD AUTO: 98 10E3/UL (ref 150–450)
POTASSIUM SERPL-SCNC: 3.9 MMOL/L (ref 3.4–5.3)
RBC # BLD AUTO: 3.39 10E6/UL (ref 4.4–5.9)
SODIUM SERPL-SCNC: 138 MMOL/L (ref 135–145)
WBC # BLD AUTO: 8.3 10E3/UL (ref 4–11)

## 2025-04-28 PROCEDURE — 99233 SBSQ HOSP IP/OBS HIGH 50: CPT | Performed by: INTERNAL MEDICINE

## 2025-04-28 PROCEDURE — 85041 AUTOMATED RBC COUNT: CPT | Performed by: STUDENT IN AN ORGANIZED HEALTH CARE EDUCATION/TRAINING PROGRAM

## 2025-04-28 PROCEDURE — 99207 PR NO BILLABLE SERVICE THIS VISIT: CPT | Performed by: NURSE PRACTITIONER

## 2025-04-28 PROCEDURE — 97530 THERAPEUTIC ACTIVITIES: CPT | Mod: GP

## 2025-04-28 PROCEDURE — 120N000013 HC R&B IMCU

## 2025-04-28 PROCEDURE — 84100 ASSAY OF PHOSPHORUS: CPT | Performed by: STUDENT IN AN ORGANIZED HEALTH CARE EDUCATION/TRAINING PROGRAM

## 2025-04-28 PROCEDURE — 250N000013 HC RX MED GY IP 250 OP 250 PS 637: Performed by: STUDENT IN AN ORGANIZED HEALTH CARE EDUCATION/TRAINING PROGRAM

## 2025-04-28 PROCEDURE — 250N000012 HC RX MED GY IP 250 OP 636 PS 637: Performed by: INTERNAL MEDICINE

## 2025-04-28 PROCEDURE — 36415 COLL VENOUS BLD VENIPUNCTURE: CPT | Performed by: STUDENT IN AN ORGANIZED HEALTH CARE EDUCATION/TRAINING PROGRAM

## 2025-04-28 PROCEDURE — 250N000013 HC RX MED GY IP 250 OP 250 PS 637: Performed by: NURSE PRACTITIONER

## 2025-04-28 PROCEDURE — 250N000011 HC RX IP 250 OP 636: Performed by: NURSE PRACTITIONER

## 2025-04-28 PROCEDURE — 92526 ORAL FUNCTION THERAPY: CPT | Mod: GN | Performed by: SPEECH-LANGUAGE PATHOLOGIST

## 2025-04-28 PROCEDURE — 83735 ASSAY OF MAGNESIUM: CPT | Performed by: STUDENT IN AN ORGANIZED HEALTH CARE EDUCATION/TRAINING PROGRAM

## 2025-04-28 PROCEDURE — 92610 EVALUATE SWALLOWING FUNCTION: CPT | Mod: GN | Performed by: SPEECH-LANGUAGE PATHOLOGIST

## 2025-04-28 PROCEDURE — 97116 GAIT TRAINING THERAPY: CPT | Mod: GP

## 2025-04-28 PROCEDURE — 82435 ASSAY OF BLOOD CHLORIDE: CPT | Performed by: STUDENT IN AN ORGANIZED HEALTH CARE EDUCATION/TRAINING PROGRAM

## 2025-04-28 PROCEDURE — 82330 ASSAY OF CALCIUM: CPT | Performed by: STUDENT IN AN ORGANIZED HEALTH CARE EDUCATION/TRAINING PROGRAM

## 2025-04-28 RX ADMIN — INSULIN GLARGINE 40 UNITS: 100 INJECTION, SOLUTION SUBCUTANEOUS at 20:25

## 2025-04-28 RX ADMIN — DEXAMETHASONE SODIUM PHOSPHATE 4 MG: 4 INJECTION, SOLUTION INTRAMUSCULAR; INTRAVENOUS at 20:25

## 2025-04-28 RX ADMIN — DEXAMETHASONE SODIUM PHOSPHATE 4 MG: 4 INJECTION, SOLUTION INTRAMUSCULAR; INTRAVENOUS at 09:11

## 2025-04-28 RX ADMIN — INSULIN GLARGINE 40 UNITS: 100 INJECTION, SOLUTION SUBCUTANEOUS at 13:50

## 2025-04-28 RX ADMIN — LEVETIRACETAM 1500 MG: 100 SOLUTION ORAL at 05:10

## 2025-04-28 RX ADMIN — INSULIN ASPART 5 UNITS: 100 INJECTION, SOLUTION INTRAVENOUS; SUBCUTANEOUS at 18:07

## 2025-04-28 RX ADMIN — LEVOTHYROXINE SODIUM 125 MCG: 125 TABLET ORAL at 09:11

## 2025-04-28 RX ADMIN — INSULIN ASPART 3 UNITS: 100 INJECTION, SOLUTION INTRAVENOUS; SUBCUTANEOUS at 13:51

## 2025-04-28 RX ADMIN — LEVETIRACETAM 1500 MG: 100 SOLUTION ORAL at 18:08

## 2025-04-28 RX ADMIN — SENNOSIDES AND DOCUSATE SODIUM 1 TABLET: 50; 8.6 TABLET ORAL at 20:25

## 2025-04-28 RX ADMIN — ATORVASTATIN CALCIUM 40 MG: 40 TABLET, FILM COATED ORAL at 09:11

## 2025-04-28 ASSESSMENT — ACTIVITIES OF DAILY LIVING (ADL)
ADLS_ACUITY_SCORE: 54
ADLS_ACUITY_SCORE: 54
ADLS_ACUITY_SCORE: 50
ADLS_ACUITY_SCORE: 51
ADLS_ACUITY_SCORE: 51
ADLS_ACUITY_SCORE: 50
ADLS_ACUITY_SCORE: 51
ADLS_ACUITY_SCORE: 51
ADLS_ACUITY_SCORE: 54
ADLS_ACUITY_SCORE: 54
ADLS_ACUITY_SCORE: 51
ADLS_ACUITY_SCORE: 54
ADLS_ACUITY_SCORE: 51
ADLS_ACUITY_SCORE: 54
ADLS_ACUITY_SCORE: 54
ADLS_ACUITY_SCORE: 51
ADLS_ACUITY_SCORE: 54

## 2025-04-28 NOTE — CODE/RAPID RESPONSE
"[facility]    Fall Note  4/28/2025   Time Called: 2:19 PM    I was called to evaluate Pascual Perea, a 84 year old male following a fall. 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  Post op course c/b seizures, ARF reqauiring reintubation, dysphagia      Pt was reaching for his cane, he reports that his left knee gave way and he \"sat down\".  Patient denies any head strike or loss of consciousness.  This was an unwitnessed fall.  On staff arrival who were alerted by the sound in bed alarm patient was awake.  He has the antislip socks in place.  Patient is oriented x 4 though he reports he \"thought he was in his house\".  RN tells me he just woke up from a nap recently.. RN staff deny any seizure like activity.   He denies any pain reports he landed on his butt.  Denies any dyspnea chest pain or other similar antecedent symptoms.  No back pain.  Patient reports reports the left knee has been giving him trouble for several years.At present the patient is not on anticoagulation, or antiplatelet therapy.  Patient is returned to bed via ceiling left    Impression  Fall, unwitnessed, in a pt who is not on anticoagulation, antiplatelet therapy    Plan:  -glucose WNL  -vs WNL  -neurosurgery team notified by me  -continue q4h neuro checks  -holding off on neuro imaging unless there's a change in his neuro exam  -I will call pt's family, spoke with patient's son by phone and informed him of the fall event  - I have spoken with " the hospitalist attending physician informed her of the afternoon's events  - Consult PT, suspect they are seeing patient as ARU is recommended    Code Status: Full Code    Temp: 98.5  F (36.9  C) Temp src: Oral BP: 126/53 Pulse: 71   Resp: 20 SpO2: 100 % O2 Device: None (Room air)       Physical Exam     Exam:  Constitutional: vs as per EMR  General:  adult sitting on the floor next to his bed without acute distress  Neuro: +follows commands wiggle toes and show 2 fingers bilat, face symmetric, tongue midline, speech fluent  ,  Head, ENT & mouth: NC/AT,  mouth moist oral mucosa, right temporal parietal semicircle incision  Neck: supple  resp: Speaking in full sentences  gi: Deferred  Ext: no edema  Skin: no rashes on exposed skin  Musculoskeletal no bony joint deformities, rull ROM and no pain on palpation of all large joint articulations.     Not all injuries from a fall are obvious on initial exam, please to not hesitate to call for reassessment by House provider should the patient's clinical status change, report new pain or symptoms.     Total time 25 minutes 230-255 PM    JAKI Urbano [facility]  Hospitalist Service  [facility]  Securely message with [employer] (more info)  Text page via [employer] Paging/Directory

## 2025-04-28 NOTE — PROGRESS NOTES
Reason for Admission: POD #5 crani     Cognitive/Mentation: A/Ox 4  Neuros/CMS: Intact ex slight left facial droop  VS: BP<140.   Tele: A-fib.  /GI: incontinent at times. Last BM today 4/27.   Pulmonary: LS clear.  Pain: denied.     Drains/Lines: Insulin gtt @ Alg 4, 10 ml/hr. TKO at 10 ml/hr. TF running at 50 ml/hr.  Skin: Incision is TYRELL  Activity: Assist x 1 with GB and cane.  Diet: NPO. Takes pills crushed in NJ.     Therapies recs: pending  Discharge: pending    Aggression Stoplight Tool: green    End of shift summary: Plan for speech therapy to assess tomorrow.

## 2025-04-28 NOTE — PLAN OF CARE
Pt here with POD 6 R Crani. A&Ox4. Neuros L droop. VSS<140. Tele Afib CVR. NPO diet. TF GR @ 50ml/hr 30ml FWF q4. Takes pills crushed in NG. Up with A1GB/Cane. Denies pain. Pt scoring green on the Aggression Stop Light Tool. Crani site WDL/TYRELL. Insulin gtt @ Alg 4, 4u/hr. TKO 10ml/hr. Discharge pending.

## 2025-04-28 NOTE — PROGRESS NOTES
04/28/25 0914   Appointment Info   Signing Clinician's Name / Credentials (SLP) Maria T Hilton MS CCC SLP   General Information   Onset of Illness/Injury or Date of Surgery 04/22/25   Referring Physician Davie Erickson   Patient/Family Therapy Goal Statement (SLP) Patient wants to eat and drink.   Pertinent History of Current Problem 84-year-old gentleman status postcraniotomy complicated by postop respiratory failure and seizures who has been extubated for greater than 24 hours with no complaints.  Overall he is progressing as expected he is awake alert oriented to person place and time.  He has some subtle left-sided weakness.  He has an adequate cough and is able to protect his airway   General Observations Pleasant and cooperative   Type of Evaluation   Type of Evaluation Swallow Evaluation   Oral Motor   Oral Musculature anomalies present   Structural Abnormalities none present   Mucosal Quality adequate   Dentition (Oral Motor)   Dentition (Oral Motor) natural dentition;adequate dentition   Facial Symmetry (Oral Motor)   Facial Symmetry (Oral Motor) WNL   Lip Function (Oral Motor)   Lip Range of Motion (Oral Motor) retraction impairment   Retraction, Lip Range of Motion left side;minimal impairment   Tongue Function (Oral Motor)   Tongue Coordination/Speed (Oral Motor) slows down progressively   Tongue ROM (Oral Motor) elevation is impaired   Elevation, Tongue ROM Impairment (Oral Motor) bilateral;minimal impairment   Jaw Function (Oral Motor)   Jaw Function (Oral Motor) WNL   Cough/Swallow/Gag Reflex (Oral Motor)   Soft Palate/Velum (Oral Motor) WNL   Volitional Throat Clear/Cough (Oral Motor) impaired;reduced strength   Volitional Swallow (Oral Motor) WNL   Vocal Quality/Secretion Management (Oral Motor)   Vocal Quality (Oral Motor) hoarse  (minimal)   Secretion Management (Oral Motor) WNL   General Swallowing Observations   Past History of Dysphagia No documented history   Respiratory Support room air    Current Diet/Method of Nutritional Intake (General Swallowing Observations, NIS) NPO;nasogastric tube (NG)   Swallowing Evaluation Clinical swallow evaluation   Clinical Swallow Evaluation   Feeding Assistance set up only required   Clinical Swallow Evaluation Textures Trialed thin liquids;pureed;solid foods   Clinical Swallow Eval: Thin Liquid Texture Trial   Mode of Presentation, Thin Liquids cup;straw;self-fed;fed by clinician   Volume of Liquid or Food Presented 8 oz of water   Oral Phase of Swallow premature pharyngeal entry   Pharyngeal Phase of Swallow impaired;throat clearing;repeated swallows   Diagnostic Statement Throat clearing via the straw and none via the cup.   Clinical Swallow Evaluation: Puree Solid Texture Trial   Mode of Presentation, Puree spoon;self-fed   Volume of Puree Presented 4 oz of apple sauce   Oral Phase, Puree WFL   Pharyngeal Phase, Puree intact   Diagnostic Statement No sx of aspiration or reported globus sensation.   Clinical Swallow Evaluation: Solid Food Texture Trial   Mode of Presentation self-fed   Volume Presented 1 soco cracker   Oral Phase impaired mastication;residue in oral cavity;premature pharyngeal entry   Oral Residue mid posterior tongue   Pharyngeal Phase impaired;repeated swallows   Diagnostic Statement Mildly prolonged mastication with mild to moderate oral residue after the swallow.   Esophageal Phase of Swallow   Patient reports or presents with symptoms of esophageal dysphagia No   Swallowing Recommendations   Diet Consistency Recommendations minced & moist (level 5);thin liquids (level 0)   Supervision Level for Intake close supervision needed   Mode of Delivery Recommendations bolus size, small;no straws;slow rate of intake   Postural Recommendations none   Swallowing Maneuver Recommendations alternate food and liquid intake;extra swallow   Monitoring/Assistance Required (Eating/Swallowing) check mouth frequently for oral residue/pocketing;stop eating  activities when fatigue is present;monitor for cough or change in vocal quality with intake   Recommended Feeding/Eating Techniques (Swallow Eval) maintain upright sitting position for eating;maintain upright posture during/after eating for 30 minutes;minimize distractions during oral intake;set-up and prepare tray   Medication Administration Recommendations, Swallowing (SLP) [place] in apple sauce or via the tube.   Instrumental Assessment Recommendations VFSS (videofluoroscopic swallowing study)   Comment, Swallowing Recommendations Pending tolerance and ability to asdvance.   General Therapy Interventions   Planned Therapy Interventions Dysphagia Treatment   Dysphagia treatment Modified diet education;Instruction of safe swallow strategies   Clinical Impression   Criteria for Skilled Therapeutic Interventions Met (SLP Eval) Yes, treatment indicated   SLP Diagnosis Moderate oral and pharyngeal dysphagia   Risks & Benefits of therapy have been explained evaluation/treatment results reviewed;care plan/treatment goals reviewed;risks/benefits reviewed;current/potential barriers reviewed;participants voiced agreement with care plan;participants included;patient;son   Clinical Impression Comments Patient presents with moderate oral and pharyngeal dysphagia at bedside. Patient with minimal oral deficts on the left side. He demonstrated premature entry of thin liquids with throat clearing via the straw. Improved tolerance via the cup with single swallows. Mastication was mildly prolonged for a solid with decreased AP movement and mild to moderate oral residue after the swallow that was cleared with additional swallow followed by a liquid rinse. Patient increases his aspiration risk due to impulsive drinking/eating.     Recommend: 1. IDDSI level 5 minced/moist and thin liquids. 2. Upright, no straws, small bites/sips, slow rate, check for oral residue and alternate liquids/solids. Hold diet if sx of aspiration present or  changes in his respiratory status.     SLP Total Evaluation Time   Eval: oral/pharyngeal swallow function, clinical swallow Minutes (53126) 18   SLP Goals   Therapy Frequency (SLP Eval) 5 times/week   SLP Predicted Duration/Target Date for Goal Attainment 05/10/25   SLP Goals Swallow   SLP: Safely tolerate diet without signs/symptoms of aspiration Regular diet;Thin liquids;With use of swallow precautions;With assistance/supervision   Interventions   Interventions Quick Adds Swallowing Dysfunction   Swallowing Intervention   Treatment of Swallowing Dysfunction &/or Oral Function for Feeding Minutes (77149) 10   Symptoms Noted During/After Treatment None   Treatment Detail/Skilled Intervention Patient and son provided education on the sx of aspiration and rationale for modified diet with swallow strategies. Patient intermittently following swallow strategies with mod/max cues.   SLP Discharge Planning   SLP Plan Meal f/u level 5 and thins advance as tolerate or VFSS if not.   SLP Discharge Recommendation Acute [facility]hab [facility]-Motivated patient will benefit from intensive, interdisciplinary therapy.  Anticipate will be able to tolerate 3 hours of therapy per day   SLP Rationale for DC Rec Patient's swallow function is below his baseline.   SLP Brief overview of current status  Moderate oral and pharyngeal dysphagia monitor closely for early sx of aspiration.    SLP Time and Intention   Total Session Time (sum of timed and untimed services) 28

## 2025-04-28 NOTE — PROGRESS NOTES
Reason for Admission: POD #6 crani      Cognitive/Mentation: A/Ox 4  Neuros/CMS: Intact ex slight left facial droop.  VS: BP<140.   Tele: A-fib.  /GI: Continent- using bedside commode this evening. Last BM today 4/28.   Pulmonary: LS clear.  Pain: denied.      Drains/Lines: Insulin gtt discontinued this afternoon.TF also stopped once Pt received diet.    Skin: Incision is TYRELL  Activity: Assist x 1 with GB and cane.  Diet: Minced and moist with thin. Takes pills crushed in NJ or orally depending on medication. If diet progresses NJ will be pulled tomorrow 4/29.     Therapies recs: pending  Discharge: pending     Aggression Stoplight Tool: green     End of shift summary: Pt had a significant event this afternoon- review provider note.     ** RN:Heads up he has had 8, 2 second pauses in the last 40 minutes... not sure if we wanted to consult cards on this or not?    MD:Thanks for letting me know. A two second pause will not generate any interest by Cardiology. I will review his medications.    It looks like he is not on any AV noodle blocking medications and potassium was normal this afternoon. I added on a magnesium for tomorrow morning. Please continue to monitor and notify on call MD if any concerns. Thank you again.

## 2025-04-28 NOTE — PROGRESS NOTES
"CLINICAL NUTRITION SERVICES - REASSESSMENT NOTE         Registered Dietitian Interventions:  1) Finish out current EN bag and then turn off  2) Added Room Service with Assist - he will be seen daily for meal ordering assistance  3) Low CHO supplements     Keep FT in for now - will reassess tomorrow ability to pull  Reviewed plan with bedside RN           CURRENT NUTRITION ORDERS  Diet: Minced/Moist, Thin Liquids    Nutrition Support:   Novasource Renal @ 50 ml/hr x 22 hrs (hold for synthroid)  Provides:      2200 kcal/daily (27 kcal/kg)     100 gm/pro/daily (1.2 gm/kg)     201 g CHO     789 mL/FW  FWF orders: 30 ml every 4 hours (180 ml/daily) or per provider.  Total daily fluids: 969 ml/daily    CURRENT INTAKE/TOLERANCE  Chart reviewed    4/28: SLP --> Moderate oral and pharyngeal dysphagia.  Minced/Moist, Thin Liquids.    Spoke with bedside RN - pt ate 100% of his breakfast (320 cals, 20 gm pro) and asked when his diet would be advanced to a regular diet  Spoke with MD (Dr Ly) - plan to finish out current EN bag and then turn off.  Will reassess po intake tomorrow and ability to pull FT     NEW FINDINGS  Nutrition Admission Screen:  Have you recently lost weight without trying? \"NO\"  Have you been eating poorly because of a decreased appetite? \"NO\"      GI symptoms: BM x1 this morning      Skin/wounds: no documented pressure injuries       Nutrition-relevant labs: Reviewed    Nutrition-relevant medications: Synthroid     Weight: wt fluctuations, no significant loss  Wt Readings from Last 10 Encounters:   04/26/25 83.4 kg (183 lb 13.8 oz)   04/16/25 83.9 kg (185 lb)   04/09/25 86.2 kg (190 lb)   02/17/25 86.2 kg (190 lb)   02/14/25 87.8 kg (193 lb 9.6 oz)   01/02/25 85.7 kg (189 lb)   12/27/24 83 kg (183 lb)   12/24/24 86.1 kg (189 lb 13.1 oz)   12/09/24 88.6 kg (195 lb 6.4 oz)   11/13/24 84.4 kg (186 lb)         ASSESSED NUTRITION NEEDS  Dosing wt: 83.8 kg  Energy: 9825-6046 kcals/day (25 - 30 kcals/kg of " admission wt)  Justification:  Post operative, maintenance  Protein: 100-126 grams protein/day (1.2 - 1.5 grams of pro/kg of admission wt)  Justification: Surgical recovery    MALNUTRITION  % Intake: Decreased intake does not meet criteria - has been receiving nutrition via EN, now tolerating po  % Weight Loss: None noted  Subcutaneous Fat Loss:  deferred  Muscle Loss:  deferred  Fluid Accumulation/Edema: None noted  Malnutrition Diagnosis: Patient does not meet two of the established criteria necessary for diagnosing malnutrition  Malnutrition Present on Admission: No    EVALUATION OF THE PROGRESS TOWARD GOALS   Previous Goals (4/25)  TF to meet % of estimated nutrient needs.   Evaluation: Met      Previous Nutrition Diagnosis (5/25)  Inadequate oral intakes  related to  acute respiratory illness  as evidenced by NPO and enteral support.   Evaluation: Improving    NUTRITION DIAGNOSIS  No nutrition diagnosis at this time     INTERVENTIONS  1) Finish out current EN bag and then turn off  2) Added Room Service with Assist - he will be seen daily for meal ordering assistance  3) Low CHO supplements     Keep FT in for now - will reassess tomorrow ability to pull      GOALS  Pt to consume 75% meals     MONITORING/EVALUATION  Progress toward goals will be monitored and evaluated per policy.

## 2025-04-28 NOTE — CARE PLAN
04/28/25 1430   Fall Event   Patient Assessed By nurse;provider   Name of Provider Notified CASIMIRO Black   Family/Designated Caregiver Notified of Fall Yes   Fall Prevention Plan Updated N/a   Name of Family/Designated Caregiver Notified Harpreet

## 2025-04-28 NOTE — PROGRESS NOTES
Rehab Admissions:  Met with pt regarding [facility] referral and discussed location, contact info, visitor policy, what to bring, ELOS, room accommodations, meals, medications, the intensive rehab program and medical management. Pt reports he lives in a Sr Apt with his son, and there is elevator access. He reports being (I) with use of a cane, and that he previously drove. He reports his son works but otherwise is home and able to assist him. He asked thoughtful questions and interacted appropriately. Left a brochure for son to look at when he visits. Pt reports being agreeable to [facility] program but would like to talk about it with his son first.     Thank you for the referral, we will continue to follow this patient for post acute placement.     Determination of admission is based upon the patient's need for an intensive, interdisciplinary approach to rehabilitation, their ability to progress, their ability to tolerate intensive therapies, their need for daily physician supervision, their need for twenty four hour nursing assistance, and their ability and willingness to participate in such a program.    Clari Green CM  Rehab Liaison/  [facility]  4/28/2025    2:01 PM

## 2025-04-28 NOTE — PROGRESS NOTES
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 Admitted on 4/16/2025.      Brain mass with hemorrhage, vasogenic edema   High grade neuroendocrine tumor, large cell type, left parotid gland s/p chemoradiation   *Presents with increased lethargy, somnolence, word-finding and memory difficulty, generalized weakness in legs   *CT head with 5.3 x 4 x 3.8 cm right temporal lobe mass with associated hemorrhagic aspect, significant surrounding vasogenic edema, right to left midline shift 6.6 mm  *MRI brain with 5.3 x 3.8 x 4.3 cm right temporal lobe mass with extensive surrounding vasogenic edema, hemorrhagic signal within the mass, right to left midline shift 4 to 5 mm  *Hx of neuroendocrine tumor as above, follows with [facility] (Dr. Butler), felt to be in remission  Neurosurgery consult requested.  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  Please see cancer treatment history outlined by Dr. Bravo in his note from 4/23  Neurochecks, steroids, wound cares, pain control per neurosurgery  AEDs per general neurology, now on Keppra 1500 mg twice daily  Goal SBP <150, PRN IV labetalol, hydralazine ordered    Moderate oral and pharyngeal dysphagia  Now on IDDSI level 5 (minced/moist) diet and thin liquids  Continue swallowing treatments with SLP  Discussed with nutrition.  Patient is eating well, ate 100% of his tray and is asking for more food.  Taper and discontinue tube feedings today, leave enteral tube in place for now.    Coronary artery disease status post CABG  LVH  Mild troponin  elevation  Troponin 42->45. Denies chest pain. EKG reviewed by cardiology initially due to ST elevations in setting of repolarization abnormality, not felt to be ischemic. Likely secondary to brain mass/bleed.   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 Creatinine 1.22 on admission.   Currently around baseline  Avoid nephrotoxins  Monitor BMP      Diabetes mellitus, type 2, on long term insulin   HgbA1c 6.9% 2/10/25. Prior to admission on glargine 40 units BID, aspart 3 units per carb unit.  Remains on insulin gtt, 6 units/hr  We discussed diabetes management.  He says his diabetes was diagnosed 35 years ago, he checks his own blood sugars and recognizes symptoms of hyper and hypoglycemia.  He was able to describe what he would do if his blood sugar was low.  He recalls his prior to admission dose of basal insulin.  Discontinue insulin drip, resume PTA basal insulin, Lantus 40 units twice daily and PTA prandial insulin at slightly lower dose, 1 unit per 15 g of carbohydrate with high scale corrective dose insulin  Hypoglycemia protocol     Hyperlipidemia  Continue PTA atorvastatin, fenofibrate      Hypothyroidism  Continue PTA levothyroxine           Diet: Adult Formula Drip Feeding: Continuous Novasource Renal; Orogastric tube; Goal Rate: 50 ml/hr x 22 hours (hold x2 hours for levothyroxine dose); mL/hr; Start TF @ 10 mL/hr. Advance by 10 ml q 12 hours until goal rate reached. Hold x2 hours daily f...  Combination Diet Minced and Moist Diet (level 5); Thin Liquids (level 0) (No straws, supervision due to impulsivenss, slow rate, small bites/sips, alternate liquids/solids. Hold diet if increased coughing or changes in his respiratory status.)    DVT Prophylaxis: Pneumatic Compression Devices  Duran Catheter: Not present  Lines: None     Cardiac Monitoring:  "ACTIVE order. Indication: ICU  Code Status: Full Code      Clinically Significant Risk Factors               # Hypoalbuminemia: Lowest albumin = 3.3 g/dL at 4/23/2025  5:31 PM, will monitor as appropriate   # Thrombocytopenia: Lowest platelets = 77 in last 2 days, will monitor for bleeding   # Hypertension: Noted on problem list           # DMII: A1C = 7.9 % (Ref range: <5.7 %) within past 6 months   # Overweight: Estimated body mass index is 25.64 kg/m  as calculated from the following:    Height as of this encounter: 1.803 m (5' 11\").    Weight as of this encounter: 83.4 kg (183 lb 13.8 oz).      # Financial/Environmental Concerns:           Social Drivers of Health    Food Insecurity: Unknown (4/22/2025)    Food Insecurity     Within the past 12 months, did you worry that your food would run out before you got money to buy more?: Patient unable to answer     Within the past 12 months, did the food you bought just not last and you didn t have money to get more?: Patient unable to answer   Housing Stability: Unknown (4/22/2025)    Housing Stability     Do you have housing? : Patient unable to answer     Are you worried about losing your housing?: Patient unable to answer   Tobacco Use: Medium Risk (2/17/2025)    Patient History     Smoking Tobacco Use: Former     Smokeless Tobacco Use: Never   Financial Resource Strain: Unknown (4/22/2025)    Financial Resource Strain     Within the past 12 months, have you or your family members you live with been unable to get utilities (heat, electricity) when it was really needed?: Patient unable to answer   Transportation Needs: Unknown (4/22/2025)    Transportation Needs     Within the past 12 months, has lack of transportation kept you from medical appointments, getting your medicines, non-medical meetings or appointments, work, or from getting things that you need?: Patient unable to answer   Physical Activity: Insufficiently Active (2/9/2025)    Exercise Vital Sign     " "Days of Exercise per Week: 7 days     Minutes of Exercise per Session: 20 min   Social Connections: Unknown (2/9/2025)    Social Connection and Isolation Panel [NHANES]     Frequency of Social Gatherings with Friends and Family: Three times a week          Disposition Plan     Medically Ready for Discharge: Anticipated in 2-4 Days         Hilaria Ly MD  Hospitalist Service  [facility]  Securely message with [employer] (more info)  Text page via [employer] Paging/Directory   ______________________________________________________________________    Interval History   \"This hurts (points to incision) because I like to lay on my left side.\"  Patient denies headache, no respiratory or GI complaints.  He says his left knee sometimes \"gives out, but I catch myself.\"    Physical Exam   Vital Signs: Temp: 98.5  F (36.9  C) Temp src: Oral BP: 128/56 Pulse: 63   Resp: 20 SpO2: 98 % O2 Device: None (Room air) Oxygen Delivery: 2 LPM  Weight: 183 lbs 13.82 oz    Constitutional: Awake, alert, cooperative, no apparent distress.  He is hard of hearing.  HEENT:  long incision right frontotemporal scalp without redness or drainage  Respiratory: Clear to auscultation bilaterally, no crackles or wheezing  Cardiovascular: Irregularly irregular rhythm with murmur  GI: Normal bowel sounds, soft, non-distended, non-tender  Skin/Integumen: No rash on exposed skin.  No lower extremity edema  Other: Mood is very pleasant.      Medical Decision Making       50 MINUTES SPENT BY ME on the date of service doing chart review, history, exam, documentation & further activities per the note.   Including discussion with patient and bedside RN, also nutritionist    Data     I have personally reviewed the following data over the past 24 hrs:    N/A  \   N/A   / 83 (L)     N/A N/A N/A /  179 (H)   N/A N/A N/A \       Imaging results reviewed over the past 24 hrs:   No results found for this or any previous visit (from the past 24 " hours).

## 2025-04-28 NOTE — PROGRESS NOTES
Neurosurgery progress note    Platelet count today pending.  Speech therapy saw the patient today and is passing his diet.  Patient states he is feeling relatively well.  Denies headache.    Exam    Alert and oriented no acute distress  Moving all extremities  Finger-nose slow and accurate  Negative pronator drift  Extraocular movements intact  Pupils equal and reactive    Cranial incision clean dry and intact    Assessment    Postop day 6 status post right frontotemporal craniotomy, performed with Dr. Erickson, pathology revealed metastatic small cell carcinoma.      Plan    If drop <75k, should transfuse for goal platelets >75k.  CBC today pending     -7th floor q4H neuro checks    -Pain control  -Decadron 2 weeks to off orders in   -Seizure meds per neurology, consult with general neurology, currently on Keppra 1500 mg twice daily  -Hospitalist consult for medical comanagement now that patient is out of ICU  -Speech therapy consult  -Wound cares. May leave open to air  -[facility] following

## 2025-04-28 NOTE — CONSULTS
Neurology Consult Note  [facility]       [2025]                                                                                       Admission Date: 2025  Hospital Day: 7      Patient: Pascual Perea      : 1940  MRN:  [id number]     CC:    No chief complaint on file.      Consult Request:  Referring Provider:  Davie Erickson MD  Primary Care Provider:  Andrew Elizalde MD        HPI:  Pascual Perea is a 84 year old yo male admitted for lethargy and somnolence.  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  Neurology was consulted for seizure management.    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  He presented with generalized weakness and fatigue for 2 weeks.  MRI of the brain showed a large right temporal lobe mass and associated hemorrhage.  He was discharged on  and returned on 2022 for right temporal craniotomy.  Postoperatively he had a witnessed seizure and cardiopulmonary arrest.  EEG showed right focal slowing and right epileptic discharges.  He was started on Keppra.  No seizures on EEG following that so discontinued EEG on .  Per the last note from  patient was improving and neurology had signed off in the ICU.  Patient has now moved to the floor and so a general neurology consult was made.  At baseline following surgery he has mild left facial droop, tongue deviation, and is ambulating with a gait belt/walker.        PAST MEDICAL HISTORY:  ALLERGIES:   Allergies   Allergen Reactions    Omeprazole      diarrhea    Pantoprazole      diarrhea     Tobacco:    History   Smoking Status    Former    Types: Cigarettes, Cigars, Pipe   Smokeless Tobacco    Never     Alcohol:  Social History    Substance and Sexual  Activity      Alcohol use: Not Currently        Comment: couple drinks a year    MEDICATIONS:       CURRENTLY SCHEDULED MEDICATIONS   Current Facility-Administered Medications   Medication Dose Route Frequency Provider Last Rate Last Admin    atorvastatin (LIPITOR) tablet 40 mg  40 mg Oral or Feeding Tube Daily Leydi Meyers MD   40 mg at 04/28/25 0911    dexAMETHasone (DECADRON) injection 4 mg  4 mg Intravenous Daily Nelly Coon NP        Followed by    [START ON 5/1/2025] dexAMETHasone (DECADRON) injection 2 mg  2 mg Intravenous Daily Nelly Coon NP        Followed by    [START ON 5/4/2025] dexAMETHasone (DECADRON) injection 1 mg  1 mg Intravenous Daily Nelly Coon NP        insulin aspart (NovoLOG) injection (RAPID ACTING)  1-10 Units Subcutaneous TID AC Hilaria Ly MD        insulin aspart (NovoLOG) injection (RAPID ACTING)  1-7 Units Subcutaneous At Bedtime Hilaria Ly MD        insulin aspart (NovoLOG) injection (RAPID ACTING)   Subcutaneous TID w/meals Hilaria Ly MD        insulin glargine (LANTUS PEN) injection 40 Units  40 Units Subcutaneous BID Hilaria Ly MD        isosorbide mononitrate (IMDUR) 24 hr tablet 30 mg  30 mg Oral Daily Hilaria Ly MD        levETIRAcetam (KEPPRA) oral solution 1,500 mg  1,500 mg Oral or Feeding Tube Q12H Estefany Lopez MD   1,500 mg at 04/28/25 0510    levothyroxine (SYNTHROID/LEVOTHROID) tablet 125 mcg  125 mcg Oral or Feeding Tube Daily Leydi Meyers MD   125 mcg at 04/28/25 0911    polyethylene glycol (MIRALAX) Packet 17 g  17 g Oral or Feeding Tube Daily Nelyl Coon NP   17 g at 04/25/25 0802    senna-docusate (SENOKOT-S/PERICOLACE) 8.6-50 MG per tablet 1 tablet  1 tablet Oral or Feeding Tube BID Nelly Coon NP   1 tablet at 04/27/25 0859    sodium chloride (PF) 0.9% PF flush 3 mL  3 mL Intracatheter Q8H BOYD Nelly Coon NP   3 mL at 04/26/25 1518          HOME  MEDICATIONS  Medications Prior to Admission   Medication Sig Dispense Refill Last Dose/Taking    Artificial Saliva (ACT DRY MOUTH) LOZG Take 1 lozenge by mouth at bedtime.   More than a month    atorvastatin (LIPITOR) 40 MG tablet TAKE 1 TABLET (40 MG) BY MOUTH DAILY 90 tablet 2 4/21/2025    azelastine 137 MCG/SPRAY SOLN [place] 2 sprays into both nostrils 2 times daily. (Patient taking differently: Spray 2 sprays into both nostrils 2 times daily as needed (runny nose).) 30 mL 1 Past Week    cyanocobalamin (VITAMIN B-12) 1000 MCG tablet Take 1 tablet (1,000 mcg) by mouth daily.   4/21/2025    dexAMETHasone (DECADRON) 4 MG tablet Take 1 tablet (4 mg) by mouth every 12 hours. 10 tablet 0 4/21/2025    fenofibrate micronized (LOFIBRA) 67 MG capsule TAKE 1 CAPSULE BY MOUTH EVERY MORNING BEFORE BREAKFAST 90 capsule 3 4/21/2025    Insulin Aspart FlexPen 100 UNIT/ML SOPN INJECT UNDER THE SKIN 3 TIMES PER DAY (BEFORE MEALS) 3 UNITS PER CARB CHOICE (15GM) APPROX 10-15 UNITS/MEAL PLUS PRIMING 15 mL 4 4/21/2025    insulin glargine (LANTUS SOLOSTAR) 100 UNIT/ML pen Inject 40 Units subcutaneously 2 times daily. 30 mL 3 4/21/2025    isosorbide mononitrate (IMDUR) 30 MG 24 hr tablet Take 1 tablet (30 mg) by mouth daily 90 tablet 3 4/21/2025    levETIRAcetam (KEPPRA) 500 MG tablet Take 1 tablet (500 mg) by mouth 2 times daily. 30 tablet 0 4/21/2025    levothyroxine (SYNTHROID/LEVOTHROID) 125 MCG tablet Take 1 tablet (125 mcg) by mouth daily 90 tablet 3 4/21/2025    polyethylene glycol 400 (BLINK TEARS) 0.25 % SOLN ophthalmic solution Place 1 drop into both eyes daily.   4/21/2025    blood glucose (ACCU-CHEK NORMAN PLUS) test strip USE TO TEST BLOOD SUGAR 3 TIMES A DAY OR AS DIRECTED 100 strip 2     blood glucose calibration (ACCU-CHEK NORMAN) solution USE AS DIRECTED 1 each 0     chlorhexidine (HIBICLENS) 4 % solution Apply topically See Admin Instructions. The night before surgery, take shower as you normally would. Then apply 1/2  bottle as body wash from neck down, avoid groin, let sit for 1 minute, and rinse off. Repeat the morning of surgery. 236 mL 0     Continuous Glucose  (FREESTYLE DELFINA 2 READER) CHRYSTAL 1 Device continuously. 1 each 1     Continuous Glucose Sensor (FREESTYLE DELFINA 2 SENSOR) MIS APPLY 1 SENSOR AND CHANGE EVERY 14 DAYS AS DIRECTED 6 each 3     insulin pen needle (GLOBAL EASE INJECT PEN NEEDLES) 31G X 5 MM miscellaneous Use one pen needle 5 times a day 500 each 3      MEDICAL HISTORY  Past Medical History:   Diagnosis Date    Adhesive capsulitis of shoulder     Allergic rhinitis 01/07/2008    Problem list name updated by automated process. Provider to review      Anemia 03/10/2014    Anemia, unspecified     Antiplatelet or antithrombotic long-term use     Arrhythmia     Atrial fib/flutter    Atrial fibrillation, unspecified type (H) 05/02/2018    CAD (coronary artery disease)     Chemotherapy-induced neutropenia 07/19/2024    Coronary artery disease involving native coronary artery of native heart without angina pectoris 02/16/2025    Coronary atherosclerosis     Nuc Stress 4/15/16: On both stress and rest images there is a very small size mild intensity apical photopenic defect which is of equal extent and intensity on both stress and rest images. Gated images demonstrated no regional wall motion abnormalities. The left ventricular ejection fraction was calculated to be 61% with stress.      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    History of cardioversion 04/24/2018    a single synchronized shock of 120 joules restored normal sinus rhythm    History of cardioversion 02/13/2019    single shock , 200 joules successul in restoring NSR    History of colonic polyps 03/24/2017    Hyperlipidemia LDL goal <70 05/26/2011    Hypertension      Hypothyroidism     Hypothyroidism, unspecified type 08/15/2016    Impotence of organic origin     Laceration of finger 06/2010     left thumb s/p sutures    Numbness and tingling     diabetic neuropathy bilateral feet    BRANDON (obstructive sleep apnea)     intolerant of CPAP, uses it occasionally.  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 disease, with long-term current use of insulin (H) 02/16/2025    Type 2 diabetes mellitus without complication  (goal A1C<8) 10/24/2015    Typical atrial flutter (H) 04/18/2016    Unspecified hypothyroidism     Vitamin D deficiency 09/03/2011     SURGICAL HISTORY  Past Surgical History:   Procedure Laterality Date    ANESTHESIA CARDIOVERSION N/A 02/13/2019    Procedure: ANESTHESIA CARDIOVERSION;  Surgeon: GENERIC ANESTHESIA PROVIDER;  Location:  OR    ANESTHESIA CARDIOVERSION N/A 05/22/2019    Procedure: ANESTHESIA, FOR CARDIOVERSION;  Surgeon: GENERIC ANESTHESIA PROVIDER;  Location:  OR    CARDIAC SURGERY      bypass in 1997    CARDIOVERSION  04/24/2018    COLONOSCOPY      CORONARY ANGIOGRAPHY ADULT ORDER      4/2/2012    CORONARY ARTERY BYPASS  1997    [facility] [place] [place] to [facility]    ENDOSCOPIC ULTRASOUND UPPER GASTROINTESTINAL TRACT (GI) N/A 03/14/2019    Procedure: ENDOSCOPIC ULTRASOUND OF UPPER GASTROINTESTINAL TRACT;  Surgeon: Ju Torres MD;  Location:  OR    ESOPHAGOSCOPY, GASTROSCOPY, DUODENOSCOPY (EGD), COMBINED N/A 4/15/2022    Procedure: ESOPHAGOGASTRODUODENOSCOPY (EGD);  Surgeon: Erik Baca DO;  Location:  GI    EXCISE MASS HEAD Left  08/18/2016    Procedure: EXCISE MASS HEAD;  Surgeon: Ivan Hernandez MD;  Location: [facility]    HEART CATH, ANGIOPLASTY  04/2012    IR CHEST PORT PLACEMENT > 5 YRS OF AGE  8/9/2024    IR PORT REMOVAL RIGHT  2/17/2025    LAPAROSCOPIC CHOLECYSTECTOMY N/A 03/17/2019    Procedure: LAPAROSCOPIC CHOLECYSTECTOMY;  Surgeon: Janel Paz MD;  Location:  OR    OPTICAL TRACKING SYSTEM CRANIOTOMY, EXCISE TUMOR, COMBINED Right 4/22/2025    Procedure: RIGHT FRONTO-TEMPORAL CRANIOTOMY, USING OPTICAL TRACKING SYSTEM, WITH NEOPLASM EXCISION;  Surgeon: Davie Erickson MD;  Location:  OR    PHACOEMULSIFICATION CLEAR CORNEA WITH STANDARD INTRAOCULAR LENS IMPLANT Left 06/08/2015    Procedure: PHACOEMULSIFICATION CLEAR CORNEA WITH STANDARD INTRAOCULAR LENS IMPLANT;  Surgeon: Nixon Farnsworth MD;  Location:  EC    PHACOEMULSIFICATION CLEAR CORNEA WITH STANDARD INTRAOCULAR LENS IMPLANT Right 06/22/2015    Procedure: PHACOEMULSIFICATION CLEAR CORNEA WITH STANDARD INTRAOCULAR LENS IMPLANT;  Surgeon: Nixon Farnsworth MD;  Location:  EC    SEPTOPLASTY, TURBINOPLASTY, COMBINED Bilateral 08/18/2016    Procedure: COMBINED SEPTOPLASTY, TURBINOPLASTY;  Surgeon: Ivan Hernandez MD;  Location: [facility]    Z NONSPECIFIC PROCEDURE  1997    CABG ([facility])    [facility] NONSPECIFIC PROCEDURE  08/2001    stress echo     FAMILY HISTORY    Family History   Problem Relation Age of Onset    Genitourinary Problems Father         d: age 89; ARF    Hypertension Father     Diabetes Mother         d: age 68, CAD    Heart Disease Mother         MI    Myocardial Infarction Mother     Cardiovascular Brother         d:  age 31; CAD    Heart Disease Brother         age 31, MI    Diabetes Sister         b:  1939; DM2    Diabetes Sister      SOCIAL HISTORY  Social History     Socioeconomic History    Marital status:    Tobacco Use    Smoking status: Former     Current packs/day: 0.00     Average packs/day: 1 pack/day for 6.5 years (6.5 ttl  pk-yrs)     Types: Cigarettes, Cigars, Pipe     Start date:      Quit date: 1964     Years since quittin.8    Smokeless tobacco: Never    Tobacco comments:     10/16/24 Pt states he will an occasional cigar.    Vaping Use    Vaping status: Never Used   Substance and Sexual Activity    Alcohol use: Not Currently     Comment: couple drinks a year    Drug use: No    Sexual activity: Yes     Partners: Female     Birth control/protection: Abstinence   Other Topics Concern    Parent/sibling w/ CABG, MI or angioplasty before 65F 55M? No    Caffeine Concern Yes     Comment: 2 cups coffee a day    Sleep Concern Yes     Comment: sleep apnea, wears cpap off and on    Stress Concern No    Weight Concern No    Special Diet Yes     Comment: diabetic diet     Exercise No     Comment: occ walk the mall    Seat Belt Yes     Social Drivers of Health     Financial Resource Strain: Unknown (2025)    Financial Resource Strain     Within the past 12 months, have you or your family members you live with been unable to get utilities (heat, electricity) when it was really needed?: Patient unable to answer   Food Insecurity: Unknown (2025)    Food Insecurity     Within the past 12 months, did you worry that your food would run out before you got money to buy more?: Patient unable to answer     Within the past 12 months, did the food you bought just not last and you didn t have money to get more?: Patient unable to answer   Transportation Needs: Unknown (2025)    Transportation Needs     Within the past 12 months, has lack of transportation kept you from medical appointments, getting your medicines, non-medical meetings or appointments, work, or from getting things that you need?: Patient unable to answer   Physical Activity: Insufficiently Active (2025)    Exercise Vital Sign     Days of Exercise per Week: 7 days     Minutes of Exercise per Session: 20 min   Stress: No Stress Concern Present (2025)     "[employer] [employer] - Occupational Stress Questionnaire     Feeling of Stress : Not at all   Social Connections: Unknown (2025)    Social Connection and Isolation Panel [NHANES]     Frequency of Social Gatherings with Friends and Family: Three times a week   Interpersonal Safety: Low Risk  (2025)    Interpersonal Safety     Do you feel physically and emotionally safe where you currently live?: Yes     Within the past 12 months, have you been hit, slapped, kicked or otherwise physically hurt by someone?: No     Within the past 12 months, have you been humiliated or emotionally abused in other ways by your partner or ex-partner?: No   Housing Stability: Unknown (2025)    Housing Stability     Do you have housing? : Patient unable to answer     Are you worried about losing your housing?: Patient unable to answer            Height: 180.3 cm (5' 11\")     Temp: 98.5  F (36.9  C)   Weight: 83.4 kg (183 lb 13.8 oz)    Temp src: Oral         BP: 126/53         Estimated body mass index is 25.64 kg/m  as calculated from the following:    Height as of this encounter: 1.803 m (5' 11\").    Weight as of this encounter: 83.4 kg (183 lb 13.8 oz).    Resp: 20   SpO2: 100 %   O2 Device: None (Room air)     Blood Pressure:   BP Readings from Last 3 Encounters:   25 126/53   25 127/53   25 104/49     T24 : Temp (24hrs), Av.8  F (37.1  C), Min:98.5  F (36.9  C), Max:99  F (37.2  C)    General Appearance: No acute distress, normal body habitus  Neuro Exam:  Mental Status Exam: Alert and oriented to city, month, year. Language and speech intact.  Able to name thumb, knuckles, and lapel.able to follow simple and complex commands.  Able to follow sequential commands.  Knew the president as well as the prior president.   Cranial Nerves: Vision/visual fields intact to finger counting. Pupils are equal and reactive to light. Extraocular movements intact.  Slight left facial droop but " otherwise good facial strength.  Motor: Motor tone and bulk are intact in extremities.  Slight pronator drift in the left upper extremity.  Otherwise full strength to confrontational testing in the upper and lower extremities bilaterally.  Reflexes: Symmetrically intact.  Possible upgoing toe on the left.  Sensory: Intact to light touch in the upper and lower extremities, no extinction on double simultaneous stimulation.  Coordination: Coordination with some mild dyskinesia on finger-nose-finger on the left.  Extremities: No clubbing, no cyanosis, no edema         .  LABORATORY RESULTS      Recent Labs   Lab 04/27/25  1637 04/27/25  0531 04/26/25  0510 04/25/25  1432   WBC  --  5.7 7.3 8.2   HGB  --  8.9* 9.2* 9.6*   HCT  --  26.5* 28.1* 29.1*   MCV  --  92 92 92   PLT 83* 77* 86* 80*     Recent Labs   Lab 04/28/25  1119 04/28/25  1015 04/28/25  0802 04/27/25  0607 04/27/25  0531 04/26/25  0655 04/26/25  0510 04/25/25  0433 04/25/25  0329 04/23/25  1829 04/23/25  1731 04/22/25  1431 04/22/25  1349   NA  --   --   --   --  140  --  139  --  138   < > 139   < > 143   POTASSIUM  --   --   --   --  4.0  --  4.1  --  4.1   < > 4.0   < > 3.9   CHLORIDE  --   --   --   --  106  --  104  --  108*   < > 108*   < > 102   CO2  --   --   --   --  26  --  23  --  21*   < > 20*   < > 11*   ANIONGAP  --   --   --   --  8  --  12  --  9   < > 11   < > 30*   * 159* 143*   < > 150*   < > 151*   < > 172*   < > 203*   < > 301*   BUN  --   --   --   --  29.5*  --  26.8*  --  30.7*   < > 27.9*   < > 25.4*   CR  --   --   --   --  0.89  --  0.83  --  1.01   < > 1.10   < > 1.32*   GFRESTIMATED  --   --   --   --  85  --  87  --  74   < > 67   < > 54*   STARR  --   --   --   --  9.4  --  9.3  --  9.1   < > 8.6*   < > 9.8   MAG  --   --   --   --  1.7  --  1.7  --  1.8   < > 2.0   < >  --    PHOS  --   --   --   --  3.3  --  2.9  --  2.6   < > 3.0   < >  --    PROTTOTAL  --   --   --   --   --   --   --   --   --   --  5.0*  --  6.0*  "  ALBUMIN  --   --   --   --   --   --   --   --   --   --  3.3*  --  3.8   BILITOTAL  --   --   --   --   --   --   --   --   --   --  0.7  --  0.6   ALKPHOS  --   --   --   --   --   --   --   --   --   --  51  --  61   AST  --   --   --   --   --   --   --   --   --   --  17  --  30   ALT  --   --   --   --   --   --   --   --   --   --  17  --  30    < > = values in this interval not displayed.     No results for input(s): \"SED\", \"CRP\" in the last 168 hours.  Recent Labs   Lab 04/23/25  1731 04/23/25  0521 04/22/25  1447   LACT 1.4 1.7 6.7*     No results for input(s): \"CHOL\", \"HDL\", \"LDL\", \"TRIG\", \"CHOLHDLRATIO\" in the last 168 hours.  No results for input(s): \"TSH\" in the last 168 hours.  Recent Labs   Lab 04/23/25  2242   COLOR Light Yellow   APPEARANCE Clear   URINEGLC Negative   URINEBILI Negative   URINEKETONE Negative   SG 1.025   UBLD Small*   URINEPH 6.0   PROTEIN Negative   NITRITE Negative   LEUKEST Large*   RBCU 7*   WBCU 79*       IMAGING RESULTS     MRI brain w/wo con 4/23/25  IMPRESSION:  1.  Postop changes related to right pterional craniotomy for dissection of a mass in the anterior right temporal lobe.  2.  4.6 x 2.8 x 3 cm (AP x TR x CC) area of residual enhancing material in the anterior right temporal region, may represent residual tumor.  3.  Slightly diminished edema in the right frontal/temporal junction.  4.  4 mm right-to-left midline shift, previously 6 mm.    EEG 4/25/25  IMPRESSION OF VIDEO EEG DAY # 3: This video electroencephalogram is abnormal due to the presence of moderate encephalopathy and focal slowing in the right temporal region. No electrographic seizures or epileptiform discharges were recorded.     _____________________________________________________________________________  _____________________________________________________________________________          ASSESSMENT     Large right anterior temporal lobe metastatic mass excision with associated hemorrhage and " cerebral edema s/p right frontotemporal craniotomy. Repeat CT on 4/23 did not show significant intracranial changes.   Postoperative cardiopulmonary arrest s/p CPR (epi x 2) with ROSC  Seizures 2/2 above, however patient is at risk in the future for seizures given his temporal lobe excision would recommend continuation of antiseizure medication in the future.        RECOMMENDATIONS   Continue Keppra 1.5 g every 12 hours  PRN IV Ativan 2 mg q 5 minutes if clinical seizure activity noted and lasts longer than 2 minutes  Recommend 6 to 8-week outpatient follow-up with general neurology.    Continue to provide reorientation, reassurance, and stimulation during the day with lights on, blinds open, and the television on alternating with dark, quiet environment at night.  Interruptions during the night should be limited as much as possible.     General Neurology service will sign off as no additional neurologic evaluation or management from our service is required at this time.  Please contact General Neurology service if questions related to neurologic status or follow up needed during this hospitalization.     Selene Chaidez MD   Neurologist, [facility]   April 28, 2025 12:06 PM        A total time of 76  minutes was spent on the care of this patient on the date of the visit, outside of time spent performing any procedures. Please excuse any typographical errors, as this note was generated using a Voice Recognition Software.

## 2025-04-29 ENCOUNTER — APPOINTMENT (OUTPATIENT)
Dept: PHYSICAL THERAPY | Facility: CLINIC | Age: 85
DRG: 025 | End: 2025-04-29
Attending: NEUROLOGICAL SURGERY
Payer: COMMERCIAL

## 2025-04-29 ENCOUNTER — APPOINTMENT (OUTPATIENT)
Dept: OCCUPATIONAL THERAPY | Facility: CLINIC | Age: 85
DRG: 025 | End: 2025-04-29
Attending: NEUROLOGICAL SURGERY
Payer: COMMERCIAL

## 2025-04-29 ENCOUNTER — APPOINTMENT (OUTPATIENT)
Dept: SPEECH THERAPY | Facility: CLINIC | Age: 85
DRG: 025 | End: 2025-04-29
Attending: NEUROLOGICAL SURGERY
Payer: COMMERCIAL

## 2025-04-29 LAB
ANION GAP SERPL CALCULATED.3IONS-SCNC: 11 MMOL/L (ref 7–15)
BACTERIA BLD CULT: NO GROWTH
BACTERIA BLD CULT: NO GROWTH
BUN SERPL-MCNC: 28.4 MG/DL (ref 8–23)
CA-I BLD-MCNC: 5.1 MG/DL (ref 4.4–5.2)
CALCIUM SERPL-MCNC: 9.6 MG/DL (ref 8.8–10.4)
CHLORIDE SERPL-SCNC: 101 MMOL/L (ref 98–107)
CREAT SERPL-MCNC: 0.92 MG/DL (ref 0.67–1.17)
EGFRCR SERPLBLD CKD-EPI 2021: 83 ML/MIN/1.73M2
ERYTHROCYTE [DISTWIDTH] IN BLOOD BY AUTOMATED COUNT: 14.9 % (ref 10–15)
GLUCOSE BLDC GLUCOMTR-MCNC: 136 MG/DL (ref 70–99)
GLUCOSE BLDC GLUCOMTR-MCNC: 193 MG/DL (ref 70–99)
GLUCOSE BLDC GLUCOMTR-MCNC: 233 MG/DL (ref 70–99)
GLUCOSE BLDC GLUCOMTR-MCNC: 357 MG/DL (ref 70–99)
GLUCOSE SERPL-MCNC: 237 MG/DL (ref 70–99)
HCO3 SERPL-SCNC: 25 MMOL/L (ref 22–29)
HCT VFR BLD AUTO: 29.6 % (ref 40–53)
HGB BLD-MCNC: 9.9 G/DL (ref 13.3–17.7)
MAGNESIUM SERPL-MCNC: 1.7 MG/DL (ref 1.7–2.3)
MCH RBC QN AUTO: 30.3 PG (ref 26.5–33)
MCHC RBC AUTO-ENTMCNC: 33.4 G/DL (ref 31.5–36.5)
MCV RBC AUTO: 91 FL (ref 78–100)
PHOSPHATE SERPL-MCNC: 3.3 MG/DL (ref 2.5–4.5)
PLATELET # BLD AUTO: 103 10E3/UL (ref 150–450)
POTASSIUM SERPL-SCNC: 4.3 MMOL/L (ref 3.4–5.3)
RBC # BLD AUTO: 3.27 10E6/UL (ref 4.4–5.9)
SODIUM SERPL-SCNC: 137 MMOL/L (ref 135–145)
WBC # BLD AUTO: 6.9 10E3/UL (ref 4–11)

## 2025-04-29 PROCEDURE — 250N000013 HC RX MED GY IP 250 OP 250 PS 637: Performed by: STUDENT IN AN ORGANIZED HEALTH CARE EDUCATION/TRAINING PROGRAM

## 2025-04-29 PROCEDURE — 97130 THER IVNTJ EA ADDL 15 MIN: CPT | Mod: GO

## 2025-04-29 PROCEDURE — 84100 ASSAY OF PHOSPHORUS: CPT | Performed by: STUDENT IN AN ORGANIZED HEALTH CARE EDUCATION/TRAINING PROGRAM

## 2025-04-29 PROCEDURE — 83735 ASSAY OF MAGNESIUM: CPT | Performed by: STUDENT IN AN ORGANIZED HEALTH CARE EDUCATION/TRAINING PROGRAM

## 2025-04-29 PROCEDURE — 85027 COMPLETE CBC AUTOMATED: CPT | Performed by: STUDENT IN AN ORGANIZED HEALTH CARE EDUCATION/TRAINING PROGRAM

## 2025-04-29 PROCEDURE — 99233 SBSQ HOSP IP/OBS HIGH 50: CPT | Performed by: INTERNAL MEDICINE

## 2025-04-29 PROCEDURE — 97129 THER IVNTJ 1ST 15 MIN: CPT | Mod: GO

## 2025-04-29 PROCEDURE — 92526 ORAL FUNCTION THERAPY: CPT | Mod: GN | Performed by: SPEECH-LANGUAGE PATHOLOGIST

## 2025-04-29 PROCEDURE — 82330 ASSAY OF CALCIUM: CPT | Performed by: STUDENT IN AN ORGANIZED HEALTH CARE EDUCATION/TRAINING PROGRAM

## 2025-04-29 PROCEDURE — 250N000013 HC RX MED GY IP 250 OP 250 PS 637: Performed by: NURSE PRACTITIONER

## 2025-04-29 PROCEDURE — 36415 COLL VENOUS BLD VENIPUNCTURE: CPT | Performed by: STUDENT IN AN ORGANIZED HEALTH CARE EDUCATION/TRAINING PROGRAM

## 2025-04-29 PROCEDURE — 120N000001 HC R&B MED SURG/OB

## 2025-04-29 PROCEDURE — 250N000011 HC RX IP 250 OP 636: Performed by: NURSE PRACTITIONER

## 2025-04-29 PROCEDURE — 97530 THERAPEUTIC ACTIVITIES: CPT | Mod: GP

## 2025-04-29 PROCEDURE — 97116 GAIT TRAINING THERAPY: CPT | Mod: GP

## 2025-04-29 PROCEDURE — 80048 BASIC METABOLIC PNL TOTAL CA: CPT | Performed by: STUDENT IN AN ORGANIZED HEALTH CARE EDUCATION/TRAINING PROGRAM

## 2025-04-29 PROCEDURE — 250N000013 HC RX MED GY IP 250 OP 250 PS 637: Performed by: INTERNAL MEDICINE

## 2025-04-29 RX ORDER — LEVETIRACETAM 750 MG/1
1500 TABLET ORAL EVERY 12 HOURS
Status: DISCONTINUED | OUTPATIENT
Start: 2025-04-29 | End: 2025-05-04 | Stop reason: HOSPADM

## 2025-04-29 RX ADMIN — INSULIN GLARGINE 40 UNITS: 100 INJECTION, SOLUTION SUBCUTANEOUS at 21:31

## 2025-04-29 RX ADMIN — DEXAMETHASONE SODIUM PHOSPHATE 4 MG: 4 INJECTION, SOLUTION INTRAMUSCULAR; INTRAVENOUS at 08:30

## 2025-04-29 RX ADMIN — ATORVASTATIN CALCIUM 40 MG: 40 TABLET, FILM COATED ORAL at 08:30

## 2025-04-29 RX ADMIN — LEVOTHYROXINE SODIUM 125 MCG: 125 TABLET ORAL at 08:30

## 2025-04-29 RX ADMIN — INSULIN ASPART 4 UNITS: 100 INJECTION, SOLUTION INTRAVENOUS; SUBCUTANEOUS at 13:41

## 2025-04-29 RX ADMIN — LEVETIRACETAM 1500 MG: 750 TABLET, FILM COATED ORAL at 17:48

## 2025-04-29 RX ADMIN — SENNOSIDES AND DOCUSATE SODIUM 1 TABLET: 50; 8.6 TABLET ORAL at 21:30

## 2025-04-29 RX ADMIN — ISOSORBIDE MONONITRATE 30 MG: 30 TABLET, EXTENDED RELEASE ORAL at 08:29

## 2025-04-29 RX ADMIN — INSULIN GLARGINE 40 UNITS: 100 INJECTION, SOLUTION SUBCUTANEOUS at 08:32

## 2025-04-29 RX ADMIN — LEVETIRACETAM 1500 MG: 100 SOLUTION ORAL at 03:54

## 2025-04-29 RX ADMIN — INSULIN ASPART 9 UNITS: 100 INJECTION, SOLUTION INTRAVENOUS; SUBCUTANEOUS at 17:47

## 2025-04-29 ASSESSMENT — ACTIVITIES OF DAILY LIVING (ADL)
ADLS_ACUITY_SCORE: 56
ADLS_ACUITY_SCORE: 50
ADLS_ACUITY_SCORE: 51
ADLS_ACUITY_SCORE: 56
ADLS_ACUITY_SCORE: 50
ADLS_ACUITY_SCORE: 50
ADLS_ACUITY_SCORE: 51
ADLS_ACUITY_SCORE: 55
ADLS_ACUITY_SCORE: 51
ADLS_ACUITY_SCORE: 56
ADLS_ACUITY_SCORE: 50
ADLS_ACUITY_SCORE: 56
ADLS_ACUITY_SCORE: 51
ADLS_ACUITY_SCORE: 51
ADLS_ACUITY_SCORE: 55
ADLS_ACUITY_SCORE: 50
ADLS_ACUITY_SCORE: 51
ADLS_ACUITY_SCORE: 50
ADLS_ACUITY_SCORE: 50
ADLS_ACUITY_SCORE: 56
ADLS_ACUITY_SCORE: 51

## 2025-04-29 NOTE — PROGRESS NOTES
"[facility] Provider Note  4/28/2025 2120: I was paged to come evaluate Mr. Perea for an witnessed, assisted fall. Mr. Perea is post-op day 6 from a craini. He had another fall earlier in the day (see RRT note authored by Alma Black CNP for details).   This evening shortly after 2100, Mr Perea had gotten out of bed to use the bathroom. He was assisted by a nursing assistant, had a gait belt on and was using a walker. Apparently, he was on his way back to the bed, when his legs became \"weak\" and began to give way. He fell forward onto the mattress, but the fall was slowed. He was then lowered into a kneeling position. The nursing assistant notified the RN, who paged me to come evaluate him.  Upon my arrival, Mr. Perea had already gotten himself back in the bed. He denies hitting his head and stated \"I did not fall, I was eased down.\" He denies dizziness, lightheadedness or syncope prior to the event. He also denies any pain or injury. Instead, he reports that his legs have been more weak since he had radiation therapy last Fall.   On exam, there is no evidence of new traumatic injury. Neurologically he appears to be at his current baseline. No knee pain, headache, neck ache or back pain.   I advised nursing staff to use the bedside commode overnight rather than allowing him to ambulate to the bathroom. Fall precautions are in place, bed is in low position, side rails are padded and I confirmed that the bed alarm is on. A gait belt and walker is to be used with ambulation and transfers. A physical therapy consult was placed earlier in the day. No further imaging is indicated at this time.  "

## 2025-04-29 NOTE — PROGRESS NOTES
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 Admitted on 4/16/2025.      Brain mass with hemorrhage, vasogenic edema   Encephalopathy due to brain mass/brain edema  High grade neuroendocrine tumor, large cell type, left parotid gland s/p chemoradiation   *Presents with increased lethargy, somnolence, word-finding and memory difficulty, generalized weakness in legs   *CT head with 5.3 x 4 x 3.8 cm right temporal lobe mass with associated hemorrhagic aspect, significant surrounding vasogenic edema, right to left midline shift 6.6 mm  *MRI brain with 5.3 x 3.8 x 4.3 cm right temporal lobe mass with extensive surrounding vasogenic edema, hemorrhagic signal within the mass, right to left midline shift 4 to 5 mm  *Hx of neuroendocrine tumor as above, follows with [facility] (Dr. Butler), felt to be in remission  Neurosurgery consult requested.  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  Please see cancer treatment history outlined by Dr. Bravo in his note from 4/23.  Essentially, he notes that after a patient received a course of definitive chemoradiation therapy for left parotid small cell carcinoma a PET CT on 12/23/2024 showed complete metabolic response with no evidence of residual disease.  Dr. Bravo continues, \"Should he experience clinical improvement, I would recommend post-operative radiotherapy to the surgical cavity and residual disease.......We will follow his clinical course and arrange for outpatient follow-up, treatment planning MRI, and CT simulation in 2-3 weeks pending his status " "with initiation of treatment approximately 1 week later.\"  Neurochecks, steroids, wound cares, pain control per neurosurgery  AEDs per general neurology, now on Keppra 1500 mg twice daily  Goal SBP <150, PRN IV labetalol, hydralazine ordered  He has high risk of hospital-acquired delirium due to structural brain disease, advanced age, CNS acting medications, including steroids.  Added as needed melatonin to promote restful sleep.  As needed quetiapine also available for agitated delirium.    Moderate oral and pharyngeal dysphagia  Now on IDDSI level 5 (minced/moist) diet and thin liquids  Continue swallowing treatments with SLP  4/28: Discussed with nutrition.  Patient is eating well, ate 100% of his tray and is asking for more food.  Taper and discontinue tube feedings, leave enteral tube in place for now.  4/29: Patient is once again eating 100% of meals.  Remove feeding tube.    Falls  RRT was called x 2 on 4/28 for falls, not resulting in significant injury.  The first episode was unwitnessed, patient reported that his left knee gave way and he \"sat down.\"  The second episode was assisted, patient was lowered back onto his bed.  Fall precautions were added  Continue PT, OT  Needs to be up with assist only, including use of gait belt and walker  Also has bedside attendant    Coronary artery disease status post CABG  LVH  Mild troponin elevation  Troponin 42->45. Denies chest pain. EKG reviewed by cardiology initially due to ST elevations in setting of repolarization abnormality, not felt to be ischemic. Likely secondary to brain mass/bleed.   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 Creatinine 1.22 on admission.   Currently around baseline  Avoid nephrotoxins  Monitor BMP      Diabetes mellitus, type 2, on long term insulin   HgbA1c 6.9% " 2/10/25. Prior to admission on glargine 40 units BID, aspart 3 units per carb unit.  4/28:Remains on insulin gtt, 6 units/hr  We discussed diabetes management.  He says his diabetes was diagnosed 35 years ago, he checks his own blood sugars and recognizes symptoms of hyper and hypoglycemia.  He was able to describe what he would do if his blood sugar was low.  He recalls his prior to admission dose of basal insulin.  4/28:  Discontinue insulin drip, resume PTA basal insulin, Lantus 40 units twice daily and PTA prandial insulin at slightly lower dose, 1 unit per 15 g of carbohydrate with high scale corrective dose insulin  Hypoglycemia protocol  4/29: Morning blood sugar reading is a goal     Hyperlipidemia  Continue PTA atorvastatin, fenofibrate      Hypothyroidism  Continue PTA levothyroxine           Diet: Combination Diet Minced and Moist Diet (level 5); Thin Liquids (level 0) (No straws, supervision due to impulsivenss, slow rate, small bites/sips, alternate liquids/solids. Hold diet if increased coughing or changes in his respiratory status.)  Room Service  Snacks/Supplements Adult: Other; offer supplement with meals; With Meals  Adult Formula Drip Feeding: Continuous Novasource Renal; Nasogastric tube; Goal Rate: 50 ml/hr x 22 hours (hold x2 hours for levothyroxine dose); mL/hr; Finish out current EN bag - once empty, hold further feedings.  Will reassess plan 4/28.    DVT Prophylaxis: Pneumatic Compression Devices  Duran Catheter: Not present  Lines: None     Cardiac Monitoring: ACTIVE order. Indication: Tachyarrhythmias, acute (48 hours)  Code Status: Full Code      Clinically Significant Risk Factors               # Hypoalbuminemia: Lowest albumin = 3.3 g/dL at 4/23/2025  5:31 PM, will monitor as appropriate   # Thrombocytopenia: Lowest platelets = 83 in last 2 days, will monitor for bleeding   # Hypertension: Noted on problem list           # DMII: A1C = 7.9 % (Ref range: <5.7 %) within past 6 months   #  "Overweight: Estimated body mass index is 25.64 kg/m  as calculated from the following:    Height as of this encounter: 1.803 m (5' 11\").    Weight as of this encounter: 83.4 kg (183 lb 13.8 oz).        # Financial/Environmental Concerns:           Social Drivers of Health           Disposition Plan     Medically Ready for Discharge: Anticipated in 2-4 Days       Hilaria Ly MD  Hospitalist Service  [facility]  Securely message with [employer] (more info)  Text page via [employer] Paging/Directory   ______________________________________________________________________    Interval History   \"Mm-hmm.\"  Dayday was napping in the early afternoon.  Discussed with bedside RN, patient is sometimes impulsive, now has bedside attendant.  He did not have a restful night of sleep.  He did eat 100% of his breakfast.  He has no new respiratory or GI complaints.    Physical Exam   Vital Signs: Temp: 98.8  F (37.1  C) Temp src: Oral BP: 132/62 Pulse: 57   Resp: 18 SpO2: 98 % O2 Device: None (Room air)    Weight: 183 lbs 13.82 oz    Constitutional: Dozing, wakes briefly, prefers to sleep.  HEENT:  long incision right frontotemporal scalp without redness or drainage  Respiratory: Clear to auscultation bilaterally, no crackles or wheezing  Cardiovascular: Irregularly irregular rhythm with murmur  GI: Normal bowel sounds, soft, non-distended, non-tender  Skin/Integumen: No rash on exposed skin.  No lower extremity edema  Other: Cannot assess mood due to drowsiness    Medical Decision Making       51 MINUTES SPENT BY ME on the date of service doing chart review, history, exam, documentation & further activities per the note.   Including discussion with patient and bedside RN, also neurosurgery    Data     I have personally reviewed the following data over the past 24 hrs:    8.3  \   10.3 (L)   / 98 (L)     138 101 31.2 (H) /  136 (H)   3.9 26 0.83 \       Imaging results reviewed over the past 24 hrs:   No results " found for this or any previous visit (from the past 24 hours).

## 2025-04-29 NOTE — PROGRESS NOTES
Neurosurgery progress note     Platelet count today 103k.  Yesterday was 93K.  Patient states he is feeling well.  Denies headache.  Sitter in the room states he has not been picking at his incision.     Exam     Alert and oriented no acute distress  Moving all extremities  Finger-nose slow and accurate  Negative pronator drift  Extraocular movements intact  Pupils equal and reactive     Cranial incision clean dry and intact     Assessment     Postop day 7 status post right frontotemporal craniotomy, performed with Dr. Erickson, pathology revealed metastatic small cell carcinoma.        Plan     If drop <75k, should transfuse for goal platelets >75k.  CBC today pending     -7th floor q4H neuro checks    -Pain control  -Decadron 2 weeks to off orders in   -Seizure meds per neurology, currently on Keppra 1500 mg twice daily  -Hospitalist consult for medical comanagement now that patient is out of ICU, defer evaluation of a increasing white count to hospitalist and infectious disease.  -Speech therapy consult  -Wound cares. May leave open to air, cover incision with light gauze if patient is picking at it.  -Rad Onc following  -May resume apixaban for A-fib on POD 14.

## 2025-04-29 NOTE — PROGRESS NOTES
Reason for Admission: POD #7 crani      Cognitive/Mentation: A/Ox 4  Neuros/CMS: Intact ex slight left facial droop.  VS: BP<140.   Tele: A-fib.  /GI: Continent- using bedside commode. Last BM 4/28.   Pulmonary: LS clear.  Pain: denied.      Drains/Lines: SL  Skin: Incision is TYRELL  Activity: Assist x 2 with GB and cane.  Diet: Minced and moist with thin. Takes pills whole with water.      Therapies recs: pending  Discharge: pending     Aggression Stoplight Tool: green     End of shift summary: [place] pulled this afternoon by RN.

## 2025-04-30 ENCOUNTER — APPOINTMENT (OUTPATIENT)
Dept: GENERAL RADIOLOGY | Facility: CLINIC | Age: 85
DRG: 025 | End: 2025-04-30
Attending: PHYSICIAN ASSISTANT
Payer: COMMERCIAL

## 2025-04-30 ENCOUNTER — APPOINTMENT (OUTPATIENT)
Dept: OCCUPATIONAL THERAPY | Facility: CLINIC | Age: 85
DRG: 025 | End: 2025-04-30
Attending: NEUROLOGICAL SURGERY
Payer: COMMERCIAL

## 2025-04-30 ENCOUNTER — APPOINTMENT (OUTPATIENT)
Dept: PHYSICAL THERAPY | Facility: CLINIC | Age: 85
DRG: 025 | End: 2025-04-30
Attending: NEUROLOGICAL SURGERY
Payer: COMMERCIAL

## 2025-04-30 ENCOUNTER — PATIENT OUTREACH (OUTPATIENT)
Dept: CARE COORDINATION | Facility: CLINIC | Age: 85
End: 2025-04-30
Payer: COMMERCIAL

## 2025-04-30 ENCOUNTER — APPOINTMENT (OUTPATIENT)
Dept: SPEECH THERAPY | Facility: CLINIC | Age: 85
DRG: 025 | End: 2025-04-30
Attending: NEUROLOGICAL SURGERY
Payer: COMMERCIAL

## 2025-04-30 LAB
ANION GAP SERPL CALCULATED.3IONS-SCNC: 10 MMOL/L (ref 7–15)
BUN SERPL-MCNC: 26.5 MG/DL (ref 8–23)
CA-I BLD-MCNC: 5 MG/DL (ref 4.4–5.2)
CALCIUM SERPL-MCNC: 9.5 MG/DL (ref 8.8–10.4)
CHLORIDE SERPL-SCNC: 101 MMOL/L (ref 98–107)
CREAT SERPL-MCNC: 0.89 MG/DL (ref 0.67–1.17)
EGFRCR SERPLBLD CKD-EPI 2021: 85 ML/MIN/1.73M2
ERYTHROCYTE [DISTWIDTH] IN BLOOD BY AUTOMATED COUNT: 15.1 % (ref 10–15)
GLUCOSE BLDC GLUCOMTR-MCNC: 106 MG/DL (ref 70–99)
GLUCOSE BLDC GLUCOMTR-MCNC: 129 MG/DL (ref 70–99)
GLUCOSE BLDC GLUCOMTR-MCNC: 131 MG/DL (ref 70–99)
GLUCOSE BLDC GLUCOMTR-MCNC: 160 MG/DL (ref 70–99)
GLUCOSE BLDC GLUCOMTR-MCNC: 245 MG/DL (ref 70–99)
GLUCOSE SERPL-MCNC: 109 MG/DL (ref 70–99)
HCO3 SERPL-SCNC: 27 MMOL/L (ref 22–29)
HCT VFR BLD AUTO: 28.3 % (ref 40–53)
HGB BLD-MCNC: 9.5 G/DL (ref 13.3–17.7)
MAGNESIUM SERPL-MCNC: 1.8 MG/DL (ref 1.7–2.3)
MCH RBC QN AUTO: 30.1 PG (ref 26.5–33)
MCHC RBC AUTO-ENTMCNC: 33.6 G/DL (ref 31.5–36.5)
MCV RBC AUTO: 90 FL (ref 78–100)
PHOSPHATE SERPL-MCNC: 3.1 MG/DL (ref 2.5–4.5)
PLATELET # BLD AUTO: 97 10E3/UL (ref 150–450)
POTASSIUM SERPL-SCNC: 4.1 MMOL/L (ref 3.4–5.3)
RBC # BLD AUTO: 3.16 10E6/UL (ref 4.4–5.9)
SODIUM SERPL-SCNC: 138 MMOL/L (ref 135–145)
WBC # BLD AUTO: 5.5 10E3/UL (ref 4–11)

## 2025-04-30 PROCEDURE — 250N000013 HC RX MED GY IP 250 OP 250 PS 637: Performed by: NURSE PRACTITIONER

## 2025-04-30 PROCEDURE — 72100 X-RAY EXAM L-S SPINE 2/3 VWS: CPT

## 2025-04-30 PROCEDURE — 250N000011 HC RX IP 250 OP 636: Performed by: NURSE PRACTITIONER

## 2025-04-30 PROCEDURE — 97535 SELF CARE MNGMENT TRAINING: CPT | Mod: GO

## 2025-04-30 PROCEDURE — 84100 ASSAY OF PHOSPHORUS: CPT | Performed by: STUDENT IN AN ORGANIZED HEALTH CARE EDUCATION/TRAINING PROGRAM

## 2025-04-30 PROCEDURE — 36415 COLL VENOUS BLD VENIPUNCTURE: CPT | Performed by: STUDENT IN AN ORGANIZED HEALTH CARE EDUCATION/TRAINING PROGRAM

## 2025-04-30 PROCEDURE — 82330 ASSAY OF CALCIUM: CPT | Performed by: STUDENT IN AN ORGANIZED HEALTH CARE EDUCATION/TRAINING PROGRAM

## 2025-04-30 PROCEDURE — 92526 ORAL FUNCTION THERAPY: CPT | Mod: GN | Performed by: SPEECH-LANGUAGE PATHOLOGIST

## 2025-04-30 PROCEDURE — 120N000001 HC R&B MED SURG/OB

## 2025-04-30 PROCEDURE — 250N000013 HC RX MED GY IP 250 OP 250 PS 637: Performed by: INTERNAL MEDICINE

## 2025-04-30 PROCEDURE — 99233 SBSQ HOSP IP/OBS HIGH 50: CPT | Performed by: INTERNAL MEDICINE

## 2025-04-30 PROCEDURE — 85027 COMPLETE CBC AUTOMATED: CPT | Performed by: STUDENT IN AN ORGANIZED HEALTH CARE EDUCATION/TRAINING PROGRAM

## 2025-04-30 PROCEDURE — 97116 GAIT TRAINING THERAPY: CPT | Mod: GP

## 2025-04-30 PROCEDURE — 97530 THERAPEUTIC ACTIVITIES: CPT | Mod: GP

## 2025-04-30 PROCEDURE — 83735 ASSAY OF MAGNESIUM: CPT | Performed by: STUDENT IN AN ORGANIZED HEALTH CARE EDUCATION/TRAINING PROGRAM

## 2025-04-30 PROCEDURE — 82947 ASSAY GLUCOSE BLOOD QUANT: CPT | Performed by: STUDENT IN AN ORGANIZED HEALTH CARE EDUCATION/TRAINING PROGRAM

## 2025-04-30 PROCEDURE — 250N000013 HC RX MED GY IP 250 OP 250 PS 637: Performed by: STUDENT IN AN ORGANIZED HEALTH CARE EDUCATION/TRAINING PROGRAM

## 2025-04-30 RX ADMIN — INSULIN ASPART 1 UNITS: 100 INJECTION, SOLUTION INTRAVENOUS; SUBCUTANEOUS at 18:13

## 2025-04-30 RX ADMIN — ISOSORBIDE MONONITRATE 30 MG: 30 TABLET, EXTENDED RELEASE ORAL at 08:19

## 2025-04-30 RX ADMIN — POLYETHYLENE GLYCOL 3350 17 G: 17 POWDER, FOR SOLUTION ORAL at 08:19

## 2025-04-30 RX ADMIN — INSULIN GLARGINE 40 UNITS: 100 INJECTION, SOLUTION SUBCUTANEOUS at 08:19

## 2025-04-30 RX ADMIN — LEVETIRACETAM 1500 MG: 750 TABLET, FILM COATED ORAL at 08:19

## 2025-04-30 RX ADMIN — LEVOTHYROXINE SODIUM 125 MCG: 125 TABLET ORAL at 08:19

## 2025-04-30 RX ADMIN — LEVETIRACETAM 1500 MG: 750 TABLET, FILM COATED ORAL at 20:17

## 2025-04-30 RX ADMIN — INSULIN ASPART 5 UNITS: 100 INJECTION, SOLUTION INTRAVENOUS; SUBCUTANEOUS at 13:12

## 2025-04-30 RX ADMIN — INSULIN GLARGINE 40 UNITS: 100 INJECTION, SOLUTION SUBCUTANEOUS at 21:58

## 2025-04-30 RX ADMIN — ATORVASTATIN CALCIUM 40 MG: 40 TABLET, FILM COATED ORAL at 08:19

## 2025-04-30 RX ADMIN — DEXAMETHASONE SODIUM PHOSPHATE 4 MG: 4 INJECTION, SOLUTION INTRAMUSCULAR; INTRAVENOUS at 08:19

## 2025-04-30 RX ADMIN — SENNOSIDES AND DOCUSATE SODIUM 1 TABLET: 50; 8.6 TABLET ORAL at 20:17

## 2025-04-30 RX ADMIN — SENNOSIDES AND DOCUSATE SODIUM 1 TABLET: 50; 8.6 TABLET ORAL at 08:19

## 2025-04-30 ASSESSMENT — ACTIVITIES OF DAILY LIVING (ADL)
ADLS_ACUITY_SCORE: 48
ADLS_ACUITY_SCORE: 56
ADLS_ACUITY_SCORE: 48
ADLS_ACUITY_SCORE: 56
ADLS_ACUITY_SCORE: 56
ADLS_ACUITY_SCORE: 48
ADLS_ACUITY_SCORE: 56
ADLS_ACUITY_SCORE: 56
ADLS_ACUITY_SCORE: 48
ADLS_ACUITY_SCORE: 48
ADLS_ACUITY_SCORE: 56
ADLS_ACUITY_SCORE: 48
ADLS_ACUITY_SCORE: 56
ADLS_ACUITY_SCORE: 48
ADLS_ACUITY_SCORE: 48
ADLS_ACUITY_SCORE: 56
ADLS_ACUITY_SCORE: 48
ADLS_ACUITY_SCORE: 56
ADLS_ACUITY_SCORE: 55

## 2025-04-30 NOTE — PLAN OF CARE
Reason for Admission: POD 9 craniotomy    Cognitive/Mentation: A/Ox 4  Neuros/CMS: Intact ex slight L droop  VS: VSS on RA.   Tele: afib CVR.  /GI: Continent. Last BM 4/30/25.   Pulmonary: LS clear.  Pain: lower back pain with movement, lumbar xray done.    Drains/Lines: PIV SL  Skin: scattered bruising, scalp incision TYRELL approximated.  Activity: Assist x 1 with walker/GB.  Diet: easy to chew with thin liquids. Takes pills whole with water.     Therapies recs: ARU  Discharge: pending    Aggression Stoplight Tool: green

## 2025-04-30 NOTE — PLAN OF CARE
Pt here with POD 8 R Crani. A&Ox4. Neuros L droop. VSS<140. Tele Afib CVR. Minced moist diet. Thin liquids. Takes pills whole. Up with A1GB/Cane. Denies pain. Pt scoring green on the Aggression Stop Light Tool. Crani site WDL/TYRELL. Discharge pending.

## 2025-04-30 NOTE — PROGRESS NOTES
Radiation Oncology Progress Note  S: Patient feeling well today with some soreness of the ribs/sternum but otherwise comfortable. No headaches, scalp incisional pain, nausea, weakness, or changes in strength or sensation.    O: Sitting comfortably in bed, moving all extremities spontaneously. Scalp incision is clean, dry, and intact.    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    Given his limited disease extent and improved clinical status, I discussed recommendations for post-operative stereotactic radiation therapy to the surgical cavity and residual disease to 3000 cGy in 5 fractions. I reviewed the logistics and toxicities of treatment, which will need to be completed here at [facility] as [facility] does not perform brain stereotactic treatments.    We will plan for brain MRI and CT simulation in 2-3 weeks pending his discharge from the hospital and rehab with initiation of treatment approximately 1 week later.       Claudio Bravo MD  [facility] - [facility]

## 2025-04-30 NOTE — PROGRESS NOTES
Care Management Follow Up    Length of Stay (days): 8    Expected Discharge Date: 05/01/2025     Concerns to be Addressed: discharge planning       Patient plan of care discussed at interdisciplinary rounds: Yes    Anticipated Discharge Disposition: [facility]  Anticipated Discharge Services: therapies    Referrals Placed by CM/SW:  post acute facilities   Private pay costs discussed: Not applicable    Additional Information:  Patient not medically ready to discharge. [facility] is following for potential placement.     Anabell Rowan, MARKEL, LGSW   Social Work   [facility]

## 2025-04-30 NOTE — PROGRESS NOTES
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 Admitted on 4/16/2025.      Brain mass with hemorrhage, vasogenic edema   Encephalopathy due to brain mass/brain edema  High grade neuroendocrine tumor, large cell type, left parotid gland s/p chemoradiation   *Presents with increased lethargy, somnolence, word-finding and memory difficulty, generalized weakness in legs   *CT head with 5.3 x 4 x 3.8 cm right temporal lobe mass with associated hemorrhagic aspect, significant surrounding vasogenic edema, right to left midline shift 6.6 mm  *MRI brain with 5.3 x 3.8 x 4.3 cm right temporal lobe mass with extensive surrounding vasogenic edema, hemorrhagic signal within the mass, right to left midline shift 4 to 5 mm  *Hx of neuroendocrine tumor as above, follows with [facility] (Dr. Butler), felt to be in remission  Neurosurgery consult requested.  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  Please see cancer treatment history outlined by Dr. Bravo in his note from 4/23.  Essentially, he notes that after a patient received a course of definitive chemoradiation therapy for left parotid small cell carcinoma a PET CT on 12/23/2024 showed complete metabolic response with no evidence of residual disease.  Per Dr. Bravo on 4/30, \"Given his limited disease extent and improved clinical status, I discussed recommendations for post-operative stereotactic radiation therapy to the surgical cavity and residual disease to 3000 cGy in 5 fractions.   We will plan for brain MRI and CT simulation in 2-3 weeks pending " "his discharge from the hospital and rehab with initiation of treatment approximately 1 week later.\"  I appreciate his followup.  Neurochecks, steroids, wound cares, pain control per neurosurgery  AEDs per general neurology, now on Keppra 1500 mg twice daily  Goal SBP <150, PRN IV labetalol, hydralazine ordered    Moderate oral and pharyngeal dysphagia  Now on IDDSI level 5 (minced/moist) diet and thin liquids  Continue swallowing treatments with SLP  4/28: Discussed with nutrition.  Patient is eating well, ate 100% of his tray and is asking for more food.  Taper and discontinue tube feedings, leave enteral tube in place for now.  4/29: Patient is once again eating 100% of meals.  Remove feeding tube.    Falls  Back pain  RRT was called x 2 on 4/28 for falls, not resulting in significant injury.  The first episode was unwitnessed, patient reported that his left knee gave way and he \"sat down.\"  The second episode was assisted, patient was lowered back onto his bed.  Fall precautions were added  Continue PT, OT  He has high risk of hospital-acquired delirium due to structural brain disease, advanced age, CNS acting medications, including steroids.  Added as needed melatonin to promote restful sleep.  As needed quetiapine also available for agitated delirium.  Needs to be up with assist only, including use of gait belt and walker  Also had bedside attendant, discontinued 4/29  PT, patient complained of back pain.  Neurosurgery ordered x-ray of lumbar spine which shows osteopenia, is otherwise unremarkable.    Coronary artery disease status post CABG  LVH  Mild troponin elevation  Postoperative hypotension, CODE BLUE activated  Troponin 42->45. Denies chest pain. EKG reviewed by cardiology initially due to ST elevations in setting of repolarization abnormality, not felt to be ischemic. Likely secondary to brain mass/bleed.   See postoperative note from Dr. Erickson, patient had \"hypotensive episode following extubation in " "the PACU to the point that he required activation of CODE BLUE.  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 Creatinine 1.22 on admission.   Currently around baseline  Avoid nephrotoxins  Monitor BMP      Diabetes mellitus, type 2, on long term insulin   HgbA1c 6.9% 2/10/25. Prior to admission on glargine 40 units BID, aspart 3 units per carb unit.  4/28:Remains on insulin gtt, 6 units/hr  We discussed diabetes management.  He says his diabetes was diagnosed 35 years ago, he checks his own blood sugars and recognizes symptoms of hyper and hypoglycemia.  He was able to describe what he would do if his blood sugar was low.  He recalls his prior to admission dose of basal insulin.  4/28:  Discontinue insulin drip, resume PTA basal insulin, Lantus 40 units twice daily and PTA prandial insulin at slightly lower dose, 1 unit per 15 g of carbohydrate with high scale corrective dose insulin  Hypoglycemia protocol  4/30: Most blood sugar readings are at goal     Hyperlipidemia  Continue PTA atorvastatin, fenofibrate      Hypothyroidism  Continue PTA levothyroxine           Diet: Room Service  Snacks/Supplements Adult: Other; offer supplement with meals; With Meals  Combination Diet Easy to Chew (level 7); Thin Liquids (level 0) (No straws, supervision due to impulsivenss, slow rate, small bites/sips, alternate liquids/solids. Hold diet if increased coughing or changes in his respiratory status.)    DVT Prophylaxis: Pneumatic Compression Devices  Duran Catheter: Not present  Lines: None     Cardiac Monitoring: ACTIVE order. Indication: Tachyarrhythmias, acute (48 hours)  Code Status: Full Code      Clinically Significant Risk Factors               # Hypoalbuminemia: Lowest albumin = 3.3 g/dL at 4/23/2025  5:31 PM, will " "monitor as appropriate   # Thrombocytopenia: Lowest platelets = 97 in last 2 days, will monitor for bleeding   # Hypertension: Noted on problem list           # DMII: A1C = 7.9 % (Ref range: <5.7 %) within past 6 months   # Overweight: Estimated body mass index is 25.64 kg/m  as calculated from the following:    Height as of this encounter: 1.803 m (5' 11\").    Weight as of this encounter: 83.4 kg (183 lb 13.8 oz).        # Financial/Environmental Concerns:           Social Drivers of Health           Disposition Plan     Medically Ready for Discharge: Anticipated in 2-4 Days       Hilaria Ly MD  Hospitalist Service  [facility]  Securely message with [employer] (more info)  Text page via [employer] Paging/Directory   ______________________________________________________________________    Interval History   \"Do I still have to go to that rehab?\"  Patient says he had a restful night of sleep, he is feeling good.  He says he has a very important family event (grandsons wedding) on 5/20 and wants to be discharged from rehab in time to attend.  He denies headache, no respiratory or GI complaints.  He has a sore spot on the left side of his chest where they performed CPR.    Physical Exam   Vital Signs: Temp: 98.6  F (37  C) Temp src: Oral BP: 110/56 Pulse: 55   Resp: 18 SpO2: 98 % O2 Device: None (Room air)    Weight: 183 lbs 13.82 oz    Constitutional: Awake, alert, appropriate  HEENT:  long incision right frontotemporal scalp without redness or drainage  Respiratory: Clear to auscultation bilaterally, no crackles or wheezing  Cardiovascular: Irregularly irregular rhythm with murmur  GI: Normal bowel sounds, soft, non-distended, non-tender  Skin/Integumen: No rash on exposed skin.  No lower extremity edema  Other: Mood is pleasant and outgoing.  Knows that this is [facility], April, 2025.  He quite easily did serial sevens going back to 66.    Medical Decision Making       51 " MINUTES SPENT BY ME on the date of service doing chart review, history, exam, documentation & further activities per the note.   Including discussion with patient, bedside RN, PT, Radiation Oncology, Dr. Bravo    Data     I have personally reviewed the following data over the past 24 hrs:    5.5  \   9.5 (L)   / 97 (L)     138 101 26.5 (H) /  109 (H); 106 (H)   4.1 27 0.89 \       Imaging results reviewed over the past 24 hrs:   No results found for this or any previous visit (from the past 24 hours).

## 2025-04-30 NOTE — PROGRESS NOTES
Reason for Admission: POD #7 crani      Cognitive/Mentation: A/Ox 4  Neuros/CMS: Intact ex slight left facial droop.  VS: BP<140.   /GI: Continent- using bedside commode. Last BM today.   Pulmonary: LS clear.  Pain: denied.   Drains/Lines: SL  Skin: Incision site TYRELL, Scattered bruising  Activity: Assist x 2 with GB and cane.  Diet: Easy chew with thin. Takes pills whole with water.      Therapies recs: pending  Discharge: pending     Aggression Stoplight Tool: green

## 2025-05-01 ENCOUNTER — APPOINTMENT (OUTPATIENT)
Dept: PHYSICAL THERAPY | Facility: CLINIC | Age: 85
DRG: 025 | End: 2025-05-01
Attending: NEUROLOGICAL SURGERY
Payer: COMMERCIAL

## 2025-05-01 ENCOUNTER — APPOINTMENT (OUTPATIENT)
Dept: SPEECH THERAPY | Facility: CLINIC | Age: 85
DRG: 025 | End: 2025-05-01
Attending: NEUROLOGICAL SURGERY
Payer: COMMERCIAL

## 2025-05-01 VITALS
OXYGEN SATURATION: 95 % | TEMPERATURE: 98.2 F | SYSTOLIC BLOOD PRESSURE: 135 MMHG | WEIGHT: 183.86 LBS | DIASTOLIC BLOOD PRESSURE: 61 MMHG | BODY MASS INDEX: 25.74 KG/M2 | HEIGHT: 71 IN | HEART RATE: 58 BPM | RESPIRATION RATE: 18 BRPM

## 2025-05-01 LAB
ANION GAP SERPL CALCULATED.3IONS-SCNC: 8 MMOL/L (ref 7–15)
BUN SERPL-MCNC: 19.9 MG/DL (ref 8–23)
CA-I BLD-MCNC: 4.8 MG/DL (ref 4.4–5.2)
CALCIUM SERPL-MCNC: 9.3 MG/DL (ref 8.8–10.4)
CHLORIDE SERPL-SCNC: 105 MMOL/L (ref 98–107)
CREAT SERPL-MCNC: 0.9 MG/DL (ref 0.67–1.17)
EGFRCR SERPLBLD CKD-EPI 2021: 85 ML/MIN/1.73M2
ERYTHROCYTE [DISTWIDTH] IN BLOOD BY AUTOMATED COUNT: 14.9 % (ref 10–15)
GLUCOSE BLDC GLUCOMTR-MCNC: 135 MG/DL (ref 70–99)
GLUCOSE BLDC GLUCOMTR-MCNC: 135 MG/DL (ref 70–99)
GLUCOSE BLDC GLUCOMTR-MCNC: 146 MG/DL (ref 70–99)
GLUCOSE BLDC GLUCOMTR-MCNC: 163 MG/DL (ref 70–99)
GLUCOSE BLDC GLUCOMTR-MCNC: 184 MG/DL (ref 70–99)
GLUCOSE BLDC GLUCOMTR-MCNC: 57 MG/DL (ref 70–99)
GLUCOSE BLDC GLUCOMTR-MCNC: 71 MG/DL (ref 70–99)
GLUCOSE BLDC GLUCOMTR-MCNC: 80 MG/DL (ref 70–99)
GLUCOSE BLDC GLUCOMTR-MCNC: 90 MG/DL (ref 70–99)
GLUCOSE SERPL-MCNC: 53 MG/DL (ref 70–99)
HCO3 SERPL-SCNC: 27 MMOL/L (ref 22–29)
HCT VFR BLD AUTO: 29 % (ref 40–53)
HGB BLD-MCNC: 9.6 G/DL (ref 13.3–17.7)
MAGNESIUM SERPL-MCNC: 2 MG/DL (ref 1.7–2.3)
MCH RBC QN AUTO: 30.1 PG (ref 26.5–33)
MCHC RBC AUTO-ENTMCNC: 33.1 G/DL (ref 31.5–36.5)
MCV RBC AUTO: 91 FL (ref 78–100)
PHOSPHATE SERPL-MCNC: 3.2 MG/DL (ref 2.5–4.5)
PLATELET # BLD AUTO: 95 10E3/UL (ref 150–450)
POTASSIUM SERPL-SCNC: 3.8 MMOL/L (ref 3.4–5.3)
RBC # BLD AUTO: 3.19 10E6/UL (ref 4.4–5.9)
SODIUM SERPL-SCNC: 140 MMOL/L (ref 135–145)
WBC # BLD AUTO: 6.9 10E3/UL (ref 4–11)

## 2025-05-01 PROCEDURE — 97116 GAIT TRAINING THERAPY: CPT | Mod: GP | Performed by: PHYSICAL THERAPIST

## 2025-05-01 PROCEDURE — 99232 SBSQ HOSP IP/OBS MODERATE 35: CPT | Performed by: INTERNAL MEDICINE

## 2025-05-01 PROCEDURE — 250N000012 HC RX MED GY IP 250 OP 636 PS 637: Performed by: INTERNAL MEDICINE

## 2025-05-01 PROCEDURE — 250N000012 HC RX MED GY IP 250 OP 636 PS 637: Performed by: NEUROLOGICAL SURGERY

## 2025-05-01 PROCEDURE — 85014 HEMATOCRIT: CPT | Performed by: STUDENT IN AN ORGANIZED HEALTH CARE EDUCATION/TRAINING PROGRAM

## 2025-05-01 PROCEDURE — 250N000013 HC RX MED GY IP 250 OP 250 PS 637: Performed by: STUDENT IN AN ORGANIZED HEALTH CARE EDUCATION/TRAINING PROGRAM

## 2025-05-01 PROCEDURE — 120N000001 HC R&B MED SURG/OB

## 2025-05-01 PROCEDURE — 84100 ASSAY OF PHOSPHORUS: CPT | Performed by: STUDENT IN AN ORGANIZED HEALTH CARE EDUCATION/TRAINING PROGRAM

## 2025-05-01 PROCEDURE — 36415 COLL VENOUS BLD VENIPUNCTURE: CPT | Performed by: STUDENT IN AN ORGANIZED HEALTH CARE EDUCATION/TRAINING PROGRAM

## 2025-05-01 PROCEDURE — 80051 ELECTROLYTE PANEL: CPT | Performed by: STUDENT IN AN ORGANIZED HEALTH CARE EDUCATION/TRAINING PROGRAM

## 2025-05-01 PROCEDURE — 82330 ASSAY OF CALCIUM: CPT | Performed by: STUDENT IN AN ORGANIZED HEALTH CARE EDUCATION/TRAINING PROGRAM

## 2025-05-01 PROCEDURE — 83735 ASSAY OF MAGNESIUM: CPT | Performed by: STUDENT IN AN ORGANIZED HEALTH CARE EDUCATION/TRAINING PROGRAM

## 2025-05-01 PROCEDURE — 92526 ORAL FUNCTION THERAPY: CPT | Mod: GN | Performed by: REHABILITATION PRACTITIONER

## 2025-05-01 PROCEDURE — 97530 THERAPEUTIC ACTIVITIES: CPT | Mod: GP | Performed by: PHYSICAL THERAPIST

## 2025-05-01 PROCEDURE — 250N000011 HC RX IP 250 OP 636: Performed by: NURSE PRACTITIONER

## 2025-05-01 PROCEDURE — 250N000013 HC RX MED GY IP 250 OP 250 PS 637: Performed by: INTERNAL MEDICINE

## 2025-05-01 RX ORDER — DEXAMETHASONE 2 MG/1
2 TABLET ORAL DAILY
Status: COMPLETED | OUTPATIENT
Start: 2025-05-01 | End: 2025-05-03

## 2025-05-01 RX ORDER — DEXAMETHASONE 1 MG
1 TABLET ORAL DAILY
Status: DISCONTINUED | OUTPATIENT
Start: 2025-05-04 | End: 2025-05-04 | Stop reason: HOSPADM

## 2025-05-01 RX ADMIN — ATORVASTATIN CALCIUM 40 MG: 40 TABLET, FILM COATED ORAL at 08:45

## 2025-05-01 RX ADMIN — LEVOTHYROXINE SODIUM 125 MCG: 125 TABLET ORAL at 08:46

## 2025-05-01 RX ADMIN — INSULIN GLARGINE 35 UNITS: 100 INJECTION, SOLUTION SUBCUTANEOUS at 10:06

## 2025-05-01 RX ADMIN — ISOSORBIDE MONONITRATE 30 MG: 30 TABLET, EXTENDED RELEASE ORAL at 08:45

## 2025-05-01 RX ADMIN — LEVETIRACETAM 1500 MG: 750 TABLET, FILM COATED ORAL at 08:45

## 2025-05-01 RX ADMIN — INSULIN GLARGINE 35 UNITS: 100 INJECTION, SOLUTION SUBCUTANEOUS at 22:09

## 2025-05-01 RX ADMIN — LEVETIRACETAM 1500 MG: 750 TABLET, FILM COATED ORAL at 20:48

## 2025-05-01 RX ADMIN — DEXAMETHASONE 2 MG: 2 TABLET ORAL at 10:05

## 2025-05-01 ASSESSMENT — ACTIVITIES OF DAILY LIVING (ADL)
ADLS_ACUITY_SCORE: 51
ADLS_ACUITY_SCORE: 48
ADLS_ACUITY_SCORE: 48
ADLS_ACUITY_SCORE: 51
ADLS_ACUITY_SCORE: 48
ADLS_ACUITY_SCORE: 51
ADLS_ACUITY_SCORE: 51
ADLS_ACUITY_SCORE: 48
ADLS_ACUITY_SCORE: 48
ADLS_ACUITY_SCORE: 51
ADLS_ACUITY_SCORE: 51
ADLS_ACUITY_SCORE: 48
ADLS_ACUITY_SCORE: 52
ADLS_ACUITY_SCORE: 48
ADLS_ACUITY_SCORE: 48
ADLS_ACUITY_SCORE: 51
ADLS_ACUITY_SCORE: 48
ADLS_ACUITY_SCORE: 48
ADLS_ACUITY_SCORE: 51

## 2025-05-01 NOTE — PLAN OF CARE
Pt POD 10 R crani tumor resection. A&O x4. Neuros intact ex. Slight L droop. Pt [place] and has missing teeth. VSS. Tele A fib CVR. Mod carb easy chew diet, clear liquids. Takes pills whole. Up with A1 GB/W. Denies pain. R crani incision with sutures is TYRELL. Pt scoring green on the Aggression Stop Light Tool. Plan to discharge to ARU pending placement.

## 2025-05-01 NOTE — PROGRESS NOTES
Neurosurgery progress note     Platelet count today 97k.  Yesterday was 103K.  Patient states he is feeling well.  Denies headache.  Reports low back pain.  Particularly with movement.  He did have 2 falls,  2 days ago.     Exam     Alert and oriented no acute distress  Moving all extremities  Finger-nose slow and accurate  Negative pronator drift  Extraocular movements intact  Pupils equal and reactive     Cranial incision clean dry and intact     Mild tenderness to palpation in the lower lumbar spine     Assessment     Postop day 8 status post right frontotemporal craniotomy, performed with Dr. Erickson, pathology revealed metastatic small cell carcinoma.        Plan     If drop <75k, should transfuse for goal platelets >75k.  CBC today pending     Will obtain lumbar x-ray today for back pain, did fall twice 2 days ago.     -7th floor q4H neuro checks    -Pain control  -Decadron 2 weeks to off orders in   -Seizure meds per neurology, currently on Keppra 1500 mg twice daily  - Appreciate hospitalist medical management  -Speech therapy consult  -Wound cares. May leave open to air, cover incision with light gauze if patient is picking at it.  -Rad Onc following  -May resume apixaban for A-fib on POD 14.     Addendum 2:50 PM     X-ray negative for fracture, continue with conservative therapies for back pain.  Activity as tolerated.

## 2025-05-01 NOTE — PLAN OF CARE
Goal Outcome Evaluation:      Plan of Care Reviewed With: patient    Overall Patient Progress: no changeOverall Patient Progress: no change    Orientation: Aox4. Slight left droop.     Vitals/Tele: VSS, room air. Denies pain. Afib-CVR    IV Access/drains: PIV, SL    Diet: Easy to chew, thin liquids    Mobility: x1 +GBW    GI/: Continent    Wound/Skin: Craniotomy, TYRELL    Discharge Plan: Pending ARU placement      See Flow sheets for assessment

## 2025-05-01 NOTE — PROGRESS NOTES
"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 Admitted on 4/16/2025.      Brain mass with hemorrhage, vasogenic edema   Encephalopathy due to brain mass/brain edema  High grade neuroendocrine tumor, large cell type, left parotid gland s/p chemoradiation   *Presents with increased lethargy, somnolence, word-finding and memory difficulty, generalized weakness in legs   *CT head with 5.3 x 4 x 3.8 cm right temporal lobe mass with associated hemorrhagic aspect, significant surrounding vasogenic edema, right to left midline shift 6.6 mm  *MRI brain with 5.3 x 3.8 x 4.3 cm right temporal lobe mass with extensive surrounding vasogenic edema, hemorrhagic signal within the mass, right to left midline shift 4 to 5 mm  *Hx of neuroendocrine tumor as above, follows with [facility] (Dr. Butler), felt to be in remission  Neurosurgery consult requested.  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  Please see cancer treatment history outlined by Dr. Bravo in his note from 4/23.  Essentially, he notes that after a patient received a course of definitive chemoradiation therapy for left parotid small cell carcinoma a PET CT on 12/23/2024 showed complete metabolic response with no evidence of residual disease.  Per Dr. Bravo on 4/30, \"Given his limited disease extent and improved clinical status, I discussed recommendations for post-operative stereotactic radiation therapy to the surgical cavity and residual disease to 3000 cGy in 5 fractions.   We will plan for brain MRI and CT simulation in 2-3 weeks pending " "his discharge from the hospital and rehab with initiation of treatment approximately 1 week later.\"  I appreciate his followup.  Neurochecks, steroids, wound cares, pain control per neurosurgery  AEDs per general neurology, now on Keppra 1500 mg twice daily  Goal SBP <150, PRN IV labetalol, hydralazine ordered    Moderate oral and pharyngeal dysphagia  Now on IDDSI level 5 (minced/moist) diet and thin liquids  Continue swallowing treatments with SLP  4/28: Discussed with nutrition.  Patient is eating well, ate 100% of his tray and is asking for more food.  Taper and discontinue tube feedings, leave enteral tube in place for now.  4/29: Patient is once again eating 100% of meals.  Removed feeding tube.  5/1: Per SLP, \"patient's wall function is improving but remains below his baseline.  Recommended advance to regular solids.\"    Falls  Back pain  RRT was called x 2 on 4/28 for falls, not resulting in significant injury.  The first episode was unwitnessed, patient reported that his left knee gave way and he \"sat down.\"  The second episode was assisted, patient was lowered back onto his bed.  Fall precautions were added  Continue PT, OT  He has high risk of hospital-acquired delirium due to structural brain disease, advanced age, CNS acting medications, including steroids.  Added as needed melatonin to promote restful sleep.  As needed quetiapine also available for agitated delirium.  Needs to be up with assist only, including use of gait belt and walker  Also had bedside attendant, discontinued 4/29  PT, patient complained of back pain.  Neurosurgery ordered x-ray of lumbar spine which shows osteopenia, is otherwise unremarkable.    Coronary artery disease status post CABG  LVH  Mild troponin elevation  Postoperative hypotension, CODE BLUE activated  Troponin 42->45. Denies chest pain. EKG reviewed by cardiology initially due to ST elevations in setting of repolarization abnormality, not felt to be ischemic. Likely " "secondary to brain mass/bleed.   See postoperative note from Dr. Erickson, patient had \"hypotensive episode following extubation in the PACU to the point that he required activation of CODE BLUE.  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 Creatinine 1.22 on admission.   Currently around baseline  Avoid nephrotoxins  Monitor BMP      Diabetes mellitus, type 2, on long term insulin   HgbA1c 6.9% 2/10/25. Prior to admission on glargine 40 units BID, aspart 3 units per carb unit.  4/28:Remains on insulin gtt, 6 units/hr  We discussed diabetes management.  He says his diabetes was diagnosed 35 years ago, he checks his own blood sugars and recognizes symptoms of hyper and hypoglycemia.  He was able to describe what he would do if his blood sugar was low.  He recalls his prior to admission dose of basal insulin.  4/28:  Discontinue insulin drip, resume PTA basal insulin, Lantus 40 units twice daily and PTA prandial insulin at slightly lower dose, 1 unit per 15 g of carbohydrate with high scale corrective dose insulin  Hypoglycemia protocol  5/1: Morning blood sugar reading was 57 mg/dL.  Patient ate all of his breakfast, follow-up blood sugar 163.  Decreased from high correction scale to medium correction scale, decreased Lantus from 40 to 35 units twice daily.     Hyperlipidemia  Continue PTA atorvastatin, fenofibrate      Hypothyroidism  Continue PTA levothyroxine           Diet: Room Service  Snacks/Supplements Adult: Other; offer supplement with meals; With Meals  Regular Diet Adult (supervision due to impulsivenss, slow rate, small bites/sips, alternate liquids/solids. Hold diet if increased coughing or changes in his respiratory status.)    DVT Prophylaxis: Pneumatic Compression Devices  Duran Catheter: Not " "present  Lines: None     Cardiac Monitoring: ACTIVE order. Indication: Tachyarrhythmias, acute (48 hours)  Code Status: Full Code      Clinically Significant Risk Factors               # Hypoalbuminemia: Lowest albumin = 3.3 g/dL at 4/23/2025  5:31 PM, will monitor as appropriate   # Thrombocytopenia: Lowest platelets = 95 in last 2 days, will monitor for bleeding   # Hypertension: Noted on problem list           # DMII: A1C = 7.9 % (Ref range: <5.7 %) within past 6 months   # Overweight: Estimated body mass index is 25.64 kg/m  as calculated from the following:    Height as of this encounter: 1.803 m (5' 11\").    Weight as of this encounter: 83.4 kg (183 lb 13.8 oz).        # Financial/Environmental Concerns:           Social Drivers of Health           Disposition Plan     Medically Ready for Discharge: Anticipated in 2-4 Days       Hilaria Ly MD  Hospitalist Service  [facility]  Securely message with [employer] (more info)  Text page via [employer] Paging/Directory   ______________________________________________________________________    Interval History   \"Pretty good.\"  Dayday says he does not have many symptoms associated with low blood sugar readings, but he wears a Dexcom monitor and the monitor alerts him when his blood sugar is low.  He continues to feel really good, says he went for a long walk with therapist today, his back hurt a little bit when he returned.  He has no respiratory or GI complaints.    Physical Exam   Vital Signs: Temp: 97  F (36.1  C) Temp src: Oral BP: 112/56 Pulse: 68   Resp: 17 SpO2: 98 % O2 Device: None (Room air)    Weight: 183 lbs 13.82 oz    Constitutional: Awake, alert, appropriate  HEENT:  long incision right frontotemporal scalp without redness or drainage  Respiratory: Clear to auscultation bilaterally, no crackles or wheezing  Cardiovascular: Irregularly irregular rhythm with murmur  GI: Normal bowel sounds, soft, non-distended, " non-tender  Skin/Integumen: No rash on exposed skin.  No lower extremity edema  Other: Mood is pleasant and outgoing    Medical Decision Making       45 MINUTES SPENT BY ME on the date of service doing chart review, history, exam, documentation & further activities per the note.   Including discussion with patient and bedside RN    Data     I have personally reviewed the following data over the past 24 hrs:    6.9  \   9.6 (L)   / 95 (L)     140 105 19.9 /  135 (H)   3.8 27 0.90 \       Imaging results reviewed over the past 24 hrs:   Recent Results (from the past 24 hours)   XR Lumbar Spine 2/3 Views    Narrative    EXAM: XR LUMBAR SPINE 2/3 VIEWS  LOCATION: [facility]  DATE: 4/30/2025    INDICATION: Low back pain after falls.  COMPARISON: None.      Impression    IMPRESSION: Bones appear osteopenic which limits evaluation for fractures. Mild grade 1 anterolisthesis of L5 on S1. No obvious loss of vertebral body height by plain film evaluation. Moderate degenerative endplate changes and loss of disc height at   L5-S1.

## 2025-05-01 NOTE — PLAN OF CARE
Reason for Admission: POD 10 craniotomy     Cognitive/Mentation: A/Ox 4  Neuros/CMS: Intact ex slight L droop  VS: VSS on RA.   Tele: afib CVR.  /GI: Continent. Last BM 5/1/25. Loose.  Pulmonary: LS clear.  Pain: lower back pain with movement     Drains/Lines: new PIV SL  Skin: scattered bruising, scalp incision TYRELL approximated.  Activity: Assist x 1 with walker/GB.  Diet: regular with thin liquids. Takes pills whole with water.      Therapies recs: ARU  Discharge: pending     Aggression Stoplight Tool: green    End of shift summary: BS 57 this AM, improved with juice and breakfast.

## 2025-05-01 NOTE — PROGRESS NOTES
Neurosurgery progress note     Platelet count 95K today.    Patient denies headache, states back pain is improved.  X-rays yesterday negative for fracture.     Exam     Alert and oriented no acute distress  Moving all extremities  Finger-nose slow and accurate  Negative pronator drift  Extraocular movements intact  Pupils equal and reactive     Cranial incision clean dry and intact     Assessment     Postop day 9 status post right frontotemporal craniotomy, performed with Dr. Erickson, pathology revealed metastatic small cell carcinoma.        Plan     If drop <75k, should transfuse for goal platelets >75k.  CBC today pending        -7th floor q4H neuro checks    -Pain control  -Decadron 2 weeks taper to off orders in   -Seizure meds per neurology, currently on Keppra 1500 mg twice daily  - Appreciate hospitalist medical management  -Speech therapy consult  -Wound cares. May leave open to air, cover incision with light gauze if patient is picking at it.  -Rad Onc following  -May resume apixaban for A-fib on POD 14.

## 2025-05-02 ENCOUNTER — APPOINTMENT (OUTPATIENT)
Dept: OCCUPATIONAL THERAPY | Facility: CLINIC | Age: 85
DRG: 025 | End: 2025-05-02
Attending: NEUROLOGICAL SURGERY
Payer: COMMERCIAL

## 2025-05-02 ENCOUNTER — APPOINTMENT (OUTPATIENT)
Dept: PHYSICAL THERAPY | Facility: CLINIC | Age: 85
DRG: 025 | End: 2025-05-02
Attending: NEUROLOGICAL SURGERY
Payer: COMMERCIAL

## 2025-05-02 LAB
ANION GAP SERPL CALCULATED.3IONS-SCNC: 10 MMOL/L (ref 7–15)
BUN SERPL-MCNC: 16.6 MG/DL (ref 8–23)
CA-I BLD-MCNC: 4.9 MG/DL (ref 4.4–5.2)
CALCIUM SERPL-MCNC: 9.2 MG/DL (ref 8.8–10.4)
CHLORIDE SERPL-SCNC: 104 MMOL/L (ref 98–107)
CREAT SERPL-MCNC: 0.87 MG/DL (ref 0.67–1.17)
EGFRCR SERPLBLD CKD-EPI 2021: 86 ML/MIN/1.73M2
ERYTHROCYTE [DISTWIDTH] IN BLOOD BY AUTOMATED COUNT: 15.3 % (ref 10–15)
GLUCOSE BLDC GLUCOMTR-MCNC: 106 MG/DL (ref 70–99)
GLUCOSE BLDC GLUCOMTR-MCNC: 193 MG/DL (ref 70–99)
GLUCOSE BLDC GLUCOMTR-MCNC: 205 MG/DL (ref 70–99)
GLUCOSE BLDC GLUCOMTR-MCNC: 66 MG/DL (ref 70–99)
GLUCOSE BLDC GLUCOMTR-MCNC: 78 MG/DL (ref 70–99)
GLUCOSE SERPL-MCNC: 62 MG/DL (ref 70–99)
HCO3 SERPL-SCNC: 26 MMOL/L (ref 22–29)
HCT VFR BLD AUTO: 29.5 % (ref 40–53)
HGB BLD-MCNC: 9.8 G/DL (ref 13.3–17.7)
MAGNESIUM SERPL-MCNC: 1.9 MG/DL (ref 1.7–2.3)
MCH RBC QN AUTO: 30.2 PG (ref 26.5–33)
MCHC RBC AUTO-ENTMCNC: 33.2 G/DL (ref 31.5–36.5)
MCV RBC AUTO: 91 FL (ref 78–100)
PHOSPHATE SERPL-MCNC: 3.4 MG/DL (ref 2.5–4.5)
PLATELET # BLD AUTO: 82 10E3/UL (ref 150–450)
POTASSIUM SERPL-SCNC: 4.1 MMOL/L (ref 3.4–5.3)
RBC # BLD AUTO: 3.25 10E6/UL (ref 4.4–5.9)
SODIUM SERPL-SCNC: 140 MMOL/L (ref 135–145)
WBC # BLD AUTO: 5.2 10E3/UL (ref 4–11)

## 2025-05-02 PROCEDURE — 36415 COLL VENOUS BLD VENIPUNCTURE: CPT | Performed by: STUDENT IN AN ORGANIZED HEALTH CARE EDUCATION/TRAINING PROGRAM

## 2025-05-02 PROCEDURE — 97112 NEUROMUSCULAR REEDUCATION: CPT | Mod: GP | Performed by: PHYSICAL THERAPIST

## 2025-05-02 PROCEDURE — 83735 ASSAY OF MAGNESIUM: CPT | Performed by: STUDENT IN AN ORGANIZED HEALTH CARE EDUCATION/TRAINING PROGRAM

## 2025-05-02 PROCEDURE — 250N000013 HC RX MED GY IP 250 OP 250 PS 637: Performed by: STUDENT IN AN ORGANIZED HEALTH CARE EDUCATION/TRAINING PROGRAM

## 2025-05-02 PROCEDURE — 97535 SELF CARE MNGMENT TRAINING: CPT | Mod: GO

## 2025-05-02 PROCEDURE — 84100 ASSAY OF PHOSPHORUS: CPT | Performed by: STUDENT IN AN ORGANIZED HEALTH CARE EDUCATION/TRAINING PROGRAM

## 2025-05-02 PROCEDURE — 250N000012 HC RX MED GY IP 250 OP 636 PS 637: Performed by: NEUROLOGICAL SURGERY

## 2025-05-02 PROCEDURE — 120N000001 HC R&B MED SURG/OB

## 2025-05-02 PROCEDURE — 97530 THERAPEUTIC ACTIVITIES: CPT | Mod: GP | Performed by: PHYSICAL THERAPIST

## 2025-05-02 PROCEDURE — 82330 ASSAY OF CALCIUM: CPT | Performed by: STUDENT IN AN ORGANIZED HEALTH CARE EDUCATION/TRAINING PROGRAM

## 2025-05-02 PROCEDURE — 99232 SBSQ HOSP IP/OBS MODERATE 35: CPT | Performed by: INTERNAL MEDICINE

## 2025-05-02 PROCEDURE — 250N000013 HC RX MED GY IP 250 OP 250 PS 637: Performed by: INTERNAL MEDICINE

## 2025-05-02 PROCEDURE — 80051 ELECTROLYTE PANEL: CPT | Performed by: STUDENT IN AN ORGANIZED HEALTH CARE EDUCATION/TRAINING PROGRAM

## 2025-05-02 PROCEDURE — 97116 GAIT TRAINING THERAPY: CPT | Mod: GP | Performed by: PHYSICAL THERAPIST

## 2025-05-02 PROCEDURE — 85027 COMPLETE CBC AUTOMATED: CPT | Performed by: STUDENT IN AN ORGANIZED HEALTH CARE EDUCATION/TRAINING PROGRAM

## 2025-05-02 PROCEDURE — 250N000013 HC RX MED GY IP 250 OP 250 PS 637: Performed by: NURSE PRACTITIONER

## 2025-05-02 PROCEDURE — 97129 THER IVNTJ 1ST 15 MIN: CPT | Mod: GO

## 2025-05-02 RX ADMIN — INSULIN GLARGINE 35 UNITS: 100 INJECTION, SOLUTION SUBCUTANEOUS at 08:52

## 2025-05-02 RX ADMIN — LEVETIRACETAM 1500 MG: 750 TABLET, FILM COATED ORAL at 20:08

## 2025-05-02 RX ADMIN — ATORVASTATIN CALCIUM 40 MG: 40 TABLET, FILM COATED ORAL at 08:52

## 2025-05-02 RX ADMIN — ISOSORBIDE MONONITRATE 30 MG: 30 TABLET, EXTENDED RELEASE ORAL at 08:51

## 2025-05-02 RX ADMIN — LEVETIRACETAM 1500 MG: 750 TABLET, FILM COATED ORAL at 08:52

## 2025-05-02 RX ADMIN — LEVOTHYROXINE SODIUM 125 MCG: 125 TABLET ORAL at 08:52

## 2025-05-02 RX ADMIN — DEXAMETHASONE 2 MG: 2 TABLET ORAL at 08:52

## 2025-05-02 RX ADMIN — OXYCODONE HYDROCHLORIDE 5 MG: 5 TABLET ORAL at 20:08

## 2025-05-02 ASSESSMENT — ACTIVITIES OF DAILY LIVING (ADL)
ADLS_ACUITY_SCORE: 49
ADLS_ACUITY_SCORE: 49
ADLS_ACUITY_SCORE: 51
ADLS_ACUITY_SCORE: 49
ADLS_ACUITY_SCORE: 52
ADLS_ACUITY_SCORE: 52
ADLS_ACUITY_SCORE: 49
ADLS_ACUITY_SCORE: 51
ADLS_ACUITY_SCORE: 49
ADLS_ACUITY_SCORE: 49
ADLS_ACUITY_SCORE: 51
ADLS_ACUITY_SCORE: 51
ADLS_ACUITY_SCORE: 49
ADLS_ACUITY_SCORE: 51
ADLS_ACUITY_SCORE: 49
ADLS_ACUITY_SCORE: 51
ADLS_ACUITY_SCORE: 51
ADLS_ACUITY_SCORE: 52
ADLS_ACUITY_SCORE: 49
ADLS_ACUITY_SCORE: 52
ADLS_ACUITY_SCORE: 51
ADLS_ACUITY_SCORE: 49
ADLS_ACUITY_SCORE: 51

## 2025-05-02 NOTE — PROGRESS NOTES
Care Management Follow Up    Length of Stay (days): 10    Expected Discharge Date: 05/03/2025     Concerns to be Addressed: discharge planning       Patient plan of care discussed at interdisciplinary rounds: Yes    Anticipated Discharge Disposition: [facility]  Anticipated Discharge Services: therapies    Referrals Placed by CM/YARELIS:  post acute facilities   Private pay costs discussed: Not applicable    Discussed  Partnership in Safe Discharge Planning  document with patient/family: No     Handoff Completed: No, handoff not indicated or clinically appropriate    Additional Information:  [facility] does not have any bed availability today/tomorrow but is likely to have a bed open on Sunday for patient. Patient is asleep this afternoon so SW called daughter Ananya to update her and make plans for Sunday. Family will transport to [facility] on 5/4 at noon. Transportation instructions left in the room for Ananya, she will be here this evening and will look for them.     Anabell Rowan, MARKEL, LGSW   Social Work   [facility]

## 2025-05-02 NOTE — PROGRESS NOTES
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 Admitted on 4/16/2025.      Brain mass with hemorrhage, vasogenic edema   Encephalopathy due to brain mass/brain edema  High grade neuroendocrine tumor, large cell type, left parotid gland s/p chemoradiation   *Presents with increased lethargy, somnolence, word-finding and memory difficulty, generalized weakness in legs   *CT head with 5.3 x 4 x 3.8 cm right temporal lobe mass with associated hemorrhagic aspect, significant surrounding vasogenic edema, right to left midline shift 6.6 mm  *MRI brain with 5.3 x 3.8 x 4.3 cm right temporal lobe mass with extensive surrounding vasogenic edema, hemorrhagic signal within the mass, right to left midline shift 4 to 5 mm  *Hx of neuroendocrine tumor as above, follows with [facility] (Dr. Butler), felt to be in remission  Neurosurgery consult requested.  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  Please see cancer treatment history outlined by Dr. Bravo in his note from 4/23.  Essentially, he notes that after a patient received a course of definitive chemoradiation therapy for left parotid small cell carcinoma a PET CT on 12/23/2024 showed complete metabolic response with no evidence of residual disease.  Per Dr. Bravo on 4/30, \"Given his limited disease extent and improved clinical status, I discussed recommendations for post-operative stereotactic radiation therapy to the surgical cavity and residual disease to 3000 cGy in 5 fractions.   We will plan for brain MRI and CT simulation in 2-3 weeks pending " "his discharge from the hospital and rehab with initiation of treatment approximately 1 week later.\"  I appreciate his followup.  Neurochecks, steroids, wound cares, pain control per neurosurgery  AEDs per general neurology, now on Keppra 1500 mg twice daily  Goal SBP <150, PRN IV labetalol, hydralazine ordered    Moderate oral and pharyngeal dysphagia  Now on IDDSI level 5 (minced/moist) diet and thin liquids  Continue swallowing treatments with SLP  4/28: Discussed with nutrition.  Patient is eating well, ate 100% of his tray and is asking for more food.  Taper and discontinue tube feedings, leave enteral tube in place for now.  4/29: Patient is once again eating 100% of meals.  Removed feeding tube.  5/1: Per SLP, \"patient's wall function is improving but remains below his baseline.  Recommended advance to regular solids.\"    Falls  Back pain  RRT was called x 2 on 4/28 for falls, not resulting in significant injury.  The first episode was unwitnessed, patient reported that his left knee gave way and he \"sat down.\"  The second episode was assisted, patient was lowered back onto his bed.  Fall precautions were added  Continue PT, OT  He has high risk of hospital-acquired delirium due to structural brain disease, advanced age, CNS acting medications, including steroids.  Added as needed melatonin to promote restful sleep.  As needed quetiapine also available for agitated delirium.  Needs to be up with assist only, including use of gait belt and walker  Also had bedside attendant, discontinued 4/29  PT, patient complained of back pain.  Neurosurgery ordered x-ray of lumbar spine which shows osteopenia, is otherwise unremarkable.  5/1:  patient says back pain is improving    Coronary artery disease status post CABG  LVH  Mild troponin elevation  Postoperative hypotension, CODE BLUE activated  Troponin 42->45. Denies chest pain. EKG reviewed by cardiology initially due to ST elevations in setting of repolarization " "abnormality, not felt to be ischemic. Likely secondary to brain mass/bleed.   See postoperative note from Dr. Erickson, patient had \"hypotensive episode following extubation in the PACU to the point that he required activation of CODE BLUE.  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 Creatinine 1.22 on admission.   Currently around baseline  Avoid nephrotoxins  Monitor BMP      Diabetes mellitus, type 2, on long term insulin   HgbA1c 6.9% 2/10/25. Prior to admission on glargine 40 units BID, aspart 3 units per carb unit.  4/28:Remains on insulin gtt, 6 units/hr  We discussed diabetes management.  He says his diabetes was diagnosed 35 years ago, he checks his own blood sugars and recognizes symptoms of hyper and hypoglycemia.  He was able to describe what he would do if his blood sugar was low.  He recalls his prior to admission dose of basal insulin.  4/28:  Discontinue insulin drip, resume PTA basal insulin, Lantus 40 units twice daily and PTA prandial insulin at slightly lower dose, 1 unit per 15 g of carbohydrate with high scale corrective dose insulin  Hypoglycemia protocol  5/1: Morning blood sugar reading was 57 mg/dL.  Patient ate all of his breakfast, follow-up blood sugar 163.  Decreased from high correction scale to medium correction scale, decreased Lantus from 40 to 35 units twice daily.  5/2:  morning blood sugar reading was 62 mg/dl.  Patient again says he does not have symptoms with hypoglycemia but that his wearable monitor (freestyle harry) alerts him with low blood sugar readings when he is at home.  Reduced Lantus to 30 units twice daily, reduced prandial NovoLog to 1 unit per 20 g of carbohydrate     Hyperlipidemia  Continue PTA atorvastatin, fenofibrate      Hypothyroidism  Continue PTA " "levothyroxine           Diet: Room Service  Snacks/Supplements Adult: Other; offer supplement with meals; With Meals  Regular Diet Adult (supervision due to impulsivenss, slow rate, small bites/sips, alternate liquids/solids. Hold diet if increased coughing or changes in his respiratory status.)    DVT Prophylaxis: Pneumatic Compression Devices  Duran Catheter: Not present  Lines: None     Cardiac Monitoring: ACTIVE order. Indication: Tachyarrhythmias, acute (48 hours)  Code Status: Full Code      Clinically Significant Risk Factors               # Hypoalbuminemia: Lowest albumin = 3.3 g/dL at 4/23/2025  5:31 PM, will monitor as appropriate   # Thrombocytopenia: Lowest platelets = 82 in last 2 days, will monitor for bleeding   # Hypertension: Noted on problem list           # DMII: A1C = 7.9 % (Ref range: <5.7 %) within past 6 months   # Overweight: Estimated body mass index is 25.64 kg/m  as calculated from the following:    Height as of this encounter: 1.803 m (5' 11\").    Weight as of this encounter: 83.4 kg (183 lb 13.8 oz).        # Financial/Environmental Concerns:           Social Drivers of Health           Disposition Plan     Medically Ready for Discharge: Anticipated in 2-4 Days       Hilaria Ly MD  Hospitalist Service  [facility]  Securely message with [employer] (more info)  Text page via [employer] Paging/Directory   ______________________________________________________________________    Interval History   \"Either my back pain is getting better or I am getting used to it.\"  Patient says he was working with PT, his back pain feels better.  He again asks, \"why do I have to go to that rehab?\"  He says he lives in a senior apartment, he thinks he could manage on his own.  (I discussed with RN, she says patient still needs significant assistance with transfers.) he has no respiratory or GI complaints.    Physical Exam   Vital Signs: Temp: 97.3  F (36.3  C) Temp src: Axillary BP: " 109/52 Pulse: 52   Resp: 18 SpO2: 98 % O2 Device: None (Room air)    Weight: 183 lbs 13.82 oz    Constitutional: Dozing, wakes to voice  HEENT:  long incision right frontotemporal scalp without redness or drainage  Respiratory: Clear to auscultation bilaterally, no crackles or wheezing  Cardiovascular: bradycardic, with murmur in systole  GI: Normal bowel sounds, soft, non-distended, non-tender  Skin/Integumen: No rash on exposed skin.  No lower extremity edema  Other: Mood is pleasant and outgoing    Medical Decision Making       40 MINUTES SPENT BY ME on the date of service doing chart review, history, exam, documentation & further activities per the note.   Including discussion with patient and bedside RN, also social work    Data     I have personally reviewed the following data over the past 24 hrs:    5.2  \   9.8 (L)   / 82 (L)     140 104 16.6 /  62 (L)   4.1 26 0.87 \       Imaging results reviewed over the past 24 hrs:   No results found for this or any previous visit (from the past 24 hours).

## 2025-05-02 NOTE — PROGRESS NOTES
CLINICAL NUTRITION SERVICES - REASSESSMENT NOTE         Recommendations:  Continue to assist with meal ordering           CURRENT NUTRITION ORDERS  Diet: Regular          Room Service with Assist    CURRENT INTAKE/TOLERANCE  Chart reviewed  FT was pulled 4/29    Pt has been ordering full meals TID  Per meal records, on average pt has been receiving ~2300 cals/day and ~90 gm pro/day  Meeting nutrition needs     NEW FINDINGS  GI symptoms: +BM this morning      Skin/wounds: scattered bruising on extremities       Nutrition-relevant labs: Reviewed    Nutrition-relevant medications: Reviewed    Weight:   04/26/25 0400 83.4 kg (183 lb 13.8 oz) --   04/25/25 0600 85.7 kg (188 lb 15 oz) Bed scale   04/23/25 0342 81.3 kg (179 lb 3.7 oz) Bed scale   04/22/25 1430 82.2 kg (181 lb 3.5 oz) --   04/22/25 0617 83.8 kg (184 lb 11.2 oz) --       ASSESSED NUTRITION NEEDS  Dosing wt: 83.8 kg  Energy: 2982-8141 kcals/day (25 - 30 kcals/kg of admission wt)  Justification:  Post operative, maintenance  Protein: 100-126 grams protein/day (1.2 - 1.5 grams of pro/kg of admission wt)  Justification: Surgical recovery    MALNUTRITION (4/28)  % Intake: Decreased intake does not meet criteria - has been receiving nutrition via EN, now tolerating po  % Weight Loss: None noted  Subcutaneous Fat Loss:  deferred  Muscle Loss:  deferred  Fluid Accumulation/Edema: None noted  Malnutrition Diagnosis: Patient does not meet two of the established criteria necessary for diagnosing malnutrition  Malnutrition Present on Admission: No    EVALUATION OF THE PROGRESS TOWARD GOALS   Previous Goals (4/28)  Pt to consume 75% meals   Evaluation: Met      Previous Nutrition Diagnosis (4/28)  No nutrition diagnosis at this time   Evaluation: No change    NUTRITION DIAGNOSIS  No nutrition diagnosis at this time     INTERVENTIONS  Continue to assist with meal ordering    GOALS  Pt to consume 75% meals     MONITORING/EVALUATION  Progress toward goals will be monitored  and evaluated per policy.

## 2025-05-02 NOTE — PROGRESS NOTES
May 2, 2025  Shift:0700-1930  Pascual Perea  84 year old  YOB: 1940    Reason for admission:Brain mass [G93.89]  S/P craniotomy [Z98.890]    Cognition/Mentation:AxOx4.   Neuros/CMS:Intact ex slight L droop and generalized weakness.   VS:VSS on RA. SBP goal <150.  Cardiac:VSS on RA  GI/:Continent. Up to bathroom. BMx1  Pulmonary:LS clear and equal  Pain:Low back pain. Abdominal binder in place for comfort.  Drains/Lines:PIV SL.  Skin:Bruising to R eye, scalp and scattered.    Activity:A1 GB/W or cane   Diet:Regular thin liquids. Pills whole.   Therapies recs:ARU  Discharge:ARU on 5/4 @ 12pm. Family to transport.     Shift summary:Worked with PT/OT/ST this shift. Neuros stable. Crani incision CDI open to air.

## 2025-05-02 NOTE — PROGRESS NOTES
Reason for Admission: POD 10 of craniotomy     Cognitive/Mentation: A Ox4  Neuros/CMS: Intact ex slight L side facial droop  VS: VSS  Tele: afib CVR.  /GI: Regular diet, pills whole + thin liq. Continent B/B. Voiding. Normoactive BS x4. Last BM 05/01. Pt refused scheduled senna at night d/t loose stool in day shift.  Pulmonary: Clear bilateral breath sound, stable on room air  Pain: lower back pain with movement  Drains/Lines: PIV SL in R forearm.  Skin: scattered bruising on extremities, craniotomy incision TYRELL, well approximated.  Activity: Assist x 1 w/ FWW/GB.   Plan: waiting for ARU placement.     Aggression Stoplight Tool: green

## 2025-05-02 NOTE — PROGRESS NOTES
"[facility]  Neurosurgery Daily Progress Note     POD#10 s/p right frontotemporal craniotomy  Pathology revealed metastatic small cell carcinoma     24 hours events: Plt 82 (95). No acute events overnight.     PLAN:  -Neuro checks Q4H  -Pain control measures as needed  -Plt goal > 75k  -Decadron 2 week taper to off, ordered  -Keppra 1500 mg BID per Neurology  -Wound care: May leave incision open to air, cover with light gauze if patient is picking at incision.  -May resume Eliquis for afib POD#14  -Speech therapy cleared patient for regular diet  -Hospitalist following  -[facility] recommending brain MRI and CT simulation in 2-3 weeks pending discharge from the hospital / rehab with initiation of treatment approximately 1 week later.   -Dispo: ARU per therapies, pending placement.    Plan reviewed with Dr. Erickson.     Corinne Fanta MSN, [facility] Neurosurgery  [facility]  Text page via [employer] Paging/Directory    Physical Exam   /52 (BP Location: Left arm)   Pulse 52   Temp 97.3  F (36.3  C) (Axillary)   Resp 18   Ht 1.803 m (5' 11\")   Wt 83.4 kg (183 lb 13.8 oz)   SpO2 98%   BMI 25.64 kg/m       Mental status:  Alert and oriented x 3, speech is fluent, following commands. Able to name 3 objects.   Cranial nerves:  II-XII grossly intact.   Motor:  Moves all extremities with appropriate strength. 5/5 BUE BLE  Sensation:  Intact to light touch throughout.  Coordination:  Smooth finger to nose testing.   Negative pronator drift.    Incision: CDI. No swelling, redness, warmth, or drainage.     "

## 2025-05-03 ENCOUNTER — APPOINTMENT (OUTPATIENT)
Dept: SPEECH THERAPY | Facility: CLINIC | Age: 85
DRG: 025 | End: 2025-05-03
Attending: NEUROLOGICAL SURGERY
Payer: COMMERCIAL

## 2025-05-03 ENCOUNTER — APPOINTMENT (OUTPATIENT)
Dept: OCCUPATIONAL THERAPY | Facility: CLINIC | Age: 85
DRG: 025 | End: 2025-05-03
Attending: NEUROLOGICAL SURGERY
Payer: COMMERCIAL

## 2025-05-03 ENCOUNTER — APPOINTMENT (OUTPATIENT)
Dept: PHYSICAL THERAPY | Facility: CLINIC | Age: 85
DRG: 025 | End: 2025-05-03
Attending: NEUROLOGICAL SURGERY
Payer: COMMERCIAL

## 2025-05-03 LAB
ANION GAP SERPL CALCULATED.3IONS-SCNC: 9 MMOL/L (ref 7–15)
BUN SERPL-MCNC: 19.1 MG/DL (ref 8–23)
CA-I BLD-MCNC: 4.7 MG/DL (ref 4.4–5.2)
CALCIUM SERPL-MCNC: 9.1 MG/DL (ref 8.8–10.4)
CHLORIDE SERPL-SCNC: 102 MMOL/L (ref 98–107)
CREAT SERPL-MCNC: 0.97 MG/DL (ref 0.67–1.17)
EGFRCR SERPLBLD CKD-EPI 2021: 77 ML/MIN/1.73M2
ERYTHROCYTE [DISTWIDTH] IN BLOOD BY AUTOMATED COUNT: 15.7 % (ref 10–15)
GLUCOSE BLDC GLUCOMTR-MCNC: 158 MG/DL (ref 70–99)
GLUCOSE BLDC GLUCOMTR-MCNC: 242 MG/DL (ref 70–99)
GLUCOSE BLDC GLUCOMTR-MCNC: 78 MG/DL (ref 70–99)
GLUCOSE BLDC GLUCOMTR-MCNC: 85 MG/DL (ref 70–99)
GLUCOSE BLDC GLUCOMTR-MCNC: 94 MG/DL (ref 70–99)
GLUCOSE SERPL-MCNC: 89 MG/DL (ref 70–99)
HCO3 SERPL-SCNC: 28 MMOL/L (ref 22–29)
HCT VFR BLD AUTO: 26.9 % (ref 40–53)
HGB BLD-MCNC: 9.3 G/DL (ref 13.3–17.7)
MAGNESIUM SERPL-MCNC: 1.8 MG/DL (ref 1.7–2.3)
MCH RBC QN AUTO: 30.9 PG (ref 26.5–33)
MCHC RBC AUTO-ENTMCNC: 34.6 G/DL (ref 31.5–36.5)
MCV RBC AUTO: 89 FL (ref 78–100)
PHOSPHATE SERPL-MCNC: 3.7 MG/DL (ref 2.5–4.5)
PLATELET # BLD AUTO: 78 10E3/UL (ref 150–450)
POTASSIUM SERPL-SCNC: 4 MMOL/L (ref 3.4–5.3)
RBC # BLD AUTO: 3.01 10E6/UL (ref 4.4–5.9)
SODIUM SERPL-SCNC: 139 MMOL/L (ref 135–145)
WBC # BLD AUTO: 4.6 10E3/UL (ref 4–11)

## 2025-05-03 PROCEDURE — 250N000013 HC RX MED GY IP 250 OP 250 PS 637: Performed by: INTERNAL MEDICINE

## 2025-05-03 PROCEDURE — 250N000013 HC RX MED GY IP 250 OP 250 PS 637: Performed by: NURSE PRACTITIONER

## 2025-05-03 PROCEDURE — 83735 ASSAY OF MAGNESIUM: CPT | Performed by: STUDENT IN AN ORGANIZED HEALTH CARE EDUCATION/TRAINING PROGRAM

## 2025-05-03 PROCEDURE — 97530 THERAPEUTIC ACTIVITIES: CPT | Mod: GP | Performed by: PHYSICAL THERAPIST

## 2025-05-03 PROCEDURE — 84100 ASSAY OF PHOSPHORUS: CPT | Performed by: STUDENT IN AN ORGANIZED HEALTH CARE EDUCATION/TRAINING PROGRAM

## 2025-05-03 PROCEDURE — 250N000012 HC RX MED GY IP 250 OP 636 PS 637: Performed by: NEUROLOGICAL SURGERY

## 2025-05-03 PROCEDURE — 80048 BASIC METABOLIC PNL TOTAL CA: CPT | Performed by: STUDENT IN AN ORGANIZED HEALTH CARE EDUCATION/TRAINING PROGRAM

## 2025-05-03 PROCEDURE — 36415 COLL VENOUS BLD VENIPUNCTURE: CPT | Performed by: STUDENT IN AN ORGANIZED HEALTH CARE EDUCATION/TRAINING PROGRAM

## 2025-05-03 PROCEDURE — 92526 ORAL FUNCTION THERAPY: CPT | Mod: GN

## 2025-05-03 PROCEDURE — 120N000001 HC R&B MED SURG/OB

## 2025-05-03 PROCEDURE — 99232 SBSQ HOSP IP/OBS MODERATE 35: CPT | Performed by: INTERNAL MEDICINE

## 2025-05-03 PROCEDURE — 250N000013 HC RX MED GY IP 250 OP 250 PS 637: Performed by: STUDENT IN AN ORGANIZED HEALTH CARE EDUCATION/TRAINING PROGRAM

## 2025-05-03 PROCEDURE — 97535 SELF CARE MNGMENT TRAINING: CPT | Mod: GO

## 2025-05-03 PROCEDURE — 82330 ASSAY OF CALCIUM: CPT | Performed by: STUDENT IN AN ORGANIZED HEALTH CARE EDUCATION/TRAINING PROGRAM

## 2025-05-03 PROCEDURE — 97116 GAIT TRAINING THERAPY: CPT | Mod: GP | Performed by: PHYSICAL THERAPIST

## 2025-05-03 PROCEDURE — 85041 AUTOMATED RBC COUNT: CPT | Performed by: STUDENT IN AN ORGANIZED HEALTH CARE EDUCATION/TRAINING PROGRAM

## 2025-05-03 RX ORDER — DEXAMETHASONE 1 MG
TABLET ORAL
Qty: 5 TABLET | Refills: 0 | Status: ON HOLD | OUTPATIENT
Start: 2025-05-03 | End: 2025-05-09

## 2025-05-03 RX ORDER — AMOXICILLIN 250 MG
1 CAPSULE ORAL 2 TIMES DAILY PRN
Qty: 28 TABLET | Refills: 1 | Status: ON HOLD | OUTPATIENT
Start: 2025-05-03 | End: 2025-05-24

## 2025-05-03 RX ORDER — OXYCODONE HYDROCHLORIDE 5 MG/1
5 TABLET ORAL EVERY 6 HOURS PRN
Qty: 30 TABLET | Refills: 0 | Status: ON HOLD | OUTPATIENT
Start: 2025-05-03 | End: 2025-05-12

## 2025-05-03 RX ORDER — INSULIN GLARGINE 100 [IU]/ML
20 INJECTION, SOLUTION SUBCUTANEOUS 2 TIMES DAILY
Status: ON HOLD | DISCHARGE
Start: 2025-05-03 | End: 2025-05-09

## 2025-05-03 RX ORDER — LEVETIRACETAM 750 MG/1
1500 TABLET ORAL 2 TIMES DAILY
Status: ON HOLD | DISCHARGE
Start: 2025-05-03 | End: 2025-05-09

## 2025-05-03 RX ADMIN — LEVETIRACETAM 1500 MG: 750 TABLET, FILM COATED ORAL at 20:17

## 2025-05-03 RX ADMIN — DEXAMETHASONE 2 MG: 2 TABLET ORAL at 10:33

## 2025-05-03 RX ADMIN — LEVOTHYROXINE SODIUM 125 MCG: 125 TABLET ORAL at 10:32

## 2025-05-03 RX ADMIN — ATORVASTATIN CALCIUM 40 MG: 40 TABLET, FILM COATED ORAL at 10:32

## 2025-05-03 RX ADMIN — OXYCODONE HYDROCHLORIDE 5 MG: 5 TABLET ORAL at 20:17

## 2025-05-03 RX ADMIN — ISOSORBIDE MONONITRATE 30 MG: 30 TABLET, EXTENDED RELEASE ORAL at 10:33

## 2025-05-03 RX ADMIN — LEVETIRACETAM 1500 MG: 750 TABLET, FILM COATED ORAL at 10:40

## 2025-05-03 ASSESSMENT — ACTIVITIES OF DAILY LIVING (ADL)
ADLS_ACUITY_SCORE: 52
ADLS_ACUITY_SCORE: 51
ADLS_ACUITY_SCORE: 51
ADLS_ACUITY_SCORE: 52
ADLS_ACUITY_SCORE: 51
ADLS_ACUITY_SCORE: 52
ADLS_ACUITY_SCORE: 51
ADLS_ACUITY_SCORE: 52
ADLS_ACUITY_SCORE: 51
ADLS_ACUITY_SCORE: 52
ADLS_ACUITY_SCORE: 51
ADLS_ACUITY_SCORE: 52

## 2025-05-03 NOTE — PLAN OF CARE
Reason for Admission: POD 11 for frontotemporal crani, metastatic small cell carcinoma.     Cognitive/Mentation: A/O x4.   Neuros/CMS: Intact ex slight L droop, [place] (bilateral hearing aids in ).  VS: Stable on RA. SBP <150.   Tele: Afib CVR.   GI/: Continent. Last BM 5/2, loose- senna not given.   Pulmonary: LS clear.   Pain: Mild headache and residual chest pain from code blue. PRN oxy given and abdominal binder in place.     Drains/Lines: PIV SL.   Skin: Scattered bruising, R eye bruise, and scalp crani/incision TYRELL.   Activity: Assist x1 with GBW.   Diet: Easy to chew with thin liquids. Takes pills whole with water.     Therapies recs: ARU.   Discharge: Family will transport to [facility] ARU on 5/4 at noon.     Aggression Spotlight Tool: yellow for impulsiveness and previous falls.     End of shift summary: Fair sleep in between cares.

## 2025-05-03 NOTE — PROGRESS NOTES
Speech Language Therapy Discharge Summary    Reason for therapy discharge:    All goals and outcomes met, no further needs identified.    Progress towards therapy goal(s). See goals on Care Plan in [facility] electronic health record for goal details.  Goals met    Therapy recommendation(s):    No further therapy is recommended.    Recommed pt continue a regular diet and thin liquids with close supervision. Pt should be up in a chair for meals, practice a slow pace, take small bites/sips, and alternate liquids/solids every few bites. Ok for straws. Hold diet if overt s/sx of aspiration occur or changes in respiratory status. Pt educated on holding PO with s/sx of aspiration as well as safe swallow strategies. Pt verbalized understanding.

## 2025-05-03 NOTE — PROGRESS NOTES
"Neurosurgery Progress     Dx:     POD #11 s/p right frontotemporal craniotomy.    Pathology revealed metastatic small cell carcinoma.    Neuro stable.     TODAY'S PLAN:     -Neuro checks Q4H.  -Pain control measures as needed.  -Plt goal > 75k.  -Decadron 2 week taper to off, ordered.  -Keppra 1500 mg BID per Neurology.  -Wound care: May leave incision open to air, cover with light gauze if patient is picking at incision.  -May resume Eliquis for afib POD#14.  -Speech therapy cleared patient for regular diet.  -Hospitalist following.  [facility]3 [facility] pending discharge from the hospital / rehab with initiation of treatment approximately 1 week later.   -Dispo: ARU per therapies, pending placement.   -ARU on 5/4 @ 12pm. Family to transport.      In the event that patient's symptoms worsen or change we would appreciate being contacted. We did discuss signs of a worsening problem that he should seek being evaluated.     We did review the above information with the patient whom agrees with the plan and did verbalize understanding.   ________________________________________________________________     Mr. Perea overall feels well and denies any significant discomfort. Tolerating regular diet without n/v. Up ambulating with assist.     /44 (BP Location: Left arm)   Pulse 59   Temp 98  F (36.7  C) (Oral)   Resp 16   Ht 1.803 m (5' 11\")   Wt 83.4 kg (183 lb 13.8 oz)   SpO2 98%   BMI 25.64 kg/m       Pt walking the halls. Appears comfortable and in no apparent distress, moving all extremities.   CN II-XII intact, alert and appropriate with conversation and following commands.   Able to name 3/3 objects.   Finger to nose slow and accurate.   Rapid hand movements intact.   Absent for upper and lower extremity drift.   Bilateral upper and lower extremities 5/5. DTR's wnl. Sensation intact throughout. Normal ROM.   Calves soft and non-tender.   Incision looks good. "     All pertinent labs and updated imaging reviewed in EPIC.     Jose Antonio Sky PA-C   Neurosurgery     Discussed with Dr. Vincent.

## 2025-05-03 NOTE — PROGRESS NOTES
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 Admitted on 4/16/2025.      Brain mass with hemorrhage, vasogenic edema   Encephalopathy due to brain mass/brain edema  High grade neuroendocrine tumor, large cell type, left parotid gland s/p chemoradiation   *Presents with increased lethargy, somnolence, word-finding and memory difficulty, generalized weakness in legs   *CT head with 5.3 x 4 x 3.8 cm right temporal lobe mass with associated hemorrhagic aspect, significant surrounding vasogenic edema, right to left midline shift 6.6 mm  *MRI brain with 5.3 x 3.8 x 4.3 cm right temporal lobe mass with extensive surrounding vasogenic edema, hemorrhagic signal within the mass, right to left midline shift 4 to 5 mm  *Hx of neuroendocrine tumor as above, follows with [facility] (Dr. Butler), felt to be in remission  Neurosurgery consult requested.  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  Please see cancer treatment history outlined by Dr. Bravo in his note from 4/23.  Essentially, he notes that after a patient received a course of definitive chemoradiation therapy for left parotid small cell carcinoma a PET CT on 12/23/2024 showed complete metabolic response with no evidence of residual disease.  Per Dr. Bravo on 4/30, \"Given his limited disease extent and improved clinical status, I discussed recommendations for post-operative stereotactic radiation therapy to the surgical cavity and residual disease to 3000 cGy in 5 fractions.   We will plan for brain MRI and CT simulation in 2-3 weeks pending " "his discharge from the hospital and rehab with initiation of treatment approximately 1 week later.\"  I appreciate his followup.  Neurochecks, steroids, wound cares, pain control per neurosurgery  AEDs per general neurology, now on Keppra 1500 mg twice daily  Goal SBP <150, PRN IV labetalol, hydralazine ordered    Moderate oral and pharyngeal dysphagia  Now on IDDSI level 5 (minced/moist) diet and thin liquids  Continue swallowing treatments with SLP  4/28: Discussed with nutrition.  Patient is eating well, ate 100% of his tray and is asking for more food.  Taper and discontinue tube feedings, leave enteral tube in place for now.  4/29: Patient is once again eating 100% of meals.  Removed feeding tube.  5/1: Per SLP, \"patient's swallow function is improving but remains below his baseline.  Recommended advance to regular solids.\"    Falls  Back pain  RRT was called x 2 on 4/28 for falls, not resulting in significant injury.  The first episode was unwitnessed, patient reported that his left knee gave way and he \"sat down.\"  The second episode was assisted, patient was lowered back onto his bed.  Fall precautions were added  Continue PT, OT  up with assist only, including use of gait belt and walker  Briefly had bedside attendant, discontinued 4/29  PT, patient complained of back pain.  Neurosurgery ordered x-ray of lumbar spine which shows osteopenia, is otherwise unremarkable.  5/3:  patient says back pain continues to improve    Coronary artery disease status post CABG  LVH  Mild troponin elevation  Postoperative hypotension, CODE BLUE activated  Troponin 42->45. Denies chest pain. EKG reviewed by cardiology initially due to ST elevations in setting of repolarization abnormality, not felt to be ischemic. Likely secondary to brain mass/bleed.   See postoperative note from Dr. Erickson, patient had \"hypotensive episode following extubation in the PACU to the point that he required activation of CODE BLUE.  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 Creatinine 1.22 on admission.   Currently around baseline  Avoid nephrotoxins  Monitor BMP      Diabetes mellitus, type 2, on long term insulin   HgbA1c 6.9% 2/10/25. Prior to admission on glargine 40 units BID, aspart 3 units per carb unit.  4/28:Remains on insulin gtt, 6 units/hr  We discussed diabetes management.  He says his diabetes was diagnosed 35 years ago, he checks his own blood sugars and recognizes symptoms of hyper and hypoglycemia.  He was able to describe what he would do if his blood sugar was low.  He recalls his prior to admission dose of basal insulin.  4/28:  Discontinue insulin drip, resume PTA basal insulin, Lantus 40 units twice daily and PTA prandial insulin at slightly lower dose, 1 unit per 15 g of carbohydrate with high scale corrective dose insulin  Hypoglycemia protocol  5/1: Morning blood sugar reading was 57 mg/dL.  Patient ate all of his breakfast, follow-up blood sugar 163.  Decreased from high correction scale to medium correction scale, decreased Lantus from 40 to 35 units twice daily.  5/2:  morning blood sugar reading was 62 mg/dl.  Patient again says he does not have symptoms with hypoglycemia but that his wearable monitor (freestyle harry) alerts him with low blood sugar readings when he is at home.  Reduced Lantus to 30 units twice daily, reduced prandial NovoLog to 1 unit per 20 g of carbohydrate  5/3: Hypoglycemia persists, dexamethasone is being tapered.  Discontinue prandial NovoLog, reduce Lantus to 20 units " "twice daily, continue medium scale corrective dose insulin     Hyperlipidemia  Continue PTA atorvastatin, fenofibrate      Hypothyroidism  Continue PTA levothyroxine           Diet: Room Service  Snacks/Supplements Adult: Other; offer supplement with meals; With Meals  Regular Diet Adult (supervision due to impulsivenss, slow rate, small bites/sips, alternate liquids/solids. Hold diet if increased coughing or changes in his respiratory status.)  Diet    DVT Prophylaxis: Pneumatic Compression Devices  Duran Catheter: Not present  Lines: None     Cardiac Monitoring: None  Code Status: Full Code      Clinically Significant Risk Factors               # Hypoalbuminemia: Lowest albumin = 3.3 g/dL at 4/23/2025  5:31 PM, will monitor as appropriate   # Thrombocytopenia: Lowest platelets = 78 in last 2 days, will monitor for bleeding   # Hypertension: Noted on problem list           # DMII: A1C = 7.9 % (Ref range: <5.7 %) within past 6 months   # Overweight: Estimated body mass index is 25.64 kg/m  as calculated from the following:    Height as of this encounter: 1.803 m (5' 11\").    Weight as of this encounter: 83.4 kg (183 lb 13.8 oz).        # Financial/Environmental Concerns:           Social Drivers of Health           Disposition Plan     Medically Ready for Discharge: Medically ready now       Hilaria Ly MD  Hospitalist Service  [facility]  Securely message with [employer] (more info)  Text page via [employer] Paging/Directory   ______________________________________________________________________    Interval History   \"What more could I eat?\"  Patient says he has been eating well, he had full dinner and dessert last night, morning blood sugar reading was still low.  He still feels reluctant to go to acute rehab and asked the physical therapist if he could go home.  He has no respiratory or GI complaints.    Physical Exam   Vital Signs: Temp: 97.9  F (36.6  C) Temp src: Oral BP: 96/48 Pulse: 54 "   Resp: 16 SpO2: 95 % O2 Device: None (Room air)    Weight: 183 lbs 13.82 oz    Constitutional: Awake, alert.  Initially seen while walking with physical therapy  HEENT:  long incision right frontotemporal scalp without redness or drainage  Respiratory: Clear to auscultation bilaterally, no crackles or wheezing  Cardiovascular: bradycardic, with murmur in systole  GI: Normal bowel sounds, soft, non-distended, non-tender  Skin/Integumen: No rash on exposed skin.  No lower extremity edema  Other: Mood is pleasant and outgoing    Medical Decision Making       45 MINUTES SPENT BY ME on the date of service doing chart review, history, exam, documentation & further activities per the note.   Including discussion with patient and PT    Data     I have personally reviewed the following data over the past 24 hrs:    4.6  \   9.3 (L)   / 78 (L)     N/A N/A N/A /  85   N/A N/A N/A \       Imaging results reviewed over the past 24 hrs:   No results found for this or any previous visit (from the past 24 hours).

## 2025-05-03 NOTE — PROGRESS NOTES
The patient  is AO X 4; VSS; on RA; in no acute distress; slept most of the day.   He has been comfortable all day.   The plan is to discharge tomorrow at 12 pm to an ARU.

## 2025-05-03 NOTE — INTERIM SUMMARY
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 CT head with 5.3 x 4 x 3.8 cm right temporal lobe mass with associated hemorrhagic aspect, significant surrounding vasogenic edema, right to left midline shift 6.6 mm. MRI brain with 5.3 x 3.8 x 4.3 cm right temporal lobe mass with extensive surrounding vasogenic edema, hemorrhagic signal within the mass, right to left midline shift 4 to 5 mm. Pt is now s/p right frontotemporal craniotomy for resection of right temporal brain tumor. DOS: 4/22/2025.  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 Post operative course complicated by cardiopulmonary arrest in PACU requiring re-intubation and CPR with ROSC. He also had a seizure in PACU. Pt is now stable and ready to transfer to [facility] to address ongoing medical management and rehabilitation needs.     Patient requires an intensive inpatient rehab program to address the following acute impairments:impaired activity tolerance, impaired balance, impaired judgement and safety awareness, impaired strength, impaired tone, and pain. Pt also continues to demonstrate slight L facial droop and generalized  weakness per nursing notes. At baseline, pt is [place] and use hearing aids. He lives in an apt with his adult son (Harpreet) and was able to mobilized independently using a single point cane. Currently, the pt requires assist x1 for all transfers, Andrés for all gait using a SPC on level surfaces only and has yet to attempt stairs.     Current Active Medical Management Needs/Risks for Clinical Complications  The patient requires the high level of rehabilitation physician supervision that accompanies the provision of intensive rehabilitation therapy.  The patient needs the services of the rehabilitation physician to assess the patient medically and functionally and to modify the course of treatment as needed to maximize the patient's capacity to benefit from the rehabilitation process.  Pain: In setting of low back pain s/p falls (4/28) and surgical pain from recent right frontotemporal craniotomy. DOS: 4/22/2025. Continue abdominal binder for comfort. Continue PRN Oxy. Patient will need ongoing assessment and adjustment of pain medications for optimal participation in therapies. Opioid use places pt at increased risk for constipation, dizziness and fatigue.   Neurology: In setting of brain mass with hemorrhage. 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 Pt continues to demonstrate slight left facial droop. Scalp crani/incision TYRELL. No oncology treatment until after rehab stay with notes indicating plan for brain MRI and CT simulation in 2-3 weeks pending his discharge from the hospital and rehab with initiation of treatment approximately 1 week later. Continue neuro checks Q4. Decadron 2 week taper to off, ordered. Keppra 1500 mg BID. May resume Eliquis for afib POD#14. Will need ongoing assessment pt at increased risk for worsening encephalopathy and fluctuations in neuro status.   Cardiology: In setting of CAD s/p CABG, LVH, post operative hypotension with code blue activation and afib. Patient had hypotensive  "episode following extubation in the PACU to the point that he required activation of CODE BLUE. Patient also required CPR. Troponin 42->45. Denies chest pain. EKG reviewed by cardiology initially due to ST elevations in setting of repolarization abnormality, not felt to be ischemic. Likely secondary to brain mass/bleed. Continue PTA Imdur. Per neurosurgery, may resume apixaban for A-fib on POD #14 (5/6/25). Will need ongoing assessment. Pt at increased risk for irregular heart rate, BP fluctuations, fatigue and altered activity tolerance.   Endocrine: In setting of T2DM. HgbA1c 6.9% 2/10/25. Prior to admission on glargine 40 units BID, aspart 3 units per carb unit. Has had mutiple episodes of hypoglycemia while hospitalized and remained asymptomatic. Dexamethasone is being tapered. Discontinue prandial NovoLog, reduce Lantus to 20 units twice daily, continue medium scale corrective dose insulin. Will need ongoing assessment as pt at increased risk for BG fluctuations. T2DM places pt at increased risk for impaired wound healing.   Nutrition: In setting of dysphagia. Pt initiated on a minced and moist diet with thin liquids on 4/28. He recently advanced to a regular diet with thin liquids on 5/1 with SLP signing off on 5/3. Continue to provide education regarding safe swallow strategies. Alternate liquids/solids every few bites, use slow pacing and take small bites/sips. Will need ongoing assessment as pt at increased risk for aspiration.   Falls:  RRT was called x 2 on 4/28 for falls, not resulting in significant injury. The first episode was unwitnessed, patient reported that his left knee gave way and he \"sat down.\" The second episode was assisted, patient was lowered back onto his bed. Pt c/o back pain following falls; abdominal binder for comfort. Neurosurgery ordered x-ray of lumbar spine which shows osteopenia, is otherwise unremarkable. Continue falls precautions and encourage use of call light. Will need " ongoing assessment as at increased risk for repeat falls.   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 Uses SPC for all mobility at baseline. Harpreet reports patient was getting around the apartment with a walker or cane, harpreet and his sister did most of the cooking, cleaning and shopping.     Additional Comments: n/a    Level of Function: GG Scale (Section GG Functional Ability and Goals; CMS's MERINO Version 3.0 Manual effective 10.1.2019):  PT Current Function Goals for Rehab   Bed Rolling 4 Supervision or touching assistance 6 Independent   Supine to Sit 4 Supervision or touching assistance 6 Independent   Sit to Stand 4 Supervision or touching assistance 6 Independent   Transfer 4 Supervision or touching assistance 6 Independent   Ambulation 3 Partial/moderate assistance 6 Independent   Stairs Not completed 6 Independent     OT Current Function Goals for Rehab   Feeding 5 Setup or clean-up assistance 6 Independent   Grooming Not completed 6 Independent   Bathing Not completed 6 Independent   Upper Body Dressing 4 Supervision or touching assistance 6 Independent   Lower Body Dressing 4 Supervision or touching assistance 6 Independent   Toileting 3  Partial/moderate assistance 6 Independent   Toilet Transfer 4 Supervision or touching assistance 6 Independent   Tub/Shower Transfer Not completed 6 Independent   Cognition Not Assessed Supervision     SLP Current Function Goals for Rehab   Swallow Impaired Not applicable   Communication Not Impaired Not applicable       Current Diet:  0-Thin and 7-Regular    Summary Statement:  Pt with ongoing PT and OT needs. Pt's goal is to be able to attend his grandson's wedding at the end of the month. Of note, he did have multiple falls on 4/28-4/29. Stairs and formal balance testing has yet to be completed.     Pt continues to use an abdominal binder with mobility for comfort and to ease back pain. Pt is able to complete sit<>stand transfers from slightly elevated bed height with supervision assist. Pt is able to ambulate over level surfaces with SPC, however requires Andrés throughout ambulation due to intermittent & unexpected LOB episodes. Attempted to pick an object up off the floor simulating what he would do at home. Pt unable to complete this task from standing, needing to sit on edge of bed in order to retrieve an object from the floor.     CGA for safety needed for pt to complete sit<>stand transfer from standard toilet height and grab bar assist. Shower and shower transfer have yet to be completed. Pt needing set up and supervision assist with LB dressing from a seated position utilizing fig 4 technique.    In regards to dysphagia management, pt initiated on a minced and moist diet with thin liquids on 4/28. He advanced to a regular diet with thin liquids on 5/1 and SLP signed off on 5/3 as he continues to tolerate a regular diet and thin liquids which was his baseline.     Expected Therapies and Services Required During Inpatient Rehab Admission  Intensity of Therapy: Patient requires intensive therapies not available in a lesser level of care. Patient is motivated, making gains, and can tolerate 3 hours of therapy  a day.  Physical Therapy: 90 minutes per day, 6 days a week for 7 days  Occupational Therapy: 90 minutes per day, 6 days a week for 7 days  Speech and Language Therapy: No anticipated SLP needs.   Rehabilitation Nursing Needs: Patient requires 24 hour Rehab Nursing to manage vitals, tone management, positioning, carryover of new rehab techniques, skin integrity, assess neurologic status, pain management, post-surgical incision care to promote healing and prevent infection, provide safe environment for patient at falls risk, and monitor nutritional intake.    Precautions/restrictions/special needs:   Precautions: fall precautions and Craniotomy precautions   Restrictions: n/a   Special Needs:  n/a    Expected Level of Improvement: Mod I with all mobility, transfers, ADL's and IADL's.   Expected Length of time to achieve: 7 days    Anticipated Discharge Needs:  Anticipated Discharge Destination: Home  Anticipated Discharge Support: Family member  24/7 support available : Unknown  Identified caregiver(s):  son  Anticipated Discharge Needs: Home with homecare and Home with outpatient therapy; [facility] recommending brain MRI and CT simulation in 2-3 weeks pending discharge from the hospital / rehab with initiation of treatment approximately 1 week later.    Identified challenges/barriers:  N/a    Liaison signature/date/time: Zunilda Sultana, PT 5/4/2025 08:51 AM    Physician statement of review and agreement:  I have reviewed and am in agreement of the need for IRF stay to address above functional and medical needs. In addition to above statements address, Patient requires intensive active and ongoing therapeutic intervention and multiple therapies; Patient requires medical supervision; Expected to actively participate in the intensive rehab program; Sufficiently stable to actively participate; Expectation for measurable improvement in functional capacity or adaption to impairments.    MD signature/date/time:

## 2025-05-04 ENCOUNTER — HOSPITAL ENCOUNTER (INPATIENT)
Facility: CLINIC | Age: 85
DRG: 843 | End: 2025-05-04
Attending: PHYSICAL MEDICINE & REHABILITATION | Admitting: PHYSICAL MEDICINE & REHABILITATION
Payer: COMMERCIAL

## 2025-05-04 VITALS
HEART RATE: 54 BPM | TEMPERATURE: 97.5 F | RESPIRATION RATE: 16 BRPM | HEIGHT: 71 IN | DIASTOLIC BLOOD PRESSURE: 75 MMHG | OXYGEN SATURATION: 100 % | BODY MASS INDEX: 25.74 KG/M2 | SYSTOLIC BLOOD PRESSURE: 125 MMHG | WEIGHT: 183.86 LBS

## 2025-05-04 DIAGNOSIS — I25.10 CORONARY ARTERY DISEASE INVOLVING NATIVE CORONARY ARTERY OF NATIVE HEART WITHOUT ANGINA PECTORIS: ICD-10-CM

## 2025-05-04 DIAGNOSIS — N18.31 STAGE 3A CHRONIC KIDNEY DISEASE (H): ICD-10-CM

## 2025-05-04 DIAGNOSIS — E55.9 VITAMIN D DEFICIENCY: ICD-10-CM

## 2025-05-04 DIAGNOSIS — D51.3 OTHER DIETARY VITAMIN B12 DEFICIENCY ANEMIA: ICD-10-CM

## 2025-05-04 DIAGNOSIS — K21.9 GASTROESOPHAGEAL REFLUX DISEASE WITHOUT ESOPHAGITIS: ICD-10-CM

## 2025-05-04 DIAGNOSIS — E78.5 HYPERLIPIDEMIA LDL GOAL <70: ICD-10-CM

## 2025-05-04 DIAGNOSIS — E03.9 HYPOTHYROIDISM, UNSPECIFIED TYPE: ICD-10-CM

## 2025-05-04 DIAGNOSIS — I10 ESSENTIAL HYPERTENSION, BENIGN: ICD-10-CM

## 2025-05-04 DIAGNOSIS — E11.22 TYPE 2 DIABETES MELLITUS WITH STAGE 3A CHRONIC KIDNEY DISEASE, WITH LONG-TERM CURRENT USE OF INSULIN (H): ICD-10-CM

## 2025-05-04 DIAGNOSIS — G93.89 BRAIN MASS: ICD-10-CM

## 2025-05-04 DIAGNOSIS — G47.33 OSA (OBSTRUCTIVE SLEEP APNEA): ICD-10-CM

## 2025-05-04 DIAGNOSIS — K31.84 GASTROPARESIS: ICD-10-CM

## 2025-05-04 DIAGNOSIS — R41.82 ALTERED MENTAL STATUS, UNSPECIFIED ALTERED MENTAL STATUS TYPE: ICD-10-CM

## 2025-05-04 DIAGNOSIS — N18.31 TYPE 2 DIABETES MELLITUS WITH STAGE 3A CHRONIC KIDNEY DISEASE, WITH LONG-TERM CURRENT USE OF INSULIN (H): ICD-10-CM

## 2025-05-04 DIAGNOSIS — Z98.890 S/P CRANIOTOMY: Primary | ICD-10-CM

## 2025-05-04 DIAGNOSIS — Z79.4 TYPE 2 DIABETES MELLITUS WITH STAGE 3A CHRONIC KIDNEY DISEASE, WITH LONG-TERM CURRENT USE OF INSULIN (H): ICD-10-CM

## 2025-05-04 DIAGNOSIS — I48.91 ATRIAL FIBRILLATION, UNSPECIFIED TYPE (H): ICD-10-CM

## 2025-05-04 DIAGNOSIS — D69.6 THROMBOCYTOPENIA: ICD-10-CM

## 2025-05-04 LAB
GLUCOSE BLDC GLUCOMTR-MCNC: 101 MG/DL (ref 70–99)
GLUCOSE BLDC GLUCOMTR-MCNC: 110 MG/DL (ref 70–99)
GLUCOSE BLDC GLUCOMTR-MCNC: 152 MG/DL (ref 70–99)
GLUCOSE BLDC GLUCOMTR-MCNC: 159 MG/DL (ref 70–99)
GLUCOSE BLDC GLUCOMTR-MCNC: 247 MG/DL (ref 70–99)
GLUCOSE BLDC GLUCOMTR-MCNC: 53 MG/DL (ref 70–99)
GLUCOSE BLDC GLUCOMTR-MCNC: 69 MG/DL (ref 70–99)
MAGNESIUM SERPL-MCNC: 2 MG/DL (ref 1.7–2.3)
PHOSPHATE SERPL-MCNC: 3.5 MG/DL (ref 2.5–4.5)

## 2025-05-04 PROCEDURE — 250N000013 HC RX MED GY IP 250 OP 250 PS 637

## 2025-05-04 PROCEDURE — 36415 COLL VENOUS BLD VENIPUNCTURE: CPT | Performed by: STUDENT IN AN ORGANIZED HEALTH CARE EDUCATION/TRAINING PROGRAM

## 2025-05-04 PROCEDURE — 84100 ASSAY OF PHOSPHORUS: CPT | Performed by: STUDENT IN AN ORGANIZED HEALTH CARE EDUCATION/TRAINING PROGRAM

## 2025-05-04 PROCEDURE — 250N000013 HC RX MED GY IP 250 OP 250 PS 637: Performed by: PHYSICAL MEDICINE & REHABILITATION

## 2025-05-04 PROCEDURE — 99232 SBSQ HOSP IP/OBS MODERATE 35: CPT | Performed by: INTERNAL MEDICINE

## 2025-05-04 PROCEDURE — 250N000013 HC RX MED GY IP 250 OP 250 PS 637: Performed by: INTERNAL MEDICINE

## 2025-05-04 PROCEDURE — 250N000013 HC RX MED GY IP 250 OP 250 PS 637: Performed by: NURSE PRACTITIONER

## 2025-05-04 PROCEDURE — 250N000013 HC RX MED GY IP 250 OP 250 PS 637: Performed by: STUDENT IN AN ORGANIZED HEALTH CARE EDUCATION/TRAINING PROGRAM

## 2025-05-04 PROCEDURE — 83735 ASSAY OF MAGNESIUM: CPT | Performed by: STUDENT IN AN ORGANIZED HEALTH CARE EDUCATION/TRAINING PROGRAM

## 2025-05-04 PROCEDURE — 99222 1ST HOSP IP/OBS MODERATE 55: CPT | Mod: AI | Performed by: PHYSICAL MEDICINE & REHABILITATION

## 2025-05-04 PROCEDURE — 128N000003 HC R&B REHAB

## 2025-05-04 PROCEDURE — 250N000012 HC RX MED GY IP 250 OP 636 PS 637: Performed by: NEUROLOGICAL SURGERY

## 2025-05-04 RX ORDER — MENTHOL 5 G/1
1 PATCH TOPICAL AT BEDTIME
Status: DISCONTINUED | OUTPATIENT
Start: 2025-05-04 | End: 2025-05-04

## 2025-05-04 RX ORDER — CARBOXYMETHYLCELLULOSE SODIUM 5 MG/ML
1 SOLUTION/ DROPS OPHTHALMIC DAILY
Status: DISCONTINUED | OUTPATIENT
Start: 2025-05-04 | End: 2025-05-04 | Stop reason: HOSPADM

## 2025-05-04 RX ORDER — AZELASTINE 1 MG/ML
2 SPRAY, METERED NASAL 2 TIMES DAILY
Status: DISPENSED | OUTPATIENT
Start: 2025-05-04

## 2025-05-04 RX ORDER — AMOXICILLIN 250 MG
1 CAPSULE ORAL DAILY
Status: DISPENSED | OUTPATIENT
Start: 2025-05-05

## 2025-05-04 RX ORDER — LORAZEPAM 2 MG/ML
2 INJECTION INTRAMUSCULAR EVERY 5 MIN PRN
Status: ACTIVE | OUTPATIENT
Start: 2025-05-04

## 2025-05-04 RX ORDER — LANOLIN ALCOHOL/MO/W.PET/CERES
1000 CREAM (GRAM) TOPICAL DAILY
Status: DISCONTINUED | OUTPATIENT
Start: 2025-05-04 | End: 2025-05-04 | Stop reason: HOSPADM

## 2025-05-04 RX ORDER — DEXAMETHASONE 1 MG
1 TABLET ORAL DAILY
Status: COMPLETED | OUTPATIENT
Start: 2025-05-05 | End: 2025-05-06

## 2025-05-04 RX ORDER — LEVOTHYROXINE SODIUM 125 UG/1
125 TABLET ORAL DAILY
Status: DISPENSED | OUTPATIENT
Start: 2025-05-05

## 2025-05-04 RX ORDER — NALOXONE HYDROCHLORIDE 0.4 MG/ML
0.4 INJECTION, SOLUTION INTRAMUSCULAR; INTRAVENOUS; SUBCUTANEOUS
Status: ACTIVE | OUTPATIENT
Start: 2025-05-04

## 2025-05-04 RX ORDER — CARBOXYMETHYLCELLULOSE SODIUM 5 MG/ML
1 SOLUTION/ DROPS OPHTHALMIC 2 TIMES DAILY
Status: DISPENSED | OUTPATIENT
Start: 2025-05-04

## 2025-05-04 RX ORDER — DEXTROSE MONOHYDRATE 25 G/50ML
25-50 INJECTION, SOLUTION INTRAVENOUS
Status: ACTIVE | OUTPATIENT
Start: 2025-05-04

## 2025-05-04 RX ORDER — OXYCODONE HYDROCHLORIDE 5 MG/1
5 TABLET ORAL EVERY 6 HOURS PRN
Status: DISCONTINUED | OUTPATIENT
Start: 2025-05-04 | End: 2025-05-04

## 2025-05-04 RX ORDER — FENOFIBRATE 67 MG/1
67 CAPSULE ORAL
Status: DISCONTINUED | OUTPATIENT
Start: 2025-05-05 | End: 2025-05-04

## 2025-05-04 RX ORDER — SALIVA STIMULANT COMB. NO.3
1 SPRAY, NON-AEROSOL (ML) MUCOUS MEMBRANE AT BEDTIME
Status: DISPENSED | OUTPATIENT
Start: 2025-05-04

## 2025-05-04 RX ORDER — ISOSORBIDE MONONITRATE 30 MG/1
30 TABLET, EXTENDED RELEASE ORAL DAILY
Status: DISPENSED | OUTPATIENT
Start: 2025-05-05

## 2025-05-04 RX ORDER — LEVETIRACETAM 750 MG/1
1500 TABLET ORAL 2 TIMES DAILY
Status: DISPENSED | OUTPATIENT
Start: 2025-05-04

## 2025-05-04 RX ORDER — ATORVASTATIN CALCIUM 40 MG/1
40 TABLET, FILM COATED ORAL DAILY
Status: DISPENSED | OUTPATIENT
Start: 2025-05-05

## 2025-05-04 RX ORDER — LANOLIN ALCOHOL/MO/W.PET/CERES
1000 CREAM (GRAM) TOPICAL DAILY
Status: DISPENSED | OUTPATIENT
Start: 2025-05-05

## 2025-05-04 RX ORDER — FENOFIBRATE 54 MG/1
54 TABLET ORAL
Status: DISPENSED | OUTPATIENT
Start: 2025-05-05

## 2025-05-04 RX ORDER — ONDANSETRON 4 MG/1
4 TABLET, ORALLY DISINTEGRATING ORAL EVERY 6 HOURS PRN
Status: ACTIVE | OUTPATIENT
Start: 2025-05-04

## 2025-05-04 RX ORDER — AMOXICILLIN 250 MG
1 CAPSULE ORAL 2 TIMES DAILY
Status: DISCONTINUED | OUTPATIENT
Start: 2025-05-04 | End: 2025-05-04

## 2025-05-04 RX ORDER — BISACODYL 10 MG
10 SUPPOSITORY, RECTAL RECTAL DAILY PRN
Status: ACTIVE | OUTPATIENT
Start: 2025-05-04

## 2025-05-04 RX ORDER — NALOXONE HYDROCHLORIDE 0.4 MG/ML
0.2 INJECTION, SOLUTION INTRAMUSCULAR; INTRAVENOUS; SUBCUTANEOUS
Status: ACTIVE | OUTPATIENT
Start: 2025-05-04

## 2025-05-04 RX ORDER — POLYETHYLENE GLYCOL 3350 17 G/17G
17 POWDER, FOR SOLUTION ORAL DAILY
Status: DISPENSED | OUTPATIENT
Start: 2025-05-05

## 2025-05-04 RX ORDER — OXYCODONE HYDROCHLORIDE 5 MG/1
5 TABLET ORAL EVERY 6 HOURS PRN
Status: ACTIVE | OUTPATIENT
Start: 2025-05-04

## 2025-05-04 RX ORDER — OXYCODONE HYDROCHLORIDE 5 MG/1
10 TABLET ORAL EVERY 6 HOURS PRN
Status: ACTIVE | OUTPATIENT
Start: 2025-05-04

## 2025-05-04 RX ORDER — HYDRALAZINE HYDROCHLORIDE 10 MG/1
10 TABLET, FILM COATED ORAL EVERY 4 HOURS PRN
Status: ACTIVE | OUTPATIENT
Start: 2025-05-04

## 2025-05-04 RX ORDER — OXYCODONE HYDROCHLORIDE 5 MG/1
10 TABLET ORAL EVERY 4 HOURS PRN
Status: DISCONTINUED | OUTPATIENT
Start: 2025-05-04 | End: 2025-05-04

## 2025-05-04 RX ORDER — FENOFIBRATE 54 MG/1
54 TABLET ORAL
Status: DISCONTINUED | OUTPATIENT
Start: 2025-05-04 | End: 2025-05-04 | Stop reason: HOSPADM

## 2025-05-04 RX ORDER — ACETAMINOPHEN 325 MG/1
650 TABLET ORAL EVERY 4 HOURS PRN
Status: DISPENSED | OUTPATIENT
Start: 2025-05-04

## 2025-05-04 RX ORDER — NICOTINE POLACRILEX 4 MG
15-30 LOZENGE BUCCAL
Status: ACTIVE | OUTPATIENT
Start: 2025-05-04

## 2025-05-04 RX ORDER — AZELASTINE HYDROCHLORIDE 137 UG/1
2 SPRAY, METERED NASAL 2 TIMES DAILY
Status: DISCONTINUED | OUTPATIENT
Start: 2025-05-04 | End: 2025-05-04

## 2025-05-04 RX ORDER — AZELASTINE 1 MG/ML
2 SPRAY, METERED NASAL 2 TIMES DAILY
Status: DISCONTINUED | OUTPATIENT
Start: 2025-05-04 | End: 2025-05-04 | Stop reason: HOSPADM

## 2025-05-04 RX ADMIN — POLYETHYLENE GLYCOL 3350 17 G: 17 POWDER, FOR SOLUTION ORAL at 10:00

## 2025-05-04 RX ADMIN — LEVOTHYROXINE SODIUM 125 MCG: 125 TABLET ORAL at 10:00

## 2025-05-04 RX ADMIN — AZELASTINE 2 SPRAY: 1 SPRAY, METERED NASAL at 21:06

## 2025-05-04 RX ADMIN — SENNOSIDES AND DOCUSATE SODIUM 1 TABLET: 50; 8.6 TABLET ORAL at 10:00

## 2025-05-04 RX ADMIN — LEVETIRACETAM 1500 MG: 750 TABLET, FILM COATED ORAL at 09:09

## 2025-05-04 RX ADMIN — Medication 1 SPRAY: at 21:06

## 2025-05-04 RX ADMIN — ATORVASTATIN CALCIUM 40 MG: 40 TABLET, FILM COATED ORAL at 09:08

## 2025-05-04 RX ADMIN — DEXAMETHASONE 1 MG: 1 TABLET ORAL at 09:08

## 2025-05-04 RX ADMIN — CARBOXYMETHYLCELLULOSE SODIUM 1 DROP: 5 SOLUTION/ DROPS OPHTHALMIC at 09:09

## 2025-05-04 RX ADMIN — CYANOCOBALAMIN TAB 1000 MCG 1000 MCG: 1000 TAB at 09:08

## 2025-05-04 RX ADMIN — FENOFIBRATE 54 MG: 54 TABLET ORAL at 09:08

## 2025-05-04 RX ADMIN — ACETAMINOPHEN 650 MG: 325 TABLET ORAL at 17:36

## 2025-05-04 RX ADMIN — LEVETIRACETAM 1500 MG: 750 TABLET, FILM COATED ORAL at 21:02

## 2025-05-04 RX ADMIN — ISOSORBIDE MONONITRATE 30 MG: 30 TABLET, EXTENDED RELEASE ORAL at 09:08

## 2025-05-04 RX ADMIN — CARBOXYMETHYLCELLULOSE SODIUM 1 DROP: 5 SOLUTION/ DROPS OPHTHALMIC at 21:02

## 2025-05-04 ASSESSMENT — ACTIVITIES OF DAILY LIVING (ADL)
ADLS_ACUITY_SCORE: 49
PATIENT'S_PREFERRED_MEANS_OF_COMMUNICATION: ENGLISH SPEAKER WITH HEARING LOSS, NO SPEECH PROBLEMS.
ADLS_ACUITY_SCORE: 51
USE_OF_HEARING_ASSISTIVE_DEVICES: BILATERAL HEARING AIDS
EQUIPMENT_CURRENTLY_USED_AT_HOME: CANE, STRAIGHT
WALKING_OR_CLIMBING_STAIRS_DIFFICULTY: YES
DIFFICULTY_COMMUNICATING: NO
NUMBER_OF_TIMES_PATIENT_HAS_FALLEN_WITHIN_LAST_SIX_MONTHS: 2
WEAR_GLASSES_OR_BLIND: YES
ADLS_ACUITY_SCORE: 49
ADLS_ACUITY_SCORE: 51
TOILETING_ISSUES: NO
ADLS_ACUITY_SCORE: 51
ADLS_ACUITY_SCORE: 51
ADLS_ACUITY_SCORE: 49
ADLS_ACUITY_SCORE: 49
THE_FOLLOWING_AIDS_WERE_PROVIDED;: PATIENT DECLINED OFFER OF AUXILIARY AIDS
FALL_HISTORY_WITHIN_LAST_SIX_MONTHS: YES
ADLS_ACUITY_SCORE: 51
HEARING_MANAGEMENT: HEARING AIDES
ADLS_ACUITY_SCORE: 51
ADLS_ACUITY_SCORE: 46
DOING_ERRANDS_INDEPENDENTLY_DIFFICULTY: YES
ADLS_ACUITY_SCORE: 49
WERE_AUXILIARY_AIDS_OFFERED?: NO
ADLS_ACUITY_SCORE: 51
VISION_MANAGEMENT: GLASSES
ADLS_ACUITY_SCORE: 49
DRESSING/BATHING_DIFFICULTY: NO
ADLS_ACUITY_SCORE: 51
DIFFICULTY_EATING/SWALLOWING: NO
ADLS_ACUITY_SCORE: 47
ADLS_ACUITY_SCORE: 51
ADLS_ACUITY_SCORE: 47
ADLS_ACUITY_SCORE: 49
HEARING_DIFFICULTY_OR_DEAF: YES
ADLS_ACUITY_SCORE: 49
ADLS_ACUITY_SCORE: 51
WALKING_OR_CLIMBING_STAIRS: AMBULATION DIFFICULTY, REQUIRES EQUIPMENT;AMBULATION DIFFICULTY, ASSISTANCE 1 PERSON
ADLS_ACUITY_SCORE: 51
DESCRIBE_HEARING_LOSS: BILATERAL HEARING LOSS
CHANGE_IN_FUNCTIONAL_STATUS_SINCE_ONSET_OF_CURRENT_ILLNESS/INJURY: YES
CONCENTRATING,_REMEMBERING_OR_MAKING_DECISIONS_DIFFICULTY: YES

## 2025-05-04 NOTE — PROGRESS NOTES
The patient is AO X 4; VSS; on RA; in no acute distress. He has been sleeping/resting in bed this morning.  He is [place] - hearing aids are at the bedside.   SBA with FWW/GB; continent, comfortable, in no acute distress.  He was hypoglycemic this morning.

## 2025-05-04 NOTE — PLAN OF CARE
Physical Therapy Discharge Summary    Reason for therapy discharge:    Discharged to acute rehabilitation facility.    Progress towards therapy goal(s). See goals on Care Plan in [facility] electronic health record for goal details.  Goals partially met.  Barriers to achieving goals:   discharge from facility.    Therapy recommendation(s):    Continued therapy is recommended.  Rationale/Recommendations:  Patient would benefit from continued skilled PT to address strength and balance deficits and improve functional independence.    Goal Outcome Evaluation:

## 2025-05-04 NOTE — PROGRESS NOTES
Reason for Admission: POD #11 crani      Cognitive/Mentation: A/Ox 4  Neuros/CMS: Intact ex slight left facial droop, [place]  VS: BP<140.   /GI: Continent.   Pulmonary: LS clear.  Pain: denied.   Drains/Lines: SL  Skin: Incision site TYRELL, Scattered bruising  Activity: Assist x 1 with GBW.  Diet: Reg with thin liquid. Takes pills whole with water.      Therapies recs: ARU  Discharge: tomorrow     Aggression Stoplight Tool: green

## 2025-05-04 NOTE — PLAN OF CARE
Goal Outcome Evaluation:    Patient arrived on unit @ 12pm via family ride.VSS on RA. Patient denies pain. Room and unit orientation done with patient and family at bedside. All questions answered at this time.        Orientation:alert and oriented   Bowel: continent   LBM: 5/3 per report   Bladder:continent. 1 PVR done, need 1 more.   Pain: denies pain, per report can report chest discomfort pain from CPR done over a week ago. PRN tylenol and oxycodone available.   Ambulation/Transfers: 1 assist with gait belt and walker   Diet/Liquids: regular diet, thin liquids, pills one at a time with water  Tubes/Lines/Drains: none   Skin: right crani incision site with sutures, bruising to the r.eye and scattered bruising to the right and left forearms  Other: Patient high fall risk, had 2 falls in the hospital. One fall from knees feeling weak and second fall was assisted fall by staff. Please ensure chair and bed alarm are on at all times.

## 2025-05-04 NOTE — PLAN OF CARE
VSS on RA. HR clement in 50's. A&O x4. Hard of hearing. Neuros intact. Generalized weakness. Denies pain. Slept between cares. Right crani site open to air, CDI. Right under eye bruising. Ambulated to bathroom with assist of 1, gait belt and walker. Plan today to ARU at noon.

## 2025-05-04 NOTE — PROGRESS NOTES
Care Management Discharge Note    Discharge Date: 05/04/2025       Discharge Disposition: Acute Rehab    Discharge Services: None    Discharge DME: None    Discharge Transportation: family or friend will provide    Private pay costs discussed: Not applicable    Does the patient's insurance plan have a 3 day qualifying hospital stay waiver?  No    PAS Confirmation Code:    Patient/family educated on Medicare website which has current facility and service quality ratings: no    Education Provided on the Discharge Plan:    Persons Notified of Discharge Plans: Patient, family, HUC, bedside nurse  Patient/Family in Agreement with the Plan: yes    Handoff Referral Completed: No, handoff not indicated or clinically appropriate    Additional Information:  Patient able to discharge to Acute Rehab today. Family will transport around 11am.     JAKE TIMMONS  [facility]  INPATIENT CARE COORDINATION

## 2025-05-04 NOTE — H&P
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 CT head with 5.3 x 4 x 3.8 cm right temporal lobe mass with associated hemorrhagic aspect, significant surrounding vasogenic edema, and right to left midline shift of 6.6 mm, which was later confirmed on MRI. Patient is now s/p right frontotemporal craniotomy for resection of right temporal brain tumor on 4/22/2025.  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 Post operative course complicated by cardiopulmonary arrest in PACU requiring re-intubation and CPR with ROSC along with seizure in PACU. During acute hospitalization, patient was seen and evaluated by PT, OT, and SLP,  who collectively recommended that patient would benefit from ongoing therapies in the acute inpatient rehabilitation setting, and patient was admitted on 5/4/2025.     In review of recent therapy notes, patient currently requires assist x1 for all transfers and has yet to attempt stairs/formal balance testing. Pt is able to complete sit<>stand transfers from slightly elevated bed height with supervision assist. Pt is able to ambulate over level surfaces with SPC, however requires Andrés throughout ambulation due to intermittent & unexpected LOB episodes. Attempted to pick an object up off the floor  simulating what he would do at home. Pt unable to complete this task from standing, needing to sit on edge of bed in order to retrieve an object from the floor. CGA for safety needed for pt to complete sit<>stand transfer from standard toilet height and grab bar assist. Shower and shower transfer have yet to be completed. Pt needing set up and supervision assist with LB dressing from a seated position utilizing fig 4 technique. In regards to dysphagia management, pt initiated on a minced and moist diet with thin liquids on 4/28. He advanced to a regular diet with thin liquids on 5/1 and SLP signed off on 5/3 as he continues to tolerate a regular diet and thin liquids which was his baseline.   PT Current Function Goals for Rehab   Bed Rolling 4 Supervision or touching assistance 6 Independent   Supine to Sit 4 Supervision or touching assistance 6 Independent   Sit to Stand 4 Supervision or touching assistance 6 Independent   Transfer 4 Supervision or touching assistance 6 Independent   Ambulation 3 Partial/moderate assistance 6 Independent   Stairs Not completed 6 Independent      OT Current Function Goals for Rehab   Feeding 5 Setup or clean-up assistance 6 Independent   Grooming Not completed 6 Independent   Bathing Not completed 6 Independent   Upper Body Dressing 4 Supervision or touching assistance 6 Independent   Lower Body Dressing 4 Supervision or touching assistance 6 Independent   Toileting 3 Partial/moderate assistance 6 Independent   Toilet Transfer 4 Supervision or touching assistance 6 Independent   Tub/Shower Transfer Not completed 6 Independent   Cognition Not Assessed Supervision      SLP Current Function Goals for Rehab   Swallow Impaired Not applicable   Communication Not Impaired Not applicable     Upon arrival to the rehab unit, patient was seen at the bedside with family. Patient reports he is getting better and his main concern right now is getting lunch. He notes the generalized lower  "extremity weakness and word-finding/memory difficulties have improved since his tumor resection. He continues to have some issues with balance, which is the main issue he is here for. He notes musculoskeletal chest pain following CPR and is wearing abdominal binder for comfort. Otherwise, largely denies pain, other than \"minor inconvenience\" at surgical site. His grandson's wedding is on 5/24 in [place] and his goal is to get strong enough to go. Patient and family deny other acute concerns or needs.    PAST MEDICAL HISTORY   Reviewed and updated in Epic.  Past Medical History:   Diagnosis Date    Adhesive capsulitis of shoulder     Allergic rhinitis 01/07/2008    Problem list name updated by automated process. Provider to review      Anemia 03/10/2014    Anemia, unspecified     Antiplatelet or antithrombotic long-term use     Arrhythmia     Atrial fib/flutter    Atrial fibrillation, unspecified type (H) 05/02/2018    CAD (coronary artery disease)     Chemotherapy-induced neutropenia 07/19/2024    Coronary artery disease involving native coronary artery of native heart without angina pectoris 02/16/2025    Coronary atherosclerosis     Nuc Stress 4/15/16: On both stress and rest images there is a very small size mild intensity apical photopenic defect which is of equal extent and intensity on both stress and rest images. Gated images demonstrated no regional wall motion abnormalities. The left ventricular ejection fraction was calculated to be 61% with stress.      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    History of cardioversion 04/24/2018    a single synchronized shock of 120 joules restored normal sinus rhythm    History of cardioversion 02/13/2019    single shock , 200 joules successul in restoring NSR    History of colonic polyps " 03/24/2017    Hyperlipidemia LDL goal <70 05/26/2011    Hypertension     Hypothyroidism     Hypothyroidism, unspecified type 08/15/2016    Impotence of organic origin     Laceration of finger 06/2010     left thumb s/p sutures    Numbness and tingling     diabetic neuropathy bilateral feet    BRANDON (obstructive sleep apnea)     intolerant of CPAP, uses it occasionally.  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 disease, with long-term current use of insulin (H) 02/16/2025    Type 2 diabetes mellitus without complication  (goal A1C<8) 10/24/2015    Typical atrial flutter (H) 04/18/2016    Unspecified hypothyroidism     Vitamin D deficiency 09/03/2011       SURGICAL HISTORY  Reviewed and updated in Epic.  Past Surgical History:   Procedure Laterality Date    ANESTHESIA CARDIOVERSION N/A 02/13/2019    Procedure: ANESTHESIA CARDIOVERSION;  Surgeon: GENERIC ANESTHESIA PROVIDER;  Location:  OR    ANESTHESIA CARDIOVERSION N/A 05/22/2019    Procedure: ANESTHESIA, FOR CARDIOVERSION;  Surgeon: GENERIC ANESTHESIA PROVIDER;  Location:  OR    CARDIAC SURGERY      bypass in 1997    CARDIOVERSION  04/24/2018    COLONOSCOPY      CORONARY ANGIOGRAPHY ADULT ORDER      4/2/2012    CORONARY ARTERY BYPASS  1997    [facility] to LAD    ENDOSCOPIC ULTRASOUND UPPER GASTROINTESTINAL TRACT (GI) N/A 03/14/2019    Procedure: ENDOSCOPIC ULTRASOUND OF UPPER GASTROINTESTINAL TRACT;  Surgeon: Ju Torres MD;  Location:  OR    ESOPHAGOSCOPY, GASTROSCOPY, DUODENOSCOPY (EGD), COMBINED N/A 4/15/2022    Procedure:  ESOPHAGOGASTRODUODENOSCOPY (EGD);  Surgeon: Erik Baca DO;  Location:  GI    EXCISE MASS HEAD Left 08/18/2016    Procedure: EXCISE MASS HEAD;  Surgeon: Ivan Hernandez MD;  Location: [facility]    HEART CATH, ANGIOPLASTY  04/2012    IR CHEST PORT PLACEMENT > 5 YRS OF AGE  8/9/2024    IR PORT REMOVAL RIGHT  2/17/2025    LAPAROSCOPIC CHOLECYSTECTOMY N/A 03/17/2019    Procedure: LAPAROSCOPIC CHOLECYSTECTOMY;  Surgeon: Janel Paz MD;  Location:  OR    OPTICAL TRACKING SYSTEM CRANIOTOMY, EXCISE TUMOR, COMBINED Right 4/22/2025    Procedure: RIGHT FRONTO-TEMPORAL CRANIOTOMY, USING OPTICAL TRACKING SYSTEM, WITH NEOPLASM EXCISION;  Surgeon: Davie Erickson MD;  Location:  OR    PHACOEMULSIFICATION CLEAR CORNEA WITH STANDARD INTRAOCULAR LENS IMPLANT Left 06/08/2015    Procedure: PHACOEMULSIFICATION CLEAR CORNEA WITH STANDARD INTRAOCULAR LENS IMPLANT;  Surgeon: Nixon Farnsworth MD;  Location:  EC    PHACOEMULSIFICATION CLEAR CORNEA WITH STANDARD INTRAOCULAR LENS IMPLANT Right 06/22/2015    Procedure: PHACOEMULSIFICATION CLEAR CORNEA WITH STANDARD INTRAOCULAR LENS IMPLANT;  Surgeon: Nixon Farnsworth MD;  Location:  EC    SEPTOPLASTY, TURBINOPLASTY, COMBINED Bilateral 08/18/2016    Procedure: COMBINED SEPTOPLASTY, TURBINOPLASTY;  Surgeon: Ivan Hernandez MD;  Location: [facility]    Z NONSPECIFIC PROCEDURE  1997    CABG ([facility])    [facility] NONSPECIFIC PROCEDURE  08/2001    stress echo       SOCIAL HISTORY  Reviewed and updated in Epic.    Living Situation: Lives in an apartment with his adult son (Harpreet)  Vocational History: Retired   Support: Son  Hobbies: Reading     Per chart review: Pascual Perea  reports that he quit smoking about 60 years ago. His smoking use included cigarettes, cigars, and pipe. He started smoking about 67 years ago. He has a 6.5 pack-year smoking history. He has never used smokeless tobacco. He reports that he does not currently use alcohol. He  reports that he does not use drugs.    PRIOR FUNCTIONAL HISTORY   Independent at baseline. Mobilized independently with use of a single point cane. Patient's children did most cooking, cleaning, and shopping.      Social History     Socioeconomic History    Marital status:      Spouse name: Not on file    Number of children: Not on file    Years of education: Not on file    Highest education level: Not on file   Occupational History    Not on file   Tobacco Use    Smoking status: Former     Current packs/day: 0.00     Average packs/day: 1 pack/day for 6.5 years (6.5 ttl pk-yrs)     Types: Cigarettes, Cigars, Pipe     Start date:      Quit date: 1964     Years since quittin.8    Smokeless tobacco: Never    Tobacco comments:     10/16/24 Pt states he will an occasional cigar.    Vaping Use    Vaping status: Never Used   Substance and Sexual Activity    Alcohol use: Not Currently     Comment: couple drinks a year    Drug use: No    Sexual activity: Yes     Partners: Female     Birth control/protection: Abstinence   Other Topics Concern    Parent/sibling w/ CABG, MI or angioplasty before 65F 55M? No     Service Not Asked    Blood Transfusions Not Asked    Caffeine Concern Yes     Comment: 2 cups coffee a day    Occupational Exposure Not Asked    Hobby Hazards Not Asked    Sleep Concern Yes     Comment: sleep apnea, wears cpap off and on    Stress Concern No    Weight Concern No    Special Diet Yes     Comment: diabetic diet     Back Care Not Asked    Exercise No     Comment: occ walk the mall    Bike Helmet Not Asked    Seat Belt Yes    Self-Exams Not Asked   Social History Narrative    Not on file     Social Drivers of Health     Financial Resource Strain: Unknown (2025)    Financial Resource Strain     Within the past 12 months, have you or your family members you live with been unable to get utilities (heat, electricity) when it was really needed?: Patient unable to answer   Food  Insecurity: Unknown (4/22/2025)    Food Insecurity     Within the past 12 months, did you worry that your food would run out before you got money to buy more?: Patient unable to answer     Within the past 12 months, did the food you bought just not last and you didn t have money to get more?: Patient unable to answer   Transportation Needs: Unknown (4/22/2025)    Transportation Needs     Within the past 12 months, has lack of transportation kept you from medical appointments, getting your medicines, non-medical meetings or appointments, work, or from getting things that you need?: Patient unable to answer   Physical Activity: Insufficiently Active (2/9/2025)    Exercise Vital Sign     Days of Exercise per Week: 7 days     Minutes of Exercise per Session: 20 min   Stress: No Stress Concern Present (2/9/2025)    [employer] [place] [employer] - Occupational Stress Questionnaire     Feeling of Stress : Not at all   Social Connections: Unknown (2/9/2025)    Social Connection and Isolation Panel [NHANES]     Frequency of Communication with Friends and Family: Not on file     Frequency of Social Gatherings with Friends and Family: Three times a week     Attends Congregation Services: Not on file     Active Member of Clubs or Organizations: Not on file     Attends Club or Organization Meetings: Not on file     Marital Status: Not on file   Interpersonal Safety: Low Risk  (4/22/2025)    Interpersonal Safety     Do you feel physically and emotionally safe where you currently live?: Yes     Within the past 12 months, have you been hit, slapped, kicked or otherwise physically hurt by someone?: No     Within the past 12 months, have you been humiliated or emotionally abused in other ways by your partner or ex-partner?: No   Housing Stability: Unknown (4/22/2025)    Housing Stability     Do you have housing? : Patient unable to answer     Are you worried about losing your housing?: Patient unable to answer         FAMILY  HISTORY  Reviewed and updated in Epic.  Family History   Problem Relation Age of Onset    Genitourinary Problems Father         d: age 89; ARF    Hypertension Father     Diabetes Mother         d: age 68, CAD    Heart Disease Mother         MI    Myocardial Infarction Mother     Cardiovascular Brother         d:  age 31; CAD    Heart Disease Brother         age 31, MI    Diabetes Sister         b:  1939; DM2    Diabetes Sister        MEDICATIONS  Scheduled meds  Medications Prior to Admission   Medication Sig Dispense Refill Last Dose/Taking    Artificial Saliva (ACT DRY MOUTH) LOZG Take 1 lozenge by mouth at bedtime.       atorvastatin (LIPITOR) 40 MG tablet TAKE 1 TABLET (40 MG) BY MOUTH DAILY 90 tablet 2     azelastine 137 MCG/SPRAY SOLN Lizella 2 sprays into both nostrils 2 times daily. 30 mL 1     blood glucose (ACCU-CHEK NORMAN PLUS) test strip USE TO TEST BLOOD SUGAR 3 TIMES A DAY OR AS DIRECTED 100 strip 2     blood glucose calibration (ACCU-CHEK NORMAN) solution USE AS DIRECTED 1 each 0     chlorhexidine (HIBICLENS) 4 % solution Apply topically See Admin Instructions. The night before surgery, take shower as you normally would. Then apply 1/2 bottle as body wash from neck down, avoid groin, let sit for 1 minute, and rinse off. Repeat the morning of surgery. 236 mL 0     Continuous Glucose  (FREESTYLE DELFINA 2 READER) CHRYSTAL 1 Device continuously. 1 each 1     Continuous Glucose Sensor (FREESTYLE DELFINA 2 SENSOR) [facility] [place] 1 SENSOR AND CHANGE EVERY 14 DAYS AS DIRECTED 6 each 3     cyanocobalamin (VITAMIN B-12) 1000 MCG tablet Take 1 tablet (1,000 mcg) by mouth daily.       dexAMETHasone (DECADRON) 1 MG tablet Take 2 tablets (2 mg) by mouth daily for 1 day, THEN 1 tablet (1 mg) daily for 3 days. 5 tablet 0     fenofibrate micronized (LOFIBRA) 67 MG capsule TAKE 1 CAPSULE BY MOUTH EVERY MORNING BEFORE BREAKFAST 90 capsule 3     insulin glargine (LANTUS SOLOSTAR) 100 UNIT/ML pen Inject 20 Units subcutaneously  "2 times daily.       insulin pen needle (GLOBAL EASE INJECT PEN NEEDLES) 31G X 5 MM miscellaneous Use one pen needle 5 times a day 500 each 3     isosorbide mononitrate (IMDUR) 30 MG 24 hr tablet Take 1 tablet (30 mg) by mouth daily 90 tablet 3     levETIRAcetam (KEPPRA) 750 MG tablet Take 2 tablets (1,500 mg) by mouth 2 times daily.       levothyroxine (SYNTHROID/LEVOTHROID) 125 MCG tablet Take 1 tablet (125 mcg) by mouth daily 90 tablet 3     oxyCODONE (ROXICODONE) 5 MG tablet Take 1 tablet (5 mg) by mouth every 6 hours as needed for moderate pain. 30 tablet 0     polyethylene glycol 400 (BLINK TEARS) 0.25 % SOLN ophthalmic solution Place 1 drop into both eyes daily.       senna-docusate (SENOKOT-S/PERICOLACE) 8.6-50 MG tablet Take 1 tablet by mouth or Feeding Tube 2 times daily as needed for constipation. 28 tablet 1        ALLERGIES     Allergies   Allergen Reactions    Omeprazole      diarrhea    Pantoprazole      diarrhea       REVIEW OF SYSTEMS  A 10 point ROS was performed and negative unless otherwise noted in HPI.     PHYSICAL EXAM  VITAL SIGNS:  /60 (BP Location: Left arm, Patient Position: Sitting, Cuff Size: Adult Regular)   Pulse 67   Resp 19   SpO2 97%   BMI:  Estimated body mass index is 25.64 kg/m  as calculated from the following:    Height as of 4/22/25: 1.803 m (5' 11\").    Weight as of 4/26/25: 83.4 kg (183 lb 13.8 oz).     Gen: No acute distress, cooperative, lying in bed  CV: Regular rate and rhythm, no murmurs, rubs, or gallops  Respiratory: CTAB, no wheezing, rales, or rhonchi. Breathing comfortably on room air. No signs of respiratory distress.  Abd: Bowel sounds present. Soft, non-distended, non-tender to palpation.  Extremities: No evidence of bilateral lower extremity edema.  Skin: R cranial incision is open to air and appears well-approximated, clean, dry, and intact, no surrounding erythema or drainage, bruising surrounding R eye  Neuromuscular: Speech fluent & comprehensible. "    Cranial Nerves:  - 2nd CN: Pupils equal, round, reactive to light.  - 5th CN: facial sensation intact   - 7th CN: face symmetrical   - 8th CN: functional hearing bilaterally  - 9th, 10th CN: palate elevates symmetrically   - 12th CN: tongue midline and without fasciculations     Sensory: Normal to light touch in bilateral upper and lower extremities     Motor:                                 Right                          Left  Shoulder abd                  5/5                              5/5  Elbow Flexion                 5/5                              5/5  Elbow Extension             5/5                              5/5  Wrist Extension              4+/5                             5/5                                   5/5                              5/5  Abd dig. Minimi                5/5                            4+/5    Hip Flexion                      5/5                              5/5  Knee Extension               5/5                              5/5  Dorsiflexion                     5/5                              5/5  Plantarflexion                  5/5                              5/5     Reflexes: Normal bilateral reflexes for P reflexes, reduced bilateral B, BR, and A reflexes (symmetrical)   Babinski reflex: upgoing on R   Abnormal movements: None    Coordination: No dysmetria on finger to nose b/l     LABS  Recent Results (from the past 24 hours)   Glucose by meter    Collection Time: 05/03/25  4:32 PM   Result Value Ref Range    GLUCOSE BY METER POCT 158 (H) 70 - 99 mg/dL   Glucose by meter    Collection Time: 05/03/25  9:07 PM   Result Value Ref Range    GLUCOSE BY METER POCT 242 (H) 70 - 99 mg/dL   Glucose by meter    Collection Time: 05/04/25  2:09 AM   Result Value Ref Range    GLUCOSE BY METER POCT 110 (H) 70 - 99 mg/dL   Magnesium    Collection Time: 05/04/25  6:56 AM   Result Value Ref Range    Magnesium 2.0 1.7 - 2.3 mg/dL   Phosphorus    Collection Time: 05/04/25  6:56 AM    Result Value Ref Range    Phosphorus 3.5 2.5 - 4.5 mg/dL   Glucose by meter    Collection Time: 05/04/25  7:22 AM   Result Value Ref Range    GLUCOSE BY METER POCT 53 (L) 70 - 99 mg/dL   Glucose by meter    Collection Time: 05/04/25  8:14 AM   Result Value Ref Range    GLUCOSE BY METER POCT 69 (L) 70 - 99 mg/dL       IMPRESSION/PLAN:  Pascual Perea is a 84 year old right hand dominant male with past medical history of 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 CT head with right temporal lobe mass with associated hemorrhagic aspect, significant surrounding vasogenic edema, and right to left midline shift, later confirmed on MRI. Patient is now s/p right frontotemporal craniotomy for resection of right temporal brain tumor on 4/22/2025.  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 Post operative course complicated by cardiopulmonary arrest in PACU requiring re-intubation and CPR with ROSC along with seizure in PACU. Deficits include impaired activity tolerance, impaired balance, impaired judgement and safety awareness, impaired strength, impaired tone, and pain. Patient will require and benefit from PT and OT services as well as all of the ancillary services offered in the inpatient rehab setting.      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     PT and OT for 90 minutes of each on a daily basis, 6x a week, for 9 days in addition to rehab nursing and close management of physiatrist.     Impairment of ADL's:  OT for 90 min daily,  6x a week for 9 days to work on upper and lower body self care, dressing, toileting, bathing, energy conservation techniques with use of ADs as needed   Impairment of mobility:   PT for 90 min daily, 6x a week for 9 days to work on gait exercises, strengthening, endurance buildup, transfers with use of walker as needed   Rehab RN to administer medication, patient education on medication taking, VS monitoring, bowel regimen, glucose monitoring and wound care/surgical wound dressing changes and monitoring.     Medical Conditions  Brain mass with hemorrhage and vasogenic edema s/p R frontotemporal craniotomy and tumor resection (4/22/2025)  Encephalopathy due to brain mass/brain edema  High grade neuroendocrine tumor, large cell type, left parotid gland s/p chemoradiation   Patient presented on 4/16/2025 with lethargy, somnolence, word-finding/memory difficulties, imbalance with walking, and generalized lower extremity weakness. CT head with 5.3 x 4 x 3.8 cm right temporal lobe mass with associated hemorrhagic aspect, significant surrounding vasogenic edema, and right to left midline shift 6.6 mm. MRI brain with 5.3 x 3.8 x 4.3 cm right temporal lobe mass with extensive surrounding vasogenic edema, hemorrhagic signal within the mass, and right to left midline shift 4 to 5 mm. Pt is now s/p right frontotemporal craniotomy for resection of right temporal brain tumor on 4/22/2025.  Patient with history of neuroendocrine tumor, which was thought to be in remission (follows with [facility] (Dr. Butler)). 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 Neurosurgery, oncology, and neurology followed patient while in acute care. Please see cancer treatment history outlined by Dr. Bravo in his note from 4/23.  Essentially, he notes that after patient received a course of definitive chemoradiation therapy for left parotid small cell carcinoma, a PET CT on 12/23/2024 showed complete  "metabolic response with no evidence of residual disease. Per Dr. Bravo on 4/30, \"Given his limited disease extent and improved clinical status, I discussed recommendations for post-operative stereotactic radiation therapy to the surgical cavity and residual disease to 3000 cGy in 5 fractions.   - Per oncology: Brain MRI and CT simulation in 2-3 weeks pending his discharge from the hospital and rehab with initiation of post-operative stereotactic radiation therapy approximately 1 week later  - Neuro checks PRN  - Platelet goal > 75k  - Currently on 2 week decadron taper-1mg Qday through 5/6, then stop  - Wound cares: May leave incision open to air, cover with light gauze if patient is picking at incision  - Pain regimen:   - Tylenol 650mg Q4H PRN   - Oxycodone 5-10mg Q6H PRN   - Keppra 1500mg BID per general neurology  - Goal SBP <150 - hydralazine PRN ordered  - May resume Eliquis for afib POD#14 (5/6)  - Craniotomy precautions  - Lorazepam 2mg IV PRN for seizure >2 minutes  - Zofran 4mg Q6H PRN     Moderate oral and pharyngeal dysphagia  Patient initially had NG tube in place secondary to dysphagia. He was followed by SLP in acute care. His diet was gradually advanced. On 4/28, patient was eating 100% of meals and tube feeds were tapered and discontinued. Feeding tube was removed on 4/29. On 5/1, patient advanced to regular diet with thin liquids. Prior to discharge from acute care, SLP signed off.   - Regular diet with thin liquids     Falls  Back pain, improving  RRT was called x2 on 4/28 for falls, not resulting in significant injury.  The first episode was unwitnessed. Patient reported that his left knee gave way and he \"sat down.\"  The second episode was assisted and patient was lowered back onto his bed.  Fall precautions were added. Patient briefly had bedside attendant, but this was discontinued on 4/29. On 4/30, patient reporting back pain. Neurosurgery ordered x-ray of lumbar spine which showed osteopenia, " "but otherwise unremarkable. Pain improving per patient.  - Fall precautions  - Continue PT/OT  - Up with assist only, including use of gait belt and walker  - Abdominal binder PRN for comfort     Coronary artery disease status post CABG  LVH  Mild troponin elevation  Postoperative hypotension, CODE BLUE activated  Troponin 42->45 on admission. Patient denied chest pain. EKG reviewed by cardiology initially due to ST elevations in setting of repolarization abnormality, but not felt to be ischemic. Likely secondary to brain mass/bleed. See postoperative note from Dr. Erickson, patient had \"hypotensive episode following extubation in the PACU to the point that he required activation of CODE BLUE.  Patient also required CPR\" with achievement of ROSC. BPs more well-controlled since.  - Continue PTA Imdur      Atrial fibrillation with slow ventricular response  On cardiac monitoring in acute care, which showed evidence of ongoing, asymptomatic bradycardia.  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 Creatinine 1.22 on admission to acute care.   - Avoid nephrotoxins  - BMP qM/Th     Diabetes mellitus, type 2, on long term insulin   HgbA1c 6.9% 2/10/25. Increased to 7.9% on 4/23. Prior to admission on glargine 40 units BID and aspart 3 units per carb unit. Initially, patient was on insulin drip, which was discontinued on 4/28 with resumption of patient's PTA basal insulin and slightly lower dose of prandial insulin (1 unit per 15g of carbohydrate) with high dose sliding scale insulin. On 5/1, morning blood sugar reading was 57 mg/dL.  Patient ate all of his breakfast, with resolution of hypoglycemia.  Decreased from high correction scale to medium correction scale and " decreased Lantus from 40 to 35 units twice daily. On 5/2, morning blood sugar reading was 62 mg/dl.  Patient asymptomatic.  Reduced Lantus to 30 units twice daily and reduced prandial NovoLog to 1 unit per 20 g of carbohydrate Hypoglycemia persisted on 5/3, most likely in setting of dexamethasone taper. Prandial NovoLog was discontinued and Lantus was reduced to 20 units BID. Prior to admission, patient's AM BG 66. Sliding scale insulin was discontinued and Lantus was decreased to 15mg qAM and 10mg QHS.  - Hypoglycemia protocol   - Switch Lantus from 15mg qAM and 10mg QHS to 20mg qAM (eliminate evening dose given low AM blood sugars and long-acting nature of Lantus)  - Medium dose SSI     Hyperlipidemia  - Continue PTA atorvastatin and fenofibrate      Hypothyroidism  - Continue PTA levothyroxine     Osteopenia  Evidenced on 4/30 L-spine XR.  - PCP to assess fracture risk and need for further work-up/treatment on discharge    Vitamin B12 deficiency  - Continue cyanocobalamin 1000mcg Qday    Dry mouth  - Artifical saliva throat spray QHS (substituted for PTA ACT dry mouth lozenge)    Allergies  - Continue PTA azelastine 2 sprays BID and artifical tears 1 drop BID (substituted for PTA polyethylene glycol drops)    Adjustment to disability: Clinical psychology to eval and treat if indicated   FEN: Regular diet with thin liquids  Bladder: Continent  Bowel: Miralax Qday, senna-docusate 1 tablet Qday, PRN bowel meds available  DVT Prophylaxis: Mechanical, can resume Apixaban on 5/6  GI Prophylaxis: N/A  Code: Full   Disposition: TBD, to discuss at interdisciplinary rounds   ELOS/Discharge Date:  7 days  Rehab prognosis:  Good   Follow up Appointments on Discharge:   - Outpatient PCP  - Outpatient PM&R  - Outpatient therapies  - Oncology with brain MRI and CT simulation in 2-3 weeks pending his discharge from the hospital and rehab with initiation of post-operative stereotactic radiation therapy approximately 1 week  later  - Neurosurgery  - Neurology in 6-8 weeks    Patient reviewed with attending physician, Dr. Reid, who agrees with the above assessment and plan.    Suzanne Purdy MD  PGY-2  [facility] PM&R  05/04/2025

## 2025-05-04 NOTE — PROGRESS NOTES
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 Admitted on 4/16/2025.      Brain mass with hemorrhage, vasogenic edema   Encephalopathy due to brain mass/brain edema  High grade neuroendocrine tumor, large cell type, left parotid gland s/p chemoradiation   *Presents with increased lethargy, somnolence, word-finding and memory difficulty, generalized weakness in legs   *CT head with 5.3 x 4 x 3.8 cm right temporal lobe mass with associated hemorrhagic aspect, significant surrounding vasogenic edema, right to left midline shift 6.6 mm  *MRI brain with 5.3 x 3.8 x 4.3 cm right temporal lobe mass with extensive surrounding vasogenic edema, hemorrhagic signal within the mass, right to left midline shift 4 to 5 mm  *Hx of neuroendocrine tumor as above, follows with [facility] (Dr. Butler), felt to be in remission  Neurosurgery consult requested.  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  Please see cancer treatment history outlined by Dr. Bravo in his note from 4/23.  Essentially, he notes that after a patient received a course of definitive chemoradiation therapy for left parotid small cell carcinoma a PET CT on 12/23/2024 showed complete metabolic response with no evidence of residual disease.  Per Dr. Bravo on 4/30, \"Given his limited disease extent and improved clinical status, I discussed recommendations for post-operative stereotactic radiation therapy to the surgical cavity and residual disease to 3000 cGy in 5 fractions.   We will plan for brain MRI and CT simulation in 2-3 weeks pending his discharge " "from the hospital and rehab with initiation of treatment approximately 1 week later.\"  I appreciate his followup.  Neurochecks, steroids, wound cares, pain control per neurosurgery  AEDs per general neurology, now on Keppra 1500 mg twice daily  Goal SBP <150, PRN IV labetalol, hydralazine ordered    Moderate oral and pharyngeal dysphagia  Now on IDDSI level 5 (minced/moist) diet and thin liquids  Continue swallowing treatments with SLP  4/28: Discussed with nutrition.  Patient is eating well, ate 100% of his tray and is asking for more food.  Taper and discontinue tube feedings, leave enteral tube in place for now.  4/29: Patient is once again eating 100% of meals.  Removed feeding tube.  5/1: Per SLP, \"patient's swallow function is improving but remains below his baseline.  Recommended advance to regular solids.\"    Falls  Back pain  RRT was called x 2 on 4/28 for falls, not resulting in significant injury.  The first episode was unwitnessed, patient reported that his left knee gave way and he \"sat down.\"  The second episode was assisted, patient was lowered back onto his bed.  Fall precautions were added  Continue PT, OT  up with assist only, including use of gait belt and walker  Briefly had bedside attendant, discontinued 4/29  PT, patient complained of back pain.  Neurosurgery ordered x-ray of lumbar spine which shows osteopenia, is otherwise unremarkable.  5/3:  patient says back pain continues to improve    Coronary artery disease status post CABG  LVH  Mild troponin elevation  Postoperative hypotension, CODE BLUE activated  Troponin 42->45. Denies chest pain. EKG reviewed by cardiology initially due to ST elevations in setting of repolarization abnormality, not felt to be ischemic. Likely secondary to brain mass/bleed.   See postoperative note from Dr. Erickson, patient had \"hypotensive episode following extubation in the PACU to the point that he required activation of CODE BLUE.  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 Creatinine 1.22 on admission.   Currently around baseline  Avoid nephrotoxins  Monitor BMP      Diabetes mellitus, type 2, on long term insulin   HgbA1c 6.9% 2/10/25. Prior to admission on glargine 40 units BID, aspart 3 units per carb unit.  4/28:Remains on insulin gtt, 6 units/hr  We discussed diabetes management.  He says his diabetes was diagnosed 35 years ago, he checks his own blood sugars and recognizes symptoms of hyper and hypoglycemia.  He was able to describe what he would do if his blood sugar was low.  He recalls his prior to admission dose of basal insulin.  4/28:  Discontinue insulin drip, resume PTA basal insulin, Lantus 40 units twice daily and PTA prandial insulin at slightly lower dose, 1 unit per 15 g of carbohydrate with high scale corrective dose insulin  Hypoglycemia protocol  5/4: Hypoglycemia persists, dexamethasone is being tapered.  Discontinued prandial NovoLog, reduced Lantus to 15 units in a.m., 10 units at bedtime, continue medium scale corrective dose insulin     Hyperlipidemia  Continue PTA atorvastatin, fenofibrate      Hypothyroidism  Continue PTA levothyroxine           Diet: Room Service  Snacks/Supplements Adult: Other; offer supplement with meals; With Meals  Regular Diet Adult (supervision due to impulsivenss, slow rate, small bites/sips, alternate liquids/solids. Hold diet if increased coughing or changes in his respiratory status.)  Diet    DVT Prophylaxis: Pneumatic Compression Devices  Duran Catheter: Not " "present  Lines: None     Cardiac Monitoring: None  Code Status: Full Code      Clinically Significant Risk Factors               # Hypoalbuminemia: Lowest albumin = 3.3 g/dL at 4/23/2025  5:31 PM, will monitor as appropriate   # Thrombocytopenia: Lowest platelets = 78 in last 2 days, will monitor for bleeding   # Hypertension: Noted on problem list           # DMII: A1C = 7.9 % (Ref range: <5.7 %) within past 6 months   # Overweight: Estimated body mass index is 25.64 kg/m  as calculated from the following:    Height as of this encounter: 1.803 m (5' 11\").    Weight as of this encounter: 83.4 kg (183 lb 13.8 oz).        # Financial/Environmental Concerns:           Social Drivers of Health           Disposition Plan     Medically Ready for Discharge: Medically ready now       Hilaria Ly MD  Hospitalist Service  [facility]  Securely message with [employer] (more info)  Text page via [employer] Paging/Directory   ______________________________________________________________________    Interval History   \"At home when that happens (e.g. low blood sugar), an alarm wakes me up.\"  Patient again says he does not have symptoms associated with hypoglycemia.  He is feeling well and asked how long he will need to stay at the rehab unit.  He wants to get home in time for his grandson's wedding on May 20.  He has no new respiratory or GI complaints    Physical Exam   Vital Signs: Temp: 97.5  F (36.4  C) Temp src: Oral BP: 125/75 Pulse: 54   Resp: 16 SpO2: 100 % O2 Device: None (Room air)    Weight: 183 lbs 13.82 oz    Constitutional:  Awake, alert  HEENT:  long incision right frontotemporal scalp without redness or drainage  Respiratory: Clear to auscultation bilaterally, no crackles or wheezing  Cardiovascular: bradycardic, with murmur in systole  GI: Normal bowel sounds, soft, non-distended, non-tender  Skin/Integumen: No rash on exposed skin.  No lower extremity edema  Other: Mood is pleasant and " outgoing    Medical Decision Making       40 MINUTES SPENT BY ME on the date of service doing chart review, history, exam, documentation & further activities per the note, including reconciling medications for discharge   also discussion with patient and bedside RN    Data     I have personally reviewed the following data over the past 24 hrs:    4.6  \   9.3 (L)   / 78 (L)     139 102 19.1 /  53 (L)   4.0 28 0.97 \       Imaging results reviewed over the past 24 hrs:   No results found for this or any previous visit (from the past 24 hours).

## 2025-05-04 NOTE — PHARMACY-ADMISSION MEDICATION HISTORY
Admission medication history completed at [facility]. Please see Pharmacist Admission Medication History note from 4/16/2025.

## 2025-05-04 NOTE — PLAN OF CARE
"  VS: /51 (BP Location: Left arm)   Pulse 67   Temp 98.5  F (36.9  C) (Oral)   Resp 19   Ht 1.803 m (5' 11\")   Wt 79.4 kg (175 lb 1.6 oz)   SpO2 96%   BMI 24.42 kg/m       O2: RA   Output: 200 mL recorded and one urination in toilet without difficulty PVR's are complete   Last BM: 5/3   Activity: SBA walker and gait belt   Up for meals? yes   Skin: R cranial incision, healed R chest incision. Scattered bruising BUE, R eye bruise   Pain: 2/10   PRN tylenol given    CMS: A&O x 4   Dressing: N/A   Diet: Regular, thin, whole   LDA: N/A   Equipment: Walker   Plan: Continue plan of care   Additional Info: Ate dinner prior to getting BG checked.  MD notified, no orders for coverage pt. Will be covered at [facility].  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 SAMANTHA sent to MD    Goal Outcome Evaluation:                        "

## 2025-05-05 ENCOUNTER — APPOINTMENT (OUTPATIENT)
Dept: OCCUPATIONAL THERAPY | Facility: CLINIC | Age: 85
DRG: 843 | End: 2025-05-05
Attending: PHYSICAL MEDICINE & REHABILITATION
Payer: COMMERCIAL

## 2025-05-05 ENCOUNTER — APPOINTMENT (OUTPATIENT)
Dept: SPEECH THERAPY | Facility: CLINIC | Age: 85
DRG: 843 | End: 2025-05-05
Attending: PHYSICAL MEDICINE & REHABILITATION
Payer: COMMERCIAL

## 2025-05-05 ENCOUNTER — APPOINTMENT (OUTPATIENT)
Dept: PHYSICAL THERAPY | Facility: CLINIC | Age: 85
DRG: 843 | End: 2025-05-05
Attending: PHYSICAL MEDICINE & REHABILITATION
Payer: COMMERCIAL

## 2025-05-05 LAB
ANION GAP SERPL CALCULATED.3IONS-SCNC: 6 MMOL/L (ref 7–15)
BASOPHILS # BLD AUTO: 0 10E3/UL (ref 0–0.2)
BASOPHILS NFR BLD AUTO: 0 %
BUN SERPL-MCNC: 18.7 MG/DL (ref 8–23)
CALCIUM SERPL-MCNC: 9.4 MG/DL (ref 8.8–10.4)
CHLORIDE SERPL-SCNC: 103 MMOL/L (ref 98–107)
CREAT SERPL-MCNC: 1.09 MG/DL (ref 0.67–1.17)
EGFRCR SERPLBLD CKD-EPI 2021: 67 ML/MIN/1.73M2
EOSINOPHIL # BLD AUTO: 0.1 10E3/UL (ref 0–0.7)
EOSINOPHIL NFR BLD AUTO: 2 %
ERYTHROCYTE [DISTWIDTH] IN BLOOD BY AUTOMATED COUNT: 15.9 % (ref 10–15)
GLUCOSE BLDC GLUCOMTR-MCNC: 114 MG/DL (ref 70–99)
GLUCOSE BLDC GLUCOMTR-MCNC: 171 MG/DL (ref 70–99)
GLUCOSE BLDC GLUCOMTR-MCNC: 178 MG/DL (ref 70–99)
GLUCOSE BLDC GLUCOMTR-MCNC: 214 MG/DL (ref 70–99)
GLUCOSE BLDC GLUCOMTR-MCNC: 90 MG/DL (ref 70–99)
GLUCOSE SERPL-MCNC: 92 MG/DL (ref 70–99)
HCO3 SERPL-SCNC: 29 MMOL/L (ref 22–29)
HCT VFR BLD AUTO: 28.4 % (ref 40–53)
HGB BLD-MCNC: 9.5 G/DL (ref 13.3–17.7)
IMM GRANULOCYTES # BLD: 0 10E3/UL
IMM GRANULOCYTES NFR BLD: 1 %
LYMPHOCYTES # BLD AUTO: 0.8 10E3/UL (ref 0.8–5.3)
LYMPHOCYTES NFR BLD AUTO: 23 %
MAGNESIUM SERPL-MCNC: 2 MG/DL (ref 1.7–2.3)
MCH RBC QN AUTO: 30.3 PG (ref 26.5–33)
MCHC RBC AUTO-ENTMCNC: 33.5 G/DL (ref 31.5–36.5)
MCV RBC AUTO: 90 FL (ref 78–100)
MONOCYTES # BLD AUTO: 0.3 10E3/UL (ref 0–1.3)
MONOCYTES NFR BLD AUTO: 8 %
NEUTROPHILS # BLD AUTO: 2.4 10E3/UL (ref 1.6–8.3)
NEUTROPHILS NFR BLD AUTO: 66 %
NRBC # BLD AUTO: 0 10E3/UL
NRBC BLD AUTO-RTO: 0 /100
PHOSPHATE SERPL-MCNC: 3.3 MG/DL (ref 2.5–4.5)
PLATELET # BLD AUTO: 75 10E3/UL (ref 150–450)
POTASSIUM SERPL-SCNC: 4.1 MMOL/L (ref 3.4–5.3)
RBC # BLD AUTO: 3.14 10E6/UL (ref 4.4–5.9)
SODIUM SERPL-SCNC: 138 MMOL/L (ref 135–145)
WBC # BLD AUTO: 3.6 10E3/UL (ref 4–11)

## 2025-05-05 PROCEDURE — 97535 SELF CARE MNGMENT TRAINING: CPT | Mod: GO | Performed by: OCCUPATIONAL THERAPIST

## 2025-05-05 PROCEDURE — 96125 COGNITIVE TEST BY HC PRO: CPT | Mod: GN

## 2025-05-05 PROCEDURE — 84100 ASSAY OF PHOSPHORUS: CPT

## 2025-05-05 PROCEDURE — 36415 COLL VENOUS BLD VENIPUNCTURE: CPT

## 2025-05-05 PROCEDURE — 83735 ASSAY OF MAGNESIUM: CPT

## 2025-05-05 PROCEDURE — 99233 SBSQ HOSP IP/OBS HIGH 50: CPT | Performed by: PHYSICAL MEDICINE & REHABILITATION

## 2025-05-05 PROCEDURE — 250N000012 HC RX MED GY IP 250 OP 636 PS 637

## 2025-05-05 PROCEDURE — 97166 OT EVAL MOD COMPLEX 45 MIN: CPT | Mod: GO | Performed by: OCCUPATIONAL THERAPIST

## 2025-05-05 PROCEDURE — 250N000013 HC RX MED GY IP 250 OP 250 PS 637

## 2025-05-05 PROCEDURE — 97162 PT EVAL MOD COMPLEX 30 MIN: CPT | Mod: GP

## 2025-05-05 PROCEDURE — 128N000003 HC R&B REHAB

## 2025-05-05 PROCEDURE — 82310 ASSAY OF CALCIUM: CPT

## 2025-05-05 PROCEDURE — 250N000013 HC RX MED GY IP 250 OP 250 PS 637: Performed by: PHYSICAL MEDICINE & REHABILITATION

## 2025-05-05 PROCEDURE — 97530 THERAPEUTIC ACTIVITIES: CPT | Mod: GP

## 2025-05-05 PROCEDURE — 85025 COMPLETE CBC W/AUTO DIFF WBC: CPT

## 2025-05-05 RX ADMIN — LEVETIRACETAM 1500 MG: 750 TABLET, FILM COATED ORAL at 21:08

## 2025-05-05 RX ADMIN — CYANOCOBALAMIN TAB 1000 MCG 1000 MCG: 1000 TAB at 08:00

## 2025-05-05 RX ADMIN — LEVOTHYROXINE SODIUM 125 MCG: 125 TABLET ORAL at 07:59

## 2025-05-05 RX ADMIN — LEVETIRACETAM 1500 MG: 750 TABLET, FILM COATED ORAL at 08:09

## 2025-05-05 RX ADMIN — INSULIN GLARGINE 20 UNITS: 100 INJECTION, SOLUTION SUBCUTANEOUS at 08:22

## 2025-05-05 RX ADMIN — CARBOXYMETHYLCELLULOSE SODIUM 1 DROP: 5 SOLUTION/ DROPS OPHTHALMIC at 21:08

## 2025-05-05 RX ADMIN — CARBOXYMETHYLCELLULOSE SODIUM 1 DROP: 5 SOLUTION/ DROPS OPHTHALMIC at 08:12

## 2025-05-05 RX ADMIN — DEXAMETHASONE 1 MG: 1 TABLET ORAL at 07:59

## 2025-05-05 RX ADMIN — SENNOSIDES AND DOCUSATE SODIUM 1 TABLET: 50; 8.6 TABLET ORAL at 08:00

## 2025-05-05 RX ADMIN — INSULIN ASPART 2 UNITS: 100 INJECTION, SOLUTION INTRAVENOUS; SUBCUTANEOUS at 11:56

## 2025-05-05 RX ADMIN — FENOFIBRATE 54 MG: 54 TABLET ORAL at 07:59

## 2025-05-05 RX ADMIN — ATORVASTATIN CALCIUM 40 MG: 40 TABLET, FILM COATED ORAL at 07:59

## 2025-05-05 RX ADMIN — ISOSORBIDE MONONITRATE 30 MG: 30 TABLET, EXTENDED RELEASE ORAL at 08:00

## 2025-05-05 RX ADMIN — INSULIN ASPART 1 UNITS: 100 INJECTION, SOLUTION INTRAVENOUS; SUBCUTANEOUS at 17:52

## 2025-05-05 RX ADMIN — Medication 1 SPRAY: at 21:10

## 2025-05-05 RX ADMIN — AZELASTINE 2 SPRAY: 1 SPRAY, METERED NASAL at 21:11

## 2025-05-05 RX ADMIN — AZELASTINE 2 SPRAY: 1 SPRAY, METERED NASAL at 08:10

## 2025-05-05 ASSESSMENT — ACTIVITIES OF DAILY LIVING (ADL)
PREVIOUS_RESPONSIBILITIES: MEAL PREP;MEDICATION MANAGEMENT;DRIVING
IADLS,_PREVIOUS_FUNCTIONAL_LEVEL: PARTIAL ASSISTANCE
ADLS_ACUITY_SCORE: 49
BADLS,_PREVIOUS_FUNCTIONAL_LEVEL: INDEPENDENT
ADLS_ACUITY_SCORE: 49
IADLS,_PREVIOUS_FUNCTIONAL_LEVEL: PARTIAL ASSISTANCE
ADLS_ACUITY_SCORE: 49
BADLS,_PREVIOUS_FUNCTIONAL_LEVEL: INDEPENDENT
ADLS_ACUITY_SCORE: 49

## 2025-05-05 NOTE — PLAN OF CARE
Occupational Therapy Discharge Summary    Reason for therapy discharge:    Discharged to acute rehabilitation facility.    Progress towards therapy goal(s). See goals on Care Plan in [facility] electronic health record for goal details.  Goals partially met.  Barriers to achieving goals:   discharge from facility.    Therapy recommendation(s):      Continued therapy is recommended.  Rationale/Recommendations:   .OT Rationale for DC Rec: The patient presents well below baseline with decreased activity tolerance, cognitive deficits, weakness, and post surgical precautions. improving and will be a good rehab candidate for continued therapy in ARU setting to maximize function for self care, ADL and IADL. his goal is to be able to go to his grandson's wedding at end of the month    Writing therapist did not treat pt, note written based on previous treating therapist notes and recommendations. Please see chart and flow sheet for additional details.

## 2025-05-05 NOTE — PROGRESS NOTES
"   05/05/25 1121   Appointment Info   Signing Clinician's Name / Credentials (SLP) Demetrius Robertson MS CCC SLP   General Information   Onset of Illness/Injury or Date of Surgery 04/16/25   Referring Physician Dr. 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 CT head with 5.3 x 4 x 3.8 cm right temporal lobe mass with associated hemorrhagic aspect, significant surrounding vasogenic edema, and right to left midline shift of 6.6 mm, which was later confirmed on MRI. Patient is now s/p right frontotemporal craniotomy for resection of right temporal brain tumor on 4/22/2025.  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 Post operative course complicated by cardiopulmonary arrest in PACU requiring re-intubation and CPR with ROSC along with seizure in PACU.\"   General Observations Patient seen by speech therapy during acute care hospitalization for dysphagia evaluation and treatment with goals being met.  Referred to speech therapy here to further assess cognition in setting of brain tumor resection and cardiac arrest.  Nursing noting impulsivity.   Type of Evaluation   Type of Evaluation Speech, Language, Cognition   Motor Speech   Comment, Motor Speech Assessment Fully intelligible speech   Auditory Comprehension   Comment, Assessment (Auditory Comprehension) Able to consistently follow instructions as needed for cognitive evaluation.   Verbal Expression   Comment, " "Assessment (Verbal Expression) No word finding difficulties evident in casual conversation   Reading Comprehension   Comment, Assessment (Reading Comprehension) Within functional limits for need of cognitive assessment   Written Language   Comment, Assessment (Written Language) Not assessed.  No deficits suspected   Cognition   Cognitive Function attention deficit;executive function deficit;memory deficit   Cognitive Status Impaired   Additional cognitive-linguistic evaluation indicated  Completed;Please see separate report for results   Cognitive Status Exam Comments Formal cognitive assessment RBANS completed with cumulative index score of 84 which correlates to the 14th percentile.  All areas assessed had a overall standard score of 81 or higher.  However individual subtests noting significant difficulties with delayed memory with less recognition and significant variation in scaled scores on both attention subtests with 1 score being a 15 while the other being a 3 when 10 is considered \"average\"   Orientation Status (Cognition) oriented x 4   Affect/Mental Status (Cognition) WFL   Follows Commands (Cognition) WFL   Executive Function Deficit (Cognition) minimal deficit;moderate deficit   Attention Deficit (Cognition) minimal deficit   Memory Deficit (Cognition) minimal deficit   General Therapy Interventions   Planned Therapy Interventions Cognitive Treatment   Clinical Impression   Criteria for Skilled Therapeutic Interventions Met (SLP Eval) Yes, treatment indicated   SLP Diagnosis Mild cognitive-linguistic impairment   Problem List (SLP) Noting impairment with delayed memory, slower processing speeds and impulsivity.   Activity Limitations Related to Problem List (SLP) Decreased level of independence with higher level IADL tasks   Risks & Benefits of therapy have been explained evaluation/treatment results reviewed;care plan/treatment goals reviewed;participants voiced agreement with care plan;participants " included;patient   Clinical Impression Comments Formal cognitive-linguistic assessment completed showing overall mild cognitive impairment.  Patient with limited insights into difficulties.  Patient recognizing changes in his hearing but not making appropriate adjustments to compensate without direct cues to do so such as positioning himself to allow his good ear to hear.  Also not retrieving glasses without verbal cues to do so during a reading task.  Current level of function is likely below his baseline and patient would benefit from skilled intervention to maximize cognitive function and level of independence for return to home.   SLP Goals   Therapy Frequency (SLP Eval) 6 times/week   SLP Predicted Duration/Target Date for Goal Attainment 05/14/25   SLP Goals SLP Goal 1;SLP Goal 2   SLP: Goal 1 Patient will utilize compensatory memory strategies to recall novel information with presence of distractions and delays with 90% accuracy   SLP: Goal 2 Patient will complete moderate to complex level reasoning/problem-solving tasks without need for redirection with 90% accuracy   SLP Discharge Planning   SLP Plan Review scores from RBANS.  Engage in TOMER subtests as treatment.  Introduce and engage in memory strategy use     Repeatable Battery for the Assessment of Neuropsychological Status (RBANS) FORM A   Immediate Memory Visuospatial/  Constructional Language Attention Delayed Memory Total Scale   Index Score 90 81 95 94 81 84   Percentile Rank 25 10 37 34 10 14     SLP:  Pt seen for administration of RBANS. Results are based on a mean of 100 and a standard deviation of +/- 15.   Interpretation: See above  Face to Face Administration: 35 minutes  Scoring/Interpretation: 15 minutes  Total Time: 50 minutes

## 2025-05-05 NOTE — PLAN OF CARE
Discharge Planner Post-Acute Rehab SLP:     Discharge Plan: Home with family support and ongoing therapy    Precautions: Craniotomy, impulsivity    Current Status:  Hearing: Bilateral hearing aids.  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  Slow processing speed and limited insight into impairments  Swallow: Regular texture diet and thin liquids    Assessment: Formal cognitive-linguistic assessment completed.  See progress note for further details    Other Barriers to Discharge (Family Training, etc): Ensure adequate level of support for higher level IADLs

## 2025-05-05 NOTE — PROGRESS NOTES
"   05/05/25 0879   Appointment Info   Signing Clinician's Name / Credentials (OT) Myranda Franco OTR/L   Rehab Comments (OT) Initial OT evaluation       Present no   Language English   Living Environment   People in Home child(sharita), adult   Current Living Arrangements apartment   Home Accessibility other (see comments)   Transportation Anticipated family or friend will provide   Living Environment Comments Pt lives in an apartment, stairs and elevator access available \"I usually go down the stairs and up the elevator\". Bathroom includes in a tubshower with a chair and grab bars. Bedroom - standard bed no rails. Son works full time outside of the home, already assists IADLs.   Self-Care   Usual Activity Tolerance good   Current Activity Tolerance poor   Equipment Currently Used at Home cane, straight   Fall history within last six months yes   Number of times patient has fallen within last six months 2   Activity/Exercise/Self-Care Comment No formal exercise program. Recent falls occured in the hospital \"sudden loss of balance\". Owns a cane and shower chair.   Instrumental Activities of Daily Living (IADL)   Previous Responsibilities meal prep;medication management;driving   IADL Comments Pt is a retired . Enjoys reading. Does complete simple meal prep and medication admin IND. Reports he was still driving. Pt chidren assist with most other IADLs including driving to appointments, grocery shopping, laundry, housekeeping.   Post-Acute Assessment Only   Post-Acute Functional Assessment See below   Previous Level of Function/Home Environm   Bathing, Previous Functional Level independent   Grooming, Previous Functional Level independent   Dressing, Previous Functional Level independent   Eating/Feeding, Previous Functional Level independent   Toileting, Previous Functional Level independent   BADLs, Previous Functional Level independent   IADLs, Previous Functional Level partial " "assistance   Bed Mobility, Previous Functional Level independent   Transfers, Previous Functional Level independent   Household Ambulation, Previous Functional Level uses device or equipment   Stairs, Previous Functional Level other (see comments)  (IND going down stairs, took elevator up to apartment)   Community Ambulation, Previous Functional Level uses device or equipment  (Could walk \"half a block\")   Functional Cognition, Previous Functional Level No concers, was managing medications and still driving   Previous Level of Function IND/Mod IND ADL, some family support for heavier/more complex IADLs, still driving/managing owns medications   Diet/Method of Nutritional Intake, Previous Level thin liquids;regular diet   General Information   Onset of Illness/Injury or Date of Surgery 04/22/25   Referring Physician Josefina Segovia MD   Patient/Family Therapy Goal Statement (OT) Patient goal to attend his grandson's wedding on 5/24   Additional Occupational Profile Info/Pertinent History of Current Problem Pascual Perea is a 84 year old right hand dominant male with past medical history of 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 CT head with right temporal lobe mass with associated hemorrhagic aspect, significant surrounding vasogenic edema, and right to left midline shift, later confirmed on MRI. Patient is now s/p right frontotemporal craniotomy for resection of right temporal brain tumor on 4/22/2025. 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 Post operative course complicated by cardiopulmonary arrest in PACU requiring re-intubation and CPR " with ROSC along with seizure in PACU. Deficits include impaired activity tolerance, impaired balance, impaired judgement and safety awareness, impaired strength, impaired tone, and pain. Patient will require and benefit from PT and OT services as well as all of the ancillary services offered in the inpatient rehab setting.   Performance Patterns (Routines, Roles, Habits) IND/Mod IND ADL, some family support for heavier/more complex IADLs, still driving/managing owns medications   Existing Precautions/Restrictions fall   Limitations/Impairments safety/cognitive;swallowing;hearing   Left Upper Extremity (Weight-bearing Status) other (see comments)  (craniotomy)   Right Upper Extremity (Weight-bearing Status) other (see comments)  (craniotomy)   Left Lower Extremity (Weight-bearing Status) full weight-bearing (FWB)   Right Lower Extremity (Weight-bearing Status) full weight-bearing (FWB)   Heart Disease Risk Factors Medical history;Age   Cognitive Status Examination   Orientation Status place;person   Safety Deficit judgment;problem-solving;insight into deficits/self-awareness;minimal deficit   Memory Deficit minimal deficit   Attention Deficit minimal deficit   Executive Function Deficit planning/decision-making;problem-solving/reasoning;minimal deficit   Visual Perception   Impact of Vision Impairment on Function (Vision) Full assessment limited due to time constraints of session. Recommend further assessment.   Sensory   Sensory Quick Adds sensation intact   Pain Assessment   Patient Currently in Pain Yes, see Vital Sign flowsheet   Range of Motion Comprehensive   Comment, General Range of Motion BUE AROM WFL   Strength Comprehensive (MMT)   Comment, General Manual Muscle Testing (MMT) Assessment BUE strength 5/5   Muscle Tone Assessment   Muscle Tone Quick Adds No deficits were identified   Coordination   Upper Extremity Coordination No deficits were identified   Transfers   Transfer Comments CGA FWW   Balance    Balance Comments CGA standing/dynamic balance   Clinical Impression   Criteria for Skilled Therapeutic Interventions Met (OT) Yes, treatment indicated   OT Diagnosis Decreased IND ADLs/IADLs   OT Problem List-Impairments impacting ADL problems related to;activity tolerance impaired;balance;cognition;mobility;range of motion (ROM)   ADL comments/analysis ADL/IADL performance is below baseline level of function   Assessment of Occupational Performance 5 or more Performance Deficits   Identified Performance Deficits Performance deficits   Planned Therapy Interventions (OT) ADL retraining;IADL retraining;balance training;bed mobility training;cognition;neuromuscular re-education;ROM;strengthening;stretching;transfer training;home program guidelines;progressive activity/exercise;risk factor education   Clinical Decision Making Complexity (OT) detailed assessment/moderate complexity   Risk & Benefits of therapy have been explained evaluation/treatment results reviewed;care plan/treatment goals reviewed;risks/benefits reviewed;current/potential barriers reviewed;participants voiced agreement with care plan;participants included;patient   Clinical Impression Comments Pt is an 84 yr old male admitted to IP ARU following hospitalization for brain mass now s/p R frontotemporal craniotomy and tumor resection. ADL/IADL performance is below baseline, impacted by impaired balance, impaired cognition, and activity precautions. Goals for SBA-Mod IND ADLs and mobility with consideration of medical complexity and cognitive status.  Pt will benefit from skilled OT intervention to address goals in the plan of care and maxiize functional IND and safety in the discharge environment. ELOS 1 week.   OT Total Evaluation Time   OT Eval, Moderate Complexity Minutes (33859) 30   OT Goals   Therapy Frequency (OT) 6 times/week   OT Predicted Duration/Target Date for Goal Attainment 05/12/25   OT Goals Hygiene/Grooming;Upper Body Dressing;Lower  Body Dressing;Upper Body Bathing;Lower Body Bathing;Bed Mobility;Transfers;Toilet Transfer/Toileting;Meal Preparation;Home Management;Cognition;OT Goal 1   OT: Hygiene/Grooming independent   OT: Upper Body Dressing Independent   OT: Lower Body Dressing Supervision/stand-by assist   OT: Upper Body Bathing Supervision/stand-by assist   OT: Lower Body Bathing Supervision/stand-by assist   OT: Bed Mobility Modified independent   OT: Transfer Supervision/stand-by assist;Modified independent   OT: Toilet Transfer/Toileting Supervision/stand-by assist;Modified independent   OT: Meal Preparation Supervision/stand-by assist   OT: Home Management Minimal assist   OT: Cognitive Patient/caregiver will verbalize understanding of cognitive assessment results/recommendations as needed for safe discharge planning   OT: Goal 1 Pt will demo SBA shower transfer using DME/AE prn.   Self-Care/Home Management   Self-Care/Home Mgmt/ADL, Compensatory, Meal Prep Minutes (26075) 30   Symptoms Noted During/After Treatment (Meal Preparation/Planning Training) fatigue   Treatment Detail/Skilled Intervention Initial OT evaluation completed and plan of care established. Provided education on role of OT in IP ARU, goals, and plan of care. Initial assessment of ADL/mobility with FWW. See below for details of functional performance.   OT Discharge Planning   OT Plan Shower - amb to shower room with FWW, shower on bench, ambulatory ADLs with FWW (craniotomy precautions).   Total Session Time   Timed Code Treatment Minutes 30   Total Session Time (sum of timed and untimed services) 60   Post Acute Settings Only   What unit is patient on? Acute Rehab   OT - Acute Rehab Center Time   Individual Time (minutes) - OT 60   Group Time (minutes) - OT 0   Concurrent Time (minutes) - OT 0   Co-Treatment Time (minutes) - OT 0   ARC Total Session Time (minutes) - OT 60   ARC Daily Total Session Time   OT ARC Daily Total Session Time 60   ARC Daily Rehab Total  Minutes 60   Comprehension   Comprehension Comment Koi, requiring hearing aids   Expression   Functional Performance Complete independence expressing complex/abstract ideas   Social Interaction   Functional Performance Complete independence-socially appropriate in all situations   Problem Solving   Functional Performance Minimal direction-needs help to solve routine problems 10-25% of the time   Memory   Functional Performance Minimal prompting-recognizes and remembers 75-90% of the time   Oral Hygiene   Complete independence for oral hygiene tasks no   Describe performance SBA standing oral cares at sink   Environmental assistance for oral hygiene tasks Skilled supervision for safety   Grooming (except oral cares)   Grooming Task Performed Hair grooming;Washing hands;Washing face   Grooming Comment SBA standing grooming at sink   Toileting Hygiene: Ability to maintain perineal hygiene and adjust clothes   Complete independence Toileting Task no   Describe performance CGA standing hygiene/clothing management   Physical assistance to complete Toileting Task Steadying assist only for toileting   Toilet Transfer: standard height (18 inches)   Complete independence with getting on and off a toilet or commode no   Describe performance CGA pivot transfer to toilet with FWW   Physical assistance for getting on and off a toilet or commode Steadying assist for getting on and off the toilet/commode   Chair/bed-to-chair Transfer: standard height (18 inches)   Complete independence for chair-bed-to-chair transfer no   Describe performance CGA pivot transfer to chair FWW   Physical assistance for getting from chair-bed-to-chair Steadying assist for getting from chair-bed-to-chair

## 2025-05-05 NOTE — PLAN OF CARE
"Discharge Plan: home w/ son     Precautions: falls, crani, mild impulsivity    Current Status:  Bed Mobility: Mark rolling, SBA sup<>sit  Transfer: SBA w/ FWW  Gait: Up to 250' w/ FWW, SBA  Stairs: 6\" x12, SBA w/ railing  Balance: Stable dynamic sitting and static standing. Requires UE support w/ dynamic stepping    Outcome Measures:   5TSTS   Worthy  10MWT    Assessment:  Pt presents below baseline s/p right frontotemporal craniotomy for resection of right temporal brain tumor on 4/22/2025. At baseline pt is IND w/ cane at baseline, lives w/ son and has IADL assist. Pt will likely need increased assist, SBA w/ mobility at discharge d/t impulsivity and cognitive impairments. Will need to follow up with family to see if they are able to provide. ELOS 7 days.     Other Barriers to Discharge (DME, Family Training, etc):   Will need to confirm family support.       "

## 2025-05-05 NOTE — PHARMACY-MEDICATION REGIMEN REVIEW
Pharmacy Medication Regimen Review  Pascual Perea is a 84 year old male who is currently in the Acute Rehab Unit.    Assessment: Upon review of the medications and patient chart the following irregularities were found:     BP meds without hold parameters: isosorbide     Transitions of Care:   5/6 = POD#14 consider resuming apixaban if clinically appropriate given bleeding risks. Please note that thrombocytopenia continues. On 5/5 Platelets=75. Hgb stable.     Plan:   - On 5/6, consider resuming apixaban if clinically appropriate given bleeding risks. Please note that thrombocytopenia continues. On 5/5 Platelets=75. Hgb stable.   - please add BP hold parameters to isosorbide     Attending provider will be sent this note for review.  If there are any emergent issues noted above, pharmacist will contact provider directly by phone.      Pharmacy will periodically review the resident's medication regimen for any PRN medications not administered in > 72 hours and discontinue them. The pharmacist will discuss gradual dose reductions of psychopharmacologic medications with interdisciplinary team on a regular basis.    Please contact pharmacy if the above does not answer specific medication questions/concerns.    Background:  A pharmacist has reviewed all medications and pertinent medical history today.  Medications were reviewed for appropriate use and any irregularities found are listed with recommendations.      Current Facility-Administered Medications:     acetaminophen (TYLENOL) tablet 650 mg, 650 mg, Oral, Q4H PRN, Suzanne Purdy MD, 650 mg at 05/04/25 1736    artificial saliva (BIOTENE MT) solution 1 spray, 1 spray, Mouth/Throat, At Bedtime, Josefina Segovia MD, 1 spray at 05/04/25 7243    atorvastatin (LIPITOR) tablet 40 mg, 40 mg, Oral, Daily, Suzanne Purdy MD, 40 mg at 05/05/25 0759    azelastine (ASTELIN) nasal spray 2 spray, 2 spray, Both Nostrils, BID, Josefina Segovia MD, 2 spray at 05/05/25  0810    bisacodyl (DULCOLAX) suppository 10 mg, 10 mg, Rectal, Daily PRN, Suzanne Purdy MD    carboxymethylcellulose PF (REFRESH PLUS) 0.5 % ophthalmic solution 1 drop, 1 drop, Both Eyes, BID, Josefina Segovia MD, 1 drop at 05/05/25 0812    cyanocobalamin (VITAMIN B-12) tablet 1,000 mcg, 1,000 mcg, Oral, Daily, Suzanne Purdy MD, 1,000 mcg at 05/05/25 0800    dexAMETHasone (DECADRON) tablet 1 mg, 1 mg, Oral, Daily, Suzanne Purdy MD, 1 mg at 05/05/25 0759    glucose gel 15-30 g, 15-30 g, Oral, Q15 Min PRN **OR** dextrose 50 % injection 25-50 mL, 25-50 mL, Intravenous, Q15 Min PRN **OR** glucagon injection 1 mg, 1 mg, Subcutaneous, Q15 Min PRN, Suzanne Purdy MD    fenofibrate (LOFIBRA) tablet 54 mg, 54 mg, Oral, Daily with breakfast, Josefina Segovia MD, 54 mg at 05/05/25 0759    hydrALAZINE (APRESOLINE) tablet 10 mg, 10 mg, Oral, Q4H PRN, Suzanne Purdy MD    insulin aspart (NovoLOG) injection (RAPID ACTING), 1-7 Units, Subcutaneous, TID AC, Suzanne Purdy MD    insulin aspart (NovoLOG) injection (RAPID ACTING), 1-5 Units, Subcutaneous, At Bedtime, Suzanne Purdy MD    insulin glargine (LANTUS PEN) injection 20 Units, 20 Units, Subcutaneous, QAM AC, Suzanne Purdy MD, 20 Units at 05/05/25 0822    isosorbide mononitrate (IMDUR) 24 hr tablet 30 mg, 30 mg, Oral, Daily, Suzanne Purdy MD, 30 mg at 05/05/25 0800    levETIRAcetam (KEPPRA) tablet 1,500 mg, 1,500 mg, Oral, BID, Suzanne Purdy MD, 1,500 mg at 05/05/25 0809    levothyroxine (SYNTHROID/LEVOTHROID) tablet 125 mcg, 125 mcg, Oral, Daily, Suzanne Purdy MD, 125 mcg at 05/05/25 0759    LORazepam (ATIVAN) injection 2 mg, 2 mg, Intravenous, Q5 Min PRN, Suzanne Purdy MD    naloxone (NARCAN) injection 0.2 mg, 0.2 mg, Intravenous, Q2 Min PRN **OR** naloxone (NARCAN) injection 0.4 mg, 0.4 mg, Intravenous, Q2 Min PRN **OR** naloxone (NARCAN) injection 0.2 mg, 0.2 mg, Intramuscular, Q2 Min PRN **OR** naloxone (NARCAN) injection 0.4 mg, 0.4 mg, Intramuscular,  Q2 Min PRN, Josefina Segovia MD    ondansetron (ZOFRAN ODT) ODT tab 4 mg, 4 mg, Oral, Q6H PRN, Suzanne Purdy MD    oxyCODONE (ROXICODONE) tablet 5 mg, 5 mg, Oral, Q6H PRN **OR** oxyCODONE (ROXICODONE) tablet 10 mg, 10 mg, Oral, Q6H PRN, Josefina Segovia MD    polyethylene glycol (MIRALAX) Packet 17 g, 17 g, Oral, Daily, Suzanne Purdy MD    senna-docusate (SENOKOT-S/PERICOLACE) 8.6-50 MG per tablet 1 tablet, 1 tablet, Oral or Feeding Tube, Daily, Suzanne Purdy MD, 1 tablet at 05/05/25 0800  No current outpatient prescriptions on file.   PMH: high grade neuroendocrine tumor of L parotid gland s/p chemoradiation, a.fib on DOAC, CAD s/p CABG, CKD (baseline 1.1-1.3), DMT2, HLP, hypothyroidism

## 2025-05-05 NOTE — PROGRESS NOTES
"  [facility]   Acute Rehabilitation Unit  Daily progress note    INTERVAL HISTORY  Notes reviewed.  No acute events overnight.  This morning Pascual Perea has no concerns or complaints and says he is feeling \"better\".  He denies any headaches or shortness of breath, however says he is utilizing oxycodone to help him sleep due to generalized pain, and in particular the pain in the ribs from CPR.  Denies any problems with bowel or bladder and last bowel movement was today.    Current functional status:  PT:  Bed Mobility: Mark rolling, SBA sup<>sit  Transfer: SBA w/ FWW  Gait: Up to 250' w/ FWW, SBA  Stairs: 6\" x12, SBA w/ railing  Balance: Stable dynamic sitting and static standing. Requires UE support w/ dynamic stepping     Outcome Measures:   5TSTS   Worthy  10MWT     Assessment:  Pt presents below baseline s/p right frontotemporal craniotomy for resection of right temporal brain tumor on 4/22/2025. At baseline pt is IND w/ cane at baseline, lives w/ son and has IADL assist. Pt will likely need increased assist, SBA w/ mobility at discharge d/t impulsivity and cognitive impairments. Will need to follow up with family to see if they are able to provide. ELOS 7 days.     OT:  ADLs:  Mobility: CGA FWW  Grooming: SBA standing at sink  Dressing: TBA  Bathing: TBA  Toileting: CGA transfer FWW. CGA hygiene/clothing.  IADLs: PLOF IND simple meal prep, med admin, and driving. Family assisted laundry, grocery shopping, housekeeping at baseline.  Vision/Cognition: Mild deficits problem solving, attention, and awareness.     Assessment: Pt is an 84 yr old male admitted to IP ARU following hospitalization for brain mass now s/p R frontotemporal craniotomy and tumor resection. ADL/IADL performance is below baseline, impacted by impaired balance, impaired cognition, and activity precautions. Goals for SBA-Mod IND ADLs and mobility with consideration of medical complexity and cognitive " status. ELOS 7 days.       SLP:  Hearing: Bilateral hearing aids.  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  Slow processing speed and limited insight into impairments  Swallow: Regular texture diet and thin liquids     Assessment: Formal cognitive-linguistic assessment completed.  See progress note for further details        MEDICATIONS  Scheduled:  Current Facility-Administered Medications   Medication Dose Route Frequency Provider Last Rate Last Admin    artificial saliva (BIOTENE MT) solution 1 spray  1 spray Mouth/Throat At Bedtime Josefina Segovia MD   1 spray at 05/04/25 2106    atorvastatin (LIPITOR) tablet 40 mg  40 mg Oral Daily Suzanne Purdy MD   40 mg at 05/05/25 0759    azelastine (ASTELIN) nasal spray 2 spray  2 spray Both Nostrils BID Josefina Segovia MD   2 spray at 05/05/25 0810    carboxymethylcellulose PF (REFRESH PLUS) 0.5 % ophthalmic solution 1 drop  1 drop Both Eyes BID Josefina Segovia MD   1 drop at 05/05/25 0812    cyanocobalamin (VITAMIN B-12) tablet 1,000 mcg  1,000 mcg Oral Daily Suzanne Purdy MD   1,000 mcg at 05/05/25 0800    dexAMETHasone (DECADRON) tablet 1 mg  1 mg Oral Daily Suzanne Purdy MD   1 mg at 05/05/25 0759    fenofibrate (LOFIBRA) tablet 54 mg  54 mg Oral Daily with breakfast Josefina Segovia MD   54 mg at 05/05/25 0759    insulin aspart (NovoLOG) injection (RAPID ACTING)  1-7 Units Subcutaneous TID  Suzanne Purdy MD   2 Units at 05/05/25 1156    insulin aspart (NovoLOG) injection (RAPID ACTING)  1-5 Units Subcutaneous At Bedtime Suzanne Purdy MD        insulin glargine (LANTUS PEN) injection 20 Units  20 Units Subcutaneous QAM AC Suzanne Purdy MD   20 Units at 05/05/25 0822    isosorbide mononitrate (IMDUR) 24 hr tablet 30 mg  30 mg Oral Daily Suzanne Purdy MD   30 mg at 05/05/25 0800    levETIRAcetam (KEPPRA) tablet 1,500 mg  1,500 mg  Oral BID Suzanne Purdy MD   1,500 mg at 05/05/25 0809    levothyroxine (SYNTHROID/LEVOTHROID) tablet 125 mcg  125 mcg Oral Daily Suzanne Purdy MD   125 mcg at 05/05/25 0759    polyethylene glycol (MIRALAX) Packet 17 g  17 g Oral Daily Suzanne Purdy MD        senna-docusate (SENOKOT-S/PERICOLACE) 8.6-50 MG per tablet 1 tablet  1 tablet Oral or Feeding Tube Daily Suzanne Purdy MD   1 tablet at 05/05/25 0800        PRN:    Current Facility-Administered Medications   Medication Dose Route Frequency Provider Last Rate Last Admin    acetaminophen (TYLENOL) tablet 650 mg  650 mg Oral Q4H PRN Suzanne Purdy MD   650 mg at 05/04/25 1736    bisacodyl (DULCOLAX) suppository 10 mg  10 mg Rectal Daily PRN Suzanne Purdy MD        glucose gel 15-30 g  15-30 g Oral Q15 Min PRN Suzanne Purdy MD        Or    dextrose 50 % injection 25-50 mL  25-50 mL Intravenous Q15 Min PRN Suzanne Purdy MD        Or    glucagon injection 1 mg  1 mg Subcutaneous Q15 Min PRN Suzanne Purdy MD        hydrALAZINE (APRESOLINE) tablet 10 mg  10 mg Oral Q4H PRN Suzanne Purdy MD        LORazepam (ATIVAN) injection 2 mg  2 mg Intravenous Q5 Min PRN Suzanne Purdy MD        naloxone (NARCAN) injection 0.2 mg  0.2 mg Intravenous Q2 Min PRN Josefina Segovia MD        Or    naloxone (NARCAN) injection 0.4 mg  0.4 mg Intravenous Q2 Min PRN Josefina Segovia MD        Or    naloxone (NARCAN) injection 0.2 mg  0.2 mg Intramuscular Q2 Min PRN Josefina Segovia MD        Or    naloxone (NARCAN) injection 0.4 mg  0.4 mg Intramuscular Q2 Min PRN Josefina Segovia MD        ondansetron (ZOFRAN ODT) ODT tab 4 mg  4 mg Oral Q6H PRN Suzanne Purdy MD        oxyCODONE (ROXICODONE) tablet 5 mg  5 mg Oral Q6H PRN Josefina Segovia MD        Or    oxyCODONE (ROXICODONE) tablet 10 mg  10 mg Oral Q6H PRN Josefina Segovia MD              PHYSICAL EXAM  Patient Vitals for the past 24 hrs:   BP Temp Temp src Pulse Resp SpO2   05/05/25 1402 118/48  -- -- -- -- --   05/05/25 1340 135/49 -- -- 78 -- --   05/05/25 0757 111/56 -- -- 58 -- 99 %   05/05/25 0219 112/61 98  F (36.7  C) Oral 59 -- 98 %   05/04/25 1557 112/51 98.5  F (36.9  C) Oral 67 19 96 %       GEN: NAD, pleasant and cooperative  HEENT: R craniotomy incision healing well, c/d/I, no areas of dehiscence or signs of infection  CVS: RRR, S1+S2, no m/r/g  PULM: CTA b/l, no w/r/r  ABD: Soft, NT, ND, bowel sounds present  EXT: No LE edema or calf tenderness b/l  Neuro: AAOx3, Answers appropriately, follows commands    LABS  CBC RESULTS:   Recent Labs   Lab Test 05/05/25 0755   WBC 3.6*   RBC 3.14*   HGB 9.5*   HCT 28.4*   MCV 90   MCH 30.3   MCHC 33.5   RDW 15.9*   PLT 75*       Last Comprehensive Metabolic Panel:  Sodium   Date Value Ref Range Status   05/05/2025 138 135 - 145 mmol/L Final   07/01/2021 140 133 - 144 mmol/L Final     Potassium   Date Value Ref Range Status   05/05/2025 4.1 3.4 - 5.3 mmol/L Final   12/12/2022 4.3 3.4 - 5.3 mmol/L Final   07/01/2021 4.1 3.4 - 5.3 mmol/L Final     Potassium POCT   Date Value Ref Range Status   04/22/2025 3.7 3.4 - 5.3 mmol/L Final     Comment:     Chart Critical result. Doctor notified     Chloride   Date Value Ref Range Status   05/05/2025 103 98 - 107 mmol/L Final   12/12/2022 101 94 - 109 mmol/L Final   07/01/2021 106 94 - 109 mmol/L Final     Carbon Dioxide   Date Value Ref Range Status   07/01/2021 29 20 - 32 mmol/L Final     Carbon Dioxide (CO2)   Date Value Ref Range Status   05/05/2025 29 22 - 29 mmol/L Final   12/12/2022 30 20 - 32 mmol/L Final     Anion Gap   Date Value Ref Range Status   05/05/2025 6 (L) 7 - 15 mmol/L Final   12/12/2022 6 3 - 14 mmol/L Final   07/01/2021 5 3 - 14 mmol/L Final     Glucose   Date Value Ref Range Status   05/05/2025 92 70 - 99 mg/dL Final   12/12/2022 219 (H) 70 - 99 mg/dL Final   07/01/2021 208 (H) 70 - 99 mg/dL Final     GLUCOSE BY METER POCT   Date Value Ref Range Status   05/05/2025 214 (H) 70 - 99 mg/dL Final      Urea Nitrogen   Date Value Ref Range Status   05/05/2025 18.7 8.0 - 23.0 mg/dL Final   12/12/2022 25 7 - 30 mg/dL Final   07/01/2021 20 7 - 30 mg/dL Final     Creatinine   Date Value Ref Range Status   05/05/2025 1.09 0.67 - 1.17 mg/dL Final   07/01/2021 1.69 (H) 0.66 - 1.25 mg/dL Final     GFR Estimate   Date Value Ref Range Status   05/05/2025 67 >60 mL/min/1.73m2 Final     Comment:     eGFR calculated using 2021 CKD-EPI equation.   07/01/2021 38 (L) >60 mL/min/[1.73_m2] Final     Comment:     Non  GFR Calc  Starting 12/18/2018, serum creatinine based estimated GFR (eGFR) will be   calculated using the Chronic Kidney Disease Epidemiology Collaboration   (CKD-EPI) equation.       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 on 4/16/25 with lethargy, somnolence, word-finding/memory difficulties, imbalance on walking, and generalized lower extremity weakness. CT head with right temporal lobe mass with associated hemorrhagic aspect, significant surrounding vasogenic edema, and right to left midline shift, later confirmed on MRI. Patient is now s/p right frontotemporal craniotomy for resection of right temporal brain tumor on 4/22/2025.  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 Post operative course complicated by cardiopulmonary arrest in PACU requiring re-intubation and CPR with ROSC along with  seizure in PACU. Deficits include impaired activity tolerance, impaired balance, impaired judgement and safety awareness, impaired strength, impaired tone, and pain. Patient will require and benefit from PT and OT services as well as all of the ancillary services offered in the inpatient rehab setting.       Admission to acute inpatient rehab: 5/4/2025      Impairment group code: Brain Dysfunction 02.1 Non-Traumatic; metastatic recurrence of high grade neuroendocrine tumor.      PT and OT for 90 minutes of each on a daily basis, 6x a week, for 9 days in addition to rehab nursing and close management of physiatrist.     Impairment of ADL's:  OT for 90 min daily, 6x a week for 9 days to work on upper and lower body self care, dressing, toileting, bathing, energy conservation techniques with use of ADs as needed   Impairment of mobility:   PT for 90 min daily, 6x a week for 9 days to work on gait exercises, strengthening, endurance buildup, transfers with use of walker as needed   Rehab RN to administer medication, patient education on medication taking, VS monitoring, bowel regimen, glucose monitoring and wound care/surgical wound dressing changes and monitoring.      Medical Conditions  Brain mass with hemorrhage and vasogenic edema s/p R frontotemporal craniotomy and tumor resection (4/22/2025)  Encephalopathy due to brain mass/brain edema  High grade neuroendocrine tumor, large cell type, left parotid gland s/p chemoradiation   R Epileptiform Discharges on EEG  Patient presented on 4/16/2025 with lethargy, somnolence, word-finding/memory difficulties, imbalance with walking, and generalized lower extremity weakness. CT head with 5.3 x 4 x 3.8 cm right temporal lobe mass with associated hemorrhagic aspect, significant surrounding vasogenic edema, and right to left midline shift 6.6 mm. MRI brain with 5.3 x 3.8 x 4.3 cm right temporal lobe mass with extensive surrounding vasogenic edema, hemorrhagic signal within the  "mass, and right to left midline shift 4 to 5 mm. Pt is now s/p right frontotemporal craniotomy for resection of right temporal brain tumor on 4/22/2025.  Patient with history of neuroendocrine tumor, which was thought to be in remission (follows with [facility] (Dr. Butler)). 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 Neurosurgery, oncology, and neurology followed patient while in acute care. Please see cancer treatment history outlined by Dr. Bravo in his note from 4/23.  Essentially, he notes that after patient received a course of definitive chemoradiation therapy for left parotid small cell carcinoma, a PET CT on 12/23/2024 showed complete metabolic response with no evidence of residual disease. Per Dr. Bravo on 4/30, \"Given his limited disease extent and improved clinical status, I discussed recommendations for post-operative stereotactic radiation therapy to the surgical cavity and residual disease to 3000 cGy in 5 fractions.   - Per oncology: Brain MRI and CT simulation in 2-3 weeks pending his discharge from the hospital and rehab with initiation of post-operative stereotactic radiation therapy approximately 1 week later  - Neuro checks PRN  - Platelet goal > 75k.  Plts 75 on 5/5  - Currently on 2 week decadron taper-1mg Qday through 5/6, then stop  - Wound cares: May leave incision open to air, cover with light gauze if patient is picking at incision  - Pain regimen:              - Tylenol 650mg Q4H PRN              - Oxycodone 5-10mg Q6H PRN   - Keppra 1500mg BID per general neurology  - Goal SBP <150 - hydralazine PRN ordered  - May resume Eliquis for afib POD#14 (5/6), Will clarify with NSGY team regarding starting Eliquis in the setting of thrombocytopenia  - Craniotomy precautions  - Lorazepam 2mg IV PRN for seizure >2 minutes  - Zofran 4mg Q6H PRN     Moderate oral and pharyngeal dysphagia  Patient initially had NG tube in place secondary to dysphagia. He " "was followed by SLP in acute care. His diet was gradually advanced. On 4/28, patient was eating 100% of meals and tube feeds were tapered and discontinued. Feeding tube was removed on 4/29. On 5/1, patient advanced to regular diet with thin liquids. Prior to discharge from acute care, SLP signed off.   - Regular diet with thin liquids     Falls  Back pain, improving  RRT was called x2 on 4/28 for falls, not resulting in significant injury.  The first episode was unwitnessed. Patient reported that his left knee gave way and he \"sat down.\"  The second episode was assisted and patient was lowered back onto his bed.  Fall precautions were added. Patient briefly had bedside attendant, but this was discontinued on 4/29. On 4/30, patient reporting back pain. Neurosurgery ordered x-ray of lumbar spine which showed osteopenia, but otherwise unremarkable. Pain improving per patient.  - Fall precautions  - Continue PT/OT  - Up with assist only, including use of gait belt and walker  - Abdominal binder PRN for comfort     Coronary artery disease status post CABG  LVH  Mild troponin elevation  Postoperative hypotension, CODE BLUE activated  Troponin 42->45 on admission. Patient denied chest pain. EKG reviewed by cardiology initially due to ST elevations in setting of repolarization abnormality, but not felt to be ischemic. Likely secondary to brain mass/bleed. See postoperative note from Dr. Erickson, patient had \"hypotensive episode following extubation in the PACU to the point that he required activation of CODE BLUE.  Patient also required CPR\" with achievement of ROSC. BPs more well-controlled since.  - Continue PTA Imdur      Atrial fibrillation with slow ventricular response  On cardiac monitoring in acute care, which showed evidence of ongoing, asymptomatic bradycardia.  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 Creatinine 1.22 on admission to acute care.   - Avoid nephrotoxins  - BMP qM/Th  - Cr 1.09 on 5/5     Diabetes mellitus, type 2, on long term insulin   HgbA1c 6.9% 2/10/25. Increased to 7.9% on 4/23. Prior to admission on glargine 40 units BID and aspart 3 units per carb unit. Initially, patient was on insulin drip, which was discontinued on 4/28 with resumption of patient's PTA basal insulin and slightly lower dose of prandial insulin (1 unit per 15g of carbohydrate) with high dose sliding scale insulin. On 5/1, morning blood sugar reading was 57 mg/dL.  Patient ate all of his breakfast, with resolution of hypoglycemia.  Decreased from high correction scale to medium correction scale and decreased Lantus from 40 to 35 units twice daily. On 5/2, morning blood sugar reading was 62 mg/dl.  Patient asymptomatic.  Reduced Lantus to 30 units twice daily and reduced prandial NovoLog to 1 unit per 20 g of carbohydrate Hypoglycemia persisted on 5/3, most likely in setting of dexamethasone taper. Prandial NovoLog was discontinued and Lantus was reduced to 20 units BID. Prior to admission, patient's AM BG 66. Sliding scale insulin was discontinued and Lantus was decreased to 15mg qAM and 10mg QHS.  - Hypoglycemia protocol   - Switched Lantus from 15mg qAM and 10mg QHS to 20mg qAM (eliminate evening dose given low AM blood sugars and long-acting nature of Lantus)  - Medium dose SSI     Hyperlipidemia  - Continue PTA atorvastatin and fenofibrate      Hypothyroidism  - Continue PTA levothyroxine      Osteopenia  Evidenced on 4/30 L-spine XR.  - PCP to assess fracture risk and need for further work-up/treatment on discharge     Vitamin B12 deficiency  - Continue cyanocobalamin 1000mcg Qday     Dry mouth  - Artifical saliva throat spray QHS  (substituted for PTA ACT dry mouth lozenge)     Allergies  - Continue PTA azelastine 2 sprays BID and artifical tears 1 drop BID (substituted for PTA polyethylene glycol drops)    Thrombocytopenia, chronic  -Likely chemotherapy induced, overall stable  -Per discharge summary from hospitalization 10/2024, cleared to resume Eliquis when plts >50K  -Per NSGY recommendation, keep Plts >75K.   -Plts 75 on 5/5, re-check in AM and plan to routinely check qM/Th     Adjustment to disability: Clinical psychology to eval and treat if indicated   FEN: Regular diet with thin liquids  Bladder: Continent  Bowel: Miralax Qday, senna-docusate 1 tablet Qday, PRN bowel meds available  DVT Prophylaxis: Mechanical, can resume Apixaban on 5/6 pending discussion with NSGY as above  GI Prophylaxis: N/A  Code: Full   Disposition: Goal for home  ELOS/Discharge Date:  7 days  Rehab prognosis:  Good   Follow up Appointments on Discharge:   - Outpatient PCP  - Outpatient PM&R  - Outpatient therapies  - Oncology with brain MRI and CT simulation in 2-3 weeks pending his discharge from the hospital and rehab with initiation of post-operative stereotactic radiation therapy approximately 1 week later  - Neurosurgery  - Neurology in 6-8 weeks          Juan Segovia MD  Department of Rehabilitation Medicine      Time Spent on this Encounter   I spent a total of 55 minutes face-to-face or managing the care of Pascual Perea. Over 50% of my time on the unit was spent counseling the patient and coordinating care. See note for details.

## 2025-05-05 NOTE — PLAN OF CARE
Discharge Planner Post-Acute Rehab OT:     Discharge Plan: Home with family support    Precautions: Falls, craniotomy, [place] - hearing aids    Current Status:  ADLs:  Mobility: CGA FWW  Grooming: SBA standing at sink  Dressing: TBA  Bathing: TBA  Toileting: CGA transfer FWW. CGA hygiene/clothing.  IADLs: PLOF IND simple meal prep, med admin, and driving. Family assisted laundry, grocery shopping, housekeeping at baseline.  Vision/Cognition: Mild deficits problem solving, attention, and awareness.    Assessment: Pt is an 84 yr old male admitted to IP ARU following hospitalization for brain mass now s/p R frontotemporal craniotomy and tumor resection. ADL/IADL performance is below baseline, impacted by impaired balance, impaired cognition, and activity precautions. Goals for SBA-Mod IND ADLs and mobility with consideration of medical complexity and cognitive status. ELOS 7 days.     Other Barriers to Discharge (DME, Family Training, etc):   Caregiver support: Lives with son. Son works outside of the home. Receives IADL support at baseline.  Equipment: Owns a cane and shower chair

## 2025-05-05 NOTE — PLAN OF CARE
Patient alert and oriented. Slept through this shift. No SOB or chest pain voiced. Denied pain or any discomfort. Voiding adequately without concern. Right crani incision TYRELL, scattered bruising to right and left forearms. Vitals stable. Appears adjusting well to the unit, Utilizing the urinal at bedside. Call appropriately. Safety measures in place and call light within reach. Care plan continues.

## 2025-05-05 NOTE — CONSULTS
Social Work: Initial Assessment with Discharge Plan    Patient Name: Pascual Perea  : 1940  Age: 84 year old  MRN: [id number]  Completed assessment with: Chart review and interview with patient   Admitted to ARU: 2025    Presenting Information   Date of SW assessment: May 5, 2025  Health Care Directive: Will bring in copy  Primary Health Care Agent: Patient/self   Secondary Health Care Agent: NOK, adult children  Living Situation: Lives in a senior living apartment with son.   Previous Functional Status: Patient was getting around the apartment with a walker or cane, son Harpreet and his sister did most of the cooking, cleaning and shopping. Harpreet was planning on moving out and understands that patient may not be able to live independently going forward.   DME available: See therapy evaluation for more information   Patient and family understanding of hospitalization: Appropriate  Cultural/Language/Spiritual Considerations: 84 year old  male, English speaking, , and Buddhist carmelo. Per pt, deaf out of right ear.    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 CT head with 5.3 x 4 x 3.8 cm right temporal lobe mass with associated hemorrhagic aspect, significant surrounding vasogenic edema, right to left midline shift 6.6 mm. MRI brain with 5.3 x 3.8 x 4.3 cm right temporal lobe mass with extensive surrounding vasogenic edema, hemorrhagic signal within the mass, right to left midline shift 4 to 5 mm. Pt is now s/p right frontotemporal craniotomy for  "resection of right temporal brain tumor. DOS: 2025.  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 Post operative course complicated by cardiopulmonary arrest in PACU requiring re-intubation and CPR with ROSC. He also had a seizure in PACU.     Provider Information   Primary Care Physician:Andrew Elizalde   [facility] will schedule PCP apt at discharge.   : None reported.    Mental Health/Chemical Dependency:   Diagnosis: Pt reports mood was \"up and down\" during hospitalization, but has improved now that he is at ARU. Motivated to get home.   Alcohol/Tobacco/Narcotis: No current ETOH or tobacco use.  Support/Services in Place: None reported  Services Needed/Recommended: [place] [facility] support while on ARU available.   Sexuality/Intimacy: Not discussed     Support System  Marital Status: , wife  2 years ago  Family support: Adult son, Harpreet, lives with pt  Other support available: Daughter lives nearby and helps with some IADLs. Pt also has more children out of state    Community Resources  Current in home services: Pt denies  Previous services: None reported    Financial/Employment/Education  Employment Status: Retired   Income Source: Social Security  Education: [employer]  Financial Concerns:  Pt denies  Insurance: [facility] MEDICARE    Discharge Plan   Patient and family discharge goal: TBD, pending progress  Provided Education on discharge plan: Evaluations and discharge recommendations pending.   Patient agreeable to discharge plan:  Pending further discussion. Evaluations and discharge recommendations pending.   Provided education and attained signature for Medicare IM and IRF Patient Rights and Privacy Information provided to patient : YES  Provided patient with [facility] Resources: N/A  Barriers to discharge: TBD    Discharge Recommendations   Disposition: See above   Transportation Needs: Patient, " "family/friends, paid transport, insurance transport (if applicable)     Additional comments   Discharge OSCAR, CATHY 7 days    SW will remain available and continue to follow as needs arise.       -------------------------------------------------------------------------------------------------------------  SOBEIDA Pain Assessment    Pain Effect on Sleep  Over the past 5 days, how much of the time has pain made it hard for you to sleep at night?\"    2. Occasionally    Pain Interference with Therapy Activities  \"Over the past 5 days, how often have you limited your participation in rehabilitation therapy sessions due to pain?\"  1. Rarely or not at all    Pain Interference with Day-to-Day Activities  \"Over the past 5 days, how often have you limited your day-to-day activities (excluding rehabilitation therapy sessions) because of pain?\"  1. Rarely or not at all    -------------------------------------------------------------------------------------------------------------    Tigist Marr [facility], Acute Inpatient Rehab Unit   [address] [place] [address], [address] [place] [address]  Phone: [phone], Fax: [phone]  "

## 2025-05-05 NOTE — PROGRESS NOTES
"   05/05/25 1306   Appointment Info   Signing Clinician's Name / Credentials (PT) Isabel Rice DPT   Rehab Comments (PT) -15 toileting   Living Environment   People in Home child(sharita), adult   Current Living Arrangements apartment   Transportation Anticipated family or friend will provide   Living Environment Comments Pt lives in an apartment, stairs and elevator access available \"I usually go down the stairs and up the elevator\". Bathroom includes in a tubshower with a chair and grab bars. Bedroom - standard bed no rails. Son works full time outside of the home, already assists IADLs. Sometimes pt would just use railing to amb down hallway to get mail.   Self-Care   Usual Activity Tolerance good   Current Activity Tolerance poor   Equipment Currently Used at Home cane, straight   Fall history within last six months yes   Number of times patient has fallen within last six months 2   Activity/Exercise/Self-Care Comment No formal exercise program. Recent falls occured in the hospital \"sudden loss of balance\". Owns a cane and shower chair.   Post-Acute Assessment Only   Post-Acute Functional Assessment See below   Previous Level of Function/Home Environm   Bathing, Previous Functional Level independent   Grooming, Previous Functional Level independent   Dressing, Previous Functional Level independent   Eating/Feeding, Previous Functional Level independent   Toileting, Previous Functional Level independent   BADLs, Previous Functional Level independent   IADLs, Previous Functional Level partial assistance   Bed Mobility, Previous Functional Level independent   Transfers, Previous Functional Level independent   Household Ambulation, Previous Functional Level uses device or equipment  (cane)   Stairs, Previous Functional Level   (IND going down stairs, took elevator up to apartment)   Community Ambulation, Previous Functional Level   (Could walk \"half a block\")   Functional Cognition, Previous Functional Level No " concers, was managing medications and still driving   Previous Level of Function IND/Mod IND ADL, some family support for heavier/more complex IADLs, still driving/managing owns medications   Diet/Method of Nutritional Intake, Previous Level thin liquids;regular diet   General Information   Onset of Illness/Injury or Date of Surgery 04/16/25   Referring Physician Dr Juan Segovia MD   Patient/Family Therapy Goals Statement (PT) Home before relative's wedding   Pertinent History of Current Problem (include personal factors and/or comorbidities that impact the POC) Pascual Perea is a 84 year old right hand dominant male with past medical history of 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 CT head with right temporal lobe mass with associated hemorrhagic aspect, significant surrounding vasogenic edema, and right to left midline shift, later confirmed on MRI. Patient is now s/p right frontotemporal craniotomy for resection of right temporal brain tumor on 4/22/2025.  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 Post operative course complicated by cardiopulmonary arrest in PACU requiring re-intubation and CPR with ROSC along with seizure in PACU. Deficits include impaired activity tolerance, impaired balance, impaired judgement and safety awareness, impaired strength, impaired tone, and pain. Patient will require and benefit from PT and OT services as well as all of the ancillary services offered in the inpatient rehab setting.   Existing Precautions/Restrictions fall  (crani, impulsive)   General Observations [place], has jaren HAs. Reports worsening R hearing  "since surgery \"deaf\", no L hearing deficits. Wears glasses for reading. Often needing repetition d/t this. See flowsheet for orhtostatics, no dizziness/lightheadedness. Vears to R with ambulation, able to correct with cueing.   Cognition   Cognitive Status Comments Able to make needs known. Differing information between pt and chart. I.e. Pt stating he has only had one fall at home falling out of bed (does not report falls in hospital), stating he does not live with son. Verbalizes need to slow down with movements.   Pain Assessment   Patient Currently in Pain No   Integumentary/Edema   Integumentary/Edema Comments R crani incision open to air, no s/s of infection. No LE edema.   Posture    Posture Comments Forward head posture, rounded shoulders.   Range of Motion (ROM)   ROM Comment WFL   Strength (Manual Muscle Testing)   Strength Comments L hip flexion 3+/5, knee ext 5/5, knee flex 4/5, ankle DF 3+/5, PF 5/5. R LE grossly 4+/5>5/5 except for hip flexion 3+/5. Able to withstand moderate resistance to seated hip abd/add, symmetrical bilaterally.   Balance   Balance Comments Stable dynamic sitting. Stands statically unsupported w/o LOB or postural sway. One L LOB requiring Andrés from therapist to stabilize with head turns.   Sensory Examination   Sensory Perception Comments Baseline sensory deficits over past few years \"bottoms feel like leather\". Absent protective sensation jaren. Light touch diminished R>L. Vibration intact RLE and L great toe, absent at L medial malleolus. Sharp/dull impaired BLE ~50%.Kinesthetic awareness intact.   Coordination   Coordination Comments Heel to shin symmetrical and intact. Mild incoordination LLE>UE w/ [place], foot [place].   Muscle Tone   Muscle Tone Comments Not formally assessed, no functional deficits observed.   Clinical Impression   Criteria for Skilled Therapeutic Intervention Yes, treatment indicated   PT Diagnosis (PT) Force production deficit, movement pattern coordination " deficit.   Influenced by the following impairments Mild impulsivity, cognitive impairment, baseline LE sensory deficits, L>R sided weakness and incoordination, decreased activity tolerance   Functional limitations due to impairments Impaired gait and mobility   Clinical Presentation (PT Evaluation Complexity) evolving   Clinical Presentation Rationale >2 personal factors impacting plan of care and pt presentation.   Clinical Decision Making (Complexity) moderate complexity   Planned Therapy Interventions (PT) balance training;bed mobility training;gait training;home exercise program;neuromuscular re-education;patient/family education;stair training;strengthening;transfer training   Risk & Benefits of therapy have been explained evaluation/treatment results reviewed;care plan/treatment goals reviewed;risks/benefits reviewed;current/potential barriers reviewed;participants voiced agreement with care plan;participants included;patient   Clinical Impression Comments Pt presents below baseline s/p right frontotemporal craniotomy for resection of right temporal brain tumor on 4/22/2025. At baseline pt is IND w/ cane at baseline, lives w/ son and has IADL assist. Pt will likely need increased assist, SBA w/ mobility at discharge d/t impulsivity and cognitive impairments. Will need to follow up with family to see if they are able to provide. ELOS 7 days.   PT Total Evaluation Time   PT Eval, Moderate Complexity Minutes (79782) 25   Physical Therapy Goals   PT Frequency 6x/week   PT Predicted Duration/Target Date for Goal Attainment 05/11/25   PT Goals Bed Mobility;Transfers;Gait;Stairs;PT Goal 1;PT Goal 2;PT Goal 3   PT: Bed Mobility Independent   PT: Transfers Supervision/stand-by assist   PT: Gait Supervision/stand-by assist  (Cane vs FWW)   PT: Goal 1 Car transfer w/ SBA FWW vs SPC   PT: Goal 2 Pt and family will indpendently verbalize 3 fall prevention strategies following education.   Interventions   Interventions  Quick Adds Therapeutic Activity   Therapeutic Activity   Therapeutic Activities: dynamic activities to improve functional performance Minutes (36692) 20   Treatment Detail/Skilled Intervention See GG. Time for toileting at beginning of session, SBA for transfer, LBD, and pericares. Reinforced use of call light and waiting for staff, pt agreeable. Trial of hurrycane on R side, slower pace and x1 LOB w/ device compared to FWW. Recommending FWW w/ nsg Ax1.   PT Discharge Planning   PT Plan 5TSTS, 10MWT. GG pickup, uneven, curb.   Physical Therapy Time and Intention   Timed Code Treatment Minutes 20   Total Session Time (sum of timed and untimed services) 45   Post Acute Settings Only   What unit is patient on? Acute Rehab   PT - Acute Rehab Center Time   Individual Time (minutes) - PT 45   Group Time (minutes) - PT 0   Concurrent Time (minutes) - PT 0   Co-Treatment Time (minutes) - PT 0   ARC Total Session Time (minutes) - PT 45   ARC Daily Total Session Time   PT ARC Daily Total Session Time 45   ARC Daily Rehab Total Minutes 105   Chair/bed-to-chair Transfer: standard height (18 inches)   Describe performance SBA w/ FWW   Roll Left and Right: flat no rail   Assistance Needed Adaptive equipment   Roll Left to Right CARE Score 6   Sit to Lying: flat no rail   Assistance Needed Adaptive equipment;Supervision   Sit to Lying CARE Score 4   Lying to Sitting on Side of Bed: flat no rail   Assistance Needed Adaptive equipment;Supervision   Lying to Sitting CARE Score 4   Sit to Stand: standard height (18 inches)   Assistance Needed Adaptive equipment;Supervision   Sitting to Standing CARE Score 4   Locomotion   Locomotion Comment Amb w/ FWW total ~600' throughout session CGA>SBA.   Walk 10 Feet   Assistance Needed Adaptive equipment;Supervision   Walk 10 Ft. CARE Score 4   Walk 50 Feet with Two Turns   Patient Performance Adaptive equipment;Supervision   Walk 50 Ft. CARE Score 4   Walk 150 Feet   Assistance Needed Adaptive  "equipment   Physical Assistance Level Contact guard assist   Walk 150 Ft. CARE Score 4   Wheel 50 Feet with Two Turns   Reason if not Attempted Activity not applicable   Wheel 50 Feet with Two Turns CARE Score 9   Wheel 150 Feet   Reason if not Attempted Activity not applicable   Wheel 150 Feet CARE Score 9   Stairs   Stairs Comment 6\" x12 w/ BUEs on [place], varying step through and step to pattern. SBA   4 Steps: 6 inches   Assistance Needed Adaptive equipment   Physical Assistance Level Contact guard assist   4 Steps CARE Score 4   12 Steps: 6 inches   Assistance Needed Adaptive equipment   Physical Assistance Level Contact guard assist   12 Steps CARE Score 4   Car Transfer: Ability to transfer in/out of car or van   Assistance Needed Adaptive equipment;Supervision   Car Transfer CARE Score 4     "

## 2025-05-05 NOTE — PLAN OF CARE
Goal Outcome Evaluation:      Plan of Care Reviewed With: patient    Overall Patient Progress: no changeOverall Patient Progress: no change     ..Orientation: A&O x4  Bowel: Continent  LBM: 5/4 per patient report.  Bladder:Continent, ambulates to bathroom.  Pain: No complaints of pain  Ambulation/Transfers: Assist of 1 with walker and gait belt.  Diet/Liquids:Regular diet, thin liquids  Tubes/Lines/Drains: None  Skin: Right craniotomy, sutures TYRELL. Facial bruising.  Other: Hard of hearing, wears hearing aids and charging station is on bedside table. BG checks. SCDs on at bedtime.

## 2025-05-06 ENCOUNTER — APPOINTMENT (OUTPATIENT)
Dept: SPEECH THERAPY | Facility: CLINIC | Age: 85
DRG: 843 | End: 2025-05-06
Attending: PHYSICAL MEDICINE & REHABILITATION
Payer: COMMERCIAL

## 2025-05-06 ENCOUNTER — TELEPHONE (OUTPATIENT)
Dept: NEUROSURGERY | Facility: CLINIC | Age: 85
End: 2025-05-06

## 2025-05-06 ENCOUNTER — APPOINTMENT (OUTPATIENT)
Dept: OCCUPATIONAL THERAPY | Facility: CLINIC | Age: 85
DRG: 843 | End: 2025-05-06
Attending: PHYSICAL MEDICINE & REHABILITATION
Payer: COMMERCIAL

## 2025-05-06 ENCOUNTER — APPOINTMENT (OUTPATIENT)
Dept: PHYSICAL THERAPY | Facility: CLINIC | Age: 85
DRG: 843 | End: 2025-05-06
Attending: PHYSICAL MEDICINE & REHABILITATION
Payer: COMMERCIAL

## 2025-05-06 LAB
ERYTHROCYTE [DISTWIDTH] IN BLOOD BY AUTOMATED COUNT: 15.9 % (ref 10–15)
GLUCOSE BLDC GLUCOMTR-MCNC: 113 MG/DL (ref 70–99)
GLUCOSE BLDC GLUCOMTR-MCNC: 120 MG/DL (ref 70–99)
GLUCOSE BLDC GLUCOMTR-MCNC: 185 MG/DL (ref 70–99)
GLUCOSE BLDC GLUCOMTR-MCNC: 201 MG/DL (ref 70–99)
GLUCOSE BLDC GLUCOMTR-MCNC: 205 MG/DL (ref 70–99)
GLUCOSE BLDC GLUCOMTR-MCNC: 318 MG/DL (ref 70–99)
HCT VFR BLD AUTO: 30.2 % (ref 40–53)
HGB BLD-MCNC: 10 G/DL (ref 13.3–17.7)
HOLD SPECIMEN: NORMAL
MCH RBC QN AUTO: 29.7 PG (ref 26.5–33)
MCHC RBC AUTO-ENTMCNC: 33.1 G/DL (ref 31.5–36.5)
MCV RBC AUTO: 90 FL (ref 78–100)
PLATELET # BLD AUTO: 68 10E3/UL (ref 150–450)
RBC # BLD AUTO: 3.37 10E6/UL (ref 4.4–5.9)
WBC # BLD AUTO: 4.4 10E3/UL (ref 4–11)

## 2025-05-06 PROCEDURE — 250N000013 HC RX MED GY IP 250 OP 250 PS 637

## 2025-05-06 PROCEDURE — 250N000013 HC RX MED GY IP 250 OP 250 PS 637: Performed by: PHYSICAL MEDICINE & REHABILITATION

## 2025-05-06 PROCEDURE — 97535 SELF CARE MNGMENT TRAINING: CPT | Mod: GO | Performed by: OCCUPATIONAL THERAPIST

## 2025-05-06 PROCEDURE — 97530 THERAPEUTIC ACTIVITIES: CPT | Mod: GP

## 2025-05-06 PROCEDURE — 99232 SBSQ HOSP IP/OBS MODERATE 35: CPT

## 2025-05-06 PROCEDURE — 128N000003 HC R&B REHAB

## 2025-05-06 PROCEDURE — 97750 PHYSICAL PERFORMANCE TEST: CPT | Mod: GP

## 2025-05-06 PROCEDURE — 36415 COLL VENOUS BLD VENIPUNCTURE: CPT | Performed by: PHYSICAL MEDICINE & REHABILITATION

## 2025-05-06 PROCEDURE — 97129 THER IVNTJ 1ST 15 MIN: CPT | Mod: GN

## 2025-05-06 PROCEDURE — 85014 HEMATOCRIT: CPT | Performed by: PHYSICAL MEDICINE & REHABILITATION

## 2025-05-06 PROCEDURE — 97130 THER IVNTJ EA ADDL 15 MIN: CPT | Mod: GN

## 2025-05-06 PROCEDURE — 250N000012 HC RX MED GY IP 250 OP 636 PS 637

## 2025-05-06 PROCEDURE — 97129 THER IVNTJ 1ST 15 MIN: CPT | Mod: GO | Performed by: OCCUPATIONAL THERAPIST

## 2025-05-06 PROCEDURE — 97130 THER IVNTJ EA ADDL 15 MIN: CPT | Mod: GO | Performed by: OCCUPATIONAL THERAPIST

## 2025-05-06 RX ADMIN — AZELASTINE 2 SPRAY: 1 SPRAY, METERED NASAL at 08:01

## 2025-05-06 RX ADMIN — CYANOCOBALAMIN TAB 1000 MCG 1000 MCG: 1000 TAB at 07:59

## 2025-05-06 RX ADMIN — AZELASTINE 2 SPRAY: 1 SPRAY, METERED NASAL at 21:09

## 2025-05-06 RX ADMIN — DEXAMETHASONE 1 MG: 1 TABLET ORAL at 07:59

## 2025-05-06 RX ADMIN — LEVOTHYROXINE SODIUM 125 MCG: 125 TABLET ORAL at 07:59

## 2025-05-06 RX ADMIN — CARBOXYMETHYLCELLULOSE SODIUM 1 DROP: 5 SOLUTION/ DROPS OPHTHALMIC at 08:09

## 2025-05-06 RX ADMIN — CARBOXYMETHYLCELLULOSE SODIUM 1 DROP: 5 SOLUTION/ DROPS OPHTHALMIC at 21:08

## 2025-05-06 RX ADMIN — LEVETIRACETAM 1500 MG: 750 TABLET, FILM COATED ORAL at 08:09

## 2025-05-06 RX ADMIN — ACETAMINOPHEN 650 MG: 325 TABLET ORAL at 21:14

## 2025-05-06 RX ADMIN — ATORVASTATIN CALCIUM 40 MG: 40 TABLET, FILM COATED ORAL at 07:59

## 2025-05-06 RX ADMIN — Medication 1 SPRAY: at 21:09

## 2025-05-06 RX ADMIN — FENOFIBRATE 54 MG: 54 TABLET ORAL at 07:59

## 2025-05-06 RX ADMIN — INSULIN GLARGINE 20 UNITS: 100 INJECTION, SOLUTION SUBCUTANEOUS at 08:00

## 2025-05-06 RX ADMIN — POLYETHYLENE GLYCOL 3350 17 G: 17 POWDER, FOR SOLUTION ORAL at 07:59

## 2025-05-06 RX ADMIN — INSULIN ASPART 2 UNITS: 100 INJECTION, SOLUTION INTRAVENOUS; SUBCUTANEOUS at 17:56

## 2025-05-06 RX ADMIN — ISOSORBIDE MONONITRATE 30 MG: 30 TABLET, EXTENDED RELEASE ORAL at 07:59

## 2025-05-06 RX ADMIN — LEVETIRACETAM 1500 MG: 750 TABLET, FILM COATED ORAL at 21:08

## 2025-05-06 RX ADMIN — SENNOSIDES AND DOCUSATE SODIUM 1 TABLET: 50; 8.6 TABLET ORAL at 07:59

## 2025-05-06 RX ADMIN — INSULIN ASPART 1 UNITS: 100 INJECTION, SOLUTION INTRAVENOUS; SUBCUTANEOUS at 13:11

## 2025-05-06 ASSESSMENT — ACTIVITIES OF DAILY LIVING (ADL)
ADLS_ACUITY_SCORE: 53
ADLS_ACUITY_SCORE: 53
ADLS_ACUITY_SCORE: 49
ADLS_ACUITY_SCORE: 53
ADLS_ACUITY_SCORE: 49
ADLS_ACUITY_SCORE: 53
ADLS_ACUITY_SCORE: 49
ADLS_ACUITY_SCORE: 53
ADLS_ACUITY_SCORE: 49
ADLS_ACUITY_SCORE: 53
ADLS_ACUITY_SCORE: 49
ADLS_ACUITY_SCORE: 53
ADLS_ACUITY_SCORE: 53
ADLS_ACUITY_SCORE: 49
ADLS_ACUITY_SCORE: 53
ADLS_ACUITY_SCORE: 49
ADLS_ACUITY_SCORE: 49
ADLS_ACUITY_SCORE: 53

## 2025-05-06 NOTE — PROGRESS NOTES
"  [facility]   Acute Rehabilitation Unit  Daily progress note  Admitted: 5/4/2025  ARU day: 2  Plan for homecare on discharge.        INTERVAL HISTORY  Pascual Perea was seen and examined at bedside this morning.  Nursing notes, any pertinent labs and all vitals were reviewed by me. No acute events reported overnight. Dayday has no systemic complaints at this time, continues to use oxycodone PRN night time for pain with good relief. Denies subjective fever, chills, sore throat, rhinorrhea, CP, SOB, PEREZ, constipation, diarrhea, dysuria. Reports appetite is \"good\" and water intake is appropriate. Seen while eating lunch, states therapies are going well, would like to get home as soon as he can.  We discussed starting anticoagulation, and that I wanted to hear from NSGY at [facility] prior to starting - he was agreeable with this plan.          PRNs used in the last 24hs:   None         Functional status:    PT: Stable dynamic sitting. Stands statically unsupported w/o LOB or postural sway. One L LOB requiring Andrés from therapist to stabilize with head turns.   OT: Initial OT evaluation completed and plan of care established. Provided education on role of OT in IP ARU, goals, and plan of care. Initial assessment of ADL/mobility with FWW.   SLP: Reviewed with patient scores from RBANS that was completed on 5/5. Engaged in TOMER subtests as treatment. Telling of time on clock with 80% accuracy. Patient able to recognize why in error once pointed out but similar error was repeated later in the task.         MEDICATIONS  Current Facility-Administered Medications   Medication Dose Route Frequency Provider Last Rate Last Admin    artificial saliva (BIOTENE MT) solution 1 spray  1 spray Mouth/Throat At Bedtime Josefina Segovia MD   1 spray at 05/05/25 2110    atorvastatin (LIPITOR) tablet 40 mg  40 mg Oral Daily Suzanne Purdy MD   40 mg at 05/06/25 8636    azelastine " (ASTELIN) nasal spray 2 spray  2 spray Both Nostrils BID Josefina Segovia MD   2 spray at 05/06/25 0801    carboxymethylcellulose PF (REFRESH PLUS) 0.5 % ophthalmic solution 1 drop  1 drop Both Eyes BID Josefina Segovia MD   1 drop at 05/06/25 0809    cyanocobalamin (VITAMIN B-12) tablet 1,000 mcg  1,000 mcg Oral Daily Suzanne Purdy MD   1,000 mcg at 05/06/25 0759    fenofibrate (LOFIBRA) tablet 54 mg  54 mg Oral Daily with breakfast Josefina Segovia MD   54 mg at 05/06/25 0759    insulin aspart (NovoLOG) injection (RAPID ACTING)  1-7 Units Subcutaneous TID AC Suzanne Purdy MD   1 Units at 05/05/25 1752    insulin aspart (NovoLOG) injection (RAPID ACTING)  1-5 Units Subcutaneous At Bedtime Suzanne Purdy MD        insulin glargine (LANTUS PEN) injection 20 Units  20 Units Subcutaneous QAM AC Suzanne Purdy MD   20 Units at 05/06/25 0800    isosorbide mononitrate (IMDUR) 24 hr tablet 30 mg  30 mg Oral Daily Josefina Segovia MD   30 mg at 05/06/25 0759    levETIRAcetam (KEPPRA) tablet 1,500 mg  1,500 mg Oral BID Suzanne Purdy MD   1,500 mg at 05/06/25 0809    levothyroxine (SYNTHROID/LEVOTHROID) tablet 125 mcg  125 mcg Oral Daily Suzanne Purdy MD   125 mcg at 05/06/25 0759    polyethylene glycol (MIRALAX) Packet 17 g  17 g Oral Daily Suzanne Purdy MD   17 g at 05/06/25 0759    senna-docusate (SENOKOT-S/PERICOLACE) 8.6-50 MG per tablet 1 tablet  1 tablet Oral or Feeding Tube Daily Suzanne Purdy MD   1 tablet at 05/06/25 0759          Current Facility-Administered Medications   Medication Dose Route Frequency Provider Last Rate Last Admin    acetaminophen (TYLENOL) tablet 650 mg  650 mg Oral Q4H PRN Suzanne Purdy MD   650 mg at 05/04/25 1736    bisacodyl (DULCOLAX) suppository 10 mg  10 mg Rectal Daily PRN Suzanne Purdy MD        glucose gel 15-30 g  15-30 g Oral Q15 Min PRN Suzanne Purdy MD        Or    dextrose 50 % injection 25-50 mL  25-50 mL Intravenous Q15 Min PRN Suzanne Purdy MD      "   Or    glucagon injection 1 mg  1 mg Subcutaneous Q15 Min PRN Suzanne Purdy MD        hydrALAZINE (APRESOLINE) tablet 10 mg  10 mg Oral Q4H PRN Suzanne Purdy MD        LORazepam (ATIVAN) injection 2 mg  2 mg Intravenous Q5 Min PRN Suzanne Purdy MD        naloxone (NARCAN) injection 0.2 mg  0.2 mg Intravenous Q2 Min PRN Josefina Segovia MD        Or    naloxone (NARCAN) injection 0.4 mg  0.4 mg Intravenous Q2 Min PRN Josefina Segovia MD        Or    naloxone (NARCAN) injection 0.2 mg  0.2 mg Intramuscular Q2 Min PRN Josefina Segovia MD        Or    naloxone (NARCAN) injection 0.4 mg  0.4 mg Intramuscular Q2 Min PRN Josefina Segovia MD        ondansetron (ZOFRAN ODT) ODT tab 4 mg  4 mg Oral Q6H PRN Suzanne Purdy MD        oxyCODONE (ROXICODONE) tablet 5 mg  5 mg Oral Q6H PRN Josefina Segovia MD        Or    oxyCODONE (ROXICODONE) tablet 10 mg  10 mg Oral Q6H PRN Josefina Segovia MD                  PHYSICAL EXAM  BP (!) 147/57 (BP Location: Left arm, Patient Position: Semi-Luna's, Cuff Size: Adult Regular)   Pulse 68   Temp 98.1  F (36.7  C) (Oral)   Resp 16   Ht 1.803 m (5' 11\")   Wt 79.4 kg (175 lb 1.6 oz)   SpO2 99%   BMI 24.42 kg/m    Gen: Awake alert NAD  HEENT: Mucus membranes moist, grossly intact. Right craniotomy incision, edges approximated no drainage  Cardio: RRR, no clicks, rubs, gallops or murmurs appreciated   Pulm: CTAB, no rales, wheezes or cough appreciated   Abd: Soft, non tender, BS + all quads   Ext: Warm, well perfused, no edema noted  Neuro/MSK: oriented, answering questions appropriately, engaged in conversation.         LABS  CBC RESULTS:   Recent Labs   Lab Test 05/06/25  0635 05/05/25  0755 05/03/25  1136   WBC 4.4 3.6* 4.6   RBC 3.37* 3.14* 3.01*   HGB 10.0* 9.5* 9.3*   HCT 30.2* 28.4* 26.9*   MCV 90 90 89   MCH 29.7 30.3 30.9   MCHC 33.1 33.5 34.6   RDW 15.9* 15.9* 15.7*   PLT 68* 75* 78*       Last Basic Metabolic Panel:  Recent Labs   Lab Test " 05/06/25  0759 05/06/25  0214 05/05/25  2124 05/05/25  1148 05/05/25  0755 05/03/25  1144 05/03/25  1136 05/02/25  1126 05/02/25  0734   NA  --   --   --   --  138  --  139  --  140   POTASSIUM  --   --   --   --  4.1  --  4.0  --  4.1   CHLORIDE  --   --   --   --  103  --  102  --  104   CO2  --   --   --   --  29  --  28  --  26   ANIONGAP  --   --   --   --  6*  --  9  --  10   * 120* 171*   < > 92   < > 89   < > 62*   BUN  --   --   --   --  18.7  --  19.1  --  16.6   CR  --   --   --   --  1.09  --  0.97  --  0.87   GFRESTIMATED  --   --   --   --  67  --  77  --  86   STARR  --   --   --   --  9.4  --  9.1  --  9.2    < > = values in this interval not displayed.       CBC RESULTS:   Recent Labs   Lab Test 05/06/25  0635 05/05/25  0755 05/03/25  1136   WBC 4.4 3.6* 4.6   RBC 3.37* 3.14* 3.01*   HGB 10.0* 9.5* 9.3*   HCT 30.2* 28.4* 26.9*   MCV 90 90 89   MCH 29.7 30.3 30.9   MCHC 33.1 33.5 34.6   RDW 15.9* 15.9* 15.7*   PLT 68* 75* 78*       Imaging  No results found for this visit on 05/04/25.       Rehabilitation - continue comprehensive acute inpatient rehabilitation program with multidisciplinary approach including therapies, rehab nursing, and physiatry following. See interval history for updates.          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 CT head with right temporal lobe mass with associated hemorrhagic aspect, significant surrounding vasogenic edema, and right to left midline shift, later confirmed on MRI. Patient is now s/p right frontotemporal craniotomy  for resection of right temporal brain tumor on 4/22/2025. 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 Post operative course complicated by cardiopulmonary arrest in PACU requiring re-intubation and CPR with ROSC along with seizure in PACU. Deficits include impaired activity tolerance, impaired balance, impaired judgement and safety awareness, impaired strength, impaired tone, and pain. Patient will require and benefit from PT and OT services as well as all of the ancillary services offered in the inpatient rehab setting.     Brain mass with hemorrhage and vasogenic edema s/p R frontotemporal craniotomy and tumor resection (4/22/2025)  Encephalopathy due to brain mass/brain edema  High grade neuroendocrine tumor, large cell type, left parotid gland s/p chemoradiation   R Epileptiform Discharges on EEG  Patient presented on 4/16/2025 with lethargy, somnolence, word-finding/memory difficulties, imbalance with walking, and generalized lower extremity weakness. CT head with 5.3 x 4 x 3.8 cm right temporal lobe mass with associated hemorrhagic aspect, significant surrounding vasogenic edema, and right to left midline shift 6.6 mm. MRI brain with 5.3 x 3.8 x 4.3 cm right temporal lobe mass with extensive surrounding vasogenic edema, hemorrhagic signal within the mass, and right to left midline shift 4 to 5 mm. Pt is now s/p right frontotemporal craniotomy for resection of right temporal brain tumor on 4/22/2025.  Patient with history of neuroendocrine tumor, which was thought to be in remission (follows with [facility] (Dr. Butler)). 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 Neurosurgery, oncology, and neurology followed patient while in acute care. Please see cancer treatment history outlined by Dr. Bravo in his note from 4/23.  Essentially, he notes that after patient received a course of definitive chemoradiation therapy for left parotid  "small cell carcinoma, a PET CT on 12/23/2024 showed complete metabolic response with no evidence of residual disease. Per Dr. Bravo on 4/30, \"Given his limited disease extent and improved clinical status, I discussed recommendations for post-operative stereotactic radiation therapy to the surgical cavity and residual disease to 3000 cGy in 5 fractions.   - Per oncology: Brain MRI and CT simulation in 2-3 weeks pending his discharge from the hospital and rehab with initiation of post-operative stereotactic radiation therapy approximately 1 week later  - Neuro checks PRN  - Platelet goal > 75k.  Plts 75 on 5/5, -->68 on 5/6, recheck with BMP on Thurs  - Currently on 2 week decadron taper-1mg Qday through 5/6, then stop  - Wound cares: May leave incision open to air, cover with light gauze if patient is picking at incision  - Pain regimen:              - Tylenol 650mg Q4H PRN              - Oxycodone 5-10mg Q6H PRN   - Keppra 1500mg BID per general neurology  - Goal SBP <150 - hydralazine PRN ordered  - May resume Eliquis for afib POD#14 (5/6), Will clarify with NSGY team regarding starting Eliquis in the setting of thrombocytopenia   - Re-paged 5/6, awaiting callback from [facility] Re: Platelet goal (transfusion guidelines?) and DOAC initiation   - Craniotomy precautions  - Lorazepam 2mg IV PRN for seizure >2 minutes  - Zofran 4mg Q6H PRN     Moderate oral and pharyngeal dysphagia  Patient initially had NG tube in place secondary to dysphagia. He was followed by SLP in acute care. His diet was gradually advanced. On 4/28, patient was eating 100% of meals and tube feeds were tapered and discontinued. Feeding tube was removed on 4/29. On 5/1, patient advanced to regular diet with thin liquids. Prior to discharge from acute care, SLP signed off.   - Regular diet with thin liquids     Falls  Back pain, improving  RRT was called x2 on 4/28 for falls, not resulting in significant injury.  The first episode was " "unwitnessed. Patient reported that his left knee gave way and he \"sat down.\"  The second episode was assisted and patient was lowered back onto his bed.  Fall precautions were added. Patient briefly had bedside attendant, but this was discontinued on 4/29. On 4/30, patient reporting back pain. Neurosurgery ordered x-ray of lumbar spine which showed osteopenia, but otherwise unremarkable. Pain improving per patient.  - Fall precautions  - Continue PT/OT  - Up with assist only, including use of gait belt and walker  - Abdominal binder PRN for comfort     Coronary artery disease status post CABG  LVH  Mild troponin elevation  Postoperative hypotension, CODE BLUE activated  Troponin 42->45 on admission. Patient denied chest pain. EKG reviewed by cardiology initially due to ST elevations in setting of repolarization abnormality, but not felt to be ischemic. Likely secondary to brain mass/bleed. See postoperative note from Dr. Erickson, patient had \"hypotensive episode following extubation in the PACU to the point that he required activation of CODE BLUE.  Patient also required CPR\" with achievement of ROSC. BPs more well-controlled since.  - Continue PTA Imdur      Atrial fibrillation with slow ventricular response  On cardiac monitoring in acute care, which showed evidence of ongoing, asymptomatic bradycardia.  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 Creatinine 1.22 on admission to acute care.   - Avoid nephrotoxins  - BMP qM/Th  - Cr 1.09 on 5/5     Diabetes mellitus, type 2, on long term insulin   HgbA1c 6.9% 2/10/25. Increased to 7.9% on 4/23. Prior to admission on glargine 40 units BID and aspart 3 units per carb unit. " Initially, patient was on insulin drip, which was discontinued on 4/28 with resumption of patient's PTA basal insulin and slightly lower dose of prandial insulin (1 unit per 15g of carbohydrate) with high dose sliding scale insulin. On 5/1, morning blood sugar reading was 57 mg/dL.  Patient ate all of his breakfast, with resolution of hypoglycemia.  Decreased from high correction scale to medium correction scale and decreased Lantus from 40 to 35 units twice daily. On 5/2, morning blood sugar reading was 62 mg/dl.  Patient asymptomatic.  Reduced Lantus to 30 units twice daily and reduced prandial NovoLog to 1 unit per 20 g of carbohydrate Hypoglycemia persisted on 5/3, most likely in setting of dexamethasone taper. Prandial NovoLog was discontinued and Lantus was reduced to 20 units BID. Prior to admission, patient's AM BG 66. Sliding scale insulin was discontinued and Lantus was decreased to 15mg qAM and 10mg QHS.  - Hypoglycemia protocol   - Switched Lantus from 15mg qAM and 10mg QHS to 20mg qAM (eliminate evening dose given low AM blood sugars and long-acting nature of Lantus)  - Medium dose SSI     Hyperlipidemia  - Continue PTA atorvastatin and fenofibrate      Hypothyroidism  - Continue PTA levothyroxine      Osteopenia  Evidenced on 4/30 L-spine XR.  - PCP to assess fracture risk and need for further work-up/treatment on discharge     Vitamin B12 deficiency  - Continue cyanocobalamin 1000mcg Qday     Dry mouth  - Artifical saliva throat spray QHS (substituted for PTA ACT dry mouth lozenge)     Allergies  - Continue PTA azelastine 2 sprays BID and artifical tears 1 drop BID (substituted for PTA polyethylene glycol drops)     Thrombocytopenia, chronic  -Likely chemotherapy induced, overall stable  -Per discharge summary from hospitalization 10/2024, cleared to resume Eliquis when plts >50K  -Per NSGY recommendation, keep Plts >75K.   -Plts 75 on 5/5, re-check in AM and plan to routinely check qM/Th      Adjustment to disability: Clinical psychology to eval and treat if indicated   FEN: Regular diet with thin liquids  Bladder: Continent  Bowel: Miralax Qday, senna-docusate 1 tablet Qday, PRN bowel meds available  DVT Prophylaxis: Mechanical, can resume Apixaban on 5/6 pending discussion with NSGY as above  GI Prophylaxis: N/A  Code: Full   Disposition: Goal for home  ELOS/Discharge Date:  7 days  Rehab prognosis:  Good   Follow up Appointments on Discharge:   - Outpatient PCP  - Outpatient PM&R  - Outpatient therapies  - Oncology with brain MRI and CT simulation in 2-3 weeks pending his discharge from the hospital and rehab with initiation of post-operative stereotactic radiation therapy approximately 1 week later  - Neurosurgery  - Neurology in 6-8 weeks    40 minutes spent on the date of service doing chart review, history and exam, documentation, and further activities as noted above.           Patient was discussed with Dr. Segovia, PM&R Staff Physician    JAKI Gonsalves CNP  Physical Medicine & Rehabilitation

## 2025-05-06 NOTE — PLAN OF CARE
Discharge Planner Post-Acute Rehab OT:     Discharge Plan: Home with family support    Precautions: Falls, craniotomy, Kiana - hearing aids    Current Status:  ADLs:  Mobility: SBA FWW  Grooming: SBA standing at sink  Dressing: UB Set up. LB CGA.   Bathing: CGA extended tub bench.  Toileting: SBA transfer FWW. CGA hygiene/clothing management.  IADLs: PLOF IND simple meal prep, med admin, and driving. Family assisted laundry, grocery shopping, housekeeping at baseline.  Vision/Cognition: Mild deficits problem solving, attention, and awareness. [place] cognitive screen showing deficits with executive functioning and visuospatial skills.     Assessment: Completed shower with ADL routine, ambulatory with FWW, mostly SBA but intermittent CGA for fall prevention. Administered [place] Cognitive Screen indicating deficits with executive functioning and visuospatial attention.     Other Barriers to Discharge (DME, Family Training, etc):   Caregiver support: Lives with son. Son works outside of the home. Receives IADL support at baseline.  Equipment: Owns a cane and shower chair

## 2025-05-06 NOTE — PLAN OF CARE
Five Times Sit to Stand Test: (FTSTST): The FTSTST measures functional LE strength and provides information about postural control during transitions to/from standing.     Patient Score: 38.3 seconds    Performance is considered within normal limits for times:  <10 seconds for people aged < 60 years with balance and vestibular disorders  <12 seconds community-dwelling adults, and chronic stroke  <14.2 seconds for people aged > 60 years with balance and vestibular disorders  >15 seconds for community-dwelling elderly individuals in their 80s has been correlated with an increased likelihood of a prior history of falls.   <16 seconds for individuals with Parkinson's disease    Minimal Detectable Change = 2.5 sec  Minimal Detectable Change = 8.4 sec (sit <> stand x 10 rep) (hemodialysis)   according to Cooper, Maureen & Messi 2009    Assessment (rationale for performing, application to patient s function & care plan): Performed to assess LE strength and postural control during transfers. Patient scores 38.3 seconds.

## 2025-05-06 NOTE — DISCHARGE SUMMARY
DISCHARGE SUMMARY   Patient ID:   Pascual Perea   [id number]   84 year old   1940     Admit date: 4/22/2025     Discharge date: 05/04/2025    Admission Diagnoses: Brain mass [G93.89]  S/P craniotomy [Z98.890]     Procedure:     1.  Right frontoparietal craniotomy for resection of right temporal brain tumor.  2. Use of [employer] intra-operative neuro-navigation with MRI guidance   3. Use of intraoperative microscope.     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    Cerebral edema with mass effect.      Surgeon: Dr. Erickson    Disposition: ARU    Pascual Perea verbalizes understanding and is in agreement with discharge.     Done while pt still in hospital bed      Review of your medicines        START taking        Dose / Directions   oxyCODONE 5 MG tablet  Commonly known as: ROXICODONE  Used for: Brain mass, S/P craniotomy      Dose: 5 mg  Take 1 tablet (5 mg) by mouth every 6 hours as needed for moderate pain.  Quantity: 30 tablet  Refills: 0     senna-docusate 8.6-50 MG tablet  Commonly known as: SENOKOT-S/PERICOLACE  Used for: Brain mass, S/P craniotomy      Dose: 1 tablet  Take 1 tablet by mouth or Feeding Tube 2 times daily as needed for constipation.  Quantity: 28 tablet  Refills: 1            CONTINUE these medicines which may have CHANGED, or have new prescriptions. If we are uncertain of the size of tablets/capsules you have at home, strength may be listed as something that might have changed.        Dose / Directions   azelastine 137 MCG/SPRAY Soln  This may have changed:   when to take this  reasons to take this  Used for: Chronic rhinitis      Dose: 2 spray  Spray 2 sprays into both nostrils 2 times daily.  Quantity: 30 mL  Refills: 1     dexAMETHasone 1 MG tablet  Commonly known as: DECADRON  This may have changed:   medication strength  See the new instructions.  Used for: Brain mass, S/P craniotomy      Start taking on: May 3, 2025  Take 2 tablets (2  mg) by mouth daily for 1 day, THEN 1 tablet (1 mg) daily for 3 days.  Quantity: 5 tablet  Refills: 0     Lantus SoloStar 100 UNIT/ML soln  This may have changed: how much to take  Used for: Type 2 diabetes mellitus with stage 3b chronic kidney disease, with long-term current use of insulin (H)  Generic drug: insulin glargine      Dose: 20 Units  Inject 20 Units subcutaneously 2 times daily.  Refills: 0     levETIRAcetam 750 MG tablet  Commonly known as: KEPPRA  This may have changed:   medication strength  how much to take  Used for: Brain mass      Dose: 1,500 mg  Take 2 tablets (1,500 mg) by mouth 2 times daily.  Refills: 0            CONTINUE these medicines which have NOT CHANGED        Dose / Directions   Accu-Chek Rima Plus test strip  Used for: Type 2 diabetes mellitus without complication, with long-term current use of insulin (H)  Generic drug: blood glucose      USE TO TEST BLOOD SUGAR 3 TIMES A DAY OR AS DIRECTED  Quantity: 100 strip  Refills: 2     ACT Dry Mouth Lozg      Dose: 1 lozenge  Take 1 lozenge by mouth at bedtime.  Refills: 0     atorvastatin 40 MG tablet  Commonly known as: LIPITOR  Used for: Hyperlipidemia LDL goal <70      Dose: 40 mg  TAKE 1 TABLET (40 MG) BY MOUTH DAILY  Quantity: 90 tablet  Refills: 2     Blink Tears 0.25 % Soln ophthalmic solution  Generic drug: polyethylene glycol 400      Dose: 1 drop  Place 1 drop into both eyes daily.  Refills: 0     blood glucose calibration solution  Used for: Type 2 diabetes mellitus without complication, with long-term current use of insulin (H)      USE AS DIRECTED  Quantity: 1 each  Refills: 0     chlorhexidine 4 % solution  Commonly known as: HIBICLENS  Used for: Activities involving personal hygiene      Apply topically See Admin Instructions. The night before surgery, take shower as you normally would. Then apply 1/2 bottle as body wash from neck down, avoid groin, let sit for 1 minute, and rinse off. Repeat the morning of  surgery.  Quantity: 236 mL  Refills: 0     cyanocobalamin 1000 MCG tablet  Commonly known as: VITAMIN B-12      Dose: 1,000 mcg  Take 1 tablet (1,000 mcg) by mouth daily.  Refills: 0     fenofibrate micronized 67 MG capsule  Commonly known as: LOFIBRA  Used for: Hyperlipidemia LDL goal <70      TAKE 1 CAPSULE BY MOUTH EVERY MORNING BEFORE BREAKFAST  Quantity: 90 capsule  Refills: 3     FreeStyle Linda 2 [place] Karrie  Used for: Type 2 diabetes mellitus without complication, with long-term current use of insulin (H)      Dose: 1 Device  1 Device continuously.  Quantity: 1 each  Refills: 1     FreeStyle Linda 2 Sensor Misc  Used for: Type 2 diabetes mellitus without complication, with long-term current use of insulin (H)      APPLY 1 SENSOR AND CHANGE EVERY 14 DAYS AS DIRECTED  Quantity: 6 each  Refills: 3     Global Ease Inject Pen Needles 31G X 5 MM miscellaneous  Used for: Type 2 diabetes mellitus without complication, with long-term current use of insulin (H)  Generic drug: insulin pen needle      Use one pen needle 5 times a day  Quantity: 500 each  Refills: 3     isosorbide mononitrate 30 MG 24 hr tablet  Commonly known as: IMDUR  Used for: Atherosclerosis of native coronary artery of native heart with angina pectoris      Dose: 30 mg  Take 1 tablet (30 mg) by mouth daily  Quantity: 90 tablet  Refills: 3     levothyroxine 125 MCG tablet  Commonly known as: SYNTHROID/LEVOTHROID  Used for: Hypothyroidism, unspecified type      Dose: 125 mcg  Take 1 tablet (125 mcg) by mouth daily  Quantity: 90 tablet  Refills: 3            STOP taking      Insulin Aspart FlexPen 100 UNIT/ML Sopn                  Where to get your medicines        These medications were sent to [facility] - [place], [place] - [address] [address]  [address] [address] [facility] [address]      Phone: [phone]   dexAMETHasone 1 MG tablet  senna-docusate 8.6-50 MG tablet       Some of these will need a paper prescription and  others can be bought over the counter. Ask your nurse if you have questions.    Bring a paper prescription for each of these medications  oxyCODONE 5 MG tablet          Discharge Procedure Orders   Adult Neurology  Referral   Standing Status: Future   Referral Priority: Routine: Next available opening Referral Type: Consultation   Number of Visits Requested: 1     General info for SNF   Order Comments: Length of Stay Estimate: Short Term Care: Estimated # of Days <30  Condition at Discharge: Improving  Level of care:skilled   Rehabilitation Potential: Good  Admission H&P remains valid and up-to-date: Yes  Recent Chemotherapy: N/A  Use Nursing Home Standing Orders: Yes     Mantoux instructions   Order Comments: Give two-step Mantoux (PPD) Per Facility Policy Yes     Follow Up and recommended labs and tests   Order Comments: Your Neurosurgical follow-up appointments have been scheduled. You may call [phone] to make, confirm or change your appointment dates and/or times.     Reason for your hospital stay   Order Comments: Right frontotemporal craniotomy.  Pathology revealed metastatic small cell carcinoma.     Wound care (specify)   Order Comments: Instructions to care for your wound at home: ice to area for comfort, keep wound clean and dry but you may get the incision wet in the shower. Do not soak or scrub the incision.      Please avoid lotions / creams and leave your incision open to air.     Activity - Up ad ellen   Order Comments: Please limit your lifting to no more that ten pounds and avoid excessive jostling and jarring activities.     Order Specific Question Answer Comments   Is discharge order? Yes      Glucose monitor nursing POCT   Order Comments: Before meals and at bedtime     Physical Therapy Adult Consult   Order Comments: Evaluate and treat as clinically indicated.    Reason:  mobility     Occupational Therapy Adult Consult   Order Comments: Evaluate and treat as clinically  indicated.    Reason:  ADL's     Fall precautions     Diet   Order Comments: Follow this diet upon discharge: Advance to a regular diet as tolerated.     Order Specific Question Answer Comments   Is discharge order? Yes           Respectfully,   MARCIO Delarosa, PA-C

## 2025-05-06 NOTE — PLAN OF CARE
"Discharge Plan: home w/ son     Precautions: falls, crani, mild impulsivity    Current Status:  Bed Mobility: Mark rolling, SBA sup<>sit  Transfer: SBA w/ FWW  Gait: Up to 250' w/ FWW, SBA  Stairs: 6\" x12, SBA w/ railing  Balance: Stable dynamic sitting and static standing. Requires UE support w/ dynamic stepping    Outcome Measures:   5TSTS: 38.3  Worthy  10MWT: 0.44m/s comfortable pace, CGA with FWW    Assessment: Time lost due to patient wanting to eat breakfast and for toileting. Performed 5TSTS and 10 MWT, see scores above.    Other Barriers to Discharge (DME, Family Training, etc):   Will need to confirm family support.       "

## 2025-05-06 NOTE — PLAN OF CARE
Discharge Planner Post-Acute Rehab SLP:     Discharge Plan: Home with family support and ongoing therapy    Precautions: Craniotomy, impulsivity    Current Status:  Hearing: Bilateral hearing aids.  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  Slow processing speed and limited insight into impairments  Swallow: Regular texture diet and thin liquids    Assessment: Reviewed with patient scores from [facility]ANS that was completed on 5/5. Engaged in TOMER subtests as treatment. Telling of time on clock with 80% accuracy. Patient able to recognize why in error once pointed out but similar error was repeated later in the task. Counting of money, patient completed with 60% accuracy. Multiple errors were due to poor manual dexterity resulting in unintentionally pulling out multiple coins. When given opportunity to review, did not recognize the error.     Other Barriers to Discharge (Family Training, etc): Ensure adequate level of support for higher level IADLs

## 2025-05-06 NOTE — PLAN OF CARE
"Goal Outcome Evaluation:      Plan of Care Reviewed With: patient    Overall Patient Progress: improving     Pt is calm, cooperative, pleasant, able to make his needs known, has call light within reach, and uses it appropriately.     Pt is hard of hearing but has his hearing aids in; the right side hearing is far worse since surgery.     VS: BP (!) 147/57 (BP Location: Left arm, Patient Position: Semi-Luna's, Cuff Size: Adult Regular)   Pulse 68   Temp 98.1  F (36.7  C) (Oral)   Resp 16   Ht 1.803 m (5' 11\")   Wt 79.4 kg (175 lb 1.6 oz)   SpO2 99%   BMI 24.42 kg/m     O2: Room air   Output: Continent x 2   Last BM: 5/5/25   Activity: Assist x 1 w/ GB & Walker   Up for meals? Good appetite; Encourage to sit upright for meals   Skin: Surgical incision right side of scalp; otherwise, Intact   Pain: Denies pain   CMS: A&O x 4; Stable gait w/ walker; Denies numbness/tingling in extremities   Dressing: None   Diet: Regular; Thin; Pills whole   LDA: None   Plan: Continue POC   Additional Info:          "

## 2025-05-06 NOTE — PLAN OF CARE
"10 Meter Walk Test:  Setup - using open 10 meter walkway. Can be setup individually or using marks along base board of East hallway between PT and OT gyms.  Instructions - comfortable/self chosen pace: \"Walk at your own comfortable walking pace and stop when you reach the end,\"   and fast pace: \"Walk as fast as you can safely walk and stop when you reach the end.\"   Time was started as the lead foot crossed the 2 meter july and finished as the patient's lead foot crossed the 8 meter july.    Assistive Device: FWW  Assist Provided: CGA    Average Speed for Comfortable Pace - 0.44m/s      Assessment: Performed to assess gait speed. Patient demonstrates a comfortable gait speed of 0.44m/s.      Normative Data:    Spinal Cord Injury -   MDC - 0.13 m/s (Jaswant et al, 2008)  MCID - 0.06 m/s (Iza et al, 2007)  0.13 m/s (Jaswant et al, 2008)    Gait Speed for Community Dwellers - Nikhil et al, 2005  Low Gait Speed - <0.7 m/sec  Mean Gait Speed - 0.7-1.0 m/sec  High Functioning Gait Speed - >1.1 m/sec     "

## 2025-05-06 NOTE — TELEPHONE ENCOUNTER
[facility] Call Center    Phone Message    May a detailed message be left on voicemail: yes     Reason for Call: Other: Please cancel nurse visit for Pt as he is still in the hospital.     Action Taken: Message routed to:  Other: [facility]    Travel Screening: Not Applicable     Date of Service:

## 2025-05-06 NOTE — PLAN OF CARE
Goal Outcome Evaluation:      Plan of Care Reviewed With: patient    Overall Patient Progress: no changeOverall Patient Progress: no change    FOCUS/GOAL     Bowel management, Bladder management, Medication management, Wound care management, Medical management, Mobility, Skin integrity, and Safety management     ASSESSMENT, INTERVENTIONS AND CONTINUING PLAN FOR GOAL:     Pt is A&OX4, calm, & cooperative with care. Forgetful & [place] with hearing aid. Denied CP, SOB, & n/v. Pt transfers A of 1 with walker & GB. Continent for both B&B. Takes meds whole with thin liquid. R scalp incision TYRELL with sutures in place. Pt slept well for most of the shift & no other acute concern. Able to make needs known & call light within reach. Continue with plan of care.

## 2025-05-06 NOTE — PROGRESS NOTES
"CLINICAL NUTRITION SERVICES - ASSESSMENT NOTE    RECOMMENDATIONS FOR MDs/PROVIDERS TO ORDER:  None at this time    Registered Dietitian Interventions:  Nutrition education: discussed importance of adequate intakes. Discussed available snacks/supplements. Encouraged preferred foods from outside hospital as able.    Future/Additional Recommendations:  Monitor PO intake, labs, and weight trends     REASON FOR ASSESSMENT  Provider order - recent feeding tube, monitor PO intake    INFORMATION OBTAINED  Assessed patient in room.    NUTRITION HISTORY  Per chart review, pt followed by RD during hospitalization. FT was removed 4/29. Pt was ordering 3 adequate meals/day from room service and consuming 100%.    Pt reports good appetite and eating well. He has been consuming 100% of meal trays. He does not feel like he needs any snacks/supplements at this time.     CURRENT NUTRITION ORDERS  Diet: Regular  - Room service with assist    CURRENT INTAKE/TOLERANCE  100% per flowsheets this admission. Pt ordered 2079 kcals and 84 g protein on 5/5 from room service.    LABS  Nutrition-relevant labs:   Hemoglobin A1C: 7.9 (4/23)  Glucose POCT:  over 24 hours    MEDICATIONS  Nutrition-relevant medications:   Vitamin B-12  Decadron  Fenofibrate  Novolog, medium intensity sliding scale  Lantus, 20 units every morning  Miralax  Senna-docusate    ANTHROPOMETRICS  Height: 180.3 cm (5' 11\")  Admission Weight: 79.4 kg (175 lb 1.6 oz) (05/04/25 1219)   Most Recent Weight: 79.4 kg (175 lb 1.6 oz) (05/04/25 1219)  IBW: 78.2 kg  BMI: Body mass index is 24.42 kg/m .   Weight History:   Wt Readings from Last 15 Encounters:   05/04/25 79.4 kg (175 lb 1.6 oz)   04/26/25 83.4 kg (183 lb 13.8 oz)   04/16/25 83.9 kg (185 lb)   04/09/25 86.2 kg (190 lb)   02/17/25 86.2 kg (190 lb)   02/14/25 87.8 kg (193 lb 9.6 oz)   01/02/25 85.7 kg (189 lb)   12/27/24 83 kg (183 lb)   12/24/24 86.1 kg (189 lb 13.1 oz)   12/09/24 88.6 kg (195 lb 6.4 oz)   11/13/24 " 84.4 kg (186 lb)   11/04/24 80.3 kg (177 lb)   10/30/24 78.5 kg (173 lb)   10/16/24 85.7 kg (189 lb)   10/16/24 85.8 kg (189 lb 3.2 oz)   Pt reports UBW of ~185 lbs  4.8% wt loss in ~1 week    Dosing Weight: 79 kg, based on admission wt    ASSESSED NUTRITION NEEDS  Estimated Energy Needs: 9733-1360 kcals/day (25 - 30 kcals/kg)  Justification: Maintenance  Estimated Protein Needs:  grams protein/day (1.2 - 1.5 grams of pro/kg)  Justification: Increased needs and Post-op  Estimated Fluid Needs: 1 mL/kcal  Justification: Maintenance    SYSTEM AND PHYSICAL FINDINGS    GI symptoms:  LBM 5/5 per RN note  Skin/wounds:  no pressure injuries noted, incision on scalp    MALNUTRITION  % Intake: Decreased intake does not meet criteria  % Weight Loss: > 2% in 1 week (severe)   Subcutaneous Fat Loss: None observed  Muscle Loss: None observed  Fluid Accumulation/Edema: None noted  Malnutrition Diagnosis: Patient does not meet two of the established criteria necessary for diagnosing malnutrition but is at risk for malnutrition  Malnutrition Present on Admission: No    NUTRITION DIAGNOSIS  Predicted inadequate nutrient intake (kcal/pro)  related to potential for variation in intake and menu fatigue.    INTERVENTIONS  See nutrition interventions above    GOALS  Patient to consume % of nutritionally adequate meal trays TID, or the equivalent with supplements/snacks.     MONITORING/EVALUATION  Progress toward goals will be monitored and evaluated per policy.     Sylvia Fajardo RD, CAROL  Available via phone and Vocera  Phone: [phone]  Vocera: 5R Acute Rehab Clinical Dietitian  Weekend/Holiday Vocera: Weekend Holiday Clinical Dietitian [Multi Site Groups]

## 2025-05-06 NOTE — PLAN OF CARE
Goal Outcome Evaluation:      Plan of Care Reviewed With: patient    Overall Patient Progress: no changeOverall Patient Progress: no change  Patient is alert and oriented x 4 can be forgetful. [place] bilateral hearing aids utilized. A-1 walker. Regular/thin whole. VS and BG monitored. Continent of B/B last BM 5/5. No care concern t this time. Call light is with in reach alarm is on.

## 2025-05-07 ENCOUNTER — APPOINTMENT (OUTPATIENT)
Dept: SPEECH THERAPY | Facility: CLINIC | Age: 85
DRG: 843 | End: 2025-05-07
Attending: PHYSICAL MEDICINE & REHABILITATION
Payer: COMMERCIAL

## 2025-05-07 ENCOUNTER — APPOINTMENT (OUTPATIENT)
Dept: PHYSICAL THERAPY | Facility: CLINIC | Age: 85
DRG: 843 | End: 2025-05-07
Attending: PHYSICAL MEDICINE & REHABILITATION
Payer: COMMERCIAL

## 2025-05-07 ENCOUNTER — TELEPHONE (OUTPATIENT)
Dept: NEUROSURGERY | Facility: CLINIC | Age: 85
End: 2025-05-07
Payer: COMMERCIAL

## 2025-05-07 ENCOUNTER — APPOINTMENT (OUTPATIENT)
Dept: OCCUPATIONAL THERAPY | Facility: CLINIC | Age: 85
DRG: 843 | End: 2025-05-07
Attending: PHYSICAL MEDICINE & REHABILITATION
Payer: COMMERCIAL

## 2025-05-07 DIAGNOSIS — C7A.1 HIGH GRADE NEUROENDOCRINE CARCINOMA (H): Primary | ICD-10-CM

## 2025-05-07 LAB
GLUCOSE BLDC GLUCOMTR-MCNC: 152 MG/DL (ref 70–99)
GLUCOSE BLDC GLUCOMTR-MCNC: 157 MG/DL (ref 70–99)
GLUCOSE BLDC GLUCOMTR-MCNC: 166 MG/DL (ref 70–99)
GLUCOSE BLDC GLUCOMTR-MCNC: 207 MG/DL (ref 70–99)
GLUCOSE BLDC GLUCOMTR-MCNC: 210 MG/DL (ref 70–99)

## 2025-05-07 PROCEDURE — 97535 SELF CARE MNGMENT TRAINING: CPT | Mod: GO

## 2025-05-07 PROCEDURE — 250N000013 HC RX MED GY IP 250 OP 250 PS 637: Performed by: PHYSICAL MEDICINE & REHABILITATION

## 2025-05-07 PROCEDURE — 128N000003 HC R&B REHAB

## 2025-05-07 PROCEDURE — 97130 THER IVNTJ EA ADDL 15 MIN: CPT | Mod: GN | Performed by: SPEECH-LANGUAGE PATHOLOGIST

## 2025-05-07 PROCEDURE — 97530 THERAPEUTIC ACTIVITIES: CPT | Mod: GP

## 2025-05-07 PROCEDURE — 250N000013 HC RX MED GY IP 250 OP 250 PS 637

## 2025-05-07 PROCEDURE — 97129 THER IVNTJ 1ST 15 MIN: CPT | Mod: GN | Performed by: SPEECH-LANGUAGE PATHOLOGIST

## 2025-05-07 PROCEDURE — 99233 SBSQ HOSP IP/OBS HIGH 50: CPT | Performed by: PHYSICAL MEDICINE & REHABILITATION

## 2025-05-07 RX ADMIN — AZELASTINE 2 SPRAY: 1 SPRAY, METERED NASAL at 21:59

## 2025-05-07 RX ADMIN — INSULIN ASPART 2 UNITS: 100 INJECTION, SOLUTION INTRAVENOUS; SUBCUTANEOUS at 12:13

## 2025-05-07 RX ADMIN — ATORVASTATIN CALCIUM 40 MG: 40 TABLET, FILM COATED ORAL at 08:36

## 2025-05-07 RX ADMIN — INSULIN GLARGINE 20 UNITS: 100 INJECTION, SOLUTION SUBCUTANEOUS at 08:35

## 2025-05-07 RX ADMIN — SENNOSIDES AND DOCUSATE SODIUM 1 TABLET: 50; 8.6 TABLET ORAL at 08:37

## 2025-05-07 RX ADMIN — CARBOXYMETHYLCELLULOSE SODIUM 1 DROP: 5 SOLUTION/ DROPS OPHTHALMIC at 21:58

## 2025-05-07 RX ADMIN — AZELASTINE 2 SPRAY: 1 SPRAY, METERED NASAL at 08:39

## 2025-05-07 RX ADMIN — Medication 1 SPRAY: at 21:59

## 2025-05-07 RX ADMIN — INSULIN ASPART 1 UNITS: 100 INJECTION, SOLUTION INTRAVENOUS; SUBCUTANEOUS at 17:57

## 2025-05-07 RX ADMIN — LEVOTHYROXINE SODIUM 125 MCG: 125 TABLET ORAL at 08:36

## 2025-05-07 RX ADMIN — CYANOCOBALAMIN TAB 1000 MCG 1000 MCG: 1000 TAB at 08:36

## 2025-05-07 RX ADMIN — INSULIN ASPART 1 UNITS: 100 INJECTION, SOLUTION INTRAVENOUS; SUBCUTANEOUS at 08:35

## 2025-05-07 RX ADMIN — CARBOXYMETHYLCELLULOSE SODIUM 1 DROP: 5 SOLUTION/ DROPS OPHTHALMIC at 08:36

## 2025-05-07 RX ADMIN — LEVETIRACETAM 1500 MG: 750 TABLET, FILM COATED ORAL at 21:58

## 2025-05-07 RX ADMIN — ISOSORBIDE MONONITRATE 30 MG: 30 TABLET, EXTENDED RELEASE ORAL at 08:37

## 2025-05-07 RX ADMIN — FENOFIBRATE 54 MG: 54 TABLET ORAL at 08:36

## 2025-05-07 RX ADMIN — LEVETIRACETAM 1500 MG: 750 TABLET, FILM COATED ORAL at 08:36

## 2025-05-07 ASSESSMENT — ACTIVITIES OF DAILY LIVING (ADL)
ADLS_ACUITY_SCORE: 52
ADLS_ACUITY_SCORE: 53
ADLS_ACUITY_SCORE: 52
ADLS_ACUITY_SCORE: 53
ADLS_ACUITY_SCORE: 52
ADLS_ACUITY_SCORE: 53
ADLS_ACUITY_SCORE: 52
ADLS_ACUITY_SCORE: 52
ADLS_ACUITY_SCORE: 53
ADLS_ACUITY_SCORE: 52

## 2025-05-07 NOTE — TELEPHONE ENCOUNTER
Updated Daniela Freitas CNP    Recs were to resume eliquis on POD14 which would have been 5/6\  Platelet count 68k     Reviewed with Dr. Erickson who recommends holding on restarting due to platelet count    Chrissy Jackson PA-C  [address] [address]  Tel [phone]  Fax [phone]  Text page via [facility] Paging/Directory          Davie Erickson MD Hindt, Amber Lee, PA-C  We need to hold on for this platelet count please.  Thanks     ----- Message -----  From: Chrissy Modi PA-C  Sent: 5/6/2025   2:46 PM CDT  To: Davie Erickson MD; Chrissy Modi PA-C    We gave the ok to resume eliquis POD14 which is today    Platelets are only 68k however    Let me know    Thanks

## 2025-05-07 NOTE — PLAN OF CARE
FOCUS/GOAL  Mobility, Safety management, and Prevention of secondary complications    ASSESSMENT, INTERVENTIONS AND CONTINUING PLAN FOR GOAL:  Pt is hard of hearing, has hearing aids.  Diabetic BG checks QID.  Had BG over 300 at bedtime, corrected with novolog.  Continent of bowel/bladder. LBM 5/5.   Transfers assist of one with walker.  Reg thn whole.   Craniotomy TYRELL  Goal Outcome Evaluation:

## 2025-05-07 NOTE — PLAN OF CARE
"Goal Outcome Evaluation:      Plan of Care Reviewed With: patient    Overall Patient Progress: improvingOverall Patient Progress: improving  VS: BP 94/51   Pulse 69   Temp 97.7  F (36.5  C) (Oral)   Resp 16   Ht 1.803 m (5' 11\")   Wt 80 kg (176 lb 5.9 oz)   SpO2 97%   BMI 24.60 kg/m     O2: SpO2 > 97 and stable on RA. LS clear and equal bilaterally. Denies chest pain and SOB.    Output: Voids spontaneously without difficulty to bathroom.   Last BM: 5/5, denies abdominal discomfort. BS active / passing flatus.    Activity: Up with A x 1 with a walker.   Skin: WDL except, scalp incision.    Pain: Denied of pain during the shift.    CMS: Aox4, forgetful. Denies numbness and tingling.   Dressing: None   Diet: Regular diet. Denies nausea/vomiting.   BG checks before meals and at bedtime. BG check 157/207/152 pt on sliding insulin.    LDA: None.    Equipment: Personal belongings,    Plan: Fall precautions maintained / Continue with plan of care. Call light within reach, pt able to make needs known but  not using call light. Frequent checks done.    Additional Info:                "

## 2025-05-07 NOTE — PROGRESS NOTES
ARU Social Work Progress Notes     Team rounds today; targeting a discharge on 05/12 (Monday) with home care services (PT/OT/SLP).    Addendum  Home Care Referral sent and waiting on accepting home care agency.    JOYCELYN Palacios  [facility], Acute Inpatient Rehab Unit   [address] [place] [address], [address] [place] [address]  Phone: [phone]  Fax: [phone]

## 2025-05-07 NOTE — PLAN OF CARE
Discharge Planner Post-Acute Rehab OT:     Discharge Plan: Home with family support    Precautions: Falls, craniotomy, [place] - hearing aids    Current Status:  ADLs:  Mobility: SBA FWW bed mobility  flat bed supine<>eob no rail sba  Grooming: SBA standing at sink  Dressing: UB Set up. LB CGA.   Bathing: CGA extended tub bench.  Toileting: handicap height SBA transfer FWW. CGA hygiene/clothing management. simulated standard toilet transfer on 15.5 inch chair without arms sba x2   IADLs: PLOF IND simple meal prep, med admin, and driving. Family assisted laundry, grocery shopping, housekeeping at baseline.  Vision/Cognition: Mild deficits problem solving, attention, and awareness. [place] cognitive screen showing deficits with executive functioning and visuospatial skills.     Assessment: pt very fatigue  sitting in chair for 2 hrs  tolerated 15 min tx then needed to return to bed .simulated standard toilet transfer on 15.5 inch chair without arms sba x2 . bed mobility  flat bed supine<>eob no rail sba    Other Barriers to Discharge (DME, Family Training, etc):   Caregiver support: Lived with son son moved out May 1. Son will stay with dad first week and sister second week family coming in for family training sat May 10  Equipment: Owns a cane and shower chair

## 2025-05-07 NOTE — PLAN OF CARE
"Discharge Plan: home w/ son; HHPT    Precautions: falls, crani, mild impulsivity    Current Status:  Bed Mobility: Mark rolling, SBA sup<>sit  Transfer: SBA w/ FWW  Gait: Up to 250' w/ FWW, SBA  Stairs: 6\" x12, SBA w/ railing  Balance: Stable dynamic sitting and static standing. Requires UE support w/ dynamic stepping    Outcome Measures:   5TSTS:   5/6: 38.3 seconds  Worthy  10MWT:   5/6: 0.44m/s comfortable pace, CGA with FWW    Assessment: Spoke w/ pt's son Harpreet on phone. Plans that harpreet will stay with pt full time for 1 week after discharge, dtr dyana will stay with pt for second week. After 2 weeks at home, plan to wean support w/ guidance from HHPT. Discussed level of mobility assist and IADL support, son agreeable to provide. Son coming in for caregiver training Saturday 10-12 OT/PT/SLP    Other Barriers to Discharge (DME, Family Training, etc):   Will need to confirm family support.       "

## 2025-05-07 NOTE — PROGRESS NOTES
"  [facility]   Acute Rehabilitation Unit  Daily progress note  Admitted: 5/4/2025  ARU day: 3  Plan for homecare on discharge.        INTERVAL HISTORY  Pascual Perea was seen and discussed in team rounds today.  No acute events overnight and this morning Dayday has no concerns or complaints.  He denies any chest pain, shortness of breath, or headaches.  Functionally he is standby assist for mobility however has had noted decrease safety awareness with some unsafe behaviors, for example leans it extremely far when performing MYKEL cares.  24-hour supervision likely not necessary, however if possible would recommend initial 24-hour support for safety.  He will need supervision/assistance with IADLs.      Functional status:    PT:  Bed Mobility: Mark rolling, SBA sup<>sit  Transfer: SBA w/ FWW  Gait: Up to 250' w/ FWW, SBA  Stairs: 6\" x12, SBA w/ railing  Balance: Stable dynamic sitting and static standing. Requires UE support w/ dynamic stepping     Outcome Measures:   5TSTS:   5/6: 38.3 seconds  Worthy  10MWT:   5/6: 0.44m/s comfortable pace, CGA with FWW     Assessment: Spoke w/ pt's son Isadora on phone. Plans that isadora will stay with pt full time for 1 week after discharge, dtr dyana will stay with pt for second week. After 2 weeks at home, plan to wean support w/ guidance from HHPT. Discussed level of mobility assist and IADL support, son agreeable to provide. Son coming in for caregiver training Saturday 10-12 OT/PT/SLP    OT:  ADLs:  Mobility: SBA FWW bed mobility  flat bed supine<>eob no rail sba  Grooming: SBA standing at sink  Dressing: UB Set up. LB CGA.   Bathing: CGA extended tub bench.  Toileting: handicap height SBA transfer FWW. CGA hygiene/clothing management. simulated standard toilet transfer on 15.5 inch chair without arms sba x2   IADLs: PLOF IND simple meal prep, med admin, and driving. Family assisted laundry, grocery shopping, housekeeping at " baseline.  Vision/Cognition: Mild deficits problem solving, attention, and awareness. [place] cognitive screen showing deficits with executive functioning and visuospatial skills.      Assessment: pt very fatigue  sitting in chair for 2 hrs  tolerated 15 min tx then needed to return to bed .simulated standard toilet transfer on 15.5 inch chair without arms sba x2 . bed mobility  flat bed supine<>eob no rail sba    SLP:  Hearing: Bilateral hearing aids.  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  Slow processing speed and limited insight into impairments  Swallow: Regular texture diet and thin liquids (0). Not formally evaluated by ARU SLP.     Assessment:  Instructed pt in medication label reading, calendar reading, and reading instructions task of TOMER. Pt completed all tasks this day with 90% independent accuracy, required min cue to reach 100% with med label and reading instructions; max cues to reach 100% with calendar reading.         MEDICATIONS  Current Facility-Administered Medications   Medication Dose Route Frequency Provider Last Rate Last Admin    artificial saliva (BIOTENE MT) solution 1 spray  1 spray Mouth/Throat At Bedtime Josefina Segovia MD   1 spray at 05/06/25 2109    atorvastatin (LIPITOR) tablet 40 mg  40 mg Oral Daily Suzanne Purdy MD   40 mg at 05/07/25 0836    azelastine (ASTELIN) nasal spray 2 spray  2 spray Both Nostrils BID Josefina Segovia MD   2 spray at 05/07/25 0839    carboxymethylcellulose PF (REFRESH PLUS) 0.5 % ophthalmic solution 1 drop  1 drop Both Eyes BID Josefina Segovia MD   1 drop at 05/07/25 0836    cyanocobalamin (VITAMIN B-12) tablet 1,000 mcg  1,000 mcg Oral Daily Suzanne Purdy MD   1,000 mcg at 05/07/25 0836    fenofibrate (LOFIBRA) tablet 54 mg  54 mg Oral Daily with breakfast Josefina Segovia MD   54 mg at 05/07/25 0836    insulin aspart  (NovoLOG) injection (RAPID ACTING)  1-7 Units Subcutaneous TID AC Suzanne Purdy MD   2 Units at 05/07/25 1213    insulin aspart (NovoLOG) injection (RAPID ACTING)  1-5 Units Subcutaneous At Bedtime Suzanne Purdy MD   3 Units at 05/06/25 2211    insulin glargine (LANTUS PEN) injection 20 Units  20 Units Subcutaneous QAM AC Suzanne Purdy MD   20 Units at 05/07/25 0835    isosorbide mononitrate (IMDUR) 24 hr tablet 30 mg  30 mg Oral Daily Josefina Segovia MD   30 mg at 05/07/25 0837    levETIRAcetam (KEPPRA) tablet 1,500 mg  1,500 mg Oral BID Suzanne Purdy MD   1,500 mg at 05/07/25 0836    levothyroxine (SYNTHROID/LEVOTHROID) tablet 125 mcg  125 mcg Oral Daily Suzanne Purdy MD   125 mcg at 05/07/25 0836    polyethylene glycol (MIRALAX) Packet 17 g  17 g Oral Daily Suzanne Purdy MD   17 g at 05/06/25 0759    senna-docusate (SENOKOT-S/PERICOLACE) 8.6-50 MG per tablet 1 tablet  1 tablet Oral or Feeding Tube Daily Suzanne Purdy MD   1 tablet at 05/07/25 0837          Current Facility-Administered Medications   Medication Dose Route Frequency Provider Last Rate Last Admin    acetaminophen (TYLENOL) tablet 650 mg  650 mg Oral Q4H PRN Suzanne Purdy MD   650 mg at 05/06/25 2114    bisacodyl (DULCOLAX) suppository 10 mg  10 mg Rectal Daily PRN Suzanne Purdy MD        glucose gel 15-30 g  15-30 g Oral Q15 Min PRN Suzanne Purdy MD        Or    dextrose 50 % injection 25-50 mL  25-50 mL Intravenous Q15 Min PRN Suzanne Purdy MD        Or    glucagon injection 1 mg  1 mg Subcutaneous Q15 Min PRN Suzanne Purdy MD        hydrALAZINE (APRESOLINE) tablet 10 mg  10 mg Oral Q4H PRN Suzanne Purdy MD        LORazepam (ATIVAN) injection 2 mg  2 mg Intravenous Q5 Min PRN Suzanne Purdy MD        naloxone (NARCAN) injection 0.2 mg  0.2 mg Intravenous Q2 Min PRN Josefina Segovia MD        Or    naloxone (NARCAN) injection 0.4 mg  0.4 mg Intravenous Q2 Min PRN Josefina Segovia MD        Or    naloxone (NARCAN)  "injection 0.2 mg  0.2 mg Intramuscular Q2 Min PRN Josefina Segovia MD        Or    naloxone (NARCAN) injection 0.4 mg  0.4 mg Intramuscular Q2 Min PRN Josefina Segovia MD        ondansetron (ZOFRAN ODT) ODT tab 4 mg  4 mg Oral Q6H PRN Suzanne Purdy MD        oxyCODONE (ROXICODONE) tablet 5 mg  5 mg Oral Q6H PRN Josefina Segovia MD        Or    oxyCODONE (ROXICODONE) tablet 10 mg  10 mg Oral Q6H PRN Josefina Segovia MD                  PHYSICAL EXAM  BP (!) 90/32 (BP Location: Left arm)   Pulse 60   Temp 97.6  F (36.4  C) (Oral)   Resp 16   Ht 1.803 m (5' 11\")   Wt 80 kg (176 lb 5.9 oz)   SpO2 96%   BMI 24.60 kg/m      Gen: Initially sleeping, but easily awoken.  [place].  HEENT: . Right craniotomy incision c/d/i  Pulm: Non-labored breathing on room air  Abd: Non-distended  Ext: No LE edema   Neuro/MSK: Answers appropriately, follows commands      LABS  CBC RESULTS:   Recent Labs   Lab Test 05/06/25  0635 05/05/25  0755 05/03/25  1136   WBC 4.4 3.6* 4.6   RBC 3.37* 3.14* 3.01*   HGB 10.0* 9.5* 9.3*   HCT 30.2* 28.4* 26.9*   MCV 90 90 89   MCH 29.7 30.3 30.9   MCHC 33.1 33.5 34.6   RDW 15.9* 15.9* 15.7*   PLT 68* 75* 78*       Last Basic Metabolic Panel:  Recent Labs   Lab Test 05/07/25  1203 05/07/25  0751 05/07/25  0213 05/05/25  1148 05/05/25  0755 05/03/25  1144 05/03/25  1136 05/02/25  1126 05/02/25  0734   NA  --   --   --   --  138  --  139  --  140   POTASSIUM  --   --   --   --  4.1  --  4.0  --  4.1   CHLORIDE  --   --   --   --  103  --  102  --  104   CO2  --   --   --   --  29  --  28  --  26   ANIONGAP  --   --   --   --  6*  --  9  --  10   * 157* 166*   < > 92   < > 89   < > 62*   BUN  --   --   --   --  18.7  --  19.1  --  16.6   CR  --   --   --   --  1.09  --  0.97  --  0.87   GFRESTIMATED  --   --   --   --  67  --  77  --  86   STARR  --   --   --   --  9.4  --  9.1  --  9.2    < > = values in this interval not displayed.       CBC RESULTS:   Recent Labs   Lab Test " 05/06/25  0635 05/05/25  0755 05/03/25  1136   WBC 4.4 3.6* 4.6   RBC 3.37* 3.14* 3.01*   HGB 10.0* 9.5* 9.3*   HCT 30.2* 28.4* 26.9*   MCV 90 90 89   MCH 29.7 30.3 30.9   MCHC 33.1 33.5 34.6   RDW 15.9* 15.9* 15.7*   PLT 68* 75* 78*         Rehabilitation - continue comprehensive acute inpatient rehabilitation program with multidisciplinary approach including therapies, rehab nursing, and physiatry following. See interval history for updates.          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 CT head with right temporal lobe mass with associated hemorrhagic aspect, significant surrounding vasogenic edema, and right to left midline shift, later confirmed on MRI. Patient is now s/p right frontotemporal craniotomy for resection of right temporal brain tumor on 4/22/2025. 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 Post operative course complicated by cardiopulmonary arrest in PACU requiring re-intubation and CPR with ROSC along with seizure in PACU. Deficits include impaired activity tolerance, impaired balance, impaired judgement and safety awareness, impaired strength, impaired tone, and pain. Patient will require and benefit from PT and OT services as well as all of the ancillary services offered in the inpatient rehab setting.     Brain mass with hemorrhage and vasogenic edema s/p R frontotemporal craniotomy and tumor resection (4/22/2025)  Encephalopathy due to brain mass/brain edema  High grade neuroendocrine tumor, large cell type, left parotid  "gland s/p chemoradiation   R Epileptiform Discharges on EEG  Patient presented on 4/16/2025 with lethargy, somnolence, word-finding/memory difficulties, imbalance with walking, and generalized lower extremity weakness. CT head with 5.3 x 4 x 3.8 cm right temporal lobe mass with associated hemorrhagic aspect, significant surrounding vasogenic edema, and right to left midline shift 6.6 mm. MRI brain with 5.3 x 3.8 x 4.3 cm right temporal lobe mass with extensive surrounding vasogenic edema, hemorrhagic signal within the mass, and right to left midline shift 4 to 5 mm. Pt is now s/p right frontotemporal craniotomy for resection of right temporal brain tumor on 4/22/2025.  Patient with history of neuroendocrine tumor, which was thought to be in remission (follows with [facility] (Dr. Butler)). 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 Neurosurgery, oncology, and neurology followed patient while in acute care. Please see cancer treatment history outlined by Dr. Bravo in his note from 4/23.  Essentially, he notes that after patient received a course of definitive chemoradiation therapy for left parotid small cell carcinoma, a PET CT on 12/23/2024 showed complete metabolic response with no evidence of residual disease. Per Dr. Bravo on 4/30, \"Given his limited disease extent and improved clinical status, I discussed recommendations for post-operative stereotactic radiation therapy to the surgical cavity and residual disease to 3000 cGy in 5 fractions.   - Per oncology: Brain MRI and CT simulation in 2-3 weeks pending his discharge from the hospital and rehab with initiation of post-operative stereotactic radiation therapy approximately 1 week later  - Neuro checks PRN  - Platelet goal > 75k.  Plts 75 on 5/5, -->68 on 5/6, recheck with BMP on Thurs  - Now completed Decadron taper as of 5/6  - Wound cares: May leave incision open to air, cover with light gauze if patient is " "picking at incision  - Pain regimen:              - Tylenol 650mg Q4H PRN              - Oxycodone 5-10mg Q6H PRN   - Keppra 1500mg BID per general neurology  - Goal SBP <150 - hydralazine PRN ordered  - Prior recommendation to resume Eliquis for afib POD#14 (5/6), however given ongoing thrombocytopenia, NSGY recommends continuing to hold.  - Per examination, and confirmed upon reviewing OP note, sutures are absorbable  - Craniotomy precautions  - Lorazepam 2mg IV PRN for seizure >2 minutes  - Zofran 4mg Q6H PRN     Moderate oral and pharyngeal dysphagia  Patient initially had NG tube in place secondary to dysphagia. He was followed by SLP in acute care. His diet was gradually advanced. On 4/28, patient was eating 100% of meals and tube feeds were tapered and discontinued. Feeding tube was removed on 4/29. On 5/1, patient advanced to regular diet with thin liquids. Prior to discharge from acute care, SLP signed off.   - Regular diet with thin liquids     Falls  Back pain, improving  RRT was called x2 on 4/28 for falls, not resulting in significant injury.  The first episode was unwitnessed. Patient reported that his left knee gave way and he \"sat down.\"  The second episode was assisted and patient was lowered back onto his bed.  Fall precautions were added. Patient briefly had bedside attendant, but this was discontinued on 4/29. On 4/30, patient reporting back pain. Neurosurgery ordered x-ray of lumbar spine which showed osteopenia, but otherwise unremarkable. Pain improving per patient.  - Fall precautions  - Continue PT/OT  - Up with assist only, including use of gait belt and walker  - Abdominal binder PRN for comfort     Coronary artery disease status post CABG  LVH  Mild troponin elevation  Postoperative hypotension, CODE BLUE activated  Troponin 42->45 on admission. Patient denied chest pain. EKG reviewed by cardiology initially due to ST elevations in setting of repolarization abnormality, but not felt to be " "ischemic. Likely secondary to brain mass/bleed. See postoperative note from Dr. Erickson, patient had \"hypotensive episode following extubation in the PACU to the point that he required activation of CODE BLUE.  Patient also required CPR\" with achievement of ROSC. BPs more well-controlled since.  - Continue PTA Imdur      Atrial fibrillation with slow ventricular response  On cardiac monitoring in acute care, which showed evidence of ongoing, asymptomatic bradycardia.  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 Creatinine 1.22 on admission to acute care.   - Avoid nephrotoxins  - BMP qM/Th  - Cr 1.09 on 5/5     Diabetes mellitus, type 2, on long term insulin   HgbA1c 6.9% 2/10/25. Increased to 7.9% on 4/23. Prior to admission on glargine 40 units BID and aspart 3 units per carb unit. Initially, patient was on insulin drip, which was discontinued on 4/28 with resumption of patient's PTA basal insulin and slightly lower dose of prandial insulin (1 unit per 15g of carbohydrate) with high dose sliding scale insulin. On 5/1, morning blood sugar reading was 57 mg/dL.  Patient ate all of his breakfast, with resolution of hypoglycemia.  Decreased from high correction scale to medium correction scale and decreased Lantus from 40 to 35 units twice daily. On 5/2, morning blood sugar reading was 62 mg/dl.  Patient asymptomatic.  Reduced Lantus to 30 units twice daily and reduced prandial NovoLog to 1 unit per 20 g of carbohydrate Hypoglycemia persisted on 5/3, most likely in setting of dexamethasone taper. Prandial NovoLog was discontinued and Lantus was reduced to 20 units BID. Prior to admission, patient's AM BG 66. Sliding scale insulin was discontinued and Lantus was " decreased to 15mg qAM and 10mg QHS.  - Hypoglycemia protocol   - Switched Lantus from 15mg qAM and 10mg QHS to 20mg qAM on 5/5 (eliminate evening dose given low AM blood sugars and long-acting nature of Lantus)  - Glucose levels 157-318 over past 24 hours.  Will hold off on adjusting insulin today given completion of Decadron yesterday.  If glucose levels are still above goal off steroids, will plan to increase insulin.  - Medium dose SSI     Hyperlipidemia  - Continue PTA atorvastatin and fenofibrate      Hypothyroidism  - Continue PTA levothyroxine      Osteopenia  Evidenced on 4/30 L-spine XR.  - PCP to assess fracture risk and need for further work-up/treatment on discharge  -Check Vit. D level in am     Vitamin B12 deficiency  - Continue cyanocobalamin 1000mcg Qday     Dry mouth  - Artifical saliva throat spray QHS (substituted for PTA ACT dry mouth lozenge)     Allergies  - Continue PTA azelastine 2 sprays BID and artifical tears 1 drop BID (substituted for PTA polyethylene glycol drops)     Thrombocytopenia, chronic  -Likely chemotherapy induced, overall stable  -Per discharge summary from hospitalization 10/2024, cleared to resume Eliquis when plts >50K  -Per NSGY recommendation, keep Plts >75K.   -Plts 68 on 5/6, plan to routinely check qM/Th     Adjustment to disability: Clinical psychology to eval and treat if indicated   FEN: Regular diet with thin liquids  Bladder: Continent  Bowel: Miralax Qday, senna-docusate 1 tablet Qday, PRN bowel meds available  DVT Prophylaxis: Mechanical.  Holding re-initiation of Eliquis as above  GI Prophylaxis: N/A  Code: Full   Disposition: Goal for home  ELOS/Discharge Date:  Tentative discharge date 5/12  Rehab prognosis:  Good   Follow up Appointments on Discharge:   - Outpatient PCP  - Outpatient PM&R  - Outpatient therapies  - Oncology with brain MRI and CT simulation in 2-3 weeks pending his discharge from the hospital and rehab with initiation of post-operative  stereotactic radiation therapy approximately 1 week later  - Neurosurgery  - Neurology in 6-8 weeks    50 minutes spent on the date of service doing chart review, history and exam, documentation, and further activities as noted above.       Juan Segovia MD  Department of Rehabilitation Medicine

## 2025-05-07 NOTE — PLAN OF CARE
Individualized Overall Plan Of Care (IOPOC)      Rehab diagnosis/Impairment Group Code: Brain dysfunction 02.1 non-traumatic; metastatic recurrence of high grade neuroendocrine tumor.  Brain mass     Expected functional outcome: Mod I for mobility and basic ADLs, and supervision/assistance with IADLs    Clinical Impression Comments: Progressing, but noted decreased safety awareness    Mobility: Pt presents below baseline s/p right frontotemporal craniotomy for resection of right temporal brain tumor on 4/22/2025. At baseline pt is IND w/ cane at baseline, lives w/ son and has IADL assist. Pt will likely need increased assist, SBA w/ mobility at discharge d/t impulsivity and cognitive impairments. Will need to follow up with family to see if they are able to provide. ELOS 7 days.    ADL: Pt is an 84 yr old male admitted to IP ARU following hospitalization for brain mass now s/p R frontotemporal craniotomy and tumor resection. ADL/IADL performance is below baseline, impacted by impaired balance, impaired cognition, and activity precautions. Goals for SBA-Mod IND ADLs and mobility with consideration of medical complexity and cognitive status.  Pt will benefit from skilled OT intervention to address goals in the plan of care and maxiize functional IND and safety in the discharge environment. ELOS 1 week.    Communication/Cognition/Swallow: Formal cognitive-linguistic assessment completed showing overall mild cognitive impairment.  Patient with limited insights into difficulties.  Patient recognizing changes in his hearing but not making appropriate adjustments to compensate without direct cues to do so such as positioning himself to allow his good ear to hear.  Also not retrieving glasses without verbal cues to do so during a reading task.  Current level of function is likely below his baseline and patient would benefit from skilled intervention to maximize cognitive function and level of independence for return to  home.     Intensity of therapy:   PT 60 minutes, 6x/week, for 8 days  OT 60 minutes, 6 times/week, for 8 days  SLP 60 minutes, 6 times/week, for 8 days    Orthotics None  Education: Post-operative wound care  Neuropsychology Testing: No  Other:  None      Medical Prognosis: Good      Physician summary statement: Patient progressing physically, however does have some decrease safety awareness.  Will recommend supervision/assistance with all IADLs.  Ideally would recommend oversight initially upon discharge home for safety, however 24-hour supervision likely not a requirement.    Discharge destination: prior home  Discharge rehabilitation needs: home care, PT, OT, and SLP      Estimated length of stay: 8 days      Rehabilitation Physician Josefina Segovia MD

## 2025-05-07 NOTE — DISCHARGE INSTRUCTIONS
Home Health Care:  [facility]  PH: [phone]  Nurse, physical therapy, occupational therapy and speech therapy  -You will get a call after you have discharged from [facility] to schedule the first home care visit. The home health nurse should come out within the first 24-48 hours. If you don't get a call from them within the first 48 hours, please call to follow up.

## 2025-05-07 NOTE — PLAN OF CARE
Discharge Planner Post-Acute Rehab SLP:     Discharge Plan: Home with family support and ongoing SLP    Precautions: Craniotomy, impulsivity    Current Status:  Hearing: Bilateral hearing aids.  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  Slow processing speed and limited insight into impairments  Swallow: Regular texture diet and thin liquids (0). Not formally evaluated by ARU SLP.    Assessment:  Instructed pt in medication label reading, calendar reading, and reading instructions task of TOMER. Pt completed all tasks this day with 90% independent accuracy, required min cue to reach 100% with med label and reading instructions; max cues to reach 100% with calendar reading.     Other Barriers to Discharge (Family Training, etc): Ensure adequate level of support for higher level IADLs

## 2025-05-07 NOTE — PROGRESS NOTES
ARU Social Work Progress Notes     Accepting Facility    Home Health Care:  [facility]  PH: [phone]  Nurse, physical therapy, occupational therapy and speech therapy  -You will get a call after you have discharged from [facility] to schedule the first home care visit. The home health nurse should come out within the first 24-48 hours. If you don't get a call from them within the first 48 hours, please call to follow up.         JOYCELYN Palacios  [facility], Acute Inpatient Rehab Unit   [address] [place] [address], [address] [place] [address]  Phone: [phone]  Fax: [phone]

## 2025-05-07 NOTE — PLAN OF CARE
Acute Rehab Care Conference/Team Rounds    Type: Team Rounds    Present: Dr. 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     Pain: Pt has mild- moderate pain relieved with PRN Tylenol.     Medications, Skin, Tubes/Lines: Pt scalp incision approximated with sutures. TYRELL.     Swallowing/Nutrition: Regular diet/ thin liquids.     Bowel/Bladder: Continent to B/B and up to bathroom.     Psychosocial:Lives in a senior living apartment with son; daughter lives nearby and helps with some IADLs. Pt also has more children out of state     ADLs/IADLs: Early in IP rehab stay. Performance limited by impaired balance, activity precautions, and cognitive status. Performance ADLs is as follows.  ADLs:  Mobility: SBA FWW  Grooming: SBA standing at sink  Dressing: UB Set up. LB CGA.   Bathing: CGA extended tub bench.  Toileting: SBA transfer FWW. CGA hygiene/clothing management.  IADLs: PLOF IND simple meal prep, med admin, and driving. Family assisted laundry, grocery shopping, housekeeping at baseline.  Vision/Cognition: Mild deficits problem solving, attention, and awareness. [place] cognitive screen showing deficits with executive functioning and visuospatial skills.   Anticipate pt will benefit from initial supervision in home environment with consideration of cognitive  "performance and medical complexity. Plan to contact pt caregiver to determine discharge location and family support. Anticipate needs for initial supervision and IADL support, weaning to Mod IND ADLs and simple IADLs. Recommend follow up HC therapy.        Mobility:   Bed Mobility: Mark rolling, SBA sup<>sit  Transfer: SBA w/ FWW  Gait: Up to 250' w/ FWW, SBA  Stairs: 6\" x12, SBA w/ railing    Gait speed assessment indicates increased fall risk, pt considered a limited household ambulator. D/t cognitive deficits, recommending SBA w/ all mobility at discharge  using FWW vs cane. Makes questionable safety decisions at times. Will need to confirm with adult children that they are able to provide this level of assist. Expect would also need IADL support. Need to confirm home setup, do not expect there will be any barriers.     Cognition/Language: Mild cognitive impairment with limited insight into his impairments. Requiring assistance with higher level IADL tasks for safety and accuracy. When errors made, limited ability to modify his responses and requiring high level of cueing in order to be successful.     Community Re-Entry: Recommending ongoing therapies to promote improved safety and ind w/ mobility.     Transportation: Car transfer not a barrier. Family will provide    Decision maker: self    Plan of Care and goals reviewed and updated.    Discharge Plan/Recommendations    Fall Precautions: continue    Patient/Family input to goals: Yes    Anticipated rehab needs following discharge:  PT, OT, SLP    Anticipated care giver support after discharge: Family    Estimated length of stay: 8 days    Overall plan for the patient: Patient making progress, but with some noted unsafe behaviors limiting progression MRI at this time.  Will recommend supervision/oversight with all IADLs upon discharge.  Ideally would also recommend supervision/oversight at least initially on discharge for safety, however strict 24-hour " supervision likely not necessary.      Utilization Review and Continued Stay Justification    Medical Necessity Criteria:    For any criteria that is not met, please document reason and plan for discharge, transfer, or modification of plan of care to address.    Requires intensive rehabilitation program to treat functional deficits?: Yes    Requires 3x per week or greater involvement of rehabilitation physician to oversee rehabilitation program?: Yes    Requires rehabilitation nursing interventions?: Yes    Patient is making functional progress?: Yes    There is a potential for additional functional progress? Yes    Patient is participating in therapy 3 hours per day a minimum of 5 days per week or 15 hours per week in 7 day period?:Yes    Has discharge needs that require coordinated discharge planning approach?:Yes      Barriers/Concerns related to meeting medical necessity criteria:  None    Team Plan to Address Concern:  N/A      Final Physician Sign off    Statement of Approval: I have reviewed and agree with the recommendations and documentation in this team rounds note.       Patient Goals    Social Work Goals: Confirm discharge recommendations with therapy, coordinate safe discharge plan and remain available to support and assist as needed.     OT Predicted Duration/Target Date for Goal Attainment: 05/12/25  Therapy Frequency (OT): 6 times/week  OT: Hygiene/Grooming: independent  OT: Upper Body Dressing: Independent  OT: Lower Body Dressing: Supervision/stand-by assist  OT: Upper Body Bathing: Supervision/stand-by assist  OT: Lower Body Bathing: Supervision/stand-by assist  OT: Bed Mobility: Modified independent  OT: Transfer: Supervision/stand-by assist, Modified independent  OT: Toilet Transfer/Toileting: Supervision/stand-by assist, Modified independent  OT: Meal Preparation: Supervision/stand-by assist  OT: Home Management: Minimal assist  OT: Cognitive: Patient/caregiver will verbalize understanding of  cognitive assessment results/recommendations as needed for safe discharge planning  OT: Goal 1: Pt will demo SBA shower transfer using DME/AE prn.    PT Predicted Duration/Target Date for Goal Attainment: 05/11/25  PT Frequency: 6x/week  PT: Bed Mobility: Independent  PT: Transfers: Supervision/stand-by assist  PT: Gait: Supervision/stand-by assist (Cane vs FWW)  PT: Goal 1: Car transfer w/ SBA FWW vs SPC  PT: Goal 2: Pt and family will indpendently verbalize 3 fall prevention strategies following education.     SLP Predicted Duration/Target Date for Goal Attainment: 05/14/25  Therapy Frequency (SLP Eval): 6 times/week   SLP: Goal 1: Patient will utilize compensatory memory strategies to recall novel information with presence of distractions and delays with 90% accuracy  SLP: Goal 2: Patient will complete moderate to complex level reasoning/problem-solving tasks without need for redirection with 90% accuracy                 Patient/Family Goal: Bowel: Patient will maintain regular bowel pattern by utilizing toileting schedule or bowel program if necessary  Patient/Family Goal: Bladder: Patient will maintain bladder continence and promote adequate bladder emptying by using the urinal/toilet     Patient/Family Goal: Medication Management: Patient will have knowledge on his medication and pass MAP before discharge from ARU        Goal: Skin Integrity: Patient will reposition independently in bed or chair to prevent pressure injury during stay at ARU                    Goal: Safety Management: Patient will use call light appropriately for assistance until made MOD I or independent or discharge from ARU.

## 2025-05-08 ENCOUNTER — APPOINTMENT (OUTPATIENT)
Dept: OCCUPATIONAL THERAPY | Facility: CLINIC | Age: 85
DRG: 843 | End: 2025-05-08
Attending: PHYSICAL MEDICINE & REHABILITATION
Payer: COMMERCIAL

## 2025-05-08 ENCOUNTER — APPOINTMENT (OUTPATIENT)
Dept: SPEECH THERAPY | Facility: CLINIC | Age: 85
DRG: 843 | End: 2025-05-08
Attending: PHYSICAL MEDICINE & REHABILITATION
Payer: COMMERCIAL

## 2025-05-08 ENCOUNTER — APPOINTMENT (OUTPATIENT)
Dept: PHYSICAL THERAPY | Facility: CLINIC | Age: 85
DRG: 843 | End: 2025-05-08
Attending: PHYSICAL MEDICINE & REHABILITATION
Payer: COMMERCIAL

## 2025-05-08 VITALS
TEMPERATURE: 98.7 F | SYSTOLIC BLOOD PRESSURE: 111 MMHG | HEART RATE: 63 BPM | RESPIRATION RATE: 16 BRPM | WEIGHT: 176.37 LBS | HEIGHT: 71 IN | OXYGEN SATURATION: 98 % | BODY MASS INDEX: 24.69 KG/M2 | DIASTOLIC BLOOD PRESSURE: 59 MMHG

## 2025-05-08 LAB
ANION GAP SERPL CALCULATED.3IONS-SCNC: 11 MMOL/L (ref 7–15)
BASOPHILS # BLD AUTO: 0 10E3/UL (ref 0–0.2)
BASOPHILS NFR BLD AUTO: 0 %
BUN SERPL-MCNC: 16.2 MG/DL (ref 8–23)
CALCIUM SERPL-MCNC: 9 MG/DL (ref 8.8–10.4)
CHLORIDE SERPL-SCNC: 105 MMOL/L (ref 98–107)
CREAT SERPL-MCNC: 0.88 MG/DL (ref 0.67–1.17)
EGFRCR SERPLBLD CKD-EPI 2021: 85 ML/MIN/1.73M2
EOSINOPHIL # BLD AUTO: 0.1 10E3/UL (ref 0–0.7)
EOSINOPHIL NFR BLD AUTO: 2 %
ERYTHROCYTE [DISTWIDTH] IN BLOOD BY AUTOMATED COUNT: 15.9 % (ref 10–15)
GLUCOSE BLDC GLUCOMTR-MCNC: 124 MG/DL (ref 70–99)
GLUCOSE BLDC GLUCOMTR-MCNC: 126 MG/DL (ref 70–99)
GLUCOSE BLDC GLUCOMTR-MCNC: 145 MG/DL (ref 70–99)
GLUCOSE BLDC GLUCOMTR-MCNC: 152 MG/DL (ref 70–99)
GLUCOSE BLDC GLUCOMTR-MCNC: 183 MG/DL (ref 70–99)
GLUCOSE SERPL-MCNC: 179 MG/DL (ref 70–99)
HCO3 SERPL-SCNC: 23 MMOL/L (ref 22–29)
HCT VFR BLD AUTO: 28.3 % (ref 40–53)
HGB BLD-MCNC: 9.2 G/DL (ref 13.3–17.7)
IMM GRANULOCYTES # BLD: 0 10E3/UL
IMM GRANULOCYTES NFR BLD: 0 %
LYMPHOCYTES # BLD AUTO: 0.5 10E3/UL (ref 0.8–5.3)
LYMPHOCYTES NFR BLD AUTO: 19 %
MAGNESIUM SERPL-MCNC: 2.1 MG/DL (ref 1.7–2.3)
MCH RBC QN AUTO: 30 PG (ref 26.5–33)
MCHC RBC AUTO-ENTMCNC: 32.5 G/DL (ref 31.5–36.5)
MCV RBC AUTO: 92 FL (ref 78–100)
MONOCYTES # BLD AUTO: 0.2 10E3/UL (ref 0–1.3)
MONOCYTES NFR BLD AUTO: 8 %
NEUTROPHILS # BLD AUTO: 2 10E3/UL (ref 1.6–8.3)
NEUTROPHILS NFR BLD AUTO: 70 %
NRBC # BLD AUTO: 0 10E3/UL
NRBC BLD AUTO-RTO: 0 /100
PHOSPHATE SERPL-MCNC: 2.8 MG/DL (ref 2.5–4.5)
PLATELET # BLD AUTO: 60 10E3/UL (ref 150–450)
POTASSIUM SERPL-SCNC: 3.9 MMOL/L (ref 3.4–5.3)
RBC # BLD AUTO: 3.07 10E6/UL (ref 4.4–5.9)
SODIUM SERPL-SCNC: 139 MMOL/L (ref 135–145)
VIT D+METAB SERPL-MCNC: 16 NG/ML (ref 20–50)
WBC # BLD AUTO: 2.9 10E3/UL (ref 4–11)

## 2025-05-08 PROCEDURE — 97129 THER IVNTJ 1ST 15 MIN: CPT | Mod: GN

## 2025-05-08 PROCEDURE — 84100 ASSAY OF PHOSPHORUS: CPT

## 2025-05-08 PROCEDURE — 250N000013 HC RX MED GY IP 250 OP 250 PS 637: Performed by: PHYSICAL MEDICINE & REHABILITATION

## 2025-05-08 PROCEDURE — 99232 SBSQ HOSP IP/OBS MODERATE 35: CPT | Performed by: PHYSICIAN ASSISTANT

## 2025-05-08 PROCEDURE — 85018 HEMOGLOBIN: CPT

## 2025-05-08 PROCEDURE — 250N000013 HC RX MED GY IP 250 OP 250 PS 637

## 2025-05-08 PROCEDURE — 97535 SELF CARE MNGMENT TRAINING: CPT | Mod: GO

## 2025-05-08 PROCEDURE — 85025 COMPLETE CBC W/AUTO DIFF WBC: CPT

## 2025-05-08 PROCEDURE — 36415 COLL VENOUS BLD VENIPUNCTURE: CPT

## 2025-05-08 PROCEDURE — 82306 VITAMIN D 25 HYDROXY: CPT | Performed by: PHYSICAL MEDICINE & REHABILITATION

## 2025-05-08 PROCEDURE — 80048 BASIC METABOLIC PNL TOTAL CA: CPT

## 2025-05-08 PROCEDURE — 83735 ASSAY OF MAGNESIUM: CPT

## 2025-05-08 PROCEDURE — 97530 THERAPEUTIC ACTIVITIES: CPT | Mod: GP

## 2025-05-08 PROCEDURE — 128N000003 HC R&B REHAB

## 2025-05-08 PROCEDURE — 97112 NEUROMUSCULAR REEDUCATION: CPT | Mod: GP

## 2025-05-08 PROCEDURE — 97130 THER IVNTJ EA ADDL 15 MIN: CPT | Mod: GN

## 2025-05-08 RX ADMIN — SENNOSIDES AND DOCUSATE SODIUM 1 TABLET: 50; 8.6 TABLET ORAL at 08:25

## 2025-05-08 RX ADMIN — INSULIN ASPART 1 UNITS: 100 INJECTION, SOLUTION INTRAVENOUS; SUBCUTANEOUS at 11:59

## 2025-05-08 RX ADMIN — ISOSORBIDE MONONITRATE 30 MG: 30 TABLET, EXTENDED RELEASE ORAL at 08:25

## 2025-05-08 RX ADMIN — FENOFIBRATE 54 MG: 54 TABLET ORAL at 08:25

## 2025-05-08 RX ADMIN — CARBOXYMETHYLCELLULOSE SODIUM 1 DROP: 5 SOLUTION/ DROPS OPHTHALMIC at 21:30

## 2025-05-08 RX ADMIN — AZELASTINE 2 SPRAY: 1 SPRAY, METERED NASAL at 08:25

## 2025-05-08 RX ADMIN — AZELASTINE 2 SPRAY: 1 SPRAY, METERED NASAL at 21:29

## 2025-05-08 RX ADMIN — LEVOTHYROXINE SODIUM 125 MCG: 125 TABLET ORAL at 08:25

## 2025-05-08 RX ADMIN — ATORVASTATIN CALCIUM 40 MG: 40 TABLET, FILM COATED ORAL at 08:25

## 2025-05-08 RX ADMIN — LEVETIRACETAM 1500 MG: 750 TABLET, FILM COATED ORAL at 08:25

## 2025-05-08 RX ADMIN — CYANOCOBALAMIN TAB 1000 MCG 1000 MCG: 1000 TAB at 08:25

## 2025-05-08 RX ADMIN — Medication 1 SPRAY: at 21:27

## 2025-05-08 RX ADMIN — LEVETIRACETAM 1500 MG: 750 TABLET, FILM COATED ORAL at 21:30

## 2025-05-08 RX ADMIN — CARBOXYMETHYLCELLULOSE SODIUM 1 DROP: 5 SOLUTION/ DROPS OPHTHALMIC at 08:25

## 2025-05-08 RX ADMIN — POLYETHYLENE GLYCOL 3350 17 G: 17 POWDER, FOR SOLUTION ORAL at 08:25

## 2025-05-08 ASSESSMENT — ACTIVITIES OF DAILY LIVING (ADL)
ADLS_ACUITY_SCORE: 53
ADLS_ACUITY_SCORE: 52
ADLS_ACUITY_SCORE: 53
ADLS_ACUITY_SCORE: 52
ADLS_ACUITY_SCORE: 52
ADLS_ACUITY_SCORE: 53
ADLS_ACUITY_SCORE: 52
ADLS_ACUITY_SCORE: 53
ADLS_ACUITY_SCORE: 53
ADLS_ACUITY_SCORE: 52
ADLS_ACUITY_SCORE: 53
ADLS_ACUITY_SCORE: 52
ADLS_ACUITY_SCORE: 53
ADLS_ACUITY_SCORE: 52
ADLS_ACUITY_SCORE: 53
ADLS_ACUITY_SCORE: 52

## 2025-05-08 NOTE — PLAN OF CARE
"Goal Outcome Evaluation:      Plan of Care Reviewed With: patient    Overall Patient Progress: improving     Pt is calm, cooperative, pleasant, able to make his needs known, has call light within reach, and uses it appropriately.     VS: /54 (BP Location: Left arm, Patient Position: Semi-Luna's, Cuff Size: Adult Regular)   Pulse 61   Temp 97.6  F (36.4  C) (Oral)   Resp 16   Ht 1.803 m (5' 11\")   Wt 80 kg (176 lb 5.9 oz)   SpO2 99%   BMI 24.60 kg/m     O2: Room Air   Output: Continent x 2   Last BM: 5/5/25   Activity: Assist x 1 w/ GB & Walker   Up for meals? Good appetite; encourage to sit upright at meal times   Skin: Scattered bruising BUE; Surgical incision right scalp    Pain: Denies pain   CMS: A&O x 4; Stable gait w/ walker; Denied numbness/tingling in extremities   Dressing: None   Diet: Regular; Thin; Pills whole   LDA: None   Plan: Continue POC   Additional Info:               "

## 2025-05-08 NOTE — PLAN OF CARE
"Discharge Planner Post-Acute Rehab SLP:     Discharge Plan: Home with family support and ongoing SLP    Precautions: Craniotomy, impulsivity    Current Status:  Hearing: Bilateral hearing aids.  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  Slow processing speed and limited insight into impairments  Swallow: Regular texture diet and thin liquids (0). Not formally evaluated by ARU SLP.    Assessment:  Facilitated discussion with pt re: cognition. Pt with limited insight into deficits as he reported \"things will be better when I am at home with all my things\". SLP provided education to increase insgiht and was unsuccessful. Pt participated in organization/reasoning task, arranging items in closet given criteria. Introduced to strategies to aid IND. Pt required overall mod-max cues to complete with 100% accuracy. noting significant difficulty with mental flexibility, reasoning, organization, slow processing, and working memory.     Other Barriers to Discharge (Family Training, etc): Ensure adequate level of support for higher level IADLs    "

## 2025-05-08 NOTE — PLAN OF CARE
Discharge Planner Post-Acute Rehab OT:     Discharge Plan: Home with family support    Precautions: Falls, craniotomy, [place] - hearing aids    Current Status:  ADLs:  Mobility: SBA FWW bed mobility  flat bed supine<>eob no rail sba  Grooming: SBA standing at sink  Dressing: UB Set up. LB CGA.   Bathing: CGA extended tub bench.  Toileting: handicap height SBA transfer FWW. CGA hygiene/clothing management. simulated standard toilet transfer on 15.5 inch chair without arms sba x2   IADLs: PLOF IND simple meal prep, med admin, and driving. Family assisted laundry, grocery shopping, housekeeping at baseline.  Vision/Cognition: Mild deficits problem solving, attention, and awareness. [place] cognitive screen showing deficits with executive functioning and visuospatial skills.     Assessment:ot focus on bed mobility toileting low family room style transfer sba 2 seat couch and chair cga side stepping into walk in shower and simulated standard toilet seat on 15.5 inch armless chair ,med cpt subtest 4.5     Other Barriers to Discharge (DME, Family Training, etc):   Caregiver support: Lived with son son moved out May 1. Son will stay with dad first week and sister second week family coming in for family training sat May 10  Equipment: Owns a cane and shower chair

## 2025-05-08 NOTE — PLAN OF CARE
FOCUS/GOAL  Mobility, Safety management, and Prevention of secondary complications    ASSESSMENT, INTERVENTIONS AND CONTINUING PLAN FOR GOAL:  Pt calls appropriately.  Alert and oriented x4.  Hard of hearing has bilateral hearing aids. Denies pain.  Hypotensive.  Regular thin diet, takes pills whole with water.  No acute concerns overnight.  Goal Outcome Evaluation:

## 2025-05-08 NOTE — PLAN OF CARE
"Discharge Plan: home w/ son; HHPT    Precautions: falls, crani, mild impulsivity    Current Status:  Bed Mobility: Mark rolling, SBA sup<>sit  Transfer: SBA w/ cane  Gait: Up to 250' w/ cane, SBA  Stairs: 6\" x12, SBA w/ railing  Balance: Stable dynamic sitting and static standing. Requires UE support w/ dynamic stepping    Outcome Measures:   5TSTS:   5/6: 38.3 seconds  [place]  10MWT:   5/6: 0.44m/s comfortable pace, CGA with FWW    Assessment: Self selected speed below capable speed. Continues to be steady with functional mobility.    Other Barriers to Discharge (DME, Family Training, etc):   Will need to confirm family support.       "

## 2025-05-08 NOTE — PROGRESS NOTES
"  [facility]   Acute Rehabilitation Unit  Daily progress note  Admitted: 5/4/2025  ARU day: 4  Plan for homecare on discharge.      INTERVAL HISTORY  Pascual Perea \"Dayday\" was seen up ambulating in hallway with therapy then upon return to room.  He reports he is generally tired after therapy but denies dizziness, nausea, sob, headache, and fever.  Says he is eating and sleeping ok.  Reports urinary frequency without dysuria or concerns, says he had 2 episodes of diarrhea yesterday though denies abdominal pain/discomfort today.  Looking forward to going home 5/12.     We discussed/ reviewed labs normal BMP and discussed low WBC and Platelets reports \"those numbers have been low a long time\".  Denies other questions or concerns.      Per PT:   Current Status:  Bed Mobility: Mark rolling, SBA sup<>sit  Transfer: SBA w/ cane  Gait: Up to 250' w/ cane, SBA  Stairs: 6\" x12, SBA w/ railing  Balance: Stable dynamic sitting and static standing. Requires UE support w/ dynamic stepping     Outcome Measures:   5TSTS:   5/6: 38.3 seconds  Worthy  10MWT:   5/6: 0.44m/s comfortable pace, CGA with FWW     Assessment: Self selected speed below capable speed. Continues to be steady with functional mobility.    MEDICATIONS  Current Facility-Administered Medications   Medication Dose Route Frequency Provider Last Rate Last Admin    artificial saliva (BIOTENE MT) solution 1 spray  1 spray Mouth/Throat At Bedtime Josefina Segovia MD   1 spray at 05/07/25 6929    atorvastatin (LIPITOR) tablet 40 mg  40 mg Oral Daily Suzanne Purdy MD   40 mg at 05/08/25 0825    azelastine (ASTELIN) nasal spray 2 spray  2 spray Both Nostrils BID Josefina Segovia MD   2 spray at 05/08/25 0825    carboxymethylcellulose PF (REFRESH PLUS) 0.5 % ophthalmic solution 1 drop  1 drop Both Eyes BID Josefina Segovia MD   1 drop at 05/08/25 0825    cyanocobalamin (VITAMIN B-12) tablet 1,000 mcg  1,000 mcg Oral Daily " Suzanne Purdy MD   1,000 mcg at 05/08/25 0825    fenofibrate (LOFIBRA) tablet 54 mg  54 mg Oral Daily with breakfast Josefina Segovia MD   54 mg at 05/08/25 0825    insulin aspart (NovoLOG) injection (RAPID ACTING)  1-7 Units Subcutaneous TID AC Suzanne Purdy MD   1 Units at 05/07/25 1757    insulin aspart (NovoLOG) injection (RAPID ACTING)  1-5 Units Subcutaneous At Bedtime Suzanne Purdy MD   1 Units at 05/07/25 2203    insulin glargine (LANTUS PEN) injection 20 Units  20 Units Subcutaneous QAM AC Josefina Segovia MD   20 Units at 05/08/25 0824    [Held by provider] isosorbide mononitrate (IMDUR) 24 hr tablet 30 mg  30 mg Oral Daily Josefina Segovia MD   30 mg at 05/08/25 0825    levETIRAcetam (KEPPRA) tablet 1,500 mg  1,500 mg Oral BID Suzanne Purdy MD   1,500 mg at 05/08/25 0825    levothyroxine (SYNTHROID/LEVOTHROID) tablet 125 mcg  125 mcg Oral Daily Suzanne Purdy MD   125 mcg at 05/08/25 0825    polyethylene glycol (MIRALAX) Packet 17 g  17 g Oral Daily Suzanne Purdy MD   17 g at 05/08/25 0825    senna-docusate (SENOKOT-S/PERICOLACE) 8.6-50 MG per tablet 1 tablet  1 tablet Oral or Feeding Tube Daily Suzanne Purdy MD   1 tablet at 05/08/25 0825          Current Facility-Administered Medications   Medication Dose Route Frequency Provider Last Rate Last Admin    acetaminophen (TYLENOL) tablet 650 mg  650 mg Oral Q4H PRN Suzanne Purdy MD   650 mg at 05/06/25 2114    bisacodyl (DULCOLAX) suppository 10 mg  10 mg Rectal Daily PRN Suzanne Purdy MD        glucose gel 15-30 g  15-30 g Oral Q15 Min PRN Suzanne Purdy MD        Or    dextrose 50 % injection 25-50 mL  25-50 mL Intravenous Q15 Min PRN Suzanne Purdy MD        Or    glucagon injection 1 mg  1 mg Subcutaneous Q15 Min PRN Suzanne Purdy MD        hydrALAZINE (APRESOLINE) tablet 10 mg  10 mg Oral Q4H PRN Suzanne Purdy MD        LORazepam (ATIVAN) injection 2 mg  2 mg Intravenous Q5 Min PRN Suzanne Purdy MD        naloxone (NARCAN)  "injection 0.2 mg  0.2 mg Intravenous Q2 Min PRN Josefina Segovia MD        Or    naloxone (NARCAN) injection 0.4 mg  0.4 mg Intravenous Q2 Min PRN Josefina Segovia MD        Or    naloxone (NARCAN) injection 0.2 mg  0.2 mg Intramuscular Q2 Min PRN Josefina Segovia MD        Or    naloxone (NARCAN) injection 0.4 mg  0.4 mg Intramuscular Q2 Min PRN Josefina Segovia MD        ondansetron (ZOFRAN ODT) ODT tab 4 mg  4 mg Oral Q6H PRN Suzanne Purdy MD        oxyCODONE (ROXICODONE) tablet 5 mg  5 mg Oral Q6H PRN Josefina Segovia MD        Or    oxyCODONE (ROXICODONE) tablet 10 mg  10 mg Oral Q6H PRN Josefina Segovia MD          PHYSICAL EXAM  /54 (BP Location: Left arm, Patient Position: Semi-Luna's, Cuff Size: Adult Regular)   Pulse 61   Temp 97.6  F (36.4  C) (Oral)   Resp 16   Ht 1.803 m (5' 11\")   Wt 80 kg (176 lb 5.9 oz)   SpO2 99%   BMI 24.60 kg/m      Gen: awake alert   HEENT: . Right craniotomy incision c/d/I bilateral hearing aides in place  Pulm: Non-labored breathing clear on room air  CV: RRR  Abd: Non-distended soft non tender + BS  Ext: No LE edema no calf tenderness  Neuro/MSK: Answers appropriately, follows commands up ambulating with walker with PT down schuler      LABS  CBC RESULTS:   Recent Labs   Lab Test 05/08/25  0815 05/06/25  0635 05/05/25  0755   WBC 2.9* 4.4 3.6*   RBC 3.07* 3.37* 3.14*   HGB 9.2* 10.0* 9.5*   HCT 28.3* 30.2* 28.4*   MCV 92 90 90   MCH 30.0 29.7 30.3   MCHC 32.5 33.1 33.5   RDW 15.9* 15.9* 15.9*   PLT 60* 68* 75*       Last Basic Metabolic Panel:  Recent Labs   Lab Test 05/08/25  0815 05/08/25  0722 05/08/25  0208 05/05/25  1148 05/05/25  0755 05/03/25  1144 05/03/25  1136     --   --   --  138  --  139   POTASSIUM 3.9  --   --   --  4.1  --  4.0   CHLORIDE 105  --   --   --  103  --  102   CO2 23  --   --   --  29  --  28   ANIONGAP 11  --   --   --  6*  --  9   * 126* 145*   < > 92   < > 89   BUN 16.2  --   --   --  18.7  " --  19.1   CR 0.88  --   --   --  1.09  --  0.97   GFRESTIMATED 85  --   --   --  67  --  77   STARR 9.0  --   --   --  9.4  --  9.1    < > = values in this interval not displayed.       CBC RESULTS:   Recent Labs   Lab Test 05/08/25  0815 05/06/25  0635 05/05/25  0755   WBC 2.9* 4.4 3.6*   RBC 3.07* 3.37* 3.14*   HGB 9.2* 10.0* 9.5*   HCT 28.3* 30.2* 28.4*   MCV 92 90 90   MCH 30.0 29.7 30.3   MCHC 32.5 33.1 33.5   RDW 15.9* 15.9* 15.9*   PLT 60* 68* 75*     Rehabilitation - continue comprehensive acute inpatient rehabilitation program with multidisciplinary approach including therapies, rehab nursing, and physiatry following. See interval history for updates.      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 CT head with right temporal lobe mass with associated hemorrhagic aspect, significant surrounding vasogenic edema, and right to left midline shift, later confirmed on MRI. Patient is now s/p right frontotemporal craniotomy for resection of right temporal brain tumor on 4/22/2025. 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 Post operative course complicated by cardiopulmonary arrest in PACU requiring re-intubation and CPR with ROSC along with seizure in PACU. Deficits include impaired activity tolerance, impaired balance, impaired judgement and safety awareness, impaired strength, impaired tone, and pain. Patient will require and benefit from PT and OT services as well as all of the ancillary services offered in the inpatient rehab setting.     Brain  "mass with hemorrhage and vasogenic edema s/p R frontotemporal craniotomy and tumor resection (4/22/2025)  Encephalopathy due to brain mass/brain edema  High grade neuroendocrine tumor, large cell type, left parotid gland s/p chemoradiation   R Epileptiform Discharges on EEG  Patient presented on 4/16/2025 with lethargy, somnolence, word-finding/memory difficulties, imbalance with walking, and generalized lower extremity weakness. CT head with 5.3 x 4 x 3.8 cm right temporal lobe mass with associated hemorrhagic aspect, significant surrounding vasogenic edema, and right to left midline shift 6.6 mm. MRI brain with 5.3 x 3.8 x 4.3 cm right temporal lobe mass with extensive surrounding vasogenic edema, hemorrhagic signal within the mass, and right to left midline shift 4 to 5 mm. Pt is now s/p right frontotemporal craniotomy for resection of right temporal brain tumor on 4/22/2025.  Patient with history of neuroendocrine tumor, which was thought to be in remission (follows with [facility] (Dr. Butler)). 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 Neurosurgery, oncology, and neurology followed patient while in acute care. Please see cancer treatment history outlined by Dr. Bravo in his note from 4/23.  Essentially, he notes that after patient received a course of definitive chemoradiation therapy for left parotid small cell carcinoma, a PET CT on 12/23/2024 showed complete metabolic response with no evidence of residual disease. Per Dr. Bravo on 4/30, \"Given his limited disease extent and improved clinical status, I discussed recommendations for post-operative stereotactic radiation therapy to the surgical cavity and residual disease to 3000 cGy in 5 fractions.   - Per oncology: Brain MRI and CT simulation in 2-3 weeks pending his discharge from the hospital and rehab with initiation of post-operative stereotactic radiation therapy approximately 1 week later  - Neuro checks " "PRN  - Platelet goal > 75k.  Plts 75 on 5/5, -->68 on 5/6--> 60 5/8 (reached out to neurosurgery 5/8/25 to confirm goals/ transfusion recommendations given 2+ weeks from surgery)  - Now completed Decadron taper as of 5/6  - Wound cares: May leave incision open to air, cover with light gauze if patient is picking at incision  - Pain regimen:              - Tylenol 650mg Q4H PRN              - Oxycodone 5-10mg Q6H PRN   - Keppra 1500mg BID per general neurology  - Goal SBP <150 - hydralazine PRN ordered  - Prior recommendation to resume Eliquis for afib POD#14 (5/6), however given ongoing thrombocytopenia, NSGY recommends continuing to hold.  - Craniotomy precautions  - Lorazepam 2mg IV PRN for seizure >2 minutes  - Zofran 4mg Q6H PRN     Moderate oral and pharyngeal dysphagia  Patient initially had NG tube in place secondary to dysphagia. He was followed by SLP in acute care. His diet was gradually advanced. On 4/28, patient was eating 100% of meals and tube feeds were tapered and discontinued. Feeding tube was removed on 4/29. On 5/1, patient advanced to regular diet with thin liquids. Prior to discharge from acute care, SLP signed off.   - Regular diet with thin liquids     Falls  Back pain, improving  RRT was called x2 on 4/28 for falls, not resulting in significant injury.  The first episode was unwitnessed. Patient reported that his left knee gave way and he \"sat down.\"  The second episode was assisted and patient was lowered back onto his bed.  Fall precautions were added. Patient briefly had bedside attendant, but this was discontinued on 4/29. On 4/30, patient reporting back pain. Neurosurgery ordered x-ray of lumbar spine which showed osteopenia, but otherwise unremarkable. Pain improving per patient.  - Fall precautions  - Continue PT/OT  - Up with assist only, including use of gait belt and walker  - Abdominal binder PRN for comfort     Coronary artery disease status post CABG  LVH  Mild troponin " "elevation  Postoperative hypotension, CODE BLUE activated  Troponin 42->45 on admission. Patient denied chest pain. EKG reviewed by cardiology initially due to ST elevations in setting of repolarization abnormality, but not felt to be ischemic. Likely secondary to brain mass/bleed. See postoperative note from Dr. Erickson, patient had \"hypotensive episode following extubation in the PACU to the point that he required activation of CODE BLUE.  Patient also required CPR\" with achievement of ROSC.  Has been hypotensive last 24 hours.  Continue to Hold IMDUR- monitor bp (SBP )     Atrial fibrillation with slow ventricular response  On cardiac monitoring in acute care, which showed evidence of ongoing, asymptomatic bradycardia.  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 Creatinine 1.22 on admission to acute care. -->Cr 0.88 5/8/25  - Avoid nephrotoxins  - BMP qM/Th     Diabetes mellitus, type 2, on long term insulin   HgbA1c 6.9% 2/10/25. Increased to 7.9% on 4/23/25 . Prior to admission on glargine 40 units BID and aspart 3 units per carb unit. Initially, patient was on insulin drip, which was discontinued on 4/28 with resumption of patient's PTA basal insulin and slightly lower dose of prandial insulin (1 unit per 15g of carbohydrate) with high dose sliding scale insulin.  With post operative hypoglycemia and ongoing insulin titration.   - Hypoglycemia protocol   - Glucose levels improved 126-210 last 24 hours- continue to trend   -continue lantus 20 units   - Medium dose SSI     Hyperlipidemia  - Continue PTA atorvastatin and fenofibrate      Hypothyroidism  - Continue PTA levothyroxine      Osteopenia  Evidenced on 4/30 L-spine XR.  - PCP to assess fracture risk " and need for further work-up/treatment on discharge  -Check Vit. D level drawn 5/8 in process.      Vitamin B12 deficiency  - Continue cyanocobalamin 1000mcg Qday     Dry mouth  - Artifical saliva throat spray QHS (substituted for PTA ACT dry mouth lozenge)     Allergies  - Continue PTA azelastine 2 sprays BID and artifical tears 1 drop BID (substituted for PTA polyethylene glycol drops)     Thrombocytopenia, chronic  -Likely chemotherapy induced, overall stable  -Per discharge summary from hospitalization 10/2024, cleared to resume Eliquis when plts >50K  Per NSGY recommendation, keep Plts >75K. Continue to hold eliquis per neurosurgery   -Plts 68 on 5/6, plan to routinely check qM/Th-->60 5/8     Adjustment to disability: Clinical psychology to eval and treat if indicated   FEN: Regular diet with thin liquids  Bladder: Continent  Bowel: Miralax Qday, senna-docusate 1 tablet Qday, PRN bowel meds available  DVT Prophylaxis: Mechanical.  Holding re-initiation of Eliquis as above  GI Prophylaxis: N/A  Code: Full   Disposition: Goal for home  ELOS/Discharge Date:  Tentative discharge date 5/12  Rehab prognosis:  Good   Follow up Appointments on Discharge:   - Outpatient PCP  - Outpatient PM&R  - Outpatient therapies  - Oncology with brain MRI and CT simulation in 2-3 weeks pending his discharge from the hospital and rehab with initiation of post-operative stereotactic radiation therapy approximately 1 week later  - Neurosurgery  - Neurology in 6-8 weeks    45 minutes spent on the date of service doing chart review, history and exam, documentation, and further activities as noted above.       Marina Ricks PA-C  Department of Rehabilitation Medicine

## 2025-05-09 ENCOUNTER — APPOINTMENT (OUTPATIENT)
Dept: OCCUPATIONAL THERAPY | Facility: CLINIC | Age: 85
DRG: 843 | End: 2025-05-09
Attending: PHYSICAL MEDICINE & REHABILITATION
Payer: COMMERCIAL

## 2025-05-09 ENCOUNTER — APPOINTMENT (OUTPATIENT)
Dept: SPEECH THERAPY | Facility: CLINIC | Age: 85
DRG: 843 | End: 2025-05-09
Attending: PHYSICAL MEDICINE & REHABILITATION
Payer: COMMERCIAL

## 2025-05-09 ENCOUNTER — APPOINTMENT (OUTPATIENT)
Dept: PHYSICAL THERAPY | Facility: CLINIC | Age: 85
DRG: 843 | End: 2025-05-09
Attending: PHYSICAL MEDICINE & REHABILITATION
Payer: COMMERCIAL

## 2025-05-09 LAB
GLUCOSE BLDC GLUCOMTR-MCNC: 124 MG/DL (ref 70–99)
GLUCOSE BLDC GLUCOMTR-MCNC: 151 MG/DL (ref 70–99)
GLUCOSE BLDC GLUCOMTR-MCNC: 152 MG/DL (ref 70–99)
GLUCOSE BLDC GLUCOMTR-MCNC: 183 MG/DL (ref 70–99)
GLUCOSE BLDC GLUCOMTR-MCNC: 267 MG/DL (ref 70–99)

## 2025-05-09 PROCEDURE — 97130 THER IVNTJ EA ADDL 15 MIN: CPT | Mod: GN | Performed by: SPEECH-LANGUAGE PATHOLOGIST

## 2025-05-09 PROCEDURE — 250N000013 HC RX MED GY IP 250 OP 250 PS 637

## 2025-05-09 PROCEDURE — 250N000013 HC RX MED GY IP 250 OP 250 PS 637: Performed by: PHYSICAL MEDICINE & REHABILITATION

## 2025-05-09 PROCEDURE — 97112 NEUROMUSCULAR REEDUCATION: CPT | Mod: GP

## 2025-05-09 PROCEDURE — 99232 SBSQ HOSP IP/OBS MODERATE 35: CPT

## 2025-05-09 PROCEDURE — 97129 THER IVNTJ 1ST 15 MIN: CPT | Mod: GN | Performed by: SPEECH-LANGUAGE PATHOLOGIST

## 2025-05-09 PROCEDURE — 97535 SELF CARE MNGMENT TRAINING: CPT | Mod: GO

## 2025-05-09 PROCEDURE — 97110 THERAPEUTIC EXERCISES: CPT | Mod: GP

## 2025-05-09 PROCEDURE — 128N000003 HC R&B REHAB

## 2025-05-09 PROCEDURE — 97530 THERAPEUTIC ACTIVITIES: CPT | Mod: GP

## 2025-05-09 RX ORDER — ACETAMINOPHEN 325 MG/1
650 TABLET ORAL EVERY 4 HOURS PRN
COMMUNITY
Start: 2025-05-09

## 2025-05-09 RX ORDER — CHOLECALCIFEROL (VITAMIN D3) 1250 MCG
1250 CAPSULE ORAL
Qty: 4 CAPSULE | Refills: 0 | Status: SHIPPED | OUTPATIENT
Start: 2025-05-09

## 2025-05-09 RX ORDER — FENOFIBRATE 54 MG/1
54 TABLET ORAL
Qty: 30 TABLET | Refills: 0 | Status: SHIPPED | OUTPATIENT
Start: 2025-05-10

## 2025-05-09 RX ORDER — CHOLECALCIFEROL (VITAMIN D3) 1250 MCG
1250 CAPSULE ORAL
Status: DISCONTINUED | OUTPATIENT
Start: 2025-05-09 | End: 2025-05-12 | Stop reason: HOSPADM

## 2025-05-09 RX ORDER — POLYETHYLENE GLYCOL 3350 17 G/17G
17 POWDER, FOR SOLUTION ORAL DAILY
Status: ON HOLD | COMMUNITY
Start: 2025-05-09 | End: 2025-05-24

## 2025-05-09 RX ORDER — LEVETIRACETAM 750 MG/1
1500 TABLET ORAL 2 TIMES DAILY
Qty: 120 TABLET | Refills: 0 | Status: ON HOLD | OUTPATIENT
Start: 2025-05-09 | End: 2025-05-24

## 2025-05-09 RX ORDER — CARBOXYMETHYLCELLULOSE SODIUM 5 MG/ML
1 SOLUTION/ DROPS OPHTHALMIC 2 TIMES DAILY
COMMUNITY
Start: 2025-05-09

## 2025-05-09 RX ADMIN — FENOFIBRATE 54 MG: 54 TABLET ORAL at 09:00

## 2025-05-09 RX ADMIN — AZELASTINE 2 SPRAY: 1 SPRAY, METERED NASAL at 09:02

## 2025-05-09 RX ADMIN — ACETAMINOPHEN 650 MG: 325 TABLET ORAL at 11:18

## 2025-05-09 RX ADMIN — LEVETIRACETAM 1500 MG: 750 TABLET, FILM COATED ORAL at 09:00

## 2025-05-09 RX ADMIN — INSULIN ASPART 1 UNITS: 100 INJECTION, SOLUTION INTRAVENOUS; SUBCUTANEOUS at 12:40

## 2025-05-09 RX ADMIN — LEVOTHYROXINE SODIUM 125 MCG: 125 TABLET ORAL at 09:00

## 2025-05-09 RX ADMIN — Medication 1 SPRAY: at 20:23

## 2025-05-09 RX ADMIN — AZELASTINE 2 SPRAY: 1 SPRAY, METERED NASAL at 20:23

## 2025-05-09 RX ADMIN — CHOLECALCIFEROL CAP 1.25 MG (50000 UNIT) 1250 MCG: 1.25 CAP at 15:18

## 2025-05-09 RX ADMIN — ATORVASTATIN CALCIUM 40 MG: 40 TABLET, FILM COATED ORAL at 08:58

## 2025-05-09 RX ADMIN — LEVETIRACETAM 1500 MG: 750 TABLET, FILM COATED ORAL at 20:22

## 2025-05-09 RX ADMIN — SENNOSIDES AND DOCUSATE SODIUM 1 TABLET: 50; 8.6 TABLET ORAL at 09:00

## 2025-05-09 RX ADMIN — Medication 1 DROP: at 20:23

## 2025-05-09 RX ADMIN — Medication 1 DROP: at 09:01

## 2025-05-09 RX ADMIN — CYANOCOBALAMIN TAB 1000 MCG 1000 MCG: 1000 TAB at 09:01

## 2025-05-09 RX ADMIN — INSULIN ASPART 1 UNITS: 100 INJECTION, SOLUTION INTRAVENOUS; SUBCUTANEOUS at 08:53

## 2025-05-09 ASSESSMENT — ACTIVITIES OF DAILY LIVING (ADL)
ADLS_ACUITY_SCORE: 53

## 2025-05-09 NOTE — PLAN OF CARE
[id number]     Up with SBA and cane. Takes pills whole with water. Loose BM today, Miralax held. Pain in lower back rates 5/10. Tylenol given and oncoming RN informed.

## 2025-05-09 NOTE — PLAN OF CARE
Goal Outcome Evaluation:      Plan of Care Reviewed With: patient    Overall Patient Progress: no changeOverall Patient Progress: no change         ..Orientation: A&O x4  Bowel: Continent   LBM: 5/8  Bladder: Continent   Pain: No complaints  Ambulation/Transfers: 1 assist gait belt and cane.  Diet/Liquids: Regular diet, thin liquids   Tubes/Lines/Drains: None  Skin: R crani, sutures TYRELL. Bruising on right side of face.  Other: BG QID and 2am. Carb counts. 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

## 2025-05-09 NOTE — PLAN OF CARE
1100H-2300H  Goal Outcome Evaluation:      Plan of Care Reviewed With: patient    Overall Patient Progress: no change    Orientation: AOX4  Bowel: Continent LBM: 5/9  Bladder: Continent  Pain: back; PRN Tylenol given  Ambulation/Transfers: SBA with FWW  Blood sugars: see results; Insulin given per sliding scale  Diet/Liquids: tolerating diet well; good appetite noted; took pills whole with thin liquids  Tubes/Lines/Drains: none  Skin: R crani incision TYRELL    Placed call light within reach. Bed alarm on for safety. Able to make needs known. Safety ensured at times. Continue POC

## 2025-05-09 NOTE — PROGRESS NOTES
ARU Social Work Progress Notes     Team huddle today; targeting a discharge on 05/12 (Monday) with home care services (PT/OT/SLP). Home Care already set-up with [facility].    JOYCELYN Palacios  [facility], Acute Inpatient Rehab Unit   [address] [place] [address], [address] [place] [address]  Phone: [phone]  Fax: [phone]

## 2025-05-09 NOTE — PLAN OF CARE
Discharge Planner Post-Acute Rehab SLP:     Discharge Plan: Home with family support and ongoing SLP    Precautions: Craniotomy, impulsivity    Current Status:  Hearing: Bilateral hearing aids.  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  Slow processing speed and limited insight into impairments  Swallow: Regular texture diet and thin liquids (0). Not formally evaluated by ARU SLP.    Assessment:  Instructed pt in moderate complexity reasoning task with basic attention component, pt completed with 70% independent accuracy.     Other Barriers to Discharge (Family Training, etc): Ensure adequate level of support for higher level IADLs

## 2025-05-09 NOTE — PROGRESS NOTES
[facility]   Acute Rehabilitation Unit  Daily progress note  Admitted: 5/4/2025  ARU day: 5  Plan for homecare on discharge.        INTERVAL HISTORY  Pascual Perea was seen and examined at bedside this morning.  Nursing notes, any pertinent labs and all vitals were reviewed by me. No acute events reported overnight.  Seen alongside PT this morning.  Reports increasing pain from yesterday.  Low back, no radiation, feels sharp -has not taken any as needed medication this morning.  Reports he fell asleep in his chair after OT this morning.  1 dose of as needed acetaminophen given by nursing, reports improved function from PT. [facility] has no acute medical concerns at this time, looking forward to discharging on Monday.  Discussed discharge medications.  Will plan to have them filled at [place] pharmacy. Denies subjective fever, chills, sore throat, rhinorrhea, CP, SOB, PEREZ, constipation, diarrhea, dysuria. Reports appetite and water intake are adequate.  Reports increased fatigue today, although he states he slept well.           PRNs used in the last 24hs:    Last 24H PRN:     acetaminophen (TYLENOL) tablet 650 mg, 650 mg at 05/09/25 1118       Functional status:    PT:  Much more fatigued today w/ c/o of back pain. Improves w/ lumbar stretching and medication. Transitioned to FWW d/t increased instability, will continue to monitor most appropriate DME for discharge.   OT: increase full body dressing to mod I after assistance with clothes retrieval         MEDICATIONS  Current Facility-Administered Medications   Medication Dose Route Frequency Provider Last Rate Last Admin    artificial saliva (BIOTENE MT) solution 1 spray  1 spray Mouth/Throat At Bedtime Josefina Segovia MD   1 spray at 05/08/25 2127    atorvastatin (LIPITOR) tablet 40 mg  40 mg Oral Daily Suzanne Purdy MD   40 mg at 05/09/25 0858    azelastine (ASTELIN) nasal spray 2 spray  2 spray Both Nostrils BID  Josefina Segovia MD   2 spray at 05/09/25 0902    carboxymethylcellulose PF (REFRESH PLUS) 0.5 % ophthalmic solution 1 drop  1 drop Both Eyes BID Josefina Segovia MD   1 drop at 05/09/25 0901    cyanocobalamin (VITAMIN B-12) tablet 1,000 mcg  1,000 mcg Oral Daily Suzanne Purdy MD   1,000 mcg at 05/09/25 0901    fenofibrate (LOFIBRA) tablet 54 mg  54 mg Oral Daily with breakfast Josefina Segovia MD   54 mg at 05/09/25 0900    insulin aspart (NovoLOG) injection (RAPID ACTING)  1-7 Units Subcutaneous TID AC Suzanne Purdy MD   1 Units at 05/09/25 0853    insulin aspart (NovoLOG) injection (RAPID ACTING)  1-5 Units Subcutaneous At Bedtime Suzanne Purdy MD   1 Units at 05/07/25 2203    insulin glargine (LANTUS PEN) injection 20 Units  20 Units Subcutaneous QAM AC Josefina Segovia MD   20 Units at 05/09/25 0855    [Held by provider] isosorbide mononitrate (IMDUR) 24 hr tablet 30 mg  30 mg Oral Daily Josefina Segovia MD   30 mg at 05/08/25 0825    levETIRAcetam (KEPPRA) tablet 1,500 mg  1,500 mg Oral BID Suzanne Purdy MD   1,500 mg at 05/09/25 0900    levothyroxine (SYNTHROID/LEVOTHROID) tablet 125 mcg  125 mcg Oral Daily Suzanne Purdy MD   125 mcg at 05/09/25 0900    polyethylene glycol (MIRALAX) Packet 17 g  17 g Oral Daily Suzanne Purdy MD   17 g at 05/08/25 0825    senna-docusate (SENOKOT-S/PERICOLACE) 8.6-50 MG per tablet 1 tablet  1 tablet Oral or Feeding Tube Daily Suzanne Purdy MD   1 tablet at 05/09/25 0900          Current Facility-Administered Medications   Medication Dose Route Frequency Provider Last Rate Last Admin    acetaminophen (TYLENOL) tablet 650 mg  650 mg Oral Q4H PRN Suzanne Purdy MD   650 mg at 05/09/25 1118    bisacodyl (DULCOLAX) suppository 10 mg  10 mg Rectal Daily PRN Suzanne Purdy MD        glucose gel 15-30 g  15-30 g Oral Q15 Min PRN Suzanne Purdy MD        Or    dextrose 50 % injection 25-50 mL  25-50 mL Intravenous Q15 Min PRN Suzanne Purdy MD        Or  "   glucagon injection 1 mg  1 mg Subcutaneous Q15 Min PRN Suzanne Purdy MD        hydrALAZINE (APRESOLINE) tablet 10 mg  10 mg Oral Q4H PRN Suzanne Purdy MD        LORazepam (ATIVAN) injection 2 mg  2 mg Intravenous Q5 Min PRN Suzanne Purdy MD        naloxone (NARCAN) injection 0.2 mg  0.2 mg Intravenous Q2 Min PRN Josefina Segovia MD        Or    naloxone (NARCAN) injection 0.4 mg  0.4 mg Intravenous Q2 Min PRN Josefina Segovia MD        Or    naloxone (NARCAN) injection 0.2 mg  0.2 mg Intramuscular Q2 Min PRN Josefina Segovia MD        Or    naloxone (NARCAN) injection 0.4 mg  0.4 mg Intramuscular Q2 Min PRN Josefina Segovia MD        ondansetron (ZOFRAN ODT) ODT tab 4 mg  4 mg Oral Q6H PRN Suzanne Purdy MD        oxyCODONE (ROXICODONE) tablet 5 mg  5 mg Oral Q6H PRN Josefina Segovia MD        Or    oxyCODONE (ROXICODONE) tablet 10 mg  10 mg Oral Q6H PRN Josefina Segovia MD                  PHYSICAL EXAM  /54 (BP Location: Left arm)   Pulse 78   Temp 98.1  F (36.7  C) (Oral)   Resp 16   Ht 1.803 m (5' 11\")   Wt 80 kg (176 lb 5.9 oz)   SpO2 99%   BMI 24.60 kg/m    Gen: Awake alert NAD  HEENT: Mucus membranes moist, grossly intact.  Right crani incision clean dry.  No drainage, edges approximated  Pulm: Breathing comfortably on room air.  No complaints of shortness of breath  Abd: Soft, non tender  Ext: Warm, well perfused   Neuro/MSK: Cranial nerves grossly intact, moving all 4 extremities engaged in conversation -walking with a cane with PT        LABS  CBC RESULTS:   Recent Labs   Lab Test 05/08/25  0815 05/06/25  0635 05/05/25  0755   WBC 2.9* 4.4 3.6*   RBC 3.07* 3.37* 3.14*   HGB 9.2* 10.0* 9.5*   HCT 28.3* 30.2* 28.4*   MCV 92 90 90   MCH 30.0 29.7 30.3   MCHC 32.5 33.1 33.5   RDW 15.9* 15.9* 15.9*   PLT 60* 68* 75*       Last Basic Metabolic Panel:  Recent Labs   Lab Test 05/09/25  0755 05/09/25  0147 05/08/25  2115 05/08/25  1150 05/08/25  0815 05/05/25  1148 " 05/05/25  0755 05/03/25  1144 05/03/25  1136   NA  --   --   --   --  139  --  138  --  139   POTASSIUM  --   --   --   --  3.9  --  4.1  --  4.0   CHLORIDE  --   --   --   --  105  --  103  --  102   CO2  --   --   --   --  23  --  29  --  28   ANIONGAP  --   --   --   --  11  --  6*  --  9   * 151* 183*   < > 179*   < > 92   < > 89   BUN  --   --   --   --  16.2  --  18.7  --  19.1   CR  --   --   --   --  0.88  --  1.09  --  0.97   GFRESTIMATED  --   --   --   --  85  --  67  --  77   STARR  --   --   --   --  9.0  --  9.4  --  9.1    < > = values in this interval not displayed.       CBC RESULTS:   Recent Labs   Lab Test 05/08/25  0815 05/06/25  0635 05/05/25  0755   WBC 2.9* 4.4 3.6*   RBC 3.07* 3.37* 3.14*   HGB 9.2* 10.0* 9.5*   HCT 28.3* 30.2* 28.4*   MCV 92 90 90   MCH 30.0 29.7 30.3   MCHC 32.5 33.1 33.5   RDW 15.9* 15.9* 15.9*   PLT 60* 68* 75*       Imaging  No results found for this visit on 05/04/25.       Rehabilitation - continue comprehensive acute inpatient rehabilitation program with multidisciplinary approach including therapies, rehab nursing, and physiatry following. See interval history for updates.          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 CT head with right temporal lobe mass with associated hemorrhagic aspect, significant surrounding vasogenic edema, and right to left midline shift, later confirmed on MRI. Patient is now s/p right frontotemporal craniotomy for resection of right temporal brain tumor on 4/22/2025. Pathology revealed metastatic  recurrence of high grade neuroendocrine tumor. Post operative course complicated by cardiopulmonary arrest in PACU requiring re-intubation and CPR with ROSC along with seizure in PACU. Deficits include impaired activity tolerance, impaired balance, impaired judgement and safety awareness, impaired strength, impaired tone, and pain. Patient will require and benefit from PT and OT services as well as all of the ancillary services offered in the inpatient rehab setting.      Brain mass with hemorrhage and vasogenic edema s/p R frontotemporal craniotomy and tumor resection (4/22/2025)  Encephalopathy due to brain mass/brain edema  High grade neuroendocrine tumor, large cell type, left parotid gland s/p chemoradiation   R Epileptiform Discharges on EEG  Patient presented on 4/16/2025 with lethargy, somnolence, word-finding/memory difficulties, imbalance with walking, and generalized lower extremity weakness. CT head with 5.3 x 4 x 3.8 cm right temporal lobe mass with associated hemorrhagic aspect, significant surrounding vasogenic edema, and right to left midline shift 6.6 mm. MRI brain with 5.3 x 3.8 x 4.3 cm right temporal lobe mass with extensive surrounding vasogenic edema, hemorrhagic signal within the mass, and right to left midline shift 4 to 5 mm. Pt is now s/p right frontotemporal craniotomy for resection of right temporal brain tumor on 4/22/2025.  Patient with history of neuroendocrine tumor, which was thought to be in remission (follows with [facility] (Dr. Butler)). 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 Neurosurgery, oncology, and neurology followed patient while in acute care. Please see cancer treatment history outlined by Dr. Bravo in his note from 4/23.  Essentially, he notes that after patient received a course of definitive chemoradiation therapy for left parotid small cell carcinoma, a PET CT on 12/23/2024 showed complete metabolic response with no  "evidence of residual disease. Per Dr. Bravo on 4/30, \"Given his limited disease extent and improved clinical status, I discussed recommendations for post-operative stereotactic radiation therapy to the surgical cavity and residual disease to 3000 cGy in 5 fractions.   - Per oncology: Brain MRI and CT simulation in 2-3 weeks pending his discharge from the hospital and rehab with initiation of post-operative stereotactic radiation therapy approximately 1 week later  - Neuro checks PRN  - Platelet goal > 75k.  Plts 75 on 5/5, -->68 on 5/6--> 60 5/8 (reached out to neurosurgery 5/8/25 to confirm goals/ transfusion recommendations given 2+ weeks from surgery -still awaiting callback)  - Now completed Decadron taper as of 5/6  - Wound cares: May leave incision open to air, cover with light gauze if patient is picking at incision  - Pain regimen:              - Tylenol 650mg Q4H PRN              - Oxycodone 5-10mg Q6H PRN   - Keppra 1500mg BID per general neurology  - Goal SBP <150 - hydralazine PRN ordered  - Prior recommendation to resume Eliquis for afib POD#14 (5/6), however given ongoing thrombocytopenia, NSGY recommends continuing to hold.  - Craniotomy precautions  - Lorazepam 2mg IV PRN for seizure >2 minutes  - Zofran 4mg Q6H PRN     Moderate oral and pharyngeal dysphagia  Patient initially had NG tube in place secondary to dysphagia. He was followed by SLP in acute care. His diet was gradually advanced. On 4/28, patient was eating 100% of meals and tube feeds were tapered and discontinued. Feeding tube was removed on 4/29. On 5/1, patient advanced to regular diet with thin liquids. Prior to discharge from acute care, SLP signed off.   - Regular diet with thin liquids     Falls  Back pain, improving  RRT was called x2 on 4/28 for falls, not resulting in significant injury.  The first episode was unwitnessed. Patient reported that his left knee gave way and he \"sat down.\"  The second episode was assisted and patient " "was lowered back onto his bed.  Fall precautions were added. Patient briefly had bedside attendant, but this was discontinued on 4/29. On 4/30, patient reporting back pain. Neurosurgery ordered x-ray of lumbar spine which showed osteopenia, but otherwise unremarkable. Pain improving per patient.  - Fall precautions  - Continue PT/OT  - Up with assist only, including use of gait belt and walker/ cane  - On discharge, will plan to have 24-hour supervision for the first 2 weeks.  Then weaning to assistance with IADLs only  - Abdominal binder PRN for comfort     Coronary artery disease status post CABG  LVH  Mild troponin elevation  Postoperative hypotension, CODE BLUE activated  Troponin 42->45 on admission. Patient denied chest pain. EKG reviewed by cardiology initially due to ST elevations in setting of repolarization abnormality, but not felt to be ischemic. Likely secondary to brain mass/bleed. See postoperative note from Dr. Erickson, patient had \"hypotensive episode following extubation in the PACU to the point that he required activation of CODE BLUE.  Patient also required CPR\" with achievement of ROSC.  Has been hypotensive last 24 hours.    - Continue to Hold IMDUR- monitor bp (SBP )  -Plan to reevaluate with primary care on an outpatient basis     Atrial fibrillation with slow ventricular response  On cardiac monitoring in acute care, which showed evidence of ongoing, asymptomatic bradycardia.  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 Creatinine 1.22 on admission to acute care. -->Cr 0.88 5/8/25  - Avoid nephrotoxins  - BMP qM/Th     Diabetes mellitus, type 2, on long term insulin   HgbA1c 6.9% 2/10/25. Increased to 7.9% on 4/23/25 . Prior to admission " on glargine 40 units BID and aspart 3 units per carb unit. Initially, patient was on insulin drip, which was discontinued on 4/28 with resumption of home regimine, however continued episodes of hypoglycemia carb coverage was discontinued in favor of lower dose lantus and med sliding scale insulin.   - Hypoglycemia protocol   - Glucose levels improved - continue to trend   - continue lantus 20 units   - Medium dose sliding scale insulin (using 0-2 units at a time)  - BGs at 0200 120-150's  - Plan to discharge home with LOW sliding scale coverage and 20units Lantus at HS (ordered)     Hyperlipidemia  - Continue PTA atorvastatin and fenofibrate      Hypothyroidism  - Continue PTA levothyroxine      Osteopenia  Evidenced on 4/30 L-spine XR.  - PCP to assess fracture risk and need for further work-up/treatment on discharge  - Total vitamin D: 16   - Will start cholecalciferol 1250 mcg every 7 days.     Vitamin B12 deficiency  - Continue cyanocobalamin 1000mcg Qday     Dry mouth  - Artifical saliva throat spray QHS (substituted for PTA ACT dry mouth lozenge)     Allergies  - Continue PTA azelastine 2 sprays BID and artifical tears 1 drop BID (substituted for PTA polyethylene glycol drops)     Thrombocytopenia, chronic  -Likely chemotherapy induced, overall stable  -Per discharge summary from hospitalization 10/2024, cleared to resume Eliquis when plts >50K  Per NSGY recommendation, keep Plts >75K. Continue to hold eliquis per neurosurgery   -Plts 68 on 5/6, plan to routinely check qM/Th-->60 5/8     Adjustment to disability: Clinical psychology to eval and treat if indicated   FEN: Regular diet with thin liquids  Bladder: Continent  Bowel: Miralax Qday, senna-docusate 1 tablet Qday, PRN bowel meds available  DVT Prophylaxis: Mechanical.  Holding re-initiation of Eliquis as above  GI Prophylaxis: N/A  Code: Full   Disposition: Goal for home  ELOS/Discharge Date:  Tentative discharge date 5/12  Rehab prognosis:  Good    Follow up Appointments on Discharge:   - Outpatient PCP  - Outpatient PM&R  - Outpatient therapies  - Oncology with brain MRI and CT simulation in 2-3 weeks pending his discharge from the hospital and rehab with initiation of post-operative stereotactic radiation therapy approximately 1 week later  - Neurosurgery  - Neurology in 6-8 weeks        40 minutes spent on the date of service doing chart review, history and exam, documentation, and further activities as noted above.           Patient was discussed with Dr. Garcia, PM&R Staff Physician    JAKI Gonsalves CNP  Physical Medicine & Rehabilitation

## 2025-05-09 NOTE — PLAN OF CARE
Discharge Planner Post-Acute Rehab OT:     Discharge Plan: Home with family support    Precautions: Falls, craniotomy, [place] - hearing aids    Current Status:  ADLs:  Mobility: SBA FWW bed mobility  flat bed supine<>eob no rail sba  Grooming: SBA standing at sink  Dressing: UB ind after clothes retrieval. LB  ind after clothes retrieval  Bathing: CGA extended tub bench.  Toileting: handicap height SBA transfer FWW. CGA hygiene/clothing management. simulated standard toilet transfer on 15.5 inch chair without arms sba x2   IADLs: PLOF IND simple meal prep, med admin, and driving. Family assisted laundry, grocery shopping, housekeeping at baseline.  Vision/Cognition: Mild deficits problem solving, attention, and awareness. [place] cognitive screen showing deficits with executive functioning and visuospatial skills.     Assessment:increase full body dressing to mod I after clothes retrieval    Other Barriers to Discharge (DME, Family Training, etc):   Caregiver support: Lived with son son moved out May 1. Son will stay with dad first week and sister second week family coming in for family training sat May 10  Equipment: Owns a cane and shower chair

## 2025-05-09 NOTE — PLAN OF CARE
"Discharge Plan: home w/ son; HHPT    Precautions: falls, crani, mild impulsivity    Current Status:  Bed Mobility: Mark rolling, SBA sup<>sit  Transfer: SBA w/ FWW  Gait: Up to 250' w/ FWW, SBA  Stairs: 6\" x12, SBA w/ railing  Balance: Stable dynamic sitting and static standing. Requires UE support w/ dynamic stepping    Outcome Measures:   5TSTS:   : 38.3 seconds  10MWT:   : 0.44m/s comfortable pace, CGA with FWW    Assessment: Much more fatigued today w/ c/o of back pain. Improves w/ lumbar stretching and medication. Transitioned to FWW d/t increased instability, will continue to monitor most appropriate DME for discharge.     Other Barriers to Discharge (DME, Family Training, etc):   Caregiver trainin/10 10-12 w/ OT/PT/SLP      "

## 2025-05-09 NOTE — PLAN OF CARE
Goal Outcome Evaluation:      Plan of Care Reviewed With: patient    Overall Patient Progress: no change    Pt alert oriented, calls appropriately. No reports of pain SOB and chest pain. Observed sleeping most of the shift. Safety measures in place. Call light within reach. Will continue with current POC.

## 2025-05-10 ENCOUNTER — APPOINTMENT (OUTPATIENT)
Dept: PHYSICAL THERAPY | Facility: CLINIC | Age: 85
DRG: 843 | End: 2025-05-10
Attending: PHYSICAL MEDICINE & REHABILITATION
Payer: COMMERCIAL

## 2025-05-10 ENCOUNTER — APPOINTMENT (OUTPATIENT)
Dept: OCCUPATIONAL THERAPY | Facility: CLINIC | Age: 85
DRG: 843 | End: 2025-05-10
Attending: PHYSICAL MEDICINE & REHABILITATION
Payer: COMMERCIAL

## 2025-05-10 ENCOUNTER — APPOINTMENT (OUTPATIENT)
Dept: SPEECH THERAPY | Facility: CLINIC | Age: 85
DRG: 843 | End: 2025-05-10
Attending: PHYSICAL MEDICINE & REHABILITATION
Payer: COMMERCIAL

## 2025-05-10 LAB
GLUCOSE BLDC GLUCOMTR-MCNC: 168 MG/DL (ref 70–99)
GLUCOSE BLDC GLUCOMTR-MCNC: 176 MG/DL (ref 70–99)
GLUCOSE BLDC GLUCOMTR-MCNC: 198 MG/DL (ref 70–99)
GLUCOSE BLDC GLUCOMTR-MCNC: 246 MG/DL (ref 70–99)
GLUCOSE BLDC GLUCOMTR-MCNC: 265 MG/DL (ref 70–99)

## 2025-05-10 PROCEDURE — 250N000013 HC RX MED GY IP 250 OP 250 PS 637: Performed by: PHYSICAL MEDICINE & REHABILITATION

## 2025-05-10 PROCEDURE — 92507 TX SP LANG VOICE COMM INDIV: CPT | Mod: GN | Performed by: SPEECH-LANGUAGE PATHOLOGIST

## 2025-05-10 PROCEDURE — 97530 THERAPEUTIC ACTIVITIES: CPT | Mod: GP

## 2025-05-10 PROCEDURE — 99231 SBSQ HOSP IP/OBS SF/LOW 25: CPT | Performed by: PHYSICAL MEDICINE & REHABILITATION

## 2025-05-10 PROCEDURE — 128N000003 HC R&B REHAB

## 2025-05-10 PROCEDURE — 250N000013 HC RX MED GY IP 250 OP 250 PS 637

## 2025-05-10 PROCEDURE — 97535 SELF CARE MNGMENT TRAINING: CPT | Mod: GO

## 2025-05-10 RX ADMIN — Medication 1 SPRAY: at 20:19

## 2025-05-10 RX ADMIN — FENOFIBRATE 54 MG: 54 TABLET ORAL at 08:32

## 2025-05-10 RX ADMIN — INSULIN ASPART 3 UNITS: 100 INJECTION, SOLUTION INTRAVENOUS; SUBCUTANEOUS at 12:34

## 2025-05-10 RX ADMIN — INSULIN ASPART 1 UNITS: 100 INJECTION, SOLUTION INTRAVENOUS; SUBCUTANEOUS at 08:31

## 2025-05-10 RX ADMIN — LEVETIRACETAM 1500 MG: 750 TABLET, FILM COATED ORAL at 08:32

## 2025-05-10 RX ADMIN — INSULIN ASPART 1 UNITS: 100 INJECTION, SOLUTION INTRAVENOUS; SUBCUTANEOUS at 17:24

## 2025-05-10 RX ADMIN — SENNOSIDES AND DOCUSATE SODIUM 1 TABLET: 50; 8.6 TABLET ORAL at 08:32

## 2025-05-10 RX ADMIN — LEVETIRACETAM 1500 MG: 750 TABLET, FILM COATED ORAL at 20:18

## 2025-05-10 RX ADMIN — AZELASTINE 2 SPRAY: 1 SPRAY, METERED NASAL at 20:19

## 2025-05-10 RX ADMIN — Medication 1 DROP: at 20:18

## 2025-05-10 RX ADMIN — Medication 1 DROP: at 08:36

## 2025-05-10 RX ADMIN — CYANOCOBALAMIN TAB 1000 MCG 1000 MCG: 1000 TAB at 08:32

## 2025-05-10 RX ADMIN — AZELASTINE 2 SPRAY: 1 SPRAY, METERED NASAL at 08:31

## 2025-05-10 RX ADMIN — LEVOTHYROXINE SODIUM 125 MCG: 125 TABLET ORAL at 08:32

## 2025-05-10 RX ADMIN — ATORVASTATIN CALCIUM 40 MG: 40 TABLET, FILM COATED ORAL at 08:32

## 2025-05-10 ASSESSMENT — ACTIVITIES OF DAILY LIVING (ADL)
ADLS_ACUITY_SCORE: 53

## 2025-05-10 NOTE — PROGRESS NOTES
[facility]   Acute Rehabilitation Unit  Daily progress note  Admitted: 5/4/2025    Plan for homecare on discharge.        INTERVAL HISTORY  Pascual Perea was seen and examined at bedside this morning. Activity tolerance still an issue. Has loose stools. Some HA +.  Has discharge medications filled at [place] pharmacy. Denies subjective fever, chills, sore throat, rhinorrhea, CP, SOB, PEREZ, constipation, diarrhea, dysuria. Reports appetite and water intake are adequate.            Functional status:    PT:  Much more fatigued today. H/o of back pain. Improves w/ lumbar stretching and medication. Transitioned to FWW d/t increased instability, will continue to monitor most appropriate DME for discharge.   OT: increase full body dressing to mod I after assistance with clothes retrieval         MEDICATIONS  Current Facility-Administered Medications   Medication Dose Route Frequency Provider Last Rate Last Admin    artificial saliva (BIOTENE MT) solution 1 spray  1 spray Mouth/Throat At Bedtime Josefina Segovia MD   1 spray at 05/09/25 2023    atorvastatin (LIPITOR) tablet 40 mg  40 mg Oral Daily Suzanne Purdy MD   40 mg at 05/10/25 0832    azelastine (ASTELIN) nasal spray 2 spray  2 spray Both Nostrils BID Josefina Segovia MD   2 spray at 05/10/25 0831    carboxymethylcellulose PF (REFRESH PLUS) 0.5 % ophthalmic solution 1 drop  1 drop Both Eyes BID Josefina Segovia MD   1 drop at 05/10/25 0836    cholecalciferol (VITAMIN D3) capsule 1,250 mcg  1,250 mcg Oral Q7 Days Daniela Freitas APRN CNP   1,250 mcg at 05/09/25 1518    cyanocobalamin (VITAMIN B-12) tablet 1,000 mcg  1,000 mcg Oral Daily Suzanne Purdy MD   1,000 mcg at 05/10/25 0832    fenofibrate (LOFIBRA) tablet 54 mg  54 mg Oral Daily with breakfast Josefina Segovia MD   54 mg at 05/10/25 0832    insulin aspart (NovoLOG) injection (RAPID ACTING)  1-7 Units Subcutaneous TID AC Suzanne Purdy MD   3  Units at 05/10/25 1234    insulin aspart (NovoLOG) injection (RAPID ACTING)  1-5 Units Subcutaneous At Bedtime Suzanne Purdy MD   2 Units at 05/09/25 2138    insulin glargine (LANTUS PEN) injection 20 Units  20 Units Subcutaneous QAM AC Josefina Segovia MD   20 Units at 05/10/25 0832    [Held by provider] isosorbide mononitrate (IMDUR) 24 hr tablet 30 mg  30 mg Oral Daily Josefina Segovia MD   30 mg at 05/08/25 0825    levETIRAcetam (KEPPRA) tablet 1,500 mg  1,500 mg Oral BID Suzanne Purdy MD   1,500 mg at 05/10/25 0832    levothyroxine (SYNTHROID/LEVOTHROID) tablet 125 mcg  125 mcg Oral Daily Suzanne Purdy MD   125 mcg at 05/10/25 0832    polyethylene glycol (MIRALAX) Packet 17 g  17 g Oral Daily Suzanne Purdy MD   17 g at 05/08/25 0825    senna-docusate (SENOKOT-S/PERICOLACE) 8.6-50 MG per tablet 1 tablet  1 tablet Oral or Feeding Tube Daily Suzanne Purdy MD   1 tablet at 05/10/25 0832          Current Facility-Administered Medications   Medication Dose Route Frequency Provider Last Rate Last Admin    acetaminophen (TYLENOL) tablet 650 mg  650 mg Oral Q4H PRN Suzanne Purdy MD   650 mg at 05/09/25 1118    bisacodyl (DULCOLAX) suppository 10 mg  10 mg Rectal Daily PRN Suzanne Purdy MD        glucose gel 15-30 g  15-30 g Oral Q15 Min PRN Suzanne Purdy MD        Or    dextrose 50 % injection 25-50 mL  25-50 mL Intravenous Q15 Min PRN Suzanne Purdy MD        Or    glucagon injection 1 mg  1 mg Subcutaneous Q15 Min PRN Suzanne Purdy MD        hydrALAZINE (APRESOLINE) tablet 10 mg  10 mg Oral Q4H PRN Suzanne Purdy MD        LORazepam (ATIVAN) injection 2 mg  2 mg Intravenous Q5 Min PRN Suzanne Purdy MD        naloxone (NARCAN) injection 0.2 mg  0.2 mg Intravenous Q2 Min PRN Josefina Segovia Juan, MD        Or    naloxone (NARCAN) injection 0.4 mg  0.4 mg Intravenous Q2 Min PRJosefina Lantigua MD        Or    naloxone (NARCAN) injection 0.2 mg  0.2 mg Intramuscular Q2 Min Josefina Starks  "MD Juan        Or    naloxone (NARCAN) injection 0.4 mg  0.4 mg Intramuscular Q2 Min PRN Josefina Segovia MD        ondansetron (ZOFRAN ODT) ODT tab 4 mg  4 mg Oral Q6H PRN Suzanne Purdy MD        oxyCODONE (ROXICODONE) tablet 5 mg  5 mg Oral Q6H PRN Josefina Segovia MD        Or    oxyCODONE (ROXICODONE) tablet 10 mg  10 mg Oral Q6H PRN Josefina Segovia MD                  PHYSICAL EXAM  /44 (BP Location: Left arm, Patient Position: Semi-Luna's, Cuff Size: Adult Regular)   Pulse (!) 43   Temp 98.5  F (36.9  C) (Oral)   Resp 16   Ht 1.803 m (5' 11\")   Wt 80 kg (176 lb 5.9 oz)   SpO2 96%   BMI 24.60 kg/m    Gen: Awake alert NAD  HEENT: Mucus membranes moist, grossly intact.  Right crani incision clean dry.  No drainage, edges approximated  Pulm: Breathing comfortably on room air.  No complaints of shortness of breath  Abd: Soft, non tender  Ext: Warm, well perfused   Neuro/MSK: Cranial nerves grossly intact, moving all 4 extremities engaged in conversation -walking with a cane with PT        LABS  CBC RESULTS:   Recent Labs   Lab Test 05/08/25  0815 05/06/25  0635 05/05/25  0755   WBC 2.9* 4.4 3.6*   RBC 3.07* 3.37* 3.14*   HGB 9.2* 10.0* 9.5*   HCT 28.3* 30.2* 28.4*   MCV 92 90 90   MCH 30.0 29.7 30.3   MCHC 32.5 33.1 33.5   RDW 15.9* 15.9* 15.9*   PLT 60* 68* 75*       Last Basic Metabolic Panel:  Recent Labs   Lab Test 05/10/25  1216 05/10/25  0740 05/10/25  0150 05/08/25  1150 05/08/25  0815 05/05/25  1148 05/05/25  0755 05/03/25  1144 05/03/25  1136   NA  --   --   --   --  139  --  138  --  139   POTASSIUM  --   --   --   --  3.9  --  4.1  --  4.0   CHLORIDE  --   --   --   --  105  --  103  --  102   CO2  --   --   --   --  23  --  29  --  28   ANIONGAP  --   --   --   --  11  --  6*  --  9   * 176* 246*   < > 179*   < > 92   < > 89   BUN  --   --   --   --  16.2  --  18.7  --  19.1   CR  --   --   --   --  0.88  --  1.09  --  0.97   GFRESTIMATED  --   --   --   --  85  " --  67  --  77   STARR  --   --   --   --  9.0  --  9.4  --  9.1    < > = values in this interval not displayed.       CBC RESULTS:   Recent Labs   Lab Test 05/08/25  0815 05/06/25  0635 05/05/25  0755   WBC 2.9* 4.4 3.6*   RBC 3.07* 3.37* 3.14*   HGB 9.2* 10.0* 9.5*   HCT 28.3* 30.2* 28.4*   MCV 92 90 90   MCH 30.0 29.7 30.3   MCHC 32.5 33.1 33.5   RDW 15.9* 15.9* 15.9*   PLT 60* 68* 75*       Imaging  No results found for this visit on 05/04/25.       Rehabilitation - continue comprehensive acute inpatient rehabilitation program with multidisciplinary approach including therapies, rehab nursing, and physiatry following. See interval history for updates.          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 CT head with right temporal lobe mass with associated hemorrhagic aspect, significant surrounding vasogenic edema, and right to left midline shift, later confirmed on MRI. Patient is now s/p right frontotemporal craniotomy for resection of right temporal brain tumor on 4/22/2025. 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 Post operative course complicated by cardiopulmonary arrest in PACU requiring re-intubation and CPR with ROSC along with seizure in PACU. Deficits include impaired activity tolerance, impaired balance, impaired judgement and safety awareness, impaired strength, impaired tone, and pain. Patient will require and benefit from PT and OT services as well as all of the ancillary services offered in the inpatient rehab setting.      Brain  "mass with hemorrhage and vasogenic edema s/p R frontotemporal craniotomy and tumor resection (4/22/2025)  Encephalopathy due to brain mass/brain edema  High grade neuroendocrine tumor, large cell type, left parotid gland s/p chemoradiation   R Epileptiform Discharges on EEG  Patient presented on 4/16/2025 with lethargy, somnolence, word-finding/memory difficulties, imbalance with walking, and generalized lower extremity weakness. CT head with 5.3 x 4 x 3.8 cm right temporal lobe mass with associated hemorrhagic aspect, significant surrounding vasogenic edema, and right to left midline shift 6.6 mm. MRI brain with 5.3 x 3.8 x 4.3 cm right temporal lobe mass with extensive surrounding vasogenic edema, hemorrhagic signal within the mass, and right to left midline shift 4 to 5 mm. Pt is now s/p right frontotemporal craniotomy for resection of right temporal brain tumor on 4/22/2025.  Patient with history of neuroendocrine tumor, which was thought to be in remission (follows with [facility] (Dr. Butler)). 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 Neurosurgery, oncology, and neurology followed patient while in acute care. Please see cancer treatment history outlined by Dr. Bravo in his note from 4/23.  Essentially, he notes that after patient received a course of definitive chemoradiation therapy for left parotid small cell carcinoma, a PET CT on 12/23/2024 showed complete metabolic response with no evidence of residual disease. Per Dr. Bravo on 4/30, \"Given his limited disease extent and improved clinical status, I discussed recommendations for post-operative stereotactic radiation therapy to the surgical cavity and residual disease to 3000 cGy in 5 fractions.   - Per oncology: Brain MRI and CT simulation in 2-3 weeks pending his discharge from the hospital and rehab with initiation of post-operative stereotactic radiation therapy approximately 1 week later  - Neuro checks " "PRN  - Platelet goal > 75k.  Plts 75 on 5/5, -->68 on 5/6--> 60 5/8 (reached out to neurosurgery 5/8/25 to confirm goals/ transfusion recommendations given 2+ weeks from surgery -still awaiting callback). PLT 60 5/9  - Now completed Decadron taper as of 5/6  - Wound cares: May leave incision open to air, cover with light gauze if patient is picking at incision  - Pain regimen:              - Tylenol 650mg Q4H PRN              - Oxycodone 5-10mg Q6H PRN   - Keppra 1500mg BID per general neurology  - Goal SBP <150 - hydralazine PRN ordered  - Prior recommendation to resume Eliquis for afib POD#14 (5/6), however given ongoing thrombocytopenia, NSGY recommends continuing to hold.  - Craniotomy precautions  - Lorazepam 2mg IV PRN for seizure >2 minutes  - Zofran 4mg Q6H PRN     Moderate oral and pharyngeal dysphagia  Patient initially had NG tube in place secondary to dysphagia. He was followed by SLP in acute care. His diet was gradually advanced. On 4/28, patient was eating 100% of meals and tube feeds were tapered and discontinued. Feeding tube was removed on 4/29. On 5/1, patient advanced to regular diet with thin liquids. Prior to discharge from acute care, SLP signed off.   - Regular diet with thin liquids     Falls  Back pain, improving  RRT was called x2 on 4/28 for falls, not resulting in significant injury.  The first episode was unwitnessed. Patient reported that his left knee gave way and he \"sat down.\"  The second episode was assisted and patient was lowered back onto his bed.  Fall precautions were added. Patient briefly had bedside attendant, but this was discontinued on 4/29. On 4/30, patient reporting back pain. Neurosurgery ordered x-ray of lumbar spine which showed osteopenia, but otherwise unremarkable. Pain improving per patient.  - Fall precautions  - Continue PT/OT  - Up with assist only, including use of gait belt and walker/ cane  - On discharge, will plan to have 24-hour supervision for the " "first 2 weeks.  Then weaning to assistance with IADLs only  - Abdominal binder PRN for comfort     Coronary artery disease status post CABG  LVH  Mild troponin elevation  Postoperative hypotension, CODE BLUE activated  Troponin 42->45 on admission. Patient denied chest pain. EKG reviewed by cardiology initially due to ST elevations in setting of repolarization abnormality, but not felt to be ischemic. Likely secondary to brain mass/bleed. See postoperative note from Dr. Erickson, patient had \"hypotensive episode following extubation in the PACU to the point that he required activation of CODE BLUE.  Patient also required CPR\" with achievement of ROSC.  Has been hypotensive last 24 hours.    - Continue to Hold IMDUR- monitor bp (SBP ) Better now at 107-113.  -Plan to reevaluate with primary care on an outpatient basis     Atrial fibrillation with slow ventricular response  On cardiac monitoring in acute care, which showed evidence of ongoing, asymptomatic bradycardia.  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 Creatinine 1.22 on admission to acute care. -->Cr 0.88 5/8/25  - Avoid nephrotoxins  - BMP qM/Th     Diabetes mellitus, type 2, on long term insulin   HgbA1c 6.9% 2/10/25. Increased to 7.9% on 4/23/25 . Prior to admission on glargine 40 units BID and aspart 3 units per carb unit. Initially, patient was on insulin drip, which was discontinued on 4/28 with resumption of home regimine, however continued episodes of hypoglycemia carb coverage was discontinued in favor of lower dose lantus and med sliding scale insulin.   - Hypoglycemia protocol   - Glucose levels improved - continue to trend   - continue lantus 20 units   - Medium dose sliding scale insulin " (using 0-2 units at a time)  - BGs at 0200 120-150's  - Plan to discharge home with LOW sliding scale coverage and 20units Lantus at HS (ordered)     Hyperlipidemia  - Continue PTA atorvastatin and fenofibrate      Hypothyroidism  - Continue PTA levothyroxine      Osteopenia  Evidenced on 4/30 L-spine XR.  - PCP to assess fracture risk and need for further work-up/treatment on discharge  - Total vitamin D: 16   - cholecalciferol 1250 mcg every 7 days.     Vitamin B12 deficiency  - Continue cyanocobalamin 1000mcg Qday     Dry mouth  - Artifical saliva throat spray QHS (substituted for PTA ACT dry mouth lozenge)     Allergies  - Continue PTA azelastine 2 sprays BID and artifical tears 1 drop BID (substituted for PTA polyethylene glycol drops)     Thrombocytopenia, chronic  -Likely chemotherapy induced, overall stable  -Per discharge summary from hospitalization 10/2024, cleared to resume Eliquis when plts >50K  Per NSGY recommendation, keep Plts >75K. Continue to hold eliquis per neurosurgery   -Plts 68 on 5/6, plan to routinely check qM/Th-->60 5/8     Adjustment to disability: Clinical psychology to eval and treat if indicated   FEN: Regular diet with thin liquids  Bladder: Continent  Bowel: Miralax Qday, senna-docusate 1 tablet Qday, PRN bowel meds available  DVT Prophylaxis: Mechanical.  Holding re-initiation of Eliquis as above  GI Prophylaxis: N/A  Code: Full   Disposition: Goal for home  ELOS/Discharge Date:  Tentative discharge date 5/12  Rehab prognosis:  Good   Follow up Appointments on Discharge:   - Outpatient PCP  - Outpatient PM&R  - Outpatient therapies  - Oncology with brain MRI and CT simulation in 2-3 weeks pending his discharge from the hospital and rehab with initiation of post-operative stereotactic radiation therapy approximately 1 week later  - Neurosurgery  - Neurology in 6-8 weeks      Doing well. Discussed with team. Continue cares and plans outlined.     Roberto Carlos Walters MD

## 2025-05-10 NOTE — PLAN OF CARE
"Discharge Plan: home w/ son; HHPT    Precautions: falls, crani, mild impulsivity    Current Status:  Bed Mobility: Mark rolling, SBA sup<>sit  Transfer: SBA w/ FWW  Gait: Up to 250' w/ FWW, SBA  Stairs: 6\" x12, SBA w/ railing  Balance: Stable dynamic sitting and static standing. Requires UE support w/ dynamic stepping    Outcome Measures:   5TSTS:   /6: 38.3 seconds  10MWT:   56: 0.44m/s comfortable pace, CGA with FWW    Assessment: Ongoing fatigue and greater instability w/ mobility today. Caregiver training w/ son and dtr. Reinforced recommendations for SBA w/ all mobility. D/t ongoing fatigue recommending FWW, will issue through [facility] tomorrow. Family agreeable to provide level of assist pt needs, son to live w/ pt first week and dtr to live with him week 2. Then plan to coordinate w/ HH therapies for weaning assist. Per conversation w/ family, recommending HHA. SW updated.    Other Barriers to Discharge (DME, Family Training, etc):   Caregiver trainin/10 10-12 w/ OT/PT/SLP    DME: issue FWW   "

## 2025-05-10 NOTE — PLAN OF CARE
Discharge Planner Post-Acute Rehab OT:     Discharge Plan: Home with family support    Precautions: Falls, craniotomy, [place] - hearing aids    Current Status:  ADLs:  Mobility: SBA FWW bed mobility  flat bed supine<>eob no rail sba  Grooming: SBA standing at sink  Dressing: UB ind after clothes retrieval. LB  ind after clothes retrieval  Bathing: CGA extended tub bench.  Toileting: handicap height SBA transfer FWW. CGA hygiene/clothing management. simulated standard toilet transfer on 15.5 inch chair without arms sba x2   IADLs: PLOF IND simple meal prep, med admin, and driving. Family assisted laundry, grocery shopping, housekeeping at baseline.  Vision/Cognition: Mild deficits problem solving, attention, and awareness. [place] cognitive screen showing deficits with executive functioning and visuospatial skills.     Assessment:focus on family training with son and daughter educated in equipment pt able to get up/down from 17 inch chair min a mod a steping into shower with 4 inch lip with pt knees buckling and unable to walk to w/c and mod a to pivot into w/c min a w/c to bed robsyomi, pt sba/mod i 5-9  notified of change. family looking at commode for over toilet that also can be used at family wedding     Other Barriers to Discharge (DME, Family Training, etc):   Caregiver support: Lived with son son moved out May 1. Son will stay with dad first week and sister second week family coming in for family training sat May 10  Equipment: Owns a cane and shower chair

## 2025-05-10 NOTE — PLAN OF CARE
Goal Outcome Evaluation:      Plan of Care Reviewed With: patient    Overall Patient Progress: improvingOverall Patient Progress: improving  Patient is A&Ox 4. Able to make need known. Denied SOB, N/V and chest pain . Denied pain or discomfort. SBA with FWW. BG monitored sliding scale insulin given. Takes med's whole with thin liquids. Right cranial incision TYRELL. No noted S/S of infection. Continent of B&B. LBM 5/9. Ate with good appetite. Bed alarm on for safety. Call light with in reach. Continue with POC.         Patient's most recent vital signs are:     Vital signs:  BP: 113/36  Temp: 98.5  HR: 44  RR: 16  SpO2: 96 %     Patient does not have new respiratory symptoms.  Patient does not have new sore throat.  Patient does not have a fever greater than 99.5.

## 2025-05-10 NOTE — PLAN OF CARE
Goal Outcome Evaluation:      Plan of Care Reviewed With: patient    Overall Patient Progress: no change     Pt alert oriented, calls appropriately. No reports of pain SOB and chest pain. Slept  most of the shift. Safety measures in place. Call light within reach. Will continue with current POC.

## 2025-05-10 NOTE — PLAN OF CARE
Discharge Planner Post-Acute Rehab SLP:     Discharge Plan: Home with family support and ongoing SLP    Precautions: Craniotomy, impulsivity    Current Status:  Hearing: Bilateral hearing aids.  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  Slow processing speed and limited insight into impairments  Swallow: Regular texture diet and thin liquids (0). Not formally evaluated by ARU SLP.    Assessment:  Pt seen with daughter and son present for family training. Pt and family edu in cognitive testing and activities with SLP, fatigue is barrier to participation and concern for daily task completion upon discharge from ARU. Pt and family caregivers edu in recommendation for oversight with med box set up, ensure doses taken correctly and consistently at same time, supervision with financial management, no driving until cleared by medical provider. Family endorsed comprehension of education and agreeable to recommendations.      Other Barriers to Discharge (Family Training, etc): oversight with med box set up, ensure doses taken correctly and consistently at same time, supervision with financial management, no driving until cleared by medical provider- completed with daughter Ananya and son Harpreet 05/10.

## 2025-05-11 ENCOUNTER — APPOINTMENT (OUTPATIENT)
Dept: OCCUPATIONAL THERAPY | Facility: CLINIC | Age: 85
DRG: 843 | End: 2025-05-11
Attending: PHYSICAL MEDICINE & REHABILITATION
Payer: COMMERCIAL

## 2025-05-11 ENCOUNTER — APPOINTMENT (OUTPATIENT)
Dept: PHYSICAL THERAPY | Facility: CLINIC | Age: 85
DRG: 843 | End: 2025-05-11
Attending: PHYSICAL MEDICINE & REHABILITATION
Payer: COMMERCIAL

## 2025-05-11 LAB
GLUCOSE BLDC GLUCOMTR-MCNC: 163 MG/DL (ref 70–99)
GLUCOSE BLDC GLUCOMTR-MCNC: 165 MG/DL (ref 70–99)
GLUCOSE BLDC GLUCOMTR-MCNC: 210 MG/DL (ref 70–99)
GLUCOSE BLDC GLUCOMTR-MCNC: 216 MG/DL (ref 70–99)
GLUCOSE BLDC GLUCOMTR-MCNC: 241 MG/DL (ref 70–99)

## 2025-05-11 PROCEDURE — 97530 THERAPEUTIC ACTIVITIES: CPT | Mod: GP

## 2025-05-11 PROCEDURE — 97535 SELF CARE MNGMENT TRAINING: CPT | Mod: GO

## 2025-05-11 PROCEDURE — 99231 SBSQ HOSP IP/OBS SF/LOW 25: CPT | Performed by: PHYSICAL MEDICINE & REHABILITATION

## 2025-05-11 PROCEDURE — 250N000013 HC RX MED GY IP 250 OP 250 PS 637

## 2025-05-11 PROCEDURE — 128N000003 HC R&B REHAB

## 2025-05-11 PROCEDURE — 250N000013 HC RX MED GY IP 250 OP 250 PS 637: Performed by: PHYSICAL MEDICINE & REHABILITATION

## 2025-05-11 RX ADMIN — LEVOTHYROXINE SODIUM 125 MCG: 125 TABLET ORAL at 08:18

## 2025-05-11 RX ADMIN — INSULIN ASPART 2 UNITS: 100 INJECTION, SOLUTION INTRAVENOUS; SUBCUTANEOUS at 12:06

## 2025-05-11 RX ADMIN — LEVETIRACETAM 1500 MG: 750 TABLET, FILM COATED ORAL at 20:34

## 2025-05-11 RX ADMIN — POLYETHYLENE GLYCOL 3350 17 G: 17 POWDER, FOR SOLUTION ORAL at 08:28

## 2025-05-11 RX ADMIN — SENNOSIDES AND DOCUSATE SODIUM 1 TABLET: 50; 8.6 TABLET ORAL at 08:18

## 2025-05-11 RX ADMIN — INSULIN ASPART 3 UNITS: 100 INJECTION, SOLUTION INTRAVENOUS; SUBCUTANEOUS at 18:07

## 2025-05-11 RX ADMIN — Medication 1 DROP: at 20:34

## 2025-05-11 RX ADMIN — ATORVASTATIN CALCIUM 40 MG: 40 TABLET, FILM COATED ORAL at 08:18

## 2025-05-11 RX ADMIN — AZELASTINE 2 SPRAY: 1 SPRAY, METERED NASAL at 09:48

## 2025-05-11 RX ADMIN — ACETAMINOPHEN 650 MG: 325 TABLET ORAL at 12:06

## 2025-05-11 RX ADMIN — CYANOCOBALAMIN TAB 1000 MCG 1000 MCG: 1000 TAB at 08:18

## 2025-05-11 RX ADMIN — FENOFIBRATE 54 MG: 54 TABLET ORAL at 08:18

## 2025-05-11 RX ADMIN — Medication 1 SPRAY: at 20:34

## 2025-05-11 RX ADMIN — LEVETIRACETAM 1500 MG: 750 TABLET, FILM COATED ORAL at 08:18

## 2025-05-11 RX ADMIN — Medication 1 DROP: at 08:18

## 2025-05-11 RX ADMIN — INSULIN ASPART 1 UNITS: 100 INJECTION, SOLUTION INTRAVENOUS; SUBCUTANEOUS at 08:27

## 2025-05-11 RX ADMIN — AZELASTINE 2 SPRAY: 1 SPRAY, METERED NASAL at 20:34

## 2025-05-11 ASSESSMENT — ACTIVITIES OF DAILY LIVING (ADL)
ADLS_ACUITY_SCORE: 53
ADLS_ACUITY_SCORE: 55
ADLS_ACUITY_SCORE: 53
ADLS_ACUITY_SCORE: 55

## 2025-05-11 NOTE — PROGRESS NOTES
[facility]   Acute Rehabilitation Unit  Daily progress note  Admitted: 5/4/2025    Plan for homecare on discharge.  No new concerns. On track for 5/12      INTERVAL HISTORY  Pascual Perea was seen and examined at bedside this morning.  Has discharge medications filled at [place] [facility]. Denies subjective fever, chills, sore throat, rhinorrhea, CP, SOB, PEREZ, constipation, diarrhea, dysuria. Reports appetite and water intake are adequate.            Functional status:    PT:  Has been fatigued. H/o of back pain. Improves w/ lumbar stretching and medication. Transitioned to FWW d/t increased instability, will continue to monitor most appropriate DME for discharge.   OT: increase full body dressing to mod I after assistance with clothes retrieval         MEDICATIONS  Current Facility-Administered Medications   Medication Dose Route Frequency Provider Last Rate Last Admin    artificial saliva (BIOTENE MT) solution 1 spray  1 spray Mouth/Throat At Bedtime Josefina Segovia MD   1 spray at 05/10/25 2019    atorvastatin (LIPITOR) tablet 40 mg  40 mg Oral Daily Suzanne Purdy MD   40 mg at 05/11/25 0818    azelastine (ASTELIN) nasal spray 2 spray  2 spray Both Nostrils BID Josefina Segovia MD   2 spray at 05/11/25 0948    carboxymethylcellulose PF (REFRESH PLUS) 0.5 % ophthalmic solution 1 drop  1 drop Both Eyes BID Josefina Segovia MD   1 drop at 05/11/25 0818    cholecalciferol (VITAMIN D3) capsule 1,250 mcg  1,250 mcg Oral Q7 Days Daniela Freitas APRN CNP   1,250 mcg at 05/09/25 1518    cyanocobalamin (VITAMIN B-12) tablet 1,000 mcg  1,000 mcg Oral Daily Suzanne Purdy MD   1,000 mcg at 05/11/25 0818    fenofibrate (LOFIBRA) tablet 54 mg  54 mg Oral Daily with breakfast Josefina Segovia MD   54 mg at 05/11/25 0818    insulin aspart (NovoLOG) injection (RAPID ACTING)  1-7 Units Subcutaneous TID AC Suzanne Purdy MD   2 Units at 05/11/25 1206    insulin  aspart (NovoLOG) injection (RAPID ACTING)  1-5 Units Subcutaneous At Bedtime Suzanne Purdy MD   2 Units at 05/09/25 2138    insulin glargine (LANTUS PEN) injection 20 Units  20 Units Subcutaneous QAM AC Josefina Segovia MD   20 Units at 05/11/25 0827    [Held by provider] isosorbide mononitrate (IMDUR) 24 hr tablet 30 mg  30 mg Oral Daily Josefina Segovia MD   30 mg at 05/08/25 0825    levETIRAcetam (KEPPRA) tablet 1,500 mg  1,500 mg Oral BID Suzanne Purdy MD   1,500 mg at 05/11/25 0818    levothyroxine (SYNTHROID/LEVOTHROID) tablet 125 mcg  125 mcg Oral Daily Suzanne Purdy MD   125 mcg at 05/11/25 0818    polyethylene glycol (MIRALAX) Packet 17 g  17 g Oral Daily Suzanne Purdy MD   17 g at 05/11/25 0828    senna-docusate (SENOKOT-S/PERICOLACE) 8.6-50 MG per tablet 1 tablet  1 tablet Oral or Feeding Tube Daily Suzanne Purdy MD   1 tablet at 05/11/25 0818          Current Facility-Administered Medications   Medication Dose Route Frequency Provider Last Rate Last Admin    acetaminophen (TYLENOL) tablet 650 mg  650 mg Oral Q4H PRN Suzanne Purdy MD   650 mg at 05/11/25 1206    bisacodyl (DULCOLAX) suppository 10 mg  10 mg Rectal Daily PRN Suzanne Purdy MD        glucose gel 15-30 g  15-30 g Oral Q15 Min PRN Suzanne Purdy MD        Or    dextrose 50 % injection 25-50 mL  25-50 mL Intravenous Q15 Min PRN Suzanne Purdy MD        Or    glucagon injection 1 mg  1 mg Subcutaneous Q15 Min PRN Suzanne Purdy MD        hydrALAZINE (APRESOLINE) tablet 10 mg  10 mg Oral Q4H PRN Suzanne uPrdy MD        LORazepam (ATIVAN) injection 2 mg  2 mg Intravenous Q5 Min PRN Suzanne Purdy MD        naloxone (NARCAN) injection 0.2 mg  0.2 mg Intravenous Q2 Min PRN Josefina Segovia MD        Or    naloxone (NARCAN) injection 0.4 mg  0.4 mg Intravenous Q2 Min PRN Josefina Segovia MD        Or    naloxone (NARCAN) injection 0.2 mg  0.2 mg Intramuscular Q2 Min PRN Josefina Segovia MD        Or    naloxone  "(NARCAN) injection 0.4 mg  0.4 mg Intramuscular Q2 Min PRN Josefina Segovia MD        ondansetron (ZOFRAN ODT) ODT tab 4 mg  4 mg Oral Q6H PRN Suzanne Purdy MD        oxyCODONE (ROXICODONE) tablet 5 mg  5 mg Oral Q6H PRN Josefina Segovia MD        Or    oxyCODONE (ROXICODONE) tablet 10 mg  10 mg Oral Q6H PRN Josefina Segovia MD                  PHYSICAL EXAM  /54 (BP Location: Left arm, Patient Position: Supine, Cuff Size: Adult Regular)   Pulse 56   Temp 98.7  F (37.1  C) (Oral)   Resp 16   Ht 1.803 m (5' 11\")   Wt 80 kg (176 lb 5.9 oz)   SpO2 95%   BMI 24.60 kg/m    Gen: Awake alert NAD  HEENT: Mucus membranes moist, grossly intact.  Right crani incision clean dry.  No drainage, edges approximated  Pulm: Breathing comfortably on room air.  No complaints of shortness of breath  Abd: Soft, non tender  Ext: Warm, well perfused   Neuro/MSK: Cranial nerves grossly intact, moving all 4 extremities engaged in conversation -walking with a cane with PT        LABS  CBC RESULTS:   Recent Labs   Lab Test 05/08/25  0815 05/06/25  0635 05/05/25  0755   WBC 2.9* 4.4 3.6*   RBC 3.07* 3.37* 3.14*   HGB 9.2* 10.0* 9.5*   HCT 28.3* 30.2* 28.4*   MCV 92 90 90   MCH 30.0 29.7 30.3   MCHC 32.5 33.1 33.5   RDW 15.9* 15.9* 15.9*   PLT 60* 68* 75*       Last Basic Metabolic Panel:  Recent Labs   Lab Test 05/11/25  1147 05/11/25  0740 05/11/25  0202 05/08/25  1150 05/08/25  0815 05/05/25  1148 05/05/25  0755 05/03/25  1144 05/03/25  1136   NA  --   --   --   --  139  --  138  --  139   POTASSIUM  --   --   --   --  3.9  --  4.1  --  4.0   CHLORIDE  --   --   --   --  105  --  103  --  102   CO2  --   --   --   --  23  --  29  --  28   ANIONGAP  --   --   --   --  11  --  6*  --  9   * 163* 165*   < > 179*   < > 92   < > 89   BUN  --   --   --   --  16.2  --  18.7  --  19.1   CR  --   --   --   --  0.88  --  1.09  --  0.97   GFRESTIMATED  --   --   --   --  85  --  67  --  77   STARR  --   --   --   --  " 9.0  --  9.4  --  9.1    < > = values in this interval not displayed.       CBC RESULTS:   Recent Labs   Lab Test 05/08/25  0815 05/06/25  0635 05/05/25  0755   WBC 2.9* 4.4 3.6*   RBC 3.07* 3.37* 3.14*   HGB 9.2* 10.0* 9.5*   HCT 28.3* 30.2* 28.4*   MCV 92 90 90   MCH 30.0 29.7 30.3   MCHC 32.5 33.1 33.5   RDW 15.9* 15.9* 15.9*   PLT 60* 68* 75*       Imaging  No results found for this visit on 05/04/25.       Rehabilitation - continue comprehensive acute inpatient rehabilitation program with multidisciplinary approach including therapies, rehab nursing, and physiatry following. See interval history for updates.          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 CT head with right temporal lobe mass with associated hemorrhagic aspect, significant surrounding vasogenic edema, and right to left midline shift, later confirmed on MRI. Patient is now s/p right frontotemporal craniotomy for resection of right temporal brain tumor on 4/22/2025. 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 Post operative course complicated by cardiopulmonary arrest in PACU requiring re-intubation and CPR with ROSC along with seizure in PACU. Deficits include impaired activity tolerance, impaired balance, impaired judgement and safety awareness, impaired strength, impaired tone, and pain. Patient will require and benefit from PT and OT services as well as all of the ancillary services offered in the inpatient rehab setting.      Brain mass with hemorrhage and vasogenic edema  "s/p R frontotemporal craniotomy and tumor resection (4/22/2025)  Encephalopathy due to brain mass/brain edema  High grade neuroendocrine tumor, large cell type, left parotid gland s/p chemoradiation   R Epileptiform Discharges on EEG  Patient presented on 4/16/2025 with lethargy, somnolence, word-finding/memory difficulties, imbalance with walking, and generalized lower extremity weakness. CT head with 5.3 x 4 x 3.8 cm right temporal lobe mass with associated hemorrhagic aspect, significant surrounding vasogenic edema, and right to left midline shift 6.6 mm. MRI brain with 5.3 x 3.8 x 4.3 cm right temporal lobe mass with extensive surrounding vasogenic edema, hemorrhagic signal within the mass, and right to left midline shift 4 to 5 mm. Pt is now s/p right frontotemporal craniotomy for resection of right temporal brain tumor on 4/22/2025.  Patient with history of neuroendocrine tumor, which was thought to be in remission (follows with [facility] (Dr. Butler)). 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 Neurosurgery, oncology, and neurology followed patient while in acute care. Please see cancer treatment history outlined by Dr. Bravo in his note from 4/23.  Essentially, he notes that after patient received a course of definitive chemoradiation therapy for left parotid small cell carcinoma, a PET CT on 12/23/2024 showed complete metabolic response with no evidence of residual disease. Per Dr. Bravo on 4/30, \"Given his limited disease extent and improved clinical status, I discussed recommendations for post-operative stereotactic radiation therapy to the surgical cavity and residual disease to 3000 cGy in 5 fractions.   - Per oncology: Brain MRI and CT simulation in 2-3 weeks pending his discharge from the hospital and rehab with initiation of post-operative stereotactic radiation therapy approximately 1 week later  - Neuro checks PRN  - Platelet goal > 75k.  Plts 75 on " "5/5, -->68 on 5/6--> 60 5/8 (reached out to neurosurgery 5/8/25 to confirm goals/ transfusion recommendations given 2+ weeks from surgery -still awaiting callback). PLT 60 5/9  - Now completed Decadron taper as of 5/6  - Wound cares: May leave incision open to air, cover with light gauze if patient is picking at incision  - Pain regimen:              - Tylenol 650mg Q4H PRN              - Oxycodone 5-10mg Q6H PRN   - Keppra 1500mg BID per general neurology  - Goal SBP <150 - hydralazine PRN ordered  - Prior recommendation to resume Eliquis for afib POD#14 (5/6), however given ongoing thrombocytopenia, NSGY recommends continuing to hold.  - Craniotomy precautions  - Lorazepam 2mg IV PRN for seizure >2 minutes  - Zofran 4mg Q6H PRN     Moderate oral and pharyngeal dysphagia  Patient initially had NG tube in place secondary to dysphagia. He was followed by SLP in acute care. His diet was gradually advanced. On 4/28, patient was eating 100% of meals and tube feeds were tapered and discontinued. Feeding tube was removed on 4/29. On 5/1, patient advanced to regular diet with thin liquids. Prior to discharge from acute care, SLP signed off.   - Regular diet with thin liquids     Falls  Back pain, improving  RRT was called x2 on 4/28 for falls, not resulting in significant injury.  The first episode was unwitnessed. Patient reported that his left knee gave way and he \"sat down.\"  The second episode was assisted and patient was lowered back onto his bed.  Fall precautions were added. Patient briefly had bedside attendant, but this was discontinued on 4/29. On 4/30, patient reporting back pain. Neurosurgery ordered x-ray of lumbar spine which showed osteopenia, but otherwise unremarkable. Pain improving per patient.  - Fall precautions  - Continue PT/OT  - Up with assist only, including use of gait belt and walker/ cane  - On discharge, will plan to have 24-hour supervision for the first 2 weeks.  Then weaning to assistance " "with IADLs only  - Abdominal binder PRN for comfort     Coronary artery disease status post CABG  LVH  Mild troponin elevation  Postoperative hypotension, CODE BLUE activated  Troponin 42->45 on admission. Patient denied chest pain. EKG reviewed by cardiology initially due to ST elevations in setting of repolarization abnormality, but not felt to be ischemic. Likely secondary to brain mass/bleed. See postoperative note from Dr. Erickson, patient had \"hypotensive episode following extubation in the PACU to the point that he required activation of CODE BLUE.  Patient also required CPR\" with achievement of ROSC.  Has been hypotensive last 24 hours.    - Continue to Hold IMDUR- monitor bp (SBP ) Better now at 107-113.  -Plan to reevaluate with primary care on an outpatient basis     Atrial fibrillation with slow ventricular response  On cardiac monitoring in acute care, which showed evidence of ongoing, asymptomatic bradycardia.  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 Creatinine 1.22 on admission to acute care. -->Cr 0.88 5/8/25  - Avoid nephrotoxins  - BMP qM/Th     Diabetes mellitus, type 2, on long term insulin   HgbA1c 6.9% 2/10/25. Increased to 7.9% on 4/23/25 . Prior to admission on glargine 40 units BID and aspart 3 units per carb unit. Initially, patient was on insulin drip, which was discontinued on 4/28 with resumption of home regimine, however continued episodes of hypoglycemia carb coverage was discontinued in favor of lower dose lantus and med sliding scale insulin.   - Hypoglycemia protocol   - Glucose levels improved - continue to trend   - continue lantus 20 units   - Medium dose sliding scale insulin (using 0-2 units at a time)  - BGs at 0200 " 120-150's  - Plan to discharge home with LOW sliding scale coverage and 20units Lantus at HS (ordered)     Hyperlipidemia  - Continue PTA atorvastatin and fenofibrate      Hypothyroidism  - Continue PTA levothyroxine      Osteopenia  Evidenced on 4/30 L-spine XR.  - PCP to assess fracture risk and need for further work-up/treatment on discharge  - Total vitamin D: 16   - cholecalciferol 1250 mcg every 7 days.     Vitamin B12 deficiency  - Continue cyanocobalamin 1000mcg Qday     Dry mouth  - Artifical saliva throat spray QHS (substituted for PTA ACT dry mouth lozenge)     Allergies  - Continue PTA azelastine 2 sprays BID and artifical tears 1 drop BID (substituted for PTA polyethylene glycol drops)     Thrombocytopenia, chronic  -Likely chemotherapy induced, overall stable  -Per discharge summary from hospitalization 10/2024, cleared to resume Eliquis when plts >50K  Per NSGY recommendation, keep Plts >75K. Continue to hold eliquis per neurosurgery   -Plts 68 on 5/6, plan to routinely check qM/Th-->60 5/8     Adjustment to disability: Clinical psychology to eval and treat if indicated   FEN: Regular diet with thin liquids  Bladder: Continent  Bowel: Miralax Qday, senna-docusate 1 tablet Qday, PRN bowel meds available  DVT Prophylaxis: Mechanical.  Holding re-initiation of Eliquis as above  GI Prophylaxis: N/A  Code: Full   Disposition: Goal for home  ELOS/Discharge Date:  Tentative discharge date 5/12  Rehab prognosis:  Good   Follow up Appointments on Discharge:   - Outpatient PCP  - Outpatient PM&R  - Outpatient therapies  - Oncology with brain MRI and CT simulation in 2-3 weeks pending his discharge from the hospital and rehab with initiation of post-operative stereotactic radiation therapy approximately 1 week later  - Neurosurgery  - Neurology in 6-8 weeks      Doing well. Discussed with team. Continue cares and plans outlined.     Roberto Carlos Walters MD

## 2025-05-11 NOTE — PLAN OF CARE
Physical Therapy Discharge Summary    Reason for therapy discharge:    Change in medical status.    Progress towards therapy goal(s). See goals on Care Plan in [facility] electronic health record for goal details.  Goals partially met.  Barriers to achieving goals:   discharge from facility.    Therapy recommendation(s):    Continued therapy is recommended.  Rationale/Recommendations:  Discharge back to hospital d/t change in medical status. Pt has supportive family who are able to provide ~2 weeks supervision once medically stable prn .    Outcome Measures:   5TSTS:   5/6: 38.3 seconds  10MWT:   5/6: 0.44m/s comfortable pace, CGA with FWW   What Type Of Note Output Would You Prefer (Optional)?: Standard Output Hpi Title: Evaluation of Skin Lesions How Severe Are Your Spot(S)?: mild

## 2025-05-11 NOTE — PROGRESS NOTES
Speech Language Therapy Discharge Summary    Reason for therapy discharge:    Discharged to home with home therapy.    Progress towards therapy goal(s). See goals on Care Plan in [facility] electronic health record for goal details.  Goals partially met.  Barriers to achieving goals:   limited tolerance for therapy and discharge from facility.    Therapy recommendation(s):    Continued therapy is recommended.  Rationale/Recommendations:  Ongoing SLP for cognitive-linguistic interventions s/p R frontotemporal tumor resection.    Status 05/10:  Hearing: Bilateral hearing aids.  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  Slow processing speed and limited insight into impairments  Swallow: Regular texture diet and thin liquids (0). Not formally evaluated by ARU SLP.

## 2025-05-11 NOTE — PLAN OF CARE
Discharge Planner Post-Acute Rehab OT:     Discharge Plan: Home with family support    Precautions: Falls, craniotomy, [place] - hearing aids    Current Status:  ADLs:  Mobility: SBA FWW bed mobility  flat bed supine<>eob no rail sba  Grooming: SBA standing at sink  Dressing: UB cga for balance leaning to L LB  min a  Bathing: CGA extended tub bench.  Toileting: handicap height cga transfer FWW. CGA hygiene/clothing management.handicap height toilet  simulated standard toilet transfer on 15.5 inch chair without arms sba x2   IADLs: PLOF IND simple meal prep, med admin, and driving. Family assisted laundry, grocery shopping, housekeeping at baseline.  Vision/Cognition: Mild deficits problem solving, attention, and awareness. [place] cognitive screen showing deficits with executive functioning and visuospatial skills.     Assessment:pt declined shower or sponge bath required more assit with ambultion to bathroom cga and adjusting pants up and down per nursing last night needed to use bernard adam pt on 5-9 was sba-mod i working toward mod i with toiletinhg and dressing with marked decline and increase fatigue talked with nursing and md consulted with therapsit on change  0T was able to ambulate with cga  and dress with cga u/b and min a l/b  Other Barriers to Discharge (DME, Family Training, etc):   Caregiver support: Lived with son son moved out May 1. Son will stay with dad first week and sister second week family coming in for family training sat May 10  Equipment: Owns a cane and shower chair

## 2025-05-11 NOTE — PLAN OF CARE
Goal Outcome Evaluation:      Plan of Care Reviewed With: patient    Overall Patient Progress: no change     Pt alert oriented, calls appropriately. No reports of pain SOB and chest pain. Continent of both bowel and bladder.. @ around 5 am pt wanted to use the bathroom, RN assisted the pt using a walker, but pt unable to sit properly at the edge of the bed, assisted to stand,unable to find his balance, escalated to CN assisted pt back to bed, RN uses bernard stedy for transfer. At around  7 AM  pt ws able to walk to bathroom. Slept in between cares. Closely monitored the pt for increasing fatigue, pt continues to denies feeling weak or tired. Regular thin whole with good appetite. Safety measures in place. Call light within reach. Will continue with current POC.

## 2025-05-11 NOTE — PROGRESS NOTES
YARELIS emailed Aundrea with [facility] to request a home health aide be added to pt's home care orders. On track to discharge home tomorrow, 5/12.    Home Health Care:  [facility]  PH: [phone]  Nurse, physical therapy, occupational therapy, speech therapy, and a home health aide    JAKE Villarreal  Post Acute Float   [facility]    Phone: [phone]  Fax: [phone]

## 2025-05-11 NOTE — PLAN OF CARE
[id number]  Goal Outcome Evaluation:      Plan of Care Reviewed With: patient    Overall Patient Progress: no change    Orientation: AOX4  Bowel: Continent LBM: 5/9  Bladder: Continent  Pain: denied  Ambulation/Transfers: SBA with FWW  Blood sugars: see results; Insulin given per sliding scale  Diet/Liquids: tolerating diet well; good appetite noted; took pills whole with thin liquids  Tubes/Lines/Drains: none  Skin: R crani incision TYRELL     Placed call light within reach. Bed alarm on for safety. Able to make needs known. Safety ensured at times. Continue POC

## 2025-05-12 ENCOUNTER — APPOINTMENT (OUTPATIENT)
Dept: CT IMAGING | Facility: CLINIC | Age: 85
DRG: 064 | End: 2025-05-12
Payer: COMMERCIAL

## 2025-05-12 ENCOUNTER — APPOINTMENT (OUTPATIENT)
Dept: CT IMAGING | Facility: CLINIC | Age: 85
End: 2025-05-12
Payer: COMMERCIAL

## 2025-05-12 ENCOUNTER — HOSPITAL ENCOUNTER (INPATIENT)
Facility: CLINIC | Age: 85
DRG: 064 | End: 2025-05-12
Attending: INTERNAL MEDICINE
Payer: COMMERCIAL

## 2025-05-12 VITALS
SYSTOLIC BLOOD PRESSURE: 118 MMHG | RESPIRATION RATE: 18 BRPM | DIASTOLIC BLOOD PRESSURE: 47 MMHG | OXYGEN SATURATION: 98 % | HEIGHT: 71 IN | HEART RATE: 64 BPM | WEIGHT: 176.37 LBS | TEMPERATURE: 98.3 F | BODY MASS INDEX: 24.69 KG/M2

## 2025-05-12 DIAGNOSIS — I10 ESSENTIAL HYPERTENSION, BENIGN: ICD-10-CM

## 2025-05-12 DIAGNOSIS — N18.31 TYPE 2 DIABETES MELLITUS WITH STAGE 3A CHRONIC KIDNEY DISEASE, WITH LONG-TERM CURRENT USE OF INSULIN (H): ICD-10-CM

## 2025-05-12 DIAGNOSIS — K59.00 CONSTIPATION, UNSPECIFIED CONSTIPATION TYPE: ICD-10-CM

## 2025-05-12 DIAGNOSIS — E11.22 TYPE 2 DIABETES MELLITUS WITH STAGE 3A CHRONIC KIDNEY DISEASE, WITH LONG-TERM CURRENT USE OF INSULIN (H): ICD-10-CM

## 2025-05-12 DIAGNOSIS — Z71.89 OTHER SPECIFIED COUNSELING: Chronic | ICD-10-CM

## 2025-05-12 DIAGNOSIS — Z79.4 TYPE 2 DIABETES MELLITUS WITH STAGE 3A CHRONIC KIDNEY DISEASE, WITH LONG-TERM CURRENT USE OF INSULIN (H): ICD-10-CM

## 2025-05-12 DIAGNOSIS — G93.6 VASOGENIC BRAIN EDEMA (H): ICD-10-CM

## 2025-05-12 DIAGNOSIS — Z98.890 S/P CRANIOTOMY: Primary | ICD-10-CM

## 2025-05-12 PROBLEM — I62.9 INTRACRANIAL HEMORRHAGE (H): Status: ACTIVE | Noted: 2025-05-12

## 2025-05-12 LAB
ANION GAP SERPL CALCULATED.3IONS-SCNC: 10 MMOL/L (ref 7–15)
ATRIAL RATE - MUSE: 234 BPM
BASOPHILS # BLD AUTO: 0 10E3/UL (ref 0–0.2)
BASOPHILS NFR BLD AUTO: 0 %
BLD PROD TYP BPU: NORMAL
BLD PROD TYP BPU: NORMAL
BLOOD COMPONENT TYPE: NORMAL
BLOOD COMPONENT TYPE: NORMAL
BUN SERPL-MCNC: 11.4 MG/DL (ref 8–23)
CALCIUM SERPL-MCNC: 9.1 MG/DL (ref 8.8–10.4)
CHLORIDE SERPL-SCNC: 105 MMOL/L (ref 98–107)
CODING SYSTEM: NORMAL
CODING SYSTEM: NORMAL
CREAT SERPL-MCNC: 1.02 MG/DL (ref 0.67–1.17)
DIASTOLIC BLOOD PRESSURE - MUSE: NORMAL MMHG
EGFRCR SERPLBLD CKD-EPI 2021: 73 ML/MIN/1.73M2
EOSINOPHIL # BLD AUTO: 0.1 10E3/UL (ref 0–0.7)
EOSINOPHIL NFR BLD AUTO: 3 %
ERYTHROCYTE [DISTWIDTH] IN BLOOD BY AUTOMATED COUNT: 15.5 % (ref 10–15)
ERYTHROCYTE [DISTWIDTH] IN BLOOD BY AUTOMATED COUNT: 15.5 % (ref 10–15)
GLUCOSE BLDC GLUCOMTR-MCNC: 165 MG/DL (ref 70–99)
GLUCOSE BLDC GLUCOMTR-MCNC: 173 MG/DL (ref 70–99)
GLUCOSE BLDC GLUCOMTR-MCNC: 190 MG/DL (ref 70–99)
GLUCOSE BLDC GLUCOMTR-MCNC: 320 MG/DL (ref 70–99)
GLUCOSE SERPL-MCNC: 177 MG/DL (ref 70–99)
HCO3 SERPL-SCNC: 23 MMOL/L (ref 22–29)
HCT VFR BLD AUTO: 25.7 % (ref 40–53)
HCT VFR BLD AUTO: 27.3 % (ref 40–53)
HGB BLD-MCNC: 8.7 G/DL (ref 13.3–17.7)
HGB BLD-MCNC: 8.7 G/DL (ref 13.3–17.7)
IMM GRANULOCYTES # BLD: 0 10E3/UL
IMM GRANULOCYTES NFR BLD: 0 %
INR PPP: 1.14 (ref 0.85–1.15)
INTERPRETATION ECG - MUSE: NORMAL
ISSUE DATE AND TIME: NORMAL
ISSUE DATE AND TIME: NORMAL
LYMPHOCYTES # BLD AUTO: 0.5 10E3/UL (ref 0.8–5.3)
LYMPHOCYTES NFR BLD AUTO: 21 %
MAGNESIUM SERPL-MCNC: 1.6 MG/DL (ref 1.7–2.3)
MCH RBC QN AUTO: 29.6 PG (ref 26.5–33)
MCH RBC QN AUTO: 30.6 PG (ref 26.5–33)
MCHC RBC AUTO-ENTMCNC: 31.9 G/DL (ref 31.5–36.5)
MCHC RBC AUTO-ENTMCNC: 33.9 G/DL (ref 31.5–36.5)
MCV RBC AUTO: 91 FL (ref 78–100)
MCV RBC AUTO: 93 FL (ref 78–100)
MONOCYTES # BLD AUTO: 0.3 10E3/UL (ref 0–1.3)
MONOCYTES NFR BLD AUTO: 13 %
NEUTROPHILS # BLD AUTO: 1.6 10E3/UL (ref 1.6–8.3)
NEUTROPHILS NFR BLD AUTO: 64 %
NRBC # BLD AUTO: 0 10E3/UL
NRBC BLD AUTO-RTO: 0 /100
P AXIS - MUSE: NORMAL DEGREES
PHOSPHATE SERPL-MCNC: 2.5 MG/DL (ref 2.5–4.5)
PLATELET # BLD AUTO: 49 10E3/UL (ref 150–450)
PLATELET # BLD AUTO: 76 10E3/UL (ref 150–450)
POTASSIUM SERPL-SCNC: 4.1 MMOL/L (ref 3.4–5.3)
PR INTERVAL - MUSE: NORMAL MS
PROTHROMBIN TIME: 14.3 SECONDS (ref 11.8–14.8)
QRS DURATION - MUSE: 128 MS
QT - MUSE: 432 MS
QTC - MUSE: 459 MS
R AXIS - MUSE: 58 DEGREES
RADIOLOGIST FLAGS: ABNORMAL
RBC # BLD AUTO: 2.84 10E6/UL (ref 4.4–5.9)
RBC # BLD AUTO: 2.94 10E6/UL (ref 4.4–5.9)
SODIUM SERPL-SCNC: 138 MMOL/L (ref 135–145)
SYSTOLIC BLOOD PRESSURE - MUSE: NORMAL MMHG
T AXIS - MUSE: 218 DEGREES
UNIT ABO/RH: NORMAL
UNIT ABO/RH: NORMAL
UNIT NUMBER: NORMAL
UNIT NUMBER: NORMAL
UNIT STATUS: NORMAL
UNIT STATUS: NORMAL
UNIT TYPE ISBT: 6200
UNIT TYPE ISBT: 6200
VENTRICULAR RATE- MUSE: 68 BPM
WBC # BLD AUTO: 2.6 10E3/UL (ref 4–11)
WBC # BLD AUTO: 2.7 10E3/UL (ref 4–11)

## 2025-05-12 PROCEDURE — 250N000013 HC RX MED GY IP 250 OP 250 PS 637

## 2025-05-12 PROCEDURE — 70450 CT HEAD/BRAIN W/O DYE: CPT | Mod: XE

## 2025-05-12 PROCEDURE — 85025 COMPLETE CBC W/AUTO DIFF WBC: CPT

## 2025-05-12 PROCEDURE — 82962 GLUCOSE BLOOD TEST: CPT

## 2025-05-12 PROCEDURE — 93010 ELECTROCARDIOGRAM REPORT: CPT | Performed by: INTERNAL MEDICINE

## 2025-05-12 PROCEDURE — 258N000003 HC RX IP 258 OP 636

## 2025-05-12 PROCEDURE — 36415 COLL VENOUS BLD VENIPUNCTURE: CPT

## 2025-05-12 PROCEDURE — 85610 PROTHROMBIN TIME: CPT

## 2025-05-12 PROCEDURE — 70450 CT HEAD/BRAIN W/O DYE: CPT

## 2025-05-12 PROCEDURE — P9037 PLATE PHERES LEUKOREDU IRRAD: HCPCS

## 2025-05-12 PROCEDURE — 82947 ASSAY GLUCOSE BLOOD QUANT: CPT

## 2025-05-12 PROCEDURE — 84100 ASSAY OF PHOSPHORUS: CPT

## 2025-05-12 PROCEDURE — 99223 1ST HOSP IP/OBS HIGH 75: CPT | Mod: FS | Performed by: PHYSICIAN ASSISTANT

## 2025-05-12 PROCEDURE — P9035 PLATELET PHERES LEUKOREDUCED: HCPCS

## 2025-05-12 PROCEDURE — 84132 ASSAY OF SERUM POTASSIUM: CPT

## 2025-05-12 PROCEDURE — 99223 1ST HOSP IP/OBS HIGH 75: CPT

## 2025-05-12 PROCEDURE — 83735 ASSAY OF MAGNESIUM: CPT

## 2025-05-12 PROCEDURE — 85041 AUTOMATED RBC COUNT: CPT

## 2025-05-12 PROCEDURE — 93005 ELECTROCARDIOGRAM TRACING: CPT

## 2025-05-12 PROCEDURE — 250N000013 HC RX MED GY IP 250 OP 250 PS 637: Performed by: PHYSICAL MEDICINE & REHABILITATION

## 2025-05-12 PROCEDURE — 250N000011 HC RX IP 250 OP 636

## 2025-05-12 PROCEDURE — 99239 HOSP IP/OBS DSCHRG MGMT >30: CPT | Performed by: PHYSICAL MEDICINE & REHABILITATION

## 2025-05-12 PROCEDURE — 200N000001 HC R&B ICU

## 2025-05-12 PROCEDURE — 93010 ELECTROCARDIOGRAM REPORT: CPT | Mod: XP | Performed by: INTERNAL MEDICINE

## 2025-05-12 PROCEDURE — 70450 CT HEAD/BRAIN W/O DYE: CPT | Mod: 26 | Performed by: RADIOLOGY

## 2025-05-12 PROCEDURE — 85018 HEMOGLOBIN: CPT

## 2025-05-12 RX ORDER — AMOXICILLIN 250 MG
2 CAPSULE ORAL 2 TIMES DAILY PRN
Status: DISCONTINUED | OUTPATIENT
Start: 2025-05-12 | End: 2025-05-25 | Stop reason: HOSPADM

## 2025-05-12 RX ORDER — ONDANSETRON 4 MG/1
4 TABLET, ORALLY DISINTEGRATING ORAL EVERY 6 HOURS PRN
Status: DISCONTINUED | OUTPATIENT
Start: 2025-05-12 | End: 2025-05-25 | Stop reason: HOSPADM

## 2025-05-12 RX ORDER — HYDRALAZINE HYDROCHLORIDE 20 MG/ML
10-20 INJECTION INTRAMUSCULAR; INTRAVENOUS EVERY 30 MIN PRN
Status: DISCONTINUED | OUTPATIENT
Start: 2025-05-12 | End: 2025-05-25 | Stop reason: HOSPADM

## 2025-05-12 RX ORDER — SALIVA STIMULANT COMB. NO.3
1 SPRAY, NON-AEROSOL (ML) MUCOUS MEMBRANE AT BEDTIME
Status: DISCONTINUED | OUTPATIENT
Start: 2025-05-12 | End: 2025-05-25 | Stop reason: HOSPADM

## 2025-05-12 RX ORDER — AZELASTINE 1 MG/ML
2 SPRAY, METERED NASAL 2 TIMES DAILY
Status: DISCONTINUED | OUTPATIENT
Start: 2025-05-12 | End: 2025-05-25 | Stop reason: HOSPADM

## 2025-05-12 RX ORDER — CARBOXYMETHYLCELLULOSE SODIUM 5 MG/ML
1 SOLUTION/ DROPS OPHTHALMIC 2 TIMES DAILY
Status: DISCONTINUED | OUTPATIENT
Start: 2025-05-12 | End: 2025-05-25 | Stop reason: HOSPADM

## 2025-05-12 RX ORDER — DEXTROSE MONOHYDRATE 25 G/50ML
25-50 INJECTION, SOLUTION INTRAVENOUS
Status: DISCONTINUED | OUTPATIENT
Start: 2025-05-12 | End: 2025-05-25 | Stop reason: HOSPADM

## 2025-05-12 RX ORDER — ACETAMINOPHEN 325 MG/1
650 TABLET ORAL EVERY 4 HOURS PRN
Status: DISCONTINUED | OUTPATIENT
Start: 2025-05-12 | End: 2025-05-18

## 2025-05-12 RX ORDER — SODIUM CHLORIDE 9 MG/ML
INJECTION, SOLUTION INTRAVENOUS
Status: DISCONTINUED
Start: 2025-05-12 | End: 2025-05-12 | Stop reason: HOSPADM

## 2025-05-12 RX ORDER — ACETAMINOPHEN 325 MG/1
650 TABLET ORAL EVERY 4 HOURS PRN
Status: CANCELLED | DISCHARGE
Start: 2025-05-12

## 2025-05-12 RX ORDER — LABETALOL HYDROCHLORIDE 5 MG/ML
10-20 INJECTION, SOLUTION INTRAVENOUS EVERY 10 MIN PRN
Status: DISCONTINUED | OUTPATIENT
Start: 2025-05-12 | End: 2025-05-25 | Stop reason: HOSPADM

## 2025-05-12 RX ORDER — FENOFIBRATE 54 MG/1
54 TABLET ORAL
Status: DISCONTINUED | OUTPATIENT
Start: 2025-05-13 | End: 2025-05-25 | Stop reason: HOSPADM

## 2025-05-12 RX ORDER — NICOTINE POLACRILEX 4 MG
15-30 LOZENGE BUCCAL
Status: DISCONTINUED | OUTPATIENT
Start: 2025-05-12 | End: 2025-05-25 | Stop reason: HOSPADM

## 2025-05-12 RX ORDER — ONDANSETRON 2 MG/ML
4 INJECTION INTRAMUSCULAR; INTRAVENOUS EVERY 6 HOURS PRN
Status: DISCONTINUED | OUTPATIENT
Start: 2025-05-12 | End: 2025-05-25 | Stop reason: HOSPADM

## 2025-05-12 RX ORDER — AMOXICILLIN 250 MG
1 CAPSULE ORAL 2 TIMES DAILY PRN
Status: DISCONTINUED | OUTPATIENT
Start: 2025-05-12 | End: 2025-05-25 | Stop reason: HOSPADM

## 2025-05-12 RX ORDER — LEVOTHYROXINE SODIUM 125 UG/1
125 TABLET ORAL DAILY
Status: DISCONTINUED | OUTPATIENT
Start: 2025-05-13 | End: 2025-05-25 | Stop reason: HOSPADM

## 2025-05-12 RX ORDER — ACETAMINOPHEN 325 MG/10.15ML
650 LIQUID ORAL EVERY 4 HOURS PRN
Status: DISCONTINUED | OUTPATIENT
Start: 2025-05-12 | End: 2025-05-18

## 2025-05-12 RX ORDER — ATORVASTATIN CALCIUM 40 MG/1
40 TABLET, FILM COATED ORAL DAILY
Status: DISCONTINUED | OUTPATIENT
Start: 2025-05-13 | End: 2025-05-25 | Stop reason: HOSPADM

## 2025-05-12 RX ORDER — LIDOCAINE 40 MG/G
CREAM TOPICAL
Status: DISCONTINUED | OUTPATIENT
Start: 2025-05-12 | End: 2025-05-12 | Stop reason: HOSPADM

## 2025-05-12 RX ORDER — POLYETHYLENE GLYCOL 3350 17 G/17G
17 POWDER, FOR SOLUTION ORAL DAILY PRN
Status: DISCONTINUED | OUTPATIENT
Start: 2025-05-12 | End: 2025-05-25 | Stop reason: HOSPADM

## 2025-05-12 RX ADMIN — INSULIN ASPART 4 UNITS: 100 INJECTION, SOLUTION INTRAVENOUS; SUBCUTANEOUS at 11:49

## 2025-05-12 RX ADMIN — Medication 1 DROP: at 08:48

## 2025-05-12 RX ADMIN — LEVOTHYROXINE SODIUM 125 MCG: 125 TABLET ORAL at 08:49

## 2025-05-12 RX ADMIN — LEVETIRACETAM 1500 MG: 100 INJECTION, SOLUTION INTRAVENOUS at 22:17

## 2025-05-12 RX ADMIN — CARBOXYMETHYLCELLULOSE SODIUM 1 DROP: 5 SOLUTION/ DROPS OPHTHALMIC at 20:32

## 2025-05-12 RX ADMIN — CYANOCOBALAMIN TAB 1000 MCG 1000 MCG: 1000 TAB at 08:48

## 2025-05-12 RX ADMIN — AZELASTINE 2 SPRAY: 1 SPRAY, METERED NASAL at 08:48

## 2025-05-12 RX ADMIN — INSULIN ASPART 1 UNITS: 100 INJECTION, SOLUTION INTRAVENOUS; SUBCUTANEOUS at 08:45

## 2025-05-12 RX ADMIN — AZELASTINE HYDROCHLORIDE 2 SPRAY: 137 SPRAY, METERED NASAL at 20:50

## 2025-05-12 RX ADMIN — FENOFIBRATE 54 MG: 54 TABLET ORAL at 08:49

## 2025-05-12 RX ADMIN — LEVETIRACETAM 1500 MG: 750 TABLET, FILM COATED ORAL at 08:49

## 2025-05-12 RX ADMIN — ATORVASTATIN CALCIUM 40 MG: 40 TABLET, FILM COATED ORAL at 08:48

## 2025-05-12 RX ADMIN — Medication 1 SPRAY: at 22:13

## 2025-05-12 ASSESSMENT — ACTIVITIES OF DAILY LIVING (ADL)
ADLS_ACUITY_SCORE: 53
ADLS_ACUITY_SCORE: 63
ADLS_ACUITY_SCORE: 53
ADLS_ACUITY_SCORE: 63
ADLS_ACUITY_SCORE: 53
ADLS_ACUITY_SCORE: 63
ADLS_ACUITY_SCORE: 53
ADLS_ACUITY_SCORE: 63
ADLS_ACUITY_SCORE: 63
ADLS_ACUITY_SCORE: 53
ADLS_ACUITY_SCORE: 53
ADLS_ACUITY_SCORE: 63
ADLS_ACUITY_SCORE: 53

## 2025-05-12 ASSESSMENT — COLUMBIA-SUICIDE SEVERITY RATING SCALE - C-SSRS
2. HAVE YOU ACTUALLY HAD ANY THOUGHTS OF KILLING YOURSELF IN THE PAST MONTH?: NO
6. HAVE YOU EVER DONE ANYTHING, STARTED TO DO ANYTHING, OR PREPARED TO DO ANYTHING TO END YOUR LIFE?: NO
1. IN THE PAST MONTH, HAVE YOU WISHED YOU WERE DEAD OR WISHED YOU COULD GO TO SLEEP AND NOT WAKE UP?: NO

## 2025-05-12 NOTE — PHARMACY-ADMISSION MEDICATION HISTORY
Pharmacist Admission Medication History    Admission medication history is complete. The information provided in this note is only as accurate as the sources available at the time of the update.    Information Source(s): [facility]  via N/A    Pertinent Information: None    Changes made to PTA medication list:  Added: None  Deleted: None  Changed: None  - Pt had been on Apixaban 5 mg BID previously - this was discontinued on previous discharge 4/18/24 - Per notes plan is to resume when OK with neurosurgery - this was not added back to PTA med list at this time     Allergies reviewed with patient and updates made in EHR: no    Medication History Completed By: Lesa Olsen [facility] 5/12/2025 5:49 PM    PTA Med List   Medication Sig Last Dose/Taking    acetaminophen (TYLENOL) 325 MG tablet Take 2 tablets (650 mg) by mouth every 4 hours as needed for mild pain or fever. 5/11/2025    artificial saliva (BIOTENE MT) SOLN solution Take 1 spray by mouth at bedtime. 5/11/2025 Bedtime    atorvastatin (LIPITOR) 40 MG tablet TAKE 1 TABLET (40 MG) BY MOUTH DAILY 5/12/2025 Morning    azelastine 137 MCG/SPRAY SOLN [place] 2 sprays into both nostrils 2 times daily. 5/12/2025 Morning    carboxymethylcellulose PF (REFRESH PLUS) 0.5 % ophthalmic solution Place 1 drop into both eyes 2 times daily. 5/12/2025 Morning    cholecalciferol (VITAMIN D3) 1250 mcg (85933 units) capsule Take 1 capsule (1,250 mcg) by mouth every 7 days. Taking    cyanocobalamin (VITAMIN B-12) 1000 MCG tablet Take 1 tablet (1,000 mcg) by mouth daily. 5/9/2025    fenofibrate (LOFIBRA) 54 MG tablet Take 1 tablet (54 mg) by mouth daily (with breakfast). 5/12/2025 Morning    insulin aspart (NOVOLOG PEN) 100 UNIT/ML pen Inject 1-3 Units subcutaneously 3 times daily (before meals). Correction Scale - LOW INSULIN RESISTANCE DOSING   Do Not give Correction Insulin if Pre-Meal BG less than 140. For Pre-Meal  - 239 give 1  unit. For Pre-Meal  - 339 give 2 units. For Pre-Meal BG greater than or equal to 340 give 3 units.  If patient is NOT eating a meal: Blood glucose should still be checked and correction (sliding scale) insulin to be administered within 30 minutes if order parameters are met. Taking    insulin glargine (LANTUS PEN) 100 UNIT/ML pen Inject 20 Units subcutaneously every morning (before breakfast). 5/12/2025 Morning    levETIRAcetam (KEPPRA) 750 MG tablet Take 2 tablets (1,500 mg) by mouth 2 times daily. 5/12/2025 Morning    levothyroxine (SYNTHROID/LEVOTHROID) 125 MCG tablet Take 1 tablet (125 mcg) by mouth daily 5/12/2025 Morning    polyethylene glycol (MIRALAX) 17 GM/Dose powder Take 17 g by mouth daily. 5/11/2025    senna-docusate (SENOKOT-S/PERICOLACE) 8.6-50 MG tablet Take 1 tablet by mouth or Feeding Tube 2 times daily as needed for constipation. 5/12/2025 Morning

## 2025-05-12 NOTE — PROGRESS NOTES
Contacted by Daniela Freitas CNP regarding 84M known to [facility] for right fronto-temporal craniotomy with neoplasm excision with Dr. Erickson on 4/22/2025. Per report he was doing well until Friday afternoon when it was reported he had a gradual decline in functioning at ARU requiring additional assistance. Per CNP, he was repviously requiring modified independent assistance of a cane which progressed to needing walker assistance. Exam worsened to include generaized malaise, lethargy and possible left facial droop and generalized weakness. Platelet this AM 49. Did not resume Eliquis yet.     Narrative & Impression   EXAM: CT HEAD W/O CONTRAST  5/12/2025 11:02 AM      HISTORY: Decreased level of functioning/cognition over the last few  days. Hx recurrent neuroendocrine tumor, last dose of steroid 5/6.        COMPARISON: 4/23/2025.     TECHNIQUE: Using multidetector thin collimation helical acquisition  technique, axial, coronal and sagittal CT images from the skull base  to the vertex were obtained without intravenous contrast.   (topogram) image(s) also obtained and reviewed. Dose reduction  techniques were used.     FINDINGS:  Increased size and hyperattenuation of the heterogeneous mass in the  right temporal lobe measuring 4.1 x 4.1 cm (2/13), previously 2.8 x  2.5 cm using similar measuring technique. Similar surrounding  vasogenic edema. Increased partial effacement of the right lateral  ventricle and leftward midline shift measuring 5 mm, previously 3 mm.  Postoperative changes of right frontotemporal parietal craniotomy with  slightly increased mixed density extra-axial collection underlying the  craniotomy site measuring up to 4 mm, previously 2 mm. Acute  hemorrhage within the left lateral ventricle, new from prior. Punctate  foci of residual pneumocephalus in the anterior right middle cranial  fossa. No CT findings of acute infarct or hydrocephalus. Vascular  calcifications.     No acute osseous  abnormality. No substantial paranasal sinus mucosal  disease. Clear mastoid air cells. Bilateral pseudophakia.                                                                       IMPRESSION:   1. Increased size and attenuation of the right temporal lobe  hemorrhagic mass since the recent CT from 4/23/2025, concerning for  acute hemorrhage within the mass. New left intraventricular  hemorrhage.  2. Increased leftward midline shift of 5 mm, previously 3 mm, and  partial effacement of the right lateral ventricle.  3. Slightly increased size of the thin mixed density extra-axial  collection underlying the right craniotomy site.     [Urgent Result: Increased size and hemorrhage within the right  temporal lobe mass and leftward midline shift]       Discussed with Dr. Erickson and Daniela Freitas CNP    Recommendations:   -Transfer to [facility] for ICU admission via hospitalist  -Continue to hold anticoagulation  -Platelet transfusion, goal 75  -Neuros q2 hours  -Repeat head CT in 6 hours to ensure stability  -Neurosurgery will see in consultation    Maria T Baker, LIBERTY  [facility] Neurosurgery  Securely message with Heartbeat (more info)  Text page via [employer] Paging/Directory

## 2025-05-12 NOTE — PROGRESS NOTES
ARU Social Work Progress Notes     Patient's discharge has been pushed back pending medical work-up.      JOYCELYN Palacios  [facility], Acute Inpatient Rehab Unit   [facility] [address], [address] [place] [address]  Phone: [phone]  Fax: [phone]

## 2025-05-12 NOTE — PROGRESS NOTES
Pt is alert and oriented x 4 , denies pain. Assist of 1-2 with walker and GB. Pt received platelets transfusion today,VSS . Pt was picked by transport at 4:39 pm transferred to [facility] . Handoff report was already given by outgoing RN.

## 2025-05-12 NOTE — PLAN OF CARE
Occupational Therapy Discharge Summary    Reason for therapy discharge:    Discharged to acute hospital due to change in medical status.    Progress towards therapy goal(s). See goals on Care Plan in Epic electronic health record for goal details.  Goals not met.  Barriers to achieving goals:   discharge from facility.    Therapy recommendation(s):    Continued therapy is recommended.  Rationale/Recommendations:  Recommend continued skilled OT intervention to address goals for discharge home with support when medically appropriate.

## 2025-05-12 NOTE — PLAN OF CARE
"Goal Outcome Evaluation:      Plan of Care Reviewed With: patient    Overall Patient Progress: improvingOverall Patient Progress: improving    VS: /59 (BP Location: Left arm)   Pulse 83   Temp 98  F (36.7  C) (Oral)   Resp 18   Ht 1.803 m (5' 11\")   Wt 80 kg (176 lb 5.9 oz)   SpO2 96%   BMI 24.60 kg/m       O2: SpO2 96% on RA. Denies chest pain and SOB.    Output: Cont x 2   Last BM: 5/11   Activity: Up with A x 1 with a walker   Skin: WDL    Pain: Denies pain    CMS: Intact, Alert and oriented. Denies numbness and tingling.   Dressing: None    Diet: Regular diet, thin liquids takes pills whole. Denies nausea/vomiting.   BG checks before meals and at bedtime. BG check at bedtime, BG overnight    LDA: None    Equipment: IV pole, personal belongings,    Plan: Continue with plan of care. Call light within reach, pt able to make needs known. Alarm on    Additional Info: Slept well through the night with no concern.   Patient will be discharging home today 5/12         "

## 2025-05-12 NOTE — PROGRESS NOTES
ARU Social Work Progress Notes   Accepting Agency     Home Health Care:  [facility]  PH: [phone]  Nurse, physical therapy, occupational therapy, speech therapy, and a home health aide  -You will get a call after you have discharged from [facility] to schedule the first home care visit. The home health nurse should come out within the first 24-48 hours. If you don't get a call from them within the first 48 hours, please call to follow up.       JOYCELYN Palacios  [facility], Acute Inpatient Rehab Unit   [address] [place] [address], [address] [place] [address]  Phone: [phone]  Fax: [phone]

## 2025-05-12 NOTE — PROGRESS NOTES
"Transfer Type: [facility]  Transfer Triage Note    Date of call: 05/12/25  Time of call: 1:10 PM    Current Patient Location:  ARU  Current Level of Care: ARU    Vitals: Temp: 97.8  F (36.6  C) Temp src: Oral BP: 128/45 Pulse: 67   Resp: 18 SpO2: 96 % Height: 180.3 cm (5' 11\") Weight: 80 kg (176 lb 5.9 oz)  O2 Device: None (Room air) at    Diagnosis: brain mass with hemorrhage, altered mental status   Reason for requested transfer: Procedure can be done here and not at referring hospital  Further diagnostic work up, management, and consultation for specialized care   Isolation Needs: None    Care everywhere has been updated and reviewed: N/A  Necessary images have been sent through PACS: N/A    If patient is transferring for specialty care or specific procedure, the specialist required has participated in the transfer call and agreed with need for transfer and anticipated timeline: Yes, Provider name: [facility] specialty with: neurosurgery and Dr. Carr with ICU triage     Transfer accepted: Yes  Stability of Patient: Patient is at risk for instability, however patient requires urgent transfer and does not meet ICU criteria   Does the patient require placement on the [facility]? No.  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  Improved enough to go to ARU and was making excellent functional improvements and was due for discharge today but then developed worsening functional status and ?facial droop, CT showed:    1. Increased size and attenuation of the right temporal lobe  hemorrhagic mass since " the recent CT from 4/23/2025, concerning for  acute hemorrhage within the mass. New left intraventricular  hemorrhage.  2. Increased leftward midline shift of 5 mm, previously 3 mm, and  partial effacement of the right lateral ventricle.  3. Slightly increased size of the thin mixed density extra-axial  collection underlying the right craniotomy site.    At present, patient is somnolent but arousable to physical stimulus not voice, then went back to sleep.  Patient still is full code but discussions to be held with family.   Plts 49, transfusion underway.      ARU team to have some discussions with family regarding goals of care but patient does need to transfer now.  Does appear stable at this time but at risk of worsening.    To notify the admitting provider that the patient has arrived at their destination:    Notify ICU attending       Jaycob Toribio MD

## 2025-05-12 NOTE — H&P
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 Admitted to ICU with hospital medicine as primary.       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 Discharged to rehab 5/4, then with gradual decline starting 5/9, increased weakness and somnolence with CT head with increased R temporal lobe hemorrahgic mass and new left intraventricular hemorrhage, transferred to [facility] ICU.  *Repeat CT stable, discussed with neurosurgery no surgical intervention planned, steroids not needed at this time.   - Admit to ICU  - Neurosurgery consulted  - q 2 neuro checks  - Transfuse platelets to goal greater than 75K, consented  - SBP goal <150, PRNs ordered  - Continue Keppra 1500 mg twice daily  - Steroids not indicated per neurosurgery  - HOB 30 degrees   - Seizure precautions  - Okay for diet   - PT/OT/SLP    High grade neuroendocrine tumor, large  cell type, left parotid gland s/p chemoradiation   Hx of neuroendocrine tumor as above, follows with [facility] (Dr. Butler), previously felt to be in remission.  Evaluated by radiation oncology during previous admission, given limited disease extent had recommended postoperative stereotactic radiation therapy to surgical cavity in 5 fractions, they had planned to repeat imaging 2 to 3 weeks after discharge and initiate treatment about 1 week later.  - Heme/onc consulted  - Consider radiation oncology consult when appropriate     Acute on chronic Thrombocytopenia  Previous admission platelts , chronic previously attributed to chemotherapy. Transfusion goal >75 with active intracranial bleed. Received platelets x2 5/12- recheck level of 76 obtained prior to 2nd transfusion being complete.  - Transfuse PRN to >75, consented  - Monitor  - heme/onc consulted     Atrial fibrillation with slow ventricular response  Previous admission had ongoing asymptomatic bradycardia without evidence of higher degree block, remained in A-fib. Intermittently bradycardic to 50s without mental status changes or hypertension here, less likely Cushing's reflex. EKG appears consistent with afib with slow ventricular response.   - Monitor closely  - Hold apixaban     Moderate oral and pharyngeal dysphagia  Noted on previous admission, evaluated by SLP and nutrition, diet has been advanced to regular with thin liquids   - SLP consult given acute change      Coronary artery disease status post CABG  LVH  Postoperative hypotension, CODE BLUE activated  Of note, had hypotensive episode following extubation in the PACU to the point that he required code blue and CPR.       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 Creatinine 1.02 on admission.  - Monitor      Diabetes mellitus, type 2, on long term insulin   HgbA1c 7.9% 4/2025. Discharged with glargine 20 units BID with sliding scale, rehab decreased to 20 units  daily. Did have issues with hypoglycemia previous admission.  - While NPO q4hr checks with medium dose sliding scale insulin  - continue glargine at reduced 15u, monitor   - hypoglycemia protocol     Hyperlipidemia  - Continue PTA atorvastatin, fenofibrate      Hypothyroidism  - Continue PTA levothyroxine              Diet: NPO for Medical/Clinical Reasons Except for: Meds    DVT Prophylaxis: Pneumatic Compression Devices  Duran Catheter: Not present  Lines: None     Cardiac Monitoring: ACTIVE order. Indication: ICU  Code Status: Full Code      Clinically Significant Risk Factors Present on Admission             # Hypomagnesemia: Lowest Mg = 1.6 mg/dL in last 2 days, will replace as needed     # Thrombocytopenia: Lowest platelets = 49 in last 2 days, will monitor for bleeding   # Hypertension: Noted on problem list      # Anemia: based on hgb <11      # DMII: A1C = 7.9 % (Ref range: <5.7 %) within past 6 months       # Financial/Environmental Concerns:           Disposition Plan     Medically Ready for Discharge: Anticipated in 2-4 Days    SonHarpreet updated           Armand Leal MD  Hospitalist Service  [facility]  Securely message with [employer] (more info)  Text page via [employer] Paging/Directory     ______________________________________________________________________    Chief Complaint   Increased head bleed    History is obtained from the patient and chart review    History of Present Illness   Pascual Perea is a 84 year old male with previous neuroendocrine tumor of parotid gland previously in remission who presented in April with increased weakness found to have likely metastatic right temporal brain tumor, admitted 4/22 - 5/4 for planned resection with neurosurgery, discharged to acute rehab.  Had been weaned off of steroids last dose of dexamethasone 5/6, remained on Keppra, apixaban continued to be held for thrombocytopenia (initially had planned to resume postop day 14 on 5/6).      Had been doing well until 5/9, then reportedly had slowly declined and functioning requiring additional assistance-previously required cane then needed walker, increased malaise and lethargy generalized weakness, CT head obtained which showed increased size and attenuation in right temporal lobe hemorrhagic mass concerning for acute hemorrhage and new left intraventricular hemorrhage, increased leftward midline shift 5 mm (previously 3 mm).  Patient transferred to [facility] ICU for further evaluation/monitoring.    Upon arrival to [facility] ICU, patient alert and oriented, with mild headache, denies any other complaints including chest pain, shortness of breath, palpitations, abdominal pain, focal numbness or weakness, fevers or chills.      Past Medical History    Past Medical History:   Diagnosis Date    Adhesive capsulitis of shoulder     Allergic rhinitis 01/07/2008    Problem list name updated by automated process. Provider to review      Anemia 03/10/2014    Anemia, unspecified     Antiplatelet or antithrombotic long-term use     Arrhythmia     Atrial fib/flutter    Atrial fibrillation, unspecified type (H) 05/02/2018    CAD (coronary artery disease)     Chemotherapy-induced neutropenia 07/19/2024    Coronary artery disease involving native coronary artery of native heart without angina pectoris 02/16/2025    Coronary atherosclerosis     Nuc Stress 4/15/16: On both stress and rest images there is a very small size mild intensity apical photopenic defect which is of equal extent and intensity on both stress and rest images. Gated images demonstrated no regional wall motion abnormalities. The left ventricular ejection fraction was calculated to be 61% with stress.      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    History of cardioversion  04/24/2018    a single synchronized shock of 120 joules restored normal sinus rhythm    History of cardioversion 02/13/2019    single shock , 200 joules successul in restoring NSR    History of colonic polyps 03/24/2017    Hyperlipidemia LDL goal <70 05/26/2011    Hypertension     Hypothyroidism     Hypothyroidism, unspecified type 08/15/2016    Impotence of organic origin     Laceration of finger 06/2010     left thumb s/p sutures    Numbness and tingling     diabetic neuropathy bilateral feet    BRANDON (obstructive sleep apnea)     intolerant of CPAP, uses it occasionally.  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 disease, with long-term current use of insulin (H) 02/16/2025    Type 2 diabetes mellitus without complication  (goal A1C<8) 10/24/2015    Typical atrial flutter (H) 04/18/2016    Unspecified hypothyroidism     Vitamin D deficiency 09/03/2011       Past Surgical History   Past Surgical History:   Procedure Laterality Date    ANESTHESIA CARDIOVERSION N/A 02/13/2019    Procedure: ANESTHESIA CARDIOVERSION;  Surgeon: GENERIC ANESTHESIA PROVIDER;  Location:  OR    ANESTHESIA CARDIOVERSION N/A 05/22/2019    Procedure: ANESTHESIA, FOR CARDIOVERSION;  Surgeon: GENERIC ANESTHESIA PROVIDER;  Location:  OR    CARDIAC SURGERY      bypass in 1997    CARDIOVERSION  04/24/2018    COLONOSCOPY      CORONARY ANGIOGRAPHY ADULT ORDER      4/2/2012    CORONARY ARTERY BYPASS  1997    [facility]    ENDOSCOPIC ULTRASOUND UPPER GASTROINTESTINAL TRACT (GI) N/A 03/14/2019    Procedure: ENDOSCOPIC ULTRASOUND OF  UPPER GASTROINTESTINAL TRACT;  Surgeon: Ju Torres MD;  Location:  OR    ESOPHAGOSCOPY, GASTROSCOPY, DUODENOSCOPY (EGD), COMBINED N/A 4/15/2022    Procedure: ESOPHAGOGASTRODUODENOSCOPY (EGD);  Surgeon: Erik Baca DO;  Location:  GI    EXCISE MASS HEAD Left 08/18/2016    Procedure: EXCISE MASS HEAD;  Surgeon: Ivan Hernandez MD;  Location: [facility]    HEART CATH, ANGIOPLASTY  04/2012    IR CHEST PORT PLACEMENT > 5 YRS OF AGE  8/9/2024    IR PORT REMOVAL RIGHT  2/17/2025    LAPAROSCOPIC CHOLECYSTECTOMY N/A 03/17/2019    Procedure: LAPAROSCOPIC CHOLECYSTECTOMY;  Surgeon: Janel Paz MD;  Location:  OR    OPTICAL TRACKING SYSTEM CRANIOTOMY, EXCISE TUMOR, COMBINED Right 4/22/2025    Procedure: RIGHT FRONTO-TEMPORAL CRANIOTOMY, USING OPTICAL TRACKING SYSTEM, WITH NEOPLASM EXCISION;  Surgeon: Davie Erickson MD;  Location:  OR    PHACOEMULSIFICATION CLEAR CORNEA WITH STANDARD INTRAOCULAR LENS IMPLANT Left 06/08/2015    Procedure: PHACOEMULSIFICATION CLEAR CORNEA WITH STANDARD INTRAOCULAR LENS IMPLANT;  Surgeon: Nixon Farnsworth MD;  Location:  EC    PHACOEMULSIFICATION CLEAR CORNEA WITH STANDARD INTRAOCULAR LENS IMPLANT Right 06/22/2015    Procedure: PHACOEMULSIFICATION CLEAR CORNEA WITH STANDARD INTRAOCULAR LENS IMPLANT;  Surgeon: Nixon Farnsworth MD;  Location:  EC    SEPTOPLASTY, TURBINOPLASTY, COMBINED Bilateral 08/18/2016    Procedure: COMBINED SEPTOPLASTY, TURBINOPLASTY;  Surgeon: Ivan Hernandez MD;  Location: [facility]    Z NONSPECIFIC PROCEDURE  1997    CABG ([employer]ism)    [facility] NONSPECIFIC PROCEDURE  08/2001    stress echo       Prior to Admission Medications   Prior to Admission Medications   Prescriptions Last Dose Informant Patient Reported? Taking?   Artificial Saliva (ACT DRY MOUTH) LOZG Not Taking Self Yes No   Sig: Take 1 lozenge by mouth at bedtime.   Patient not taking: Reported on 5/12/2025   Continuous Glucose  (FREESTYLE DELFINA 2 READER) CHRYSTAL   Self No No   Si Device continuously.   Continuous Glucose Sensor (FREESTYLE DELFINA 2 SENSOR) MISC  Self No No   Sig: APPLY 1 SENSOR AND CHANGE EVERY 14 DAYS AS DIRECTED   acetaminophen (TYLENOL) 325 MG tablet 2025  No Yes   Sig: Take 2 tablets (650 mg) by mouth every 4 hours as needed for mild pain or fever.   artificial saliva (BIOTENE MT) SOLN solution 2025 Bedtime  No Yes   Sig: Take 1 spray by mouth at bedtime.   atorvastatin (LIPITOR) 40 MG tablet 2025 Morning Self No Yes   Sig: TAKE 1 TABLET (40 MG) BY MOUTH DAILY   azelastine 137 MCG/SPRAY SOLN 2025 Morning Self No Yes   Sig: Spray 2 sprays into both nostrils 2 times daily.   blood glucose (ACCU-CHEK NORMAN PLUS) test strip  Self No No   Sig: USE TO TEST BLOOD SUGAR 3 TIMES A DAY OR AS DIRECTED   blood glucose calibration (ACCU-CHEK NORMAN) solution  Self No No   Sig: USE AS DIRECTED   carboxymethylcellulose PF (REFRESH PLUS) 0.5 % ophthalmic solution 2025 Morning  No Yes   Sig: Place 1 drop into both eyes 2 times daily.   cholecalciferol (VITAMIN D3) 1250 mcg (45030 units) capsule   No Yes   Sig: Take 1 capsule (1,250 mcg) by mouth every 7 days.   cyanocobalamin (VITAMIN B-12) 1000 MCG tablet 2025 Self Yes Yes   Sig: Take 1 tablet (1,000 mcg) by mouth daily.   fenofibrate (LOFIBRA) 54 MG tablet 2025 Morning  No Yes   Sig: Take 1 tablet (54 mg) by mouth daily (with breakfast).   insulin aspart (NOVOLOG PEN) 100 UNIT/ML pen   No Yes   Sig: Inject 1-3 Units subcutaneously 3 times daily (before meals). Correction Scale - LOW INSULIN RESISTANCE DOSING   Do Not give Correction Insulin if Pre-Meal BG less than 140. For Pre-Meal  - 239 give 1 unit. For Pre-Meal  - 339 give 2 units. For Pre-Meal BG greater than or equal to 340 give 3 units.  If patient is NOT eating a meal: Blood glucose should still be checked and correction (sliding scale) insulin to be administered within 30 minutes if order parameters are met.    insulin glargine (LANTUS PEN) 100 UNIT/ML pen 2025 Morning  No Yes   Sig: Inject 20 Units subcutaneously every morning (before breakfast).   insulin pen needle (GLOBAL EASE INJECT PEN NEEDLES) 31G X 5 MM miscellaneous  Self No No   Sig: Use one pen needle 5 times a day   levETIRAcetam (KEPPRA) 750 MG tablet 2025 Morning  No Yes   Sig: Take 2 tablets (1,500 mg) by mouth 2 times daily.   levothyroxine (SYNTHROID/LEVOTHROID) 125 MCG tablet 2025 Morning Self No Yes   Sig: Take 1 tablet (125 mcg) by mouth daily   polyethylene glycol (MIRALAX) 17 GM/Dose powder 2025  No Yes   Sig: Take 17 g by mouth daily.   polyethylene glycol 400 (BLINK TEARS) 0.25 % SOLN ophthalmic solution Not Taking Self Yes No   Sig: Place 1 drop into both eyes daily.   Patient not taking: Reported on 2025   senna-docusate (SENOKOT-S/PERICOLACE) 8.6-50 MG tablet 2025 Morning  No Yes   Sig: Take 1 tablet by mouth or Feeding Tube 2 times daily as needed for constipation.      Facility-Administered Medications: None        Review of Systems    The 5 point Review of Systems is negative other than noted in the HPI or here.     Social History   I have reviewed this patient's social history and updated it with pertinent information if needed.  Social History     Tobacco Use    Smoking status: Former     Current packs/day: 0.00     Average packs/day: 1 pack/day for 6.5 years (6.5 ttl pk-yrs)     Types: Cigarettes, Cigars, Pipe     Start date:      Quit date: 1964     Years since quittin.9    Smokeless tobacco: Never    Tobacco comments:     10/16/24 Pt states he will an occasional cigar.    Vaping Use    Vaping status: Never Used   Substance Use Topics    Alcohol use: Not Currently     Comment: couple drinks a year    Drug use: No        Physical Exam   Vital Signs: Temp: 98.1  F (36.7  C) Temp src: Oral BP: 102/48 Pulse: 51     SpO2: 100 % O2 Device: None (Room air)    Weight: 0 lbs 0 oz    Constitutional:  awake, alert, cooperative, NAD, laying in bed  HEENT: Cephalic incision CDI, anicteric sclerae, MMM   Pulmonary: no increased work of breathing on room air, lungs clear to auscultation bilaterally without wheezes or rales   Cardiovascular: Irregular rhythm, normal rate, normal S1 and S2, no murmur appreciated   GI: BS+, soft, non-tender, non-distended, without guarding or rigidity  Skin: warm, dry  MSK: no peripheral edema bilaterally   Neuro:   Mental status: Tired but awake, alert, attentive, oriented to self, year but not month, place, and circumstance.   Cranial nerves: PERRL, conjugate gaze, EOMI, facial sensation intact, possible small left facial droop, shoulder shrug strong, tongue/uvula midline, no dysarthria.   Motor: Normal bulk and tone. No abnormal movements. 5/5 strength bilaterally in deltoids, biceps, triceps, hand , hip flexors, hip extensors, knee flexion, knee extension, plantarflexion, dorsiflexion.   Sensory: Intact to light touch in proximal and distal aspects of all 4 extremities   Coordination: no pronator drift   Gait: deferred         Medical Decision Making       80 MINUTES SPENT BY ME on the date of service doing chart review, history, exam, documentation & further activities per the note.      Data     I have personally reviewed the following data over the past 24 hrs:    2.6 (L)  \   8.7 (L)   / 49 (LL)     138 105 11.4 /  190 (H)   4.1 23 1.02 \       Imaging results reviewed over the past 24 hrs:   Recent Results (from the past 24 hours)   CT Head w/o Contrast   Result Value    Radiologist flags Increased size and hemorrhage within the right (Urgent)    Narrative    EXAM: CT HEAD W/O CONTRAST  5/12/2025 11:02 AM     HISTORY: Decreased level of functioning/cognition over the last few  days. Hx recurrent neuroendocrine tumor, last dose of steroid 5/6.       COMPARISON: 4/23/2025.    TECHNIQUE: Using multidetector thin collimation helical acquisition  technique, axial, coronal and  sagittal CT images from the skull base  to the vertex were obtained without intravenous contrast.   (topogram) image(s) also obtained and reviewed. Dose reduction  techniques were used.    FINDINGS:  Increased size and hyperattenuation of the heterogeneous mass in the  right temporal lobe measuring 4.1 x 4.1 cm (2/13), previously 2.8 x  2.5 cm using similar measuring technique. Similar surrounding  vasogenic edema. Increased partial effacement of the right lateral  ventricle and leftward midline shift measuring 5 mm, previously 3 mm.  Postoperative changes of right frontotemporal parietal craniotomy with  slightly increased mixed density extra-axial collection underlying the  craniotomy site measuring up to 4 mm, previously 2 mm. Acute  hemorrhage within the left lateral ventricle, new from prior. Punctate  foci of residual pneumocephalus in the anterior right middle cranial  fossa. No CT findings of acute infarct or hydrocephalus. Vascular  calcifications.    No acute osseous abnormality. No substantial paranasal sinus mucosal  disease. Clear mastoid air cells. Bilateral pseudophakia.       Impression    IMPRESSION:   1. Increased size and attenuation of the right temporal lobe  hemorrhagic mass since the recent CT from 4/23/2025, concerning for  acute hemorrhage within the mass. New left intraventricular  hemorrhage.  2. Increased leftward midline shift of 5 mm, previously 3 mm, and  partial effacement of the right lateral ventricle.  3. Slightly increased size of the thin mixed density extra-axial  collection underlying the right craniotomy site.    [Urgent Result: Increased size and hemorrhage within the right  temporal lobe mass and leftward midline shift]    Finding was identified on 5/12/2025 11:22 AM.     Daniela Freitas CNP was contacted by Dr. Turner on 5/12/2025 at 11:40 AM  and verbalized understanding of the urgent finding.           I have personally reviewed the examination and initial  interpretation  and I agree with the findings.    BLAINE MERCADO MD         SYSTEM ID:  [id number]

## 2025-05-12 NOTE — PLAN OF CARE
Goal Outcome Evaluation:    Patient AxO, able to make needs known. VSS, denied pain. Somnolent during most of the shift, more awake around noontime. Able to use the bathroom with A2 walker and gait belt. Evaluated by providers, order for platelet transfusion given and done. Patient for transfer to [facility] today, still awaiting transport. Handoff given to receiving [facility] NOD. Endorsed accordingly to incoming NOD.

## 2025-05-12 NOTE — PLAN OF CARE
Acute Rehab Care Conference/Team Rounds      Type: Team Rounds    Present: Fortino Sun Jayde, APRN CNP,  Isabel Rice PT, Luzma Nayak OT, Chani Franco, OTR, Demetrius Robertson SLP, Laura Leroy RN CC, Elba Marti , Sylvia Fajardo RD, Laura Anderson PPS coordinator, Devonte Marrero RN, Eliazar ALEXANDRA Patient.      Discharge Barriers/Treatment/Education    Rehab Diagnosis: ***    Active Medical Co-morbidities/Prognosis: ***    Safety: Alarm on     Pain: Denies pain     Medications, Skin, Tubes/Lines: Takes pills whole, No tubes/lines     Swallowing/Nutrition: Reg diet thin liquids     Bowel/Bladder: Cont x 2 LBM 11     Psychosocial: Lives in a senior living apartment with son; Daughter lives nearby and helps with some IADLs. Pt also has more children out of state     ADLs/IADLs: Patient previously progressing ADLs/mobility. Generally requiring SBA/CGA ADLs and mobility with FWW, limited by activity precautions, impaired balance, and impaired cognition. Patient had a functional decline in past 2 days including increased fatigue, worsening balance, and requiring increased assistance for ADLs. Awaiting further assessment and recommendations from medical team. Pt has good support from son and daughter, will have initial 24/7 support. Family aware of fluctuations in performance and report they are able to provide recommended level of assistance. Recommend follow up HC PT/OT.     Mobility: Earlier on in rehab stay pt able to ambulate up to 200' w/ FWW or SEC, functionally declined over past few days and only able to ambulate 50' w/ FWW at best, needing sarastedy intermittently w/ nsg d/t fatigue. Caregiver training w/ son and dtr. Reinforced recommendations for SBA w/ all mobility initially at discharge. D/t ongoing fatigue recommending FWW, issued through [facility]. Family agreeable to provide level of assist pt needs, son to live w/ pt first week and dtr to live with him  "week 2. Then plan to coordinate w/ HH therapies for weaning assist.IND limited d/t overall fatigue and cognitive impairments. Discharge timeline ultimately dictated by medical status.     Cognition/Language:    Community Re-Entry: Recommending ongoing therapies to promote improved safety and IND w/ mobility     Transportation: Car transfer not a barrier. Family will provide.     Decision maker: {ACR DECISION MAKER:[id number]}    Plan of Care and goals reviewed and updated.    Discharge Plan/Recommendations    Fall Precautions: {ACR FALL PRECAUTIONS:7979287}    Patient/Family input to goals: ***    Anticipated rehab needs following discharge: ***    Anticipated care giver support after discharge: ***    Estimated length of stay: ***    Overall plan for the patient: ***      Utilization Review and Continued Stay Justification    Medical Necessity Criteria:    For any criteria that is not met, please document reason and plan for discharge, transfer, or modification of plan of care to address.    Requires intensive rehabilitation program to treat functional deficits?: {YES/NO :668236::\"Yes\"}    Requires 3x per week or greater involvement of rehabilitation physician to oversee rehabilitation program?: {YES/NO :875190::\"Yes\"}    Requires rehabilitation nursing interventions?: {YES/NO :805585::\"Yes\"}    Patient is making functional progress?: {YES/NO :009861::\"Yes\"}    There is a potential for additional functional progress? {YES/NO :465212::\"Yes\"}    Patient is participating in therapy 3 hours per day a minimum of 5 days per week or 15 hours per week in 7 day period?:{YES/NO :148375::\"Yes\"}    Has discharge needs that require coordinated discharge planning approach?:{YES/NO :179342::\"Yes\"}      Barriers/Concerns related to meeting medical necessity criteria:  ***    Team Plan to Address Concern:  ***      Final Physician Sign off    Statement of Approval: ***    Patient Goals    Social Work Goals: Confirm discharge " recommendations with therapy, coordinate safe discharge plan and remain available to support and assist as needed.     OT Predicted Duration/Target Date for Goal Attainment: 05/12/25  Therapy Frequency (OT): 6 times/week  OT: Hygiene/Grooming: independent  OT: Upper Body Dressing: Independent  OT: Lower Body Dressing: Supervision/stand-by assist  OT: Upper Body Bathing: Supervision/stand-by assist  OT: Lower Body Bathing: Supervision/stand-by assist  OT: Bed Mobility: Modified independent  OT: Transfer: Supervision/stand-by assist, Modified independent  OT: Toilet Transfer/Toileting: Supervision/stand-by assist, Modified independent  OT: Meal Preparation: Supervision/stand-by assist  OT: Home Management: Minimal assist  OT: Cognitive: Patient/caregiver will verbalize understanding of cognitive assessment results/recommendations as needed for safe discharge planning  OT: Goal 1: Pt will demo SBA shower transfer using DME/AE prn.       PT Predicted Duration/Target Date for Goal Attainment: 05/11/25  PT Frequency: 6x/week  PT: Bed Mobility: Independent  PT: Transfers: Supervision/stand-by assist  PT: Gait: Supervision/stand-by assist (Cane vs FWW)  PT: Goal 1: Car transfer w/ SBA FWW vs SPC  PT: Goal 2: Pt and family will indpendently verbalize 3 fall prevention strategies following education.                      {Speech Goals:744399}    {RN Goals:298405}Goal Outcome Evaluation:      Plan of Care Reviewed With: patient    Overall Patient Progress: improvingOverall Patient Progress: improving

## 2025-05-12 NOTE — PLAN OF CARE
Goal Outcome Evaluation:      Plan of Care Reviewed With: patient     Outcome Evaluation: Admitted to ICU from rehab facility. Neuro checks Q2. Pt A&Ox4, speech clear, no aphasia. Mild L facial droop, LUE mildly weaker than RUE with slight drift. [place], sensation intact in all extremities. Follows commands, calm and cooperative. Reports mild headache, crani site TYRELL and CDI. VSS on RA. Tele SR. Pt reports incontinence, external cath placed. Last BM 5/11 per rehab facility. Bed alarm on. Pt son updated via phone.         Problem: Adult Inpatient Plan of Care  Goal: Absence of Hospital-Acquired Illness or Injury  Intervention: Prevent Skin Injury  Recent Flowsheet Documentation  Taken 5/12/2025 1730 by Ju Alcocer RN  Body Position:   weight shifting   position changed independently     Problem: Adult Inpatient Plan of Care  Goal: Optimal Comfort and Wellbeing  Intervention: Monitor Pain and Promote Comfort  Recent Flowsheet Documentation  Taken 5/12/2025 1730 by Ju Alcocer RN  Pain Management Interventions: repositioned     Problem: Adult Inpatient Plan of Care  Goal: Absence of Hospital-Acquired Illness or Injury  Intervention: Prevent and Manage VTE (Venous Thromboembolism) Risk  Recent Flowsheet Documentation  Taken 5/12/2025 1730 by Ju Alcocer RN  VTE Prevention/Management: SCDs on (sequential compression devices)

## 2025-05-12 NOTE — PLAN OF CARE
Goal Outcome Evaluation:      Plan of Care Reviewed With: patient    Overall Patient Progress: no change    Overall Patient Progress: no change     Orientation: AOX4  Bowel: Continent LBM: 5/11  Bladder: Continent  Pain: denied  Ambulation/Transfers: SBA with FWW  Blood sugars: see results; Insulin given per sliding scale  Diet/Liquids: tolerating diet well; good appetite noted; took pills whole with thin liquids  Tubes/Lines/Drains: none  Skin: R crani incision TYRELL     Pt is more sleepy than usual. VSS. Monitored closely. Placed call light within reach. Bed alarm on for safety. Safety ensured at times. Continue POC

## 2025-05-12 NOTE — DISCHARGE SUMMARY
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   Diabetes mellitus, type 2, on long term insulin   Hyperlipidemia   Hypothyroidism   Osteopenia   Vitamin B12 deficiency   Dry mouth   Seasonal Allergies       BRIEF SUMMARY  Pascual Perea is a 84 year old right hand dominant male with past medical history of 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 CT head with right temporal lobe mass with associated hemorrhagic aspect, significant surrounding vasogenic edema, and right to left midline shift, later confirmed on MRI. Patient is now s/p right frontotemporal craniotomy for resection of right temporal brain tumor on 4/22/2025. Pathology revealed metastatic  recurrence of high grade neuroendocrine tumor. Post operative course complicated by cardiopulmonary arrest in PACU requiring re-intubation and CPR with ROSC along with seizure in PACU. Deficits include impaired activity tolerance, impaired balance, impaired judgement and safety awareness, impaired strength, impaired tone, and pain. Patient was admitted to ARU for strengthing and made good progress over his first week. He was Mod-I with a multi-point cane and CGA. He was scheduled to discharge on 5/12/2025 home with family and home health care.     Unfortunately, starting Friday afternoon and throughout the weekend, 5/9-5/12, therapies noted a functional decline. Dayday was requiring more assistance and having increased fatigue. No focal deficits noted. Morning of 5/12, Dayday was somnolent with arousal to loud verbal and shaking. He was able to answer questions, but required stimulation and encouragement to engage in conversation. CT head was obtained that showed concern for hemorrhage in known temporal neuroendocrine tumor. Dayday has had waxing and waning mental status, occasionally requiring loud verbal and arm shaking vs awake and eating lunch after cleared by SLP. He has been maintaining his own airway, managing secretions, and has been hemodynamically stable. In consult with NSGY, medicine and ICU team it was decided to have Dayday readmitted to [facility] Neuro ICU via ACLS transport. His family has been updated, code status was confirmed with family as: FULL CODE.        PT:     Physical Therapy Discharge Summary     Reason for therapy discharge:    Change in medical status.     Progress towards therapy goal(s). See goals on Care Plan in [facility] electronic health record for goal details.  Goals partially met.  Barriers to achieving goals:   discharge from facility.     Therapy recommendation(s):    Continued therapy is recommended.  Rationale/Recommendations:  Discharge back to hospital d/t change in medical  status. Pt has supportive family who are able to provide ~2 weeks supervision once medically stable prn .     Outcome Measures:   5TSTS:   5/6: 38.3 seconds  10MWT:   5/6: 0.44m/s comfortable pace, CGA with FWW        OT:     Occupational Therapy Discharge Summary     Reason for therapy discharge:    Discharged to acute hospital due to change in medical status.     Progress towards therapy goal(s). See goals on Care Plan in [facility] electronic health record for goal details.  Goals not met.  Barriers to achieving goals:   discharge from facility.     Therapy recommendation(s):    Continued therapy is recommended.  Rationale/Recommendations:  Recommend continued skilled OT intervention to address goals for discharge home with support when medically appropriate.       SLP:   Discharge Plan: Discharged to acute hospital due to change in medical status.     Precautions: Craniotomy, impulsivity     Current Status:  Hearing: Bilateral hearing aids.  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  Slow processing speed and limited insight into impairments  Swallow: Regular texture diet and thin liquids (0). Not formally evaluated by ARU SLP.     Assessment:  Pt seen with daughter and son present for family training. Pt and family edu in cognitive testing and activities with SLP, fatigue is barrier to participation and concern for daily task completion upon discharge from ARU. Pt and family caregivers edu in recommendation for oversight with med box set up, ensure doses taken correctly and consistently at same time, supervision with financial management, no driving until cleared by medical provider. Family endorsed comprehension of education and agreeable to recommendations.       Other Barriers to Discharge (Family Training, etc): oversight with med box set up, ensure doses taken correctly and consistently at same time,  "supervision with financial management, no driving until cleared by medical provider- completed with daughter Ananya and son Harpreet 05/10.      MEDICAL COURSE  Brain mass with hemorrhage and vasogenic edema s/p R frontotemporal craniotomy and tumor resection (4/22/2025)  Encephalopathy due to brain mass/brain edema  High grade neuroendocrine tumor, large cell type, left parotid gland s/p chemoradiation   R Epileptiform Discharges on EEG  Patient presented on 4/16/2025 with lethargy, somnolence, word-finding/memory difficulties, imbalance with walking, and generalized lower extremity weakness. CT head with 5.3 x 4 x 3.8 cm right temporal lobe mass with associated hemorrhagic aspect, significant surrounding vasogenic edema, and right to left midline shift 6.6 mm. MRI brain with 5.3 x 3.8 x 4.3 cm right temporal lobe mass with extensive surrounding vasogenic edema, hemorrhagic signal within the mass, and right to left midline shift 4 to 5 mm. Pt is now s/p right frontotemporal craniotomy for resection of right temporal brain tumor on 4/22/2025.  Patient with history of neuroendocrine tumor, which was thought to be in remission (follows with [facility] (Dr. Butler)). 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 Neurosurgery, oncology, and neurology followed patient while in acute care. Please see cancer treatment history outlined by Dr. Bravo in his note from 4/23.  Essentially, he notes that after patient received a course of definitive chemoradiation therapy for left parotid small cell carcinoma, a PET CT on 12/23/2024 showed complete metabolic response with no evidence of residual disease. Per Dr. Bravo on 4/30, \"Given his limited disease extent and improved clinical status, I discussed recommendations for post-operative stereotactic radiation therapy to the surgical cavity and residual disease to 3000 cGy in 5 fractions.   - Per oncology: Brain MRI and CT simulation in 2-3 " weeks pending his discharge from the hospital and rehab with initiation of post-operative stereotactic radiation therapy approximately 1 week later  - Neuro checks PRN  - Platelet goal > 75k.  Plts 75 on 5/5, -->68 on 5/6--> 60 5/8 (reached out to neurosurgery via [employer] 5/8/25 to confirm goals/ transfusion recommendations given 2+ weeks from surgery, did not receive callback at that time, NSGY states they did not receive message).  - 5/12 - Updated platelet goal continues to be transfuse for greater than or equal to >75k  - Currently transfusing 1 unit of platelets on transfer to [facility]   - Completed Decadron taper as of 5/6  - Wound cares: May leave incision open to air, cover with light gauze if patient is picking at incision  - Pain regimen:              - Tylenol 650mg Q4H PRN  - Keppra 1500mg BID per general neurology  - Goal SBP <150 - hydralazine PRN ordered, has not needed, SBPs 110's-130's with no medication  - Prior recommendation to resume Eliquis for afib POD#14 (5/6), however given ongoing thrombocytopenia, NSGY recommends continuing to hold.  - Craniotomy precautions  - Lorazepam 2mg IV PRN for seizure >2 minutes   - No seizure-like activity while at ARU  - Zofran 4mg Q6H PRN    Thrombocytopenia, chronic  - Likely chemotherapy induced, overall stable  - Per discharge summary from hospitalization 10/2024, cleared to resume Eliquis when plts >50K  Per NSGY recommendation, keep Plts >75K. Continue to hold eliquis per neurosurgery   - Plts 68 on 5/6, plan to routinely check qM/Th-->60 5/8 --> 49 5/12  - Conferred with [facility] NSGY - Started platelet transfusion while at [facility], blood consent updated and signed by patient     Moderate oral and pharyngeal dysphagia  Patient initially had NG tube in place secondary to dysphagia. He was followed by SLP in acute care. His diet was gradually advanced. On 4/28, patient was eating 100% of meals and tube feeds were tapered and discontinued. Feeding tube was  "removed on 4/29. On 5/1, patient advanced to regular diet with thin liquids. Prior to discharge from acute care, SLP signed off.   - Regular diet with thin liquids, cleared by SLP        Falls  Back pain, improving  RRT was called x2 on 4/28 for falls, not resulting in significant injury.  The first episode was unwitnessed. Patient reported that his left knee gave way and he \"sat down.\"  The second episode was assisted and patient was lowered back onto his bed.  Fall precautions were added. Patient briefly had bedside attendant, but this was discontinued on 4/29. On 4/30, patient reporting back pain. Neurosurgery ordered x-ray of lumbar spine which showed osteopenia, but otherwise unremarkable. Pain improving per patient.  - Fall precautions  - Continue PT/OT  - Up with assist only, including use of gait belt and walker/ cane  - On discharge, will plan to have 24-hour supervision for the first 2 weeks.  Then weaning to assistance with IADLs only  - Abdominal binder PRN for comfort     Coronary artery disease status post CABG  LVH  Mild troponin elevation  Postoperative hypotension, CODE BLUE activated  Troponin 42->45 on admission. Patient denied chest pain. EKG reviewed by cardiology initially due to ST elevations in setting of repolarization abnormality, but not felt to be ischemic. Likely secondary to brain mass/bleed. See postoperative note from Dr. Erickson, patient had \"hypotensive episode following extubation in the PACU to the point that he required activation of CODE BLUE.  Patient also required CPR\" with achievement of ROSC.  Has been hypotensive last 24 hours.    - Continue to Hold IMDUR- monitor bp (SBP )  - Plan to reevaluate with primary care on an outpatient basis     Atrial fibrillation with slow ventricular response  On cardiac monitoring in acute care, which showed evidence of ongoing, asymptomatic bradycardia.  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 Creatinine 1.22 on admission to acute care. -->Cr 0.88 5/8/25  - Avoid nephrotoxins  - BMP qM/Th     Diabetes mellitus, type 2, on long term insulin   HgbA1c 6.9% 2/10/25. Increased to 7.9% on 4/23/25 . Prior to admission on glargine 40 units BID and aspart 3 units per carb unit. Initially, patient was on insulin drip, which was discontinued on 4/28 with resumption of home regimine, however continued episodes of hypoglycemia carb coverage was discontinued in favor of lower dose lantus and med sliding scale insulin.   - Hypoglycemia protocol   - Glucose levels improved - continue to trend   - continue lantus 20 units   - Medium dose sliding scale insulin (using 0-2 units at a time)  - BGs at 0200 120-150's  - Plan to discharge from ARU with LOW sliding scale coverage and 20units Lantus at HS (ordered as discharging med)     Hyperlipidemia  - Continue PTA atorvastatin and fenofibrate      Hypothyroidism  - Continue PTA levothyroxine      Osteopenia  Evidenced on 4/30 L-spine XR.  - PCP to assess fracture risk and need for further work-up/treatment on discharge  - Total vitamin D: 16             - Will start cholecalciferol 1250 mcg every 7 days.     Vitamin B12 deficiency  - Continue cyanocobalamin 1000mcg Qday     Dry mouth  - Artifical saliva throat spray QHS (substituted for PTA ACT dry mouth lozenge)     Allergies  - Continue PTA azelastine 2 sprays BID and artifical tears 1 drop BID (substituted for PTA polyethylene glycol drops)          Adjustment to disability: Clinical psychology to eval and treat if indicated   FEN: Regular diet with thin liquids  Bladder:  Continent  Bowel: Miralax Qday, senna-docusate 1 tablet Qday, PRN bowel meds available  DVT Prophylaxis: Mechanical.  Holding re-initiation of Eliquis as above  GI Prophylaxis: N/A  Code: Full   Disposition: Goal for home  ELOS/Discharge Date:  Tentative discharge date 5/12  Rehab prognosis:  Good   Follow up Appointments on Discharge:   - Outpatient PCP  - Outpatient PM&R  - Home care therapies  - Oncology with brain MRI and CT simulation in 2-3 weeks pending his discharge from the hospital and rehab with initiation of post-operative stereotactic radiation therapy approximately 1 week later  - Neurosurgery  - Neurology in 6-8 weeks      DISCHARGE MEDICATIONS  Current Discharge Medication List        START taking these medications    Details   acetaminophen (TYLENOL) 325 MG tablet Take 2 tablets (650 mg) by mouth every 4 hours as needed for mild pain or fever.    Associated Diagnoses: Brain mass; S/P craniotomy      artificial saliva (BIOTENE MT) SOLN solution Take 1 spray by mouth at bedtime.    Associated Diagnoses: Brain mass      carboxymethylcellulose PF (REFRESH PLUS) 0.5 % ophthalmic solution Place 1 drop into both eyes 2 times daily.    Associated Diagnoses: Brain mass; Altered mental status, unspecified altered mental status type      cholecalciferol (VITAMIN D3) 1250 mcg (54389 units) capsule Take 1 capsule (1,250 mcg) by mouth every 7 days.  Qty: 4 capsule, Refills: 0    Associated Diagnoses: Vitamin D deficiency; Other dietary vitamin B12 deficiency anemia      fenofibrate (LOFIBRA) 54 MG tablet Take 1 tablet (54 mg) by mouth daily (with breakfast).  Qty: 30 tablet, Refills: 0    Associated Diagnoses: Hyperlipidemia LDL goal <70; Coronary artery disease involving native coronary artery of native heart without angina pectoris      insulin aspart (NOVOLOG PEN) 100 UNIT/ML pen Inject 1-3 Units subcutaneously 3 times daily (before meals). Correction Scale - LOW INSULIN RESISTANCE DOSING   Do Not give  Correction Insulin if Pre-Meal BG less than 140. For Pre-Meal  - 239 give 1 unit. For Pre-Meal  - 339 give 2 units. For Pre-Meal BG greater than or equal to 340 give 3 units.  If patient is NOT eating a meal: Blood glucose should still be checked and correction (sliding scale) insulin to be administered within 30 minutes if order parameters are met.  Qty: 3 mL, Refills: 0    Associated Diagnoses: Type 2 diabetes mellitus with stage 3a chronic kidney disease, with long-term current use of insulin (H)      polyethylene glycol (MIRALAX) 17 GM/Dose powder Take 17 g by mouth daily.    Associated Diagnoses: Brain mass; Gastroesophageal reflux disease without esophagitis; Gastroparesis           CONTINUE these medications which have CHANGED    Details   insulin glargine (LANTUS PEN) 100 UNIT/ML pen Inject 20 Units subcutaneously every morning (before breakfast).  Qty: 15 mL, Refills: 0    Comments: If Lantus is not covered by insurance, may substitute Basaglar or Semglee or other insulin glargine product per insurance preference at same dose and frequency.    Associated Diagnoses: Type 2 diabetes mellitus with stage 3a chronic kidney disease, with long-term current use of insulin (H)      levETIRAcetam (KEPPRA) 750 MG tablet Take 2 tablets (1,500 mg) by mouth 2 times daily.  Qty: 120 tablet, Refills: 0    Associated Diagnoses: Brain mass           CONTINUE these medications which have NOT CHANGED    Details   atorvastatin (LIPITOR) 40 MG tablet TAKE 1 TABLET (40 MG) BY MOUTH DAILY  Qty: 90 tablet, Refills: 2    Associated Diagnoses: Hyperlipidemia LDL goal <70      blood glucose (ACCU-CHEK NORMAN PLUS) test strip USE TO TEST BLOOD SUGAR 3 TIMES A DAY OR AS DIRECTED  Qty: 100 strip, Refills: 2    Associated Diagnoses: Type 2 diabetes mellitus without complication, with long-term current use of insulin (H)      cyanocobalamin (VITAMIN B-12) 1000 MCG tablet Take 1 tablet (1,000 mcg) by mouth daily.       levothyroxine (SYNTHROID/LEVOTHROID) 125 MCG tablet Take 1 tablet (125 mcg) by mouth daily  Qty: 90 tablet, Refills: 3    Comments: Stop refills of Levothyroxine 112mcg tabs  Associated Diagnoses: Hypothyroidism, unspecified type      senna-docusate (SENOKOT-S/PERICOLACE) 8.6-50 MG tablet Take 1 tablet by mouth or Feeding Tube 2 times daily as needed for constipation.  Qty: 28 tablet, Refills: 1    Associated Diagnoses: Brain mass; S/P craniotomy      Artificial Saliva (ACT DRY MOUTH) LOZG Take 1 lozenge by mouth at bedtime.      azelastine 137 MCG/SPRAY SOLN [place] 2 sprays into both nostrils 2 times daily.  Qty: 30 mL, Refills: 1    Associated Diagnoses: Chronic rhinitis      blood glucose calibration (ACCU-CHEK NORMAN) solution USE AS DIRECTED  Qty: 1 each, Refills: 0    Comments: Medication is being filled for 1 time refill only due to:  Due for office visit  Associated Diagnoses: Type 2 diabetes mellitus without complication, with long-term current use of insulin (H)      Continuous Glucose  (FREESTYLE DELFINA 2 READER) CHRYSTAL 1 Device continuously.  Qty: 1 each, Refills: 1    Associated Diagnoses: Type 2 diabetes mellitus without complication, with long-term current use of insulin (H)      Continuous Glucose Sensor (FREESTYLE DELFINA 2 SENSOR) MISC APPLY 1 SENSOR AND CHANGE EVERY 14 DAYS AS DIRECTED  Qty: 6 each, Refills: 3    Associated Diagnoses: Type 2 diabetes mellitus without complication, with long-term current use of insulin (H)      insulin pen needle (GLOBAL EASE INJECT PEN NEEDLES) 31G X 5 MM miscellaneous Use one pen needle 5 times a day  Qty: 500 each, Refills: 3    Associated Diagnoses: Type 2 diabetes mellitus without complication, with long-term current use of insulin (H)      polyethylene glycol 400 (BLINK TEARS) 0.25 % SOLN ophthalmic solution Place 1 drop into both eyes daily.           STOP taking these medications       chlorhexidine (HIBICLENS) 4 % solution Comments:   Reason for  Stopping:         dexAMETHasone (DECADRON) 1 MG tablet Comments:   Reason for Stopping:         fenofibrate micronized (LOFIBRA) 67 MG capsule Comments:   Reason for Stopping:         isosorbide mononitrate (IMDUR) 30 MG 24 hr tablet Comments:   Reason for Stopping:         oxyCODONE (ROXICODONE) 5 MG tablet Comments:   Reason for Stopping:                 DISCHARGE INSTRUCTIONS AND FOLLOW UP  Discharge Procedure Orders   Home Care Referral   Referral Priority: Routine: Next available opening Referral Type: Home Health Therapies & Aides   Number of Visits Requested: 1     Home Care Referral   Referral Priority: Routine: Next available opening Referral Type: Home Health Therapies & Aides   Number of Visits Requested: 1     Reason for your hospital stay   Order Comments: You were seen in the hospital for removal of a brain mass and bleeding. When you were medically stable, you came to the ARU for strengthening. You have now completed your physical rehab stay and are ready to transition home with Home Care and family support.     Activity   Order Comments: Your activity upon discharge: Have someone with you at all times when you are getting up and moving when you first get home. As you get more comfortable in your home, you can work with your family to transition to frequent check-ins and help as you need it.   Please limit your lifting to no more that ten pounds and avoid excessive jostling and jarring activities.     Order Specific Question Answer Comments   Is discharge order? Yes      ADULT [facility] Specialty Follow-up and recommended labs and tests   Order Comments: Follow up as listed later in this packet.    Appointments on [place] and/or [facility] (with [facility] or [facility] provider or service). Call [phone] if you haven't heard regarding these appointments within 7 days of discharge.     Monitor and record   Order Comments: Blood glucose: 4 times a day, before meals and at bedtime.  Blood pressure:  daily.    Bring these logs with you to your follow up appointments     Wound care and dressings   Order Comments: Instructions to care for your wound at home: ice to area for comfort, keep wound clean and dry but you may get the incision wet in the shower. Do not soak or scrub the incision.       Please avoid lotions / creams and leave your incision open to air.  .     Follow Up and recommended labs and tests   Order Comments: Follow up with hospital provider. Additional outpatient follow up listed later in this packet.     Reason for your hospital stay   Order Comments: You were at [facility] for strengthening. However, you had functional decline and a head CT showed concern for bleeding in you brain. You were transferred back to the hospital for medical management     Daily weights   Order Comments: Call Provider for weight gain of more than 2 pounds per day or 5 pounds per week.     Intake and output   Order Comments: Every shift     Wound care (specify)   Order Comments: Site:   Right head   Open to air. No dressing needed.     Activity - Up with nursing assistance     Order Specific Question Answer Comments   Is discharge order? Yes      Full Code     Order Specific Question Answer Comments   Code status determined by: Discussion with patient/ legal decision maker      Physical Therapy Adult Consult   Order Comments: Evaluate and treat as clinically indicated.     Occupational Therapy Adult Consult   Order Comments: Evaluate and treat as clinically indicated.     Speech Language Path Adult Consult   Order Comments: Evaluate and treat as clinically indicated.     Fall precautions     Seizure precautions     Diet   Order Comments: Follow this diet upon discharge: Regular diet and thin liquids. Diets rich in vegetables and whole grains is recommended.     Order Specific Question Answer Comments   Is discharge order? Yes      Diet   Order Comments: Follow this diet upon discharge: Current Diet:      Room Service       Combination Diet Regular Diet; Thin Liquids (level 0)      Diet     Order Specific Question Answer Comments   Is discharge order? Yes      Hospital Follow-up with Existing Primary Care Provider (PCP)   Standing Status: Future Standing Exp. Date: 06/08/25   Order Comments:       Order Specific Question Answer Comments   Schedule Primary Care visit within 7 Days           PHYSICAL EXAMINATION    Most recent Vital Signs:   Vitals:    05/11/25 0657 05/11/25 1556 05/12/25 0749 05/12/25 1333   BP: 124/54 124/59 128/45 128/51   BP Location: Left arm Left arm Left arm    Patient Position: Supine      Cuff Size: Adult Regular      Pulse: 56 83 67 64   Resp: 16 18 18 18   Temp: 98.7  F (37.1  C) 98  F (36.7  C) 97.8  F (36.6  C) 98.3  F (36.8  C)   TempSrc: Oral Oral Oral Oral   SpO2: 95% 96% 96% 99%   Weight:       Height:             Gen: NAD, pleasant, seated upright in chair  HEENT: right crani incision, approximated no drainage. Slight flattening of nasolabial fold on right side. No other focal deficits noted. Pupils PEERLA  Cardio: + Murmur, irregular  Pulm: non-labored on room air, lungs CTA bilaterally  Abd: soft, non-tender, non-distended, bowel sounds present  Extr: no edema or calf tenderness in bilateral lower extremities  Neuro/MSK: Able to move all 4 extremities on command. Strength slightly diminished on right side as compared to left on exam.        >50 minutes spent in discharge, including >50% in counseling and coordination of care, medication review and plan of care recommended on follow up.     Discharge summary was forwarded to Andrew Elizalde (PCP) at the time of discharge, so as to bridge from hospital to outpatient care.     It was our pleasure to care for Pascual Perea during this hospitalization. Please do not hesitate to contact me should there be questions regarding the hospital course or discharge plan.      JAKI Gonsalves CNP  Physical Medicine & Rehabilitation

## 2025-05-13 ENCOUNTER — APPOINTMENT (OUTPATIENT)
Dept: SPEECH THERAPY | Facility: CLINIC | Age: 85
DRG: 064 | End: 2025-05-13
Payer: COMMERCIAL

## 2025-05-13 ENCOUNTER — APPOINTMENT (OUTPATIENT)
Dept: OCCUPATIONAL THERAPY | Facility: CLINIC | Age: 85
DRG: 064 | End: 2025-05-13
Payer: COMMERCIAL

## 2025-05-13 ENCOUNTER — APPOINTMENT (OUTPATIENT)
Dept: PHYSICAL THERAPY | Facility: CLINIC | Age: 85
DRG: 064 | End: 2025-05-13
Payer: COMMERCIAL

## 2025-05-13 ENCOUNTER — APPOINTMENT (OUTPATIENT)
Dept: MRI IMAGING | Facility: CLINIC | Age: 85
DRG: 064 | End: 2025-05-13
Attending: PHYSICIAN ASSISTANT
Payer: COMMERCIAL

## 2025-05-13 LAB
ANION GAP SERPL CALCULATED.3IONS-SCNC: 9 MMOL/L (ref 7–15)
BUN SERPL-MCNC: 8.8 MG/DL (ref 8–23)
CALCIUM SERPL-MCNC: 9.1 MG/DL (ref 8.8–10.4)
CHLORIDE SERPL-SCNC: 102 MMOL/L (ref 98–107)
CREAT SERPL-MCNC: 0.96 MG/DL (ref 0.67–1.17)
EGFRCR SERPLBLD CKD-EPI 2021: 78 ML/MIN/1.73M2
ERYTHROCYTE [DISTWIDTH] IN BLOOD BY AUTOMATED COUNT: 15.4 % (ref 10–15)
ERYTHROCYTE [SEDIMENTATION RATE] IN BLOOD BY WESTERGREN METHOD: 1 MM/HR (ref 0–20)
GLUCOSE BLDC GLUCOMTR-MCNC: 117 MG/DL (ref 70–99)
GLUCOSE BLDC GLUCOMTR-MCNC: 119 MG/DL (ref 70–99)
GLUCOSE BLDC GLUCOMTR-MCNC: 135 MG/DL (ref 70–99)
GLUCOSE BLDC GLUCOMTR-MCNC: 175 MG/DL (ref 70–99)
GLUCOSE BLDC GLUCOMTR-MCNC: 193 MG/DL (ref 70–99)
GLUCOSE BLDC GLUCOMTR-MCNC: 221 MG/DL (ref 70–99)
GLUCOSE SERPL-MCNC: 125 MG/DL (ref 70–99)
HCO3 SERPL-SCNC: 25 MMOL/L (ref 22–29)
HCT VFR BLD AUTO: 24.7 % (ref 40–53)
HGB BLD-MCNC: 8.1 G/DL (ref 13.3–17.7)
MCH RBC QN AUTO: 29.9 PG (ref 26.5–33)
MCHC RBC AUTO-ENTMCNC: 32.8 G/DL (ref 31.5–36.5)
MCV RBC AUTO: 91 FL (ref 78–100)
MCV RBC AUTO: 92 FL (ref 78–100)
PLATELET # BLD AUTO: 86 10E3/UL (ref 150–450)
PLATELET # BLD AUTO: 89 10E3/UL (ref 150–450)
POTASSIUM SERPL-SCNC: 4 MMOL/L (ref 3.4–5.3)
RBC # BLD AUTO: 2.71 10E6/UL (ref 4.4–5.9)
SODIUM SERPL-SCNC: 136 MMOL/L (ref 135–145)
WBC # BLD AUTO: 2.2 10E3/UL (ref 4–11)

## 2025-05-13 PROCEDURE — 85049 AUTOMATED PLATELET COUNT: CPT | Performed by: PHYSICIAN ASSISTANT

## 2025-05-13 PROCEDURE — 250N000013 HC RX MED GY IP 250 OP 250 PS 637

## 2025-05-13 PROCEDURE — 85049 AUTOMATED PLATELET COUNT: CPT

## 2025-05-13 PROCEDURE — 92526 ORAL FUNCTION THERAPY: CPT | Mod: GN | Performed by: SPEECH-LANGUAGE PATHOLOGIST

## 2025-05-13 PROCEDURE — 83010 ASSAY OF HAPTOGLOBIN QUANT: CPT | Performed by: INTERNAL MEDICINE

## 2025-05-13 PROCEDURE — 99233 SBSQ HOSP IP/OBS HIGH 50: CPT | Performed by: INTERNAL MEDICINE

## 2025-05-13 PROCEDURE — 82607 VITAMIN B-12: CPT | Performed by: INTERNAL MEDICINE

## 2025-05-13 PROCEDURE — 255N000002 HC RX 255 OP 636: Performed by: PHYSICIAN ASSISTANT

## 2025-05-13 PROCEDURE — 250N000013 HC RX MED GY IP 250 OP 250 PS 637: Performed by: INTERNAL MEDICINE

## 2025-05-13 PROCEDURE — 120N000001 HC R&B MED SURG/OB

## 2025-05-13 PROCEDURE — 99223 1ST HOSP IP/OBS HIGH 75: CPT | Performed by: INTERNAL MEDICINE

## 2025-05-13 PROCEDURE — 82728 ASSAY OF FERRITIN: CPT | Performed by: INTERNAL MEDICINE

## 2025-05-13 PROCEDURE — 97116 GAIT TRAINING THERAPY: CPT | Mod: GP | Performed by: PHYSICAL THERAPIST

## 2025-05-13 PROCEDURE — 99207 PR APP CREDIT; MD BILLING SHARED VISIT: CPT | Performed by: PHYSICIAN ASSISTANT

## 2025-05-13 PROCEDURE — 36415 COLL VENOUS BLD VENIPUNCTURE: CPT | Performed by: PHYSICIAN ASSISTANT

## 2025-05-13 PROCEDURE — 250N000011 HC RX IP 250 OP 636: Mod: JZ

## 2025-05-13 PROCEDURE — 36415 COLL VENOUS BLD VENIPUNCTURE: CPT

## 2025-05-13 PROCEDURE — 70553 MRI BRAIN STEM W/O & W/DYE: CPT

## 2025-05-13 PROCEDURE — 85018 HEMOGLOBIN: CPT

## 2025-05-13 PROCEDURE — 97161 PT EVAL LOW COMPLEX 20 MIN: CPT | Mod: GP | Performed by: PHYSICAL THERAPIST

## 2025-05-13 PROCEDURE — 97530 THERAPEUTIC ACTIVITIES: CPT | Mod: GP | Performed by: PHYSICAL THERAPIST

## 2025-05-13 PROCEDURE — 97166 OT EVAL MOD COMPLEX 45 MIN: CPT | Mod: GO | Performed by: OCCUPATIONAL THERAPIST

## 2025-05-13 PROCEDURE — 82310 ASSAY OF CALCIUM: CPT

## 2025-05-13 PROCEDURE — 250N000012 HC RX MED GY IP 250 OP 636 PS 637

## 2025-05-13 PROCEDURE — 99291 CRITICAL CARE FIRST HOUR: CPT | Mod: 24 | Performed by: PHYSICIAN ASSISTANT

## 2025-05-13 PROCEDURE — 92610 EVALUATE SWALLOWING FUNCTION: CPT | Mod: GN | Performed by: SPEECH-LANGUAGE PATHOLOGIST

## 2025-05-13 PROCEDURE — 258N000003 HC RX IP 258 OP 636

## 2025-05-13 PROCEDURE — A9585 GADOBUTROL INJECTION: HCPCS | Performed by: PHYSICIAN ASSISTANT

## 2025-05-13 PROCEDURE — 97530 THERAPEUTIC ACTIVITIES: CPT | Mod: GO | Performed by: OCCUPATIONAL THERAPIST

## 2025-05-13 PROCEDURE — 85652 RBC SED RATE AUTOMATED: CPT | Performed by: INTERNAL MEDICINE

## 2025-05-13 RX ORDER — GADOBUTROL 604.72 MG/ML
8 INJECTION INTRAVENOUS ONCE
Status: COMPLETED | OUTPATIENT
Start: 2025-05-13 | End: 2025-05-13

## 2025-05-13 RX ORDER — LIDOCAINE 40 MG/G
CREAM TOPICAL
Status: DISCONTINUED | OUTPATIENT
Start: 2025-05-13 | End: 2025-05-25 | Stop reason: HOSPADM

## 2025-05-13 RX ADMIN — GADOBUTROL 8 ML: 604.72 INJECTION INTRAVENOUS at 13:50

## 2025-05-13 RX ADMIN — LEVETIRACETAM 1500 MG: 100 INJECTION, SOLUTION INTRAVENOUS at 08:51

## 2025-05-13 RX ADMIN — FENOFIBRATE 54 MG: 54 TABLET ORAL at 08:28

## 2025-05-13 RX ADMIN — LEVETIRACETAM 1500 MG: 100 INJECTION, SOLUTION INTRAVENOUS at 20:22

## 2025-05-13 RX ADMIN — ACETAMINOPHEN 650 MG: 325 TABLET, FILM COATED ORAL at 22:04

## 2025-05-13 RX ADMIN — INSULIN GLARGINE 15 UNITS: 100 INJECTION, SOLUTION SUBCUTANEOUS at 08:29

## 2025-05-13 RX ADMIN — CARBOXYMETHYLCELLULOSE SODIUM 1 DROP: 5 SOLUTION/ DROPS OPHTHALMIC at 08:28

## 2025-05-13 RX ADMIN — ATORVASTATIN CALCIUM 40 MG: 40 TABLET, FILM COATED ORAL at 08:28

## 2025-05-13 RX ADMIN — ACETAMINOPHEN 650 MG: 325 TABLET, FILM COATED ORAL at 11:48

## 2025-05-13 RX ADMIN — ACETAMINOPHEN 650 MG: 325 TABLET, FILM COATED ORAL at 06:34

## 2025-05-13 RX ADMIN — CARBOXYMETHYLCELLULOSE SODIUM 1 DROP: 5 SOLUTION/ DROPS OPHTHALMIC at 21:24

## 2025-05-13 RX ADMIN — AZELASTINE HYDROCHLORIDE 2 SPRAY: 137 SPRAY, METERED NASAL at 08:51

## 2025-05-13 RX ADMIN — ACETAMINOPHEN 650 MG: 325 TABLET, FILM COATED ORAL at 15:49

## 2025-05-13 RX ADMIN — AZELASTINE HYDROCHLORIDE 2 SPRAY: 137 SPRAY, METERED NASAL at 21:24

## 2025-05-13 RX ADMIN — Medication 1 SPRAY: at 21:24

## 2025-05-13 RX ADMIN — LEVOTHYROXINE SODIUM 125 MCG: 125 TABLET ORAL at 08:28

## 2025-05-13 ASSESSMENT — ACTIVITIES OF DAILY LIVING (ADL)
ADLS_ACUITY_SCORE: 63
ADLS_ACUITY_SCORE: 59
ADLS_ACUITY_SCORE: 59
ADLS_ACUITY_SCORE: 63
PREVIOUS_RESPONSIBILITIES: MEAL PREP;HOUSEKEEPING;LAUNDRY;SHOPPING;MEDICATION MANAGEMENT;FINANCES;DRIVING
ADLS_ACUITY_SCORE: 59
ADLS_ACUITY_SCORE: 63
ADLS_ACUITY_SCORE: 61
ADLS_ACUITY_SCORE: 63
ADLS_ACUITY_SCORE: 59
ADLS_ACUITY_SCORE: 63
ADLS_ACUITY_SCORE: 63
ADLS_ACUITY_SCORE: 59
ADLS_ACUITY_SCORE: 63
ADLS_ACUITY_SCORE: 59
ADLS_ACUITY_SCORE: 63
ADLS_ACUITY_SCORE: 59
ADLS_ACUITY_SCORE: 59
ADLS_ACUITY_SCORE: 63
ADLS_ACUITY_SCORE: 59

## 2025-05-13 NOTE — CONSULTS
[facility]    Stroke Consult Note    Reason for Consult:  IPH    Chief Complaint: No chief complaint on file.       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     He had initially presented 4/16/25 with generalized weakness/fatigue. Found to have a large R temporal mass with associated hemorrhage and vasogenic edema. He was discharged 4/18 to spend time with family and returned 4/22 for planned R frontotemporal craniotomy with neoplasm excision. Post-op course complicated by cardiopulmonary arrest and seizures. He was discharged to ARU 5/4/25 (He had not resumed Eliquis yet). He returned 5/12 due to increasing somnolence at ARU since  5/9. Found to have new R IPH. Platelets 49K. Neurosurgery consulted and not recommending surgery or steroids. Oncology/Radiation oncology consulted.    Temp: afebrile  SBP range: 100-132  Heart rate: WNL  Wbc: 2.2  Sodium: 136  Platelets 49>86  Blood glucose: 117  Not intubated or sedated      Stroke Evaluation Summarized    MRI/Head CT MRI brain tumor protocol: 3.7 x 4.7 hemorrhagic enhancing lesion R temporal lobe, surrounding vasogenic edema involving most of R temporal lobe and R temporo-occipital region, appear more heterogeneous and less solidly enhancing compared to prior MRI 4/23/25, persistent mass effect with partial effacement R lateral ventricle and third ventricle, no significant change in 5.5 mm Leftward midline shift at lateral ventricles, stable mild medialization of R uncus, 6.7 mm FLAIR hyperintense extra-axial fluid deep to R sided craniotomy (prior 3.4 mm), mild dural thickening and enhancement deep to R sided craniotomy    CTH #2: stable  CTH:#1. Increased size R temporal  hemorrhagic mass concerning for acute hemorrhage within mass, new LVH, increased Lward midline shift 5 mm, slight increased thin mized density extra-axial collection underlying the R craniotomy     EKG/Telemetry Wide QRS rhythm   Left ventricular hypertrophy with QRS widening and repolarization abnormality   Cannot rule out Inferior infarct , age undetermined      LDL  No lab value available in past 30 days   A1C  4/23/2025: 7.9 %   Troponin No lab value available in past 48 hrs       Impression/Recommendations   # R temporal hemorrhagic mass s/p R frontotemporal craniotomy with neoplasm partial excision  4/22/25, now with worsening bleeding in setting of coagulopathy with pancytopenia (plt 49 K on presentation)  -Neuro checks and vitals every 2 hours, platelets stable at noon today, okay from NCC perspective as well to transfer out of ICU  -repeat CTH tomorrow AM (ordered)  -appreciate neurosurgery recommendations  -appreciate heme/onc recommendations  -Labs: monitor coags, appreciate intensivist and oncology management  -Hold all antiplatelets/anticoagulants/NSAIDs, and pharmacologic VTE prophylaxis  -Blood glucose monitoring, euglycemia, BG <180 while inpatient  -MRI brain w/wo contrast tumor protocol  -PT/OT as tolerated  -NPO until cleared by SPT with Dysphagia screen  -Elevate head of bed 30 degrees  -Systolic blood pressure target 130-150, avoid lowering >60 mmHg, if drops lower than 130 spontaneously that is okay  -Seizure precautions  -continue Keppra 1.5 g every 12 hours, oncology hoping to decrease this dose, would hold off on weaning with acute bleed, recommend waiting a couple weeks  -ECG/troponins x 3, telemetry   -Euthermia, eunatremia   -Stroke Education    -Discussed with vascular neurology attending, Dr. Flowers        Patient Follow-up    - final recommendation pending work-up    Thank you for this consult. We will continue to follow.     Fatou Brantley PA-C  Vascular Neurology    To page me or  "covering stroke neurology team member, click here: AMCOM  Choose \"On Call\" tab at top, then select \"NEUROLOGY/ALL SITES\" from middle drop-down box, press Enter, then look for \"stroke\" or \"telestroke\" for your site.  _____________________________________________________    Clinically Significant Risk Factors Present on Admission             # Hypomagnesemia: Lowest Mg = 1.6 mg/dL in last 2 days, will replace as needed     # Thrombocytopenia: Lowest platelets = 49 in last 2 days, will monitor for bleeding   # Hypertension: Noted on problem list      # Anemia: based on hgb <11      # DMII: A1C = 7.9 % (Ref range: <5.7 %) within past 6 months       # Financial/Environmental Concerns: none            Past Medical History    Past Medical History:   Diagnosis Date    Adhesive capsulitis of shoulder     Allergic rhinitis 01/07/2008    Problem list name updated by automated process. Provider to review      Anemia 03/10/2014    Anemia, unspecified     Antiplatelet or antithrombotic long-term use     Arrhythmia     Atrial fib/flutter    Atrial fibrillation, unspecified type (H) 05/02/2018    CAD (coronary artery disease)     Chemotherapy-induced neutropenia 07/19/2024    Coronary artery disease involving native coronary artery of native heart without angina pectoris 02/16/2025    Coronary atherosclerosis     Nuc Stress 4/15/16: On both stress and rest images there is a very small size mild intensity apical photopenic defect which is of equal extent and intensity on both stress and rest images. Gated images demonstrated no regional wall motion abnormalities. The left ventricular ejection fraction was calculated to be 61% with stress.      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    History of cardioversion 04/24/2018    a single synchronized shock of 120 " joules restored normal sinus rhythm    History of cardioversion 02/13/2019    single shock , 200 joules successul in restoring NSR    History of colonic polyps 03/24/2017    Hyperlipidemia LDL goal <70 05/26/2011    Hypertension     Hypothyroidism     Hypothyroidism, unspecified type 08/15/2016    Impotence of organic origin     Laceration of finger 06/2010     left thumb s/p sutures    Numbness and tingling     diabetic neuropathy bilateral feet    BRANDON (obstructive sleep apnea)     intolerant of CPAP, uses it occasionally.  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 disease, with long-term current use of insulin (H) 02/16/2025    Type 2 diabetes mellitus without complication  (goal A1C<8) 10/24/2015    Typical atrial flutter (H) 04/18/2016    Unspecified hypothyroidism     Vitamin D deficiency 09/03/2011     Medications   Home Meds  Prior to Admission medications    Medication Sig Start Date End Date Taking? Authorizing Provider   acetaminophen (TYLENOL) 325 MG tablet Take 2 tablets (650 mg) by mouth every 4 hours as needed for mild pain or fever. 5/9/25  Yes Daniela Freitas APRN CNP   artificial saliva (BIOTENE MT) SOLN solution Take 1 spray by mouth at bedtime. 5/9/25  Yes Daniela Freitas APRN CNP   atorvastatin (LIPITOR) 40 MG tablet TAKE 1 TABLET (40 MG) BY MOUTH DAILY 3/4/25  Yes Andrew Elizalde MD   azelastine 137 MCG/SPRAY SOLN [place] 2 sprays into both nostrils 2 times daily. 9/25/24  Yes Andrew Elizalde MD   carboxymethylcellulose PF (REFRESH PLUS) 0.5 % ophthalmic solution Place 1 drop into both eyes 2 times daily.  5/9/25  Yes Daniela Freitas APRN CNP   cholecalciferol (VITAMIN D3) 1250 mcg (35573 units) capsule Take 1 capsule (1,250 mcg) by mouth every 7 days. 5/9/25  Yes Daniela Freitas APRN CNP   cyanocobalamin (VITAMIN B-12) 1000 MCG tablet Take 1 tablet (1,000 mcg) by mouth daily. 1/2/25  Yes Florencia Butler MD   fenofibrate (LOFIBRA) 54 MG tablet Take 1 tablet (54 mg) by mouth daily (with breakfast). 5/10/25  Yes Daniela Freitas APRN CNP   insulin aspart (NOVOLOG PEN) 100 UNIT/ML pen Inject 1-3 Units subcutaneously 3 times daily (before meals). Correction Scale - LOW INSULIN RESISTANCE DOSING   Do Not give Correction Insulin if Pre-Meal BG less than 140. For Pre-Meal  - 239 give 1 unit. For Pre-Meal  - 339 give 2 units. For Pre-Meal BG greater than or equal to 340 give 3 units.  If patient is NOT eating a meal: Blood glucose should still be checked and correction (sliding scale) insulin to be administered within 30 minutes if order parameters are met. 5/9/25  Yes Daniela Freitas APRN CNP   insulin glargine (LANTUS PEN) 100 UNIT/ML pen Inject 20 Units subcutaneously every morning (before breakfast). 5/10/25  Yes Daniela Freitas APRN CNP   levETIRAcetam (KEPPRA) 750 MG tablet Take 2 tablets (1,500 mg) by mouth 2 times daily. 5/9/25  Yes Daniela Freitas APRN CNP   levothyroxine (SYNTHROID/LEVOTHROID) 125 MCG tablet Take 1 tablet (125 mcg) by mouth daily 4/24/24  Yes Andrew Elizalde MD   polyethylene glycol (MIRALAX) 17 GM/Dose powder Take 17 g by mouth daily. 5/9/25  Yes Daniela Freitas APRN CNP   senna-docusate (SENOKOT-S/PERICOLACE) 8.6-50 MG tablet Take 1 tablet by mouth or Feeding Tube 2 times daily as needed for constipation. 5/3/25  Yes Andrés Sky PA-C   Artificial Saliva (ACT DRY MOUTH) LOZG Take 1 lozenge by mouth at bedtime.  Patient not taking: Reported on 5/12/2025    Unknown, Entered By History   blood glucose (ACCU-CHEK NORMAN PLUS) test strip USE TO TEST BLOOD SUGAR 3 TIMES A DAY OR AS  DIRECTED 9/4/24   Andrew Elizalde MD   blood glucose calibration (ACCU-CHEK NORMAN) solution USE AS DIRECTED 10/6/20   Andrew Elizalde MD   Continuous Glucose  (FREESTYLE DELFINA 2 READER) CHRYSTAL 1 Device continuously. 4/16/25   Andrew Elizalde MD   Continuous Glucose Sensor (FREESTYLE DELFINA 2 SENSOR) [facility] APPLY 1 SENSOR AND CHANGE EVERY 14 DAYS AS DIRECTED 2/27/25   Andrew Elizalde MD   insulin pen needle (GLOBAL EASE INJECT PEN NEEDLES) 31G X 5 MM miscellaneous Use one pen needle 5 times a day 3/26/25   Andrew Elizalde MD   polyethylene glycol 400 (BLINK TEARS) 0.25 % SOLN ophthalmic solution Place 1 drop into both eyes daily.  Patient not taking: Reported on 5/12/2025    Unknown, Entered By History       Scheduled Meds  Current Facility-Administered Medications   Medication Dose Route Frequency Provider Last Rate Last Admin    artificial saliva (BIOTENE MT) solution 1 spray  1 spray Mouth/Throat At Bedtime Armand Leal MD   1 spray at 05/12/25 2213    atorvastatin (LIPITOR) tablet 40 mg  40 mg Oral Daily Armand Leal MD   40 mg at 05/13/25 0828    azelastine (ASTELIN) nasal spray 2 spray  2 spray Both Nostrils BID Armand Leal MD   2 spray at 05/13/25 0851    carboxymethylcellulose PF (REFRESH PLUS) 0.5 % ophthalmic solution 1 drop  1 drop Both Eyes BID Armand Leal MD   1 drop at 05/13/25 0828    fenofibrate (LOFIBRA) tablet 54 mg  54 mg Oral Daily with breakfast Armand Leal MD   54 mg at 05/13/25 0828    insulin aspart (NovoLOG) injection (RAPID ACTING)  1-7 Units Subcutaneous TID AC Armand Leal MD   2 Units at 05/13/25 1138    insulin aspart (NovoLOG) injection (RAPID ACTING)  1-5 Units Subcutaneous At Bedtime Armand Leal MD        insulin glargine (LANTUS PEN) injection 15 Units  15 Units Subcutaneous QAM AC Armand Leal MD   15 Units at 05/13/25 0829    levETIRAcetam (KEPPRA) 1,500 mg in sodium chloride 0.9 % 125 mL intermittent infusion  1,500 mg Intravenous BID Armand Leal  mL/hr at  "05/13/25 0851 1,500 mg at 05/13/25 0851    levothyroxine (SYNTHROID/LEVOTHROID) tablet 125 mcg  125 mcg Oral Daily Armand Leal MD   125 mcg at 05/13/25 0828       Infusion Meds  Current Facility-Administered Medications   Medication Dose Route Frequency Provider Last Rate Last Admin       Allergies   Allergies   Allergen Reactions    Omeprazole      diarrhea    Pantoprazole      diarrhea          PHYSICAL EXAMINATION   Temp:  [97.9  F (36.6  C)-99.6  F (37.6  C)] 98  F (36.7  C)  Pulse:  [49-75] 56  Resp:  [12-18] 15  BP: (100-151)/(43-69) 105/66  SpO2:  [93 %-100 %] 98 %    General Exam  General:  patient lying in bed without any acute distress    HEENT:  normocephalic/atraumatic  Pulmonary:  no respiratory distress on RA    Neuro Exam  Mental Status:  alert, oriented  to self, month, year, not the date, and his age can spell WORLD backwards and counts backwards from 100 by 7s as \"100, 93, 86, 89, 82\", follows all commands, speech mild-moderately dysarthric but fluent, naming and repetition intact  Cranial Nerves:  visual fields intact, PERRL, EOMI with normal smooth pursuit, facial sensation intact and symmetric, hearing not formally tested but intact to conversation, tongue protrusion midline, R facial droop  Motor:  drift to LUE not hitting bed, strength 4/5, RUE 5/5 no drift, LLE 4/5 no drift, RLE 5/5 no drift  Reflexes:  toes down-going  Sensory:  light touch sensation intact and symmetric throughout upper and lower extremities, no extinction on double simultaneous stimulation   Coordination:  normal finger-to-nose and heel-to-shin bilaterally without dysmetria  Station/Gait:  deferred        ICH Score (at arrival)  Scoring Tool Score   Age >= 80 years 1 point Yes   GCS score  3-4   5-12   13-15   2 point  1 point  0 point GCS 13-15   Hematoma volume, cm 3 >= 30 1 point Yes   Intraventricular extension 1 point Yes   Infratentorial location 1 point No   Total 3          Stroke Scales    NIHSS  1a. Level of " Consciousness 0-->Alert, keenly responsive   1b. LOC Questions 0-->Answers both questions correctly   1c. LOC Commands 0-->Performs both tasks correctly   2.   Best Gaze 0-->Normal   3.   Visual 0-->No visual loss   4.   Facial Palsy 2-->Partial paralysis (total or near-total paralysis of lower face)   5a. Motor Arm, Left 1-->Drift, limb holds 90 (or 45) degrees, but drifts down before full 10 seconds, does not hit bed or other support   5b. Motor Arm, Right 0-->No drift, limb holds 90 (or 45) degrees for full 10 secs   6a. Motor Leg, Left 0-->No drift, leg holds 30 degree position for full 5 secs   6b. Motor Leg, right 0-->No drift, leg holds 30 degree position for full 5 secs   7.   Limb Ataxia 0-->Absent   8.   Sensory 0-->Normal, no sensory loss   9.   Best Language 0-->No aphasia, normal   10. Dysarthria 1-->Mild-to-moderate dysarthria, patient slurs at least some words and, at worst, can be understood with some difficulty   11. Extinction and Inattention  0-->No abnormality   Total 4 (05/13/25 1250)       Imaging  I personally reviewed all imaging; relevant findings per HPI.    Labs Data   CBC  Recent Labs   Lab 05/13/25  1158 05/13/25  0321 05/12/25  2107 05/12/25  0716   WBC  --  2.2* 2.7* 2.6*   RBC  --  2.71* 2.94* 2.84*   HGB  --  8.1* 8.7* 8.7*   HCT  --  24.7* 27.3* 25.7*   PLT 89* 86* 76* 49*     Basic Metabolic Panel   Recent Labs   Lab 05/13/25  1137 05/13/25  0826 05/13/25  0415 05/13/25  0321 05/12/25  0730 05/12/25  0716 05/08/25  1150 05/08/25  0815   NA  --   --   --  136  --  138  --  139   POTASSIUM  --   --   --  4.0  --  4.1  --  3.9   CHLORIDE  --   --   --  102  --  105  --  105   CO2  --   --   --  25  --  23  --  23   BUN  --   --   --  8.8  --  11.4  --  16.2   CR  --   --   --  0.96  --  1.02  --  0.88   * 135* 117* 125*   < > 177*   < > 179*   STARR  --   --   --  9.1  --  9.1  --  9.0    < > = values in this interval not displayed.     Liver Panel  No results for input(s):  "\"PROTTOTAL\", \"ALBUMIN\", \"BILITOTAL\", \"ALKPHOS\", \"AST\", \"ALT\", \"BILIDIRECT\" in the last 168 hours.  INR    Recent Labs   Lab Test 05/12/25  2107 04/22/25  1447 04/22/25  1349   INR 1.14 1.21* 1.30*           Stroke Consult Data Data   This was a non-emergent, non-telestroke consult.  I personally examined and evaluated the patient today. At the time of my evaluation and management the patient was in critical condition today due to IPH . I personally managed review of chart, medical record, meds, imaging, history, exam, and discussion with attending regarding plan and documentation.I spent a total of 70 minutes providing critical care services, evaluating the patient, directing care and reviewing laboratory values and radiologic reports.       *All or a portion of this note was generated using voice recognition software and may contain transcription errors.  "

## 2025-05-13 NOTE — CONSULTS
[facility] Hematology / Oncology  Progress Note  Name: Pascual Perea  :  1940  MRN:  [id number]    --------------------    Assessment / Plan:  High-grade neuroendocrine tumor with parotid gland, cervical lymph node and CNS involvement.  Pancytopenia, likely multifactorial with past treatment and ongoing antiepileptics.    Agree with plans for radiation to achieve what is suspected to be tumor control likely with secondary bleeding.  Do not have a high suspicion that recent degree of thrombocytopenia markedly contributing to bleeding.  With that being said, suspect pancytopenia likely multifactorial with past treatments and ongoing Keppra contributing.  Would recommend seeing if we can reduce Keppra dosing to reduce bone marrow suppression.  Platelet transfusion parameter per neurosurgery team.  Requested a number of labs to further explore the causes of pancytopenia.  Hold anticoagulation for afib; will need for start w/ prophy AC dosing at some point to rechallenge.  Past imaging from a systemic staging standpoint in April was reassuring; may need PET as an outpatient in the future.  CNS penetrating systemic therapies are limited in high-grade neuroendocrine tumors beyond immunotherapy.    Pablo Knowles MD.    --------------------    Interval History:  Pascual presents for consultation pancytopenia in the setting of metastatic high-grade neuroendocrine carcinoma.  He initially was identified as having a neck mass of the left parotid gland in 2025 with multiply enlarged cervical lymph nodes.  Ultrasound-guided biopsy revealed metastatic high-grade neuroendocrine carcinoma with PET staging confirming hypermetabolic left parotid mass and multiply hypermetabolic left cervical lymph nodes.  He received 4 cycles of platinum and etoposide with radiation added for cycles 2 and 3.  He received treatment with chemotherapy between August and 2024.  Radiation was provided September to  October 2024 cumulative receiving 6000 cGy over 30 fractions.  His last staging CTs revealed no signs of systemic disease.  Unfortunately, experienced neurologic symptoms and was found to have CNS recurrent disease, biopsy-proven and resected.  Most recent head CT revealed stable postsurgical changes with high density blood products associated with a mass lesion in the right temporal lobe unchanged from the prior scan but with associated vasogenic edema.  MRI is pending.    --------------------    Review of Systems:  10 point ROS negative except for that above.    Past Medical / Surgical History:  Past Medical History:   Diagnosis Date    Adhesive capsulitis of shoulder     Allergic rhinitis 01/07/2008    Problem list name updated by automated process. Provider to review      Anemia 03/10/2014    Anemia, unspecified     Antiplatelet or antithrombotic long-term use     Arrhythmia     Atrial fib/flutter    Atrial fibrillation, unspecified type (H) 05/02/2018    CAD (coronary artery disease)     Chemotherapy-induced neutropenia 07/19/2024    Coronary artery disease involving native coronary artery of native heart without angina pectoris 02/16/2025    Coronary atherosclerosis     Nuc Stress 4/15/16: On both stress and rest images there is a very small size mild intensity apical photopenic defect which is of equal extent and intensity on both stress and rest images. Gated images demonstrated no regional wall motion abnormalities. The left ventricular ejection fraction was calculated to be 61% with stress.      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    History of cardioversion 04/24/2018    a single synchronized shock of 120 joules restored normal sinus rhythm    History of cardioversion 02/13/2019    single shock , 200 joules successul in restoring  NSR    History of colonic polyps 03/24/2017    Hyperlipidemia LDL goal <70 05/26/2011    Hypertension     Hypothyroidism     Hypothyroidism, unspecified type 08/15/2016    Impotence of organic origin     Laceration of finger 06/2010     left thumb s/p sutures    Numbness and tingling     diabetic neuropathy bilateral feet    BRANDON (obstructive sleep apnea)     intolerant of CPAP, uses it occasionally.  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 disease, with long-term current use of insulin (H) 02/16/2025    Type 2 diabetes mellitus without complication  (goal A1C<8) 10/24/2015    Typical atrial flutter (H) 04/18/2016    Unspecified hypothyroidism     Vitamin D deficiency 09/03/2011     Past Surgical History:   Procedure Laterality Date    ANESTHESIA CARDIOVERSION N/A 02/13/2019    Procedure: ANESTHESIA CARDIOVERSION;  Surgeon: GENERIC ANESTHESIA PROVIDER;  Location:  OR    ANESTHESIA CARDIOVERSION N/A 05/22/2019    Procedure: ANESTHESIA, FOR CARDIOVERSION;  Surgeon: GENERIC ANESTHESIA PROVIDER;  Location:  OR    CARDIAC SURGERY      bypass in 1997    CARDIOVERSION  04/24/2018    COLONOSCOPY      CORONARY ANGIOGRAPHY ADULT ORDER      4/2/2012    CORONARY ARTERY BYPASS  1997    [facility]    ENDOSCOPIC ULTRASOUND UPPER GASTROINTESTINAL TRACT (GI) N/A 03/14/2019    Procedure: ENDOSCOPIC ULTRASOUND OF UPPER GASTROINTESTINAL TRACT;  Surgeon: Ju Torres MD;  Location:  OR    ESOPHAGOSCOPY, GASTROSCOPY, DUODENOSCOPY (EGD), COMBINED N/A 4/15/2022    Procedure: ESOPHAGOGASTRODUODENOSCOPY  (EGD);  Surgeon: Erik Baca, DO;  Location:  GI    EXCISE MASS HEAD Left 08/18/2016    Procedure: EXCISE MASS HEAD;  Surgeon: Ivan Hernandez MD;  Location: [facility]    HEART CATH, ANGIOPLASTY  04/2012    IR CHEST PORT PLACEMENT > 5 YRS OF AGE  8/9/2024    IR PORT REMOVAL RIGHT  2/17/2025    LAPAROSCOPIC CHOLECYSTECTOMY N/A 03/17/2019    Procedure: LAPAROSCOPIC CHOLECYSTECTOMY;  Surgeon: Janel Paz MD;  Location:  OR    OPTICAL TRACKING SYSTEM CRANIOTOMY, EXCISE TUMOR, COMBINED Right 4/22/2025    Procedure: RIGHT FRONTO-TEMPORAL CRANIOTOMY, USING OPTICAL TRACKING SYSTEM, WITH NEOPLASM EXCISION;  Surgeon: Davie Erickson MD;  Location:  OR    PHACOEMULSIFICATION CLEAR CORNEA WITH STANDARD INTRAOCULAR LENS IMPLANT Left 06/08/2015    Procedure: PHACOEMULSIFICATION CLEAR CORNEA WITH STANDARD INTRAOCULAR LENS IMPLANT;  Surgeon: Nixon Farnsworth MD;  Location:  EC    PHACOEMULSIFICATION CLEAR CORNEA WITH STANDARD INTRAOCULAR LENS IMPLANT Right 06/22/2015    Procedure: PHACOEMULSIFICATION CLEAR CORNEA WITH STANDARD INTRAOCULAR LENS IMPLANT;  Surgeon: Nixon Farnsworth MD;  Location:  EC    SEPTOPLASTY, TURBINOPLASTY, COMBINED Bilateral 08/18/2016    Procedure: COMBINED SEPTOPLASTY, TURBINOPLASTY;  Surgeon: Ivan Hernandez MD;  Location: [facility] NONSPECIFIC PROCEDURE  1997    CABG ([facility])    [facility] NONSPECIFIC PROCEDURE  08/2001    stress echo     Patient Active Problem List   Diagnosis    Essential hypertension, benign    Coronary atherosclerosis    Impotence of organic origin    Allergic rhinitis    Anemia    Testosterone deficiency    Hyperlipidemia LDL goal <70    Osteopenia    Vitamin D deficiency    BRANDON (obstructive sleep apnea)    Gastroesophageal reflux disease without esophagitis    Hypothyroidism, unspecified type    Atrial fibrillation, unspecified type (H)    Gastroparesis    Steatosis of liver    High grade neuroendocrine carcinoma (H)    Malignant poorly  differentiated neuroendocrine carcinoma (H)    Other dietary vitamin B12 deficiency anemia    History of colonic polyps    Type 2 diabetes mellitus with stage 3a chronic kidney disease, with long-term current use of insulin (H)    Stage 3a chronic kidney disease (H)    Thrombocytopenia    Coronary artery disease involving native coronary artery of native heart without angina pectoris    Vasogenic brain edema (H)    Brain mass    Altered mental status, unspecified altered mental status type    S/P craniotomy    Intracranial hemorrhage (H)       Family History:  Family History   Problem Relation Age of Onset    Genitourinary Problems Father         d: age 89; ARF    Hypertension Father     Diabetes Mother         d: age 68, CAD    Heart Disease Mother         MI    Myocardial Infarction Mother     Cardiovascular Brother         d:  age 31; CAD    Heart Disease Brother         age 31, MI    Diabetes Sister         b:  1939; DM2    Diabetes Sister        Social History:  Social History     Tobacco Use    Smoking status: Former     Current packs/day: 0.00     Average packs/day: 1 pack/day for 6.5 years (6.5 ttl pk-yrs)     Types: Cigarettes, Cigars, Pipe     Start date:      Quit date: 1964     Years since quittin.9    Smokeless tobacco: Never    Tobacco comments:     10/16/24 Pt states he will an occasional cigar.    Vaping Use    Vaping status: Never Used   Substance Use Topics    Alcohol use: Not Currently     Comment: couple drinks a year    Drug use: No       Medications / Allergies:  Reviewed in EMR.    --------------------    Physical Exam:  VS: /48 (BP Location: Left arm)   Pulse 51   Temp 98  F (36.7  C) (Oral)   Resp 15   SpO2 96%   GEN: Well appearing.    Labs / Imaging / Path:  Reviewed CBC, CMP, surgical pathology reports.  Reviewed CT head and MR brain w/ independent review.

## 2025-05-13 NOTE — PROGRESS NOTES
"Radiation Oncology Brief Note  Patient not seen or examined. This is a brief note to help coordinate care.    The patient had been scheduled next week for brain MRI and CT simulation for post-operative radiation therapy. Given his re-admission for neurologic symptoms and CT imaging showing recent bleed, I am concerned about progression and recommend moving up the patient's timeline for treatment. Neurosurgery is not planning on any urgent surgical management and endorses that the incision is healing well.     I placed a call to the patient's daughter and have yet to hear back, but tentatively we will plan on CT simulation tomorrow, May 14, 2025, and daily five fraction treatment May 19-23, 2025. He either would need to remain admitted during this time or be discharged home. Due to transportation and insurance coverage, he cannot be staying in an ARU or TCU while receiving radiotherapy.     I kindly request a brain MRI with and without contrast be completed in the next 30 hours for radiation treatment planning. Please include in the comments the following:   \"Per Tumor Protocol. Thin slice MR Brain w/wo for stereotactic radiation treatment planning purposes. Please compare to post-op MRI Brain performed on 4/23/25.\"    I have spoken with the hospitalist, Dr. Winkler, and neurosurgery PA, Mali Baker, regarding these recommendations. Please reach out with questions.    Claudio Bravo MD  [facility]  "

## 2025-05-13 NOTE — PLAN OF CARE
Goal Outcome Evaluation:      Plan of Care Reviewed With: patient          Outcome Evaluation: discharge to home with family and homecare rn/pt/ot/slp/hha pending plan of care and therapy reassessment

## 2025-05-13 NOTE — PROGRESS NOTES
"  SLP Bedside Swallow Evaluation  25 8410   Appointment Info   Signing Clinician's Name / Credentials (SLP) Tessie Serrato MA [facility] SLP   General Information   Onset of Illness/Injury or Date of Surgery 25   Referring Physician    Patient/Family Therapy Goal Statement (SLP) Agreed to VFSS; goal to eat this am   Pertinent History of Current Problem Per notes: \"Mr. Perea is a pleasant 84 male known to [facility] as status post right fronto-temporal craniotomy with neoplasm excision with Dr. Erickson on 2025. Per report he was doing well until Friday afternoon when it was reported he had a gradual decline in functioning at ARU requiring additional assistance. Per referring CNP, he was previously requiring modified independent assistance of a cane which progressed to needing walker assistance. Exam worsened to include generaized malaise, lethargy and possible left facial droop and generalized weakness. Platelet this AM 49. Did not resume Eliquis yet.   Currently complains of a mild headache.\"    Imagin. Increased size and attenuation of the right temporal lobe  hemorrhagic mass since the recent CT from 2025, concerning for  acute hemorrhage within the mass. New left intraventricular  hemorrhage.  2. Increased leftward midline shift of 5 mm, previously 3 mm, and  partial effacement of the right lateral ventricle.  3. Slightly increased size of the thin mixed density extra-axial  collection underlying the right craniotomy site.   General Observations Pt was alert and cooperative.  Decreased awareness noted of wet voicing and oral residue at times.  Pt reported a vague hx of dysphagia symptoms (including globus senesation with crumbly dry food and occasional need to cough/clear his throat at baseline).   Type of Evaluation   Type of Evaluation Swallow Evaluation   Oral Motor   Oral Musculature   (L labial asymmetry/droop)   Dentition (Oral Motor)   Dentition (Oral Motor) adequate dentition "   Cough/Swallow/Gag Reflex (Oral Motor)   Comment, Cough/Swallow/Gag Reflex (Oral Motor) Mild decreased strength   Vocal Quality/Secretion Management (Oral Motor)   Comment, Vocal Quality/Secretion Management (Oral Motor) WFL   General Swallowing Observations   Current Diet/Method of Nutritional Intake (General Swallowing Observations, NIS) regular diet;thin liquids (level 0)   Respiratory Support room air   Past History of Dysphagia See notes above.   Clinical Swallow Evaluation   Feeding Assistance no assistance needed   Clinical Swallow Eval: Thin Liquid Texture Trial   Mode of Presentation, Thin Liquids cup;straw   Volume of Liquid or Food Presented sips by cup x 2, sips by straw x 2   Oral Phase of Swallow WFL   Pharyngeal Phase of Swallow repeated swallows   Diagnostic Statement wet voicing/throat clear throughout; delayed cough x 1   Clinical Swallow Evaluation: Solid Food Texture Trial   Mode of Presentation self-fed   Volume Presented bites x 5+   Oral Phase residue in oral cavity  (L > R)   Pharyngeal Phase repeated swallows   Diagnostic Statement wet voicing/throat clear throughout   Swallowing Recommendations   Diet Consistency Recommendations regular diet;thin liquids (level 0)   Medication Administration Recommendations, Swallowing (SLP) Meds with small sips of thin or use puree; crush as needed   Instrumental Assessment Recommendations VFSS (videofluoroscopic swallowing study)   Comment, Swallowing Recommendations sit at 90 degrees, stay up for 30 minutes, small single sips, alternate textures, check for oral residue, pick soft food, hold po if decreased respiratory status; hold po if decreased respiratory status   Clinical Impression   Criteria for Skilled Therapeutic Interventions Met (SLP Eval) Yes, treatment indicated   SLP Diagnosis Mild-moderate oral-pharyngeal dysphagia   Risks & Benefits of therapy have been explained evaluation/treatment results reviewed;care plan/treatment goals  reviewed;risks/benefits reviewed;current/potential barriers reviewed;participants voiced agreement with care plan;participants included;patient   Clinical Impression Comments Pt present with mild-moderate oral-pharyngeal dysphagia at bedside.  Deficits/risk factors include hx of dysphagia symptoms (including globus senesation with crumbly dry food and occasional need to cough/clear his throat at baseline), hx of chemradiation for L parotid CA, recent admit for R craniotomy with moderate dysphagia reported initially, L labial droop/asymmetry at rest, oral residue L > R, wet voicing and throat clearing throughout po trials, and delayed coughing x 1 after thin liquids.  Difficult to determine if deficits are baseline or increased at this time.  SLP reported pt did produce throat clearing with po during recent admit.   Recommend increased use of precautions/strategies with a continued regular diet and thin liquids; hold po if decreased respiratory status.  Plan to proceed with a VFSS 5/14 to fully assess pharyngeal phase deficits and aspiration risk.   SLP Total Evaluation Time   Eval: oral/pharyngeal swallow function, clinical swallow Minutes (82382) 15   Swallowing Intervention   Treatment of Swallowing Dysfunction &/or Oral Function for Feeding Minutes (97346) 10   Symptoms Noted During/After Treatment None   Treatment Detail/Skilled Intervention Educated pt and RN re: findings, recommended strategies/precautions, VFSS rationale.  Both verbalized understanding.  Pt took an additional sip of thin by straw in Tx with min cues for small sip.  Mod sip taken. Slight throat clear after trial, ? related or from previous po.   SLP Discharge Planning   SLP Plan VFSS   SLP Discharge Recommendation home   SLP Rationale for DC Rec Swallow function may be below baseline; however, baseline dysphagia symptoms also reported.   SLP Brief overview of current status  Mild-moderatate oral-pharyngeal dysphagia; Rec: careful use of  precautions/strategies with pt's current regular diet and thin liquids.  Remind pt for the following: sit at 90 degrees, stay up for 30 minutes, small single sips, alternate textures, check for oral residue, pick soft food, hold po if decreased respiratory status   SLP Time and Intention   Total Session Time (sum of timed and untimed services) 25

## 2025-05-13 NOTE — PLAN OF CARE
ICU End of Shift Nursing Summary *For vital signs and complete assessments, please see documentation flowsheets      Neuro: RASS 0/-1, PERRL, opens eyes spontaneuously/to voice, follows commands, MUJICA. Neuros unchanged. Slight L facial droop at rest. L side slightly weaker than right.  Pain: C/o headache 3/10, tylenol given.  CV:  tele NSR w/ Mobitz AVB, SBP goal <150 .   Pulm:LS clear, RA  GI/: Passed bedside swallow screen. External cath leaked x1. Bladder scanned later for 400 and voided via commode 325.   Endocrine: BS checks stable  Skin: see flowsheet  Lines: PIV x1  Drips:   Additional: CT from 1900 says unchanged bleed. AM labs stable    Plan (Upcoming Events): further nsurg recs    Bedside Shift Report Completed : yes  Bedside Safety Check Completed: yes     Problem: Adult Inpatient Plan of Care  Goal: Plan of Care Review  Description: The Plan of Care Review/Shift note should be completed every shift.  The Outcome Evaluation is a brief statement about your assessment that the patient is improving, declining, or no change.  This information will be displayed automatically on your shiftnote.  Flowsheets (Taken 5/13/2025 0313)  Plan of Care Reviewed With: patient  Overall Patient Progress: improving  Goal: Absence of Hospital-Acquired Illness or Injury  Intervention: Identify and Manage Fall Risk  Recent Flowsheet Documentation  Taken 5/13/2025 0200 by Lissette Lewis RN  Safety Promotion/Fall Prevention:   activity supervised   assistive device/personal items within reach   clutter free environment maintained   increase visualization of patient   nonskid shoes/slippers when out of bed  Taken 5/13/2025 0000 by Lissette Lewis RN  Safety Promotion/Fall Prevention:   activity supervised   assistive device/personal items within reach   clutter free environment maintained   increase visualization of patient   nonskid shoes/slippers when out of bed  Taken 5/12/2025 2200 by Lissette Lewis RN  Safety  Promotion/Fall Prevention:   activity supervised   assistive device/personal items within reach   clutter free environment maintained   increase visualization of patient   nonskid shoes/slippers when out of bed  Taken 5/12/2025 2000 by Lissette Lewis RN  Safety Promotion/Fall Prevention:   activity supervised   assistive device/personal items within reach   clutter free environment maintained   increase visualization of patient   nonskid shoes/slippers when out of bed  Intervention: Prevent Skin Injury  Recent Flowsheet Documentation  Taken 5/13/2025 0000 by Lissette Lewis RN  Body Position: position changed independently  Taken 5/12/2025 2200 by Lissette Lewis RN  Body Position: position changed independently  Taken 5/12/2025 2000 by Lissette Lewis RN  Body Position:   weight shifting   position changed independently  Intervention: Prevent and Manage VTE (Venous Thromboembolism) Risk  Recent Flowsheet Documentation  Taken 5/13/2025 0000 by Lissette Lewis RN  VTE Prevention/Management: SCDs on (sequential compression devices)  Taken 5/12/2025 2000 by Lissette Lewis RN  VTE Prevention/Management: SCDs on (sequential compression devices)  Goal: Optimal Comfort and Wellbeing  Intervention: Monitor Pain and Promote Comfort  Recent Flowsheet Documentation  Taken 5/13/2025 0000 by Lissette Lewis RN  Pain Management Interventions: (refused tylenol) repositioned  Intervention: Provide Person-Centered Care  Recent Flowsheet Documentation  Taken 5/13/2025 0000 by Lissette Lewis RN  Trust Relationship/Rapport:   care explained   choices provided   questions answered  Taken 5/12/2025 2000 by Lissette Lewis RN  Trust Relationship/Rapport:   care explained   choices provided   questions answered     Problem: Risk for Delirium  Goal: Optimal Coping  Intervention: Optimize Psychosocial Adjustment to Delirium  Recent Flowsheet Documentation  Taken 5/13/2025 0000 by Lissette Lewis  RN  Supportive Measures: active listening utilized  Taken 5/12/2025 2000 by Lissette Lewis RN  Supportive Measures: active listening utilized  Goal: Improved Behavioral Control  Intervention: Prevent and Manage Agitation  Recent Flowsheet Documentation  Taken 5/13/2025 0000 by Lissette Lewis RN  Environment Familiarity/Consistency: daily routine followed  Taken 5/12/2025 2000 by Lissette Lewis RN  Environment Familiarity/Consistency: daily routine followed  Intervention: Minimize Safety Risk  Recent Flowsheet Documentation  Taken 5/13/2025 0000 by Lissette Lewis RN  Enhanced Safety Measures:   assistive devices when indicated   room near unit station  Trust Relationship/Rapport:   care explained   choices provided   questions answered  Taken 5/12/2025 2000 by Lissette Lewis RN  Enhanced Safety Measures:   assistive devices when indicated   room near unit station  Trust Relationship/Rapport:   care explained   choices provided   questions answered  Goal: Improved Attention and Thought Clarity  Intervention: Maximize Cognitive Function  Recent Flowsheet Documentation  Taken 5/13/2025 0000 by Lissette Lewis RN  Reorientation Measures: clock in view  Taken 5/12/2025 2000 by Lissette Lewis RN  Reorientation Measures: clock in view   Goal Outcome Evaluation:      Plan of Care Reviewed With: patient    Overall Patient Progress: improvingOverall Patient Progress: improving

## 2025-05-13 NOTE — PROGRESS NOTES
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 Admitted to ICU with hospital medicine as primary.         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 Discharged to rehab 5/4, then with gradual decline starting 5/9, increased weakness and somnolence with CT head with increased R temporal lobe hemorrahgic mass and new left intraventricular hemorrhage, transferred to [facility] ICU.  *Repeat CT stable, discussed with neurosurgery no surgical intervention planned, steroids not needed at this time.   - Admit to ICU  - Neurosurgery consulted  - q 2 neuro checks  - Transfuse platelets to goal greater than 75K, consented  - SBP goal <150, PRNs ordered  - Continue Keppra 1500 mg twice daily  - Steroids not indicated per neurosurgery  - HOB 30 degrees   - Seizure precautions  - Okay for diet   - PT/OT/SLP    Repeat CT scan states stable  bleed.  Neurosurgery team following.  Okay to transfer to neuro [facility] with every 2 hours checks.  Transfer orders are done  Neurocritical care consultation requested and are following     High grade neuroendocrine tumor, large cell type, left parotid gland s/p chemoradiation   Hx of neuroendocrine tumor as above, follows with [facility] (Dr. Butler), previously felt to be in remission.  Evaluated by radiation oncology during previous admission, given limited disease extent had recommended postoperative stereotactic radiation therapy to surgical cavity in 5 fractions, they had planned to repeat imaging 2 to 3 weeks after discharge and initiate treatment about 1 week later.  - Heme/onc consulted  - Radiation oncology recommended doing a MRI brain without and with contrast to start the radiation therapy during this hospitalization  Order entered for the request     Acute on chronic Thrombocytopenia  Previous admission platelts , chronic previously attributed to chemotherapy. Transfusion goal >75 with active intracranial bleed. Received platelets x2 5/12- recheck level of 76 obtained prior to 2nd transfusion being complete.  - Transfuse PRN to >75, consented  - Monitor  - heme/onc consulted      Atrial fibrillation with slow ventricular response  Previous admission had ongoing asymptomatic bradycardia without evidence of higher degree block, remained in A-fib. Intermittently bradycardic to 50s without mental status changes or hypertension here, less likely Cushing's reflex. EKG appears consistent with afib with slow ventricular response.   - Monitor closely  - Hold apixaban      Moderate oral and pharyngeal dysphagia  Noted on previous admission, evaluated by SLP and nutrition, diet has been advanced to regular with thin liquids   - SLP consult given acute change      Coronary artery disease status post CABG  LVH  Postoperative hypotension, CODE BLUE activated  Of note, had hypotensive episode following  extubation in the PACU to the point that he required code blue and CPR.       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 Creatinine 1.02 on admission.  - Monitor      Diabetes mellitus, type 2, on long term insulin   HgbA1c 7.9% 4/2025. Discharged with glargine 20 units BID with sliding scale, rehab decreased to 20 units daily. Did have issues with hypoglycemia previous admission.  - While NPO q4hr checks with medium dose sliding scale insulin  - continue glargine at reduced 15u, monitor   - hypoglycemia protocol      Hyperlipidemia  - Continue PTA atorvastatin, fenofibrate      Hypothyroidism  - Continue PTA levothyroxine              Diet: Moderate Consistent Carb (60 g CHO per Meal) Diet  Room Service    DVT Prophylaxis: Pneumatic Compression Devices  Duran Catheter: Not present  Lines: None     Cardiac Monitoring: ACTIVE order. Indication: Tachyarrhythmias, acute (48 hours)  Code Status: Full Code      Clinically Significant Risk Factors Present on Admission             # Hypomagnesemia: Lowest Mg = 1.6 mg/dL in last 2 days, will replace as needed     # Thrombocytopenia: Lowest platelets = 49 in last 2 days, will monitor for bleeding   # Hypertension: Noted on problem list      # Anemia: based on hgb <11      # DMII: A1C = 7.9 % (Ref range: <5.7 %) within past 6 months       # Financial/Environmental Concerns: none         Social Drivers of Health    Tobacco Use: Medium Risk (2/17/2025)    Patient History     Smoking Tobacco Use: Former     Smokeless Tobacco Use: Never   Physical Activity: Insufficiently Active (2/9/2025)    Exercise Vital Sign     Days of Exercise per Week: 7 days     Minutes of Exercise per Session: 20 min   Social Connections: Unknown (2/9/2025)    Social Connection and Isolation Panel [NHANES]     Frequency of Social Gatherings with Friends and Family: Three times a week          Disposition Plan     Medically Ready for Discharge: Anticipated in 2-4 Days       Mental  status is stable.  Radiation oncology plans are currently pending inpatient versus outpatient radiation therapy  PT OT evaluations pending      Arleen Winkler MD  Hospitalist Service  [facility]  Securely message with [employer] (more info)  Text page via [employer] Paging/Directory   ______________________________________________________________________    Interval History     Patient is resting comfortably in bed.  Knows that he is in the hospital.  But was not able to tell me what month or what year.  Repeat CT scan of the head was stable.  No other acute issues    Physical Exam   Vital Signs: Temp: 98  F (36.7  C) Temp src: Oral BP: 103/48 Pulse: 51   Resp: 15 SpO2: 96 % O2 Device: None (Room air)    Weight: 0 lbs 0 oz    General Appearance: Alert awake, resting in bed, not in acute distress  Respiratory: Clear to auscultation bilaterally  Cardiovascular: Normal rate rhythm regular  GI: Soft, nontender, nondistended, bowel sounds positive  Skin: Pallor present  Other: No lower extremity edema    Medical Decision Making       52 MINUTES SPENT BY ME on the date of service doing chart review, history, exam, documentation & further activities per the note.      Data     I have personally reviewed the following data over the past 24 hrs:    2.2 (L)  \   8.1 (L)   / 89 (L)     136 102 8.8 /  221 (H)   4.0 25 0.96 \     INR:  1.14 PTT:  N/A   D-dimer:  N/A Fibrinogen:  N/A       Imaging results reviewed over the past 24 hrs:   Recent Results (from the past 24 hours)   CT Head w/o Contrast    Narrative    EXAM: CT HEAD W/O CONTRAST  LOCATION: [facility]  DATE: 5/12/2025    INDICATION: repeat CT intracranial bleed  COMPARISON: 5/12/2025 at 10:48 AM.  TECHNIQUE: Routine CT Head without IV contrast. Multiplanar reformats. Dose reduction techniques were used.    FINDINGS:  INTRACRANIAL CONTENTS: Stable appearance to an intraparenchymal hemorrhage finding involving the right temporal  lobe with surrounding vasogenic edema and mass effect. This table midline shift of approximately 5 mm to the left at the level of the lateral   ventricles. Sulcal effacement of the right cerebral hemisphere is redemonstrated. Postsurgical changes are stable.        Impression    IMPRESSION:  1.  Stable postsurgical changes and high density blood products associated with a mass lesion in the right temporal lobe, unchanged size from the prior study with associated vasogenic edema. No new hemorrhage findings.

## 2025-05-13 NOTE — CONSULTS
Neurosurgery Consult    Assessment  83 yo male with right temporal lobe hemorrhagic mass in setting up thrombocytopenia.  Status post craniotomy for resection of tumor 4/22/25.    Plan:  No urgent NS intervention indicated.  Continue to hold anticoagulation  Platelet transfusion, goal 75 K  Neuro checks every 2 hours  Systolic BP less than 150.  Repeat head CT now.      HPI    Mr. Perea is a pleasant 84 male known to NSGY as status post right fronto-temporal craniotomy with neoplasm excision with Dr. Erickson on 4/22/2025. Per report he was doing well until Friday afternoon when it was reported he had a gradual decline in functioning at ARU requiring additional assistance. Per referring CNP, he was previously requiring modified independent assistance of a cane which progressed to needing walker assistance. Exam worsened to include generaized malaise, lethargy and possible left facial droop and generalized weakness. Platelet this AM 49. Did not resume Eliquis yet.   Currently complains of a mild headache.    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  He follows commands well and is moving all four extremities well with left upper extremity pronator drift.  GCS 15.  Incision C/D/I.    Imaging    Head CT:    IMPRESSION:   1. Increased size and attenuation of the right temporal lobe  hemorrhagic mass since the recent CT from 4/23/2025, concerning for  acute hemorrhage within the  mass. New left intraventricular  hemorrhage.  2. Increased leftward midline shift of 5 mm, previously 3 mm, and  partial effacement of the right lateral ventricle.  3. Slightly increased size of the thin mixed density extra-axial  collection underlying the right craniotomy site.    LABS:  Platelet count 49k      Raeann Bishop, ANAIS  [facility] Neurosurgery  [facility]  [address] [place] [id number]

## 2025-05-13 NOTE — PROGRESS NOTES
Repeat head CT stable    IMPRESSION:  1.  Stable postsurgical changes and high density blood products associated with a mass lesion in the right temporal lobe, unchanged size from the prior study with associated vasogenic edema. No new hemorrhage findings.      PLAN:  Okay for diet from NS standpoint  Platelet goal 75k    MARCIO Sandoval  [facility] Neurosurgery  [address] [address]

## 2025-05-13 NOTE — PROGRESS NOTES
[facility]    Neurosurgery Progress Note    Date of Service (when I saw the patient): 05/13/2025     Assessment & Plan   Mr. Perea is a pleasant 84 male known to NSGY as status post right fronto-temporal craniotomy with neoplasm excision with Dr. Erickson on 4/22/2025. Per report he was doing well until Friday afternoon when it was reported he had a gradual decline in functioning at ARU requiring additional assistance. Per referring CNP, he was previously requiring modified independent assistance of a cane which progressed to needing walker assistance. Exam worsened to include generaized malaise, lethargy and possible left facial droop and generalized weakness. Platelet this AM 49. He received platelets and platelet level increased to 86 this AM.     Today was seen in ICU. Complains of a slight headache.     Plan:  -Radiation oncology consulted, appreciate recommendations  -Activity as tolerated, appreciate therapies  -Platelet goal >75k  -Neuro checks ever 2 hours  -SBP <150    ADDENDUM: Discussed with radiation oncology who plan to begin radiation therapy next week. Plan to await further recommendations from therapies regarding if radiation should be inpatient or outpatient.     I have discussed the following assessment and plan Dr. Erickson who is in agreement with initial plan and will follow up with further consultation recommendations.    Maria T Baker, LIBERTY  [facility] Neurosurgery  [address] [place] [id number]  Tel [phone]  Fax [phone]  Securely message with [employer] (more info)  Text page via [employer] Paging/Directory     Interval History   Stable    Physical Exam   Temp: 98  F (36.7  C) Temp src: Oral BP: 105/66 Pulse: 56   Resp: 15 SpO2: 98 % O2 Device: None (Room air)    There were no vitals filed for this visit.  Vital Signs with Ranges  Temp:  [97.9  F (36.6  C)-99.6  F (37.6  C)] 98  F (36.7  C)  Pulse:  [49-75] 56  Resp:  [12-18]  15  BP: (100-151)/(43-69) 105/66  SpO2:  [93 %-100 %] 98 %  I/O last 3 completed shifts:  In: 115 [IV Piggyback:115]  Out: 425 [Urine:425]     , Blood pressure 105/66, pulse 56, temperature 98  F (36.7  C), temperature source Oral, resp. rate 15, SpO2 98%.  0 lbs 0 oz    NEUROLOGICAL EXAMINATION:   Mental status:  Alert and Oriented x 3, speech is fluent.  Cranial nerves:  II-XII intact.   Motor:  Strength is 5/5 throughout the upper and lower extremities  Shoulder Abduction:  Right:  5/5   Left:  5/5  Biceps:                      Right:  5/5   Left:  5/5  Triceps:                     Right:  5/5   Left:  5/5  Wrist Extensors:       Right:  5/5   Left:  5/5  Wrist Flexors:           Right:  5/5   Left:  5/5  interosseus :            Right:  5/5   Left:  5/5   Hip Flexor:                Right: 5/5  Left:  5/5  Hip Adductor:             Right:  5/5  Left:  5/5  Hip Abductor:             Right:  5/5  Left:  5/5  Gastroc Soleus:        Right:  5/5  Left:  5/5  Tib/Ant:                      Right:  5/5  Left:  5/5  EHL:                     Right:  5/5  Left:  5/5  Sensation:  intact  Coordination:  Smooth finger to nose testing.   Slight left pronator drift.     Medications   Current Facility-Administered Medications   Medication Dose Route Frequency Provider Last Rate Last Admin     Current Facility-Administered Medications   Medication Dose Route Frequency Provider Last Rate Last Admin    artificial saliva (BIOTENE MT) solution 1 spray  1 spray Mouth/Throat At Bedtime Armand Leal MD   1 spray at 05/12/25 2213    atorvastatin (LIPITOR) tablet 40 mg  40 mg Oral Daily Armand Leal MD   40 mg at 05/13/25 0828    azelastine (ASTELIN) nasal spray 2 spray  2 spray Both Nostrils BID Armand Leal MD   2 spray at 05/13/25 0851    carboxymethylcellulose PF (REFRESH PLUS) 0.5 % ophthalmic solution 1 drop  1 drop Both Eyes BID Armand Leal MD   1 drop at 05/13/25 0828    fenofibrate (LOFIBRA) tablet 54 mg  54 mg Oral Daily  with breakfast Armand Leal MD   54 mg at 05/13/25 0828    insulin aspart (NovoLOG) injection (RAPID ACTING)  1-7 Units Subcutaneous TID AC Armand Leal MD   2 Units at 05/13/25 1138    insulin aspart (NovoLOG) injection (RAPID ACTING)  1-5 Units Subcutaneous At Bedtime Armand Leal MD        insulin glargine (LANTUS PEN) injection 15 Units  15 Units Subcutaneous QAM AC Armand Leal MD   15 Units at 05/13/25 0829    levETIRAcetam (KEPPRA) 1,500 mg in sodium chloride 0.9 % 125 mL intermittent infusion  1,500 mg Intravenous BID Armand Leal  mL/hr at 05/13/25 0851 1,500 mg at 05/13/25 0851    levothyroxine (SYNTHROID/LEVOTHROID) tablet 125 mcg  125 mcg Oral Daily Armand Leal MD   125 mcg at 05/13/25 0828       Data     CBC RESULTS:   Recent Labs   Lab Test 05/13/25  1158 05/13/25  0321   WBC  --  2.2*   RBC  --  2.71*   HGB  --  8.1*   HCT  --  24.7*   MCV 92 91   MCH  --  29.9   MCHC  --  32.8   RDW  --  15.4*   PLT 89* 86*     Basic Metabolic Panel:  Lab Results   Component Value Date     05/13/2025     07/01/2021      Lab Results   Component Value Date    POTASSIUM 4.0 05/13/2025    POTASSIUM 3.7 04/22/2025    POTASSIUM 4.3 12/12/2022    POTASSIUM 4.1 07/01/2021     Lab Results   Component Value Date    CHLORIDE 102 05/13/2025    CHLORIDE 101 12/12/2022    CHLORIDE 106 07/01/2021     Lab Results   Component Value Date    STARR 9.1 05/13/2025    STARR 10.6 07/01/2021     Lab Results   Component Value Date    CO2 25 05/13/2025    CO2 30 12/12/2022    CO2 29 07/01/2021     Lab Results   Component Value Date    BUN 8.8 05/13/2025    BUN 25 12/12/2022    BUN 20 07/01/2021     Lab Results   Component Value Date    CR 0.96 05/13/2025    CR 1.69 07/01/2021     Lab Results   Component Value Date     05/13/2025     12/12/2022     07/01/2021     INR:  Lab Results   Component Value Date    INR 1.14 05/12/2025    INR 1.21 04/22/2025    INR 1.30 04/22/2025    INR 1.43 05/22/2019     INR 1.09 03/17/2019    INR 1.37 03/14/2019    INR 0.94 03/30/2012    INR 0.90 05/12/2005

## 2025-05-13 NOTE — CONSULTS
Care Management Initial Consult    General Information  Assessment completed with: Patient, Patient (patient declined this writer to call his Son Today (Harpreet))  Type of CM/SW Visit: CM Role Introduction    Primary Care Provider verified and updated as needed: Yes   Readmission within the last 30 days: other (see comments) (admitted from ARU for Somnolence)   Return Category: New Diagnosis  Reason for Consult: discharge planning  Advance Care Planning:            Communication Assessment  Patient's communication style: spoken language (English or Bilingual)    Hearing Difficulty or Deaf: yes   Wear Glasses or Blind: yes    Cognitive  Cognitive/Neuro/Behavioral: WDL  Level of Consciousness: alert  Arousal Level: opens eyes spontaneously  Orientation: oriented x 4  Mood/Behavior: calm, cooperative  Best Language: 0 - No aphasia  Speech: clear    Living Environment:   People in home: child(sharita), adult (Son Harpreet)  Harpreet  Current living Arrangements: apartment      Able to return to prior arrangements: other (see comments) (pending ongoing recommendations and therapy assessment)       Family/Social Support:  Care provided by: self  Provides care for: no one  Marital Status:   Support system: Children          Description of Support System: Supportive, Involved         Current Resources:   Patient receiving home care services: No (due to discharge from ARU with [facility] homecare RN/Pt/Ot/SLP and HHA)        Community Resources: None  Equipment currently used at home: cane, straight  Supplies currently used at home: None    Employment/Financial:  Employment Status: retired        Financial Concerns: none   Referral to Financial Worker: No       Does the patient's insurance plan have a 3 day qualifying hospital stay waiver?  Yes     Which insurance plan 3 day waiver is available? Alternative insurance waiver    Will the waiver be used for post-acute placement? Undetermined at this time    Lifestyle & Psychosocial  Needs:  Social Drivers of Health     Food Insecurity: Low Risk  (5/12/2025)    Food Insecurity     Within the past 12 months, did you worry that your food would run out before you got money to buy more?: No     Within the past 12 months, did the food you bought just not last and you didn t have money to get more?: No   Depression: Not at risk (2/14/2025)    PHQ-2     PHQ-2 Score: 0   Housing Stability: Low Risk  (5/12/2025)    Housing Stability     Do you have housing? : Yes     Are you worried about losing your housing?: No   Tobacco Use: Medium Risk (2/17/2025)    Patient History     Smoking Tobacco Use: Former     Smokeless Tobacco Use: Never     Passive Exposure: Not on file   Financial Resource Strain: Low Risk  (5/12/2025)    Financial Resource Strain     Within the past 12 months, have you or your family members you live with been unable to get utilities (heat, electricity) when it was really needed?: No   Alcohol Use: Not on file   Transportation Needs: Low Risk  (5/12/2025)    Transportation Needs     Within the past 12 months, has lack of transportation kept you from medical appointments, getting your medicines, non-medical meetings or appointments, work, or from getting things that you need?: No   Physical Activity: Insufficiently Active (2/9/2025)    Exercise Vital Sign     Days of Exercise per Week: 7 days     Minutes of Exercise per Session: 20 min   Interpersonal Safety: Low Risk  (5/4/2025)    Interpersonal Safety     Do you feel physically and emotionally safe where you currently live?: Yes     Within the past 12 months, have you been hit, slapped, kicked or otherwise physically hurt by someone?: No     Within the past 12 months, have you been humiliated or emotionally abused in other ways by your partner or ex-partner?: No   Stress: No Stress Concern Present (2/9/2025)    [employer] - Occupational Stress Questionnaire     Feeling of Stress : Not at all   Social  Connections: Unknown (2/9/2025)    Social Connection and Isolation Panel [NHANES]     Frequency of Communication with Friends and Family: Not on file     Frequency of Social Gatherings with Friends and Family: Three times a week     Attends Sabianism Services: Not on file     Active Member of Clubs or Organizations: Not on file     Attends Club or Organization Meetings: Not on file     Marital Status: Not on file   Health Literacy: Not on file       Functional Status:  Prior to admission patient needed assistance:   Dependent ADLs:: Ambulation-walker          Mental Health Status:  Mental Health Status: No Current Concerns       Chemical Dependency Status:  Chemical Dependency Status: No Current Concerns             Values/Beliefs:  Spiritual, Cultural Beliefs, Sabianism Practices, Values that affect care: yes  Description of Beliefs that Will Affect Care: Christianity            Discussed  Partnership in Safe Discharge Planning  document with patient/family: No    Additional Information:  Writer met with the patient at the bedside. Patient is A+Ox3-4 (?situation). Writer explained role and scope of discharge planning.   Patient able to relay to this writer that he was at ARU and plan was for discharge to home with family and homecare, however he had to come back for new findings.  Patient asked this writer when he is discharging. Writer noted that he has consults and we would need to see further recommendations.  Writer offered to call patient's Son Harpreet, who lives with the patient, however patient declined at this time.  Patient is hopeful to go home soon with family and homecare and is hopeful to attend his Grandson's wedding in [place] in the coming weeks.  Patient does not have any additional questions at this time and is aware that we will continue to follow for further discharge planning needs as identified.  Writer put a referral in for homecare for RN/PT/OT/SLP and HHA through [facility] referral site for  discharge planning.  Addendum: 5/13 11:31  Per note from radiation therapy they have reached out to Daughter to start the process of stereotactic RT 5 fractions at [place].  Will need to check with family for outpatient RT appointments if patient is medically stable for discharge to home with family and homecare.       Trina Avitia RN, BSN, ACM   Care Transitions Specialist  [facility]  Care Transitions Specialist  [address] [place] [address]  [email or web address]  Office: [phone] Fax: [phone]  [facility]

## 2025-05-13 NOTE — PROGRESS NOTES
05/13/25 0847   Appointment Info   Signing Clinician's Name / Credentials (OT) Savanah CabreraOTR/L   Rehab Comments (OT) Initial Evaluation   Living Environment   People in Home child(sharita), adult  (son)   Current Living Arrangements apartment   Transportation Anticipated family or friend will provide   Living Environment Comments pt reports his dtr and grandkids and help or provide transportation as needed.   Self-Care   Equipment Currently Used at Home cane, straight   Fall history within last six months yes   Instrumental Activities of Daily Living (IADL)   Previous Responsibilities meal prep;housekeeping;laundry;shopping;medication management;finances;driving   IADL Comments pt reports, prior to surgery, etc, pt independent with all IADL's.   General Information   Onset of Illness/Injury or Date of Surgery 05/12/25   Referring Physician Armand Leal MD   Patient/Family Therapy Goal Statement (OT) home   Additional Occupational Profile Info/Pertinent History of Current Problem Mr. Perea is a pleasant 84 male known [place] [facility] [place] post right fronto-temporal craniotomy with neoplasm excision with Dr. Erickson on 4/22/2025. Per report he was doing well until Friday afternoon when it was reported he had a gradual decline in functioning at [facility] requiring additional assistance. Per referring CNP, he was previously requiring modified independent assistance of a cane which progressed to needing walker assistance. Exam worsened to include generaized malaise, lethargy and possible left facial droop and generalized weakness. Discharged to rehab 5/4, then with gradual decline starting 5/9, increased weakness and somnolence with CT head with increased R temporal lobe hemorrahgic mass and new left intraventricular hemorrhage, transferred to [facility] ICU.  *Repeat CT stable, discussed with neurosurgery no surgical intervention planned, steroids not needed at this time.   Existing Precautions/Restrictions  fall;other (see comments)  (SBP goal <150, HOB 30 degrees and above.)   Cognitive Status Examination   Orientation Status person;place  (reports month is June)   Affect/Mental Status (Cognitive) WFL   Follows Commands WFL;delayed response/completion;increased processing time needed   Safety Deficit at risk behavior observed   Cognitive Status Comments cont to monitor and assess cognition.   Visual Perception   Visual Impairment/Limitations corrective lenses for reading   Impact of Vision Impairment on Function (Vision) reports no changes, appears to be tracking, cont to monitor.   Sensory   Sensory Comments denies   Pain Assessment   Patient Currently in Pain No   Range of Motion Comprehensive   Comment, General Range of Motion B UE AROM WFL   Strength Comprehensive (MMT)   Comment, General Manual Muscle Testing (MMT) Assessment L  strength appears slightly weaker than R , B UE strength appear symmetrical.   Muscle Tone Assessment   Muscle Tone Quick Adds No deficits were identified   Coordination   Upper Extremity Coordination Left UE impaired   Gross Motor Coordination no to min impairment   Fine Motor Coordination possible L hand FMC min impairment, cont to monitor.   Transfer Skill: Bed to Chair/Chair to Bed   Bed-Chair [place] (Transfers) contact guard   Assistive Device (Bed-Chair Transfers) rolling walker   Sit-Stand Transfer   Sit-Stand [place] (Transfers) contact guard   Assistive Device (Sit-Stand Transfers) walker, front-wheeled   Lower Body Dressing Assessment/Training   [place] Level (Lower Body Dressing) contact guard assist   Grooming Assessment/Training   [place] Level (Grooming)   (SBA)   Clinical Impression   Criteria for Skilled Therapeutic Interventions Met (OT) Yes, treatment indicated   OT Diagnosis impaired I with ADL's and functional mobility.   OT Problem List-Impairments impacting ADL problems related to;activity tolerance  impaired;balance;cognition;strength;post-surgical precautions;coordination   Assessment of Occupational Performance 3-5 Performance Deficits   Identified Performance Deficits impaired I with toilet and shower transfer, dressing, shopping, med mgmt, etc   Planned Therapy Interventions (OT) ADL retraining;cognition;transfer training;home program guidelines;progressive activity/exercise;neuromuscular re-education;motor coordination training;fine motor coordination training;risk factor education   Clinical Decision Making Complexity (OT) detailed assessment/moderate complexity   Risk & Benefits of therapy have been explained evaluation/treatment results reviewed;care plan/treatment goals reviewed;risks/benefits reviewed;current/potential barriers reviewed;participants voiced agreement with care plan;participants included;patient   OT Total Evaluation Time   OT Eval, Moderate Complexity Minutes (53654) 10   OT Goals   Therapy Frequency (OT) Daily   OT Predicted Duration/Target Date for Goal Attainment 05/19/25   OT: Hygiene/Grooming modified independent;while standing;independent   OT: Upper Body Dressing Modified independent;within precautions;Independent   OT: Lower Body Dressing Modified independent;Independent;within precautions   OT: Toilet Transfer/Toileting Modified independent;Independent;toilet transfer;cleaning and garment management;using adaptive equipment;within precautions   OT: Cognitive Patient/caregiver will verbalize understanding of cognitive assessment results/recommendations as needed for safe discharge planning   OT: Goal 1 Pt will set up one week of medications with 4 pill bottles independently without cues.   Therapeutic Activities   Therapeutic Activity Minutes (67990) 41   Symptoms noted during/after treatment fatigue   Treatment Detail/Skilled Intervention OT: pt agreed to get up into chair, supine to sit with SBA and cues to sit for awhile to get used to being up. pt denies dizziness,etc. pt  able to scoot to EOB SBA and sit <> Stand and ambulates with FWW with cues for hand placmenet and safety to window and back and then to chair with A for line med mgmt and cues for safety and CGA for safety. pt seated in chair, VS monitored and appear stable within parameters. pt reports he's ready to go home. pt is alert and oriented to self, , place, situation, currently year but reports it's . pt reoriented to correct month. pt aware of -- in an emergency. Pt declined to stand at sink to wash face. pt wanting to eat breakfast first. increased time needed to set up room and position pt in chair safety. pt denies any changes with B UE coordination or strength.   OT Discharge Planning   OT Plan OT plan; montior cognition, cog screen when appropriate, g/h at sink, monitor L UE coordination or toilet trasnfer, dressing, etc   OT Discharge Recommendation (DC Rec) home with assist;home with home care occupational therapy   OT Rationale for DC Rec Pt reports prior to getting sick he was independent with all ADl/IADL's and functional mobility with SEC. pt currently limited due to overall weakness, possibly L hand slightly weaker than R hand and possible min Lhand in coordination, cont to monitor as pt denies any changes. ANticiapte with cont'd medical mgmt and therapy and if pt's family able to provide initial 24 hr S and A with transportation, car and shower transfer, all IADL's for now then would like be able to return home with family A with as previous listed and home RN for med mgmt, OT for home safety wtih ADL.IADL's and cognition and PT for balance and strengthening.   OT Brief overview of current status Goals of therapy will be to address safe mobility and make recs for d/c to next level of care. Pt and RN will continue to follow all falls risk precautions as documented by RN staff while hospitalized. SBA supine to sit, CGA sit <> stand and transfer to chair and CGA for LE dress. pt alert and oriented to  self, , current year, place, confused regarding current month.   OT Total Distance Amb During Session (feet) 12   Total Session Time   Timed Code Treatment Minutes 41   Total Session Time (sum of timed and untimed services) 51

## 2025-05-13 NOTE — PROGRESS NOTES
"   05/13/25 1600   Appointment Info   Signing Clinician's Name / Credentials (PT) Treasure Vogel PT, DPT   Living Environment   People in Home child(sharita), adult  (Son)   Current Living Arrangements apartment   Transportation Anticipated family or friend will provide   Self-Care   Usual Activity Tolerance good   Current Activity Tolerance fair   Regular Exercise No   Equipment Currently Used at Home cane, straight   Fall history within last six months yes   Number of times patient has fallen within last six months 2   Activity/Exercise/Self-Care Comment Use of a cane at baseline, has a shower chair prn, reports independence with ADLs at baseline.   General Information   Onset of Illness/Injury or Date of Surgery 05/12/25   Referring Physician Armand Leal MD   Patient/Family Therapy Goals Statement (PT) Eager to get home.   Pertinent History of Current Problem (include personal factors and/or comorbidities that impact the POC) 85 yo male adm to [facility] from [facility] with Increased physical assist needed, fatigue, somnulant. Had been scheduled to return home with family assist and home cares on 5/12/25, but was re-adm to [facility] secondary to the above. 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 CT head with right temporal lobe mass with associated hemorrhagic aspect, significant surrounding vasogenic edema, and right to left midline shift, later confirmed on MRI. Patient is now s/p right frontotemporal craniotomy for resection of right temporal brain tumor on 4/22/2025. 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 Post operative " "course complicated by cardiopulmonary arrest in PACU requiring re-intubation and CPR with ROSC along with seizure in PACU.\"   Existing Precautions/Restrictions fall  (BP <150 systolic)   General Observations [place] on the R, per notes since surgery. Reading glasses.   Cognition   Affect/Mental Status (Cognition) WFL;agitated;flat/blunted affect  (Irritable and somehwat agitated with subjective questions and having to do therapy.)   Orientation Status (Cognition) oriented x 4   Follows Commands (Cognition) WFL;follows one-step commands;delayed response/completion;increased processing time needed;physical/tactile prompts required;repetition of directions required;verbal cues/prompting required   Behavioral Issues verbal outbursts;overwhelmed easily  (Pt frustrates easily)   Safety Deficit (Cognition) minimal deficit;moderate deficit;insight into deficits/self-awareness;judgment;problem-solving;safety precautions awareness;safety precautions follow-through/compliance   Cognitive Status Comments Defer formal assessment to OT. Pt with constant \"picking\" at his head/incision -cued to avoid this with rationalle multiple times throughout. Lacks insight to what appears to be a L neglect, more noticable once up and moving in the schuler vs small movements in the room.   Integumentary/Edema   Integumentary/Edema Comments R incision post crani, dry intact, pt with constant touching of the incision.   Posture    Posture Forward head position;Protracted shoulders   Range of Motion (ROM)   ROM Comment B LEs WFL   Strength (Manual Muscle Testing)   Strength Comments L LE hip flexion, knee flexion, knee extension and DF grossly 3+-4/5 with assessment in supine. LLE 4+-5 for the same muscle groups. L hand  weaker vs R, but still strong, push/pull gross motion weaker on the L vs the R.   Bed Mobility   Comment, (Bed Mobility) Min A sup>sit secondary to slower processing and generalized weakness, line management.   Transfers   Comment, " (Transfers) SiT>Stand slow to full extension, use of bed for LE support up to standing. CGA at gait blt for safety.   Gait/Stairs (Locomotion)   Comment, (Gait/Stairs) Bedside gait with CGA; walker, crouched knees, L >vs the R. Inattention vs neglect on the L side with objects on the L .   Balance   Balance Comments Sitting balance fair, needs close supervision to CGA-pt with LOB when using UEs to do partial stand and push bottom back on bed futher -L hand slips. Standing balance with increased time up LEs weaken and pt slowly sinks /crouches at th eknees.   Sensory Examination   Sensory Perception Comments Denies n/t; gross light touch in tact B-previous notes intdicate deminished sensatoin on the R. Le.   Coordination   Coordination Comments Smooth persuit note R gaze saccades, actual saccade assessment R side slips and overshoots vs the R. Attempted VOR -pt doesn't allow for head movement. L UE with impaire dfine motor.   Clinical Impression   Criteria for Skilled Therapeutic Intervention Yes, treatment indicated   PT Diagnosis (PT) Impaired Gait   Influenced by the following impairments Weakness, L sided paresis, decreased balance, decreased functional activity tolerance, impaired safety awareness.   Functional limitations due to impairments Decreased functional independence with mobility   Clinical Presentation (PT Evaluation Complexity) stable   Clinical Presentation Rationale see MR   Clinical Decision Making (Complexity) low complexity   Planned Therapy Interventions (PT) balance training;bed mobility training;gait training;home exercise program;neuromuscular re-education;patient/family education;stair training;strengthening;transfer training;motor coordination training;postural re-education;ROM (range of motion);progressive activity/exercise;risk factor education;home program guidelines   Risk & Benefits of therapy have been explained evaluation/treatment results reviewed;care plan/treatment goals  "reviewed;risks/benefits reviewed;current/potential barriers reviewed;participants voiced agreement with care plan;participants included;patient   PT Total Evaluation Time   PT Eval, Low Complexity Minutes (80005) 10   Physical Therapy Goals   PT Frequency Daily   PT Predicted Duration/Target Date for Goal Attainment 05/19/25   PT Goals Bed Mobility;Transfers;Gait;Stairs;PT Goal 1   PT: Bed Mobility Independent;Supine to/from sit;Rolling   PT: Transfers Modified independent;Sit to/from stand;Bed to/from chair;Assistive device   PT: Gait Supervision/stand-by assist;Assistive device;Greater than 200 feet   PT: Stairs Supervision/stand-by assist;Greater than 10 stairs;Rail on both sides   PT: Goal 1 Pt will complete the TUG in <13.5 seconds with indication of decreased fall risk   Therapeutic Activity   Therapeutic Activities: dynamic activities to improve functional performance Minutes (32957) 15   Treatment Detail/Skilled Intervention Dangled EOB, ompleted edu ons afety with walker and sit<>stand. Pt frustrated with recommendation to avoid pulling on the walker up to standing. Visual demo of why provided pt states \"Now you are just changing the rules on me.\" Redirectible and continues to participate. Sup>sit CGA. Stand>sit cued to scoot bottom back on the bed, pt again lets the L hand slip off of the bed, CGA to recover. Close SBA with return to supine. UEs elevated, HOB at 30, all needs in reach, VSS, alarm on, RN updated at PT exit.   Gait Training   Gait Training Minutes (21439) 15   Treatment Detail/Skilled Intervention Increased time for lien maangement, space lab set up. Gait training included edu on walker safety , proximity and walker adjustment for height. Verbal and tactile cues for standing tall and safety awarenss. Pt drifts significantly L, when brought to  ept's attention he states \"I am 84 years old, I know how to walk and I'm not going to walk into the middle of a wall.\" Pt proceeds wto walk, " "therapist stopped cues and pt catches the L wheel on a cart, and 2 people had to step out of the way as he was very close on the L side. Therapist moved to the L side, pt very close to therapist for remainder of the walk. Note pt with increased knee flexion/crouched gait and less conversation the further he walked. Caught th eL foot on the post of the walker, Min A to recover. Offered seated rest and pt declines. Upon return to room pt states \"that's what was happening to my legs when I came in her the very first time.\" VS assessed pre, during and post ambulation. STable.   Distance in Feet 200   PT Discharge Planning   PT Plan PT plan standing balance and dyanmic gait activites. WAlker or cane trial?   PT Discharge Recommendation (DC Rec) Transitional Care Facility   PT Rationale for DC Rec At this time anticiapte TCU need as pt lacks insight to deficits and is a fall risk, L sided paresis and L sided inattention noted. Pt will benefit from continued skile rehab services to progress independence and safety prior to return home. Note pt with close to disch from [facility] day of admit to [facility] following decline in function and leghargy. At that time the pt was using a walker with CGA for mobility.   PT Brief overview of current status Goals of therapy will be to address safe mobility and make recs for d/c to next level of care. Pt and RN will continue to follow all falls risk precautions as documented by RN staff while hospitalized.  (A x 1 and walker)   PT Total Distance Amb During Session (feet) 200   Physical Therapy Time and Intention   Timed Code Treatment Minutes 30   Total Session Time (sum of timed and untimed services) 40     "

## 2025-05-14 ENCOUNTER — APPOINTMENT (OUTPATIENT)
Dept: PHYSICAL THERAPY | Facility: CLINIC | Age: 85
DRG: 064 | End: 2025-05-14
Attending: INTERNAL MEDICINE
Payer: COMMERCIAL

## 2025-05-14 ENCOUNTER — APPOINTMENT (OUTPATIENT)
Dept: SPEECH THERAPY | Facility: CLINIC | Age: 85
DRG: 064 | End: 2025-05-14
Attending: INTERNAL MEDICINE
Payer: COMMERCIAL

## 2025-05-14 ENCOUNTER — APPOINTMENT (OUTPATIENT)
Dept: GENERAL RADIOLOGY | Facility: CLINIC | Age: 85
DRG: 064 | End: 2025-05-14
Attending: INTERNAL MEDICINE
Payer: COMMERCIAL

## 2025-05-14 ENCOUNTER — APPOINTMENT (OUTPATIENT)
Dept: CT IMAGING | Facility: CLINIC | Age: 85
DRG: 064 | End: 2025-05-14
Attending: PHYSICIAN ASSISTANT
Payer: COMMERCIAL

## 2025-05-14 LAB
ANION GAP SERPL CALCULATED.3IONS-SCNC: 11 MMOL/L (ref 7–15)
BUN SERPL-MCNC: 10.8 MG/DL (ref 8–23)
CALCIUM SERPL-MCNC: 9.7 MG/DL (ref 8.8–10.4)
CHLORIDE SERPL-SCNC: 104 MMOL/L (ref 98–107)
CREAT SERPL-MCNC: 0.93 MG/DL (ref 0.67–1.17)
EGFRCR SERPLBLD CKD-EPI 2021: 81 ML/MIN/1.73M2
ERYTHROCYTE [DISTWIDTH] IN BLOOD BY AUTOMATED COUNT: 15.4 % (ref 10–15)
FERRITIN SERPL-MCNC: 457 NG/ML (ref 31–409)
FOLATE SERPL-MCNC: 13.1 NG/ML (ref 4.6–34.8)
GLUCOSE BLDC GLUCOMTR-MCNC: 170 MG/DL (ref 70–99)
GLUCOSE BLDC GLUCOMTR-MCNC: 177 MG/DL (ref 70–99)
GLUCOSE BLDC GLUCOMTR-MCNC: 177 MG/DL (ref 70–99)
GLUCOSE BLDC GLUCOMTR-MCNC: 207 MG/DL (ref 70–99)
GLUCOSE BLDC GLUCOMTR-MCNC: 211 MG/DL (ref 70–99)
GLUCOSE SERPL-MCNC: 205 MG/DL (ref 70–99)
HAPTOGLOB SERPL-MCNC: 211 MG/DL (ref 30–200)
HCO3 SERPL-SCNC: 24 MMOL/L (ref 22–29)
HCT VFR BLD AUTO: 26.8 % (ref 40–53)
HGB BLD-MCNC: 8.6 G/DL (ref 13.3–17.7)
MCH RBC QN AUTO: 29.7 PG (ref 26.5–33)
MCHC RBC AUTO-ENTMCNC: 32.1 G/DL (ref 31.5–36.5)
MCV RBC AUTO: 92 FL (ref 78–100)
PLATELET # BLD AUTO: 88 10E3/UL (ref 150–450)
POTASSIUM SERPL-SCNC: 4.5 MMOL/L (ref 3.4–5.3)
RBC # BLD AUTO: 2.9 10E6/UL (ref 4.4–5.9)
SODIUM SERPL-SCNC: 139 MMOL/L (ref 135–145)
TOTAL PROTEIN SERUM FOR ELP: 5.4 G/DL (ref 6.4–8.3)
VIT B12 SERPL-MCNC: 554 PG/ML (ref 232–1245)
WBC # BLD AUTO: 1.6 10E3/UL (ref 4–11)

## 2025-05-14 PROCEDURE — 82310 ASSAY OF CALCIUM: CPT | Performed by: INTERNAL MEDICINE

## 2025-05-14 PROCEDURE — 120N000013 HC R&B IMCU

## 2025-05-14 PROCEDURE — 84165 PROTEIN E-PHORESIS SERUM: CPT | Mod: 26 | Performed by: PATHOLOGY

## 2025-05-14 PROCEDURE — 84165 PROTEIN E-PHORESIS SERUM: CPT | Mod: TC | Performed by: PATHOLOGY

## 2025-05-14 PROCEDURE — 258N000003 HC RX IP 258 OP 636: Performed by: INTERNAL MEDICINE

## 2025-05-14 PROCEDURE — 250N000011 HC RX IP 250 OP 636: Mod: JZ | Performed by: INTERNAL MEDICINE

## 2025-05-14 PROCEDURE — 97116 GAIT TRAINING THERAPY: CPT | Mod: GP

## 2025-05-14 PROCEDURE — 99232 SBSQ HOSP IP/OBS MODERATE 35: CPT | Mod: 24 | Performed by: PHYSICIAN ASSISTANT

## 2025-05-14 PROCEDURE — 99232 SBSQ HOSP IP/OBS MODERATE 35: CPT

## 2025-05-14 PROCEDURE — 92526 ORAL FUNCTION THERAPY: CPT | Mod: GN | Performed by: SPEECH-LANGUAGE PATHOLOGIST

## 2025-05-14 PROCEDURE — 36415 COLL VENOUS BLD VENIPUNCTURE: CPT | Performed by: INTERNAL MEDICINE

## 2025-05-14 PROCEDURE — 82746 ASSAY OF FOLIC ACID SERUM: CPT | Performed by: INTERNAL MEDICINE

## 2025-05-14 PROCEDURE — 74230 X-RAY XM SWLNG FUNCJ C+: CPT

## 2025-05-14 PROCEDURE — 99232 SBSQ HOSP IP/OBS MODERATE 35: CPT | Performed by: INTERNAL MEDICINE

## 2025-05-14 PROCEDURE — 70450 CT HEAD/BRAIN W/O DYE: CPT

## 2025-05-14 PROCEDURE — 250N000013 HC RX MED GY IP 250 OP 250 PS 637: Performed by: INTERNAL MEDICINE

## 2025-05-14 PROCEDURE — 85018 HEMOGLOBIN: CPT | Performed by: INTERNAL MEDICINE

## 2025-05-14 PROCEDURE — 92611 MOTION FLUOROSCOPY/SWALLOW: CPT | Mod: GN | Performed by: SPEECH-LANGUAGE PATHOLOGIST

## 2025-05-14 PROCEDURE — 84155 ASSAY OF PROTEIN SERUM: CPT | Performed by: INTERNAL MEDICINE

## 2025-05-14 PROCEDURE — 85048 AUTOMATED LEUKOCYTE COUNT: CPT | Performed by: INTERNAL MEDICINE

## 2025-05-14 RX ORDER — BARIUM SULFATE 400 MG/ML
SUSPENSION ORAL ONCE
Status: COMPLETED | OUTPATIENT
Start: 2025-05-14 | End: 2025-05-14

## 2025-05-14 RX ADMIN — INSULIN GLARGINE 15 UNITS: 100 INJECTION, SOLUTION SUBCUTANEOUS at 08:29

## 2025-05-14 RX ADMIN — FENOFIBRATE 54 MG: 54 TABLET ORAL at 08:29

## 2025-05-14 RX ADMIN — CARBOXYMETHYLCELLULOSE SODIUM 1 DROP: 5 SOLUTION/ DROPS OPHTHALMIC at 20:32

## 2025-05-14 RX ADMIN — ACETAMINOPHEN 650 MG: 325 TABLET, FILM COATED ORAL at 17:57

## 2025-05-14 RX ADMIN — LEVETIRACETAM 1500 MG: 100 INJECTION, SOLUTION INTRAVENOUS at 09:57

## 2025-05-14 RX ADMIN — Medication 1 SPRAY: at 20:35

## 2025-05-14 RX ADMIN — ACETAMINOPHEN 650 MG: 325 TABLET, FILM COATED ORAL at 13:52

## 2025-05-14 RX ADMIN — LEVOTHYROXINE SODIUM 125 MCG: 125 TABLET ORAL at 08:29

## 2025-05-14 RX ADMIN — BARIUM SULFATE: 400 SUSPENSION ORAL at 13:38

## 2025-05-14 RX ADMIN — ATORVASTATIN CALCIUM 40 MG: 40 TABLET, FILM COATED ORAL at 08:29

## 2025-05-14 RX ADMIN — CARBOXYMETHYLCELLULOSE SODIUM 1 DROP: 5 SOLUTION/ DROPS OPHTHALMIC at 08:29

## 2025-05-14 RX ADMIN — LEVETIRACETAM 1500 MG: 100 INJECTION, SOLUTION INTRAVENOUS at 20:32

## 2025-05-14 RX ADMIN — AZELASTINE HYDROCHLORIDE 2 SPRAY: 137 SPRAY, METERED NASAL at 08:30

## 2025-05-14 RX ADMIN — AZELASTINE HYDROCHLORIDE 2 SPRAY: 137 SPRAY, METERED NASAL at 20:35

## 2025-05-14 ASSESSMENT — ACTIVITIES OF DAILY LIVING (ADL)
ADLS_ACUITY_SCORE: 59

## 2025-05-14 NOTE — PROGRESS NOTES
[facility]    Neurosurgery Progress Note    Date of Service (when I saw the patient): 05/14/2025     Assessment & Plan   Mr. Perea is a pleasant 84 male known to [facility] as status post right fronto-temporal craniotomy with neoplasm excision with Dr. Erickson on 4/22/2025. Per report he was doing well until Friday afternoon when it was reported he had a gradual decline in functioning at [facility] requiring additional assistance. Per referring CNP, he was previously requiring modified independent assistance of a cane which progressed to needing walker assistance. Exam worsened to include generaized malaise, lethargy and possible left facial droop and generalized weakness. Platelet this AM 49. Platelets stable at 88.    Today was seen lying in bed. Complains of a slight headache.     Plan:  -Radiation oncology consulted and planning radiation therapy to begin 5/19, appreciate recommendations  -Activity as tolerated, appreciate therapies  -Platelet goal >75k  -continue to closely monitor neurologic status  -SBP <150    Maria T Baker CNP  [facility] Neurosurgery  [address] [place] [id number]  Tel [phone]  Fax [phone]  Securely message with [employer] (more info)  Text page via [facility] Paging/Directory     Interval History   Stable    Physical Exam   Temp: 98.2  F (36.8  C) Temp src: Oral BP: 124/54 Pulse: 53   Resp: 14 SpO2: 97 % O2 Device: None (Room air)    There were no vitals filed for this visit.  Vital Signs with Ranges  Temp:  [97.4  F (36.3  C)-98.2  F (36.8  C)] 98.2  F (36.8  C)  Pulse:  [50-82] 53  Resp:  [14-18] 14  BP: ()/() 124/54  SpO2:  [96 %-100 %] 97 %  I/O last 3 completed shifts:  In: 725 [P.O.:600; IV Piggyback:125]  Out: 1000 [Urine:1000]     , Blood pressure 124/54, pulse 53, temperature 98.2  F (36.8  C), temperature source Oral, resp. rate 14, SpO2 97%.  0 lbs 0 oz    NEUROLOGICAL EXAMINATION:   Mental status:  Alert and  Oriented x 3, speech is fluent. Deaf in right ear.   Cranial nerves:  II-XII intact.   Motor:  [place]   Sensation:  intact  Coordination:  Smooth finger to nose testing.   Slight left pronator drift.     Medications   Current Facility-Administered Medications   Medication Dose Route Frequency Provider Last Rate Last Admin     Current Facility-Administered Medications   Medication Dose Route Frequency Provider Last Rate Last Admin    artificial saliva (BIOTENE MT) solution 1 spray  1 spray Mouth/Throat At Bedtime Arleen Winkler MD   1 spray at 05/13/25 2124    atorvastatin (LIPITOR) tablet 40 mg  40 mg Oral Daily Arleen Winkler MD   40 mg at 05/14/25 0829    azelastine (ASTELIN) nasal spray 2 spray  2 spray Both Nostrils BID Arleen Winkler MD   2 spray at 05/14/25 0830    carboxymethylcellulose PF (REFRESH PLUS) 0.5 % ophthalmic solution 1 drop  1 drop Both Eyes BID Arleen Winkler MD   1 drop at 05/14/25 0829    fenofibrate (LOFIBRA) tablet 54 mg  54 mg Oral Daily with breakfast Arleen Winkler MD   54 mg at 05/14/25 0829    insulin aspart (NovoLOG) injection (RAPID ACTING)  1-7 Units Subcutaneous TID AC Arleen Winkler MD   2 Units at 05/14/25 1210    insulin aspart (NovoLOG) injection (RAPID ACTING)  1-5 Units Subcutaneous At Bedtime Arleen Winkler MD        insulin glargine (LANTUS PEN) injection 15 Units  15 Units Subcutaneous QAM AC Arleen Winkler MD   15 Units at 05/14/25 0829    levETIRAcetam (KEPPRA) 1,500 mg in sodium chloride 0.9 % 125 mL intermittent infusion  1,500 mg Intravenous BID Arleen Winkler  mL/hr at 05/14/25 0957 1,500 mg at 05/14/25 0957    levothyroxine (SYNTHROID/LEVOTHROID) tablet 125 mcg  125 mcg Oral Daily Arleen Winkler MD   125 mcg at 05/14/25 0829    sodium chloride (PF) 0.9% PF flush 3 mL  3 mL Intracatheter Q8H BOYD Arleen Winkler MD   3 mL at 05/13/25 2126       Data     CBC RESULTS:   Recent Labs   Lab Test 05/13/25  1158 05/13/25  0321   WBC  --  2.2*   RBC  --  2.71*   HGB  --   8.1*   HCT  --  24.7*   MCV 92 91   MCH  --  29.9   MCHC  --  32.8   RDW  --  15.4*   PLT 89* 86*     Basic Metabolic Panel:  Lab Results   Component Value Date     05/13/2025     07/01/2021      Lab Results   Component Value Date    POTASSIUM 4.0 05/13/2025    POTASSIUM 3.7 04/22/2025    POTASSIUM 4.3 12/12/2022    POTASSIUM 4.1 07/01/2021     Lab Results   Component Value Date    CHLORIDE 102 05/13/2025    CHLORIDE 101 12/12/2022    CHLORIDE 106 07/01/2021     Lab Results   Component Value Date    STARR 9.1 05/13/2025    STARR 10.6 07/01/2021     Lab Results   Component Value Date    CO2 25 05/13/2025    CO2 30 12/12/2022    CO2 29 07/01/2021     Lab Results   Component Value Date    BUN 8.8 05/13/2025    BUN 25 12/12/2022    BUN 20 07/01/2021     Lab Results   Component Value Date    CR 0.96 05/13/2025    CR 1.69 07/01/2021     Lab Results   Component Value Date     05/13/2025     12/12/2022     07/01/2021     INR:  Lab Results   Component Value Date    INR 1.14 05/12/2025    INR 1.21 04/22/2025    INR 1.30 04/22/2025    INR 1.43 05/22/2019    INR 1.09 03/17/2019    INR 1.37 03/14/2019    INR 0.94 03/30/2012    INR 0.90 05/12/2005

## 2025-05-14 NOTE — PLAN OF CARE
Goal Outcome Evaluation:      Plan of Care Reviewed With: patient    Overall Patient Progress: improvingOverall Patient Progress: improving    Cared for [id number]    Outcome Evaluation: Pt neuros have remained stable - continues on q2h neuro checks. Report given to next RN. Pt belongings sent with pt.

## 2025-05-14 NOTE — PROGRESS NOTES
"  SLP VFSS Report 05/14/25 2818   Appointment Info   Signing Clinician's Name / Credentials (SLP) Tessie Serrato MA [facility] SLP   General Information   Onset of Illness/Injury or Date of Surgery 05/12/25   Referring Physician Dr. Winkler   Patient/Family Therapy Goal Statement (SLP) Agreed to POC goal, no other goal stated   Pertinent History of Current Problem Per notes: \"Mr. Perea is a pleasant 84 male known to [facility] as status post right fronto-temporal craniotomy with neoplasm excision with Dr. Erickson on 4/22/2025. Per report he was doing well until Friday afternoon when it was reported he had a gradual decline in functioning at ARU requiring additional assistance. Per referring CNP, he was previously requiring modified independent assistance of a cane which progressed to needing walker assistance. Exam worsened to include generaized malaise, lethargy and possible left facial droop and generalized weakness. Platelet this AM 49. Did not resume Eliquis yet.   Currently complains of a mild headache.\"    Per imaging 1. Increased size and attenuation of the right temporal lobe  hemorrhagic mass since the recent CT from 4/23/2025, concerning for  acute hemorrhage within the mass. New left intraventricular  hemorrhage.  2. Increased leftward midline shift of 5 mm, previously 3 mm, and  partial effacement of the right lateral ventricle.  3. Slightly increased size of the thin mixed density extra-axial  collection underlying the right craniotomy site.   General Observations Pt was alert and cooperative.   General Swallowing Observations   Swallowing Evaluation Videofluoroscopic swallow study (VFSS)   VFSS Evaluation   Radiologist Dr. Rankin   Views Taken left lateral;A/P  (AP brief view not saved - residue was bilateral in the pharynx)   Physical Location of Procedure [facility]   VFSS Textures Trialed thin liquids;pureed;soft & bite-sized;solid foods;slightly thick liquids   VFSS Eval: Thin Liquid Texture Trial   Mode of " Presentation, Thin Liquid cup;straw   Order of Presentation 1, 2, 3, 4, 9   Preparatory Phase poor bolus control   Oral Phase, Thin Liquid premature pharyngeal entry   Bolus Location When Swallow Triggered pyriforms   Pharyngeal Phase, Thin Liquid impaired epiglottic movement;impaired tongue base retraction;impaired pharyngoesophageal segment opening   Rosenbek's Penetration Aspiration Scale: Thin Liquid Trial Results 3 - contrast remains above the vocal cords, visible residue remains (penetration)   Diagnostic Statement Mild/mild-moderate pharyngeal residue, slight decreased epiglottic closure, mild decreased peristalsis, flash penetration by cup, increased penetration with residual by straw   VFSS Eval: Slightly Thick Liquids   Mode of Presentation cup   Order of Presentation 5   Preparatory Phase poor bolus control   Oral Phase premature pharyngeal entry   Bolus Location When Swallow Triggered pyriforms   Pharyngeal Phase impaired epiglottic movement;impaired tongue base retraction;impaired pharyngoesophageal segment opening   Rosenbek's Penetration Aspiration Scale 1 - no aspiration, contrast does not enter airway   Diagnostic Statement Mild/mild-moderate pharyngeal residue, slight decreased epiglottic closure, mild decreased peristalsis   VFSS Evaluation: Puree Solid Texture Trial   Mode of Presentation, Puree spoon   Order of Presentation 6   Preparatory Phase poor bolus control   Oral Phase, Puree premature pharyngeal entry   Bolus Location When Swallow Triggered valleculae   Pharyngeal Phase, Puree impaired epiglottic movement;impaired tongue base retraction;impaired pharyngoesophageal segment opening   Rosenbek's Penetration Aspiration Scale: Puree Food Trial Results 1 - no aspiration, contrast does not enter airway   Diagnostic Statement Mild-moderate pharyngeal residue, slight decreased epiglottic closure, mild decreased peristalsis; no new penetration   VFSS Eval: Soft & Bite Sized   Mode of Presentation  spoon   Order of presentation 7   Preparatory Phase WFL   Oral Phase WFL   Bolus Location When Swallow Triggered posterior angle of ramus   Pharyngeal Phase impaired epiglottic movement;impaired tongue base retraction;impaired pharyngoesophageal segment opening   Rosenbek's Penetration Aspiration Scale 1 - no aspiration, contrast does not enter airway   Diagnostic Statement Mild-moderate pharyngeal residue, slight decreased epiglottic closure, mild decreased peristalsis   VFSS Evaluation: Solid Food Texture Trial   Mode of Presentation, Solid spoon   Order of Presentation 8   Preparatory Phase WFL   Oral Phase, Solid WFL   Bolus Location When Swallow Triggered posterior angle of ramus   Pharyngeal Phase, Solid impaired tongue base retraction;impaired epiglottic movement;impaired pharyngoesophageal segment opening   Rosenbek's Penetration Aspiration Scale: Solid Food Trial Results 1 - no aspiration, contrast does not enter airway   Diagnostic Statement Mild-moderate pharyngeal residue, slight decreased epiglottic closure, mild decreased peristalsis; alternating to thin did not fully clear residue   Swallowing Recommendations   Diet Consistency Recommendations regular diet;thin liquids (level 0)   Supervision Level for Intake close supervision needed   Comment, Swallowing Recommendations NO STRAW, sit at 90 degrees, stay up for 30 minutes, small single sips, alternate textures, extra dry swallows, check for oral residue, pick soft food; thicken liquids to slightly thick if increased coughing after thin liquids despite safe swallow strategies; hold po if decreased respiratory status   Clinical Impression   Criteria for Skilled Therapeutic Interventions Met (SLP Eval) Yes, treatment indicated   SLP Diagnosis Mild-moderate oral-pharyngeal dysphagia   Risks & Benefits of therapy have been explained evaluation/treatment results reviewed;care plan/treatment goals reviewed;risks/benefits reviewed;current/potential barriers  reviewed;participants voiced agreement with care plan;participants included;patient   Clinical Impression Comments Mild-moderate oral-pharyngeal dysphagia was found on today's study.  Deficits include delayed swallows, slight decreased epiglottic closure, mild decreased peristalsis and base of tongue function, and tight UES.  Deficits resulted in mild/mild-moderate pharyngeal residue, flash penetration of thin by cup, and increased penetration with residual by straw.  Recommend a continued regular diet, selection of soft food, and thin liquids with NO STRAW, single small sips, small bites, extra swallows, alternate textures, meds with puree as needed; thicken liquids to slightly thick if increased coughing after thin liquids despite safe swallow strategies.  Plan to continue Tx to assess diet tolerance and train strategies.   SLP Total Evaluation Time   Evaluation, videofluoroscopic eval of swallow function Minutes (30022) 15   Swallowing Intervention   Treatment of Swallowing Dysfunction &/or Oral Function for Feeding Minutes (84987) 10   Symptoms Noted During/After Treatment None   Treatment Detail/Skilled Intervention Educated pt re current deficits and recommended safe swallow strategies. Pt verbalized understanding; however, decreased awareness of bolus size noted in Tx.  Pt took large sips despite repeated min cues for small sips by cup.  Overt coughing noted after 1/2 sips when pt swishing large amount in his mouth first.  Mod cues given for small sips, and pt tolerated 2/2 sips without aspiration signs. RN education provided.   SLP Discharge Planning   SLP Plan assess diet tolerance and train strategies   SLP Discharge Recommendation Transitional Care Facility   SLP Rationale for DC Rec Swallow function may be below baseline; short term swallow Tx after discharge if goal has not been met   SLP Brief overview of current status  Mild-moderate oral-pharyngeal dysphagia; Rec -  regular diet, selection of soft  food, and thin liquids with NO STRAW, single small sips, small bites, extra swallows, alternate textures, meds with puree as needed; thicken liquids to slightly thick if increased coughing after thin liquids despite safe swallow strategies.   SLP Time and Intention   Total Session Time (sum of timed and untimed services) 25

## 2025-05-14 NOTE — PROGRESS NOTES
[facility]    Vascular Neurology Progress Note    Interval History     No changes overnight. Stable exam today.       Hospital Course     Chief complaint: No chief complaint on file.    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     He had initially presented 4/16/25 with generalized weakness/fatigue. Found to have a large R temporal mass with associated hemorrhage and vasogenic edema. He was discharged 4/18 to spend time with family and returned 4/22 for planned R frontotemporal craniotomy with neoplasm excision. Post-op course complicated by cardiopulmonary arrest and seizures. He was discharged to ARU 5/4/25 (He had not resumed Eliquis yet).     He returned 5/12 due to increasing somnolence at ARU since 5/9. Found to have new R IPH. Platelets 49K. Neurosurgery consulted and not recommending surgery or steroids. Oncology/Radiation oncology consulted.     Assessment and Plan     #          R temporal hemorrhagic mass s/p R frontotemporal craniotomy with neoplasm partial excision  4/22/25, now with worsening bleeding in setting of coagulopathy with pancytopenia (plt 49 K on presentation)  -Neuro checks and vitals every 4  hours okay from vascular neurology perspective  -appreciate neurosurgery recommendations  -appreciate heme/onc/radiation oncology recommendations. Stereotactic brain treatments tentatively to start 5/19/25   -Labs: monitor coags, appreciate intensivist and oncology management  -Hold all antiplatelets/anticoagulants/NSAIDs, and pharmacologic VTE prophylaxis  -Blood glucose monitoring, euglycemia, BG <180 while inpatient  -PT/OT as tolerated  -appreciate SPT recommendations, VFSS today  -Elevate head of bed 30  "degrees  -Systolic blood pressure target 130-150, if drops lower than 130 spontaneously that is okay  -Seizure precautions  -continue Keppra 1.5 g every 12 hours, oncology hoping to decrease this dose, would hold off on weaning with acute bleed, recommend waiting a couple weeks  -ECG/troponins x 3, telemetry   -Euthermia, eunatremia   -Stroke Education    DVT Prophylaxis: SCDs, pharmacologic VTE ppx only when cleared by neurosurgery    Patient Follow-up    - in the next 1 week(s) with PCP  -follow-up as recommended at last discharge with general neurology     No further stroke evaluation is recommended, so we will sign off. Please contact us with any additional questions.      Fatou Brantley PA-C  Vascular Neurology    To page me or covering stroke neurology team member, click here: [facility]  Choose \"On Call\" tab at top, then select \"NEUROLOGY/ALL SITES\" from middle drop-down box, press Enter, then look for \"stroke\" or \"telestroke\" for your site.    Physical Examination     Temp: 98  F (36.7  C) Temp src: Oral BP: 125/57 Pulse: 53   Resp: 17 SpO2: 99 % O2 Device: None (Room air)      General Exam  General:  patient lying in bed without any acute distress    HEENT:  normocephalic/atraumatic  Pulmonary:  no respiratory distress on RA     Neuro Exam  Mental Status:  alert, oriented  to self, year, not the month (says June), follows all commands, speech mildly dysarthric but fluent, naming and repetition intact  Cranial Nerves:  visual fields intact, PERRL, EOMI with normal smooth pursuit, facial sensation intact and symmetric, hearing not formally tested but intact to conversation, tongue protrusion midline, R facial droop  Motor:  drift to LUE not hitting bed, strength 4/5, RUE 5/5 no drift, LLE 4/5 no drift, RLE 5/5 no drift  Reflexes:  toes down-going  Sensory:  light touch sensation intact and symmetric throughout upper and lower extremities, no extinction on double simultaneous stimulation   Coordination:  normal " finger-to-nose and heel-to-shin bilaterally without dysmetria  Station/Gait:  deferred    Stroke Scales       NIHSS  1a. Level of Consciousness 0-->Alert, keenly responsive   1b. LOC Questions 0-->Answers both questions correctly   1c. LOC Commands 0-->Performs both tasks correctly   2.   Best Gaze 0-->Normal   3.   Visual 0-->No visual loss   4.   Facial Palsy 2-->Partial paralysis (total or near-total paralysis of lower face)   5a. Motor Arm, Left 1-->Drift, limb holds 90 (or 45) degrees, but drifts down before full 10 seconds, does not hit bed or other support   5b. Motor Arm, Right 0-->No drift, limb holds 90 (or 45) degrees for full 10 secs   6a. Motor Leg, Left 0-->No drift, leg holds 30 degree position for full 5 secs   6b. Motor Leg, right 0-->No drift, leg holds 30 degree position for full 5 secs   7.   Limb Ataxia 0-->Absent   8.   Sensory 0-->Normal, no sensory loss   9.   Best Language 0-->No aphasia, normal   10. Dysarthria 1-->Mild-to-moderate dysarthria, patient slurs at least some words and, at worst, can be understood with some difficulty   11. Extinction and Inattention  0-->No abnormality   Total 4 (05/14/25 1435)       ICH Score (at arrival)  Scoring Tool Score   Age >= 80 years 1 point Yes   GCS score  3-4   5-12   13-15   2 point  1 point  0 point GCS 13-15   Hematoma volume, cm 3 >= 30 1 point Yes   Intraventricular extension 1 point Yes   Infratentorial location 1 point No   Total 3       Imaging/Labs   (Bolded imaging and labs new and/or personally reviewed or re-reviewed by me today)    MRI/Head CT [facility] 5/14/25: stable    MRI brain tumor protocol: 3.7 x 4.7 hemorrhagic enhancing lesion R temporal lobe, surrounding vasogenic edema involving most of R temporal lobe and R temporo-occipital region, appear more heterogeneous and less solidly enhancing compared to prior MRI 4/23/25, persistent mass effect with partial effacement R lateral ventricle and third ventricle, no significant change in 5.5  mm Leftward midline shift at lateral ventricles, stable mild medialization of R uncus, 6.7 mm FLAIR hyperintense extra-axial fluid deep to R sided craniotomy (prior 3.4 mm), mild dural thickening and enhancement deep to R sided craniotomy     CTH #2: stable  CTH:#1. Increased size R temporal hemorrhagic mass concerning for acute hemorrhage within mass, new LVH, increased Lward midline shift 5 mm, slight increased thin mized density extra-axial collection underlying the R craniotomy      EKG/Telemetry Wide QRS rhythm   Left ventricular hypertrophy with QRS widening and repolarization abnormality   Cannot rule out Inferior infarct , age undetermined         LDL  No lab value available in past 30 days   A1C  4/23/2025: 7.9 %   Troponin No lab value available in past 48 hrs     Other labs reviewed by me today:  CBC, BMP    Time Spent on this Encounter   Billing: I have personally spent a total of 35 minutes providing care today, time spent in reviewing medical records and devising the plan as recorded above.

## 2025-05-14 NOTE — PROGRESS NOTES
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 Admitted to ICU with hospital medicine as primary.         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 Discharged to rehab 5/4, then with gradual decline starting 5/9, increased weakness and somnolence with CT head with increased R temporal lobe hemorrahgic mass and new left intraventricular hemorrhage, transferred to [facility] ICU.  *Repeat CT stable, discussed with neurosurgery no surgical intervention planned, steroids not needed at this time.   - Neurosurgery consultedand are following   -Neurochecks per neurosurgery  - Transfuse platelets to goal greater than 75K, consented  - SBP goal <150, PRNs ordered  - Continue Keppra 1500 mg twice daily  - Steroids not indicated per neurosurgery  - HOB 30 degrees   - Seizure precautions  - Okay for diet   - PT/OT/SLP consultations  requested  Platelet count stable at 86K    Repeat CT scan states stable bleed.  Transferred out of ICU on 5/13/2025    PT is recommending discharge to TCU     High grade neuroendocrine tumor, large cell type, left parotid gland s/p chemoradiation   Hx of neuroendocrine tumor as above, [facility] (Dr. Butler), previously felt to be in remission.  Evaluated by radiation oncology during previous admission, given limited disease extent had recommended postoperative stereotactic radiation therapy to surgical cavity in 5 fractions, they had planned to repeat imaging 2 to 3 weeks after discharge and initiate treatment about 1 week later.  - Heme/onc consulted and are following    Radiation oncology starting radiation therapy inpatient from 5/19/2025 prior to discharge to TCU     Acute on chronic Thrombocytopenia  Previous admission platelts , chronic previously attributed to chemotherapy. Transfusion goal >75 with active intracranial bleed. Received platelets x2 5/12- recheck level of 76 obtained prior to 2nd transfusion being complete.  - Transfuse PRN to >75, consented  - Monitor  - heme/onc consulted      Atrial fibrillation with slow ventricular response  Previous admission had ongoing asymptomatic bradycardia without evidence of higher degree block, remained in A-fib. Intermittently bradycardic to 50s without mental status changes or hypertension here, less likely Cushing's reflex. EKG appears consistent with afib with slow ventricular response.   - Monitor closely  - Hold apixaban   Heart rate currently stable in the 50s.  Patient is asymptomatic     Moderate oral and pharyngeal dysphagia  Noted on previous admission, evaluated by SLP and nutrition, diet has been advanced to regular with thin liquids   - Diet per speech pathology      Coronary artery disease status post CABG  LVH  Postoperative hypotension, CODE BLUE activated  Of note, had hypotensive episode following extubation in the PACU  to the point that he required code blue and CPR.       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 Creatinine 1.02 on admission.  Creatinine stable at 0.93     Diabetes mellitus, type 2, on long term insulin   HgbA1c 7.9% 4/2025. Discharged with glargine 20 units BID with sliding scale, rehab decreased to 20 units daily. Did have issues with hypoglycemia previous admission.  Restarted Lantus at a lower dose 15 units daily on admission will increase to 20 units  daily on 5/14/2025  Medium dose sliding scale ordered  -      Hyperlipidemia  - Continue PTA atorvastatin, fenofibrate      Hypothyroidism  - Continue PTA levothyroxine              Diet: Moderate Consistent Carb (60 g CHO per Meal) Diet  Room Service    DVT Prophylaxis: Pneumatic Compression Devices  Duran Catheter: Not present  Lines: None     Cardiac Monitoring: ACTIVE order. Indication: Tachyarrhythmias, acute (48 hours)  Code Status: Full Code      Clinically Significant Risk Factors                 # Thrombocytopenia: Lowest platelets = 76 in last 2 days, will monitor for bleeding   # Hypertension: Noted on problem list             # DMII: A1C = 7.9 % (Ref range: <5.7 %) within past 6 months         # Financial/Environmental Concerns: none         Social Drivers of Health    Tobacco Use: Medium Risk (2/17/2025)    Patient History     Smoking Tobacco Use: Former     Smokeless Tobacco Use: Never   Physical Activity: Insufficiently Active (2/9/2025)    Exercise Vital Sign     Days of Exercise per Week: 7 days     Minutes of Exercise per Session: 20 min   Social Connections: Unknown (2/9/2025)    Social Connection and Isolation Panel [NHANES]     Frequency of Social Gatherings with Friends and Family: Three times a week          Disposition Plan     Medically Ready for Discharge: Anticipated in 5+ Days         Mental status is stable.  Radiation oncology planning on radiation therapy inpatient starting 5/19/2025 before discharge to  [facility]    Social work consulted and are following      Arleen Winkler MD  Hospitalist Service  [facility]  Securely message with Kov[employer] (more info)  Text page via [employer] Paging/Directory   ______________________________________________________________________    Interval History     Patient is resting comfortably in bed.  Denies any acute complaints.  Radiation oncology planning on starting radiation therapy on 5/19/2025.  Patient is agreeable to staying in the hospital for starting radiation therapy.  No other acute issues    Physical Exam   Vital Signs: Temp: 98.2  F (36.8  C) Temp src: Oral BP: 124/54 Pulse: 53   Resp: 14 SpO2: 97 % O2 Device: None (Room air)    Weight: 0 lbs 0 oz    General Appearance: Alert awake, resting in bed, not in acute distress  Respiratory: Clear to auscultation bilaterally  Cardiovascular: Normal rate rhythm regular  GI: Soft, nontender, nondistended, bowel sounds positive  Skin: Pallor present  Other: No lower extremity edema    Medical Decision Making       52 MINUTES SPENT BY ME on the date of service doing chart review, history, exam, documentation & further activities per the note.      Data     I have personally reviewed the following data over the past 24 hrs:    1.6 (L)  \   8.6 (L)   / 88 (L)     139 104 10.8 /  207 (H)   4.5 24 0.93 \     Ferritin:  N/A % Retic:  N/A LDH:  N/A       Imaging results reviewed over the past 24 hrs:   Recent Results (from the past 24 hours)   MR Brain w/o & w Contrast    Addendum: 5/13/2025    Discussed the findings with ROSARIO WILLSON at 3:47 PM, 05/13/2025      Narrative    EXAM: MR BRAIN W/O and W CONTRAST  LOCATION: [facility]  DATE: 5/13/2025    INDICATION: tumor protocol for R temp mass IPH  COMPARISON: Head CT 05/12/2025, brain MRI 04/23/2025, 04/16/2025  CONTRAST: 8 mL Gadavist  TECHNIQUE: Routine multiplanar multisequence head MRI without and with intravenous  contrast.    FINDINGS:  INTRACRANIAL CONTENTS: Diffusion-weighted images demonstrate no areas of acute/subacute infarcts. Postsurgical changes of left frontotemporal craniotomy and debulking of right temporal lobe tumor. 6.7 mm FLAIR hyperintense extra-axial fluid deep to the   right-sided craniotomy, previously 3.4 mm on brain MRI 04/23/2025. Mild dural thickening and enhancement deep to the right-sided craniotomy.     3.7 x 4.7 cm hemorrhagic enhancing lesion in the right temporal lobe, measured 2.7 x 3 cm and 04/23/2025 and 4 x 5 cm on 04/16/2025. The surrounding confluent T2 prolongation involving most of the right temporal lobe as well as the right   temporo-occipital region is not significantly changed compared to 04/23/2023. These enhancing lesion does appear more heterogeneous and less solidly enhancing compared to the most recent prior brain MRI 04/23/2025.    There is persistent associated mass effect with partial effacement of the right lateral ventricle and the third ventricle. No significant change in the 5.5 mm midline shift to the left at the level of the lateral ventricles. Stable mild medialization of   the right uncus. No cerebellar tonsillar ectopia Non-suppression of the FLAIR hyperintensity involving the CSF spaces along the basilar cisterns, favor to be on an artifactual basis. Mild diffuse cerebral parenchymal volume loss. Mild periventricular and   scattered foci of deep white matter T2 prolongation involving both cerebral hemispheres.    No new abnormal intracranial enhancement.    SELLA: No abnormality accounting for technique.    OSSEOUS STRUCTURES/SOFT TISSUES: Normal marrow signal. The major intracranial vascular flow voids are maintained.     ORBITS: Prior bilateral cataract surgery. Visualized portions of the orbits are otherwise unremarkable.     SINUSES/MASTOIDS: Mild mucosal thickening of the of the maxillary sinuses and ethmoid air cells. Moderate opacification of the right mastoid  air cells.       Impression    IMPRESSION:  1.  3.7 x 4.7 cm hemorrhagic enhancing lesion in the right temporal lobe, measured 2.7 x 3 cm and 04/23/2025 and 4 x 5 cm on 04/16/2025 when measured in a similar fashion. The surrounding vasogenic edema involving most of the right temporal lobe as well   as the right temporo-occipital region is not significantly changed compared to 04/23/2023. These enhancing lesion does appear more heterogeneous and less solidly enhancing compared to the most recent prior brain MRI 04/23/2025.  2.  There is persistent associated mass effect with partial effacement of the right lateral ventricle and the third ventricle. No significant change in the 5.5 mm midline shift to the left at the level of the lateral ventricles. Stable mild medialization   of the right uncus.  3.  6.7 mm FLAIR hyperintense extra-axial fluid deep to the right-sided craniotomy, previously 3.4 mm on brain MRI 04/23/2025. Mild dural thickening and enhancement deep to the right-sided craniotomy.  4.  Mild diffuse cerebral parenchymal volume loss. Presumed chronic hypertensive/microvascular ischemic white matter changes.     CT Head w/o Contrast    Narrative    EXAM: CT HEAD WITHOUT CONTRAST  LOCATION: [facility]  DATE: 05/14/2025    INDICATION: Follow-up stability scan.  COMPARISON: Brain MRI 05/13/2025. Head CT 05/12/2025.  TECHNIQUE: Routine CT Head without IV contrast. Multiplanar reformats. Dose reduction techniques were used.    FINDINGS:  INTRACRANIAL CONTENTS: Unchanged large hemorrhagic mass centered within the right anterior temporal lobe with surrounding vasogenic edema. Mass effect causing effacement of the right lateral ventricle and mild leftward shift of midline structures by 5   mm, unchanged. Redemonstrated right-sided craniotomy with chronic extra-axial collection beneath the craniotomy defect. Previously noted intraventricular hemorrhage within the left occipital horn is no  longer visible. No new hemorrhage.    VISUALIZED ORBITS/SINUSES/MASTOIDS: No intraorbital abnormality. No paranasal sinus mucosal disease. No middle ear or mastoid effusion.    BONES/SOFT TISSUES: No acute abnormality.      Impression    IMPRESSION:  1.  Unchanged large hemorrhagic mass centered within the right anterior temporal lobe with surrounding vasogenic edema and mass effect.  2.  Interval resolution of intraventricular hemorrhage within the left occipital horn.

## 2025-05-14 NOTE — PLAN OF CARE
Reason for Admission: ICH with Shift/ recent crani on 4/22    Cognitive/Mentation: A/Ox 4  Neuros/CMS: Intact right droop, slight slurred speech  VS: stable, clement.   Tele: A fib SVR.  /GI: Continent.   Pulmonary: LS clear.  Pain: denies.     Drains/Lines: PIV  Skin: pale  Activity: Assist x 1 with GBW.  Diet: MOd Carb with thin liquids. Takes pills whole.     Therapies recs: TCU  Discharge: pending    Aggression Stoplight Tool: green    End of shift summary: Patient had preliminary stuff done for radiation and will start treatments on 5/19, patient having video swallow.Patient was pretty sleepy between cares.

## 2025-05-14 NOTE — PROGRESS NOTES
Patient was simulated with a treatment planning CT for the brain.  Five consecutive daily Stereotactic brain treatments will begin 5/19/25.                         .

## 2025-05-14 NOTE — PROGRESS NOTES
Hematology/Oncology Follow-up Note  [facility]    Date of Admission:  5/12/2025   Reason for Consult: neuroendocrine tumor, thrombocytopenia, ICH  Primary Oncologist: Dr. Butler    ASSESSMENT : Pascual Perea is a 84 year old year old male who presented this hospitalization with increased somnolence at rehab facility. Found to have new intraparenchymal hemorrhages. We were consulted as patient has recently been diagnosed with CNS progression for high grade neuroendocrine cancer.  Neuroendocrine tumor   CNS metastasis   Intracranial hemorrhage  Thrombocytopenia    Anemia       PLAN:  Chronic thrombocytopenia, Keppra could be contributing to lower than baseline results.   Labs thus far are negative for thrombocytopenia etiology   Continue to monitor CBC daily, platelet goal per neurology/neurosurgery teams   Continue to hold anticoagulation for afib   OP radiation SRS planned for 05/19/2025   Follow up imaging and appt with Dr. Butler is scheduled.   Will schedule KOJO appt with labs for thrombocytopenia/ anticoagulation follow up in about 1 week post discharge. Message sent to Dr. Butler's team.       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 Severely stenotic left internal jugular vein secondary to mass effect. No evidence of distant mets   09/12/2024 CT soft tissue neck showing decreased size of mass and lymph nodes   12/23/2024 PET showing complete response    04/16/2025 CT Head Large mass in the right anterior temporal lobe measures 5.3 x 4 x 3.8 cm. Associated hemorrhage within the posterior (cystic) aspect of this mass. Significant surrounding vasogenic edema. Right to left midline shift 6.6 mm.  04/16/2025 MRI Brain  Large mass right temporal lobe with extensive surrounding vasogenic edema, measures 5.3 x 3.8 x 4.3 cm. Hemorrhagic signal within the mass. Right-to-left midline shift 4 to 5 mm.  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 The surrounding vasogenic edema involving most of the right temporal lobe as well as the right temporo-occipital region is not significantly changed compared to 04/23/2023. These enhancing lesion does appear more heterogeneous and less solidly enhancing compared to the most recent prior brain MRI 04/23/2025.  05/13/2025 Radiation oncology consulted for progressive brain mets with bleeding   Treatment History   08/14/2024-10/18/2024 carboplatin and etoposide. Completed 4 cycles   09/09/2024-10/21/2024 radiation to left parotid in 30 fractions   Complete remission on imaging.   04/22/2025  Right fronto-temporal craniotomy, with neoplasm excision  05/19/2025 Planning for 5 fraction stereotactic radiation to brain mets       EXAM:  Subjective  Slight headache during visit.   Denies lightheaded/ dizziness   Denies bleeding, bruising, rashes     Objective  GENERAL/CONSTITUTIONAL: No acute distress.  NEUROLOGIC: Alert, oriented, answers questions appropriately.       Labs Reviewed: CBC, CMP, labs as above   Imaging Reviewed: as above     40 minutes spent on the date of the  encounter doing review of outside records, review of test results, interpretation of tests,  implementing/ creating plan, coordinating care, and documentation     Haleigh POLLACK CNP  Hematology/Oncology  [facility]   Securely message with Renetta

## 2025-05-14 NOTE — PROGRESS NOTES
Admission/Transfer from: ICU  2 RN skin assessment completed. Yes, completed with Joleen AVERY RN  Significant findings include: Scattered bruising, R. Crani site  WOC Nurse Consult Ordered? No

## 2025-05-14 NOTE — PLAN OF CARE
Pt here with a new R. IPH with shift. A&Ox4. Neuros stable with R. Droop, R. Neglect, [place], slightly slurred speech, generalized weakness. VSS on RA, keep SBP <150. Tele A Fib SVR. MOD CHO diet, thin liquids. Takes pills whole. Up with A1 GB/W. Continent. PIV SL. Skin with scattered bruising, R. crani incision TYRELL/WNL. Mild headache managed well with PRN tylenol. Pt scoring green on the Aggression Stop Light Tool. Plan for Radiation/Oncology consult, VSS scheduled for today. Repeat head CT completed this shift, stable. Hem/Onc, CVA Neuro and NSG following. Discharge pending, home with [facility] vs. TCU.

## 2025-05-15 ENCOUNTER — APPOINTMENT (OUTPATIENT)
Dept: SPEECH THERAPY | Facility: CLINIC | Age: 85
End: 2025-05-15
Attending: INTERNAL MEDICINE
Payer: COMMERCIAL

## 2025-05-15 VITALS
SYSTOLIC BLOOD PRESSURE: 127 MMHG | DIASTOLIC BLOOD PRESSURE: 66 MMHG | RESPIRATION RATE: 16 BRPM | HEART RATE: 54 BPM | TEMPERATURE: 98.3 F | OXYGEN SATURATION: 98 %

## 2025-05-15 LAB
ATRIAL RATE - MUSE: 37 BPM
DIASTOLIC BLOOD PRESSURE - MUSE: NORMAL MMHG
ERYTHROCYTE [DISTWIDTH] IN BLOOD BY AUTOMATED COUNT: 15.1 % (ref 10–15)
GLUCOSE BLDC GLUCOMTR-MCNC: 155 MG/DL (ref 70–99)
GLUCOSE BLDC GLUCOMTR-MCNC: 193 MG/DL (ref 70–99)
GLUCOSE BLDC GLUCOMTR-MCNC: 204 MG/DL (ref 70–99)
GLUCOSE BLDC GLUCOMTR-MCNC: 210 MG/DL (ref 70–99)
HCT VFR BLD AUTO: 24.8 % (ref 40–53)
HGB BLD-MCNC: 8.3 G/DL (ref 13.3–17.7)
INTERPRETATION ECG - MUSE: NORMAL
MCH RBC QN AUTO: 29.7 PG (ref 26.5–33)
MCHC RBC AUTO-ENTMCNC: 33.5 G/DL (ref 31.5–36.5)
MCV RBC AUTO: 89 FL (ref 78–100)
P AXIS - MUSE: NORMAL DEGREES
PLATELET # BLD AUTO: 83 10E3/UL (ref 150–450)
PR INTERVAL - MUSE: NORMAL MS
QRS DURATION - MUSE: 128 MS
QT - MUSE: 494 MS
QTC - MUSE: 450 MS
R AXIS - MUSE: 61 DEGREES
RBC # BLD AUTO: 2.79 10E6/UL (ref 4.4–5.9)
SYSTOLIC BLOOD PRESSURE - MUSE: NORMAL MMHG
T AXIS - MUSE: 191 DEGREES
VENTRICULAR RATE- MUSE: 50 BPM
WBC # BLD AUTO: 2.4 10E3/UL (ref 4–11)

## 2025-05-15 PROCEDURE — 258N000003 HC RX IP 258 OP 636: Performed by: INTERNAL MEDICINE

## 2025-05-15 PROCEDURE — 85018 HEMOGLOBIN: CPT | Performed by: RADIOLOGY

## 2025-05-15 PROCEDURE — 250N000011 HC RX IP 250 OP 636: Mod: JZ | Performed by: INTERNAL MEDICINE

## 2025-05-15 PROCEDURE — 99232 SBSQ HOSP IP/OBS MODERATE 35: CPT | Performed by: INTERNAL MEDICINE

## 2025-05-15 PROCEDURE — 120N000013 HC R&B IMCU

## 2025-05-15 PROCEDURE — 99231 SBSQ HOSP IP/OBS SF/LOW 25: CPT

## 2025-05-15 PROCEDURE — 36415 COLL VENOUS BLD VENIPUNCTURE: CPT | Performed by: RADIOLOGY

## 2025-05-15 PROCEDURE — 85041 AUTOMATED RBC COUNT: CPT | Performed by: RADIOLOGY

## 2025-05-15 PROCEDURE — 92526 ORAL FUNCTION THERAPY: CPT | Mod: GN

## 2025-05-15 PROCEDURE — 250N000013 HC RX MED GY IP 250 OP 250 PS 637: Performed by: INTERNAL MEDICINE

## 2025-05-15 RX ADMIN — Medication 1 SPRAY: at 22:25

## 2025-05-15 RX ADMIN — LEVETIRACETAM 1500 MG: 100 INJECTION, SOLUTION INTRAVENOUS at 10:12

## 2025-05-15 RX ADMIN — LEVOTHYROXINE SODIUM 125 MCG: 125 TABLET ORAL at 09:09

## 2025-05-15 RX ADMIN — CARBOXYMETHYLCELLULOSE SODIUM 1 DROP: 5 SOLUTION/ DROPS OPHTHALMIC at 09:09

## 2025-05-15 RX ADMIN — LEVETIRACETAM 1500 MG: 100 INJECTION, SOLUTION INTRAVENOUS at 22:08

## 2025-05-15 RX ADMIN — AZELASTINE HYDROCHLORIDE 2 SPRAY: 137 SPRAY, METERED NASAL at 22:25

## 2025-05-15 RX ADMIN — ACETAMINOPHEN 650 MG: 325 TABLET, FILM COATED ORAL at 11:48

## 2025-05-15 RX ADMIN — FENOFIBRATE 54 MG: 54 TABLET ORAL at 09:09

## 2025-05-15 RX ADMIN — ATORVASTATIN CALCIUM 40 MG: 40 TABLET, FILM COATED ORAL at 09:09

## 2025-05-15 RX ADMIN — AZELASTINE HYDROCHLORIDE 2 SPRAY: 137 SPRAY, METERED NASAL at 09:11

## 2025-05-15 RX ADMIN — CARBOXYMETHYLCELLULOSE SODIUM 1 DROP: 5 SOLUTION/ DROPS OPHTHALMIC at 22:08

## 2025-05-15 ASSESSMENT — ACTIVITIES OF DAILY LIVING (ADL)
ADLS_ACUITY_SCORE: 51
ADLS_ACUITY_SCORE: 53
ADLS_ACUITY_SCORE: 53
CONCENTRATING,_REMEMBERING_OR_MAKING_DECISIONS_DIFFICULTY: YES
ADLS_ACUITY_SCORE: 56
ADLS_ACUITY_SCORE: 50
ADLS_ACUITY_SCORE: 56
ADLS_ACUITY_SCORE: 53
WALKING_OR_CLIMBING_STAIRS_DIFFICULTY: YES
TOILETING_ISSUES: NO
ADLS_ACUITY_SCORE: 56
DOING_ERRANDS_INDEPENDENTLY_DIFFICULTY: YES
WEAR_GLASSES_OR_BLIND: YES
ADLS_ACUITY_SCORE: 56
ADLS_ACUITY_SCORE: 54
ADLS_ACUITY_SCORE: 56
ADLS_ACUITY_SCORE: 50
DRESSING/BATHING_DIFFICULTY: NO
ADLS_ACUITY_SCORE: 54
CHANGE_IN_FUNCTIONAL_STATUS_SINCE_ONSET_OF_CURRENT_ILLNESS/INJURY: YES
ADLS_ACUITY_SCORE: 51
ADLS_ACUITY_SCORE: 56
ADLS_ACUITY_SCORE: 56
ADLS_ACUITY_SCORE: 51
ADLS_ACUITY_SCORE: 53
ADLS_ACUITY_SCORE: 56
ADLS_ACUITY_SCORE: 56
ADLS_ACUITY_SCORE: 53
ADLS_ACUITY_SCORE: 56
ADLS_ACUITY_SCORE: 54
DIFFICULTY_EATING/SWALLOWING: NO

## 2025-05-15 NOTE — PLAN OF CARE
Goal Outcome Evaluation:      Plan of Care Reviewed With: patient    Overall Patient Progress: improvingOverall Patient Progress: improving     Reason for Admission: Increased hemorrhagic mass and intraventricular hemorrhage, encephalopathy    Cognitive/Mentation: A/Ox 4  Neuros/CMS: [place], slightly slurred speech  VS: stable.   Tele: A.Fib CVR.  /GI: Continent. Last BM 5/13/25   Pulmonary: LS clear.  Pain: 4/10 headache pain prn tylenol given.     Drains/Lines: PIV patent intact and SL intermittently infusing keppra  Skin: R crainy incision,scattered bruising  Activity: Assist x one with GBW.  Diet: modcarb with thin liquids. Takes pills whole.     Therapies recs: TCU  Discharge: pending    Aggression Stoplight Tool: green

## 2025-05-15 NOTE — PROGRESS NOTES
Reason for Admission: New R IPH with shift; Recent admission for R Frontotemporal Crani with Resection ()    Cognitive/Mentation: A/Ox 4  Neuros/CMS: Intact ex R droop, [place], slurred speech, generalized weakness though 5/5 on all extremities  VS: Bradycardic @ 50s, otherwise VSS on RA. SBP <150   Tele: A fib SVR with BBB.  /GI: Continent. Uses BR. Last BM .   Pulmonary: LS clear. Denies SOB/  Pain: Denies on shift. No nonverbal signs of pain     Drains/Lines: R PIV SL  Skin: R crani incision CDI TYRELL, scattered bruising  Activity: Assist x 1 with GBW.  Diet: Mod CHO - Regular with thin liquids. Takes pills whole.     Therapies recs: TCU  Discharge: Pending    Aggression Stoplight Tool: Green     [id number]  End of shift summary: Fair sleep between cares. Platelet monitored. Plan on Radiation tx on . Seizure precautions maintained, no seizure activity noted on shift.

## 2025-05-15 NOTE — PROGRESS NOTES
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 Admitted to ICU with hospital medicine as primary.         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 Discharged to rehab 5/4, then with gradual decline starting 5/9, increased weakness and somnolence with CT head with increased R temporal lobe hemorrahgic mass and new left intraventricular hemorrhage, transferred to [facility] ICU.  *Repeat CT stable, discussed with neurosurgery no surgical intervention planned, steroids not needed at this time.   - Neurosurgery consultedand are following   -Neurochecks per neurosurgery  - Transfuse platelets to goal greater than 75K, consented  - SBP goal <150, PRNs ordered  - Continue Keppra 1500 mg twice daily  - Steroids not indicated per neurosurgery  - HOB 30 degrees   - Seizure precautions  - Okay for diet   - PT/OT/SLP consultations  requested  Platelet count stable on recheck at 86K    Repeat CT scan states stable bleed.  Transferred out of ICU on 5/13/2025    PT is recommending discharge to TCU     High grade neuroendocrine tumor, large cell type, left parotid gland s/p chemoradiation   Hx of neuroendocrine tumor as above, follows with [facility] (Dr. Butler), previously felt to be in remission.  Evaluated by radiation oncology during previous admission, given limited disease extent had recommended postoperative stereotactic radiation therapy to surgical cavity in 5 fractions, they had planned to repeat imaging 2 to 3 weeks after discharge and initiate treatment about 1 week later.  - Heme/onc consulted and are following    Radiation oncology starting radiation therapy inpatient from 5/19/2025 for 5days prior to discharge to TCU     Acute on chronic Thrombocytopenia  Previous admission platelts , chronic previously attributed to chemotherapy. Transfusion goal >75 with active intracranial bleed. Received platelets x2 5/12- recheck level of 76 obtained prior to 2nd transfusion being complete.  - Transfuse PRN to >75, consented  - Monitor  - heme/onc consulted      Atrial fibrillation with slow ventricular response  Previous admission had ongoing asymptomatic bradycardia without evidence of higher degree block, remained in A-fib. Intermittently bradycardic to 50s without mental status changes or hypertension here, less likely Cushing's reflex. EKG appears consistent with afib with slow ventricular response.   - Monitor closely  - Hold apixaban   Heart rate currently stable in the 50s.  Patient is asymptomatic     Moderate oral and pharyngeal dysphagia  Noted on previous admission, evaluated by SLP and nutrition, diet has been advanced to regular with thin liquids   - Diet per speech pathology      Coronary artery disease status post CABG  LVH  Postoperative hypotension, CODE BLUE activated  Of note, had hypotensive episode following  extubation in the PACU to the point that he required code blue and CPR.       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 Creatinine 1.02 on admission.  Creatinine stable at 0.93     Diabetes mellitus, type 2, on long term insulin   HgbA1c 7.9% 4/2025. Discharged with glargine 20 units BID with sliding scale, rehab decreased to 20 units daily. Did have issues with hypoglycemia previous admission.  Restarted Lantus at a lower dose 15 units daily on admission will increase to 20 units  daily on 5/14/2025  Medium dose sliding scale ordered  -      Hyperlipidemia  - Continue PTA atorvastatin, fenofibrate      Hypothyroidism  - Continue PTA levothyroxine              Diet: Room Service  Combination Diet Regular Diet; Moderate Consistent Carb (60 g CHO per Meal) Diet; Thin Liquids (level 0) (NO STRAW, sit at 90 degrees, stay up for 30 minutes, small single sips, alternate textures, extra dry swallows, check for oral residue, pick ...    DVT Prophylaxis: Pneumatic Compression Devices  Duran Catheter: Not present  Lines: None     Cardiac Monitoring: ACTIVE order. Indication: Tachyarrhythmias, acute (48 hours)  Code Status: Full Code      Clinically Significant Risk Factors                 # Thrombocytopenia: Lowest platelets = 88 in last 2 days, will monitor for bleeding   # Hypertension: Noted on problem list             # DMII: A1C = 7.9 % (Ref range: <5.7 %) within past 6 months         # Financial/Environmental Concerns: none         Social Drivers of Health    Tobacco Use: Medium Risk (2/17/2025)    Patient History     Smoking Tobacco Use: Former     Smokeless Tobacco Use: Never   Physical Activity: Insufficiently Active (2/9/2025)    Exercise Vital Sign     Days of Exercise per Week: 7 days     Minutes of Exercise per Session: 20 min   Social Connections: Unknown (2/9/2025)    Social Connection and Isolation Panel [NHANES]     Frequency of Social Gatherings with Friends and Family: Three times a  week          Disposition Plan       Medically Ready for Discharge: Anticipated in 5+ Days         Mental status is stable.  Radiation oncology planning on radiation therapy inpatient starting 5/19/2025 for 5days  before discharge to [facility]    Social work consulted and are following      Arleen Winkler MD  Hospitalist Service  [facility]  Securely message with [employer] (more info)  Text page via [employer] Paging/Directory   ______________________________________________________________________    Interval History     Patient is resting comfortably in bed.  Denies any acute complaints.  Mild headache. No N/V.  No other acute issues    Physical Exam   Vital Signs: Temp: 98.5  F (36.9  C) Temp src: Oral BP: 114/57 Pulse: 63   Resp: 18 SpO2: 93 % O2 Device: None (Room air)    Weight: 0 lbs 0 oz    General Appearance: Alert awake, resting in bed, not in acute distress  Respiratory: Clear to auscultation bilaterally  Cardiovascular: Normal rate rhythm regular  GI: Soft, nontender, nondistended, bowel sounds positive  Skin: Pallor present  Other: No lower extremity edema    Medical Decision Making       49 MINUTES SPENT BY ME on the date of service doing chart review, history, exam, documentation & further activities per the note.      Data         Imaging results reviewed over the past 24 hrs:   Recent Results (from the past 24 hours)   XR Video Swallow with SLP or OT    Narrative    EXAM: XR VIDEO SWALLOW WITH SLP OR OT  LOCATION: [facility]  DATE: 5/14/2025    INDICATION: Difficulty swallowing.  COMPARISON: None.    TECHNIQUE: Routine swallow study with speech pathology using multiple barium thicknesses.    RADIATION DOSE: Total Air Kerma 2.2 mGy      Impression    IMPRESSION:   Swallow study with Speech Pathology using multiple barium thicknesses.     Thin: Flash penetration. No aspiration. Residue.    Slightly thick: No penetration or aspiration. Residue.    Semisolid: No  penetration or aspiration. Residue.    Regular: No penetration or aspiration. Residue.

## 2025-05-15 NOTE — PROGRESS NOTES
Neurosurgery Progress     Dx:     Mr. Perea is a pleasant 84 male known to [facility] as status post right fronto-temporal craniotomy with neoplasm excision with Dr. Erickson on 4/22/2025. Per report he was doing well until Friday afternoon when it was reported he had a gradual decline in functioning at ARU requiring additional assistance. Per referring CNP, he was previously requiring modified independent assistance of a cane which progressed to needing walker assistance. Exam worsened to include generaized malaise, lethargy and possible left facial droop and generalized weakness.     Neuro stable.     TODAY'S PLAN:     -Radiation oncology consulted and planning radiation therapy to begin 5/19, appreciate recommendations.  -Activity as tolerated, appreciate therapies.  -Platelet goal >75k.  -Q4 neuro checks.  -SBP <150.     In the event that patient's symptoms worsen or change we would appreciate being contacted. We did discuss signs of a worsening problem that he should seek being evaluated.     We did review the above information with the patient whom agrees with the plan and did verbalize understanding.   ________________________________________________________________     Mr. Perea overall feels well and denies any significant discomfort. Does have a mild headache.     /57 (BP Location: Right arm)   Pulse 63   Temp 98.5  F (36.9  C) (Oral)   Resp 18   SpO2 93%      Pt in bed. He appears comfortable and in no apparent distress, moving all extremities.   CN II-XII intact, alert and appropriate with conversation and following commands.   Right facial droop.  Speech is slurred.   Able to name 3/3 objects.   Finger to nose slow and accurate.   Rapid hand movements intact.   Slight left upper extremity drift.   Bilateral upper and lower extremities 5/5. DTR's wnl. Sensation intact throughout. Normal ROM.   Calves soft and non-tender.     All pertinent labs and updated imaging reviewed in EPIC.      Latest  Reference Range & Units Most Recent   Platelet Count 150 - 450 10e3/uL 88 (L)  5/14/25 08:07   (L): Data is abnormally low    Jose Antonio Sky PA-C   Neurosurgery

## 2025-05-15 NOTE — PLAN OF CARE
Goal Outcome Evaluation:           Overall Patient Progress: improvingOverall Patient Progress: improving             Here with ICH with shift s/p crani for right tumor. Neuros stable. Forgetful. Lethargic at times but wakes easily and answers questions and assessment. LUE/LLE 4/5 weakness. Left facial droop. Tylenol for mild headache. Right head incision healing. Sliding scale insulin for elevated blood glucose. SBP maintained < 150. Tele Afib SVR. Mod CHO diet, small bites, meds whole. Up with assist 1, gait belt, walker to chair. Incontinent x 1. Plan to start radiation 5/19 for 5 days then discharge to TCU. Aggression tool green.

## 2025-05-15 NOTE — PROGRESS NOTES
Hematology/Oncology Follow-up Note  [facility]    Date of Admission:  5/12/2025   Reason for Consult: neuroendocrine tumor, thrombocytopenia, ICH  Primary Oncologist: Dr. Butler    ASSESSMENT : Pascual Perea is a 84 year old year old male who presented this hospitalization with increased somnolence at rehab facility. Found to have new intraparenchymal hemorrhages. We were consulted as patient has recently been diagnosed with CNS progression for high grade neuroendocrine cancer.  Neuroendocrine tumor   CNS metastasis   Intracranial hemorrhage  Thrombocytopenia    Anemia       PLAN:  Labs thus far are negative for thrombocytopenia etiology   Continue to monitor CBC daily, platelet goal per neurology/neurosurgery teams   Anticoagulation from a hem/onc perspective is okay with platelets >50K. Will defer to neuro teams to determine best timeframe to reinitiate.   OP radiation SRS planned for 05/19/2025, per RN patient is now going to be remaining inpatient for treatment.   Follow up imaging and appt with Dr. Butler is scheduled.   We will sign off.       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 Severely stenotic left internal jugular vein secondary to mass effect. No evidence of distant mets   09/12/2024 CT soft tissue neck showing decreased size of mass and lymph nodes   12/23/2024 PET showing complete response   04/16/2025 CT Head Large mass in the right anterior temporal  lobe measures 5.3 x 4 x 3.8 cm. Associated hemorrhage within the posterior (cystic) aspect of this mass. Significant surrounding vasogenic edema. Right to left midline shift 6.6 mm.  04/16/2025 MRI Brain  Large mass right temporal lobe with extensive surrounding vasogenic edema, measures 5.3 x 3.8 x 4.3 cm. Hemorrhagic signal within the mass. Right-to-left midline shift 4 to 5 mm.  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 The surrounding vasogenic edema involving most of the right temporal lobe as well as the right temporo-occipital region is not significantly changed compared to 04/23/2023. These enhancing lesion does appear more heterogeneous and less solidly enhancing compared to the most recent prior brain MRI 04/23/2025.  05/13/2025 Radiation oncology consulted for progressive brain mets with bleeding   Treatment History   08/14/2024-10/18/2024 carboplatin and etoposide. Completed 4 cycles   09/09/2024-10/21/2024 radiation to left parotid in 30 fractions   Complete remission on imaging.   04/22/2025  Right fronto-temporal craniotomy, with neoplasm excision  05/19/2025 Planning for 5 fraction stereotactic radiation to brain mets       EXAM:  RN at bedside during visit. States patient is more lethargic today but was able to follow commands and participate in assessment this morning. Was awake for breakfast.   Subjective  Fatigue     Objective  GENERAL/CONSTITUTIONAL: No acute distress.  NEUROLOGIC: Lethargic       Labs Reviewed: CBC, CMP, labs as above   Imaging Reviewed: as above     30 minutes spent on the date of the encounter doing review of  outside records, review of test results, interpretation of tests,  implementing/ creating plan, coordinating care, and documentation     Haleigh POLLACK CNP  Hematology/Oncology  [facility]   Securely message with [employer]

## 2025-05-16 ENCOUNTER — APPOINTMENT (OUTPATIENT)
Dept: OCCUPATIONAL THERAPY | Facility: CLINIC | Age: 85
DRG: 064 | End: 2025-05-16
Attending: INTERNAL MEDICINE
Payer: COMMERCIAL

## 2025-05-16 ENCOUNTER — APPOINTMENT (OUTPATIENT)
Dept: SPEECH THERAPY | Facility: CLINIC | Age: 85
End: 2025-05-16
Attending: INTERNAL MEDICINE
Payer: COMMERCIAL

## 2025-05-16 LAB
ALBUMIN SERPL ELPH-MCNC: 2.9 G/DL (ref 3.7–5.1)
ALPHA1 GLOB SERPL ELPH-MCNC: 0.4 G/DL (ref 0.2–0.4)
ALPHA2 GLOB SERPL ELPH-MCNC: 0.8 G/DL (ref 0.5–0.9)
B-GLOBULIN SERPL ELPH-MCNC: 0.7 G/DL (ref 0.6–1)
BASOPHILS # BLD AUTO: 0 10E3/UL (ref 0–0.2)
BASOPHILS NFR BLD AUTO: 1 %
EOSINOPHIL # BLD AUTO: 0.1 10E3/UL (ref 0–0.7)
EOSINOPHIL NFR BLD AUTO: 3 %
ERYTHROCYTE [DISTWIDTH] IN BLOOD BY AUTOMATED COUNT: 15.2 % (ref 10–15)
GAMMA GLOB SERPL ELPH-MCNC: 0.5 G/DL (ref 0.7–1.6)
GLUCOSE BLDC GLUCOMTR-MCNC: 152 MG/DL (ref 70–99)
GLUCOSE BLDC GLUCOMTR-MCNC: 160 MG/DL (ref 70–99)
GLUCOSE BLDC GLUCOMTR-MCNC: 184 MG/DL (ref 70–99)
GLUCOSE BLDC GLUCOMTR-MCNC: 253 MG/DL (ref 70–99)
HCT VFR BLD AUTO: 27.2 % (ref 40–53)
HGB BLD-MCNC: 9 G/DL (ref 13.3–17.7)
IMM GRANULOCYTES # BLD: 0 10E3/UL
IMM GRANULOCYTES NFR BLD: 0 %
LOCATION OF TASK: ABNORMAL
LYMPHOCYTES # BLD AUTO: 0.5 10E3/UL (ref 0.8–5.3)
LYMPHOCYTES NFR BLD AUTO: 18 %
M PROTEIN SERPL ELPH-MCNC: 0 G/DL
MCH RBC QN AUTO: 29.6 PG (ref 26.5–33)
MCHC RBC AUTO-ENTMCNC: 33.1 G/DL (ref 31.5–36.5)
MCV RBC AUTO: 90 FL (ref 78–100)
MONOCYTES # BLD AUTO: 0.2 10E3/UL (ref 0–1.3)
MONOCYTES NFR BLD AUTO: 8 %
NEUTROPHILS # BLD AUTO: 1.8 10E3/UL (ref 1.6–8.3)
NEUTROPHILS NFR BLD AUTO: 70 %
NRBC # BLD AUTO: 0 10E3/UL
NRBC BLD AUTO-RTO: 0 /100
PLATELET # BLD AUTO: 75 10E3/UL (ref 150–450)
PROT PATTERN SERPL ELPH-IMP: ABNORMAL
RBC # BLD AUTO: 3.04 10E6/UL (ref 4.4–5.9)
WBC # BLD AUTO: 2.6 10E3/UL (ref 4–11)

## 2025-05-16 PROCEDURE — 36415 COLL VENOUS BLD VENIPUNCTURE: CPT | Performed by: INTERNAL MEDICINE

## 2025-05-16 PROCEDURE — 97535 SELF CARE MNGMENT TRAINING: CPT | Mod: GO

## 2025-05-16 PROCEDURE — 85025 COMPLETE CBC W/AUTO DIFF WBC: CPT | Performed by: INTERNAL MEDICINE

## 2025-05-16 PROCEDURE — 250N000013 HC RX MED GY IP 250 OP 250 PS 637: Performed by: INTERNAL MEDICINE

## 2025-05-16 PROCEDURE — 120N000001 HC R&B MED SURG/OB

## 2025-05-16 PROCEDURE — 99232 SBSQ HOSP IP/OBS MODERATE 35: CPT | Performed by: INTERNAL MEDICINE

## 2025-05-16 PROCEDURE — 92526 ORAL FUNCTION THERAPY: CPT | Mod: GN

## 2025-05-16 PROCEDURE — 258N000003 HC RX IP 258 OP 636: Performed by: INTERNAL MEDICINE

## 2025-05-16 PROCEDURE — 250N000011 HC RX IP 250 OP 636: Mod: JZ | Performed by: INTERNAL MEDICINE

## 2025-05-16 PROCEDURE — 85018 HEMOGLOBIN: CPT | Performed by: INTERNAL MEDICINE

## 2025-05-16 RX ADMIN — CARBOXYMETHYLCELLULOSE SODIUM 1 DROP: 5 SOLUTION/ DROPS OPHTHALMIC at 08:47

## 2025-05-16 RX ADMIN — CARBOXYMETHYLCELLULOSE SODIUM 1 DROP: 5 SOLUTION/ DROPS OPHTHALMIC at 20:37

## 2025-05-16 RX ADMIN — LEVETIRACETAM 1500 MG: 100 INJECTION, SOLUTION INTRAVENOUS at 08:46

## 2025-05-16 RX ADMIN — ACETAMINOPHEN 650 MG: 325 TABLET, FILM COATED ORAL at 21:45

## 2025-05-16 RX ADMIN — LEVOTHYROXINE SODIUM 125 MCG: 125 TABLET ORAL at 08:45

## 2025-05-16 RX ADMIN — AZELASTINE HYDROCHLORIDE 2 SPRAY: 137 SPRAY, METERED NASAL at 21:44

## 2025-05-16 RX ADMIN — FENOFIBRATE 54 MG: 54 TABLET ORAL at 08:45

## 2025-05-16 RX ADMIN — AZELASTINE HYDROCHLORIDE 2 SPRAY: 137 SPRAY, METERED NASAL at 08:48

## 2025-05-16 RX ADMIN — Medication 1 SPRAY: at 21:44

## 2025-05-16 RX ADMIN — LEVETIRACETAM 1500 MG: 100 INJECTION, SOLUTION INTRAVENOUS at 21:35

## 2025-05-16 RX ADMIN — ATORVASTATIN CALCIUM 40 MG: 40 TABLET, FILM COATED ORAL at 08:45

## 2025-05-16 RX ADMIN — ACETAMINOPHEN 650 MG: 325 TABLET, FILM COATED ORAL at 12:13

## 2025-05-16 ASSESSMENT — ACTIVITIES OF DAILY LIVING (ADL)
ADLS_ACUITY_SCORE: 57
ADLS_ACUITY_SCORE: 53
ADLS_ACUITY_SCORE: 57
ADLS_ACUITY_SCORE: 53
ADLS_ACUITY_SCORE: 57
ADLS_ACUITY_SCORE: 57
ADLS_ACUITY_SCORE: 53
ADLS_ACUITY_SCORE: 57
ADLS_ACUITY_SCORE: 57
ADLS_ACUITY_SCORE: 53
ADLS_ACUITY_SCORE: 57
ADLS_ACUITY_SCORE: 53
ADLS_ACUITY_SCORE: 57
ADLS_ACUITY_SCORE: 57

## 2025-05-16 NOTE — PLAN OF CARE
Goal Outcome Evaluation:    Pt here with IPH w/ midline shift, hx crani tumor resection. Alert/lethargic &Ox3-4, d/t time occasionally. Neuros forgetful at times, slight L droop, L side slightly weaker than R, L 4/5. VSS on RA: SBP <150. Tele A-fib CVR. On room service; Reg diet, thin liquids, no straw. Takes pills whole. BG ACHS. Food offered, pt refused lunch and dinner. Up with Ax1 GBW. Continent/incontinent, loose BM x1, Complains of pain 2/10 in head; PRN tylenol given x1. R head incision, WDL. Platelets were 75 this AM, per MD do not transfuse, next platelet draw 5/17. Pt scoring green on the Aggression Stop Light Tool. Plan to start radiation on 5/19 for 5 rounds, than discharge to TCU. Discharge pending radiation completion. Patient had episode with OT where he got weak in R knee coming back from bathroom, neurosurgery notified, continue to monitor.

## 2025-05-16 NOTE — PROGRESS NOTES
Neurosurgery progress note    Patient states he is feeling well today, denies headache.    Exam    Alert, oriented, no acute distress  Moving bilateral upper extremities and lower extremities  Able to independently get up out of bed  Incision clean dry and intact  Slight left facial droop noted  Finger-nose slow and accurate bilaterally  Pupils equal and reactive   extraocular movements intact    Assessment    84 male known to NSGY as status post right fronto-temporal craniotomy with neoplasm excision with Dr. Erickson on 4/22/2025    Readmitted on 5/12/2025 for gradual decline in the ARU, and head CT showing new hemorrhage within the residual tumor, and low platelets at 49K on readmission, 2 units of platelets given at that time.    Plan    Continue holding apixaban for atrial fibrillation until platelets are greater than 100 K.  (Noted hematology indicated they would be okay with 50K platelets)  -Okay for DVT prophylaxis to begin today  -Platelet goal greater than 75K, daily platelet checks  -Radiation oncology consulted and planning radiation therapy to begin 5/19, appreciate recommendations.  -Q4 neuro checks.  -SBP <150    Reviewed with Dr. Erickson

## 2025-05-16 NOTE — PROVIDER NOTIFICATION
MD Notification    Notified Person: MD    Notified Person Name: Ann Marie Morocho     Notification Date/Time: 5/16/25 [id number]    Notification Interaction: Beaker Messaging    Purpose of Notification: Not able to ambulate back to bed from bathroom with OT, decreased in ambulation distance from 5/14.     Orders Received: okay to continue to monitor.    Comments:

## 2025-05-16 NOTE — PROGRESS NOTES
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 Admitted to ICU with hospital medicine as primary.         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 Discharged to rehab 5/4, then with gradual decline starting 5/9, increased weakness and somnolence with CT head with increased R temporal lobe hemorrahgic mass and new left intraventricular hemorrhage, transferred to [facility] ICU.  *Repeat CT stable, discussed with neurosurgery no surgical intervention planned, steroids not needed at this time.   - Neurosurgery consultedand are following   -Neurochecks per neurosurgery  - Transfuse platelets to goal greater than 75K, consented  - SBP goal <150, PRNs ordered  - Continue Keppra 1500 mg twice daily  - Steroids not indicated per neurosurgery  - HOB 30 degrees   - Seizure precautions  - Okay for diet   - PT/OT/SLP consultations  requested  Platelet count stable on recheck at 86K    Repeat CT scan states stable bleed.  Transferred out of ICU on 5/13/2025    PT is recommending discharge to TCU       High grade neuroendocrine tumor, large cell type, left parotid gland s/p chemoradiation   Hx of neuroendocrine tumor as above, [facility] (Dr. Butler), previously felt to be in remission.  Evaluated by radiation oncology during previous admission, given limited disease extent had recommended postoperative stereotactic radiation therapy to surgical cavity in 5 fractions, they had planned to repeat imaging 2 to 3 weeks after discharge and initiate treatment about 1 week later.  - Heme/onc consulted and are following    Radiation oncology starting radiation therapy inpatient from 5/19/2025 for 5days prior to discharge to TCU     Acute on chronic Thrombocytopenia  Previous admission platelts , chronic previously attributed to chemotherapy. Transfusion goal >75 with active intracranial bleed. Received platelets x2 5/12- recheck level of 76 obtained prior to 2nd transfusion being complete.  - Transfuse PRN to >75, consented  - Monitor  - heme/onc consulted      Atrial fibrillation with slow ventricular response  Previous admission had ongoing asymptomatic bradycardia without evidence of higher degree block, remained in A-fib. Intermittently bradycardic to 50s without mental status changes or hypertension here, less likely Cushing's reflex. EKG appears consistent with afib with slow ventricular response.   - Monitor closely  - Hold apixaban   Heart rate currently stable in the 50s.  Patient is asymptomatic     Moderate oral and pharyngeal dysphagia  Noted on previous admission, evaluated by SLP and nutrition, diet has been advanced to regular with thin liquids   - Diet per speech pathology      Coronary artery disease status post CABG  LVH  Postoperative hypotension, CODE BLUE activated  Of note, had hypotensive episode following  extubation in the PACU to the point that he required code blue and CPR.       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 Creatinine 1.02 on admission.  Creatinine stable at 0.93     Diabetes mellitus, type 2, on long term insulin   HgbA1c 7.9% 4/2025. Discharged with glargine 20 units BID with sliding scale, rehab decreased to 20 units daily. Did have issues with hypoglycemia previous admission.  Restarted Lantus at a lower dose 15 units daily on admission will increase to 20 units  daily on 5/14/2025  Medium dose sliding scale ordered  -      Hyperlipidemia  - Continue PTA atorvastatin, fenofibrate      Hypothyroidism  - Continue PTA levothyroxine              Diet: Room Service  Combination Diet Regular Diet; Moderate Consistent Carb (60 g CHO per Meal) Diet; Thin Liquids (level 0) (NO STRAW, sit at 90 degrees, stay up for 30 minutes, small single sips, alternate textures, extra dry swallows, check for oral residue, pick ...    DVT Prophylaxis: Pneumatic Compression Devices and ambulate every shift  Duran Catheter: Not present  Lines: None     Cardiac Monitoring: ACTIVE order. Indication: Tachyarrhythmias, acute (48 hours)  Code Status: Full Code      Clinically Significant Risk Factors                 # Thrombocytopenia: Lowest platelets = 75 in last 2 days, will monitor for bleeding   # Hypertension: Noted on problem list             # DMII: A1C = 7.9 % (Ref range: <5.7 %) within past 6 months         # Financial/Environmental Concerns: none         Social Drivers of Health    Tobacco Use: Medium Risk (2/17/2025)    Patient History     Smoking Tobacco Use: Former     Smokeless Tobacco Use: Never   Physical Activity: Insufficiently Active (2/9/2025)    Exercise Vital Sign     Days of Exercise per Week: 7 days     Minutes of Exercise per Session: 20 min   Social Connections: Unknown (2/9/2025)    Social Connection and Isolation Panel [NHANES]     Frequency of Social Gatherings with Friends and  Family: Three times a week          Disposition Plan       Medically Ready for Discharge: Anticipated in 5+ Days         Mental status is stable.  Radiation oncology planning on radiation therapy inpatient starting 5/19/2025 for 5days  before discharge to [facility]    Social work consulted and are following      Arleen Winkler MD  Hospitalist Service  [facility]  Securely message with [employer] (more info)  Text page via [employer] Paging/Directory   ______________________________________________________________________    Interval History     Patient is resting comfortably in bed.  Denies any acute complaints.  Mild headache. No N/V.  No other acute issues    Physical Exam   Vital Signs: Temp: 98.7  F (37.1  C) Temp src: Axillary BP: 121/45 Pulse: 53   Resp: 16 SpO2: 96 % O2 Device: None (Room air)    Weight: 0 lbs 0 oz    General Appearance: Alert awake, resting in bed, not in acute distress  Respiratory: Clear to auscultation bilaterally  Cardiovascular: Normal rate rhythm regular  GI: Soft, nontender, nondistended, bowel sounds positive  Skin: Pallor present  Other: No lower extremity edema    Medical Decision Making       25 MINUTES SPENT BY ME on the date of service doing chart review, history, exam, documentation & further activities per the note.      Data     I have personally reviewed the following data over the past 24 hrs:    2.6 (L)  \   9.0 (L)   / 75 (L)     N/A N/A N/A /  253 (H)   N/A N/A N/A \       Imaging results reviewed over the past 24 hrs:   No results found for this or any previous visit (from the past 24 hours).

## 2025-05-16 NOTE — PROVIDER NOTIFICATION
MD Notification    Notified Person: MD    Notified Person Name: Arleen Winkler    Notification Date/Time: 2:50pm    Notification Interaction: Vocera    Purpose of Notification: Transfuse for 75 platelets?    Orders Received: Monitor, recheck platelets tomorrow AM.    Comments:

## 2025-05-16 NOTE — PROGRESS NOTES
Care Management Follow Up    Length of Stay (days): 4    Expected Discharge Date: 05/23/2025     Concerns to be Addressed: discharge planning     Patient plan of care discussed at interdisciplinary rounds: Yes    Anticipated Discharge Disposition: TCU recommended  Anticipated Discharge Services: therapies    Referrals Placed by CM/SW: Homecare (resent [facility] referral due to re admission)  Private pay costs discussed: Not applicable    Additional Information:  Patient will begin radiation on 5/19/2025. He will remain here for 5 treatments and will be complete 5/23/2025. Patient has expressed a desire to go to his grandson's wedding on 5/24/2025, TCU is the current recommendation upon discharge.     MARKEL Mike, SW   Social Work   [facility]

## 2025-05-16 NOTE — PLAN OF CARE
Goal Outcome Evaluation:       Reason for Admission: IPH with midline shift    Cognitive/Mentation: A/Ox 3-4, forgetful of time  Neuros/CMS: Intact ex left droop, generalized weakness  VS: BP (!) 140/53 (BP Location: Right arm, Patient Position: Semi-Luna's, Cuff Size: Adult Regular)   Pulse 71   Temp 97.8  F (36.6  C) (Oral)   Resp 16   SpO2 97% .   Tele: Afib CVR.  /GI: Continent.   Pulmonary: LS clear.  Pain: denies.     Drains/Lines: PIV SL  Skin: Scattered bruising  Activity: Assist x 1 with GBW.  Diet: Mod carb with thin liquids. Takes pills whole.     Therapies recs: TCU  Discharge: After 5 courses of radiation beginning the 19th.    Aggression Stoplight Tool: green    End of shift summary: Impulsive a couple times overnight with getting out of bed but redirectable.

## 2025-05-17 LAB
ANION GAP SERPL CALCULATED.3IONS-SCNC: 10 MMOL/L (ref 7–15)
BUN SERPL-MCNC: 10.9 MG/DL (ref 8–23)
CALCIUM SERPL-MCNC: 9.9 MG/DL (ref 8.8–10.4)
CHLORIDE SERPL-SCNC: 103 MMOL/L (ref 98–107)
CREAT SERPL-MCNC: 1.02 MG/DL (ref 0.67–1.17)
EGFRCR SERPLBLD CKD-EPI 2021: 73 ML/MIN/1.73M2
ERYTHROCYTE [DISTWIDTH] IN BLOOD BY AUTOMATED COUNT: 15.3 % (ref 10–15)
GLUCOSE BLDC GLUCOMTR-MCNC: 143 MG/DL (ref 70–99)
GLUCOSE BLDC GLUCOMTR-MCNC: 147 MG/DL (ref 70–99)
GLUCOSE BLDC GLUCOMTR-MCNC: 177 MG/DL (ref 70–99)
GLUCOSE BLDC GLUCOMTR-MCNC: 229 MG/DL (ref 70–99)
GLUCOSE BLDC GLUCOMTR-MCNC: 242 MG/DL (ref 70–99)
GLUCOSE SERPL-MCNC: 163 MG/DL (ref 70–99)
HCO3 SERPL-SCNC: 25 MMOL/L (ref 22–29)
HCT VFR BLD AUTO: 29.1 % (ref 40–53)
HGB BLD-MCNC: 9.9 G/DL (ref 13.3–17.7)
MCH RBC QN AUTO: 29.9 PG (ref 26.5–33)
MCHC RBC AUTO-ENTMCNC: 34 G/DL (ref 31.5–36.5)
MCV RBC AUTO: 88 FL (ref 78–100)
PLATELET # BLD AUTO: 78 10E3/UL (ref 150–450)
POTASSIUM SERPL-SCNC: 4.1 MMOL/L (ref 3.4–5.3)
RBC # BLD AUTO: 3.31 10E6/UL (ref 4.4–5.9)
SODIUM SERPL-SCNC: 138 MMOL/L (ref 135–145)
WBC # BLD AUTO: 2.4 10E3/UL (ref 4–11)

## 2025-05-17 PROCEDURE — 250N000011 HC RX IP 250 OP 636: Performed by: INTERNAL MEDICINE

## 2025-05-17 PROCEDURE — 82374 ASSAY BLOOD CARBON DIOXIDE: CPT | Performed by: INTERNAL MEDICINE

## 2025-05-17 PROCEDURE — 97530 THERAPEUTIC ACTIVITIES: CPT | Mod: GP | Performed by: PHYSICAL THERAPIST

## 2025-05-17 PROCEDURE — 120N000001 HC R&B MED SURG/OB

## 2025-05-17 PROCEDURE — 250N000013 HC RX MED GY IP 250 OP 250 PS 637: Performed by: INTERNAL MEDICINE

## 2025-05-17 PROCEDURE — 97116 GAIT TRAINING THERAPY: CPT | Mod: GP | Performed by: PHYSICAL THERAPIST

## 2025-05-17 PROCEDURE — 36415 COLL VENOUS BLD VENIPUNCTURE: CPT | Performed by: INTERNAL MEDICINE

## 2025-05-17 PROCEDURE — 99232 SBSQ HOSP IP/OBS MODERATE 35: CPT | Performed by: INTERNAL MEDICINE

## 2025-05-17 PROCEDURE — 82947 ASSAY GLUCOSE BLOOD QUANT: CPT | Performed by: INTERNAL MEDICINE

## 2025-05-17 PROCEDURE — 85014 HEMATOCRIT: CPT | Performed by: INTERNAL MEDICINE

## 2025-05-17 RX ORDER — NICOTINE POLACRILEX 4 MG
15-30 LOZENGE BUCCAL
Status: DISCONTINUED | OUTPATIENT
Start: 2025-05-17 | End: 2025-05-17

## 2025-05-17 RX ORDER — HEPARIN SODIUM 5000 [USP'U]/.5ML
5000 INJECTION, SOLUTION INTRAVENOUS; SUBCUTANEOUS EVERY 12 HOURS
Status: DISCONTINUED | OUTPATIENT
Start: 2025-05-17 | End: 2025-05-25 | Stop reason: HOSPADM

## 2025-05-17 RX ORDER — LEVETIRACETAM 500 MG/1
1000 TABLET ORAL 2 TIMES DAILY
Status: DISCONTINUED | OUTPATIENT
Start: 2025-05-17 | End: 2025-05-19

## 2025-05-17 RX ORDER — LISINOPRIL 2.5 MG/1
5 TABLET ORAL DAILY
Status: DISCONTINUED | OUTPATIENT
Start: 2025-05-17 | End: 2025-05-25 | Stop reason: HOSPADM

## 2025-05-17 RX ORDER — DEXTROSE MONOHYDRATE 25 G/50ML
25-50 INJECTION, SOLUTION INTRAVENOUS
Status: DISCONTINUED | OUTPATIENT
Start: 2025-05-17 | End: 2025-05-17

## 2025-05-17 RX ADMIN — LISINOPRIL 5 MG: 2.5 TABLET ORAL at 21:55

## 2025-05-17 RX ADMIN — LEVOTHYROXINE SODIUM 125 MCG: 125 TABLET ORAL at 08:12

## 2025-05-17 RX ADMIN — ATORVASTATIN CALCIUM 40 MG: 40 TABLET, FILM COATED ORAL at 08:12

## 2025-05-17 RX ADMIN — ACETAMINOPHEN 650 MG: 325 TABLET, FILM COATED ORAL at 15:20

## 2025-05-17 RX ADMIN — LEVETIRACETAM 1000 MG: 500 TABLET, FILM COATED ORAL at 20:06

## 2025-05-17 RX ADMIN — AZELASTINE HYDROCHLORIDE 2 SPRAY: 137 SPRAY, METERED NASAL at 08:17

## 2025-05-17 RX ADMIN — CARBOXYMETHYLCELLULOSE SODIUM 1 DROP: 5 SOLUTION/ DROPS OPHTHALMIC at 08:14

## 2025-05-17 RX ADMIN — ACETAMINOPHEN 650 MG: 325 TABLET, FILM COATED ORAL at 08:37

## 2025-05-17 RX ADMIN — CARBOXYMETHYLCELLULOSE SODIUM 1 DROP: 5 SOLUTION/ DROPS OPHTHALMIC at 20:07

## 2025-05-17 RX ADMIN — ACETAMINOPHEN 650 MG: 325 TABLET, FILM COATED ORAL at 20:06

## 2025-05-17 RX ADMIN — HEPARIN SODIUM 5000 UNITS: 5000 INJECTION, SOLUTION INTRAVENOUS; SUBCUTANEOUS at 21:54

## 2025-05-17 RX ADMIN — LEVETIRACETAM 1000 MG: 500 TABLET, FILM COATED ORAL at 08:12

## 2025-05-17 RX ADMIN — FENOFIBRATE 54 MG: 54 TABLET ORAL at 08:12

## 2025-05-17 RX ADMIN — Medication 1 SPRAY: at 22:50

## 2025-05-17 RX ADMIN — INSULIN ASPART 4 UNITS: 100 INJECTION, SOLUTION INTRAVENOUS; SUBCUTANEOUS at 12:02

## 2025-05-17 RX ADMIN — INSULIN ASPART 5 UNITS: 100 INJECTION, SOLUTION INTRAVENOUS; SUBCUTANEOUS at 17:55

## 2025-05-17 RX ADMIN — AZELASTINE HYDROCHLORIDE 2 SPRAY: 137 SPRAY, METERED NASAL at 20:18

## 2025-05-17 ASSESSMENT — ACTIVITIES OF DAILY LIVING (ADL)
ADLS_ACUITY_SCORE: 61
ADLS_ACUITY_SCORE: 57
ADLS_ACUITY_SCORE: 61
ADLS_ACUITY_SCORE: 57
ADLS_ACUITY_SCORE: 61
ADLS_ACUITY_SCORE: 57
ADLS_ACUITY_SCORE: 61

## 2025-05-17 NOTE — PLAN OF CARE
Goal Outcome Evaluation:    Pt here with IPH w/ midline shift, hx crani tumor resection. Alert/lethargic at times &Ox4. Neuros forgetful, slight L droop, L side slightly weaker than R, L 4/5. VSS on RA: SBP <150. Tele A-fib CVR- intermittent bradycardia. On room service; mod carb reg diet, thin liquids, no straw. Takes pills whole. BG ACHS. Up with Ax1 GBW. Continent/incontinent at times. Complains of pain 2-4/10 in head; PRN tylenol given x2. R head incision, WDL. Pt scoring green on the Aggression Stop Light Tool. Platelets 78 today, plan to transfuse for less than 75, next recheck 5/18 AM. Plan to start radiation on 5/19 for 5 rounds, than discharge to TCU. Discharge pending radiation completion.

## 2025-05-17 NOTE — PROGRESS NOTES
Neurosurgery progress note     Patient states he is feeling well today, denies headache.     Platelet count 78k today    Exam     Alert, oriented, no acute distress  Moving bilateral upper extremities and lower extremities  Able to independently get up out of bed  Incision clean dry and intact  Slight left facial droop noted  Finger-nose slow and accurate bilaterally  Pupils equal and reactive   extraocular movements intact     Assessment   84 male known to NSGY as status post right fronto-temporal craniotomy with neoplasm excision with Dr. Erickson on 4/22/2025     Readmitted on 5/12/2025 for gradual decline in the ARU, and head CT showing new hemorrhage within the residual tumor, and low platelets at 49K on readmission, 2 units of platelets given at that time.     Plan   Continue holding apixaban for atrial fibrillation until platelets are greater than 100 K.  (Noted hematology indicated they would be okay with 50K platelets)  -Okay for DVT prophylaxis   -Platelet goal greater than 75K, daily platelet checks  -Radiation oncology consulted and planning radiation therapy to begin 5/19, appreciate recommendations.  -Q4 neuro checks.  -SBP <150     MARCIO Sandoval  [facility] Neurosurgery  [address]  [address] [place] [id number]

## 2025-05-17 NOTE — PROGRESS NOTES
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 Admitted to ICU with hospital medicine as primary.         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 Discharged to rehab 5/4, then with gradual decline starting 5/9, increased weakness and somnolence with CT head with increased R temporal lobe hemorrahgic mass and new left intraventricular hemorrhage, transferred to [facility] ICU.  *Repeat CT stable, discussed with neurosurgery no surgical intervention planned, steroids not needed at this time.   - Neurosurgery consultedand are following   -Neurochecks per neurosurgery  - Transfuse platelets to goal greater than 75K, consented  - SBP goal <150, PRNs ordered  - Keppra dose reduced to 1000mg BID on 5/17/25 due to somnolence and ongoing Thrombocytopenia   - Steroids not indicated per neurosurgery  - HOB 30 degrees   - Seizure precautions  -  Okay for diet   - PT/OT/SLP consultations requested  Platelet count stable on recheck at 86-75-78K     Repeat CT scan states stable bleed.  Transferred out of ICU on 5/13/2025    PT is recommending discharge to TCU    DVT prophylaxis okay to start as per neurosurgery team.  Called and discussed with patient's daughter Ananya about the risk of bleeding if platelet count versus is on subcu heparin.  Patient is also at risk of DVT and PE with his underlying malignancy.  Daughter called other siblings and they all had a family care discussion.  Daughter Ananya called me back saying that they are agreeable with starting subcu heparin.  Start subcu heparin 5000 units twice daily for now if platelets remain stable will plan on increasing to 3 times daily over the next couple of days       High grade neuroendocrine tumor, large cell type, left parotid gland s/p chemoradiation   Hx of neuroendocrine tumor as above, follows [facility] (Dr. Butler), previously felt to be in remission.  Evaluated by radiation oncology during previous admission, given limited disease extent had recommended postoperative stereotactic radiation therapy to surgical cavity in 5 fractions, they had planned to repeat imaging 2 to 3 weeks after discharge and initiate treatment about 1 week later.  - Heme/onc consulted and are following    Radiation oncology starting radiation therapy inpatient from 5/19/2025 for 5days prior to discharge to TCU     Acute on chronic Thrombocytopenia  Previous admission platelts , chronic previously attributed to chemotherapy. Transfusion goal >75 with active intracranial bleed. Received platelets x2 5/12- recheck level of 76 obtained prior to 2nd transfusion being complete.  - Transfuse PRN to >75, consented  - Monitor  - heme/onc consulted.  They signed off currently     Atrial fibrillation with slow ventricular response  Previous admission had ongoing asymptomatic bradycardia without evidence of  higher degree block, remained in A-fib. Intermittently bradycardic to 50s without mental status changes or hypertension here, less likely Cushing's reflex. EKG appears consistent with afib with slow ventricular response.   - Monitor closely  - Hold apixaban until okay with neurosurgery  Heart rate currently stable in the 50s.  Patient is asymptomatic     Moderate oral and pharyngeal dysphagia  Noted on previous admission, evaluated by SLP and nutrition, diet has been advanced to regular with thin liquids   - Diet per speech pathology      Coronary artery disease status post CABG  LVH  Postoperative hypotension, CODE BLUE activated  Of note, had hypotensive episode following extubation in the PACU to the point that he required code blue and CPR.       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 Creatinine 1.02 on admission.  Creatinine stable at 0.93     Diabetes mellitus, type 2, on long term insulin   HgbA1c 7.9% 4/2025. Discharged with glargine 20 units BID with sliding scale, rehab decreased to 20 units daily. Did have issues with hypoglycemia previous admission.  Restarted Lantus at a lower dose 15 units daily on admission will increase to 20 units  daily on 5/14/2025  Medium dose sliding scale ordered increase sliding scale to high dose on 5/17/2025  -      Hyperlipidemia  - Continue PTA atorvastatin, fenofibrate      Hypothyroidism  - Continue PTA levothyroxine              Diet: Room Service  Combination Diet Regular Diet; Moderate Consistent Carb (60 g CHO per Meal) Diet; Thin Liquids (level 0) (NO STRAW, sit at 90 degrees, stay up for 30 minutes, small single sips, alternate textures, extra dry swallows, check for oral residue, pick ...    DVT Prophylaxis: Subcu heparin twice daily started after discussion with family  Duran Catheter: Not present  Lines: None     Cardiac Monitoring: ACTIVE order. Indication: Tachyarrhythmias, acute (48 hours)  Code Status: Full Code      Clinically  Significant Risk Factors                 # Thrombocytopenia: Lowest platelets = 75 in last 2 days, will monitor for bleeding   # Hypertension: Noted on problem list             # DMII: A1C = 7.9 % (Ref range: <5.7 %) within past 6 months         # Financial/Environmental Concerns: none         Social Drivers of Health    Tobacco Use: Medium Risk (2/17/2025)    Patient History     Smoking Tobacco Use: Former     Smokeless Tobacco Use: Never   Physical Activity: Insufficiently Active (2/9/2025)    Exercise Vital Sign     Days of Exercise per Week: 7 days     Minutes of Exercise per Session: 20 min   Social Connections: Unknown (2/9/2025)    Social Connection and Isolation Panel [NHANES]     Frequency of Social Gatherings with Friends and Family: Three times a week          Disposition Plan       Medically Ready for Discharge: Anticipated in 5+ Days         Mental status is stable.  Radiation oncology planning on radiation therapy inpatient starting 5/19/2025 for 5days  before discharge to [facility]    Social work consulted and are following      Arleen Winkler MD  Hospitalist Service  [facility]  Securely message with [employer] (more info)  Text page via [email or web address] Paging/Directory   ______________________________________________________________________    Interval History     Patient is resting comfortably in bed.  Denies any acute complaints.  Mild headache. No N/V.  No other acute issues    Physical Exam   Vital Signs: Temp: 97.9  F (36.6  C) Temp src: Axillary BP: 127/51 Pulse: 55   Resp: 16 SpO2: 98 % O2 Device: None (Room air)    Weight: 0 lbs 0 oz    General Appearance: Alert awake, resting in bed, not in acute distress  Respiratory: Clear to auscultation bilaterally  Cardiovascular: Normal rate rhythm regular  GI: Soft, nontender, nondistended, bowel sounds positive  Skin: Pallor present  Other: No lower extremity edema    Medical Decision Making       35 MINUTES SPENT BY ME on the date of service  doing chart review, history, exam, documentation & further activities per the note.      Data     I have personally reviewed the following data over the past 24 hrs:    2.4 (L)  \   9.9 (L)   / 78 (L)     138 103 10.9 /  229 (H)   4.1 25 1.02 \       Imaging results reviewed over the past 24 hrs:   No results found for this or any previous visit (from the past 24 hours).

## 2025-05-17 NOTE — PLAN OF CARE
"Goal Outcome Evaluation:               Patient Name: Oh  MRN: [id number]  Date of Admission: 5/12/2025        Vitals:   BP Readings from Last 1 Encounters:   05/16/25 134/60     Pulse Readings from Last 1 Encounters:   05/16/25 54     Wt Readings from Last 1 Encounters:   05/07/25 80 kg (176 lb 5.9 oz)     Ht Readings from Last 1 Encounters:   05/04/25 1.803 m (5' 11\")     Estimated body mass index is 24.6 kg/m  as calculated from the following:    Height as of 5/4/25: 1.803 m (5' 11\").    Weight as of 5/7/25: 80 kg (176 lb 5.9 oz).  Temp Readings from Last 1 Encounters:   05/16/25 98.6  F (37  C) (Oral)         Assessment    Resp: LS clear  Telemetry: SB  Neuro: A&Ox4, left face is slightly drooped and speech  is a lit slurred.  GI/: BS active, x0 B  Skin/Wounds: pale with some scatted scattered bruising.  Lines/Drains: PIV SL  Activity: assist x1-2  Sleep: intermittent, in between care,  Aggression Stop Light: Green     Patient Care Plan: Care pan ongoing, call light within reach.    "

## 2025-05-18 ENCOUNTER — APPOINTMENT (OUTPATIENT)
Dept: GENERAL RADIOLOGY | Facility: CLINIC | Age: 85
DRG: 064 | End: 2025-05-18
Attending: INTERNAL MEDICINE
Payer: COMMERCIAL

## 2025-05-18 LAB
ALBUMIN UR-MCNC: NEGATIVE MG/DL
APPEARANCE UR: CLEAR
BILIRUB UR QL STRIP: NEGATIVE
COLOR UR AUTO: ABNORMAL
ERYTHROCYTE [DISTWIDTH] IN BLOOD BY AUTOMATED COUNT: 15.2 % (ref 10–15)
GLUCOSE BLDC GLUCOMTR-MCNC: 157 MG/DL (ref 70–99)
GLUCOSE BLDC GLUCOMTR-MCNC: 171 MG/DL (ref 70–99)
GLUCOSE BLDC GLUCOMTR-MCNC: 186 MG/DL (ref 70–99)
GLUCOSE BLDC GLUCOMTR-MCNC: 200 MG/DL (ref 70–99)
GLUCOSE BLDC GLUCOMTR-MCNC: 227 MG/DL (ref 70–99)
GLUCOSE UR STRIP-MCNC: NEGATIVE MG/DL
HCT VFR BLD AUTO: 28.2 % (ref 40–53)
HGB BLD-MCNC: 9.5 G/DL (ref 13.3–17.7)
HGB UR QL STRIP: NEGATIVE
KETONES UR STRIP-MCNC: NEGATIVE MG/DL
LACTATE SERPL-SCNC: 0.9 MMOL/L (ref 0.7–2)
LEUKOCYTE ESTERASE UR QL STRIP: NEGATIVE
MCH RBC QN AUTO: 29.9 PG (ref 26.5–33)
MCHC RBC AUTO-ENTMCNC: 33.7 G/DL (ref 31.5–36.5)
MCV RBC AUTO: 89 FL (ref 78–100)
MUCOUS THREADS #/AREA URNS LPF: PRESENT /LPF
NITRATE UR QL: NEGATIVE
PH UR STRIP: 6.5 [PH] (ref 5–7)
PLATELET # BLD AUTO: 82 10E3/UL (ref 150–450)
RBC # BLD AUTO: 3.18 10E6/UL (ref 4.4–5.9)
RBC URINE: 1 /HPF
SP GR UR STRIP: 1.02 (ref 1–1.03)
UROBILINOGEN UR STRIP-MCNC: NORMAL MG/DL
WBC # BLD AUTO: 1.9 10E3/UL (ref 4–11)
WBC URINE: <1 /HPF

## 2025-05-18 PROCEDURE — 250N000013 HC RX MED GY IP 250 OP 250 PS 637: Performed by: INTERNAL MEDICINE

## 2025-05-18 PROCEDURE — 99232 SBSQ HOSP IP/OBS MODERATE 35: CPT | Performed by: INTERNAL MEDICINE

## 2025-05-18 PROCEDURE — 71046 X-RAY EXAM CHEST 2 VIEWS: CPT

## 2025-05-18 PROCEDURE — 81003 URINALYSIS AUTO W/O SCOPE: CPT | Performed by: INTERNAL MEDICINE

## 2025-05-18 PROCEDURE — 120N000001 HC R&B MED SURG/OB

## 2025-05-18 PROCEDURE — 36415 COLL VENOUS BLD VENIPUNCTURE: CPT | Performed by: INTERNAL MEDICINE

## 2025-05-18 PROCEDURE — 250N000011 HC RX IP 250 OP 636: Mod: JZ | Performed by: INTERNAL MEDICINE

## 2025-05-18 PROCEDURE — 85027 COMPLETE CBC AUTOMATED: CPT | Performed by: INTERNAL MEDICINE

## 2025-05-18 PROCEDURE — 83605 ASSAY OF LACTIC ACID: CPT | Performed by: INTERNAL MEDICINE

## 2025-05-18 PROCEDURE — 87040 BLOOD CULTURE FOR BACTERIA: CPT | Performed by: INTERNAL MEDICINE

## 2025-05-18 PROCEDURE — 81001 URINALYSIS AUTO W/SCOPE: CPT | Performed by: INTERNAL MEDICINE

## 2025-05-18 RX ORDER — ACETAMINOPHEN 325 MG/1
650 TABLET ORAL EVERY 4 HOURS PRN
Status: DISCONTINUED | OUTPATIENT
Start: 2025-05-18 | End: 2025-05-19

## 2025-05-18 RX ORDER — CEFEPIME HYDROCHLORIDE 2 G/1
2 INJECTION, POWDER, FOR SOLUTION INTRAVENOUS EVERY 8 HOURS
Status: DISCONTINUED | OUTPATIENT
Start: 2025-05-18 | End: 2025-05-20

## 2025-05-18 RX ORDER — ACETAMINOPHEN 325 MG/10.15ML
650 LIQUID ORAL EVERY 4 HOURS PRN
Status: DISCONTINUED | OUTPATIENT
Start: 2025-05-18 | End: 2025-05-25 | Stop reason: HOSPADM

## 2025-05-18 RX ADMIN — LEVETIRACETAM 1000 MG: 500 TABLET, FILM COATED ORAL at 08:19

## 2025-05-18 RX ADMIN — AZELASTINE HYDROCHLORIDE 2 SPRAY: 137 SPRAY, METERED NASAL at 08:18

## 2025-05-18 RX ADMIN — CARBOXYMETHYLCELLULOSE SODIUM 1 DROP: 5 SOLUTION/ DROPS OPHTHALMIC at 20:14

## 2025-05-18 RX ADMIN — HEPARIN SODIUM 5000 UNITS: 5000 INJECTION, SOLUTION INTRAVENOUS; SUBCUTANEOUS at 20:14

## 2025-05-18 RX ADMIN — CARBOXYMETHYLCELLULOSE SODIUM 1 DROP: 5 SOLUTION/ DROPS OPHTHALMIC at 08:19

## 2025-05-18 RX ADMIN — Medication 1 SPRAY: at 21:27

## 2025-05-18 RX ADMIN — LEVOTHYROXINE SODIUM 125 MCG: 125 TABLET ORAL at 08:19

## 2025-05-18 RX ADMIN — INSULIN ASPART 4 UNITS: 100 INJECTION, SOLUTION INTRAVENOUS; SUBCUTANEOUS at 12:35

## 2025-05-18 RX ADMIN — ATORVASTATIN CALCIUM 40 MG: 40 TABLET, FILM COATED ORAL at 08:19

## 2025-05-18 RX ADMIN — LEVETIRACETAM 1000 MG: 500 TABLET, FILM COATED ORAL at 20:13

## 2025-05-18 RX ADMIN — AZELASTINE HYDROCHLORIDE 2 SPRAY: 137 SPRAY, METERED NASAL at 20:21

## 2025-05-18 RX ADMIN — FENOFIBRATE 54 MG: 54 TABLET ORAL at 08:19

## 2025-05-18 RX ADMIN — CEFEPIME 2 G: 2 INJECTION, POWDER, FOR SOLUTION INTRAVENOUS at 17:22

## 2025-05-18 RX ADMIN — LISINOPRIL 5 MG: 2.5 TABLET ORAL at 08:19

## 2025-05-18 RX ADMIN — ACETAMINOPHEN 650 MG: 325 TABLET, FILM COATED ORAL at 17:21

## 2025-05-18 RX ADMIN — INSULIN ASPART 1 UNITS: 100 INJECTION, SOLUTION INTRAVENOUS; SUBCUTANEOUS at 18:07

## 2025-05-18 RX ADMIN — INSULIN ASPART 2 UNITS: 100 INJECTION, SOLUTION INTRAVENOUS; SUBCUTANEOUS at 09:12

## 2025-05-18 RX ADMIN — HEPARIN SODIUM 5000 UNITS: 5000 INJECTION, SOLUTION INTRAVENOUS; SUBCUTANEOUS at 08:24

## 2025-05-18 ASSESSMENT — ACTIVITIES OF DAILY LIVING (ADL)
ADLS_ACUITY_SCORE: 61

## 2025-05-18 NOTE — PROGRESS NOTES
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 Admitted to ICU with hospital medicine as primary.         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 Discharged to rehab 5/4, then with gradual decline starting 5/9, increased weakness and somnolence with CT head with increased R temporal lobe hemorrahgic mass and new left intraventricular hemorrhage, transferred to [facility] ICU.  *Repeat CT stable, discussed with neurosurgery no surgical intervention planned, steroids not needed at this time.   - Neurosurgery consultedand are following   -Neurochecks switched to q8 now   - Transfuse platelets to goal greater than 75K, consented  - SBP goal <150, PRNs ordered  - Keppra dose reduced to 1000mg BID on 5/17/25 due to somnolence and ongoing Thrombocytopenia   - Steroids not indicated per neurosurgery  - HOB 30 degrees   - Seizure  precautions  - Okay for diet   - PT/OT/SLP consultations requested  Platelet count stable on recheck at 86-75-78K -82K    Repeat CT scan states stable bleed.  Transferred out of ICU on 5/13/2025    PT is recommending discharge to TCU    DVT prophylaxis okay to start as per neurosurgery team.  Called and discussed with patient's daughter Ananya about the risk of bleeding if platelet count versus is on subcu heparin.  Patient is also at risk of DVT and PE with his underlying malignancy.  Daughter called other siblings and they all had a family care discussion.  Daughter Ananya called me back saying that they are agreeable with starting subcu heparin. Heparin subQ started        High grade neuroendocrine tumor, large cell type, left parotid gland s/p chemoradiation   Hx of neuroendocrine tumor as above, follows with [facility] (Dr. Butler), previously felt to be in remission.  Evaluated by radiation oncology during previous admission, given limited disease extent had recommended postoperative stereotactic radiation therapy to surgical cavity in 5 fractions, they had planned to repeat imaging 2 to 3 weeks after discharge and initiate treatment about 1 week later.  - Heme/onc consulted and are following    Radiation oncology starting radiation therapy inpatient from 5/19/2025 for 5days prior to discharge to TCU     Acute on chronic Thrombocytopenia  Previous admission platelts , chronic previously attributed to chemotherapy. Transfusion goal >75 with active intracranial bleed. Received platelets x2 5/12- recheck level of 76 obtained prior to 2nd transfusion being complete.  - Transfuse PRN to >75, consented  - Monitor  - heme/onc consulted. Work up for thrombocytopenia was negative so far.  They signed off currently     Atrial fibrillation with slow ventricular response  Previous admission had ongoing asymptomatic bradycardia without evidence of higher degree block, remained in A-fib. Intermittently  bradycardic to 50s without mental status changes or hypertension here, less likely Cushing's reflex. EKG appears consistent with afib with slow ventricular response.   - Monitor closely  - Hold apixaban until okay with neurosurgery  Heart rate currently stable in the 50s.  Patient is asymptomatic     Moderate oral and pharyngeal dysphagia  Noted on previous admission, evaluated by SLP and nutrition, diet has been advanced to regular with thin liquids   - Diet per speech pathology      Coronary artery disease status post CABG  LVH  Postoperative hypotension after recent crani , CODE BLUE activated  HTN   Of note, had hypotensive episode following extubation in the PACU to the point that he required code blue and CPR.    BP in the 140s started on low dose lisinopril 5mg po daily on 5/17/25. 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 Creatinine 1.02 on admission.  Creatinine stable at 0.93     Diabetes mellitus, type 2, on long term insulin   HgbA1c 7.9% 4/2025. Discharged with glargine 20 units BID with sliding scale, rehab decreased to 20 units daily. Did have issues with hypoglycemia previous admission.  Restarted Lantus at a lower dose 15 units daily on admission will increase to 20 units  daily on 5/14/2025  Medium dose sliding scale ordered increase sliding scale to high dose on 5/17/2025  -      Hyperlipidemia  - Continue PTA atorvastatin, fenofibrate      Hypothyroidism  - Continue PTA levothyroxine              Diet: Room Service  Combination Diet Regular Diet; Moderate Consistent Carb (60 g CHO per Meal) Diet; Thin Liquids (level 0) (NO STRAW, sit at 90 degrees, stay up for 30 minutes, small single sips, alternate textures, extra dry swallows, check for oral residue, pick ...    DVT Prophylaxis: Subcu heparin  started after discussion with family  Duran Catheter: Not present  Lines: None     Cardiac Monitoring: ACTIVE order. Indication: Tachyarrhythmias, acute  (48 hours)  Code Status: Full Code      Clinically Significant Risk Factors                 # Thrombocytopenia: Lowest platelets = 78 in last 2 days, will monitor for bleeding   # Hypertension: Noted on problem list             # DMII: A1C = 7.9 % (Ref range: <5.7 %) within past 6 months         # Financial/Environmental Concerns: none         Social Drivers of Health    Tobacco Use: Medium Risk (2/17/2025)    Patient History     Smoking Tobacco Use: Former     Smokeless Tobacco Use: Never   Physical Activity: Insufficiently Active (2/9/2025)    Exercise Vital Sign     Days of Exercise per Week: 7 days     Minutes of Exercise per Session: 20 min   Social Connections: Unknown (2/9/2025)    Social Connection and Isolation Panel [NHANES]     Frequency of Social Gatherings with Friends and Family: Three times a week          Disposition Plan       Medically Ready for Discharge: Anticipated in 5+ Days         Mental status is stable.  Radiation oncology planning on radiation therapy inpatient starting 5/19/2025 for 5days  before discharge to U    Daughter Ananya was updated on 5/17/2025    Social work consulted and are following    Pts grandson getting  over the Memorial day weekend.  Family will be available over the phone as per the daughter      Arleen Winkler MD  Hospitalist Service  [facility]  Securely message with Siren[employer] (more info)  Text page via [employer]appCloud Paging/Directory   ______________________________________________________________________    Interval History     Patient is resting comfortably in bed..   Denies any acute complaints.  Mild headache tylenol helping. No N/V.  No other acute issues    Physical Exam   Vital Signs: Temp: 98.6  F (37  C) Temp src: Axillary BP: 126/63 Pulse: 61   Resp: 18 SpO2: 98 % O2 Device: None (Room air)    Weight: 0 lbs 0 oz    General Appearance: Alert awake, resting in bed, not in acute distress  Respiratory: Clear to auscultation  bilaterally  Cardiovascular: Normal rate rhythm regular  GI: Soft, nontender, nondistended, bowel sounds positive  Skin: Pallor present  Other: No lower extremity edema    Medical Decision Making       35 MINUTES SPENT BY ME on the date of service doing chart review, history, exam, documentation & further activities per the note.      Data     I have personally reviewed the following data over the past 24 hrs:    1.9 (L)  \   9.5 (L)   / 82 (L)     N/A N/A N/A /  171 (H)   N/A N/A N/A \       Imaging results reviewed over the past 24 hrs:   No results found for this or any previous visit (from the past 24 hours).

## 2025-05-18 NOTE — PLAN OF CARE
Reason for Admission: IPH w/ midline shift, hx crani tumor resection    Cognitive/Mentation: A/Ox 4. forgetful  Neuros/CMS: Intact ex slight L droop, L side slightly weaker than R, L 4/5  VS: stable on RA. Keep SBP <150.   Tele: SB.  /GI: inContinent. Last BM 5/17.   Pulmonary: LS clear.  Pain: headache 4/10-Tylenol x1.     Drains/Lines: PIV-SL  Skin: R head incision,wdl  Activity: Assist x 1-2 with gbw.  Diet: mod carb reg with thin liquids. Takes pills whole. NO STRAWS    Therapies recs: TCU  Discharge: pending    Aggression Stoplight Tool: green    End of shift summary: Had a brief episode of confusion around 1am, set off bed alarm thinking he was at home and wanted to go upstairs, walked until outside of his door and was argumentative and aggressive when being redirected, reoriented multiple times and finally calmed down-went back to sleep and was oriented the succeeding times he woke up.  Plan to start radiation on 5/19 to complete 5 rounds.

## 2025-05-18 NOTE — PLAN OF CARE
Goal Outcome Evaluation:    Pt here with IPH w/ midline shift, hx crani tumor resection. On neutropenic precautions. Alert/lethargic at times &Ox4. Neuros forgetful, slight L droop, L side slightly weaker than R, L 4/5. VS, on RA, TMAX 101.3: SBP <150. PRN tylenol given for fever. Tele A-fib CVR w/ intermittent bradycardia. On room service; mod carb reg diet, thin liquids, no straw. Takes pills whole. BG ACHS. Up with Ax1 GBW. Continent/incontinent at times. Pink tinged urine in brief, UA ordered. Complains of pain 1/10 in head, declined pain meds. R head incision, WDL. Infectious workup including chest xray, blood cultures and UA competed. Sepsis fired, lactic acid and VS completed. Pt scoring green on the Aggression Stop Light Tool. Platelets 82 today, plan to transfuse for less than 75, next recheck 5/19 AM. Plan to start radiation on 5/19 for 5 rounds, than discharge to TCU. Discharge pending radiation completion.     Updated family with results from infectious workup.

## 2025-05-18 NOTE — PROVIDER NOTIFICATION
MD Notification    Notified Person: MD    Notified Person Name: Arleen Winkler    Notification Date/Time: 5/18/25 4:40pm    Notification Interaction: [employer] Messaging    Purpose of Notification: Temp 101.3    Orders Received: chest xray, blood cultures, cefepime for neutropenic fevers, tylenol available for fever now. Hemoc consult. Neutropenic precautions.    Comments:

## 2025-05-18 NOTE — PROGRESS NOTES
Care Management Follow Up    Length of Stay (days): 6    Expected Discharge Date: 05/24/2025     Concerns to be Addressed: discharge planning     Patient plan of care discussed at interdisciplinary rounds: Yes    Anticipated Discharge Disposition: Home, Home Care  Anticipated Discharge Services: Home Care, Other (see comment) (pending therapy recommendations)  Anticipated Discharge DME: Other (see comment) (pending therapy recommendations)    Patient/family educated on Medicare website which has current facility and service quality ratings:    Education Provided on the Discharge Plan: Yes  Patient/Family in Agreement with the Plan: unable to assess    Referrals Placed by CM/SW: Homecare (resent [facility] referral due to re admission)  Private pay costs discussed: Not applicable    Discussed  Partnership in Safe Discharge Planning  document with patient/family: No     Handoff Completed: No, handoff not indicated or clinically appropriate    Additional Information:  SW reviewed pt status in Morning Rounds not changes : Patient will begin radiation on 5/19/2025. He will remain here for 5 treatments and will be complete 5/23/2025.   As noted pt hopes to get to his grandsons wedding on 5/24/2025, TCU is the current recommendation at discharge.     Next Steps: Continue to follow for discharge planning.     DIONNA FlahertyW

## 2025-05-18 NOTE — PROVIDER NOTIFICATION
MD Notification    Notified Person: MD    Notified Person Name: Arleen Winkler    Notification Date/Time: 5/18/25 1:45pm    Notification Interaction: [employer] messaging    Purpose of Notification: Pink urine in brief.     Orders Received: Likely due to heparin, continue to monitor. UA ordered.    Comments: 4pm vitals w/ fever of 101.3 MD notified. PRN tylenol for fever requested.

## 2025-05-18 NOTE — PROGRESS NOTES
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 Admitted to ICU with hospital medicine as primary.         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 Discharged to rehab 5/4, then with gradual decline starting 5/9, increased weakness and somnolence with CT head with increased R temporal lobe hemorrahgic mass and new left intraventricular hemorrhage, transferred to [facility] ICU.  *Repeat CT stable, discussed with neurosurgery no surgical intervention planned, steroids not needed at this time.   - Neurosurgery consultedand are following   -Neurochecks switched to q8 now   - Transfuse platelets to goal greater than 75K, consented  - SBP goal <150, PRNs ordered  - Keppra dose reduced to 1000mg BID on 5/17/25 due to somnolence and ongoing Thrombocytopenia   - Steroids not indicated per neurosurgery  - HOB 30 degrees   - Seizure  precautions  - Okay for diet   - PT/OT/SLP consultations requested  Platelet count stable on recheck at 86-75-78K -82K    Repeat CT scan states stable bleed.  Transferred out of ICU on 5/13/2025    PT is recommending discharge to TCU    DVT prophylaxis okay to start as per neurosurgery team.  Called and discussed with patient's daughter Ananya about the risk of bleeding if platelet count versus is on subcu heparin.  Patient is also at risk of DVT and PE with his underlying malignancy.  Daughter called other siblings and they all had a family care discussion.  Daughter Ananya called me back saying that they are agreeable with starting subcu heparin. Heparin subQ started     Pancytopenia;  Possible neutropenic fever  Updated by the bedside RN that patient had a fever of 101.3  on 5/18 evening  Pt  is leukopenic with WBC count of 1.9  UA was already ordered for an episode of pink urine with possible hematuria  Chest x-ray stat 2 views ordered, blood cultures x 2 ordered  Started on IV cefepime for possible neutropenic fever on 5/18/2025  Tylenol as needed for fever ordered  CBC with differential ordered for a.m.  Neutropenic precautions ordered  [place] oncology reconsultation requested  Addendum;  UA returned negative with less than 1 WBC, less than 1 RBC, nitrates negative leukocyte esterase negative  Chest x-ray also returned negative for pneumonia     High grade neuroendocrine tumor, large cell type, left parotid gland s/p chemoradiation   Hx of neuroendocrine tumor as above, [facility] previously felt to be in remission.  Evaluated by radiation oncology during previous admission, given limited disease extent had recommended postoperative stereotactic radiation therapy to surgical cavity in 5 fractions, they had planned to repeat imaging 2 to 3 weeks after discharge and initiate treatment about 1 week later.  - Heme/onc consulted and are following    Radiation oncology starting  radiation therapy inpatient from 5/19/2025 for 5days prior to discharge to TCU     Acute on chronic Thrombocytopenia  Previous admission platelts , chronic previously attributed to chemotherapy. Transfusion goal >75 with active intracranial bleed. Received platelets x2 5/12- recheck level of 76 obtained prior to 2nd transfusion being complete.  - Transfuse PRN to >75, consented  - Monitor  - heme/onc consulted. Work up for thrombocytopenia was negative so far.  They signed off currently     Atrial fibrillation with slow ventricular response  Previous admission had ongoing asymptomatic bradycardia without evidence of higher degree block, remained in A-fib. Intermittently bradycardic to 50s without mental status changes or hypertension here, less likely Cushing's reflex. EKG appears consistent with afib with slow ventricular response.   - Monitor closely  - Hold apixaban until okay with neurosurgery  Heart rate currently stable in the 50s.  Patient is asymptomatic     Moderate oral and pharyngeal dysphagia  Noted on previous admission, evaluated by SLP and nutrition, diet has been advanced to regular with thin liquids   - Diet per speech pathology      Coronary artery disease status post CABG  LVH  Postoperative hypotension after recent crani , CODE BLUE activated  HTN   Of note, had hypotensive episode following extubation in the PACU to the point that he required code blue and CPR.    BP in the 140s started on low dose lisinopril 5mg po daily on 5/17/25. 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 Creatinine 1.02 on admission.  Creatinine stable at 0.93     Diabetes mellitus, type 2, on long term insulin   HgbA1c 7.9% 4/2025. Discharged with glargine 20 units BID with sliding scale, rehab decreased to 20 units daily. Did have issues with hypoglycemia previous admission.  Restarted Lantus at a lower dose 15 units daily on admission will increase to 20 units  daily  on 5/14/2025  Medium dose sliding scale ordered increase sliding scale to high dose on 5/17/2025  -      Hyperlipidemia  - Continue PTA atorvastatin, fenofibrate      Hypothyroidism  - Continue PTA levothyroxine              Diet: Room Service  Combination Diet Regular Diet; Moderate Consistent Carb (60 g CHO per Meal) Diet; Thin Liquids (level 0) (NO STRAW, sit at 90 degrees, stay up for 30 minutes, small single sips, alternate textures, extra dry swallows, check for oral residue, pick ...    DVT Prophylaxis: Subcu heparin  started after discussion with family  Duran Catheter: Not present  Lines: None     Cardiac Monitoring: ACTIVE order. Indication: Tachyarrhythmias, acute (48 hours)  Code Status: Full Code      Clinically Significant Risk Factors                 # Thrombocytopenia: Lowest platelets = 78 in last 2 days, will monitor for bleeding   # Hypertension: Noted on problem list             # DMII: A1C = 7.9 % (Ref range: <5.7 %) within past 6 months         # Financial/Environmental Concerns: none         Social Drivers of Health    Tobacco Use: Medium Risk (2/17/2025)    Patient History     Smoking Tobacco Use: Former     Smokeless Tobacco Use: Never   Physical Activity: Insufficiently Active (2/9/2025)    Exercise Vital Sign     Days of Exercise per Week: 7 days     Minutes of Exercise per Session: 20 min   Social Connections: Unknown (2/9/2025)    Social Connection and Isolation Panel [NHANES]     Frequency of Social Gatherings with Friends and Family: Three times a week          Disposition Plan       Medically Ready for Discharge: Anticipated in 5+ Days         Mental status is stable.  Radiation oncology planning on radiation therapy inpatient starting 5/19/2025 for 5days  before discharge to TCU    Daughter Ananya was updated on 5/17/2025    Social work consulted and are following    Pts grandson getting  over the Memorial day weekend.  Family will be available over the phone as per the  daughter      Arleen Winkler MD  Hospitalist Service  [facility]  Securely message with User[employer] (more info)  Text page via [employer] Paging/Directory   ______________________________________________________________________    Interval History     Patient is resting comfortably in bed..   Denies any acute complaints.  Mild headache tylenol helping. No N/V.  No other acute issues    Physical Exam   Vital Signs: Temp: 99.4  F (37.4  C) Temp src: Axillary BP: 121/64 Pulse: 80   Resp: 16 SpO2: 97 % O2 Device: None (Room air)    Weight: 0 lbs 0 oz    General Appearance: Alert awake, resting in bed, not in acute distress  Respiratory: Clear to auscultation bilaterally  Cardiovascular: Normal rate rhythm regular  GI: Soft, nontender, nondistended, bowel sounds positive  Skin: Pallor present  Other: No lower extremity edema    Medical Decision Making       35 MINUTES SPENT BY ME on the date of service doing chart review, history, exam, documentation & further activities per the note.      Data     I have personally reviewed the following data over the past 24 hrs:    1.9 (L)  \   9.5 (L)   / 82 (L)     N/A N/A N/A /  227 (H)   N/A N/A N/A \       Imaging results reviewed over the past 24 hrs:   Recent Results (from the past 24 hours)   XR Chest 2 Views    Narrative    EXAM: XR CHEST 2 VIEWS  LOCATION: [facility]  DATE: 5/18/2025    INDICATION: Fever  COMPARISON: 4/22/2025      Impression    IMPRESSION: Previous sternotomy for coronary bypass. Stable borderline cardiomegaly with normal vascularity. No focal consolidation, pneumothorax nor pleural effusion.

## 2025-05-19 ENCOUNTER — APPOINTMENT (OUTPATIENT)
Dept: SPEECH THERAPY | Facility: CLINIC | Age: 85
DRG: 064 | End: 2025-05-19
Attending: INTERNAL MEDICINE
Payer: COMMERCIAL

## 2025-05-19 ENCOUNTER — APPOINTMENT (OUTPATIENT)
Dept: CT IMAGING | Facility: CLINIC | Age: 85
DRG: 064 | End: 2025-05-19
Attending: PHYSICIAN ASSISTANT
Payer: COMMERCIAL

## 2025-05-19 LAB
ABO + RH BLD: NORMAL
ANION GAP SERPL CALCULATED.3IONS-SCNC: 11 MMOL/L (ref 7–15)
APTT PPP: 28 SECONDS (ref 22–38)
BASOPHILS # BLD AUTO: 0 10E3/UL (ref 0–0.2)
BASOPHILS NFR BLD AUTO: 1 %
BLD GP AB SCN SERPL QL: NEGATIVE
BLD PROD TYP BPU: NORMAL
BLOOD COMPONENT TYPE: NORMAL
BUN SERPL-MCNC: 14.9 MG/DL (ref 8–23)
CALCIUM SERPL-MCNC: 9.6 MG/DL (ref 8.8–10.4)
CHLORIDE SERPL-SCNC: 103 MMOL/L (ref 98–107)
CODING SYSTEM: NORMAL
CREAT SERPL-MCNC: 0.95 MG/DL (ref 0.67–1.17)
EGFRCR SERPLBLD CKD-EPI 2021: 79 ML/MIN/1.73M2
EOSINOPHIL # BLD AUTO: 0 10E3/UL (ref 0–0.7)
EOSINOPHIL NFR BLD AUTO: 2 %
ERYTHROCYTE [DISTWIDTH] IN BLOOD BY AUTOMATED COUNT: 15.5 % (ref 10–15)
GLUCOSE BLDC GLUCOMTR-MCNC: 129 MG/DL (ref 70–99)
GLUCOSE BLDC GLUCOMTR-MCNC: 142 MG/DL (ref 70–99)
GLUCOSE BLDC GLUCOMTR-MCNC: 151 MG/DL (ref 70–99)
GLUCOSE BLDC GLUCOMTR-MCNC: 182 MG/DL (ref 70–99)
GLUCOSE SERPL-MCNC: 208 MG/DL (ref 70–99)
HCO3 SERPL-SCNC: 23 MMOL/L (ref 22–29)
HCT VFR BLD AUTO: 27.9 % (ref 40–53)
HGB BLD-MCNC: 9.4 G/DL (ref 13.3–17.7)
IMM GRANULOCYTES # BLD: 0 10E3/UL
IMM GRANULOCYTES NFR BLD: 1 %
INR PPP: 1.04 (ref 0.85–1.15)
ISSUE DATE AND TIME: NORMAL
LYMPHOCYTES # BLD AUTO: 0.4 10E3/UL (ref 0.8–5.3)
LYMPHOCYTES NFR BLD AUTO: 17 %
MCH RBC QN AUTO: 29.7 PG (ref 26.5–33)
MCHC RBC AUTO-ENTMCNC: 33.7 G/DL (ref 31.5–36.5)
MCV RBC AUTO: 88 FL (ref 78–100)
MCV RBC AUTO: 91 FL (ref 78–100)
MONOCYTES # BLD AUTO: 0.2 10E3/UL (ref 0–1.3)
MONOCYTES NFR BLD AUTO: 7 %
NEUTROPHILS # BLD AUTO: 1.8 10E3/UL (ref 1.6–8.3)
NEUTROPHILS NFR BLD AUTO: 72 %
NRBC # BLD AUTO: 0 10E3/UL
NRBC BLD AUTO-RTO: 0 /100
PLATELET # BLD AUTO: 68 10E3/UL (ref 150–450)
PLATELET # BLD AUTO: 95 10E3/UL (ref 150–450)
POTASSIUM SERPL-SCNC: 4.2 MMOL/L (ref 3.4–5.3)
PROTHROMBIN TIME: 13.4 SECONDS (ref 11.8–14.8)
RBC # BLD AUTO: 3.16 10E6/UL (ref 4.4–5.9)
SODIUM SERPL-SCNC: 137 MMOL/L (ref 135–145)
SPECIMEN EXP DATE BLD: NORMAL
TROPONIN T SERPL HS-MCNC: 40 NG/L
TROPONIN T SERPL HS-MCNC: 41 NG/L
UNIT ABO/RH: NORMAL
UNIT NUMBER: NORMAL
UNIT STATUS: NORMAL
UNIT TYPE ISBT: 6200
WBC # BLD AUTO: 2.4 10E3/UL (ref 4–11)

## 2025-05-19 PROCEDURE — 70450 CT HEAD/BRAIN W/O DYE: CPT | Mod: 76

## 2025-05-19 PROCEDURE — 70450 CT HEAD/BRAIN W/O DYE: CPT

## 2025-05-19 PROCEDURE — 70498 CT ANGIOGRAPHY NECK: CPT

## 2025-05-19 PROCEDURE — 99291 CRITICAL CARE FIRST HOUR: CPT | Performed by: NURSE PRACTITIONER

## 2025-05-19 PROCEDURE — 36415 COLL VENOUS BLD VENIPUNCTURE: CPT | Performed by: PHYSICIAN ASSISTANT

## 2025-05-19 PROCEDURE — 84484 ASSAY OF TROPONIN QUANT: CPT | Performed by: PHYSICIAN ASSISTANT

## 2025-05-19 PROCEDURE — 93005 ELECTROCARDIOGRAM TRACING: CPT

## 2025-05-19 PROCEDURE — 85049 AUTOMATED PLATELET COUNT: CPT | Performed by: STUDENT IN AN ORGANIZED HEALTH CARE EDUCATION/TRAINING PROGRAM

## 2025-05-19 PROCEDURE — 99223 1ST HOSP IP/OBS HIGH 75: CPT | Performed by: INTERNAL MEDICINE

## 2025-05-19 PROCEDURE — 120N000013 HC R&B IMCU

## 2025-05-19 PROCEDURE — 258N000003 HC RX IP 258 OP 636: Performed by: STUDENT IN AN ORGANIZED HEALTH CARE EDUCATION/TRAINING PROGRAM

## 2025-05-19 PROCEDURE — 250N000013 HC RX MED GY IP 250 OP 250 PS 637: Performed by: INTERNAL MEDICINE

## 2025-05-19 PROCEDURE — 250N000011 HC RX IP 250 OP 636: Performed by: PHYSICIAN ASSISTANT

## 2025-05-19 PROCEDURE — P9037 PLATE PHERES LEUKOREDU IRRAD: HCPCS | Performed by: NURSE PRACTITIONER

## 2025-05-19 PROCEDURE — 36415 COLL VENOUS BLD VENIPUNCTURE: CPT | Performed by: INTERNAL MEDICINE

## 2025-05-19 PROCEDURE — 250N000009 HC RX 250: Performed by: PHYSICIAN ASSISTANT

## 2025-05-19 PROCEDURE — 36415 COLL VENOUS BLD VENIPUNCTURE: CPT | Performed by: NURSE PRACTITIONER

## 2025-05-19 PROCEDURE — 0042T CT HEAD PERFUSION W CONTRAST: CPT

## 2025-05-19 PROCEDURE — 120N000001 HC R&B MED SURG/OB

## 2025-05-19 PROCEDURE — 250N000013 HC RX MED GY IP 250 OP 250 PS 637: Performed by: NURSE PRACTITIONER

## 2025-05-19 PROCEDURE — 99233 SBSQ HOSP IP/OBS HIGH 50: CPT | Performed by: STUDENT IN AN ORGANIZED HEALTH CARE EDUCATION/TRAINING PROGRAM

## 2025-05-19 PROCEDURE — 999N000157 HC STATISTIC RCP TIME EA 10 MIN

## 2025-05-19 PROCEDURE — 92526 ORAL FUNCTION THERAPY: CPT | Mod: GN

## 2025-05-19 PROCEDURE — 85730 THROMBOPLASTIN TIME PARTIAL: CPT | Performed by: PHYSICIAN ASSISTANT

## 2025-05-19 PROCEDURE — P9035 PLATELET PHERES LEUKOREDUCED: HCPCS

## 2025-05-19 PROCEDURE — 36415 COLL VENOUS BLD VENIPUNCTURE: CPT | Performed by: STUDENT IN AN ORGANIZED HEALTH CARE EDUCATION/TRAINING PROGRAM

## 2025-05-19 PROCEDURE — 250N000011 HC RX IP 250 OP 636: Mod: JZ | Performed by: INTERNAL MEDICINE

## 2025-05-19 PROCEDURE — 86901 BLOOD TYPING SEROLOGIC RH(D): CPT | Performed by: STUDENT IN AN ORGANIZED HEALTH CARE EDUCATION/TRAINING PROGRAM

## 2025-05-19 PROCEDURE — 85610 PROTHROMBIN TIME: CPT | Performed by: NURSE PRACTITIONER

## 2025-05-19 PROCEDURE — 250N000011 HC RX IP 250 OP 636: Performed by: STUDENT IN AN ORGANIZED HEALTH CARE EDUCATION/TRAINING PROGRAM

## 2025-05-19 PROCEDURE — 99291 CRITICAL CARE FIRST HOUR: CPT | Mod: 24 | Performed by: PHYSICIAN ASSISTANT

## 2025-05-19 PROCEDURE — 82310 ASSAY OF CALCIUM: CPT | Performed by: NURSE PRACTITIONER

## 2025-05-19 PROCEDURE — 85025 COMPLETE CBC W/AUTO DIFF WBC: CPT | Performed by: INTERNAL MEDICINE

## 2025-05-19 RX ORDER — IOPAMIDOL 755 MG/ML
117 INJECTION, SOLUTION INTRAVASCULAR ONCE
Status: COMPLETED | OUTPATIENT
Start: 2025-05-19 | End: 2025-05-19

## 2025-05-19 RX ORDER — SODIUM CHLORIDE, SODIUM LACTATE, POTASSIUM CHLORIDE, CALCIUM CHLORIDE 600; 310; 30; 20 MG/100ML; MG/100ML; MG/100ML; MG/100ML
INJECTION, SOLUTION INTRAVENOUS CONTINUOUS
Status: ACTIVE | OUTPATIENT
Start: 2025-05-19 | End: 2025-05-20

## 2025-05-19 RX ORDER — ACETAMINOPHEN 650 MG/1
650 SUPPOSITORY RECTAL EVERY 4 HOURS PRN
Status: DISCONTINUED | OUTPATIENT
Start: 2025-05-19 | End: 2025-05-25 | Stop reason: HOSPADM

## 2025-05-19 RX ORDER — ACETAMINOPHEN 325 MG/1
650 TABLET ORAL EVERY 4 HOURS PRN
Status: DISCONTINUED | OUTPATIENT
Start: 2025-05-19 | End: 2025-05-25 | Stop reason: HOSPADM

## 2025-05-19 RX ORDER — SODIUM CHLORIDE, SODIUM LACTATE, POTASSIUM CHLORIDE, CALCIUM CHLORIDE 600; 310; 30; 20 MG/100ML; MG/100ML; MG/100ML; MG/100ML
INJECTION, SOLUTION INTRAVENOUS CONTINUOUS
Status: DISCONTINUED | OUTPATIENT
Start: 2025-05-19 | End: 2025-05-19

## 2025-05-19 RX ORDER — LEVETIRACETAM 500 MG/1
1000 TABLET ORAL 2 TIMES DAILY
Status: DISCONTINUED | OUTPATIENT
Start: 2025-05-19 | End: 2025-05-25 | Stop reason: HOSPADM

## 2025-05-19 RX ORDER — LEVETIRACETAM 10 MG/ML
1000 INJECTION INTRAVASCULAR 2 TIMES DAILY
Status: DISCONTINUED | OUTPATIENT
Start: 2025-05-19 | End: 2025-05-25 | Stop reason: HOSPADM

## 2025-05-19 RX ADMIN — Medication 1 SPRAY: at 23:17

## 2025-05-19 RX ADMIN — IOPAMIDOL 117 ML: 755 INJECTION, SOLUTION INTRAVENOUS at 09:36

## 2025-05-19 RX ADMIN — CARBOXYMETHYLCELLULOSE SODIUM 1 DROP: 5 SOLUTION/ DROPS OPHTHALMIC at 21:37

## 2025-05-19 RX ADMIN — ACETAMINOPHEN 650 MG: 325 TABLET, FILM COATED ORAL at 06:26

## 2025-05-19 RX ADMIN — LEVETIRACETAM 1000 MG: 10 INJECTION INTRAVENOUS at 21:37

## 2025-05-19 RX ADMIN — CARBOXYMETHYLCELLULOSE SODIUM 1 DROP: 5 SOLUTION/ DROPS OPHTHALMIC at 13:38

## 2025-05-19 RX ADMIN — SODIUM CHLORIDE, SODIUM LACTATE, POTASSIUM CHLORIDE, AND CALCIUM CHLORIDE: .6; .31; .03; .02 INJECTION, SOLUTION INTRAVENOUS at 17:34

## 2025-05-19 RX ADMIN — CEFEPIME 2 G: 2 INJECTION, POWDER, FOR SOLUTION INTRAVENOUS at 08:54

## 2025-05-19 RX ADMIN — LEVETIRACETAM 1000 MG: 10 INJECTION INTRAVENOUS at 13:32

## 2025-05-19 RX ADMIN — CEFEPIME 2 G: 2 INJECTION, POWDER, FOR SOLUTION INTRAVENOUS at 00:03

## 2025-05-19 RX ADMIN — CEFEPIME 2 G: 2 INJECTION, POWDER, FOR SOLUTION INTRAVENOUS at 23:17

## 2025-05-19 RX ADMIN — SODIUM CHLORIDE 100 ML: 9 INJECTION, SOLUTION INTRAVENOUS at 09:36

## 2025-05-19 RX ADMIN — AZELASTINE HYDROCHLORIDE 2 SPRAY: 137 SPRAY, METERED NASAL at 21:37

## 2025-05-19 RX ADMIN — ACETAMINOPHEN 650 MG: 650 SUPPOSITORY RECTAL at 18:23

## 2025-05-19 RX ADMIN — CEFEPIME 2 G: 2 INJECTION, POWDER, FOR SOLUTION INTRAVENOUS at 16:29

## 2025-05-19 RX ADMIN — AZELASTINE HYDROCHLORIDE 2 SPRAY: 137 SPRAY, METERED NASAL at 13:39

## 2025-05-19 ASSESSMENT — ACTIVITIES OF DAILY LIVING (ADL)
ADLS_ACUITY_SCORE: 63
ADLS_ACUITY_SCORE: 61
ADLS_ACUITY_SCORE: 63
ADLS_ACUITY_SCORE: 66
ADLS_ACUITY_SCORE: 61
ADLS_ACUITY_SCORE: 63
ADLS_ACUITY_SCORE: 61

## 2025-05-19 NOTE — PROGRESS NOTES
Neurosurgery progress note     Patient states he is feeling well today, denies headache.     Platelet count 82k today     Exam  Alert, oriented, no acute distress  Moving extremities well  Incision clean dry and intact  Slight left facial droop noted  PERR       Assessment  84 male known to NSGY as status post right fronto-temporal craniotomy with neoplasm excision with Dr. Erickson on 4/22/2025     Readmitted on 5/12/2025 for gradual decline in the ARU, and head CT showing new hemorrhage within the residual tumor, and low platelets at 49K on readmission, 2 units of platelets given at that time.     Plan   Continue holding apixaban for atrial fibrillation until platelets are greater than 100 K.  (Noted hematology indicated they would be okay with 50K platelets)  -Okay for DVT prophylaxis   -Platelet goal greater than 75K, daily platelet checks  -Radiation oncology consulted and planning radiation therapy to begin 5/19, appreciate recommendations.  -Q4 neuro checks.  -SBP <150     Raeann Bishop MPAS  [facility]  [address]  [address] [place] [id number]

## 2025-05-19 NOTE — PLAN OF CARE
Goal Outcome Evaluation:         Patient lethargic, wakes up to voice. Oriented times 4. Neuro's with slurred speech, Left facial droop, LUE pronator drift, LLE weakness. VSS, except elevated temp this evening. Tele a-fib with SVR. On a pureed diet with mildly thickenedd liquids, patient not interested in eating this evening. Oral cares provided. Intermittently incontinent of urine. Platelets given this morning, patient tolerated well. Completed radiation treatment 1/5 this afternoon. .    RRT called this morning for neuro changes. Repeat CT completed this afternoon this afternoon.

## 2025-05-19 NOTE — PROGRESS NOTES
Hospitalist service cross-cover note:     Paged regarding heart rate intermittently dropping to the high 30s with sleep.  Per review of chart, known atrial fibrillation with SVR.  Not on AV taylor blockers.  If sustained bradycardia noted on monitor, wake patient up and assess for symptoms/heart rate recovery, otherwise continue to monitor.    Jordan Carrasco MD   Hospitalist

## 2025-05-19 NOTE — PROGRESS NOTES
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 Admitted to ICU with hospital medicine as primary.         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 Discharged to rehab 5/4, then with gradual decline starting 5/9, increased weakness and somnolence with CT head with increased R temporal lobe hemorrahgic mass and new left intraventricular hemorrhage, transferred to [facility] ICU.  *5/19 noted to have worsening confusion, garbled speech, and L facial droop. Repeat CTH shows increased size of R temporal region parenchymal hemorrhage with similar surrounding vasogenic edema and mass effect/midline shift and a new acute hemorrhage in the posterior aspect of the right lateral ventricle suspicious for acute choroid plexus hemorrhage or intraparenchymal hemorrhage extension. CTA head/neck negative for large vessel occlusion, high grade  stenosis, or acute dissection.  Neurosurgery following  [facility] status  Neuro checks q1H  Platelet transfusions for goal >75k  SBP goal <150, prns available  Repeat CTH without contrast at ~1400 today  NPO for now given change in speech and worsening of ICH  - Keppra dose reduced to 1000mg BID on 5/17/25 due to somnolence and ongoing Thrombocytopenia   - Hold DVT prophylaxis  - Steroids not indicated per neurosurgery  - HOB 30 degrees   - Seizure precautions  - PT/OT/SLP consultations requested. PT recommending TCU at discharge    Pancytopenia;  Possible neutropenic fever  Updated by the bedside RN that patient had a fever of 101.3  on 5/18 evening  Pt  is leukopenic with WBC count of 1.9  UA was already ordered for an episode of pink urine with possible hematuria  Chest x-ray stat 2 views ordered, blood cultures x 2 ordered  Started on IV cefepime for possible neutropenic fever on 5/18/2025  Tylenol as needed for fever ordered  CBC with differential ordered for a.m.  Neutropenic precautions ordered  [place] oncology reconsultation requested  Addendum;  UA returned negative with less than 1 WBC, less than 1 RBC, nitrates negative leukocyte esterase negative  Chest x-ray also returned negative for pneumonia     High grade neuroendocrine tumor, large cell type, left parotid gland s/p chemoradiation   Hx of neuroendocrine tumor as above, follows with [facility] (Dr. Butler), previously felt to be in remission.  Evaluated by radiation oncology during previous admission, given limited disease extent had recommended postoperative stereotactic radiation therapy to surgical cavity in 5 fractions, they had planned to repeat imaging 2 to 3 weeks after discharge and initiate treatment about 1 week later.  - Heme/onc consulted and are following  - Radiation oncology starting radiation therapy inpatient from 5/19/2025 for 5 days. However, unclear if patient will be able to undergo radiation therapy yet given worsening bleed on  5/19.     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 Transfusion goal >75 with active intracranial bleed. Received platelets x2 5/12- recheck level of 76 obtained prior to 2nd transfusion being complete.  - Transfuse PRN to >75, consented  - Monitor platelets closely  - heme/onc consulted. Work up for thrombocytopenia was negative so far.  They signed off currently     Atrial fibrillation with slow ventricular response  Previous admission had ongoing asymptomatic bradycardia without evidence of higher degree block, remained in A-fib. Intermittently bradycardic to 50s without mental status changes or hypertension here, less likely Cushing's reflex. EKG appears consistent with afib with slow ventricular response.   - Monitor closely  - Hold apixaban until okay with neurosurgery  Heart rate currently stable in the 50s.  Patient is asymptomatic     Moderate oral and pharyngeal dysphagia  Noted on previous admission, evaluated by SLP and nutrition, diet has been advanced to regular with thin liquids   - Diet per speech pathology      Coronary artery disease status post CABG  LVH  Postoperative hypotension after recent crani , CODE BLUE activated  HTN   Of note, had hypotensive episode following extubation in the PACU to the point that he required code blue and CPR.    BP in the 140s started on low dose lisinopril 5mg po daily on 5/17/25. 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 Creatinine stable.     Diabetes mellitus, type 2, on long term insulin   HgbA1c 7.9% 4/2025. Discharged with glargine 20 units BID with sliding scale, rehab decreased to 20 units daily. Did have issues with hypoglycemia previous admission.  Restarted Lantus at a lower dose 15 units daily on admission will increase to 20 units  daily on 5/14/2025  Medium dose sliding scale ordered increase sliding scale to high dose on  5/17/2025     Hyperlipidemia  - Continue PTA atorvastatin, fenofibrate      Hypothyroidism  - Continue PTA levothyroxine              Diet: Room Service  NPO for Medical/Clinical Reasons Except for: No Exceptions    DVT Prophylaxis: hold, SCDs ok  Duran Catheter: Not present  Lines: None     Cardiac Monitoring: ACTIVE order. Indication: AMS  Code Status: Full Code      Clinically Significant Risk Factors                 # Thrombocytopenia: Lowest platelets = 68 in last 2 days, will monitor for bleeding   # Hypertension: Noted on problem list             # DMII: A1C = 7.9 % (Ref range: <5.7 %) within past 6 months         # Financial/Environmental Concerns: none         Social Drivers of Health    Tobacco Use: Medium Risk (2/17/2025)    Patient History     Smoking Tobacco Use: Former     Smokeless Tobacco Use: Never   Physical Activity: Insufficiently Active (2/9/2025)    Exercise Vital Sign     Days of Exercise per Week: 7 days     Minutes of Exercise per Session: 20 min   Social Connections: Unknown (2/9/2025)    Social Connection and Isolation Panel [NHANES]     Frequency of Social Gatherings with Friends and Family: Three times a week          Disposition Plan       Medically Ready for Discharge: Anticipated in 5+ Days pending stabilization of ICH, radiation therapy, TCU placement      Reese Dixon MD  Hospitalist Service  [facility]  Securely message with [employer] (more info)  Text page via [employer] Paging/Directory   ______________________________________________________________________    Interval History   Patient is sleepy but rousable to voice. Easily falls back to sleep. He is cooperative with exam and is able to talk with me. Can be difficult to talk to right now as his speech is garbled (per report, this is worse than before). Has some R eye pain but denies any change in vision including loss of vision, reduced visual field, blurry vision, or any other vision concerns. He also has  a slight headache. Denies N/V/D.      Physical Exam   Vital Signs: Temp: 97.6  F (36.4  C) Temp src: Axillary BP: 131/58 Pulse: 53   Resp: 16 SpO2: 99 % O2 Device: None (Room air)    Weight: 0 lbs 0 oz  Constitutional: Awake, alert, cooperative, no apparent distress.    Pulmonary: No increased work of breathing, good air exchange, clear to auscultation bilaterally, no crackles or wheezing.  Cardiovascular: Regular rate and rhythm, normal S1 and S2, no S3 or S4. No murmurs, rubs, or gallops noted.  GI: Normal bowel sounds, soft, non-distended, non-tender.  No guarding or rebound.  Skin/Integumen: No cyanosis or jaundice. No rashes noted.  Extremities: No lower extremity edema.  Neuro: A&Ox4. Conversant. Responds appropriately in conversation. 5/5 strength in RUE/RLE. 3/5 strength in LUE/LLE. Mild to moderate L facial droop.       Medical Decision Making       51 MINUTES SPENT BY ME on the date of service doing chart review, history, exam, documentation & further activities per the note.      Data     I have personally reviewed the following data over the past 24 hrs:    2.4 (L)  \   9.4 (L)   / 68 (L)     137 103 14.9 /  208 (H)   4.2 23 0.95 \     Trop: 40 (H) BNP: N/A     Procal: N/A CRP: N/A Lactic Acid: 0.9       INR:  1.04 PTT:  28   D-dimer:  N/A Fibrinogen:  N/A       Imaging results reviewed over the past 24 hrs:   Recent Results (from the past 24 hours)   XR Chest 2 Views    Narrative    EXAM: XR CHEST 2 VIEWS  LOCATION: [facility]  DATE: 5/18/2025    INDICATION: Fever  COMPARISON: 4/22/2025      Impression    IMPRESSION: Previous sternotomy for coronary bypass. Stable borderline cardiomegaly with normal vascularity. No focal consolidation, pneumothorax nor pleural effusion.   CT Head w/o Contrast    Narrative    EXAM: CT HEAD W/O CONTRAST, CTA HEAD NECK W CONTRAST  LOCATION: [facility]  DATE: 5/19/2025    INDICATION: Code Stroke, rule out hemorrhage and  evaluate for potential thrombolysis thrombectomy. PLEASE READ IMMEDIATELY.  COMPARISON: 05/14/2025, MRI 05/14/2025  CONTRAST: 67mL Isovue 370  TECHNIQUE: Head and neck CT angiogram with IV contrast. Noncontrast head CT followed by axial helical CT images of the head and neck vessels obtained during the arterial phase of intravenous contrast administration. Axial 2D reconstructed images and   multiplanar 3D MIP reconstructed images of the head and neck vessels were performed by the technologist. Dose reduction techniques were used. All stenosis measurements made according to NASCET criteria unless otherwise specified.    FINDINGS:  NONCONTRAST HEAD CT:  INTRACRANIAL CONTENTS: Right temporal region heterogeneous hyperdense mass measures 5 x 48 x 49 mm, previously 44 x 42 x 41 mm. Similar surrounding vasogenic edema, near complete effacement of the right lateral ventricle and leftward midline shift   measuring 11 mm. No area of transependymal edema. Bilateral hyperdense fluid in the right greater than left posterior lateral ventricles.    VISUALIZED ORBITS/SINUSES/MASTOIDS: No intraorbital abnormality. No paranasal sinus mucosal disease. No middle ear or mastoid effusion.    BONES/SOFT TISSUES: Right-sided craniotomy/cranioplasty changes. No acute abnormality.    HEAD CTA:  ANTERIOR CIRCULATION: No occlusion, aneurysm, or high flow vascular malformation. Standard [place] anatomy.    POSTERIOR CIRCULATION: No occlusion, aneurysm, or high flow vascular malformation. Patent basilar artery.     DURAL VENOUS SINUSES: Not well evaluated on a technical basis.    NECK CTA:  AORTIC ARCH: No high grade origin stenosis.    RIGHT CAROTID: No high grade stenosis or dissection.    LEFT CAROTID: No high grade stenosis or dissection.    VERTEBRAL ARTERIES: No high grade stenosis or dissection.    NONVASCULAR STRUCTURES: No acute finding. Clear lung apices.       Impression    IMPRESSION:   HEAD CT:  1.  Somewhat increased  size of right temporal region parenchymal hemorrhage with similar surrounding vasogenic edema and mass effect/midline shift. No hydrocephalus.  2.  New acute hemorrhage in the posterior aspect of the right lateral ventricle suspicious for acute choroid plexus hemorrhage or intraparenchymal hemorrhage extension. Small focus of hemorrhage in the dependent left lateral ventricle as well.    HEAD CTA:   1.  Negative for large vessel occlusion.    NECK CTA:  1.  Negative for high-grade arterial stenosis or acute dissection.        Findings communicated to Fatou Brantley PA-C at 0957 hours and again at 1002 hours on 05/19/2025.    CTA Head Neck with Contrast    Narrative    EXAM: CT HEAD W/O CONTRAST, CTA HEAD NECK W CONTRAST  LOCATION: [facility]  DATE: 5/19/2025    INDICATION: Code Stroke, rule out hemorrhage and evaluate for potential thrombolysis thrombectomy. PLEASE READ IMMEDIATELY.  COMPARISON: 05/14/2025, MRI 05/14/2025  CONTRAST: 67mL Isovue 370  TECHNIQUE: Head and neck CT angiogram with IV contrast. Noncontrast head CT followed by axial helical CT images of the head and neck vessels obtained during the arterial phase of intravenous contrast administration. Axial 2D reconstructed images and   multiplanar 3D MIP reconstructed images of the head and neck vessels were performed by the technologist. Dose reduction techniques were used. All stenosis measurements made according to NASCET criteria unless otherwise specified.    FINDINGS:  NONCONTRAST HEAD CT:  INTRACRANIAL CONTENTS: Right temporal region heterogeneous hyperdense mass measures 5 x 48 x 49 mm, previously 44 x 42 x 41 mm. Similar surrounding vasogenic edema, near complete effacement of the right lateral ventricle and leftward midline shift   measuring 11 mm. No area of transependymal edema. Bilateral hyperdense fluid in the right greater than left posterior lateral ventricles.    VISUALIZED ORBITS/SINUSES/MASTOIDS: No  intraorbital abnormality. No paranasal sinus mucosal disease. No middle ear or mastoid effusion.    BONES/SOFT TISSUES: Right-sided craniotomy/cranioplasty changes. No acute abnormality.    HEAD CTA:  ANTERIOR CIRCULATION: No occlusion, aneurysm, or high flow vascular malformation. Standard [place] of [place] anatomy.    POSTERIOR CIRCULATION: No occlusion, aneurysm, or high flow vascular malformation. Patent basilar artery.     DURAL VENOUS SINUSES: Not well evaluated on a technical basis.    NECK CTA:  AORTIC ARCH: No high grade origin stenosis.    RIGHT CAROTID: No high grade stenosis or dissection.    LEFT CAROTID: No high grade stenosis or dissection.    VERTEBRAL ARTERIES: No high grade stenosis or dissection.    NONVASCULAR STRUCTURES: No acute finding. Clear lung apices.       Impression    IMPRESSION:   HEAD CT:  1.  Somewhat increased size of right temporal region parenchymal hemorrhage with similar surrounding vasogenic edema and mass effect/midline shift. No hydrocephalus.  2.  New acute hemorrhage in the posterior aspect of the right lateral ventricle suspicious for acute choroid plexus hemorrhage or intraparenchymal hemorrhage extension. Small focus of hemorrhage in the dependent left lateral ventricle as well.    HEAD CTA:   1.  Negative for large vessel occlusion.    NECK CTA:  1.  Negative for high-grade arterial stenosis or acute dissection.        Findings communicated to Fatou Brantley PA-C at 0957 hours and again at 1002 hours on 05/19/2025.    CT Head Perfusion w Contrast    Narrative    EXAM: CT HEAD PERFUSION W CONTRAST  LOCATION: [facility]  DATE: 5/19/2025    INDICATION: Code Stroke to evaluate for potential thrombolysis and thrombectomy. Evaluate mismatch between penumbra and core infarct. PLEASE READ IMMEDIATELY.  COMPARISON: None.  TECHNIQUE: CT cerebral perfusion was performed utilizing a second contrast bolus. Perfusion data were post processed with generation  of standard perfusion maps and estimation of ischemic/infarcted volumes utilizing standard threshold values. Dose   reduction techniques were used. All stenosis measurements made according to NASCET criteria unless otherwise specified.  CONTRAST: 50mL Isovue 370    FINDINGS:   CT PERFUSION:  PERFUSION MAPS: Asymmetric perfusion decrease involving the right temporal region of intraparenchymal hemorrhage. No suspected focal deficits in cerebral blood flow or volume to suggest ischemia/oligemia.    RAPID ANALYSIS: Unreliable secondary to the presence of intracranial hemorrhage. Reported reduction in CBF and elevation in Tmax of 43 mL and 77 mL in the region of hemorrhage not secondary to ischemia.       Impression    IMPRESSION: No core infarct is suspected. Perfusion anomalies as above, secondary to existing intraparenchymal hemorrhage and surrounding vasogenic edema.

## 2025-05-19 NOTE — PROVIDER NOTIFICATION
Patient c/o R eye pain this morning, rated 7/10. 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 Hospitalist and Neurosurgery paged.

## 2025-05-19 NOTE — CODE/RAPID RESPONSE
"    CODE STROKE NOTE  5/19/2025   Time Called: 0905    Pascual Perea is a 84 year old male who was admitted on 5/12/2025 for lethargy. 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 Discharged to ARU 5/4, readmitted w/ lethargy on 5/12. Found to have new intraparenchymal hemorrhages with increased midline shift.  Repeat CT on 5/13/2025 stable, patient transferred to neuro unit.  Heparin subcu initiated on 5/17 at 2100 due to underlying malignancy and risk for DVT/PE.     Nursing notes \"very mild facial droop\" on night shift 5/19, facial droop at 0850 prominent at 0850 and found to have new left hemiparesis and left upper extremity ataxia. Code stroke called by rapid team.    Assessment & Plan     Increased right temporal region parenchymal hemorrhage, 5/19/2025  New, acute hemorrhage in the posterior aspect of the right lateral ventricle, 5/19/2025  Altered mental status  Bradycardia, known afib w/ slow ventricular response  On my arrival, pt is sitting semi upright, eyes closed. Opens eyes to voice, but closes quickly without verbal stim. Left upper extremity 2/5 compared to RUE 5/5. Ataxia noted w/ left upper extremity. Glucose 182, normotensive and afebrile. No hypoxia/resp distress. Noted to be bradycardiac overnight down to 30s while sleeping.    - code stroke called  - add on BMP and INR  - Neuro IMC orders placed    *Emergent neuroimaging showing increased size of the right temporal region parenchymal hemorrhage with similar surrounding vasogenic edema and mass effect/midline shift from previous imaging.  There is a new acute hemorrhage in the posterior aspect of the right " lateral ventricle suspicious for acute choroid plexus hemorrhage or intraparenchymal hemorrhage extension.  There is a small focus of hemorrhage in the dependent left lateral ventricle as well.  Negative for LVO or high-grade arterial stenosis or dissection.    Case discussed with stroke neuro, hospitalist and neurosurgery.  - No surgical intervention planned  - IMC monitoring, every 2 hour neuros  - Repeat imaging in the afternoon 5/19/2025    Acute on chronic thrombocytopenia  Platelets 68k.  Goal 75 at minimum, 100 would be better. Addendum - plts increased to 95k after one unit.  -Platelets ordered    At the conclusion of the RRT, the patient is hemodynamically stable, remains on room air, neurological exam remains stable.  Hospitalist will notify family.    Addendum 1810  Re evaluated patient, hemodynamically stable. Mentation slightly decreased from earlier exam, but able to wake and answer simple yes/no questions. Rectal temp 100.7. Extensive work up 5/18, on broad spectrum ABx. No new clinical findings to suggest additional imaging/labs necessary.   - IA tylenol ordered    [facility]  Securely message with the Voc[employer] Web Console (learn more here)  Text page via [facility] Paging/Directory        Physical Exam   Vital Signs with Ranges:  Temp:  [97.6  F (36.4  C)-101.3  F (38.5  C)] 99  F (37.2  C)  Pulse:  [38-80] 54  Resp:  [12-16] 16  BP: (108-131)/(43-64) 119/61  SpO2:  [95 %-98 %] 95 %  I/O last 3 completed shifts:  In: 840 [P.O.:840]  Out: 790 [Urine:790]    Physical Exam  Vitals and nursing note reviewed.   Constitutional:       General: He is not in acute distress.     Appearance: He is ill-appearing.   Eyes:      Pupils: Pupils are equal, round, and reactive to light.      Comments: pinpoint   Cardiovascular:      Rate and Rhythm: Bradycardia present. Rhythm irregularly irregular.      Heart sounds: No murmur heard.  Pulmonary:      Effort: Pulmonary  effort is normal.      Breath sounds: Normal breath sounds.   Abdominal:      General: Abdomen is flat.      Tenderness: There is no abdominal tenderness.   Musculoskeletal:      Right lower leg: No edema.      Left lower leg: No edema.   Skin:     General: Skin is warm and dry.      Coloration: Skin is pale.   Neurological:      Mental Status: He is lethargic.      GCS: GCS eye subscore is 2. GCS verbal subscore is 3. GCS motor subscore is 6.      Cranial Nerves: Cranial nerve deficit, dysarthria and facial asymmetry present.      Motor: Weakness present.         Code Status: Full Code    Data     IMAGING: (X-ray/CT/MRI)   Recent Results (from the past 24 hours)   XR Chest 2 Views    Narrative    EXAM: XR CHEST 2 VIEWS  LOCATION: [facility]  DATE: 5/18/2025    INDICATION: Fever  COMPARISON: 4/22/2025      Impression    IMPRESSION: Previous sternotomy for coronary bypass. Stable borderline cardiomegaly with normal vascularity. No focal consolidation, pneumothorax nor pleural effusion.       CBC with Diff:  Recent Labs   Lab Test 05/19/25  0859 05/13/25  0321 05/12/25  2107   WBC 2.4*   < > 2.7*   HGB 9.4*   < > 8.7*   MCV 88   < > 93   PLT 68*   < > 76*   INR  --   --  1.14    < > = values in this interval not displayed.      Absolute Retic (10e9/L)   Date Value   07/29/2011 60.8     Absolute Reticulocyte (10e6/uL)   Date Value   06/15/2022 0.127 (H)     % Retic (%)   Date Value   07/29/2011 1.2     % Reticulocyte (%)   Date Value   06/15/2022 3.0 (H)       Comprehensive Metabolic Panel:  Recent Labs   Lab 05/19/25  0839 05/17/25  1142 05/17/25  0843   NA  --   --  138   POTASSIUM  --   --  4.1   CHLORIDE  --   --  103   CO2  --   --  25   ANIONGAP  --   --  10   *   < > 163*   BUN  --   --  10.9   CR  --   --  1.02   GFRESTIMATED  --   --  73   STARR  --   --  9.9    < > = values in this interval not displayed.       Medical Decision Making            I spent 48 minutes of critical  care time on the unit/floor managing the care of  Pascual Perea. Upon evaluation, this patient had a high probability of imminent or life-threatening deterioration due to acute neurological decline which required my direct attention, intervention, and personal management. 100% of my time was spent at the bedside counseling the patient and/or coordinating care regarding services listed in this note.

## 2025-05-19 NOTE — PROGRESS NOTES
[facility]  Neurosurgery Daily Progress Note  Date of Admission:  5/12/2025    Assessment  84M s/p right frontotemporal craniotomy with resection of tumor on 4/22/2025. 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     Patient had gradual decline at ARU, head CT showed hemorrhage within the residual tumor, platelet count was 49K. Patient was admitted at [facility] for further management.     Interval History   Radiation Oncology was planning to start radiation therapy today 5/19.   Code Stroke called today for left facial droop and left sided weakness.     Imaging shows increased size of right temporal region parenchymal hemorrhage, acute hemorrhage in the posterior aspect of the right lateral ventricle suspicious for acute choroid plexus hemorrhage or intraparenchymal hemorrhage extension, small hemorrhage in the left lateral ventricle.     Platelets 68 today     Plan   -No surgical intervention planned at this time  -Neurology ordered keppra and repeat head CT to be completed at 1400   -Neuro checks q 2 hours  -Platelet goal greater than 75k, preferably greater than 100k if able. Platelet transfusion ordered.   -Hold any blood thinners, aspirin, NSAIDS   -Appreciate assistance from specialties     Discussed with Dr. Dre Coon, [facility]  Clinic phone number: [phone]  Securely message or page via Epic Secure Chat, WHObyYOU, or Ensa     Physical Exam   Temp: 99  F (37.2  C) Temp src: Oral BP: 120/61 Pulse: 53   Resp: 16 SpO2: 95 % O2 Device: None (Room air)      Mental status:  Drowsy, awake and alert to voice, oriented x 3, following simple commands, slurred speech.   Cranial nerves:  II-XII intact except slight left facial droop   Motor:    RUE RLE 5/5  LUE LLE 4/5   Sensation:  Intact to light touch   Coordination:  LUE pronator drift

## 2025-05-19 NOTE — PROGRESS NOTES
"Chart reviewed per LOS protocol    Nutrition Admission Screen:  Have you recently lost weight without trying? \"NO\"  Have you been eating poorly because of a decreased appetite? \"NO\"    Pt known from recent admit  Had a FT for nutrition  FT was pulled on 4/29/25, as pt was eating well    5/6: ARU Dietitian note: \"Pt reports good appetite and eating well. He has been consuming 100% of meal trays. He does not feel like he needs any snacks/supplements at this time.\"       Ht: 5'11\"  IBW: 78.2 kg  Wt hx:  No wt taken this admit  Pt previously reported usual wt of ~185#  Wt Readings from Last 10 Encounters:   05/07/25 80 kg (176 lb 5.9 oz)   04/26/25 83.4 kg (183 lb 13.8 oz)   04/16/25 83.9 kg (185 lb)   04/09/25 86.2 kg (190 lb)   02/17/25 86.2 kg (190 lb)   02/14/25 87.8 kg (193 lb 9.6 oz)   01/02/25 85.7 kg (189 lb)   12/27/24 83 kg (183 lb)   12/24/24 86.1 kg (189 lb 13.1 oz)   12/09/24 88.6 kg (195 lb 6.4 oz)     Diet: Moderate CHO          Room Service with Assist  Pt has been ordering meals TID  Per flowsheets, meal intake has been 100%    No documented pressure injuries      Follow Up / Monitoring:   Per policy unless consulted    Tamara Herrera RD, LD  Clinical Dietitian - [facility]   Pager - [phone]        "

## 2025-05-19 NOTE — PLAN OF CARE
Pt here with IP. A&O x3-4, off on exact date at times, forgetful, [place]. Neuros with slight L droop, LLE 4/5. HR clement, intermittently dipped to high 30's while sleeping. MD notified. Other VSS. Tele A fib CVR. Mod carb diet, thin liquids. Takes pills whole. Up with A1 GB/W. Denies pain. Pt scoring green on the Aggression Stop Light Tool. Plan to start radiation today.

## 2025-05-19 NOTE — CONSULTS
[facility]    Hematology/Oncology Consult Note      Date of Admission:  5/12/2025  Date of Consult:  05/19/25  Reason for Consult: High-grade neuroendocrine cancer, pancytopenia      ASSESSMENT/PLAN:  Pascual Perea is a 84 year old male who we are asked to see for pancytopenia    In the consult order there was also mention of neutropenia but patient is not neutropenic.  Would recommend continue following.  Fibrinogen levels are pending.  With the recent worsening bleed suspect likely this is consumptive coagulopathy and if fibrinogen less than 100 would recommend giving cryoprecipitate    1.  Pancytopenia: Proceed with current care he is not neutropenic.  2.  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  3.  Fibrinogen levels pending May be consumptive coagulopathy due to recent tumor and bleed  4.  Hemoglobin is stable.  Will continue to follow        HISTORY OF PRESENT ILLNESS: Pascual Perea is a 84 year old male who presents with with a history of high-grade neuroendocrine cancer.  Status post chemoradiation thought to be in remission atrial fibrillation on DOAC, diabetes, chronic kidney disease hypothyroidism who presented to [facility] on 4/16 with lethargy, somnolence and word finding difficulties.  CT of the head showed a 5.3 cm temporal lobe mass brain MRI confirmed it and pathology status post right frontal temporal craniotomy showed metastatic small cell carcinoma.  CT of the chest abdomen and pelvis completed on 4/17/2025 showed no evidence of progressive metastatic disease within the chest abdomen or pelvis.  Previous chemotherapy completed on 10/21/2024    Patient was in acute care rehab with gradual decline in CT of the head showed hemorrhage with residual tumor platelet count 49,000 patient was transferred to the hospital and 2 units of platelets were  transfused.  Count now at 95,000.Recent CT of the head showing slightly increased moderate volume acute intraventricular hemorrhage.        Radiation oncology saw the patient and the plan was to initiate radiation therapy status post treatment 1 today.  We are asked to see the patient for pancytopenia.  White count today improved at 2.4 neutrophils are at 1800.  Hemoglobin 9.4 platelet count was 68,000 now at 92,000 status post transfusion.  Fibrinogen levels are pending          REVIEW OF SYSTEMS:   14 point ROS was reviewed and is negative other than as noted above in HPI.       MEDICATIONS:  Current Facility-Administered Medications   Medication Dose Route Frequency Provider Last Rate Last Admin    acetaminophen (TYLENOL) tablet 650 mg  650 mg Oral Q4H PRN Arleen Winkler MD   650 mg at 05/19/25 0626    Or    acetaminophen (TYLENOL) oral liquid 650 mg  650 mg Per NG tube Q4H PRN Arleen Winkler MD        artificial saliva (BIOTENE MT) solution 1 spray  1 spray Mouth/Throat At Bedtime Arleen Winkler MD   1 spray at 05/18/25 2127    atorvastatin (LIPITOR) tablet 40 mg  40 mg Oral Daily Arleen Winkler MD   40 mg at 05/18/25 0819    azelastine (ASTELIN) nasal spray 2 spray  2 spray Both Nostrils BID Arleen Winkler MD   2 spray at 05/19/25 1339    carboxymethylcellulose PF (REFRESH PLUS) 0.5 % ophthalmic solution 1 drop  1 drop Both Eyes BID Arleen Winkler MD   1 drop at 05/19/25 1338    ceFEPIme (MAXIPIME) 2 g vial to attach to  mL bag for ADULTS or NS 50 mL bag for PEDS  2 g Intravenous Q8H Arleen Winkler  mL/hr at 05/19/25 1629 2 g at 05/19/25 1629    glucose gel 15-30 g  15-30 g Oral Q15 Min PRN Arleen Winkler MD        Or    dextrose 50 % injection 25-50 mL  25-50 mL Intravenous Q15 Min PRN Arleen Winkler MD        Or    glucagon injection 1 mg  1 mg Subcutaneous Q15 Min PRN Arleen Winkler MD        fenofibrate (LOFIBRA) tablet 54 mg  54 mg Oral Daily with breakfast Arleen Winkler MD   54 mg at 05/18/25  0819    [Held by provider] heparin ANTICOAGULANT injection 5,000 Units  5,000 Units Subcutaneous Q12H Arleen Winkler MD   5,000 Units at 05/18/25 2014    hydrALAZINE (APRESOLINE) injection 10-20 mg  10-20 mg Intravenous Q30 Min PRN Arleen Winkler MD        insulin aspart (NovoLOG) injection (RAPID ACTING)  1-10 Units Subcutaneous TID AC Arleen Winkler MD   1 Units at 05/18/25 1807    insulin aspart (NovoLOG) injection (RAPID ACTING)  1-7 Units Subcutaneous At Bedtime Arleen Winkler MD   1 Units at 05/18/25 2126    [START ON 5/20/2025] insulin glargine (LANTUS PEN) injection 10 Units  10 Units Subcutaneous QAM AC Reese Dixon MD        labetalol (NORMODYNE/TRANDATE) injection 10-20 mg  10-20 mg Intravenous Q10 Min PRN Arleen Winkler MD        lactated ringers infusion   Intravenous Continuous Reese Dixon MD        levETIRAcetam (KEPPRA) tablet 1,000 mg  1,000 mg Oral BID Reese Dixon MD        Or    levETIRAcetam (KEPPRA) intermittent infusion 1,000 mg  1,000 mg Intravenous BID Reese Dixon  mL/hr at 05/19/25 1332 1,000 mg at 05/19/25 1332    levothyroxine (SYNTHROID/LEVOTHROID) tablet 125 mcg  125 mcg Oral Daily Arleen Winkler MD   125 mcg at 05/18/25 0819    lidocaine (LMX4) cream   Topical Q1H PRN Arleen Winkler MD        lidocaine 1 % 0.1-1 mL  0.1-1 mL Other Q1H PRN Arleen Winkler MD        lisinopril (ZESTRIL) tablet 5 mg  5 mg Oral Daily Arleen Winkler MD   5 mg at 05/18/25 0819    ondansetron (ZOFRAN ODT) ODT tab 4 mg  4 mg Oral Q6H PRN Arleen Winkler MD        Or    ondansetron (ZOFRAN) injection 4 mg  4 mg Intravenous Q6H PRN Arleen Winkler MD        polyethylene glycol (MIRALAX) Packet 17 g  17 g Oral Daily PRArleen Kwan MD        senna-docusate (SENOKOT-S/PERICOLACE) 8.6-50 MG per tablet 1 tablet  1 tablet Oral BID Arleen Fraser MD        Or    senna-docusate (SENOKOT-S/PERICOLACE) 8.6-50 MG per tablet 2 tablet  2 tablet Oral BID Arleen Fraser MD        sodium  chloride (PF) 0.9% PF flush 3 mL  3 mL Intracatheter Q8H BOYD Arleen Winkler MD   3 mL at 05/19/25 0002    sodium chloride (PF) 0.9% PF flush 3 mL  3 mL Intracatheter q1 min prn Arleen Winkler MD   3 mL at 05/15/25 1012         ALLERGIES:  Allergies   Allergen Reactions    Omeprazole      diarrhea    Pantoprazole      diarrhea         PAST MEDICAL HISTORY:  Past Medical History:   Diagnosis Date    Adhesive capsulitis of shoulder     Allergic rhinitis 01/07/2008    Problem list name updated by automated process. Provider to review      Anemia 03/10/2014    Anemia, unspecified     Antiplatelet or antithrombotic long-term use     Arrhythmia     Atrial fib/flutter    Atrial fibrillation, unspecified type (H) 05/02/2018    CAD (coronary artery disease)     Chemotherapy-induced neutropenia 07/19/2024    Coronary artery disease involving native coronary artery of native heart without angina pectoris 02/16/2025    Coronary atherosclerosis     Nuc Stress 4/15/16: On both stress and rest images there is a very small size mild intensity apical photopenic defect which is of equal extent and intensity on both stress and rest images. Gated images demonstrated no regional wall motion abnormalities. The left ventricular ejection fraction was calculated to be 61% with stress.      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    History of cardioversion 04/24/2018    a single synchronized shock of 120 joules restored normal sinus rhythm    History of cardioversion 02/13/2019    single shock , 200 joules successul in restoring NSR    History of colonic polyps 03/24/2017    Hyperlipidemia LDL goal <70 05/26/2011    Hypertension     Hypothyroidism     Hypothyroidism, unspecified type 08/15/2016    Impotence of organic origin     Laceration of finger 06/2010     left  thumb s/p sutures    Numbness and tingling     diabetic neuropathy bilateral feet    BRANDON (obstructive sleep apnea)     intolerant of CPAP, uses it occasionally.  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 disease, with long-term current use of insulin (H) 02/16/2025    Type 2 diabetes mellitus without complication  (goal A1C<8) 10/24/2015    Typical atrial flutter (H) 04/18/2016    Unspecified hypothyroidism     Vitamin D deficiency 09/03/2011         PAST SURGICAL HISTORY:  Past Surgical History:   Procedure Laterality Date    ANESTHESIA CARDIOVERSION N/A 02/13/2019    Procedure: ANESTHESIA CARDIOVERSION;  Surgeon: GENERIC ANESTHESIA PROVIDER;  Location:  OR    ANESTHESIA CARDIOVERSION N/A 05/22/2019    Procedure: ANESTHESIA, FOR CARDIOVERSION;  Surgeon: GENERIC ANESTHESIA PROVIDER;  Location:  OR    CARDIAC SURGERY      bypass in 1997    CARDIOVERSION  04/24/2018    COLONOSCOPY      CORONARY ANGIOGRAPHY ADULT ORDER      4/2/2012    CORONARY ARTERY BYPASS  1997    [facility]    ENDOSCOPIC ULTRASOUND UPPER GASTROINTESTINAL TRACT (GI) N/A 03/14/2019    Procedure: ENDOSCOPIC ULTRASOUND OF UPPER GASTROINTESTINAL TRACT;  Surgeon: Ju Torres MD;  Location:  OR    ESOPHAGOSCOPY, GASTROSCOPY, DUODENOSCOPY (EGD), COMBINED N/A 4/15/2022    Procedure: ESOPHAGOGASTRODUODENOSCOPY (EGD);  Surgeon: Erik Baca DO;  Location:  GI    EXCISE MASS HEAD Left 08/18/2016    Procedure: EXCISE MASS HEAD;  Surgeon: Ivan Hernandez MD;  Location:  SD    HEART CATH, ANGIOPLASTY  04/2012     IR CHEST PORT PLACEMENT > 5 YRS OF AGE  2024    IR PORT REMOVAL RIGHT  2025    LAPAROSCOPIC CHOLECYSTECTOMY N/A 2019    Procedure: LAPAROSCOPIC CHOLECYSTECTOMY;  Surgeon: Janel Paz MD;  Location:  OR    OPTICAL TRACKING SYSTEM CRANIOTOMY, EXCISE TUMOR, COMBINED Right 2025    Procedure: RIGHT FRONTO-TEMPORAL CRANIOTOMY, USING OPTICAL TRACKING SYSTEM, WITH NEOPLASM EXCISION;  Surgeon: Davie Erickson MD;  Location:  OR    PHACOEMULSIFICATION CLEAR CORNEA WITH STANDARD INTRAOCULAR LENS IMPLANT Left 2015    Procedure: PHACOEMULSIFICATION CLEAR CORNEA WITH STANDARD INTRAOCULAR LENS IMPLANT;  Surgeon: Nixon Farnsworth MD;  Location:  EC    PHACOEMULSIFICATION CLEAR CORNEA WITH STANDARD INTRAOCULAR LENS IMPLANT Right 2015    Procedure: PHACOEMULSIFICATION CLEAR CORNEA WITH STANDARD INTRAOCULAR LENS IMPLANT;  Surgeon: Nixon Farnsworth MD;  Location:  EC    SEPTOPLASTY, TURBINOPLASTY, COMBINED Bilateral 2016    Procedure: COMBINED SEPTOPLASTY, TURBINOPLASTY;  Surgeon: Ivan Hernandez MD;  Location:  SD    ZZ NONSPECIFIC PROCEDURE      CABG (Anabaptism)    ZZC NONSPECIFIC PROCEDURE  2001    stress echo         SOCIAL HISTORY:  Social History     Socioeconomic History    Marital status:      Spouse name: Not on file    Number of children: Not on file    Years of education: Not on file    Highest education level: Not on file   Occupational History    Not on file   Tobacco Use    Smoking status: Former     Current packs/day: 0.00     Average packs/day: 1 pack/day for 6.5 years (6.5 ttl pk-yrs)     Types: Cigarettes, Cigars, Pipe     Start date:      Quit date: 1964     Years since quittin.9    Smokeless tobacco: Never    Tobacco comments:     10/16/24 Pt states he will an occasional cigar.    Vaping Use    Vaping status: Never Used   Substance and Sexual Activity    Alcohol use: Not Currently     Comment: couple drinks a year    Drug  use: No    Sexual activity: Yes     Partners: Female     Birth control/protection: Abstinence   Other Topics Concern    Parent/sibling w/ CABG, MI or angioplasty before 65F 55M? No     Service Not Asked    Blood Transfusions Not Asked    Caffeine Concern Yes     Comment: 2 cups coffee a day    Occupational Exposure Not Asked    Hobby Hazards Not Asked    Sleep Concern Yes     Comment: sleep apnea, wears cpap off and on    Stress Concern No    Weight Concern No    Special Diet Yes     Comment: diabetic diet     Back Care Not Asked    Exercise No     Comment: occ walk the mall    Bike Helmet Not Asked    Seat Belt Yes    Self-Exams Not Asked   Social History Narrative    Not on file     Social Drivers of Health     Financial Resource Strain: Low Risk  (5/12/2025)    Financial Resource Strain     Within the past 12 months, have you or your family members you live with been unable to get utilities (heat, electricity) when it was really needed?: No   Food Insecurity: Low Risk  (5/12/2025)    Food Insecurity     Within the past 12 months, did you worry that your food would run out before you got money to buy more?: No     Within the past 12 months, did the food you bought just not last and you didn t have money to get more?: No   Transportation Needs: Low Risk  (5/12/2025)    Transportation Needs     Within the past 12 months, has lack of transportation kept you from medical appointments, getting your medicines, non-medical meetings or appointments, work, or from getting things that you need?: No   Physical Activity: Insufficiently Active (2/9/2025)    Exercise Vital Sign     Days of Exercise per Week: 7 days     Minutes of Exercise per Session: 20 min   Stress: No Stress Concern Present (2/9/2025)    [employer] [place] - Occupational Stress Questionnaire     Feeling of Stress : Not at all   Social Connections: Unknown (2/9/2025)    Social Connection and Isolation Panel [NHANES]     Frequency  "of Communication with Friends and Family: Not on file     Frequency of Social Gatherings with Friends and Family: Three times a week     Attends [employer] Services: Not on file     Active Member of Clubs or Organizations: Not on file     Attends Club or Organization Meetings: Not on file     Marital Status: Not on file   Interpersonal Safety: Low Risk  (5/15/2025)    Interpersonal Safety     Do you feel physically and emotionally safe where you currently live?: Yes     Within the past 12 months, have you been hit, slapped, kicked or otherwise physically hurt by someone?: No     Within the past 12 months, have you been humiliated or emotionally abused in other ways by your partner or ex-partner?: No   Housing Stability: Low Risk  (2025)    Housing Stability     Do you have housing? : Yes     Are you worried about losing your housing?: No         FAMILY HISTORY:  Family History   Problem Relation Age of Onset    Genitourinary Problems Father         d: age 89; ARF    Hypertension Father     Diabetes Mother         d: age 68, CAD    Heart Disease Mother         MI    Myocardial Infarction Mother     Cardiovascular Brother         d:  age 31; CAD    Heart Disease Brother         age 31, MI    Diabetes Sister         b:  1939; DM2    Diabetes Sister          PHYSICAL EXAM:  Vital signs:  Temp: 97.8  F (36.6  C) Temp src: Axillary BP: 138/68 Pulse: 60   Resp: 18 SpO2: 96 % O2 Device: None (Room air)     Weight: 78.1 kg (172 lb 2.9 oz)  Estimated body mass index is 24.01 kg/m  as calculated from the following:    Height as of 25: 1.803 m (5' 11\").    Weight as of this encounter: 78.1 kg (172 lb 2.9 oz).    ECO  GENERAL/CONSTITUTIONAL: No acute distress.  EYES: Pupils are equal, round, and react to light and accommodation. Extraocular movements intact.  No scleral icterus.  ENT/MOUTH: Neck supple. Oropharynx clear, no mucositis.  LYMPH: No anterior cervical, posterior cervical, supraclavicular, axillary or " inguinal adenopathy.   RESPIRATORY: Clear to auscultation bilaterally. No crackles or wheezing.   CARDIOVASCULAR: Regular rate and rhythm without murmurs, gallops, or rubs.  GASTROINTESTINAL: No hepatosplenomegaly, masses, or tenderness. The patient has normal bowel sounds. No guarding.  No distention.  MUSCULOSKELETAL: Warm and well-perfused, no cyanosis, clubbing, or edema.  NEUROLOGIC: Cranial nerves II-XII are intact. Alert, oriented, answers questions appropriately.  INTEGUMENTARY: No rashes or jaundice.  GAIT: Steady, does not use assistive device      LABS:  CBC RESULTS:   Recent Labs   Lab Test 05/19/25  1626 05/19/25  0859   WBC  --  2.4*   RBC  --  3.16*   HGB  --  9.4*   HCT  --  27.9*   MCV 91 88   MCH  --  29.7   MCHC  --  33.7   RDW  --  15.5*   PLT 95* 68*       Recent Labs   Lab Test 05/19/25  0915 05/19/25  0839 05/17/25  1142 05/17/25  0843     --   --  138   POTASSIUM 4.2  --   --  4.1   CHLORIDE 103  --   --  103   CO2 23  --   --  25   ANIONGAP 11  --   --  10   * 182*   < > 163*   BUN 14.9  --   --  10.9   CR 0.95  --   --  1.02   STARR 9.6  --   --  9.9    < > = values in this interval not displayed.         PATHOLOGY:      IMAGING:            Thank you for the opportunity to participate in this patient's care.  Please call with any questions.    Doron Lockwood MD  Hematology/Oncology  [facility]

## 2025-05-19 NOTE — PROGRESS NOTES
Pt was present in Radiation Oncology for treatment number 1 of 5 to the right temporal brain. Pt received a dose of 600 cGy using 6FFF Photons. Patient will return tomorrow, 5/20 for the next treatment.

## 2025-05-19 NOTE — CONSULTS
[facility]     Stroke Code Note          History of Present Illness     Chief Complaint: No chief complaint on file.      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 He had remained stable and had started radiation treatment with oncology.    Today stroke code called due to RN noting worsening L arm and leg weakness at 0850 this morning. There was also report overnight of worsening L facial droop.    Of note he was just started on Heparin subcutaneous VTE ppx 5/17 2100. Platelets today 68K.         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 4/16 large R temporal mass with hemorrhage and edema s/p R frontotemporal craniotomy with partial neoplasm excision, post op cardiopulmonary arrest and seizures (started on Keppra), thrombocytopenia, discharged to ARU 5/4/25 without resuming eliquis    Preadmission antithrombotic regimen: Eliquis was on hold , heparin VTE ppx was recently started                    Assessment and Plan       1.  Recurrent R temporal IPH due to metastatic brain lesion with new acute R lateral ventricle hemorrhage suspicious for acute choroid plexus hemorrhage versus IPH extension, also small L lateral ventricle hemorrhage, likely contributed by worsening thrombocytopenia and recently started VTE prophylaxis  2. Stable vasogenic edema 2/2 above     Intravenous Thrombolysis  Not given due to:   - active bleeding     Endovascular Treatment  Not initiated due to absence of proximal vessel occlusion     Plan:  -neurosurgery notified  -euglycemia, eunatremia, euthermia  -Neuro checks and vitals  "every 1 hours  -Labs: monitor coags  -recommend platelet transfusion to achieve platelet goal as recommended by neursurgery and oncology  -SBP goal <150 (currently at goal)  -PT/OT/SPT , strict NPO, Dysphagia screen  -Seizure precautions  -Continue Keppra 1 g every 12 hours, monitor for any seizure-like activity, page general neurology if any concern for seizure and get EEG  -will defer to NSGY and oncology if any need for repeat MRI  -Hold all antiplatelets/anticoagulants/NSAIDs, and pharmacologic VTE prophylaxis  -Elevate head of bed 30 degrees  -repeat CT head wo contrast in 4-6 hours (ordered for 1400 today)      Discussed with vascular neurologist, Dr. Childress     ___________________________________________________________________    Fatou Brantley PA-C  Vascular Neurology    To page me or covering stroke neurology team member, click here: [facility]  Choose \"On Call\" tab at top, then select \"NEUROLOGY/ALL SITES\" from middle drop-down box, press Enter, then look for \"stroke\" or \"telestroke\" for your site.  ___________________________________________________________________        Imaging/Labs   (personally reviewed)    HEAD CT:  1.  Somewhat increased size of right temporal region parenchymal hemorrhage with similar surrounding vasogenic edema and mass effect/midline shift. No hydrocephalus.  2.  New acute hemorrhage in the posterior aspect of the right lateral ventricle suspicious for acute choroid plexus hemorrhage. Small focus of hemorrhage in the dependent left lateral ventricle as well.     HEAD CTA:   1.  Negative for large vessel occlusion, aneurysm, or high flow vascular malformation.     NECK CTA:  1.  Patent carotid and vertebral arteries. Negative for high-grade arterial stenosis or acute dissection.         Physical Examination     BP: 119/61   Pulse: 54   Resp: 16   Temp: 99  F (37.2  C)   Temp src: Oral   SpO2: 95 %   O2 Device: None (Room air)        Wt Readings from Last 2 Encounters:   05/07/25 80 " kg (176 lb 5.9 oz)   04/26/25 83.4 kg (183 lb 13.8 oz)       General Exam  General:  patient lying in bed without any acute distress   HEENT:  normocephalic/atraumatic  Pulmonary:  no respiratory distress on nasal cannula      Neuro Exam  Mental Status:  eyes closed as enter room but arouses quickly to verbal stimulation, oriented, follows commands, speech mild-moderately dysarthric but fluent and naming/repetition intact  Cranial Nerves:  L homonymous hemianopsia, R eye ptosis, no gaze deviation noted, mild L facial droop, hearing not formally tested but intact to conversation, tongue protrusion midline  Motor:  no drift to R arm and leg strength 5/5, some effort against gravity strength 3/5 to L arm and leg   Reflexes:  unable to test (telestroke)  Sensory:  mild decreased sensation to L side compared to the R, appears to have L tacticle extinction, can't assess visual extinction due to visual deficit, no spatial/personal extinction noted  Coordination:  normal finger-to-nose and heel-to-shin bilaterally without dysmetria out of proportion to weakness  Station/Gait:  unable to test due to telestroke        Stroke Scales     National [employer] Stroke Scale  Exam Interval:    Score    Level of consciousness: (1)   Not alert; arousable w/ minor stim to obey/answer/respond    LOC questions: (0)   Answers both questions correctly    LOC commands: (0)   Performs both tasks correctly    Best gaze: (0)   Normal    Visual: (2)   Complete hemianopia (L)    Facial palsy: (1)   Minor paralysis (flat nasolabial fold, smile asymmetry) (L)    Motor arm (left): (2)   Some effort against gravity    Motor arm (right): (0)   No drift    Motor leg (left): (2)   Some effort against gravity    Motor leg (right): (0)   No drift    Limb ataxia: (0)   Absent- not out of proportion to weakness    Sensory: (1)   Mild to moderate sensory loss    Best language: (0)   Normal- no aphasia    Dysarthria: (1)   Mild to moderate  dysarthria    Extinction and inattention: (1)   Visual, tacile, auditory, spatial, person inattention (L tacticle, can't assess visual extinction due to visual deficit, no spatial/personal extinction noted)        Total Score:  11             Labs     CBC  Lab Results   Component Value Date    HGB 9.4 (L) 05/19/2025    HCT 27.9 (L) 05/19/2025    WBC 2.4 (L) 05/19/2025    PLT 68 (L) 05/19/2025       BMP  Lab Results   Component Value Date     05/19/2025    POTASSIUM 4.2 05/19/2025    CHLORIDE 103 05/19/2025    CO2 23 05/19/2025    BUN 14.9 05/19/2025    CR 0.95 05/19/2025     (H) 05/19/2025    STARR 9.6 05/19/2025       INR  INR   Date Value Ref Range Status   05/19/2025 1.04 0.85 - 1.15 Final   05/12/2025 1.14 0.85 - 1.15 Final   04/22/2025 1.21 (H) 0.85 - 1.15 Final       Data   Stroke Code Data  (for stroke code without tele)  Stroke code activated 05/19/25  0905   First stroke provider response 05/19/25  0909   Last known normal 05/18/25  (S)  (sometime last night  since overnight also noted some L facial droop that was reportedly new)   Time of discovery (or onset of symptoms) 05/19/25  (S) 0850 (L arm and leg weakness but there was some report also of mild L facial droop overnight)   Head CT read by Stroke Neuro Provider 05/19/25  0937   Was stroke code de-escalated? (S) No (worsening hemorrhage)              Clinically Significant Risk Factors              # Thrombocytopenia: Lowest platelets = 68 in last 2 days, will monitor for bleeding   # Hypertension: Noted on problem list           # DMII: A1C = 7.9 % (Ref range: <5.7 %) within past 6 months         # Financial/Environmental Concerns: none          Time Spent on this Encounter   Billing: I personally examined and evaluated the patient today. At the time of my evaluation and management the patient was critical condition today due to stroke code. I personally managed review of chart, medical record, meds, imaging, history, exam, and discussion  with attending regarding plan and documentation. I spent a total of 70 minutes providing critical care services, evaluating the patient, directing care and reviewing laboratory values and radiologic reports.

## 2025-05-20 ENCOUNTER — APPOINTMENT (OUTPATIENT)
Dept: OCCUPATIONAL THERAPY | Facility: CLINIC | Age: 85
DRG: 064 | End: 2025-05-20
Attending: INTERNAL MEDICINE
Payer: COMMERCIAL

## 2025-05-20 LAB
ALBUMIN SERPL BCG-MCNC: 3.5 G/DL (ref 3.5–5.2)
ALP SERPL-CCNC: 107 U/L (ref 40–150)
ALT SERPL W P-5'-P-CCNC: 9 U/L (ref 0–70)
ANION GAP SERPL CALCULATED.3IONS-SCNC: 9 MMOL/L (ref 7–15)
APTT PPP: 27 SECONDS (ref 22–38)
AST SERPL W P-5'-P-CCNC: 14 U/L (ref 0–45)
BASOPHILS # BLD AUTO: 0 10E3/UL (ref 0–0.2)
BASOPHILS NFR BLD AUTO: 1 %
BILIRUB SERPL-MCNC: 0.8 MG/DL
BUN SERPL-MCNC: 11.3 MG/DL (ref 8–23)
CALCIUM SERPL-MCNC: 9.6 MG/DL (ref 8.8–10.4)
CHLORIDE SERPL-SCNC: 102 MMOL/L (ref 98–107)
CREAT SERPL-MCNC: 0.94 MG/DL (ref 0.67–1.17)
CRP SERPL-MCNC: 4.32 MG/L
DACRYOCYTES BLD QL SMEAR: SLIGHT
EGFRCR SERPLBLD CKD-EPI 2021: 80 ML/MIN/1.73M2
ELLIPTOCYTES BLD QL SMEAR: SLIGHT
EOSINOPHIL # BLD AUTO: 0.1 10E3/UL (ref 0–0.7)
EOSINOPHIL NFR BLD AUTO: 2 %
ERYTHROCYTE [DISTWIDTH] IN BLOOD BY AUTOMATED COUNT: 15.9 % (ref 10–15)
ERYTHROCYTE [DISTWIDTH] IN BLOOD BY AUTOMATED COUNT: 16.1 % (ref 10–15)
FIBRINOGEN PPP-MCNC: 387 MG/DL (ref 170–510)
GLUCOSE BLDC GLUCOMTR-MCNC: 125 MG/DL (ref 70–99)
GLUCOSE BLDC GLUCOMTR-MCNC: 140 MG/DL (ref 70–99)
GLUCOSE BLDC GLUCOMTR-MCNC: 166 MG/DL (ref 70–99)
GLUCOSE BLDC GLUCOMTR-MCNC: 273 MG/DL (ref 70–99)
GLUCOSE BLDC GLUCOMTR-MCNC: 296 MG/DL (ref 70–99)
GLUCOSE SERPL-MCNC: 156 MG/DL (ref 70–99)
HCO3 SERPL-SCNC: 26 MMOL/L (ref 22–29)
HCT VFR BLD AUTO: 28.7 % (ref 40–53)
HCT VFR BLD AUTO: 30.5 % (ref 40–53)
HGB BLD-MCNC: 9.4 G/DL (ref 13.3–17.7)
HGB BLD-MCNC: 9.9 G/DL (ref 13.3–17.7)
IMM GRANULOCYTES # BLD: 0 10E3/UL
IMM GRANULOCYTES NFR BLD: 1 %
INR PPP: 1.13 (ref 0.85–1.15)
LYMPHOCYTES # BLD AUTO: 0.6 10E3/UL (ref 0.8–5.3)
LYMPHOCYTES NFR BLD AUTO: 21 %
MCH RBC QN AUTO: 29.5 PG (ref 26.5–33)
MCH RBC QN AUTO: 30 PG (ref 26.5–33)
MCHC RBC AUTO-ENTMCNC: 32.5 G/DL (ref 31.5–36.5)
MCHC RBC AUTO-ENTMCNC: 32.8 G/DL (ref 31.5–36.5)
MCV RBC AUTO: 90 FL (ref 78–100)
MCV RBC AUTO: 92 FL (ref 78–100)
MONOCYTES # BLD AUTO: 0.3 10E3/UL (ref 0–1.3)
MONOCYTES NFR BLD AUTO: 10 %
NEUTROPHILS # BLD AUTO: 1.7 10E3/UL (ref 1.6–8.3)
NEUTROPHILS NFR BLD AUTO: 65 %
NRBC # BLD AUTO: 0 10E3/UL
NRBC BLD AUTO-RTO: 0 /100
PLAT MORPH BLD: ABNORMAL
PLATELET # BLD AUTO: 82 10E3/UL (ref 150–450)
PLATELET # BLD AUTO: 91 10E3/UL (ref 150–450)
POTASSIUM SERPL-SCNC: 4.1 MMOL/L (ref 3.4–5.3)
PROT SERPL-MCNC: 5.7 G/DL (ref 6.4–8.3)
PROTHROMBIN TIME: 14.2 SECONDS (ref 11.8–14.8)
RBC # BLD AUTO: 3.19 10E6/UL (ref 4.4–5.9)
RBC # BLD AUTO: 3.3 10E6/UL (ref 4.4–5.9)
RBC MORPH BLD: ABNORMAL
SODIUM SERPL-SCNC: 137 MMOL/L (ref 135–145)
WBC # BLD AUTO: 2.6 10E3/UL (ref 4–11)
WBC # BLD AUTO: 3.6 10E3/UL (ref 4–11)

## 2025-05-20 PROCEDURE — 258N000003 HC RX IP 258 OP 636: Performed by: STUDENT IN AN ORGANIZED HEALTH CARE EDUCATION/TRAINING PROGRAM

## 2025-05-20 PROCEDURE — 85004 AUTOMATED DIFF WBC COUNT: CPT | Performed by: INTERNAL MEDICINE

## 2025-05-20 PROCEDURE — 250N000011 HC RX IP 250 OP 636: Mod: JZ | Performed by: STUDENT IN AN ORGANIZED HEALTH CARE EDUCATION/TRAINING PROGRAM

## 2025-05-20 PROCEDURE — 97530 THERAPEUTIC ACTIVITIES: CPT | Mod: GO

## 2025-05-20 PROCEDURE — 85041 AUTOMATED RBC COUNT: CPT | Performed by: STUDENT IN AN ORGANIZED HEALTH CARE EDUCATION/TRAINING PROGRAM

## 2025-05-20 PROCEDURE — 36415 COLL VENOUS BLD VENIPUNCTURE: CPT | Performed by: STUDENT IN AN ORGANIZED HEALTH CARE EDUCATION/TRAINING PROGRAM

## 2025-05-20 PROCEDURE — 84450 TRANSFERASE (AST) (SGOT): CPT | Performed by: STUDENT IN AN ORGANIZED HEALTH CARE EDUCATION/TRAINING PROGRAM

## 2025-05-20 PROCEDURE — 97535 SELF CARE MNGMENT TRAINING: CPT | Mod: GO

## 2025-05-20 PROCEDURE — 85018 HEMOGLOBIN: CPT | Performed by: STUDENT IN AN ORGANIZED HEALTH CARE EDUCATION/TRAINING PROGRAM

## 2025-05-20 PROCEDURE — 250N000013 HC RX MED GY IP 250 OP 250 PS 637: Performed by: NURSE PRACTITIONER

## 2025-05-20 PROCEDURE — 85610 PROTHROMBIN TIME: CPT | Performed by: STUDENT IN AN ORGANIZED HEALTH CARE EDUCATION/TRAINING PROGRAM

## 2025-05-20 PROCEDURE — 97168 OT RE-EVAL EST PLAN CARE: CPT | Mod: GO

## 2025-05-20 PROCEDURE — 120N000001 HC R&B MED SURG/OB

## 2025-05-20 PROCEDURE — 99232 SBSQ HOSP IP/OBS MODERATE 35: CPT | Performed by: STUDENT IN AN ORGANIZED HEALTH CARE EDUCATION/TRAINING PROGRAM

## 2025-05-20 PROCEDURE — 250N000013 HC RX MED GY IP 250 OP 250 PS 637: Performed by: STUDENT IN AN ORGANIZED HEALTH CARE EDUCATION/TRAINING PROGRAM

## 2025-05-20 PROCEDURE — 250N000012 HC RX MED GY IP 250 OP 636 PS 637: Performed by: NURSE PRACTITIONER

## 2025-05-20 PROCEDURE — 86140 C-REACTIVE PROTEIN: CPT | Performed by: STUDENT IN AN ORGANIZED HEALTH CARE EDUCATION/TRAINING PROGRAM

## 2025-05-20 PROCEDURE — 250N000013 HC RX MED GY IP 250 OP 250 PS 637: Performed by: INTERNAL MEDICINE

## 2025-05-20 PROCEDURE — 85730 THROMBOPLASTIN TIME PARTIAL: CPT | Performed by: STUDENT IN AN ORGANIZED HEALTH CARE EDUCATION/TRAINING PROGRAM

## 2025-05-20 PROCEDURE — 85384 FIBRINOGEN ACTIVITY: CPT | Performed by: STUDENT IN AN ORGANIZED HEALTH CARE EDUCATION/TRAINING PROGRAM

## 2025-05-20 PROCEDURE — 85025 COMPLETE CBC W/AUTO DIFF WBC: CPT | Performed by: INTERNAL MEDICINE

## 2025-05-20 PROCEDURE — 82435 ASSAY OF BLOOD CHLORIDE: CPT | Performed by: STUDENT IN AN ORGANIZED HEALTH CARE EDUCATION/TRAINING PROGRAM

## 2025-05-20 RX ORDER — DEXAMETHASONE 4 MG/1
4 TABLET ORAL EVERY 12 HOURS SCHEDULED
Status: DISCONTINUED | OUTPATIENT
Start: 2025-05-20 | End: 2025-05-21

## 2025-05-20 RX ADMIN — AZELASTINE HYDROCHLORIDE 2 SPRAY: 137 SPRAY, METERED NASAL at 20:34

## 2025-05-20 RX ADMIN — CARBOXYMETHYLCELLULOSE SODIUM 1 DROP: 5 SOLUTION/ DROPS OPHTHALMIC at 08:34

## 2025-05-20 RX ADMIN — INSULIN ASPART 7 UNITS: 100 INJECTION, SOLUTION INTRAVENOUS; SUBCUTANEOUS at 17:22

## 2025-05-20 RX ADMIN — FENOFIBRATE 54 MG: 54 TABLET ORAL at 08:34

## 2025-05-20 RX ADMIN — Medication 1 SPRAY: at 23:03

## 2025-05-20 RX ADMIN — LISINOPRIL 5 MG: 2.5 TABLET ORAL at 08:34

## 2025-05-20 RX ADMIN — CARBOXYMETHYLCELLULOSE SODIUM 1 DROP: 5 SOLUTION/ DROPS OPHTHALMIC at 20:33

## 2025-05-20 RX ADMIN — AZELASTINE HYDROCHLORIDE 2 SPRAY: 137 SPRAY, METERED NASAL at 08:50

## 2025-05-20 RX ADMIN — ACETAMINOPHEN 650 MG: 325 TABLET, FILM COATED ORAL at 08:57

## 2025-05-20 RX ADMIN — DEXAMETHASONE 4 MG: 4 TABLET ORAL at 20:34

## 2025-05-20 RX ADMIN — SODIUM CHLORIDE, SODIUM LACTATE, POTASSIUM CHLORIDE, AND CALCIUM CHLORIDE: .6; .31; .03; .02 INJECTION, SOLUTION INTRAVENOUS at 03:45

## 2025-05-20 RX ADMIN — LEVOTHYROXINE SODIUM 125 MCG: 125 TABLET ORAL at 08:34

## 2025-05-20 RX ADMIN — LEVETIRACETAM 1000 MG: 10 INJECTION INTRAVENOUS at 20:34

## 2025-05-20 RX ADMIN — DEXAMETHASONE 4 MG: 4 TABLET ORAL at 10:42

## 2025-05-20 RX ADMIN — ATORVASTATIN CALCIUM 40 MG: 40 TABLET, FILM COATED ORAL at 08:34

## 2025-05-20 RX ADMIN — LEVETIRACETAM 1000 MG: 500 TABLET, FILM COATED ORAL at 08:49

## 2025-05-20 RX ADMIN — INSULIN ASPART 1 UNITS: 100 INJECTION, SOLUTION INTRAVENOUS; SUBCUTANEOUS at 12:37

## 2025-05-20 ASSESSMENT — ACTIVITIES OF DAILY LIVING (ADL)
ADLS_ACUITY_SCORE: 62
ADLS_ACUITY_SCORE: 66
ADLS_ACUITY_SCORE: 66
ADLS_ACUITY_SCORE: 62
ADLS_ACUITY_SCORE: 62
ADLS_ACUITY_SCORE: 66
ADLS_ACUITY_SCORE: 70
ADLS_ACUITY_SCORE: 70
ADLS_ACUITY_SCORE: 62
ADLS_ACUITY_SCORE: 66
ADLS_ACUITY_SCORE: 62
ADLS_ACUITY_SCORE: 66
ADLS_ACUITY_SCORE: 70
ADLS_ACUITY_SCORE: 62
ADLS_ACUITY_SCORE: 62
ADLS_ACUITY_SCORE: 70
ADLS_ACUITY_SCORE: 62
ADLS_ACUITY_SCORE: 66
ADLS_ACUITY_SCORE: 62
ADLS_ACUITY_SCORE: 62
ADLS_ACUITY_SCORE: 66

## 2025-05-20 NOTE — PLAN OF CARE
"Goal Outcome Evaluation:  Lethargic at change of shift, \"I'm fine\" but refused to open eyes or follow commands. More alert and engaged with family at bedside. Oriented to self, place, and stated \"bleeding\" when asked about situation. Off on time. LUE and LLE weakness, neglect, facial droop. Sensation appears intact. Low BP, irregular HR. On RA. Denies pain. Good appetite this evening, on pureed diet with mildly thick liquids. Scoring green on aggression screening tool. Plan is for 3 out of 5 radiation treatments tomorrow.                             "

## 2025-05-20 NOTE — PROGRESS NOTES
"   05/20/25 0958   Appointment Info   Signing Clinician's Name / Credentials (OT) Germania Pop, OTBEVERLEY   Rehab Comments (OT) re-eval   Living Environment   Living Environment Comments See initial eval   Self-Care   Usual Activity Tolerance fair   Current Activity Tolerance poor   Activity/Exercise/Self-Care Comment Per initial eval, \"Use of a cane at baseline, has a shower chair prn, reports independence with ADLs at baseline.\"   General Information   Onset of Illness/Injury or Date of Surgery 05/12/25   Additional Occupational Profile Info/Pertinent History of Current Problem Pascual Perea is a 84 year old male with PMH high 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 Admitted to ICU with hospital medicine as primary. *5/19 noted to have worsening confusion, garbled speech, and L facial droop. Repeat CTH shows increased size of R temporal region parenchymal hemorrhage with similar surrounding vasogenic edema and mass effect/midline shift and a new acute hemorrhage in the posterior aspect of the right lateral ventricle suspicious for acute choroid plexus hemorrhage or intraparenchymal hemorrhage extension.   Existing Precautions/Restrictions fall   Limitations/Impairments safety/cognitive   Cognitive Status Examination   Orientation Status person   Affect/Mental Status (Cognitive) confused;flat/blunted affect;low arousal/lethargic   Follows Commands delayed response/completion;increased processing time needed   Safety Deficit severe deficit;at risk behavior observed;awareness of need for assistance;insight into deficits/self-awareness   Memory Deficit moderate deficit "   Cognitive Status Comments cont to monitor and assess cognition.   Visual Perception   Visual Attention L visual inattention.   Sensory   Sensory Quick Adds sensation intact   Sensory Comments Seems to ignore L UE during functional movement   Posture   Posture forward head position;protracted shoulders   Range of Motion Comprehensive   General Range of Motion upper extremity range of motion deficits identified   Comment, General Range of Motion R UE WFL, L UE impaired - slowed movement and only raises arm to 80deg shld flex   Strength Comprehensive (MMT)   General Manual Muscle Testing (MMT) Assessment upper extremity strength deficits identified   Comment, General Manual Muscle Testing (MMT) Assessment R UE 5/5, L UE 2/5   Coordination   Upper Extremity Coordination Left UE impaired   Functional Limitations Decreased speed;Fine motor ADL performance impaired;Impaired ability to perform bilateral tasks;Object transport impaired;Reach to targets impaired   Bed Mobility   Bed Mobility rolling left;rolling right   Rolling Left [place] (Bed Mobility) moderate assist (50% patient effort)   Rolling Right [place] (Bed Mobility) maximum assist (25% patient effort)   Supine-Sit [place] (Bed Mobility) maximum assist (25% patient effort)   Transfers   Transfers sit-stand transfer   Transfer Comments max A x2   Lower Body Dressing Assessment/Training   [place] Level (Lower Body Dressing) dependent (less than 25% patient effort)   Grooming Assessment/Training   [place] Level (Grooming) grooming skills;dependent (less than 25% patient effort)   Toileting   [place] Level (Toileting) dependent (less than 25% patient effort)   Clinical Impression   Criteria for Skilled Therapeutic Interventions Met (OT) Yes, treatment indicated   OT Diagnosis impaired functional mobilility and self cares   OT Problem List-Impairments impacting ADL problems related to;activity tolerance  "impaired;balance;cognition;strength;post-surgical precautions;coordination;mobility;range of motion (ROM);vision   Assessment of Occupational Performance 5 or more Performance Deficits   Identified Performance Deficits impaired I with toilet and shower transfer, dressing, shopping, med mgmt, etc   Planned Therapy Interventions (OT) ADL retraining;cognition;transfer training;home program guidelines;progressive activity/exercise;neuromuscular re-education;motor coordination training;fine motor coordination training;risk factor education;strengthening;visual perception   Clinical Decision Making Complexity (OT) detailed assessment/moderate complexity   Risk & Benefits of therapy have been explained evaluation/treatment results reviewed;care plan/treatment goals reviewed   OT Total Evaluation Time   OT Re-Eval Minutes (51874) 10   OT Goals   Therapy Frequency (OT) 5x/wk   OT Predicted Duration/Target Date for Goal Attainment 06/03/25   OT: Hygiene/Grooming minimal assist   OT: Upper Body Dressing Minimal assist   OT: Lower Body Dressing Minimal assist   OT: Toilet Transfer/Toileting Minimal assist   OT: Home Management Moderate assist;with light demand household tasks;ambulatory level   OT: Cognitive Patient/caregiver will verbalize understanding of cognitive assessment results/recommendations as needed for safe discharge planning   OT: Goal 1 Patient will located g/h items on L side with 1 verbal cues for attention.   Self-Care/Home Management   Self-Care/Home Mgmt/ADL, Compensatory, Meal Prep Minutes (21337) 25   Treatment Detail/Skilled Intervention Pt sitting up in bed, large loose stool. Pt has hand in loose stool, does not appear to be aware. Asked why he didnt call for help and pt states, \"it just came fast\" and no awareness to call after for help to get cleaned up. Pt initially attempts to help clean with R UE but just spreads it all over, multiple cues needed to stop pt. Dep to wash hands. rolls to the L with " "mod A, rolls to the R with max A. Dep for clean up.   Therapeutic Activities   Therapeutic Activity Minutes (50969) 10   Symptoms noted during/after treatment fatigue   Treatment Detail/Skilled Intervention Supine to sit with mod A. Sitting balance min-mod A, leans to the L. L arm hands off side of bed, seems to have little awareness of it. Cues to attend. Sit to stand with mod A x2, leaning to the L, L knee mitchel.Trialed FWW but pt's L hand slipping off and unable to facilitate L LE as FWW in the way. HHA with max A x2 for transfer about 6ft aware from bed to recliner chair, falling to the L. Facilitation needed to move L LE. Once in chair, pt reports, \"I could have done it if you stopped telling me what to do and let me walk\". Poor insight into ability. Alarm on and RN present at end of session.   OT Discharge Planning   OT Plan L scanning, L NMRE, functional transfers Ax2 (SS for nursing), cognitive screen   OT Discharge Recommendation (DC Rec) Transitional Care Facility   OT Rationale for DC Rec Pt significantly below baseline. Cancerous tumors in brain post resection, needs brain radiation, now increased bleed in brain presenting with L side weakness, poor midline orientation and poor insight. Needs Max A x2 for pivot txs. Recommend TCU at d/c.   OT Brief overview of current status Goals of therapy will be to address safe mobility and make recs for d/c to next level of care. Pt and RN will continue to follow all falls risk precautions as documented by RN staff while hospitalized   OT Total Distance Amb During Session (feet) 6   Total Session Time   Timed Code Treatment Minutes 35   Total Session Time (sum of timed and untimed services) 45     "

## 2025-05-20 NOTE — PLAN OF CARE
Goal Outcome Evaluation:    Neuro's are A&Ox4, PERRLA, mumbled/slurred speech, lethargic but wakes to speech. Left facial droop, LUE pronator drift, LLE weakness. Pt is forgetful and hard to keep aroused for assessments. SB/afib w/ PVCs and HR in the 50-60s per tele, on RA. Febrile w/ Tmax of 100.1 that went down after removing blankets. Denies pain, SOB, chest pain, nausea/vomiting, and dizziness. C/o intermittent headache. Incontinent of urine, external cath applied, no BM overnight. On pureed diet w/ mild thick liquids. Plan for radiation therapy today.    Problem: Risk for Delirium  Goal: Improved Attention and Thought Clarity  Outcome: Not Progressing  Intervention: Maximize Cognitive Function  Recent Flowsheet Documentation  Taken 5/20/2025 0400 by Yrn Villeda RN  Sensory Stimulation Regulation:   care clustered   lighting decreased  Reorientation Measures:   calendar in view   clock in view  Taken 5/20/2025 0000 by Yrn Villeda RN  Sensory Stimulation Regulation:   care clustered   lighting decreased  Reorientation Measures:   calendar in view   clock in view  Taken 5/19/2025 1936 by Yrn Villeda RN  Sensory Stimulation Regulation:   care clustered   lighting decreased  Reorientation Measures:   calendar in view   clock in view     Problem: Risk for Delirium  Goal: Improved Sleep  Outcome: Progressing     Problem: Craniotomy/Craniectomy/Cranioplasty  Goal: Fluid and Electrolyte Balance  Outcome: Progressing  Goal: Absence of Infection Signs and Symptoms  Outcome: Progressing     Problem: Pain Acute  Goal: Optimal Pain Control and Function  Outcome: Progressing  Intervention: Optimize Psychosocial Wellbeing  Recent Flowsheet Documentation  Taken 5/20/2025 0400 by Yrn Villeda RN  Supportive Measures: active listening utilized  Taken 5/20/2025 0000 by Yrn Villeda RN  Supportive Measures: active listening utilized  Taken 5/19/2025 1936 by Yrn Villeda RN  Supportive Measures: active listening  utilized  Intervention: Prevent or Manage Pain  Recent Flowsheet Documentation  Taken 5/20/2025 0400 by Yrn Villeda RN  Sensory Stimulation Regulation:   care clustered   lighting decreased  Medication Review/Management: medications reviewed  Taken 5/20/2025 0000 by Yrn Villeda RN  Sensory Stimulation Regulation:   care clustered   lighting decreased  Medication Review/Management: medications reviewed  Taken 5/19/2025 1936 by Yrn Villeda RN  Sensory Stimulation Regulation:   care clustered   lighting decreased  Medication Review/Management: medications reviewed

## 2025-05-20 NOTE — PLAN OF CARE
Pt here with IPH. A&O x3-4, off on exact date at times, knows month and year, forgetful, "[employer]. Neuros with L droop, LUE 3/5 JAVI 3/5. Tele A fib CVR. Pureed diet, mildly thick liquids. Takes pills whole one at a time in applesauce. Up with A2 GB SS. Denies pain. Pt scoring green on the Aggression Stop Light Tool. Plan radiation today treatment 2 of 5.  Orders to transfuse platelets if platelet count <75 K; AM platelet count 82 K. CBC and platelet recheck ordered for 4pm.

## 2025-05-20 NOTE — PROGRESS NOTES
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 Admitted to ICU with hospital medicine as primary.         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 Discharged to rehab 5/4, then with gradual decline starting 5/9, increased weakness and somnolence with CT head with increased R temporal lobe hemorrahgic mass and new left intraventricular hemorrhage, transferred to [facility] ICU.  *5/19 noted to have worsening confusion, garbled speech, and L facial droop. Repeat CTH shows increased size of R temporal region parenchymal hemorrhage with similar surrounding vasogenic edema and mass effect/midline shift and a new acute hemorrhage in the posterior aspect of the right lateral ventricle suspicious for acute choroid plexus hemorrhage or intraparenchymal hemorrhage extension. CTA head/neck negative for large vessel occlusion, high grade  stenosis, or acute dissection.  Neurosurgery following  Neuro checks q4H  If there is any worsening in mental status or neuro exam, obtain stat CT head and call neurosurgery team. They would consider doing redo craniotomy.  Platelet transfusions for goal >75k. Recheck this afternoon.  SBP goal <150, prns available  - Evaluated by SLP and diet advanced to pureed level 4 and mildly thick level 2 liquids  - Keppra dose reduced to 1000mg BID on 5/17/25 due to somnolence and ongoing Thrombocytopenia   - Hold DVT prophylaxis  - Steroids not indicated per neurosurgery  - HOB 30 degrees   - Seizure precautions  - PT/OT/SLP consultations requested. PT recommending TCU at discharge    Pancytopenia  Updated by the bedside RN that patient had a fever of 101.3 on 5/18 evening  Pt is leukopenic with WBC count of 1.9  UA was already ordered for an episode of pink urine with possible hematuria  Chest x-ray stat 2 views ordered, blood cultures x 2 ordered  Started on IV cefepime for possible neutropenic fever on 5/18/2025  Tylenol as needed for fever ordered  CBC with differential ordered for a.m.  Neutropenic precautions ordered  [place] oncology reconsultation requested  Addendum;  UA returned negative with less than 1 WBC, less than 1 RBC, nitrates negative leukocyte esterase negative  Chest x-ray also returned negative for pneumonia  - Initially started on cefepime for concern for neutropenic fever. Pt not neutropenic and no clear source of infection so monitor off antibiotics for now.     High grade neuroendocrine tumor, large cell type, left parotid gland s/p chemoradiation   Hx of neuroendocrine tumor as above, [facility]Dr. Butler), previously felt to be in remission.  Evaluated by radiation oncology during previous admission, given limited disease extent had recommended postoperative stereotactic radiation therapy to surgical cavity in 5 fractions, they had planned to repeat imaging 2 to 3 weeks after  discharge and initiate treatment about 1 week later.  - Heme/onc consulted and are following  - Radiation oncology started radiation therapy inpatient from 5/19/2025 for 5 days. Going for next treatment later today.     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 Transfusion goal >75 with active intracranial bleed. Received platelets x2 5/12- recheck level of 76 obtained prior to 2nd transfusion being complete.  - Transfuse PRN to >75, consented  - Monitor platelets closely  - heme/onc consulted. Work up for thrombocytopenia was negative so far.     Atrial fibrillation with slow ventricular response  Previous admission had ongoing asymptomatic bradycardia without evidence of higher degree block, remained in A-fib. Intermittently bradycardic to 50s without mental status changes or hypertension here, less likely Cushing's reflex. EKG appears consistent with afib with slow ventricular response.   - Monitor closely  - Hold apixaban until okay with neurosurgery  Heart rate currently stable in the 50s.  Patient is asymptomatic     Moderate oral and pharyngeal dysphagia  Noted on previous admission, evaluated by SLP and nutrition, diet has been advanced to regular with thin liquids   - Diet per speech pathology      Coronary artery disease status post CABG  LVH  Postoperative hypotension after recent crani , CODE BLUE activated  HTN   Of note, had hypotensive episode following extubation in the PACU to the point that he required code blue and CPR.    BP in the 140s started on low dose lisinopril 5mg po daily on 5/17/25. 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 Creatinine stable.     Diabetes mellitus, type 2, on long term insulin   HgbA1c 7.9% 4/2025. Discharged with glargine 20 units BID with sliding scale, rehab decreased to 20 units daily. Did have issues with hypoglycemia previous  admission.  Restarted Lantus at a lower dose 15 units daily on admission will increase to 20 units  daily on 5/14/2025  Medium dose sliding scale ordered increase sliding scale to high dose on 5/17/2025     Hyperlipidemia  - Continue PTA atorvastatin, fenofibrate      Hypothyroidism  - Continue PTA levothyroxine              Diet: Room Service  Combination Diet Pureed Diet (level 4); Mildly Thick (level 2) (direct assist, tsp only, verify/cue to swallow, alternate between bites/sips; HOLD if any difficulty/decline)    DVT Prophylaxis: hold, SCDs ok  Duran Catheter: Not present  Lines: None     Cardiac Monitoring: None  Code Status: Full Code      Clinically Significant Risk Factors                 # Thrombocytopenia: Lowest platelets = 68 in last 2 days, will monitor for bleeding   # Hypertension: Noted on problem list             # DMII: A1C = 7.9 % (Ref range: <5.7 %) within past 6 months         # Financial/Environmental Concerns: none         Social Drivers of Health    Tobacco Use: Medium Risk (2/17/2025)    Patient History     Smoking Tobacco Use: Former     Smokeless Tobacco Use: Never   Physical Activity: Insufficiently Active (2/9/2025)    Exercise Vital Sign     Days of Exercise per Week: 7 days     Minutes of Exercise per Session: 20 min   Social Connections: Unknown (2/9/2025)    Social Connection and Isolation Panel [NHANES]     Frequency of Social Gatherings with Friends and Family: Three times a week          Disposition Plan       Medically Ready for Discharge: Anticipated in 5+ Days pending stabilization of ICH, radiation therapy, TCU placement      Reese Dixon MD  Hospitalist Service  [facility]  Securely message with [email or web address] (more info)  Text page via [employer] Paging/Directory   ______________________________________________________________________    Interval History   Patient is still sleepy but wakes to voice.  Speech is easier to understand today compared to  yesterday, less garbled.  He is cooperative and conversant with me.  States that he no longer has right eye pain, continues to deny loss of vision, reduced visual field, blurry vision, or any other vision concerns.  He also denies headache, nausea, vomiting, and pain.      Physical Exam   Vital Signs: Temp: 97.4  F (36.3  C) Temp src: Axillary BP: 104/43 Pulse: 68   Resp: 16 SpO2: 98 % O2 Device: None (Room air)    Weight: 172 lbs 2.87 oz  Constitutional: Awake, alert, cooperative, no apparent distress.    Pulmonary: No increased work of breathing, good air exchange, clear to auscultation bilaterally, no crackles or wheezing.  Cardiovascular: Regular rate and rhythm, normal S1 and S2, no S3 or S4. No murmurs, rubs, or gallops noted.  GI: Normal bowel sounds, soft, non-distended, non-tender.  No guarding or rebound.  Skin/Integumen: No cyanosis or jaundice. No rashes noted.  Extremities: No lower extremity edema.  Neuro: A&Ox4. Conversant. Responds appropriately in conversation. Voice slurred but much less garbled than yesterday. 5/5 strength in RUE/RLE. 3/5 strength in LUE/LLE. Minimal L facial droop.       Medical Decision Making       42 MINUTES SPENT BY ME on the date of service doing chart review, history, exam, documentation & further activities per the note.      Data     I have personally reviewed the following data over the past 24 hrs:    2.6 (L)  \   9.4 (L)   / 82 (L)     137 102 11.3 /  166 (H)   4.1 26 0.94 \     ALT: 9 AST: 14 AP: 107 TBILI: 0.8   ALB: 3.5 TOT PROTEIN: 5.7 (L) LIPASE: N/A     Procal: N/A CRP: 4.32 Lactic Acid: N/A       INR:  1.13 PTT:  27   D-dimer:  N/A Fibrinogen:  387       Imaging results reviewed over the past 24 hrs:   Recent Results (from the past 24 hours)   CT Head w/o Contrast    Narrative    EXAM: CT HEAD W/O CONTRAST  LOCATION: [facility]  DATE: 5/19/2025    INDICATION: follow up stability scan for IPH  COMPARISON: CT head 5/19/2025 performed at  0944 hours, brain MRI 5/13/2025.  TECHNIQUE: Routine CT Head without IV contrast. Multiplanar reformats. Dose reduction techniques were used.    FINDINGS:   INTRACRANIAL CONTENTS: Stable postsurgical changes of right frontotemporal cranioplasty with underlying resection cavity. No significant change in 5.7 x 5.1 x 4.4 cm hemorrhagic right temporal lobe mass with surrounding vasogenic edema and regional mass   effect resulting in effacement of the right lateral ventricle and 12 mm leftward midline shift. Slightly increased moderate volume acute hyperdense intraventricular hemorrhage within the lateral ventricles. Patent basal cisterns. The ventricles are not   enlarged out of proportion to the cerebral sulci. No herniation or hydrocephalus. Stable 5 mm maximal thickness extra-axial fluid collection underlying the craniotomy defect. No evidence of acute infarction. Stable mild generalized brain parenchymal   volume loss.      VISUALIZED ORBITS/SINUSES/MASTOIDS: Prior bilateral cataract surgery. Visualized portions of the orbits are otherwise unremarkable. No paranasal sinus mucosal disease. No middle ear or mastoid effusion.    BONES/SOFT TISSUES: No acute abnormality.      Impression    IMPRESSION:  1.  Slightly increased moderate volume acute intraventricular hemorrhage within the lateral ventricles.  2.  Large ruptured acute hemorrhagic right temporal lobe mass with surrounding vasogenic edema and regional mass effect resulting and 12 mm leftward midline shift, not significantly changed since prior exam. No herniation or hydrocephalus.  3.  Stable thin extra axial fluid collection underlying the right frontotemporal cranioplasty defect.

## 2025-05-20 NOTE — PROGRESS NOTES
Neurosurgery Progress Note:     84M s/p right frontotemporal craniotomy with resection of tumor on 4/22/2025. 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   Patient had gradual decline at ARU, head CT showed hemorrhage within the residual tumor, platelet count was 49K. Patient was admitted at [facility] for further management.     24 hours events: No events overnight.  Exam stable.     On exam: Patient seen this morning lying in bed eating breakfast feeding himself.  Moving all extremities.  Left upper extremity 4 out of 5 strength all other extremities 5 out of 5.  Pronator drift with left upper extremity.  Does not endorse any radicular symptoms or paresthesias.  Cranial nerves intact.  Patient is slow to respond but is oriented x 3.   Mild dysarthria      PLAN:  #ICH  - No surgical intervention planned at this time  - If patient's exam was to deteriorate we would repeat imaging and possibly take patient to OR at that time  - Every 4 neurochecks  - Platelet goal greater than 75K     Red Gao DNP  [facility] Neurosurgery  [facility]  Vocera messaging strongly preferred  Please always look up on-call provider before messaging/paging      Dre Jackson Disclosure   This was created at least in part with a voice recognition software. Mistakes/typos may be present.

## 2025-05-20 NOTE — PROGRESS NOTES
[facility]    Vascular Neurology Progress Note    Interval History     He was given platelet transfusion and platelets improved to 95K, drifted down again to 82 K today. Repeat CTH had shown some increased IVH.    He was a bit drowsy early this AM on exam but still able to follow commands and no significant change on exam from yesterday.  Neurosurgery indicates no plan for acute surgical intervention at this time.    No obvious or reported seizure-like activity noted overnight or on exam today.    Hospital Course     Chief complaint: No chief complaint on file.    Pascual Perea is a 84 year old male with pertinent past medical history of atrial fibrillation on Eliquis prior to last admission, CAD s/p CABG, HTN, high-grade neuroendocrine carcinoma large cell type involving Left parotid gland and left cervical lymph nodes s/p radiation/chemotherapy last fall. 4/16 large R temporal mass with hemorrhage and edema s/p R frontotemporal craniotomy with partial neoplasm excision, post op cardiopulmonary arrest and seizures (started on Keppra), thrombocytopenia, discharged to ARU 5/4/25 without resuming eliquis     Readmitted 5/12 from ARU due to acute on chronic R IPH from known bleeding R metastatic mass in setting of worsening thrombocytopenia. He had remained stable and had started radiation treatment with oncology.     5/19/25 stroke code called for worsening L face/arm/leg weakness, also noted L visual field deficit. He had just started on Heparin subcutaneous VTE ppx 5/17 2100. Platelets had decreased to 68K. Repeat CTH showed new ventricular hemorrhage versus IPH extension.    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 with new acute lateral IVH  Likely contributed by worsening thrombocytopenia and recently started VTE prophylaxis  # Vasogenic edema 2/2 above  -appreciate neurosurgery and oncology expertise  -Neuro checks and vitals  "every 1 hours or per neurosurgery  -maintain platelet goal as outlined per heme/onc and neurosurgery  -SBP goal <150, avoid hypotension  -PT/OT/SPT   -Seizure precautions  -Continue Keppra 1 g every 12 hours, monitor for any seizure-like activity, page general neurology if any concern for seizure and get EEG  -will defer to NSGY and oncology if any need for repeat MRI  -Hold all antiplatelets/anticoagulants/NSAIDs, and pharmacologic VTE prophylaxis  -Elevate head of bed 30 degrees  -euglycemia, eunatremia, euthermia    DVT Prophylaxis: SCDs, pharmacologic VTE ppx only when cleared by neurosurgery     Patient Follow-up    - in the next 1 week(s) with PCP  -follow-up as recommended at last discharge with general neurology   -follow-up per neurosurgery and heme/onc    No further stroke evaluation is recommended, so we will sign off. Please contact us with any additional questions.      Fatou Brantley PA-C  Vascular Neurology    To page me or covering stroke neurology team member, click here: AMCOM  Choose \"On Call\" tab at top, then select \"NEUROLOGY/ALL SITES\" from middle drop-down box, press Enter, then look for \"stroke\" or \"telestroke\" for your site.    Physical Examination     Temp: 97.8  F (36.6  C) Temp src: Axillary BP: 107/41 Pulse: 54   Resp: 19 SpO2: 97 % O2 Device: None (Room air)      General Exam  General:  patient sitting in recliner without acute distress    HEENT:  R craniotomy large incision present  Pulmonary:  no respiratory distress on nasal cannula    Neuro Exam  Mental Status: drowsy but arousable with minor verbal stimulation to respond and participate in exam, answers his name, age, incorrect month, follows commands, speech moderately dysarthric but fluent, naming/repetition normal  Cranial Nerves: Not blink to threat on the left, right eye ptosis, no gaze deviation noted, left facial droop, hearing not formally tested but intact conversation,   Motor: No effort against gravity to left lower " extremity, some effort against gravity to left upper extremity, no drift right arm/leg   Reflexes:  toes down-going  Sensory: Decreased sensation to the left face/arm/leg, no extinction on double simultaneous stimulation   Coordination: No incoordination with finger-to-nose or heel-to-shin testing to right arm/leg, unable to perform during the left side due to weakness   Station/Gait:  deferred    Stroke Scales       ICH Score (at arrival)  Scoring Tool Score   Age >= 80 years 1 point Yes   GCS score  3-4   5-12   13-15   2 point  1 point  0 point GCS 13-15   Hematoma volume, cm 3 >= 30 1 point Yes   Intraventricular extension 1 point Yes   Infratentorial location 1 point No   Total 3       Imaging/Labs   (Bolded imaging and labs new and/or personally reviewed or re-reviewed by me today)    MRI/Head CT CTH 5/19/25: large ruptured R temporal mass with vasogenic edema and regional mass effect with 12 mm leftward midline shift (edema not significantly changed), stable thin extra axial fluid collection underlying R frontotemporal cranioplasty defect, slightly increased moderate volume IVH within lateral ventricles      MRI brain tumor protocol: 3.7 x 4.7 hemorrhagic enhancing lesion R temporal lobe, surrounding vasogenic edema involving most of R temporal lobe and R temporo-occipital region, appear more heterogeneous and less solidly enhancing compared to prior MRI 4/23/25, persistent mass effect with partial effacement R lateral ventricle and third ventricle, no significant change in 5.5 mm Leftward midline shift at lateral ventricles, stable mild medialization of R uncus, 6.7 mm FLAIR hyperintense extra-axial fluid deep to R sided craniotomy (prior 3.4 mm), mild dural thickening and enhancement deep to R sided craniotomy     CTH 5/12/25. Increased size R temporal hemorrhagic mass concerning for acute hemorrhage within mass, new LVH, increased Lward midline shift 5 mm, slight increased thin mized density extra-axial  collection underlying the R craniotomy      EKG/Telemetry Wide QRS rhythm   Left ventricular hypertrophy with QRS widening and repolarization abnormality   Cannot rule out Inferior infarct , age undetermined         LDL  No lab value available in past 30 days   A1C  4/23/2025: 7.9 %   Troponin 5/19/2025: 40 ng/L     Other labs reviewed by me today:  CBC, CMP, CRP, INR, APTT, fibrinogen activation    Time Spent on this Encounter   Billing: I have personally spent a total of 35 minutes providing care today, time spent in reviewing medical records and devising the plan as recorded above.

## 2025-05-21 ENCOUNTER — APPOINTMENT (OUTPATIENT)
Dept: SPEECH THERAPY | Facility: CLINIC | Age: 85
DRG: 064 | End: 2025-05-21
Attending: INTERNAL MEDICINE
Payer: COMMERCIAL

## 2025-05-21 ENCOUNTER — APPOINTMENT (OUTPATIENT)
Dept: OCCUPATIONAL THERAPY | Facility: CLINIC | Age: 85
DRG: 064 | End: 2025-05-21
Attending: INTERNAL MEDICINE
Payer: COMMERCIAL

## 2025-05-21 ENCOUNTER — APPOINTMENT (OUTPATIENT)
Dept: PHYSICAL THERAPY | Facility: CLINIC | Age: 85
DRG: 064 | End: 2025-05-21
Attending: INTERNAL MEDICINE
Payer: COMMERCIAL

## 2025-05-21 LAB
ATRIAL RATE - MUSE: 65 BPM
BASOPHILS # BLD AUTO: 0 10E3/UL (ref 0–0.2)
BASOPHILS NFR BLD AUTO: 0 %
DIASTOLIC BLOOD PRESSURE - MUSE: NORMAL MMHG
EOSINOPHIL # BLD AUTO: 0 10E3/UL (ref 0–0.7)
EOSINOPHIL NFR BLD AUTO: 0 %
ERYTHROCYTE [DISTWIDTH] IN BLOOD BY AUTOMATED COUNT: 15.9 % (ref 10–15)
GLUCOSE BLDC GLUCOMTR-MCNC: 238 MG/DL (ref 70–99)
GLUCOSE BLDC GLUCOMTR-MCNC: 249 MG/DL (ref 70–99)
GLUCOSE BLDC GLUCOMTR-MCNC: 264 MG/DL (ref 70–99)
GLUCOSE BLDC GLUCOMTR-MCNC: 290 MG/DL (ref 70–99)
GLUCOSE BLDC GLUCOMTR-MCNC: 334 MG/DL (ref 70–99)
HCT VFR BLD AUTO: 27.9 % (ref 40–53)
HGB BLD-MCNC: 9.1 G/DL (ref 13.3–17.7)
IMM GRANULOCYTES # BLD: 0 10E3/UL
IMM GRANULOCYTES NFR BLD: 0 %
INTERPRETATION ECG - MUSE: NORMAL
LYMPHOCYTES # BLD AUTO: 0.4 10E3/UL (ref 0.8–5.3)
LYMPHOCYTES NFR BLD AUTO: 8 %
MCH RBC QN AUTO: 29.3 PG (ref 26.5–33)
MCHC RBC AUTO-ENTMCNC: 32.6 G/DL (ref 31.5–36.5)
MCV RBC AUTO: 90 FL (ref 78–100)
MONOCYTES # BLD AUTO: 0.2 10E3/UL (ref 0–1.3)
MONOCYTES NFR BLD AUTO: 3 %
NEUTROPHILS # BLD AUTO: 4 10E3/UL (ref 1.6–8.3)
NEUTROPHILS NFR BLD AUTO: 88 %
NRBC # BLD AUTO: 0 10E3/UL
NRBC BLD AUTO-RTO: 0 /100
P AXIS - MUSE: NORMAL DEGREES
PLATELET # BLD AUTO: 87 10E3/UL (ref 150–450)
PR INTERVAL - MUSE: NORMAL MS
QRS DURATION - MUSE: 130 MS
QT - MUSE: 488 MS
QTC - MUSE: 488 MS
R AXIS - MUSE: 81 DEGREES
RBC # BLD AUTO: 3.11 10E6/UL (ref 4.4–5.9)
SYSTOLIC BLOOD PRESSURE - MUSE: NORMAL MMHG
T AXIS - MUSE: 230 DEGREES
VENTRICULAR RATE- MUSE: 60 BPM
WBC # BLD AUTO: 4.6 10E3/UL (ref 4–11)

## 2025-05-21 PROCEDURE — 250N000013 HC RX MED GY IP 250 OP 250 PS 637: Performed by: STUDENT IN AN ORGANIZED HEALTH CARE EDUCATION/TRAINING PROGRAM

## 2025-05-21 PROCEDURE — 99232 SBSQ HOSP IP/OBS MODERATE 35: CPT | Performed by: STUDENT IN AN ORGANIZED HEALTH CARE EDUCATION/TRAINING PROGRAM

## 2025-05-21 PROCEDURE — 120N000001 HC R&B MED SURG/OB

## 2025-05-21 PROCEDURE — 97530 THERAPEUTIC ACTIVITIES: CPT | Mod: GO

## 2025-05-21 PROCEDURE — 85025 COMPLETE CBC W/AUTO DIFF WBC: CPT | Performed by: INTERNAL MEDICINE

## 2025-05-21 PROCEDURE — 36415 COLL VENOUS BLD VENIPUNCTURE: CPT | Performed by: INTERNAL MEDICINE

## 2025-05-21 PROCEDURE — 97116 GAIT TRAINING THERAPY: CPT | Mod: GP

## 2025-05-21 PROCEDURE — 97535 SELF CARE MNGMENT TRAINING: CPT | Mod: GO

## 2025-05-21 PROCEDURE — 92526 ORAL FUNCTION THERAPY: CPT | Mod: GN

## 2025-05-21 PROCEDURE — 250N000012 HC RX MED GY IP 250 OP 636 PS 637: Performed by: NURSE PRACTITIONER

## 2025-05-21 PROCEDURE — 85018 HEMOGLOBIN: CPT | Performed by: INTERNAL MEDICINE

## 2025-05-21 PROCEDURE — 250N000012 HC RX MED GY IP 250 OP 636 PS 637: Performed by: INTERNAL MEDICINE

## 2025-05-21 PROCEDURE — 97530 THERAPEUTIC ACTIVITIES: CPT | Mod: GP

## 2025-05-21 PROCEDURE — 250N000013 HC RX MED GY IP 250 OP 250 PS 637: Performed by: INTERNAL MEDICINE

## 2025-05-21 RX ORDER — DEXAMETHASONE 2 MG/1
2 TABLET ORAL DAILY
Status: DISCONTINUED | OUTPATIENT
Start: 2025-06-05 | End: 2025-05-25 | Stop reason: HOSPADM

## 2025-05-21 RX ORDER — DEXAMETHASONE 4 MG/1
4 TABLET ORAL 2 TIMES DAILY
Status: DISCONTINUED | OUTPATIENT
Start: 2025-05-21 | End: 2025-05-25 | Stop reason: HOSPADM

## 2025-05-21 RX ORDER — DEXAMETHASONE 4 MG/1
4 TABLET ORAL DAILY
Status: DISCONTINUED | OUTPATIENT
Start: 2025-05-29 | End: 2025-05-25 | Stop reason: HOSPADM

## 2025-05-21 RX ORDER — DEXAMETHASONE 1 MG
1 TABLET ORAL DAILY
Status: DISCONTINUED | OUTPATIENT
Start: 2025-06-09 | End: 2025-05-25 | Stop reason: HOSPADM

## 2025-05-21 RX ADMIN — LISINOPRIL 5 MG: 2.5 TABLET ORAL at 08:14

## 2025-05-21 RX ADMIN — AZELASTINE HYDROCHLORIDE 2 SPRAY: 137 SPRAY, METERED NASAL at 08:50

## 2025-05-21 RX ADMIN — LEVOTHYROXINE SODIUM 125 MCG: 125 TABLET ORAL at 08:14

## 2025-05-21 RX ADMIN — LEVETIRACETAM 1000 MG: 500 TABLET, FILM COATED ORAL at 08:14

## 2025-05-21 RX ADMIN — DEXAMETHASONE 4 MG: 4 TABLET ORAL at 08:13

## 2025-05-21 RX ADMIN — ATORVASTATIN CALCIUM 40 MG: 40 TABLET, FILM COATED ORAL at 08:13

## 2025-05-21 RX ADMIN — INSULIN ASPART 8 UNITS: 100 INJECTION, SOLUTION INTRAVENOUS; SUBCUTANEOUS at 18:03

## 2025-05-21 RX ADMIN — Medication 1 SPRAY: at 21:49

## 2025-05-21 RX ADMIN — INSULIN ASPART 5 UNITS: 100 INJECTION, SOLUTION INTRAVENOUS; SUBCUTANEOUS at 13:13

## 2025-05-21 RX ADMIN — CARBOXYMETHYLCELLULOSE SODIUM 1 DROP: 5 SOLUTION/ DROPS OPHTHALMIC at 21:48

## 2025-05-21 RX ADMIN — FENOFIBRATE 54 MG: 54 TABLET ORAL at 08:14

## 2025-05-21 RX ADMIN — CARBOXYMETHYLCELLULOSE SODIUM 1 DROP: 5 SOLUTION/ DROPS OPHTHALMIC at 08:14

## 2025-05-21 RX ADMIN — INSULIN ASPART 4 UNITS: 100 INJECTION, SOLUTION INTRAVENOUS; SUBCUTANEOUS at 08:52

## 2025-05-21 RX ADMIN — AZELASTINE HYDROCHLORIDE 2 SPRAY: 137 SPRAY, METERED NASAL at 21:49

## 2025-05-21 RX ADMIN — LEVETIRACETAM 1000 MG: 500 TABLET, FILM COATED ORAL at 21:48

## 2025-05-21 RX ADMIN — DEXAMETHASONE 4 MG: 4 TABLET ORAL at 21:48

## 2025-05-21 ASSESSMENT — ACTIVITIES OF DAILY LIVING (ADL)
ADLS_ACUITY_SCORE: 69
ADLS_ACUITY_SCORE: 70
ADLS_ACUITY_SCORE: 69
ADLS_ACUITY_SCORE: 70
ADLS_ACUITY_SCORE: 69
ADLS_ACUITY_SCORE: 70
ADLS_ACUITY_SCORE: 69
ADLS_ACUITY_SCORE: 70
ADLS_ACUITY_SCORE: 69

## 2025-05-21 NOTE — PROGRESS NOTES
Care Management Follow Up    Length of Stay (days): 9    Expected Discharge Date: 05/24/2025     Concerns to be Addressed: discharge planning     Patient plan of care discussed at interdisciplinary rounds: Yes    Anticipated Discharge Disposition: transitional care   Anticipated Discharge Services: therapies, transport likely     Patient/family educated on Medicare website which has current facility and service quality ratings: yes, with kika Hare   Education Provided on the Discharge Plan: Yes  Patient/Family in Agreement with the Plan: unable to assess    Referrals Placed by CM/SW: not applicable   Private pay costs discussed: Not applicable    Additional Information:  SW met with patient briefly and dropped off a TCU list in the room. Called daughter Ananya to discuss TCU recommendation and discharge planning. She states that the upcoming wedding on Saturday is in [place] so it doesn't seem possible to get the patient to the wedding due to that location. They understand he will need TCU and are in agreement. [facility] would [facility]. [facility]. If patient discharges on Friday, there would be family transport available. If it is over the weekend, all family will be in [place] and he will need medical transport.     Next steps: When appropriate, send TCU referrals to:  [facility]     MARKEL Mike, [facility]   Social Work   [facility]

## 2025-05-21 NOTE — PROGRESS NOTES
[facility]  Neurosurgery Daily Progress Note  Date of Admission:  5/12/2025    Assessment  84M s/p right frontotemporal craniotomy with resection of tumor on 4/22/2025. 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 Patient had gradual decline at ARU, head CT showed hemorrhage within the residual tumor, platelet count was 49K. Patient was admitted at [facility] for further management.     Interval History   Patient reports mild headache today  Neuro exam stable - drowsy but awake and alert to voice, LUE 4/5, slightly slurred speech.  Platelets 87  Rad Onc treatment 3 out of 5 today     Plan   -No surgical intervention planned at this time. If patient's neuro exam worsens, recommend repeat imaging and possible redo craniotomy.   -Monitor neuro exam   -Decadron taper x 3 weeks   -Keppra per Neurology   -Platelet goal greater than 75k, preferably greater than 100k if able  -Hold any blood thinners, aspirin, NSAIDS   -Follow up with Dr. Erickson in 4 weeks with head CT prior to appt   -NSGY will continue to follow   -Appreciate assistance from specialties   -Discharge plan: TCU       Discussed with Dr. Dre Coon, CNP  [facility] Neurosurgery  Clinic phone number: [phone]  Securely message or page via [employer] Secure Chat, [employer], or [employer]     Physical Exam   Temp: 98  F (36.7  C) Temp src: Oral BP: 110/49 Pulse: 52   Resp: 18 SpO2: 98 % O2 Device: None (Room air)    Vitals:    05/19/25 1308   Weight: 78.1 kg (172 lb 2.9 oz)       Mental status:  Drowsy, awake and alert to voice, oriented x 3, following simple commands, slightly slurred speech.   Cranial nerves:  II-XII intact   Motor:    RUE 5/5   RLE 5/5  LUE 4/5  LLE 5/5    Sensation:  Intact to light touch   Coordination:  Slight LUE pronator drift   Incision: CDI

## 2025-05-21 NOTE — PROGRESS NOTES
Pt was present in Radiation Oncology for treatment number 3 of 5 to the right temporal brain. Pt received a dose of 600 cGy using 6FFF Photons for a total dose of 1800 cGy. Patient will return tomorrow, 5/22 for the next treatment.

## 2025-05-21 NOTE — PROGRESS NOTES
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 Admitted to ICU with hospital medicine as primary.     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 Discharged to rehab 5/4, then with gradual decline starting 5/9, increased weakness and somnolence with CT head with increased R temporal lobe hemorrahgic mass and new left intraventricular hemorrhage, transferred to [facility] ICU.  *5/19 noted to have worsening confusion, garbled speech, and L facial droop. Repeat CTH shows increased size of R temporal region parenchymal hemorrhage with similar surrounding vasogenic edema and mass effect/midline shift and a new acute hemorrhage in the posterior aspect of the right lateral ventricle suspicious for acute choroid plexus hemorrhage or intraparenchymal hemorrhage extension. CTA head/neck negative for large vessel occlusion, high grade stenosis, or  acute dissection.  Neurosurgery following  Neuro checks q4H  If there is any worsening in mental status or neuro exam, obtain stat CT head and call neurosurgery team. They would consider doing redo craniotomy.  Platelet transfusions for goal >75k.   SBP goal <150, prns available  - Evaluated by SLP and diet advanced to pureed level 4 and mildly thick level 2 liquids  - Keppra dose reduced to 1000mg BID on 5/17/25 due to somnolence and ongoing Thrombocytopenia   - Hold DVT prophylaxis  - Steroids not indicated per neurosurgery  - HOB 30 degrees   - Seizure precautions  - PT/OT/SLP consultations requested. PT recommending TCU at discharge    Pancytopenia  Updated by the bedside RN that patient had a fever of 101.3 on 5/18 evening  Pt is leukopenic with WBC count of 1.9  UA was already ordered for an episode of pink urine with possible hematuria  Chest x-ray stat 2 views ordered, blood cultures x 2 ordered  Started on IV cefepime for possible neutropenic fever on 5/18/2025  Tylenol as needed for fever ordered  CBC with differential ordered for a.m.  Neutropenic precautions ordered  [facility] oncology reconsultation requested  Addendum;  UA returned negative with less than 1 WBC, less than 1 RBC, nitrates negative leukocyte esterase negative  Chest x-ray also returned negative for pneumonia  - Initially started on cefepime for concern for neutropenic fever. Pt not neutropenic and no clear source of infection so monitor off antibiotics for now.     High grade neuroendocrine tumor, large cell type, left parotid gland s/p chemoradiation   Hx of neuroendocrine tumor as above, follows with [facility] (Dr. Butler), previously felt to be in remission.  Evaluated by radiation oncology during previous admission, given limited disease extent had recommended postoperative stereotactic radiation therapy to surgical cavity in 5 fractions, they had planned to repeat imaging 2 to 3 weeks after discharge and initiate treatment  about 1 week later.  - Heme/onc consulted and are following  - Radiation oncology started radiation therapy inpatient from 5/19/2025 for 5 days. Going for next treatment later today.     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 Transfusion goal >75 with active intracranial bleed. Received platelets x2 5/12- recheck level of 76 obtained prior to 2nd transfusion being complete.  - Transfuse PRN to >75, consented  - Monitor platelets closely  - heme/onc consulted. Work up for thrombocytopenia was negative so far.     Atrial fibrillation with slow ventricular response  Previous admission had ongoing asymptomatic bradycardia without evidence of higher degree block, remained in A-fib. Intermittently bradycardic to 50s without mental status changes or hypertension here, less likely Cushing's reflex. EKG appears consistent with afib with slow ventricular response.   - Monitor closely  - Hold apixaban until okay with neurosurgery  Heart rate currently stable in the 50s.  Patient is asymptomatic     Moderate oral and pharyngeal dysphagia  Noted on previous admission, evaluated by SLP and nutrition, diet has been advanced to regular with thin liquids   - Diet per speech pathology      Coronary artery disease status post CABG  LVH  Postoperative hypotension after recent crani , CODE BLUE activated  HTN   Of note, had hypotensive episode following extubation in the PACU to the point that he required code blue and CPR.    BP in the 140s started on low dose lisinopril 5mg po daily on 5/17/25. 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 Creatinine stable.     Diabetes mellitus, type 2, on long term insulin   HgbA1c 7.9% 4/2025. Discharged with glargine 20 units BID with sliding scale, rehab decreased to 20 units daily. Did have issues with hypoglycemia previous admission.  Restarted Lantus at a lower dose 15  units daily on admission will increase to 20 units  daily on 5/14/2025  Medium dose sliding scale ordered increase sliding scale to high dose on 5/17/2025     Hyperlipidemia  - Continue PTA atorvastatin, fenofibrate      Hypothyroidism  - Continue PTA levothyroxine          Diet: Room Service  Combination Diet Soft and Bite Sized Diet (level 6); Mildly Thick (level 2) (fully alert/upright, no straws, verify/cue to swallow, alternate between bites/sips; HOLD if any difficulty/decline)    DVT Prophylaxis: hold, SCDs ok  Duran Catheter: Not present  Lines: None     Cardiac Monitoring: None  Code Status: Full Code      Clinically Significant Risk Factors                 # Thrombocytopenia: Lowest platelets = 82 in last 2 days, will monitor for bleeding   # Hypertension: Noted on problem list             # DMII: A1C = 7.9 % (Ref range: <5.7 %) within past 6 months         # Financial/Environmental Concerns: none         Social Drivers of Health    Tobacco Use: Medium Risk (2/17/2025)    Patient History     Smoking Tobacco Use: Former     Smokeless Tobacco Use: Never   Physical Activity: Insufficiently Active (2/9/2025)    Exercise Vital Sign     Days of Exercise per Week: 7 days     Minutes of Exercise per Session: 20 min   Social Connections: Unknown (2/9/2025)    Social Connection and Isolation Panel [NHANES]     Frequency of Social Gatherings with Friends and Family: Three times a week          Disposition Plan       Medically Ready for Discharge: Anticipated in 2-4 Days pending neurosurgery clearance and TCU placement      Reese Dixon MD  Hospitalist Service  [facility]  Securely message with Xinrong (more info)  Text page via Mobile [employer] Paging/Directory   ______________________________________________________________________    Interval History   Much more awake today. Walking around with therapy! Interactive and speech much more clear. Patient going down for radiation treatment later  today.    States that he no longer has right eye pain, continues to deny loss of vision, reduced visual field, blurry vision, or any other vision concerns.  He also denies headache, nausea, vomiting, and pain.      Physical Exam   Vital Signs: Temp: 98  F (36.7  C) Temp src: Oral BP: 110/49 Pulse: 52   Resp: 18 SpO2: 98 % O2 Device: None (Room air)    Weight: 172 lbs 2.87 oz  Constitutional: Awake, alert, cooperative, no apparent distress.    Pulmonary: No increased work of breathing, good air exchange, clear to auscultation bilaterally, no crackles or wheezing.  Cardiovascular: Regular rate and rhythm, normal S1 and S2, no S3 or S4. No murmurs, rubs, or gallops noted.  GI: Normal bowel sounds, soft, non-distended, non-tender.  No guarding or rebound.  Skin/Integumen: No cyanosis or jaundice. No rashes noted.  Extremities: No lower extremity edema.  Neuro: A&Ox4. Conversant. Responds appropriately in conversation. Voice much clearer than yesterday. 5/5 strength in RUE/RLE. 3/5 strength in LUE/LLE.      Medical Decision Making       41 MINUTES SPENT BY ME on the date of service doing chart review, history, exam, documentation & further activities per the note.      Data     I have personally reviewed the following data over the past 24 hrs:    4.6  \   9.1 (L)   / 87 (L)     N/A N/A N/A /  264 (H)   N/A N/A N/A \       Imaging results reviewed over the past 24 hrs:   No results found for this or any previous visit (from the past 24 hours).

## 2025-05-21 NOTE — PLAN OF CARE
Goal Outcome Evaluation:      Plan of Care Reviewed With: patient    Overall Patient Progress: no changeOverall Patient Progress: no change         Pt here with [facility] s/p crani for tumor resection on 4/22. A&O x3-4, not always accurate w/ exact date. Neuros: intermittent lethargy, slurred speech, slow to respond to questions/commands, L sided weakness LUE 3/5, RLE 4/5, LLE 2/5, RLE 4/5. VSS on RA. Tele a-fib CVR w/ inverted T waves. Pureed diet, mildly thick liquids. Takes pills whole 1 at a time in applesauce. Up with 1 GBW or 2 w/ sarasteady depending on level of alertness/energy. Denies pain. Pt scoring green on the Aggression Stop Light Tool. Plan TCU. Discharge pending radiation, medical stability, and placement.

## 2025-05-21 NOTE — PLAN OF CARE
Pt here with [facility] s/p crani for tumor resection on 4/22. A&O x3-4, not always accurate w/ exact date. Neuros: more alert today, slurred speech, L sided weakness LUE 4/5, RUE 4/5, LLE 3/5, RLE 4/5. VSS on RA. Tele a-fib CVR. Pureed diet, mildly thick liquids. Takes pills whole 1 at a time with thickened liquids. Up with A2 GB/W vs sarasteady depending on level of alertness/energy. Denies pain. Pt scoring green on the Aggression Stop Light Tool. Plan TCU. Discharge pending radiation and placement. Pt had radiation treatment 3/5 today.

## 2025-05-22 ENCOUNTER — APPOINTMENT (OUTPATIENT)
Dept: OCCUPATIONAL THERAPY | Facility: CLINIC | Age: 85
DRG: 064 | End: 2025-05-22
Attending: INTERNAL MEDICINE
Payer: COMMERCIAL

## 2025-05-22 ENCOUNTER — APPOINTMENT (OUTPATIENT)
Dept: SPEECH THERAPY | Facility: CLINIC | Age: 85
DRG: 064 | End: 2025-05-22
Attending: INTERNAL MEDICINE
Payer: COMMERCIAL

## 2025-05-22 ENCOUNTER — APPOINTMENT (OUTPATIENT)
Dept: PHYSICAL THERAPY | Facility: CLINIC | Age: 85
DRG: 064 | End: 2025-05-22
Attending: INTERNAL MEDICINE
Payer: COMMERCIAL

## 2025-05-22 VITALS
TEMPERATURE: 97.6 F | SYSTOLIC BLOOD PRESSURE: 107 MMHG | WEIGHT: 172.18 LBS | RESPIRATION RATE: 16 BRPM | BODY MASS INDEX: 24.01 KG/M2 | HEART RATE: 62 BPM | DIASTOLIC BLOOD PRESSURE: 45 MMHG | OXYGEN SATURATION: 97 %

## 2025-05-22 LAB
BACTERIA SPEC CULT: NORMAL
BACTERIA SPEC CULT: NORMAL
BASOPHILS # BLD AUTO: 0 10E3/UL (ref 0–0.2)
BASOPHILS NFR BLD AUTO: 0 %
EOSINOPHIL # BLD AUTO: 0 10E3/UL (ref 0–0.7)
EOSINOPHIL NFR BLD AUTO: 0 %
ERYTHROCYTE [DISTWIDTH] IN BLOOD BY AUTOMATED COUNT: 15.9 % (ref 10–15)
GLUCOSE BLDC GLUCOMTR-MCNC: 189 MG/DL (ref 70–99)
GLUCOSE BLDC GLUCOMTR-MCNC: 214 MG/DL (ref 70–99)
GLUCOSE BLDC GLUCOMTR-MCNC: 282 MG/DL (ref 70–99)
GLUCOSE BLDC GLUCOMTR-MCNC: 287 MG/DL (ref 70–99)
GLUCOSE BLDC GLUCOMTR-MCNC: 320 MG/DL (ref 70–99)
HCT VFR BLD AUTO: 28.8 % (ref 40–53)
HGB BLD-MCNC: 9.4 G/DL (ref 13.3–17.7)
IMM GRANULOCYTES # BLD: 0 10E3/UL
IMM GRANULOCYTES NFR BLD: 1 %
LYMPHOCYTES # BLD AUTO: 0.5 10E3/UL (ref 0.8–5.3)
LYMPHOCYTES NFR BLD AUTO: 10 %
MCH RBC QN AUTO: 29.6 PG (ref 26.5–33)
MCHC RBC AUTO-ENTMCNC: 32.6 G/DL (ref 31.5–36.5)
MCV RBC AUTO: 91 FL (ref 78–100)
MONOCYTES # BLD AUTO: 0.2 10E3/UL (ref 0–1.3)
MONOCYTES NFR BLD AUTO: 5 %
NEUTROPHILS # BLD AUTO: 3.9 10E3/UL (ref 1.6–8.3)
NEUTROPHILS NFR BLD AUTO: 84 %
NRBC # BLD AUTO: 0 10E3/UL
NRBC BLD AUTO-RTO: 0 /100
PLATELET # BLD AUTO: 92 10E3/UL (ref 150–450)
RBC # BLD AUTO: 3.18 10E6/UL (ref 4.4–5.9)
WBC # BLD AUTO: 4.6 10E3/UL (ref 4–11)

## 2025-05-22 PROCEDURE — 250N000012 HC RX MED GY IP 250 OP 636 PS 637: Performed by: NURSE PRACTITIONER

## 2025-05-22 PROCEDURE — 99232 SBSQ HOSP IP/OBS MODERATE 35: CPT | Performed by: STUDENT IN AN ORGANIZED HEALTH CARE EDUCATION/TRAINING PROGRAM

## 2025-05-22 PROCEDURE — 250N000013 HC RX MED GY IP 250 OP 250 PS 637: Performed by: INTERNAL MEDICINE

## 2025-05-22 PROCEDURE — 97535 SELF CARE MNGMENT TRAINING: CPT | Mod: GO

## 2025-05-22 PROCEDURE — 250N000013 HC RX MED GY IP 250 OP 250 PS 637: Performed by: STUDENT IN AN ORGANIZED HEALTH CARE EDUCATION/TRAINING PROGRAM

## 2025-05-22 PROCEDURE — 97116 GAIT TRAINING THERAPY: CPT | Mod: GP

## 2025-05-22 PROCEDURE — 36415 COLL VENOUS BLD VENIPUNCTURE: CPT | Performed by: INTERNAL MEDICINE

## 2025-05-22 PROCEDURE — 250N000013 HC RX MED GY IP 250 OP 250 PS 637: Performed by: NURSE PRACTITIONER

## 2025-05-22 PROCEDURE — 85025 COMPLETE CBC W/AUTO DIFF WBC: CPT | Performed by: INTERNAL MEDICINE

## 2025-05-22 PROCEDURE — 97112 NEUROMUSCULAR REEDUCATION: CPT | Mod: GP

## 2025-05-22 PROCEDURE — 120N000001 HC R&B MED SURG/OB

## 2025-05-22 PROCEDURE — 92526 ORAL FUNCTION THERAPY: CPT | Mod: GN | Performed by: SPEECH-LANGUAGE PATHOLOGIST

## 2025-05-22 PROCEDURE — 85014 HEMATOCRIT: CPT | Performed by: INTERNAL MEDICINE

## 2025-05-22 PROCEDURE — 97530 THERAPEUTIC ACTIVITIES: CPT | Mod: GO

## 2025-05-22 RX ADMIN — ACETAMINOPHEN 650 MG: 325 TABLET, FILM COATED ORAL at 12:48

## 2025-05-22 RX ADMIN — AZELASTINE HYDROCHLORIDE 2 SPRAY: 137 SPRAY, METERED NASAL at 09:18

## 2025-05-22 RX ADMIN — LISINOPRIL 5 MG: 2.5 TABLET ORAL at 09:13

## 2025-05-22 RX ADMIN — FENOFIBRATE 54 MG: 54 TABLET ORAL at 09:14

## 2025-05-22 RX ADMIN — DEXAMETHASONE 4 MG: 4 TABLET ORAL at 20:23

## 2025-05-22 RX ADMIN — CARBOXYMETHYLCELLULOSE SODIUM 1 DROP: 5 SOLUTION/ DROPS OPHTHALMIC at 09:17

## 2025-05-22 RX ADMIN — Medication 1 SPRAY: at 20:41

## 2025-05-22 RX ADMIN — INSULIN ASPART 6 UNITS: 100 INJECTION, SOLUTION INTRAVENOUS; SUBCUTANEOUS at 12:50

## 2025-05-22 RX ADMIN — LEVETIRACETAM 1000 MG: 500 TABLET, FILM COATED ORAL at 09:13

## 2025-05-22 RX ADMIN — DEXAMETHASONE 4 MG: 4 TABLET ORAL at 09:13

## 2025-05-22 RX ADMIN — INSULIN ASPART 8 UNITS: 100 INJECTION, SOLUTION INTRAVENOUS; SUBCUTANEOUS at 18:01

## 2025-05-22 RX ADMIN — LEVETIRACETAM 1000 MG: 500 TABLET, FILM COATED ORAL at 20:23

## 2025-05-22 RX ADMIN — INSULIN ASPART 3 UNITS: 100 INJECTION, SOLUTION INTRAVENOUS; SUBCUTANEOUS at 09:13

## 2025-05-22 RX ADMIN — ATORVASTATIN CALCIUM 40 MG: 40 TABLET, FILM COATED ORAL at 09:14

## 2025-05-22 RX ADMIN — AZELASTINE HYDROCHLORIDE 2 SPRAY: 137 SPRAY, METERED NASAL at 20:31

## 2025-05-22 RX ADMIN — CARBOXYMETHYLCELLULOSE SODIUM 1 DROP: 5 SOLUTION/ DROPS OPHTHALMIC at 20:23

## 2025-05-22 RX ADMIN — LEVOTHYROXINE SODIUM 125 MCG: 125 TABLET ORAL at 09:14

## 2025-05-22 ASSESSMENT — ACTIVITIES OF DAILY LIVING (ADL)
ADLS_ACUITY_SCORE: 68
ADLS_ACUITY_SCORE: 68
ADLS_ACUITY_SCORE: 70
ADLS_ACUITY_SCORE: 68
ADLS_ACUITY_SCORE: 67
ADLS_ACUITY_SCORE: 67
ADLS_ACUITY_SCORE: 68
ADLS_ACUITY_SCORE: 67
ADLS_ACUITY_SCORE: 67
ADLS_ACUITY_SCORE: 68
ADLS_ACUITY_SCORE: 68
ADLS_ACUITY_SCORE: 70
ADLS_ACUITY_SCORE: 68
ADLS_ACUITY_SCORE: 67
ADLS_ACUITY_SCORE: 70
ADLS_ACUITY_SCORE: 67
ADLS_ACUITY_SCORE: 67
ADLS_ACUITY_SCORE: 68

## 2025-05-22 NOTE — PROGRESS NOTES
Care Management Follow Up    Length of Stay (days): 10    Expected Discharge Date: 05/25/2025     Concerns to be Addressed: discharge planning     Patient plan of care discussed at interdisciplinary rounds: Yes    Anticipated Discharge Disposition: Home, Home Care              Anticipated Discharge Services: Home Care, Other (see comment) (pending therapy recommendations)  Anticipated Discharge DME: Other (see comment) (pending therapy recommendations)    Patient/family educated on Medicare website which has current facility and service quality ratings:    Education Provided on the Discharge Plan: Yes  Patient/Family in Agreement with the Plan: unable to assess    Referrals Placed by CM/SW: Homecare (resent [facility] referral due to re admission)  Private pay costs discussed: Not applicable    Discussed  Partnership in Safe Discharge Planning  document with patient/family: No     Handoff Completed: No, handoff not indicated or clinically appropriate    Additional Information:  Writer received voicemail form Jahaira with [facility] in [facility] stating they do not have any open beds therefore are declining pt.    Next Steps: awaiting medical stability; awaiting accepting TCU.     Nelly Lopez, DIONNAW  Social Work  [facility]

## 2025-05-22 NOTE — PLAN OF CARE
"Goal Outcome Evaluation:  Patient Name: Pascual Perea  MRN: [id number]  Date of Admission: 5/12/2025  Reason for Admission: IPH s/p crani for tumor resection on 4/22  Level of Care: M/S    Vitals:   BP Readings from Last 1 Encounters:   05/22/25 139/64     Pulse Readings from Last 1 Encounters:   05/22/25 56     Wt Readings from Last 1 Encounters:   05/19/25 78.1 kg (172 lb 2.9 oz)     Ht Readings from Last 1 Encounters:   05/04/25 1.803 m (5' 11\")     Estimated body mass index is 24.01 kg/m  as calculated from the following:    Height as of 5/4/25: 1.803 m (5' 11\").    Weight as of this encounter: 78.1 kg (172 lb 2.9 oz).  Temp Readings from Last 1 Encounters:   05/22/25 97.6  F (36.4  C) (Oral)         Assessment    Resp: RA. Ls clr.   Telemetry: Afib CVR.bradycardic at times.   Orientation/Neuro: A/Ox3-4 Forgetful.[place]. slurred speech. L facial droop, left sided weakness. LUE 4/5 RUE 5/5. LLE 3/5, RLE 4/5.   GI/: external cath in place. incontinent at times. Small loose BM x1  Skin/Wounds:R crani incision jenni.WDL.blanchable redness buttocks. Mepi in place.  Scattered bruising.   Lines/Drains: Piv x1 SL.  Activity: up Ax2 GB/W.  Sleep: good    Aggression Stop Light: Green          Patient Care Plan: TCU  "

## 2025-05-22 NOTE — PROGRESS NOTES
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 Pathology was high grade neuroendocrine carcinoma. Pt was discharged to ARU, readmitted on 5/12/2025 for increased somnolence at rehab and found to have new intraparenchymal hemorrhages with increased medline shift with associated thrombocytopenia.  NSGY consulted to assist with management. Pt's neuro exam has continued to improve this hospitalization.     -No surgical intervention planned at this time. If patient's neuro exam worsens, recommend repeat imaging and possible redo craniotomy.   - Continue to monitor neuro exam   -Decadron taper x 3 weeks   -Keppra per Neurology   -Platelet goal greater than 75k, ideally greater than 100k if able   - Plts 92 today  -Continue to hold any blood thinners, aspirin, NSAIDS   -Follow up with Dr. Erickson in 4 weeks with head CT prior to appt   -Appreciate assistance from specialties    - pt continues received rad onc treatment while inpatient   -Discharge plan: TCU plan pending   -NSGY will continue to follow     Plans discussed with Dr. Erickson who was in agreement with plans.     Kiara Buckley PA-C   [facility] Neurosurgery  Clinic phone number: [phone]  Securely message or page via Epic Secure Chat, HubPages, or Gociety     - - - - -     Subjective: Pt denies headache, dizziness, n/v, vision changes, or worsening weakness. Tolerating regular diet.      Objective:   Imaging:   Results for orders placed or performed during the hospital encounter of 05/12/25   CT Head w/o Contrast    Impression    IMPRESSION:  1.  Stable postsurgical changes and  high density blood products associated with a mass lesion in the right temporal lobe, unchanged size from the prior study with associated vasogenic edema. No new hemorrhage findings.   MR Brain w/o & w Contrast    Impression    IMPRESSION:  1.  3.7 x 4.7 cm hemorrhagic enhancing lesion in the right temporal lobe, measured 2.7 x 3 cm and 04/23/2025 and 4 x 5 cm on 04/16/2025 when measured in a similar fashion. The surrounding vasogenic edema involving most of the right temporal lobe as well   as the right temporo-occipital region is not significantly changed compared to 04/23/2023. These enhancing lesion does appear more heterogeneous and less solidly enhancing compared to the most recent prior brain MRI 04/23/2025.  2.  There is persistent associated mass effect with partial effacement of the right lateral ventricle and the third ventricle. No significant change in the 5.5 mm midline shift to the left at the level of the lateral ventricles. Stable mild medialization   of the right uncus.  3.  6.7 mm FLAIR hyperintense extra-axial fluid deep to the right-sided craniotomy, previously 3.4 mm on brain MRI 04/23/2025. Mild dural thickening and enhancement deep to the right-sided craniotomy.  4.  Mild diffuse cerebral parenchymal volume loss. Presumed chronic hypertensive/microvascular ischemic white matter changes.     XR Video Swallow with SLP or OT    Impression    IMPRESSION:   Swallow study with Speech Pathology using multiple barium thicknesses.     Thin: Flash penetration. No aspiration. Residue.    Slightly thick: No penetration or aspiration. Residue.    Semisolid: No penetration or aspiration. Residue.    Regular: No penetration or aspiration. Residue.   CT Head w/o Contrast    Impression    IMPRESSION:  1.  Unchanged large hemorrhagic mass centered within the right anterior temporal lobe with surrounding vasogenic edema and mass effect.  2.  Interval resolution of intraventricular hemorrhage within the left  occipital horn.     XR Chest 2 Views    Impression    IMPRESSION: Previous sternotomy for coronary bypass. Stable borderline cardiomegaly with normal vascularity. No focal consolidation, pneumothorax nor pleural effusion.   CT Head w/o Contrast    Impression    IMPRESSION:   HEAD CT:  1.  Somewhat increased size of right temporal region parenchymal hemorrhage with similar surrounding vasogenic edema and mass effect/midline shift. No hydrocephalus.  2.  New acute hemorrhage in the posterior aspect of the right lateral ventricle suspicious for acute choroid plexus hemorrhage or intraparenchymal hemorrhage extension. Small focus of hemorrhage in the dependent left lateral ventricle as well.    HEAD CTA:   1.  Negative for large vessel occlusion.    NECK CTA:  1.  Negative for high-grade arterial stenosis or acute dissection.        Findings communicated to Fatou Brantley PA-C at 0957 hours and again at 1002 hours on 05/19/2025.    CTA Head Neck with Contrast    Impression    IMPRESSION:   HEAD CT:  1.  Somewhat increased size of right temporal region parenchymal hemorrhage with similar surrounding vasogenic edema and mass effect/midline shift. No hydrocephalus.  2.  New acute hemorrhage in the posterior aspect of the right lateral ventricle suspicious for acute choroid plexus hemorrhage or intraparenchymal hemorrhage extension. Small focus of hemorrhage in the dependent left lateral ventricle as well.    HEAD CTA:   1.  Negative for large vessel occlusion.    NECK CTA:  1.  Negative for high-grade arterial stenosis or acute dissection.        Findings communicated to Fatou Brantley PA-C at 0957 hours and again at 1002 hours on 05/19/2025.    CT Head Perfusion w Contrast    Impression    IMPRESSION: No core infarct is suspected. Perfusion anomalies as above, secondary to existing intraparenchymal hemorrhage and surrounding vasogenic edema.   CT Head w/o Contrast    Impression    IMPRESSION:  1.  Slightly increased  moderate volume acute intraventricular hemorrhage within the lateral ventricles.  2.  Large ruptured acute hemorrhagic right temporal lobe mass with surrounding vasogenic edema and regional mass effect resulting and 12 mm leftward midline shift, not significantly changed since prior exam. No herniation or hydrocephalus.  3.  Stable thin extra axial fluid collection underlying the right frontotemporal cranioplasty defect.     Labs:   - Hgb: 9.5 (9.1)  - Plts: 92 (87)  - WBC: 4.6    Vitals:    05/19/25 1308   Weight: 78.1 kg (172 lb 2.9 oz)     Vital Signs with Ranges  Temp:  [97.6  F (36.4  C)-98.1  F (36.7  C)] 97.8  F (36.6  C)  Pulse:  [56-69] 57  Resp:  [16-18] 16  BP: ()/(45-64) 107/51  SpO2:  [96 %-100 %] 97 %  I/O last 3 completed shifts:  In: 510 [P.O.:510]  Out: 1700 [Urine:1700]    Physical Exam:   General: NAD, sitting up in chair.   HENNT: cranial incision well healed without swelling, redness, warmth, or drainage. atraumatic, normocephalic. EOMi b/l. PERRLA.   Mental status:  Awake. AOx4, speech is fluent, following commands  Cranial nerves:  II-XII intact. Slight L sided facial droop at rest goes away with facial movement  Motor:  Moves all extremities independently.   Strength:   Shoulder Flexion     Right:  5/5   Left:  4+/5  Shoulder Extension Right:  5/5   Left:  4+/5  Biceps                      Right:  5/5   Left:  4+/5  Triceps                     Right:  5/5   Left:  4+/5                            Right:  5/5   Left:  4+/5    Hip flexion                 Right: 5/5  Left:  5/5  Knee extension         Right:  5/5  Left:  5/5  Gastroc Soleus (PF) Right:  5/5  Left:  5/5  Tibialis Ant (DF)        Right:  5/5  Left:  5/5  Sensation: grossly Intact to light touch   Coordination:  Smooth finger to nose testing. Minimal LUE pronator drift.

## 2025-05-22 NOTE — PLAN OF CARE
Pt here with [facility] s/p crani for tumor resection on 4/22. A&O x3-4,. Neuros: intermittent lethargic, slurred speech, slow to respond to questions/commands, L sided weakness LUE 3/5, RLE 4/5, LLE 2/5, RLE 4/5. VSS on RA. Tele a-fib CVR w/ inverted T waves. Pureed diet, mildly thick liquids. Takes pills whole 1 at a time with thickened liquids. Up with A1-2 GB/W. Denies pain. Pt scoring green on the Aggression Stop Light Tool. Plan TCU. Discharge pending radiation and placement.Pt had radiation treatment 3/5 today.

## 2025-05-22 NOTE — PROGRESS NOTES
Pt was present in Radiation Oncology for treatment number 4 of 5 to the right temporal brain. Pt received a dose of 600 cGy using 6FFF Photons for a total dose of 2400 cGy. Patient will return tomorrow, 5/23 for the last treatment.

## 2025-05-22 NOTE — PROGRESS NOTES
15:00 - 19:00  Dayday is AO X 4, VSS, on RA, alert, slurred speech, very mild left side hemiparesis.  Up with 1 FWW/GB, comfortable, good appetite.  Last day of radiation will be tomorrow.  Surgery site is WDL.

## 2025-05-22 NOTE — PROGRESS NOTES
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 Admitted to ICU with hospital medicine as primary.     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 Discharged to rehab 5/4, then with gradual decline starting 5/9, increased weakness and somnolence with CT head with increased R temporal lobe hemorrahgic mass and new left intraventricular hemorrhage, transferred to [facility] ICU.  *5/19 noted to have worsening confusion, garbled speech, and L facial droop. Repeat CTH shows increased size of R temporal region parenchymal hemorrhage with similar surrounding vasogenic edema and mass effect/midline shift and a new acute hemorrhage in the posterior aspect of the right lateral ventricle suspicious for acute choroid plexus hemorrhage or intraparenchymal hemorrhage extension. CTA head/neck negative for large vessel occlusion, high grade stenosis, or  acute dissection.  Neurosurgery following  Neuro checks q4H  If there is any worsening in mental status or neuro exam, obtain stat CT head and call neurosurgery team. They would consider doing redo craniotomy.  Platelet transfusions for goal >75k.   SBP goal <150, prns available  - Evaluated by SLP and diet advanced to pureed level 4 and mildly thick level 2 liquids  - Keppra dose reduced to 1000mg BID on 5/17/25 due to somnolence and ongoing Thrombocytopenia   - Hold DVT prophylaxis  - Steroids not indicated per neurosurgery  - HOB 30 degrees   - Seizure precautions  - PT/OT/SLP consultations requested. PT recommending TCU at discharge    Pancytopenia  Updated by the bedside RN that patient had a fever of 101.3 on 5/18 evening  Pt is leukopenic with WBC count of 1.9  UA was already ordered for an episode of pink urine with possible hematuria  Chest x-ray stat 2 views ordered, blood cultures x 2 ordered  Started on IV cefepime for possible neutropenic fever on 5/18/2025  Tylenol as needed for fever ordered  CBC with differential ordered for a.m.  Neutropenic precautions ordered  [facility] oncology reconsultation requested  Addendum;  UA returned negative with less than 1 WBC, less than 1 RBC, nitrates negative leukocyte esterase negative  Chest x-ray also returned negative for pneumonia  - Initially started on cefepime for concern for neutropenic fever. Pt not neutropenic and no clear source of infection so monitor off antibiotics for now.     High grade neuroendocrine tumor, large cell type, left parotid gland s/p chemoradiation   Hx of neuroendocrine tumor as above, [facility] (Dr. Butler), previously felt to be in remission.  Evaluated by radiation oncology during previous admission, given limited disease extent had recommended postoperative stereotactic radiation therapy to surgical cavity in 5 fractions, they had planned to repeat imaging 2 to 3 weeks after discharge and initiate treatment  about 1 week later.  - Heme/onc consulted and signed off. Plan on seeing patient in a week for count checks and KOJO and then follow up with Dr. Butler in June.  - Radiation oncology started radiation therapy inpatient from 5/19/2025 for 5 days. Going for next treatment later today.     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 Transfusion goal >75 with active intracranial bleed. Received platelets x2 5/12- recheck level of 76 obtained prior to 2nd transfusion being complete.  - Transfuse PRN to >75, consented  - Monitor platelets closely  - heme/onc consulted. Work up for thrombocytopenia was negative.     Atrial fibrillation with slow ventricular response  Previous admission had ongoing asymptomatic bradycardia without evidence of higher degree block, remained in A-fib. Intermittently bradycardic to 50s without mental status changes or hypertension here, less likely Cushing's reflex. EKG appears consistent with afib with slow ventricular response.   - Monitor closely  - Continue to hold apixaban at discharge given rebleeding when he was placed on VTE prophylaxis.  Heart rate currently stable in the 50s.  Patient is asymptomatic     Moderate oral and pharyngeal dysphagia  Noted on previous admission, evaluated by SLP and nutrition, diet has been advanced to regular with thin liquids   - Diet per speech pathology      Coronary artery disease status post CABG  LVH  Postoperative hypotension after recent crani , CODE BLUE activated  HTN   Of note, had hypotensive episode following extubation in the PACU to the point that he required code blue and CPR.    BP in the 140s started on low dose lisinopril 5mg po daily on 5/17/25. 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 Creatinine stable.     Diabetes mellitus, type 2, on long term insulin   HgbA1c 7.9% 4/2025. Discharged with glargine 20 units BID  with sliding scale, rehab decreased to 20 units daily. Did have issues with hypoglycemia previous admission.  - Increase Lantus to 15 units BID  - Medium dose sliding scale ordered increase sliding scale to high dose on 5/17/2025     Hyperlipidemia  - Continue PTA atorvastatin, fenofibrate      Hypothyroidism  - Continue PTA levothyroxine          Diet: Room Service  Combination Diet Soft and Bite Sized Diet (level 6); Mildly Thick (level 2) (fully alert/upright, no straws, verify/cue to swallow, alternate between bites/sips; HOLD if any difficulty/decline)    DVT Prophylaxis: hold pharmacologic prophylaxis, SCDs ok  Duran Catheter: Not present  Lines: None     Cardiac Monitoring: None  Code Status: Full Code      Clinically Significant Risk Factors                 # Thrombocytopenia: Lowest platelets = 87 in last 2 days, will monitor for bleeding   # Hypertension: Noted on problem list             # DMII: A1C = 7.9 % (Ref range: <5.7 %) within past 6 months         # Financial/Environmental Concerns: none         Social Drivers of Health    Tobacco Use: Medium Risk (2/17/2025)    Patient History     Smoking Tobacco Use: Former     Smokeless Tobacco Use: Never   Physical Activity: Insufficiently Active (2/9/2025)    Exercise Vital Sign     Days of Exercise per Week: 7 days     Minutes of Exercise per Session: 20 min   Social Connections: Unknown (2/9/2025)    Social Connection and Isolation Panel [NHANES]     Frequency of Social Gatherings with Friends and Family: Three times a week          Disposition Plan       Medically Ready for Discharge: Anticipated Tomorrow could go as early as tomorrow once he has finished his radiation treatment course (tomorrow is last day of radiation).      Reese Dixon MD  Hospitalist Service  [facility]  Securely message with Copious (more info)  Text page via [employer] Paging/Directory    _________________________________________________________________    Interval History   Continues to have good mental status and is aware of what is going on in the hospital. He is a bit confused on details such as how many radiation treatments he has but much better mental status than earlier in hospitalization. Discussed keeping him in the hospital until he has completed his radiation treatments which he is agreeable to.    States that he no longer has right eye pain, continues to deny loss of vision, reduced visual field, blurry vision, or any other vision concerns.  He also denies headache, nausea, vomiting, and pain.      Physical Exam   Vital Signs: Temp: 98  F (36.7  C) Temp src: Oral BP: 109/60 Pulse: 58   Resp: 16 SpO2: 96 % O2 Device: None (Room air)    Weight: 172 lbs 2.87 oz  Constitutional: Awake, alert, cooperative, no apparent distress.    Pulmonary: No increased work of breathing, good air exchange, clear to auscultation bilaterally, no crackles or wheezing.  Cardiovascular: Regular rate and rhythm, normal S1 and S2, no S3 or S4. No murmurs, rubs, or gallops noted.  GI: Normal bowel sounds, soft, non-distended, non-tender.  No guarding or rebound.  Skin/Integumen: No cyanosis or jaundice. No rashes noted.  Extremities: No lower extremity edema.  Neuro: A&Ox3. Conversant. Responds appropriately in conversation. Voice much clearer than yesterday. 5/5 strength in RUE/RLE. 3/5 strength in LUE/LLE. CN II-XII intact. No facial droop noted on exam.      Medical Decision Making       42 MINUTES SPENT BY ME on the date of service doing chart review, history, exam, documentation & further activities per the note.      Data     I have personally reviewed the following data over the past 24 hrs:    4.6  \   9.4 (L)   / 92 (L)     N/A N/A N/A /  214 (H)   N/A N/A N/A \       Imaging results reviewed over the past 24 hrs:   No results found for this or any previous visit (from the past 24 hours).

## 2025-05-22 NOTE — PLAN OF CARE
Pt here with [facility] s/p crani for tumor resection on 4/22. A&O x4. Neuros: more alert today, slurred speech, L sided weakness LUE 4/5, RUE 5/5, LLE 4/5, RLE 4/5. VSS on RA. Tele a-fib CVR. Soft and bite sized diet, mildly thick liquids. Takes pills whole 1 at a time with thickened liquids. Up with A1-2 GB/W vs sarasteady depending on level of alertness/energy. Denies pain. Pt scoring green on the Aggression Stop Light Tool. Plan TCU. Discharge pending radiation and placement. Pt had radiation treatment 3/5 today.

## 2025-05-23 ENCOUNTER — APPOINTMENT (OUTPATIENT)
Dept: SPEECH THERAPY | Facility: CLINIC | Age: 85
DRG: 064 | End: 2025-05-23
Attending: INTERNAL MEDICINE
Payer: COMMERCIAL

## 2025-05-23 ENCOUNTER — TRANSFERRED RECORDS (OUTPATIENT)
Dept: HEALTH INFORMATION MANAGEMENT | Facility: CLINIC | Age: 85
End: 2025-05-23
Payer: COMMERCIAL

## 2025-05-23 LAB
BACTERIA SPEC CULT: NO GROWTH
BACTERIA SPEC CULT: NO GROWTH
BASOPHILS # BLD AUTO: 0 10E3/UL (ref 0–0.2)
BASOPHILS NFR BLD AUTO: 0 %
EOSINOPHIL # BLD AUTO: 0 10E3/UL (ref 0–0.7)
EOSINOPHIL NFR BLD AUTO: 0 %
ERYTHROCYTE [DISTWIDTH] IN BLOOD BY AUTOMATED COUNT: 16 % (ref 10–15)
GLUCOSE BLDC GLUCOMTR-MCNC: 220 MG/DL (ref 70–99)
GLUCOSE BLDC GLUCOMTR-MCNC: 234 MG/DL (ref 70–99)
GLUCOSE BLDC GLUCOMTR-MCNC: 243 MG/DL (ref 70–99)
GLUCOSE BLDC GLUCOMTR-MCNC: 262 MG/DL (ref 70–99)
GLUCOSE BLDC GLUCOMTR-MCNC: 310 MG/DL (ref 70–99)
GLUCOSE BLDC GLUCOMTR-MCNC: 350 MG/DL (ref 70–99)
HCT VFR BLD AUTO: 26.1 % (ref 40–53)
HGB BLD-MCNC: 8.9 G/DL (ref 13.3–17.7)
HOLD SPECIMEN: NORMAL
IMM GRANULOCYTES # BLD: 0 10E3/UL
IMM GRANULOCYTES NFR BLD: 0 %
LYMPHOCYTES # BLD AUTO: 0.5 10E3/UL (ref 0.8–5.3)
LYMPHOCYTES NFR BLD AUTO: 10 %
MCH RBC QN AUTO: 30.1 PG (ref 26.5–33)
MCHC RBC AUTO-ENTMCNC: 34.1 G/DL (ref 31.5–36.5)
MCV RBC AUTO: 88 FL (ref 78–100)
MONOCYTES # BLD AUTO: 0.2 10E3/UL (ref 0–1.3)
MONOCYTES NFR BLD AUTO: 5 %
NEUTROPHILS # BLD AUTO: 3.9 10E3/UL (ref 1.6–8.3)
NEUTROPHILS NFR BLD AUTO: 85 %
NRBC # BLD AUTO: 0 10E3/UL
NRBC BLD AUTO-RTO: 0 /100
PLATELET # BLD AUTO: 95 10E3/UL (ref 150–450)
RBC # BLD AUTO: 2.96 10E6/UL (ref 4.4–5.9)
WBC # BLD AUTO: 4.6 10E3/UL (ref 4–11)

## 2025-05-23 PROCEDURE — 999N000157 HC STATISTIC RCP TIME EA 10 MIN

## 2025-05-23 PROCEDURE — 99207 PR APP CREDIT; MD BILLING SHARED VISIT: CPT

## 2025-05-23 PROCEDURE — 85025 COMPLETE CBC W/AUTO DIFF WBC: CPT | Performed by: INTERNAL MEDICINE

## 2025-05-23 PROCEDURE — 250N000013 HC RX MED GY IP 250 OP 250 PS 637: Performed by: INTERNAL MEDICINE

## 2025-05-23 PROCEDURE — 99233 SBSQ HOSP IP/OBS HIGH 50: CPT | Performed by: STUDENT IN AN ORGANIZED HEALTH CARE EDUCATION/TRAINING PROGRAM

## 2025-05-23 PROCEDURE — 250N000013 HC RX MED GY IP 250 OP 250 PS 637: Performed by: STUDENT IN AN ORGANIZED HEALTH CARE EDUCATION/TRAINING PROGRAM

## 2025-05-23 PROCEDURE — 92526 ORAL FUNCTION THERAPY: CPT | Mod: GN

## 2025-05-23 PROCEDURE — 120N000001 HC R&B MED SURG/OB

## 2025-05-23 PROCEDURE — 250N000012 HC RX MED GY IP 250 OP 636 PS 637: Performed by: NURSE PRACTITIONER

## 2025-05-23 PROCEDURE — 36415 COLL VENOUS BLD VENIPUNCTURE: CPT | Performed by: INTERNAL MEDICINE

## 2025-05-23 RX ADMIN — ATORVASTATIN CALCIUM 40 MG: 40 TABLET, FILM COATED ORAL at 08:39

## 2025-05-23 RX ADMIN — AZELASTINE HYDROCHLORIDE 2 SPRAY: 137 SPRAY, METERED NASAL at 08:40

## 2025-05-23 RX ADMIN — DEXAMETHASONE 4 MG: 4 TABLET ORAL at 08:39

## 2025-05-23 RX ADMIN — CARBOXYMETHYLCELLULOSE SODIUM 1 DROP: 5 SOLUTION/ DROPS OPHTHALMIC at 21:28

## 2025-05-23 RX ADMIN — INSULIN ASPART 9 UNITS: 100 INJECTION, SOLUTION INTRAVENOUS; SUBCUTANEOUS at 17:28

## 2025-05-23 RX ADMIN — LISINOPRIL 5 MG: 2.5 TABLET ORAL at 08:38

## 2025-05-23 RX ADMIN — FENOFIBRATE 54 MG: 54 TABLET ORAL at 08:38

## 2025-05-23 RX ADMIN — Medication 1 SPRAY: at 21:27

## 2025-05-23 RX ADMIN — AZELASTINE HYDROCHLORIDE 2 SPRAY: 137 SPRAY, METERED NASAL at 21:26

## 2025-05-23 RX ADMIN — INSULIN ASPART 4 UNITS: 100 INJECTION, SOLUTION INTRAVENOUS; SUBCUTANEOUS at 08:40

## 2025-05-23 RX ADMIN — INSULIN ASPART 5 UNITS: 100 INJECTION, SOLUTION INTRAVENOUS; SUBCUTANEOUS at 13:37

## 2025-05-23 RX ADMIN — LEVETIRACETAM 1000 MG: 500 TABLET, FILM COATED ORAL at 08:38

## 2025-05-23 RX ADMIN — LEVETIRACETAM 1000 MG: 500 TABLET, FILM COATED ORAL at 21:29

## 2025-05-23 RX ADMIN — DEXAMETHASONE 4 MG: 4 TABLET ORAL at 21:30

## 2025-05-23 RX ADMIN — CARBOXYMETHYLCELLULOSE SODIUM 1 DROP: 5 SOLUTION/ DROPS OPHTHALMIC at 08:39

## 2025-05-23 RX ADMIN — LEVOTHYROXINE SODIUM 125 MCG: 125 TABLET ORAL at 08:39

## 2025-05-23 ASSESSMENT — ACTIVITIES OF DAILY LIVING (ADL)
ADLS_ACUITY_SCORE: 68
ADLS_ACUITY_SCORE: 70
ADLS_ACUITY_SCORE: 68
ADLS_ACUITY_SCORE: 70
ADLS_ACUITY_SCORE: 68
ADLS_ACUITY_SCORE: 70
ADLS_ACUITY_SCORE: 68
ADLS_ACUITY_SCORE: 70
ADLS_ACUITY_SCORE: 68
ADLS_ACUITY_SCORE: 70
ADLS_ACUITY_SCORE: 68
ADLS_ACUITY_SCORE: 70
ADLS_ACUITY_SCORE: 68
ADLS_ACUITY_SCORE: 68
ADLS_ACUITY_SCORE: 70

## 2025-05-23 NOTE — PROGRESS NOTES
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 Pathology was high grade neuroendocrine carcinoma. Pt was discharged to ARU, readmitted on 5/12/2025 for increased somnolence at rehab and found to have new intraparenchymal hemorrhages with increased medline shift with associated thrombocytopenia.  NSGY consulted to assist with management. Pt's neuro exam has continued to improve this hospitalization.     -No surgical intervention planned at this time. If patient's neuro exam worsens, recommend repeat imaging and possible redo craniotomy.   - Continue to monitor neuro exam   -Decadron taper x 3 weeks   -Keppra per Neurology   -Platelet goal greater than 75k, ideally greater than 100k if able   - Plts 95 today  -Continue to hold any blood thinners, aspirin, NSAIDS   -Follow up with Dr. Erickson in 4 weeks with head CT prior to appt   -Appreciate assistance from specialties    - pt continues received rad onc treatment while inpatient   -Discharge plan: TCU plan pending   -NSGY will continue to follow     Plans discussed with Dr. Erickson who was in agreement with plans.     Kiara Buckley PA-C   [facility] Neurosurgery  Clinic phone number: [phone]  Securely message or page via Epic Secure Chat, [employer], or [employer]ilo     - - - - -     Subjective: Pt denies headache, dizziness, n/v, vision changes, or worsening weakness. Tolerating regular diet. Looking forward to leaving the hospital.    Objective:   Imaging:   Results for orders placed or performed during the hospital encounter of 05/12/25   CT Head w/o Contrast    Impression     IMPRESSION:  1.  Stable postsurgical changes and high density blood products associated with a mass lesion in the right temporal lobe, unchanged size from the prior study with associated vasogenic edema. No new hemorrhage findings.   MR Brain w/o & w Contrast    Impression    IMPRESSION:  1.  3.7 x 4.7 cm hemorrhagic enhancing lesion in the right temporal lobe, measured 2.7 x 3 cm and 04/23/2025 and 4 x 5 cm on 04/16/2025 when measured in a similar fashion. The surrounding vasogenic edema involving most of the right temporal lobe as well   as the right temporo-occipital region is not significantly changed compared to 04/23/2023. These enhancing lesion does appear more heterogeneous and less solidly enhancing compared to the most recent prior brain MRI 04/23/2025.  2.  There is persistent associated mass effect with partial effacement of the right lateral ventricle and the third ventricle. No significant change in the 5.5 mm midline shift to the left at the level of the lateral ventricles. Stable mild medialization   of the right uncus.  3.  6.7 mm FLAIR hyperintense extra-axial fluid deep to the right-sided craniotomy, previously 3.4 mm on brain MRI 04/23/2025. Mild dural thickening and enhancement deep to the right-sided craniotomy.  4.  Mild diffuse cerebral parenchymal volume loss. Presumed chronic hypertensive/microvascular ischemic white matter changes.     XR Video Swallow with SLP or OT    Impression    IMPRESSION:   Swallow study with Speech Pathology using multiple barium thicknesses.     Thin: Flash penetration. No aspiration. Residue.    Slightly thick: No penetration or aspiration. Residue.    Semisolid: No penetration or aspiration. Residue.    Regular: No penetration or aspiration. Residue.   CT Head w/o Contrast    Impression    IMPRESSION:  1.  Unchanged large hemorrhagic mass centered within the right anterior temporal lobe with surrounding vasogenic edema and mass effect.  2.  Interval resolution  of intraventricular hemorrhage within the left occipital horn.     XR Chest 2 Views    Impression    IMPRESSION: Previous sternotomy for coronary bypass. Stable borderline cardiomegaly with normal vascularity. No focal consolidation, pneumothorax nor pleural effusion.   CT Head w/o Contrast    Impression    IMPRESSION:   HEAD CT:  1.  Somewhat increased size of right temporal region parenchymal hemorrhage with similar surrounding vasogenic edema and mass effect/midline shift. No hydrocephalus.  2.  New acute hemorrhage in the posterior aspect of the right lateral ventricle suspicious for acute choroid plexus hemorrhage or intraparenchymal hemorrhage extension. Small focus of hemorrhage in the dependent left lateral ventricle as well.    HEAD CTA:   1.  Negative for large vessel occlusion.    NECK CTA:  1.  Negative for high-grade arterial stenosis or acute dissection.        Findings communicated to Fatou Brantley PA-C at 0957 hours and again at 1002 hours on 05/19/2025.    CTA Head Neck with Contrast    Impression    IMPRESSION:   HEAD CT:  1.  Somewhat increased size of right temporal region parenchymal hemorrhage with similar surrounding vasogenic edema and mass effect/midline shift. No hydrocephalus.  2.  New acute hemorrhage in the posterior aspect of the right lateral ventricle suspicious for acute choroid plexus hemorrhage or intraparenchymal hemorrhage extension. Small focus of hemorrhage in the dependent left lateral ventricle as well.    HEAD CTA:   1.  Negative for large vessel occlusion.    NECK CTA:  1.  Negative for high-grade arterial stenosis or acute dissection.        Findings communicated to Fatou Brantley PA-C at 0957 hours and again at 1002 hours on 05/19/2025.    CT Head Perfusion w Contrast    Impression    IMPRESSION: No core infarct is suspected. Perfusion anomalies as above, secondary to existing intraparenchymal hemorrhage and surrounding vasogenic edema.   CT Head w/o Contrast     Impression    IMPRESSION:  1.  Slightly increased moderate volume acute intraventricular hemorrhage within the lateral ventricles.  2.  Large ruptured acute hemorrhagic right temporal lobe mass with surrounding vasogenic edema and regional mass effect resulting and 12 mm leftward midline shift, not significantly changed since prior exam. No herniation or hydrocephalus.  3.  Stable thin extra axial fluid collection underlying the right frontotemporal cranioplasty defect.     Labs:   - Hgb: 8.9 (9.5)  - Plts: 95 (92)  - WBC: 4.6    Vitals:    05/19/25 1308   Weight: 78.1 kg (172 lb 2.9 oz)     Vital Signs with Ranges  Temp:  [96.9  F (36.1  C)-98  F (36.7  C)] 98  F (36.7  C)  Pulse:  [57-62] 58  Resp:  [16-18] 18  BP: (107-132)/(43-62) 132/59  SpO2:  [93 %-99 %] 99 %  I/O last 3 completed shifts:  In: 970 [P.O.:970]  Out: 1100 [Urine:1100]    Physical Exam:   General: NAD, sitting up in chair. Sleepy.  HENNT: cranial incision well healed without swelling, redness, warmth, or drainage. atraumatic, normocephalic. EOMi b/l. PERRLA.   Mental status:  Awake. AOx4, speech is slightly slurred but coherent (similar to previous exams), following commands  Cranial nerves:  II-XII intact. Slight L sided facial droop at rest goes away with facial movement  Motor:  Moves all extremities independently.   Strength:   Shoulder Flexion     Right:  5/5   Left:  4+/5  Shoulder Extension Right:  5/5   Left:  4+/5  Biceps                      Right:  5/5   Left:  4+/5  Triceps                     Right:  5/5   Left:  4+/5                            Right:  5/5   Left:  4+/5    Hip flexion                 Right: 5/5  Left:  5/5  Knee extension         Right:  5/5  Left:  5/5  Gastroc Soleus (PF) Right:  5/5  Left:  5/5  Tibialis Ant (DF)        Right:  5/5  Left:  5/5  Sensation: grossly Intact to light touch   Coordination:  Dysmetria on LUE. Minimal LUE pronator drift.

## 2025-05-23 NOTE — PLAN OF CARE
Goal Outcome Evaluation:  5/22/2025 [id number]  Pt here with [facility] s/p crani for tumor resection on 4/22.      Readmitted on 5/12/2025 for increased somnolence at rehab and found to have new intraparenchymal hemorrhages   Orientation: AOX3-4, slurred speech, L facial droop , L sided weakness LUE 4/5, RUE 5/5, LLE 4/5, RLE 4/5.   Vitals/Tele: VSS on RA. Tele a-fib CVR.   IV Access/drains: PIV SL  Diet: Soft and bite sized diet, mildly thick liquids. Takes pills whole 1 at a time with thickened liquids.   Mobility: Up with A1-2 GB/W vs sarasteady depending on level of alertness  GI/: continent/incontinent BB, male external in placed at night. Using urinal at bedside at times during the day  Wound/Skin: R crani incision TYRELL CDI  Consults: radiation oncology following, heme/onc consulted sighed off.  NSGY following, no surgery   Discharge Plan: Plan TCU. Discharge pending radiation and placement.   -  treatment number 4 of 5 to the right temporal brain. Patient will return tomorrow, 5/23 for the last treatment.   See Flow sheets for assessment

## 2025-05-23 NOTE — PROGRESS NOTES
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 Admitted to ICU with hospital medicine as primary.     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 Discharged to rehab 5/4, then with gradual decline starting 5/9, increased weakness and somnolence with CT head with increased R temporal lobe hemorrahgic mass and new left intraventricular hemorrhage, transferred to [facility] ICU.  *5/19 noted to have worsening confusion, garbled speech, and L facial droop. Repeat CTH shows increased size of R temporal region parenchymal hemorrhage with similar surrounding vasogenic edema and mass effect/midline shift and a new acute hemorrhage in the posterior aspect of the right lateral ventricle suspicious for acute choroid plexus hemorrhage or intraparenchymal hemorrhage extension. CTA head/neck negative for large vessel occlusion, high grade stenosis, or  acute dissection.  Neurosurgery following  Neuro checks q4H  If there is any worsening in mental status or neuro exam, obtain stat CT head and call neurosurgery team. They would consider doing redo craniotomy.  Platelet transfusions for goal >75k.   SBP goal <150, prns available  - Evaluated by SLP and diet advanced to pureed level 4 and mildly thick level 2 liquids  - Keppra dose reduced to 1000mg BID on 5/17/25 due to somnolence and ongoing Thrombocytopenia   - Hold DVT prophylaxis  - Steroids not indicated per neurosurgery  - HOB 30 degrees   - Seizure precautions  - PT/OT/SLP consultations requested. PT recommending TCU at discharge. Now pending TCU placement/transportation    Pancytopenia  Updated by the bedside RN that patient had a fever of 101.3 on 5/18 evening  Pt is leukopenic with WBC count of 1.9  UA was already ordered for an episode of pink urine with possible hematuria  Chest x-ray stat 2 views ordered, blood cultures x 2 ordered  Started on IV cefepime for possible neutropenic fever on 5/18/2025  Tylenol as needed for fever ordered  CBC with differential ordered for a.m.  Neutropenic precautions ordered  [place] oncology reconsultation requested  Addendum;  UA returned negative with less than 1 WBC, less than 1 RBC, nitrates negative leukocyte esterase negative  Chest x-ray also returned negative for pneumonia  - Initially started on cefepime for concern for neutropenic fever. Pt not neutropenic and no clear source of infection so monitor off antibiotics for now.     High grade neuroendocrine tumor, large cell type, left parotid gland s/p chemoradiation   Hx of neuroendocrine tumor as above, follows with [facility] (Dr. Butler), previously felt to be in remission.  Evaluated by radiation oncology during previous admission, given limited disease extent had recommended postoperative stereotactic radiation therapy to surgical cavity in 5 fractions, they had planned to repeat imaging 2 to 3 weeks  after discharge and initiate treatment about 1 week later.  - Heme/onc consulted and signed off. Plan on seeing patient in a week for count checks and KOJO and then follow up with Dr. Butler in June.  - Radiation oncology started radiation therapy inpatient from 5/19/2025 for 5 days. Completed today, 5/23/2025. Now ready for discharge to TCU.     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 Transfusion goal >75 with active intracranial bleed. Received platelets x2 5/12- recheck level of 76 obtained prior to 2nd transfusion being complete.  - Transfuse PRN to >75, consented  - Monitor platelets closely  - heme/onc consulted. Work up for thrombocytopenia was negative.     Atrial fibrillation with slow ventricular response  Previous admission had ongoing asymptomatic bradycardia without evidence of higher degree block, remained in A-fib. Intermittently bradycardic to 50s without mental status changes or hypertension here, less likely Cushing's reflex. EKG appears consistent with afib with slow ventricular response.   - Monitor closely  - Continue to hold apixaban at discharge given rebleeding when he was placed on VTE prophylaxis. Expressed risk of stroke due to this with patient and family but explained that the significant risk of rebleeding is too high to restart anticoagulation at this time.  Heart rate currently stable in the 50s.  Patient is asymptomatic     Moderate oral and pharyngeal dysphagia  Noted on previous admission, evaluated by SLP and nutrition, diet has been advanced to regular with thin liquids   - Diet per speech pathology      Coronary artery disease status post CABG  LVH  Postoperative hypotension after recent crani , CODE BLUE activated  HTN   Of note, had hypotensive episode following extubation in the PACU to the point that he required code blue and CPR.    BP in the 140s started on low dose lisinopril 5mg po daily on  5/17/25. 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 Creatinine stable.     Diabetes mellitus, type 2, on long term insulin   HgbA1c 7.9% 4/2025. Discharged with glargine 20 units BID with sliding scale, rehab decreased to 20 units daily. Did have issues with hypoglycemia previous admission.  - Increase Lantus to 15 units BID  - Medium dose sliding scale ordered increase sliding scale to high dose on 5/17/2025     Hyperlipidemia  - Continue PTA atorvastatin, fenofibrate      Hypothyroidism  - Continue PTA levothyroxine          Diet: Room Service  Combination Diet Soft and Bite Sized Diet (level 6); Slightly Thick (level 1) (fully alert/upright, no straws, verify/cue to swallow, alternate between bites/sips; HOLD if any difficulty/decline)    DVT Prophylaxis: hold pharmacologic prophylaxis, SCDs ok  Duran Catheter: Not present  Lines: None     Cardiac Monitoring: None  Code Status: Full Code      Clinically Significant Risk Factors                 # Thrombocytopenia: Lowest platelets = 92 in last 2 days, will monitor for bleeding   # Hypertension: Noted on problem list             # DMII: A1C = 7.9 % (Ref range: <5.7 %) within past 6 months         # Financial/Environmental Concerns: none         Social Drivers of Health    Tobacco Use: Medium Risk (2/17/2025)    Patient History     Smoking Tobacco Use: Former     Smokeless Tobacco Use: Never   Physical Activity: Insufficiently Active (2/9/2025)    Exercise Vital Sign     Days of Exercise per Week: 7 days     Minutes of Exercise per Session: 20 min   Social Connections: Unknown (2/9/2025)    Social Connection and Isolation Panel [NHANES]     Frequency of Social Gatherings with Friends and Family: Three times a week          Disposition Plan       Medically Ready for Discharge: Anticipated Today pending TCU placement      Reese Dixon MD  Hospitalist Service  [facility]  Securely message  with Renetta (more info)  Text page via [facility] Paging/Directory   _________________________________________________________________    Interval History   Discussed plan for TCU placement now that patient is done with radiation treatments. Patient understands and agrees with plan.  We discussed that it appears he does have a TCU that accepted him, but we are waiting for them to have a bed and transportation be set up.  Also discussed this with family at bedside.  They all understand and agree with plan.  All questions and concerns answered to best my ability.      Physical Exam   Vital Signs: Temp: 97.8  F (36.6  C) Temp src: Axillary BP: 110/55 Pulse: 58   Resp: 18 SpO2: 98 % O2 Device: None (Room air)    Weight: 172 lbs 2.87 oz  Constitutional: Awake, alert, cooperative, no apparent distress.    Pulmonary: No increased work of breathing, good air exchange, clear to auscultation bilaterally, no crackles or wheezing.  Cardiovascular: Regular rate and rhythm, normal S1 and S2, no S3 or S4. No murmurs, rubs, or gallops noted.  GI: Normal bowel sounds, soft, non-distended, non-tender.  No guarding or rebound.  Skin/Integumen: No cyanosis or jaundice. No rashes noted.  Extremities: No lower extremity edema.  Neuro: A&Ox4. Conversant. Responds appropriately in conversation. Voice much clearer than yesterday. 5/5 strength in RUE/RLE. 3/5 strength in LUE/LLE. CN II-XII intact. No facial droop noted on exam.      Medical Decision Making       44 MINUTES SPENT BY ME on the date of service doing chart review, history, exam, documentation & further activities per the note.      Data     I have personally reviewed the following data over the past 24 hrs:    4.6  \   8.9 (L)   / 95 (L)     N/A N/A N/A /  243 (H)   N/A N/A N/A \       Imaging results reviewed over the past 24 hrs:   No results found for this or any previous visit (from the past 24 hours).

## 2025-05-23 NOTE — PROGRESS NOTES
Reason for Admission: R IPH with shift s/p R Frontotemporal Crani with Resection ()     Cognitive/Mentation: A/Ox 4  Neuros/CMS: Intact ex L droop, [place], slurred speech, generalized weakness RUE , LUE / with drift, BLE   VS: Occasional bradycardia @ 50s, otherwise VSS on RA. SBP <150   Tele: A fib SVR-CVR with BBB and PVCs.  /GI: Continent/Incontinent. On ext cath NOC. Last BM .   Pulmonary: LS clear. Denies SOB/  Pain: Denies on shift. No nonverbal signs of pain      Drains/Lines: R PIV SL  Skin: R crani incision CDI TYRELL, scattered bruising  Activity: Assist x 1 with GBW.  Diet: Mod CHO - Soft and bite-size with mildly thick liquids. Takes pills whole.      Therapies recs: TCU  Discharge: Pending last radiation onco tx     Aggression Stoplight Tool: yellow for impulsivity at times     [id number]  End of shift summary: Fair sleep between cares. Seizure precautions maintained, no seizure activity noted on shift. Neutropenic precautions maintained.

## 2025-05-23 NOTE — PROGRESS NOTES
Pt was present in Radiation Oncology for treatment number 5 of 5 to the right temporal brain. Pt received a dose of 600 cGy using 6FFF Photons for a total dose of 3000 cGy. Patient radiation treatments are complete.

## 2025-05-23 NOTE — PROGRESS NOTES
Care Management Follow Up    Length of Stay (days): 11    Expected Discharge Date: 05/23/2025     Concerns to be Addressed: discharge planning     Patient plan of care discussed at interdisciplinary rounds: Yes    Anticipated Discharge Disposition: TCU  Anticipated Discharge Services: therapies    Referrals Placed by CM/SW: TCU  Private pay costs discussed: Not applicable    Additional Information:  Patient has last radiation treatment today. [facility] and a couple other referrals are pending as well.     ADD: 1015  Patient accepted to [facility], they are waiting to see if the bed is open today vs Sunday. YARELIS called and left voicemail with patient's daughter to update her and start planning discharge.    ADD: 1500  Have not heard from daughter and family has not been in the room so YARELIS called son Harpreet to update him on the plan. He was not sure if family would be around to transport on Sunday so he said it is best to have transport scheduled and maybe cancel it if family are available. [facility] wheelchair is scheduled for a pickup Sunday 5/25 between 2430-3263 to go to [facility] TCU. He does not know if patient wants a shared room or a private room and asked to call SW back in a couple minutes. YARELIS called and confirmed discharge date/time with Shae in admissions, she is agreeable. Family called back to request the private room for $45 a day, they are aware of the discharge plan and time for Sunday.     Weekend Phone: [phone]  Weekend Fax: [phone]     PAS-RR    D: Per DHS regulation, YARELIS completed and submitted PAS-RR to [place] [employer] on [employer]ging Direct Connect via the Senior LinkAge Line.  PAS-RR confirmation # is : [id number]    P: Further questions may be directed to Senior LinkAge Line at #[phone], option #4 for PAS-RR staff.    Anabell Rowan, MSW, LGSW   Social Work   [facility]

## 2025-05-23 NOTE — PLAN OF CARE
Reason for Admission: ICH s/p crani    Cognitive/Mentation: A/Ox 4  Neuros/CMS: Intact ex L droop, L drift, LUE weak, slurred, slow to respond  VS: /85   Pulse 62   Temp 98.7  F (37.1  C) (Oral)   Resp 16   Wt 78.1 kg (172 lb 2.9 oz)   SpO2 96%   BMI 24.01 kg/m    .   Tele: a fib, BBB.  /GI: incontinent, LBM x 2 today.   Pulmonary: LS clear, RA.  Pain: denies.     Drains/Lines: saline locked  Skin: wnl x pale  Activity: Assist x 1-2 with gbw.  Diet: soft bite with slightly thickened liquids. Takes pills whole.     Therapies recs: tcu  Discharge: pending bed availability.  Transport scheduled for sunday 5/25    Aggression Stoplight Tool: yellow, impulsive, sets off bed alarm intermittently

## 2025-05-23 NOTE — PLAN OF CARE
Goal Outcome Evaluation:         Pt here with R IPH, previous crani for tumor resection on 4/22/25. A&O. X4. Neuros L side weakness, LUE 4/5 with drift, LLE 4/5, [place], mild slurred speech, mild L droop, gen weakness. VSS, SBP parameters for < 150. Tele A-fib C VR BBB. Soft and bite sized diet, slightly thicken liquids. Takes pills whole with water. Up with A1 GB W. Denies pain. Pt scoring green on the Aggression Stop Light Tool. Plan for TCU Sunday.

## 2025-05-24 LAB
BASOPHILS # BLD AUTO: 0 10E3/UL (ref 0–0.2)
BASOPHILS NFR BLD AUTO: 0 %
EOSINOPHIL # BLD AUTO: 0 10E3/UL (ref 0–0.7)
EOSINOPHIL NFR BLD AUTO: 0 %
ERYTHROCYTE [DISTWIDTH] IN BLOOD BY AUTOMATED COUNT: 16 % (ref 10–15)
GLUCOSE BLDC GLUCOMTR-MCNC: 230 MG/DL (ref 70–99)
GLUCOSE BLDC GLUCOMTR-MCNC: 232 MG/DL (ref 70–99)
GLUCOSE BLDC GLUCOMTR-MCNC: 301 MG/DL (ref 70–99)
GLUCOSE BLDC GLUCOMTR-MCNC: 306 MG/DL (ref 70–99)
GLUCOSE BLDC GLUCOMTR-MCNC: 318 MG/DL (ref 70–99)
HCT VFR BLD AUTO: 28.5 % (ref 40–53)
HGB BLD-MCNC: 9.9 G/DL (ref 13.3–17.7)
IMM GRANULOCYTES # BLD: 0 10E3/UL
IMM GRANULOCYTES NFR BLD: 1 %
LYMPHOCYTES # BLD AUTO: 0.4 10E3/UL (ref 0.8–5.3)
LYMPHOCYTES NFR BLD AUTO: 7 %
MCH RBC QN AUTO: 30.1 PG (ref 26.5–33)
MCHC RBC AUTO-ENTMCNC: 34.7 G/DL (ref 31.5–36.5)
MCV RBC AUTO: 87 FL (ref 78–100)
MONOCYTES # BLD AUTO: 0.3 10E3/UL (ref 0–1.3)
MONOCYTES NFR BLD AUTO: 5 %
NEUTROPHILS # BLD AUTO: 5.5 10E3/UL (ref 1.6–8.3)
NEUTROPHILS NFR BLD AUTO: 88 %
NRBC # BLD AUTO: 0 10E3/UL
NRBC BLD AUTO-RTO: 0 /100
PLATELET # BLD AUTO: 109 10E3/UL (ref 150–450)
RBC # BLD AUTO: 3.29 10E6/UL (ref 4.4–5.9)
WBC # BLD AUTO: 6.3 10E3/UL (ref 4–11)

## 2025-05-24 PROCEDURE — 36415 COLL VENOUS BLD VENIPUNCTURE: CPT | Performed by: INTERNAL MEDICINE

## 2025-05-24 PROCEDURE — 85025 COMPLETE CBC W/AUTO DIFF WBC: CPT | Performed by: INTERNAL MEDICINE

## 2025-05-24 PROCEDURE — 250N000012 HC RX MED GY IP 250 OP 636 PS 637: Performed by: NURSE PRACTITIONER

## 2025-05-24 PROCEDURE — 250N000013 HC RX MED GY IP 250 OP 250 PS 637: Performed by: INTERNAL MEDICINE

## 2025-05-24 PROCEDURE — 120N000001 HC R&B MED SURG/OB

## 2025-05-24 PROCEDURE — 99232 SBSQ HOSP IP/OBS MODERATE 35: CPT | Performed by: INTERNAL MEDICINE

## 2025-05-24 PROCEDURE — 250N000013 HC RX MED GY IP 250 OP 250 PS 637: Performed by: STUDENT IN AN ORGANIZED HEALTH CARE EDUCATION/TRAINING PROGRAM

## 2025-05-24 RX ORDER — LEVETIRACETAM 1000 MG/1
1000 TABLET ORAL 2 TIMES DAILY
DISCHARGE
Start: 2025-05-24

## 2025-05-24 RX ORDER — LISINOPRIL 5 MG/1
5 TABLET ORAL DAILY
DISCHARGE
Start: 2025-05-25

## 2025-05-24 RX ORDER — AMOXICILLIN 250 MG
1 CAPSULE ORAL 2 TIMES DAILY PRN
DISCHARGE
Start: 2025-05-24

## 2025-05-24 RX ORDER — DEXAMETHASONE 1 MG
TABLET ORAL
DISCHARGE
Start: 2025-05-24 | End: 2025-06-11

## 2025-05-24 RX ORDER — POLYETHYLENE GLYCOL 3350 17 G/17G
17 POWDER, FOR SOLUTION ORAL DAILY
DISCHARGE
Start: 2025-05-24

## 2025-05-24 RX ADMIN — ATORVASTATIN CALCIUM 40 MG: 40 TABLET, FILM COATED ORAL at 08:22

## 2025-05-24 RX ADMIN — DEXAMETHASONE 4 MG: 4 TABLET ORAL at 20:29

## 2025-05-24 RX ADMIN — CARBOXYMETHYLCELLULOSE SODIUM 1 DROP: 5 SOLUTION/ DROPS OPHTHALMIC at 20:29

## 2025-05-24 RX ADMIN — LEVETIRACETAM 1000 MG: 500 TABLET, FILM COATED ORAL at 08:22

## 2025-05-24 RX ADMIN — DEXAMETHASONE 4 MG: 4 TABLET ORAL at 08:23

## 2025-05-24 RX ADMIN — INSULIN ASPART 7 UNITS: 100 INJECTION, SOLUTION INTRAVENOUS; SUBCUTANEOUS at 12:01

## 2025-05-24 RX ADMIN — CARBOXYMETHYLCELLULOSE SODIUM 1 DROP: 5 SOLUTION/ DROPS OPHTHALMIC at 08:22

## 2025-05-24 RX ADMIN — Medication 1 SPRAY: at 22:11

## 2025-05-24 RX ADMIN — INSULIN ASPART 8 UNITS: 100 INJECTION, SOLUTION INTRAVENOUS; SUBCUTANEOUS at 16:59

## 2025-05-24 RX ADMIN — LISINOPRIL 5 MG: 2.5 TABLET ORAL at 08:23

## 2025-05-24 RX ADMIN — LEVOTHYROXINE SODIUM 125 MCG: 125 TABLET ORAL at 08:22

## 2025-05-24 RX ADMIN — AZELASTINE HYDROCHLORIDE 2 SPRAY: 137 SPRAY, METERED NASAL at 08:22

## 2025-05-24 RX ADMIN — LEVETIRACETAM 1000 MG: 500 TABLET, FILM COATED ORAL at 20:29

## 2025-05-24 RX ADMIN — FENOFIBRATE 54 MG: 54 TABLET ORAL at 08:22

## 2025-05-24 RX ADMIN — AZELASTINE HYDROCHLORIDE 2 SPRAY: 137 SPRAY, METERED NASAL at 20:29

## 2025-05-24 RX ADMIN — INSULIN ASPART 4 UNITS: 100 INJECTION, SOLUTION INTRAVENOUS; SUBCUTANEOUS at 08:23

## 2025-05-24 ASSESSMENT — ACTIVITIES OF DAILY LIVING (ADL)
ADLS_ACUITY_SCORE: 70
ADLS_ACUITY_SCORE: 69
ADLS_ACUITY_SCORE: 70

## 2025-05-24 NOTE — PLAN OF CARE
Reason for Admission: ICH s/p crani     Cognitive/Mentation: A/Ox 4  Neuros/CMS: Intact ex L droop, L drift, LUE weak, slurred, slow to respond  VS: /60 (BP Location: Left arm)   Pulse 63   Temp 98.2  F (36.8  C) (Oral)   Resp 20   Wt 78.1 kg (172 lb 2.9 oz)   SpO2 97%   BMI 24.01 kg/m    .   Tele: a fib, BBB.  /GI: incontinent, LBM x 2 today.   Pulmonary: LS clear, RA.  Pain: denies.      Drains/Lines: saline locked  Skin: wnl x pale  Activity: Assist x 1-2 with gbw.  Diet: soft bite with slightly thickened liquids. Takes pills whole.      Therapies recs: tcu  Discharge: pending bed availability.  Transport scheduled for sunday 5/25     Aggression Stoplight Tool: yellow, impulsive, sets off bed alarm intermittently

## 2025-05-24 NOTE — CODE/RAPID RESPONSE
"[facility]    RRT Note  5/23/2025   Time Called: 1920    Code Status: Full Code    I was called to evaluate Psacual Perea, who is a 84 year old male who was admitted on 5/12/2025 for right frontotemporal craniotomy and discharged to ARU, readmitted on 5/12/2025 for increased somnolence at rehab and found to have new intraparenchymal hemorrhages with increased medline shift . 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    Assessment & Plan   Assisted mechanical fall  I was paged to come evaluate Mr. Perea as part of a rapid response call for a witnessed and assisted fall.  Was originally admitted on 5/12/2025 for increased somnolence noted at rehab. He was ultimately found to have new intraparenchymal hemorrhages with increased medline shift. He was also recently hospitalized from 4/22-5/4/2025 for a right frontotemporal craniotomy.  On 5/23/2025, shortly after 1900, Mr. Perea was ambulating to the bathroom with the assistance of the bedside nurse. On his way back to bed, he felt his knees \"buckle\" and he was eased to the floor onto his buttox. He did not hit his head or sustain any injury. He denies any loss of consciousness He was wearing a gait belt. He was subsequently transferred back to the bed and the RRT was called.   Upon my arrival, Mr. Perea was laying in the bed and did not appear to be in any distress. His vital signs are stable with /59, HR 83 and oxygen saturation of 96% on room air. He fully recalls the events leading up to and following the fall, stating \"my knees gave out and I was eased down onto my butt.\" He reports that this has happened before and that he has felt weak since being in the hospital. He denies any pain and no neck or back tenderness was noted on exam. He appears to be at his neurological " baseline. Skin is intact with no evidence of acute trauma. Given that the fall was assisted and witnessed with no pain or injury, would not recommend further imaging at this time.   Upon completion of the encounter, Mr. Perae is hemodynamically stable, neurologically at baseline with no signs or symptoms of injury. He will remain on the neuroscience unit for now. No indication for higher level of care at this time.         INTERVENTIONS:  -Physical/neurological exam  -Vital signs  -BG    Discussed with and defer further cares to Nursing staff      Pool Franco NP  [facility]  Securely message with the Vocera Web Console (learn more here)  Text page via [employer] Paging/Directory    Physical Exam   Vital Signs with Ranges:  Temp:  [97.8  F (36.6  C)-98.7  F (37.1  C)] 98.7  F (37.1  C)  Pulse:  [58-62] 62  Resp:  [16-18] 16  BP: (110-132)/(43-85) 126/85  SpO2:  [93 %-99 %] 96 %  I/O last 3 completed shifts:  In: 250 [P.O.:250]  Out: 1100 [Urine:1100]    Physical Exam  Vitals reviewed.   Constitutional:       General: He is not in acute distress.  Cardiovascular:      Rate and Rhythm: Normal rate and regular rhythm.   Skin:     General: Skin is warm and dry.   Neurological:      Mental Status: He is alert. Mental status is at baseline.   Psychiatric:         Attention and Perception: Attention normal.         Mood and Affect: Mood normal.         Speech: Speech normal.         Behavior: Behavior normal. Behavior is cooperative.         Thought Content: Thought content normal.         Cognition and Memory: Cognition normal.         Judgment: Judgment normal.     Data   IMAGING: (X-ray/CT/MRI)   No results found for this or any previous visit (from the past 24 hours).    CBC with Diff:  Recent Labs   Lab Test 05/23/25  0732 05/20/25  1556 05/20/25  0754   WBC 4.6   < > 2.6*   HGB 8.9*   < > 9.4*   MCV 88   < > 90   PLT 95*   < > 82*   INR  --   --  1.13    < > = values in this interval  not displayed.      Absolute Retic (10e9/L)   Date Value   07/29/2011 60.8     Absolute Reticulocyte (10e6/uL)   Date Value   06/15/2022 0.127 (H)     % Retic (%)   Date Value   07/29/2011 1.2     % Reticulocyte (%)   Date Value   06/15/2022 3.0 (H)       Comprehensive Metabolic Panel:  Recent Labs   Lab 05/23/25  1920 05/20/25  1151 05/20/25  0754   NA  --   --  137   POTASSIUM  --   --  4.1   CHLORIDE  --   --  102   CO2  --   --  26   ANIONGAP  --   --  9   *   < > 156*   BUN  --   --  11.3   CR  --   --  0.94   GFRESTIMATED  --   --  80   STARR  --   --  9.6   PROTTOTAL  --   --  5.7*   ALBUMIN  --   --  3.5   BILITOTAL  --   --  0.8   ALKPHOS  --   --  107   AST  --   --  14   ALT  --   --  9    < > = values in this interval not displayed.         Time Spent on this Encounter   I spent 15 minutes on the unit/floor managing the care of Pascual ALEXANDRA Eliazar. Over 50% of my time was spent counseling the patient and/or coordinating care regarding services listed in this note.

## 2025-05-24 NOTE — PROGRESS NOTES
RRT was called for this patient.  RT was dismissed by KOJO as Respiratory status was stable.     5/23/2025 7:39 PM  Luzma Sullivan, RT

## 2025-05-24 NOTE — PROGRESS NOTES
Reason for Admission: R IPH Shift with shift s/p R Crani with Resection ()     Cognitive/Mentation: A/Ox 4  Neuros/CMS: Intact ex L droop, [place], slurred speech, generalized weakness RUE , LUE / with drift, BLE   VS: Occasional bradycardia @ 50s, otherwise VSS on RA. SBP <150   Tele: A fib SVR-CVR with BBB and PVCs.  /GI: Continent/Incontinent. On ext cath NOC. Last BM .   Pulmonary: LS clear. Denies SOB/  Pain: Denies on shift. No nonverbal signs of pain      Drains/Lines: R PIV SL  Skin: R crani incision CDI TYRELL, scattered bruising  Activity: Assist x 1 with GBW.  Diet: Mod CHO - Soft and bite-size with slightly thick liquids. Takes pills whole.      Therapies recs: TCU  Discharge: Ride scheduled on  @ [phone] via wheelchair     Aggression Stoplight Tool: yellow for impulsivity at times     [id number]  End of shift summary: Fair sleep between cares. Seizure precautions maintained, no seizure activity noted on shift. ACHS BG. Neutropenic precautions maintained.

## 2025-05-24 NOTE — PROGRESS NOTES
Chart check. Neuro at baseline. Needs platelets drawn  No changes from nsgy standpoint    Chrissy Jackson PA-C  [facility] Neurosurgery  [address] [address]  Tel [phone]  Fax [phone]  Text page via [facility] Paging/Directory

## 2025-05-24 NOTE — SIGNIFICANT EVENT
RRT called for pt witnessed, assisted fall.  Pt lowered to ground.  Pt stated his 'legs gave out'.  Pt did not hit head.  No LOC.  House officer at bedside.

## 2025-05-24 NOTE — PROGRESS NOTES
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 Admitted to ICU with hospital medicine as primary.      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 Discharged to rehab 5/4, then with gradual decline starting 5/9, increased weakness and somnolence with CT head with increased R temporal lobe hemorrahgic mass and new left intraventricular hemorrhage, transferred to [facility] ICU on 5/12/25    *5/19 noted to have worsening confusion, garbled speech, and L facial droop. Repeat CTH shows increased size of R temporal region parenchymal hemorrhage with similar surrounding vasogenic edema and mass effect/midline shift and a new acute hemorrhage in the posterior aspect of the right lateral ventricle suspicious for acute choroid plexus hemorrhage or intraparenchymal hemorrhage extension. CTA head/neck negative for large vessel occlusion, high  grade stenosis, or acute dissection.    Neurosurgery following (appreciated)  Neuro checks q4H  If there is any worsening in mental status or neuro exam, obtain stat CT head and call neurosurgery team. They would consider doing redo craniotomy.  Platelet transfusions for goal >75k.   SBP goal <150, prns available  - Evaluated by SLP and diet advanced to pureed level 4 and mildly thick level 2 liquids  - Keppra dose reduced to 1000mg BID on 5/17/25 due to somnolence and ongoing Thrombocytopenia   - Hold DVT prophylaxis  - Steroids not indicated per neurosurgery  - HOB 30 degrees   - Seizure precautions  - PT/OT/SLP consultations requested. PT recommending TCU at discharge.   Now pending TCU placement/transportation     Pancytopenia  Neutropenic fever, resolved    Updated by the bedside RN that patient had a fever of 101.3 on 5/18 evening  Pt is leukopenic with WBC count of 1.9  UA was already ordered for an episode of pink urine with possible hematuria  Chest x-ray stat 2 views ordered, blood cultures x 2 ordered  Started on IV cefepime for possible neutropenic fever on 5/18/2025  Tylenol as needed for fever ordered  CBC with differential ordered for a.m.  Neutropenic precautions ordered    [facility] oncology reconsultation requested  Addendum;  UA returned negative with less than 1 WBC, less than 1 RBC, nitrates negative leukocyte esterase negative  Chest x-ray also returned negative for pneumonia    - Initially started on cefepime for concern for neutropenic fever. Pt not neutropenic and no clear source of infection so monitor off antibiotics for now.     High grade neuroendocrine tumor, large cell type, left parotid gland s/p chemoradiation   Hx of neuroendocrine tumor as above, follows with [facility] (Dr. Butler), previously felt to be in remission.  Evaluated by radiation oncology during previous admission, given limited disease extent had recommended postoperative stereotactic radiation therapy to surgical  cavity in 5 fractions, they had planned to repeat imaging 2 to 3 weeks after discharge and initiate treatment about 1 week later.    Last radiation treatment completed 5/23/25    - Heme/onc consulted and signed off. Plan on seeing patient in a week for count checks and KOJO and then follow up with Dr. Butler in June.  - Radiation oncology started radiation therapy inpatient from 5/19/2025 for 5 days. Completed today, 5/23/2025. Now ready for discharge to TCU.     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 Transfusion goal >75 with active intracranial bleed. Received platelets x2 5/12- recheck level of 76 obtained prior to 2nd transfusion being complete.  - Transfuse PRN to >75, consented  - Monitor platelets closely  - heme/onc consulted. Work up for thrombocytopenia was negative.     Atrial fibrillation with slow ventricular response  Previous admission had ongoing asymptomatic bradycardia without evidence of higher degree block, remained in A-fib. Intermittently bradycardic to 50s without mental status changes or hypertension here, less likely Cushing's reflex. EKG appears consistent with afib with slow ventricular response.   - Monitor closely  - Continue to hold apixaban at discharge given rebleeding when he was placed on VTE prophylaxis. Expressed risk of stroke due to this with patient and family but explained that the significant risk of rebleeding is too high to restart anticoagulation at this time.  Heart rate currently stable in the 50s.  Patient is asymptomatic     Moderate oral and pharyngeal dysphagia  Noted on previous admission, evaluated by SLP and nutrition, diet has been advanced to regular with thin liquids   - Diet per speech pathology    Doing well and feeding himself slowly this am      Coronary artery disease status post CABG  LVH  Postoperative hypotension after recent crani , CODE BLUE activated  HTN   Of note, had  hypotensive episode following extubation in the PACU to the point that he required code blue and CPR.    BP in the 140s started on low dose lisinopril 5mg po daily on 5/17/25. 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 Creatinine stable.     Diabetes mellitus, type 2, on long term insulin   HgbA1c 7.9% 4/2025. Discharged with glargine 20 units BID with sliding scale, rehab decreased to 20 units daily. Did have issues with hypoglycemia previous admission.  - Increase Lantus to 15 units BID  - Medium dose sliding scale ordered increase sliding scale to high dose on 5/17/2025     Hyperlipidemia  - Continue PTA atorvastatin, fenofibrate      Hypothyroidism  - Continue PTA levothyroxine       PPE Used:  Mask, gloves          Diet: Room Service  Combination Diet Soft and Bite Sized Diet (level 6); Slightly Thick (level 1) (fully alert/upright, no straws, verify/cue to swallow, alternate between bites/sips; HOLD if any difficulty/decline)    DVT Prophylaxis: Pneumatic Compression Devices  Duran Catheter: Not present  Lines: None     Cardiac Monitoring: None  Code Status: Full Code      Clinically Significant Risk Factors                 # Thrombocytopenia: Lowest platelets = 92 in last 2 days, will monitor for bleeding   # Hypertension: Noted on problem list           # DMII: A1C = 7.9 % (Ref range: <5.7 %) within past 6 months       # Financial/Environmental Concerns: none         Disposition Plan     Medically Ready for Discharge: Ready Now             Marita Jackson DO  Hospitalist Service  [facility]  Securely message with [employer] (more info)  Text page via [employer] Paging/Directory   ______________________________________________________________________    Interval History     Seen eating breakfast sitting up in bed this am.  Awake but not answering my questions this am   -   possibly is too busy eating.      No new concerns overnight  per RN    Physical Exam   Vital Signs: Temp: 97.8  F (36.6  C) Temp src: Axillary BP: 117/57 Pulse: 60   Resp: 18 SpO2: 95 % O2 Device: None (Room air)    Weight: 172 lbs 2.87 oz    GEN:  Alert, awake, appears comfortable, no overt distress.  HEENT:  No scleral icterus, no nasal discharge  CV:  Regular rate and rhythm, no murmur  LUNGS:  Clear to auscultation ant/lat bilaterally without rales/rhonchi/wheezing/retractions.  Symmetric chest rise on inhalation noted.  ABD:  Active bowel sounds, soft, non-tender to light palpation throughout.  Not significantly distended  EXT:  trace pretibial edema bilaterally   SKIN:  Dry to touch, no exanthems noted in the visualized areas.    Medical Decision Making       48 MINUTES SPENT BY ME on the date of service doing chart review, history, exam, documentation & further activities per the note.      Data   Medications   Current Facility-Administered Medications   Medication Dose Route Frequency Provider Last Rate Last Admin     Current Facility-Administered Medications   Medication Dose Route Frequency Provider Last Rate Last Admin    artificial saliva (BIOTENE MT) solution 1 spray  1 spray Mouth/Throat At Bedtime Arleen Winkler MD   1 spray at 05/23/25 2127    atorvastatin (LIPITOR) tablet 40 mg  40 mg Oral Daily Arleen Winkler MD   40 mg at 05/23/25 0839    azelastine (ASTELIN) nasal spray 2 spray  2 spray Both Nostrils BID Arleen Winkler MD   2 spray at 05/23/25 2126    carboxymethylcellulose PF (REFRESH PLUS) 0.5 % ophthalmic solution 1 drop  1 drop Both Eyes BID Arleen Winkler MD   1 drop at 05/23/25 2128    dexAMETHasone (DECADRON) tablet 4 mg  4 mg Oral BID Nelly Coon NP   4 mg at 05/23/25 2130    Followed by    [START ON 5/29/2025] dexAMETHasone (DECADRON) tablet 4 mg  4 mg Oral Daily Nelly Coon NP        Followed by    [START ON 6/5/2025] dexAMETHasone (DECADRON) tablet 2 mg  2 mg Oral Daily Divina Coonilee MAX, NP        Followed by    [START ON  6/9/2025] dexAMETHasone (DECADRON) tablet 1 mg  1 mg Oral Daily Nelly Coon NP        fenofibrate (LOFIBRA) tablet 54 mg  54 mg Oral Daily with breakfast Arleen Winkler MD   54 mg at 05/23/25 0838    [Held by provider] heparin ANTICOAGULANT injection 5,000 Units  5,000 Units Subcutaneous Q12H Arleen Winkler MD   5,000 Units at 05/18/25 2014    insulin aspart (NovoLOG) injection (RAPID ACTING)  1-10 Units Subcutaneous TID AC Arleen Winkler MD   9 Units at 05/23/25 1728    insulin aspart (NovoLOG) injection (RAPID ACTING)  1-7 Units Subcutaneous At Bedtime Arleen Winkler MD   3 Units at 05/23/25 2137    insulin glargine (LANTUS PEN) injection 15 Units  15 Units Subcutaneous BID Reese Dixon MD   15 Units at 05/23/25 2124    levETIRAcetam (KEPPRA) tablet 1,000 mg  1,000 mg Oral BID Reese Dixon MD   1,000 mg at 05/23/25 2129    Or    levETIRAcetam (KEPPRA) intermittent infusion 1,000 mg  1,000 mg Intravenous BID Reese Dixon  mL/hr at 05/19/25 1332 1,000 mg at 05/20/25 2034    levothyroxine (SYNTHROID/LEVOTHROID) tablet 125 mcg  125 mcg Oral Daily Arleen Winkler MD   125 mcg at 05/23/25 0839    lisinopril (ZESTRIL) tablet 5 mg  5 mg Oral Daily Arleen Winkler MD   5 mg at 05/23/25 0838    sodium chloride (PF) 0.9% PF flush 3 mL  3 mL Intracatheter Q8H [facility] Arleen Winkler MD   3 mL at 05/24/25 0002     Labs and Imaging results below reviewed today.  Recent Labs   Lab 05/24/25  1209 05/23/25  0732 05/22/25  0714   WBC 6.3 4.6 4.6   HGB 9.9* 8.9* 9.4*   HCT 28.5* 26.1* 28.8*   MCV 87 88 91   * 95* 92*     Recent Labs   Lab 05/24/25  1127 05/24/25  0744 05/24/25  0145 05/20/25  1151 05/20/25  0754 05/19/25  1716 05/19/25  0915   NA  --   --   --   --  137  --  137   POTASSIUM  --   --   --   --  4.1  --  4.2   CHLORIDE  --   --   --   --  102  --  103   CO2  --   --   --   --  26  --  23   ANIONGAP  --   --   --   --  9  --  11   * 230* 232*   < > 156*   < > 208*   BUN  --   --   --    --  11.3  --  14.9   CR  --   --   --   --  0.94  --  0.95   GFRESTIMATED  --   --   --   --  80  --  79   STARR  --   --   --   --  9.6  --  9.6    < > = values in this interval not displayed.     7-Day Micro Results       Collected Updated Procedure Result Status      05/18/2025 1807 05/23/2025 2047 Blood Culture Peripheral blood (BC) Arm, Right [[id number]]   Peripheral blood (BC) from Arm, Right    Final result Component Value   Culture No Growth               05/18/2025 1802 05/23/2025 2047 Blood Culture Peripheral blood (BC) Arm, Left [[id number]]   Peripheral blood (BC) from Arm, Left    Final result Component Value   Culture No Growth                     BoxC   Lab 05/24/25  1127 05/24/25  0744 05/24/25  0145 05/20/25  1151 05/20/25  0754 05/19/25  1716 05/19/25  0915   NA  --   --   --   --  137  --  137   POTASSIUM  --   --   --   --  4.1  --  4.2   CHLORIDE  --   --   --   --  102  --  103   CO2  --   --   --   --  26  --  23   ANIONGAP  --   --   --   --  9  --  11   * 230* 232*   < > 156*   < > 208*   BUN  --   --   --   --  11.3  --  14.9   CR  --   --   --   --  0.94  --  0.95   GFRESTIMATED  --   --   --   --  80  --  79   STARR  --   --   --   --  9.6  --  9.6   PROTTOTAL  --   --   --   --  5.7*  --   --    ALBUMIN  --   --   --   --  3.5  --   --    BILITOTAL  --   --   --   --  0.8  --   --    ALKPHOS  --   --   --   --  107  --   --    AST  --   --   --   --  14  --   --    ALT  --   --   --   --  9  --   --     < > = values in this interval not displayed.     BoxC   Lab 05/20/25  0754 05/19/25  0915   INR 1.13 1.04     Recent Labs   Lab 05/18/25  1802   LACT 0.9       No results found for this or any previous visit (from the past 24 hours).

## 2025-05-25 VITALS
DIASTOLIC BLOOD PRESSURE: 58 MMHG | SYSTOLIC BLOOD PRESSURE: 113 MMHG | BODY MASS INDEX: 24.01 KG/M2 | OXYGEN SATURATION: 96 % | RESPIRATION RATE: 24 BRPM | WEIGHT: 172.18 LBS | TEMPERATURE: 97.5 F | HEART RATE: 59 BPM

## 2025-05-25 DIAGNOSIS — G93.6 VASOGENIC BRAIN EDEMA (H): ICD-10-CM

## 2025-05-25 DIAGNOSIS — Z98.890 S/P CRANIOTOMY: ICD-10-CM

## 2025-05-25 LAB
ANION GAP SERPL CALCULATED.3IONS-SCNC: 9 MMOL/L (ref 7–15)
BUN SERPL-MCNC: 28.4 MG/DL (ref 8–23)
CALCIUM SERPL-MCNC: 10.1 MG/DL (ref 8.8–10.4)
CHLORIDE SERPL-SCNC: 97 MMOL/L (ref 98–107)
CREAT SERPL-MCNC: 0.86 MG/DL (ref 0.67–1.17)
EGFRCR SERPLBLD CKD-EPI 2021: 86 ML/MIN/1.73M2
GLUCOSE BLDC GLUCOMTR-MCNC: 237 MG/DL (ref 70–99)
GLUCOSE BLDC GLUCOMTR-MCNC: 239 MG/DL (ref 70–99)
GLUCOSE BLDC GLUCOMTR-MCNC: 261 MG/DL (ref 70–99)
GLUCOSE SERPL-MCNC: 296 MG/DL (ref 70–99)
HCO3 SERPL-SCNC: 27 MMOL/L (ref 22–29)
POTASSIUM SERPL-SCNC: 4.9 MMOL/L (ref 3.4–5.3)
SODIUM SERPL-SCNC: 133 MMOL/L (ref 135–145)

## 2025-05-25 PROCEDURE — 250N000013 HC RX MED GY IP 250 OP 250 PS 637: Performed by: INTERNAL MEDICINE

## 2025-05-25 PROCEDURE — 99239 HOSP IP/OBS DSCHRG MGMT >30: CPT | Performed by: INTERNAL MEDICINE

## 2025-05-25 PROCEDURE — 80048 BASIC METABOLIC PNL TOTAL CA: CPT | Performed by: INTERNAL MEDICINE

## 2025-05-25 PROCEDURE — 36415 COLL VENOUS BLD VENIPUNCTURE: CPT | Performed by: INTERNAL MEDICINE

## 2025-05-25 PROCEDURE — 250N000013 HC RX MED GY IP 250 OP 250 PS 637: Performed by: STUDENT IN AN ORGANIZED HEALTH CARE EDUCATION/TRAINING PROGRAM

## 2025-05-25 PROCEDURE — 250N000012 HC RX MED GY IP 250 OP 636 PS 637: Performed by: NURSE PRACTITIONER

## 2025-05-25 RX ADMIN — ATORVASTATIN CALCIUM 40 MG: 40 TABLET, FILM COATED ORAL at 09:20

## 2025-05-25 RX ADMIN — INSULIN ASPART 5 UNITS: 100 INJECTION, SOLUTION INTRAVENOUS; SUBCUTANEOUS at 12:52

## 2025-05-25 RX ADMIN — LISINOPRIL 5 MG: 2.5 TABLET ORAL at 09:19

## 2025-05-25 RX ADMIN — INSULIN ASPART 4 UNITS: 100 INJECTION, SOLUTION INTRAVENOUS; SUBCUTANEOUS at 09:19

## 2025-05-25 RX ADMIN — FENOFIBRATE 54 MG: 54 TABLET ORAL at 09:20

## 2025-05-25 RX ADMIN — LEVETIRACETAM 1000 MG: 500 TABLET, FILM COATED ORAL at 09:19

## 2025-05-25 RX ADMIN — DEXAMETHASONE 4 MG: 4 TABLET ORAL at 09:20

## 2025-05-25 RX ADMIN — AZELASTINE HYDROCHLORIDE 2 SPRAY: 137 SPRAY, METERED NASAL at 09:19

## 2025-05-25 RX ADMIN — LEVOTHYROXINE SODIUM 125 MCG: 125 TABLET ORAL at 09:20

## 2025-05-25 RX ADMIN — CARBOXYMETHYLCELLULOSE SODIUM 1 DROP: 5 SOLUTION/ DROPS OPHTHALMIC at 09:19

## 2025-05-25 ASSESSMENT — ACTIVITIES OF DAILY LIVING (ADL)
ADLS_ACUITY_SCORE: 69
ADLS_ACUITY_SCORE: 70
ADLS_ACUITY_SCORE: 69
ADLS_ACUITY_SCORE: 70
ADLS_ACUITY_SCORE: 70
ADLS_ACUITY_SCORE: 69
ADLS_ACUITY_SCORE: 70
ADLS_ACUITY_SCORE: 69

## 2025-05-25 NOTE — PLAN OF CARE
"Speech Language Therapy Discharge Summary    Reason for therapy discharge:    Discharged to transitional care facility.    Progress towards therapy goal(s). See goals on Care Plan in Epic electronic health record for goal details.  Goals not met.  Barriers to achieving goals:   discharge from facility.    Therapy recommendation(s):    Continued therapy is recommended.  Rationale/Recommendations:  dysphagia tx; likely cognitive linguistic eval/tx needs as well.    Per last SLP: \"Recommend continue soft/bite sized solids (IDDSI 6) and upgrade to SLIGHTLY thick (IDDSI 1) liquids given close monitoring and only allow PO when fully alert, upright. Single sips, no straws, alternate between bites/sips; HOLD if any difficulty/decline.\"    *Pt not seen by discharging therapist on this date, note written based on previous treating therapist's notes and recommendations     "

## 2025-05-25 NOTE — PROGRESS NOTES
Care Management Discharge Note    Discharge Date: 05/25/2025       Discharge Disposition: Transitional Care    Discharge Services: Transportation Services    Discharge DME: None    Discharge Transportation: agency, family or friend will provide    Private pay costs discussed: Not applicable    Does the patient's insurance plan have a 3 day qualifying hospital stay waiver?  No    PAS Confirmation Code: [id number]  Patient/family educated on Medicare website which has current facility and service quality ratings: yes    Education Provided on the Discharge Plan: Yes  Persons Notified of Discharge Plans: yes  Patient/Family in Agreement with the Plan: yes    Handoff Referral Completed: No, handoff not indicated or clinically appropriate    Additional Information:  Patient will be discharging today to [facility] via wheelchair ride between [id number]. Patient aware and in agreement with this plan. Discharge orders signed and sent to the facility.     JOYCELYN Robles

## 2025-05-25 NOTE — PLAN OF CARE
Goal Outcome Evaluation:      Plan of Care Reviewed With: patient    Overall Patient Progress: no changeOverall Patient Progress: no change     Pt here with IPH with midline shift. A&O x4. Neuros L drop, L drift, L field cut, slurred speech. VSS on RA. Tele A fib CVR. Soft and bite sized diet, mildly thick liquids. Takes pills whole. Up with assist x1 GB/W. Uses urinal at bedside Denies pain. Pt scoring greeen/yellow on the Aggression Stop Light Tool. Discharge today to [facility] wheelchair pickup between [id number].

## 2025-05-25 NOTE — DISCHARGE SUMMARY
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 in 4 weeks with head CT prior to appt .  You may call the clinic with any scheduling questions or concerns.    Seek medical attention with any increased headaches, dizziness, nausea/vomiting, vision/speech changes, weakness, confusion, seizure activity, or other neurological changes.     [facility]  Tel [phone]        Follow Up and recommended labs and tests      F/up with heme/onc next week with KOJO   F/up with Dr. Butler later in June (as scheduled)                Unresulted Labs Ordered in the Past 30 Days of this Admission       No orders found from 4/12/2025 to 5/13/2025.        These results will be followed up by TCU physician    Discharge Disposition   Discharged to short-term care facility  Condition at discharge: Stable    Hospital Course   Pascual Perea is a 84 year old male with PMH high grade neuroendocrine tumor of left parotid gland s/p chemoradiation, atrial fibrillation (previously on DOAC, held), HTN, HLD, hypothyroidism, T2DM, CKD3, CAD s/p CABG admitted 4/22-5/4/25  for right frontotemporal craniotomy and discharged to ARU, readmitted on 5/12/2025 for increased somnolence at rehab and found to have new intraparenchymal hemorrhages with increased medline shift. Admitted to ICU with hospital medicine as primary.      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 Discharged to rehab 5/4, then with gradual decline starting 5/9, increased weakness and somnolence with CT head with increased R temporal lobe hemorrahgic mass and new left intraventricular hemorrhage, transferred to [facility] ICU on 5/12/25    *5/19 noted to have worsening confusion, garbled speech, and L facial droop. Repeat CTH shows increased size of R temporal region parenchymal hemorrhage with similar surrounding vasogenic edema and mass effect/midline shift and a new acute hemorrhage in the posterior aspect of the right lateral ventricle suspicious for acute choroid plexus hemorrhage or intraparenchymal hemorrhage extension. CTA head/neck negative for large vessel occlusion, high grade stenosis, or acute dissection.    Neurosurgery following (appreciated)  Neuro checks q4H  If there is any worsening in mental status or neuro exam, obtain stat CT head and call neurosurgery team. They would consider doing redo craniotomy.  Platelet transfusions for goal >75k.   SBP goal <150, prns available  - Evaluated by SLP and diet advanced to pureed level 4 and mildly thick  level 2 liquids  - Keppra dose reduced to 1000mg BID on 5/17/25 due to somnolence and ongoing Thrombocytopenia   - Hold DVT prophylaxis  - Steroids not indicated per neurosurgery  - HOB 30 degrees   - Seizure precautions  - PT/OT/SLP consultations requested. PT recommending TCU at discharge.     Pancytopenia  Neutropenic fever, resolved    Updated by the bedside RN that patient had a fever of 101.3 on 5/18 evening  Pt is leukopenic with WBC count of 1.9  UA was already ordered for an episode of pink urine with possible hematuria  Chest x-ray stat 2 views ordered, blood cultures x 2 ordered  Started on IV cefepime for possible neutropenic fever on 5/18/2025  Tylenol as needed for fever ordered  CBC with differential ordered for a.m.  Neutropenic precautions ordered    [place] oncology reconsultation requested  Addendum;  UA returned negative with less than 1 WBC, less than 1 RBC, nitrates negative leukocyte esterase negative  Chest x-ray also returned negative for pneumonia    - Initially started on cefepime for concern for neutropenic fever. Pt not neutropenic and no clear source of infection so monitor off antibiotics for now.     High grade neuroendocrine tumor, large cell type, left parotid gland s/p chemoradiation   Hx of neuroendocrine tumor as above, follows with [facility] (Dr. Butler), previously felt to be in remission.  Evaluated by radiation oncology during previous admission, given limited disease extent had recommended postoperative stereotactic radiation therapy to surgical cavity in 5 fractions, they had planned to repeat imaging 2 to 3 weeks after discharge and initiate treatment about 1 week later.    Last radiation treatment completed 5/23/25    - Heme/onc consulted and signed off. Plan on seeing patient in a week for count checks and KOJO and then follow up with Dr. Butler in June.  - Radiation oncology started radiation therapy inpatient from 5/19/2025 for 5 days. Completed today, 5/23/2025.  Now ready for discharge to TCU.     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 Transfusion goal >75 with active intracranial bleed. Received platelets x2 5/12- recheck level of 76 obtained prior to 2nd transfusion being complete.  - Monitor platelets closely  - heme/onc consulted. Work up for thrombocytopenia was negative.     Atrial fibrillation with slow ventricular response  Previous admission had ongoing asymptomatic bradycardia without evidence of higher degree block, remained in A-fib. Intermittently bradycardic to 50s without mental status changes or hypertension here, less likely Cushing's reflex. EKG appears consistent with afib with slow ventricular response.   - Monitor closely  - Continue to hold apixaban at discharge given rebleeding when he was placed on VTE prophylaxis. Expressed risk of stroke due to this with patient and family but explained that the significant risk of rebleeding is too high to restart anticoagulation at this time.  Heart rate currently stable in the 50s.  Patient is asymptomatic     Moderate oral and pharyngeal dysphagia  Noted on previous admission, evaluated by SLP and nutrition, diet has been advanced to regular with thin liquids   - Diet per speech pathology    Doing well and feeding himself slowly       Coronary artery disease status post CABG  LVH  Postoperative hypotension after recent crani , CODE BLUE activated  HTN   Of note, had hypotensive episode following extubation in the PACU to the point that he required code blue and CPR.    BP in the 140s started on low dose lisinopril 5mg po daily on 5/17/25. 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 Creatinine stable.     Diabetes mellitus, type 2, on long term insulin   HgbA1c 7.9% 4/2025. Discharged with glargine 20 units BID with sliding scale, rehab decreased to 20 units daily. Did have issues with  hypoglycemia previous admission.  -  Lantus to 15 units BID  Sliding scale      Hyperlipidemia  - Continue atorvastatin, fenofibrate      Hypothyroidism  - Continue  levothyroxine       PPE Used:  Mask, gloves    Consultations This Hospital Stay   PHYSICAL THERAPY ADULT IP CONSULT  OCCUPATIONAL THERAPY ADULT IP CONSULT  NEUROSURGERY IP CONSULT  HEMATOLOGY & ONCOLOGY IP CONSULT  SPEECH LANGUAGE PATH ADULT IP CONSULT  CARE MANAGEMENT / SOCIAL WORK IP CONSULT  NEUROLOGY CRITICAL CARE ADULT IP CONSULT  RADIATION ONCOLOGY IP CONSULT  HEMATOLOGY & ONCOLOGY IP CONSULT  PHYSICAL THERAPY ADULT IP CONSULT  OCCUPATIONAL THERAPY ADULT IP CONSULT  SPEECH LANGUAGE PATH ADULT IP CONSULT    Code Status   Full Code    Time Spent on this Encounter   I, Janis Webb MD, personally saw the patient today and spent greater than 30 minutes discharging this patient.       Janis Webb MD  [facility]  [address] [address]  Phone: [phone]  ______________________________________________________________________    Physical Exam   Vital Signs: Temp: 97.5  F (36.4  C) Temp src: Oral BP: 113/58 Pulse: 59   Resp: 24 SpO2: 96 % O2 Device: None (Room air)    Weight: 172 lbs 2.87 oz       GEN:  sleepy but arousable , appears comfortable, no overt distress.  Follows command speech slurred   HEENT:  No scleral icterus, no nasal discharge  CV:  Regular rate and rhythm, no murmur  LUNGS:  Clear to auscultation ant/lat bilaterally without rales/rhonchi/wheezing/retractions.  Symmetric chest rise on inhalation noted.  ABD:  Active bowel sounds, soft, non-tender to light palpation throughout.  Not significantly distended  EXT:  trace pretibial edema bilaterally   SKIN:  Dry to touch, no exanthems noted in the visualized areas.  Primary Care Physician   Andrew Elizalde    Discharge Orders      CT Head w/o contrast*     Primary Care - Care Coordination Referral      Follow Up    Please follow up  at [facility] in 4 weeks with head CT prior to appt .  You may call the clinic with any scheduling questions or concerns.    Seek medical attention with any increased headaches, dizziness, nausea/vomiting, vision/speech changes, weakness, confusion, seizure activity, or other neurological changes.     [facility]  Tel [phone]     General info for SNF    Length of Stay Estimate: Short Term Care: Estimated # of Days <30  Condition at Discharge: Improving  Level of care:skilled   Rehabilitation Potential: Good  Admission H&P remains valid and up-to-date: Yes  Recent Chemotherapy: N/A  Use Nursing Home Standing Orders: Yes     Mantoux instructions    Give two-step Mantoux (PPD) Per Facility Policy Yes     Follow Up and recommended labs and tests    F/up with heme/onc next week with KOJO   F/up with Dr. Butler later in June (as scheduled)     Reason for your hospital stay    Returned to the hospital after recent neurosurgery for altered state and found to have new hemorrhage     Activity - Up with nursing assistance     Full Code     Physical Therapy Adult Consult    Evaluate and treat as clinically indicated.    Reason:  weakness     Occupational Therapy Adult Consult    Evaluate and treat as clinically indicated.    Reason:  weakness     Speech Language Path Adult Consult    Evaluate and treat as clinically indicated.    Reason:  dysphagia     Fall precautions     Diet    Follow this diet upon discharge: Combination Diet Soft and Bite Sized Diet (level 6); Slightly Thick (level 1) (fully alert/upright, no straws, verify/cue to swallow, alternate between bites/sips; HOLD if any difficulty/decline)       Significant Results and Procedures   Results for orders placed or performed during the hospital encounter of 05/12/25   CT Head w/o Contrast    Narrative    EXAM: CT HEAD W/O CONTRAST  LOCATION: [facility]  DATE: 5/12/2025    INDICATION: repeat CT  intracranial bleed  COMPARISON: 5/12/2025 at 10:48 AM.  TECHNIQUE: Routine CT Head without IV contrast. Multiplanar reformats. Dose reduction techniques were used.    FINDINGS:  INTRACRANIAL CONTENTS: Stable appearance to an intraparenchymal hemorrhage finding involving the right temporal lobe with surrounding vasogenic edema and mass effect. This table midline shift of approximately 5 mm to the left at the level of the lateral   ventricles. Sulcal effacement of the right cerebral hemisphere is redemonstrated. Postsurgical changes are stable.        Impression    IMPRESSION:  1.  Stable postsurgical changes and high density blood products associated with a mass lesion in the right temporal lobe, unchanged size from the prior study with associated vasogenic edema. No new hemorrhage findings.   MR Brain w/o & w Contrast    Addendum: 5/13/2025    Discussed the findings with ROSARIO WILLSON at 3:47 PM, 05/13/2025      Narrative    EXAM: MR BRAIN W/O and W CONTRAST  LOCATION: [facility]  DATE: 5/13/2025    INDICATION: tumor protocol for R temp mass IPH  COMPARISON: Head CT 05/12/2025, brain MRI 04/23/2025, 04/16/2025  CONTRAST: 8 mL Gadavist  TECHNIQUE: Routine multiplanar multisequence head MRI without and with intravenous contrast.    FINDINGS:  INTRACRANIAL CONTENTS: Diffusion-weighted images demonstrate no areas of acute/subacute infarcts. Postsurgical changes of left frontotemporal craniotomy and debulking of right temporal lobe tumor. 6.7 mm FLAIR hyperintense extra-axial fluid deep to the   right-sided craniotomy, previously 3.4 mm on brain MRI 04/23/2025. Mild dural thickening and enhancement deep to the right-sided craniotomy.     3.7 x 4.7 cm hemorrhagic enhancing lesion in the right temporal lobe, measured 2.7 x 3 cm and 04/23/2025 and 4 x 5 cm on 04/16/2025. The surrounding confluent T2 prolongation involving most of the right temporal lobe as well as the right   temporo-occipital  region is not significantly changed compared to 04/23/2023. These enhancing lesion does appear more heterogeneous and less solidly enhancing compared to the most recent prior brain MRI 04/23/2025.    There is persistent associated mass effect with partial effacement of the right lateral ventricle and the third ventricle. No significant change in the 5.5 mm midline shift to the left at the level of the lateral ventricles. Stable mild medialization of   the right uncus. No cerebellar tonsillar ectopia Non-suppression of the FLAIR hyperintensity involving the CSF spaces along the basilar cisterns, favor to be on an artifactual basis. Mild diffuse cerebral parenchymal volume loss. Mild periventricular and   scattered foci of deep white matter T2 prolongation involving both cerebral hemispheres.    No new abnormal intracranial enhancement.    SELLA: No abnormality accounting for technique.    OSSEOUS STRUCTURES/SOFT TISSUES: Normal marrow signal. The major intracranial vascular flow voids are maintained.     ORBITS: Prior bilateral cataract surgery. Visualized portions of the orbits are otherwise unremarkable.     SINUSES/MASTOIDS: Mild mucosal thickening of the of the maxillary sinuses and ethmoid air cells. Moderate opacification of the right mastoid air cells.       Impression    IMPRESSION:  1.  3.7 x 4.7 cm hemorrhagic enhancing lesion in the right temporal lobe, measured 2.7 x 3 cm and 04/23/2025 and 4 x 5 cm on 04/16/2025 when measured in a similar fashion. The surrounding vasogenic edema involving most of the right temporal lobe as well   as the right temporo-occipital region is not significantly changed compared to 04/23/2023. These enhancing lesion does appear more heterogeneous and less solidly enhancing compared to the most recent prior brain MRI 04/23/2025.  2.  There is persistent associated mass effect with partial effacement of the right lateral ventricle and the third ventricle. No significant change in  the 5.5 mm midline shift to the left at the level of the lateral ventricles. Stable mild medialization   of the right uncus.  3.  6.7 mm FLAIR hyperintense extra-axial fluid deep to the right-sided craniotomy, previously 3.4 mm on brain MRI 04/23/2025. Mild dural thickening and enhancement deep to the right-sided craniotomy.  4.  Mild diffuse cerebral parenchymal volume loss. Presumed chronic hypertensive/microvascular ischemic white matter changes.     XR Video Swallow with SLP or OT    Narrative    EXAM: XR VIDEO SWALLOW WITH SLP OR OT  LOCATION: [facility]  DATE: 5/14/2025    INDICATION: Difficulty swallowing.  COMPARISON: None.    TECHNIQUE: Routine swallow study with speech pathology using multiple barium thicknesses.    RADIATION DOSE: Total Air Kerma 2.2 mGy      Impression    IMPRESSION:   Swallow study with Speech Pathology using multiple barium thicknesses.     Thin: Flash penetration. No aspiration. Residue.    Slightly thick: No penetration or aspiration. Residue.    Semisolid: No penetration or aspiration. Residue.    Regular: No penetration or aspiration. Residue.   CT Head w/o Contrast    Narrative    EXAM: CT HEAD WITHOUT CONTRAST  LOCATION: [facility]  DATE: 05/14/2025    INDICATION: Follow-up stability scan.  COMPARISON: Brain MRI 05/13/2025. Head CT 05/12/2025.  TECHNIQUE: Routine CT Head without IV contrast. Multiplanar reformats. Dose reduction techniques were used.    FINDINGS:  INTRACRANIAL CONTENTS: Unchanged large hemorrhagic mass centered within the right anterior temporal lobe with surrounding vasogenic edema. Mass effect causing effacement of the right lateral ventricle and mild leftward shift of midline structures by 5   mm, unchanged. Redemonstrated right-sided craniotomy with chronic extra-axial collection beneath the craniotomy defect. Previously noted intraventricular hemorrhage within the left occipital horn is no longer visible.  No new hemorrhage.    VISUALIZED ORBITS/SINUSES/MASTOIDS: No intraorbital abnormality. No paranasal sinus mucosal disease. No middle ear or mastoid effusion.    BONES/SOFT TISSUES: No acute abnormality.      Impression    IMPRESSION:  1.  Unchanged large hemorrhagic mass centered within the right anterior temporal lobe with surrounding vasogenic edema and mass effect.  2.  Interval resolution of intraventricular hemorrhage within the left occipital horn.     XR Chest 2 Views    Narrative    EXAM: XR CHEST 2 VIEWS  LOCATION: [facility]  DATE: 5/18/2025    INDICATION: Fever  COMPARISON: 4/22/2025      Impression    IMPRESSION: Previous sternotomy for coronary bypass. Stable borderline cardiomegaly with normal vascularity. No focal consolidation, pneumothorax nor pleural effusion.   CT Head w/o Contrast    Narrative    EXAM: CT HEAD W/O CONTRAST, CTA HEAD NECK W CONTRAST  LOCATION: [facility]  DATE: 5/19/2025    INDICATION: Code Stroke, rule out hemorrhage and evaluate for potential thrombolysis thrombectomy. PLEASE READ IMMEDIATELY.  COMPARISON: 05/14/2025, MRI 05/14/2025  CONTRAST: 67mL Isovue 370  TECHNIQUE: Head and neck CT angiogram with IV contrast. Noncontrast head CT followed by axial helical CT images of the head and neck vessels obtained during the arterial phase of intravenous contrast administration. Axial 2D reconstructed images and   multiplanar 3D MIP reconstructed images of the head and neck vessels were performed by the technologist. Dose reduction techniques were used. All stenosis measurements made according to NASCET criteria unless otherwise specified.    FINDINGS:  NONCONTRAST HEAD CT:  INTRACRANIAL CONTENTS: Right temporal region heterogeneous hyperdense mass measures 5 x 48 x 49 mm, previously 44 x 42 x 41 mm. Similar surrounding vasogenic edema, near complete effacement of the right lateral ventricle and leftward midline shift   measuring 11  mm. No area of transependymal edema. Bilateral hyperdense fluid in the right greater than left posterior lateral ventricles.    VISUALIZED ORBITS/SINUSES/MASTOIDS: No intraorbital abnormality. No paranasal sinus mucosal disease. No middle ear or mastoid effusion.    BONES/SOFT TISSUES: Right-sided craniotomy/cranioplasty changes. No acute abnormality.    HEAD CTA:  ANTERIOR CIRCULATION: No occlusion, aneurysm, or high flow vascular malformation. Standard [place] of Fisher anatomy.    POSTERIOR CIRCULATION: No occlusion, aneurysm, or high flow vascular malformation. Patent basilar artery.     DURAL VENOUS SINUSES: Not well evaluated on a technical basis.    NECK CTA:  AORTIC ARCH: No high grade origin stenosis.    RIGHT CAROTID: No high grade stenosis or dissection.    LEFT CAROTID: No high grade stenosis or dissection.    VERTEBRAL ARTERIES: No high grade stenosis or dissection.    NONVASCULAR STRUCTURES: No acute finding. Clear lung apices.       Impression    IMPRESSION:   HEAD CT:  1.  Somewhat increased size of right temporal region parenchymal hemorrhage with similar surrounding vasogenic edema and mass effect/midline shift. No hydrocephalus.  2.  New acute hemorrhage in the posterior aspect of the right lateral ventricle suspicious for acute choroid plexus hemorrhage or intraparenchymal hemorrhage extension. Small focus of hemorrhage in the dependent left lateral ventricle as well.    HEAD CTA:   1.  Negative for large vessel occlusion.    NECK CTA:  1.  Negative for high-grade arterial stenosis or acute dissection.        Findings communicated to Fatou Brantley PA-C at 0957 hours and again at 1002 hours on 05/19/2025.    CTA Head Neck with Contrast    Narrative    EXAM: CT HEAD W/O CONTRAST, CTA HEAD NECK W CONTRAST  LOCATION: [facility]  DATE: 5/19/2025    INDICATION: Code Stroke, rule out hemorrhage and evaluate for potential thrombolysis thrombectomy. PLEASE READ  IMMEDIATELY.  COMPARISON: 05/14/2025, MRI 05/14/2025  CONTRAST: 67mL Isovue 370  TECHNIQUE: Head and neck CT angiogram with IV contrast. Noncontrast head CT followed by axial helical CT images of the head and neck vessels obtained during the arterial phase of intravenous contrast administration. Axial 2D reconstructed images and   multiplanar 3D MIP reconstructed images of the head and neck vessels were performed by the technologist. Dose reduction techniques were used. All stenosis measurements made according to NASCET criteria unless otherwise specified.    FINDINGS:  NONCONTRAST HEAD CT:  INTRACRANIAL CONTENTS: Right temporal region heterogeneous hyperdense mass measures 5 x 48 x 49 mm, previously 44 x 42 x 41 mm. Similar surrounding vasogenic edema, near complete effacement of the right lateral ventricle and leftward midline shift   measuring 11 mm. No area of transependymal edema. Bilateral hyperdense fluid in the right greater than left posterior lateral ventricles.    VISUALIZED ORBITS/SINUSES/MASTOIDS: No intraorbital abnormality. No paranasal sinus mucosal disease. No middle ear or mastoid effusion.    BONES/SOFT TISSUES: Right-sided craniotomy/cranioplasty changes. No acute abnormality.    HEAD CTA:  ANTERIOR CIRCULATION: No occlusion, aneurysm, or high flow vascular malformation. Standard [place] of Fisher anatomy.    POSTERIOR CIRCULATION: No occlusion, aneurysm, or high flow vascular malformation. Patent basilar artery.     DURAL VENOUS SINUSES: Not well evaluated on a technical basis.    NECK CTA:  AORTIC ARCH: No high grade origin stenosis.    RIGHT CAROTID: No high grade stenosis or dissection.    LEFT CAROTID: No high grade stenosis or dissection.    VERTEBRAL ARTERIES: No high grade stenosis or dissection.    NONVASCULAR STRUCTURES: No acute finding. Clear lung apices.       Impression    IMPRESSION:   HEAD CT:  1.  Somewhat increased size of right temporal region parenchymal hemorrhage with similar  surrounding vasogenic edema and mass effect/midline shift. No hydrocephalus.  2.  New acute hemorrhage in the posterior aspect of the right lateral ventricle suspicious for acute choroid plexus hemorrhage or intraparenchymal hemorrhage extension. Small focus of hemorrhage in the dependent left lateral ventricle as well.    HEAD CTA:   1.  Negative for large vessel occlusion.    NECK CTA:  1.  Negative for high-grade arterial stenosis or acute dissection.        Findings communicated to Fatou Brantley PA-C at 0957 hours and again at 1002 hours on 05/19/2025.    CT Head Perfusion w Contrast    Narrative    EXAM: CT HEAD PERFUSION W CONTRAST  LOCATION: [place] [facility]  DATE: 5/19/2025    INDICATION: Code Stroke to evaluate for potential thrombolysis and thrombectomy. Evaluate mismatch between penumbra and core infarct. PLEASE READ IMMEDIATELY.  COMPARISON: None.  TECHNIQUE: CT cerebral perfusion was performed utilizing a second contrast bolus. Perfusion data were post processed with generation of standard perfusion maps and estimation of ischemic/infarcted volumes utilizing standard threshold values. Dose   reduction techniques were used. All stenosis measurements made according to NASCET criteria unless otherwise specified.  CONTRAST: 50mL Isovue 370    FINDINGS:   CT PERFUSION:  PERFUSION MAPS: Asymmetric perfusion decrease involving the right temporal region of intraparenchymal hemorrhage. No suspected focal deficits in cerebral blood flow or volume to suggest ischemia/oligemia.    RAPID ANALYSIS: Unreliable secondary to the presence of intracranial hemorrhage. Reported reduction in CBF and elevation in Tmax of 43 mL and 77 mL in the region of hemorrhage not secondary to ischemia.       Impression    IMPRESSION: No core infarct is suspected. Perfusion anomalies as above, secondary to existing intraparenchymal hemorrhage and surrounding vasogenic edema.   CT Head w/o Contrast    Narrative     EXAM: CT HEAD W/O CONTRAST  LOCATION: [facility]  DATE: 5/19/2025    INDICATION: follow up stability scan for IPH  COMPARISON: CT head 5/19/2025 performed at 0944 hours, brain MRI 5/13/2025.  TECHNIQUE: Routine CT Head without IV contrast. Multiplanar reformats. Dose reduction techniques were used.    FINDINGS:   INTRACRANIAL CONTENTS: Stable postsurgical changes of right frontotemporal cranioplasty with underlying resection cavity. No significant change in 5.7 x 5.1 x 4.4 cm hemorrhagic right temporal lobe mass with surrounding vasogenic edema and regional mass   effect resulting in effacement of the right lateral ventricle and 12 mm leftward midline shift. Slightly increased moderate volume acute hyperdense intraventricular hemorrhage within the lateral ventricles. Patent basal cisterns. The ventricles are not   enlarged out of proportion to the cerebral sulci. No herniation or hydrocephalus. Stable 5 mm maximal thickness extra-axial fluid collection underlying the craniotomy defect. No evidence of acute infarction. Stable mild generalized brain parenchymal   volume loss.      VISUALIZED ORBITS/SINUSES/MASTOIDS: Prior bilateral cataract surgery. Visualized portions of the orbits are otherwise unremarkable. No paranasal sinus mucosal disease. No middle ear or mastoid effusion.    BONES/SOFT TISSUES: No acute abnormality.      Impression    IMPRESSION:  1.  Slightly increased moderate volume acute intraventricular hemorrhage within the lateral ventricles.  2.  Large ruptured acute hemorrhagic right temporal lobe mass with surrounding vasogenic edema and regional mass effect resulting and 12 mm leftward midline shift, not significantly changed since prior exam. No herniation or hydrocephalus.  3.  Stable thin extra axial fluid collection underlying the right frontotemporal cranioplasty defect.     *Note: Due to a large number of results and/or encounters for the requested time period, some  results have not been displayed. A complete set of results can be found in Results Review.       Discharge Medications   Discharge Medication List as of 5/25/2025 12:58 PM        START taking these medications    Details   dexAMETHasone (DECADRON) 1 MG tablet Take 4 tablets (4 mg) by mouth 2 times daily for 4 days, THEN 4 tablets (4 mg) daily for 7 days, THEN 2 tablets (2 mg) daily for 4 days, THEN 1 tablet (1 mg) daily for 3 days., Transitional      lisinopril (ZESTRIL) 5 MG tablet Take 1 tablet (5 mg) by mouth daily., Transitional           CONTINUE these medications which have CHANGED    Details   insulin glargine (LANTUS PEN) 100 UNIT/ML pen Inject 15 Units subcutaneously 2 times daily., TransitionalIf Lantus is not covered by insurance, may substitute Basaglar or Semglee or other insulin glargine product per insurance preference at same dose and frequency.        levETIRAcetam (KEPPRA) 1000 MG tablet Take 1 tablet (1,000 mg) by mouth 2 times daily., Transitional      polyethylene glycol (MIRALAX) 17 GM/Dose powder Take 17 g by mouth daily., Transitional      senna-docusate (SENOKOT-S/PERICOLACE) 8.6-50 MG tablet Take 1 tablet by mouth 2 times daily as needed for constipation., Transitional           CONTINUE these medications which have NOT CHANGED    Details   acetaminophen (TYLENOL) 325 MG tablet Take 2 tablets (650 mg) by mouth every 4 hours as needed for mild pain or fever., OTC      artificial saliva (BIOTENE MT) SOLN solution Take 1 spray by mouth at bedtime., OTC      atorvastatin (LIPITOR) 40 MG tablet TAKE 1 TABLET (40 MG) BY MOUTH DAILY, Disp-90 tablet, R-2, E-Prescribe      azelastine 137 MCG/SPRAY SOLN [place] 2 sprays into both nostrils 2 times daily., Disp-30 mL, R-1, E-Prescribe      carboxymethylcellulose PF (REFRESH PLUS) 0.5 % ophthalmic solution Place 1 drop into both eyes 2 times daily., OTC      cholecalciferol (VITAMIN D3) 1250 mcg (82350 units) capsule Take 1 capsule (1,250 mcg) by mouth  every 7 days., Disp-4 capsule, R-0, E-Prescribe      cyanocobalamin (VITAMIN B-12) 1000 MCG tablet Take 1 tablet (1,000 mcg) by mouth daily., Historical      fenofibrate (LOFIBRA) 54 MG tablet Take 1 tablet (54 mg) by mouth daily (with breakfast)., Disp-30 tablet, R-0, E-Prescribe      insulin aspart (NOVOLOG PEN) 100 UNIT/ML pen Inject 1-3 Units subcutaneously 3 times daily (before meals). Correction Scale - LOW INSULIN RESISTANCE DOSING   Do Not give Correction Insulin if Pre-Meal BG less than 140. For Pre-Meal  - 239 give 1 unit. For Pre-Meal  - 339 give 2 units. F or Pre-Meal BG greater than or equal to 340 give 3 units.  If patient is NOT eating a meal: Blood glucose should still be checked and correction (sliding scale) insulin to be administered within 30 minutes if order parameters are met., Disp-3 mL, R-0, E- Prescribe      levothyroxine (SYNTHROID/LEVOTHROID) 125 MCG tablet Take 1 tablet (125 mcg) by mouth daily, Disp-90 tablet, R-3, E-PrescribeStop refills of Levothyroxine 112mcg tabs      blood glucose (ACCU-CHEK NORMAN PLUS) test strip USE TO TEST BLOOD SUGAR 3 TIMES A DAY OR AS DIRECTED, Disp-100 strip, R-2, E-Prescribe      blood glucose calibration (ACCU-CHEK NORMAN) solution USE AS DIRECTED, Disp-1 each, R-0, E-PrescribeMedication is being filled for 1 time refill only due to:  Due for office visit      Continuous Glucose  (FREESTYLE DELFINA 2 READER) CHRYSTAL 1 Device continuously., Disp-1 each, R-1, E-Prescribe      Continuous Glucose Sensor (FREESTYLE DELFINA 2 SENSOR) [facility] APPLY 1 SENSOR AND CHANGE EVERY 14 DAYS AS DIRECTED, Disp-6 each, R-3, E-Prescribe      insulin pen needle (GLOBAL EASE INJECT PEN NEEDLES) 31G X 5 MM miscellaneous Use one pen needle 5 times a day, Disp-500 each, R-3, E-Prescribe      polyethylene glycol 400 (BLINK TEARS) 0.25 % SOLN ophthalmic solution Place 1 drop into both eyes daily., Historical           STOP taking these medications       Artificial Saliva  (ACT DRY MOUTH) JUAN DANIEL Comments:   Reason for Stopping:             Allergies   Allergies   Allergen Reactions    Omeprazole      diarrhea    Pantoprazole      diarrhea

## 2025-05-25 NOTE — PROGRESS NOTES
Piv removed.  All belongings including hearing aids, hearing aids case, clothing, radiation mask packed and sent with pt.  Pt to leave by wheel chair once transport arrives.

## 2025-05-27 ENCOUNTER — PATIENT OUTREACH (OUTPATIENT)
Dept: CARE COORDINATION | Facility: CLINIC | Age: 85
End: 2025-05-27
Payer: COMMERCIAL

## 2025-05-27 NOTE — PROGRESS NOTES
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    Plan: Care Coordinator will await notification from facility staff informing of patient's discharge plans/needs. Care Coordinator will review chart and outreach to facility staff every 4 weeks and as needed.     Miranda Solano  [facility]  Clinic Care Coordinator  [facility]  [phone]  [email or web address]

## 2025-05-27 NOTE — LETTER
[facility]   To:   [facility]          Please give to facility    From:   Miranda Solano  [facility]  Care Coordinator   [facility]   P: [phone]   [email or web address]   Patient Name:  Pascual Perea YOB: 1940   Admit date: 5/25/25      *Information Needed:  Please contact me when the patient will discharge (or if they will move to long term care)- include the discharge date, disposition, and main diagnosis   If the patient is discharged with home care services, please provide the name of the agency    Also- Please inform me if a care conference is being held.   Phone, Fax or Email with information                              Thank you          Electronically signed

## 2025-05-27 NOTE — PLAN OF CARE
Occupational Therapy Discharge Summary    Reason for therapy discharge:    Discharged to transitional care facility.    Progress towards therapy goal(s). See goals on Care Plan in [facility] electronic health record for goal details.  Goals partially met.  Barriers to achieving goals:   discharge from facility.    Therapy recommendation(s):      Continued therapy is recommended.  Rationale/Recommendations:   .OT Rationale for DC Rec: Pt significantly below baseline. Cancerous tumors in brain post resection, needs brain radiation, now increased bleed in brain presenting with L side weakness, poor midline orientation and poor insight. Needs min-mod A for mobility. Recommend TCU at d/c.    Writing therapist did not treat pt, note written based on previous treating therapist notes and recommendations. Please see chart and flow sheet for additional details.

## 2025-05-28 ENCOUNTER — TELEPHONE (OUTPATIENT)
Dept: ONCOLOGY | Facility: CLINIC | Age: 85
End: 2025-05-28
Payer: COMMERCIAL

## 2025-05-28 NOTE — TELEPHONE ENCOUNTER
----- Message from Florencia Butler sent at 5/20/2025  1:49 PM CDT -----  Eleanor: Schedule patient for next week with labs.    coby  ----- Message -----  From: Doron Lockwood MD  Sent: 5/20/2025   1:37 PM CDT  To: Florencia Butler MD; JAKI Spaulding CNP    This patient was admitted for a head bleed and had thrombocytopenia.  He did receive platelets.  His fibrinogen is normal.  He started radiation for small cell lung cancer extensive stage.  COBYA can you please get a follow-up scheduled for next week to recheck platelets and maybe follow-up with KOJO and see you back in a couple weeks.  He has a scheduled appointment in June with you but I think it would be nice for stability purposes for him to get his counts checked and see a PA posthospitalization thank you

## 2025-05-28 NOTE — TELEPHONE ENCOUNTER
Writer called and LVM with patient requesting a return call to schedule patient,    Brigitte Powell RN

## 2025-05-28 NOTE — PLAN OF CARE
Physical Therapy Discharge Summary    Reason for therapy discharge:    Discharged to transitional care facility.    Progress towards therapy goal(s). See goals on Care Plan in [facility] electronic health record for goal details.  Goals not met.  Barriers to achieving goals:   discharge from facility.    Therapy recommendation(s):    Continued therapy is recommended.  Rationale/Recommendations:  to advance indepdence with functional mobility..        Summary completed from chart review patient not seen by documenting therapist

## 2025-05-31 ENCOUNTER — HEALTH MAINTENANCE LETTER (OUTPATIENT)
Age: 85
End: 2025-05-31

## 2025-06-12 ENCOUNTER — DOCUMENTATION ONLY (OUTPATIENT)
Dept: OTHER | Facility: CLINIC | Age: 85
End: 2025-06-12
Payer: COMMERCIAL

## 2025-06-17 ENCOUNTER — LAB (OUTPATIENT)
Dept: INFUSION THERAPY | Facility: CLINIC | Age: 85
End: 2025-06-17
Attending: INTERNAL MEDICINE
Payer: COMMERCIAL

## 2025-06-17 ENCOUNTER — RESULTS FOLLOW-UP (OUTPATIENT)
Dept: ONCOLOGY | Facility: CLINIC | Age: 85
End: 2025-06-17

## 2025-06-17 ENCOUNTER — ONCOLOGY VISIT (OUTPATIENT)
Dept: ONCOLOGY | Facility: CLINIC | Age: 85
End: 2025-06-17
Attending: INTERNAL MEDICINE
Payer: COMMERCIAL

## 2025-06-17 VITALS
HEART RATE: 82 BPM | OXYGEN SATURATION: 98 % | HEIGHT: 71 IN | DIASTOLIC BLOOD PRESSURE: 72 MMHG | BODY MASS INDEX: 22.68 KG/M2 | WEIGHT: 162 LBS | SYSTOLIC BLOOD PRESSURE: 111 MMHG | RESPIRATION RATE: 18 BRPM

## 2025-06-17 DIAGNOSIS — C7A.1 HIGH GRADE NEUROENDOCRINE CARCINOMA (H): Primary | ICD-10-CM

## 2025-06-17 DIAGNOSIS — C79.31 MALIGNANT NEOPLASM METASTATIC TO BRAIN (H): ICD-10-CM

## 2025-06-17 LAB
ALBUMIN SERPL BCG-MCNC: 3.6 G/DL (ref 3.5–5.2)
ALP SERPL-CCNC: 66 U/L (ref 40–150)
ALT SERPL W P-5'-P-CCNC: 15 U/L (ref 0–70)
ANION GAP SERPL CALCULATED.3IONS-SCNC: 10 MMOL/L (ref 7–15)
AST SERPL W P-5'-P-CCNC: 16 U/L (ref 0–45)
BASOPHILS # BLD AUTO: 0 10E3/UL (ref 0–0.2)
BASOPHILS NFR BLD AUTO: 1 %
BILIRUB SERPL-MCNC: 1 MG/DL
BUN SERPL-MCNC: 10.3 MG/DL (ref 8–23)
CALCIUM SERPL-MCNC: 9.4 MG/DL (ref 8.8–10.4)
CHLORIDE SERPL-SCNC: 101 MMOL/L (ref 98–107)
CREAT SERPL-MCNC: 0.87 MG/DL (ref 0.67–1.17)
EGFRCR SERPLBLD CKD-EPI 2021: 86 ML/MIN/1.73M2
EOSINOPHIL # BLD AUTO: 0.1 10E3/UL (ref 0–0.7)
EOSINOPHIL NFR BLD AUTO: 3 %
ERYTHROCYTE [DISTWIDTH] IN BLOOD BY AUTOMATED COUNT: 16.4 % (ref 10–15)
GLUCOSE SERPL-MCNC: 313 MG/DL (ref 70–99)
HCO3 SERPL-SCNC: 27 MMOL/L (ref 22–29)
HCT VFR BLD AUTO: 30.2 % (ref 40–53)
HGB BLD-MCNC: 9.8 G/DL (ref 13.3–17.7)
IMM GRANULOCYTES # BLD: 0 10E3/UL
IMM GRANULOCYTES NFR BLD: 1 %
LYMPHOCYTES # BLD AUTO: 0.5 10E3/UL (ref 0.8–5.3)
LYMPHOCYTES NFR BLD AUTO: 16 %
MCH RBC QN AUTO: 29.4 PG (ref 26.5–33)
MCHC RBC AUTO-ENTMCNC: 32.5 G/DL (ref 31.5–36.5)
MCV RBC AUTO: 91 FL (ref 78–100)
MONOCYTES # BLD AUTO: 0.3 10E3/UL (ref 0–1.3)
MONOCYTES NFR BLD AUTO: 11 %
NEUTROPHILS # BLD AUTO: 2.1 10E3/UL (ref 1.6–8.3)
NEUTROPHILS NFR BLD AUTO: 69 %
NRBC # BLD AUTO: 0 10E3/UL
NRBC BLD AUTO-RTO: 0 /100
PLATELET # BLD AUTO: 53 10E3/UL (ref 150–450)
POTASSIUM SERPL-SCNC: 4.5 MMOL/L (ref 3.4–5.3)
PROT SERPL-MCNC: 5.8 G/DL (ref 6.4–8.3)
RBC # BLD AUTO: 3.33 10E6/UL (ref 4.4–5.9)
SODIUM SERPL-SCNC: 138 MMOL/L (ref 135–145)
WBC # BLD AUTO: 3 10E3/UL (ref 4–11)

## 2025-06-17 PROCEDURE — G0463 HOSPITAL OUTPT CLINIC VISIT: HCPCS | Performed by: INTERNAL MEDICINE

## 2025-06-17 PROCEDURE — 36415 COLL VENOUS BLD VENIPUNCTURE: CPT | Performed by: INTERNAL MEDICINE

## 2025-06-17 PROCEDURE — 99215 OFFICE O/P EST HI 40 MIN: CPT | Performed by: INTERNAL MEDICINE

## 2025-06-17 PROCEDURE — 80053 COMPREHEN METABOLIC PANEL: CPT | Performed by: INTERNAL MEDICINE

## 2025-06-17 PROCEDURE — 85004 AUTOMATED DIFF WBC COUNT: CPT | Performed by: INTERNAL MEDICINE

## 2025-06-17 ASSESSMENT — PAIN SCALES - GENERAL: PAINLEVEL_OUTOF10: NO PAIN (0)

## 2025-06-17 NOTE — PATIENT INSTRUCTIONS
-PET scan end of July 2025.  -Brain MRI end of July 2025.  -Labs today.  -Cancel CT scan scheduled for July 7,2025.  -See me after PET scan.

## 2025-06-17 NOTE — PROGRESS NOTES
"Oncology Rooming Note    June 17, 2025 8:47 AM   Pascual Perea is a 84 year old male who presents for:    Chief Complaint   Patient presents with    Oncology Clinic Visit     Initial Vitals: /72   Pulse 82   Resp 18   Ht 1.803 m (5' 11\")   Wt 73.5 kg (162 lb)   SpO2 98%   BMI 22.59 kg/m   Estimated body mass index is 22.59 kg/m  as calculated from the following:    Height as of this encounter: 1.803 m (5' 11\").    Weight as of this encounter: 73.5 kg (162 lb). Body surface area is 1.92 meters squared.  No Pain (0) Comment: Data Unavailable   No LMP for male patient.  Allergies reviewed: Yes  Medications reviewed: Yes    Medications: Medication refills not needed today.  Pharmacy name entered into [facility]:    [facility] - [place], [place] - [address]  [id number] - [place], [place] - [address]    Frailty Screening:   Is the patient here for a new oncology consult visit in cancer care? 2. No    PHQ9:  Did this patient require a PHQ9?: No          Tigist Jones MA            "

## 2025-06-17 NOTE — PROGRESS NOTES
ONCOLOGY HISTORY: Mr. 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     1.  CT neck on 06/20/2024 revealed 2.8 cm mass in left parotid gland and multiple enlarged left-sided cervical lymph node.  -Ultrasound-guided biopsy of left cervical mass on 06/28/2024 revealed METASTATIC HIGH-GRADE NEUROENDOCRINE CARCINOMA, LARGE CELL TYPE, at least 1.2 cm in linear extent, extending to biopsy margins.  -PET scan on 07/19/2024 revealed hypermetabolic left parotid mass and multiple hypermetabolic left cervical lymphadenopathy.  Severely stenotic left internal jugular vein secondary to mass effect.  -He was seen by ENT at [facility].  Flexible fiberoptic laryngoscopy on 07/15/2024 was normal.    2. Case discussed at tumor board at [facility].  Tumor board recommendation was to treat it as small cell lung cancer.  Recommendation was to give him 4 cycle of platinum and etoposide and add radiation with cycle 2 or 3.    3. Carboplatin and etoposide x 4 cycles between 08/14/2024 and 10/18/2024 (carboplatin chosen because of CKD, hearing loss and his age).  -Radiation to left parotid between 09/09/2024 and 10/21/2024.  6000 cGy in 30 fractions.    4.  PET scan on 12/23/2024 revealed complete remission.    5.  Presented to ER on 04/16/25:  - CT head on 04/16/25 revealed right temporal lobe mass with associated hemorrhage.  - CT neck, chest, abdomen and pelvis on 04/17/25 did not reveal any evidence of malignancy.    6.  On 04/22/2025, patient had craniotomy and resection of right temporal brain tumor.  Pathology revealed Metastatic small cell carcinoma.  -Patient received radiation to right temporal brain between 05/19/2025 and 05/23/2025.  3000 cGy in 5 fractions.    Subjective:   Son came with the patient.  Patient is currently in rehab.     Mr. Perea is an 84-year-old gentleman with high-grade neuroendocrine  carcinoma, large cell type, involving left parotid gland and left cervical lymph nodes. It was treated as limited stage  small cell lung cancer with concurrent chemoradiation. He completed 4 cycle of carboplatin and etoposide on 10/18/2024.  He completed radiation on 10/21/2024. PET scan on 12/23/2024 revealed complete metabolic response.     Patient was found to have recurrence in brain in April 2025.  He had resection of right temporal brain tumor.  Pathology revealed metastatic small cell carcinoma.  He received radiation last month.    Patient is currently in rehab.  Patient is weak.  He walks with the help of a walker.  He needs help with activities of daily living.    No headache.  He gets some dizziness when he stands.  No chest pain.  No shortness of breath.  No nausea.  No vomiting.  No bleeding.  He has mild numbness and tingling in the extremities which are stable.    As per the son, patient's overall condition is slowly improving.  He is getting little stronger.    Exam:  He is alert and oriented.  He looked very weak.  Vitals: Reviewed.  Rest of the system is not examined.     Labs: CBC and CMP reviewed.     Assessment:  1.  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  He achieved complete remission.  Now he had recurrence in the brain.  He is status post craniotomy and resection of brain mass and radiation.  2.  Generalized weakness.  3.  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  4.  Grade 1 peripheral neuropathy, stable.     Plan:  -PET scan end of July 2025.  -Brain MRI end of July 2025.  -Labs today.  -Cancel CT scan scheduled for July 7,2025.  -See me after PET scan.     Discussion:  1.  Discussed with the patient and son regarding recurrent malignancy.  In 2024, he had high-grade neuroendocrine carcinoma, large cell type, involving left parotid  gland and left cervical lymph node.  He received concurrent chemoradiation and achieved complete remission.  Now he had recurrence in the brain and had craniotomy done.  Pathology revealed small cell carcinoma.  He has received postop radiation.    He had CT neck, chest, abdomen and pelvis in April.  No evidence of malignancy.  Recurrence of malignancy localized to brain.    2.  Discussed regarding further treatment.  No need for any chemotherapy as there is no evidence of disease outside the brain.    3.  Discussed regarding follow-up with scans.  Will get PET scan in July. Will also get brain MRI end of July.    I asked the patient if he will take systemic treatment if he has recurrence of disease.  Patient unable to make a decision. Son is going to discuss with patient and other family members and let us know of the decision.  If patient decides that he will not take any further treatment if cancer comes back, then we do not need to continue doing follow-up with scans.    4.  Labs done today reviewed.  CBC reveals pancytopenia.  This is from previous chemoradiation and also from recent radiation.  He is not neutropenic.  Pancytopenia should not cause any complication.    5.  Son had few questions which were all answered.  I will see the patient after the PET scan.    Total visit time of 40 minutes.  Time spent in today's visit, review of chart/investigations today and documentation today.

## 2025-06-17 NOTE — LETTER
"6/17/2025      Pascual Perea  [address]  [facility] [address]      Dear Colleague,    Thank you for referring your patient, Pascual Perea, to the [facility]. Please see a copy of my visit note below.    Oncology Rooming Note    June 17, 2025 8:47 AM   Pascual Perea is a 84 year old male who presents for:    Chief Complaint   Patient presents with     Oncology Clinic Visit     Initial Vitals: /72   Pulse 82   Resp 18   Ht 1.803 m (5' 11\")   Wt 73.5 kg (162 lb)   SpO2 98%   BMI 22.59 kg/m   Estimated body mass index is 22.59 kg/m  as calculated from the following:    Height as of this encounter: 1.803 m (5' 11\").    Weight as of this encounter: 73.5 kg (162 lb). Body surface area is 1.92 meters squared.  No Pain (0) Comment: Data Unavailable   No LMP for male patient.  Allergies reviewed: Yes  Medications reviewed: Yes    Medications: Medication refills not needed today.  Pharmacy name entered into [employer]:    [facility] - [place], [place] - [address] [id number] - [place], [place] - [address]    Frailty Screening:   Is the patient here for a new oncology consult visit in cancer care? 2. No    PHQ9:  Did this patient require a PHQ9?: No          Tigist Jones MA              ONCOLOGY HISTORY: Mr. 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     1.  CT neck on 06/20/2024 revealed 2.8 cm mass in left parotid gland and multiple enlarged left-sided cervical lymph node.  -Ultrasound-guided biopsy of left cervical mass on 06/28/2024 revealed METASTATIC HIGH-GRADE NEUROENDOCRINE CARCINOMA, LARGE CELL TYPE, at least 1.2 cm in linear extent, extending to biopsy margins.  -PET scan on 07/19/2024 revealed hypermetabolic left parotid mass and multiple hypermetabolic left cervical lymphadenopathy.  Severely stenotic " left internal jugular vein secondary to mass effect.  -He was seen by ENT at [facility].  Flexible fiberoptic laryngoscopy on 07/15/2024 was normal.    2. Case discussed at tumor board at [facility].  Tumor board recommendation was to treat it as small cell lung cancer.  Recommendation was to give him 4 cycle of platinum and etoposide and add radiation with cycle 2 or 3.    3. Carboplatin and etoposide x 4 cycles between 08/14/2024 and 10/18/2024 (carboplatin chosen because of CKD, hearing loss and his age).  -Radiation to left parotid between 09/09/2024 and 10/21/2024.  6000 cGy in 30 fractions.    4.  PET scan on 12/23/2024 revealed complete remission.    5.  Presented to ER on 04/16/25:  - CT head on 04/16/25 revealed right temporal lobe mass with associated hemorrhage.  - CT neck, chest, abdomen and pelvis on 04/17/25 did not reveal any evidence of malignancy.    6.  On 04/22/2025, patient had craniotomy and resection of right temporal brain tumor.  Pathology revealed Metastatic small cell carcinoma.  -Patient received radiation to right temporal brain between 05/19/2025 and 05/23/2025.  3000 cGy in 5 fractions.    Subjective:   Son came with the patient.  Patient is currently in rehab.     Mr. 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 It was treated as limited stage  small cell lung cancer with concurrent chemoradiation. He completed 4 cycle of carboplatin and etoposide on 10/18/2024.  He completed radiation on 10/21/2024. PET scan on 12/23/2024 revealed complete metabolic response.     Patient was found to have recurrence in brain in April 2025.  He had resection of right temporal brain tumor.  Pathology revealed metastatic small cell carcinoma.  He received radiation last month.    Patient is currently in rehab.  Patient is weak.  He walks with the help of a walker.  He  needs help with activities of daily living.    No headache.  He gets some dizziness when he stands.  No chest pain.  No shortness of breath.  No nausea.  No vomiting.  No bleeding.  He has mild numbness and tingling in the extremities which are stable.    As per the son, patient's overall condition is slowly improving.  He is getting little stronger.    Exam:  He is alert and oriented.  He looked very weak.  Vitals: Reviewed.  Rest of the system is not examined.     Labs: CBC and CMP reviewed.     Assessment:  1.  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  He achieved complete remission.  Now he had recurrence in the brain.  He is status post craniotomy and resection of brain mass and radiation.  2.  Generalized weakness.  3.  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  4.  Grade 1 peripheral neuropathy, stable.     Plan:  -PET scan end of July 2025.  -Brain MRI end of July 2025.  -Labs today.  -Cancel CT scan scheduled for July 7,2025.  -See me after PET scan.     Discussion:  1.  Discussed with the patient and son regarding recurrent malignancy.  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  He received concurrent chemoradiation and achieved complete remission.  Now he had recurrence in the brain and had craniotomy done.  Pathology revealed small cell carcinoma.  He has received postop radiation.    He had CT neck, chest, abdomen and pelvis in April.  No evidence of malignancy.  Recurrence of malignancy localized to brain.    2.  Discussed regarding further treatment.  No need for any chemotherapy as there is no evidence of disease outside the brain.    3.  Discussed regarding follow-up with scans.  Will get PET scan in July. Will also get brain MRI end of July.    I asked the  patient if he will take systemic treatment if he has recurrence of disease.  Patient unable to make a decision. Son is going to discuss with patient and other family members and let us know of the decision.  If patient decides that he will not take any further treatment if cancer comes back, then we do not need to continue doing follow-up with scans.    4.  Labs done today reviewed.  CBC reveals pancytopenia.  This is from previous chemoradiation and also from recent radiation.  He is not neutropenic.  Pancytopenia should not cause any complication.    5.  Son had few questions which were all answered.  I will see the patient after the PET scan.    Total visit time of 40 minutes.  Time spent in today's visit, review of chart/investigations today and documentation today.    Again, thank you for allowing me to participate in the care of your patient.        Sincerely,        Florencia Butler MD    Electronically signed

## 2025-06-23 ENCOUNTER — TELEPHONE (OUTPATIENT)
Dept: NEUROSURGERY | Facility: CLINIC | Age: 85
End: 2025-06-23
Payer: COMMERCIAL

## 2025-06-23 ENCOUNTER — OFFICE VISIT (OUTPATIENT)
Dept: NEUROLOGY | Facility: CLINIC | Age: 85
End: 2025-06-23
Attending: NURSE PRACTITIONER
Payer: COMMERCIAL

## 2025-06-23 VITALS
DIASTOLIC BLOOD PRESSURE: 67 MMHG | HEIGHT: 71 IN | BODY MASS INDEX: 22.68 KG/M2 | WEIGHT: 162 LBS | TEMPERATURE: 97.8 F | HEART RATE: 80 BPM | SYSTOLIC BLOOD PRESSURE: 105 MMHG

## 2025-06-23 DIAGNOSIS — G93.89 BRAIN MASS: ICD-10-CM

## 2025-06-23 DIAGNOSIS — R56.9 SEIZURES (H): ICD-10-CM

## 2025-06-23 DIAGNOSIS — Z98.890 S/P CRANIOTOMY: ICD-10-CM

## 2025-06-23 NOTE — PROGRESS NOTES
[facility] Epilepsy Care History and Physical       Patient:  Pascual Perea  :  1940   Age:  84 year old   Today's Office Visit:  2025    Referring Provider:    Referred Self, MD  No address on file      History of Present Illness:  Pascual Perea with right temporal hemorrhagic metastatic brain tumor, s/p resection. Presented with left sided weakness.   He apparently had 2 seizures postoperatively and was started on LEV. No seizure since.                                                                                                                      VEEG during last admission showed moderate encephalopathy and focal slowing in the right temporal region. No electrographic seizures or epileptiform discharges were recorded.    He is now in rehab. Left sided weakness resolved.                            Epilepsy Risk Factors:  Brain Tumor  Precipitating factors:   NONE  Past Medical History:   Diagnosis Date    Adhesive capsulitis of shoulder     Allergic rhinitis 2008    Problem list name updated by automated process. Provider to review      Anemia 03/10/2014    Anemia, unspecified     Antiplatelet or antithrombotic long-term use     Arrhythmia     Atrial fib/flutter    Atrial fibrillation, unspecified type (H) 2018    CAD (coronary artery disease)     Chemotherapy-induced neutropenia 2024    Coronary artery disease involving native coronary artery of native heart without angina pectoris 2025    Coronary atherosclerosis     Nuc Stress 4/15/16: On both stress and rest images there is a very small size mild intensity apical photopenic defect which is of equal extent and intensity on both stress and rest images. Gated images demonstrated no regional wall motion abnormalities. The left ventricular ejection fraction was calculated to be 61% with stress.      Essential hypertension, benign 2003    Gastroesophageal reflux disease without esophagitis  04/27/2016    Gastroparesis     delayed emptying study May 2022    High grade neuroendocrine carcinoma (H) 07/19/2024    History of cardioversion 04/24/2018    a single synchronized shock of 120 joules restored normal sinus rhythm    History of cardioversion 02/13/2019    single shock , 200 joules successul in restoring NSR    History of colonic polyps 03/24/2017    Hyperlipidemia LDL goal <70 05/26/2011    Hypertension     Hypothyroidism     Hypothyroidism, unspecified type 08/15/2016    Impotence of organic origin     Laceration of finger 06/2010     left thumb s/p sutures    Numbness and tingling     diabetic neuropathy bilateral feet    BRANDON (obstructive sleep apnea)     intolerant of CPAP, uses it occasionally.  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 disease, with long-term current use of insulin (H) 02/16/2025    Type 2 diabetes mellitus without complication  (goal A1C<8) 10/24/2015    Typical atrial flutter (H) 04/18/2016    Unspecified hypothyroidism     Vitamin D deficiency 09/03/2011      Past Surgical History:   Procedure Laterality Date    ANESTHESIA CARDIOVERSION N/A 02/13/2019    Procedure: ANESTHESIA CARDIOVERSION;  Surgeon: GENERIC ANESTHESIA PROVIDER;  Location:  OR    ANESTHESIA CARDIOVERSION N/A 05/22/2019    Procedure: ANESTHESIA, FOR CARDIOVERSION;  Surgeon: GENERIC ANESTHESIA PROVIDER;  Location:  OR    CARDIAC SURGERY      bypass in 1997    CARDIOVERSION  04/24/2018    COLONOSCOPY      CORONARY ANGIOGRAPHY ADULT ORDER      4/2/2012    CORONARY ARTERY BYPASS  1997    [facility]  to LAD    ENDOSCOPIC ULTRASOUND UPPER GASTROINTESTINAL TRACT (GI) N/A 03/14/2019    Procedure: ENDOSCOPIC ULTRASOUND OF UPPER GASTROINTESTINAL TRACT;  Surgeon: Ju Torres MD;  Location:  OR    ESOPHAGOSCOPY, GASTROSCOPY, DUODENOSCOPY (EGD), COMBINED N/A 4/15/2022    Procedure: ESOPHAGOGASTRODUODENOSCOPY (EGD);  Surgeon: Erik Baca DO;  Location:  GI    EXCISE MASS HEAD Left 08/18/2016    Procedure: EXCISE MASS HEAD;  Surgeon: Ivan Hernandez MD;  Location: [facility]    HEART CATH, ANGIOPLASTY  04/2012    IR CHEST PORT PLACEMENT > 5 YRS OF AGE  8/9/2024    IR PORT REMOVAL RIGHT  2/17/2025    LAPAROSCOPIC CHOLECYSTECTOMY N/A 03/17/2019    Procedure: LAPAROSCOPIC CHOLECYSTECTOMY;  Surgeon: Janel Paz MD;  Location:  OR    OPTICAL TRACKING SYSTEM CRANIOTOMY, EXCISE TUMOR, COMBINED Right 4/22/2025    Procedure: RIGHT FRONTO-TEMPORAL CRANIOTOMY, USING OPTICAL TRACKING SYSTEM, WITH NEOPLASM EXCISION;  Surgeon: Davie Erickson MD;  Location:  OR    PHACOEMULSIFICATION CLEAR CORNEA WITH STANDARD INTRAOCULAR LENS IMPLANT Left 06/08/2015    Procedure: PHACOEMULSIFICATION CLEAR CORNEA WITH STANDARD INTRAOCULAR LENS IMPLANT;  Surgeon: Nixon Farnsworth MD;  Location:  EC    PHACOEMULSIFICATION CLEAR CORNEA WITH STANDARD INTRAOCULAR LENS IMPLANT Right 06/22/2015    Procedure: PHACOEMULSIFICATION CLEAR CORNEA WITH STANDARD INTRAOCULAR LENS IMPLANT;  Surgeon: Nixon Farnsworth MD;  Location:  EC    SEPTOPLASTY, TURBINOPLASTY, COMBINED Bilateral 08/18/2016    Procedure: COMBINED SEPTOPLASTY, TURBINOPLASTY;  Surgeon: Ivan Hernandez MD;  Location: [facility]    ZZC NONSPECIFIC PROCEDURE  1997    CABG ([place])    Z NONSPECIFIC PROCEDURE  08/2001    stress echo     Family History   Problem Relation Age of Onset    Genitourinary Problems Father         d: age 89; ARF    Hypertension Father     Diabetes Mother         d: age 68, CAD    Heart Disease Mother         MI    Myocardial  Infarction Mother     Cardiovascular Brother         d:  age 31; CAD    Heart Disease Brother         age 31, MI    Diabetes Sister         b:  1939; DM2    Diabetes Sister       Social History     Socioeconomic History    Marital status:      Spouse name: None    Number of children: None    Years of education: None    Highest education level: None   Tobacco Use    Smoking status: Former     Current packs/day: 0.00     Average packs/day: 1 pack/day for 6.5 years (6.5 ttl pk-yrs)     Types: Cigarettes, Cigars, Pipe     Start date:      Quit date: 1964     Years since quittin.0    Smokeless tobacco: Never    Tobacco comments:     10/16/24 Pt states he will an occasional cigar.    Vaping Use    Vaping status: Never Used   Substance and Sexual Activity    Alcohol use: Not Currently     Comment: couple drinks a year    Drug use: No    Sexual activity: Yes     Partners: Female     Birth control/protection: Abstinence   Other Topics Concern    Parent/sibling w/ CABG, MI or angioplasty before 65F 55M? No    Caffeine Concern Yes     Comment: 2 cups coffee a day    Sleep Concern Yes     Comment: sleep apnea, wears cpap off and on    Stress Concern No    Weight Concern No    Special Diet Yes     Comment: diabetic diet     Exercise No     Comment: occ walk the mall    Seat Belt Yes     Social Drivers of Health     Financial Resource Strain: Low Risk  (2025)    Financial Resource Strain     Within the past 12 months, have you or your family members you live with been unable to get utilities (heat, electricity) when it was really needed?: No   Food Insecurity: Low Risk  (2025)    Food Insecurity     Within the past 12 months, did you worry that your food would run out before you got money to buy more?: No     Within the past 12 months, did the food you bought just not last and you didn t have money to get more?: No   Transportation Needs: Low Risk  (2025)    Transportation Needs     Within the  past 12 months, has lack of transportation kept you from medical appointments, getting your medicines, non-medical meetings or appointments, work, or from getting things that you need?: No   Physical Activity: Insufficiently Active (2/9/2025)    Exercise Vital Sign     Days of Exercise per Week: 7 days     Minutes of Exercise per Session: 20 min   Stress: No Stress Concern Present (2/9/2025)    [employer] - Occupational Stress Questionnaire     Feeling of Stress : Not at all   Social Connections: Unknown (2/9/2025)    Social Connection and Isolation Panel [NHANES]     Frequency of Social Gatherings with Friends and Family: Three times a week   Interpersonal Safety: Low Risk  (5/15/2025)    Interpersonal Safety     Do you feel physically and emotionally safe where you currently live?: Yes     Within the past 12 months, have you been hit, slapped, kicked or otherwise physically hurt by someone?: No     Within the past 12 months, have you been humiliated or emotionally abused in other ways by your partner or ex-partner?: No   Housing Stability: Low Risk  (5/12/2025)    Housing Stability     Do you have housing? : Yes     Are you worried about losing your housing?: No     Current Outpatient Medications   Medication Sig Dispense Refill    acetaminophen (TYLENOL) 325 MG tablet Take 2 tablets (650 mg) by mouth every 4 hours as needed for mild pain or fever.      atorvastatin (LIPITOR) 40 MG tablet TAKE 1 TABLET (40 MG) BY MOUTH DAILY 90 tablet 2    azelastine 137 MCG/SPRAY SOLN [place] 2 sprays into both nostrils 2 times daily. 30 mL 1    artificial saliva (BIOTENE MT) SOLN solution Take 1 spray by mouth at bedtime.      blood glucose (ACCU-CHEK NORMAN PLUS) test strip USE TO TEST BLOOD SUGAR 3 TIMES A DAY OR AS DIRECTED 100 strip 2    blood glucose calibration (ACCU-CHEK NORMAN) solution USE AS DIRECTED 1 each 0    carboxymethylcellulose PF (REFRESH PLUS) 0.5 % ophthalmic solution Place 1 drop  into both eyes 2 times daily.      cholecalciferol (VITAMIN D3) 1250 mcg (61576 units) capsule Take 1 capsule (1,250 mcg) by mouth every 7 days. 4 capsule 0    Continuous Glucose  (FREESTYLE DELFINA 2 READER) CHRYSTAL 1 Device continuously. 1 each 1    Continuous Glucose Sensor (FREESTYLE DELFINA 2 SENSOR) [facility] APPLY 1 SENSOR AND CHANGE EVERY 14 DAYS AS DIRECTED 6 each 3    cyanocobalamin (VITAMIN B-12) 1000 MCG tablet Take 1 tablet (1,000 mcg) by mouth daily.      dexAMETHasone (DECADRON) 1 MG tablet Take 4 tablets (4 mg) by mouth 2 times daily for 4 days, THEN 4 tablets (4 mg) daily for 7 days, THEN 2 tablets (2 mg) daily for 4 days, THEN 1 tablet (1 mg) daily for 3 days.      fenofibrate (LOFIBRA) 54 MG tablet Take 1 tablet (54 mg) by mouth daily (with breakfast). 30 tablet 0    insulin aspart (NOVOLOG PEN) 100 UNIT/ML pen Inject 1-3 Units subcutaneously 3 times daily (before meals). Correction Scale - LOW INSULIN RESISTANCE DOSING   Do Not give Correction Insulin if Pre-Meal BG less than 140. For Pre-Meal  - 239 give 1 unit. For Pre-Meal  - 339 give 2 units. For Pre-Meal BG greater than or equal to 340 give 3 units.  If patient is NOT eating a meal: Blood glucose should still be checked and correction (sliding scale) insulin to be administered within 30 minutes if order parameters are met. 3 mL 0    insulin glargine (LANTUS PEN) 100 UNIT/ML pen Inject 15 Units subcutaneously 2 times daily.      insulin pen needle (GLOBAL EASE INJECT PEN NEEDLES) 31G X 5 MM miscellaneous Use one pen needle 5 times a day 500 each 3    levETIRAcetam (KEPPRA) 1000 MG tablet Take 1 tablet (1,000 mg) by mouth 2 times daily.      levothyroxine (SYNTHROID/LEVOTHROID) 125 MCG tablet Take 1 tablet (125 mcg) by mouth daily 90 tablet 3    lisinopril (ZESTRIL) 5 MG tablet Take 1 tablet (5 mg) by mouth daily.      polyethylene glycol (MIRALAX) 17 GM/Dose powder Take 17 g by mouth daily.      polyethylene glycol 400 (BLINK TEARS)  "0.25 % SOLN ophthalmic solution Place 1 drop into both eyes daily.      senna-docusate (SENOKOT-S/PERICOLACE) 8.6-50 MG tablet Take 1 tablet by mouth 2 times daily as needed for constipation.         Perceived AED Side Effects: No    Past AEDs:      6/23/2025     9:11 AM   AED - ANTIEPILEPTIC DRUGS   levETIRAcetam 1,000 mg BID PO          Medication marked as long-term         Exam:    Ht 5' 11\" (180.3 cm)   Wt 162 lb (73.5 kg)   BMI 22.59 kg/m       Wt Readings from Last 5 Encounters:   06/23/25 162 lb (73.5 kg)   06/17/25 162 lb (73.5 kg)   05/19/25 172 lb 2.9 oz (78.1 kg)   05/07/25 176 lb 5.9 oz (80 kg)   04/26/25 183 lb 13.8 oz (83.4 kg)       Assessment and Plan:   Pascual Perea presented with metastatic brain tumor and seizures. He was started on LEV and has not had recurrence. This medication should be continued indefinitely and appears to be well tolerated. I note the chronic thrombopenia.    No further workup or follow is needed.    I spent 30 minutes on the date of the encounter doing chart review, history and exam, documentation and further activities as noted above.     "

## 2025-06-23 NOTE — LETTER
2025       RE: Pascual Perea  : 1940   MRN: [id number]      Dear Colleague,    Thank you for referring your patient, Pascual Perea, to the [facility] EPILEPSY CARE at [facility]. Please see a copy of my visit note below.    [facility] Epilepsy Care History and Physical       Patient:  Pascual Perea  :  1940   Age:  84 year old   Today's Office Visit:  2025    Referring Provider:    Referred Self, MD  No address on file      History of Present Illness:  Pascual Perea with right temporal hemorrhagic metastatic brain tumor, s/p resection. Presented with left sided weakness.   He apparently had 2 seizures postoperatively and was started on LEV. No seizure since.                                                                                                                      VEEG during last admission showed moderate encephalopathy and focal slowing in the right temporal region. No electrographic seizures or epileptiform discharges were recorded.    He is now in rehab. Left sided weakness resolved.                            Epilepsy Risk Factors:  Brain Tumor  Precipitating factors:   NONE  Past Medical History:   Diagnosis Date     Adhesive capsulitis of shoulder      Allergic rhinitis 2008    Problem list name updated by automated process. Provider to review       Anemia 03/10/2014     Anemia, unspecified      Antiplatelet or antithrombotic long-term use      Arrhythmia     Atrial fib/flutter     Atrial fibrillation, unspecified type (H) 2018     CAD (coronary artery disease)      Chemotherapy-induced neutropenia 2024     Coronary artery disease involving native coronary artery of native heart without angina pectoris 2025     Coronary atherosclerosis     Nuc Stress 4/15/16: On both stress and rest images there is a very small size mild intensity apical photopenic defect which is of  equal extent and intensity on both stress and rest images. Gated images demonstrated no regional wall motion abnormalities. The left ventricular ejection fraction was calculated to be 61% with stress.       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     History of cardioversion 04/24/2018    a single synchronized shock of 120 joules restored normal sinus rhythm     History of cardioversion 02/13/2019    single shock , 200 joules successul in restoring NSR     History of colonic polyps 03/24/2017     Hyperlipidemia LDL goal <70 05/26/2011     Hypertension      Hypothyroidism      Hypothyroidism, unspecified type 08/15/2016     Impotence of organic origin      Laceration of finger 06/2010     left thumb s/p sutures     Numbness and tingling     diabetic neuropathy bilateral feet     BRANDON (obstructive sleep apnea)     intolerant of CPAP, uses it occasionally.  Using mandibular device occasionally     Osteopenia      Other cirrhosis of liver (H) 04/14/2022     Other dietary vitamin B12 deficiency anemia 12/10/2024     Pulmonary hypertension (H) 04/06/2012     Sensorineural hearing loss, unspecified      Stage 3a chronic kidney disease (H) 02/16/2025     Stented coronary artery 1997    CABG      Testosterone deficiency      Thrombocytopenia 02/16/2025     Type 2 diabetes mellitus with stage 3a chronic kidney disease, with long-term current use of insulin (H) 02/16/2025     Type 2 diabetes mellitus without complication  (goal A1C<8) 10/24/2015     Typical atrial flutter (H) 04/18/2016     Unspecified hypothyroidism      Vitamin D deficiency 09/03/2011      Past Surgical History:   Procedure Laterality Date     ANESTHESIA CARDIOVERSION N/A 02/13/2019    Procedure: ANESTHESIA CARDIOVERSION;  Surgeon: GENERIC ANESTHESIA PROVIDER;  Location:  OR      ANESTHESIA CARDIOVERSION N/A 05/22/2019    Procedure: ANESTHESIA, FOR CARDIOVERSION;  Surgeon: GENERIC ANESTHESIA PROVIDER;  Location:  OR     CARDIAC SURGERY      bypass in 1997     CARDIOVERSION  04/24/2018     COLONOSCOPY       CORONARY ANGIOGRAPHY ADULT ORDER      4/2/2012     CORONARY ARTERY BYPASS  1997    [facility]     ENDOSCOPIC ULTRASOUND UPPER GASTROINTESTINAL TRACT (GI) N/A 03/14/2019    Procedure: ENDOSCOPIC ULTRASOUND OF UPPER GASTROINTESTINAL TRACT;  Surgeon: Ju Torres MD;  Location:  OR     ESOPHAGOSCOPY, GASTROSCOPY, DUODENOSCOPY (EGD), COMBINED N/A 4/15/2022    Procedure: ESOPHAGOGASTRODUODENOSCOPY (EGD);  Surgeon: Erik Baca DO;  Location:  GI     EXCISE MASS HEAD Left 08/18/2016    Procedure: EXCISE MASS HEAD;  Surgeon: Ivan Hernandez MD;  Location:  SD     HEART CATH, ANGIOPLASTY  04/2012     IR CHEST PORT PLACEMENT > 5 YRS OF AGE  8/9/2024     IR PORT REMOVAL RIGHT  2/17/2025     LAPAROSCOPIC CHOLECYSTECTOMY N/A 03/17/2019    Procedure: LAPAROSCOPIC CHOLECYSTECTOMY;  Surgeon: Janel Paz MD;  Location:  OR     OPTICAL TRACKING SYSTEM CRANIOTOMY, EXCISE TUMOR, COMBINED Right 4/22/2025    Procedure: RIGHT FRONTO-TEMPORAL CRANIOTOMY, USING OPTICAL TRACKING SYSTEM, WITH NEOPLASM EXCISION;  Surgeon: Davie Erickson MD;  Location:  OR     PHACOEMULSIFICATION CLEAR CORNEA WITH STANDARD INTRAOCULAR LENS IMPLANT Left 06/08/2015    Procedure: PHACOEMULSIFICATION CLEAR CORNEA WITH STANDARD INTRAOCULAR LENS IMPLANT;  Surgeon: Nixon Farnsworth MD;  Location:  EC     PHACOEMULSIFICATION CLEAR CORNEA WITH STANDARD INTRAOCULAR LENS IMPLANT Right 06/22/2015    Procedure: PHACOEMULSIFICATION CLEAR CORNEA WITH STANDARD INTRAOCULAR LENS IMPLANT;  Surgeon: Nixon Farnsworth MD;  Location:  EC     SEPTOPLASTY, TURBINOPLASTY, COMBINED Bilateral 08/18/2016    Procedure: COMBINED SEPTOPLASTY, TURBINOPLASTY;  Surgeon: Ivan Hernandez  MD;  Location: [facility] NONSPECIFIC PROCEDURE      CABG (Caodaism)     [facility] NONSPECIFIC PROCEDURE  2001    stress echo     Family History   Problem Relation Age of Onset     Genitourinary Problems Father         d: age 89; ARF     Hypertension Father      Diabetes Mother         d: age 68, CAD     Heart Disease Mother         MI     Myocardial Infarction Mother      Cardiovascular Brother         d:  age 31; CAD     Heart Disease Brother         age 31, MI     Diabetes Sister         b:  1939; DM2     Diabetes Sister       Social History     Socioeconomic History     Marital status:      Spouse name: None     Number of children: None     Years of education: None     Highest education level: None   Tobacco Use     Smoking status: Former     Current packs/day: 0.00     Average packs/day: 1 pack/day for 6.5 years (6.5 ttl pk-yrs)     Types: Cigarettes, Cigars, Pipe     Start date:      Quit date: 1964     Years since quittin.0     Smokeless tobacco: Never     Tobacco comments:     10/16/24 Pt states he will an occasional cigar.    Vaping Use     Vaping status: Never Used   Substance and Sexual Activity     Alcohol use: Not Currently     Comment: couple drinks a year     Drug use: No     Sexual activity: Yes     Partners: Female     Birth control/protection: Abstinence   Other Topics Concern     Parent/sibling w/ CABG, MI or angioplasty before 65F 55M? No     Caffeine Concern Yes     Comment: 2 cups coffee a day     Sleep Concern Yes     Comment: sleep apnea, wears cpap off and on     Stress Concern No     Weight Concern No     Special Diet Yes     Comment: diabetic diet      Exercise No     Comment: occ walk the mall     Seat Belt Yes     Social Drivers of Health     Financial Resource Strain: Low Risk  (2025)    Financial Resource Strain      Within the past 12 months, have you or your family members you live with been unable to get utilities (heat, electricity) when it was really  needed?: No   Food Insecurity: Low Risk  (5/12/2025)    Food Insecurity      Within the past 12 months, did you worry that your food would run out before you got money to buy more?: No      Within the past 12 months, did the food you bought just not last and you didn t have money to get more?: No   Transportation Needs: Low Risk  (5/12/2025)    Transportation Needs      Within the past 12 months, has lack of transportation kept you from medical appointments, getting your medicines, non-medical meetings or appointments, work, or from getting things that you need?: No   Physical Activity: Insufficiently Active (2/9/2025)    Exercise Vital Sign      Days of Exercise per Week: 7 days      Minutes of Exercise per Session: 20 min   Stress: No Stress Concern Present (2/9/2025)    [place] [place] [employer] - Occupational Stress Questionnaire      Feeling of Stress : Not at all   Social Connections: Unknown (2/9/2025)    Social Connection and Isolation Panel [NHANES]      Frequency of Social Gatherings with Friends and Family: Three times a week   Interpersonal Safety: Low Risk  (5/15/2025)    Interpersonal Safety      Do you feel physically and emotionally safe where you currently live?: Yes      Within the past 12 months, have you been hit, slapped, kicked or otherwise physically hurt by someone?: No      Within the past 12 months, have you been humiliated or emotionally abused in other ways by your partner or ex-partner?: No   Housing Stability: Low Risk  (5/12/2025)    Housing Stability      Do you have housing? : Yes      Are you worried about losing your housing?: No     Current Outpatient Medications   Medication Sig Dispense Refill     acetaminophen (TYLENOL) 325 MG tablet Take 2 tablets (650 mg) by mouth every 4 hours as needed for mild pain or fever.       atorvastatin (LIPITOR) 40 MG tablet TAKE 1 TABLET (40 MG) BY MOUTH DAILY 90 tablet 2     azelastine 137 MCG/SPRAY SOLN [place] 2 sprays into both  nostrils 2 times daily. 30 mL 1     artificial saliva (BIOTENE MT) SOLN solution Take 1 spray by mouth at bedtime.       blood glucose (ACCU-CHEK NORMAN PLUS) test strip USE TO TEST BLOOD SUGAR 3 TIMES A DAY OR AS DIRECTED 100 strip 2     blood glucose calibration (ACCU-CHEK NORMAN) solution USE AS DIRECTED 1 each 0     carboxymethylcellulose PF (REFRESH PLUS) 0.5 % ophthalmic solution Place 1 drop into both eyes 2 times daily.       cholecalciferol (VITAMIN D3) 1250 mcg (89902 units) capsule Take 1 capsule (1,250 mcg) by mouth every 7 days. 4 capsule 0     Continuous Glucose  (FREESTYLE DELFINA 2 READER) CHRYSTAL 1 Device continuously. 1 each 1     Continuous Glucose Sensor (FREESTYLE DELFINA 2 SENSOR) [facility] APPLY 1 SENSOR AND CHANGE EVERY 14 DAYS AS DIRECTED 6 each 3     cyanocobalamin (VITAMIN B-12) 1000 MCG tablet Take 1 tablet (1,000 mcg) by mouth daily.       dexAMETHasone (DECADRON) 1 MG tablet Take 4 tablets (4 mg) by mouth 2 times daily for 4 days, THEN 4 tablets (4 mg) daily for 7 days, THEN 2 tablets (2 mg) daily for 4 days, THEN 1 tablet (1 mg) daily for 3 days.       fenofibrate (LOFIBRA) 54 MG tablet Take 1 tablet (54 mg) by mouth daily (with breakfast). 30 tablet 0     insulin aspart (NOVOLOG PEN) 100 UNIT/ML pen Inject 1-3 Units subcutaneously 3 times daily (before meals). Correction Scale - LOW INSULIN RESISTANCE DOSING   Do Not give Correction Insulin if Pre-Meal BG less than 140. For Pre-Meal  - 239 give 1 unit. For Pre-Meal  - 339 give 2 units. For Pre-Meal BG greater than or equal to 340 give 3 units.  If patient is NOT eating a meal: Blood glucose should still be checked and correction (sliding scale) insulin to be administered within 30 minutes if order parameters are met. 3 mL 0     insulin glargine (LANTUS PEN) 100 UNIT/ML pen Inject 15 Units subcutaneously 2 times daily.       insulin pen needle (GLOBAL EASE INJECT PEN NEEDLES) 31G X 5 MM miscellaneous Use one pen needle 5 times a  "day 500 each 3     levETIRAcetam (KEPPRA) 1000 MG tablet Take 1 tablet (1,000 mg) by mouth 2 times daily.       levothyroxine (SYNTHROID/LEVOTHROID) 125 MCG tablet Take 1 tablet (125 mcg) by mouth daily 90 tablet 3     lisinopril (ZESTRIL) 5 MG tablet Take 1 tablet (5 mg) by mouth daily.       polyethylene glycol (MIRALAX) 17 GM/Dose powder Take 17 g by mouth daily.       polyethylene glycol 400 (BLINK TEARS) 0.25 % SOLN ophthalmic solution Place 1 drop into both eyes daily.       senna-docusate (SENOKOT-S/PERICOLACE) 8.6-50 MG tablet Take 1 tablet by mouth 2 times daily as needed for constipation.         Perceived AED Side Effects: No    Past AEDs:      6/23/2025     9:11 AM   AED - ANTIEPILEPTIC DRUGS   levETIRAcetam 1,000 mg BID PO          Medication marked as long-term         Exam:    Ht 5' 11\" (180.3 cm)   Wt 162 lb (73.5 kg)   BMI 22.59 kg/m       Wt Readings from Last 5 Encounters:   06/23/25 162 lb (73.5 kg)   06/17/25 162 lb (73.5 kg)   05/19/25 172 lb 2.9 oz (78.1 kg)   05/07/25 176 lb 5.9 oz (80 kg)   04/26/25 183 lb 13.8 oz (83.4 kg)       Assessment and Plan:   Pascual Perea presented with metastatic brain tumor and seizures. He was started on LEV and has not had recurrence. This medication should be continued indefinitely and appears to be well tolerated. I note the chronic thrombopenia.    No further workup or follow is needed.    I spent 30 minutes on the date of the encounter doing chart review, history and exam, documentation and further activities as noted above.       Again, thank you for allowing me to participate in the care of your patient.      Sincerely,    Jose Chavez MD    "

## 2025-06-23 NOTE — TELEPHONE ENCOUNTER
Attempted to reach patient to remind them about appointment scheduled with Davie Erickson MD on 6/24/25 in our [place] clinic.    A voicemail was unable to be left with a call back number if the patient has questions or would like to reschedule.

## 2025-06-24 ENCOUNTER — OFFICE VISIT (OUTPATIENT)
Dept: NEUROSURGERY | Facility: CLINIC | Age: 85
End: 2025-06-24
Payer: COMMERCIAL

## 2025-06-24 ENCOUNTER — ANCILLARY PROCEDURE (OUTPATIENT)
Dept: CT IMAGING | Facility: CLINIC | Age: 85
End: 2025-06-24
Attending: NURSE PRACTITIONER
Payer: COMMERCIAL

## 2025-06-24 VITALS — HEART RATE: 81 BPM | SYSTOLIC BLOOD PRESSURE: 113 MMHG | DIASTOLIC BLOOD PRESSURE: 64 MMHG | OXYGEN SATURATION: 99 %

## 2025-06-24 DIAGNOSIS — Z98.890 S/P CRANIOTOMY: ICD-10-CM

## 2025-06-24 DIAGNOSIS — C7A.1 HIGH GRADE NEUROENDOCRINE CARCINOMA (H): Primary | ICD-10-CM

## 2025-06-24 PROCEDURE — 3074F SYST BP LT 130 MM HG: CPT | Performed by: NEUROLOGICAL SURGERY

## 2025-06-24 PROCEDURE — 1125F AMNT PAIN NOTED PAIN PRSNT: CPT | Performed by: NEUROLOGICAL SURGERY

## 2025-06-24 PROCEDURE — 99024 POSTOP FOLLOW-UP VISIT: CPT | Performed by: NEUROLOGICAL SURGERY

## 2025-06-24 PROCEDURE — 70450 CT HEAD/BRAIN W/O DYE: CPT

## 2025-06-24 PROCEDURE — 3078F DIAST BP <80 MM HG: CPT | Performed by: NEUROLOGICAL SURGERY

## 2025-06-24 ASSESSMENT — PAIN SCALES - GENERAL: PAINLEVEL_OUTOF10: MODERATE PAIN (5)

## 2025-06-24 NOTE — PATIENT INSTRUCTIONS
Patient Next Steps:    Stop at the  to reschedule the 7/22 appointment with Dr. Erickson to a date AFTER MRI is completed on 7/28. Virtual appointment is ok.    Please call us if you have any further questions or concerns.    [facility]   Phone: [phone]  Fax: [phone]

## 2025-06-24 NOTE — LETTER
6/24/2025      Pascual Perea  [address]  [place] [address]      Dear Colleague,    Thank you for referring your patient, Pascual Perea, to the [facility] NEUROLOGY CLINICS [facility]. Please see a copy of my visit note below.    NEUROSURGERY FOLLOWUP  NOTE    Pascual Perea comes today in f/u 6 weeks 4/22/2025 right frontotemporal craniotomy with resection of the tumor.  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  Patient received radiation to right temporal brain between 5/19/2025 to 5/23/2025 30 Gy in 5 fractions.    Patient is doing well in the rehab facility.  He is able to ambulate with a walker up to 250 feet.  Patient reports that he is able to do his activities of daily living with minimal help.  He denies any new onset seizures.  He reports intermittent headaches not associated with nausea, vomiting, blurring of vision or double vision.  He denies any episodes of fall.    PHYSICAL EXAM:   Constitutional: /64 (BP Location: Left arm, Patient Position: Sitting, Cuff Size: Adult Regular)   Pulse 81   SpO2 99%      Mental Status: A & O in no acute distress.  Affect is appropriate.  Speech is fluent.  Recent and remote memory are intact.  Attention span and concentration are normal.     Motor: No pronator drift of upper extremity.   Normal bulk and tone all muscle groups of upper and lower extremities.    4+ by 5 throughout with no focal weakness     Sensory: Sensation intact bilaterally to light touch.      Coordination: Patient in wheelchair, gait cannot be tested.    Reflexes; supinator, biceps, triceps, knee/ ankle jerk intact.  No hoffmans/clonus.    Incision: Healed well    IMAGING:   I personally reviewed all radiographic images and agree with the radiology report of resolving hemorrhage within the right temporal tumor with significant resolution of mass effect and midline shift.     6/24/2025 CT head:  IMPRESSION:  1.  Since previous head CT 5/19/2025,  significant diminished mass effect throughout the right supratentorial level. Previously identified hemorrhagic/mixed-attenuation hyperdense mass centered at the right middle cranial fossa is decreased in size as   measured above. Decreased surrounding low-attenuation changes throughout the parenchyma of the right cerebral hemisphere representing resolving vasogenic edema and/or improvement in posttreatment changes noted. Markedly diminished mass effect with near   resolution of previously noted right to left shift of midline and ventricular effacement. Resolved intraventricular blood products with no new hemorrhage noted.     2.  No evidence intracranially to suggest recent infarction. Diminished extra-axial subdural blood products and gas-attenuation overlying the right cerebral hemisphere deep to the right frontal shelia holes, craniotomy, craniectomy and mesh cranioplasty.     CONSULTATION ASSESSMENT AND PLAN:    Mr. 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 with malaise, imbalance on walking, confusion and difficulty in finding words of few weeks duration.  He was diagnosed to have large right medial temporal tumor.      Patient underwent craniotomy with resection of the tumor on 4/22/2025.  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  Patient had significant cardiac event following the surgery in the PACU requiring CPR and ROSC was achieved.  Patient recovered well following the procedure in the surgery.  He also developed hemorrhage within the residual tumor which was managed conservatively.  Patient received radiation to right temporal brain between 5/19/2025 to 5/23/2025 30 Gy in 5 fractions.He is in the clinic today for approximately 6 weeks follow-up with  CT head.    Patient is doing well in the rehab facility and able to ambulate with a walker.  Patient is able to carry out his activities of daily living with minimal support.  He reports intermittent headaches and left-sided clumsiness.  He has no focal neurological deficit on examination.    His KPS is 90.    I explained the CT head findings to the patient and his son and showed them the images.  I explained them that there is significant resolution of hemorrhage within the residual right temporal tumor with resolution of mass effect and midline shift.    Patient is scheduled to have MRI brain with and without contrast on 7/28/2025.  I will follow him virtually following the MRI brain with and without contrast.Patient is on Keppra 1000 mg twice daily.    Patient agreed with the plan.  All the questions were answered and patient sounded understanding.  He can contact us if there are any further questions or concerns or worsening neurological deficits.I spent more than 20 minutes in this apt, examining the pt, reviewing the scans, reviewing notes from chart, discussing treatment options with risks and benefits and coordinating care. This note was created in part by the use of Dragon voice recognition system. Inadvertent grammatical errors and typographical errors may have occurred due to inherent limitation of voice recognition software.  Reasonable attempts made to avoid errors, but this document may contain an error not identified before finalizing.  Please contact me for any clarification needed.        Davie Erickson MD      CC:     Andrew Elizalde  [address] [place] [id number]      Again, thank you for allowing me to participate in the care of your patient.        Sincerely,        Davie Erickson MD    Electronically signed

## 2025-06-24 NOTE — NURSING NOTE
"Pascual Perea is a 84 year old male who presents for:  Chief Complaint   Patient presents with    RECHECK        Initial Vitals:  /64 (BP Location: Left arm, Patient Position: Sitting, Cuff Size: Adult Regular)   Pulse 81   SpO2 99%  Estimated body mass index is 22.59 kg/m  as calculated from the following:    Height as of 6/23/25: 5' 11\" (1.803 m).    Weight as of 6/23/25: 162 lb (73.5 kg).  BP completed using cuff size: regular  Moderate Pain (5)    Sheng Fabian   "

## 2025-06-24 NOTE — PROGRESS NOTES
NEUROSURGERY FOLLOWUP  NOTE    Pascual Perea comes today in f/u 6 weeks 4/22/2025 right frontotemporal craniotomy with resection of the tumor.  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  Patient received radiation to right temporal brain between 5/19/2025 to 5/23/2025 30 Gy in 5 fractions.    Patient is doing well in the rehab facility.  He is able to ambulate with a walker up to 250 feet.  Patient reports that he is able to do his activities of daily living with minimal help.  He denies any new onset seizures.  He reports intermittent headaches not associated with nausea, vomiting, blurring of vision or double vision.  He denies any episodes of fall.    PHYSICAL EXAM:   Constitutional: /64 (BP Location: Left arm, Patient Position: Sitting, Cuff Size: Adult Regular)   Pulse 81   SpO2 99%      Mental Status: A & O in no acute distress.  Affect is appropriate.  Speech is fluent.  Recent and remote memory are intact.  Attention span and concentration are normal.     Motor: No pronator drift of upper extremity.   Normal bulk and tone all muscle groups of upper and lower extremities.    4+ by 5 throughout with no focal weakness     Sensory: Sensation intact bilaterally to light touch.      Coordination: Patient in wheelchair, gait cannot be tested.    Reflexes; supinator, biceps, triceps, knee/ ankle jerk intact.  No hoffmans/clonus.    Incision: Healed well    IMAGING:   I personally reviewed all radiographic images and agree with the radiology report of resolving hemorrhage within the right temporal tumor with significant resolution of mass effect and midline shift.     6/24/2025 CT head:  IMPRESSION:  1.  Since previous head CT 5/19/2025, significant diminished mass effect throughout the right supratentorial level. Previously identified hemorrhagic/mixed-attenuation hyperdense mass centered at the right middle cranial fossa is decreased in size as   measured above. Decreased surrounding  low-attenuation changes throughout the parenchyma of the right cerebral hemisphere representing resolving vasogenic edema and/or improvement in posttreatment changes noted. Markedly diminished mass effect with near   resolution of previously noted right to left shift of midline and ventricular effacement. Resolved intraventricular blood products with no new hemorrhage noted.     2.  No evidence intracranially to suggest recent infarction. Diminished extra-axial subdural blood products and gas-attenuation overlying the right cerebral hemisphere deep to the right frontal shelia holes, craniotomy, craniectomy and mesh cranioplasty.     CONSULTATION ASSESSMENT AND PLAN:    Mr. 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 with malaise, imbalance on walking, confusion and difficulty in finding words of few weeks duration.  He was diagnosed to have large right medial temporal tumor.      Patient underwent craniotomy with resection of the tumor on 4/22/2025.  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  Patient had significant cardiac event following the surgery in the PACU requiring CPR and ROSC was achieved.  Patient recovered well following the procedure in the surgery.  He also developed hemorrhage within the residual tumor which was managed conservatively.  Patient received radiation to right temporal brain between 5/19/2025 to 5/23/2025 30 Gy in 5 fractions.He is in the clinic today for approximately 6 weeks follow-up with CT head.    Patient is doing well in the rehab facility and able to ambulate with a walker.  Patient is able to carry out his activities of daily living with minimal support.  He reports intermittent headaches and left-sided clumsiness.  He has no focal  neurological deficit on examination.    His KPS is 90.    I explained the CT head findings to the patient and his son and showed them the images.  I explained them that there is significant resolution of hemorrhage within the residual right temporal tumor with resolution of mass effect and midline shift.    Patient is scheduled to have MRI brain with and without contrast on 7/28/2025.  I will follow him virtually following the MRI brain with and without contrast.Patient is on Keppra 1000 mg twice daily.    Patient agreed with the plan.  All the questions were answered and patient sounded understanding.  He can contact us if there are any further questions or concerns or worsening neurological deficits.I spent more than 20 minutes in this apt, examining the pt, reviewing the scans, reviewing notes from chart, discussing treatment options with risks and benefits and coordinating care. This note was created in part by the use of Dragon voice recognition system. Inadvertent grammatical errors and typographical errors may have occurred due to inherent limitation of voice recognition software.  Reasonable attempts made to avoid errors, but this document may contain an error not identified before finalizing.  Please contact me for any clarification needed.        Davie Erickson MD      CC:     Andrew Elizalde  [address] [place] [id number]

## 2025-06-26 ENCOUNTER — PATIENT OUTREACH (OUTPATIENT)
Dept: CARE COORDINATION | Facility: CLINIC | Age: 85
End: 2025-06-26
Payer: COMMERCIAL

## 2025-06-26 NOTE — PROGRESS NOTES
Clinic Care Coordination Contact    Situation: Patient chart reviewed by care coordinator.    Background:   Patient was hospitalized at [facility] from 5/12/25 to 5/25/25 with diagnosis of R temporal metastatic brain tumor with hemorrhage s/p R frontoparietal craniotomy with resection 4/22/2025  Increased hemorrhagic mass and intraventricular hemorrhage   5mm L midline shift with partial R lateral ventricle effacement  Encephalopathy due to brain mass/brain edema  High grade neuroendocrine tumor, large cell type, left parotid gland s/p chemoradiation   Atrial fibrillation with slow ventricular response    Patient transitioned to [facility].     Assessment:   [phone]     Plan/Recommendations:   Care Coordination will follow up in 1 month or upon discharge notification.     Ananya Baer RN Clinic Care Coordinator  [facility] Clinics: [place], [place] (on-site Wednesdays), [facility] (on-site Thursdays) & [facility].  [email or web address]  Phone: [phone]        Move pt closer to corner so pt can use wall outlet to charge her cell phone.

## 2025-06-30 ENCOUNTER — MEDICAL CORRESPONDENCE (OUTPATIENT)
Dept: HEALTH INFORMATION MANAGEMENT | Facility: CLINIC | Age: 85
End: 2025-06-30

## 2025-07-02 ENCOUNTER — TELEPHONE (OUTPATIENT)
Dept: INTERNAL MEDICINE | Facility: CLINIC | Age: 85
End: 2025-07-02
Payer: COMMERCIAL

## 2025-07-02 ENCOUNTER — PATIENT OUTREACH (OUTPATIENT)
Dept: CARE COORDINATION | Facility: CLINIC | Age: 85
End: 2025-07-02
Payer: COMMERCIAL

## 2025-07-02 NOTE — TELEPHONE ENCOUNTER
Home Care is calling regarding an established patient with [facility].  Requesting orders from: Andrew Elizalde RN APPROVED: RN able to provide verbal orders.  Home Care will send orders for signature.  RN will close encounter.  Is this a request for a temporary pause in the home care episode?  No    Orders Requested    Occupational Therapy  Request for initial certification (first set of orders)   Frequency: 1 visit a week for 5 weeks  RN gave verbal order: Yes      Contacts       Contact Date/Time Type Contact Phone/Fax    07/02/2025 03:24 PM CDT Phone (Incoming) [facility] [phone]     confidential          Susanne Vargas RN     Split-Thickness Skin Graft Text: The defect edges were debeveled with a #15 scalpel blade.  Given the location of the defect, shape of the defect and the proximity to free margins a split thickness skin graft was deemed most appropriate.  Using a sterile surgical marker, the primary defect shape was transferred to the donor site. The split thickness graft was then harvested.  The skin graft was then placed in the primary defect and oriented appropriately.

## 2025-07-14 ENCOUNTER — HOSPITAL ENCOUNTER (OUTPATIENT)
Dept: MRI IMAGING | Facility: CLINIC | Age: 85
Discharge: HOME | End: 2025-07-14
Attending: INTERNAL MEDICINE
Payer: COMMERCIAL

## 2025-07-14 ENCOUNTER — HOSPITAL ENCOUNTER (OUTPATIENT)
Dept: PET IMAGING | Facility: CLINIC | Age: 85
Discharge: HOME | End: 2025-07-14
Attending: INTERNAL MEDICINE
Payer: COMMERCIAL

## 2025-07-14 DIAGNOSIS — C7A.1 HIGH GRADE NEUROENDOCRINE CARCINOMA (H): ICD-10-CM

## 2025-07-14 DIAGNOSIS — C79.31 MALIGNANT NEOPLASM METASTATIC TO BRAIN (H): ICD-10-CM

## 2025-07-14 LAB
CREAT BLD-MCNC: 0.9 MG/DL (ref 0.7–1.2)
EGFRCR SERPLBLD CKD-EPI 2021: >60 ML/MIN/1.73M2

## 2025-07-14 PROCEDURE — 71260 CT THORAX DX C+: CPT

## 2025-07-14 PROCEDURE — 343N000001 HC RX 343 MED OP 636: Performed by: INTERNAL MEDICINE

## 2025-07-14 PROCEDURE — 70553 MRI BRAIN STEM W/O & W/DYE: CPT

## 2025-07-14 PROCEDURE — 250N000011 HC RX IP 250 OP 636: Performed by: INTERNAL MEDICINE

## 2025-07-14 PROCEDURE — 70491 CT SOFT TISSUE NECK W/DYE: CPT

## 2025-07-14 PROCEDURE — A9585 GADOBUTROL INJECTION: HCPCS | Performed by: INTERNAL MEDICINE

## 2025-07-14 PROCEDURE — 82565 ASSAY OF CREATININE: CPT

## 2025-07-14 PROCEDURE — 255N000002 HC RX 255 OP 636: Performed by: INTERNAL MEDICINE

## 2025-07-14 PROCEDURE — 78816 PET IMAGE W/CT FULL BODY: CPT | Mod: PS

## 2025-07-14 PROCEDURE — A9552 F18 FDG: HCPCS | Performed by: INTERNAL MEDICINE

## 2025-07-14 RX ORDER — GADOBUTROL 604.72 MG/ML
7 INJECTION INTRAVENOUS ONCE
Status: COMPLETED | OUTPATIENT
Start: 2025-07-14 | End: 2025-07-14

## 2025-07-14 RX ORDER — IOPAMIDOL 755 MG/ML
10-135 INJECTION, SOLUTION INTRAVASCULAR ONCE
Status: COMPLETED | OUTPATIENT
Start: 2025-07-14 | End: 2025-07-14

## 2025-07-14 RX ORDER — FLUDEOXYGLUCOSE F 18 200 MCI/ML
10-18 INJECTION, SOLUTION INTRAVENOUS ONCE
Status: COMPLETED | OUTPATIENT
Start: 2025-07-14 | End: 2025-07-14

## 2025-07-14 RX ADMIN — IOPAMIDOL 99 ML: 755 INJECTION, SOLUTION INTRAVENOUS at 07:22

## 2025-07-14 RX ADMIN — FLUDEOXYGLUCOSE F 18 11.82 MILLICURIE: 200 INJECTION, SOLUTION INTRAVENOUS at 07:21

## 2025-07-14 RX ADMIN — GADOBUTROL 7 ML: 604.72 INJECTION INTRAVENOUS at 12:33

## 2025-07-16 ENCOUNTER — VIRTUAL VISIT (OUTPATIENT)
Dept: ONCOLOGY | Facility: CLINIC | Age: 85
End: 2025-07-16
Attending: INTERNAL MEDICINE
Payer: COMMERCIAL

## 2025-07-16 VITALS — WEIGHT: 166 LBS | BODY MASS INDEX: 23.24 KG/M2 | HEIGHT: 71 IN

## 2025-07-16 DIAGNOSIS — C7A.1 HIGH GRADE NEUROENDOCRINE CARCINOMA (H): Primary | ICD-10-CM

## 2025-07-16 PROCEDURE — 1125F AMNT PAIN NOTED PAIN PRSNT: CPT | Performed by: INTERNAL MEDICINE

## 2025-07-16 PROCEDURE — 98007 SYNCH AUDIO-VIDEO EST HI 40: CPT | Performed by: INTERNAL MEDICINE

## 2025-07-16 ASSESSMENT — PAIN SCALES - GENERAL: PAINLEVEL_OUTOF10: MILD PAIN (2)

## 2025-07-16 NOTE — LETTER
7/16/2025      Pascual Perea  [address]  [place] [address]      Dear Colleague,    Thank you for referring your patient, Pascual Perea, to the [facility]. Please see a copy of my visit note below.    Virtual Visit Details    Type of service:  Video Visit   Video Start Time: 11:46 AM  Video End Time:11:52 AM    Originating Location (pt. Location): Home    Distant Location (provider location):  On-site  Platform used for Video Visit: [facility]    ONCOLOGY HISTORY: Mr. 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     1.  CT neck on 06/20/2024 revealed 2.8 cm mass in left parotid gland and multiple enlarged left-sided cervical lymph node.  -Ultrasound-guided biopsy of left cervical mass on 06/28/2024 revealed METASTATIC HIGH-GRADE NEUROENDOCRINE CARCINOMA, LARGE CELL TYPE, at least 1.2 cm in linear extent, extending to biopsy margins.  -PET scan on 07/19/2024 revealed hypermetabolic left parotid mass and multiple hypermetabolic left cervical lymphadenopathy.  Severely stenotic left internal jugular vein secondary to mass effect.  -He was seen by ENT at [facility].  Flexible fiberoptic laryngoscopy on 07/15/2024 was normal.     2. Case discussed at tumor board at [facility].  Tumor board recommendation was to treat it as small cell lung cancer.  Recommendation was to give him 4 cycle of platinum and etoposide and add radiation with cycle 2 or 3.     3. Carboplatin and etoposide x 4 cycles between 08/14/2024 and 10/18/2024 (carboplatin chosen because of CKD, hearing loss and his age).  -Radiation to left parotid between 09/09/2024 and 10/21/2024.  6000 cGy in 30 fractions.     4.  PET scan on 12/23/2024 revealed complete remission.     5.  Presented to ER on 04/16/25:  - CT head on 04/16/25 revealed right temporal lobe mass with associated hemorrhage.  - CT  neck, chest, abdomen and pelvis on 04/17/25 did not reveal any evidence of malignancy.     6.  On 04/22/2025, patient had craniotomy and resection of right temporal brain tumor.  Pathology revealed Metastatic small cell carcinoma.  -Patient received radiation to right temporal brain between 05/19/2025 and 05/23/2025.  3000 cGy in 5 fractions.    7.  PET scan on 07/14/2025 reveals new metastatic site including left hepatic lobe, distal transverse colon and spinal canal at C7/T1 level.  - Brain MRI on 07/14/2025 reveals progression of malignancy.     Subjective:   Daughter (Callie Perea) was on the video.  Patient is currently in assisted living.     Mr. 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 It was treated as limited stage  small cell lung cancer with concurrent chemoradiation. He completed 4 cycle of carboplatin and etoposide on 10/18/2024.  He completed radiation on 10/21/2024. PET scan on 12/23/2024 revealed complete metabolic response.      Patient was found to have recurrence in brain in April 2025.  He had resection of right temporal brain tumor.  Pathology revealed metastatic small cell carcinoma.  He received post-op radiation in may,2025.    PET scan on 07/14/2025 reveals progression.    Brain MRI on 07/14/2025 reveals progression.    Patient's overall condition is not good.  Patient continues to be very weak.  Most of the time he just sleeping.  He is not in any severe pain.  No chest pain.  No shortness of breath.  No recurrent vomiting.  Appetite is fair.     Exam:  Patient in wheelchair.  He was very weak.  He was dozing on and off during video visit.  Not in any respiratory distress.  ECOG PS of 3.  Rest of the system is not examined.     Assessment:  1.  An 85-year-old gentleman with high-grade neuroendocrine carcinoma, large cell type, involving left parotid gland and left cervical  lymph nodes diagnosed in 06/2024.  Disease is progressing.  2.  Worsening weakness.     Plan:  - Hospice.  Daughter mentioned that she is going to discuss this with her other family members.      Discussion:  1.  Brain MRI and PET scan was reviewed with the daughter.  Explained to her it reveals progression of disease both in the brain and outside brain.    2.  Discussed regarding further treatment.  Patient is not a candidate for any further treatment for cancer.  Patient is very weak.  He needs help with activities of daily living.  Any treatment will cause more harm than benefit.  I am not recommending any treatment for cancer.    Patient's life expectancy is limited to less than 6 months because of intracranial progression.  I discussed regarding comfort care with hospice.  Daughter had few questions which were all answered.  Daughter wants to discuss with other family members regarding hospice.  They will let us know of their decision.    Total video visit time of 30 minutes.  Time spent in today's visit, review of chart/investigations today and documentation.    Again, thank you for allowing me to participate in the care of your patient.        Sincerely,        Florencia Butler MD    Electronically signed

## 2025-07-16 NOTE — PROGRESS NOTES
Virtual Visit Details    Type of service:  Video Visit   Video Start Time: 11:46 AM  Video End Time:11:52 AM    Originating Location (pt. Location): Home  {PROVIDER LOCATION On-site should be selected for visits conducted from your clinic location or adjoining [facility] hospital, academic office, or other nearby [facility] building. Off-site should be selected for all other provider locations, including home:[id number]}  Distant Location (provider location):  On-site  Platform used for Video Visit: [facility]    ONCOLOGY HISTORY: Mr. 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     1.  CT neck on 06/20/2024 revealed 2.8 cm mass in left parotid gland and multiple enlarged left-sided cervical lymph node.  -Ultrasound-guided biopsy of left cervical mass on 06/28/2024 revealed METASTATIC HIGH-GRADE NEUROENDOCRINE CARCINOMA, LARGE CELL TYPE, at least 1.2 cm in linear extent, extending to biopsy margins.  -PET scan on 07/19/2024 revealed hypermetabolic left parotid mass and multiple hypermetabolic left cervical lymphadenopathy.  Severely stenotic left internal jugular vein secondary to mass effect.  -He was seen by ENT at [facility].  Flexible fiberoptic laryngoscopy on 07/15/2024 was normal.     2. Case discussed at tumor board at [facility].  Tumor board recommendation was to treat it as small cell lung cancer.  Recommendation was to give him 4 cycle of platinum and etoposide and add radiation with cycle 2 or 3.     3. Carboplatin and etoposide x 4 cycles between 08/14/2024 and 10/18/2024 (carboplatin chosen because of CKD, hearing loss and his age).  -Radiation to left parotid between 09/09/2024 and 10/21/2024.  6000 cGy in 30 fractions.     4.  PET scan on 12/23/2024 revealed complete remission.     5.  Presented to ER on 04/16/25:  - CT head on 04/16/25 revealed right temporal lobe mass with associated  hemorrhage.  - CT neck, chest, abdomen and pelvis on 04/17/25 did not reveal any evidence of malignancy.     6.  On 04/22/2025, patient had craniotomy and resection of right temporal brain tumor.  Pathology revealed Metastatic small cell carcinoma.  -Patient received radiation to right temporal brain between 05/19/2025 and 05/23/2025.  3000 cGy in 5 fractions.     Subjective:   Son came with the patient.  Patient is currently in assisted living.     Mr. 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 It was treated as limited stage  small cell lung cancer with concurrent chemoradiation. He completed 4 cycle of carboplatin and etoposide on 10/18/2024.  He completed radiation on 10/21/2024. PET scan on 12/23/2024 revealed complete metabolic response.      Patient was found to have recurrence in brain in April 2025.  He had resection of right temporal brain tumor.  Pathology revealed metastatic small cell carcinoma.  He received radiation last month.     Patient is currently in rehab.  Patient is weak.  He walks with the help of a walker.  He needs help with activities of daily living.     No headache.  He gets some dizziness when he stands.  No chest pain.  No shortness of breath.  No nausea.  No vomiting.  No bleeding.  He has mild numbness and tingling in the extremities which are stable.     As per the son, patient's overall condition is slowly improving.  He is getting little stronger.     Exam:  He is alert and oriented.  He looked very weak.  Vitals: Reviewed.  Rest of the system is not examined.     Labs: CBC and CMP reviewed.     Assessment:  1.  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  He achieved complete remission.  Now he had recurrence in the brain.   He is status post craniotomy and resection of brain mass and radiation.  2.  Generalized weakness.  3.  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  4.  Grade 1 peripheral neuropathy, stable.     Plan:  -PET scan end of July 2025.  -Brain MRI end of July 2025.  -Labs today.  -Cancel CT scan scheduled for July 7,2025.  -See me after PET scan.     Discussion:  1.  Discussed with the patient and son regarding recurrent malignancy.  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  He received concurrent chemoradiation and achieved complete remission.  Now he had recurrence in the brain and had craniotomy done.  Pathology revealed small cell carcinoma.  He has received postop radiation.     He had CT neck, chest, abdomen and pelvis in April.  No evidence of malignancy.  Recurrence of malignancy localized to brain.     2.  Discussed regarding further treatment.  No need for any chemotherapy as there is no evidence of disease outside the brain.     3.  Discussed regarding follow-up with scans.  Will get PET scan in July. Will also get brain MRI end of July.     I asked the patient if he will take systemic treatment if he has recurrence of disease.  Patient unable to make a decision. Son is going to discuss with patient and other family members and let us know of the decision.  If patient decides that he will not take any further treatment if cancer comes back, then we do not need to continue doing follow-up with scans.     4.  Labs done today reviewed.  CBC reveals pancytopenia.  This is from previous chemoradiation and also from recent radiation.  He is not neutropenic.  Pancytopenia should not cause any complication.     5.  Son had few questions which were all answered.  I will see the patient after the PET scan.     Total visit time of 40 minutes.  Time spent in today's visit, review of chart/investigations today and documentation  today.

## 2025-07-16 NOTE — NURSING NOTE
Current patient location: [address]  [place] [place] [address]    Is the patient currently in the state of [place]? YES    Visit mode: VIDEO    If the visit is dropped, the patient can be reconnected by:VIDEO VISIT: Text to cell phone:   Telephone Information:   Mobile [phone]       Will anyone else be joining the visit? NO  (If patient encounters technical issues they should call [phone])    Are changes needed to the allergy or medication list? Pt stated no changes to allergies and Pt stated no med changes    Are refills needed on medications prescribed by this physician? NO    Rooming Documentation:  Unable to complete questionnaire(s) due to time    Reason for visit: TABITHA TUCKERF

## 2025-07-16 NOTE — LETTER
7/16/2025      Pascual Perea  [address]  [facility] [address]      Dear Colleague,    Thank you for referring your patient, Pascual Perea, to the [facility]. Please see a copy of my visit note below.    Virtual Visit Details    Type of service:  Video Visit   Video Start Time: 11:46 AM  Video End Time:11:52 AM    Originating Location (pt. Location): Home    Distant Location (provider location):  On-site  Platform used for Video Visit: [facility]    ONCOLOGY HISTORY: Mr. 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     1.  CT neck on 06/20/2024 revealed 2.8 cm mass in left parotid gland and multiple enlarged left-sided cervical lymph node.  -Ultrasound-guided biopsy of left cervical mass on 06/28/2024 revealed METASTATIC HIGH-GRADE NEUROENDOCRINE CARCINOMA, LARGE CELL TYPE, at least 1.2 cm in linear extent, extending to biopsy margins.  -PET scan on 07/19/2024 revealed hypermetabolic left parotid mass and multiple hypermetabolic left cervical lymphadenopathy.  Severely stenotic left internal jugular vein secondary to mass effect.  -He was seen by ENT at [facility].  Flexible fiberoptic laryngoscopy on 07/15/2024 was normal.     2. Case discussed at tumor board at [facility].  Tumor board recommendation was to treat it as small cell lung cancer.  Recommendation was to give him 4 cycle of platinum and etoposide and add radiation with cycle 2 or 3.     3. Carboplatin and etoposide x 4 cycles between 08/14/2024 and 10/18/2024 (carboplatin chosen because of CKD, hearing loss and his age).  -Radiation to left parotid between 09/09/2024 and 10/21/2024.  6000 cGy in 30 fractions.     4.  PET scan on 12/23/2024 revealed complete remission.     5.  Presented to ER on 04/16/25:  - CT head on 04/16/25 revealed right temporal lobe mass with associated hemorrhage.  - CT  neck, chest, abdomen and pelvis on 04/17/25 did not reveal any evidence of malignancy.     6.  On 04/22/2025, patient had craniotomy and resection of right temporal brain tumor.  Pathology revealed Metastatic small cell carcinoma.  -Patient received radiation to right temporal brain between 05/19/2025 and 05/23/2025.  3000 cGy in 5 fractions.    7.  PET scan on 07/14/2025 reveals new metastatic site including left hepatic lobe, distal transverse colon and spinal canal at C7/T1 level.  - Brain MRI on 07/14/2025 reveals progression of malignancy.     Subjective:   Daughter (Callie Perea) was on the video.  Patient is currently in assisted living.     Mr. 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 It was treated as limited stage  small cell lung cancer with concurrent chemoradiation. He completed 4 cycle of carboplatin and etoposide on 10/18/2024.  He completed radiation on 10/21/2024. PET scan on 12/23/2024 revealed complete metabolic response.      Patient was found to have recurrence in brain in April 2025.  He had resection of right temporal brain tumor.  Pathology revealed metastatic small cell carcinoma.  He received post-op radiation in may,2025.    PET scan on 07/14/2025 reveals progression.    Brain MRI on 07/14/2025 reveals progression.    Patient's overall condition is not good.  Patient continues to be very weak.  Most of the time he just sleeping.  He is not in any severe pain.  No chest pain.  No shortness of breath.  No recurrent vomiting.  Appetite is fair.     Exam:  Patient in wheelchair.  He was very weak.  He was dozing on and off during video visit.  Not in any respiratory distress.  ECOG PS of 3.  Rest of the system is not examined.     Assessment:  1.  An 85-year-old gentleman with high-grade neuroendocrine carcinoma, large cell type, involving left parotid gland and left cervical  lymph nodes diagnosed in 06/2024.  Disease is progressing.  2.  Worsening weakness.     Plan:  - Hospice.  Daughter mentioned that she is going to discuss this with her other family members.      Discussion:  1.  Brain MRI and PET scan was reviewed with the daughter.  Explained to her it reveals progression of disease both in the brain and outside brain.    2.  Discussed regarding further treatment.  Patient is not a candidate for any further treatment for cancer.  Patient is very weak.  He needs help with activities of daily living.  Any treatment will cause more harm than benefit.  I am not recommending any treatment for cancer.    Patient's life expectancy is limited to less than 6 months because of intracranial progression.  I discussed regarding comfort care with hospice.  Daughter had few questions which were all answered.  Daughter wants to discuss with other family members regarding hospice.  They will let us know of their decision.    Total video visit time of 30 minutes.  Time spent in today's visit, review of chart/investigations today and documentation.    Again, thank you for allowing me to participate in the care of your patient.        Sincerely,        Florencia Butler MD    Electronically signed

## 2025-08-06 ENCOUNTER — PATIENT OUTREACH (OUTPATIENT)
Dept: CARE COORDINATION | Facility: CLINIC | Age: 85
End: 2025-08-06
Payer: COMMERCIAL

## (undated) DEVICE — CATH TRAY FOLEY COUDE SURESTEP 16FR W/DRN BAG LATEX A304416A

## (undated) DEVICE — BATTERY NEURO DRIVER AXS PDCMF-BP-001

## (undated) DEVICE — PACK LAP CHOLE SLC15LCFSD

## (undated) DEVICE — PREP CHLORAPREP 26ML TINTED ORANGE  260815

## (undated) DEVICE — CLIP APPLIER ENDO 5MM M/L LIGAMAX EL5ML

## (undated) DEVICE — LINEN TOWEL PACK X5 5464

## (undated) DEVICE — SU MONOCRYL 4-0 PS-2 18" UND Y496G

## (undated) DEVICE — TUBING SET IRR MALIS BIPOLAR CORD INTEGRATE 6790-100-003

## (undated) DEVICE — TOOL DISSECT MIDAS MR8 9CM MATCH HEAD 3MM DI MR8-9MH30

## (undated) DEVICE — GLOVE BIOGEL PI MICRO SZ 7.0 48570

## (undated) DEVICE — CASSETTE SONOPET IRRIG SUCTION STRL 5500-573-000

## (undated) DEVICE — ESU GROUND PAD ADULT W/CORD E7507

## (undated) DEVICE — RX SURGIFLO HEMOSTATIC MATRIX W/THROMBIN 8ML 2994

## (undated) DEVICE — SURGICEL HEMOSTAT 2X14" 1951

## (undated) DEVICE — BLADE CLIPPER SGL USE 9680

## (undated) DEVICE — SOL WATER IRRIG 1000ML BOTTLE 2F7114

## (undated) DEVICE — TIP ASPIRATOR SONOPET IQ LARGE 5500-25S-308

## (undated) DEVICE — SU NUROLON 4-0 TF CR 8X18" C584D

## (undated) DEVICE — PIN SKULL MAYFIELD ADULT TITANIUM 3/PK A1120

## (undated) DEVICE — ENDO POUCH UNIV RETRIEVAL SYSTEM INZII 10MM CD001

## (undated) DEVICE — SU VICRYL 3-0 SH 27" J316H

## (undated) DEVICE — DRSG XEROFORM 1X8"

## (undated) DEVICE — Device

## (undated) DEVICE — SUCTION CANISTER MEDIVAC LINER 3000ML W/LID 65651-530

## (undated) DEVICE — DRAIN JACKSON PRATT CHANNEL 10FR RND SIL W/TROCAR JP-2227

## (undated) DEVICE — DECANTER VIAL 2006S

## (undated) DEVICE — STPL SKIN 35W ROTATING HEAD PRW35

## (undated) DEVICE — DRAPE MICROSCOPE LEICA 54X150" AR8033650

## (undated) DEVICE — TUBING SUCTION MEDI-VAC SOFT 3/16"X20' N520A

## (undated) DEVICE — ENDO TROCAR FIRST ENTRY KII FIOS Z-THRD 12X100MM CTF73

## (undated) DEVICE — ESU GROUND PAD UNIVERSAL W/O CORD

## (undated) DEVICE — ESU HOLDER LAP INST DISP PURPLE LONG 330MM H-PRO-330

## (undated) DEVICE — SOL NACL 0.9% IRRIG 1000ML BOTTLE 2F7124

## (undated) DEVICE — SPONGE COTTONOID 1X3" 80-1408

## (undated) DEVICE — SPONGE COTTONOID 1/4X1/4" 80-1399

## (undated) DEVICE — SU VICRYL 2-0 CT-2 CR 8X18" J726D

## (undated) DEVICE — SPONGE COTTONOID 1X1" 80-1403

## (undated) DEVICE — SU MONOCRYL 3-0 PS-2 27" Y427H

## (undated) DEVICE — SOL NACL 0.9% INJ 1000ML BAG 2B1324X

## (undated) DEVICE — ENDO TROCAR FIRST ENTRY KII FIOS Z-THRD 05X100MM CTF03

## (undated) DEVICE — DRAIN JACKSON PRATT RESERVOIR 100ML SU130-1305

## (undated) DEVICE — SUCTION MANIFOLD NEPTUNE 2 SYS 4 PORT 0702-020-000

## (undated) DEVICE — GLOVE PROTEXIS BLUE W/NEU-THERA 6.5  2D73EB65

## (undated) DEVICE — SYR 20ML LL W/O NDL 302830

## (undated) DEVICE — SYSTEM CLEARIFY VISUALIZATION 21-345

## (undated) DEVICE — SUCTION IRR STRYKERFLOW II W/TIP 250-070-520

## (undated) DEVICE — TOOL DISSECT MIDAS MR8 F2/7CM TAPER 2.3MM DI MR8-F2/7TA23

## (undated) DEVICE — SOL NACL 0.9% INJ 250ML BAG 2B1322Q

## (undated) DEVICE — GLOVE PROTEXIS MICRO 6.0  2D73PM60

## (undated) DEVICE — GLOVE BIOGEL PI MICRO INDICATOR UNDERGLOVE SZ 7.5 48975

## (undated) DEVICE — PACK NEURO SNE15SNFSA

## (undated) DEVICE — DRAPE ISOLATION BAG 1003

## (undated) DEVICE — TRAY PREP DRY SKIN SCRUB 067

## (undated) DEVICE — SPONGE SURGIFOAM 100 1974

## (undated) DEVICE — SU ETHILON 3-0 FS-1 18" 669H

## (undated) DEVICE — NDL ANGIOCATH 14GA 1.25" 4048

## (undated) DEVICE — COVER ULTRASOUND PROBE FLEXI-FEEL 6X48" LF 25-FF648

## (undated) DEVICE — SPONGE COTTONOID 1/2X1/2" 80-1400

## (undated) DEVICE — DRAPE WARMER 66X44" ORS-300

## (undated) DEVICE — SPONGE COTTONOID 1/2X3" 80-1407

## (undated) DEVICE — SU VICRYL 0 UR-6 27" J603H

## (undated) DEVICE — MARKER SPHERES PASSIVE MEDT PACK 5 8801075

## (undated) DEVICE — RETR ELASTIC STAYS LONE STAR BLUNT DUAL LEAD 3550-1G

## (undated) DEVICE — ENDO TROCAR SLEEVE KII Z-THREADED 05X100MM CTS02

## (undated) DEVICE — TOOL DISSECT MIDAS MR8 9CM BALL 6MM DIA MR8-9BA60

## (undated) RX ORDER — PROPOFOL 10 MG/ML
INJECTION, EMULSION INTRAVENOUS
Status: DISPENSED
Start: 2019-03-14

## (undated) RX ORDER — NEOSTIGMINE METHYLSULFATE 1 MG/ML
VIAL (ML) INJECTION
Status: DISPENSED
Start: 2019-03-17

## (undated) RX ORDER — LIDOCAINE HYDROCHLORIDE 10 MG/ML
INJECTION, SOLUTION INFILTRATION; PERINEURAL
Status: DISPENSED
Start: 2024-08-09

## (undated) RX ORDER — DEXAMETHASONE SODIUM PHOSPHATE 4 MG/ML
INJECTION, SOLUTION INTRA-ARTICULAR; INTRALESIONAL; INTRAMUSCULAR; INTRAVENOUS; SOFT TISSUE
Status: DISPENSED
Start: 2025-04-22

## (undated) RX ORDER — DEXTROSE MONOHYDRATE 25 G/50ML
INJECTION, SOLUTION INTRAVENOUS
Status: DISPENSED
Start: 2019-03-17

## (undated) RX ORDER — GINSENG 100 MG
CAPSULE ORAL
Status: DISPENSED
Start: 2025-04-22

## (undated) RX ORDER — MAGNESIUM SULFATE HEPTAHYDRATE 40 MG/ML
INJECTION, SOLUTION INTRAVENOUS
Status: DISPENSED
Start: 2019-05-22

## (undated) RX ORDER — GLYCOPYRROLATE 0.2 MG/ML
INJECTION, SOLUTION INTRAMUSCULAR; INTRAVENOUS
Status: DISPENSED
Start: 2019-03-17

## (undated) RX ORDER — EPHEDRINE SULFATE 50 MG/ML
INJECTION, SOLUTION INTRAMUSCULAR; INTRAVENOUS; SUBCUTANEOUS
Status: DISPENSED
Start: 2025-04-22

## (undated) RX ORDER — FENTANYL CITRATE 50 UG/ML
INJECTION, SOLUTION INTRAMUSCULAR; INTRAVENOUS
Status: DISPENSED
Start: 2019-03-17

## (undated) RX ORDER — PROPOFOL 10 MG/ML
INJECTION, EMULSION INTRAVENOUS
Status: DISPENSED
Start: 2025-04-22

## (undated) RX ORDER — ONDANSETRON 2 MG/ML
INJECTION INTRAMUSCULAR; INTRAVENOUS
Status: DISPENSED
Start: 2019-03-17

## (undated) RX ORDER — PROPOFOL 10 MG/ML
INJECTION, EMULSION INTRAVENOUS
Status: DISPENSED
Start: 2019-03-17

## (undated) RX ORDER — LIDOCAINE HYDROCHLORIDE 20 MG/ML
INJECTION, SOLUTION EPIDURAL; INFILTRATION; INTRACAUDAL; PERINEURAL
Status: DISPENSED
Start: 2019-03-17

## (undated) RX ORDER — FENTANYL CITRATE 50 UG/ML
INJECTION, SOLUTION INTRAMUSCULAR; INTRAVENOUS
Status: DISPENSED
Start: 2025-04-22

## (undated) RX ORDER — HEPARIN SODIUM (PORCINE) LOCK FLUSH IV SOLN 100 UNIT/ML 100 UNIT/ML
SOLUTION INTRAVENOUS
Status: DISPENSED
Start: 2024-08-09

## (undated) RX ORDER — GLYCOPYRROLATE 0.2 MG/ML
INJECTION, SOLUTION INTRAMUSCULAR; INTRAVENOUS
Status: DISPENSED
Start: 2025-04-22

## (undated) RX ORDER — MAGNESIUM SULFATE HEPTAHYDRATE 40 MG/ML
INJECTION, SOLUTION INTRAVENOUS
Status: DISPENSED
Start: 2019-02-13

## (undated) RX ORDER — REGADENOSON 0.08 MG/ML
INJECTION, SOLUTION INTRAVENOUS
Status: DISPENSED
Start: 2022-10-14

## (undated) RX ORDER — FENTANYL CITRATE 50 UG/ML
INJECTION, SOLUTION INTRAMUSCULAR; INTRAVENOUS
Status: DISPENSED
Start: 2019-03-14

## (undated) RX ORDER — REGADENOSON 0.08 MG/ML
INJECTION, SOLUTION INTRAVENOUS
Status: DISPENSED
Start: 2018-04-18

## (undated) RX ORDER — HYDROMORPHONE HYDROCHLORIDE 1 MG/ML
INJECTION, SOLUTION INTRAMUSCULAR; INTRAVENOUS; SUBCUTANEOUS
Status: DISPENSED
Start: 2025-04-22

## (undated) RX ORDER — DEXAMETHASONE SODIUM PHOSPHATE 4 MG/ML
INJECTION, SOLUTION INTRA-ARTICULAR; INTRALESIONAL; INTRAMUSCULAR; INTRAVENOUS; SOFT TISSUE
Status: DISPENSED
Start: 2019-03-17

## (undated) RX ORDER — METOPROLOL TARTRATE 50 MG
TABLET ORAL
Status: DISPENSED
Start: 2018-04-24

## (undated) RX ORDER — ONDANSETRON 2 MG/ML
INJECTION INTRAMUSCULAR; INTRAVENOUS
Status: DISPENSED
Start: 2025-04-22

## (undated) RX ORDER — PIPERACILLIN SODIUM, TAZOBACTAM SODIUM 3; .375 G/15ML; G/15ML
INJECTION, POWDER, LYOPHILIZED, FOR SOLUTION INTRAVENOUS
Status: DISPENSED
Start: 2019-03-17

## (undated) RX ORDER — CEFAZOLIN SODIUM 2 G/100ML
INJECTION, SOLUTION INTRAVENOUS
Status: DISPENSED
Start: 2024-08-09